# Patient Record
Sex: FEMALE | Race: WHITE | NOT HISPANIC OR LATINO | Employment: FULL TIME | URBAN - NONMETROPOLITAN AREA
[De-identification: names, ages, dates, MRNs, and addresses within clinical notes are randomized per-mention and may not be internally consistent; named-entity substitution may affect disease eponyms.]

---

## 2017-01-03 ENCOUNTER — DOCUMENTATION (OUTPATIENT)
Dept: GASTROENTEROLOGY | Facility: CLINIC | Age: 42
End: 2017-01-03

## 2017-01-03 NOTE — PROGRESS NOTES
I faxed approval for 1 refill of Miralax to patients pharmacy with request for patient to schedule an office visit for more refills. This is scanned in media under medications.

## 2017-01-09 ENCOUNTER — HOSPITAL ENCOUNTER (OUTPATIENT)
Dept: INFUSION THERAPY | Facility: HOSPITAL | Age: 42
Setting detail: INFUSION SERIES
Discharge: HOME | End: 2017-01-09
Attending: PSYCHIATRY & NEUROLOGY

## 2017-01-09 ENCOUNTER — TRANSCRIBE ORDERS (OUTPATIENT)
Dept: INFUSION THERAPY | Facility: HOSPITAL | Age: 42
End: 2017-01-09

## 2017-01-09 VITALS
BODY MASS INDEX: 22.66 KG/M2 | DIASTOLIC BLOOD PRESSURE: 81 MMHG | HEART RATE: 77 BPM | WEIGHT: 153 LBS | SYSTOLIC BLOOD PRESSURE: 121 MMHG | TEMPERATURE: 97.6 F | HEIGHT: 69 IN | RESPIRATION RATE: 16 BRPM

## 2017-01-09 DIAGNOSIS — G43.709 MIGRAINE, TRANSFORMED: Primary | ICD-10-CM

## 2017-01-09 DIAGNOSIS — G43.709 MIGRAINE, TRANSFORMED: ICD-10-CM

## 2017-01-09 PROCEDURE — 25010000002 HYDROMORPHONE PER 4 MG: Performed by: PSYCHIATRY & NEUROLOGY

## 2017-01-09 PROCEDURE — 25010000002 PROMETHAZINE PER 50 MG: Performed by: PSYCHIATRY & NEUROLOGY

## 2017-01-09 PROCEDURE — 25010000002 BUTORPHANOL PER 1 MG: Performed by: PSYCHIATRY & NEUROLOGY

## 2017-01-09 PROCEDURE — 96372 THER/PROPH/DIAG INJ SC/IM: CPT

## 2017-01-09 RX ORDER — PROMETHAZINE HYDROCHLORIDE 25 MG/ML
25 INJECTION, SOLUTION INTRAMUSCULAR; INTRAVENOUS ONCE
Status: COMPLETED | OUTPATIENT
Start: 2017-01-09 | End: 2017-01-09

## 2017-01-09 RX ADMIN — HYDROMORPHONE HYDROCHLORIDE 1 MG: 1 INJECTION, SOLUTION INTRAMUSCULAR; INTRAVENOUS; SUBCUTANEOUS at 14:20

## 2017-01-09 RX ADMIN — BUTORPHANOL TARTRATE 2 MG: 2 INJECTION, SOLUTION INTRAMUSCULAR; INTRAVENOUS at 14:44

## 2017-01-09 RX ADMIN — PROMETHAZINE HYDROCHLORIDE 25 MG: 25 INJECTION INTRAMUSCULAR; INTRAVENOUS at 14:19

## 2017-01-09 NOTE — PATIENT INSTRUCTIONS
Driving and Equipment Restrictions  Some medical problems make it dangerous to drive, ride a bike, or use machines. Some of these problems are:  · A hard blow to the head (concussion).  · Passing out (fainting).  · Twitching and shaking (seizures).  · Low blood sugar.  · Taking medicine to help you relax (sedatives).  · Taking pain medicines.  · Wearing an eye patch.  · Wearing splints. This can make it hard to use parts of your body that you need to drive safely.  HOME CARE   · Do not drive until your doctor says it is okay.  · Do not use machines until your doctor says it is okay.  You may need a form signed by your doctor (medical release) before you can drive again. You may also need this form before you do other tasks where you need to be fully alert.  MAKE SURE YOU:  · Understand these instructions.  · Will watch your condition.  · Will get help right away if you are not doing well or get worse.     This information is not intended to replace advice given to you by your health care provider. Make sure you discuss any questions you have with your health care provider.     Document Released: 01/25/2006 Document Revised: 03/11/2013 Document Reviewed: 04/27/2011  [employer] ©2016 [employer]  Hydromorphone injection  What is this medicine?  HYDROMORPHONE (tomas droe MOR fone) is a pain reliever. It is used to treat moderate to severe pain.  This medicine may be used for other purposes; ask your health care provider or pharmacist if you have questions.  What should I tell my health care provider before I take this medicine?  They need to know if you have any of these conditions:  -brain tumor  -drug abuse or addiction  -head injury  -heart disease  -frequently drink alcohol containing drinks  -kidney disease  -liver disease  -lung disease, asthma, or breathing problems  -an allergic or unusual reaction to hydromorphone, other opioid analgesics, latex, other medicines, foods, dyes, or  preservatives  -pregnant or trying to get pregnant  -breast-feeding  How should I use this medicine?  This medicine is for injection into a vein, into a muscle, or under the skin. It is usually given by a health care professional in a hospital or clinic setting.  If you get this medicine at home, you will be taught how to prepare and give this medicine. Use exactly as directed. Take your medicine at regular intervals. Do not take your medicine more often than directed.  It is important that you put your used needles and syringes in a special sharps container. Do not put them in a trash can. If you do not have a sharps container, call your pharmacist or healthcare provider to get one.  Talk to your pediatrician regarding the use of this medicine in children. This medicine is not approved for use in children.  Overdosage: If you think you have taken too much of this medicine contact a poison control center or emergency room at once.  NOTE: This medicine is only for you. Do not share this medicine with others.  What if I miss a dose?  If you miss a dose, use it as soon as you can. If it is almost time for your next dose, use only that dose. Do not use double or extra doses.  What may interact with this medicine?  -alcohol  -antihistamines for allergy, cough and cold  -medicines for anesthesia  -medicines for depression, anxiety, or psychotic disturbances  -medicines for sleep  -muscle relaxants  -naltrexone  -narcotic medicines (opiates) for pain  -phenothiazines like chlorpromazine, mesoridazine, prochlorperazine, thioridazine  -tramadol  This list may not describe all possible interactions. Give your health care provider a list of all the medicines, herbs, non-prescription drugs, or dietary supplements you use. Also tell them if you smoke, drink alcohol, or use illegal drugs. Some items may interact with your medicine.  What should I watch for while using this medicine?  Tell your doctor or health care professional  if your pain does not go away, if it gets worse, or if you have new or a different type of pain. You may develop tolerance to the medicine. Tolerance means that you will need a higher dose of the medicine for pain relief. Tolerance is normal and is expected if you take this medicine for a long time.  Do not suddenly stop taking your medicine because you may develop a severe reaction. Your body becomes used to the medicine. This does NOT mean you are addicted. Addiction is a behavior related to getting and using a drug for a non-medical reason. If you have pain, you have a medical reason to take pain medicine. Your doctor will tell you how much medicine to take. If your doctor wants you to stop the medicine, the dose will be slowly lowered over time to avoid any side effects.  You may get drowsy or dizzy. Do not drive, use machinery, or do anything that needs mental alertness until you know how this medicine affects you. Do not stand or sit up quickly, especially if you are an older patient. This reduces the risk of dizzy or fainting spells. Alcohol may interfere with the effect of this medicine. Avoid alcoholic drinks.  There are different types of narcotic medicines (opiates) for pain. If you take more than one type at the same time, you may have more side effects. Give your health care provider a list of all medicines you use. Your doctor will tell you how much medicine to take. Do not take more medicine than directed. Call emergency for help if you have problems breathing.  This medicine will cause constipation. Try to have a bowel movement at least every 2 to 3 days. If you do not have a bowel movement for 3 days, call your doctor or health care professional.  Your mouth may get dry. Chewing sugarless gum or sucking hard candy, and drinking plenty of water may help. Contact your doctor if the problem does not go away or is severe.  What side effects may I notice from receiving this medicine?  Side effects that  you should report to your doctor or health care professional as soon as possible:  -allergic reactions like skin rash, itching or hives, swelling of the face, lips, or tongue  -breathing problems  -changes in vision  -confusion  -feeling faint or lightheaded, falls  -seizures  -slow or fast heartbeat  -trouble passing urine or change in the amount of urine  -trouble with balance, talking, walking  -unusually weak or tired  Side effects that usually do not require medical attention (report to your doctor or health care professional if they continue or are bothersome):  -difficulty sleeping  -drowsiness  -dry mouth  -flushing  -headache  -itching  -loss of appetite  -nausea, vomiting  This list may not describe all possible side effects. Call your doctor for medical advice about side effects. You may report side effects to FDA at 3-329-FDA-1822.  Where should I keep my medicine?  Keep out of the reach of children. This medicine can be abused. Keep your medicine in a safe place to protect it from theft. Do not share this medicine with anyone. Selling or giving away this medicine is dangerous and against the law.  If you are using this medicine at home, you will be instructed on how to store this medicine.  This medicine may cause accidental overdose and death if it is taken by other adults, children, or pets. Flush any unused medicine down the toilet to reduce the chance of harm. Do not use the medicine after the expiration date.  NOTE: This sheet is a summary. It may not cover all possible information. If you have questions about this medicine, talk to your doctor, pharmacist, or health care provider.     © 2016, [employer]. (2014-07-22 10:47:33)  Promethazine injection  What is this medicine?  PROMETHAZINE (proe METH a zeen) is an antihistamine. It is used to treat allergic reactions and to treat or prevent nausea and vomiting from illness or motion sickness. It is also used to make you sleep before  surgery, and to help treat pain or nausea after surgery.  This medicine may be used for other purposes; ask your health care provider or pharmacist if you have questions.  What should I tell my health care provider before I take this medicine?  They need to know if you have any of these conditions:  -glaucoma  -high blood pressure or heart disease  -kidney disease  -liver disease  -lung or breathing disease, like asthma  -prostate trouble  -pain or difficulty passing urine  -seizures  -an unusual or allergic reaction to promethazine or phenothiazines, other medicines, foods, dyes, or preservatives  -pregnant or trying to get pregnant  -breast-feeding  How should I use this medicine?  This medicine is for injection into a muscle, or into a vein. It is given by a health care professional in a hospital or clinic setting.  Talk to your pediatrician regarding the use of this medicine in children. This medicine should not be given to infants and children younger than 2 years old.  Overdosage: If you think you have taken too much of this medicine contact a poison control center or emergency room at once.  NOTE: This medicine is only for you. Do not share this medicine with others.  What if I miss a dose?  This does not apply.  What may interact with this medicine?  Do not take this medicine with any of the following medications:  -cisapride  -dofetilide  -dronedarone  -MAOIs like Carbex, Eldepryl, Marplan, Nardil, Parnate  -pimozide  -quinidine, including dextromethorphan; quinidine  -thioridazine  -ziprasidone  This medicine may also interact with the following medications:  -certain medicines for depression, anxiety, or psychotic disturbances  -certain medicines for anxiety or sleep  -certain medicines for seizures like carbamazepine, phenobarbital, phenytoin  -certain medicines for movement abnormalities as in Parkinson's disease, or for gastrointestinal problems  -epinephrine  -medicines for allergies or colds  -muscle  relaxants  -narcotic medicines for pain  -other medicines that prolong the QT interval (cause an abnormal heart rhythm)  -tramadol  -trimethobenzamide  This list may not describe all possible interactions. Give your health care provider a list of all the medicines, herbs, non-prescription drugs, or dietary supplements you use. Also tell them if you smoke, drink alcohol, or use illegal drugs. Some items may interact with your medicine.  What should I watch for while using this medicine?  Your condition will be monitored carefully while you are receiving this medicine.  Your healthcare professional will discuss with you the risks and the benefits of using this medicine. This medicine has caused serious side effects in some patients after it was injected into a vein. Watch closely for any signs or symptoms of a local reaction like burning, pain, redness, swelling, and blistering and tell your healthcare professional immediately if any occur. These symptoms may occur when you receive the injection or may occur hours or even days after the injection.  You may get drowsy or dizzy. Do not drive, use machinery, or do anything that needs mental alertness until you know how this medicine affects you. To reduce the risk of dizzy or fainting spells, do not stand or sit up quickly, especially if you are an older patient. Alcohol may increase dizziness and drowsiness. Avoid alcoholic drinks.  Your mouth may get dry. Chewing sugarless gum or sucking hard candy, and drinking plenty of water will help.  This medicine may cause dry eyes and blurred vision. If you wear contact lenses you may feel some discomfort. Lubricating drops may help. See your eye doctor if the problem does not go away or is severe.  Keep out of the sun, or wear protective clothing outdoors and use a sunscreen. Do not use sun lamps or sun tanning beds or booths.  If you are diabetic, check your blood-sugar levels regularly.  What side effects may I notice from  receiving this medicine?  Side effects that you should report to your doctor or health care professional as soon as possible:  -allergic reactions like skin rash, itching or hives, swelling of the face, lips, or tongue  -blurred vision  -burning, blistering, pain, redness, and/or swelling at the injection site  -irregular heartbeat, palpitations or chest pain  -muscle or facial twitches  -pain or difficulty passing urine  -seizures  -slowed or shallow breathing  -unusual bleeding or bruising  -yellowing of the eyes or skin  Side effects that usually do not require medical attention (report to your doctor or health care professional if they continue or are bothersome):  -headache  -nightmares, agitation, nervousness, excitability, not able to sleep (these are more likely in children)  -stuffy nose  This list may not describe all possible side effects. Call your doctor for medical advice about side effects. You may report side effects to FDA at 5-934-FDA-0282.  Where should I keep my medicine?  This drug is given in a hospital or clinic and will not be stored at home.  NOTE: This sheet is a summary. It may not cover all possible information. If you have questions about this medicine, talk to your doctor, pharmacist, or health care provider.     © 2016, [employer]. (2014-08-20 16:03:14)  Butorphanol injection  What is this medicine?  BUTORPHANOL (byoo TOR fa nogisselle) is a pain reliever. It is used to treat moderate to severe pain. It is also used to prevent pain before surgery and during child birth.  This medicine may be used for other purposes; ask your health care provider or pharmacist if you have questions.  What should I tell my health care provider before I take this medicine?  They need to know if you have any of these conditions:  -brain tumor  -drug abuse or addiction  -head injury  -heart disease  -if you frequently drink alcohol containing drinks  -kidney disease  -liver disease  -lung or breathing  disease, like asthma  -an allergic reaction to butorphanol, benzethonium chloride, other medicines, foods, dyes, or preservatives  -pregnant or trying to get pregnant  -breast-feeding  How should I use this medicine?  This medicine is for injection into a muscle or a vein. It is usually given by a health care professional in a hospital or clinic setting.  If you get this medicine at home, you will be taught how to prepare and give this medicine. Use exactly as directed. Take your medicine at regular intervals. Do not take your medicine more often than directed.  It is important that you put your used needles and syringes in a special sharps container. Do not put them in a trash can. If you do not have a sharps container, call your pharmacist or healthcare provider to get one.  Talk to your pediatrician regarding the use of this medicine in children. This medicine is not approved for use in children.  Overdosage: If you think you have taken too much of this medicine contact a poison control center or emergency room at once.  NOTE: This medicine is only for you. Do not share this medicine with others.  What if I miss a dose?  If you miss a dose, take it as soon as you can. If it is almost time for your next dose, take only that dose. Do not take double or extra doses.  What may interact with this medicine?  Do not take this medicine with any of the following medications:  -buprenorphine  -nalbuphine  -pentazocine  This medicine may also interact with the following medications:  -alcohol  -antihistamines for allergy, cough and cold  -barbiturates, like phenobarbital  -medicines for depression, anxiety, or psychotic disturbances  -medicines for sleep  -muscle relaxants  -narcotic medicines (opiates) for pain  -tramadol  This list may not describe all possible interactions. Give your health care provider a list of all the medicines, herbs, non-prescription drugs, or dietary supplements you use. Also tell them if you  smoke, drink alcohol, or use illegal drugs. Some items may interact with your medicine.  What should I watch for while using this medicine?  Tell your doctor or health care professional if your pain does not go away, if it gets worse, or if you have new or a different type of pain. You may develop tolerance to the medicine. Tolerance means that you will need a higher dose of the medicine for pain relief. Tolerance is normal and is expected if you take the medicine for a long time.  Do not suddenly stop taking your medicine because you may develop a severe reaction. Your body becomes used to the medicine. This does NOT mean you are addicted. Addiction is a behavior related to getting and using a drug for a non-medical reason. If you have pain, you have a medical reason to take pain medicine. Your doctor will tell you how much medicine to take. If your doctor wants you to stop the medicine, the dose will be slowly lowered over time to avoid any side effects.  You may get drowsy or dizzy. Do not drive, use machinery, or do anything that needs mental alertness until you know how this medicine affects you. Do not stand or sit up quickly, especially if you are an older patient. This reduces the risk of dizzy or fainting spells. Alcohol may interfere with the effect of this medicine. Avoid alcoholic drinks.  There are different types of narcotic medicines (opiates) for pain. If you take more than one type at the same time, you may have more side effects. Give your health care provider a list of all medicines you use. Your doctor will tell you how much medicine to take. Do not take more medicine than directed. Call emergency for help if you have problems breathing.  This medicine will cause constipation. Try to have a bowel movement at least every 2 to 3 days. If you do not have a bowel movement for 3 days, call your doctor or health care professional.  This medicine may be used to treat migraines. If you take migraine  medicines for 10 or more days a month, your migraines may get worse. Keep a diary of headache days and medicine use. Contact your healthcare professional if your migraine attacks occur more frequently.  What side effects may I notice from receiving this medicine?  Side effects that you should report to your doctor or health care professional as soon as possible:  -allergic reactions like skin rash, itching or hives, swelling of the face, lips, or tongue  -anxiety, nervousness, agitation  -breathing problems  -changes in vision  -confusion  -feeling faint or lightheaded, falls  -irregular or pounding heartbeat  -ringing in the ears  Side effects that usually do not require medical attention (report to your doctor or health care professional if they continue or are bothersome):  -difficulty sleeping at night  -dizziness  -drowsiness  -dry mouth  -headache  -loss of appetite  -nausea, vomiting  -pain, redness, or irritation at site where injected  This list may not describe all possible side effects. Call your doctor for medical advice about side effects. You may report side effects to FDA at 4-367-FDA-9446.  Where should I keep my medicine?  Keep out of the reach of children. This medicine can be abused. Keep your medicine in a safe place to protect it from theft. Do not share this medicine with anyone. Selling or giving away this medicine is dangerous and against the law.  If you are using this medicine at home, you will be instructed on how to store this medicine. Throw away any unused medicine after the expiration date on the label. Discard unused medicine and used packaging carefully. Pets and children can be harmed if they find used or lost packages.  NOTE: This sheet is a summary. It may not cover all possible information. If you have questions about this medicine, talk to your doctor, pharmacist, or health care provider.     © 2016, [employer]. (2014-08-19 10:59:21)

## 2017-01-09 NOTE — MR AVS SNAPSHOT
[facility] OUTPATIENT INFUSION NON ONCOLOGY  [phone]                    Susanna Camilo   1/9/2017  3:00 PM   INFUSION    Provider:  [facility]   Department:  [facility] OUTPATIENT INFUSION NON ONCOLOGY   Dept Phone:  [phone]                Your Full Care Plan           Instructions    Driving and Equipment Restrictions  Some medical problems make it dangerous to drive, ride a bike, or use machines. Some of these problems are:  · A hard blow to the head (concussion).  · Passing out (fainting).  · Twitching and shaking (seizures).  · Low blood sugar.  · Taking medicine to help you relax (sedatives).  · Taking pain medicines.  · Wearing an eye patch.  · Wearing splints. This can make it hard to use parts of your body that you need to drive safely.  HOME CARE   · Do not drive until your doctor says it is okay.  · Do not use machines until your doctor says it is okay.  You may need a form signed by your doctor (medical release) before you can drive again. You may also need this form before you do other tasks where you need to be fully alert.  MAKE SURE YOU:  · Understand these instructions.  · Will watch your condition.  · Will get help right away if you are not doing well or get worse.     This information is not intended to replace advice given to you by your health care provider. Make sure you discuss any questions you have with your health care provider.     Document Released: 01/25/2006 Document Revised: 03/11/2013 Document Reviewed: 04/27/2011  [employer] Interactive Patient Education ©2016 [employer]  Hydromorphone injection  What is this medicine?  HYDROMORPHONE (tomas droe MOR fone) is a pain reliever. It is used to treat moderate to severe pain.  This medicine may be used for other purposes; ask your health care provider or pharmacist if you have questions.  What should I tell my health care provider before I take this medicine?  They need to know if you have any of these  conditions:  -brain tumor  -drug abuse or addiction  -head injury  -heart disease  -frequently drink alcohol containing drinks  -kidney disease  -liver disease  -lung disease, asthma, or breathing problems  -an allergic or unusual reaction to hydromorphone, other opioid analgesics, latex, other medicines, foods, dyes, or preservatives  -pregnant or trying to get pregnant  -breast-feeding  How should I use this medicine?  This medicine is for injection into a vein, into a muscle, or under the skin. It is usually given by a health care professional in a hospital or clinic setting.  If you get this medicine at home, you will be taught how to prepare and give this medicine. Use exactly as directed. Take your medicine at regular intervals. Do not take your medicine more often than directed.  It is important that you put your used needles and syringes in a special sharps container. Do not put them in a trash can. If you do not have a sharps container, call your pharmacist or healthcare provider to get one.  Talk to your pediatrician regarding the use of this medicine in children. This medicine is not approved for use in children.  Overdosage: If you think you have taken too much of this medicine contact a poison control center or emergency room at once.  NOTE: This medicine is only for you. Do not share this medicine with others.  What if I miss a dose?  If you miss a dose, use it as soon as you can. If it is almost time for your next dose, use only that dose. Do not use double or extra doses.  What may interact with this medicine?  -alcohol  -antihistamines for allergy, cough and cold  -medicines for anesthesia  -medicines for depression, anxiety, or psychotic disturbances  -medicines for sleep  -muscle relaxants  -naltrexone  -narcotic medicines (opiates) for pain  -phenothiazines like chlorpromazine, mesoridazine, prochlorperazine, thioridazine  -tramadol  This list may not describe all possible interactions. Give your  health care provider a list of all the medicines, herbs, non-prescription drugs, or dietary supplements you use. Also tell them if you smoke, drink alcohol, or use illegal drugs. Some items may interact with your medicine.  What should I watch for while using this medicine?  Tell your doctor or health care professional if your pain does not go away, if it gets worse, or if you have new or a different type of pain. You may develop tolerance to the medicine. Tolerance means that you will need a higher dose of the medicine for pain relief. Tolerance is normal and is expected if you take this medicine for a long time.  Do not suddenly stop taking your medicine because you may develop a severe reaction. Your body becomes used to the medicine. This does NOT mean you are addicted. Addiction is a behavior related to getting and using a drug for a non-medical reason. If you have pain, you have a medical reason to take pain medicine. Your doctor will tell you how much medicine to take. If your doctor wants you to stop the medicine, the dose will be slowly lowered over time to avoid any side effects.  You may get drowsy or dizzy. Do not drive, use machinery, or do anything that needs mental alertness until you know how this medicine affects you. Do not stand or sit up quickly, especially if you are an older patient. This reduces the risk of dizzy or fainting spells. Alcohol may interfere with the effect of this medicine. Avoid alcoholic drinks.  There are different types of narcotic medicines (opiates) for pain. If you take more than one type at the same time, you may have more side effects. Give your health care provider a list of all medicines you use. Your doctor will tell you how much medicine to take. Do not take more medicine than directed. Call emergency for help if you have problems breathing.  This medicine will cause constipation. Try to have a bowel movement at least every 2 to 3 days. If you do not have a bowel  movement for 3 days, call your doctor or health care professional.  Your mouth may get dry. Chewing sugarless gum or sucking hard candy, and drinking plenty of water may help. Contact your doctor if the problem does not go away or is severe.  What side effects may I notice from receiving this medicine?  Side effects that you should report to your doctor or health care professional as soon as possible:  -allergic reactions like skin rash, itching or hives, swelling of the face, lips, or tongue  -breathing problems  -changes in vision  -confusion  -feeling faint or lightheaded, falls  -seizures  -slow or fast heartbeat  -trouble passing urine or change in the amount of urine  -trouble with balance, talking, walking  -unusually weak or tired  Side effects that usually do not require medical attention (report to your doctor or health care professional if they continue or are bothersome):  -difficulty sleeping  -drowsiness  -dry mouth  -flushing  -headache  -itching  -loss of appetite  -nausea, vomiting  This list may not describe all possible side effects. Call your doctor for medical advice about side effects. You may report side effects to FDA at 6-455-FDA-1752.  Where should I keep my medicine?  Keep out of the reach of children. This medicine can be abused. Keep your medicine in a safe place to protect it from theft. Do not share this medicine with anyone. Selling or giving away this medicine is dangerous and against the law.  If you are using this medicine at home, you will be instructed on how to store this medicine.  This medicine may cause accidental overdose and death if it is taken by other adults, children, or pets. Flush any unused medicine down the toilet to reduce the chance of harm. Do not use the medicine after the expiration date.  NOTE: This sheet is a summary. It may not cover all possible information. If you have questions about this medicine, talk to your doctor, pharmacist, or health care  provider.     © 2016, [employer]. (2014-07-22 10:47:33)  Promethazine injection  What is this medicine?  PROMETHAZINE (proe METH a zeen) is an antihistamine. It is used to treat allergic reactions and to treat or prevent nausea and vomiting from illness or motion sickness. It is also used to make you sleep before surgery, and to help treat pain or nausea after surgery.  This medicine may be used for other purposes; ask your health care provider or pharmacist if you have questions.  What should I tell my health care provider before I take this medicine?  They need to know if you have any of these conditions:  -glaucoma  -high blood pressure or heart disease  -kidney disease  -liver disease  -lung or breathing disease, like asthma  -prostate trouble  -pain or difficulty passing urine  -seizures  -an unusual or allergic reaction to promethazine or phenothiazines, other medicines, foods, dyes, or preservatives  -pregnant or trying to get pregnant  -breast-feeding  How should I use this medicine?  This medicine is for injection into a muscle, or into a vein. It is given by a health care professional in a hospital or clinic setting.  Talk to your pediatrician regarding the use of this medicine in children. This medicine should not be given to infants and children younger than 2 years old.  Overdosage: If you think you have taken too much of this medicine contact a poison control center or emergency room at once.  NOTE: This medicine is only for you. Do not share this medicine with others.  What if I miss a dose?  This does not apply.  What may interact with this medicine?  Do not take this medicine with any of the following medications:  -cisapride  -dofetilide  -dronedarone  -MAOIs like Carbex, Eldepryl, Marplan, Nardil, Parnate  -pimozide  -quinidine, including dextromethorphan; quinidine  -thioridazine  -ziprasidone  This medicine may also interact with the following medications:  -certain medicines for  depression, anxiety, or psychotic disturbances  -certain medicines for anxiety or sleep  -certain medicines for seizures like carbamazepine, phenobarbital, phenytoin  -certain medicines for movement abnormalities as in Parkinson's disease, or for gastrointestinal problems  -epinephrine  -medicines for allergies or colds  -muscle relaxants  -narcotic medicines for pain  -other medicines that prolong the QT interval (cause an abnormal heart rhythm)  -tramadol  -trimethobenzamide  This list may not describe all possible interactions. Give your health care provider a list of all the medicines, herbs, non-prescription drugs, or dietary supplements you use. Also tell them if you smoke, drink alcohol, or use illegal drugs. Some items may interact with your medicine.  What should I watch for while using this medicine?  Your condition will be monitored carefully while you are receiving this medicine.  Your healthcare professional will discuss with you the risks and the benefits of using this medicine. This medicine has caused serious side effects in some patients after it was injected into a vein. Watch closely for any signs or symptoms of a local reaction like burning, pain, redness, swelling, and blistering and tell your healthcare professional immediately if any occur. These symptoms may occur when you receive the injection or may occur hours or even days after the injection.  You may get drowsy or dizzy. Do not drive, use machinery, or do anything that needs mental alertness until you know how this medicine affects you. To reduce the risk of dizzy or fainting spells, do not stand or sit up quickly, especially if you are an older patient. Alcohol may increase dizziness and drowsiness. Avoid alcoholic drinks.  Your mouth may get dry. Chewing sugarless gum or sucking hard candy, and drinking plenty of water will help.  This medicine may cause dry eyes and blurred vision. If you wear contact lenses you may feel some  discomfort. Lubricating drops may help. See your eye doctor if the problem does not go away or is severe.  Keep out of the sun, or wear protective clothing outdoors and use a sunscreen. Do not use sun lamps or sun tanning beds or booths.  If you are diabetic, check your blood-sugar levels regularly.  What side effects may I notice from receiving this medicine?  Side effects that you should report to your doctor or health care professional as soon as possible:  -allergic reactions like skin rash, itching or hives, swelling of the face, lips, or tongue  -blurred vision  -burning, blistering, pain, redness, and/or swelling at the injection site  -irregular heartbeat, palpitations or chest pain  -muscle or facial twitches  -pain or difficulty passing urine  -seizures  -slowed or shallow breathing  -unusual bleeding or bruising  -yellowing of the eyes or skin  Side effects that usually do not require medical attention (report to your doctor or health care professional if they continue or are bothersome):  -headache  -nightmares, agitation, nervousness, excitability, not able to sleep (these are more likely in children)  -stuffy nose  This list may not describe all possible side effects. Call your doctor for medical advice about side effects. You may report side effects to FDA at 7-774-FDA-7542.  Where should I keep my medicine?  This drug is given in a hospital or clinic and will not be stored at home.  NOTE: This sheet is a summary. It may not cover all possible information. If you have questions about this medicine, talk to your doctor, pharmacist, or health care provider.     © 2016, [employer] Standard. (2014-08-20 16:03:14)  Butorphanol injection  What is this medicine?  BUTORPHANOL (byoo TOR fa nole) is a pain reliever. It is used to treat moderate to severe pain. It is also used to prevent pain before surgery and during child birth.  This medicine may be used for other purposes; ask your health care provider or  pharmacist if you have questions.  What should I tell my health care provider before I take this medicine?  They need to know if you have any of these conditions:  -brain tumor  -drug abuse or addiction  -head injury  -heart disease  -if you frequently drink alcohol containing drinks  -kidney disease  -liver disease  -lung or breathing disease, like asthma  -an allergic reaction to butorphanol, benzethonium chloride, other medicines, foods, dyes, or preservatives  -pregnant or trying to get pregnant  -breast-feeding  How should I use this medicine?  This medicine is for injection into a muscle or a vein. It is usually given by a health care professional in a hospital or clinic setting.  If you get this medicine at home, you will be taught how to prepare and give this medicine. Use exactly as directed. Take your medicine at regular intervals. Do not take your medicine more often than directed.  It is important that you put your used needles and syringes in a special sharps container. Do not put them in a trash can. If you do not have a sharps container, call your pharmacist or healthcare provider to get one.  Talk to your pediatrician regarding the use of this medicine in children. This medicine is not approved for use in children.  Overdosage: If you think you have taken too much of this medicine contact a poison control center or emergency room at once.  NOTE: This medicine is only for you. Do not share this medicine with others.  What if I miss a dose?  If you miss a dose, take it as soon as you can. If it is almost time for your next dose, take only that dose. Do not take double or extra doses.  What may interact with this medicine?  Do not take this medicine with any of the following medications:  -buprenorphine  -nalbuphine  -pentazocine  This medicine may also interact with the following medications:  -alcohol  -antihistamines for allergy, cough and cold  -barbiturates, like phenobarbital  -medicines for  depression, anxiety, or psychotic disturbances  -medicines for sleep  -muscle relaxants  -narcotic medicines (opiates) for pain  -tramadol  This list may not describe all possible interactions. Give your health care provider a list of all the medicines, herbs, non-prescription drugs, or dietary supplements you use. Also tell them if you smoke, drink alcohol, or use illegal drugs. Some items may interact with your medicine.  What should I watch for while using this medicine?  Tell your doctor or health care professional if your pain does not go away, if it gets worse, or if you have new or a different type of pain. You may develop tolerance to the medicine. Tolerance means that you will need a higher dose of the medicine for pain relief. Tolerance is normal and is expected if you take the medicine for a long time.  Do not suddenly stop taking your medicine because you may develop a severe reaction. Your body becomes used to the medicine. This does NOT mean you are addicted. Addiction is a behavior related to getting and using a drug for a non-medical reason. If you have pain, you have a medical reason to take pain medicine. Your doctor will tell you how much medicine to take. If your doctor wants you to stop the medicine, the dose will be slowly lowered over time to avoid any side effects.  You may get drowsy or dizzy. Do not drive, use machinery, or do anything that needs mental alertness until you know how this medicine affects you. Do not stand or sit up quickly, especially if you are an older patient. This reduces the risk of dizzy or fainting spells. Alcohol may interfere with the effect of this medicine. Avoid alcoholic drinks.  There are different types of narcotic medicines (opiates) for pain. If you take more than one type at the same time, you may have more side effects. Give your health care provider a list of all medicines you use. Your doctor will tell you how much medicine to take. Do not take more  medicine than directed. Call emergency for help if you have problems breathing.  This medicine will cause constipation. Try to have a bowel movement at least every 2 to 3 days. If you do not have a bowel movement for 3 days, call your doctor or health care professional.  This medicine may be used to treat migraines. If you take migraine medicines for 10 or more days a month, your migraines may get worse. Keep a diary of headache days and medicine use. Contact your healthcare professional if your migraine attacks occur more frequently.  What side effects may I notice from receiving this medicine?  Side effects that you should report to your doctor or health care professional as soon as possible:  -allergic reactions like skin rash, itching or hives, swelling of the face, lips, or tongue  -anxiety, nervousness, agitation  -breathing problems  -changes in vision  -confusion  -feeling faint or lightheaded, falls  -irregular or pounding heartbeat  -ringing in the ears  Side effects that usually do not require medical attention (report to your doctor or health care professional if they continue or are bothersome):  -difficulty sleeping at night  -dizziness  -drowsiness  -dry mouth  -headache  -loss of appetite  -nausea, vomiting  -pain, redness, or irritation at site where injected  This list may not describe all possible side effects. Call your doctor for medical advice about side effects. You may report side effects to FDA at 2-907-FDA-4810.  Where should I keep my medicine?  Keep out of the reach of children. This medicine can be abused. Keep your medicine in a safe place to protect it from theft. Do not share this medicine with anyone. Selling or giving away this medicine is dangerous and against the law.  If you are using this medicine at home, you will be instructed on how to store this medicine. Throw away any unused medicine after the expiration date on the label. Discard unused medicine and used packaging  carefully. Pets and children can be harmed if they find used or lost packages.  NOTE: This sheet is a summary. It may not cover all possible information. If you have questions about this medicine, talk to your doctor, pharmacist, or health care provider.     © 2016, [employer]vier/Gold Standard. (2014-08-19 10:59:21)         To Do List     1/13/2017 3:00 PM     Appointment with MIGDALIA Balderas at [facility] ([phone])   Bring all previous medical records and films, along with current medications and insurance information.   [address] [place] [address]            Your Updated Medication List      ASK your doctor about these medications     azelastine 0.15 % solution nasal spray   Commonly known as:  ASTEPRO   2 sprays into each nostril 2 (Two) Times a Day.       azithromycin 250 MG tablet   Commonly known as:  ZITHROMAX   Take 2 tablets the first day, then 1 tablet daily for 4 days.       busPIRone 15 MG tablet   Commonly known as:  BUSPAR       butalbital-acetaminophen-caffeine -40 MG per tablet   Commonly known as:  FIORICET   Take 1-2 tablets by mouth Every 6 (Six) Hours As Needed for headaches or migraine.       cetirizine 10 MG tablet   Commonly known as:  zyrTEC   Take 1 tablet by mouth Daily for 30 days.       cyclobenzaprine 10 MG tablet   Commonly known as:  FLEXERIL       divalproex 500 MG DR tablet   Commonly known as:  DEPAKOTE       famotidine 20 MG tablet   Commonly known as:  PEPCID       fluticasone 50 MCG/ACT nasal spray   Commonly known as:  FLONASE   1 spray into each nostril 2 (Two) Times a Day for 30 days.       MethylPREDNISolone 4 MG tablet   Commonly known as:  MEDROL (BRET)   Take as directed on package instructions.       montelukast 10 MG tablet   Commonly known as:  SINGULAIR   Take 1 tablet by mouth Every Night for 30 days.       ondansetron 4 MG tablet   Commonly known as:  ZOFRAN   Take 1 tablet by mouth Every 6 (Six) Hours. PRN  Nausea/vomiting       * ondansetron ODT 4 MG disintegrating tablet   Commonly known as:  ZOFRAN-ODT   Take 1 tablet by mouth 4 (Four) Times a Day As Needed for nausea or vomiting.       * ondansetron ODT 4 MG disintegrating tablet   Commonly known as:  ZOFRAN ODT   Take 1 tablet by mouth Every 8 (Eight) Hours As Needed for nausea.       oxyCODONE-acetaminophen 5-325 MG per tablet   Commonly known as:  PERCOCET   Take 1 tablet by mouth Every 6 (Six) Hours As Needed for moderate pain (4-6).       pantoprazole 40 MG EC tablet   Commonly known as:  PROTONIX       * promethazine 12.5 MG tablet   Commonly known as:  PHENERGAN   Take 1 tablet by mouth Every 6 (Six) Hours As Needed for nausea or vomiting.       * promethazine 12.5 MG tablet   Commonly known as:  PHENERGAN   Take 1 tablet by mouth Every 6 (Six) Hours As Needed for nausea or vomiting.       * promethazine 25 MG tablet   Commonly known as:  PHENERGAN       sertraline 100 MG tablet   Commonly known as:  ZOLOFT       SUMAtriptan 6 MG/0.5ML solution injection   Commonly known as:  IMITREX       topiramate 50 MG tablet   Commonly known as:  TOPAMAX       traMADol 50 MG tablet   Commonly known as:  ULTRAM   Take 1 tablet by mouth Every 6 (Six) Hours As Needed for moderate pain (4-6).       * Notice:  This list has 5 medication(s) that are the same as other medications prescribed for you. Read the directions carefully, and ask your doctor or other care provider to review them with you.            [employer] Signup     [facility] [employer] allows you to send messages to your doctor, view your test results, renew your prescriptions, schedule appointments, and more. To sign up, go to [employer] and click on the Sign Up Now link in the New User? box. Enter your [employer] Activation Code exactly as it appears below along with the last four digits of your Social Security Number and your Date of Birth () to complete the sign-up process. If you do not sign up  "before the expiration date, you must request a new code.    [employer] Activation Code: [email or web address]  Expires: 1/20/2017  5:40 AM    If you have questions, you can email [email or web address] or call [phone] to talk to our MyChart staff. Remember, [employer] is NOT to be used for urgent needs. For medical emergencies, dial 911.               Other Info from Your Visit           Allergies     Ativan [Lorazepam] Hallucinations    confusion    Sulfa Antibiotics Shortness Of Breath, Swelling    Compazine [Prochlorperazine Edisylate] Hives    Demerol [Meperidine] Hives    Droperidol     Toradol [Ketorolac Tromethamine] Hives, Itching      Reason for Visit     Injections           Vital Signs     Blood Pressure Pulse Temperature Respirations Height Weight    121/81 77 97.6 °F (36.4 °C) (Oral) 16 69\" (175.3 cm) 153 lb (69.4 kg)    Last Menstrual Period Body Mass Index Smoking Status             (LMP Unknown) 22.59 kg/m2 Former Smoker         Medications Administered     butorphanol (STADOL) injection 2 mg                  HYDROmorphone (DILAUDID) injection 1 mg                  promethazine (PHENERGAN) injection 25 mg                    Discharge Instructions     None         SYMPTOMS OF A STROKE    Call 911 or have someone take you to the Emergency Department if you have any of the following:    · Sudden numbness or weakness of your face, arm or leg especially on one side of the body  · Sudden confusion, diffiiculty speaking or trouble understanding   · Changes in your vision or loss of sight in one eye  · Sudden severe headache with no known cause  · sudden dizziness, trouble walking, loss of balance or coordination    It is important to seek emergency care right away if you have further stroke symptoms. If you get emergency help quickly, the powerful clot-dissolving medicines can reduce the disabilities caused by a stroke.     For more information:    American Stroke " Association  5-297-1-STROKE  www.strokeassociation.org           IF YOU SMOKE OR USE TOBACCO PLEASE READ THE FOLLOWING:    Why is smoking bad for me?  Smoking increases the risk of heart disease, lung disease, vascular disease, stroke, and cancer.     If you smoke, STOP!    If you would like more information on quitting smoking, please visit the [employer] website: [email or web address]   This link will provide additional resources including the QUIT line and the [employer] support groups.     For more information:    American Cancer Society  ([phone]    American Heart Association  1-255.846.8234

## 2017-01-09 NOTE — IP AVS SNAPSHOT
[facility] OUTPATIENT INFUSION NON ONCOLOGY  [phone]                    Susanna Camilo   1/9/2017  3:00 PM   INFUSION    Provider:  BED [facility]U   Department:  [facility] OUTPATIENT INFUSION NON ONCOLOGY   Dept Phone:  [phone]                Your Full Care Plan           Instructions    Driving and Equipment Restrictions  Some medical problems make it dangerous to drive, ride a bike, or use machines. Some of these problems are:  · A hard blow to the head (concussion).  · Passing out (fainting).  · Twitching and shaking (seizures).  · Low blood sugar.  · Taking medicine to help you relax (sedatives).  · Taking pain medicines.  · Wearing an eye patch.  · Wearing splints. This can make it hard to use parts of your body that you need to drive safely.  HOME CARE   · Do not drive until your doctor says it is okay.  · Do not use machines until your doctor says it is okay.  You may need a form signed by your doctor (medical release) before you can drive again. You may also need this form before you do other tasks where you need to be fully alert.  MAKE SURE YOU:  · Understand these instructions.  · Will watch your condition.  · Will get help right away if you are not doing well or get worse.     This information is not intended to replace advice given to you by your health care provider. Make sure you discuss any questions you have with your health care provider.     Document Released: 01/25/2006 Document Revised: 03/11/2013 Document Reviewed: 04/27/2011  [employer] Interactive Patient Education ©2016 [employer]  Hydromorphone injection  What is this medicine?  HYDROMORPHONE (tomas droe MOR fone) is a pain reliever. It is used to treat moderate to severe pain.  This medicine may be used for other purposes; ask your health care provider or pharmacist if you have questions.  What should I tell my health care provider before I take this medicine?  They need to know if you have any of these  conditions:  -brain tumor  -drug abuse or addiction  -head injury  -heart disease  -frequently drink alcohol containing drinks  -kidney disease  -liver disease  -lung disease, asthma, or breathing problems  -an allergic or unusual reaction to hydromorphone, other opioid analgesics, latex, other medicines, foods, dyes, or preservatives  -pregnant or trying to get pregnant  -breast-feeding  How should I use this medicine?  This medicine is for injection into a vein, into a muscle, or under the skin. It is usually given by a health care professional in a hospital or clinic setting.  If you get this medicine at home, you will be taught how to prepare and give this medicine. Use exactly as directed. Take your medicine at regular intervals. Do not take your medicine more often than directed.  It is important that you put your used needles and syringes in a special sharps container. Do not put them in a trash can. If you do not have a sharps container, call your pharmacist or healthcare provider to get one.  Talk to your pediatrician regarding the use of this medicine in children. This medicine is not approved for use in children.  Overdosage: If you think you have taken too much of this medicine contact a poison control center or emergency room at once.  NOTE: This medicine is only for you. Do not share this medicine with others.  What if I miss a dose?  If you miss a dose, use it as soon as you can. If it is almost time for your next dose, use only that dose. Do not use double or extra doses.  What may interact with this medicine?  -alcohol  -antihistamines for allergy, cough and cold  -medicines for anesthesia  -medicines for depression, anxiety, or psychotic disturbances  -medicines for sleep  -muscle relaxants  -naltrexone  -narcotic medicines (opiates) for pain  -phenothiazines like chlorpromazine, mesoridazine, prochlorperazine, thioridazine  -tramadol  This list may not describe all possible interactions. Give your  health care provider a list of all the medicines, herbs, non-prescription drugs, or dietary supplements you use. Also tell them if you smoke, drink alcohol, or use illegal drugs. Some items may interact with your medicine.  What should I watch for while using this medicine?  Tell your doctor or health care professional if your pain does not go away, if it gets worse, or if you have new or a different type of pain. You may develop tolerance to the medicine. Tolerance means that you will need a higher dose of the medicine for pain relief. Tolerance is normal and is expected if you take this medicine for a long time.  Do not suddenly stop taking your medicine because you may develop a severe reaction. Your body becomes used to the medicine. This does NOT mean you are addicted. Addiction is a behavior related to getting and using a drug for a non-medical reason. If you have pain, you have a medical reason to take pain medicine. Your doctor will tell you how much medicine to take. If your doctor wants you to stop the medicine, the dose will be slowly lowered over time to avoid any side effects.  You may get drowsy or dizzy. Do not drive, use machinery, or do anything that needs mental alertness until you know how this medicine affects you. Do not stand or sit up quickly, especially if you are an older patient. This reduces the risk of dizzy or fainting spells. Alcohol may interfere with the effect of this medicine. Avoid alcoholic drinks.  There are different types of narcotic medicines (opiates) for pain. If you take more than one type at the same time, you may have more side effects. Give your health care provider a list of all medicines you use. Your doctor will tell you how much medicine to take. Do not take more medicine than directed. Call emergency for help if you have problems breathing.  This medicine will cause constipation. Try to have a bowel movement at least every 2 to 3 days. If you do not have a bowel  movement for 3 days, call your doctor or health care professional.  Your mouth may get dry. Chewing sugarless gum or sucking hard candy, and drinking plenty of water may help. Contact your doctor if the problem does not go away or is severe.  What side effects may I notice from receiving this medicine?  Side effects that you should report to your doctor or health care professional as soon as possible:  -allergic reactions like skin rash, itching or hives, swelling of the face, lips, or tongue  -breathing problems  -changes in vision  -confusion  -feeling faint or lightheaded, falls  -seizures  -slow or fast heartbeat  -trouble passing urine or change in the amount of urine  -trouble with balance, talking, walking  -unusually weak or tired  Side effects that usually do not require medical attention (report to your doctor or health care professional if they continue or are bothersome):  -difficulty sleeping  -drowsiness  -dry mouth  -flushing  -headache  -itching  -loss of appetite  -nausea, vomiting  This list may not describe all possible side effects. Call your doctor for medical advice about side effects. You may report side effects to FDA at 0-132-FDA-4231.  Where should I keep my medicine?  Keep out of the reach of children. This medicine can be abused. Keep your medicine in a safe place to protect it from theft. Do not share this medicine with anyone. Selling or giving away this medicine is dangerous and against the law.  If you are using this medicine at home, you will be instructed on how to store this medicine.  This medicine may cause accidental overdose and death if it is taken by other adults, children, or pets. Flush any unused medicine down the toilet to reduce the chance of harm. Do not use the medicine after the expiration date.  NOTE: This sheet is a summary. It may not cover all possible information. If you have questions about this medicine, talk to your doctor, pharmacist, or health care  provider.     © 2016, [employer] Standard. (2014-07-22 10:47:33)  Promethazine injection  What is this medicine?  PROMETHAZINE (proe METH a zeen) is an antihistamine. It is used to treat allergic reactions and to treat or prevent nausea and vomiting from illness or motion sickness. It is also used to make you sleep before surgery, and to help treat pain or nausea after surgery.  This medicine may be used for other purposes; ask your health care provider or pharmacist if you have questions.  What should I tell my health care provider before I take this medicine?  They need to know if you have any of these conditions:  -glaucoma  -high blood pressure or heart disease  -kidney disease  -liver disease  -lung or breathing disease, like asthma  -prostate trouble  -pain or difficulty passing urine  -seizures  -an unusual or allergic reaction to promethazine or phenothiazines, other medicines, foods, dyes, or preservatives  -pregnant or trying to get pregnant  -breast-feeding  How should I use this medicine?  This medicine is for injection into a muscle, or into a vein. It is given by a health care professional in a hospital or clinic setting.  Talk to your pediatrician regarding the use of this medicine in children. This medicine should not be given to infants and children younger than 2 years old.  Overdosage: If you think you have taken too much of this medicine contact a poison control center or emergency room at once.  NOTE: This medicine is only for you. Do not share this medicine with others.  What if I miss a dose?  This does not apply.  What may interact with this medicine?  Do not take this medicine with any of the following medications:  -cisapride  -dofetilide  -dronedarone  -MAOIs like Carbex, Eldepryl, Marplan, Nardil, Parnate  -pimozide  -quinidine, including dextromethorphan; quinidine  -thioridazine  -ziprasidone  This medicine may also interact with the following medications:  -certain medicines for  depression, anxiety, or psychotic disturbances  -certain medicines for anxiety or sleep  -certain medicines for seizures like carbamazepine, phenobarbital, phenytoin  -certain medicines for movement abnormalities as in Parkinson's disease, or for gastrointestinal problems  -epinephrine  -medicines for allergies or colds  -muscle relaxants  -narcotic medicines for pain  -other medicines that prolong the QT interval (cause an abnormal heart rhythm)  -tramadol  -trimethobenzamide  This list may not describe all possible interactions. Give your health care provider a list of all the medicines, herbs, non-prescription drugs, or dietary supplements you use. Also tell them if you smoke, drink alcohol, or use illegal drugs. Some items may interact with your medicine.  What should I watch for while using this medicine?  Your condition will be monitored carefully while you are receiving this medicine.  Your healthcare professional will discuss with you the risks and the benefits of using this medicine. This medicine has caused serious side effects in some patients after it was injected into a vein. Watch closely for any signs or symptoms of a local reaction like burning, pain, redness, swelling, and blistering and tell your healthcare professional immediately if any occur. These symptoms may occur when you receive the injection or may occur hours or even days after the injection.  You may get drowsy or dizzy. Do not drive, use machinery, or do anything that needs mental alertness until you know how this medicine affects you. To reduce the risk of dizzy or fainting spells, do not stand or sit up quickly, especially if you are an older patient. Alcohol may increase dizziness and drowsiness. Avoid alcoholic drinks.  Your mouth may get dry. Chewing sugarless gum or sucking hard candy, and drinking plenty of water will help.  This medicine may cause dry eyes and blurred vision. If you wear contact lenses you may feel some  discomfort. Lubricating drops may help. See your eye doctor if the problem does not go away or is severe.  Keep out of the sun, or wear protective clothing outdoors and use a sunscreen. Do not use sun lamps or sun tanning beds or booths.  If you are diabetic, check your blood-sugar levels regularly.  What side effects may I notice from receiving this medicine?  Side effects that you should report to your doctor or health care professional as soon as possible:  -allergic reactions like skin rash, itching or hives, swelling of the face, lips, or tongue  -blurred vision  -burning, blistering, pain, redness, and/or swelling at the injection site  -irregular heartbeat, palpitations or chest pain  -muscle or facial twitches  -pain or difficulty passing urine  -seizures  -slowed or shallow breathing  -unusual bleeding or bruising  -yellowing of the eyes or skin  Side effects that usually do not require medical attention (report to your doctor or health care professional if they continue or are bothersome):  -headache  -nightmares, agitation, nervousness, excitability, not able to sleep (these are more likely in children)  -stuffy nose  This list may not describe all possible side effects. Call your doctor for medical advice about side effects. You may report side effects to FDA at 6-567-FDA-4554.  Where should I keep my medicine?  This drug is given in a hospital or clinic and will not be stored at home.  NOTE: This sheet is a summary. It may not cover all possible information. If you have questions about this medicine, talk to your doctor, pharmacist, or health care provider.     © 2016, Elsevier/Gold Standard. (2014-08-20 16:03:14)  Butorphanol injection  What is this medicine?  BUTORPHANOL (byoo TOR fa nole) is a pain reliever. It is used to treat moderate to severe pain. It is also used to prevent pain before surgery and during child birth.  This medicine may be used for other purposes; ask your health care provider or  pharmacist if you have questions.  What should I tell my health care provider before I take this medicine?  They need to know if you have any of these conditions:  -brain tumor  -drug abuse or addiction  -head injury  -heart disease  -if you frequently drink alcohol containing drinks  -kidney disease  -liver disease  -lung or breathing disease, like asthma  -an allergic reaction to butorphanol, benzethonium chloride, other medicines, foods, dyes, or preservatives  -pregnant or trying to get pregnant  -breast-feeding  How should I use this medicine?  This medicine is for injection into a muscle or a vein. It is usually given by a health care professional in a hospital or clinic setting.  If you get this medicine at home, you will be taught how to prepare and give this medicine. Use exactly as directed. Take your medicine at regular intervals. Do not take your medicine more often than directed.  It is important that you put your used needles and syringes in a special sharps container. Do not put them in a trash can. If you do not have a sharps container, call your pharmacist or healthcare provider to get one.  Talk to your pediatrician regarding the use of this medicine in children. This medicine is not approved for use in children.  Overdosage: If you think you have taken too much of this medicine contact a poison control center or emergency room at once.  NOTE: This medicine is only for you. Do not share this medicine with others.  What if I miss a dose?  If you miss a dose, take it as soon as you can. If it is almost time for your next dose, take only that dose. Do not take double or extra doses.  What may interact with this medicine?  Do not take this medicine with any of the following medications:  -buprenorphine  -nalbuphine  -pentazocine  This medicine may also interact with the following medications:  -alcohol  -antihistamines for allergy, cough and cold  -barbiturates, like phenobarbital  -medicines for  depression, anxiety, or psychotic disturbances  -medicines for sleep  -muscle relaxants  -narcotic medicines (opiates) for pain  -tramadol  This list may not describe all possible interactions. Give your health care provider a list of all the medicines, herbs, non-prescription drugs, or dietary supplements you use. Also tell them if you smoke, drink alcohol, or use illegal drugs. Some items may interact with your medicine.  What should I watch for while using this medicine?  Tell your doctor or health care professional if your pain does not go away, if it gets worse, or if you have new or a different type of pain. You may develop tolerance to the medicine. Tolerance means that you will need a higher dose of the medicine for pain relief. Tolerance is normal and is expected if you take the medicine for a long time.  Do not suddenly stop taking your medicine because you may develop a severe reaction. Your body becomes used to the medicine. This does NOT mean you are addicted. Addiction is a behavior related to getting and using a drug for a non-medical reason. If you have pain, you have a medical reason to take pain medicine. Your doctor will tell you how much medicine to take. If your doctor wants you to stop the medicine, the dose will be slowly lowered over time to avoid any side effects.  You may get drowsy or dizzy. Do not drive, use machinery, or do anything that needs mental alertness until you know how this medicine affects you. Do not stand or sit up quickly, especially if you are an older patient. This reduces the risk of dizzy or fainting spells. Alcohol may interfere with the effect of this medicine. Avoid alcoholic drinks.  There are different types of narcotic medicines (opiates) for pain. If you take more than one type at the same time, you may have more side effects. Give your health care provider a list of all medicines you use. Your doctor will tell you how much medicine to take. Do not take more  medicine than directed. Call emergency for help if you have problems breathing.  This medicine will cause constipation. Try to have a bowel movement at least every 2 to 3 days. If you do not have a bowel movement for 3 days, call your doctor or health care professional.  This medicine may be used to treat migraines. If you take migraine medicines for 10 or more days a month, your migraines may get worse. Keep a diary of headache days and medicine use. Contact your healthcare professional if your migraine attacks occur more frequently.  What side effects may I notice from receiving this medicine?  Side effects that you should report to your doctor or health care professional as soon as possible:  -allergic reactions like skin rash, itching or hives, swelling of the face, lips, or tongue  -anxiety, nervousness, agitation  -breathing problems  -changes in vision  -confusion  -feeling faint or lightheaded, falls  -irregular or pounding heartbeat  -ringing in the ears  Side effects that usually do not require medical attention (report to your doctor or health care professional if they continue or are bothersome):  -difficulty sleeping at night  -dizziness  -drowsiness  -dry mouth  -headache  -loss of appetite  -nausea, vomiting  -pain, redness, or irritation at site where injected  This list may not describe all possible side effects. Call your doctor for medical advice about side effects. You may report side effects to FDA at 8-193-FDA-2912.  Where should I keep my medicine?  Keep out of the reach of children. This medicine can be abused. Keep your medicine in a safe place to protect it from theft. Do not share this medicine with anyone. Selling or giving away this medicine is dangerous and against the law.  If you are using this medicine at home, you will be instructed on how to store this medicine. Throw away any unused medicine after the expiration date on the label. Discard unused medicine and used packaging  carefully. Pets and children can be harmed if they find used or lost packages.  NOTE: This sheet is a summary. It may not cover all possible information. If you have questions about this medicine, talk to your doctor, pharmacist, or health care provider.     © 2016, Elsevier/Gold Standard. (2014-08-19 10:59:21)         To Do List     1/9/2017  3:00 PM     Appointment with [facility] at [facility] OUTPATIENT INFUSION NON ONCOLOGY ([phone])   [address] [facility] [address]       1/13/2017 3:00 PM     Appointment with MIGDALIA Balderas at [facility] UROLOGY ([phone])   Bring all previous medical records and films, along with current medications and insurance information.   [address]  [facility] [address]            Your Updated Medication List      ASK your doctor about these medications     azelastine 0.15 % solution nasal spray   Commonly known as:  ASTEPRO   2 sprays into each nostril 2 (Two) Times a Day.       azithromycin 250 MG tablet   Commonly known as:  ZITHROMAX   Take 2 tablets the first day, then 1 tablet daily for 4 days.       busPIRone 15 MG tablet   Commonly known as:  BUSPAR       butalbital-acetaminophen-caffeine -40 MG per tablet   Commonly known as:  FIORICET   Take 1-2 tablets by mouth Every 6 (Six) Hours As Needed for headaches or migraine.       cetirizine 10 MG tablet   Commonly known as:  zyrTEC   Take 1 tablet by mouth Daily for 30 days.       cyclobenzaprine 10 MG tablet   Commonly known as:  FLEXERIL       divalproex 500 MG DR tablet   Commonly known as:  DEPAKOTE       famotidine 20 MG tablet   Commonly known as:  PEPCID       fluticasone 50 MCG/ACT nasal spray   Commonly known as:  FLONASE   1 spray into each nostril 2 (Two) Times a Day for 30 days.       MethylPREDNISolone 4 MG tablet   Commonly known as:  MEDROL (BRET)   Take as directed on package instructions.       montelukast 10 MG tablet   Commonly known as:   SINGULAIR   Take 1 tablet by mouth Every Night for 30 days.       ondansetron 4 MG tablet   Commonly known as:  ZOFRAN   Take 1 tablet by mouth Every 6 (Six) Hours. PRN Nausea/vomiting       * ondansetron ODT 4 MG disintegrating tablet   Commonly known as:  ZOFRAN-ODT   Take 1 tablet by mouth 4 (Four) Times a Day As Needed for nausea or vomiting.       * ondansetron ODT 4 MG disintegrating tablet   Commonly known as:  ZOFRAN ODT   Take 1 tablet by mouth Every 8 (Eight) Hours As Needed for nausea.       oxyCODONE-acetaminophen 5-325 MG per tablet   Commonly known as:  PERCOCET   Take 1 tablet by mouth Every 6 (Six) Hours As Needed for moderate pain (4-6).       pantoprazole 40 MG EC tablet   Commonly known as:  PROTONIX       * promethazine 12.5 MG tablet   Commonly known as:  PHENERGAN   Take 1 tablet by mouth Every 6 (Six) Hours As Needed for nausea or vomiting.       * promethazine 12.5 MG tablet   Commonly known as:  PHENERGAN   Take 1 tablet by mouth Every 6 (Six) Hours As Needed for nausea or vomiting.       * promethazine 25 MG tablet   Commonly known as:  PHENERGAN       sertraline 100 MG tablet   Commonly known as:  ZOLOFT       SUMAtriptan 6 MG/0.5ML solution injection   Commonly known as:  IMITREX       topiramate 50 MG tablet   Commonly known as:  TOPAMAX       traMADol 50 MG tablet   Commonly known as:  ULTRAM   Take 1 tablet by mouth Every 6 (Six) Hours As Needed for moderate pain (4-6).       * Notice:  This list has 5 medication(s) that are the same as other medications prescribed for you. Read the directions carefully, and ask your doctor or other care provider to review them with you.            [employer] Signup     [employer] allows you to send messages to your doctor, view your test results, renew your prescriptions, schedule appointments, and more. To sign up, go to [employer]oly and click on the Sign Up Now link in the New User? box. Enter your [employer] Activation Code  "exactly as it appears below along with the last four digits of your Social Security Number and your Date of Birth () to complete the sign-up process. If you do not sign up before the expiration date, you must request a new code.    [employer] Activation Code: [email or web address]  Expires: 2017  5:40 AM    If you have questions, you can email [employer] [email or web address] or call [phone] to talk to our [employer] staff. Remember, [employer] is NOT to be used for urgent needs. For medical emergencies, dial 911.               Other Info from Your Visit           Allergies     Ativan [Lorazepam] Hallucinations    confusion    Sulfa Antibiotics Shortness Of Breath, Swelling    Compazine [Prochlorperazine Edisylate] Hives    Demerol [Meperidine] Hives    Droperidol     Toradol [Ketorolac Tromethamine] Hives, Itching      Reason for Visit     Injections           Vital Signs     Blood Pressure Pulse Temperature Respirations Height Weight    117/77 96 97.6 °F (36.4 °C) (Oral) 16 69\" (175.3 cm) 153 lb (69.4 kg)    Last Menstrual Period Body Mass Index Smoking Status             (LMP Unknown) 22.59 kg/m2 Former Smoker         Medications Administered     butorphanol (STADOL) injection 2 mg                  HYDROmorphone (DILAUDID) injection 1 mg                  promethazine (PHENERGAN) injection 25 mg                    Discharge Instructions     None         SYMPTOMS OF A STROKE    Call 911 or have someone take you to the Emergency Department if you have any of the following:    · Sudden numbness or weakness of your face, arm or leg especially on one side of the body  · Sudden confusion, diffiiculty speaking or trouble understanding   · Changes in your vision or loss of sight in one eye  · Sudden severe headache with no known cause  · sudden dizziness, trouble walking, loss of balance or coordination    It is important to seek emergency care right away if you have further stroke symptoms. If you get emergency help quickly, " the powerful clot-dissolving medicines can reduce the disabilities caused by a stroke.     For more information:    American Stroke Association  6-406-1-STROKE  www.strokeassociation.org           IF YOU SMOKE OR USE TOBACCO PLEASE READ THE FOLLOWING:    Why is smoking bad for me?  Smoking increases the risk of heart disease, lung disease, vascular disease, stroke, and cancer.     If you smoke, STOP!    If you would like more information on quitting smoking, please visit the [employer] website: [email or web address]   This link will provide additional resources including the QUIT line and the [employer] support groups.     For more information:    American Cancer Society  ([phone]    American Heart Association  1-599.294.2485

## 2017-01-10 ENCOUNTER — TRANSCRIBE ORDERS (OUTPATIENT)
Dept: INFUSION THERAPY | Facility: HOSPITAL | Age: 42
End: 2017-01-10

## 2017-01-10 ENCOUNTER — HOSPITAL ENCOUNTER (OUTPATIENT)
Dept: INFUSION THERAPY | Facility: HOSPITAL | Age: 42
Discharge: HOME | End: 2017-01-10
Attending: PSYCHIATRY & NEUROLOGY | Admitting: PSYCHIATRY & NEUROLOGY

## 2017-01-10 VITALS
HEART RATE: 66 BPM | RESPIRATION RATE: 18 BRPM | SYSTOLIC BLOOD PRESSURE: 119 MMHG | HEIGHT: 69 IN | DIASTOLIC BLOOD PRESSURE: 79 MMHG | WEIGHT: 153 LBS | BODY MASS INDEX: 22.66 KG/M2 | TEMPERATURE: 98.4 F

## 2017-01-10 DIAGNOSIS — G43.919 INTRACTABLE MIGRAINE, UNSPECIFIED MIGRAINE TYPE: Primary | ICD-10-CM

## 2017-01-10 DIAGNOSIS — G43.709 MIGRAINE, TRANSFORMED: Primary | ICD-10-CM

## 2017-01-10 PROCEDURE — 25010000002 HYDROMORPHONE PER 4 MG: Performed by: PSYCHIATRY & NEUROLOGY

## 2017-01-10 PROCEDURE — 25010000002 PROMETHAZINE PER 50 MG: Performed by: PSYCHIATRY & NEUROLOGY

## 2017-01-10 PROCEDURE — 25010000002 METHYLPREDNISOLONE PER 80 MG: Performed by: PSYCHIATRY & NEUROLOGY

## 2017-01-10 PROCEDURE — 96372 THER/PROPH/DIAG INJ SC/IM: CPT

## 2017-01-10 RX ORDER — PROMETHAZINE HYDROCHLORIDE 25 MG/ML
25 INJECTION, SOLUTION INTRAMUSCULAR; INTRAVENOUS ONCE
Status: COMPLETED | OUTPATIENT
Start: 2017-01-10 | End: 2017-01-10

## 2017-01-10 RX ORDER — METHYLPREDNISOLONE ACETATE 80 MG/ML
80 INJECTION, SUSPENSION INTRA-ARTICULAR; INTRALESIONAL; INTRAMUSCULAR; SOFT TISSUE ONCE
Status: COMPLETED | OUTPATIENT
Start: 2017-01-10 | End: 2017-01-10

## 2017-01-10 RX ADMIN — HYDROMORPHONE HYDROCHLORIDE 1 MG: 1 INJECTION, SOLUTION INTRAMUSCULAR; INTRAVENOUS; SUBCUTANEOUS at 15:23

## 2017-01-10 RX ADMIN — PROMETHAZINE HYDROCHLORIDE 25 MG: 25 INJECTION INTRAMUSCULAR; INTRAVENOUS at 15:22

## 2017-01-10 RX ADMIN — METHYLPREDNISOLONE ACETATE 80 MG: 80 INJECTION, SUSPENSION INTRA-ARTICULAR; INTRALESIONAL; INTRAMUSCULAR; SOFT TISSUE at 15:22

## 2017-01-10 NOTE — IP AVS SNAPSHOT
[facility] OUTPATIENT INFUSION NON ONCOLOGY  [phone]                    Susanna Camilo   1/10/2017  3:00 PM   INFUSION    Provider:  BED [facility]   Department:  [facility] OUTPATIENT INFUSION NON ONCOLOGY   Dept Phone:  [phone]                Your Full Care Plan           Instructions    Promethazine injection  What is this medicine?  PROMETHAZINE (proe METH a zeen) is an antihistamine. It is used to treat allergic reactions and to treat or prevent nausea and vomiting from illness or motion sickness. It is also used to make you sleep before surgery, and to help treat pain or nausea after surgery.  This medicine may be used for other purposes; ask your health care provider or pharmacist if you have questions.  What should I tell my health care provider before I take this medicine?  They need to know if you have any of these conditions:  -glaucoma  -high blood pressure or heart disease  -kidney disease  -liver disease  -lung or breathing disease, like asthma  -prostate trouble  -pain or difficulty passing urine  -seizures  -an unusual or allergic reaction to promethazine or phenothiazines, other medicines, foods, dyes, or preservatives  -pregnant or trying to get pregnant  -breast-feeding  How should I use this medicine?  This medicine is for injection into a muscle, or into a vein. It is given by a health care professional in a hospital or clinic setting.  Talk to your pediatrician regarding the use of this medicine in children. This medicine should not be given to infants and children younger than 2 years old.  Overdosage: If you think you have taken too much of this medicine contact a poison control center or emergency room at once.  NOTE: This medicine is only for you. Do not share this medicine with others.  What if I miss a dose?  This does not apply.  What may interact with this medicine?  Do not take this medicine with any of the following  medications:  -cisapride  -dofetilide  -dronedarone  -MAOIs like Carbex, Eldepryl, Marplan, Nardil, Parnate  -pimozide  -quinidine, including dextromethorphan; quinidine  -thioridazine  -ziprasidone  This medicine may also interact with the following medications:  -certain medicines for depression, anxiety, or psychotic disturbances  -certain medicines for anxiety or sleep  -certain medicines for seizures like carbamazepine, phenobarbital, phenytoin  -certain medicines for movement abnormalities as in Parkinson's disease, or for gastrointestinal problems  -epinephrine  -medicines for allergies or colds  -muscle relaxants  -narcotic medicines for pain  -other medicines that prolong the QT interval (cause an abnormal heart rhythm)  -tramadol  -trimethobenzamide  This list may not describe all possible interactions. Give your health care provider a list of all the medicines, herbs, non-prescription drugs, or dietary supplements you use. Also tell them if you smoke, drink alcohol, or use illegal drugs. Some items may interact with your medicine.  What should I watch for while using this medicine?  Your condition will be monitored carefully while you are receiving this medicine.  Your healthcare professional will discuss with you the risks and the benefits of using this medicine. This medicine has caused serious side effects in some patients after it was injected into a vein. Watch closely for any signs or symptoms of a local reaction like burning, pain, redness, swelling, and blistering and tell your healthcare professional immediately if any occur. These symptoms may occur when you receive the injection or may occur hours or even days after the injection.  You may get drowsy or dizzy. Do not drive, use machinery, or do anything that needs mental alertness until you know how this medicine affects you. To reduce the risk of dizzy or fainting spells, do not stand or sit up quickly, especially if you are an older patient.  Alcohol may increase dizziness and drowsiness. Avoid alcoholic drinks.  Your mouth may get dry. Chewing sugarless gum or sucking hard candy, and drinking plenty of water will help.  This medicine may cause dry eyes and blurred vision. If you wear contact lenses you may feel some discomfort. Lubricating drops may help. See your eye doctor if the problem does not go away or is severe.  Keep out of the sun, or wear protective clothing outdoors and use a sunscreen. Do not use sun lamps or sun tanning beds or booths.  If you are diabetic, check your blood-sugar levels regularly.  What side effects may I notice from receiving this medicine?  Side effects that you should report to your doctor or health care professional as soon as possible:  -allergic reactions like skin rash, itching or hives, swelling of the face, lips, or tongue  -blurred vision  -burning, blistering, pain, redness, and/or swelling at the injection site  -irregular heartbeat, palpitations or chest pain  -muscle or facial twitches  -pain or difficulty passing urine  -seizures  -slowed or shallow breathing  -unusual bleeding or bruising  -yellowing of the eyes or skin  Side effects that usually do not require medical attention (report to your doctor or health care professional if they continue or are bothersome):  -headache  -nightmares, agitation, nervousness, excitability, not able to sleep (these are more likely in children)  -stuffy nose  This list may not describe all possible side effects. Call your doctor for medical advice about side effects. You may report side effects to FDA at 3-009-FDA-1860.  Where should I keep my medicine?  This drug is given in a hospital or clinic and will not be stored at home.  NOTE: This sheet is a summary. It may not cover all possible information. If you have questions about this medicine, talk to your doctor, pharmacist, or health care provider.     © 2016, Elsevier/Gold Standard. (2014-08-20 16:03:14)  Hydromorphone  injection  What is this medicine?  HYDROMORPHONE (tomas droe MOR fone) is a pain reliever. It is used to treat moderate to severe pain.  This medicine may be used for other purposes; ask your health care provider or pharmacist if you have questions.  What should I tell my health care provider before I take this medicine?  They need to know if you have any of these conditions:  -brain tumor  -drug abuse or addiction  -head injury  -heart disease  -frequently drink alcohol containing drinks  -kidney disease  -liver disease  -lung disease, asthma, or breathing problems  -an allergic or unusual reaction to hydromorphone, other opioid analgesics, latex, other medicines, foods, dyes, or preservatives  -pregnant or trying to get pregnant  -breast-feeding  How should I use this medicine?  This medicine is for injection into a vein, into a muscle, or under the skin. It is usually given by a health care professional in a hospital or clinic setting.  If you get this medicine at home, you will be taught how to prepare and give this medicine. Use exactly as directed. Take your medicine at regular intervals. Do not take your medicine more often than directed.  It is important that you put your used needles and syringes in a special sharps container. Do not put them in a trash can. If you do not have a sharps container, call your pharmacist or healthcare provider to get one.  Talk to your pediatrician regarding the use of this medicine in children. This medicine is not approved for use in children.  Overdosage: If you think you have taken too much of this medicine contact a poison control center or emergency room at once.  NOTE: This medicine is only for you. Do not share this medicine with others.  What if I miss a dose?  If you miss a dose, use it as soon as you can. If it is almost time for your next dose, use only that dose. Do not use double or extra doses.  What may interact with this medicine?  -alcohol  -antihistamines for  allergy, cough and cold  -medicines for anesthesia  -medicines for depression, anxiety, or psychotic disturbances  -medicines for sleep  -muscle relaxants  -naltrexone  -narcotic medicines (opiates) for pain  -phenothiazines like chlorpromazine, mesoridazine, prochlorperazine, thioridazine  -tramadol  This list may not describe all possible interactions. Give your health care provider a list of all the medicines, herbs, non-prescription drugs, or dietary supplements you use. Also tell them if you smoke, drink alcohol, or use illegal drugs. Some items may interact with your medicine.  What should I watch for while using this medicine?  Tell your doctor or health care professional if your pain does not go away, if it gets worse, or if you have new or a different type of pain. You may develop tolerance to the medicine. Tolerance means that you will need a higher dose of the medicine for pain relief. Tolerance is normal and is expected if you take this medicine for a long time.  Do not suddenly stop taking your medicine because you may develop a severe reaction. Your body becomes used to the medicine. This does NOT mean you are addicted. Addiction is a behavior related to getting and using a drug for a non-medical reason. If you have pain, you have a medical reason to take pain medicine. Your doctor will tell you how much medicine to take. If your doctor wants you to stop the medicine, the dose will be slowly lowered over time to avoid any side effects.  You may get drowsy or dizzy. Do not drive, use machinery, or do anything that needs mental alertness until you know how this medicine affects you. Do not stand or sit up quickly, especially if you are an older patient. This reduces the risk of dizzy or fainting spells. Alcohol may interfere with the effect of this medicine. Avoid alcoholic drinks.  There are different types of narcotic medicines (opiates) for pain. If you take more than one type at the same time, you  may have more side effects. Give your health care provider a list of all medicines you use. Your doctor will tell you how much medicine to take. Do not take more medicine than directed. Call emergency for help if you have problems breathing.  This medicine will cause constipation. Try to have a bowel movement at least every 2 to 3 days. If you do not have a bowel movement for 3 days, call your doctor or health care professional.  Your mouth may get dry. Chewing sugarless gum or sucking hard candy, and drinking plenty of water may help. Contact your doctor if the problem does not go away or is severe.  What side effects may I notice from receiving this medicine?  Side effects that you should report to your doctor or health care professional as soon as possible:  -allergic reactions like skin rash, itching or hives, swelling of the face, lips, or tongue  -breathing problems  -changes in vision  -confusion  -feeling faint or lightheaded, falls  -seizures  -slow or fast heartbeat  -trouble passing urine or change in the amount of urine  -trouble with balance, talking, walking  -unusually weak or tired  Side effects that usually do not require medical attention (report to your doctor or health care professional if they continue or are bothersome):  -difficulty sleeping  -drowsiness  -dry mouth  -flushing  -headache  -itching  -loss of appetite  -nausea, vomiting  This list may not describe all possible side effects. Call your doctor for medical advice about side effects. You may report side effects to FDA at 4-693-FDA-1556.  Where should I keep my medicine?  Keep out of the reach of children. This medicine can be abused. Keep your medicine in a safe place to protect it from theft. Do not share this medicine with anyone. Selling or giving away this medicine is dangerous and against the law.  If you are using this medicine at home, you will be instructed on how to store this medicine.  This medicine may cause accidental  overdose and death if it is taken by other adults, children, or pets. Flush any unused medicine down the toilet to reduce the chance of harm. Do not use the medicine after the expiration date.  NOTE: This sheet is a summary. It may not cover all possible information. If you have questions about this medicine, talk to your doctor, pharmacist, or health care provider.     © 2016, Elsevier/Gold Standard. (2014-07-22 10:47:33)  Methylprednisolone Suspension for Injection  What is this medicine?  METHYLPREDNISOLONE (meth ill pred NISS oh lone) is a corticosteroid. It is commonly used to treat inflammation of the skin, joints, lungs, and other organs. Common conditions treated include asthma, allergies, and arthritis. It is also used for other conditions, such as blood disorders and diseases of the adrenal glands.  This medicine may be used for other purposes; ask your health care provider or pharmacist if you have questions.  What should I tell my health care provider before I take this medicine?  They need to know if you have any of these conditions:  -cataracts or glaucoma  -Cushings  -heart disease  -high blood pressure  -infection including tuberculosis  -low calcium or potassium levels in the blood  -recent surgery  -seizures  -stomach or intestinal disease, including colitis  -threadworms  -thyroid problems  -an unusual or allergic reaction to methylprednisolone, corticosteroids, benzyl alcohol, other medicines, foods, dyes, or preservatives  -pregnant or trying to get pregnant  -breast-feeding  How should I use this medicine?  This medicine is for injection into a muscle, joint, or other tissue. It is given by a health care professional in a hospital or clinic setting.  Talk to your pediatrician regarding the use of this medicine in children. While this drug may be prescribed for selected conditions, precautions do apply.  Overdosage: If you think you have taken too much of this medicine contact a poison control  center or emergency room at once.  NOTE: This medicine is only for you. Do not share this medicine with others.  What if I miss a dose?  This does not apply.  What may interact with this medicine?  Do not take this medicine with any of the following medications:  -mifepristone  This medicine may also interact with the following medications:  -aspirin and aspirin-like medicines  -cyclosporin  -ketoconazole  -phenobarbital  -phenytoin  -rifampin  -tacrolimus  -troleandomycin  -vaccines  -warfarin  This list may not describe all possible interactions. Give your health care provider a list of all the medicines, herbs, non-prescription drugs, or dietary supplements you use. Also tell them if you smoke, drink alcohol, or use illegal drugs. Some items may interact with your medicine.  What should I watch for while using this medicine?  Visit your doctor or health care professional for regular checks on your progress. If you are taking this medicine for a long time, carry an identification card with your name and address, the type and dose of your medicine, and your doctor's name and address.  The medicine may increase your risk of getting an infection. Stay away from people who are sick. Tell your doctor or health care professional if you are around anyone with measles or chickenpox.  You may need to avoid some vaccines. Talk to your health care provider for more information.  If you are going to have surgery, tell your doctor or health care professional that you have taken this medicine within the last twelve months.  Ask your doctor or health care professional about your diet. You may need to lower the amount of salt you eat.  The medicine can increase your blood sugar. If you are a diabetic check with your doctor if you need help adjusting the dose of your diabetic medicine.  What side effects may I notice from receiving this medicine?  Side effects that you should report to your doctor or health care professional as  soon as possible:  -allergic reactions like skin rash, itching or hives, swelling of the face, lips, or tongue  -bloody or tarry stools  -changes in vision  -eye pain or bulging eyes  -fever, sore throat, sneezing, cough, or other signs of infection, wounds that will not heal  -increased thirst  -irregular heartbeat  -muscle cramps  -pain in hips, back, ribs, arms, shoulders, or legs  -swelling of the ankles, feet, hands  -trouble passing urine or change in the amount of urine  -unusual bleeding or bruising  -unusually weak or tired  -weight gain or weight loss  Side effects that usually do not require medical attention (report to your doctor or health care professional if they continue or are bothersome):  -changes in emotions or moods  -constipation or diarrhea  -headache  -irritation at site where injected  -nausea, vomiting  -skin problems, acne, thin and shiny skin  -trouble sleeping  -unusual hair growth on the face or body  This list may not describe all possible side effects. Call your doctor for medical advice about side effects. You may report side effects to FDA at 7-006-FDA-6878.  Where should I keep my medicine?  This drug is given in a hospital or clinic and will not be stored at home.  NOTE: This sheet is a summary. It may not cover all possible information. If you have questions about this medicine, talk to your doctor, pharmacist, or health care provider.     © 2016, Elsevier/Gold Standard. (2013-09-17 11:37:56)         To Do List     1/13/2017 3:00 PM     Appointment with MIGDALIA Balderas at [facility] UROLOGY ([phone])   Bring all previous medical records and films, along with current medications and insurance information.   [address] [place] [address]            Your Updated Medication List      ASK your doctor about these medications     azelastine 0.15 % solution nasal spray   Commonly known as:  ASTEPRO   2 sprays into each nostril 2 (Two) Times a Day.        azithromycin 250 MG tablet   Commonly known as:  ZITHROMAX   Take 2 tablets the first day, then 1 tablet daily for 4 days.       busPIRone 15 MG tablet   Commonly known as:  BUSPAR       butalbital-acetaminophen-caffeine -40 MG per tablet   Commonly known as:  FIORICET   Take 1-2 tablets by mouth Every 6 (Six) Hours As Needed for headaches or migraine.       cetirizine 10 MG tablet   Commonly known as:  zyrTEC   Take 1 tablet by mouth Daily for 30 days.       cyclobenzaprine 10 MG tablet   Commonly known as:  FLEXERIL       divalproex 500 MG DR tablet   Commonly known as:  DEPAKOTE       famotidine 20 MG tablet   Commonly known as:  PEPCID       fluticasone 50 MCG/ACT nasal spray   Commonly known as:  FLONASE   1 spray into each nostril 2 (Two) Times a Day for 30 days.       MethylPREDNISolone 4 MG tablet   Commonly known as:  MEDROL (BRET)   Take as directed on package instructions.       montelukast 10 MG tablet   Commonly known as:  SINGULAIR   Take 1 tablet by mouth Every Night for 30 days.       ondansetron 4 MG tablet   Commonly known as:  ZOFRAN   Take 1 tablet by mouth Every 6 (Six) Hours. PRN Nausea/vomiting       * ondansetron ODT 4 MG disintegrating tablet   Commonly known as:  ZOFRAN-ODT   Take 1 tablet by mouth 4 (Four) Times a Day As Needed for nausea or vomiting.       * ondansetron ODT 4 MG disintegrating tablet   Commonly known as:  ZOFRAN ODT   Take 1 tablet by mouth Every 8 (Eight) Hours As Needed for nausea.       oxyCODONE-acetaminophen 5-325 MG per tablet   Commonly known as:  PERCOCET   Take 1 tablet by mouth Every 6 (Six) Hours As Needed for moderate pain (4-6).       pantoprazole 40 MG EC tablet   Commonly known as:  PROTONIX       * promethazine 12.5 MG tablet   Commonly known as:  PHENERGAN   Take 1 tablet by mouth Every 6 (Six) Hours As Needed for nausea or vomiting.       * promethazine 12.5 MG tablet   Commonly known as:  PHENERGAN   Take 1 tablet by mouth Every 6 (Six) Hours  As Needed for nausea or vomiting.       * promethazine 25 MG tablet   Commonly known as:  PHENERGAN       sertraline 100 MG tablet   Commonly known as:  ZOLOFT       SUMAtriptan 6 MG/0.5ML solution injection   Commonly known as:  IMITREX       topiramate 50 MG tablet   Commonly known as:  TOPAMAX       traMADol 50 MG tablet   Commonly known as:  ULTRAM   Take 1 tablet by mouth Every 6 (Six) Hours As Needed for moderate pain (4-6).       * Notice:  This list has 5 medication(s) that are the same as other medications prescribed for you. Read the directions carefully, and ask your doctor or other care provider to review them with you.            ConnectFu Signup     [facility] ConnectFu allows you to send messages to your doctor, view your test results, renew your prescriptions, schedule appointments, and more. To sign up, go to [employer] and click on the Sign Up Now link in the New User? box. Enter your Connect[employer] Activation Code exactly as it appears below along with the last four digits of your Social Security Number and your Date of Birth () to complete the sign-up process. If you do not sign up before the expiration date, you must request a new code.    ConnectFu Activation Code: [email or web address]  Expires: 2017  5:40 AM    If you have questions, you can email [email or web address] or call [phone] to talk to our ConnectFu staff. Remember, ConnectFu is NOT to be used for urgent needs. For medical emergencies, dial 911.               Other Info from Your Visit           Allergies     Ativan [Lorazepam] Hallucinations    confusion    Sulfa Antibiotics Shortness Of Breath, Swelling    Compazine [Prochlorperazine Edisylate] Hives    Demerol [Meperidine] Hives    Droperidol     Prochlorperazine     Toradol [Ketorolac Tromethamine] Hives, Itching      Reason for Visit     Injections DILAUDID/ PHENERGAN/ DEPO-MEDROL FOR MIGRAINE.      Vital Signs     Blood Pressure Pulse Temperature Respirations  "Height Weight    122/89 (BP Location: Right arm, Patient Position: Sitting) 72 98.4 °F (36.9 °C) (Oral) 18 69\" (175.3 cm) 153 lb (69.4 kg)    Last Menstrual Period Body Mass Index Smoking Status             (LMP Unknown) 22.59 kg/m2 Former Smoker         Medications Administered     HYDROmorphone (DILAUDID) injection 1 mg                  methylPREDNISolone acetate (DEPO-medrol) injection 80 mg                  promethazine (PHENERGAN) injection 25 mg                    Discharge Instructions     None         SYMPTOMS OF A STROKE    Call 911 or have someone take you to the Emergency Department if you have any of the following:    · Sudden numbness or weakness of your face, arm or leg especially on one side of the body  · Sudden confusion, diffiiculty speaking or trouble understanding   · Changes in your vision or loss of sight in one eye  · Sudden severe headache with no known cause  · sudden dizziness, trouble walking, loss of balance or coordination    It is important to seek emergency care right away if you have further stroke symptoms. If you get emergency help quickly, the powerful clot-dissolving medicines can reduce the disabilities caused by a stroke.     For more information:    American Stroke Association  3-170-2-STROKE  www.strokeassociation.org           IF YOU SMOKE OR USE TOBACCO PLEASE READ THE FOLLOWING:    Why is smoking bad for me?  Smoking increases the risk of heart disease, lung disease, vascular disease, stroke, and cancer.     If you smoke, STOP!    If you would like more information on quitting smoking, please visit the [employer] website: [email or web address] for Google [email or web address]   This link will provide additional resources including the QUIT line and the Beat the Pack support groups.     For more information:    American Cancer Society  [phone]    American Heart Association  1-160.685.2218          "

## 2017-01-10 NOTE — PATIENT INSTRUCTIONS
Promethazine injection  What is this medicine?  PROMETHAZINE (proe METH a zeen) is an antihistamine. It is used to treat allergic reactions and to treat or prevent nausea and vomiting from illness or motion sickness. It is also used to make you sleep before surgery, and to help treat pain or nausea after surgery.  This medicine may be used for other purposes; ask your health care provider or pharmacist if you have questions.  What should I tell my health care provider before I take this medicine?  They need to know if you have any of these conditions:  -glaucoma  -high blood pressure or heart disease  -kidney disease  -liver disease  -lung or breathing disease, like asthma  -prostate trouble  -pain or difficulty passing urine  -seizures  -an unusual or allergic reaction to promethazine or phenothiazines, other medicines, foods, dyes, or preservatives  -pregnant or trying to get pregnant  -breast-feeding  How should I use this medicine?  This medicine is for injection into a muscle, or into a vein. It is given by a health care professional in a hospital or clinic setting.  Talk to your pediatrician regarding the use of this medicine in children. This medicine should not be given to infants and children younger than 2 years old.  Overdosage: If you think you have taken too much of this medicine contact a poison control center or emergency room at once.  NOTE: This medicine is only for you. Do not share this medicine with others.  What if I miss a dose?  This does not apply.  What may interact with this medicine?  Do not take this medicine with any of the following medications:  -cisapride  -dofetilide  -dronedarone  -MAOIs like Carbex, Eldepryl, Marplan, Nardil, Parnate  -pimozide  -quinidine, including dextromethorphan; quinidine  -thioridazine  -ziprasidone  This medicine may also interact with the following medications:  -certain medicines for depression, anxiety, or psychotic disturbances  -certain medicines for  anxiety or sleep  -certain medicines for seizures like carbamazepine, phenobarbital, phenytoin  -certain medicines for movement abnormalities as in Parkinson's disease, or for gastrointestinal problems  -epinephrine  -medicines for allergies or colds  -muscle relaxants  -narcotic medicines for pain  -other medicines that prolong the QT interval (cause an abnormal heart rhythm)  -tramadol  -trimethobenzamide  This list may not describe all possible interactions. Give your health care provider a list of all the medicines, herbs, non-prescription drugs, or dietary supplements you use. Also tell them if you smoke, drink alcohol, or use illegal drugs. Some items may interact with your medicine.  What should I watch for while using this medicine?  Your condition will be monitored carefully while you are receiving this medicine.  Your healthcare professional will discuss with you the risks and the benefits of using this medicine. This medicine has caused serious side effects in some patients after it was injected into a vein. Watch closely for any signs or symptoms of a local reaction like burning, pain, redness, swelling, and blistering and tell your healthcare professional immediately if any occur. These symptoms may occur when you receive the injection or may occur hours or even days after the injection.  You may get drowsy or dizzy. Do not drive, use machinery, or do anything that needs mental alertness until you know how this medicine affects you. To reduce the risk of dizzy or fainting spells, do not stand or sit up quickly, especially if you are an older patient. Alcohol may increase dizziness and drowsiness. Avoid alcoholic drinks.  Your mouth may get dry. Chewing sugarless gum or sucking hard candy, and drinking plenty of water will help.  This medicine may cause dry eyes and blurred vision. If you wear contact lenses you may feel some discomfort. Lubricating drops may help. See your eye doctor if the problem does  not go away or is severe.  Keep out of the sun, or wear protective clothing outdoors and use a sunscreen. Do not use sun lamps or sun tanning beds or booths.  If you are diabetic, check your blood-sugar levels regularly.  What side effects may I notice from receiving this medicine?  Side effects that you should report to your doctor or health care professional as soon as possible:  -allergic reactions like skin rash, itching or hives, swelling of the face, lips, or tongue  -blurred vision  -burning, blistering, pain, redness, and/or swelling at the injection site  -irregular heartbeat, palpitations or chest pain  -muscle or facial twitches  -pain or difficulty passing urine  -seizures  -slowed or shallow breathing  -unusual bleeding or bruising  -yellowing of the eyes or skin  Side effects that usually do not require medical attention (report to your doctor or health care professional if they continue or are bothersome):  -headache  -nightmares, agitation, nervousness, excitability, not able to sleep (these are more likely in children)  -stuffy nose  This list may not describe all possible side effects. Call your doctor for medical advice about side effects. You may report side effects to FDA at 0-462-FDA-6181.  Where should I keep my medicine?  This drug is given in a hospital or clinic and will not be stored at home.  NOTE: This sheet is a summary. It may not cover all possible information. If you have questions about this medicine, talk to your doctor, pharmacist, or health care provider.     © 2016, [employer]. (2014-08-20 16:03:14)  Hydromorphone injection  What is this medicine?  HYDROMORPHONE (tomas droe MOR fone) is a pain reliever. It is used to treat moderate to severe pain.  This medicine may be used for other purposes; ask your health care provider or pharmacist if you have questions.  What should I tell my health care provider before I take this medicine?  They need to know if you have any of  these conditions:  -brain tumor  -drug abuse or addiction  -head injury  -heart disease  -frequently drink alcohol containing drinks  -kidney disease  -liver disease  -lung disease, asthma, or breathing problems  -an allergic or unusual reaction to hydromorphone, other opioid analgesics, latex, other medicines, foods, dyes, or preservatives  -pregnant or trying to get pregnant  -breast-feeding  How should I use this medicine?  This medicine is for injection into a vein, into a muscle, or under the skin. It is usually given by a health care professional in a hospital or clinic setting.  If you get this medicine at home, you will be taught how to prepare and give this medicine. Use exactly as directed. Take your medicine at regular intervals. Do not take your medicine more often than directed.  It is important that you put your used needles and syringes in a special sharps container. Do not put them in a trash can. If you do not have a sharps container, call your pharmacist or healthcare provider to get one.  Talk to your pediatrician regarding the use of this medicine in children. This medicine is not approved for use in children.  Overdosage: If you think you have taken too much of this medicine contact a poison control center or emergency room at once.  NOTE: This medicine is only for you. Do not share this medicine with others.  What if I miss a dose?  If you miss a dose, use it as soon as you can. If it is almost time for your next dose, use only that dose. Do not use double or extra doses.  What may interact with this medicine?  -alcohol  -antihistamines for allergy, cough and cold  -medicines for anesthesia  -medicines for depression, anxiety, or psychotic disturbances  -medicines for sleep  -muscle relaxants  -naltrexone  -narcotic medicines (opiates) for pain  -phenothiazines like chlorpromazine, mesoridazine, prochlorperazine, thioridazine  -tramadol  This list may not describe all possible interactions.  Give your health care provider a list of all the medicines, herbs, non-prescription drugs, or dietary supplements you use. Also tell them if you smoke, drink alcohol, or use illegal drugs. Some items may interact with your medicine.  What should I watch for while using this medicine?  Tell your doctor or health care professional if your pain does not go away, if it gets worse, or if you have new or a different type of pain. You may develop tolerance to the medicine. Tolerance means that you will need a higher dose of the medicine for pain relief. Tolerance is normal and is expected if you take this medicine for a long time.  Do not suddenly stop taking your medicine because you may develop a severe reaction. Your body becomes used to the medicine. This does NOT mean you are addicted. Addiction is a behavior related to getting and using a drug for a non-medical reason. If you have pain, you have a medical reason to take pain medicine. Your doctor will tell you how much medicine to take. If your doctor wants you to stop the medicine, the dose will be slowly lowered over time to avoid any side effects.  You may get drowsy or dizzy. Do not drive, use machinery, or do anything that needs mental alertness until you know how this medicine affects you. Do not stand or sit up quickly, especially if you are an older patient. This reduces the risk of dizzy or fainting spells. Alcohol may interfere with the effect of this medicine. Avoid alcoholic drinks.  There are different types of narcotic medicines (opiates) for pain. If you take more than one type at the same time, you may have more side effects. Give your health care provider a list of all medicines you use. Your doctor will tell you how much medicine to take. Do not take more medicine than directed. Call emergency for help if you have problems breathing.  This medicine will cause constipation. Try to have a bowel movement at least every 2 to 3 days. If you do not have a  bowel movement for 3 days, call your doctor or health care professional.  Your mouth may get dry. Chewing sugarless gum or sucking hard candy, and drinking plenty of water may help. Contact your doctor if the problem does not go away or is severe.  What side effects may I notice from receiving this medicine?  Side effects that you should report to your doctor or health care professional as soon as possible:  -allergic reactions like skin rash, itching or hives, swelling of the face, lips, or tongue  -breathing problems  -changes in vision  -confusion  -feeling faint or lightheaded, falls  -seizures  -slow or fast heartbeat  -trouble passing urine or change in the amount of urine  -trouble with balance, talking, walking  -unusually weak or tired  Side effects that usually do not require medical attention (report to your doctor or health care professional if they continue or are bothersome):  -difficulty sleeping  -drowsiness  -dry mouth  -flushing  -headache  -itching  -loss of appetite  -nausea, vomiting  This list may not describe all possible side effects. Call your doctor for medical advice about side effects. You may report side effects to FDA at 1-311-FDA-4918.  Where should I keep my medicine?  Keep out of the reach of children. This medicine can be abused. Keep your medicine in a safe place to protect it from theft. Do not share this medicine with anyone. Selling or giving away this medicine is dangerous and against the law.  If you are using this medicine at home, you will be instructed on how to store this medicine.  This medicine may cause accidental overdose and death if it is taken by other adults, children, or pets. Flush any unused medicine down the toilet to reduce the chance of harm. Do not use the medicine after the expiration date.  NOTE: This sheet is a summary. It may not cover all possible information. If you have questions about this medicine, talk to your doctor, pharmacist, or health care  provider.     © 2016, [employer]. (2014-07-22 10:47:33)  Methylprednisolone Suspension for Injection  What is this medicine?  METHYLPREDNISOLONE (meth ill pred NISS oh lone) is a corticosteroid. It is commonly used to treat inflammation of the skin, joints, lungs, and other organs. Common conditions treated include asthma, allergies, and arthritis. It is also used for other conditions, such as blood disorders and diseases of the adrenal glands.  This medicine may be used for other purposes; ask your health care provider or pharmacist if you have questions.  What should I tell my health care provider before I take this medicine?  They need to know if you have any of these conditions:  -cataracts or glaucoma  -Cushings  -heart disease  -high blood pressure  -infection including tuberculosis  -low calcium or potassium levels in the blood  -recent surgery  -seizures  -stomach or intestinal disease, including colitis  -threadworms  -thyroid problems  -an unusual or allergic reaction to methylprednisolone, corticosteroids, benzyl alcohol, other medicines, foods, dyes, or preservatives  -pregnant or trying to get pregnant  -breast-feeding  How should I use this medicine?  This medicine is for injection into a muscle, joint, or other tissue. It is given by a health care professional in a hospital or clinic setting.  Talk to your pediatrician regarding the use of this medicine in children. While this drug may be prescribed for selected conditions, precautions do apply.  Overdosage: If you think you have taken too much of this medicine contact a poison control center or emergency room at once.  NOTE: This medicine is only for you. Do not share this medicine with others.  What if I miss a dose?  This does not apply.  What may interact with this medicine?  Do not take this medicine with any of the following medications:  -mifepristone  This medicine may also interact with the following medications:  -aspirin and  aspirin-like medicines  -cyclosporin  -ketoconazole  -phenobarbital  -phenytoin  -rifampin  -tacrolimus  -troleandomycin  -vaccines  -warfarin  This list may not describe all possible interactions. Give your health care provider a list of all the medicines, herbs, non-prescription drugs, or dietary supplements you use. Also tell them if you smoke, drink alcohol, or use illegal drugs. Some items may interact with your medicine.  What should I watch for while using this medicine?  Visit your doctor or health care professional for regular checks on your progress. If you are taking this medicine for a long time, carry an identification card with your name and address, the type and dose of your medicine, and your doctor's name and address.  The medicine may increase your risk of getting an infection. Stay away from people who are sick. Tell your doctor or health care professional if you are around anyone with measles or chickenpox.  You may need to avoid some vaccines. Talk to your health care provider for more information.  If you are going to have surgery, tell your doctor or health care professional that you have taken this medicine within the last twelve months.  Ask your doctor or health care professional about your diet. You may need to lower the amount of salt you eat.  The medicine can increase your blood sugar. If you are a diabetic check with your doctor if you need help adjusting the dose of your diabetic medicine.  What side effects may I notice from receiving this medicine?  Side effects that you should report to your doctor or health care professional as soon as possible:  -allergic reactions like skin rash, itching or hives, swelling of the face, lips, or tongue  -bloody or tarry stools  -changes in vision  -eye pain or bulging eyes  -fever, sore throat, sneezing, cough, or other signs of infection, wounds that will not heal  -increased thirst  -irregular heartbeat  -muscle cramps  -pain in hips, back,  ribs, arms, shoulders, or legs  -swelling of the ankles, feet, hands  -trouble passing urine or change in the amount of urine  -unusual bleeding or bruising  -unusually weak or tired  -weight gain or weight loss  Side effects that usually do not require medical attention (report to your doctor or health care professional if they continue or are bothersome):  -changes in emotions or moods  -constipation or diarrhea  -headache  -irritation at site where injected  -nausea, vomiting  -skin problems, acne, thin and shiny skin  -trouble sleeping  -unusual hair growth on the face or body  This list may not describe all possible side effects. Call your doctor for medical advice about side effects. You may report side effects to FDA at 7-845-FDA-4408.  Where should I keep my medicine?  This drug is given in a hospital or clinic and will not be stored at home.  NOTE: This sheet is a summary. It may not cover all possible information. If you have questions about this medicine, talk to your doctor, pharmacist, or health care provider.     © 2016, [employer]. (2013-09-17 11:37:56)

## 2017-01-10 NOTE — MR AVS SNAPSHOT
[facility] OUTPATIENT INFUSION NON ONCOLOGY  [phone]                    Susanna Camilo   1/10/2017  3:00 PM   INFUSION    Provider:  [facility]   Department:  [facility] OUTPATIENT INFUSION NON ONCOLOGY   Dept Phone:  [phone]                Your Full Care Plan           Instructions    Promethazine injection  What is this medicine?  PROMETHAZINE (proe METH a zeen) is an antihistamine. It is used to treat allergic reactions and to treat or prevent nausea and vomiting from illness or motion sickness. It is also used to make you sleep before surgery, and to help treat pain or nausea after surgery.  This medicine may be used for other purposes; ask your health care provider or pharmacist if you have questions.  What should I tell my health care provider before I take this medicine?  They need to know if you have any of these conditions:  -glaucoma  -high blood pressure or heart disease  -kidney disease  -liver disease  -lung or breathing disease, like asthma  -prostate trouble  -pain or difficulty passing urine  -seizures  -an unusual or allergic reaction to promethazine or phenothiazines, other medicines, foods, dyes, or preservatives  -pregnant or trying to get pregnant  -breast-feeding  How should I use this medicine?  This medicine is for injection into a muscle, or into a vein. It is given by a health care professional in a hospital or clinic setting.  Talk to your pediatrician regarding the use of this medicine in children. This medicine should not be given to infants and children younger than 2 years old.  Overdosage: If you think you have taken too much of this medicine contact a poison control center or emergency room at once.  NOTE: This medicine is only for you. Do not share this medicine with others.  What if I miss a dose?  This does not apply.  What may interact with this medicine?  Do not take this medicine with any of the following  medications:  -cisapride  -dofetilide  -dronedarone  -MAOIs like Carbex, Eldepryl, Marplan, Nardil, Parnate  -pimozide  -quinidine, including dextromethorphan; quinidine  -thioridazine  -ziprasidone  This medicine may also interact with the following medications:  -certain medicines for depression, anxiety, or psychotic disturbances  -certain medicines for anxiety or sleep  -certain medicines for seizures like carbamazepine, phenobarbital, phenytoin  -certain medicines for movement abnormalities as in Parkinson's disease, or for gastrointestinal problems  -epinephrine  -medicines for allergies or colds  -muscle relaxants  -narcotic medicines for pain  -other medicines that prolong the QT interval (cause an abnormal heart rhythm)  -tramadol  -trimethobenzamide  This list may not describe all possible interactions. Give your health care provider a list of all the medicines, herbs, non-prescription drugs, or dietary supplements you use. Also tell them if you smoke, drink alcohol, or use illegal drugs. Some items may interact with your medicine.  What should I watch for while using this medicine?  Your condition will be monitored carefully while you are receiving this medicine.  Your healthcare professional will discuss with you the risks and the benefits of using this medicine. This medicine has caused serious side effects in some patients after it was injected into a vein. Watch closely for any signs or symptoms of a local reaction like burning, pain, redness, swelling, and blistering and tell your healthcare professional immediately if any occur. These symptoms may occur when you receive the injection or may occur hours or even days after the injection.  You may get drowsy or dizzy. Do not drive, use machinery, or do anything that needs mental alertness until you know how this medicine affects you. To reduce the risk of dizzy or fainting spells, do not stand or sit up quickly, especially if you are an older patient.  Alcohol may increase dizziness and drowsiness. Avoid alcoholic drinks.  Your mouth may get dry. Chewing sugarless gum or sucking hard candy, and drinking plenty of water will help.  This medicine may cause dry eyes and blurred vision. If you wear contact lenses you may feel some discomfort. Lubricating drops may help. See your eye doctor if the problem does not go away or is severe.  Keep out of the sun, or wear protective clothing outdoors and use a sunscreen. Do not use sun lamps or sun tanning beds or booths.  If you are diabetic, check your blood-sugar levels regularly.  What side effects may I notice from receiving this medicine?  Side effects that you should report to your doctor or health care professional as soon as possible:  -allergic reactions like skin rash, itching or hives, swelling of the face, lips, or tongue  -blurred vision  -burning, blistering, pain, redness, and/or swelling at the injection site  -irregular heartbeat, palpitations or chest pain  -muscle or facial twitches  -pain or difficulty passing urine  -seizures  -slowed or shallow breathing  -unusual bleeding or bruising  -yellowing of the eyes or skin  Side effects that usually do not require medical attention (report to your doctor or health care professional if they continue or are bothersome):  -headache  -nightmares, agitation, nervousness, excitability, not able to sleep (these are more likely in children)  -stuffy nose  This list may not describe all possible side effects. Call your doctor for medical advice about side effects. You may report side effects to FDA at 0-885-FDA-1001.  Where should I keep my medicine?  This drug is given in a hospital or clinic and will not be stored at home.  NOTE: This sheet is a summary. It may not cover all possible information. If you have questions about this medicine, talk to your doctor, pharmacist, or health care provider.     © 2016, Elsevier/Gold Standard. (2014-08-20 16:03:14)  Hydromorphone  injection  What is this medicine?  HYDROMORPHONE (tomas droe MOR fone) is a pain reliever. It is used to treat moderate to severe pain.  This medicine may be used for other purposes; ask your health care provider or pharmacist if you have questions.  What should I tell my health care provider before I take this medicine?  They need to know if you have any of these conditions:  -brain tumor  -drug abuse or addiction  -head injury  -heart disease  -frequently drink alcohol containing drinks  -kidney disease  -liver disease  -lung disease, asthma, or breathing problems  -an allergic or unusual reaction to hydromorphone, other opioid analgesics, latex, other medicines, foods, dyes, or preservatives  -pregnant or trying to get pregnant  -breast-feeding  How should I use this medicine?  This medicine is for injection into a vein, into a muscle, or under the skin. It is usually given by a health care professional in a hospital or clinic setting.  If you get this medicine at home, you will be taught how to prepare and give this medicine. Use exactly as directed. Take your medicine at regular intervals. Do not take your medicine more often than directed.  It is important that you put your used needles and syringes in a special sharps container. Do not put them in a trash can. If you do not have a sharps container, call your pharmacist or healthcare provider to get one.  Talk to your pediatrician regarding the use of this medicine in children. This medicine is not approved for use in children.  Overdosage: If you think you have taken too much of this medicine contact a poison control center or emergency room at once.  NOTE: This medicine is only for you. Do not share this medicine with others.  What if I miss a dose?  If you miss a dose, use it as soon as you can. If it is almost time for your next dose, use only that dose. Do not use double or extra doses.  What may interact with this medicine?  -alcohol  -antihistamines for  allergy, cough and cold  -medicines for anesthesia  -medicines for depression, anxiety, or psychotic disturbances  -medicines for sleep  -muscle relaxants  -naltrexone  -narcotic medicines (opiates) for pain  -phenothiazines like chlorpromazine, mesoridazine, prochlorperazine, thioridazine  -tramadol  This list may not describe all possible interactions. Give your health care provider a list of all the medicines, herbs, non-prescription drugs, or dietary supplements you use. Also tell them if you smoke, drink alcohol, or use illegal drugs. Some items may interact with your medicine.  What should I watch for while using this medicine?  Tell your doctor or health care professional if your pain does not go away, if it gets worse, or if you have new or a different type of pain. You may develop tolerance to the medicine. Tolerance means that you will need a higher dose of the medicine for pain relief. Tolerance is normal and is expected if you take this medicine for a long time.  Do not suddenly stop taking your medicine because you may develop a severe reaction. Your body becomes used to the medicine. This does NOT mean you are addicted. Addiction is a behavior related to getting and using a drug for a non-medical reason. If you have pain, you have a medical reason to take pain medicine. Your doctor will tell you how much medicine to take. If your doctor wants you to stop the medicine, the dose will be slowly lowered over time to avoid any side effects.  You may get drowsy or dizzy. Do not drive, use machinery, or do anything that needs mental alertness until you know how this medicine affects you. Do not stand or sit up quickly, especially if you are an older patient. This reduces the risk of dizzy or fainting spells. Alcohol may interfere with the effect of this medicine. Avoid alcoholic drinks.  There are different types of narcotic medicines (opiates) for pain. If you take more than one type at the same time, you  may have more side effects. Give your health care provider a list of all medicines you use. Your doctor will tell you how much medicine to take. Do not take more medicine than directed. Call emergency for help if you have problems breathing.  This medicine will cause constipation. Try to have a bowel movement at least every 2 to 3 days. If you do not have a bowel movement for 3 days, call your doctor or health care professional.  Your mouth may get dry. Chewing sugarless gum or sucking hard candy, and drinking plenty of water may help. Contact your doctor if the problem does not go away or is severe.  What side effects may I notice from receiving this medicine?  Side effects that you should report to your doctor or health care professional as soon as possible:  -allergic reactions like skin rash, itching or hives, swelling of the face, lips, or tongue  -breathing problems  -changes in vision  -confusion  -feeling faint or lightheaded, falls  -seizures  -slow or fast heartbeat  -trouble passing urine or change in the amount of urine  -trouble with balance, talking, walking  -unusually weak or tired  Side effects that usually do not require medical attention (report to your doctor or health care professional if they continue or are bothersome):  -difficulty sleeping  -drowsiness  -dry mouth  -flushing  -headache  -itching  -loss of appetite  -nausea, vomiting  This list may not describe all possible side effects. Call your doctor for medical advice about side effects. You may report side effects to FDA at 2-192-FDA-7947.  Where should I keep my medicine?  Keep out of the reach of children. This medicine can be abused. Keep your medicine in a safe place to protect it from theft. Do not share this medicine with anyone. Selling or giving away this medicine is dangerous and against the law.  If you are using this medicine at home, you will be instructed on how to store this medicine.  This medicine may cause accidental  overdose and death if it is taken by other adults, children, or pets. Flush any unused medicine down the toilet to reduce the chance of harm. Do not use the medicine after the expiration date.  NOTE: This sheet is a summary. It may not cover all possible information. If you have questions about this medicine, talk to your doctor, pharmacist, or health care provider.     © 2016, Elsevier/Gold Standard. (2014-07-22 10:47:33)  Methylprednisolone Suspension for Injection  What is this medicine?  METHYLPREDNISOLONE (meth ill pred NISS oh lone) is a corticosteroid. It is commonly used to treat inflammation of the skin, joints, lungs, and other organs. Common conditions treated include asthma, allergies, and arthritis. It is also used for other conditions, such as blood disorders and diseases of the adrenal glands.  This medicine may be used for other purposes; ask your health care provider or pharmacist if you have questions.  What should I tell my health care provider before I take this medicine?  They need to know if you have any of these conditions:  -cataracts or glaucoma  -Cushings  -heart disease  -high blood pressure  -infection including tuberculosis  -low calcium or potassium levels in the blood  -recent surgery  -seizures  -stomach or intestinal disease, including colitis  -threadworms  -thyroid problems  -an unusual or allergic reaction to methylprednisolone, corticosteroids, benzyl alcohol, other medicines, foods, dyes, or preservatives  -pregnant or trying to get pregnant  -breast-feeding  How should I use this medicine?  This medicine is for injection into a muscle, joint, or other tissue. It is given by a health care professional in a hospital or clinic setting.  Talk to your pediatrician regarding the use of this medicine in children. While this drug may be prescribed for selected conditions, precautions do apply.  Overdosage: If you think you have taken too much of this medicine contact a poison control  center or emergency room at once.  NOTE: This medicine is only for you. Do not share this medicine with others.  What if I miss a dose?  This does not apply.  What may interact with this medicine?  Do not take this medicine with any of the following medications:  -mifepristone  This medicine may also interact with the following medications:  -aspirin and aspirin-like medicines  -cyclosporin  -ketoconazole  -phenobarbital  -phenytoin  -rifampin  -tacrolimus  -troleandomycin  -vaccines  -warfarin  This list may not describe all possible interactions. Give your health care provider a list of all the medicines, herbs, non-prescription drugs, or dietary supplements you use. Also tell them if you smoke, drink alcohol, or use illegal drugs. Some items may interact with your medicine.  What should I watch for while using this medicine?  Visit your doctor or health care professional for regular checks on your progress. If you are taking this medicine for a long time, carry an identification card with your name and address, the type and dose of your medicine, and your doctor's name and address.  The medicine may increase your risk of getting an infection. Stay away from people who are sick. Tell your doctor or health care professional if you are around anyone with measles or chickenpox.  You may need to avoid some vaccines. Talk to your health care provider for more information.  If you are going to have surgery, tell your doctor or health care professional that you have taken this medicine within the last twelve months.  Ask your doctor or health care professional about your diet. You may need to lower the amount of salt you eat.  The medicine can increase your blood sugar. If you are a diabetic check with your doctor if you need help adjusting the dose of your diabetic medicine.  What side effects may I notice from receiving this medicine?  Side effects that you should report to your doctor or health care professional as  soon as possible:  -allergic reactions like skin rash, itching or hives, swelling of the face, lips, or tongue  -bloody or tarry stools  -changes in vision  -eye pain or bulging eyes  -fever, sore throat, sneezing, cough, or other signs of infection, wounds that will not heal  -increased thirst  -irregular heartbeat  -muscle cramps  -pain in hips, back, ribs, arms, shoulders, or legs  -swelling of the ankles, feet, hands  -trouble passing urine or change in the amount of urine  -unusual bleeding or bruising  -unusually weak or tired  -weight gain or weight loss  Side effects that usually do not require medical attention (report to your doctor or health care professional if they continue or are bothersome):  -changes in emotions or moods  -constipation or diarrhea  -headache  -irritation at site where injected  -nausea, vomiting  -skin problems, acne, thin and shiny skin  -trouble sleeping  -unusual hair growth on the face or body  This list may not describe all possible side effects. Call your doctor for medical advice about side effects. You may report side effects to FDA at 9-921-FDA-1805.  Where should I keep my medicine?  This drug is given in a hospital or clinic and will not be stored at home.  NOTE: This sheet is a summary. It may not cover all possible information. If you have questions about this medicine, talk to your doctor, pharmacist, or health care provider.     © 2016, Elsevier/Gold Standard. (2013-09-17 11:37:56)         To Do List     1/13/2017 3:00 PM     Appointment with MIGDALIA Balderas at [facility] UROLOG[facility] ([phone])   Bring all previous medical records and films, along with current medications and insurance information.   [address] [place] [address]            Your Updated Medication List      ASK your doctor about these medications     azelastine 0.15 % solution nasal spray   Commonly known as:  ASTEPRO   2 sprays into each nostril 2 (Two) Times a Day.        azithromycin 250 MG tablet   Commonly known as:  ZITHROMAX   Take 2 tablets the first day, then 1 tablet daily for 4 days.       busPIRone 15 MG tablet   Commonly known as:  BUSPAR       butalbital-acetaminophen-caffeine -40 MG per tablet   Commonly known as:  FIORICET   Take 1-2 tablets by mouth Every 6 (Six) Hours As Needed for headaches or migraine.       cetirizine 10 MG tablet   Commonly known as:  zyrTEC   Take 1 tablet by mouth Daily for 30 days.       cyclobenzaprine 10 MG tablet   Commonly known as:  FLEXERIL       divalproex 500 MG DR tablet   Commonly known as:  DEPAKOTE       famotidine 20 MG tablet   Commonly known as:  PEPCID       fluticasone 50 MCG/ACT nasal spray   Commonly known as:  FLONASE   1 spray into each nostril 2 (Two) Times a Day for 30 days.       MethylPREDNISolone 4 MG tablet   Commonly known as:  MEDROL (BRET)   Take as directed on package instructions.       montelukast 10 MG tablet   Commonly known as:  SINGULAIR   Take 1 tablet by mouth Every Night for 30 days.       ondansetron 4 MG tablet   Commonly known as:  ZOFRAN   Take 1 tablet by mouth Every 6 (Six) Hours. PRN Nausea/vomiting       * ondansetron ODT 4 MG disintegrating tablet   Commonly known as:  ZOFRAN-ODT   Take 1 tablet by mouth 4 (Four) Times a Day As Needed for nausea or vomiting.       * ondansetron ODT 4 MG disintegrating tablet   Commonly known as:  ZOFRAN ODT   Take 1 tablet by mouth Every 8 (Eight) Hours As Needed for nausea.       oxyCODONE-acetaminophen 5-325 MG per tablet   Commonly known as:  PERCOCET   Take 1 tablet by mouth Every 6 (Six) Hours As Needed for moderate pain (4-6).       pantoprazole 40 MG EC tablet   Commonly known as:  PROTONIX       * promethazine 12.5 MG tablet   Commonly known as:  PHENERGAN   Take 1 tablet by mouth Every 6 (Six) Hours As Needed for nausea or vomiting.       * promethazine 12.5 MG tablet   Commonly known as:  PHENERGAN   Take 1 tablet by mouth Every 6 (Six) Hours  As Needed for nausea or vomiting.       * promethazine 25 MG tablet   Commonly known as:  PHENERGAN       sertraline 100 MG tablet   Commonly known as:  ZOLOFT       SUMAtriptan 6 MG/0.5ML solution injection   Commonly known as:  IMITREX       topiramate 50 MG tablet   Commonly known as:  TOPAMAX       traMADol 50 MG tablet   Commonly known as:  ULTRAM   Take 1 tablet by mouth Every 6 (Six) Hours As Needed for moderate pain (4-6).       * Notice:  This list has 5 medication(s) that are the same as other medications prescribed for you. Read the directions carefully, and ask your doctor or other care provider to review them with you.            [employer] Signup     [facility] C[employer]ric allows you to send messages to your doctor, view your test results, renew your prescriptions, schedule appointments, and more. To sign up, go to [employer] and click on the Sign Up Now link in the New User? box. Enter your [employer] Activation Code exactly as it appears below along with the last four digits of your Social Security Number and your Date of Birth () to complete the sign-up process. If you do not sign up before the expiration date, you must request a new code.    C[employer] Activation Code: [email or web address]  Expires: 2017  5:40 AM    If you have questions, you can email [email or web address] or call [phone] to talk to our [employer] staff. Remember, [employer] is NOT to be used for urgent needs. For medical emergencies, dial 911.               Other Info from Your Visit           Allergies     Ativan [Lorazepam] Hallucinations    confusion    Sulfa Antibiotics Shortness Of Breath, Swelling    Compazine [Prochlorperazine Edisylate] Hives    Demerol [Meperidine] Hives    Droperidol     Prochlorperazine     Toradol [Ketorolac Tromethamine] Hives, Itching      Reason for Visit     Injections DILAUDID/ PHENERGAN/ DEPO-MEDROL FOR MIGRAINE.      Vital Signs     Blood Pressure Pulse Temperature Respirations  "Height Weight    119/79 (BP Location: Right arm, Patient Position: Sitting) 66 98.4 °F (36.9 °C) (Oral) 18 69\" (175.3 cm) 153 lb (69.4 kg)    Last Menstrual Period Body Mass Index Smoking Status             (LMP Unknown) 22.59 kg/m2 Former Smoker         Medications Administered     HYDROmorphone (DILAUDID) injection 1 mg                  methylPREDNISolone acetate (DEPO-medrol) injection 80 mg                  promethazine (PHENERGAN) injection 25 mg                    Discharge Instructions     None         SYMPTOMS OF A STROKE    Call 911 or have someone take you to the Emergency Department if you have any of the following:    · Sudden numbness or weakness of your face, arm or leg especially on one side of the body  · Sudden confusion, diffiiculty speaking or trouble understanding   · Changes in your vision or loss of sight in one eye  · Sudden severe headache with no known cause  · sudden dizziness, trouble walking, loss of balance or coordination    It is important to seek emergency care right away if you have further stroke symptoms. If you get emergency help quickly, the powerful clot-dissolving medicines can reduce the disabilities caused by a stroke.     For more information:    American Stroke Association  0-508-0-STROKE  www.strokeassociation.org           IF YOU SMOKE OR USE TOBACCO PLEASE READ THE FOLLOWING:    Why is smoking bad for me?  Smoking increases the risk of heart disease, lung disease, vascular disease, stroke, and cancer.     If you smoke, STOP!    If you would like more information on quitting smoking, please visit the [employer] website: [email or web address]   This link will provide additional resources including the QUIT line and the Beat the Pack support groups.     For more information:    American Cancer Society  ([phone]    American Heart Association  1-163.812.5981          "

## 2017-01-13 ENCOUNTER — HOSPITAL ENCOUNTER (EMERGENCY)
Facility: HOSPITAL | Age: 42
Discharge: HOME | End: 2017-01-13
Attending: EMERGENCY MEDICINE | Admitting: EMERGENCY MEDICINE

## 2017-01-13 VITALS
WEIGHT: 154 LBS | OXYGEN SATURATION: 99 % | HEART RATE: 75 BPM | BODY MASS INDEX: 22.81 KG/M2 | SYSTOLIC BLOOD PRESSURE: 111 MMHG | RESPIRATION RATE: 15 BRPM | DIASTOLIC BLOOD PRESSURE: 73 MMHG | TEMPERATURE: 98.1 F | HEIGHT: 69 IN

## 2017-01-13 DIAGNOSIS — G43.801 OTHER MIGRAINE WITH STATUS MIGRAINOSUS, NOT INTRACTABLE: Primary | ICD-10-CM

## 2017-01-13 LAB
ALBUMIN SERPL-MCNC: 3.7 G/DL (ref 3.5–5)
ALBUMIN/GLOB SERPL: 1.4 G/DL (ref 1.5–2.5)
ALP SERPL-CCNC: 53 U/L (ref 46–116)
ALT SERPL W P-5'-P-CCNC: 63 U/L (ref 10–36)
ANION GAP SERPL CALCULATED.3IONS-SCNC: 0.1 MMOL/L (ref 3.6–11.2)
AST SERPL-CCNC: 74 U/L (ref 10–30)
BACTERIA UR QL AUTO: ABNORMAL /HPF
BASOPHILS # BLD AUTO: 0 10*3/MM3 (ref 0–0.3)
BASOPHILS NFR BLD AUTO: 0 % (ref 0–2)
BILIRUB SERPL-MCNC: 0.5 MG/DL (ref 0.2–1.8)
BILIRUB UR QL STRIP: NEGATIVE
BUN BLD-MCNC: 11 MG/DL (ref 7–21)
BUN/CREAT SERPL: 18 (ref 7–25)
CALCIUM SPEC-SCNC: 8.5 MG/DL (ref 7.7–10)
CHLORIDE SERPL-SCNC: 113 MMOL/L (ref 99–112)
CLARITY UR: CLEAR
CO2 SERPL-SCNC: 31.9 MMOL/L (ref 24.3–31.9)
COLOR UR: YELLOW
CREAT BLD-MCNC: 0.61 MG/DL (ref 0.43–1.29)
DEPRECATED RDW RBC AUTO: 48.1 FL (ref 37–54)
EOSINOPHIL # BLD AUTO: 0.04 10*3/MM3 (ref 0–0.7)
EOSINOPHIL NFR BLD AUTO: 1 % (ref 0–5)
ERYTHROCYTE [DISTWIDTH] IN BLOOD BY AUTOMATED COUNT: 14.1 % (ref 11.5–14.5)
GFR SERPL CREATININE-BSD FRML MDRD: 108 ML/MIN/1.73
GLOBULIN UR ELPH-MCNC: 2.7 GM/DL
GLUCOSE BLD-MCNC: 120 MG/DL (ref 70–110)
GLUCOSE UR STRIP-MCNC: NEGATIVE MG/DL
HCT VFR BLD AUTO: 37.9 % (ref 37–47)
HGB BLD-MCNC: 12.6 G/DL (ref 12–16)
HGB UR QL STRIP.AUTO: NEGATIVE
HYALINE CASTS UR QL AUTO: ABNORMAL /LPF
IMM GRANULOCYTES # BLD: 0.01 10*3/MM3 (ref 0–0.03)
IMM GRANULOCYTES NFR BLD: 0.3 % (ref 0–0.5)
KETONES UR QL STRIP: NEGATIVE
LEUKOCYTE ESTERASE UR QL STRIP.AUTO: ABNORMAL
LYMPHOCYTES # BLD AUTO: 1.19 10*3/MM3 (ref 1–3)
LYMPHOCYTES NFR BLD AUTO: 30.7 % (ref 21–51)
MCH RBC QN AUTO: 31 PG (ref 27–33)
MCHC RBC AUTO-ENTMCNC: 33.2 G/DL (ref 33–37)
MCV RBC AUTO: 93.3 FL (ref 80–94)
MONOCYTES # BLD AUTO: 0.21 10*3/MM3 (ref 0.1–0.9)
MONOCYTES NFR BLD AUTO: 5.4 % (ref 0–10)
NEUTROPHILS # BLD AUTO: 2.42 10*3/MM3 (ref 1.4–6.5)
NEUTROPHILS NFR BLD AUTO: 62.6 % (ref 30–70)
NITRITE UR QL STRIP: NEGATIVE
NRBC BLD MANUAL-RTO: 0 /100 WBC (ref 0–0)
OSMOLALITY SERPL CALC.SUM OF ELEC: 289.3 MOSM/KG (ref 273–305)
PH UR STRIP.AUTO: 7 [PH] (ref 5–8)
PLATELET # BLD AUTO: 78 10*3/MM3 (ref 130–400)
PMV BLD AUTO: 11 FL (ref 6–10)
POTASSIUM BLD-SCNC: 4.2 MMOL/L (ref 3.5–5.3)
PROT SERPL-MCNC: 6.4 G/DL (ref 6–8)
PROT UR QL STRIP: NEGATIVE
RBC # BLD AUTO: 4.06 10*6/MM3 (ref 4.2–5.4)
RBC # UR: ABNORMAL /HPF
REF LAB TEST METHOD: ABNORMAL
SODIUM BLD-SCNC: 145 MMOL/L (ref 135–153)
SP GR UR STRIP: 1.02 (ref 1–1.03)
SQUAMOUS #/AREA URNS HPF: ABNORMAL /HPF
UROBILINOGEN UR QL STRIP: ABNORMAL
VALPROATE SERPL-MCNC: 21.5 MCG/ML (ref 50–100)
WBC NRBC COR # BLD: 3.87 10*3/MM3 (ref 4.5–12.5)
WBC UR QL AUTO: ABNORMAL /HPF

## 2017-01-13 RX ORDER — PROMETHAZINE HYDROCHLORIDE 25 MG/ML
12.5 INJECTION, SOLUTION INTRAMUSCULAR; INTRAVENOUS ONCE
Status: COMPLETED | OUTPATIENT
Start: 2017-01-13 | End: 2017-01-13

## 2017-01-13 RX ORDER — ONDANSETRON 2 MG/ML
4 INJECTION INTRAMUSCULAR; INTRAVENOUS ONCE
Status: COMPLETED | OUTPATIENT
Start: 2017-01-13 | End: 2017-01-13

## 2017-01-13 RX ORDER — DEXAMETHASONE SODIUM PHOSPHATE 4 MG/ML
10 INJECTION, SOLUTION INTRA-ARTICULAR; INTRALESIONAL; INTRAMUSCULAR; INTRAVENOUS; SOFT TISSUE ONCE
Status: COMPLETED | OUTPATIENT
Start: 2017-01-13 | End: 2017-01-13

## 2017-01-13 RX ORDER — SODIUM CHLORIDE 0.9 % (FLUSH) 0.9 %
10 SYRINGE (ML) INJECTION AS NEEDED
Status: DISCONTINUED | OUTPATIENT
Start: 2017-01-13 | End: 2017-01-13 | Stop reason: HOSPADM

## 2017-01-13 RX ADMIN — PROMETHAZINE HYDROCHLORIDE 12.5 MG: 25 INJECTION INTRAMUSCULAR; INTRAVENOUS at 08:48

## 2017-01-13 RX ADMIN — DEXAMETHASONE SODIUM PHOSPHATE 10 MG: 4 INJECTION, SOLUTION INTRAMUSCULAR; INTRAVENOUS at 08:49

## 2017-01-13 RX ADMIN — ONDANSETRON 4 MG: 2 INJECTION, SOLUTION INTRAMUSCULAR; INTRAVENOUS at 10:43

## 2017-01-13 RX ADMIN — HYDROMORPHONE HYDROCHLORIDE 1 MG: 1 INJECTION, SOLUTION INTRAMUSCULAR; INTRAVENOUS; SUBCUTANEOUS at 08:48

## 2017-01-13 RX ADMIN — SODIUM CHLORIDE 1000 ML: 9 INJECTION, SOLUTION INTRAVENOUS at 08:47

## 2017-01-13 RX ADMIN — HYDROMORPHONE HYDROCHLORIDE 1 MG: 1 INJECTION, SOLUTION INTRAMUSCULAR; INTRAVENOUS; SUBCUTANEOUS at 10:42

## 2017-01-13 NOTE — ED NOTES
Patient reports pain is now a 4 on a 0-10 pain scale after pain medicaiton. Pt denies any other needs at this time. Pt in NAD. Call light within reach and family at bedside     Lili Ambrocio RN  01/13/17 [id number]

## 2017-01-13 NOTE — ED PROVIDER NOTES
Subjective   HPI Comments: 41-year-old female who presents today with a migraine headache.  She states she has had a headache for the last 3 days.  She states she takes Imitrex, Topamax and Depakote at home with no relief.  She states she has also had one session of Botox with no relief.  She states she is also on Fioricet and tramadol as well as Stadol nasal spray at home and that did not seem to help either.  She states she has been having nausea and vomiting.  She takes Zofran at home and that has not helped.  She states typically when she gets a migraine she has to be admitted to the hospital to receive DHE however there is a national shortage and she has not been able to receive it recently.  She states typically if she receives IV fluids as well as pain medication, Phenergan and Decadron that does help to relieve her symptoms.  Patient denies any fever or nuchal rigidity.  She states this headache feels similar to her past headaches.    Patient is a 41 y.o. female presenting with headaches.   History provided by:  Patient  Headache   Pain location:  Generalized  Quality:  Dull  Radiates to:  Does not radiate  Severity currently:  10/10  Severity at highest:  10/10  Onset quality:  Gradual  Duration:  3 days  Timing:  Constant  Progression:  Worsening  Chronicity:  Recurrent  Similar to prior headaches: yes    Context: bright light and loud noise    Relieved by:  Nothing  Worsened by:  Light and sound  Ineffective treatments:  Prescription medications  Associated symptoms: nausea, photophobia and vomiting    Associated symptoms: no abdominal pain, no back pain, no blurred vision, no congestion, no cough, no diarrhea, no dizziness, no drainage, no ear pain, no eye pain, no facial pain, no fatigue, no fever, no focal weakness, no hearing loss, no loss of balance, no myalgias, no near-syncope, no neck pain, no neck stiffness, no numbness, no paresthesias, no seizures, no sinus pressure, no sore throat, no swollen  glands, no syncope, no tingling, no URI, no visual change and no weakness        Review of Systems   Constitutional: Negative for fatigue and fever.   HENT: Negative for congestion, ear pain, hearing loss, postnasal drip, sinus pressure and sore throat.    Eyes: Positive for photophobia. Negative for blurred vision and pain.   Respiratory: Negative for cough.    Cardiovascular: Negative.  Negative for syncope and near-syncope.   Gastrointestinal: Positive for nausea and vomiting. Negative for abdominal pain and diarrhea.   Genitourinary: Negative.    Musculoskeletal: Negative for back pain, myalgias, neck pain and neck stiffness.   Skin: Negative.    Neurological: Positive for headaches. Negative for dizziness, focal weakness, seizures, weakness, numbness, paresthesias and loss of balance.   Psychiatric/Behavioral: Negative.    All other systems reviewed and are negative.      Past Medical History   Diagnosis Date   • Abdominal pain    • Abdominal swelling    • Bipolar 1 disorder    • Brain tumor      R Frontal Lobe per pt   • Constipation    • Diarrhea    • Fibromyalgia    • IBS (irritable bowel syndrome)    • Migraine    • Nausea & vomiting    • PONV (postoperative nausea and vomiting)    • PTSD (post-traumatic stress disorder)    • Rectal bleeding        Allergies   Allergen Reactions   • Ativan [Lorazepam] Hallucinations     confusion   • Sulfa Antibiotics Shortness Of Breath and Swelling   • Compazine [Prochlorperazine Edisylate] Hives   • Demerol [Meperidine] Hives   • Droperidol    • Prochlorperazine    • Toradol [Ketorolac Tromethamine] Hives and Itching       Past Surgical History   Procedure Laterality Date   • Hysterectomy     • Appendectomy     • Shoulder surgery       3 times   • Knee surgery     • Endoscopy N/A 6/30/2016     Procedure: ESOPHAGOGASTRODUODENOSCOPY WITH BIOPSY  CPTCODE:68361;  Surgeon: Jose Antonio Belle III, MD;  Location: [facility];  Service:    • Colonoscopy N/A 6/30/2016      Procedure: COLONOSCOPY  CPTCODE:92541;  Surgeon: Jose Antonio Belle III, MD;  Location: [facility] OR;  Service:    • Colonoscopy N/A 7/7/2016     Procedure: COLONOSCOPY (09193) CPT;  Surgeon: Jose Antonio Belle III, MD;  Location: [facility] OR;  Service:    • Hemorrhoidectomy N/A 7/28/2016     Procedure: HEMORRHOID STAPLING;  Surgeon: Kael Lopez MD;  Location: [facility] OR;  Service:    • Anal scope N/A 7/28/2016     Procedure: ANAL SCOPE;  Surgeon: Kael Lopez MD;  Location: [facility] OR;  Service:        Family History   Problem Relation Age of Onset   • Crohn's disease Other    • Hypertension Other    • Diabetes Other    • Irritable bowel syndrome Other        Social History     Social History   • Marital status:      Spouse name: N/A   • Number of children: N/A   • Years of education: N/A     Social History Main Topics   • Smoking status: Former Smoker     Types: Cigarettes     Quit date: 11/1/2015   • Smokeless tobacco: Never Used   • Alcohol use No   • Drug use: Yes     Special: Marijuana   • Sexual activity: Defer     Other Topics Concern   • None     Social History Narrative           Objective   Physical Exam   Constitutional: She is oriented to person, place, and time. She appears well-developed and well-nourished. No distress.   Laying in a dark room   HENT:   Head: Normocephalic and atraumatic.   Right Ear: External ear normal.   Left Ear: External ear normal.   Nose: Nose normal.   Mouth/Throat: Oropharynx is clear and moist.   Eyes: Conjunctivae and EOM are normal. Pupils are equal, round, and reactive to light.   Neck: Normal range of motion and full passive range of motion without pain. No rigidity.   Cardiovascular: Normal rate, regular rhythm, normal heart sounds and intact distal pulses.    Pulmonary/Chest: Effort normal and breath sounds normal. No respiratory distress.   Abdominal: Soft. Bowel sounds are normal. She exhibits no distension. There is no tenderness. There is no rebound and  no guarding.   Musculoskeletal: Normal range of motion.   Neurological: She is alert and oriented to person, place, and time. No cranial nerve deficit. She exhibits normal muscle tone. Coordination normal.   Skin: Skin is warm and dry.   Psychiatric: She has a normal mood and affect. Her behavior is normal. Judgment and thought content normal.   Nursing note and vitals reviewed.      Procedures         ED Course  ED Course   Comment By Time   Pt is requesting more pain and nausea medicine and is requesting a meal tray as well. TAMI Maldonado 01/13 1035   Pt states she is feeling better and her headache is now a 4/10.  She states Dr. Horton is referring her to the headache clinic in [place] for further management of her migraines. TAMI Maldonado 01/13 1133                  MDM  Number of Diagnoses or Management Options  Other migraine with status migrainosus, not intractable:      Amount and/or Complexity of Data Reviewed  Clinical lab tests: reviewed and ordered    Patient Progress  Patient progress: improved      Final diagnoses:   Other migraine with status migrainosus, not intractable            TAMI Maldonado  01/13/17 1155

## 2017-01-14 ENCOUNTER — HOSPITAL ENCOUNTER (INPATIENT)
Facility: HOSPITAL | Age: 42
LOS: 3 days | Discharge: HOME | End: 2017-01-17
Attending: EMERGENCY MEDICINE | Admitting: PSYCHIATRY & NEUROLOGY

## 2017-01-14 ENCOUNTER — APPOINTMENT (OUTPATIENT)
Dept: CT IMAGING | Facility: HOSPITAL | Age: 42
End: 2017-01-14

## 2017-01-14 DIAGNOSIS — G43.711 CHRONIC MIGRAINE WITHOUT AURA, INTRACTABLE, WITH STATUS MIGRAINOSUS: Primary | ICD-10-CM

## 2017-01-14 LAB
ALBUMIN SERPL-MCNC: 4.3 G/DL (ref 3.5–5)
ALBUMIN/GLOB SERPL: 1.5 G/DL (ref 1.5–2.5)
ALP SERPL-CCNC: 57 U/L (ref 46–116)
ALT SERPL W P-5'-P-CCNC: 57 U/L (ref 10–36)
ANION GAP SERPL CALCULATED.3IONS-SCNC: 9.5 MMOL/L (ref 3.6–11.2)
AST SERPL-CCNC: 55 U/L (ref 10–30)
BASOPHILS # BLD AUTO: 0.01 10*3/MM3 (ref 0–0.3)
BASOPHILS NFR BLD AUTO: 0.2 % (ref 0–2)
BILIRUB SERPL-MCNC: 0.5 MG/DL (ref 0.2–1.8)
BUN BLD-MCNC: 14 MG/DL (ref 7–21)
BUN/CREAT SERPL: 21.9 (ref 7–25)
CALCIUM SPEC-SCNC: 9.1 MG/DL (ref 7.7–10)
CHLORIDE SERPL-SCNC: 105 MMOL/L (ref 99–112)
CO2 SERPL-SCNC: 31.5 MMOL/L (ref 24.3–31.9)
CREAT BLD-MCNC: 0.64 MG/DL (ref 0.43–1.29)
DEPRECATED RDW RBC AUTO: 48.8 FL (ref 37–54)
EOSINOPHIL # BLD AUTO: 0.08 10*3/MM3 (ref 0–0.7)
EOSINOPHIL NFR BLD AUTO: 1.4 % (ref 0–5)
ERYTHROCYTE [DISTWIDTH] IN BLOOD BY AUTOMATED COUNT: 14.2 % (ref 11.5–14.5)
GFR SERPL CREATININE-BSD FRML MDRD: 102 ML/MIN/1.73
GLOBULIN UR ELPH-MCNC: 2.8 GM/DL
GLUCOSE BLD-MCNC: 76 MG/DL (ref 70–110)
HCT VFR BLD AUTO: 39.8 % (ref 37–47)
HGB BLD-MCNC: 13.3 G/DL (ref 12–16)
IMM GRANULOCYTES # BLD: 0.02 10*3/MM3 (ref 0–0.03)
IMM GRANULOCYTES NFR BLD: 0.4 % (ref 0–0.5)
LYMPHOCYTES # BLD AUTO: 2.83 10*3/MM3 (ref 1–3)
LYMPHOCYTES NFR BLD AUTO: 50.7 % (ref 21–51)
MCH RBC QN AUTO: 31.8 PG (ref 27–33)
MCHC RBC AUTO-ENTMCNC: 33.4 G/DL (ref 33–37)
MCV RBC AUTO: 95.2 FL (ref 80–94)
MONOCYTES # BLD AUTO: 0.46 10*3/MM3 (ref 0.1–0.9)
MONOCYTES NFR BLD AUTO: 8.2 % (ref 0–10)
NEUTROPHILS # BLD AUTO: 2.18 10*3/MM3 (ref 1.4–6.5)
NEUTROPHILS NFR BLD AUTO: 39.1 % (ref 30–70)
OSMOLALITY SERPL CALC.SUM OF ELEC: 289.8 MOSM/KG (ref 273–305)
PLATELET # BLD AUTO: 98 10*3/MM3 (ref 130–400)
PMV BLD AUTO: 10.6 FL (ref 6–10)
POTASSIUM BLD-SCNC: 3.9 MMOL/L (ref 3.5–5.3)
PROT SERPL-MCNC: 7.1 G/DL (ref 6–8)
RBC # BLD AUTO: 4.18 10*6/MM3 (ref 4.2–5.4)
SODIUM BLD-SCNC: 146 MMOL/L (ref 135–153)
WBC NRBC COR # BLD: 5.58 10*3/MM3 (ref 4.5–12.5)

## 2017-01-14 PROCEDURE — 70450 CT HEAD/BRAIN W/O DYE: CPT | Performed by: RADIOLOGY

## 2017-01-14 PROCEDURE — 70450 CT HEAD/BRAIN W/O DYE: CPT

## 2017-01-14 PROCEDURE — 85025 COMPLETE CBC W/AUTO DIFF WBC: CPT | Performed by: EMERGENCY MEDICINE

## 2017-01-14 PROCEDURE — 80053 COMPREHEN METABOLIC PANEL: CPT | Performed by: EMERGENCY MEDICINE

## 2017-01-14 PROCEDURE — 25010000002 HYDROMORPHONE PER 4 MG: Performed by: EMERGENCY MEDICINE

## 2017-01-14 PROCEDURE — 99283 EMERGENCY DEPT VISIT LOW MDM: CPT

## 2017-01-14 PROCEDURE — 25010000002 PROMETHAZINE PER 50 MG: Performed by: EMERGENCY MEDICINE

## 2017-01-14 PROCEDURE — 25810000003 DEXTROSE-NACL PER 500 ML: Performed by: PSYCHIATRY & NEUROLOGY

## 2017-01-14 PROCEDURE — 25010000002 DIHYDROERGOTAMINE MESYLATE PER 1 MG: Performed by: EMERGENCY MEDICINE

## 2017-01-14 PROCEDURE — 25010000002 DIHYDROERGOTAMINE MESYLATE PER 1 MG: Performed by: PSYCHIATRY & NEUROLOGY

## 2017-01-14 RX ORDER — BUTALBITAL, ACETAMINOPHEN AND CAFFEINE 50; 325; 40 MG/1; MG/1; MG/1
1 TABLET ORAL EVERY 6 HOURS PRN
Status: DISCONTINUED | OUTPATIENT
Start: 2017-01-14 | End: 2017-01-17 | Stop reason: HOSPADM

## 2017-01-14 RX ORDER — BUTORPHANOL TARTRATE 10 MG/ML
1 SPRAY, METERED NASAL EVERY 4 HOURS PRN
Status: ON HOLD | COMMUNITY
End: 2017-01-14

## 2017-01-14 RX ORDER — MONTELUKAST SODIUM 10 MG/1
10 TABLET ORAL NIGHTLY
Status: DISCONTINUED | OUTPATIENT
Start: 2017-01-14 | End: 2017-01-17 | Stop reason: HOSPADM

## 2017-01-14 RX ORDER — DEXTROSE AND SODIUM CHLORIDE 5; .9 G/100ML; G/100ML
100 INJECTION, SOLUTION INTRAVENOUS CONTINUOUS
Status: DISCONTINUED | OUTPATIENT
Start: 2017-01-14 | End: 2017-01-17 | Stop reason: HOSPADM

## 2017-01-14 RX ORDER — DIHYDROERGOTAMINE MESYLATE 1 MG/ML
0.5 INJECTION, SOLUTION INTRAMUSCULAR; INTRAVENOUS; SUBCUTANEOUS EVERY 8 HOURS SCHEDULED
Status: COMPLETED | OUTPATIENT
Start: 2017-01-14 | End: 2017-01-17

## 2017-01-14 RX ORDER — TEMAZEPAM 15 MG/1
30 CAPSULE ORAL ONCE
Status: COMPLETED | OUTPATIENT
Start: 2017-01-14 | End: 2017-01-14

## 2017-01-14 RX ORDER — SUMATRIPTAN 6 MG/.5ML
6 INJECTION, SOLUTION SUBCUTANEOUS ONCE AS NEEDED
Status: CANCELLED | OUTPATIENT
Start: 2017-01-14

## 2017-01-14 RX ORDER — FAMOTIDINE 40 MG/1
40 TABLET, FILM COATED ORAL 2 TIMES DAILY PRN
COMMUNITY
End: 2018-03-22 | Stop reason: HOSPADM

## 2017-01-14 RX ORDER — SODIUM CHLORIDE 0.9 % (FLUSH) 0.9 %
10 SYRINGE (ML) INJECTION AS NEEDED
Status: DISCONTINUED | OUTPATIENT
Start: 2017-01-14 | End: 2017-01-17 | Stop reason: HOSPADM

## 2017-01-14 RX ORDER — TOPIRAMATE 25 MG/1
25 TABLET ORAL EVERY 12 HOURS SCHEDULED
Status: DISCONTINUED | OUTPATIENT
Start: 2017-01-14 | End: 2017-01-16

## 2017-01-14 RX ORDER — PROMETHAZINE HYDROCHLORIDE 25 MG/1
25 TABLET ORAL 2 TIMES DAILY PRN
Status: DISCONTINUED | OUTPATIENT
Start: 2017-01-14 | End: 2017-01-15

## 2017-01-14 RX ORDER — FAMOTIDINE 20 MG/1
40 TABLET, FILM COATED ORAL 2 TIMES DAILY PRN
Status: DISCONTINUED | OUTPATIENT
Start: 2017-01-14 | End: 2017-01-17 | Stop reason: HOSPADM

## 2017-01-14 RX ORDER — CETIRIZINE HYDROCHLORIDE 10 MG/1
10 TABLET ORAL DAILY
Status: DISCONTINUED | OUTPATIENT
Start: 2017-01-14 | End: 2017-01-14

## 2017-01-14 RX ORDER — FLUTICASONE PROPIONATE 50 MCG
1 SPRAY, SUSPENSION (ML) NASAL 2 TIMES DAILY
Status: DISCONTINUED | OUTPATIENT
Start: 2017-01-14 | End: 2017-01-17 | Stop reason: HOSPADM

## 2017-01-14 RX ORDER — TOPIRAMATE 25 MG/1
25 TABLET ORAL 2 TIMES DAILY
COMMUNITY
End: 2017-01-17 | Stop reason: HOSPADM

## 2017-01-14 RX ORDER — CETIRIZINE HYDROCHLORIDE 10 MG/1
10 TABLET ORAL DAILY
Status: DISCONTINUED | OUTPATIENT
Start: 2017-01-14 | End: 2017-01-17 | Stop reason: HOSPADM

## 2017-01-14 RX ORDER — PANTOPRAZOLE SODIUM 40 MG/1
40 TABLET, DELAYED RELEASE ORAL DAILY PRN
Status: DISCONTINUED | OUTPATIENT
Start: 2017-01-14 | End: 2017-01-17 | Stop reason: HOSPADM

## 2017-01-14 RX ORDER — METOCLOPRAMIDE HYDROCHLORIDE 5 MG/ML
10 INJECTION INTRAMUSCULAR; INTRAVENOUS EVERY 8 HOURS
Status: DISCONTINUED | OUTPATIENT
Start: 2017-01-14 | End: 2017-01-17

## 2017-01-14 RX ORDER — DIHYDROERGOTAMINE MESYLATE 1 MG/ML
1 INJECTION, SOLUTION INTRAMUSCULAR; INTRAVENOUS; SUBCUTANEOUS ONCE
Status: COMPLETED | OUTPATIENT
Start: 2017-01-14 | End: 2017-01-14

## 2017-01-14 RX ORDER — DIVALPROEX SODIUM 500 MG/1
500 TABLET, DELAYED RELEASE ORAL EVERY 12 HOURS SCHEDULED
Status: DISCONTINUED | OUTPATIENT
Start: 2017-01-14 | End: 2017-01-16

## 2017-01-14 RX ADMIN — SERTRALINE HYDROCHLORIDE 150 MG: 50 TABLET, FILM COATED ORAL at 15:32

## 2017-01-14 RX ADMIN — CARBIDOPA AND LEVODOPA 1 TABLET: 10; 100 TABLET ORAL at 21:30

## 2017-01-14 RX ADMIN — DIHYDROERGOTAMINE MESYLATE 1 MG: 1 INJECTION, SOLUTION INTRAMUSCULAR; INTRAVENOUS; SUBCUTANEOUS at 12:17

## 2017-01-14 RX ADMIN — CETIRIZINE HYDROCHLORIDE 10 MG: 10 TABLET ORAL at 15:31

## 2017-01-14 RX ADMIN — DIVALPROEX SODIUM 500 MG: 500 TABLET, DELAYED RELEASE ORAL at 21:30

## 2017-01-14 RX ADMIN — CARBIDOPA AND LEVODOPA 1 TABLET: 10; 100 TABLET ORAL at 15:31

## 2017-01-14 RX ADMIN — BUTALBITAL, ACETAMINOPHEN, AND CAFFEINE 1 TABLET: 50; 325; 40 TABLET ORAL at 14:19

## 2017-01-14 RX ADMIN — BUSPIRONE HYDROCHLORIDE 15 MG: 10 TABLET ORAL at 16:00

## 2017-01-14 RX ADMIN — TOPIRAMATE 25 MG: 25 TABLET, FILM COATED ORAL at 15:34

## 2017-01-14 RX ADMIN — TEMAZEPAM 30 MG: 15 CAPSULE ORAL at 21:32

## 2017-01-14 RX ADMIN — PROMETHAZINE HYDROCHLORIDE 12.5 MG: 25 INJECTION INTRAMUSCULAR; INTRAVENOUS at 10:10

## 2017-01-14 RX ADMIN — DIHYDROERGOTAMINE MESYLATE 0.5 MG: 1 INJECTION, SOLUTION INTRAMUSCULAR; INTRAVENOUS; SUBCUTANEOUS at 21:31

## 2017-01-14 RX ADMIN — DEXTROSE AND SODIUM CHLORIDE 100 ML/HR: 5; 900 INJECTION, SOLUTION INTRAVENOUS at 23:52

## 2017-01-14 RX ADMIN — DIVALPROEX SODIUM 500 MG: 500 TABLET, DELAYED RELEASE ORAL at 15:36

## 2017-01-14 RX ADMIN — DEXTROSE AND SODIUM CHLORIDE 100 ML/HR: 5; 900 INJECTION, SOLUTION INTRAVENOUS at 14:19

## 2017-01-14 RX ADMIN — FLUTICASONE PROPIONATE 1 SPRAY: 50 SPRAY, METERED NASAL at 19:29

## 2017-01-14 RX ADMIN — TOPIRAMATE 25 MG: 25 TABLET, FILM COATED ORAL at 21:30

## 2017-01-14 RX ADMIN — MONTELUKAST SODIUM 10 MG: 10 TABLET, COATED ORAL at 21:30

## 2017-01-14 RX ADMIN — HYDROMORPHONE HYDROCHLORIDE 1 MG: 1 INJECTION, SOLUTION INTRAMUSCULAR; INTRAVENOUS; SUBCUTANEOUS at 10:11

## 2017-01-14 RX ADMIN — BUSPIRONE HYDROCHLORIDE 15 MG: 10 TABLET ORAL at 21:29

## 2017-01-14 RX ADMIN — SODIUM CHLORIDE 1000 ML: 9 INJECTION, SOLUTION INTRAVENOUS at 10:10

## 2017-01-14 RX ADMIN — HYDROMORPHONE HYDROCHLORIDE 1 MG: 1 INJECTION, SOLUTION INTRAMUSCULAR; INTRAVENOUS; SUBCUTANEOUS at 11:15

## 2017-01-14 NOTE — NURSING NOTE
Patient is complaining of a headache stating her pain is at a level 7 on a scale of 0 to 10. 10 being the worst pain. MD. Dr. Horton notified that the patient continues to have pain at this level and she would like to have some additional pain medication to assist with her pain. Dr. Horton states he admitted the patient so she could receive the DHE and he would like her to continue with comfort measures until the DHE has had time to work. Patient received her initial does in the ER and has been scheduled to receive the mediation as prescribed. Patient notified of Dr. hCawla decision and states she is upset that he will not give her additional pain meds such as dilaudid or stadol. Instructed patient to let me know if she needs anything to ring out to the nurses station.

## 2017-01-14 NOTE — IP AVS SNAPSHOT
AFTER VISIT SUMMARY             Susanna Camilo           About your hospitalization     You were admitted on:  January 14, 2017 You last received care in the:  [address]       Procedures & Surgeries         Medications    If you or your caregiver advised us that you are currently taking a medication and that medication is marked below as “Resume”, this simply indicates that we have reviewed those medications to make sure our new therapy recommendations do not interfere.  If you have concerns about medications other than those new ones which we are prescribing today, please consult the physician who prescribed them (or your primary physician).  Our review of your home medications is not meant to indicate that we are directing their use.             Your Medications      CONTINUE taking these medications     azelastine 0.15 % solution nasal spray   2 sprays into each nostril 2 (Two) Times a Day.   Commonly known as:  ASTEPRO           busPIRone 15 MG tablet   Take 15 mg by mouth 3 (three) times a day   Last time this was given:  1/17/2017  5:11 PM   Commonly known as:  BUSPAR           butalbital-acetaminophen-caffeine -40 MG per tablet   Take 1-2 tablets by mouth Every 6 (Six) Hours As Needed for headaches or migraine.   Last time this was given:  1/16/2017  5:06 PM   Commonly known as:  FIORICET           carbidopa-levodopa  MG per tablet   Take 1 tablet by mouth 3 (Three) Times a Day.   Last time this was given:  1/17/2017  5:11 PM   Commonly known as:  SINEMET           cetirizine 10 MG tablet   Take 1 tablet by mouth Daily for 30 days.   Last time this was given:  1/17/2017  8:58 AM   Commonly known as:  zyrTEC           divalproex 500 MG DR tablet   Take 500 mg by mouth 2 (Two) Times a Day.   Last time this was given:  1/15/2017 10:03 PM   Commonly known as:  DEPAKOTE           famotidine 40 MG tablet   Take 40 mg by mouth 2 (Two) Times a Day As Needed for heartburn.   Commonly  known as:  PEPCID           fluticasone 50 MCG/ACT nasal spray   1 spray into each nostril 2 (Two) Times a Day for 30 days.   Last time this was given:  1/17/2017  5:13 PM   Commonly known as:  FLONASE           montelukast 10 MG tablet   Take 1 tablet by mouth Every Night for 30 days.   Last time this was given:  1/16/2017  9:22 PM   Commonly known as:  SINGULAIR           pantoprazole 40 MG EC tablet   Take 40 mg by mouth Daily As Needed.   Commonly known as:  PROTONIX           sertraline 100 MG tablet   Take 150 mg by mouth daily.   Last time this was given:  1/17/2017  8:59 AM   Commonly known as:  ZOLOFT             STOP taking these medications     promethazine 25 MG tablet   Commonly known as:  PHENERGAN           SUMAtriptan 6 MG/0.5ML solution injection   Commonly known as:  IMITREX           topiramate 25 MG tablet   Commonly known as:  TOPAMAX                      Your Medications      Your Medication List           Morning Noon Evening Bedtime As Needed    azelastine 0.15 % solution nasal spray   2 sprays into each nostril 2 (Two) Times a Day.   Commonly known as:  ASTEPRO                                busPIRone 15 MG tablet   Take 15 mg by mouth 3 (three) times a day   Commonly known as:  BUSPAR                                butalbital-acetaminophen-caffeine -40 MG per tablet   Take 1-2 tablets by mouth Every 6 (Six) Hours As Needed for headaches or migraine.   Commonly known as:  FIORICET                                carbidopa-levodopa  MG per tablet   Take 1 tablet by mouth 3 (Three) Times a Day.   Commonly known as:  SINEMET                                cetirizine 10 MG tablet   Take 1 tablet by mouth Daily for 30 days.   Commonly known as:  zyrTEC                                divalproex 500 MG DR tablet   Take 500 mg by mouth 2 (Two) Times a Day.   Commonly known as:  DEPAKOTE                                famotidine 40 MG tablet   Take 40 mg by mouth 2 (Two) Times a Day As  Needed for heartburn.   Commonly known as:  PEPCID                                fluticasone 50 MCG/ACT nasal spray   1 spray into each nostril 2 (Two) Times a Day for 30 days.   Commonly known as:  FLONASE                                montelukast 10 MG tablet   Take 1 tablet by mouth Every Night for 30 days.   Commonly known as:  SINGULAIR                                pantoprazole 40 MG EC tablet   Take 40 mg by mouth Daily As Needed.   Commonly known as:  PROTONIX                                sertraline 100 MG tablet   Take 150 mg by mouth daily.   Commonly known as:  ZOLOFT                                         Instructions for After Discharge        Activity Instructions     Activity as tolerated.            Diet Instructions     Regular diet as tolerated.            Discharge References/Attachments     MIGRAINE HEADACHE (ENGLISH)       Follow-ups for After Discharge        Follow-up Information     Follow up with MIGDALIA Gooden Follow up in 1 week(s).    Specialty:  Family Medicine    Contact information:    [address] [facility] [place] [id number] [phone]        Notice Kiosk Signup     [facility] allows you to send messages to your doctor, view your test results, renew your prescriptions, schedule appointments, and more. To sign up, go to [employer] and click on the Sign Up Now link in the New User? box. Enter your Notice Kiosk Activation Code exactly as it appears below along with the last four digits of your Social Security Number and your Date of Birth () to complete the sign-up process. If you do not sign up before the expiration date, you must request a new code.    Notice Kiosk Activation Code: [email or web address]  Expires: 2017  5:40 AM    If you have questions, you can email [email or web address] or call [phone] to talk to our Notice Kiosk staff. Remember, Notice Kiosk is NOT to be used for urgent needs. For medical emergencies, dial 911.            Summary of Your Hospitalization        Reason for Hospitalization     Your primary diagnosis was:  Not on File    Your diagnoses also included:  Migraines, Migraines      Care Providers     Provider Service Role Specialty    Casper Horton MD Neurology Attending Provider Neurology    Casper Horton MD Neurology Consulting Physician  Neurology    Brett Coates MD Medicine Consulting Physician  Internal Medicine      Your Allergies  Date Reviewed: 1/14/2017    Allergen Reactions    Ativan (Lorazepam) Hallucinations    confusion         Sulfa Antibiotics Shortness Of Breath  Swelling         Compazine (Prochlorperazine Edisylate) Hives         Demerol (Meperidine) Hives         Droperidol Not Noted         Prochlorperazine Not Noted         Toradol (Ketorolac Tromethamine) Hives  Itching      Patient Belongings Returned     Document Return of Belongings Flowsheet     Were the patient bedside belongings sent home?   Yes   Belongings Retrieved from Security & Sent Home   N/A    Belongings Sent to Safe   --   Medications Retrieved from Pharmacy & Sent Home   N/A              More Information      Migraine Headache  A migraine headache is an intense, throbbing pain on one or both sides of your head. A migraine can last for 30 minutes to several hours.  CAUSES   The exact cause of a migraine headache is not always known. However, a migraine may be caused when nerves in the brain become irritated and release chemicals that cause inflammation. This causes pain.  Certain things may also trigger migraines, such as:  · Alcohol.  · Smoking.  · Stress.  · Menstruation.  · Aged cheeses.  · Foods or drinks that contain nitrates, glutamate, aspartame, or tyramine.  · Lack of sleep.  · Chocolate.  · Caffeine.  · Hunger.  · Physical exertion.  · Fatigue.  · Medicines used to treat chest pain (nitroglycerine), birth control pills, estrogen, and some blood pressure medicines.  SIGNS AND SYMPTOMS  · Pain on one or both sides of your  head.  · Pulsating or throbbing pain.  · Severe pain that prevents daily activities.  · Pain that is aggravated by any physical activity.  · Nausea, vomiting, or both.  · Dizziness.  · Pain with exposure to bright lights, loud noises, or activity.  · General sensitivity to bright lights, loud noises, or smells.  Before you get a migraine, you may get warning signs that a migraine is coming (aura). An aura may include:  · Seeing flashing lights.  · Seeing bright spots, halos, or zigzag lines.  · Having tunnel vision or blurred vision.  · Having feelings of numbness or tingling.  · Having trouble talking.  · Having muscle weakness.  DIAGNOSIS   A migraine headache is often diagnosed based on:  · Symptoms.  · Physical exam.  · A CT scan or MRI of your head. These imaging tests cannot diagnose migraines, but they can help rule out other causes of headaches.  TREATMENT  Medicines may be given for pain and nausea. Medicines can also be given to help prevent recurrent migraines.   HOME CARE INSTRUCTIONS  · Only take over-the-counter or prescription medicines for pain or discomfort as directed by your health care provider. The use of long-term narcotics is not recommended.  · Lie down in a dark, quiet room when you have a migraine.  · Keep a journal to find out what may trigger your migraine headaches. For example, write down:    What you eat and drink.    How much sleep you get.    Any change to your diet or medicines.  · Limit alcohol consumption.  · Quit smoking if you smoke.  · Get 7-9 hours of sleep, or as recommended by your health care provider.  · Limit stress.  · Keep lights dim if bright lights bother you and make your migraines worse.  SEEK IMMEDIATE MEDICAL CARE IF:   · Your migraine becomes severe.  · You have a fever.  · You have a stiff neck.  · You have vision loss.  · You have muscular weakness or loss of muscle control.  · You start losing your balance or have trouble walking.  · You feel faint or pass  out.  · You have severe symptoms that are different from your first symptoms.  MAKE SURE YOU:   · Understand these instructions.  · Will watch your condition.  · Will get help right away if you are not doing well or get worse.     This information is not intended to replace advice given to you by your health care provider. Make sure you discuss any questions you have with your health care provider.     Document Released: 12/18/2006 Document Revised: 01/08/2016 Document Reviewed: 08/25/2014  [employer] Patient Education ©2016 [employer]            SYMPTOMS OF A STROKE    Call 911 or have someone take you to the Emergency Department if you have any of the following:    · Sudden numbness or weakness of your face, arm or leg especially on one side of the body  · Sudden confusion, diffiiculty speaking or trouble understanding   · Changes in your vision or loss of sight in one eye  · Sudden severe headache with no known cause  · sudden dizziness, trouble walking, loss of balance or coordination    It is important to seek emergency care right away if you have further stroke symptoms. If you get emergency help quickly, the powerful clot-dissolving medicines can reduce the disabilities caused by a stroke.     For more information:    American Stroke Association  5-984-3-STROKE  www.strokeassociation.org           IF YOU SMOKE OR USE TOBACCO PLEASE READ THE FOLLOWING:    Why is smoking bad for me?  Smoking increases the risk of heart disease, lung disease, vascular disease, stroke, and cancer.     If you smoke, STOP!    If you would like more information on quitting smoking, please visit the [employer] website: [email or web address]   This link will provide additional resources including the QUIT line and the Beat the Pack support groups.     For more information:    American Cancer Society  (144) 112-6893    American Heart Association  2-705-891-4265                YOU ARE THE MOST IMPORTANT FACTOR IN YOUR RECOVERY.     Follow all instructions carefully.     I have reviewed my discharge instructions with my nurse, including the following information, if applicable:     Information about my illness and diagnosis   Follow up appointments (including lab draws)   Wound Care   Equipment Needs   Medications (new and continuing) along with side effects   Preventative information such as vaccines and smoking cessations   Diet   Pain   I know when to contact my Doctor's office or seek emergency care      I want my nurse to describe the side effects of my medications: YES NO   If the answer is no, I understand the side effects of my medications: YES NO   My nurse described the side effects of my medications in a way that I could understand: YES NO   I have taken my personal belongings and my own medications with me at discharge: YES NO            I have received this information and my questions have been answered. I have discussed any concerns I see with this plan with the nurse or physician. I understand these instructions.    Signature of Patient or Responsible Person: _____________________________________    Date: _________________  Time: __________________    Signature of Healthcare Provider: _______________________________________  Date: _________________  Time: __________________

## 2017-01-14 NOTE — PLAN OF CARE
Problem: Patient Care Overview (Adult)  Goal: Plan of Care Review  Outcome: Ongoing (interventions implemented as appropriate)  Goal: Adult Individualization and Mutuality  Outcome: Ongoing (interventions implemented as appropriate)  Goal: Discharge Needs Assessment  Outcome: Ongoing (interventions implemented as appropriate)    Problem: Pain, Acute (Adult)  Goal: Identify Related Risk Factors and Signs and Symptoms  Outcome: Ongoing (interventions implemented as appropriate)  Goal: Acceptable Pain Control/Comfort Level  Outcome: Ongoing (interventions implemented as appropriate)

## 2017-01-14 NOTE — ED PROVIDER NOTES
Subjective   History of Present Illness  41-year-old white female complains of headache.  She she describes a diffuse pressure and throbbing headache.  She has a history of migraines.  She states this headache, however, is different in character in that it tends to be worse when she sits or stands.  She has associated nausea and vomiting.  She also has photophobia, but denies any aura or scotomata.  She was seen here yesterday for similar symptoms which improved after pain medications, but then recurred early this morning.  She has taken her headache medicine at home which includes multiple drugs (see her med list) without relief.  Review of Systems   All other systems reviewed and are negative.      Past Medical History   Diagnosis Date   • Abdominal pain    • Abdominal swelling    • Bipolar 1 disorder    • Brain tumor      R Frontal Lobe per pt   • Constipation    • Diarrhea    • Fibromyalgia    • IBS (irritable bowel syndrome)    • Migraine    • Nausea & vomiting    • PONV (postoperative nausea and vomiting)    • PTSD (post-traumatic stress disorder)    • Rectal bleeding        Allergies   Allergen Reactions   • Ativan [Lorazepam] Hallucinations     confusion   • Sulfa Antibiotics Shortness Of Breath and Swelling   • Compazine [Prochlorperazine Edisylate] Hives   • Demerol [Meperidine] Hives   • Droperidol    • Prochlorperazine    • Toradol [Ketorolac Tromethamine] Hives and Itching       Past Surgical History   Procedure Laterality Date   • Hysterectomy     • Appendectomy     • Shoulder surgery       3 times   • Knee surgery     • Endoscopy N/A 6/30/2016     Procedure: ESOPHAGOGASTRODUODENOSCOPY WITH BIOPSY  CPTCODE:02397;  Surgeon: Jose Antonio Belle III, MD;  Location: [facility] OR;  Service:    • Colonoscopy N/A 6/30/2016     Procedure: COLONOSCOPY  CPTCODE:12850;  Surgeon: Jose Antonio Belle III, MD;  Location: [facility] OR;  Service:    • Colonoscopy N/A 7/7/2016     Procedure: COLONOSCOPY (13741) CPT;   Surgeon: Jose Antonio Belle III, MD;  Location: [facility] OR;  Service:    • Hemorrhoidectomy N/A 7/28/2016     Procedure: HEMORRHOID STAPLING;  Surgeon: Kael Lopez MD;  Location:  [facility] OR;  Service:    • Anal scope N/A 7/28/2016     Procedure: ANAL SCOPE;  Surgeon: Kael Lopez MD;  Location:  [facility] OR;  Service:        Family History   Problem Relation Age of Onset   • Crohn's disease Other    • Hypertension Other    • Diabetes Other    • Irritable bowel syndrome Other        Social History     Social History   • Marital status:      Spouse name: N/A   • Number of children: N/A   • Years of education: N/A     Social History Main Topics   • Smoking status: Former Smoker     Types: Cigarettes     Quit date: 11/1/2015   • Smokeless tobacco: Never Used   • Alcohol use No   • Drug use: Yes     Special: Marijuana   • Sexual activity: Defer     Other Topics Concern   • None     Social History Narrative           Objective   Physical Exam   Constitutional: She is oriented to person, place, and time. She appears well-developed and well-nourished.   HENT:   Head: Normocephalic and atraumatic.   Mouth/Throat: Oropharynx is clear and moist.   Eyes: Conjunctivae and EOM are normal. Pupils are equal, round, and reactive to light.   Neck: Normal range of motion. Neck supple. No rigidity.   Cardiovascular: Normal rate, regular rhythm and normal heart sounds.  Exam reveals no gallop and no friction rub.    No murmur heard.  Pulmonary/Chest: Effort normal and breath sounds normal. No respiratory distress. She has no wheezes. She has no rales.   Abdominal: Soft. Bowel sounds are normal. She exhibits no distension. There is no tenderness. There is no rebound and no guarding.   Musculoskeletal: Normal range of motion. She exhibits no edema.   Neurological: She is alert and oriented to person, place, and time. She has normal strength and normal reflexes. No cranial nerve deficit or sensory deficit.   Skin: Skin is warm  and dry.   Psychiatric: She has a normal mood and affect.   Nursing note and vitals reviewed.      Procedures  Results for orders placed or performed during the hospital encounter of 01/14/17   Comprehensive Metabolic Panel   Result Value Ref Range    Glucose 76 70 - 110 mg/dL    BUN 14 7 - 21 mg/dL    Creatinine 0.64 0.43 - 1.29 mg/dL    Sodium 146 135 - 153 mmol/L    Potassium 3.9 3.5 - 5.3 mmol/L    Chloride 105 99 - 112 mmol/L    CO2 31.5 24.3 - 31.9 mmol/L    Calcium 9.1 7.7 - 10.0 mg/dL    Total Protein 7.1 6.0 - 8.0 g/dL    Albumin 4.30 3.50 - 5.00 g/dL    ALT (SGPT) 57 (H) 10 - 36 U/L    AST (SGOT) 55 (H) 10 - 30 U/L    Alkaline Phosphatase 57 46 - 116 U/L    Total Bilirubin 0.5 0.2 - 1.8 mg/dL    eGFR Non African Amer 102 >60 mL/min/1.73    Globulin 2.8 gm/dL    A/G Ratio 1.5 1.5 - 2.5 g/dL    BUN/Creatinine Ratio 21.9 7.0 - 25.0    Anion Gap 9.5 3.6 - 11.2 mmol/L   CBC Auto Differential   Result Value Ref Range    WBC 5.58 4.50 - 12.50 10*3/mm3    RBC 4.18 (L) 4.20 - 5.40 10*6/mm3    Hemoglobin 13.3 12.0 - 16.0 g/dL    Hematocrit 39.8 37.0 - 47.0 %    MCV 95.2 (H) 80.0 - 94.0 fL    MCH 31.8 27.0 - 33.0 pg    MCHC 33.4 33.0 - 37.0 g/dL    RDW 14.2 11.5 - 14.5 %    RDW-SD 48.8 37.0 - 54.0 fl    MPV 10.6 (H) 6.0 - 10.0 fL    Platelets 98 (L) 130 - 400 10*3/mm3    Neutrophil % 39.1 30.0 - 70.0 %    Lymphocyte % 50.7 21.0 - 51.0 %    Monocyte % 8.2 0.0 - 10.0 %    Eosinophil % 1.4 0.0 - 5.0 %    Basophil % 0.2 0.0 - 2.0 %    Immature Grans % 0.4 0.0 - 0.5 %    Neutrophils, Absolute 2.18 1.40 - 6.50 10*3/mm3    Lymphocytes, Absolute 2.83 1.00 - 3.00 10*3/mm3    Monocytes, Absolute 0.46 0.10 - 0.90 10*3/mm3    Eosinophils, Absolute 0.08 0.00 - 0.70 10*3/mm3    Basophils, Absolute 0.01 0.00 - 0.30 10*3/mm3    Immature Grans, Absolute 0.02 0.00 - 0.03 10*3/mm3   Osmolality, Calculated   Result Value Ref Range    Osmolality Calc 289.8 273.0 - 305.0 mOsm/kg     Ct Head Without Contrast    Result Date:  1/14/2017  Narrative: CT HEAD WO CONTRAST-  CLINICAL INDICATION: ha     COMPARISON: 12/20/2016  TECHNIQUE: Axial images of the brain were obtained with out intravenous contrast.  Reformatted images were created in the sagittal and coronal planes.   Radiation dose reduction techniques were utilized per ALARA protocol. Automated exposure control was initiated through either or CareDose or DoseRight software packages by  protocol.     FINDINGS: Again noted is a partially calcified mass anteriorly in the right frontal region. This measures 1.3 cm. It is extra-axial and most likely as a meningioma.  There is no parenchymal mass hemorrhage or midline shift  The ventricles, cisterns, and sulci are unremarkable. There is no hydrocephalus. There is no evidence of acute ischemia. I do not see epidural or subdural hematoma. The gray-white differentiation is appropriate. The bone window setting images show no destructive calvarial lesion or acute calvarial fracture. The posterior fossa is unremarkable.          Impression: 1.3 cm partially calcified mass which is extra-axial in the right frontal region.  This report was finalized on 1/14/2017 11:12 AM by Dr. Luis Fernando Macdonald MD.      Ct Head Without Contrast    Result Date: 12/21/2016  Narrative: CT HEAD WITHOUT CONTRAST-  CLINICAL INDICATION: Headache.     COMPARISON: None immediately available.  TECHNIQUE: Axial images of the brain were obtained with out intravenous contrast.  Reformatted images were created in the sagittal and coronal planes.  Radiation dose reduction techniques were utilized per ALARA protocol. Automated exposure control was initiated through either or CareDose or DoseRight software packages by  protocol.    FINDINGS: There is an extra-axial partially calcified mass anteriorly in the right frontal region. This measures 1.1 cm and most likely represents a partially calcified meningioma. The ventricles, cisterns, and sulci are  unremarkable. There is no hydrocephalus. There is no evidence of acute ischemia. I do not see epidural or subdural hematoma. The gray-white differentiation is appropriate. The bone window setting images show no destructive calvarial lesion or acute calvarial fracture. The posterior fossa is unremarkable.          Impression: 1.1 cm partially calcified extra-axial lesion in the right frontal region most likely a meningioma. I recommend MRI.  This report was finalized on 12/21/2016 8:48 AM by Dr. Luis Fernando Macdonald MD.             ED Course  ED Course   Comment By Time   Discussed with Dr. Horton.  Patient admitted, see orders. Moreno Ware MD 01/14 [id number]                  MDM  Number of Diagnoses or Management Options  Chronic migraine without aura, intractable, with status migrainosus: established and worsening     Amount and/or Complexity of Data Reviewed  Clinical lab tests: reviewed and ordered  Tests in the radiology section of CPT®: reviewed and ordered    Risk of Complications, Morbidity, and/or Mortality  Presenting problems: high  Diagnostic procedures: high  Management options: high        Final diagnoses:   Chronic migraine without aura, intractable, with status migrainosus            Moreno Ware MD  01/14/17 [id number]

## 2017-01-14 NOTE — Clinical Note
Level of Care: Med/Surg [1]   Admitting Physician: CHRIS BUSH [[id number]]   Attending Physician: CHRIS BUSH [[id number]]   Patient Class: Inpatient [101]

## 2017-01-14 NOTE — SIGNIFICANT NOTE
Patient is complaining of a headache stating her pain is at a level 7 on a scale of 0 to 10. 10 being the worst pain.  MD. Dr. Horton notified that the patient continues to have pain at this level and she would like to have some additional pain medication to assist with her pain.  Dr. Horton states he admitted the patient so she could receive the DHE and he would like her to continue with comfort measures until the DHE has had time to work.  Patient received her initial does in the ER and has been scheduled to receive the mediation as prescribed.  Patient notified of Dr. Chawla decision and states she is upset that he will not give her additional pain meds such as dilaudid or stadol.   Instructed patient to let me know if she needs anything to ring out to the nurses station.

## 2017-01-15 LAB
ALBUMIN SERPL-MCNC: 4.4 G/DL (ref 3.5–5)
ALBUMIN/GLOB SERPL: 1.4 G/DL (ref 1.5–2.5)
ALP SERPL-CCNC: 64 U/L (ref 46–116)
ALT SERPL W P-5'-P-CCNC: 12 U/L (ref 10–36)
ANION GAP SERPL CALCULATED.3IONS-SCNC: 8.8 MMOL/L (ref 3.6–11.2)
AST SERPL-CCNC: 52 U/L (ref 10–30)
BACTERIA SPEC AEROBE CULT: NORMAL
BASOPHILS # BLD AUTO: 0.01 10*3/MM3 (ref 0–0.3)
BASOPHILS NFR BLD AUTO: 0.2 % (ref 0–2)
BILIRUB SERPL-MCNC: 0.5 MG/DL (ref 0.2–1.8)
BUN BLD-MCNC: <5 MG/DL (ref 7–21)
BUN/CREAT SERPL: ABNORMAL (ref 7–25)
CALCIUM SPEC-SCNC: 9.6 MG/DL (ref 7.7–10)
CHLORIDE SERPL-SCNC: 110 MMOL/L (ref 99–112)
CO2 SERPL-SCNC: 26.2 MMOL/L (ref 24.3–31.9)
CREAT BLD-MCNC: 0.66 MG/DL (ref 0.43–1.29)
DEPRECATED RDW RBC AUTO: 47.3 FL (ref 37–54)
EOSINOPHIL # BLD AUTO: 0.14 10*3/MM3 (ref 0–0.7)
EOSINOPHIL NFR BLD AUTO: 2.7 % (ref 0–5)
ERYTHROCYTE [DISTWIDTH] IN BLOOD BY AUTOMATED COUNT: 14.2 % (ref 11.5–14.5)
GFR SERPL CREATININE-BSD FRML MDRD: 99 ML/MIN/1.73
GLOBULIN UR ELPH-MCNC: 3.2 GM/DL
GLUCOSE BLD-MCNC: 78 MG/DL (ref 70–110)
HCT VFR BLD AUTO: 47 % (ref 37–47)
HGB BLD-MCNC: 15.3 G/DL (ref 12–16)
IMM GRANULOCYTES # BLD: 0.01 10*3/MM3 (ref 0–0.03)
IMM GRANULOCYTES NFR BLD: 0.2 % (ref 0–0.5)
LYMPHOCYTES # BLD AUTO: 2.14 10*3/MM3 (ref 1–3)
LYMPHOCYTES NFR BLD AUTO: 41.8 % (ref 21–51)
MCH RBC QN AUTO: 31.3 PG (ref 27–33)
MCHC RBC AUTO-ENTMCNC: 32.6 G/DL (ref 33–37)
MCV RBC AUTO: 96.1 FL (ref 80–94)
MONOCYTES # BLD AUTO: 0.54 10*3/MM3 (ref 0.1–0.9)
MONOCYTES NFR BLD AUTO: 10.5 % (ref 0–10)
NEUTROPHILS # BLD AUTO: 2.28 10*3/MM3 (ref 1.4–6.5)
NEUTROPHILS NFR BLD AUTO: 44.6 % (ref 30–70)
OSMOLALITY SERPL CALC.SUM OF ELEC: NORMAL MOSM/KG (ref 273–305)
PLATELET # BLD AUTO: 97 10*3/MM3 (ref 130–400)
PMV BLD AUTO: 10.3 FL (ref 6–10)
POTASSIUM BLD-SCNC: 4.1 MMOL/L (ref 3.5–5.3)
PROT SERPL-MCNC: 7.6 G/DL (ref 6–8)
RBC # BLD AUTO: 4.89 10*6/MM3 (ref 4.2–5.4)
SODIUM BLD-SCNC: 145 MMOL/L (ref 135–153)
WBC NRBC COR # BLD: 5.12 10*3/MM3 (ref 4.5–12.5)

## 2017-01-15 PROCEDURE — 80053 COMPREHEN METABOLIC PANEL: CPT | Performed by: PSYCHIATRY & NEUROLOGY

## 2017-01-15 PROCEDURE — 63710000001 PROMETHAZINE PER 25 MG: Performed by: PSYCHIATRY & NEUROLOGY

## 2017-01-15 PROCEDURE — 25010000002 METOCLOPRAMIDE PER 10 MG: Performed by: PSYCHIATRY & NEUROLOGY

## 2017-01-15 PROCEDURE — 25010000002 HYDROMORPHONE PER 4 MG: Performed by: PSYCHIATRY & NEUROLOGY

## 2017-01-15 PROCEDURE — 85025 COMPLETE CBC W/AUTO DIFF WBC: CPT | Performed by: PSYCHIATRY & NEUROLOGY

## 2017-01-15 PROCEDURE — 25010000002 DIHYDROERGOTAMINE MESYLATE PER 1 MG: Performed by: PSYCHIATRY & NEUROLOGY

## 2017-01-15 PROCEDURE — 25810000003 DEXTROSE-NACL PER 500 ML: Performed by: PSYCHIATRY & NEUROLOGY

## 2017-01-15 PROCEDURE — 25010000002 PROMETHAZINE PER 50 MG: Performed by: PSYCHIATRY & NEUROLOGY

## 2017-01-15 RX ORDER — TEMAZEPAM 15 MG/1
30 CAPSULE ORAL ONCE
Status: COMPLETED | OUTPATIENT
Start: 2017-01-16 | End: 2017-01-15

## 2017-01-15 RX ORDER — PROMETHAZINE HYDROCHLORIDE 25 MG/ML
12.5 INJECTION, SOLUTION INTRAMUSCULAR; INTRAVENOUS EVERY 6 HOURS PRN
Status: DISCONTINUED | OUTPATIENT
Start: 2017-01-15 | End: 2017-01-15

## 2017-01-15 RX ADMIN — TOPIRAMATE 25 MG: 25 TABLET, FILM COATED ORAL at 22:02

## 2017-01-15 RX ADMIN — PROMETHAZINE HYDROCHLORIDE 12.5 MG: 25 INJECTION INTRAMUSCULAR; INTRAVENOUS at 11:40

## 2017-01-15 RX ADMIN — DIVALPROEX SODIUM 500 MG: 500 TABLET, DELAYED RELEASE ORAL at 22:03

## 2017-01-15 RX ADMIN — FLUTICASONE PROPIONATE 1 SPRAY: 50 SPRAY, METERED NASAL at 17:08

## 2017-01-15 RX ADMIN — MONTELUKAST SODIUM 10 MG: 10 TABLET, COATED ORAL at 22:09

## 2017-01-15 RX ADMIN — PROMETHAZINE HYDROCHLORIDE 12.5 MG: 25 INJECTION INTRAMUSCULAR; INTRAVENOUS at 19:48

## 2017-01-15 RX ADMIN — BUSPIRONE HYDROCHLORIDE 15 MG: 10 TABLET ORAL at 08:43

## 2017-01-15 RX ADMIN — DIVALPROEX SODIUM 500 MG: 500 TABLET, DELAYED RELEASE ORAL at 08:43

## 2017-01-15 RX ADMIN — SERTRALINE HYDROCHLORIDE 150 MG: 50 TABLET, FILM COATED ORAL at 08:43

## 2017-01-15 RX ADMIN — VALPROATE SODIUM 500 MG: 100 INJECTION, SOLUTION INTRAVENOUS at 17:08

## 2017-01-15 RX ADMIN — DIHYDROERGOTAMINE MESYLATE 0.5 MG: 1 INJECTION, SOLUTION INTRAMUSCULAR; INTRAVENOUS; SUBCUTANEOUS at 15:49

## 2017-01-15 RX ADMIN — CARBIDOPA AND LEVODOPA 1 TABLET: 10; 100 TABLET ORAL at 17:08

## 2017-01-15 RX ADMIN — HYDROMORPHONE HYDROCHLORIDE 1 MG: 1 INJECTION, SOLUTION INTRAMUSCULAR; INTRAVENOUS; SUBCUTANEOUS at 11:38

## 2017-01-15 RX ADMIN — FLUTICASONE PROPIONATE 1 SPRAY: 50 SPRAY, METERED NASAL at 08:42

## 2017-01-15 RX ADMIN — DIHYDROERGOTAMINE MESYLATE 0.5 MG: 1 INJECTION, SOLUTION INTRAMUSCULAR; INTRAVENOUS; SUBCUTANEOUS at 06:08

## 2017-01-15 RX ADMIN — DIHYDROERGOTAMINE MESYLATE 0.5 MG: 1 INJECTION, SOLUTION INTRAMUSCULAR; INTRAVENOUS; SUBCUTANEOUS at 22:03

## 2017-01-15 RX ADMIN — DEXTROSE AND SODIUM CHLORIDE 100 ML/HR: 5; 900 INJECTION, SOLUTION INTRAVENOUS at 11:37

## 2017-01-15 RX ADMIN — CETIRIZINE HYDROCHLORIDE 10 MG: 10 TABLET ORAL at 08:42

## 2017-01-15 RX ADMIN — TEMAZEPAM 30 MG: 15 CAPSULE ORAL at 23:55

## 2017-01-15 RX ADMIN — PROMETHAZINE HYDROCHLORIDE 12.5 MG: 25 INJECTION INTRAMUSCULAR; INTRAVENOUS at 11:35

## 2017-01-15 RX ADMIN — PROMETHAZINE HYDROCHLORIDE 25 MG: 25 TABLET ORAL at 08:42

## 2017-01-15 RX ADMIN — TOPIRAMATE 25 MG: 25 TABLET, FILM COATED ORAL at 08:42

## 2017-01-15 RX ADMIN — BUSPIRONE HYDROCHLORIDE 15 MG: 10 TABLET ORAL at 22:03

## 2017-01-15 RX ADMIN — BUSPIRONE HYDROCHLORIDE 15 MG: 10 TABLET ORAL at 17:08

## 2017-01-15 RX ADMIN — CARBIDOPA AND LEVODOPA 1 TABLET: 10; 100 TABLET ORAL at 08:42

## 2017-01-15 RX ADMIN — CARBIDOPA AND LEVODOPA 1 TABLET: 10; 100 TABLET ORAL at 22:02

## 2017-01-15 NOTE — PAYOR COMM NOTE
"[facility]   MADALYN ACUÑA  PHONE  [phone]  FAX  [phone]    Jes Darden (41 y.o. Female)     Date of Birth Social Security Number Address Home Phone MRN    1975  [address] [place] [address] [phone] [id number]    [employer] Mar[facility] Status          None        Admission Date Admission Type Admitting Provider Attending Provider Department, Room/Bed    1/14/17 Emergency Sol, MD Sol Rajput Arden M, MD [facility] 2 [facility], 210/1S    Discharge Date Discharge Disposition Discharge Destination                      Attending Provider: Casper Horton MD     Allergies:  Ativan [Lorazepam], Sulfa Antibiotics, Compazine [Prochlorperazine Edisylate], Demerol [Meperidine], Droperidol, Prochlorperazine, Toradol [Ketorolac Tromethamine]    Isolation:  None   Infection:  None   Code Status:  Prior    Ht:  69\" (175.3 cm)   Wt:  153 lb (69.4 kg)    Admission Cmt:  None   Principal Problem:  None                Active Insurance as of 1/14/2017     Primary Coverage     Payor Plan Insurance Group Employer/Plan Group    [facility] [employer] [facility] [employer] [facility]      Payor Plan Address Payor Plan Phone Number Effective From Effective To    [address] [address]  1/1/2015     [place] [address]       Subscriber Name Subscriber Birth Date Member ID       JES DARDEN 1975 [id number]                 Emergency Contacts      (Rel.) Home Phone Work Phone Mobile Phone    Odin Cardoza (Significant Other) -- -- [phone]               Emergency Department Notes      Moreno Ware MD at 1/14/2017 11:42 AM          Subjective   History of Present Illness  41-year-old white female complains of headache.  She she describes a diffuse pressure and throbbing headache.  She has a history of migraines.  She states this headache, however, is different in character in that it tends to be worse when she sits or stands.  She has associated nausea and vomiting.  She also has " photophobia, but denies any aura or scotomata.  She was seen here yesterday for similar symptoms which improved after pain medications, but then recurred early this morning.  She has taken her headache medicine at home which includes multiple drugs (see her med list) without relief.  Review of Systems other systems reviewed and are negative.      Past Medical History   Diagnosis Date   • Abdominal pain    • Abdominal swelling    • Bipolar 1 disorder    • Brain tumor      R Frontal Lobe per pt   • Constipation    • Diarrhea    • Fibromyalgia    • IBS (irritable bowel syndrome)    • Migraine    • Nausea & vomiting    • PONV (postoperative nausea and vomiting)    • PTSD (post-traumatic stress disorder)    • Rectal bleeding        Allergies   Allergen Reactions   • Ativan [Lorazepam] Hallucinations     confusion   • Sulfa Antibiotics Shortness Of Breath and Swelling   • Compazine [Prochlorperazine Edisylate] Hives   • Demerol [Meperidine] Hives   • Droperidol    • Prochlorperazine    • Toradol [Ketorolac Tromethamine] Hives and Itching       Past Surgical History   Procedure Laterality Date   • Hysterectomy     • Appendectomy     • Shoulder surgery       3 times   • Knee surgery     • Endoscopy N/A 6/30/2016     Procedure: ESOPHAGOGASTRODUODENOSCOPY WITH BIOPSY  CPTCODE:41426;  Surgeon: Jose Antonio Belle III, MD;  Location: [facility] OR;  Service:    • Colonoscopy N/A 6/30/2016     Procedure: COLONOSCOPY  CPTCODE:47559;  Surgeon: Jose Antonio Belle III, MD;  Location: [facility] OR;  Service:    • Colonoscopy N/A 7/7/2016     Procedure: COLONOSCOPY (31534) CPT;  Surgeon: Jose Antonio Belle III, MD;  Location: [facility] OR;  Service:    • Hemorrhoidectomy N/A 7/28/2016     Procedure: HEMORRHOID STAPLING;  Surgeon: Kael Lopez MD;  Location: [facility] OR;  Service:    • Anal scope N/A 7/28/2016     Procedure: ANAL SCOPE;  Surgeon: Kael Lopez MD;  Location: [facility] OR;  Service:        Family History   Problem Relation Age  of Onset   • Crohn's disease Other    • Hypertension Other    • Diabetes Other    • Irritable bowel syndrome Other        Social History     Social History   • Marital status:      Spouse name: N/A   • Number of children: N/A   • Years of education: N/A     Social History Main Topics   • Smoking status: Former Smoker     Types: Cigarettes     Quit date: 11/1/2015   • Smokeless tobacco: Never Used   • Alcohol use No   • Drug use: Yes     Special: Marijuana   • Sexual activity: Defer     Other Topics Concern   • None     Social History Narrative           Objective   Physical Exam   Constitutional: She is oriented to person, place, and time. She appears well-developed and well-nourished.   HENT:   Head: Normocephalic and atraumatic.   Mouth/Throat: Oropharynx is clear and moist.   Eyes: Conjunctivae and EOM are normal. Pupils are equal, round, and reactive to light.   Neck: Normal range of motion. Neck supple. No rigidity.   Cardiovascular: Normal rate, regular rhythm and normal heart sounds.  Exam reveals no gallop and no friction rub.  murmur heard.  Pulmonary/Chest: Effort normal and breath sounds normal. No respiratory distress. She has no wheezes. She has no rales.   Abdominal: Soft. Bowel sounds are normal. She exhibits no distension. There is no tenderness. There is no rebound and no guarding.   Musculoskeletal: Normal range of motion. She exhibits no edema.   Neurological: She is alert and oriented to person, place, and time. She has normal strength and normal reflexes. No cranial nerve deficit or sensory deficit.   Skin: Skin is warm and dry.   Psychiatric: She has a normal mood and affect. note and vitals reviewed.      Procedures  Results for orders placed or performed during the hospital encounter of 01/14/17   Comprehensive Metabolic Panel   Result Value Ref Range    Glucose 76 70 - 110 mg/dL    BUN 14 7 - 21 mg/dL    Creatinine 0.64 0.43 - 1.29 mg/dL    Sodium 146 135 - 153 mmol/L    Potassium  3.9 3.5 - 5.3 mmol/L    Chloride 105 99 - 112 mmol/L    CO2 31.5 24.3 - 31.9 mmol/L    Calcium 9.1 7.7 - 10.0 mg/dL    Total Protein 7.1 6.0 - 8.0 g/dL    Albumin 4.30 3.50 - 5.00 g/dL    ALT (SGPT) 57 (H) 10 - 36 U/L    AST (SGOT) 55 (H) 10 - 30 U/L    Alkaline Phosphatase 57 46 - 116 U/L    Total Bilirubin 0.5 0.2 - 1.8 mg/dL    eGFR Non African Amer 102 >60 mL/min/1.73    Globulin 2.8 gm/dL    A/G Ratio 1.5 1.5 - 2.5 g/dL    BUN/Creatinine Ratio 21.9 7.0 - 25.0    Anion Gap 9.5 3.6 - 11.2 mmol/L   CBC Auto Differential   Result Value Ref Range    WBC 5.58 4.50 - 12.50 10*3/mm3    RBC 4.18 (L) 4.20 - 5.40 10*6/mm3    Hemoglobin 13.3 12.0 - 16.0 g/dL    Hematocrit 39.8 37.0 - 47.0 %    MCV 95.2 (H) 80.0 - 94.0 fL    MCH 31.8 27.0 - 33.0 pg    MCHC 33.4 33.0 - 37.0 g/dL    RDW 14.2 11.5 - 14.5 %    RDW-SD 48.8 37.0 - 54.0 fl    MPV 10.6 (H) 6.0 - 10.0 fL    Platelets 98 (L) 130 - 400 10*3/mm3    Neutrophil % 39.1 30.0 - 70.0 %    Lymphocyte % 50.7 21.0 - 51.0 %    Monocyte % 8.2 0.0 - 10.0 %    Eosinophil % 1.4 0.0 - 5.0 %    Basophil % 0.2 0.0 - 2.0 %    Immature Grans % 0.4 0.0 - 0.5 %    Neutrophils, Absolute 2.18 1.40 - 6.50 10*3/mm3    Lymphocytes, Absolute 2.83 1.00 - 3.00 10*3/mm3    Monocytes, Absolute 0.46 0.10 - 0.90 10*3/mm3    Eosinophils, Absolute 0.08 0.00 - 0.70 10*3/mm3    Basophils, Absolute 0.01 0.00 - 0.30 10*3/mm3    Immature Grans, Absolute 0.02 0.00 - 0.03 10*3/mm3   Osmolality, Calculated   Result Value Ref Range    Osmolality Calc 289.8 273.0 - 305.0 mOsm/kg     Ct Head Without Contrast    Result Date: 1/14/2017  Narrative: CT HEAD WO CONTRAST-  CLINICAL INDICATION: ha     COMPARISON: 12/20/2016  TECHNIQUE: Axial images of the brain were obtained with out intravenous contrast.  Reformatted images were created in the sagittal and coronal planes.   Radiation dose reduction techniques were utilized per ALARA protocol. Automated exposure control was initiated through either or [employer] or  [employer] software packages by  protocol.     FINDINGS: Again noted is a partially calcified mass anteriorly in the right frontal region. This measures 1.3 cm. It is extra-axial and most likely as a meningioma.  There is no parenchymal mass hemorrhage or midline shift  The ventricles, cisterns, and sulci are unremarkable. There is no hydrocephalus. There is no evidence of acute ischemia. I do not see epidural or subdural hematoma. The gray-white differentiation is appropriate. The bone window setting images show no destructive calvarial lesion or acute calvarial fracture. The posterior fossa is unremarkable.          Impression: 1.3 cm partially calcified mass which is extra-axial in the right frontal region.  This report was finalized on 1/14/2017 11:12 AM by Dr. Luis Fernando Macdonald MD.      Ct Head Without Contrast    Result Date: 12/21/2016  Narrative: CT HEAD WITHOUT CONTRAST-  CLINICAL INDICATION: Headache.     COMPARISON: None immediately available.  TECHNIQUE: Axial images of the brain were obtained with out intravenous contrast.  Reformatted images were created in the sagittal and coronal planes.  Radiation dose reduction techniques were utilized per ALARA protocol. Automated exposure control was initiated through either or [employer] or [employer]oseRight software packages by  protocol.    FINDINGS: There is an extra-axial partially calcified mass anteriorly in the right frontal region. This measures 1.1 cm and most likely represents a partially calcified meningioma. The ventricles, cisterns, and sulci are unremarkable. There is no hydrocephalus. There is no evidence of acute ischemia. I do not see epidural or subdural hematoma. The gray-white differentiation is appropriate. The bone window setting images show no destructive calvarial lesion or acute calvarial fracture. The posterior fossa is unremarkable.          Impression: 1.1 cm partially calcified extra-axial lesion in the right frontal region  most likely a meningioma. I recommend MRI.  This report was finalized on 12/21/2016 8:48 AM by Dr. Luis Fernando Macdonald MD.            ED Course  ED Course   Comment By Time   Discussed with Dr. Horton.  Patient admitted, see orders. Moreno Ware MD 01/14 1157                  MDM  Number of Diagnoses or Management Options  Chronic migraine without aura, intractable, with status migrainosus: established and worsening     Amount and/or Complexity of Data Reviewed  Clinical lab tests: reviewed and ordered  Tests in the radiology section of CPT®:  reviewed and ordered    Risk of Complications, Morbidity, and/or Mortality  Presenting problems: high  Diagnostic procedures: high  Management options: high        Final diagnoses:   Chronic migraine without aura, intractable, with status migrainosus           Moreno Ware MD  01/14/17 1152       Electronically signed by Moreno Ware MD at 1/14/2017 11:57 AM        Hospital Medications (all)       Dose Frequency Start End    busPIRone (BUSPAR) tablet 15 mg 15 mg 3 Times Daily 1/14/2017     Sig - Route: Take 15 mg by mouth 3 (Three) Times a Day. - Oral    butalbital-acetaminophen-caffeine (FIORICET, ESGIC) -40 MG per tablet 1 tablet 1 tablet Every 6 Hours PRN 1/14/2017 12/20/2017    Sig - Route: Take 1 tablet by mouth Every 6 (Six) Hours As Needed for headaches or migraine. - Oral    carbidopa-levodopa (SINEMET)  MG per tablet 1 tablet 1 tablet 3 Times Daily 1/14/2017     Sig - Route: Take 1 tablet by mouth 3 (Three) Times a Day. - Oral    cetirizine (zyrTEC) tablet 10 mg 10 mg Daily 1/14/2017     Sig - Route: Take 1 tablet by mouth Daily. - Oral    dextrose 5 % and sodium chloride 0.9 % infusion 100 mL/hr Continuous 1/14/2017     Sig - Route: Infuse 100 mL/hr into a venous catheter Continuous. - Intravenous    dihydroergotamine (DHE) injection 0.5 mg 0.5 mg Every 8 Hours Scheduled 1/14/2017 1/17/2017    Sig - Route: Infuse 0.5 mL into a venous catheter Every 8 (Eight)  Hours. - Intravenous    dihydroergotamine (DHE) injection 1 mg 1 mg Once 1/14/2017 1/14/2017    Sig - Route: Infuse 1 mL into a venous catheter 1 (One) Time. - Intravenous    divalproex (DEPAKOTE) DR tablet 500 mg 500 mg Every 12 Hours Scheduled 1/14/2017     Sig - Route: Take 1 tablet by mouth Every 12 (Twelve) Hours. - Oral    famotidine (PEPCID) tablet 40 mg 40 mg 2 Times Daily PRN 1/14/2017     Sig - Route: Take 2 tablets by mouth 2 (Two) Times a Day As Needed for heartburn. - Oral    fluticasone (FLONASE) 50 MCG/ACT nasal spray 1 spray 1 spray 2 Times Daily 1/14/2017 1/20/2017    Sig - Route: 1 spray into each nostril 2 (Two) Times a Day. - Nasal    HYDROmorphone (DILAUDID) injection 1 mg 1 mg Once 1/14/2017 1/14/2017    Sig - Route: Infuse 1 mg into a venous catheter 1 (One) Time. - Intravenous    HYDROmorphone (DILAUDID) injection 1 mg 1 mg Once 1/14/2017 1/14/2017    Sig - Route: Infuse 1 mg into a venous catheter 1 (One) Time. - Intravenous    HYDROmorphone (DILAUDID) injection 1 mg 1 mg Once 1/15/2017 1/15/2017    Sig - Route: Infuse 1 mg into a venous catheter 1 (One) Time. - Intravenous    metoclopramide (REGLAN) injection 10 mg 10 mg Every 8 Hours 1/14/2017 1/17/2017    Sig - Route: Infuse 2 mL into a venous catheter Every 8 (Eight) Hours. - Intravenous    montelukast (SINGULAIR) tablet 10 mg 10 mg Nightly 1/14/2017     Sig - Route: Take 1 tablet by mouth Every Night. - Oral    pantoprazole (PROTONIX) EC tablet 40 mg 40 mg Daily PRN 1/14/2017     Sig - Route: Take 1 tablet by mouth Daily As Needed (heartburn). - Oral    promethazine (PHENERGAN) 12.5 mg in sodium chloride 0.9 % 50 mL 12.5 mg Once 1/14/2017 1/14/2017    Sig - Route: Infuse 12.5 mg into a venous catheter 1 (One) Time. - Intravenous    promethazine (PHENERGAN) 12.5 mg in sodium chloride 0.9 % 50 mL 12.5 mg Once 1/15/2017 1/15/2017    Sig - Route: Infuse 12.5 mg into a venous catheter 1 (One) Time. - Intravenous    promethazine (PHENERGAN)  "12.5 mg in sodium chloride 0.9 % 50 mL 12.5 mg Every 6 Hours PRN 1/15/2017     Sig - Route: Infuse 12.5 mg into a venous catheter Every 6 (Six) Hours As Needed for nausea or vomiting. - Intravenous    sertraline (ZOLOFT) tablet 150 mg 150 mg Daily 1/14/2017     Sig - Route: Take 3 tablets by mouth Daily. - Oral    sodium chloride 0.9 % bolus 1,000 mL 1,000 mL Once 1/14/2017 1/14/2017    Sig - Route: Infuse 1,000 mL into a venous catheter 1 (One) Time. - Intravenous    sodium chloride 0.9 % flush 10 mL 10 mL As Needed 1/14/2017     Sig - Route: Infuse 10 mL into a venous catheter As Needed for line care. - Intravenous    Linked Group 1:  \"And\" Linked Group Details        temazepam (RESTORIL) capsule 30 mg 30 mg Once 1/14/2017 1/14/2017    Sig - Route: Take 2 capsules by mouth 1 (One) Time. - Oral    topiramate (TOPAMAX) tablet 25 mg 25 mg Every 12 Hours Scheduled 1/14/2017     Sig - Route: Take 1 tablet by mouth Every 12 (Twelve) Hours. - Oral    cetirizine (zyrTEC) tablet 10 mg (Discontinued) 10 mg Daily 1/14/2017 1/14/2017    Sig - Route: Take 1 tablet by mouth Daily. - Oral    promethazine (PHENERGAN) injection 12.5 mg (Discontinued) 12.5 mg Every 6 Hours PRN 1/15/2017 1/15/2017    Sig - Route: Infuse 0.5 mL into a venous catheter Every 6 (Six) Hours As Needed for nausea or vomiting. - Intravenous    Reason for Discontinue: Error    promethazine (PHENERGAN) tablet 25 mg (Discontinued) 25 mg 2 Times Daily PRN 1/14/2017 1/15/2017    Sig - Route: Take 1 tablet by mouth 2 (Two) Times a Day As Needed for nausea or vomiting. - Oral          Lab Results (last 24 hours)     Procedure Component Value Units Date/Time    CBC & Differential [[id number]] Collected:  01/15/17 1125    Specimen:  Blood Updated:  01/15/17 1156    Narrative:       The following orders were created for panel order CBC & Differential.  Procedure                               Abnormality         Status                     ---------                       "         -----------         ------                     CBC Auto Differential[[id number]]         Abnormal            Final result                 Please view results for these tests on the individual orders.    CBC Auto Differential [[id number]]  (Abnormal) Collected:  01/15/17 1125    Specimen:  Blood Updated:  01/15/17 1156     WBC 5.12 10*3/mm3      RBC 4.89 10*6/mm3      Hemoglobin 15.3 g/dL      Hematocrit 47.0 %      MCV 96.1 (H) fL      MCH 31.3 pg      MCHC 32.6 (L) g/dL      RDW 14.2 %      RDW-SD 47.3 fl      MPV 10.3 (H) fL      Platelets 97 (L) 10*3/mm3      Neutrophil % 44.6 %      Lymphocyte % 41.8 %      Monocyte % 10.5 (H) %      Eosinophil % 2.7 %      Basophil % 0.2 %      Immature Grans % 0.2 %      Neutrophils, Absolute 2.28 10*3/mm3      Lymphocytes, Absolute 2.14 10*3/mm3      Monocytes, Absolute 0.54 10*3/mm3      Eosinophils, Absolute 0.14 10*3/mm3      Basophils, Absolute 0.01 10*3/mm3      Immature Grans, Absolute 0.01 10*3/mm3     Comprehensive Metabolic Panel [[id number]]  (Abnormal) Collected:  01/15/17 1125    Specimen:  Blood Updated:  01/15/17 1223     Glucose 78 mg/dL      BUN <5 (L) mg/dL      Creatinine 0.66 mg/dL      Sodium 145 mmol/L      Potassium 4.1 mmol/L      Chloride 110 mmol/L      CO2 26.2 mmol/L      Calcium 9.6 mg/dL      Total Protein 7.6 g/dL      Albumin 4.40 g/dL      ALT (SGPT) 12 U/L      AST (SGOT) 52 (H) U/L      Alkaline Phosphatase 64 U/L      Total Bilirubin 0.5 mg/dL      eGFR Non African Amer 99 mL/min/1.73      Globulin 3.2 gm/dL      A/G Ratio 1.4 (L) g/dL      BUN/Creatinine Ratio --       Unable to calculate Bun/Crea Ratio.        Anion Gap 8.8 mmol/L     Osmolality, Calculated [[id number]] Collected:  01/15/17 1125    Specimen:  Blood Updated:  01/15/17 1223     Osmolality Calc -- mOsm/kg       Unable to calculate.             Imaging Results (last 24 hours)     ** No results found for the last 24 hours. **        Orders (last 24 hrs)     Start     Ordered     01/16/17 0600  Valproic Acid Level, Total  Morning Draw      01/15/17 1056    01/15/17 1208  Osmolality, Calculated  Once      01/15/17 1207    01/15/17 1130  HYDROmorphone (DILAUDID) injection 1 mg  Once      01/15/17 1056    01/15/17 1105  promethazine (PHENERGAN) 12.5 mg in sodium chloride 0.9 % 50 mL  Every 6 Hours PRN      01/15/17 1105    01/15/17 1055  promethazine (PHENERGAN) injection 12.5 mg  Every 6 Hours PRN,   Status:  Discontinued      01/15/17 1056    01/15/17 1055  CBC & Differential  Once      01/15/17 1056    01/15/17 1055  Comprehensive Metabolic Panel  Once      01/15/17 1056    01/15/17 1055  CBC Auto Differential  PROCEDURE ONCE      01/15/17 1056    01/15/17 1030  promethazine (PHENERGAN) 12.5 mg in sodium chloride 0.9 % 50 mL  Once      01/15/17 0945    01/14/17 2200  metoclopramide (REGLAN) injection 10 mg  Every 8 Hours      01/14/17 1339    01/14/17 2200  dihydroergotamine (DHE) injection 0.5 mg  Every 8 Hours Scheduled      01/14/17 1339    01/14/17 2100  montelukast (SINGULAIR) tablet 10 mg  Nightly      01/14/17 1347    01/14/17 2100  temazepam (RESTORIL) capsule 30 mg  Once      01/14/17 1859    01/14/17 1800  fluticasone (FLONASE) 50 MCG/ACT nasal spray 1 spray  2 Times Daily      01/14/17 1347    01/14/17 1750  Inpatient Consult to Internal Medicine  Once     Specialty:  Internal Medicine  Provider:  Brett Coates MD    01/14/17 1750    01/14/17 1600  busPIRone (BUSPAR) tablet 15 mg  3 Times Daily      01/14/17 1347    01/14/17 1600  carbidopa-levodopa (SINEMET)  MG per tablet 1 tablet  3 Times Daily      01/14/17 1347    01/14/17 1430  sertraline (ZOLOFT) tablet 150 mg  Daily      01/14/17 1347    01/14/17 1430  dextrose 5 % and sodium chloride 0.9 % infusion  Continuous      01/14/17 1349    01/14/17 1430  cetirizine (zyrTEC) tablet 10 mg  Daily      01/14/17 1354    01/14/17 1400  divalproex (DEPAKOTE) DR tablet 500 mg  Every 12 Hours Scheduled      01/14/17 1347     01/14/17 1400  topiramate (TOPAMAX) tablet 25 mg  Every 12 Hours Scheduled      01/14/17 1347    01/14/17 1340  famotidine (PEPCID) tablet 40 mg  2 Times Daily PRN      01/14/17 1347    01/14/17 1340  butalbital-acetaminophen-caffeine (FIORICET, ESGIC) -40 MG per tablet 1 tablet  Every 6 Hours PRN      01/14/17 1347    01/14/17 1340  pantoprazole (PROTONIX) EC tablet 40 mg  Daily PRN      01/14/17 1347    01/14/17 1340  promethazine (PHENERGAN) tablet 25 mg  2 Times Daily PRN,   Status:  Discontinued      01/14/17 1347    01/14/17 0954  sodium chloride 0.9 % flush 10 mL  As Needed      01/14/17 0956    --  topiramate (TOPAMAX) 25 MG tablet  2 Times Daily      01/14/17 1305    --  famotidine (PEPCID) 40 MG tablet  2 Times Daily PRN      01/14/17 1305    --  carbidopa-levodopa (SINEMET)  MG per tablet  3 Times Daily      01/14/17 1305

## 2017-01-15 NOTE — PAYOR COMM NOTE
"[facility]   MADALYN ACUÑA  PHONE  [phone]  FAX  [phone]    Susanna Darden (41 y.o. Female)     Date of Birth Social Security Number Address Home Phone MRN    1975  [address] [place] [address] [phone] [id number]    [employer] Marital Status          None        Admission Date Admission Type Admitting Provider Attending Provider Department, Room/Bed    1/14/17 Emergency Acob, MD Sol Rajput, Casper HUNT MD [facility] 2 [facility], 210/1S    Discharge Date Discharge Disposition Discharge Destination                      Attending Provider: Casper Horton MD     Allergies:  Ativan [Lorazepam], Sulfa Antibiotics, Compazine [Prochlorperazine Edisylate], Demerol [Meperidine], Droperidol, Prochlorperazine, Toradol [Ketorolac Tromethamine]    Isolation:  None   Infection:  None   Code Status:  Prior    Ht:  69\" (175.3 cm)   Wt:  153 lb (69.4 kg)    Admission Cmt:  None   Principal Problem:  None                Active Insurance as of 1/14/2017     Primary Coverage     Payor Plan Insurance Group Employer/Plan Group    [facility] [employer] [facility] [employer] [facility]      Payor Plan Address Payor Plan Phone Number Effective From Effective To    [address] [address]  1/1/2015     [place] [address]       Subscriber Name Subscriber Birth Date Member ID       SUSANNA DARDEN 1975 [id number]                 Emergency Contacts      (Rel.) Home Phone Work Phone Mobile Phone    Odin Cardoza (Significant Other) -- -- [phone]            History & Physical     No notes of this type exist for this encounter.        Hospital Medications (all)       Dose Frequency Start End    busPIRone (BUSPAR) tablet 15 mg 15 mg 3 Times Daily 1/14/2017     Sig - Route: Take 15 mg by mouth 3 (Three) Times a Day. - Oral    butalbital-acetaminophen-caffeine (FIORICET, ESGIC) -40 MG per tablet 1 tablet 1 tablet Every 6 Hours PRN 1/14/2017 12/20/2017    Sig - Route: Take 1 tablet by " mouth Every 6 (Six) Hours As Needed for headaches or migraine. - Oral    carbidopa-levodopa (SINEMET)  MG per tablet 1 tablet 1 tablet 3 Times Daily 1/14/2017     Sig - Route: Take 1 tablet by mouth 3 (Three) Times a Day. - Oral    cetirizine (zyrTEC) tablet 10 mg 10 mg Daily 1/14/2017     Sig - Route: Take 1 tablet by mouth Daily. - Oral    dextrose 5 % and sodium chloride 0.9 % infusion 100 mL/hr Continuous 1/14/2017     Sig - Route: Infuse 100 mL/hr into a venous catheter Continuous. - Intravenous    dihydroergotamine (DHE) injection 0.5 mg 0.5 mg Every 8 Hours Scheduled 1/14/2017 1/17/2017    Sig - Route: Infuse 0.5 mL into a venous catheter Every 8 (Eight) Hours. - Intravenous    dihydroergotamine (DHE) injection 1 mg 1 mg Once 1/14/2017 1/14/2017    Sig - Route: Infuse 1 mL into a venous catheter 1 (One) Time. - Intravenous    divalproex (DEPAKOTE) DR tablet 500 mg 500 mg Every 12 Hours Scheduled 1/14/2017     Sig - Route: Take 1 tablet by mouth Every 12 (Twelve) Hours. - Oral    famotidine (PEPCID) tablet 40 mg 40 mg 2 Times Daily PRN 1/14/2017     Sig - Route: Take 2 tablets by mouth 2 (Two) Times a Day As Needed for heartburn. - Oral    fluticasone (FLONASE) 50 MCG/ACT nasal spray 1 spray 1 spray 2 Times Daily 1/14/2017 1/20/2017    Sig - Route: 1 spray into each nostril 2 (Two) Times a Day. - Nasal    HYDROmorphone (DILAUDID) injection 1 mg 1 mg Once 1/14/2017 1/14/2017    Sig - Route: Infuse 1 mg into a venous catheter 1 (One) Time. - Intravenous    HYDROmorphone (DILAUDID) injection 1 mg 1 mg Once 1/14/2017 1/14/2017    Sig - Route: Infuse 1 mg into a venous catheter 1 (One) Time. - Intravenous    metoclopramide (REGLAN) injection 10 mg 10 mg Every 8 Hours 1/14/2017 1/17/2017    Sig - Route: Infuse 2 mL into a venous catheter Every 8 (Eight) Hours. - Intravenous    montelukast (SINGULAIR) tablet 10 mg 10 mg Nightly 1/14/2017     Sig - Route: Take 1 tablet by mouth Every Night. - Oral     "pantoprazole (PROTONIX) EC tablet 40 mg 40 mg Daily PRN 1/14/2017     Sig - Route: Take 1 tablet by mouth Daily As Needed (heartburn). - Oral    promethazine (PHENERGAN) 12.5 mg in sodium chloride 0.9 % 50 mL 12.5 mg Once 1/14/2017 1/14/2017    Sig - Route: Infuse 12.5 mg into a venous catheter 1 (One) Time. - Intravenous    promethazine (PHENERGAN) tablet 25 mg 25 mg 2 Times Daily PRN 1/14/2017     Sig - Route: Take 1 tablet by mouth 2 (Two) Times a Day As Needed for nausea or vomiting. - Oral    sertraline (ZOLOFT) tablet 150 mg 150 mg Daily 1/14/2017     Sig - Route: Take 3 tablets by mouth Daily. - Oral    sodium chloride 0.9 % bolus 1,000 mL 1,000 mL Once 1/14/2017 1/14/2017    Sig - Route: Infuse 1,000 mL into a venous catheter 1 (One) Time. - Intravenous    sodium chloride 0.9 % flush 10 mL 10 mL As Needed 1/14/2017     Sig - Route: Infuse 10 mL into a venous catheter As Needed for line care. - Intravenous    Linked Group 1:  \"And\" Linked Group Details        temazepam (RESTORIL) capsule 30 mg 30 mg Once 1/14/2017 1/14/2017    Sig - Route: Take 2 capsules by mouth 1 (One) Time. - Oral    topiramate (TOPAMAX) tablet 25 mg 25 mg Every 12 Hours Scheduled 1/14/2017     Sig - Route: Take 1 tablet by mouth Every 12 (Twelve) Hours. - Oral    cetirizine (zyrTEC) tablet 10 mg (Discontinued) 10 mg Daily 1/14/2017 1/14/2017    Sig - Route: Take 1 tablet by mouth Daily. - Oral          Lab Results (last 24 hours)     Procedure Component Value Units Date/Time    CBC & Differential [[id number]] Collected:  01/14/17 1000    Specimen:  Blood Updated:  01/14/17 1022    Narrative:       The following orders were created for panel order CBC & Differential.  Procedure                               Abnormality         Status                     ---------                               -----------         ------                     CBC Auto Differential[[id number]]         Abnormal            Final result                 Please view " results for these tests on the individual orders.    CBC Auto Differential [[id number]]  (Abnormal) Collected:  01/14/17 1000    Specimen:  Blood Updated:  01/14/17 1022     WBC 5.58 10*3/mm3      RBC 4.18 (L) 10*6/mm3      Hemoglobin 13.3 g/dL      Hematocrit 39.8 %      MCV 95.2 (H) fL      MCH 31.8 pg      MCHC 33.4 g/dL      RDW 14.2 %      RDW-SD 48.8 fl      MPV 10.6 (H) fL      Platelets 98 (L) 10*3/mm3      Neutrophil % 39.1 %      Lymphocyte % 50.7 %      Monocyte % 8.2 %      Eosinophil % 1.4 %      Basophil % 0.2 %      Immature Grans % 0.4 %      Neutrophils, Absolute 2.18 10*3/mm3      Lymphocytes, Absolute 2.83 10*3/mm3      Monocytes, Absolute 0.46 10*3/mm3      Eosinophils, Absolute 0.08 10*3/mm3      Basophils, Absolute 0.01 10*3/mm3      Immature Grans, Absolute 0.02 10*3/mm3     Comprehensive Metabolic Panel [[id number]]  (Abnormal) Collected:  01/14/17 1000    Specimen:  Blood Updated:  01/14/17 1035     Glucose 76 mg/dL      BUN 14 mg/dL      Creatinine 0.64 mg/dL      Sodium 146 mmol/L      Potassium 3.9 mmol/L      Chloride 105 mmol/L      CO2 31.5 mmol/L      Calcium 9.1 mg/dL      Total Protein 7.1 g/dL      Albumin 4.30 g/dL      ALT (SGPT) 57 (H) U/L      AST (SGOT) 55 (H) U/L      Alkaline Phosphatase 57 U/L      Total Bilirubin 0.5 mg/dL      eGFR Non African Amer 102 mL/min/1.73      Globulin 2.8 gm/dL      A/G Ratio 1.5 g/dL      BUN/Creatinine Ratio 21.9      Anion Gap 9.5 mmol/L     Osmolality, Calculated [[id number]]  (Normal) Collected:  01/14/17 1000    Specimen:  Blood Updated:  01/14/17 1035     Osmolality Calc 289.8 mOsm/kg           Imaging Results (last 24 hours)     Procedure Component Value Units Date/Time    CT Head Without Contrast [[id number]] Collected:  01/14/17 1111     Updated:  01/14/17 1114    Narrative:       CT HEAD WO CONTRAST-     CLINICAL INDICATION: ha          COMPARISON: 12/20/2016      TECHNIQUE: Axial images of the brain were obtained with out  intravenous  contrast.  Reformatted images were created in the sagittal and coronal  planes.        Radiation dose reduction techniques were utilized per ALARA protocol.  Automated exposure control was initiated through either or [employer] or  [employer] software packages by  protocol.           FINDINGS:   Again noted is a partially calcified mass anteriorly in the right  frontal region. This measures 1.3 cm. It is extra-axial and most likely  as a meningioma.     There is no parenchymal mass hemorrhage or midline shift     The ventricles, cisterns, and sulci are unremarkable. There is no  hydrocephalus.   There is no evidence of acute ischemia.  I do not see epidural or subdural hematoma.  The gray-white differentiation is appropriate.   The bone window setting images show no destructive calvarial lesion or  acute calvarial fracture.   The posterior fossa is unremarkable.             Impression:       1.3 cm partially calcified mass which is extra-axial in the  right frontal region.     This report was finalized on 1/14/2017 11:12 AM by Dr. Luis Fernando Macdonald MD.             ECG/EMG Results (last 24 hours)     ** No results found for the last 24 hours. **        Orders (last 24 hrs)     Start     Ordered    01/14/17 2200  metoclopramide (REGLAN) injection 10 mg  Every 8 Hours      01/14/17 1339    01/14/17 2200  dihydroergotamine (DHE) injection 0.5 mg  Every 8 Hours Scheduled      01/14/17 1339    01/14/17 2100  montelukast (SINGULAIR) tablet 10 mg  Nightly      01/14/17 1347    01/14/17 2100  temazepam (RESTORIL) capsule 30 mg  Once      01/14/17 1859    01/14/17 1800  fluticasone (FLONASE) 50 MCG/ACT nasal spray 1 spray  2 Times Daily      01/14/17 1347    01/14/17 1750  Inpatient Consult to Internal Medicine  Once     Specialty:  Internal Medicine  Provider:  Brett Coates MD    01/14/17 1750    01/14/17 1600  busPIRone (BUSPAR) tablet 15 mg  3 Times Daily      01/14/17 1347    01/14/17 1600   carbidopa-levodopa (SINEMET)  MG per tablet 1 tablet  3 Times Daily      01/14/17 1347    01/14/17 1430  cetirizine (zyrTEC) tablet 10 mg  Daily,   Status:  Discontinued      01/14/17 1347    01/14/17 1430  sertraline (ZOLOFT) tablet 150 mg  Daily      01/14/17 1347    01/14/17 1430  dextrose 5 % and sodium chloride 0.9 % infusion  Continuous      01/14/17 1349    01/14/17 1430  cetirizine (zyrTEC) tablet 10 mg  Daily      01/14/17 1354    01/14/17 1400  divalproex (DEPAKOTE) DR tablet 500 mg  Every 12 Hours Scheduled      01/14/17 1347    01/14/17 1400  topiramate (TOPAMAX) tablet 25 mg  Every 12 Hours Scheduled      01/14/17 1347    01/14/17 1345  Diet Regular  Diet Effective Now      01/14/17 1347    01/14/17 1345  Vital Signs  Once      01/14/17 1347    01/14/17 1340  famotidine (PEPCID) tablet 40 mg  2 Times Daily PRN      01/14/17 1347    01/14/17 1340  butalbital-acetaminophen-caffeine (FIORICET, ESGIC) -40 MG per tablet 1 tablet  Every 6 Hours PRN      01/14/17 1347    01/14/17 1340  pantoprazole (PROTONIX) EC tablet 40 mg  Daily PRN      01/14/17 1347    01/14/17 1340  promethazine (PHENERGAN) tablet 25 mg  2 Times Daily PRN      01/14/17 1347    01/14/17 1156  dihydroergotamine (DHE) injection 1 mg  Once      01/14/17 1154    01/14/17 1154  Inpatient Admission  Once      01/14/17 1153    01/14/17 1151  Neurology (on-call MD unless specified)  Once     Specialty:  Neurology  Provider:  Casper Horton MD    01/14/17 1151    01/14/17 1151  ED IP Bed Request  Once      01/14/17 1151    01/14/17 1113  HYDROmorphone (DILAUDID) injection 1 mg  Once      01/14/17 1111    01/14/17 1036  Osmolality, Calculated  Once      01/14/17 1035    01/14/17 0958  sodium chloride 0.9 % bolus 1,000 mL  Once      01/14/17 0956    01/14/17 0958  HYDROmorphone (DILAUDID) injection 1 mg  Once      01/14/17 0956    01/14/17 0958  promethazine (PHENERGAN) 12.5 mg in sodium chloride 0.9 % 50 mL  Once      01/14/17 0956     01/14/17 0955  Insert peripheral IV  Once      01/14/17 0956    01/14/17 0955  CBC & Differential  Once      01/14/17 0956    01/14/17 0955  Comprehensive Metabolic Panel  Once      01/14/17 0956    01/14/17 0955  CT Head Without Contrast  1 Time Imaging      01/14/17 0956    01/14/17 0955  CBC Auto Differential  PROCEDURE ONCE      01/14/17 0957    01/14/17 0954  sodium chloride 0.9 % flush 10 mL  As Needed      01/14/17 0956    --  butorphanol (STADOL) 10 MG/ML nasal spray  Every 4 Hours PRN,   Status:  Discontinued      01/14/17 1238    --  topiramate (TOPAMAX) 25 MG tablet  2 Times Daily      01/14/17 1305    --  famotidine (PEPCID) 40 MG tablet  2 Times Daily PRN      01/14/17 1305    --  carbidopa-levodopa (SINEMET)  MG per tablet  3 Times Daily      01/14/17 1305          Physician Progress Notes (last 24 hours) (Notes from 1/14/2017  8:53 AM through 1/15/2017  8:53 AM)     No notes of this type exist for this encounter.

## 2017-01-15 NOTE — PROGRESS NOTES
"   Assessment:  (Status Migrainosus).     Plan:   (-  DHE as to KAELYN's Protocol.  -  Increase DEPAKOTE 500mg TID.  -  Dilaudid for rescue.).        Subjective:  Symptoms:  (Still with headache  Patient reported some Nausea and Vomiting.).      Temp:  [97.6 °F (36.4 °C)-98.2 °F (36.8 °C)] 98.2 °F (36.8 °C)  Heart Rate:  [55-63] 55  Resp:  [16-18] 18  BP: (105-128)/(59-76) 128/63  I/O last 3 completed shifts:  In: 2000 [I.V.:1000; IV Piggyback:1000]  Out: -         Objective:  General Appearance:  Comfortable.    Vital signs: (most recent): Blood pressure 128/63, pulse 55, temperature 98.2 °F (36.8 °C), temperature source Oral, resp. rate 18, height 69\" (175.3 cm), weight 153 lb (69.4 kg), SpO2 99 %.  Vital signs are normal.    HEENT: Normal HEENT exam.    Lungs:  Normal effort.    Chest: No chest wall tenderness.    Extremities: Normal range of motion.    Skin:  Warm.    Abdomen: Abdomen is soft.  There is no abdominal tenderness.     Pulses: Distal pulses are intact.      Neurologic Exam  Active Problems:    Chronic migraine without aura, intractable, with status migrainosus    "

## 2017-01-15 NOTE — SIGNIFICANT NOTE
Call Dr. Horton per patient request for something to help her sleep.  She continues to cry and state her head is killing her.  Patient is not using comfort measures recommend to assist with pain.  Will continue to monitor for changes in condition.  MD notified and new orders to be placed 07

## 2017-01-15 NOTE — PROGRESS NOTES
Discharge Planning Assessment   Dhaval     Patient Name: Susanna Camilo  MRN: [id number]  Today's Date: 1/15/2017    Admit Date: 1/14/2017          Discharge Needs Assessment       01/15/17 1021    Living Environment    Lives With spouse   Sony Newby    Living Arrangements house    Quality Of Family Relationships supportive    Able to Return to Prior Living Arrangements yes    Discharge Needs Assessment    Concerns To Be Addressed no discharge needs identified;denies needs/concerns at this time    Readmission Within The Last 30 Days no previous admission in last 30 days    Anticipated Changes Related to Illness none    Equipment Currently Used at Home none    Equipment Needed After Discharge none    Transportation Available car;family or friend will provide    Discharge Disposition still a patient            Discharge Plan       01/15/17 1022    Case Management/Social Work Plan    Plan Plans to return home with spouse Sony to drive her at discharge. She is indep with ADLs and is OOB here to bathroom and has no needs at this time.  Cm will follow.    Patient/Family In Agreement With Plan yes        Discharge Placement     No information found                Demographic Summary       01/15/17 1019    Referral Information    Admission Type inpatient    Arrived From admitted as an inpatient;home or self-care   Admitted for chronic migraine she is on Dilaudid PRN and D5NS@100/hr    Referral Source admission list    Primary Care Physician Information    Name Mabel Patterson            Functional Status       01/15/17 1020    Functional Status Current    Ambulation 0-->independent    Transferring 0-->independent    Toileting 0-->independent    Bathing 0-->independent    Dressing 0-->independent    Eating 0-->independent    Communication 0-->understands/communicates without difficulty    Swallowing (if score 2 or more for any item, consult Rehab Services) 0-->swallows foods/liquids without difficulty    Change in  Functional Status Since Onset of Current Illness/Injury no    Functional Status Prior    Ambulation 0-->independent    Transferring 0-->independent    Toileting 0-->independent    Bathing 0-->independent    Dressing 0-->independent    Eating 0-->independent    Communication 0-->understands/communicates without difficulty    Swallowing 0-->swallows foods/liquids without difficulty    IADL    Medications independent   [facility] deneid any issue paying for medications    Meal Preparation independent    Housekeeping independent    Laundry independent    Shopping independent    Oral Care independent    Activity Tolerance    Current Activity Limitations none    Employment/Financial    Financial Concerns none            Psychosocial     None            Abuse/Neglect     None            Legal       01/15/17 1021    Legal    Legal Comments Denied having or need for POA/AD            Substance Abuse     None            Patient Forms     None          Karley Mccullough RN

## 2017-01-16 LAB — VALPROATE SERPL-MCNC: 122.5 MCG/ML (ref 50–100)

## 2017-01-16 PROCEDURE — 25810000003 DEXTROSE-NACL PER 500 ML: Performed by: PSYCHIATRY & NEUROLOGY

## 2017-01-16 PROCEDURE — 80164 ASSAY DIPROPYLACETIC ACD TOT: CPT | Performed by: PSYCHIATRY & NEUROLOGY

## 2017-01-16 PROCEDURE — 25010000002 DIHYDROERGOTAMINE MESYLATE PER 1 MG: Performed by: PSYCHIATRY & NEUROLOGY

## 2017-01-16 PROCEDURE — 25010000002 PROMETHAZINE PER 50 MG: Performed by: PSYCHIATRY & NEUROLOGY

## 2017-01-16 PROCEDURE — 25010000002 HYDROMORPHONE PER 4 MG: Performed by: PSYCHIATRY & NEUROLOGY

## 2017-01-16 RX ORDER — PROMETHAZINE HYDROCHLORIDE 25 MG/ML
12.5 INJECTION, SOLUTION INTRAMUSCULAR; INTRAVENOUS ONCE
Status: COMPLETED | OUTPATIENT
Start: 2017-01-16 | End: 2017-01-16

## 2017-01-16 RX ORDER — TOPIRAMATE 25 MG/1
50 TABLET ORAL NIGHTLY
Status: DISCONTINUED | OUTPATIENT
Start: 2017-01-16 | End: 2017-01-17 | Stop reason: HOSPADM

## 2017-01-16 RX ORDER — LIDOCAINE HYDROCHLORIDE 10 MG/ML
20 INJECTION, SOLUTION INFILTRATION; PERINEURAL ONCE
Status: DISCONTINUED | OUTPATIENT
Start: 2017-01-16 | End: 2017-01-17 | Stop reason: HOSPADM

## 2017-01-16 RX ORDER — TEMAZEPAM 15 MG/1
30 CAPSULE ORAL NIGHTLY
Status: DISCONTINUED | OUTPATIENT
Start: 2017-01-16 | End: 2017-01-17 | Stop reason: HOSPADM

## 2017-01-16 RX ORDER — TOPIRAMATE 25 MG/1
25 TABLET ORAL DAILY
Status: DISCONTINUED | OUTPATIENT
Start: 2017-01-17 | End: 2017-01-17 | Stop reason: HOSPADM

## 2017-01-16 RX ADMIN — HYDROMORPHONE HYDROCHLORIDE 1 MG: 1 INJECTION, SOLUTION INTRAMUSCULAR; INTRAVENOUS; SUBCUTANEOUS at 11:22

## 2017-01-16 RX ADMIN — BUSPIRONE HYDROCHLORIDE 15 MG: 10 TABLET ORAL at 08:46

## 2017-01-16 RX ADMIN — DEXTROSE AND SODIUM CHLORIDE 100 ML/HR: 5; 900 INJECTION, SOLUTION INTRAVENOUS at 13:04

## 2017-01-16 RX ADMIN — BUSPIRONE HYDROCHLORIDE 15 MG: 10 TABLET ORAL at 16:47

## 2017-01-16 RX ADMIN — PROMETHAZINE HYDROCHLORIDE 12.5 MG: 25 INJECTION INTRAMUSCULAR; INTRAVENOUS at 08:46

## 2017-01-16 RX ADMIN — CARBIDOPA AND LEVODOPA 1 TABLET: 10; 100 TABLET ORAL at 21:29

## 2017-01-16 RX ADMIN — PROMETHAZINE HYDROCHLORIDE 12.5 MG: 25 INJECTION INTRAMUSCULAR; INTRAVENOUS at 17:06

## 2017-01-16 RX ADMIN — CARBIDOPA AND LEVODOPA 1 TABLET: 10; 100 TABLET ORAL at 16:47

## 2017-01-16 RX ADMIN — FLUTICASONE PROPIONATE 1 SPRAY: 50 SPRAY, METERED NASAL at 17:06

## 2017-01-16 RX ADMIN — DEXTROSE AND SODIUM CHLORIDE 100 ML/HR: 5; 900 INJECTION, SOLUTION INTRAVENOUS at 01:54

## 2017-01-16 RX ADMIN — DIHYDROERGOTAMINE MESYLATE 0.5 MG: 1 INJECTION, SOLUTION INTRAMUSCULAR; INTRAVENOUS; SUBCUTANEOUS at 13:04

## 2017-01-16 RX ADMIN — MONTELUKAST SODIUM 10 MG: 10 TABLET, COATED ORAL at 21:22

## 2017-01-16 RX ADMIN — PROMETHAZINE HYDROCHLORIDE 12.5 MG: 25 INJECTION INTRAMUSCULAR; INTRAVENOUS at 11:22

## 2017-01-16 RX ADMIN — TEMAZEPAM 30 MG: 15 CAPSULE ORAL at 23:01

## 2017-01-16 RX ADMIN — SERTRALINE HYDROCHLORIDE 150 MG: 50 TABLET, FILM COATED ORAL at 08:46

## 2017-01-16 RX ADMIN — DIHYDROERGOTAMINE MESYLATE 0.5 MG: 1 INJECTION, SOLUTION INTRAMUSCULAR; INTRAVENOUS; SUBCUTANEOUS at 21:30

## 2017-01-16 RX ADMIN — BUTALBITAL, ACETAMINOPHEN, AND CAFFEINE 1 TABLET: 50; 325; 40 TABLET ORAL at 17:06

## 2017-01-16 RX ADMIN — PROMETHAZINE HYDROCHLORIDE 12.5 MG: 25 INJECTION INTRAMUSCULAR; INTRAVENOUS at 01:53

## 2017-01-16 RX ADMIN — TOPIRAMATE 50 MG: 25 TABLET, FILM COATED ORAL at 21:29

## 2017-01-16 RX ADMIN — TOPIRAMATE 25 MG: 25 TABLET, FILM COATED ORAL at 08:47

## 2017-01-16 RX ADMIN — DIHYDROERGOTAMINE MESYLATE 0.5 MG: 1 INJECTION, SOLUTION INTRAMUSCULAR; INTRAVENOUS; SUBCUTANEOUS at 06:47

## 2017-01-16 RX ADMIN — FLUTICASONE PROPIONATE 1 SPRAY: 50 SPRAY, METERED NASAL at 08:46

## 2017-01-16 RX ADMIN — CETIRIZINE HYDROCHLORIDE 10 MG: 10 TABLET ORAL at 08:46

## 2017-01-16 RX ADMIN — HYDROMORPHONE HYDROCHLORIDE 1 MG: 1 INJECTION, SOLUTION INTRAMUSCULAR; INTRAVENOUS; SUBCUTANEOUS at 20:01

## 2017-01-16 RX ADMIN — BUSPIRONE HYDROCHLORIDE 15 MG: 10 TABLET ORAL at 21:22

## 2017-01-16 RX ADMIN — CARBIDOPA AND LEVODOPA 1 TABLET: 10; 100 TABLET ORAL at 08:46

## 2017-01-16 NOTE — PLAN OF CARE
Problem: Self-Care Deficit (Adult,Obstetrics,Pediatric)  Goal: Identify Related Risk Factors and Signs and Symptoms  Outcome: Ongoing (interventions implemented as appropriate)

## 2017-01-16 NOTE — SIGNIFICANT NOTE
"Patient calls out to the  requesting the nurse to come back to her room.  Upon arriving patient states I fell on top of the IV pole when I grabbed a hold of the pole, it fell and then I fell on top of it.  I felt a little dizzy and was headed back to the bed when I fell\"  .  Patient is sitting up in bed, IV pole with pump pump is standing  next to the bed working appropriately.  Patient has two plastic pieces in her hand from the IV pump.  She is stating, \"I'm so sorry I broke the pump\".  Questioned patient regarding the incident did a physical assessment.  Patient denies pain, or injury of any kind.  Patient has no marks on her persons. MD notified   "

## 2017-01-16 NOTE — PLAN OF CARE
Problem: Patient Care Overview (Adult)  Goal: Plan of Care Review  Outcome: Ongoing (interventions implemented as appropriate)    Problem: Pain, Acute (Adult)  Goal: Acceptable Pain Control/Comfort Level  Outcome: Ongoing (interventions implemented as appropriate)    Problem: Self-Care Deficit (Adult,Obstetrics,Pediatric)  Goal: Identify Related Risk Factors and Signs and Symptoms  Outcome: Ongoing (interventions implemented as appropriate)  Goal: Improved Ability to Perform BADL and IADL  Outcome: Ongoing (interventions implemented as appropriate)

## 2017-01-16 NOTE — PLAN OF CARE
Problem: Patient Care Overview (Adult)  Goal: Plan of Care Review  Outcome: Ongoing (interventions implemented as appropriate)    01/14/17 1335 01/15/17 2025   Coping/Psychosocial Response Interventions   Plan Of Care Reviewed With --  patient   Patient Care Overview   Progress progress toward functional goals as expected --        Goal: Adult Individualization and Mutuality  Outcome: Ongoing (interventions implemented as appropriate)    Problem: Pain, Acute (Adult)  Goal: Identify Related Risk Factors and Signs and Symptoms  Outcome: Outcome(s) achieved Date Met:  01/16/17  Goal: Acceptable Pain Control/Comfort Level  Outcome: Ongoing (interventions implemented as appropriate)

## 2017-01-16 NOTE — SIGNIFICANT NOTE
Monalisa Reyna notified Dr. Horton that patient and patient's  are requesting to consult Dr. Coates to provide care for her while she is here in the facility.   Dr. Coates contacted and he refused to see patient. Stating he has seen the patient in his office and he has been unable to help her.  He would prefer not to be involved in her care at this time.  Monalisa Reyna notified the patient and  that Dr. Coates has declined to consult on her case

## 2017-01-16 NOTE — SIGNIFICANT NOTE
Patient has called her  to come and stay with her. Patient has been instructed several times to call for assistance to get out of bed she refuses to do so.

## 2017-01-17 VITALS
HEIGHT: 69 IN | HEART RATE: 82 BPM | BODY MASS INDEX: 22.66 KG/M2 | OXYGEN SATURATION: 95 % | TEMPERATURE: 97.9 F | DIASTOLIC BLOOD PRESSURE: 74 MMHG | SYSTOLIC BLOOD PRESSURE: 117 MMHG | WEIGHT: 153 LBS | RESPIRATION RATE: 18 BRPM

## 2017-01-17 PROBLEM — G43.911 MIGRAINE WITH INTRACTABLE MIGRAINE, SO STATED, WITH STATUS MIGRAINOSUS: Status: ACTIVE | Noted: 2017-01-17

## 2017-01-17 LAB — VALPROATE SERPL-MCNC: 44.5 MCG/ML (ref 50–100)

## 2017-01-17 PROCEDURE — 25010000002 DIHYDROERGOTAMINE MESYLATE PER 1 MG: Performed by: PSYCHIATRY & NEUROLOGY

## 2017-01-17 PROCEDURE — 80164 ASSAY DIPROPYLACETIC ACD TOT: CPT | Performed by: PSYCHIATRY & NEUROLOGY

## 2017-01-17 PROCEDURE — 25010000002 PROMETHAZINE PER 50 MG: Performed by: PSYCHIATRY & NEUROLOGY

## 2017-01-17 PROCEDURE — C1751 CATH, INF, PER/CENT/MIDLINE: HCPCS

## 2017-01-17 PROCEDURE — 25010000002 HYDROMORPHONE PER 4 MG: Performed by: PSYCHIATRY & NEUROLOGY

## 2017-01-17 RX ORDER — SODIUM CHLORIDE 0.9 % (FLUSH) 0.9 %
10 SYRINGE (ML) INJECTION AS NEEDED
Status: DISCONTINUED | OUTPATIENT
Start: 2017-01-17 | End: 2017-01-17 | Stop reason: HOSPADM

## 2017-01-17 RX ORDER — PROMETHAZINE HYDROCHLORIDE 25 MG/1
25 TABLET ORAL EVERY 6 HOURS PRN
Status: DISCONTINUED | OUTPATIENT
Start: 2017-01-17 | End: 2017-01-17 | Stop reason: SDUPTHER

## 2017-01-17 RX ORDER — PROMETHAZINE HYDROCHLORIDE 25 MG/ML
12.5 INJECTION, SOLUTION INTRAMUSCULAR; INTRAVENOUS EVERY 6 HOURS PRN
Status: DISCONTINUED | OUTPATIENT
Start: 2017-01-17 | End: 2017-01-17

## 2017-01-17 RX ORDER — PROMETHAZINE HYDROCHLORIDE 25 MG/1
25 TABLET ORAL EVERY 6 HOURS PRN
Status: DISCONTINUED | OUTPATIENT
Start: 2017-01-17 | End: 2017-01-17 | Stop reason: HOSPADM

## 2017-01-17 RX ORDER — LIDOCAINE HYDROCHLORIDE 10 MG/ML
20 INJECTION, SOLUTION INFILTRATION; PERINEURAL ONCE
Status: COMPLETED | OUTPATIENT
Start: 2017-01-17 | End: 2017-01-17

## 2017-01-17 RX ORDER — DIHYDROERGOTAMINE MESYLATE 1 MG/ML
0.5 INJECTION, SOLUTION INTRAMUSCULAR; INTRAVENOUS; SUBCUTANEOUS ONCE
Status: COMPLETED | OUTPATIENT
Start: 2017-01-17 | End: 2017-01-17

## 2017-01-17 RX ORDER — SODIUM CHLORIDE 0.9 % (FLUSH) 0.9 %
10 SYRINGE (ML) INJECTION EVERY 12 HOURS SCHEDULED
Status: DISCONTINUED | OUTPATIENT
Start: 2017-01-17 | End: 2017-01-17 | Stop reason: HOSPADM

## 2017-01-17 RX ADMIN — CETIRIZINE HYDROCHLORIDE 10 MG: 10 TABLET ORAL at 08:58

## 2017-01-17 RX ADMIN — PROMETHAZINE HYDROCHLORIDE 12.5 MG: 25 INJECTION INTRAMUSCULAR; INTRAVENOUS at 08:57

## 2017-01-17 RX ADMIN — DIHYDROERGOTAMINE MESYLATE 0.5 MG: 1 INJECTION, SOLUTION INTRAMUSCULAR; INTRAVENOUS; SUBCUTANEOUS at 05:16

## 2017-01-17 RX ADMIN — HYDROMORPHONE HYDROCHLORIDE 1 MG: 1 INJECTION, SOLUTION INTRAMUSCULAR; INTRAVENOUS; SUBCUTANEOUS at 13:34

## 2017-01-17 RX ADMIN — CARBIDOPA AND LEVODOPA 1 TABLET: 10; 100 TABLET ORAL at 09:00

## 2017-01-17 RX ADMIN — FLUTICASONE PROPIONATE 1 SPRAY: 50 SPRAY, METERED NASAL at 09:00

## 2017-01-17 RX ADMIN — FLUTICASONE PROPIONATE 1 SPRAY: 50 SPRAY, METERED NASAL at 17:13

## 2017-01-17 RX ADMIN — TOPIRAMATE 25 MG: 25 TABLET, FILM COATED ORAL at 08:58

## 2017-01-17 RX ADMIN — SODIUM CHLORIDE, PRESERVATIVE FREE 10 ML: 5 INJECTION INTRAVENOUS at 09:01

## 2017-01-17 RX ADMIN — HYDROMORPHONE HYDROCHLORIDE 1 MG: 1 INJECTION, SOLUTION INTRAMUSCULAR; INTRAVENOUS; SUBCUTANEOUS at 11:05

## 2017-01-17 RX ADMIN — LIDOCAINE HYDROCHLORIDE 0.5 ML: 10 INJECTION, SOLUTION INFILTRATION; PERINEURAL at 08:19

## 2017-01-17 RX ADMIN — DIHYDROERGOTAMINE MESYLATE 0.5 MG: 1 INJECTION, SOLUTION INTRAMUSCULAR; INTRAVENOUS; SUBCUTANEOUS at 13:22

## 2017-01-17 RX ADMIN — BUSPIRONE HYDROCHLORIDE 15 MG: 10 TABLET ORAL at 17:11

## 2017-01-17 RX ADMIN — SODIUM CHLORIDE, PRESERVATIVE FREE 10 ML: 5 INJECTION INTRAVENOUS at 13:34

## 2017-01-17 RX ADMIN — SERTRALINE HYDROCHLORIDE 150 MG: 50 TABLET, FILM COATED ORAL at 08:59

## 2017-01-17 RX ADMIN — CARBIDOPA AND LEVODOPA 1 TABLET: 10; 100 TABLET ORAL at 17:11

## 2017-01-17 RX ADMIN — HYDROMORPHONE HYDROCHLORIDE 1 MG: 1 INJECTION, SOLUTION INTRAMUSCULAR; INTRAVENOUS; SUBCUTANEOUS at 17:02

## 2017-01-17 RX ADMIN — BUSPIRONE HYDROCHLORIDE 15 MG: 10 TABLET ORAL at 08:59

## 2017-01-17 NOTE — PLAN OF CARE
Problem: Pain, Acute (Adult)  Goal: Acceptable Pain Control/Comfort Level  Outcome: Outcome(s) achieved Date Met:  01/17/17

## 2017-01-17 NOTE — PROGRESS NOTES
"   Assessment:  (Status migraine).     Plan:   (-  Continue DHE. To complete 3 days.  -  Hold DEPAKOTE till levels come down.  -  Increase TOPAMAX 25mg q AM and 50mg qHS.).        Subjective:  Symptoms:  (Headache somewhat better with DILAUDID.  Denies any side effects from current medication.).      Temp:  [97.9 °F (36.6 °C)-98.1 °F (36.7 °C)] 98.1 °F (36.7 °C)  Heart Rate:  [70-78] 78  Resp:  [18-20] 20  BP: (120-147)/(66-77) 120/66  I/O last 3 completed shifts:  In: 1560 [P.O.:1560]  Out: 8 [Urine:8]        Objective:  General Appearance:  Comfortable.    Vital signs: (most recent): Blood pressure 120/66, pulse 78, temperature 98.1 °F (36.7 °C), temperature source Oral, resp. rate 20, height 69\" (175.3 cm), weight 153 lb (69.4 kg), SpO2 94 %.  Vital signs are normal.    HEENT: Normal HEENT exam.    Lungs:  Normal effort.    Chest: No chest wall tenderness.    Extremities: Normal range of motion.    Skin:  Warm.    Abdomen: Abdomen is soft.  There is no abdominal tenderness.     Pulses: Distal pulses are intact.      Neurologic Exam  Active Problems:    Chronic migraine without aura, intractable, with status migrainosus    "

## 2017-01-17 NOTE — PLAN OF CARE
Problem: Patient Care Overview (Adult)  Goal: Plan of Care Review  Outcome: Outcome(s) achieved Date Met:  01/17/17  Goal: Adult Individualization and Mutuality  Outcome: Outcome(s) achieved Date Met:  01/17/17

## 2017-01-17 NOTE — H&P
Patient Care Team:  MIGDALIA Gooden as PCP - General (Family Medicine)    Chief complaint: headache    Subjective     HPI Comments: This is a 41 year-old   female with chronic headache. She failed outpatient therapy.  She went to the emergency room for severe headache  No response to medication given to the emergency room.    Known patient with chronic migraine was have given Botox 12/7/16 with some response.  She has been taking Depakote and Topamax for headache prophylaxis to no avail. she feels that Botox help her after it was given in December hat started having more and more headache early this year.  She has been getting injections to outpatient clinic.    Note the she has degenreative disease cervical spine which also trigger for her headache.  Patient also has bipolar disorder and fibromyalgia.      Patient also complain of severe nausea along with this headache that she described as severe throbbing pain felt all over her head.    Headache    Associated symptoms include nausea.       Review of Systems   Constitutional: Negative.    HENT:        Severe headache   Respiratory: Negative.    Cardiovascular: Negative.    Gastrointestinal: Positive for nausea.   Genitourinary: Negative.    Musculoskeletal: Negative.    Neurological: Positive for headaches.   Psychiatric/Behavioral: Positive for sleep disturbance. The patient is nervous/anxious.         Past Medical History   Diagnosis Date   • Abdominal pain    • Abdominal swelling    • Bipolar 1 disorder    • Brain tumor      R Frontal Lobe per pt   • Constipation    • Diarrhea    • Fibromyalgia    • IBS (irritable bowel syndrome)    • Migraine    • Nausea & vomiting    • PONV (postoperative nausea and vomiting)    • PTSD (post-traumatic stress disorder)    • Rectal bleeding      Past Surgical History   Procedure Laterality Date   • Hysterectomy     • Appendectomy     • Shoulder surgery       3 times   • Knee surgery     • Endoscopy N/A  6/30/2016     Procedure: ESOPHAGOGASTRODUODENOSCOPY WITH BIOPSY  CPTCODE:07701;  Surgeon: Jose Antonio Belle III, MD;  Location:  [facility] OR;  Service:    • Colonoscopy N/A 6/30/2016     Procedure: COLONOSCOPY  CPTCODE:72673;  Surgeon: Jose Antonio Belle III, MD;  Location:  [facility] OR;  Service:    • Colonoscopy N/A 7/7/2016     Procedure: COLONOSCOPY (01548) CPT;  Surgeon: Jose Antonio Belle III, MD;  Location:  [facility] OR;  Service:    • Hemorrhoidectomy N/A 7/28/2016     Procedure: HEMORRHOID STAPLING;  Surgeon: Kael Lopez MD;  Location:  [facility] OR;  Service:    • Anal scope N/A 7/28/2016     Procedure: ANAL SCOPE;  Surgeon: Kael Lopez MD;  [facility]: [facility];  Service:      Family History   Problem Relation Age of Onset   • Crohn's disease Other    • Hypertension Other    • Diabetes Other    • Irritable bowel syndrome Other      Social History   Substance Use Topics   • Smoking status: Former Smoker     Types: Cigarettes     Quit date: 11/1/2015   • Smokeless tobacco: Never Used   • Alcohol use No     Prescriptions Prior to Admission   Medication Sig Dispense Refill Last Dose   • azelastine (ASTEPRO) 0.15 % solution nasal spray 2 sprays into each nostril 2 (Two) Times a Day. 30 mL 0 1/13/2017 at 2000   • busPIRone (BUSPAR) 15 MG tablet Take 15 mg by mouth 3 (three) times a day      1/13/2017 at 2000   • butalbital-acetaminophen-caffeine (FIORICET) -40 MG per tablet Take 1-2 tablets by mouth Every 6 (Six) Hours As Needed for headaches or migraine. 20 tablet 0 Past Week at Unknown time   • carbidopa-levodopa (SINEMET)  MG per tablet Take 1 tablet by mouth 3 (Three) Times a Day.   1/13/2017 at 2000   • cetirizine (zyrTEC) 10 MG tablet Take 1 tablet by mouth Daily for 30 days. 30 tablet 0 1/13/2017 at 2000   • divalproex (DEPAKOTE) 500 MG DR tablet Take 500 mg by mouth 2 (Two) Times a Day.   1/13/2017 at 2000   • fluticasone (FLONASE) 50 MCG/ACT nasal spray 1 spray into each nostril 2 (Two) Times  a Day for 30 days. 1 bottle 0 1/13/2017 at 2000   • montelukast (SINGULAIR) 10 MG tablet Take 1 tablet by mouth Every Night for 30 days. 30 tablet 0 1/13/2017 at 2000   • pantoprazole (PROTONIX) 40 MG EC tablet Take 40 mg by mouth Daily As Needed.   Past Week at Unknown time   • promethazine (PHENERGAN) 25 MG tablet Take 25 mg by mouth 2 (Two) Times a Day As Needed for nausea or vomiting.   1/13/2017 at 2000   • sertraline (ZOLOFT) 100 MG tablet Take 150 mg by mouth daily.   1/13/2017 at am   • SUMAtriptan (IMITREX) 6 MG/0.5ML solution injection Inject 6 mg under the skin 1 (One) Time As Needed for migraine.   1/13/2017 at Unknown time   • topiramate (TOPAMAX) 25 MG tablet Take 25 mg by mouth 2 (Two) Times a Day.   1/13/2017 at 2000   • famotidine (PEPCID) 40 MG tablet Take 40 mg by mouth 2 (Two) Times a Day As Needed for heartburn.   Unknown at Unknown time     Allergies:  Ativan [lorazepam]; Sulfa antibiotics; Compazine [prochlorperazine edisylate]; Demerol [meperidine]; Droperidol; Prochlorperazine; and Toradol [ketorolac tromethamine]    Objective      Vital Signs  Temp:  [98.1 °F (36.7 °C)] 98.1 °F (36.7 °C)  Heart Rate:  [53-78] 53  Resp:  [20] 20  BP: (120-122)/(66-67) 122/67    Physical Exam   Constitutional: She is oriented to person, place, and time. She appears well-developed.   HENT:   Head: Normocephalic.   Eyes: Pupils are equal, round, and reactive to light.   Neck: Normal range of motion.   Cardiovascular: Normal rate.    Pulmonary/Chest: Effort normal.   Abdominal: Soft.   Musculoskeletal: Normal range of motion.   Neurological: She is alert and oriented to person, place, and time. She has normal reflexes.   Skin: Skin is warm.   Nursing note and vitals reviewed.      Results Review:   I reviewed the patient's new clinical results.      Assessment/Plan     Active Problems:    Chronic migraine without aura, intractable, with status migrainosus      Assessment:  (Status migraine.).     Plan:   (-   With admitted for DHE therapy.  Toñito's Protocol.  -  CheckVvalproic acid level).       I discussed the patients findings and my recommendations with patient    Casper Horton MD  01/17/17  1:06 PM

## 2017-01-17 NOTE — PROGRESS NOTES
"   Assessment:  (Status migraine).     Plan:   (-  Patient has been referred to [facility] for headache management.  -  She is to continue DEPAKOTE and TOPAMAX for headache prophylaxis.  -  She is aware that I will not be able to help her with narcotic shots from now on because she was positive for marijuana on for one o her emergency room visits Urine drug screen.  ).        Subjective:  Symptoms:  (Still with headache but she reported it is tolerable with a pain shot and DHE.).      Temp:  [98.1 °F (36.7 °C)] 98.1 °F (36.7 °C)  Heart Rate:  [53-78] 53  Resp:  [20] 20  BP: (120-122)/(66-67) 122/67  I/O last 3 completed shifts:  In: 1440 [P.O.:1440]  Out: 8 [Urine:8]  I/O this shift:  In: 360 [P.O.:360]  Out: -      Objective:  General Appearance:  Comfortable.    Vital signs: (most recent): Blood pressure 122/67, pulse 53, temperature 98.1 °F (36.7 °C), temperature source Oral, resp. rate 20, height 69\" (175.3 cm), weight 153 lb (69.4 kg), SpO2 93 %.  Vital signs are normal.    HEENT: Normal HEENT exam.    Lungs:  Normal effort.    Chest: No chest wall tenderness.    Extremities: Normal range of motion.    Skin:  Warm.    Abdomen: Abdomen is soft.  There is no abdominal tenderness.     Pulses: Distal pulses are intact.      Neurologic Exam     Mental Status   Oriented to person, place, and time.   Level of consciousness: alert  Knowledge: good.   Able to name object.     Cranial Nerves     CN II   Visual fields full to confrontation.     CN III, IV, VI   Pupils are equal, round, and reactive to light.    CN V   Facial sensation intact.     CN VII   Facial expression full, symmetric.     CN VIII   CN VIII normal.     CN IX, X   CN IX normal.     CN XI   CN XI normal.     CN XII   CN XII normal.     Motor Exam   Muscle bulk: normal  Overall muscle tone: normal    Strength   Right neck flexion: 5/5  Left neck flexion: 5/5  Right neck extension: 5/5  Left neck extension: 5/5  Right deltoid: 5/5  Left " deltoid: 5/5  Right biceps: 5/5  Left biceps: 5/5  Right triceps: 5/5  Left triceps: 5/5  Right wrist flexion: 5/5  Left wrist flexion: 5/  Right wrist extension: 5/  Left wrist extension:   Right interossei: 5  Left interossei:   Right abdominals: 5/  Left abdominals: 5/  Right iliopsoas:   Left iliopsoas:   Right quadriceps:   Left quadriceps:   Right hamstrin/5  Left hamstrin/5  Right glutei:   Left glutei:   Right anterior tibial:   Left anterior tibial:   Right posterior tibial:   Left posterior tibial:   Right peroneal:   Left peroneal:   Right gastroc:   Left gastroc:     Sensory Exam   Light touch normal.   Right arm light touch: normal  Left arm light touch: normal  Vibration normal.   Right arm vibration: normal  Left arm vibration: normal    Gait, Coordination, and Reflexes     Gait  Gait: normal    Coordination   Romberg: negative  Finger to nose coordination: normal  Heel to shin coordination: normal  Tandem walking coordination: normal    Tremor   Resting tremor: absent  Intention tremor: absent  Action tremor: absent    Active Problems:    Chronic migraine without aura, intractable, with status migrainosus

## 2017-01-17 NOTE — PLAN OF CARE
Problem: Self-Care Deficit (Adult,Obstetrics,Pediatric)  Goal: Identify Related Risk Factors and Signs and Symptoms  Outcome: Outcome(s) achieved Date Met:  01/17/17

## 2017-01-17 NOTE — PLAN OF CARE
Problem: Patient Care Overview (Adult)  Goal: Plan of Care Review  Outcome: Ongoing (interventions implemented as appropriate)  Goal: Adult Individualization and Mutuality  Outcome: Ongoing (interventions implemented as appropriate)  Goal: Discharge Needs Assessment  Outcome: Ongoing (interventions implemented as appropriate)    Problem: Pain, Acute (Adult)  Goal: Acceptable Pain Control/Comfort Level  Outcome: Ongoing (interventions implemented as appropriate)    Problem: Self-Care Deficit (Adult,Obstetrics,Pediatric)  Goal: Identify Related Risk Factors and Signs and Symptoms  Outcome: Ongoing (interventions implemented as appropriate)  Goal: Improved Ability to Perform BADL and IADL  Outcome: Ongoing (interventions implemented as appropriate)

## 2017-01-17 NOTE — PLAN OF CARE
Problem: Patient Care Overview (Adult)  Goal: Plan of Care Review  Outcome: Ongoing (interventions implemented as appropriate)    01/17/17 0253   Coping/Psychosocial Response Interventions   Plan Of Care Reviewed With patient   Patient Care Overview   Progress progress toward functional goals as expected         Problem: Pain, Acute (Adult)  Goal: Acceptable Pain Control/Comfort Level  Outcome: Ongoing (interventions implemented as appropriate)    01/17/17 0253   Pain, Acute (Adult)   Acceptable Pain Control/Comfort Level making progress toward outcome         Problem: Self-Care Deficit (Adult,Obstetrics,Pediatric)  Goal: Identify Related Risk Factors and Signs and Symptoms  Outcome: Ongoing (interventions implemented as appropriate)    01/17/17 0253   Self-Care Deficit   Self-Care Deficit: Related Risk Factors pain/discomfort   Signs and Symptoms (Self-Care Deficit) other (see comments)  (no signs/symptoms of self-care deficit)       Goal: Improved Ability to Perform BADL and IADL  Outcome: Ongoing (interventions implemented as appropriate)    01/17/17 0253   Self-Care Deficit (Adult,Obstetrics,Pediatric)   Improved Ability to Perform BADL and IADL making progress toward outcome

## 2017-01-17 NOTE — PLAN OF CARE
Problem: Self-Care Deficit (Adult,Obstetrics,Pediatric)  Goal: Improved Ability to Perform BADL and IADL  Outcome: Outcome(s) achieved Date Met:  01/17/17

## 2017-01-21 ENCOUNTER — HOSPITAL ENCOUNTER (EMERGENCY)
Facility: HOSPITAL | Age: 42
Discharge: HOME | End: 2017-01-21
Attending: EMERGENCY MEDICINE

## 2017-01-21 ENCOUNTER — APPOINTMENT (OUTPATIENT)
Dept: ULTRASOUND IMAGING | Facility: HOSPITAL | Age: 42
End: 2017-01-21

## 2017-01-21 VITALS
HEIGHT: 69 IN | OXYGEN SATURATION: 100 % | WEIGHT: 153 LBS | BODY MASS INDEX: 22.66 KG/M2 | HEART RATE: 78 BPM | TEMPERATURE: 97.4 F | RESPIRATION RATE: 16 BRPM | SYSTOLIC BLOOD PRESSURE: 107 MMHG | DIASTOLIC BLOOD PRESSURE: 73 MMHG

## 2017-01-21 DIAGNOSIS — N39.0 ACUTE UTI (URINARY TRACT INFECTION): Primary | ICD-10-CM

## 2017-01-21 LAB
ALBUMIN SERPL-MCNC: 4.5 G/DL (ref 3.5–5)
ALBUMIN/GLOB SERPL: 1.4 G/DL (ref 1.5–2.5)
ALP SERPL-CCNC: 64 U/L (ref 46–116)
ALT SERPL W P-5'-P-CCNC: 21 U/L (ref 10–36)
ANION GAP SERPL CALCULATED.3IONS-SCNC: 6.6 MMOL/L (ref 3.6–11.2)
AST SERPL-CCNC: 51 U/L (ref 10–30)
BACTERIA UR QL AUTO: ABNORMAL /HPF
BASOPHILS # BLD AUTO: 0 10*3/MM3 (ref 0–0.3)
BASOPHILS NFR BLD AUTO: 0 % (ref 0–2)
BILIRUB SERPL-MCNC: 0.7 MG/DL (ref 0.2–1.8)
BILIRUB UR QL STRIP: NEGATIVE
BUN BLD-MCNC: 6 MG/DL (ref 7–21)
BUN/CREAT SERPL: 9.5 (ref 7–25)
CALCIUM SPEC-SCNC: 9.4 MG/DL (ref 7.7–10)
CHLORIDE SERPL-SCNC: 107 MMOL/L (ref 99–112)
CLARITY UR: ABNORMAL
CO2 SERPL-SCNC: 31.4 MMOL/L (ref 24.3–31.9)
COLOR UR: ABNORMAL
CREAT BLD-MCNC: 0.63 MG/DL (ref 0.43–1.29)
DEPRECATED RDW RBC AUTO: 47.7 FL (ref 37–54)
EOSINOPHIL # BLD AUTO: 0.09 10*3/MM3 (ref 0–0.7)
EOSINOPHIL NFR BLD AUTO: 1.5 % (ref 0–5)
ERYTHROCYTE [DISTWIDTH] IN BLOOD BY AUTOMATED COUNT: 13.9 % (ref 11.5–14.5)
GFR SERPL CREATININE-BSD FRML MDRD: 104 ML/MIN/1.73
GLOBULIN UR ELPH-MCNC: 3.3 GM/DL
GLUCOSE BLD-MCNC: 90 MG/DL (ref 70–110)
GLUCOSE UR STRIP-MCNC: NEGATIVE MG/DL
HAV IGM SERPL QL IA: ABNORMAL
HBV CORE IGM SERPL QL IA: ABNORMAL
HBV SURFACE AG SERPL QL IA: ABNORMAL
HCT VFR BLD AUTO: 43.2 % (ref 37–47)
HCV AB SER DONR QL: REACTIVE
HGB BLD-MCNC: 14.7 G/DL (ref 12–16)
HGB UR QL STRIP.AUTO: ABNORMAL
IMM GRANULOCYTES # BLD: 0.01 10*3/MM3 (ref 0–0.03)
IMM GRANULOCYTES NFR BLD: 0.2 % (ref 0–0.5)
KETONES UR QL STRIP: NEGATIVE
LEUKOCYTE ESTERASE UR QL STRIP.AUTO: ABNORMAL
LYMPHOCYTES # BLD AUTO: 2.51 10*3/MM3 (ref 1–3)
LYMPHOCYTES NFR BLD AUTO: 42.1 % (ref 21–51)
MCH RBC QN AUTO: 32.2 PG (ref 27–33)
MCHC RBC AUTO-ENTMCNC: 34 G/DL (ref 33–37)
MCV RBC AUTO: 94.5 FL (ref 80–94)
MONOCYTES # BLD AUTO: 0.49 10*3/MM3 (ref 0.1–0.9)
MONOCYTES NFR BLD AUTO: 8.2 % (ref 0–10)
NEUTROPHILS # BLD AUTO: 2.86 10*3/MM3 (ref 1.4–6.5)
NEUTROPHILS NFR BLD AUTO: 48 % (ref 30–70)
NITRITE UR QL STRIP: NEGATIVE
OSMOLALITY SERPL CALC.SUM OF ELEC: 285.8 MOSM/KG (ref 273–305)
PH UR STRIP.AUTO: 8.5 [PH] (ref 5–8)
PLATELET # BLD AUTO: 114 10*3/MM3 (ref 130–400)
PMV BLD AUTO: 10.7 FL (ref 6–10)
POTASSIUM BLD-SCNC: 4.1 MMOL/L (ref 3.5–5.3)
PROT SERPL-MCNC: 7.8 G/DL (ref 6–8)
PROT UR QL STRIP: ABNORMAL
RBC # BLD AUTO: 4.57 10*6/MM3 (ref 4.2–5.4)
RBC # UR: ABNORMAL /HPF
REF LAB TEST METHOD: ABNORMAL
SODIUM BLD-SCNC: 145 MMOL/L (ref 135–153)
SP GR UR STRIP: 1.01 (ref 1–1.03)
SQUAMOUS #/AREA URNS HPF: ABNORMAL /HPF
UROBILINOGEN UR QL STRIP: ABNORMAL
WBC NRBC COR # BLD: 5.96 10*3/MM3 (ref 4.5–12.5)
WBC UR QL AUTO: ABNORMAL /HPF

## 2017-01-21 PROCEDURE — 80074 ACUTE HEPATITIS PANEL: CPT | Performed by: PHYSICIAN ASSISTANT

## 2017-01-21 PROCEDURE — 96372 THER/PROPH/DIAG INJ SC/IM: CPT

## 2017-01-21 PROCEDURE — 85025 COMPLETE CBC W/AUTO DIFF WBC: CPT | Performed by: PHYSICIAN ASSISTANT

## 2017-01-21 PROCEDURE — 80053 COMPREHEN METABOLIC PANEL: CPT | Performed by: PHYSICIAN ASSISTANT

## 2017-01-21 PROCEDURE — 76770 US EXAM ABDO BACK WALL COMP: CPT | Performed by: RADIOLOGY

## 2017-01-21 PROCEDURE — 36415 COLL VENOUS BLD VENIPUNCTURE: CPT

## 2017-01-21 PROCEDURE — 81001 URINALYSIS AUTO W/SCOPE: CPT | Performed by: PHYSICIAN ASSISTANT

## 2017-01-21 PROCEDURE — 76775 US EXAM ABDO BACK WALL LIM: CPT

## 2017-01-21 PROCEDURE — 99284 EMERGENCY DEPT VISIT MOD MDM: CPT

## 2017-01-21 PROCEDURE — 25010000002 CEFTRIAXONE PER 250 MG: Performed by: EMERGENCY MEDICINE

## 2017-01-21 PROCEDURE — 87086 URINE CULTURE/COLONY COUNT: CPT | Performed by: PHYSICIAN ASSISTANT

## 2017-01-21 RX ORDER — PHENAZOPYRIDINE HYDROCHLORIDE 200 MG/1
200 TABLET, FILM COATED ORAL 3 TIMES DAILY PRN
Qty: 12 TABLET | Refills: 0 | Status: SHIPPED | OUTPATIENT
Start: 2017-01-21 | End: 2017-07-27

## 2017-01-21 RX ORDER — NITROFURANTOIN 25; 75 MG/1; MG/1
100 CAPSULE ORAL 2 TIMES DAILY
Qty: 14 CAPSULE | Refills: 0 | Status: SHIPPED | OUTPATIENT
Start: 2017-01-21 | End: 2017-04-27

## 2017-01-21 RX ORDER — HYDROCODONE BITARTRATE AND ACETAMINOPHEN 5; 325 MG/1; MG/1
1 TABLET ORAL ONCE
Status: COMPLETED | OUTPATIENT
Start: 2017-01-21 | End: 2017-01-21

## 2017-01-21 RX ORDER — PHENAZOPYRIDINE HYDROCHLORIDE 200 MG/1
200 TABLET, FILM COATED ORAL ONCE
Status: COMPLETED | OUTPATIENT
Start: 2017-01-21 | End: 2017-01-21

## 2017-01-21 RX ORDER — CEFTRIAXONE 1 G/1
1000 INJECTION, POWDER, FOR SOLUTION INTRAMUSCULAR; INTRAVENOUS ONCE
Status: COMPLETED | OUTPATIENT
Start: 2017-01-21 | End: 2017-01-21

## 2017-01-21 RX ORDER — LIDOCAINE HYDROCHLORIDE 10 MG/ML
2.1 INJECTION, SOLUTION EPIDURAL; INFILTRATION; INTRACAUDAL; PERINEURAL ONCE
Status: COMPLETED | OUTPATIENT
Start: 2017-01-21 | End: 2017-01-21

## 2017-01-21 RX ADMIN — LIDOCAINE HYDROCHLORIDE 2.1 ML: 10 INJECTION, SOLUTION EPIDURAL; INFILTRATION; INTRACAUDAL; PERINEURAL at 15:53

## 2017-01-21 RX ADMIN — HYDROCODONE BITARTRATE AND ACETAMINOPHEN 1 TABLET: 5; 325 TABLET ORAL at 16:05

## 2017-01-21 RX ADMIN — PHENAZOPYRIDINE HYDROCHLORIDE 200 MG: 200 TABLET ORAL at 15:52

## 2017-01-21 RX ADMIN — CEFTRIAXONE SODIUM 1000 MG: 1 INJECTION, POWDER, FOR SOLUTION INTRAMUSCULAR; INTRAVENOUS at 15:52

## 2017-01-21 NOTE — ED PROVIDER NOTES
Subjective   Patient is a 41 y.o. female presenting with flank pain.   Flank Pain   Pain location:  R flank  Pain quality: throbbing    Pain radiates to:  RLQ  Pain severity:  Moderate  Onset quality:  Gradual  Duration:  2 days  Timing:  Constant  Progression:  Worsening  Chronicity:  New  Context: not alcohol use, not awakening from sleep, not diet changes, not eating, not laxative use, not medication withdrawal, not previous surgeries, not recent illness, not recent sexual activity, not recent travel, not retching, not sick contacts, not suspicious food intake and not trauma    Relieved by:  Nothing  Worsened by:  Nothing  Ineffective treatments:  None tried  Associated symptoms: dysuria and hematuria    Associated symptoms: no anorexia, no belching, no chest pain, no chills, no constipation, no cough, no diarrhea, no fatigue, no fever, no flatus, no hematemesis, no hematochezia, no melena, no nausea, no shortness of breath, no sore throat, no vaginal bleeding, no vaginal discharge and no vomiting    Risk factors: no alcohol abuse, no aspirin use, not elderly, has not had multiple surgeries, no NSAID use, not obese, not pregnant and no recent hospitalization        Review of Systems   Constitutional: Negative for chills, fatigue and fever.   HENT: Negative for sore throat.    Respiratory: Negative for cough and shortness of breath.    Cardiovascular: Negative for chest pain.   Gastrointestinal: Negative for anorexia, constipation, diarrhea, flatus, hematemesis, hematochezia, melena, nausea and vomiting.   Genitourinary: Positive for dysuria, flank pain and hematuria. Negative for vaginal bleeding and vaginal discharge.   All other systems reviewed and are negative.      Past Medical History   Diagnosis Date   • Abdominal pain    • Abdominal swelling    • Bipolar 1 disorder    • Brain tumor      R Frontal Lobe per pt   • Constipation    • Diarrhea    • Fibromyalgia    • IBS (irritable bowel syndrome)    • Migraine     • Nausea & vomiting    • PONV (postoperative nausea and vomiting)    • PTSD (post-traumatic stress disorder)    • Rectal bleeding        Allergies   Allergen Reactions   • Ativan [Lorazepam] Hallucinations     confusion   • Sulfa Antibiotics Shortness Of Breath and Swelling   • Compazine [Prochlorperazine Edisylate] Hives   • Demerol [Meperidine] Hives   • Droperidol    • Prochlorperazine    • Toradol [Ketorolac Tromethamine] Hives and Itching       Past Surgical History   Procedure Laterality Date   • Hysterectomy     • Appendectomy     • Shoulder surgery       3 times   • Knee surgery     • Endoscopy N/A 6/30/2016     Procedure: ESOPHAGOGASTRODUODENOSCOPY WITH BIOPSY  CPTCODE:73819;  Surgeon: Jose Antonio Belle III, MD;  Location: [facility] OR;  Service:    • Colonoscopy N/A 6/30/2016     Procedure: COLONOSCOPY  CPTCODE:13996;  Surgeon: Jose Antonio Belle III, MD;  Location: [facility] OR;  Service:    • Colonoscopy N/A 7/7/2016     Procedure: COLONOSCOPY (38636) CPT;  Surgeon: Jose Antonio Belle III, MD;  Location: [facility] OR;  Service:    • Hemorrhoidectomy N/A 7/28/2016     Procedure: HEMORRHOID STAPLING;  Surgeon: Kael Lopez MD;  Location: [facility] OR;  Service:    • Anal scope N/A 7/28/2016     Procedure: ANAL SCOPE;  Surgeon: Kael Lopez MD;  Location: [facility] OR;  Service:        Family History   Problem Relation Age of Onset   • Crohn's disease Other    • Hypertension Other    • Diabetes Other    • Irritable bowel syndrome Other        Social History     Social History   • Marital status:      Spouse name: N/A   • Number of children: N/A   • Years of education: N/A     Social History Main Topics   • Smoking status: Former Smoker     Types: Cigarettes     Quit date: 11/1/2015   • Smokeless tobacco: Never Used   • Alcohol use No   • Drug use: Yes     Special: Marijuana   • Sexual activity: Defer     Other Topics Concern   • None     Social History Narrative           Objective   Physical Exam    Constitutional: She is oriented to person, place, and time. Vital signs are normal. She appears well-developed and well-nourished.   HENT:   Head: Normocephalic and atraumatic.   Right Ear: External ear normal.   Left Ear: External ear normal.   Nose: Nose normal.   Mouth/Throat: Oropharynx is clear and moist.   Eyes: Conjunctivae and EOM are normal. Pupils are equal, round, and reactive to light.   Neck: Normal range of motion.   Cardiovascular: Normal rate, regular rhythm and intact distal pulses.    Pulmonary/Chest: Effort normal and breath sounds normal.   Abdominal: Soft. Normal appearance. There is tenderness. There is CVA tenderness (right).       Musculoskeletal: Normal range of motion.   Neurological: She is alert and oriented to person, place, and time. GCS eye subscore is 4. GCS verbal subscore is 5. GCS motor subscore is 6.   Skin: Skin is warm.   Nursing note and vitals reviewed.      Procedures         ED Course  ED Course                  MDM  Number of Diagnoses or Management Options  Acute UTI (urinary tract infection): established and worsening     Amount and/or Complexity of Data Reviewed  Clinical lab tests: reviewed and ordered  Tests in the radiology section of CPT®: ordered and reviewed  Tests in the medicine section of CPT®: ordered and reviewed    Risk of Complications, Morbidity, and/or Mortality  Presenting problems: moderate  Diagnostic procedures: moderate  Management options: moderate    Patient Progress  Patient progress: stable      Final diagnoses:   Acute UTI (urinary tract infection)            TAMI Arrington  01/21/17 [id number]

## 2017-01-21 NOTE — ED NOTES
Pt states that she has a strong history of bladder infections and kidney stones pt states that she noticed blood in urine last night pt states that she keeps UTI or kidney stones pt states that she has an appt with Dr. Barraza Tuesday but she knows that she cannot wait that long     Estefania Olmos RN  01/21/17 [id number]

## 2017-01-23 ENCOUNTER — HOSPITAL ENCOUNTER (EMERGENCY)
Facility: HOSPITAL | Age: 42
Discharge: HOME | End: 2017-01-23
Attending: EMERGENCY MEDICINE | Admitting: EMERGENCY MEDICINE

## 2017-01-23 ENCOUNTER — APPOINTMENT (OUTPATIENT)
Dept: CT IMAGING | Facility: HOSPITAL | Age: 42
End: 2017-01-23

## 2017-01-23 VITALS
HEIGHT: 69 IN | BODY MASS INDEX: 22.66 KG/M2 | TEMPERATURE: 97.7 F | DIASTOLIC BLOOD PRESSURE: 72 MMHG | HEART RATE: 80 BPM | WEIGHT: 153 LBS | RESPIRATION RATE: 18 BRPM | SYSTOLIC BLOOD PRESSURE: 102 MMHG | OXYGEN SATURATION: 98 %

## 2017-01-23 DIAGNOSIS — N39.0 ACUTE UTI: Primary | ICD-10-CM

## 2017-01-23 LAB
ALBUMIN SERPL-MCNC: 4.4 G/DL (ref 3.5–5)
ALBUMIN/GLOB SERPL: 1.6 G/DL (ref 1.5–2.5)
ALP SERPL-CCNC: 53 U/L (ref 46–116)
ALT SERPL W P-5'-P-CCNC: 56 U/L (ref 10–36)
ANION GAP SERPL CALCULATED.3IONS-SCNC: 8.1 MMOL/L (ref 3.6–11.2)
AST SERPL-CCNC: 77 U/L (ref 10–30)
BACTERIA SPEC AEROBE CULT: NORMAL
BACTERIA UR QL AUTO: ABNORMAL /HPF
BASOPHILS # BLD AUTO: 0.01 10*3/MM3 (ref 0–0.3)
BASOPHILS NFR BLD AUTO: 0.2 % (ref 0–2)
BILIRUB SERPL-MCNC: 0.4 MG/DL (ref 0.2–1.8)
BILIRUB UR QL STRIP: ABNORMAL
BUN BLD-MCNC: 11 MG/DL (ref 7–21)
BUN/CREAT SERPL: 17.5 (ref 7–25)
CALCIUM SPEC-SCNC: 8.9 MG/DL (ref 7.7–10)
CHLORIDE SERPL-SCNC: 105 MMOL/L (ref 99–112)
CLARITY UR: ABNORMAL
CO2 SERPL-SCNC: 29.9 MMOL/L (ref 24.3–31.9)
COD CRY URNS QL: ABNORMAL /HPF
COLOR UR: ABNORMAL
CREAT BLD-MCNC: 0.63 MG/DL (ref 0.43–1.29)
DEPRECATED RDW RBC AUTO: 45.1 FL (ref 37–54)
EOSINOPHIL # BLD AUTO: 0.12 10*3/MM3 (ref 0–0.7)
EOSINOPHIL NFR BLD AUTO: 1.9 % (ref 0–5)
ERYTHROCYTE [DISTWIDTH] IN BLOOD BY AUTOMATED COUNT: 13.7 % (ref 11.5–14.5)
GFR SERPL CREATININE-BSD FRML MDRD: 104 ML/MIN/1.73
GLOBULIN UR ELPH-MCNC: 2.8 GM/DL
GLUCOSE BLD-MCNC: 97 MG/DL (ref 70–110)
GLUCOSE UR STRIP-MCNC: ABNORMAL MG/DL
HCT VFR BLD AUTO: 41.7 % (ref 37–47)
HGB BLD-MCNC: 13.7 G/DL (ref 12–16)
HGB UR QL STRIP.AUTO: NEGATIVE
HYALINE CASTS UR QL AUTO: ABNORMAL /LPF
IMM GRANULOCYTES # BLD: 0.01 10*3/MM3 (ref 0–0.03)
IMM GRANULOCYTES NFR BLD: 0.2 % (ref 0–0.5)
KETONES UR QL STRIP: NEGATIVE
LEUKOCYTE ESTERASE UR QL STRIP.AUTO: NEGATIVE
LYMPHOCYTES # BLD AUTO: 3.12 10*3/MM3 (ref 1–3)
LYMPHOCYTES NFR BLD AUTO: 49 % (ref 21–51)
MCH RBC QN AUTO: 31.1 PG (ref 27–33)
MCHC RBC AUTO-ENTMCNC: 32.9 G/DL (ref 33–37)
MCV RBC AUTO: 94.8 FL (ref 80–94)
MONOCYTES # BLD AUTO: 0.61 10*3/MM3 (ref 0.1–0.9)
MONOCYTES NFR BLD AUTO: 9.6 % (ref 0–10)
NEUTROPHILS # BLD AUTO: 2.5 10*3/MM3 (ref 1.4–6.5)
NEUTROPHILS NFR BLD AUTO: 39.1 % (ref 30–70)
NITRITE UR QL STRIP: NEGATIVE
OSMOLALITY SERPL CALC.SUM OF ELEC: 284.3 MOSM/KG (ref 273–305)
PH UR STRIP.AUTO: <=5 [PH] (ref 5–8)
PLATELET # BLD AUTO: 124 10*3/MM3 (ref 130–400)
PMV BLD AUTO: 10.8 FL (ref 6–10)
POTASSIUM BLD-SCNC: 5.2 MMOL/L (ref 3.5–5.3)
PROT SERPL-MCNC: 7.2 G/DL (ref 6–8)
PROT UR QL STRIP: ABNORMAL
RBC # BLD AUTO: 4.4 10*6/MM3 (ref 4.2–5.4)
RBC # UR: ABNORMAL /HPF
REF LAB TEST METHOD: ABNORMAL
SODIUM BLD-SCNC: 143 MMOL/L (ref 135–153)
SP GR UR STRIP: >=1.03 (ref 1–1.03)
SQUAMOUS #/AREA URNS HPF: ABNORMAL /HPF
UROBILINOGEN UR QL STRIP: ABNORMAL
WBC NRBC COR # BLD: 6.37 10*3/MM3 (ref 4.5–12.5)
WBC UR QL AUTO: ABNORMAL /HPF

## 2017-01-23 PROCEDURE — 87086 URINE CULTURE/COLONY COUNT: CPT | Performed by: EMERGENCY MEDICINE

## 2017-01-23 PROCEDURE — 81001 URINALYSIS AUTO W/SCOPE: CPT | Performed by: EMERGENCY MEDICINE

## 2017-01-23 PROCEDURE — 99283 EMERGENCY DEPT VISIT LOW MDM: CPT

## 2017-01-23 PROCEDURE — 25010000002 HYDROMORPHONE PER 4 MG: Performed by: EMERGENCY MEDICINE

## 2017-01-23 PROCEDURE — 96374 THER/PROPH/DIAG INJ IV PUSH: CPT

## 2017-01-23 PROCEDURE — 25010000002 ONDANSETRON PER 1 MG: Performed by: EMERGENCY MEDICINE

## 2017-01-23 PROCEDURE — 96375 TX/PRO/DX INJ NEW DRUG ADDON: CPT

## 2017-01-23 PROCEDURE — 96361 HYDRATE IV INFUSION ADD-ON: CPT

## 2017-01-23 PROCEDURE — 85025 COMPLETE CBC W/AUTO DIFF WBC: CPT | Performed by: EMERGENCY MEDICINE

## 2017-01-23 PROCEDURE — 80053 COMPREHEN METABOLIC PANEL: CPT | Performed by: EMERGENCY MEDICINE

## 2017-01-23 RX ORDER — ONDANSETRON 2 MG/ML
4 INJECTION INTRAMUSCULAR; INTRAVENOUS ONCE
Status: COMPLETED | OUTPATIENT
Start: 2017-01-23 | End: 2017-01-23

## 2017-01-23 RX ORDER — OXYCODONE HYDROCHLORIDE AND ACETAMINOPHEN 5; 325 MG/1; MG/1
1 TABLET ORAL ONCE
Status: COMPLETED | OUTPATIENT
Start: 2017-01-23 | End: 2017-01-23

## 2017-01-23 RX ORDER — SODIUM CHLORIDE 0.9 % (FLUSH) 0.9 %
10 SYRINGE (ML) INJECTION AS NEEDED
Status: DISCONTINUED | OUTPATIENT
Start: 2017-01-23 | End: 2017-01-23 | Stop reason: HOSPADM

## 2017-01-23 RX ADMIN — OXYCODONE HYDROCHLORIDE AND ACETAMINOPHEN 1 TABLET: 5; 325 TABLET ORAL at 03:45

## 2017-01-23 RX ADMIN — SODIUM CHLORIDE 1000 ML: 9 INJECTION, SOLUTION INTRAVENOUS at 02:18

## 2017-01-23 RX ADMIN — HYDROMORPHONE HYDROCHLORIDE 1 MG: 1 INJECTION, SOLUTION INTRAMUSCULAR; INTRAVENOUS; SUBCUTANEOUS at 02:18

## 2017-01-23 RX ADMIN — ONDANSETRON 4 MG: 2 INJECTION, SOLUTION INTRAMUSCULAR; INTRAVENOUS at 02:18

## 2017-01-23 NOTE — DISCHARGE INSTRUCTIONS
Continue Antibiotics prescribed in ED earlier this week for UTI.  Follow up with Dr. Barraza Urologist for further evaluation as scheduled.

## 2017-01-23 NOTE — ED NOTES
Patient states she is urinating blood. Patient states she is having pain in her right flank area that radiates underneath her ribs. Patient rates pain an 8/10. Patient states she was here yesterday and was told she had a UTI but patient states she has a history of kidney stones and she feels it is more like a kidney stone.      Fatimah Ann RN  01/23/17 4141

## 2017-01-23 NOTE — ED PROVIDER NOTES
Subjective   Patient is a 41 y.o. female presenting with flank pain.   Flank Pain   Pain location:  R flank  Pain quality: sharp    Pain radiates to:  Suprapubic region  Pain severity:  Severe  Onset quality:  Gradual  Timing:  Constant  Progression:  Worsening  Chronicity:  Chronic  Relieved by:  Nothing  Worsened by:  Urination  Ineffective treatments:  Urination and position changes  Associated symptoms: dysuria and hematuria    Associated symptoms: no chest pain, no chills, no cough, no diarrhea, no fatigue, no fever, no nausea, no shortness of breath, no sore throat, no vaginal bleeding, no vaginal discharge and no vomiting        Review of Systems   Constitutional: Negative for activity change, appetite change, chills, diaphoresis, fatigue and fever.   HENT: Negative for congestion, ear pain and sore throat.    Eyes: Negative for redness.   Respiratory: Negative for cough, chest tightness, shortness of breath and wheezing.    Cardiovascular: Negative for chest pain, palpitations and leg swelling.   Gastrointestinal: Negative for abdominal pain, diarrhea, nausea and vomiting.   Genitourinary: Positive for dysuria, flank pain and hematuria. Negative for urgency, vaginal bleeding and vaginal discharge.   Musculoskeletal: Negative for arthralgias, back pain, myalgias and neck pain.   Skin: Negative for pallor, rash and wound.   Neurological: Negative for dizziness, speech difficulty, weakness and headaches.   Psychiatric/Behavioral: Negative for agitation, behavioral problems, confusion and decreased concentration.       Past Medical History   Diagnosis Date   • Abdominal pain    • Abdominal swelling    • Bipolar 1 disorder    • Brain tumor      R Frontal Lobe per pt   • Constipation    • Diarrhea    • Fibromyalgia    • IBS (irritable bowel syndrome)    • Migraine    • Nausea & vomiting    • PONV (postoperative nausea and vomiting)    • PTSD (post-traumatic stress disorder)    • Rectal bleeding        Allergies    Allergen Reactions   • Ativan [Lorazepam] Hallucinations     confusion   • Sulfa Antibiotics Shortness Of Breath and Swelling   • Compazine [Prochlorperazine Edisylate] Hives   • Demerol [Meperidine] Hives   • Droperidol    • Prochlorperazine    • Toradol [Ketorolac Tromethamine] Hives and Itching       Past Surgical History   Procedure Laterality Date   • Hysterectomy     • Appendectomy     • Shoulder surgery       3 times   • Knee surgery     • Endoscopy N/A 6/30/2016     Procedure: ESOPHAGOGASTRODUODENOSCOPY WITH BIOPSY  CPTCODE:23540;  Surgeon: Jose Antonio Belle III, MD;  Location: [facility] OR;  Service:    • Colonoscopy N/A 6/30/2016     Procedure: COLONOSCOPY  CPTCODE:67731;  Surgeon: Jose Antonio Belle III, MD;  Location: [facility] OR;  Service:    • Colonoscopy N/A 7/7/2016     Procedure: COLONOSCOPY (38771) CPT;  Surgeon: Jose Antonio Belle III, MD;  Location: [facility] OR;  Service:    • Hemorrhoidectomy N/A 7/28/2016     Procedure: HEMORRHOID STAPLING;  Surgeon: Kael Lopez MD;  Location: [facility] OR;  Service:    • Anal scope N/A 7/28/2016     Procedure: ANAL SCOPE;  Surgeon: Kael Lopez MD;  Location: [facility] OR;  Service:        Family History   Problem Relation Age of Onset   • Crohn's disease Other    • Hypertension Other    • Diabetes Other    • Irritable bowel syndrome Other        Social History     Social History   • Marital status:      Spouse name: N/A   • Number of children: N/A   • Years of education: N/A     Social History Main Topics   • Smoking status: Former Smoker     Types: Cigarettes     Quit date: 11/1/2015   • Smokeless tobacco: Never Used   • Alcohol use No   • Drug use: Yes     Special: Marijuana   • Sexual activity: Defer     Other Topics Concern   • None     Social History Narrative           Objective   Physical Exam   Constitutional: She is oriented to person, place, and time. She appears well-developed and well-nourished.  Non-toxic appearance. No distress.   HENT:    Head: Normocephalic and atraumatic.   Right Ear: External ear normal.   Left Ear: External ear normal.   Nose: Nose normal.   Mouth/Throat: Oropharynx is clear and moist and mucous membranes are normal. No oropharyngeal exudate. No tonsillar exudate.   Eyes: Conjunctivae, EOM and lids are normal. Pupils are equal, round, and reactive to light.   Neck: Normal range of motion and full passive range of motion without pain. Neck supple. No thyromegaly present.   Cardiovascular: Normal rate, regular rhythm, S1 normal, S2 normal, normal heart sounds, intact distal pulses and normal pulses.    Pulmonary/Chest: Effort normal and breath sounds normal. No tachypnea. No respiratory distress. She has no decreased breath sounds. She has no wheezes. She has no rales. She exhibits no tenderness.   Abdominal: Soft. Normal appearance and bowel sounds are normal. She exhibits no distension. There is tenderness. There is no rebound and no guarding.   Musculoskeletal: Normal range of motion. She exhibits no edema, tenderness or deformity.   Lymphadenopathy:     She has no cervical adenopathy.   Neurological: She is alert and oriented to person, place, and time. She has normal strength. No cranial nerve deficit or sensory deficit. GCS eye subscore is 4. GCS verbal subscore is 5. GCS motor subscore is 6.   Skin: Skin is warm, dry and intact. No rash noted. She is not diaphoretic. No erythema. No pallor.   Psychiatric: She has a normal mood and affect. Her speech is normal and behavior is normal. Judgment and thought content normal. Cognition and memory are normal.   Nursing note and vitals reviewed.      Procedures         ED Course  ED Course   Comment By Time   Patient has follow up with Dr. Barraza Urologist later today.  In the ED fore some pain relief.  Denied/Refused CT scan at this time to further evaluate.  Would rather follow up with Dr. Barraza as scheduled.   Will continue Antibiotics prescribed earlier this week in ED for  UTI. Jaiden Bonner MD 01/23 0703                  MDM  Number of Diagnoses or Management Options  Acute UTI: new and requires workup     Amount and/or Complexity of Data Reviewed  Clinical lab tests: ordered and reviewed  Tests in the medicine section of CPT®: ordered and reviewed  Review and summarize past medical records: yes    Risk of Complications, Morbidity, and/or Mortality  Presenting problems: moderate  Diagnostic procedures: moderate  Management options: moderate    Patient Progress  Patient progress: stable      Final diagnoses:   Acute UTI            Jaiden Bonner MD  01/23/17 0704

## 2017-01-24 NOTE — PAYOR COMM NOTE
"[facility]  NPI [id number]    Utilization Review Nurse  Contact: Gabrielle Hernandez RN, BSN  Phone: [phone]  Fax: [phone]    Brandyn /Attn:alla Landaverde clinical information/2nd req for inpatient  REF:[id number]  Jes Darden (41 y.o. Female)     Date of Birth Social Security Number Address Home Phone MRN    1975  [address] [address] [phone] [id number]    Jehovah's witness Marital Status          None        Admission Date Admission Type Admitting Provider Attending Provider Department, Room/Bed    1/14/17 Emergency Acob, Casper HUNT MD  [facility] 2 SOUTH, 210/1S    Discharge Date Discharge Disposition Discharge Destination        1/17/2017 Home or Self Care             Attending Provider: (none)    Allergies:  Ativan [Lorazepam], Sulfa Antibiotics, Compazine [Prochlorperazine Edisylate], Demerol [Meperidine], Droperidol, Prochlorperazine, Toradol [Ketorolac Tromethamine]    Isolation:  None   Infection:  None   Code Status:  Prior    Ht:  69\" (175.3 cm)   Wt:  153 lb (69.4 kg)    Admission Cmt:  None   Principal Problem:  None                Active Insurance as of 1/14/2017     Primary Coverage     Payor Plan Insurance Group Employer/Plan Group    [facility]      Payor Plan Address Payor Plan Phone Number Effective From Effective To    [address] [address]  1/1/2015     [place] [address]       Subscriber Name Subscriber Birth Date Member ID       JES DARDEN 1975 [id number]                 Emergency Contacts      (Rel.) Home Phone Work Phone Mobile Phone    Odin Cardoza (Significant Other) -- -- [phone]        Moreno Ware MD Physician Signed  ED Provider Notes Date of Service: 1/14/2017 11:42 AM      Expand All Collapse All    Hide copied text  Hover for attribution information  Subjective     History of Present Illness  41-year-old white female complains of headache. She she describes a diffuse " pressure and throbbing headache. She has a history of migraines. She states this headache, however, is different in character in that it tends to be worse when she sits or stands. She has associated nausea and vomiting. She also has photophobia, but denies any aura or scotomata. She was seen here yesterday for similar symptoms which improved after pain medications, but then recurred early this morning. She has taken her headache medicine at home which includes multiple drugs (see her med list) without relief.  Review of Systems   All other systems reviewed and are negative.              Past Medical History   Diagnosis Date   • Abdominal pain     • Abdominal swelling     • Bipolar 1 disorder     • Brain tumor         R Frontal Lobe per pt   • Constipation     • Diarrhea     • Fibromyalgia     • IBS (irritable bowel syndrome)     • Migraine     • Nausea & vomiting     • PONV (postoperative nausea and vomiting)     • PTSD (post-traumatic stress disorder)     • Rectal bleeding                 Allergies   Allergen Reactions   • Ativan [Lorazepam] Hallucinations       confusion   • Sulfa Antibiotics Shortness Of Breath and Swelling   • Compazine [Prochlorperazine Edisylate] Hives   • Demerol [Meperidine] Hives   • Droperidol     • Prochlorperazine     • Toradol [Ketorolac Tromethamine] Hives and Itching                Past Surgical History   Procedure Laterality Date   • Hysterectomy       • Appendectomy       • Shoulder surgery           3 times   • Knee surgery       • Endoscopy N/A 6/30/2016       Procedure: ESOPHAGOGASTRODUODENOSCOPY WITH BIOPSY CPTCODE:16338; Surgeon: Jose Antonio Belle III, MD; Location: [facility] OR; Service:    • Colonoscopy N/A 6/30/2016       Procedure: COLONOSCOPY CPTCODE:86427; Surgeon: Jose Antonio Belle III, MD; Location: [facility] OR; Service:    • Colonoscopy N/A 7/7/2016       Procedure: COLONOSCOPY (72044) CPT; Surgeon: Jose Antonio Belle III, MD; Location: [facility] OR; Service:    •  Hemorrhoidectomy N/A 7/28/2016       Procedure: HEMORRHOID STAPLING; Surgeon: Kael Lopez MD; Location: [facility] OR; Service:    • Anal scope N/A 7/28/2016       Procedure: ANAL SCOPE; Surgeon: Kael Lopez MD; Location: [facility] OR; Service:                Family History   Problem Relation Age of Onset   • Crohn's disease Other     • Hypertension Other     • Diabetes Other     • Irritable bowel syndrome Other           Social History            Social History   • Marital status:        Spouse name: N/A   • Number of children: N/A   • Years of education: N/A            Social History Main Topics   • Smoking status: Former Smoker       Types: Cigarettes       Quit date: 11/1/2015   • Smokeless tobacco: Never Used   • Alcohol use No   • Drug use: Yes       Special: Marijuana   • Sexual activity: Defer           Other Topics Concern   • None      Social History Narrative               Objective     Physical Exam   Constitutional: She is oriented to person, place, and time. She appears well-developed and well-nourished.   HENT:   Head: Normocephalic and atraumatic.   Mouth/Throat: Oropharynx is clear and moist.   Eyes: Conjunctivae and EOM are normal. Pupils are equal, round, and reactive to light.   Neck: Normal range of motion. Neck supple. No rigidity.   Cardiovascular: Normal rate, regular rhythm and normal heart sounds. Exam reveals no gallop and no friction rub.   No murmur heard.  Pulmonary/Chest: Effort normal and breath sounds normal. No respiratory distress. She has no wheezes. She has no rales.   Abdominal: Soft. Bowel sounds are normal. She exhibits no distension. There is no tenderness. There is no rebound and no guarding.   Musculoskeletal: Normal range of motion. She exhibits no edema.   Neurological: She is alert and oriented to person, place, and time. She has normal strength and normal reflexes. No cranial nerve deficit or sensory deficit.   Skin: Skin is warm and dry.   Psychiatric: She has  a normal mood and affect.   Nursing note and vitals reviewed.        Procedures        Results for orders placed or performed during the hospital encounter of 01/14/17   Comprehensive Metabolic Panel   Result Value Ref Range     Glucose 76 70 - 110 mg/dL     BUN 14 7 - 21 mg/dL     Creatinine 0.64 0.43 - 1.29 mg/dL     Sodium 146 135 - 153 mmol/L     Potassium 3.9 3.5 - 5.3 mmol/L     Chloride 105 99 - 112 mmol/L     CO2 31.5 24.3 - 31.9 mmol/L     Calcium 9.1 7.7 - 10.0 mg/dL     Total Protein 7.1 6.0 - 8.0 g/dL     Albumin 4.30 3.50 - 5.00 g/dL     ALT (SGPT) 57 (H) 10 - 36 U/L     AST (SGOT) 55 (H) 10 - 30 U/L     Alkaline Phosphatase 57 46 - 116 U/L     Total Bilirubin 0.5 0.2 - 1.8 mg/dL     eGFR Non African Amer 102 >60 mL/min/1.73     Globulin 2.8 gm/dL     A/G Ratio 1.5 1.5 - 2.5 g/dL     BUN/Creatinine Ratio 21.9 7.0 - 25.0     Anion Gap 9.5 3.6 - 11.2 mmol/L   CBC Auto Differential   Result Value Ref Range     WBC 5.58 4.50 - 12.50 10*3/mm3     RBC 4.18 (L) 4.20 - 5.40 10*6/mm3     Hemoglobin 13.3 12.0 - 16.0 g/dL     Hematocrit 39.8 37.0 - 47.0 %     MCV 95.2 (H) 80.0 - 94.0 fL     MCH 31.8 27.0 - 33.0 pg     MCHC 33.4 33.0 - 37.0 g/dL     RDW 14.2 11.5 - 14.5 %     RDW-SD 48.8 37.0 - 54.0 fl     MPV 10.6 (H) 6.0 - 10.0 fL     Platelets 98 (L) 130 - 400 10*3/mm3     Neutrophil % 39.1 30.0 - 70.0 %     Lymphocyte % 50.7 21.0 - 51.0 %     Monocyte % 8.2 0.0 - 10.0 %     Eosinophil % 1.4 0.0 - 5.0 %     Basophil % 0.2 0.0 - 2.0 %     Immature Grans % 0.4 0.0 - 0.5 %     Neutrophils, Absolute 2.18 1.40 - 6.50 10*3/mm3     Lymphocytes, Absolute 2.83 1.00 - 3.00 10*3/mm3     Monocytes, Absolute 0.46 0.10 - 0.90 10*3/mm3     Eosinophils, Absolute 0.08 0.00 - 0.70 10*3/mm3     Basophils, Absolute 0.01 0.00 - 0.30 10*3/mm3     Immature Grans, Absolute 0.02 0.00 - 0.03 10*3/mm3   Osmolality, Calculated   Result Value Ref Range     Osmolality Calc 289.8 273.0 - 305.0 mOsm/kg      Ct Head Without Contrast     Result  Date: 1/14/2017  Narrative: CT HEAD WO CONTRAST- CLINICAL INDICATION: ha COMPARISON: 12/20/2016 TECHNIQUE: Axial images of the brain were obtained with out intravenous contrast. Reformatted images were created in the sagittal and coronal planes. Radiation dose reduction techniques were utilized per ALARA protocol. Automated exposure control was initiated through either or CareDose or DoseRight software packages by  protocol. FINDINGS: Again noted is a partially calcified mass anteriorly in the right frontal region. This measures 1.3 cm. It is extra-axial and most likely as a meningioma. There is no parenchymal mass hemorrhage or midline shift The ventricles, cisterns, and sulci are unremarkable. There is no hydrocephalus. There is no evidence of acute ischemia. I do not see epidural or subdural hematoma. The gray-white differentiation is appropriate. The bone window setting images show no destructive calvarial lesion or acute calvarial fracture. The posterior fossa is unremarkable.      Impression: 1.3 cm partially calcified mass which is extra-axial in the right frontal region. This report was finalized on 1/14/2017 11:12 AM by Dr. Luis Fernando Macdonald MD.      Ct Head Without Contrast     Result Date: 12/21/2016  Narrative: CT HEAD WITHOUT CONTRAST- CLINICAL INDICATION: Headache. COMPARISON: None immediately available. TECHNIQUE: Axial images of the brain were obtained with out intravenous contrast. Reformatted images were created in the sagittal and coronal planes. Radiation dose reduction techniques were utilized per ALARA protocol. Automated exposure control was initiated through either or CareDose or DoseRight software packages by  protocol. FINDINGS: There is an extra-axial partially calcified mass anteriorly in the right frontal region. This measures 1.1 cm and most likely represents a partially calcified meningioma. The ventricles, cisterns, and sulci are unremarkable. There is no  hydrocephalus. There is no evidence of acute ischemia. I do not see epidural or subdural hematoma. The gray-white differentiation is appropriate. The bone window setting images show no destructive calvarial lesion or acute calvarial fracture. The posterior fossa is unremarkable.      Impression: 1.1 cm partially calcified extra-axial lesion in the right frontal region most likely a meningioma. I recommend MRI. This report was finalized on 12/21/2016 8:48 AM by Dr. Luis Feranndo Macdonald MD.               ED Course        ED Course    Comment By Time   Discussed with Dr. Horton. Patient admitted, see orders. Moreno Ware MD 01/14 1157                      MDM  Number of Diagnoses or Management Options  Chronic migraine without aura, intractable, with status migrainosus: established and worsening  Amount and/or Complexity of Data Reviewed  Clinical lab tests: reviewed and ordered  Tests in the radiology section of CPT®: reviewed and ordered  Risk of Complications, Morbidity, and/or Mortality  Presenting problems: high  Diagnostic procedures: high  Management options: high         Final diagnoses:   Chronic migraine without aura, intractable, with status migrainosus            Moreno Ware MD  01/14/17 1157                         Casper Horton MD Physician Signed Neurology Progress Notes Date of Service: 1/17/2017  1:14 PM      Expand All Collapse All    Hide copied text  Hover for attribution information  Assessment/Plan      Assessment:  (Status migraine).      Plan:   (- Patient has been referred to [facility] for headache management.  - She is to continue DEPAKOTE and TOPAMAX for headache prophylaxis.  - She is aware that I will not be able to help her with narcotic shots from now on because she was positive for marijuana on for one o her emergency room visits Urine drug screen.  ).      Subjective      Subjective:  Symptoms: (Still with headache but she reported it is tolerable with a pain shot and DHE.).     "  Temp: [98.1 °F (36.7 °C)] 98.1 °F (36.7 °C)  Heart Rate: [53-78] 53  Resp: [20] 20  BP: (120-122)/(66-67) 122/67  I/O last 3 completed shifts:  In: 1440 [P.O.:1440]  Out: 8 [Urine:8]  I/O this shift:  In: 360 [P.O.:360]  Out: -   Objective      Objective:  General Appearance: Comfortable.   Vital signs: (most recent): Blood pressure 122/67, pulse 53, temperature 98.1 °F (36.7 °C), temperature source Oral, resp. rate 20, height 69\" (175.3 cm), weight 153 lb (69.4 kg), SpO2 93 %. Vital signs are normal.   HEENT: Normal HEENT exam.   Lungs: Normal effort.   Chest: No chest wall tenderness.   Extremities: Normal range of motion.   Skin: Warm.   Abdomen: Abdomen is soft. There is no abdominal tenderness.   Pulses: Distal pulses are intact.      Neurologic Exam      Mental Status   Oriented to person, place, and time.   Level of consciousness: alert  Knowledge: good.   Able to name object.      Cranial Nerves      CN II   Visual fields full to confrontation.      CN III, IV, VI   Pupils are equal, round, and reactive to light.     CN V   Facial sensation intact.      CN VII   Facial expression full, symmetric.      CN VIII   CN VIII normal.      CN IX, X   CN IX normal.      CN XI   CN XI normal.      CN XII   CN XII normal.      Motor Exam   Muscle bulk: normal  Overall muscle tone: normal     Strength   Right neck flexion: 5/5  Left neck flexion: 5/5  Right neck extension: 5/5  Left neck extension: 5/5  Right deltoid: 5/5  Left deltoid: 5/5  Right biceps: 5/5  Left biceps: 5/5  Right triceps: 5/5  Left triceps: 5/5  Right wrist flexion: 5/5  Left wrist flexion: 5/5  Right wrist extension: 5/5  Left wrist extension: 5/5  Right interossei: 5/5  Left interossei: 5/5  Right abdominals: 5/5  Left abdominals: 5/5  Right iliopsoas: 5/5  Left iliopsoas: 5/5  Right quadriceps: 5/5  Left quadriceps: 5/5  Right hamstrin/5  Left hamstrin/5  Right glutei: 5/5  Left glutei: 5/5  Right anterior tibial: 5/5  Left anterior " "tibial: 5/5  Right posterior tibial: 5/5  Left posterior tibial: 5/5  Right peroneal: 5/5  Left peroneal: 5/5  Right gastroc: 5/5  Left gastroc: 5/5     Sensory Exam   Light touch normal.   Right arm light touch: normal  Left arm light touch: normal  Vibration normal.   Right arm vibration: normal  Left arm vibration: normal     Gait, Coordination, and Reflexes      Gait  Gait: normal     Coordination   Romberg: negative  Finger to nose coordination: normal  Heel to shin coordination: normal  Tandem walking coordination: normal     Tremor   Resting tremor: absent  Intention tremor: absent  Action tremor: absent     Active Problems:  Chronic migraine without aura, intractable, with status migrainosus                         Casper Horton MD Physician Signed Neurology Progress Notes Date of Service: 1/16/2017  8:21 PM      Expand All Collapse All    Hide copied text  Hover for attribution information  Assessment/Plan      Assessment:  (Status migraine).      Plan:   (- Continue DHE. To complete 3 days.  - Hold DEPAKOTE till levels come down.  - Increase TOPAMAX 25mg q AM and 50mg qHS.).      Subjective      Subjective:  Symptoms: (Headache somewhat better with DILAUDID.  Denies any side effects from current medication.).      Temp: [97.9 °F (36.6 °C)-98.1 °F (36.7 °C)] 98.1 °F (36.7 °C)  Heart Rate: [70-78] 78  Resp: [18-20] 20  BP: (120-147)/(66-77) 120/66  I/O last 3 completed shifts:  In: 1560 [P.O.:1560]  Out: 8 [Urine:8]     Objective      Objective:  General Appearance: Comfortable.   Vital signs: (most recent): Blood pressure 120/66, pulse 78, temperature 98.1 °F (36.7 °C), temperature source Oral, resp. rate 20, height 69\" (175.3 cm), weight 153 lb (69.4 kg), SpO2 94 %. Vital signs are normal.   HEENT: Normal HEENT exam.   Lungs: Normal effort.   Chest: No chest wall tenderness.   Extremities: Normal range of motion.   Skin: Warm.   Abdomen: Abdomen is soft. There is no abdominal tenderness.   Pulses: Distal " "pulses are intact.      Neurologic Exam  Active Problems:  Chronic migraine without aura, intractable, with status migrainosus                         Casper Horton MD Physician Signed Neurology Progress Notes Date of Service: 1/15/2017  4:01 PM      Expand All Collapse All    Hide copied text  Hover for attribution information  Assessment/Plan      Assessment:  (Status Migrainosus).      Plan:   (- DHE as to KAELYN's Protocol.  - Increase DEPAKOTE 500mg TID.  - Dilaudid for rescue.).      Subjective      Subjective:  Symptoms: (Still with headache  Patient reported some Nausea and Vomiting.).      Temp: [97.6 °F (36.4 °C)-98.2 °F (36.8 °C)] 98.2 °F (36.8 °C)  Heart Rate: [55-63] 55  Resp: [16-18] 18  BP: (105-128)/(59-76) 128/63  I/O last 3 completed shifts:  In: 2000 [I.V.:1000; IV Piggyback:1000]  Out: -      Objective      Objective:  General Appearance: Comfortable.   Vital signs: (most recent): Blood pressure 128/63, pulse 55, temperature 98.2 °F (36.8 °C), temperature source Oral, resp. rate 18, height 69\" (175.3 cm), weight 153 lb (69.4 kg), SpO2 99 %. Vital signs are normal.   HEENT: Normal HEENT exam.   Lungs: Normal effort.   Chest: No chest wall tenderness.   Extremities: Normal range of motion.   Skin: Warm.   Abdomen: Abdomen is soft. There is no abdominal tenderness.   Pulses: Distal pulses are intact.      Neurologic Exam  Active Problems:  Chronic migraine without aura, intractable, with status migrainosus                         Casper Horton MD Physician Signed Neurology H&P Date of Service: 1/14/2017  6:06 PM      Expand All Collapse All    Hide copied text  Hover for attribution information           Patient Care Team:  MIGDALIA Gooden as PCP - General (Family Medicine)     Chief complaint: headache     Subjective        HPI Comments: This is a 41 year-old  female with chronic headache. She failed outpatient therapy. She went to the emergency room for severe headache No " response to medication given to the emergency room.     Known patient with chronic migraine was have given Botox 12/7/16 with some response. She has been taking Depakote and Topamax for headache prophylaxis to no avail. she feels that Botox help her after it was given in December hat started having more and more headache early this year. She has been getting injections to outpatient clinic.     Note the she has degenreative disease cervical spine which also trigger for her headache. Patient also has bipolar disorder and fibromyalgia.      Patient also complain of severe nausea along with this headache that she described as severe throbbing pain felt all over her head.     Headache    Associated symptoms include nausea.         Review of Systems   Constitutional: Negative.   HENT:   Severe headache   Respiratory: Negative.   Cardiovascular: Negative.   Gastrointestinal: Positive for nausea.   Genitourinary: Negative.   Musculoskeletal: Negative.   Neurological: Positive for headaches.   Psychiatric/Behavioral: Positive for sleep disturbance. The patient is nervous/anxious.               Past Medical History   Diagnosis Date   • Abdominal pain     • Abdominal swelling     • Bipolar 1 disorder     • Brain tumor         R Frontal Lobe per pt   • Constipation     • Diarrhea     • Fibromyalgia     • IBS (irritable bowel syndrome)     • Migraine     • Nausea & vomiting     • PONV (postoperative nausea and vomiting)     • PTSD (post-traumatic stress disorder)     • Rectal bleeding               Past Surgical History   Procedure Laterality Date   • Hysterectomy       • Appendectomy       • Shoulder surgery           3 times   • Knee surgery       • Endoscopy N/A 6/30/2016       Procedure: ESOPHAGOGASTRODUODENOSCOPY WITH BIOPSY CPTCODE:31467; Surgeon: Jose Antonio Belle III, MD; Location: [facility]; Service:    • Colonoscopy N/A 6/30/2016       Procedure: COLONOSCOPY CPTCODE:96521; Surgeon: Jose Antonio Belle III, MD;  Location:  [facility] OR; Service:    • Colonoscopy N/A 7/7/2016       Procedure: COLONOSCOPY (08393) CPT; Surgeon: Jose Antonio Belle III, MD; Location:  [facility] OR; Service:    • Hemorrhoidectomy N/A 7/28/2016       Procedure: HEMORRHOID STAPLING; Surgeon: Kael Lopez MD; Location:  [facility] OR; Service:    • Anal scope N/A 7/28/2016       Procedure: ANAL SCOPE; Surgeon: Kael Lopez MD; Location:  [facility] OR; Service:             Family History   Problem Relation Age of Onset   • Crohn's disease Other     • Hypertension Other     • Diabetes Other     • Irritable bowel syndrome Other              Social History   Substance Use Topics   • Smoking status: Former Smoker       Types: Cigarettes       Quit date: 11/1/2015   • Smokeless tobacco: Never Used   • Alcohol use No              Prescriptions Prior to Admission   Medication Sig Dispense Refill Last Dose   • azelastine (ASTEPRO) 0.15 % solution nasal spray 2 sprays into each nostril 2 (Two) Times a Day. 30 mL 0 1/13/2017 at 2000   • busPIRone (BUSPAR) 15 MG tablet Take 15 mg by mouth 3 (three) times a day         1/13/2017 at 2000   • butalbital-acetaminophen-caffeine (FIORICET) -40 MG per tablet Take 1-2 tablets by mouth Every 6 (Six) Hours As Needed for headaches or migraine. 20 tablet 0 Past Week at Unknown time   • carbidopa-levodopa (SINEMET)  MG per tablet Take 1 tablet by mouth 3 (Three) Times a Day.     1/13/2017 at 2000   • cetirizine (zyrTEC) 10 MG tablet Take 1 tablet by mouth Daily for 30 days. 30 tablet 0 1/13/2017 at 2000   • divalproex (DEPAKOTE) 500 MG DR tablet Take 500 mg by mouth 2 (Two) Times a Day.     1/13/2017 at 2000   • fluticasone (FLONASE) 50 MCG/ACT nasal spray 1 spray into each nostril 2 (Two) Times a Day for 30 days. 1 bottle 0 1/13/2017 at 2000   • montelukast (SINGULAIR) 10 MG tablet Take 1 tablet by mouth Every Night for 30 days. 30 tablet 0 1/13/2017 at 2000   • pantoprazole (PROTONIX) 40 MG EC tablet Take 40 mg by mouth  Daily As Needed.     Past Week at Unknown time   • promethazine (PHENERGAN) 25 MG tablet Take 25 mg by mouth 2 (Two) Times a Day As Needed for nausea or vomiting.     1/13/2017 at 2000   • sertraline (ZOLOFT) 100 MG tablet Take 150 mg by mouth daily.     1/13/2017 at am   • SUMAtriptan (IMITREX) 6 MG/0.5ML solution injection Inject 6 mg under the skin 1 (One) Time As Needed for migraine.     1/13/2017 at Unknown time   • topiramate (TOPAMAX) 25 MG tablet Take 25 mg by mouth 2 (Two) Times a Day.     1/13/2017 at 2000   • famotidine (PEPCID) 40 MG tablet Take 40 mg by mouth 2 (Two) Times a Day As Needed for heartburn.     Unknown at Unknown time      Allergies: Ativan [lorazepam]; Sulfa antibiotics; Compazine [prochlorperazine edisylate]; Demerol [meperidine]; Droperidol; Prochlorperazine; and Toradol [ketorolac tromethamine]     Objective         Vital Signs  Temp: [98.1 °F (36.7 °C)] 98.1 °F (36.7 °C)  Heart Rate: [53-78] 53  Resp: [20] 20  BP: (120-122)/(66-67) 122/67     Physical Exam   Constitutional: She is oriented to person, place, and time. She appears well-developed.   HENT:   Head: Normocephalic.   Eyes: Pupils are equal, round, and reactive to light.   Neck: Normal range of motion.   Cardiovascular: Normal rate.   Pulmonary/Chest: Effort normal.   Abdominal: Soft.   Musculoskeletal: Normal range of motion.   Neurological: She is alert and oriented to person, place, and time. She has normal reflexes.   Skin: Skin is warm.   Nursing note and vitals reviewed.        Results Review:  I reviewed the patient's new clinical results.        Assessment/Plan        Active Problems:  Chronic migraine without aura, intractable, with status migrainosus        Assessment:  (Status migraine.).      Plan:   (- With admitted for DHE therapy. Toñito's Protocol.  - CheckVvalproic acid level).         I discussed the patients findings and my recommendations with patient     Casper Horton MD  01/17/17  1:06 PM                 Routing History       Date/Time From To Method     1/17/2017  1:13 PM MD Mabel Harper, APRN Fax

## 2017-01-25 LAB — BACTERIA SPEC AEROBE CULT: NORMAL

## 2017-01-27 NOTE — DISCHARGE SUMMARY
[facility]  DISCHARGED  SUMMARY    Susanna DARDEN 1975    Date of Admission: 01/14/17   Date of Discharge:   01/17/17     Problem list:   Intractable headache     Presenting problem/history of present illness:   This is a 41-year-old  female with chronic migraine.  She has started on BOTOX therapy.  She has failed multiple outpatient therapy.  Patient admitted again from the emergency room after she presented with severe headache with no response to her current medication.  She had a good response with DHE therapy but it was out of stock for  months.  She is awaiting referral to a [facility] course:    Patient was admitted from the emergency room and was started with fluids.  He was given DHE therapy as to To[facility]'s protocol.  Getting Reglan as premeds  10 mg IV prior to  DHE 0.5 mg every 8 hours.  Her valproic acid level was also checked and when she gets an extra 500 mg IV of Depakote her level went up.  Intermittent  she would ask for narcotic shots.  She was given Dilaudid for rescue.  I have told Depakote level Friday after her level was elevated.  The patient was also on Topamax and it was increased to 25 mg in the morning and 50 mg bedtime.  The rest of the hospital stay was unremarkable she was advised to follow up with primary care and to keep the appointment with [facility] and      Condition on discharge: Stable  Discharge disposition: Patient to follow up as an outpatient.  Discharge medication: Continue her medication  Discharge diet   Activity at discharge: No restriction  Follow up appointment : Patient to call my office for follow up appointment

## 2017-01-29 ENCOUNTER — APPOINTMENT (OUTPATIENT)
Dept: CT IMAGING | Facility: HOSPITAL | Age: 42
End: 2017-01-29

## 2017-01-29 ENCOUNTER — HOSPITAL ENCOUNTER (EMERGENCY)
Facility: HOSPITAL | Age: 42
Discharge: HOME | End: 2017-01-29
Attending: EMERGENCY MEDICINE | Admitting: EMERGENCY MEDICINE

## 2017-01-29 VITALS
DIASTOLIC BLOOD PRESSURE: 80 MMHG | BODY MASS INDEX: 22.66 KG/M2 | HEIGHT: 69 IN | WEIGHT: 153 LBS | RESPIRATION RATE: 18 BRPM | HEART RATE: 82 BPM | OXYGEN SATURATION: 97 % | SYSTOLIC BLOOD PRESSURE: 128 MMHG | TEMPERATURE: 97.2 F

## 2017-01-29 DIAGNOSIS — R31.9 HEMATURIA: Primary | ICD-10-CM

## 2017-01-29 DIAGNOSIS — R10.9 RIGHT FLANK PAIN: ICD-10-CM

## 2017-01-29 LAB
ALBUMIN SERPL-MCNC: 4.3 G/DL (ref 3.5–5)
ALBUMIN/GLOB SERPL: 1.5 G/DL (ref 1.5–2.5)
ALP SERPL-CCNC: 65 U/L (ref 46–116)
ALT SERPL W P-5'-P-CCNC: 35 U/L (ref 10–36)
AMPHET+METHAMPHET UR QL: NEGATIVE
ANION GAP SERPL CALCULATED.3IONS-SCNC: 7.8 MMOL/L (ref 3.6–11.2)
AST SERPL-CCNC: 51 U/L (ref 10–30)
BACTERIA UR QL AUTO: ABNORMAL /HPF
BARBITURATES UR QL SCN: POSITIVE
BASOPHILS # BLD AUTO: 0.01 10*3/MM3 (ref 0–0.3)
BASOPHILS NFR BLD AUTO: 0.2 % (ref 0–2)
BENZODIAZ UR QL SCN: NEGATIVE
BILIRUB SERPL-MCNC: 0.4 MG/DL (ref 0.2–1.8)
BILIRUB UR QL STRIP: NEGATIVE
BUN BLD-MCNC: 12 MG/DL (ref 7–21)
BUN/CREAT SERPL: 16.7 (ref 7–25)
CALCIUM SPEC-SCNC: 9.2 MG/DL (ref 7.7–10)
CANNABINOIDS SERPL QL: POSITIVE
CHLORIDE SERPL-SCNC: 108 MMOL/L (ref 99–112)
CLARITY UR: ABNORMAL
CO2 SERPL-SCNC: 29.2 MMOL/L (ref 24.3–31.9)
COCAINE UR QL: NEGATIVE
COLOR UR: ABNORMAL
CREAT BLD-MCNC: 0.72 MG/DL (ref 0.43–1.29)
DEPRECATED RDW RBC AUTO: 45.2 FL (ref 37–54)
EOSINOPHIL # BLD AUTO: 0.08 10*3/MM3 (ref 0–0.7)
EOSINOPHIL NFR BLD AUTO: 1.5 % (ref 0–5)
ERYTHROCYTE [DISTWIDTH] IN BLOOD BY AUTOMATED COUNT: 13.7 % (ref 11.5–14.5)
GFR SERPL CREATININE-BSD FRML MDRD: 89 ML/MIN/1.73
GLOBULIN UR ELPH-MCNC: 2.8 GM/DL
GLUCOSE BLD-MCNC: 87 MG/DL (ref 70–110)
GLUCOSE UR STRIP-MCNC: NEGATIVE MG/DL
HCT VFR BLD AUTO: 39.7 % (ref 37–47)
HGB BLD-MCNC: 13 G/DL (ref 12–16)
HGB UR QL STRIP.AUTO: ABNORMAL
HYALINE CASTS UR QL AUTO: ABNORMAL /LPF
IMM GRANULOCYTES # BLD: 0.01 10*3/MM3 (ref 0–0.03)
IMM GRANULOCYTES NFR BLD: 0.2 % (ref 0–0.5)
KETONES UR QL STRIP: NEGATIVE
LEUKOCYTE ESTERASE UR QL STRIP.AUTO: ABNORMAL
LYMPHOCYTES # BLD AUTO: 2.53 10*3/MM3 (ref 1–3)
LYMPHOCYTES NFR BLD AUTO: 47.8 % (ref 21–51)
MCH RBC QN AUTO: 31.5 PG (ref 27–33)
MCHC RBC AUTO-ENTMCNC: 32.7 G/DL (ref 33–37)
MCV RBC AUTO: 96.1 FL (ref 80–94)
METHADONE UR QL SCN: NEGATIVE
MONOCYTES # BLD AUTO: 0.4 10*3/MM3 (ref 0.1–0.9)
MONOCYTES NFR BLD AUTO: 7.6 % (ref 0–10)
NEUTROPHILS # BLD AUTO: 2.26 10*3/MM3 (ref 1.4–6.5)
NEUTROPHILS NFR BLD AUTO: 42.7 % (ref 30–70)
NITRITE UR QL STRIP: NEGATIVE
OPIATES UR QL: POSITIVE
OSMOLALITY SERPL CALC.SUM OF ELEC: 287.8 MOSM/KG (ref 273–305)
OXYCODONE UR QL SCN: NEGATIVE
PCP UR QL SCN: NEGATIVE
PH UR STRIP.AUTO: 6.5 [PH] (ref 5–8)
PLATELET # BLD AUTO: 117 10*3/MM3 (ref 130–400)
PMV BLD AUTO: 10.8 FL (ref 6–10)
POTASSIUM BLD-SCNC: 3.5 MMOL/L (ref 3.5–5.3)
PROPOXYPH UR QL: NEGATIVE
PROT SERPL-MCNC: 7.1 G/DL (ref 6–8)
PROT UR QL STRIP: ABNORMAL
RBC # BLD AUTO: 4.13 10*6/MM3 (ref 4.2–5.4)
RBC # UR: ABNORMAL /HPF
REF LAB TEST METHOD: ABNORMAL
SODIUM BLD-SCNC: 145 MMOL/L (ref 135–153)
SP GR UR STRIP: 1.01 (ref 1–1.03)
SQUAMOUS #/AREA URNS HPF: ABNORMAL /HPF
UROBILINOGEN UR QL STRIP: ABNORMAL
WBC NRBC COR # BLD: 5.29 10*3/MM3 (ref 4.5–12.5)
WBC UR QL AUTO: ABNORMAL /HPF

## 2017-01-29 PROCEDURE — 96361 HYDRATE IV INFUSION ADD-ON: CPT

## 2017-01-29 PROCEDURE — 25010000002 BUTORPHANOL PER 1 MG: Performed by: EMERGENCY MEDICINE

## 2017-01-29 PROCEDURE — 85025 COMPLETE CBC W/AUTO DIFF WBC: CPT | Performed by: EMERGENCY MEDICINE

## 2017-01-29 PROCEDURE — 80053 COMPREHEN METABOLIC PANEL: CPT | Performed by: EMERGENCY MEDICINE

## 2017-01-29 PROCEDURE — 81001 URINALYSIS AUTO W/SCOPE: CPT | Performed by: EMERGENCY MEDICINE

## 2017-01-29 PROCEDURE — 80307 DRUG TEST PRSMV CHEM ANLYZR: CPT | Performed by: EMERGENCY MEDICINE

## 2017-01-29 PROCEDURE — 96375 TX/PRO/DX INJ NEW DRUG ADDON: CPT

## 2017-01-29 PROCEDURE — 36415 COLL VENOUS BLD VENIPUNCTURE: CPT

## 2017-01-29 PROCEDURE — 96374 THER/PROPH/DIAG INJ IV PUSH: CPT

## 2017-01-29 PROCEDURE — 25010000002 ONDANSETRON PER 1 MG: Performed by: EMERGENCY MEDICINE

## 2017-01-29 PROCEDURE — 99284 EMERGENCY DEPT VISIT MOD MDM: CPT

## 2017-01-29 RX ORDER — DIFLUNISAL 500 MG/1
500 TABLET, FILM COATED ORAL 2 TIMES DAILY
Qty: 20 TABLET | Refills: 0 | Status: SHIPPED | OUTPATIENT
Start: 2017-01-29 | End: 2017-04-27

## 2017-01-29 RX ORDER — ONDANSETRON 4 MG/1
4 TABLET, ORALLY DISINTEGRATING ORAL 4 TIMES DAILY
Qty: 15 TABLET | Refills: 0 | Status: SHIPPED | OUTPATIENT
Start: 2017-01-29 | End: 2017-07-27

## 2017-01-29 RX ORDER — SODIUM CHLORIDE 9 MG/ML
125 INJECTION, SOLUTION INTRAVENOUS CONTINUOUS
Status: DISCONTINUED | OUTPATIENT
Start: 2017-01-29 | End: 2017-01-30 | Stop reason: HOSPADM

## 2017-01-29 RX ORDER — ONDANSETRON 2 MG/ML
4 INJECTION INTRAMUSCULAR; INTRAVENOUS ONCE
Status: COMPLETED | OUTPATIENT
Start: 2017-01-29 | End: 2017-01-29

## 2017-01-29 RX ORDER — SODIUM CHLORIDE 0.9 % (FLUSH) 0.9 %
10 SYRINGE (ML) INJECTION AS NEEDED
Status: DISCONTINUED | OUTPATIENT
Start: 2017-01-29 | End: 2017-01-30 | Stop reason: HOSPADM

## 2017-01-29 RX ADMIN — SODIUM CHLORIDE 500 ML: 9 INJECTION, SOLUTION INTRAVENOUS at 20:32

## 2017-01-29 RX ADMIN — ONDANSETRON 4 MG: 2 INJECTION INTRAMUSCULAR; INTRAVENOUS at 20:48

## 2017-01-29 RX ADMIN — BUTORPHANOL TARTRATE 1 MG: 2 INJECTION, SOLUTION INTRAMUSCULAR; INTRAVENOUS at 20:46

## 2017-01-30 ENCOUNTER — LAB (OUTPATIENT)
Dept: LAB | Facility: HOSPITAL | Age: 42
End: 2017-01-30

## 2017-01-30 ENCOUNTER — OFFICE VISIT (OUTPATIENT)
Dept: UROLOGY | Facility: CLINIC | Age: 42
End: 2017-01-30

## 2017-01-30 ENCOUNTER — TRANSCRIBE ORDERS (OUTPATIENT)
Dept: ADMINISTRATIVE | Facility: HOSPITAL | Age: 42
End: 2017-01-30

## 2017-01-30 VITALS
SYSTOLIC BLOOD PRESSURE: 135 MMHG | BODY MASS INDEX: 19.99 KG/M2 | HEART RATE: 75 BPM | WEIGHT: 135 LBS | DIASTOLIC BLOOD PRESSURE: 88 MMHG | HEIGHT: 69 IN

## 2017-01-30 DIAGNOSIS — R31.0 GROSS HEMATURIA: ICD-10-CM

## 2017-01-30 DIAGNOSIS — R10.2 SUPRAPUBIC PAIN: ICD-10-CM

## 2017-01-30 DIAGNOSIS — Z20.2 VENEREAL DISEASE CONTACT: Primary | ICD-10-CM

## 2017-01-30 DIAGNOSIS — N32.81 OVERACTIVE BLADDER: ICD-10-CM

## 2017-01-30 DIAGNOSIS — N39.0 URINARY TRACT INFECTION, SITE UNSPECIFIED: Primary | ICD-10-CM

## 2017-01-30 DIAGNOSIS — Z20.2 VENEREAL DISEASE CONTACT: ICD-10-CM

## 2017-01-30 LAB
BILIRUB BLD-MCNC: NEGATIVE MG/DL
CLARITY, POC: CLEAR
COLOR UR: YELLOW
GLUCOSE UR STRIP-MCNC: NEGATIVE MG/DL
HCV AB SER DONR QL: REACTIVE
HIV1+2 AB SER QL: NORMAL
KETONES UR QL: NEGATIVE
LEUKOCYTE EST, POC: ABNORMAL
NITRITE UR-MCNC: NEGATIVE MG/ML
PH UR: 7 [PH] (ref 5–8)
PROT UR STRIP-MCNC: NEGATIVE MG/DL
RBC # UR STRIP: ABNORMAL /UL
SP GR UR: 1.02 (ref 1–1.03)
UROBILINOGEN UR QL: NORMAL

## 2017-01-30 PROCEDURE — 99203 OFFICE O/P NEW LOW 30 MIN: CPT | Performed by: NURSE PRACTITIONER

## 2017-01-30 PROCEDURE — 36415 COLL VENOUS BLD VENIPUNCTURE: CPT

## 2017-01-30 PROCEDURE — 86803 HEPATITIS C AB TEST: CPT | Performed by: NURSE PRACTITIONER

## 2017-01-30 PROCEDURE — 81003 URINALYSIS AUTO W/O SCOPE: CPT | Performed by: NURSE PRACTITIONER

## 2017-01-30 PROCEDURE — G0432 EIA HIV-1/HIV-2 SCREEN: HCPCS | Performed by: NURSE PRACTITIONER

## 2017-01-30 RX ORDER — FESOTERODINE FUMARATE 8 MG/1
8 TABLET, EXTENDED RELEASE ORAL
Qty: 14 TABLET | Refills: 0 | Status: SHIPPED | OUTPATIENT
Start: 2017-01-30 | End: 2017-04-27

## 2017-01-30 NOTE — MR AVS SNAPSHOT
Susanna Camilo   1/30/2017 11:00 AM   Office Visit    Dept Phone:  [phone]   Encounter #:  [id number]    Provider:  MIGDALIA Balderas   Department:  [facility] UROLOGY                Your Full Care Plan              Your Updated Medication List          This list is accurate as of: 1/30/17 11:57 AM.  Always use your most recent med list.                azelastine 0.15 % solution nasal spray   Commonly known as:  ASTEPRO   2 sprays into each nostril 2 (Two) Times a Day.       busPIRone 15 MG tablet   Commonly known as:  BUSPAR       butalbital-acetaminophen-caffeine -40 MG per tablet   Commonly known as:  FIORICET   Take 1-2 tablets by mouth Every 6 (Six) Hours As Needed for headaches or migraine.       carbidopa-levodopa  MG per tablet   Commonly known as:  SINEMET       diflunisal 500 MG tablet tablet   Commonly known as:  DOLOBID   Take 1 tablet by mouth 2 (Two) Times a Day.       divalproex 500 MG DR tablet   Commonly known as:  DEPAKOTE       famotidine 40 MG tablet   Commonly known as:  PEPCID       nitrofurantoin (macrocrystal-monohydrate) 100 MG capsule   Commonly known as:  MACROBID   Take 1 capsule by mouth 2 (Two) Times a Day.       ondansetron ODT 4 MG disintegrating tablet   Commonly known as:  ZOFRAN-ODT   Take 1 tablet by mouth 4 (Four) Times a Day.       pantoprazole 40 MG EC tablet   Commonly known as:  PROTONIX       phenazopyridine 200 MG tablet   Commonly known as:  PYRIDIUM   Take 1 tablet by mouth 3 (Three) Times a Day As Needed for bladder spasms.       sertraline 100 MG tablet   Commonly known as:  ZOLOFT               We Performed the Following     POC Urinalysis Dipstick, Automated       You Were Diagnosed With        Codes Comments    Urinary tract infection, site unspecified    -  Primary ICD-10-CM: N39.0       Instructions     None    Patient Instructions History      Upcoming Appointments     Visit Type Date Time  "Department    NEW PATIENT 2017 11:00 AM [facility]    IN OFFICE PROCEDURE 2017 10:30 AM [facility]      [employer] Signup     [facility] [employer] allows you to send messages to your doctor, view your test results, renew your prescriptions, schedule appointments, and more. To sign up, go to [employer] and click on the Sign Up Now link in the New User? box. Enter your [employer] Activation Code exactly as it appears below along with the last four digits of your Social Security Number and your Date of Birth () to complete the sign-up process. If you do not sign up before the expiration date, you must request a new code.    [employer] Activation Code: [email or web address]  Expires: 2017  3:31 PM    If you have questions, you can email [email or web address] or call [phone] to talk to our [employer] staff. Remember, [employer] is NOT to be used for urgent needs. For medical emergencies, dial 911.               Other Info from Your Visit           Your Appointments     2017 10:30 AM EST   IN OFFICE PROCEDURE with [facility]   [facility] (--)    [address] [place] [address] [phone]           Bring medication list, test results, and radiology films that apply.              Allergies     Ativan [Lorazepam]  Hallucinations    confusion    Sulfa Antibiotics  Shortness Of Breath, Swelling    Compazine [Prochlorperazine Edisylate]  Hives    Demerol [Meperidine]  Hives    Droperidol      Prochlorperazine      Toradol [Ketorolac Tromethamine]  Hives, Itching      Reason for Visit     Urinary Tract Infection           Vital Signs     Blood Pressure Pulse Height Weight Last Menstrual Period Body Mass Index    135/88 75 69\" (175.3 cm) 135 lb (61.2 kg) (LMP Unknown) 19.94 kg/m2    Smoking Status                   Former Smoker           Problems and Diagnoses Noted     Urinary tract infection    -  Primary      Results     POC " Urinalysis Dipstick, Automated      Component Value Standard Range & Units    Color Yellow Yellow, Straw, Dark Yellow, Fe    Clarity, UA Clear Clear    Glucose, UA Negative Negative, 1000 mg/dL (3+) mg/dL    Bilirubin Negative Negative    Ketones, UA Negative Negative    Specific Gravity  1.020 1.005 - 1.030    Blood, UA Large Negative    pH, Urine 7.0 5.0 - 8.0    Protein, POC Negative Negative mg/dL    Urobilinogen, UA Normal Normal    Leukocytes Small (1+) Negative    Nitrite, UA Negative Negative

## 2017-01-30 NOTE — PROGRESS NOTES
Chief Complaint:          Chief Complaint   Patient presents with   • Urinary Tract Infection       HPI:   42 y.o. female being seen today for bladder pain with episodes of gross hematuria.She has been seen at [facility] ED several times for history of migraine headaches and blood in her urine.  She had a renal ultrasound completed on 1/21/2017 revealing normal results without evidence of stones or hydronephrosis.  She does complain of suprapubic pain and urinary frequency.  Urine culture at the ED revealed normal urogenital eugenio.  UA today shows a large amount of blood.  She is a former smoker x 15 years.  HPI        Past Medical History:        Past Medical History   Diagnosis Date   • Abdominal pain    • Abdominal swelling    • Bipolar 1 disorder    • Brain tumor      R Frontal Lobe per pt   • Constipation    • Diarrhea    • Fibromyalgia    • IBS (irritable bowel syndrome)    • Migraine    • Nausea & vomiting    • PONV (postoperative nausea and vomiting)    • PTSD (post-traumatic stress disorder)    • Rectal bleeding          Current Meds:     Current Outpatient Prescriptions   Medication Sig Dispense Refill   • azelastine (ASTEPRO) 0.15 % solution nasal spray 2 sprays into each nostril 2 (Two) Times a Day. 30 mL 0   • busPIRone (BUSPAR) 15 MG tablet Take 15 mg by mouth 3 (three) times a day        • butalbital-acetaminophen-caffeine (FIORICET) -40 MG per tablet Take 1-2 tablets by mouth Every 6 (Six) Hours As Needed for headaches or migraine. 20 tablet 0   • carbidopa-levodopa (SINEMET)  MG per tablet Take 1 tablet by mouth 3 (Three) Times a Day.     • diflunisal (DOLOBID) 500 MG tablet tablet Take 1 tablet by mouth 2 (Two) Times a Day. 20 tablet 0   • divalproex (DEPAKOTE) 500 MG DR tablet Take 500 mg by mouth 2 (Two) Times a Day.     • famotidine (PEPCID) 40 MG tablet Take 40 mg by mouth 2 (Two) Times a Day As Needed for heartburn.     • nitrofurantoin, macrocrystal-monohydrate, (MACROBID) 100  MG capsule Take 1 capsule by mouth 2 (Two) Times a Day. 14 capsule 0   • ondansetron ODT (ZOFRAN-ODT) 4 MG disintegrating tablet Take 1 tablet by mouth 4 (Four) Times a Day. 15 tablet 0   • pantoprazole (PROTONIX) 40 MG EC tablet Take 40 mg by mouth Daily As Needed.     • phenazopyridine (PYRIDIUM) 200 MG tablet Take 1 tablet by mouth 3 (Three) Times a Day As Needed for bladder spasms. 12 tablet 0   • sertraline (ZOLOFT) 100 MG tablet Take 150 mg by mouth daily.       No current facility-administered medications for this visit.         Allergies:      Allergies   Allergen Reactions   • Ativan [Lorazepam] Hallucinations     confusion   • Sulfa Antibiotics Shortness Of Breath and Swelling   • Compazine [Prochlorperazine Edisylate] Hives   • Demerol [Meperidine] Hives   • Droperidol    • Prochlorperazine    • Toradol [Ketorolac Tromethamine] Hives and Itching        Past Surgical History:     Past Surgical History   Procedure Laterality Date   • Hysterectomy     • Appendectomy     • Shoulder surgery       3 times   • Knee surgery     • Endoscopy N/A 6/30/2016     Procedure: ESOPHAGOGASTRODUODENOSCOPY WITH BIOPSY  CPTCODE:12301;  Surgeon: Jose Antonio Belle III, MD;  Location: [facility] OR;  Service:    • Colonoscopy N/A 6/30/2016     Procedure: COLONOSCOPY  CPTCODE:43549;  Surgeon: Jose Antonio Belle III, MD;  Location: [facility] OR;  Service:    • Colonoscopy N/A 7/7/2016     Procedure: COLONOSCOPY (68057) CPT;  Surgeon: Jose Antonio Belle III, MD;  Location: [facility] OR;  Service:    • Hemorrhoidectomy N/A 7/28/2016     Procedure: HEMORRHOID STAPLING;  Surgeon: Kael Lopez MD;  Location: [facility] OR;  Service:    • Anal scope N/A 7/28/2016     Procedure: ANAL SCOPE;  Surgeon: Kael Lopez MD;  Location: [facility] OR;  Service:          Social History:     Social History     Social History   • Marital status:      Spouse name: N/A   • Number of children: N/A   • Years of education: N/A     Occupational History    • Not on file.     Social History Main Topics   • Smoking status: Former Smoker     Types: Cigarettes     Quit date: 11/1/2015   • Smokeless tobacco: Never Used   • Alcohol use No   • Drug use: Yes     Special: Marijuana   • Sexual activity: Defer     Other Topics Concern   • Not on file     Social History Narrative       Family History:     Family History   Problem Relation Age of Onset   • Crohn's disease Other    • Hypertension Other    • Diabetes Other    • Irritable bowel syndrome Other        Review of Systems:     Review of Systems   Constitutional: Positive for chills and fatigue. Negative for fever.   Respiratory: Negative for cough, shortness of breath and wheezing.    Cardiovascular: Negative for leg swelling.   Gastrointestinal: Positive for abdominal pain, nausea and vomiting.   Genitourinary: Positive for hematuria.   Musculoskeletal: Positive for back pain. Negative for joint swelling.   Neurological: Positive for headaches. Negative for dizziness.   Psychiatric/Behavioral: Negative for confusion.       Physical Exam:     Physical Exam   Constitutional: She is oriented to person, place, and time. She appears well-developed and well-nourished. No distress.   Abdominal: Soft. Bowel sounds are normal. She exhibits no distension and no mass. There is no rebound and no guarding. No hernia.   Genitourinary:   Genitourinary Comments: Status post hysterectomy   Musculoskeletal: Normal range of motion.   Neurological: She is alert and oriented to person, place, and time.   Skin: Skin is warm and dry. No rash noted. She is not diaphoretic. No erythema. No pallor.   Psychiatric: She has a normal mood and affect. Her behavior is normal. Judgment and thought content normal.   Nursing note and vitals reviewed.      Procedure:     No notes on file      Assessment:     Encounter Diagnoses   Name Primary?   • Urinary tract infection, site unspecified Yes   • Gross hematuria    • Suprapubic pain    • Overactive  bladder      Orders Placed This Encounter   Procedures   • POC Urinalysis Dipstick, Automated       Plan:   Samples of Toviaz 8 mg given for OAB symptoms.  Will schedule her for a cystoscopy with Dr. Blunt to evaluate the cause of the gross hematuria.  Discussed the procedure with the patient and she is agreeable.      Counseling was given to patient and family for the following topics diagnostic results, patient and family education, impressions and risks and benefits of treatment options. and the interim medical history and current results were reviewed.  A treatment plan with follow-up was made. Total time of the encounter was 32 minutes and 32 minutes were spent discussing Urinary tract infection, site unspecified [N39.0] face-to-face.       This document has been electronically signed by MIGDALIA Hebert January 30, 2017 12:17 PM

## 2017-01-30 NOTE — PATIENT INSTRUCTIONS
Hematuria, Adult  Hematuria is blood in your urine. It can be caused by a bladder infection, kidney infection, prostate infection, kidney stone, or cancer of your urinary tract. Infections can usually be treated with medicine, and a kidney stone usually will pass through your urine. If neither of these is the cause of your hematuria, further workup to find out the reason may be needed.  It is very important that you tell your health care provider about any blood you see in your urine, even if the blood stops without treatment or happens without causing pain. Blood in your urine that happens and then stops and then happens again can be a symptom of a very serious condition. Also, pain is not a symptom in the initial stages of many urinary cancers.  HOME CARE INSTRUCTIONS   · Drink lots of fluid, 3-4 quarts a day. If you have been diagnosed with an infection, cranberry juice is especially recommended, in addition to large amounts of water.  · Avoid caffeine, tea, and carbonated beverages because they tend to irritate the bladder.  · Avoid alcohol because it may irritate the prostate.  · Take all medicines as directed by your health care provider.  · If you were prescribed an antibiotic medicine, finish it all even if you start to feel better.  · If you have been diagnosed with a kidney stone, follow your health care provider's instructions regarding straining your urine to catch the stone.  · Empty your bladder often. Avoid holding urine for long periods of time.  · After a bowel movement, women should cleanse front to back. Use each tissue only once.  · Empty your bladder before and after sexual intercourse if you are a female.  SEEK MEDICAL CARE IF:  · You develop back pain.  · You have a fever.  · You have a feeling of sickness in your stomach (nausea) or vomiting.  · Your symptoms are not better in 3 days. Return sooner if you are getting worse.  SEEK IMMEDIATE MEDICAL CARE IF:   · You develop severe vomiting and  are unable to keep the medicine down.  · You develop severe back or abdominal pain despite taking your medicines.  · You begin passing a large amount of blood or clots in your urine.  · You feel extremely weak or faint, or you pass out.  MAKE SURE YOU:   · Understand these instructions.  · Will watch your condition.  · Will get help right away if you are not doing well or get worse.     This information is not intended to replace advice given to you by your health care provider. Make sure you discuss any questions you have with your health care provider.     Document Released: 12/18/2006 Document Revised: 01/08/2016 Document Reviewed: 08/18/2014  [employer] Patient Education ©2016 [employer]

## 2017-01-31 ENCOUNTER — TRANSCRIBE ORDERS (OUTPATIENT)
Dept: INFUSION THERAPY | Facility: HOSPITAL | Age: 42
End: 2017-01-31

## 2017-01-31 ENCOUNTER — HOSPITAL ENCOUNTER (EMERGENCY)
Facility: HOSPITAL | Age: 42
Discharge: LEFT BEFORE DISCHARGE | End: 2017-01-31
Attending: EMERGENCY MEDICINE | Admitting: EMERGENCY MEDICINE

## 2017-01-31 ENCOUNTER — APPOINTMENT (OUTPATIENT)
Dept: INFUSION THERAPY | Facility: HOSPITAL | Age: 42
End: 2017-01-31
Attending: INTERNAL MEDICINE

## 2017-01-31 VITALS
SYSTOLIC BLOOD PRESSURE: 130 MMHG | TEMPERATURE: 97.4 F | RESPIRATION RATE: 18 BRPM | HEIGHT: 69 IN | HEART RATE: 83 BPM | OXYGEN SATURATION: 98 % | DIASTOLIC BLOOD PRESSURE: 91 MMHG | WEIGHT: 153 LBS | BODY MASS INDEX: 22.66 KG/M2

## 2017-01-31 DIAGNOSIS — G43.009 MIGRAINE WITHOUT AURA AND WITHOUT STATUS MIGRAINOSUS, NOT INTRACTABLE: Primary | ICD-10-CM

## 2017-01-31 DIAGNOSIS — G43.709 TRANSFORMED MIGRAINE WITHOUT AURA: Primary | ICD-10-CM

## 2017-01-31 PROCEDURE — 99283 EMERGENCY DEPT VISIT LOW MDM: CPT

## 2017-01-31 RX ORDER — PROMETHAZINE HYDROCHLORIDE 25 MG/ML
50 INJECTION, SOLUTION INTRAMUSCULAR; INTRAVENOUS ONCE
Status: DISCONTINUED | OUTPATIENT
Start: 2017-01-31 | End: 2017-01-31 | Stop reason: HOSPADM

## 2017-01-31 RX ORDER — DEXAMETHASONE SODIUM PHOSPHATE 4 MG/ML
8 INJECTION, SOLUTION INTRA-ARTICULAR; INTRALESIONAL; INTRAMUSCULAR; INTRAVENOUS; SOFT TISSUE ONCE
Status: DISCONTINUED | OUTPATIENT
Start: 2017-01-31 | End: 2017-01-31 | Stop reason: HOSPADM

## 2017-02-01 ENCOUNTER — PROCEDURE VISIT (OUTPATIENT)
Dept: UROLOGY | Facility: CLINIC | Age: 42
End: 2017-02-01

## 2017-02-01 DIAGNOSIS — N39.0 RECURRENT UTI: Primary | ICD-10-CM

## 2017-02-01 DIAGNOSIS — R31.0 GROSS HEMATURIA: ICD-10-CM

## 2017-02-01 LAB
BILIRUB BLD-MCNC: NEGATIVE MG/DL
CLARITY, POC: CLEAR
COLOR UR: YELLOW
GLUCOSE UR STRIP-MCNC: NEGATIVE MG/DL
KETONES UR QL: NEGATIVE
LEUKOCYTE EST, POC: NEGATIVE
NITRITE UR-MCNC: NEGATIVE MG/ML
PH UR: 5.5 [PH] (ref 5–8)
PROT UR STRIP-MCNC: ABNORMAL MG/DL
RBC # UR STRIP: NEGATIVE /UL
SP GR UR: 1.02 (ref 1–1.03)
UROBILINOGEN UR QL: NORMAL

## 2017-02-01 PROCEDURE — 52000 CYSTOURETHROSCOPY: CPT | Performed by: UROLOGY

## 2017-02-01 PROCEDURE — 99213 OFFICE O/P EST LOW 20 MIN: CPT | Performed by: UROLOGY

## 2017-02-01 PROCEDURE — 96372 THER/PROPH/DIAG INJ SC/IM: CPT | Performed by: UROLOGY

## 2017-02-01 PROCEDURE — 81003 URINALYSIS AUTO W/O SCOPE: CPT | Performed by: UROLOGY

## 2017-02-01 RX ORDER — OXYBUTYNIN CHLORIDE 5 MG/1
5 TABLET ORAL DAILY
Qty: 30 TABLET | Refills: 3 | Status: SHIPPED | OUTPATIENT
Start: 2017-02-01 | End: 2017-03-03

## 2017-02-01 RX ORDER — GENTAMICIN SULFATE 40 MG/ML
80 INJECTION, SOLUTION INTRAMUSCULAR; INTRAVENOUS ONCE
Status: COMPLETED | OUTPATIENT
Start: 2017-02-01 | End: 2017-02-01

## 2017-02-01 RX ADMIN — GENTAMICIN SULFATE 80 MG: 40 INJECTION, SOLUTION INTRAMUSCULAR; INTRAVENOUS at 15:25
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
## 2017-02-12 ENCOUNTER — HOSPITAL ENCOUNTER (EMERGENCY)
Facility: HOSPITAL | Age: 42
Discharge: HOME | End: 2017-02-12
Attending: EMERGENCY MEDICINE | Admitting: EMERGENCY MEDICINE

## 2017-02-12 VITALS
HEIGHT: 69 IN | HEART RATE: 88 BPM | BODY MASS INDEX: 22.66 KG/M2 | TEMPERATURE: 98 F | RESPIRATION RATE: 18 BRPM | SYSTOLIC BLOOD PRESSURE: 118 MMHG | OXYGEN SATURATION: 99 % | DIASTOLIC BLOOD PRESSURE: 79 MMHG | WEIGHT: 153 LBS

## 2017-02-12 DIAGNOSIS — R31.0 GROSS HEMATURIA: Primary | ICD-10-CM

## 2017-02-12 DIAGNOSIS — R10.9 FLANK PAIN, ACUTE: ICD-10-CM

## 2017-02-12 LAB
ALBUMIN SERPL-MCNC: 4.4 G/DL (ref 3.5–5)
ALBUMIN/GLOB SERPL: 1.4 G/DL (ref 1.5–2.5)
ALP SERPL-CCNC: 70 U/L (ref 46–116)
ALT SERPL W P-5'-P-CCNC: 39 U/L (ref 10–36)
AMPHET+METHAMPHET UR QL: NEGATIVE
ANION GAP SERPL CALCULATED.3IONS-SCNC: 2.4 MMOL/L (ref 3.6–11.2)
AST SERPL-CCNC: 56 U/L (ref 10–30)
BACTERIA UR QL AUTO: ABNORMAL /HPF
BARBITURATES UR QL SCN: NEGATIVE
BASOPHILS # BLD AUTO: 0.01 10*3/MM3 (ref 0–0.3)
BASOPHILS NFR BLD AUTO: 0.2 % (ref 0–2)
BENZODIAZ UR QL SCN: NEGATIVE
BILIRUB SERPL-MCNC: 0.5 MG/DL (ref 0.2–1.8)
BILIRUB UR QL STRIP: NEGATIVE
BUN BLD-MCNC: 10 MG/DL (ref 7–21)
BUN/CREAT SERPL: 15.9 (ref 7–25)
CALCIUM SPEC-SCNC: 10.1 MG/DL (ref 7.7–10)
CANNABINOIDS SERPL QL: POSITIVE
CHLORIDE SERPL-SCNC: 108 MMOL/L (ref 99–112)
CLARITY UR: ABNORMAL
CO2 SERPL-SCNC: 30.6 MMOL/L (ref 24.3–31.9)
COCAINE UR QL: NEGATIVE
COLOR UR: ABNORMAL
CREAT BLD-MCNC: 0.63 MG/DL (ref 0.43–1.29)
DEPRECATED RDW RBC AUTO: 43.7 FL (ref 37–54)
EOSINOPHIL # BLD AUTO: 0.15 10*3/MM3 (ref 0–0.7)
EOSINOPHIL NFR BLD AUTO: 2.4 % (ref 0–5)
ERYTHROCYTE [DISTWIDTH] IN BLOOD BY AUTOMATED COUNT: 13.1 % (ref 11.5–14.5)
GFR SERPL CREATININE-BSD FRML MDRD: 104 ML/MIN/1.73
GLOBULIN UR ELPH-MCNC: 3.1 GM/DL
GLUCOSE BLD-MCNC: 85 MG/DL (ref 70–110)
GLUCOSE UR STRIP-MCNC: NEGATIVE MG/DL
HCT VFR BLD AUTO: 41.8 % (ref 37–47)
HGB BLD-MCNC: 14 G/DL (ref 12–16)
HGB UR QL STRIP.AUTO: ABNORMAL
IMM GRANULOCYTES # BLD: 0.01 10*3/MM3 (ref 0–0.03)
IMM GRANULOCYTES NFR BLD: 0.2 % (ref 0–0.5)
KETONES UR QL STRIP: NEGATIVE
LEUKOCYTE ESTERASE UR QL STRIP.AUTO: ABNORMAL
LYMPHOCYTES # BLD AUTO: 2.61 10*3/MM3 (ref 1–3)
LYMPHOCYTES NFR BLD AUTO: 41.6 % (ref 21–51)
MCH RBC QN AUTO: 31.7 PG (ref 27–33)
MCHC RBC AUTO-ENTMCNC: 33.5 G/DL (ref 33–37)
MCV RBC AUTO: 94.6 FL (ref 80–94)
METHADONE UR QL SCN: NEGATIVE
MONOCYTES # BLD AUTO: 0.52 10*3/MM3 (ref 0.1–0.9)
MONOCYTES NFR BLD AUTO: 8.3 % (ref 0–10)
NEUTROPHILS # BLD AUTO: 2.97 10*3/MM3 (ref 1.4–6.5)
NEUTROPHILS NFR BLD AUTO: 47.3 % (ref 30–70)
NITRITE UR QL STRIP: NEGATIVE
OPIATES UR QL: NEGATIVE
OSMOLALITY SERPL CALC.SUM OF ELEC: 279.6 MOSM/KG (ref 273–305)
PCP UR QL SCN: NEGATIVE
PH UR STRIP.AUTO: <=5 [PH] (ref 5–8)
PLATELET # BLD AUTO: 137 10*3/MM3 (ref 130–400)
PMV BLD AUTO: 10.3 FL (ref 6–10)
POTASSIUM BLD-SCNC: 4.7 MMOL/L (ref 3.5–5.3)
PROPOXYPH UR QL: NEGATIVE
PROT SERPL-MCNC: 7.5 G/DL (ref 6–8)
PROT UR QL STRIP: ABNORMAL
RBC # BLD AUTO: 4.42 10*6/MM3 (ref 4.2–5.4)
RBC # UR: ABNORMAL /HPF
REF LAB TEST METHOD: ABNORMAL
SODIUM BLD-SCNC: 141 MMOL/L (ref 135–153)
SP GR UR STRIP: 1.01 (ref 1–1.03)
SQUAMOUS #/AREA URNS HPF: ABNORMAL /HPF
UROBILINOGEN UR QL STRIP: ABNORMAL
WBC NRBC COR # BLD: 6.27 10*3/MM3 (ref 4.5–12.5)
WBC UR QL AUTO: ABNORMAL /HPF

## 2017-02-12 PROCEDURE — 80307 DRUG TEST PRSMV CHEM ANLYZR: CPT | Performed by: PHYSICIAN ASSISTANT

## 2017-02-12 PROCEDURE — 25010000002 ONDANSETRON PER 1 MG: Performed by: EMERGENCY MEDICINE

## 2017-02-12 PROCEDURE — 96375 TX/PRO/DX INJ NEW DRUG ADDON: CPT

## 2017-02-12 PROCEDURE — 81001 URINALYSIS AUTO W/SCOPE: CPT | Performed by: EMERGENCY MEDICINE

## 2017-02-12 PROCEDURE — 87086 URINE CULTURE/COLONY COUNT: CPT | Performed by: EMERGENCY MEDICINE

## 2017-02-12 PROCEDURE — 99283 EMERGENCY DEPT VISIT LOW MDM: CPT

## 2017-02-12 PROCEDURE — 96374 THER/PROPH/DIAG INJ IV PUSH: CPT

## 2017-02-12 PROCEDURE — 96361 HYDRATE IV INFUSION ADD-ON: CPT

## 2017-02-12 PROCEDURE — 85025 COMPLETE CBC W/AUTO DIFF WBC: CPT | Performed by: PHYSICIAN ASSISTANT

## 2017-02-12 PROCEDURE — 80053 COMPREHEN METABOLIC PANEL: CPT | Performed by: PHYSICIAN ASSISTANT

## 2017-02-12 PROCEDURE — 25010000002 MORPHINE PER 10 MG: Performed by: EMERGENCY MEDICINE

## 2017-02-12 RX ORDER — HYDROCODONE BITARTRATE AND ACETAMINOPHEN 5; 325 MG/1; MG/1
1 TABLET ORAL EVERY 6 HOURS PRN
Qty: 12 TABLET | Refills: 0 | Status: SHIPPED | OUTPATIENT
Start: 2017-02-12 | End: 2017-04-27

## 2017-02-12 RX ORDER — ONDANSETRON 2 MG/ML
4 INJECTION INTRAMUSCULAR; INTRAVENOUS ONCE
Status: COMPLETED | OUTPATIENT
Start: 2017-02-12 | End: 2017-02-12

## 2017-02-12 RX ORDER — NITROFURANTOIN 25; 75 MG/1; MG/1
100 CAPSULE ORAL 2 TIMES DAILY
Qty: 14 CAPSULE | Refills: 0 | Status: SHIPPED | OUTPATIENT
Start: 2017-02-12 | End: 2017-02-19

## 2017-02-12 RX ORDER — SODIUM CHLORIDE 0.9 % (FLUSH) 0.9 %
10 SYRINGE (ML) INJECTION AS NEEDED
Status: DISCONTINUED | OUTPATIENT
Start: 2017-02-12 | End: 2017-02-12 | Stop reason: HOSPADM

## 2017-02-12 RX ADMIN — MORPHINE SULFATE 4 MG: 4 INJECTION, SOLUTION INTRAMUSCULAR; INTRAVENOUS at 15:26

## 2017-02-12 RX ADMIN — ONDANSETRON 4 MG: 2 INJECTION INTRAMUSCULAR; INTRAVENOUS at 15:24

## 2017-02-12 RX ADMIN — SODIUM CHLORIDE 1000 ML: 9 INJECTION, SOLUTION INTRAVENOUS at 15:24

## 2017-02-12 NOTE — ED PROVIDER NOTES
Subjective   Patient is a 42 y.o. female presenting with flank pain.   History provided by:  Patient   used: No    Flank Pain   Pain location:  L flank  Pain quality: sharp and stabbing    Pain radiates to:  LLQ  Pain severity:  Moderate  Onset quality:  Sudden  Duration:  2 days  Timing:  Constant  Progression:  Worsening  Chronicity:  New  Context: not alcohol use, not awakening from sleep, not diet changes, not eating, not medication withdrawal, not previous surgeries, not retching and not sick contacts    Relieved by:  Nothing  Worsened by:  Nothing  Associated symptoms: no anorexia, no belching, no chills, no fever, no flatus, no nausea, no sore throat and no vomiting    Risk factors: no aspirin use        Review of Systems   Constitutional: Negative for chills and fever.   HENT: Negative for sore throat.    Gastrointestinal: Negative for abdominal pain, anorexia, flatus, nausea and vomiting.   Genitourinary: Positive for flank pain.   Musculoskeletal: Positive for back pain.   Skin: Negative for rash.   All other systems reviewed and are negative.      Past Medical History   Diagnosis Date   • Abdominal pain    • Abdominal swelling    • Bipolar 1 disorder    • Brain tumor      R Frontal Lobe per pt   • Constipation    • Diarrhea    • Fibromyalgia    • IBS (irritable bowel syndrome)    • Migraine    • Nausea & vomiting    • PONV (postoperative nausea and vomiting)    • PTSD (post-traumatic stress disorder)    • Rectal bleeding        Allergies   Allergen Reactions   • Ativan [Lorazepam] Hallucinations     confusion   • Sulfa Antibiotics Shortness Of Breath and Swelling   • Compazine [Prochlorperazine Edisylate] Hives   • Demerol [Meperidine] Hives   • Droperidol    • Prochlorperazine    • Toradol [Ketorolac Tromethamine] Hives and Itching       Past Surgical History   Procedure Laterality Date   • Hysterectomy     • Appendectomy     • Shoulder surgery       3 times   • Knee surgery     •  Endoscopy N/A 6/30/2016     Procedure: ESOPHAGOGASTRODUODENOSCOPY WITH BIOPSY  CPTCODE:51307;  Surgeon: Jose Antonio Belle III, MD;  Location:  [facility] OR;  Service:    • Colonoscopy N/A 6/30/2016     Procedure: COLONOSCOPY  CPTCODE:10790;  Surgeon: Jose Antonio Belle III, MD;  Location:  [facility] OR;  Service:    • Colonoscopy N/A 7/7/2016     Procedure: COLONOSCOPY (40739) CPT;  Surgeon: Jose Antonio Belle III, MD;  Location:  [facility] OR;  Service:    • Hemorrhoidectomy N/A 7/28/2016     Procedure: HEMORRHOID STAPLING;  Surgeon: Kael Lopez MD;  Location:  [facility] OR;  Service:    • Anal scope N/A 7/28/2016     Procedure: ANAL SCOPE;  Surgeon: Kael Lopez MD;  Location: [facility] OR;  Service:        Family History   Problem Relation Age of Onset   • Crohn's disease Other    • Hypertension Other    • Diabetes Other    • Irritable bowel syndrome Other        Social History     Social History   • Marital status:      Spouse name: N/A   • Number of children: N/A   • Years of education: N/A     Social History Main Topics   • Smoking status: Former Smoker     Types: Cigarettes     Quit date: 11/1/2015   • Smokeless tobacco: Never Used   • Alcohol use No   • Drug use: Yes     Special: Marijuana   • Sexual activity: Defer     Other Topics Concern   • None     Social History Narrative           Objective   Physical Exam   Constitutional: She is oriented to person, place, and time. She appears well-developed and well-nourished.   HENT:   Head: Normocephalic.   Right Ear: External ear normal.   Left Ear: External ear normal.   Nose: Nose normal.   Mouth/Throat: Oropharynx is clear and moist.   Eyes: Conjunctivae and EOM are normal. Pupils are equal, round, and reactive to light.   Neck: Normal range of motion. Neck supple. No tracheal deviation present. No thyromegaly present.   Cardiovascular: Normal rate, regular rhythm, normal heart sounds and intact distal pulses.    Pulmonary/Chest: Effort normal and breath  sounds normal.   Abdominal: Soft. Bowel sounds are normal.   Musculoskeletal: Normal range of motion.   Neurological: She is alert and oriented to person, place, and time. She has normal reflexes.   Skin: Skin is warm and dry.   Psychiatric: She has a normal mood and affect. Her behavior is normal. Judgment and thought content normal.   Nursing note and vitals reviewed.      Procedures         ED Course  ED Course   Comment By Time   This is a 42-year-old female that comes in with chief complaint left flank pain described as sharp, stabbing pain she does state the pain radiates down to her left lower abdomen and pelvic region.  Patient has had nausea without vomiting.  No reported fever, chills, body aches.  Patient does report or history of kidney stones.  She also reports history of fibromyalgia. Mark Stubbs PA-C 02/12 1512                  MDM  Number of Diagnoses or Management Options  Flank pain, acute: new and requires workup  Gross hematuria: new and requires workup     Amount and/or Complexity of Data Reviewed  Clinical lab tests: reviewed and ordered  Tests in the radiology section of CPT®: ordered and reviewed    Risk of Complications, Morbidity, and/or Mortality  Presenting problems: moderate  Diagnostic procedures: moderate  Management options: moderate    Patient Progress  Patient progress: stable      Final diagnoses:   Gross hematuria   Flank pain, acute            Mark Stubbs PA-C  02/12/17 2132

## 2017-02-12 NOTE — ED NOTES
Pt reports left flank pain has worsened, rates pain 6/10, pa notified.     Daina Song RN  02/12/17 7444

## 2017-02-14 LAB — BACTERIA SPEC AEROBE CULT: NORMAL

## 2017-02-18 ENCOUNTER — HOSPITAL ENCOUNTER (EMERGENCY)
Facility: HOSPITAL | Age: 42
Discharge: HOME | End: 2017-02-18
Attending: EMERGENCY MEDICINE | Admitting: EMERGENCY MEDICINE

## 2017-02-18 ENCOUNTER — APPOINTMENT (OUTPATIENT)
Dept: CT IMAGING | Facility: HOSPITAL | Age: 42
End: 2017-02-18

## 2017-02-18 VITALS
HEIGHT: 69 IN | OXYGEN SATURATION: 98 % | DIASTOLIC BLOOD PRESSURE: 79 MMHG | WEIGHT: 153 LBS | TEMPERATURE: 98 F | SYSTOLIC BLOOD PRESSURE: 121 MMHG | RESPIRATION RATE: 16 BRPM | BODY MASS INDEX: 22.66 KG/M2 | HEART RATE: 82 BPM

## 2017-02-18 DIAGNOSIS — N39.0 ACUTE UTI (URINARY TRACT INFECTION): Primary | ICD-10-CM

## 2017-02-18 LAB
ALBUMIN SERPL-MCNC: 4.3 G/DL (ref 3.5–5)
ALBUMIN/GLOB SERPL: 1.3 G/DL (ref 1.5–2.5)
ALP SERPL-CCNC: 68 U/L (ref 35–104)
ALT SERPL W P-5'-P-CCNC: 90 U/L (ref 10–36)
AMPHET+METHAMPHET UR QL: NEGATIVE
ANION GAP SERPL CALCULATED.3IONS-SCNC: 0.6 MMOL/L (ref 3.6–11.2)
AST SERPL-CCNC: 85 U/L (ref 10–30)
BACTERIA UR QL AUTO: ABNORMAL /HPF
BARBITURATES UR QL SCN: NEGATIVE
BASOPHILS # BLD AUTO: 0.01 10*3/MM3 (ref 0–0.3)
BASOPHILS NFR BLD AUTO: 0.2 % (ref 0–2)
BENZODIAZ UR QL SCN: NEGATIVE
BILIRUB SERPL-MCNC: 0.8 MG/DL (ref 0.2–1.8)
BILIRUB UR QL STRIP: NEGATIVE
BUN BLD-MCNC: 12 MG/DL (ref 7–21)
BUN/CREAT SERPL: 19.4 (ref 7–25)
BUPRENORPHINE+NOR UR QL SCN: NEGATIVE
CALCIUM SPEC-SCNC: 9.3 MG/DL (ref 7.7–10)
CANNABINOIDS SERPL QL: POSITIVE
CHLORIDE SERPL-SCNC: 107 MMOL/L (ref 99–112)
CLARITY UR: ABNORMAL
CO2 SERPL-SCNC: 29.4 MMOL/L (ref 24.3–31.9)
COCAINE UR QL: NEGATIVE
COLOR UR: ABNORMAL
CREAT BLD-MCNC: 0.62 MG/DL (ref 0.43–1.29)
DEPRECATED RDW RBC AUTO: 42.8 FL (ref 37–54)
EOSINOPHIL # BLD AUTO: 0.08 10*3/MM3 (ref 0–0.7)
EOSINOPHIL NFR BLD AUTO: 1.6 % (ref 0–5)
ERYTHROCYTE [DISTWIDTH] IN BLOOD BY AUTOMATED COUNT: 12.8 % (ref 11.5–14.5)
GFR SERPL CREATININE-BSD FRML MDRD: 106 ML/MIN/1.73
GLOBULIN UR ELPH-MCNC: 3.3 GM/DL
GLUCOSE BLD-MCNC: 95 MG/DL (ref 70–110)
GLUCOSE UR STRIP-MCNC: NEGATIVE MG/DL
HCT VFR BLD AUTO: 41.4 % (ref 37–47)
HGB BLD-MCNC: 13.9 G/DL (ref 12–16)
HGB UR QL STRIP.AUTO: ABNORMAL
HYALINE CASTS UR QL AUTO: ABNORMAL /LPF
IMM GRANULOCYTES # BLD: 0.01 10*3/MM3 (ref 0–0.03)
IMM GRANULOCYTES NFR BLD: 0.2 % (ref 0–0.5)
KETONES UR QL STRIP: NEGATIVE
LEUKOCYTE ESTERASE UR QL STRIP.AUTO: ABNORMAL
LYMPHOCYTES # BLD AUTO: 1.83 10*3/MM3 (ref 1–3)
LYMPHOCYTES NFR BLD AUTO: 35.8 % (ref 21–51)
MCH RBC QN AUTO: 32 PG (ref 27–33)
MCHC RBC AUTO-ENTMCNC: 33.6 G/DL (ref 33–37)
MCV RBC AUTO: 95.2 FL (ref 80–94)
METHADONE UR QL SCN: NEGATIVE
MONOCYTES # BLD AUTO: 0.33 10*3/MM3 (ref 0.1–0.9)
MONOCYTES NFR BLD AUTO: 6.5 % (ref 0–10)
NEUTROPHILS # BLD AUTO: 2.85 10*3/MM3 (ref 1.4–6.5)
NEUTROPHILS NFR BLD AUTO: 55.7 % (ref 30–70)
NITRITE UR QL STRIP: NEGATIVE
OPIATES UR QL: POSITIVE
OSMOLALITY SERPL CALC.SUM OF ELEC: 273.4 MOSM/KG (ref 273–305)
PCP UR QL SCN: NEGATIVE
PH UR STRIP.AUTO: 5.5 [PH] (ref 5–8)
PLATELET # BLD AUTO: 122 10*3/MM3 (ref 130–400)
PMV BLD AUTO: 10.9 FL (ref 6–10)
POTASSIUM BLD-SCNC: 4.5 MMOL/L (ref 3.5–5.3)
PROPOXYPH UR QL: NEGATIVE
PROT SERPL-MCNC: 7.6 G/DL (ref 6–8)
PROT UR QL STRIP: ABNORMAL
RBC # BLD AUTO: 4.35 10*6/MM3 (ref 4.2–5.4)
RBC # UR: ABNORMAL /HPF
REF LAB TEST METHOD: ABNORMAL
SODIUM BLD-SCNC: 137 MMOL/L (ref 135–153)
SP GR UR STRIP: 1.02 (ref 1–1.03)
SQUAMOUS #/AREA URNS HPF: ABNORMAL /HPF
UROBILINOGEN UR QL STRIP: ABNORMAL
WBC NRBC COR # BLD: 5.11 10*3/MM3 (ref 4.5–12.5)
WBC UR QL AUTO: ABNORMAL /HPF

## 2017-02-18 PROCEDURE — 80307 DRUG TEST PRSMV CHEM ANLYZR: CPT | Performed by: EMERGENCY MEDICINE

## 2017-02-18 PROCEDURE — 74176 CT ABD & PELVIS W/O CONTRAST: CPT

## 2017-02-18 PROCEDURE — 96361 HYDRATE IV INFUSION ADD-ON: CPT

## 2017-02-18 PROCEDURE — 25010000002 ONDANSETRON PER 1 MG: Performed by: EMERGENCY MEDICINE

## 2017-02-18 PROCEDURE — 81001 URINALYSIS AUTO W/SCOPE: CPT | Performed by: EMERGENCY MEDICINE

## 2017-02-18 PROCEDURE — 87086 URINE CULTURE/COLONY COUNT: CPT | Performed by: EMERGENCY MEDICINE

## 2017-02-18 PROCEDURE — 80053 COMPREHEN METABOLIC PANEL: CPT | Performed by: EMERGENCY MEDICINE

## 2017-02-18 PROCEDURE — 85025 COMPLETE CBC W/AUTO DIFF WBC: CPT | Performed by: EMERGENCY MEDICINE

## 2017-02-18 PROCEDURE — 74176 CT ABD & PELVIS W/O CONTRAST: CPT | Performed by: RADIOLOGY

## 2017-02-18 PROCEDURE — 96374 THER/PROPH/DIAG INJ IV PUSH: CPT

## 2017-02-18 PROCEDURE — 96375 TX/PRO/DX INJ NEW DRUG ADDON: CPT

## 2017-02-18 PROCEDURE — 99283 EMERGENCY DEPT VISIT LOW MDM: CPT

## 2017-02-18 PROCEDURE — 36415 COLL VENOUS BLD VENIPUNCTURE: CPT

## 2017-02-18 PROCEDURE — 25010000002 BUTORPHANOL PER 1 MG: Performed by: EMERGENCY MEDICINE

## 2017-02-18 RX ORDER — ONDANSETRON 4 MG/1
4 TABLET, ORALLY DISINTEGRATING ORAL EVERY 6 HOURS PRN
Qty: 15 TABLET | Refills: 0 | Status: SHIPPED | OUTPATIENT
Start: 2017-02-18 | End: 2017-04-27 | Stop reason: SDUPTHER

## 2017-02-18 RX ORDER — SODIUM CHLORIDE 0.9 % (FLUSH) 0.9 %
10 SYRINGE (ML) INJECTION AS NEEDED
Status: DISCONTINUED | OUTPATIENT
Start: 2017-02-18 | End: 2017-02-18 | Stop reason: HOSPADM

## 2017-02-18 RX ORDER — ONDANSETRON 2 MG/ML
4 INJECTION INTRAMUSCULAR; INTRAVENOUS ONCE
Status: COMPLETED | OUTPATIENT
Start: 2017-02-18 | End: 2017-02-18

## 2017-02-18 RX ORDER — AMOXICILLIN AND CLAVULANATE POTASSIUM 875; 125 MG/1; MG/1
1 TABLET, FILM COATED ORAL 2 TIMES DAILY
Qty: 20 TABLET | Refills: 0 | Status: SHIPPED | OUTPATIENT
Start: 2017-02-18 | End: 2017-04-27

## 2017-02-18 RX ADMIN — ONDANSETRON 4 MG: 2 INJECTION INTRAMUSCULAR; INTRAVENOUS at 10:23

## 2017-02-18 RX ADMIN — BUTORPHANOL TARTRATE 2 MG: 2 INJECTION, SOLUTION INTRAMUSCULAR; INTRAVENOUS at 10:19

## 2017-02-18 RX ADMIN — SODIUM CHLORIDE 1000 ML: 9 INJECTION, SOLUTION INTRAVENOUS at 10:22

## 2017-02-18 NOTE — ED PROVIDER NOTES
Subjective   Patient is a 42 y.o. female presenting with abdominal pain.   History provided by:  Patient   used: No    Abdominal Pain   Pain location:  L flank  Pain quality: cramping    Pain radiates to:  Does not radiate  Pain severity:  Moderate  Onset quality:  Gradual  Duration:  3 days  Timing:  Constant  Progression:  Worsening  Chronicity:  Recurrent  Context: not alcohol use, not awakening from sleep, not diet changes, not eating, not laxative use, not medication withdrawal, not previous surgeries, not recent illness, not recent sexual activity, not recent travel, not retching, not sick contacts, not suspicious food intake and not trauma    Relieved by:  Nothing  Worsened by:  Nothing  Ineffective treatments:  None tried  Associated symptoms: dysuria, nausea and vomiting    Associated symptoms: no anorexia, no belching, no chest pain, no chills, no constipation, no cough, no diarrhea, no fatigue, no fever, no flatus, no hematemesis, no hematochezia, no hematuria, no melena, no shortness of breath, no sore throat, no vaginal bleeding and no vaginal discharge    Risk factors: no alcohol abuse, no aspirin use, not elderly, has not had multiple surgeries, no NSAID use, not obese, not pregnant and no recent hospitalization        Review of Systems   Constitutional: Negative for chills, fatigue and fever.   HENT: Negative for sore throat.    Respiratory: Negative for cough and shortness of breath.    Cardiovascular: Negative for chest pain.   Gastrointestinal: Positive for abdominal pain, nausea and vomiting. Negative for anorexia, constipation, diarrhea, flatus, hematemesis, hematochezia and melena.   Genitourinary: Positive for dysuria. Negative for hematuria, vaginal bleeding and vaginal discharge.   All other systems reviewed and are negative.      Past Medical History   Diagnosis Date   • Abdominal pain    • Abdominal swelling    • Bipolar 1 disorder    • Brain tumor      R Frontal Lobe  per pt   • Constipation    • Diarrhea    • Fibromyalgia    • IBS (irritable bowel syndrome)    • Migraine    • Nausea & vomiting    • PONV (postoperative nausea and vomiting)    • PTSD (post-traumatic stress disorder)    • Rectal bleeding        Allergies   Allergen Reactions   • Ativan [Lorazepam] Hallucinations     confusion   • Sulfa Antibiotics Shortness Of Breath and Swelling   • Compazine [Prochlorperazine Edisylate] Hives   • Demerol [Meperidine] Hives   • Droperidol    • Prochlorperazine    • Toradol [Ketorolac Tromethamine] Hives and Itching       Past Surgical History   Procedure Laterality Date   • Hysterectomy     • Appendectomy     • Shoulder surgery       3 times   • Knee surgery     • Endoscopy N/A 6/30/2016     Procedure: ESOPHAGOGASTRODUODENOSCOPY WITH BIOPSY  CPTCODE:30177;  Surgeon: Jose Antonio Belle III, MD;  Location: [facility] OR;  Service:    • Colonoscopy N/A 6/30/2016     Procedure: COLONOSCOPY  CPTCODE:96188;  Surgeon: Jose Antonio Belle III, MD;  Location: [facility] OR;  Service:    • Colonoscopy N/A 7/7/2016     Procedure: COLONOSCOPY (15011) CPT;  Surgeon: Jose Antonio Belle III, MD;  Location: [facility] OR;  Service:    • Hemorrhoidectomy N/A 7/28/2016     Procedure: HEMORRHOID STAPLING;  Surgeon: Kael Lopez MD;  Location: [facility] OR;  Service:    • Anal scope N/A 7/28/2016     Procedure: ANAL SCOPE;  Surgeon: Kael Lopez MD;  Location: [facility] OR;  Service:        Family History   Problem Relation Age of Onset   • Crohn's disease Other    • Hypertension Other    • Diabetes Other    • Irritable bowel syndrome Other        Social History     Social History   • Marital status:      Spouse name: N/A   • Number of children: N/A   • Years of education: N/A     Social History Main Topics   • Smoking status: Former Smoker     Types: Cigarettes     Quit date: 11/1/2015   • Smokeless tobacco: Never Used   • Alcohol use No   • Drug use: Yes     Special: Marijuana   • Sexual activity:  Defer     Other Topics Concern   • None     Social History Narrative           Objective   Physical Exam   Constitutional: She is oriented to person, place, and time. Vital signs are normal. She appears well-developed and well-nourished.   HENT:   Head: Normocephalic and atraumatic.   Left Ear: External ear normal.   Nose: Nose normal.   Mouth/Throat: Oropharynx is clear and moist.   Eyes: Conjunctivae and EOM are normal. Pupils are equal, round, and reactive to light.   Neck: Normal range of motion.   Cardiovascular: Normal rate and regular rhythm.    Pulmonary/Chest: Effort normal and breath sounds normal.   Abdominal: Soft. Normal appearance and bowel sounds are normal. There is no tenderness. There is CVA tenderness (left).   Musculoskeletal: Normal range of motion.   Neurological: She is alert and oriented to person, place, and time.   Skin: Skin is warm.   Nursing note and vitals reviewed.      Procedures         ED Course  ED Course                  MDM  Number of Diagnoses or Management Options  Acute UTI (urinary tract infection): new and requires workup     Amount and/or Complexity of Data Reviewed  Clinical lab tests: reviewed and ordered  Tests in the radiology section of CPT®: reviewed and ordered  Tests in the medicine section of CPT®: ordered and reviewed    Risk of Complications, Morbidity, and/or Mortality  Presenting problems: moderate  Diagnostic procedures: moderate  Management options: moderate    Patient Progress  Patient progress: stable      Final diagnoses:   Acute UTI (urinary tract infection)            TAMI Arrington  02/18/17 1538

## 2017-02-18 NOTE — ED NOTES
Pt in bathroom when called for triage. Urine cup was provided by triage tech prior to her going to the bathroom.     Tayler Morales RN  02/18/17 [id number]

## 2017-02-19 LAB
ALBUMIN SERPL-MCNC: 4.5 G/DL (ref 3.5–5)
ALBUMIN/GLOB SERPL: 1.3 G/DL (ref 1.5–2.5)
ALP SERPL-CCNC: 77 U/L (ref 35–104)
ALT SERPL W P-5'-P-CCNC: 23 U/L (ref 10–36)
ANION GAP SERPL CALCULATED.3IONS-SCNC: 6.7 MMOL/L (ref 3.6–11.2)
AST SERPL-CCNC: 66 U/L (ref 10–30)
BACTERIA UR QL AUTO: ABNORMAL /HPF
BASOPHILS # BLD AUTO: 0 10*3/MM3 (ref 0–0.3)
BASOPHILS NFR BLD AUTO: 0 % (ref 0–2)
BILIRUB SERPL-MCNC: 0.6 MG/DL (ref 0.2–1.8)
BILIRUB UR QL STRIP: NEGATIVE
BUN BLD-MCNC: 10 MG/DL (ref 7–21)
BUN/CREAT SERPL: 13.2 (ref 7–25)
CALCIUM SPEC-SCNC: 10.1 MG/DL (ref 7.7–10)
CHLORIDE SERPL-SCNC: 105 MMOL/L (ref 99–112)
CLARITY UR: ABNORMAL
CO2 SERPL-SCNC: 31.3 MMOL/L (ref 24.3–31.9)
COLOR UR: ABNORMAL
CREAT BLD-MCNC: 0.76 MG/DL (ref 0.43–1.29)
DEPRECATED RDW RBC AUTO: 44.5 FL (ref 37–54)
EOSINOPHIL # BLD AUTO: 0.11 10*3/MM3 (ref 0–0.7)
EOSINOPHIL NFR BLD AUTO: 1.4 % (ref 0–5)
ERYTHROCYTE [DISTWIDTH] IN BLOOD BY AUTOMATED COUNT: 13.1 % (ref 11.5–14.5)
GFR SERPL CREATININE-BSD FRML MDRD: 83 ML/MIN/1.73
GLOBULIN UR ELPH-MCNC: 3.5 GM/DL
GLUCOSE BLD-MCNC: 94 MG/DL (ref 70–110)
GLUCOSE UR STRIP-MCNC: NEGATIVE MG/DL
HCT VFR BLD AUTO: 43.9 % (ref 37–47)
HGB BLD-MCNC: 14.6 G/DL (ref 12–16)
HGB UR QL STRIP.AUTO: ABNORMAL
HYALINE CASTS UR QL AUTO: ABNORMAL /LPF
IMM GRANULOCYTES # BLD: 0 10*3/MM3 (ref 0–0.03)
IMM GRANULOCYTES NFR BLD: 0 % (ref 0–0.5)
KETONES UR QL STRIP: NEGATIVE
LEUKOCYTE ESTERASE UR QL STRIP.AUTO: ABNORMAL
LIPASE SERPL-CCNC: 42 U/L (ref 13–60)
LYMPHOCYTES # BLD AUTO: 3.35 10*3/MM3 (ref 1–3)
LYMPHOCYTES NFR BLD AUTO: 41.9 % (ref 21–51)
MCH RBC QN AUTO: 32 PG (ref 27–33)
MCHC RBC AUTO-ENTMCNC: 33.3 G/DL (ref 33–37)
MCV RBC AUTO: 96.3 FL (ref 80–94)
MONOCYTES # BLD AUTO: 0.53 10*3/MM3 (ref 0.1–0.9)
MONOCYTES NFR BLD AUTO: 6.6 % (ref 0–10)
NEUTROPHILS # BLD AUTO: 4 10*3/MM3 (ref 1.4–6.5)
NEUTROPHILS NFR BLD AUTO: 50.1 % (ref 30–70)
NITRITE UR QL STRIP: NEGATIVE
OSMOLALITY SERPL CALC.SUM OF ELEC: 283.8 MOSM/KG (ref 273–305)
PH UR STRIP.AUTO: 5.5 [PH] (ref 5–8)
PLATELET # BLD AUTO: 144 10*3/MM3 (ref 130–400)
PMV BLD AUTO: 10.8 FL (ref 6–10)
POTASSIUM BLD-SCNC: 4.2 MMOL/L (ref 3.5–5.3)
PROT SERPL-MCNC: 8 G/DL (ref 6–8)
PROT UR QL STRIP: ABNORMAL
RBC # BLD AUTO: 4.56 10*6/MM3 (ref 4.2–5.4)
RBC # UR: ABNORMAL /HPF
REF LAB TEST METHOD: ABNORMAL
SODIUM BLD-SCNC: 143 MMOL/L (ref 135–153)
SP GR UR STRIP: 1.02 (ref 1–1.03)
SQUAMOUS #/AREA URNS HPF: ABNORMAL /HPF
UROBILINOGEN UR QL STRIP: ABNORMAL
WBC NRBC COR # BLD: 7.99 10*3/MM3 (ref 4.5–12.5)
WBC UR QL AUTO: ABNORMAL /HPF

## 2017-02-19 PROCEDURE — 83690 ASSAY OF LIPASE: CPT | Performed by: EMERGENCY MEDICINE

## 2017-02-19 PROCEDURE — 80053 COMPREHEN METABOLIC PANEL: CPT | Performed by: EMERGENCY MEDICINE

## 2017-02-19 PROCEDURE — 87086 URINE CULTURE/COLONY COUNT: CPT | Performed by: EMERGENCY MEDICINE

## 2017-02-19 PROCEDURE — 99283 EMERGENCY DEPT VISIT LOW MDM: CPT

## 2017-02-19 PROCEDURE — 81001 URINALYSIS AUTO W/SCOPE: CPT | Performed by: EMERGENCY MEDICINE

## 2017-02-19 PROCEDURE — 85025 COMPLETE CBC W/AUTO DIFF WBC: CPT | Performed by: EMERGENCY MEDICINE

## 2017-02-19 PROCEDURE — 36415 COLL VENOUS BLD VENIPUNCTURE: CPT

## 2017-02-20 ENCOUNTER — HOSPITAL ENCOUNTER (EMERGENCY)
Facility: HOSPITAL | Age: 42
Discharge: HOME | End: 2017-02-20
Attending: EMERGENCY MEDICINE | Admitting: EMERGENCY MEDICINE

## 2017-02-20 VITALS
SYSTOLIC BLOOD PRESSURE: 120 MMHG | HEART RATE: 80 BPM | OXYGEN SATURATION: 98 % | TEMPERATURE: 97.2 F | BODY MASS INDEX: 22.66 KG/M2 | WEIGHT: 153 LBS | HEIGHT: 69 IN | RESPIRATION RATE: 18 BRPM | DIASTOLIC BLOOD PRESSURE: 74 MMHG

## 2017-02-20 DIAGNOSIS — R31.0 GROSS HEMATURIA: Primary | ICD-10-CM

## 2017-02-20 LAB
BACTERIA SPEC AEROBE CULT: NORMAL
HOLD SPECIMEN: NORMAL
HOLD SPECIMEN: NORMAL
WHOLE BLOOD HOLD SPECIMEN: NORMAL
WHOLE BLOOD HOLD SPECIMEN: NORMAL

## 2017-02-20 PROCEDURE — 25010000002 HYDROMORPHONE PER 4 MG: Performed by: EMERGENCY MEDICINE

## 2017-02-20 PROCEDURE — 25010000002 PROMETHAZINE PER 50 MG: Performed by: EMERGENCY MEDICINE

## 2017-02-20 PROCEDURE — 96372 THER/PROPH/DIAG INJ SC/IM: CPT

## 2017-02-20 RX ORDER — PROMETHAZINE HYDROCHLORIDE 25 MG/ML
12.5 INJECTION, SOLUTION INTRAMUSCULAR; INTRAVENOUS ONCE
Status: COMPLETED | OUTPATIENT
Start: 2017-02-20 | End: 2017-02-20

## 2017-02-20 RX ORDER — HYDROMORPHONE HCL 110MG/55ML
2 PATIENT CONTROLLED ANALGESIA SYRINGE INTRAVENOUS ONCE
Status: COMPLETED | OUTPATIENT
Start: 2017-02-20 | End: 2017-02-20

## 2017-02-20 RX ADMIN — PROMETHAZINE HYDROCHLORIDE 12.5 MG: 25 INJECTION, SOLUTION INTRAMUSCULAR; INTRAVENOUS at 00:50

## 2017-02-20 RX ADMIN — HYDROMORPHONE HYDROCHLORIDE 2 MG: 2 INJECTION, SOLUTION INTRAMUSCULAR; INTRAVENOUS; SUBCUTANEOUS at 00:49

## 2017-02-20 NOTE — ED PROVIDER NOTES
Subjective   Patient is a 42 y.o. female presenting with hematuria.   History provided by:  Patient  Blood in Urine   This is a recurrent problem. She reports no clotting in her urine stream. Her pain is at a severity of 7/10. The pain is moderate. She describes her urine color as light pink. Irritative symptoms include frequency and urgency. Associated symptoms include abdominal pain and chills. Pertinent negatives include no dysuria or fever. Her past medical history is significant for kidney stones.       Review of Systems   Constitutional: Positive for chills. Negative for fever.   HENT: Negative.    Respiratory: Negative.    Cardiovascular: Negative.  Negative for chest pain.   Gastrointestinal: Positive for abdominal pain.   Endocrine: Negative.    Genitourinary: Positive for frequency, hematuria and urgency. Negative for dysuria.   Skin: Negative.    Neurological: Negative.    Psychiatric/Behavioral: Negative.    All other systems reviewed and are negative.      Past Medical History   Diagnosis Date   • Abdominal pain    • Abdominal swelling    • Bipolar 1 disorder    • Brain tumor      R Frontal Lobe per pt   • Constipation    • Diarrhea    • Fibromyalgia    • IBS (irritable bowel syndrome)    • Migraine    • Nausea & vomiting    • PONV (postoperative nausea and vomiting)    • PTSD (post-traumatic stress disorder)    • Rectal bleeding        Allergies   Allergen Reactions   • Ativan [Lorazepam] Hallucinations     confusion   • Sulfa Antibiotics Shortness Of Breath and Swelling   • Compazine [Prochlorperazine Edisylate] Hives   • Demerol [Meperidine] Hives   • Droperidol    • Prochlorperazine    • Toradol [Ketorolac Tromethamine] Hives and Itching       Past Surgical History   Procedure Laterality Date   • Hysterectomy     • Appendectomy     • Shoulder surgery       3 times   • Knee surgery     • Endoscopy N/A 6/30/2016     Procedure: ESOPHAGOGASTRODUODENOSCOPY WITH BIOPSY  CPTCODE:49069;  Surgeon: Jose Antonio  Reynold Belle III, MD;  Location: [facility] OR;  Service:    • Colonoscopy N/A 6/30/2016     Procedure: COLONOSCOPY  CPTCODE:86765;  Surgeon: Jose Antonio Belle III, MD;  Location:  [facility] OR;  Service:    • Colonoscopy N/A 7/7/2016     Procedure: COLONOSCOPY (39631) CPT;  Surgeon: Jose Antonio Belle III, MD;  Location:  [facility] OR;  Service:    • Hemorrhoidectomy N/A 7/28/2016     Procedure: HEMORRHOID STAPLING;  Surgeon: Kael Lopez MD;  Location: [facility] OR;  Service:    • Anal scope N/A 7/28/2016     Procedure: ANAL SCOPE;  Surgeon: Kael Lopez MD;  Location: [facility] OR;  Service:        Family History   Problem Relation Age of Onset   • Crohn's disease Other    • Hypertension Other    • Diabetes Other    • Irritable bowel syndrome Other        Social History     Social History   • Marital status:      Spouse name: N/A   • Number of children: N/A   • Years of education: N/A     Social History Main Topics   • Smoking status: Former Smoker     Types: Cigarettes     Quit date: 11/1/2015   • Smokeless tobacco: Never Used   • Alcohol use No   • Drug use: Yes     Special: Marijuana   • Sexual activity: Defer     Other Topics Concern   • Not on file     Social History Narrative           Objective   Physical Exam   Constitutional: She is oriented to person, place, and time. She appears well-developed and well-nourished. No distress.   HENT:   Head: Normocephalic and atraumatic.   Right Ear: External ear normal.   Left Ear: External ear normal.   Nose: Nose normal.   Eyes: Conjunctivae and EOM are normal. Pupils are equal, round, and reactive to light.   Neck: Normal range of motion. Neck supple. No JVD present. No tracheal deviation present.   Cardiovascular: Normal rate, regular rhythm and normal heart sounds.    No murmur heard.  Pulmonary/Chest: Effort normal and breath sounds normal. No respiratory distress. She has no wheezes.   Abdominal: Soft. Bowel sounds are normal. There is no tenderness.    Musculoskeletal: Normal range of motion. She exhibits no edema or deformity.   Neurological: She is alert and oriented to person, place, and time. No cranial nerve deficit.   Skin: Skin is warm and dry. No rash noted. She is not diaphoretic. No erythema. No pallor.   Psychiatric: She has a normal mood and affect. Her behavior is normal. Thought content normal.   Nursing note and vitals reviewed.      Procedures         ED Course  ED Course                  MDM    Final diagnoses:   Gross hematuria            Mitesh Garza MD  02/20/17 0031       Mitesh Garza MD  02/20/17 0131

## 2017-02-21 ENCOUNTER — HOSPITAL ENCOUNTER (EMERGENCY)
Facility: HOSPITAL | Age: 42
Discharge: HOME | End: 2017-02-22
Attending: EMERGENCY MEDICINE | Admitting: EMERGENCY MEDICINE

## 2017-02-21 ENCOUNTER — APPOINTMENT (OUTPATIENT)
Dept: CT IMAGING | Facility: HOSPITAL | Age: 42
End: 2017-02-21

## 2017-02-21 DIAGNOSIS — R31.0 GROSS HEMATURIA: Primary | ICD-10-CM

## 2017-02-21 DIAGNOSIS — N30.01 ACUTE CYSTITIS WITH HEMATURIA: ICD-10-CM

## 2017-02-21 LAB
ALBUMIN SERPL-MCNC: 4.8 G/DL (ref 3.5–5)
ALBUMIN/GLOB SERPL: 1.5 G/DL (ref 1.5–2.5)
ALP SERPL-CCNC: 83 U/L (ref 35–104)
ALT SERPL W P-5'-P-CCNC: 85 U/L (ref 10–36)
AMPHET+METHAMPHET UR QL: NEGATIVE
ANION GAP SERPL CALCULATED.3IONS-SCNC: 7.9 MMOL/L (ref 3.6–11.2)
AST SERPL-CCNC: 69 U/L (ref 10–30)
BACTERIA UR QL AUTO: ABNORMAL /HPF
BARBITURATES UR QL SCN: NEGATIVE
BASOPHILS # BLD AUTO: 0.01 10*3/MM3 (ref 0–0.3)
BASOPHILS NFR BLD AUTO: 0.1 % (ref 0–2)
BENZODIAZ UR QL SCN: NEGATIVE
BILIRUB SERPL-MCNC: 0.9 MG/DL (ref 0.2–1.8)
BILIRUB UR QL STRIP: ABNORMAL
BUN BLD-MCNC: 11 MG/DL (ref 7–21)
BUN/CREAT SERPL: 16.4 (ref 7–25)
CALCIUM SPEC-SCNC: 9.7 MG/DL (ref 7.7–10)
CANNABINOIDS SERPL QL: POSITIVE
CHLORIDE SERPL-SCNC: 104 MMOL/L (ref 99–112)
CLARITY UR: ABNORMAL
CO2 SERPL-SCNC: 28.1 MMOL/L (ref 24.3–31.9)
COCAINE UR QL: NEGATIVE
COLOR UR: ABNORMAL
CREAT BLD-MCNC: 0.67 MG/DL (ref 0.43–1.29)
DEPRECATED RDW RBC AUTO: 43.8 FL (ref 37–54)
EOSINOPHIL # BLD AUTO: 0.08 10*3/MM3 (ref 0–0.7)
EOSINOPHIL NFR BLD AUTO: 0.9 % (ref 0–5)
ERYTHROCYTE [DISTWIDTH] IN BLOOD BY AUTOMATED COUNT: 13.2 % (ref 11.5–14.5)
GFR SERPL CREATININE-BSD FRML MDRD: 97 ML/MIN/1.73
GLOBULIN UR ELPH-MCNC: 3.3 GM/DL
GLUCOSE BLD-MCNC: 95 MG/DL (ref 70–110)
GLUCOSE UR STRIP-MCNC: NEGATIVE MG/DL
HCT VFR BLD AUTO: 46 % (ref 37–47)
HGB BLD-MCNC: 15.3 G/DL (ref 12–16)
HGB UR QL STRIP.AUTO: ABNORMAL
HYALINE CASTS UR QL AUTO: ABNORMAL /LPF
IMM GRANULOCYTES # BLD: 0.01 10*3/MM3 (ref 0–0.03)
IMM GRANULOCYTES NFR BLD: 0.1 % (ref 0–0.5)
KETONES UR QL STRIP: NEGATIVE
LEUKOCYTE ESTERASE UR QL STRIP.AUTO: ABNORMAL
LYMPHOCYTES # BLD AUTO: 3.39 10*3/MM3 (ref 1–3)
LYMPHOCYTES NFR BLD AUTO: 37.7 % (ref 21–51)
MCH RBC QN AUTO: 31.4 PG (ref 27–33)
MCHC RBC AUTO-ENTMCNC: 33.3 G/DL (ref 33–37)
MCV RBC AUTO: 94.3 FL (ref 80–94)
METHADONE UR QL SCN: NEGATIVE
MONOCYTES # BLD AUTO: 0.5 10*3/MM3 (ref 0.1–0.9)
MONOCYTES NFR BLD AUTO: 5.6 % (ref 0–10)
NEUTROPHILS # BLD AUTO: 5.01 10*3/MM3 (ref 1.4–6.5)
NEUTROPHILS NFR BLD AUTO: 55.6 % (ref 30–70)
NITRITE UR QL STRIP: POSITIVE
OPIATES UR QL: POSITIVE
OSMOLALITY SERPL CALC.SUM OF ELEC: 278.6 MOSM/KG (ref 273–305)
OXYCODONE UR QL SCN: NEGATIVE
PCP UR QL SCN: NEGATIVE
PH UR STRIP.AUTO: 6 [PH] (ref 5–8)
PLATELET # BLD AUTO: 149 10*3/MM3 (ref 130–400)
PMV BLD AUTO: 10.6 FL (ref 6–10)
POTASSIUM BLD-SCNC: 3.6 MMOL/L (ref 3.5–5.3)
PROPOXYPH UR QL: NEGATIVE
PROT SERPL-MCNC: 8.1 G/DL (ref 6–8)
PROT UR QL STRIP: ABNORMAL
RBC # BLD AUTO: 4.88 10*6/MM3 (ref 4.2–5.4)
RBC # UR: ABNORMAL /HPF
REF LAB TEST METHOD: ABNORMAL
SODIUM BLD-SCNC: 140 MMOL/L (ref 135–153)
SP GR UR STRIP: 1.02 (ref 1–1.03)
SQUAMOUS #/AREA URNS HPF: ABNORMAL /HPF
UROBILINOGEN UR QL STRIP: ABNORMAL
VALPROATE SERPL-MCNC: <1 MCG/ML (ref 50–100)
WBC NRBC COR # BLD: 9 10*3/MM3 (ref 4.5–12.5)
WBC UR QL AUTO: ABNORMAL /HPF

## 2017-02-21 PROCEDURE — 87086 URINE CULTURE/COLONY COUNT: CPT | Performed by: EMERGENCY MEDICINE

## 2017-02-21 PROCEDURE — 80307 DRUG TEST PRSMV CHEM ANLYZR: CPT | Performed by: EMERGENCY MEDICINE

## 2017-02-21 PROCEDURE — 96375 TX/PRO/DX INJ NEW DRUG ADDON: CPT

## 2017-02-21 PROCEDURE — 25010000002 PROMETHAZINE PER 50 MG: Performed by: EMERGENCY MEDICINE

## 2017-02-21 PROCEDURE — 96365 THER/PROPH/DIAG IV INF INIT: CPT

## 2017-02-21 PROCEDURE — 80164 ASSAY DIPROPYLACETIC ACD TOT: CPT | Performed by: EMERGENCY MEDICINE

## 2017-02-21 PROCEDURE — 85025 COMPLETE CBC W/AUTO DIFF WBC: CPT | Performed by: EMERGENCY MEDICINE

## 2017-02-21 PROCEDURE — 25010000002 PIPERACILLIN-TAZOBACTAM: Performed by: EMERGENCY MEDICINE

## 2017-02-21 PROCEDURE — 99284 EMERGENCY DEPT VISIT MOD MDM: CPT

## 2017-02-21 PROCEDURE — 74177 CT ABD & PELVIS W/CONTRAST: CPT

## 2017-02-21 PROCEDURE — 74177 CT ABD & PELVIS W/CONTRAST: CPT | Performed by: RADIOLOGY

## 2017-02-21 PROCEDURE — 25010000002 BUTORPHANOL PER 1 MG: Performed by: EMERGENCY MEDICINE

## 2017-02-21 PROCEDURE — 81001 URINALYSIS AUTO W/SCOPE: CPT | Performed by: EMERGENCY MEDICINE

## 2017-02-21 PROCEDURE — 0 IOVERSOL 68 % SOLUTION: Performed by: EMERGENCY MEDICINE

## 2017-02-21 PROCEDURE — 80053 COMPREHEN METABOLIC PANEL: CPT | Performed by: EMERGENCY MEDICINE

## 2017-02-21 PROCEDURE — 96367 TX/PROPH/DG ADDL SEQ IV INF: CPT

## 2017-02-21 RX ORDER — ONDANSETRON 2 MG/ML
4 INJECTION INTRAMUSCULAR; INTRAVENOUS ONCE
Status: COMPLETED | OUTPATIENT
Start: 2017-02-21 | End: 2017-02-22

## 2017-02-21 RX ADMIN — PROMETHAZINE HYDROCHLORIDE 12.5 MG: 25 INJECTION INTRAMUSCULAR; INTRAVENOUS at 23:02

## 2017-02-21 RX ADMIN — TAZOBACTAM SODIUM AND PIPERACILLIN SODIUM 4.5 G: .5; 4 INJECTION, POWDER, LYOPHILIZED, FOR SOLUTION INTRAVENOUS at 23:43

## 2017-02-21 RX ADMIN — SODIUM CHLORIDE 500 ML: 9 INJECTION, SOLUTION INTRAVENOUS at 23:02

## 2017-02-21 RX ADMIN — BUTORPHANOL TARTRATE 1 MG: 2 INJECTION, SOLUTION INTRAMUSCULAR; INTRAVENOUS at 23:02

## 2017-02-21 RX ADMIN — IOVERSOL 100 ML: 678 INJECTION INTRA-ARTERIAL; INTRAVENOUS at 23:13

## 2017-02-22 VITALS
BODY MASS INDEX: 22.66 KG/M2 | OXYGEN SATURATION: 98 % | HEART RATE: 100 BPM | WEIGHT: 153 LBS | SYSTOLIC BLOOD PRESSURE: 123 MMHG | HEIGHT: 69 IN | DIASTOLIC BLOOD PRESSURE: 81 MMHG | TEMPERATURE: 98.1 F | RESPIRATION RATE: 18 BRPM

## 2017-02-22 LAB — BACTERIA SPEC AEROBE CULT: NORMAL

## 2017-02-22 PROCEDURE — 96376 TX/PRO/DX INJ SAME DRUG ADON: CPT

## 2017-02-22 PROCEDURE — 96375 TX/PRO/DX INJ NEW DRUG ADDON: CPT

## 2017-02-22 PROCEDURE — 25010000002 ONDANSETRON PER 1 MG: Performed by: EMERGENCY MEDICINE

## 2017-02-22 PROCEDURE — 25010000002 BUTORPHANOL PER 1 MG: Performed by: EMERGENCY MEDICINE

## 2017-02-22 RX ADMIN — BUTORPHANOL TARTRATE 1 MG: 2 INJECTION, SOLUTION INTRAMUSCULAR; INTRAVENOUS at 00:12

## 2017-02-22 RX ADMIN — ONDANSETRON 4 MG: 2 INJECTION, SOLUTION INTRAMUSCULAR; INTRAVENOUS at 00:12

## 2017-02-22 NOTE — ED NOTES
Sitting with family in lobby, no acute distress noted, will continue to monitor.      Fe Peraels RN  02/21/17 8315

## 2017-02-22 NOTE — ED PROVIDER NOTES
Subjective   HPI Comments: Pt comes in with c/o continued hematuria, right flank pain, nausea and vomiting.  Seen here yesterday, twice, for the same.  Has appointment to see her Urologist tomorrow.  C/O uncontrolled pain and N/V.  Numerous frequent visits for same.   very resistant to CT.  Worried about radiation exposure.  I advised both of them that they can absolutely refuse CT if they are concerned.    Patient is a 42 y.o. female presenting with hematuria.   History provided by:  Patient, spouse and medical records  Blood in Urine   This is a recurrent problem. The current episode started in the past 7 days. The problem is unchanged. She describes the hematuria as gross hematuria. The hematuria occurs throughout her entire urinary stream. She reports no clotting in her urine stream. Her pain is at a severity of 10/10. The pain is severe. She describes her urine color as dark red. Irritative symptoms include frequency. Obstructive symptoms include dribbling and straining. Associated symptoms include abdominal pain, chills, dysuria, flank pain, hesitancy, nausea and vomiting. Pertinent negatives include no facial swelling, fever, genital pain or inability to urinate. Her past medical history is significant for kidney stones. There is no history of BPH,  trauma, hypertension, prostatitis, sickle cell disease or STDs.       Review of Systems   Constitutional: Positive for chills. Negative for fever.   HENT: Negative.  Negative for facial swelling.    Eyes: Negative.    Respiratory: Negative.    Cardiovascular: Negative.    Gastrointestinal: Positive for abdominal pain, nausea and vomiting.   Endocrine: Negative.    Genitourinary: Positive for decreased urine volume, difficulty urinating, dysuria, flank pain, frequency, hematuria and hesitancy.   Skin: Negative.    Allergic/Immunologic: Negative.    Neurological: Negative.    Hematological: Negative.    Psychiatric/Behavioral: Negative.    All other systems  reviewed and are negative.      Past Medical History   Diagnosis Date   • Abdominal pain    • Abdominal swelling    • Bipolar 1 disorder    • Brain tumor      R Frontal Lobe per pt   • Constipation    • Diarrhea    • Fibromyalgia    • IBS (irritable bowel syndrome)    • Migraine    • Nausea & vomiting    • PONV (postoperative nausea and vomiting)    • PTSD (post-traumatic stress disorder)    • Rectal bleeding        Allergies   Allergen Reactions   • Ativan [Lorazepam] Hallucinations     confusion   • Sulfa Antibiotics Shortness Of Breath and Swelling   • Compazine [Prochlorperazine Edisylate] Hives   • Demerol [Meperidine] Hives   • Droperidol    • Prochlorperazine    • Toradol [Ketorolac Tromethamine] Hives and Itching       Past Surgical History   Procedure Laterality Date   • Hysterectomy     • Appendectomy     • Shoulder surgery       3 times   • Knee surgery     • Endoscopy N/A 6/30/2016     Procedure: ESOPHAGOGASTRODUODENOSCOPY WITH BIOPSY  CPTCODE:95425;  Surgeon: Jose Antonio Belle III, MD;  Location: [facility] OR;  Service:    • Colonoscopy N/A 6/30/2016     Procedure: COLONOSCOPY  CPTCODE:49918;  Surgeon: Jose Antonio Belle III, MD;  Location: [facility] OR;  Service:    • Colonoscopy N/A 7/7/2016     Procedure: COLONOSCOPY (28791) CPT;  Surgeon: Jose Antonio Belle III, MD;  Location: [facility] OR;  Service:    • Hemorrhoidectomy N/A 7/28/2016     Procedure: HEMORRHOID STAPLING;  Surgeon: Kael Lopez MD;  Location: [facility] OR;  Service:    • Anal scope N/A 7/28/2016     Procedure: ANAL SCOPE;  Surgeon: Kael Lopez MD;  Location: [facility] OR;  Service:        Family History   Problem Relation Age of Onset   • Crohn's disease Other    • Hypertension Other    • Diabetes Other    • Irritable bowel syndrome Other        Social History     Social History   • Marital status:      Spouse name: N/A   • Number of children: N/A   • Years of education: N/A     Social History Main Topics   • Smoking status:  Former Smoker     Types: Cigarettes     Quit date: 11/1/2015   • Smokeless tobacco: Never Used   • Alcohol use No   • Drug use: Yes     Special: Marijuana   • Sexual activity: Defer     Other Topics Concern   • None     Social History Narrative           Objective   Physical Exam   Constitutional: She is oriented to person, place, and time. She appears well-developed and well-nourished. No distress.   HENT:   Head: Normocephalic and atraumatic.   Nose: Nose normal.   Moist mucus membranes   Eyes: Conjunctivae and EOM are normal. Right eye exhibits no discharge. Left eye exhibits no discharge. No scleral icterus.   No conjunctival pallor   Neck: Normal range of motion. Neck supple. No tracheal deviation present.   Cardiovascular: Regular rhythm, normal heart sounds and intact distal pulses.  Exam reveals no gallop and no friction rub.    No murmur heard.  Regular tachycardia   Pulmonary/Chest: Effort normal and breath sounds normal. No stridor. No respiratory distress. She has no wheezes. She has no rales.   Abdominal: Soft. Bowel sounds are normal. She exhibits no distension and no mass. There is tenderness. There is no guarding.   Suprapubic tenderness on palpation  R-CVAT   Musculoskeletal: Normal range of motion. She exhibits no deformity.   Neurological: She is alert and oriented to person, place, and time. She exhibits normal muscle tone. Coordination normal.   Skin: Skin is warm and dry. No pallor.   Psychiatric: Her behavior is normal. Judgment and thought content normal.   Nursing note and vitals reviewed.      Procedures         ED Course  ED Course   Comment By Time   Reviewed urine cultures.  2-18 60,000 - 95k861 CFU urinary eugenio  2- in process no report Sathish Xavier MD 02/21 3342   Pt currently on Macrobid Sathish Xavier MD 02/21 6107     CT Abdomen Pelvis With Contrast   ED Interpretation   CT abdomen and pelvis with IV contrast   (Delayed renal, ureteral and bladder images)    Faxed report from virtual radiologic Dr. Behar   Findings:    Kidneys and ureters: The kidneys are normal.  There is a simple   cyst in the right kidney.   Impression:   1.  Fatty liver   2.  Moderate stool burden        Labs Reviewed   URINALYSIS W/ CULTURE IF INDICATED - Abnormal; Notable for the following:        Result Value    Color, UA Red (*)     Appearance, UA Turbid (*)     Bilirubin, UA Small (1+) (*)     Blood, UA Large (3+) (*)     Protein, UA 30 mg/dL (1+) (*)     Leuk Esterase, UA Moderate (2+) (*)     Nitrite, UA Positive (*)     All other components within normal limits   URINE DRUG SCREEN - Abnormal; Notable for the following:     Opiate Screen Positive (*)     THC, Screen, Urine Positive (*)     All other components within normal limits    Narrative:     Negative Thresholds For Drugs Screened:                  Amphetamines              1000 ng/ml               Barbiturates               200 ng/ml               Benzodiazepines            200 ng/ml              Cocaine                    300 ng/ml              Methadone                  300 ng/ml              Opiates                    300 ng/ml               Phencyclidine               25 ng/ml               Propoxyphene               300 ng/ml              THC                         50 ng/ml    The reference range for all drugs tested is negative. This report includes final unconfirmed qualitative results to be used for medical treatment purposes only. Unconfirmed results must not be used for non-medical purposes such as employment or legal testing. Clinical consideration should be applied to any drug of abuse test, especially when unconfirmed quantitative results are used.     COMPREHENSIVE METABOLIC PANEL - Abnormal; Notable for the following:     Total Protein 8.1 (*)     ALT (SGPT) 85 (*)     AST (SGOT) 69 (*)     All other components within normal limits   CBC WITH AUTO DIFFERENTIAL - Abnormal; Notable for the following:     MCV 94.3 (*)      MPV 10.6 (*)     Lymphocytes, Absolute 3.39 (*)     All other components within normal limits   URINALYSIS, MICROSCOPIC ONLY - Abnormal; Notable for the following:     RBC, UA Too Numerous to Count (*)     WBC, UA Unable to determine due to loaded field (*)     Bacteria, UA Unable to determine due to loaded field (*)     Squamous Epithelial Cells, UA Unable to determine due to loaded field (*)     All other components within normal limits   VALPROIC ACID LEVEL, TOTAL - Abnormal; Notable for the following:     Valproic Acid <1.0 (*)     All other components within normal limits   OXYCODONE, URINE - Normal    Narrative:     Oxycodone Screen Detects:    Oxycodone          100 ng/mL    Hydrocodone       1562 ng/mL    Codeine          83569 ng/mL    Dihydrocodeine   59949 ng/mL    Ethylmorphine    89562 ng/mL    Hydromorphone    99743 ng/mL    Oxymorphone      1562  ng/mL    Thebaine         63964 ng/mL   OSMOLALITY, CALCULATED - Normal   URINE CULTURE   CBC AND DIFFERENTIAL    Narrative:     The following orders were created for panel order CBC & Differential.  Procedure                               Abnormality         Status                     ---------                               -----------         ------                     CBC Auto Differential[[id number]]         Abnormal            Final result                 Please view results for these tests on the individual orders.        Medication List      CONTINUE taking these medications          amoxicillin-clavulanate 875-125 MG per tablet   Commonly known as:  AUGMENTIN   Take 1 tablet by mouth 2 (Two) Times a Day.       azelastine 0.15 % solution nasal spray   Commonly known as:  ASTEPRO   2 sprays into each nostril 2 (Two) Times a Day.       busPIRone 15 MG tablet   Commonly known as:  BUSPAR       butalbital-acetaminophen-caffeine -40 MG per tablet   Commonly known as:  FIORICET   Take 1-2 tablets by mouth Every 6 (Six) Hours As Needed for headaches or    migraine.       carbidopa-levodopa  MG per tablet   Commonly known as:  SINEMET       diflunisal 500 MG tablet tablet   Commonly known as:  DOLOBID   Take 1 tablet by mouth 2 (Two) Times a Day.       divalproex 500 MG DR tablet   Commonly known as:  DEPAKOTE       famotidine 40 MG tablet   Commonly known as:  PEPCID       fesoterodine fumarate 8 MG tablet sustained-release 24 hour capsule   Commonly known as:  TOVIAZ   Take 1 tablet by mouth Daily.       HYDROcodone-acetaminophen 5-325 MG per tablet   Commonly known as:  NORCO   Take 1 tablet by mouth Every 6 (Six) Hours As Needed for moderate pain   (4-6).       nitrofurantoin (macrocrystal-monohydrate) 100 MG capsule   Commonly known as:  MACROBID   Take 1 capsule by mouth 2 (Two) Times a Day.       * ondansetron ODT 4 MG disintegrating tablet   Commonly known as:  ZOFRAN-ODT   Take 1 tablet by mouth 4 (Four) Times a Day.       * ondansetron ODT 4 MG disintegrating tablet   Commonly known as:  ZOFRAN-ODT   Take 1 tablet by mouth Every 6 (Six) Hours As Needed for nausea or   vomiting.       oxybutynin 5 MG tablet   Commonly known as:  DITROPAN   Take 1 tablet by mouth Daily for 30 days.       pantoprazole 40 MG EC tablet   Commonly known as:  PROTONIX       phenazopyridine 200 MG tablet   Commonly known as:  PYRIDIUM   Take 1 tablet by mouth 3 (Three) Times a Day As Needed for bladder spasms.         sertraline 100 MG tablet   Commonly known as:  ZOLOFT       * Notice:  This list has 2 medication(s) that are the same as other   medications prescribed for you. Read the directions carefully, and ask   your doctor or other care provider to review them with you.                     MDM  Number of Diagnoses or Management Options  Acute cystitis with hematuria: new and requires workup  Gross hematuria: new and requires workup     Amount and/or Complexity of Data Reviewed  Clinical lab tests: ordered and reviewed  Tests in the radiology section of CPT®: ordered  and reviewed  Decide to obtain previous medical records or to obtain history from someone other than the patient: yes  Independent visualization of images, tracings, or specimens: yes    Risk of Complications, Morbidity, and/or Mortality  Presenting problems: moderate  Diagnostic procedures: high  Management options: moderate    Patient Progress  Patient progress: stable      Final diagnoses:   Gross hematuria   Acute cystitis with hematuria            Sathish Xavier MD  02/21/17 3505

## 2017-02-23 LAB — BACTERIA SPEC AEROBE CULT: NORMAL

## 2017-03-07 ENCOUNTER — HOSPITAL ENCOUNTER (EMERGENCY)
Facility: HOSPITAL | Age: 42
Discharge: HOME | End: 2017-03-07
Attending: EMERGENCY MEDICINE | Admitting: EMERGENCY MEDICINE

## 2017-03-07 VITALS
TEMPERATURE: 97.5 F | WEIGHT: 153 LBS | DIASTOLIC BLOOD PRESSURE: 80 MMHG | OXYGEN SATURATION: 98 % | RESPIRATION RATE: 16 BRPM | HEIGHT: 69 IN | BODY MASS INDEX: 22.66 KG/M2 | SYSTOLIC BLOOD PRESSURE: 120 MMHG | HEART RATE: 68 BPM

## 2017-03-07 DIAGNOSIS — G43.009 NONINTRACTABLE MIGRAINE, UNSPECIFIED MIGRAINE TYPE: Primary | ICD-10-CM

## 2017-03-07 PROCEDURE — 25010000002 ONDANSETRON PER 1 MG: Performed by: PHYSICIAN ASSISTANT

## 2017-03-07 PROCEDURE — 96374 THER/PROPH/DIAG INJ IV PUSH: CPT

## 2017-03-07 PROCEDURE — 96361 HYDRATE IV INFUSION ADD-ON: CPT

## 2017-03-07 PROCEDURE — 25010000002 DEXAMETHASONE PER 1 MG: Performed by: EMERGENCY MEDICINE

## 2017-03-07 PROCEDURE — 25010000002 MORPHINE PER 10 MG: Performed by: EMERGENCY MEDICINE

## 2017-03-07 PROCEDURE — 99284 EMERGENCY DEPT VISIT MOD MDM: CPT

## 2017-03-07 PROCEDURE — 25010000002 DIPHENHYDRAMINE PER 50 MG: Performed by: EMERGENCY MEDICINE

## 2017-03-07 PROCEDURE — 96375 TX/PRO/DX INJ NEW DRUG ADDON: CPT

## 2017-03-07 RX ORDER — TOPIRAMATE 25 MG/1
25 CAPSULE, COATED PELLETS ORAL 2 TIMES DAILY
COMMUNITY
End: 2017-04-27

## 2017-03-07 RX ORDER — ONDANSETRON 2 MG/ML
4 INJECTION INTRAMUSCULAR; INTRAVENOUS ONCE
Status: COMPLETED | OUTPATIENT
Start: 2017-03-07 | End: 2017-03-07

## 2017-03-07 RX ORDER — DIPHENHYDRAMINE HYDROCHLORIDE 50 MG/ML
25 INJECTION INTRAMUSCULAR; INTRAVENOUS ONCE
Status: COMPLETED | OUTPATIENT
Start: 2017-03-07 | End: 2017-03-07

## 2017-03-07 RX ORDER — SODIUM CHLORIDE 0.9 % (FLUSH) 0.9 %
10 SYRINGE (ML) INJECTION AS NEEDED
Status: DISCONTINUED | OUTPATIENT
Start: 2017-03-07 | End: 2017-03-07 | Stop reason: HOSPADM

## 2017-03-07 RX ORDER — DIPHENHYDRAMINE HYDROCHLORIDE 50 MG/ML
25 INJECTION INTRAMUSCULAR; INTRAVENOUS EVERY 6 HOURS PRN
Status: DISCONTINUED | OUTPATIENT
Start: 2017-03-07 | End: 2017-03-07 | Stop reason: HOSPADM

## 2017-03-07 RX ADMIN — DEXAMETHASONE SODIUM PHOSPHATE 10 MG: 4 INJECTION, SOLUTION INTRAMUSCULAR; INTRAVENOUS at 15:23

## 2017-03-07 RX ADMIN — ONDANSETRON 4 MG: 2 INJECTION, SOLUTION INTRAMUSCULAR; INTRAVENOUS at 15:12

## 2017-03-07 RX ADMIN — MORPHINE SULFATE 4 MG: 4 INJECTION, SOLUTION INTRAMUSCULAR; INTRAVENOUS at 15:15

## 2017-03-07 RX ADMIN — DIPHENHYDRAMINE HYDROCHLORIDE 25 MG: 50 INJECTION INTRAMUSCULAR; INTRAVENOUS at 15:13

## 2017-03-07 RX ADMIN — SODIUM CHLORIDE 1000 ML: 9 INJECTION, SOLUTION INTRAVENOUS at 15:22

## 2017-03-07 NOTE — ED PROVIDER NOTES
Subjective   Patient is a 42 y.o. female presenting with migraines.   History provided by:  Patient   used: No    Migraine   Pain location:  L temporal and R temporal  Quality:  Sharp  Radiates to:  Does not radiate  Severity at highest:  8/10  Onset quality:  Gradual  Timing:  Constant  Progression:  Worsening  Chronicity:  New  Similar to prior headaches: no    Context: bright light    Context: not caffeine, not eating and not straining    Relieved by:  Nothing  Worsened by:  Nothing  Associated symptoms: no abdominal pain, no blurred vision, no diarrhea, no dizziness, no facial pain, no fatigue, no fever, no hearing loss, no loss of balance, no neck pain, no neck stiffness, no numbness, no paresthesias, no photophobia, no swollen glands, no syncope, no tingling, no visual change and no vomiting        Review of Systems   Constitutional: Negative for fatigue and fever.   HENT: Negative for hearing loss.    Eyes: Negative for blurred vision and photophobia.   Cardiovascular: Negative for syncope.   Gastrointestinal: Negative for abdominal pain, diarrhea and vomiting.   Musculoskeletal: Negative for neck pain and neck stiffness.   Neurological: Positive for headaches. Negative for dizziness, numbness, paresthesias and loss of balance.   All other systems reviewed and are negative.      Past Medical History   Diagnosis Date   • Abdominal pain    • Abdominal swelling    • Bipolar 1 disorder    • Brain tumor      R Frontal Lobe per pt   • Constipation    • Diarrhea    • Fibromyalgia    • IBS (irritable bowel syndrome)    • Migraine    • Nausea & vomiting    • PONV (postoperative nausea and vomiting)    • PTSD (post-traumatic stress disorder)    • Rectal bleeding        Allergies   Allergen Reactions   • Ativan [Lorazepam] Hallucinations     confusion   • Sulfa Antibiotics Shortness Of Breath and Swelling   • Compazine [Prochlorperazine Edisylate] Hives   • Demerol [Meperidine] Hives   • Droperidol     • Prochlorperazine    • Toradol [Ketorolac Tromethamine] Hives and Itching       Past Surgical History   Procedure Laterality Date   • Hysterectomy     • Appendectomy     • Shoulder surgery       3 times   • Knee surgery     • Endoscopy N/A 6/30/2016     Procedure: ESOPHAGOGASTRODUODENOSCOPY WITH BIOPSY  CPTCODE:43337;  Surgeon: Jose Antonio Belle III, MD;  Location:  [facility] OR;  Service:    • Colonoscopy N/A 6/30/2016     Procedure: COLONOSCOPY  CPTCODE:95117;  Surgeon: Jose Antonio Belle III, MD;  Location:  [facility] OR;  Service:    • Colonoscopy N/A 7/7/2016     Procedure: COLONOSCOPY (38358) CPT;  Surgeon: Jose Antonio Belle III, MD;  Location:  [facility] OR;  Service:    • Hemorrhoidectomy N/A 7/28/2016     Procedure: HEMORRHOID STAPLING;  Surgeon: Kael Lopez MD;  Location: [facility] OR;  Service:    • Anal scope N/A 7/28/2016     Procedure: ANAL SCOPE;  Surgeon: Kael Lopez MD;  Location: [facility] OR;  Service:        Family History   Problem Relation Age of Onset   • Crohn's disease Other    • Hypertension Other    • Diabetes Other    • Irritable bowel syndrome Other        Social History     Social History   • Marital status:      Spouse name: N/A   • Number of children: N/A   • Years of education: N/A     Social History Main Topics   • Smoking status: Former Smoker     Types: Cigarettes     Quit date: 11/1/2015   • Smokeless tobacco: Never Used   • Alcohol use No   • Drug use: Yes     Special: Marijuana   • Sexual activity: Defer     Other Topics Concern   • None     Social History Narrative           Objective   Physical Exam   Constitutional: She is oriented to person, place, and time. She appears well-developed and well-nourished.   HENT:   Head: Normocephalic.   Right Ear: External ear normal.   Left Ear: External ear normal.   Nose: Nose normal.   Mouth/Throat: Oropharynx is clear and moist.   Eyes: Conjunctivae and EOM are normal. Pupils are equal, round, and reactive to light.   Neck: Normal  "range of motion. Neck supple. No tracheal deviation present. No thyromegaly present.   Cardiovascular: Normal rate, regular rhythm, normal heart sounds and intact distal pulses.    Pulmonary/Chest: Effort normal and breath sounds normal.   Abdominal: Soft. Bowel sounds are normal.   Musculoskeletal: Normal range of motion.   Neurological: She is alert and oriented to person, place, and time. She has normal reflexes.   Skin: Skin is warm and dry.   Psychiatric: She has a normal mood and affect. Her behavior is normal. Judgment and thought content normal.   Nursing note and vitals reviewed.      Procedures         ED Course  ED Course   Comment By Time   This is a 42-year-old female comes in with chief complaint migraine headache ×5 days.  Patient describes pain as sharp, stabbing pain located in her temporal region.  Patient denies any fever, chills, neck stiffness, nausea, vomiting.  Patient is poor she has a long history of migraine headaches that she sees neurologist Dr. Pérez for.  Patient does state this was\" of her typical migraines. Mark Stubbs PA-C 03/07 1402   Patient states feels better this time.  Will be discharged past follow-up primary care physician next one to days. Mark Stubbs PA-C 03/07 1648                  MDM  Number of Diagnoses or Management Options  Nonintractable migraine, unspecified migraine type: new and requires workup  Risk of Complications, Morbidity, and/or Mortality  Presenting problems: moderate  Diagnostic procedures: moderate  Management options: moderate    Patient Progress  Patient progress: stable      Final diagnoses:   Nonintractable migraine, unspecified migraine type            Mark Stubbs PA-C  03/07/17 1643    "

## 2017-03-12 ENCOUNTER — APPOINTMENT (OUTPATIENT)
Dept: GENERAL RADIOLOGY | Facility: HOSPITAL | Age: 42
End: 2017-03-12

## 2017-03-12 ENCOUNTER — HOSPITAL ENCOUNTER (EMERGENCY)
Facility: HOSPITAL | Age: 42
Discharge: HOME | End: 2017-03-12
Attending: EMERGENCY MEDICINE | Admitting: EMERGENCY MEDICINE

## 2017-03-12 VITALS
SYSTOLIC BLOOD PRESSURE: 112 MMHG | OXYGEN SATURATION: 98 % | WEIGHT: 153 LBS | TEMPERATURE: 98 F | HEIGHT: 69 IN | HEART RATE: 79 BPM | BODY MASS INDEX: 22.66 KG/M2 | DIASTOLIC BLOOD PRESSURE: 81 MMHG | RESPIRATION RATE: 16 BRPM

## 2017-03-12 DIAGNOSIS — R31.0 GROSS HEMATURIA: Primary | ICD-10-CM

## 2017-03-12 LAB
ANION GAP SERPL CALCULATED.3IONS-SCNC: 4.8 MMOL/L (ref 3.6–11.2)
BACTERIA UR QL AUTO: ABNORMAL /HPF
BASOPHILS # BLD AUTO: 0.01 10*3/MM3 (ref 0–0.3)
BASOPHILS NFR BLD AUTO: 0.1 % (ref 0–2)
BILIRUB UR QL STRIP: NEGATIVE
BUN BLD-MCNC: 7 MG/DL (ref 7–21)
BUN/CREAT SERPL: 13.7 (ref 7–25)
CALCIUM SPEC-SCNC: 8.8 MG/DL (ref 7.7–10)
CHLORIDE SERPL-SCNC: 106 MMOL/L (ref 99–112)
CLARITY UR: ABNORMAL
CO2 SERPL-SCNC: 29.2 MMOL/L (ref 24.3–31.9)
COLOR UR: ABNORMAL
CREAT BLD-MCNC: 0.51 MG/DL (ref 0.43–1.29)
DEPRECATED RDW RBC AUTO: 45.5 FL (ref 37–54)
EOSINOPHIL # BLD AUTO: 0.11 10*3/MM3 (ref 0–0.7)
EOSINOPHIL NFR BLD AUTO: 1.3 % (ref 0–5)
ERYTHROCYTE [DISTWIDTH] IN BLOOD BY AUTOMATED COUNT: 13 % (ref 11.5–14.5)
GFR SERPL CREATININE-BSD FRML MDRD: 132 ML/MIN/1.73
GLUCOSE BLD-MCNC: 89 MG/DL (ref 70–110)
GLUCOSE UR STRIP-MCNC: NEGATIVE MG/DL
HCT VFR BLD AUTO: 40.4 % (ref 37–47)
HGB BLD-MCNC: 13.4 G/DL (ref 12–16)
HGB UR QL STRIP.AUTO: ABNORMAL
IMM GRANULOCYTES # BLD: 0.04 10*3/MM3 (ref 0–0.03)
IMM GRANULOCYTES NFR BLD: 0.5 % (ref 0–0.5)
KETONES UR QL STRIP: NEGATIVE
LEUKOCYTE ESTERASE UR QL STRIP.AUTO: ABNORMAL
LYMPHOCYTES # BLD AUTO: 3.32 10*3/MM3 (ref 1–3)
LYMPHOCYTES NFR BLD AUTO: 40 % (ref 21–51)
MCH RBC QN AUTO: 31.7 PG (ref 27–33)
MCHC RBC AUTO-ENTMCNC: 33.2 G/DL (ref 33–37)
MCV RBC AUTO: 95.5 FL (ref 80–94)
MONOCYTES # BLD AUTO: 0.52 10*3/MM3 (ref 0.1–0.9)
MONOCYTES NFR BLD AUTO: 6.3 % (ref 0–10)
NEUTROPHILS # BLD AUTO: 4.3 10*3/MM3 (ref 1.4–6.5)
NEUTROPHILS NFR BLD AUTO: 51.8 % (ref 30–70)
NITRITE UR QL STRIP: NEGATIVE
OSMOLALITY SERPL CALC.SUM OF ELEC: 276.8 MOSM/KG (ref 273–305)
PH UR STRIP.AUTO: 6.5 [PH] (ref 5–8)
PLATELET # BLD AUTO: 120 10*3/MM3 (ref 130–400)
PMV BLD AUTO: 10.9 FL (ref 6–10)
POTASSIUM BLD-SCNC: 4 MMOL/L (ref 3.5–5.3)
PROT UR QL STRIP: ABNORMAL
RBC # BLD AUTO: 4.23 10*6/MM3 (ref 4.2–5.4)
RBC # UR: ABNORMAL /HPF
REF LAB TEST METHOD: ABNORMAL
SODIUM BLD-SCNC: 140 MMOL/L (ref 135–153)
SP GR UR STRIP: 1.01 (ref 1–1.03)
SQUAMOUS #/AREA URNS HPF: ABNORMAL /HPF
UROBILINOGEN UR QL STRIP: ABNORMAL
WBC NRBC COR # BLD: 8.3 10*3/MM3 (ref 4.5–12.5)
WBC UR QL AUTO: ABNORMAL /HPF

## 2017-03-12 PROCEDURE — 99283 EMERGENCY DEPT VISIT LOW MDM: CPT

## 2017-03-12 PROCEDURE — 96365 THER/PROPH/DIAG IV INF INIT: CPT

## 2017-03-12 PROCEDURE — 25010000002 PROMETHAZINE PER 50 MG: Performed by: EMERGENCY MEDICINE

## 2017-03-12 PROCEDURE — 36415 COLL VENOUS BLD VENIPUNCTURE: CPT

## 2017-03-12 PROCEDURE — 87086 URINE CULTURE/COLONY COUNT: CPT | Performed by: EMERGENCY MEDICINE

## 2017-03-12 PROCEDURE — 81001 URINALYSIS AUTO W/SCOPE: CPT | Performed by: EMERGENCY MEDICINE

## 2017-03-12 PROCEDURE — 80048 BASIC METABOLIC PNL TOTAL CA: CPT | Performed by: EMERGENCY MEDICINE

## 2017-03-12 PROCEDURE — 85025 COMPLETE CBC W/AUTO DIFF WBC: CPT | Performed by: EMERGENCY MEDICINE

## 2017-03-12 PROCEDURE — 96361 HYDRATE IV INFUSION ADD-ON: CPT

## 2017-03-12 PROCEDURE — 74000 HC ABDOMEN KUB: CPT

## 2017-03-12 PROCEDURE — 74020 XR ABDOMEN KUB: CPT | Performed by: RADIOLOGY

## 2017-03-12 RX ORDER — SODIUM CHLORIDE 0.9 % (FLUSH) 0.9 %
10 SYRINGE (ML) INJECTION AS NEEDED
Status: DISCONTINUED | OUTPATIENT
Start: 2017-03-12 | End: 2017-03-12 | Stop reason: HOSPADM

## 2017-03-12 RX ORDER — OXYCODONE HYDROCHLORIDE AND ACETAMINOPHEN 5; 325 MG/1; MG/1
1 TABLET ORAL ONCE
Status: COMPLETED | OUTPATIENT
Start: 2017-03-12 | End: 2017-03-12

## 2017-03-12 RX ORDER — PROMETHAZINE HYDROCHLORIDE 25 MG/1
25 TABLET ORAL EVERY 6 HOURS PRN
Qty: 12 TABLET | Refills: 0 | Status: SHIPPED | OUTPATIENT
Start: 2017-03-12 | End: 2017-05-11 | Stop reason: SDUPTHER

## 2017-03-12 RX ADMIN — SODIUM CHLORIDE 1000 ML: 9 INJECTION, SOLUTION INTRAVENOUS at 14:23

## 2017-03-12 RX ADMIN — OXYCODONE HYDROCHLORIDE AND ACETAMINOPHEN 1 TABLET: 5; 325 TABLET ORAL at 16:07

## 2017-03-12 RX ADMIN — PROMETHAZINE HYDROCHLORIDE 12.5 MG: 25 INJECTION INTRAMUSCULAR; INTRAVENOUS at 14:23

## 2017-03-13 NOTE — ED PROVIDER NOTES
Subjective   HPI Comments: Patient with several visits for H/A and hematuria. Thinks it could be a kidney stone.    Patient is a 42 y.o. female presenting with flank pain.   History provided by:  Patient   used: No    Flank Pain   Pain location:  R flank  Pain quality: aching and sharp    Pain radiates to:  Does not radiate  Pain severity:  Moderate  Onset quality:  Sudden  Duration:  3 days  Timing:  Intermittent  Progression:  Worsening  Chronicity:  Recurrent  Context: awakening from sleep    Relieved by:  Nothing  Worsened by:  Movement and coughing  Ineffective treatments:  None tried  Associated symptoms: fatigue, hematuria and nausea    Associated symptoms: no chest pain, no dysuria and no fever        Review of Systems   Constitutional: Positive for fatigue. Negative for fever.   HENT: Negative.    Respiratory: Negative.    Cardiovascular: Negative.  Negative for chest pain.   Gastrointestinal: Positive for nausea. Negative for abdominal pain.   Endocrine: Negative.    Genitourinary: Positive for flank pain and hematuria. Negative for dysuria.   Skin: Negative.    Neurological: Negative.    Psychiatric/Behavioral: Negative.    All other systems reviewed and are negative.      Past Medical History   Diagnosis Date   • Abdominal pain    • Abdominal swelling    • Bipolar 1 disorder    • Brain tumor      R Frontal Lobe per pt   • Constipation    • Diarrhea    • Fibromyalgia    • IBS (irritable bowel syndrome)    • Migraine    • Nausea & vomiting    • PONV (postoperative nausea and vomiting)    • PTSD (post-traumatic stress disorder)    • Rectal bleeding        Allergies   Allergen Reactions   • Ativan [Lorazepam] Hallucinations     confusion   • Sulfa Antibiotics Shortness Of Breath and Swelling   • Compazine [Prochlorperazine Edisylate] Hives   • Demerol [Meperidine] Hives   • Droperidol    • Prochlorperazine    • Toradol [Ketorolac Tromethamine] Hives and Itching       Past Surgical History    Procedure Laterality Date   • Hysterectomy     • Appendectomy     • Shoulder surgery       3 times   • Knee surgery     • Endoscopy N/A 6/30/2016     Procedure: ESOPHAGOGASTRODUODENOSCOPY WITH BIOPSY  CPTCODE:98527;  Surgeon: Jose Antonio Belle III, MD;  Location:  [facility] OR;  Service:    • Colonoscopy N/A 6/30/2016     Procedure: COLONOSCOPY  CPTCODE:73010;  Surgeon: Jose Antonio Belle III, MD;  Location:  [facility] OR;  Service:    • Colonoscopy N/A 7/7/2016     Procedure: COLONOSCOPY (95538) CPT;  Surgeon: Jose Antonio Belle III, MD;  Location:  [facility] OR;  Service:    • Hemorrhoidectomy N/A 7/28/2016     Procedure: HEMORRHOID STAPLING;  Surgeon: Kael Lopez MD;  Location: [facility] OR;  Service:    • Anal scope N/A 7/28/2016     Procedure: ANAL SCOPE;  Surgeon: Kael Lopez MD;  Location: [facility] OR;  Service:        Family History   Problem Relation Age of Onset   • Crohn's disease Other    • Hypertension Other    • Diabetes Other    • Irritable bowel syndrome Other        Social History     Social History   • Marital status:      Spouse name: N/A   • Number of children: N/A   • Years of education: N/A     Social History Main Topics   • Smoking status: Former Smoker     Types: Cigarettes     Quit date: 11/1/2015   • Smokeless tobacco: Never Used   • Alcohol use No   • Drug use: Yes     Special: Marijuana   • Sexual activity: Defer     Other Topics Concern   • None     Social History Narrative           Objective   Physical Exam   Constitutional: She is oriented to person, place, and time. She appears well-developed and well-nourished. No distress.   Does not appear to be in any pain.   HENT:   Head: Normocephalic and atraumatic.   Right Ear: External ear normal.   Left Ear: External ear normal.   Nose: Nose normal.   Eyes: Conjunctivae and EOM are normal. Pupils are equal, round, and reactive to light.   Neck: Normal range of motion. Neck supple. No JVD present. No tracheal deviation present.    Cardiovascular: Normal rate, regular rhythm and normal heart sounds.    No murmur heard.  Pulmonary/Chest: Effort normal and breath sounds normal. No respiratory distress. She has no wheezes.   Abdominal: Soft. Bowel sounds are normal. There is no tenderness.   Musculoskeletal: Normal range of motion. She exhibits no edema or deformity.   Neurological: She is alert and oriented to person, place, and time. No cranial nerve deficit.   Skin: Skin is warm and dry. No rash noted. She is not diaphoretic. No erythema. No pallor.   Psychiatric: She has a normal mood and affect. Her behavior is normal. Thought content normal.   Nursing note and vitals reviewed.      Procedures         ED Course  ED Course      Discussed with patient that last 6 CT's of abd & pelvis in the last year have failed to ever show evidence of a kidney stone.            MDM    Final diagnoses:   Gross hematuria            Kael Mak MD  03/12/17 0383

## 2017-03-14 ENCOUNTER — APPOINTMENT (OUTPATIENT)
Dept: CT IMAGING | Facility: HOSPITAL | Age: 42
End: 2017-03-14

## 2017-03-14 ENCOUNTER — HOSPITAL ENCOUNTER (EMERGENCY)
Facility: HOSPITAL | Age: 42
Discharge: HOME | End: 2017-03-15
Attending: EMERGENCY MEDICINE | Admitting: EMERGENCY MEDICINE

## 2017-03-14 DIAGNOSIS — G89.29 CHRONIC INTRACTABLE HEADACHE, UNSPECIFIED HEADACHE TYPE: ICD-10-CM

## 2017-03-14 DIAGNOSIS — K52.9 COLITIS: Primary | ICD-10-CM

## 2017-03-14 DIAGNOSIS — R51.9 CHRONIC INTRACTABLE HEADACHE, UNSPECIFIED HEADACHE TYPE: ICD-10-CM

## 2017-03-14 LAB
ALBUMIN SERPL-MCNC: 4.3 G/DL (ref 3.5–5)
ALBUMIN/GLOB SERPL: 1.4 G/DL (ref 1.5–2.5)
ALP SERPL-CCNC: 66 U/L (ref 35–104)
ALT SERPL W P-5'-P-CCNC: 63 U/L (ref 10–36)
AMPHET+METHAMPHET UR QL: NEGATIVE
AMYLASE SERPL-CCNC: 65 U/L (ref 28–100)
ANION GAP SERPL CALCULATED.3IONS-SCNC: 3.2 MMOL/L (ref 3.6–11.2)
AST SERPL-CCNC: 56 U/L (ref 10–30)
B-HCG UR QL: NEGATIVE
BARBITURATES UR QL SCN: POSITIVE
BASOPHILS # BLD AUTO: 0.01 10*3/MM3 (ref 0–0.3)
BASOPHILS NFR BLD AUTO: 0.1 % (ref 0–2)
BENZODIAZ UR QL SCN: NEGATIVE
BILIRUB SERPL-MCNC: 0.4 MG/DL (ref 0.2–1.8)
BILIRUB UR QL STRIP: NEGATIVE
BUN BLD-MCNC: 13 MG/DL (ref 7–21)
BUN/CREAT SERPL: 19.7 (ref 7–25)
BUPRENORPHINE+NOR UR QL SCN: NEGATIVE
CALCIUM SPEC-SCNC: 9.5 MG/DL (ref 7.7–10)
CANNABINOIDS SERPL QL: POSITIVE
CHLORIDE SERPL-SCNC: 104 MMOL/L (ref 99–112)
CLARITY UR: CLEAR
CO2 SERPL-SCNC: 31.8 MMOL/L (ref 24.3–31.9)
COCAINE UR QL: NEGATIVE
COLOR UR: YELLOW
CREAT BLD-MCNC: 0.66 MG/DL (ref 0.43–1.29)
DEPRECATED RDW RBC AUTO: 42.8 FL (ref 37–54)
EOSINOPHIL # BLD AUTO: 0.13 10*3/MM3 (ref 0–0.7)
EOSINOPHIL NFR BLD AUTO: 1.3 % (ref 0–5)
ERYTHROCYTE [DISTWIDTH] IN BLOOD BY AUTOMATED COUNT: 12.8 % (ref 11.5–14.5)
GFR SERPL CREATININE-BSD FRML MDRD: 98 ML/MIN/1.73
GLOBULIN UR ELPH-MCNC: 3.1 GM/DL
GLUCOSE BLD-MCNC: 97 MG/DL (ref 70–110)
GLUCOSE UR STRIP-MCNC: NEGATIVE MG/DL
HCT VFR BLD AUTO: 42.8 % (ref 37–47)
HGB BLD-MCNC: 14.1 G/DL (ref 12–16)
HGB UR QL STRIP.AUTO: NEGATIVE
IMM GRANULOCYTES # BLD: 0.03 10*3/MM3 (ref 0–0.03)
IMM GRANULOCYTES NFR BLD: 0.3 % (ref 0–0.5)
KETONES UR QL STRIP: NEGATIVE
LEUKOCYTE ESTERASE UR QL STRIP.AUTO: NEGATIVE
LIPASE SERPL-CCNC: 45 U/L (ref 13–60)
LYMPHOCYTES # BLD AUTO: 2.46 10*3/MM3 (ref 1–3)
LYMPHOCYTES NFR BLD AUTO: 24.5 % (ref 21–51)
MCH RBC QN AUTO: 31 PG (ref 27–33)
MCHC RBC AUTO-ENTMCNC: 32.9 G/DL (ref 33–37)
MCV RBC AUTO: 94.1 FL (ref 80–94)
METHADONE UR QL SCN: NEGATIVE
MONOCYTES # BLD AUTO: 0.76 10*3/MM3 (ref 0.1–0.9)
MONOCYTES NFR BLD AUTO: 7.6 % (ref 0–10)
NEUTROPHILS # BLD AUTO: 6.66 10*3/MM3 (ref 1.4–6.5)
NEUTROPHILS NFR BLD AUTO: 66.2 % (ref 30–70)
NITRITE UR QL STRIP: NEGATIVE
OPIATES UR QL: NEGATIVE
OSMOLALITY SERPL CALC.SUM OF ELEC: 277.6 MOSM/KG (ref 273–305)
OXYCODONE UR QL SCN: NEGATIVE
PCP UR QL SCN: NEGATIVE
PH UR STRIP.AUTO: 7 [PH] (ref 5–8)
PLATELET # BLD AUTO: 132 10*3/MM3 (ref 130–400)
PMV BLD AUTO: 10.4 FL (ref 6–10)
POTASSIUM BLD-SCNC: 4.9 MMOL/L (ref 3.5–5.3)
PROPOXYPH UR QL: NEGATIVE
PROT SERPL-MCNC: 7.4 G/DL (ref 6–8)
PROT UR QL STRIP: NEGATIVE
RBC # BLD AUTO: 4.55 10*6/MM3 (ref 4.2–5.4)
SODIUM BLD-SCNC: 139 MMOL/L (ref 135–153)
SP GR UR STRIP: 1.01 (ref 1–1.03)
UROBILINOGEN UR QL STRIP: NORMAL
WBC NRBC COR # BLD: 10.05 10*3/MM3 (ref 4.5–12.5)

## 2017-03-14 PROCEDURE — 80307 DRUG TEST PRSMV CHEM ANLYZR: CPT | Performed by: EMERGENCY MEDICINE

## 2017-03-14 PROCEDURE — 74177 CT ABD & PELVIS W/CONTRAST: CPT

## 2017-03-14 PROCEDURE — 74177 CT ABD & PELVIS W/CONTRAST: CPT | Performed by: RADIOLOGY

## 2017-03-14 PROCEDURE — 36415 COLL VENOUS BLD VENIPUNCTURE: CPT

## 2017-03-14 PROCEDURE — 81025 URINE PREGNANCY TEST: CPT | Performed by: EMERGENCY MEDICINE

## 2017-03-14 PROCEDURE — 80307 DRUG TEST PRSMV CHEM ANLYZR: CPT | Performed by: PHYSICIAN ASSISTANT

## 2017-03-14 PROCEDURE — 96375 TX/PRO/DX INJ NEW DRUG ADDON: CPT

## 2017-03-14 PROCEDURE — 25010000002 BUTORPHANOL PER 1 MG: Performed by: PHYSICIAN ASSISTANT

## 2017-03-14 PROCEDURE — 70450 CT HEAD/BRAIN W/O DYE: CPT | Performed by: RADIOLOGY

## 2017-03-14 PROCEDURE — 81003 URINALYSIS AUTO W/O SCOPE: CPT | Performed by: EMERGENCY MEDICINE

## 2017-03-14 PROCEDURE — 96374 THER/PROPH/DIAG INJ IV PUSH: CPT

## 2017-03-14 PROCEDURE — 99283 EMERGENCY DEPT VISIT LOW MDM: CPT

## 2017-03-14 PROCEDURE — 0 IOPAMIDOL 61 % SOLUTION: Performed by: EMERGENCY MEDICINE

## 2017-03-14 PROCEDURE — 70450 CT HEAD/BRAIN W/O DYE: CPT

## 2017-03-14 PROCEDURE — 83690 ASSAY OF LIPASE: CPT | Performed by: PHYSICIAN ASSISTANT

## 2017-03-14 PROCEDURE — 85025 COMPLETE CBC W/AUTO DIFF WBC: CPT | Performed by: PHYSICIAN ASSISTANT

## 2017-03-14 PROCEDURE — 96361 HYDRATE IV INFUSION ADD-ON: CPT

## 2017-03-14 PROCEDURE — 25010000002 PROMETHAZINE PER 50 MG: Performed by: PHYSICIAN ASSISTANT

## 2017-03-14 PROCEDURE — 80053 COMPREHEN METABOLIC PANEL: CPT | Performed by: PHYSICIAN ASSISTANT

## 2017-03-14 PROCEDURE — 82150 ASSAY OF AMYLASE: CPT | Performed by: PHYSICIAN ASSISTANT

## 2017-03-14 RX ORDER — SODIUM CHLORIDE 0.9 % (FLUSH) 0.9 %
10 SYRINGE (ML) INJECTION AS NEEDED
Status: DISCONTINUED | OUTPATIENT
Start: 2017-03-14 | End: 2017-03-15 | Stop reason: HOSPADM

## 2017-03-14 RX ADMIN — SODIUM CHLORIDE 1000 ML: 9 INJECTION, SOLUTION INTRAVENOUS at 23:43

## 2017-03-14 RX ADMIN — PROMETHAZINE HYDROCHLORIDE 25 MG: 25 INJECTION INTRAMUSCULAR; INTRAVENOUS at 22:47

## 2017-03-14 RX ADMIN — IOPAMIDOL 100 ML: 612 INJECTION, SOLUTION INTRAVENOUS at 23:41

## 2017-03-14 RX ADMIN — BUTORPHANOL TARTRATE 2 MG: 2 INJECTION, SOLUTION INTRAMUSCULAR; INTRAVENOUS at 22:58

## 2017-03-14 RX ADMIN — SODIUM CHLORIDE 1000 ML: 9 INJECTION, SOLUTION INTRAVENOUS at 22:46

## 2017-03-15 VITALS
BODY MASS INDEX: 23.11 KG/M2 | DIASTOLIC BLOOD PRESSURE: 74 MMHG | WEIGHT: 156 LBS | TEMPERATURE: 98 F | HEIGHT: 69 IN | SYSTOLIC BLOOD PRESSURE: 122 MMHG | HEART RATE: 86 BPM | RESPIRATION RATE: 18 BRPM | OXYGEN SATURATION: 98 %

## 2017-03-15 LAB — BACTERIA SPEC AEROBE CULT: NORMAL

## 2017-03-15 PROCEDURE — 96361 HYDRATE IV INFUSION ADD-ON: CPT

## 2017-03-15 RX ORDER — METRONIDAZOLE 500 MG/1
500 TABLET ORAL 2 TIMES DAILY
Qty: 20 TABLET | Refills: 0 | Status: SHIPPED | OUTPATIENT
Start: 2017-03-15 | End: 2017-04-27

## 2017-03-15 RX ORDER — METHYLPREDNISOLONE 4 MG/1
TABLET ORAL
Qty: 21 TABLET | Refills: 0 | Status: SHIPPED | OUTPATIENT
Start: 2017-03-15 | End: 2017-04-27

## 2017-03-15 RX ORDER — MECLIZINE HYDROCHLORIDE 25 MG/1
25 TABLET ORAL DAILY
Qty: 30 TABLET | Refills: 0 | Status: SHIPPED | OUTPATIENT
Start: 2017-03-15 | End: 2017-07-27

## 2017-03-15 NOTE — ED PROVIDER NOTES
Subjective   Patient is a 42 y.o. female presenting with abdominal pain.   History provided by:  Patient  Abdominal Pain   Pain location:  RUQ  Pain quality: aching and stabbing    Pain radiates to:  Does not radiate  Pain severity:  Severe  Onset quality:  Gradual  Timing:  Constant  Progression:  Worsening  Chronicity:  Recurrent  Relieved by:  Nothing  Associated symptoms: nausea and vomiting    Associated symptoms: no chest pain, no dysuria and no fever    Associated symptoms comment:  Headache.       Review of Systems   Constitutional: Negative.  Negative for fever.   HENT: Negative.    Respiratory: Negative.    Cardiovascular: Negative.  Negative for chest pain.   Gastrointestinal: Positive for abdominal pain, nausea and vomiting.   Endocrine: Negative.    Genitourinary: Negative.  Negative for dysuria.   Skin: Negative.    Neurological: Negative.    Psychiatric/Behavioral: Negative.    All other systems reviewed and are negative.      Past Medical History   Diagnosis Date   • Abdominal pain    • Abdominal swelling    • Bipolar 1 disorder    • Brain tumor      R Frontal Lobe per pt   • Constipation    • Diarrhea    • Fibromyalgia    • IBS (irritable bowel syndrome)    • Migraine    • Nausea & vomiting    • PONV (postoperative nausea and vomiting)    • PTSD (post-traumatic stress disorder)    • Rectal bleeding        Allergies   Allergen Reactions   • Ativan [Lorazepam] Hallucinations     confusion   • Sulfa Antibiotics Shortness Of Breath and Swelling   • Compazine [Prochlorperazine Edisylate] Hives   • Demerol [Meperidine] Hives   • Droperidol    • Prochlorperazine    • Toradol [Ketorolac Tromethamine] Hives and Itching       Past Surgical History   Procedure Laterality Date   • Hysterectomy     • Appendectomy     • Shoulder surgery       3 times   • Knee surgery     • Endoscopy N/A 6/30/2016     Procedure: ESOPHAGOGASTRODUODENOSCOPY WITH BIOPSY  CPTCODE:49479;  Surgeon: Jose Antonio Belle III, MD;   Location:  [facility] OR;  Service:    • Colonoscopy N/A 6/30/2016     Procedure: COLONOSCOPY  CPTCODE:08803;  Surgeon: Jose Antonio Belle III, MD;  Location:  [facility] OR;  Service:    • Colonoscopy N/A 7/7/2016     Procedure: COLONOSCOPY (16964) CPT;  Surgeon: Jose Antonio Belle III, MD;  [facility];  Service:    • Hemorrhoidectomy N/A 7/28/2016     Procedure: HEMORRHOID STAPLING;  Surgeon: Kael Lopez MD;  Location: [facility] OR;  Service:    • Anal scope N/A 7/28/2016     Procedure: ANAL SCOPE;  Surgeon: Kael Lopez MD;  Location:  [facility] OR;  Service:        Family History   Problem Relation Age of Onset   • Crohn's disease Other    • Hypertension Other    • Diabetes Other    • Irritable bowel syndrome Other        Social History     Social History   • Marital status:      Spouse name: N/A   • Number of children: N/A   • Years of education: N/A     Social History Main Topics   • Smoking status: Former Smoker     Types: Cigarettes     Quit date: 11/1/2015   • Smokeless tobacco: Never Used   • Alcohol use No   • Drug use: Yes     Special: Marijuana   • Sexual activity: Defer     Other Topics Concern   • None     Social History Narrative           Objective   Physical Exam   Constitutional: She is oriented to person, place, and time. She appears well-developed and well-nourished. No distress.   HENT:   Head: Normocephalic and atraumatic.   Nose: Nose normal.   Eyes: Conjunctivae and EOM are normal. Pupils are equal, round, and reactive to light.   Neck: Normal range of motion. Neck supple. No JVD present. No tracheal deviation present.   Cardiovascular: Normal rate, regular rhythm and normal heart sounds.    No murmur heard.  Pulmonary/Chest: Effort normal and breath sounds normal. No respiratory distress. She has no wheezes.   Abdominal: Soft. Bowel sounds are normal. There is tenderness (RuQ / diffuse. ).   Musculoskeletal: Normal range of motion. She exhibits no edema or deformity.   Neurological:  She is alert and oriented to person, place, and time. No cranial nerve deficit.   Skin: Skin is warm and dry. No rash noted. She is not diaphoretic. No erythema. No pallor.   Psychiatric: She has a normal mood and affect. Her behavior is normal. Thought content normal.   Nursing note and vitals reviewed.      Procedures         ED Course  ED Course   Comment By Time   CT Abd Pelvis [facility]:  Mild thickening of the sigmoid colon suggesting colitis.  TAMI Weir 03/15 0041   CT Head [facility]:  1.5 cm partially calcified right frontal lobe dural based mass suggesting a meningioma.  TAMI Weir 03/15 0041                  MDM  Number of Diagnoses or Management Options  Chronic intractable headache, unspecified headache type: established and improving  Colitis: new and does not require workup     Amount and/or Complexity of Data Reviewed  Clinical lab tests: ordered and reviewed  Tests in the radiology section of CPT®: ordered and reviewed    Risk of Complications, Morbidity, and/or Mortality  Presenting problems: moderate  Diagnostic procedures: moderate  Management options: low    Patient Progress  Patient progress: stable      Final diagnoses:   Colitis   Chronic intractable headache, unspecified headache type            TAMI Weir  03/15/17 0228

## 2017-03-15 NOTE — ED NOTES
"Patient states \" i was seen by my doctor yesterday and she said i had a left ear infection and sinus infection, but madhavi got a really bad headache and my neck hurts\". Provider aware.      Fe Perales RN  03/14/17 3720    "

## 2017-03-19 ENCOUNTER — HOSPITAL ENCOUNTER (EMERGENCY)
Facility: HOSPITAL | Age: 42
Discharge: HOME | End: 2017-03-19
Admitting: EMERGENCY MEDICINE

## 2017-03-19 VITALS
HEIGHT: 69 IN | HEART RATE: 80 BPM | DIASTOLIC BLOOD PRESSURE: 72 MMHG | OXYGEN SATURATION: 98 % | BODY MASS INDEX: 23.11 KG/M2 | WEIGHT: 156 LBS | RESPIRATION RATE: 16 BRPM | SYSTOLIC BLOOD PRESSURE: 120 MMHG | TEMPERATURE: 97.9 F

## 2017-03-19 DIAGNOSIS — J01.90 ACUTE SINUSITIS, RECURRENCE NOT SPECIFIED, UNSPECIFIED LOCATION: Primary | ICD-10-CM

## 2017-03-19 LAB
ALBUMIN SERPL-MCNC: 4.2 G/DL (ref 3.5–5)
ALBUMIN/GLOB SERPL: 1.4 G/DL (ref 1.5–2.5)
ALP SERPL-CCNC: 59 U/L (ref 35–104)
ALT SERPL W P-5'-P-CCNC: 23 U/L (ref 10–36)
AMPHET+METHAMPHET UR QL: NEGATIVE
ANION GAP SERPL CALCULATED.3IONS-SCNC: 5.8 MMOL/L (ref 3.6–11.2)
AST SERPL-CCNC: 45 U/L (ref 10–30)
BARBITURATES UR QL SCN: POSITIVE
BASOPHILS # BLD AUTO: 0.02 10*3/MM3 (ref 0–0.3)
BASOPHILS NFR BLD AUTO: 0.3 % (ref 0–2)
BENZODIAZ UR QL SCN: NEGATIVE
BILIRUB SERPL-MCNC: 0.3 MG/DL (ref 0.2–1.8)
BILIRUB UR QL STRIP: NEGATIVE
BUN BLD-MCNC: 11 MG/DL (ref 7–21)
BUN/CREAT SERPL: 15.1 (ref 7–25)
CALCIUM SPEC-SCNC: 9.4 MG/DL (ref 7.7–10)
CANNABINOIDS SERPL QL: POSITIVE
CHLORIDE SERPL-SCNC: 108 MMOL/L (ref 99–112)
CLARITY UR: CLEAR
CO2 SERPL-SCNC: 25.2 MMOL/L (ref 24.3–31.9)
COCAINE UR QL: NEGATIVE
COLOR UR: YELLOW
CREAT BLD-MCNC: 0.73 MG/DL (ref 0.43–1.29)
DEPRECATED RDW RBC AUTO: 43 FL (ref 37–54)
EOSINOPHIL # BLD AUTO: 0.05 10*3/MM3 (ref 0–0.7)
EOSINOPHIL NFR BLD AUTO: 0.8 % (ref 0–5)
ERYTHROCYTE [DISTWIDTH] IN BLOOD BY AUTOMATED COUNT: 13.1 % (ref 11.5–14.5)
GFR SERPL CREATININE-BSD FRML MDRD: 87 ML/MIN/1.73
GLOBULIN UR ELPH-MCNC: 2.9 GM/DL
GLUCOSE BLD-MCNC: 126 MG/DL (ref 70–110)
GLUCOSE UR STRIP-MCNC: NEGATIVE MG/DL
HCT VFR BLD AUTO: 40.7 % (ref 37–47)
HGB BLD-MCNC: 13.4 G/DL (ref 12–16)
HGB UR QL STRIP.AUTO: NEGATIVE
IMM GRANULOCYTES # BLD: 0.01 10*3/MM3 (ref 0–0.03)
IMM GRANULOCYTES NFR BLD: 0.2 % (ref 0–0.5)
KETONES UR QL STRIP: NEGATIVE
LEUKOCYTE ESTERASE UR QL STRIP.AUTO: NEGATIVE
LYMPHOCYTES # BLD AUTO: 2.56 10*3/MM3 (ref 1–3)
LYMPHOCYTES NFR BLD AUTO: 38.8 % (ref 21–51)
MCH RBC QN AUTO: 30.9 PG (ref 27–33)
MCHC RBC AUTO-ENTMCNC: 32.9 G/DL (ref 33–37)
MCV RBC AUTO: 93.8 FL (ref 80–94)
METHADONE UR QL SCN: NEGATIVE
MONOCYTES # BLD AUTO: 0.44 10*3/MM3 (ref 0.1–0.9)
MONOCYTES NFR BLD AUTO: 6.7 % (ref 0–10)
NEUTROPHILS # BLD AUTO: 3.52 10*3/MM3 (ref 1.4–6.5)
NEUTROPHILS NFR BLD AUTO: 53.2 % (ref 30–70)
NITRITE UR QL STRIP: NEGATIVE
OPIATES UR QL: POSITIVE
OSMOLALITY SERPL CALC.SUM OF ELEC: 278.5 MOSM/KG (ref 273–305)
OXYCODONE UR QL SCN: NEGATIVE
PCP UR QL SCN: NEGATIVE
PH UR STRIP.AUTO: <=5 [PH] (ref 5–8)
PLATELET # BLD AUTO: 134 10*3/MM3 (ref 130–400)
PMV BLD AUTO: 10.6 FL (ref 6–10)
POTASSIUM BLD-SCNC: 3.5 MMOL/L (ref 3.5–5.3)
PROPOXYPH UR QL: NEGATIVE
PROT SERPL-MCNC: 7.1 G/DL (ref 6–8)
PROT UR QL STRIP: NEGATIVE
RBC # BLD AUTO: 4.34 10*6/MM3 (ref 4.2–5.4)
SODIUM BLD-SCNC: 139 MMOL/L (ref 135–153)
SP GR UR STRIP: 1.02 (ref 1–1.03)
UROBILINOGEN UR QL STRIP: NORMAL
WBC NRBC COR # BLD: 6.6 10*3/MM3 (ref 4.5–12.5)

## 2017-03-19 PROCEDURE — 96375 TX/PRO/DX INJ NEW DRUG ADDON: CPT

## 2017-03-19 PROCEDURE — 25010000002 MORPHINE PER 10 MG: Performed by: NURSE PRACTITIONER

## 2017-03-19 PROCEDURE — 80307 DRUG TEST PRSMV CHEM ANLYZR: CPT | Performed by: NURSE PRACTITIONER

## 2017-03-19 PROCEDURE — 25010000002 DEXAMETHASONE PER 1 MG: Performed by: NURSE PRACTITIONER

## 2017-03-19 PROCEDURE — 85025 COMPLETE CBC W/AUTO DIFF WBC: CPT | Performed by: NURSE PRACTITIONER

## 2017-03-19 PROCEDURE — 25010000002 CEFTRIAXONE: Performed by: NURSE PRACTITIONER

## 2017-03-19 PROCEDURE — 96365 THER/PROPH/DIAG IV INF INIT: CPT

## 2017-03-19 PROCEDURE — 99283 EMERGENCY DEPT VISIT LOW MDM: CPT

## 2017-03-19 PROCEDURE — 81003 URINALYSIS AUTO W/O SCOPE: CPT | Performed by: NURSE PRACTITIONER

## 2017-03-19 PROCEDURE — 80053 COMPREHEN METABOLIC PANEL: CPT | Performed by: NURSE PRACTITIONER

## 2017-03-19 PROCEDURE — 96367 TX/PROPH/DG ADDL SEQ IV INF: CPT

## 2017-03-19 PROCEDURE — 25010000002 PROMETHAZINE PER 50 MG: Performed by: NURSE PRACTITIONER

## 2017-03-19 RX ORDER — AMOXICILLIN AND CLAVULANATE POTASSIUM 875; 125 MG/1; MG/1
1 TABLET, FILM COATED ORAL 2 TIMES DAILY
Qty: 20 TABLET | Refills: 0 | Status: SHIPPED | OUTPATIENT
Start: 2017-03-19 | End: 2017-04-27

## 2017-03-19 RX ORDER — SODIUM CHLORIDE 0.9 % (FLUSH) 0.9 %
10 SYRINGE (ML) INJECTION AS NEEDED
Status: DISCONTINUED | OUTPATIENT
Start: 2017-03-19 | End: 2017-03-19 | Stop reason: HOSPADM

## 2017-03-19 RX ORDER — PROMETHAZINE HYDROCHLORIDE 25 MG/ML
12.5 INJECTION, SOLUTION INTRAMUSCULAR; INTRAVENOUS ONCE
Status: COMPLETED | OUTPATIENT
Start: 2017-03-19 | End: 2017-03-19

## 2017-03-19 RX ADMIN — CEFTRIAXONE 1 G: 1 INJECTION, POWDER, FOR SOLUTION INTRAMUSCULAR; INTRAVENOUS at 20:10

## 2017-03-19 RX ADMIN — PROMETHAZINE HYDROCHLORIDE 12.5 MG: 25 INJECTION, SOLUTION INTRAMUSCULAR; INTRAVENOUS at 21:00

## 2017-03-19 RX ADMIN — SODIUM CHLORIDE 1000 ML: 9 INJECTION, SOLUTION INTRAVENOUS at 20:10

## 2017-03-19 RX ADMIN — DEXAMETHASONE SODIUM PHOSPHATE 10 MG: 4 INJECTION, SOLUTION INTRAMUSCULAR; INTRAVENOUS at 20:41

## 2017-03-19 RX ADMIN — MORPHINE SULFATE 4 MG: 4 INJECTION, SOLUTION INTRAMUSCULAR; INTRAVENOUS at 21:28

## 2017-03-19 NOTE — ED PROVIDER NOTES
Subjective   Patient is a 42 y.o. female presenting with URI.   History provided by:  Patient   used: No    URI   Presenting symptoms: congestion, cough, facial pain, fever and sore throat    Sore throat:     Severity:  Moderate    Onset quality:  Sudden    Duration:  1 week    Timing:  Constant    Progression:  Worsening  Severity:  Moderate  Onset quality:  Sudden  Duration:  1 week  Timing:  Constant  Progression:  Worsening  Chronicity:  New  Relieved by:  Nothing  Worsened by:  Nothing  Ineffective treatments:  Prescription medications  Associated symptoms: headaches and sinus pain    Associated symptoms: no arthralgias, no myalgias, no swollen glands and no wheezing    Risk factors: not elderly, no chronic cardiac disease, no chronic kidney disease, no chronic respiratory disease, no immunosuppression, no recent illness, no recent travel and no sick contacts        Review of Systems   Constitutional: Positive for fever.   HENT: Positive for congestion and sore throat.    Eyes: Negative.    Respiratory: Positive for cough. Negative for wheezing.    Cardiovascular: Negative.    Endocrine: Negative.    Genitourinary: Negative.    Musculoskeletal: Negative.  Negative for arthralgias and myalgias.   Skin: Negative.    Allergic/Immunologic: Negative.    Neurological: Positive for headaches.   Hematological: Negative.    Psychiatric/Behavioral: Negative.        Past Medical History   Diagnosis Date   • Abdominal pain    • Abdominal swelling    • Bipolar 1 disorder    • Brain tumor      R Frontal Lobe per pt   • Constipation    • Diarrhea    • Fibromyalgia    • IBS (irritable bowel syndrome)    • Migraine    • Nausea & vomiting    • PONV (postoperative nausea and vomiting)    • PTSD (post-traumatic stress disorder)    • Rectal bleeding        Allergies   Allergen Reactions   • Ativan [Lorazepam] Hallucinations     confusion   • Sulfa Antibiotics Shortness Of Breath and Swelling   • Compazine  [Prochlorperazine Edisylate] Hives   • Demerol [Meperidine] Hives   • Droperidol    • Prochlorperazine    • Toradol [Ketorolac Tromethamine] Hives and Itching       Past Surgical History   Procedure Laterality Date   • Hysterectomy     • Appendectomy     • Shoulder surgery       3 times   • Knee surgery     • Endoscopy N/A 6/30/2016     Procedure: ESOPHAGOGASTRODUODENOSCOPY WITH BIOPSY  CPTCODE:78721;  Surgeon: Jose Antonio Belle III, MD;  Location:  [facility] OR;  Service:    • Colonoscopy N/A 6/30/2016     Procedure: COLONOSCOPY  CPTCODE:68701;  Surgeon: Jose Antonio Belle III, MD;  Location:  [facility] OR;  Service:    • Colonoscopy N/A 7/7/2016     Procedure: COLONOSCOPY (99100) CPT;  Surgeon: Jose Antonio Belle III, MD;  Location: [facility] OR;  Service:    • Hemorrhoidectomy N/A 7/28/2016     Procedure: HEMORRHOID STAPLING;  Surgeon: Kael Lopez MD;  Location: [facility] OR;  Service:    • Anal scope N/A 7/28/2016     Procedure: ANAL SCOPE;  Surgeon: Kael Lopez MD;  Location: [facility] OR;  Service:        Family History   Problem Relation Age of Onset   • Crohn's disease Other    • Hypertension Other    • Diabetes Other    • Irritable bowel syndrome Other        Social History     Social History   • Marital status:      Spouse name: N/A   • Number of children: N/A   • Years of education: N/A     Social History Main Topics   • Smoking status: Former Smoker     Types: Cigarettes     Quit date: 11/1/2015   • Smokeless tobacco: Never Used   • Alcohol use No   • Drug use: Yes     Special: Marijuana   • Sexual activity: Defer     Other Topics Concern   • None     Social History Narrative           Objective   Physical Exam   Constitutional: She is oriented to person, place, and time. She appears well-developed and well-nourished.   HENT:   Head: Normocephalic.   Right Ear: External ear normal.   Left Ear: External ear normal.   Mouth/Throat: Oropharynx is clear and moist.   Sinus tenderness to palpation in frontal  and maxillary sinuses.  Inflamed erythematous turbinates.   Eyes: EOM are normal. Pupils are equal, round, and reactive to light.   Neck: Normal range of motion. Neck supple.   Cardiovascular: Normal rate and regular rhythm.    Pulmonary/Chest: Effort normal and breath sounds normal.   Abdominal: Soft. Bowel sounds are normal.   Musculoskeletal: Normal range of motion.   Neurological: She is alert and oriented to person, place, and time.   Skin: Skin is warm.   Psychiatric: She has a normal mood and affect. Her behavior is normal.   Nursing note and vitals reviewed.      Procedures         ED Course  ED Course                  MDM    Final diagnoses:   Acute sinusitis, recurrence not specified, unspecified location            Ja Suarez, APRN  03/19/17 3779

## 2017-03-20 ENCOUNTER — HOSPITAL ENCOUNTER (EMERGENCY)
Facility: HOSPITAL | Age: 42
Discharge: HOME | End: 2017-03-21
Attending: EMERGENCY MEDICINE | Admitting: EMERGENCY MEDICINE

## 2017-03-20 DIAGNOSIS — G54.2 CERVICAL NERVE ROOT IMPINGEMENT: Primary | ICD-10-CM

## 2017-03-20 PROCEDURE — 99283 EMERGENCY DEPT VISIT LOW MDM: CPT

## 2017-03-20 PROCEDURE — 96365 THER/PROPH/DIAG IV INF INIT: CPT

## 2017-03-20 PROCEDURE — 25010000002 PROMETHAZINE PER 50 MG: Performed by: PHYSICIAN ASSISTANT

## 2017-03-20 PROCEDURE — 96375 TX/PRO/DX INJ NEW DRUG ADDON: CPT

## 2017-03-20 PROCEDURE — 25010000002 BUTORPHANOL PER 1 MG: Performed by: PHYSICIAN ASSISTANT

## 2017-03-20 RX ORDER — SODIUM CHLORIDE 0.9 % (FLUSH) 0.9 %
10 SYRINGE (ML) INJECTION AS NEEDED
Status: DISCONTINUED | OUTPATIENT
Start: 2017-03-20 | End: 2017-03-21 | Stop reason: HOSPADM

## 2017-03-20 RX ADMIN — SODIUM CHLORIDE 1000 ML: 9 INJECTION, SOLUTION INTRAVENOUS at 23:55

## 2017-03-20 RX ADMIN — BUTORPHANOL TARTRATE 2 MG: 2 INJECTION, SOLUTION INTRAMUSCULAR; INTRAVENOUS at 23:56

## 2017-03-20 RX ADMIN — PROMETHAZINE HYDROCHLORIDE 25 MG: 25 INJECTION, SOLUTION INTRAMUSCULAR; INTRAVENOUS at 23:56

## 2017-03-21 ENCOUNTER — OFFICE VISIT (OUTPATIENT)
Dept: SURGERY | Facility: CLINIC | Age: 42
End: 2017-03-21

## 2017-03-21 VITALS
BODY MASS INDEX: 23.11 KG/M2 | DIASTOLIC BLOOD PRESSURE: 86 MMHG | HEART RATE: 83 BPM | WEIGHT: 156 LBS | SYSTOLIC BLOOD PRESSURE: 112 MMHG | TEMPERATURE: 98.3 F | HEIGHT: 69 IN | OXYGEN SATURATION: 98 % | RESPIRATION RATE: 18 BRPM

## 2017-03-21 VITALS
BODY MASS INDEX: 22.81 KG/M2 | SYSTOLIC BLOOD PRESSURE: 118 MMHG | HEIGHT: 69 IN | DIASTOLIC BLOOD PRESSURE: 80 MMHG | WEIGHT: 154 LBS | HEART RATE: 84 BPM

## 2017-03-21 DIAGNOSIS — R16.1 SPLENOMEGALY: Primary | ICD-10-CM

## 2017-03-21 PROCEDURE — 96372 THER/PROPH/DIAG INJ SC/IM: CPT

## 2017-03-21 PROCEDURE — 25010000002 ORPHENADRINE CITRATE PER 60 MG: Performed by: PHYSICIAN ASSISTANT

## 2017-03-21 PROCEDURE — 99213 OFFICE O/P EST LOW 20 MIN: CPT | Performed by: SURGERY

## 2017-03-21 RX ORDER — CYCLOBENZAPRINE HCL 10 MG
10 TABLET ORAL 2 TIMES DAILY PRN
Qty: 20 TABLET | Refills: 0 | Status: ON HOLD | OUTPATIENT
Start: 2017-03-21 | End: 2017-07-28

## 2017-03-21 RX ORDER — ORPHENADRINE CITRATE 30 MG/ML
60 INJECTION INTRAMUSCULAR; INTRAVENOUS ONCE
Status: COMPLETED | OUTPATIENT
Start: 2017-03-21 | End: 2017-03-21

## 2017-03-21 RX ADMIN — ORPHENADRINE CITRATE 60 MG: 30 INJECTION INTRAMUSCULAR; INTRAVENOUS at 01:11

## 2017-03-21 NOTE — ED NOTES
Pt is resting quietly. Meds just administered. Pt has eyes closed. Vss. Will continue to monitor.     Kristin Montiel RN  03/21/17 0009

## 2017-03-21 NOTE — ED PROVIDER NOTES
"Subjective   HPI Comments: 43 yo WF, presented to ER w/ C/O of muscle spasm in neck.  Pt admitted to being seen by neurologist around noon on 3/20 and received \"nerve block\"  Pt admitted to multiple injections placed in occipital area neck.  Pt stated that she is unable to turn her head, and suffering from NV secondary to procedure.  Pt admitted that she contacted neurologist (Dr. Phna) and was told sx should resolve, however over course of time sx have worsened.  Pt has long hx of chronic migraine.       Review of Systems   Constitutional: Negative.  Negative for fever.   HENT: Negative.    Respiratory: Negative.    Cardiovascular: Negative.  Negative for chest pain.   Gastrointestinal: Negative.  Negative for abdominal pain.   Endocrine: Negative.    Genitourinary: Negative.  Negative for dysuria.   Skin: Negative.    Neurological: Negative.    Psychiatric/Behavioral: Negative.    All other systems reviewed and are negative.      Past Medical History   Diagnosis Date   • Abdominal pain    • Abdominal swelling    • Bipolar 1 disorder    • Brain tumor      R Frontal Lobe per pt   • Constipation    • Diarrhea    • Fibromyalgia    • IBS (irritable bowel syndrome)    • Migraine    • Nausea & vomiting    • PONV (postoperative nausea and vomiting)    • PTSD (post-traumatic stress disorder)    • Rectal bleeding        Allergies   Allergen Reactions   • Ativan [Lorazepam] Hallucinations     confusion   • Sulfa Antibiotics Shortness Of Breath and Swelling   • Compazine [Prochlorperazine Edisylate] Hives   • Demerol [Meperidine] Hives   • Droperidol    • Prochlorperazine    • Toradol [Ketorolac Tromethamine] Hives and Itching       Past Surgical History   Procedure Laterality Date   • Hysterectomy     • Appendectomy     • Shoulder surgery       3 times   • Knee surgery     • Endoscopy N/A 6/30/2016     Procedure: ESOPHAGOGASTRODUODENOSCOPY WITH BIOPSY  CPTCODE:18995;  Surgeon: Jose Antonio Belle III, MD;  [facility];  Service:    • Colonoscopy N/A 6/30/2016     Procedure: COLONOSCOPY  CPTCODE:54474;  Surgeon: Jose Antonio Belle III, MD;  Location:  [facility];  Service:    • Colonoscopy N/A 7/7/2016     Procedure: COLONOSCOPY (29832) CPT;  Surgeon: Jose Antonio Belle III, MD;  Location:  [facility];  Service:    • Hemorrhoidectomy N/A 7/28/2016     Procedure: HEMORRHOID STAPLING;  Surgeon: Kael Lopez MD;  Location:  [facility] OR;  Service:    • Anal scope N/A 7/28/2016     Procedure: ANAL SCOPE;  Surgeon: Kael Lopez MD;  Location:  [facility];  Service:        Family History   Problem Relation Age of Onset   • Crohn's disease Other    • Hypertension Other    • Diabetes Other    • Irritable bowel syndrome Other        Social History     Social History   • Marital status:      Spouse name: N/A   • Number of children: N/A   • Years of education: N/A     Social History Main Topics   • Smoking status: Former Smoker     Types: Cigarettes     Quit date: 11/1/2015   • Smokeless tobacco: Never Used   • Alcohol use No   • Drug use: Yes     Special: Marijuana   • Sexual activity: Defer     Other Topics Concern   • None     Social History Narrative           Objective   Physical Exam   Constitutional: She is oriented to person, place, and time. She appears well-developed and well-nourished. No distress.   HENT:   Head: Normocephalic and atraumatic.   Nose: Nose normal.   Eyes: Conjunctivae and EOM are normal. Pupils are equal, round, and reactive to light.   Neck: Neck supple. No JVD present. No tracheal deviation present.   Very little ROM of c spine.  Tightness noted in muscles.    Cardiovascular: Normal rate, regular rhythm and normal heart sounds.    No murmur heard.  Pulmonary/Chest: Effort normal and breath sounds normal. No respiratory distress. She has no wheezes.   Abdominal: Soft. Bowel sounds are normal. There is no tenderness.   Musculoskeletal: Normal range of motion. She exhibits no edema or deformity.    Neurological: She is alert and oriented to person, place, and time. No cranial nerve deficit.   Skin: Skin is warm and dry. No rash noted. She is not diaphoretic. No erythema. No pallor.   Psychiatric: She has a normal mood and affect. Her behavior is normal. Thought content normal.   Nursing note and vitals reviewed.      Procedures         ED Course  ED Course                  MDM  Number of Diagnoses or Management Options  Cervical nerve root impingement: established and improving  Risk of Complications, Morbidity, and/or Mortality  Presenting problems: low  Diagnostic procedures: low  Management options: low    Patient Progress  Patient progress: improved      Final diagnoses:   Cervical nerve root impingement            TAMI Weir  03/21/17 0121

## 2017-03-21 NOTE — PROGRESS NOTES
Hui Camilo is a 42 y.o. female is being seen for consultation today at the request of No ref. provider found    HPI Comments:  42-year-old female with frequent ER visits who presents with incidental finding of splenomegaly on CT the abdomen and pelvis.  No pain in LUQ.  No CBC abnormality concerning for splenic disorder.  Fatty liver present.  No family history of lymphoma.  No obstructive symptoms.       Past Medical History   Diagnosis Date   • Abdominal pain    • Abdominal swelling    • Bipolar 1 disorder    • Brain tumor      R Frontal Lobe per pt   • Constipation    • Diarrhea    • Fibromyalgia    • IBS (irritable bowel syndrome)    • Migraine    • Nausea & vomiting    • PONV (postoperative nausea and vomiting)    • PTSD (post-traumatic stress disorder)    • Rectal bleeding        Family History   Problem Relation Age of Onset   • Crohn's disease Other    • Hypertension Other    • Diabetes Other    • Irritable bowel syndrome Other        Social History     Social History   • Marital status:      Spouse name: N/A   • Number of children: N/A   • Years of education: N/A     Occupational History   • Not on file.     Social History Main Topics   • Smoking status: Former Smoker     Types: Cigarettes     Quit date: 11/1/2015   • Smokeless tobacco: Never Used   • Alcohol use No   • Drug use: Yes     Special: Marijuana   • Sexual activity: Defer     Other Topics Concern   • Not on file     Social History Narrative       Past Surgical History   Procedure Laterality Date   • Hysterectomy     • Appendectomy     • Shoulder surgery       3 times   • Knee surgery     • Endoscopy N/A 6/30/2016     Procedure: ESOPHAGOGASTRODUODENOSCOPY WITH BIOPSY  CPTCODE:71543;  Surgeon: Jose Antonio Belle III, MD;  Location: [facility];  Service:    • Colonoscopy N/A 6/30/2016     Procedure: COLONOSCOPY  CPTCODE:54951;  Surgeon: Jose Antonio Belle III, MD;  Location: [facility] OR;  Service:    • Colonoscopy N/A 7/7/2016     " Procedure: COLONOSCOPY (90796) CPT;  Surgeon: Jose Antonio Belle III, MD;  Location: [facility] OR;  Service:    • Hemorrhoidectomy N/A 7/28/2016     Procedure: HEMORRHOID STAPLING;  Surgeon: Kael Lopez MD;  Location: [facility] OR;  Service:    • Anal scope N/A 7/28/2016     Procedure: ANAL SCOPE;  Surgeon: Kael Lopez MD;  Location: [facility] OR;  Service:        The following portions of the patient's history were reviewed and updated as appropriate: allergies, current medications, past family history, past medical history, past social history, past surgical history and problem list.    Review of Systems   Constitutional: Negative for activity change, appetite change, chills and fever.   HENT: Negative for sore throat and trouble swallowing.    Eyes: Negative for visual disturbance.   Respiratory: Negative for cough and shortness of breath.    Cardiovascular: Negative for chest pain and palpitations.   Gastrointestinal: Positive for abdominal pain. Negative for abdominal distention, blood in stool, constipation, diarrhea, nausea and vomiting.   Endocrine: Negative for cold intolerance and heat intolerance.   Genitourinary: Negative for dysuria.   Musculoskeletal: Negative for joint swelling.   Skin: Negative for color change, rash and wound.   Allergic/Immunologic: Negative for immunocompromised state.   Neurological: Negative for dizziness, seizures, weakness and headaches.   Hematological: Negative for adenopathy. Does not bruise/bleed easily.   Psychiatric/Behavioral: Negative for agitation and confusion.         Visit Vitals   • /80   • Pulse 84   • Ht 69\" (175.3 cm)   • Wt 154 lb (69.9 kg)   • LMP  (LMP Unknown)   • BMI 22.74 kg/m2     Objective   Physical Exam   Constitutional: She is oriented to person, place, and time. She appears well-developed.   HENT:   Head: Normocephalic and atraumatic.   Mouth/Throat: Mucous membranes are normal.   Eyes: Conjunctivae are normal. Pupils are equal, round, and " reactive to light.   Neck: Neck supple. No JVD present. No tracheal deviation present. No thyromegaly present.   Cardiovascular: Normal rate and regular rhythm.  Exam reveals no gallop and no friction rub.    No murmur heard.  Pulmonary/Chest: Effort normal and breath sounds normal.   Abdominal: Soft. She exhibits no distension. There is no splenomegaly or hepatomegaly. There is no tenderness. No hernia.   Musculoskeletal: Normal range of motion. She exhibits no deformity.   Neurological: She is alert and oriented to person, place, and time.   Skin: Skin is warm and dry.   Psychiatric: She has a normal mood and affect.         Susanna was seen today for left side pain.    Diagnoses and all orders for this visit:    Splenomegaly        Assessment     41 yo F with incidental finding of splenomegaly during CT scan for abdominal pain.  Mild splenomegaly in the setting of fatty liver.  CBC without abnormalities concerning for primary splenic disease.  Follow up PRN.

## 2017-03-21 NOTE — ED NOTES
Pt reports having shots in her neck by Dr Mcleod. Having headache/nausea/vomiting since then.     Kristin Montiel RN  03/21/17 0005

## 2017-03-21 NOTE — ED NOTES
Rates headache at 3. Resting with lights dimmed in room. Spouse at bedside.     Kristin Montiel RN  03/21/17 0101

## 2017-03-25 PROCEDURE — 99283 EMERGENCY DEPT VISIT LOW MDM: CPT

## 2017-03-26 ENCOUNTER — HOSPITAL ENCOUNTER (EMERGENCY)
Facility: HOSPITAL | Age: 42
Discharge: HOME | End: 2017-03-26
Attending: FAMILY MEDICINE | Admitting: FAMILY MEDICINE

## 2017-03-26 VITALS
OXYGEN SATURATION: 98 % | TEMPERATURE: 98 F | HEIGHT: 69 IN | BODY MASS INDEX: 22.07 KG/M2 | HEART RATE: 91 BPM | WEIGHT: 149 LBS | RESPIRATION RATE: 16 BRPM | DIASTOLIC BLOOD PRESSURE: 83 MMHG | SYSTOLIC BLOOD PRESSURE: 125 MMHG

## 2017-03-26 DIAGNOSIS — G43.509 PERSISTENT MIGRAINE AURA WITHOUT CEREBRAL INFARCTION AND WITHOUT STATUS MIGRAINOSUS, NOT INTRACTABLE: Primary | ICD-10-CM

## 2017-03-26 PROCEDURE — 96361 HYDRATE IV INFUSION ADD-ON: CPT

## 2017-03-26 PROCEDURE — 96365 THER/PROPH/DIAG IV INF INIT: CPT

## 2017-03-26 PROCEDURE — 25010000002 BUTORPHANOL PER 1 MG: Performed by: FAMILY MEDICINE

## 2017-03-26 PROCEDURE — 96368 THER/DIAG CONCURRENT INF: CPT

## 2017-03-26 PROCEDURE — 96376 TX/PRO/DX INJ SAME DRUG ADON: CPT

## 2017-03-26 PROCEDURE — 96375 TX/PRO/DX INJ NEW DRUG ADDON: CPT

## 2017-03-26 PROCEDURE — 25010000002 DEXAMETHASONE PER 1 MG: Performed by: FAMILY MEDICINE

## 2017-03-26 PROCEDURE — 25010000002 PROMETHAZINE PER 50 MG: Performed by: FAMILY MEDICINE

## 2017-03-26 RX ORDER — ONDANSETRON 2 MG/ML
4 INJECTION INTRAMUSCULAR; INTRAVENOUS ONCE
Status: DISCONTINUED | OUTPATIENT
Start: 2017-03-26 | End: 2017-03-26

## 2017-03-26 RX ORDER — SODIUM CHLORIDE 0.9 % (FLUSH) 0.9 %
10 SYRINGE (ML) INJECTION AS NEEDED
Status: DISCONTINUED | OUTPATIENT
Start: 2017-03-26 | End: 2017-03-26 | Stop reason: HOSPADM

## 2017-03-26 RX ADMIN — BUTORPHANOL TARTRATE 2 MG: 2 INJECTION, SOLUTION INTRAMUSCULAR; INTRAVENOUS at 01:39

## 2017-03-26 RX ADMIN — DEXAMETHASONE SODIUM PHOSPHATE 10 MG: 4 INJECTION, SOLUTION INTRAMUSCULAR; INTRAVENOUS at 01:39

## 2017-03-26 RX ADMIN — SODIUM CHLORIDE 1000 ML: 9 INJECTION, SOLUTION INTRAVENOUS at 01:38

## 2017-03-26 RX ADMIN — BUTORPHANOL TARTRATE 2 MG: 2 INJECTION, SOLUTION INTRAMUSCULAR; INTRAVENOUS at 03:13

## 2017-03-26 RX ADMIN — PROMETHAZINE HYDROCHLORIDE 12.5 MG: 25 INJECTION INTRAMUSCULAR; INTRAVENOUS at 01:39

## 2017-03-26 NOTE — ED PROVIDER NOTES
Subjective   Patient is a 42 y.o. female presenting with migraines.   History provided by:  Patient  Migraine   Pain location:  Generalized  Quality:  Sharp  Radiates to:  Eyes  Severity currently:  10/10  Severity at highest:  10/10  Onset quality:  Gradual  Timing:  Intermittent  Progression:  Waxing and waning  Chronicity:  Chronic  Similar to prior headaches: yes    Context: bright light, emotional stress and straining    Context: not activity    Relieved by:  Nothing  Worsened by:  Activity, light, neck movement and sound  Ineffective treatments:  Acetaminophen, cold packs, NSAIDs, resting in a darkened room and prescription medications (is scheduled to start DHE tomorrow when the pharmacy gets it  prescribed by kathy Cintron)  Associated symptoms: no abdominal pain, no back pain, no congestion, no cough, no diarrhea, no dizziness, no eye pain, no fatigue, no myalgias, no nausea, no neck pain, no neck stiffness, no vomiting and no weakness        Review of Systems   Constitutional: Negative for activity change, appetite change, chills and fatigue.   HENT: Negative for congestion.    Eyes: Negative for pain.   Respiratory: Negative for cough, shortness of breath, wheezing and stridor.    Cardiovascular: Negative for chest pain.   Gastrointestinal: Negative for abdominal pain, diarrhea, nausea and vomiting.   Genitourinary: Negative for dysuria.   Musculoskeletal: Negative for arthralgias, back pain, myalgias, neck pain and neck stiffness.   Skin: Negative for rash.   Neurological: Negative for dizziness, syncope, speech difficulty, weakness and headaches.   Psychiatric/Behavioral: Negative for agitation.       Past Medical History:   Diagnosis Date   • Abdominal pain    • Abdominal swelling    • Bipolar 1 disorder    • Brain tumor     R Frontal Lobe per pt   • Constipation    • Diarrhea    • Fibromyalgia    • IBS (irritable bowel syndrome)    • Migraine    • Nausea & vomiting    • PONV (postoperative nausea and  vomiting)    • PTSD (post-traumatic stress disorder)    • Rectal bleeding        Allergies   Allergen Reactions   • Ativan [Lorazepam] Hallucinations     confusion   • Sulfa Antibiotics Shortness Of Breath and Swelling   • Compazine [Prochlorperazine Edisylate] Hives   • Demerol [Meperidine] Hives   • Droperidol    • Prochlorperazine    • Toradol [Ketorolac Tromethamine] Hives and Itching       Past Surgical History:   Procedure Laterality Date   • ANAL SCOPE N/A 7/28/2016    Procedure: ANAL SCOPE;  Surgeon: Kael Lopez MD;  Location: [facility] OR;  Service:    • APPENDECTOMY     • COLONOSCOPY N/A 6/30/2016    Procedure: COLONOSCOPY  CPTCODE:14414;  Surgeon: Jose Antonio Belle III, MD;  Location: [facility] OR;  Service:    • COLONOSCOPY N/A 7/7/2016    Procedure: COLONOSCOPY (27679) CPT;  Surgeon: Jose Antonio Belle III, MD;  Location: [facility] OR;  Service:    • ENDOSCOPY N/A 6/30/2016    Procedure: ESOPHAGOGASTRODUODENOSCOPY WITH BIOPSY  CPTCODE:77989;  Surgeon: Jose Antonio Belle III, MD;  Location: [facility] OR;  Service:    • HEMORRHOIDECTOMY N/A 7/28/2016    Procedure: HEMORRHOID STAPLING;  Surgeon: Kael Lopez MD;  Location: [facility] OR;  Service:    • HYSTERECTOMY     • KNEE SURGERY     • SHOULDER SURGERY      3 times       Family History   Problem Relation Age of Onset   • Crohn's disease Other    • Hypertension Other    • Diabetes Other    • Irritable bowel syndrome Other        Social History     Social History   • Marital status:      Spouse name: N/A   • Number of children: N/A   • Years of education: N/A     Social History Main Topics   • Smoking status: Former Smoker     Types: Cigarettes     Quit date: 11/1/2015   • Smokeless tobacco: Never Used   • Alcohol use No   • Drug use: Yes     Special: Marijuana   • Sexual activity: Defer     Other Topics Concern   • None     Social History Narrative           Objective   Physical Exam   Constitutional: She is oriented to person, place, and time. She  appears well-developed and well-nourished.   HENT:   Head: Normocephalic.   Right Ear: External ear normal.   Left Ear: External ear normal.   Eyes: EOM are normal.   Neck: Neck supple.   Cardiovascular: Normal rate and regular rhythm.    Pulmonary/Chest: Effort normal and breath sounds normal.   Abdominal: Soft. Bowel sounds are normal.   Musculoskeletal: Normal range of motion.   Neurological: She is alert and oriented to person, place, and time.   Skin: Skin is warm.   Psychiatric: She has a normal mood and affect. Her behavior is normal. Judgment and thought content normal.   Nursing note and vitals reviewed.      Procedures         ED Course  ED Course                  MDM  Number of Diagnoses or Management Options  Persistent migraine aura without cerebral infarction and without status migrainosus, not intractable: established and worsening     Amount and/or Complexity of Data Reviewed  Clinical lab tests: ordered and reviewed  Tests in the radiology section of CPT®: ordered and reviewed  Tests in the medicine section of CPT®: reviewed and ordered  Independent visualization of images, tracings, or specimens: yes    Risk of Complications, Morbidity, and/or Mortality  Presenting problems: moderate  Diagnostic procedures: low  Management options: low    Patient Progress  Patient progress: improved      Final diagnoses:   Persistent migraine aura without cerebral infarction and without status migrainosus, not intractable            Rowena Fuller DO  03/26/17 0376

## 2017-04-05 ENCOUNTER — OFFICE VISIT (OUTPATIENT)
Dept: UROLOGY | Facility: CLINIC | Age: 42
End: 2017-04-05

## 2017-04-05 DIAGNOSIS — R31.0 GROSS HEMATURIA: ICD-10-CM

## 2017-04-05 DIAGNOSIS — R31.29 MICROSCOPIC HEMATURIA: Primary | ICD-10-CM

## 2017-04-05 LAB — TESTOST SERPL-MCNC: 14.01 NG/DL

## 2017-04-05 PROCEDURE — 84403 ASSAY OF TOTAL TESTOSTERONE: CPT | Performed by: UROLOGY

## 2017-04-05 PROCEDURE — 99213 OFFICE O/P EST LOW 20 MIN: CPT | Performed by: UROLOGY

## 2017-04-05 NOTE — PROGRESS NOTES
Chief Complaint:          Chief Complaint   Patient presents with   • Blood in Urine     Follow up after diagnostic cystoscopy for gross hematuria and recurrent UTI.       HPI:   42 y.o. female.    42-year-old white female returns today.  She had an unremarkable ultrasound.  She had a cystoscopy on 117 which was negative.  She was started on oxybutynin.  Her repeat culture on 3/12/17 was negative.  She had a CT scan that showed a tiny cyst with normal upper tracts.  She now has several occasions of pinkish urine.  Its associated with ongoing pain.  I discussed the possibility of loin pain hematuria syndrome or IgA nephropathy.  She's not had a nephrologic workup for this.  I'm going to recommend a cystoscopy retrograde she also complains of some difficulty urinating.  Her  complains of low libido and her on her part and a very dry vagina secondary to multiple medications we will check a testosterone and a cytology as well      Past Medical History:        Past Medical History:   Diagnosis Date   • Abdominal pain    • Abdominal swelling    • Bipolar 1 disorder    • Brain tumor     R Frontal Lobe per pt   • Constipation    • Diarrhea    • Fibromyalgia    • IBS (irritable bowel syndrome)    • Migraine    • Nausea & vomiting    • PONV (postoperative nausea and vomiting)    • PTSD (post-traumatic stress disorder)    • Rectal bleeding          Current Meds:     Current Outpatient Prescriptions   Medication Sig Dispense Refill   • amoxicillin-clavulanate (AUGMENTIN) 875-125 MG per tablet Take 1 tablet by mouth 2 (Two) Times a Day. 20 tablet 0   • amoxicillin-clavulanate (AUGMENTIN) 875-125 MG per tablet Take 1 tablet by mouth 2 (Two) Times a Day. 20 tablet 0   • azelastine (ASTEPRO) 0.15 % solution nasal spray 2 sprays into each nostril 2 (Two) Times a Day. 30 mL 0   • busPIRone (BUSPAR) 15 MG tablet Take 15 mg by mouth 3 (three) times a day        • butalbital-acetaminophen-caffeine (FIORICET) -40 MG per  tablet Take 1-2 tablets by mouth Every 6 (Six) Hours As Needed for headaches or migraine. 20 tablet 0   • carbidopa-levodopa (SINEMET)  MG per tablet Take 1 tablet by mouth 3 (Three) Times a Day.     • cyclobenzaprine (FLEXERIL) 10 MG tablet Take 1 tablet by mouth 2 (Two) Times a Day As Needed for Muscle Spasms. 20 tablet 0   • diflunisal (DOLOBID) 500 MG tablet tablet Take 1 tablet by mouth 2 (Two) Times a Day. 20 tablet 0   • divalproex (DEPAKOTE) 500 MG DR tablet Take 500 mg by mouth 2 (Two) Times a Day.     • famotidine (PEPCID) 40 MG tablet Take 40 mg by mouth 2 (Two) Times a Day As Needed for heartburn.     • fesoterodine fumarate (TOVIAZ) 8 MG tablet sustained-release 24 hour capsule Take 1 tablet by mouth Daily. 14 tablet 0   • HYDROcodone-acetaminophen (NORCO) 5-325 MG per tablet Take 1 tablet by mouth Every 6 (Six) Hours As Needed for moderate pain (4-6). 12 tablet 0   • meclizine (ANTIVERT) 25 MG tablet Take 1 tablet by mouth Daily. 30 tablet 0   • MethylPREDNISolone (MEDROL, BRET,) 4 MG tablet Take as directed on package instructions. 21 tablet 0   • metroNIDAZOLE (FLAGYL) 500 MG tablet Take 1 tablet by mouth 2 (Two) Times a Day. 20 tablet 0   • nitrofurantoin, macrocrystal-monohydrate, (MACROBID) 100 MG capsule Take 1 capsule by mouth 2 (Two) Times a Day. 14 capsule 0   • ondansetron ODT (ZOFRAN-ODT) 4 MG disintegrating tablet Take 1 tablet by mouth 4 (Four) Times a Day. 15 tablet 0   • ondansetron ODT (ZOFRAN-ODT) 4 MG disintegrating tablet Take 1 tablet by mouth Every 6 (Six) Hours As Needed for nausea or vomiting. 15 tablet 0   • pantoprazole (PROTONIX) 40 MG EC tablet Take 40 mg by mouth Daily As Needed.     • phenazopyridine (PYRIDIUM) 200 MG tablet Take 1 tablet by mouth 3 (Three) Times a Day As Needed for bladder spasms. 12 tablet 0   • promethazine (PHENERGAN) 25 MG tablet Take 1 tablet by mouth Every 6 (Six) Hours As Needed for Nausea or Vomiting. 12 tablet 0   • sertraline (ZOLOFT) 100 MG  tablet Take 150 mg by mouth daily.     • topiramate (TOPAMAX) 25 MG capsule Take 25 mg by mouth 2 (Two) Times a Day.       No current facility-administered medications for this visit.         Allergies:      Allergies   Allergen Reactions   • Ativan [Lorazepam] Hallucinations     confusion   • Sulfa Antibiotics Shortness Of Breath and Swelling   • Compazine [Prochlorperazine Edisylate] Hives   • Demerol [Meperidine] Hives   • Droperidol    • Prochlorperazine    • Toradol [Ketorolac Tromethamine] Hives and Itching        Past Surgical History:     Past Surgical History:   Procedure Laterality Date   • ANAL SCOPE N/A 7/28/2016    Procedure: ANAL SCOPE;  Surgeon: Kael Lopez MD;  Location: [facility] OR;  Service:    • APPENDECTOMY     • COLONOSCOPY N/A 6/30/2016    Procedure: COLONOSCOPY  CPTCODE:03367;  Surgeon: Jose Antonio Belle III, MD;  Location: [facility] OR;  Service:    • COLONOSCOPY N/A 7/7/2016    Procedure: COLONOSCOPY (66264) CPT;  Surgeon: Jose Antonio Belle III, MD;  Location: [facility] OR;  Service:    • ENDOSCOPY N/A 6/30/2016    Procedure: ESOPHAGOGASTRODUODENOSCOPY WITH BIOPSY  CPTCODE:51562;  Surgeon: Jose Antonio Belle III, MD;  Location: [facility] OR;  Service:    • HEMORRHOIDECTOMY N/A 7/28/2016    Procedure: HEMORRHOID STAPLING;  Surgeon: Kael Lopez MD;  Location: [facility] OR;  Service:    • HYSTERECTOMY     • KNEE SURGERY     • SHOULDER SURGERY      3 times         Social History:     Social History     Social History   • Marital status:      Spouse name: N/A   • Number of children: N/A   • Years of education: N/A     Occupational History   • Not on file.     Social History Main Topics   • Smoking status: Former Smoker     Types: Cigarettes     Quit date: 11/1/2015   • Smokeless tobacco: Never Used   • Alcohol use No   • Drug use: Yes     Special: Marijuana   • Sexual activity: Defer     Other Topics Concern   • Not on file     Social History Narrative       Family History:     Family  History   Problem Relation Age of Onset   • Crohn's disease Other    • Hypertension Other    • Diabetes Other    • Irritable bowel syndrome Other        Review of Systems:     Review of Systems   Constitutional: Negative.  Negative for activity change, appetite change, chills, diaphoresis, fatigue and unexpected weight change.   HENT: Negative for congestion, dental problem, drooling, ear discharge, ear pain, facial swelling, hearing loss, mouth sores, nosebleeds, postnasal drip, rhinorrhea, sinus pressure, sneezing, sore throat, tinnitus, trouble swallowing and voice change.    Eyes: Negative.  Negative for photophobia, pain, discharge, redness, itching and visual disturbance.   Respiratory: Negative.  Negative for apnea, cough, choking, chest tightness, shortness of breath, wheezing and stridor.    Cardiovascular: Negative.  Negative for chest pain, palpitations and leg swelling.   Gastrointestinal: Negative.  Negative for abdominal distention, abdominal pain, anal bleeding, blood in stool, constipation, diarrhea, nausea, rectal pain and vomiting.   Endocrine: Negative.  Negative for cold intolerance, heat intolerance, polydipsia, polyphagia and polyuria.   Genitourinary: Negative.  Negative for difficulty urinating, dyspareunia, dysuria and genital sores.   Musculoskeletal: Negative.  Negative for arthralgias, back pain, gait problem, joint swelling, myalgias, neck pain and neck stiffness.   Skin: Negative.  Negative for color change, pallor, rash and wound.   Allergic/Immunologic: Negative.  Negative for environmental allergies, food allergies and immunocompromised state.   Neurological: Negative.  Negative for dizziness, tremors, seizures, syncope, facial asymmetry, speech difficulty, weakness, light-headedness, numbness and headaches.   Hematological: Negative.  Negative for adenopathy. Does not bruise/bleed easily.   Psychiatric/Behavioral: Negative for agitation, behavioral problems, confusion, decreased  concentration, dysphoric mood, hallucinations, self-injury, sleep disturbance and suicidal ideas. The patient is not nervous/anxious and is not hyperactive.    All other systems reviewed and are negative.      Physical Exam:     Physical Exam   Constitutional: She appears well-developed and well-nourished.   HENT:   Head: Normocephalic and atraumatic.   Right Ear: External ear normal.   Left Ear: External ear normal.   Mouth/Throat: Oropharynx is clear and moist.   Eyes: Conjunctivae are normal. Pupils are equal, round, and reactive to light.   Cardiovascular: Normal rate, regular rhythm, normal heart sounds and intact distal pulses.    Pulmonary/Chest: Effort normal and breath sounds normal.   Abdominal: Soft. Bowel sounds are normal. She exhibits no distension and no mass. There is no tenderness. There is no rebound and no guarding.   Genitourinary: No vaginal discharge found.   Musculoskeletal: Normal range of motion.   Neurological: She is alert. She has normal reflexes.   Skin: Skin is warm and dry.   Psychiatric: She has a normal mood and affect. Her behavior is normal. Judgment and thought content normal.       Procedure:       Assessment:   No diagnosis found.  No orders of the defined types were placed in this encounter.      Plan:   Material likely IgA nephropathy I went into a cystoscopy retrograde to complete the urologic workup and probably make a referral to nephrology for more aggressive treatment in this regard           This document has been electronically signed by VERENICE FINK MD April 5, 2017 1:05 PM

## 2017-04-06 ENCOUNTER — HOSPITAL ENCOUNTER (EMERGENCY)
Facility: HOSPITAL | Age: 42
Discharge: HOME | End: 2017-04-06
Admitting: EMERGENCY MEDICINE

## 2017-04-06 VITALS
HEART RATE: 88 BPM | BODY MASS INDEX: 23.11 KG/M2 | DIASTOLIC BLOOD PRESSURE: 78 MMHG | WEIGHT: 156 LBS | RESPIRATION RATE: 18 BRPM | SYSTOLIC BLOOD PRESSURE: 128 MMHG | HEIGHT: 69 IN | OXYGEN SATURATION: 97 % | TEMPERATURE: 98 F

## 2017-04-06 DIAGNOSIS — N39.0 ACUTE UTI: Primary | ICD-10-CM

## 2017-04-06 LAB
ALBUMIN SERPL-MCNC: 4.7 G/DL (ref 3.5–5)
ALBUMIN/GLOB SERPL: 1.6 G/DL (ref 1.5–2.5)
ALP SERPL-CCNC: 57 U/L (ref 35–104)
ALT SERPL W P-5'-P-CCNC: 68 U/L (ref 10–36)
AMYLASE SERPL-CCNC: 78 U/L (ref 28–100)
ANION GAP SERPL CALCULATED.3IONS-SCNC: 5.9 MMOL/L (ref 3.6–11.2)
AST SERPL-CCNC: 65 U/L (ref 10–30)
BACTERIA UR QL AUTO: ABNORMAL /HPF
BASOPHILS # BLD AUTO: 0.01 10*3/MM3 (ref 0–0.3)
BASOPHILS NFR BLD AUTO: 0.2 % (ref 0–2)
BILIRUB SERPL-MCNC: 0.4 MG/DL (ref 0.2–1.8)
BILIRUB UR QL STRIP: NEGATIVE
BUN BLD-MCNC: 8 MG/DL (ref 7–21)
BUN/CREAT SERPL: 12.3 (ref 7–25)
CALCIUM SPEC-SCNC: 9.6 MG/DL (ref 7.7–10)
CHLORIDE SERPL-SCNC: 106 MMOL/L (ref 99–112)
CLARITY UR: CLEAR
CO2 SERPL-SCNC: 27.1 MMOL/L (ref 24.3–31.9)
COLOR UR: ABNORMAL
CREAT BLD-MCNC: 0.65 MG/DL (ref 0.43–1.29)
DEPRECATED RDW RBC AUTO: 41.9 FL (ref 37–54)
EOSINOPHIL # BLD AUTO: 0.13 10*3/MM3 (ref 0–0.7)
EOSINOPHIL NFR BLD AUTO: 2.2 % (ref 0–5)
ERYTHROCYTE [DISTWIDTH] IN BLOOD BY AUTOMATED COUNT: 12.7 % (ref 11.5–14.5)
GFR SERPL CREATININE-BSD FRML MDRD: 100 ML/MIN/1.73
GLOBULIN UR ELPH-MCNC: 2.9 GM/DL
GLUCOSE BLD-MCNC: 99 MG/DL (ref 70–110)
GLUCOSE UR STRIP-MCNC: NEGATIVE MG/DL
HCT VFR BLD AUTO: 42.4 % (ref 37–47)
HGB BLD-MCNC: 14.3 G/DL (ref 12–16)
HGB UR QL STRIP.AUTO: ABNORMAL
HYALINE CASTS UR QL AUTO: ABNORMAL /LPF
IMM GRANULOCYTES # BLD: 0.03 10*3/MM3 (ref 0–0.03)
IMM GRANULOCYTES NFR BLD: 0.5 % (ref 0–0.5)
KETONES UR QL STRIP: NEGATIVE
LEUKOCYTE ESTERASE UR QL STRIP.AUTO: ABNORMAL
LIPASE SERPL-CCNC: 62 U/L (ref 13–60)
LYMPHOCYTES # BLD AUTO: 2.12 10*3/MM3 (ref 1–3)
LYMPHOCYTES NFR BLD AUTO: 35.6 % (ref 21–51)
MCH RBC QN AUTO: 31.3 PG (ref 27–33)
MCHC RBC AUTO-ENTMCNC: 33.7 G/DL (ref 33–37)
MCV RBC AUTO: 92.8 FL (ref 80–94)
MONOCYTES # BLD AUTO: 0.64 10*3/MM3 (ref 0.1–0.9)
MONOCYTES NFR BLD AUTO: 10.7 % (ref 0–10)
NEUTROPHILS # BLD AUTO: 3.03 10*3/MM3 (ref 1.4–6.5)
NEUTROPHILS NFR BLD AUTO: 50.8 % (ref 30–70)
NITRITE UR QL STRIP: NEGATIVE
OSMOLALITY SERPL CALC.SUM OF ELEC: 275.9 MOSM/KG (ref 273–305)
PH UR STRIP.AUTO: 6.5 [PH] (ref 5–8)
PLATELET # BLD AUTO: 138 10*3/MM3 (ref 130–400)
PMV BLD AUTO: 11 FL (ref 6–10)
POTASSIUM BLD-SCNC: 4.9 MMOL/L (ref 3.5–5.3)
PROT SERPL-MCNC: 7.6 G/DL (ref 6–8)
PROT UR QL STRIP: ABNORMAL
RBC # BLD AUTO: 4.57 10*6/MM3 (ref 4.2–5.4)
RBC # UR: ABNORMAL /HPF
REF LAB TEST METHOD: ABNORMAL
SODIUM BLD-SCNC: 139 MMOL/L (ref 135–153)
SP GR UR STRIP: 1.01 (ref 1–1.03)
SQUAMOUS #/AREA URNS HPF: ABNORMAL /HPF
UROBILINOGEN UR QL STRIP: ABNORMAL
WBC NRBC COR # BLD: 5.96 10*3/MM3 (ref 4.5–12.5)
WBC UR QL AUTO: ABNORMAL /HPF

## 2017-04-06 PROCEDURE — 36415 COLL VENOUS BLD VENIPUNCTURE: CPT

## 2017-04-06 PROCEDURE — 83690 ASSAY OF LIPASE: CPT | Performed by: NURSE PRACTITIONER

## 2017-04-06 PROCEDURE — 81001 URINALYSIS AUTO W/SCOPE: CPT | Performed by: NURSE PRACTITIONER

## 2017-04-06 PROCEDURE — 25010000002 PROMETHAZINE PER 50 MG: Performed by: NURSE PRACTITIONER

## 2017-04-06 PROCEDURE — 87086 URINE CULTURE/COLONY COUNT: CPT | Performed by: NURSE PRACTITIONER

## 2017-04-06 PROCEDURE — 80053 COMPREHEN METABOLIC PANEL: CPT | Performed by: NURSE PRACTITIONER

## 2017-04-06 PROCEDURE — 25010000002 HYDROMORPHONE PER 4 MG: Performed by: NURSE PRACTITIONER

## 2017-04-06 PROCEDURE — 25010000002 BUTORPHANOL PER 1 MG: Performed by: NURSE PRACTITIONER

## 2017-04-06 PROCEDURE — 96375 TX/PRO/DX INJ NEW DRUG ADDON: CPT

## 2017-04-06 PROCEDURE — 96361 HYDRATE IV INFUSION ADD-ON: CPT

## 2017-04-06 PROCEDURE — 96365 THER/PROPH/DIAG IV INF INIT: CPT

## 2017-04-06 PROCEDURE — 99284 EMERGENCY DEPT VISIT MOD MDM: CPT

## 2017-04-06 PROCEDURE — 82150 ASSAY OF AMYLASE: CPT | Performed by: NURSE PRACTITIONER

## 2017-04-06 PROCEDURE — 85025 COMPLETE CBC W/AUTO DIFF WBC: CPT | Performed by: NURSE PRACTITIONER

## 2017-04-06 RX ORDER — PROMETHAZINE HYDROCHLORIDE 25 MG/ML
12.5 INJECTION, SOLUTION INTRAMUSCULAR; INTRAVENOUS ONCE
Status: DISCONTINUED | OUTPATIENT
Start: 2017-04-06 | End: 2017-04-06

## 2017-04-06 RX ORDER — SODIUM CHLORIDE 0.9 % (FLUSH) 0.9 %
10 SYRINGE (ML) INJECTION AS NEEDED
Status: DISCONTINUED | OUTPATIENT
Start: 2017-04-06 | End: 2017-04-06 | Stop reason: HOSPADM

## 2017-04-06 RX ORDER — HYDROMORPHONE HYDROCHLORIDE 1 MG/ML
0.5 INJECTION, SOLUTION INTRAMUSCULAR; INTRAVENOUS; SUBCUTANEOUS ONCE
Status: COMPLETED | OUTPATIENT
Start: 2017-04-06 | End: 2017-04-06

## 2017-04-06 RX ORDER — BUTORPHANOL TARTRATE 1 MG/ML
1 INJECTION, SOLUTION INTRAMUSCULAR; INTRAVENOUS ONCE
Status: COMPLETED | OUTPATIENT
Start: 2017-04-06 | End: 2017-04-06

## 2017-04-06 RX ORDER — NITROFURANTOIN 25; 75 MG/1; MG/1
100 CAPSULE ORAL 2 TIMES DAILY
Qty: 14 CAPSULE | Refills: 0 | Status: SHIPPED | OUTPATIENT
Start: 2017-04-06 | End: 2017-04-13

## 2017-04-06 RX ADMIN — PROMETHAZINE HYDROCHLORIDE 12.5 MG: 25 INJECTION INTRAMUSCULAR; INTRAVENOUS at 15:06

## 2017-04-06 RX ADMIN — SODIUM CHLORIDE 1000 ML: 9 INJECTION, SOLUTION INTRAVENOUS at 15:05

## 2017-04-06 RX ADMIN — BUTORPHANOL TARTRATE 1 MG: 1 INJECTION, SOLUTION INTRAMUSCULAR; INTRAVENOUS at 15:06

## 2017-04-06 RX ADMIN — HYDROMORPHONE HYDROCHLORIDE 0.5 MG: 1 INJECTION, SOLUTION INTRAMUSCULAR; INTRAVENOUS; SUBCUTANEOUS at 16:45

## 2017-04-06 NOTE — ED NOTES
Pt requests something to drink. PA wishes for her to wait. Pt notified and verbalizes understanding.     Kristin Montiel RN  04/06/17 7603

## 2017-04-06 NOTE — ED PROVIDER NOTES
Subjective   Patient is a 42 y.o. female presenting with flank pain.   History provided by:  Patient   used: No    Flank Pain   Pain location:  R flank  Pain quality: sharp    Pain radiates to:  Does not radiate  Pain severity:  Moderate  Onset quality:  Sudden  Duration:  4 days  Timing:  Constant  Progression:  Waxing and waning  Chronicity:  New  Context: not alcohol use, not awakening from sleep, not diet changes, not medication withdrawal, not previous surgeries, not recent illness, not retching, not sick contacts and not suspicious food intake    Relieved by:  Nothing  Worsened by:  Nothing  Ineffective treatments:  None tried  Associated symptoms: dysuria, hematuria and nausea    Associated symptoms: no anorexia, no belching, no chest pain, no chills, no constipation, no cough, no fatigue, no fever, no flatus, no hematemesis, no hematochezia, no melena, no shortness of breath, no sore throat and no vaginal bleeding    Risk factors: not elderly, has not had multiple surgeries, no NSAID use and not obese        Review of Systems   Constitutional: Negative.  Negative for chills, fatigue and fever.   HENT: Negative.  Negative for sore throat.    Eyes: Negative.    Respiratory: Negative.  Negative for cough and shortness of breath.    Cardiovascular: Negative.  Negative for chest pain.   Gastrointestinal: Positive for nausea. Negative for anorexia, constipation, flatus, hematemesis, hematochezia and melena.   Endocrine: Negative.    Genitourinary: Positive for dysuria, flank pain and hematuria. Negative for vaginal bleeding.   Skin: Negative.    Allergic/Immunologic: Negative.    Neurological: Negative.    Hematological: Negative.    Psychiatric/Behavioral: Negative.        Past Medical History:   Diagnosis Date   • Abdominal pain    • Abdominal swelling    • Bipolar 1 disorder    • Brain tumor     R Frontal Lobe per pt   • Constipation    • Diarrhea    • Fibromyalgia    • IBS (irritable bowel  syndrome)    • Migraine    • Nausea & vomiting    • PONV (postoperative nausea and vomiting)    • PTSD (post-traumatic stress disorder)    • Rectal bleeding        Allergies   Allergen Reactions   • Ativan [Lorazepam] Hallucinations     confusion   • Sulfa Antibiotics Shortness Of Breath and Swelling   • Compazine [Prochlorperazine Edisylate] Hives   • Demerol [Meperidine] Hives   • Droperidol    • Prochlorperazine    • Toradol [Ketorolac Tromethamine] Hives and Itching       Past Surgical History:   Procedure Laterality Date   • ANAL SCOPE N/A 7/28/2016    Procedure: ANAL SCOPE;  Surgeon: Kael Lopez MD;  Location: [facility] OR;  Service:    • APPENDECTOMY     • COLONOSCOPY N/A 6/30/2016    Procedure: COLONOSCOPY  CPTCODE:00877;  Surgeon: Jose Antonio Belle III, MD;  Location: [facility] OR;  Service:    • COLONOSCOPY N/A 7/7/2016    Procedure: COLONOSCOPY (67070) CPT;  Surgeon: Jose Antonio Belle III, MD;  Location: [facility] OR;  Service:    • ENDOSCOPY N/A 6/30/2016    Procedure: ESOPHAGOGASTRODUODENOSCOPY WITH BIOPSY  CPTCODE:11284;  Surgeon: Joes Antonio Belle III, MD;  Location: [facility] OR;  Service:    • HEMORRHOIDECTOMY N/A 7/28/2016    Procedure: HEMORRHOID STAPLING;  Surgeon: Kael Lopez MD;  Location: [facility] OR;  Service:    • HYSTERECTOMY     • KNEE SURGERY     • SHOULDER SURGERY      3 times       Family History   Problem Relation Age of Onset   • Crohn's disease Other    • Hypertension Other    • Diabetes Other    • Irritable bowel syndrome Other        Social History     Social History   • Marital status:      Spouse name: N/A   • Number of children: N/A   • Years of education: N/A     Social History Main Topics   • Smoking status: Former Smoker     Types: Cigarettes     Quit date: 11/1/2015   • Smokeless tobacco: Never Used   • Alcohol use No   • Drug use: Yes     Special: Marijuana   • Sexual activity: Defer     Other Topics Concern   • None     Social History Narrative           Objective    Physical Exam   Constitutional: She is oriented to person, place, and time. She appears well-developed and well-nourished.   HENT:   Head: Normocephalic.   Right Ear: External ear normal.   Left Ear: External ear normal.   Mouth/Throat: Oropharynx is clear and moist.   Eyes: Conjunctivae and EOM are normal. Pupils are equal, round, and reactive to light.   Neck: Normal range of motion. Neck supple.   Cardiovascular: Normal rate and regular rhythm.    Pulmonary/Chest: Effort normal and breath sounds normal.   Abdominal: Soft. Bowel sounds are normal. There is tenderness (Right CVA; mild RLQ).   Musculoskeletal: Normal range of motion.   Neurological: She is alert and oriented to person, place, and time.   Skin: Skin is warm and dry.   Psychiatric: She has a normal mood and affect. Her behavior is normal.   Nursing note and vitals reviewed.      Procedures         ED Course  ED Course                  MDM    Final diagnoses:   Acute UTI            MIGDALIA Fuentes  04/06/17 1632       MIGDALIA Fuentes  04/06/17 7721

## 2017-04-06 NOTE — ED NOTES
Unable to obtain IV access, lead nurse to attempt. Patient updated regarding wait times, will continue to monitor.      Cici Huynh RN  04/06/17 1165

## 2017-04-08 LAB — BACTERIA SPEC AEROBE CULT: NORMAL

## 2017-04-09 ENCOUNTER — HOSPITAL ENCOUNTER (EMERGENCY)
Facility: HOSPITAL | Age: 42
Discharge: HOME | End: 2017-04-09
Admitting: EMERGENCY MEDICINE

## 2017-04-09 VITALS
TEMPERATURE: 98.6 F | HEART RATE: 87 BPM | BODY MASS INDEX: 23.11 KG/M2 | DIASTOLIC BLOOD PRESSURE: 88 MMHG | HEIGHT: 69 IN | WEIGHT: 156 LBS | OXYGEN SATURATION: 98 % | SYSTOLIC BLOOD PRESSURE: 150 MMHG | RESPIRATION RATE: 16 BRPM

## 2017-04-09 DIAGNOSIS — R30.0 DYSURIA: Primary | ICD-10-CM

## 2017-04-09 PROCEDURE — 99282 EMERGENCY DEPT VISIT SF MDM: CPT

## 2017-04-09 NOTE — ED NOTES
PT AND SPOUSE REFUSING CATH US SPECIMEN, REQUESTING D/C AT THIS TIME. DANA ALICEA NP AWARE.      Raeann Shell RN  04/09/17 1921
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
## 2017-04-13 ENCOUNTER — TELEPHONE (OUTPATIENT)
Dept: UROLOGY | Facility: CLINIC | Age: 42
End: 2017-04-13

## 2017-04-13 NOTE — TELEPHONE ENCOUNTER
Patient called and I went over scheduled surgery and preop with date,time and location.I went over instructions regarding surgery and preop with patient. Patient verbalized understanding.

## 2017-04-16 ENCOUNTER — HOSPITAL ENCOUNTER (EMERGENCY)
Facility: HOSPITAL | Age: 42
Discharge: HOME | End: 2017-04-16
Attending: EMERGENCY MEDICINE | Admitting: EMERGENCY MEDICINE

## 2017-04-16 VITALS
TEMPERATURE: 97.7 F | RESPIRATION RATE: 16 BRPM | HEIGHT: 69 IN | DIASTOLIC BLOOD PRESSURE: 89 MMHG | OXYGEN SATURATION: 99 % | HEART RATE: 79 BPM | WEIGHT: 163 LBS | SYSTOLIC BLOOD PRESSURE: 126 MMHG | BODY MASS INDEX: 24.14 KG/M2

## 2017-04-16 DIAGNOSIS — G43.009 MIGRAINE WITHOUT AURA AND WITHOUT STATUS MIGRAINOSUS, NOT INTRACTABLE: Primary | ICD-10-CM

## 2017-04-16 LAB
ALBUMIN SERPL-MCNC: 4.4 G/DL (ref 3.5–5)
ALBUMIN/GLOB SERPL: 1.4 G/DL (ref 1.5–2.5)
ALP SERPL-CCNC: 51 U/L (ref 35–104)
ALT SERPL W P-5'-P-CCNC: 19 U/L (ref 10–36)
ANION GAP SERPL CALCULATED.3IONS-SCNC: 4.6 MMOL/L (ref 3.6–11.2)
AST SERPL-CCNC: 94 U/L (ref 10–30)
BACTERIA UR QL AUTO: ABNORMAL /HPF
BASOPHILS # BLD AUTO: 0.01 10*3/MM3 (ref 0–0.3)
BASOPHILS NFR BLD AUTO: 0.2 % (ref 0–2)
BILIRUB SERPL-MCNC: 0.7 MG/DL (ref 0.2–1.8)
BILIRUB UR QL STRIP: NEGATIVE
BUN BLD-MCNC: 8 MG/DL (ref 7–21)
BUN/CREAT SERPL: 14 (ref 7–25)
CALCIUM SPEC-SCNC: 9.3 MG/DL (ref 7.7–10)
CHLORIDE SERPL-SCNC: 105 MMOL/L (ref 99–112)
CLARITY UR: CLEAR
CO2 SERPL-SCNC: 27.4 MMOL/L (ref 24.3–31.9)
COLOR UR: YELLOW
CREAT BLD-MCNC: 0.57 MG/DL (ref 0.43–1.29)
DEPRECATED RDW RBC AUTO: 43.5 FL (ref 37–54)
EOSINOPHIL # BLD AUTO: 0.09 10*3/MM3 (ref 0–0.7)
EOSINOPHIL NFR BLD AUTO: 1.8 % (ref 0–5)
ERYTHROCYTE [DISTWIDTH] IN BLOOD BY AUTOMATED COUNT: 13.3 % (ref 11.5–14.5)
GFR SERPL CREATININE-BSD FRML MDRD: 116 ML/MIN/1.73
GLOBULIN UR ELPH-MCNC: 3.1 GM/DL
GLUCOSE BLD-MCNC: 94 MG/DL (ref 70–110)
GLUCOSE UR STRIP-MCNC: NEGATIVE MG/DL
HCT VFR BLD AUTO: 41.7 % (ref 37–47)
HGB BLD-MCNC: 13.6 G/DL (ref 12–16)
HGB UR QL STRIP.AUTO: NEGATIVE
HYALINE CASTS UR QL AUTO: ABNORMAL /LPF
IMM GRANULOCYTES # BLD: 0.01 10*3/MM3 (ref 0–0.03)
IMM GRANULOCYTES NFR BLD: 0.2 % (ref 0–0.5)
KETONES UR QL STRIP: NEGATIVE
LEUKOCYTE ESTERASE UR QL STRIP.AUTO: ABNORMAL
LYMPHOCYTES # BLD AUTO: 1.77 10*3/MM3 (ref 1–3)
LYMPHOCYTES NFR BLD AUTO: 35.6 % (ref 21–51)
MCH RBC QN AUTO: 30.7 PG (ref 27–33)
MCHC RBC AUTO-ENTMCNC: 32.6 G/DL (ref 33–37)
MCV RBC AUTO: 94.1 FL (ref 80–94)
MONOCYTES # BLD AUTO: 0.5 10*3/MM3 (ref 0.1–0.9)
MONOCYTES NFR BLD AUTO: 10.1 % (ref 0–10)
NEUTROPHILS # BLD AUTO: 2.59 10*3/MM3 (ref 1.4–6.5)
NEUTROPHILS NFR BLD AUTO: 52.1 % (ref 30–70)
NITRITE UR QL STRIP: NEGATIVE
OSMOLALITY SERPL CALC.SUM OF ELEC: 271.9 MOSM/KG (ref 273–305)
PH UR STRIP.AUTO: 6 [PH] (ref 5–8)
PLATELET # BLD AUTO: 112 10*3/MM3 (ref 130–400)
PMV BLD AUTO: 10.5 FL (ref 6–10)
POTASSIUM BLD-SCNC: 5.2 MMOL/L (ref 3.5–5.3)
PROT SERPL-MCNC: 7.5 G/DL (ref 6–8)
PROT UR QL STRIP: NEGATIVE
RBC # BLD AUTO: 4.43 10*6/MM3 (ref 4.2–5.4)
RBC # UR: ABNORMAL /HPF
REF LAB TEST METHOD: ABNORMAL
SODIUM BLD-SCNC: 137 MMOL/L (ref 135–153)
SP GR UR STRIP: 1.01 (ref 1–1.03)
SQUAMOUS #/AREA URNS HPF: ABNORMAL /HPF
UROBILINOGEN UR QL STRIP: ABNORMAL
WBC NRBC COR # BLD: 4.97 10*3/MM3 (ref 4.5–12.5)
WBC UR QL AUTO: ABNORMAL /HPF

## 2017-04-16 PROCEDURE — 99284 EMERGENCY DEPT VISIT MOD MDM: CPT

## 2017-04-16 PROCEDURE — 25010000002 DIPHENHYDRAMINE PER 50 MG: Performed by: PHYSICIAN ASSISTANT

## 2017-04-16 PROCEDURE — 81001 URINALYSIS AUTO W/SCOPE: CPT | Performed by: PHYSICIAN ASSISTANT

## 2017-04-16 PROCEDURE — 25010000002 BUTORPHANOL PER 1 MG: Performed by: PHYSICIAN ASSISTANT

## 2017-04-16 PROCEDURE — 96375 TX/PRO/DX INJ NEW DRUG ADDON: CPT

## 2017-04-16 PROCEDURE — 87086 URINE CULTURE/COLONY COUNT: CPT | Performed by: PHYSICIAN ASSISTANT

## 2017-04-16 PROCEDURE — 96376 TX/PRO/DX INJ SAME DRUG ADON: CPT

## 2017-04-16 PROCEDURE — 96365 THER/PROPH/DIAG IV INF INIT: CPT

## 2017-04-16 PROCEDURE — 96361 HYDRATE IV INFUSION ADD-ON: CPT

## 2017-04-16 PROCEDURE — 25010000002 DEXAMETHASONE PER 1 MG: Performed by: PHYSICIAN ASSISTANT

## 2017-04-16 PROCEDURE — 85025 COMPLETE CBC W/AUTO DIFF WBC: CPT | Performed by: PHYSICIAN ASSISTANT

## 2017-04-16 PROCEDURE — 80053 COMPREHEN METABOLIC PANEL: CPT | Performed by: PHYSICIAN ASSISTANT

## 2017-04-16 PROCEDURE — 25010000002 ONDANSETRON PER 1 MG

## 2017-04-16 RX ORDER — PROMETHAZINE HCL 50 MG
25 TABLET ORAL EVERY 6 HOURS PRN
Qty: 20 TABLET | Refills: 0 | Status: SHIPPED | OUTPATIENT
Start: 2017-04-16 | End: 2017-04-27 | Stop reason: SDUPTHER

## 2017-04-16 RX ORDER — PROMETHAZINE HYDROCHLORIDE 25 MG/ML
25 INJECTION, SOLUTION INTRAMUSCULAR; INTRAVENOUS ONCE
Status: DISCONTINUED | OUTPATIENT
Start: 2017-04-16 | End: 2017-04-16

## 2017-04-16 RX ORDER — PROMETHAZINE HYDROCHLORIDE 25 MG/1
25 TABLET ORAL ONCE
Status: DISCONTINUED | OUTPATIENT
Start: 2017-04-16 | End: 2017-04-16

## 2017-04-16 RX ORDER — SUMATRIPTAN 6 MG/.5ML
6 INJECTION, SOLUTION SUBCUTANEOUS ONCE
COMMUNITY

## 2017-04-16 RX ORDER — DIPHENHYDRAMINE HYDROCHLORIDE 50 MG/ML
25 INJECTION INTRAMUSCULAR; INTRAVENOUS ONCE
Status: DISCONTINUED | OUTPATIENT
Start: 2017-04-16 | End: 2017-04-16

## 2017-04-16 RX ORDER — ONDANSETRON 2 MG/ML
INJECTION INTRAMUSCULAR; INTRAVENOUS
Status: COMPLETED
Start: 2017-04-16 | End: 2017-04-16

## 2017-04-16 RX ORDER — DIPHENHYDRAMINE HYDROCHLORIDE 50 MG/ML
25 INJECTION INTRAMUSCULAR; INTRAVENOUS ONCE
Status: COMPLETED | OUTPATIENT
Start: 2017-04-16 | End: 2017-04-16

## 2017-04-16 RX ORDER — ONDANSETRON 2 MG/ML
4 INJECTION INTRAMUSCULAR; INTRAVENOUS ONCE
Status: COMPLETED | OUTPATIENT
Start: 2017-04-16 | End: 2017-04-16

## 2017-04-16 RX ORDER — SODIUM CHLORIDE 0.9 % (FLUSH) 0.9 %
10 SYRINGE (ML) INJECTION AS NEEDED
Status: DISCONTINUED | OUTPATIENT
Start: 2017-04-16 | End: 2017-04-16 | Stop reason: HOSPADM

## 2017-04-16 RX ADMIN — BUTORPHANOL TARTRATE 2 MG: 2 INJECTION, SOLUTION INTRAMUSCULAR; INTRAVENOUS at 11:03

## 2017-04-16 RX ADMIN — BUTORPHANOL TARTRATE 2 MG: 2 INJECTION, SOLUTION INTRAMUSCULAR; INTRAVENOUS at 12:40

## 2017-04-16 RX ADMIN — ONDANSETRON 4 MG: 2 INJECTION INTRAMUSCULAR; INTRAVENOUS at 11:01

## 2017-04-16 RX ADMIN — ONDANSETRON 4 MG: 2 INJECTION, SOLUTION INTRAMUSCULAR; INTRAVENOUS at 11:01

## 2017-04-16 RX ADMIN — DIPHENHYDRAMINE HYDROCHLORIDE 25 MG: 50 INJECTION INTRAMUSCULAR; INTRAVENOUS at 10:57

## 2017-04-16 RX ADMIN — DEXAMETHASONE SODIUM PHOSPHATE 10 MG: 4 INJECTION, SOLUTION INTRAMUSCULAR; INTRAVENOUS at 11:04

## 2017-04-16 RX ADMIN — SODIUM CHLORIDE 1000 ML: 9 INJECTION, SOLUTION INTRAVENOUS at 10:55

## 2017-04-16 NOTE — ED PROVIDER NOTES
Subjective   Patient is a 42 y.o. female presenting with migraines.   History provided by:  Patient   used: No    Migraine   Quality:  Sharp  Severity at highest:  8/10  Duration:  2 days  Timing:  Constant  Progression:  Worsening  Chronicity:  Recurrent  Similar to prior headaches: no    Context: not activity, not exposure to bright light, not caffeine, not defecating and not eating    Relieved by:  Nothing  Worsened by:  Nothing  Associated symptoms: no abdominal pain, no back pain, no blurred vision, no congestion, no diarrhea, no dizziness, no eye pain, no fatigue, no hearing loss, no loss of balance, no near-syncope, no neck pain, no paresthesias, no photophobia, no syncope, no tingling and no visual change    Risk factors: no anger, no family hx of SAH and does not have insomnia        Review of Systems   Constitutional: Negative for fatigue.   HENT: Negative for congestion and hearing loss.    Eyes: Negative for blurred vision, photophobia and pain.   Cardiovascular: Negative for syncope and near-syncope.   Gastrointestinal: Negative for abdominal pain and diarrhea.   Musculoskeletal: Negative for back pain and neck pain.   Neurological: Positive for headaches. Negative for dizziness, paresthesias and loss of balance.   All other systems reviewed and are negative.      Past Medical History:   Diagnosis Date   • Abdominal pain    • Abdominal swelling    • Bipolar 1 disorder    • Brain tumor     R Frontal Lobe per pt   • Constipation    • Diarrhea    • Fibromyalgia    • IBS (irritable bowel syndrome)    • Migraine    • Nausea & vomiting    • PONV (postoperative nausea and vomiting)    • PTSD (post-traumatic stress disorder)    • Rectal bleeding        Allergies   Allergen Reactions   • Ativan [Lorazepam] Hallucinations     confusion   • Sulfa Antibiotics Shortness Of Breath and Swelling   • Compazine [Prochlorperazine Edisylate] Hives   • Demerol [Meperidine] Hives   • Droperidol    •  Prochlorperazine    • Toradol [Ketorolac Tromethamine] Hives and Itching       Past Surgical History:   Procedure Laterality Date   • ANAL SCOPE N/A 7/28/2016    Procedure: ANAL SCOPE;  Surgeon: Kael Lopez MD;  Location: [facility] OR;  Service:    • APPENDECTOMY     • COLONOSCOPY N/A 6/30/2016    Procedure: COLONOSCOPY  CPTCODE:16534;  Surgeon: Jose Antonio Belle III, MD;  Location: [facility] OR;  Service:    • COLONOSCOPY N/A 7/7/2016    Procedure: COLONOSCOPY (87346) CPT;  Surgeon: Jose Antonio Belle III, MD;  Location: [facility] OR;  Service:    • ENDOSCOPY N/A 6/30/2016    Procedure: ESOPHAGOGASTRODUODENOSCOPY WITH BIOPSY  CPTCODE:44947;  Surgeon: Jose Antonio Belle III, MD;  Location: [facility] OR;  Service:    • HEMORRHOIDECTOMY N/A 7/28/2016    Procedure: HEMORRHOID STAPLING;  Surgeon: Kael Lopez MD;  Location: [facility] OR;  Service:    • HYSTERECTOMY     • KNEE SURGERY     • SHOULDER SURGERY      3 times       Family History   Problem Relation Age of Onset   • Crohn's disease Other    • Hypertension Other    • Diabetes Other    • Irritable bowel syndrome Other        Social History     Social History   • Marital status:      Spouse name: N/A   • Number of children: N/A   • Years of education: N/A     Social History Main Topics   • Smoking status: Former Smoker     Types: Cigarettes     Quit date: 11/1/2015   • Smokeless tobacco: Never Used   • Alcohol use No   • Drug use: Yes     Special: Marijuana   • Sexual activity: Defer     Other Topics Concern   • None     Social History Narrative           Objective   Physical Exam   Constitutional: She is oriented to person, place, and time. She appears well-developed and well-nourished.   HENT:   Head: Normocephalic.   Right Ear: External ear normal.   Left Ear: External ear normal.   Nose: Nose normal.   Mouth/Throat: Oropharynx is clear and moist.   Eyes: Conjunctivae and EOM are normal. Pupils are equal, round, and reactive to light.   Neck: Normal range of  motion. Neck supple. No tracheal deviation present. No thyromegaly present.   Cardiovascular: Normal rate, regular rhythm, normal heart sounds and intact distal pulses.    Pulmonary/Chest: Effort normal and breath sounds normal.   Abdominal: Soft. Bowel sounds are normal.   Musculoskeletal: Normal range of motion.   Neurological: She is alert and oriented to person, place, and time. She has normal reflexes.   Skin: Skin is warm and dry.   Psychiatric: She has a normal mood and affect. Her behavior is normal. Judgment and thought content normal.   Nursing note and vitals reviewed.      Procedures         ED Course  ED Course   Comment By Time   This is a 42-year-old female comes in with chief complaint migraine headache ×2-3 days.  Patient states she has had a sharp, throbbing temporal headache with associated nausea and vomiting.  Patient denies any fever, chills, body aches.  No reported neck stiffness.  Patient does report she has a long history of migraine headaches that she sees Dr. Pérez for. Mark Stubbs PA-C 04/16 1051   Patient still complains of headache.  Rates her pain 4 out of 10. Mark Stubbs PA-C 04/16 1229                  MDM  Number of Diagnoses or Management Options  Migraine without aura and without status migrainosus, not intractable: new and requires workup  Risk of Complications, Morbidity, and/or Mortality  Presenting problems: moderate  Diagnostic procedures: moderate  Management options: moderate    Patient Progress  Patient progress: stable      Final diagnoses:   Migraine without aura and without status migrainosus, not intractable            Mark Stubbs PA-C  04/16/17 1302

## 2017-04-16 NOTE — ED NOTES
Discharge instructions reviewed with patient, patient instructed to return to ED if symptoms worsen or if any new problems arise. Patient verbalizes understanding of discharge instructions, patient ambulatory out of ED. No acute distress noted.       Cici Huynh RN  04/16/17 0495

## 2017-04-18 LAB — BACTERIA SPEC AEROBE CULT: NORMAL

## 2017-04-19 ENCOUNTER — HOSPITAL ENCOUNTER (EMERGENCY)
Facility: HOSPITAL | Age: 42
Discharge: HOME | End: 2017-04-19
Attending: EMERGENCY MEDICINE | Admitting: EMERGENCY MEDICINE

## 2017-04-19 VITALS
BODY MASS INDEX: 24.14 KG/M2 | DIASTOLIC BLOOD PRESSURE: 78 MMHG | RESPIRATION RATE: 18 BRPM | TEMPERATURE: 97.8 F | WEIGHT: 163 LBS | HEART RATE: 70 BPM | HEIGHT: 69 IN | OXYGEN SATURATION: 99 % | SYSTOLIC BLOOD PRESSURE: 111 MMHG

## 2017-04-19 DIAGNOSIS — G43.909 MIGRAINE WITHOUT STATUS MIGRAINOSUS, NOT INTRACTABLE, UNSPECIFIED MIGRAINE TYPE: Primary | ICD-10-CM

## 2017-04-19 LAB
ALBUMIN SERPL-MCNC: 4.1 G/DL (ref 3.5–5)
ALBUMIN/GLOB SERPL: 1.2 G/DL (ref 1.5–2.5)
ALP SERPL-CCNC: 54 U/L (ref 35–104)
ALT SERPL W P-5'-P-CCNC: 45 U/L (ref 10–36)
ANION GAP SERPL CALCULATED.3IONS-SCNC: 8 MMOL/L (ref 3.6–11.2)
AST SERPL-CCNC: 48 U/L (ref 10–30)
BACTERIA UR QL AUTO: ABNORMAL /HPF
BASOPHILS # BLD AUTO: 0.02 10*3/MM3 (ref 0–0.3)
BASOPHILS NFR BLD AUTO: 0.3 % (ref 0–2)
BILIRUB SERPL-MCNC: 0.7 MG/DL (ref 0.2–1.8)
BILIRUB UR QL STRIP: NEGATIVE
BUN BLD-MCNC: 11 MG/DL (ref 7–21)
BUN/CREAT SERPL: 16.9 (ref 7–25)
CALCIUM SPEC-SCNC: 9.3 MG/DL (ref 7.7–10)
CHLORIDE SERPL-SCNC: 108 MMOL/L (ref 99–112)
CLARITY UR: CLEAR
CO2 SERPL-SCNC: 26 MMOL/L (ref 24.3–31.9)
COLOR UR: YELLOW
CREAT BLD-MCNC: 0.65 MG/DL (ref 0.43–1.29)
DEPRECATED RDW RBC AUTO: 44.8 FL (ref 37–54)
EOSINOPHIL # BLD AUTO: 0.08 10*3/MM3 (ref 0–0.7)
EOSINOPHIL NFR BLD AUTO: 1.2 % (ref 0–5)
ERYTHROCYTE [DISTWIDTH] IN BLOOD BY AUTOMATED COUNT: 13.6 % (ref 11.5–14.5)
GFR SERPL CREATININE-BSD FRML MDRD: 100 ML/MIN/1.73
GLOBULIN UR ELPH-MCNC: 3.3 GM/DL
GLUCOSE BLD-MCNC: 80 MG/DL (ref 70–110)
GLUCOSE UR STRIP-MCNC: NEGATIVE MG/DL
HCT VFR BLD AUTO: 43.7 % (ref 37–47)
HGB BLD-MCNC: 14.5 G/DL (ref 12–16)
HGB UR QL STRIP.AUTO: NEGATIVE
IMM GRANULOCYTES # BLD: 0.04 10*3/MM3 (ref 0–0.03)
IMM GRANULOCYTES NFR BLD: 0.6 % (ref 0–0.5)
KETONES UR QL STRIP: NEGATIVE
LEUKOCYTE ESTERASE UR QL STRIP.AUTO: ABNORMAL
LYMPHOCYTES # BLD AUTO: 3.34 10*3/MM3 (ref 1–3)
LYMPHOCYTES NFR BLD AUTO: 48.8 % (ref 21–51)
MCH RBC QN AUTO: 31.7 PG (ref 27–33)
MCHC RBC AUTO-ENTMCNC: 33.2 G/DL (ref 33–37)
MCV RBC AUTO: 95.4 FL (ref 80–94)
MONOCYTES # BLD AUTO: 0.56 10*3/MM3 (ref 0.1–0.9)
MONOCYTES NFR BLD AUTO: 8.2 % (ref 0–10)
NEUTROPHILS # BLD AUTO: 2.8 10*3/MM3 (ref 1.4–6.5)
NEUTROPHILS NFR BLD AUTO: 40.9 % (ref 30–70)
NITRITE UR QL STRIP: NEGATIVE
OSMOLALITY SERPL CALC.SUM OF ELEC: 281.5 MOSM/KG (ref 273–305)
PH UR STRIP.AUTO: 6 [PH] (ref 5–8)
PLATELET # BLD AUTO: 114 10*3/MM3 (ref 130–400)
PMV BLD AUTO: 10 FL (ref 6–10)
POTASSIUM BLD-SCNC: 3.9 MMOL/L (ref 3.5–5.3)
PROT SERPL-MCNC: 7.4 G/DL (ref 6–8)
PROT UR QL STRIP: NEGATIVE
RBC # BLD AUTO: 4.58 10*6/MM3 (ref 4.2–5.4)
RBC # UR: ABNORMAL /HPF
REF LAB TEST METHOD: ABNORMAL
SODIUM BLD-SCNC: 142 MMOL/L (ref 135–153)
SP GR UR STRIP: 1.02 (ref 1–1.03)
SQUAMOUS #/AREA URNS HPF: ABNORMAL /HPF
UROBILINOGEN UR QL STRIP: ABNORMAL
VALPROATE SERPL-MCNC: 61.1 MCG/ML (ref 50–100)
WBC NRBC COR # BLD: 6.84 10*3/MM3 (ref 4.5–12.5)
WBC UR QL AUTO: ABNORMAL /HPF

## 2017-04-19 PROCEDURE — 85025 COMPLETE CBC W/AUTO DIFF WBC: CPT | Performed by: PHYSICIAN ASSISTANT

## 2017-04-19 PROCEDURE — 25010000002 BUTORPHANOL PER 1 MG: Performed by: PHYSICIAN ASSISTANT

## 2017-04-19 PROCEDURE — 96376 TX/PRO/DX INJ SAME DRUG ADON: CPT

## 2017-04-19 PROCEDURE — 87086 URINE CULTURE/COLONY COUNT: CPT | Performed by: PHYSICIAN ASSISTANT

## 2017-04-19 PROCEDURE — 96375 TX/PRO/DX INJ NEW DRUG ADDON: CPT

## 2017-04-19 PROCEDURE — 25010000002 DEXAMETHASONE PER 1 MG: Performed by: PHYSICIAN ASSISTANT

## 2017-04-19 PROCEDURE — 81001 URINALYSIS AUTO W/SCOPE: CPT | Performed by: PHYSICIAN ASSISTANT

## 2017-04-19 PROCEDURE — 80164 ASSAY DIPROPYLACETIC ACD TOT: CPT | Performed by: PHYSICIAN ASSISTANT

## 2017-04-19 PROCEDURE — 25010000002 ONDANSETRON PER 1 MG: Performed by: PHYSICIAN ASSISTANT

## 2017-04-19 PROCEDURE — 80053 COMPREHEN METABOLIC PANEL: CPT | Performed by: PHYSICIAN ASSISTANT

## 2017-04-19 PROCEDURE — 99283 EMERGENCY DEPT VISIT LOW MDM: CPT

## 2017-04-19 PROCEDURE — 96374 THER/PROPH/DIAG INJ IV PUSH: CPT

## 2017-04-19 RX ORDER — ONDANSETRON 2 MG/ML
4 INJECTION INTRAMUSCULAR; INTRAVENOUS ONCE
Status: COMPLETED | OUTPATIENT
Start: 2017-04-19 | End: 2017-04-19

## 2017-04-19 RX ORDER — SODIUM CHLORIDE 0.9 % (FLUSH) 0.9 %
10 SYRINGE (ML) INJECTION AS NEEDED
Status: DISCONTINUED | OUTPATIENT
Start: 2017-04-19 | End: 2017-04-19 | Stop reason: HOSPADM

## 2017-04-19 RX ADMIN — BUTORPHANOL TARTRATE 2 MG: 2 INJECTION, SOLUTION INTRAMUSCULAR; INTRAVENOUS at 05:25

## 2017-04-19 RX ADMIN — DEXAMETHASONE SODIUM PHOSPHATE 10 MG: 4 INJECTION, SOLUTION INTRAMUSCULAR; INTRAVENOUS at 05:54

## 2017-04-19 RX ADMIN — BUTORPHANOL TARTRATE 1 MG: 2 INJECTION, SOLUTION INTRAMUSCULAR; INTRAVENOUS at 06:12

## 2017-04-19 RX ADMIN — ONDANSETRON 4 MG: 2 INJECTION INTRAMUSCULAR; INTRAVENOUS at 05:22

## 2017-04-19 NOTE — ED PROVIDER NOTES
Subjective   Patient is a 42 y.o. female presenting with migraines.   History provided by:  Patient   used: No    Migraine   Pain location:  Generalized  Quality:  Dull  Radiates to:  Does not radiate  Severity currently:  7/10  Severity at highest:  7/10  Onset quality:  Sudden  Duration:  5 days  Timing:  Constant  Progression:  Worsening  Chronicity:  New  Similar to prior headaches: yes    Context: bright light    Relieved by:  Nothing  Worsened by:  Nothing  Ineffective treatments:  None tried  Risk factors: no anger, no family hx of SAH, does not have insomnia and lifestyle not sedentary        Review of Systems   Constitutional: Negative.    HENT: Negative.    Eyes: Negative.    Respiratory: Negative.    Cardiovascular: Negative.    Gastrointestinal: Negative.    Endocrine: Negative.    Genitourinary: Negative.    Musculoskeletal: Negative.    Skin: Negative.    Allergic/Immunologic: Negative.    Neurological: Negative.    Hematological: Negative.    Psychiatric/Behavioral: Negative.    All other systems reviewed and are negative.      Past Medical History:   Diagnosis Date   • Abdominal pain    • Abdominal swelling    • Bipolar 1 disorder    • Brain tumor     R Frontal Lobe per pt   • Constipation    • Diarrhea    • Fibromyalgia    • IBS (irritable bowel syndrome)    • Migraine    • Nausea & vomiting    • PONV (postoperative nausea and vomiting)    • PTSD (post-traumatic stress disorder)    • Rectal bleeding        Allergies   Allergen Reactions   • Ativan [Lorazepam] Hallucinations     confusion   • Sulfa Antibiotics Shortness Of Breath and Swelling   • Compazine [Prochlorperazine Edisylate] Hives   • Demerol [Meperidine] Hives   • Droperidol    • Prochlorperazine    • Toradol [Ketorolac Tromethamine] Hives and Itching       Past Surgical History:   Procedure Laterality Date   • ANAL SCOPE N/A 7/28/2016    Procedure: ANAL SCOPE;  Surgeon: Kael Lopez MD;  Location: [facility] OR;   Service:    • APPENDECTOMY     • COLONOSCOPY N/A 6/30/2016    Procedure: COLONOSCOPY  CPTCODE:25072;  Surgeon: Jose Antonio Belle III, MD;  Location: [facility] OR;  Service:    • COLONOSCOPY N/A 7/7/2016    Procedure: COLONOSCOPY (84468) CPT;  Surgeon: Jose Antonio Belle III, MD;  Location: [facility] OR;  Service:    • ENDOSCOPY N/A 6/30/2016    Procedure: ESOPHAGOGASTRODUODENOSCOPY WITH BIOPSY  CPTCODE:36381;  Surgeon: Jose Antonio Belle III, MD;  Location: [facility] OR;  Service:    • HEMORRHOIDECTOMY N/A 7/28/2016    Procedure: HEMORRHOID STAPLING;  Surgeon: Kael Lopez MD;  Location: [facility] OR;  Service:    • HYSTERECTOMY     • KNEE SURGERY     • SHOULDER SURGERY      3 times       Family History   Problem Relation Age of Onset   • Crohn's disease Other    • Hypertension Other    • Diabetes Other    • Irritable bowel syndrome Other        Social History     Social History   • Marital status:      Spouse name: N/A   • Number of children: N/A   • Years of education: N/A     Social History Main Topics   • Smoking status: Former Smoker     Types: Cigarettes     Quit date: 11/1/2015   • Smokeless tobacco: Never Used   • Alcohol use No   • Drug use: Yes     Special: Marijuana   • Sexual activity: Defer     Other Topics Concern   • None     Social History Narrative   • None           Objective   Physical Exam   Constitutional: She is oriented to person, place, and time. She appears well-developed and well-nourished.   HENT:   Head: Normocephalic and atraumatic.   Right Ear: External ear normal.   Left Ear: External ear normal.   Nose: Nose normal.   Mouth/Throat: Oropharynx is clear and moist.   Eyes: EOM are normal. Pupils are equal, round, and reactive to light.   Neck: Normal range of motion. Neck supple.   Cardiovascular: Normal rate, regular rhythm, normal heart sounds and intact distal pulses.    Pulmonary/Chest: Effort normal and breath sounds normal.   Abdominal: Soft. Bowel sounds are normal.    Musculoskeletal: Normal range of motion.   Neurological: She is alert and oriented to person, place, and time.   Skin: Skin is warm and dry.   Psychiatric: She has a normal mood and affect. Her behavior is normal. Judgment and thought content normal.   Nursing note and vitals reviewed.      Procedures         ED Course  ED Course                  MDM    Final diagnoses:   Migraine without status migrainosus, not intractable, unspecified migraine type            TAMI Santos  04/19/17 0608       TAMI Santos  04/19/17 0640

## 2017-04-21 ENCOUNTER — HOSPITAL ENCOUNTER (EMERGENCY)
Facility: HOSPITAL | Age: 42
Discharge: HOME | End: 2017-04-21
Attending: EMERGENCY MEDICINE | Admitting: EMERGENCY MEDICINE

## 2017-04-21 ENCOUNTER — APPOINTMENT (OUTPATIENT)
Dept: GENERAL RADIOLOGY | Facility: HOSPITAL | Age: 42
End: 2017-04-21

## 2017-04-21 ENCOUNTER — APPOINTMENT (OUTPATIENT)
Dept: CT IMAGING | Facility: HOSPITAL | Age: 42
End: 2017-04-21

## 2017-04-21 VITALS
HEART RATE: 100 BPM | SYSTOLIC BLOOD PRESSURE: 110 MMHG | TEMPERATURE: 98.2 F | RESPIRATION RATE: 16 BRPM | BODY MASS INDEX: 24.14 KG/M2 | OXYGEN SATURATION: 97 % | HEIGHT: 69 IN | WEIGHT: 163 LBS | DIASTOLIC BLOOD PRESSURE: 76 MMHG

## 2017-04-21 DIAGNOSIS — S42.002A CLOSED FRACTURE OF LEFT CLAVICLE, UNSPECIFIED PART OF CLAVICLE, INITIAL ENCOUNTER: Primary | ICD-10-CM
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
## 2017-04-21 LAB — BACTERIA SPEC AEROBE CULT: NORMAL

## 2017-04-21 PROCEDURE — 70450 CT HEAD/BRAIN W/O DYE: CPT | Performed by: RADIOLOGY

## 2017-04-21 PROCEDURE — 72125 CT NECK SPINE W/O DYE: CPT

## 2017-04-21 PROCEDURE — 96372 THER/PROPH/DIAG INJ SC/IM: CPT

## 2017-04-21 PROCEDURE — 73610 X-RAY EXAM OF ANKLE: CPT

## 2017-04-21 PROCEDURE — 70486 CT MAXILLOFACIAL W/O DYE: CPT | Performed by: RADIOLOGY

## 2017-04-21 PROCEDURE — 70450 CT HEAD/BRAIN W/O DYE: CPT

## 2017-04-21 PROCEDURE — 25010000002 BUTORPHANOL PER 1 MG: Performed by: PHYSICIAN ASSISTANT

## 2017-04-21 PROCEDURE — 99284 EMERGENCY DEPT VISIT MOD MDM: CPT

## 2017-04-21 PROCEDURE — 70486 CT MAXILLOFACIAL W/O DYE: CPT

## 2017-04-21 PROCEDURE — 72125 CT NECK SPINE W/O DYE: CPT | Performed by: RADIOLOGY

## 2017-04-21 PROCEDURE — 73610 X-RAY EXAM OF ANKLE: CPT | Performed by: RADIOLOGY

## 2017-04-21 PROCEDURE — 73030 X-RAY EXAM OF SHOULDER: CPT

## 2017-04-21 PROCEDURE — 73030 X-RAY EXAM OF SHOULDER: CPT | Performed by: RADIOLOGY

## 2017-04-21 PROCEDURE — 25010000002 BUTORPHANOL PER 1 MG: Performed by: EMERGENCY MEDICINE

## 2017-04-21 RX ORDER — ONDANSETRON 4 MG/1
4 TABLET, ORALLY DISINTEGRATING ORAL ONCE
Status: COMPLETED | OUTPATIENT
Start: 2017-04-21 | End: 2017-04-21

## 2017-04-21 RX ORDER — HYDROCODONE BITARTRATE AND ACETAMINOPHEN 5; 325 MG/1; MG/1
1 TABLET ORAL EVERY 6 HOURS PRN
Qty: 12 TABLET | Refills: 0 | Status: SHIPPED | OUTPATIENT
Start: 2017-04-21 | End: 2017-04-27

## 2017-04-21 RX ADMIN — BUTORPHANOL TARTRATE 2 MG: 2 INJECTION, SOLUTION INTRAMUSCULAR; INTRAVENOUS at 16:29

## 2017-04-21 RX ADMIN — ONDANSETRON 4 MG: 4 TABLET, ORALLY DISINTEGRATING ORAL at 13:40

## 2017-04-21 RX ADMIN — BUTORPHANOL TARTRATE 2 MG: 2 INJECTION, SOLUTION INTRAMUSCULAR; INTRAVENOUS at 13:40

## 2017-04-21 NOTE — ED NOTES
Patient resting on stretcher, family at bedside, updated regarding wait times and POC. Patient verbalizes understanding. Skin PWD, no signs or symptoms of respiratory distress noted. No further needs voiced, will continue to monitor.        Cici Huynh RN  04/21/17 5923

## 2017-04-21 NOTE — ED NOTES
Discharge instructions reviewed with patient, patient instructed to return to ED if symptoms worsen or if any new problems arise. Patient verbalizes understanding of discharge instructions, patient ambulatory out of ED. No acute distress noted.       Cici Huynh RN  04/21/17 2482

## 2017-04-21 NOTE — ED NOTES
Diet tray ordered for patient and , will continue to monitor.      Cici Huynh, RN  04/21/17 0363

## 2017-04-21 NOTE — ED PROVIDER NOTES
Subjective   Patient is a 42 y.o. female presenting with fall.   History provided by:  Patient   used: No    Fall   Mechanism of injury: fall    Injury location:  Head/neck  Time since incident:  1 day  Arrived directly from scene: no    Fall:     Fall occurred:  Down stairs    Point of impact:  Neck and feet    Entrapped after fall: no    Suspicion of alcohol use: no    Suspicion of drug use: no    Tetanus status:  Unknown  Prior to arrival data:     Bystander interventions:  None    Patient ambulatory at scene: no      Blood loss:  None    Loss of consciousness: no      Amnesic to event: no      Airway interventions:  None    IV access status:  None    Fluids administered:  None    Cardiac interventions:  None    Immobilization:  None  Associated symptoms: no abdominal pain, no back pain, no blindness, no difficulty breathing, no headaches, no hearing loss, no nausea, no neck pain and no seizures    Risk factors: no AICD, no asthma, no beta blocker therapy, no CHF, no COPD and no past MI        Review of Systems   HENT: Negative for hearing loss.    Eyes: Negative for blindness.   Gastrointestinal: Negative for abdominal pain and nausea.   Musculoskeletal: Positive for joint swelling and myalgias. Negative for back pain and neck pain.   Neurological: Negative for seizures and headaches.   All other systems reviewed and are negative.      Past Medical History:   Diagnosis Date   • Abdominal pain    • Abdominal swelling    • Bipolar 1 disorder    • Brain tumor     R Frontal Lobe per pt   • Constipation    • Diarrhea    • Fibromyalgia    • IBS (irritable bowel syndrome)    • Migraine    • Nausea & vomiting    • PONV (postoperative nausea and vomiting)    • PTSD (post-traumatic stress disorder)    • Rectal bleeding        Allergies   Allergen Reactions   • Ativan [Lorazepam] Hallucinations     confusion   • Sulfa Antibiotics Shortness Of Breath and Swelling   • Compazine [Prochlorperazine  Edisylate] Hives   • Demerol [Meperidine] Hives   • Droperidol    • Prochlorperazine    • Toradol [Ketorolac Tromethamine] Hives and Itching       Past Surgical History:   Procedure Laterality Date   • ANAL SCOPE N/A 7/28/2016    Procedure: ANAL SCOPE;  Surgeon: Kael Lopez MD;  Location: [facility] OR;  Service:    • APPENDECTOMY     • COLONOSCOPY N/A 6/30/2016    Procedure: COLONOSCOPY  CPTCODE:78175;  Surgeon: Jose Antonio Belle III, MD;  Location: [facility] OR;  Service:    • COLONOSCOPY N/A 7/7/2016    Procedure: COLONOSCOPY (35335) CPT;  Surgeon: Jose Antonio Belle III, MD;  Location: [facility] OR;  Service:    • ENDOSCOPY N/A 6/30/2016    Procedure: ESOPHAGOGASTRODUODENOSCOPY WITH BIOPSY  CPTCODE:23107;  Surgeon: Jose Antonio Belle III, MD;  Location: [facility] OR;  Service:    • HEMORRHOIDECTOMY N/A 7/28/2016    Procedure: HEMORRHOID STAPLING;  Surgeon: Kael Lopez MD;  Location: [facility] OR;  Service:    • HYSTERECTOMY     • KNEE SURGERY     • SHOULDER SURGERY      3 times       Family History   Problem Relation Age of Onset   • Crohn's disease Other    • Hypertension Other    • Diabetes Other    • Irritable bowel syndrome Other        Social History     Social History   • Marital status:      Spouse name: N/A   • Number of children: N/A   • Years of education: N/A     Social History Main Topics   • Smoking status: Former Smoker     Types: Cigarettes     Quit date: 11/1/2015   • Smokeless tobacco: Never Used   • Alcohol use No   • Drug use: Yes     Special: Marijuana   • Sexual activity: Defer     Other Topics Concern   • None     Social History Narrative           Objective   Physical Exam   Constitutional: She is oriented to person, place, and time. She appears well-developed and well-nourished.   HENT:   Head: Normocephalic. Head is with contusion. Head is without raccoon's eyes and without Salazar's sign.       Right Ear: External ear normal.   Left Ear: External ear normal.   Nose: Nose normal.    Mouth/Throat: Oropharynx is clear and moist.   Eyes: Conjunctivae and EOM are normal. Pupils are equal, round, and reactive to light.   Neck: Normal range of motion. Neck supple. No tracheal deviation present. No thyromegaly present.   Cardiovascular: Normal rate, regular rhythm, normal heart sounds and intact distal pulses.    Pulmonary/Chest: Effort normal and breath sounds normal.   Abdominal: Soft. Bowel sounds are normal.   Musculoskeletal: She exhibits tenderness.        Left shoulder: She exhibits decreased range of motion, tenderness, pain and spasm.        Left ankle: She exhibits decreased range of motion and swelling.   Neurological: She is alert and oriented to person, place, and time. She has normal reflexes.   Skin: Skin is warm and dry.   Psychiatric: She has a normal mood and affect. Her behavior is normal. Judgment and thought content normal.   Nursing note and vitals reviewed.      Splint Application  Date/Time: 4/21/2017 4:04 PM  Performed by: KELLY STUBBS  Authorized by: JE GUEVARA   Consent: Verbal consent obtained. Written consent obtained.  Risks and benefits: risks, benefits and alternatives were discussed  Consent given by: patient  Patient understanding: patient states understanding of the procedure being performed  Patient consent: the patient's understanding of the procedure matches consent given  Patient identity confirmed: verbally with patient  Location details: left arm  Splint type: sling   Supplies used: velcro.  Post-procedure: The splinted body part was neurovascularly unchanged following the procedure.  Patient tolerance: Patient tolerated the procedure well with no immediate complications               ED Course  ED Course   Comment By Time   Romeo login [id number]  Patient has was seen after falling and hitting her face on bathroom cabinet. Patient complained of left shoulder pain. Kelly Stubbs PA-C 04/21 1557                  [facility]  Number of  Diagnoses or Management Options  Closed fracture of left clavicle, unspecified part of clavicle, initial encounter: new and requires workup  Fall against object: new and requires workup     Amount and/or Complexity of Data Reviewed  Tests in the radiology section of CPT®: ordered and reviewed  Independent visualization of images, tracings, or specimens: yes    Risk of Complications, Morbidity, and/or Mortality  Presenting problems: moderate  Diagnostic procedures: moderate  Management options: moderate    Patient Progress  Patient progress: stable      Final diagnoses:   Closed fracture of left clavicle, unspecified part of clavicle, initial encounter   Fall against object            Mark Stubbs PA-C  04/21/17 1634

## 2017-04-23 ENCOUNTER — HOSPITAL ENCOUNTER (EMERGENCY)
Facility: HOSPITAL | Age: 42
Discharge: HOME | End: 2017-04-23
Admitting: EMERGENCY MEDICINE

## 2017-04-23 VITALS
SYSTOLIC BLOOD PRESSURE: 121 MMHG | OXYGEN SATURATION: 97 % | DIASTOLIC BLOOD PRESSURE: 82 MMHG | WEIGHT: 163 LBS | TEMPERATURE: 97.6 F | HEIGHT: 69 IN | BODY MASS INDEX: 24.14 KG/M2 | HEART RATE: 74 BPM | RESPIRATION RATE: 16 BRPM

## 2017-04-23 DIAGNOSIS — R51.9 ACUTE NONINTRACTABLE HEADACHE, UNSPECIFIED HEADACHE TYPE: Primary | ICD-10-CM

## 2017-04-23 PROCEDURE — 25010000002 BUTORPHANOL PER 1 MG: Performed by: NURSE PRACTITIONER

## 2017-04-23 PROCEDURE — 96375 TX/PRO/DX INJ NEW DRUG ADDON: CPT

## 2017-04-23 PROCEDURE — 96374 THER/PROPH/DIAG INJ IV PUSH: CPT

## 2017-04-23 PROCEDURE — 96361 HYDRATE IV INFUSION ADD-ON: CPT

## 2017-04-23 PROCEDURE — 25010000002 DIPHENHYDRAMINE PER 50 MG: Performed by: NURSE PRACTITIONER

## 2017-04-23 PROCEDURE — 25010000002 DEXAMETHASONE PER 1 MG: Performed by: NURSE PRACTITIONER

## 2017-04-23 PROCEDURE — 99283 EMERGENCY DEPT VISIT LOW MDM: CPT

## 2017-04-23 PROCEDURE — 25010000002 PROMETHAZINE PER 50 MG: Performed by: NURSE PRACTITIONER

## 2017-04-23 RX ORDER — PROMETHAZINE HYDROCHLORIDE 25 MG/ML
12.5 INJECTION, SOLUTION INTRAMUSCULAR; INTRAVENOUS ONCE
Status: COMPLETED | OUTPATIENT
Start: 2017-04-23 | End: 2017-04-23

## 2017-04-23 RX ORDER — BUTORPHANOL TARTRATE 1 MG/ML
1 INJECTION, SOLUTION INTRAMUSCULAR; INTRAVENOUS ONCE
Status: COMPLETED | OUTPATIENT
Start: 2017-04-23 | End: 2017-04-23

## 2017-04-23 RX ORDER — SODIUM CHLORIDE 0.9 % (FLUSH) 0.9 %
10 SYRINGE (ML) INJECTION AS NEEDED
Status: DISCONTINUED | OUTPATIENT
Start: 2017-04-23 | End: 2017-04-23 | Stop reason: HOSPADM

## 2017-04-23 RX ORDER — DEXAMETHASONE SODIUM PHOSPHATE 4 MG/ML
8 INJECTION, SOLUTION INTRA-ARTICULAR; INTRALESIONAL; INTRAMUSCULAR; INTRAVENOUS; SOFT TISSUE ONCE
Status: COMPLETED | OUTPATIENT
Start: 2017-04-23 | End: 2017-04-23

## 2017-04-23 RX ORDER — DIPHENHYDRAMINE HYDROCHLORIDE 50 MG/ML
25 INJECTION INTRAMUSCULAR; INTRAVENOUS ONCE
Status: COMPLETED | OUTPATIENT
Start: 2017-04-23 | End: 2017-04-23

## 2017-04-23 RX ADMIN — DEXAMETHASONE SODIUM PHOSPHATE 8 MG: 4 INJECTION, SOLUTION INTRAMUSCULAR; INTRAVENOUS at 19:11

## 2017-04-23 RX ADMIN — DIPHENHYDRAMINE HYDROCHLORIDE 25 MG: 50 INJECTION INTRAMUSCULAR; INTRAVENOUS at 19:13

## 2017-04-23 RX ADMIN — SODIUM CHLORIDE 1000 ML: 9 INJECTION, SOLUTION INTRAVENOUS at 19:17

## 2017-04-23 RX ADMIN — BUTORPHANOL TARTRATE 1 MG: 1 INJECTION, SOLUTION INTRAMUSCULAR; INTRAVENOUS at 20:00

## 2017-04-23 RX ADMIN — PROMETHAZINE HYDROCHLORIDE 12.5 MG: 25 INJECTION, SOLUTION INTRAMUSCULAR; INTRAVENOUS at 19:09

## 2017-04-23 NOTE — ED PROVIDER NOTES
Subjective   Patient is a 42 y.o. female presenting with headaches.   History provided by:  Patient   used: No    Headache   Pain location:  Generalized  Quality:  Dull  Radiates to:  Does not radiate  Severity currently:  9/10  Severity at highest:  9/10  Onset quality:  Sudden  Duration:  2 days  Timing:  Constant  Progression:  Waxing and waning  Chronicity:  New  Similar to prior headaches: yes    Context: bright light    Context: not activity, not caffeine, not coughing, not defecating, not eating, not stress, not exposure to cold air, not intercourse, not loud noise and not straining    Relieved by:  None tried  Worsened by:  Nothing  Ineffective treatments:  None tried  Associated symptoms: back pain    Associated symptoms: no abdominal pain, no blurred vision, no cough, no diarrhea, no ear pain, no eye pain, no facial pain, no fatigue, no fever, no focal weakness, no hearing loss, no nausea, no near-syncope, no neck pain, no neck stiffness, no numbness, no paresthesias, no photophobia, no sore throat, no swollen glands, no syncope, no tingling, no URI and no visual change    Risk factors: no anger, no family hx of SAH, does not have insomnia and lifestyle not sedentary        Review of Systems   Constitutional: Negative for fatigue and fever.   HENT: Negative for ear pain, hearing loss and sore throat.    Eyes: Negative for blurred vision, photophobia and pain.   Respiratory: Negative for cough.    Cardiovascular: Negative for syncope and near-syncope.   Gastrointestinal: Negative for abdominal pain, diarrhea and nausea.   Musculoskeletal: Positive for back pain. Negative for neck pain and neck stiffness.   Neurological: Positive for headaches. Negative for focal weakness, numbness and paresthesias.       Past Medical History:   Diagnosis Date   • Abdominal pain    • Abdominal swelling    • Bipolar 1 disorder    • Brain tumor     R Frontal Lobe per pt   • Constipation    • Diarrhea    •  Fibromyalgia    • IBS (irritable bowel syndrome)    • Migraine    • Nausea & vomiting    • PONV (postoperative nausea and vomiting)    • PTSD (post-traumatic stress disorder)    • Rectal bleeding        Allergies   Allergen Reactions   • Ativan [Lorazepam] Hallucinations     confusion   • Sulfa Antibiotics Shortness Of Breath and Swelling   • Compazine [Prochlorperazine Edisylate] Hives   • Demerol [Meperidine] Hives   • Droperidol    • Prochlorperazine    • Toradol [Ketorolac Tromethamine] Hives and Itching       Past Surgical History:   Procedure Laterality Date   • ANAL SCOPE N/A 7/28/2016    Procedure: ANAL SCOPE;  Surgeon: Kael Lopez MD;  Location: [facility] OR;  Service:    • APPENDECTOMY     • COLONOSCOPY N/A 6/30/2016    Procedure: COLONOSCOPY  CPTCODE:69016;  Surgeon: Jose Antonio Belle III, MD;  Location: [facility] OR;  Service:    • COLONOSCOPY N/A 7/7/2016    Procedure: COLONOSCOPY (92619) CPT;  Surgeon: Jose Antonio Belle III, MD;  Location: [facility] OR;  Service:    • ENDOSCOPY N/A 6/30/2016    Procedure: ESOPHAGOGASTRODUODENOSCOPY WITH BIOPSY  CPTCODE:05520;  Surgeon: Jose Antonio Belle III, MD;  Location: [facility] OR;  Service:    • HEMORRHOIDECTOMY N/A 7/28/2016    Procedure: HEMORRHOID STAPLING;  Surgeon: Kael Lopez MD;  Location: [facility] OR;  Service:    • HYSTERECTOMY     • KNEE SURGERY     • SHOULDER SURGERY      3 times       Family History   Problem Relation Age of Onset   • Crohn's disease Other    • Hypertension Other    • Diabetes Other    • Irritable bowel syndrome Other        Social History     Social History   • Marital status:      Spouse name: N/A   • Number of children: N/A   • Years of education: N/A     Social History Main Topics   • Smoking status: Former Smoker     Types: Cigarettes     Quit date: 11/1/2015   • Smokeless tobacco: Never Used   • Alcohol use No   • Drug use: Yes     Special: Marijuana   • Sexual activity: Defer     Other Topics Concern   • None     Social  History Narrative           Objective   Physical Exam   Constitutional: She is oriented to person, place, and time. She appears well-developed and well-nourished.   HENT:   Head: Normocephalic.   Right Ear: External ear normal.   Left Ear: External ear normal.   Mouth/Throat: Oropharynx is clear and moist.   Eyes: EOM are normal. Pupils are equal, round, and reactive to light.   Neck: Normal range of motion. Neck supple.   Cardiovascular: Normal rate and regular rhythm.    Pulmonary/Chest: Effort normal and breath sounds normal.   Abdominal: Soft. Bowel sounds are normal.   Musculoskeletal: Normal range of motion.   Neurological: She is alert and oriented to person, place, and time.   Skin: Skin is warm and dry.   Psychiatric: She has a normal mood and affect. Her behavior is normal.   Nursing note and vitals reviewed.      Procedures         ED Course  ED Course                  MDM    Final diagnoses:   Acute nonintractable headache, unspecified headache type            Ja Suarez, APRN  04/23/17 2045

## 2017-04-27 ENCOUNTER — APPOINTMENT (OUTPATIENT)
Dept: PREADMISSION TESTING | Facility: HOSPITAL | Age: 42
End: 2017-04-27

## 2017-04-27 RX ORDER — BUTORPHANOL TARTRATE 10 MG/ML
1 SPRAY, METERED NASAL DAILY
COMMUNITY
End: 2019-01-11

## 2017-04-27 RX ORDER — HYDROCODONE BITARTRATE AND ACETAMINOPHEN 7.5; 325 MG/1; MG/1
1 TABLET ORAL EVERY 6 HOURS PRN
COMMUNITY
End: 2018-03-22 | Stop reason: HOSPADM

## 2017-04-28 ENCOUNTER — ANESTHESIA (OUTPATIENT)
Dept: PERIOP | Facility: HOSPITAL | Age: 42
End: 2017-04-28

## 2017-04-28 ENCOUNTER — HOSPITAL ENCOUNTER (OUTPATIENT)
Facility: HOSPITAL | Age: 42
Discharge: HOME | End: 2017-04-28
Attending: UROLOGY | Admitting: UROLOGY

## 2017-04-28 ENCOUNTER — ANESTHESIA EVENT (OUTPATIENT)
Dept: PERIOP | Facility: HOSPITAL | Age: 42
End: 2017-04-28

## 2017-04-28 ENCOUNTER — APPOINTMENT (OUTPATIENT)
Dept: GENERAL RADIOLOGY | Facility: HOSPITAL | Age: 42
End: 2017-04-28

## 2017-04-28 VITALS
SYSTOLIC BLOOD PRESSURE: 124 MMHG | DIASTOLIC BLOOD PRESSURE: 82 MMHG | RESPIRATION RATE: 16 BRPM | HEIGHT: 69 IN | WEIGHT: 163 LBS | BODY MASS INDEX: 24.14 KG/M2 | OXYGEN SATURATION: 98 % | TEMPERATURE: 98 F | HEART RATE: 79 BPM

## 2017-04-28 DIAGNOSIS — R31.0 GROSS HEMATURIA: ICD-10-CM

## 2017-04-28 PROBLEM — R31.29 MICROSCOPIC HEMATURIA: Status: ACTIVE | Noted: 2017-04-28

## 2017-04-28 PROCEDURE — 25010000002 GENTAMICIN PER 80 MG: Performed by: UROLOGY

## 2017-04-28 PROCEDURE — C1758 CATHETER, URETERAL: HCPCS | Performed by: UROLOGY

## 2017-04-28 PROCEDURE — 25010000002 DEXAMETHASONE PER 1 MG: Performed by: NURSE ANESTHETIST, CERTIFIED REGISTERED

## 2017-04-28 PROCEDURE — 0 IOPAMIDOL 61 % SOLUTION: Performed by: UROLOGY

## 2017-04-28 PROCEDURE — 25010000002 ONDANSETRON PER 1 MG: Performed by: NURSE ANESTHETIST, CERTIFIED REGISTERED

## 2017-04-28 PROCEDURE — 76000 FLUOROSCOPY <1 HR PHYS/QHP: CPT

## 2017-04-28 PROCEDURE — 25010000002 PROPOFOL 10 MG/ML EMULSION: Performed by: NURSE ANESTHETIST, CERTIFIED REGISTERED

## 2017-04-28 PROCEDURE — C1769 GUIDE WIRE: HCPCS | Performed by: UROLOGY

## 2017-04-28 PROCEDURE — 25010000002 MIDAZOLAM PER 1 MG: Performed by: NURSE ANESTHETIST, CERTIFIED REGISTERED

## 2017-04-28 PROCEDURE — 76000 FLUOROSCOPY <1 HR PHYS/QHP: CPT | Performed by: RADIOLOGY

## 2017-04-28 PROCEDURE — 25010000002 FENTANYL CITRATE (PF) 100 MCG/2ML SOLUTION: Performed by: NURSE ANESTHETIST, CERTIFIED REGISTERED

## 2017-04-28 RX ORDER — IPRATROPIUM BROMIDE AND ALBUTEROL SULFATE 2.5; .5 MG/3ML; MG/3ML
3 SOLUTION RESPIRATORY (INHALATION) ONCE AS NEEDED
Status: DISCONTINUED | OUTPATIENT
Start: 2017-04-28 | End: 2017-04-28 | Stop reason: HOSPADM

## 2017-04-28 RX ORDER — ONDANSETRON 2 MG/ML
4 INJECTION INTRAMUSCULAR; INTRAVENOUS ONCE AS NEEDED
Status: DISCONTINUED | OUTPATIENT
Start: 2017-04-28 | End: 2017-04-28 | Stop reason: HOSPADM

## 2017-04-28 RX ORDER — MIDAZOLAM HYDROCHLORIDE 1 MG/ML
INJECTION INTRAMUSCULAR; INTRAVENOUS AS NEEDED
Status: DISCONTINUED | OUTPATIENT
Start: 2017-04-28 | End: 2017-04-28 | Stop reason: SURG

## 2017-04-28 RX ORDER — MIDAZOLAM HYDROCHLORIDE 1 MG/ML
2 INJECTION INTRAMUSCULAR; INTRAVENOUS
Status: DISCONTINUED | OUTPATIENT
Start: 2017-04-28 | End: 2017-04-28 | Stop reason: HOSPADM

## 2017-04-28 RX ORDER — PROPOFOL 10 MG/ML
VIAL (ML) INTRAVENOUS AS NEEDED
Status: DISCONTINUED | OUTPATIENT
Start: 2017-04-28 | End: 2017-04-28 | Stop reason: SURG

## 2017-04-28 RX ORDER — OXYCODONE HYDROCHLORIDE AND ACETAMINOPHEN 5; 325 MG/1; MG/1
1 TABLET ORAL EVERY 6 HOURS PRN
Status: DISCONTINUED | OUTPATIENT
Start: 2017-04-28 | End: 2017-04-28 | Stop reason: HOSPADM

## 2017-04-28 RX ORDER — OXYCODONE HYDROCHLORIDE AND ACETAMINOPHEN 5; 325 MG/1; MG/1
1 TABLET ORAL ONCE AS NEEDED
Status: DISCONTINUED | OUTPATIENT
Start: 2017-04-28 | End: 2017-04-28 | Stop reason: HOSPADM

## 2017-04-28 RX ORDER — FENTANYL CITRATE 50 UG/ML
50 INJECTION, SOLUTION INTRAMUSCULAR; INTRAVENOUS
Status: DISCONTINUED | OUTPATIENT
Start: 2017-04-28 | End: 2017-04-28 | Stop reason: HOSPADM

## 2017-04-28 RX ORDER — MAGNESIUM HYDROXIDE 1200 MG/15ML
LIQUID ORAL AS NEEDED
Status: DISCONTINUED | OUTPATIENT
Start: 2017-04-28 | End: 2017-04-28 | Stop reason: HOSPADM

## 2017-04-28 RX ORDER — SODIUM CHLORIDE, SODIUM LACTATE, POTASSIUM CHLORIDE, CALCIUM CHLORIDE 600; 310; 30; 20 MG/100ML; MG/100ML; MG/100ML; MG/100ML
INJECTION, SOLUTION INTRAVENOUS CONTINUOUS PRN
Status: DISCONTINUED | OUTPATIENT
Start: 2017-04-28 | End: 2017-04-28 | Stop reason: SURG

## 2017-04-28 RX ORDER — FENTANYL CITRATE 50 UG/ML
INJECTION, SOLUTION INTRAMUSCULAR; INTRAVENOUS AS NEEDED
Status: DISCONTINUED | OUTPATIENT
Start: 2017-04-28 | End: 2017-04-28 | Stop reason: SURG

## 2017-04-28 RX ORDER — ONDANSETRON 2 MG/ML
INJECTION INTRAMUSCULAR; INTRAVENOUS AS NEEDED
Status: DISCONTINUED | OUTPATIENT
Start: 2017-04-28 | End: 2017-04-28 | Stop reason: SURG

## 2017-04-28 RX ORDER — SODIUM CHLORIDE, SODIUM LACTATE, POTASSIUM CHLORIDE, CALCIUM CHLORIDE 600; 310; 30; 20 MG/100ML; MG/100ML; MG/100ML; MG/100ML
125 INJECTION, SOLUTION INTRAVENOUS CONTINUOUS
Status: DISCONTINUED | OUTPATIENT
Start: 2017-04-28 | End: 2017-04-28 | Stop reason: HOSPADM

## 2017-04-28 RX ORDER — SODIUM CHLORIDE 0.9 % (FLUSH) 0.9 %
1-10 SYRINGE (ML) INJECTION AS NEEDED
Status: DISCONTINUED | OUTPATIENT
Start: 2017-04-28 | End: 2017-04-28 | Stop reason: HOSPADM

## 2017-04-28 RX ORDER — LIDOCAINE HYDROCHLORIDE 20 MG/ML
INJECTION, SOLUTION INFILTRATION; PERINEURAL AS NEEDED
Status: DISCONTINUED | OUTPATIENT
Start: 2017-04-28 | End: 2017-04-28 | Stop reason: SURG

## 2017-04-28 RX ORDER — DEXAMETHASONE SODIUM PHOSPHATE 4 MG/ML
INJECTION, SOLUTION INTRA-ARTICULAR; INTRALESIONAL; INTRAMUSCULAR; INTRAVENOUS; SOFT TISSUE AS NEEDED
Status: DISCONTINUED | OUTPATIENT
Start: 2017-04-28 | End: 2017-04-28 | Stop reason: SURG

## 2017-04-28 RX ORDER — OXYBUTYNIN CHLORIDE 5 MG/1
5 TABLET ORAL 3 TIMES DAILY
COMMUNITY
End: 2017-05-31 | Stop reason: SDUPTHER

## 2017-04-28 RX ORDER — MIDAZOLAM HYDROCHLORIDE 1 MG/ML
1 INJECTION INTRAMUSCULAR; INTRAVENOUS
Status: DISCONTINUED | OUTPATIENT
Start: 2017-04-28 | End: 2017-04-28 | Stop reason: HOSPADM

## 2017-04-28 RX ORDER — FENTANYL CITRATE 50 UG/ML
100 INJECTION, SOLUTION INTRAMUSCULAR; INTRAVENOUS
Status: DISCONTINUED | OUTPATIENT
Start: 2017-04-28 | End: 2017-04-28 | Stop reason: HOSPADM

## 2017-04-28 RX ADMIN — OXYCODONE HYDROCHLORIDE AND ACETAMINOPHEN 1 TABLET: 5; 325 TABLET ORAL at 14:36

## 2017-04-28 RX ADMIN — FENTANYL CITRATE 100 MCG: 50 INJECTION INTRAMUSCULAR; INTRAVENOUS at 13:08

## 2017-04-28 RX ADMIN — LIDOCAINE HYDROCHLORIDE 60 MG: 20 INJECTION, SOLUTION INFILTRATION; PERINEURAL at 13:11

## 2017-04-28 RX ADMIN — SODIUM CHLORIDE, POTASSIUM CHLORIDE, SODIUM LACTATE AND CALCIUM CHLORIDE: 600; 310; 30; 20 INJECTION, SOLUTION INTRAVENOUS at 13:06

## 2017-04-28 RX ADMIN — DEXAMETHASONE SODIUM PHOSPHATE 4 MG: 4 INJECTION, SOLUTION INTRAMUSCULAR; INTRAVENOUS at 13:29

## 2017-04-28 RX ADMIN — GENTAMICIN SULFATE 80 MG: 40 INJECTION, SOLUTION INTRAMUSCULAR; INTRAVENOUS at 13:08

## 2017-04-28 RX ADMIN — MIDAZOLAM HYDROCHLORIDE 2 MG: 1 INJECTION, SOLUTION INTRAMUSCULAR; INTRAVENOUS at 13:08

## 2017-04-28 RX ADMIN — ONDANSETRON 4 MG: 2 INJECTION, SOLUTION INTRAMUSCULAR; INTRAVENOUS at 13:29

## 2017-04-28 RX ADMIN — MIDAZOLAM HYDROCHLORIDE 2 MG: 1 INJECTION, SOLUTION INTRAMUSCULAR; INTRAVENOUS at 13:10

## 2017-04-28 RX ADMIN — ONDANSETRON 4 MG: 2 INJECTION, SOLUTION INTRAMUSCULAR; INTRAVENOUS at 14:00

## 2017-04-28 RX ADMIN — FENTANYL CITRATE 50 MCG: 50 INJECTION INTRAMUSCULAR; INTRAVENOUS at 14:00

## 2017-04-28 RX ADMIN — PROPOFOL 50 MG: 10 INJECTION, EMULSION INTRAVENOUS at 13:22

## 2017-04-28 RX ADMIN — PROPOFOL 150 MG: 10 INJECTION, EMULSION INTRAVENOUS at 13:11

## 2017-04-28 NOTE — ANESTHESIA PROCEDURE NOTES
Airway  Urgency: elective    Date/Time: 4/28/2017 1:13 PM  End Time:4/28/2017 1:14 PM  Airway not difficult    General Information and Staff    Patient location during procedure: OR  Anesthesiologist: BENNETT ROBERTS  CRNA: JOEL PITTMAN    Indications and Patient Condition  Indications for airway management: airway protection    Preoxygenated: yes  MILS maintained throughout  Mask difficulty assessment: 0 - not attempted    Final Airway Details  Final airway type: supraglottic airway      Successful airway: unique  Size 4  Airway Seal Pressure (cm H2O): 20    Number of attempts at approach: 1    Additional Comments  Atraumatic

## 2017-04-28 NOTE — ANESTHESIA PREPROCEDURE EVALUATION
Anesthesia Evaluation     Patient summary reviewed and Nursing notes reviewed   history of anesthetic complications: PONV  NPO Status: > 8 hours   Airway   Mallampati: II  TM distance: >3 FB  Neck ROM: full  no difficulty expected  Dental    (+) poor dentition    Pulmonary - negative pulmonary ROS and normal exam   Cardiovascular - negative cardio ROS and normal exam  Exercise tolerance: good (4-7 METS)    NYHA Classification: I        Neuro/Psych  (+) headaches, psychiatric history Anxiety,    GI/Hepatic/Renal/Endo - negative ROS     Musculoskeletal (-) negative ROS    Abdominal  - normal exam    Bowel sounds: normal.   Substance History - negative use     OB/GYN negative ob/gyn ROS         Other - negative ROS                                     Anesthesia Plan    ASA 3     general     intravenous induction   Anesthetic plan and risks discussed with patient.  Use of blood products discussed with patient  Consented to blood products.    Anesthesia Evaluation     Patient summary reviewed and Nursing notes reviewed    History of anesthetic complications   Airway   Mallampati: I  TM distance: >3 FB  Neck ROM: full  no difficulty expected  Dental - normal exam     Pulmonary - negative pulmonary ROS and normal exam   Cardiovascular - negative cardio ROS and normal exam  Exercise tolerance: good (4-7 METS)    NYHA Classification: I    Neuro/Psych   (+) headaches, psychiatric history,    GI/Hepatic/Renal/Endo - negative ROS     Musculoskeletal (-) negative ROS    Abdominal  - normal exam    Bowel sounds: normal.   Substance History - negative use     OB/GYN negative ob/gyn ROS         Other - negative ROS                              Anesthesia Plan    ASA 3     general     intravenous induction   Anesthetic plan and risks discussed with patient.  Use of blood products discussed with patient whom consented to blood products.

## 2017-04-28 NOTE — ANESTHESIA POSTPROCEDURE EVALUATION
Patient: Susanna Long    Procedure Summary     Date Anesthesia Start Anesthesia Stop Room / Location    04/28/17 1308 1340 [facility] OR 06 / [facility]       Procedure Diagnosis Surgeon Provider    CYSTOSCOPY RETROGRADE PYELOGRAM (Bilateral ) Gross hematuria  (Gross hematuria [R31.0]) MD Marty Blanchard MD          Anesthesia Type: general  Last vitals  /83 (04/28/17 1411)    Temp 97.8 °F (36.6 °C) (04/28/17 1411)    Pulse 78 (04/28/17 1411)   Resp 16 (04/28/17 1411)    SpO2 94 % (04/28/17 1411)      Post Anesthesia Care and Evaluation    Patient location during evaluation: bedside  Patient participation: complete - patient participated  Level of consciousness: awake and alert  Pain score: 1  Pain management: adequate  Airway patency: patent  Anesthetic complications: No anesthetic complications  PONV Status: none  Cardiovascular status: acceptable  Respiratory status: acceptable  Hydration status: acceptable

## 2017-05-11 ENCOUNTER — OFFICE VISIT (OUTPATIENT)
Dept: UROLOGY | Facility: CLINIC | Age: 42
End: 2017-05-11

## 2017-05-11 DIAGNOSIS — N23 KIDNEY PAIN: ICD-10-CM

## 2017-05-11 DIAGNOSIS — R11.2 NON-INTRACTABLE VOMITING WITH NAUSEA, UNSPECIFIED VOMITING TYPE: ICD-10-CM

## 2017-05-11 DIAGNOSIS — Z87.898 HISTORY OF GROSS HEMATURIA: Primary | ICD-10-CM

## 2017-05-11 DIAGNOSIS — R31.9 HEMATURIA: ICD-10-CM

## 2017-05-11 LAB
BILIRUB BLD-MCNC: NEGATIVE MG/DL
CLARITY, POC: CLEAR
COLOR UR: ABNORMAL
GLUCOSE UR STRIP-MCNC: NEGATIVE MG/DL
KETONES UR QL: NEGATIVE
LEUKOCYTE EST, POC: NEGATIVE
NITRITE UR-MCNC: NEGATIVE MG/ML
PH UR: 7 [PH] (ref 5–8)
PROT UR STRIP-MCNC: ABNORMAL MG/DL
RBC # UR STRIP: ABNORMAL /UL
SP GR UR: 1.01 (ref 1–1.03)
UROBILINOGEN UR QL: NORMAL

## 2017-05-11 PROCEDURE — 81003 URINALYSIS AUTO W/O SCOPE: CPT | Performed by: NURSE PRACTITIONER

## 2017-05-11 PROCEDURE — 99213 OFFICE O/P EST LOW 20 MIN: CPT | Performed by: NURSE PRACTITIONER

## 2017-05-11 RX ORDER — HYDROCODONE BITARTRATE AND ACETAMINOPHEN 5; 325 MG/1; MG/1
1-2 TABLET ORAL EVERY 6 HOURS PRN
Qty: 40 TABLET | Refills: 0 | Status: SHIPPED | OUTPATIENT
Start: 2017-05-11 | End: 2017-06-22

## 2017-05-11 RX ORDER — PROMETHAZINE HYDROCHLORIDE 25 MG/1
25 TABLET ORAL EVERY 8 HOURS PRN
Qty: 30 TABLET | Refills: 0 | Status: SHIPPED | OUTPATIENT
Start: 2017-05-11 | End: 2017-07-27 | Stop reason: SDUPTHER

## 2017-05-18 ENCOUNTER — OFFICE VISIT (OUTPATIENT)
Dept: UROLOGY | Facility: CLINIC | Age: 42
End: 2017-05-18

## 2017-05-18 DIAGNOSIS — R31.0 GROSS HEMATURIA: ICD-10-CM

## 2017-05-18 DIAGNOSIS — M54.50 MIDLINE LOW BACK PAIN WITHOUT SCIATICA, UNSPECIFIED CHRONICITY: ICD-10-CM

## 2017-05-18 DIAGNOSIS — N02.8 IGA NEPHROPATHY: ICD-10-CM

## 2017-05-18 DIAGNOSIS — R31.29 MICROSCOPIC HEMATURIA: Primary | ICD-10-CM

## 2017-05-18 DIAGNOSIS — R39.14 FEELING OF INCOMPLETE BLADDER EMPTYING: ICD-10-CM

## 2017-05-18 PROCEDURE — 99213 OFFICE O/P EST LOW 20 MIN: CPT | Performed by: NURSE PRACTITIONER

## 2017-05-18 PROCEDURE — 51798 US URINE CAPACITY MEASURE: CPT | Performed by: NURSE PRACTITIONER

## 2017-05-22 ENCOUNTER — HOSPITAL ENCOUNTER (EMERGENCY)
Facility: HOSPITAL | Age: 42
Discharge: HOME | End: 2017-05-22
Attending: EMERGENCY MEDICINE | Admitting: EMERGENCY MEDICINE

## 2017-05-22 VITALS
WEIGHT: 168 LBS | SYSTOLIC BLOOD PRESSURE: 110 MMHG | BODY MASS INDEX: 24.88 KG/M2 | RESPIRATION RATE: 16 BRPM | OXYGEN SATURATION: 99 % | HEIGHT: 69 IN | DIASTOLIC BLOOD PRESSURE: 70 MMHG | HEART RATE: 78 BPM | TEMPERATURE: 98 F

## 2017-05-22 DIAGNOSIS — G43.719 INTRACTABLE CHRONIC MIGRAINE WITHOUT AURA AND WITHOUT STATUS MIGRAINOSUS: Primary | ICD-10-CM

## 2017-05-22 LAB
6-ACETYL MORPHINE: NEGATIVE
ALBUMIN SERPL-MCNC: 4.5 G/DL (ref 3.5–5)
ALBUMIN/GLOB SERPL: 1.3 G/DL (ref 1.5–2.5)
ALP SERPL-CCNC: 60 U/L (ref 35–104)
ALT SERPL W P-5'-P-CCNC: 46 U/L (ref 10–36)
AMPHET+METHAMPHET UR QL: NEGATIVE
ANION GAP SERPL CALCULATED.3IONS-SCNC: 5.5 MMOL/L (ref 3.6–11.2)
AST SERPL-CCNC: 60 U/L (ref 10–30)
BACTERIA UR QL AUTO: ABNORMAL /HPF
BARBITURATES UR QL SCN: NEGATIVE
BASOPHILS # BLD AUTO: 0.01 10*3/MM3 (ref 0–0.3)
BASOPHILS NFR BLD AUTO: 0.1 % (ref 0–2)
BENZODIAZ UR QL SCN: NEGATIVE
BILIRUB SERPL-MCNC: 0.8 MG/DL (ref 0.2–1.8)
BILIRUB UR QL STRIP: NEGATIVE
BUN BLD-MCNC: 13 MG/DL (ref 7–21)
BUN/CREAT SERPL: 18.8 (ref 7–25)
BUPRENORPHINE SERPL-MCNC: NEGATIVE NG/ML
CALCIUM SPEC-SCNC: 9.1 MG/DL (ref 7.7–10)
CANNABINOIDS SERPL QL: POSITIVE
CHLORIDE SERPL-SCNC: 104 MMOL/L (ref 99–112)
CLARITY UR: CLEAR
CO2 SERPL-SCNC: 27.5 MMOL/L (ref 24.3–31.9)
COCAINE UR QL: NEGATIVE
COLOR UR: YELLOW
CREAT BLD-MCNC: 0.69 MG/DL (ref 0.43–1.29)
DEPRECATED RDW RBC AUTO: 45.3 FL (ref 37–54)
EOSINOPHIL # BLD AUTO: 0.07 10*3/MM3 (ref 0–0.7)
EOSINOPHIL NFR BLD AUTO: 0.9 % (ref 0–5)
ERYTHROCYTE [DISTWIDTH] IN BLOOD BY AUTOMATED COUNT: 13.8 % (ref 11.5–14.5)
GFR SERPL CREATININE-BSD FRML MDRD: 93 ML/MIN/1.73
GLOBULIN UR ELPH-MCNC: 3.5 GM/DL
GLUCOSE BLD-MCNC: 84 MG/DL (ref 70–110)
GLUCOSE UR STRIP-MCNC: NEGATIVE MG/DL
HCT VFR BLD AUTO: 43.2 % (ref 37–47)
HGB BLD-MCNC: 14.5 G/DL (ref 12–16)
HGB UR QL STRIP.AUTO: NEGATIVE
HYALINE CASTS UR QL AUTO: ABNORMAL /LPF
IMM GRANULOCYTES # BLD: 0.02 10*3/MM3 (ref 0–0.03)
IMM GRANULOCYTES NFR BLD: 0.3 % (ref 0–0.5)
KETONES UR QL STRIP: ABNORMAL
LEUKOCYTE ESTERASE UR QL STRIP.AUTO: ABNORMAL
LYMPHOCYTES # BLD AUTO: 3.58 10*3/MM3 (ref 1–3)
LYMPHOCYTES NFR BLD AUTO: 46 % (ref 21–51)
MCH RBC QN AUTO: 30.8 PG (ref 27–33)
MCHC RBC AUTO-ENTMCNC: 33.6 G/DL (ref 33–37)
MCV RBC AUTO: 91.7 FL (ref 80–94)
MDMA UR QL SCN: NEGATIVE
METHADONE UR QL SCN: NEGATIVE
MONOCYTES # BLD AUTO: 0.65 10*3/MM3 (ref 0.1–0.9)
MONOCYTES NFR BLD AUTO: 8.3 % (ref 0–10)
NEUTROPHILS # BLD AUTO: 3.46 10*3/MM3 (ref 1.4–6.5)
NEUTROPHILS NFR BLD AUTO: 44.4 % (ref 30–70)
NITRITE UR QL STRIP: NEGATIVE
OPIATES UR QL: NEGATIVE
OSMOLALITY SERPL CALC.SUM OF ELEC: 273.1 MOSM/KG (ref 273–305)
OXYCODONE UR QL SCN: NEGATIVE
PCP UR QL SCN: NEGATIVE
PH UR STRIP.AUTO: 5.5 [PH] (ref 5–8)
PLATELET # BLD AUTO: 143 10*3/MM3 (ref 130–400)
PMV BLD AUTO: 10.5 FL (ref 6–10)
POTASSIUM BLD-SCNC: 4.1 MMOL/L (ref 3.5–5.3)
PROT SERPL-MCNC: 8 G/DL (ref 6–8)
PROT UR QL STRIP: NEGATIVE
RBC # BLD AUTO: 4.71 10*6/MM3 (ref 4.2–5.4)
RBC # UR: ABNORMAL /HPF
REF LAB TEST METHOD: ABNORMAL
SODIUM BLD-SCNC: 137 MMOL/L (ref 135–153)
SP GR UR STRIP: 1.02 (ref 1–1.03)
SQUAMOUS #/AREA URNS HPF: ABNORMAL /HPF
UROBILINOGEN UR QL STRIP: ABNORMAL
WBC NRBC COR # BLD: 7.79 10*3/MM3 (ref 4.5–12.5)
WBC UR QL AUTO: ABNORMAL /HPF

## 2017-05-22 PROCEDURE — 25010000002 BUTORPHANOL PER 1 MG: Performed by: PHYSICIAN ASSISTANT

## 2017-05-22 PROCEDURE — 80307 DRUG TEST PRSMV CHEM ANLYZR: CPT | Performed by: PHYSICIAN ASSISTANT

## 2017-05-22 PROCEDURE — 81001 URINALYSIS AUTO W/SCOPE: CPT | Performed by: PHYSICIAN ASSISTANT

## 2017-05-22 PROCEDURE — 96372 THER/PROPH/DIAG INJ SC/IM: CPT

## 2017-05-22 PROCEDURE — 96365 THER/PROPH/DIAG IV INF INIT: CPT

## 2017-05-22 PROCEDURE — 25010000002 ORPHENADRINE CITRATE PER 60 MG: Performed by: PHYSICIAN ASSISTANT

## 2017-05-22 PROCEDURE — 96361 HYDRATE IV INFUSION ADD-ON: CPT

## 2017-05-22 PROCEDURE — 80053 COMPREHEN METABOLIC PANEL: CPT | Performed by: PHYSICIAN ASSISTANT

## 2017-05-22 PROCEDURE — 85025 COMPLETE CBC W/AUTO DIFF WBC: CPT | Performed by: PHYSICIAN ASSISTANT

## 2017-05-22 PROCEDURE — 96375 TX/PRO/DX INJ NEW DRUG ADDON: CPT

## 2017-05-22 PROCEDURE — 99283 EMERGENCY DEPT VISIT LOW MDM: CPT

## 2017-05-22 PROCEDURE — 25010000002 DIPHENHYDRAMINE PER 50 MG: Performed by: PHYSICIAN ASSISTANT

## 2017-05-22 PROCEDURE — 25010000002 PROMETHAZINE PER 50 MG: Performed by: PHYSICIAN ASSISTANT

## 2017-05-22 RX ORDER — ORPHENADRINE CITRATE 30 MG/ML
60 INJECTION INTRAMUSCULAR; INTRAVENOUS ONCE
Status: COMPLETED | OUTPATIENT
Start: 2017-05-22 | End: 2017-05-22

## 2017-05-22 RX ORDER — SODIUM CHLORIDE 0.9 % (FLUSH) 0.9 %
10 SYRINGE (ML) INJECTION AS NEEDED
Status: DISCONTINUED | OUTPATIENT
Start: 2017-05-22 | End: 2017-05-22 | Stop reason: HOSPADM

## 2017-05-22 RX ORDER — DIPHENHYDRAMINE HYDROCHLORIDE 50 MG/ML
25 INJECTION INTRAMUSCULAR; INTRAVENOUS ONCE
Status: COMPLETED | OUTPATIENT
Start: 2017-05-22 | End: 2017-05-22

## 2017-05-22 RX ORDER — PROMETHAZINE HYDROCHLORIDE 25 MG/1
25 TABLET ORAL EVERY 6 HOURS PRN
Qty: 30 TABLET | Refills: 0 | Status: ON HOLD | OUTPATIENT
Start: 2017-05-22 | End: 2018-06-06

## 2017-05-22 RX ADMIN — BUTORPHANOL TARTRATE 2 MG: 2 INJECTION, SOLUTION INTRAMUSCULAR; INTRAVENOUS at 20:31

## 2017-05-22 RX ADMIN — SODIUM CHLORIDE 1000 ML: 9 INJECTION, SOLUTION INTRAVENOUS at 20:20

## 2017-05-22 RX ADMIN — ORPHENADRINE CITRATE 60 MG: 30 INJECTION INTRAMUSCULAR; INTRAVENOUS at 21:47

## 2017-05-22 RX ADMIN — DIPHENHYDRAMINE HYDROCHLORIDE 25 MG: 50 INJECTION INTRAMUSCULAR; INTRAVENOUS at 20:20

## 2017-05-22 RX ADMIN — PROMETHAZINE HYDROCHLORIDE 25 MG: 25 INJECTION INTRAMUSCULAR; INTRAVENOUS at 20:20

## 2017-05-24 ENCOUNTER — HOSPITAL ENCOUNTER (EMERGENCY)
Facility: HOSPITAL | Age: 42
Discharge: HOME | End: 2017-05-24
Attending: EMERGENCY MEDICINE | Admitting: EMERGENCY MEDICINE

## 2017-05-24 VITALS
HEIGHT: 69 IN | DIASTOLIC BLOOD PRESSURE: 74 MMHG | BODY MASS INDEX: 25.18 KG/M2 | RESPIRATION RATE: 16 BRPM | TEMPERATURE: 97.8 F | SYSTOLIC BLOOD PRESSURE: 118 MMHG | OXYGEN SATURATION: 99 % | WEIGHT: 170 LBS | HEART RATE: 68 BPM

## 2017-05-24 DIAGNOSIS — G43.111 INTRACTABLE MIGRAINE WITH AURA WITH STATUS MIGRAINOSUS: Primary | ICD-10-CM

## 2017-05-24 LAB
ALBUMIN SERPL-MCNC: 4.4 G/DL (ref 3.5–5)
ALBUMIN/GLOB SERPL: 1.3 G/DL (ref 1.5–2.5)
ALP SERPL-CCNC: 63 U/L (ref 35–104)
ALT SERPL W P-5'-P-CCNC: 63 U/L (ref 10–36)
ANION GAP SERPL CALCULATED.3IONS-SCNC: 4.5 MMOL/L (ref 3.6–11.2)
AST SERPL-CCNC: 66 U/L (ref 10–30)
BASOPHILS # BLD AUTO: 0.01 10*3/MM3 (ref 0–0.3)
BASOPHILS NFR BLD AUTO: 0.1 % (ref 0–2)
BILIRUB SERPL-MCNC: 0.8 MG/DL (ref 0.2–1.8)
BUN BLD-MCNC: 9 MG/DL (ref 7–21)
BUN/CREAT SERPL: 14.8 (ref 7–25)
CALCIUM SPEC-SCNC: 9.5 MG/DL (ref 7.7–10)
CHLORIDE SERPL-SCNC: 105 MMOL/L (ref 99–112)
CO2 SERPL-SCNC: 29.5 MMOL/L (ref 24.3–31.9)
CREAT BLD-MCNC: 0.61 MG/DL (ref 0.43–1.29)
DEPRECATED RDW RBC AUTO: 44.1 FL (ref 37–54)
EOSINOPHIL # BLD AUTO: 0.04 10*3/MM3 (ref 0–0.7)
EOSINOPHIL NFR BLD AUTO: 0.6 % (ref 0–5)
ERYTHROCYTE [DISTWIDTH] IN BLOOD BY AUTOMATED COUNT: 13.4 % (ref 11.5–14.5)
GFR SERPL CREATININE-BSD FRML MDRD: 108 ML/MIN/1.73
GLOBULIN UR ELPH-MCNC: 3.4 GM/DL
GLUCOSE BLD-MCNC: 79 MG/DL (ref 70–110)
HCT VFR BLD AUTO: 41.8 % (ref 37–47)
HGB BLD-MCNC: 14.2 G/DL (ref 12–16)
IMM GRANULOCYTES # BLD: 0.01 10*3/MM3 (ref 0–0.03)
IMM GRANULOCYTES NFR BLD: 0.1 % (ref 0–0.5)
LYMPHOCYTES # BLD AUTO: 2.32 10*3/MM3 (ref 1–3)
LYMPHOCYTES NFR BLD AUTO: 34.3 % (ref 21–51)
MCH RBC QN AUTO: 31.1 PG (ref 27–33)
MCHC RBC AUTO-ENTMCNC: 34 G/DL (ref 33–37)
MCV RBC AUTO: 91.5 FL (ref 80–94)
MONOCYTES # BLD AUTO: 0.42 10*3/MM3 (ref 0.1–0.9)
MONOCYTES NFR BLD AUTO: 6.2 % (ref 0–10)
NEUTROPHILS # BLD AUTO: 3.97 10*3/MM3 (ref 1.4–6.5)
NEUTROPHILS NFR BLD AUTO: 58.7 % (ref 30–70)
OSMOLALITY SERPL CALC.SUM OF ELEC: 275.1 MOSM/KG (ref 273–305)
PLATELET # BLD AUTO: 114 10*3/MM3 (ref 130–400)
PMV BLD AUTO: 10.6 FL (ref 6–10)
POTASSIUM BLD-SCNC: 3.7 MMOL/L (ref 3.5–5.3)
PROT SERPL-MCNC: 7.8 G/DL (ref 6–8)
RBC # BLD AUTO: 4.57 10*6/MM3 (ref 4.2–5.4)
SODIUM BLD-SCNC: 139 MMOL/L (ref 135–153)
VALPROATE SERPL-MCNC: 70.9 MCG/ML (ref 50–100)
WBC NRBC COR # BLD: 6.77 10*3/MM3 (ref 4.5–12.5)

## 2017-05-24 PROCEDURE — 99284 EMERGENCY DEPT VISIT MOD MDM: CPT

## 2017-05-24 PROCEDURE — 80053 COMPREHEN METABOLIC PANEL: CPT | Performed by: EMERGENCY MEDICINE

## 2017-05-24 PROCEDURE — 25010000002 BUTORPHANOL PER 1 MG: Performed by: EMERGENCY MEDICINE

## 2017-05-24 PROCEDURE — 85025 COMPLETE CBC W/AUTO DIFF WBC: CPT | Performed by: EMERGENCY MEDICINE

## 2017-05-24 PROCEDURE — 96361 HYDRATE IV INFUSION ADD-ON: CPT

## 2017-05-24 PROCEDURE — 25010000002 DEXAMETHASONE PER 1 MG: Performed by: EMERGENCY MEDICINE

## 2017-05-24 PROCEDURE — 80164 ASSAY DIPROPYLACETIC ACD TOT: CPT | Performed by: EMERGENCY MEDICINE

## 2017-05-24 PROCEDURE — 96374 THER/PROPH/DIAG INJ IV PUSH: CPT

## 2017-05-24 PROCEDURE — 96372 THER/PROPH/DIAG INJ SC/IM: CPT

## 2017-05-24 PROCEDURE — 25010000002 PROMETHAZINE PER 50 MG: Performed by: EMERGENCY MEDICINE

## 2017-05-24 PROCEDURE — 96376 TX/PRO/DX INJ SAME DRUG ADON: CPT

## 2017-05-24 PROCEDURE — 96375 TX/PRO/DX INJ NEW DRUG ADDON: CPT

## 2017-05-24 RX ORDER — SUMATRIPTAN 6 MG/.5ML
6 INJECTION, SOLUTION SUBCUTANEOUS ONCE
Status: COMPLETED | OUTPATIENT
Start: 2017-05-24 | End: 2017-05-24

## 2017-05-24 RX ORDER — PROMETHAZINE HYDROCHLORIDE 25 MG/ML
12.5 INJECTION, SOLUTION INTRAMUSCULAR; INTRAVENOUS ONCE
Status: COMPLETED | OUTPATIENT
Start: 2017-05-24 | End: 2017-05-24

## 2017-05-24 RX ORDER — SODIUM CHLORIDE 9 MG/ML
INJECTION, SOLUTION INTRAVENOUS
Status: COMPLETED
Start: 2017-05-24 | End: 2017-05-24

## 2017-05-24 RX ORDER — ONDANSETRON 2 MG/ML
8 INJECTION INTRAMUSCULAR; INTRAVENOUS ONCE
Status: DISCONTINUED | OUTPATIENT
Start: 2017-05-24 | End: 2017-05-24

## 2017-05-24 RX ORDER — PROMETHAZINE HYDROCHLORIDE 25 MG/ML
12.5 INJECTION, SOLUTION INTRAMUSCULAR; INTRAVENOUS EVERY 6 HOURS PRN
Status: DISCONTINUED | OUTPATIENT
Start: 2017-05-24 | End: 2017-05-24 | Stop reason: HOSPADM

## 2017-05-24 RX ORDER — METOCLOPRAMIDE HYDROCHLORIDE 5 MG/ML
10 INJECTION INTRAMUSCULAR; INTRAVENOUS ONCE
Status: DISCONTINUED | OUTPATIENT
Start: 2017-05-24 | End: 2017-05-24

## 2017-05-24 RX ADMIN — BUTORPHANOL TARTRATE 2 MG: 2 INJECTION, SOLUTION INTRAMUSCULAR; INTRAVENOUS at 15:46

## 2017-05-24 RX ADMIN — DEXAMETHASONE SODIUM PHOSPHATE 10 MG: 4 INJECTION, SOLUTION INTRAMUSCULAR; INTRAVENOUS at 16:16

## 2017-05-24 RX ADMIN — SODIUM CHLORIDE 1000 ML: 9 INJECTION, SOLUTION INTRAVENOUS at 15:51

## 2017-05-24 RX ADMIN — SODIUM CHLORIDE 50 ML: 9 INJECTION, SOLUTION INTRAVENOUS at 15:50

## 2017-05-24 RX ADMIN — SUMATRIPTAN 6 MG: 6 INJECTION SUBCUTANEOUS at 18:23

## 2017-05-24 RX ADMIN — BUTORPHANOL TARTRATE 2 MG: 2 INJECTION, SOLUTION INTRAMUSCULAR; INTRAVENOUS at 17:00

## 2017-05-24 RX ADMIN — PROMETHAZINE HYDROCHLORIDE 12.5 MG: 25 INJECTION INTRAMUSCULAR; INTRAVENOUS at 15:50

## 2017-05-24 RX ADMIN — PROMETHAZINE HYDROCHLORIDE 12.5 MG: 25 INJECTION INTRAMUSCULAR; INTRAVENOUS at 18:26

## 2017-05-28 ENCOUNTER — APPOINTMENT (OUTPATIENT)
Dept: GENERAL RADIOLOGY | Facility: HOSPITAL | Age: 42
End: 2017-05-28

## 2017-05-28 ENCOUNTER — HOSPITAL ENCOUNTER (EMERGENCY)
Facility: HOSPITAL | Age: 42
Discharge: HOME | End: 2017-05-28
Attending: EMERGENCY MEDICINE | Admitting: EMERGENCY MEDICINE

## 2017-05-28 ENCOUNTER — APPOINTMENT (OUTPATIENT)
Dept: CT IMAGING | Facility: HOSPITAL | Age: 42
End: 2017-05-28

## 2017-05-28 DIAGNOSIS — G89.29 CHRONIC NONINTRACTABLE HEADACHE, UNSPECIFIED HEADACHE TYPE: Primary | ICD-10-CM

## 2017-05-28 DIAGNOSIS — R11.2 NON-INTRACTABLE VOMITING WITH NAUSEA, UNSPECIFIED VOMITING TYPE: ICD-10-CM

## 2017-05-28 DIAGNOSIS — R07.9 CHEST PAIN IN ADULT: ICD-10-CM

## 2017-05-28 DIAGNOSIS — R51.9 CHRONIC NONINTRACTABLE HEADACHE, UNSPECIFIED HEADACHE TYPE: Primary | ICD-10-CM

## 2017-05-28 LAB
6-ACETYL MORPHINE: NEGATIVE
ALBUMIN SERPL-MCNC: 4 G/DL (ref 3.5–5)
ALBUMIN/GLOB SERPL: 1.3 G/DL (ref 1.5–2.5)
ALP SERPL-CCNC: 59 U/L (ref 35–104)
ALT SERPL W P-5'-P-CCNC: 59 U/L (ref 10–36)
AMPHET+METHAMPHET UR QL: NEGATIVE
AMYLASE SERPL-CCNC: 50 U/L (ref 28–100)
ANION GAP SERPL CALCULATED.3IONS-SCNC: 7.7 MMOL/L (ref 3.6–11.2)
APTT PPP: 26.2 SECONDS (ref 24.4–31)
AST SERPL-CCNC: 58 U/L (ref 10–30)
BACTERIA UR QL AUTO: ABNORMAL /HPF
BARBITURATES UR QL SCN: NEGATIVE
BASOPHILS # BLD AUTO: 0 10*3/MM3 (ref 0–0.3)
BASOPHILS NFR BLD AUTO: 0 % (ref 0–2)
BENZODIAZ UR QL SCN: NEGATIVE
BILIRUB SERPL-MCNC: 0.8 MG/DL (ref 0.2–1.8)
BILIRUB UR QL STRIP: NEGATIVE
BNP SERPL-MCNC: 7 PG/ML (ref 0–100)
BUN BLD-MCNC: 12 MG/DL (ref 7–21)
BUN/CREAT SERPL: 18.2 (ref 7–25)
BUPRENORPHINE SERPL-MCNC: NEGATIVE NG/ML
CALCIUM SPEC-SCNC: 9.3 MG/DL (ref 7.7–10)
CANNABINOIDS SERPL QL: POSITIVE
CHLORIDE SERPL-SCNC: 107 MMOL/L (ref 99–112)
CK MB SERPL-CCNC: <0.18 NG/ML (ref 0–5)
CK MB SERPL-RTO: NORMAL % (ref 0–3)
CK SERPL-CCNC: 32 U/L (ref 24–173)
CLARITY UR: ABNORMAL
CO2 SERPL-SCNC: 29.3 MMOL/L (ref 24.3–31.9)
COCAINE UR QL: NEGATIVE
COLOR UR: ABNORMAL
CREAT BLD-MCNC: 0.66 MG/DL (ref 0.43–1.29)
CRP SERPL-MCNC: <0.5 MG/DL (ref 0–0.99)
D DIMER PPP FEU-MCNC: 0.22 MG/L (FEU) (ref 0–0.5)
DEPRECATED RDW RBC AUTO: 46.5 FL (ref 37–54)
EOSINOPHIL # BLD AUTO: 0.05 10*3/MM3 (ref 0–0.7)
EOSINOPHIL NFR BLD AUTO: 1 % (ref 0–5)
ERYTHROCYTE [DISTWIDTH] IN BLOOD BY AUTOMATED COUNT: 13.7 % (ref 11.5–14.5)
ERYTHROCYTE [SEDIMENTATION RATE] IN BLOOD: 7 MM/HR (ref 0–20)
GFR SERPL CREATININE-BSD FRML MDRD: 98 ML/MIN/1.73
GLOBULIN UR ELPH-MCNC: 3.2 GM/DL
GLUCOSE BLD-MCNC: 94 MG/DL (ref 70–110)
GLUCOSE UR STRIP-MCNC: NEGATIVE MG/DL
HCT VFR BLD AUTO: 43.5 % (ref 37–47)
HGB BLD-MCNC: 14.5 G/DL (ref 12–16)
HGB UR QL STRIP.AUTO: NEGATIVE
HOLD SPECIMEN: NORMAL
HOLD SPECIMEN: NORMAL
IMM GRANULOCYTES # BLD: 0 10*3/MM3 (ref 0–0.03)
IMM GRANULOCYTES NFR BLD: 0 % (ref 0–0.5)
INR PPP: 0.97 (ref 0.8–1.1)
KETONES UR QL STRIP: ABNORMAL
LEUKOCYTE ESTERASE UR QL STRIP.AUTO: ABNORMAL
LIPASE SERPL-CCNC: 41 U/L (ref 13–60)
LYMPHOCYTES # BLD AUTO: 2.03 10*3/MM3 (ref 1–3)
LYMPHOCYTES NFR BLD AUTO: 41.7 % (ref 21–51)
MAGNESIUM SERPL-MCNC: 1.9 MG/DL (ref 1.7–2.6)
MCH RBC QN AUTO: 31 PG (ref 27–33)
MCHC RBC AUTO-ENTMCNC: 33.3 G/DL (ref 33–37)
MCV RBC AUTO: 92.9 FL (ref 80–94)
MDMA UR QL SCN: NEGATIVE
METHADONE UR QL SCN: NEGATIVE
MONOCYTES # BLD AUTO: 0.31 10*3/MM3 (ref 0.1–0.9)
MONOCYTES NFR BLD AUTO: 6.4 % (ref 0–10)
MYOGLOBIN SERPL-MCNC: 20 NG/ML (ref 0–109)
NEUTROPHILS # BLD AUTO: 2.48 10*3/MM3 (ref 1.4–6.5)
NEUTROPHILS NFR BLD AUTO: 50.9 % (ref 30–70)
NITRITE UR QL STRIP: NEGATIVE
OPIATES UR QL: NEGATIVE
OSMOLALITY SERPL CALC.SUM OF ELEC: 286.3 MOSM/KG (ref 273–305)
OXYCODONE UR QL SCN: NEGATIVE
PCP UR QL SCN: NEGATIVE
PH UR STRIP.AUTO: 7.5 [PH] (ref 5–8)
PLATELET # BLD AUTO: 121 10*3/MM3 (ref 130–400)
PMV BLD AUTO: 10.5 FL (ref 6–10)
POTASSIUM BLD-SCNC: 3.7 MMOL/L (ref 3.5–5.3)
PROT SERPL-MCNC: 7.2 G/DL (ref 6–8)
PROT UR QL STRIP: NEGATIVE
PROTHROMBIN TIME: 10.8 SECONDS (ref 9.8–11.9)
RBC # BLD AUTO: 4.68 10*6/MM3 (ref 4.2–5.4)
RBC # UR: ABNORMAL /HPF
REF LAB TEST METHOD: ABNORMAL
SODIUM BLD-SCNC: 144 MMOL/L (ref 135–153)
SP GR UR STRIP: 1.02 (ref 1–1.03)
SQUAMOUS #/AREA URNS HPF: ABNORMAL /HPF
TRANS CELLS #/AREA URNS HPF: ABNORMAL /HPF
TROPONIN I SERPL-MCNC: <0.006 NG/ML
UROBILINOGEN UR QL STRIP: ABNORMAL
VALPROATE SERPL-MCNC: 75.2 MCG/ML (ref 50–100)
WBC NRBC COR # BLD: 4.87 10*3/MM3 (ref 4.5–12.5)
WBC UR QL AUTO: ABNORMAL /HPF
WHOLE BLOOD HOLD SPECIMEN: NORMAL
WHOLE BLOOD HOLD SPECIMEN: NORMAL

## 2017-05-28 PROCEDURE — 96376 TX/PRO/DX INJ SAME DRUG ADON: CPT

## 2017-05-28 PROCEDURE — 84484 ASSAY OF TROPONIN QUANT: CPT | Performed by: PHYSICIAN ASSISTANT

## 2017-05-28 PROCEDURE — 85610 PROTHROMBIN TIME: CPT | Performed by: PHYSICIAN ASSISTANT

## 2017-05-28 PROCEDURE — 80307 DRUG TEST PRSMV CHEM ANLYZR: CPT | Performed by: PHYSICIAN ASSISTANT

## 2017-05-28 PROCEDURE — 82150 ASSAY OF AMYLASE: CPT | Performed by: PHYSICIAN ASSISTANT

## 2017-05-28 PROCEDURE — 70450 CT HEAD/BRAIN W/O DYE: CPT | Performed by: RADIOLOGY

## 2017-05-28 PROCEDURE — 85025 COMPLETE CBC W/AUTO DIFF WBC: CPT | Performed by: PHYSICIAN ASSISTANT

## 2017-05-28 PROCEDURE — 25010000002 HYDROMORPHONE PER 4 MG: Performed by: EMERGENCY MEDICINE

## 2017-05-28 PROCEDURE — 85652 RBC SED RATE AUTOMATED: CPT | Performed by: PHYSICIAN ASSISTANT

## 2017-05-28 PROCEDURE — 86140 C-REACTIVE PROTEIN: CPT | Performed by: PHYSICIAN ASSISTANT

## 2017-05-28 PROCEDURE — 25010000002 PROMETHAZINE PER 50 MG: Performed by: PHYSICIAN ASSISTANT

## 2017-05-28 PROCEDURE — 70450 CT HEAD/BRAIN W/O DYE: CPT

## 2017-05-28 PROCEDURE — 96375 TX/PRO/DX INJ NEW DRUG ADDON: CPT

## 2017-05-28 PROCEDURE — 96374 THER/PROPH/DIAG INJ IV PUSH: CPT

## 2017-05-28 PROCEDURE — 80053 COMPREHEN METABOLIC PANEL: CPT | Performed by: PHYSICIAN ASSISTANT

## 2017-05-28 PROCEDURE — 71010 HC CHEST PA OR AP: CPT

## 2017-05-28 PROCEDURE — 82550 ASSAY OF CK (CPK): CPT | Performed by: PHYSICIAN ASSISTANT

## 2017-05-28 PROCEDURE — 85730 THROMBOPLASTIN TIME PARTIAL: CPT | Performed by: PHYSICIAN ASSISTANT

## 2017-05-28 PROCEDURE — 81001 URINALYSIS AUTO W/SCOPE: CPT | Performed by: PHYSICIAN ASSISTANT

## 2017-05-28 PROCEDURE — 83735 ASSAY OF MAGNESIUM: CPT | Performed by: PHYSICIAN ASSISTANT

## 2017-05-28 PROCEDURE — 83880 ASSAY OF NATRIURETIC PEPTIDE: CPT | Performed by: PHYSICIAN ASSISTANT

## 2017-05-28 PROCEDURE — 83874 ASSAY OF MYOGLOBIN: CPT | Performed by: PHYSICIAN ASSISTANT

## 2017-05-28 PROCEDURE — 83690 ASSAY OF LIPASE: CPT | Performed by: PHYSICIAN ASSISTANT

## 2017-05-28 PROCEDURE — 93005 ELECTROCARDIOGRAM TRACING: CPT | Performed by: PHYSICIAN ASSISTANT

## 2017-05-28 PROCEDURE — 82553 CREATINE MB FRACTION: CPT | Performed by: PHYSICIAN ASSISTANT

## 2017-05-28 PROCEDURE — 87040 BLOOD CULTURE FOR BACTERIA: CPT | Performed by: PHYSICIAN ASSISTANT

## 2017-05-28 PROCEDURE — 99284 EMERGENCY DEPT VISIT MOD MDM: CPT

## 2017-05-28 PROCEDURE — 85379 FIBRIN DEGRADATION QUANT: CPT | Performed by: PHYSICIAN ASSISTANT

## 2017-05-28 PROCEDURE — 80164 ASSAY DIPROPYLACETIC ACD TOT: CPT | Performed by: PHYSICIAN ASSISTANT

## 2017-05-28 PROCEDURE — 71010 XR CHEST 1 VW: CPT | Performed by: RADIOLOGY

## 2017-05-28 PROCEDURE — 87086 URINE CULTURE/COLONY COUNT: CPT | Performed by: PHYSICIAN ASSISTANT

## 2017-05-28 PROCEDURE — 96361 HYDRATE IV INFUSION ADD-ON: CPT

## 2017-05-28 RX ORDER — ASPIRIN 81 MG/1
324 TABLET, CHEWABLE ORAL ONCE
Status: COMPLETED | OUTPATIENT
Start: 2017-05-28 | End: 2017-05-28

## 2017-05-28 RX ORDER — HYDROMORPHONE HYDROCHLORIDE 1 MG/ML
0.5 INJECTION, SOLUTION INTRAMUSCULAR; INTRAVENOUS; SUBCUTANEOUS ONCE
Status: COMPLETED | OUTPATIENT
Start: 2017-05-28 | End: 2017-05-28

## 2017-05-28 RX ORDER — ONDANSETRON 2 MG/ML
4 INJECTION INTRAMUSCULAR; INTRAVENOUS ONCE
Status: DISCONTINUED | OUTPATIENT
Start: 2017-05-28 | End: 2017-05-28

## 2017-05-28 RX ORDER — PROMETHAZINE HYDROCHLORIDE 25 MG/ML
12.5 INJECTION, SOLUTION INTRAMUSCULAR; INTRAVENOUS ONCE
Status: COMPLETED | OUTPATIENT
Start: 2017-05-28 | End: 2017-05-28

## 2017-05-28 RX ORDER — SODIUM CHLORIDE 0.9 % (FLUSH) 0.9 %
10 SYRINGE (ML) INJECTION AS NEEDED
Status: DISCONTINUED | OUTPATIENT
Start: 2017-05-28 | End: 2017-05-28 | Stop reason: HOSPADM

## 2017-05-28 RX ADMIN — PROMETHAZINE HYDROCHLORIDE 12.5 MG: 25 INJECTION INTRAMUSCULAR; INTRAVENOUS at 15:33

## 2017-05-28 RX ADMIN — SODIUM CHLORIDE 1000 ML: 9 INJECTION, SOLUTION INTRAVENOUS at 15:17

## 2017-05-28 RX ADMIN — ASPIRIN 324 MG: 81 TABLET, CHEWABLE ORAL at 15:17

## 2017-05-28 RX ADMIN — HYDROMORPHONE HYDROCHLORIDE 1 MG: 1 INJECTION, SOLUTION INTRAMUSCULAR; INTRAVENOUS; SUBCUTANEOUS at 15:19

## 2017-05-28 RX ADMIN — HYDROMORPHONE HYDROCHLORIDE 0.5 MG: 1 INJECTION, SOLUTION INTRAMUSCULAR; INTRAVENOUS; SUBCUTANEOUS at 16:15

## 2017-05-29 ENCOUNTER — HOSPITAL ENCOUNTER (EMERGENCY)
Facility: HOSPITAL | Age: 42
Discharge: HOME | End: 2017-05-29
Attending: FAMILY MEDICINE | Admitting: FAMILY MEDICINE

## 2017-05-29 VITALS
DIASTOLIC BLOOD PRESSURE: 86 MMHG | HEIGHT: 66 IN | WEIGHT: 168 LBS | RESPIRATION RATE: 14 BRPM | HEART RATE: 86 BPM | TEMPERATURE: 98 F | OXYGEN SATURATION: 95 % | SYSTOLIC BLOOD PRESSURE: 108 MMHG | BODY MASS INDEX: 27 KG/M2

## 2017-05-29 VITALS
HEART RATE: 84 BPM | TEMPERATURE: 97.8 F | DIASTOLIC BLOOD PRESSURE: 64 MMHG | HEIGHT: 69 IN | RESPIRATION RATE: 18 BRPM | SYSTOLIC BLOOD PRESSURE: 118 MMHG | OXYGEN SATURATION: 97 % | WEIGHT: 168 LBS | BODY MASS INDEX: 24.88 KG/M2

## 2017-05-29 DIAGNOSIS — G43.009 MIGRAINE WITHOUT AURA AND WITHOUT STATUS MIGRAINOSUS, NOT INTRACTABLE: Primary | ICD-10-CM

## 2017-05-29 DIAGNOSIS — R07.9 CHEST PAIN, UNSPECIFIED TYPE: ICD-10-CM

## 2017-05-29 LAB
CK MB SERPL-CCNC: <0.18 NG/ML (ref 0–5)
MYOGLOBIN SERPL-MCNC: 19 NG/ML (ref 0–109)
TROPONIN I SERPL-MCNC: <0.006 NG/ML

## 2017-05-29 PROCEDURE — 96375 TX/PRO/DX INJ NEW DRUG ADDON: CPT

## 2017-05-29 PROCEDURE — 84484 ASSAY OF TROPONIN QUANT: CPT | Performed by: PHYSICIAN ASSISTANT

## 2017-05-29 PROCEDURE — 93010 ELECTROCARDIOGRAM REPORT: CPT | Performed by: INTERNAL MEDICINE

## 2017-05-29 PROCEDURE — 96365 THER/PROPH/DIAG IV INF INIT: CPT

## 2017-05-29 PROCEDURE — 25010000002 HYDROMORPHONE PER 4 MG: Performed by: FAMILY MEDICINE

## 2017-05-29 PROCEDURE — 93005 ELECTROCARDIOGRAM TRACING: CPT | Performed by: PHYSICIAN ASSISTANT

## 2017-05-29 PROCEDURE — 25010000002 DEXAMETHASONE PER 1 MG: Performed by: PHYSICIAN ASSISTANT

## 2017-05-29 PROCEDURE — 99284 EMERGENCY DEPT VISIT MOD MDM: CPT

## 2017-05-29 PROCEDURE — 82553 CREATINE MB FRACTION: CPT | Performed by: PHYSICIAN ASSISTANT

## 2017-05-29 PROCEDURE — 96366 THER/PROPH/DIAG IV INF ADDON: CPT

## 2017-05-29 PROCEDURE — 25010000002 PROMETHAZINE PER 50 MG: Performed by: PHYSICIAN ASSISTANT

## 2017-05-29 PROCEDURE — 96376 TX/PRO/DX INJ SAME DRUG ADON: CPT

## 2017-05-29 PROCEDURE — 83874 ASSAY OF MYOGLOBIN: CPT | Performed by: PHYSICIAN ASSISTANT

## 2017-05-29 RX ORDER — SODIUM CHLORIDE 0.9 % (FLUSH) 0.9 %
10 SYRINGE (ML) INJECTION AS NEEDED
Status: DISCONTINUED | OUTPATIENT
Start: 2017-05-29 | End: 2017-05-29 | Stop reason: HOSPADM

## 2017-05-29 RX ORDER — DEXAMETHASONE SODIUM PHOSPHATE 4 MG/ML
4 INJECTION, SOLUTION INTRA-ARTICULAR; INTRALESIONAL; INTRAMUSCULAR; INTRAVENOUS; SOFT TISSUE ONCE
Status: COMPLETED | OUTPATIENT
Start: 2017-05-29 | End: 2017-05-29

## 2017-05-29 RX ADMIN — DEXAMETHASONE SODIUM PHOSPHATE 4 MG: 4 INJECTION, SOLUTION INTRAMUSCULAR; INTRAVENOUS at 16:45

## 2017-05-29 RX ADMIN — PROMETHAZINE HYDROCHLORIDE 12.5 MG: 25 INJECTION INTRAMUSCULAR; INTRAVENOUS at 14:54

## 2017-05-29 RX ADMIN — SODIUM CHLORIDE 1000 ML: 900 INJECTION, SOLUTION INTRAVENOUS at 14:51

## 2017-05-29 RX ADMIN — HYDROMORPHONE HYDROCHLORIDE 1 MG: 1 INJECTION, SOLUTION INTRAMUSCULAR; INTRAVENOUS; SUBCUTANEOUS at 14:55

## 2017-05-29 RX ADMIN — HYDROMORPHONE HYDROCHLORIDE 1 MG: 1 INJECTION, SOLUTION INTRAMUSCULAR; INTRAVENOUS; SUBCUTANEOUS at 16:47

## 2017-05-30 ENCOUNTER — TRANSCRIBE ORDERS (OUTPATIENT)
Dept: ADMINISTRATIVE | Facility: HOSPITAL | Age: 42
End: 2017-05-30

## 2017-05-30 ENCOUNTER — HOSPITAL ENCOUNTER (EMERGENCY)
Facility: HOSPITAL | Age: 42
Discharge: HOME | End: 2017-05-31
Attending: EMERGENCY MEDICINE | Admitting: EMERGENCY MEDICINE

## 2017-05-30 ENCOUNTER — TELEPHONE (OUTPATIENT)
Dept: UROLOGY | Facility: CLINIC | Age: 42
End: 2017-05-30

## 2017-05-30 DIAGNOSIS — R07.9 CHEST PAIN, UNSPECIFIED TYPE: Primary | ICD-10-CM

## 2017-05-30 DIAGNOSIS — G89.29 CHRONIC NONINTRACTABLE HEADACHE, UNSPECIFIED HEADACHE TYPE: ICD-10-CM

## 2017-05-30 DIAGNOSIS — R07.9 CHEST PAIN, UNSPECIFIED: Primary | ICD-10-CM

## 2017-05-30 DIAGNOSIS — R51.9 CHRONIC NONINTRACTABLE HEADACHE, UNSPECIFIED HEADACHE TYPE: ICD-10-CM

## 2017-05-30 LAB
6-ACETYL MORPHINE: NEGATIVE
A-A DO2: 14.5 MMHG (ref 0–300)
ALBUMIN SERPL-MCNC: 4.4 G/DL (ref 3.5–5)
ALBUMIN/GLOB SERPL: 1.3 G/DL (ref 1.5–2.5)
ALP SERPL-CCNC: 39 U/L (ref 35–104)
ALT SERPL W P-5'-P-CCNC: 50 U/L (ref 10–36)
AMPHET+METHAMPHET UR QL: NEGATIVE
ANION GAP SERPL CALCULATED.3IONS-SCNC: 4.1 MMOL/L (ref 3.6–11.2)
APTT PPP: <23 SECONDS (ref 24.4–31)
ARTERIAL PATENCY WRIST A: POSITIVE
AST SERPL-CCNC: 88 U/L (ref 10–30)
ATMOSPHERIC PRESS: 729 MMHG
BACTERIA SPEC AEROBE CULT: NORMAL
BACTERIA UR QL AUTO: ABNORMAL /HPF
BARBITURATES UR QL SCN: NEGATIVE
BASE EXCESS BLDA CALC-SCNC: 2 MMOL/L
BASOPHILS # BLD AUTO: 0.01 10*3/MM3 (ref 0–0.3)
BASOPHILS NFR BLD AUTO: 0.1 % (ref 0–2)
BDY SITE: ABNORMAL
BENZODIAZ UR QL SCN: NEGATIVE
BILIRUB SERPL-MCNC: 0.5 MG/DL (ref 0.2–1.8)
BILIRUB UR QL STRIP: NEGATIVE
BODY TEMPERATURE: 98.6 C
BUN BLD-MCNC: 7 MG/DL (ref 7–21)
BUN/CREAT SERPL: 10.9 (ref 7–25)
BUPRENORPHINE SERPL-MCNC: NEGATIVE NG/ML
CALCIUM SPEC-SCNC: 9.5 MG/DL (ref 7.7–10)
CANNABINOIDS SERPL QL: POSITIVE
CHLORIDE SERPL-SCNC: 106 MMOL/L (ref 99–112)
CK MB SERPL-CCNC: 0.23 NG/ML (ref 0–5)
CK MB SERPL-RTO: 0.3 % (ref 0–3)
CK SERPL-CCNC: 89 U/L (ref 24–173)
CLARITY UR: CLEAR
CO2 SERPL-SCNC: 27.9 MMOL/L (ref 24.3–31.9)
COCAINE UR QL: NEGATIVE
COHGB MFR BLD: 0.9 % (ref 0–5)
COLOR UR: YELLOW
CREAT BLD-MCNC: 0.64 MG/DL (ref 0.43–1.29)
D DIMER PPP FEU-MCNC: 0.19 MG/L (FEU) (ref 0–0.5)
DEPRECATED RDW RBC AUTO: 44.7 FL (ref 37–54)
EOSINOPHIL # BLD AUTO: 0.06 10*3/MM3 (ref 0–0.7)
EOSINOPHIL NFR BLD AUTO: 0.9 % (ref 0–5)
ERYTHROCYTE [DISTWIDTH] IN BLOOD BY AUTOMATED COUNT: 13.5 % (ref 11.5–14.5)
GFR SERPL CREATININE-BSD FRML MDRD: 102 ML/MIN/1.73
GLOBULIN UR ELPH-MCNC: 3.5 GM/DL
GLUCOSE BLD-MCNC: 89 MG/DL (ref 70–110)
GLUCOSE UR STRIP-MCNC: NEGATIVE MG/DL
HCO3 BLDA-SCNC: 25.3 MMOL/L (ref 22–26)
HCT VFR BLD AUTO: 41.6 % (ref 37–47)
HCT VFR BLD CALC: 44 % (ref 37–47)
HGB BLD-MCNC: 14.3 G/DL (ref 12–16)
HGB BLDA-MCNC: 14.9 G/DL (ref 12–16)
HGB UR QL STRIP.AUTO: NEGATIVE
HOROWITZ INDEX BLD+IHG-RTO: 21 %
HYALINE CASTS UR QL AUTO: ABNORMAL /LPF
IMM GRANULOCYTES # BLD: 0.01 10*3/MM3 (ref 0–0.03)
IMM GRANULOCYTES NFR BLD: 0.1 % (ref 0–0.5)
INR PPP: 0.97 (ref 0.8–1.1)
KETONES UR QL STRIP: NEGATIVE
LEUKOCYTE ESTERASE UR QL STRIP.AUTO: ABNORMAL
LYMPHOCYTES # BLD AUTO: 3.66 10*3/MM3 (ref 1–3)
LYMPHOCYTES NFR BLD AUTO: 53 % (ref 21–51)
MCH RBC QN AUTO: 31.4 PG (ref 27–33)
MCHC RBC AUTO-ENTMCNC: 34.4 G/DL (ref 33–37)
MCV RBC AUTO: 91.4 FL (ref 80–94)
MDMA UR QL SCN: NEGATIVE
METHADONE UR QL SCN: NEGATIVE
METHGB BLD QL: 0.4 % (ref 0–3)
MODALITY: ABNORMAL
MONOCYTES # BLD AUTO: 0.62 10*3/MM3 (ref 0.1–0.9)
MONOCYTES NFR BLD AUTO: 9 % (ref 0–10)
MYOGLOBIN SERPL-MCNC: 10 NG/ML (ref 0–109)
NEUTROPHILS # BLD AUTO: 2.54 10*3/MM3 (ref 1.4–6.5)
NEUTROPHILS NFR BLD AUTO: 36.9 % (ref 30–70)
NITRITE UR QL STRIP: NEGATIVE
OPIATES UR QL: NEGATIVE
OSMOLALITY SERPL CALC.SUM OF ELEC: 273.1 MOSM/KG (ref 273–305)
OXYCODONE UR QL SCN: NEGATIVE
OXYHGB MFR BLDV: 95.7 % (ref 85–100)
PCO2 BLDA: 35.5 MM HG (ref 35–45)
PCP UR QL SCN: NEGATIVE
PH BLDA: 7.47 PH UNITS (ref 7.35–7.45)
PH UR STRIP.AUTO: 6 [PH] (ref 5–8)
PLATELET # BLD AUTO: 141 10*3/MM3 (ref 130–400)
PMV BLD AUTO: 10.4 FL (ref 6–10)
PO2 BLDA: 86.2 MM HG (ref 80–100)
POTASSIUM BLD-SCNC: 5 MMOL/L (ref 3.5–5.3)
PROT SERPL-MCNC: 7.9 G/DL (ref 6–8)
PROT UR QL STRIP: NEGATIVE
PROTHROMBIN TIME: 10.7 SECONDS (ref 9.8–11.9)
RBC # BLD AUTO: 4.55 10*6/MM3 (ref 4.2–5.4)
RBC # UR: ABNORMAL /HPF
REF LAB TEST METHOD: ABNORMAL
SAO2 % BLDCOA: 97 % (ref 90–100)
SODIUM BLD-SCNC: 138 MMOL/L (ref 135–153)
SP GR UR STRIP: 1.01 (ref 1–1.03)
SQUAMOUS #/AREA URNS HPF: ABNORMAL /HPF
TROPONIN I SERPL-MCNC: <0.006 NG/ML
UROBILINOGEN UR QL STRIP: ABNORMAL
WBC NRBC COR # BLD: 6.9 10*3/MM3 (ref 4.5–12.5)
WBC UR QL AUTO: ABNORMAL /HPF

## 2017-05-30 PROCEDURE — 80307 DRUG TEST PRSMV CHEM ANLYZR: CPT | Performed by: EMERGENCY MEDICINE

## 2017-05-30 PROCEDURE — 82550 ASSAY OF CK (CPK): CPT | Performed by: EMERGENCY MEDICINE

## 2017-05-30 PROCEDURE — 25010000002 PROMETHAZINE PER 50 MG: Performed by: EMERGENCY MEDICINE

## 2017-05-30 PROCEDURE — 87086 URINE CULTURE/COLONY COUNT: CPT | Performed by: EMERGENCY MEDICINE

## 2017-05-30 PROCEDURE — 82553 CREATINE MB FRACTION: CPT | Performed by: EMERGENCY MEDICINE

## 2017-05-30 PROCEDURE — 85025 COMPLETE CBC W/AUTO DIFF WBC: CPT | Performed by: EMERGENCY MEDICINE

## 2017-05-30 PROCEDURE — 36600 WITHDRAWAL OF ARTERIAL BLOOD: CPT | Performed by: EMERGENCY MEDICINE

## 2017-05-30 PROCEDURE — 84484 ASSAY OF TROPONIN QUANT: CPT | Performed by: EMERGENCY MEDICINE

## 2017-05-30 PROCEDURE — 85610 PROTHROMBIN TIME: CPT | Performed by: EMERGENCY MEDICINE

## 2017-05-30 PROCEDURE — 82805 BLOOD GASES W/O2 SATURATION: CPT | Performed by: EMERGENCY MEDICINE

## 2017-05-30 PROCEDURE — 80053 COMPREHEN METABOLIC PANEL: CPT | Performed by: EMERGENCY MEDICINE

## 2017-05-30 PROCEDURE — 93010 ELECTROCARDIOGRAM REPORT: CPT | Performed by: INTERNAL MEDICINE

## 2017-05-30 PROCEDURE — 82375 ASSAY CARBOXYHB QUANT: CPT | Performed by: EMERGENCY MEDICINE

## 2017-05-30 PROCEDURE — 81001 URINALYSIS AUTO W/SCOPE: CPT | Performed by: EMERGENCY MEDICINE

## 2017-05-30 PROCEDURE — 93005 ELECTROCARDIOGRAM TRACING: CPT | Performed by: EMERGENCY MEDICINE

## 2017-05-30 PROCEDURE — 96372 THER/PROPH/DIAG INJ SC/IM: CPT

## 2017-05-30 PROCEDURE — 96374 THER/PROPH/DIAG INJ IV PUSH: CPT

## 2017-05-30 PROCEDURE — 85379 FIBRIN DEGRADATION QUANT: CPT | Performed by: EMERGENCY MEDICINE

## 2017-05-30 PROCEDURE — 83874 ASSAY OF MYOGLOBIN: CPT | Performed by: EMERGENCY MEDICINE

## 2017-05-30 PROCEDURE — 99285 EMERGENCY DEPT VISIT HI MDM: CPT

## 2017-05-30 PROCEDURE — 36415 COLL VENOUS BLD VENIPUNCTURE: CPT

## 2017-05-30 PROCEDURE — 83050 HGB METHEMOGLOBIN QUAN: CPT | Performed by: EMERGENCY MEDICINE

## 2017-05-30 PROCEDURE — 85730 THROMBOPLASTIN TIME PARTIAL: CPT | Performed by: EMERGENCY MEDICINE

## 2017-05-30 PROCEDURE — 25010000002 BUTORPHANOL PER 1 MG: Performed by: EMERGENCY MEDICINE

## 2017-05-30 RX ORDER — PROMETHAZINE HYDROCHLORIDE 25 MG/ML
25 INJECTION, SOLUTION INTRAMUSCULAR; INTRAVENOUS ONCE
Status: COMPLETED | OUTPATIENT
Start: 2017-05-30 | End: 2017-05-30

## 2017-05-30 RX ORDER — SODIUM CHLORIDE 0.9 % (FLUSH) 0.9 %
10 SYRINGE (ML) INJECTION AS NEEDED
Status: DISCONTINUED | OUTPATIENT
Start: 2017-05-30 | End: 2017-05-31 | Stop reason: HOSPADM

## 2017-05-30 RX ORDER — PROMETHAZINE HYDROCHLORIDE 25 MG/ML
25 INJECTION, SOLUTION INTRAMUSCULAR; INTRAVENOUS ONCE
Status: DISCONTINUED | OUTPATIENT
Start: 2017-05-30 | End: 2017-05-30

## 2017-05-30 RX ADMIN — PROMETHAZINE HYDROCHLORIDE 25 MG: 25 INJECTION INTRAMUSCULAR; INTRAVENOUS at 21:19

## 2017-05-30 RX ADMIN — BUTORPHANOL TARTRATE 1 MG: 2 INJECTION, SOLUTION INTRAMUSCULAR; INTRAVENOUS at 22:20

## 2017-05-31 ENCOUNTER — TELEPHONE (OUTPATIENT)
Dept: UROLOGY | Facility: CLINIC | Age: 42
End: 2017-05-31

## 2017-05-31 VITALS
WEIGHT: 168 LBS | BODY MASS INDEX: 24.88 KG/M2 | DIASTOLIC BLOOD PRESSURE: 81 MMHG | TEMPERATURE: 97.9 F | HEIGHT: 69 IN | RESPIRATION RATE: 16 BRPM | SYSTOLIC BLOOD PRESSURE: 128 MMHG | HEART RATE: 73 BPM | OXYGEN SATURATION: 100 %

## 2017-05-31 DIAGNOSIS — N39.46 MIXED INCONTINENCE: Primary | ICD-10-CM

## 2017-05-31 LAB
CK MB SERPL-CCNC: <0.18 NG/ML (ref 0–5)
CK MB SERPL-RTO: NORMAL % (ref 0–3)
CK SERPL-CCNC: 33 U/L (ref 24–173)
MYOGLOBIN SERPL-MCNC: 13 NG/ML (ref 0–109)
TROPONIN I SERPL-MCNC: <0.006 NG/ML

## 2017-05-31 PROCEDURE — 82550 ASSAY OF CK (CPK): CPT | Performed by: EMERGENCY MEDICINE

## 2017-05-31 PROCEDURE — 83874 ASSAY OF MYOGLOBIN: CPT | Performed by: EMERGENCY MEDICINE

## 2017-05-31 PROCEDURE — 84484 ASSAY OF TROPONIN QUANT: CPT | Performed by: EMERGENCY MEDICINE

## 2017-05-31 PROCEDURE — 82553 CREATINE MB FRACTION: CPT | Performed by: EMERGENCY MEDICINE

## 2017-05-31 RX ORDER — PROMETHAZINE HYDROCHLORIDE 25 MG/1
25 SUPPOSITORY RECTAL EVERY 6 HOURS PRN
Qty: 12 SUPPOSITORY | Refills: 0 | Status: SHIPPED | OUTPATIENT
Start: 2017-05-31 | End: 2017-07-27

## 2017-05-31 RX ORDER — OXYBUTYNIN CHLORIDE 5 MG/1
5 TABLET ORAL 3 TIMES DAILY
Qty: 90 TABLET | Refills: 11 | Status: SHIPPED | OUTPATIENT
Start: 2017-05-31 | End: 2018-03-22 | Stop reason: HOSPADM

## 2017-06-01 ENCOUNTER — HOSPITAL ENCOUNTER (OUTPATIENT)
Dept: CARDIOLOGY | Facility: HOSPITAL | Age: 42
End: 2017-06-01

## 2017-06-01 ENCOUNTER — HOSPITAL ENCOUNTER (OUTPATIENT)
Dept: NUCLEAR MEDICINE | Facility: HOSPITAL | Age: 42
End: 2017-06-01

## 2017-06-01 ENCOUNTER — HOSPITAL ENCOUNTER (OUTPATIENT)
Dept: NUCLEAR MEDICINE | Facility: HOSPITAL | Age: 42
Discharge: HOME | End: 2017-06-01

## 2017-06-01 DIAGNOSIS — R07.9 CHEST PAIN, UNSPECIFIED: ICD-10-CM

## 2017-06-01 LAB — BACTERIA SPEC AEROBE CULT: NORMAL

## 2017-06-02 LAB
BACTERIA SPEC AEROBE CULT: NORMAL
BACTERIA SPEC AEROBE CULT: NORMAL

## 2017-06-13 ENCOUNTER — HOSPITAL ENCOUNTER (EMERGENCY)
Facility: HOSPITAL | Age: 42
Discharge: HOME | End: 2017-06-13
Attending: EMERGENCY MEDICINE | Admitting: EMERGENCY MEDICINE

## 2017-06-13 VITALS
DIASTOLIC BLOOD PRESSURE: 82 MMHG | BODY MASS INDEX: 24.88 KG/M2 | WEIGHT: 168 LBS | HEART RATE: 66 BPM | OXYGEN SATURATION: 97 % | HEIGHT: 69 IN | RESPIRATION RATE: 18 BRPM | SYSTOLIC BLOOD PRESSURE: 117 MMHG | TEMPERATURE: 97.4 F

## 2017-06-13 DIAGNOSIS — G43.009 MIGRAINE WITHOUT AURA AND WITHOUT STATUS MIGRAINOSUS, NOT INTRACTABLE: Primary | ICD-10-CM

## 2017-06-13 PROCEDURE — 25010000002 PROMETHAZINE PER 50 MG: Performed by: PHYSICIAN ASSISTANT

## 2017-06-13 PROCEDURE — 96375 TX/PRO/DX INJ NEW DRUG ADDON: CPT

## 2017-06-13 PROCEDURE — 96365 THER/PROPH/DIAG IV INF INIT: CPT

## 2017-06-13 PROCEDURE — 25010000002 DIPHENHYDRAMINE PER 50 MG: Performed by: PHYSICIAN ASSISTANT

## 2017-06-13 PROCEDURE — 25010000002 DEXAMETHASONE PER 1 MG: Performed by: PHYSICIAN ASSISTANT

## 2017-06-13 PROCEDURE — 25010000002 BUTORPHANOL PER 1 MG: Performed by: PHYSICIAN ASSISTANT

## 2017-06-13 PROCEDURE — 96361 HYDRATE IV INFUSION ADD-ON: CPT

## 2017-06-13 PROCEDURE — 25010000002 HYDROMORPHONE PER 4 MG: Performed by: EMERGENCY MEDICINE

## 2017-06-13 PROCEDURE — 96367 TX/PROPH/DG ADDL SEQ IV INF: CPT

## 2017-06-13 PROCEDURE — 99284 EMERGENCY DEPT VISIT MOD MDM: CPT

## 2017-06-13 RX ORDER — SODIUM CHLORIDE 0.9 % (FLUSH) 0.9 %
10 SYRINGE (ML) INJECTION AS NEEDED
Status: DISCONTINUED | OUTPATIENT
Start: 2017-06-13 | End: 2017-06-13 | Stop reason: HOSPADM

## 2017-06-13 RX ORDER — DIPHENHYDRAMINE HYDROCHLORIDE 50 MG/ML
25 INJECTION INTRAMUSCULAR; INTRAVENOUS ONCE
Status: COMPLETED | OUTPATIENT
Start: 2017-06-13 | End: 2017-06-13

## 2017-06-13 RX ORDER — PROMETHAZINE HYDROCHLORIDE 25 MG/ML
12.5 INJECTION, SOLUTION INTRAMUSCULAR; INTRAVENOUS ONCE
Status: DISCONTINUED | OUTPATIENT
Start: 2017-06-13 | End: 2017-06-13

## 2017-06-13 RX ADMIN — HYDROMORPHONE HYDROCHLORIDE 1 MG: 1 INJECTION, SOLUTION INTRAMUSCULAR; INTRAVENOUS; SUBCUTANEOUS at 14:58

## 2017-06-13 RX ADMIN — DIPHENHYDRAMINE HYDROCHLORIDE 25 MG: 50 INJECTION INTRAMUSCULAR; INTRAVENOUS at 13:18

## 2017-06-13 RX ADMIN — PROMETHAZINE HYDROCHLORIDE 12.5 MG: 25 INJECTION INTRAMUSCULAR; INTRAVENOUS at 13:27

## 2017-06-13 RX ADMIN — SODIUM CHLORIDE 1000 ML: 9 INJECTION, SOLUTION INTRAVENOUS at 13:16

## 2017-06-13 RX ADMIN — BUTORPHANOL TARTRATE 2 MG: 2 INJECTION, SOLUTION INTRAMUSCULAR; INTRAVENOUS at 13:23

## 2017-06-13 RX ADMIN — DEXAMETHASONE SODIUM PHOSPHATE 10 MG: 4 INJECTION, SOLUTION INTRAMUSCULAR; INTRAVENOUS at 14:03

## 2017-06-13 NOTE — ED PROVIDER NOTES
Subjective   Patient is a 42 y.o. female presenting with migraines.   History provided by:  Patient   used: No    Migraine   Pain location:  L temporal and R temporal  Quality:  Sharp  Radiates to:  Does not radiate  Severity at highest:  8/10  Onset quality:  Sudden  Duration:  2 days  Timing:  Constant  Progression:  Worsening  Chronicity:  New  Similar to prior headaches: no    Context: not activity, not exposure to bright light, not caffeine, not defecating, not eating and not loud noise    Relieved by:  Nothing  Worsened by:  Nothing  Ineffective treatments:  None tried  Associated symptoms: no abdominal pain, no back pain, no blurred vision, no congestion, no eye pain, no facial pain, no loss of balance, no neck pain, no neck stiffness, no numbness and no photophobia    Risk factors: no anger, no family hx of SAH and does not have insomnia        Review of Systems   HENT: Negative for congestion.    Eyes: Negative for blurred vision, photophobia and pain.   Gastrointestinal: Negative for abdominal pain.   Musculoskeletal: Negative for back pain, neck pain and neck stiffness.   Neurological: Positive for headaches. Negative for numbness and loss of balance.   All other systems reviewed and are negative.      Past Medical History:   Diagnosis Date   • Abdominal pain    • Abdominal swelling    • Bipolar 1 disorder    • Brain tumor     R Frontal Lobe per pt   • Brain tumor 2014   • Constipation    • DDD (degenerative disc disease), cervical 05/29/2017   • Diarrhea    • Fibromyalgia    • IBS (irritable bowel syndrome)    • Migraine    • Nausea & vomiting    • PONV (postoperative nausea and vomiting)    • PTSD (post-traumatic stress disorder)    • Rectal bleeding        Allergies   Allergen Reactions   • Ativan [Lorazepam] Hallucinations     confusion   • Sulfa Antibiotics Shortness Of Breath and Swelling   • Compazine [Prochlorperazine Edisylate] Hives   • Demerol [Meperidine] Hives   •  Droperidol Itching   • Prochlorperazine Hives   • Toradol [Ketorolac Tromethamine] Hives and Itching   • Zofran [Ondansetron Hcl] Rash       Past Surgical History:   Procedure Laterality Date   • ANAL SCOPE N/A 7/28/2016    Procedure: ANAL SCOPE;  Surgeon: Kael Lopez MD;  Location: [facility] OR;  Service:    • APPENDECTOMY     • COLONOSCOPY N/A 6/30/2016    Procedure: COLONOSCOPY  CPTCODE:50874;  Surgeon: Jose Antonio Belle III, MD;  Location: [facility] OR;  Service:    • COLONOSCOPY N/A 7/7/2016    Procedure: COLONOSCOPY (98015) CPT;  Surgeon: Jose Antonio Belle III, MD;  Location: [facility] OR;  Service:    • CYSTOSCOPY RETROGRADE PYELOGRAM Bilateral 4/28/2017    Procedure: CYSTOSCOPY RETROGRADE PYELOGRAM;  Surgeon: Mu Blunt MD;  Location: [facility] OR;  Service:    • ENDOSCOPY N/A 6/30/2016    Procedure: ESOPHAGOGASTRODUODENOSCOPY WITH BIOPSY  CPTCODE:[id number];  Surgeon: Jose Antonio Belle III, MD;  Location: [facility] OR;  Service:    • HEMORRHOIDECTOMY N/A 7/28/2016    Procedure: HEMORRHOID STAPLING;  Surgeon: Kael Lopez MD;  Location: [facility] OR;  Service:    • HYSTERECTOMY     • KNEE SURGERY     • SHOULDER SURGERY      3 times       Family History   Problem Relation Age of Onset   • Crohn's disease Other    • Hypertension Other    • Diabetes Other    • Irritable bowel syndrome Other        Social History     Social History   • Marital status:      Spouse name: N/A   • Number of children: N/A   • Years of education: N/A     Social History Main Topics   • Smoking status: Former Smoker     Types: Cigarettes     Quit date: 11/1/2015   • Smokeless tobacco: Never Used   • Alcohol use No   • Drug use: Yes     Special: Marijuana   • Sexual activity: Defer     Other Topics Concern   • None     Social History Narrative           Objective   Physical Exam   Constitutional: She is oriented to person, place, and time. She appears well-developed and well-nourished.   HENT:   Head: Normocephalic.   Right Ear:  External ear normal.   Left Ear: External ear normal.   Nose: Nose normal.   Mouth/Throat: Oropharynx is clear and moist.   Eyes: Conjunctivae and EOM are normal. Pupils are equal, round, and reactive to light.   Neck: Normal range of motion. Neck supple. No tracheal deviation present. No thyromegaly present.   Cardiovascular: Normal rate, regular rhythm, normal heart sounds and intact distal pulses.    Pulmonary/Chest: Effort normal and breath sounds normal.   Abdominal: Soft. Bowel sounds are normal.   Musculoskeletal: Normal range of motion.   Neurological: She is alert and oriented to person, place, and time. She has normal reflexes.   Skin: Skin is warm and dry.   Psychiatric: She has a normal mood and affect. Her behavior is normal. Judgment and thought content normal.   Nursing note and vitals reviewed.      Procedures         ED Course  ED Course   Comment By Time   42-year-old female comes into the emergency department with chief complaint migraine headache ×2 days.  Patient has had associated nausea, vomiting.  Patient is well-developed this ER for chronic migraines.  She does report this feels like one of her typical headaches. Describes that pain as temporal sharp, throbbing pain. Patient denies any fever, chills, neck stiffness. Mark Stubbs PA-C 06/13 1243   Patient has tried taking her medications at home without relief. Mark Stubbs PA-C 06/13 7753   Patient does report that her headache is improved at this time. Advised to return if condition worsens. Mark Stubbs PA-C 06/13 132                  MDM  Number of Diagnoses or Management Options  Migraine without aura and without status migrainosus, not intractable: new and requires workup  Risk of Complications, Morbidity, and/or Mortality  Presenting problems: moderate  Diagnostic procedures: moderate  Management options: moderate    Patient Progress  Patient progress: stable      Final diagnoses:   Migraine without aura and  without status migrainosus, not intractable            Mark Stubbs PA-C  06/13/17 7682

## 2017-06-14 ENCOUNTER — HOSPITAL ENCOUNTER (EMERGENCY)
Facility: HOSPITAL | Age: 42
Discharge: HOME | End: 2017-06-14
Attending: EMERGENCY MEDICINE | Admitting: EMERGENCY MEDICINE

## 2017-06-14 VITALS
DIASTOLIC BLOOD PRESSURE: 66 MMHG | RESPIRATION RATE: 16 BRPM | HEIGHT: 69 IN | HEART RATE: 73 BPM | TEMPERATURE: 97.4 F | BODY MASS INDEX: 24.88 KG/M2 | OXYGEN SATURATION: 99 % | SYSTOLIC BLOOD PRESSURE: 134 MMHG | WEIGHT: 168 LBS

## 2017-06-14 DIAGNOSIS — IMO0002 CHRONIC MIGRAINE: Primary | ICD-10-CM

## 2017-06-14 PROCEDURE — 25010000002 PROMETHAZINE PER 50 MG: Performed by: EMERGENCY MEDICINE

## 2017-06-14 PROCEDURE — 25010000002 DIAZEPAM PER 5 MG: Performed by: EMERGENCY MEDICINE

## 2017-06-14 PROCEDURE — 99283 EMERGENCY DEPT VISIT LOW MDM: CPT

## 2017-06-14 PROCEDURE — 96372 THER/PROPH/DIAG INJ SC/IM: CPT

## 2017-06-14 RX ORDER — SUMATRIPTAN 6 MG/.5ML
6 INJECTION, SOLUTION SUBCUTANEOUS ONCE
Status: COMPLETED | OUTPATIENT
Start: 2017-06-14 | End: 2017-06-14

## 2017-06-14 RX ORDER — HYDROCODONE BITARTRATE AND ACETAMINOPHEN 7.5; 325 MG/1; MG/1
1 TABLET ORAL ONCE
Status: COMPLETED | OUTPATIENT
Start: 2017-06-14 | End: 2017-06-14

## 2017-06-14 RX ORDER — PROMETHAZINE HYDROCHLORIDE 25 MG/ML
50 INJECTION, SOLUTION INTRAMUSCULAR; INTRAVENOUS ONCE
Status: COMPLETED | OUTPATIENT
Start: 2017-06-14 | End: 2017-06-14

## 2017-06-14 RX ORDER — DIAZEPAM 5 MG/ML
10 INJECTION, SOLUTION INTRAMUSCULAR; INTRAVENOUS ONCE
Status: COMPLETED | OUTPATIENT
Start: 2017-06-14 | End: 2017-06-14

## 2017-06-14 RX ADMIN — SUMATRIPTAN 6 MG: 6 INJECTION, SOLUTION SUBCUTANEOUS at 13:59

## 2017-06-14 RX ADMIN — DIAZEPAM 10 MG: 5 INJECTION, SOLUTION INTRAMUSCULAR; INTRAVENOUS at 12:58

## 2017-06-14 RX ADMIN — PROMETHAZINE HYDROCHLORIDE 50 MG: 25 INJECTION INTRAMUSCULAR; INTRAVENOUS at 12:56

## 2017-06-14 RX ADMIN — HYDROCODONE BITARTRATE AND ACETAMINOPHEN 1 TABLET: 7.5; 325 TABLET ORAL at 13:59

## 2017-06-14 NOTE — ED PROVIDER NOTES
Subjective   HPI Comments: Patient states she can't get an appointment with neurologist.    Patient is a 42 y.o. female presenting with headaches.   History provided by:  Patient   used: No    Headache   Pain location:  Generalized  Quality:  Dull  Radiates to:  Does not radiate  Severity currently:  10/10  Severity at highest:  10/10  Onset quality:  Sudden  Timing:  Constant  Progression:  Worsening  Chronicity:  Chronic  Similar to prior headaches: yes    Context: emotional stress    Relieved by:  Nothing  Worsened by:  Nothing  Ineffective treatments:  Prescription medications  Associated symptoms: nausea and neck pain    Associated symptoms: no abdominal pain and no fever        Review of Systems   Constitutional: Negative.  Negative for fever.   Respiratory: Negative.    Cardiovascular: Negative.  Negative for chest pain.   Gastrointestinal: Positive for nausea. Negative for abdominal pain.   Endocrine: Negative.    Genitourinary: Negative.  Negative for dysuria.   Musculoskeletal: Positive for neck pain.   Skin: Negative.    Neurological: Positive for headaches.   Psychiatric/Behavioral: Negative.    All other systems reviewed and are negative.      Past Medical History:   Diagnosis Date   • Abdominal pain    • Abdominal swelling    • Bipolar 1 disorder    • Brain tumor     R Frontal Lobe per pt   • Brain tumor 2014   • Constipation    • DDD (degenerative disc disease), cervical 05/29/2017   • Diarrhea    • Fibromyalgia    • IBS (irritable bowel syndrome)    • Migraine    • Nausea & vomiting    • PONV (postoperative nausea and vomiting)    • PTSD (post-traumatic stress disorder)    • Rectal bleeding        Allergies   Allergen Reactions   • Ativan [Lorazepam] Hallucinations     confusion   • Sulfa Antibiotics Shortness Of Breath and Swelling   • Compazine [Prochlorperazine Edisylate] Hives   • Demerol [Meperidine] Hives   • Droperidol Itching   • Prochlorperazine Hives   • Toradol  [Ketorolac Tromethamine] Hives and Itching   • Zofran [Ondansetron Hcl] Rash       Past Surgical History:   Procedure Laterality Date   • ANAL SCOPE N/A 7/28/2016    Procedure: ANAL SCOPE;  Surgeon: Kael Lopez MD;  Location: [facility] OR;  Service:    • APPENDECTOMY     • COLONOSCOPY N/A 6/30/2016    Procedure: COLONOSCOPY  CPTCODE:57085;  Surgeon: Jose Antonio Belle III, MD;  Location: [facility] OR;  Service:    • COLONOSCOPY N/A 7/7/2016    Procedure: COLONOSCOPY (31581) CPT;  Surgeon: Jose Antonio Belle III, MD;  Location: [facility] OR;  Service:    • CYSTOSCOPY RETROGRADE PYELOGRAM Bilateral 4/28/2017    Procedure: CYSTOSCOPY RETROGRADE PYELOGRAM;  Surgeon: Mu Blunt MD;  Location: [facility] OR;  Service:    • ENDOSCOPY N/A 6/30/2016    Procedure: ESOPHAGOGASTRODUODENOSCOPY WITH BIOPSY  CPTCODE:84867;  Surgeon: Jose Antonio Belle III, MD;  Location: [facility] OR;  Service:    • HEMORRHOIDECTOMY N/A 7/28/2016    Procedure: HEMORRHOID STAPLING;  Surgeon: Kael Lopez MD;  Location: [facility] OR;  Service:    • HYSTERECTOMY     • KNEE SURGERY     • SHOULDER SURGERY      3 times       Family History   Problem Relation Age of Onset   • Crohn's disease Other    • Hypertension Other    • Diabetes Other    • Irritable bowel syndrome Other        Social History     Social History   • Marital status:      Spouse name: N/A   • Number of children: N/A   • Years of education: N/A     Social History Main Topics   • Smoking status: Former Smoker     Types: Cigarettes     Quit date: 11/1/2015   • Smokeless tobacco: Never Used   • Alcohol use No   • Drug use: Yes     Special: Marijuana   • Sexual activity: Defer     Other Topics Concern   • None     Social History Narrative         Objective   Physical Exam   Constitutional: She is oriented to person, place, and time. She appears well-developed and well-nourished. No distress.   HENT:   Head: Normocephalic and atraumatic.   Right Ear: External ear normal.   Left  Ear: External ear normal.   Nose: Nose normal.   Eyes: Conjunctivae and EOM are normal. Pupils are equal, round, and reactive to light.   Neck: No JVD present. No tracheal deviation present.       spasm   Cardiovascular: Normal rate, regular rhythm and normal heart sounds.    No murmur heard.  Pulmonary/Chest: Effort normal and breath sounds normal. No respiratory distress. She has no wheezes.   Abdominal: Soft. Bowel sounds are normal. There is no tenderness.   Musculoskeletal: Normal range of motion. She exhibits no edema or deformity.   Neurological: She is alert and oriented to person, place, and time. No cranial nerve deficit.   Skin: Skin is warm and dry. No rash noted. She is not diaphoretic. No erythema. No pallor.   Psychiatric: She has a normal mood and affect. Her behavior is normal. Thought content normal.   Nursing note and vitals reviewed.      Procedures           ED Course     Some improvement and discussed the chronicity of her headaches and need for outpatient workup                MDM  Number of Diagnoses or Management Options  Chronic migraine: established and worsening  Risk of Complications, Morbidity, and/or Mortality  Presenting problems: moderate  Diagnostic procedures: moderate  Management options: moderate        Final diagnoses:   Chronic migraine       Documentation assistance provided by asad Mak.  Information recorded by the asad was done at my direction and has been verified and validated by me.     Girish Mak  06/14/17 1205       Girish Mak  06/14/17 1206       Girish Mak  06/14/17 1325       Kael Mak MD  06/14/17 3487

## 2017-06-14 NOTE — DISCHARGE INSTRUCTIONS

## 2017-06-22 ENCOUNTER — APPOINTMENT (OUTPATIENT)
Dept: CT IMAGING | Facility: HOSPITAL | Age: 42
End: 2017-06-22

## 2017-06-22 ENCOUNTER — HOSPITAL ENCOUNTER (EMERGENCY)
Facility: HOSPITAL | Age: 42
Discharge: HOME | End: 2017-06-22
Attending: EMERGENCY MEDICINE | Admitting: EMERGENCY MEDICINE

## 2017-06-22 VITALS
SYSTOLIC BLOOD PRESSURE: 119 MMHG | RESPIRATION RATE: 18 BRPM | TEMPERATURE: 98.6 F | DIASTOLIC BLOOD PRESSURE: 84 MMHG | HEIGHT: 69 IN | WEIGHT: 168 LBS | HEART RATE: 72 BPM | OXYGEN SATURATION: 97 % | BODY MASS INDEX: 24.88 KG/M2

## 2017-06-22 DIAGNOSIS — R10.9 FLANK PAIN: Primary | ICD-10-CM

## 2017-06-22 DIAGNOSIS — R31.9 HEMATURIA: ICD-10-CM

## 2017-06-22 LAB
ALBUMIN SERPL-MCNC: 4.3 G/DL (ref 3.5–5)
ALBUMIN/GLOB SERPL: 1.4 G/DL (ref 1.5–2.5)
ALP SERPL-CCNC: 68 U/L (ref 35–104)
ALT SERPL W P-5'-P-CCNC: 41 U/L (ref 10–36)
AMYLASE SERPL-CCNC: 55 U/L (ref 28–100)
ANION GAP SERPL CALCULATED.3IONS-SCNC: 5.8 MMOL/L (ref 3.6–11.2)
AST SERPL-CCNC: 49 U/L (ref 10–30)
BACTERIA UR QL AUTO: ABNORMAL /HPF
BASOPHILS # BLD AUTO: 0.01 10*3/MM3 (ref 0–0.3)
BASOPHILS NFR BLD AUTO: 0.2 % (ref 0–2)
BILIRUB SERPL-MCNC: 0.9 MG/DL (ref 0.2–1.8)
BILIRUB UR QL STRIP: NEGATIVE
BUN BLD-MCNC: 9 MG/DL (ref 7–21)
BUN/CREAT SERPL: 13.6 (ref 7–25)
CALCIUM SPEC-SCNC: 9.5 MG/DL (ref 7.7–10)
CHLORIDE SERPL-SCNC: 107 MMOL/L (ref 99–112)
CLARITY UR: ABNORMAL
CO2 SERPL-SCNC: 29.2 MMOL/L (ref 24.3–31.9)
COLOR UR: ABNORMAL
CREAT BLD-MCNC: 0.66 MG/DL (ref 0.43–1.29)
DEPRECATED RDW RBC AUTO: 46.4 FL (ref 37–54)
EOSINOPHIL # BLD AUTO: 0.12 10*3/MM3 (ref 0–0.7)
EOSINOPHIL NFR BLD AUTO: 2.2 % (ref 0–5)
ERYTHROCYTE [DISTWIDTH] IN BLOOD BY AUTOMATED COUNT: 13.7 % (ref 11.5–14.5)
GFR SERPL CREATININE-BSD FRML MDRD: 98 ML/MIN/1.73
GLOBULIN UR ELPH-MCNC: 3.1 GM/DL
GLUCOSE BLD-MCNC: 80 MG/DL (ref 70–110)
GLUCOSE UR STRIP-MCNC: NEGATIVE MG/DL
HCT VFR BLD AUTO: 43.7 % (ref 37–47)
HGB BLD-MCNC: 14.4 G/DL (ref 12–16)
HGB UR QL STRIP.AUTO: ABNORMAL
HYALINE CASTS UR QL AUTO: ABNORMAL /LPF
IMM GRANULOCYTES # BLD: 0.01 10*3/MM3 (ref 0–0.03)
IMM GRANULOCYTES NFR BLD: 0.2 % (ref 0–0.5)
KETONES UR QL STRIP: NEGATIVE
LEUKOCYTE ESTERASE UR QL STRIP.AUTO: ABNORMAL
LIPASE SERPL-CCNC: 41 U/L (ref 13–60)
LYMPHOCYTES # BLD AUTO: 2.36 10*3/MM3 (ref 1–3)
LYMPHOCYTES NFR BLD AUTO: 42.8 % (ref 21–51)
MCH RBC QN AUTO: 30.8 PG (ref 27–33)
MCHC RBC AUTO-ENTMCNC: 33 G/DL (ref 33–37)
MCV RBC AUTO: 93.4 FL (ref 80–94)
MONOCYTES # BLD AUTO: 0.59 10*3/MM3 (ref 0.1–0.9)
MONOCYTES NFR BLD AUTO: 10.7 % (ref 0–10)
NEUTROPHILS # BLD AUTO: 2.42 10*3/MM3 (ref 1.4–6.5)
NEUTROPHILS NFR BLD AUTO: 43.9 % (ref 30–70)
NITRITE UR QL STRIP: NEGATIVE
OSMOLALITY SERPL CALC.SUM OF ELEC: 280.8 MOSM/KG (ref 273–305)
PH UR STRIP.AUTO: 7.5 [PH] (ref 5–8)
PLATELET # BLD AUTO: 132 10*3/MM3 (ref 130–400)
PMV BLD AUTO: 11.1 FL (ref 6–10)
POTASSIUM BLD-SCNC: 3.7 MMOL/L (ref 3.5–5.3)
PROT SERPL-MCNC: 7.4 G/DL (ref 6–8)
PROT UR QL STRIP: NEGATIVE
RBC # BLD AUTO: 4.68 10*6/MM3 (ref 4.2–5.4)
RBC # UR: ABNORMAL /HPF
REF LAB TEST METHOD: ABNORMAL
SODIUM BLD-SCNC: 142 MMOL/L (ref 135–153)
SP GR UR STRIP: 1.01 (ref 1–1.03)
SQUAMOUS #/AREA URNS HPF: ABNORMAL /HPF
UROBILINOGEN UR QL STRIP: ABNORMAL
WBC NRBC COR # BLD: 5.51 10*3/MM3 (ref 4.5–12.5)
WBC UR QL AUTO: ABNORMAL /HPF

## 2017-06-22 PROCEDURE — 80053 COMPREHEN METABOLIC PANEL: CPT | Performed by: PHYSICIAN ASSISTANT

## 2017-06-22 PROCEDURE — 96376 TX/PRO/DX INJ SAME DRUG ADON: CPT

## 2017-06-22 PROCEDURE — 99283 EMERGENCY DEPT VISIT LOW MDM: CPT

## 2017-06-22 PROCEDURE — 85025 COMPLETE CBC W/AUTO DIFF WBC: CPT | Performed by: PHYSICIAN ASSISTANT

## 2017-06-22 PROCEDURE — 83690 ASSAY OF LIPASE: CPT | Performed by: PHYSICIAN ASSISTANT

## 2017-06-22 PROCEDURE — 81001 URINALYSIS AUTO W/SCOPE: CPT | Performed by: PHYSICIAN ASSISTANT

## 2017-06-22 PROCEDURE — 36415 COLL VENOUS BLD VENIPUNCTURE: CPT

## 2017-06-22 PROCEDURE — 25010000002 PROMETHAZINE PER 50 MG: Performed by: EMERGENCY MEDICINE

## 2017-06-22 PROCEDURE — 74176 CT ABD & PELVIS W/O CONTRAST: CPT | Performed by: RADIOLOGY

## 2017-06-22 PROCEDURE — 87086 URINE CULTURE/COLONY COUNT: CPT | Performed by: PHYSICIAN ASSISTANT

## 2017-06-22 PROCEDURE — 82150 ASSAY OF AMYLASE: CPT | Performed by: PHYSICIAN ASSISTANT

## 2017-06-22 PROCEDURE — 96375 TX/PRO/DX INJ NEW DRUG ADDON: CPT

## 2017-06-22 PROCEDURE — 96361 HYDRATE IV INFUSION ADD-ON: CPT

## 2017-06-22 PROCEDURE — 96374 THER/PROPH/DIAG INJ IV PUSH: CPT

## 2017-06-22 PROCEDURE — 74176 CT ABD & PELVIS W/O CONTRAST: CPT

## 2017-06-22 PROCEDURE — 25010000002 HYDROMORPHONE PER 4 MG: Performed by: EMERGENCY MEDICINE

## 2017-06-22 RX ORDER — PROMETHAZINE HYDROCHLORIDE 25 MG/ML
12.5 INJECTION, SOLUTION INTRAMUSCULAR; INTRAVENOUS ONCE
Status: COMPLETED | OUTPATIENT
Start: 2017-06-22 | End: 2017-06-22

## 2017-06-22 RX ORDER — HYDROMORPHONE HYDROCHLORIDE 1 MG/ML
0.5 INJECTION, SOLUTION INTRAMUSCULAR; INTRAVENOUS; SUBCUTANEOUS ONCE
Status: COMPLETED | OUTPATIENT
Start: 2017-06-22 | End: 2017-06-22

## 2017-06-22 RX ORDER — SODIUM CHLORIDE 0.9 % (FLUSH) 0.9 %
10 SYRINGE (ML) INJECTION AS NEEDED
Status: DISCONTINUED | OUTPATIENT
Start: 2017-06-22 | End: 2017-06-22 | Stop reason: HOSPADM

## 2017-06-22 RX ADMIN — SODIUM CHLORIDE 1000 ML: 9 INJECTION, SOLUTION INTRAVENOUS at 20:19

## 2017-06-22 RX ADMIN — HYDROMORPHONE HYDROCHLORIDE 1 MG: 1 INJECTION, SOLUTION INTRAMUSCULAR; INTRAVENOUS; SUBCUTANEOUS at 20:20

## 2017-06-22 RX ADMIN — HYDROMORPHONE HYDROCHLORIDE 0.5 MG: 1 INJECTION, SOLUTION INTRAMUSCULAR; INTRAVENOUS; SUBCUTANEOUS at 21:32

## 2017-06-22 RX ADMIN — PROMETHAZINE HYDROCHLORIDE 12.5 MG: 25 INJECTION INTRAMUSCULAR; INTRAVENOUS at 20:21

## 2017-06-22 NOTE — ED PROVIDER NOTES
Subjective   Patient is a 42 y.o. female presenting with flank pain.   Flank Pain   Pain location:  R flank  Pain quality: sharp    Pain radiates to:  Does not radiate  Pain severity:  Moderate  Onset quality:  Sudden  Duration:  1 day  Timing:  Constant  Chronicity:  Recurrent  Context: not alcohol use and not eating    Relieved by:  Nothing  Worsened by:  Nothing  Ineffective treatments:  None tried  Associated symptoms: nausea and vomiting    Associated symptoms: no chest pain, no diarrhea, no dysuria and no fever    Associated symptoms comment:  Reports that she was told that she had IgA nephropathy.  She follows with Dr. Gee      Review of Systems   Constitutional: Negative.  Negative for fever.   HENT: Negative.    Respiratory: Negative.    Cardiovascular: Negative.  Negative for chest pain.   Gastrointestinal: Positive for nausea and vomiting. Negative for abdominal pain and diarrhea.   Endocrine: Negative.    Genitourinary: Positive for flank pain. Negative for dysuria.   Skin: Negative.    Neurological: Negative.    Psychiatric/Behavioral: Negative.    All other systems reviewed and are negative.      Past Medical History:   Diagnosis Date   • Abdominal pain    • Abdominal swelling    • Bipolar 1 disorder    • Brain tumor     R Frontal Lobe per pt   • Brain tumor 2014   • Constipation    • DDD (degenerative disc disease), cervical 05/29/2017   • Diarrhea    • Fibromyalgia    • IBS (irritable bowel syndrome)    • Migraine    • Nausea & vomiting    • PONV (postoperative nausea and vomiting)    • PTSD (post-traumatic stress disorder)    • Rectal bleeding        Allergies   Allergen Reactions   • Ativan [Lorazepam] Hallucinations     confusion   • Sulfa Antibiotics Shortness Of Breath and Swelling   • Compazine [Prochlorperazine Edisylate] Hives   • Demerol [Meperidine] Hives   • Droperidol Itching   • Prochlorperazine Hives   • Toradol [Ketorolac Tromethamine] Hives and Itching   • Zofran [Ondansetron Hcl]  Rash       Past Surgical History:   Procedure Laterality Date   • ANAL SCOPE N/A 7/28/2016    Procedure: ANAL SCOPE;  Surgeon: Kael Lopez MD;  Location: [facility] OR;  Service:    • APPENDECTOMY     • COLONOSCOPY N/A 6/30/2016    Procedure: COLONOSCOPY  CPTCODE:75997;  Surgeon: Jose Antonio Belle III, MD;  Location: [facility] OR;  Service:    • COLONOSCOPY N/A 7/7/2016    Procedure: COLONOSCOPY (62165) CPT;  Surgeon: Jose Antonio Belle III, MD;  Location: [facility] OR;  Service:    • CYSTOSCOPY RETROGRADE PYELOGRAM Bilateral 4/28/2017    Procedure: CYSTOSCOPY RETROGRADE PYELOGRAM;  Surgeon: Mu Blunt MD;  Location: [facility] OR;  Service:    • ENDOSCOPY N/A 6/30/2016    Procedure: ESOPHAGOGASTRODUODENOSCOPY WITH BIOPSY  CPTCODE:64446;  Surgeon: Jose Antonio Belle III, MD;  Location: [facility] OR;  Service:    • HEMORRHOIDECTOMY N/A 7/28/2016    Procedure: HEMORRHOID STAPLING;  Surgeon: Kael Lopez MD;  Location: [facility] OR;  Service:    • HYSTERECTOMY     • KNEE SURGERY     • SHOULDER SURGERY      3 times       Family History   Problem Relation Age of Onset   • Crohn's disease Other    • Hypertension Other    • Diabetes Other    • Irritable bowel syndrome Other        Social History     Social History   • Marital status:      Spouse name: N/A   • Number of children: N/A   • Years of education: N/A     Social History Main Topics   • Smoking status: Former Smoker     Types: Cigarettes     Quit date: 11/1/2015   • Smokeless tobacco: Never Used   • Alcohol use No   • Drug use: Yes     Special: Marijuana   • Sexual activity: Defer     Other Topics Concern   • Not on file     Social History Narrative           Objective   Physical Exam   Constitutional: She is oriented to person, place, and time. She appears well-developed and well-nourished. No distress.   HENT:   Head: Normocephalic and atraumatic.   Right Ear: External ear normal.   Left Ear: External ear normal.   Nose: Nose normal.   Eyes:  Conjunctivae and EOM are normal. Pupils are equal, round, and reactive to light.   Neck: Normal range of motion. Neck supple. No JVD present. No tracheal deviation present.   Cardiovascular: Normal rate, regular rhythm and normal heart sounds.    No murmur heard.  Pulmonary/Chest: Effort normal and breath sounds normal. No respiratory distress. She has no wheezes.   Abdominal: Soft. Bowel sounds are normal. There is tenderness (right CVA).   Musculoskeletal: Normal range of motion. She exhibits no edema or deformity.   Neurological: She is alert and oriented to person, place, and time. No cranial nerve deficit.   Skin: Skin is warm and dry. No rash noted. She is not diaphoretic. No erythema. No pallor.   Psychiatric: She has a normal mood and affect. Her behavior is normal. Thought content normal.   Nursing note and vitals reviewed.      Procedures         ED Course  ED Course                  MDM  Number of Diagnoses or Management Options  Flank pain: established and worsening  Hematuria: established and worsening     Amount and/or Complexity of Data Reviewed  Clinical lab tests: ordered and reviewed  Tests in the radiology section of CPT®: ordered and reviewed  Discuss the patient with other providers: yes    Risk of Complications, Morbidity, and/or Mortality  Presenting problems: moderate        Final diagnoses:   Flank pain   Hematuria            TAMI Penn  06/22/17 6203

## 2017-06-23 NOTE — DISCHARGE INSTRUCTIONS

## 2017-06-24 ENCOUNTER — HOSPITAL ENCOUNTER (EMERGENCY)
Facility: HOSPITAL | Age: 42
Discharge: HOME | End: 2017-06-24
Attending: FAMILY MEDICINE | Admitting: FAMILY MEDICINE

## 2017-06-24 VITALS
OXYGEN SATURATION: 96 % | TEMPERATURE: 97.6 F | SYSTOLIC BLOOD PRESSURE: 122 MMHG | RESPIRATION RATE: 18 BRPM | WEIGHT: 168 LBS | HEART RATE: 76 BPM | BODY MASS INDEX: 24.88 KG/M2 | HEIGHT: 69 IN | DIASTOLIC BLOOD PRESSURE: 74 MMHG

## 2017-06-24 DIAGNOSIS — G43.909 MIGRAINE WITHOUT STATUS MIGRAINOSUS, NOT INTRACTABLE, UNSPECIFIED MIGRAINE TYPE: Primary | ICD-10-CM

## 2017-06-24 PROCEDURE — 96372 THER/PROPH/DIAG INJ SC/IM: CPT

## 2017-06-24 PROCEDURE — 25010000002 PROMETHAZINE PER 50 MG: Performed by: PHYSICIAN ASSISTANT

## 2017-06-24 PROCEDURE — 25010000002 BUTORPHANOL PER 1 MG: Performed by: PHYSICIAN ASSISTANT

## 2017-06-24 PROCEDURE — 99283 EMERGENCY DEPT VISIT LOW MDM: CPT

## 2017-06-24 RX ORDER — PROMETHAZINE HYDROCHLORIDE 25 MG/ML
25 INJECTION, SOLUTION INTRAMUSCULAR; INTRAVENOUS ONCE
Status: COMPLETED | OUTPATIENT
Start: 2017-06-24 | End: 2017-06-24

## 2017-06-24 RX ADMIN — BUTORPHANOL TARTRATE 2 MG: 2 INJECTION, SOLUTION INTRAMUSCULAR; INTRAVENOUS at 23:30

## 2017-06-24 RX ADMIN — PROMETHAZINE HYDROCHLORIDE 25 MG: 25 INJECTION INTRAMUSCULAR; INTRAVENOUS at 23:30

## 2017-06-25 LAB — BACTERIA SPEC AEROBE CULT: NORMAL

## 2017-06-25 NOTE — ED NOTES
"Patient states \" which Doctor is going to see me tonight? Certain Doctors treat my headaches better than others that's why i want to know\".  Informed patient that Han would be the provider seeing her. Patient verbalized understanding.      Fe Perales RN  06/24/17 9752    "

## 2017-06-25 NOTE — ED PROVIDER NOTES
Subjective   Patient is a 42 y.o. female presenting with headaches.   History provided by:  Patient   used: No    Headache   Pain location:  Generalized  Quality:  Sharp  Radiates to:  L neck and R neck  Severity at highest:  10/10  Duration:  2 days  Timing:  Constant  Progression:  Worsening  Chronicity:  Recurrent  Similar to prior headaches: yes    Context: bright light and loud noise    Relieved by:  Nothing  Worsened by:  Sound, neck movement, light and activity  Ineffective treatments:  Cold packs  Associated symptoms: nausea and vomiting    Associated symptoms: no abdominal pain, no back pain, no blurred vision, no cough, no dizziness, no facial pain, no fever and no neck stiffness    Nausea:     Severity:  Mild    Duration:  2 days    Timing:  Constant  Risk factors: sedentary lifestyle    Risk factors: no anger        Review of Systems   Constitutional: Negative.  Negative for fever.   Eyes: Negative for blurred vision.   Respiratory: Negative.  Negative for cough.    Cardiovascular: Negative.  Negative for chest pain.   Gastrointestinal: Positive for nausea and vomiting. Negative for abdominal pain.   Endocrine: Negative.    Genitourinary: Negative.  Negative for dysuria.   Musculoskeletal: Negative for back pain and neck stiffness.   Skin: Negative.    Neurological: Positive for headaches. Negative for dizziness.   Psychiatric/Behavioral: Negative.    All other systems reviewed and are negative.      Past Medical History:   Diagnosis Date   • Abdominal pain    • Abdominal swelling    • Bipolar 1 disorder    • Brain tumor     R Frontal Lobe per pt   • Brain tumor 2014   • Constipation    • DDD (degenerative disc disease), cervical 05/29/2017   • Diarrhea    • Fibromyalgia    • IBS (irritable bowel syndrome)    • Migraine    • Nausea & vomiting    • PONV (postoperative nausea and vomiting)    • PTSD (post-traumatic stress disorder)    • Rectal bleeding        Allergies   Allergen  Reactions   • Ativan [Lorazepam] Hallucinations     confusion   • Sulfa Antibiotics Shortness Of Breath and Swelling   • Compazine [Prochlorperazine Edisylate] Hives   • Demerol [Meperidine] Hives   • Droperidol Itching   • Prochlorperazine Hives   • Toradol [Ketorolac Tromethamine] Hives and Itching   • Zofran [Ondansetron Hcl] Rash       Past Surgical History:   Procedure Laterality Date   • ANAL SCOPE N/A 7/28/2016    Procedure: ANAL SCOPE;  Surgeon: Kael Lopez MD;  Location: [facility] OR;  Service:    • APPENDECTOMY     • COLONOSCOPY N/A 6/30/2016    Procedure: COLONOSCOPY  CPTCODE:73464;  Surgeon: Jose Antonio Belle III, MD;  Location: [facility] OR;  Service:    • COLONOSCOPY N/A 7/7/2016    Procedure: COLONOSCOPY (09782) CPT;  Surgeon: Jose Antonio Belle III, MD;  Location: [facility] OR;  Service:    • CYSTOSCOPY RETROGRADE PYELOGRAM Bilateral 4/28/2017    Procedure: CYSTOSCOPY RETROGRADE PYELOGRAM;  Surgeon: Mu Blunt MD;  Location: [facility] OR;  Service:    • ENDOSCOPY N/A 6/30/2016    Procedure: ESOPHAGOGASTRODUODENOSCOPY WITH BIOPSY  CPTCODE:11562;  Surgeon: Jose Antonio Belle III, MD;  Location: [facility] OR;  Service:    • HEMORRHOIDECTOMY N/A 7/28/2016    Procedure: HEMORRHOID STAPLING;  Surgeon: Kael Lopez MD;  Location: [facility] OR;  Service:    • HYSTERECTOMY     • KNEE SURGERY     • SHOULDER SURGERY      3 times       Family History   Problem Relation Age of Onset   • Crohn's disease Other    • Hypertension Other    • Diabetes Other    • Irritable bowel syndrome Other        Social History     Social History   • Marital status:      Spouse name: N/A   • Number of children: N/A   • Years of education: N/A     Social History Main Topics   • Smoking status: Former Smoker     Types: Cigarettes     Quit date: 11/1/2015   • Smokeless tobacco: Never Used   • Alcohol use No   • Drug use: Yes     Special: Marijuana   • Sexual activity: Defer     Other Topics Concern   • Not on file      Social History Narrative           Objective   Physical Exam   Constitutional: She is oriented to person, place, and time. She appears well-developed and well-nourished. No distress.   HENT:   Head: Normocephalic and atraumatic.   Right Ear: External ear normal.   Left Ear: External ear normal.   Nose: Nose normal.   Eyes: Conjunctivae and EOM are normal. Pupils are equal, round, and reactive to light.   Neck: Normal range of motion. Neck supple. No JVD present. No tracheal deviation present.   Cardiovascular: Normal rate, regular rhythm and normal heart sounds.    No murmur heard.  Pulmonary/Chest: Effort normal and breath sounds normal. No respiratory distress. She has no wheezes.   Abdominal: Soft. Bowel sounds are normal. There is no tenderness.   Musculoskeletal: Normal range of motion. She exhibits no edema or deformity.   Neurological: She is alert and oriented to person, place, and time. She has normal strength. She is not disoriented. No cranial nerve deficit or sensory deficit.   (+) photophobia, phonophobia   Skin: Skin is warm and dry. No rash noted. She is not diaphoretic. No erythema. No pallor.   Psychiatric: She has a normal mood and affect. Her behavior is normal. Thought content normal.   Nursing note and vitals reviewed.      Procedures         ED Course  ED Course                  MDM    Final diagnoses:   Migraine without status migrainosus, not intractable, unspecified migraine type            Han Varghese PA-C  06/25/17 0155

## 2017-07-05 ENCOUNTER — OFFICE VISIT (OUTPATIENT)
Dept: UROLOGY | Facility: CLINIC | Age: 42
End: 2017-07-05

## 2017-07-05 VITALS
DIASTOLIC BLOOD PRESSURE: 79 MMHG | WEIGHT: 163 LBS | HEIGHT: 69 IN | BODY MASS INDEX: 24.14 KG/M2 | HEART RATE: 91 BPM | SYSTOLIC BLOOD PRESSURE: 110 MMHG

## 2017-07-05 DIAGNOSIS — R53.83 FATIGUE, UNSPECIFIED TYPE: ICD-10-CM

## 2017-07-05 DIAGNOSIS — R39.89 BLADDER PAIN: Primary | ICD-10-CM

## 2017-07-05 DIAGNOSIS — R31.0 GROSS HEMATURIA: ICD-10-CM

## 2017-07-05 LAB
BILIRUB BLD-MCNC: NEGATIVE MG/DL
CLARITY, POC: CLEAR
COLOR UR: YELLOW
GLUCOSE UR STRIP-MCNC: NEGATIVE MG/DL
KETONES UR QL: NEGATIVE
LEUKOCYTE EST, POC: NEGATIVE
NITRITE UR-MCNC: NEGATIVE MG/ML
PH UR: 6 [PH] (ref 5–8)
PROT UR STRIP-MCNC: NEGATIVE MG/DL
RBC # UR STRIP: NEGATIVE /UL
SP GR UR: 1.02 (ref 1–1.03)
TESTOST SERPL-MCNC: 15.3 NG/DL
UROBILINOGEN UR QL: NORMAL

## 2017-07-05 PROCEDURE — 84403 ASSAY OF TOTAL TESTOSTERONE: CPT | Performed by: UROLOGY

## 2017-07-05 PROCEDURE — 81003 URINALYSIS AUTO W/O SCOPE: CPT | Performed by: UROLOGY

## 2017-07-05 PROCEDURE — 99213 OFFICE O/P EST LOW 20 MIN: CPT | Performed by: UROLOGY

## 2017-07-05 NOTE — PROGRESS NOTES
Chief Complaint:          Chief Complaint   Patient presents with   • Urinary Retention       HPI:   42 y.o. female.    42-year-old white female status post a cystoscopy and a retrograde ureteral pyelogram diagnosed with IgA nephropathy who was sent to a nephrologist but did not keep the appointment I went ahead and recheck gave her an appointment she's had no bleeding no other complaints problems she says her flow is decreased and she wants to be dilated we will do this in the office per her request      Past Medical History:        Past Medical History:   Diagnosis Date   • Abdominal pain    • Abdominal swelling    • Bipolar 1 disorder    • Brain tumor     R Frontal Lobe per pt   • Brain tumor 2014   • Constipation    • DDD (degenerative disc disease), cervical 05/29/2017   • Diarrhea    • Fibromyalgia    • IBS (irritable bowel syndrome)    • Migraine    • Nausea & vomiting    • PONV (postoperative nausea and vomiting)    • PTSD (post-traumatic stress disorder)    • Rectal bleeding          Current Meds:     Current Outpatient Prescriptions   Medication Sig Dispense Refill   • azelastine (ASTEPRO) 0.15 % solution nasal spray 2 sprays into each nostril 2 (Two) Times a Day. 30 mL 0   • busPIRone (BUSPAR) 15 MG tablet Take 15 mg by mouth 3 (three) times a day        • butalbital-acetaminophen-caffeine (FIORICET) -40 MG per tablet Take 1-2 tablets by mouth Every 6 (Six) Hours As Needed for headaches or migraine. 20 tablet 0   • butorphanol (STADOL) 10 MG/ML nasal spray 1 spray into each nostril Daily.     • carbidopa-levodopa (SINEMET)  MG per tablet Take 1 tablet by mouth 3 (Three) Times a Day.     • cyclobenzaprine (FLEXERIL) 10 MG tablet Take 1 tablet by mouth 2 (Two) Times a Day As Needed for Muscle Spasms. 20 tablet 0   • divalproex (DEPAKOTE) 500 MG DR tablet Take 500 mg by mouth 2 (Two) Times a Day.     • famotidine (PEPCID) 40 MG tablet Take 40 mg by mouth 2 (Two) Times a Day As Needed for  heartburn.     • HYDROcodone-acetaminophen (NORCO) 7.5-325 MG per tablet Take 1 tablet by mouth Every 6 (Six) Hours As Needed for Moderate Pain (4-6).     • meclizine (ANTIVERT) 25 MG tablet Take 1 tablet by mouth Daily. 30 tablet 0   • ondansetron ODT (ZOFRAN-ODT) 4 MG disintegrating tablet Take 1 tablet by mouth 4 (Four) Times a Day. 15 tablet 0   • oxybutynin (DITROPAN) 5 MG tablet Take 1 tablet by mouth 3 (Three) Times a Day. 90 tablet 11   • pantoprazole (PROTONIX) 40 MG EC tablet Take 40 mg by mouth Daily As Needed.     • phenazopyridine (PYRIDIUM) 200 MG tablet Take 1 tablet by mouth 3 (Three) Times a Day As Needed for bladder spasms. 12 tablet 0   • promethazine (PHENERGAN) 25 MG suppository Insert 1 suppository into the rectum Every 6 (Six) Hours As Needed for Nausea or Vomiting. 12 suppository 0   • promethazine (PHENERGAN) 25 MG tablet Take 1 tablet by mouth Every 8 (Eight) Hours As Needed for Nausea or Vomiting. 30 tablet 0   • promethazine (PHENERGAN) 25 MG tablet Take 1 tablet by mouth Every 6 (Six) Hours As Needed for Nausea or Vomiting. 30 tablet 0   • sertraline (ZOLOFT) 100 MG tablet Take 150 mg by mouth daily.     • SUMAtriptan (IMITREX) 6 MG/0.5ML solution injection Inject 6 mg under the skin 1 (One) Time.       No current facility-administered medications for this visit.         Allergies:      Allergies   Allergen Reactions   • Ativan [Lorazepam] Hallucinations     confusion   • Sulfa Antibiotics Shortness Of Breath and Swelling   • Compazine [Prochlorperazine Edisylate] Hives   • Demerol [Meperidine] Hives   • Droperidol Itching   • Prochlorperazine Hives   • Toradol [Ketorolac Tromethamine] Hives and Itching   • Zofran [Ondansetron Hcl] Rash        Past Surgical History:     Past Surgical History:   Procedure Laterality Date   • ANAL SCOPE N/A 7/28/2016    Procedure: ANAL SCOPE;  Surgeon: Kael Lopez MD;  Location: [facility];  Service:    • APPENDECTOMY     • COLONOSCOPY N/A 6/30/2016     Procedure: COLONOSCOPY  CPTCODE:86772;  Surgeon: Jose Antonio Belle III, MD;  Location: [facility] OR;  Service:    • COLONOSCOPY N/A 7/7/2016    Procedure: COLONOSCOPY (97801) CPT;  Surgeon: Jose Antonio Belle III, MD;  Location: [facility] OR;  Service:    • CYSTOSCOPY RETROGRADE PYELOGRAM Bilateral 4/28/2017    Procedure: CYSTOSCOPY RETROGRADE PYELOGRAM;  Surgeon: Verenice Fink MD;  Location: [facility] OR;  Service:    • ENDOSCOPY N/A 6/30/2016    Procedure: ESOPHAGOGASTRODUODENOSCOPY WITH BIOPSY  CPTCODE:96412;  Surgeon: Jose Antonio Belle III, MD;  Location: [facility] OR;  Service:    • HEMORRHOIDECTOMY N/A 7/28/2016    Procedure: HEMORRHOID STAPLING;  Surgeon: Kael Lopez MD;  Location: [facility] OR;  Service:    • HYSTERECTOMY     • KNEE SURGERY     • SHOULDER SURGERY      3 times         Social History:     Social History     Social History   • Marital status:      Spouse name: N/A   • Number of children: N/A   • Years of education: N/A     Occupational History   • Not on file.     Social History Main Topics   • Smoking status: Former Smoker     Types: Cigarettes     Quit date: 11/1/2015   • Smokeless tobacco: Never Used   • Alcohol use No   • Drug use: Yes     Special: Marijuana   • Sexual activity: Defer     Other Topics Concern   • Not on file     Social History Narrative       Family History:     Family History   Problem Relation Age of Onset   • Crohn's disease Other    • Hypertension Other    • Diabetes Other    • Irritable bowel syndrome Other        Review of Systems:     Review of Systems    Physical Exam:     Physical Exam    Procedure:       Assessment:   No diagnosis found.  No orders of the defined types were placed in this encounter.      Plan:   IgA nephropathy we'll make a referral to nephrology  Urethral stenosis I'll set her up for dilatation           This document has been electronically signed by VERENICE FINK MD July 5, 2017 10:37 AM

## 2017-07-08 ENCOUNTER — HOSPITAL ENCOUNTER (EMERGENCY)
Facility: HOSPITAL | Age: 42
Discharge: HOME | End: 2017-07-08
Admitting: EMERGENCY MEDICINE

## 2017-07-08 VITALS
RESPIRATION RATE: 18 BRPM | TEMPERATURE: 98.2 F | WEIGHT: 168 LBS | SYSTOLIC BLOOD PRESSURE: 122 MMHG | DIASTOLIC BLOOD PRESSURE: 78 MMHG | HEART RATE: 82 BPM | OXYGEN SATURATION: 99 % | HEIGHT: 69 IN | BODY MASS INDEX: 24.88 KG/M2

## 2017-07-08 DIAGNOSIS — G43.809 OTHER MIGRAINE WITHOUT STATUS MIGRAINOSUS, NOT INTRACTABLE: Primary | ICD-10-CM

## 2017-07-08 PROCEDURE — 25010000002 PROMETHAZINE PER 50 MG: Performed by: NURSE PRACTITIONER

## 2017-07-08 PROCEDURE — 25010000002 HYDROMORPHONE PER 4 MG: Performed by: NURSE PRACTITIONER

## 2017-07-08 PROCEDURE — 96372 THER/PROPH/DIAG INJ SC/IM: CPT

## 2017-07-08 PROCEDURE — 99283 EMERGENCY DEPT VISIT LOW MDM: CPT

## 2017-07-08 RX ORDER — PROMETHAZINE HYDROCHLORIDE 25 MG/ML
12.5 INJECTION, SOLUTION INTRAMUSCULAR; INTRAVENOUS ONCE
Status: COMPLETED | OUTPATIENT
Start: 2017-07-08 | End: 2017-07-08

## 2017-07-08 RX ADMIN — HYDROMORPHONE HYDROCHLORIDE 1 MG: 1 INJECTION, SOLUTION INTRAMUSCULAR; INTRAVENOUS; SUBCUTANEOUS at 20:43

## 2017-07-08 RX ADMIN — PROMETHAZINE HYDROCHLORIDE 12.5 MG: 25 INJECTION INTRAMUSCULAR; INTRAVENOUS at 20:44

## 2017-07-09 NOTE — ED PROVIDER NOTES
Subjective   Patient is a 42 y.o. female presenting with headaches.   History provided by:  Patient   used: No    Headache   Pain location:  Generalized  Quality:  Dull  Radiates to:  Does not radiate  Severity currently:  9/10  Severity at highest:  9/10  Onset quality:  Gradual  Duration:  2 days  Timing:  Constant  Progression:  Worsening  Chronicity:  Recurrent  Similar to prior headaches: yes    Context: bright light    Context: not activity, not caffeine, not coughing, not defecating, not eating, not stress, not exposure to cold air, not intercourse, not loud noise and not straining    Relieved by:  Nothing  Worsened by:  Nothing  Ineffective treatments: Stadol nasal spray, home meds.  Associated symptoms: no abdominal pain, no back pain, no blurred vision, no eye pain, no facial pain, no fatigue, no fever, no focal weakness, no near-syncope, no neck pain, no neck stiffness, no paresthesias, no sore throat, no swollen glands, no syncope, no tingling and no URI    Risk factors: no anger, no family hx of SAH, does not have insomnia and lifestyle not sedentary        Review of Systems   Constitutional: Negative.  Negative for fatigue and fever.   HENT: Negative for sore throat.    Eyes: Negative.  Negative for blurred vision and pain.   Respiratory: Negative.    Cardiovascular: Negative.  Negative for syncope and near-syncope.   Gastrointestinal: Negative.  Negative for abdominal pain.   Endocrine: Negative.    Genitourinary: Negative.    Musculoskeletal: Negative.  Negative for back pain, neck pain and neck stiffness.   Skin: Negative.    Allergic/Immunologic: Negative.    Neurological: Positive for headaches. Negative for focal weakness and paresthesias.   Hematological: Negative.    Psychiatric/Behavioral: Negative.        Past Medical History:   Diagnosis Date   • Abdominal pain    • Abdominal swelling    • Bipolar 1 disorder    • Brain tumor     R Frontal Lobe per pt   • Brain tumor 2014    • Constipation    • DDD (degenerative disc disease), cervical 05/29/2017   • Diarrhea    • Fibromyalgia    • IBS (irritable bowel syndrome)    • Migraine    • Nausea & vomiting    • PONV (postoperative nausea and vomiting)    • PTSD (post-traumatic stress disorder)    • Rectal bleeding        Allergies   Allergen Reactions   • Ativan [Lorazepam] Hallucinations     confusion   • Sulfa Antibiotics Shortness Of Breath and Swelling   • Compazine [Prochlorperazine Edisylate] Hives   • Demerol [Meperidine] Hives   • Droperidol Itching   • Prochlorperazine Hives   • Toradol [Ketorolac Tromethamine] Hives and Itching   • Zofran [Ondansetron Hcl] Rash       Past Surgical History:   Procedure Laterality Date   • ANAL SCOPE N/A 7/28/2016    Procedure: ANAL SCOPE;  Surgeon: Kael Lopez MD;  Location: [facility] OR;  Service:    • APPENDECTOMY     • COLONOSCOPY N/A 6/30/2016    Procedure: COLONOSCOPY  CPTCODE:71576;  Surgeon: Jose Antonio Belle III, MD;  Location: [facility] OR;  Service:    • COLONOSCOPY N/A 7/7/2016    Procedure: COLONOSCOPY (80005) CPT;  Surgeon: Jose Antonio Belle III, MD;  Location: [facility] OR;  Service:    • CYSTOSCOPY RETROGRADE PYELOGRAM Bilateral 4/28/2017    Procedure: CYSTOSCOPY RETROGRADE PYELOGRAM;  Surgeon: Mu Blunt MD;  Location: [facility] OR;  Service:    • ENDOSCOPY N/A 6/30/2016    Procedure: ESOPHAGOGASTRODUODENOSCOPY WITH BIOPSY  CPTCODE:79733;  Surgeon: Jose Antonio Belle III, MD;  Location: [facility] OR;  Service:    • HEMORRHOIDECTOMY N/A 7/28/2016    Procedure: HEMORRHOID STAPLING;  Surgeon: Kael Lopez MD;  Location: [facility] OR;  Service:    • HYSTERECTOMY     • KNEE SURGERY     • SHOULDER SURGERY      3 times       Family History   Problem Relation Age of Onset   • Crohn's disease Other    • Hypertension Other    • Diabetes Other    • Irritable bowel syndrome Other        Social History     Social History   • Marital status:      Spouse name: N/A   • Number of  children: N/A   • Years of education: N/A     Social History Main Topics   • Smoking status: Former Smoker     Types: Cigarettes     Quit date: 11/1/2015   • Smokeless tobacco: Never Used   • Alcohol use No   • Drug use: Yes     Special: Marijuana   • Sexual activity: Defer     Other Topics Concern   • None     Social History Narrative           Objective   Physical Exam   Constitutional: She is oriented to person, place, and time. She appears well-developed and well-nourished.   HENT:   Head: Normocephalic.   Right Ear: External ear normal.   Left Ear: External ear normal.   Mouth/Throat: Oropharynx is clear and moist.   Eyes: EOM are normal. Pupils are equal, round, and reactive to light.   Neck: Normal range of motion. Neck supple.   Cardiovascular: Normal rate and regular rhythm.    Pulmonary/Chest: Effort normal and breath sounds normal.   Abdominal: Soft.   Musculoskeletal: Normal range of motion.   Neurological: She is alert and oriented to person, place, and time.   Skin: Skin is warm and dry.   Psychiatric: She has a normal mood and affect. Her behavior is normal.   Nursing note and vitals reviewed.      Procedures         ED Course  ED Course                  MDM    Final diagnoses:   Other migraine without status migrainosus, not intractable            Ja Suarez, APRN  07/09/17 [id number]

## 2017-07-11 ENCOUNTER — PROCEDURE VISIT (OUTPATIENT)
Dept: UROLOGY | Facility: CLINIC | Age: 42
End: 2017-07-11

## 2017-07-11 DIAGNOSIS — R39.198 DIFFICULTY URINATING: Primary | ICD-10-CM

## 2017-07-11 DIAGNOSIS — R31.29 MICROSCOPIC HEMATURIA: ICD-10-CM

## 2017-07-11 DIAGNOSIS — IMO0002 URETHRAL STENOSIS: ICD-10-CM

## 2017-07-11 LAB
BILIRUB BLD-MCNC: NEGATIVE MG/DL
CLARITY, POC: CLEAR
COLOR UR: YELLOW
GLUCOSE UR STRIP-MCNC: NEGATIVE MG/DL
KETONES UR QL: NEGATIVE
LEUKOCYTE EST, POC: ABNORMAL
NITRITE UR-MCNC: NEGATIVE MG/ML
PH UR: 5.5 [PH] (ref 5–8)
PROT UR STRIP-MCNC: ABNORMAL MG/DL
RBC # UR STRIP: NEGATIVE /UL
SP GR UR: 1.03 (ref 1–1.03)
UROBILINOGEN UR QL: NORMAL

## 2017-07-11 PROCEDURE — 96372 THER/PROPH/DIAG INJ SC/IM: CPT | Performed by: UROLOGY

## 2017-07-11 PROCEDURE — 52281 CYSTOSCOPY AND TREATMENT: CPT | Performed by: UROLOGY

## 2017-07-11 RX ORDER — GENTAMICIN SULFATE 40 MG/ML
80 INJECTION, SOLUTION INTRAMUSCULAR; INTRAVENOUS ONCE
Status: COMPLETED | OUTPATIENT
Start: 2017-07-11 | End: 2017-07-11

## 2017-07-11 RX ADMIN — GENTAMICIN SULFATE 80 MG: 40 INJECTION, SOLUTION INTRAMUSCULAR; INTRAVENOUS at 09:56

## 2017-07-11 NOTE — PROGRESS NOTES
Chief Complaint:          Chief Complaint   Patient presents with   • Urethral Stricture     Dilation for urethral stricture and possible cystoscopy.        HPI:   42 y.o. female.  42-year-old white female with IgA nephropathy.  She's here for dilatation because apparently she has severe urethral stenosis she also has low testosterone is interested in increasing her libido        Past Medical History:        Past Medical History:   Diagnosis Date   • Abdominal pain    • Abdominal swelling    • Bipolar 1 disorder    • Brain tumor     R Frontal Lobe per pt   • Brain tumor 2014   • Constipation    • DDD (degenerative disc disease), cervical 05/29/2017   • Diarrhea    • Fibromyalgia    • IBS (irritable bowel syndrome)    • Migraine    • Nausea & vomiting    • PONV (postoperative nausea and vomiting)    • PTSD (post-traumatic stress disorder)    • Rectal bleeding          Current Meds:     Current Outpatient Prescriptions   Medication Sig Dispense Refill   • azelastine (ASTEPRO) 0.15 % solution nasal spray 2 sprays into each nostril 2 (Two) Times a Day. 30 mL 0   • busPIRone (BUSPAR) 15 MG tablet Take 15 mg by mouth 3 (three) times a day        • butalbital-acetaminophen-caffeine (FIORICET) -40 MG per tablet Take 1-2 tablets by mouth Every 6 (Six) Hours As Needed for headaches or migraine. 20 tablet 0   • butorphanol (STADOL) 10 MG/ML nasal spray 1 spray into each nostril Daily.     • carbidopa-levodopa (SINEMET)  MG per tablet Take 1 tablet by mouth 3 (Three) Times a Day.     • cyclobenzaprine (FLEXERIL) 10 MG tablet Take 1 tablet by mouth 2 (Two) Times a Day As Needed for Muscle Spasms. 20 tablet 0   • divalproex (DEPAKOTE) 500 MG DR tablet Take 500 mg by mouth 2 (Two) Times a Day.     • famotidine (PEPCID) 40 MG tablet Take 40 mg by mouth 2 (Two) Times a Day As Needed for heartburn.     • HYDROcodone-acetaminophen (NORCO) 7.5-325 MG per tablet Take 1 tablet by mouth Every 6 (Six) Hours As Needed for  Moderate Pain (4-6).     • meclizine (ANTIVERT) 25 MG tablet Take 1 tablet by mouth Daily. 30 tablet 0   • ondansetron ODT (ZOFRAN-ODT) 4 MG disintegrating tablet Take 1 tablet by mouth 4 (Four) Times a Day. 15 tablet 0   • oxybutynin (DITROPAN) 5 MG tablet Take 1 tablet by mouth 3 (Three) Times a Day. 90 tablet 11   • pantoprazole (PROTONIX) 40 MG EC tablet Take 40 mg by mouth Daily As Needed.     • phenazopyridine (PYRIDIUM) 200 MG tablet Take 1 tablet by mouth 3 (Three) Times a Day As Needed for bladder spasms. 12 tablet 0   • promethazine (PHENERGAN) 25 MG suppository Insert 1 suppository into the rectum Every 6 (Six) Hours As Needed for Nausea or Vomiting. 12 suppository 0   • promethazine (PHENERGAN) 25 MG tablet Take 1 tablet by mouth Every 8 (Eight) Hours As Needed for Nausea or Vomiting. 30 tablet 0   • promethazine (PHENERGAN) 25 MG tablet Take 1 tablet by mouth Every 6 (Six) Hours As Needed for Nausea or Vomiting. 30 tablet 0   • sertraline (ZOLOFT) 100 MG tablet Take 150 mg by mouth daily.     • SUMAtriptan (IMITREX) 6 MG/0.5ML solution injection Inject 6 mg under the skin 1 (One) Time.       No current facility-administered medications for this visit.         Allergies:      Allergies   Allergen Reactions   • Ativan [Lorazepam] Hallucinations     confusion   • Sulfa Antibiotics Shortness Of Breath and Swelling   • Compazine [Prochlorperazine Edisylate] Hives   • Demerol [Meperidine] Hives   • Droperidol Itching   • Prochlorperazine Hives   • Toradol [Ketorolac Tromethamine] Hives and Itching   • Zofran [Ondansetron Hcl] Rash        Past Surgical History:     Past Surgical History:   Procedure Laterality Date   • ANAL SCOPE N/A 7/28/2016    Procedure: ANAL SCOPE;  Surgeon: Kael Lopez MD;  Location: [facility] OR;  Service:    • APPENDECTOMY     • COLONOSCOPY N/A 6/30/2016    Procedure: COLONOSCOPY  CPTCODE:54286;  Surgeon: Jose Antonio Belle III, MD;  Location: [facility] OR;  Service:    • COLONOSCOPY N/A  7/7/2016    Procedure: COLONOSCOPY (29640) CPT;  Surgeon: Jose Antonio Belle III, MD;  Location: [facility] OR;  Service:    • CYSTOSCOPY RETROGRADE PYELOGRAM Bilateral 4/28/2017    Procedure: CYSTOSCOPY RETROGRADE PYELOGRAM;  Surgeon: Mu Blunt MD;  Location: [facility] OR;  Service:    • ENDOSCOPY N/A 6/30/2016    Procedure: ESOPHAGOGASTRODUODENOSCOPY WITH BIOPSY  CPTCODE:15676;  Surgeon: Jose Antonio Belle III, MD;  Location: [facility] OR;  Service:    • HEMORRHOIDECTOMY N/A 7/28/2016    Procedure: HEMORRHOID STAPLING;  Surgeon: Kael Lopez MD;  Location: [facility] OR;  Service:    • HYSTERECTOMY     • KNEE SURGERY     • SHOULDER SURGERY      3 times         Social History:     Social History     Social History   • Marital status:      Spouse name: N/A   • Number of children: N/A   • Years of education: N/A     Occupational History   • Not on file.     Social History Main Topics   • Smoking status: Former Smoker     Types: Cigarettes     Quit date: 11/1/2015   • Smokeless tobacco: Never Used   • Alcohol use No   • Drug use: Yes     Special: Marijuana   • Sexual activity: Defer     Other Topics Concern   • Not on file     Social History Narrative       Family History:     Family History   Problem Relation Age of Onset   • Crohn's disease Other    • Hypertension Other    • Diabetes Other    • Irritable bowel syndrome Other        Review of Systems:     Review of Systems    Physical Exam:     Physical Exam    Procedure:   Patient presents today for cystourethroscopy.  After prep and drape in a sterile fashion in the low dorsal lithotomy position the urethra was gently anesthetized with 10 cc of 2% viscous Xylocaine jelly.  After an appropriate period of topical anesthesia I used the flexible cystoscope to examine the anterior through which was completely normal the ureteral orifices were visualized in normal position and configuration there were no stones, tumors or foreign bodies.  The patient was given  gentamicin as prophylaxis for the cystoscopy and released from the clinic I dilated the urethra 28 Divehi without complication is not significant urethral stenosis    Assessment:   No diagnosis found.  No orders of the defined types were placed in this encounter.      Plan:   Minimal urethral stenosis status post dilatation given gentamicin as prophylaxis   Proactive sexual desire disorder we will start her on a trial dose of testosterone        This document has been electronically signed by VERENICE FINK MD July 11, 2017 9:52 AM

## 2017-07-12 ENCOUNTER — HOSPITAL ENCOUNTER (EMERGENCY)
Facility: HOSPITAL | Age: 42
Discharge: HOME | End: 2017-07-12
Attending: EMERGENCY MEDICINE | Admitting: EMERGENCY MEDICINE

## 2017-07-12 ENCOUNTER — OFFICE VISIT (OUTPATIENT)
Dept: SURGERY | Facility: CLINIC | Age: 42
End: 2017-07-12

## 2017-07-12 ENCOUNTER — APPOINTMENT (OUTPATIENT)
Dept: GENERAL RADIOLOGY | Facility: HOSPITAL | Age: 42
End: 2017-07-12

## 2017-07-12 VITALS
HEART RATE: 87 BPM | BODY MASS INDEX: 24.88 KG/M2 | RESPIRATION RATE: 18 BRPM | DIASTOLIC BLOOD PRESSURE: 70 MMHG | HEIGHT: 69 IN | TEMPERATURE: 98.2 F | SYSTOLIC BLOOD PRESSURE: 122 MMHG | OXYGEN SATURATION: 96 % | WEIGHT: 168 LBS

## 2017-07-12 VITALS
WEIGHT: 168 LBS | HEIGHT: 69 IN | SYSTOLIC BLOOD PRESSURE: 118 MMHG | HEART RATE: 90 BPM | BODY MASS INDEX: 24.88 KG/M2 | DIASTOLIC BLOOD PRESSURE: 70 MMHG

## 2017-07-12 DIAGNOSIS — I87.8 POOR VENOUS ACCESS: Primary | ICD-10-CM

## 2017-07-12 DIAGNOSIS — J06.9 UPPER RESPIRATORY TRACT INFECTION, UNSPECIFIED TYPE: Primary | ICD-10-CM

## 2017-07-12 DIAGNOSIS — G43.911 INTRACTABLE MIGRAINE WITH STATUS MIGRAINOSUS, UNSPECIFIED MIGRAINE TYPE: ICD-10-CM

## 2017-07-12 LAB
ALBUMIN SERPL-MCNC: 4.6 G/DL (ref 3.5–5)
ALBUMIN/GLOB SERPL: 1.5 G/DL (ref 1.5–2.5)
ALP SERPL-CCNC: 75 U/L (ref 35–104)
ALT SERPL W P-5'-P-CCNC: 63 U/L (ref 10–36)
ANION GAP SERPL CALCULATED.3IONS-SCNC: 8.9 MMOL/L (ref 3.6–11.2)
AST SERPL-CCNC: 55 U/L (ref 10–30)
BACTERIA UR QL AUTO: ABNORMAL /HPF
BASOPHILS # BLD AUTO: 0.01 10*3/MM3 (ref 0–0.3)
BASOPHILS NFR BLD AUTO: 0.2 % (ref 0–2)
BILIRUB SERPL-MCNC: 1 MG/DL (ref 0.2–1.8)
BILIRUB UR QL STRIP: NEGATIVE
BUN BLD-MCNC: 13 MG/DL (ref 7–21)
BUN/CREAT SERPL: 20.6 (ref 7–25)
CALCIUM SPEC-SCNC: 9.5 MG/DL (ref 7.7–10)
CHLORIDE SERPL-SCNC: 105 MMOL/L (ref 99–112)
CLARITY UR: CLEAR
CO2 SERPL-SCNC: 27.1 MMOL/L (ref 24.3–31.9)
COLOR UR: ABNORMAL
CREAT BLD-MCNC: 0.63 MG/DL (ref 0.43–1.29)
CRP SERPL-MCNC: <0.5 MG/DL (ref 0–0.99)
DEPRECATED RDW RBC AUTO: 43.1 FL (ref 37–54)
EOSINOPHIL # BLD AUTO: 0.11 10*3/MM3 (ref 0–0.7)
EOSINOPHIL NFR BLD AUTO: 1.9 % (ref 0–5)
ERYTHROCYTE [DISTWIDTH] IN BLOOD BY AUTOMATED COUNT: 13.2 % (ref 11.5–14.5)
ERYTHROCYTE [SEDIMENTATION RATE] IN BLOOD: 7 MM/HR (ref 0–20)
GFR SERPL CREATININE-BSD FRML MDRD: 104 ML/MIN/1.73
GLOBULIN UR ELPH-MCNC: 3 GM/DL
GLUCOSE BLD-MCNC: 81 MG/DL (ref 70–110)
GLUCOSE UR STRIP-MCNC: NEGATIVE MG/DL
HCT VFR BLD AUTO: 43.3 % (ref 37–47)
HGB BLD-MCNC: 14.5 G/DL (ref 12–16)
HGB UR QL STRIP.AUTO: NEGATIVE
HYALINE CASTS UR QL AUTO: ABNORMAL /LPF
IMM GRANULOCYTES # BLD: 0 10*3/MM3 (ref 0–0.03)
IMM GRANULOCYTES NFR BLD: 0 % (ref 0–0.5)
KETONES UR QL STRIP: ABNORMAL
LEUKOCYTE ESTERASE UR QL STRIP.AUTO: ABNORMAL
LYMPHOCYTES # BLD AUTO: 2.34 10*3/MM3 (ref 1–3)
LYMPHOCYTES NFR BLD AUTO: 41.3 % (ref 21–51)
MCH RBC QN AUTO: 30.8 PG (ref 27–33)
MCHC RBC AUTO-ENTMCNC: 33.5 G/DL (ref 33–37)
MCV RBC AUTO: 91.9 FL (ref 80–94)
MONOCYTES # BLD AUTO: 0.41 10*3/MM3 (ref 0.1–0.9)
MONOCYTES NFR BLD AUTO: 7.2 % (ref 0–10)
NEUTROPHILS # BLD AUTO: 2.8 10*3/MM3 (ref 1.4–6.5)
NEUTROPHILS NFR BLD AUTO: 49.4 % (ref 30–70)
NITRITE UR QL STRIP: NEGATIVE
OSMOLALITY SERPL CALC.SUM OF ELEC: 280.4 MOSM/KG (ref 273–305)
PH UR STRIP.AUTO: 6.5 [PH] (ref 5–8)
PLATELET # BLD AUTO: 128 10*3/MM3 (ref 130–400)
PMV BLD AUTO: 11.2 FL (ref 6–10)
POTASSIUM BLD-SCNC: 3.8 MMOL/L (ref 3.5–5.3)
PROT SERPL-MCNC: 7.6 G/DL (ref 6–8)
PROT UR QL STRIP: NEGATIVE
RBC # BLD AUTO: 4.71 10*6/MM3 (ref 4.2–5.4)
RBC # UR: ABNORMAL /HPF
REF LAB TEST METHOD: ABNORMAL
SODIUM BLD-SCNC: 141 MMOL/L (ref 135–153)
SP GR UR STRIP: 1.03 (ref 1–1.03)
SQUAMOUS #/AREA URNS HPF: ABNORMAL /HPF
UROBILINOGEN UR QL STRIP: ABNORMAL
WBC NRBC COR # BLD: 5.67 10*3/MM3 (ref 4.5–12.5)
WBC UR QL AUTO: ABNORMAL /HPF

## 2017-07-12 PROCEDURE — 85652 RBC SED RATE AUTOMATED: CPT | Performed by: PHYSICIAN ASSISTANT

## 2017-07-12 PROCEDURE — 80053 COMPREHEN METABOLIC PANEL: CPT | Performed by: PHYSICIAN ASSISTANT

## 2017-07-12 PROCEDURE — 70210 X-RAY EXAM OF SINUSES: CPT

## 2017-07-12 PROCEDURE — 99283 EMERGENCY DEPT VISIT LOW MDM: CPT

## 2017-07-12 PROCEDURE — 96367 TX/PROPH/DG ADDL SEQ IV INF: CPT

## 2017-07-12 PROCEDURE — 81001 URINALYSIS AUTO W/SCOPE: CPT | Performed by: PHYSICIAN ASSISTANT

## 2017-07-12 PROCEDURE — 71010 HC CHEST PA OR AP: CPT

## 2017-07-12 PROCEDURE — 71010 XR CHEST 1 VW: CPT | Performed by: RADIOLOGY

## 2017-07-12 PROCEDURE — 25010000002 DIAZEPAM PER 5 MG

## 2017-07-12 PROCEDURE — 85025 COMPLETE CBC W/AUTO DIFF WBC: CPT | Performed by: PHYSICIAN ASSISTANT

## 2017-07-12 PROCEDURE — 96375 TX/PRO/DX INJ NEW DRUG ADDON: CPT

## 2017-07-12 PROCEDURE — 99214 OFFICE O/P EST MOD 30 MIN: CPT | Performed by: SURGERY

## 2017-07-12 PROCEDURE — 25010000002 PROMETHAZINE PER 50 MG: Performed by: PHYSICIAN ASSISTANT

## 2017-07-12 PROCEDURE — 96361 HYDRATE IV INFUSION ADD-ON: CPT

## 2017-07-12 PROCEDURE — 96365 THER/PROPH/DIAG IV INF INIT: CPT

## 2017-07-12 PROCEDURE — 86140 C-REACTIVE PROTEIN: CPT | Performed by: PHYSICIAN ASSISTANT

## 2017-07-12 PROCEDURE — 70210 X-RAY EXAM OF SINUSES: CPT | Performed by: RADIOLOGY

## 2017-07-12 PROCEDURE — 25010000002 BUTORPHANOL PER 1 MG: Performed by: PHYSICIAN ASSISTANT

## 2017-07-12 PROCEDURE — 25010000002 DEXAMETHASONE PER 1 MG: Performed by: PHYSICIAN ASSISTANT

## 2017-07-12 RX ORDER — BUTORPHANOL TARTRATE 1 MG/ML
1 INJECTION, SOLUTION INTRAMUSCULAR; INTRAVENOUS ONCE
Status: COMPLETED | OUTPATIENT
Start: 2017-07-12 | End: 2017-07-12

## 2017-07-12 RX ORDER — SODIUM CHLORIDE 0.9 % (FLUSH) 0.9 %
1-10 SYRINGE (ML) INJECTION AS NEEDED
Status: CANCELLED | OUTPATIENT
Start: 2017-07-28

## 2017-07-12 RX ORDER — SODIUM CHLORIDE 0.9 % (FLUSH) 0.9 %
10 SYRINGE (ML) INJECTION AS NEEDED
Status: DISCONTINUED | OUTPATIENT
Start: 2017-07-12 | End: 2017-07-13 | Stop reason: HOSPADM

## 2017-07-12 RX ORDER — DIAZEPAM 5 MG/ML
INJECTION, SOLUTION INTRAMUSCULAR; INTRAVENOUS
Status: COMPLETED
Start: 2017-07-12 | End: 2017-07-12

## 2017-07-12 RX ORDER — DIAZEPAM 5 MG/ML
5 INJECTION, SOLUTION INTRAMUSCULAR; INTRAVENOUS ONCE
Status: COMPLETED | OUTPATIENT
Start: 2017-07-12 | End: 2017-07-12

## 2017-07-12 RX ADMIN — SODIUM CHLORIDE 1000 ML: 0.9 INJECTION, SOLUTION INTRAVENOUS at 19:57

## 2017-07-12 RX ADMIN — DEXAMETHASONE SODIUM PHOSPHATE 10 MG: 4 INJECTION, SOLUTION INTRAMUSCULAR; INTRAVENOUS at 20:38

## 2017-07-12 RX ADMIN — PROMETHAZINE HYDROCHLORIDE 12.5 MG: 25 INJECTION INTRAMUSCULAR; INTRAVENOUS at 19:59

## 2017-07-12 RX ADMIN — DIAZEPAM 5 MG: 5 INJECTION, SOLUTION INTRAMUSCULAR; INTRAVENOUS at 19:58

## 2017-07-12 RX ADMIN — BUTORPHANOL TARTRATE 1 MG: 1 INJECTION, SOLUTION INTRAMUSCULAR; INTRAVENOUS at 21:54

## 2017-07-13 ENCOUNTER — DOCUMENTATION (OUTPATIENT)
Dept: UROLOGY | Facility: CLINIC | Age: 42
End: 2017-07-13

## 2017-07-13 NOTE — ED PROVIDER NOTES
Subjective   Patient is a 42 y.o. female presenting with headaches.   History provided by:  Patient   used: No    Headache   Pain location:  Generalized  Quality:  Stabbing  Radiates to:  Does not radiate  Timing:  Constant  Progression:  Worsening  Chronicity:  Chronic  Similar to prior headaches: yes    Context: bright light, emotional stress and loud noise    Relieved by:  Nothing  Worsened by:  Activity, light, sound and neck movement  Ineffective treatments:  Prescription medications, acetaminophen and NSAIDs  Associated symptoms: congestion and URI    Associated symptoms: no abdominal pain, no fever, no neck pain, no neck stiffness and no sore throat    Congestion:     Location:  Nasal    Interferes with sleep: yes      Interferes with eating/drinking: no    Risk factors: insomnia and sedentary lifestyle    Risk factors: no anger        Review of Systems   Constitutional: Negative.  Negative for fever.   HENT: Positive for congestion and rhinorrhea. Negative for facial swelling and sore throat.    Respiratory: Negative.    Cardiovascular: Negative.  Negative for chest pain.   Gastrointestinal: Negative.  Negative for abdominal pain.   Endocrine: Negative.    Genitourinary: Negative.  Negative for dysuria.   Musculoskeletal: Negative for neck pain and neck stiffness.   Skin: Negative.    Neurological: Positive for headaches.   Psychiatric/Behavioral: Negative.    All other systems reviewed and are negative.      Past Medical History:   Diagnosis Date   • Abdominal pain    • Abdominal swelling    • Bipolar 1 disorder    • Brain tumor     R Frontal Lobe per pt   • Brain tumor 2014   • Constipation    • DDD (degenerative disc disease), cervical 05/29/2017   • Diarrhea    • Fibromyalgia    • IBS (irritable bowel syndrome)    • Migraine    • Nausea & vomiting    • PONV (postoperative nausea and vomiting)    • PTSD (post-traumatic stress disorder)    • Rectal bleeding        Allergies   Allergen  Reactions   • Ativan [Lorazepam] Hallucinations     confusion   • Sulfa Antibiotics Shortness Of Breath and Swelling   • Compazine [Prochlorperazine Edisylate] Hives   • Demerol [Meperidine] Hives   • Droperidol Itching   • Prochlorperazine Hives   • Toradol [Ketorolac Tromethamine] Hives and Itching   • Zofran [Ondansetron Hcl] Rash       Past Surgical History:   Procedure Laterality Date   • ANAL SCOPE N/A 7/28/2016    Procedure: ANAL SCOPE;  Surgeon: Kael Lopez MD;  Location: [facility] OR;  Service:    • APPENDECTOMY     • COLONOSCOPY N/A 6/30/2016    Procedure: COLONOSCOPY  CPTCODE:23093;  Surgeon: Jose Antonio Belle III, MD;  Location: [facility] OR;  Service:    • COLONOSCOPY N/A 7/7/2016    Procedure: COLONOSCOPY (74614) CPT;  Surgeon: Jose Antonio Belle III, MD;  Location: [facility] OR;  Service:    • CYSTOSCOPY RETROGRADE PYELOGRAM Bilateral 4/28/2017    Procedure: CYSTOSCOPY RETROGRADE PYELOGRAM;  Surgeon: Mu Blunt MD;  Location: [facility] OR;  Service:    • ENDOSCOPY N/A 6/30/2016    Procedure: ESOPHAGOGASTRODUODENOSCOPY WITH BIOPSY  CPTCODE:[id number];  Surgeon: Jose Antonio Belle III, MD;  Location: [facility] OR;  Service:    • HEMORRHOIDECTOMY N/A 7/28/2016    Procedure: HEMORRHOID STAPLING;  Surgeon: Kael Lopez MD;  Location: [facility] OR;  Service:    • HYSTERECTOMY     • KNEE SURGERY     • SHOULDER SURGERY      3 times       Family History   Problem Relation Age of Onset   • Crohn's disease Other    • Hypertension Other    • Diabetes Other    • Irritable bowel syndrome Other        Social History     Social History   • Marital status:      Spouse name: N/A   • Number of children: N/A   • Years of education: N/A     Social History Main Topics   • Smoking status: Former Smoker     Types: Cigarettes     Quit date: 11/1/2015   • Smokeless tobacco: Never Used   • Alcohol use No   • Drug use: Yes     Special: Marijuana   • Sexual activity: Defer     Other Topics Concern   • None     Social  History Narrative           Objective   Physical Exam   Constitutional: She is oriented to person, place, and time. She appears well-developed and well-nourished. No distress.   HENT:   Head: Normocephalic and atraumatic.   Right Ear: External ear normal.   Left Ear: External ear normal.   Nose: Rhinorrhea present. No sinus tenderness. No epistaxis. Right sinus exhibits no maxillary sinus tenderness and no frontal sinus tenderness. Left sinus exhibits no maxillary sinus tenderness and no frontal sinus tenderness.   Eyes: Conjunctivae and EOM are normal. Pupils are equal, round, and reactive to light.   Neck: Normal range of motion. Neck supple. No JVD present. No tracheal deviation present.   Cardiovascular: Normal rate, regular rhythm and normal heart sounds.    No murmur heard.  Pulmonary/Chest: Effort normal and breath sounds normal. No respiratory distress. She has no wheezes.   Abdominal: Soft. Bowel sounds are normal. There is no tenderness.   Musculoskeletal: Normal range of motion. She exhibits no edema or deformity.   Neurological: She is alert and oriented to person, place, and time. She has normal strength. She is not disoriented. No cranial nerve deficit or sensory deficit. She displays a negative Romberg sign.   Skin: Skin is warm and dry. No rash noted. She is not diaphoretic. No erythema. No pallor.   Psychiatric: Her speech is normal and behavior is normal. Thought content normal. Her mood appears anxious.   Nursing note and vitals reviewed.      Procedures         ED Course  ED Course   Value Comment By Time   XR Chest 1 View Per Dr. Bonner there is no acute cardiopulmonary disease evidenced. Han Varghese PA-C 07/12 2104   XR Sinus [place] View Only Per Dr. Bonner mild sinusitis evidenced. Han Varghese PA-C 07/12 2104    Pt informed that there is very minimal evidence of sinusitis present and that no antibiotics are necessary. Han Varghese PA-C 07/13 8218                   MDM    Final diagnoses:   Upper respiratory tract infection, unspecified type   Intractable migraine with status migrainosus, unspecified migraine type            Han Varghese PA-C  07/13/17 0419

## 2017-07-13 NOTE — PROGRESS NOTES
Subjective   Susanna Camilo is a 42 y.o. female is being seen for consultation today at the request of Mabel ABEBE Comments: 42-year-old female with benign brain tumor that causes seizures and migraines.  She requires frequent lab work and hospital admissions for therapy.  The patient is a poor venous access patient with difficulty requiring probe lied venous access and central lines frequently.  Due to this and the frequent need for lab work on admission she has been sent for evaluation for port placement.  No previous neck surgeries of central lines of been placed.  She has jugular venous distention that is mild but no evidence of underlying heart disease.  She is on no anticoagulation.      Past Medical History:   Diagnosis Date   • Abdominal pain    • Abdominal swelling    • Bipolar 1 disorder    • Brain tumor     R Frontal Lobe per pt   • Brain tumor 2014   • Constipation    • DDD (degenerative disc disease), cervical 05/29/2017   • Diarrhea    • Fibromyalgia    • IBS (irritable bowel syndrome)    • Migraine    • Nausea & vomiting    • PONV (postoperative nausea and vomiting)    • PTSD (post-traumatic stress disorder)    • Rectal bleeding        Family History   Problem Relation Age of Onset   • Crohn's disease Other    • Hypertension Other    • Diabetes Other    • Irritable bowel syndrome Other        Social History     Social History   • Marital status:      Spouse name: N/A   • Number of children: N/A   • Years of education: N/A     Occupational History   • Not on file.     Social History Main Topics   • Smoking status: Former Smoker     Types: Cigarettes     Quit date: 11/1/2015   • Smokeless tobacco: Never Used   • Alcohol use No   • Drug use: Yes     Special: Marijuana   • Sexual activity: Defer     Other Topics Concern   • Not on file     Social History Narrative       Past Surgical History:   Procedure Laterality Date   • ANAL SCOPE N/A 7/28/2016    Procedure: ANAL SCOPE;  Surgeon:  Kael Lopez MD;  Location: [facility] OR;  Service:    • APPENDECTOMY     • COLONOSCOPY N/A 6/30/2016    Procedure: COLONOSCOPY  CPTCODE:27572;  Surgeon: Jose Antonio Belle III, MD;  Location: [facility] OR;  Service:    • COLONOSCOPY N/A 7/7/2016    Procedure: COLONOSCOPY (02216) CPT;  Surgeon: Jose Antonio Belle III, MD;  Location: [facility] OR;  Service:    • CYSTOSCOPY RETROGRADE PYELOGRAM Bilateral 4/28/2017    Procedure: CYSTOSCOPY RETROGRADE PYELOGRAM;  Surgeon: Mu Blunt MD;  Location: [facility] OR;  Service:    • ENDOSCOPY N/A 6/30/2016    Procedure: ESOPHAGOGASTRODUODENOSCOPY WITH BIOPSY  CPTCODE:38546;  Surgeon: Jose Antonio Belle III, MD;  Location: [facility] OR;  Service:    • HEMORRHOIDECTOMY N/A 7/28/2016    Procedure: HEMORRHOID STAPLING;  Surgeon: Kael Lopez MD;  Location: [facility] OR;  Service:    • HYSTERECTOMY     • KNEE SURGERY     • SHOULDER SURGERY      3 times       The following portions of the patient's history were reviewed and updated as appropriate: allergies, current medications, past family history, past medical history, past social history, past surgical history and problem list.    Review of Systems   Constitutional: Negative for activity change, appetite change, chills and fever.   HENT: Negative for sore throat and trouble swallowing.    Eyes: Negative for visual disturbance.   Respiratory: Negative for cough and shortness of breath.    Cardiovascular: Negative for chest pain and palpitations.   Gastrointestinal: Negative for abdominal distention, abdominal pain, blood in stool, constipation, diarrhea, nausea and vomiting.   Endocrine: Negative for cold intolerance and heat intolerance.   Genitourinary: Negative for dysuria.   Musculoskeletal: Negative for joint swelling.   Skin: Negative for color change, rash and wound.   Allergic/Immunologic: Negative for immunocompromised state.   Neurological: Positive for seizures and headaches. Negative for dizziness and weakness.  "  Hematological: Negative for adenopathy. Does not bruise/bleed easily.   Psychiatric/Behavioral: Negative for agitation and confusion.         /70  Pulse 90  Ht 69\" (175.3 cm)  Wt 168 lb (76.2 kg)  LMP  (LMP Unknown)  BMI 24.81 kg/m2  Objective   Physical Exam   Constitutional: She is oriented to person, place, and time. She appears well-developed.   HENT:   Head: Normocephalic and atraumatic.   Mouth/Throat: Mucous membranes are normal.   Eyes: Conjunctivae are normal. Pupils are equal, round, and reactive to light.   Neck: Neck supple. No JVD present. No tracheal deviation present. No thyromegaly present.   Cardiovascular: Normal rate and regular rhythm.  Exam reveals no gallop and no friction rub.    No murmur heard.  Pulmonary/Chest: Effort normal and breath sounds normal.   Abdominal: Soft. She exhibits no distension. There is no splenomegaly or hepatomegaly. There is no tenderness. No hernia.   Musculoskeletal: Normal range of motion. She exhibits no deformity.   Neurological: She is alert and oriented to person, place, and time.   Skin: Skin is warm and dry.   Psychiatric: She has a normal mood and affect.         Susanna was seen today for port consult.    Diagnoses and all orders for this visit:    Poor venous access  -     Case Request; Standing  -     sodium chloride 0.9 % flush 1-10 mL; Infuse 1-10 mL into a venous catheter As Needed for Line Care.  -     ceFAZolin (ANCEF) IVPB (duplex) 2 g; Infuse 2,000 mg into a venous catheter 1 (One) Time.  -     Case Request    Other orders  -     Follow anesthesia standing orders.  -     Provide instructions to patient on NPO status  -     Clorhexidine skin prep  -     Follow Anesthesia Guidelines / Standing Orders; Standing  -     Verify NPO Status; Standing  -     Obtain informed consent; Standing  -     SCD (sequential compression device)- to be placed on patient in Pre-op; Standing  -     Insert Peripheral IV; Standing  -     Saline Lock & Maintain " IV Access; Standing        Assessment     42-year-old female who requires lab work frequently due to history of migraines and multiple other medical disorders.  She's been deferred due to her poor venous access for long-term port placement.  Risks and benefits including risk of pneumothorax, infection, and bleeding of been discussed with the patient and she is agreeable to proceed with port placement.

## 2017-07-13 NOTE — PROGRESS NOTES
No prior authorization is required for testosterone injections in office. Reference # [id number]

## 2017-07-18 ENCOUNTER — APPOINTMENT (OUTPATIENT)
Dept: GENERAL RADIOLOGY | Facility: HOSPITAL | Age: 42
End: 2017-07-18

## 2017-07-18 ENCOUNTER — HOSPITAL ENCOUNTER (EMERGENCY)
Facility: HOSPITAL | Age: 42
Discharge: HOME | End: 2017-07-19
Attending: EMERGENCY MEDICINE | Admitting: EMERGENCY MEDICINE

## 2017-07-18 DIAGNOSIS — R05.9 COUGH: ICD-10-CM

## 2017-07-18 DIAGNOSIS — J06.9 ACUTE UPPER RESPIRATORY INFECTION: Primary | ICD-10-CM

## 2017-07-18 LAB
ALBUMIN SERPL-MCNC: 4.2 G/DL (ref 3.5–5)
ALBUMIN/GLOB SERPL: 1.4 G/DL (ref 1.5–2.5)
ALP SERPL-CCNC: 71 U/L (ref 35–104)
ALT SERPL W P-5'-P-CCNC: 52 U/L (ref 10–36)
ANION GAP SERPL CALCULATED.3IONS-SCNC: 4.3 MMOL/L (ref 3.6–11.2)
AST SERPL-CCNC: 49 U/L (ref 10–30)
BASOPHILS # BLD AUTO: 0.01 10*3/MM3 (ref 0–0.3)
BASOPHILS NFR BLD AUTO: 0.2 % (ref 0–2)
BILIRUB SERPL-MCNC: 0.4 MG/DL (ref 0.2–1.8)
BUN BLD-MCNC: 6 MG/DL (ref 7–21)
BUN/CREAT SERPL: 8 (ref 7–25)
CALCIUM SPEC-SCNC: 9.7 MG/DL (ref 7.7–10)
CHLORIDE SERPL-SCNC: 105 MMOL/L (ref 99–112)
CK MB SERPL-CCNC: <0.18 NG/ML (ref 0–5)
CK MB SERPL-RTO: NORMAL % (ref 0–3)
CK SERPL-CCNC: 31 U/L (ref 24–173)
CO2 SERPL-SCNC: 32.7 MMOL/L (ref 24.3–31.9)
CREAT BLD-MCNC: 0.75 MG/DL (ref 0.43–1.29)
DEPRECATED RDW RBC AUTO: 43.9 FL (ref 37–54)
EOSINOPHIL # BLD AUTO: 0.15 10*3/MM3 (ref 0–0.7)
EOSINOPHIL NFR BLD AUTO: 2.8 % (ref 0–5)
ERYTHROCYTE [DISTWIDTH] IN BLOOD BY AUTOMATED COUNT: 13.1 % (ref 11.5–14.5)
GFR SERPL CREATININE-BSD FRML MDRD: 85 ML/MIN/1.73
GLOBULIN UR ELPH-MCNC: 3 GM/DL
GLUCOSE BLD-MCNC: 99 MG/DL (ref 70–110)
HCT VFR BLD AUTO: 43.8 % (ref 37–47)
HGB BLD-MCNC: 14.3 G/DL (ref 12–16)
IMM GRANULOCYTES # BLD: 0.01 10*3/MM3 (ref 0–0.03)
IMM GRANULOCYTES NFR BLD: 0.2 % (ref 0–0.5)
LYMPHOCYTES # BLD AUTO: 2.6 10*3/MM3 (ref 1–3)
LYMPHOCYTES NFR BLD AUTO: 48.7 % (ref 21–51)
MCH RBC QN AUTO: 30.9 PG (ref 27–33)
MCHC RBC AUTO-ENTMCNC: 32.6 G/DL (ref 33–37)
MCV RBC AUTO: 94.6 FL (ref 80–94)
MONOCYTES # BLD AUTO: 0.5 10*3/MM3 (ref 0.1–0.9)
MONOCYTES NFR BLD AUTO: 9.4 % (ref 0–10)
NEUTROPHILS # BLD AUTO: 2.07 10*3/MM3 (ref 1.4–6.5)
NEUTROPHILS NFR BLD AUTO: 38.7 % (ref 30–70)
OSMOLALITY SERPL CALC.SUM OF ELEC: 280.8 MOSM/KG (ref 273–305)
PLATELET # BLD AUTO: 114 10*3/MM3 (ref 130–400)
PMV BLD AUTO: 11 FL (ref 6–10)
POTASSIUM BLD-SCNC: 3.9 MMOL/L (ref 3.5–5.3)
PROT SERPL-MCNC: 7.2 G/DL (ref 6–8)
RBC # BLD AUTO: 4.63 10*6/MM3 (ref 4.2–5.4)
SODIUM BLD-SCNC: 142 MMOL/L (ref 135–153)
TROPONIN I SERPL-MCNC: <0.006 NG/ML
WBC NRBC COR # BLD: 5.34 10*3/MM3 (ref 4.5–12.5)

## 2017-07-18 PROCEDURE — 85025 COMPLETE CBC W/AUTO DIFF WBC: CPT | Performed by: EMERGENCY MEDICINE

## 2017-07-18 PROCEDURE — 80053 COMPREHEN METABOLIC PANEL: CPT | Performed by: EMERGENCY MEDICINE

## 2017-07-18 PROCEDURE — 94640 AIRWAY INHALATION TREATMENT: CPT

## 2017-07-18 PROCEDURE — 93005 ELECTROCARDIOGRAM TRACING: CPT

## 2017-07-18 PROCEDURE — 82550 ASSAY OF CK (CPK): CPT | Performed by: EMERGENCY MEDICINE

## 2017-07-18 PROCEDURE — 94799 UNLISTED PULMONARY SVC/PX: CPT

## 2017-07-18 PROCEDURE — 71010 HC CHEST PA OR AP: CPT

## 2017-07-18 PROCEDURE — 84484 ASSAY OF TROPONIN QUANT: CPT | Performed by: EMERGENCY MEDICINE

## 2017-07-18 PROCEDURE — 99283 EMERGENCY DEPT VISIT LOW MDM: CPT

## 2017-07-18 PROCEDURE — 82553 CREATINE MB FRACTION: CPT | Performed by: EMERGENCY MEDICINE

## 2017-07-18 PROCEDURE — 71010 XR CHEST 1 VW: CPT | Performed by: RADIOLOGY

## 2017-07-18 RX ORDER — BENZONATATE 100 MG/1
200 CAPSULE ORAL ONCE
Status: COMPLETED | OUTPATIENT
Start: 2017-07-18 | End: 2017-07-19

## 2017-07-18 RX ORDER — METHYLPREDNISOLONE SODIUM SUCCINATE 125 MG/2ML
125 INJECTION, POWDER, LYOPHILIZED, FOR SOLUTION INTRAMUSCULAR; INTRAVENOUS ONCE
Status: COMPLETED | OUTPATIENT
Start: 2017-07-18 | End: 2017-07-19

## 2017-07-18 RX ORDER — IPRATROPIUM BROMIDE AND ALBUTEROL SULFATE 2.5; .5 MG/3ML; MG/3ML
3 SOLUTION RESPIRATORY (INHALATION) ONCE
Status: COMPLETED | OUTPATIENT
Start: 2017-07-18 | End: 2017-07-18

## 2017-07-18 RX ADMIN — IPRATROPIUM BROMIDE AND ALBUTEROL SULFATE 3 ML: .5; 3 SOLUTION RESPIRATORY (INHALATION) at 23:46

## 2017-07-19 VITALS
WEIGHT: 168 LBS | DIASTOLIC BLOOD PRESSURE: 78 MMHG | HEART RATE: 98 BPM | SYSTOLIC BLOOD PRESSURE: 120 MMHG | OXYGEN SATURATION: 98 % | BODY MASS INDEX: 24.88 KG/M2 | RESPIRATION RATE: 18 BRPM | TEMPERATURE: 98 F | HEIGHT: 69 IN

## 2017-07-19 LAB
HOLD SPECIMEN: NORMAL
HOLD SPECIMEN: NORMAL
WHOLE BLOOD HOLD SPECIMEN: NORMAL
WHOLE BLOOD HOLD SPECIMEN: NORMAL

## 2017-07-19 PROCEDURE — 25010000002 METHYLPREDNISOLONE PER 125 MG: Performed by: PHYSICIAN ASSISTANT

## 2017-07-19 PROCEDURE — 25010000002 BUTORPHANOL PER 1 MG: Performed by: PHYSICIAN ASSISTANT

## 2017-07-19 PROCEDURE — 96374 THER/PROPH/DIAG INJ IV PUSH: CPT

## 2017-07-19 PROCEDURE — 96375 TX/PRO/DX INJ NEW DRUG ADDON: CPT

## 2017-07-19 RX ORDER — GUAIFENESIN 600 MG/1
1200 TABLET, EXTENDED RELEASE ORAL 2 TIMES DAILY
Qty: 20 TABLET | Refills: 0 | Status: SHIPPED | OUTPATIENT
Start: 2017-07-19 | End: 2018-03-22 | Stop reason: HOSPADM

## 2017-07-19 RX ORDER — AZITHROMYCIN 250 MG/1
TABLET, FILM COATED ORAL
Qty: 6 TABLET | Refills: 0 | Status: SHIPPED | OUTPATIENT
Start: 2017-07-19 | End: 2017-07-27

## 2017-07-19 RX ORDER — FLUTICASONE PROPIONATE 50 MCG
2 SPRAY, SUSPENSION (ML) NASAL DAILY
Qty: 1 EACH | Refills: 0 | Status: SHIPPED | OUTPATIENT
Start: 2017-07-19 | End: 2017-08-18

## 2017-07-19 RX ORDER — CETIRIZINE HYDROCHLORIDE 10 MG/1
10 TABLET ORAL DAILY
Qty: 30 TABLET | Refills: 0 | Status: SHIPPED | OUTPATIENT
Start: 2017-07-19 | End: 2021-06-20

## 2017-07-19 RX ORDER — BENZONATATE 200 MG/1
200 CAPSULE ORAL 3 TIMES DAILY PRN
Qty: 30 CAPSULE | Refills: 0 | Status: SHIPPED | OUTPATIENT
Start: 2017-07-19 | End: 2018-03-22 | Stop reason: HOSPADM

## 2017-07-19 RX ADMIN — BENZONATATE 200 MG: 100 CAPSULE ORAL at 00:43

## 2017-07-19 RX ADMIN — METHYLPREDNISOLONE SODIUM SUCCINATE 125 MG: 125 INJECTION, POWDER, FOR SOLUTION INTRAMUSCULAR; INTRAVENOUS at 00:45

## 2017-07-19 RX ADMIN — BUTORPHANOL TARTRATE 1 MG: 2 INJECTION, SOLUTION INTRAMUSCULAR; INTRAVENOUS at 00:45

## 2017-07-19 NOTE — ED PROVIDER NOTES
Subjective   Patient is a 42 y.o. female presenting with URI.   History provided by:  Patient   used: No    URI   Presenting symptoms: congestion, cough and rhinorrhea    Severity:  Moderate  Onset quality:  Sudden  Duration:  1 week  Timing:  Constant  Progression:  Worsening  Chronicity:  New  Relieved by:  Nothing  Worsened by:  Nothing  Ineffective treatments: patient reports that she has had 2 doses of rocephin with no improvment.   Associated symptoms: headaches    Risk factors: not elderly, no chronic cardiac disease, no chronic kidney disease, no chronic respiratory disease, no diabetes mellitus, no immunosuppression, no recent illness, no recent travel and no sick contacts        Review of Systems   Constitutional: Negative.    HENT: Positive for congestion and rhinorrhea.    Eyes: Negative.    Respiratory: Positive for cough.    Cardiovascular: Negative.    Gastrointestinal: Negative.    Endocrine: Negative.    Genitourinary: Negative.    Musculoskeletal: Negative.    Skin: Negative.    Allergic/Immunologic: Negative.    Neurological: Positive for headaches.   Hematological: Negative.    Psychiatric/Behavioral: Negative.    All other systems reviewed and are negative.      Past Medical History:   Diagnosis Date   • Abdominal pain    • Abdominal swelling    • Bipolar 1 disorder    • Brain tumor     R Frontal Lobe per pt   • Brain tumor 2014   • Constipation    • DDD (degenerative disc disease), cervical 05/29/2017   • Diarrhea    • Fibromyalgia    • IBS (irritable bowel syndrome)    • Migraine    • Nausea & vomiting    • PONV (postoperative nausea and vomiting)    • PTSD (post-traumatic stress disorder)    • Rectal bleeding        Allergies   Allergen Reactions   • Ativan [Lorazepam] Hallucinations     confusion   • Sulfa Antibiotics Shortness Of Breath and Swelling   • Compazine [Prochlorperazine Edisylate] Hives   • Demerol [Meperidine] Hives   • Droperidol Itching   • Prochlorperazine  Hives   • Toradol [Ketorolac Tromethamine] Hives and Itching   • Zofran [Ondansetron Hcl] Rash       Past Surgical History:   Procedure Laterality Date   • ANAL SCOPE N/A 7/28/2016    Procedure: ANAL SCOPE;  Surgeon: Kael Lopez MD;  Location: [facility] OR;  Service:    • APPENDECTOMY     • COLONOSCOPY N/A 6/30/2016    Procedure: COLONOSCOPY  CPTCODE:83909;  Surgeon: Jose Antonio Belle III, MD;  Location: [facility] OR;  Service:    • COLONOSCOPY N/A 7/7/2016    Procedure: COLONOSCOPY (71508) CPT;  Surgeon: Jose Antonio Belle III, MD;  Location: [facility] OR;  Service:    • CYSTOSCOPY RETROGRADE PYELOGRAM Bilateral 4/28/2017    Procedure: CYSTOSCOPY RETROGRADE PYELOGRAM;  Surgeon: Mu Blunt MD;  Location: [facility] OR;  Service:    • ENDOSCOPY N/A 6/30/2016    Procedure: ESOPHAGOGASTRODUODENOSCOPY WITH BIOPSY  CPTCODE:76364;  Surgeon: Jose Antonio Belle III, MD;  Location: [facility] OR;  Service:    • HEMORRHOIDECTOMY N/A 7/28/2016    Procedure: HEMORRHOID STAPLING;  Surgeon: Kael Lopez MD;  Location: [facility] OR;  Service:    • HYSTERECTOMY     • KNEE SURGERY     • SHOULDER SURGERY      3 times       Family History   Problem Relation Age of Onset   • Crohn's disease Other    • Hypertension Other    • Diabetes Other    • Irritable bowel syndrome Other        Social History     Social History   • Marital status:      Spouse name: N/A   • Number of children: N/A   • Years of education: N/A     Social History Main Topics   • Smoking status: Former Smoker     Types: Cigarettes     Quit date: 11/1/2015   • Smokeless tobacco: Never Used   • Alcohol use No   • Drug use: Yes     Special: Marijuana   • Sexual activity: Defer     Other Topics Concern   • None     Social History Narrative           Objective   Physical Exam   Constitutional: She is oriented to person, place, and time. She appears well-developed and well-nourished.   HENT:   Head: Normocephalic and atraumatic.   Right Ear: External ear normal.    Left Ear: External ear normal.   Nose: Nose normal.   Mouth/Throat: Oropharynx is clear and moist.   Eyes: Conjunctivae and EOM are normal. Pupils are equal, round, and reactive to light.   Neck: Normal range of motion. Neck supple.   Cardiovascular: Normal rate, regular rhythm, normal heart sounds and intact distal pulses.    Pulmonary/Chest: Effort normal and breath sounds normal.   Abdominal: Soft. Bowel sounds are normal.   Musculoskeletal: Normal range of motion.   Neurological: She is alert and oriented to person, place, and time.   Skin: Skin is warm and dry.   Psychiatric: She has a normal mood and affect. Her behavior is normal. Judgment and thought content normal.   Nursing note and vitals reviewed.      Procedures         ED Course  ED Course   Value Comment By Time   ECG 12 Lead 2121- Reviewed by Dr. Xavier, rate 100, NSR, no ischemia TAMI Santos 07/18 2341            HEART Score  History: Slightly suspicious (+0)  ECG: Normal (+0)  Age: Less than 45 (+0)  Risk Factors: No risk factors known (+0)  Troponin: Normal limit or lower (+0)  Total: 0         MDM    Final diagnoses:   Acute upper respiratory infection   Cough            TAMI Santos  07/19/17 0010       TAMI Santos  07/19/17 0013

## 2017-07-26 ENCOUNTER — HOSPITAL ENCOUNTER (EMERGENCY)
Facility: HOSPITAL | Age: 42
Discharge: HOME | End: 2017-07-26
Attending: EMERGENCY MEDICINE | Admitting: EMERGENCY MEDICINE

## 2017-07-26 ENCOUNTER — APPOINTMENT (OUTPATIENT)
Dept: GENERAL RADIOLOGY | Facility: HOSPITAL | Age: 42
End: 2017-07-26

## 2017-07-26 ENCOUNTER — APPOINTMENT (OUTPATIENT)
Dept: PREADMISSION TESTING | Facility: HOSPITAL | Age: 42
End: 2017-07-26

## 2017-07-26 VITALS
HEART RATE: 98 BPM | DIASTOLIC BLOOD PRESSURE: 69 MMHG | WEIGHT: 168 LBS | RESPIRATION RATE: 18 BRPM | BODY MASS INDEX: 24.88 KG/M2 | HEIGHT: 69 IN | SYSTOLIC BLOOD PRESSURE: 106 MMHG | OXYGEN SATURATION: 98 % | TEMPERATURE: 97.8 F

## 2017-07-26 DIAGNOSIS — S46.912A SHOULDER STRAIN, LEFT, INITIAL ENCOUNTER: Primary | ICD-10-CM

## 2017-07-26 PROCEDURE — 99283 EMERGENCY DEPT VISIT LOW MDM: CPT

## 2017-07-26 PROCEDURE — 73030 X-RAY EXAM OF SHOULDER: CPT | Performed by: RADIOLOGY

## 2017-07-26 PROCEDURE — 73030 X-RAY EXAM OF SHOULDER: CPT

## 2017-07-26 PROCEDURE — 96372 THER/PROPH/DIAG INJ SC/IM: CPT

## 2017-07-26 PROCEDURE — 25010000002 MORPHINE PER 10 MG: Performed by: EMERGENCY MEDICINE

## 2017-07-26 RX ORDER — MORPHINE SULFATE 2 MG/ML
2 INJECTION, SOLUTION INTRAMUSCULAR; INTRAVENOUS ONCE
Status: COMPLETED | OUTPATIENT
Start: 2017-07-26 | End: 2017-07-26

## 2017-07-26 RX ORDER — HYDROCODONE BITARTRATE AND ACETAMINOPHEN 5; 325 MG/1; MG/1
1 TABLET ORAL ONCE
Status: COMPLETED | OUTPATIENT
Start: 2017-07-26 | End: 2017-07-26

## 2017-07-26 RX ADMIN — HYDROCODONE BITARTRATE AND ACETAMINOPHEN 1 TABLET: 5; 325 TABLET ORAL at 15:43

## 2017-07-26 RX ADMIN — MORPHINE SULFATE 2 MG: 2 INJECTION, SOLUTION INTRAMUSCULAR; INTRAVENOUS at 17:34

## 2017-07-26 NOTE — ED PROVIDER NOTES
Subjective   Patient is a 42 y.o. female presenting with shoulder problem.   History provided by:  Patient   used: No    Shoulder Problem   Location:  Shoulder  Shoulder location:  L shoulder  Injury: yes    Time since incident:  1 day  Mechanism of injury: fall    Fall:     Fall occurred:  Walking    Impact surface:  Athletic surface    Entrapped after fall: no    Pain details:     Quality:  Aching    Radiates to:  L shoulder    Severity:  Moderate    Onset quality:  Sudden    Duration:  1 week    Timing:  Constant    Progression:  Worsening  Dislocation: no    Foreign body present:  No foreign bodies  Tetanus status:  Unknown  Prior injury to area:  No  Relieved by:  Nothing  Worsened by:  Nothing  Ineffective treatments:  None tried  Associated symptoms: neck pain and swelling    Associated symptoms: no back pain and no decreased range of motion    Risk factors: no concern for non-accidental trauma, no known bone disorder and no frequent fractures        Review of Systems   Musculoskeletal: Positive for joint swelling, myalgias and neck pain. Negative for back pain.   All other systems reviewed and are negative.      Past Medical History:   Diagnosis Date   • Abdominal pain    • Abdominal swelling    • Bipolar 1 disorder    • Brain tumor     R Frontal Lobe per pt   • Brain tumor 2014   • Constipation    • DDD (degenerative disc disease), cervical 05/29/2017   • Diarrhea    • Fibromyalgia    • IBS (irritable bowel syndrome)    • Migraine    • Nausea & vomiting    • PONV (postoperative nausea and vomiting)    • PTSD (post-traumatic stress disorder)    • Rectal bleeding        Allergies   Allergen Reactions   • Ativan [Lorazepam] Hallucinations     confusion   • Sulfa Antibiotics Shortness Of Breath and Swelling   • Compazine [Prochlorperazine Edisylate] Hives   • Demerol [Meperidine] Hives   • Droperidol Itching   • Prochlorperazine Hives   • Toradol [Ketorolac Tromethamine] Hives and Itching    • Zofran [Ondansetron Hcl] Rash       Past Surgical History:   Procedure Laterality Date   • ANAL SCOPE N/A 7/28/2016    Procedure: ANAL SCOPE;  Surgeon: Kael Lopez MD;  Location: [facility] OR;  Service:    • APPENDECTOMY     • COLONOSCOPY N/A 6/30/2016    Procedure: COLONOSCOPY  CPTCODE:53421;  Surgeon: Jose Antonio Belle III, MD;  Location: [facility] OR;  Service:    • COLONOSCOPY N/A 7/7/2016    Procedure: COLONOSCOPY (44614) CPT;  Surgeon: Jose Antonio Belle III, MD;  Location: [facility] OR;  Service:    • CYSTOSCOPY RETROGRADE PYELOGRAM Bilateral 4/28/2017    Procedure: CYSTOSCOPY RETROGRADE PYELOGRAM;  Surgeon: Mu Blunt MD;  Location: [facility] OR;  Service:    • ENDOSCOPY N/A 6/30/2016    Procedure: ESOPHAGOGASTRODUODENOSCOPY WITH BIOPSY  CPTCODE:63091;  Surgeon: Jose Antonio Belle III, MD;  Location: [facility] OR;  Service:    • HEMORRHOIDECTOMY N/A 7/28/2016    Procedure: HEMORRHOID STAPLING;  Surgeon: Kael Lopez MD;  Location: [facility] OR;  Service:    • HYSTERECTOMY     • KNEE SURGERY     • SHOULDER SURGERY      3 times       Family History   Problem Relation Age of Onset   • Crohn's disease Other    • Hypertension Other    • Diabetes Other    • Irritable bowel syndrome Other        Social History     Social History   • Marital status:      Spouse name: N/A   • Number of children: N/A   • Years of education: N/A     Social History Main Topics   • Smoking status: Former Smoker     Types: Cigarettes     Quit date: 11/1/2015   • Smokeless tobacco: Never Used   • Alcohol use No   • Drug use: Yes     Special: Marijuana   • Sexual activity: Defer     Other Topics Concern   • None     Social History Narrative           Objective   Physical Exam   Constitutional: She is oriented to person, place, and time. She appears well-developed and well-nourished.   HENT:   Head: Normocephalic.   Right Ear: External ear normal.   Left Ear: External ear normal.   Nose: Nose normal.   Mouth/Throat:  Oropharynx is clear and moist.   Eyes: Conjunctivae and EOM are normal. Pupils are equal, round, and reactive to light.   Neck: Normal range of motion. Neck supple. No tracheal deviation present. No thyromegaly present.   Cardiovascular: Normal rate, regular rhythm, normal heart sounds and intact distal pulses.    Pulmonary/Chest: Effort normal and breath sounds normal.   Abdominal: Soft. Bowel sounds are normal.   Musculoskeletal: She exhibits tenderness.        Left shoulder: She exhibits decreased range of motion, tenderness, pain and spasm.   Neurological: She is alert and oriented to person, place, and time. She has normal reflexes.   Skin: Skin is warm and dry.   Psychiatric: She has a normal mood and affect. Her behavior is normal. Judgment and thought content normal.   Nursing note and vitals reviewed.      Splint application  Date/Time: 7/26/2017 5:19 PM  Performed by: KELLY STUBBS  Authorized by: REGAN ARGUETA   Consent: Verbal consent not obtained. Written consent not obtained.  Consent given by: patient  Patient understanding: patient does not state understanding of the procedure being performed  Patient consent: the patient's understanding of the procedure does not match consent given  Procedure consent: procedure consent does not match procedure scheduled  Relevant documents: relevant documents not present or verified  Patient identity confirmed: verbally with patient  Location details: left arm  Splint type: sling                ED Course  ED Course   Comment By Time   42-year-old female comes in with left shoulder pain.  Patient states she fell times one day ago and felt her shoulder is located.  Patient states this is a chronic problem.  She does report that her  performed a joint reduction. She states that she has had continued pain and hurts to move her arm. Kelly Stubbs PA-C 07/26 1536                  [facility]  Number of Diagnoses or Management Options  Shoulder strain, left,  initial encounter: new and requires workup  Risk of Complications, Morbidity, and/or Mortality  Presenting problems: moderate  Diagnostic procedures: moderate  Management options: moderate    Patient Progress  Patient progress: stable      Final diagnoses:   Shoulder strain, left, initial encounter            Mark Stubbs PA-C  07/26/17 1354

## 2017-07-27 ENCOUNTER — APPOINTMENT (OUTPATIENT)
Dept: PREADMISSION TESTING | Facility: HOSPITAL | Age: 42
End: 2017-07-27

## 2017-07-28 ENCOUNTER — HOSPITAL ENCOUNTER (EMERGENCY)
Facility: HOSPITAL | Age: 42
Discharge: HOME | End: 2017-07-28
Attending: EMERGENCY MEDICINE | Admitting: EMERGENCY MEDICINE

## 2017-07-28 ENCOUNTER — APPOINTMENT (OUTPATIENT)
Dept: GENERAL RADIOLOGY | Facility: HOSPITAL | Age: 42
End: 2017-07-28

## 2017-07-28 ENCOUNTER — HOSPITAL ENCOUNTER (OUTPATIENT)
Facility: HOSPITAL | Age: 42
Setting detail: HOSPITAL OUTPATIENT SURGERY
Discharge: HOME | End: 2017-07-28
Attending: SURGERY | Admitting: SURGERY

## 2017-07-28 ENCOUNTER — ANESTHESIA EVENT (OUTPATIENT)
Dept: PERIOP | Facility: HOSPITAL | Age: 42
End: 2017-07-28

## 2017-07-28 ENCOUNTER — ANESTHESIA (OUTPATIENT)
Dept: PERIOP | Facility: HOSPITAL | Age: 42
End: 2017-07-28

## 2017-07-28 VITALS
HEART RATE: 75 BPM | SYSTOLIC BLOOD PRESSURE: 106 MMHG | OXYGEN SATURATION: 100 % | DIASTOLIC BLOOD PRESSURE: 74 MMHG | BODY MASS INDEX: 25.62 KG/M2 | HEIGHT: 69 IN | RESPIRATION RATE: 18 BRPM | WEIGHT: 173 LBS | TEMPERATURE: 97.9 F

## 2017-07-28 VITALS
WEIGHT: 173 LBS | OXYGEN SATURATION: 99 % | DIASTOLIC BLOOD PRESSURE: 84 MMHG | HEART RATE: 78 BPM | TEMPERATURE: 98 F | BODY MASS INDEX: 25.62 KG/M2 | HEIGHT: 69 IN | SYSTOLIC BLOOD PRESSURE: 104 MMHG | RESPIRATION RATE: 16 BRPM

## 2017-07-28 DIAGNOSIS — I87.8 POOR VENOUS ACCESS: ICD-10-CM

## 2017-07-28 DIAGNOSIS — G89.18 POST-OPERATIVE PAIN: Primary | ICD-10-CM

## 2017-07-28 PROCEDURE — 25010000002 FENTANYL CITRATE (PF) 100 MCG/2ML SOLUTION: Performed by: NURSE ANESTHETIST, CERTIFIED REGISTERED

## 2017-07-28 PROCEDURE — 36561 INSERT TUNNELED CV CATH: CPT | Performed by: SURGERY

## 2017-07-28 PROCEDURE — 99283 EMERGENCY DEPT VISIT LOW MDM: CPT

## 2017-07-28 PROCEDURE — 25010000002 MIDAZOLAM PER 1 MG: Performed by: ANESTHESIOLOGY

## 2017-07-28 PROCEDURE — 25010000002 PROPOFOL 10 MG/ML EMULSION: Performed by: NURSE ANESTHETIST, CERTIFIED REGISTERED

## 2017-07-28 PROCEDURE — 25010000002 FENTANYL CITRATE (PF) 100 MCG/2ML SOLUTION: Performed by: ANESTHESIOLOGY

## 2017-07-28 PROCEDURE — 71010 HC CHEST AP: CPT

## 2017-07-28 PROCEDURE — C1788 PORT, INDWELLING, IMP: HCPCS | Performed by: SURGERY

## 2017-07-28 PROCEDURE — 25010000003 CEFAZOLIN PER 500 MG: Performed by: SURGERY

## 2017-07-28 PROCEDURE — 71010 FL SURGERY FLUORO: CPT | Performed by: RADIOLOGY

## 2017-07-28 PROCEDURE — 71010 XR CHEST AP: CPT | Performed by: RADIOLOGY

## 2017-07-28 PROCEDURE — 25010000002 ORPHENADRINE CITRATE PER 60 MG: Performed by: PHYSICIAN ASSISTANT

## 2017-07-28 PROCEDURE — 77001 FLUOROGUIDE FOR VEIN DEVICE: CPT | Performed by: SURGERY

## 2017-07-28 PROCEDURE — 25010000002 MIDAZOLAM PER 1 MG: Performed by: NURSE ANESTHETIST, CERTIFIED REGISTERED

## 2017-07-28 PROCEDURE — 25010000002 PROPOFOL 1000 MG/ML EMULSION: Performed by: NURSE ANESTHETIST, CERTIFIED REGISTERED

## 2017-07-28 PROCEDURE — 76000 FLUOROSCOPY <1 HR PHYS/QHP: CPT

## 2017-07-28 PROCEDURE — 25010000002 HEPARIN FLUSH (PORCINE) 100 UNIT/ML SOLUTION: Performed by: SURGERY

## 2017-07-28 PROCEDURE — 76937 US GUIDE VASCULAR ACCESS: CPT | Performed by: SURGERY

## 2017-07-28 PROCEDURE — 96372 THER/PROPH/DIAG INJ SC/IM: CPT

## 2017-07-28 PROCEDURE — 25010000002 PROMETHAZINE PER 50 MG: Performed by: ANESTHESIOLOGY

## 2017-07-28 DEVICE — VACCESS CT POWER-INJECTABLE IMPLANTABLE PORT (WITH SUTURE PLUGS) (8F)
Type: IMPLANTABLE DEVICE | Status: FUNCTIONAL
Brand: VACCESS

## 2017-07-28 RX ORDER — PROMETHAZINE HYDROCHLORIDE 25 MG/ML
12.5 INJECTION, SOLUTION INTRAMUSCULAR; INTRAVENOUS ONCE
Status: COMPLETED | OUTPATIENT
Start: 2017-07-28 | End: 2017-07-28

## 2017-07-28 RX ORDER — PROPOFOL 10 MG/ML
VIAL (ML) INTRAVENOUS AS NEEDED
Status: DISCONTINUED | OUTPATIENT
Start: 2017-07-28 | End: 2017-07-28 | Stop reason: SURG

## 2017-07-28 RX ORDER — FENTANYL CITRATE 50 UG/ML
100 INJECTION, SOLUTION INTRAMUSCULAR; INTRAVENOUS
Status: DISCONTINUED | OUTPATIENT
Start: 2017-07-28 | End: 2017-07-28 | Stop reason: HOSPADM

## 2017-07-28 RX ORDER — LIDOCAINE HYDROCHLORIDE 10 MG/ML
INJECTION, SOLUTION EPIDURAL; INFILTRATION; INTRACAUDAL; PERINEURAL AS NEEDED
Status: DISCONTINUED | OUTPATIENT
Start: 2017-07-28 | End: 2017-07-28 | Stop reason: HOSPADM

## 2017-07-28 RX ORDER — MAGNESIUM HYDROXIDE 1200 MG/15ML
LIQUID ORAL AS NEEDED
Status: DISCONTINUED | OUTPATIENT
Start: 2017-07-28 | End: 2017-07-28 | Stop reason: HOSPADM

## 2017-07-28 RX ORDER — FENTANYL CITRATE 50 UG/ML
50 INJECTION, SOLUTION INTRAMUSCULAR; INTRAVENOUS
Status: DISCONTINUED | OUTPATIENT
Start: 2017-07-28 | End: 2017-07-28 | Stop reason: HOSPADM

## 2017-07-28 RX ORDER — MIDAZOLAM HYDROCHLORIDE 1 MG/ML
INJECTION INTRAMUSCULAR; INTRAVENOUS AS NEEDED
Status: DISCONTINUED | OUTPATIENT
Start: 2017-07-28 | End: 2017-07-28 | Stop reason: SURG

## 2017-07-28 RX ORDER — OXYCODONE HYDROCHLORIDE AND ACETAMINOPHEN 5; 325 MG/1; MG/1
1 TABLET ORAL ONCE AS NEEDED
Status: DISCONTINUED | OUTPATIENT
Start: 2017-07-28 | End: 2017-07-28 | Stop reason: HOSPADM

## 2017-07-28 RX ORDER — SODIUM CHLORIDE 0.9 % (FLUSH) 0.9 %
1-10 SYRINGE (ML) INJECTION AS NEEDED
Status: DISCONTINUED | OUTPATIENT
Start: 2017-07-28 | End: 2017-07-28 | Stop reason: HOSPADM

## 2017-07-28 RX ORDER — IPRATROPIUM BROMIDE AND ALBUTEROL SULFATE 2.5; .5 MG/3ML; MG/3ML
3 SOLUTION RESPIRATORY (INHALATION) ONCE AS NEEDED
Status: DISCONTINUED | OUTPATIENT
Start: 2017-07-28 | End: 2017-07-28 | Stop reason: HOSPADM

## 2017-07-28 RX ORDER — ORPHENADRINE CITRATE 30 MG/ML
60 INJECTION INTRAMUSCULAR; INTRAVENOUS ONCE
Status: COMPLETED | OUTPATIENT
Start: 2017-07-28 | End: 2017-07-28

## 2017-07-28 RX ORDER — SODIUM CHLORIDE, SODIUM LACTATE, POTASSIUM CHLORIDE, CALCIUM CHLORIDE 600; 310; 30; 20 MG/100ML; MG/100ML; MG/100ML; MG/100ML
125 INJECTION, SOLUTION INTRAVENOUS CONTINUOUS
Status: DISCONTINUED | OUTPATIENT
Start: 2017-07-28 | End: 2017-07-28 | Stop reason: HOSPADM

## 2017-07-28 RX ORDER — MIDAZOLAM HYDROCHLORIDE 1 MG/ML
1 INJECTION INTRAMUSCULAR; INTRAVENOUS
Status: DISCONTINUED | OUTPATIENT
Start: 2017-07-28 | End: 2017-07-28 | Stop reason: HOSPADM

## 2017-07-28 RX ORDER — LIDOCAINE HYDROCHLORIDE 20 MG/ML
INJECTION, SOLUTION INFILTRATION; PERINEURAL AS NEEDED
Status: DISCONTINUED | OUTPATIENT
Start: 2017-07-28 | End: 2017-07-28 | Stop reason: SURG

## 2017-07-28 RX ORDER — MIDAZOLAM HYDROCHLORIDE 1 MG/ML
2 INJECTION INTRAMUSCULAR; INTRAVENOUS
Status: DISCONTINUED | OUTPATIENT
Start: 2017-07-28 | End: 2017-07-28 | Stop reason: HOSPADM

## 2017-07-28 RX ORDER — FENTANYL CITRATE 50 UG/ML
INJECTION, SOLUTION INTRAMUSCULAR; INTRAVENOUS AS NEEDED
Status: DISCONTINUED | OUTPATIENT
Start: 2017-07-28 | End: 2017-07-28 | Stop reason: SURG

## 2017-07-28 RX ADMIN — PROMETHAZINE HYDROCHLORIDE 12.5 MG: 25 INJECTION INTRAMUSCULAR; INTRAVENOUS at 08:21

## 2017-07-28 RX ADMIN — MIDAZOLAM HYDROCHLORIDE 2 MG: 1 INJECTION, SOLUTION INTRAMUSCULAR; INTRAVENOUS at 08:32

## 2017-07-28 RX ADMIN — FENTANYL CITRATE 100 MCG: 50 INJECTION INTRAMUSCULAR; INTRAVENOUS at 08:59

## 2017-07-28 RX ADMIN — PROPOFOL 100 MCG/KG/MIN: 10 INJECTION, EMULSION INTRAVENOUS at 09:57

## 2017-07-28 RX ADMIN — SODIUM CHLORIDE, POTASSIUM CHLORIDE, SODIUM LACTATE AND CALCIUM CHLORIDE 125 ML/HR: 600; 310; 30; 20 INJECTION, SOLUTION INTRAVENOUS at 08:21

## 2017-07-28 RX ADMIN — ORPHENADRINE CITRATE 60 MG: 30 INJECTION INTRAMUSCULAR; INTRAVENOUS at 19:35

## 2017-07-28 RX ADMIN — CEFAZOLIN SODIUM 2 G: 2 SOLUTION INTRAVENOUS at 09:50

## 2017-07-28 RX ADMIN — LIDOCAINE HYDROCHLORIDE 80 MG: 20 INJECTION, SOLUTION INFILTRATION; PERINEURAL at 09:57

## 2017-07-28 RX ADMIN — MIDAZOLAM HYDROCHLORIDE 2 MG: 1 INJECTION, SOLUTION INTRAMUSCULAR; INTRAVENOUS at 09:57

## 2017-07-28 RX ADMIN — FENTANYL CITRATE 100 MCG: 50 INJECTION INTRAMUSCULAR; INTRAVENOUS at 09:57

## 2017-07-28 RX ADMIN — PROMETHAZINE HYDROCHLORIDE 12.5 MG: 25 INJECTION INTRAMUSCULAR; INTRAVENOUS at 11:07

## 2017-07-28 RX ADMIN — FENTANYL CITRATE 50 MCG: 50 INJECTION INTRAMUSCULAR; INTRAVENOUS at 11:07

## 2017-07-28 RX ADMIN — PROPOFOL 50 MG: 10 INJECTION, EMULSION INTRAVENOUS at 09:57

## 2017-07-28 NOTE — ANESTHESIA POSTPROCEDURE EVALUATION
Patient: Susanna Long    Procedure Summary     Date Anesthesia Start Anesthesia Stop Room / Location    07/28/17 0951 1026 [facility] / [facility]       Procedure Diagnosis Surgeon Provider    INSERTION OF PORTACATH (N/A Chest) Poor venous access  (Poor venous access [I87.8]) MD Marty Gilbert MD          Anesthesia Type: general  Last vitals  BP   100/68 (07/28/17 1039)    Temp   97 °F (36.1 °C) (07/28/17 1024)    Pulse   74 (07/28/17 1039)   Resp   16 (07/28/17 1039)    SpO2   100 % (07/28/17 1039)      Post Anesthesia Care and Evaluation    Patient location during evaluation: PHASE II  Patient participation: complete - patient participated  Level of consciousness: awake and alert  Pain score: 1  Pain management: adequate  Airway patency: patent  Anesthetic complications: No anesthetic complications  PONV Status: controlled  Cardiovascular status: acceptable  Respiratory status: acceptable  Hydration status: acceptable

## 2017-07-28 NOTE — ANESTHESIA PREPROCEDURE EVALUATION
Anesthesia Evaluation     Patient summary reviewed and Nursing notes reviewed   history of anesthetic complications: PONV         Airway   Mallampati: II  TM distance: >3 FB  Neck ROM: full  Dental    (+) poor dentition    Pulmonary - negative pulmonary ROS and normal exam   Cardiovascular - negative cardio ROS and normal exam  Exercise tolerance: good (4-7 METS)    NYHA Classification: I        Neuro/Psych  (+) headaches, psychiatric history Anxiety,    GI/Hepatic/Renal/Endo - negative ROS     Musculoskeletal     (+) myalgias,   Abdominal  - normal exam    Bowel sounds: normal.   Substance History - negative use     OB/GYN negative ob/gyn ROS         Other   (+) arthritis                                     Anesthesia Plan    ASA 2     general     intravenous induction   Anesthetic plan and risks discussed with patient.

## 2017-08-11 ENCOUNTER — OFFICE VISIT (OUTPATIENT)
Dept: PULMONOLOGY | Facility: CLINIC | Age: 42
End: 2017-08-11

## 2017-08-11 ENCOUNTER — HOSPITAL ENCOUNTER (EMERGENCY)
Facility: HOSPITAL | Age: 42
Discharge: HOME | End: 2017-08-11
Attending: EMERGENCY MEDICINE | Admitting: EMERGENCY MEDICINE

## 2017-08-11 ENCOUNTER — APPOINTMENT (OUTPATIENT)
Dept: CT IMAGING | Facility: HOSPITAL | Age: 42
End: 2017-08-11

## 2017-08-11 VITALS
OXYGEN SATURATION: 98 % | HEART RATE: 70 BPM | HEIGHT: 69 IN | RESPIRATION RATE: 19 BRPM | DIASTOLIC BLOOD PRESSURE: 82 MMHG | TEMPERATURE: 98.2 F | BODY MASS INDEX: 24.88 KG/M2 | WEIGHT: 168 LBS | SYSTOLIC BLOOD PRESSURE: 117 MMHG

## 2017-08-11 VITALS
DIASTOLIC BLOOD PRESSURE: 80 MMHG | TEMPERATURE: 98.2 F | OXYGEN SATURATION: 98 % | HEIGHT: 69 IN | WEIGHT: 168 LBS | HEART RATE: 87 BPM | SYSTOLIC BLOOD PRESSURE: 96 MMHG | BODY MASS INDEX: 24.88 KG/M2

## 2017-08-11 DIAGNOSIS — J41.1 MUCOPURULENT CHRONIC BRONCHITIS (HCC): Primary | ICD-10-CM

## 2017-08-11 DIAGNOSIS — Z87.09 HISTORY OF PNEUMOTHORAX: ICD-10-CM

## 2017-08-11 DIAGNOSIS — G43.709 CHRONIC MIGRAINE W/O AURA W/O STATUS MIGRAINOSUS, NOT INTRACTABLE: Primary | ICD-10-CM

## 2017-08-11 DIAGNOSIS — J84.9 INTERSTITIAL LUNG DISEASE (HCC): ICD-10-CM

## 2017-08-11 LAB
ALBUMIN SERPL-MCNC: 4.1 G/DL (ref 3.5–5)
ALBUMIN/GLOB SERPL: 1.5 G/DL (ref 1.5–2.5)
ALP SERPL-CCNC: 61 U/L (ref 35–104)
ALT SERPL W P-5'-P-CCNC: 50 U/L (ref 10–36)
ANION GAP SERPL CALCULATED.3IONS-SCNC: 4.2 MMOL/L (ref 3.6–11.2)
AST SERPL-CCNC: 57 U/L (ref 10–30)
BACTERIA UR QL AUTO: ABNORMAL /HPF
BASOPHILS # BLD AUTO: 0 10*3/MM3 (ref 0–0.3)
BASOPHILS NFR BLD AUTO: 0 % (ref 0–2)
BILIRUB SERPL-MCNC: 0.5 MG/DL (ref 0.2–1.8)
BILIRUB UR QL STRIP: NEGATIVE
BUN BLD-MCNC: 10 MG/DL (ref 7–21)
BUN/CREAT SERPL: 14.9 (ref 7–25)
CALCIUM SPEC-SCNC: 9.4 MG/DL (ref 7.7–10)
CHLORIDE SERPL-SCNC: 105 MMOL/L (ref 99–112)
CLARITY UR: ABNORMAL
CO2 SERPL-SCNC: 30.8 MMOL/L (ref 24.3–31.9)
COLOR UR: YELLOW
CREAT BLD-MCNC: 0.67 MG/DL (ref 0.43–1.29)
DEPRECATED RDW RBC AUTO: 41.4 FL (ref 37–54)
EOSINOPHIL # BLD AUTO: 0.16 10*3/MM3 (ref 0–0.7)
EOSINOPHIL NFR BLD AUTO: 2.6 % (ref 0–5)
ERYTHROCYTE [DISTWIDTH] IN BLOOD BY AUTOMATED COUNT: 12.7 % (ref 11.5–14.5)
GFR SERPL CREATININE-BSD FRML MDRD: 97 ML/MIN/1.73
GLOBULIN UR ELPH-MCNC: 2.8 GM/DL
GLUCOSE BLD-MCNC: 88 MG/DL (ref 70–110)
GLUCOSE UR STRIP-MCNC: NEGATIVE MG/DL
HCT VFR BLD AUTO: 39.7 % (ref 37–47)
HGB BLD-MCNC: 13.5 G/DL (ref 12–16)
HGB UR QL STRIP.AUTO: NEGATIVE
HYALINE CASTS UR QL AUTO: ABNORMAL /LPF
IMM GRANULOCYTES # BLD: 0.01 10*3/MM3 (ref 0–0.03)
IMM GRANULOCYTES NFR BLD: 0.2 % (ref 0–0.5)
KETONES UR QL STRIP: NEGATIVE
LEUKOCYTE ESTERASE UR QL STRIP.AUTO: ABNORMAL
LYMPHOCYTES # BLD AUTO: 2.68 10*3/MM3 (ref 1–3)
LYMPHOCYTES NFR BLD AUTO: 43.5 % (ref 21–51)
MCH RBC QN AUTO: 31.3 PG (ref 27–33)
MCHC RBC AUTO-ENTMCNC: 34 G/DL (ref 33–37)
MCV RBC AUTO: 91.9 FL (ref 80–94)
MONOCYTES # BLD AUTO: 0.48 10*3/MM3 (ref 0.1–0.9)
MONOCYTES NFR BLD AUTO: 7.8 % (ref 0–10)
NEUTROPHILS # BLD AUTO: 2.83 10*3/MM3 (ref 1.4–6.5)
NEUTROPHILS NFR BLD AUTO: 45.9 % (ref 30–70)
NITRITE UR QL STRIP: NEGATIVE
OSMOLALITY SERPL CALC.SUM OF ELEC: 277.9 MOSM/KG (ref 273–305)
PH UR STRIP.AUTO: 8.5 [PH] (ref 5–8)
PLATELET # BLD AUTO: 122 10*3/MM3 (ref 130–400)
PMV BLD AUTO: 10.7 FL (ref 6–10)
POTASSIUM BLD-SCNC: 3.8 MMOL/L (ref 3.5–5.3)
PROT SERPL-MCNC: 6.9 G/DL (ref 6–8)
PROT UR QL STRIP: NEGATIVE
RBC # BLD AUTO: 4.32 10*6/MM3 (ref 4.2–5.4)
RBC # UR: ABNORMAL /HPF
REF LAB TEST METHOD: ABNORMAL
SODIUM BLD-SCNC: 140 MMOL/L (ref 135–153)
SP GR UR STRIP: 1.02 (ref 1–1.03)
SQUAMOUS #/AREA URNS HPF: ABNORMAL /HPF
UROBILINOGEN UR QL STRIP: ABNORMAL
WBC NRBC COR # BLD: 6.16 10*3/MM3 (ref 4.5–12.5)
WBC UR QL AUTO: ABNORMAL /HPF

## 2017-08-11 PROCEDURE — 99284 EMERGENCY DEPT VISIT MOD MDM: CPT

## 2017-08-11 PROCEDURE — 87086 URINE CULTURE/COLONY COUNT: CPT | Performed by: PHYSICIAN ASSISTANT

## 2017-08-11 PROCEDURE — 96365 THER/PROPH/DIAG IV INF INIT: CPT

## 2017-08-11 PROCEDURE — 87077 CULTURE AEROBIC IDENTIFY: CPT | Performed by: PHYSICIAN ASSISTANT

## 2017-08-11 PROCEDURE — 85025 COMPLETE CBC W/AUTO DIFF WBC: CPT | Performed by: PHYSICIAN ASSISTANT

## 2017-08-11 PROCEDURE — 99204 OFFICE O/P NEW MOD 45 MIN: CPT | Performed by: INTERNAL MEDICINE

## 2017-08-11 PROCEDURE — 81001 URINALYSIS AUTO W/SCOPE: CPT | Performed by: PHYSICIAN ASSISTANT

## 2017-08-11 PROCEDURE — 25010000002 BUTORPHANOL PER 1 MG: Performed by: PHYSICIAN ASSISTANT

## 2017-08-11 PROCEDURE — 70450 CT HEAD/BRAIN W/O DYE: CPT

## 2017-08-11 PROCEDURE — 96375 TX/PRO/DX INJ NEW DRUG ADDON: CPT

## 2017-08-11 PROCEDURE — 80053 COMPREHEN METABOLIC PANEL: CPT | Performed by: PHYSICIAN ASSISTANT

## 2017-08-11 PROCEDURE — 87186 SC STD MICRODIL/AGAR DIL: CPT | Performed by: PHYSICIAN ASSISTANT

## 2017-08-11 PROCEDURE — 70450 CT HEAD/BRAIN W/O DYE: CPT | Performed by: RADIOLOGY

## 2017-08-11 PROCEDURE — 87147 CULTURE TYPE IMMUNOLOGIC: CPT | Performed by: PHYSICIAN ASSISTANT

## 2017-08-11 PROCEDURE — 25010000002 PROMETHAZINE PER 50 MG: Performed by: PHYSICIAN ASSISTANT

## 2017-08-11 RX ORDER — PROMETHAZINE HYDROCHLORIDE 25 MG/ML
25 INJECTION, SOLUTION INTRAMUSCULAR; INTRAVENOUS ONCE
Status: DISCONTINUED | OUTPATIENT
Start: 2017-08-11 | End: 2017-08-11

## 2017-08-11 RX ADMIN — BUTORPHANOL TARTRATE 2 MG: 2 INJECTION, SOLUTION INTRAMUSCULAR; INTRAVENOUS at 22:13

## 2017-08-11 RX ADMIN — PROMETHAZINE HYDROCHLORIDE 12.5 MG: 25 INJECTION INTRAMUSCULAR; INTRAVENOUS at 22:04

## 2017-08-11 NOTE — PROGRESS NOTES
Hui Camilo is a 42 y.o. female who is being seen for Emphysema    History of Present Illness   This 42-year-old female has been referred to us by her primary care provider for evaluation of ongoing shortness of breath.  Patient tells me that she used to smoke but did quit smoking a few years ago.  She always has exertional dyspnea but lately for the past year or so noticing progressive downhill course.  Now she has difficulty climbing stairs or walking uphill, even at level she cannot walk more than a few blocks.  She has cough which is productive of whitish phlegm mostly in the morning time.  At night she has more cough but that is nonproductive.  She remains very fatigued throughout the day.    On further questioning patient tells us that she has some significant past medical history including inflammatory bowel disease, chronic migraine, meningioma of brain as well as history of spontaneous pneumothorax ×2 in the past.  Past Medical History:   Diagnosis Date   • Abdominal pain    • Abdominal swelling    • Hoyos's disease    • Bipolar 1 disorder    • Brain tumor     R Frontal Lobe per pt   • Brain tumor 2014   • Brain tumor (benign)    • Constipation    • DDD (degenerative disc disease), cervical 05/29/2017   • DDD (degenerative disc disease), cervical    • Diarrhea    • Fibromyalgia    • IBS (irritable bowel syndrome)    • Migraine    • Nausea & vomiting    • PONV (postoperative nausea and vomiting)    • PTSD (post-traumatic stress disorder)    • Rectal bleeding      Past Surgical History:   Procedure Laterality Date   • ANAL SCOPE N/A 7/28/2016    Procedure: ANAL SCOPE;  Surgeon: Kael Lopez MD;  Location: [facility] OR;  Service:    • APPENDECTOMY     • COLONOSCOPY N/A 6/30/2016    Procedure: COLONOSCOPY  CPTCODE:47894;  Surgeon: Jose Antonio Belle III, MD;  Location: [facility] OR;  Service:    • COLONOSCOPY N/A 7/7/2016    Procedure: COLONOSCOPY (22322) CPT;  Surgeon: Jose Antonio Belle III, MD;   Location: [facility] OR;  Service:    • CYSTOSCOPY RETROGRADE PYELOGRAM Bilateral 4/28/2017    Procedure: CYSTOSCOPY RETROGRADE PYELOGRAM;  Surgeon: Mu Blnut MD;  Location: [facility] OR;  Service:    • ENDOSCOPY N/A 6/30/2016    Procedure: ESOPHAGOGASTRODUODENOSCOPY WITH BIOPSY  CPTCODE:75032;  Surgeon: Jose Antonio Belle III, MD;  Location: [facility] OR;  Service:    • HEMORRHOIDECTOMY N/A 7/28/2016    Procedure: HEMORRHOID STAPLING;  Surgeon: Kael Lopez MD;  Location: [facility] OR;  Service:    • HYSTERECTOMY     • KNEE SURGERY     • LAPAROSCOPIC SALPINGOOPHERECTOMY     • PORTACATH PLACEMENT N/A 7/28/2017    Procedure: INSERTION OF PORTACATH;  Surgeon: Celso Arredondo MD;  Location: [facility] OR;  Service:    • SHOULDER SURGERY      3 times     Family History   Problem Relation Age of Onset   • Crohn's disease Other    • Hypertension Other    • Diabetes Other    • Irritable bowel syndrome Other       reports that she quit smoking about 21 months ago. She has a 20.00 pack-year smoking history. She has never used smokeless tobacco. She reports that she uses illicit drugs, including Marijuana. She reports that she does not drink alcohol.  Allergies   Allergen Reactions   • Ativan [Lorazepam] Hallucinations     confusion   • Sulfa Antibiotics Shortness Of Breath and Swelling   • Compazine [Prochlorperazine Edisylate] Hives   • Demerol [Meperidine] Hives   • Droperidol Itching   • Toradol [Ketorolac Tromethamine] Hives and Itching   • Zofran [Ondansetron Hcl] Rash           The following portions of the patient's history were reviewed and updated as appropriate: allergies, current medications, past family history, past medical history, past social history, past surgical history and problem list.    Review of Systems   Constitutional: Negative for appetite change, chills, diaphoresis and unexpected weight change.   HENT: Negative for sore throat, trouble swallowing and voice change.    Eyes: Negative for visual  "disturbance.   Respiratory: Positive for cough (Productive) and shortness of breath. Negative for apnea, choking and wheezing.    Cardiovascular: Negative for chest pain, palpitations and leg swelling.   Gastrointestinal: Negative for abdominal pain, constipation, diarrhea, nausea and vomiting.   Endocrine: Negative for cold intolerance, heat intolerance, polydipsia, polyphagia and polyuria.   Genitourinary: Negative for difficulty urinating and dysuria.   Musculoskeletal: Negative for gait problem.   Skin: Negative for rash and wound.   Neurological: Negative for syncope and light-headedness.   Hematological: Negative for adenopathy.   Psychiatric/Behavioral: Negative for agitation, behavioral problems and confusion.   All other systems reviewed and are negative.      Objective   BP 96/80 (BP Location: Left arm, Patient Position: Sitting, Cuff Size: Adult)  Pulse 87  Temp 98.2 °F (36.8 °C) (Oral)   Ht 69\" (175.3 cm)  Wt 168 lb (76.2 kg)  LMP  (LMP Unknown)  SpO2 98%  BMI 24.81 kg/m2  Physical Exam   Constitutional: She is oriented to person, place, and time.   HENT:   Head: Normocephalic and atraumatic.   Nose: Mucosal edema present.   Eyes: EOM are normal. Pupils are equal, round, and reactive to light.   Neck: Neck supple.   Cardiovascular: Normal rate, regular rhythm and normal heart sounds.    Pulmonary/Chest: She has rhonchi.   Vesicular breath sound bilaterally with prolonged expiratory phase   Abdominal: Soft. Bowel sounds are normal.   Musculoskeletal: Normal range of motion. She exhibits no deformity.   Neurological: She is alert and oriented to person, place, and time.   Skin: Skin is warm and dry.   Psychiatric: She has a normal mood and affect. Her behavior is normal.   Nursing note and vitals reviewed.        Radiology:  Xr Shoulder 2+ View Left    Result Date: 7/27/2017  Healed changes of distal clavicle fracture. No acute osseous or articular abnormality on today's exam.  This report was " finalized on 7/27/2017 6:16 AM by Dr. Ankit Naik MD.      Xr Shoulder 2+ View Left    Result Date: 4/21/2017  DISTAL CLAVICLE FRACTURE.  COMMUNICATION: Per this written report.  This report was finalized on 4/21/2017 3:02 PM by Dr. Rigoberto Huddleston MD.      Xr Ankle 3+ View Left    Result Date: 4/21/2017  No acute or destructive bony abnormality.  COMMUNICATION: Per this written report.  This report was finalized on 4/21/2017 3:02 PM by Dr. Rigoberto Huddleston MD.      Ct Head Without Contrast    Result Date: 5/29/2017  No acute intracranial abnormality.  This report was finalized on 5/29/2017 9:09 AM by Dr. Rigoberto Huddleston MD.      Ct Head Without Contrast    Result Date: 4/21/2017  No acute intracranial abnormality.  This report was finalized on 4/21/2017 3:03 PM by Dr. Rigoberto Huddleston MD.      Ct Cervical Spine Without Contrast    Result Date: 4/24/2017  There is arthritic change and there is straightening of the cervical spine, but no acute fracture is seen  This report was finalized on 4/24/2017 2:29 PM by Dr. Luis Fernando Macdonald MD.      Xr Sinus Carlson View Only    Result Date: 7/13/2017  Sinuses appear to be aerated.  This report was finalized on 7/13/2017 8:00 AM by Dr. Rigoberto Huddleston MD.      Xr Chest 1 View    Result Date: 7/19/2017  No evidence of active or acute cardiopulmonary disease on today's chest radiograph.     This report was finalized on 7/19/2017 8:06 AM by Dr. Luis Fernando Macdonald MD.      Xr Chest 1 View    Result Date: 7/13/2017  No radiographic evidence of acute cardiac or pulmonary disease.  This report was finalized on 7/13/2017 8:00 AM by Dr. Rigoberto Huddleston MD.      Xr Chest 1 View    Result Date: 5/29/2017  No radiographic evidence of acute cardiac or pulmonary disease.  This report was finalized on 5/29/2017 9:09 AM by Dr. Rigoberto Huddleston MD.      Ct Abdomen Pelvis Stone Protocol    Result Date: 6/23/2017  Impression: 1. Unremarkable exam.  No source for the patient symptoms. 2. Other incidental/non-acute  findings as above.  This report was finalized on 6/23/2017 6:47 AM by Dr. Rigoberto Huddleston MD.      Ct Facial Bones Without Contrast    Result Date: 4/21/2017  No acute facial fractures.  This report was finalized on 4/21/2017 2:56 PM by Dr. Rigoberto Huddleston MD.      Xr Chest Ap    Result Date: 7/28/2017  PowerPort tip in the superior vena cava     This report was finalized on 7/28/2017 10:48 AM by Dr. Luis Fernando Macdonald MD.        Lab Results:  Admission on 07/18/2017, Discharged on 07/19/2017   Component Date Value Ref Range Status   • Glucose 07/18/2017 99  70 - 110 mg/dL Final   • BUN 07/18/2017 6* 7 - 21 mg/dL Final   • Creatinine 07/18/2017 0.75  0.43 - 1.29 mg/dL Final   • Sodium 07/18/2017 142  135 - 153 mmol/L Final   • Potassium 07/18/2017 3.9  3.5 - 5.3 mmol/L Final   • Chloride 07/18/2017 105  99 - 112 mmol/L Final   • CO2 07/18/2017 32.7* 24.3 - 31.9 mmol/L Final   • Calcium 07/18/2017 9.7  7.7 - 10.0 mg/dL Final   • Total Protein 07/18/2017 7.2  6.0 - 8.0 g/dL Final   • Albumin 07/18/2017 4.20  3.50 - 5.00 g/dL Final   • ALT (SGPT) 07/18/2017 52* 10 - 36 U/L Final   • AST (SGOT) 07/18/2017 49* 10 - 30 U/L Final   • Alkaline Phosphatase 07/18/2017 71  35 - 104 U/L Final   • Total Bilirubin 07/18/2017 0.4  0.2 - 1.8 mg/dL Final   • eGFR Non African Amer 07/18/2017 85  >60 mL/min/1.73 Final   • Globulin 07/18/2017 3.0  gm/dL Final   • A/G Ratio 07/18/2017 1.4* 1.5 - 2.5 g/dL Final   • BUN/Creatinine Ratio 07/18/2017 8.0  7.0 - 25.0 Final   • Anion Gap 07/18/2017 4.3  3.6 - 11.2 mmol/L Final   • Troponin I 07/18/2017 <0.006  <=0.040 ng/mL Final   • Creatine Kinase 07/18/2017 31  24 - 173 U/L Final   • CKMB 07/18/2017 <0.18  0.00 - 5.00 ng/mL Final   • Extra Tube 07/18/2017 hold for add-on   Final   • Extra Tube 07/18/2017 Hold for add-ons.   Final   • Extra Tube 07/18/2017 hold for add-on   Final   • Extra Tube 07/18/2017 Hold for add-ons.   Final   • WBC 07/18/2017 5.34  4.50 - 12.50 10*3/mm3 Final   • RBC  07/18/2017 4.63  4.20 - 5.40 10*6/mm3 Final   • Hemoglobin 07/18/2017 14.3  12.0 - 16.0 g/dL Final   • Hematocrit 07/18/2017 43.8  37.0 - 47.0 % Final   • MCV 07/18/2017 94.6* 80.0 - 94.0 fL Final   • MCH 07/18/2017 30.9  27.0 - 33.0 pg Final   • MCHC 07/18/2017 32.6* 33.0 - 37.0 g/dL Final   • RDW 07/18/2017 13.1  11.5 - 14.5 % Final   • RDW-SD 07/18/2017 43.9  37.0 - 54.0 fl Final   • MPV 07/18/2017 11.0* 6.0 - 10.0 fL Final   • Platelets 07/18/2017 114* 130 - 400 10*3/mm3 Final   • Neutrophil % 07/18/2017 38.7  30.0 - 70.0 % Final   • Lymphocyte % 07/18/2017 48.7  21.0 - 51.0 % Final   • Monocyte % 07/18/2017 9.4  0.0 - 10.0 % Final   • Eosinophil % 07/18/2017 2.8  0.0 - 5.0 % Final   • Basophil % 07/18/2017 0.2  0.0 - 2.0 % Final   • Immature Grans % 07/18/2017 0.2  0.0 - 0.5 % Final   • Neutrophils, Absolute 07/18/2017 2.07  1.40 - 6.50 10*3/mm3 Final   • Lymphocytes, Absolute 07/18/2017 2.60  1.00 - 3.00 10*3/mm3 Final   • Monocytes, Absolute 07/18/2017 0.50  0.10 - 0.90 10*3/mm3 Final   • Eosinophils, Absolute 07/18/2017 0.15  0.00 - 0.70 10*3/mm3 Final   • Basophils, Absolute 07/18/2017 0.01  0.00 - 0.30 10*3/mm3 Final   • Immature Grans, Absolute 07/18/2017 0.01  0.00 - 0.03 10*3/mm3 Final   • Osmolality Calc 07/18/2017 280.8  273.0 - 305.0 mOsm/kg Final   • CK-MB Index 07/18/2017   0.0 - 3.0 % Final       Assessment      ICD-10-CM ICD-9-CM   1. Mucopurulent chronic bronchitis J41.1 491.1   2. Interstitial lung disease J84.9 515   3. History of pneumothorax Z87.09 V12.69                DISCUSSION:  Patient presents with a rather complex past medical history.  She is an ex-smoker with significant smoking history and part of her shortness of breath is possibly due to underlying obstructive airways disease.  But with her history of inflammatory bowel disease one may wonder if she has any associated connective tissue disorder.  I have reviewed her chest x-ray and noted some subtle fibrotic changes.  I like to  do a CT scan of the chest with high resolution and thin cut to evaluate that.    Patient had history of spontaneous pneumothorax, she is a smoker we will check her alpha 1 antitrypsin level and also would do a pulmonary function test for objective assessment of her airways function.  I would see her again after that.    Plan    Orders Placed This Encounter   Procedures   • CT Chest Without Contrast   • Pulmonary Function Test     No orders of the defined types were placed in this encounter.                 Brett Hastings MD, FCCP, FAASM  Pulmonary, Critical Care, and Sleep Medicine

## 2017-08-12 NOTE — ED PROVIDER NOTES
Subjective   HPI Comments: This is a 42-year-old female patient who presents to the ER with chief complaint of a headache.  Patient does have a history of migraines for many years duration.  She's done treatments to include Botox, Imitrex, Maxalt as well as narcotic pain medication.  She does also have a benign brain tumor that is being followed.  Unfortunately this morning at about 1:30 AM the patient woke up with severe pain in her right temporal lobe.  The pain is 10 out of 10 at this time and has been since its onset.  It is sharp, shooting, and throbbing.  This is the worst headache she's ever had.  The pain does not radiate.  She does have blurriness in her bilateral eyes but the right is worse the left and becomes very cloudy at times.  This is different than her previous migraines as well.  She has had increased stress recently which she attributed it to.  She doesn't have any numbness or tingling, facial droop, slurred speech.  She has tried Norco 7.5, Tylenol, Imitrex, Maxalt without relief of her symptoms.  Pain is worsened by sound, light, and activity.    Patient is a 42 y.o. female presenting with headaches.   Headache   Pain location:  R temporal  Quality:  Sharp and stabbing  Radiates to:  Does not radiate  Severity currently:  10/10  Severity at highest:  10/10  Onset quality:  Sudden  Duration:  1 day  Timing:  Constant  Progression:  Worsening  Chronicity:  Recurrent  Similar to prior headaches: no    Context: activity, bright light and loud noise    Context: not caffeine, not coughing and not eating    Relieved by:  Nothing  Worsened by:  Activity, light, neck movement and sound  Ineffective treatments:  Acetaminophen (Norco 7.5, imitrex, maxalt)  Associated symptoms: blurred vision and visual change    Associated symptoms: no abdominal pain, no back pain, no congestion, no cough, no diarrhea, no dizziness, no eye pain, no facial pain, no fever, no focal weakness, no nausea, no numbness, no  seizures, no syncope, no tingling, no URI, no vomiting and no weakness        Review of Systems   Constitutional: Negative.  Negative for fever.   HENT: Negative for congestion.    Eyes: Positive for blurred vision. Negative for pain.   Respiratory: Negative.  Negative for cough.    Cardiovascular: Negative.  Negative for chest pain and syncope.   Gastrointestinal: Negative.  Negative for abdominal pain, diarrhea, nausea and vomiting.   Endocrine: Negative.    Genitourinary: Negative.  Negative for dysuria.   Musculoskeletal: Negative for back pain.   Skin: Negative.    Neurological: Positive for headaches. Negative for dizziness, tremors, focal weakness, seizures, syncope, facial asymmetry, speech difficulty, weakness, light-headedness and numbness.   Psychiatric/Behavioral: Negative.    All other systems reviewed and are negative.      Past Medical History:   Diagnosis Date   • Abdominal pain    • Abdominal swelling    • Hoyos's disease    • Bipolar 1 disorder    • Brain tumor     R Frontal Lobe per pt   • Brain tumor 2014   • Brain tumor (benign)    • Constipation    • DDD (degenerative disc disease), cervical 05/29/2017   • DDD (degenerative disc disease), cervical    • Diarrhea    • Fibromyalgia    • IBS (irritable bowel syndrome)    • Migraine    • Nausea & vomiting    • PONV (postoperative nausea and vomiting)    • PTSD (post-traumatic stress disorder)    • Rectal bleeding        Allergies   Allergen Reactions   • Ativan [Lorazepam] Hallucinations     confusion   • Sulfa Antibiotics Shortness Of Breath and Swelling   • Compazine [Prochlorperazine Edisylate] Hives   • Demerol [Meperidine] Hives   • Droperidol Itching   • Toradol [Ketorolac Tromethamine] Hives and Itching   • Zofran [Ondansetron Hcl] Rash       Past Surgical History:   Procedure Laterality Date   • ANAL SCOPE N/A 7/28/2016    Procedure: ANAL SCOPE;  Surgeon: Kael Lopez MD;  Location: [facility];  Service:    • APPENDECTOMY     •  COLONOSCOPY N/A 6/30/2016    Procedure: COLONOSCOPY  CPTCODE:15143;  Surgeon: Jose Antonio Belle III, MD;  Location: [facility] OR;  Service:    • COLONOSCOPY N/A 7/7/2016    Procedure: COLONOSCOPY (90420) CPT;  Surgeon: Jose Antonio Belle III, MD;  Location: [facility] OR;  Service:    • CYSTOSCOPY RETROGRADE PYELOGRAM Bilateral 4/28/2017    Procedure: CYSTOSCOPY RETROGRADE PYELOGRAM;  Surgeon: Mu Blunt MD;  Location: [facility] OR;  Service:    • ENDOSCOPY N/A 6/30/2016    Procedure: ESOPHAGOGASTRODUODENOSCOPY WITH BIOPSY  CPTCODE:37251;  Surgeon: Jose Antonio Belle III, MD;  Location: [facility] OR;  Service:    • HEMORRHOIDECTOMY N/A 7/28/2016    Procedure: HEMORRHOID STAPLING;  Surgeon: Kael Lopez MD;  Location: [facility] OR;  Service:    • HYSTERECTOMY     • KNEE SURGERY     • LAPAROSCOPIC SALPINGOOPHERECTOMY     • PORTACATH PLACEMENT N/A 7/28/2017    Procedure: INSERTION OF PORTACATH;  Surgeon: Celso Arredondo MD;  Location: [facility] OR;  Service:    • SHOULDER SURGERY      3 times       Family History   Problem Relation Age of Onset   • Crohn's disease Other    • Hypertension Other    • Diabetes Other    • Irritable bowel syndrome Other        Social History     Social History   • Marital status:      Spouse name: N/A   • Number of children: N/A   • Years of education: N/A     Social History Main Topics   • Smoking status: Former Smoker     Packs/day: 1.00     Years: 20.00     Quit date: 11/1/2015   • Smokeless tobacco: Never Used   • Alcohol use No   • Drug use: Yes     Special: Marijuana   • Sexual activity: Defer     Other Topics Concern   • None     Social History Narrative           Objective   Physical Exam   Constitutional: She is oriented to person, place, and time. She appears well-developed and well-nourished. No distress.   HENT:   Head: Normocephalic and atraumatic.   Right Ear: External ear normal.   Left Ear: External ear normal.   Nose: Nose normal.   Eyes: Conjunctivae and EOM  are normal. Pupils are equal, round, and reactive to light.   Neck: Normal range of motion. Neck supple. No JVD present. No tracheal deviation present.   Cardiovascular: Normal rate, regular rhythm and normal heart sounds.    No murmur heard.  Pulmonary/Chest: Effort normal and breath sounds normal. No respiratory distress. She has no wheezes.   Abdominal: Soft. Bowel sounds are normal. There is no tenderness.   Musculoskeletal: Normal range of motion. She exhibits no edema or deformity.   Neurological: She is alert and oriented to person, place, and time. No cranial nerve deficit.   Normal EOMs bilaterally. No slurred speech. No facial asymmetry. 5/5 bilateral  and plantar flexion strength. Can lift all 4 extremities and hold them at 90 degrees.   Skin: Skin is warm and dry. No rash noted. She is not diaphoretic. No erythema. No pallor.   Psychiatric: She has a normal mood and affect. Her behavior is normal. Thought content normal.   Nursing note and vitals reviewed.      Procedures         ED Course  ED Course   Value Comment By Time   CT Head Without Contrast Stroke Protocol Meningioma stable from previous CTs and patient is aware of it; no acute intracranial findings per [facility] report. TAMI Mancia 08/11 2203                  MDM  Number of Diagnoses or Management Options  established and worsening     Amount and/or Complexity of Data Reviewed  Clinical lab tests: reviewed and ordered  Tests in the radiology section of CPT®: reviewed and ordered    Risk of Complications, Morbidity, and/or Mortality  Presenting problems: moderate  Diagnostic procedures: moderate  Management options: low    Patient Progress  Patient progress: stable      Final diagnoses:   Chronic migraine w/o aura w/o status migrainosus, not intractable            TAMI Weir  08/12/17 [id number]

## 2017-08-15 LAB — BACTERIA SPEC AEROBE CULT: ABNORMAL

## 2017-08-17 ENCOUNTER — HOSPITAL ENCOUNTER (EMERGENCY)
Facility: HOSPITAL | Age: 42
Discharge: HOME | End: 2017-08-18
Attending: EMERGENCY MEDICINE | Admitting: EMERGENCY MEDICINE

## 2017-08-17 DIAGNOSIS — G43.111 INTRACTABLE MIGRAINE WITH AURA WITH STATUS MIGRAINOSUS: Primary | ICD-10-CM

## 2017-08-17 LAB
6-ACETYL MORPHINE: NEGATIVE
AMPHET+METHAMPHET UR QL: NEGATIVE
B-HCG UR QL: NEGATIVE
BARBITURATES UR QL SCN: POSITIVE
BENZODIAZ UR QL SCN: POSITIVE
BUPRENORPHINE SERPL-MCNC: NEGATIVE NG/ML
CANNABINOIDS SERPL QL: POSITIVE
COCAINE UR QL: NEGATIVE
METHADONE UR QL SCN: NEGATIVE
OPIATES UR QL: POSITIVE
OXYCODONE UR QL SCN: NEGATIVE
PCP UR QL SCN: NEGATIVE

## 2017-08-17 PROCEDURE — 80307 DRUG TEST PRSMV CHEM ANLYZR: CPT | Performed by: EMERGENCY MEDICINE

## 2017-08-17 PROCEDURE — 25010000002 PROMETHAZINE PER 50 MG: Performed by: EMERGENCY MEDICINE

## 2017-08-17 PROCEDURE — 25010000002 DEXAMETHASONE PER 1 MG: Performed by: EMERGENCY MEDICINE

## 2017-08-17 PROCEDURE — 96365 THER/PROPH/DIAG IV INF INIT: CPT

## 2017-08-17 PROCEDURE — 81025 URINE PREGNANCY TEST: CPT | Performed by: EMERGENCY MEDICINE

## 2017-08-17 PROCEDURE — 96375 TX/PRO/DX INJ NEW DRUG ADDON: CPT

## 2017-08-17 PROCEDURE — 25010000002 MORPHINE PER 10 MG: Performed by: EMERGENCY MEDICINE

## 2017-08-17 PROCEDURE — 99283 EMERGENCY DEPT VISIT LOW MDM: CPT

## 2017-08-17 PROCEDURE — 96361 HYDRATE IV INFUSION ADD-ON: CPT

## 2017-08-17 PROCEDURE — 25010000002 DIPHENHYDRAMINE PER 50 MG: Performed by: EMERGENCY MEDICINE

## 2017-08-17 RX ORDER — PROMETHAZINE HYDROCHLORIDE 25 MG/ML
12.5 INJECTION, SOLUTION INTRAMUSCULAR; INTRAVENOUS ONCE
Status: COMPLETED | OUTPATIENT
Start: 2017-08-17 | End: 2017-08-17

## 2017-08-17 RX ORDER — SODIUM CHLORIDE 0.9 % (FLUSH) 0.9 %
10 SYRINGE (ML) INJECTION AS NEEDED
Status: DISCONTINUED | OUTPATIENT
Start: 2017-08-17 | End: 2017-08-18 | Stop reason: HOSPADM

## 2017-08-17 RX ORDER — DIPHENHYDRAMINE HYDROCHLORIDE 50 MG/ML
25 INJECTION INTRAMUSCULAR; INTRAVENOUS EVERY 6 HOURS PRN
Status: DISCONTINUED | OUTPATIENT
Start: 2017-08-17 | End: 2017-08-18 | Stop reason: HOSPADM

## 2017-08-17 RX ADMIN — DIPHENHYDRAMINE HYDROCHLORIDE 25 MG: 50 INJECTION INTRAMUSCULAR; INTRAVENOUS at 23:14

## 2017-08-17 RX ADMIN — DEXAMETHASONE SODIUM PHOSPHATE 10 MG: 4 INJECTION, SOLUTION INTRAMUSCULAR; INTRAVENOUS at 23:34

## 2017-08-17 RX ADMIN — MORPHINE SULFATE 4 MG: 4 INJECTION, SOLUTION INTRAMUSCULAR; INTRAVENOUS at 23:16

## 2017-08-17 RX ADMIN — PROMETHAZINE HYDROCHLORIDE 12.5 MG: 25 INJECTION INTRAMUSCULAR; INTRAVENOUS at 23:14

## 2017-08-17 RX ADMIN — SODIUM CHLORIDE 1000 ML: 0.9 INJECTION, SOLUTION INTRAVENOUS at 23:13

## 2017-08-18 VITALS
DIASTOLIC BLOOD PRESSURE: 81 MMHG | RESPIRATION RATE: 18 BRPM | HEIGHT: 69 IN | OXYGEN SATURATION: 99 % | HEART RATE: 87 BPM | WEIGHT: 168 LBS | SYSTOLIC BLOOD PRESSURE: 114 MMHG | BODY MASS INDEX: 24.88 KG/M2 | TEMPERATURE: 97.6 F

## 2017-08-18 PROCEDURE — 96376 TX/PRO/DX INJ SAME DRUG ADON: CPT

## 2017-08-18 PROCEDURE — 25010000002 HEPARIN FLUSH (PORCINE) 100 UNIT/ML SOLUTION: Performed by: EMERGENCY MEDICINE

## 2017-08-18 PROCEDURE — 25010000002 MORPHINE SULFATE (PF) 2 MG/ML SOLUTION

## 2017-08-18 RX ORDER — MORPHINE SULFATE 2 MG/ML
2 INJECTION, SOLUTION INTRAMUSCULAR; INTRAVENOUS ONCE
Status: COMPLETED | OUTPATIENT
Start: 2017-08-18 | End: 2017-08-18

## 2017-08-18 RX ORDER — METOCLOPRAMIDE HYDROCHLORIDE 5 MG/ML
10 INJECTION INTRAMUSCULAR; INTRAVENOUS ONCE
Status: DISCONTINUED | OUTPATIENT
Start: 2017-08-18 | End: 2017-08-18

## 2017-08-18 RX ORDER — MORPHINE SULFATE 2 MG/ML
INJECTION, SOLUTION INTRAMUSCULAR; INTRAVENOUS
Status: COMPLETED
Start: 2017-08-18 | End: 2017-08-18

## 2017-08-18 RX ADMIN — MORPHINE SULFATE 2 MG: 2 INJECTION, SOLUTION INTRAMUSCULAR; INTRAVENOUS at 00:09

## 2017-08-18 RX ADMIN — SODIUM CHLORIDE, PRESERVATIVE FREE 300 UNITS: 5 INJECTION INTRAVENOUS at 00:10

## 2017-08-18 NOTE — DISCHARGE INSTRUCTIONS

## 2017-08-18 NOTE — ED PROVIDER NOTES
Subjective   Patient is a 42 y.o. female presenting with migraines.   Migraine   Pain location:  Generalized  Quality:  Sharp  Radiates to:  Does not radiate  Severity currently:  10/10  Severity at highest:  10/10  Onset quality:  Gradual  Timing:  Constant  Progression:  Worsening  Chronicity:  Chronic  Similar to prior headaches: yes    Context: activity, bright light and emotional stress    Context: not caffeine, not coughing, not defecating, not eating, not exposure to cold air, not intercourse, not loud noise and not straining    Relieved by:  Nothing  Worsened by:  Activity and light  Ineffective treatments:  Prescription medications  Associated symptoms: no abdominal pain, no back pain, no blurred vision, no congestion, no cough, no diarrhea, no dizziness, no drainage, no ear pain, no eye pain, no facial pain, no fatigue, no fever, no focal weakness, no hearing loss, no loss of balance, no myalgias, no nausea, no near-syncope, no neck pain, no neck stiffness, no numbness, no paresthesias, no photophobia, no seizures, no sinus pressure, no sore throat, no swollen glands, no syncope, no tingling, no URI, no visual change, no vomiting and no weakness        Review of Systems   Constitutional: Negative for activity change, appetite change, chills, diaphoresis, fatigue and fever.   HENT: Negative for congestion, ear pain, hearing loss, postnasal drip, sinus pressure and sore throat.    Eyes: Negative for blurred vision, photophobia, pain and redness.   Respiratory: Negative for cough, chest tightness, shortness of breath and wheezing.    Cardiovascular: Negative for chest pain, palpitations, leg swelling, syncope and near-syncope.   Gastrointestinal: Negative for abdominal pain, diarrhea, nausea and vomiting.   Genitourinary: Negative for dysuria and urgency.   Musculoskeletal: Negative for arthralgias, back pain, myalgias, neck pain and neck stiffness.   Skin: Negative for pallor, rash and wound.   Neurological:  Positive for headaches. Negative for dizziness, focal weakness, seizures, speech difficulty, weakness, numbness, paresthesias and loss of balance.   Psychiatric/Behavioral: Negative for agitation, behavioral problems, confusion and decreased concentration.       Past Medical History:   Diagnosis Date   • Abdominal pain    • Abdominal swelling    • Hoyos's disease    • Bipolar 1 disorder    • Brain tumor     R Frontal Lobe per pt   • Brain tumor 2014   • Brain tumor (benign)    • Constipation    • DDD (degenerative disc disease), cervical 05/29/2017   • DDD (degenerative disc disease), cervical    • Diarrhea    • Fibromyalgia    • IBS (irritable bowel syndrome)    • Migraine    • Nausea & vomiting    • PONV (postoperative nausea and vomiting)    • PTSD (post-traumatic stress disorder)    • Rectal bleeding        Allergies   Allergen Reactions   • Ativan [Lorazepam] Hallucinations     confusion   • Sulfa Antibiotics Shortness Of Breath and Swelling   • Compazine [Prochlorperazine Edisylate] Hives   • Demerol [Meperidine] Hives   • Droperidol Itching   • Metoclopramide Swelling   • Toradol [Ketorolac Tromethamine] Hives and Itching   • Zofran [Ondansetron Hcl] Rash       Past Surgical History:   Procedure Laterality Date   • ANAL SCOPE N/A 7/28/2016    Procedure: ANAL SCOPE;  Surgeon: Kael Lopez MD;  Location: [facility] OR;  Service:    • APPENDECTOMY     • COLONOSCOPY N/A 6/30/2016    Procedure: COLONOSCOPY  CPTCODE:45666;  Surgeon: Jose Antonio Belle III, MD;  Location: [facility] OR;  Service:    • COLONOSCOPY N/A 7/7/2016    Procedure: COLONOSCOPY (92645) CPT;  Surgeon: Jose Antonio Belle III, MD;  Location: [facility] OR;  Service:    • CYSTOSCOPY RETROGRADE PYELOGRAM Bilateral 4/28/2017    Procedure: CYSTOSCOPY RETROGRADE PYELOGRAM;  Surgeon: Mu Blunt MD;  Location: [facility] OR;  Service:    • ENDOSCOPY N/A 6/30/2016    Procedure: ESOPHAGOGASTRODUODENOSCOPY WITH BIOPSY  CPTCODE:76582;  Surgeon: Jose Antonio  Reynold Belle III, MD;  Location: [facility] OR;  Service:    • HEMORRHOIDECTOMY N/A 7/28/2016    Procedure: HEMORRHOID STAPLING;  Surgeon: Kael Lopez MD;  Location: [facility] OR;  Service:    • HYSTERECTOMY     • KNEE SURGERY     • LAPAROSCOPIC SALPINGOOPHERECTOMY     • PORTACATH PLACEMENT N/A 7/28/2017    Procedure: INSERTION OF PORTACATH;  Surgeon: Celso Arredondo MD;  Location: [facility] OR;  Service:    • SHOULDER SURGERY      3 times       Family History   Problem Relation Age of Onset   • Crohn's disease Other    • Hypertension Other    • Diabetes Other    • Irritable bowel syndrome Other        Social History     Social History   • Marital status:      Spouse name: N/A   • Number of children: N/A   • Years of education: N/A     Social History Main Topics   • Smoking status: Former Smoker     Packs/day: 1.00     Years: 20.00     Quit date: 11/1/2015   • Smokeless tobacco: Never Used   • Alcohol use No   • Drug use: Yes     Special: Marijuana   • Sexual activity: Defer     Other Topics Concern   • None     Social History Narrative           Objective   Physical Exam   Constitutional: She is oriented to person, place, and time. She appears well-developed and well-nourished.  Non-toxic appearance. No distress.   HENT:   Head: Normocephalic and atraumatic.   Right Ear: External ear normal.   Left Ear: External ear normal.   Nose: Nose normal.   Mouth/Throat: Oropharynx is clear and moist and mucous membranes are normal. No oropharyngeal exudate. No tonsillar exudate.   Eyes: Conjunctivae, EOM and lids are normal. Pupils are equal, round, and reactive to light.   Neck: Normal range of motion and full passive range of motion without pain. Neck supple. No thyromegaly present.   Cardiovascular: Normal rate, regular rhythm, S1 normal, S2 normal, normal heart sounds, intact distal pulses and normal pulses.    Pulmonary/Chest: Effort normal and breath sounds normal. No tachypnea. No respiratory distress. She has  no decreased breath sounds. She has no wheezes. She has no rales. She exhibits no tenderness.   Abdominal: Soft. Normal appearance and bowel sounds are normal. She exhibits no distension. There is no tenderness. There is no rebound and no guarding.   Musculoskeletal: Normal range of motion. She exhibits no edema, tenderness or deformity.   Lymphadenopathy:     She has no cervical adenopathy.   Neurological: She is alert and oriented to person, place, and time. She has normal strength. No cranial nerve deficit or sensory deficit. GCS eye subscore is 4. GCS verbal subscore is 5. GCS motor subscore is 6.   Skin: Skin is warm, dry and intact. No rash noted. She is not diaphoretic. No erythema. No pallor.   Psychiatric: She has a normal mood and affect. Her speech is normal and behavior is normal. Judgment and thought content normal. Cognition and memory are normal.   Nursing note and vitals reviewed.      Procedures         ED Course  ED Course   Comment By Time   Patient suffers from chronic migraine headaches.  She was discharged from [facility] earlier today with a prescription from her neurologist Dr. Sunshine.  However, the medications were too expensive to fill at this time.  Patient has a PA pending, and came to the ED for relief.  Medications are supposed to be filled on Friday, August 18, 2017.  I provided her with temporary relief here in the ED, and she was discharged home in stable condition to follow up with Dr. Sunshine.  Informed her that I would not be giving her chronic pain medication here in the ED. Jaiden Bonner MD 08/18 0124                  MDM  Number of Diagnoses or Management Options  Intractable migraine with aura with status migrainosus: new and requires workup  Risk of Complications, Morbidity, and/or Mortality  Presenting problems: low  Diagnostic procedures: low  Management options: low    Patient Progress  Patient progress: stable      Final diagnoses:   Intractable migraine  with aura with status migrainosus            Jaiden Bonner MD  08/18/17 0124

## 2017-08-18 NOTE — ED NOTES
PATIENT UPDATED ON WAIT TIME. VERBALIZES UNDERSTANDING. NO ACUTE DISTRESS NOTED AT THIS TIME.     Chay Dave RN  08/17/17 [id number]

## 2017-08-18 NOTE — ED NOTES
Patient complains of pain in the temple region bilaterally. Patient states that the pain has persisted for several days with no relief.     Airam Harris RN  08/18/17 0030

## 2017-08-26 ENCOUNTER — HOSPITAL ENCOUNTER (EMERGENCY)
Facility: HOSPITAL | Age: 42
Discharge: HOME | End: 2017-08-26
Attending: EMERGENCY MEDICINE | Admitting: EMERGENCY MEDICINE

## 2017-08-26 DIAGNOSIS — R07.89 ATYPICAL CHEST PAIN: Primary | ICD-10-CM

## 2017-08-26 DIAGNOSIS — G44.209 TENSION HEADACHE: ICD-10-CM

## 2017-08-26 LAB
ALBUMIN SERPL-MCNC: 4.4 G/DL (ref 3.5–5)
ALBUMIN/GLOB SERPL: 1.6 G/DL (ref 1.5–2.5)
ALP SERPL-CCNC: 60 U/L (ref 35–104)
ALT SERPL W P-5'-P-CCNC: 55 U/L (ref 10–36)
ANION GAP SERPL CALCULATED.3IONS-SCNC: 7.5 MMOL/L (ref 3.6–11.2)
AST SERPL-CCNC: 47 U/L (ref 10–30)
BASOPHILS # BLD AUTO: 0.01 10*3/MM3 (ref 0–0.3)
BASOPHILS NFR BLD AUTO: 0.1 % (ref 0–2)
BILIRUB SERPL-MCNC: 0.3 MG/DL (ref 0.2–1.8)
BUN BLD-MCNC: 5 MG/DL (ref 7–21)
BUN/CREAT SERPL: 7.9 (ref 7–25)
CALCIUM SPEC-SCNC: 9.3 MG/DL (ref 7.7–10)
CHLORIDE SERPL-SCNC: 106 MMOL/L (ref 99–112)
CK MB SERPL-CCNC: <0.18 NG/ML (ref 0–5)
CO2 SERPL-SCNC: 28.5 MMOL/L (ref 24.3–31.9)
CREAT BLD-MCNC: 0.63 MG/DL (ref 0.43–1.29)
DEPRECATED RDW RBC AUTO: 42 FL (ref 37–54)
EOSINOPHIL # BLD AUTO: 0.07 10*3/MM3 (ref 0–0.7)
EOSINOPHIL NFR BLD AUTO: 1 % (ref 0–5)
ERYTHROCYTE [DISTWIDTH] IN BLOOD BY AUTOMATED COUNT: 13 % (ref 11.5–14.5)
GFR SERPL CREATININE-BSD FRML MDRD: 104 ML/MIN/1.73
GLOBULIN UR ELPH-MCNC: 2.8 GM/DL
GLUCOSE BLD-MCNC: 98 MG/DL (ref 70–110)
HCT VFR BLD AUTO: 40.3 % (ref 37–47)
HGB BLD-MCNC: 13.6 G/DL (ref 12–16)
IMM GRANULOCYTES # BLD: 0.01 10*3/MM3 (ref 0–0.03)
IMM GRANULOCYTES NFR BLD: 0.1 % (ref 0–0.5)
LYMPHOCYTES # BLD AUTO: 2.9 10*3/MM3 (ref 1–3)
LYMPHOCYTES NFR BLD AUTO: 40.6 % (ref 21–51)
MCH RBC QN AUTO: 31.1 PG (ref 27–33)
MCHC RBC AUTO-ENTMCNC: 33.7 G/DL (ref 33–37)
MCV RBC AUTO: 92 FL (ref 80–94)
MONOCYTES # BLD AUTO: 0.7 10*3/MM3 (ref 0.1–0.9)
MONOCYTES NFR BLD AUTO: 9.8 % (ref 0–10)
NEUTROPHILS # BLD AUTO: 3.46 10*3/MM3 (ref 1.4–6.5)
NEUTROPHILS NFR BLD AUTO: 48.4 % (ref 30–70)
OSMOLALITY SERPL CALC.SUM OF ELEC: 280.4 MOSM/KG (ref 273–305)
PLATELET # BLD AUTO: 131 10*3/MM3 (ref 130–400)
PMV BLD AUTO: 10.5 FL (ref 6–10)
POTASSIUM BLD-SCNC: 3.1 MMOL/L (ref 3.5–5.3)
PROT SERPL-MCNC: 7.2 G/DL (ref 6–8)
RBC # BLD AUTO: 4.38 10*6/MM3 (ref 4.2–5.4)
SODIUM BLD-SCNC: 142 MMOL/L (ref 135–153)
TROPONIN I SERPL-MCNC: <0.006 NG/ML
WBC NRBC COR # BLD: 7.15 10*3/MM3 (ref 4.5–12.5)

## 2017-08-26 PROCEDURE — 99283 EMERGENCY DEPT VISIT LOW MDM: CPT

## 2017-08-26 PROCEDURE — 93005 ELECTROCARDIOGRAM TRACING: CPT | Performed by: EMERGENCY MEDICINE

## 2017-08-26 PROCEDURE — 25010000002 BUTORPHANOL PER 1 MG: Performed by: EMERGENCY MEDICINE

## 2017-08-26 PROCEDURE — 96375 TX/PRO/DX INJ NEW DRUG ADDON: CPT

## 2017-08-26 PROCEDURE — 93010 ELECTROCARDIOGRAM REPORT: CPT | Performed by: INTERNAL MEDICINE

## 2017-08-26 PROCEDURE — 25010000002 PROMETHAZINE PER 50 MG: Performed by: EMERGENCY MEDICINE

## 2017-08-26 PROCEDURE — 82553 CREATINE MB FRACTION: CPT | Performed by: EMERGENCY MEDICINE

## 2017-08-26 PROCEDURE — 80053 COMPREHEN METABOLIC PANEL: CPT | Performed by: EMERGENCY MEDICINE

## 2017-08-26 PROCEDURE — 96374 THER/PROPH/DIAG INJ IV PUSH: CPT

## 2017-08-26 PROCEDURE — 84484 ASSAY OF TROPONIN QUANT: CPT | Performed by: EMERGENCY MEDICINE

## 2017-08-26 PROCEDURE — 85025 COMPLETE CBC W/AUTO DIFF WBC: CPT | Performed by: EMERGENCY MEDICINE

## 2017-08-26 RX ORDER — POTASSIUM CHLORIDE 20 MEQ/1
40 TABLET, EXTENDED RELEASE ORAL ONCE
Status: DISCONTINUED | OUTPATIENT
Start: 2017-08-26 | End: 2017-08-26 | Stop reason: HOSPADM

## 2017-08-26 RX ORDER — PROMETHAZINE HYDROCHLORIDE 25 MG/ML
12.5 INJECTION, SOLUTION INTRAMUSCULAR; INTRAVENOUS ONCE
Status: COMPLETED | OUTPATIENT
Start: 2017-08-26 | End: 2017-08-26

## 2017-08-26 RX ORDER — POTASSIUM CHLORIDE 750 MG/1
10 TABLET, FILM COATED, EXTENDED RELEASE ORAL DAILY
Qty: 12 TABLET | Refills: 0 | Status: SHIPPED | OUTPATIENT
Start: 2017-08-26 | End: 2018-03-22 | Stop reason: HOSPADM

## 2017-08-26 RX ORDER — SODIUM CHLORIDE 0.9 % (FLUSH) 0.9 %
10 SYRINGE (ML) INJECTION AS NEEDED
Status: DISCONTINUED | OUTPATIENT
Start: 2017-08-26 | End: 2017-08-26 | Stop reason: HOSPADM

## 2017-08-26 RX ADMIN — BUTORPHANOL TARTRATE 1.5 MG: 2 INJECTION, SOLUTION INTRAMUSCULAR; INTRAVENOUS at 01:48

## 2017-08-26 RX ADMIN — PROMETHAZINE HYDROCHLORIDE 12.5 MG: 25 INJECTION, SOLUTION INTRAMUSCULAR; INTRAVENOUS at 01:50

## 2017-08-26 NOTE — ED PROVIDER NOTES
Subjective   Patient is a 42 y.o. female presenting with chest pain.   History provided by:  Patient  Chest Pain   Pain location:  Unable to specify  Pain quality: sharp    Pain radiates to:  Does not radiate  Pain severity:  Mild  Onset quality:  Gradual  Progression:  Waxing and waning  Context: breathing and movement    Relieved by:  Nothing  Worsened by:  Deep breathing, coughing and movement  Associated symptoms: headache    Associated symptoms: no abdominal pain and no fever        Review of Systems   Constitutional: Negative.  Negative for fever.   Respiratory: Negative.    Cardiovascular: Positive for chest pain.   Gastrointestinal: Negative.  Negative for abdominal pain.   Endocrine: Negative.    Genitourinary: Negative.  Negative for dysuria.   Skin: Negative.    Neurological: Positive for headaches.   Psychiatric/Behavioral: Negative.    All other systems reviewed and are negative.      Past Medical History:   Diagnosis Date   • Abdominal pain    • Abdominal swelling    • Hoyos's disease    • Bipolar 1 disorder    • Brain tumor     R Frontal Lobe per pt   • Brain tumor 2014   • Brain tumor (benign)    • Constipation    • DDD (degenerative disc disease), cervical 05/29/2017   • DDD (degenerative disc disease), cervical    • Diarrhea    • Fibromyalgia    • IBS (irritable bowel syndrome)    • Migraine    • Nausea & vomiting    • PONV (postoperative nausea and vomiting)    • PTSD (post-traumatic stress disorder)    • Rectal bleeding        Allergies   Allergen Reactions   • Ativan [Lorazepam] Hallucinations     confusion   • Sulfa Antibiotics Shortness Of Breath and Swelling   • Compazine [Prochlorperazine Edisylate] Hives   • Demerol [Meperidine] Hives   • Droperidol Itching   • Metoclopramide Swelling   • Toradol [Ketorolac Tromethamine] Hives and Itching   • Zofran [Ondansetron Hcl] Rash       Past Surgical History:   Procedure Laterality Date   • ANAL SCOPE N/A 7/28/2016    Procedure: ANAL SCOPE;   Surgeon: Kael Lopez MD;  Location: [facility] OR;  Service:    • APPENDECTOMY     • COLONOSCOPY N/A 6/30/2016    Procedure: COLONOSCOPY  CPTCODE:38847;  Surgeon: Jose Antonio Belle III, MD;  Location: [facility] OR;  Service:    • COLONOSCOPY N/A 7/7/2016    Procedure: COLONOSCOPY (32413) CPT;  Surgeon: Jose Antonio Belle III, MD;  Location: [facility] OR;  Service:    • CYSTOSCOPY RETROGRADE PYELOGRAM Bilateral 4/28/2017    Procedure: CYSTOSCOPY RETROGRADE PYELOGRAM;  Surgeon: Mu Blunt MD;  Location: [facility] OR;  Service:    • ENDOSCOPY N/A 6/30/2016    Procedure: ESOPHAGOGASTRODUODENOSCOPY WITH BIOPSY  CPTCODE:91065;  Surgeon: Jose Antonio Belle III, MD;  Location: [facility] OR;  Service:    • HEMORRHOIDECTOMY N/A 7/28/2016    Procedure: HEMORRHOID STAPLING;  Surgeon: Kael Lopez MD;  Location: [facility] OR;  Service:    • HYSTERECTOMY     • KNEE SURGERY     • LAPAROSCOPIC SALPINGOOPHERECTOMY     • PORTACATH PLACEMENT N/A 7/28/2017    Procedure: INSERTION OF PORTACATH;  Surgeon: Celso Arredondo MD;  Location: [facility] OR;  Service:    • SHOULDER SURGERY      3 times       Family History   Problem Relation Age of Onset   • Crohn's disease Other    • Hypertension Other    • Diabetes Other    • Irritable bowel syndrome Other        Social History     Social History   • Marital status:      Spouse name: N/A   • Number of children: N/A   • Years of education: N/A     Social History Main Topics   • Smoking status: Former Smoker     Packs/day: 1.00     Years: 20.00     Quit date: 11/1/2015   • Smokeless tobacco: Never Used   • Alcohol use No   • Drug use: Yes     Special: Marijuana   • Sexual activity: Defer     Other Topics Concern   • Not on file     Social History Narrative           Objective   Physical Exam   Constitutional: She is oriented to person, place, and time. She appears well-developed and well-nourished. No distress.   HENT:   Head: Normocephalic and atraumatic.   Right Ear: External ear  normal.   Left Ear: External ear normal.   Nose: Nose normal.   Headache     Eyes: Conjunctivae and EOM are normal. Pupils are equal, round, and reactive to light.   Neck: Normal range of motion. Neck supple. No JVD present. No tracheal deviation present.   Cardiovascular: Normal rate, regular rhythm and normal heart sounds.    No murmur heard.  Pulmonary/Chest: Effort normal and breath sounds normal. No respiratory distress. She has no wheezes.   Abdominal: Soft. Bowel sounds are normal. There is no tenderness.   Musculoskeletal: Normal range of motion. She exhibits no edema or deformity.   Neurological: She is alert and oriented to person, place, and time. No cranial nerve deficit.   Skin: Skin is warm and dry. No rash noted. She is not diaphoretic. No erythema. No pallor.   Psychiatric: She has a normal mood and affect. Her behavior is normal. Thought content normal.   Nursing note and vitals reviewed.      Procedures         ED Course  ED Course   Comment By Time   EKG no acute ST-T abnormalities noted, sinus tachycardia, rate 107 Mitesh Garza MD 08/26 0230                  [facility]    Final diagnoses:   Atypical chest pain   Tension headache            Mitesh Garza MD  08/27/17 0065

## 2017-08-28 VITALS
HEIGHT: 69 IN | TEMPERATURE: 97.9 F | WEIGHT: 168 LBS | OXYGEN SATURATION: 100 % | RESPIRATION RATE: 18 BRPM | DIASTOLIC BLOOD PRESSURE: 76 MMHG | HEART RATE: 78 BPM | SYSTOLIC BLOOD PRESSURE: 124 MMHG | BODY MASS INDEX: 24.88 KG/M2

## 2017-09-09 ENCOUNTER — HOSPITAL ENCOUNTER (EMERGENCY)
Facility: HOSPITAL | Age: 42
Discharge: HOME | End: 2017-09-09
Attending: FAMILY MEDICINE | Admitting: FAMILY MEDICINE

## 2017-09-09 VITALS
HEART RATE: 88 BPM | DIASTOLIC BLOOD PRESSURE: 76 MMHG | BODY MASS INDEX: 24.88 KG/M2 | RESPIRATION RATE: 18 BRPM | SYSTOLIC BLOOD PRESSURE: 139 MMHG | WEIGHT: 168 LBS | HEIGHT: 69 IN | OXYGEN SATURATION: 99 % | TEMPERATURE: 98.3 F

## 2017-09-09 DIAGNOSIS — G89.29 ABDOMINAL PAIN, CHRONIC, RIGHT UPPER QUADRANT: Primary | ICD-10-CM

## 2017-09-09 DIAGNOSIS — R10.11 ABDOMINAL PAIN, CHRONIC, RIGHT UPPER QUADRANT: Primary | ICD-10-CM

## 2017-09-09 LAB
6-ACETYL MORPHINE: NEGATIVE
ALBUMIN SERPL-MCNC: 4.4 G/DL (ref 3.5–5)
ALBUMIN/GLOB SERPL: 1.5 G/DL (ref 1.5–2.5)
ALP SERPL-CCNC: 64 U/L (ref 35–104)
ALT SERPL W P-5'-P-CCNC: 67 U/L (ref 10–36)
AMPHET+METHAMPHET UR QL: NEGATIVE
ANION GAP SERPL CALCULATED.3IONS-SCNC: 5.3 MMOL/L (ref 3.6–11.2)
AST SERPL-CCNC: 59 U/L (ref 10–30)
BARBITURATES UR QL SCN: POSITIVE
BASOPHILS # BLD AUTO: 0 10*3/MM3 (ref 0–0.3)
BASOPHILS NFR BLD AUTO: 0 % (ref 0–2)
BENZODIAZ UR QL SCN: NEGATIVE
BILIRUB SERPL-MCNC: 0.6 MG/DL (ref 0.2–1.8)
BILIRUB UR QL STRIP: NEGATIVE
BUN BLD-MCNC: <5 MG/DL (ref 7–21)
BUN/CREAT SERPL: ABNORMAL (ref 7–25)
BUPRENORPHINE SERPL-MCNC: NEGATIVE NG/ML
CALCIUM SPEC-SCNC: 9.5 MG/DL (ref 7.7–10)
CANNABINOIDS SERPL QL: POSITIVE
CHLORIDE SERPL-SCNC: 109 MMOL/L (ref 99–112)
CLARITY UR: CLEAR
CO2 SERPL-SCNC: 28.7 MMOL/L (ref 24.3–31.9)
COCAINE UR QL: NEGATIVE
COLOR UR: YELLOW
CREAT BLD-MCNC: 0.63 MG/DL (ref 0.43–1.29)
DEPRECATED RDW RBC AUTO: 41.4 FL (ref 37–54)
EOSINOPHIL # BLD AUTO: 0.11 10*3/MM3 (ref 0–0.7)
EOSINOPHIL NFR BLD AUTO: 1.9 % (ref 0–5)
ERYTHROCYTE [DISTWIDTH] IN BLOOD BY AUTOMATED COUNT: 12.6 % (ref 11.5–14.5)
GFR SERPL CREATININE-BSD FRML MDRD: 104 ML/MIN/1.73
GLOBULIN UR ELPH-MCNC: 3 GM/DL
GLUCOSE BLD-MCNC: 97 MG/DL (ref 70–110)
GLUCOSE UR STRIP-MCNC: NEGATIVE MG/DL
HCT VFR BLD AUTO: 41.9 % (ref 37–47)
HGB BLD-MCNC: 14.3 G/DL (ref 12–16)
HGB UR QL STRIP.AUTO: NEGATIVE
IMM GRANULOCYTES # BLD: 0.01 10*3/MM3 (ref 0–0.03)
IMM GRANULOCYTES NFR BLD: 0.2 % (ref 0–0.5)
KETONES UR QL STRIP: NEGATIVE
LEUKOCYTE ESTERASE UR QL STRIP.AUTO: NEGATIVE
LYMPHOCYTES # BLD AUTO: 2.26 10*3/MM3 (ref 1–3)
LYMPHOCYTES NFR BLD AUTO: 38.8 % (ref 21–51)
MCH RBC QN AUTO: 31.4 PG (ref 27–33)
MCHC RBC AUTO-ENTMCNC: 34.1 G/DL (ref 33–37)
MCV RBC AUTO: 91.9 FL (ref 80–94)
METHADONE UR QL SCN: NEGATIVE
MONOCYTES # BLD AUTO: 0.55 10*3/MM3 (ref 0.1–0.9)
MONOCYTES NFR BLD AUTO: 9.4 % (ref 0–10)
NEUTROPHILS # BLD AUTO: 2.9 10*3/MM3 (ref 1.4–6.5)
NEUTROPHILS NFR BLD AUTO: 49.7 % (ref 30–70)
NITRITE UR QL STRIP: NEGATIVE
OPIATES UR QL: NEGATIVE
OSMOLALITY SERPL CALC.SUM OF ELEC: NORMAL MOSM/KG (ref 273–305)
OXYCODONE UR QL SCN: NEGATIVE
PCP UR QL SCN: NEGATIVE
PH UR STRIP.AUTO: 7 [PH] (ref 5–8)
PLATELET # BLD AUTO: 122 10*3/MM3 (ref 130–400)
PMV BLD AUTO: 10.6 FL (ref 6–10)
POTASSIUM BLD-SCNC: 3.9 MMOL/L (ref 3.5–5.3)
PROT SERPL-MCNC: 7.4 G/DL (ref 6–8)
PROT UR QL STRIP: NEGATIVE
RBC # BLD AUTO: 4.56 10*6/MM3 (ref 4.2–5.4)
SODIUM BLD-SCNC: 143 MMOL/L (ref 135–153)
SP GR UR STRIP: 1.02 (ref 1–1.03)
UROBILINOGEN UR QL STRIP: NORMAL
WBC NRBC COR # BLD: 5.83 10*3/MM3 (ref 4.5–12.5)

## 2017-09-09 PROCEDURE — 80307 DRUG TEST PRSMV CHEM ANLYZR: CPT | Performed by: FAMILY MEDICINE

## 2017-09-09 PROCEDURE — 36415 COLL VENOUS BLD VENIPUNCTURE: CPT

## 2017-09-09 PROCEDURE — 25010000002 PROMETHAZINE PER 50 MG: Performed by: FAMILY MEDICINE

## 2017-09-09 PROCEDURE — 25010000002 MORPHINE PER 10 MG: Performed by: FAMILY MEDICINE

## 2017-09-09 PROCEDURE — 85025 COMPLETE CBC W/AUTO DIFF WBC: CPT | Performed by: FAMILY MEDICINE

## 2017-09-09 PROCEDURE — 96372 THER/PROPH/DIAG INJ SC/IM: CPT

## 2017-09-09 PROCEDURE — 80053 COMPREHEN METABOLIC PANEL: CPT | Performed by: FAMILY MEDICINE

## 2017-09-09 PROCEDURE — 81003 URINALYSIS AUTO W/O SCOPE: CPT | Performed by: FAMILY MEDICINE

## 2017-09-09 PROCEDURE — 99283 EMERGENCY DEPT VISIT LOW MDM: CPT

## 2017-09-09 RX ORDER — HYDROCODONE BITARTRATE AND ACETAMINOPHEN 7.5; 325 MG/1; MG/1
1 TABLET ORAL ONCE
Status: COMPLETED | OUTPATIENT
Start: 2017-09-09 | End: 2017-09-09

## 2017-09-09 RX ORDER — PROMETHAZINE HYDROCHLORIDE 25 MG/ML
25 INJECTION, SOLUTION INTRAMUSCULAR; INTRAVENOUS ONCE
Status: COMPLETED | OUTPATIENT
Start: 2017-09-09 | End: 2017-09-09

## 2017-09-09 RX ADMIN — HYDROCODONE BITARTRATE AND ACETAMINOPHEN 1 TABLET: 7.5; 325 TABLET ORAL at 15:40

## 2017-09-09 RX ADMIN — PROMETHAZINE HYDROCHLORIDE 25 MG: 25 INJECTION INTRAMUSCULAR; INTRAVENOUS at 15:41

## 2017-09-09 RX ADMIN — MORPHINE SULFATE 4 MG: 4 INJECTION, SOLUTION INTRAMUSCULAR; INTRAVENOUS at 16:28

## 2017-09-09 NOTE — ED PROVIDER NOTES
Subjective   Patient is a 42 y.o. female presenting with abdominal pain.   History provided by:  Patient   used: No    Abdominal Pain   Pain location:  Generalized  Pain quality: aching and dull    Pain radiates to:  Does not radiate  Pain severity:  Moderate  Onset quality:  Sudden  Duration:  4 days  Timing:  Constant  Progression:  Unchanged  Chronicity:  Recurrent  Context: not recent illness and not suspicious food intake    Relieved by:  None tried  Worsened by:  Nothing  Ineffective treatments:  None tried  Associated symptoms: nausea and vomiting    Associated symptoms: no chest pain, no constipation, no cough, no diarrhea, no dysuria, no fever, no hematemesis, no hematochezia, no shortness of breath and no sore throat    Risk factors: not obese        Review of Systems   Constitutional: Negative for activity change and fever.   HENT: Negative for congestion and sore throat.    Eyes: Negative for pain.   Respiratory: Negative for cough, shortness of breath and wheezing.    Cardiovascular: Negative for chest pain.   Gastrointestinal: Positive for abdominal pain, nausea and vomiting. Negative for abdominal distention, constipation, diarrhea, hematemesis and hematochezia.   Genitourinary: Negative for difficulty urinating and dysuria.   Musculoskeletal: Negative for arthralgias and myalgias.   Skin: Negative for rash and wound.   Neurological: Negative for dizziness and headaches.   Psychiatric/Behavioral: Negative for agitation.   All other systems reviewed and are negative.      Past Medical History:   Diagnosis Date   • Abdominal pain    • Abdominal swelling    • Hoyos's disease    • Bipolar 1 disorder    • Brain tumor     R Frontal Lobe per pt   • Brain tumor 2014   • Brain tumor (benign)    • Constipation    • DDD (degenerative disc disease), cervical 05/29/2017   • DDD (degenerative disc disease), cervical    • Diarrhea    • Fibromyalgia    • IBS (irritable bowel syndrome)    •  Migraine    • Nausea & vomiting    • PONV (postoperative nausea and vomiting)    • PTSD (post-traumatic stress disorder)    • Rectal bleeding        Allergies   Allergen Reactions   • Ativan [Lorazepam] Hallucinations     confusion   • Sulfa Antibiotics Shortness Of Breath and Swelling   • Compazine [Prochlorperazine Edisylate] Hives   • Demerol [Meperidine] Hives   • Droperidol Itching   • Metoclopramide Swelling   • Toradol [Ketorolac Tromethamine] Hives and Itching   • Zofran [Ondansetron Hcl] Rash       Past Surgical History:   Procedure Laterality Date   • ANAL SCOPE N/A 7/28/2016    Procedure: ANAL SCOPE;  Surgeon: Kael Lopez MD;  Location: [facility] OR;  Service:    • APPENDECTOMY     • COLONOSCOPY N/A 6/30/2016    Procedure: COLONOSCOPY  CPTCODE:69160;  Surgeon: Jose Antonio Belle III, MD;  Location: [facility] OR;  Service:    • COLONOSCOPY N/A 7/7/2016    Procedure: COLONOSCOPY (51579) CPT;  Surgeon: Jose Antonio Belle III, MD;  Location: [facility] OR;  Service:    • CYSTOSCOPY RETROGRADE PYELOGRAM Bilateral 4/28/2017    Procedure: CYSTOSCOPY RETROGRADE PYELOGRAM;  Surgeon: Mu Blunt MD;  Location: [facility] OR;  Service:    • ENDOSCOPY N/A 6/30/2016    Procedure: ESOPHAGOGASTRODUODENOSCOPY WITH BIOPSY  CPTCODE:[id number];  Surgeon: Jose Antonio Belle III, MD;  Location: [facility] OR;  Service:    • HEMORRHOIDECTOMY N/A 7/28/2016    Procedure: HEMORRHOID STAPLING;  Surgeon: Kael Lopez MD;  Location: [facility] OR;  Service:    • HYSTERECTOMY     • KNEE SURGERY     • LAPAROSCOPIC SALPINGOOPHERECTOMY     • PORTACATH PLACEMENT N/A 7/28/2017    Procedure: INSERTION OF PORTACATH;  Surgeon: Celso Arredondo MD;  Location: [facility] OR;  Service:    • SHOULDER SURGERY      3 times       Family History   Problem Relation Age of Onset   • Crohn's disease Other    • Hypertension Other    • Diabetes Other    • Irritable bowel syndrome Other        Social History     Social History   • Marital status:       Spouse name: N/A   • Number of children: N/A   • Years of education: N/A     Social History Main Topics   • Smoking status: Former Smoker     Packs/day: 1.00     Years: 20.00     Quit date: 11/1/2015   • Smokeless tobacco: Never Used   • Alcohol use No   • Drug use: Yes     Special: Marijuana   • Sexual activity: Defer     Other Topics Concern   • None     Social History Narrative           Objective   Physical Exam   Constitutional: She is oriented to person, place, and time. She appears well-developed and well-nourished.   HENT:   Head: Normocephalic and atraumatic.   Eyes: EOM are normal. Pupils are equal, round, and reactive to light.   Neck: Normal range of motion. Neck supple.   Cardiovascular: Normal rate, regular rhythm and normal heart sounds.    Pulmonary/Chest: Effort normal and breath sounds normal.   Abdominal: Soft. Bowel sounds are normal. There is generalized tenderness.   Musculoskeletal: Normal range of motion.   Neurological: She is alert and oriented to person, place, and time.   Skin: Skin is warm and dry.   Psychiatric: She has a normal mood and affect. Her behavior is normal. Judgment and thought content normal.   Nursing note and vitals reviewed.      Procedures         ED Course  ED Course                  MDM  Number of Diagnoses or Management Options  Abdominal pain, chronic, right upper quadrant:      Amount and/or Complexity of Data Reviewed  Clinical lab tests: ordered and reviewed  Tests in the radiology section of CPT®: reviewed and ordered  Tests in the medicine section of CPT®: reviewed and ordered    Patient Progress  Patient progress: stable      Final diagnoses:   Abdominal pain, chronic, right upper quadrant            TAMI Vicente  09/09/17 0046

## 2017-09-11 ENCOUNTER — OFFICE VISIT (OUTPATIENT)
Dept: UROLOGY | Facility: CLINIC | Age: 42
End: 2017-09-11

## 2017-09-11 ENCOUNTER — OFFICE VISIT (OUTPATIENT)
Dept: GASTROENTEROLOGY | Facility: CLINIC | Age: 42
End: 2017-09-11

## 2017-09-11 VITALS
HEIGHT: 69 IN | OXYGEN SATURATION: 98 % | DIASTOLIC BLOOD PRESSURE: 78 MMHG | SYSTOLIC BLOOD PRESSURE: 110 MMHG | HEART RATE: 95 BPM | BODY MASS INDEX: 23.93 KG/M2 | WEIGHT: 161.6 LBS

## 2017-09-11 VITALS
HEIGHT: 69 IN | BODY MASS INDEX: 24.88 KG/M2 | DIASTOLIC BLOOD PRESSURE: 81 MMHG | HEART RATE: 99 BPM | WEIGHT: 168 LBS | SYSTOLIC BLOOD PRESSURE: 118 MMHG

## 2017-09-11 DIAGNOSIS — K76.0 FATTY LIVER: ICD-10-CM

## 2017-09-11 DIAGNOSIS — R10.11 RIGHT UPPER QUADRANT ABDOMINAL PAIN: ICD-10-CM

## 2017-09-11 DIAGNOSIS — R11.2 NAUSEA AND VOMITING, INTRACTABILITY OF VOMITING NOT SPECIFIED, UNSPECIFIED VOMITING TYPE: Primary | ICD-10-CM

## 2017-09-11 DIAGNOSIS — IMO0002 URETHRAL STENOSIS: ICD-10-CM

## 2017-09-11 DIAGNOSIS — R10.84 GENERALIZED ABDOMINAL PAIN: ICD-10-CM

## 2017-09-11 DIAGNOSIS — R31.0 GROSS HEMATURIA: ICD-10-CM

## 2017-09-11 DIAGNOSIS — K92.1 HEMATOCHEZIA: ICD-10-CM

## 2017-09-11 DIAGNOSIS — N99.12 POSTPROCEDURAL FEMALE URETHRAL STRICTURE: Primary | ICD-10-CM

## 2017-09-11 PROCEDURE — 99244 OFF/OP CNSLTJ NEW/EST MOD 40: CPT | Performed by: PHYSICIAN ASSISTANT

## 2017-09-11 PROCEDURE — 96372 THER/PROPH/DIAG INJ SC/IM: CPT | Performed by: UROLOGY

## 2017-09-11 PROCEDURE — 53661 DILATION OF URETHRA: CPT | Performed by: UROLOGY

## 2017-09-11 RX ORDER — OXYCODONE HYDROCHLORIDE AND ACETAMINOPHEN 5; 325 MG/1; MG/1
TABLET ORAL
Qty: 20 TABLET | Refills: 0 | Status: SHIPPED | OUTPATIENT
Start: 2017-09-11 | End: 2018-01-09 | Stop reason: SDUPTHER

## 2017-09-11 RX ORDER — GENTAMICIN SULFATE 40 MG/ML
80 INJECTION, SOLUTION INTRAMUSCULAR; INTRAVENOUS ONCE
Status: COMPLETED | OUTPATIENT
Start: 2017-09-11 | End: 2017-09-11

## 2017-09-11 RX ADMIN — GENTAMICIN SULFATE 80 MG: 40 INJECTION, SOLUTION INTRAMUSCULAR; INTRAVENOUS at 15:31

## 2017-09-11 NOTE — PROGRESS NOTES
Chief Complaint:          Chief Complaint   Patient presents with   • Urethral Stricture     2 month f/u       HPI:   42 y.o. female.  42-year-old white female with a tight urethral stricture, and known IgA nephropathy.  She's going to the nephrologist the near future she's having intermittent gross hematuria consistent with the disease.  She has a severe stricture she now presents for dilatation.        Past Medical History:        Past Medical History:   Diagnosis Date   • Abdominal pain    • Abdominal swelling    • Hoyos's disease    • Bipolar 1 disorder    • Brain tumor     R Frontal Lobe per pt   • Brain tumor 2014   • Brain tumor (benign)    • Constipation    • DDD (degenerative disc disease), cervical 05/29/2017   • DDD (degenerative disc disease), cervical    • Diarrhea    • Fibromyalgia    • IBS (irritable bowel syndrome)    • Migraine    • Nausea & vomiting    • PONV (postoperative nausea and vomiting)    • PTSD (post-traumatic stress disorder)    • Rectal bleeding          Current Meds:     Current Outpatient Prescriptions   Medication Sig Dispense Refill   • azelastine (ASTEPRO) 0.15 % solution nasal spray 2 sprays into each nostril 2 (Two) Times a Day. 30 mL 0   • benzonatate (TESSALON) 200 MG capsule Take 1 capsule by mouth 3 (Three) Times a Day As Needed for Cough. 30 capsule 0   • busPIRone (BUSPAR) 15 MG tablet Take 15 mg by mouth 3 (three) times a day        • butorphanol (STADOL) 10 MG/ML nasal spray 1 spray into each nostril Daily.     • carbidopa-levodopa (SINEMET)  MG per tablet Take 1 tablet by mouth 3 (Three) Times a Day.     • cetirizine (zyrTEC) 10 MG tablet Take 1 tablet by mouth Daily. 30 tablet 0   • divalproex (DEPAKOTE) 500 MG DR tablet Take 500 mg by mouth 2 (Two) Times a Day.     • famotidine (PEPCID) 40 MG tablet Take 40 mg by mouth 2 (Two) Times a Day As Needed for heartburn.     • guaiFENesin (MUCINEX) 600 MG 12 hr tablet Take 2 tablets by mouth 2 (Two) Times a Day. 20  tablet 0   • HYDROcodone-acetaminophen (NORCO) 7.5-325 MG per tablet Take 1 tablet by mouth Every 6 (Six) Hours As Needed for Moderate Pain (4-6).     • oxybutynin (DITROPAN) 5 MG tablet Take 1 tablet by mouth 3 (Three) Times a Day. 90 tablet 11   • pantoprazole (PROTONIX) 40 MG EC tablet Take 40 mg by mouth Daily As Needed.     • potassium chloride (K-DUR) 10 MEQ CR tablet Take 1 tablet by mouth Daily. 12 tablet 0   • promethazine (PHENERGAN) 25 MG tablet Take 1 tablet by mouth Every 6 (Six) Hours As Needed for Nausea or Vomiting. 30 tablet 0   • sertraline (ZOLOFT) 100 MG tablet Take 150 mg by mouth daily.     • SUMAtriptan (IMITREX) 6 MG/0.5ML solution injection Inject 6 mg under the skin 1 (One) Time.       No current facility-administered medications for this visit.         Allergies:      Allergies   Allergen Reactions   • Ativan [Lorazepam] Hallucinations     confusion   • Sulfa Antibiotics Shortness Of Breath and Swelling   • Compazine [Prochlorperazine Edisylate] Hives   • Demerol [Meperidine] Hives   • Droperidol Itching   • Metoclopramide Swelling   • Toradol [Ketorolac Tromethamine] Hives and Itching   • Zofran [Ondansetron Hcl] Rash        Past Surgical History:     Past Surgical History:   Procedure Laterality Date   • ANAL SCOPE N/A 7/28/2016    Procedure: ANAL SCOPE;  Surgeon: Kael Lopez MD;  Location: [facility] OR;  Service:    • APPENDECTOMY     • COLONOSCOPY N/A 6/30/2016    Procedure: COLONOSCOPY  CPTCODE:20247;  Surgeon: Jose Antonio Belle III, MD;  Location: [facility] OR;  Service:    • COLONOSCOPY N/A 7/7/2016    Procedure: COLONOSCOPY (01871) CPT;  Surgeon: Jose Antonio Belle III, MD;  Location: [facility] OR;  Service:    • CYSTOSCOPY RETROGRADE PYELOGRAM Bilateral 4/28/2017    Procedure: CYSTOSCOPY RETROGRADE PYELOGRAM;  Surgeon: Mu Blunt MD;  Location: [facility] OR;  Service:    • ENDOSCOPY N/A 6/30/2016    Procedure: ESOPHAGOGASTRODUODENOSCOPY WITH BIOPSY  CPTCODE:75746;  Surgeon:  Jose Antonio Belle III, MD;  Location: [facility] OR;  Service:    • HEMORRHOIDECTOMY N/A 7/28/2016    Procedure: HEMORRHOID STAPLING;  Surgeon: Kael Lopez MD;  Location: [facility] OR;  Service:    • HYSTERECTOMY     • KNEE SURGERY     • LAPAROSCOPIC SALPINGOOPHERECTOMY     • PORTACATH PLACEMENT N/A 7/28/2017    Procedure: INSERTION OF PORTACATH;  Surgeon: Celso Arredondo MD;  Location: [facility] OR;  Service:    • SHOULDER SURGERY      3 times         Social History:     Social History     Social History   • Marital status:      Spouse name: N/A   • Number of children: N/A   • Years of education: N/A     Occupational History   • Not on file.     Social History Main Topics   • Smoking status: Former Smoker     Packs/day: 1.00     Years: 20.00     Quit date: 11/1/2015   • Smokeless tobacco: Never Used   • Alcohol use No   • Drug use: Yes     Special: Marijuana   • Sexual activity: Defer     Other Topics Concern   • Not on file     Social History Narrative       Family History:     Family History   Problem Relation Age of Onset   • Crohn's disease Other    • Hypertension Other    • Diabetes Other    • Irritable bowel syndrome Other        Review of Systems:     Review of Systems    Physical Exam:   after an appropriate informed consent the patient was brought to the procedure suite.  The urethra was gently anesthetized with 20 cc of 2% viscous Xylocaine jelly.  After an adequate period of topical anesthesia I went ahead  the urethra was gently anesthetized and dilated with McPherson sounds from 16-30 Congolese sequentially without complication the patient was given gentamicin as prophylaxis with 80 mg  Physical Exam    Procedure:       Assessment:   No diagnosis found.  No orders of the defined types were placed in this encounter.      Plan:   Severe urethral stricture disease-for intermittent dilatation.every 2 months.  IgA nephropathy to follow with nephrology           This document has been electronically  signed by VERENICE FINK MD September 11, 2017 2:24 PM

## 2017-09-11 NOTE — PROGRESS NOTES
Chief Complaint   Patient presents with   • Abdominal Pain   • Black or Bloody Stool       Susanna Camilo is a 42 y.o. female who presents to the office today for evaluation of Abdominal Pain and Black or Bloody Stool  .    HPI    The patient was seen for a GI evaluation due to abdominal pain and blood in stools.  She reports nausea, vomiting, abdominal pain that is generalized at times and in RUQ at other times.  She denies constipation and diarrhea but reports hematochezia that started 4 days ago with blood clots that developed last night.  She had an EGD/colonosocpy last July which revealed gastritis and internal hemorrhoids.  Last year she had a stapling of her hemorrhoids.  Patient was also concerned because she was recently told that her hepatitis panel in 2014 was reactive for Hepatitis C.  She denies IV drug use but does report multiple tatoos.  Liver enzymes were mildly elevated on recent lab work.  Patient Medical, surgical, social, and family histories were reviewed and are listed below.       Review of Systems   Constitutional: Positive for activity change, appetite change, fatigue and unexpected weight change.   Eyes: Negative.    Respiratory: Negative.    Cardiovascular: Negative.    Gastrointestinal: Positive for abdominal distention, abdominal pain, anal bleeding, blood in stool, diarrhea, nausea, rectal pain and vomiting. Negative for constipation.   Endocrine: Negative.    Genitourinary: Positive for difficulty urinating.   Musculoskeletal: Positive for myalgias and neck pain.   Skin: Negative.    Allergic/Immunologic: Negative.    Neurological: Positive for dizziness, tremors, light-headedness and headaches.   Hematological: Bruises/bleeds easily.   Psychiatric/Behavioral: Positive for sleep disturbance. The patient is nervous/anxious.        ACTIVE PROBLEMS:   Specialty Problems     None          PAST MEDICAL HISTORY:  Past Medical History:   Diagnosis Date   • Abdominal pain    • Abdominal swelling     • Hoyos's disease    • Bipolar 1 disorder    • Brain tumor     R Frontal Lobe per pt   • Brain tumor 2014   • Brain tumor (benign)    • Constipation    • DDD (degenerative disc disease), cervical 05/29/2017   • DDD (degenerative disc disease), cervical    • Diarrhea    • Fibromyalgia    • IBS (irritable bowel syndrome)    • Migraine    • Nausea & vomiting    • PONV (postoperative nausea and vomiting)    • PTSD (post-traumatic stress disorder)    • Rectal bleeding        SURGICAL HISTORY:  Past Surgical History:   Procedure Laterality Date   • ANAL SCOPE N/A 7/28/2016    Procedure: ANAL SCOPE;  Surgeon: Kael Lopez MD;  Location: [facility] OR;  Service:    • APPENDECTOMY     • COLONOSCOPY N/A 6/30/2016    Procedure: COLONOSCOPY  CPTCODE:47837;  Surgeon: Jose Antonio Belle III, MD;  Location: [facility] OR;  Service:    • COLONOSCOPY N/A 7/7/2016    Procedure: COLONOSCOPY (51632) CPT;  Surgeon: Jose Antonio Belle III, MD;  Location: [facility] OR;  Service:    • CYSTOSCOPY RETROGRADE PYELOGRAM Bilateral 4/28/2017    Procedure: CYSTOSCOPY RETROGRADE PYELOGRAM;  Surgeon: Mu Blunt MD;  Location: [facility] OR;  Service:    • ENDOSCOPY N/A 6/30/2016    Procedure: ESOPHAGOGASTRODUODENOSCOPY WITH BIOPSY  CPTCODE:70870;  Surgeon: Jose Antonio Belle III, MD;  Location: [facility] OR;  Service:    • HEMORRHOIDECTOMY N/A 7/28/2016    Procedure: HEMORRHOID STAPLING;  Surgeon: Kael Lopez MD;  Location: [facility] OR;  Service:    • HYSTERECTOMY     • KNEE SURGERY     • LAPAROSCOPIC SALPINGOOPHERECTOMY     • PORTACATH PLACEMENT N/A 7/28/2017    Procedure: INSERTION OF PORTACATH;  Surgeon: Celso Arredondo MD;  Location: [facility] OR;  Service:    • SHOULDER SURGERY      3 times       FAMILY HISTORY:  Family History   Problem Relation Age of Onset   • Crohn's disease Other    • Hypertension Other    • Diabetes Other    • Irritable bowel syndrome Other        SOCIAL HISTORY:  Social History   Substance Use Topics   • Smoking  status: Former Smoker     Packs/day: 1.00     Years: 20.00     Quit date: 11/1/2015   • Smokeless tobacco: Never Used   • Alcohol use No       CURRENT MEDICATION:    Current Outpatient Prescriptions:   •  azelastine (ASTEPRO) 0.15 % solution nasal spray, 2 sprays into each nostril 2 (Two) Times a Day., Disp: 30 mL, Rfl: 0  •  benzonatate (TESSALON) 200 MG capsule, Take 1 capsule by mouth 3 (Three) Times a Day As Needed for Cough., Disp: 30 capsule, Rfl: 0  •  busPIRone (BUSPAR) 15 MG tablet, Take 15 mg by mouth 3 (three) times a day  , Disp: , Rfl:   •  butorphanol (STADOL) 10 MG/ML nasal spray, 1 spray into each nostril Daily., Disp: , Rfl:   •  carbidopa-levodopa (SINEMET)  MG per tablet, Take 1 tablet by mouth 3 (Three) Times a Day., Disp: , Rfl:   •  cetirizine (zyrTEC) 10 MG tablet, Take 1 tablet by mouth Daily., Disp: 30 tablet, Rfl: 0  •  divalproex (DEPAKOTE) 500 MG DR tablet, Take 500 mg by mouth 2 (Two) Times a Day., Disp: , Rfl:   •  famotidine (PEPCID) 40 MG tablet, Take 40 mg by mouth 2 (Two) Times a Day As Needed for heartburn., Disp: , Rfl:   •  HYDROcodone-acetaminophen (NORCO) 7.5-325 MG per tablet, Take 1 tablet by mouth Every 6 (Six) Hours As Needed for Moderate Pain (4-6)., Disp: , Rfl:   •  oxybutynin (DITROPAN) 5 MG tablet, Take 1 tablet by mouth 3 (Three) Times a Day., Disp: 90 tablet, Rfl: 11  •  pantoprazole (PROTONIX) 40 MG EC tablet, Take 40 mg by mouth Daily As Needed., Disp: , Rfl:   •  potassium chloride (K-DUR) 10 MEQ CR tablet, Take 1 tablet by mouth Daily., Disp: 12 tablet, Rfl: 0  •  promethazine (PHENERGAN) 25 MG tablet, Take 1 tablet by mouth Every 6 (Six) Hours As Needed for Nausea or Vomiting., Disp: 30 tablet, Rfl: 0  •  sertraline (ZOLOFT) 100 MG tablet, Take 150 mg by mouth daily., Disp: , Rfl:   •  SUMAtriptan (IMITREX) 6 MG/0.5ML solution injection, Inject 6 mg under the skin 1 (One) Time., Disp: , Rfl:   •  guaiFENesin (MUCINEX) 600 MG 12 hr tablet, Take 2 tablets by  "mouth 2 (Two) Times a Day., Disp: 20 tablet, Rfl: 0  •  oxyCODONE-acetaminophen (PERCOCET) 5-325 MG per tablet, 1 to 2 Tablets Every 6 Hours as needed for PAIN, Disp: 20 tablet, Rfl: 0  •  polyethylene glycol (GoLYTELY) 236 g solution, Starting at 6 pm on day prior to procedure, drink 8 ounces every 15 minutes until all gone or stools are clear. May add flavor packet., Disp: 4000 mL, Rfl: 0  No current facility-administered medications for this visit.     ALLERGIES:  Ativan [lorazepam]; Sulfa antibiotics; Compazine [prochlorperazine edisylate]; Demerol [meperidine]; Droperidol; Metoclopramide; Toradol [ketorolac tromethamine]; and Zofran [ondansetron hcl]    VISIT VITALS:  /78  Pulse 95  Ht 69\" (175.3 cm)  Wt 161 lb 9.6 oz (73.3 kg)  LMP  (LMP Unknown)  SpO2 98%  BMI 23.86 kg/m2  Physical Exam   Constitutional: She is oriented to person, place, and time. She appears well-developed and well-nourished. No distress.   HENT:   Head: Normocephalic and atraumatic.   Right Ear: External ear normal.   Left Ear: External ear normal.   Nose: Nose normal.   Mouth/Throat: Oropharynx is clear and moist. No oropharyngeal exudate.   Eyes: Conjunctivae and EOM are normal. Pupils are equal, round, and reactive to light. Right eye exhibits no discharge. Left eye exhibits no discharge. No scleral icterus.   Neck: Normal range of motion. Neck supple. No JVD present. No tracheal deviation present. No thyromegaly present.   Cardiovascular: Normal rate, regular rhythm, normal heart sounds and intact distal pulses.  Exam reveals no gallop and no friction rub.    No murmur heard.  Pulmonary/Chest: Effort normal and breath sounds normal. No stridor. No respiratory distress. She has no wheezes. She has no rales. She exhibits no tenderness.   Abdominal: Soft. Bowel sounds are normal. She exhibits no distension and no mass. There is tenderness (generalized). There is no rebound and no guarding. No hernia.   Musculoskeletal: She " exhibits no edema, tenderness or deformity.   Lymphadenopathy:     She has no cervical adenopathy.   Neurological: She is alert and oriented to person, place, and time. She has normal reflexes. She displays normal reflexes. No cranial nerve deficit. She exhibits normal muscle tone. Coordination normal.   Skin: Skin is warm and dry. No rash noted. She is not diaphoretic. No erythema. No pallor.   Psychiatric: She has a normal mood and affect. Her behavior is normal. Judgment and thought content normal.       Assessment/Plan      Diagnosis Plan   1. Nausea and vomiting, intractability of vomiting not specified, unspecified vomiting type  Case Request   2. Generalized abdominal pain  Case Request   3. Hematochezia  Case Request   4. Fatty liver  Hepatitis C RNA, Quantitative, PCR (graph)   5. Right upper quadrant abdominal pain  Hepatitis C RNA, Quantitative, PCR (graph)     An EGD/colonoscopy will be scheduled due to hematochezia, nausea, vomiting, and abdominal pain.  She will also have a Hepatitis C RNA blood test to confirm Hepatitis C.  Patient voiced understanding and agreement of recommendations.  Return in about 8 weeks (around 11/6/2017) for Recheck.         TAMI Breen

## 2017-09-15 ENCOUNTER — HOSPITAL ENCOUNTER (EMERGENCY)
Facility: HOSPITAL | Age: 42
Discharge: HOME | End: 2017-09-15
Attending: EMERGENCY MEDICINE | Admitting: EMERGENCY MEDICINE

## 2017-09-15 VITALS
HEART RATE: 75 BPM | WEIGHT: 161 LBS | OXYGEN SATURATION: 98 % | BODY MASS INDEX: 23.85 KG/M2 | TEMPERATURE: 98 F | SYSTOLIC BLOOD PRESSURE: 127 MMHG | DIASTOLIC BLOOD PRESSURE: 84 MMHG | RESPIRATION RATE: 16 BRPM | HEIGHT: 69 IN

## 2017-09-15 DIAGNOSIS — R31.9 HEMATURIA: Primary | ICD-10-CM

## 2017-09-15 DIAGNOSIS — R10.9 RIGHT FLANK PAIN: ICD-10-CM

## 2017-09-15 LAB
6-ACETYL MORPHINE: NEGATIVE
ALBUMIN SERPL-MCNC: 4.3 G/DL (ref 3.5–5)
ALBUMIN/GLOB SERPL: 1.5 G/DL (ref 1.5–2.5)
ALP SERPL-CCNC: 66 U/L (ref 35–104)
ALT SERPL W P-5'-P-CCNC: 60 U/L (ref 10–36)
AMPHET+METHAMPHET UR QL: NEGATIVE
ANION GAP SERPL CALCULATED.3IONS-SCNC: 10.9 MMOL/L (ref 3.6–11.2)
AST SERPL-CCNC: 55 U/L (ref 10–30)
BACTERIA UR QL AUTO: ABNORMAL /HPF
BARBITURATES UR QL SCN: POSITIVE
BASOPHILS # BLD AUTO: 0.01 10*3/MM3 (ref 0–0.3)
BASOPHILS NFR BLD AUTO: 0.2 % (ref 0–2)
BENZODIAZ UR QL SCN: NEGATIVE
BILIRUB SERPL-MCNC: 0.4 MG/DL (ref 0.2–1.8)
BILIRUB UR QL STRIP: NEGATIVE
BUN BLD-MCNC: 9 MG/DL (ref 7–21)
BUN/CREAT SERPL: 17 (ref 7–25)
BUPRENORPHINE SERPL-MCNC: NEGATIVE NG/ML
CALCIUM SPEC-SCNC: 9.5 MG/DL (ref 7.7–10)
CANNABINOIDS SERPL QL: POSITIVE
CHLORIDE SERPL-SCNC: 105 MMOL/L (ref 99–112)
CLARITY UR: ABNORMAL
CO2 SERPL-SCNC: 25.1 MMOL/L (ref 24.3–31.9)
COCAINE UR QL: NEGATIVE
COLOR UR: ABNORMAL
CREAT BLD-MCNC: 0.53 MG/DL (ref 0.43–1.29)
DEPRECATED RDW RBC AUTO: 40.9 FL (ref 37–54)
EOSINOPHIL # BLD AUTO: 0.14 10*3/MM3 (ref 0–0.7)
EOSINOPHIL NFR BLD AUTO: 2.4 % (ref 0–5)
ERYTHROCYTE [DISTWIDTH] IN BLOOD BY AUTOMATED COUNT: 12.6 % (ref 11.5–14.5)
GFR SERPL CREATININE-BSD FRML MDRD: 127 ML/MIN/1.73
GLOBULIN UR ELPH-MCNC: 2.8 GM/DL
GLUCOSE BLD-MCNC: 100 MG/DL (ref 70–110)
GLUCOSE UR STRIP-MCNC: NEGATIVE MG/DL
HCT VFR BLD AUTO: 40.4 % (ref 37–47)
HGB BLD-MCNC: 13.9 G/DL (ref 12–16)
HGB UR QL STRIP.AUTO: ABNORMAL
HYALINE CASTS UR QL AUTO: ABNORMAL /LPF
IMM GRANULOCYTES # BLD: 0.01 10*3/MM3 (ref 0–0.03)
IMM GRANULOCYTES NFR BLD: 0.2 % (ref 0–0.5)
KETONES UR QL STRIP: ABNORMAL
LEUKOCYTE ESTERASE UR QL STRIP.AUTO: ABNORMAL
LYMPHOCYTES # BLD AUTO: 2.46 10*3/MM3 (ref 1–3)
LYMPHOCYTES NFR BLD AUTO: 42.1 % (ref 21–51)
MCH RBC QN AUTO: 30.9 PG (ref 27–33)
MCHC RBC AUTO-ENTMCNC: 34.4 G/DL (ref 33–37)
MCV RBC AUTO: 89.8 FL (ref 80–94)
METHADONE UR QL SCN: NEGATIVE
MONOCYTES # BLD AUTO: 0.46 10*3/MM3 (ref 0.1–0.9)
MONOCYTES NFR BLD AUTO: 7.9 % (ref 0–10)
NEUTROPHILS # BLD AUTO: 2.76 10*3/MM3 (ref 1.4–6.5)
NEUTROPHILS NFR BLD AUTO: 47.2 % (ref 30–70)
NITRITE UR QL STRIP: NEGATIVE
OPIATES UR QL: NEGATIVE
OSMOLALITY SERPL CALC.SUM OF ELEC: 280 MOSM/KG (ref 273–305)
OXYCODONE UR QL SCN: NEGATIVE
PCP UR QL SCN: NEGATIVE
PH UR STRIP.AUTO: 7.5 [PH] (ref 5–8)
PLATELET # BLD AUTO: 151 10*3/MM3 (ref 130–400)
PMV BLD AUTO: 10.5 FL (ref 6–10)
POTASSIUM BLD-SCNC: 3.8 MMOL/L (ref 3.5–5.3)
PROT SERPL-MCNC: 7.1 G/DL (ref 6–8)
PROT UR QL STRIP: NEGATIVE
RBC # BLD AUTO: 4.5 10*6/MM3 (ref 4.2–5.4)
RBC # UR: ABNORMAL /HPF
REF LAB TEST METHOD: ABNORMAL
SODIUM BLD-SCNC: 141 MMOL/L (ref 135–153)
SP GR UR STRIP: 1.02 (ref 1–1.03)
SQUAMOUS #/AREA URNS HPF: ABNORMAL /HPF
UROBILINOGEN UR QL STRIP: ABNORMAL
WBC NRBC COR # BLD: 5.84 10*3/MM3 (ref 4.5–12.5)
WBC UR QL AUTO: ABNORMAL /HPF

## 2017-09-15 PROCEDURE — 25010000002 HYDROMORPHONE PER 4 MG: Performed by: EMERGENCY MEDICINE

## 2017-09-15 PROCEDURE — 80307 DRUG TEST PRSMV CHEM ANLYZR: CPT | Performed by: PHYSICIAN ASSISTANT

## 2017-09-15 PROCEDURE — 80053 COMPREHEN METABOLIC PANEL: CPT | Performed by: PHYSICIAN ASSISTANT

## 2017-09-15 PROCEDURE — 96372 THER/PROPH/DIAG INJ SC/IM: CPT

## 2017-09-15 PROCEDURE — 81001 URINALYSIS AUTO W/SCOPE: CPT | Performed by: PHYSICIAN ASSISTANT

## 2017-09-15 PROCEDURE — 99283 EMERGENCY DEPT VISIT LOW MDM: CPT

## 2017-09-15 PROCEDURE — 25010000002 PROMETHAZINE PER 50 MG: Performed by: EMERGENCY MEDICINE

## 2017-09-15 PROCEDURE — 85025 COMPLETE CBC W/AUTO DIFF WBC: CPT | Performed by: PHYSICIAN ASSISTANT

## 2017-09-15 RX ORDER — PROMETHAZINE HYDROCHLORIDE 25 MG/ML
25 INJECTION, SOLUTION INTRAMUSCULAR; INTRAVENOUS ONCE
Status: COMPLETED | OUTPATIENT
Start: 2017-09-15 | End: 2017-09-15

## 2017-09-15 RX ORDER — HYDROMORPHONE HCL 110MG/55ML
1.5 PATIENT CONTROLLED ANALGESIA SYRINGE INTRAVENOUS ONCE
Status: COMPLETED | OUTPATIENT
Start: 2017-09-15 | End: 2017-09-15

## 2017-09-15 RX ADMIN — HYDROMORPHONE HYDROCHLORIDE 1.5 MG: 2 INJECTION, SOLUTION INTRAMUSCULAR; INTRAVENOUS; SUBCUTANEOUS at 20:06

## 2017-09-15 RX ADMIN — PROMETHAZINE HYDROCHLORIDE 25 MG: 25 INJECTION INTRAMUSCULAR; INTRAVENOUS at 20:05

## 2017-09-16 NOTE — ED PROVIDER NOTES
Subjective   HPI Comments: 42-year-old female comes in with chief complaint dysuria, flank pain times one day.  Patient states that several days ago had a urethral dilation performed by Dr. Mcintyre urology.  Patient states that she has had increased hematuria, dysuria and right flank pain. Describes pain as sharp stabbing.     Patient is a 42 y.o. female presenting with flank pain.   History provided by:  Patient   used: No    Flank Pain   Pain location:  R flank  Pain quality: sharp and stabbing    Pain radiates to:  Does not radiate  Pain severity:  Moderate  Onset quality:  Sudden  Duration:  2 days  Timing:  Constant  Progression:  Worsening  Chronicity:  New  Context: not alcohol use, not awakening from sleep, not diet changes, not eating, not medication withdrawal, not previous surgeries, not recent illness, not recent sexual activity, not retching and not sick contacts    Relieved by:  Nothing  Worsened by:  Nothing  Ineffective treatments:  None tried  Associated symptoms: nausea    Associated symptoms: no anorexia, no belching, no chest pain, no fatigue, no fever, no hematemesis and no hematochezia    Risk factors: no alcohol abuse, no aspirin use, has not had multiple surgeries, no NSAID use and not pregnant        Review of Systems   Constitutional: Negative for fatigue and fever.   Cardiovascular: Negative for chest pain.   Gastrointestinal: Positive for nausea. Negative for anorexia, hematemesis and hematochezia.   Genitourinary: Positive for flank pain.   All other systems reviewed and are negative.      Past Medical History:   Diagnosis Date   • Abdominal pain    • Abdominal swelling    • Hoyos's disease    • Bipolar 1 disorder    • Brain tumor     R Frontal Lobe per pt   • Brain tumor 2014   • Brain tumor (benign)    • Constipation    • DDD (degenerative disc disease), cervical 05/29/2017   • DDD (degenerative disc disease), cervical    • Diarrhea    • Fibromyalgia    • IBS  (irritable bowel syndrome)    • Migraine    • Nausea & vomiting    • PONV (postoperative nausea and vomiting)    • PTSD (post-traumatic stress disorder)    • Rectal bleeding        Allergies   Allergen Reactions   • Ativan [Lorazepam] Hallucinations     confusion   • Sulfa Antibiotics Shortness Of Breath and Swelling   • Compazine [Prochlorperazine Edisylate] Hives   • Demerol [Meperidine] Hives   • Droperidol Itching   • Metoclopramide Swelling   • Toradol [Ketorolac Tromethamine] Hives and Itching   • Zofran [Ondansetron Hcl] Rash       Past Surgical History:   Procedure Laterality Date   • ANAL SCOPE N/A 7/28/2016    Procedure: ANAL SCOPE;  Surgeon: Kael Lopez MD;  Location: [facility] OR;  Service:    • APPENDECTOMY     • COLONOSCOPY N/A 6/30/2016    Procedure: COLONOSCOPY  CPTCODE:48888;  Surgeon: Jose Antonio Belle III, MD;  Location: [facility] OR;  Service:    • COLONOSCOPY N/A 7/7/2016    Procedure: COLONOSCOPY (40349) CPT;  Surgeon: Jose Antonio Belle III, MD;  Location: [facility] OR;  Service:    • CYSTOSCOPY RETROGRADE PYELOGRAM Bilateral 4/28/2017    Procedure: CYSTOSCOPY RETROGRADE PYELOGRAM;  Surgeon: Mu Blunt MD;  Location: [facility] OR;  Service:    • ENDOSCOPY N/A 6/30/2016    Procedure: ESOPHAGOGASTRODUODENOSCOPY WITH BIOPSY  CPTCODE:[id number];  Surgeon: Jose Antonio Belle III, MD;  Location: [facility] OR;  Service:    • HEMORRHOIDECTOMY N/A 7/28/2016    Procedure: HEMORRHOID STAPLING;  Surgeon: Kael Lopez MD;  Location: [facility] OR;  Service:    • HYSTERECTOMY     • KNEE SURGERY     • LAPAROSCOPIC SALPINGOOPHERECTOMY     • PORTACATH PLACEMENT N/A 7/28/2017    Procedure: INSERTION OF PORTACATH;  Surgeon: Celso Arredondo MD;  Location: [facility] OR;  Service:    • SHOULDER SURGERY      3 times       Family History   Problem Relation Age of Onset   • Crohn's disease Other    • Hypertension Other    • Diabetes Other    • Irritable bowel syndrome Other        Social History     Social History    • Marital status:      Spouse name: N/A   • Number of children: N/A   • Years of education: N/A     Social History Main Topics   • Smoking status: Former Smoker     Packs/day: 1.00     Years: 20.00     Quit date: 11/1/2015   • Smokeless tobacco: Never Used   • Alcohol use No   • Drug use: Yes     Special: Marijuana   • Sexual activity: Defer     Other Topics Concern   • None     Social History Narrative           Objective   Physical Exam   Constitutional: She is oriented to person, place, and time. She appears well-developed and well-nourished.   HENT:   Head: Normocephalic.   Right Ear: External ear normal.   Left Ear: External ear normal.   Nose: Nose normal.   Mouth/Throat: Oropharynx is clear and moist.   Eyes: Conjunctivae and EOM are normal. Pupils are equal, round, and reactive to light.   Neck: Normal range of motion. Neck supple. No tracheal deviation present. No thyromegaly present.   Cardiovascular: Normal rate, regular rhythm, normal heart sounds and intact distal pulses.    Pulmonary/Chest: Effort normal and breath sounds normal.   Abdominal: Soft. Bowel sounds are normal.   Musculoskeletal: Normal range of motion.   Neurological: She is alert and oriented to person, place, and time. She has normal reflexes.   Skin: Skin is warm and dry.   Psychiatric: She has a normal mood and affect. Her behavior is normal. Judgment and thought content normal.   Nursing note and vitals reviewed.      Procedures         ED Course  ED Course   Comment By Time   42-year-old female came in with right flank pain and hematuria.  Patient does state she had ureter dilation several days ago by urology.  Patient pain is controlled at this time.  Patient advised to take her medication home for pain relief. Mark Stubbs PA-C 09/15 2110                  [facility]  Number of Diagnoses or Management Options  Hematuria: new and requires workup     Amount and/or Complexity of Data Reviewed  Clinical lab tests: reviewed and  ordered    Risk of Complications, Morbidity, and/or Mortality  Presenting problems: moderate  Diagnostic procedures: moderate  Management options: moderate    Patient Progress  Patient progress: stable      Final diagnoses:   Hematuria   Right flank pain            Mark Stubbs PA-C  09/15/17 [id number]

## 2017-09-18 ENCOUNTER — OFFICE VISIT (OUTPATIENT)
Dept: UROLOGY | Facility: CLINIC | Age: 42
End: 2017-09-18

## 2017-09-18 DIAGNOSIS — T83.84XD PAIN DUE TO URETERAL STENT, SUBSEQUENT ENCOUNTER: Primary | ICD-10-CM

## 2017-09-18 PROCEDURE — 99213 OFFICE O/P EST LOW 20 MIN: CPT | Performed by: NURSE PRACTITIONER

## 2017-09-18 RX ORDER — OXYCODONE AND ACETAMINOPHEN 10; 325 MG/1; MG/1
1 TABLET ORAL EVERY 6 HOURS PRN
Qty: 12 TABLET | Refills: 0 | Status: SHIPPED | OUTPATIENT
Start: 2017-09-18 | End: 2017-11-28 | Stop reason: SDUPTHER

## 2017-09-18 NOTE — PROGRESS NOTES
Chief Complaint:          Chief Complaint   Patient presents with   • Pain After Dilation       HPI:   42 y.o. female being seen in the office today for continued urethral pain status post urethral dilation by Dr. Blunt on 09/11/2017 for urethral tightness. Patient states she was seen at the [facility] ED on Friday for this issue and was given a pain shot which seemed to help some but she continues to have the discomfort. Patient further has episodes of gross hematuria that has a history of IgA nephropathy and is scheduled to see a nephrologist soon.    HPI        Past Medical History:        Past Medical History:   Diagnosis Date   • Abdominal pain    • Abdominal swelling    • Hoyos's disease    • Bipolar 1 disorder    • Brain tumor     R Frontal Lobe per pt   • Brain tumor 2014   • Brain tumor (benign)    • Constipation    • DDD (degenerative disc disease), cervical 05/29/2017   • DDD (degenerative disc disease), cervical    • Diarrhea    • Fibromyalgia    • IBS (irritable bowel syndrome)    • Migraine    • Nausea & vomiting    • PONV (postoperative nausea and vomiting)    • PTSD (post-traumatic stress disorder)    • Rectal bleeding          Current Meds:     Current Outpatient Prescriptions   Medication Sig Dispense Refill   • azelastine (ASTEPRO) 0.15 % solution nasal spray 2 sprays into each nostril 2 (Two) Times a Day. 30 mL 0   • benzonatate (TESSALON) 200 MG capsule Take 1 capsule by mouth 3 (Three) Times a Day As Needed for Cough. 30 capsule 0   • busPIRone (BUSPAR) 15 MG tablet Take 15 mg by mouth 3 (three) times a day        • butorphanol (STADOL) 10 MG/ML nasal spray 1 spray into each nostril Daily.     • carbidopa-levodopa (SINEMET)  MG per tablet Take 1 tablet by mouth 3 (Three) Times a Day.     • cetirizine (zyrTEC) 10 MG tablet Take 1 tablet by mouth Daily. 30 tablet 0   • divalproex (DEPAKOTE) 500 MG DR tablet Take 500 mg by mouth 2 (Two) Times a Day.     • famotidine (PEPCID) 40 MG  tablet Take 40 mg by mouth 2 (Two) Times a Day As Needed for heartburn.     • guaiFENesin (MUCINEX) 600 MG 12 hr tablet Take 2 tablets by mouth 2 (Two) Times a Day. 20 tablet 0   • HYDROcodone-acetaminophen (NORCO) 7.5-325 MG per tablet Take 1 tablet by mouth Every 6 (Six) Hours As Needed for Moderate Pain (4-6).     • oxybutynin (DITROPAN) 5 MG tablet Take 1 tablet by mouth 3 (Three) Times a Day. 90 tablet 11   • oxyCODONE-acetaminophen (PERCOCET) 5-325 MG per tablet 1 to 2 Tablets Every 6 Hours as needed for PAIN 20 tablet 0   • pantoprazole (PROTONIX) 40 MG EC tablet Take 40 mg by mouth Daily As Needed.     • polyethylene glycol (GoLYTELY) 236 g solution Starting at 6 pm on day prior to procedure, drink 8 ounces every 15 minutes until all gone or stools are clear. May add flavor packet. 4000 mL 0   • potassium chloride (K-DUR) 10 MEQ CR tablet Take 1 tablet by mouth Daily. 12 tablet 0   • promethazine (PHENERGAN) 25 MG tablet Take 1 tablet by mouth Every 6 (Six) Hours As Needed for Nausea or Vomiting. 30 tablet 0   • sertraline (ZOLOFT) 100 MG tablet Take 150 mg by mouth daily.     • SUMAtriptan (IMITREX) 6 MG/0.5ML solution injection Inject 6 mg under the skin 1 (One) Time.     • oxyCODONE-acetaminophen (PERCOCET)  MG per tablet Take 1 tablet by mouth Every 6 (Six) Hours As Needed for Moderate Pain . 12 tablet 0     No current facility-administered medications for this visit.         Allergies:      Allergies   Allergen Reactions   • Ativan [Lorazepam] Hallucinations     confusion   • Sulfa Antibiotics Shortness Of Breath and Swelling   • Compazine [Prochlorperazine Edisylate] Hives   • Demerol [Meperidine] Hives   • Droperidol Itching   • Metoclopramide Swelling   • Toradol [Ketorolac Tromethamine] Hives and Itching   • Zofran [Ondansetron Hcl] Rash        Past Surgical History:     Past Surgical History:   Procedure Laterality Date   • ANAL SCOPE N/A 7/28/2016    Procedure: ANAL SCOPE;  Surgeon: Kael RAMOS  MD Jessica;  Location: [facility] OR;  Service:    • APPENDECTOMY     • COLONOSCOPY N/A 6/30/2016    Procedure: COLONOSCOPY  CPTCODE:69208;  Surgeon: Jose Antonio Belle III, MD;  Location: [facility] OR;  Service:    • COLONOSCOPY N/A 7/7/2016    Procedure: COLONOSCOPY (42341) CPT;  Surgeon: Jose Antonio Belle III, MD;  Location: [facility] OR;  Service:    • CYSTOSCOPY RETROGRADE PYELOGRAM Bilateral 4/28/2017    Procedure: CYSTOSCOPY RETROGRADE PYELOGRAM;  Surgeon: Mu Blunt MD;  Location: [facility] OR;  Service:    • ENDOSCOPY N/A 6/30/2016    Procedure: ESOPHAGOGASTRODUODENOSCOPY WITH BIOPSY  CPTCODE:84538;  Surgeon: Jose Antonio Belle III, MD;  Location: [facility] OR;  Service:    • HEMORRHOIDECTOMY N/A 7/28/2016    Procedure: HEMORRHOID STAPLING;  Surgeon: Kael Lopez MD;  Location: [facility] OR;  Service:    • HYSTERECTOMY     • KNEE SURGERY     • LAPAROSCOPIC SALPINGOOPHERECTOMY     • PORTACATH PLACEMENT N/A 7/28/2017    Procedure: INSERTION OF PORTACATH;  Surgeon: Celso Arredondo MD;  Location: [facility] OR;  Service:    • SHOULDER SURGERY      3 times         Social History:     Social History     Social History   • Marital status:      Spouse name: N/A   • Number of children: N/A   • Years of education: N/A     Occupational History   • Not on file.     Social History Main Topics   • Smoking status: Former Smoker     Packs/day: 1.00     Years: 20.00     Quit date: 11/1/2015   • Smokeless tobacco: Never Used   • Alcohol use No   • Drug use: Yes     Special: Marijuana   • Sexual activity: Defer     Other Topics Concern   • Not on file     Social History Narrative       Family History:     Family History   Problem Relation Age of Onset   • Crohn's disease Other    • Hypertension Other    • Diabetes Other    • Irritable bowel syndrome Other        Review of Systems:     Review of Systems   Constitutional: Negative for chills, fatigue and fever.   Respiratory: Negative for cough, shortness of breath and  wheezing.    Cardiovascular: Negative for leg swelling.   Gastrointestinal: Negative for abdominal pain, nausea and vomiting.   Musculoskeletal: Negative for back pain and joint swelling.   Neurological: Negative for dizziness and headaches.   Psychiatric/Behavioral: Negative for confusion.       Physical Exam:     Physical Exam   Constitutional: She is oriented to person, place, and time. She appears well-developed and well-nourished. No distress.   Abdominal: Soft. Bowel sounds are normal. She exhibits no distension and no mass. There is no tenderness. There is no rebound and no guarding. No hernia.   Musculoskeletal: Normal range of motion.   Neurological: She is alert and oriented to person, place, and time.   Skin: Skin is warm and dry. No rash noted. She is not diaphoretic. No erythema. There is pallor.   Psychiatric: She has a normal mood and affect. Her behavior is normal. Judgment and thought content normal.   Nursing note and vitals reviewed.      Procedure:     No notes on file      Assessment:     Encounter Diagnosis   Name Primary?   • Pain due to ureteral stent, subsequent encounter Yes     No orders of the defined types were placed in this encounter.      Plan:   A one time prescription for Percocet 10/325 #12 with no refills will be given for pain control until patient is able to see Dr. Blunt who is currently out of the office for the next 2 weeks. She has a return office visit with Dr. Blunt for a urethral dilation scheduled in 2 months.    Romeo #    As part of the patient's treatment plan they are being prescribed a controlled substance/substances with potential for abuse. This patient has been made aware of the appropriate use of such medications, including potential risk of somnolence, limited ability to drive and/or work safely, and potential for overdose. It has been made clear these medications are for use by the patient only, without concomitant use of alcohol or other substances  unless prescribed/advised by the medical provider.    Patient has completed prescribing agreement detailing terms of controlled substances including monitoring Romeo reports. The patient is aware Romeo reports are reviewed on a regular basis and scanned into the chart.    Counseling was given to patient and family for the following topics patient and family education, impressions and risks and benefits of treatment options. and the interim medical history and current results were reviewed.  A treatment plan with follow-up was made. Total time of the encounter was 18 minutes and 18 minutes were spent discussing Pain due to ureteral stent, subsequent encounter [T83.84XD] face-to-face.       This document has been electronically signed by MIGDALIA Hebert September 18, 2017 6:25 PM

## 2017-09-19 ENCOUNTER — HOSPITAL ENCOUNTER (OUTPATIENT)
Dept: RESPIRATORY THERAPY | Facility: HOSPITAL | Age: 42
End: 2017-09-19
Attending: INTERNAL MEDICINE

## 2017-09-20 ENCOUNTER — HOSPITAL ENCOUNTER (OUTPATIENT)
Dept: INFUSION THERAPY | Facility: HOSPITAL | Age: 42
Discharge: HOME | End: 2017-09-20
Admitting: PHYSICIAN ASSISTANT

## 2017-09-20 DIAGNOSIS — K76.0 FATTY LIVER: ICD-10-CM

## 2017-09-20 PROCEDURE — 87522 HEPATITIS C REVRS TRNSCRPJ: CPT | Performed by: PHYSICIAN ASSISTANT

## 2017-09-20 PROCEDURE — 36415 COLL VENOUS BLD VENIPUNCTURE: CPT | Performed by: PHYSICIAN ASSISTANT

## 2017-09-20 PROCEDURE — 36591 DRAW BLOOD OFF VENOUS DEVICE: CPT

## 2017-09-20 RX ORDER — SODIUM CHLORIDE 0.9 % (FLUSH) 0.9 %
10 SYRINGE (ML) INJECTION EVERY 12 HOURS SCHEDULED
Status: DISCONTINUED | OUTPATIENT
Start: 2017-09-20 | End: 2017-09-22 | Stop reason: HOSPADM

## 2017-09-20 RX ORDER — SODIUM CHLORIDE 0.9 % (FLUSH) 0.9 %
10 SYRINGE (ML) INJECTION AS NEEDED
Status: DISCONTINUED | OUTPATIENT
Start: 2017-09-20 | End: 2017-09-22 | Stop reason: HOSPADM

## 2017-09-20 RX ADMIN — Medication 10 ML: at 14:29

## 2017-09-22 LAB
HCV RNA SERPL NAA+PROBE-ACNC: NORMAL IU/ML
HCV RNA SERPL NAA+PROBE-LOG IU: 6.72 LOG10 IU/ML
TEST INFORMATION: NORMAL

## 2017-09-24 ENCOUNTER — HOSPITAL ENCOUNTER (EMERGENCY)
Facility: HOSPITAL | Age: 42
Discharge: HOME | End: 2017-09-24
Attending: EMERGENCY MEDICINE | Admitting: EMERGENCY MEDICINE

## 2017-09-24 VITALS
TEMPERATURE: 98.2 F | SYSTOLIC BLOOD PRESSURE: 120 MMHG | BODY MASS INDEX: 24.14 KG/M2 | HEIGHT: 69 IN | OXYGEN SATURATION: 100 % | HEART RATE: 70 BPM | WEIGHT: 163 LBS | DIASTOLIC BLOOD PRESSURE: 79 MMHG | RESPIRATION RATE: 16 BRPM

## 2017-09-24 DIAGNOSIS — IMO0002 CHRONIC MIGRAINE: Primary | ICD-10-CM

## 2017-09-24 PROCEDURE — 25010000002 DEXAMETHASONE PER 1 MG: Performed by: NURSE PRACTITIONER

## 2017-09-24 PROCEDURE — 25010000002 PROMETHAZINE PER 50 MG: Performed by: NURSE PRACTITIONER

## 2017-09-24 PROCEDURE — 96361 HYDRATE IV INFUSION ADD-ON: CPT

## 2017-09-24 PROCEDURE — 96365 THER/PROPH/DIAG IV INF INIT: CPT

## 2017-09-24 PROCEDURE — 99283 EMERGENCY DEPT VISIT LOW MDM: CPT

## 2017-09-24 PROCEDURE — 25010000002 DIPHENHYDRAMINE PER 50 MG: Performed by: NURSE PRACTITIONER

## 2017-09-24 PROCEDURE — 25010000002 HEPARIN FLUSH (PORCINE) 100 UNIT/ML SOLUTION: Performed by: NURSE PRACTITIONER

## 2017-09-24 PROCEDURE — 96375 TX/PRO/DX INJ NEW DRUG ADDON: CPT

## 2017-09-24 RX ORDER — SUMATRIPTAN 6 MG/.5ML
6 INJECTION, SOLUTION SUBCUTANEOUS ONCE
Status: DISCONTINUED | OUTPATIENT
Start: 2017-09-24 | End: 2017-09-24 | Stop reason: HOSPADM

## 2017-09-24 RX ORDER — SODIUM CHLORIDE 0.9 % (FLUSH) 0.9 %
10 SYRINGE (ML) INJECTION AS NEEDED
Status: DISCONTINUED | OUTPATIENT
Start: 2017-09-24 | End: 2017-09-24 | Stop reason: HOSPADM

## 2017-09-24 RX ORDER — DIPHENHYDRAMINE HYDROCHLORIDE 50 MG/ML
50 INJECTION INTRAMUSCULAR; INTRAVENOUS ONCE
Status: COMPLETED | OUTPATIENT
Start: 2017-09-24 | End: 2017-09-24

## 2017-09-24 RX ADMIN — PROMETHAZINE HYDROCHLORIDE 12.5 MG: 25 INJECTION INTRAMUSCULAR; INTRAVENOUS at 16:19

## 2017-09-24 RX ADMIN — DIPHENHYDRAMINE HYDROCHLORIDE 50 MG: 50 INJECTION INTRAMUSCULAR; INTRAVENOUS at 17:38

## 2017-09-24 RX ADMIN — SODIUM CHLORIDE, PRESERVATIVE FREE 300 UNITS: 5 INJECTION INTRAVENOUS at 18:13

## 2017-09-24 RX ADMIN — SODIUM CHLORIDE 1000 ML: 9 INJECTION, SOLUTION INTRAVENOUS at 16:18

## 2017-09-24 RX ADMIN — DEXAMETHASONE SODIUM PHOSPHATE 10 MG: 4 INJECTION, SOLUTION INTRAMUSCULAR; INTRAVENOUS at 17:00

## 2017-09-24 NOTE — ED NOTES
"Phenergan dose nearly complete. Pt requests that I ask provider for a dose of Stadol \"if you want to. You don't have too if you don't want to\". Instructed pt that I still needed to give her Decadron and reassess relief after that.       Madyson Meraz RN  09/24/17 [id number]    "

## 2017-09-24 NOTE — DISCHARGE INSTRUCTIONS

## 2017-09-24 NOTE — ED NOTES
"Entered patient's room to access VAD. Pt and spouse visibly upset. Pt states that she is upset that \"that lady has labeled me an addict. She thinks that all I want is drugs\". Pt and spouse begin a lengthy story about how patient is forced to see multiple doctors because of her chronic condition. Instructed patient that the ER does not typically handle complicated chronic conditions and pain management.      Madyson Meraz RN  09/24/17 8042    "

## 2017-09-24 NOTE — ED NOTES
"Decadron infusion complete @ this time. Pt again asks \"what is next? What are we doing after this\". Provider in to see pt.      Madyson Meraz RN  09/24/17 1685    "

## 2017-09-24 NOTE — ED NOTES
"Pt requests to \"just go home\" after IVF complete because \"she (refering to provider) won't do what always works\".      Madyson Meraz RN  09/24/17 [id number]    "

## 2017-09-27 NOTE — ED PROVIDER NOTES
Subjective   Patient is a 42 y.o. female presenting with headaches.   History provided by:  Patient  Headache   Pain location:  Generalized  Quality:  Sharp and dull  Radiates to:  Eyes  Severity currently:  8/10  Severity at highest:  9/10  Onset quality:  Gradual  Timing:  Constant  Progression:  Worsening  Chronicity:  Chronic  Similar to prior headaches: yes    Relieved by:  Nothing  Worsened by:  Light  Ineffective treatments:  Prescription medications and resting in a darkened room  Associated symptoms: nausea and vomiting    Associated symptoms: no abdominal pain, no blurred vision and no fever        Review of Systems   Constitutional: Negative.  Negative for fever.   Eyes: Negative for blurred vision.   Respiratory: Negative.    Cardiovascular: Negative.  Negative for chest pain.   Gastrointestinal: Positive for nausea and vomiting. Negative for abdominal pain.   Endocrine: Negative.    Genitourinary: Negative.  Negative for dysuria.   Skin: Negative.    Neurological: Positive for headaches.   Psychiatric/Behavioral: Negative.    All other systems reviewed and are negative.      Past Medical History:   Diagnosis Date   • Abdominal pain    • Abdominal swelling    • Hoyos's disease    • Bipolar 1 disorder    • Brain tumor     R Frontal Lobe per pt   • Brain tumor 2014   • Brain tumor (benign)    • Constipation    • DDD (degenerative disc disease), cervical 05/29/2017   • DDD (degenerative disc disease), cervical    • Diarrhea    • Fibromyalgia    • IBS (irritable bowel syndrome)    • Migraine    • Nausea & vomiting    • PONV (postoperative nausea and vomiting)    • PTSD (post-traumatic stress disorder)    • Rectal bleeding        Allergies   Allergen Reactions   • Ativan [Lorazepam] Hallucinations     confusion   • Sulfa Antibiotics Shortness Of Breath and Swelling   • Compazine [Prochlorperazine Edisylate] Hives   • Demerol [Meperidine] Hives   • Droperidol Itching   • Metoclopramide Swelling   • Toradol  [Ketorolac Tromethamine] Hives and Itching   • Zofran [Ondansetron Hcl] Rash       Past Surgical History:   Procedure Laterality Date   • ANAL SCOPE N/A 7/28/2016    Procedure: ANAL SCOPE;  Surgeon: Kael Lopez MD;  Location: [facility] OR;  Service:    • APPENDECTOMY     • COLONOSCOPY N/A 6/30/2016    Procedure: COLONOSCOPY  CPTCODE:02062;  Surgeon: Jose Antonio Belle III, MD;  Location: [facility] OR;  Service:    • COLONOSCOPY N/A 7/7/2016    Procedure: COLONOSCOPY (28252) CPT;  Surgeon: Jose Antonio Belle III, MD;  Location: [facility] OR;  Service:    • CYSTOSCOPY RETROGRADE PYELOGRAM Bilateral 4/28/2017    Procedure: CYSTOSCOPY RETROGRADE PYELOGRAM;  Surgeon: Mu Blunt MD;  Location: [facility] OR;  Service:    • ENDOSCOPY N/A 6/30/2016    Procedure: ESOPHAGOGASTRODUODENOSCOPY WITH BIOPSY  CPTCODE:11863;  Surgeon: Jose Antonio Belle III, MD;  Location: [facility] OR;  Service:    • HEMORRHOIDECTOMY N/A 7/28/2016    Procedure: HEMORRHOID STAPLING;  Surgeon: Kael Lopez MD;  Location: [facility] OR;  Service:    • HYSTERECTOMY     • KNEE SURGERY     • LAPAROSCOPIC SALPINGOOPHERECTOMY     • PORTACATH PLACEMENT N/A 7/28/2017    Procedure: INSERTION OF PORTACATH;  Surgeon: Celso Arredondo MD;  Location: [facility] OR;  Service:    • SHOULDER SURGERY      3 times       Family History   Problem Relation Age of Onset   • Crohn's disease Other    • Hypertension Other    • Diabetes Other    • Irritable bowel syndrome Other        Social History     Social History   • Marital status:      Spouse name: N/A   • Number of children: N/A   • Years of education: N/A     Social History Main Topics   • Smoking status: Former Smoker     Packs/day: 1.00     Years: 20.00     Quit date: 11/1/2015   • Smokeless tobacco: Never Used   • Alcohol use No   • Drug use: Yes     Special: Marijuana   • Sexual activity: Defer     Other Topics Concern   • None     Social History Narrative           Objective   Physical Exam    Constitutional: She is oriented to person, place, and time. She appears well-developed and well-nourished. No distress.   HENT:   Head: Normocephalic and atraumatic.   Right Ear: External ear normal.   Left Ear: External ear normal.   Nose: Nose normal.   Eyes: Conjunctivae and EOM are normal. Pupils are equal, round, and reactive to light.   Neck: Normal range of motion. Neck supple. No JVD present. No tracheal deviation present.   Cardiovascular: Normal rate, regular rhythm and normal heart sounds.    No murmur heard.  Pulmonary/Chest: Effort normal and breath sounds normal. No respiratory distress. She has no wheezes.   Abdominal: Soft. Bowel sounds are normal. There is no tenderness.   Musculoskeletal: Normal range of motion. She exhibits no edema or deformity.   Neurological: She is alert and oriented to person, place, and time. No cranial nerve deficit.   Skin: Skin is warm and dry. No rash noted. She is not diaphoretic. No erythema. No pallor.   Psychiatric: She has a normal mood and affect. Her behavior is normal. Thought content normal.   Nursing note and vitals reviewed.      Procedures         ED Course  ED Course                  MDM  Number of Diagnoses or Management Options  Chronic migraine: established and worsening  Risk of Complications, Morbidity, and/or Mortality  Presenting problems: low  Diagnostic procedures: minimal  Management options: minimal    Patient Progress  Patient progress: stable      Final diagnoses:   Chronic migraine            Jennifer Gonzalez, MIGDALIA  09/27/17 0051

## 2017-10-02 ENCOUNTER — TELEPHONE (OUTPATIENT)
Dept: GASTROENTEROLOGY | Facility: CLINIC | Age: 42
End: 2017-10-02

## 2017-10-02 NOTE — TELEPHONE ENCOUNTER
Lab test was positive for hepatitis C infection.  I believe that she already has follow-up office visit scheduled--remind her to keep this appointment.  MICAELA ABRAHAM

## 2017-10-03 NOTE — TELEPHONE ENCOUNTER
Spoke with patient and let her know her results. I also let her know to make sure she has a follow up as well as her procedure.

## 2017-10-12 ENCOUNTER — OFFICE VISIT (OUTPATIENT)
Dept: UROLOGY | Facility: CLINIC | Age: 42
End: 2017-10-12

## 2017-10-12 VITALS
DIASTOLIC BLOOD PRESSURE: 79 MMHG | SYSTOLIC BLOOD PRESSURE: 120 MMHG | BODY MASS INDEX: 24.14 KG/M2 | HEART RATE: 70 BPM | HEIGHT: 69 IN | WEIGHT: 163 LBS

## 2017-10-12 DIAGNOSIS — IMO0002 URETHRAL STENOSIS: ICD-10-CM

## 2017-10-12 DIAGNOSIS — R31.0 GROSS HEMATURIA: ICD-10-CM

## 2017-10-12 DIAGNOSIS — R39.89 BLADDER PAIN: Primary | ICD-10-CM

## 2017-10-12 PROCEDURE — 99213 OFFICE O/P EST LOW 20 MIN: CPT | Performed by: UROLOGY

## 2017-10-12 RX ORDER — OXYCODONE HYDROCHLORIDE AND ACETAMINOPHEN 5; 325 MG/1; MG/1
TABLET ORAL
Qty: 20 TABLET | Refills: 0 | Status: SHIPPED | OUTPATIENT
Start: 2017-10-12 | End: 2018-02-20 | Stop reason: SDUPTHER

## 2017-10-12 NOTE — PROGRESS NOTES
Chief Complaint:          Chief Complaint   Patient presents with   • BLADDER PAIN       HPI:   42 y.o. female.  42-year-old white female accompanied by her  she's failed to see the nephrologist as yet secondary to IgA nephropathy she has severe pain and needs to be dilated urgently.  I have no means to do it in this clinic and I will set her up for the office.  Strongly encouraged her to get into the nephrologist that she can get an appropriate therapy and stop her significant voiding problem at this juncture        Past Medical History:        Past Medical History:   Diagnosis Date   • Abdominal pain    • Abdominal swelling    • Hoyos's disease    • Bipolar 1 disorder    • Brain tumor     R Frontal Lobe per pt   • Brain tumor 2014   • Brain tumor (benign)    • Constipation    • DDD (degenerative disc disease), cervical 05/29/2017   • DDD (degenerative disc disease), cervical    • Diarrhea    • Fibromyalgia    • IBS (irritable bowel syndrome)    • Migraine    • Nausea & vomiting    • PONV (postoperative nausea and vomiting)    • PTSD (post-traumatic stress disorder)    • Rectal bleeding          Current Meds:     Current Outpatient Prescriptions   Medication Sig Dispense Refill   • azelastine (ASTEPRO) 0.15 % solution nasal spray 2 sprays into each nostril 2 (Two) Times a Day. 30 mL 0   • benzonatate (TESSALON) 200 MG capsule Take 1 capsule by mouth 3 (Three) Times a Day As Needed for Cough. 30 capsule 0   • busPIRone (BUSPAR) 15 MG tablet Take 15 mg by mouth 3 (three) times a day        • butorphanol (STADOL) 10 MG/ML nasal spray 1 spray into each nostril Daily.     • carbidopa-levodopa (SINEMET)  MG per tablet Take 1 tablet by mouth 3 (Three) Times a Day.     • cetirizine (zyrTEC) 10 MG tablet Take 1 tablet by mouth Daily. 30 tablet 0   • divalproex (DEPAKOTE) 500 MG DR tablet Take 500 mg by mouth 2 (Two) Times a Day.     • famotidine (PEPCID) 40 MG tablet Take 40 mg by mouth 2 (Two) Times a Day As  Needed for heartburn.     • guaiFENesin (MUCINEX) 600 MG 12 hr tablet Take 2 tablets by mouth 2 (Two) Times a Day. 20 tablet 0   • HYDROcodone-acetaminophen (NORCO) 7.5-325 MG per tablet Take 1 tablet by mouth Every 6 (Six) Hours As Needed for Moderate Pain (4-6).     • oxybutynin (DITROPAN) 5 MG tablet Take 1 tablet by mouth 3 (Three) Times a Day. 90 tablet 11   • oxyCODONE-acetaminophen (PERCOCET)  MG per tablet Take 1 tablet by mouth Every 6 (Six) Hours As Needed for Moderate Pain . 12 tablet 0   • oxyCODONE-acetaminophen (PERCOCET) 5-325 MG per tablet 1 to 2 Tablets Every 6 Hours as needed for PAIN 20 tablet 0   • pantoprazole (PROTONIX) 40 MG EC tablet Take 40 mg by mouth Daily As Needed.     • polyethylene glycol (GoLYTELY) 236 g solution Starting at 6 pm on day prior to procedure, drink 8 ounces every 15 minutes until all gone or stools are clear. May add flavor packet. 4000 mL 0   • potassium chloride (K-DUR) 10 MEQ CR tablet Take 1 tablet by mouth Daily. 12 tablet 0   • promethazine (PHENERGAN) 25 MG tablet Take 1 tablet by mouth Every 6 (Six) Hours As Needed for Nausea or Vomiting. 30 tablet 0   • sertraline (ZOLOFT) 100 MG tablet Take 150 mg by mouth daily.     • SUMAtriptan (IMITREX) 6 MG/0.5ML solution injection Inject 6 mg under the skin 1 (One) Time.       No current facility-administered medications for this visit.         Allergies:      Allergies   Allergen Reactions   • Ativan [Lorazepam] Hallucinations     confusion   • Sulfa Antibiotics Shortness Of Breath and Swelling   • Compazine [Prochlorperazine Edisylate] Hives   • Demerol [Meperidine] Hives   • Droperidol Itching   • Metoclopramide Swelling   • Toradol [Ketorolac Tromethamine] Hives and Itching   • Zofran [Ondansetron Hcl] Rash        Past Surgical History:     Past Surgical History:   Procedure Laterality Date   • ANAL SCOPE N/A 7/28/2016    Procedure: ANAL SCOPE;  Surgeon: Kael Lopez MD;  Location: [facility];  Service:    •  APPENDECTOMY     • COLONOSCOPY N/A 6/30/2016    Procedure: COLONOSCOPY  CPTCODE:21072;  Surgeon: Jose Antonio Belle III, MD;  Location: [facility] OR;  Service:    • COLONOSCOPY N/A 7/7/2016    Procedure: COLONOSCOPY (62841) CPT;  Surgeon: Jose Antonio Belle III, MD;  Location: [facility] OR;  Service:    • CYSTOSCOPY RETROGRADE PYELOGRAM Bilateral 4/28/2017    Procedure: CYSTOSCOPY RETROGRADE PYELOGRAM;  Surgeon: Mu Blunt MD;  Location: [facility] OR;  Service:    • ENDOSCOPY N/A 6/30/2016    Procedure: ESOPHAGOGASTRODUODENOSCOPY WITH BIOPSY  CPTCODE:62406;  Surgeon: Jose Antonio Belle III, MD;  Location: [facility] OR;  Service:    • HEMORRHOIDECTOMY N/A 7/28/2016    Procedure: HEMORRHOID STAPLING;  Surgeon: Kael Lopez MD;  Location: [facility] OR;  Service:    • HYSTERECTOMY     • KNEE SURGERY     • LAPAROSCOPIC SALPINGOOPHERECTOMY     • PORTACATH PLACEMENT N/A 7/28/2017    Procedure: INSERTION OF PORTACATH;  Surgeon: Celso Arredondo MD;  Location: [facility] OR;  Service:    • SHOULDER SURGERY      3 times         Social History:     Social History     Social History   • Marital status:      Spouse name: N/A   • Number of children: N/A   • Years of education: N/A     Occupational History   • Not on file.     Social History Main Topics   • Smoking status: Former Smoker     Packs/day: 1.00     Years: 20.00     Quit date: 11/1/2015   • Smokeless tobacco: Never Used   • Alcohol use No   • Drug use: Yes     Special: Marijuana   • Sexual activity: Defer     Other Topics Concern   • Not on file     Social History Narrative       Family History:     Family History   Problem Relation Age of Onset   • Crohn's disease Other    • Hypertension Other    • Diabetes Other    • Irritable bowel syndrome Other        Review of Systems:     Review of Systems   Constitutional: Negative.  Negative for activity change, appetite change, chills, diaphoresis, fatigue and unexpected weight change.   HENT: Negative for  congestion, dental problem, drooling, ear discharge, ear pain, facial swelling, hearing loss, mouth sores, nosebleeds, postnasal drip, rhinorrhea, sinus pressure, sneezing, sore throat, tinnitus, trouble swallowing and voice change.    Eyes: Negative.  Negative for photophobia, pain, discharge, redness, itching and visual disturbance.   Respiratory: Negative.  Negative for apnea, cough, choking, chest tightness, shortness of breath, wheezing and stridor.    Cardiovascular: Negative.  Negative for chest pain, palpitations and leg swelling.   Gastrointestinal: Negative.  Negative for abdominal distention, abdominal pain, anal bleeding, blood in stool, constipation, diarrhea, nausea, rectal pain and vomiting.   Endocrine: Negative.  Negative for cold intolerance, heat intolerance, polydipsia, polyphagia and polyuria.   Genitourinary: Positive for difficulty urinating, frequency, hematuria and pelvic pain.   Musculoskeletal: Negative.  Negative for arthralgias, back pain, gait problem, joint swelling, myalgias, neck pain and neck stiffness.   Skin: Negative.  Negative for color change, pallor, rash and wound.   Allergic/Immunologic: Negative.  Negative for environmental allergies, food allergies and immunocompromised state.   Neurological: Negative.  Negative for dizziness, tremors, seizures, syncope, facial asymmetry, speech difficulty, weakness, light-headedness, numbness and headaches.   Hematological: Negative.  Negative for adenopathy. Does not bruise/bleed easily.   Psychiatric/Behavioral: Negative for agitation, behavioral problems, confusion, decreased concentration, dysphoric mood, hallucinations, self-injury, sleep disturbance and suicidal ideas. The patient is not nervous/anxious and is not hyperactive.    All other systems reviewed and are negative.      Physical Exam:     Physical Exam   Constitutional: She appears well-developed and well-nourished.   HENT:   Head: Normocephalic and atraumatic.   Right Ear:  External ear normal.   Left Ear: External ear normal.   Mouth/Throat: Oropharynx is clear and moist.   Eyes: Conjunctivae are normal. Pupils are equal, round, and reactive to light.   Cardiovascular: Normal rate, regular rhythm, normal heart sounds and intact distal pulses.    Pulmonary/Chest: Effort normal and breath sounds normal.   Abdominal: Soft. Bowel sounds are normal. She exhibits no distension and no mass. There is no tenderness. There is no rebound and no guarding.   Genitourinary: No vaginal discharge found.   Musculoskeletal: Normal range of motion.   Neurological: She is alert. She has normal reflexes.   Skin: Skin is warm and dry.   Psychiatric: She has a normal mood and affect. Her behavior is normal. Judgment and thought content normal.       Procedure:       Assessment:   No diagnosis found.  No orders of the defined types were placed in this encounter.      Plan:   Urethral stenosis-severe she will need intervention  Narcotic pain medication-patient has significant acute pain that I believe would be an indication for the use of narcotic pain medication.  I discussed the significant risks of pain medication and the fact that this will be a short only option and I will give her no more than a three-day supply of pain medication.  And I will not plan long-term medication and that this will be sent to a pain clinic if at all becomes necessary.  We discussed signing a pain medication agreement and the fact that we're going to run a state [facility] review to be sure the patient is not getting pain medication from elsewhere.  If this is the case we will not give pain medication.  As part of the patient's treatment plan of there being prescribed a controlled substance with potential for abuse.  This patient has been wait aware of the appropriate dose of such medications including, the risk for somnolence, limited ability to drive and/or safety and the significant potential for overdose.  It is been made clear  that these medications are for the prescribed patient only without concomitant use of alcohol or other sepsis unless prescribed by the medical provider.  Has completed prescribing agreement detailing the terms of continue prescribing him a controlled substance.  Including monitoring Luis Alberto ports, the possibility of urine drug screens and pedal counts.  The patient is aware that we reviewed LUIS ALBERTO reports on a regular basis and scan them into the chart.  History and physical examination exhibited continued safe and appropriate use of controlled substances.           This document has been electronically signed by VERENICE FINK MD October 12, 2017 1:07 PM

## 2017-10-16 ENCOUNTER — OFFICE VISIT (OUTPATIENT)
Dept: UROLOGY | Facility: CLINIC | Age: 42
End: 2017-10-16

## 2017-10-16 VITALS
WEIGHT: 163 LBS | HEART RATE: 70 BPM | BODY MASS INDEX: 24.14 KG/M2 | DIASTOLIC BLOOD PRESSURE: 79 MMHG | HEIGHT: 69 IN | SYSTOLIC BLOOD PRESSURE: 120 MMHG

## 2017-10-16 DIAGNOSIS — N30.10 IC (INTERSTITIAL CYSTITIS): Primary | ICD-10-CM

## 2017-10-16 PROCEDURE — 53661 DILATION OF URETHRA: CPT | Performed by: UROLOGY

## 2017-10-16 PROCEDURE — 96372 THER/PROPH/DIAG INJ SC/IM: CPT | Performed by: UROLOGY

## 2017-10-16 RX ORDER — GENTAMICIN SULFATE 40 MG/ML
80 INJECTION, SOLUTION INTRAMUSCULAR; INTRAVENOUS ONCE
Status: COMPLETED | OUTPATIENT
Start: 2017-10-16 | End: 2017-10-16

## 2017-10-16 RX ADMIN — GENTAMICIN SULFATE 80 MG: 40 INJECTION, SOLUTION INTRAMUSCULAR; INTRAVENOUS at 15:32

## 2017-10-16 NOTE — PROGRESS NOTES
Chief Complaint:          Chief Complaint   Patient presents with   • IC       HPI:   42 y.o. female.  32-year-old white female with IgA nephropathy and severe urethral stenosis here for dilatation        Past Medical History:        Past Medical History:   Diagnosis Date   • Abdominal pain    • Abdominal swelling    • Hoyos's disease    • Bipolar 1 disorder    • Brain tumor     R Frontal Lobe per pt   • Brain tumor 2014   • Brain tumor (benign)    • Constipation    • DDD (degenerative disc disease), cervical 05/29/2017   • DDD (degenerative disc disease), cervical    • Diarrhea    • Fibromyalgia    • IBS (irritable bowel syndrome)    • Migraine    • Nausea & vomiting    • PONV (postoperative nausea and vomiting)    • PTSD (post-traumatic stress disorder)    • Rectal bleeding          Current Meds:     Current Outpatient Prescriptions   Medication Sig Dispense Refill   • azelastine (ASTEPRO) 0.15 % solution nasal spray 2 sprays into each nostril 2 (Two) Times a Day. 30 mL 0   • benzonatate (TESSALON) 200 MG capsule Take 1 capsule by mouth 3 (Three) Times a Day As Needed for Cough. 30 capsule 0   • busPIRone (BUSPAR) 15 MG tablet Take 15 mg by mouth 3 (three) times a day        • butorphanol (STADOL) 10 MG/ML nasal spray 1 spray into each nostril Daily.     • carbidopa-levodopa (SINEMET)  MG per tablet Take 1 tablet by mouth 3 (Three) Times a Day.     • cetirizine (zyrTEC) 10 MG tablet Take 1 tablet by mouth Daily. 30 tablet 0   • divalproex (DEPAKOTE) 500 MG DR tablet Take 500 mg by mouth 2 (Two) Times a Day.     • famotidine (PEPCID) 40 MG tablet Take 40 mg by mouth 2 (Two) Times a Day As Needed for heartburn.     • guaiFENesin (MUCINEX) 600 MG 12 hr tablet Take 2 tablets by mouth 2 (Two) Times a Day. 20 tablet 0   • HYDROcodone-acetaminophen (NORCO) 7.5-325 MG per tablet Take 1 tablet by mouth Every 6 (Six) Hours As Needed for Moderate Pain (4-6).     • oxybutynin (DITROPAN) 5 MG tablet Take 1 tablet by  mouth 3 (Three) Times a Day. 90 tablet 11   • oxyCODONE-acetaminophen (PERCOCET)  MG per tablet Take 1 tablet by mouth Every 6 (Six) Hours As Needed for Moderate Pain . 12 tablet 0   • oxyCODONE-acetaminophen (PERCOCET) 5-325 MG per tablet 1 to 2 Tablets Every 6 Hours as needed for PAIN 20 tablet 0   • oxyCODONE-acetaminophen (PERCOCET) 5-325 MG per tablet 1 to 2 Tablets Every 6 Hours as needed for PAIN 20 tablet 0   • pantoprazole (PROTONIX) 40 MG EC tablet Take 40 mg by mouth Daily As Needed.     • polyethylene glycol (GoLYTELY) 236 g solution Starting at 6 pm on day prior to procedure, drink 8 ounces every 15 minutes until all gone or stools are clear. May add flavor packet. 4000 mL 0   • potassium chloride (K-DUR) 10 MEQ CR tablet Take 1 tablet by mouth Daily. 12 tablet 0   • promethazine (PHENERGAN) 25 MG tablet Take 1 tablet by mouth Every 6 (Six) Hours As Needed for Nausea or Vomiting. 30 tablet 0   • sertraline (ZOLOFT) 100 MG tablet Take 150 mg by mouth daily.     • SUMAtriptan (IMITREX) 6 MG/0.5ML solution injection Inject 6 mg under the skin 1 (One) Time.       No current facility-administered medications for this visit.         Allergies:      Allergies   Allergen Reactions   • Ativan [Lorazepam] Hallucinations     confusion   • Sulfa Antibiotics Shortness Of Breath and Swelling   • Compazine [Prochlorperazine Edisylate] Hives   • Demerol [Meperidine] Hives   • Droperidol Itching   • Metoclopramide Swelling   • Toradol [Ketorolac Tromethamine] Hives and Itching   • Zofran [Ondansetron Hcl] Rash        Past Surgical History:     Past Surgical History:   Procedure Laterality Date   • ANAL SCOPE N/A 7/28/2016    Procedure: ANAL SCOPE;  Surgeon: Kael Lopez MD;  Location:  [facility] OR;  Service:    • APPENDECTOMY     • COLONOSCOPY N/A 6/30/2016    Procedure: COLONOSCOPY  CPTCODE:76394;  Surgeon: Jose Antonio Belle III, MD;  Location:  [facility] OR;  Service:    • COLONOSCOPY N/A 7/7/2016    Procedure:  COLONOSCOPY (41771) CPT;  Surgeon: Jose Antonio Belle III, MD;  Location: [facility] OR;  Service:    • CYSTOSCOPY RETROGRADE PYELOGRAM Bilateral 4/28/2017    Procedure: CYSTOSCOPY RETROGRADE PYELOGRAM;  Surgeon: Mu Blunt MD;  Location: [facility] OR;  Service:    • ENDOSCOPY N/A 6/30/2016    Procedure: ESOPHAGOGASTRODUODENOSCOPY WITH BIOPSY  CPTCODE:87618;  Surgeon: Jose Antonio Belle III, MD;  Location: [facility] OR;  Service:    • HEMORRHOIDECTOMY N/A 7/28/2016    Procedure: HEMORRHOID STAPLING;  Surgeon: Kael Lopez MD;  Location: [facility] OR;  Service:    • HYSTERECTOMY     • KNEE SURGERY     • LAPAROSCOPIC SALPINGOOPHERECTOMY     • PORTACATH PLACEMENT N/A 7/28/2017    Procedure: INSERTION OF PORTACATH;  Surgeon: Celso Arredondo MD;  Location: [facility];  Service:    • SHOULDER SURGERY      3 times         Social History:     Social History     Social History   • Marital status:      Spouse name: N/A   • Number of children: N/A   • Years of education: N/A     Occupational History   • Not on file.     Social History Main Topics   • Smoking status: Former Smoker     Packs/day: 1.00     Years: 20.00     Quit date: 11/1/2015   • Smokeless tobacco: Never Used   • Alcohol use No   • Drug use: Yes     Special: Marijuana   • Sexual activity: Defer     Other Topics Concern   • Not on file     Social History Narrative       Family History:     Family History   Problem Relation Age of Onset   • Crohn's disease Other    • Hypertension Other    • Diabetes Other    • Irritable bowel syndrome Other        Review of Systems:     Review of Systems    Physical Exam:     Physical Exam    Procedure:     urethral dilation-after an appropriate informed consent the patient was brought to the procedure suite.  The urethra was gently anesthetized with 10 cc of 2% viscous Xylocaine jelly.  After an adequate period of topical anesthesia I went ahead .  There was an obvious stenosis present.   the urethra was gently  anesthetized and dilated with Reese sounds from 16-28 Italian sequentially without complication the patient was given gentamicin as prophylaxis with 80 mg  Assessment:     Encounter Diagnosis   Name Primary?   • IC (interstitial cystitis) Yes     No orders of the defined types were placed in this encounter.      Plan:   Urethral stenosis status post dilation           This document has been electronically signed by VERENICE FINK MD October 16, 2017 3:33 PM

## 2017-10-19 ENCOUNTER — HOSPITAL ENCOUNTER (EMERGENCY)
Facility: HOSPITAL | Age: 42
Discharge: HOME | End: 2017-10-19
Attending: EMERGENCY MEDICINE | Admitting: EMERGENCY MEDICINE

## 2017-10-19 ENCOUNTER — APPOINTMENT (OUTPATIENT)
Dept: CT IMAGING | Facility: HOSPITAL | Age: 42
End: 2017-10-19

## 2017-10-19 VITALS
HEART RATE: 66 BPM | WEIGHT: 168 LBS | DIASTOLIC BLOOD PRESSURE: 83 MMHG | HEIGHT: 69 IN | TEMPERATURE: 98.6 F | SYSTOLIC BLOOD PRESSURE: 123 MMHG | BODY MASS INDEX: 24.88 KG/M2 | OXYGEN SATURATION: 99 % | RESPIRATION RATE: 18 BRPM

## 2017-10-19 DIAGNOSIS — G43.901 STATUS MIGRAINOSUS: Primary | ICD-10-CM

## 2017-10-19 PROCEDURE — 25010000002 BUTORPHANOL PER 1 MG: Performed by: EMERGENCY MEDICINE

## 2017-10-19 PROCEDURE — 25010000002 DIHYDROERGOTAMINE MESYLATE PER 1 MG: Performed by: EMERGENCY MEDICINE

## 2017-10-19 PROCEDURE — 25010000002 PROMETHAZINE PER 50 MG: Performed by: EMERGENCY MEDICINE

## 2017-10-19 PROCEDURE — 99283 EMERGENCY DEPT VISIT LOW MDM: CPT

## 2017-10-19 PROCEDURE — 70450 CT HEAD/BRAIN W/O DYE: CPT

## 2017-10-19 PROCEDURE — 70450 CT HEAD/BRAIN W/O DYE: CPT | Performed by: RADIOLOGY

## 2017-10-19 PROCEDURE — 96372 THER/PROPH/DIAG INJ SC/IM: CPT

## 2017-10-19 RX ORDER — DIHYDROERGOTAMINE MESYLATE 1 MG/ML
0.5 INJECTION, SOLUTION INTRAMUSCULAR; INTRAVENOUS; SUBCUTANEOUS ONCE
Status: COMPLETED | OUTPATIENT
Start: 2017-10-19 | End: 2017-10-19

## 2017-10-19 RX ORDER — PROMETHAZINE HYDROCHLORIDE 25 MG/ML
25 INJECTION, SOLUTION INTRAMUSCULAR; INTRAVENOUS ONCE
Status: COMPLETED | OUTPATIENT
Start: 2017-10-19 | End: 2017-10-19

## 2017-10-19 RX ADMIN — PROMETHAZINE HYDROCHLORIDE 25 MG: 25 INJECTION INTRAMUSCULAR; INTRAVENOUS at 15:39

## 2017-10-19 RX ADMIN — BUTORPHANOL TARTRATE 1 MG: 2 INJECTION, SOLUTION INTRAMUSCULAR; INTRAVENOUS at 17:03

## 2017-10-19 RX ADMIN — BUTORPHANOL TARTRATE 2 MG: 2 INJECTION, SOLUTION INTRAMUSCULAR; INTRAVENOUS at 15:38

## 2017-10-19 RX ADMIN — DIHYDROERGOTAMINE MESYLATE 0.5 MG: 1 INJECTION, SOLUTION INTRAMUSCULAR; INTRAVENOUS; SUBCUTANEOUS at 17:04

## 2017-10-19 NOTE — ED NOTES
Dr. Ware at bedside speaking with patient and family regarding test results. Will continue to monitor.      Cici Huynh RN  10/19/17 1640

## 2017-10-19 NOTE — ED NOTES
Awaiting completion of shot time, discharge papers ready and patient to be discharge upon completion of shot time.       Cici Huynh RN  10/19/17 3255

## 2017-10-19 NOTE — ED NOTES
Discharge instructions reviewed with patient, patient instructed to return to ED if symptoms worsen or if any new problems arise. Patient verbalizes understanding of discharge instructions, patient ambulatory out of ED. No acute distress noted.       Cici Huynh RN  10/19/17 6824

## 2017-10-19 NOTE — ED PROVIDER NOTES
"Subjective   History of Present Illness  43-year-old white female complains of headache.  She has history of migraine headaches, but states this headache is somewhat different.  She describes a headache throughout her entire head \"like somebody hit him in the head\".  She has mild photophobia and nausea, but denies any phonophobia, scotomata, or other neurologic complaints.  She states that she has pain in her neck which is worse when she moves her head.  However, she states she also has a bulging disc in her neck and has neck pain at times.  She denies any fever or chills.  She has taken multiple migraine medicines at home without relief.  Review of Systems   All other systems reviewed and are negative.      Past Medical History:   Diagnosis Date   • Abdominal pain    • Abdominal swelling    • Hoyos's disease    • Bipolar 1 disorder    • Brain tumor     R Frontal Lobe per pt   • Brain tumor 2014   • Brain tumor (benign)    • Constipation    • DDD (degenerative disc disease), cervical 05/29/2017   • DDD (degenerative disc disease), cervical    • Diarrhea    • Fibromyalgia    • IBS (irritable bowel syndrome)    • Migraine    • Nausea & vomiting    • PONV (postoperative nausea and vomiting)    • PTSD (post-traumatic stress disorder)    • Rectal bleeding        Allergies   Allergen Reactions   • Ativan [Lorazepam] Hallucinations     confusion   • Sulfa Antibiotics Shortness Of Breath and Swelling   • Compazine [Prochlorperazine Edisylate] Hives   • Demerol [Meperidine] Hives   • Droperidol Itching   • Metoclopramide Swelling   • Toradol [Ketorolac Tromethamine] Hives and Itching   • Zofran [Ondansetron Hcl] Rash       Past Surgical History:   Procedure Laterality Date   • ANAL SCOPE N/A 7/28/2016    Procedure: ANAL SCOPE;  Surgeon: Kael Lopez MD;  Location: [facility];  Service:    • APPENDECTOMY     • COLONOSCOPY N/A 6/30/2016    Procedure: COLONOSCOPY  CPTCODE:33023;  Surgeon: Jose Antonio Belle III, MD;  " Location: [facility] OR;  Service:    • COLONOSCOPY N/A 7/7/2016    Procedure: COLONOSCOPY (70505) CPT;  Surgeon: Jose Antonio Belle III, MD;  Location: [facility] OR;  Service:    • CYSTOSCOPY RETROGRADE PYELOGRAM Bilateral 4/28/2017    Procedure: CYSTOSCOPY RETROGRADE PYELOGRAM;  Surgeon: Mu Blunt MD;  Location: [facility] OR;  Service:    • ENDOSCOPY N/A 6/30/2016    Procedure: ESOPHAGOGASTRODUODENOSCOPY WITH BIOPSY  CPTCODE:39630;  Surgeon: Jose Antonio Belle III, MD;  Location: [facility] OR;  Service:    • HEMORRHOIDECTOMY N/A 7/28/2016    Procedure: HEMORRHOID STAPLING;  Surgeon: Kael Lopez MD;  Location: [facility] OR;  Service:    • HYSTERECTOMY     • KNEE SURGERY     • LAPAROSCOPIC SALPINGOOPHERECTOMY     • PORTACATH PLACEMENT N/A 7/28/2017    Procedure: INSERTION OF PORTACATH;  Surgeon: Celso Arredondo MD;  Location: [facility] OR;  Service:    • SHOULDER SURGERY      3 times       Family History   Problem Relation Age of Onset   • Crohn's disease Other    • Hypertension Other    • Diabetes Other    • Irritable bowel syndrome Other        Social History     Social History   • Marital status:      Spouse name: N/A   • Number of children: N/A   • Years of education: N/A     Social History Main Topics   • Smoking status: Former Smoker     Packs/day: 1.00     Years: 20.00     Quit date: 11/1/2015   • Smokeless tobacco: Never Used   • Alcohol use No   • Drug use: Yes     Special: Marijuana   • Sexual activity: Defer     Other Topics Concern   • None     Social History Narrative           Objective   Physical Exam   Constitutional: She is oriented to person, place, and time. She appears well-developed and well-nourished.   HENT:   Head: Normocephalic and atraumatic.   Eyes: Conjunctivae and EOM are normal. Pupils are equal, round, and reactive to light.   Neck: Normal range of motion. Neck supple. No Brudzinski's sign and no Kernig's sign noted.   Cardiovascular: Normal rate, regular rhythm, normal  heart sounds and intact distal pulses.  Exam reveals no gallop and no friction rub.    No murmur heard.  Pulmonary/Chest: Effort normal and breath sounds normal. No respiratory distress. She has no wheezes. She has no rales.   Abdominal: Soft. Bowel sounds are normal. She exhibits no distension. There is no tenderness.   Musculoskeletal: Normal range of motion.   Neurological: She is alert and oriented to person, place, and time. She has normal strength. No cranial nerve deficit or sensory deficit.   Skin: Skin is warm and dry.   Psychiatric: She has a normal mood and affect.   Nursing note and vitals reviewed.      Procedures         ED Course  ED Course   Comment By Time   Headache improved, but not resolved, after pain meds. Moreno Ware MD 10/19 165   Headache improved from 9/10 to 3/10 at discharge. Moreno Ware MD 10/19 9766                  MDM  Number of Diagnoses or Management Options  Status migrainosus:   Risk of Complications, Morbidity, and/or Mortality  Presenting problems: high  Diagnostic procedures: moderate  Management options: moderate        Final diagnoses:   Status migrainosus            Moreno Ware MD  10/19/17 3045

## 2017-10-30 ENCOUNTER — OFFICE VISIT (OUTPATIENT)
Dept: UROLOGY | Facility: CLINIC | Age: 42
End: 2017-10-30

## 2017-10-30 VITALS
HEART RATE: 66 BPM | BODY MASS INDEX: 24.88 KG/M2 | HEIGHT: 69 IN | SYSTOLIC BLOOD PRESSURE: 123 MMHG | DIASTOLIC BLOOD PRESSURE: 83 MMHG | WEIGHT: 168 LBS

## 2017-10-30 DIAGNOSIS — IMO0002 URETHRAL STENOSIS: Primary | ICD-10-CM

## 2017-10-30 PROCEDURE — 53661 DILATION OF URETHRA: CPT | Performed by: UROLOGY

## 2017-10-30 PROCEDURE — 99213 OFFICE O/P EST LOW 20 MIN: CPT | Performed by: UROLOGY

## 2017-10-30 PROCEDURE — 96372 THER/PROPH/DIAG INJ SC/IM: CPT | Performed by: UROLOGY

## 2017-10-30 NOTE — PROGRESS NOTES
Chief Complaint:          Chief Complaint   Patient presents with   • BLADDER PAIN     POSSIBLE DIALTION       HPI:   32 Year old white female.  [place]  with IgA nephropathy, hepatitis C, severe urethral stenosis who presents for urethral dilation.  She is desirous of more pain medication but I explained to her carefully as long as she is on hydrocodone and I will not give her any more pain medication.  And if she's not on it she can only have several pills around the time of her dilation.  I also explained to her there is no permanent fix of the situation other than self dilatation which she refuses to do          Past Medical History:        Past Medical History:   Diagnosis Date   • Abdominal pain    • Abdominal swelling    • Hoyos's disease    • Bipolar 1 disorder    • Brain tumor     R Frontal Lobe per pt   • Brain tumor 2014   • Brain tumor (benign)    • Constipation    • DDD (degenerative disc disease), cervical 05/29/2017   • DDD (degenerative disc disease), cervical    • Diarrhea    • Fibromyalgia    • IBS (irritable bowel syndrome)    • Migraine    • Nausea & vomiting    • PONV (postoperative nausea and vomiting)    • PTSD (post-traumatic stress disorder)    • Rectal bleeding          Current Meds:     Current Outpatient Prescriptions   Medication Sig Dispense Refill   • azelastine (ASTEPRO) 0.15 % solution nasal spray 2 sprays into each nostril 2 (Two) Times a Day. 30 mL 0   • benzonatate (TESSALON) 200 MG capsule Take 1 capsule by mouth 3 (Three) Times a Day As Needed for Cough. 30 capsule 0   • busPIRone (BUSPAR) 15 MG tablet Take 15 mg by mouth 3 (three) times a day        • butorphanol (STADOL) 10 MG/ML nasal spray 1 spray into each nostril Daily.     • carbidopa-levodopa (SINEMET)  MG per tablet Take 1 tablet by mouth 3 (Three) Times a Day.     • cetirizine (zyrTEC) 10 MG tablet Take 1 tablet by mouth Daily. 30 tablet 0   • divalproex (DEPAKOTE) 500 MG DR tablet Take 500 mg by mouth 2  (Two) Times a Day.     • famotidine (PEPCID) 40 MG tablet Take 40 mg by mouth 2 (Two) Times a Day As Needed for heartburn.     • guaiFENesin (MUCINEX) 600 MG 12 hr tablet Take 2 tablets by mouth 2 (Two) Times a Day. 20 tablet 0   • HYDROcodone-acetaminophen (NORCO) 7.5-325 MG per tablet Take 1 tablet by mouth Every 6 (Six) Hours As Needed for Moderate Pain (4-6).     • oxybutynin (DITROPAN) 5 MG tablet Take 1 tablet by mouth 3 (Three) Times a Day. 90 tablet 11   • oxyCODONE-acetaminophen (PERCOCET)  MG per tablet Take 1 tablet by mouth Every 6 (Six) Hours As Needed for Moderate Pain . 12 tablet 0   • oxyCODONE-acetaminophen (PERCOCET) 5-325 MG per tablet 1 to 2 Tablets Every 6 Hours as needed for PAIN 20 tablet 0   • oxyCODONE-acetaminophen (PERCOCET) 5-325 MG per tablet 1 to 2 Tablets Every 6 Hours as needed for PAIN 20 tablet 0   • pantoprazole (PROTONIX) 40 MG EC tablet Take 40 mg by mouth Daily As Needed.     • polyethylene glycol (GoLYTELY) 236 g solution Starting at 6 pm on day prior to procedure, drink 8 ounces every 15 minutes until all gone or stools are clear. May add flavor packet. 4000 mL 0   • potassium chloride (K-DUR) 10 MEQ CR tablet Take 1 tablet by mouth Daily. 12 tablet 0   • promethazine (PHENERGAN) 25 MG tablet Take 1 tablet by mouth Every 6 (Six) Hours As Needed for Nausea or Vomiting. 30 tablet 0   • sertraline (ZOLOFT) 100 MG tablet Take 150 mg by mouth daily.     • SUMAtriptan (IMITREX) 6 MG/0.5ML solution injection Inject 6 mg under the skin 1 (One) Time.       No current facility-administered medications for this visit.         Allergies:      Allergies   Allergen Reactions   • Ativan [Lorazepam] Hallucinations     confusion   • Sulfa Antibiotics Shortness Of Breath and Swelling   • Compazine [Prochlorperazine Edisylate] Hives   • Demerol [Meperidine] Hives   • Droperidol Itching   • Metoclopramide Swelling   • Toradol [Ketorolac Tromethamine] Hives and Itching   • Zofran  [Ondansetron Hcl] Rash        Past Surgical History:     Past Surgical History:   Procedure Laterality Date   • ANAL SCOPE N/A 7/28/2016    Procedure: ANAL SCOPE;  Surgeon: Kael Lopez MD;  Location: [facility] OR;  Service:    • APPENDECTOMY     • COLONOSCOPY N/A 6/30/2016    Procedure: COLONOSCOPY  CPTCODE:02401;  Surgeon: Jose Antonio Belle III, MD;  Location: [facility] OR;  Service:    • COLONOSCOPY N/A 7/7/2016    Procedure: COLONOSCOPY (95355) CPT;  Surgeon: Jose Antonio Belle III, MD;  Location: [facility] OR;  Service:    • CYSTOSCOPY RETROGRADE PYELOGRAM Bilateral 4/28/2017    Procedure: CYSTOSCOPY RETROGRADE PYELOGRAM;  Surgeon: Mu Blunt MD;  Location: [facility] OR;  Service:    • ENDOSCOPY N/A 6/30/2016    Procedure: ESOPHAGOGASTRODUODENOSCOPY WITH BIOPSY  CPTCODE:62890;  Surgeon: Jose Antonio Belle III, MD;  Location: [facility] OR;  Service:    • HEMORRHOIDECTOMY N/A 7/28/2016    Procedure: HEMORRHOID STAPLING;  Surgeon: Kael Lopez MD;  Location: [facility] OR;  Service:    • HYSTERECTOMY     • KNEE SURGERY     • LAPAROSCOPIC SALPINGOOPHERECTOMY     • PORTACATH PLACEMENT N/A 7/28/2017    Procedure: INSERTION OF PORTACATH;  Surgeon: Celso Arredondo MD;  Location: [facility];  Service:    • SHOULDER SURGERY      3 times         Social History:     Social History     Social History   • Marital status:      Spouse name: N/A   • Number of children: N/A   • Years of education: N/A     Occupational History   • Not on file.     Social History Main Topics   • Smoking status: Former Smoker     Packs/day: 1.00     Years: 20.00     Quit date: 11/1/2015   • Smokeless tobacco: Never Used   • Alcohol use No   • Drug use: Yes     Special: Marijuana   • Sexual activity: Defer     Other Topics Concern   • Not on file     Social History Narrative       Family History:     Family History   Problem Relation Age of Onset   • Crohn's disease Other    • Hypertension Other    • Diabetes Other    • Irritable bowel  syndrome Other        Review of Systems:     Review of Systems    Physical Exam:     Physical Exam    Procedure:    urethral dilation-after an appropriate informed consent the patient was brought to the procedure suite.  The urethra was gently anesthetized with 10 cc of 2% viscous Xylocaine jelly.  After an adequate period of topical anesthesia  There was an obvious stenosis present.     the urethra was gently anesthetized and dilated with [place] sounds from 16-26 [place] sequentially without complication the patient was given gentamicin as prophylaxis with 80 mg  Assessment:   No diagnosis found.  No orders of the defined types were placed in this encounter.      Plan:   Urethral stenosis-severe  Narcotic pain medication-patient has significant acute pain that I believe would be an indication for the use of narcotic pain medication.  I discussed the significant risks of pain medication and the fact that this will be a short only option and I will give her no more than a three-day supply of pain medication.  And I will not plan long-term medication and that this will be sent to a pain clinic if at all becomes necessary.  We discussed signing a pain medication agreement and the fact that we're going to run a state [facility] review to be sure the patient is not getting pain medication from elsewhere.  If this is the case we will not give pain medication.  As part of the patient's treatment plan of there being prescribed a controlled substance with potential for abuse.  This patient has been wait aware of the appropriate dose of such medications including, the risk for somnolence, limited ability to drive and/or safety and the significant potential for overdose.  It is been made clear that these medications are for the prescribed patient only without concomitant use of alcohol or other sepsis unless prescribed by the medical provider.  Has completed prescribing agreement detailing the terms of continue prescribing him a  controlled substance.  Including monitoring Luis Alberto ports, the possibility of urine drug screens and pedal counts.  The patient is aware that we reviewed LUIS ALBERTO reports on a regular basis and scan them into the chart.  History and physical examination exhibited continued safe and appropriate use of controlled substances.           This document has been electronically signed by VERENICE FINK MD October 30, 2017 2:30 PM

## 2017-10-31 ENCOUNTER — HOSPITAL ENCOUNTER (EMERGENCY)
Facility: HOSPITAL | Age: 42
Discharge: HOME | End: 2017-10-31
Attending: FAMILY MEDICINE | Admitting: FAMILY MEDICINE

## 2017-10-31 ENCOUNTER — APPOINTMENT (OUTPATIENT)
Dept: CT IMAGING | Facility: HOSPITAL | Age: 42
End: 2017-10-31

## 2017-10-31 VITALS
WEIGHT: 168 LBS | OXYGEN SATURATION: 98 % | RESPIRATION RATE: 18 BRPM | TEMPERATURE: 97.4 F | HEART RATE: 68 BPM | DIASTOLIC BLOOD PRESSURE: 61 MMHG | SYSTOLIC BLOOD PRESSURE: 125 MMHG | HEIGHT: 69 IN | BODY MASS INDEX: 24.88 KG/M2

## 2017-10-31 DIAGNOSIS — R31.0 GROSS HEMATURIA: ICD-10-CM

## 2017-10-31 DIAGNOSIS — N23 RENAL COLIC: Primary | ICD-10-CM

## 2017-10-31 LAB
ALBUMIN SERPL-MCNC: 4 G/DL (ref 3.5–5)
ALBUMIN/GLOB SERPL: 1.4 G/DL (ref 1.5–2.5)
ALP SERPL-CCNC: 80 U/L (ref 35–104)
ALT SERPL W P-5'-P-CCNC: 56 U/L (ref 10–36)
ANION GAP SERPL CALCULATED.3IONS-SCNC: 5.3 MMOL/L (ref 3.6–11.2)
AST SERPL-CCNC: 46 U/L (ref 10–30)
BACTERIA UR QL AUTO: ABNORMAL /HPF
BASOPHILS # BLD AUTO: 0.01 10*3/MM3 (ref 0–0.3)
BASOPHILS NFR BLD AUTO: 0.2 % (ref 0–2)
BILIRUB SERPL-MCNC: 0.6 MG/DL (ref 0.2–1.8)
BILIRUB UR QL STRIP: NEGATIVE
BUN BLD-MCNC: 6 MG/DL (ref 7–21)
BUN/CREAT SERPL: 10.5 (ref 7–25)
CALCIUM SPEC-SCNC: 9.2 MG/DL (ref 7.7–10)
CHLORIDE SERPL-SCNC: 107 MMOL/L (ref 99–112)
CLARITY UR: ABNORMAL
CO2 SERPL-SCNC: 28.7 MMOL/L (ref 24.3–31.9)
COLOR UR: ABNORMAL
CREAT BLD-MCNC: 0.57 MG/DL (ref 0.43–1.29)
DEPRECATED RDW RBC AUTO: 41.5 FL (ref 37–54)
EOSINOPHIL # BLD AUTO: 0.13 10*3/MM3 (ref 0–0.7)
EOSINOPHIL NFR BLD AUTO: 2.3 % (ref 0–5)
ERYTHROCYTE [DISTWIDTH] IN BLOOD BY AUTOMATED COUNT: 13 % (ref 11.5–14.5)
GFR SERPL CREATININE-BSD FRML MDRD: 116 ML/MIN/1.73
GLOBULIN UR ELPH-MCNC: 2.8 GM/DL
GLUCOSE BLD-MCNC: 91 MG/DL (ref 70–110)
GLUCOSE UR STRIP-MCNC: NEGATIVE MG/DL
HCT VFR BLD AUTO: 38.1 % (ref 37–47)
HGB BLD-MCNC: 12.7 G/DL (ref 12–16)
HGB UR QL STRIP.AUTO: ABNORMAL
HYALINE CASTS UR QL AUTO: ABNORMAL /LPF
IMM GRANULOCYTES # BLD: 0.01 10*3/MM3 (ref 0–0.03)
IMM GRANULOCYTES NFR BLD: 0.2 % (ref 0–0.5)
KETONES UR QL STRIP: NEGATIVE
LEUKOCYTE ESTERASE UR QL STRIP.AUTO: ABNORMAL
LYMPHOCYTES # BLD AUTO: 2.77 10*3/MM3 (ref 1–3)
LYMPHOCYTES NFR BLD AUTO: 48.3 % (ref 21–51)
MCH RBC QN AUTO: 29.9 PG (ref 27–33)
MCHC RBC AUTO-ENTMCNC: 33.3 G/DL (ref 33–37)
MCV RBC AUTO: 89.6 FL (ref 80–94)
MONOCYTES # BLD AUTO: 0.51 10*3/MM3 (ref 0.1–0.9)
MONOCYTES NFR BLD AUTO: 8.9 % (ref 0–10)
NEUTROPHILS # BLD AUTO: 2.3 10*3/MM3 (ref 1.4–6.5)
NEUTROPHILS NFR BLD AUTO: 40.1 % (ref 30–70)
NITRITE UR QL STRIP: NEGATIVE
OSMOLALITY SERPL CALC.SUM OF ELEC: 278.5 MOSM/KG (ref 273–305)
PH UR STRIP.AUTO: 8.5 [PH] (ref 5–8)
PLATELET # BLD AUTO: 125 10*3/MM3 (ref 130–400)
PMV BLD AUTO: 10.9 FL (ref 6–10)
POTASSIUM BLD-SCNC: 3.5 MMOL/L (ref 3.5–5.3)
PROT SERPL-MCNC: 6.8 G/DL (ref 6–8)
PROT UR QL STRIP: NEGATIVE
RBC # BLD AUTO: 4.25 10*6/MM3 (ref 4.2–5.4)
RBC # UR: ABNORMAL /HPF
REF LAB TEST METHOD: ABNORMAL
SODIUM BLD-SCNC: 141 MMOL/L (ref 135–153)
SP GR UR STRIP: 1.02 (ref 1–1.03)
SQUAMOUS #/AREA URNS HPF: ABNORMAL /HPF
UROBILINOGEN UR QL STRIP: ABNORMAL
WBC NRBC COR # BLD: 5.73 10*3/MM3 (ref 4.5–12.5)
WBC UR QL AUTO: ABNORMAL /HPF

## 2017-10-31 PROCEDURE — 85025 COMPLETE CBC W/AUTO DIFF WBC: CPT | Performed by: FAMILY MEDICINE

## 2017-10-31 PROCEDURE — 96361 HYDRATE IV INFUSION ADD-ON: CPT

## 2017-10-31 PROCEDURE — 80053 COMPREHEN METABOLIC PANEL: CPT | Performed by: FAMILY MEDICINE

## 2017-10-31 PROCEDURE — 25010000002 HEPARIN FLUSH (PORCINE) 100 UNIT/ML SOLUTION: Performed by: FAMILY MEDICINE

## 2017-10-31 PROCEDURE — 25010000002 HYDROMORPHONE PER 4 MG: Performed by: FAMILY MEDICINE

## 2017-10-31 PROCEDURE — 25010000002 BUTORPHANOL PER 1 MG: Performed by: FAMILY MEDICINE

## 2017-10-31 PROCEDURE — 96374 THER/PROPH/DIAG INJ IV PUSH: CPT

## 2017-10-31 PROCEDURE — 74176 CT ABD & PELVIS W/O CONTRAST: CPT | Performed by: RADIOLOGY

## 2017-10-31 PROCEDURE — 74176 CT ABD & PELVIS W/O CONTRAST: CPT

## 2017-10-31 PROCEDURE — 96375 TX/PRO/DX INJ NEW DRUG ADDON: CPT

## 2017-10-31 PROCEDURE — 81001 URINALYSIS AUTO W/SCOPE: CPT | Performed by: FAMILY MEDICINE

## 2017-10-31 PROCEDURE — 99283 EMERGENCY DEPT VISIT LOW MDM: CPT

## 2017-10-31 PROCEDURE — 25010000002 PROMETHAZINE PER 50 MG: Performed by: FAMILY MEDICINE

## 2017-10-31 RX ORDER — SODIUM CHLORIDE 0.9 % (FLUSH) 0.9 %
10 SYRINGE (ML) INJECTION AS NEEDED
Status: DISCONTINUED | OUTPATIENT
Start: 2017-10-31 | End: 2017-10-31 | Stop reason: HOSPADM

## 2017-10-31 RX ORDER — HYDROMORPHONE HYDROCHLORIDE 1 MG/ML
0.5 INJECTION, SOLUTION INTRAMUSCULAR; INTRAVENOUS; SUBCUTANEOUS ONCE
Status: COMPLETED | OUTPATIENT
Start: 2017-10-31 | End: 2017-10-31

## 2017-10-31 RX ADMIN — PROMETHAZINE HYDROCHLORIDE 12.5 MG: 25 INJECTION INTRAMUSCULAR; INTRAVENOUS at 01:39

## 2017-10-31 RX ADMIN — HYDROMORPHONE HYDROCHLORIDE 0.5 MG: 1 INJECTION, SOLUTION INTRAMUSCULAR; INTRAVENOUS; SUBCUTANEOUS at 03:07

## 2017-10-31 RX ADMIN — HEPARIN SODIUM (PORCINE) LOCK FLUSH IV SOLN 100 UNIT/ML 300 UNITS: 100 SOLUTION at 03:09

## 2017-10-31 RX ADMIN — SODIUM CHLORIDE 1000 ML: 9 INJECTION, SOLUTION INTRAVENOUS at 01:35

## 2017-10-31 RX ADMIN — BUTORPHANOL TARTRATE 2 MG: 2 INJECTION, SOLUTION INTRAMUSCULAR; INTRAVENOUS at 01:36

## 2017-11-09 ENCOUNTER — HOSPITAL ENCOUNTER (EMERGENCY)
Facility: HOSPITAL | Age: 42
Discharge: HOME | End: 2017-11-09
Attending: EMERGENCY MEDICINE | Admitting: EMERGENCY MEDICINE

## 2017-11-09 ENCOUNTER — APPOINTMENT (OUTPATIENT)
Dept: GENERAL RADIOLOGY | Facility: HOSPITAL | Age: 42
End: 2017-11-09

## 2017-11-09 VITALS
DIASTOLIC BLOOD PRESSURE: 63 MMHG | HEIGHT: 69 IN | RESPIRATION RATE: 17 BRPM | TEMPERATURE: 97.4 F | WEIGHT: 163 LBS | OXYGEN SATURATION: 99 % | HEART RATE: 67 BPM | BODY MASS INDEX: 24.14 KG/M2 | SYSTOLIC BLOOD PRESSURE: 127 MMHG

## 2017-11-09 DIAGNOSIS — R51.9 SINUS HEADACHE: Primary | ICD-10-CM

## 2017-11-09 DIAGNOSIS — G43.009 MIGRAINE WITHOUT AURA AND WITHOUT STATUS MIGRAINOSUS, NOT INTRACTABLE: ICD-10-CM

## 2017-11-09 LAB
6-ACETYL MORPHINE: NEGATIVE
ALBUMIN SERPL-MCNC: 4.1 G/DL (ref 3.5–5)
ALBUMIN/GLOB SERPL: 1.5 G/DL (ref 1.5–2.5)
ALP SERPL-CCNC: 73 U/L (ref 35–104)
ALT SERPL W P-5'-P-CCNC: 15 U/L (ref 10–36)
AMPHET+METHAMPHET UR QL: NEGATIVE
ANION GAP SERPL CALCULATED.3IONS-SCNC: 5.8 MMOL/L (ref 3.6–11.2)
AST SERPL-CCNC: 28 U/L (ref 10–30)
BACTERIA UR QL AUTO: ABNORMAL /HPF
BARBITURATES UR QL SCN: NEGATIVE
BASOPHILS # BLD AUTO: 0.01 10*3/MM3 (ref 0–0.3)
BASOPHILS NFR BLD AUTO: 0.2 % (ref 0–2)
BENZODIAZ UR QL SCN: NEGATIVE
BILIRUB SERPL-MCNC: 0.5 MG/DL (ref 0.2–1.8)
BILIRUB UR QL STRIP: NEGATIVE
BUN BLD-MCNC: 10 MG/DL (ref 7–21)
BUN/CREAT SERPL: 15.9 (ref 7–25)
BUPRENORPHINE SERPL-MCNC: NEGATIVE NG/ML
CALCIUM SPEC-SCNC: 8.8 MG/DL (ref 7.7–10)
CANNABINOIDS SERPL QL: POSITIVE
CHLORIDE SERPL-SCNC: 109 MMOL/L (ref 99–112)
CLARITY UR: CLEAR
CO2 SERPL-SCNC: 26.2 MMOL/L (ref 24.3–31.9)
COCAINE UR QL: NEGATIVE
COLOR UR: YELLOW
CREAT BLD-MCNC: 0.63 MG/DL (ref 0.43–1.29)
DEPRECATED RDW RBC AUTO: 42.2 FL (ref 37–54)
EOSINOPHIL # BLD AUTO: 0.07 10*3/MM3 (ref 0–0.7)
EOSINOPHIL NFR BLD AUTO: 1.1 % (ref 0–5)
ERYTHROCYTE [DISTWIDTH] IN BLOOD BY AUTOMATED COUNT: 13.1 % (ref 11.5–14.5)
GFR SERPL CREATININE-BSD FRML MDRD: 104 ML/MIN/1.73
GLOBULIN UR ELPH-MCNC: 2.7 GM/DL
GLUCOSE BLD-MCNC: 89 MG/DL (ref 70–110)
GLUCOSE UR STRIP-MCNC: NEGATIVE MG/DL
HCT VFR BLD AUTO: 40 % (ref 37–47)
HGB BLD-MCNC: 13.2 G/DL (ref 12–16)
HGB UR QL STRIP.AUTO: NEGATIVE
HYALINE CASTS UR QL AUTO: ABNORMAL /LPF
IMM GRANULOCYTES # BLD: 0.01 10*3/MM3 (ref 0–0.03)
IMM GRANULOCYTES NFR BLD: 0.2 % (ref 0–0.5)
KETONES UR QL STRIP: NEGATIVE
LEUKOCYTE ESTERASE UR QL STRIP.AUTO: ABNORMAL
LYMPHOCYTES # BLD AUTO: 2.98 10*3/MM3 (ref 1–3)
LYMPHOCYTES NFR BLD AUTO: 46.8 % (ref 21–51)
MCH RBC QN AUTO: 29.6 PG (ref 27–33)
MCHC RBC AUTO-ENTMCNC: 33 G/DL (ref 33–37)
MCV RBC AUTO: 89.7 FL (ref 80–94)
METHADONE UR QL SCN: NEGATIVE
MONOCYTES # BLD AUTO: 0.7 10*3/MM3 (ref 0.1–0.9)
MONOCYTES NFR BLD AUTO: 11 % (ref 0–10)
NEUTROPHILS # BLD AUTO: 2.6 10*3/MM3 (ref 1.4–6.5)
NEUTROPHILS NFR BLD AUTO: 40.7 % (ref 30–70)
NITRITE UR QL STRIP: NEGATIVE
OPIATES UR QL: NEGATIVE
OSMOLALITY SERPL CALC.SUM OF ELEC: 279.8 MOSM/KG (ref 273–305)
OXYCODONE UR QL SCN: NEGATIVE
PCP UR QL SCN: NEGATIVE
PH UR STRIP.AUTO: 5.5 [PH] (ref 5–8)
PLATELET # BLD AUTO: 124 10*3/MM3 (ref 130–400)
PMV BLD AUTO: 11.2 FL (ref 6–10)
POTASSIUM BLD-SCNC: 3.5 MMOL/L (ref 3.5–5.3)
PROT SERPL-MCNC: 6.8 G/DL (ref 6–8)
PROT UR QL STRIP: NEGATIVE
RBC # BLD AUTO: 4.46 10*6/MM3 (ref 4.2–5.4)
RBC # UR: ABNORMAL /HPF
REF LAB TEST METHOD: ABNORMAL
SODIUM BLD-SCNC: 141 MMOL/L (ref 135–153)
SP GR UR STRIP: 1.01 (ref 1–1.03)
SQUAMOUS #/AREA URNS HPF: ABNORMAL /HPF
UROBILINOGEN UR QL STRIP: ABNORMAL
WBC NRBC COR # BLD: 6.37 10*3/MM3 (ref 4.5–12.5)
WBC UR QL AUTO: ABNORMAL /HPF

## 2017-11-09 PROCEDURE — 81001 URINALYSIS AUTO W/SCOPE: CPT | Performed by: PHYSICIAN ASSISTANT

## 2017-11-09 PROCEDURE — 87086 URINE CULTURE/COLONY COUNT: CPT | Performed by: PHYSICIAN ASSISTANT

## 2017-11-09 PROCEDURE — 80307 DRUG TEST PRSMV CHEM ANLYZR: CPT | Performed by: PHYSICIAN ASSISTANT

## 2017-11-09 PROCEDURE — 71020 HC CHEST PA AND LATERAL: CPT

## 2017-11-09 PROCEDURE — 71020 XR CHEST 2 VW: CPT | Performed by: RADIOLOGY

## 2017-11-09 PROCEDURE — 25010000002 HEPARIN FLUSH (PORCINE) 100 UNIT/ML SOLUTION: Performed by: PHYSICIAN ASSISTANT

## 2017-11-09 PROCEDURE — 25010000002 BUTORPHANOL PER 1 MG: Performed by: PHYSICIAN ASSISTANT

## 2017-11-09 PROCEDURE — 99283 EMERGENCY DEPT VISIT LOW MDM: CPT

## 2017-11-09 PROCEDURE — 85025 COMPLETE CBC W/AUTO DIFF WBC: CPT | Performed by: PHYSICIAN ASSISTANT

## 2017-11-09 PROCEDURE — 96374 THER/PROPH/DIAG INJ IV PUSH: CPT

## 2017-11-09 PROCEDURE — 80053 COMPREHEN METABOLIC PANEL: CPT | Performed by: PHYSICIAN ASSISTANT

## 2017-11-09 PROCEDURE — 96372 THER/PROPH/DIAG INJ SC/IM: CPT

## 2017-11-09 PROCEDURE — 25010000002 PROMETHAZINE PER 50 MG: Performed by: PHYSICIAN ASSISTANT

## 2017-11-09 RX ORDER — SODIUM CHLORIDE 0.9 % (FLUSH) 0.9 %
10 SYRINGE (ML) INJECTION AS NEEDED
Status: DISCONTINUED | OUTPATIENT
Start: 2017-11-09 | End: 2017-11-09 | Stop reason: HOSPADM

## 2017-11-09 RX ORDER — PROMETHAZINE HYDROCHLORIDE 25 MG/ML
25 INJECTION, SOLUTION INTRAMUSCULAR; INTRAVENOUS ONCE
Status: COMPLETED | OUTPATIENT
Start: 2017-11-09 | End: 2017-11-09

## 2017-11-09 RX ADMIN — BUTORPHANOL TARTRATE 4 MG: 2 INJECTION, SOLUTION INTRAMUSCULAR; INTRAVENOUS at 12:15

## 2017-11-09 RX ADMIN — PROMETHAZINE HYDROCHLORIDE 25 MG: 25 INJECTION INTRAMUSCULAR; INTRAVENOUS at 12:16

## 2017-11-09 RX ADMIN — HYDROCODONE POLISTIREX AND CHLORPHENIRAMINE POLISTIREX 5 ML: 10; 8 SUSPENSION, EXTENDED RELEASE ORAL at 12:53

## 2017-11-09 RX ADMIN — SODIUM CHLORIDE, PRESERVATIVE FREE 300 UNITS: 5 INJECTION INTRAVENOUS at 12:54

## 2017-11-09 NOTE — ED NOTES
"Pt stated she \"got ketamine at [facility] for her migraines\"     Obdulio Flores, D[place]ON  11/09/17 0232    "

## 2017-11-09 NOTE — ED PROVIDER NOTES
Subjective   HPI Comments: This is a 42-year-old female comes in with multiple complaints of cough, congestion, headache for the past several days.  Patient does report history of migraine headaches.  She describes the pain as sharp, stabbing lateral temporal region.  Patient denies any associated fever, chills, body aches.    Patient is a 42 y.o. female presenting with migraines.   History provided by:  Patient   used: No    Migraine   Pain location:  Generalized  Quality:  Sharp and dull  Severity at highest:  8/10  Onset quality:  Sudden  Duration:  2 days  Timing:  Constant  Chronicity:  New  Similar to prior headaches: no    Context: not activity, not exposure to bright light, not defecating, not eating, not intercourse and not loud noise    Relieved by:  Nothing  Worsened by:  Nothing  Associated symptoms: congestion and URI    Associated symptoms: no abdominal pain, no back pain, no blurred vision, no fatigue, no near-syncope, no neck pain, no neck stiffness, no sore throat, no swollen glands, no syncope and no tingling    Risk factors: no anger, no family hx of SAH and does not have insomnia        Review of Systems   Constitutional: Negative for fatigue.   HENT: Positive for congestion. Negative for sore throat.    Eyes: Negative for blurred vision.   Cardiovascular: Negative for syncope and near-syncope.   Gastrointestinal: Negative for abdominal pain.   Musculoskeletal: Negative for back pain, neck pain and neck stiffness.   Neurological: Positive for headaches.   All other systems reviewed and are negative.      Past Medical History:   Diagnosis Date   • Abdominal pain    • Abdominal swelling    • Hoyos's disease    • Bipolar 1 disorder    • Brain tumor     R Frontal Lobe per pt   • Brain tumor 2014   • Brain tumor (benign)    • Constipation    • DDD (degenerative disc disease), cervical 05/29/2017   • DDD (degenerative disc disease), cervical    • Diarrhea    • Fibromyalgia    • IBS  (irritable bowel syndrome)    • Migraine    • Nausea & vomiting    • PONV (postoperative nausea and vomiting)    • PTSD (post-traumatic stress disorder)    • Rectal bleeding        Allergies   Allergen Reactions   • Ativan [Lorazepam] Hallucinations     confusion   • Sulfa Antibiotics Shortness Of Breath and Swelling   • Compazine [Prochlorperazine Edisylate] Hives   • Demerol [Meperidine] Hives   • Droperidol Itching   • Metoclopramide Swelling   • Toradol [Ketorolac Tromethamine] Hives and Itching   • Zofran [Ondansetron Hcl] Rash       Past Surgical History:   Procedure Laterality Date   • ANAL SCOPE N/A 7/28/2016    Procedure: ANAL SCOPE;  Surgeon: Kael Lopez MD;  Location: [facility] OR;  Service:    • APPENDECTOMY     • COLONOSCOPY N/A 6/30/2016    Procedure: COLONOSCOPY  CPTCODE:63186;  Surgeon: Jose Antonio Belle III, MD;  Location: [facility] OR;  Service:    • COLONOSCOPY N/A 7/7/2016    Procedure: COLONOSCOPY (47858) CPT;  Surgeon: Jose Antonio Belle III, MD;  Location: [facility] OR;  Service:    • CYSTOSCOPY RETROGRADE PYELOGRAM Bilateral 4/28/2017    Procedure: CYSTOSCOPY RETROGRADE PYELOGRAM;  Surgeon: Mu Blunt MD;  Location: [facility] OR;  Service:    • ENDOSCOPY N/A 6/30/2016    Procedure: ESOPHAGOGASTRODUODENOSCOPY WITH BIOPSY  CPTCODE:04500;  Surgeon: Jose Antonio Belle III, MD;  Location: [facility] OR;  Service:    • HEMORRHOIDECTOMY N/A 7/28/2016    Procedure: HEMORRHOID STAPLING;  Surgeon: Kael Lopez MD;  Location: [facility] OR;  Service:    • HYSTERECTOMY     • KNEE SURGERY     • LAPAROSCOPIC SALPINGOOPHERECTOMY     • PORTACATH PLACEMENT N/A 7/28/2017    Procedure: INSERTION OF PORTACATH;  Surgeon: Celso Arredondo MD;  Location: [facility] OR;  Service:    • SHOULDER SURGERY      3 times       Family History   Problem Relation Age of Onset   • Crohn's disease Other    • Hypertension Other    • Diabetes Other    • Irritable bowel syndrome Other        Social History     Social History    • Marital status:      Spouse name: N/A   • Number of children: N/A   • Years of education: N/A     Social History Main Topics   • Smoking status: Former Smoker     Packs/day: 1.00     Years: 20.00     Quit date: 11/1/2015   • Smokeless tobacco: Never Used   • Alcohol use No   • Drug use: Yes     Special: Marijuana   • Sexual activity: Defer     Other Topics Concern   • None     Social History Narrative   • None           Objective   Physical Exam   Constitutional: She is oriented to person, place, and time. She appears well-developed and well-nourished.   HENT:   Head: Normocephalic. Head is without Salazar's sign, without abrasion and without contusion. Hair is normal.   Right Ear: External ear normal.   Left Ear: External ear normal.   Nose: Nose normal.   Mouth/Throat: Oropharynx is clear and moist.   Eyes: Conjunctivae and EOM are normal. Pupils are equal, round, and reactive to light.   Neck: Normal range of motion. Rigidity present. No tracheal deviation and no erythema present. No Brudzinski's sign and no Kernig's sign noted. No thyromegaly present.   Cardiovascular: Normal rate, regular rhythm, normal heart sounds and intact distal pulses.    Pulmonary/Chest: Effort normal and breath sounds normal.   Abdominal: Soft. Bowel sounds are normal.   Musculoskeletal: Normal range of motion.   Neurological: She is alert and oriented to person, place, and time. She has normal reflexes.   Skin: Skin is warm and dry.   Psychiatric: She has a normal mood and affect. Her behavior is normal. Judgment and thought content normal.   Nursing note and vitals reviewed.      Procedures         ED Course  ED Course   Comment By Time   42-year-old female comes in with chief complaint sinus congestion, cough for the past several days.  Patient also reports Migraine Headache. She does state that she feels better at this time. Advised to return if condition worsens. Mark Stubbs PA-C 11/09 6986                   MDM  Number of Diagnoses or Management Options  Migraine without aura and without status migrainosus, not intractable: new and requires workup  Sinus headache: new and requires workup     Amount and/or Complexity of Data Reviewed  Clinical lab tests: reviewed and ordered  Tests in the radiology section of CPT®: ordered and reviewed  Independent visualization of images, tracings, or specimens: yes    Risk of Complications, Morbidity, and/or Mortality  Presenting problems: moderate  Diagnostic procedures: moderate  Management options: moderate    Patient Progress  Patient progress: stable      Final diagnoses:   Sinus headache   Migraine without aura and without status migrainosus, not intractable            Mark Stubbs PA-C  11/09/17 9827

## 2017-11-11 LAB — BACTERIA SPEC AEROBE CULT: NORMAL

## 2017-11-15 ENCOUNTER — TELEPHONE (OUTPATIENT)
Dept: GASTROENTEROLOGY | Facility: CLINIC | Age: 42
End: 2017-11-15

## 2017-11-22 NOTE — TELEPHONE ENCOUNTER
Does this patient need new case request? I see there is already one placed by Anabell at her last visit?

## 2017-11-26 ENCOUNTER — HOSPITAL ENCOUNTER (EMERGENCY)
Facility: HOSPITAL | Age: 42
Discharge: HOME | End: 2017-11-27
Attending: EMERGENCY MEDICINE | Admitting: EMERGENCY MEDICINE

## 2017-11-26 DIAGNOSIS — G89.29 LEFT FLANK PAIN, CHRONIC: Primary | ICD-10-CM

## 2017-11-26 DIAGNOSIS — R31.0 GROSS HEMATURIA: ICD-10-CM

## 2017-11-26 DIAGNOSIS — R10.9 LEFT FLANK PAIN, CHRONIC: Primary | ICD-10-CM

## 2017-11-26 PROCEDURE — 81001 URINALYSIS AUTO W/SCOPE: CPT | Performed by: PHYSICIAN ASSISTANT

## 2017-11-26 PROCEDURE — 96375 TX/PRO/DX INJ NEW DRUG ADDON: CPT

## 2017-11-26 PROCEDURE — 87086 URINE CULTURE/COLONY COUNT: CPT | Performed by: PHYSICIAN ASSISTANT

## 2017-11-26 PROCEDURE — 96374 THER/PROPH/DIAG INJ IV PUSH: CPT

## 2017-11-26 PROCEDURE — 96361 HYDRATE IV INFUSION ADD-ON: CPT

## 2017-11-26 PROCEDURE — 99283 EMERGENCY DEPT VISIT LOW MDM: CPT

## 2017-11-26 RX ORDER — SODIUM CHLORIDE 0.9 % (FLUSH) 0.9 %
10 SYRINGE (ML) INJECTION AS NEEDED
Status: DISCONTINUED | OUTPATIENT
Start: 2017-11-26 | End: 2017-11-27 | Stop reason: HOSPADM

## 2017-11-26 RX ORDER — PROMETHAZINE HYDROCHLORIDE 25 MG/ML
12.5 INJECTION, SOLUTION INTRAMUSCULAR; INTRAVENOUS ONCE
Status: COMPLETED | OUTPATIENT
Start: 2017-11-26 | End: 2017-11-27

## 2017-11-27 VITALS
RESPIRATION RATE: 18 BRPM | SYSTOLIC BLOOD PRESSURE: 112 MMHG | HEIGHT: 69 IN | OXYGEN SATURATION: 99 % | BODY MASS INDEX: 24.14 KG/M2 | HEART RATE: 69 BPM | DIASTOLIC BLOOD PRESSURE: 74 MMHG | TEMPERATURE: 99.2 F | WEIGHT: 163 LBS

## 2017-11-27 LAB
ALBUMIN SERPL-MCNC: 4.2 G/DL (ref 3.5–5)
ALBUMIN/GLOB SERPL: 1.6 G/DL (ref 1.5–2.5)
ALP SERPL-CCNC: 77 U/L (ref 35–104)
ALT SERPL W P-5'-P-CCNC: 53 U/L (ref 10–36)
ANION GAP SERPL CALCULATED.3IONS-SCNC: 5.8 MMOL/L (ref 3.6–11.2)
AST SERPL-CCNC: 57 U/L (ref 10–30)
BACTERIA UR QL AUTO: ABNORMAL /HPF
BASOPHILS # BLD AUTO: 0.01 10*3/MM3 (ref 0–0.3)
BASOPHILS NFR BLD AUTO: 0.2 % (ref 0–2)
BILIRUB SERPL-MCNC: 0.7 MG/DL (ref 0.2–1.8)
BILIRUB UR QL STRIP: NEGATIVE
BUN BLD-MCNC: 9 MG/DL (ref 7–21)
BUN/CREAT SERPL: 13.2 (ref 7–25)
CALCIUM SPEC-SCNC: 9.3 MG/DL (ref 7.7–10)
CHLORIDE SERPL-SCNC: 106 MMOL/L (ref 99–112)
CLARITY UR: ABNORMAL
CO2 SERPL-SCNC: 28.2 MMOL/L (ref 24.3–31.9)
COLOR UR: ABNORMAL
CREAT BLD-MCNC: 0.68 MG/DL (ref 0.43–1.29)
DEPRECATED RDW RBC AUTO: 41.6 FL (ref 37–54)
EOSINOPHIL # BLD AUTO: 0.11 10*3/MM3 (ref 0–0.7)
EOSINOPHIL NFR BLD AUTO: 2.4 % (ref 0–5)
ERYTHROCYTE [DISTWIDTH] IN BLOOD BY AUTOMATED COUNT: 13 % (ref 11.5–14.5)
GFR SERPL CREATININE-BSD FRML MDRD: 95 ML/MIN/1.73
GLOBULIN UR ELPH-MCNC: 2.7 GM/DL
GLUCOSE BLD-MCNC: 91 MG/DL (ref 70–110)
GLUCOSE UR STRIP-MCNC: NEGATIVE MG/DL
HCT VFR BLD AUTO: 39.6 % (ref 37–47)
HGB BLD-MCNC: 12.9 G/DL (ref 12–16)
HGB UR QL STRIP.AUTO: ABNORMAL
HYALINE CASTS UR QL AUTO: ABNORMAL /LPF
IMM GRANULOCYTES # BLD: 0.01 10*3/MM3 (ref 0–0.03)
IMM GRANULOCYTES NFR BLD: 0.2 % (ref 0–0.5)
KETONES UR QL STRIP: NEGATIVE
LEUKOCYTE ESTERASE UR QL STRIP.AUTO: ABNORMAL
LYMPHOCYTES # BLD AUTO: 2.28 10*3/MM3 (ref 1–3)
LYMPHOCYTES NFR BLD AUTO: 49 % (ref 21–51)
MCH RBC QN AUTO: 29.1 PG (ref 27–33)
MCHC RBC AUTO-ENTMCNC: 32.6 G/DL (ref 33–37)
MCV RBC AUTO: 89.2 FL (ref 80–94)
MONOCYTES # BLD AUTO: 0.39 10*3/MM3 (ref 0.1–0.9)
MONOCYTES NFR BLD AUTO: 8.4 % (ref 0–10)
NEUTROPHILS # BLD AUTO: 1.85 10*3/MM3 (ref 1.4–6.5)
NEUTROPHILS NFR BLD AUTO: 39.8 % (ref 30–70)
NITRITE UR QL STRIP: NEGATIVE
OSMOLALITY SERPL CALC.SUM OF ELEC: 277.7 MOSM/KG (ref 273–305)
PH UR STRIP.AUTO: 8 [PH] (ref 5–8)
PLATELET # BLD AUTO: 116 10*3/MM3 (ref 130–400)
PMV BLD AUTO: 10.8 FL (ref 6–10)
POTASSIUM BLD-SCNC: 3.7 MMOL/L (ref 3.5–5.3)
PROT SERPL-MCNC: 6.9 G/DL (ref 6–8)
PROT UR QL STRIP: NEGATIVE
RBC # BLD AUTO: 4.44 10*6/MM3 (ref 4.2–5.4)
RBC # UR: ABNORMAL /HPF
REF LAB TEST METHOD: ABNORMAL
SODIUM BLD-SCNC: 140 MMOL/L (ref 135–153)
SP GR UR STRIP: 1.01 (ref 1–1.03)
SQUAMOUS #/AREA URNS HPF: ABNORMAL /HPF
UROBILINOGEN UR QL STRIP: ABNORMAL
WBC NRBC COR # BLD: 4.65 10*3/MM3 (ref 4.5–12.5)
WBC UR QL AUTO: ABNORMAL /HPF

## 2017-11-27 PROCEDURE — 96374 THER/PROPH/DIAG INJ IV PUSH: CPT

## 2017-11-27 PROCEDURE — 96361 HYDRATE IV INFUSION ADD-ON: CPT

## 2017-11-27 PROCEDURE — 85025 COMPLETE CBC W/AUTO DIFF WBC: CPT | Performed by: PHYSICIAN ASSISTANT

## 2017-11-27 PROCEDURE — 96375 TX/PRO/DX INJ NEW DRUG ADDON: CPT

## 2017-11-27 PROCEDURE — 25010000002 PROMETHAZINE PER 50 MG: Performed by: PHYSICIAN ASSISTANT

## 2017-11-27 PROCEDURE — 80053 COMPREHEN METABOLIC PANEL: CPT | Performed by: PHYSICIAN ASSISTANT

## 2017-11-27 PROCEDURE — 25010000002 BUTORPHANOL PER 1 MG: Performed by: EMERGENCY MEDICINE

## 2017-11-27 RX ADMIN — BUTORPHANOL TARTRATE 2 MG: 2 INJECTION, SOLUTION INTRAMUSCULAR; INTRAVENOUS at 00:06

## 2017-11-27 RX ADMIN — PROMETHAZINE HYDROCHLORIDE 12.5 MG: 25 INJECTION INTRAMUSCULAR; INTRAVENOUS at 00:06

## 2017-11-27 RX ADMIN — SODIUM CHLORIDE 1000 ML: 9 INJECTION, SOLUTION INTRAVENOUS at 00:06

## 2017-11-28 ENCOUNTER — OFFICE VISIT (OUTPATIENT)
Dept: UROLOGY | Facility: CLINIC | Age: 42
End: 2017-11-28

## 2017-11-28 VITALS — HEIGHT: 69 IN | WEIGHT: 163 LBS | BODY MASS INDEX: 24.14 KG/M2

## 2017-11-28 DIAGNOSIS — T83.84XD PAIN DUE TO URETERAL STENT, SUBSEQUENT ENCOUNTER: ICD-10-CM

## 2017-11-28 DIAGNOSIS — Z48.816 AFTERCARE FOLLOWING SURGERY OF THE GENITOURINARY SYSTEM: Primary | ICD-10-CM

## 2017-11-28 DIAGNOSIS — IMO0002 URETHRAL STENOSIS: ICD-10-CM

## 2017-11-28 DIAGNOSIS — R39.89 BLADDER PAIN: ICD-10-CM

## 2017-11-28 PROCEDURE — 53661 DILATION OF URETHRA: CPT | Performed by: UROLOGY

## 2017-11-28 PROCEDURE — 99213 OFFICE O/P EST LOW 20 MIN: CPT | Performed by: UROLOGY

## 2017-11-28 PROCEDURE — 96372 THER/PROPH/DIAG INJ SC/IM: CPT | Performed by: UROLOGY

## 2017-11-28 RX ORDER — OXYCODONE AND ACETAMINOPHEN 10; 325 MG/1; MG/1
1 TABLET ORAL EVERY 6 HOURS PRN
Qty: 12 TABLET | Refills: 0 | Status: SHIPPED | OUTPATIENT
Start: 2017-11-28 | End: 2018-03-22 | Stop reason: HOSPADM

## 2017-11-28 RX ORDER — GENTAMICIN SULFATE 40 MG/ML
80 INJECTION, SOLUTION INTRAMUSCULAR; INTRAVENOUS ONCE
Status: COMPLETED | OUTPATIENT
Start: 2017-11-28 | End: 2017-11-28

## 2017-11-28 RX ADMIN — GENTAMICIN SULFATE 80 MG: 40 INJECTION, SOLUTION INTRAMUSCULAR; INTRAVENOUS at 16:26

## 2017-11-28 NOTE — PROGRESS NOTES
Chief Complaint:          Chief Complaint   Patient presents with   • Urethral Stenosis     Dilation       HPI:   42 y.o. female.  32 Year old white female. Accompanied by  with IgA nephropathy, hepatitis C, severe urethral stenosis who presents for urethral dilation.  She is desirous of more pain medication but I explained to her carefully as long as she is on hydrocodone and I will not give her any more pain medication.  And if she's not on it she can only have several pills around the time of her dilation.  I also explained to her there is no permanent fix of the situation other than self dilatation which she refuses to do.Today she wants to learn to do intermittent catheter.  She gets pain medication from her primary care provider and she will be given pain medication only for short supply after painful dilatation this is been discussed with her primary care physician he will have dilatation no more than every 6 weeks in this office           Past Medical History:        Past Medical History:   Diagnosis Date   • Abdominal pain    • Abdominal swelling    • Hoyos's disease    • Bipolar 1 disorder    • Brain tumor     R Frontal Lobe per pt   • Brain tumor 2014   • Brain tumor (benign)    • Constipation    • DDD (degenerative disc disease), cervical 05/29/2017   • DDD (degenerative disc disease), cervical    • Diarrhea    • Fibromyalgia    • IBS (irritable bowel syndrome)    • Migraine    • Nausea & vomiting    • PONV (postoperative nausea and vomiting)    • PTSD (post-traumatic stress disorder)    • Rectal bleeding          Current Meds:     Current Outpatient Prescriptions   Medication Sig Dispense Refill   • azelastine (ASTEPRO) 0.15 % solution nasal spray 2 sprays into each nostril 2 (Two) Times a Day. 30 mL 0   • benzonatate (TESSALON) 200 MG capsule Take 1 capsule by mouth 3 (Three) Times a Day As Needed for Cough. 30 capsule 0   • busPIRone (BUSPAR) 15 MG tablet Take 15 mg by mouth 3 (three) times a  day        • butorphanol (STADOL) 10 MG/ML nasal spray 1 spray into each nostril Daily.     • carbidopa-levodopa (SINEMET)  MG per tablet Take 1 tablet by mouth 3 (Three) Times a Day.     • cetirizine (zyrTEC) 10 MG tablet Take 1 tablet by mouth Daily. 30 tablet 0   • divalproex (DEPAKOTE) 500 MG DR tablet Take 500 mg by mouth 2 (Two) Times a Day.     • famotidine (PEPCID) 40 MG tablet Take 40 mg by mouth 2 (Two) Times a Day As Needed for heartburn.     • guaiFENesin (MUCINEX) 600 MG 12 hr tablet Take 2 tablets by mouth 2 (Two) Times a Day. 20 tablet 0   • HYDROcodone-acetaminophen (NORCO) 7.5-325 MG per tablet Take 1 tablet by mouth Every 6 (Six) Hours As Needed for Moderate Pain (4-6).     • oxybutynin (DITROPAN) 5 MG tablet Take 1 tablet by mouth 3 (Three) Times a Day. 90 tablet 11   • oxyCODONE-acetaminophen (PERCOCET)  MG per tablet Take 1 tablet by mouth Every 6 (Six) Hours As Needed for Moderate Pain . 12 tablet 0   • oxyCODONE-acetaminophen (PERCOCET) 5-325 MG per tablet 1 to 2 Tablets Every 6 Hours as needed for PAIN 20 tablet 0   • oxyCODONE-acetaminophen (PERCOCET) 5-325 MG per tablet 1 to 2 Tablets Every 6 Hours as needed for PAIN 20 tablet 0   • pantoprazole (PROTONIX) 40 MG EC tablet Take 40 mg by mouth Daily As Needed.     • polyethylene glycol (GoLYTELY) 236 g solution Starting at 6 pm on day prior to procedure, drink 8 ounces every 15 minutes until all gone or stools are clear. May add flavor packet. 4000 mL 0   • potassium chloride (K-DUR) 10 MEQ CR tablet Take 1 tablet by mouth Daily. 12 tablet 0   • promethazine (PHENERGAN) 25 MG tablet Take 1 tablet by mouth Every 6 (Six) Hours As Needed for Nausea or Vomiting. 30 tablet 0   • sertraline (ZOLOFT) 100 MG tablet Take 150 mg by mouth daily.     • SUMAtriptan (IMITREX) 6 MG/0.5ML solution injection Inject 6 mg under the skin 1 (One) Time.       No current facility-administered medications for this visit.         Allergies:      Allergies    Allergen Reactions   • Ativan [Lorazepam] Hallucinations     confusion   • Sulfa Antibiotics Shortness Of Breath and Swelling   • Compazine [Prochlorperazine Edisylate] Hives   • Demerol [Meperidine] Hives   • Droperidol Itching   • Metoclopramide Swelling   • Toradol [Ketorolac Tromethamine] Hives and Itching   • Zofran [Ondansetron Hcl] Rash        Past Surgical History:     Past Surgical History:   Procedure Laterality Date   • ANAL SCOPE N/A 7/28/2016    Procedure: ANAL SCOPE;  Surgeon: Kael Lopez MD;  Location: [facility] OR;  Service:    • APPENDECTOMY     • COLONOSCOPY N/A 6/30/2016    Procedure: COLONOSCOPY  CPTCODE:72191;  Surgeon: Jose Antonio Belle III, MD;  Location: [facility] OR;  Service:    • COLONOSCOPY N/A 7/7/2016    Procedure: COLONOSCOPY (59338) CPT;  Surgeon: Jose Antonio Belle III, MD;  Location: [facility] OR;  Service:    • CYSTOSCOPY RETROGRADE PYELOGRAM Bilateral 4/28/2017    Procedure: CYSTOSCOPY RETROGRADE PYELOGRAM;  Surgeon: Mu Blunt MD;  Location: [facility] OR;  Service:    • ENDOSCOPY N/A 6/30/2016    Procedure: ESOPHAGOGASTRODUODENOSCOPY WITH BIOPSY  CPTCODE:84273;  Surgeon: Jose Antonio Belle III, MD;  Location: [facility] OR;  Service:    • HEMORRHOIDECTOMY N/A 7/28/2016    Procedure: HEMORRHOID STAPLING;  Surgeon: Kael Lopez MD;  Location: [facility] OR;  Service:    • HYSTERECTOMY     • KNEE SURGERY     • LAPAROSCOPIC SALPINGOOPHERECTOMY     • PORTACATH PLACEMENT N/A 7/28/2017    Procedure: INSERTION OF PORTACATH;  Surgeon: Celso Arredondo MD;  Location: [facility] OR;  Service:    • SHOULDER SURGERY      3 times         Social History:     Social History     Social History   • Marital status:      Spouse name: N/A   • Number of children: N/A   • Years of education: N/A     Occupational History   • Not on file.     Social History Main Topics   • Smoking status: Former Smoker     Packs/day: 1.00     Years: 20.00     Quit date: 11/1/2015   • Smokeless tobacco: Never  Used   • Alcohol use No   • Drug use: Yes     Special: Marijuana   • Sexual activity: Defer     Other Topics Concern   • Not on file     Social History Narrative       Family History:     Family History   Problem Relation Age of Onset   • Crohn's disease Other    • Hypertension Other    • Diabetes Other    • Irritable bowel syndrome Other        Review of Systems:     Review of Systems   Constitutional: Negative.  Negative for activity change, appetite change, chills, diaphoresis, fatigue and unexpected weight change.   HENT: Negative for congestion, dental problem, drooling, ear discharge, ear pain, facial swelling, hearing loss, mouth sores, nosebleeds, postnasal drip, rhinorrhea, sinus pressure, sneezing, sore throat, tinnitus, trouble swallowing and voice change.    Eyes: Negative.  Negative for photophobia, pain, discharge, redness, itching and visual disturbance.   Respiratory: Negative.  Negative for apnea, cough, choking, chest tightness, shortness of breath, wheezing and stridor.    Cardiovascular: Negative.  Negative for chest pain, palpitations and leg swelling.   Gastrointestinal: Negative.  Negative for abdominal distention, abdominal pain, anal bleeding, blood in stool, constipation, diarrhea, nausea, rectal pain and vomiting.   Endocrine: Negative.  Negative for cold intolerance, heat intolerance, polydipsia, polyphagia and polyuria.   Genitourinary: Positive for difficulty urinating, dyspareunia, dysuria, pelvic pain and vaginal pain.   Musculoskeletal: Negative.  Negative for arthralgias, back pain, gait problem, joint swelling, myalgias, neck pain and neck stiffness.   Skin: Negative.  Negative for color change, pallor, rash and wound.   Allergic/Immunologic: Negative.  Negative for environmental allergies, food allergies and immunocompromised state.   Neurological: Negative.  Negative for dizziness, tremors, seizures, syncope, facial asymmetry, speech difficulty, weakness, light-headedness,  numbness and headaches.   Hematological: Negative.  Negative for adenopathy. Does not bruise/bleed easily.   Psychiatric/Behavioral: Negative for agitation, behavioral problems, confusion, decreased concentration, dysphoric mood, hallucinations, self-injury, sleep disturbance and suicidal ideas. The patient is not nervous/anxious and is not hyperactive.    All other systems reviewed and are negative.      Physical Exam:     Physical Exam   Constitutional: She appears well-developed and well-nourished.   HENT:   Head: Normocephalic and atraumatic.   Right Ear: External ear normal.   Left Ear: External ear normal.   Mouth/Throat: Oropharynx is clear and moist.   Eyes: Conjunctivae are normal. Pupils are equal, round, and reactive to light.   Cardiovascular: Normal rate, regular rhythm, normal heart sounds and intact distal pulses.    Pulmonary/Chest: Effort normal and breath sounds normal.   Abdominal: Soft. Bowel sounds are normal. She exhibits no distension and no mass. There is no tenderness. There is no rebound and no guarding.   Genitourinary: Vagina normal. No vaginal discharge found.   Musculoskeletal: Normal range of motion.   Neurological: She is alert. She has normal reflexes.   Skin: Skin is warm and dry.   Psychiatric: She has a normal mood and affect. Her behavior is normal. Judgment and thought content normal.       Procedure:    urethral dilation-after an appropriate informed consent the patient was brought to the procedure suite.  The urethra was gently anesthetized with 10 cc of 2% viscous Xylocaine jelly.  After an adequate period of topical anesthesia  There was an obvious stenosis present.     the urethra was gently anesthetized and dilated with Reese sounds from 16-26 Malay sequentially without complication the patient was given gentamicin as prophylaxis with 80 mg    Assessment:   No diagnosis found.  No orders of the defined types were placed in this encounter.      Plan:   Severe urethral  stenosis status post dilatation  Narcotic pain medication-patient has significant acute pain that I believe would be an indication for the use of narcotic pain medication.  I discussed the significant risks of pain medication and the fact that this will be a short only option and I will give her no more than a three-day supply of pain medication.  And I will not plan long-term medication and that this will be sent to a pain clinic if at all becomes necessary.  We discussed signing a pain medication agreement and the fact that we're going to run a state [facility] review to be sure the patient is not getting pain medication from elsewhere.  If this is the case we will not give pain medication.  As part of the patient's treatment plan of there being prescribed a controlled substance with potential for abuse.  This patient has been wait aware of the appropriate dose of such medications including, the risk for somnolence, limited ability to drive and/or safety and the significant potential for overdose.  It is been made clear that these medications are for the prescribed patient only without concomitant use of alcohol or other sepsis unless prescribed by the medical provider.  Has completed prescribing agreement detailing the terms of continue prescribing him a controlled substance.  Including monitoring Luis Alberto ports, the possibility of urine drug screens and pedal counts.  The patient is aware that we reviewed LUIS ALBERTO reports on a regular basis and scan them into the chart.  History and physical examination exhibited continued safe and appropriate use of controlled substances.  Also discussed the fact that the new [place] legislation allows only a three-day prescription for pain medication and Lasix in the face of chronic pain.  In this situation he will be referred to a chronic pain clinic.           This document has been electronically signed by VERENICE FINK MD November 28, 2017 3:33 PM

## 2017-11-29 LAB
BACTERIA SPEC AEROBE CULT: NORMAL
BACTERIA SPEC AEROBE CULT: NORMAL

## 2017-11-30 ENCOUNTER — TELEPHONE (OUTPATIENT)
Dept: GASTROENTEROLOGY | Facility: CLINIC | Age: 42
End: 2017-11-30

## 2017-11-30 NOTE — TELEPHONE ENCOUNTER
I called surgery and they said that she is not on their schedule and they do not have a case request. She is scheduled 01/04/2018 at 8:15.

## 2017-12-06 ENCOUNTER — HOSPITAL ENCOUNTER (OUTPATIENT)
Facility: HOSPITAL | Age: 42
Setting detail: HOSPITAL OUTPATIENT SURGERY
End: 2017-12-06
Attending: INTERNAL MEDICINE | Admitting: INTERNAL MEDICINE

## 2017-12-06 DIAGNOSIS — R11.2 NAUSEA AND VOMITING, INTRACTABILITY OF VOMITING NOT SPECIFIED, UNSPECIFIED VOMITING TYPE: Primary | ICD-10-CM

## 2017-12-06 DIAGNOSIS — K92.1 HEMATOCHEZIA: ICD-10-CM

## 2017-12-06 DIAGNOSIS — R10.84 GENERALIZED ABDOMINAL PAIN: ICD-10-CM

## 2017-12-10 ENCOUNTER — HOSPITAL ENCOUNTER (EMERGENCY)
Facility: HOSPITAL | Age: 42
Discharge: HOME | End: 2017-12-10
Attending: EMERGENCY MEDICINE | Admitting: EMERGENCY MEDICINE

## 2017-12-10 VITALS
DIASTOLIC BLOOD PRESSURE: 76 MMHG | RESPIRATION RATE: 18 BRPM | WEIGHT: 163 LBS | BODY MASS INDEX: 24.14 KG/M2 | HEART RATE: 70 BPM | TEMPERATURE: 98.4 F | OXYGEN SATURATION: 98 % | SYSTOLIC BLOOD PRESSURE: 122 MMHG | HEIGHT: 69 IN

## 2017-12-10 DIAGNOSIS — G43.009 MIGRAINE WITHOUT AURA AND WITHOUT STATUS MIGRAINOSUS, NOT INTRACTABLE: Primary | ICD-10-CM

## 2017-12-10 PROCEDURE — 96374 THER/PROPH/DIAG INJ IV PUSH: CPT

## 2017-12-10 PROCEDURE — 25010000002 BUTORPHANOL PER 1 MG

## 2017-12-10 PROCEDURE — 25010000002 PROMETHAZINE PER 50 MG

## 2017-12-10 PROCEDURE — 99283 EMERGENCY DEPT VISIT LOW MDM: CPT

## 2017-12-10 PROCEDURE — 96372 THER/PROPH/DIAG INJ SC/IM: CPT

## 2017-12-10 RX ORDER — PROMETHAZINE HYDROCHLORIDE 25 MG/ML
12.5 INJECTION, SOLUTION INTRAMUSCULAR; INTRAVENOUS ONCE
Status: COMPLETED | OUTPATIENT
Start: 2017-12-10 | End: 2017-12-10

## 2017-12-10 RX ORDER — PROMETHAZINE HYDROCHLORIDE 25 MG/ML
INJECTION, SOLUTION INTRAMUSCULAR; INTRAVENOUS
Status: COMPLETED
Start: 2017-12-10 | End: 2017-12-10

## 2017-12-10 RX ADMIN — BUTORPHANOL TARTRATE 2 MG: 2 INJECTION, SOLUTION INTRAMUSCULAR; INTRAVENOUS at 10:29

## 2017-12-10 RX ADMIN — PROMETHAZINE HYDROCHLORIDE 12.5 MG: 25 INJECTION INTRAMUSCULAR; INTRAVENOUS at 10:29

## 2017-12-10 RX ADMIN — Medication 2 MG: at 10:29

## 2017-12-10 RX ADMIN — PROMETHAZINE HYDROCHLORIDE 12.5 MG: 25 INJECTION, SOLUTION INTRAMUSCULAR; INTRAVENOUS at 10:29

## 2017-12-10 NOTE — ED PROVIDER NOTES
Subjective   HPI Comments: Pt comes in with recurrent migraine that started x6 days ago, exacerbated by the loss of her mother at the same time. States that she has recently gotten closer with her mother and she is taking her loss extremely hard. States that she has to go to her visitation at the  home tonight and needs something to help with her headache so she can go home and sleep for a few hours before she has to go. She states that this is just as her typical migraine, she has had nausea and vomiting both. Denies any new associated symptoms.     Patient is a 42 y.o. female presenting with migraines.   History provided by:  Patient   used: No    Migraine   Pain location:  Frontal  Quality:  Sharp  Radiates to:  Does not radiate  Severity at highest:  10/10  Onset quality:  Sudden  Duration:  6 days  Timing:  Constant  Progression:  Worsening  Chronicity:  Recurrent  Similar to prior headaches: yes    Context: bright light, emotional stress and loud noise    Context comment:  Pt lost her mother x6 days ago and is supposed to have her visitation this evneing.   Relieved by:  Nothing  Worsened by:  Sound, light and activity  Ineffective treatments:  None tried  Associated symptoms: nausea and vomiting    Associated symptoms: no blurred vision, no congestion, no cough, no diarrhea, no dizziness, no ear pain, no eye pain, no fever, no focal weakness, no myalgias, no neck pain, no numbness, no sore throat and no syncope    Risk factors: no anger and no family hx of SAH        Review of Systems   Constitutional: Negative for activity change and fever.   HENT: Negative for congestion, ear pain and sore throat.    Eyes: Negative for blurred vision and pain.   Respiratory: Negative for cough, shortness of breath and wheezing.    Cardiovascular: Negative for chest pain and syncope.   Gastrointestinal: Positive for nausea and vomiting. Negative for abdominal distention and diarrhea.    Genitourinary: Negative for difficulty urinating and dysuria.   Musculoskeletal: Negative for arthralgias, myalgias and neck pain.   Skin: Negative for rash and wound.   Neurological: Negative for dizziness, focal weakness, numbness and headaches.   Psychiatric/Behavioral: Negative for agitation.   All other systems reviewed and are negative.      Past Medical History:   Diagnosis Date   • Abdominal pain    • Abdominal swelling    • Hoyos's disease    • Bipolar 1 disorder    • Brain tumor     R Frontal Lobe per pt   • Brain tumor 2014   • Brain tumor (benign)    • Constipation    • DDD (degenerative disc disease), cervical 05/29/2017   • DDD (degenerative disc disease), cervical    • Diarrhea    • Fibromyalgia    • IBS (irritable bowel syndrome)    • Migraine    • Nausea & vomiting    • PONV (postoperative nausea and vomiting)    • PTSD (post-traumatic stress disorder)    • Rectal bleeding        Allergies   Allergen Reactions   • Ativan [Lorazepam] Hallucinations     confusion   • Sulfa Antibiotics Shortness Of Breath and Swelling   • Compazine [Prochlorperazine Edisylate] Hives   • Demerol [Meperidine] Hives   • Droperidol Itching   • Metoclopramide Swelling   • Toradol [Ketorolac Tromethamine] Hives and Itching   • Zofran [Ondansetron Hcl] Rash       Past Surgical History:   Procedure Laterality Date   • ANAL SCOPE N/A 7/28/2016    Procedure: ANAL SCOPE;  Surgeon: Kael Lopez MD;  Location: [facility] OR;  Service:    • APPENDECTOMY     • COLONOSCOPY N/A 6/30/2016    Procedure: COLONOSCOPY  CPTCODE:91901;  Surgeon: Jose Antonio Belle III, MD;  Location: [facility] OR;  Service:    • COLONOSCOPY N/A 7/7/2016    Procedure: COLONOSCOPY (47738) CPT;  Surgeon: Jose Antonio Belle III, MD;  Location: [facility] OR;  Service:    • CYSTOSCOPY RETROGRADE PYELOGRAM Bilateral 4/28/2017    Procedure: CYSTOSCOPY RETROGRADE PYELOGRAM;  Surgeon: Mu Blunt MD;  Location: [facility] OR;  Service:    • ENDOSCOPY N/A 6/30/2016     Procedure: ESOPHAGOGASTRODUODENOSCOPY WITH BIOPSY  CPTCODE:84012;  Surgeon: Jose Antonio Belle III, MD;  Location: [facility] OR;  Service:    • HEMORRHOIDECTOMY N/A 7/28/2016    Procedure: HEMORRHOID STAPLING;  Surgeon: Kael Lopez MD;  Location: [facility] OR;  Service:    • HYSTERECTOMY     • KNEE SURGERY     • LAPAROSCOPIC SALPINGOOPHERECTOMY     • PORTACATH PLACEMENT N/A 7/28/2017    Procedure: INSERTION OF PORTACATH;  Surgeon: Celso Arredondo MD;  Location: [facility] OR;  Service:    • SHOULDER SURGERY      3 times       Family History   Problem Relation Age of Onset   • Crohn's disease Other    • Hypertension Other    • Diabetes Other    • Irritable bowel syndrome Other        Social History     Social History   • Marital status:      Spouse name: N/A   • Number of children: N/A   • Years of education: N/A     Social History Main Topics   • Smoking status: Former Smoker     Packs/day: 1.00     Years: 20.00     Quit date: 11/1/2015   • Smokeless tobacco: Never Used   • Alcohol use No   • Drug use: Yes     Special: Marijuana   • Sexual activity: Defer     Other Topics Concern   • Not on file     Social History Narrative           Objective   Physical Exam   Constitutional: She is oriented to person, place, and time. She appears well-developed and well-nourished.   HENT:   Head: Normocephalic and atraumatic.   Eyes: EOM are normal. Pupils are equal, round, and reactive to light.   Neck: Normal range of motion. Neck supple.   Cardiovascular: Normal rate, regular rhythm and normal heart sounds.    Pulmonary/Chest: Effort normal and breath sounds normal.   Abdominal: Soft. Bowel sounds are normal.   Musculoskeletal: Normal range of motion.   Neurological: She is alert and oriented to person, place, and time.   Skin: Skin is warm and dry.   Psychiatric: She has a normal mood and affect. Her behavior is normal. Judgment and thought content normal.   Nursing note and vitals reviewed.      Procedures          ED Course  ED Course                  MDM  Number of Diagnoses or Management Options  Migraine without aura and without status migrainosus, not intractable:      Amount and/or Complexity of Data Reviewed  Clinical lab tests: ordered and reviewed  Tests in the radiology section of CPT®: ordered and reviewed  Tests in the medicine section of CPT®: ordered and reviewed    Patient Progress  Patient progress: stable      Final diagnoses:   Migraine without aura and without status migrainosus, not intractable            TAMI Vicente  12/10/17 [id number]

## 2017-12-12 ENCOUNTER — HOSPITAL ENCOUNTER (EMERGENCY)
Facility: HOSPITAL | Age: 42
Discharge: HOME | End: 2017-12-12
Admitting: EMERGENCY MEDICINE

## 2017-12-12 VITALS
HEART RATE: 64 BPM | SYSTOLIC BLOOD PRESSURE: 118 MMHG | BODY MASS INDEX: 23.7 KG/M2 | DIASTOLIC BLOOD PRESSURE: 84 MMHG | RESPIRATION RATE: 18 BRPM | HEIGHT: 69 IN | TEMPERATURE: 97.6 F | WEIGHT: 160 LBS | OXYGEN SATURATION: 99 %

## 2017-12-12 DIAGNOSIS — R51.9 ACUTE NONINTRACTABLE HEADACHE, UNSPECIFIED HEADACHE TYPE: Primary | ICD-10-CM

## 2017-12-12 PROCEDURE — 25010000002 BUTORPHANOL PER 1 MG: Performed by: NURSE PRACTITIONER

## 2017-12-12 PROCEDURE — 25010000002 PROMETHAZINE PER 50 MG: Performed by: NURSE PRACTITIONER

## 2017-12-12 PROCEDURE — 96372 THER/PROPH/DIAG INJ SC/IM: CPT

## 2017-12-12 PROCEDURE — 99283 EMERGENCY DEPT VISIT LOW MDM: CPT

## 2017-12-12 RX ORDER — PROMETHAZINE HYDROCHLORIDE 25 MG/ML
12.5 INJECTION, SOLUTION INTRAMUSCULAR; INTRAVENOUS ONCE
Status: COMPLETED | OUTPATIENT
Start: 2017-12-12 | End: 2017-12-12

## 2017-12-12 RX ADMIN — PROMETHAZINE HYDROCHLORIDE 12.5 MG: 25 INJECTION INTRAMUSCULAR; INTRAVENOUS at 11:55

## 2017-12-12 RX ADMIN — BUTORPHANOL TARTRATE 2 MG: 2 INJECTION, SOLUTION INTRAMUSCULAR; INTRAVENOUS at 11:53

## 2017-12-12 NOTE — ED PROVIDER NOTES
Subjective   Patient is a 42 y.o. female presenting with headaches.   History provided by:  Patient   used: No    Headache   Pain location:  Generalized  Quality:  Dull  Radiates to:  Does not radiate  Severity currently:  10/10  Severity at highest:  10/10  Onset quality:  Sudden  Duration:  2 days  Timing:  Constant  Progression:  Waxing and waning  Chronicity:  Recurrent  Similar to prior headaches: yes    Context: not activity, not exposure to bright light, not caffeine, not coughing, not defecating, not stress, not exposure to cold air, not intercourse, not loud noise and not straining    Relieved by:  None tried  Worsened by:  Nothing  Ineffective treatments:  None tried  Associated symptoms: no abdominal pain, no back pain, no cough, no eye pain, no facial pain, no hearing loss, no near-syncope, no neck pain, no numbness, no paresthesias, no seizures, no sinus pressure, no sore throat, no swollen glands, no URI, no visual change, no vomiting and no weakness    Risk factors: no anger, no family hx of SAH, does not have insomnia and lifestyle not sedentary        Review of Systems   Constitutional: Negative.    HENT: Negative for hearing loss, sinus pressure and sore throat.    Eyes: Negative.  Negative for pain.   Respiratory: Negative.  Negative for cough.    Cardiovascular: Negative.  Negative for near-syncope.   Gastrointestinal: Negative.  Negative for abdominal pain and vomiting.   Endocrine: Negative.    Genitourinary: Negative.    Musculoskeletal: Negative.  Negative for back pain and neck pain.   Skin: Negative.    Allergic/Immunologic: Negative.    Neurological: Positive for headaches. Negative for seizures, weakness, numbness and paresthesias.   Hematological: Negative.    Psychiatric/Behavioral: Negative.        Past Medical History:   Diagnosis Date   • Abdominal pain    • Abdominal swelling    • Hoyos's disease    • Bipolar 1 disorder    • Brain tumor     R Frontal Lobe per pt    • Brain tumor 2014   • Brain tumor (benign)    • Constipation    • DDD (degenerative disc disease), cervical 05/29/2017   • DDD (degenerative disc disease), cervical    • Diarrhea    • Fibromyalgia    • IBS (irritable bowel syndrome)    • Migraine    • Nausea & vomiting    • PONV (postoperative nausea and vomiting)    • PTSD (post-traumatic stress disorder)    • Rectal bleeding        Allergies   Allergen Reactions   • Ativan [Lorazepam] Hallucinations     confusion   • Sulfa Antibiotics Shortness Of Breath and Swelling   • Compazine [Prochlorperazine Edisylate] Hives   • Demerol [Meperidine] Hives   • Droperidol Itching   • Metoclopramide Swelling   • Toradol [Ketorolac Tromethamine] Hives and Itching   • Zofran [Ondansetron Hcl] Rash       Past Surgical History:   Procedure Laterality Date   • ANAL SCOPE N/A 7/28/2016    Procedure: ANAL SCOPE;  Surgeon: Kael Lopez MD;  Location: [facility] OR;  Service:    • APPENDECTOMY     • COLONOSCOPY N/A 6/30/2016    Procedure: COLONOSCOPY  CPTCODE:86850;  Surgeon: Jose Antonio Belle III, MD;  Location: [facility] OR;  Service:    • COLONOSCOPY N/A 7/7/2016    Procedure: COLONOSCOPY (50863) CPT;  Surgeon: Jose Antonio Belle III, MD;  Location: [facility] OR;  Service:    • CYSTOSCOPY RETROGRADE PYELOGRAM Bilateral 4/28/2017    Procedure: CYSTOSCOPY RETROGRADE PYELOGRAM;  Surgeon: Mu Blunt MD;  Location: [facility] OR;  Service:    • ENDOSCOPY N/A 6/30/2016    Procedure: ESOPHAGOGASTRODUODENOSCOPY WITH BIOPSY  CPTCODE:95775;  Surgeon: Jose Antonio Belle III, MD;  Location: [facility] OR;  Service:    • HEMORRHOIDECTOMY N/A 7/28/2016    Procedure: HEMORRHOID STAPLING;  Surgeon: Kael Lopez MD;  Location: [facility] OR;  Service:    • HYSTERECTOMY     • KNEE SURGERY     • LAPAROSCOPIC SALPINGOOPHERECTOMY     • PORTACATH PLACEMENT N/A 7/28/2017    Procedure: INSERTION OF PORTACATH;  Surgeon: Cleso Arredondo MD;  Location: [facility] OR;  Service:    • SHOULDER SURGERY      3  times       Family History   Problem Relation Age of Onset   • Crohn's disease Other    • Hypertension Other    • Diabetes Other    • Irritable bowel syndrome Other        Social History     Social History   • Marital status:      Spouse name: N/A   • Number of children: N/A   • Years of education: N/A     Social History Main Topics   • Smoking status: Former Smoker     Packs/day: 1.00     Years: 20.00     Quit date: 11/1/2015   • Smokeless tobacco: Never Used   • Alcohol use No   • Drug use: Yes     Special: Marijuana   • Sexual activity: Defer     Other Topics Concern   • None     Social History Narrative           Objective   Physical Exam   Constitutional: She is oriented to person, place, and time. She appears well-developed and well-nourished.   HENT:   Head: Normocephalic.   Right Ear: External ear normal.   Left Ear: External ear normal.   Mouth/Throat: Oropharynx is clear and moist.   Eyes: EOM are normal. Pupils are equal, round, and reactive to light.   Neck: Normal range of motion. Neck supple.   Cardiovascular: Normal rate and regular rhythm.    Pulmonary/Chest: Effort normal and breath sounds normal.   Abdominal: Soft. Bowel sounds are normal.   Musculoskeletal: Normal range of motion.   Neurological: She is alert and oriented to person, place, and time.   Skin: Skin is warm and dry.   Psychiatric: She has a normal mood and affect. Her behavior is normal.   Nursing note and vitals reviewed.      Procedures         ED Course  ED Course                  MDM    Final diagnoses:   Acute nonintractable headache, unspecified headache type            Ja Suarez, APRN  12/12/17 0025

## 2017-12-22 ENCOUNTER — HOSPITAL ENCOUNTER (EMERGENCY)
Facility: HOSPITAL | Age: 42
Discharge: HOME | End: 2017-12-22
Attending: EMERGENCY MEDICINE | Admitting: EMERGENCY MEDICINE

## 2017-12-22 ENCOUNTER — APPOINTMENT (OUTPATIENT)
Dept: GENERAL RADIOLOGY | Facility: HOSPITAL | Age: 42
End: 2017-12-22

## 2017-12-22 VITALS
DIASTOLIC BLOOD PRESSURE: 78 MMHG | SYSTOLIC BLOOD PRESSURE: 100 MMHG | WEIGHT: 163 LBS | BODY MASS INDEX: 24.14 KG/M2 | OXYGEN SATURATION: 98 % | HEART RATE: 70 BPM | RESPIRATION RATE: 18 BRPM | TEMPERATURE: 98.2 F | HEIGHT: 69 IN

## 2017-12-22 DIAGNOSIS — J20.9 ACUTE BRONCHITIS, UNSPECIFIED ORGANISM: Primary | ICD-10-CM

## 2017-12-22 DIAGNOSIS — R07.89 CHEST PAIN, ATYPICAL: ICD-10-CM

## 2017-12-22 LAB
ALBUMIN SERPL-MCNC: 4.2 G/DL (ref 3.5–5)
ALBUMIN/GLOB SERPL: 1.6 G/DL (ref 1.5–2.5)
ALP SERPL-CCNC: 79 U/L (ref 35–104)
ALT SERPL W P-5'-P-CCNC: 60 U/L (ref 10–36)
ANION GAP SERPL CALCULATED.3IONS-SCNC: 4.1 MMOL/L (ref 3.6–11.2)
AST SERPL-CCNC: 50 U/L (ref 10–30)
B-HCG UR QL: NEGATIVE
BACTERIA UR QL AUTO: ABNORMAL /HPF
BASOPHILS # BLD AUTO: 0.01 10*3/MM3 (ref 0–0.3)
BASOPHILS NFR BLD AUTO: 0.2 % (ref 0–2)
BILIRUB SERPL-MCNC: 0.6 MG/DL (ref 0.2–1.8)
BILIRUB UR QL STRIP: NEGATIVE
BUN BLD-MCNC: 11 MG/DL (ref 7–21)
BUN/CREAT SERPL: 17.7 (ref 7–25)
CALCIUM SPEC-SCNC: 9.4 MG/DL (ref 7.7–10)
CHLORIDE SERPL-SCNC: 106 MMOL/L (ref 99–112)
CK MB SERPL-CCNC: 0.59 NG/ML (ref 0–5)
CK MB SERPL-RTO: 1.4 % (ref 0–3)
CK SERPL-CCNC: 41 U/L (ref 24–173)
CLARITY UR: CLEAR
CO2 SERPL-SCNC: 29.9 MMOL/L (ref 24.3–31.9)
COLOR UR: YELLOW
CREAT BLD-MCNC: 0.62 MG/DL (ref 0.43–1.29)
DEPRECATED RDW RBC AUTO: 40.9 FL (ref 37–54)
EOSINOPHIL # BLD AUTO: 0.1 10*3/MM3 (ref 0–0.7)
EOSINOPHIL NFR BLD AUTO: 1.8 % (ref 0–5)
ERYTHROCYTE [DISTWIDTH] IN BLOOD BY AUTOMATED COUNT: 13.1 % (ref 11.5–14.5)
GFR SERPL CREATININE-BSD FRML MDRD: 106 ML/MIN/1.73
GLOBULIN UR ELPH-MCNC: 2.7 GM/DL
GLUCOSE BLD-MCNC: 95 MG/DL (ref 70–110)
GLUCOSE UR STRIP-MCNC: NEGATIVE MG/DL
HCT VFR BLD AUTO: 38.2 % (ref 37–47)
HGB BLD-MCNC: 12.9 G/DL (ref 12–16)
HGB UR QL STRIP.AUTO: NEGATIVE
HYALINE CASTS UR QL AUTO: ABNORMAL /LPF
IMM GRANULOCYTES # BLD: 0 10*3/MM3 (ref 0–0.03)
IMM GRANULOCYTES NFR BLD: 0 % (ref 0–0.5)
KETONES UR QL STRIP: ABNORMAL
LEUKOCYTE ESTERASE UR QL STRIP.AUTO: ABNORMAL
LYMPHOCYTES # BLD AUTO: 2.74 10*3/MM3 (ref 1–3)
LYMPHOCYTES NFR BLD AUTO: 48.3 % (ref 21–51)
MCH RBC QN AUTO: 29.5 PG (ref 27–33)
MCHC RBC AUTO-ENTMCNC: 33.8 G/DL (ref 33–37)
MCV RBC AUTO: 87.4 FL (ref 80–94)
MONOCYTES # BLD AUTO: 0.58 10*3/MM3 (ref 0.1–0.9)
MONOCYTES NFR BLD AUTO: 10.2 % (ref 0–10)
NEUTROPHILS # BLD AUTO: 2.24 10*3/MM3 (ref 1.4–6.5)
NEUTROPHILS NFR BLD AUTO: 39.5 % (ref 30–70)
NITRITE UR QL STRIP: NEGATIVE
OSMOLALITY SERPL CALC.SUM OF ELEC: 278.6 MOSM/KG (ref 273–305)
PH UR STRIP.AUTO: 7 [PH] (ref 5–8)
PLATELET # BLD AUTO: 119 10*3/MM3 (ref 130–400)
PMV BLD AUTO: 10.8 FL (ref 6–10)
POTASSIUM BLD-SCNC: 3.7 MMOL/L (ref 3.5–5.3)
PROT SERPL-MCNC: 6.9 G/DL (ref 6–8)
PROT UR QL STRIP: NEGATIVE
RBC # BLD AUTO: 4.37 10*6/MM3 (ref 4.2–5.4)
RBC # UR: ABNORMAL /HPF
REF LAB TEST METHOD: ABNORMAL
SODIUM BLD-SCNC: 140 MMOL/L (ref 135–153)
SP GR UR STRIP: 1.02 (ref 1–1.03)
SQUAMOUS #/AREA URNS HPF: ABNORMAL /HPF
TROPONIN I SERPL-MCNC: <0.006 NG/ML
TROPONIN I SERPL-MCNC: <0.006 NG/ML
UROBILINOGEN UR QL STRIP: ABNORMAL
WBC NRBC COR # BLD: 5.67 10*3/MM3 (ref 4.5–12.5)
WBC UR QL AUTO: ABNORMAL /HPF

## 2017-12-22 PROCEDURE — 96374 THER/PROPH/DIAG INJ IV PUSH: CPT

## 2017-12-22 PROCEDURE — 81025 URINE PREGNANCY TEST: CPT | Performed by: PHYSICIAN ASSISTANT

## 2017-12-22 PROCEDURE — 82550 ASSAY OF CK (CPK): CPT | Performed by: PHYSICIAN ASSISTANT

## 2017-12-22 PROCEDURE — 93005 ELECTROCARDIOGRAM TRACING: CPT | Performed by: PHYSICIAN ASSISTANT

## 2017-12-22 PROCEDURE — 85025 COMPLETE CBC W/AUTO DIFF WBC: CPT | Performed by: PHYSICIAN ASSISTANT

## 2017-12-22 PROCEDURE — 25010000002 HEPARIN FLUSH (PORCINE) 100 UNIT/ML SOLUTION: Performed by: EMERGENCY MEDICINE

## 2017-12-22 PROCEDURE — 99285 EMERGENCY DEPT VISIT HI MDM: CPT

## 2017-12-22 PROCEDURE — 93010 ELECTROCARDIOGRAM REPORT: CPT | Performed by: INTERNAL MEDICINE

## 2017-12-22 PROCEDURE — 82553 CREATINE MB FRACTION: CPT | Performed by: PHYSICIAN ASSISTANT

## 2017-12-22 PROCEDURE — 96375 TX/PRO/DX INJ NEW DRUG ADDON: CPT

## 2017-12-22 PROCEDURE — 81001 URINALYSIS AUTO W/SCOPE: CPT | Performed by: PHYSICIAN ASSISTANT

## 2017-12-22 PROCEDURE — 80053 COMPREHEN METABOLIC PANEL: CPT | Performed by: PHYSICIAN ASSISTANT

## 2017-12-22 PROCEDURE — 71010 XR CHEST 1 VW: CPT | Performed by: RADIOLOGY

## 2017-12-22 PROCEDURE — 84484 ASSAY OF TROPONIN QUANT: CPT | Performed by: PHYSICIAN ASSISTANT

## 2017-12-22 PROCEDURE — 25010000002 METHYLPREDNISOLONE PER 125 MG: Performed by: PHYSICIAN ASSISTANT

## 2017-12-22 PROCEDURE — 71010 HC CHEST PA OR AP: CPT

## 2017-12-22 PROCEDURE — 93005 ELECTROCARDIOGRAM TRACING: CPT | Performed by: EMERGENCY MEDICINE

## 2017-12-22 PROCEDURE — 25010000002 MORPHINE PER 10 MG: Performed by: EMERGENCY MEDICINE

## 2017-12-22 RX ORDER — MORPHINE SULFATE 2 MG/ML
2 INJECTION, SOLUTION INTRAMUSCULAR; INTRAVENOUS ONCE
Status: COMPLETED | OUTPATIENT
Start: 2017-12-22 | End: 2017-12-22

## 2017-12-22 RX ORDER — METHYLPREDNISOLONE SODIUM SUCCINATE 125 MG/2ML
125 INJECTION, POWDER, LYOPHILIZED, FOR SOLUTION INTRAMUSCULAR; INTRAVENOUS ONCE
Status: COMPLETED | OUTPATIENT
Start: 2017-12-22 | End: 2017-12-22

## 2017-12-22 RX ORDER — SODIUM CHLORIDE 0.9 % (FLUSH) 0.9 %
10 SYRINGE (ML) INJECTION AS NEEDED
Status: DISCONTINUED | OUTPATIENT
Start: 2017-12-22 | End: 2017-12-22 | Stop reason: HOSPADM

## 2017-12-22 RX ORDER — NAPROXEN 250 MG/1
500 TABLET ORAL ONCE
Status: COMPLETED | OUTPATIENT
Start: 2017-12-22 | End: 2017-12-22

## 2017-12-22 RX ADMIN — SODIUM CHLORIDE 500 ML: 9 INJECTION, SOLUTION INTRAVENOUS at 03:10

## 2017-12-22 RX ADMIN — MORPHINE SULFATE 2 MG: 2 INJECTION, SOLUTION INTRAMUSCULAR; INTRAVENOUS at 03:12

## 2017-12-22 RX ADMIN — NAPROXEN 500 MG: 250 TABLET ORAL at 05:00

## 2017-12-22 RX ADMIN — METHYLPREDNISOLONE SODIUM SUCCINATE 125 MG: 125 INJECTION, POWDER, FOR SOLUTION INTRAMUSCULAR; INTRAVENOUS at 03:11

## 2017-12-22 RX ADMIN — SODIUM CHLORIDE, PRESERVATIVE FREE 300 UNITS: 5 INJECTION INTRAVENOUS at 06:42

## 2017-12-22 NOTE — DISCHARGE INSTRUCTIONS
Recommend that you follow up with Mabel Patterson. If your symptoms recur, get worse, or new symptoms arise recommend coming back to the ER for further evaluation.

## 2017-12-22 NOTE — ED PROVIDER NOTES
Subjective   Patient is a 42 y.o. female presenting with chest pain.   History provided by:  Patient   used: No    Chest Pain   Pain location:  L chest  Pain quality: stabbing    Pain quality comment:  Squeezing  Pain radiates to:  L arm  Pain severity:  Moderate  Duration:  1 day  Timing:  Constant  Progression:  Worsening  Chronicity:  New  Context: breathing and movement    Relieved by:  Nothing  Worsened by:  Nothing  Associated symptoms: cough    Associated symptoms: no abdominal pain, no fever, no nausea and no shortness of breath    Cough:     Cough characteristics:  Productive    Sputum characteristics:  Bloody (mixed bright red blood)    Duration:  1 day    Timing:  Intermittent    Progression:  Improving    Chronicity:  New  Risk factors: no coronary artery disease, no high cholesterol, no hypertension, not male and no smoking        Review of Systems   Constitutional: Negative for fever.   HENT: Negative.    Respiratory: Positive for cough. Negative for shortness of breath.    Cardiovascular: Positive for chest pain.   Gastrointestinal: Negative.  Negative for abdominal pain and nausea.   Endocrine: Negative.    Genitourinary: Negative.  Negative for dysuria.   Skin: Negative.    Psychiatric/Behavioral: Negative.    All other systems reviewed and are negative.      Past Medical History:   Diagnosis Date   • Abdominal pain    • Abdominal swelling    • Hoyos's disease    • Bipolar 1 disorder    • Brain tumor     R Frontal Lobe per pt   • Brain tumor 2014   • Brain tumor (benign)    • Constipation    • DDD (degenerative disc disease), cervical 05/29/2017   • DDD (degenerative disc disease), cervical    • Diarrhea    • Fibromyalgia    • IBS (irritable bowel syndrome)    • Migraine    • Nausea & vomiting    • PONV (postoperative nausea and vomiting)    • PTSD (post-traumatic stress disorder)    • Rectal bleeding        Allergies   Allergen Reactions   • Ativan [Lorazepam] Hallucinations      confusion   • Sulfa Antibiotics Shortness Of Breath and Swelling   • Compazine [Prochlorperazine Edisylate] Hives   • Demerol [Meperidine] Hives   • Droperidol Itching   • Metoclopramide Swelling   • Toradol [Ketorolac Tromethamine] Hives and Itching   • Zofran [Ondansetron Hcl] Rash       Past Surgical History:   Procedure Laterality Date   • ANAL SCOPE N/A 7/28/2016    Procedure: ANAL SCOPE;  Surgeon: Kael Lopez MD;  Location: [facility] OR;  Service:    • APPENDECTOMY     • COLONOSCOPY N/A 6/30/2016    Procedure: COLONOSCOPY  CPTCODE:24915;  Surgeon: Jose Antonio Belle III, MD;  Location: [facility] OR;  Service:    • COLONOSCOPY N/A 7/7/2016    Procedure: COLONOSCOPY (75740) CPT;  Surgeon: Jose Antonio Belle III, MD;  Location: [facility] OR;  Service:    • CYSTOSCOPY RETROGRADE PYELOGRAM Bilateral 4/28/2017    Procedure: CYSTOSCOPY RETROGRADE PYELOGRAM;  Surgeon: Mu Blunt MD;  Location: [facility] OR;  Service:    • ENDOSCOPY N/A 6/30/2016    Procedure: ESOPHAGOGASTRODUODENOSCOPY WITH BIOPSY  CPTCODE:21238;  Surgeon: Jose Antonio Belle III, MD;  Location: [facility] OR;  Service:    • HEMORRHOIDECTOMY N/A 7/28/2016    Procedure: HEMORRHOID STAPLING;  Surgeon: Kael Lopez MD;  Location: [facility] OR;  Service:    • HYSTERECTOMY     • KNEE SURGERY     • LAPAROSCOPIC SALPINGOOPHERECTOMY     • PORTACATH PLACEMENT N/A 7/28/2017    Procedure: INSERTION OF PORTACATH;  Surgeon: Celso Arredondo MD;  Location: [facility] OR;  Service:    • SHOULDER SURGERY      3 times       Family History   Problem Relation Age of Onset   • Crohn's disease Other    • Hypertension Other    • Diabetes Other    • Irritable bowel syndrome Other        Social History     Social History   • Marital status:      Spouse name: N/A   • Number of children: N/A   • Years of education: N/A     Social History Main Topics   • Smoking status: Former Smoker     Packs/day: 1.00     Years: 20.00     Quit date: 11/1/2015   • Smokeless tobacco:  Never Used   • Alcohol use No   • Drug use: Yes     Special: Marijuana   • Sexual activity: Defer     Other Topics Concern   • None     Social History Narrative           Objective   Physical Exam   Constitutional: She is oriented to person, place, and time. She appears well-developed and well-nourished. No distress.   HENT:   Head: Normocephalic and atraumatic.   Right Ear: External ear normal.   Left Ear: External ear normal.   Nose: Nose normal.   Eyes: Conjunctivae and EOM are normal. Pupils are equal, round, and reactive to light.   Neck: Normal range of motion. Neck supple. No JVD present. No tracheal deviation present.   Cardiovascular: Normal rate, regular rhythm and normal heart sounds.    No murmur heard.  Pulmonary/Chest: Effort normal and breath sounds normal. No respiratory distress. She has no wheezes. She exhibits tenderness.       Abdominal: Soft. Bowel sounds are normal. There is no tenderness.   Musculoskeletal: Normal range of motion. She exhibits no edema or deformity.   Neurological: She is alert and oriented to person, place, and time. No cranial nerve deficit.   Skin: Skin is warm and dry. No rash noted. She is not diaphoretic. No erythema. No pallor.   Psychiatric: She has a normal mood and affect. Her behavior is normal. Thought content normal.   Nursing note and vitals reviewed.      Procedures         ED Course  ED Course   Value Comment By Time   ECG 12 Lead Per Dr. Walker, there is no acute ischemic changes. Atrial fibrillation with HR 70. New onset as compared to previous EKG's. Han Varghese PA-C 12/22 0088   ECG 12 Lead Per Dr. Xavier, repeat EKG shows NSR with HR 74. Han Varghese PA-C 12/22 5014    Uncomplicated ED course. Cardiac monitor revealed NSR without evidence of atrial fibrillation as appeared on initial EKG. Han Varghese PA-C 12/22 7623    Discussed with Dr. Xavier and he concurs that patient can follow up as an outpatient with PCP. This was  discussed with patient and  at bedside. Han Varghese PA-C 12/22 0559                HEART Score (for prediction of 6-week risk of major adverse cardiac event) reviewed and/or performed as part of the patient evaluation and treatment planning process.  The result associated with this review/performance is: 1       MDM  Number of Diagnoses or Management Options  Acute bronchitis, unspecified organism: new and does not require workup  Chest pain, atypical: new and requires workup     Amount and/or Complexity of Data Reviewed  Clinical lab tests: reviewed and ordered  Tests in the radiology section of CPT®: reviewed and ordered  Tests in the medicine section of CPT®: reviewed and ordered  Review and summarize past medical records: yes  Discuss the patient with other providers: yes  Independent visualization of images, tracings, or specimens: yes    Risk of Complications, Morbidity, and/or Mortality  Presenting problems: moderate  Diagnostic procedures: moderate  Management options: moderate    Patient Progress  Patient progress: stable      Final diagnoses:   Acute bronchitis, unspecified organism   Chest pain, atypical            Han Varghese PA-C  12/22/17 0756

## 2017-12-26 ENCOUNTER — APPOINTMENT (OUTPATIENT)
Dept: GENERAL RADIOLOGY | Facility: HOSPITAL | Age: 42
End: 2017-12-26

## 2017-12-26 ENCOUNTER — HOSPITAL ENCOUNTER (EMERGENCY)
Facility: HOSPITAL | Age: 42
Discharge: HOME | End: 2017-12-26
Attending: EMERGENCY MEDICINE | Admitting: EMERGENCY MEDICINE

## 2017-12-26 VITALS
RESPIRATION RATE: 18 BRPM | BODY MASS INDEX: 23.4 KG/M2 | TEMPERATURE: 97.6 F | HEART RATE: 64 BPM | HEIGHT: 69 IN | SYSTOLIC BLOOD PRESSURE: 139 MMHG | WEIGHT: 158 LBS | DIASTOLIC BLOOD PRESSURE: 84 MMHG | OXYGEN SATURATION: 98 %

## 2017-12-26 DIAGNOSIS — G43.909 MIGRAINE WITHOUT STATUS MIGRAINOSUS, NOT INTRACTABLE, UNSPECIFIED MIGRAINE TYPE: Primary | ICD-10-CM

## 2017-12-26 DIAGNOSIS — S42.035A CLOSED NONDISPLACED FRACTURE OF ACROMIAL END OF LEFT CLAVICLE, INITIAL ENCOUNTER: ICD-10-CM

## 2017-12-26 PROCEDURE — 73030 X-RAY EXAM OF SHOULDER: CPT | Performed by: RADIOLOGY

## 2017-12-26 PROCEDURE — 96375 TX/PRO/DX INJ NEW DRUG ADDON: CPT

## 2017-12-26 PROCEDURE — 25010000002 METHYLPREDNISOLONE PER 125 MG: Performed by: PHYSICIAN ASSISTANT

## 2017-12-26 PROCEDURE — 25010000002 BUTORPHANOL PER 1 MG: Performed by: EMERGENCY MEDICINE

## 2017-12-26 PROCEDURE — 73030 X-RAY EXAM OF SHOULDER: CPT

## 2017-12-26 PROCEDURE — 25010000002 PROMETHAZINE PER 50 MG: Performed by: EMERGENCY MEDICINE

## 2017-12-26 PROCEDURE — 25010000002 HEPARIN FLUSH (PORCINE) 100 UNIT/ML SOLUTION: Performed by: PHYSICIAN ASSISTANT

## 2017-12-26 PROCEDURE — 99283 EMERGENCY DEPT VISIT LOW MDM: CPT

## 2017-12-26 PROCEDURE — 96372 THER/PROPH/DIAG INJ SC/IM: CPT

## 2017-12-26 PROCEDURE — 96365 THER/PROPH/DIAG IV INF INIT: CPT

## 2017-12-26 RX ORDER — BUTORPHANOL TARTRATE 1 MG/ML
1 INJECTION, SOLUTION INTRAMUSCULAR; INTRAVENOUS ONCE
Status: COMPLETED | OUTPATIENT
Start: 2017-12-26 | End: 2017-12-26

## 2017-12-26 RX ORDER — METHYLPREDNISOLONE SODIUM SUCCINATE 125 MG/2ML
80 INJECTION, POWDER, LYOPHILIZED, FOR SOLUTION INTRAMUSCULAR; INTRAVENOUS ONCE
Status: COMPLETED | OUTPATIENT
Start: 2017-12-26 | End: 2017-12-26

## 2017-12-26 RX ORDER — SODIUM CHLORIDE 0.9 % (FLUSH) 0.9 %
10 SYRINGE (ML) INJECTION AS NEEDED
Status: DISCONTINUED | OUTPATIENT
Start: 2017-12-26 | End: 2017-12-26 | Stop reason: HOSPADM

## 2017-12-26 RX ADMIN — METHYLPREDNISOLONE SODIUM SUCCINATE 80 MG: 125 INJECTION, POWDER, FOR SOLUTION INTRAMUSCULAR; INTRAVENOUS at 16:45

## 2017-12-26 RX ADMIN — BUTORPHANOL TARTRATE 1 MG: 1 INJECTION, SOLUTION INTRAMUSCULAR; INTRAVENOUS at 16:07

## 2017-12-26 RX ADMIN — HEPARIN SODIUM (PORCINE) LOCK FLUSH IV SOLN 100 UNIT/ML 300 UNITS: 100 SOLUTION at 17:19

## 2017-12-26 RX ADMIN — PROMETHAZINE HYDROCHLORIDE 25 MG: 25 INJECTION INTRAMUSCULAR; INTRAVENOUS at 16:06

## 2017-12-26 RX ADMIN — BUTORPHANOL TARTRATE 1 MG: 1 INJECTION, SOLUTION INTRAMUSCULAR; INTRAVENOUS at 17:02

## 2017-12-26 RX ADMIN — SODIUM CHLORIDE 1000 ML: 9 INJECTION, SOLUTION INTRAVENOUS at 16:07

## 2018-01-03 RX ORDER — MONTELUKAST SODIUM 10 MG/1
10 TABLET ORAL NIGHTLY
COMMUNITY
End: 2018-01-04 | Stop reason: HOSPADM

## 2018-01-03 RX ORDER — DIHYDROERGOTAMINE MESYLATE 4 MG/ML
1 SPRAY NASAL AS NEEDED
COMMUNITY
End: 2018-01-04 | Stop reason: HOSPADM

## 2018-01-03 RX ORDER — FLUTICASONE PROPIONATE 50 MCG
2 SPRAY, SUSPENSION (ML) NASAL DAILY
COMMUNITY
End: 2018-01-04 | Stop reason: HOSPADM

## 2018-01-08 ENCOUNTER — HOSPITAL ENCOUNTER (EMERGENCY)
Facility: HOSPITAL | Age: 43
Discharge: HOME | End: 2018-01-08
Admitting: FAMILY MEDICINE

## 2018-01-08 VITALS
WEIGHT: 163 LBS | OXYGEN SATURATION: 100 % | BODY MASS INDEX: 24.14 KG/M2 | TEMPERATURE: 97.9 F | DIASTOLIC BLOOD PRESSURE: 82 MMHG | RESPIRATION RATE: 18 BRPM | HEART RATE: 75 BPM | SYSTOLIC BLOOD PRESSURE: 117 MMHG | HEIGHT: 69 IN

## 2018-01-08 DIAGNOSIS — G43.901 MIGRAINE WITH STATUS MIGRAINOSUS, NOT INTRACTABLE, UNSPECIFIED MIGRAINE TYPE: Primary | ICD-10-CM

## 2018-01-08 PROCEDURE — 25010000002 DEXAMETHASONE PER 1 MG: Performed by: NURSE PRACTITIONER

## 2018-01-08 PROCEDURE — 96375 TX/PRO/DX INJ NEW DRUG ADDON: CPT

## 2018-01-08 PROCEDURE — 96376 TX/PRO/DX INJ SAME DRUG ADON: CPT

## 2018-01-08 PROCEDURE — 25010000002 PROMETHAZINE PER 50 MG: Performed by: NURSE PRACTITIONER

## 2018-01-08 PROCEDURE — 25010000002 HEPARIN FLUSH (PORCINE) 100 UNIT/ML SOLUTION: Performed by: NURSE PRACTITIONER

## 2018-01-08 PROCEDURE — 96374 THER/PROPH/DIAG INJ IV PUSH: CPT

## 2018-01-08 PROCEDURE — 25010000002 BUTORPHANOL PER 1 MG: Performed by: NURSE PRACTITIONER

## 2018-01-08 PROCEDURE — 99284 EMERGENCY DEPT VISIT MOD MDM: CPT

## 2018-01-08 RX ORDER — SODIUM CHLORIDE 0.9 % (FLUSH) 0.9 %
10 SYRINGE (ML) INJECTION AS NEEDED
Status: DISCONTINUED | OUTPATIENT
Start: 2018-01-08 | End: 2018-01-08 | Stop reason: HOSPADM

## 2018-01-08 RX ORDER — DEXAMETHASONE SODIUM PHOSPHATE 4 MG/ML
4 INJECTION, SOLUTION INTRA-ARTICULAR; INTRALESIONAL; INTRAMUSCULAR; INTRAVENOUS; SOFT TISSUE ONCE
Status: COMPLETED | OUTPATIENT
Start: 2018-01-08 | End: 2018-01-08

## 2018-01-08 RX ORDER — BUTORPHANOL TARTRATE 1 MG/ML
1 INJECTION, SOLUTION INTRAMUSCULAR; INTRAVENOUS ONCE
Status: COMPLETED | OUTPATIENT
Start: 2018-01-08 | End: 2018-01-08

## 2018-01-08 RX ORDER — PROMETHAZINE HYDROCHLORIDE 25 MG/ML
12.5 INJECTION, SOLUTION INTRAMUSCULAR; INTRAVENOUS ONCE
Status: COMPLETED | OUTPATIENT
Start: 2018-01-08 | End: 2018-01-08

## 2018-01-08 RX ADMIN — BUTORPHANOL TARTRATE 1 MG: 1 INJECTION, SOLUTION INTRAMUSCULAR; INTRAVENOUS at 14:36

## 2018-01-08 RX ADMIN — BUTORPHANOL TARTRATE 1 MG: 1 INJECTION, SOLUTION INTRAMUSCULAR; INTRAVENOUS at 13:27

## 2018-01-08 RX ADMIN — DEXAMETHASONE SODIUM PHOSPHATE 4 MG: 4 INJECTION, SOLUTION INTRAMUSCULAR; INTRAVENOUS at 13:32

## 2018-01-08 RX ADMIN — PROMETHAZINE HYDROCHLORIDE 12.5 MG: 25 INJECTION INTRAMUSCULAR; INTRAVENOUS at 13:30

## 2018-01-08 RX ADMIN — SODIUM CHLORIDE 1000 ML: 9 INJECTION, SOLUTION INTRAVENOUS at 13:27

## 2018-01-08 RX ADMIN — SODIUM CHLORIDE, PRESERVATIVE FREE 300 UNITS: 5 INJECTION INTRAVENOUS at 15:21

## 2018-01-08 NOTE — ED PROVIDER NOTES
Subjective   Patient is a 42 y.o. female presenting with headaches.   History provided by:  Patient   used: No    Headache   Pain location:  Generalized  Quality:  Stabbing  Severity currently:  8/10  Severity at highest:  8/10  Onset quality:  Gradual  Duration:  3 days  Timing:  Constant  Progression:  Waxing and waning  Chronicity:  Recurrent  Similar to prior headaches: yes    Context: bright light    Context: not activity, not caffeine, not coughing, not defecating, not eating, not exposure to cold air, not intercourse, not loud noise and not straining    Relieved by:  Nothing  Worsened by:  Nothing  Ineffective treatments:  None tried  Associated symptoms: no blurred vision, no cough, no diarrhea, no dizziness, no drainage, no eye pain, no facial pain, no fatigue, no fever, no near-syncope, no neck stiffness, no numbness, no paresthesias, no photophobia, no seizures, no sore throat, no syncope, no tingling, no vomiting and no weakness    Risk factors: no anger, does not have insomnia and lifestyle not sedentary        Review of Systems   Constitutional: Negative.  Negative for fatigue and fever.   HENT: Negative for postnasal drip and sore throat.    Eyes: Negative.  Negative for blurred vision, photophobia and pain.   Respiratory: Negative.  Negative for cough.    Cardiovascular: Negative.  Negative for syncope and near-syncope.   Gastrointestinal: Negative.  Negative for diarrhea and vomiting.   Endocrine: Negative.    Genitourinary: Negative.    Musculoskeletal: Negative.  Negative for neck stiffness.   Skin: Negative.    Allergic/Immunologic: Negative.    Neurological: Positive for headaches. Negative for dizziness, seizures, weakness, numbness and paresthesias.   Hematological: Negative.    Psychiatric/Behavioral: Negative.        Past Medical History:   Diagnosis Date   • Abdominal pain    • Abdominal swelling    • Hoyos's disease    • Bipolar 1 disorder    • Brain tumor     R  Frontal Lobe per pt   • Brain tumor 2014   • Brain tumor (benign)    • Constipation    • DDD (degenerative disc disease), cervical 05/29/2017   • DDD (degenerative disc disease), cervical    • Diarrhea    • Fibromyalgia    • IBS (irritable bowel syndrome)    • Migraine    • Nausea & vomiting    • PONV (postoperative nausea and vomiting)    • PTSD (post-traumatic stress disorder)    • Rectal bleeding        Allergies   Allergen Reactions   • Ativan [Lorazepam] Hallucinations     confusion   • Sulfa Antibiotics Shortness Of Breath and Swelling   • Compazine [Prochlorperazine Edisylate] Hives   • Demerol [Meperidine] Hives   • Droperidol Itching   • Metoclopramide Swelling   • Toradol [Ketorolac Tromethamine] Hives and Itching   • Zofran [Ondansetron Hcl] Rash       Past Surgical History:   Procedure Laterality Date   • ANAL SCOPE N/A 7/28/2016    Procedure: ANAL SCOPE;  Surgeon: Kael Lopez MD;  Location: [facility] OR;  Service:    • APPENDECTOMY     • COLONOSCOPY N/A 6/30/2016    Procedure: COLONOSCOPY  CPTCODE:63067;  Surgeon: Jose Antonio Belle III, MD;  Location: [facility] OR;  Service:    • COLONOSCOPY N/A 7/7/2016    Procedure: COLONOSCOPY (25615) CPT;  Surgeon: Jose Antonio Belle III, MD;  Location: [facility] OR;  Service:    • CYSTOSCOPY RETROGRADE PYELOGRAM Bilateral 4/28/2017    Procedure: CYSTOSCOPY RETROGRADE PYELOGRAM;  Surgeon: Mu Blunt MD;  Location: [facility] OR;  Service:    • ENDOSCOPY N/A 6/30/2016    Procedure: ESOPHAGOGASTRODUODENOSCOPY WITH BIOPSY  CPTCODE:[id number];  Surgeon: Jose Antonio Belle III, MD;  Location: [facility] OR;  Service:    • HEMORRHOIDECTOMY N/A 7/28/2016    Procedure: HEMORRHOID STAPLING;  Surgeon: Kael Lopez MD;  Location: [facility] OR;  Service:    • HYSTERECTOMY     • KNEE SURGERY     • LAPAROSCOPIC SALPINGOOPHERECTOMY     • PORTACATH PLACEMENT N/A 7/28/2017    Procedure: INSERTION OF PORTACATH;  Surgeon: Celso Arredondo MD;  Location: [facility] OR;  Service:    •  SHOULDER SURGERY      3 times       Family History   Problem Relation Age of Onset   • Crohn's disease Other    • Hypertension Other    • Diabetes Other    • Irritable bowel syndrome Other        Social History     Social History   • Marital status:      Spouse name: N/A   • Number of children: N/A   • Years of education: N/A     Social History Main Topics   • Smoking status: Former Smoker     Packs/day: 1.00     Years: 20.00     Quit date: 11/1/2015   • Smokeless tobacco: Never Used   • Alcohol use No   • Drug use: Yes     Special: Marijuana      Comment: for pain   • Sexual activity: Defer     Other Topics Concern   • None     Social History Narrative           Objective   Physical Exam   Constitutional: She is oriented to person, place, and time. She appears well-developed and well-nourished.   HENT:   Head: Normocephalic.   Right Ear: External ear normal.   Left Ear: External ear normal.   Mouth/Throat: Oropharynx is clear and moist.   Eyes: EOM are normal. Pupils are equal, round, and reactive to light.   Neck: Normal range of motion. Neck supple.   Cardiovascular: Normal rate and regular rhythm.    Pulmonary/Chest: Effort normal and breath sounds normal.   Abdominal: Soft. Bowel sounds are normal.   Musculoskeletal: Normal range of motion.   Neurological: She is alert and oriented to person, place, and time.   Skin: Skin is warm and dry.   Psychiatric: She has a normal mood and affect. Her behavior is normal.   Nursing note and vitals reviewed.      Procedures         ED Course  ED Course                  MDM    Final diagnoses:   Migraine with status migrainosus, not intractable, unspecified migraine type            Ja Suarez, APRN  01/08/18 1444

## 2018-01-09 ENCOUNTER — OFFICE VISIT (OUTPATIENT)
Dept: UROLOGY | Facility: CLINIC | Age: 43
End: 2018-01-09

## 2018-01-09 VITALS — WEIGHT: 162.92 LBS | HEIGHT: 69 IN | BODY MASS INDEX: 24.13 KG/M2

## 2018-01-09 DIAGNOSIS — IMO0002 URETHRAL STENOSIS: Primary | ICD-10-CM

## 2018-01-09 DIAGNOSIS — N02.8 IGA NEPHROPATHY WITH BENIGN HEMATURIA: ICD-10-CM

## 2018-01-09 DIAGNOSIS — N99.12 POSTPROCEDURAL FEMALE URETHRAL STRICTURE: ICD-10-CM

## 2018-01-09 PROCEDURE — 99213 OFFICE O/P EST LOW 20 MIN: CPT | Performed by: UROLOGY

## 2018-01-09 PROCEDURE — 53661 DILATION OF URETHRA: CPT | Performed by: UROLOGY

## 2018-01-09 RX ORDER — GENTAMICIN SULFATE 40 MG/ML
80 INJECTION, SOLUTION INTRAMUSCULAR; INTRAVENOUS ONCE
Status: DISCONTINUED | OUTPATIENT
Start: 2018-01-09 | End: 2018-03-22 | Stop reason: HOSPADM

## 2018-01-09 RX ORDER — OXYCODONE HYDROCHLORIDE AND ACETAMINOPHEN 5; 325 MG/1; MG/1
TABLET ORAL
Qty: 20 TABLET | Refills: 0 | Status: SHIPPED | OUTPATIENT
Start: 2018-01-09 | End: 2018-03-22 | Stop reason: HOSPADM

## 2018-01-09 NOTE — PROGRESS NOTES
Chief Complaint:          Chief Complaint   Patient presents with   • urethral stenosis     dilatation       HPI:   42 y.o. female.  42 year old white female. Accompanied by  with IgA nephropathy, hepatitis C, severe urethral stenosis who presents for urethral dilation.  She is desirous of more pain medication but I explained to her carefully as long as she is on hydrocodone and I will not give her any more pain medication.  And if she's not on it she can only have several pills around the time of her dilation.  I also explained to her there is no permanent fix of the situation other than self dilatation which she refuses to do.Today she wants to learn to do intermittent catheter.  She gets pain medication from her primary care provider and she will be given pain medication only for short supply after painful dilatation this is been discussed with her primary care physician he will have dilatation no more than every 6 weeks in this office.  She returns today for follow-up.  She is now doing dramatically better.  She is now catheterizing herself intermittently.  It's helped dramatically I went ahead after prep and drape in a sterile fashion and dilated her sequentially to 24 [place] which was the max she can take.  There were no complications.  Overall, she's doing well, she is not gotten into see the nephrologist as yet for inexplicable reasons.  I reaffirmed the importance of a workup of her IgA nephropathy.           Past Medical History:        Past Medical History:   Diagnosis Date   • Abdominal pain    • Abdominal swelling    • Hoyos's disease    • Bipolar 1 disorder    • Brain tumor     R Frontal Lobe per pt   • Brain tumor 2014   • Brain tumor (benign)    • Constipation    • DDD (degenerative disc disease), cervical 05/29/2017   • DDD (degenerative disc disease), cervical    • Diarrhea    • Fibromyalgia    • IBS (irritable bowel syndrome)    • Migraine    • Nausea & vomiting    • PONV (postoperative  nausea and vomiting)    • PTSD (post-traumatic stress disorder)    • Rectal bleeding          Current Meds:     Current Outpatient Prescriptions   Medication Sig Dispense Refill   • azelastine (ASTEPRO) 0.15 % solution nasal spray 2 sprays into each nostril 2 (Two) Times a Day. 30 mL 0   • benzonatate (TESSALON) 200 MG capsule Take 1 capsule by mouth 3 (Three) Times a Day As Needed for Cough. 30 capsule 0   • busPIRone (BUSPAR) 15 MG tablet Take 15 mg by mouth 3 (three) times a day        • butorphanol (STADOL) 10 MG/ML nasal spray 1 spray into each nostril Daily.     • carbidopa-levodopa (SINEMET)  MG per tablet Take 1 tablet by mouth 3 (Three) Times a Day.     • cetirizine (zyrTEC) 10 MG tablet Take 1 tablet by mouth Daily. 30 tablet 0   • divalproex (DEPAKOTE) 500 MG DR tablet Take 250 mg by mouth 3 (Three) Times a Day.     • famotidine (PEPCID) 40 MG tablet Take 40 mg by mouth 2 (Two) Times a Day As Needed for heartburn.     • guaiFENesin (MUCINEX) 600 MG 12 hr tablet Take 2 tablets by mouth 2 (Two) Times a Day. 20 tablet 0   • HYDROcodone-acetaminophen (NORCO) 7.5-325 MG per tablet Take 1 tablet by mouth Every 6 (Six) Hours As Needed for Moderate Pain (4-6).     • oxybutynin (DITROPAN) 5 MG tablet Take 1 tablet by mouth 3 (Three) Times a Day. 90 tablet 11   • oxyCODONE-acetaminophen (PERCOCET)  MG per tablet Take 1 tablet by mouth Every 6 (Six) Hours As Needed for Moderate Pain . 12 tablet 0   • oxyCODONE-acetaminophen (PERCOCET) 5-325 MG per tablet 1 to 2 Tablets Every 6 Hours as needed for PAIN 20 tablet 0   • oxyCODONE-acetaminophen (PERCOCET) 5-325 MG per tablet 1 to 2 Tablets Every 6 Hours as needed for PAIN 20 tablet 0   • pantoprazole (PROTONIX) 40 MG EC tablet Take 40 mg by mouth Daily As Needed.     • polyethylene glycol (GoLYTELY) 236 g solution Starting at 6 pm on day prior to procedure, drink 8 ounces every 15 minutes until all gone or stools are clear. May add flavor packet. 4000 mL 0    • potassium chloride (K-DUR) 10 MEQ CR tablet Take 1 tablet by mouth Daily. 12 tablet 0   • promethazine (PHENERGAN) 25 MG tablet Take 1 tablet by mouth Every 6 (Six) Hours As Needed for Nausea or Vomiting. 30 tablet 0   • sertraline (ZOLOFT) 100 MG tablet Take 100 mg by mouth 2 (Two) Times a Day.     • SUMAtriptan (IMITREX) 6 MG/0.5ML solution injection Inject 6 mg under the skin 1 (One) Time.       No current facility-administered medications for this visit.         Allergies:      Allergies   Allergen Reactions   • Ativan [Lorazepam] Hallucinations     confusion   • Sulfa Antibiotics Shortness Of Breath and Swelling   • Compazine [Prochlorperazine Edisylate] Hives   • Demerol [Meperidine] Hives   • Droperidol Itching   • Metoclopramide Swelling   • Toradol [Ketorolac Tromethamine] Hives and Itching   • Zofran [Ondansetron Hcl] Rash        Past Surgical History:     Past Surgical History:   Procedure Laterality Date   • ANAL SCOPE N/A 7/28/2016    Procedure: ANAL SCOPE;  Surgeon: Kael Lopez MD;  Location: [facility] OR;  Service:    • APPENDECTOMY     • COLONOSCOPY N/A 6/30/2016    Procedure: COLONOSCOPY  CPTCODE:59718;  Surgeon: Jose Antonio Belle III, MD;  Location: [facility] OR;  Service:    • COLONOSCOPY N/A 7/7/2016    Procedure: COLONOSCOPY (40551) CPT;  Surgeon: Jose Antonio Belle III, MD;  Location: [facility] OR;  Service:    • CYSTOSCOPY RETROGRADE PYELOGRAM Bilateral 4/28/2017    Procedure: CYSTOSCOPY RETROGRADE PYELOGRAM;  Surgeon: Mu Blunt MD;  Location: [facility] OR;  Service:    • ENDOSCOPY N/A 6/30/2016    Procedure: ESOPHAGOGASTRODUODENOSCOPY WITH BIOPSY  CPTCODE:73473;  Surgeon: Jose Antonio Belle III, MD;  Location: [facility] OR;  Service:    • HEMORRHOIDECTOMY N/A 7/28/2016    Procedure: HEMORRHOID STAPLING;  Surgeon: Kael Lopez MD;  Location: [facility] OR;  Service:    • HYSTERECTOMY     • KNEE SURGERY     • LAPAROSCOPIC SALPINGOOPHERECTOMY     • PORTACATH PLACEMENT N/A 7/28/2017     Procedure: INSERTION OF PORTACATH;  Surgeon: Celso Arredondo MD;  Location: [facility];  Service:    • SHOULDER SURGERY      3 times         Social History:     Social History     Social History   • Marital status:      Spouse name: N/A   • Number of children: N/A   • Years of education: N/A     Occupational History   • Not on file.     Social History Main Topics   • Smoking status: Former Smoker     Packs/day: 1.00     Years: 20.00     Quit date: 11/1/2015   • Smokeless tobacco: Never Used   • Alcohol use No   • Drug use: Yes     Special: Marijuana      Comment: for pain   • Sexual activity: Defer     Other Topics Concern   • Not on file     Social History Narrative       Family History:     Family History   Problem Relation Age of Onset   • Crohn's disease Other    • Hypertension Other    • Diabetes Other    • Irritable bowel syndrome Other        Review of Systems:     Review of Systems   Constitutional: Negative.  Negative for activity change, appetite change, chills, diaphoresis, fatigue and unexpected weight change.   HENT: Negative for congestion, dental problem, drooling, ear discharge, ear pain, facial swelling, hearing loss, mouth sores, nosebleeds, postnasal drip, rhinorrhea, sinus pressure, sneezing, sore throat, tinnitus, trouble swallowing and voice change.    Eyes: Negative.  Negative for photophobia, pain, discharge, redness, itching and visual disturbance.   Respiratory: Negative.  Negative for apnea, cough, choking, chest tightness, shortness of breath, wheezing and stridor.    Cardiovascular: Negative.  Negative for chest pain, palpitations and leg swelling.   Gastrointestinal: Negative.  Negative for abdominal distention, abdominal pain, anal bleeding, blood in stool, constipation, diarrhea, nausea, rectal pain and vomiting.   Endocrine: Negative.  Negative for cold intolerance, heat intolerance, polydipsia, polyphagia and polyuria.   Genitourinary: Positive for difficulty urinating,  flank pain, hematuria and vaginal pain.   Musculoskeletal: Negative for arthralgias, back pain, gait problem, joint swelling, myalgias, neck pain and neck stiffness.   Skin: Negative.  Negative for color change, pallor, rash and wound.   Allergic/Immunologic: Negative.  Negative for environmental allergies, food allergies and immunocompromised state.   Neurological: Negative.  Negative for dizziness, tremors, seizures, syncope, facial asymmetry, speech difficulty, weakness, light-headedness, numbness and headaches.   Hematological: Negative.  Negative for adenopathy. Does not bruise/bleed easily.   Psychiatric/Behavioral: Negative for agitation, behavioral problems, confusion, decreased concentration, dysphoric mood, hallucinations, self-injury, sleep disturbance and suicidal ideas. The patient is not nervous/anxious and is not hyperactive.    All other systems reviewed and are negative.      Physical Exam:     Physical Exam   Constitutional: She appears well-developed and well-nourished.   HENT:   Head: Normocephalic and atraumatic.   Right Ear: External ear normal.   Left Ear: External ear normal.   Mouth/Throat: Oropharynx is clear and moist.   Eyes: Conjunctivae are normal. Pupils are equal, round, and reactive to light.   Cardiovascular: Normal rate, regular rhythm, normal heart sounds and intact distal pulses.    Pulmonary/Chest: Effort normal and breath sounds normal.   Abdominal: Soft. Bowel sounds are normal. She exhibits no distension and no mass. There is no tenderness. There is no rebound and no guarding.   Genitourinary: Vagina normal. No vaginal discharge found.   Genitourinary Comments: Soft nontender abdomen with no organomegaly, rigidity, guarding or tenderness.  Normal vaginal orifice.  No evidence of prolapse no palpable masses.  No significant perineal body abnormalities and a normal external anus.  Neurologic exam is nonfocal.   Musculoskeletal: Normal range of motion.   Neurological: She is  alert. She has normal reflexes.   Skin: Skin is warm and dry.   Psychiatric: She has a normal mood and affect. Her behavior is normal. Judgment and thought content normal.       Procedure:    urethral dilation-after an appropriate informed consent the patient was brought to the procedure suite.  The urethra was gently anesthetized with 10 cc of 2% viscous Xylocaine jelly.  After an adequate period of topical anesthesiathe urethra was gently anesthetized and dilated with Reese sounds from 16-24 Sinhala sequentially without complication the patient was given gentamicin as prophylaxis with 80 mg    Assessment:   No diagnosis found.  No orders of the defined types were placed in this encounter.      Plan:   Urethral stenosis-data's post dilatation without complication.  We'll recommend she continue with clean intermittent catheterization.  Narcotic pain medication-patient has significant acute pain that I believe would be an indication for the use of narcotic pain medication.  I discussed the significant risks of pain medication and the fact that this will be a short only option and I will give her no more than a three-day supply of pain medication.  And I will not plan long-term medication and that this will be sent to a pain clinic if at all becomes necessary.  We discussed signing a pain medication agreement and the fact that we're going to run a state [facility] review to be sure the patient is not getting pain medication from elsewhere.  If this is the case we will not give pain medication.  As part of the patient's treatment plan of there being prescribed a controlled substance with potential for abuse.  This patient has been wait aware of the appropriate dose of such medications including, the risk for somnolence, limited ability to drive and/or safety and the significant potential for overdose.  It is been made clear that these medications are for the prescribed patient only without concomitant use of alcohol or  other sepsis unless prescribed by the medical provider.  Has completed prescribing agreement detailing the terms of continue prescribing him a controlled substance.  Including monitoring Luis Alberto ports, the possibility of urine drug screens and pedal counts.  The patient is aware that we reviewed LUIS ALBERTO reports on a regular basis and scan them into the chart.  History and physical examination exhibited continued safe and appropriate use of controlled substances.  Also discussed the fact that the new [place] legislation allows only a three-day prescription for pain medication and Lasix in the face of chronic pain.  In this situation he will be referred to a chronic pain clinic.  IgA nephropathy-await nephrologic investigation           This document has been electronically signed by VERENICE FINK MD January 9, 2018 12:52 PM

## 2018-01-10 PROBLEM — N02.8: Status: ACTIVE | Noted: 2018-01-10

## 2018-01-10 PROBLEM — N02.B9: Status: ACTIVE | Noted: 2018-01-10

## 2018-01-15 ENCOUNTER — HOSPITAL ENCOUNTER (EMERGENCY)
Facility: HOSPITAL | Age: 43
Discharge: HOME | End: 2018-01-15
Attending: FAMILY MEDICINE | Admitting: FAMILY MEDICINE

## 2018-01-15 VITALS
HEIGHT: 69 IN | RESPIRATION RATE: 18 BRPM | HEART RATE: 71 BPM | BODY MASS INDEX: 23.7 KG/M2 | DIASTOLIC BLOOD PRESSURE: 75 MMHG | WEIGHT: 160 LBS | SYSTOLIC BLOOD PRESSURE: 121 MMHG | OXYGEN SATURATION: 97 % | TEMPERATURE: 97.8 F

## 2018-01-15 DIAGNOSIS — G43.709 CHRONIC MIGRAINE WITHOUT AURA WITHOUT STATUS MIGRAINOSUS, NOT INTRACTABLE: Primary | ICD-10-CM

## 2018-01-15 DIAGNOSIS — F41.9 ANXIETY: ICD-10-CM

## 2018-01-15 LAB
ALBUMIN SERPL-MCNC: 4 G/DL (ref 3.5–5)
ALBUMIN/GLOB SERPL: 1.5 G/DL (ref 1.5–2.5)
ALP SERPL-CCNC: 70 U/L (ref 35–104)
ALT SERPL W P-5'-P-CCNC: 63 U/L (ref 10–36)
ANION GAP SERPL CALCULATED.3IONS-SCNC: 6.8 MMOL/L (ref 3.6–11.2)
AST SERPL-CCNC: 38 U/L (ref 10–30)
BASOPHILS # BLD AUTO: 0.01 10*3/MM3 (ref 0–0.3)
BASOPHILS NFR BLD AUTO: 0.1 % (ref 0–2)
BILIRUB SERPL-MCNC: 0.6 MG/DL (ref 0.2–1.8)
BUN BLD-MCNC: 13 MG/DL (ref 7–21)
BUN/CREAT SERPL: 21.3 (ref 7–25)
CALCIUM SPEC-SCNC: 8.7 MG/DL (ref 7.7–10)
CHLORIDE SERPL-SCNC: 109 MMOL/L (ref 99–112)
CO2 SERPL-SCNC: 26.2 MMOL/L (ref 24.3–31.9)
CREAT BLD-MCNC: 0.61 MG/DL (ref 0.43–1.29)
DEPRECATED RDW RBC AUTO: 44.7 FL (ref 37–54)
EOSINOPHIL # BLD AUTO: 0.06 10*3/MM3 (ref 0–0.7)
EOSINOPHIL NFR BLD AUTO: 0.8 % (ref 0–5)
ERYTHROCYTE [DISTWIDTH] IN BLOOD BY AUTOMATED COUNT: 14.3 % (ref 11.5–14.5)
GFR SERPL CREATININE-BSD FRML MDRD: 108 ML/MIN/1.73
GLOBULIN UR ELPH-MCNC: 2.7 GM/DL
GLUCOSE BLD-MCNC: 95 MG/DL (ref 70–110)
HCT VFR BLD AUTO: 39.4 % (ref 37–47)
HGB BLD-MCNC: 12.9 G/DL (ref 12–16)
IMM GRANULOCYTES # BLD: 0.01 10*3/MM3 (ref 0–0.03)
IMM GRANULOCYTES NFR BLD: 0.1 % (ref 0–0.5)
LYMPHOCYTES # BLD AUTO: 3.58 10*3/MM3 (ref 1–3)
LYMPHOCYTES NFR BLD AUTO: 46.8 % (ref 21–51)
MCH RBC QN AUTO: 28.7 PG (ref 27–33)
MCHC RBC AUTO-ENTMCNC: 32.7 G/DL (ref 33–37)
MCV RBC AUTO: 87.8 FL (ref 80–94)
MONOCYTES # BLD AUTO: 0.5 10*3/MM3 (ref 0.1–0.9)
MONOCYTES NFR BLD AUTO: 6.5 % (ref 0–10)
NEUTROPHILS # BLD AUTO: 3.49 10*3/MM3 (ref 1.4–6.5)
NEUTROPHILS NFR BLD AUTO: 45.7 % (ref 30–70)
OSMOLALITY SERPL CALC.SUM OF ELEC: 283 MOSM/KG (ref 273–305)
PLATELET # BLD AUTO: 115 10*3/MM3 (ref 130–400)
PMV BLD AUTO: 11.1 FL (ref 6–10)
POTASSIUM BLD-SCNC: 3.4 MMOL/L (ref 3.5–5.3)
PROT SERPL-MCNC: 6.7 G/DL (ref 6–8)
RBC # BLD AUTO: 4.49 10*6/MM3 (ref 4.2–5.4)
SODIUM BLD-SCNC: 142 MMOL/L (ref 135–153)
WBC NRBC COR # BLD: 7.65 10*3/MM3 (ref 4.5–12.5)

## 2018-01-15 PROCEDURE — 25010000002 HEPARIN FLUSH (PORCINE) 100 UNIT/ML SOLUTION

## 2018-01-15 PROCEDURE — 99284 EMERGENCY DEPT VISIT MOD MDM: CPT

## 2018-01-15 PROCEDURE — 96365 THER/PROPH/DIAG IV INF INIT: CPT

## 2018-01-15 PROCEDURE — 25010000002 BUTORPHANOL PER 1 MG: Performed by: FAMILY MEDICINE

## 2018-01-15 PROCEDURE — 96375 TX/PRO/DX INJ NEW DRUG ADDON: CPT

## 2018-01-15 PROCEDURE — 25010000002 DEXAMETHASONE PER 1 MG: Performed by: FAMILY MEDICINE

## 2018-01-15 PROCEDURE — 85025 COMPLETE CBC W/AUTO DIFF WBC: CPT | Performed by: FAMILY MEDICINE

## 2018-01-15 PROCEDURE — 25010000002 PROMETHAZINE PER 50 MG: Performed by: FAMILY MEDICINE

## 2018-01-15 PROCEDURE — 80053 COMPREHEN METABOLIC PANEL: CPT | Performed by: FAMILY MEDICINE

## 2018-01-15 PROCEDURE — 25010000002 HYDROMORPHONE PER 4 MG: Performed by: FAMILY MEDICINE

## 2018-01-15 RX ORDER — LORAZEPAM 0.5 MG/1
0.5 TABLET ORAL ONCE
Status: COMPLETED | OUTPATIENT
Start: 2018-01-15 | End: 2018-01-15

## 2018-01-15 RX ORDER — HYDROMORPHONE HYDROCHLORIDE 1 MG/ML
0.5 INJECTION, SOLUTION INTRAMUSCULAR; INTRAVENOUS; SUBCUTANEOUS ONCE
Status: COMPLETED | OUTPATIENT
Start: 2018-01-15 | End: 2018-01-15

## 2018-01-15 RX ORDER — DEXAMETHASONE SODIUM PHOSPHATE 4 MG/ML
8 INJECTION, SOLUTION INTRA-ARTICULAR; INTRALESIONAL; INTRAMUSCULAR; INTRAVENOUS; SOFT TISSUE ONCE
Status: DISCONTINUED | OUTPATIENT
Start: 2018-01-15 | End: 2018-01-15 | Stop reason: DRUGHIGH

## 2018-01-15 RX ORDER — PROMETHAZINE HYDROCHLORIDE 25 MG/ML
12.5 INJECTION, SOLUTION INTRAMUSCULAR; INTRAVENOUS ONCE
Status: COMPLETED | OUTPATIENT
Start: 2018-01-15 | End: 2018-01-15

## 2018-01-15 RX ADMIN — LORAZEPAM 0.5 MG: 0.5 TABLET ORAL at 06:45

## 2018-01-15 RX ADMIN — PROMETHAZINE HYDROCHLORIDE 12.5 MG: 25 INJECTION INTRAMUSCULAR; INTRAVENOUS at 05:39

## 2018-01-15 RX ADMIN — SODIUM CHLORIDE 1000 ML: 9 INJECTION, SOLUTION INTRAVENOUS at 05:39

## 2018-01-15 RX ADMIN — BUTORPHANOL TARTRATE 2 MG: 2 INJECTION, SOLUTION INTRAMUSCULAR; INTRAVENOUS at 05:40

## 2018-01-15 RX ADMIN — DEXAMETHASONE SODIUM PHOSPHATE 10 MG: 4 INJECTION, SOLUTION INTRAMUSCULAR; INTRAVENOUS at 05:42

## 2018-01-15 RX ADMIN — HYDROMORPHONE HYDROCHLORIDE 0.5 MG: 1 INJECTION, SOLUTION INTRAMUSCULAR; INTRAVENOUS; SUBCUTANEOUS at 07:34

## 2018-01-15 RX ADMIN — SODIUM CHLORIDE, PRESERVATIVE FREE 300 UNITS: 5 INJECTION INTRAVENOUS at 07:58

## 2018-01-15 NOTE — ED NOTES
Pt rating headache discomfort as a 5 on 1-10 pain scale, dr cadena at bedside, speaking to pt, new order to be noted     Melva Garrison RN  01/15/18 8416

## 2018-01-15 NOTE — ED NOTES
Pt left er ambulatory with  driving her home, no complaints voiced, pt states she is feeling much better     Melva Garrison RN  01/15/18 8570

## 2018-01-15 NOTE — ED NOTES
Port was de-accessed after being properly flushed, had great blood return prior and flushed without any difficulty, pt tolerated well     Melva Garrison RN  01/15/18 5719

## 2018-01-15 NOTE — ED PROVIDER NOTES
Subjective   HPI Comments: PT says since the untimely death of her mother her headache have been becoming more frequent because she is not sleeping well; says she just can not get closure on the death of her mother; says she left her with the diagnosis of pneumonia and then the next time she sees her mother is when the ems had to stop the transfer to [facility] [place] [facility] because her mother has coded. She says when she sleeps she dreams she is talking to her mother who is asking for forgiveness -- says they had a very rough and complicated relationship- and she knows it isnt real but things she would tell her if she could and she then gets her headaches - and her headaches she tries all her medications that the neurologist has her on -- they just dont help and she knows it sounds crazy but she just wants the pain toleratable to where she can sleep and rest.      DIscussed giving pt something for her nerves and anxiety- pt says she can take ativan and that she isnt allergic and would love something that may calm her down- will give her an oral dose to help with her PTSD and anxiety.    Patient is a 42 y.o. female presenting with migraines and anxiety.   History provided by:  Patient and spouse  Migraine   Pain location:  Generalized  Quality:  Sharp  Radiates to:  Does not radiate  Severity currently:  10/10  Severity at highest:  10/10  Onset quality:  Gradual  Timing:  Constant  Progression:  Unchanged  Chronicity:  Recurrent  Similar to prior headaches: yes    Context: activity, bright light and loud noise    Relieved by:  Nothing  Worsened by:  Activity and light  Ineffective treatments:  Prescription medications  Associated symptoms: nausea    Associated symptoms: no abdominal pain, no congestion, no cough, no diarrhea, no dizziness, no eye pain, no fatigue, no myalgias, no neck pain, no neck stiffness, no vomiting and no weakness    Risk factors: insomnia    Anxiety   Presents for initial visit. Symptoms include  nausea. Patient reports no chest pain, dizziness or shortness of breath.           Review of Systems   Constitutional: Negative for activity change, appetite change, chills and fatigue.   HENT: Negative for congestion.    Eyes: Negative for pain.   Respiratory: Negative for cough, shortness of breath, wheezing and stridor.    Cardiovascular: Negative for chest pain.   Gastrointestinal: Positive for nausea. Negative for abdominal pain, diarrhea and vomiting.   Genitourinary: Negative for dysuria.   Musculoskeletal: Negative for arthralgias, myalgias, neck pain and neck stiffness.   Skin: Negative for rash.   Neurological: Negative for dizziness, syncope, speech difficulty, weakness and headaches.   Psychiatric/Behavioral: Negative for agitation.   All other systems reviewed and are negative.      Past Medical History:   Diagnosis Date   • Abdominal pain    • Abdominal swelling    • Hoyos's disease    • Bipolar 1 disorder    • Brain tumor     R Frontal Lobe per pt   • Brain tumor 2014   • Brain tumor (benign)    • Constipation    • DDD (degenerative disc disease), cervical 05/29/2017   • DDD (degenerative disc disease), cervical    • Diarrhea    • Fibromyalgia    • IBS (irritable bowel syndrome)    • Migraine    • Nausea & vomiting    • PONV (postoperative nausea and vomiting)    • PTSD (post-traumatic stress disorder)    • Rectal bleeding        Allergies   Allergen Reactions   • Ativan [Lorazepam] Hallucinations     confusion   • Sulfa Antibiotics Shortness Of Breath and Swelling   • Compazine [Prochlorperazine Edisylate] Hives   • Demerol [Meperidine] Hives   • Droperidol Itching   • Metoclopramide Swelling   • Toradol [Ketorolac Tromethamine] Hives and Itching   • Zofran [Ondansetron Hcl] Rash       Past Surgical History:   Procedure Laterality Date   • ANAL SCOPE N/A 7/28/2016    Procedure: ANAL SCOPE;  Surgeon: Kael Lopez MD;  Location: [facility];  Service:    • APPENDECTOMY     • COLONOSCOPY N/A 6/30/2016     Procedure: COLONOSCOPY  CPTCODE:49953;  Surgeon: Jose Antonio Belle III, MD;  Location: [facility] OR;  Service:    • COLONOSCOPY N/A 7/7/2016    Procedure: COLONOSCOPY (73492) CPT;  Surgeon: Jose Antonio Belle III, MD;  Location: [facility] OR;  Service:    • CYSTOSCOPY RETROGRADE PYELOGRAM Bilateral 4/28/2017    Procedure: CYSTOSCOPY RETROGRADE PYELOGRAM;  Surgeon: Mu Blunt MD;  Location: [facility] OR;  Service:    • ENDOSCOPY N/A 6/30/2016    Procedure: ESOPHAGOGASTRODUODENOSCOPY WITH BIOPSY  CPTCODE:27019;  Surgeon: Jose Antonio Belle III, MD;  Location: [facility] OR;  Service:    • HEMORRHOIDECTOMY N/A 7/28/2016    Procedure: HEMORRHOID STAPLING;  Surgeon: Kael Lopez MD;  Location: [facility] OR;  Service:    • HYSTERECTOMY     • KNEE SURGERY     • LAPAROSCOPIC SALPINGOOPHERECTOMY     • PORTACATH PLACEMENT N/A 7/28/2017    Procedure: INSERTION OF PORTACATH;  Surgeon: Celso Arredondo MD;  Location: [facility] OR;  Service:    • SHOULDER SURGERY      3 times       Family History   Problem Relation Age of Onset   • Crohn's disease Other    • Hypertension Other    • Diabetes Other    • Irritable bowel syndrome Other        Social History     Social History   • Marital status:      Spouse name: N/A   • Number of children: N/A   • Years of education: N/A     Social History Main Topics   • Smoking status: Former Smoker     Packs/day: 1.00     Years: 20.00     Quit date: 11/1/2015   • Smokeless tobacco: Never Used   • Alcohol use No   • Drug use: Yes     Special: Marijuana      Comment: for pain   • Sexual activity: Defer     Other Topics Concern   • None     Social History Narrative           Objective   Physical Exam   Constitutional: She is oriented to person, place, and time. She appears well-developed and well-nourished.   HENT:   Head: Normocephalic and atraumatic.   Right Ear: External ear normal.   Left Ear: External ear normal.   Nose: Nose normal.   Mouth/Throat: Oropharynx is clear and moist.    Eyes: EOM are normal. Pupils are equal, round, and reactive to light.   Neck: Normal range of motion. Neck supple.   Cardiovascular: Normal rate and regular rhythm.    Pulmonary/Chest: Effort normal and breath sounds normal.   Abdominal: Soft. Bowel sounds are normal. She exhibits no distension. There is no tenderness.   Musculoskeletal: Normal range of motion.   Neurological: She is alert and oriented to person, place, and time.   Skin: Skin is warm.   Psychiatric: Her speech is normal and behavior is normal. Judgment and thought content normal. Her mood appears anxious. She exhibits a depressed mood.   Nursing note and vitals reviewed.      Procedures         ED Course  ED Course                  MDM  Number of Diagnoses or Management Options  Anxiety: new and does not require workup  Chronic migraine without aura without status migrainosus, not intractable: new and does not require workup     Amount and/or Complexity of Data Reviewed  Clinical lab tests: ordered and reviewed  Tests in the radiology section of CPT®: ordered and reviewed  Tests in the medicine section of CPT®: reviewed and ordered  Independent visualization of images, tracings, or specimens: yes    Risk of Complications, Morbidity, and/or Mortality  Presenting problems: moderate  Diagnostic procedures: moderate  Management options: moderate    Patient Progress  Patient progress: improved      Final diagnoses:   Chronic migraine without aura without status migrainosus, not intractable   Anxiety            Rowena Fuller DO  01/15/18 8073

## 2018-01-19 ENCOUNTER — HOSPITAL ENCOUNTER (EMERGENCY)
Facility: HOSPITAL | Age: 43
Discharge: LEFT BEFORE DISCHARGE | End: 2018-01-19

## 2018-01-22 ENCOUNTER — APPOINTMENT (OUTPATIENT)
Dept: CT IMAGING | Facility: HOSPITAL | Age: 43
End: 2018-01-22

## 2018-01-22 ENCOUNTER — HOSPITAL ENCOUNTER (EMERGENCY)
Facility: HOSPITAL | Age: 43
Discharge: HOME | End: 2018-01-22
Admitting: EMERGENCY MEDICINE

## 2018-01-22 VITALS
OXYGEN SATURATION: 98 % | DIASTOLIC BLOOD PRESSURE: 98 MMHG | BODY MASS INDEX: 24.14 KG/M2 | RESPIRATION RATE: 18 BRPM | SYSTOLIC BLOOD PRESSURE: 136 MMHG | HEART RATE: 78 BPM | TEMPERATURE: 98 F | WEIGHT: 163 LBS | HEIGHT: 69 IN

## 2018-01-22 DIAGNOSIS — N23 RENAL COLIC: Primary | ICD-10-CM

## 2018-01-22 LAB
ALBUMIN SERPL-MCNC: 4.1 G/DL (ref 3.5–5)
ALBUMIN/GLOB SERPL: 1.3 G/DL (ref 1.5–2.5)
ALP SERPL-CCNC: 75 U/L (ref 35–104)
ALT SERPL W P-5'-P-CCNC: 46 U/L (ref 10–36)
ANION GAP SERPL CALCULATED.3IONS-SCNC: 8.3 MMOL/L (ref 3.6–11.2)
AST SERPL-CCNC: 40 U/L (ref 10–30)
BACTERIA UR QL AUTO: ABNORMAL /HPF
BASOPHILS # BLD AUTO: 0.01 10*3/MM3 (ref 0–0.3)
BASOPHILS NFR BLD AUTO: 0.1 % (ref 0–2)
BILIRUB SERPL-MCNC: 0.6 MG/DL (ref 0.2–1.8)
BILIRUB UR QL STRIP: NEGATIVE
BUN BLD-MCNC: 6 MG/DL (ref 7–21)
BUN/CREAT SERPL: 8.5 (ref 7–25)
CALCIUM SPEC-SCNC: 8.8 MG/DL (ref 7.7–10)
CHLORIDE SERPL-SCNC: 108 MMOL/L (ref 99–112)
CLARITY UR: ABNORMAL
CO2 SERPL-SCNC: 20.7 MMOL/L (ref 24.3–31.9)
COLOR UR: ABNORMAL
CREAT BLD-MCNC: 0.71 MG/DL (ref 0.43–1.29)
DEPRECATED RDW RBC AUTO: 47.7 FL (ref 37–54)
EOSINOPHIL # BLD AUTO: 0.05 10*3/MM3 (ref 0–0.7)
EOSINOPHIL NFR BLD AUTO: 0.6 % (ref 0–5)
ERYTHROCYTE [DISTWIDTH] IN BLOOD BY AUTOMATED COUNT: 15.2 % (ref 11.5–14.5)
GFR SERPL CREATININE-BSD FRML MDRD: 90 ML/MIN/1.73
GLOBULIN UR ELPH-MCNC: 3.1 GM/DL
GLUCOSE BLD-MCNC: 101 MG/DL (ref 70–110)
GLUCOSE BLDC GLUCOMTR-MCNC: 104 MG/DL (ref 70–130)
GLUCOSE UR STRIP-MCNC: NEGATIVE MG/DL
HCT VFR BLD AUTO: 42.3 % (ref 37–47)
HGB BLD-MCNC: 13.7 G/DL (ref 12–16)
HGB UR QL STRIP.AUTO: ABNORMAL
HYALINE CASTS UR QL AUTO: ABNORMAL /LPF
IMM GRANULOCYTES # BLD: 0.02 10*3/MM3 (ref 0–0.03)
IMM GRANULOCYTES NFR BLD: 0.2 % (ref 0–0.5)
KETONES UR QL STRIP: ABNORMAL
LEUKOCYTE ESTERASE UR QL STRIP.AUTO: ABNORMAL
LIPASE SERPL-CCNC: 61 U/L (ref 13–60)
LYMPHOCYTES # BLD AUTO: 3.28 10*3/MM3 (ref 1–3)
LYMPHOCYTES NFR BLD AUTO: 40.8 % (ref 21–51)
MCH RBC QN AUTO: 28.8 PG (ref 27–33)
MCHC RBC AUTO-ENTMCNC: 32.4 G/DL (ref 33–37)
MCV RBC AUTO: 88.9 FL (ref 80–94)
MONOCYTES # BLD AUTO: 0.64 10*3/MM3 (ref 0.1–0.9)
MONOCYTES NFR BLD AUTO: 8 % (ref 0–10)
NEUTROPHILS # BLD AUTO: 4.04 10*3/MM3 (ref 1.4–6.5)
NEUTROPHILS NFR BLD AUTO: 50.3 % (ref 30–70)
NITRITE UR QL STRIP: NEGATIVE
OSMOLALITY SERPL CALC.SUM OF ELEC: 271.6 MOSM/KG (ref 273–305)
PH UR STRIP.AUTO: <=5 [PH] (ref 5–8)
PLATELET # BLD AUTO: 160 10*3/MM3 (ref 130–400)
PMV BLD AUTO: 10.7 FL (ref 6–10)
POTASSIUM BLD-SCNC: 3 MMOL/L (ref 3.5–5.3)
PROT SERPL-MCNC: 7.2 G/DL (ref 6–8)
PROT UR QL STRIP: NEGATIVE
RBC # BLD AUTO: 4.76 10*6/MM3 (ref 4.2–5.4)
RBC # UR: ABNORMAL /HPF
REF LAB TEST METHOD: ABNORMAL
SODIUM BLD-SCNC: 137 MMOL/L (ref 135–153)
SP GR UR STRIP: 1.02 (ref 1–1.03)
SQUAMOUS #/AREA URNS HPF: ABNORMAL /HPF
UROBILINOGEN UR QL STRIP: ABNORMAL
WBC NRBC COR # BLD: 8.04 10*3/MM3 (ref 4.5–12.5)
WBC UR QL AUTO: ABNORMAL /HPF
WHOLE BLOOD HOLD SPECIMEN: NORMAL
WHOLE BLOOD HOLD SPECIMEN: NORMAL

## 2018-01-22 PROCEDURE — 74176 CT ABD & PELVIS W/O CONTRAST: CPT

## 2018-01-22 PROCEDURE — 83690 ASSAY OF LIPASE: CPT | Performed by: EMERGENCY MEDICINE

## 2018-01-22 PROCEDURE — 25010000002 BUTORPHANOL PER 1 MG: Performed by: NURSE PRACTITIONER

## 2018-01-22 PROCEDURE — 74176 CT ABD & PELVIS W/O CONTRAST: CPT | Performed by: RADIOLOGY

## 2018-01-22 PROCEDURE — 25010000002 PROMETHAZINE PER 50 MG: Performed by: NURSE PRACTITIONER

## 2018-01-22 PROCEDURE — 81001 URINALYSIS AUTO W/SCOPE: CPT | Performed by: EMERGENCY MEDICINE

## 2018-01-22 PROCEDURE — 82962 GLUCOSE BLOOD TEST: CPT

## 2018-01-22 PROCEDURE — 96372 THER/PROPH/DIAG INJ SC/IM: CPT

## 2018-01-22 PROCEDURE — 80053 COMPREHEN METABOLIC PANEL: CPT | Performed by: EMERGENCY MEDICINE

## 2018-01-22 PROCEDURE — 85025 COMPLETE CBC W/AUTO DIFF WBC: CPT | Performed by: EMERGENCY MEDICINE

## 2018-01-22 PROCEDURE — 87086 URINE CULTURE/COLONY COUNT: CPT | Performed by: EMERGENCY MEDICINE

## 2018-01-22 PROCEDURE — 99284 EMERGENCY DEPT VISIT MOD MDM: CPT

## 2018-01-22 RX ORDER — SODIUM CHLORIDE 0.9 % (FLUSH) 0.9 %
10 SYRINGE (ML) INJECTION AS NEEDED
Status: DISCONTINUED | OUTPATIENT
Start: 2018-01-22 | End: 2018-01-22 | Stop reason: HOSPADM

## 2018-01-22 RX ORDER — PROMETHAZINE HYDROCHLORIDE 25 MG/ML
12.5 INJECTION, SOLUTION INTRAMUSCULAR; INTRAVENOUS ONCE
Status: COMPLETED | OUTPATIENT
Start: 2018-01-22 | End: 2018-01-22

## 2018-01-22 RX ADMIN — BUTORPHANOL TARTRATE 2 MG: 2 INJECTION, SOLUTION INTRAMUSCULAR; INTRAVENOUS at 14:59

## 2018-01-22 RX ADMIN — PROMETHAZINE HYDROCHLORIDE 12.5 MG: 25 INJECTION INTRAMUSCULAR; INTRAVENOUS at 14:59

## 2018-01-22 NOTE — ED NOTES
Attempted to reassess patient at this time however pt noted to be outside smoking. Will attempt to reassess again      Lois Kaplan RN  01/22/18 4810

## 2018-01-22 NOTE — ED NOTES
"Patient noted to be slightly aggravated stating she was told she would be the next one to be taken back to a room and states \"two other people have gone ahead of me\"; informed pt that I could not discuss others conditions with her but some situations require immediate care. Pt verbalizes understanding at this time; No acute changes in condition noted. Pt alert and oriented x4. Informed patient that we were at a high volume with high acuity level of patient's in ED and that that was the reason for the extended wait time. Educated patient to let ED staff know if any changes in condition occur while sitting in ED lobby. Pt verbalizes understanding and denies any questions/concerns.      Lois Kaplan RN  01/22/18 3786    "

## 2018-01-22 NOTE — ED PROVIDER NOTES
Subjective   Patient is a 42 y.o. female presenting with flank pain.   History provided by:  Patient   used: No    Flank Pain   Pain location:  R flank  Pain radiates to:  RLQ  Pain severity:  Moderate  Onset quality:  Sudden  Duration:  1 day  Timing:  Constant  Progression:  Waxing and waning  Chronicity:  New  Context: previous surgery    Context: not alcohol use, not awakening from sleep, not diet changes, not medication withdrawal, not recent illness, not recent travel, not retching, not suspicious food intake and not trauma    Relieved by:  None tried  Worsened by:  Nothing  Ineffective treatments:  None tried  Associated symptoms: dysuria    Associated symptoms: no anorexia, no chest pain, no chills, no constipation, no cough, no diarrhea, no fatigue, no fever, no flatus, no hematemesis, no hematochezia, no melena, no shortness of breath and no vaginal discharge    Risk factors: pregnancy    Risk factors: no alcohol abuse, no aspirin use, no NSAID use and no recent hospitalization        Review of Systems   Constitutional: Negative.  Negative for chills, fatigue and fever.   HENT: Negative.    Eyes: Negative.    Respiratory: Negative.  Negative for cough and shortness of breath.    Cardiovascular: Negative.  Negative for chest pain.   Gastrointestinal: Negative.  Negative for anorexia, constipation, diarrhea, flatus, hematemesis, hematochezia and melena.   Endocrine: Negative.    Genitourinary: Positive for dysuria and flank pain. Negative for vaginal discharge.   Skin: Negative.    Allergic/Immunologic: Negative.    Neurological: Negative.    Hematological: Negative.    Psychiatric/Behavioral: Negative.        Past Medical History:   Diagnosis Date   • Abdominal pain    • Abdominal swelling    • Hoyos's disease    • Bipolar 1 disorder    • Brain tumor     R Frontal Lobe per pt   • Brain tumor 2014   • Brain tumor (benign)    • Constipation    • DDD (degenerative disc disease), cervical  05/29/2017   • DDD (degenerative disc disease), cervical    • Diarrhea    • Fibromyalgia    • IBS (irritable bowel syndrome)    • Migraine    • Nausea & vomiting    • PONV (postoperative nausea and vomiting)    • PTSD (post-traumatic stress disorder)    • Rectal bleeding        Allergies   Allergen Reactions   • Ativan [Lorazepam] Hallucinations     confusion   • Sulfa Antibiotics Shortness Of Breath and Swelling   • Compazine [Prochlorperazine Edisylate] Hives   • Demerol [Meperidine] Hives   • Droperidol Itching   • Metoclopramide Swelling   • Toradol [Ketorolac Tromethamine] Hives and Itching   • Zofran [Ondansetron Hcl] Rash       Past Surgical History:   Procedure Laterality Date   • ANAL SCOPE N/A 7/28/2016    Procedure: ANAL SCOPE;  Surgeon: Kael Lopez MD;  Location: [facility] OR;  Service:    • APPENDECTOMY     • COLONOSCOPY N/A 6/30/2016    Procedure: COLONOSCOPY  CPTCODE:63642;  Surgeon: Jose Antonio Belle III, MD;  Location: [facility] OR;  Service:    • COLONOSCOPY N/A 7/7/2016    Procedure: COLONOSCOPY (79439) CPT;  Surgeon: Jose Antonio Belle III, MD;  Location: [facility] OR;  Service:    • CYSTOSCOPY RETROGRADE PYELOGRAM Bilateral 4/28/2017    Procedure: CYSTOSCOPY RETROGRADE PYELOGRAM;  Surgeon: Mu Blunt MD;  Location: [facility] OR;  Service:    • ENDOSCOPY N/A 6/30/2016    Procedure: ESOPHAGOGASTRODUODENOSCOPY WITH BIOPSY  CPTCODE:61309;  Surgeon: Jose Antonio Belle III, MD;  Location: [facility] OR;  Service:    • HEMORRHOIDECTOMY N/A 7/28/2016    Procedure: HEMORRHOID STAPLING;  Surgeon: Kael Lopez MD;  Location: [facility] OR;  Service:    • HYSTERECTOMY     • KNEE SURGERY     • LAPAROSCOPIC SALPINGOOPHERECTOMY     • PORTACATH PLACEMENT N/A 7/28/2017    Procedure: INSERTION OF PORTACATH;  Surgeon: Celso Arredondo MD;  Location: [facility] OR;  Service:    • SHOULDER SURGERY      3 times       Family History   Problem Relation Age of Onset   • Crohn's disease Other    • Hypertension Other     • Diabetes Other    • Irritable bowel syndrome Other        Social History     Social History   • Marital status:      Spouse name: N/A   • Number of children: N/A   • Years of education: N/A     Social History Main Topics   • Smoking status: Former Smoker     Packs/day: 1.00     Years: 20.00     Quit date: 11/1/2015   • Smokeless tobacco: Never Used   • Alcohol use No   • Drug use: Yes     Special: Marijuana      Comment: for pain   • Sexual activity: Defer     Other Topics Concern   • None     Social History Narrative           Objective   Physical Exam   Constitutional: She is oriented to person, place, and time. She appears well-developed and well-nourished.   HENT:   Head: Normocephalic.   Right Ear: External ear normal.   Left Ear: External ear normal.   Mouth/Throat: Oropharynx is clear and moist.   Eyes: EOM are normal. Pupils are equal, round, and reactive to light.   Neck: Normal range of motion. Neck supple.   Cardiovascular: Normal rate and regular rhythm.    Pulmonary/Chest: Effort normal and breath sounds normal.   Abdominal: Soft. Bowel sounds are normal.   Musculoskeletal: Normal range of motion.   Neurological: She is alert and oriented to person, place, and time.   Skin: Skin is warm and dry.   Psychiatric: She has a normal mood and affect. Her behavior is normal.   Nursing note and vitals reviewed.      Procedures         ED Course  ED Course                  MDM    Final diagnoses:   Renal colic            MIGDALIA Fuentes  01/22/18 1552       Ja Suarez, MIGDALIA  01/22/18 1552

## 2018-01-25 LAB — BACTERIA SPEC AEROBE CULT: NORMAL

## 2018-01-26 ENCOUNTER — APPOINTMENT (OUTPATIENT)
Dept: CT IMAGING | Facility: HOSPITAL | Age: 43
End: 2018-01-26

## 2018-01-26 ENCOUNTER — HOSPITAL ENCOUNTER (EMERGENCY)
Facility: HOSPITAL | Age: 43
Discharge: HOME | End: 2018-01-26
Admitting: NURSE PRACTITIONER

## 2018-01-26 ENCOUNTER — APPOINTMENT (OUTPATIENT)
Dept: GENERAL RADIOLOGY | Facility: HOSPITAL | Age: 43
End: 2018-01-26

## 2018-01-26 VITALS
BODY MASS INDEX: 24.14 KG/M2 | SYSTOLIC BLOOD PRESSURE: 114 MMHG | DIASTOLIC BLOOD PRESSURE: 79 MMHG | TEMPERATURE: 97.2 F | HEIGHT: 69 IN | WEIGHT: 163 LBS | HEART RATE: 77 BPM | RESPIRATION RATE: 18 BRPM | OXYGEN SATURATION: 98 %

## 2018-01-26 DIAGNOSIS — S09.93XA FACIAL INJURY, INITIAL ENCOUNTER: Primary | ICD-10-CM

## 2018-01-26 LAB
BACTERIA UR QL AUTO: ABNORMAL /HPF
BILIRUB UR QL STRIP: NEGATIVE
CLARITY UR: CLEAR
COLOR UR: YELLOW
GLUCOSE UR STRIP-MCNC: NEGATIVE MG/DL
HGB UR QL STRIP.AUTO: NEGATIVE
HYALINE CASTS UR QL AUTO: ABNORMAL /LPF
KETONES UR QL STRIP: NEGATIVE
LEUKOCYTE ESTERASE UR QL STRIP.AUTO: ABNORMAL
NITRITE UR QL STRIP: NEGATIVE
PH UR STRIP.AUTO: 7.5 [PH] (ref 5–8)
PROT UR QL STRIP: NEGATIVE
RBC # UR: ABNORMAL /HPF
REF LAB TEST METHOD: ABNORMAL
SP GR UR STRIP: 1.02 (ref 1–1.03)
SQUAMOUS #/AREA URNS HPF: ABNORMAL /HPF
UROBILINOGEN UR QL STRIP: ABNORMAL
WBC UR QL AUTO: ABNORMAL /HPF

## 2018-01-26 PROCEDURE — 25010000002 PROMETHAZINE PER 50 MG: Performed by: NURSE PRACTITIONER

## 2018-01-26 PROCEDURE — 70486 CT MAXILLOFACIAL W/O DYE: CPT | Performed by: RADIOLOGY

## 2018-01-26 PROCEDURE — 81001 URINALYSIS AUTO W/SCOPE: CPT | Performed by: NURSE PRACTITIONER

## 2018-01-26 PROCEDURE — 87086 URINE CULTURE/COLONY COUNT: CPT | Performed by: NURSE PRACTITIONER

## 2018-01-26 PROCEDURE — 25010000002 DEXAMETHASONE PER 1 MG: Performed by: NURSE PRACTITIONER

## 2018-01-26 PROCEDURE — 96372 THER/PROPH/DIAG INJ SC/IM: CPT

## 2018-01-26 PROCEDURE — 73030 X-RAY EXAM OF SHOULDER: CPT | Performed by: RADIOLOGY

## 2018-01-26 PROCEDURE — 70486 CT MAXILLOFACIAL W/O DYE: CPT

## 2018-01-26 PROCEDURE — 25010000002 BUTORPHANOL PER 1 MG: Performed by: NURSE PRACTITIONER

## 2018-01-26 PROCEDURE — 99283 EMERGENCY DEPT VISIT LOW MDM: CPT

## 2018-01-26 PROCEDURE — 73030 X-RAY EXAM OF SHOULDER: CPT

## 2018-01-26 RX ORDER — DEXAMETHASONE SODIUM PHOSPHATE 4 MG/ML
4 INJECTION, SOLUTION INTRA-ARTICULAR; INTRALESIONAL; INTRAMUSCULAR; INTRAVENOUS; SOFT TISSUE ONCE
Status: COMPLETED | OUTPATIENT
Start: 2018-01-26 | End: 2018-01-26

## 2018-01-26 RX ORDER — PROMETHAZINE HYDROCHLORIDE 25 MG/ML
12.5 INJECTION, SOLUTION INTRAMUSCULAR; INTRAVENOUS ONCE
Status: COMPLETED | OUTPATIENT
Start: 2018-01-26 | End: 2018-01-26

## 2018-01-26 RX ORDER — BUTORPHANOL TARTRATE 1 MG/ML
1 INJECTION, SOLUTION INTRAMUSCULAR; INTRAVENOUS ONCE
Status: COMPLETED | OUTPATIENT
Start: 2018-01-26 | End: 2018-01-26

## 2018-01-26 RX ADMIN — BUTORPHANOL TARTRATE 1 MG: 1 INJECTION, SOLUTION INTRAMUSCULAR; INTRAVENOUS at 21:12

## 2018-01-26 RX ADMIN — PROMETHAZINE HYDROCHLORIDE 12.5 MG: 25 INJECTION INTRAMUSCULAR; INTRAVENOUS at 19:01

## 2018-01-26 RX ADMIN — BUTORPHANOL TARTRATE 2 MG: 2 INJECTION, SOLUTION INTRAMUSCULAR; INTRAVENOUS at 19:00

## 2018-01-26 RX ADMIN — DEXAMETHASONE SODIUM PHOSPHATE 4 MG: 4 INJECTION, SOLUTION INTRA-ARTICULAR; INTRALESIONAL; INTRAMUSCULAR; INTRAVENOUS; SOFT TISSUE at 21:14

## 2018-01-27 NOTE — ED PROVIDER NOTES
Subjective   Patient is a 43 y.o. female presenting with facial injury.   History provided by:  Patient   used: No    Facial Injury   Mechanism of injury:  Direct blow  Location:  Face and L cheek  Time since incident:  2 days  Pain details:     Quality:  Aching    Severity:  Moderate    Duration:  2 days    Timing:  Constant    Progression:  Waxing and waning  Relieved by:  Nothing  Worsened by:  Nothing  Ineffective treatments:  None tried  Associated symptoms: double vision    Associated symptoms: no altered mental status, no difficulty breathing, no epistaxis, no loss of consciousness, no malocclusion, no nausea, no neck pain, no rhinorrhea, no trismus and no vomiting    Risk factors: no alcohol use, no bone disorder, no frequent falls and no prior injuries to these areas        Review of Systems   Constitutional: Negative.    HENT: Negative.  Negative for nosebleeds and rhinorrhea.    Eyes: Positive for double vision.   Respiratory: Negative.    Cardiovascular: Negative.    Gastrointestinal: Negative.  Negative for nausea and vomiting.   Endocrine: Negative.    Genitourinary: Negative.    Musculoskeletal: Negative.  Negative for neck pain.   Skin: Negative.    Allergic/Immunologic: Negative.    Neurological: Negative.  Negative for loss of consciousness.   Hematological: Negative.    Psychiatric/Behavioral: Negative.        Past Medical History:   Diagnosis Date   • Abdominal pain    • Abdominal swelling    • Hoyos's disease    • Bipolar 1 disorder    • Brain tumor     R Frontal Lobe per pt   • Brain tumor 2014   • Brain tumor (benign)    • Constipation    • DDD (degenerative disc disease), cervical 05/29/2017   • DDD (degenerative disc disease), cervical    • Diarrhea    • Fibromyalgia    • IBS (irritable bowel syndrome)    • Migraine    • Nausea & vomiting    • PONV (postoperative nausea and vomiting)    • PTSD (post-traumatic stress disorder)    • Rectal bleeding        Allergies    Allergen Reactions   • Ativan [Lorazepam] Hallucinations     confusion   • Sulfa Antibiotics Shortness Of Breath and Swelling   • Compazine [Prochlorperazine Edisylate] Hives   • Demerol [Meperidine] Hives   • Droperidol Itching   • Metoclopramide Swelling   • Toradol [Ketorolac Tromethamine] Hives and Itching   • Zofran [Ondansetron Hcl] Rash       Past Surgical History:   Procedure Laterality Date   • ANAL SCOPE N/A 7/28/2016    Procedure: ANAL SCOPE;  Surgeon: Kael Lopez MD;  Location: [facility] OR;  Service:    • APPENDECTOMY     • COLONOSCOPY N/A 6/30/2016    Procedure: COLONOSCOPY  CPTCODE:02790;  Surgeon: Jose Antonio Belle III, MD;  Location: [facility] OR;  Service:    • COLONOSCOPY N/A 7/7/2016    Procedure: COLONOSCOPY (62098) CPT;  Surgeon: Jose Antonio Belle III, MD;  Location: [facility] OR;  Service:    • CYSTOSCOPY RETROGRADE PYELOGRAM Bilateral 4/28/2017    Procedure: CYSTOSCOPY RETROGRADE PYELOGRAM;  Surgeon: Mu Blunt MD;  Location: [facility] OR;  Service:    • ENDOSCOPY N/A 6/30/2016    Procedure: ESOPHAGOGASTRODUODENOSCOPY WITH BIOPSY  CPTCODE:17974;  Surgeon: Jose Antonio Belle III, MD;  Location: [facility] OR;  Service:    • HEMORRHOIDECTOMY N/A 7/28/2016    Procedure: HEMORRHOID STAPLING;  Surgeon: Kael Lopez MD;  Location: [facility] OR;  Service:    • HYSTERECTOMY     • KNEE SURGERY     • LAPAROSCOPIC SALPINGOOPHERECTOMY     • PORTACATH PLACEMENT N/A 7/28/2017    Procedure: INSERTION OF PORTACATH;  Surgeon: Celso Arredondo MD;  Location: [facility] OR;  Service:    • SHOULDER SURGERY      3 times       Family History   Problem Relation Age of Onset   • Crohn's disease Other    • Hypertension Other    • Diabetes Other    • Irritable bowel syndrome Other        Social History     Social History   • Marital status:      Spouse name: N/A   • Number of children: N/A   • Years of education: N/A     Social History Main Topics   • Smoking status: Former Smoker     Packs/day: 1.00      Years: 20.00     Quit date: 11/1/2015   • Smokeless tobacco: Never Used   • Alcohol use No   • Drug use: Yes     Special: Marijuana      Comment: for pain   • Sexual activity: Defer     Other Topics Concern   • Not on file     Social History Narrative           Objective   Physical Exam   Constitutional: She is oriented to person, place, and time. She appears well-developed and well-nourished.   HENT:   Head: Normocephalic.   Right Ear: External ear normal.   Left Ear: External ear normal.   Eyes: EOM are normal. Pupils are equal, round, and reactive to light.   Neck: Normal range of motion. Neck supple.   Cardiovascular: Normal rate, regular rhythm and normal heart sounds.    Pulmonary/Chest: Effort normal and breath sounds normal.   Abdominal: Soft. Bowel sounds are normal.   Musculoskeletal: Normal range of motion.   Neurological: She is alert and oriented to person, place, and time.   Skin: Skin is warm.   Ecchymosis left cheek; left periorbital area   Psychiatric: She has a normal mood and affect. Her behavior is normal. Thought content normal.   Nursing note and vitals reviewed.      Procedures         ED Course  ED Course                  MDM    Final diagnoses:   Facial injury, initial encounter            Ja Suarez, APRN  01/26/18 1469

## 2018-01-28 ENCOUNTER — TELEPHONE (OUTPATIENT)
Dept: EMERGENCY DEPT | Facility: HOSPITAL | Age: 43
End: 2018-01-28

## 2018-01-28 LAB — BACTERIA SPEC AEROBE CULT: NORMAL

## 2018-01-29 ENCOUNTER — HOSPITAL ENCOUNTER (EMERGENCY)
Facility: HOSPITAL | Age: 43
Discharge: HOME | End: 2018-01-29
Attending: FAMILY MEDICINE | Admitting: FAMILY MEDICINE

## 2018-01-29 ENCOUNTER — APPOINTMENT (OUTPATIENT)
Dept: GENERAL RADIOLOGY | Facility: HOSPITAL | Age: 43
End: 2018-01-29

## 2018-01-29 VITALS
HEIGHT: 69 IN | HEART RATE: 81 BPM | SYSTOLIC BLOOD PRESSURE: 111 MMHG | BODY MASS INDEX: 24.14 KG/M2 | OXYGEN SATURATION: 100 % | TEMPERATURE: 97.2 F | WEIGHT: 163 LBS | DIASTOLIC BLOOD PRESSURE: 70 MMHG | RESPIRATION RATE: 19 BRPM

## 2018-01-29 DIAGNOSIS — R11.15 NON-INTRACTABLE CYCLICAL VOMITING WITH NAUSEA: Primary | ICD-10-CM

## 2018-01-29 DIAGNOSIS — R07.9 CHEST PAIN IN ADULT: ICD-10-CM

## 2018-01-29 DIAGNOSIS — G43.011 INTRACTABLE MIGRAINE WITHOUT AURA AND WITH STATUS MIGRAINOSUS: ICD-10-CM

## 2018-01-29 LAB
ALBUMIN SERPL-MCNC: 4.7 G/DL (ref 3.5–5)
ALBUMIN/GLOB SERPL: 1.6 G/DL (ref 1.5–2.5)
ALP SERPL-CCNC: 73 U/L (ref 35–104)
ALT SERPL W P-5'-P-CCNC: 54 U/L (ref 10–36)
ANION GAP SERPL CALCULATED.3IONS-SCNC: 10 MMOL/L (ref 3.6–11.2)
AST SERPL-CCNC: 31 U/L (ref 10–30)
BASOPHILS # BLD AUTO: 0 10*3/MM3 (ref 0–0.3)
BASOPHILS NFR BLD AUTO: 0 % (ref 0–2)
BILIRUB SERPL-MCNC: 0.6 MG/DL (ref 0.2–1.8)
BUN BLD-MCNC: 10 MG/DL (ref 7–21)
BUN/CREAT SERPL: 17.9 (ref 7–25)
CALCIUM SPEC-SCNC: 9.7 MG/DL (ref 7.7–10)
CHLORIDE SERPL-SCNC: 109 MMOL/L (ref 99–112)
CK MB SERPL-CCNC: <0.18 NG/ML (ref 0–5)
CO2 SERPL-SCNC: 21 MMOL/L (ref 24.3–31.9)
CREAT BLD-MCNC: 0.56 MG/DL (ref 0.43–1.29)
DEPRECATED RDW RBC AUTO: 48.5 FL (ref 37–54)
EOSINOPHIL # BLD AUTO: 0 10*3/MM3 (ref 0–0.7)
EOSINOPHIL NFR BLD AUTO: 0 % (ref 0–5)
ERYTHROCYTE [DISTWIDTH] IN BLOOD BY AUTOMATED COUNT: 15.2 % (ref 11.5–14.5)
GFR SERPL CREATININE-BSD FRML MDRD: 118 ML/MIN/1.73
GLOBULIN UR ELPH-MCNC: 2.9 GM/DL
GLUCOSE BLD-MCNC: 97 MG/DL (ref 70–110)
HCT VFR BLD AUTO: 45.2 % (ref 37–47)
HGB BLD-MCNC: 14.5 G/DL (ref 12–16)
HOLD SPECIMEN: NORMAL
HOLD SPECIMEN: NORMAL
IMM GRANULOCYTES # BLD: 0.04 10*3/MM3 (ref 0–0.03)
IMM GRANULOCYTES NFR BLD: 0.3 % (ref 0–0.5)
LYMPHOCYTES # BLD AUTO: 2.54 10*3/MM3 (ref 1–3)
LYMPHOCYTES NFR BLD AUTO: 20.6 % (ref 21–51)
MAGNESIUM SERPL-MCNC: 2 MG/DL (ref 1.7–2.6)
MCH RBC QN AUTO: 28.3 PG (ref 27–33)
MCHC RBC AUTO-ENTMCNC: 32.1 G/DL (ref 33–37)
MCV RBC AUTO: 88.1 FL (ref 80–94)
MONOCYTES # BLD AUTO: 0.9 10*3/MM3 (ref 0.1–0.9)
MONOCYTES NFR BLD AUTO: 7.3 % (ref 0–10)
NEUTROPHILS # BLD AUTO: 8.87 10*3/MM3 (ref 1.4–6.5)
NEUTROPHILS NFR BLD AUTO: 71.8 % (ref 30–70)
OSMOLALITY SERPL CALC.SUM OF ELEC: 278.4 MOSM/KG (ref 273–305)
PLATELET # BLD AUTO: 181 10*3/MM3 (ref 130–400)
PMV BLD AUTO: 11.5 FL (ref 6–10)
POTASSIUM BLD-SCNC: 3.4 MMOL/L (ref 3.5–5.3)
PROT SERPL-MCNC: 7.6 G/DL (ref 6–8)
RBC # BLD AUTO: 5.13 10*6/MM3 (ref 4.2–5.4)
SODIUM BLD-SCNC: 140 MMOL/L (ref 135–153)
TROPONIN I SERPL-MCNC: <0.006 NG/ML
VALPROATE SERPL-MCNC: <1 MCG/ML (ref 50–100)
WBC NRBC COR # BLD: 12.35 10*3/MM3 (ref 4.5–12.5)
WHOLE BLOOD HOLD SPECIMEN: NORMAL
WHOLE BLOOD HOLD SPECIMEN: NORMAL

## 2018-01-29 PROCEDURE — 25010000002 DEXAMETHASONE PER 1 MG: Performed by: FAMILY MEDICINE

## 2018-01-29 PROCEDURE — 84484 ASSAY OF TROPONIN QUANT: CPT | Performed by: FAMILY MEDICINE

## 2018-01-29 PROCEDURE — 99284 EMERGENCY DEPT VISIT MOD MDM: CPT

## 2018-01-29 PROCEDURE — 71046 X-RAY EXAM CHEST 2 VIEWS: CPT

## 2018-01-29 PROCEDURE — 85025 COMPLETE CBC W/AUTO DIFF WBC: CPT | Performed by: FAMILY MEDICINE

## 2018-01-29 PROCEDURE — 96375 TX/PRO/DX INJ NEW DRUG ADDON: CPT

## 2018-01-29 PROCEDURE — 82553 CREATINE MB FRACTION: CPT | Performed by: FAMILY MEDICINE

## 2018-01-29 PROCEDURE — 36415 COLL VENOUS BLD VENIPUNCTURE: CPT

## 2018-01-29 PROCEDURE — 83735 ASSAY OF MAGNESIUM: CPT | Performed by: FAMILY MEDICINE

## 2018-01-29 PROCEDURE — 80053 COMPREHEN METABOLIC PANEL: CPT | Performed by: FAMILY MEDICINE

## 2018-01-29 PROCEDURE — 96367 TX/PROPH/DG ADDL SEQ IV INF: CPT

## 2018-01-29 PROCEDURE — 71046 X-RAY EXAM CHEST 2 VIEWS: CPT | Performed by: RADIOLOGY

## 2018-01-29 PROCEDURE — 25010000002 PROMETHAZINE PER 50 MG: Performed by: FAMILY MEDICINE

## 2018-01-29 PROCEDURE — 25010000002 MAGNESIUM SULFATE IN D5W 1G/100ML (PREMIX) 1-5 GM/100ML-% SOLUTION: Performed by: FAMILY MEDICINE

## 2018-01-29 PROCEDURE — 96365 THER/PROPH/DIAG IV INF INIT: CPT

## 2018-01-29 PROCEDURE — 96376 TX/PRO/DX INJ SAME DRUG ADON: CPT

## 2018-01-29 PROCEDURE — 25010000002 HYDROMORPHONE PER 4 MG: Performed by: FAMILY MEDICINE

## 2018-01-29 PROCEDURE — 80164 ASSAY DIPROPYLACETIC ACD TOT: CPT | Performed by: FAMILY MEDICINE

## 2018-01-29 PROCEDURE — 93005 ELECTROCARDIOGRAM TRACING: CPT | Performed by: EMERGENCY MEDICINE

## 2018-01-29 RX ORDER — HYDROMORPHONE HYDROCHLORIDE 1 MG/ML
0.5 INJECTION, SOLUTION INTRAMUSCULAR; INTRAVENOUS; SUBCUTANEOUS ONCE
Status: COMPLETED | OUTPATIENT
Start: 2018-01-29 | End: 2018-01-29

## 2018-01-29 RX ORDER — FAMOTIDINE 10 MG/ML
20 INJECTION, SOLUTION INTRAVENOUS ONCE
Status: COMPLETED | OUTPATIENT
Start: 2018-01-29 | End: 2018-01-29

## 2018-01-29 RX ORDER — POTASSIUM CHLORIDE 20 MEQ/1
40 TABLET, EXTENDED RELEASE ORAL ONCE
Status: COMPLETED | OUTPATIENT
Start: 2018-01-29 | End: 2018-01-29

## 2018-01-29 RX ORDER — ALUMINA, MAGNESIA, AND SIMETHICONE 2400; 2400; 240 MG/30ML; MG/30ML; MG/30ML
15 SUSPENSION ORAL ONCE
Status: COMPLETED | OUTPATIENT
Start: 2018-01-29 | End: 2018-01-29

## 2018-01-29 RX ORDER — PROMETHAZINE HYDROCHLORIDE 25 MG/ML
25 INJECTION, SOLUTION INTRAMUSCULAR; INTRAVENOUS ONCE
Status: COMPLETED | OUTPATIENT
Start: 2018-01-29 | End: 2018-01-29

## 2018-01-29 RX ORDER — SODIUM CHLORIDE 0.9 % (FLUSH) 0.9 %
10 SYRINGE (ML) INJECTION AS NEEDED
Status: DISCONTINUED | OUTPATIENT
Start: 2018-01-29 | End: 2018-01-29 | Stop reason: HOSPADM

## 2018-01-29 RX ORDER — ONDANSETRON 4 MG/1
8 TABLET, ORALLY DISINTEGRATING ORAL ONCE
Status: DISCONTINUED | OUTPATIENT
Start: 2018-01-29 | End: 2018-01-29

## 2018-01-29 RX ORDER — ASPIRIN 81 MG/1
324 TABLET, CHEWABLE ORAL ONCE
Status: COMPLETED | OUTPATIENT
Start: 2018-01-29 | End: 2018-01-29

## 2018-01-29 RX ORDER — SODIUM CHLORIDE 9 MG/ML
INJECTION, SOLUTION INTRAVENOUS
Status: DISCONTINUED
Start: 2018-01-29 | End: 2018-01-29 | Stop reason: WASHOUT

## 2018-01-29 RX ORDER — MAGNESIUM SULFATE 1 G/100ML
1 INJECTION INTRAVENOUS ONCE
Status: COMPLETED | OUTPATIENT
Start: 2018-01-29 | End: 2018-01-29

## 2018-01-29 RX ADMIN — SODIUM CHLORIDE 1000 ML: 9 INJECTION, SOLUTION INTRAVENOUS at 15:38

## 2018-01-29 RX ADMIN — ALUMINUM HYDROXIDE, MAGNESIUM HYDROXIDE, AND DIMETHICONE 15 ML: 400; 400; 40 SUSPENSION ORAL at 18:32

## 2018-01-29 RX ADMIN — HYDROMORPHONE HYDROCHLORIDE 0.5 MG: 1 INJECTION, SOLUTION INTRAMUSCULAR; INTRAVENOUS; SUBCUTANEOUS at 18:26

## 2018-01-29 RX ADMIN — LIDOCAINE HYDROCHLORIDE 15 ML: 20 SOLUTION ORAL; TOPICAL at 18:32

## 2018-01-29 RX ADMIN — PROMETHAZINE HYDROCHLORIDE 25 MG: 25 INJECTION, SOLUTION INTRAMUSCULAR; INTRAVENOUS at 19:35

## 2018-01-29 RX ADMIN — DEXAMETHASONE SODIUM PHOSPHATE 10 MG: 4 INJECTION, SOLUTION INTRAMUSCULAR; INTRAVENOUS at 16:08

## 2018-01-29 RX ADMIN — POTASSIUM CHLORIDE 40 MEQ: 1500 TABLET, EXTENDED RELEASE ORAL at 16:17

## 2018-01-29 RX ADMIN — VALPROATE SODIUM 500 MG: 100 INJECTION, SOLUTION INTRAVENOUS at 18:34

## 2018-01-29 RX ADMIN — FAMOTIDINE 20 MG: 10 INJECTION, SOLUTION INTRAVENOUS at 18:19

## 2018-01-29 RX ADMIN — MAGNESIUM SULFATE IN DEXTROSE 1 G: 10 INJECTION, SOLUTION INTRAVENOUS at 17:05

## 2018-01-29 RX ADMIN — PROMETHAZINE HYDROCHLORIDE 25 MG: 25 INJECTION INTRAMUSCULAR; INTRAVENOUS at 16:01

## 2018-01-29 RX ADMIN — ASPIRIN 324 MG: 81 TABLET, CHEWABLE ORAL at 15:21

## 2018-01-29 RX ADMIN — HYDROMORPHONE HYDROCHLORIDE 0.5 MG: 1 INJECTION, SOLUTION INTRAMUSCULAR; INTRAVENOUS; SUBCUTANEOUS at 15:43

## 2018-01-30 ENCOUNTER — TRANSCRIBE ORDERS (OUTPATIENT)
Dept: ADMINISTRATIVE | Facility: HOSPITAL | Age: 43
End: 2018-01-30

## 2018-01-30 DIAGNOSIS — R07.9 CHEST PAIN, UNSPECIFIED TYPE: Primary | ICD-10-CM

## 2018-02-05 ENCOUNTER — HOSPITAL ENCOUNTER (EMERGENCY)
Facility: HOSPITAL | Age: 43
Discharge: HOME | End: 2018-02-05
Attending: EMERGENCY MEDICINE | Admitting: EMERGENCY MEDICINE

## 2018-02-05 VITALS
HEIGHT: 69 IN | BODY MASS INDEX: 24.14 KG/M2 | RESPIRATION RATE: 18 BRPM | TEMPERATURE: 97.9 F | SYSTOLIC BLOOD PRESSURE: 129 MMHG | WEIGHT: 163 LBS | DIASTOLIC BLOOD PRESSURE: 89 MMHG | OXYGEN SATURATION: 98 % | HEART RATE: 68 BPM

## 2018-02-05 DIAGNOSIS — R10.84 GENERALIZED ABDOMINAL PAIN: Primary | ICD-10-CM

## 2018-02-05 LAB
6-ACETYL MORPHINE: NEGATIVE
ALBUMIN SERPL-MCNC: 4.6 G/DL (ref 3.5–5)
ALBUMIN/GLOB SERPL: 1.6 G/DL (ref 1.5–2.5)
ALP SERPL-CCNC: 79 U/L (ref 35–104)
ALT SERPL W P-5'-P-CCNC: 65 U/L (ref 10–36)
AMPHET+METHAMPHET UR QL: NEGATIVE
ANION GAP SERPL CALCULATED.3IONS-SCNC: 6.8 MMOL/L (ref 3.6–11.2)
AST SERPL-CCNC: 46 U/L (ref 10–30)
BACTERIA UR QL AUTO: NORMAL /HPF
BARBITURATES UR QL SCN: NEGATIVE
BASOPHILS # BLD AUTO: 0.01 10*3/MM3 (ref 0–0.3)
BASOPHILS NFR BLD AUTO: 0.2 % (ref 0–2)
BENZODIAZ UR QL SCN: NEGATIVE
BILIRUB SERPL-MCNC: 0.6 MG/DL (ref 0.2–1.8)
BILIRUB UR QL STRIP: NEGATIVE
BUN BLD-MCNC: 6 MG/DL (ref 7–21)
BUN/CREAT SERPL: 9.5 (ref 7–25)
BUPRENORPHINE SERPL-MCNC: NEGATIVE NG/ML
CALCIUM SPEC-SCNC: 9.5 MG/DL (ref 7.7–10)
CANNABINOIDS SERPL QL: POSITIVE
CHLORIDE SERPL-SCNC: 106 MMOL/L (ref 99–112)
CLARITY UR: CLEAR
CO2 SERPL-SCNC: 27.2 MMOL/L (ref 24.3–31.9)
COCAINE UR QL: NEGATIVE
COLOR UR: YELLOW
CREAT BLD-MCNC: 0.63 MG/DL (ref 0.43–1.29)
DEPRECATED RDW RBC AUTO: 47.7 FL (ref 37–54)
DEVELOPER EXPIRATION DATE: NORMAL
DEVELOPER LOT NUMBER: NORMAL
EOSINOPHIL # BLD AUTO: 0.08 10*3/MM3 (ref 0–0.7)
EOSINOPHIL NFR BLD AUTO: 1.4 % (ref 0–5)
ERYTHROCYTE [DISTWIDTH] IN BLOOD BY AUTOMATED COUNT: 15 % (ref 11.5–14.5)
EXPIRATION DATE: NORMAL
FECAL OCCULT BLOOD SCREEN, POC: NEGATIVE
GFR SERPL CREATININE-BSD FRML MDRD: 103 ML/MIN/1.73
GLOBULIN UR ELPH-MCNC: 2.8 GM/DL
GLUCOSE BLD-MCNC: 89 MG/DL (ref 70–110)
GLUCOSE UR STRIP-MCNC: NEGATIVE MG/DL
HCT VFR BLD AUTO: 43.9 % (ref 37–47)
HGB BLD-MCNC: 14.6 G/DL (ref 12–16)
HGB UR QL STRIP.AUTO: NEGATIVE
HYALINE CASTS UR QL AUTO: NORMAL /LPF
IMM GRANULOCYTES # BLD: 0.01 10*3/MM3 (ref 0–0.03)
IMM GRANULOCYTES NFR BLD: 0.2 % (ref 0–0.5)
KETONES UR QL STRIP: NEGATIVE
LEUKOCYTE ESTERASE UR QL STRIP.AUTO: ABNORMAL
LIPASE SERPL-CCNC: 86 U/L (ref 13–60)
LYMPHOCYTES # BLD AUTO: 2.7 10*3/MM3 (ref 1–3)
LYMPHOCYTES NFR BLD AUTO: 46 % (ref 21–51)
Lab: NORMAL
MCH RBC QN AUTO: 29 PG (ref 27–33)
MCHC RBC AUTO-ENTMCNC: 33.3 G/DL (ref 33–37)
MCV RBC AUTO: 87.1 FL (ref 80–94)
METHADONE UR QL SCN: NEGATIVE
MONOCYTES # BLD AUTO: 0.52 10*3/MM3 (ref 0.1–0.9)
MONOCYTES NFR BLD AUTO: 8.9 % (ref 0–10)
NEGATIVE CONTROL: NEGATIVE
NEUTROPHILS # BLD AUTO: 2.55 10*3/MM3 (ref 1.4–6.5)
NEUTROPHILS NFR BLD AUTO: 43.3 % (ref 30–70)
NITRITE UR QL STRIP: NEGATIVE
OPIATES UR QL: NEGATIVE
OSMOLALITY SERPL CALC.SUM OF ELEC: 276.5 MOSM/KG (ref 273–305)
OXYCODONE UR QL SCN: NEGATIVE
PCP UR QL SCN: NEGATIVE
PH UR STRIP.AUTO: 6.5 [PH] (ref 5–8)
PLATELET # BLD AUTO: 143 10*3/MM3 (ref 130–400)
PMV BLD AUTO: 11.1 FL (ref 6–10)
POSITIVE CONTROL: POSITIVE
POTASSIUM BLD-SCNC: 3.7 MMOL/L (ref 3.5–5.3)
PROT SERPL-MCNC: 7.4 G/DL (ref 6–8)
PROT UR QL STRIP: NEGATIVE
RBC # BLD AUTO: 5.04 10*6/MM3 (ref 4.2–5.4)
RBC # UR: NORMAL /HPF
REF LAB TEST METHOD: NORMAL
SODIUM BLD-SCNC: 140 MMOL/L (ref 135–153)
SP GR UR STRIP: 1.01 (ref 1–1.03)
SQUAMOUS #/AREA URNS HPF: NORMAL /HPF
UROBILINOGEN UR QL STRIP: ABNORMAL
WBC NRBC COR # BLD: 5.87 10*3/MM3 (ref 4.5–12.5)
WBC UR QL AUTO: NORMAL /HPF

## 2018-02-05 PROCEDURE — 80307 DRUG TEST PRSMV CHEM ANLYZR: CPT | Performed by: PHYSICIAN ASSISTANT

## 2018-02-05 PROCEDURE — 85025 COMPLETE CBC W/AUTO DIFF WBC: CPT | Performed by: PHYSICIAN ASSISTANT

## 2018-02-05 PROCEDURE — 82270 OCCULT BLOOD FECES: CPT | Performed by: PHYSICIAN ASSISTANT

## 2018-02-05 PROCEDURE — 80053 COMPREHEN METABOLIC PANEL: CPT | Performed by: PHYSICIAN ASSISTANT

## 2018-02-05 PROCEDURE — 99283 EMERGENCY DEPT VISIT LOW MDM: CPT

## 2018-02-05 PROCEDURE — 36415 COLL VENOUS BLD VENIPUNCTURE: CPT

## 2018-02-05 PROCEDURE — 83690 ASSAY OF LIPASE: CPT | Performed by: PHYSICIAN ASSISTANT

## 2018-02-05 PROCEDURE — 81001 URINALYSIS AUTO W/SCOPE: CPT | Performed by: PHYSICIAN ASSISTANT

## 2018-02-05 PROCEDURE — 63710000001 PROMETHAZINE PER 25 MG: Performed by: EMERGENCY MEDICINE

## 2018-02-05 RX ORDER — DICYCLOMINE HCL 20 MG
20 TABLET ORAL EVERY 6 HOURS PRN
Qty: 10 TABLET | Refills: 0 | Status: SHIPPED | OUTPATIENT
Start: 2018-02-05 | End: 2018-03-22 | Stop reason: HOSPADM

## 2018-02-05 RX ORDER — PROMETHAZINE HYDROCHLORIDE 25 MG/1
25 TABLET ORAL ONCE
Status: COMPLETED | OUTPATIENT
Start: 2018-02-05 | End: 2018-02-05

## 2018-02-05 RX ORDER — HYDROCODONE BITARTRATE AND ACETAMINOPHEN 7.5; 325 MG/1; MG/1
1 TABLET ORAL ONCE
Status: COMPLETED | OUTPATIENT
Start: 2018-02-05 | End: 2018-02-05

## 2018-02-05 RX ADMIN — PROMETHAZINE HYDROCHLORIDE 25 MG: 25 TABLET ORAL at 20:54

## 2018-02-05 RX ADMIN — HYDROCODONE BITARTRATE AND ACETAMINOPHEN 1 TABLET: 7.5; 325 TABLET ORAL at 20:54

## 2018-02-06 NOTE — ED NOTES
"Pt states, \"If you want to, please ask Jaspreet for something for pain.\" Provider aware, no new orders received      Madyson Meraz RN  02/05/18 2022    "

## 2018-02-06 NOTE — ED PROVIDER NOTES
Subjective   Patient is a 43 y.o. female presenting with abdominal pain.   History provided by:  Patient   used: No    Abdominal Pain   Pain location:  Generalized  Pain quality: cramping    Pain radiates to:  Does not radiate  Pain severity:  Moderate  Onset quality:  Sudden  Duration:  3 days  Timing:  Constant  Progression:  Unchanged  Chronicity:  Recurrent  Context: not eating, not recent illness and not suspicious food intake    Relieved by:  Nothing  Worsened by:  Palpation and bowel movements  Ineffective treatments:  None tried  Associated symptoms: hematochezia, nausea and vomiting    Associated symptoms: no chest pain, no constipation, no cough, no diarrhea, no dysuria, no fever, no shortness of breath and no sore throat    Risk factors: no NSAID use and not obese        Review of Systems   Constitutional: Negative for activity change and fever.   HENT: Negative for congestion, ear pain and sore throat.    Eyes: Negative for pain.   Respiratory: Negative for cough, shortness of breath and wheezing.    Cardiovascular: Negative for chest pain.   Gastrointestinal: Positive for abdominal pain, hematochezia, nausea and vomiting. Negative for abdominal distention, constipation and diarrhea.   Genitourinary: Negative for difficulty urinating and dysuria.   Musculoskeletal: Negative for arthralgias and myalgias.   Skin: Negative for rash and wound.   Neurological: Negative for dizziness and headaches.   Psychiatric/Behavioral: Negative for agitation.   All other systems reviewed and are negative.      Past Medical History:   Diagnosis Date   • Abdominal pain    • Abdominal swelling    • Hoyos's disease    • Bipolar 1 disorder    • Brain tumor     R Frontal Lobe per pt   • Brain tumor 2014   • Brain tumor (benign)    • Constipation    • DDD (degenerative disc disease), cervical 05/29/2017   • DDD (degenerative disc disease), cervical    • Diarrhea    • Fibromyalgia    • IBS (irritable bowel  syndrome)    • Migraine    • Nausea & vomiting    • PONV (postoperative nausea and vomiting)    • PTSD (post-traumatic stress disorder)    • Rectal bleeding        Allergies   Allergen Reactions   • Ativan [Lorazepam] Hallucinations     confusion   • Sulfa Antibiotics Shortness Of Breath and Swelling   • Compazine [Prochlorperazine Edisylate] Hives   • Demerol [Meperidine] Hives   • Droperidol Itching   • Metoclopramide Swelling   • Toradol [Ketorolac Tromethamine] Hives and Itching   • Zofran [Ondansetron Hcl] Rash       Past Surgical History:   Procedure Laterality Date   • ANAL SCOPE N/A 7/28/2016    Procedure: ANAL SCOPE;  Surgeon: Kael Lopez MD;  Location: [facility] OR;  Service:    • APPENDECTOMY     • COLONOSCOPY N/A 6/30/2016    Procedure: COLONOSCOPY  CPTCODE:65458;  Surgeon: Jose Antonio Belle III, MD;  Location: [facility] OR;  Service:    • COLONOSCOPY N/A 7/7/2016    Procedure: COLONOSCOPY (51967) CPT;  Surgeon: Jose Antonio Belle III, MD;  Location: [facility] OR;  Service:    • CYSTOSCOPY RETROGRADE PYELOGRAM Bilateral 4/28/2017    Procedure: CYSTOSCOPY RETROGRADE PYELOGRAM;  Surgeon: Mu Blunt MD;  Location: [facility] OR;  Service:    • ENDOSCOPY N/A 6/30/2016    Procedure: ESOPHAGOGASTRODUODENOSCOPY WITH BIOPSY  CPTCODE:74212;  Surgeon: Jose Antonio Belle III, MD;  Location: [facility] OR;  Service:    • HEMORRHOIDECTOMY N/A 7/28/2016    Procedure: HEMORRHOID STAPLING;  Surgeon: Kael Lopez MD;  Location: [facility] OR;  Service:    • HYSTERECTOMY     • KNEE SURGERY     • LAPAROSCOPIC SALPINGOOPHERECTOMY     • PORTACATH PLACEMENT N/A 7/28/2017    Procedure: INSERTION OF PORTACATH;  Surgeon: Celso Arredondo MD;  Location: [facility] OR;  Service:    • SHOULDER SURGERY      3 times       Family History   Problem Relation Age of Onset   • Crohn's disease Other    • Hypertension Other    • Diabetes Other    • Irritable bowel syndrome Other        Social History     Social History   • Marital status:       Spouse name: N/A   • Number of children: N/A   • Years of education: N/A     Social History Main Topics   • Smoking status: Former Smoker     Packs/day: 1.00     Years: 20.00     Quit date: 11/1/2015   • Smokeless tobacco: Never Used   • Alcohol use No   • Drug use: Yes     Special: Marijuana      Comment: for pain   • Sexual activity: Defer     Other Topics Concern   • None     Social History Narrative   • None           Objective   Physical Exam   Constitutional: She is oriented to person, place, and time. She appears well-developed and well-nourished.   HENT:   Head: Normocephalic and atraumatic.   Eyes: EOM are normal. Pupils are equal, round, and reactive to light.   Neck: Normal range of motion. Neck supple.   Cardiovascular: Normal rate, regular rhythm and normal heart sounds.    Pulmonary/Chest: Effort normal and breath sounds normal.   Abdominal: Soft. Bowel sounds are normal. There is generalized tenderness.   Genitourinary: Rectum normal. Rectal exam shows guaiac negative stool.   Musculoskeletal: Normal range of motion.   Neurological: She is alert and oriented to person, place, and time.   Skin: Skin is warm and dry.   Psychiatric: She has a normal mood and affect. Her behavior is normal. Judgment and thought content normal.   Nursing note and vitals reviewed.      Procedures         ED Course  ED Course                  MDM  Number of Diagnoses or Management Options  Generalized abdominal pain:      Amount and/or Complexity of Data Reviewed  Clinical lab tests: ordered and reviewed  Tests in the radiology section of CPT®: ordered and reviewed  Tests in the medicine section of CPT®: reviewed and ordered  Decide to obtain previous medical records or to obtain history from someone other than the patient: yes    Patient Progress  Patient progress: stable      Final diagnoses:   Generalized abdominal pain            TAMI Vicente  02/05/18 1031

## 2018-02-06 NOTE — ED NOTES
Pt observed drinking Mt Dew despite complaining of nausea and abdominal pain      Madyson Meraz, [place]  02/05/18 [id number]

## 2018-02-13 ENCOUNTER — APPOINTMENT (OUTPATIENT)
Dept: CT IMAGING | Facility: HOSPITAL | Age: 43
End: 2018-02-13

## 2018-02-13 ENCOUNTER — HOSPITAL ENCOUNTER (EMERGENCY)
Facility: HOSPITAL | Age: 43
Discharge: HOME | End: 2018-02-13
Attending: EMERGENCY MEDICINE | Admitting: EMERGENCY MEDICINE

## 2018-02-13 VITALS
HEART RATE: 92 BPM | DIASTOLIC BLOOD PRESSURE: 66 MMHG | RESPIRATION RATE: 16 BRPM | HEIGHT: 67 IN | WEIGHT: 163 LBS | BODY MASS INDEX: 25.58 KG/M2 | OXYGEN SATURATION: 99 % | TEMPERATURE: 97.3 F | SYSTOLIC BLOOD PRESSURE: 115 MMHG

## 2018-02-13 DIAGNOSIS — N39.0 UTI (URINARY TRACT INFECTION), UNCOMPLICATED: ICD-10-CM

## 2018-02-13 DIAGNOSIS — K52.9 COLITIS: Primary | ICD-10-CM

## 2018-02-13 DIAGNOSIS — R10.9 RIGHT FLANK PAIN: ICD-10-CM

## 2018-02-13 LAB
ALBUMIN SERPL-MCNC: 4.2 G/DL (ref 3.5–5)
ALBUMIN/GLOB SERPL: 1.8 G/DL (ref 1.5–2.5)
ALP SERPL-CCNC: 67 U/L (ref 35–104)
ALT SERPL W P-5'-P-CCNC: 78 U/L (ref 10–36)
AMYLASE SERPL-CCNC: 74 U/L (ref 28–100)
ANION GAP SERPL CALCULATED.3IONS-SCNC: 6.8 MMOL/L (ref 3.6–11.2)
AST SERPL-CCNC: 54 U/L (ref 10–30)
BACTERIA UR QL AUTO: ABNORMAL /HPF
BASOPHILS # BLD AUTO: 0.01 10*3/MM3 (ref 0–0.3)
BASOPHILS NFR BLD AUTO: 0.3 % (ref 0–2)
BILIRUB SERPL-MCNC: 0.9 MG/DL (ref 0.2–1.8)
BILIRUB UR QL STRIP: ABNORMAL
BUN BLD-MCNC: 6 MG/DL (ref 7–21)
BUN/CREAT SERPL: 9.5 (ref 7–25)
CALCIUM SPEC-SCNC: 8.8 MG/DL (ref 7.7–10)
CHLORIDE SERPL-SCNC: 108 MMOL/L (ref 99–112)
CLARITY UR: ABNORMAL
CO2 SERPL-SCNC: 26.2 MMOL/L (ref 24.3–31.9)
COLOR UR: ABNORMAL
CREAT BLD-MCNC: 0.63 MG/DL (ref 0.43–1.29)
DEPRECATED RDW RBC AUTO: 47 FL (ref 37–54)
EOSINOPHIL # BLD AUTO: 0.1 10*3/MM3 (ref 0–0.7)
EOSINOPHIL NFR BLD AUTO: 2.6 % (ref 0–5)
ERYTHROCYTE [DISTWIDTH] IN BLOOD BY AUTOMATED COUNT: 14.9 % (ref 11.5–14.5)
GFR SERPL CREATININE-BSD FRML MDRD: 103 ML/MIN/1.73
GLOBULIN UR ELPH-MCNC: 2.3 GM/DL
GLUCOSE BLD-MCNC: 105 MG/DL (ref 70–110)
GLUCOSE UR STRIP-MCNC: NEGATIVE MG/DL
HCT VFR BLD AUTO: 38.4 % (ref 37–47)
HGB BLD-MCNC: 12.8 G/DL (ref 12–16)
HGB UR QL STRIP.AUTO: ABNORMAL
HYALINE CASTS UR QL AUTO: ABNORMAL /LPF
IMM GRANULOCYTES # BLD: 0.01 10*3/MM3 (ref 0–0.03)
IMM GRANULOCYTES NFR BLD: 0.3 % (ref 0–0.5)
KETONES UR QL STRIP: NEGATIVE
LEUKOCYTE ESTERASE UR QL STRIP.AUTO: ABNORMAL
LIPASE SERPL-CCNC: 68 U/L (ref 13–60)
LYMPHOCYTES # BLD AUTO: 1.62 10*3/MM3 (ref 1–3)
LYMPHOCYTES NFR BLD AUTO: 41.3 % (ref 21–51)
MAGNESIUM SERPL-MCNC: 1.8 MG/DL (ref 1.7–2.6)
MCH RBC QN AUTO: 29.1 PG (ref 27–33)
MCHC RBC AUTO-ENTMCNC: 33.3 G/DL (ref 33–37)
MCV RBC AUTO: 87.3 FL (ref 80–94)
MONOCYTES # BLD AUTO: 0.36 10*3/MM3 (ref 0.1–0.9)
MONOCYTES NFR BLD AUTO: 9.2 % (ref 0–10)
NEUTROPHILS # BLD AUTO: 1.82 10*3/MM3 (ref 1.4–6.5)
NEUTROPHILS NFR BLD AUTO: 46.3 % (ref 30–70)
NITRITE UR QL STRIP: NEGATIVE
OSMOLALITY SERPL CALC.SUM OF ELEC: 279.2 MOSM/KG (ref 273–305)
PH UR STRIP.AUTO: <=5 [PH] (ref 5–8)
PLATELET # BLD AUTO: 120 10*3/MM3 (ref 130–400)
PMV BLD AUTO: 11 FL (ref 6–10)
POTASSIUM BLD-SCNC: 3.6 MMOL/L (ref 3.5–5.3)
PROT SERPL-MCNC: 6.5 G/DL (ref 6–8)
PROT UR QL STRIP: ABNORMAL
RBC # BLD AUTO: 4.4 10*6/MM3 (ref 4.2–5.4)
RBC # UR: ABNORMAL /HPF
REF LAB TEST METHOD: ABNORMAL
SODIUM BLD-SCNC: 141 MMOL/L (ref 135–153)
SP GR UR STRIP: 1.02 (ref 1–1.03)
SQUAMOUS #/AREA URNS HPF: ABNORMAL /HPF
UROBILINOGEN UR QL STRIP: ABNORMAL
VALPROATE SERPL-MCNC: 3.3 MCG/ML (ref 50–100)
WBC NRBC COR # BLD: 3.92 10*3/MM3 (ref 4.5–12.5)
WBC UR QL AUTO: ABNORMAL /HPF

## 2018-02-13 PROCEDURE — 74176 CT ABD & PELVIS W/O CONTRAST: CPT | Performed by: RADIOLOGY

## 2018-02-13 PROCEDURE — 81015 MICROSCOPIC EXAM OF URINE: CPT | Performed by: PHYSICIAN ASSISTANT

## 2018-02-13 PROCEDURE — 25010000002 CEFTRIAXONE: Performed by: PHYSICIAN ASSISTANT

## 2018-02-13 PROCEDURE — 74176 CT ABD & PELVIS W/O CONTRAST: CPT

## 2018-02-13 PROCEDURE — 87040 BLOOD CULTURE FOR BACTERIA: CPT | Performed by: PHYSICIAN ASSISTANT

## 2018-02-13 PROCEDURE — 99283 EMERGENCY DEPT VISIT LOW MDM: CPT

## 2018-02-13 PROCEDURE — 25010000002 HEPARIN FLUSH (PORCINE) 100 UNIT/ML SOLUTION: Performed by: EMERGENCY MEDICINE

## 2018-02-13 PROCEDURE — 36415 COLL VENOUS BLD VENIPUNCTURE: CPT

## 2018-02-13 PROCEDURE — 83690 ASSAY OF LIPASE: CPT | Performed by: PHYSICIAN ASSISTANT

## 2018-02-13 PROCEDURE — 96365 THER/PROPH/DIAG IV INF INIT: CPT

## 2018-02-13 PROCEDURE — 96375 TX/PRO/DX INJ NEW DRUG ADDON: CPT

## 2018-02-13 PROCEDURE — 81003 URINALYSIS AUTO W/O SCOPE: CPT | Performed by: PHYSICIAN ASSISTANT

## 2018-02-13 PROCEDURE — 82150 ASSAY OF AMYLASE: CPT | Performed by: PHYSICIAN ASSISTANT

## 2018-02-13 PROCEDURE — 25010000002 HYDROMORPHONE PER 4 MG

## 2018-02-13 PROCEDURE — 80053 COMPREHEN METABOLIC PANEL: CPT | Performed by: PHYSICIAN ASSISTANT

## 2018-02-13 PROCEDURE — 85025 COMPLETE CBC W/AUTO DIFF WBC: CPT | Performed by: PHYSICIAN ASSISTANT

## 2018-02-13 PROCEDURE — 83735 ASSAY OF MAGNESIUM: CPT | Performed by: PHYSICIAN ASSISTANT

## 2018-02-13 PROCEDURE — 25010000002 PROMETHAZINE PER 50 MG: Performed by: PHYSICIAN ASSISTANT

## 2018-02-13 PROCEDURE — 80164 ASSAY DIPROPYLACETIC ACD TOT: CPT | Performed by: PHYSICIAN ASSISTANT

## 2018-02-13 PROCEDURE — 87086 URINE CULTURE/COLONY COUNT: CPT | Performed by: PHYSICIAN ASSISTANT

## 2018-02-13 RX ORDER — SODIUM CHLORIDE 0.9 % (FLUSH) 0.9 %
10 SYRINGE (ML) INJECTION AS NEEDED
Status: DISCONTINUED | OUTPATIENT
Start: 2018-02-13 | End: 2018-02-13 | Stop reason: HOSPADM

## 2018-02-13 RX ORDER — PROMETHAZINE HYDROCHLORIDE 25 MG/ML
12.5 INJECTION, SOLUTION INTRAMUSCULAR; INTRAVENOUS ONCE
Status: COMPLETED | OUTPATIENT
Start: 2018-02-13 | End: 2018-02-13

## 2018-02-13 RX ORDER — AMOXICILLIN AND CLAVULANATE POTASSIUM 875; 125 MG/1; MG/1
1 TABLET, FILM COATED ORAL 2 TIMES DAILY
Qty: 20 TABLET | Refills: 0 | Status: SHIPPED | OUTPATIENT
Start: 2018-02-13 | End: 2018-02-23

## 2018-02-13 RX ORDER — METRONIDAZOLE 500 MG/1
500 TABLET ORAL 4 TIMES DAILY
Qty: 40 TABLET | Refills: 0 | Status: SHIPPED | OUTPATIENT
Start: 2018-02-13 | End: 2018-02-23

## 2018-02-13 RX ORDER — HYDROMORPHONE HCL 110MG/55ML
PATIENT CONTROLLED ANALGESIA SYRINGE INTRAVENOUS
Status: COMPLETED
Start: 2018-02-13 | End: 2018-02-13

## 2018-02-13 RX ADMIN — HYDROMORPHONE HYDROCHLORIDE 1 MG: 2 INJECTION INTRAMUSCULAR; INTRAVENOUS; SUBCUTANEOUS at 09:49

## 2018-02-13 RX ADMIN — SODIUM CHLORIDE, PRESERVATIVE FREE 300 UNITS: 5 INJECTION INTRAVENOUS at 11:22

## 2018-02-13 RX ADMIN — PROMETHAZINE HYDROCHLORIDE 12.5 MG: 25 INJECTION INTRAMUSCULAR; INTRAVENOUS at 09:48

## 2018-02-13 RX ADMIN — CEFTRIAXONE 1 G: 1 INJECTION, POWDER, FOR SOLUTION INTRAMUSCULAR; INTRAVENOUS at 10:51

## 2018-02-13 RX ADMIN — SODIUM CHLORIDE 1000 ML: 9 INJECTION, SOLUTION INTRAVENOUS at 09:48

## 2018-02-13 NOTE — ED PROVIDER NOTES
Subjective   HPI Comments: 43-year-old female presents to the ED today complaining of right flank pain that started yesterday.  She states the pain radiates around to her right abdomen and down into her groin.  She states she has a history of kidney stones in the past.  She states last night she started to have hematuria.  She states she can only urinate a small amount at a time.  She states she does intermittently self catheter due to a history of urethral strictures.  She states she currently is able to urinate on her own.  She denies any fever.  She denies any diarrhea or constipation.  She states she has tried her Phenergan cream at home as well as Stadol nasal spray and ibuprofen with no relief.    Patient is a 43 y.o. female presenting with flank pain.   History provided by:  Patient  Flank Pain   Pain location:  R flank  Pain quality: sharp    Pain radiates to:  Groin  Pain severity:  Severe  Onset quality:  Gradual  Duration:  1 day  Timing:  Constant  Progression:  Worsening  Chronicity:  Recurrent  Context: not recent travel, not sick contacts, not suspicious food intake and not trauma    Relieved by:  Nothing  Worsened by:  Nothing  Ineffective treatments: phenergan cream, stadol nasal spray and ibuprofen.  Associated symptoms: hematuria, nausea and vomiting    Associated symptoms: no anorexia, no chest pain, no chills, no constipation, no cough, no diarrhea, no dysuria, no fatigue, no fever, no hematemesis, no hematochezia, no melena, no shortness of breath and no sore throat    Risk factors: not pregnant        Review of Systems   Constitutional: Negative for chills, fatigue and fever.   HENT: Negative.  Negative for sore throat.    Eyes: Negative.    Respiratory: Negative for cough and shortness of breath.    Cardiovascular: Negative for chest pain.   Gastrointestinal: Positive for nausea and vomiting. Negative for anorexia, constipation, diarrhea, hematemesis, hematochezia and melena.   Genitourinary:  Positive for difficulty urinating, flank pain and hematuria. Negative for dysuria and urgency.   Musculoskeletal: Positive for back pain.   Skin: Negative.    Neurological: Negative.    Psychiatric/Behavioral: Negative.    All other systems reviewed and are negative.      Past Medical History:   Diagnosis Date   • Abdominal pain    • Abdominal swelling    • Hoyos's disease    • Bipolar 1 disorder    • Brain tumor     R Frontal Lobe per pt   • Brain tumor 2014   • Brain tumor (benign)    • Constipation    • DDD (degenerative disc disease), cervical 05/29/2017   • DDD (degenerative disc disease), cervical    • Diarrhea    • Fibromyalgia    • IBS (irritable bowel syndrome)    • Migraine    • Nausea & vomiting    • PONV (postoperative nausea and vomiting)    • PTSD (post-traumatic stress disorder)    • Rectal bleeding        Allergies   Allergen Reactions   • Ativan [Lorazepam] Hallucinations     confusion   • Sulfa Antibiotics Shortness Of Breath and Swelling   • Compazine [Prochlorperazine Edisylate] Hives   • Demerol [Meperidine] Hives   • Droperidol Itching   • Metoclopramide Swelling   • Toradol [Ketorolac Tromethamine] Hives and Itching   • Zofran [Ondansetron Hcl] Rash       Past Surgical History:   Procedure Laterality Date   • ANAL SCOPE N/A 7/28/2016    Procedure: ANAL SCOPE;  Surgeon: Kael Lopez MD;  Location: [facility] OR;  Service:    • APPENDECTOMY     • COLONOSCOPY N/A 6/30/2016    Procedure: COLONOSCOPY  CPTCODE:00647;  Surgeon: Jose Antonio Belle III, MD;  Location: [facility] OR;  Service:    • COLONOSCOPY N/A 7/7/2016    Procedure: COLONOSCOPY (10351) CPT;  Surgeon: Jose Antonio Belle III, MD;  Location: [facility] OR;  Service:    • CYSTOSCOPY RETROGRADE PYELOGRAM Bilateral 4/28/2017    Procedure: CYSTOSCOPY RETROGRADE PYELOGRAM;  Surgeon: Mu Blunt MD;  Location: [facility] OR;  Service:    • ENDOSCOPY N/A 6/30/2016    Procedure: ESOPHAGOGASTRODUODENOSCOPY WITH BIOPSY  CPTCODE:24920;  Surgeon:  Jose Antonio Belle III, MD;  Location: [facility] OR;  Service:    • HEMORRHOIDECTOMY N/A 7/28/2016    Procedure: HEMORRHOID STAPLING;  Surgeon: Kael Lopez MD;  Location: [facility] OR;  Service:    • HYSTERECTOMY     • KNEE SURGERY     • LAPAROSCOPIC SALPINGOOPHERECTOMY     • PORTACATH PLACEMENT N/A 7/28/2017    Procedure: INSERTION OF PORTACATH;  Surgeon: Celso Arredondo MD;  Location: [facility] OR;  Service:    • SHOULDER SURGERY      3 times       Family History   Problem Relation Age of Onset   • Crohn's disease Other    • Hypertension Other    • Diabetes Other    • Irritable bowel syndrome Other        Social History     Social History   • Marital status:      Spouse name: N/A   • Number of children: N/A   • Years of education: N/A     Social History Main Topics   • Smoking status: Former Smoker     Packs/day: 1.00     Years: 20.00     Quit date: 11/1/2015   • Smokeless tobacco: Never Used   • Alcohol use No   • Drug use: Yes     Special: Marijuana      Comment: for pain   • Sexual activity: Defer     Other Topics Concern   • None     Social History Narrative           Objective   Physical Exam   Constitutional: She is oriented to person, place, and time. She appears well-developed and well-nourished. No distress.   HENT:   Head: Normocephalic and atraumatic.   Right Ear: External ear normal.   Left Ear: External ear normal.   Nose: Nose normal.   Mouth/Throat: Oropharynx is clear and moist.   Eyes: Conjunctivae and EOM are normal. Pupils are equal, round, and reactive to light.   Neck: Normal range of motion. Neck supple.   Cardiovascular: Normal rate, regular rhythm, normal heart sounds and intact distal pulses.    Pulmonary/Chest: Effort normal and breath sounds normal. No respiratory distress. She has no wheezes.   Abdominal: Soft. Bowel sounds are normal. She exhibits no distension. There is tenderness (mildly tender throughout right abdomen, no rebound or guarding, no CVA tenderness). There is no  rebound and no guarding.   Musculoskeletal: Normal range of motion.   Neurological: She is alert and oriented to person, place, and time.   Skin: Skin is warm and dry.   Psychiatric: She has a normal mood and affect. Her behavior is normal. Judgment and thought content normal.   Nursing note and vitals reviewed.      Procedures         ED Course  ED Course   Comment By Time   Patient found to have diffuse colitis on CT scan, no ureteral stone.  She denies any bowel symptoms.  She states she has a history of IBS.  She also has a UTI, urine culture pending.  Will start on Augmentin and Flagyl.  She will continue her home medications as prescribed.  She was instructed to follow up outpatient.  She will return to the ED if her symptoms worsen. TAMI Maldonado 02/13 1123                  MDM  Number of Diagnoses or Management Options  Colitis:   Right flank pain:   UTI (urinary tract infection), uncomplicated:      Amount and/or Complexity of Data Reviewed  Clinical lab tests: reviewed  Tests in the radiology section of CPT®: reviewed    Patient Progress  Patient progress: improved      Final diagnoses:   Colitis   UTI (urinary tract infection), uncomplicated   Right flank pain            TAMI Maldonado  02/13/18 1127

## 2018-02-15 ENCOUNTER — HOSPITAL ENCOUNTER (EMERGENCY)
Facility: HOSPITAL | Age: 43
Discharge: HOME | End: 2018-02-15
Attending: EMERGENCY MEDICINE | Admitting: EMERGENCY MEDICINE

## 2018-02-15 ENCOUNTER — APPOINTMENT (OUTPATIENT)
Dept: ULTRASOUND IMAGING | Facility: HOSPITAL | Age: 43
End: 2018-02-15

## 2018-02-15 VITALS
OXYGEN SATURATION: 98 % | HEART RATE: 108 BPM | BODY MASS INDEX: 24.14 KG/M2 | SYSTOLIC BLOOD PRESSURE: 112 MMHG | RESPIRATION RATE: 18 BRPM | HEIGHT: 69 IN | TEMPERATURE: 98.2 F | WEIGHT: 163 LBS | DIASTOLIC BLOOD PRESSURE: 82 MMHG

## 2018-02-15 DIAGNOSIS — R10.9 ABDOMINAL PAIN, UNSPECIFIED ABDOMINAL LOCATION: Primary | ICD-10-CM

## 2018-02-15 DIAGNOSIS — N39.0 UTI (URINARY TRACT INFECTION), UNCOMPLICATED: ICD-10-CM

## 2018-02-15 DIAGNOSIS — R10.9 FLANK PAIN: ICD-10-CM

## 2018-02-15 LAB
027 TOXIN: NORMAL
ALBUMIN SERPL-MCNC: 4.1 G/DL (ref 3.5–5)
ALBUMIN/GLOB SERPL: 1.6 G/DL (ref 1.5–2.5)
ALP SERPL-CCNC: 70 U/L (ref 35–104)
ALT SERPL W P-5'-P-CCNC: 67 U/L (ref 10–36)
ANION GAP SERPL CALCULATED.3IONS-SCNC: 6.7 MMOL/L (ref 3.6–11.2)
AST SERPL-CCNC: 50 U/L (ref 10–30)
BACTERIA SPEC AEROBE CULT: NORMAL
BASOPHILS # BLD AUTO: 0.01 10*3/MM3 (ref 0–0.3)
BASOPHILS NFR BLD AUTO: 0.3 % (ref 0–2)
BILIRUB SERPL-MCNC: 0.9 MG/DL (ref 0.2–1.8)
BILIRUB UR QL STRIP: NEGATIVE
BUN BLD-MCNC: <5 MG/DL (ref 7–21)
BUN/CREAT SERPL: ABNORMAL (ref 7–25)
C DIFF TOX GENS STL QL NAA+PROBE: NEGATIVE
CALCIUM SPEC-SCNC: 9.1 MG/DL (ref 7.7–10)
CHLORIDE SERPL-SCNC: 107 MMOL/L (ref 99–112)
CLARITY UR: ABNORMAL
CO2 SERPL-SCNC: 27.3 MMOL/L (ref 24.3–31.9)
COLOR UR: ABNORMAL
CREAT BLD-MCNC: 0.64 MG/DL (ref 0.43–1.29)
DEPRECATED RDW RBC AUTO: 47.7 FL (ref 37–54)
EOSINOPHIL # BLD AUTO: 0.1 10*3/MM3 (ref 0–0.7)
EOSINOPHIL NFR BLD AUTO: 3 % (ref 0–5)
ERYTHROCYTE [DISTWIDTH] IN BLOOD BY AUTOMATED COUNT: 14.9 % (ref 11.5–14.5)
GFR SERPL CREATININE-BSD FRML MDRD: 101 ML/MIN/1.73
GLOBULIN UR ELPH-MCNC: 2.5 GM/DL
GLUCOSE BLD-MCNC: 100 MG/DL (ref 70–110)
GLUCOSE UR STRIP-MCNC: NEGATIVE MG/DL
HCT VFR BLD AUTO: 38.4 % (ref 37–47)
HGB BLD-MCNC: 12.7 G/DL (ref 12–16)
HGB UR QL STRIP.AUTO: ABNORMAL
IMM GRANULOCYTES # BLD: 0 10*3/MM3 (ref 0–0.03)
IMM GRANULOCYTES NFR BLD: 0 % (ref 0–0.5)
KETONES UR QL STRIP: ABNORMAL
LEUKOCYTE ESTERASE UR QL STRIP.AUTO: ABNORMAL
LIPASE SERPL-CCNC: 60 U/L (ref 13–60)
LYMPHOCYTES # BLD AUTO: 1.15 10*3/MM3 (ref 1–3)
LYMPHOCYTES NFR BLD AUTO: 34.3 % (ref 21–51)
MCH RBC QN AUTO: 28.7 PG (ref 27–33)
MCHC RBC AUTO-ENTMCNC: 33.1 G/DL (ref 33–37)
MCV RBC AUTO: 86.7 FL (ref 80–94)
MONOCYTES # BLD AUTO: 0.29 10*3/MM3 (ref 0.1–0.9)
MONOCYTES NFR BLD AUTO: 8.7 % (ref 0–10)
NEUTROPHILS # BLD AUTO: 1.8 10*3/MM3 (ref 1.4–6.5)
NEUTROPHILS NFR BLD AUTO: 53.7 % (ref 30–70)
NITRITE UR QL STRIP: NEGATIVE
OSMOLALITY SERPL CALC.SUM OF ELEC: NORMAL MOSM/KG (ref 273–305)
PH UR STRIP.AUTO: <=5 [PH] (ref 5–8)
PLATELET # BLD AUTO: 115 10*3/MM3 (ref 130–400)
PMV BLD AUTO: 11 FL (ref 6–10)
POTASSIUM BLD-SCNC: 3.5 MMOL/L (ref 3.5–5.3)
PROT SERPL-MCNC: 6.6 G/DL (ref 6–8)
PROT UR QL STRIP: ABNORMAL
RBC # BLD AUTO: 4.43 10*6/MM3 (ref 4.2–5.4)
SODIUM BLD-SCNC: 141 MMOL/L (ref 135–153)
SP GR UR STRIP: 1.02 (ref 1–1.03)
UROBILINOGEN UR QL STRIP: ABNORMAL
VALPROATE SERPL-MCNC: <1 MCG/ML (ref 50–100)
WBC NRBC COR # BLD: 3.35 10*3/MM3 (ref 4.5–12.5)

## 2018-02-15 PROCEDURE — 80053 COMPREHEN METABOLIC PANEL: CPT | Performed by: EMERGENCY MEDICINE

## 2018-02-15 PROCEDURE — 96374 THER/PROPH/DIAG INJ IV PUSH: CPT

## 2018-02-15 PROCEDURE — 76775 US EXAM ABDO BACK WALL LIM: CPT

## 2018-02-15 PROCEDURE — 96361 HYDRATE IV INFUSION ADD-ON: CPT

## 2018-02-15 PROCEDURE — 25010000002 PROMETHAZINE PER 50 MG: Performed by: EMERGENCY MEDICINE

## 2018-02-15 PROCEDURE — 83690 ASSAY OF LIPASE: CPT | Performed by: EMERGENCY MEDICINE

## 2018-02-15 PROCEDURE — 25010000002 HEPARIN FLUSH (PORCINE) 100 UNIT/ML SOLUTION: Performed by: EMERGENCY MEDICINE

## 2018-02-15 PROCEDURE — 85025 COMPLETE CBC W/AUTO DIFF WBC: CPT | Performed by: EMERGENCY MEDICINE

## 2018-02-15 PROCEDURE — 87086 URINE CULTURE/COLONY COUNT: CPT | Performed by: EMERGENCY MEDICINE

## 2018-02-15 PROCEDURE — 87493 C DIFF AMPLIFIED PROBE: CPT | Performed by: EMERGENCY MEDICINE

## 2018-02-15 PROCEDURE — 96375 TX/PRO/DX INJ NEW DRUG ADDON: CPT

## 2018-02-15 PROCEDURE — 76775 US EXAM ABDO BACK WALL LIM: CPT | Performed by: RADIOLOGY

## 2018-02-15 PROCEDURE — 80164 ASSAY DIPROPYLACETIC ACD TOT: CPT | Performed by: EMERGENCY MEDICINE

## 2018-02-15 PROCEDURE — 25010000002 MORPHINE PER 10 MG: Performed by: EMERGENCY MEDICINE

## 2018-02-15 PROCEDURE — 81003 URINALYSIS AUTO W/O SCOPE: CPT | Performed by: EMERGENCY MEDICINE

## 2018-02-15 PROCEDURE — 99284 EMERGENCY DEPT VISIT MOD MDM: CPT

## 2018-02-15 RX ORDER — PROMETHAZINE HYDROCHLORIDE 25 MG/ML
12.5 INJECTION, SOLUTION INTRAMUSCULAR; INTRAVENOUS ONCE
Status: COMPLETED | OUTPATIENT
Start: 2018-02-15 | End: 2018-02-15

## 2018-02-15 RX ORDER — MORPHINE SULFATE 2 MG/ML
2 INJECTION, SOLUTION INTRAMUSCULAR; INTRAVENOUS ONCE
Status: COMPLETED | OUTPATIENT
Start: 2018-02-15 | End: 2018-02-15

## 2018-02-15 RX ADMIN — MORPHINE SULFATE 2 MG: 2 INJECTION, SOLUTION INTRAMUSCULAR; INTRAVENOUS at 10:11

## 2018-02-15 RX ADMIN — SODIUM CHLORIDE 1000 ML: 9 INJECTION, SOLUTION INTRAVENOUS at 09:45

## 2018-02-15 RX ADMIN — PROMETHAZINE HYDROCHLORIDE 12.5 MG: 25 INJECTION INTRAMUSCULAR; INTRAVENOUS at 09:23

## 2018-02-15 RX ADMIN — HEPARIN SODIUM (PORCINE) LOCK FLUSH IV SOLN 100 UNIT/ML 300 UNITS: 100 SOLUTION at 12:25

## 2018-02-15 RX ADMIN — SODIUM CHLORIDE 1000 ML: 900 INJECTION, SOLUTION INTRAVENOUS at 09:23

## 2018-02-15 NOTE — ED NOTES
Patient reports having pain in her back after US, notified Dr. Grey.     Lili Trent RN  02/15/18 [id number]

## 2018-02-15 NOTE — DISCHARGE INSTRUCTIONS
Read and follow all instructions in this handout  Fill and take all medications as directed.  Follow up with primary doctor or clinic in the next two days  Return to ED if symptoms persist or worsen.  Return to ED if you have any other medical concerns.  As discussed, we are still not sure what is causing your abdominal pain. You could still have a surgical cause to your pain. If you develop worsening pain, fever, or can not drink fluids without vomiting return to the Emergency department immediately.               Abdominal Pain, Adult  Many things can cause belly (abdominal) pain. Most times, belly pain is not dangerous. Many cases of belly pain can be watched and treated at home. Sometimes belly pain is serious, though. Your doctor will try to find the cause of your belly pain.  Follow these instructions at home:  · Take over-the-counter and prescription medicines only as told by your doctor. Do not take medicines that help you poop (laxatives) unless told to by your doctor.  · Drink enough fluid to keep your pee (urine) clear or pale yellow.  · Watch your belly pain for any changes.  · Keep all follow-up visits as told by your doctor. This is important.  Contact a doctor if:  · Your belly pain changes or gets worse.  · You are not hungry, or you lose weight without trying.  · You are having trouble pooping (constipated) or have watery poop (diarrhea) for more than 2-3 days.  · You have pain when you pee or poop.  · Your belly pain wakes you up at night.  · Your pain gets worse with meals, after eating, or with certain foods.  · You are throwing up and cannot keep anything down.  · You have a fever.  Get help right away if:  · Your pain does not go away as soon as your doctor says it should.  · You cannot stop throwing up.  · Your pain is only in areas of your belly, such as the right side or the left lower part of the belly.  · You have bloody or black poop, or poop that looks like tar.  · You have very  bad pain, cramping, or bloating in your belly.  · You have signs of not having enough fluid or water in your body (dehydration), such as:  ¨ Dark pee, very little pee, or no pee.  ¨ Cracked lips.  ¨ Dry mouth.  ¨ Sunken eyes.  ¨ Sleepiness.  ¨ Weakness.  This information is not intended to replace advice given to you by your health care provider. Make sure you discuss any questions you have with your health care provider.  Document Released: 06/05/2009 Document Revised: 07/07/2017 Document Reviewed: 05/31/2017  [employer] Interactive Patient Education © 2017 [employer]      Abdominal Pain, Adult  Many things can cause belly (abdominal) pain. Most times, belly pain is not dangerous. Many cases of belly pain can be watched and treated at home. Sometimes belly pain is serious, though. Your doctor will try to find the cause of your belly pain.  Follow these instructions at home:  · Take over-the-counter and prescription medicines only as told by your doctor. Do not take medicines that help you poop (laxatives) unless told to by your doctor.  · Drink enough fluid to keep your pee (urine) clear or pale yellow.  · Watch your belly pain for any changes.  · Keep all follow-up visits as told by your doctor. This is important.  Contact a doctor if:  · Your belly pain changes or gets worse.  · You are not hungry, or you lose weight without trying.  · You are having trouble pooping (constipated) or have watery poop (diarrhea) for more than 2-3 days.  · You have pain when you pee or poop.  · Your belly pain wakes you up at night.  · Your pain gets worse with meals, after eating, or with certain foods.  · You are throwing up and cannot keep anything down.  · You have a fever.  Get help right away if:  · Your pain does not go away as soon as your doctor says it should.  · You cannot stop throwing up.  · Your pain is only in areas of your belly, such as the right side or the left lower part of the belly.  · You have bloody or black  poop, or poop that looks like tar.  · You have very bad pain, cramping, or bloating in your belly.  · You have signs of not having enough fluid or water in your body (dehydration), such as:  ¨ Dark pee, very little pee, or no pee.  ¨ Cracked lips.  ¨ Dry mouth.  ¨ Sunken eyes.  ¨ Sleepiness.  ¨ Weakness.  This information is not intended to replace advice given to you by your health care provider. Make sure you discuss any questions you have with your health care provider.  Document Released: 06/05/2009 Document Revised: 07/07/2017 Document Reviewed: 05/31/2017  [employer] Interactive Patient Education © 2017 [employer]      Abdominal Pain, Adult  Many things can cause belly (abdominal) pain. Most times, belly pain is not dangerous. Many cases of belly pain can be watched and treated at home. Sometimes belly pain is serious, though. Your doctor will try to find the cause of your belly pain.  Follow these instructions at home:  · Take over-the-counter and prescription medicines only as told by your doctor. Do not take medicines that help you poop (laxatives) unless told to by your doctor.  · Drink enough fluid to keep your pee (urine) clear or pale yellow.  · Watch your belly pain for any changes.  · Keep all follow-up visits as told by your doctor. This is important.  Contact a doctor if:  · Your belly pain changes or gets worse.  · You are not hungry, or you lose weight without trying.  · You are having trouble pooping (constipated) or have watery poop (diarrhea) for more than 2-3 days.  · You have pain when you pee or poop.  · Your belly pain wakes you up at night.  · Your pain gets worse with meals, after eating, or with certain foods.  · You are throwing up and cannot keep anything down.  · You have a fever.  Get help right away if:  · Your pain does not go away as soon as your doctor says it should.  · You cannot stop throwing up.  · Your pain is only in areas of your belly, such as the right side or the left  lower part of the belly.  · You have bloody or black poop, or poop that looks like tar.  · You have very bad pain, cramping, or bloating in your belly.  · You have signs of not having enough fluid or water in your body (dehydration), such as:  ¨ Dark pee, very little pee, or no pee.  ¨ Cracked lips.  ¨ Dry mouth.  ¨ Sunken eyes.  ¨ Sleepiness.  ¨ Weakness.  This information is not intended to replace advice given to you by your health care provider. Make sure you discuss any questions you have with your health care provider.  Document Released: 06/05/2009 Document Revised: 07/07/2017 Document Reviewed: 05/31/2017  [employer] Interactive Patient Education © 2017 [employer]      Abdominal Pain, Adult  Many things can cause belly (abdominal) pain. Most times, belly pain is not dangerous. Many cases of belly pain can be watched and treated at home. Sometimes belly pain is serious, though. Your doctor will try to find the cause of your belly pain.  Follow these instructions at home:  · Take over-the-counter and prescription medicines only as told by your doctor. Do not take medicines that help you poop (laxatives) unless told to by your doctor.  · Drink enough fluid to keep your pee (urine) clear or pale yellow.  · Watch your belly pain for any changes.  · Keep all follow-up visits as told by your doctor. This is important.  Contact a doctor if:  · Your belly pain changes or gets worse.  · You are not hungry, or you lose weight without trying.  · You are having trouble pooping (constipated) or have watery poop (diarrhea) for more than 2-3 days.  · You have pain when you pee or poop.  · Your belly pain wakes you up at night.  · Your pain gets worse with meals, after eating, or with certain foods.  · You are throwing up and cannot keep anything down.  · You have a fever.  Get help right away if:  · Your pain does not go away as soon as your doctor says it should.  · You cannot stop throwing up.  · Your pain is only in  areas of your belly, such as the right side or the left lower part of the belly.  · You have bloody or black poop, or poop that looks like tar.  · You have very bad pain, cramping, or bloating in your belly.  · You have signs of not having enough fluid or water in your body (dehydration), such as:  ¨ Dark pee, very little pee, or no pee.  ¨ Cracked lips.  ¨ Dry mouth.  ¨ Sunken eyes.  ¨ Sleepiness.  ¨ Weakness.  This information is not intended to replace advice given to you by your health care provider. Make sure you discuss any questions you have with your health care provider.  Document Released: 06/05/2009 Document Revised: 07/07/2017 Document Reviewed: 05/31/2017  [employer] Interactive Patient Education © 2017 [employer]      Abdominal Pain, Adult  Many things can cause belly (abdominal) pain. Most times, belly pain is not dangerous. Many cases of belly pain can be watched and treated at home. Sometimes belly pain is serious, though. Your doctor will try to find the cause of your belly pain.  Follow these instructions at home:  · Take over-the-counter and prescription medicines only as told by your doctor. Do not take medicines that help you poop (laxatives) unless told to by your doctor.  · Drink enough fluid to keep your pee (urine) clear or pale yellow.  · Watch your belly pain for any changes.  · Keep all follow-up visits as told by your doctor. This is important.  Contact a doctor if:  · Your belly pain changes or gets worse.  · You are not hungry, or you lose weight without trying.  · You are having trouble pooping (constipated) or have watery poop (diarrhea) for more than 2-3 days.  · You have pain when you pee or poop.  · Your belly pain wakes you up at night.  · Your pain gets worse with meals, after eating, or with certain foods.  · You are throwing up and cannot keep anything down.  · You have a fever.  Get help right away if:  · Your pain does not go away as soon as your doctor says it  "should.  · You cannot stop throwing up.  · Your pain is only in areas of your belly, such as the right side or the left lower part of the belly.  · You have bloody or black poop, or poop that looks like tar.  · You have very bad pain, cramping, or bloating in your belly.  · You have signs of not having enough fluid or water in your body (dehydration), such as:  ¨ Dark pee, very little pee, or no pee.  ¨ Cracked lips.  ¨ Dry mouth.  ¨ Sunken eyes.  ¨ Sleepiness.  ¨ Weakness.  This information is not intended to replace advice given to you by your health care provider. Make sure you discuss any questions you have with your health care provider.  Document Released: 06/05/2009 Document Revised: 07/07/2017 Document Reviewed: 05/31/2017  [employer] Interactive Patient Education © 2017 [employer]  Hypertension  Hypertension, commonly called high blood pressure, is when the force of blood pumping through the arteries is too strong. The arteries are the blood vessels that carry blood from the heart throughout the body. Hypertension forces the heart to work harder to pump blood and may cause arteries to become narrow or stiff. Having untreated or uncontrolled hypertension can cause heart attacks, strokes, kidney disease, and other problems.  A blood pressure reading consists of a higher number over a lower number. Ideally, your blood pressure should be below 120/80. The first (\"top\") number is called the systolic pressure. It is a measure of the pressure in your arteries as your heart beats. The second (\"bottom\") number is called the diastolic pressure. It is a measure of the pressure in your arteries as the heart relaxes.  What are the causes?  The cause of this condition is not known.  What increases the risk?  Some risk factors for high blood pressure are under your control. Others are not.  Factors you can change   · Smoking.  · Having type 2 diabetes mellitus, high cholesterol, or both.  · Not getting enough exercise or " physical activity.  · Being overweight.  · Having too much fat, sugar, calories, or salt (sodium) in your diet.  · Drinking too much alcohol.  Factors that are difficult or impossible to change   · Having chronic kidney disease.  · Having a family history of high blood pressure.  · Age. Risk increases with age.  · Race. You may be at higher risk if you are -American.  · Gender. Men are at higher risk than women before age 45. After age 65, women are at higher risk than men.  · Having obstructive sleep apnea.  · Stress.  What are the signs or symptoms?  Extremely high blood pressure (hypertensive crisis) may cause:  · Headache.  · Anxiety.  · Shortness of breath.  · Nosebleed.  · Nausea and vomiting.  · Severe chest pain.  · Jerky movements you cannot control (seizures).  How is this diagnosed?  This condition is diagnosed by measuring your blood pressure while you are seated, with your arm resting on a surface. The cuff of the blood pressure monitor will be placed directly against the skin of your upper arm at the level of your heart. It should be measured at least twice using the same arm. Certain conditions can cause a difference in blood pressure between your right and left arms.  Certain factors can cause blood pressure readings to be lower or higher than normal (elevated) for a short period of time:  · When your blood pressure is higher when you are in a health care provider's office than when you are at home, this is called white coat hypertension. Most people with this condition do not need medicines.  · When your blood pressure is higher at home than when you are in a health care provider's office, this is called masked hypertension. Most people with this condition may need medicines to control blood pressure.  If you have a high blood pressure reading during one visit or you have normal blood pressure with other risk factors:  · You may be asked to return on a different day to have your blood  pressure checked again.  · You may be asked to monitor your blood pressure at home for 1 week or longer.  If you are diagnosed with hypertension, you may have other blood or imaging tests to help your health care provider understand your overall risk for other conditions.  How is this treated?  This condition is treated by making healthy lifestyle changes, such as eating healthy foods, exercising more, and reducing your alcohol intake. Your health care provider may prescribe medicine if lifestyle changes are not enough to get your blood pressure under control, and if:  · Your systolic blood pressure is above 130.  · Your diastolic blood pressure is above 80.  Your personal target blood pressure may vary depending on your medical conditions, your age, and other factors.  Follow these instructions at home:  Eating and drinking   · Eat a diet that is high in fiber and potassium, and low in sodium, added sugar, and fat. An example eating plan is called the DASH (Dietary Approaches to Stop Hypertension) diet. To eat this way:  ¨ Eat plenty of fresh fruits and vegetables. Try to fill half of your plate at each meal with fruits and vegetables.  ¨ Eat whole grains, such as whole wheat pasta, brown rice, or whole grain bread. Fill about one quarter of your plate with whole grains.  ¨ Eat or drink low-fat dairy products, such as skim milk or low-fat yogurt.  ¨ Avoid fatty cuts of meat, processed or cured meats, and poultry with skin. Fill about one quarter of your plate with lean proteins, such as fish, chicken without skin, beans, eggs, and tofu.  ¨ Avoid premade and processed foods. These tend to be higher in sodium, added sugar, and fat.  · Reduce your daily sodium intake. Most people with hypertension should eat less than 1,500 mg of sodium a day.  · Limit alcohol intake to no more than 1 drink a day for nonpregnant women and 2 drinks a day for men. One drink equals 12 oz of beer, 5 oz of wine, or 1½ oz of hard  liquor.  Lifestyle   · Work with your health care provider to maintain a healthy body weight or to lose weight. Ask what an ideal weight is for you.  · Get at least 30 minutes of exercise that causes your heart to beat faster (aerobic exercise) most days of the week. Activities may include walking, swimming, or biking.  · Include exercise to strengthen your muscles (resistance exercise), such as pilates or lifting weights, as part of your weekly exercise routine. Try to do these types of exercises for 30 minutes at least 3 days a week.  · Do not use any products that contain nicotine or tobacco, such as cigarettes and e-cigarettes. If you need help quitting, ask your health care provider.  · Monitor your blood pressure at home as told by your health care provider.  · Keep all follow-up visits as told by your health care provider. This is important.  Medicines   · Take over-the-counter and prescription medicines only as told by your health care provider. Follow directions carefully. Blood pressure medicines must be taken as prescribed.  · Do not skip doses of blood pressure medicine. Doing this puts you at risk for problems and can make the medicine less effective.  · Ask your health care provider about side effects or reactions to medicines that you should watch for.  Contact a health care provider if:  · You think you are having a reaction to a medicine you are taking.  · You have headaches that keep coming back (recurring).  · You feel dizzy.  · You have swelling in your ankles.  · You have trouble with your vision.  Get help right away if:  · You develop a severe headache or confusion.  · You have unusual weakness or numbness.  · You feel faint.  · You have severe pain in your chest or abdomen.  · You vomit repeatedly.  · You have trouble breathing.  Summary  · Hypertension is when the force of blood pumping through your arteries is too strong. If this condition is not controlled, it may put you at risk for  serious complications.  · Your personal target blood pressure may vary depending on your medical conditions, your age, and other factors. For most people, a normal blood pressure is less than 120/80.  · Hypertension is treated with lifestyle changes, medicines, or a combination of both. Lifestyle changes include weight loss, eating a healthy, low-sodium diet, exercising more, and limiting alcohol.  This information is not intended to replace advice given to you by your health care provider. Make sure you discuss any questions you have with your health care provider.  Document Released: 12/18/2006 Document Revised: 11/15/2017 Document Reviewed: 11/15/2017  [employer] Interactive Patient Education © 2017 [employer]

## 2018-02-15 NOTE — ED NOTES
Patient reports having N/V and diarrhea for several days along with a kidney stone. Patient states she has blood in her urine. Patient reports she has been taking abx x 5 days.   Patient resting quietly on stretcher with no complaints. Pt AAOx4. No respiratory distress noted. Respirations even and unlabored. Pt denies any needs at this time. Skin PWD. Pt family at bedside. Will continue to monitor and follow plan of care. Bed rails up x 2, bed in lowest position, call light within reach.      Lili Trent RN  02/15/18 [id number]

## 2018-02-15 NOTE — ED PROVIDER NOTES
Subjective   HPI Comments: Patient is a 43-year-old lady who I actually saw here in the emergency department 2 days ago was diagnosed with colitis and UTI.  Patient has a long history of kidney stones and she was here for flank pain at that time.  Patient presents today with right flank pain she says she has haven't severe pain overnight in her right flank and that she thinks she passed a kidney stone approximately 10 minutes prior to coming in the emergency department.  She still having some hematuria but the pain is resolved.  She's had no fever no chills no change in urination or bowel movements other than the hematuria.  She has been on recent antibiotics and with a history of colitis I am getting get a C. difficile on her.  She denies any abdominal pain currently she is still having some diarrhea but no melena or bright red blood per rectum.  No chest pain or shortness of breath.    Patient is a 43 y.o. female presenting with flank pain.   Flank Pain   Associated symptoms: diarrhea, hematuria and nausea    Associated symptoms: no chest pain, no dysuria and no fever        Review of Systems   Constitutional: Negative.  Negative for fever.   HENT: Negative.    Respiratory: Negative.    Cardiovascular: Negative.  Negative for chest pain.   Gastrointestinal: Positive for abdominal pain (resolved), diarrhea and nausea.   Endocrine: Negative.    Genitourinary: Positive for flank pain and hematuria. Negative for dysuria.   Skin: Negative.    Neurological: Negative.    Psychiatric/Behavioral: Negative.    All other systems reviewed and are negative.      Past Medical History:   Diagnosis Date   • Abdominal pain    • Abdominal swelling    • Hoyos's disease    • Bipolar 1 disorder    • Brain tumor     R Frontal Lobe per pt   • Brain tumor 2014   • Brain tumor (benign)    • Constipation    • DDD (degenerative disc disease), cervical 05/29/2017   • DDD (degenerative disc disease), cervical    • Diarrhea    • Fibromyalgia     • IBS (irritable bowel syndrome)    • Migraine    • Nausea & vomiting    • PONV (postoperative nausea and vomiting)    • PTSD (post-traumatic stress disorder)    • Rectal bleeding        Allergies   Allergen Reactions   • Ativan [Lorazepam] Hallucinations     confusion   • Sulfa Antibiotics Shortness Of Breath and Swelling   • Compazine [Prochlorperazine Edisylate] Hives   • Demerol [Meperidine] Hives   • Droperidol Itching   • Metoclopramide Swelling   • Toradol [Ketorolac Tromethamine] Hives and Itching   • Zofran [Ondansetron Hcl] Rash       Past Surgical History:   Procedure Laterality Date   • ANAL SCOPE N/A 7/28/2016    Procedure: ANAL SCOPE;  Surgeon: Kael Lopez MD;  Location: [facility] OR;  Service:    • APPENDECTOMY     • COLONOSCOPY N/A 6/30/2016    Procedure: COLONOSCOPY  CPTCODE:08890;  Surgeon: Jose Antonio Belle III, MD;  Location: [facility] OR;  Service:    • COLONOSCOPY N/A 7/7/2016    Procedure: COLONOSCOPY (69657) CPT;  Surgeon: Jose Antonio Belle III, MD;  Location: [facility] OR;  Service:    • CYSTOSCOPY RETROGRADE PYELOGRAM Bilateral 4/28/2017    Procedure: CYSTOSCOPY RETROGRADE PYELOGRAM;  Surgeon: Mu Blunt MD;  Location: [facility] OR;  Service:    • ENDOSCOPY N/A 6/30/2016    Procedure: ESOPHAGOGASTRODUODENOSCOPY WITH BIOPSY  CPTCODE:49935;  Surgeon: Jose Antonio Belle III, MD;  Location: [facility] OR;  Service:    • HEMORRHOIDECTOMY N/A 7/28/2016    Procedure: HEMORRHOID STAPLING;  Surgeon: Kael Lopez MD;  Location: [facility] OR;  Service:    • HYSTERECTOMY     • KNEE SURGERY     • LAPAROSCOPIC SALPINGOOPHERECTOMY     • PORTACATH PLACEMENT N/A 7/28/2017    Procedure: INSERTION OF PORTACATH;  Surgeon: Celso Arredondo MD;  Location: [facility] OR;  Service:    • SHOULDER SURGERY      3 times       Family History   Problem Relation Age of Onset   • Crohn's disease Other    • Hypertension Other    • Diabetes Other    • Irritable bowel syndrome Other        Social History     Social  History   • Marital status:      Spouse name: N/A   • Number of children: N/A   • Years of education: N/A     Social History Main Topics   • Smoking status: Former Smoker     Packs/day: 1.00     Years: 20.00     Quit date: 11/1/2015   • Smokeless tobacco: Never Used   • Alcohol use No   • Drug use: Yes     Special: Marijuana      Comment: for pain   • Sexual activity: Defer     Other Topics Concern   • None     Social History Narrative           Objective   Physical Exam   Constitutional: She is oriented to person, place, and time.   HENT:   Head: Normocephalic and atraumatic.   Eyes: Pupils are equal, round, and reactive to light.   Neck: Normal range of motion.   Cardiovascular: Normal rate and intact distal pulses.    Pulmonary/Chest: Effort normal and breath sounds normal.   Abdominal: Soft. She exhibits no distension. There is no tenderness. There is no rebound and no guarding.   No tenderness to deep palpation anywhere in the abdomen specifically no tenderness over McBurney's point negative Mac sign.  No pulsatile mass.  Benign abdomen.    No CVA tenderness   Musculoskeletal: Normal range of motion.   Neurological: She is alert and oriented to person, place, and time.       Procedures         ED Course  ED Course   Comment By Time   The patient is feeling better and is asking for discharge home.  The patient has no tenderness to deep palpation anywhere in the abdomen at this time.  The patient's tolerating by mouth.  The patient is asking for discharge home.  I'll discharge the patient home at this time.  I had a long discussion with the patient about the uncertainty to the cause of their pain, that they could still have a surgical cause the pain, and all reasons to return the emergency Department.  The patient heard an verbalized understanding of this conversation. Wisam Grey MD 02/15 9271   She says she felt like she passed a stone right before she got here.  She was no longer  having flank pain.  I think the patient's appropriate for outpatient follow-up.  She has a benign abdominal exam.  She tolerating by mouth here.  On discharge patient home with continued treatment for suspected colitis.  She understands all reasons return and promises to follow-up as discussed both on her prior visit as well as today. Wisam Grey MD 02/15 1464                  MDM  Number of Diagnoses or Management Options  Abdominal pain, unspecified abdominal location:   Flank pain:   UTI (urinary tract infection), uncomplicated:      Amount and/or Complexity of Data Reviewed  Clinical lab tests: ordered and reviewed  Tests in the radiology section of CPT®: ordered and reviewed  Decide to obtain previous medical records or to obtain history from someone other than the patient: yes        Final diagnoses:   Abdominal pain, unspecified abdominal location   Flank pain   UTI (urinary tract infection), uncomplicated            Wisam Grey MD  02/15/18 7452

## 2018-02-17 LAB — BACTERIA SPEC AEROBE CULT: NORMAL

## 2018-02-18 LAB
BACTERIA SPEC AEROBE CULT: NORMAL
BACTERIA SPEC AEROBE CULT: NORMAL

## 2018-02-20 ENCOUNTER — OFFICE VISIT (OUTPATIENT)
Dept: UROLOGY | Facility: CLINIC | Age: 43
End: 2018-02-20

## 2018-02-20 VITALS — BODY MASS INDEX: 23.9 KG/M2 | WEIGHT: 161.38 LBS | HEIGHT: 69 IN

## 2018-02-20 DIAGNOSIS — R39.89 BLADDER PAIN: ICD-10-CM

## 2018-02-20 DIAGNOSIS — IMO0002 URETHRAL STENOSIS: Primary | ICD-10-CM

## 2018-02-20 PROCEDURE — 53661 DILATION OF URETHRA: CPT | Performed by: UROLOGY

## 2018-02-20 PROCEDURE — 96372 THER/PROPH/DIAG INJ SC/IM: CPT | Performed by: UROLOGY

## 2018-02-20 RX ORDER — OXYCODONE HYDROCHLORIDE AND ACETAMINOPHEN 5; 325 MG/1; MG/1
TABLET ORAL
Qty: 20 TABLET | Refills: 0 | Status: SHIPPED | OUTPATIENT
Start: 2018-02-20 | End: 2018-03-22 | Stop reason: HOSPADM

## 2018-02-20 RX ORDER — GENTAMICIN SULFATE 40 MG/ML
80 INJECTION, SOLUTION INTRAMUSCULAR; INTRAVENOUS ONCE
Status: COMPLETED | OUTPATIENT
Start: 2018-02-20 | End: 2018-02-20

## 2018-02-20 RX ADMIN — GENTAMICIN SULFATE 80 MG: 40 INJECTION, SOLUTION INTRAMUSCULAR; INTRAVENOUS at 12:52

## 2018-02-20 NOTE — PROGRESS NOTES
Chief Complaint:          Chief Complaint   Patient presents with   • URETHRAL STENOSIS     DILATATION       HPI:   43 y.o. female.  43 year old white female. Accompanied by  with IgA nephropathy, hepatitis C, severe urethral stenosis who presents for urethral dilation.  She is desirous of more pain medication but I explained to her carefully as long as she is on hydrocodone and I will not give her any more pain medication.  And if she's not on it she can only have several pills around the time of her dilation.  I also explained to her there is no permanent fix of the situation other than self dilatation which she refuses to do.Today she wants to learn to do intermittent catheter.  She gets pain medication from her primary care provider and she will be given pain medication only for short supply after painful dilatation this is been discussed with her primary care physician he will have dilatation no more than every 6 weeks in this office.  She returns today for follow-up.  She is now doing dramatically better.  She is now catheterizing herself intermittently.  It's helped dramatically I went ahead after prep and drape in a sterile fashion and dilated her sequentially to 24 Albanian which was the max she can take.  There were no complications.  Overall, she's doing well, she is not gotten into see the nephrologist as yet for inexplicable reasons.  I reaffirmed the importance of a workup of her IgA nephropathy.  She is here for follow-up she wants to be dilated.  I went ahead and recommended this.  She was dilated without complication to 26 Albanian.  She's been doing self dilation at home I gave her a small number of pain medication because of the procedure.       Past Medical History:        Past Medical History:   Diagnosis Date   • Abdominal pain    • Abdominal swelling    • Hoyos's disease    • Bipolar 1 disorder    • Brain tumor     R Frontal Lobe per pt   • Brain tumor 2014   • Brain tumor (benign)    •  Constipation    • DDD (degenerative disc disease), cervical 05/29/2017   • DDD (degenerative disc disease), cervical    • Diarrhea    • Fibromyalgia    • IBS (irritable bowel syndrome)    • Migraine    • Nausea & vomiting    • PONV (postoperative nausea and vomiting)    • PTSD (post-traumatic stress disorder)    • Rectal bleeding          Current Meds:     Current Outpatient Prescriptions   Medication Sig Dispense Refill   • amoxicillin-clavulanate (AUGMENTIN) 875-125 MG per tablet Take 1 tablet by mouth 2 (Two) Times a Day for 10 days. 20 tablet 0   • azelastine (ASTEPRO) 0.15 % solution nasal spray 2 sprays into each nostril 2 (Two) Times a Day. 30 mL 0   • benzonatate (TESSALON) 200 MG capsule Take 1 capsule by mouth 3 (Three) Times a Day As Needed for Cough. 30 capsule 0   • busPIRone (BUSPAR) 15 MG tablet Take 15 mg by mouth 3 (three) times a day        • butorphanol (STADOL) 10 MG/ML nasal spray 1 spray into each nostril Daily.     • carbidopa-levodopa (SINEMET)  MG per tablet Take 1 tablet by mouth 3 (Three) Times a Day.     • cetirizine (zyrTEC) 10 MG tablet Take 1 tablet by mouth Daily. 30 tablet 0   • dicyclomine (BENTYL) 20 MG tablet Take 1 tablet by mouth Every 6 (Six) Hours As Needed (Abdominal Cramping). 10 tablet 0   • divalproex (DEPAKOTE) 500 MG DR tablet Take 250 mg by mouth 3 (Three) Times a Day.     • famotidine (PEPCID) 40 MG tablet Take 40 mg by mouth 2 (Two) Times a Day As Needed for heartburn.     • guaiFENesin (MUCINEX) 600 MG 12 hr tablet Take 2 tablets by mouth 2 (Two) Times a Day. 20 tablet 0   • HYDROcodone-acetaminophen (NORCO) 7.5-325 MG per tablet Take 1 tablet by mouth Every 6 (Six) Hours As Needed for Moderate Pain (4-6).     • metroNIDAZOLE (FLAGYL) 500 MG tablet Take 1 tablet by mouth 4 (Four) Times a Day for 10 days. 40 tablet 0   • oxybutynin (DITROPAN) 5 MG tablet Take 1 tablet by mouth 3 (Three) Times a Day. 90 tablet 11   • oxyCODONE-acetaminophen (PERCOCET)  MG  per tablet Take 1 tablet by mouth Every 6 (Six) Hours As Needed for Moderate Pain . 12 tablet 0   • oxyCODONE-acetaminophen (PERCOCET) 5-325 MG per tablet 1 to 2 Tablets Every 6 Hours as needed for PAIN 20 tablet 0   • oxyCODONE-acetaminophen (PERCOCET) 5-325 MG per tablet 1 to 2 Tablets Every 6 Hours as needed for PAIN 20 tablet 0   • pantoprazole (PROTONIX) 40 MG EC tablet Take 40 mg by mouth Daily As Needed.     • polyethylene glycol (GoLYTELY) 236 g solution Starting at 6 pm on day prior to procedure, drink 8 ounces every 15 minutes until all gone or stools are clear. May add flavor packet. 4000 mL 0   • potassium chloride (K-DUR) 10 MEQ CR tablet Take 1 tablet by mouth Daily. 12 tablet 0   • promethazine (PHENERGAN) 25 MG tablet Take 1 tablet by mouth Every 6 (Six) Hours As Needed for Nausea or Vomiting. 30 tablet 0   • sertraline (ZOLOFT) 100 MG tablet Take 100 mg by mouth 2 (Two) Times a Day.     • SUMAtriptan (IMITREX) 6 MG/0.5ML solution injection Inject 6 mg under the skin 1 (One) Time.       Current Facility-Administered Medications   Medication Dose Route Frequency Provider Last Rate Last Dose   • gentamicin (GARAMYCIN) injection 80 mg  80 mg Intramuscular Once Mu Blunt MD            Allergies:      Allergies   Allergen Reactions   • Ativan [Lorazepam] Hallucinations     confusion   • Sulfa Antibiotics Shortness Of Breath and Swelling   • Compazine [Prochlorperazine Edisylate] Hives   • Demerol [Meperidine] Hives   • Droperidol Itching   • Metoclopramide Swelling   • Toradol [Ketorolac Tromethamine] Hives and Itching   • Zofran [Ondansetron Hcl] Rash        Past Surgical History:     Past Surgical History:   Procedure Laterality Date   • ANAL SCOPE N/A 7/28/2016    Procedure: ANAL SCOPE;  Surgeon: Kael Lopez MD;  Location:  [facility] OR;  Service:    • APPENDECTOMY     • COLONOSCOPY N/A 6/30/2016    Procedure: COLONOSCOPY  CPTCODE:59341;  Surgeon: Jose Antonio Belle III, MD;  Location:   [facility] OR;  Service:    • COLONOSCOPY N/A 7/7/2016    Procedure: COLONOSCOPY (23142) CPT;  Surgeon: Jose Antonio Belle III, MD;  Location: [facility] OR;  Service:    • CYSTOSCOPY RETROGRADE PYELOGRAM Bilateral 4/28/2017    Procedure: CYSTOSCOPY RETROGRADE PYELOGRAM;  Surgeon: Mu Blunt MD;  Location: [facility] OR;  Service:    • ENDOSCOPY N/A 6/30/2016    Procedure: ESOPHAGOGASTRODUODENOSCOPY WITH BIOPSY  CPTCODE:94697;  Surgeon: Jose Antonio Belle III, MD;  Location: [facility] OR;  Service:    • HEMORRHOIDECTOMY N/A 7/28/2016    Procedure: HEMORRHOID STAPLING;  Surgeon: Keal Lopez MD;  Location: [facility] OR;  Service:    • HYSTERECTOMY     • KNEE SURGERY     • LAPAROSCOPIC SALPINGOOPHERECTOMY     • PORTACATH PLACEMENT N/A 7/28/2017    Procedure: INSERTION OF PORTACATH;  Surgeon: Celso Arredondo MD;  Location: [facility] OR;  Service:    • SHOULDER SURGERY      3 times         Social History:     Social History     Social History   • Marital status:      Spouse name: N/A   • Number of children: N/A   • Years of education: N/A     Occupational History   • Not on file.     Social History Main Topics   • Smoking status: Former Smoker     Packs/day: 1.00     Years: 20.00     Quit date: 11/1/2015   • Smokeless tobacco: Never Used   • Alcohol use No   • Drug use: Yes     Special: Marijuana      Comment: for pain   • Sexual activity: Defer     Other Topics Concern   • Not on file     Social History Narrative       Family History:     Family History   Problem Relation Age of Onset   • Crohn's disease Other    • Hypertension Other    • Diabetes Other    • Irritable bowel syndrome Other        Review of Systems:     Review of Systems   Constitutional: Negative.  Negative for activity change, appetite change, chills, diaphoresis, fatigue and unexpected weight change.   HENT: Negative for congestion, dental problem, drooling, ear discharge, ear pain, facial swelling, hearing loss, mouth sores, nosebleeds,  postnasal drip, rhinorrhea, sinus pressure, sneezing, sore throat, tinnitus, trouble swallowing and voice change.    Eyes: Negative.  Negative for photophobia, pain, discharge, redness, itching and visual disturbance.   Respiratory: Negative.  Negative for apnea, cough, choking, chest tightness, shortness of breath, wheezing and stridor.    Cardiovascular: Negative.  Negative for chest pain, palpitations and leg swelling.   Gastrointestinal: Negative.  Negative for abdominal distention, abdominal pain, anal bleeding, blood in stool, constipation, diarrhea, nausea, rectal pain and vomiting.   Endocrine: Negative.  Negative for cold intolerance, heat intolerance, polydipsia, polyphagia and polyuria.   Genitourinary: Positive for difficulty urinating.   Musculoskeletal: Negative.  Negative for arthralgias, back pain, gait problem, joint swelling, myalgias, neck pain and neck stiffness.   Skin: Negative.  Negative for color change, pallor, rash and wound.   Allergic/Immunologic: Negative.  Negative for environmental allergies, food allergies and immunocompromised state.   Neurological: Negative.  Negative for dizziness, tremors, seizures, syncope, facial asymmetry, speech difficulty, weakness, light-headedness, numbness and headaches.   Hematological: Negative.  Negative for adenopathy. Does not bruise/bleed easily.   Psychiatric/Behavioral: Negative for agitation, behavioral problems, confusion, decreased concentration, dysphoric mood, hallucinations, self-injury, sleep disturbance and suicidal ideas. The patient is not nervous/anxious and is not hyperactive.    All other systems reviewed and are negative.      Physical Exam:     Physical Exam   Constitutional: She appears well-developed and well-nourished.   HENT:   Head: Normocephalic and atraumatic.   Right Ear: External ear normal.   Left Ear: External ear normal.   Mouth/Throat: Oropharynx is clear and moist.   Eyes: Conjunctivae are normal. Pupils are equal,  round, and reactive to light.   Cardiovascular: Normal rate, regular rhythm, normal heart sounds and intact distal pulses.    Pulmonary/Chest: Effort normal and breath sounds normal.   Abdominal: Soft. Bowel sounds are normal. She exhibits no distension and no mass. There is no tenderness. There is no rebound and no guarding.   Genitourinary: Vagina normal. No vaginal discharge found.   Musculoskeletal: Normal range of motion.   Neurological: She is alert. She has normal reflexes.   Skin: Skin is warm and dry.   Psychiatric: She has a normal mood and affect. Her behavior is normal. Judgment and thought content normal.       **History of present illness, past family social history review of systems all reviewed for accuracy    Procedure:     Urethral dilation-after an appropriate informed consent the patient was brought to the procedure suite.  The urethra was gently anesthetized with 10 cc of 2% viscous Xylocaine jelly.  After an adequate period of topical anesthesia and dilated with Reese sounds from [address] sequentially without complication the patient was given gentamicin as prophylaxis with 80 mg  Assessment/Plan:   Urethral stenosis-s/ post dilation           This document has been electronically signed by VERENICE FINK MD February 20, 2018 12:50 PM

## 2018-02-26 ENCOUNTER — APPOINTMENT (OUTPATIENT)
Dept: CT IMAGING | Facility: HOSPITAL | Age: 43
End: 2018-02-26

## 2018-02-26 ENCOUNTER — HOSPITAL ENCOUNTER (EMERGENCY)
Facility: HOSPITAL | Age: 43
Discharge: HOME | End: 2018-02-27
Attending: FAMILY MEDICINE | Admitting: FAMILY MEDICINE

## 2018-02-26 DIAGNOSIS — R07.9 CHEST PAIN IN ADULT: Primary | ICD-10-CM

## 2018-02-26 DIAGNOSIS — IMO0001 LUNG NODULE < 6CM ON CT: ICD-10-CM

## 2018-02-26 LAB
ALBUMIN SERPL-MCNC: 4.1 G/DL (ref 3.5–5)
ALBUMIN/GLOB SERPL: 1.8 G/DL (ref 1.5–2.5)
ALP SERPL-CCNC: 74 U/L (ref 35–104)
ALT SERPL W P-5'-P-CCNC: 57 U/L (ref 10–36)
ANION GAP SERPL CALCULATED.3IONS-SCNC: 6.2 MMOL/L (ref 3.6–11.2)
AST SERPL-CCNC: 52 U/L (ref 10–30)
BASOPHILS # BLD AUTO: 0.01 10*3/MM3 (ref 0–0.3)
BASOPHILS NFR BLD AUTO: 0.2 % (ref 0–2)
BILIRUB SERPL-MCNC: 0.6 MG/DL (ref 0.2–1.8)
BNP SERPL-MCNC: 27 PG/ML (ref 0–100)
BUN BLD-MCNC: 9 MG/DL (ref 7–21)
BUN/CREAT SERPL: 16.4 (ref 7–25)
CALCIUM SPEC-SCNC: 9 MG/DL (ref 7.7–10)
CHLORIDE SERPL-SCNC: 108 MMOL/L (ref 99–112)
CO2 SERPL-SCNC: 26.8 MMOL/L (ref 24.3–31.9)
CREAT BLD-MCNC: 0.55 MG/DL (ref 0.43–1.29)
DEPRECATED RDW RBC AUTO: 46.1 FL (ref 37–54)
EOSINOPHIL # BLD AUTO: 0.09 10*3/MM3 (ref 0–0.7)
EOSINOPHIL NFR BLD AUTO: 2 % (ref 0–5)
ERYTHROCYTE [DISTWIDTH] IN BLOOD BY AUTOMATED COUNT: 14.5 % (ref 11.5–14.5)
GFR SERPL CREATININE-BSD FRML MDRD: 121 ML/MIN/1.73
GLOBULIN UR ELPH-MCNC: 2.3 GM/DL
GLUCOSE BLD-MCNC: 98 MG/DL (ref 70–110)
HCT VFR BLD AUTO: 37.1 % (ref 37–47)
HGB BLD-MCNC: 12.1 G/DL (ref 12–16)
IMM GRANULOCYTES # BLD: 0 10*3/MM3 (ref 0–0.03)
IMM GRANULOCYTES NFR BLD: 0 % (ref 0–0.5)
LYMPHOCYTES # BLD AUTO: 1.86 10*3/MM3 (ref 1–3)
LYMPHOCYTES NFR BLD AUTO: 41.1 % (ref 21–51)
MCH RBC QN AUTO: 28.7 PG (ref 27–33)
MCHC RBC AUTO-ENTMCNC: 32.6 G/DL (ref 33–37)
MCV RBC AUTO: 88.1 FL (ref 80–94)
MONOCYTES # BLD AUTO: 0.4 10*3/MM3 (ref 0.1–0.9)
MONOCYTES NFR BLD AUTO: 8.8 % (ref 0–10)
NEUTROPHILS # BLD AUTO: 2.17 10*3/MM3 (ref 1.4–6.5)
NEUTROPHILS NFR BLD AUTO: 47.9 % (ref 30–70)
OSMOLALITY SERPL CALC.SUM OF ELEC: 279.9 MOSM/KG (ref 273–305)
PLATELET # BLD AUTO: 143 10*3/MM3 (ref 130–400)
PMV BLD AUTO: 11.1 FL (ref 6–10)
POTASSIUM BLD-SCNC: 3.6 MMOL/L (ref 3.5–5.3)
PROT SERPL-MCNC: 6.4 G/DL (ref 6–8)
RBC # BLD AUTO: 4.21 10*6/MM3 (ref 4.2–5.4)
SODIUM BLD-SCNC: 141 MMOL/L (ref 135–153)
TROPONIN I SERPL-MCNC: <0.006 NG/ML
WBC NRBC COR # BLD: 4.53 10*3/MM3 (ref 4.5–12.5)

## 2018-02-26 PROCEDURE — 25010000002 HYDROMORPHONE PER 4 MG: Performed by: FAMILY MEDICINE

## 2018-02-26 PROCEDURE — 99285 EMERGENCY DEPT VISIT HI MDM: CPT

## 2018-02-26 PROCEDURE — 84484 ASSAY OF TROPONIN QUANT: CPT | Performed by: FAMILY MEDICINE

## 2018-02-26 PROCEDURE — 93005 ELECTROCARDIOGRAM TRACING: CPT | Performed by: FAMILY MEDICINE

## 2018-02-26 PROCEDURE — 80053 COMPREHEN METABOLIC PANEL: CPT | Performed by: FAMILY MEDICINE

## 2018-02-26 PROCEDURE — 0 IOPAMIDOL 61 % SOLUTION: Performed by: FAMILY MEDICINE

## 2018-02-26 PROCEDURE — 85025 COMPLETE CBC W/AUTO DIFF WBC: CPT | Performed by: FAMILY MEDICINE

## 2018-02-26 PROCEDURE — 96361 HYDRATE IV INFUSION ADD-ON: CPT

## 2018-02-26 PROCEDURE — 71275 CT ANGIOGRAPHY CHEST: CPT

## 2018-02-26 PROCEDURE — 71275 CT ANGIOGRAPHY CHEST: CPT | Performed by: RADIOLOGY

## 2018-02-26 PROCEDURE — 96376 TX/PRO/DX INJ SAME DRUG ADON: CPT

## 2018-02-26 PROCEDURE — 96374 THER/PROPH/DIAG INJ IV PUSH: CPT

## 2018-02-26 PROCEDURE — 83880 ASSAY OF NATRIURETIC PEPTIDE: CPT | Performed by: FAMILY MEDICINE

## 2018-02-26 RX ORDER — SODIUM CHLORIDE 0.9 % (FLUSH) 0.9 %
10 SYRINGE (ML) INJECTION AS NEEDED
Status: DISCONTINUED | OUTPATIENT
Start: 2018-02-26 | End: 2018-02-27 | Stop reason: HOSPADM

## 2018-02-26 RX ORDER — HYDROMORPHONE HCL 110MG/55ML
0.5 PATIENT CONTROLLED ANALGESIA SYRINGE INTRAVENOUS ONCE
Status: COMPLETED | OUTPATIENT
Start: 2018-02-26 | End: 2018-02-26

## 2018-02-26 RX ORDER — HYDROMORPHONE HCL 110MG/55ML
1 PATIENT CONTROLLED ANALGESIA SYRINGE INTRAVENOUS ONCE
Status: COMPLETED | OUTPATIENT
Start: 2018-02-26 | End: 2018-02-26

## 2018-02-26 RX ORDER — METHYLPREDNISOLONE SODIUM SUCCINATE 125 MG/2ML
125 INJECTION, POWDER, LYOPHILIZED, FOR SOLUTION INTRAMUSCULAR; INTRAVENOUS ONCE
Status: COMPLETED | OUTPATIENT
Start: 2018-02-26 | End: 2018-02-27

## 2018-02-26 RX ADMIN — IOPAMIDOL 100 ML: 612 INJECTION, SOLUTION INTRAVENOUS at 22:50

## 2018-02-26 RX ADMIN — HYDROMORPHONE HYDROCHLORIDE 0.5 MG: 2 INJECTION INTRAMUSCULAR; INTRAVENOUS; SUBCUTANEOUS at 23:16

## 2018-02-26 RX ADMIN — SODIUM CHLORIDE 1000 ML: 9 INJECTION, SOLUTION INTRAVENOUS at 21:22

## 2018-02-26 RX ADMIN — HYDROMORPHONE HYDROCHLORIDE 1 MG: 2 INJECTION INTRAMUSCULAR; INTRAVENOUS; SUBCUTANEOUS at 21:24

## 2018-02-27 VITALS
SYSTOLIC BLOOD PRESSURE: 144 MMHG | WEIGHT: 163 LBS | HEIGHT: 67 IN | TEMPERATURE: 98.3 F | DIASTOLIC BLOOD PRESSURE: 72 MMHG | BODY MASS INDEX: 25.58 KG/M2 | OXYGEN SATURATION: 98 % | HEART RATE: 64 BPM | RESPIRATION RATE: 16 BRPM

## 2018-02-27 LAB — TROPONIN I SERPL-MCNC: <0.006 NG/ML

## 2018-02-27 PROCEDURE — 96375 TX/PRO/DX INJ NEW DRUG ADDON: CPT

## 2018-02-27 PROCEDURE — 25010000002 METHYLPREDNISOLONE PER 125 MG: Performed by: FAMILY MEDICINE

## 2018-02-27 PROCEDURE — 25010000002 HEPARIN FLUSH (PORCINE) 100 UNIT/ML SOLUTION

## 2018-02-27 PROCEDURE — 84484 ASSAY OF TROPONIN QUANT: CPT | Performed by: FAMILY MEDICINE

## 2018-02-27 RX ADMIN — METHYLPREDNISOLONE SODIUM SUCCINATE 125 MG: 125 INJECTION, POWDER, FOR SOLUTION INTRAMUSCULAR; INTRAVENOUS at 00:11

## 2018-02-27 RX ADMIN — HEPARIN SODIUM (PORCINE) LOCK FLUSH IV SOLN 100 UNIT/ML 300 UNITS: 100 SOLUTION at 01:27

## 2018-03-02 ENCOUNTER — HOSPITAL ENCOUNTER (EMERGENCY)
Facility: HOSPITAL | Age: 43
Discharge: HOME | End: 2018-03-02
Attending: EMERGENCY MEDICINE | Admitting: EMERGENCY MEDICINE

## 2018-03-02 ENCOUNTER — APPOINTMENT (OUTPATIENT)
Dept: GENERAL RADIOLOGY | Facility: HOSPITAL | Age: 43
End: 2018-03-02

## 2018-03-02 VITALS
HEIGHT: 69 IN | SYSTOLIC BLOOD PRESSURE: 109 MMHG | TEMPERATURE: 98 F | RESPIRATION RATE: 18 BRPM | WEIGHT: 163 LBS | HEART RATE: 61 BPM | BODY MASS INDEX: 24.14 KG/M2 | OXYGEN SATURATION: 100 % | DIASTOLIC BLOOD PRESSURE: 71 MMHG

## 2018-03-02 DIAGNOSIS — R09.1 PLEURISY WITHOUT EFFUSION: ICD-10-CM

## 2018-03-02 DIAGNOSIS — J20.9 ACUTE BRONCHITIS, UNSPECIFIED ORGANISM: Primary | ICD-10-CM

## 2018-03-02 LAB
ALBUMIN SERPL-MCNC: 4.3 G/DL (ref 3.5–5)
ALBUMIN/GLOB SERPL: 1.9 G/DL (ref 1.5–2.5)
ALP SERPL-CCNC: 73 U/L (ref 35–104)
ALT SERPL W P-5'-P-CCNC: 49 U/L (ref 10–36)
ANION GAP SERPL CALCULATED.3IONS-SCNC: 6.2 MMOL/L (ref 3.6–11.2)
AST SERPL-CCNC: 46 U/L (ref 10–30)
BASOPHILS # BLD AUTO: 0.01 10*3/MM3 (ref 0–0.3)
BASOPHILS NFR BLD AUTO: 0.2 % (ref 0–2)
BILIRUB SERPL-MCNC: 1 MG/DL (ref 0.2–1.8)
BUN BLD-MCNC: 8 MG/DL (ref 7–21)
BUN/CREAT SERPL: 14.8 (ref 7–25)
CALCIUM SPEC-SCNC: 9.2 MG/DL (ref 7.7–10)
CHLORIDE SERPL-SCNC: 106 MMOL/L (ref 99–112)
CK MB SERPL-CCNC: <0.18 NG/ML (ref 0–5)
CK MB SERPL-RTO: NORMAL % (ref 0–3)
CK SERPL-CCNC: 27 U/L (ref 24–173)
CO2 SERPL-SCNC: 27.8 MMOL/L (ref 24.3–31.9)
CREAT BLD-MCNC: 0.54 MG/DL (ref 0.43–1.29)
DEPRECATED RDW RBC AUTO: 43.5 FL (ref 37–54)
EOSINOPHIL # BLD AUTO: 0.09 10*3/MM3 (ref 0–0.7)
EOSINOPHIL NFR BLD AUTO: 2.1 % (ref 0–5)
ERYTHROCYTE [DISTWIDTH] IN BLOOD BY AUTOMATED COUNT: 14 % (ref 11.5–14.5)
GFR SERPL CREATININE-BSD FRML MDRD: 123 ML/MIN/1.73
GLOBULIN UR ELPH-MCNC: 2.3 GM/DL
GLUCOSE BLD-MCNC: 84 MG/DL (ref 70–110)
HCT VFR BLD AUTO: 37.3 % (ref 37–47)
HGB BLD-MCNC: 12.3 G/DL (ref 12–16)
HOLD SPECIMEN: NORMAL
HOLD SPECIMEN: NORMAL
IMM GRANULOCYTES # BLD: 0 10*3/MM3 (ref 0–0.03)
IMM GRANULOCYTES NFR BLD: 0 % (ref 0–0.5)
LYMPHOCYTES # BLD AUTO: 1.78 10*3/MM3 (ref 1–3)
LYMPHOCYTES NFR BLD AUTO: 40.7 % (ref 21–51)
MCH RBC QN AUTO: 28.5 PG (ref 27–33)
MCHC RBC AUTO-ENTMCNC: 33 G/DL (ref 33–37)
MCV RBC AUTO: 86.5 FL (ref 80–94)
MONOCYTES # BLD AUTO: 0.44 10*3/MM3 (ref 0.1–0.9)
MONOCYTES NFR BLD AUTO: 10.1 % (ref 0–10)
NEUTROPHILS # BLD AUTO: 2.05 10*3/MM3 (ref 1.4–6.5)
NEUTROPHILS NFR BLD AUTO: 46.9 % (ref 30–70)
OSMOLALITY SERPL CALC.SUM OF ELEC: 276.9 MOSM/KG (ref 273–305)
PLATELET # BLD AUTO: 154 10*3/MM3 (ref 130–400)
PMV BLD AUTO: 10.7 FL (ref 6–10)
POTASSIUM BLD-SCNC: 3.5 MMOL/L (ref 3.5–5.3)
PROT SERPL-MCNC: 6.6 G/DL (ref 6–8)
RBC # BLD AUTO: 4.31 10*6/MM3 (ref 4.2–5.4)
SODIUM BLD-SCNC: 140 MMOL/L (ref 135–153)
TROPONIN I SERPL-MCNC: <0.006 NG/ML
WBC NRBC COR # BLD: 4.37 10*3/MM3 (ref 4.5–12.5)
WHOLE BLOOD HOLD SPECIMEN: NORMAL
WHOLE BLOOD HOLD SPECIMEN: NORMAL

## 2018-03-02 PROCEDURE — 71045 X-RAY EXAM CHEST 1 VIEW: CPT

## 2018-03-02 PROCEDURE — 25010000002 BUTORPHANOL PER 1 MG: Performed by: EMERGENCY MEDICINE

## 2018-03-02 PROCEDURE — 25010000002 METHYLPREDNISOLONE PER 125 MG: Performed by: EMERGENCY MEDICINE

## 2018-03-02 PROCEDURE — 85025 COMPLETE CBC W/AUTO DIFF WBC: CPT | Performed by: EMERGENCY MEDICINE

## 2018-03-02 PROCEDURE — 84484 ASSAY OF TROPONIN QUANT: CPT | Performed by: EMERGENCY MEDICINE

## 2018-03-02 PROCEDURE — 82553 CREATINE MB FRACTION: CPT | Performed by: EMERGENCY MEDICINE

## 2018-03-02 PROCEDURE — 82550 ASSAY OF CK (CPK): CPT | Performed by: EMERGENCY MEDICINE

## 2018-03-02 PROCEDURE — 96375 TX/PRO/DX INJ NEW DRUG ADDON: CPT

## 2018-03-02 PROCEDURE — 93005 ELECTROCARDIOGRAM TRACING: CPT | Performed by: EMERGENCY MEDICINE

## 2018-03-02 PROCEDURE — 99285 EMERGENCY DEPT VISIT HI MDM: CPT

## 2018-03-02 PROCEDURE — 25010000002 HEPARIN FLUSH (PORCINE) 100 UNIT/ML SOLUTION: Performed by: EMERGENCY MEDICINE

## 2018-03-02 PROCEDURE — 71045 X-RAY EXAM CHEST 1 VIEW: CPT | Performed by: RADIOLOGY

## 2018-03-02 PROCEDURE — 96374 THER/PROPH/DIAG INJ IV PUSH: CPT

## 2018-03-02 PROCEDURE — 80053 COMPREHEN METABOLIC PANEL: CPT | Performed by: EMERGENCY MEDICINE

## 2018-03-02 PROCEDURE — 25010000002 PROMETHAZINE PER 50 MG: Performed by: EMERGENCY MEDICINE

## 2018-03-02 PROCEDURE — 96361 HYDRATE IV INFUSION ADD-ON: CPT

## 2018-03-02 RX ORDER — GUAIFENESIN AND DEXTROMETHORPHAN HYDROBROMIDE 600; 30 MG/1; MG/1
1-2 TABLET, EXTENDED RELEASE ORAL 2 TIMES DAILY PRN
Qty: 40 TABLET | Refills: 0 | Status: SHIPPED | OUTPATIENT
Start: 2018-03-02 | End: 2018-03-22 | Stop reason: HOSPADM

## 2018-03-02 RX ORDER — METHYLPREDNISOLONE 4 MG/1
TABLET ORAL
Qty: 21 EACH | Refills: 0 | Status: SHIPPED | OUTPATIENT
Start: 2018-03-02 | End: 2018-03-22 | Stop reason: HOSPADM

## 2018-03-02 RX ORDER — SODIUM CHLORIDE 0.9 % (FLUSH) 0.9 %
10 SYRINGE (ML) INJECTION AS NEEDED
Status: DISCONTINUED | OUTPATIENT
Start: 2018-03-02 | End: 2018-03-02 | Stop reason: HOSPADM

## 2018-03-02 RX ORDER — METHYLPREDNISOLONE SODIUM SUCCINATE 125 MG/2ML
125 INJECTION, POWDER, LYOPHILIZED, FOR SOLUTION INTRAMUSCULAR; INTRAVENOUS ONCE
Status: COMPLETED | OUTPATIENT
Start: 2018-03-02 | End: 2018-03-02

## 2018-03-02 RX ORDER — SODIUM CHLORIDE, SODIUM LACTATE, POTASSIUM CHLORIDE, CALCIUM CHLORIDE 600; 310; 30; 20 MG/100ML; MG/100ML; MG/100ML; MG/100ML
200 INJECTION, SOLUTION INTRAVENOUS CONTINUOUS
Status: DISCONTINUED | OUTPATIENT
Start: 2018-03-02 | End: 2018-03-02 | Stop reason: HOSPADM

## 2018-03-02 RX ORDER — DIFLUNISAL 500 MG/1
500 TABLET, FILM COATED ORAL 2 TIMES DAILY
Qty: 20 TABLET | Refills: 0 | Status: SHIPPED | OUTPATIENT
Start: 2018-03-02 | End: 2018-03-22 | Stop reason: HOSPADM

## 2018-03-02 RX ORDER — AZITHROMYCIN 250 MG/1
TABLET, FILM COATED ORAL
Qty: 6 TABLET | Refills: 0 | Status: SHIPPED | OUTPATIENT
Start: 2018-03-02 | End: 2018-03-22 | Stop reason: HOSPADM

## 2018-03-02 RX ADMIN — HEPARIN SODIUM (PORCINE) LOCK FLUSH IV SOLN 100 UNIT/ML 300 UNITS: 100 SOLUTION at 02:59

## 2018-03-02 RX ADMIN — METHYLPREDNISOLONE SODIUM SUCCINATE 125 MG: 125 INJECTION, POWDER, FOR SOLUTION INTRAMUSCULAR; INTRAVENOUS at 01:50

## 2018-03-02 RX ADMIN — SODIUM CHLORIDE, POTASSIUM CHLORIDE, SODIUM LACTATE AND CALCIUM CHLORIDE 200 ML/HR: 600; 310; 30; 20 INJECTION, SOLUTION INTRAVENOUS at 01:58

## 2018-03-02 RX ADMIN — PROMETHAZINE HYDROCHLORIDE 12.5 MG: 25 INJECTION INTRAMUSCULAR; INTRAVENOUS at 01:55

## 2018-03-02 RX ADMIN — BUTORPHANOL TARTRATE 1 MG: 2 INJECTION, SOLUTION INTRAMUSCULAR; INTRAVENOUS at 01:52

## 2018-03-02 NOTE — DISCHARGE INSTRUCTIONS
Acute Bronchitis, Adult  Acute bronchitis is when air tubes (bronchi) in the lungs suddenly get swollen. The condition can make it hard to breathe. It can also cause these symptoms:  · A cough.  · Coughing up clear, yellow, or green mucus.  · Wheezing.  · Chest congestion.  · Shortness of breath.  · A fever.  · Body aches.  · Chills.  · A sore throat.  Follow these instructions at home:  Medicines   · Take over-the-counter and prescription medicines only as told by your doctor.  · If you were prescribed an antibiotic medicine, take it as told by your doctor. Do not stop taking the antibiotic even if you start to feel better.  General instructions   · Rest.  · Drink enough fluids to keep your pee (urine) clear or pale yellow.  · Avoid smoking and secondhand smoke. If you smoke and you need help quitting, ask your doctor. Quitting will help your lungs heal faster.  · Use an inhaler, cool mist vaporizer, or humidifier as told by your doctor.  · Keep all follow-up visits as told by your doctor. This is important.  How is this prevented?  To lower your risk of getting this condition again:  · Wash your hands often with soap and water. If you cannot use soap and water, use hand .  · Avoid contact with people who have cold symptoms.  · Try not to touch your hands to your mouth, nose, or eyes.  · Make sure to get the flu shot every year.  Contact a doctor if:  · Your symptoms do not get better in 2 weeks.  Get help right away if:  · You cough up blood.  · You have chest pain.  · You have very bad shortness of breath.  · You become dehydrated.  · You faint (pass out) or keep feeling like you are going to pass out.  · You keep throwing up (vomiting).  · You have a very bad headache.  · Your fever or chills gets worse.  This information is not intended to replace advice given to you by your health care provider. Make sure you discuss any questions you have with your health care provider.  Document Released: 06/05/2009  Document Revised: 07/26/2017 Document Reviewed: 06/07/2017  [employer] Interactive Patient Education © 2017 [employer]      Pleurisy  Pleurisy is irritation and swelling (inflammation) of the linings of your lungs (pleura). This can cause pain in your chest, back, or shoulder. It can also cause trouble breathing.  Follow these instructions at home:  Medicines   · Take over-the-counter and prescription medicines only as told by your doctor.  · If you were prescribed antibiotic medicine, take it as told by your doctor. Do not stop taking the antibiotic even if you start to feel better.  Activity   · Rest and return to your normal activities as told by your doctor. Ask your doctor what activities are safe for you.  · Do not drive or use heavy machinery while taking prescription pain medicine.  General instructions   · Watch for any changes in your condition.  · Take deep breaths often, even if it is painful. This can help prevent lung problems.  · When lying down, lie on your painful side. This may help you feel less pain.  · Do not smoke. If you need help quitting, ask your doctor.  · Keep all follow-up visits as told by your doctor. This is important.  Contact a doctor if:  · You have pain that:  ¨ Gets worse.  ¨ Does not get better with medicine.  ¨ Lasts for more than 1 week.  · You have a fever or chills.  · You have a cough that does not get better at home.  · You have trouble breathing that does not get better at home.  · You cough up liquid that looks like pus (purulent secretions).  Get help right away if:  · Your lips, fingernails, or toenails turn dark or turn blue.  · You cough up blood.  · You have trouble breathing that gets worse.  · You are making loud noises when you breathe (wheezing) and this gets worse.  · You have pain that spreads to your neck, arms, or jaw.  · You get a rash.  · You throw up (vomit).  · You pass out (faint).  Summary  · Pleurisy is irritation and swelling (inflammation) of the  linings of your lungs (pleura).  · Pleurisy can cause pain and trouble breathing.  · If you have a cough that does not get better at home, contact your doctor.  · Get help right away if you are having trouble breathing and it is getting worse.  This information is not intended to replace advice given to you by your health care provider. Make sure you discuss any questions you have with your health care provider.  Document Released: 11/30/2009 Document Revised: 09/11/2017 Document Reviewed: 09/11/2017  [employer] Interactive Patient Education © 2017 [employer]

## 2018-03-02 NOTE — ED NOTES
Pt states she was seen in ER recently, states she was diagnosed with pleurisy. States she has continued to have left sided chest pain and cough     Fatimah Lucas RN  03/02/18 3129

## 2018-03-02 NOTE — ED PROVIDER NOTES
Subjective   HPI Comments: Pt comes in with green productive cough.  Has had for a couple of days.  Hurts to breathe and cough. Seen 2 days ago, not improved.  Recently identified 5mm pulmonary nodule.  Has follow up with Venkata who is aware.  Office appointment pending 3-.  Awaiting outpatient stress test.    Patient is a 43 y.o. female presenting with chest pain.   History provided by:  Patient, spouse and medical records  Chest Pain   Pain location:  L chest  Pain quality: sharp and stabbing    Pain radiates to:  Does not radiate  Pain severity:  Severe  Onset quality:  Sudden  Progression:  Worsening  Chronicity:  Recurrent  Context: breathing    Context: not drug use, not eating, not intercourse, not lifting, not movement, not raising an arm, not at rest, not stress and not trauma    Relieved by:  Nothing  Worsened by:  Coughing and deep breathing  Ineffective treatments:  None tried  Associated symptoms: anxiety, cough and nausea    Associated symptoms: no abdominal pain, no AICD problem, no altered mental status, no anorexia, no back pain, no claudication, no diaphoresis, no dizziness, no dysphagia, no fatigue, no fever, no headache, no heartburn, no lower extremity edema, no near-syncope, no numbness, no orthopnea, no palpitations, no PND, no shortness of breath, no syncope, no vomiting and no weakness    Risk factors: no aortic disease, no birth control, no diabetes mellitus, no Jian-Danlos syndrome, no immobilization, not male, no Marfan's syndrome, not obese, not pregnant, no prior DVT/PE and no surgery        Review of Systems   Constitutional: Negative.  Negative for diaphoresis, fatigue and fever.   HENT: Negative.  Negative for trouble swallowing.    Eyes: Negative.    Respiratory: Positive for cough and chest tightness. Negative for shortness of breath and wheezing.    Cardiovascular: Positive for chest pain. Negative for palpitations, orthopnea, claudication, syncope, PND and near-syncope.    Gastrointestinal: Positive for nausea. Negative for abdominal pain, anorexia, heartburn and vomiting.   Endocrine: Negative.    Genitourinary: Negative.    Musculoskeletal: Negative.  Negative for back pain.   Skin: Negative.    Allergic/Immunologic: Negative.    Neurological: Negative.  Negative for dizziness, weakness, numbness and headaches.   Hematological: Negative.    Psychiatric/Behavioral: Negative.    All other systems reviewed and are negative.      Past Medical History:   Diagnosis Date   • Abdominal pain    • Abdominal swelling    • Hoyos's disease    • Bipolar 1 disorder    • Brain tumor     R Frontal Lobe per pt   • Brain tumor 2014   • Brain tumor (benign)    • Constipation    • DDD (degenerative disc disease), cervical 05/29/2017   • DDD (degenerative disc disease), cervical    • Diarrhea    • Fibromyalgia    • IBS (irritable bowel syndrome)    • Migraine    • Nausea & vomiting    • PONV (postoperative nausea and vomiting)    • PTSD (post-traumatic stress disorder)    • Rectal bleeding        Allergies   Allergen Reactions   • Ativan [Lorazepam] Hallucinations     confusion   • Sulfa Antibiotics Shortness Of Breath and Swelling   • Compazine [Prochlorperazine Edisylate] Hives   • Demerol [Meperidine] Hives   • Droperidol Itching   • Metoclopramide Swelling   • Toradol [Ketorolac Tromethamine] Hives and Itching   • Zofran [Ondansetron Hcl] Rash       Past Surgical History:   Procedure Laterality Date   • ANAL SCOPE N/A 7/28/2016    Procedure: ANAL SCOPE;  Surgeon: Kael Lopez MD;  Location: [facility];  Service:    • APPENDECTOMY     • COLONOSCOPY N/A 6/30/2016    Procedure: COLONOSCOPY  CPTCODE:28628;  Surgeon: Jose Antonio Belle III, MD;  Location: [facility] OR;  Service:    • COLONOSCOPY N/A 7/7/2016    Procedure: COLONOSCOPY (60878) CPT;  Surgeon: Jose Antonio Belle III, MD;  Location: [facility] OR;  Service:    • CYSTOSCOPY RETROGRADE PYELOGRAM Bilateral 4/28/2017    Procedure: CYSTOSCOPY  RETROGRADE PYELOGRAM;  Surgeon: Mu Blunt MD;  Location: [facility] OR;  Service:    • ENDOSCOPY N/A 6/30/2016    Procedure: ESOPHAGOGASTRODUODENOSCOPY WITH BIOPSY  CPTCODE:24677;  Surgeon: Jose Antonio Belle III, MD;  Location: [facility] OR;  Service:    • HEMORRHOIDECTOMY N/A 7/28/2016    Procedure: HEMORRHOID STAPLING;  Surgeon: Kael Lopez MD;  Location: [facility] OR;  Service:    • HYSTERECTOMY     • KNEE SURGERY     • LAPAROSCOPIC SALPINGOOPHERECTOMY     • PORTACATH PLACEMENT N/A 7/28/2017    Procedure: INSERTION OF PORTACATH;  Surgeon: Celso Arredondo MD;  Location: [facility] OR;  Service:    • SHOULDER SURGERY      3 times       Family History   Problem Relation Age of Onset   • Crohn's disease Other    • Hypertension Other    • Diabetes Other    • Irritable bowel syndrome Other        Social History     Social History   • Marital status:      Social History Main Topics   • Smoking status: Former Smoker     Packs/day: 1.00     Years: 20.00     Quit date: 11/1/2015   • Smokeless tobacco: Never Used   • Alcohol use No   • Drug use: Yes     Special: Marijuana      Comment: for pain   • Sexual activity: Defer           Objective   Physical Exam   Constitutional: She is oriented to person, place, and time. She appears well-developed and well-nourished. No distress.   HENT:   Head: Normocephalic and atraumatic.   Right Ear: External ear normal.   Left Ear: External ear normal.   Mouth/Throat: Oropharynx is clear and moist.   Eyes: Conjunctivae and EOM are normal. Right eye exhibits no discharge. Left eye exhibits no discharge. No scleral icterus.   Neck: Normal range of motion. No JVD present. No tracheal deviation present.   Cardiovascular: Normal rate, regular rhythm, normal heart sounds and intact distal pulses.  Exam reveals no gallop and no friction rub.    No murmur heard.  Pulmonary/Chest: Effort normal. No stridor. No respiratory distress. She has no wheezes. She has no rales.   Pleuritic  pain with breathing  Non-productive cough   Abdominal: Bowel sounds are normal. She exhibits no distension and no mass. There is no tenderness. There is no guarding.   Genitourinary:   Genitourinary Comments: No CVAT   Musculoskeletal: Normal range of motion. She exhibits no edema, tenderness or deformity.   Lymphadenopathy:     She has no cervical adenopathy.   Neurological: She is alert and oriented to person, place, and time. She exhibits normal muscle tone. Coordination normal.   Skin: Skin is warm and dry. No pallor.   Psychiatric: She has a normal mood and affect. Her behavior is normal. Judgment and thought content normal.   Nursing note and vitals reviewed.      Procedures         ED Course  ED Course   Value Comment By Time   ECG 12 Lead 12-lead EKG performed at 0 46 hours.  Normal sinus rhythm.  Normal EKG.  Ventricular rate 87.  Panel 118.  QRS duration 70.  .  QTc 466.  No pathologic blocks.  No sustained ectopy or dysrhythmia.  No acute ischemic ST segment deviation.  No pathologic T-wave changes.  No criteria for STEMI. Sathish Xavier MD 03/02 0131     XR Chest 1 View   ED Interpretation   Single portable AP chest   My read   No apparent acute focal infiltrate or injury.  No evidence of   pneumothorax.        Labs Reviewed   COMPREHENSIVE METABOLIC PANEL - Abnormal; Notable for the following:        Result Value    ALT (SGPT) 49 (*)     AST (SGOT) 46 (*)     All other components within normal limits   CBC WITH AUTO DIFFERENTIAL - Abnormal; Notable for the following:     WBC 4.37 (*)     MPV 10.7 (*)     Monocyte % 10.1 (*)     All other components within normal limits   TROPONIN (IN-HOUSE) - Normal    Narrative:     Ultra Troponin I Reference Range:         <=0.039 ng/mL: Negative    0.04-0.779 ng/mL: Indeterminate Range. Suspicious of MI.  Clinical correlation required.       >=0.78  ng/mL: Consistent with myocardial injury.  Clinical correlation required.   CK - Normal   CK MB - Normal    OSMOLALITY, CALCULATED - Normal   CKMB INDEX CALCULATION   RAINBOW DRAW    Narrative:     The following orders were created for panel order [facility] Draw.  Procedure                               Abnormality         Status                     ---------                               -----------         ------                     Light Blue Top[[id number]]                                   In process                 Green Top (Gel)[[id number]]                                  In process                 Lavender Top[[id number]]                                     In process                 Gold Top - SST[[id number]]                                   In process                   Please view results for these tests on the individual orders.   CBC AND DIFFERENTIAL    Narrative:     The following orders were created for panel order CBC & Differential.  Procedure                               Abnormality         Status                     ---------                               -----------         ------                     CBC Auto Differential[[id number]]        Abnormal            Final result                 Please view results for these tests on the individual orders.   LIGHT BLUE TOP   GREEN TOP   LAVENDER TOP   GOLD TOP - SST        Medication List      START taking these medications          azithromycin 250 MG tablet   Commonly known as:  ZITHROMAX   Take 2 tablets the first day, then 1 tablet daily for 4 days.       diflunisal 500 MG tablet tablet   Commonly known as:  DOLOBID   Take 1 tablet by mouth 2 (Two) Times a Day.       guaifenesin-dextromethorphan  MG tablet sustained-release 12 hour   tablet   Take 1-2 tablets by mouth 2 (Two) Times a Day As Needed   (cough/congestion).       MethylPREDNISolone 4 MG tablet   Commonly known as:  MEDROL (BRET)   Take as directed on package instructions.         CONTINUE taking these medications          azelastine 0.15 % solution nasal spray   Commonly known as:  ASTEPRO    2 sprays into each nostril 2 (Two) Times a Day.       benzonatate 200 MG capsule   Commonly known as:  TESSALON   Take 1 capsule by mouth 3 (Three) Times a Day As Needed for Cough.       busPIRone 15 MG tablet   Commonly known as:  BUSPAR       butorphanol 10 MG/ML nasal spray   Commonly known as:  STADOL       carbidopa-levodopa  MG per tablet   Commonly known as:  SINEMET       cetirizine 10 MG tablet   Commonly known as:  zyrTEC   Take 1 tablet by mouth Daily.       dicyclomine 20 MG tablet   Commonly known as:  BENTYL   Take 1 tablet by mouth Every 6 (Six) Hours As Needed (Abdominal Cramping).         divalproex 500 MG DR tablet   Commonly known as:  DEPAKOTE       famotidine 40 MG tablet   Commonly known as:  PEPCID       guaiFENesin 600 MG 12 hr tablet   Commonly known as:  MUCINEX   Take 2 tablets by mouth 2 (Two) Times a Day.       HYDROcodone-acetaminophen 7.5-325 MG per tablet   Commonly known as:  NORCO       IMITREX 6 MG/0.5ML solution injection   Generic drug:  SUMAtriptan       oxybutynin 5 MG tablet   Commonly known as:  DITROPAN   Take 1 tablet by mouth 3 (Three) Times a Day.       * oxyCODONE-acetaminophen  MG per tablet   Commonly known as:  PERCOCET   Take 1 tablet by mouth Every 6 (Six) Hours As Needed for Moderate Pain .       * oxyCODONE-acetaminophen 5-325 MG per tablet   Commonly known as:  PERCOCET   1 to 2 Tablets Every 6 Hours as needed for PAIN       * oxyCODONE-acetaminophen 5-325 MG per tablet   Commonly known as:  PERCOCET   1 to 2 Tablets Every 6 Hours as needed for PAIN       pantoprazole 40 MG EC tablet   Commonly known as:  PROTONIX       polyethylene glycol 236 g solution   Commonly known as:  GoLYTELY   Starting at 6 pm on day prior to procedure, drink 8 ounces every 15   minutes until all gone or stools are clear. May add flavor packet.       potassium chloride 10 MEQ CR tablet   Commonly known as:  K-DUR   Take 1 tablet by mouth Daily.       promethazine 25 MG  tablet   Commonly known as:  PHENERGAN   Take 1 tablet by mouth Every 6 (Six) Hours As Needed for Nausea or   Vomiting.       sertraline 100 MG tablet   Commonly known as:  ZOLOFT       * Notice:  This list has 3 medication(s) that are the same as other   medications prescribed for you. Read the directions carefully, and ask   your doctor or other care provider to review them with you.              MDM  Number of Diagnoses or Management Options  Acute bronchitis, unspecified organism: established and worsening  Pleurisy without effusion: established and worsening     Amount and/or Complexity of Data Reviewed  Clinical lab tests: ordered and reviewed  Tests in the radiology section of CPT®: ordered and reviewed  Independent visualization of images, tracings, or specimens: yes    Risk of Complications, Morbidity, and/or Mortality  Presenting problems: moderate  Diagnostic procedures: moderate  Management options: moderate    Patient Progress  Patient progress: stable      Final diagnoses:   Acute bronchitis, unspecified organism   Pleurisy without effusion            Sathish Xavier MD  03/02/18 0244

## 2018-03-14 ENCOUNTER — APPOINTMENT (OUTPATIENT)
Dept: CT IMAGING | Facility: HOSPITAL | Age: 43
End: 2018-03-14

## 2018-03-14 ENCOUNTER — HOSPITAL ENCOUNTER (EMERGENCY)
Facility: HOSPITAL | Age: 43
Discharge: HOME | End: 2018-03-14
Attending: EMERGENCY MEDICINE | Admitting: EMERGENCY MEDICINE

## 2018-03-14 ENCOUNTER — HOSPITAL ENCOUNTER (OUTPATIENT)
Facility: HOSPITAL | Age: 43
Setting detail: OBSERVATION
End: 2018-03-14
Attending: NEUROLOGICAL SURGERY | Admitting: NEUROLOGICAL SURGERY

## 2018-03-14 DIAGNOSIS — IMO0002 CHRONIC MIGRAINE: Primary | ICD-10-CM

## 2018-03-14 LAB
ALBUMIN SERPL-MCNC: 3.9 G/DL (ref 3.5–5)
ALBUMIN/GLOB SERPL: 1.5 G/DL (ref 1.5–2.5)
ALP SERPL-CCNC: 63 U/L (ref 35–104)
ALT SERPL W P-5'-P-CCNC: 54 U/L (ref 10–36)
ANION GAP SERPL CALCULATED.3IONS-SCNC: 5.6 MMOL/L (ref 3.6–11.2)
AST SERPL-CCNC: 37 U/L (ref 10–30)
B-HCG UR QL: NEGATIVE
BACTERIA UR QL AUTO: NORMAL /HPF
BASOPHILS # BLD AUTO: 0 10*3/MM3 (ref 0–0.3)
BASOPHILS NFR BLD AUTO: 0 % (ref 0–2)
BILIRUB SERPL-MCNC: 0.6 MG/DL (ref 0.2–1.8)
BILIRUB UR QL STRIP: NEGATIVE
BUN BLD-MCNC: 8 MG/DL (ref 7–21)
BUN/CREAT SERPL: 16.7 (ref 7–25)
CALCIUM SPEC-SCNC: 9.2 MG/DL (ref 7.7–10)
CHLORIDE SERPL-SCNC: 110 MMOL/L (ref 99–112)
CLARITY UR: CLEAR
CO2 SERPL-SCNC: 25.4 MMOL/L (ref 24.3–31.9)
COLOR UR: YELLOW
CREAT BLD-MCNC: 0.48 MG/DL (ref 0.43–1.29)
CRP SERPL-MCNC: <0.5 MG/DL (ref 0–0.99)
D-LACTATE SERPL-SCNC: 0.7 MMOL/L (ref 0.5–2)
DEPRECATED RDW RBC AUTO: 42 FL (ref 37–54)
EOSINOPHIL # BLD AUTO: 0.04 10*3/MM3 (ref 0–0.7)
EOSINOPHIL NFR BLD AUTO: 0.5 % (ref 0–5)
ERYTHROCYTE [DISTWIDTH] IN BLOOD BY AUTOMATED COUNT: 13.6 % (ref 11.5–14.5)
GFR SERPL CREATININE-BSD FRML MDRD: 141 ML/MIN/1.73
GLOBULIN UR ELPH-MCNC: 2.6 GM/DL
GLUCOSE BLD-MCNC: 95 MG/DL (ref 70–110)
GLUCOSE UR STRIP-MCNC: NEGATIVE MG/DL
HCT VFR BLD AUTO: 36.2 % (ref 37–47)
HGB BLD-MCNC: 12 G/DL (ref 12–16)
HGB UR QL STRIP.AUTO: NEGATIVE
HOLD SPECIMEN: NORMAL
HOLD SPECIMEN: NORMAL
HYALINE CASTS UR QL AUTO: NORMAL /LPF
IMM GRANULOCYTES # BLD: 0.01 10*3/MM3 (ref 0–0.03)
IMM GRANULOCYTES NFR BLD: 0.1 % (ref 0–0.5)
KETONES UR QL STRIP: NEGATIVE
LEUKOCYTE ESTERASE UR QL STRIP.AUTO: ABNORMAL
LYMPHOCYTES # BLD AUTO: 1.76 10*3/MM3 (ref 1–3)
LYMPHOCYTES NFR BLD AUTO: 21.5 % (ref 21–51)
MCH RBC QN AUTO: 28.5 PG (ref 27–33)
MCHC RBC AUTO-ENTMCNC: 33.1 G/DL (ref 33–37)
MCV RBC AUTO: 86 FL (ref 80–94)
MONOCYTES # BLD AUTO: 0.59 10*3/MM3 (ref 0.1–0.9)
MONOCYTES NFR BLD AUTO: 7.2 % (ref 0–10)
NEUTROPHILS # BLD AUTO: 5.77 10*3/MM3 (ref 1.4–6.5)
NEUTROPHILS NFR BLD AUTO: 70.7 % (ref 30–70)
NITRITE UR QL STRIP: NEGATIVE
OSMOLALITY SERPL CALC.SUM OF ELEC: 279.4 MOSM/KG (ref 273–305)
PH UR STRIP.AUTO: 7 [PH] (ref 5–8)
PLATELET # BLD AUTO: 144 10*3/MM3 (ref 130–400)
PMV BLD AUTO: 11.4 FL (ref 6–10)
POTASSIUM BLD-SCNC: 3.6 MMOL/L (ref 3.5–5.3)
PROT SERPL-MCNC: 6.5 G/DL (ref 6–8)
PROT UR QL STRIP: NEGATIVE
RBC # BLD AUTO: 4.21 10*6/MM3 (ref 4.2–5.4)
RBC # UR: NORMAL /HPF
REF LAB TEST METHOD: NORMAL
SODIUM BLD-SCNC: 141 MMOL/L (ref 135–153)
SP GR UR STRIP: <=1.005 (ref 1–1.03)
SQUAMOUS #/AREA URNS HPF: NORMAL /HPF
UROBILINOGEN UR QL STRIP: ABNORMAL
WBC NRBC COR # BLD: 8.17 10*3/MM3 (ref 4.5–12.5)
WBC UR QL AUTO: NORMAL /HPF
WHOLE BLOOD HOLD SPECIMEN: NORMAL
WHOLE BLOOD HOLD SPECIMEN: NORMAL

## 2018-03-14 PROCEDURE — 80053 COMPREHEN METABOLIC PANEL: CPT | Performed by: EMERGENCY MEDICINE

## 2018-03-14 PROCEDURE — 81001 URINALYSIS AUTO W/SCOPE: CPT | Performed by: EMERGENCY MEDICINE

## 2018-03-14 PROCEDURE — 87040 BLOOD CULTURE FOR BACTERIA: CPT | Performed by: EMERGENCY MEDICINE

## 2018-03-14 PROCEDURE — 25010000002 HEPARIN FLUSH (PORCINE) 100 UNIT/ML SOLUTION

## 2018-03-14 PROCEDURE — 36415 COLL VENOUS BLD VENIPUNCTURE: CPT

## 2018-03-14 PROCEDURE — 96375 TX/PRO/DX INJ NEW DRUG ADDON: CPT

## 2018-03-14 PROCEDURE — 96361 HYDRATE IV INFUSION ADD-ON: CPT

## 2018-03-14 PROCEDURE — 70450 CT HEAD/BRAIN W/O DYE: CPT

## 2018-03-14 PROCEDURE — 85025 COMPLETE CBC W/AUTO DIFF WBC: CPT | Performed by: EMERGENCY MEDICINE

## 2018-03-14 PROCEDURE — 96374 THER/PROPH/DIAG INJ IV PUSH: CPT

## 2018-03-14 PROCEDURE — 80164 ASSAY DIPROPYLACETIC ACD TOT: CPT | Performed by: EMERGENCY MEDICINE

## 2018-03-14 PROCEDURE — 81025 URINE PREGNANCY TEST: CPT | Performed by: EMERGENCY MEDICINE

## 2018-03-14 PROCEDURE — 83605 ASSAY OF LACTIC ACID: CPT | Performed by: EMERGENCY MEDICINE

## 2018-03-14 PROCEDURE — 86140 C-REACTIVE PROTEIN: CPT | Performed by: EMERGENCY MEDICINE

## 2018-03-14 PROCEDURE — 70450 CT HEAD/BRAIN W/O DYE: CPT | Performed by: RADIOLOGY

## 2018-03-14 PROCEDURE — 25010000002 PROMETHAZINE PER 50 MG: Performed by: EMERGENCY MEDICINE

## 2018-03-14 PROCEDURE — 96376 TX/PRO/DX INJ SAME DRUG ADON: CPT

## 2018-03-14 PROCEDURE — 25010000002 BUTORPHANOL PER 1 MG: Performed by: EMERGENCY MEDICINE

## 2018-03-14 PROCEDURE — 99283 EMERGENCY DEPT VISIT LOW MDM: CPT

## 2018-03-14 RX ORDER — SODIUM CHLORIDE 0.9 % (FLUSH) 0.9 %
10 SYRINGE (ML) INJECTION AS NEEDED
Status: DISCONTINUED | OUTPATIENT
Start: 2018-03-14 | End: 2018-03-15 | Stop reason: HOSPADM

## 2018-03-14 RX ORDER — BUTORPHANOL TARTRATE 1 MG/ML
1 INJECTION, SOLUTION INTRAMUSCULAR; INTRAVENOUS ONCE
Status: COMPLETED | OUTPATIENT
Start: 2018-03-14 | End: 2018-03-14

## 2018-03-14 RX ADMIN — BUTORPHANOL TARTRATE 1 MG: 1 INJECTION, SOLUTION INTRAMUSCULAR; INTRAVENOUS at 22:33

## 2018-03-14 RX ADMIN — HEPARIN SODIUM (PORCINE) LOCK FLUSH IV SOLN 100 UNIT/ML 300 UNITS: 100 SOLUTION at 23:36

## 2018-03-14 RX ADMIN — SODIUM CHLORIDE 1000 ML: 9 INJECTION, SOLUTION INTRAVENOUS at 21:34

## 2018-03-14 RX ADMIN — BUTORPHANOL TARTRATE 2 MG: 2 INJECTION, SOLUTION INTRAMUSCULAR; INTRAVENOUS at 20:28

## 2018-03-14 RX ADMIN — PROMETHAZINE HYDROCHLORIDE 12.5 MG: 25 INJECTION INTRAMUSCULAR; INTRAVENOUS at 20:26

## 2018-03-14 NOTE — ED NOTES
Pt states she is having a migraine that feels different than her normal. Hurts worse when her head is raised up or standing up.     Gene Lopez RN  03/14/18 1938

## 2018-03-15 ENCOUNTER — HOSPITAL ENCOUNTER (INPATIENT)
Facility: HOSPITAL | Age: 43
LOS: 7 days | Discharge: HOME | End: 2018-03-22
Attending: INTERNAL MEDICINE | Admitting: HOSPITALIST

## 2018-03-15 ENCOUNTER — OFFICE VISIT (OUTPATIENT)
Dept: NEUROSURGERY | Facility: CLINIC | Age: 43
End: 2018-03-15

## 2018-03-15 VITALS
RESPIRATION RATE: 16 BRPM | DIASTOLIC BLOOD PRESSURE: 72 MMHG | TEMPERATURE: 97.9 F | OXYGEN SATURATION: 97 % | WEIGHT: 168 LBS | BODY MASS INDEX: 24.88 KG/M2 | HEIGHT: 69 IN | HEART RATE: 67 BPM | SYSTOLIC BLOOD PRESSURE: 129 MMHG

## 2018-03-15 VITALS
OXYGEN SATURATION: 100 % | WEIGHT: 163 LBS | BODY MASS INDEX: 24.14 KG/M2 | DIASTOLIC BLOOD PRESSURE: 75 MMHG | SYSTOLIC BLOOD PRESSURE: 114 MMHG | HEIGHT: 69 IN | HEART RATE: 61 BPM

## 2018-03-15 DIAGNOSIS — D32.9 MENINGIOMA (HCC): ICD-10-CM

## 2018-03-15 DIAGNOSIS — G43.011 INTRACTABLE MIGRAINE WITHOUT AURA AND WITH STATUS MIGRAINOSUS: Primary | ICD-10-CM

## 2018-03-15 PROBLEM — M79.7 FIBROMYALGIA: Status: ACTIVE | Noted: 2018-03-15

## 2018-03-15 PROBLEM — D33.2 BENIGN BRAIN TUMOR (HCC): Status: ACTIVE | Noted: 2018-03-15

## 2018-03-15 PROBLEM — Z86.69 HISTORY OF MIGRAINE HEADACHES: Status: ACTIVE | Noted: 2018-03-15

## 2018-03-15 LAB
ALBUMIN SERPL-MCNC: 3.7 G/DL (ref 3.2–4.8)
ALBUMIN/GLOB SERPL: 1.6 G/DL (ref 1.5–2.5)
ALP SERPL-CCNC: 61 U/L (ref 25–100)
ALT SERPL W P-5'-P-CCNC: 53 U/L (ref 7–40)
ANION GAP SERPL CALCULATED.3IONS-SCNC: 8 MMOL/L (ref 3–11)
AST SERPL-CCNC: 39 U/L (ref 0–33)
BASOPHILS # BLD AUTO: 0.01 10*3/MM3 (ref 0–0.2)
BASOPHILS NFR BLD AUTO: 0.2 % (ref 0–1)
BILIRUB SERPL-MCNC: 0.4 MG/DL (ref 0.3–1.2)
BUN BLD-MCNC: 6 MG/DL (ref 9–23)
BUN/CREAT SERPL: 10 (ref 7–25)
CALCIUM SPEC-SCNC: 8.2 MG/DL (ref 8.7–10.4)
CHLORIDE SERPL-SCNC: 108 MMOL/L (ref 99–109)
CO2 SERPL-SCNC: 24 MMOL/L (ref 20–31)
CREAT BLD-MCNC: 0.6 MG/DL (ref 0.6–1.3)
DEPRECATED RDW RBC AUTO: 44.6 FL (ref 37–54)
EOSINOPHIL # BLD AUTO: 0.12 10*3/MM3 (ref 0–0.3)
EOSINOPHIL NFR BLD AUTO: 2.2 % (ref 0–3)
ERYTHROCYTE [DISTWIDTH] IN BLOOD BY AUTOMATED COUNT: 14.1 % (ref 11.3–14.5)
GFR SERPL CREATININE-BSD FRML MDRD: 109 ML/MIN/1.73
GLOBULIN UR ELPH-MCNC: 2.3 GM/DL
GLUCOSE BLD-MCNC: 112 MG/DL (ref 70–100)
HCT VFR BLD AUTO: 35.2 % (ref 34.5–44)
HGB BLD-MCNC: 11.5 G/DL (ref 11.5–15.5)
IMM GRANULOCYTES # BLD: 0 10*3/MM3 (ref 0–0.03)
IMM GRANULOCYTES NFR BLD: 0 % (ref 0–0.6)
LYMPHOCYTES # BLD AUTO: 3.07 10*3/MM3 (ref 0.6–4.8)
LYMPHOCYTES NFR BLD AUTO: 55.1 % (ref 24–44)
MCH RBC QN AUTO: 28.5 PG (ref 27–31)
MCHC RBC AUTO-ENTMCNC: 32.7 G/DL (ref 32–36)
MCV RBC AUTO: 87.3 FL (ref 80–99)
MONOCYTES # BLD AUTO: 0.31 10*3/MM3 (ref 0–1)
MONOCYTES NFR BLD AUTO: 5.6 % (ref 0–12)
NEUTROPHILS # BLD AUTO: 2.06 10*3/MM3 (ref 1.5–8.3)
NEUTROPHILS NFR BLD AUTO: 36.9 % (ref 41–71)
PLAT MORPH BLD: NORMAL
PLATELET # BLD AUTO: 116 10*3/MM3 (ref 150–450)
PMV BLD AUTO: 11.3 FL (ref 6–12)
POTASSIUM BLD-SCNC: 3.1 MMOL/L (ref 3.5–5.5)
PROT SERPL-MCNC: 6 G/DL (ref 5.7–8.2)
RBC # BLD AUTO: 4.03 10*6/MM3 (ref 3.89–5.14)
RBC MORPH BLD: NORMAL
SODIUM BLD-SCNC: 140 MMOL/L (ref 132–146)
VALPROATE SERPL-MCNC: <1 MCG/ML (ref 50–100)
WBC MORPH BLD: NORMAL
WBC NRBC COR # BLD: 5.57 10*3/MM3 (ref 3.5–10.8)

## 2018-03-15 PROCEDURE — 99223 1ST HOSP IP/OBS HIGH 75: CPT | Performed by: HOSPITALIST

## 2018-03-15 PROCEDURE — 25010000002 PROMETHAZINE PER 50 MG: Performed by: HOSPITALIST

## 2018-03-15 PROCEDURE — 99203 OFFICE O/P NEW LOW 30 MIN: CPT | Performed by: NEUROLOGICAL SURGERY

## 2018-03-15 PROCEDURE — 80053 COMPREHEN METABOLIC PANEL: CPT | Performed by: NURSE PRACTITIONER

## 2018-03-15 PROCEDURE — 85025 COMPLETE CBC W/AUTO DIFF WBC: CPT | Performed by: NURSE PRACTITIONER

## 2018-03-15 PROCEDURE — 85007 BL SMEAR W/DIFF WBC COUNT: CPT | Performed by: NURSE PRACTITIONER

## 2018-03-15 PROCEDURE — 25010000002 BUTORPHANOL PER 1 MG: Performed by: HOSPITALIST

## 2018-03-15 PROCEDURE — 25010000002 PROMETHAZINE PER 50 MG: Performed by: NURSE PRACTITIONER

## 2018-03-15 PROCEDURE — 25010000002 DIPHENHYDRAMINE PER 50 MG: Performed by: NURSE PRACTITIONER

## 2018-03-15 RX ORDER — BUTALBITAL, ACETAMINOPHEN AND CAFFEINE 50; 325; 40 MG/1; MG/1; MG/1
2 TABLET ORAL EVERY 4 HOURS PRN
Status: DISCONTINUED | OUTPATIENT
Start: 2018-03-15 | End: 2018-03-15

## 2018-03-15 RX ORDER — CETIRIZINE HYDROCHLORIDE 10 MG/1
10 TABLET ORAL DAILY
Status: DISCONTINUED | OUTPATIENT
Start: 2018-03-15 | End: 2018-03-22 | Stop reason: HOSPADM

## 2018-03-15 RX ORDER — BUSPIRONE HYDROCHLORIDE 15 MG/1
15 TABLET ORAL EVERY 8 HOURS SCHEDULED
Status: DISCONTINUED | OUTPATIENT
Start: 2018-03-15 | End: 2018-03-22 | Stop reason: HOSPADM

## 2018-03-15 RX ORDER — POTASSIUM CHLORIDE 1.5 G/1.77G
40 POWDER, FOR SOLUTION ORAL AS NEEDED
Status: DISCONTINUED | OUTPATIENT
Start: 2018-03-15 | End: 2018-03-22 | Stop reason: HOSPADM

## 2018-03-15 RX ORDER — POTASSIUM CHLORIDE 750 MG/1
40 CAPSULE, EXTENDED RELEASE ORAL AS NEEDED
Status: DISCONTINUED | OUTPATIENT
Start: 2018-03-15 | End: 2018-03-22 | Stop reason: HOSPADM

## 2018-03-15 RX ORDER — MAGNESIUM SULFATE HEPTAHYDRATE 40 MG/ML
2 INJECTION, SOLUTION INTRAVENOUS AS NEEDED
Status: DISCONTINUED | OUTPATIENT
Start: 2018-03-15 | End: 2018-03-22 | Stop reason: HOSPADM

## 2018-03-15 RX ORDER — SODIUM CHLORIDE 9 MG/ML
100 INJECTION, SOLUTION INTRAVENOUS CONTINUOUS
Status: DISCONTINUED | OUTPATIENT
Start: 2018-03-15 | End: 2018-03-17

## 2018-03-15 RX ORDER — DIPHENHYDRAMINE HYDROCHLORIDE 50 MG/ML
25 INJECTION INTRAMUSCULAR; INTRAVENOUS ONCE
Status: DISCONTINUED | OUTPATIENT
Start: 2018-03-15 | End: 2018-03-15 | Stop reason: ALTCHOICE

## 2018-03-15 RX ORDER — PROMETHAZINE HYDROCHLORIDE 25 MG/ML
12.5 INJECTION, SOLUTION INTRAMUSCULAR; INTRAVENOUS EVERY 6 HOURS PRN
Status: DISCONTINUED | OUTPATIENT
Start: 2018-03-15 | End: 2018-03-22 | Stop reason: HOSPADM

## 2018-03-15 RX ORDER — SODIUM CHLORIDE 0.9 % (FLUSH) 0.9 %
1-10 SYRINGE (ML) INJECTION AS NEEDED
Status: DISCONTINUED | OUTPATIENT
Start: 2018-03-15 | End: 2018-03-22 | Stop reason: HOSPADM

## 2018-03-15 RX ORDER — PROMETHAZINE HYDROCHLORIDE 25 MG/ML
12.5 INJECTION, SOLUTION INTRAMUSCULAR; INTRAVENOUS EVERY 6 HOURS PRN
Status: DISCONTINUED | OUTPATIENT
Start: 2018-03-15 | End: 2018-03-20 | Stop reason: SDUPTHER

## 2018-03-15 RX ORDER — PROMETHAZINE HYDROCHLORIDE 12.5 MG/1
12.5 TABLET ORAL EVERY 6 HOURS PRN
Status: DISCONTINUED | OUTPATIENT
Start: 2018-03-15 | End: 2018-03-22 | Stop reason: HOSPADM

## 2018-03-15 RX ORDER — SERTRALINE HYDROCHLORIDE 100 MG/1
100 TABLET, FILM COATED ORAL 2 TIMES DAILY
Status: DISCONTINUED | OUTPATIENT
Start: 2018-03-15 | End: 2018-03-22 | Stop reason: HOSPADM

## 2018-03-15 RX ORDER — PROMETHAZINE HYDROCHLORIDE 25 MG/ML
25 INJECTION, SOLUTION INTRAMUSCULAR; INTRAVENOUS ONCE
Status: DISCONTINUED | OUTPATIENT
Start: 2018-03-15 | End: 2018-03-15 | Stop reason: ALTCHOICE

## 2018-03-15 RX ORDER — MAGNESIUM SULFATE HEPTAHYDRATE 40 MG/ML
4 INJECTION, SOLUTION INTRAVENOUS AS NEEDED
Status: DISCONTINUED | OUTPATIENT
Start: 2018-03-15 | End: 2018-03-22 | Stop reason: HOSPADM

## 2018-03-15 RX ADMIN — PROMETHAZINE HYDROCHLORIDE 12.5 MG: 25 INJECTION INTRAMUSCULAR; INTRAVENOUS at 18:07

## 2018-03-15 RX ADMIN — VALPROATE SODIUM: 100 INJECTION, SOLUTION INTRAVENOUS at 22:42

## 2018-03-15 RX ADMIN — BUTORPHANOL TARTRATE 2 MG: 2 INJECTION, SOLUTION INTRAMUSCULAR; INTRAVENOUS at 21:39

## 2018-03-15 RX ADMIN — BUTALBITAL, ACETAMINOPHEN AND CAFFEINE 2 TABLET: 50; 325; 40 TABLET ORAL at 18:06

## 2018-03-15 RX ADMIN — CETIRIZINE HYDROCHLORIDE 10 MG: 10 TABLET, FILM COATED ORAL at 18:06

## 2018-03-15 RX ADMIN — SERTRALINE HYDROCHLORIDE 100 MG: 100 TABLET ORAL at 20:51

## 2018-03-15 RX ADMIN — POTASSIUM CHLORIDE 40 MEQ: 750 CAPSULE, EXTENDED RELEASE ORAL at 20:51

## 2018-03-15 RX ADMIN — CARBIDOPA AND LEVODOPA 1 TABLET: 10; 100 TABLET ORAL at 20:51

## 2018-03-15 RX ADMIN — BUSPIRONE HYDROCHLORIDE 15 MG: 15 TABLET ORAL at 20:51

## 2018-03-15 RX ADMIN — SODIUM CHLORIDE 1000 ML: 9 INJECTION, SOLUTION INTRAVENOUS at 20:27

## 2018-03-15 RX ADMIN — SODIUM CHLORIDE 100 ML/HR: 9 INJECTION, SOLUTION INTRAVENOUS at 18:07

## 2018-03-15 RX ADMIN — DIPHENHYDRAMINE HYDROCHLORIDE: 50 INJECTION, SOLUTION INTRAMUSCULAR; INTRAVENOUS at 20:48

## 2018-03-15 NOTE — PROGRESS NOTES
Susanna Long  1975  [id number]      Chief Complaint   Patient presents with   • Brain Tumor   • Dizziness       HISTORY OF PRESENT ILLNESS:  This is a 43-year-old female who has a known migraine headache, has been a patient of Dr. Key in [place], presented to the emergency department at [facility] were a CT scan was performed and was interpreted as being possible subarachnoid hemorrhage.  I reviewed the CT scan and found no evidence of a hemorrhage.  She has a small asymptomatic meningioma in the right frontal area which has been followed by neurology.  She was sent to the neurosurgery clinic for her migraine.     Past Medical History:   Diagnosis Date   • Abdominal pain    • Abdominal swelling    • Hoyos's disease    • Bipolar 1 disorder    • Brain tumor     R Frontal Lobe per pt   • Brain tumor 2014   • Brain tumor (benign)    • Constipation    • DDD (degenerative disc disease), cervical 05/29/2017   • DDD (degenerative disc disease), cervical    • Diarrhea    • Fibromyalgia    • IBS (irritable bowel syndrome)    • Migraine    • Nausea & vomiting    • PONV (postoperative nausea and vomiting)    • PTSD (post-traumatic stress disorder)    • Rectal bleeding        Past Surgical History:   Procedure Laterality Date   • ANAL SCOPE N/A 7/28/2016    Procedure: ANAL SCOPE;  Surgeon: Kael Lopez MD;  Location: [facility];  Service:    • APPENDECTOMY     • COLONOSCOPY N/A 6/30/2016    Procedure: COLONOSCOPY  CPTCODE:17336;  Surgeon: Jose Antonio Belle III, MD;  Location: [facility] OR;  Service:    • COLONOSCOPY N/A 7/7/2016    Procedure: COLONOSCOPY (16271) CPT;  Surgeon: Jose Antonio Belle III, MD;  Location: [facility] OR;  Service:    • CYSTOSCOPY RETROGRADE PYELOGRAM Bilateral 4/28/2017    Procedure: CYSTOSCOPY RETROGRADE PYELOGRAM;  Surgeon: Mu Blunt MD;  Location: [facility] OR;  Service:    • ENDOSCOPY N/A 6/30/2016    Procedure: ESOPHAGOGASTRODUODENOSCOPY WITH BIOPSY  CPTCODE:81439;  Surgeon: Jose Antonio  Reynold Belle III, MD;  Location: [facility] OR;  Service:    • HEMORRHOIDECTOMY N/A 7/28/2016    Procedure: HEMORRHOID STAPLING;  Surgeon: Kael Lopez MD;  Location: [facility] OR;  Service:    • HYSTERECTOMY     • KNEE SURGERY     • LAPAROSCOPIC SALPINGOOPHERECTOMY     • PORTACATH PLACEMENT N/A 7/28/2017    Procedure: INSERTION OF PORTACATH;  Surgeon: Celso Arredondo MD;  Location: [facility] OR;  Service:    • SHOULDER SURGERY      3 times       Family History   Problem Relation Age of Onset   • Crohn's disease Other    • Hypertension Other    • Diabetes Other    • Irritable bowel syndrome Other        Social History     Social History   • Marital status:      Spouse name: N/A   • Number of children: N/A   • Years of education: N/A     Occupational History   • Not on file.     Social History Main Topics   • Smoking status: Former Smoker     Packs/day: 1.00     Years: 20.00     Quit date: 11/1/2015   • Smokeless tobacco: Never Used   • Alcohol use No   • Drug use:      Types: Marijuana      Comment: for pain   • Sexual activity: Defer     Other Topics Concern   • Not on file     Social History Narrative   • No narrative on file       Allergies   Allergen Reactions   • Ativan [Lorazepam] Hallucinations     confusion   • Sulfa Antibiotics Shortness Of Breath and Swelling   • Compazine [Prochlorperazine Edisylate] Hives   • Demerol [Meperidine] Hives   • Droperidol Itching   • Metoclopramide Swelling   • Toradol [Ketorolac Tromethamine] Hives and Itching   • Zofran [Ondansetron Hcl] Rash         Current Outpatient Prescriptions:   •  azelastine (ASTEPRO) 0.15 % solution nasal spray, 2 sprays into each nostril 2 (Two) Times a Day., Disp: 30 mL, Rfl: 0  •  azithromycin (ZITHROMAX) 250 MG tablet, Take 2 tablets the first day, then 1 tablet daily for 4 days., Disp: 6 tablet, Rfl: 0  •  benzonatate (TESSALON) 200 MG capsule, Take 1 capsule by mouth 3 (Three) Times a Day As Needed for Cough., Disp: 30 capsule, Rfl: 0  •   busPIRone (BUSPAR) 15 MG tablet, Take 15 mg by mouth 3 (three) times a day  , Disp: , Rfl:   •  butorphanol (STADOL) 10 MG/ML nasal spray, 1 spray into each nostril Daily., Disp: , Rfl:   •  carbidopa-levodopa (SINEMET)  MG per tablet, Take 1 tablet by mouth 3 (Three) Times a Day., Disp: , Rfl:   •  cetirizine (zyrTEC) 10 MG tablet, Take 1 tablet by mouth Daily., Disp: 30 tablet, Rfl: 0  •  dicyclomine (BENTYL) 20 MG tablet, Take 1 tablet by mouth Every 6 (Six) Hours As Needed (Abdominal Cramping)., Disp: 10 tablet, Rfl: 0  •  diflunisal (DOLOBID) 500 MG tablet tablet, Take 1 tablet by mouth 2 (Two) Times a Day., Disp: 20 tablet, Rfl: 0  •  divalproex (DEPAKOTE) 500 MG DR tablet, Take 250 mg by mouth 3 (Three) Times a Day., Disp: , Rfl:   •  famotidine (PEPCID) 40 MG tablet, Take 40 mg by mouth 2 (Two) Times a Day As Needed for heartburn., Disp: , Rfl:   •  guaiFENesin (MUCINEX) 600 MG 12 hr tablet, Take 2 tablets by mouth 2 (Two) Times a Day., Disp: 20 tablet, Rfl: 0  •  guaifenesin-dextromethorphan (MUCINEX DM)  MG tablet sustained-release 12 hour tablet, Take 1-2 tablets by mouth 2 (Two) Times a Day As Needed (cough/congestion)., Disp: 40 tablet, Rfl: 0  •  HYDROcodone-acetaminophen (NORCO) 7.5-325 MG per tablet, Take 1 tablet by mouth Every 6 (Six) Hours As Needed for Moderate Pain (4-6)., Disp: , Rfl:   •  MethylPREDNISolone (MEDROL, BRET,) 4 MG tablet, Take as directed on package instructions., Disp: 21 each, Rfl: 0  •  oxybutynin (DITROPAN) 5 MG tablet, Take 1 tablet by mouth 3 (Three) Times a Day., Disp: 90 tablet, Rfl: 11  •  oxyCODONE-acetaminophen (PERCOCET)  MG per tablet, Take 1 tablet by mouth Every 6 (Six) Hours As Needed for Moderate Pain ., Disp: 12 tablet, Rfl: 0  •  oxyCODONE-acetaminophen (PERCOCET) 5-325 MG per tablet, 1 to 2 Tablets Every 6 Hours as needed for PAIN, Disp: 20 tablet, Rfl: 0  •  oxyCODONE-acetaminophen (PERCOCET) 5-325 MG per tablet, 1 to 2 Tablets Every 6 Hours  "as needed for PAIN, Disp: 20 tablet, Rfl: 0  •  pantoprazole (PROTONIX) 40 MG EC tablet, Take 40 mg by mouth Daily As Needed., Disp: , Rfl:   •  polyethylene glycol (GoLYTELY) 236 g solution, Starting at 6 pm on day prior to procedure, drink 8 ounces every 15 minutes until all gone or stools are clear. May add flavor packet., Disp: 4000 mL, Rfl: 0  •  potassium chloride (K-DUR) 10 MEQ CR tablet, Take 1 tablet by mouth Daily., Disp: 12 tablet, Rfl: 0  •  promethazine (PHENERGAN) 25 MG tablet, Take 1 tablet by mouth Every 6 (Six) Hours As Needed for Nausea or Vomiting., Disp: 30 tablet, Rfl: 0  •  sertraline (ZOLOFT) 100 MG tablet, Take 100 mg by mouth 2 (Two) Times a Day., Disp: , Rfl:   •  SUMAtriptan (IMITREX) 6 MG/0.5ML solution injection, Inject 6 mg under the skin 1 (One) Time., Disp: , Rfl:     Current Facility-Administered Medications:   •  gentamicin (GARAMYCIN) injection 80 mg, 80 mg, Intramuscular, Once, Mu Blunt MD    Review of Systems   Constitutional: Positive for activity change, appetite change and fatigue.   Eyes: Positive for pain.   Musculoskeletal: Positive for neck pain and neck stiffness.   Neurological: Positive for dizziness, light-headedness and headaches.       Vitals:    03/15/18 0949   BP: 114/75   Pulse: 61   SpO2: 100%   Weight: 73.9 kg (163 lb)   Height: 175.3 cm (69\")       Neurological Examination:        Mental status/speech: The patient is alert and oriented.  Speech is clear without aphysia or dysarthria.  No overt cognitive deficits.    Cranial nerve examination:    Olfaction: Smell is intact.  Vision: Vision is intact without visual field abnormalities.  Funduscopic examination is normal.  No pupillary irregularity.  Ocular motor examination: The extraocular muscles are intact.  There is no diplopia.  The pupil is round and reactive to both light and accommodation.  There is no nystagmus.  Facial movement/sensation: There is no facial weakness.  Sensation is intact " in the first, second, and third divisions of the trigeminal nerve.  The corneal reflex is intact.  Auditory: Hearing is intact to finger rub bilaterally.  Cranial nerves IX, X, XI, XII: Phonation is normal.  No dysphagia.  Tongue is protruded in the midline without atrophy.  The gag reflex is intact.  Shoulder shrug is normal.    Musculoligamentous ligamentous examination: [Limited range of motion secondary to acute headache and nausea and vomiting.  I am unable to find weakness sensory loss or reflex asymmetry. ]      Medical Decision Making:     Diagnostic Data Set:  The CT data set shows an asymptomatic meningioma in the right frontal area otherwise within normal limits.      Assessment:  Acute migraine headache          Recommendations:  [ I have called [facility] in efforts to reach the hospitalist and Dr. Key.  She has been hospitalized in the past for this for 3 days with the intravenous protocol for migraine which is been quite helpful.  They will not do that here.]        I greatly appreciate the opportunity to see and evaluate this individual.  If you have questions or concerns regarding issues that I may have overlooked please call me at any time: [phone].  Chris Howell M.D.  [facility]  [address] [place] [address].  [facility]  [id number]

## 2018-03-15 NOTE — H&P
[facility] Medicine Services  HISTORY AND PHYSICAL    Patient Name: Susanna Camilo  : 1975  MRN: [id number]  Primary Care Physician: MIGDALIA Gooden    Subjective   Subjective     Chief Complaint:  headache    HPI:  Susanna Camilo is a 43 y.o. female with past medical history of fibromyalgia, IBS, Crohn's, benign brain tumor, Hoyos's disease, urethral stenosis and migraines since age 19.  She presented to [facility] [place] ED yesterday with c/o headache.  CT scan was performed and was interpreted as being possible subarachnoid hemorrhage.  Dr. Howell was notified and reviewed the CT scan and found no evidence of hemorrhage.  She has a small asymptomatic meningioma in the right frontal area which as been followed by neurology.  She was sent home and had a follow up this morning in the neurosurgery clinic.  Decision was made to have patient admitted to the hospital for acute migraine headache with associated nausea and vomiting with neurology consult.  Patient states headache is 7/10 and worse with light exposure and position changing.  She also c/o slight blurry vision, neck soreness, dizziness, and pre-syncope episode yesterday.  Patient will be admitted to the hospital medicine service for further evaluation and management.      Review of Systems   Constitutional: Positive for appetite change. Negative for chills and fever.   HENT: Negative for congestion, sneezing and sore throat.    Eyes: Negative.    Respiratory: Negative for cough, shortness of breath and wheezing.    Cardiovascular: Negative for chest pain, palpitations and leg swelling.   Gastrointestinal: Positive for nausea and vomiting. Negative for abdominal pain, constipation and diarrhea.   Endocrine: Negative.    Genitourinary: Negative for dysuria and urgency.   Musculoskeletal: Positive for neck pain.   Skin: Negative.    Neurological: Positive for dizziness, weakness and headaches. Negative for speech difficulty.    Psychiatric/Behavioral: Negative for confusion and decreased concentration.        Otherwise 10-system ROS reviewed and is negative except as mentioned in the HPI.    Personal History     Past Medical History:   Diagnosis Date   • Abdominal pain    • Abdominal swelling    • Hoyos's disease    • Bipolar 1 disorder    • Brain tumor     R Frontal Lobe per pt   • Brain tumor 2014   • Brain tumor (benign)    • Constipation    • DDD (degenerative disc disease), cervical 05/29/2017   • DDD (degenerative disc disease), cervical    • Diarrhea    • Fibromyalgia    • IBS (irritable bowel syndrome)    • Migraine    • Nausea & vomiting    • PONV (postoperative nausea and vomiting)    • PTSD (post-traumatic stress disorder)    • Rectal bleeding        Past Surgical History:   Procedure Laterality Date   • ANAL SCOPE N/A 7/28/2016    Procedure: ANAL SCOPE;  Surgeon: Kael Lopez MD;  Location: [facility] OR;  Service:    • APPENDECTOMY     • COLONOSCOPY N/A 6/30/2016    Procedure: COLONOSCOPY  CPTCODE:72545;  Surgeon: Jose Antonio Belle III, MD;  Location: [facility] OR;  Service:    • COLONOSCOPY N/A 7/7/2016    Procedure: COLONOSCOPY (46978) CPT;  Surgeon: Jose Antonio Belle III, MD;  Location: [facility] OR;  Service:    • CYSTOSCOPY RETROGRADE PYELOGRAM Bilateral 4/28/2017    Procedure: CYSTOSCOPY RETROGRADE PYELOGRAM;  Surgeon: Mu Blunt MD;  Location: [facility] OR;  Service:    • ENDOSCOPY N/A 6/30/2016    Procedure: ESOPHAGOGASTRODUODENOSCOPY WITH BIOPSY  CPTCODE:57717;  Surgeon: Jose Antonio Belle III, MD;  Location: [facility] OR;  Service:    • HEMORRHOIDECTOMY N/A 7/28/2016    Procedure: HEMORRHOID STAPLING;  Surgeon: Kael Lopez MD;  Location: [facility] OR;  Service:    • HYSTERECTOMY     • KNEE SURGERY     • LAPAROSCOPIC SALPINGOOPHERECTOMY     • PORTACATH PLACEMENT N/A 7/28/2017    Procedure: INSERTION OF PORTACATH;  Surgeon: Celso Arredondo MD;  Location: [facility] OR;  Service:    • SHOULDER SURGERY      3  times     Family History: family history includes Crohn's disease in her other; Diabetes in her other; Hypertension in her other; Irritable bowel syndrome in her other.     Social History:  reports that she quit smoking about 2 years ago. She has a 20.00 pack-year smoking history. She has never used smokeless tobacco. She reports that she uses drugs, including Marijuana. She reports that she does not drink alcohol.  Social History     Social History Narrative   • No narrative on file       Medications:  Facility-Administered Medications Prior to Admission   Medication Dose Route Frequency Provider Last Rate Last Dose   • gentamicin (GARAMYCIN) injection 80 mg  80 mg Intramuscular Once Mu Blunt MD         Prescriptions Prior to Admission   Medication Sig Dispense Refill Last Dose   • azelastine (ASTEPRO) 0.15 % solution nasal spray 2 sprays into each nostril 2 (Two) Times a Day. 30 mL 0 Taking   • azithromycin (ZITHROMAX) 250 MG tablet Take 2 tablets the first day, then 1 tablet daily for 4 days. 6 tablet 0    • benzonatate (TESSALON) 200 MG capsule Take 1 capsule by mouth 3 (Three) Times a Day As Needed for Cough. 30 capsule 0 Taking   • busPIRone (BUSPAR) 15 MG tablet Take 15 mg by mouth 3 (three) times a day      Taking   • butorphanol (STADOL) 10 MG/ML nasal spray 1 spray into each nostril Daily.   Taking   • carbidopa-levodopa (SINEMET)  MG per tablet Take 1 tablet by mouth 3 (Three) Times a Day.   Taking   • cetirizine (zyrTEC) 10 MG tablet Take 1 tablet by mouth Daily. 30 tablet 0 Taking   • dicyclomine (BENTYL) 20 MG tablet Take 1 tablet by mouth Every 6 (Six) Hours As Needed (Abdominal Cramping). 10 tablet 0    • diflunisal (DOLOBID) 500 MG tablet tablet Take 1 tablet by mouth 2 (Two) Times a Day. 20 tablet 0    • divalproex (DEPAKOTE) 500 MG DR tablet Take 250 mg by mouth 3 (Three) Times a Day.   Taking   • famotidine (PEPCID) 40 MG tablet Take 40 mg by mouth 2 (Two) Times a Day As  Needed for heartburn.   Taking   • guaiFENesin (MUCINEX) 600 MG 12 hr tablet Take 2 tablets by mouth 2 (Two) Times a Day. 20 tablet 0 Taking   • guaifenesin-dextromethorphan (MUCINEX DM)  MG tablet sustained-release 12 hour tablet Take 1-2 tablets by mouth 2 (Two) Times a Day As Needed (cough/congestion). 40 tablet 0    • HYDROcodone-acetaminophen (NORCO) 7.5-325 MG per tablet Take 1 tablet by mouth Every 6 (Six) Hours As Needed for Moderate Pain (4-6).   Taking   • MethylPREDNISolone (MEDROL, BRET,) 4 MG tablet Take as directed on package instructions. 21 each 0    • oxybutynin (DITROPAN) 5 MG tablet Take 1 tablet by mouth 3 (Three) Times a Day. 90 tablet 11 Taking   • oxyCODONE-acetaminophen (PERCOCET)  MG per tablet Take 1 tablet by mouth Every 6 (Six) Hours As Needed for Moderate Pain . 12 tablet 0    • oxyCODONE-acetaminophen (PERCOCET) 5-325 MG per tablet 1 to 2 Tablets Every 6 Hours as needed for PAIN 20 tablet 0    • oxyCODONE-acetaminophen (PERCOCET) 5-325 MG per tablet 1 to 2 Tablets Every 6 Hours as needed for PAIN 20 tablet 0    • pantoprazole (PROTONIX) 40 MG EC tablet Take 40 mg by mouth Daily As Needed.   Taking   • polyethylene glycol (GoLYTELY) 236 g solution Starting at 6 pm on day prior to procedure, drink 8 ounces every 15 minutes until all gone or stools are clear. May add flavor packet. 4000 mL 0 Taking   • potassium chloride (K-DUR) 10 MEQ CR tablet Take 1 tablet by mouth Daily. 12 tablet 0 Taking   • promethazine (PHENERGAN) 25 MG tablet Take 1 tablet by mouth Every 6 (Six) Hours As Needed for Nausea or Vomiting. 30 tablet 0 Taking   • sertraline (ZOLOFT) 100 MG tablet Take 100 mg by mouth 2 (Two) Times a Day.   Taking   • SUMAtriptan (IMITREX) 6 MG/0.5ML solution injection Inject 6 mg under the skin 1 (One) Time.   Taking       Allergies   Allergen Reactions   • Ativan [Lorazepam] Hallucinations     confusion   • Sulfa Antibiotics Shortness Of Breath and Swelling   • Compazine  [Prochlorperazine Edisylate] Hives   • Demerol [Meperidine] Hives   • Droperidol Itching   • Metoclopramide Swelling   • Toradol [Ketorolac Tromethamine] Hives and Itching   • Zofran [Ondansetron Hcl] Rash       Objective   Objective     Vital Signs:   Temp:  [97.6 °F (36.4 °C)] 97.6 °F (36.4 °C)  Heart Rate:  [55-61] 55  Resp:  [20] 20  BP: (114-139)/(75-88) 139/88        Physical Exam   Constitutional: No acute distress, awake, alert  Eyes: PERRLA, sclerae anicteric, no conjunctival injection  HENT: NCAT, mucous membranes moist  Neck: Supple, no thyromegaly, no lymphadenopathy, trachea midline  Respiratory: Clear to auscultation bilaterally, nonlabored respirations   Cardiovascular: RRR, no murmurs, rubs, or gallops, palpable pedal pulses bilaterally  Gastrointestinal: Positive bowel sounds, soft, nontender, nondistended  Musculoskeletal: No bilateral ankle edema, no clubbing or cyanosis to extremities  Psychiatric: Appropriate affect, cooperative  Neurologic: Oriented x 3, strength symmetric in all extremities, Cranial Nerves grossly intact to confrontation, speech clear  Skin: No rashes    Results Reviewed:  I have personally reviewed current lab, radiology, and data and agree.      Results from last 7 days  Lab Units 03/14/18  2043   WBC 10*3/mm3 8.17   HEMOGLOBIN g/dL 12.0   HEMATOCRIT % 36.2*   PLATELETS 10*3/mm3 144       Results from last 7 days  Lab Units 03/14/18  2043   SODIUM mmol/L 141   POTASSIUM mmol/L 3.6   CHLORIDE mmol/L 110   CO2 mmol/L 25.4   BUN mg/dL 8   CREATININE mg/dL 0.48   GLUCOSE mg/dL 95   CALCIUM mg/dL 9.2   ALT (SGPT) U/L 54*   AST (SGOT) U/L 37*     Estimated Creatinine Clearance: 176.3 mL/min (by C-G formula based on SCr of 0.48 mg/dL).  Brief Urine Lab Results  (Last result in the past 365 days)      Color   Clarity   Blood   Leuk Est   Nitrite   Protein   CREAT   Urine HCG        03/14/18 2221 Yellow Clear Negative Trace(A) Negative Negative         03/14/18 2221                Negative         No results found for: BNP  No results found for: PHART  Imaging Results (last 24 hours)     ** No results found for the last 24 hours. **          Assessment/Plan   Assessment / Plan     Hospital Problem List     Intractable headache    Nausea and vomiting    History of migraine headaches    Benign brain tumor    Fibromyalgia          Assessment & Plan:    --consult neurology  --migraine treatment with plans for neurology to see patient in the AM, patient with multiple allergies, will try IVF's, phenergan and Fioricet PRN  --defer further imaging to neurology  --clear liquid diet and advance as tolerated  --continue home meds    DVT prophylaxis:  Lovenox, TEDs/SCDs    CODE STATUS:  Full Code    Admission Status:  I believe this patient meets INPATIENT status due to the need for care which can only be reasonably provided in an hospital setting such as aggressive/expedited ancillary services and/or consultation services, the necessity for IV medications, close physician monitoring and/or the possible need for procedures.  In such, I feel patient’s risk for adverse outcomes and need for care warrant INPATIENT evaluation and predict the patient’s care encounter to likely last beyond 2 midnights.    Electronically signed by MIGDALIA Abraham, 03/15/18, 4:22 PM.      Brief Attending Admission Attestation     I have seen and examined the patient, performing an independent face-to-face diagnostic evaluation with plan of care reviewed and developed with the advanced practice clinician (APC).      Brief Summary Statement/HPI:   Susanna Camilo is a 43 y.o. female with migraine was transferred from Dr. Soto's office for DHE protocol. Currently having a migraine ongoing for 6 days. Has previously tried everything. Only DHE and botox works. +Nausea, +photosensitivity.      Attending Physical Exam:  Constitutional: No acute distress, awake, alert on RA  Eyes: PERRLA, sclerae anicteric, no conjunctival  injection  HENT: NCAT, mucous membranes moist  Neck: Supple, no thyromegaly, no lymphadenopathy, trachea midline  Respiratory: Clear to auscultation bilaterally, nonlabored respirations   Cardiovascular: RRR, no murmurs, rubs, or gallops, palpable pedal pulses bilaterally  Gastrointestinal: Positive bowel sounds, soft, nontender, nondistended  Musculoskeletal: No bilateral ankle edema, no clubbing or cyanosis to extremities  Psychiatric: Appropriate affect, cooperative  Neurologic: Oriented x 3, strength symmetric in all extremities, Cranial Nerves grossly intact to confrontation, speech clear  Skin: No rashes    Brief Assessment/Plan :  See above for further detailed assessment and plan developed with APC which I have reviewed and/or edited.    - Intractable migraines: Migraine cocktails with IVFs, anti-emetics. 1x stadol dose tonight. Pt does not appear to be a drug seeker. Obtain [facility]. Neuro to order DHE protocol in AM. All head imaging negative for acute abnormalities. Pt has had LP 2 yrs prior for possible idiopathic intracranial hypertension and was reportedly neg. Discussed with Neuro-on-call. Will load with 1x Depakote 1g IV    Electronically signed by Sangeeta Virgen MD, 03/15/18, 10:27 PM.

## 2018-03-15 NOTE — ED NOTES
TAMI Weir at bedside; states he just got off the phone with Dr. Howell the neurologist, states that pts head CT does not show any new acute problems and the pt does not need to be transferred at this time; pt and family informed and all questions and concerns addressed at this time     Fatimah Lucas RN  03/15/18 0242

## 2018-03-15 NOTE — ED PROVIDER NOTES
Subjective     History provided by:  Patient  Headache   Pain location:  Generalized  Quality:  Sharp and stabbing  Radiates to:  Does not radiate  Severity currently:  10/10  Severity at highest:  10/10  Onset quality:  Sudden  Duration:  3 days  Timing:  Constant  Progression:  Worsening  Chronicity:  Chronic  Similar to prior headaches: no    Context: activity, bright light, emotional stress and loud noise    Relieved by:  Nothing  Ineffective treatments:  Prescription medications  Associated symptoms: dizziness, nausea, photophobia and vomiting    Associated symptoms: no abdominal pain and no fever        Review of Systems   Constitutional: Negative.  Negative for fever.   Eyes: Positive for photophobia.   Respiratory: Negative.    Cardiovascular: Negative.  Negative for chest pain.   Gastrointestinal: Positive for nausea and vomiting. Negative for abdominal pain.   Endocrine: Negative.    Genitourinary: Negative.  Negative for dysuria.   Skin: Negative.    Neurological: Positive for dizziness and headaches.   Psychiatric/Behavioral: Negative.    All other systems reviewed and are negative.      Past Medical History:   Diagnosis Date   • Abdominal pain    • Abdominal swelling    • Hoyos's disease    • Bipolar 1 disorder    • Brain tumor     R Frontal Lobe per pt   • Brain tumor 2014   • Brain tumor (benign)    • Constipation    • DDD (degenerative disc disease), cervical 05/29/2017   • DDD (degenerative disc disease), cervical    • Diarrhea    • Fibromyalgia    • IBS (irritable bowel syndrome)    • Migraine    • Nausea & vomiting    • PONV (postoperative nausea and vomiting)    • PTSD (post-traumatic stress disorder)    • Rectal bleeding        Allergies   Allergen Reactions   • Ativan [Lorazepam] Hallucinations     confusion   • Sulfa Antibiotics Shortness Of Breath and Swelling   • Compazine [Prochlorperazine Edisylate] Hives   • Demerol [Meperidine] Hives   • Droperidol Itching   • Metoclopramide Swelling    • Toradol [Ketorolac Tromethamine] Hives and Itching   • Zofran [Ondansetron Hcl] Rash       Past Surgical History:   Procedure Laterality Date   • ANAL SCOPE N/A 7/28/2016    Procedure: ANAL SCOPE;  Surgeon: Kael Lopez MD;  Location: [facility] OR;  Service:    • APPENDECTOMY     • COLONOSCOPY N/A 6/30/2016    Procedure: COLONOSCOPY  CPTCODE:21377;  Surgeon: Jose Antonio Belle III, MD;  Location: [facility] OR;  Service:    • COLONOSCOPY N/A 7/7/2016    Procedure: COLONOSCOPY (31548) CPT;  Surgeon: Jose Antonio Belle III, MD;  Location: [facility] OR;  Service:    • CYSTOSCOPY RETROGRADE PYELOGRAM Bilateral 4/28/2017    Procedure: CYSTOSCOPY RETROGRADE PYELOGRAM;  Surgeon: Mu Blunt MD;  Location: [facility] OR;  Service:    • ENDOSCOPY N/A 6/30/2016    Procedure: ESOPHAGOGASTRODUODENOSCOPY WITH BIOPSY  CPTCODE:00484;  Surgeon: Jose Antonio Belle III, MD;  Location: [facility] OR;  Service:    • HEMORRHOIDECTOMY N/A 7/28/2016    Procedure: HEMORRHOID STAPLING;  Surgeon: Kael Lopez MD;  Location: [facility] OR;  Service:    • HYSTERECTOMY     • KNEE SURGERY     • LAPAROSCOPIC SALPINGOOPHERECTOMY     • PORTACATH PLACEMENT N/A 7/28/2017    Procedure: INSERTION OF PORTACATH;  Surgeon: Celso Arredondo MD;  Location: [facility] OR;  Service:    • SHOULDER SURGERY      3 times       Family History   Problem Relation Age of Onset   • Crohn's disease Other    • Hypertension Other    • Diabetes Other    • Irritable bowel syndrome Other        Social History     Social History   • Marital status:      Social History Main Topics   • Smoking status: Former Smoker     Packs/day: 1.00     Years: 20.00     Quit date: 11/1/2015   • Smokeless tobacco: Never Used   • Alcohol use No   • Drug use:      Types: Marijuana      Comment: for pain   • Sexual activity: Defer     Other Topics Concern   • Not on file           Objective   Physical Exam   Constitutional: She is oriented to person, place, and time. She appears  well-developed and well-nourished. No distress.   HENT:   Head: Normocephalic and atraumatic.   Nose: Nose normal.   Eyes: Conjunctivae and EOM are normal. Pupils are equal, round, and reactive to light.   Neck: Normal range of motion. Neck supple. No JVD present. No tracheal deviation present.   Cardiovascular: Normal rate, regular rhythm and normal heart sounds.    No murmur heard.  Pulmonary/Chest: Effort normal and breath sounds normal. No respiratory distress. She has no wheezes.   Abdominal: Soft. Bowel sounds are normal. There is no tenderness.   Musculoskeletal: Normal range of motion. She exhibits no edema or deformity.   Neurological: She is alert and oriented to person, place, and time. No cranial nerve deficit.   Skin: Skin is warm and dry. No rash noted. She is not diaphoretic. No erythema. No pallor.   Psychiatric: She has a normal mood and affect. Her behavior is normal. Thought content normal.   Nursing note and vitals reviewed.      Procedures         ED Course  ED Course   Comment By Time   CT head interpreted by virtual radiology: Infant is small left frontal areas of increased density, possibly artifactual, however consider follow-up CT or MRI to exclude miniscule subarachnoid hemorrhage, felt to be less likely at this juncture. TAMI Weir 03/14 2233   Consulted [facility] neurology, Dr. Howell  We'll examine findings of CT scan, and interpret his results and call us back. TAMI Weir 03/14 2238   Dr. Howell was not able to view CT [facility] 2247   Dr. Howell was able to contact us, and stated that he was able to review CT imaging and is able to rule out a subarachnoid hemorrhage. TAMI Weir 03/14 2302   Dr. Howell will have outpatient follow up with patient tomorrow morning. TAMI Weir 03/14 2328                  MDM  Number of Diagnoses or Management Options  Chronic migraine: established and worsening     Amount  and/or Complexity of Data Reviewed  Clinical lab tests: reviewed  Tests in the radiology section of CPT®: reviewed  Discuss the patient with other providers: yes    Risk of Complications, Morbidity, and/or Mortality  Presenting problems: moderate  Diagnostic procedures: moderate  Management options: low    Patient Progress  Patient progress: stable      Final diagnoses:   Chronic migraine            TAMI Weir  03/15/18 0205

## 2018-03-15 NOTE — SIGNIFICANT NOTE
Dr. Virgen still to see pt. Pt c/o intractable migraine that she is here for. Bolus, phenergan, benadryl cocktail - allergies limit other options.

## 2018-03-16 LAB — POTASSIUM BLD-SCNC: 4.2 MMOL/L (ref 3.5–5.5)

## 2018-03-16 PROCEDURE — 99232 SBSQ HOSP IP/OBS MODERATE 35: CPT | Performed by: INTERNAL MEDICINE

## 2018-03-16 PROCEDURE — 25010000002 ENOXAPARIN PER 10 MG: Performed by: NURSE PRACTITIONER

## 2018-03-16 PROCEDURE — 25010000002 DIHYDROERGOTAMINE MESYLATE PER 1 MG: Performed by: PSYCHIATRY & NEUROLOGY

## 2018-03-16 PROCEDURE — 99253 IP/OBS CNSLTJ NEW/EST LOW 45: CPT | Performed by: PSYCHIATRY & NEUROLOGY

## 2018-03-16 PROCEDURE — 25010000002 PROMETHAZINE PER 50 MG: Performed by: HOSPITALIST

## 2018-03-16 PROCEDURE — 25010000002 BUTORPHANOL PER 1 MG: Performed by: NURSE PRACTITIONER

## 2018-03-16 PROCEDURE — 25010000002 PROMETHAZINE PER 50 MG: Performed by: PSYCHIATRY & NEUROLOGY

## 2018-03-16 PROCEDURE — 25010000002 DEXAMETHASONE PER 1 MG: Performed by: PSYCHIATRY & NEUROLOGY

## 2018-03-16 PROCEDURE — 84132 ASSAY OF SERUM POTASSIUM: CPT | Performed by: HOSPITALIST

## 2018-03-16 PROCEDURE — 63710000001 PROMETHAZINE PER 12.5 MG: Performed by: HOSPITALIST

## 2018-03-16 RX ORDER — DIHYDROERGOTAMINE MESYLATE 1 MG/ML
1 INJECTION, SOLUTION INTRAMUSCULAR; INTRAVENOUS; SUBCUTANEOUS EVERY 8 HOURS SCHEDULED
Status: COMPLETED | OUTPATIENT
Start: 2018-03-16 | End: 2018-03-19

## 2018-03-16 RX ORDER — DEXAMETHASONE SODIUM PHOSPHATE 4 MG/ML
8 INJECTION, SOLUTION INTRA-ARTICULAR; INTRALESIONAL; INTRAMUSCULAR; INTRAVENOUS; SOFT TISSUE
Status: COMPLETED | OUTPATIENT
Start: 2018-03-16 | End: 2018-03-18

## 2018-03-16 RX ORDER — BUTALBITAL, ACETAMINOPHEN AND CAFFEINE 50; 325; 40 MG/1; MG/1; MG/1
1 TABLET ORAL EVERY 4 HOURS PRN
Status: DISCONTINUED | OUTPATIENT
Start: 2018-03-16 | End: 2018-03-19

## 2018-03-16 RX ORDER — PROMETHAZINE HYDROCHLORIDE 25 MG/ML
6.25 INJECTION, SOLUTION INTRAMUSCULAR; INTRAVENOUS EVERY 8 HOURS SCHEDULED
Status: COMPLETED | OUTPATIENT
Start: 2018-03-16 | End: 2018-03-17

## 2018-03-16 RX ORDER — DIVALPROEX SODIUM 250 MG/1
250 TABLET, DELAYED RELEASE ORAL 3 TIMES DAILY
Status: DISCONTINUED | OUTPATIENT
Start: 2018-03-16 | End: 2018-03-22 | Stop reason: HOSPADM

## 2018-03-16 RX ADMIN — DIHYDROERGOTAMINE MESYLATE 1 MG: 1 INJECTION, SOLUTION INTRAMUSCULAR; INTRAVENOUS; SUBCUTANEOUS at 16:05

## 2018-03-16 RX ADMIN — PROMETHAZINE HYDROCHLORIDE 12.5 MG: 12.5 TABLET ORAL at 04:24

## 2018-03-16 RX ADMIN — SODIUM CHLORIDE 100 ML/HR: 9 INJECTION, SOLUTION INTRAVENOUS at 05:48

## 2018-03-16 RX ADMIN — PROMETHAZINE HYDROCHLORIDE 6.25 MG: 25 INJECTION INTRAMUSCULAR; INTRAVENOUS at 16:05

## 2018-03-16 RX ADMIN — SERTRALINE HYDROCHLORIDE 100 MG: 100 TABLET ORAL at 21:30

## 2018-03-16 RX ADMIN — BUSPIRONE HYDROCHLORIDE 15 MG: 15 TABLET ORAL at 21:30

## 2018-03-16 RX ADMIN — BUSPIRONE HYDROCHLORIDE 15 MG: 15 TABLET ORAL at 05:47

## 2018-03-16 RX ADMIN — DEXAMETHASONE SODIUM PHOSPHATE 8 MG: 4 INJECTION, SOLUTION INTRAMUSCULAR; INTRAVENOUS at 16:06

## 2018-03-16 RX ADMIN — POTASSIUM CHLORIDE 40 MEQ: 750 CAPSULE, EXTENDED RELEASE ORAL at 04:16

## 2018-03-16 RX ADMIN — CARBIDOPA AND LEVODOPA 1 TABLET: 10; 100 TABLET ORAL at 21:30

## 2018-03-16 RX ADMIN — BUTALBITAL, ACETAMINOPHEN AND CAFFEINE 1 TABLET: 50; 325; 40 TABLET ORAL at 18:18

## 2018-03-16 RX ADMIN — CETIRIZINE HYDROCHLORIDE 10 MG: 10 TABLET, FILM COATED ORAL at 09:23

## 2018-03-16 RX ADMIN — BUTORPHANOL TARTRATE 2 MG: 2 INJECTION, SOLUTION INTRAMUSCULAR; INTRAVENOUS at 14:03

## 2018-03-16 RX ADMIN — DIVALPROEX SODIUM 250 MG: 250 TABLET, DELAYED RELEASE ORAL at 16:06

## 2018-03-16 RX ADMIN — ENOXAPARIN SODIUM 40 MG: 40 INJECTION SUBCUTANEOUS at 05:47

## 2018-03-16 RX ADMIN — DIVALPROEX SODIUM 250 MG: 250 TABLET, DELAYED RELEASE ORAL at 11:49

## 2018-03-16 RX ADMIN — BUSPIRONE HYDROCHLORIDE 15 MG: 15 TABLET ORAL at 14:00

## 2018-03-16 RX ADMIN — BUTORPHANOL TARTRATE 2 MG: 2 INJECTION, SOLUTION INTRAMUSCULAR; INTRAVENOUS at 09:23

## 2018-03-16 RX ADMIN — BUTORPHANOL TARTRATE 2 MG: 2 INJECTION, SOLUTION INTRAMUSCULAR; INTRAVENOUS at 20:09

## 2018-03-16 RX ADMIN — DIHYDROERGOTAMINE MESYLATE 1 MG: 1 INJECTION, SOLUTION INTRAMUSCULAR; INTRAVENOUS; SUBCUTANEOUS at 21:29

## 2018-03-16 RX ADMIN — PROMETHAZINE HYDROCHLORIDE 12.5 MG: 25 INJECTION INTRAMUSCULAR; INTRAVENOUS at 09:23

## 2018-03-16 RX ADMIN — DIVALPROEX SODIUM 250 MG: 250 TABLET, DELAYED RELEASE ORAL at 21:30

## 2018-03-16 RX ADMIN — BUTORPHANOL TARTRATE 2 MG: 2 INJECTION, SOLUTION INTRAMUSCULAR; INTRAVENOUS at 04:17

## 2018-03-16 RX ADMIN — CARBIDOPA AND LEVODOPA 1 TABLET: 10; 100 TABLET ORAL at 05:47

## 2018-03-16 RX ADMIN — SERTRALINE HYDROCHLORIDE 100 MG: 100 TABLET ORAL at 09:23

## 2018-03-16 RX ADMIN — POTASSIUM CHLORIDE 40 MEQ: 750 CAPSULE, EXTENDED RELEASE ORAL at 00:22

## 2018-03-16 RX ADMIN — SODIUM CHLORIDE 100 ML/HR: 9 INJECTION, SOLUTION INTRAVENOUS at 16:05

## 2018-03-16 RX ADMIN — PROMETHAZINE HYDROCHLORIDE 6.25 MG: 25 INJECTION INTRAMUSCULAR; INTRAVENOUS at 21:30

## 2018-03-16 NOTE — PROGRESS NOTES
"    [facility] Medicine Services  PROGRESS NOTE    Patient Name: Susanna Camilo  : 1975  MRN: [id number]    Date of Admission: 3/15/2018  Length of Stay: 1  Primary Care Physician: MIGDALIA Gooden    Subjective   Subjective     CC:  F/u HA    HPI:  Still complains of severe HA and neck pain \"worse than 10 out of 10\".  Nausea is improved and is tolerating some food.  No other complaints or issues overnight.    Review of Systems   Constitutional: Negative for fever.   Respiratory: Negative for shortness of breath.    Cardiovascular: Negative for chest pain.   Gastrointestinal: Negative for abdominal pain.   Musculoskeletal: Positive for neck pain.   Neurological: Positive for headaches.   All other systems reviewed and are negative.    Otherwise ROS is negative except as mentioned in the HPI.    Objective   Objective     Vital Signs:   Temp:  [97.6 °F (36.4 °C)-98.1 °F (36.7 °C)] 98.1 °F (36.7 °C)  Heart Rate:  [52-69] 69  Resp:  [18-20] 18  BP: (109-139)/(56-88) 122/56        Physical Exam:  Constitutional: No acute distress, awake, alert  HENT: NCAT, mucous membranes moist  Respiratory: Clear to auscultation bilaterally, respiratory effort normal   Cardiovascular: RRR, no murmurs, rubs, or gallops, palpable pedal pulses bilaterally  Gastrointestinal: Positive bowel sounds, soft, nontender, nondistended  Musculoskeletal: No bilateral ankle edema  Psychiatric: Appropriate affect, cooperative  Neurologic: Oriented x 3, strength symmetric in all extremities, Cranial Nerves grossly intact to confrontation, speech clear. +HA with movement she says.  Some photophobia  Skin: No rashes      Results Reviewed:  I have personally reviewed current lab, radiology, and data and agree.      Results from last 7 days  Lab Units 03/15/18  1713 18  2043   WBC 10*3/mm3 5.57 8.17   HEMOGLOBIN g/dL 11.5 12.0   HEMATOCRIT % 35.2 36.2*   PLATELETS 10*3/mm3 116* 144       Results from last 7 " days  Lab Units 03/16/18  0556 03/15/18  1713 03/14/18  2043   SODIUM mmol/L  --  140 141   POTASSIUM mmol/L 4.2 3.1* 3.6   CHLORIDE mmol/L  --  108 110   CO2 mmol/L  --  24.0 25.4   BUN mg/dL  --  6* 8   CREATININE mg/dL  --  0.60 0.48   GLUCOSE mg/dL  --  112* 95   CALCIUM mg/dL  --  8.2* 9.2   ALT (SGPT) U/L  --  53* 54*   AST (SGOT) U/L  --  39* 37*     Estimated Creatinine Clearance: 141 mL/min (by C-G formula based on SCr of 0.6 mg/dL).  No results found for: BNP  No results found for: PHART    Microbiology Results Abnormal     None          Imaging Results (last 24 hours)     ** No results found for the last 24 hours. **             I have reviewed the medications.    Assessment/Plan   Assessment / Plan     Hospital Problem List     * (Principal)Intractable headache    Nausea and vomiting    History of migraine headaches    Benign brain tumor    Fibromyalgia        Brief Hospital Course to date:  Susanna Camilo is a 43 y.o. female history of chronic migraines and fibromyalgia presents to [facility] emergency room with complaints of intractable headache.  CT scan showed stable meningioma.  Transferred to [place] for possible DHE protocol.      Assessment & Plan:  - Dr. Key to see today and anticipate DHE protocol.  Got IV depakote last night.  - LUIS ALBERTO reviewed.  Multiple lortab/percocets scripts for chronic pain and stadol as well.  - K+ replaced  - home when able    DVT Prophylaxis:  lovenox    CODE STATUS: Full Code    Disposition: I expect the patient to be discharged TBD    Electronically signed by Geoffrey Trejo MD, 03/16/18, 10:59 AM.

## 2018-03-16 NOTE — CONSULTS
Neurology    Referring provider:   Bebeto Howell MD  [address]  [address]  [place] [address]    Reason for Consultation: Status migraine    Chief complaint: 6 days of headache    History of present illness:  This 43-year-old woman is sent to evaluation for ongoing migraine headaches.    She is been seen by me on previous admission and has been treated with DHE at.    She is allergic to Reglan.    She's had a headache now for 6 days.  She says head with throbbing, photophobia, neck pain, and scalp tenderness.    She has photophobia and sonophobia.    She is able to little bit with the antibiotics that she is getting here.    She's been doing well previously on the Depakote at 250 mg 3 times daily.    She's been treated with levodopa for proper.    The tremor is actually oriented.        Review of Systems: All systems reviewed and other than the history of present illness and past medical history negative    Home meds:   Facility-Administered Medications Prior to Admission   Medication Dose Route Frequency Provider Last Rate Last Dose   • gentamicin (GARAMYCIN) injection 80 mg  80 mg Intramuscular Once Mu Blunt MD         Prescriptions Prior to Admission   Medication Sig Dispense Refill Last Dose   • azelastine (ASTEPRO) 0.15 % solution nasal spray 2 sprays into each nostril 2 (Two) Times a Day. 30 mL 0 Taking   • azithromycin (ZITHROMAX) 250 MG tablet Take 2 tablets the first day, then 1 tablet daily for 4 days. 6 tablet 0    • benzonatate (TESSALON) 200 MG capsule Take 1 capsule by mouth 3 (Three) Times a Day As Needed for Cough. 30 capsule 0 Taking   • busPIRone (BUSPAR) 15 MG tablet Take 15 mg by mouth 3 (three) times a day      Taking   • butorphanol (STADOL) 10 MG/ML nasal spray 1 spray into each nostril Daily.   Taking   • carbidopa-levodopa (SINEMET)  MG per tablet Take 1 tablet by mouth 3 (Three) Times a Day.   Taking   • cetirizine (zyrTEC) 10 MG tablet Take 1 tablet by  mouth Daily. 30 tablet 0 Taking   • dicyclomine (BENTYL) 20 MG tablet Take 1 tablet by mouth Every 6 (Six) Hours As Needed (Abdominal Cramping). 10 tablet 0    • diflunisal (DOLOBID) 500 MG tablet tablet Take 1 tablet by mouth 2 (Two) Times a Day. 20 tablet 0    • divalproex (DEPAKOTE) 500 MG DR tablet Take 250 mg by mouth 3 (Three) Times a Day.   Taking   • famotidine (PEPCID) 40 MG tablet Take 40 mg by mouth 2 (Two) Times a Day As Needed for heartburn.   Taking   • guaiFENesin (MUCINEX) 600 MG 12 hr tablet Take 2 tablets by mouth 2 (Two) Times a Day. 20 tablet 0 Taking   • guaifenesin-dextromethorphan (MUCINEX DM)  MG tablet sustained-release 12 hour tablet Take 1-2 tablets by mouth 2 (Two) Times a Day As Needed (cough/congestion). 40 tablet 0    • HYDROcodone-acetaminophen (NORCO) 7.5-325 MG per tablet Take 1 tablet by mouth Every 6 (Six) Hours As Needed for Moderate Pain (4-6).   Taking   • MethylPREDNISolone (MEDROL, BRET,) 4 MG tablet Take as directed on package instructions. 21 each 0    • oxybutynin (DITROPAN) 5 MG tablet Take 1 tablet by mouth 3 (Three) Times a Day. 90 tablet 11 Taking   • oxyCODONE-acetaminophen (PERCOCET)  MG per tablet Take 1 tablet by mouth Every 6 (Six) Hours As Needed for Moderate Pain . 12 tablet 0    • oxyCODONE-acetaminophen (PERCOCET) 5-325 MG per tablet 1 to 2 Tablets Every 6 Hours as needed for PAIN 20 tablet 0    • oxyCODONE-acetaminophen (PERCOCET) 5-325 MG per tablet 1 to 2 Tablets Every 6 Hours as needed for PAIN 20 tablet 0    • pantoprazole (PROTONIX) 40 MG EC tablet Take 40 mg by mouth Daily As Needed.   Taking   • polyethylene glycol (GoLYTELY) 236 g solution Starting at 6 pm on day prior to procedure, drink 8 ounces every 15 minutes until all gone or stools are clear. May add flavor packet. 4000 mL 0 Taking   • potassium chloride (K-DUR) 10 MEQ CR tablet Take 1 tablet by mouth Daily. 12 tablet 0 Taking   • promethazine (PHENERGAN) 25 MG tablet Take 1 tablet  by mouth Every 6 (Six) Hours As Needed for Nausea or Vomiting. 30 tablet 0 Taking   • sertraline (ZOLOFT) 100 MG tablet Take 100 mg by mouth 2 (Two) Times a Day.   Taking   • SUMAtriptan (IMITREX) 6 MG/0.5ML solution injection Inject 6 mg under the skin 1 (One) Time.   Taking       History  Past Medical History:   Diagnosis Date   • Abdominal pain    • Abdominal swelling    • Hoyos's disease    • Bipolar 1 disorder    • Brain tumor     R Frontal Lobe per pt   • Brain tumor 2014   • Brain tumor (benign)    • Constipation    • DDD (degenerative disc disease), cervical 05/29/2017   • DDD (degenerative disc disease), cervical    • Diarrhea    • Fibromyalgia    • IBS (irritable bowel syndrome)    • Migraine    • Nausea & vomiting    • PONV (postoperative nausea and vomiting)    • PTSD (post-traumatic stress disorder)    • Rectal bleeding    ,   Past Surgical History:   Procedure Laterality Date   • ANAL SCOPE N/A 7/28/2016    Procedure: ANAL SCOPE;  Surgeon: Kael Lopez MD;  Location: [facility] OR;  Service:    • APPENDECTOMY     • COLONOSCOPY N/A 6/30/2016    Procedure: COLONOSCOPY  CPTCODE:56705;  Surgeon: Jose Antonio Belle III, MD;  Location: [facility] OR;  Service:    • COLONOSCOPY N/A 7/7/2016    Procedure: COLONOSCOPY (18931) CPT;  Surgeon: Jose Antonio Belle III, MD;  Location: [facility] OR;  Service:    • CYSTOSCOPY RETROGRADE PYELOGRAM Bilateral 4/28/2017    Procedure: CYSTOSCOPY RETROGRADE PYELOGRAM;  Surgeon: Mu Blunt MD;  Location: [facility] OR;  Service:    • ENDOSCOPY N/A 6/30/2016    Procedure: ESOPHAGOGASTRODUODENOSCOPY WITH BIOPSY  CPTCODE:17431;  Surgeon: Jose Antonio Belle III, MD;  Location: [facility] OR;  Service:    • HEMORRHOIDECTOMY N/A 7/28/2016    Procedure: HEMORRHOID STAPLING;  Surgeon: Kael Lopez MD;  Location: [facility] OR;  Service:    • HYSTERECTOMY     • KNEE SURGERY     • LAPAROSCOPIC SALPINGOOPHERECTOMY     • PORTACATH PLACEMENT N/A 7/28/2017    Procedure: INSERTION OF  "MARELY;  Surgeon: Celso Arredondo MD;  Location: [facility];  Service:    • SHOULDER SURGERY      3 times   ,   Family History   Problem Relation Age of Onset   • Crohn's disease Other    • Hypertension Other    • Diabetes Other    • Irritable bowel syndrome Other    ,   Social History   Substance Use Topics   • Smoking status: Former Smoker     Packs/day: 1.00     Years: 20.00     Quit date: 11/1/2015   • Smokeless tobacco: Never Used   • Alcohol use No    and Allergies:  Ativan [lorazepam]; Sulfa antibiotics; Compazine [prochlorperazine edisylate]; Demerol [meperidine]; Droperidol; Metoclopramide; Toradol [ketorolac tromethamine]; and Zofran [ondansetron hcl],    Vital Signs   Blood pressure 103/57, pulse 67, temperature 98.2 °F (36.8 °C), temperature source Oral, resp. rate 17, height 175.3 cm (69.02\"), weight 73.9 kg (163 lb), SpO2 95 %, not currently breastfeeding.  Body mass index is 24.06 kg/m².    Physical Exam:   General: Pleasant and alert              Neck: Tenderness to palpation no bruits              Resp: Normal breath sounds              Cor: Regular rhythm              Extr: No edema              Skin: Warm and dry               Neuro: Mentally alert and oriented with normal memory attention and concentration.    Speech is articulate with no word finding problems.    Scalp is tender to palpation.    Coordination is normal finger to nose testing.    Pupils are equal and reactive.    Facial movement sensation are normal.    Palate elevates normally tongue protrudes normally.    Reflexes are 1+ and equal bilaterally.    Motor testing shows normal power and tone.    Sensory testing is normal.        Results Review: CT of the brain shows a left frontal meningioma, small.    Labs:  Lab Results (last 72 hours)     Procedure Component Value Units Date/Time    Potassium [[id number]]  (Normal) Collected:  03/16/18 0556    Specimen:  Blood Updated:  03/16/18 0724     Potassium 4.2 mmol/L      Comment: " Verified by repeat analysis.        CBC Auto Differential [[id number]]  (Abnormal) Collected:  03/15/18 1713    Specimen:  Blood Updated:  03/15/18 1810     WBC 5.57 10*3/mm3      RBC 4.03 10*6/mm3      Hemoglobin 11.5 g/dL      Hematocrit 35.2 %      MCV 87.3 fL      MCH 28.5 pg      MCHC 32.7 g/dL      RDW 14.1 %      RDW-SD 44.6 fl      MPV 11.3 fL      Platelets 116 (L) 10*3/mm3      Neutrophil % 36.9 (L) %      Lymphocyte % 55.1 (H) %      Monocyte % 5.6 %      Eosinophil % 2.2 %      Basophil % 0.2 %      Immature Grans % 0.0 %      Neutrophils, Absolute 2.06 10*3/mm3      Lymphocytes, Absolute 3.07 10*3/mm3      Monocytes, Absolute 0.31 10*3/mm3      Eosinophils, Absolute 0.12 10*3/mm3      Basophils, Absolute 0.01 10*3/mm3      Immature Grans, Absolute 0.00 10*3/mm3     Scan Slide [[id number]]  (Normal) Collected:  03/15/18 1713    Specimen:  Blood Updated:  03/15/18 1810     RBC Morphology Normal     WBC Morphology Normal     Platelet Morphology Normal    Comprehensive Metabolic Panel [[id number]]  (Abnormal) Collected:  03/15/18 1713    Specimen:  Blood Updated:  03/15/18 1743     Glucose 112 (H) mg/dL      BUN 6 (L) mg/dL      Creatinine 0.60 mg/dL      Sodium 140 mmol/L      Potassium 3.1 (L) mmol/L      Chloride 108 mmol/L      CO2 24.0 mmol/L      Calcium 8.2 (L) mg/dL      Total Protein 6.0 g/dL      Albumin 3.70 g/dL      ALT (SGPT) 53 (H) U/L      AST (SGOT) 39 (H) U/L      Alkaline Phosphatase 61 U/L      Total Bilirubin 0.4 mg/dL      eGFR Non African Amer 109 mL/min/1.73      Globulin 2.3 gm/dL      A/G Ratio 1.6 g/dL      BUN/Creatinine Ratio 10.0     Anion Gap 8.0 mmol/L     Narrative:       [employer] Guidelines    Stage     Description        GFR  1         Normal or High     90+  2         Mild decrease      60-89  3         Moderate decrease  30-59  4         Severe decrease    15-29  5         Kidney failure     <15          Rads:  Imaging Results (last 72 hours)     ** No  results found for the last 72 hours. **            Assessment: Status migraine    Tremor secondary to Depakote       Plan:    Continue to get Depakote the has previously.    To[place]ito protocol with DHE 1 mg every 8 hours for 9 doses preceded by Phenergan 6.25 mg IV for the first 3 doses.    Decadron 10 mg IV daily ×3 doses.        Comment:   Patient has responded well to DHE in the past and tolerates it without difficulty.    The tremor is Depakote related and is not likely to benefit from levodopa.    I discussed the patients findings and my recommendations with patient and family      Sathish Key MD  03/16/18  2:03 PM

## 2018-03-16 NOTE — PAYOR COMM NOTE
"Lili Dave RN Utilization Review [phone]  Fax # [phone]        Notification of admission and initial clinicals.  Status is inpatient as of 03/15/18 @ 1703.  Jes Darden (43 y.o. Female)     Date of Birth Social Security Number Address Home Phone MRN    1975  [address]  [facility] [phone] [id number]    Mandaen Marital Status          None        Admission Date Admission Type Admitting Provider Attending Provider Department, Room/Bed    3/15/18 Urgent Geoffrey Trejo MD Dossett, Lee M, MD [address] GYN, N540/1    Discharge Date Discharge Disposition Discharge Destination                       Attending Provider:  Geoffrey Trejo MD    Allergies:  Ativan [Lorazepam], Sulfa Antibiotics, Compazine [Prochlorperazine Edisylate], Demerol [Meperidine], Droperidol, Metoclopramide, Toradol [Ketorolac Tromethamine], Zofran [Ondansetron Hcl]    Isolation:  None   Infection:  None   Code Status:  FULL    Ht:  175.3 cm (69.02\")   Wt:  73.9 kg (163 lb)    Admission Cmt:  None   Principal Problem:  Intractable headache [R51]                 Active Insurance as of 3/15/2018     Primary Coverage     Payor Plan Insurance Group Employer/Plan Group    [facility]      Payor Plan Address Payor Plan Phone Number Effective From Effective To    [address] [address]  [address]     [place] [address]       Subscriber Name Subscriber Birth Date Member ID       JES DARDEN 1975 [id number]                 Emergency Contacts      (Rel.) Home Phone Work Phone Mobile Phone    Odin Cardoza (Spouse) -- -- [phone]               History & Physical      Sangeeta Virgen MD at 3/15/2018  4:22 PM              [facility] Medicine Services  HISTORY AND PHYSICAL    Patient Name: Jes Darden  : 1975  MRN: [id number]  Primary Care Physician: MIGDALIA Gooden    Subjective   Subjective "     Chief Complaint:  headache    HPI:  Susanna Camilo is a 43 y.o. female with past medical history of fibromyalgia, IBS, Crohn's, benign brain tumor, Hoyos's disease, urethral stenosis and migraines since age 19.  She presented to [facility] ED yesterday with c/o headache.  CT scan was performed and was interpreted as being possible subarachnoid hemorrhage.  Dr. Howell was notified and reviewed the CT scan and found no evidence of hemorrhage.  She has a small asymptomatic meningioma in the right frontal area which as been followed by neurology.  She was sent home and had a follow up this morning in the neurosurgery clinic.  Decision was made to have patient admitted to the hospital for acute migraine headache with associated nausea and vomiting with neurology consult.  Patient states headache is 7/10 and worse with light exposure and position changing.  She also c/o slight blurry vision, neck soreness, dizziness, and pre-syncope episode yesterday.  Patient will be admitted to the hospital medicine service for further evaluation and management.      Review of Systems   Constitutional: Positive for appetite change. Negative for chills and fever.   HENT: Negative for congestion, sneezing and sore throat.    Eyes: Negative.    Respiratory: Negative for cough, shortness of breath and wheezing.    Cardiovascular: Negative for chest pain, palpitations and leg swelling.   Gastrointestinal: Positive for nausea and vomiting. Negative for abdominal pain, constipation and diarrhea.   Endocrine: Negative.    Genitourinary: Negative for dysuria and urgency.   Musculoskeletal: Positive for neck pain.   Skin: Negative.    Neurological: Positive for dizziness, weakness and headaches. Negative for speech difficulty.   Psychiatric/Behavioral: Negative for confusion and decreased concentration.        Otherwise 10-system ROS reviewed and is negative except as mentioned in the HPI.    Personal History     Past Medical History:   Diagnosis  Date   • Abdominal pain    • Abdominal swelling    • Hoyos's disease    • Bipolar 1 disorder    • Brain tumor     R Frontal Lobe per pt   • Brain tumor 2014   • Brain tumor (benign)    • Constipation    • DDD (degenerative disc disease), cervical 05/29/2017   • DDD (degenerative disc disease), cervical    • Diarrhea    • Fibromyalgia    • IBS (irritable bowel syndrome)    • Migraine    • Nausea & vomiting    • PONV (postoperative nausea and vomiting)    • PTSD (post-traumatic stress disorder)    • Rectal bleeding        Past Surgical History:   Procedure Laterality Date   • ANAL SCOPE N/A 7/28/2016    Procedure: ANAL SCOPE;  Surgeon: Kael Lopez MD;  Location: [facility] OR;  Service:    • APPENDECTOMY     • COLONOSCOPY N/A 6/30/2016    Procedure: COLONOSCOPY  CPTCODE:48790;  Surgeon: Jose Antonio Belle III, MD;  Location: [facility] OR;  Service:    • COLONOSCOPY N/A 7/7/2016    Procedure: COLONOSCOPY (93237) CPT;  Surgeon: Jose Antonio Belle III, MD;  Location: [facility] OR;  Service:    • CYSTOSCOPY RETROGRADE PYELOGRAM Bilateral 4/28/2017    Procedure: CYSTOSCOPY RETROGRADE PYELOGRAM;  Surgeon: Mu Blunt MD;  Location: [facility] OR;  Service:    • ENDOSCOPY N/A 6/30/2016    Procedure: ESOPHAGOGASTRODUODENOSCOPY WITH BIOPSY  CPTCODE:64353;  Surgeon: Jose Antonio Belle III, MD;  Location: [facility] OR;  Service:    • HEMORRHOIDECTOMY N/A 7/28/2016    Procedure: HEMORRHOID STAPLING;  Surgeon: Kael Lopez MD;  Location: [facility] OR;  Service:    • HYSTERECTOMY     • KNEE SURGERY     • LAPAROSCOPIC SALPINGOOPHERECTOMY     • PORTACATH PLACEMENT N/A 7/28/2017    Procedure: INSERTION OF PORTACATH;  Surgeon: Celso Arredondo MD;  Location: [facility] OR;  Service:    • SHOULDER SURGERY      3 times     Family History: family history includes Crohn's disease in her other; Diabetes in her other; Hypertension in her other; Irritable bowel syndrome in her other.     Social History:  reports that she quit smoking about  2 years ago. She has a 20.00 pack-year smoking history. She has never used smokeless tobacco. She reports that she uses drugs, including Marijuana. She reports that she does not drink alcohol.  Social History     Social History Narrative   • No narrative on file       Medications:  Facility-Administered Medications Prior to Admission   Medication Dose Route Frequency Provider Last Rate Last Dose   • gentamicin (GARAMYCIN) injection 80 mg  80 mg Intramuscular Once Mu Blunt MD         Prescriptions Prior to Admission   Medication Sig Dispense Refill Last Dose   • azelastine (ASTEPRO) 0.15 % solution nasal spray 2 sprays into each nostril 2 (Two) Times a Day. 30 mL 0 Taking   • azithromycin (ZITHROMAX) 250 MG tablet Take 2 tablets the first day, then 1 tablet daily for 4 days. 6 tablet 0    • benzonatate (TESSALON) 200 MG capsule Take 1 capsule by mouth 3 (Three) Times a Day As Needed for Cough. 30 capsule 0 Taking   • busPIRone (BUSPAR) 15 MG tablet Take 15 mg by mouth 3 (three) times a day      Taking   • butorphanol (STADOL) 10 MG/ML nasal spray 1 spray into each nostril Daily.   Taking   • carbidopa-levodopa (SINEMET)  MG per tablet Take 1 tablet by mouth 3 (Three) Times a Day.   Taking   • cetirizine (zyrTEC) 10 MG tablet Take 1 tablet by mouth Daily. 30 tablet 0 Taking   • dicyclomine (BENTYL) 20 MG tablet Take 1 tablet by mouth Every 6 (Six) Hours As Needed (Abdominal Cramping). 10 tablet 0    • diflunisal (DOLOBID) 500 MG tablet tablet Take 1 tablet by mouth 2 (Two) Times a Day. 20 tablet 0    • divalproex (DEPAKOTE) 500 MG DR tablet Take 250 mg by mouth 3 (Three) Times a Day.   Taking   • famotidine (PEPCID) 40 MG tablet Take 40 mg by mouth 2 (Two) Times a Day As Needed for heartburn.   Taking   • guaiFENesin (MUCINEX) 600 MG 12 hr tablet Take 2 tablets by mouth 2 (Two) Times a Day. 20 tablet 0 Taking   • guaifenesin-dextromethorphan (MUCINEX DM)  MG tablet sustained-release 12 hour  tablet Take 1-2 tablets by mouth 2 (Two) Times a Day As Needed (cough/congestion). 40 tablet 0    • HYDROcodone-acetaminophen (NORCO) 7.5-325 MG per tablet Take 1 tablet by mouth Every 6 (Six) Hours As Needed for Moderate Pain (4-6).   Taking   • MethylPREDNISolone (MEDROL, BRET,) 4 MG tablet Take as directed on package instructions. 21 each 0    • oxybutynin (DITROPAN) 5 MG tablet Take 1 tablet by mouth 3 (Three) Times a Day. 90 tablet 11 Taking   • oxyCODONE-acetaminophen (PERCOCET)  MG per tablet Take 1 tablet by mouth Every 6 (Six) Hours As Needed for Moderate Pain . 12 tablet 0    • oxyCODONE-acetaminophen (PERCOCET) 5-325 MG per tablet 1 to 2 Tablets Every 6 Hours as needed for PAIN 20 tablet 0    • oxyCODONE-acetaminophen (PERCOCET) 5-325 MG per tablet 1 to 2 Tablets Every 6 Hours as needed for PAIN 20 tablet 0    • pantoprazole (PROTONIX) 40 MG EC tablet Take 40 mg by mouth Daily As Needed.   Taking   • polyethylene glycol (GoLYTELY) 236 g solution Starting at 6 pm on day prior to procedure, drink 8 ounces every 15 minutes until all gone or stools are clear. May add flavor packet. 4000 mL 0 Taking   • potassium chloride (K-DUR) 10 MEQ CR tablet Take 1 tablet by mouth Daily. 12 tablet 0 Taking   • promethazine (PHENERGAN) 25 MG tablet Take 1 tablet by mouth Every 6 (Six) Hours As Needed for Nausea or Vomiting. 30 tablet 0 Taking   • sertraline (ZOLOFT) 100 MG tablet Take 100 mg by mouth 2 (Two) Times a Day.   Taking   • SUMAtriptan (IMITREX) 6 MG/0.5ML solution injection Inject 6 mg under the skin 1 (One) Time.   Taking       Allergies   Allergen Reactions   • Ativan [Lorazepam] Hallucinations     confusion   • Sulfa Antibiotics Shortness Of Breath and Swelling   • Compazine [Prochlorperazine Edisylate] Hives   • Demerol [Meperidine] Hives   • Droperidol Itching   • Metoclopramide Swelling   • Toradol [Ketorolac Tromethamine] Hives and Itching   • Zofran [Ondansetron Hcl] Rash       Objective   Objective      Vital Signs:   Temp:  [97.6 °F (36.4 °C)] 97.6 °F (36.4 °C)  Heart Rate:  [55-61] 55  Resp:  [20] 20  BP: (114-139)/(75-88) 139/88        Physical Exam   Constitutional: No acute distress, awake, alert  Eyes: PERRLA, sclerae anicteric, no conjunctival injection  HENT: NCAT, mucous membranes moist  Neck: Supple, no thyromegaly, no lymphadenopathy, trachea midline  Respiratory: Clear to auscultation bilaterally, nonlabored respirations   Cardiovascular: RRR, no murmurs, rubs, or gallops, palpable pedal pulses bilaterally  Gastrointestinal: Positive bowel sounds, soft, nontender, nondistended  Musculoskeletal: No bilateral ankle edema, no clubbing or cyanosis to extremities  Psychiatric: Appropriate affect, cooperative  Neurologic: Oriented x 3, strength symmetric in all extremities, Cranial Nerves grossly intact to confrontation, speech clear  Skin: No rashes    Results Reviewed:  I have personally reviewed current lab, radiology, and data and agree.      Results from last 7 days  Lab Units 03/14/18  2043   WBC 10*3/mm3 8.17   HEMOGLOBIN g/dL 12.0   HEMATOCRIT % 36.2*   PLATELETS 10*3/mm3 144       Results from last 7 days  Lab Units 03/14/18  2043   SODIUM mmol/L 141   POTASSIUM mmol/L 3.6   CHLORIDE mmol/L 110   CO2 mmol/L 25.4   BUN mg/dL 8   CREATININE mg/dL 0.48   GLUCOSE mg/dL 95   CALCIUM mg/dL 9.2   ALT (SGPT) U/L 54*   AST (SGOT) U/L 37*     Estimated Creatinine Clearance: 176.3 mL/min (by C-G formula based on SCr of 0.48 mg/dL).  Brief Urine Lab Results  (Last result in the past 365 days)      Color   Clarity   Blood   Leuk Est   Nitrite   Protein   CREAT   Urine HCG        03/14/18 2221 Yellow Clear Negative Trace(A) Negative Negative         03/14/18 2221               Negative         No results found for: BNP  No results found for: PHART  Imaging Results (last 24 hours)     ** No results found for the last 24 hours. **          Assessment/Plan   Assessment / Plan     Hospital Problem List      Intractable headache    Nausea and vomiting    History of migraine headaches    Benign brain tumor    Fibromyalgia          Assessment & Plan:    --consult neurology  --migraine treatment with plans for neurology to see patient in the AM, patient with multiple allergies, will try IVF's, phenergan and Fioricet PRN  --defer further imaging to neurology  --clear liquid diet and advance as tolerated  --continue home meds    DVT prophylaxis:  Lovenox, TEDs/SCDs    CODE STATUS:  Full Code    Admission Status:  I believe this patient meets INPATIENT status due to the need for care which can only be reasonably provided in an hospital setting such as aggressive/expedited ancillary services and/or consultation services, the necessity for IV medications, close physician monitoring and/or the possible need for procedures.  In such, I feel patient’s risk for adverse outcomes and need for care warrant INPATIENT evaluation and predict the patient’s care encounter to likely last beyond 2 midnights.    Electronically signed by MIGDALIA Abraham, 03/15/18, 4:22 PM.      Brief Attending Admission Attestation     I have seen and examined the patient, performing an independent face-to-face diagnostic evaluation with plan of care reviewed and developed with the advanced practice clinician (APC).      Brief Summary Statement/HPI:   Susanna Camilo is a 43 y.o. female with migraine was transferred from Dr. Soto's office for DHE protocol. Currently having a migraine ongoing for 6 days. Has previously tried everything. Only DHE and botox works. +Nausea, +photosensitivity.      Attending Physical Exam:  Constitutional: No acute distress, awake, alert on RA  Eyes: PERRLA, sclerae anicteric, no conjunctival injection  HENT: NCAT, mucous membranes moist  Neck: Supple, no thyromegaly, no lymphadenopathy, trachea midline  Respiratory: Clear to auscultation bilaterally, nonlabored respirations   Cardiovascular: RRR, no murmurs, rubs, or gallops,  palpable pedal pulses bilaterally  Gastrointestinal: Positive bowel sounds, soft, nontender, nondistended  Musculoskeletal: No bilateral ankle edema, no clubbing or cyanosis to extremities  Psychiatric: Appropriate affect, cooperative  Neurologic: Oriented x 3, strength symmetric in all extremities, Cranial Nerves grossly intact to confrontation, speech clear  Skin: No rashes    Brief Assessment/Plan :  See above for further detailed assessment and plan developed with APC which I have reviewed and/or edited.    - Intractable migraines: Migraine cocktails with IVFs, anti-emetics. 1x stadol dose tonight. Pt does not appear to be a drug seeker. Obtain [facility]. Neuro to order DHE protocol in AM. All head imaging negative for acute abnormalities. Pt has had LP 2 yrs prior for possible idiopathic intracranial hypertension and was reportedly neg. Discussed with Neuro-on-call. Will load with 1x Depakote 1g IV    Electronically signed by Sangeeta Virgen MD, 03/15/18, 10:27 PM.             Electronically signed by Sangeeta Virgen MD at 3/15/2018 10:33 PM             ICU Vital Signs     Row Name 03/16/18 0800 03/16/18 0706 03/16/18 0600 03/16/18 0500 03/16/18 0416       Vitals    Temp  -- 98.1 °F (36.7 °C)  --  --  --    Temp src  -- Oral  --  --  --    Pulse  -- 69  --  --  --    Heart Rate Source  -- Monitor  --  --  --    Resp  -- 18  --  --  --    Resp Rate Source  -- Visual  --  --  --    BP  -- 122/56  --  --  --       Oxygen Therapy    SpO2  -- 95 %  --  --  --    Pulse Oximetry Type  -- Intermittent  --  --  --    Device (Oxygen Therapy) room air room air room air room air room air    Row Name 03/16/18 0350 03/16/18 0200 03/16/18 0012 03/15/18 2200 03/15/18 2019       Vitals    Temp 97.7 °F (36.5 °C)  -- 97.8 °F (36.6 °C)  -- 97.7 °F (36.5 °C)    Temp src Oral  -- Oral  -- Oral    Pulse 57  -- 60  -- 52    Heart Rate Source Monitor  -- Monitor  -- Monitor    Resp 18  -- 18  -- 18    Resp Rate Source Visual  -- Visual  --  "Visual    /72  -- 110/76  -- 109/62    BP Location Left arm  -- Left arm  -- Right arm    BP Method Automatic  -- Automatic  -- Automatic    Patient Position Lying  -- Lying  -- Lying       Oxygen Therapy    SpO2 100 %  -- 100 %  -- 100 %    Device (Oxygen Therapy) room air room air room air room air room air    Row Name 03/15/18 2000 03/15/18 1530 03/15/18 1524             Height and Weight    Height  --  -- 175.3 cm (69.02\")      Height Method  --  -- Stated      Weight  --  -- 73.9 kg (163 lb)      Weight Method  --  -- Stated      BSA (Calculated - sq m)  --  -- 1.89 sq meters      BMI (Calculated)  --  -- 24.1      Weight in (lb) to have BMI = 25  --  -- 169         Vitals    Temp  --  -- 97.6 °F (36.4 °C)      Temp src  --  -- Oral      Pulse  --  -- 55      Heart Rate Source  --  -- Monitor      Resp  --  -- 20      Resp Rate Source  --  -- Visual      BP  --  -- 139/88      BP Location  --  -- Right arm      BP Method  --  -- Automatic      Patient Position  --  -- Lying         Oxygen Therapy    SpO2  --  -- 100 %      Pulse Oximetry Type  --  -- Intermittent      Device (Oxygen Therapy) room air room air room air          [facility] Medications (active)       Dose Frequency Start End    busPIRone (BUSPAR) tablet 15 mg 15 mg Every 8 Hours Scheduled 3/15/2018     Sig - Route: Take 1 tablet by mouth Every 8 (Eight) Hours. - Oral    butalbital-acetaminophen-caffeine (FIORICET, ESGIC) -40 MG per tablet 1 tablet 1 tablet Every 4 Hours PRN 3/16/2018     Sig - Route: Take 1 tablet by mouth Every 4 (Four) Hours As Needed for Headache or Migraine. - Oral    butorphanol (STADOL) injection 2 mg 2 mg Once 3/15/2018 3/15/2018    Sig - Route: Infuse 1 mL into a venous catheter 1 (One) Time. - Intravenous    butorphanol (STADOL) injection 2 mg 2 mg Once 3/16/2018 3/16/2018    Sig - Route: Infuse 1 mL into a venous catheter 1 (One) Time. - Intravenous    butorphanol (STADOL) injection 2 mg 2 mg Every 4 Hours PRN " "3/16/2018     Sig - Route: Infuse 1 mL into a venous catheter Every 4 (Four) Hours As Needed for Moderate Pain  or Severe Pain . - Intravenous    carbidopa-levodopa (SINEMET)  MG per tablet 1 tablet 1 tablet Every 8 Hours Scheduled 3/15/2018     Sig - Route: Take 1 tablet by mouth Every 8 (Eight) Hours. - Oral    cetirizine (zyrTEC) tablet 10 mg 10 mg Daily 3/15/2018     Sig - Route: Take 1 tablet by mouth Daily. - Oral    divalproex (DEPAKOTE) DR tablet 250 mg 250 mg 3 Times Daily 3/16/2018     Sig - Route: Take 1 tablet by mouth 3 (Three) Times a Day. - Oral    enoxaparin (LOVENOX) syringe 40 mg 40 mg Every 24 Hours 3/16/2018     Sig - Route: Inject 0.4 mL under the skin Daily. - Subcutaneous    Magnesium Sulfate 2 gram Bolus, followed by 8 gram infusion (total Mg dose 10 grams)- Mg less than or equal to 1mg/dL 2 g As Needed 3/15/2018     Sig - Route: Infuse 50 mL into a venous catheter As Needed (Mg less than or equal to 1mg/dL). - Intravenous    Linked Group 1:  \"Or\" Linked Group Details        magnesium sulfate 4 gram infusion- Mg 1.6-1.9 mg/dL 4 g As Needed 3/15/2018     Sig - Route: Infuse 100 mL into a venous catheter As Needed (Mg 1.6-1.9 mg/dL). - Intravenous    Linked Group 1:  \"Or\" Linked Group Details        Magnesium Sulfate 6 gram Infusion (2 gm x 3) -Mg 1.1 -1.5 mg/dL 2 g As Needed 3/15/2018     Sig - Route: Infuse 50 mL into a venous catheter As Needed (Mg 1.1 -1.5 mg/dL). - Intravenous    Linked Group 1:  \"Or\" Linked Group Details        potassium & sodium phosphates (PHOS-NAK) 280-160-250 MG packet - for Phosphorus 1.25 - 2.1 mg/dL 2 packet Once As Needed 3/15/2018     Sig - Route: Take 2 packets by mouth 1 (One) Time As Needed (Phosphorus 1.25 - 2.1 mg/dL). - Oral    Linked Group 2:  \"Or\" Linked Group Details        potassium & sodium phosphates (PHOS-NAK) 280-160-250 MG packet - for Phosphorus less than 1.25 mg/dL 2 packet Every 6 Hours PRN 3/15/2018     Sig - Route: Take 2 packets by " "mouth Every 6 (Six) Hours As Needed (Phosphorus less than 1.25 mg/dL). - Oral    Linked Group 2:  \"Or\" Linked Group Details        potassium chloride (KLOR-CON) packet 40 mEq 40 mEq As Needed 3/15/2018     Sig - Route: Take 40 mEq by mouth As Needed (potassium replacement, see admin instructions). - Oral    potassium chloride (MICRO-K) CR capsule 40 mEq 40 mEq As Needed 3/15/2018     Sig - Route: Take 4 capsules by mouth As Needed (potassium replacement.  see admin instructions). - Oral    promethazine (PHENERGAN) 25 mg, diphenhydrAMINE (BENADRYL) 25 mg in sodium chloride 0.9 % 100 mL IVPB  Once 3/15/2018 3/15/2018    Sig - Route: Infuse  into a venous catheter 1 (One) Time. - Intravenous    promethazine (PHENERGAN) injection 12.5 mg 12.5 mg Every 6 Hours PRN 3/15/2018     Sig - Route: Infuse 0.5 mL into a venous catheter Every 6 (Six) Hours As Needed for Nausea or Vomiting. - Intravenous    promethazine (PHENERGAN) injection 12.5 mg 12.5 mg Every 6 Hours PRN 3/15/2018     Sig - Route: Infuse 0.5 mL into a venous catheter Every 6 (Six) Hours As Needed for Nausea or Vomiting. - Intravenous    Linked Group 3:  \"Or\" Linked Group Details        promethazine (PHENERGAN) tablet 12.5 mg 12.5 mg Every 6 Hours PRN 3/15/2018     Sig - Route: Take 1 tablet by mouth Every 6 (Six) Hours As Needed for Nausea or Vomiting. - Oral    Linked Group 3:  \"Or\" Linked Group Details        sertraline (ZOLOFT) tablet 100 mg 100 mg 2 Times Daily 3/15/2018     Sig - Route: Take 1 tablet by mouth 2 (Two) Times a Day. - Oral    sodium chloride 0.9 % bolus 1,000 mL 1,000 mL Once 3/15/2018 3/15/2018    Sig - Route: Infuse 1,000 mL into a venous catheter 1 (One) Time. - Intravenous    sodium chloride 0.9 % flush 1-10 mL 1-10 mL As Needed 3/15/2018     Sig - Route: Infuse 1-10 mL into a venous catheter As Needed for Line Care. - Intravenous    sodium chloride 0.9 % infusion 100 mL/hr Continuous 3/15/2018     Sig - Route: Infuse 100 mL/hr into a " "venous catheter Continuous. - Intravenous    valproate (DEPACON) 1,000 mg in sodium chloride 0.9 % 100 mL  Once 3/15/2018 3/15/2018    Sig - Route: Infuse  into a venous catheter 1 (One) Time. - Intravenous    butalbital-acetaminophen-caffeine (FIORICET, ESGIC) -40 MG per tablet 2 tablet (Discontinued) 2 tablet Every 4 Hours PRN 3/15/2018 3/15/2018    Sig - Route: Take 2 tablets by mouth Every 4 (Four) Hours As Needed for Headache. - Oral    diphenhydrAMINE (BENADRYL) injection 25 mg (Discontinued) 25 mg Once 3/15/2018 3/15/2018    Sig - Route: Infuse 0.5 mL into a venous catheter 1 (One) Time. - Intravenous    Reason for Discontinue: Alternate therapy    promethazine (PHENERGAN) injection 25 mg (Discontinued) 25 mg Once 3/15/2018 3/15/2018    Sig - Route: Infuse 1 mL into a venous catheter 1 (One) Time. - Intravenous    Reason for Discontinue: Alternate therapy          Operative/Procedure Notes (all)     No notes of this type exist for this encounter.           Physician Progress Notes (all)      Geoffrey Trejo MD at 3/16/2018 10:59 AM              [facility] Medicine Services  PROGRESS NOTE    Patient Name: Susanna Camilo  : 1975  MRN: [id number]    Date of Admission: 3/15/2018  Length of Stay: 1  Primary Care Physician: MIGDALIA Gooden    Subjective   Subjective     CC:  F/u HA    HPI:  Still complains of severe HA and neck pain \"worse than 10 out of 10\".  Nausea is improved and is tolerating some food.  No other complaints or issues overnight.    Review of Systems   Constitutional: Negative for fever.   Respiratory: Negative for shortness of breath.    Cardiovascular: Negative for chest pain.   Gastrointestinal: Negative for abdominal pain.   Musculoskeletal: Positive for neck pain.   Neurological: Positive for headaches.   All other systems reviewed and are negative.    Otherwise ROS is negative except as mentioned in the HPI.    Objective   Objective     Vital " Signs:   Temp:  [97.6 °F (36.4 °C)-98.1 °F (36.7 °C)] 98.1 °F (36.7 °C)  Heart Rate:  [52-69] 69  Resp:  [18-20] 18  BP: (109-139)/(56-88) 122/56        Physical Exam:  Constitutional: No acute distress, awake, alert  HENT: NCAT, mucous membranes moist  Respiratory: Clear to auscultation bilaterally, respiratory effort normal   Cardiovascular: RRR, no murmurs, rubs, or gallops, palpable pedal pulses bilaterally  Gastrointestinal: Positive bowel sounds, soft, nontender, nondistended  Musculoskeletal: No bilateral ankle edema  Psychiatric: Appropriate affect, cooperative  Neurologic: Oriented x 3, strength symmetric in all extremities, Cranial Nerves grossly intact to confrontation, speech clear. +HA with movement she says.  Some photophobia  Skin: No rashes      Results Reviewed:  I have personally reviewed current lab, radiology, and data and agree.      Results from last 7 days  Lab Units 03/15/18  1713 03/14/18  2043   WBC 10*3/mm3 5.57 8.17   HEMOGLOBIN g/dL 11.5 12.0   HEMATOCRIT % 35.2 36.2*   PLATELETS 10*3/mm3 116* 144       Results from last 7 days  Lab Units 03/16/18  0556 03/15/18  1713 03/14/18  2043   SODIUM mmol/L  --  140 141   POTASSIUM mmol/L 4.2 3.1* 3.6   CHLORIDE mmol/L  --  108 110   CO2 mmol/L  --  24.0 25.4   BUN mg/dL  --  6* 8   CREATININE mg/dL  --  0.60 0.48   GLUCOSE mg/dL  --  112* 95   CALCIUM mg/dL  --  8.2* 9.2   ALT (SGPT) U/L  --  53* 54*   AST (SGOT) U/L  --  39* 37*     Estimated Creatinine Clearance: 141 mL/min (by C-G formula based on SCr of 0.6 mg/dL).  No results found for: BNP  No results found for: PHART    Microbiology Results Abnormal     None          Imaging Results (last 24 hours)     ** No results found for the last 24 hours. **             I have reviewed the medications.    Assessment/Plan   Assessment / Plan     Hospital Problem List     * (Principal)Intractable headache    Nausea and vomiting    History of migraine headaches    Benign brain tumor    Fibromyalgia         Brief Hospital Course to date:  Susanna Camilo is a 43 y.o. female history of chronic migraines and fibromyalgia presents to [facility] emergency room with complaints of intractable headache.  CT scan showed stable meningioma.  Transferred to [place] for possible DHE protocol.      Assessment & Plan:  - Dr. Key to see today and anticipate DHE protocol.  Got IV depakote last night.  - LUIS ALBERTO reviewed.  Multiple lortab/percocets scripts for chronic pain and stadol as well.  - K+ replaced  - home when able    DVT Prophylaxis:  lovenox    CODE STATUS: Full Code    Disposition: I expect the patient to be discharged TBD    Electronically signed by Geoffrey Trejo MD, 03/16/18, 10:59 AM.        Electronically signed by Geoffrey Trejo MD at 3/16/2018 11:09 AM

## 2018-03-16 NOTE — PROGRESS NOTES
Discharge Planning Assessment  [facility]     Patient Name: Susanna Camilo  MRN: [id number]  Today's Date: 3/16/2018    Admit Date: 3/15/2018          Discharge Needs Assessment     Row Name 03/16/18 1553       Living Environment    Lives With spouse    Current Living Arrangements home/apartment/condo    Primary Care Provided by self    Provides Primary Care For no one    Family Caregiver if Needed spouse    Quality of Family Relationships unable to assess    Able to Return to Prior Arrangements yes       Resource/Environmental Concerns    Resource/Environmental Concerns none    Transportation Concerns car, none       Transition Planning    Patient/Family Anticipates Transition to home    Patient/Family Anticipated Services at Transition none    Transportation Anticipated family or friend will provide       Discharge Needs Assessment    Readmission Within the Last 30 Days no previous admission in last 30 days    Concerns to be Addressed no discharge needs identified    Equipment Currently Used at Home none    Anticipated Changes Related to Illness none            Discharge Plan     Row Name 03/16/18 1554       Plan    Plan Home    Patient/Family in Agreement with Plan yes    Plan Comments Anticipates discharge to home with no discharge planning needs identified.     Final Discharge Disposition Code 01 - home or self-care        Destination     No service coordination in this encounter.      Durable Medical Equipment     No service coordination in this encounter.      Dialysis/Infusion     No service coordination in this encounter.      Home Medical Care     No service coordination in this encounter.      Social Care     No service coordination in this encounter.                Demographic Summary     Row Name 03/16/18 1552       General Information    Admission Type inpatient    Referral Source admission list    Preferred Language English     Used During This Interaction no       Contact Information     Permission Granted to Share Info With     Contact Information Obtained for             Functional Status     Row Name 03/16/18 1552       Functional Status    Usual Activity Tolerance good    Current Activity Tolerance good       Functional Status, IADL    Medications independent    Meal Preparation independent    Housekeeping independent    Laundry independent    Shopping independent            Psychosocial    No documentation.           Abuse/Neglect    No documentation.           Legal    No documentation.           Substance Abuse    No documentation.           Patient Forms    No documentation.         Maida Angeles RN

## 2018-03-16 NOTE — PLAN OF CARE
Problem: Patient Care Overview  Goal: Plan of Care Review  Outcome: Ongoing (interventions implemented as appropriate)   03/15/18 2000 03/16/18 0340 03/16/18 0349   Plan of Care Review   Progress --  improving --    OTHER   Outcome Summary --  --  Issues with HA pain somewhat resolved with IV stadol. K+ supplements given. To have neuro consult today. IVFs ongoing   Coping/Psychosocial   Plan of Care Reviewed With patient;spouse --  --

## 2018-03-16 NOTE — SIGNIFICANT NOTE
RN states she is passing on that spouse is very concerned pt had one episode of diarrhea tonight. NNO. Please f/u PRN.

## 2018-03-16 NOTE — PLAN OF CARE
Problem: Patient Care Overview  Goal: Plan of Care Review  Outcome: Ongoing (interventions implemented as appropriate)   03/15/18 2000 03/16/18 0340   Plan of Care Review   Progress --  improving   OTHER   Outcome Summary --  PCA for pain mgmt. Up in chair during the shift and ambulated in jones. ABD binder in place. Voiding. IVFs at 175/hr. Doing well.   Coping/Psychosocial   Plan of Care Reviewed With patient;spouse --      Goal: Discharge Needs Assessment  Outcome: Ongoing (interventions implemented as appropriate)      Problem: Pain, Acute (Adult)  Goal: Identify Related Risk Factors and Signs and Symptoms  Outcome: Ongoing (interventions implemented as appropriate)    Goal: Acceptable Pain Control/Comfort Level  Outcome: Ongoing (interventions implemented as appropriate)

## 2018-03-16 NOTE — PLAN OF CARE
Problem: Pain, Acute (Adult)  Goal: Acceptable Pain Control/Comfort Level  Outcome: Ongoing (interventions implemented as appropriate)   03/16/18 0340   Pain, Acute (Adult)   Acceptable Pain Control/Comfort Level making progress toward outcome   Seen by neurology.

## 2018-03-17 PROCEDURE — 25010000002 PROMETHAZINE PER 50 MG: Performed by: PSYCHIATRY & NEUROLOGY

## 2018-03-17 PROCEDURE — 25010000002 PROMETHAZINE PER 50 MG: Performed by: HOSPITALIST

## 2018-03-17 PROCEDURE — 25010000002 ENOXAPARIN PER 10 MG: Performed by: NURSE PRACTITIONER

## 2018-03-17 PROCEDURE — 25010000002 DIHYDROERGOTAMINE MESYLATE PER 1 MG: Performed by: PSYCHIATRY & NEUROLOGY

## 2018-03-17 PROCEDURE — 99232 SBSQ HOSP IP/OBS MODERATE 35: CPT | Performed by: PSYCHIATRY & NEUROLOGY

## 2018-03-17 PROCEDURE — 25010000002 BUTORPHANOL PER 1 MG: Performed by: NURSE PRACTITIONER

## 2018-03-17 PROCEDURE — 25010000002 HYDROMORPHONE PER 4 MG: Performed by: PSYCHIATRY & NEUROLOGY

## 2018-03-17 PROCEDURE — 99232 SBSQ HOSP IP/OBS MODERATE 35: CPT | Performed by: INTERNAL MEDICINE

## 2018-03-17 PROCEDURE — 25010000002 DEXAMETHASONE PER 1 MG: Performed by: PSYCHIATRY & NEUROLOGY

## 2018-03-17 RX ORDER — HYDROXYZINE HYDROCHLORIDE 25 MG/1
25 TABLET, FILM COATED ORAL EVERY 6 HOURS PRN
Status: DISCONTINUED | OUTPATIENT
Start: 2018-03-17 | End: 2018-03-18

## 2018-03-17 RX ORDER — HYDROXYZINE HYDROCHLORIDE 50 MG/ML
25 INJECTION, SOLUTION INTRAMUSCULAR EVERY 6 HOURS PRN
Status: DISCONTINUED | OUTPATIENT
Start: 2018-03-17 | End: 2018-03-18

## 2018-03-17 RX ORDER — HYDROXYZINE HYDROCHLORIDE 50 MG/ML
25 INJECTION, SOLUTION INTRAMUSCULAR EVERY 6 HOURS PRN
Status: DISCONTINUED | OUTPATIENT
Start: 2018-03-17 | End: 2018-03-17

## 2018-03-17 RX ORDER — HYDROXYZINE HYDROCHLORIDE 25 MG/1
25 TABLET, FILM COATED ORAL EVERY 6 HOURS PRN
Status: DISCONTINUED | OUTPATIENT
Start: 2018-03-17 | End: 2018-03-17

## 2018-03-17 RX ORDER — HYDROMORPHONE HYDROCHLORIDE 1 MG/ML
1 INJECTION, SOLUTION INTRAMUSCULAR; INTRAVENOUS; SUBCUTANEOUS ONCE
Status: COMPLETED | OUTPATIENT
Start: 2018-03-17 | End: 2018-03-17

## 2018-03-17 RX ADMIN — DIHYDROERGOTAMINE MESYLATE 1 MG: 1 INJECTION, SOLUTION INTRAMUSCULAR; INTRAVENOUS; SUBCUTANEOUS at 21:56

## 2018-03-17 RX ADMIN — DIVALPROEX SODIUM 250 MG: 250 TABLET, DELAYED RELEASE ORAL at 08:16

## 2018-03-17 RX ADMIN — SERTRALINE HYDROCHLORIDE 100 MG: 100 TABLET ORAL at 08:17

## 2018-03-17 RX ADMIN — DIHYDROERGOTAMINE MESYLATE 1 MG: 1 INJECTION, SOLUTION INTRAMUSCULAR; INTRAVENOUS; SUBCUTANEOUS at 13:59

## 2018-03-17 RX ADMIN — BUTALBITAL, ACETAMINOPHEN AND CAFFEINE 1 TABLET: 50; 325; 40 TABLET ORAL at 11:06

## 2018-03-17 RX ADMIN — SODIUM CHLORIDE 100 ML/HR: 9 INJECTION, SOLUTION INTRAVENOUS at 02:40

## 2018-03-17 RX ADMIN — SERTRALINE HYDROCHLORIDE 100 MG: 100 TABLET ORAL at 21:56

## 2018-03-17 RX ADMIN — BUTORPHANOL TARTRATE 2 MG: 2 INJECTION, SOLUTION INTRAMUSCULAR; INTRAVENOUS at 06:03

## 2018-03-17 RX ADMIN — DIVALPROEX SODIUM 250 MG: 250 TABLET, DELAYED RELEASE ORAL at 15:27

## 2018-03-17 RX ADMIN — BUSPIRONE HYDROCHLORIDE 15 MG: 15 TABLET ORAL at 21:56

## 2018-03-17 RX ADMIN — DEXAMETHASONE SODIUM PHOSPHATE 8 MG: 4 INJECTION, SOLUTION INTRAMUSCULAR; INTRAVENOUS at 13:59

## 2018-03-17 RX ADMIN — PROMETHAZINE HYDROCHLORIDE 12.5 MG: 25 INJECTION INTRAMUSCULAR; INTRAVENOUS at 17:20

## 2018-03-17 RX ADMIN — HYDROMORPHONE HYDROCHLORIDE 1 MG: 10 INJECTION INTRAMUSCULAR; INTRAVENOUS; SUBCUTANEOUS at 20:06

## 2018-03-17 RX ADMIN — PROMETHAZINE HYDROCHLORIDE 12.5 MG: 25 INJECTION INTRAMUSCULAR; INTRAVENOUS at 20:07

## 2018-03-17 RX ADMIN — PROMETHAZINE HYDROCHLORIDE 12.5 MG: 25 INJECTION INTRAMUSCULAR; INTRAVENOUS at 17:23

## 2018-03-17 RX ADMIN — HYDROXYZINE HYDROCHLORIDE 25 MG: 25 TABLET, FILM COATED ORAL at 18:26

## 2018-03-17 RX ADMIN — DIVALPROEX SODIUM 250 MG: 250 TABLET, DELAYED RELEASE ORAL at 21:56

## 2018-03-17 RX ADMIN — BUTALBITAL, ACETAMINOPHEN AND CAFFEINE 1 TABLET: 50; 325; 40 TABLET ORAL at 03:54

## 2018-03-17 RX ADMIN — CETIRIZINE HYDROCHLORIDE 10 MG: 10 TABLET, FILM COATED ORAL at 08:17

## 2018-03-17 RX ADMIN — PROMETHAZINE HYDROCHLORIDE 6.25 MG: 25 INJECTION INTRAMUSCULAR; INTRAVENOUS at 05:17

## 2018-03-17 RX ADMIN — DIHYDROERGOTAMINE MESYLATE 1 MG: 1 INJECTION, SOLUTION INTRAMUSCULAR; INTRAVENOUS; SUBCUTANEOUS at 05:17

## 2018-03-17 RX ADMIN — PROMETHAZINE HYDROCHLORIDE 12.5 MG: 25 INJECTION INTRAMUSCULAR; INTRAVENOUS at 13:02

## 2018-03-17 RX ADMIN — BUSPIRONE HYDROCHLORIDE 15 MG: 15 TABLET ORAL at 05:18

## 2018-03-17 RX ADMIN — ENOXAPARIN SODIUM 40 MG: 40 INJECTION SUBCUTANEOUS at 05:16

## 2018-03-17 RX ADMIN — BUSPIRONE HYDROCHLORIDE 15 MG: 15 TABLET ORAL at 13:59

## 2018-03-17 NOTE — PLAN OF CARE
Problem: Patient Care Overview  Goal: Plan of Care Review  Outcome: Ongoing (interventions implemented as appropriate)   03/17/18 0334   Plan of Care Review   Progress improving   OTHER   Outcome Summary IVFs. Seen by Dr Key. DHE ciourse initiated. K= levels improved. No new issues   Coping/Psychosocial   Plan of Care Reviewed With patient     Goal: Discharge Needs Assessment  Outcome: Ongoing (interventions implemented as appropriate)      Problem: Pain, Acute (Adult)  Goal: Identify Related Risk Factors and Signs and Symptoms  Outcome: Ongoing (interventions implemented as appropriate)

## 2018-03-17 NOTE — SIGNIFICANT NOTE
Pt tells RN that takes a day of DHE before can go without stadol, was not ordered more doses today (one times doses overnight). Will give another dose, RN can call back for additional one time dose if needs before AM. Please f/u.

## 2018-03-17 NOTE — PROGRESS NOTES
[facility] Medicine Services  PROGRESS NOTE    Patient Name: Susanna Camilo  : 1975  MRN: [id number]    Date of Admission: 3/15/2018  Length of Stay: 2  Primary Care Physician: MIGDALIA Gooden    Subjective   Subjective     CC:  F/u HA    HPI:  She is doing better.  Says her HA has gone from a 10 to a 5 or 6.  Got stadol overnight as well.  Tolerating a diet.  No other issues.    Review of Systems   Constitutional: Negative for fever.   Respiratory: Negative for shortness of breath.    Cardiovascular: Negative for chest pain.   Gastrointestinal: Negative for abdominal pain.   Musculoskeletal: Positive for neck pain.   Neurological: Positive for headaches.   All other systems reviewed and are negative.    Otherwise ROS is negative except as mentioned in the HPI.    Objective   Objective     Vital Signs:   Temp:  [97.6 °F (36.4 °C)-98.3 °F (36.8 °C)] 98.1 °F (36.7 °C)  Heart Rate:  [67-82] 79  Resp:  [16-18] 16  BP: (103-120)/(57-71) 116/65        Physical Exam:  Constitutional: No acute distress, awake, alert, appears comfortable  HENT: NCAT, mucous membranes moist  Respiratory: Clear to auscultation bilaterally, respiratory effort normal   Cardiovascular: RRR, no murmurs, rubs, or gallops, palpable pedal pulses bilaterally  Gastrointestinal: Positive bowel sounds, soft, nontender, nondistended  Musculoskeletal: No bilateral ankle edema  Psychiatric: Appropriate affect, cooperative  Neurologic: Oriented x 3, no focal deficits  Skin: No rashes      Results Reviewed:  I have personally reviewed current lab, radiology, and data and agree.      Results from last 7 days  Lab Units 03/15/18  1713 18  2043   WBC 10*3/mm3 5.57 8.17   HEMOGLOBIN g/dL 11.5 12.0   HEMATOCRIT % 35.2 36.2*   PLATELETS 10*3/mm3 116* 144       Results from last 7 days  Lab Units 18  0556 03/15/18  1713 18  2043   SODIUM mmol/L  --  140 141   POTASSIUM mmol/L 4.2 3.1* 3.6   CHLORIDE  mmol/L  --  108 110   CO2 mmol/L  --  24.0 25.4   BUN mg/dL  --  6* 8   CREATININE mg/dL  --  0.60 0.48   GLUCOSE mg/dL  --  112* 95   CALCIUM mg/dL  --  8.2* 9.2   ALT (SGPT) U/L  --  53* 54*   AST (SGOT) U/L  --  39* 37*     Estimated Creatinine Clearance: 141 mL/min (by C-G formula based on SCr of 0.6 mg/dL).  No results found for: BNP  No results found for: PHART    Microbiology Results Abnormal     None          Imaging Results (last 24 hours)     ** No results found for the last 24 hours. **             I have reviewed the medications.    Assessment/Plan   Assessment / Plan     Hospital Problem List     * (Principal)Intractable headache    Nausea and vomiting    History of migraine headaches    Benign brain tumor    Fibromyalgia        Brief Hospital Course to date:  Susanna Camilo is a 43 y.o. female history of chronic migraines and fibromyalgia presents to [place] emergency room with complaints of intractable headache.  CT scan showed stable meningioma.  Transferred to [place] for possible DHE protocol.      Assessment & Plan:  - continue on DHE protocol. Is clinically improved.  - LUIS ALBERTO reviewed.  Multiple lortab/percocets scripts for chronic pain and stadol as well.  - K+ replaced  - home when after completion of protocol, likely Monday    DVT Prophylaxis:  lovenox    CODE STATUS: Full Code    Disposition: I expect the patient to be discharged Monday.    Electronically signed by Geoffrey Trejo MD, 03/17/18, 8:51 AM.

## 2018-03-17 NOTE — PROGRESS NOTES
"Neurology       Patient Care Team:  MIGDALIA Gooden as PCP - General (Family Medicine)    Chief complaint headache      Subjective .     History:  Head pain continues (rates at 6/10), can't rest, nausea continues, although phenergan usually helps. Reports DHE usually (has done protocol about 6 times before) starts to help after about 2 days, but requesting single dose of pain medication so she can rest tonight.   Also received dexamethasone, and on VPA.     ROS: +nausea, no chest pain, palpitations    Objective     Vital Signs   Blood pressure 130/81, pulse 52, temperature 98.4 °F (36.9 °C), temperature source Oral, resp. rate 14, height 175.3 cm (69.02\"), weight 73.9 kg (163 lb), SpO2 98 %, not currently breastfeeding.    Physical Exam:              Neuro: wdww in mod distress, alert, fluent, appropriate  EOMI face symm TML  Moves all ext well  Coordination intact    Results Review:              Head CT pers reviewed, small right frontal meningioma    Assessment/Plan     Intractable migraine, s/p DHEx4 so far. Long d/w pt and  re: DHE, pain medications, concern for prolonging headache with pain meds. Have agreed to single dose of dilaudid tonight. Would advise tapering off stadol in future, but likely to exacerbate headache to do that now.     I discussed the patients findings and my recommendations with patient, family and nursing staff    Cele Crystal MD  03/17/18  7:39 PM    "

## 2018-03-18 PROCEDURE — 25010000002 ENOXAPARIN PER 10 MG: Performed by: NURSE PRACTITIONER

## 2018-03-18 PROCEDURE — 63710000001 PROMETHAZINE PER 12.5 MG: Performed by: HOSPITALIST

## 2018-03-18 PROCEDURE — 25010000002 DEXAMETHASONE PER 1 MG: Performed by: PSYCHIATRY & NEUROLOGY

## 2018-03-18 PROCEDURE — 25010000002 DIHYDROERGOTAMINE MESYLATE PER 1 MG: Performed by: PSYCHIATRY & NEUROLOGY

## 2018-03-18 PROCEDURE — 25010000002 PROMETHAZINE PER 50 MG: Performed by: HOSPITALIST

## 2018-03-18 PROCEDURE — 99231 SBSQ HOSP IP/OBS SF/LOW 25: CPT | Performed by: PSYCHIATRY & NEUROLOGY

## 2018-03-18 PROCEDURE — 99231 SBSQ HOSP IP/OBS SF/LOW 25: CPT | Performed by: NURSE PRACTITIONER

## 2018-03-18 RX ORDER — HYDROXYZINE HYDROCHLORIDE 25 MG/1
25 TABLET, FILM COATED ORAL EVERY 6 HOURS PRN
Status: DISCONTINUED | OUTPATIENT
Start: 2018-03-18 | End: 2018-03-22 | Stop reason: HOSPADM

## 2018-03-18 RX ORDER — HYDROXYZINE HYDROCHLORIDE 50 MG/ML
25 INJECTION, SOLUTION INTRAMUSCULAR EVERY 6 HOURS PRN
Status: DISCONTINUED | OUTPATIENT
Start: 2018-03-18 | End: 2018-03-22 | Stop reason: HOSPADM

## 2018-03-18 RX ADMIN — DIVALPROEX SODIUM 250 MG: 250 TABLET, DELAYED RELEASE ORAL at 09:06

## 2018-03-18 RX ADMIN — HYDROXYZINE HYDROCHLORIDE 25 MG: 25 TABLET, FILM COATED ORAL at 09:06

## 2018-03-18 RX ADMIN — PROMETHAZINE HYDROCHLORIDE 12.5 MG: 25 INJECTION INTRAMUSCULAR; INTRAVENOUS at 09:06

## 2018-03-18 RX ADMIN — BUSPIRONE HYDROCHLORIDE 15 MG: 15 TABLET ORAL at 21:46

## 2018-03-18 RX ADMIN — DIHYDROERGOTAMINE MESYLATE 1 MG: 1 INJECTION, SOLUTION INTRAMUSCULAR; INTRAVENOUS; SUBCUTANEOUS at 05:45

## 2018-03-18 RX ADMIN — BUSPIRONE HYDROCHLORIDE 15 MG: 15 TABLET ORAL at 14:04

## 2018-03-18 RX ADMIN — BUTALBITAL, ACETAMINOPHEN AND CAFFEINE 1 TABLET: 50; 325; 40 TABLET ORAL at 16:10

## 2018-03-18 RX ADMIN — HYDROXYZINE HYDROCHLORIDE 25 MG: 25 TABLET, FILM COATED ORAL at 21:46

## 2018-03-18 RX ADMIN — DIHYDROERGOTAMINE MESYLATE 1 MG: 1 INJECTION, SOLUTION INTRAMUSCULAR; INTRAVENOUS; SUBCUTANEOUS at 14:05

## 2018-03-18 RX ADMIN — DIVALPROEX SODIUM 250 MG: 250 TABLET, DELAYED RELEASE ORAL at 21:46

## 2018-03-18 RX ADMIN — BUSPIRONE HYDROCHLORIDE 15 MG: 15 TABLET ORAL at 05:45

## 2018-03-18 RX ADMIN — DEXAMETHASONE SODIUM PHOSPHATE 8 MG: 4 INJECTION, SOLUTION INTRAMUSCULAR; INTRAVENOUS at 14:09

## 2018-03-18 RX ADMIN — PROMETHAZINE HYDROCHLORIDE 12.5 MG: 25 INJECTION INTRAMUSCULAR; INTRAVENOUS at 02:42

## 2018-03-18 RX ADMIN — SERTRALINE HYDROCHLORIDE 100 MG: 100 TABLET ORAL at 21:46

## 2018-03-18 RX ADMIN — BUTALBITAL, ACETAMINOPHEN AND CAFFEINE 1 TABLET: 50; 325; 40 TABLET ORAL at 02:40

## 2018-03-18 RX ADMIN — BUTALBITAL, ACETAMINOPHEN AND CAFFEINE 1 TABLET: 50; 325; 40 TABLET ORAL at 06:47

## 2018-03-18 RX ADMIN — HYDROXYZINE HYDROCHLORIDE 25 MG: 25 TABLET, FILM COATED ORAL at 02:40

## 2018-03-18 RX ADMIN — DIVALPROEX SODIUM 250 MG: 250 TABLET, DELAYED RELEASE ORAL at 15:35

## 2018-03-18 RX ADMIN — DIHYDROERGOTAMINE MESYLATE 1 MG: 1 INJECTION, SOLUTION INTRAMUSCULAR; INTRAVENOUS; SUBCUTANEOUS at 21:46

## 2018-03-18 RX ADMIN — PROMETHAZINE HYDROCHLORIDE 12.5 MG: 25 INJECTION INTRAMUSCULAR; INTRAVENOUS at 21:46

## 2018-03-18 RX ADMIN — SERTRALINE HYDROCHLORIDE 100 MG: 100 TABLET ORAL at 09:05

## 2018-03-18 RX ADMIN — CETIRIZINE HYDROCHLORIDE 10 MG: 10 TABLET, FILM COATED ORAL at 09:06

## 2018-03-18 RX ADMIN — BUTALBITAL, ACETAMINOPHEN AND CAFFEINE 1 TABLET: 50; 325; 40 TABLET ORAL at 21:46

## 2018-03-18 RX ADMIN — PROMETHAZINE HYDROCHLORIDE 12.5 MG: 12.5 TABLET ORAL at 15:31

## 2018-03-18 RX ADMIN — ENOXAPARIN SODIUM 40 MG: 40 INJECTION SUBCUTANEOUS at 05:45

## 2018-03-18 NOTE — PROGRESS NOTES
"    [facility] Medicine Services  PROGRESS NOTE    Patient Name: Susanna Camilo  : 1975  MRN: [id number]    Date of Admission: 3/15/2018  Length of Stay: 3  Primary Care Physician: MIGDALIA Gooden    Subjective   Subjective   CC:  F/u HA    HPI:  Patient sitting up in bed in NAD.  Patient looks comfortable, but states her pain is a 5/10.  Patient states she slept very well overnight with \"the pain medicine and Phenergan\".  Patient states when she arrived her pain was 10/10.  No adverse events overnight.  No family in the room.  +BM    Review of Systems   Constitutional: Negative for fever.   Respiratory: Negative for shortness of breath.    Cardiovascular: Negative for chest pain.   Gastrointestinal: Positive for nausea. Negative for abdominal pain.   Musculoskeletal: Positive for neck pain.   Neurological: Positive for headaches.   All other systems reviewed and are negative.  Otherwise ROS is negative except as mentioned in the HPI.    Objective   Objective   Vital Signs:   Temp:  [97.9 °F (36.6 °C)-98.4 °F (36.9 °C)] 97.9 °F (36.6 °C)  Heart Rate:  [52-69] 69  Resp:  [14-20] 20  BP: (120-130)/(62-81) 120/62  Physical Exam:  General: Alert, well-developed well-nourished female in no acute distress    Head: Normocephalic atraumatic    Eyes: PERRLA, EOMI, nonicteric, conjunctiva normal    ENT: Pink, moist mucous membranes    Neck: Supple, midline neck ttp, trachea midline without lymphadenopathy, JVD, negative nuchal rigidity.      Cardiovascular: RRR  no M/R/G  +2 DP pulses    Respiratory: Nonlabored, symmetrical chest expansion, clear to auscultation bilaterally    Abdomen: Soft, nontender, nondistended,  positive bowel sounds in all 4 quadrants     Extremities: FROM in upper and lower extremities bilaterally. negative for edema/cyanosis.  Negative calf pain    Skin: Pink/warm/dry.  No rash or lesions noted    Neuro: Alert and oriented to person place time and situation, " speech is clear, follows all commands, recent and remote memory intact    Psych: Patient is pleasant and cooperative.  Normal affect.  Negative suicidal ideation or homicidal ideation.    Results Reviewed:  I have personally reviewed current lab, radiology, and data and agree.    Results from last 7 days  Lab Units 03/15/18  1713 03/14/18  2043   WBC 10*3/mm3 5.57 8.17   HEMOGLOBIN g/dL 11.5 12.0   HEMATOCRIT % 35.2 36.2*   PLATELETS 10*3/mm3 116* 144       Results from last 7 days  Lab Units 03/16/18  0556 03/15/18  1713 03/14/18  2043   SODIUM mmol/L  --  140 141   POTASSIUM mmol/L 4.2 3.1* 3.6   CHLORIDE mmol/L  --  108 110   CO2 mmol/L  --  24.0 25.4   BUN mg/dL  --  6* 8   CREATININE mg/dL  --  0.60 0.48   GLUCOSE mg/dL  --  112* 95   CALCIUM mg/dL  --  8.2* 9.2   ALT (SGPT) U/L  --  53* 54*   AST (SGOT) U/L  --  39* 37*     Estimated Creatinine Clearance: 141 mL/min (by C-G formula based on SCr of 0.6 mg/dL).  No results found for: BNP  No results found for: PHART    Microbiology Results Abnormal     None        Imaging Results (last 24 hours)     ** No results found for the last 24 hours. **        I have reviewed the medications.    Assessment/Plan   Assessment / Plan     Hospital Problem List     * (Principal)Intractable headache    Nausea and vomiting    History of migraine headaches    Benign brain tumor    Fibromyalgia        Brief Hospital Course to date:  Susanna Camilo is a 43 y.o. female history of chronic migraines and fibromyalgia presents to [place] emergency room with complaints of intractable headache.  CT scan showed stable meningioma.  Transferred to [place] for possible DHE protocol.    Assessment & Plan:  - continue on DHE protocol (Day 2/3). Is clinically improved.  - LUIS ALBERTO reviewed.  Multiple lortab/percocets scripts for chronic pain and stadol as well; knows she needs to stop Stadol d/t rebound HA's per neurology.  - K+ replaced  - home when after completion of protocol, likely Monday  -  Patient requesting additional pain medicine, but explained that we will continue with the protocol.    DVT Prophylaxis:  lovenox    CODE STATUS: Full Code    Disposition: I expect the patient to be discharged Monday.    Electronically signed by MIGDALIA Miller, 03/18/18, 8:28 AM.

## 2018-03-18 NOTE — PROGRESS NOTES
"Neurology       Patient Care Team:  MIGDALIA Gooden as PCP - General (Family Medicine)    Chief complaint headache       Subjective .     History:  Reports headache still severe, and \"can't keep anything down.\"  Felt single dose of dilaudid yesterday very helpful, would like to repeat. Reports 4 doses of DHE (\"should start working soon\") -- but per chart, has had 6. No vision changes.  Reports allergic to zofran, reglan, compazine    ROS: no fever, cp, sob    Objective     Vital Signs   Blood pressure 126/69, pulse 65, temperature 98.1 °F (36.7 °C), temperature source Oral, resp. rate 16, height 175.3 cm (69.02\"), weight 73.9 kg (163 lb), SpO2 95 %, not currently breastfeeding.    Physical Exam:              Neuro: wdma ww appears much more comfortable today. Alert, fluent, no dysarthria  EOMI face symm TML  Motor 5/5  Coordination intact      Assessment/Plan     Intractable migraine with nausea -- reports headache no better, but appears much more comfortable. Pt c/o nausea, but given extensive allergies, will just continue phenergan. DHE appears to be helping.    I discussed the patients findings and my recommendations with patient and primary care team    Cele Crystal MD  03/18/18  1:30 PM    "

## 2018-03-18 NOTE — PLAN OF CARE
Problem: Patient Care Overview  Goal: Plan of Care Review  Outcome: Ongoing (interventions implemented as appropriate)   03/18/18 1618   OTHER   Outcome Summary Pt. complained of persistent pain and nausea throughout shift. Pt. did not have any emesis. Possible discharge to home tomorrow.    Coping/Psychosocial   Plan of Care Reviewed With patient       Problem: Pain, Acute (Adult)  Goal: Identify Related Risk Factors and Signs and Symptoms  Outcome: Ongoing (interventions implemented as appropriate)   03/17/18 1634 03/18/18 1618   Pain, Acute (Adult)   Related Risk Factors (Acute Pain) --  fear;positioning;persistent pain   Signs and Symptoms (Acute Pain) fatigue/weakness --        Problem: Nausea/Vomiting (Adult)  Goal: Identify Related Risk Factors and Signs and Symptoms  Outcome: Ongoing (interventions implemented as appropriate)   03/18/18 0446   Nausea/Vomiting (Adult)   Signs and Symptoms (Nausea/Vomiting) abdominal discomfort/pain;report of emesis;retching/gagging     Goal: Symptom Relief  Outcome: Ongoing (interventions implemented as appropriate)    Goal: Adequate Hydration  Outcome: Ongoing (interventions implemented as appropriate)   03/18/18 1618   Nausea/Vomiting (Adult)   Adequate Hydration making progress toward outcome

## 2018-03-18 NOTE — PLAN OF CARE
Problem: Patient Care Overview  Goal: Plan of Care Review  Outcome: Ongoing (interventions implemented as appropriate)   03/17/18 0334 03/17/18 2000 03/18/18 0446   Plan of Care Review   Progress improving --  --    OTHER   Outcome Summary --  --  Pt rested well through shift. Neuro ordered 1x Dilaudid that worked well. Pt had midnight emesis that made her migraine hurt again. No new issues. Continue to monitor.   Coping/Psychosocial   Plan of Care Reviewed With --  patient;spouse --      Goal: Individualization and Mutuality  Outcome: Ongoing (interventions implemented as appropriate)    Goal: Discharge Needs Assessment  Outcome: Ongoing (interventions implemented as appropriate)    Goal: Interprofessional Rounds/Family Conf  Outcome: Ongoing (interventions implemented as appropriate)      Problem: Pain, Acute (Adult)  Goal: Identify Related Risk Factors and Signs and Symptoms  Outcome: Ongoing (interventions implemented as appropriate)      Problem: Nausea/Vomiting (Adult)  Goal: Identify Related Risk Factors and Signs and Symptoms  Outcome: Ongoing (interventions implemented as appropriate)    Goal: Symptom Relief  Outcome: Ongoing (interventions implemented as appropriate)    Goal: Adequate Hydration  Outcome: Ongoing (interventions implemented as appropriate)

## 2018-03-19 LAB
BACTERIA SPEC AEROBE CULT: NORMAL
BACTERIA SPEC AEROBE CULT: NORMAL

## 2018-03-19 PROCEDURE — 93005 ELECTROCARDIOGRAM TRACING: CPT | Performed by: NURSE PRACTITIONER

## 2018-03-19 PROCEDURE — 25010000002 DIHYDROERGOTAMINE MESYLATE PER 1 MG: Performed by: PSYCHIATRY & NEUROLOGY

## 2018-03-19 PROCEDURE — 99233 SBSQ HOSP IP/OBS HIGH 50: CPT | Performed by: NURSE PRACTITIONER

## 2018-03-19 PROCEDURE — 25010000002 BUTORPHANOL PER 1 MG: Performed by: PSYCHIATRY & NEUROLOGY

## 2018-03-19 PROCEDURE — 25010000002 PROMETHAZINE PER 50 MG: Performed by: HOSPITALIST

## 2018-03-19 PROCEDURE — 99231 SBSQ HOSP IP/OBS SF/LOW 25: CPT | Performed by: PSYCHIATRY & NEUROLOGY

## 2018-03-19 PROCEDURE — 93010 ELECTROCARDIOGRAM REPORT: CPT | Performed by: INTERNAL MEDICINE

## 2018-03-19 PROCEDURE — 25010000002 PROMETHAZINE PER 50 MG: Performed by: INTERNAL MEDICINE

## 2018-03-19 PROCEDURE — 25010000002 ENOXAPARIN PER 10 MG: Performed by: NURSE PRACTITIONER

## 2018-03-19 PROCEDURE — 25810000003 DEXTROSE-NACL PER 500 ML: Performed by: PSYCHIATRY & NEUROLOGY

## 2018-03-19 RX ORDER — DEXTROSE AND SODIUM CHLORIDE 5; .9 G/100ML; G/100ML
75 INJECTION, SOLUTION INTRAVENOUS CONTINUOUS
Status: DISCONTINUED | OUTPATIENT
Start: 2018-03-19 | End: 2018-03-21

## 2018-03-19 RX ORDER — HYDROCODONE BITARTRATE AND ACETAMINOPHEN 5; 325 MG/1; MG/1
1 TABLET ORAL EVERY 6 HOURS PRN
Status: DISCONTINUED | OUTPATIENT
Start: 2018-03-19 | End: 2018-03-19

## 2018-03-19 RX ORDER — PROMETHAZINE HYDROCHLORIDE 25 MG/ML
12.5 INJECTION, SOLUTION INTRAMUSCULAR; INTRAVENOUS ONCE
Status: COMPLETED | OUTPATIENT
Start: 2018-03-19 | End: 2018-03-19

## 2018-03-19 RX ORDER — TEMAZEPAM 15 MG/1
15 CAPSULE ORAL NIGHTLY PRN
Status: DISCONTINUED | OUTPATIENT
Start: 2018-03-19 | End: 2018-03-22 | Stop reason: HOSPADM

## 2018-03-19 RX ORDER — TIZANIDINE 4 MG/1
4 TABLET ORAL EVERY 8 HOURS PRN
Status: DISCONTINUED | OUTPATIENT
Start: 2018-03-19 | End: 2018-03-22 | Stop reason: HOSPADM

## 2018-03-19 RX ADMIN — BUSPIRONE HYDROCHLORIDE 15 MG: 15 TABLET ORAL at 04:55

## 2018-03-19 RX ADMIN — CETIRIZINE HYDROCHLORIDE 10 MG: 10 TABLET, FILM COATED ORAL at 11:24

## 2018-03-19 RX ADMIN — DIVALPROEX SODIUM 250 MG: 250 TABLET, DELAYED RELEASE ORAL at 11:24

## 2018-03-19 RX ADMIN — BUSPIRONE HYDROCHLORIDE 15 MG: 15 TABLET ORAL at 16:03

## 2018-03-19 RX ADMIN — ENOXAPARIN SODIUM 40 MG: 40 INJECTION SUBCUTANEOUS at 04:56

## 2018-03-19 RX ADMIN — PROMETHAZINE HYDROCHLORIDE 12.5 MG: 25 INJECTION INTRAMUSCULAR; INTRAVENOUS at 17:16

## 2018-03-19 RX ADMIN — PROMETHAZINE HYDROCHLORIDE 12.5 MG: 25 INJECTION INTRAMUSCULAR; INTRAVENOUS at 10:44

## 2018-03-19 RX ADMIN — BUTALBITAL, ACETAMINOPHEN AND CAFFEINE 1 TABLET: 50; 325; 40 TABLET ORAL at 08:40

## 2018-03-19 RX ADMIN — DIVALPROEX SODIUM 250 MG: 250 TABLET, DELAYED RELEASE ORAL at 21:24

## 2018-03-19 RX ADMIN — BUTORPHANOL TARTRATE 2 MG: 2 INJECTION, SOLUTION INTRAMUSCULAR; INTRAVENOUS at 22:02

## 2018-03-19 RX ADMIN — SERTRALINE HYDROCHLORIDE 100 MG: 100 TABLET ORAL at 21:24

## 2018-03-19 RX ADMIN — PROMETHAZINE HYDROCHLORIDE 12.5 MG: 25 INJECTION INTRAMUSCULAR; INTRAVENOUS at 04:56

## 2018-03-19 RX ADMIN — HYDROXYZINE HYDROCHLORIDE 25 MG: 25 TABLET, FILM COATED ORAL at 04:55

## 2018-03-19 RX ADMIN — SERTRALINE HYDROCHLORIDE 100 MG: 100 TABLET ORAL at 11:24

## 2018-03-19 RX ADMIN — TIZANIDINE 4 MG: 4 TABLET ORAL at 10:45

## 2018-03-19 RX ADMIN — DIHYDROERGOTAMINE MESYLATE 1 MG: 1 INJECTION, SOLUTION INTRAMUSCULAR; INTRAVENOUS; SUBCUTANEOUS at 04:56

## 2018-03-19 RX ADMIN — DEXTROSE AND SODIUM CHLORIDE 75 ML/HR: 5; 900 INJECTION, SOLUTION INTRAVENOUS at 16:03

## 2018-03-19 RX ADMIN — HYDROCODONE BITARTRATE AND ACETAMINOPHEN 1 TABLET: 5; 325 TABLET ORAL at 11:13

## 2018-03-19 RX ADMIN — BUTORPHANOL TARTRATE 2 MG: 2 INJECTION, SOLUTION INTRAMUSCULAR; INTRAVENOUS at 16:24

## 2018-03-19 RX ADMIN — Medication 5 MG: at 21:24

## 2018-03-19 RX ADMIN — PROMETHAZINE HYDROCHLORIDE 12.5 MG: 25 INJECTION INTRAMUSCULAR; INTRAVENOUS at 23:57

## 2018-03-19 RX ADMIN — BUSPIRONE HYDROCHLORIDE 15 MG: 15 TABLET ORAL at 21:24

## 2018-03-19 RX ADMIN — BUTALBITAL, ACETAMINOPHEN AND CAFFEINE 1 TABLET: 50; 325; 40 TABLET ORAL at 04:55

## 2018-03-19 NOTE — PROGRESS NOTES
Neurology       Patient Care Team:  MIGDALIA Gooden as PCP - General (Family Medicine)    Chief complaint: Migraine    History:  Headaches are severe.    She has finished the DHE without significant benefit.    Currently she is a 5 out of 5 after getting some Norco.    She's been using Stadol at home at 2 mg she gets two vials and a month, and has success with that.      Past Medical History:   Diagnosis Date   • Abdominal pain    • Abdominal swelling    • Hoyos's disease    • Bipolar 1 disorder    • Brain tumor     R Frontal Lobe per pt   • Brain tumor 2014   • Brain tumor (benign)    • Constipation    • DDD (degenerative disc disease), cervical 05/29/2017   • DDD (degenerative disc disease), cervical    • Diarrhea    • Fibromyalgia    • IBS (irritable bowel syndrome)    • Migraine    • Nausea & vomiting    • PONV (postoperative nausea and vomiting)    • PTSD (post-traumatic stress disorder)    • Rectal bleeding        Vital Signs   Vitals:    03/18/18 1500 03/19/18 0400 03/19/18 0700 03/19/18 1007   BP: 111/58 114/70 105/60 110/55   BP Location:  Left arm     Patient Position:  Lying     Pulse: 57 62 60 75   Resp: 16  18 20   Temp: 97.6 °F (36.4 °C) 98.5 °F (36.9 °C) 97.9 °F (36.6 °C) 97.8 °F (36.6 °C)   TempSrc: Oral Oral Oral Oral   SpO2: 96% 100% 98% 99%   Weight:       Height:           Physical Exam:   General: Acutely ill              Neuro: Oriented    Speech is normal.    Moves all extremities well.        Results Review:  No new results    Results from last 7 days  Lab Units 03/15/18  1713   WBC 10*3/mm3 5.57   HEMOGLOBIN g/dL 11.5   HEMATOCRIT % 35.2   PLATELETS 10*3/mm3 116*       Results from last 7 days  Lab Units 03/16/18  0556 03/15/18  1713 03/14/18  2043   SODIUM mmol/L  --  140 141   POTASSIUM mmol/L 4.2 3.1* 3.6   CHLORIDE mmol/L  --  108 110   CO2 mmol/L  --  24.0 25.4   BUN mg/dL  --  6* 8   CREATININE mg/dL  --  0.60 0.48   CALCIUM mg/dL  --  8.2* 9.2   BILIRUBIN mg/dL  --  0.4  0.6   ALK PHOS U/L  --  61 63   ALT (SGPT) U/L  --  53* 54*   AST (SGOT) U/L  --  39* 37*   GLUCOSE mg/dL  --  112* 95       Imaging Results (last 24 hours)     ** No results found for the last 24 hours. **          Assessment:  Status migraine    Plan:  DC Norco    Stadol 2 mg every 4 hours as needed for pain IV.    Melatonin 5 mg at bedtime    Restoril 15 mg bedtime as the for sleep.    Resume IV fluids D5 normal saline at 75 cc an hour    Comment:  Ongoing headache         I discussed the patients findings and my recommendations with patient and primary care team    Sathish Key MD  03/19/18  2:01 PM

## 2018-03-19 NOTE — PLAN OF CARE
Problem: Patient Care Overview  Goal: Plan of Care Review  Outcome: Ongoing (interventions implemented as appropriate)   03/17/18 0334 03/18/18 2000 03/19/18 0410   Plan of Care Review   Progress improving --  --    OTHER   Outcome Summary --  --  Pt c/o persistent pain/nausea at the beginning of shift. Pt and  asked to speak to house. House went in 2x to speak with pt about grievances about lack of pain medication for pt. Hospitalist and neuro called and neither wanted to add narcotics during shift. VSS. No emesis seen on shift. pt slept well after speaking with house.   Coping/Psychosocial   Plan of Care Reviewed With --  patient --      Goal: Individualization and Mutuality  Outcome: Ongoing (interventions implemented as appropriate)    Goal: Discharge Needs Assessment  Outcome: Ongoing (interventions implemented as appropriate)    Goal: Interprofessional Rounds/Family Conf  Outcome: Ongoing (interventions implemented as appropriate)      Problem: Pain, Acute (Adult)  Goal: Identify Related Risk Factors and Signs and Symptoms  Outcome: Ongoing (interventions implemented as appropriate)    Goal: Acceptable Pain Control/Comfort Level  Outcome: Ongoing (interventions implemented as appropriate)      Problem: Nausea/Vomiting (Adult)  Goal: Identify Related Risk Factors and Signs and Symptoms  Outcome: Ongoing (interventions implemented as appropriate)    Goal: Symptom Relief  Outcome: Ongoing (interventions implemented as appropriate)    Goal: Adequate Hydration  Outcome: Ongoing (interventions implemented as appropriate)

## 2018-03-19 NOTE — PROGRESS NOTES
Continued Stay Note  [facility]     Patient Name: Susanna Camilo  MRN: [id number]  Today's Date: 3/19/2018    Admit Date: 3/15/2018          Discharge Plan     Row Name 03/19/18 1559       Plan    Plan update    Patient/Family in Agreement with Plan yes    Plan Comments CM met w patient to see if she had questions re: resources or insurance needs. pt denied having questions or concerns at this time. Pt  was not in the room but on the phone. Pt asked  via phone if he had concerns he stated he did not. CM will continue to follow.               Discharge Codes    No documentation.           Latricia Adamson, RN

## 2018-03-19 NOTE — PLAN OF CARE
Problem: Patient Care Overview  Goal: Interprofessional Rounds/Family Conf  Olivia Brito, staff RN, Angi Martinez, RN Director, patient, patient hub, [facility], Maida Angeles, DENTON Case Manager, Dr. Resendiz, & Judi HIDALGO discussed pt's unrelieved h/a.  Dr. Key consulted.  Plan to give low-dose Lortab x3d & Phenergan IV x1.  Will monitor daily & prn.

## 2018-03-19 NOTE — PROGRESS NOTES
[facility] Medicine Services  PROGRESS NOTE    Patient Name: Susanna Camilo  : 1975  MRN: [id number]    Date of Admission: 3/15/2018  Length of Stay: 4  Primary Care Physician: MIGDALIA Gooden    Subjective   Subjective   CC:  F/u HA    HPI:  Patient is in the room crying with her head on her knees stating that her pain is horrible.   is in the room and very aggressive and angry that patient is not getting IV narcotics.   was yelling at floor staff and demanding that his wife get narcotics for her headache.  Patient is eating.  +BM.  No adverse events overnight.  Patient starting complaining of chest pain just prior to examination.    Review of Systems   Constitutional: Negative for fever.   Respiratory: Negative for shortness of breath.    Cardiovascular: Negative for chest pain.   Gastrointestinal: Positive for nausea. Negative for abdominal pain.   Musculoskeletal: Positive for neck pain.   Neurological: Positive for headaches.   All other systems reviewed and are negative.  Otherwise ROS is negative except as mentioned in the HPI.    Objective   Objective   Vital Signs:   Temp:  [97.6 °F (36.4 °C)-98.5 °F (36.9 °C)] 97.8 °F (36.6 °C)  Heart Rate:  [57-75] 75  Resp:  [16-20] 20  BP: (105-114)/(55-70) 110/55  Physical Exam:  General: Alert, well-developed well-nourished female in no acute distress    Head: Normocephalic atraumatic    Eyes: PERRLA, EOMI, nonicteric, conjunctiva normal    ENT: Pink, moist mucous membranes    Neck: Supple, midline neck ttp, trachea midline without lymphadenopathy, JVD, negative nuchal rigidity.      Cardiovascular: RRR  no M/R/G  +2 DP pulses    Respiratory: Nonlabored, symmetrical chest expansion, clear to auscultation bilaterally    Abdomen: Soft, nontender, nondistended,  positive bowel sounds in all 4 quadrants     Extremities: FROM in upper and lower extremities bilaterally. negative for edema/cyanosis.  Negative calf  pain    Skin: Pink/warm/dry.  No rash or lesions noted    Neuro: Alert and oriented to person place time and situation, speech is clear, follows all commands, recent and remote memory intact    Psych: Patient is pleasant and cooperative.  Normal affect.  Negative suicidal ideation or homicidal ideation.    Results Reviewed:  I have personally reviewed current lab, radiology, and data and agree.    Results from last 7 days  Lab Units 03/15/18  1713 03/14/18  2043   WBC 10*3/mm3 5.57 8.17   HEMOGLOBIN g/dL 11.5 12.0   HEMATOCRIT % 35.2 36.2*   PLATELETS 10*3/mm3 116* 144       Results from last 7 days  Lab Units 03/16/18  0556 03/15/18  1713 03/14/18  2043   SODIUM mmol/L  --  140 141   POTASSIUM mmol/L 4.2 3.1* 3.6   CHLORIDE mmol/L  --  108 110   CO2 mmol/L  --  24.0 25.4   BUN mg/dL  --  6* 8   CREATININE mg/dL  --  0.60 0.48   GLUCOSE mg/dL  --  112* 95   CALCIUM mg/dL  --  8.2* 9.2   ALT (SGPT) U/L  --  53* 54*   AST (SGOT) U/L  --  39* 37*     Estimated Creatinine Clearance: 141 mL/min (by C-G formula based on SCr of 0.6 mg/dL).  No results found for: BNP  No results found for: PHART    Microbiology Results Abnormal     None        Imaging Results (last 24 hours)     ** No results found for the last 24 hours. **        I have reviewed the medications.    Assessment/Plan   Assessment / Plan     Hospital Problem List     * (Principal)Intractable headache    Nausea and vomiting    History of migraine headaches    Benign brain tumor    Fibromyalgia        Brief Hospital Course to date:  Susanna Camilo is a 43 y.o. female history of chronic migraines and fibromyalgia presents to [place] emergency room with complaints of intractable headache.  CT scan showed stable meningioma.  Transferred to [place] for possible DHE protocol.    Assessment & Plan:  - continue on DHE protocol; Is clinically improved (pain 5/10).  - LUIS ALBERTO reviewed.  Multiple lortab/percocets scripts for chronic pain and stadol as well; knows she needs  to stop Stadol d/t rebound HA's per neurology.  - K+ replaced  - home when after completion of protocol, likely Monday  - Patient requesting additional pain medicine, but deferred to Dr. Key who decided to give her some short-term low-dose narcotics with the DHE and Phenergan.  --Zanaflex and heating pad have been added to the regimen  --Short term low dose Norco will be prescribed;  NO IV narcotics  --EKG completed for CP;  Normal EKG    --NO PAIN MEDICATION AT DISCHARGE; Dr Dick explained to the patient and her  that the patient will not be discharged with any narcotics.    DVT Prophylaxis:  lovenox    CODE STATUS: Full Code    Disposition: I expect the patient to be discharged when HA tolerable    More than 50% of time spent counseling on current illness and plan of care. Case discussed with: Patient, , Dr Crystal, Dr dick, Dr Key  Total time spent face to face with the patient was 25 minutes.  Total time of the encounter was 40 minutes.    Electronically signed by MIGDALIA Miller, 03/19/18, 10:40 AM.

## 2018-03-19 NOTE — PLAN OF CARE
Problem: Patient Care Overview  Goal: Interprofessional Rounds/Family Conf  Outcome: Ongoing (interventions implemented as appropriate)  Hub cursing, speaking sternly & loudly stating to 5B staff that no one was doing anything to help his wife.  Dr. Resendiz & GWEN arrived to discuss options.

## 2018-03-20 LAB
ANION GAP SERPL CALCULATED.3IONS-SCNC: 11 MMOL/L (ref 3–11)
BUN BLD-MCNC: 8 MG/DL (ref 9–23)
BUN/CREAT SERPL: 13.3 (ref 7–25)
CALCIUM SPEC-SCNC: 8.5 MG/DL (ref 8.7–10.4)
CHLORIDE SERPL-SCNC: 108 MMOL/L (ref 99–109)
CO2 SERPL-SCNC: 26 MMOL/L (ref 20–31)
CREAT BLD-MCNC: 0.6 MG/DL (ref 0.6–1.3)
GFR SERPL CREATININE-BSD FRML MDRD: 109 ML/MIN/1.73
GLUCOSE BLD-MCNC: 156 MG/DL (ref 70–100)
POTASSIUM BLD-SCNC: 2.9 MMOL/L (ref 3.5–5.5)
SODIUM BLD-SCNC: 145 MMOL/L (ref 132–146)

## 2018-03-20 PROCEDURE — 99232 SBSQ HOSP IP/OBS MODERATE 35: CPT | Performed by: NURSE PRACTITIONER

## 2018-03-20 PROCEDURE — 25810000003 DEXTROSE-NACL PER 500 ML: Performed by: PSYCHIATRY & NEUROLOGY

## 2018-03-20 PROCEDURE — 25010000002 PROMETHAZINE PER 50 MG: Performed by: HOSPITALIST

## 2018-03-20 PROCEDURE — 25010000002 ENOXAPARIN PER 10 MG: Performed by: NURSE PRACTITIONER

## 2018-03-20 PROCEDURE — 80048 BASIC METABOLIC PNL TOTAL CA: CPT | Performed by: INTERNAL MEDICINE

## 2018-03-20 PROCEDURE — 25010000002 BUTORPHANOL PER 1 MG: Performed by: PSYCHIATRY & NEUROLOGY

## 2018-03-20 RX ADMIN — DIVALPROEX SODIUM 250 MG: 250 TABLET, DELAYED RELEASE ORAL at 23:20

## 2018-03-20 RX ADMIN — BUTORPHANOL TARTRATE 2 MG: 2 INJECTION, SOLUTION INTRAMUSCULAR; INTRAVENOUS at 21:18

## 2018-03-20 RX ADMIN — BUTORPHANOL TARTRATE 2 MG: 2 INJECTION, SOLUTION INTRAMUSCULAR; INTRAVENOUS at 17:17

## 2018-03-20 RX ADMIN — SERTRALINE HYDROCHLORIDE 100 MG: 100 TABLET ORAL at 21:17

## 2018-03-20 RX ADMIN — BUTORPHANOL TARTRATE 2 MG: 2 INJECTION, SOLUTION INTRAMUSCULAR; INTRAVENOUS at 12:33

## 2018-03-20 RX ADMIN — ENOXAPARIN SODIUM 40 MG: 40 INJECTION SUBCUTANEOUS at 06:28

## 2018-03-20 RX ADMIN — SERTRALINE HYDROCHLORIDE 100 MG: 100 TABLET ORAL at 08:51

## 2018-03-20 RX ADMIN — BUTORPHANOL TARTRATE 2 MG: 2 INJECTION, SOLUTION INTRAMUSCULAR; INTRAVENOUS at 08:51

## 2018-03-20 RX ADMIN — DIVALPROEX SODIUM 250 MG: 250 TABLET, DELAYED RELEASE ORAL at 06:28

## 2018-03-20 RX ADMIN — PROMETHAZINE HYDROCHLORIDE 12.5 MG: 25 INJECTION INTRAMUSCULAR; INTRAVENOUS at 12:33

## 2018-03-20 RX ADMIN — DIVALPROEX SODIUM 250 MG: 250 TABLET, DELAYED RELEASE ORAL at 17:17

## 2018-03-20 RX ADMIN — POTASSIUM CHLORIDE 40 MEQ: 750 CAPSULE, EXTENDED RELEASE ORAL at 21:17

## 2018-03-20 RX ADMIN — POTASSIUM CHLORIDE 40 MEQ: 750 CAPSULE, EXTENDED RELEASE ORAL at 12:33

## 2018-03-20 RX ADMIN — DEXTROSE AND SODIUM CHLORIDE 75 ML/HR: 5; 900 INJECTION, SOLUTION INTRAVENOUS at 04:36

## 2018-03-20 RX ADMIN — PROMETHAZINE HYDROCHLORIDE 12.5 MG: 25 INJECTION INTRAMUSCULAR; INTRAVENOUS at 18:45

## 2018-03-20 RX ADMIN — BUTORPHANOL TARTRATE 2 MG: 2 INJECTION, SOLUTION INTRAMUSCULAR; INTRAVENOUS at 04:36

## 2018-03-20 RX ADMIN — Medication 5 MG: at 21:17

## 2018-03-20 RX ADMIN — BUSPIRONE HYDROCHLORIDE 15 MG: 15 TABLET ORAL at 17:17

## 2018-03-20 RX ADMIN — BUSPIRONE HYDROCHLORIDE 15 MG: 15 TABLET ORAL at 06:28

## 2018-03-20 RX ADMIN — CETIRIZINE HYDROCHLORIDE 10 MG: 10 TABLET, FILM COATED ORAL at 08:51

## 2018-03-20 RX ADMIN — BUSPIRONE HYDROCHLORIDE 15 MG: 15 TABLET ORAL at 23:20

## 2018-03-20 RX ADMIN — POTASSIUM CHLORIDE 40 MEQ: 750 CAPSULE, EXTENDED RELEASE ORAL at 17:17

## 2018-03-20 RX ADMIN — PROMETHAZINE HYDROCHLORIDE 12.5 MG: 25 INJECTION INTRAMUSCULAR; INTRAVENOUS at 06:28

## 2018-03-20 NOTE — PROGRESS NOTES
[facility] Medicine Services  PROGRESS NOTE    Patient Name: Susanna Camilo  : 1975  MRN: [id number]    Date of Admission: 3/15/2018  Length of Stay: 5  Primary Care Physician: MIGDALIA Gooden    Subjective   Subjective   CC:  F/u HA    HPI:  Patient sitting up in bed in NAD eating lunch.  Patient feels better today. Nausea improved and she is able to eat today. Headache improved but still present. No acute events overnight per nursing.     Review of Systems   Constitutional: Negative for fever.   Respiratory: Negative for shortness of breath.    Cardiovascular: Negative for chest pain.   Gastrointestinal: Positive for nausea. Negative for abdominal pain.   Musculoskeletal: Positive for neck pain.   Neurological: Positive for headaches.   All other systems reviewed and are negative.  Otherwise ROS is negative except as mentioned in the HPI.    Objective   Objective   Vital Signs:   Temp:  [97.6 °F (36.4 °C)-98 °F (36.7 °C)] 97.9 °F (36.6 °C)  Heart Rate:  [67-80] 72  Resp:  [16-18] 18  BP: ()/(45-54) 97/51  Physical Exam:  General: Alert, well-developed well-nourished female in no acute distress    Head: Normocephalic atraumatic    Eyes: PERRLA, EOMI, nonicteric, conjunctiva normal    ENT: Pink, moist mucous membranes    Neck: Supple, midline negative nuchal rigidity.      Cardiovascular: RRR  no M/R/G  +2 DP pulses    Respiratory: Nonlabored, symmetrical chest expansion, clear to auscultation bilaterally    Abdomen: Soft, nontender, nondistended,  positive bowel sounds in all 4 quadrants     Extremities: FROM in upper and lower extremities bilaterally. negative for edema/cyanosis.  Negative calf pain    Skin: Pink/warm/dry.  No rash or lesions noted    Neuro: Alert and oriented to person place time and situation, speech is clear, follows all commands, recent and remote memory intact    Psych: Patient is pleasant and cooperative.  Normal affect.  Negative suicidal  ideation or homicidal ideation.    Results Reviewed:  I have personally reviewed current lab, radiology, and data and agree.    Results from last 7 days  Lab Units 03/15/18  1713 03/14/18  2043   WBC 10*3/mm3 5.57 8.17   HEMOGLOBIN g/dL 11.5 12.0   HEMATOCRIT % 35.2 36.2*   PLATELETS 10*3/mm3 116* 144       Results from last 7 days  Lab Units 03/20/18  0538 03/16/18  0556 03/15/18  1713 03/14/18  2043   SODIUM mmol/L 145  --  140 141   POTASSIUM mmol/L 2.9* 4.2 3.1* 3.6   CHLORIDE mmol/L 108  --  108 110   CO2 mmol/L 26.0  --  24.0 25.4   BUN mg/dL 8*  --  6* 8   CREATININE mg/dL 0.60  --  0.60 0.48   GLUCOSE mg/dL 156*  --  112* 95   CALCIUM mg/dL 8.5*  --  8.2* 9.2   ALT (SGPT) U/L  --   --  53* 54*   AST (SGOT) U/L  --   --  39* 37*     Estimated Creatinine Clearance: 141 mL/min (by C-G formula based on SCr of 0.6 mg/dL).  No results found for: BNP  No results found for: PHART    Microbiology Results Abnormal     None        Imaging Results (last 24 hours)     ** No results found for the last 24 hours. **        I have reviewed the medications.    Assessment/Plan   Assessment / Plan     Hospital Problem List     * (Principal)Intractable headache    Nausea and vomiting    History of migraine headaches    Benign brain tumor    Fibromyalgia        Brief Hospital Course to date:  Susanna Camilo is a 43 y.o. female history of chronic migraines and fibromyalgia presents to [place] emergency room with complaints of intractable headache.  CT scan showed stable meningioma.  Transferred to [place] for possible DHE protocol.    Assessment & Plan  - LUIS ALBERTO reviewed.  Multiple lortab/percocets scripts for chronic pain and stadol as well; knows she needs to stop Stadol d/t rebound HA's per neurology.  - K low replace   - DHE protocol complete  -- neurology following   - neurology restarted her stadol, had improvement today, nausea better able to eat.   -- headache still present but improved. Pt does not feel well enough to go  home today.     DVT Prophylaxis:  lovenox    CODE STATUS: Full Code    Disposition: I expect the patient to be discharged 1-2 days   Electronically signed by MIGDALIA Marcus, 03/20/18, 1:29 PM.

## 2018-03-20 NOTE — PLAN OF CARE
Problem: Patient Care Overview  Goal: Plan of Care Review  Outcome: Ongoing (interventions implemented as appropriate)   03/20/18 0532   Plan of Care Review   Progress improving   OTHER   Outcome Summary VSS, medicated with pain and nausea medicine. Pt slept well throughout the night.    Coping/Psychosocial   Plan of Care Reviewed With patient     Goal: Individualization and Mutuality  Outcome: Ongoing (interventions implemented as appropriate)    Goal: Discharge Needs Assessment  Outcome: Ongoing (interventions implemented as appropriate)    Goal: Interprofessional Rounds/Family Conf  Outcome: Ongoing (interventions implemented as appropriate)      Problem: Pain, Acute (Adult)  Goal: Identify Related Risk Factors and Signs and Symptoms  Outcome: Ongoing (interventions implemented as appropriate)    Goal: Acceptable Pain Control/Comfort Level  Outcome: Ongoing (interventions implemented as appropriate)      Problem: Nausea/Vomiting (Adult)  Goal: Identify Related Risk Factors and Signs and Symptoms  Outcome: Ongoing (interventions implemented as appropriate)    Goal: Symptom Relief  Outcome: Ongoing (interventions implemented as appropriate)    Goal: Adequate Hydration  Outcome: Ongoing (interventions implemented as appropriate)

## 2018-03-21 LAB
ALBUMIN SERPL-MCNC: 3.6 G/DL (ref 3.2–4.8)
ALBUMIN/GLOB SERPL: 1.7 G/DL (ref 1.5–2.5)
ALP SERPL-CCNC: 69 U/L (ref 25–100)
ALT SERPL W P-5'-P-CCNC: 23 U/L (ref 7–40)
ANION GAP SERPL CALCULATED.3IONS-SCNC: 10 MMOL/L (ref 3–11)
AST SERPL-CCNC: 18 U/L (ref 0–33)
BILIRUB SERPL-MCNC: 0.1 MG/DL (ref 0.3–1.2)
BUN BLD-MCNC: 5 MG/DL (ref 9–23)
BUN/CREAT SERPL: 8.3 (ref 7–25)
CALCIUM SPEC-SCNC: 8.7 MG/DL (ref 8.7–10.4)
CHLORIDE SERPL-SCNC: 108 MMOL/L (ref 99–109)
CO2 SERPL-SCNC: 28 MMOL/L (ref 20–31)
CREAT BLD-MCNC: 0.6 MG/DL (ref 0.6–1.3)
GFR SERPL CREATININE-BSD FRML MDRD: 109 ML/MIN/1.73
GLOBULIN UR ELPH-MCNC: 2.1 GM/DL
GLUCOSE BLD-MCNC: 104 MG/DL (ref 70–100)
POTASSIUM BLD-SCNC: 4.2 MMOL/L (ref 3.5–5.5)
POTASSIUM BLD-SCNC: 4.2 MMOL/L (ref 3.5–5.5)
PROT SERPL-MCNC: 5.7 G/DL (ref 5.7–8.2)
SODIUM BLD-SCNC: 146 MMOL/L (ref 132–146)

## 2018-03-21 PROCEDURE — 99231 SBSQ HOSP IP/OBS SF/LOW 25: CPT | Performed by: PSYCHIATRY & NEUROLOGY

## 2018-03-21 PROCEDURE — 25010000002 ENOXAPARIN PER 10 MG: Performed by: NURSE PRACTITIONER

## 2018-03-21 PROCEDURE — 80053 COMPREHEN METABOLIC PANEL: CPT | Performed by: INTERNAL MEDICINE

## 2018-03-21 PROCEDURE — 25010000002 BUTORPHANOL PER 1 MG: Performed by: PSYCHIATRY & NEUROLOGY

## 2018-03-21 PROCEDURE — 99231 SBSQ HOSP IP/OBS SF/LOW 25: CPT | Performed by: NURSE PRACTITIONER

## 2018-03-21 PROCEDURE — 25010000002 PROMETHAZINE PER 50 MG: Performed by: HOSPITALIST

## 2018-03-21 PROCEDURE — 25810000003 DEXTROSE-NACL PER 500 ML: Performed by: PSYCHIATRY & NEUROLOGY

## 2018-03-21 RX ADMIN — PROMETHAZINE HYDROCHLORIDE 12.5 MG: 25 INJECTION INTRAMUSCULAR; INTRAVENOUS at 17:22

## 2018-03-21 RX ADMIN — ENOXAPARIN SODIUM 40 MG: 40 INJECTION SUBCUTANEOUS at 05:24

## 2018-03-21 RX ADMIN — SERTRALINE HYDROCHLORIDE 100 MG: 100 TABLET ORAL at 08:15

## 2018-03-21 RX ADMIN — BUSPIRONE HYDROCHLORIDE 15 MG: 15 TABLET ORAL at 23:18

## 2018-03-21 RX ADMIN — BUSPIRONE HYDROCHLORIDE 15 MG: 15 TABLET ORAL at 05:24

## 2018-03-21 RX ADMIN — BUTORPHANOL TARTRATE 2 MG: 2 INJECTION, SOLUTION INTRAMUSCULAR; INTRAVENOUS at 21:22

## 2018-03-21 RX ADMIN — DIVALPROEX SODIUM 250 MG: 250 TABLET, DELAYED RELEASE ORAL at 23:18

## 2018-03-21 RX ADMIN — BUTORPHANOL TARTRATE 2 MG: 2 INJECTION, SOLUTION INTRAMUSCULAR; INTRAVENOUS at 17:22

## 2018-03-21 RX ADMIN — CETIRIZINE HYDROCHLORIDE 10 MG: 10 TABLET, FILM COATED ORAL at 08:15

## 2018-03-21 RX ADMIN — DIVALPROEX SODIUM 250 MG: 250 TABLET, DELAYED RELEASE ORAL at 05:24

## 2018-03-21 RX ADMIN — BUSPIRONE HYDROCHLORIDE 15 MG: 15 TABLET ORAL at 13:22

## 2018-03-21 RX ADMIN — Medication 5 MG: at 21:22

## 2018-03-21 RX ADMIN — BUTORPHANOL TARTRATE 2 MG: 2 INJECTION, SOLUTION INTRAMUSCULAR; INTRAVENOUS at 13:23

## 2018-03-21 RX ADMIN — BUTORPHANOL TARTRATE 2 MG: 2 INJECTION, SOLUTION INTRAMUSCULAR; INTRAVENOUS at 01:18

## 2018-03-21 RX ADMIN — BUTORPHANOL TARTRATE 2 MG: 2 INJECTION, SOLUTION INTRAMUSCULAR; INTRAVENOUS at 09:26

## 2018-03-21 RX ADMIN — SERTRALINE HYDROCHLORIDE 100 MG: 100 TABLET ORAL at 21:22

## 2018-03-21 RX ADMIN — PROMETHAZINE HYDROCHLORIDE 12.5 MG: 25 INJECTION INTRAMUSCULAR; INTRAVENOUS at 08:17

## 2018-03-21 RX ADMIN — DIVALPROEX SODIUM 250 MG: 250 TABLET, DELAYED RELEASE ORAL at 13:23

## 2018-03-21 RX ADMIN — DEXTROSE AND SODIUM CHLORIDE 75 ML/HR: 5; 900 INJECTION, SOLUTION INTRAVENOUS at 15:32

## 2018-03-21 RX ADMIN — BUTORPHANOL TARTRATE 2 MG: 2 INJECTION, SOLUTION INTRAMUSCULAR; INTRAVENOUS at 05:24

## 2018-03-21 RX ADMIN — PROMETHAZINE HYDROCHLORIDE 12.5 MG: 25 INJECTION INTRAMUSCULAR; INTRAVENOUS at 01:17

## 2018-03-21 RX ADMIN — DEXTROSE AND SODIUM CHLORIDE 75 ML/HR: 5; 900 INJECTION, SOLUTION INTRAVENOUS at 04:05

## 2018-03-21 NOTE — PROGRESS NOTES
[facility] Medicine Services  PROGRESS NOTE    Patient Name: Susanna Camilo  : 1975  MRN: [id number]    Date of Admission: 3/15/2018  Length of Stay: 6  Primary Care Physician: MIGDALIA Gooden    Subjective   Subjective   CC:  F/u HA    HPI:  Patient sitting up in bed in NAD.  States her headache has improved to 4/10.  Patient states that she is still nauseated and wants to make sure she can eat before she goes home.  Patient's also stated that her  couldn't get up here to get her, because of his job, so asked if she could stay 1 more night.  Positive BM.    Review of Systems   Constitutional: Negative for fever.   Respiratory: Negative for shortness of breath.    Cardiovascular: Negative for chest pain.   Gastrointestinal: Positive for nausea. Negative for abdominal pain.   Musculoskeletal: Positive for neck pain.   Neurological: Positive for headaches.   All other systems reviewed and are negative.  Otherwise ROS is negative except as mentioned in the HPI.    Objective   Objective   Vital Signs:   Temp:  [98 °F (36.7 °C)-98.5 °F (36.9 °C)] 98.4 °F (36.9 °C)  Heart Rate:  [77-86] 77  Resp:  [16-18] 18  BP: ()/(53-59) 100/55  Physical Exam:  General: Alert, well-developed well-nourished female in no acute distress    Head: Normocephalic atraumatic    Eyes: PERRLA, EOMI, nonicteric, conjunctiva normal    ENT: Pink, moist mucous membranes    Neck: Supple, midline negative nuchal rigidity.      Cardiovascular: RRR  no M/R/G  +2 DP pulses    Respiratory: Nonlabored, symmetrical chest expansion, clear to auscultation bilaterally    Abdomen: Soft, nontender, nondistended,  positive bowel sounds in all 4 quadrants     Extremities: FROM in upper and lower extremities bilaterally. negative for edema/cyanosis.  Negative calf pain    Skin: Pink/warm/dry.  No rash or lesions noted    Neuro: Alert and oriented to person place time and situation, speech is clear, follows  all commands, recent and remote memory intact    Psych: Patient is pleasant and cooperative.  Normal affect.  Negative suicidal ideation or homicidal ideation.    Results Reviewed:  I have personally reviewed current lab, radiology, and data and agree.    Results from last 7 days  Lab Units 03/15/18  1713 03/14/18  2043   WBC 10*3/mm3 5.57 8.17   HEMOGLOBIN g/dL 11.5 12.0   HEMATOCRIT % 35.2 36.2*   PLATELETS 10*3/mm3 116* 144       Results from last 7 days  Lab Units 03/21/18  0541 03/20/18  0538 03/16/18  0556 03/15/18  1713 03/14/18  2043   SODIUM mmol/L 146 145  --  140 141   POTASSIUM mmol/L 4.2  4.2 2.9* 4.2 3.1* 3.6   CHLORIDE mmol/L 108 108  --  108 110   CO2 mmol/L 28.0 26.0  --  24.0 25.4   BUN mg/dL 5* 8*  --  6* 8   CREATININE mg/dL 0.60 0.60  --  0.60 0.48   GLUCOSE mg/dL 104* 156*  --  112* 95   CALCIUM mg/dL 8.7 8.5*  --  8.2* 9.2   ALT (SGPT) U/L 23  --   --  53* 54*   AST (SGOT) U/L 18  --   --  39* 37*     Estimated Creatinine Clearance: 141 mL/min (by C-G formula based on SCr of 0.6 mg/dL).  No results found for: BNP  No results found for: PHART    Microbiology Results Abnormal     None        Imaging Results (last 24 hours)     ** No results found for the last 24 hours. **        I have reviewed the medications.    Assessment/Plan   Assessment / Plan     Hospital Problem List     * (Principal)Intractable headache    Nausea and vomiting    History of migraine headaches    Benign brain tumor    Fibromyalgia        Brief Hospital Course to date:  Susanna Camilo is a 43 y.o. female history of chronic migraines and fibromyalgia presents to [place] emergency room with complaints of intractable headache.  CT scan showed stable meningioma.  Transferred to [place] for possible DHE protocol.    Assessment & Plan  - LUIS ALBERTO reviewed.  Multiple lortab/percocets scripts for chronic pain and stadol as well; knows she needs to stop Stadol d/t rebound HA's per neurology.  - K low replace   - DHE protocol  complete  -- neurology following   - neurology restarted her stadol, had improvement today, nausea better able to eat.   -- headache still present but improved. Pt does not feel well enough to go home today; wants to make sure she can eat and that the nausea will improve; also states that her  can't pick her up due to his job.     DVT Prophylaxis:  lovenox    CODE STATUS: Full Code    Disposition: I expect the patient to be discharged 1-2 days   Electronically signed by MIGDALIA Miller, 03/21/18, 10:56 AM.

## 2018-03-21 NOTE — PROGRESS NOTES
Neurology       Patient Care Team:  MIGDALIA Gooden as PCP - General (Family Medicine)    Chief complaint: Migraine    History:  Pain is down to 4 out of 5.    She is still having some nausea but is eating better.      Past Medical History:   Diagnosis Date   • Abdominal pain    • Abdominal swelling    • Hoyos's disease    • Bipolar 1 disorder    • Brain tumor     R Frontal Lobe per pt   • Brain tumor 2014   • Brain tumor (benign)    • Constipation    • DDD (degenerative disc disease), cervical 05/29/2017   • DDD (degenerative disc disease), cervical    • Diarrhea    • Fibromyalgia    • IBS (irritable bowel syndrome)    • Migraine    • Nausea & vomiting    • PONV (postoperative nausea and vomiting)    • PTSD (post-traumatic stress disorder)    • Rectal bleeding        Vital Signs   Vitals:    03/20/18 1857 03/21/18 0400 03/21/18 0700 03/21/18 1610   BP: 101/53 116/59 100/55 104/52   BP Location: Left arm Left arm     Patient Position: Sitting Sitting     Pulse: 86 79 77 80   Resp: 16 16 18 18   Temp: 98.1 °F (36.7 °C) 98.5 °F (36.9 °C) 98.4 °F (36.9 °C) 98.1 °F (36.7 °C)   TempSrc: Oral Oral Oral Oral   SpO2: 96% 98% 97% 95%   Weight:       Height:           Physical Exam:    General: Good spirits              Neuro: Awake and alert.    Speech is normal.    Face is symmetrical.    Strength is normal.        Results Review:  No new results    Results from last 7 days  Lab Units 03/15/18  1713   WBC 10*3/mm3 5.57   HEMOGLOBIN g/dL 11.5   HEMATOCRIT % 35.2   PLATELETS 10*3/mm3 116*       Results from last 7 days  Lab Units 03/21/18  0541 03/20/18  0538 03/16/18  0556 03/15/18  1713 03/14/18  2043   SODIUM mmol/L 146 145  --  140 141   POTASSIUM mmol/L 4.2  4.2 2.9* 4.2 3.1* 3.6   CHLORIDE mmol/L 108 108  --  108 110   CO2 mmol/L 28.0 26.0  --  24.0 25.4   BUN mg/dL 5* 8*  --  6* 8   CREATININE mg/dL 0.60 0.60  --  0.60 0.48   CALCIUM mg/dL 8.7 8.5*  --  8.2* 9.2   BILIRUBIN mg/dL 0.1*  --   --  0.4 0.6    ALK PHOS U/L 69  --   --  61 63   ALT (SGPT) U/L 23  --   --  53* 54*   AST (SGOT) U/L 18  --   --  39* 37*   GLUCOSE mg/dL 104* 156*  --  112* 95       Imaging Results (last 24 hours)     ** No results found for the last 24 hours. **          Assessment:  Status migraine improved    Plan:  Discharge in a.m.    Comment:  Follow-up with primary neurologist         I discussed the patients findings and my recommendations with patient    Sathish Key MD  03/21/18  4:59 PM

## 2018-03-22 VITALS
RESPIRATION RATE: 18 BRPM | WEIGHT: 163 LBS | DIASTOLIC BLOOD PRESSURE: 57 MMHG | TEMPERATURE: 96 F | OXYGEN SATURATION: 98 % | SYSTOLIC BLOOD PRESSURE: 111 MMHG | HEART RATE: 78 BPM | HEIGHT: 69 IN | BODY MASS INDEX: 24.14 KG/M2

## 2018-03-22 PROCEDURE — 25010000002 BUTORPHANOL PER 1 MG: Performed by: PSYCHIATRY & NEUROLOGY

## 2018-03-22 PROCEDURE — 25010000002 ENOXAPARIN PER 10 MG: Performed by: NURSE PRACTITIONER

## 2018-03-22 PROCEDURE — 99239 HOSP IP/OBS DSCHRG MGMT >30: CPT | Performed by: NURSE PRACTITIONER

## 2018-03-22 PROCEDURE — 25010000002 PROMETHAZINE PER 50 MG: Performed by: HOSPITALIST

## 2018-03-22 PROCEDURE — 63710000001 PROMETHAZINE PER 12.5 MG: Performed by: HOSPITALIST

## 2018-03-22 RX ADMIN — CETIRIZINE HYDROCHLORIDE 10 MG: 10 TABLET, FILM COATED ORAL at 08:09

## 2018-03-22 RX ADMIN — PROMETHAZINE HYDROCHLORIDE 12.5 MG: 12.5 TABLET ORAL at 17:35

## 2018-03-22 RX ADMIN — PROMETHAZINE HYDROCHLORIDE 12.5 MG: 25 INJECTION INTRAMUSCULAR; INTRAVENOUS at 05:39

## 2018-03-22 RX ADMIN — SERTRALINE HYDROCHLORIDE 100 MG: 100 TABLET ORAL at 08:09

## 2018-03-22 RX ADMIN — PROMETHAZINE HYDROCHLORIDE 12.5 MG: 25 INJECTION INTRAMUSCULAR; INTRAVENOUS at 01:24

## 2018-03-22 RX ADMIN — BUTORPHANOL TARTRATE 2 MG: 2 INJECTION, SOLUTION INTRAMUSCULAR; INTRAVENOUS at 09:43

## 2018-03-22 RX ADMIN — BUTORPHANOL TARTRATE 2 MG: 2 INJECTION, SOLUTION INTRAMUSCULAR; INTRAVENOUS at 01:24

## 2018-03-22 RX ADMIN — BUTORPHANOL TARTRATE 2 MG: 2 INJECTION, SOLUTION INTRAMUSCULAR; INTRAVENOUS at 05:39

## 2018-03-22 RX ADMIN — BUSPIRONE HYDROCHLORIDE 15 MG: 15 TABLET ORAL at 05:39

## 2018-03-22 RX ADMIN — ENOXAPARIN SODIUM 40 MG: 40 INJECTION SUBCUTANEOUS at 05:39

## 2018-03-22 RX ADMIN — BUTORPHANOL TARTRATE 2 MG: 2 INJECTION, SOLUTION INTRAMUSCULAR; INTRAVENOUS at 17:35

## 2018-03-22 RX ADMIN — BUTORPHANOL TARTRATE 2 MG: 2 INJECTION, SOLUTION INTRAMUSCULAR; INTRAVENOUS at 13:44

## 2018-03-22 RX ADMIN — DIVALPROEX SODIUM 250 MG: 250 TABLET, DELAYED RELEASE ORAL at 05:39

## 2018-03-22 RX ADMIN — BUSPIRONE HYDROCHLORIDE 15 MG: 15 TABLET ORAL at 13:45

## 2018-03-22 RX ADMIN — DIVALPROEX SODIUM 250 MG: 250 TABLET, DELAYED RELEASE ORAL at 13:45

## 2018-03-22 NOTE — NURSING NOTE
Attempted to make PCP follow up however the office is closed on Thursdays.  Noted to pt on discharge to make this follow up appointment. JEAN-CLAUDE CHAWLA

## 2018-03-22 NOTE — PLAN OF CARE
Problem: Patient Care Overview  Goal: Plan of Care Review  Outcome: Ongoing (interventions implemented as appropriate)   03/22/18 4749   OTHER   Outcome Summary patient denies vomitting today. does complain of mild nausea and neck pain. slept well. vss. has requested several cereals and tolerated them fine with no vomitting.    Coping/Psychosocial   Plan of Care Reviewed With patient     Goal: Individualization and Mutuality  Outcome: Ongoing (interventions implemented as appropriate)    Goal: Discharge Needs Assessment  Outcome: Ongoing (interventions implemented as appropriate)    Goal: Interprofessional Rounds/Family Conf  Outcome: Ongoing (interventions implemented as appropriate)      Problem: Pain, Acute (Adult)  Goal: Identify Related Risk Factors and Signs and Symptoms  Outcome: Ongoing (interventions implemented as appropriate)    Goal: Acceptable Pain Control/Comfort Level  Outcome: Ongoing (interventions implemented as appropriate)      Problem: Nausea/Vomiting (Adult)  Goal: Identify Related Risk Factors and Signs and Symptoms  Outcome: Ongoing (interventions implemented as appropriate)    Goal: Symptom Relief  Outcome: Ongoing (interventions implemented as appropriate)    Goal: Adequate Hydration  Outcome: Ongoing (interventions implemented as appropriate)

## 2018-03-22 NOTE — PROGRESS NOTES
"Adult Nutrition  Assessment/PES    Patient Name:  Susanna Camilo  YOB: 1975  MRN: [id number]  Admit Date:  3/15/2018    Assessment Date:  3/22/2018    Comments:   Reason for Assessment   Reason For Assessment --   Length of stay- 15 minutes   Principal Problem:    Intractable headache  Active Problems:    Nausea and vomiting    History of migraine headaches    Benign brain tumor    Fibromyalgia    Labs/Procedures/Meds   Lab Results Reviewed Reviewed    Results from last 7 days  Lab Units 03/21/18  0541   SODIUM mmol/L 146   POTASSIUM mmol/L 4.2  4.2   CHLORIDE mmol/L 108   CO2 mmol/L 28.0   BUN mg/dL 5*   CREATININE mg/dL 0.60   CALCIUM mg/dL 8.7   BILIRUBIN mg/dL 0.1*   ALK PHOS U/L 69   ALT (SGPT) U/L 23   AST (SGOT) U/L 18   GLUCOSE mg/dL 104*           Nutrition Prescription PO   Current PO Diet Regular      PO Evaluation   Number of Meals 3   % PO Intake 83           Adult Nutrition Assessment     Row Name 03/22/18 0914       Anthropometrics    Height 175.3 cm (69.02\")    Weight 73.9 kg (163 lb)       Ideal Body Weight (IBW)    Ideal Body Weight (IBW) (kg) 66.47    % Ideal Body Weight 111.23       Body Mass Index (BMI)    BMI (kg/m2) 24.11       Row Name 03/22/18 0913             Row Name 03/22/18 0912                      Problem/Interventions:        Problem 1     Row Name 03/22/18 0913       Nutrition Diagnoses Problem 1    Problem 1 No Nutrition Diagnosis at this Time                    Intervention Goal     Row Name 03/22/18 0913       Intervention Goal    General Nutrition support treatment    PO Continue positive trend            Nutrition Intervention     Row Name 03/22/18 0914       Nutrition Intervention    RD/Tech Action Care plan reviewd;Follow Tx progress. Continue to follow per protocol.   assisting pt. with menu selections.            Electronically signed by:  Tracee Leonard RD  03/22/18 9:15 AM  "

## 2018-03-22 NOTE — PLAN OF CARE
Problem: Patient Care Overview  Goal: Plan of Care Review  Outcome: Ongoing (interventions implemented as appropriate)   03/21/18 1439 03/22/18 0330 03/22/18 1015   Plan of Care Review   Progress improving --  --    OTHER   Outcome Summary --  patient denies vomitting today. does complain of mild nausea and neck pain. slept well. vss. has requested several cereals and tolerated them fine with no vomitting.  --    Coping/Psychosocial   Plan of Care Reviewed With --  --  patient     Goal: Individualization and Mutuality  Outcome: Ongoing (interventions implemented as appropriate)   03/22/18 1210   Individualization   Patient Specific Goals (Include Timeframe) pain management     Goal: Discharge Needs Assessment  Outcome: Ongoing (interventions implemented as appropriate)   03/16/18 1553   Discharge Needs Assessment   Readmission Within the Last 30 Days no previous admission in last 30 days   Concerns to be Addressed no discharge needs identified   Patient/Family Anticipates Transition to home   Patient/Family Anticipated Services at Transition none   Transportation Concerns car, none   Transportation Anticipated family or friend will provide   Anticipated Changes Related to Illness none   Disability   Equipment Currently Used at Home none       Problem: Pain, Acute (Adult)  Intervention: Monitor and Manage Analgesia   03/22/18 0753   Safety Management   Medication Review/Management medications reviewed     Intervention: Mutually Develop/Implement Acute Pain Management Plan   03/20/18 2000 03/22/18 0540   Promote Oxygenation/Perfusion   Pain Management Interventions --  pillow support provided;position adjusted;see MAR;relaxation techniques promoted   Cognitive Interventions   Sensory Stimulation Regulation care clustered;lighting decreased --        Goal: Identify Related Risk Factors and Signs and Symptoms  Outcome: Ongoing (interventions implemented as appropriate)   03/17/18 1634 03/18/18 1618   Pain, Acute (Adult)    Related Risk Factors (Acute Pain) --  fear;positioning;persistent pain   Signs and Symptoms (Acute Pain) fatigue/weakness --      Goal: Acceptable Pain Control/Comfort Level  Outcome: Ongoing (interventions implemented as appropriate)   03/21/18 0328   Pain, Acute (Adult)   Acceptable Pain Control/Comfort Level making progress toward outcome       Problem: Nausea/Vomiting (Adult)  Intervention: Minimize Nausea Triggers/Manage Symptoms   03/21/18 2000 03/22/18 0540   Monitor/Manage Hypovolemia   Nausea/Vomiting Interventions --  slow deep breathing encouraged;stimuli minimized;other (see comments)  (medicated; see mar )   Coping/Psychosocial Interventions   Environmental Support calm environment promoted;caregiver consistency promoted;distractions minimized;environmental consistency promoted;personal routine supported;rest periods encouraged --        Goal: Identify Related Risk Factors and Signs and Symptoms   03/18/18 0446   Nausea/Vomiting (Adult)   Signs and Symptoms (Nausea/Vomiting) abdominal discomfort/pain;report of emesis;retching/gagging     Goal: Adequate Hydration  Outcome: Ongoing (interventions implemented as appropriate)   03/22/18 1210   Nausea/Vomiting (Adult)   Adequate Hydration making progress toward outcome

## 2018-03-22 NOTE — NURSING NOTE
Follow up appointment was made with Dr. Simpson office.  Was later notified from the office that the appointment was cancelled by the Physician himself and pt was discharge from returning to their office.  I notified Vanesa NEGRON of this who suggested asking pt to follow up with a neurologist here at [facility].  Spoke with pt about the the office cancelling her appt and she states she understands and didn't want to go into detail about it.  I asked the pt if she would be willing to see a neurologist here and she declined.  Pt states she will find her own neurologist and make her own appt.  Notified Vanesa NEGRON of this and she says this is acceptable and pt can still be discharged. JEAN-CLAUDE CHAWLA

## 2018-03-22 NOTE — PAYOR COMM NOTE
"Lili Dave RN Utilization Review [phone]  Fax # [phone]  Ref # [id number]        Jes Darden (43 y.o. Female)     Date of Birth Social Security Number Address Home Phone MRN    1975  [address] [place] [address] [phone] [id number]    Gnosticism Marital Status          None        Admission Date Admission Type Admitting Provider Attending Provider Department, Room/Bed    3/15/18 Urgent Mu Resendiz MD West, Christopher R, MD [facility] 5B GYN, N540/1    Discharge Date Discharge Disposition Discharge Destination         Home or Self Care              Attending Provider:  Mu Resendiz MD    Allergies:  Ativan [Lorazepam], Sulfa Antibiotics, Compazine [Prochlorperazine Edisylate], Demerol [Meperidine], Droperidol, Metoclopramide, Toradol [Ketorolac Tromethamine], Zofran [Ondansetron Hcl]    Isolation:  None   Infection:  None   Code Status:  FULL    Ht:  175.3 cm (69.02\")   Wt:  73.9 kg (163 lb)    Admission Cmt:  None   Principal Problem:  Intractable headache [R51]                 Active Insurance as of 3/15/2018     Primary Coverage     Payor Plan Insurance Group Employer/Plan Group    [facility]      Payor Plan Address Payor Plan Phone Number Effective From Effective To    [address] [address]  [address]     [place] [address]       Subscriber Name Subscriber Birth Date Member ID       JES DARDEN 1975 [id number]                 Emergency Contacts      (Rel.) Home Phone Work Phone Mobile Phone    Odin Cardoza (Spouse) -- -- [phone]               Discharge Summary      MIGDALIA Abraham at 3/22/2018  9:25 AM              [facility] Medicine Services  DISCHARGE SUMMARY    Patient Name: Jes Darden  : 1975  MRN: [id number]    Date of Admission: 3/15/2018  Date of Discharge:  3/22/2018  Primary Care Physician: MIGDALIA Gooden    Consults "     Date and Time Order Name Status Description    3/15/2018 1702 Inpatient Consult to Neurology Completed         Hospital Course     Presenting Problem:   Intractable headache [R51]    Active Hospital Problems (** Indicates Principal Problem)    Diagnosis Date Noted   • **Intractable headache [R51] 09/13/2016   • History of migraine headaches [Z86.69] 03/15/2018   • Benign brain tumor [D33.2] 03/15/2018   • Fibromyalgia [M79.7] 03/15/2018   • Nausea and vomiting [R11.2] 09/11/2017      Resolved Hospital Problems    Diagnosis Date Noted Date Resolved   No resolved problems to display.      Hospital Course:  Susanna Camilo is a 43 y.o. female with history of chronic migraines and fibromyalgia presented to [place] emergency room with complaints of intractable headache.  CT scan showed stable meningioma.  Patient was transferred to [facility] for DHE protocol.  She was admitted by the hospital medicine service further evaluation and management.  Neurology was consulted and followed.  Patient received DHE protocol with improvement of headache.  Neurology restarted her Stadol and patient states headache improved to 4/10.  Patient continues to have intermittent nausea but no vomiting in 24 hours.  Patient improved clinically and ready for discharge home today.  She will follow-up with her primary neurologist at home.  Patient will be discharged on current home medication regimen.  Discharge plans and instructions were reviewed with patient she verbalized understanding.  Patient will see PCP within 1 week.        Day of Discharge     HPI:   Patient sitting up in bed without any new complaints or issues.  States headache much improved and rates 4/10 today.  Still with intermittent nausea bed eating and no vomiting in 24 hours.  States she feels ready to go home.  Denies chest pain, shortness breath, or abdominal pain.    Review of Systems  Gen- No fevers, chills  CV- No chest pain, palpitations  Resp- No cough,  dyspnea  GI- + nausea, no vomiting, diarrhea, abd pain    Otherwise ROS is negative except as mentioned in the HPI.    Vital Signs:   Temp:  [98 °F (36.7 °C)-98.7 °F (37.1 °C)] 98.3 °F (36.8 °C)  Heart Rate:  [77-80] 79  Resp:  [18] 18  BP: (102-118)/(52-67) 118/62     Physical Exam:  Constitutional: No acute distress, awake, alert  Respiratory: Clear to auscultation bilaterally, respiratory effort normal   Cardiovascular: RRR, no murmurs, rubs, or gallops, palpable pedal pulses bilaterally  Gastrointestinal: Positive bowel sounds, soft, nontender, nondistended  Musculoskeletal: No bilateral ankle edema  Psychiatric: Appropriate affect, cooperative  Neurologic: Oriented x 3, strength symmetric in all extremities, Cranial Nerves grossly intact to confrontation, speech clear  Skin: No rashes    Pertinent  and/or Most Recent Results       Results from last 7 days  Lab Units 03/21/18  0541 03/20/18  0538 03/16/18  0556 03/15/18  1713   WBC 10*3/mm3  --   --   --  5.57   HEMOGLOBIN g/dL  --   --   --  11.5   HEMATOCRIT %  --   --   --  35.2   PLATELETS 10*3/mm3  --   --   --  116*   SODIUM mmol/L 146 145  --  140   POTASSIUM mmol/L 4.2  4.2 2.9* 4.2 3.1*   CHLORIDE mmol/L 108 108  --  108   CO2 mmol/L 28.0 26.0  --  24.0   BUN mg/dL 5* 8*  --  6*   CREATININE mg/dL 0.60 0.60  --  0.60   GLUCOSE mg/dL 104* 156*  --  112*   CALCIUM mg/dL 8.7 8.5*  --  8.2*       Results from last 7 days  Lab Units 03/21/18  0541 03/15/18  1713   BILIRUBIN mg/dL 0.1* 0.4   ALK PHOS U/L 69 61   ALT (SGPT) U/L 23 53*   AST (SGOT) U/L 18 39*         Brief Urine Lab Results  (Last result in the past 365 days)      Color   Clarity   Blood   Leuk Est   Nitrite   Protein   CREAT   Urine HCG        03/14/18 2221 Yellow Clear Negative Trace(A) Negative Negative         03/14/18 2221               Negative           Microbiology Results Abnormal     None          Imaging Results (all)     None          Discharge Details      Susanna Camilo  Medication Instructions VINCENT:[id number]    Printed on:03/22/18 0925   Medication Information                      busPIRone (BUSPAR) 15 MG tablet  Take 15 mg by mouth 3 (three) times a day                butorphanol (STADOL) 10 MG/ML nasal spray  1 spray into each nostril Daily.             cetirizine (zyrTEC) 10 MG tablet  Take 1 tablet by mouth Daily.             divalproex (DEPAKOTE) 500 MG DR tablet  Take 250 mg by mouth 3 (Three) Times a Day.             pantoprazole (PROTONIX) 40 MG EC tablet  Take 40 mg by mouth Daily As Needed.             promethazine (PHENERGAN) 25 MG tablet  Take 1 tablet by mouth Every 6 (Six) Hours As Needed for Nausea or Vomiting.             sertraline (ZOLOFT) 100 MG tablet  Take 100 mg by mouth 2 (Two) Times a Day.             SUMAtriptan (IMITREX) 6 MG/0.5ML solution injection  Inject 6 mg under the skin 1 (One) Time.               Discharge Disposition:  Home or Self Care    Discharge Diet:  Diet Instructions     Diet: Regular       Discharge Diet:  Regular        Discharge Activity:   Activity Instructions     Activity as Tolerated             Future Appointments  Date Time Provider Department Center   4/3/2018 1:10 PM Mu Blunt MD [facility] None   4/25/2018 1:40 PM SHIRLEY Hastings MD [facility] None       Additional Instructions for the Follow-ups that You Need to Schedule     Discharge Follow-up with PCP    As directed      Follow Up Details:  1 week for hospital follow up         Discharge Follow-up with Specialty: Dr. Sunshine; 2 Weeks    As directed      Specialty:  Dr. Sunshine    Follow Up:  2 Weeks             Time Spent on Discharge:  35 minutes    Electronically signed by MIGDALIA Abraham, 03/22/18, 9:25 AM.        Electronically signed by MIGDALIA Abraham at 3/22/2018  9:31 AM       Discharge Order     Start     Ordered    03/22/18 0925  Discharge patient  Once     Expected Discharge Date:  03/22/18    Discharge Disposition:  Home or Self Care         03/22/18 0925

## 2018-03-22 NOTE — DISCHARGE SUMMARY
[facility] Hospital Medicine Services  DISCHARGE SUMMARY    Patient Name: Susanna Camilo  : 1975  MRN: [id number]    Date of Admission: 3/15/2018  Date of Discharge:  3/22/2018  Primary Care Physician: MIGDALIA Gooden    Consults     Date and Time Order Name Status Description    3/15/2018 1703 Inpatient Consult to Neurology Completed         Hospital Course     Presenting Problem:   Intractable headache [R51]    Active Hospital Problems (** Indicates Principal Problem)    Diagnosis Date Noted   • **Intractable headache [R51] 2016   • History of migraine headaches [Z86.69] 03/15/2018   • Benign brain tumor [D33.2] 03/15/2018   • Fibromyalgia [M79.7] 03/15/2018   • Nausea and vomiting [R11.2] 2017      Resolved Hospital Problems    Diagnosis Date Noted Date Resolved   No resolved problems to display.      Hospital Course:  Susanna Camilo is a 43 y.o. female with history of chronic migraines and fibromyalgia presented to [place] emergency room with complaints of intractable headache.  CT scan showed stable meningioma.  Patient was transferred to [facility] for DHE protocol.  She was admitted by the hospital medicine service further evaluation and management.  Neurology was consulted and followed.  Patient received DHE protocol with improvement of headache.  Neurology restarted her Stadol and patient states headache improved to 4/10.  Patient continues to have intermittent nausea but no vomiting in 24 hours.  Patient improved clinically and ready for discharge home today.  She will follow-up with her primary neurologist at home.  Patient will be discharged on current home medication regimen.  Discharge plans and instructions were reviewed with patient she verbalized understanding.  Patient will see PCP within 1 week.        Day of Discharge     HPI:   Patient sitting up in bed without any new complaints or issues.  States headache much improved and rates 4/10  today.  Still with intermittent nausea bed eating and no vomiting in 24 hours.  States she feels ready to go home.  Denies chest pain, shortness breath, or abdominal pain.    Review of Systems  Gen- No fevers, chills  CV- No chest pain, palpitations  Resp- No cough, dyspnea  GI- + nausea, no vomiting, diarrhea, abd pain    Otherwise ROS is negative except as mentioned in the HPI.    Vital Signs:   Temp:  [98 °F (36.7 °C)-98.7 °F (37.1 °C)] 98.3 °F (36.8 °C)  Heart Rate:  [77-80] 79  Resp:  [18] 18  BP: (102-118)/(52-67) 118/62     Physical Exam:  Constitutional: No acute distress, awake, alert  Respiratory: Clear to auscultation bilaterally, respiratory effort normal   Cardiovascular: RRR, no murmurs, rubs, or gallops, palpable pedal pulses bilaterally  Gastrointestinal: Positive bowel sounds, soft, nontender, nondistended  Musculoskeletal: No bilateral ankle edema  Psychiatric: Appropriate affect, cooperative  Neurologic: Oriented x 3, strength symmetric in all extremities, Cranial Nerves grossly intact to confrontation, speech clear  Skin: No rashes    Pertinent  and/or Most Recent Results       Results from last 7 days  Lab Units 03/21/18  0541 03/20/18  0538 03/16/18  0556 03/15/18  1713   WBC 10*3/mm3  --   --   --  5.57   HEMOGLOBIN g/dL  --   --   --  11.5   HEMATOCRIT %  --   --   --  35.2   PLATELETS 10*3/mm3  --   --   --  116*   SODIUM mmol/L 146 145  --  140   POTASSIUM mmol/L 4.2  4.2 2.9* 4.2 3.1*   CHLORIDE mmol/L 108 108  --  108   CO2 mmol/L 28.0 26.0  --  24.0   BUN mg/dL 5* 8*  --  6*   CREATININE mg/dL 0.60 0.60  --  0.60   GLUCOSE mg/dL 104* 156*  --  112*   CALCIUM mg/dL 8.7 8.5*  --  8.2*       Results from last 7 days  Lab Units 03/21/18  0541 03/15/18  1713   BILIRUBIN mg/dL 0.1* 0.4   ALK PHOS U/L 69 61   ALT (SGPT) U/L 23 53*   AST (SGOT) U/L 18 39*         Brief Urine Lab Results  (Last result in the past 365 days)      Color   Clarity   Blood   Leuk Est   Nitrite   Protein   CREAT    Urine HCG        03/14/18 2221 Yellow Clear Negative Trace(A) Negative Negative         03/14/18 2221               Negative           Microbiology Results Abnormal     None          Imaging Results (all)     None          Discharge Details      Susanna Camilo   Home Medication Instructions VINCENT:[id number]    Printed on:03/22/18 0915   Medication Information                      busPIRone (BUSPAR) 15 MG tablet  Take 15 mg by mouth 3 (three) times a day                butorphanol (STADOL) 10 MG/ML nasal spray  1 spray into each nostril Daily.             cetirizine (zyrTEC) 10 MG tablet  Take 1 tablet by mouth Daily.             divalproex (DEPAKOTE) 500 MG DR tablet  Take 250 mg by mouth 3 (Three) Times a Day.             pantoprazole (PROTONIX) 40 MG EC tablet  Take 40 mg by mouth Daily As Needed.             promethazine (PHENERGAN) 25 MG tablet  Take 1 tablet by mouth Every 6 (Six) Hours As Needed for Nausea or Vomiting.             sertraline (ZOLOFT) 100 MG tablet  Take 100 mg by mouth 2 (Two) Times a Day.             SUMAtriptan (IMITREX) 6 MG/0.5ML solution injection  Inject 6 mg under the skin 1 (One) Time.               Discharge Disposition:  Home or Self Care    Discharge Diet:  Diet Instructions     Diet: Regular       Discharge Diet:  Regular        Discharge Activity:   Activity Instructions     Activity as Tolerated             Future Appointments  Date Time Provider Department Center   4/3/2018 1:10 PM Mu Blunt MD [facility] None   4/25/2018 1:40 PM SHIRLEY Hastings MD [facility] None       Additional Instructions for the Follow-ups that You Need to Schedule     Discharge Follow-up with PCP    As directed      Follow Up Details:  1 week for hospital follow up         Discharge Follow-up with Specialty: Dr. Sunshine; 2 Weeks    As directed      Specialty:  Dr. Sunshine    Follow Up:  2 Weeks             Time Spent on Discharge:  35 minutes    Electronically signed by MIGDALIA Abraham,  03/22/18, 9:25 AM.    Patient care was provided by aprn. I was available for questions

## 2018-03-23 ENCOUNTER — HOSPITAL ENCOUNTER (EMERGENCY)
Facility: HOSPITAL | Age: 43
Discharge: HOME | End: 2018-03-23
Attending: FAMILY MEDICINE | Admitting: FAMILY MEDICINE

## 2018-03-23 ENCOUNTER — APPOINTMENT (OUTPATIENT)
Dept: CT IMAGING | Facility: HOSPITAL | Age: 43
End: 2018-03-23

## 2018-03-23 VITALS
BODY MASS INDEX: 22.66 KG/M2 | SYSTOLIC BLOOD PRESSURE: 111 MMHG | HEIGHT: 69 IN | HEART RATE: 79 BPM | TEMPERATURE: 97.4 F | WEIGHT: 153 LBS | OXYGEN SATURATION: 98 % | RESPIRATION RATE: 18 BRPM | DIASTOLIC BLOOD PRESSURE: 76 MMHG

## 2018-03-23 DIAGNOSIS — R51.9 CHRONIC NONINTRACTABLE HEADACHE, UNSPECIFIED HEADACHE TYPE: Primary | ICD-10-CM

## 2018-03-23 DIAGNOSIS — G89.29 CHRONIC NONINTRACTABLE HEADACHE, UNSPECIFIED HEADACHE TYPE: Primary | ICD-10-CM

## 2018-03-23 LAB
ALBUMIN SERPL-MCNC: 3.8 G/DL (ref 3.5–5)
ALBUMIN/GLOB SERPL: 1.5 G/DL (ref 1.5–2.5)
ALP SERPL-CCNC: 69 U/L (ref 35–104)
ALT SERPL W P-5'-P-CCNC: 35 U/L (ref 10–36)
ANION GAP SERPL CALCULATED.3IONS-SCNC: 5.6 MMOL/L (ref 3.6–11.2)
AST SERPL-CCNC: 34 U/L (ref 10–30)
BASOPHILS # BLD AUTO: 0.01 10*3/MM3 (ref 0–0.3)
BASOPHILS NFR BLD AUTO: 0.2 % (ref 0–2)
BILIRUB SERPL-MCNC: 0.2 MG/DL (ref 0.2–1.8)
BUN BLD-MCNC: 6 MG/DL (ref 7–21)
BUN/CREAT SERPL: 11.1 (ref 7–25)
CALCIUM SPEC-SCNC: 9.1 MG/DL (ref 7.7–10)
CHLORIDE SERPL-SCNC: 104 MMOL/L (ref 99–112)
CO2 SERPL-SCNC: 28.4 MMOL/L (ref 24.3–31.9)
CREAT BLD-MCNC: 0.54 MG/DL (ref 0.43–1.29)
DEPRECATED RDW RBC AUTO: 43 FL (ref 37–54)
EOSINOPHIL # BLD AUTO: 0.17 10*3/MM3 (ref 0–0.7)
EOSINOPHIL NFR BLD AUTO: 3 % (ref 0–5)
ERYTHROCYTE [DISTWIDTH] IN BLOOD BY AUTOMATED COUNT: 13.7 % (ref 11.5–14.5)
GFR SERPL CREATININE-BSD FRML MDRD: 123 ML/MIN/1.73
GLOBULIN UR ELPH-MCNC: 2.5 GM/DL
GLUCOSE BLD-MCNC: 94 MG/DL (ref 70–110)
HCT VFR BLD AUTO: 39.9 % (ref 37–47)
HGB BLD-MCNC: 12.9 G/DL (ref 12–16)
IMM GRANULOCYTES # BLD: 0.03 10*3/MM3 (ref 0–0.03)
IMM GRANULOCYTES NFR BLD: 0.5 % (ref 0–0.5)
LYMPHOCYTES # BLD AUTO: 2.07 10*3/MM3 (ref 1–3)
LYMPHOCYTES NFR BLD AUTO: 36.7 % (ref 21–51)
MCH RBC QN AUTO: 28.6 PG (ref 27–33)
MCHC RBC AUTO-ENTMCNC: 32.3 G/DL (ref 33–37)
MCV RBC AUTO: 88.5 FL (ref 80–94)
MONOCYTES # BLD AUTO: 0.64 10*3/MM3 (ref 0.1–0.9)
MONOCYTES NFR BLD AUTO: 11.3 % (ref 0–10)
NEUTROPHILS # BLD AUTO: 2.72 10*3/MM3 (ref 1.4–6.5)
NEUTROPHILS NFR BLD AUTO: 48.3 % (ref 30–70)
OSMOLALITY SERPL CALC.SUM OF ELEC: 273 MOSM/KG (ref 273–305)
PLATELET # BLD AUTO: 127 10*3/MM3 (ref 130–400)
PMV BLD AUTO: 11.4 FL (ref 6–10)
POTASSIUM BLD-SCNC: 3.9 MMOL/L (ref 3.5–5.3)
PROT SERPL-MCNC: 6.3 G/DL (ref 6–8)
RBC # BLD AUTO: 4.51 10*6/MM3 (ref 4.2–5.4)
SODIUM BLD-SCNC: 138 MMOL/L (ref 135–153)
WBC NRBC COR # BLD: 5.64 10*3/MM3 (ref 4.5–12.5)

## 2018-03-23 PROCEDURE — 96376 TX/PRO/DX INJ SAME DRUG ADON: CPT

## 2018-03-23 PROCEDURE — 70496 CT ANGIOGRAPHY HEAD: CPT

## 2018-03-23 PROCEDURE — 25010000002 PROMETHAZINE PER 50 MG: Performed by: PHYSICIAN ASSISTANT

## 2018-03-23 PROCEDURE — 80053 COMPREHEN METABOLIC PANEL: CPT | Performed by: PHYSICIAN ASSISTANT

## 2018-03-23 PROCEDURE — 85025 COMPLETE CBC W/AUTO DIFF WBC: CPT | Performed by: PHYSICIAN ASSISTANT

## 2018-03-23 PROCEDURE — 96374 THER/PROPH/DIAG INJ IV PUSH: CPT

## 2018-03-23 PROCEDURE — 96375 TX/PRO/DX INJ NEW DRUG ADDON: CPT

## 2018-03-23 PROCEDURE — 25010000002 HEPARIN FLUSH (PORCINE) 100 UNIT/ML SOLUTION: Performed by: FAMILY MEDICINE

## 2018-03-23 PROCEDURE — 0 IOPAMIDOL PER 1 ML: Performed by: FAMILY MEDICINE

## 2018-03-23 PROCEDURE — 96361 HYDRATE IV INFUSION ADD-ON: CPT

## 2018-03-23 PROCEDURE — 25010000002 BUTORPHANOL PER 1 MG: Performed by: PHYSICIAN ASSISTANT

## 2018-03-23 PROCEDURE — 70496 CT ANGIOGRAPHY HEAD: CPT | Performed by: RADIOLOGY

## 2018-03-23 PROCEDURE — 99283 EMERGENCY DEPT VISIT LOW MDM: CPT

## 2018-03-23 RX ORDER — PROMETHAZINE HYDROCHLORIDE 25 MG/ML
12.5 INJECTION, SOLUTION INTRAMUSCULAR; INTRAVENOUS ONCE
Status: COMPLETED | OUTPATIENT
Start: 2018-03-23 | End: 2018-03-23

## 2018-03-23 RX ORDER — SODIUM CHLORIDE 0.9 % (FLUSH) 0.9 %
10 SYRINGE (ML) INJECTION AS NEEDED
Status: DISCONTINUED | OUTPATIENT
Start: 2018-03-23 | End: 2018-03-23 | Stop reason: HOSPADM

## 2018-03-23 RX ADMIN — PROMETHAZINE HYDROCHLORIDE 12.5 MG: 25 INJECTION INTRAMUSCULAR; INTRAVENOUS at 12:04

## 2018-03-23 RX ADMIN — BUTORPHANOL TARTRATE 2 MG: 2 INJECTION, SOLUTION INTRAMUSCULAR; INTRAVENOUS at 09:54

## 2018-03-23 RX ADMIN — IOPAMIDOL 90 ML: 755 INJECTION, SOLUTION INTRAVENOUS at 10:30

## 2018-03-23 RX ADMIN — BUTORPHANOL TARTRATE 2 MG: 2 INJECTION, SOLUTION INTRAMUSCULAR; INTRAVENOUS at 12:04

## 2018-03-23 RX ADMIN — SODIUM CHLORIDE 1000 ML: 9 INJECTION, SOLUTION INTRAVENOUS at 09:51

## 2018-03-23 RX ADMIN — HEPARIN SODIUM (PORCINE) LOCK FLUSH IV SOLN 100 UNIT/ML 300 UNITS: 100 SOLUTION at 12:15

## 2018-03-23 RX ADMIN — PROMETHAZINE HYDROCHLORIDE 12.5 MG: 25 INJECTION INTRAMUSCULAR; INTRAVENOUS at 09:52

## 2018-03-23 NOTE — ED PROVIDER NOTES
Subjective     Headache   Pain location:  Generalized  Quality:  Stabbing  Radiates to:  L neck and R neck  Onset quality:  Gradual  Duration:  1 week  Timing:  Intermittent  Progression:  Waxing and waning  Chronicity:  Recurrent  Similar to prior headaches: yes    Context comment:  Patient was recently admitted to [facility] for DHE protocol..  She had a CT though was told that she needed a CTA.  She has an appointment with neurology and [place] on Tuesday.  No fever.  Relieved by:  Nothing  Ineffective treatments:  DHE  Associated symptoms: nausea and vomiting    Associated symptoms: no abdominal pain and no fever        Review of Systems   Constitutional: Negative.  Negative for fever.   Respiratory: Negative.    Cardiovascular: Negative.  Negative for chest pain.   Gastrointestinal: Positive for nausea and vomiting. Negative for abdominal pain.   Endocrine: Negative.    Genitourinary: Negative.  Negative for dysuria.   Skin: Negative.    Neurological: Positive for headaches.   Psychiatric/Behavioral: Negative.    All other systems reviewed and are negative.      Past Medical History:   Diagnosis Date   • Abdominal pain    • Abdominal swelling    • Hoyos's disease    • Bipolar 1 disorder    • Brain tumor     R Frontal Lobe per pt   • Brain tumor 2014   • Brain tumor (benign)    • Constipation    • DDD (degenerative disc disease), cervical 05/29/2017   • DDD (degenerative disc disease), cervical    • Diarrhea    • Fibromyalgia    • IBS (irritable bowel syndrome)    • Migraine    • Nausea & vomiting    • PONV (postoperative nausea and vomiting)    • PTSD (post-traumatic stress disorder)    • Rectal bleeding        Allergies   Allergen Reactions   • Ativan [Lorazepam] Hallucinations     confusion   • Sulfa Antibiotics Shortness Of Breath and Swelling   • Compazine [Prochlorperazine Edisylate] Hives   • Demerol [Meperidine] Hives   • Droperidol Itching   • Metoclopramide Swelling   • Toradol [Ketorolac  Tromethamine] Hives and Itching   • Zofran [Ondansetron Hcl] Rash       Past Surgical History:   Procedure Laterality Date   • ANAL SCOPE N/A 7/28/2016    Procedure: ANAL SCOPE;  Surgeon: Kael Lopez MD;  Location: [facility] OR;  Service:    • APPENDECTOMY     • COLONOSCOPY N/A 6/30/2016    Procedure: COLONOSCOPY  CPTCODE:98390;  Surgeon: Jose Antonio Belle III, MD;  Location: [facility] OR;  Service:    • COLONOSCOPY N/A 7/7/2016    Procedure: COLONOSCOPY (72582) CPT;  Surgeon: Jose Antonio Belle III, MD;  Location: [facility] OR;  Service:    • CYSTOSCOPY RETROGRADE PYELOGRAM Bilateral 4/28/2017    Procedure: CYSTOSCOPY RETROGRADE PYELOGRAM;  Surgeon: Mu Blunt MD;  Location: [facility] OR;  Service:    • ENDOSCOPY N/A 6/30/2016    Procedure: ESOPHAGOGASTRODUODENOSCOPY WITH BIOPSY  CPTCODE:06454;  Surgeon: Jose Antonio Belle III, MD;  Location: [facility] OR;  Service:    • HEMORRHOIDECTOMY N/A 7/28/2016    Procedure: HEMORRHOID STAPLING;  Surgeon: Kael Lopez MD;  Location: [facility] OR;  Service:    • HYSTERECTOMY     • KNEE SURGERY     • LAPAROSCOPIC SALPINGOOPHERECTOMY     • PORTACATH PLACEMENT N/A 7/28/2017    Procedure: INSERTION OF PORTACATH;  Surgeon: Celso Arredondo MD;  Location: [facility] OR;  Service:    • SHOULDER SURGERY      3 times       Family History   Problem Relation Age of Onset   • Crohn's disease Other    • Hypertension Other    • Diabetes Other    • Irritable bowel syndrome Other        Social History     Social History   • Marital status:      Social History Main Topics   • Smoking status: Former Smoker     Packs/day: 1.00     Years: 20.00     Quit date: 11/1/2015   • Smokeless tobacco: Never Used   • Alcohol use No   • Drug use:      Types: Marijuana      Comment: for pain   • Sexual activity: Defer     Other Topics Concern   • Not on file           Objective   Physical Exam   Constitutional: She is oriented to person, place, and time. She appears well-developed and  well-nourished. No distress.   HENT:   Head: Normocephalic and atraumatic.   Right Ear: External ear normal.   Left Ear: External ear normal.   Nose: Nose normal.   Eyes: Conjunctivae and EOM are normal. Pupils are equal, round, and reactive to light.   Neck: Normal range of motion. Neck supple. No JVD present. No tracheal deviation present.   No meningeal signs.   Cardiovascular: Normal rate, regular rhythm and normal heart sounds.    No murmur heard.  Pulmonary/Chest: Effort normal and breath sounds normal. No respiratory distress. She has no wheezes.   Abdominal: Soft. Bowel sounds are normal. There is no tenderness.   Musculoskeletal: Normal range of motion. She exhibits no edema or deformity.   Neurological: She is alert and oriented to person, place, and time. No cranial nerve deficit.   Skin: Skin is warm and dry. No rash noted. She is not diaphoretic. No erythema. No pallor.   Psychiatric: She has a normal mood and affect. Her behavior is normal. Thought content normal.   Nursing note and vitals reviewed.      Procedures         ED Course  ED Course   Comment By Time   Long wait on radiology read. TAMI Penn 03/23 1110                  MDM  Number of Diagnoses or Management Options  Chronic nonintractable headache, unspecified headache type: established and worsening     Amount and/or Complexity of Data Reviewed  Clinical lab tests: reviewed and ordered  Tests in the radiology section of CPT®: reviewed and ordered  Discuss the patient with other providers: yes    Risk of Complications, Morbidity, and/or Mortality  Presenting problems: moderate        Final diagnoses:   Chronic nonintractable headache, unspecified headache type            TAMI Penn  03/23/18 1157

## 2018-03-23 NOTE — ED NOTES
Pt requests lunch and requests something else for her head pain. Pa made aware, will await new orders.     Pamela Wynne RN  03/23/18 1111

## 2018-03-24 ENCOUNTER — HOSPITAL ENCOUNTER (EMERGENCY)
Facility: HOSPITAL | Age: 43
Discharge: HOME | End: 2018-03-24
Attending: EMERGENCY MEDICINE | Admitting: EMERGENCY MEDICINE

## 2018-03-24 VITALS
HEIGHT: 69 IN | SYSTOLIC BLOOD PRESSURE: 121 MMHG | RESPIRATION RATE: 16 BRPM | HEART RATE: 89 BPM | WEIGHT: 153 LBS | TEMPERATURE: 98 F | OXYGEN SATURATION: 99 % | BODY MASS INDEX: 22.66 KG/M2 | DIASTOLIC BLOOD PRESSURE: 82 MMHG

## 2018-03-24 DIAGNOSIS — G44.89 OTHER HEADACHE SYNDROME: Primary | ICD-10-CM

## 2018-03-24 PROCEDURE — 25010000002 BUTORPHANOL PER 1 MG: Performed by: EMERGENCY MEDICINE

## 2018-03-24 PROCEDURE — 25010000002 DIHYDROERGOTAMINE MESYLATE PER 1 MG: Performed by: EMERGENCY MEDICINE

## 2018-03-24 PROCEDURE — 25010000002 HEPARIN FLUSH (PORCINE) 100 UNIT/ML SOLUTION: Performed by: EMERGENCY MEDICINE

## 2018-03-24 PROCEDURE — 25010000002 DEXAMETHASONE PER 1 MG: Performed by: EMERGENCY MEDICINE

## 2018-03-24 PROCEDURE — 96365 THER/PROPH/DIAG IV INF INIT: CPT

## 2018-03-24 PROCEDURE — 96375 TX/PRO/DX INJ NEW DRUG ADDON: CPT

## 2018-03-24 PROCEDURE — 25010000002 PROMETHAZINE PER 50 MG: Performed by: EMERGENCY MEDICINE

## 2018-03-24 PROCEDURE — 25010000002 DIPHENHYDRAMINE PER 50 MG: Performed by: EMERGENCY MEDICINE

## 2018-03-24 PROCEDURE — 99283 EMERGENCY DEPT VISIT LOW MDM: CPT

## 2018-03-24 RX ORDER — NALBUPHINE HCL 10 MG/ML
10 AMPUL (ML) INJECTION ONCE
Status: DISCONTINUED | OUTPATIENT
Start: 2018-03-24 | End: 2018-03-24

## 2018-03-24 RX ORDER — PROMETHAZINE HYDROCHLORIDE 25 MG/ML
12.5 INJECTION, SOLUTION INTRAMUSCULAR; INTRAVENOUS ONCE
Status: COMPLETED | OUTPATIENT
Start: 2018-03-24 | End: 2018-03-24

## 2018-03-24 RX ORDER — DIHYDROERGOTAMINE MESYLATE 1 MG/ML
0.5 INJECTION, SOLUTION INTRAMUSCULAR; INTRAVENOUS; SUBCUTANEOUS ONCE
Status: COMPLETED | OUTPATIENT
Start: 2018-03-24 | End: 2018-03-24

## 2018-03-24 RX ORDER — SODIUM CHLORIDE 0.9 % (FLUSH) 0.9 %
10 SYRINGE (ML) INJECTION AS NEEDED
Status: DISCONTINUED | OUTPATIENT
Start: 2018-03-24 | End: 2018-03-24 | Stop reason: HOSPADM

## 2018-03-24 RX ORDER — DIPHENHYDRAMINE HYDROCHLORIDE 50 MG/ML
25 INJECTION INTRAMUSCULAR; INTRAVENOUS ONCE
Status: COMPLETED | OUTPATIENT
Start: 2018-03-24 | End: 2018-03-24

## 2018-03-24 RX ADMIN — SODIUM CHLORIDE 50 ML: 9 INJECTION, SOLUTION INTRAVENOUS at 09:55

## 2018-03-24 RX ADMIN — DIPHENHYDRAMINE HYDROCHLORIDE 25 MG: 50 INJECTION INTRAMUSCULAR; INTRAVENOUS at 09:50

## 2018-03-24 RX ADMIN — PROMETHAZINE HYDROCHLORIDE 12.5 MG: 25 INJECTION INTRAMUSCULAR; INTRAVENOUS at 09:55

## 2018-03-24 RX ADMIN — DIHYDROERGOTAMINE MESYLATE 0.5 MG: 1 INJECTION, SOLUTION INTRAMUSCULAR; INTRAVENOUS; SUBCUTANEOUS at 10:22

## 2018-03-24 RX ADMIN — BUTORPHANOL TARTRATE 2 MG: 2 INJECTION, SOLUTION INTRAMUSCULAR; INTRAVENOUS at 09:52

## 2018-03-24 RX ADMIN — DEXAMETHASONE SODIUM PHOSPHATE 10 MG: 4 INJECTION, SOLUTION INTRAMUSCULAR; INTRAVENOUS at 09:34

## 2018-03-24 RX ADMIN — SODIUM CHLORIDE, PRESERVATIVE FREE 300 UNITS: 5 INJECTION INTRAVENOUS at 10:23

## 2018-03-24 NOTE — ED PROVIDER NOTES
"Subjective   43-year-old white female complains of headache.  She describes diffuse throbbing headache \"like being hit in head with a hammer\".  She has associated nausea, photophobia, pain in her abdomen and chest from vomiting and neck pain (history of cervical DJD).  Is worse when she sits up or stands up.  She was admitted to [facility] for DHE protocol and discharged 2 days ago, she was seen in the ER here yesterday for same symptoms again.  She states that when she left the [facility] should her headache was 4/10.  Yesterday her pain was improved after treatment here, but her headache has recurred.  She denied any numbness, weakness, tingling, mental status change, fever, chills or other complaints.            Review of Systems   All other systems reviewed and are negative.      Past Medical History:   Diagnosis Date   • Abdominal pain    • Abdominal swelling    • Hoyos's disease    • Bipolar 1 disorder    • Brain tumor     R Frontal Lobe per pt   • Brain tumor 2014   • Brain tumor (benign)    • Constipation    • DDD (degenerative disc disease), cervical 05/29/2017   • DDD (degenerative disc disease), cervical    • Diarrhea    • Fibromyalgia    • IBS (irritable bowel syndrome)    • Migraine    • Nausea & vomiting    • PONV (postoperative nausea and vomiting)    • PTSD (post-traumatic stress disorder)    • Rectal bleeding        Allergies   Allergen Reactions   • Ativan [Lorazepam] Hallucinations     confusion   • Sulfa Antibiotics Shortness Of Breath and Swelling   • Compazine [Prochlorperazine Edisylate] Hives   • Demerol [Meperidine] Hives   • Droperidol Itching   • Metoclopramide Swelling   • Toradol [Ketorolac Tromethamine] Hives and Itching   • Zofran [Ondansetron Hcl] Rash       Past Surgical History:   Procedure Laterality Date   • ANAL SCOPE N/A 7/28/2016    Procedure: ANAL SCOPE;  Surgeon: Kael Lopez MD;  Location: [facility];  Service:    • APPENDECTOMY     • COLONOSCOPY N/A 6/30/2016 "    Procedure: COLONOSCOPY  CPTCODE:78101;  Surgeon: Jose Antonio Belle III, MD;  Location: [facility] OR;  Service:    • COLONOSCOPY N/A 7/7/2016    Procedure: COLONOSCOPY (92136) CPT;  Surgeon: Jose Antonio Belle III, MD;  Location: [facility] OR;  Service:    • CYSTOSCOPY RETROGRADE PYELOGRAM Bilateral 4/28/2017    Procedure: CYSTOSCOPY RETROGRADE PYELOGRAM;  Surgeon: Mu Blunt MD;  Location: [facility] OR;  Service:    • ENDOSCOPY N/A 6/30/2016    Procedure: ESOPHAGOGASTRODUODENOSCOPY WITH BIOPSY  CPTCODE:22924;  Surgeon: Jose Antonio Belle III, MD;  Location: [facility] OR;  Service:    • HEMORRHOIDECTOMY N/A 7/28/2016    Procedure: HEMORRHOID STAPLING;  Surgeon: Kael Lopez MD;  Location: [facility] OR;  Service:    • HYSTERECTOMY     • KNEE SURGERY     • LAPAROSCOPIC SALPINGOOPHERECTOMY     • PORTACATH PLACEMENT N/A 7/28/2017    Procedure: INSERTION OF PORTACATH;  Surgeon: Celso Arredondo MD;  Location: [facility] OR;  Service:    • SHOULDER SURGERY      3 times       Family History   Problem Relation Age of Onset   • Crohn's disease Other    • Hypertension Other    • Diabetes Other    • Irritable bowel syndrome Other        Social History     Social History   • Marital status:      Social History Main Topics   • Smoking status: Former Smoker     Packs/day: 1.00     Years: 20.00     Quit date: 11/1/2015   • Smokeless tobacco: Never Used   • Alcohol use No   • Drug use:      Types: Marijuana      Comment: for pain   • Sexual activity: Defer     Other Topics Concern   • Not on file           Objective   Physical Exam   Constitutional: She is oriented to person, place, and time. She appears well-developed and well-nourished.   HENT:   Head: Normocephalic and atraumatic.   Mouth/Throat: Oropharynx is clear and moist.   Eyes: Conjunctivae and EOM are normal. Pupils are equal, round, and reactive to light.   Neck: Normal range of motion. Neck supple.   Cardiovascular: Normal rate, regular rhythm and normal  heart sounds.  Exam reveals no gallop and no friction rub.    No murmur heard.  Pulmonary/Chest: Effort normal and breath sounds normal. No respiratory distress. She has no wheezes. She has no rales.   Abdominal: Soft. Bowel sounds are normal. She exhibits no distension. There is no tenderness.   Musculoskeletal: Normal range of motion. She exhibits no edema.        Cervical back: She exhibits tenderness. She exhibits normal range of motion and no bony tenderness.   Cervical paraspinous muscle tenderness bilaterally.   Neurological: She is alert and oriented to person, place, and time.   Skin: Skin is warm and dry.   Psychiatric: She has a normal mood and affect.   Nursing note and vitals reviewed.      Procedures         ED Course  ED Course   Comment By Time   I reviewed her recent hospitalization and ER visits.  She has no focal neurologic findings at this time.  We'll give additional dose of DHE as well as pain medicine, Decadron, Benadryl.  Plan to discharge home. Moreno Ware MD 03/24 1009                  MDM  Number of Diagnoses or Management Options  Other headache syndrome:   Risk of Complications, Morbidity, and/or Mortality  Presenting problems: high  Diagnostic procedures: minimal  Management options: moderate        Final diagnoses:   Other headache syndrome            Moreno Ware MD  03/24/18 1516

## 2018-03-24 NOTE — ED NOTES
Pt alert and oriented, skin pwd, no respiratory distress. Perrl. Pt reports nausea and neck pain are gone, reports headache is still present but improving.      Pamela Wynne RN  03/24/18 [id number]

## 2018-04-03 ENCOUNTER — OFFICE VISIT (OUTPATIENT)
Dept: UROLOGY | Facility: CLINIC | Age: 43
End: 2018-04-03

## 2018-04-03 VITALS — WEIGHT: 153 LBS | HEIGHT: 69 IN | BODY MASS INDEX: 22.66 KG/M2

## 2018-04-03 DIAGNOSIS — F52.0 HYPOACTIVE SEXUAL DESIRE DISORDER: ICD-10-CM

## 2018-04-03 DIAGNOSIS — IMO0002 URETHRAL STENOSIS: ICD-10-CM

## 2018-04-03 DIAGNOSIS — R68.82 REDUCED LIBIDO: Primary | ICD-10-CM

## 2018-04-03 PROCEDURE — 53661 DILATION OF URETHRA: CPT | Performed by: UROLOGY

## 2018-04-03 PROCEDURE — 96372 THER/PROPH/DIAG INJ SC/IM: CPT | Performed by: UROLOGY

## 2018-04-03 PROCEDURE — 99213 OFFICE O/P EST LOW 20 MIN: CPT | Performed by: UROLOGY

## 2018-04-03 RX ORDER — GENTAMICIN SULFATE 40 MG/ML
80 INJECTION, SOLUTION INTRAMUSCULAR; INTRAVENOUS ONCE
Status: COMPLETED | OUTPATIENT
Start: 2018-04-03 | End: 2018-04-03

## 2018-04-03 RX ORDER — TESTOSTERONE CYPIONATE 200 MG/ML
100 INJECTION, SOLUTION INTRAMUSCULAR ONCE
Status: DISCONTINUED | OUTPATIENT
Start: 2018-04-03 | End: 2018-06-09 | Stop reason: HOSPADM

## 2018-04-03 RX ORDER — OXYCODONE AND ACETAMINOPHEN 10; 325 MG/1; MG/1
1 TABLET ORAL EVERY 6 HOURS PRN
Qty: 12 TABLET | Refills: 0 | Status: SHIPPED | OUTPATIENT
Start: 2018-04-03 | End: 2018-05-15 | Stop reason: SDUPTHER

## 2018-04-03 RX ADMIN — GENTAMICIN SULFATE 80 MG: 40 INJECTION, SOLUTION INTRAMUSCULAR; INTRAVENOUS at 13:15

## 2018-04-03 NOTE — PROGRESS NOTES
Chief Complaint:          Chief Complaint   Patient presents with   • URETHRAL STENOSIS     DILATATION       HPI:   43 y.o. female.  43 year old white female. Accompanied by  with IgA nephropathy, hepatitis C, severe urethral stenosis who presents for urethral dilation.  She is desirous of more pain medication but I explained to her carefully as long as she is on hydrocodone and I will not give her any more pain medication.  And if she's not on it she can only have several pills around the time of her dilation.  I also explained to her there is no permanent fix of the situation other than self dilatation which she refuses to do.Today she wants to learn to do intermittent catheter.  She gets pain medication from her primary care provider and she will be given pain medication only for short supply after painful dilatation this is been discussed with her primary care physician he will have dilatation no more than every 6 weeks in this office.  She returns today for follow-up.  She is now doing dramatically better.  She is now catheterizing herself intermittently.  It's helped dramatically I went ahead after prep and drape in a sterile fashion and dilated her sequentially to [address] [place] which was the max she can take.  There were no complications.  Overall, she's doing well, she is not gotten into see the nephrologist as yet for inexplicable reasons.  I reaffirmed the importance of a workup of her IgA nephropathy.  She is here for follow-up she wants to be dilated.  I went ahead and recommended this.  She was dilated without complication to [address].  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  He also complains of significant loss of libido and requests a  testosterone injection.  And a long discussion of the complications including clitorimegaly, hair growth, nipple hair growth etc. and she wishes to proceed.    Past Medical History:        Past Medical History:   Diagnosis Date   • Abdominal pain    • Abdominal swelling    • Hoyos's disease    • Bipolar 1 disorder    • Brain tumor     R Frontal Lobe per pt   • Brain tumor 2014   • Brain tumor (benign)    • Constipation    • DDD (degenerative disc disease), cervical 05/29/2017   • DDD (degenerative disc disease), cervical    • Diarrhea    • Fibromyalgia    • IBS (irritable bowel syndrome)    • Migraine    • Nausea & vomiting    • PONV (postoperative nausea and vomiting)    • PTSD (post-traumatic stress disorder)    • Rectal bleeding          Current Meds:     Current Outpatient Prescriptions   Medication Sig Dispense Refill   • busPIRone (BUSPAR) 15 MG tablet Take 15 mg by mouth 3 (three) times a day        • butorphanol (STADOL) 10 MG/ML nasal spray 1 spray into each nostril Daily.     • cetirizine (zyrTEC) 10 MG tablet Take 1 tablet by mouth Daily. 30 tablet 0   • divalproex (DEPAKOTE) 500 MG DR tablet Take 250 mg by mouth 3 (Three) Times a Day.     • pantoprazole (PROTONIX) 40 MG EC tablet Take 40 mg by mouth Daily As Needed.     • promethazine (PHENERGAN) 25 MG tablet Take 1 tablet by mouth Every 6 (Six) Hours As Needed for Nausea or Vomiting. 30 tablet 0   • sertraline (ZOLOFT) 100 MG tablet Take 100 mg by mouth 2 (Two) Times a Day.     • SUMAtriptan (IMITREX) 6 MG/0.5ML solution injection Inject 6 mg under the skin 1 (One) Time.       No current facility-administered medications for this visit.         Allergies:      Allergies   Allergen Reactions   • Ativan [Lorazepam] Hallucinations     confusion   • Sulfa Antibiotics Shortness Of Breath and Swelling   • Compazine [Prochlorperazine Edisylate] Hives   • Demerol [Meperidine] Hives   • Droperidol Itching   • Metoclopramide Swelling   • Toradol [Ketorolac  Tromethamine] Hives and Itching   • Zofran [Ondansetron Hcl] Rash        Past Surgical History:     Past Surgical History:   Procedure Laterality Date   • ANAL SCOPE N/A 7/28/2016    Procedure: ANAL SCOPE;  Surgeon: Kael Lopez MD;  Location: [facility] OR;  Service:    • APPENDECTOMY     • COLONOSCOPY N/A 6/30/2016    Procedure: COLONOSCOPY  CPTCODE:33092;  Surgeon: Jose Antonio Belle III, MD;  Location: [facility] OR;  Service:    • COLONOSCOPY N/A 7/7/2016    Procedure: COLONOSCOPY (77973) CPT;  Surgeon: Jose Antonio Belle III, MD;  Location: [facility] OR;  Service:    • CYSTOSCOPY RETROGRADE PYELOGRAM Bilateral 4/28/2017    Procedure: CYSTOSCOPY RETROGRADE PYELOGRAM;  Surgeon: Mu Blunt MD;  Location: [facility] OR;  Service:    • ENDOSCOPY N/A 6/30/2016    Procedure: ESOPHAGOGASTRODUODENOSCOPY WITH BIOPSY  CPTCODE:62577;  Surgeon: Jose Antonio Belle III, MD;  Location: [facility] OR;  Service:    • HEMORRHOIDECTOMY N/A 7/28/2016    Procedure: HEMORRHOID STAPLING;  Surgeon: Kael Lopez MD;  Location: [facility] OR;  Service:    • HYSTERECTOMY     • KNEE SURGERY     • LAPAROSCOPIC SALPINGOOPHERECTOMY     • PORTACATH PLACEMENT N/A 7/28/2017    Procedure: INSERTION OF PORTACATH;  Surgeon: Celso Arredondo MD;  Location: [facility] OR;  Service:    • SHOULDER SURGERY      3 times         Social History:     Social History     Social History   • Marital status:      Spouse name: N/A   • Number of children: N/A   • Years of education: N/A     Occupational History   • Not on file.     Social History Main Topics   • Smoking status: Former Smoker     Packs/day: 1.00     Years: 20.00     Quit date: 11/1/2015   • Smokeless tobacco: Never Used   • Alcohol use No   • Drug use:      Types: Marijuana      Comment: for pain   • Sexual activity: Defer     Other Topics Concern   • Not on file     Social History Narrative   • No narrative on file       Family History:     Family History   Problem Relation Age of Onset   •  Crohn's disease Other    • Hypertension Other    • Diabetes Other    • Irritable bowel syndrome Other        Review of Systems:     Review of Systems   Constitutional: Negative.  Negative for activity change, appetite change, chills, diaphoresis, fatigue and unexpected weight change.   HENT: Negative for congestion, dental problem, drooling, ear discharge, ear pain, facial swelling, hearing loss, mouth sores, nosebleeds, postnasal drip, rhinorrhea, sinus pressure, sneezing, sore throat, tinnitus, trouble swallowing and voice change.    Eyes: Negative.  Negative for photophobia, pain, discharge, redness, itching and visual disturbance.   Respiratory: Negative.  Negative for apnea, cough, choking, chest tightness, shortness of breath, wheezing and stridor.    Cardiovascular: Negative.  Negative for chest pain, palpitations and leg swelling.   Gastrointestinal: Negative.  Negative for abdominal distention, abdominal pain, anal bleeding, blood in stool, constipation, diarrhea, nausea, rectal pain and vomiting.   Endocrine: Negative.  Negative for cold intolerance, heat intolerance, polydipsia, polyphagia and polyuria.   Genitourinary: Positive for difficulty urinating and dyspareunia.   Musculoskeletal: Negative.  Negative for arthralgias, back pain, gait problem, joint swelling, myalgias, neck pain and neck stiffness.   Skin: Negative.  Negative for color change, pallor, rash and wound.   Allergic/Immunologic: Negative.  Negative for environmental allergies, food allergies and immunocompromised state.   Neurological: Negative.  Negative for dizziness, tremors, seizures, syncope, facial asymmetry, speech difficulty, weakness, light-headedness, numbness and headaches.   Hematological: Negative.  Negative for adenopathy. Does not bruise/bleed easily.   Psychiatric/Behavioral: Negative for agitation, behavioral problems, confusion, decreased concentration, dysphoric mood, hallucinations, self-injury, sleep disturbance and  suicidal ideas. The patient is not nervous/anxious and is not hyperactive.    All other systems reviewed and are negative.      Physical Exam:     Physical Exam   Constitutional: She appears well-developed and well-nourished.   HENT:   Head: Normocephalic and atraumatic.   Right Ear: External ear normal.   Left Ear: External ear normal.   Mouth/Throat: Oropharynx is clear and moist.   Eyes: Conjunctivae are normal. Pupils are equal, round, and reactive to light.   Cardiovascular: Normal rate, regular rhythm, normal heart sounds and intact distal pulses.    Pulmonary/Chest: Effort normal and breath sounds normal.   Abdominal: Soft. Bowel sounds are normal. She exhibits no distension and no mass. There is no tenderness. There is no rebound and no guarding.   Genitourinary: Vagina normal. No vaginal discharge found.   Musculoskeletal: Normal range of motion.   Neurological: She is alert. She has normal reflexes.   Skin: Skin is warm and dry.   Psychiatric: She has a normal mood and affect. Her behavior is normal. Judgment and thought content normal.       I have reviewed the following portions of the patient's history: allergies, current medications, past family history, past medical history, past social history, past surgical history, problem list and ROS and confirm it's accurate.      Procedure:     Urethral dilation-after an appropriate informed consent the patient was brought to the procedure suite.  The urethra was gently anesthetized with 10 cc of 2% viscous Xylocaine jelly.  After an adequate period of topical anesthesia and dilated with [place] sounds from [address] [place] sequentially without complication the patient was given gentamicin as prophylaxis with 80 mg  Assessment/Plan:   Urethral stenosis- s/ post dilation   Hypoactive sexual desire disorder- testosterone treatment today  Narcotic pain medication-patient has significant acute pain that I believe would be an indication for the use of narcotic pain  medication.  I discussed the significant risks of pain medication and the fact that this will be a short only option and I will give her no more than a three-day supply of pain medication.  And I will not plan long-term medication and that this will be sent to a pain clinic if at all becomes necessary.  We discussed signing a pain medication agreement and the fact that we're going to run a state [place] review to be sure the patient is not getting pain medication from elsewhere.  If this is the case we will not give pain medication.  As part of the patient's treatment plan of there being prescribed a controlled substance with potential for abuse.  This patient has been wait aware of the appropriate dose of such medications including, the risk for somnolence, limited ability to drive and/or safety and the significant potential for overdose.  It is been made clear that these medications are for the prescribed patient only without concomitant use of alcohol or other sepsis unless prescribed by the medical provider.  Has completed prescribing agreement detailing the terms of continue prescribing him a controlled substance.  Including monitoring Luis Alberto ports, the possibility of urine drug screens and pedal counts.  The patient is aware that we reviewed LUIS ALBERTO reports on a regular basis and scan them into the chart.  History and physical examination exhibited continued safe and appropriate use of controlled substances.  Also discussed the fact that the new [place] legislation allows only a three-day prescription for pain medication.  In this situation he will be referred to a chronic pain clinic.  Given 12 pain pills after the procedure which is apparently been okayed by her pain clinic.    Discussed the patient's BMI with her. BMI is above normal parameters. Follow-up plan includes:  educational material.          This document has been electronically signed by VERENICE FINK MD April 3, 2018 1:14 PM

## 2018-04-04 PROBLEM — F52.0 HYPOACTIVE SEXUAL DESIRE DISORDER: Status: ACTIVE | Noted: 2018-04-04

## 2018-04-12 ENCOUNTER — APPOINTMENT (OUTPATIENT)
Dept: GENERAL RADIOLOGY | Facility: HOSPITAL | Age: 43
End: 2018-04-12

## 2018-04-12 ENCOUNTER — HOSPITAL ENCOUNTER (EMERGENCY)
Facility: HOSPITAL | Age: 43
Discharge: HOME | End: 2018-04-12
Attending: EMERGENCY MEDICINE | Admitting: EMERGENCY MEDICINE

## 2018-04-12 VITALS
DIASTOLIC BLOOD PRESSURE: 83 MMHG | TEMPERATURE: 97.9 F | HEART RATE: 79 BPM | SYSTOLIC BLOOD PRESSURE: 110 MMHG | OXYGEN SATURATION: 100 % | RESPIRATION RATE: 18 BRPM | BODY MASS INDEX: 24.14 KG/M2 | HEIGHT: 69 IN | WEIGHT: 163 LBS

## 2018-04-12 DIAGNOSIS — G43.009 MIGRAINE WITHOUT AURA AND WITHOUT STATUS MIGRAINOSUS, NOT INTRACTABLE: Primary | ICD-10-CM

## 2018-04-12 LAB
FLUAV AG NPH QL: NEGATIVE
FLUBV AG NPH QL IA: NEGATIVE
S PYO AG THROAT QL: NEGATIVE

## 2018-04-12 PROCEDURE — 87880 STREP A ASSAY W/OPTIC: CPT | Performed by: EMERGENCY MEDICINE

## 2018-04-12 PROCEDURE — 99284 EMERGENCY DEPT VISIT MOD MDM: CPT

## 2018-04-12 PROCEDURE — 96374 THER/PROPH/DIAG INJ IV PUSH: CPT

## 2018-04-12 PROCEDURE — 25010000002 DIPHENHYDRAMINE PER 50 MG: Performed by: EMERGENCY MEDICINE

## 2018-04-12 PROCEDURE — 71046 X-RAY EXAM CHEST 2 VIEWS: CPT | Performed by: RADIOLOGY

## 2018-04-12 PROCEDURE — 25010000002 PROMETHAZINE PER 50 MG: Performed by: EMERGENCY MEDICINE

## 2018-04-12 PROCEDURE — 87804 INFLUENZA ASSAY W/OPTIC: CPT | Performed by: EMERGENCY MEDICINE

## 2018-04-12 PROCEDURE — 87081 CULTURE SCREEN ONLY: CPT | Performed by: EMERGENCY MEDICINE

## 2018-04-12 PROCEDURE — 71046 X-RAY EXAM CHEST 2 VIEWS: CPT

## 2018-04-12 PROCEDURE — 96361 HYDRATE IV INFUSION ADD-ON: CPT

## 2018-04-12 PROCEDURE — 25010000002 HEPARIN FLUSH (PORCINE) 100 UNIT/ML SOLUTION

## 2018-04-12 PROCEDURE — 96375 TX/PRO/DX INJ NEW DRUG ADDON: CPT

## 2018-04-12 RX ORDER — DIPHENHYDRAMINE HYDROCHLORIDE 50 MG/ML
50 INJECTION INTRAMUSCULAR; INTRAVENOUS ONCE
Status: COMPLETED | OUTPATIENT
Start: 2018-04-12 | End: 2018-04-12

## 2018-04-12 RX ORDER — PROMETHAZINE HYDROCHLORIDE 25 MG/ML
12.5 INJECTION, SOLUTION INTRAMUSCULAR; INTRAVENOUS ONCE
Status: COMPLETED | OUTPATIENT
Start: 2018-04-12 | End: 2018-04-12

## 2018-04-12 RX ORDER — SODIUM CHLORIDE 9 MG/ML
125 INJECTION, SOLUTION INTRAVENOUS CONTINUOUS
Status: DISCONTINUED | OUTPATIENT
Start: 2018-04-12 | End: 2018-04-12 | Stop reason: HOSPADM

## 2018-04-12 RX ORDER — SODIUM CHLORIDE 0.9 % (FLUSH) 0.9 %
10 SYRINGE (ML) INJECTION AS NEEDED
Status: DISCONTINUED | OUTPATIENT
Start: 2018-04-12 | End: 2018-04-12 | Stop reason: HOSPADM

## 2018-04-12 RX ORDER — KETOROLAC TROMETHAMINE 30 MG/ML
30 INJECTION, SOLUTION INTRAMUSCULAR; INTRAVENOUS ONCE
Status: DISCONTINUED | OUTPATIENT
Start: 2018-04-12 | End: 2018-04-12 | Stop reason: HOSPADM

## 2018-04-12 RX ADMIN — PROMETHAZINE HYDROCHLORIDE 12.5 MG: 25 INJECTION INTRAMUSCULAR; INTRAVENOUS at 07:14

## 2018-04-12 RX ADMIN — DIPHENHYDRAMINE HYDROCHLORIDE 50 MG: 50 INJECTION INTRAMUSCULAR; INTRAVENOUS at 07:15

## 2018-04-12 RX ADMIN — HEPARIN SODIUM (PORCINE) LOCK FLUSH IV SOLN 100 UNIT/ML 500 UNITS: 100 SOLUTION at 08:25

## 2018-04-12 RX ADMIN — SODIUM CHLORIDE 125 ML/HR: 9 INJECTION, SOLUTION INTRAVENOUS at 07:16

## 2018-04-12 NOTE — ED PROVIDER NOTES
Subjective   Pt comes in with C/O headache and N/V.  Has had migraine for 2 days.  Has recently had URI, treated by urgent care.          History provided by:  Patient, spouse and medical records  Migraine   Pain location:  Generalized  Quality:  Dull  Radiates to:  Does not radiate  Severity currently:  10/10  Severity at highest:  10/10  Onset quality:  Gradual  Duration:  2 days  Timing:  Constant  Progression:  Worsening  Chronicity:  Recurrent  Similar to prior headaches: yes    Context: coughing    Context: not activity, not exposure to bright light, not caffeine, not defecating, not eating, not stress, not exposure to cold air, not intercourse, not loud noise and not straining    Relieved by:  Nothing  Worsened by:  Nothing  Ineffective treatments:  Prescription medications  Associated symptoms: abdominal pain, congestion, cough, drainage, ear pain, myalgias, nausea, sore throat, swollen glands, URI and vomiting    Associated symptoms: no back pain, no blurred vision, no diarrhea, no dizziness, no eye pain, no facial pain, no fatigue, no fever, no focal weakness, no hearing loss, no loss of balance, no near-syncope, no neck pain, no neck stiffness, no numbness, no paresthesias, no photophobia, no seizures, no sinus pressure, no syncope, no tingling, no visual change and no weakness        Review of Systems   Constitutional: Negative.  Negative for fatigue and fever.   HENT: Positive for congestion, ear pain, postnasal drip and sore throat. Negative for hearing loss, nosebleeds, sinus pressure, trouble swallowing and voice change.    Eyes: Negative.  Negative for blurred vision, photophobia and pain.   Respiratory: Positive for cough.    Cardiovascular: Positive for chest pain. Negative for syncope and near-syncope.   Gastrointestinal: Positive for abdominal pain, nausea and vomiting. Negative for diarrhea.   Endocrine: Negative.    Genitourinary: Negative.    Musculoskeletal: Positive for myalgias. Negative  for back pain, neck pain and neck stiffness.   Skin: Negative.    Allergic/Immunologic: Negative.    Neurological: Negative.  Negative for dizziness, focal weakness, seizures, weakness, numbness, paresthesias and loss of balance.   Hematological: Negative.    Psychiatric/Behavioral: Negative.    All other systems reviewed and are negative.      Past Medical History:   Diagnosis Date   • Abdominal pain    • Abdominal swelling    • Hoyos's disease    • Bipolar 1 disorder    • Brain tumor     R Frontal Lobe per pt   • Brain tumor 2014   • Brain tumor (benign)    • Constipation    • DDD (degenerative disc disease), cervical 05/29/2017   • DDD (degenerative disc disease), cervical    • Diarrhea    • Fibromyalgia    • IBS (irritable bowel syndrome)    • Migraine    • Nausea & vomiting    • PONV (postoperative nausea and vomiting)    • PTSD (post-traumatic stress disorder)    • Rectal bleeding        Allergies   Allergen Reactions   • Ativan [Lorazepam] Hallucinations     confusion   • Sulfa Antibiotics Shortness Of Breath and Swelling   • Compazine [Prochlorperazine Edisylate] Hives   • Demerol [Meperidine] Hives   • Droperidol Itching   • Metoclopramide Swelling   • Toradol [Ketorolac Tromethamine] Hives and Itching   • Zofran [Ondansetron Hcl] Rash       Past Surgical History:   Procedure Laterality Date   • ANAL SCOPE N/A 7/28/2016    Procedure: ANAL SCOPE;  Surgeon: Kale Lopez MD;  Location: [facility];  Service:    • APPENDECTOMY     • COLONOSCOPY N/A 6/30/2016    Procedure: COLONOSCOPY  CPTCODE:44997;  Surgeon: Jose Antonio Belle III, MD;  Location: [facility] OR;  Service:    • COLONOSCOPY N/A 7/7/2016    Procedure: COLONOSCOPY (27216) CPT;  Surgeon: Jose Antonio Belle III, MD;  Location: [facility] OR;  Service:    • CYSTOSCOPY RETROGRADE PYELOGRAM Bilateral 4/28/2017    Procedure: CYSTOSCOPY RETROGRADE PYELOGRAM;  Surgeon: Mu Bulnt MD;  Location: [facility] OR;  Service:    • ENDOSCOPY N/A 6/30/2016     Procedure: ESOPHAGOGASTRODUODENOSCOPY WITH BIOPSY  CPTCODE:63864;  Surgeon: Jose Antonio Belle III, MD;  Location: [facility] OR;  Service:    • HEMORRHOIDECTOMY N/A 7/28/2016    Procedure: HEMORRHOID STAPLING;  Surgeon: Kael Lopez MD;  Location: [facility] OR;  Service:    • HYSTERECTOMY     • KNEE SURGERY     • LAPAROSCOPIC SALPINGOOPHERECTOMY     • PORTACATH PLACEMENT N/A 7/28/2017    Procedure: INSERTION OF PORTACATH;  Surgeon: Celso Arredondo MD;  Location: [facility] OR;  Service:    • SHOULDER SURGERY      3 times       Family History   Problem Relation Age of Onset   • Crohn's disease Other    • Hypertension Other    • Diabetes Other    • Irritable bowel syndrome Other        Social History     Social History   • Marital status:      Social History Main Topics   • Smoking status: Former Smoker     Packs/day: 1.00     Years: 20.00     Quit date: 11/1/2015   • Smokeless tobacco: Never Used   • Alcohol use No   • Drug use:      Types: Marijuana      Comment: for pain   • Sexual activity: Defer     Other Topics Concern   • Not on file           Objective   Physical Exam   Constitutional: She is oriented to person, place, and time. She appears well-developed and well-nourished. No distress.   HENT:   Head: Normocephalic and atraumatic.   Right Ear: External ear normal.   Left Ear: External ear normal.   Nasal congestion  Pharyngeal erythema, no exudate no edema   Eyes: Conjunctivae and EOM are normal. Right eye exhibits no discharge. Left eye exhibits no discharge.   Neck: Normal range of motion. No tracheal deviation present.   Cardiovascular: Normal rate, regular rhythm, normal heart sounds and intact distal pulses.  Exam reveals no gallop and no friction rub.    No murmur heard.  Pulmonary/Chest: Effort normal and breath sounds normal. No stridor. No respiratory distress. She has no wheezes. She has no rales.   Dry cough   Abdominal: Soft. She exhibits no distension. There is no tenderness. There is no  guarding.   Dry heaves   Genitourinary:   Genitourinary Comments: No CVAT   Musculoskeletal: Normal range of motion. She exhibits no deformity.   Neurological: She is alert and oriented to person, place, and time. She exhibits normal muscle tone. Coordination normal.   Skin: Skin is warm and dry. No pallor.   Psychiatric: She has a normal mood and affect. Her behavior is normal. Judgment and thought content normal.   Nursing note and vitals reviewed.      Procedures         ED Course  ED Course     XR Chest 2 View   Final Result   Stable radiographic appearance of the chest.       This report was finalized on 4/12/2018 8:09 AM by Dr. Rigoberto Huddleston MD.            Labs Reviewed   INFLUENZA ANTIGEN, RAPID - Normal   RAPID STREP A SCREEN - Normal   BETA HEMOLYTIC STREP CULTURE, THROAT        Medication List      CONTINUE taking these medications    busPIRone 15 MG tablet  Commonly known as:  BUSPAR     butorphanol 10 MG/ML nasal spray  Commonly known as:  STADOL     cetirizine 10 MG tablet  Commonly known as:  zyrTEC  Take 1 tablet by mouth Daily.     divalproex 500 MG DR tablet  Commonly known as:  DEPAKOTE     IMITREX 6 MG/0.5ML solution injection  Generic drug:  SUMAtriptan     oxyCODONE-acetaminophen  MG per tablet  Commonly known as:  PERCOCET  Take 1 tablet by mouth Every 6 (Six) Hours As Needed for Moderate Pain .     pantoprazole 40 MG EC tablet  Commonly known as:  PROTONIX     promethazine 25 MG tablet  Commonly known as:  PHENERGAN  Take 1 tablet by mouth Every 6 (Six) Hours As Needed for Nausea or   Vomiting.     sertraline 100 MG tablet  Commonly known as:  ZOLOFT                MDM  Number of Diagnoses or Management Options  Migraine without aura and without status migrainosus, not intractable: established and worsening  Risk of Complications, Morbidity, and/or Mortality  Presenting problems: moderate  Diagnostic procedures: moderate  Management options: moderate    Patient Progress  Patient  progress: stable      Final diagnoses:   Migraine without aura and without status migrainosus, not intractable            Sathish Xavier MD  04/12/18 3668

## 2018-04-12 NOTE — ED NOTES
Hand hygiene performed, deaccessed pt port-a-cath, dressed with 2x2 guaze and secured with tape, no drainage or bleeding noted, pt tolerated well, no acute distress noted.      Shannen Lazo RN  04/12/18 0951       Shannen Lazo RN  04/12/18 1054       Shannen Lazo RN  04/12/18 4378

## 2018-04-12 NOTE — ED NOTES
Discharge instructions provided, follow-up appointment discussed, instructed pt to return to ED with any worsening of symptoms or if any new problems arise. Pt and family verbalized understanding of instructions. Pt ambulatory out of ED with spouse at side. No acute distress noted.      Shannen Lazo RN  04/12/18 6239

## 2018-04-14 LAB — BACTERIA SPEC AEROBE CULT: NORMAL

## 2018-04-16 ENCOUNTER — HOSPITAL ENCOUNTER (EMERGENCY)
Facility: HOSPITAL | Age: 43
Discharge: HOME | End: 2018-04-16
Attending: EMERGENCY MEDICINE | Admitting: EMERGENCY MEDICINE

## 2018-04-16 VITALS
RESPIRATION RATE: 16 BRPM | HEIGHT: 69 IN | BODY MASS INDEX: 25.03 KG/M2 | SYSTOLIC BLOOD PRESSURE: 116 MMHG | WEIGHT: 169 LBS | HEART RATE: 94 BPM | OXYGEN SATURATION: 97 % | DIASTOLIC BLOOD PRESSURE: 64 MMHG | TEMPERATURE: 101.1 F

## 2018-04-16 DIAGNOSIS — J10.1 INFLUENZA B: Primary | ICD-10-CM

## 2018-04-16 LAB
FLUAV AG NPH QL: NEGATIVE
FLUBV AG NPH QL IA: POSITIVE
S PYO AG THROAT QL: NEGATIVE

## 2018-04-16 PROCEDURE — 99283 EMERGENCY DEPT VISIT LOW MDM: CPT

## 2018-04-16 PROCEDURE — 87804 INFLUENZA ASSAY W/OPTIC: CPT | Performed by: EMERGENCY MEDICINE

## 2018-04-16 PROCEDURE — 87880 STREP A ASSAY W/OPTIC: CPT | Performed by: EMERGENCY MEDICINE

## 2018-04-16 PROCEDURE — 87081 CULTURE SCREEN ONLY: CPT | Performed by: EMERGENCY MEDICINE

## 2018-04-16 RX ORDER — ACETAMINOPHEN 500 MG
1000 TABLET ORAL ONCE
Status: COMPLETED | OUTPATIENT
Start: 2018-04-16 | End: 2018-04-16

## 2018-04-16 RX ADMIN — ACETAMINOPHEN 1000 MG: 500 TABLET ORAL at 03:49

## 2018-04-16 NOTE — ED PROVIDER NOTES
Subjective   Patient presents with viral symptoms. Has fever, chills, rhinorrhea, sore throat, cough, myalgias x 5 days.  Taking tylenol and motrin. Symptoms still present. No hx of sick contacts.         History provided by:  Patient   used: No    Flu Symptoms   Presenting symptoms: cough, fever, myalgias, rhinorrhea and sore throat    Presenting symptoms: no diarrhea, no nausea, no shortness of breath and no vomiting    Severity:  Mild  Duration:  5 days  Progression:  Unchanged  Chronicity:  New  Relieved by:  Nothing  Worsened by:  Nothing  Ineffective treatments:  OTC medications  Associated symptoms: chills    Associated symptoms: no congestion        Review of Systems   Constitutional: Positive for chills and fever.   HENT: Positive for rhinorrhea and sore throat. Negative for congestion and trouble swallowing.    Eyes: Negative for discharge and visual disturbance.   Respiratory: Positive for cough. Negative for chest tightness, shortness of breath and wheezing.    Cardiovascular: Negative for chest pain, palpitations and leg swelling.   Gastrointestinal: Negative for abdominal pain, constipation, diarrhea, nausea and vomiting.   Genitourinary: Negative for dysuria, flank pain and hematuria.   Musculoskeletal: Positive for myalgias. Negative for back pain and neck pain.   Skin: Negative for color change and rash.   Neurological: Negative for dizziness, weakness and numbness.   Psychiatric/Behavioral: Negative for self-injury and suicidal ideas.       Past Medical History:   Diagnosis Date   • Abdominal pain    • Abdominal swelling    • Hoyos's disease    • Bipolar 1 disorder    • Brain tumor     R Frontal Lobe per pt   • Brain tumor 2014   • Brain tumor (benign)    • Constipation    • DDD (degenerative disc disease), cervical 05/29/2017   • DDD (degenerative disc disease), cervical    • Diarrhea    • Fibromyalgia    • IBS (irritable bowel syndrome)    • Migraine    • Nausea & vomiting     • PONV (postoperative nausea and vomiting)    • PTSD (post-traumatic stress disorder)    • Rectal bleeding        Allergies   Allergen Reactions   • Ativan [Lorazepam] Hallucinations     confusion   • Sulfa Antibiotics Shortness Of Breath and Swelling   • Compazine [Prochlorperazine Edisylate] Hives   • Demerol [Meperidine] Hives   • Droperidol Itching   • Metoclopramide Swelling   • Toradol [Ketorolac Tromethamine] Hives and Itching   • Zofran [Ondansetron Hcl] Rash       Past Surgical History:   Procedure Laterality Date   • ANAL SCOPE N/A 7/28/2016    Procedure: ANAL SCOPE;  Surgeon: Kael Lopez MD;  Location: [facility] OR;  Service:    • APPENDECTOMY     • COLONOSCOPY N/A 6/30/2016    Procedure: COLONOSCOPY  CPTCODE:72240;  Surgeon: Jose Antonio Belle III, MD;  Location: [facility] OR;  Service:    • COLONOSCOPY N/A 7/7/2016    Procedure: COLONOSCOPY (39197) CPT;  Surgeon: Jose Antonio Belle III, MD;  Location: [facility] OR;  Service:    • CYSTOSCOPY RETROGRADE PYELOGRAM Bilateral 4/28/2017    Procedure: CYSTOSCOPY RETROGRADE PYELOGRAM;  Surgeon: Mu Blunt MD;  Location: [facility] OR;  Service:    • ENDOSCOPY N/A 6/30/2016    Procedure: ESOPHAGOGASTRODUODENOSCOPY WITH BIOPSY  CPTCODE:[id number];  Surgeon: Jose Antonio Belle III, MD;  Location: [facility] OR;  Service:    • HEMORRHOIDECTOMY N/A 7/28/2016    Procedure: HEMORRHOID STAPLING;  Surgeon: Kael Lopez MD;  Location: [facility] OR;  Service:    • HYSTERECTOMY     • KNEE SURGERY     • LAPAROSCOPIC SALPINGOOPHERECTOMY     • PORTACATH PLACEMENT N/A 7/28/2017    Procedure: INSERTION OF PORTACATH;  Surgeon: Celso Arredondo MD;  Location: [facility] OR;  Service:    • SHOULDER SURGERY      3 times       Family History   Problem Relation Age of Onset   • Crohn's disease Other    • Hypertension Other    • Diabetes Other    • Irritable bowel syndrome Other        Social History     Social History   • Marital status:      Social History Main Topics   •  Smoking status: Former Smoker     Packs/day: 1.00     Years: 20.00     Quit date: 11/1/2015   • Smokeless tobacco: Never Used   • Alcohol use No   • Drug use:      Types: Marijuana      Comment: for pain   • Sexual activity: Defer     Other Topics Concern   • Not on file           Objective   Physical Exam   Constitutional: She is oriented to person, place, and time. She appears well-developed and well-nourished.   HENT:   Head: Normocephalic and atraumatic.   Nose: Nose normal.   Mouth/Throat: Oropharynx is clear and moist.   Eyes: Conjunctivae and EOM are normal. Pupils are equal, round, and reactive to light.   Neck: Normal range of motion. Neck supple.   Cardiovascular: Normal rate, regular rhythm, normal heart sounds and intact distal pulses.    Pulmonary/Chest: Effort normal and breath sounds normal. No respiratory distress. She has no wheezes. She exhibits no tenderness.   Abdominal: Soft. Bowel sounds are normal. There is no tenderness. There is no rebound and no guarding.   Musculoskeletal: Normal range of motion. She exhibits no edema, tenderness or deformity.   Neurological: She is alert and oriented to person, place, and time. No cranial nerve deficit. Coordination normal.   Skin: Skin is warm and dry. No rash noted. No erythema. No pallor.   Psychiatric: She has a normal mood and affect. Her behavior is normal. Judgment and thought content normal.   Nursing note and vitals reviewed.      Procedures         ED Course  ED Course                  MDM  Number of Diagnoses or Management Options  Influenza B:   Diagnosis management comments: Flu positive. Out of tamiflu window.  Patient instructed to take medication over-the-counter as directed, to follow up with her primary care physician this week, and to return to emergency department for worsening symptoms.       Amount and/or Complexity of Data Reviewed  Clinical lab tests: reviewed        Final diagnoses:   Influenza B            Orlando Arriaza  MD  04/16/18 0416

## 2018-04-16 NOTE — ED NOTES
0430 PT IS AAO X4 BREATHING IS EQUAL AND UNLABORED EXPLAINED TO PT TO ROTATE TYLENOL AND ADVIL EVERY 4 HOURS FOR FEVER PT STATES UNDERSTANDING     Estefania Olmos RN  04/16/18 5163

## 2018-04-17 LAB — BACTERIA SPEC AEROBE CULT: NORMAL

## 2018-04-21 ENCOUNTER — HOSPITAL ENCOUNTER (EMERGENCY)
Facility: HOSPITAL | Age: 43
Discharge: HOME | End: 2018-04-21
Attending: EMERGENCY MEDICINE | Admitting: NURSE PRACTITIONER

## 2018-04-21 ENCOUNTER — APPOINTMENT (OUTPATIENT)
Dept: GENERAL RADIOLOGY | Facility: HOSPITAL | Age: 43
End: 2018-04-21

## 2018-04-21 VITALS
HEIGHT: 69 IN | RESPIRATION RATE: 20 BRPM | DIASTOLIC BLOOD PRESSURE: 74 MMHG | WEIGHT: 145 LBS | TEMPERATURE: 97.7 F | SYSTOLIC BLOOD PRESSURE: 110 MMHG | HEART RATE: 81 BPM | OXYGEN SATURATION: 98 % | BODY MASS INDEX: 21.48 KG/M2

## 2018-04-21 DIAGNOSIS — R11.15 NON-INTRACTABLE CYCLICAL VOMITING WITH NAUSEA: Primary | ICD-10-CM

## 2018-04-21 DIAGNOSIS — G43.709 CHRONIC MIGRAINE WITHOUT AURA WITHOUT STATUS MIGRAINOSUS, NOT INTRACTABLE: ICD-10-CM

## 2018-04-21 LAB
ALBUMIN SERPL-MCNC: 4.1 G/DL (ref 3.5–5)
ALBUMIN/GLOB SERPL: 1.5 G/DL (ref 1.5–2.5)
ALP SERPL-CCNC: 59 U/L (ref 35–104)
ALT SERPL W P-5'-P-CCNC: 20 U/L (ref 10–36)
AMYLASE SERPL-CCNC: 61 U/L (ref 28–100)
ANION GAP SERPL CALCULATED.3IONS-SCNC: 6.9 MMOL/L (ref 3.6–11.2)
AST SERPL-CCNC: 25 U/L (ref 10–30)
BASOPHILS # BLD AUTO: 0.04 10*3/MM3 (ref 0–0.3)
BASOPHILS NFR BLD AUTO: 1 % (ref 0–2)
BILIRUB SERPL-MCNC: 0.6 MG/DL (ref 0.2–1.8)
BUN BLD-MCNC: 10 MG/DL (ref 7–21)
BUN/CREAT SERPL: 19.2 (ref 7–25)
CALCIUM SPEC-SCNC: 8.9 MG/DL (ref 7.7–10)
CHLORIDE SERPL-SCNC: 105 MMOL/L (ref 99–112)
CO2 SERPL-SCNC: 29.1 MMOL/L (ref 24.3–31.9)
CREAT BLD-MCNC: 0.52 MG/DL (ref 0.43–1.29)
D-LACTATE SERPL-SCNC: 0.6 MMOL/L (ref 0.5–2)
DEPRECATED RDW RBC AUTO: 41.6 FL (ref 37–54)
EOSINOPHIL # BLD AUTO: 0.03 10*3/MM3 (ref 0–0.7)
EOSINOPHIL NFR BLD AUTO: 0.7 % (ref 0–5)
ERYTHROCYTE [DISTWIDTH] IN BLOOD BY AUTOMATED COUNT: 13.8 % (ref 11.5–14.5)
GFR SERPL CREATININE-BSD FRML MDRD: 129 ML/MIN/1.73
GLOBULIN UR ELPH-MCNC: 2.7 GM/DL
GLUCOSE BLD-MCNC: 84 MG/DL (ref 70–110)
HCT VFR BLD AUTO: 38.2 % (ref 37–47)
HGB BLD-MCNC: 12.7 G/DL (ref 12–16)
IMM GRANULOCYTES # BLD: 0 10*3/MM3 (ref 0–0.03)
IMM GRANULOCYTES NFR BLD: 0 % (ref 0–0.5)
LIPASE SERPL-CCNC: 63 U/L (ref 13–60)
LYMPHOCYTES # BLD AUTO: 2.78 10*3/MM3 (ref 1–3)
LYMPHOCYTES NFR BLD AUTO: 67.5 % (ref 21–51)
MCH RBC QN AUTO: 27.9 PG (ref 27–33)
MCHC RBC AUTO-ENTMCNC: 33.2 G/DL (ref 33–37)
MCV RBC AUTO: 84 FL (ref 80–94)
MONOCYTES # BLD AUTO: 0.29 10*3/MM3 (ref 0.1–0.9)
MONOCYTES NFR BLD AUTO: 7 % (ref 0–10)
NEUTROPHILS # BLD AUTO: 0.98 10*3/MM3 (ref 1.4–6.5)
NEUTROPHILS NFR BLD AUTO: 23.8 % (ref 30–70)
OSMOLALITY SERPL CALC.SUM OF ELEC: 279.5 MOSM/KG (ref 273–305)
PLATELET # BLD AUTO: 143 10*3/MM3 (ref 130–400)
PMV BLD AUTO: 11 FL (ref 6–10)
POTASSIUM BLD-SCNC: 3.2 MMOL/L (ref 3.5–5.3)
PROT SERPL-MCNC: 6.8 G/DL (ref 6–8)
RBC # BLD AUTO: 4.55 10*6/MM3 (ref 4.2–5.4)
SODIUM BLD-SCNC: 141 MMOL/L (ref 135–153)
WBC NRBC COR # BLD: 4.12 10*3/MM3 (ref 4.5–12.5)

## 2018-04-21 PROCEDURE — 85025 COMPLETE CBC W/AUTO DIFF WBC: CPT | Performed by: NURSE PRACTITIONER

## 2018-04-21 PROCEDURE — 71046 X-RAY EXAM CHEST 2 VIEWS: CPT | Performed by: RADIOLOGY

## 2018-04-21 PROCEDURE — 80053 COMPREHEN METABOLIC PANEL: CPT | Performed by: NURSE PRACTITIONER

## 2018-04-21 PROCEDURE — 71046 X-RAY EXAM CHEST 2 VIEWS: CPT

## 2018-04-21 PROCEDURE — 96375 TX/PRO/DX INJ NEW DRUG ADDON: CPT

## 2018-04-21 PROCEDURE — 96365 THER/PROPH/DIAG IV INF INIT: CPT

## 2018-04-21 PROCEDURE — 25010000002 BUTORPHANOL PER 1 MG: Performed by: NURSE PRACTITIONER

## 2018-04-21 PROCEDURE — 96366 THER/PROPH/DIAG IV INF ADDON: CPT

## 2018-04-21 PROCEDURE — 83605 ASSAY OF LACTIC ACID: CPT | Performed by: NURSE PRACTITIONER

## 2018-04-21 PROCEDURE — 99283 EMERGENCY DEPT VISIT LOW MDM: CPT

## 2018-04-21 PROCEDURE — 83690 ASSAY OF LIPASE: CPT | Performed by: NURSE PRACTITIONER

## 2018-04-21 PROCEDURE — 25010000002 PROMETHAZINE PER 50 MG: Performed by: NURSE PRACTITIONER

## 2018-04-21 PROCEDURE — 25010000002 DIPHENHYDRAMINE PER 50 MG: Performed by: NURSE PRACTITIONER

## 2018-04-21 PROCEDURE — 25010000002 DEXAMETHASONE PER 1 MG: Performed by: NURSE PRACTITIONER

## 2018-04-21 PROCEDURE — 82150 ASSAY OF AMYLASE: CPT | Performed by: NURSE PRACTITIONER

## 2018-04-21 RX ORDER — OXYCODONE HYDROCHLORIDE AND ACETAMINOPHEN 5; 325 MG/1; MG/1
1 TABLET ORAL ONCE
Status: COMPLETED | OUTPATIENT
Start: 2018-04-21 | End: 2018-04-21

## 2018-04-21 RX ORDER — DEXAMETHASONE SODIUM PHOSPHATE 4 MG/ML
10 INJECTION, SOLUTION INTRA-ARTICULAR; INTRALESIONAL; INTRAMUSCULAR; INTRAVENOUS; SOFT TISSUE ONCE
Status: COMPLETED | OUTPATIENT
Start: 2018-04-21 | End: 2018-04-21

## 2018-04-21 RX ORDER — DIPHENHYDRAMINE HYDROCHLORIDE 50 MG/ML
25 INJECTION INTRAMUSCULAR; INTRAVENOUS ONCE
Status: COMPLETED | OUTPATIENT
Start: 2018-04-21 | End: 2018-04-21

## 2018-04-21 RX ORDER — SODIUM CHLORIDE 0.9 % (FLUSH) 0.9 %
10 SYRINGE (ML) INJECTION AS NEEDED
Status: DISCONTINUED | OUTPATIENT
Start: 2018-04-21 | End: 2018-04-21 | Stop reason: HOSPADM

## 2018-04-21 RX ADMIN — SODIUM CHLORIDE 1000 ML: 9 INJECTION, SOLUTION INTRAVENOUS at 18:07

## 2018-04-21 RX ADMIN — DEXAMETHASONE SODIUM PHOSPHATE 10 MG: 4 INJECTION, SOLUTION INTRAMUSCULAR; INTRAVENOUS at 18:08

## 2018-04-21 RX ADMIN — DIPHENHYDRAMINE HYDROCHLORIDE 25 MG: 50 INJECTION INTRAMUSCULAR; INTRAVENOUS at 18:07

## 2018-04-21 RX ADMIN — PROMETHAZINE HYDROCHLORIDE 12.5 MG: 25 INJECTION INTRAMUSCULAR; INTRAVENOUS at 18:07

## 2018-04-21 RX ADMIN — BUTORPHANOL TARTRATE 2 MG: 2 INJECTION, SOLUTION INTRAMUSCULAR; INTRAVENOUS at 18:08

## 2018-04-21 RX ADMIN — OXYCODONE HYDROCHLORIDE AND ACETAMINOPHEN 1 TABLET: 5; 325 TABLET ORAL at 19:28

## 2018-04-21 NOTE — ED PROVIDER NOTES
Subjective     History provided by:  Patient  Vomiting   The primary symptoms include nausea and vomiting. Primary symptoms do not include fever, abdominal pain or dysuria. The illness began 6 to 7 days ago. The onset was sudden. The problem has not changed since onset.  The illness is also significant for chills.   Headache   Pain location:  Generalized  Quality:  Dull  Radiates to:  Does not radiate  Severity currently:  10/10  Severity at highest:  10/10  Onset quality:  Gradual  Timing:  Constant  Progression:  Worsening  Chronicity:  Chronic  Similar to prior headaches: yes    Context: activity and bright light    Relieved by:  Nothing  Worsened by:  Nothing  Associated symptoms: nausea and vomiting    Associated symptoms: no abdominal pain and no fever        Review of Systems   Constitutional: Positive for chills. Negative for fever.   Respiratory: Negative.    Cardiovascular: Negative.  Negative for chest pain.   Gastrointestinal: Positive for nausea and vomiting. Negative for abdominal pain.   Endocrine: Negative.    Genitourinary: Negative.  Negative for dysuria.   Skin: Negative.    Neurological: Positive for headaches.   Psychiatric/Behavioral: Negative.    All other systems reviewed and are negative.      Past Medical History:   Diagnosis Date   • Abdominal pain    • Abdominal swelling    • Hoyos's disease    • Bipolar 1 disorder    • Brain tumor     R Frontal Lobe per pt   • Brain tumor 2014   • Brain tumor (benign)    • Constipation    • DDD (degenerative disc disease), cervical 05/29/2017   • DDD (degenerative disc disease), cervical    • Diarrhea    • Fibromyalgia    • IBS (irritable bowel syndrome)    • Migraine    • Nausea & vomiting    • PONV (postoperative nausea and vomiting)    • PTSD (post-traumatic stress disorder)    • Rectal bleeding        Allergies   Allergen Reactions   • Ativan [Lorazepam] Hallucinations     confusion   • Sulfa Antibiotics Shortness Of Breath and Swelling   •  Compazine [Prochlorperazine Edisylate] Hives   • Demerol [Meperidine] Hives   • Droperidol Itching   • Metoclopramide Swelling   • Toradol [Ketorolac Tromethamine] Hives and Itching   • Zofran [Ondansetron Hcl] Rash       Past Surgical History:   Procedure Laterality Date   • ANAL SCOPE N/A 7/28/2016    Procedure: ANAL SCOPE;  Surgeon: Kael Lopez MD;  Location: [facility] OR;  Service:    • APPENDECTOMY     • COLONOSCOPY N/A 6/30/2016    Procedure: COLONOSCOPY  CPTCODE:[id number];  Surgeon: Jose Antonio Belle III, MD;  Location: [facility] OR;  Service:    • COLONOSCOPY N/A 7/7/2016    Procedure: COLONOSCOPY (25397) CPT;  Surgeon: Jose Antonio Belle III, MD;  Location: [facility] OR;  Service:    • CYSTOSCOPY RETROGRADE PYELOGRAM Bilateral 4/28/2017    Procedure: CYSTOSCOPY RETROGRADE PYELOGRAM;  Surgeon: Mu Blunt MD;  Location: [facility] OR;  Service:    • ENDOSCOPY N/A 6/30/2016    Procedure: ESOPHAGOGASTRODUODENOSCOPY WITH BIOPSY  CPTCODE:[id number];  Surgeon: Jose Antonio Belle III, MD;  Location: [facility] OR;  Service:    • HEMORRHOIDECTOMY N/A 7/28/2016    Procedure: HEMORRHOID STAPLING;  Surgeon: Kael Lopez MD;  Location: [facility] OR;  Service:    • HYSTERECTOMY     • KNEE SURGERY     • LAPAROSCOPIC SALPINGOOPHERECTOMY     • PORTACATH PLACEMENT N/A 7/28/2017    Procedure: INSERTION OF PORTACATH;  Surgeon: Celso Arredondo MD;  Location: [facility] OR;  Service:    • SHOULDER SURGERY      3 times       Family History   Problem Relation Age of Onset   • Crohn's disease Other    • Hypertension Other    • Diabetes Other    • Irritable bowel syndrome Other        Social History     Social History   • Marital status:      Social History Main Topics   • Smoking status: Former Smoker     Packs/day: 1.00     Years: 20.00     Quit date: 11/1/2015   • Smokeless tobacco: Never Used   • Alcohol use No   • Drug use:      Types: Marijuana      Comment: for pain   • Sexual activity: Defer     Other Topics Concern   •  Not on file           Objective   Physical Exam   Constitutional: She is oriented to person, place, and time. She appears well-developed and well-nourished. No distress.   HENT:   Head: Normocephalic and atraumatic.   Right Ear: External ear normal.   Left Ear: External ear normal.   Nose: Nose normal.   Eyes: Conjunctivae and EOM are normal. Pupils are equal, round, and reactive to light.   Neck: Normal range of motion. Neck supple. No JVD present. No tracheal deviation present.   Cardiovascular: Normal rate, regular rhythm and normal heart sounds.    No murmur heard.  Pulmonary/Chest: Effort normal and breath sounds normal. No respiratory distress. She has no wheezes.   Abdominal: Soft. Bowel sounds are normal. There is no tenderness.   Musculoskeletal: Normal range of motion. She exhibits no edema or deformity.   Neurological: She is alert and oriented to person, place, and time. No cranial nerve deficit.   Skin: Skin is warm and dry. No rash noted. She is not diaphoretic. No erythema. No pallor.   Psychiatric: She has a normal mood and affect. Her behavior is normal. Thought content normal.   Nursing note and vitals reviewed.      Procedures         ED Course  ED Course                  MDM  Number of Diagnoses or Management Options  Chronic migraine without aura without status migrainosus, not intractable: new and does not require workup  Non-intractable cyclical vomiting with nausea: new and does not require workup     Amount and/or Complexity of Data Reviewed  Clinical lab tests: reviewed  Tests in the radiology section of CPT®: reviewed    Risk of Complications, Morbidity, and/or Mortality  Presenting problems: low  Diagnostic procedures: low  Management options: low    Patient Progress  Patient progress: improved      Final diagnoses:   Non-intractable cyclical vomiting with nausea   Chronic migraine without aura without status migrainosus, not intractable            Jennifer Gonzlaez, APRN  04/22/18 0110

## 2018-05-06 ENCOUNTER — HOSPITAL ENCOUNTER (EMERGENCY)
Facility: HOSPITAL | Age: 43
Discharge: HOME | End: 2018-05-06
Attending: EMERGENCY MEDICINE | Admitting: EMERGENCY MEDICINE

## 2018-05-06 ENCOUNTER — APPOINTMENT (OUTPATIENT)
Dept: CT IMAGING | Facility: HOSPITAL | Age: 43
End: 2018-05-06

## 2018-05-06 VITALS
DIASTOLIC BLOOD PRESSURE: 78 MMHG | HEIGHT: 69 IN | BODY MASS INDEX: 24.88 KG/M2 | TEMPERATURE: 98 F | OXYGEN SATURATION: 98 % | HEART RATE: 80 BPM | RESPIRATION RATE: 16 BRPM | SYSTOLIC BLOOD PRESSURE: 114 MMHG | WEIGHT: 168 LBS

## 2018-05-06 DIAGNOSIS — R10.9 FLANK PAIN: Primary | ICD-10-CM

## 2018-05-06 LAB
ALBUMIN SERPL-MCNC: 3.9 G/DL (ref 3.5–5)
ALBUMIN/GLOB SERPL: 1.4 G/DL (ref 1.5–2.5)
ALP SERPL-CCNC: 75 U/L (ref 35–104)
ALT SERPL W P-5'-P-CCNC: 42 U/L (ref 10–36)
ANION GAP SERPL CALCULATED.3IONS-SCNC: 7.1 MMOL/L (ref 3.6–11.2)
AST SERPL-CCNC: 37 U/L (ref 10–30)
BACTERIA UR QL AUTO: ABNORMAL /HPF
BASOPHILS # BLD AUTO: 0 10*3/MM3 (ref 0–0.3)
BASOPHILS NFR BLD AUTO: 0 % (ref 0–2)
BILIRUB SERPL-MCNC: 0.7 MG/DL (ref 0.2–1.8)
BILIRUB UR QL STRIP: NEGATIVE
BUN BLD-MCNC: 7 MG/DL (ref 7–21)
BUN/CREAT SERPL: 14 (ref 7–25)
CALCIUM SPEC-SCNC: 9.3 MG/DL (ref 7.7–10)
CHLORIDE SERPL-SCNC: 108 MMOL/L (ref 99–112)
CLARITY UR: CLEAR
CO2 SERPL-SCNC: 22.9 MMOL/L (ref 24.3–31.9)
COLOR UR: YELLOW
CREAT BLD-MCNC: 0.5 MG/DL (ref 0.43–1.29)
DEPRECATED RDW RBC AUTO: 42.4 FL (ref 37–54)
EOSINOPHIL # BLD AUTO: 0.05 10*3/MM3 (ref 0–0.7)
EOSINOPHIL NFR BLD AUTO: 1.1 % (ref 0–5)
ERYTHROCYTE [DISTWIDTH] IN BLOOD BY AUTOMATED COUNT: 13.8 % (ref 11.5–14.5)
GFR SERPL CREATININE-BSD FRML MDRD: 135 ML/MIN/1.73
GLOBULIN UR ELPH-MCNC: 2.7 GM/DL
GLUCOSE BLD-MCNC: 89 MG/DL (ref 70–110)
GLUCOSE UR STRIP-MCNC: NEGATIVE MG/DL
HCT VFR BLD AUTO: 37.3 % (ref 37–47)
HGB BLD-MCNC: 12.2 G/DL (ref 12–16)
HGB UR QL STRIP.AUTO: ABNORMAL
HYALINE CASTS UR QL AUTO: ABNORMAL /LPF
IMM GRANULOCYTES # BLD: 0 10*3/MM3 (ref 0–0.03)
IMM GRANULOCYTES NFR BLD: 0 % (ref 0–0.5)
KETONES UR QL STRIP: NEGATIVE
LEUKOCYTE ESTERASE UR QL STRIP.AUTO: NEGATIVE
LYMPHOCYTES # BLD AUTO: 1.57 10*3/MM3 (ref 1–3)
LYMPHOCYTES NFR BLD AUTO: 35.1 % (ref 21–51)
MCH RBC QN AUTO: 28.2 PG (ref 27–33)
MCHC RBC AUTO-ENTMCNC: 32.7 G/DL (ref 33–37)
MCV RBC AUTO: 86.1 FL (ref 80–94)
MONOCYTES # BLD AUTO: 0.41 10*3/MM3 (ref 0.1–0.9)
MONOCYTES NFR BLD AUTO: 9.2 % (ref 0–10)
NEUTROPHILS # BLD AUTO: 2.44 10*3/MM3 (ref 1.4–6.5)
NEUTROPHILS NFR BLD AUTO: 54.6 % (ref 30–70)
NITRITE UR QL STRIP: NEGATIVE
OSMOLALITY SERPL CALC.SUM OF ELEC: 273.1 MOSM/KG (ref 273–305)
PH UR STRIP.AUTO: 7 [PH] (ref 5–8)
PLATELET # BLD AUTO: 115 10*3/MM3 (ref 130–400)
PMV BLD AUTO: 10.7 FL (ref 6–10)
POTASSIUM BLD-SCNC: 3.9 MMOL/L (ref 3.5–5.3)
PROT SERPL-MCNC: 6.6 G/DL (ref 6–8)
PROT UR QL STRIP: NEGATIVE
RBC # BLD AUTO: 4.33 10*6/MM3 (ref 4.2–5.4)
RBC # UR: ABNORMAL /HPF
REF LAB TEST METHOD: ABNORMAL
SODIUM BLD-SCNC: 138 MMOL/L (ref 135–153)
SP GR UR STRIP: 1.01 (ref 1–1.03)
SQUAMOUS #/AREA URNS HPF: ABNORMAL /HPF
UROBILINOGEN UR QL STRIP: ABNORMAL
WBC NRBC COR # BLD: 4.47 10*3/MM3 (ref 4.5–12.5)
WBC UR QL AUTO: ABNORMAL /HPF

## 2018-05-06 PROCEDURE — 80053 COMPREHEN METABOLIC PANEL: CPT | Performed by: NURSE PRACTITIONER

## 2018-05-06 PROCEDURE — 81001 URINALYSIS AUTO W/SCOPE: CPT | Performed by: NURSE PRACTITIONER

## 2018-05-06 PROCEDURE — 74176 CT ABD & PELVIS W/O CONTRAST: CPT | Performed by: RADIOLOGY

## 2018-05-06 PROCEDURE — 74176 CT ABD & PELVIS W/O CONTRAST: CPT

## 2018-05-06 PROCEDURE — 99283 EMERGENCY DEPT VISIT LOW MDM: CPT

## 2018-05-06 PROCEDURE — 85025 COMPLETE CBC W/AUTO DIFF WBC: CPT | Performed by: NURSE PRACTITIONER

## 2018-05-06 RX ORDER — OXYCODONE HYDROCHLORIDE AND ACETAMINOPHEN 5; 325 MG/1; MG/1
1 TABLET ORAL ONCE
Status: DISCONTINUED | OUTPATIENT
Start: 2018-05-06 | End: 2018-05-06

## 2018-05-06 RX ORDER — PROMETHAZINE HYDROCHLORIDE 25 MG/ML
25 INJECTION, SOLUTION INTRAMUSCULAR; INTRAVENOUS ONCE
Status: DISCONTINUED | OUTPATIENT
Start: 2018-05-06 | End: 2018-05-06

## 2018-05-12 NOTE — ED PROVIDER NOTES
Subjective   Patient left AMA before completing work up as she had a family emergency.         History provided by:  Patient  Blood in Urine   This is a recurrent problem. The current episode started in the past 7 days. The problem is unchanged. The hematuria occurs throughout her entire urinary stream. She reports no clotting in her urine stream. Her pain is at a severity of 4/10. The pain is mild. She describes her urine color as light pink. Irritative symptoms include frequency and urgency. Associated symptoms include flank pain. Pertinent negatives include no abdominal pain, dysuria or fever.       Review of Systems   Constitutional: Negative.  Negative for fever.   HENT: Negative.    Respiratory: Negative.    Cardiovascular: Negative.  Negative for chest pain.   Gastrointestinal: Negative.  Negative for abdominal pain.   Endocrine: Negative.    Genitourinary: Positive for flank pain, frequency, hematuria and urgency. Negative for dysuria.   Skin: Negative.    Neurological: Negative.    Psychiatric/Behavioral: Negative.    All other systems reviewed and are negative.      Past Medical History:   Diagnosis Date   • Abdominal pain    • Abdominal swelling    • Hoyos's disease    • Bipolar 1 disorder    • Brain tumor     R Frontal Lobe per pt   • Brain tumor 2014   • Brain tumor (benign)    • Constipation    • DDD (degenerative disc disease), cervical 05/29/2017   • DDD (degenerative disc disease), cervical    • Diarrhea    • Fibromyalgia    • IBS (irritable bowel syndrome)    • Migraine    • Nausea & vomiting    • PONV (postoperative nausea and vomiting)    • PTSD (post-traumatic stress disorder)    • Rectal bleeding        Allergies   Allergen Reactions   • Ativan [Lorazepam] Hallucinations     confusion   • Sulfa Antibiotics Shortness Of Breath and Swelling   • Compazine [Prochlorperazine Edisylate] Hives   • Demerol [Meperidine] Hives   • Droperidol Itching   • Metoclopramide Swelling   • Toradol [Ketorolac  Tromethamine] Hives and Itching   • Zofran [Ondansetron Hcl] Rash       Past Surgical History:   Procedure Laterality Date   • ANAL SCOPE N/A 7/28/2016    Procedure: ANAL SCOPE;  Surgeon: Kael Lopez MD;  Location: [facility] OR;  Service:    • APPENDECTOMY     • COLONOSCOPY N/A 6/30/2016    Procedure: COLONOSCOPY  CPTCODE:92768;  Surgeon: Jose Antonio Belle III, MD;  Location: [facility] OR;  Service:    • COLONOSCOPY N/A 7/7/2016    Procedure: COLONOSCOPY (99445) CPT;  Surgeon: Jose Antonio Belle III, MD;  Location: [facility] OR;  Service:    • CYSTOSCOPY RETROGRADE PYELOGRAM Bilateral 4/28/2017    Procedure: CYSTOSCOPY RETROGRADE PYELOGRAM;  Surgeon: Mu Blunt MD;  Location: [facility] OR;  Service:    • ENDOSCOPY N/A 6/30/2016    Procedure: ESOPHAGOGASTRODUODENOSCOPY WITH BIOPSY  CPTCODE:82116;  Surgeon: Jose Antonio Belle III, MD;  Location: [facility] OR;  Service:    • HEMORRHOIDECTOMY N/A 7/28/2016    Procedure: HEMORRHOID STAPLING;  Surgeon: Kael Lopez MD;  Location: [facility] OR;  Service:    • HYSTERECTOMY     • KNEE SURGERY     • LAPAROSCOPIC SALPINGOOPHERECTOMY     • PORTACATH PLACEMENT N/A 7/28/2017    Procedure: INSERTION OF PORTACATH;  Surgeon: Celso Arredondo MD;  Location: [facility] OR;  Service:    • SHOULDER SURGERY      3 times       Family History   Problem Relation Age of Onset   • Crohn's disease Other    • Hypertension Other    • Diabetes Other    • Irritable bowel syndrome Other        Social History     Social History   • Marital status:      Social History Main Topics   • Smoking status: Former Smoker     Packs/day: 1.00     Years: 20.00     Quit date: 11/1/2015   • Smokeless tobacco: Never Used   • Alcohol use No   • Drug use:      Types: Marijuana      Comment: for pain   • Sexual activity: Defer     Other Topics Concern   • Not on file           Objective   Physical Exam   Constitutional: She is oriented to person, place, and time. She appears well-developed and  well-nourished. No distress.   HENT:   Head: Normocephalic and atraumatic.   Right Ear: External ear normal.   Left Ear: External ear normal.   Nose: Nose normal.   Eyes: Conjunctivae and EOM are normal. Pupils are equal, round, and reactive to light.   Neck: Normal range of motion. Neck supple. No JVD present. No tracheal deviation present.   Cardiovascular: Normal rate, regular rhythm and normal heart sounds.    No murmur heard.  Pulmonary/Chest: Effort normal and breath sounds normal. No respiratory distress. She has no wheezes.   Abdominal: Soft. Bowel sounds are normal. There is no tenderness.   Musculoskeletal: Normal range of motion. She exhibits no edema or deformity.   Neurological: She is alert and oriented to person, place, and time. No cranial nerve deficit.   Skin: Skin is warm and dry. No rash noted. She is not diaphoretic. No erythema. No pallor.   Psychiatric: She has a normal mood and affect. Her behavior is normal. Thought content normal.   Nursing note and vitals reviewed.      Procedures           ED Course  ED Course                  MDM      Final diagnoses:   Flank pain            Jennifer Gonzalez, APRN  05/12/18 1522

## 2018-05-14 ENCOUNTER — HOSPITAL ENCOUNTER (EMERGENCY)
Facility: HOSPITAL | Age: 43
Discharge: LEFT BEFORE DISCHARGE | End: 2018-05-14

## 2018-05-14 VITALS
WEIGHT: 168 LBS | RESPIRATION RATE: 18 BRPM | SYSTOLIC BLOOD PRESSURE: 115 MMHG | TEMPERATURE: 97.9 F | BODY MASS INDEX: 24.88 KG/M2 | OXYGEN SATURATION: 99 % | DIASTOLIC BLOOD PRESSURE: 56 MMHG | HEART RATE: 75 BPM | HEIGHT: 69 IN

## 2018-05-14 NOTE — ED NOTES
Pt stated that she could not wait any longer. Pt would not wait to talk to charge or triage nurse, and stated that she had driven 2 hours to get here and she would just go home and sleep. Pt stated that she understood that we are busy and stated that she did not blame us, she just felt that she could no longer wait. Triage and Charge nurses made aware.     Saleem Garrison  05/14/18 [id number]

## 2018-05-15 ENCOUNTER — OFFICE VISIT (OUTPATIENT)
Dept: UROLOGY | Facility: CLINIC | Age: 43
End: 2018-05-15

## 2018-05-15 VITALS — HEIGHT: 69 IN | WEIGHT: 168 LBS | BODY MASS INDEX: 24.88 KG/M2

## 2018-05-15 DIAGNOSIS — Z48.816 AFTERCARE FOLLOWING SURGERY OF THE GENITOURINARY SYSTEM: ICD-10-CM

## 2018-05-15 DIAGNOSIS — F52.0 HYPOACTIVE SEXUAL DESIRE DISORDER: ICD-10-CM

## 2018-05-15 DIAGNOSIS — IMO0002 URETHRAL STENOSIS: Primary | ICD-10-CM

## 2018-05-15 PROCEDURE — 99213 OFFICE O/P EST LOW 20 MIN: CPT | Performed by: UROLOGY

## 2018-05-15 PROCEDURE — 96372 THER/PROPH/DIAG INJ SC/IM: CPT | Performed by: UROLOGY

## 2018-05-15 PROCEDURE — 53661 DILATION OF URETHRA: CPT | Performed by: UROLOGY

## 2018-05-15 RX ORDER — OXYCODONE AND ACETAMINOPHEN 10; 325 MG/1; MG/1
1 TABLET ORAL EVERY 6 HOURS PRN
Qty: 12 TABLET | Refills: 0 | Status: ON HOLD | OUTPATIENT
Start: 2018-05-15 | End: 2018-06-06

## 2018-05-15 RX ORDER — GENTAMICIN SULFATE 40 MG/ML
80 INJECTION, SOLUTION INTRAMUSCULAR; INTRAVENOUS ONCE
Status: COMPLETED | OUTPATIENT
Start: 2018-05-15 | End: 2018-05-15

## 2018-05-15 RX ORDER — TESTOSTERONE CYPIONATE 200 MG/ML
50 INJECTION, SOLUTION INTRAMUSCULAR ONCE
Status: COMPLETED | OUTPATIENT
Start: 2018-05-15 | End: 2018-05-15

## 2018-05-15 RX ADMIN — GENTAMICIN SULFATE 80 MG: 40 INJECTION, SOLUTION INTRAMUSCULAR; INTRAVENOUS at 13:05

## 2018-05-15 RX ADMIN — TESTOSTERONE CYPIONATE 50 MG: 200 INJECTION, SOLUTION INTRAMUSCULAR at 13:23

## 2018-05-15 NOTE — PROGRESS NOTES
Chief Complaint:          Chief Complaint   Patient presents with   • Dilation       HPI:   43 y.o. female.  43 y.o. female.  43 year old white female. Accompanied by  with IgA nephropathy, hepatitis C, severe urethral stenosis who presents for urethral dilation.  She is desirous of more pain medication but I explained to her carefully as long as she is on hydrocodone and I will not give her any more pain medication.  And if she's not on it she can only have several pills around the time of her dilation.  I also explained to her there is no permanent fix of the situation other than self dilatation which she refuses to do.Today she wants to learn to do intermittent catheter.  She gets pain medication from her primary care provider and she will be given pain medication only for short supply after painful dilatation this is been discussed with her primary care physician he will have dilatation no more than every 6 weeks in this office.  She returns today for follow-up.  She is now doing dramatically better.  She is now catheterizing herself intermittently.  It's helped dramatically I went ahead after prep and drape in a sterile fashion and dilated her sequentially to 24 [place] which was the max she can take.  There were no complications.  Overall, she's doing well, she is not gotten into see the nephrologist as yet for inexplicable reasons.  I reaffirmed the importance of a workup of her IgA nephropathy.  She is here for follow-up she wants to be dilated.  I went ahead and recommended this.  She was dilated without complication to 26 [place].  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  He also complains of significant loss of libido and requests a  testosterone injection.  And a long discussion of the complications including clitorimegaly, hair growth, nipple hair growth etc. and she wishes to proceed.  She got a nice response from a testosterone and wants it repeated at a low dose she's here for dilation which was accomplished without complication    Past Medical History:        Past Medical History:   Diagnosis Date   • Abdominal pain    • Abdominal swelling    • Hoyos's disease    • Bipolar 1 disorder    • Brain tumor     R Frontal Lobe per pt   • Brain tumor 2014   • Brain tumor (benign)    • Constipation    • DDD (degenerative disc disease), cervical 05/29/2017   • DDD (degenerative disc disease), cervical    • Diarrhea    • Fibromyalgia    • IBS (irritable bowel syndrome)    • Migraine    • Nausea & vomiting    • PONV (postoperative nausea and vomiting)    • PTSD (post-traumatic stress disorder)    • Rectal bleeding          Current Meds:     Current Outpatient Prescriptions   Medication Sig Dispense Refill   • busPIRone (BUSPAR) 15 MG tablet Take 15 mg by mouth 3 (three) times a day        • butorphanol (STADOL) 10 MG/ML nasal spray 1 spray into each nostril Daily.     • cetirizine (zyrTEC) 10 MG tablet Take 1 tablet by mouth Daily. 30 tablet 0   • divalproex (DEPAKOTE) 500 MG DR tablet Take 250 mg by mouth 3 (Three) Times a Day.     • oxyCODONE-acetaminophen (PERCOCET)  MG per tablet Take 1 tablet by mouth Every 6 (Six) Hours As Needed for Moderate Pain . 12 tablet 0   • pantoprazole (PROTONIX) 40 MG EC tablet Take 40 mg by mouth Daily As Needed.     • promethazine (PHENERGAN) 25 MG tablet Take 1 tablet by mouth Every 6 (Six) Hours As Needed for Nausea or Vomiting. 30 tablet 0   • sertraline (ZOLOFT) 100 MG tablet Take 100 mg by mouth 2 (Two) Times a Day.     • SUMAtriptan (IMITREX) 6 MG/0.5ML solution injection Inject 6 mg under the skin 1 (One) Time.       Current Facility-Administered Medications   Medication Dose Route Frequency Provider  Last Rate Last Dose   • Testosterone Cypionate (DEPOTESTOTERONE CYPIONATE) injection 100 mg  100 mg Intramuscular Once Mu Blunt MD            Allergies:      Allergies   Allergen Reactions   • Ativan [Lorazepam] Hallucinations     confusion   • Sulfa Antibiotics Shortness Of Breath and Swelling   • Compazine [Prochlorperazine Edisylate] Hives   • Demerol [Meperidine] Hives   • Droperidol Itching   • Metoclopramide Swelling   • Toradol [Ketorolac Tromethamine] Hives and Itching   • Zofran [Ondansetron Hcl] Rash        Past Surgical History:     Past Surgical History:   Procedure Laterality Date   • ANAL SCOPE N/A 7/28/2016    Procedure: ANAL SCOPE;  Surgeon: Kael Lopez MD;  Location: [facility] OR;  Service:    • APPENDECTOMY     • COLONOSCOPY N/A 6/30/2016    Procedure: COLONOSCOPY  CPTCODE:10903;  Surgeon: Jose Antonio Belle III, MD;  Location: [facility] OR;  Service:    • COLONOSCOPY N/A 7/7/2016    Procedure: COLONOSCOPY (03301) CPT;  Surgeon: Jose Antonio Belle III, MD;  Location: [facility] OR;  Service:    • CYSTOSCOPY RETROGRADE PYELOGRAM Bilateral 4/28/2017    Procedure: CYSTOSCOPY RETROGRADE PYELOGRAM;  Surgeon: Mu Blunt MD;  Location: [facility] OR;  Service:    • ENDOSCOPY N/A 6/30/2016    Procedure: ESOPHAGOGASTRODUODENOSCOPY WITH BIOPSY  CPTCODE:61920;  Surgeon: Jose Antonio Belle III, MD;  Location: [facility] OR;  Service:    • HEMORRHOIDECTOMY N/A 7/28/2016    Procedure: HEMORRHOID STAPLING;  Surgeon: Kael Lopez MD;  Location: [facility] OR;  Service:    • HYSTERECTOMY     • KNEE SURGERY     • LAPAROSCOPIC SALPINGOOPHERECTOMY     • PORTACATH PLACEMENT N/A 7/28/2017    Procedure: INSERTION OF PORTACATH;  Surgeon: Celso Arredondo MD;  Location: [facility] OR;  Service:    • SHOULDER SURGERY      3 times         Social History:     Social History     Social History   • Marital status:      Spouse name: N/A   • Number of children: N/A   • Years of education: N/A     Occupational  History   • Not on file.     Social History Main Topics   • Smoking status: Former Smoker     Packs/day: 1.00     Years: 20.00     Quit date: 11/1/2015   • Smokeless tobacco: Never Used   • Alcohol use No   • Drug use:      Types: Marijuana      Comment: for pain   • Sexual activity: Defer     Other Topics Concern   • Not on file     Social History Narrative   • No narrative on file       Family History:     Family History   Problem Relation Age of Onset   • Crohn's disease Other    • Hypertension Other    • Diabetes Other    • Irritable bowel syndrome Other        Review of Systems:     Review of Systems   Constitutional: Negative.  Negative for activity change, appetite change, chills, diaphoresis, fatigue and unexpected weight change.   HENT: Negative for congestion, dental problem, drooling, ear discharge, ear pain, facial swelling, hearing loss, mouth sores, nosebleeds, postnasal drip, rhinorrhea, sinus pressure, sneezing, sore throat, tinnitus, trouble swallowing and voice change.    Eyes: Negative.  Negative for photophobia, pain, discharge, redness, itching and visual disturbance.   Respiratory: Negative.  Negative for apnea, cough, choking, chest tightness, shortness of breath, wheezing and stridor.    Cardiovascular: Negative.  Negative for chest pain, palpitations and leg swelling.   Gastrointestinal: Negative.  Negative for abdominal distention, abdominal pain, anal bleeding, blood in stool, constipation, diarrhea, nausea, rectal pain and vomiting.   Endocrine: Negative.  Negative for cold intolerance, heat intolerance, polydipsia, polyphagia and polyuria.   Genitourinary: Positive for difficulty urinating.   Musculoskeletal: Negative.  Negative for arthralgias, back pain, gait problem, joint swelling, myalgias, neck pain and neck stiffness.   Skin: Negative.  Negative for color change, pallor, rash and wound.   Allergic/Immunologic: Negative.  Negative for environmental allergies, food allergies and  immunocompromised state.   Neurological: Negative.  Negative for dizziness, tremors, seizures, syncope, facial asymmetry, speech difficulty, weakness, light-headedness, numbness and headaches.   Hematological: Negative.  Negative for adenopathy. Does not bruise/bleed easily.   Psychiatric/Behavioral: Negative for agitation, behavioral problems, confusion, decreased concentration, dysphoric mood, hallucinations, self-injury, sleep disturbance and suicidal ideas. The patient is not nervous/anxious and is not hyperactive.    All other systems reviewed and are negative.      Physical Exam:     Physical Exam   Constitutional: She appears well-developed and well-nourished.   HENT:   Head: Normocephalic and atraumatic.   Right Ear: External ear normal.   Left Ear: External ear normal.   Mouth/Throat: Oropharynx is clear and moist.   Eyes: Conjunctivae are normal. Pupils are equal, round, and reactive to light.   Cardiovascular: Normal rate, regular rhythm, normal heart sounds and intact distal pulses.    Pulmonary/Chest: Effort normal and breath sounds normal.   Abdominal: Soft. Bowel sounds are normal. She exhibits no distension and no mass. There is no tenderness. There is no rebound and no guarding.   Genitourinary: Vagina normal. No vaginal discharge found.   Musculoskeletal: Normal range of motion.   Neurological: She is alert. She has normal reflexes.   Skin: Skin is warm and dry.   Psychiatric: She has a normal mood and affect. Her behavior is normal. Judgment and thought content normal.       I have reviewed the following portions of the patient's history: allergies, current medications, past family history, past medical history, past social history, past surgical history, problem list and ROS and confirm it's accurate.      Procedure:      Urethral dilation-after an appropriate informed consent the patient was brought to the procedure suite.  The urethra was gently anesthetized with 10 cc of 2% viscous Xylocaine  jelly.  After an adequate period of topical anesthesia and dilated with [place] sounds from [address] sequentially without complication the patient was given gentamicin as prophylaxis with 80 mg    Assessment/Plan:   Urethral stenosis- s/ post dilation   Hypoactive sexual desire disorder- testosterone treatment today  Narcotic pain medication-patient has significant acute pain that I believe would be an indication for the use of narcotic pain medication.  I discussed the significant risks of pain medication and the fact that this will be a short only option and I will give her no more than a three-day supply of pain medication.  And I will not plan long-term medication and that this will be sent to a pain clinic if at all becomes necessary.  We discussed signing a pain medication agreement and the fact that we're going to run a state [facility] review to be sure the patient is not getting pain medication from elsewhere.  If this is the case we will not give pain medication.  As part of the patient's treatment plan of there being prescribed a controlled substance with potential for abuse.  This patient has been wait aware of the appropriate dose of such medications including, the risk for somnolence, limited ability to drive and/or safety and the significant potential for overdose.  It is been made clear that these medications are for the prescribed patient only without concomitant use of alcohol or other sepsis unless prescribed by the medical provider.  Has completed prescribing agreement detailing the terms of continue prescribing him a controlled substance.  Including monitoring Luis Alberto ports, the possibility of urine drug screens and pedal counts.  The patient is aware that we reviewed LUIS ALBERTO reports on a regular basis and scan them into the chart.  History and physical examination exhibited continued safe and appropriate use of controlled substances.  Also discussed the fact that the new [place] legislation  allows only a three-day prescription for pain medication.  In this situation he will be referred to a chronic pain clinic.  Given 12 pain pills after the procedure which is apparently been okayed by her pain clinic.     Discussed the patient's BMI with her. BMI is above normal parameters. Follow-up plan includes:  educational material.                     This document has been electronically signed by VERENICE FINK MD May 15, 2018 1:03 PM

## 2018-05-18 ENCOUNTER — APPOINTMENT (OUTPATIENT)
Dept: CT IMAGING | Facility: HOSPITAL | Age: 43
End: 2018-05-18

## 2018-05-18 ENCOUNTER — HOSPITAL ENCOUNTER (EMERGENCY)
Facility: HOSPITAL | Age: 43
Discharge: HOME | End: 2018-05-19
Attending: EMERGENCY MEDICINE | Admitting: EMERGENCY MEDICINE

## 2018-05-18 VITALS
HEART RATE: 70 BPM | WEIGHT: 169 LBS | TEMPERATURE: 98.2 F | SYSTOLIC BLOOD PRESSURE: 130 MMHG | HEIGHT: 69 IN | DIASTOLIC BLOOD PRESSURE: 64 MMHG | RESPIRATION RATE: 18 BRPM | BODY MASS INDEX: 25.03 KG/M2 | OXYGEN SATURATION: 100 %

## 2018-05-18 DIAGNOSIS — R10.9 ABDOMINAL PAIN OF UNKNOWN CAUSE: Primary | ICD-10-CM

## 2018-05-18 DIAGNOSIS — K92.1 HEMATOCHEZIA: ICD-10-CM

## 2018-05-18 LAB
ALBUMIN SERPL-MCNC: 4.1 G/DL (ref 3.5–5)
ALBUMIN/GLOB SERPL: 1.8 G/DL (ref 1.5–2.5)
ALP SERPL-CCNC: 70 U/L (ref 35–104)
ALT SERPL W P-5'-P-CCNC: 45 U/L (ref 10–36)
ANION GAP SERPL CALCULATED.3IONS-SCNC: 4.8 MMOL/L (ref 3.6–11.2)
AST SERPL-CCNC: 39 U/L (ref 10–30)
BASOPHILS # BLD AUTO: 0.01 10*3/MM3 (ref 0–0.3)
BASOPHILS NFR BLD AUTO: 0.2 % (ref 0–2)
BILIRUB SERPL-MCNC: 0.6 MG/DL (ref 0.2–1.8)
BILIRUB UR QL STRIP: NEGATIVE
BUN BLD-MCNC: 7 MG/DL (ref 7–21)
BUN/CREAT SERPL: 12.5 (ref 7–25)
CALCIUM SPEC-SCNC: 8.9 MG/DL (ref 7.7–10)
CHLORIDE SERPL-SCNC: 106 MMOL/L (ref 99–112)
CLARITY UR: CLEAR
CO2 SERPL-SCNC: 29.2 MMOL/L (ref 24.3–31.9)
COLOR UR: YELLOW
CREAT BLD-MCNC: 0.56 MG/DL (ref 0.43–1.29)
DEPRECATED RDW RBC AUTO: 45.4 FL (ref 37–54)
DEVELOPER EXPIRATION DATE: ABNORMAL
DEVELOPER LOT NUMBER: ABNORMAL
EOSINOPHIL # BLD AUTO: 0.1 10*3/MM3 (ref 0–0.7)
EOSINOPHIL NFR BLD AUTO: 1.9 % (ref 0–5)
ERYTHROCYTE [DISTWIDTH] IN BLOOD BY AUTOMATED COUNT: 14.4 % (ref 11.5–14.5)
EXPIRATION DATE: ABNORMAL
FECAL OCCULT BLOOD SCREEN, POC: POSITIVE
GFR SERPL CREATININE-BSD FRML MDRD: 118 ML/MIN/1.73
GLOBULIN UR ELPH-MCNC: 2.3 GM/DL
GLUCOSE BLD-MCNC: 98 MG/DL (ref 70–110)
GLUCOSE UR STRIP-MCNC: NEGATIVE MG/DL
HCT VFR BLD AUTO: 40.1 % (ref 37–47)
HGB BLD-MCNC: 13 G/DL (ref 12–16)
HGB UR QL STRIP.AUTO: NEGATIVE
HOLD SPECIMEN: NORMAL
HOLD SPECIMEN: NORMAL
IMM GRANULOCYTES # BLD: 0.01 10*3/MM3 (ref 0–0.03)
IMM GRANULOCYTES NFR BLD: 0.2 % (ref 0–0.5)
KETONES UR QL STRIP: NEGATIVE
LEUKOCYTE ESTERASE UR QL STRIP.AUTO: NEGATIVE
LIPASE SERPL-CCNC: 41 U/L (ref 13–60)
LYMPHOCYTES # BLD AUTO: 2.32 10*3/MM3 (ref 1–3)
LYMPHOCYTES NFR BLD AUTO: 43 % (ref 21–51)
Lab: ABNORMAL
MCH RBC QN AUTO: 28.1 PG (ref 27–33)
MCHC RBC AUTO-ENTMCNC: 32.4 G/DL (ref 33–37)
MCV RBC AUTO: 86.6 FL (ref 80–94)
MONOCYTES # BLD AUTO: 0.4 10*3/MM3 (ref 0.1–0.9)
MONOCYTES NFR BLD AUTO: 7.4 % (ref 0–10)
NEGATIVE CONTROL: NEGATIVE
NEUTROPHILS # BLD AUTO: 2.56 10*3/MM3 (ref 1.4–6.5)
NEUTROPHILS NFR BLD AUTO: 47.3 % (ref 30–70)
NITRITE UR QL STRIP: NEGATIVE
OSMOLALITY SERPL CALC.SUM OF ELEC: 277.3 MOSM/KG (ref 273–305)
PH UR STRIP.AUTO: 8 [PH] (ref 5–8)
PLATELET # BLD AUTO: 152 10*3/MM3 (ref 130–400)
PMV BLD AUTO: 10.9 FL (ref 6–10)
POSITIVE CONTROL: POSITIVE
POTASSIUM BLD-SCNC: 3.4 MMOL/L (ref 3.5–5.3)
PROT SERPL-MCNC: 6.4 G/DL (ref 6–8)
PROT UR QL STRIP: NEGATIVE
RBC # BLD AUTO: 4.63 10*6/MM3 (ref 4.2–5.4)
SODIUM BLD-SCNC: 140 MMOL/L (ref 135–153)
SP GR UR STRIP: 1.01 (ref 1–1.03)
UROBILINOGEN UR QL STRIP: NORMAL
WBC NRBC COR # BLD: 5.4 10*3/MM3 (ref 4.5–12.5)
WHOLE BLOOD HOLD SPECIMEN: NORMAL
WHOLE BLOOD HOLD SPECIMEN: NORMAL

## 2018-05-18 PROCEDURE — 25010000002 DIPHENHYDRAMINE PER 50 MG: Performed by: EMERGENCY MEDICINE

## 2018-05-18 PROCEDURE — 85025 COMPLETE CBC W/AUTO DIFF WBC: CPT | Performed by: FAMILY MEDICINE

## 2018-05-18 PROCEDURE — 99284 EMERGENCY DEPT VISIT MOD MDM: CPT

## 2018-05-18 PROCEDURE — 96375 TX/PRO/DX INJ NEW DRUG ADDON: CPT

## 2018-05-18 PROCEDURE — 25010000002 IOPAMIDOL 61 % SOLUTION: Performed by: EMERGENCY MEDICINE

## 2018-05-18 PROCEDURE — 25010000002 MORPHINE PER 10 MG: Performed by: EMERGENCY MEDICINE

## 2018-05-18 PROCEDURE — 81003 URINALYSIS AUTO W/O SCOPE: CPT | Performed by: FAMILY MEDICINE

## 2018-05-18 PROCEDURE — 36415 COLL VENOUS BLD VENIPUNCTURE: CPT

## 2018-05-18 PROCEDURE — 83690 ASSAY OF LIPASE: CPT | Performed by: FAMILY MEDICINE

## 2018-05-18 PROCEDURE — 96376 TX/PRO/DX INJ SAME DRUG ADON: CPT

## 2018-05-18 PROCEDURE — 74177 CT ABD & PELVIS W/CONTRAST: CPT | Performed by: RADIOLOGY

## 2018-05-18 PROCEDURE — 80053 COMPREHEN METABOLIC PANEL: CPT | Performed by: FAMILY MEDICINE

## 2018-05-18 PROCEDURE — 96374 THER/PROPH/DIAG INJ IV PUSH: CPT

## 2018-05-18 PROCEDURE — 25010000002 PROMETHAZINE PER 50 MG: Performed by: EMERGENCY MEDICINE

## 2018-05-18 PROCEDURE — 82270 OCCULT BLOOD FECES: CPT | Performed by: EMERGENCY MEDICINE

## 2018-05-18 PROCEDURE — 74177 CT ABD & PELVIS W/CONTRAST: CPT

## 2018-05-18 RX ORDER — MORPHINE SULFATE 2 MG/ML
2 INJECTION, SOLUTION INTRAMUSCULAR; INTRAVENOUS ONCE
Status: COMPLETED | OUTPATIENT
Start: 2018-05-18 | End: 2018-05-18

## 2018-05-18 RX ORDER — DIPHENHYDRAMINE HYDROCHLORIDE 50 MG/ML
12.5 INJECTION INTRAMUSCULAR; INTRAVENOUS ONCE
Status: COMPLETED | OUTPATIENT
Start: 2018-05-18 | End: 2018-05-18

## 2018-05-18 RX ORDER — PROMETHAZINE HYDROCHLORIDE 25 MG/ML
12.5 INJECTION, SOLUTION INTRAMUSCULAR; INTRAVENOUS ONCE
Status: COMPLETED | OUTPATIENT
Start: 2018-05-18 | End: 2018-05-18

## 2018-05-18 RX ORDER — SODIUM CHLORIDE 0.9 % (FLUSH) 0.9 %
10 SYRINGE (ML) INJECTION AS NEEDED
Status: DISCONTINUED | OUTPATIENT
Start: 2018-05-18 | End: 2018-05-19 | Stop reason: HOSPADM

## 2018-05-18 RX ADMIN — PROMETHAZINE HYDROCHLORIDE 12.5 MG: 25 INJECTION INTRAMUSCULAR; INTRAVENOUS at 23:54

## 2018-05-18 RX ADMIN — PROMETHAZINE HYDROCHLORIDE 12.5 MG: 25 INJECTION INTRAMUSCULAR; INTRAVENOUS at 20:30

## 2018-05-18 RX ADMIN — MORPHINE SULFATE 2 MG: 2 INJECTION, SOLUTION INTRAMUSCULAR; INTRAVENOUS at 23:53

## 2018-05-18 RX ADMIN — IOPAMIDOL 90 ML: 612 INJECTION, SOLUTION INTRAVENOUS at 21:56

## 2018-05-18 RX ADMIN — DIPHENHYDRAMINE HYDROCHLORIDE 12.5 MG: 50 INJECTION INTRAMUSCULAR; INTRAVENOUS at 20:30

## 2018-05-18 RX ADMIN — MORPHINE SULFATE 4 MG: 4 INJECTION, SOLUTION INTRAMUSCULAR; INTRAVENOUS at 20:30

## 2018-05-18 RX ADMIN — SODIUM CHLORIDE 1000 ML: 9 INJECTION, SOLUTION INTRAVENOUS at 20:31

## 2018-05-18 NOTE — ED NOTES
Attempted to draw labs in triage. I was able to get a CBC but not enough blood for all orders. Pt states that she did have a port and we could get the blood when she got a room. Lead RN made aware.      Jake Arciniega  05/18/18 [id number]

## 2018-05-18 NOTE — ED NOTES
Pt reassessed at this time, c/o pain. Pt updated that wait is due to high volume of patients in the ER. assured pt that she would go to a room as soon as possible, pt v/u. Pt back to lobby with family.      Rajendra Hatch RN  05/18/18 [id number]

## 2018-05-18 NOTE — ED PROVIDER NOTES
Subjective   43-year-old female is presenting with a 2 day history of left lower quadrant pain.  She is reporting that she has had blood in her stool.  She reports that she is having diarrhea times and at other times she is having soft normal stools.  She denies any fevers or chills.  She does report some nausea and vomiting.  She states she has a history of hemorrhoids.  She has had a colonoscopy previously which showed gastritis and internal hemorrhoids.  She has had clipping of these hemorrhoids previously.  The patient does not take any blood thinners.            Review of Systems   Constitutional: Positive for chills. Negative for fever.   Respiratory: Negative for shortness of breath.    Cardiovascular: Negative for chest pain.   Gastrointestinal: Positive for blood in stool and diarrhea.   Genitourinary: Negative for dysuria.   Musculoskeletal: Positive for myalgias.   Skin: Negative for pallor.   Neurological: Negative for dizziness.   Hematological: Does not bruise/bleed easily.   Psychiatric/Behavioral: The patient is nervous/anxious.        Past Medical History:   Diagnosis Date   • Abdominal pain    • Abdominal swelling    • Hoyos's disease    • Bipolar 1 disorder    • Brain tumor     R Frontal Lobe per pt   • Brain tumor 2014   • Brain tumor (benign)    • Constipation    • DDD (degenerative disc disease), cervical 05/29/2017   • DDD (degenerative disc disease), cervical    • Diarrhea    • Fibromyalgia    • IBS (irritable bowel syndrome)    • Migraine    • Nausea & vomiting    • PONV (postoperative nausea and vomiting)    • PTSD (post-traumatic stress disorder)    • Rectal bleeding        Allergies   Allergen Reactions   • Ativan [Lorazepam] Hallucinations     confusion   • Sulfa Antibiotics Shortness Of Breath and Swelling   • Compazine [Prochlorperazine Edisylate] Hives   • Demerol [Meperidine] Hives   • Droperidol Itching   • Metoclopramide Swelling   • Toradol [Ketorolac Tromethamine] Hives and  Itching   • Zofran [Ondansetron Hcl] Rash       Past Surgical History:   Procedure Laterality Date   • ANAL SCOPE N/A 7/28/2016    Procedure: ANAL SCOPE;  Surgeon: Kael Lopez MD;  Location: [facility] OR;  Service:    • APPENDECTOMY     • COLONOSCOPY N/A 6/30/2016    Procedure: COLONOSCOPY  CPTCODE:46954;  Surgeon: Jose Antonio Belle III, MD;  Location: [facility] OR;  Service:    • COLONOSCOPY N/A 7/7/2016    Procedure: COLONOSCOPY (27766) CPT;  Surgeon: Jose Antonio Belle III, MD;  Location: [facility] OR;  Service:    • CYSTOSCOPY RETROGRADE PYELOGRAM Bilateral 4/28/2017    Procedure: CYSTOSCOPY RETROGRADE PYELOGRAM;  Surgeon: Mu Blunt MD;  Location: [facility] OR;  Service:    • ENDOSCOPY N/A 6/30/2016    Procedure: ESOPHAGOGASTRODUODENOSCOPY WITH BIOPSY  CPTCODE:81104;  Surgeon: Jose Antonio Belle III, MD;  Location: [facility] OR;  Service:    • HEMORRHOIDECTOMY N/A 7/28/2016    Procedure: HEMORRHOID STAPLING;  Surgeon: Kael Lopez MD;  Location: [facility] OR;  Service:    • HYSTERECTOMY     • KNEE SURGERY     • LAPAROSCOPIC SALPINGOOPHERECTOMY     • PORTACATH PLACEMENT N/A 7/28/2017    Procedure: INSERTION OF PORTACATH;  Surgeon: Celso Arredondo MD;  Location: [facility] OR;  Service:    • SHOULDER SURGERY      3 times       Family History   Problem Relation Age of Onset   • Crohn's disease Other    • Hypertension Other    • Diabetes Other    • Irritable bowel syndrome Other        Social History     Social History   • Marital status:      Social History Main Topics   • Smoking status: Former Smoker     Packs/day: 1.00     Years: 20.00     Quit date: 11/1/2015   • Smokeless tobacco: Never Used   • Alcohol use No   • Drug use: Yes     Types: Marijuana      Comment: for pain   • Sexual activity: Defer     Other Topics Concern   • Not on file           Objective   Physical Exam   Constitutional: She appears well-developed and well-nourished.   HENT:   Head: Normocephalic.   Right Ear: External ear  normal.   Left Ear: External ear normal.   Mouth/Throat: Oropharynx is clear and moist.   Eyes: Conjunctivae are normal.   Neck: Neck supple.   Cardiovascular: Normal rate, regular rhythm, normal heart sounds and intact distal pulses.    Pulmonary/Chest: Effort normal and breath sounds normal.   Abdominal: Soft. She exhibits no mass. There is tenderness. There is guarding. There is no rebound.   Possible overreaction? Patient appears to be in pain with anticipation of touch   Genitourinary: Rectal exam shows guaiac positive stool (mildly positive ).   Neurological: She is alert. She exhibits normal muscle tone. Coordination normal.   Skin: Skin is warm. Capillary refill takes less than 2 seconds.       Procedures           ED Course                  MDM  Number of Diagnoses or Management Options  Abdominal pain of unknown cause:   Hematochezia:   Diagnosis management comments: 3-year-old female is presenting with left or quadrant abdominal pain.  She is also complaining of some rectal bleeding.  Her Hemoccult is mildly positive, however her labs are within normal limits and her vitals are stable.  The patient has had a prior history of this, has a relationship with gastroenterology already.  She was with several repeat colonoscopy however she missed it.  She is going to follow up with Dr. Belle.  Since no definite cause for the patient's symptoms were found today I have suggested that she come back to the emergency room if she has any worsening symptoms.    Patient Progress  Patient progress: stable        Final diagnoses:   Abdominal pain of unknown cause   Hematochezia            Audra Espinosa DO  05/19/18 2812

## 2018-05-19 NOTE — DISCHARGE INSTRUCTIONS
Please follow up with your doctor   No definite cause was found for your symptoms today  If you have any worsening symptoms, increased pain, then please present to an ER for further care. If at anytime you have any worsening pain, please have a physician perform an exam on your belly   Please see GI for follow up.

## 2018-06-04 ENCOUNTER — APPOINTMENT (OUTPATIENT)
Dept: CT IMAGING | Facility: HOSPITAL | Age: 43
End: 2018-06-04

## 2018-06-04 ENCOUNTER — APPOINTMENT (OUTPATIENT)
Dept: GENERAL RADIOLOGY | Facility: HOSPITAL | Age: 43
End: 2018-06-04

## 2018-06-04 ENCOUNTER — HOSPITAL ENCOUNTER (EMERGENCY)
Facility: HOSPITAL | Age: 43
Discharge: HOME | End: 2018-06-04
Attending: EMERGENCY MEDICINE | Admitting: EMERGENCY MEDICINE

## 2018-06-04 VITALS
HEIGHT: 69 IN | TEMPERATURE: 98 F | OXYGEN SATURATION: 98 % | WEIGHT: 168 LBS | SYSTOLIC BLOOD PRESSURE: 112 MMHG | HEART RATE: 65 BPM | DIASTOLIC BLOOD PRESSURE: 61 MMHG | BODY MASS INDEX: 24.88 KG/M2 | RESPIRATION RATE: 16 BRPM

## 2018-06-04 DIAGNOSIS — J20.9 ACUTE BRONCHITIS, UNSPECIFIED ORGANISM: ICD-10-CM

## 2018-06-04 DIAGNOSIS — R07.81 PLEURITIC CHEST PAIN: Primary | ICD-10-CM

## 2018-06-04 LAB
ALBUMIN SERPL-MCNC: 4.5 G/DL (ref 3.5–5)
ALBUMIN/GLOB SERPL: 1.7 G/DL (ref 1.5–2.5)
ALP SERPL-CCNC: 77 U/L (ref 35–104)
ALT SERPL W P-5'-P-CCNC: 81 U/L (ref 10–36)
ANION GAP SERPL CALCULATED.3IONS-SCNC: 5.3 MMOL/L (ref 3.6–11.2)
AST SERPL-CCNC: 64 U/L (ref 10–30)
BASOPHILS # BLD AUTO: 0.01 10*3/MM3 (ref 0–0.3)
BASOPHILS NFR BLD AUTO: 0.2 % (ref 0–2)
BILIRUB SERPL-MCNC: 1.1 MG/DL (ref 0.2–1.8)
BUN BLD-MCNC: 7 MG/DL (ref 7–21)
BUN/CREAT SERPL: 10.6 (ref 7–25)
CALCIUM SPEC-SCNC: 9.4 MG/DL (ref 7.7–10)
CHLORIDE SERPL-SCNC: 110 MMOL/L (ref 99–112)
CO2 SERPL-SCNC: 24.7 MMOL/L (ref 24.3–31.9)
CREAT BLD-MCNC: 0.66 MG/DL (ref 0.43–1.29)
D DIMER PPP FEU-MCNC: 1.31 MCGFEU/ML (ref 0–0.5)
DEPRECATED RDW RBC AUTO: 42.8 FL (ref 37–54)
EOSINOPHIL # BLD AUTO: 0.11 10*3/MM3 (ref 0–0.7)
EOSINOPHIL NFR BLD AUTO: 2.5 % (ref 0–5)
ERYTHROCYTE [DISTWIDTH] IN BLOOD BY AUTOMATED COUNT: 14.3 % (ref 11.5–14.5)
GFR SERPL CREATININE-BSD FRML MDRD: 98 ML/MIN/1.73
GLOBULIN UR ELPH-MCNC: 2.7 GM/DL
GLUCOSE BLD-MCNC: 102 MG/DL (ref 70–110)
HCT VFR BLD AUTO: 39.7 % (ref 37–47)
HGB BLD-MCNC: 13.3 G/DL (ref 12–16)
HOLD SPECIMEN: NORMAL
HOLD SPECIMEN: NORMAL
IMM GRANULOCYTES # BLD: 0 10*3/MM3 (ref 0–0.03)
IMM GRANULOCYTES NFR BLD: 0 % (ref 0–0.5)
INR PPP: 1.05 (ref 0.9–1.1)
LYMPHOCYTES # BLD AUTO: 1.49 10*3/MM3 (ref 1–3)
LYMPHOCYTES NFR BLD AUTO: 33.5 % (ref 21–51)
MCH RBC QN AUTO: 27.8 PG (ref 27–33)
MCHC RBC AUTO-ENTMCNC: 33.5 G/DL (ref 33–37)
MCV RBC AUTO: 83.1 FL (ref 80–94)
MONOCYTES # BLD AUTO: 0.39 10*3/MM3 (ref 0.1–0.9)
MONOCYTES NFR BLD AUTO: 8.8 % (ref 0–10)
NEUTROPHILS # BLD AUTO: 2.45 10*3/MM3 (ref 1.4–6.5)
NEUTROPHILS NFR BLD AUTO: 55 % (ref 30–70)
OSMOLALITY SERPL CALC.SUM OF ELEC: 277.6 MOSM/KG (ref 273–305)
PLATELET # BLD AUTO: 148 10*3/MM3 (ref 130–400)
PMV BLD AUTO: 11.2 FL (ref 6–10)
POTASSIUM BLD-SCNC: 3.9 MMOL/L (ref 3.5–5.3)
PROT SERPL-MCNC: 7.2 G/DL (ref 6–8)
PROTHROMBIN TIME: 13.9 SECONDS (ref 11–15.4)
RBC # BLD AUTO: 4.78 10*6/MM3 (ref 4.2–5.4)
SODIUM BLD-SCNC: 140 MMOL/L (ref 135–153)
TROPONIN I SERPL-MCNC: <0.006 NG/ML
WBC NRBC COR # BLD: 4.45 10*3/MM3 (ref 4.5–12.5)
WHOLE BLOOD HOLD SPECIMEN: NORMAL
WHOLE BLOOD HOLD SPECIMEN: NORMAL

## 2018-06-04 PROCEDURE — 71275 CT ANGIOGRAPHY CHEST: CPT

## 2018-06-04 PROCEDURE — 25010000002 DIPHENHYDRAMINE PER 50 MG: Performed by: PHYSICIAN ASSISTANT

## 2018-06-04 PROCEDURE — 80053 COMPREHEN METABOLIC PANEL: CPT | Performed by: PHYSICIAN ASSISTANT

## 2018-06-04 PROCEDURE — 71045 X-RAY EXAM CHEST 1 VIEW: CPT

## 2018-06-04 PROCEDURE — 93005 ELECTROCARDIOGRAM TRACING: CPT | Performed by: PHYSICIAN ASSISTANT

## 2018-06-04 PROCEDURE — 85025 COMPLETE CBC W/AUTO DIFF WBC: CPT | Performed by: PHYSICIAN ASSISTANT

## 2018-06-04 PROCEDURE — 36415 COLL VENOUS BLD VENIPUNCTURE: CPT

## 2018-06-04 PROCEDURE — 71275 CT ANGIOGRAPHY CHEST: CPT | Performed by: RADIOLOGY

## 2018-06-04 PROCEDURE — 96374 THER/PROPH/DIAG INJ IV PUSH: CPT

## 2018-06-04 PROCEDURE — 85610 PROTHROMBIN TIME: CPT | Performed by: PHYSICIAN ASSISTANT

## 2018-06-04 PROCEDURE — 96375 TX/PRO/DX INJ NEW DRUG ADDON: CPT

## 2018-06-04 PROCEDURE — 0 IOPAMIDOL PER 1 ML: Performed by: EMERGENCY MEDICINE

## 2018-06-04 PROCEDURE — 85379 FIBRIN DEGRADATION QUANT: CPT | Performed by: PHYSICIAN ASSISTANT

## 2018-06-04 PROCEDURE — 84484 ASSAY OF TROPONIN QUANT: CPT | Performed by: PHYSICIAN ASSISTANT

## 2018-06-04 PROCEDURE — 71045 X-RAY EXAM CHEST 1 VIEW: CPT | Performed by: RADIOLOGY

## 2018-06-04 PROCEDURE — 25010000002 DEXAMETHASONE PER 1 MG: Performed by: PHYSICIAN ASSISTANT

## 2018-06-04 PROCEDURE — 25010000002 HEPARIN FLUSH (PORCINE) 100 UNIT/ML SOLUTION: Performed by: EMERGENCY MEDICINE

## 2018-06-04 PROCEDURE — 25010000002 PROMETHAZINE PER 50 MG: Performed by: EMERGENCY MEDICINE

## 2018-06-04 PROCEDURE — 93010 ELECTROCARDIOGRAM REPORT: CPT | Performed by: INTERNAL MEDICINE

## 2018-06-04 PROCEDURE — 99284 EMERGENCY DEPT VISIT MOD MDM: CPT

## 2018-06-04 PROCEDURE — 25010000002 MORPHINE PER 10 MG: Performed by: EMERGENCY MEDICINE

## 2018-06-04 RX ORDER — SODIUM CHLORIDE 0.9 % (FLUSH) 0.9 %
10 SYRINGE (ML) INJECTION AS NEEDED
Status: DISCONTINUED | OUTPATIENT
Start: 2018-06-04 | End: 2018-06-04 | Stop reason: HOSPADM

## 2018-06-04 RX ORDER — DIPHENHYDRAMINE HYDROCHLORIDE 50 MG/ML
25 INJECTION INTRAMUSCULAR; INTRAVENOUS ONCE
Status: COMPLETED | OUTPATIENT
Start: 2018-06-04 | End: 2018-06-04

## 2018-06-04 RX ORDER — TOPIRAMATE 25 MG/1
25 CAPSULE, COATED PELLETS ORAL 3 TIMES DAILY
COMMUNITY
End: 2018-06-09 | Stop reason: HOSPADM

## 2018-06-04 RX ORDER — DOXYCYCLINE 100 MG/1
100 CAPSULE ORAL 2 TIMES DAILY
Qty: 20 CAPSULE | Refills: 0 | Status: SHIPPED | OUTPATIENT
Start: 2018-06-04 | End: 2018-06-09 | Stop reason: HOSPADM

## 2018-06-04 RX ORDER — PROMETHAZINE HYDROCHLORIDE 25 MG/ML
12.5 INJECTION, SOLUTION INTRAMUSCULAR; INTRAVENOUS ONCE
Status: COMPLETED | OUTPATIENT
Start: 2018-06-04 | End: 2018-06-04

## 2018-06-04 RX ADMIN — SODIUM CHLORIDE, PRESERVATIVE FREE 300 UNITS: 5 INJECTION INTRAVENOUS at 15:31

## 2018-06-04 RX ADMIN — Medication 10 ML: at 15:28

## 2018-06-04 RX ADMIN — MORPHINE SULFATE 4 MG: 4 INJECTION, SOLUTION INTRAMUSCULAR; INTRAVENOUS at 13:35

## 2018-06-04 RX ADMIN — IOPAMIDOL 100 ML: 755 INJECTION, SOLUTION INTRAVENOUS at 14:08

## 2018-06-04 RX ADMIN — PROMETHAZINE HYDROCHLORIDE 12.5 MG: 25 INJECTION INTRAMUSCULAR; INTRAVENOUS at 13:26

## 2018-06-04 RX ADMIN — DEXAMETHASONE SODIUM PHOSPHATE 10 MG: 4 INJECTION, SOLUTION INTRAMUSCULAR; INTRAVENOUS at 15:13

## 2018-06-04 RX ADMIN — DIPHENHYDRAMINE HYDROCHLORIDE 25 MG: 50 INJECTION INTRAMUSCULAR; INTRAVENOUS at 15:06

## 2018-06-04 NOTE — ED PROVIDER NOTES
Subjective     Chest Pain   Pain location:  L chest  Pain quality: aching, sharp and tightness    Pain radiates to:  Does not radiate  Pain severity:  Moderate  Onset quality:  Gradual  Duration:  24 hours  Timing:  Constant  Progression:  Unchanged  Chronicity:  New  Context comment:  She reports feeling like she has something squeezing around her heart.  She states that she has had productive cough of greenish sputum.  Her pain does seem to increase on her left side when she coughs.  Nitroglycerin did not help.  Relieved by:  Nothing  Worsened by:  Coughing and deep breathing  Associated symptoms: nausea and shortness of breath    Associated symptoms: no abdominal pain, no cough, no fever and no vomiting        Review of Systems   Constitutional: Negative.  Negative for fever.   HENT: Negative.    Respiratory: Positive for shortness of breath. Negative for cough.    Cardiovascular: Positive for chest pain.   Gastrointestinal: Positive for nausea. Negative for abdominal pain and vomiting.   Endocrine: Negative.    Genitourinary: Negative.  Negative for dysuria.   Skin: Negative.    Neurological: Negative.    Psychiatric/Behavioral: Negative.    All other systems reviewed and are negative.      Past Medical History:   Diagnosis Date   • Abdominal pain    • Abdominal swelling    • Hoyos's disease    • Bipolar 1 disorder    • Brain tumor     R Frontal Lobe per pt   • Brain tumor 2014   • Brain tumor (benign)    • Constipation    • DDD (degenerative disc disease), cervical 05/29/2017   • DDD (degenerative disc disease), cervical    • Diarrhea    • Fibromyalgia    • IBS (irritable bowel syndrome)    • Migraine    • Nausea & vomiting    • PONV (postoperative nausea and vomiting)    • PTSD (post-traumatic stress disorder)    • Rectal bleeding        Allergies   Allergen Reactions   • Ativan [Lorazepam] Hallucinations     confusion   • Sulfa Antibiotics Shortness Of Breath and Swelling   • Compazine [Prochlorperazine  Edisylate] Hives   • Demerol [Meperidine] Hives   • Droperidol Itching   • Metoclopramide Swelling   • Toradol [Ketorolac Tromethamine] Hives and Itching   • Zofran [Ondansetron Hcl] Rash       Past Surgical History:   Procedure Laterality Date   • ANAL SCOPE N/A 7/28/2016    Procedure: ANAL SCOPE;  Surgeon: Kael Lopez MD;  Location: [facility] OR;  Service:    • APPENDECTOMY     • COLONOSCOPY N/A 6/30/2016    Procedure: COLONOSCOPY  CPTCODE:03167;  Surgeon: Jose Antonio Belle III, MD;  Location: [facility] OR;  Service:    • COLONOSCOPY N/A 7/7/2016    Procedure: COLONOSCOPY (57666) CPT;  Surgeon: Jose Antonio Belle III, MD;  Location: [facility] OR;  Service:    • CYSTOSCOPY RETROGRADE PYELOGRAM Bilateral 4/28/2017    Procedure: CYSTOSCOPY RETROGRADE PYELOGRAM;  Surgeon: Mu Blunt MD;  Location: [facility] OR;  Service:    • ENDOSCOPY N/A 6/30/2016    Procedure: ESOPHAGOGASTRODUODENOSCOPY WITH BIOPSY  CPTCODE:86381;  Surgeon: Jose Antonio Belle III, MD;  Location: [facility] OR;  Service:    • HEMORRHOIDECTOMY N/A 7/28/2016    Procedure: HEMORRHOID STAPLING;  Surgeon: Kael Lopez MD;  Location: [facility] OR;  Service:    • HYSTERECTOMY     • KNEE SURGERY     • LAPAROSCOPIC SALPINGOOPHERECTOMY     • PORTACATH PLACEMENT N/A 7/28/2017    Procedure: INSERTION OF PORTACATH;  Surgeon: Celso Arredondo MD;  Location: [facility] OR;  Service:    • SHOULDER SURGERY      3 times       Family History   Problem Relation Age of Onset   • Crohn's disease Other    • Hypertension Other    • Diabetes Other    • Irritable bowel syndrome Other        Social History     Social History   • Marital status:      Social History Main Topics   • Smoking status: Former Smoker     Packs/day: 1.00     Years: 20.00     Quit date: 11/1/2015   • Smokeless tobacco: Never Used   • Alcohol use No   • Drug use: Yes     Types: Marijuana      Comment: for pain   • Sexual activity: Defer     Other Topics Concern   • Not on file            Objective   Physical Exam   Constitutional: She is oriented to person, place, and time. She appears well-developed and well-nourished. No distress.   HENT:   Head: Normocephalic and atraumatic.   Right Ear: External ear normal.   Left Ear: External ear normal.   Nose: Nose normal.   Eyes: Conjunctivae and EOM are normal. Pupils are equal, round, and reactive to light.   Neck: Normal range of motion. Neck supple. No JVD present. No tracheal deviation present.   Cardiovascular: Normal rate, regular rhythm and normal heart sounds.    No murmur heard.  Pulmonary/Chest: Effort normal and breath sounds normal. No respiratory distress. She has no wheezes.   Abdominal: Soft. Bowel sounds are normal. There is no tenderness.   Musculoskeletal: Normal range of motion. She exhibits no edema or deformity.   Neurological: She is alert and oriented to person, place, and time. No cranial nerve deficit.   Skin: Skin is warm and dry. No rash noted. She is not diaphoretic. No erythema. No pallor.   Psychiatric: She has a normal mood and affect. Her behavior is normal. Thought content normal.   Nursing note and vitals reviewed.      Procedures           ED Course  ED Course as of Jun 04 1550   Mon Jun 04, 2018   1442 Disposition patient states that she felt like a migraine was coming on.  We will treat her with Benadryl and dexamethasone.  She states this usually helps.  Follow up with her primary care in regards to her headache if it persisted.  [JI]      ED Course User Index  [JI] TAMI Penn                  MDM  Number of Diagnoses or Management Options  Acute bronchitis, unspecified organism: new and requires workup  Pleuritic chest pain: new and requires workup     Amount and/or Complexity of Data Reviewed  Clinical lab tests: reviewed and ordered  Tests in the radiology section of CPT®: reviewed and ordered  Tests in the medicine section of CPT®: reviewed and ordered  Decide to obtain previous medical records or  to obtain history from someone other than the patient: yes  Discuss the patient with other providers: yes    Risk of Complications, Morbidity, and/or Mortality  Presenting problems: moderate          Final diagnoses:   Pleuritic chest pain   Acute bronchitis, unspecified organism            TAMI Penn  06/04/18 9485

## 2018-06-04 NOTE — ED NOTES
Port accessed that was in place to right subclavicle area with sterile technique, per protocol x1 stick, pt tolerated well, good blood return and flushed without difficulty     Melva Garrison RN  06/04/18 1185

## 2018-06-04 NOTE — ED NOTES
Pharmacy had been contacted over decadron order and has to be infusion, provider was made aware and pt, explained then would dc pt, pt verb. understanding     Melva Garrison RN  06/04/18 5844

## 2018-06-05 ENCOUNTER — APPOINTMENT (OUTPATIENT)
Dept: GENERAL RADIOLOGY | Facility: HOSPITAL | Age: 43
End: 2018-06-05

## 2018-06-05 ENCOUNTER — HOSPITAL ENCOUNTER (INPATIENT)
Facility: HOSPITAL | Age: 43
LOS: 4 days | Discharge: HOME | End: 2018-06-09
Attending: EMERGENCY MEDICINE | Admitting: PSYCHIATRY & NEUROLOGY

## 2018-06-05 DIAGNOSIS — R07.2 PRECORDIAL PAIN: Primary | ICD-10-CM

## 2018-06-05 DIAGNOSIS — R51.9 HEADACHE, UNSPECIFIED HEADACHE TYPE: ICD-10-CM

## 2018-06-05 PROBLEM — F31.9 BIPOLAR 1 DISORDER: Status: ACTIVE | Noted: 2018-06-05

## 2018-06-05 PROBLEM — K50.90 CROHN DISEASE: Status: ACTIVE | Noted: 2018-06-05

## 2018-06-05 LAB
ALBUMIN SERPL-MCNC: 4.46 G/DL (ref 3.2–4.8)
ALBUMIN/GLOB SERPL: 1.5 G/DL (ref 1.5–2.5)
ALP SERPL-CCNC: 77 U/L (ref 25–100)
ALT SERPL W P-5'-P-CCNC: 73 U/L (ref 7–40)
ANION GAP SERPL CALCULATED.3IONS-SCNC: 5 MMOL/L (ref 3–11)
AST SERPL-CCNC: 42 U/L (ref 0–33)
BASOPHILS # BLD AUTO: 0.01 10*3/MM3 (ref 0–0.2)
BASOPHILS NFR BLD AUTO: 0.1 % (ref 0–1)
BILIRUB SERPL-MCNC: 0.5 MG/DL (ref 0.3–1.2)
BILIRUB UR QL STRIP: NEGATIVE
BNP SERPL-MCNC: 27 PG/ML (ref 0–100)
BUN BLD-MCNC: 6 MG/DL (ref 9–23)
BUN/CREAT SERPL: 9.1 (ref 7–25)
CALCIUM SPEC-SCNC: 9.7 MG/DL (ref 8.7–10.4)
CHLORIDE SERPL-SCNC: 115 MMOL/L (ref 99–109)
CLARITY UR: CLEAR
CO2 SERPL-SCNC: 23 MMOL/L (ref 20–31)
COLOR UR: YELLOW
CREAT BLD-MCNC: 0.66 MG/DL (ref 0.6–1.3)
DEPRECATED RDW RBC AUTO: 44.3 FL (ref 37–54)
EOSINOPHIL # BLD AUTO: 0.04 10*3/MM3 (ref 0–0.3)
EOSINOPHIL NFR BLD AUTO: 0.4 % (ref 0–3)
ERYTHROCYTE [DISTWIDTH] IN BLOOD BY AUTOMATED COUNT: 14.4 % (ref 11.3–14.5)
GFR SERPL CREATININE-BSD FRML MDRD: 98 ML/MIN/1.73
GLOBULIN UR ELPH-MCNC: 2.9 GM/DL
GLUCOSE BLD-MCNC: 88 MG/DL (ref 70–100)
GLUCOSE UR STRIP-MCNC: NEGATIVE MG/DL
HCT VFR BLD AUTO: 36.9 % (ref 34.5–44)
HGB BLD-MCNC: 11.9 G/DL (ref 11.5–15.5)
HGB UR QL STRIP.AUTO: NEGATIVE
HOLD SPECIMEN: NORMAL
HOLD SPECIMEN: NORMAL
IMM GRANULOCYTES # BLD: 0.02 10*3/MM3 (ref 0–0.03)
IMM GRANULOCYTES NFR BLD: 0.2 % (ref 0–0.6)
KETONES UR QL STRIP: NEGATIVE
LEUKOCYTE ESTERASE UR QL STRIP.AUTO: NEGATIVE
LIPASE SERPL-CCNC: 67 U/L (ref 6–51)
LYMPHOCYTES # BLD AUTO: 3.08 10*3/MM3 (ref 0.6–4.8)
LYMPHOCYTES NFR BLD AUTO: 28.9 % (ref 24–44)
MAGNESIUM SERPL-MCNC: 2 MG/DL (ref 1.3–2.7)
MCH RBC QN AUTO: 27 PG (ref 27–31)
MCHC RBC AUTO-ENTMCNC: 32.2 G/DL (ref 32–36)
MCV RBC AUTO: 83.7 FL (ref 80–99)
MONOCYTES # BLD AUTO: 0.86 10*3/MM3 (ref 0–1)
MONOCYTES NFR BLD AUTO: 8.1 % (ref 0–12)
NEUTROPHILS # BLD AUTO: 6.68 10*3/MM3 (ref 1.5–8.3)
NEUTROPHILS NFR BLD AUTO: 62.5 % (ref 41–71)
NITRITE UR QL STRIP: NEGATIVE
PH UR STRIP.AUTO: 5.5 [PH] (ref 5–8)
PLATELET # BLD AUTO: 150 10*3/MM3 (ref 150–450)
PMV BLD AUTO: 11.3 FL (ref 6–12)
POTASSIUM BLD-SCNC: 3.6 MMOL/L (ref 3.5–5.5)
PROT SERPL-MCNC: 7.4 G/DL (ref 5.7–8.2)
PROT UR QL STRIP: NEGATIVE
RBC # BLD AUTO: 4.41 10*6/MM3 (ref 3.89–5.14)
SODIUM BLD-SCNC: 143 MMOL/L (ref 132–146)
SP GR UR STRIP: 1.02 (ref 1–1.03)
T4 FREE SERPL-MCNC: 1.09 NG/DL (ref 0.89–1.76)
TROPONIN I SERPL-MCNC: <0.006 NG/ML
TSH SERPL DL<=0.05 MIU/L-ACNC: 0.35 MIU/ML (ref 0.35–5.35)
UROBILINOGEN UR QL STRIP: NORMAL
WBC NRBC COR # BLD: 10.67 10*3/MM3 (ref 3.5–10.8)
WHOLE BLOOD HOLD SPECIMEN: NORMAL
WHOLE BLOOD HOLD SPECIMEN: NORMAL

## 2018-06-05 PROCEDURE — 25010000002 PROMETHAZINE PER 50 MG: Performed by: EMERGENCY MEDICINE

## 2018-06-05 PROCEDURE — 83735 ASSAY OF MAGNESIUM: CPT | Performed by: EMERGENCY MEDICINE

## 2018-06-05 PROCEDURE — 71045 X-RAY EXAM CHEST 1 VIEW: CPT

## 2018-06-05 PROCEDURE — 80053 COMPREHEN METABOLIC PANEL: CPT | Performed by: EMERGENCY MEDICINE

## 2018-06-05 PROCEDURE — G0378 HOSPITAL OBSERVATION PER HR: HCPCS

## 2018-06-05 PROCEDURE — 84443 ASSAY THYROID STIM HORMONE: CPT | Performed by: EMERGENCY MEDICINE

## 2018-06-05 PROCEDURE — 85025 COMPLETE CBC W/AUTO DIFF WBC: CPT | Performed by: EMERGENCY MEDICINE

## 2018-06-05 PROCEDURE — 93005 ELECTROCARDIOGRAM TRACING: CPT

## 2018-06-05 PROCEDURE — 25010000002 BUTORPHANOL PER 1 MG: Performed by: HOSPITALIST

## 2018-06-05 PROCEDURE — 84439 ASSAY OF FREE THYROXINE: CPT | Performed by: EMERGENCY MEDICINE

## 2018-06-05 PROCEDURE — 99223 1ST HOSP IP/OBS HIGH 75: CPT | Performed by: HOSPITALIST

## 2018-06-05 PROCEDURE — 83880 ASSAY OF NATRIURETIC PEPTIDE: CPT | Performed by: EMERGENCY MEDICINE

## 2018-06-05 PROCEDURE — 25010000002 DIPHENHYDRAMINE PER 50 MG: Performed by: EMERGENCY MEDICINE

## 2018-06-05 PROCEDURE — 83690 ASSAY OF LIPASE: CPT | Performed by: EMERGENCY MEDICINE

## 2018-06-05 PROCEDURE — 25010000002 DEXAMETHASONE: Performed by: EMERGENCY MEDICINE

## 2018-06-05 PROCEDURE — 63710000001 PROMETHAZINE PER 25 MG: Performed by: NURSE PRACTITIONER

## 2018-06-05 PROCEDURE — 25010000002 BUTORPHANOL PER 1 MG: Performed by: EMERGENCY MEDICINE

## 2018-06-05 PROCEDURE — 93005 ELECTROCARDIOGRAM TRACING: CPT | Performed by: EMERGENCY MEDICINE

## 2018-06-05 PROCEDURE — 99254 IP/OBS CNSLTJ NEW/EST MOD 60: CPT | Performed by: INTERNAL MEDICINE

## 2018-06-05 PROCEDURE — 84484 ASSAY OF TROPONIN QUANT: CPT | Performed by: EMERGENCY MEDICINE

## 2018-06-05 PROCEDURE — 99284 EMERGENCY DEPT VISIT MOD MDM: CPT

## 2018-06-05 PROCEDURE — 81003 URINALYSIS AUTO W/O SCOPE: CPT | Performed by: EMERGENCY MEDICINE

## 2018-06-05 RX ORDER — DIVALPROEX SODIUM 250 MG/1
250 TABLET, DELAYED RELEASE ORAL 3 TIMES DAILY
Status: DISCONTINUED | OUTPATIENT
Start: 2018-06-05 | End: 2018-06-06

## 2018-06-05 RX ORDER — DICYCLOMINE HYDROCHLORIDE 10 MG/1
10 CAPSULE ORAL 3 TIMES DAILY
COMMUNITY
End: 2019-01-11

## 2018-06-05 RX ORDER — SODIUM CHLORIDE 0.9 % (FLUSH) 0.9 %
1-10 SYRINGE (ML) INJECTION AS NEEDED
Status: DISCONTINUED | OUTPATIENT
Start: 2018-06-05 | End: 2018-06-09 | Stop reason: HOSPADM

## 2018-06-05 RX ORDER — ACETAMINOPHEN 325 MG/1
650 TABLET ORAL EVERY 4 HOURS PRN
Status: DISCONTINUED | OUTPATIENT
Start: 2018-06-05 | End: 2018-06-09 | Stop reason: HOSPADM

## 2018-06-05 RX ORDER — BUTORPHANOL TARTRATE 1 MG/ML
1 INJECTION, SOLUTION INTRAMUSCULAR; INTRAVENOUS ONCE
Status: COMPLETED | OUTPATIENT
Start: 2018-06-05 | End: 2018-06-05

## 2018-06-05 RX ORDER — PROMETHAZINE HYDROCHLORIDE 25 MG/1
25 TABLET ORAL EVERY 6 HOURS PRN
Status: DISCONTINUED | OUTPATIENT
Start: 2018-06-05 | End: 2018-06-06

## 2018-06-05 RX ORDER — PROMETHAZINE HYDROCHLORIDE 25 MG/ML
12.5 INJECTION, SOLUTION INTRAMUSCULAR; INTRAVENOUS ONCE
Status: COMPLETED | OUTPATIENT
Start: 2018-06-05 | End: 2018-06-05

## 2018-06-05 RX ORDER — TOPIRAMATE 25 MG/1
25 CAPSULE, COATED PELLETS ORAL EVERY 12 HOURS SCHEDULED
Status: DISCONTINUED | OUTPATIENT
Start: 2018-06-05 | End: 2018-06-06

## 2018-06-05 RX ORDER — ASPIRIN 81 MG/1
324 TABLET, CHEWABLE ORAL ONCE
Status: DISCONTINUED | OUTPATIENT
Start: 2018-06-05 | End: 2018-06-09 | Stop reason: HOSPADM

## 2018-06-05 RX ORDER — PANTOPRAZOLE SODIUM 40 MG/1
40 TABLET, DELAYED RELEASE ORAL
Status: DISCONTINUED | OUTPATIENT
Start: 2018-06-06 | End: 2018-06-09 | Stop reason: HOSPADM

## 2018-06-05 RX ORDER — SODIUM CHLORIDE 9 MG/ML
75 INJECTION, SOLUTION INTRAVENOUS CONTINUOUS
Status: ACTIVE | OUTPATIENT
Start: 2018-06-05 | End: 2018-06-06

## 2018-06-05 RX ORDER — SERTRALINE HYDROCHLORIDE 100 MG/1
100 TABLET, FILM COATED ORAL 2 TIMES DAILY
Status: DISCONTINUED | OUTPATIENT
Start: 2018-06-05 | End: 2018-06-09 | Stop reason: HOSPADM

## 2018-06-05 RX ORDER — ALUMINA, MAGNESIA, AND SIMETHICONE 2400; 2400; 240 MG/30ML; MG/30ML; MG/30ML
15 SUSPENSION ORAL EVERY 6 HOURS PRN
Status: DISCONTINUED | OUTPATIENT
Start: 2018-06-05 | End: 2018-06-09 | Stop reason: HOSPADM

## 2018-06-05 RX ORDER — MONTELUKAST SODIUM 10 MG/1
10 TABLET ORAL NIGHTLY
COMMUNITY

## 2018-06-05 RX ORDER — DIPHENHYDRAMINE HYDROCHLORIDE 50 MG/ML
25 INJECTION INTRAMUSCULAR; INTRAVENOUS ONCE
Status: COMPLETED | OUTPATIENT
Start: 2018-06-05 | End: 2018-06-05

## 2018-06-05 RX ORDER — BUSPIRONE HYDROCHLORIDE 15 MG/1
15 TABLET ORAL 3 TIMES DAILY
Status: DISCONTINUED | OUTPATIENT
Start: 2018-06-05 | End: 2018-06-09 | Stop reason: HOSPADM

## 2018-06-05 RX ORDER — SODIUM CHLORIDE 0.9 % (FLUSH) 0.9 %
10 SYRINGE (ML) INJECTION AS NEEDED
Status: DISCONTINUED | OUTPATIENT
Start: 2018-06-05 | End: 2018-06-09 | Stop reason: HOSPADM

## 2018-06-05 RX ADMIN — PROMETHAZINE HYDROCHLORIDE 12.5 MG: 25 INJECTION INTRAMUSCULAR; INTRAVENOUS at 16:38

## 2018-06-05 RX ADMIN — SODIUM CHLORIDE 75 ML/HR: 9 INJECTION, SOLUTION INTRAVENOUS at 20:31

## 2018-06-05 RX ADMIN — DIVALPROEX SODIUM 250 MG: 250 TABLET, DELAYED RELEASE ORAL at 23:01

## 2018-06-05 RX ADMIN — BUSPIRONE HYDROCHLORIDE 15 MG: 15 TABLET ORAL at 23:01

## 2018-06-05 RX ADMIN — BUTORPHANOL TARTRATE 1 MG: 1 INJECTION, SOLUTION INTRAMUSCULAR; INTRAVENOUS at 19:32

## 2018-06-05 RX ADMIN — BUTORPHANOL TARTRATE 1 MG: 2 INJECTION, SOLUTION INTRAMUSCULAR; INTRAVENOUS at 17:02

## 2018-06-05 RX ADMIN — SERTRALINE HYDROCHLORIDE 100 MG: 100 TABLET ORAL at 23:01

## 2018-06-05 RX ADMIN — DIPHENHYDRAMINE HYDROCHLORIDE 25 MG: 50 INJECTION INTRAMUSCULAR; INTRAVENOUS at 16:40

## 2018-06-05 RX ADMIN — DEXAMETHASONE SODIUM PHOSPHATE 10 MG: 10 INJECTION INTRAMUSCULAR; INTRAVENOUS at 16:36

## 2018-06-05 RX ADMIN — TOPIRAMATE 25 MG: 25 CAPSULE, COATED PELLETS ORAL at 23:01

## 2018-06-05 RX ADMIN — PROMETHAZINE HYDROCHLORIDE 25 MG: 25 TABLET ORAL at 23:01

## 2018-06-05 NOTE — PROGRESS NOTES
Discharge Planning Assessment  [facility]     Patient Name: Susanna Camilo  MRN: [id number]  Today's Date: 6/5/2018    Admit Date: 6/5/2018          Discharge Needs Assessment     Row Name 06/05/18 1706       Living Environment    Lives With spouse    Current Living Arrangements home/apartment/condo    Primary Care Provided by self    Provides Primary Care For no one    Family Caregiver if Needed spouse    Quality of Family Relationships unable to assess    Able to Return to Prior Arrangements yes       Resource/Environmental Concerns    Resource/Environmental Concerns none       Transition Planning    Patient/Family Anticipates Transition to home with family    Transportation Anticipated family or friend will provide       Discharge Needs Assessment    Readmission Within the Last 30 Days no previous admission in last 30 days    Concerns to be Addressed denies needs/concerns at this time    Equipment Currently Used at Home none    Anticipated Changes Related to Illness none    Offered/Gave Vendor List no            Discharge Plan     Row Name 06/05/18 1707       Plan    Plan initial    Plan Comments Pt lives in [place] with her spouse. She works part-time, is independent with no DME or O2. PCP is Mabel Patterson    Final Discharge Disposition Code 01 - home or self-care        Destination     No service coordination in this encounter.      Durable Medical Equipment     No service coordination in this encounter.      Dialysis/Infusion     No service coordination in this encounter.      Home Medical Care     No service coordination in this encounter.      Social Care     No service coordination in this encounter.                Demographic Summary    No documentation.           Functional Status     Row Name 06/05/18 1706       Functional Status    Usual Activity Tolerance good    Current Activity Tolerance good       Functional Status, IADL    Medications independent    Meal Preparation independent     Housekeeping independent    Laundry independent    Shopping independent       Mental Status    General Appearance WDL WDL       Mental Status Summary    Recent Changes in Mental Status/Cognitive Functioning no changes       Employment/    Employment Status employed part time    Current or Previous Occupation service industry            Psychosocial    No documentation.           Abuse/Neglect    No documentation.           Legal    No documentation.           Substance Abuse    No documentation.           Patient Forms    No documentation.         Kaylene Torres

## 2018-06-05 NOTE — ED PROVIDER NOTES
Hui Camilo is a 43 y.o. female who presents to the ED with c/o left chest pain with onset yesterday. The pain is described as a constant pressure and heaviness that has been worsening since onset, and worsens with exertion. It is currently a 7/10 in severity and radiates into her left arm. Prior to arrival, she tried to relieve the pain using 3 NTG with mild relief. She also notes of associated palpitations, nausea, headache, and diaphoresis and states that that has been a persistent problem for the past 5 months. The patient has been seen at [facility] in [place] multiple times for this problem, and was last seen there last night shortly after onset. She denies urinary sx, fever, chills, or any other acute complaints at this time. The patient has a hx of smoking and a family hx of cardiac related problems from her other who  of an MI at 63.        History provided by:  Patient  Chest Pain   Pain location:  L chest  Pain quality: pressure    Pain radiates to:  L arm  Pain severity:  Moderate (7/10)  Onset quality:  Sudden  Duration: Onset yesterday.  Timing:  Constant  Progression:  Worsening  Chronicity:  Recurrent  Relieved by:  Nitroglycerin  Worsened by:  Nothing  Ineffective treatments:  None tried  Associated symptoms: headache, nausea and palpitations    Associated symptoms: no fever    Risk factors: smoking        Review of Systems   Constitutional: Negative for chills and fever.   Cardiovascular: Positive for chest pain and palpitations.   Gastrointestinal: Positive for nausea.   Genitourinary: Negative for decreased urine volume, difficulty urinating, dysuria, frequency, hematuria and urgency.   Neurological: Positive for headaches.   All other systems reviewed and are negative.      Past Medical History:   Diagnosis Date   • Abdominal pain    • Abdominal swelling    • Hoyos's disease    • Bipolar 1 disorder    • Brain tumor     R Frontal Lobe per pt   • Brain tumor    • Brain  tumor (benign)    • Constipation    • DDD (degenerative disc disease), cervical 05/29/2017   • DDD (degenerative disc disease), cervical    • Diarrhea    • Fibromyalgia    • IBS (irritable bowel syndrome)    • Migraine    • Nausea & vomiting    • PONV (postoperative nausea and vomiting)    • PTSD (post-traumatic stress disorder)    • Rectal bleeding        Allergies   Allergen Reactions   • Ativan [Lorazepam] Hallucinations     confusion   • Sulfa Antibiotics Shortness Of Breath and Swelling   • Compazine [Prochlorperazine Edisylate] Hives   • Demerol [Meperidine] Hives   • Droperidol Itching   • Metoclopramide Swelling   • Toradol [Ketorolac Tromethamine] Hives and Itching   • Zofran [Ondansetron Hcl] Rash       Past Surgical History:   Procedure Laterality Date   • ANAL SCOPE N/A 7/28/2016    Procedure: ANAL SCOPE;  Surgeon: Kael Lopez MD;  Location: [facility] OR;  Service:    • APPENDECTOMY     • COLONOSCOPY N/A 6/30/2016    Procedure: COLONOSCOPY  CPTCODE:62539;  Surgeon: Jose Antonio Belle III, MD;  Location: [facility] OR;  Service:    • COLONOSCOPY N/A 7/7/2016    Procedure: COLONOSCOPY (70613) CPT;  Surgeon: Jose Antonio Belle III, MD;  Location: [facility] OR;  Service:    • CYSTOSCOPY RETROGRADE PYELOGRAM Bilateral 4/28/2017    Procedure: CYSTOSCOPY RETROGRADE PYELOGRAM;  Surgeon: Mu Blunt MD;  Location: [facility] OR;  Service:    • ENDOSCOPY N/A 6/30/2016    Procedure: ESOPHAGOGASTRODUODENOSCOPY WITH BIOPSY  CPTCODE:81365;  Surgeon: Jose Antonio Belle III, MD;  Location: [facility] OR;  Service:    • HEMORRHOIDECTOMY N/A 7/28/2016    Procedure: HEMORRHOID STAPLING;  Surgeon: Kael Lopez MD;  Location: [facility] OR;  Service:    • HYSTERECTOMY     • KNEE SURGERY     • LAPAROSCOPIC SALPINGOOPHERECTOMY     • PORTACATH PLACEMENT N/A 7/28/2017    Procedure: INSERTION OF PORTACATH;  Surgeon: Celso Arerdondo MD;  Location: [facility] OR;  Service:    • SHOULDER SURGERY      3 times       Family History    Problem Relation Age of Onset   • Crohn's disease Other    • Hypertension Other    • Diabetes Other    • Irritable bowel syndrome Other        Social History     Social History   • Marital status:      Social History Main Topics   • Smoking status: Former Smoker     Packs/day: 1.00     Years: 20.00     Quit date: 11/1/2015   • Smokeless tobacco: Never Used   • Alcohol use No   • Drug use: Yes     Types: Marijuana      Comment: for pain   • Sexual activity: Defer     Other Topics Concern   • Not on file     Social History Narrative    Lives in [place], works as a , independent of ADL         Objective   Physical Exam   Constitutional: She is oriented to person, place, and time. She appears well-developed and well-nourished. No distress.   HENT:   Head: Normocephalic and atraumatic.   Nose: Nose normal.   Mouth/Throat: Oropharynx is clear and moist.   Eyes: Conjunctivae are normal. Pupils are equal, round, and reactive to light. No scleral icterus.   Neck: Normal range of motion. Neck supple.   Cardiovascular: Normal rate, regular rhythm, normal heart sounds and intact distal pulses.    No murmur heard.  Pulmonary/Chest: Effort normal and breath sounds normal. No respiratory distress. She exhibits no tenderness.   Abdominal: Soft. Bowel sounds are normal. There is no tenderness.   Musculoskeletal: Normal range of motion. She exhibits no edema.   Neurological: She is alert and oriented to person, place, and time.   Skin: Skin is warm and dry.   Psychiatric: She has a normal mood and affect. Her behavior is normal.   Nursing note and vitals reviewed.      Procedures         ED Course  ED Course as of Jun 05 2016   Tue Jun 05, 2018   1612 Dr. Bran consulted with cardiology who will see the patient in the ED  [DT]   1615 Patient's old records.  She was in [place] yesterday as stated.  Her progress notes report that Benadryl and Decadron typically help her migraines.  The patient has confirmed this and I will  review this.  She reports that she is allergic to Reglan and Zofran but responds well to Phenergan.  I have ordered her Phenergan as well.  I discussed cardiology consult and I have already discussed the consult with cardiology in view of the patient's risk factors, symptoms, and presentation.  [HH]   1620 Dr. Bran discussed the patient with Dr. Kee, cardiologist.  [DT]   1639 Patient seen and evaluated in the emergency department by Dr. Kee, cardiology.  He is asked the hospitalist to admit the patient for her non-cardiology issues, and will consult for cardiovascular care and chest pain.  [HH]   1651 Dr. Brna discussed with Dr. Mejias, hospitalist, who will admit the patient  [DT]      ED Course User Index  [DT] Jimbo Mejia  [HH] Zachary Bran MD       Recent Results (from the past 24 hour(s))   Comprehensive Metabolic Panel    Collection Time: 06/05/18  3:15 PM   Result Value Ref Range    Glucose 88 70 - 100 mg/dL    BUN 6 (L) 9 - 23 mg/dL    Creatinine 0.66 0.60 - 1.30 mg/dL    Sodium 143 132 - 146 mmol/L    Potassium 3.6 3.5 - 5.5 mmol/L    Chloride 115 (H) 99 - 109 mmol/L    CO2 23.0 20.0 - 31.0 mmol/L    Calcium 9.7 8.7 - 10.4 mg/dL    Total Protein 7.4 5.7 - 8.2 g/dL    Albumin 4.46 3.20 - 4.80 g/dL    ALT (SGPT) 73 (H) 7 - 40 U/L    AST (SGOT) 42 (H) 0 - 33 U/L    Alkaline Phosphatase 77 25 - 100 U/L    Total Bilirubin 0.5 0.3 - 1.2 mg/dL    eGFR Non African Amer 98 >60 mL/min/1.73    Globulin 2.9 gm/dL    A/G Ratio 1.5 1.5 - 2.5 g/dL    BUN/Creatinine Ratio 9.1 7.0 - 25.0    Anion Gap 5.0 3.0 - 11.0 mmol/L   Lipase    Collection Time: 06/05/18  3:15 PM   Result Value Ref Range    Lipase 67 (H) 6 - 51 U/L   Green Top (Gel)    Collection Time: 06/05/18  3:15 PM   Result Value Ref Range    Extra Tube Hold for add-ons.    Troponin    Collection Time: 06/05/18  3:15 PM   Result Value Ref Range    Troponin I <0.006 <=0.039 ng/mL   Magnesium    Collection Time: 06/05/18  3:15 PM   Result Value Ref Range     Magnesium 2.0 1.3 - 2.7 mg/dL   TSH    Collection Time: 06/05/18  3:15 PM   Result Value Ref Range    TSH 0.353 0.350 - 5.350 mIU/mL   T4, Free    Collection Time: 06/05/18  3:15 PM   Result Value Ref Range    Free T4 1.09 0.89 - 1.76 ng/dL   BNP    Collection Time: 06/05/18  4:27 PM   Result Value Ref Range    BNP 27.0 0.0 - 100.0 pg/mL   Light Blue Top    Collection Time: 06/05/18  4:27 PM   Result Value Ref Range    Extra Tube hold for add-on    Lavender Top    Collection Time: 06/05/18  4:27 PM   Result Value Ref Range    Extra Tube hold for add-on    Gold Top - SST    Collection Time: 06/05/18  4:27 PM   Result Value Ref Range    Extra Tube Hold for add-ons.    CBC Auto Differential    Collection Time: 06/05/18  4:27 PM   Result Value Ref Range    WBC 10.67 3.50 - 10.80 10*3/mm3    RBC 4.41 3.89 - 5.14 10*6/mm3    Hemoglobin 11.9 11.5 - 15.5 g/dL    Hematocrit 36.9 34.5 - 44.0 %    MCV 83.7 80.0 - 99.0 fL    MCH 27.0 27.0 - 31.0 pg    MCHC 32.2 32.0 - 36.0 g/dL    RDW 14.4 11.3 - 14.5 %    RDW-SD 44.3 37.0 - 54.0 fl    MPV 11.3 6.0 - 12.0 fL    Platelets 150 150 - 450 10*3/mm3    Neutrophil % 62.5 41.0 - 71.0 %    Lymphocyte % 28.9 24.0 - 44.0 %    Monocyte % 8.1 0.0 - 12.0 %    Eosinophil % 0.4 0.0 - 3.0 %    Basophil % 0.1 0.0 - 1.0 %    Immature Grans % 0.2 0.0 - 0.6 %    Neutrophils, Absolute 6.68 1.50 - 8.30 10*3/mm3    Lymphocytes, Absolute 3.08 0.60 - 4.80 10*3/mm3    Monocytes, Absolute 0.86 0.00 - 1.00 10*3/mm3    Eosinophils, Absolute 0.04 0.00 - 0.30 10*3/mm3    Basophils, Absolute 0.01 0.00 - 0.20 10*3/mm3    Immature Grans, Absolute 0.02 0.00 - 0.03 10*3/mm3   Urinalysis With / Microscopic If Indicated (No Culture) - Urine, Clean Catch    Collection Time: 06/05/18  5:36 PM   Result Value Ref Range    Color, UA Yellow Yellow, Straw    Appearance, UA Clear Clear    pH, UA 5.5 5.0 - 8.0    Specific Gravity, UA 1.018 1.001 - 1.030    Glucose, UA Negative Negative    Ketones, UA Negative Negative     Bilirubin, UA Negative Negative    Blood, UA Negative Negative    Protein, UA Negative Negative    Leuk Esterase, UA Negative Negative    Nitrite, UA Negative Negative    Urobilinogen, UA 0.2 E.U./dL 0.2 - 1.0 E.U./dL     Note: In addition to lab results from this visit, the labs listed above may include labs taken at another facility or during a different encounter within the last 24 hours. Please correlate lab times with ED admission and discharge times for further clarification of the services performed during this visit.    XR Chest 1 View    (Results Pending)     Vitals:    06/05/18 1740 06/05/18 1744 06/05/18 1830 06/05/18 1900   BP:   107/72 108/75   BP Location:       Patient Position:       Pulse: 64 79 82    Resp:       Temp:       TempSrc:       SpO2: 96% 95% 93% 98%   Weight:       Height:         Medications   sodium chloride 0.9 % flush 10 mL (not administered)   aspirin chewable tablet 324 mg (324 mg Oral Not Given 6/5/18 1626)   sodium chloride 0.9 % flush 1-10 mL (not administered)   sodium chloride 0.9 % infusion (not administered)   acetaminophen (TYLENOL) tablet 650 mg (not administered)   aluminum-magnesium hydroxide-simethicone (MAALOX MAX) 400-400-40 MG/5ML suspension 15 mL (not administered)   diphenhydrAMINE (BENADRYL) injection 25 mg (25 mg Intravenous Given 6/5/18 1640)   dexamethasone (DECADRON) IVPB 10 mg (0 mg Intravenous Stopped 6/5/18 1702)   promethazine (PHENERGAN) injection 12.5 mg (12.5 mg Intravenous Given 6/5/18 1638)   butorphanol (STADOL) injection 1 mg (1 mg Intravenous Given 6/5/18 1702)   butorphanol (STADOL) injection 1 mg (1 mg Intravenous Given 6/5/18 1932)     ECG/EMG Results (last 24 hours)     Procedure Component Value Units Date/Time    ECG 12 Lead [[id number]] Collected:  06/05/18 1509     Updated:  06/05/18 1617    Narrative:       Test Reason : chest pain  Blood Pressure : **/** mmHG  Vent. Rate : 083 BPM     Atrial Rate : 083 BPM     P-R Int : 118 ms          QRS  Dur : 076 ms      QT Int : 368 ms       P-R-T Axes : 047 033 063 degrees     QTc Int : 432 ms    Normal sinus rhythm  Normal ECG  When compared with ECG of 19-MAR-2018 10:17,  No significant change was found  Confirmed by SHOAIB BRAN MD ([id number]) on 6/5/2018 4:17:03 PM    Referred By: MD ER           Confirmed By:SHOAIB BRAN MD                HEART Score (for prediction of 6-week risk of major adverse cardiac event) reviewed and/or performed as part of the patient evaluation and treatment planning process.  The result associated with this review/performance is: 2           MDM    Final diagnoses:   Precordial pain   Headache, unspecified headache type       Documentation assistance provided by [email or web address] Mejia.  Information recorded by the scribe was done at my direction and has been verified and validated by me.     Jimbo Meija  06/05/18 1617       Jimbo CRAVEN Mejia  06/05/18 1642       Jimbo Mejia  06/05/18 1643       Zachary Bran MD  06/05/18 2016

## 2018-06-05 NOTE — H&P
[facility] Medicine Services  HISTORY AND PHYSICAL    Patient Name: Susanna Camilo  : 1975  MRN: [id number]  Primary Care Physician: MIGDALIA Gooden    Subjective   Subjective     Chief Complaint:  Chest pain    HPI:  Susanna Camilo is a 43 y.o. female with history of bipolar disorder, anxiety disorder, depressive disorder, migraine disorder, fibromyalgia, Crohn's who presents to emergency department with complaints of chest pain.  Chest pain started yesterday at rest.  She also experienced palpitations.  She describes chest pain as squeezing.  Went to [place] ER last night where she was told she likely had pleurisy.  Her d-dimer was borderline elevated.  CT pulmonary embolism with contrast was negative.  She was discharged home.  Chest pain persisted today and left arm numbness accompanying it so she presented to the emergency department.  Troponins have been negative.  EKG unremarkable.  Cardiology evaluated patient.  They think she would benefit from a stress test.  Patient developed migraine headache after nitroglycerin.  Cardiology S hospitalist service to admit due to migraine headache.  She received dexamethasone and Stadol in the emergency department.  Of note she was admitted for 7 days back in March for DHE protocol.  When reviewing her chart looks like she is in the emergency department several times per month for the past several months.    Currently she is still experiencing chest pain in her left lower breast area.  It is constant.  She has a migraine headache.  No blurry vision.  She is mildly nauseated.  No abdominal pain.  She is anxious.  She's been under worsening stress lately.  She works as a  and tells me her job is very stressful.  She is also having family issues with her sister which is leading to increased stress and anxiety.  She does have bipolar disorder and has been under control with medications.      Review of Systems     Otherwise  10-system ROS reviewed and is negative except as mentioned in the HPI.    Personal History     Past Medical History:   Diagnosis Date   • Abdominal pain    • Abdominal swelling    • Hoyos's disease    • Bipolar 1 disorder    • Brain tumor     R Frontal Lobe per pt   • Brain tumor 2014   • Brain tumor (benign)    • Constipation    • DDD (degenerative disc disease), cervical 05/29/2017   • DDD (degenerative disc disease), cervical    • Diarrhea    • Fibromyalgia    • IBS (irritable bowel syndrome)    • Migraine    • Nausea & vomiting    • PONV (postoperative nausea and vomiting)    • PTSD (post-traumatic stress disorder)    • Rectal bleeding        Past Surgical History:   Procedure Laterality Date   • ANAL SCOPE N/A 7/28/2016    Procedure: ANAL SCOPE;  Surgeon: Kael Lopez MD;  Location: [facility] OR;  Service:    • APPENDECTOMY     • COLONOSCOPY N/A 6/30/2016    Procedure: COLONOSCOPY  CPTCODE:76855;  Surgeon: Jose Antonio Belle III, MD;  Location: [facility] OR;  Service:    • COLONOSCOPY N/A 7/7/2016    Procedure: COLONOSCOPY (28101) CPT;  Surgeon: Jose Antonio Belle III, MD;  Location: [facility] OR;  Service:    • CYSTOSCOPY RETROGRADE PYELOGRAM Bilateral 4/28/2017    Procedure: CYSTOSCOPY RETROGRADE PYELOGRAM;  Surgeon: Mu Blunt MD;  Location: [facility] OR;  Service:    • ENDOSCOPY N/A 6/30/2016    Procedure: ESOPHAGOGASTRODUODENOSCOPY WITH BIOPSY  CPTCODE:55820;  Surgeon: Jose Antonio Belle III, MD;  Location: [facility] OR;  Service:    • HEMORRHOIDECTOMY N/A 7/28/2016    Procedure: HEMORRHOID STAPLING;  Surgeon: Kael Lopez MD;  Location: [facility] OR;  Service:    • HYSTERECTOMY     • KNEE SURGERY     • LAPAROSCOPIC SALPINGOOPHERECTOMY     • PORTACATH PLACEMENT N/A 7/28/2017    Procedure: INSERTION OF PORTACATH;  Surgeon: Celso Arredondo MD;  Location: [facility] OR;  Service:    • SHOULDER SURGERY      3 times       Family History: family history includes Crohn's disease in her other; Diabetes in her  other; Hypertension in her other; Irritable bowel syndrome in her other.     Social History:  reports that she quit smoking about 2 years ago. She has a 20.00 pack-year smoking history. She has never used smokeless tobacco. She reports that she uses drugs, including Marijuana. She reports that she does not drink alcohol.  Social History     Social History Narrative    Lives in [place], works as a , independent of ADL       Medications:    (Not in a hospital admission)    Allergies   Allergen Reactions   • Ativan [Lorazepam] Hallucinations     confusion   • Sulfa Antibiotics Shortness Of Breath and Swelling   • Compazine [Prochlorperazine Edisylate] Hives   • Demerol [Meperidine] Hives   • Droperidol Itching   • Metoclopramide Swelling   • Toradol [Ketorolac Tromethamine] Hives and Itching   • Zofran [Ondansetron Hcl] Rash       Objective   Objective     Vital Signs:   Temp:  [98.1 °F (36.7 °C)] 98.1 °F (36.7 °C)  Heart Rate:  [64-90] 79  Resp:  [20] 20  BP: ()/(63-93) 107/82        Physical Exam   Constitutional: No acute distress, awake, alert  Eyes: PERRLA, sclerae anicteric, no conjunctival injection  HENT: NCAT, mucous membranes moist  Neck: Supple, trachea midline  Respiratory: Clear to auscultation bilaterally, nonlabored respirations   Cardiovascular: RRR, no murmurs, rubs, or gallops, palpable pedal pulses bilaterally  Gastrointestinal: Positive bowel sounds, soft, nontender, nondistended  Musculoskeletal: No bilateral ankle edema, no clubbing or cyanosis to extremities  Psychiatric: She is anxious and irritable  Neurologic: Oriented x 3, strength symmetric in all extremities,  speech clear  Skin: No rashes    Results Reviewed:  I have personally reviewed current lab, radiology, and data and agree.      Results from last 7 days  Lab Units 06/05/18  1627 06/04/18  1238   WBC 10*3/mm3 10.67 4.45*   HEMOGLOBIN g/dL 11.9 13.3   HEMATOCRIT % 36.9 39.7   PLATELETS 10*3/mm3 150 148   INR   --  1.05        Results from last 7 days  Lab Units 06/05/18  1515 06/04/18  1238   SODIUM mmol/L 143 140   POTASSIUM mmol/L 3.6 3.9   CHLORIDE mmol/L 115* 110   CO2 mmol/L 23.0 24.7   BUN mg/dL 6* 7   CREATININE mg/dL 0.66 0.66   GLUCOSE mg/dL 88 102   CALCIUM mg/dL 9.7 9.4   ALT (SGPT) U/L 73* 81*   AST (SGOT) U/L 42* 64*   TROPONIN I ng/mL <0.006 <0.006     Estimated Creatinine Clearance: 128.2 mL/min (by C-G formula based on SCr of 0.66 mg/dL).  Brief Urine Lab Results  (Last result in the past 365 days)      Color   Clarity   Blood   Leuk Est   Nitrite   Protein   CREAT   Urine HCG        06/05/18 1736 Yellow Clear Negative Negative Negative Negative             BNP   Date Value Ref Range Status   06/05/2018 27.0 0.0 - 100.0 pg/mL Final     Comment:     Results may be falsely decreased if patient taking Biotin.       Imaging Results (last 24 hours)     Procedure Component Value Units Date/Time    XR Chest 1 View [[id number]] Updated:  06/05/18 1606             Assessment/Plan   Assessment / Plan     Hospital Problem List     * (Principal)Precordial pain    Intractable headache    Anxiety    Benign brain tumor    Fibromyalgia    Bipolar 1 disorder    Crohn disease            Assessment & Plan:  --Dr. Kee has seen, he has ordered stress ECHO for in the morning, we will make her NPO after MN  --trial 100% O2 for 15 minutes to try to break headache, will also give gentle IVF to help hydrate her  --avoid IV narcotics  --restart home meds as appropriate  --Long discussion about mood and anxiety with her. I do think underlying psychiatric disorder is playing a part here. She is in the ED at [facility] several times a month for the past 9 months for various complaints  --??drug seeking, Romeo reviewed, she has frequent fills of Norco but chronic narcotics do not seem indicated when reviewing her medical history    DVT prophylaxis:     CODE STATUS:  Full Code    Admission Status:  I believe this patient meets OBSERVATION  status, however if further evaluation or treatment plans warrant, status may change.  Based upon current information, I predict patient's care encounter to be less than or equal to 2 midnights.      Electronically signed by MIGDALIA Tesfaye, 06/05/18, 6:10 PM.      Brief Attending Admission Attestation     I have seen and examined the patient, performing an independent face-to-face diagnostic evaluation with plan of care reviewed and developed with the advanced practice clinician (APC).      Brief Summary Statement/HPI:   Susanna Camilo is a 43 y.o. female with hx of bipolar disorder, chronic migraines, anxiety, depression, fibromyalgia, and Crohn's disease presents to the ER due to chest pain and palpitations associated with SOA. Also has some nausea and diaphoresis. In addition she complains of migraine headache 8/10 in pain, which she thinks was worsened by the nitro she took. Due to her cardiac symptoms, Cardiology evaluated the patient. Recommended stress Echo in the morning. Hospitalists subsequently asked to admit for her other complaints including migraine headache. She was seen at [place] ER yesterday and had a negative workup with unremarkable CTA chest and troponins. Told she had pleurisy and was sent home. Patient reports increased life and work stressors.       Attending Physical Exam:  Constitutional: No acute distress, awake, alert  Eyes: PERRLA, sclerae anicteric, no conjunctival injection  HENT: NCAT, mucous membranes moist  Neck: Supple, no thyromegaly, no lymphadenopathy, trachea midline  Respiratory: Clear to auscultation bilaterally, nonlabored respirations   Cardiovascular: RRR, no murmurs, rubs, or gallops, palpable pedal pulses bilaterally  Gastrointestinal: Positive bowel sounds, soft, nontender, nondistended  Musculoskeletal: No bilateral ankle edema, no clubbing or cyanosis to extremities  Psychiatric: Appropriate affect, cooperative, anxious and rolling forward in the bed rubbing her  head  Neurologic: Oriented x 3, strength symmetric in all extremities, Cranial Nerves grossly intact to confrontation, speech clear  Skin: No rashes        Brief Assessment/Plan :  See above for further detailed assessment and plan developed with [facility] which I have reviewed and/or edited.    42 yo F with hx of bipolar disorder, chronic migraines, anxiety, depression, fibromyalgia, and Crohn's disease presents to the ER due to chest pain and palpitations associated with SOA. In addition she complains of a migraine headache which was worsened by taking nitroglycerin.    PLAN:  --NPO after midnight for stress test in AM. Cardiology to follow up.  --Supportive care for migraine. DHE protocol contraindicated with her chest pain. Consider Neurology consult in AM if no better.   --If stress test unremarkable, I did discuss with patient and  that her stress and anxiety may be playing a role in her symptoms. Needs close PCP follow up.   --Avoid IV narcotics for migraine treatment. LUIS ALBERTO reviewed.     Electronically signed by Val Mejias MD, 06/05/18, 7:07 PM

## 2018-06-05 NOTE — CONSULTS
[facility] at [facility]  Consultation H&P    Patient: Susanna Camilo  1975  [phone]      PCP:  MIGDALIA Gooden   Treatment Team:   Attending Provider: Zachary Bran MD   6/5/2018      DATE OF CONSULTATION: 6/5/2018 4:38 PM     REASON FOR CONSULTATION: Chest pain    ASSESSMENT/PLAN:  Recurrent atypical angina: Negative serial troponins, low risk EKG, CT chest negative.    Trend serial cardiac troponins  Stress echocardiogram tomorrow  No further pharmacologic intervention recommended from a cardiac standpoint    History of Present Illness   43-year-old female with a history of bipolar disorder, generalized anxiety, migraine headaches, IgA nephropathy, hepatitis C, severe urethral stenosis presenting with recurrent severe chest discomfort is been evaluated in the emergency room setting multiple times for these issues over the previous 2 months.  Describes precordial chest pressure and discomfort.  Intermittently sharp in nature and localized however does report feeling as if someone were squeezing her heart in the palm of her hand.  No obvious triggers.  No alleviating or exacerbating factors.  Some associated palpitations that go along with her pain syndrome.  This does appear to flareup her generalized anxiety.  She appears to be a very anxious individual during her interview today.  D-dimer slightly elevated however recent CT angiogram the chest was negative for acute pathology.  Serial troponins negative.  Low risk twelve-lead EKG.  She had previously been scheduled for myocardial perfusion imaging in May 2017 however did not proceed with this as she reports that her primary care physician felt that perfusion imaging would not be safe for her.  Was scheduled for a regular treadmill test in January of this year however that was delayed after a fall resulted in a left clavicular fracture which is now healed.  Currently requesting pain medications for her chest pain and feels  "like she is developing a migraine after receiving sublingual nitroglycerin.    OBJECTIVE:  Vitals:    06/05/18 1502 06/05/18 1525 06/05/18 1527   BP: 119/63 112/78    BP Location: Left arm     Patient Position: Sitting     Pulse: 90  66   Resp: 20     Temp: 98.1 °F (36.7 °C)     TempSrc: Oral     SpO2: 93%  100%   Weight: 73.9 kg (163 lb)     Height: 175.3 cm (69\")       No intake/output data recorded.  No intake/output data recorded.  Intake & Output (last 3 days)     None           PHYSICAL EXAMINATION:    General Appearance:    Alert, cooperative, no distress, appears stated age   Head:    Normocephalic, without obvious abnormality, atraumatic   Eyes:    PERRL, conjunctiva/corneas clear, EOM's intact, fundi     benign, both eyes   Ears:    Normal TM's and external ear canals, both ears   Nose:   Nares normal, septum midline, mucosa normal, no drainage    or sinus tenderness   Throat:   Lips, mucosa, and tongue normal; teeth and gums normal   Neck:   Supple, symmetrical, trachea midline, no adenopathy;     thyroid:  no enlargement/tenderness/nodules; no carotid    bruit or JVD   Back:     Symmetric, no curvature, ROM normal, no CVA tenderness   Lungs:     Clear to auscultation bilaterally, respirations unlabored   Chest Wall:    No tenderness or deformity, Tunneled port in place in the right upper chest.      Heart:    Regular rate and rhythm, S1 and S2 normal, no murmur, rub   or gallop, normal carotid impulse bilaterally without bruit.   Abdomen:     Soft, non-tender, bowel sounds active all four quadrants,     no masses, no organomegaly   Extremities:   Extremities normal, atraumatic, no cyanosis or edema   Pulses:   2+ and symmetric all extremities   Skin:   Skin color, texture, turgor normal, no rashes or lesions   Lymph nodes:   Cervical, supraclavicular, and axillary nodes normal   Neurologic:   CNII-XII intact, normal strength, sensation and reflexes     throughout     PROBLEM LIST:  Active Problems:    * " No active hospital problems. *      Past Medical History:   Diagnosis Date   • Abdominal pain    • Abdominal swelling    • Hoyos's disease    • Bipolar 1 disorder    • Brain tumor     R Frontal Lobe per pt   • Brain tumor 2014   • Brain tumor (benign)    • Constipation    • DDD (degenerative disc disease), cervical 05/29/2017   • DDD (degenerative disc disease), cervical    • Diarrhea    • Fibromyalgia    • IBS (irritable bowel syndrome)    • Migraine    • Nausea & vomiting    • PONV (postoperative nausea and vomiting)    • PTSD (post-traumatic stress disorder)    • Rectal bleeding      Past Surgical History:   Procedure Laterality Date   • ANAL SCOPE N/A 7/28/2016    Procedure: ANAL SCOPE;  Surgeon: Kael Lopez MD;  Location: [facility] OR;  Service:    • APPENDECTOMY     • COLONOSCOPY N/A 6/30/2016    Procedure: COLONOSCOPY  CPTCODE:12344;  Surgeon: Jose Antonio Belle III, MD;  Location: [facility] OR;  Service:    • COLONOSCOPY N/A 7/7/2016    Procedure: COLONOSCOPY (49510) CPT;  Surgeon: Jose Antonio Belle III, MD;  Location: [facility] OR;  Service:    • CYSTOSCOPY RETROGRADE PYELOGRAM Bilateral 4/28/2017    Procedure: CYSTOSCOPY RETROGRADE PYELOGRAM;  Surgeon: Mu Blunt MD;  Location: [facility] OR;  Service:    • ENDOSCOPY N/A 6/30/2016    Procedure: ESOPHAGOGASTRODUODENOSCOPY WITH BIOPSY  CPTCODE:82130;  Surgeon: Jose Antonio Belle III, MD;  Location: [facility] OR;  Service:    • HEMORRHOIDECTOMY N/A 7/28/2016    Procedure: HEMORRHOID STAPLING;  Surgeon: Kael Lopez MD;  Location: [facility] OR;  Service:    • HYSTERECTOMY     • KNEE SURGERY     • LAPAROSCOPIC SALPINGOOPHERECTOMY     • PORTACATH PLACEMENT N/A 7/28/2017    Procedure: INSERTION OF PORTACATH;  Surgeon: Celso Arredondo MD;  Location: [facility] OR;  Service:    • SHOULDER SURGERY      3 times       Allergies  Allergies   Allergen Reactions   • Ativan [Lorazepam] Hallucinations     confusion   • Sulfa Antibiotics Shortness Of Breath and  Swelling   • Compazine [Prochlorperazine Edisylate] Hives   • Demerol [Meperidine] Hives   • Droperidol Itching   • Metoclopramide Swelling   • Toradol [Ketorolac Tromethamine] Hives and Itching   • Zofran [Ondansetron Hcl] Rash       Current Medications    Current Facility-Administered Medications:   •  aspirin chewable tablet 324 mg, 324 mg, Oral, Once, Zachary Bran MD  •  dexamethasone (DECADRON) IVPB 10 mg, 10 mg, Intravenous, Once, Zachary Bran MD  •  diphenhydrAMINE (BENADRYL) injection 25 mg, 25 mg, Intravenous, Once, Zachary Bran MD  •  promethazine (PHENERGAN) injection 12.5 mg, 12.5 mg, Intravenous, Once, Zachary Bran MD  •  sodium chloride 0.9 % flush 10 mL, 10 mL, Intravenous, PRN, Zachary Bran MD  •  Testosterone Cypionate (DEPOTESTOTERONE CYPIONATE) injection 100 mg, 100 mg, Intramuscular, Once, Mu Blunt MD    Current Outpatient Prescriptions:   •  busPIRone (BUSPAR) 15 MG tablet, Take 15 mg by mouth 3 (three) times a day  , Disp: , Rfl:   •  butorphanol (STADOL) 10 MG/ML nasal spray, 1 spray into each nostril Daily., Disp: , Rfl:   •  cetirizine (zyrTEC) 10 MG tablet, Take 1 tablet by mouth Daily., Disp: 30 tablet, Rfl: 0  •  divalproex (DEPAKOTE) 500 MG DR tablet, Take 250 mg by mouth 3 (Three) Times a Day., Disp: , Rfl:   •  doxycycline (MONODOX) 100 MG capsule, Take 1 capsule by mouth 2 (Two) Times a Day., Disp: 20 capsule, Rfl: 0  •  oxyCODONE-acetaminophen (PERCOCET)  MG per tablet, Take 1 tablet by mouth Every 6 (Six) Hours As Needed for Moderate Pain ., Disp: 12 tablet, Rfl: 0  •  pantoprazole (PROTONIX) 40 MG EC tablet, Take 40 mg by mouth Daily As Needed., Disp: , Rfl:   •  promethazine (PHENERGAN) 25 MG tablet, Take 1 tablet by mouth Every 6 (Six) Hours As Needed for Nausea or Vomiting., Disp: 30 tablet, Rfl: 0  •  sertraline (ZOLOFT) 100 MG tablet, Take 100 mg by mouth 2 (Two) Times a Day., Disp: , Rfl:   •  SUMAtriptan (IMITREX) 6 MG/0.5ML solution  injection, Inject 6 mg under the skin 1 (One) Time., Disp: , Rfl:   •  topiramate (TOPAMAX) 25 MG capsule, Take 25 mg by mouth 3 (Three) Times a Day., Disp: , Rfl:            ROS  All systems were reviewed and negative except for:  Constitution:  positive for fatigue  Respiratory: positive for  cough, productive and shortness of air  Cardiovascular: positive for  chest pressure / pain, at rest and palpitations  Gastrointestinal: postitive for  bloating / distention  Musculoskeletal: positive for  joint pain  Behavioral/Psych: positive for  anxiety      SOCIAL HX  Social History     Social History   • Marital status:      Spouse name: N/A   • Number of children: N/A   • Years of education: N/A     Occupational History   • Not on file.     Social History Main Topics   • Smoking status: Former Smoker     Packs/day: 1.00     Years: 20.00     Quit date: 11/1/2015   • Smokeless tobacco: Never Used   • Alcohol use No   • Drug use: Yes     Types: Marijuana      Comment: for pain   • Sexual activity: Defer     Other Topics Concern   • Not on file     Social History Narrative   • No narrative on file       FAMILY HX  Family History   Problem Relation Age of Onset   • Crohn's disease Other    • Hypertension Other    • Diabetes Other    • Irritable bowel syndrome Other          Diagnostic Data:  Lab Results (last 24 hours)     Procedure Component Value Units Date/Time    [place] Draw [[id number]] Collected:  06/05/18 1515    Specimen:  Blood Updated:  06/05/18 1637    Narrative:       The following orders were created for panel order [place] Draw.  Procedure                               Abnormality         Status                     ---------                               -----------         ------                     Light Blue Top[[id number]]                                   In process                 Green Top (Gel)[[id number]]                                  Final result               Lavender Top[[id number]]                                      In process                 Gold Top - SST[[id number]]                                   In process                 Green Top (No Gel)[[id number]]                                                            Please view results for these tests on the individual orders.    Light Blue Top [[id number]] Collected:  06/05/18 1627    Specimen:  Blood Updated:  06/05/18 1635    BNP [[id number]] Collected:  06/05/18 1627    Specimen:  Blood Updated:  06/05/18 1635    Gold Top - SST [[id number]] Collected:  06/05/18 1627    Specimen:  Blood Updated:  06/05/18 1635    CBC & Differential [[id number]] Collected:  06/05/18 1627    Specimen:  Blood Updated:  06/05/18 1635    Narrative:       The following orders were created for panel order CBC & Differential.  Procedure                               Abnormality         Status                     ---------                               -----------         ------                     CBC Auto Differential[[id number]]                            In process                   Please view results for these tests on the individual orders.    Lavender Top [[id number]] Collected:  06/05/18 1627    Specimen:  Blood Updated:  06/05/18 1635    CBC Auto Differential [[id number]] Collected:  06/05/18 1627    Specimen:  Blood Updated:  06/05/18 1635    Green Top (Gel) [[id number]] Collected:  06/05/18 1515    Specimen:  Blood Updated:  06/05/18 1630     Extra Tube Hold for add-ons.     Comment: Auto resulted.       Magnesium [[id number]] Collected:  06/05/18 1515    Specimen:  Blood Updated:  06/05/18 1624    TSH [[id number]] Collected:  06/05/18 1515    Specimen:  Blood Updated:  06/05/18 1624    T4, Free [[id number]] Collected:  06/05/18 1515    Specimen:  Blood Updated:  06/05/18 1623    Comprehensive Metabolic Panel [[id number]]  (Abnormal) Collected:  06/05/18 1515    Specimen:  Blood Updated:  06/05/18 1602     Glucose 88 mg/dL      BUN 6 (L) mg/dL      Creatinine 0.66 mg/dL       Sodium 143 mmol/L      Potassium 3.6 mmol/L      Chloride 115 (H) mmol/L      CO2 23.0 mmol/L      Calcium 9.7 mg/dL      Total Protein 7.4 g/dL      Albumin 4.46 g/dL      ALT (SGPT) 73 (H) U/L      AST (SGOT) 42 (H) U/L      Alkaline Phosphatase 77 U/L      Total Bilirubin 0.5 mg/dL      eGFR Non African Amer 98 mL/min/1.73      Globulin 2.9 gm/dL      A/G Ratio 1.5 g/dL      BUN/Creatinine Ratio 9.1     Anion Gap 5.0 mmol/L     Narrative:       [employer] Guidelines    Stage     Description        GFR  1         Normal or High     90+  2         Mild decrease      60-89  3         Moderate decrease  30-59  4         Severe decrease    15-29  5         Kidney failure     <15    Troponin [[id number]]  (Normal) Collected:  06/05/18 1515    Specimen:  Blood Updated:  06/05/18 1602     Troponin I <0.006 ng/mL      Comment: Results may be falsely decreased if patient taking Biotin.       Lipase [[id number]] Collected:  06/05/18 1515    Specimen:  Blood Updated:  06/05/18 1518        ECG/EMG Results (last 24 hours)     Procedure Component Value Units Date/Time    ECG 12 Lead [[id number]] Collected:  06/05/18 1509     Updated:  06/05/18 1617    Narrative:       Test Reason : chest pain  Blood Pressure : **/** mmHG  Vent. Rate : 083 BPM     Atrial Rate : 083 BPM     P-R Int : 118 ms          QRS Dur : 076 ms      QT Int : 368 ms       P-R-T Axes : 047 033 063 degrees     QTc Int : 432 ms    Normal sinus rhythm  Normal ECG  When compared with ECG of 19-MAR-2018 10:17,  No significant change was found  Confirmed by SHOAIB WHALEN MD ([id number]) on 6/5/2018 4:17:03 PM    Referred By: MD ER           Confirmed By:SHOAIB WHALEN MD             Active Problems:    * No active hospital problems. *          Alvin Kee III, MD   4:38 PM 6/5/2018

## 2018-06-06 ENCOUNTER — APPOINTMENT (OUTPATIENT)
Dept: CARDIOLOGY | Facility: HOSPITAL | Age: 43
End: 2018-06-06
Attending: INTERNAL MEDICINE

## 2018-06-06 PROBLEM — G43.909 MIGRAINE: Status: ACTIVE | Noted: 2018-06-06

## 2018-06-06 LAB
ALBUMIN SERPL-MCNC: 4.09 G/DL (ref 3.2–4.8)
ALBUMIN/GLOB SERPL: 1.7 G/DL (ref 1.5–2.5)
ALP SERPL-CCNC: 67 U/L (ref 25–100)
ALT SERPL W P-5'-P-CCNC: 61 U/L (ref 7–40)
ANION GAP SERPL CALCULATED.3IONS-SCNC: 4 MMOL/L (ref 3–11)
AST SERPL-CCNC: 33 U/L (ref 0–33)
BASOPHILS # BLD AUTO: 0 10*3/MM3 (ref 0–0.2)
BASOPHILS NFR BLD AUTO: 0 % (ref 0–1)
BH CV ECHO MEAS - AO ROOT AREA (BSA CORRECTED): 1.5
BH CV ECHO MEAS - AO ROOT AREA: 5.5 CM^2
BH CV ECHO MEAS - AO ROOT DIAM: 2.6 CM
BH CV ECHO MEAS - BSA(HAYCOCK): 1.8 M^2
BH CV ECHO MEAS - BSA: 1.8 M^2
BH CV ECHO MEAS - BZI_BMI: 20.8 KILOGRAMS/M^2
BH CV ECHO MEAS - BZI_METRIC_HEIGHT: 175.3 CM
BH CV ECHO MEAS - BZI_METRIC_WEIGHT: 64 KG
BH CV ECHO MEAS - EDV(CUBED): 104.2 ML
BH CV ECHO MEAS - EDV(TEICH): 102.7 ML
BH CV ECHO MEAS - EF(CUBED): 75.1 %
BH CV ECHO MEAS - EF(TEICH): 67 %
BH CV ECHO MEAS - ESV(CUBED): 25.9 ML
BH CV ECHO MEAS - ESV(TEICH): 33.8 ML
BH CV ECHO MEAS - FS: 37.1 %
BH CV ECHO MEAS - IVS/LVPW: 1.2
BH CV ECHO MEAS - IVSD: 0.99 CM
BH CV ECHO MEAS - LA DIMENSION: 2.9 CM
BH CV ECHO MEAS - LA/AO: 1.1
BH CV ECHO MEAS - LV MASS(C)D: 141.4 GRAMS
BH CV ECHO MEAS - LV MASS(C)DI: 79.4 GRAMS/M^2
BH CV ECHO MEAS - LVIDD: 4.7 CM
BH CV ECHO MEAS - LVIDS: 3 CM
BH CV ECHO MEAS - LVPWD: 0.8 CM
BH CV ECHO MEAS - SI(CUBED): 44 ML/M^2
BH CV ECHO MEAS - SI(TEICH): 38.7 ML/M^2
BH CV ECHO MEAS - SV(CUBED): 78.3 ML
BH CV ECHO MEAS - SV(TEICH): 68.8 ML
BH CV STRESS BP STAGE 1: NORMAL
BH CV STRESS BP STAGE 2: NORMAL
BH CV STRESS DURATION MIN STAGE 1: 3
BH CV STRESS DURATION MIN STAGE 2: 3
BH CV STRESS DURATION MIN STAGE 3: 0
BH CV STRESS DURATION SEC STAGE 1: 0
BH CV STRESS DURATION SEC STAGE 2: 0
BH CV STRESS DURATION SEC STAGE 3: 42
BH CV STRESS GRADE STAGE 1: 10
BH CV STRESS GRADE STAGE 2: 12
BH CV STRESS GRADE STAGE 3: 14
BH CV STRESS HR STAGE 1: 105
BH CV STRESS HR STAGE 2: 131
BH CV STRESS HR STAGE 3: 151
BH CV STRESS METS STAGE 1: 5
BH CV STRESS METS STAGE 2: 7.5
BH CV STRESS METS STAGE 3: 10
BH CV STRESS O2 STAGE 2: 97
BH CV STRESS PROTOCOL 1: NORMAL
BH CV STRESS RECOVERY BP: NORMAL MMHG
BH CV STRESS RECOVERY HR: 71 BPM
BH CV STRESS SPEED STAGE 1: 1.7
BH CV STRESS SPEED STAGE 2: 2.5
BH CV STRESS SPEED STAGE 3: 3.4
BH CV STRESS STAGE 1: 1
BH CV STRESS STAGE 2: 2
BH CV STRESS STAGE 3: 3
BILIRUB SERPL-MCNC: 0.5 MG/DL (ref 0.3–1.2)
BUN BLD-MCNC: 9 MG/DL (ref 9–23)
BUN/CREAT SERPL: 14.3 (ref 7–25)
CALCIUM SPEC-SCNC: 8.8 MG/DL (ref 8.7–10.4)
CHLORIDE SERPL-SCNC: 112 MMOL/L (ref 99–109)
CO2 SERPL-SCNC: 26 MMOL/L (ref 20–31)
CREAT BLD-MCNC: 0.63 MG/DL (ref 0.6–1.3)
DEPRECATED RDW RBC AUTO: 45.6 FL (ref 37–54)
EOSINOPHIL # BLD AUTO: 0 10*3/MM3 (ref 0–0.3)
EOSINOPHIL NFR BLD AUTO: 0 % (ref 0–3)
ERYTHROCYTE [DISTWIDTH] IN BLOOD BY AUTOMATED COUNT: 14.7 % (ref 11.3–14.5)
GFR SERPL CREATININE-BSD FRML MDRD: 103 ML/MIN/1.73
GLOBULIN UR ELPH-MCNC: 2.4 GM/DL
GLUCOSE BLD-MCNC: 110 MG/DL (ref 70–100)
HCT VFR BLD AUTO: 35.4 % (ref 34.5–44)
HGB BLD-MCNC: 11.3 G/DL (ref 11.5–15.5)
IMM GRANULOCYTES # BLD: 0.02 10*3/MM3 (ref 0–0.03)
IMM GRANULOCYTES NFR BLD: 0.2 % (ref 0–0.6)
LYMPHOCYTES # BLD AUTO: 1.32 10*3/MM3 (ref 0.6–4.8)
LYMPHOCYTES NFR BLD AUTO: 15.3 % (ref 24–44)
MAXIMAL PREDICTED HEART RATE: 177 BPM
MCH RBC QN AUTO: 27.1 PG (ref 27–31)
MCHC RBC AUTO-ENTMCNC: 31.9 G/DL (ref 32–36)
MCV RBC AUTO: 84.9 FL (ref 80–99)
MONOCYTES # BLD AUTO: 0.47 10*3/MM3 (ref 0–1)
MONOCYTES NFR BLD AUTO: 5.5 % (ref 0–12)
NEUTROPHILS # BLD AUTO: 6.82 10*3/MM3 (ref 1.5–8.3)
NEUTROPHILS NFR BLD AUTO: 79.2 % (ref 41–71)
PERCENT MAX PREDICTED HR: 85.31 %
PLATELET # BLD AUTO: 139 10*3/MM3 (ref 150–450)
PMV BLD AUTO: 11 FL (ref 6–12)
POTASSIUM BLD-SCNC: 4.1 MMOL/L (ref 3.5–5.5)
PROT SERPL-MCNC: 6.5 G/DL (ref 5.7–8.2)
RBC # BLD AUTO: 4.17 10*6/MM3 (ref 3.89–5.14)
SODIUM BLD-SCNC: 142 MMOL/L (ref 132–146)
STRESS BASELINE BP: NORMAL MMHG
STRESS BASELINE HR: 60 BPM
STRESS O2 SAT REST: 97 %
STRESS PERCENT HR: 100 %
STRESS POST ESTIMATED WORKLOAD: 8.2 METS
STRESS POST EXERCISE DUR MIN: 6 MIN
STRESS POST EXERCISE DUR SEC: 42 SEC
STRESS POST O2 SAT PEAK: 97 %
STRESS POST PEAK BP: NORMAL MMHG
STRESS POST PEAK HR: 151 BPM
STRESS TARGET HR: 150 BPM
TROPONIN I SERPL-MCNC: <0.006 NG/ML
WBC NRBC COR # BLD: 8.61 10*3/MM3 (ref 3.5–10.8)

## 2018-06-06 PROCEDURE — 84484 ASSAY OF TROPONIN QUANT: CPT | Performed by: NURSE PRACTITIONER

## 2018-06-06 PROCEDURE — 25010000002 PROMETHAZINE PER 50 MG: Performed by: HOSPITALIST

## 2018-06-06 PROCEDURE — 93010 ELECTROCARDIOGRAM REPORT: CPT | Performed by: INTERNAL MEDICINE

## 2018-06-06 PROCEDURE — 93018 CV STRESS TEST I&R ONLY: CPT | Performed by: INTERNAL MEDICINE

## 2018-06-06 PROCEDURE — 25010000002 MORPHINE SULFATE (PF) 2 MG/ML SOLUTION: Performed by: NURSE PRACTITIONER

## 2018-06-06 PROCEDURE — 25010000002 BUTORPHANOL PER 1 MG: Performed by: HOSPITALIST

## 2018-06-06 PROCEDURE — 85025 COMPLETE CBC W/AUTO DIFF WBC: CPT | Performed by: NURSE PRACTITIONER

## 2018-06-06 PROCEDURE — 25010000002 PROMETHAZINE PER 50 MG: Performed by: PSYCHIATRY & NEUROLOGY

## 2018-06-06 PROCEDURE — 93350 STRESS TTE ONLY: CPT

## 2018-06-06 PROCEDURE — 80053 COMPREHEN METABOLIC PANEL: CPT | Performed by: NURSE PRACTITIONER

## 2018-06-06 PROCEDURE — 93350 STRESS TTE ONLY: CPT | Performed by: INTERNAL MEDICINE

## 2018-06-06 PROCEDURE — 63710000001 PROMETHAZINE PER 25 MG: Performed by: NURSE PRACTITIONER

## 2018-06-06 PROCEDURE — 93017 CV STRESS TEST TRACING ONLY: CPT

## 2018-06-06 PROCEDURE — 93352 ADMIN ECG CONTRAST AGENT: CPT | Performed by: INTERNAL MEDICINE

## 2018-06-06 PROCEDURE — 93005 ELECTROCARDIOGRAM TRACING: CPT | Performed by: NURSE PRACTITIONER

## 2018-06-06 PROCEDURE — 25010000002 BUTORPHANOL PER 1 MG: Performed by: PSYCHIATRY & NEUROLOGY

## 2018-06-06 PROCEDURE — 25010000002 ENOXAPARIN PER 10 MG: Performed by: HOSPITALIST

## 2018-06-06 PROCEDURE — 25010000002 DIHYDROERGOTAMINE MESYLATE PER 1 MG: Performed by: PSYCHIATRY & NEUROLOGY

## 2018-06-06 PROCEDURE — 99233 SBSQ HOSP IP/OBS HIGH 50: CPT | Performed by: HOSPITALIST

## 2018-06-06 PROCEDURE — 99253 IP/OBS CNSLTJ NEW/EST LOW 45: CPT | Performed by: PSYCHIATRY & NEUROLOGY

## 2018-06-06 PROCEDURE — 25010000002 SULFUR HEXAFLUORIDE MICROSPH 60.7-25 MG RECONSTITUTED SUSPENSION: Performed by: HOSPITALIST

## 2018-06-06 RX ORDER — PHENAZOPYRIDINE HYDROCHLORIDE 100 MG/1
100 TABLET, FILM COATED ORAL 3 TIMES DAILY PRN
COMMUNITY
End: 2018-12-05

## 2018-06-06 RX ORDER — ONDANSETRON 4 MG/1
4 TABLET, FILM COATED ORAL EVERY 8 HOURS PRN
COMMUNITY
End: 2018-06-09 | Stop reason: HOSPADM

## 2018-06-06 RX ORDER — LIDOCAINE 50 MG/G
1 PATCH TOPICAL
Status: DISCONTINUED | OUTPATIENT
Start: 2018-06-06 | End: 2018-06-09 | Stop reason: HOSPADM

## 2018-06-06 RX ORDER — PROMETHAZINE HYDROCHLORIDE 25 MG/ML
12.5 INJECTION, SOLUTION INTRAMUSCULAR; INTRAVENOUS ONCE
Status: COMPLETED | OUTPATIENT
Start: 2018-06-06 | End: 2018-06-06

## 2018-06-06 RX ORDER — BUTORPHANOL TARTRATE 1 MG/ML
2 INJECTION, SOLUTION INTRAMUSCULAR; INTRAVENOUS EVERY 4 HOURS PRN
Status: DISCONTINUED | OUTPATIENT
Start: 2018-06-06 | End: 2018-06-09 | Stop reason: HOSPADM

## 2018-06-06 RX ORDER — ALBUTEROL SULFATE 90 UG/1
2 AEROSOL, METERED RESPIRATORY (INHALATION) 2 TIMES DAILY
COMMUNITY

## 2018-06-06 RX ORDER — TOPIRAMATE 25 MG/1
50 CAPSULE, COATED PELLETS ORAL EVERY 12 HOURS SCHEDULED
Status: DISCONTINUED | OUTPATIENT
Start: 2018-06-06 | End: 2018-06-07

## 2018-06-06 RX ORDER — FLUTICASONE PROPIONATE 50 MCG
2 SPRAY, SUSPENSION (ML) NASAL DAILY
Status: DISCONTINUED | OUTPATIENT
Start: 2018-06-07 | End: 2018-06-09 | Stop reason: HOSPADM

## 2018-06-06 RX ORDER — ALBUTEROL SULFATE 2.5 MG/3ML
2.5 SOLUTION RESPIRATORY (INHALATION) EVERY 4 HOURS PRN
Status: DISCONTINUED | OUTPATIENT
Start: 2018-06-06 | End: 2018-06-09 | Stop reason: HOSPADM

## 2018-06-06 RX ORDER — BUTORPHANOL TARTRATE 1 MG/ML
1 INJECTION, SOLUTION INTRAMUSCULAR; INTRAVENOUS ONCE
Status: COMPLETED | OUTPATIENT
Start: 2018-06-06 | End: 2018-06-06

## 2018-06-06 RX ORDER — BUTORPHANOL TARTRATE 1 MG/ML
1 INJECTION, SOLUTION INTRAMUSCULAR; INTRAVENOUS EVERY 12 HOURS PRN
Status: DISCONTINUED | OUTPATIENT
Start: 2018-06-06 | End: 2018-06-06

## 2018-06-06 RX ORDER — DIHYDROERGOTAMINE MESYLATE 1 MG/ML
0.5 INJECTION, SOLUTION INTRAMUSCULAR; INTRAVENOUS; SUBCUTANEOUS EVERY 8 HOURS
Status: DISCONTINUED | OUTPATIENT
Start: 2018-06-06 | End: 2018-06-07

## 2018-06-06 RX ORDER — POTASSIUM CHLORIDE 750 MG/1
10 TABLET, EXTENDED RELEASE ORAL DAILY
COMMUNITY
End: 2018-06-09 | Stop reason: HOSPADM

## 2018-06-06 RX ORDER — AZELASTINE 1 MG/ML
1 SPRAY, METERED NASAL DAILY PRN
Status: DISCONTINUED | OUTPATIENT
Start: 2018-06-06 | End: 2018-06-09 | Stop reason: HOSPADM

## 2018-06-06 RX ORDER — PROMETHAZINE HYDROCHLORIDE 25 MG/ML
12.5 INJECTION, SOLUTION INTRAMUSCULAR; INTRAVENOUS EVERY 6 HOURS
Status: DISCONTINUED | OUTPATIENT
Start: 2018-06-06 | End: 2018-06-09 | Stop reason: HOSPADM

## 2018-06-06 RX ORDER — MONTELUKAST SODIUM 10 MG/1
10 TABLET ORAL NIGHTLY
Status: DISCONTINUED | OUTPATIENT
Start: 2018-06-06 | End: 2018-06-09 | Stop reason: HOSPADM

## 2018-06-06 RX ORDER — HYDROCODONE BITARTRATE AND ACETAMINOPHEN 7.5; 325 MG/1; MG/1
1 TABLET ORAL 2 TIMES DAILY PRN
COMMUNITY
End: 2018-06-26 | Stop reason: SDUPTHER

## 2018-06-06 RX ORDER — MORPHINE SULFATE 2 MG/ML
1 INJECTION, SOLUTION INTRAMUSCULAR; INTRAVENOUS ONCE
Status: COMPLETED | OUTPATIENT
Start: 2018-06-06 | End: 2018-06-06

## 2018-06-06 RX ORDER — AZELASTINE HYDROCHLORIDE 137 UG/1
1 SPRAY, METERED NASAL AS NEEDED
COMMUNITY

## 2018-06-06 RX ADMIN — ENOXAPARIN SODIUM 40 MG: 40 INJECTION SUBCUTANEOUS at 12:59

## 2018-06-06 RX ADMIN — PROMETHAZINE HYDROCHLORIDE 12.5 MG: 25 INJECTION INTRAMUSCULAR; INTRAVENOUS at 14:32

## 2018-06-06 RX ADMIN — SERTRALINE HYDROCHLORIDE 100 MG: 100 TABLET ORAL at 20:31

## 2018-06-06 RX ADMIN — MORPHINE SULFATE 1 MG: 10 INJECTION INTRAVENOUS at 00:29

## 2018-06-06 RX ADMIN — BUSPIRONE HYDROCHLORIDE 15 MG: 15 TABLET ORAL at 11:20

## 2018-06-06 RX ADMIN — BUTORPHANOL TARTRATE 2 MG: 1 INJECTION, SOLUTION INTRAMUSCULAR; INTRAVENOUS at 18:24

## 2018-06-06 RX ADMIN — VALPROATE SODIUM 500 MG: 100 INJECTION, SOLUTION INTRAVENOUS at 22:36

## 2018-06-06 RX ADMIN — LIDOCAINE 1 PATCH: 50 PATCH CUTANEOUS at 12:58

## 2018-06-06 RX ADMIN — BUSPIRONE HYDROCHLORIDE 15 MG: 15 TABLET ORAL at 20:31

## 2018-06-06 RX ADMIN — DIHYDROERGOTAMINE MESYLATE 0.5 MG: 1 INJECTION, SOLUTION INTRAMUSCULAR; INTRAVENOUS; SUBCUTANEOUS at 22:36

## 2018-06-06 RX ADMIN — PANTOPRAZOLE SODIUM 40 MG: 40 TABLET, DELAYED RELEASE ORAL at 05:48

## 2018-06-06 RX ADMIN — DIHYDROERGOTAMINE MESYLATE 0.5 MG: 1 INJECTION, SOLUTION INTRAMUSCULAR; INTRAVENOUS; SUBCUTANEOUS at 15:39

## 2018-06-06 RX ADMIN — BUTORPHANOL TARTRATE 2 MG: 1 INJECTION, SOLUTION INTRAMUSCULAR; INTRAVENOUS at 14:32

## 2018-06-06 RX ADMIN — BUSPIRONE HYDROCHLORIDE 15 MG: 15 TABLET ORAL at 15:39

## 2018-06-06 RX ADMIN — PROMETHAZINE HYDROCHLORIDE 12.5 MG: 25 INJECTION INTRAMUSCULAR; INTRAVENOUS at 20:31

## 2018-06-06 RX ADMIN — PROMETHAZINE HYDROCHLORIDE 12.5 MG: 25 INJECTION INTRAMUSCULAR; INTRAVENOUS at 08:24

## 2018-06-06 RX ADMIN — PROMETHAZINE HYDROCHLORIDE 25 MG: 25 TABLET ORAL at 05:48

## 2018-06-06 RX ADMIN — SERTRALINE HYDROCHLORIDE 100 MG: 100 TABLET ORAL at 11:22

## 2018-06-06 RX ADMIN — TOPIRAMATE 25 MG: 25 CAPSULE, COATED PELLETS ORAL at 13:35

## 2018-06-06 RX ADMIN — BUTORPHANOL TARTRATE 2 MG: 1 INJECTION, SOLUTION INTRAMUSCULAR; INTRAVENOUS at 22:35

## 2018-06-06 RX ADMIN — DIVALPROEX SODIUM 250 MG: 250 TABLET, DELAYED RELEASE ORAL at 12:58

## 2018-06-06 RX ADMIN — SULFUR HEXAFLUORIDE 5 ML: KIT at 10:10

## 2018-06-06 RX ADMIN — BUTORPHANOL TARTRATE 1 MG: 2 INJECTION, SOLUTION INTRAMUSCULAR; INTRAVENOUS at 09:47

## 2018-06-06 RX ADMIN — MONTELUKAST SODIUM 10 MG: 10 TABLET, FILM COATED ORAL at 22:36

## 2018-06-06 RX ADMIN — TOPIRAMATE 50 MG: 25 CAPSULE, COATED PELLETS ORAL at 20:31

## 2018-06-06 NOTE — CONSULTS
Neurology    Referring Provider: Dr. Virgen    Reason for Consultation: migraine      Chief complaint: chest pain    Subjective .     History of present illness:  Mrs. Camilo is an interesting 43-year-old female with a history of bipolar disorder, anxiety disorder, depressive disorder, fibromyalgia, migraines, Crohn's disease who is admitted to the hospital service for reported chest pain.  Per family she was having some chest pain this started at rest had some palpitations.  She is taking some nitroglycerin at home and then suddenly developed an exacerbation of her migraines.  Of note she was recently admitted to [facility] in March 2018 for status migrainosus and was seen by Dr. Key at that time.  She reports that interventions that help her migraine have included DHE, Stadol, and IV Phenergan.  Cardiology has evaluated her and a stress test which was reportedly unremarkable.  Neurology was consulted for assistance with migraines.    Review of Systems: Positive for headache and chest pain.Otherwise complete review of systems was discussed with the patient and found to be negative except for that mentioned in history of present illness.    History  Past Medical History:   Diagnosis Date   • Abdominal pain    • Abdominal swelling    • Hoyos's disease    • Bipolar 1 disorder    • Brain tumor     R Frontal Lobe per pt   • Brain tumor 2014   • Brain tumor (benign)    • Constipation    • DDD (degenerative disc disease), cervical 05/29/2017   • DDD (degenerative disc disease), cervical    • Diarrhea    • Fibromyalgia    • IBS (irritable bowel syndrome)    • Migraine    • Nausea & vomiting    • PONV (postoperative nausea and vomiting)    • PTSD (post-traumatic stress disorder)    • Rectal bleeding    ,   Past Surgical History:   Procedure Laterality Date   • ANAL SCOPE N/A 7/28/2016    Procedure: ANAL SCOPE;  Surgeon: Kael Lopez MD;  Location: [facility];  Service:    • APPENDECTOMY     • COLONOSCOPY N/A 6/30/2016     Procedure: COLONOSCOPY  CPTCODE:88207;  Surgeon: Jose Antonio Belle III, MD;  Location: [facility] OR;  Service:    • COLONOSCOPY N/A 7/7/2016    Procedure: COLONOSCOPY (28850) CPT;  Surgeon: Jose Antonio Belle III, MD;  Location: [facility] OR;  Service:    • CYSTOSCOPY RETROGRADE PYELOGRAM Bilateral 4/28/2017    Procedure: CYSTOSCOPY RETROGRADE PYELOGRAM;  Surgeon: Mu Blunt MD;  Location: [facility] OR;  Service:    • ENDOSCOPY N/A 6/30/2016    Procedure: ESOPHAGOGASTRODUODENOSCOPY WITH BIOPSY  CPTCODE:81039;  Surgeon: Jose Antonio Belle III, MD;  Location: [facility] OR;  Service:    • HEMORRHOIDECTOMY N/A 7/28/2016    Procedure: HEMORRHOID STAPLING;  Surgeon: Kael Lopez MD;  Location: [facility] OR;  Service:    • HYSTERECTOMY     • KNEE SURGERY     • LAPAROSCOPIC SALPINGOOPHERECTOMY     • PORTACATH PLACEMENT N/A 7/28/2017    Procedure: INSERTION OF PORTACATH;  Surgeon: Celso Arredondo MD;  Location: [facility] OR;  Service:    • SHOULDER SURGERY      3 times   ,   Family History   Problem Relation Age of Onset   • Crohn's disease Other    • Hypertension Other    • Diabetes Other    • Irritable bowel syndrome Other    ,   Social History   Substance Use Topics   • Smoking status: Former Smoker     Packs/day: 1.00     Years: 20.00     Quit date: 11/1/2015   • Smokeless tobacco: Never Used   • Alcohol use No   ,   Facility-Administered Medications Prior to Admission   Medication Dose Route Frequency Provider Last Rate Last Dose   • Testosterone Cypionate (DEPOTESTOTERONE CYPIONATE) injection 100 mg  100 mg Intramuscular Once Mu Blunt MD         Prescriptions Prior to Admission   Medication Sig Dispense Refill Last Dose   • albuterol (PROVENTIL HFA;VENTOLIN HFA) 108 (90 Base) MCG/ACT inhaler Inhale 2 puffs 2 (Two) Times a Day.      • Azelastine HCl 137 MCG/SPRAY solution 1 spray into each nostril As Needed (Allergies).      • busPIRone (BUSPAR) 15 MG tablet Take 15 mg by mouth 3 (three) times a  "day      Taking   • butorphanol (STADOL) 10 MG/ML nasal spray 1 spray into each nostril Daily.   Taking   • dicyclomine (BENTYL) 10 MG capsule Take 10 mg by mouth 3 (Three) Times a Day.      • divalproex (DEPAKOTE) 250 MG DR tablet Take 250 mg by mouth 3 (Three) Times a Day.   Taking   • fluticasone (VERAMYST) 27.5 MCG/SPRAY nasal spray 2 sprays into each nostril Daily.      • HYDROcodone-acetaminophen (NORCO) 7.5-325 MG per tablet Take 1 tablet by mouth 2 (Two) Times a Day As Needed for Moderate Pain .      • montelukast (SINGULAIR) 10 MG tablet Take 10 mg by mouth Every Night.      • ondansetron (ZOFRAN) 4 MG tablet Take 4 mg by mouth Every 8 (Eight) Hours As Needed for Nausea or Vomiting.      • pantoprazole (PROTONIX) 40 MG EC tablet Take 40 mg by mouth 2 (Two) Times a Day As Needed.   Taking   • phenazopyridine (PYRIDIUM) 100 MG tablet Take 100 mg by mouth 3 (Three) Times a Day As Needed for bladder spasms.      • potassium chloride (K-DUR,KLOR-CON) 10 MEQ CR tablet Take 10 mEq by mouth Daily.      • sertraline (ZOLOFT) 100 MG tablet Take 100 mg by mouth 2 (Two) Times a Day.   Taking   • SUMAtriptan (IMITREX) 6 MG/0.5ML solution injection Inject 6 mg under the skin 1 (One) Time.   Taking   • topiramate (TOPAMAX) 25 MG capsule Take 25 mg by mouth 3 (Three) Times a Day.      • cetirizine (zyrTEC) 10 MG tablet Take 1 tablet by mouth Daily. 30 tablet 0 Taking   • doxycycline (MONODOX) 100 MG capsule Take 1 capsule by mouth 2 (Two) Times a Day. 20 capsule 0     and Allergies:  Ativan [lorazepam]; Sulfa antibiotics; Compazine [prochlorperazine edisylate]; Demerol [meperidine]; Droperidol; Metoclopramide; Toradol [ketorolac tromethamine]; and Zofran [ondansetron hcl]    Objective     Vital Signs   Blood pressure 110/88, pulse 63, temperature 97.5 °F (36.4 °C), temperature source Oral, resp. rate 18, height 175.3 cm (69.02\"), weight 64 kg (141 lb 1.5 oz), SpO2 95 %, not currently breastfeeding.    Physical " Exam:      Gen: Sitting up in bed with eyes open.  In mild distress   Eyes: PERRL, conjuntivae/lids normal   ENT: External canals normal bilaterally. Oropharynx normal.    Neck: Supple. No thyroid enlargement noted   Respiratory: CTA bilaterally. Respirations unlabored   CV: RRR, S1 and S2 nml. Radial pulses 2+ bilaterally.    Skin: No rashes noted on exposed skin. Normal tugor.   MSK: Normal bulk and tone. Nml ROM     Neurologic:   Mental status: Awake, alert, oriented x4. Follows commands. Speech fluent.    CN: PERRL, EOM intact, sensation intact in upper/mid/lower face bilaterally, facial movements symmetric, hearing intact to finger rub bilaterally, palate elevates symmetrically, tongue movements and SCMs strong bilaterally    Motor: Strength full (5/5) throughout in BUE and BLE    Reflexes: 2+ throughout    Sensory: Intact to LT throughout   Coordination: No dysmetria noted   Gait: Not tested        Results Reviewed:     Labs reviewed   Stress echo report reviewed                Assessment/Plan     1.  Migraine = will reinitiate trials used by Dr. Key during previous admission. Stadol 2mg IV Q4hr prn, DHE 0.5mg Q8 hours x3 days, promethazine 12.5mg Q6 hours. Will schedule her antiemetic and hold if nausea improves. Will also change her Depakote to IV Depacon 500mg Q8 hours for abortive relief. Patient expressed gratitude for these changes and asked for Dr. Key to assume consultative care tomorrow. She also requests referral to outpatient neuro clinic with Dr. Schwab for migraine management.        Ml Ybarra MD  06/06/18  1:59 PM

## 2018-06-06 NOTE — PROGRESS NOTES
[facility] Medicine Services  PROGRESS NOTE    Patient Name: Susanna Camilo  : 1975  MRN: [id number]    Date of Admission: 2018  Length of Stay: 0  Primary Care Physician: MIGDALIA Gooden    Subjective   Subjective     CC:  FU CP    HPI:  Migraines worsened by nitro. Similar to her previous migraines. Threw up all of her PO meds. Not currently having CP. L sided sharp, reproducible pin-point rib that is pleuritic as well. Coughing for the past 3 weeks since having influenza. Not worse. Having seasonal allergies as well.  at bedside    Review of Systems  Gen- No fevers, chills  CV- No chest pain, palpitations  Resp- No cough, dyspnea  GI- No N/V/D, abd pain    Otherwise ROS is negative except as mentioned in the HPI.    Objective   Objective     Vital Signs:   Temp:  [97.5 °F (36.4 °C)-98.2 °F (36.8 °C)] 97.5 °F (36.4 °C)  Heart Rate:  [51-90] 63  Resp:  [16-20] 18  BP: ()/(51-93) 110/88        Physical Exam:  Constitutional: No acute distress, awake, alert on RA  Eyes: PERRLA, sclerae anicteric, no conjunctival injection  HENT: NCAT, mucous membranes moist  Neck: Supple, no thyromegaly, no lymphadenopathy, trachea midline  Respiratory: Clear to auscultation bilaterally, nonlabored respirations   Cardiovascular: RRR, no murmurs, rubs, or gallops, palpable pedal pulses bilaterally  Gastrointestinal: Positive bowel sounds, soft, nontender, nondistended  Musculoskeletal: No bilateral ankle edema, no clubbing or cyanosis to extremities  Psychiatric: Appropriate affect, cooperative  Neurologic: Oriented x 3, strength symmetric in all extremities, Cranial Nerves grossly intact to confrontation, speech clear  Skin: No rashes    Results Reviewed:  I have personally reviewed current lab, radiology, and data and agree.      Results from last 7 days  Lab Units 18  0636 18  1627 18  1238   WBC 10*3/mm3 8.61 10.67 4.45*   HEMOGLOBIN g/dL 11.3* 11.9  13.3   HEMATOCRIT % 35.4 36.9 39.7   PLATELETS 10*3/mm3 139* 150 148   INR   --   --  1.05       Results from last 7 days  Lab Units 06/06/18  0636 06/05/18  1515 06/04/18  1238   SODIUM mmol/L 142 143 140   POTASSIUM mmol/L 4.1 3.6 3.9   CHLORIDE mmol/L 112* 115* 110   CO2 mmol/L 26.0 23.0 24.7   BUN mg/dL 9 6* 7   CREATININE mg/dL 0.63 0.66 0.66   GLUCOSE mg/dL 110* 88 102   CALCIUM mg/dL 8.8 9.7 9.4   ALT (SGPT) U/L 61* 73* 81*   AST (SGOT) U/L 33 42* 64*   TROPONIN I ng/mL <0.006 <0.006 <0.006     BNP   Date Value Ref Range Status   06/05/2018 27.0 0.0 - 100.0 pg/mL Final     Comment:     Results may be falsely decreased if patient taking Biotin.     No results found for: PHART    Microbiology Results Abnormal     None          Imaging Results (last 24 hours)     Procedure Component Value Units Date/Time    XR Chest 1 View [[id number]] Collected:  06/06/18 0846     Updated:  06/06/18 0847    Narrative:       EXAMINATION:  XR CHEST ONE VIEW - 06/05/2018     INDICATION:  Chest pain     TECHNIQUE:  Single view frontal chest.     COMPARISONS: None.     FINDINGS:  Lungs are without focal abnormality. No pleural effusion or  pneumothorax. Cardiomediastinal silhouette is within normal limits.  Right chest Mediport in place.       Impression:       No acute cardiopulmonary abnormality.     D:  06/05/2018  E:  06/06/2018                I have reviewed the medications.    Assessment/Plan   Assessment / Plan     Hospital Problem List     * (Principal)Precordial pain    Intractable headache    Anxiety    Benign brain tumor    Fibromyalgia    Bipolar 1 disorder    Crohn disease           Brief Hospital Course to date:  Susanna Camilo is a 43 y.o. female with history of bipolar disorder, anxiety disorder, depressive disorder, migraine disorder, fibromyalgia, Crohn's, IgA nephropathy, hepatitis C, severe urethral stenosis who presents to emergency department with complaints of chest pain.     Assessment & Plan:  - Atypical CP:  Stress test today. Likely non-cardiac  - L sided rib pain: Reproducible and pleuritic. Lidocaine patch  - Anxiety: Continue Buspirone TID  - Migraine: Depakote, Topamax, Stadol q12hrs PRN. Questionable response to [facility] during previous admissions.    DVT Prophylaxis:  pLOV    CODE STATUS: Full Code    Disposition: I expect the patient to be discharged depending on stress test results and migraine control    Sangeeta Virgen MD  06/06/18  10:25 AM

## 2018-06-06 NOTE — PLAN OF CARE
Problem: Patient Care Overview  Goal: Plan of Care Review  Outcome: Ongoing (interventions implemented as appropriate)   06/06/18 0514   Coping/Psychosocial   Plan of Care Reviewed With patient   Plan of Care Review   Progress improving   OTHER   Outcome Summary admitted from the ED. Room air, sinus rhythm. pt rested on and off through the night. stress test today.      Goal: Individualization and Mutuality  Outcome: Ongoing (interventions implemented as appropriate)    Goal: Discharge Needs Assessment  Outcome: Ongoing (interventions implemented as appropriate)      Problem: Infection, Risk/Actual (Adult)  Goal: Identify Related Risk Factors and Signs and Symptoms  Outcome: Ongoing (interventions implemented as appropriate)    Goal: Infection Prevention/Resolution  Outcome: Ongoing (interventions implemented as appropriate)

## 2018-06-06 NOTE — PAYOR COMM NOTE
"Jes Darden (43 y.o. Female)     Date of Birth Social Security Number Address Home Phone MRN    1975  [address] [place] [address] [phone] [id number]    Muslim Marital Status          None        Admission Date Admission Type Admitting Provider Attending Provider Department, Room/Bed    18 Emergency Val Mejias MD Krill, Kaleigh, MD [address], [address]/1    Discharge Date Discharge Disposition Discharge Destination                       Attending Provider:  Sangeeta Virgen MD    Allergies:  Ativan [Lorazepam], Sulfa Antibiotics, Compazine [Prochlorperazine Edisylate], Demerol [Meperidine], Droperidol, Metoclopramide, Toradol [Ketorolac Tromethamine], Zofran [Ondansetron Hcl]    Isolation:  None   Infection:  None   Code Status:  FULL    Ht:  175.3 cm (69.02\")   Wt:  64 kg (141 lb 1.5 oz)    Admission Cmt:  None   Principal Problem:  Precordial pain [R07.2]                 Active Insurance as of 2018     Primary Coverage     Payor Plan Insurance Group Employer/Plan Group    [employer] [place] [facility] [employer] [place]      Payor Plan Address Payor Plan Phone Number Effective From Effective To    [address] [address]  2015     [employer] [place] [id number]       Subscriber Name Subscriber Birth Date Member ID       JES DARDEN 1975 [id number]                 Emergency Contacts      (Rel.) Home Phone Work Phone Mobile Phone    Rosangela Cardozarick (Spouse) -- -- [phone]               History & Physical      Val DENNEY MD at 2018  6:09 PM              [facility]  HISTORY AND PHYSICAL    Patient Name: Jes Darden  : 1975  MRN: [id number]  Primary Care Physician: MIGDALIA Gooden    Subjective   Subjective     Chief Complaint:  Chest pain    HPI:  Jes Darden is a 43 y.o. female with history of bipolar disorder, anxiety disorder, depressive disorder, migraine disorder, " fibromyalgia, Crohn's who presents to emergency department with complaints of chest pain.  Chest pain started yesterday at rest.  She also experienced palpitations.  She describes chest pain as squeezing.  Went to [place] ER last night where she was told she likely had pleurisy.  Her d-dimer was borderline elevated.  CT pulmonary embolism with contrast was negative.  She was discharged home.  Chest pain persisted today and left arm numbness accompanying it so she presented to the emergency department.  Troponins have been negative.  EKG unremarkable.  Cardiology evaluated patient.  They think she would benefit from a stress test.  Patient developed migraine headache after nitroglycerin.  Cardiology S hospitalist service to admit due to migraine headache.  She received dexamethasone and Stadol in the emergency department.  Of note she was admitted for 7 days back in March for DHE protocol.  When reviewing her chart looks like she is in the emergency department several times per month for the past several months.    Currently she is still experiencing chest pain in her left lower breast area.  It is constant.  She has a migraine headache.  No blurry vision.  She is mildly nauseated.  No abdominal pain.  She is anxious.  She's been under worsening stress lately.  She works as a  and tells me her job is very stressful.  She is also having family issues with her sister which is leading to increased stress and anxiety.  She does have bipolar disorder and has been under control with medications.      Review of Systems     Otherwise 10-system ROS reviewed and is negative except as mentioned in the HPI.    Personal History     Past Medical History:   Diagnosis Date   • Abdominal pain    • Abdominal swelling    • Hoyos's disease    • Bipolar 1 disorder    • Brain tumor     R Frontal Lobe per pt   • Brain tumor 2014   • Brain tumor (benign)    • Constipation    • DDD (degenerative disc disease), cervical 05/29/2017   •  DDD (degenerative disc disease), cervical    • Diarrhea    • Fibromyalgia    • IBS (irritable bowel syndrome)    • Migraine    • Nausea & vomiting    • PONV (postoperative nausea and vomiting)    • PTSD (post-traumatic stress disorder)    • Rectal bleeding        Past Surgical History:   Procedure Laterality Date   • ANAL SCOPE N/A 7/28/2016    Procedure: ANAL SCOPE;  Surgeon: Kael Lopez MD;  Location: [facility] OR;  Service:    • APPENDECTOMY     • COLONOSCOPY N/A 6/30/2016    Procedure: COLONOSCOPY  CPTCODE:51329;  Surgeon: Jose Antonio Belle III, MD;  Location: [facility] OR;  Service:    • COLONOSCOPY N/A 7/7/2016    Procedure: COLONOSCOPY (30552) CPT;  Surgeon: Jose Antonio Belle III, MD;  Location: [facility] OR;  Service:    • CYSTOSCOPY RETROGRADE PYELOGRAM Bilateral 4/28/2017    Procedure: CYSTOSCOPY RETROGRADE PYELOGRAM;  Surgeon: Mu Blunt MD;  Location: [facility] OR;  Service:    • ENDOSCOPY N/A 6/30/2016    Procedure: ESOPHAGOGASTRODUODENOSCOPY WITH BIOPSY  CPTCODE:50664;  Surgeon: Jose Antonio Belle III, MD;  Location: [facility] OR;  Service:    • HEMORRHOIDECTOMY N/A 7/28/2016    Procedure: HEMORRHOID STAPLING;  Surgeon: Kael Lopez MD;  Location: [facility] OR;  Service:    • HYSTERECTOMY     • KNEE SURGERY     • LAPAROSCOPIC SALPINGOOPHERECTOMY     • PORTACATH PLACEMENT N/A 7/28/2017    Procedure: INSERTION OF PORTACATH;  Surgeon: Celso Arredondo MD;  Location: [facility] OR;  Service:    • SHOULDER SURGERY      3 times       Family History: family history includes Crohn's disease in her other; Diabetes in her other; Hypertension in her other; Irritable bowel syndrome in her other.     Social History:  reports that she quit smoking about 2 years ago. She has a 20.00 pack-year smoking history. She has never used smokeless tobacco. She reports that she uses drugs, including Marijuana. She reports that she does not drink alcohol.  Social History     Social History Narrative    L[facility] in [facility]  works as a , independent of [employer]       Medications:    (Not in a hospital admission)    Allergies   Allergen Reactions   • Ativan [Lorazepam] Hallucinations     confusion   • Sulfa Antibiotics Shortness Of Breath and Swelling   • Compazine [Prochlorperazine Edisylate] Hives   • Demerol [Meperidine] Hives   • Droperidol Itching   • Metoclopramide Swelling   • Toradol [Ketorolac Tromethamine] Hives and Itching   • Zofran [Ondansetron Hcl] Rash       Objective   Objective     Vital Signs:   Temp:  [98.1 °F (36.7 °C)] 98.1 °F (36.7 °C)  Heart Rate:  [64-90] 79  Resp:  [20] 20  BP: ()/(63-93) 107/82        Physical Exam   Constitutional: No acute distress, awake, alert  Eyes: PERRLA, sclerae anicteric, no conjunctival injection  HENT: NCAT, mucous membranes moist  Neck: Supple, trachea midline  Respiratory: Clear to auscultation bilaterally, nonlabored respirations   Cardiovascular: RRR, no murmurs, rubs, or gallops, palpable pedal pulses bilaterally  Gastrointestinal: Positive bowel sounds, soft, nontender, nondistended  Musculoskeletal: No bilateral ankle edema, no clubbing or cyanosis to extremities  Psychiatric: She is anxious and irritable  Neurologic: Oriented x 3, strength symmetric in all extremities,  speech clear  Skin: No rashes    Results Reviewed:  I have personally reviewed current lab, radiology, and data and agree.      Results from last 7 days  Lab Units 06/05/18  1627 06/04/18  1238   WBC 10*3/mm3 10.67 4.45*   HEMOGLOBIN g/dL 11.9 13.3   HEMATOCRIT % 36.9 39.7   PLATELETS 10*3/mm3 150 148   INR   --  1.05       Results from last 7 days  Lab Units 06/05/18  1515 06/04/18  1238   SODIUM mmol/L 143 140   POTASSIUM mmol/L 3.6 3.9   CHLORIDE mmol/L 115* 110   CO2 mmol/L 23.0 24.7   BUN mg/dL 6* 7   CREATININE mg/dL 0.66 0.66   GLUCOSE mg/dL 88 102   CALCIUM mg/dL 9.7 9.4   ALT (SGPT) U/L 73* 81*   AST (SGOT) U/L 42* 64*   TROPONIN I ng/mL <0.006 <0.006     Estimated Creatinine Clearance: 128.2  mL/min (by C-G formula based on SCr of 0.66 mg/dL).  Brief Urine Lab Results  (Last result in the past 365 days)      Color   Clarity   Blood   Leuk Est   Nitrite   Protein   CREAT   Urine HCG        06/05/18 1736 Yellow Clear Negative Negative Negative Negative             BNP   Date Value Ref Range Status   06/05/2018 27.0 0.0 - 100.0 pg/mL Final     Comment:     Results may be falsely decreased if patient taking Biotin.       Imaging Results (last 24 hours)     Procedure Component Value Units Date/Time    XR Chest 1 View [[id number]] Updated:  06/05/18 1606             Assessment/Plan   Assessment / Plan     Hospital Problem List     * (Principal)Precordial pain    Intractable headache    Anxiety    Benign brain tumor    Fibromyalgia    Bipolar 1 disorder    Crohn disease            Assessment & Plan:  --Dr. Kee has seen, he has ordered stress ECHO for in the morning, we will make her NPO after MN  --trial 100% O2 for 15 minutes to try to break headache, will also give gentle IVF to help hydrate her  --avoid IV narcotics  --restart home meds as appropriate  --Long discussion about mood and anxiety with her. I do think underlying psychiatric disorder is playing a part here. She is in the ED at [place] several times a month for the past 9 months for various complaints  --??drug seeking, Romeo reviewed, she has frequent fills of Norco but chronic narcotics do not seem indicated when reviewing her medical history    DVT prophylaxis:     CODE STATUS:  Full Code    Admission Status:  I believe this patient meets OBSERVATION status, however if further evaluation or treatment plans warrant, status may change.  Based upon current information, I predict patient's care encounter to be less than or equal to 2 midnights.      Electronically signed by MIGDALIA Tesfaye, 06/05/18, 6:10 PM.      Brief Attending Admission Attestation     I have seen and examined the patient, performing an independent face-to-face  diagnostic evaluation with plan of care reviewed and developed with the advanced practice clinician (APC).      Brief Summary Statement/HPI:   Susanna Camilo is a 43 y.o. female with hx of bipolar disorder, chronic migraines, anxiety, depression, fibromyalgia, and Crohn's disease presents to the ER due to chest pain and palpitations associated with SOA. Also has some nausea and diaphoresis. In addition she complains of migraine headache 8/10 in pain, which she thinks was worsened by the nitro she took. Due to her cardiac symptoms, Cardiology evaluated the patient. Recommended stress Echo in the morning. Hospitalists subsequently asked to admit for her other complaints including migraine headache. She was seen at [place] ER yesterday and had a negative workup with unremarkable CTA chest and troponins. Told she had pleurisy and was sent home. Patient reports increased life and work stressors.       Attending Physical Exam:  Constitutional: No acute distress, awake, alert  Eyes: PERRLA, sclerae anicteric, no conjunctival injection  HENT: NCAT, mucous membranes moist  Neck: Supple, no thyromegaly, no lymphadenopathy, trachea midline  Respiratory: Clear to auscultation bilaterally, nonlabored respirations   Cardiovascular: RRR, no murmurs, rubs, or gallops, palpable pedal pulses bilaterally  Gastrointestinal: Positive bowel sounds, soft, nontender, nondistended  Musculoskeletal: No bilateral ankle edema, no clubbing or cyanosis to extremities  Psychiatric: Appropriate affect, cooperative, anxious and rolling forward in the bed rubbing her head  Neurologic: Oriented x 3, strength symmetric in all extremities, Cranial Nerves grossly intact to confrontation, speech clear  Skin: No rashes        Brief Assessment/Plan :  See above for further detailed assessment and plan developed with APC which I have reviewed and/or edited.    44 yo F with hx of bipolar disorder, chronic migraines, anxiety, depression, fibromyalgia, and  Crohn's disease presents to the ER due to chest pain and palpitations associated with SOA. In addition she complains of a migraine headache which was worsened by taking nitroglycerin.    PLAN:  --NPO after midnight for stress test in AM. Cardiology to follow up.  --Supportive care for migraine. DHE protocol contraindicated with her chest pain. Consider Neurology consult in AM if no better.   --If stress test unremarkable, I did discuss with patient and  that her stress and anxiety may be playing a role in her symptoms. Needs close PCP follow up.   --Avoid IV narcotics for migraine treatment. LUIS ALBERTO reviewed.     Electronically signed by Val Mejias MD, 06/05/18, 7:07 PM               Electronically signed by Val DENNEY MD at 6/5/2018  7:07 PM       Hospital Medications (active)       Dose Frequency Start End    acetaminophen (TYLENOL) tablet 650 mg 650 mg Every 4 Hours PRN 6/5/2018     Sig - Route: Take 2 tablets by mouth Every 4 (Four) Hours As Needed for Mild Pain . - Oral    aluminum-magnesium hydroxide-simethicone (MAALOX MAX) 400-400-40 MG/5ML suspension 15 mL 15 mL Every 6 Hours PRN 6/5/2018     Sig - Route: Take 15 mL by mouth Every 6 (Six) Hours As Needed for Heartburn. - Oral    aspirin chewable tablet 324 mg 324 mg Once 6/5/2018     Sig - Route: Chew 4 tablets 1 (One) Time. - Oral    Cosign for Ordering: Accepted by Zachary Bran MD on 6/5/2018  4:14 PM    busPIRone (BUSPAR) tablet 15 mg 15 mg 3 Times Daily 6/5/2018     Sig - Route: Take 1 tablet by mouth 3 (Three) Times a Day. - Oral    butorphanol (STADOL) injection 1 mg 1 mg Once 6/5/2018 6/5/2018    Sig - Route: Infuse 1 mL into a venous catheter 1 (One) Time. - Intravenous    butorphanol (STADOL) injection 1 mg 1 mg Once 6/5/2018 6/5/2018    Sig - Route: Infuse 1 mL into a venous catheter 1 (One) Time. - Intravenous    butorphanol (STADOL) injection 1 mg 1 mg Once 6/6/2018 6/6/2018    Sig - Route: Infuse 1 mL into a venous catheter  1 (One) Time. - Intravenous    butorphanol (STADOL) injection 2 mg 2 mg Every 4 Hours PRN 6/6/2018     Sig - Route: Infuse 2 mL into a venous catheter Every 4 (Four) Hours As Needed for Severe Pain . - Intravenous    butorphanol (STADOL) injection 2 mg 2 mg Every 4 Hours PRN 6/6/2018     Sig - Route: Infuse 2 mL into a venous catheter Every 4 (Four) Hours As Needed for Moderate Pain  or Severe Pain  (headache). - Intravenous    dexamethasone (DECADRON) IVPB 10 mg 10 mg Once 6/5/2018 6/5/2018    Sig - Route: Infuse 50 mL into a venous catheter 1 (One) Time. - Intravenous    dihydroergotamine (DHE) injection 0.5 mg 0.5 mg Every 8 Hours 6/6/2018 6/9/2018    Sig - Route: Infuse 0.5 mL into a venous catheter Every 8 (Eight) Hours. - Intravenous    diphenhydrAMINE (BENADRYL) injection 25 mg 25 mg Once 6/5/2018 6/5/2018    Sig - Route: Infuse 0.5 mL into a venous catheter 1 (One) Time. - Intravenous    enoxaparin (LOVENOX) syringe 40 mg 40 mg Daily 6/6/2018     Sig - Route: Inject 0.4 mL under the skin Daily. - Subcutaneous    lidocaine (LIDODERM) 5 % 1 patch 1 patch Every 24 Hours Scheduled 6/6/2018     Sig - Route: Place 1 patch on the skin Daily. - Transdermal    Morphine sulfate (PF) injection 1 mg 1 mg Once 6/6/2018 6/6/2018    Sig - Route: Infuse 0.5 mL into a venous catheter 1 (One) Time. - Intravenous    Cosign for Ordering: Required by Val DENNEY MD    pantoprazole (PROTONIX) EC tablet 40 mg 40 mg Every Early Morning 6/6/2018     Sig - Route: Take 1 tablet by mouth Every Morning. - Oral    promethazine (PHENERGAN) injection 12.5 mg 12.5 mg Once 6/5/2018 6/5/2018    Sig - Route: Infuse 0.5 mL into a venous catheter 1 (One) Time. - Intravenous    promethazine (PHENERGAN) injection 12.5 mg 12.5 mg Once 6/6/2018 6/6/2018    Sig - Route: Infuse 0.5 mL into a venous catheter 1 (One) Time. - Intravenous    promethazine (PHENERGAN) injection 12.5 mg 12.5 mg Every 6 Hours 6/6/2018     Sig - Route: Infuse 0.5 mL into a  venous catheter Every 6 (Six) Hours. - Intravenous    sertraline (ZOLOFT) tablet 100 mg 100 mg 2 Times Daily 6/5/2018     Sig - Route: Take 1 tablet by mouth 2 (Two) Times a Day. - Oral    sodium chloride 0.9 % flush 1-10 mL 1-10 mL As Needed 6/5/2018     Sig - Route: Infuse 1-10 mL into a venous catheter As Needed for Line Care. - Intravenous    sodium chloride 0.9 % flush 10 mL 10 mL As Needed 6/5/2018     Sig - Route: Infuse 10 mL into a venous catheter As Needed for Line Care. - Intravenous    Cosign for Ordering: Accepted by Zachary Bran MD on 6/5/2018  4:14 PM    sodium chloride 0.9 % infusion 75 mL/hr Continuous 6/5/2018 6/6/2018    Sig - Route: Infuse 75 mL/hr into a venous catheter Continuous. - Intravenous    Sulfur Hexafluoride Microsph 60.7-25 MG reconstituted suspension 5 mL 5 mL Once 6/6/2018 6/6/2018    Sig - Route: Infuse 5 mL into a venous catheter 1 (One) Time. - Intravenous    topiramate (TOPAMAX) capsule 50 mg 50 mg Every 12 Hours Scheduled 6/6/2018     Sig - Route: Take 2 capsules by mouth Every 12 (Twelve) Hours. - Oral    valproate (DEPACON) 500 mg in sodium chloride 0.9 % 50 mL IVPB 500 mg Every 8 Hours 6/6/2018     Sig - Route: Infuse 500 mg into a venous catheter Every 8 (Eight) Hours. - Intravenous    butorphanol (STADOL) injection 1 mg (Discontinued) 1 mg Every 12 Hours PRN 6/6/2018 6/6/2018    Sig - Route: Infuse 1 mL into a venous catheter Every 12 (Twelve) Hours As Needed for Severe Pain . - Intravenous    divalproex (DEPAKOTE) DR tablet 250 mg (Discontinued) 250 mg 3 Times Daily 6/5/2018 6/6/2018    Sig - Route: Take 1 tablet by mouth 3 (Three) Times a Day. - Oral    promethazine (PHENERGAN) tablet 25 mg (Discontinued) 25 mg Every 6 Hours PRN 6/5/2018 6/6/2018    Sig - Route: Take 1 tablet by mouth Every 6 (Six) Hours As Needed for Nausea or Vomiting. - Oral    topiramate (TOPAMAX) capsule 25 mg (Discontinued) 25 mg Every 12 Hours Scheduled 6/5/2018 6/6/2018    Sig - Route: Take 1  capsule by mouth Every 12 (Twelve) Hours. - Oral             Physician Progress Notes (all)      Sangeeta Virgen MD at 2018  8:51 AM              [facility] Medicine Services  PROGRESS NOTE    Patient Name: Ssuanna Camilo  : 1975  MRN: [id number]    Date of Admission: 2018  Length of Stay: 0  Primary Care Physician: MIGDALIA Gooden    Subjective   Subjective     CC:  FU CP    HPI:  Migraines worsened by nitro. Similar to her previous migraines. Threw up all of her PO meds. Not currently having CP. L sided sharp, reproducible pin-point rib that is pleuritic as well. Coughing for the past 3 weeks since having influenza. Not worse. Having seasonal allergies as well.  at bedside    Review of Systems  Gen- No fevers, chills  CV- No chest pain, palpitations  Resp- No cough, dyspnea  GI- No N/V/D, abd pain    Otherwise ROS is negative except as mentioned in the HPI.    Objective   Objective     Vital Signs:   Temp:  [97.5 °F (36.4 °C)-98.2 °F (36.8 °C)] 97.5 °F (36.4 °C)  Heart Rate:  [51-90] 63  Resp:  [16-20] 18  BP: ()/(51-93) 110/88        Physical Exam:  Constitutional: No acute distress, awake, alert on RA  Eyes: PERRLA, sclerae anicteric, no conjunctival injection  HENT: NCAT, mucous membranes moist  Neck: Supple, no thyromegaly, no lymphadenopathy, trachea midline  Respiratory: Clear to auscultation bilaterally, nonlabored respirations   Cardiovascular: RRR, no murmurs, rubs, or gallops, palpable pedal pulses bilaterally  Gastrointestinal: Positive bowel sounds, soft, nontender, nondistended  Musculoskeletal: No bilateral ankle edema, no clubbing or cyanosis to extremities  Psychiatric: Appropriate affect, cooperative  Neurologic: Oriented x 3, strength symmetric in all extremities, Cranial Nerves grossly intact to confrontation, speech clear  Skin: No rashes    Results Reviewed:  I have personally reviewed current lab, radiology, and data and  agree.      Results from last 7 days  Lab Units 06/06/18  0636 06/05/18  1627 06/04/18  1238   WBC 10*3/mm3 8.61 10.67 4.45*   HEMOGLOBIN g/dL 11.3* 11.9 13.3   HEMATOCRIT % 35.4 36.9 39.7   PLATELETS 10*3/mm3 139* 150 148   INR   --   --  1.05       Results from last 7 days  Lab Units 06/06/18  0636 06/05/18  1515 06/04/18  1238   SODIUM mmol/L 142 143 140   POTASSIUM mmol/L 4.1 3.6 3.9   CHLORIDE mmol/L 112* 115* 110   CO2 mmol/L 26.0 23.0 24.7   BUN mg/dL 9 6* 7   CREATININE mg/dL 0.63 0.66 0.66   GLUCOSE mg/dL 110* 88 102   CALCIUM mg/dL 8.8 9.7 9.4   ALT (SGPT) U/L 61* 73* 81*   AST (SGOT) U/L 33 42* 64*   TROPONIN I ng/mL <0.006 <0.006 <0.006     BNP   Date Value Ref Range Status   06/05/2018 27.0 0.0 - 100.0 pg/mL Final     Comment:     Results may be falsely decreased if patient taking Biotin.     No results found for: PHART    Microbiology Results Abnormal     None          Imaging Results (last 24 hours)     Procedure Component Value Units Date/Time    XR Chest 1 View [[id number]] Collected:  06/06/18 0846     Updated:  06/06/18 0847    Narrative:       EXAMINATION:  XR CHEST ONE VIEW - 06/05/2018     INDICATION:  Chest pain     TECHNIQUE:  Single view frontal chest.     COMPARISONS: None.     FINDINGS:  Lungs are without focal abnormality. No pleural effusion or  pneumothorax. Cardiomediastinal silhouette is within normal limits.  Right chest Mediport in place.       Impression:       No acute cardiopulmonary abnormality.     D:  06/05/2018  E:  06/06/2018                I have reviewed the medications.    Assessment/Plan   Assessment / Plan     Hospital Problem List     * (Principal)Precordial pain    Intractable headache    Anxiety    Benign brain tumor    Fibromyalgia    Bipolar 1 disorder    Crohn disease           Brief Hospital Course to date:  Susanna Camilo is a 43 y.o. female with history of bipolar disorder, anxiety disorder, depressive disorder, migraine disorder, fibromyalgia, Crohn's, IgA  nephropathy, hepatitis C, severe urethral stenosis who presents to emergency department with complaints of chest pain.     Assessment & Plan:  - Atypical CP: Stress test today. Likely non-cardiac  - L sided rib pain: Reproducible and pleuritic. Lidocaine patch  - Anxiety: Continue Buspirone TID  - Migraine: Depakote, Topamax, Stadol q12hrs PRN. Questionable response to DHE during previous admissions.    DVT Prophylaxis:  pLOV    CODE STATUS: Full Code    Disposition: I expect the patient to be discharged depending on stress test results and migraine control    Sangeeta Virgen MD  06/06/18  10:25 AM            Electronically signed by Sangeeta Virgen MD at 6/6/2018 10:35 AM          Consult Notes (all)      Ml Ybarra MD at 6/6/2018  1:59 PM      Consult Orders:    1. Inpatient Neurology Consult [[id number]] ordered by Sangeeta Virgen MD at 06/06/18 1334                Neurology    Referring Provider: Dr. Virgen    Reason for Consultation: migraine      Chief complaint: chest pain    Subjective .     History of present illness:  Mrs. Camilo is an interesting 43-year-old female with a history of bipolar disorder, anxiety disorder, depressive disorder, fibromyalgia, migraines, Crohn's disease who is admitted to the hospital service for reported chest pain.  Per family she was having some chest pain this started at rest had some palpitations.  She is taking some nitroglycerin at home and then suddenly developed an exacerbation of her migraines.  Of note she was recently admitted to [facility] in March 2018 for status migrainosus and was seen by Dr. Key at that time.  She reports that interventions that help her migraine have included DHE, Stadol, and IV Phenergan.  Cardiology has evaluated her and a stress test which was reportedly unremarkable.  Neurology was consulted for assistance with migraines.    Review of Systems: Positive for headache and chest pain.Otherwise complete review of systems was discussed with the  patient and found to be negative except for that mentioned in history of present illness.    History  Past Medical History:   Diagnosis Date   • Abdominal pain    • Abdominal swelling    • Hoyos's disease    • Bipolar 1 disorder    • Brain tumor     R Frontal Lobe per pt   • Brain tumor 2014   • Brain tumor (benign)    • Constipation    • DDD (degenerative disc disease), cervical 05/29/2017   • DDD (degenerative disc disease), cervical    • Diarrhea    • Fibromyalgia    • IBS (irritable bowel syndrome)    • Migraine    • Nausea & vomiting    • PONV (postoperative nausea and vomiting)    • PTSD (post-traumatic stress disorder)    • Rectal bleeding    ,   Past Surgical History:   Procedure Laterality Date   • ANAL SCOPE N/A 7/28/2016    Procedure: ANAL SCOPE;  Surgeon: Kael Lopez MD;  Location: [facility] OR;  Service:    • APPENDECTOMY     • COLONOSCOPY N/A 6/30/2016    Procedure: COLONOSCOPY  CPTCODE:02726;  Surgeon: Jose Antonio Belle III, MD;  Location: [facility] OR;  Service:    • COLONOSCOPY N/A 7/7/2016    Procedure: COLONOSCOPY (01169) CPT;  Surgeon: Jose Antonio Belle III, MD;  Location: [facility] OR;  Service:    • CYSTOSCOPY RETROGRADE PYELOGRAM Bilateral 4/28/2017    Procedure: CYSTOSCOPY RETROGRADE PYELOGRAM;  Surgeon: Mu Blunt MD;  Location: [facility] OR;  Service:    • ENDOSCOPY N/A 6/30/2016    Procedure: ESOPHAGOGASTRODUODENOSCOPY WITH BIOPSY  CPTCODE:53843;  Surgeon: Jose Antonio Belle III, MD;  Location: [facility] OR;  Service:    • HEMORRHOIDECTOMY N/A 7/28/2016    Procedure: HEMORRHOID STAPLING;  Surgeon: Kael Lopez MD;  Location: [facility] OR;  Service:    • HYSTERECTOMY     • KNEE SURGERY     • LAPAROSCOPIC SALPINGOOPHERECTOMY     • PORTACATH PLACEMENT N/A 7/28/2017    Procedure: INSERTION OF PORTACATH;  Surgeon: Celso Arredondo MD;  Location: [facility] OR;  Service:    • SHOULDER SURGERY      3 times   ,   Family History   Problem Relation Age of Onset   • Crohn's disease Other    •  Hypertension Other    • Diabetes Other    • Irritable bowel syndrome Other    ,   Social History   Substance Use Topics   • Smoking status: Former Smoker     Packs/day: 1.00     Years: 20.00     Quit date: 11/1/2015   • Smokeless tobacco: Never Used   • Alcohol use No   ,   Facility-Administered Medications Prior to Admission   Medication Dose Route Frequency Provider Last Rate Last Dose   • Testosterone Cypionate (DEPOTESTOTERONE CYPIONATE) injection 100 mg  100 mg Intramuscular Once Mu Blunt MD         Prescriptions Prior to Admission   Medication Sig Dispense Refill Last Dose   • albuterol (PROVENTIL HFA;VENTOLIN HFA) 108 (90 Base) MCG/ACT inhaler Inhale 2 puffs 2 (Two) Times a Day.      • Azelastine HCl 137 MCG/SPRAY solution 1 spray into each nostril As Needed (Allergies).      • busPIRone (BUSPAR) 15 MG tablet Take 15 mg by mouth 3 (three) times a day      Taking   • butorphanol (STADOL) 10 MG/ML nasal spray 1 spray into each nostril Daily.   Taking   • dicyclomine (BENTYL) 10 MG capsule Take 10 mg by mouth 3 (Three) Times a Day.      • divalproex (DEPAKOTE) 250 MG DR tablet Take 250 mg by mouth 3 (Three) Times a Day.   Taking   • fluticasone (VERAMYST) 27.5 MCG/SPRAY nasal spray 2 sprays into each nostril Daily.      • HYDROcodone-acetaminophen (NORCO) 7.5-325 MG per tablet Take 1 tablet by mouth 2 (Two) Times a Day As Needed for Moderate Pain .      • montelukast (SINGULAIR) 10 MG tablet Take 10 mg by mouth Every Night.      • ondansetron (ZOFRAN) 4 MG tablet Take 4 mg by mouth Every 8 (Eight) Hours As Needed for Nausea or Vomiting.      • pantoprazole (PROTONIX) 40 MG EC tablet Take 40 mg by mouth 2 (Two) Times a Day As Needed.   Taking   • phenazopyridine (PYRIDIUM) 100 MG tablet Take 100 mg by mouth 3 (Three) Times a Day As Needed for bladder spasms.      • potassium chloride (K-DUR,KLOR-CON) 10 MEQ CR tablet Take 10 mEq by mouth Daily.      • sertraline (ZOLOFT) 100 MG tablet Take 100 mg by  "mouth 2 (Two) Times a Day.   Taking   • SUMAtriptan (IMITREX) 6 MG/0.5ML solution injection Inject 6 mg under the skin 1 (One) Time.   Taking   • topiramate (TOPAMAX) 25 MG capsule Take 25 mg by mouth 3 (Three) Times a Day.      • cetirizine (zyrTEC) 10 MG tablet Take 1 tablet by mouth Daily. 30 tablet 0 Taking   • doxycycline (MONODOX) 100 MG capsule Take 1 capsule by mouth 2 (Two) Times a Day. 20 capsule 0     and Allergies:  Ativan [lorazepam]; Sulfa antibiotics; Compazine [prochlorperazine edisylate]; Demerol [meperidine]; Droperidol; Metoclopramide; Toradol [ketorolac tromethamine]; and Zofran [ondansetron hcl]    Objective     Vital Signs   Blood pressure 110/88, pulse 63, temperature 97.5 °F (36.4 °C), temperature source Oral, resp. rate 18, height 175.3 cm (69.02\"), weight 64 kg (141 lb 1.5 oz), SpO2 95 %, not currently breastfeeding.    Physical Exam:      Gen: Sitting up in bed with eyes open.  In mild distress   Eyes: PERRL, conjuntivae/lids normal   ENT: External canals normal bilaterally. Oropharynx normal.    Neck: Supple. No thyroid enlargement noted   Respiratory: CTA bilaterally. Respirations unlabored   CV: RRR, S1 and S2 nml. Radial pulses 2+ bilaterally.    Skin: No rashes noted on exposed skin. Normal tugor.   MSK: Normal bulk and tone. Nml ROM     Neurologic:   Mental status: Awake, alert, oriented x4. Follows commands. Speech fluent.    CN: PERRL, EOM intact, sensation intact in upper/mid/lower face bilaterally, facial movements symmetric, hearing intact to finger rub bilaterally, palate elevates symmetrically, tongue movements and SCMs strong bilaterally    Motor: Strength full (5/5) throughout in BUE and BLE    Reflexes: 2+ throughout    Sensory: Intact to LT throughout   Coordination: No dysmetria noted   Gait: Not tested        Results Reviewed:     Labs reviewed   Stress echo report reviewed                Assessment/Plan     1.  Migraine = will reinitiate trials used by Dr. Key " during previous admission. Stadol 2mg IV Q4hr prn, DHE 0.5mg Q8 hours x3 days, promethazine 12.5mg Q6 hours. Will schedule her antiemetic and hold if nausea improves. Will also change her Depakote to IV Depacon 500mg Q8 hours for abortive relief. Patient expressed gratitude for these changes and asked for Dr. Key to assume consultative care tomorrow. She also requests referral to outpatient neuro clinic with Dr. Schwab for migraine management.        Ml Ybarra MD  06/06/18  1:59 PM              Electronically signed by Ml Ybarra MD at 6/6/2018  2:09 PM     Alvin Kee III, MD at 6/5/2018  4:37 PM          [facility]  Consultation H&P    Patient: Susanna Camilo  1975  [phone]      PCP:  MIGDALIA Gooden   Treatment Team:   Attending Provider: Zachary Bran MD   6/5/2018      DATE OF CONSULTATION: 6/5/2018 4:38 PM     REASON FOR CONSULTATION: Chest pain    ASSESSMENT/PLAN:  Recurrent atypical angina: Negative serial troponins, low risk EKG, CT chest negative.    Trend serial cardiac troponins  Stress echocardiogram tomorrow  No further pharmacologic intervention recommended from a cardiac standpoint    History of Present Illness   43-year-old female with a history of bipolar disorder, generalized anxiety, migraine headaches, IgA nephropathy, hepatitis C, severe urethral stenosis presenting with recurrent severe chest discomfort is been evaluated in the emergency room setting multiple times for these issues over the previous 2 months.  Describes precordial chest pressure and discomfort.  Intermittently sharp in nature and localized however does report feeling as if someone were squeezing her heart in the palm of her hand.  No obvious triggers.  No alleviating or exacerbating factors.  Some associated palpitations that go along with her pain syndrome.  This does appear to flareup her generalized anxiety.  She appears to be a very anxious individual  "during her interview today.  D-dimer slightly elevated however recent CT angiogram the chest was negative for acute pathology.  Serial troponins negative.  Low risk twelve-lead EKG.  She had previously been scheduled for myocardial perfusion imaging in May 2017 however did not proceed with this as she reports that her primary care physician felt that perfusion imaging would not be safe for her.  Was scheduled for a regular treadmill test in January of this year however that was delayed after a fall resulted in a left clavicular fracture which is now healed.  Currently requesting pain medications for her chest pain and feels like she is developing a migraine after receiving sublingual nitroglycerin.    OBJECTIVE:  Vitals:    06/05/18 1502 06/05/18 1525 06/05/18 1527   BP: 119/63 112/78    BP Location: Left arm     Patient Position: Sitting     Pulse: 90  66   Resp: 20     Temp: 98.1 °F (36.7 °C)     TempSrc: Oral     SpO2: 93%  100%   Weight: 73.9 kg (163 lb)     Height: 175.3 cm (69\")       No intake/output data recorded.  No intake/output data recorded.  Intake & Output (last 3 days)     None           PHYSICAL EXAMINATION:    General Appearance:    Alert, cooperative, no distress, appears stated age   Head:    Normocephalic, without obvious abnormality, atraumatic   Eyes:    PERRL, conjunctiva/corneas clear, EOM's intact, fundi     benign, both eyes   Ears:    Normal TM's and external ear canals, both ears   Nose:   Nares normal, septum midline, mucosa normal, no drainage    or sinus tenderness   Throat:   Lips, mucosa, and tongue normal; teeth and gums normal   Neck:   Supple, symmetrical, trachea midline, no adenopathy;     thyroid:  no enlargement/tenderness/nodules; no carotid    bruit or JVD   Back:     Symmetric, no curvature, ROM normal, no CVA tenderness   Lungs:     Clear to auscultation bilaterally, respirations unlabored   Chest Wall:    No tenderness or deformity, Tunneled port in place in the right " upper chest.      Heart:    Regular rate and rhythm, S1 and S2 normal, no murmur, rub   or gallop, normal carotid impulse bilaterally without bruit.   Abdomen:     Soft, non-tender, bowel sounds active all four quadrants,     no masses, no organomegaly   Extremities:   Extremities normal, atraumatic, no cyanosis or edema   Pulses:   2+ and symmetric all extremities   Skin:   Skin color, texture, turgor normal, no rashes or lesions   Lymph nodes:   Cervical, supraclavicular, and axillary nodes normal   Neurologic:   CNII-XII intact, normal strength, sensation and reflexes     throughout     PROBLEM LIST:  Active Problems:    * No active hospital problems. *      Past Medical History:   Diagnosis Date   • Abdominal pain    • Abdominal swelling    • Hoyos's disease    • Bipolar 1 disorder    • Brain tumor     R Frontal Lobe per pt   • Brain tumor 2014   • Brain tumor (benign)    • Constipation    • DDD (degenerative disc disease), cervical 05/29/2017   • DDD (degenerative disc disease), cervical    • Diarrhea    • Fibromyalgia    • IBS (irritable bowel syndrome)    • Migraine    • Nausea & vomiting    • PONV (postoperative nausea and vomiting)    • PTSD (post-traumatic stress disorder)    • Rectal bleeding      Past Surgical History:   Procedure Laterality Date   • ANAL SCOPE N/A 7/28/2016    Procedure: ANAL SCOPE;  Surgeon: Kael Lopez MD;  Location: [facility] OR;  Service:    • APPENDECTOMY     • COLONOSCOPY N/A 6/30/2016    Procedure: COLONOSCOPY  CPTCODE:91327;  Surgeon: Jose Antonio Belle III, MD;  Location: [facility] OR;  Service:    • COLONOSCOPY N/A 7/7/2016    Procedure: COLONOSCOPY (85114) CPT;  Surgeon: Jose Antonio Belle III, MD;  Location: [facility] OR;  Service:    • CYSTOSCOPY RETROGRADE PYELOGRAM Bilateral 4/28/2017    Procedure: CYSTOSCOPY RETROGRADE PYELOGRAM;  Surgeon: Mu Blunt MD;  Location: [facility] OR;  Service:    • ENDOSCOPY N/A 6/30/2016    Procedure: ESOPHAGOGASTRODUODENOSCOPY WITH  BIOPSY  CPTCODE:68559;  Surgeon: Jose Antonio Belle III, MD;  Location: [facility] OR;  Service:    • HEMORRHOIDECTOMY N/A 7/28/2016    Procedure: HEMORRHOID STAPLING;  Surgeon: Kael Lopez MD;  Location: [facility] OR;  Service:    • HYSTERECTOMY     • KNEE SURGERY     • LAPAROSCOPIC SALPINGOOPHERECTOMY     • PORTACATH PLACEMENT N/A 7/28/2017    Procedure: INSERTION OF PORTACATH;  Surgeon: Celso Arredondo MD;  Location: [facility] OR;  Service:    • SHOULDER SURGERY      3 times       Allergies  Allergies   Allergen Reactions   • Ativan [Lorazepam] Hallucinations     confusion   • Sulfa Antibiotics Shortness Of Breath and Swelling   • Compazine [Prochlorperazine Edisylate] Hives   • Demerol [Meperidine] Hives   • Droperidol Itching   • Metoclopramide Swelling   • Toradol [Ketorolac Tromethamine] Hives and Itching   • Zofran [Ondansetron Hcl] Rash       Current Medications    Current Facility-Administered Medications:   •  aspirin chewable tablet 324 mg, 324 mg, Oral, Once, Zachary Bran MD  •  dexamethasone (DECADRON) IVPB 10 mg, 10 mg, Intravenous, Once, Zachary Bran MD  •  diphenhydrAMINE (BENADRYL) injection 25 mg, 25 mg, Intravenous, Once, Zachary Bran MD  •  promethazine (PHENERGAN) injection 12.5 mg, 12.5 mg, Intravenous, Once, Zachary Bran MD  •  sodium chloride 0.9 % flush 10 mL, 10 mL, Intravenous, PRN, Zachary Bran MD  •  Testosterone Cypionate (DEPOTESTOTERONE CYPIONATE) injection 100 mg, 100 mg, Intramuscular, Once, Mu Blutn MD    Current Outpatient Prescriptions:   •  busPIRone (BUSPAR) 15 MG tablet, Take 15 mg by mouth 3 (three) times a day  , Disp: , Rfl:   •  butorphanol (STADOL) 10 MG/ML nasal spray, 1 spray into each nostril Daily., Disp: , Rfl:   •  cetirizine (zyrTEC) 10 MG tablet, Take 1 tablet by mouth Daily., Disp: 30 tablet, Rfl: 0  •  divalproex (DEPAKOTE) 500 MG DR tablet, Take 250 mg by mouth 3 (Three) Times a Day., Disp: , Rfl:   •  doxycycline (MONODOX) 100 MG  capsule, Take 1 capsule by mouth 2 (Two) Times a Day., Disp: 20 capsule, Rfl: 0  •  oxyCODONE-acetaminophen (PERCOCET)  MG per tablet, Take 1 tablet by mouth Every 6 (Six) Hours As Needed for Moderate Pain ., Disp: 12 tablet, Rfl: 0  •  pantoprazole (PROTONIX) 40 MG EC tablet, Take 40 mg by mouth Daily As Needed., Disp: , Rfl:   •  promethazine (PHENERGAN) 25 MG tablet, Take 1 tablet by mouth Every 6 (Six) Hours As Needed for Nausea or Vomiting., Disp: 30 tablet, Rfl: 0  •  sertraline (ZOLOFT) 100 MG tablet, Take 100 mg by mouth 2 (Two) Times a Day., Disp: , Rfl:   •  SUMAtriptan (IMITREX) 6 MG/0.5ML solution injection, Inject 6 mg under the skin 1 (One) Time., Disp: , Rfl:   •  topiramate (TOPAMAX) 25 MG capsule, Take 25 mg by mouth 3 (Three) Times a Day., Disp: , Rfl:            ROS  All systems were reviewed and negative except for:  Constitution:  positive for fatigue  Respiratory: positive for  cough, productive and shortness of air  Cardiovascular: positive for  chest pressure / pain, at rest and palpitations  Gastrointestinal: postitive for  bloating / distention  Musculoskeletal: positive for  joint pain  Behavioral/Psych: positive for  anxiety      SOCIAL HX  Social History     Social History   • Marital status:      Spouse name: N/A   • Number of children: N/A   • Years of education: N/A     Occupational History   • Not on file.     Social History Main Topics   • Smoking status: Former Smoker     Packs/day: 1.00     Years: 20.00     Quit date: 11/1/2015   • Smokeless tobacco: Never Used   • Alcohol use No   • Drug use: Yes     Types: Marijuana      Comment: for pain   • Sexual activity: Defer     Other Topics Concern   • Not on file     Social History Narrative   • No narrative on file       FAMILY HX  Family History   Problem Relation Age of Onset   • Crohn's disease Other    • Hypertension Other    • Diabetes Other    • Irritable bowel syndrome Other          Diagnostic Data:  Lab Results  (last 24 hours)     Procedure Component Value Units Date/Time    [place] Draw [[id number]] Collected:  06/05/18 1515    Specimen:  Blood Updated:  06/05/18 1637    Narrative:       The following orders were created for panel order [place] Draw.  Procedure                               Abnormality         Status                     ---------                               -----------         ------                     Light Blue Top[[id number]]                                   In process                 Green Top (Gel)[[id number]]                                  Final result               Lavender Top[[id number]]                                     In process                 Gold Top - SST[[id number]]                                   In process                 Green Top (No Gel)[[id number]]                                                            Please view results for these tests on the individual orders.    Light Blue Top [[id number]] Collected:  06/05/18 1627    Specimen:  Blood Updated:  06/05/18 1635    BNP [[id number]] Collected:  06/05/18 1627    Specimen:  Blood Updated:  06/05/18 1635    Gold Top - SST [[id number]] Collected:  06/05/18 1627    Specimen:  Blood Updated:  06/05/18 1635    CBC & Differential [[id number]] Collected:  06/05/18 1627    Specimen:  Blood Updated:  06/05/18 1635    Narrative:       The following orders were created for panel order CBC & Differential.  Procedure                               Abnormality         Status                     ---------                               -----------         ------                     CBC Auto Differential[[id number]]                            In process                   Please view results for these tests on the individual orders.    Lavender Top [[id number]] Collected:  06/05/18 1627    Specimen:  Blood Updated:  06/05/18 1635    CBC Auto Differential [[id number]] Collected:  06/05/18 1627    Specimen:  Blood Updated:  06/05/18 1635    Green Top  (Gel) [[id number]] Collected:  06/05/18 1515    Specimen:  Blood Updated:  06/05/18 1630     Extra Tube Hold for add-ons.     Comment: Auto resulted.       Magnesium [[id number]] Collected:  06/05/18 1515    Specimen:  Blood Updated:  06/05/18 1624    TSH [[id number]] Collected:  06/05/18 1515    Specimen:  Blood Updated:  06/05/18 1624    T4, Free [[id number]] Collected:  06/05/18 1515    Specimen:  Blood Updated:  06/05/18 1623    Comprehensive Metabolic Panel [[id number]]  (Abnormal) Collected:  06/05/18 1515    Specimen:  Blood Updated:  06/05/18 1602     Glucose 88 mg/dL      BUN 6 (L) mg/dL      Creatinine 0.66 mg/dL      Sodium 143 mmol/L      Potassium 3.6 mmol/L      Chloride 115 (H) mmol/L      CO2 23.0 mmol/L      Calcium 9.7 mg/dL      Total Protein 7.4 g/dL      Albumin 4.46 g/dL      ALT (SGPT) 73 (H) U/L      AST (SGOT) 42 (H) U/L      Alkaline Phosphatase 77 U/L      Total Bilirubin 0.5 mg/dL      eGFR Non African Amer 98 mL/min/1.73      Globulin 2.9 gm/dL      A/G Ratio 1.5 g/dL      BUN/Creatinine Ratio 9.1     Anion Gap 5.0 mmol/L     Narrative:       [employer] Guidelines    Stage     Description        GFR  1         Normal or High     90+  2         Mild decrease      60-89  3         Moderate decrease  30-59  4         Severe decrease    15-29  5         Kidney failure     <15    Troponin [[id number]]  (Normal) Collected:  06/05/18 1515    Specimen:  Blood Updated:  06/05/18 1602     Troponin I <0.006 ng/mL      Comment: Results may be falsely decreased if patient taking Biotin.       Lipase [[id number]] Collected:  06/05/18 1515    Specimen:  Blood Updated:  06/05/18 1518        ECG/EMG Results (last 24 hours)     Procedure Component Value Units Date/Time    ECG 12 Lead [[id number]] Collected:  06/05/18 1509     Updated:  06/05/18 1617    Narrative:       Test Reason : chest pain  Blood Pressure : **/** mmHG  Vent. Rate : 083 BPM     Atrial Rate : 083 BPM     P-R Int : 118 ms           QRS Dur : 076 ms      QT Int : 368 ms       P-R-T Axes : 047 033 063 degrees     QTc Int : 432 ms    Normal sinus rhythm  Normal ECG  When compared with ECG of 19-MAR-2018 10:17,  No significant change was found  Confirmed by SHOAIB WHALEN MD ([id number]) on 6/5/2018 4:17:03 PM    Referred By: MD ER           Confirmed By:SHOAIB WHALEN MD             Active Problems:    * No active hospital problems. *          Alvin Kee III, MD   4:38 PM 6/5/2018             Electronically signed by Alvin Kee III, MD at 6/5/2018  5:02 PM

## 2018-06-07 LAB — VALPROATE SERPL-MCNC: 93 MCG/ML (ref 50–150)

## 2018-06-07 PROCEDURE — 25010000002 PROMETHAZINE PER 50 MG: Performed by: PSYCHIATRY & NEUROLOGY

## 2018-06-07 PROCEDURE — 25010000002 ENOXAPARIN PER 10 MG: Performed by: HOSPITALIST

## 2018-06-07 PROCEDURE — 25010000002 DIHYDROERGOTAMINE MESYLATE PER 1 MG: Performed by: PSYCHIATRY & NEUROLOGY

## 2018-06-07 PROCEDURE — 25010000002 DEXAMETHASONE PER 1 MG: Performed by: PSYCHIATRY & NEUROLOGY

## 2018-06-07 PROCEDURE — 99232 SBSQ HOSP IP/OBS MODERATE 35: CPT | Performed by: HOSPITALIST

## 2018-06-07 PROCEDURE — 99231 SBSQ HOSP IP/OBS SF/LOW 25: CPT | Performed by: PSYCHIATRY & NEUROLOGY

## 2018-06-07 PROCEDURE — 25010000002 BUTORPHANOL PER 1 MG: Performed by: PSYCHIATRY & NEUROLOGY

## 2018-06-07 PROCEDURE — 80164 ASSAY DIPROPYLACETIC ACD TOT: CPT | Performed by: PSYCHIATRY & NEUROLOGY

## 2018-06-07 RX ORDER — DEXAMETHASONE SODIUM PHOSPHATE 4 MG/ML
10 INJECTION, SOLUTION INTRA-ARTICULAR; INTRALESIONAL; INTRAMUSCULAR; INTRAVENOUS; SOFT TISSUE EVERY 24 HOURS
Status: DISCONTINUED | OUTPATIENT
Start: 2018-06-07 | End: 2018-06-09 | Stop reason: HOSPADM

## 2018-06-07 RX ORDER — DIHYDROERGOTAMINE MESYLATE 1 MG/ML
1 INJECTION, SOLUTION INTRAMUSCULAR; INTRAVENOUS; SUBCUTANEOUS EVERY 8 HOURS
Status: COMPLETED | OUTPATIENT
Start: 2018-06-07 | End: 2018-06-09

## 2018-06-07 RX ADMIN — DIHYDROERGOTAMINE MESYLATE 1 MG: 1 INJECTION, SOLUTION INTRAMUSCULAR; INTRAVENOUS; SUBCUTANEOUS at 14:50

## 2018-06-07 RX ADMIN — ENOXAPARIN SODIUM 40 MG: 40 INJECTION SUBCUTANEOUS at 08:22

## 2018-06-07 RX ADMIN — MONTELUKAST SODIUM 10 MG: 10 TABLET, FILM COATED ORAL at 20:57

## 2018-06-07 RX ADMIN — DEXAMETHASONE SODIUM PHOSPHATE 10 MG: 4 INJECTION, SOLUTION INTRAMUSCULAR; INTRAVENOUS at 12:06

## 2018-06-07 RX ADMIN — DIHYDROERGOTAMINE MESYLATE 0.5 MG: 1 INJECTION, SOLUTION INTRAMUSCULAR; INTRAVENOUS; SUBCUTANEOUS at 06:21

## 2018-06-07 RX ADMIN — BUTORPHANOL TARTRATE 2 MG: 1 INJECTION, SOLUTION INTRAMUSCULAR; INTRAVENOUS at 14:57

## 2018-06-07 RX ADMIN — VALPROATE SODIUM 500 MG: 100 INJECTION, SOLUTION INTRAVENOUS at 14:49

## 2018-06-07 RX ADMIN — BUSPIRONE HYDROCHLORIDE 15 MG: 15 TABLET ORAL at 16:38

## 2018-06-07 RX ADMIN — VALPROATE SODIUM 500 MG: 100 INJECTION, SOLUTION INTRAVENOUS at 22:18

## 2018-06-07 RX ADMIN — BUTORPHANOL TARTRATE 2 MG: 1 INJECTION, SOLUTION INTRAMUSCULAR; INTRAVENOUS at 02:45

## 2018-06-07 RX ADMIN — SERTRALINE HYDROCHLORIDE 100 MG: 100 TABLET ORAL at 08:23

## 2018-06-07 RX ADMIN — BUTORPHANOL TARTRATE 2 MG: 1 INJECTION, SOLUTION INTRAMUSCULAR; INTRAVENOUS at 22:17

## 2018-06-07 RX ADMIN — PROMETHAZINE HYDROCHLORIDE 12.5 MG: 25 INJECTION INTRAMUSCULAR; INTRAVENOUS at 02:32

## 2018-06-07 RX ADMIN — TOPIRAMATE 50 MG: 25 CAPSULE, COATED PELLETS ORAL at 08:23

## 2018-06-07 RX ADMIN — BUTORPHANOL TARTRATE 2 MG: 1 INJECTION, SOLUTION INTRAMUSCULAR; INTRAVENOUS at 18:19

## 2018-06-07 RX ADMIN — PROMETHAZINE HYDROCHLORIDE 12.5 MG: 25 INJECTION INTRAMUSCULAR; INTRAVENOUS at 20:57

## 2018-06-07 RX ADMIN — VALPROATE SODIUM 500 MG: 100 INJECTION, SOLUTION INTRAVENOUS at 06:21

## 2018-06-07 RX ADMIN — BUTORPHANOL TARTRATE 2 MG: 1 INJECTION, SOLUTION INTRAMUSCULAR; INTRAVENOUS at 10:26

## 2018-06-07 RX ADMIN — PROMETHAZINE HYDROCHLORIDE 12.5 MG: 25 INJECTION INTRAMUSCULAR; INTRAVENOUS at 08:23

## 2018-06-07 RX ADMIN — LIDOCAINE 1 PATCH: 50 PATCH CUTANEOUS at 18:19

## 2018-06-07 RX ADMIN — BUSPIRONE HYDROCHLORIDE 15 MG: 15 TABLET ORAL at 08:23

## 2018-06-07 RX ADMIN — DIHYDROERGOTAMINE MESYLATE 1 MG: 1 INJECTION, SOLUTION INTRAMUSCULAR; INTRAVENOUS; SUBCUTANEOUS at 22:18

## 2018-06-07 RX ADMIN — PROMETHAZINE HYDROCHLORIDE 12.5 MG: 25 INJECTION INTRAMUSCULAR; INTRAVENOUS at 14:49

## 2018-06-07 RX ADMIN — PANTOPRAZOLE SODIUM 40 MG: 40 TABLET, DELAYED RELEASE ORAL at 06:20

## 2018-06-07 RX ADMIN — BUSPIRONE HYDROCHLORIDE 15 MG: 15 TABLET ORAL at 20:57

## 2018-06-07 RX ADMIN — SERTRALINE HYDROCHLORIDE 100 MG: 100 TABLET ORAL at 20:57

## 2018-06-07 RX ADMIN — BUTORPHANOL TARTRATE 2 MG: 1 INJECTION, SOLUTION INTRAMUSCULAR; INTRAVENOUS at 06:20

## 2018-06-07 NOTE — PROGRESS NOTES
"Neurology       Patient Care Team:  MIGDALIA Gooden as PCP - General (Family Medicine)    Chief complaint: Migraine    History:  Case reviewed with Dr. Ybarra.    Briefly she is had migraine flare secondary to nitroglycerin.    She is responding to the DHE but not as fast as previously.    Her previous admissions she responded nicely to 0.5 mg of DHE.    She had noticedno  problems with side effects at that point.    His chronic ongoing headache disease and is taking Topamax and Depakote for that.      Past Medical History:   Diagnosis Date   • Abdominal pain    • Abdominal swelling    • Hoyos's disease    • Bipolar 1 disorder    • Brain tumor     R Frontal Lobe per pt   • Brain tumor 2014   • Brain tumor (benign)    • Constipation    • DDD (degenerative disc disease), cervical 05/29/2017   • DDD (degenerative disc disease), cervical    • Diarrhea    • Fibromyalgia    • IBS (irritable bowel syndrome)    • Migraine    • Nausea & vomiting    • PONV (postoperative nausea and vomiting)    • PTSD (post-traumatic stress disorder)    • Rectal bleeding        Vital Signs   Vitals:    06/06/18 0953 06/06/18 1530 06/06/18 1926 06/07/18 0451   BP: 110/88 116/69 126/67 123/92   BP Location:   Left arm Left arm   Patient Position:   Lying Lying   Pulse: 63   67   Resp:   18 18   Temp:  97.6 °F (36.4 °C) 97.5 °F (36.4 °C) 97.7 °F (36.5 °C)   TempSrc:   Oral Oral   SpO2:    97%   Weight: 64 kg (141 lb 1.5 oz)   63 kg (138 lb 12.8 oz)   Height: 175.3 cm (69.02\")          Physical Exam:   General: Awake and alert              Neuro: Oriented to person place and time.    Speech is normal.    Face is symmetrical.    Extremities move well.        Results Review:  Reviewed    Results from last 7 days  Lab Units 06/06/18  0636   WBC 10*3/mm3 8.61   HEMOGLOBIN g/dL 11.3*   HEMATOCRIT % 35.4   PLATELETS 10*3/mm3 139*       Results from last 7 days  Lab Units 06/06/18  0636 06/05/18  1515 06/04/18  1238   SODIUM mmol/L 142 143 " 140   POTASSIUM mmol/L 4.1 3.6 3.9   CHLORIDE mmol/L 112* 115* 110   CO2 mmol/L 26.0 23.0 24.7   BUN mg/dL 9 6* 7   CREATININE mg/dL 0.63 0.66 0.66   CALCIUM mg/dL 8.8 9.7 9.4   BILIRUBIN mg/dL 0.5 0.5 1.1   ALK PHOS U/L 67 77 77   ALT (SGPT) U/L 61* 73* 81*   AST (SGOT) U/L 33 42* 64*   GLUCOSE mg/dL 110* 88 102       Imaging Results (last 24 hours)     Procedure Component Value Units Date/Time    XR Chest 1 View [[id number]] Collected:  06/06/18 0846     Updated:  06/06/18 1336    Narrative:       EXAMINATION:  XR CHEST ONE VIEW - 06/05/2018     INDICATION:  Chest pain     TECHNIQUE:  Single view frontal chest.     COMPARISONS: None.     FINDINGS:  Lungs are without focal abnormality. No pleural effusion or  pneumothorax. Cardiomediastinal silhouette is within normal limits.  Right chest Mediport in place.       Impression:       No acute cardiopulmonary abnormality.     D:  06/05/2018  E:  06/06/2018     This report was finalized on 6/6/2018 1:34 PM by Evan Fraser.             Assessment:  Status migraine    Plan:  Increase DHE to 1 mg every 8 hours    Continue Decadron 10 mg daily    Comment:  Probable discharge tomorrow         I discussed the patients findings and my recommendations with patient and primary care team    Sathish Key MD  06/07/18  10:42 AM

## 2018-06-07 NOTE — PROGRESS NOTES
Continued Stay Note  [facility]     Patient Name: Susanna Camilo  MRN: [id number]  Today's Date: 6/7/2018    Admit Date: 6/5/2018          Discharge Plan     Row Name 06/07/18 1018       Plan    Plan Home    Patient/Family in Agreement with Plan yes    Plan Comments Spoke with patient and  at bedside. Denies any DME/HH needs at this time. Plan remains to return home w/ assist from . CM following.     Final Discharge Disposition Code 01 - home or self-care              Discharge Codes    No documentation.           Fatimah Marmolejo RN

## 2018-06-07 NOTE — PLAN OF CARE
Problem: Patient Care Overview  Goal: Plan of Care Review  Outcome: Ongoing (interventions implemented as appropriate)   06/07/18 9278   Coping/Psychosocial   Plan of Care Reviewed With patient;spouse   Plan of Care Review   Progress improving   OTHER   Outcome Summary Tele D/C'd yesterday. C/O headache on & off. D/C home tomorrow

## 2018-06-08 PROCEDURE — 99233 SBSQ HOSP IP/OBS HIGH 50: CPT | Performed by: HOSPITALIST

## 2018-06-08 PROCEDURE — 25010000002 ENOXAPARIN PER 10 MG: Performed by: HOSPITALIST

## 2018-06-08 PROCEDURE — 25010000002 BUTORPHANOL PER 1 MG: Performed by: PSYCHIATRY & NEUROLOGY

## 2018-06-08 PROCEDURE — 99231 SBSQ HOSP IP/OBS SF/LOW 25: CPT | Performed by: PSYCHIATRY & NEUROLOGY

## 2018-06-08 PROCEDURE — 25010000002 DEXAMETHASONE PER 1 MG: Performed by: PSYCHIATRY & NEUROLOGY

## 2018-06-08 PROCEDURE — 25010000002 PROMETHAZINE PER 50 MG: Performed by: PSYCHIATRY & NEUROLOGY

## 2018-06-08 PROCEDURE — 25010000002 DIHYDROERGOTAMINE MESYLATE PER 1 MG: Performed by: PSYCHIATRY & NEUROLOGY

## 2018-06-08 RX ORDER — DIVALPROEX SODIUM 500 MG/1
500 TABLET, DELAYED RELEASE ORAL EVERY 8 HOURS SCHEDULED
Status: DISCONTINUED | OUTPATIENT
Start: 2018-06-08 | End: 2018-06-09 | Stop reason: HOSPADM

## 2018-06-08 RX ADMIN — BUSPIRONE HYDROCHLORIDE 15 MG: 15 TABLET ORAL at 08:23

## 2018-06-08 RX ADMIN — BUTORPHANOL TARTRATE 2 MG: 1 INJECTION, SOLUTION INTRAMUSCULAR; INTRAVENOUS at 12:13

## 2018-06-08 RX ADMIN — DIHYDROERGOTAMINE MESYLATE 1 MG: 1 INJECTION, SOLUTION INTRAMUSCULAR; INTRAVENOUS; SUBCUTANEOUS at 21:48

## 2018-06-08 RX ADMIN — BUTORPHANOL TARTRATE 2 MG: 1 INJECTION, SOLUTION INTRAMUSCULAR; INTRAVENOUS at 15:56

## 2018-06-08 RX ADMIN — BUTORPHANOL TARTRATE 2 MG: 1 INJECTION, SOLUTION INTRAMUSCULAR; INTRAVENOUS at 02:21

## 2018-06-08 RX ADMIN — BUTORPHANOL TARTRATE 2 MG: 1 INJECTION, SOLUTION INTRAMUSCULAR; INTRAVENOUS at 20:13

## 2018-06-08 RX ADMIN — MONTELUKAST SODIUM 10 MG: 10 TABLET, FILM COATED ORAL at 20:09

## 2018-06-08 RX ADMIN — BUTORPHANOL TARTRATE 2 MG: 1 INJECTION, SOLUTION INTRAMUSCULAR; INTRAVENOUS at 08:22

## 2018-06-08 RX ADMIN — DIVALPROEX SODIUM 500 MG: 250 TABLET, DELAYED RELEASE ORAL at 21:48

## 2018-06-08 RX ADMIN — BUSPIRONE HYDROCHLORIDE 15 MG: 15 TABLET ORAL at 15:55

## 2018-06-08 RX ADMIN — PROMETHAZINE HYDROCHLORIDE 12.5 MG: 25 INJECTION INTRAMUSCULAR; INTRAVENOUS at 14:22

## 2018-06-08 RX ADMIN — DIHYDROERGOTAMINE MESYLATE 1 MG: 1 INJECTION, SOLUTION INTRAMUSCULAR; INTRAVENOUS; SUBCUTANEOUS at 06:05

## 2018-06-08 RX ADMIN — FLUTICASONE PROPIONATE 2 SPRAY: 50 SPRAY, METERED NASAL at 08:30

## 2018-06-08 RX ADMIN — DIVALPROEX SODIUM 500 MG: 250 TABLET, DELAYED RELEASE ORAL at 14:22

## 2018-06-08 RX ADMIN — DIHYDROERGOTAMINE MESYLATE 1 MG: 1 INJECTION, SOLUTION INTRAMUSCULAR; INTRAVENOUS; SUBCUTANEOUS at 14:22

## 2018-06-08 RX ADMIN — LIDOCAINE 1 PATCH: 50 PATCH CUTANEOUS at 08:23

## 2018-06-08 RX ADMIN — BUSPIRONE HYDROCHLORIDE 15 MG: 15 TABLET ORAL at 20:10

## 2018-06-08 RX ADMIN — VALPROATE SODIUM 500 MG: 100 INJECTION, SOLUTION INTRAVENOUS at 06:05

## 2018-06-08 RX ADMIN — ENOXAPARIN SODIUM 40 MG: 40 INJECTION SUBCUTANEOUS at 08:23

## 2018-06-08 RX ADMIN — SERTRALINE HYDROCHLORIDE 100 MG: 100 TABLET ORAL at 08:23

## 2018-06-08 RX ADMIN — BUTORPHANOL TARTRATE 2 MG: 1 INJECTION, SOLUTION INTRAMUSCULAR; INTRAVENOUS at 06:05

## 2018-06-08 RX ADMIN — SERTRALINE HYDROCHLORIDE 100 MG: 100 TABLET ORAL at 20:10

## 2018-06-08 RX ADMIN — DEXAMETHASONE SODIUM PHOSPHATE 10 MG: 4 INJECTION, SOLUTION INTRAMUSCULAR; INTRAVENOUS at 12:13

## 2018-06-08 RX ADMIN — PROMETHAZINE HYDROCHLORIDE 12.5 MG: 25 INJECTION INTRAMUSCULAR; INTRAVENOUS at 20:09

## 2018-06-08 RX ADMIN — PROMETHAZINE HYDROCHLORIDE 12.5 MG: 25 INJECTION INTRAMUSCULAR; INTRAVENOUS at 02:21

## 2018-06-08 RX ADMIN — PANTOPRAZOLE SODIUM 40 MG: 40 TABLET, DELAYED RELEASE ORAL at 06:05

## 2018-06-08 RX ADMIN — PROMETHAZINE HYDROCHLORIDE 12.5 MG: 25 INJECTION INTRAMUSCULAR; INTRAVENOUS at 08:23

## 2018-06-08 NOTE — PROGRESS NOTES
[facility] Medicine Services  PROGRESS NOTE    Patient Name: Susanna Camilo  : 1975  MRN: [id number]    Date of Admission: 2018  Length of Stay: 3  Primary Care Physician: MIGDALIA Gooden    Subjective   Subjective     CC:  FU CP    HPI:  Migraines better.  at bedside. Twinges of chest discomfort but much better.    Review of Systems  Gen- No fevers, chills  CV- No chest pain, palpitations  Resp- No cough, dyspnea  GI- No N/V/D, abd pain    Otherwise ROS is negative except as mentioned in the HPI.    Objective   Objective     Vital Signs:   Temp:  [97.4 °F (36.3 °C)-97.9 °F (36.6 °C)] 97.9 °F (36.6 °C)  Heart Rate:  [78-80] 80  Resp:  [16] 16  BP: ()/(61-89) 107/89        Physical Exam:  Constitutional: No acute distress, awake, alert on RA  Eyes: PERRLA, sclerae anicteric, no conjunctival injection  HENT: NCAT, mucous membranes moist  Neck: Supple, no thyromegaly, no lymphadenopathy, trachea midline  Respiratory: Clear to auscultation bilaterally, nonlabored respirations   Cardiovascular: RRR, no murmurs, rubs, or gallops, palpable pedal pulses bilaterally  Gastrointestinal: Positive bowel sounds, soft, nontender, nondistended  Musculoskeletal: No bilateral ankle edema, no clubbing or cyanosis to extremities  Psychiatric: Appropriate affect, cooperative  Neurologic: Oriented x 3, strength symmetric in all extremities, Cranial Nerves grossly intact to confrontation, speech clear  Skin: No rashes    Results Reviewed:  I have personally reviewed current lab, radiology, and data and agree.      Results from last 7 days  Lab Units 18  0636 18  1627 18  1238   WBC 10*3/mm3 8.61 10.67 4.45*   HEMOGLOBIN g/dL 11.3* 11.9 13.3   HEMATOCRIT % 35.4 36.9 39.7   PLATELETS 10*3/mm3 139* 150 148   INR   --   --  1.05       Results from last 7 days  Lab Units 18  0636 18  1515 18  1238   SODIUM mmol/L 142 143 140   POTASSIUM mmol/L 4.1  3.6 3.9   CHLORIDE mmol/L 112* 115* 110   CO2 mmol/L 26.0 23.0 24.7   BUN mg/dL 9 6* 7   CREATININE mg/dL 0.63 0.66 0.66   GLUCOSE mg/dL 110* 88 102   CALCIUM mg/dL 8.8 9.7 9.4   ALT (SGPT) U/L 61* 73* 81*   AST (SGOT) U/L 33 42* 64*   TROPONIN I ng/mL <0.006 <0.006 <0.006     BNP   Date Value Ref Range Status   06/05/2018 27.0 0.0 - 100.0 pg/mL Final     Comment:     Results may be falsely decreased if patient taking Biotin.     No results found for: PHART    Microbiology Results Abnormal     None          Imaging Results (last 24 hours)     ** No results found for the last 24 hours. **        Results for orders placed during the hospital encounter of 06/05/18   Adult Stress Echo W/ Cont or Stress Agent if Necessary Per Protocol    Narrative · Pt. c/ chest heaviness and tightness with exercise, decreased post   exercise  · Expected exercise duration = 8:45 Actual = 6:42  · Normal ECG stress ECG interpretation.  · Left ventricular systolic function is normal.  · Normal stress echo with no significant echocardiographic evidence for   myocardial ischemia.          I have reviewed the medications.    Assessment/Plan   Assessment / Plan     Hospital Problem List     * (Principal)Precordial pain    Intractable headache    Anxiety    Benign brain tumor    Fibromyalgia    Bipolar 1 disorder    Crohn disease    Migraine           Brief Hospital Course to date:  Susanna Camilo is a 43 y.o. female with history of bipolar disorder, anxiety disorder, depressive disorder, migraine disorder, fibromyalgia, Crohn's, IgA nephropathy, hepatitis C, severe urethral stenosis who presents to emergency department with complaints of chest pain.     Assessment & Plan:    - Migraine: Better but not resolved. Depakote dose increased this admission, Stadol q4hrs PRN. Continue DHE protocol at increased dose. Appreciate Neuro assistance. Topamax DCed given nephrolithiasis  - Non-cardiac CP: Stress ECHO normal 6/6  - L sided rib pain: Reproducible  and pleuritic. Lidocaine patch  - Anxiety: Continue Buspirone TID    DVT Prophylaxis:  pLOV    CODE STATUS: Full Code    Disposition: I expect the patient to be discharged when migraine is better controlled- likely tomorrow    Sangeeta Virgen MD  06/08/18  11:40 AM

## 2018-06-08 NOTE — PROGRESS NOTES
Continued Stay Note  [facility]     Patient Name: Susanna Camilo  MRN: [id number]  Today's Date: 6/8/2018    Admit Date: 6/5/2018          Discharge Plan     Row Name 06/08/18 1110       Plan    Plan Home    Patient/Family in Agreement with Plan yes    Plan Comments Spoke with patient at bedside. No needs identified at this time. Plan remains to return home w/ the assist of her . Per MD anticipate dc tomorrow. CM following.     Final Discharge Disposition Code 01 - home or self-care              Discharge Codes    No documentation.           Fatimah Marmolejo RN

## 2018-06-08 NOTE — PROGRESS NOTES
Neurology       Patient Care Team:  MIGDALIA Gooden as PCP - General (Family Medicine)    Chief complaint: Status migraine    History:  Headache is the better today with an NIH of 4.  The nausea still was somewhat of an issue and she still needing Phenergan.      Past Medical History:   Diagnosis Date   • Abdominal pain    • Abdominal swelling    • Hoyos's disease    • Bipolar 1 disorder    • Brain tumor     R Frontal Lobe per pt   • Brain tumor 2014   • Brain tumor (benign)    • Constipation    • DDD (degenerative disc disease), cervical 05/29/2017   • DDD (degenerative disc disease), cervical    • Diarrhea    • Fibromyalgia    • IBS (irritable bowel syndrome)    • Migraine    • Nausea & vomiting    • PONV (postoperative nausea and vomiting)    • PTSD (post-traumatic stress disorder)    • Rectal bleeding        Vital Signs   Vitals:    06/07/18 1635 06/07/18 1855 06/08/18 0457 06/08/18 0834   BP: 111/65 104/86 94/61 107/89   BP Location:  Left arm Left arm    Patient Position:  Sitting Lying    Pulse:  78 80 80   Resp:  16 16 16   Temp: 97.6 °F (36.4 °C) 97.4 °F (36.3 °C) 97.6 °F (36.4 °C) 97.9 °F (36.6 °C)   TempSrc: Oral Oral Oral    SpO2:  97% 96%    Weight:       Height:           Physical Exam:   General: Pleasant and alert              Neuro: Oriented ×3.    Speech is normal.    She moves all extremities well.        Results Review:  Reviewed    Results from last 7 days  Lab Units 06/06/18  0636   WBC 10*3/mm3 8.61   HEMOGLOBIN g/dL 11.3*   HEMATOCRIT % 35.4   PLATELETS 10*3/mm3 139*       Results from last 7 days  Lab Units 06/06/18  0636 06/05/18  1515 06/04/18  1238   SODIUM mmol/L 142 143 140   POTASSIUM mmol/L 4.1 3.6 3.9   CHLORIDE mmol/L 112* 115* 110   CO2 mmol/L 26.0 23.0 24.7   BUN mg/dL 9 6* 7   CREATININE mg/dL 0.63 0.66 0.66   CALCIUM mg/dL 8.8 9.7 9.4   BILIRUBIN mg/dL 0.5 0.5 1.1   ALK PHOS U/L 67 77 77   ALT (SGPT) U/L 61* 73* 81*   AST (SGOT) U/L 33 42* 64*   GLUCOSE mg/dL 110* 88  102       Imaging Results (last 24 hours)     ** No results found for the last 24 hours. **          Assessment:  Status migraine    Plan:  Continue 9 doses of DHE with the Phenergan as needed.    Likely home tomorrow.    Topamax is discontinued due to the high risk of kidney stones and the previous history of the same    Comment:  Progressing nicely         I discussed the patients findings and my recommendations with patient and family    Sathish Key MD  06/08/18  10:23 AM

## 2018-06-08 NOTE — PLAN OF CARE
Problem: Patient Care Overview  Goal: Plan of Care Review   06/08/18 1500   Coping/Psychosocial   Plan of Care Reviewed With patient   Plan of Care Review   Progress no change

## 2018-06-09 VITALS
HEART RATE: 76 BPM | HEIGHT: 69 IN | RESPIRATION RATE: 16 BRPM | BODY MASS INDEX: 20.47 KG/M2 | OXYGEN SATURATION: 96 % | TEMPERATURE: 97.4 F | SYSTOLIC BLOOD PRESSURE: 117 MMHG | WEIGHT: 138.2 LBS | DIASTOLIC BLOOD PRESSURE: 57 MMHG

## 2018-06-09 PROCEDURE — 25010000002 ENOXAPARIN PER 10 MG: Performed by: HOSPITALIST

## 2018-06-09 PROCEDURE — 25010000002 HEPARIN FLUSH (PORCINE) 100 UNIT/ML SOLUTION

## 2018-06-09 PROCEDURE — 25010000002 DEXAMETHASONE PER 1 MG: Performed by: PSYCHIATRY & NEUROLOGY

## 2018-06-09 PROCEDURE — 25010000002 PROMETHAZINE PER 50 MG: Performed by: PSYCHIATRY & NEUROLOGY

## 2018-06-09 PROCEDURE — 25010000002 DIHYDROERGOTAMINE MESYLATE PER 1 MG: Performed by: PSYCHIATRY & NEUROLOGY

## 2018-06-09 PROCEDURE — 25010000002 BUTORPHANOL PER 1 MG: Performed by: PSYCHIATRY & NEUROLOGY

## 2018-06-09 PROCEDURE — 99239 HOSP IP/OBS DSCHRG MGMT >30: CPT | Performed by: HOSPITALIST

## 2018-06-09 RX ORDER — DIVALPROEX SODIUM 500 MG/1
500 TABLET, DELAYED RELEASE ORAL 3 TIMES DAILY
Qty: 90 TABLET | Refills: 2 | Status: SHIPPED | OUTPATIENT
Start: 2018-06-09

## 2018-06-09 RX ADMIN — BUTORPHANOL TARTRATE 2 MG: 1 INJECTION, SOLUTION INTRAMUSCULAR; INTRAVENOUS at 01:51

## 2018-06-09 RX ADMIN — SERTRALINE HYDROCHLORIDE 100 MG: 100 TABLET ORAL at 08:44

## 2018-06-09 RX ADMIN — DEXAMETHASONE SODIUM PHOSPHATE 10 MG: 4 INJECTION, SOLUTION INTRAMUSCULAR; INTRAVENOUS at 12:08

## 2018-06-09 RX ADMIN — DIHYDROERGOTAMINE MESYLATE 1 MG: 1 INJECTION, SOLUTION INTRAMUSCULAR; INTRAVENOUS; SUBCUTANEOUS at 06:14

## 2018-06-09 RX ADMIN — PROMETHAZINE HYDROCHLORIDE 12.5 MG: 25 INJECTION INTRAMUSCULAR; INTRAVENOUS at 08:45

## 2018-06-09 RX ADMIN — PANTOPRAZOLE SODIUM 40 MG: 40 TABLET, DELAYED RELEASE ORAL at 06:13

## 2018-06-09 RX ADMIN — BUTORPHANOL TARTRATE 2 MG: 1 INJECTION, SOLUTION INTRAMUSCULAR; INTRAVENOUS at 17:36

## 2018-06-09 RX ADMIN — BUSPIRONE HYDROCHLORIDE 15 MG: 15 TABLET ORAL at 08:44

## 2018-06-09 RX ADMIN — BUTORPHANOL TARTRATE 2 MG: 1 INJECTION, SOLUTION INTRAMUSCULAR; INTRAVENOUS at 14:08

## 2018-06-09 RX ADMIN — FLUTICASONE PROPIONATE 2 SPRAY: 50 SPRAY, METERED NASAL at 08:46

## 2018-06-09 RX ADMIN — ENOXAPARIN SODIUM 40 MG: 40 INJECTION SUBCUTANEOUS at 08:43

## 2018-06-09 RX ADMIN — PROMETHAZINE HYDROCHLORIDE 12.5 MG: 25 INJECTION INTRAMUSCULAR; INTRAVENOUS at 02:22

## 2018-06-09 RX ADMIN — BUTORPHANOL TARTRATE 2 MG: 1 INJECTION, SOLUTION INTRAMUSCULAR; INTRAVENOUS at 06:07

## 2018-06-09 RX ADMIN — PROMETHAZINE HYDROCHLORIDE 12.5 MG: 25 INJECTION INTRAMUSCULAR; INTRAVENOUS at 15:20

## 2018-06-09 RX ADMIN — BUTORPHANOL TARTRATE 2 MG: 1 INJECTION, SOLUTION INTRAMUSCULAR; INTRAVENOUS at 10:03

## 2018-06-09 RX ADMIN — DIVALPROEX SODIUM 500 MG: 250 TABLET, DELAYED RELEASE ORAL at 14:08

## 2018-06-09 RX ADMIN — DIVALPROEX SODIUM 500 MG: 250 TABLET, DELAYED RELEASE ORAL at 06:13

## 2018-06-09 RX ADMIN — HEPARIN 500 UNITS: 100 SYRINGE at 17:50

## 2018-06-09 NOTE — DISCHARGE SUMMARY
[facility] Medicine Services  DISCHARGE SUMMARY    Patient Name: Susanna Camilo  : 1975  MRN: [id number]    Date of Admission: 2018  Date of Discharge:  2018  Length of Stay: 4  Primary Care Physician: MIGDALIA Gooden    Consults     Date and Time Order Name Status Description    2018 1408 Inpatient Neurology Consult      2018 1334 Inpatient Neurology Consult Completed     2018 1805 Inpatient Cardiology Consult Completed         Hospital Course     Presenting Problem:   Precordial pain [R07.2]  Migraine [G43.909]    Active Hospital Problems (** Indicates Principal Problem)    Diagnosis Date Noted   • **Precordial pain [R07.2] 2018   • Migraine [G43.909] 2018   • Bipolar 1 disorder [F31.9] 2018   • Crohn disease [K50.90] 2018   • Benign brain tumor [D33.2] 03/15/2018   • Fibromyalgia [M79.7] 03/15/2018   • Anxiety [F41.9] 2016   • Intractable headache [R51] 2016      Resolved Hospital Problems    Diagnosis Date Noted Date Resolved   No resolved problems to display.          Hospital Course:  Susanna Camilo is a 43 y.o. female with a hx of migraines, IBS, fibromyalgia, anxiety presented to ED with CP. She was ruled out for ACS with negative troponins and negative stress test. CP likely from anxiety. Nitroglycerin for CP triggered her migraine that prolonged her stay. Underwent DHE protocol with PRN stadol, along with steroids and phenergan with improvement. Her Depakote dose increased and Topamax DCed for hx of nephrolithiasis.      Day of Discharge     HPI:   Still having migraines but better controlled. Wants to go home.    Review of Systems  Gen- No fevers or chills  CV- No chest pain or palpitations  Resp- No cough or dyspnea  GI- No N/V/D or abd pain    Otherwise ROS is negative except as mentioned in the HPI.    Vital Signs:   Temp:  [97.4 °F (36.3 °C)-97.6 °F (36.4 °C)] 97.4 °F (36.3 °C)  Heart Rate:  [76-87]  76  Resp:  [16] 16  BP: (108-117)/(57-60) 117/57     Physical Exam:  Constitutional: No acute distress, awake, alert on RA  Eyes: PERRLA, sclerae anicteric, no conjunctival injection  HENT: NCAT, mucous membranes moist  Neck: Supple, no thyromegaly, no lymphadenopathy, trachea midline  Respiratory: Clear to auscultation bilaterally, nonlabored respirations   Cardiovascular: RRR, no murmurs, rubs, or gallops, palpable pedal pulses bilaterally  Gastrointestinal: Positive bowel sounds, soft, nontender, nondistended  Musculoskeletal: No bilateral ankle edema, no clubbing or cyanosis to extremities, R sided port  Psychiatric: Appropriate affect, cooperative  Neurologic: Oriented x 3, strength symmetric in all extremities, Cranial Nerves grossly intact to confrontation, speech clear  Skin: No rashes    Pertinent  and/or Most Recent Results         Results from last 7 days  Lab Units 06/06/18 0636 06/05/18  1627 06/05/18  1515 06/04/18  1238   WBC 10*3/mm3 8.61 10.67  --  4.45*   HEMOGLOBIN g/dL 11.3* 11.9  --  13.3   HEMATOCRIT % 35.4 36.9  --  39.7   PLATELETS 10*3/mm3 139* 150  --  148   SODIUM mmol/L 142  --  143 140   POTASSIUM mmol/L 4.1  --  3.6 3.9   CHLORIDE mmol/L 112*  --  115* 110   CO2 mmol/L 26.0  --  23.0 24.7   BUN mg/dL 9  --  6* 7   CREATININE mg/dL 0.63  --  0.66 0.66   GLUCOSE mg/dL 110*  --  88 102   CALCIUM mg/dL 8.8  --  9.7 9.4       Results from last 7 days  Lab Units 06/06/18 0636 06/05/18  1515 06/04/18  1238   BILIRUBIN mg/dL 0.5 0.5 1.1   ALK PHOS U/L 67 77 77   ALT (SGPT) U/L 61* 73* 81*   AST (SGOT) U/L 33 42* 64*   PROTIME Seconds  --   --  13.9   INR   --   --  1.05           Invalid input(s): TG, LDLCALC, LDLREALC    Results from last 7 days  Lab Units 06/06/18 0636 06/05/18  1627 06/05/18  1515 06/04/18  1238   TSH mIU/mL  --   --  0.353  --    BNP pg/mL  --  27.0  --   --    TROPONIN I ng/mL <0.006  --  <0.006 <0.006     Brief Urine Lab Results  (Last result in the past 365 days)       Color   Clarity   Blood   Leuk Est   Nitrite   Protein   CREAT   Urine HCG        06/05/18 1736 Yellow Clear Negative Negative Negative Negative               Microbiology Results Abnormal     None          Imaging Results (all)     Procedure Component Value Units Date/Time    XR Chest 1 View [[id number]] Collected:  06/06/18 0846     Updated:  06/06/18 1336    Narrative:       EXAMINATION:  XR CHEST ONE VIEW - 06/05/2018     INDICATION:  Chest pain     TECHNIQUE:  Single view frontal chest.     COMPARISONS: None.     FINDINGS:  Lungs are without focal abnormality. No pleural effusion or  pneumothorax. Cardiomediastinal silhouette is within normal limits.  Right chest Mediport in place.       Impression:       No acute cardiopulmonary abnormality.     D:  06/05/2018  E:  06/06/2018     This report was finalized on 6/6/2018 1:34 PM by Evan Fraser.             Results for orders placed during the hospital encounter of 06/05/18   Adult Stress Echo W/ Cont or Stress Agent if Necessary Per Protocol    Narrative · Pt. c/ chest heaviness and tightness with exercise, decreased post   exercise  · Expected exercise duration = 8:45 Actual = 6:42  · Normal ECG stress ECG interpretation.  · Left ventricular systolic function is normal.  · Normal stress echo with no significant echocardiographic evidence for   myocardial ischemia.            Discharge Details      Susanna Camilo   Home Medication Instructions VINCENT:[id number]    Printed on:06/09/18 1128   Medication Information                      albuterol (PROVENTIL HFA;VENTOLIN HFA) 108 (90 Base) MCG/ACT inhaler  Inhale 2 puffs 2 (Two) Times a Day.             Azelastine HCl 137 MCG/SPRAY solution  1 spray into each nostril As Needed (Allergies).             busPIRone (BUSPAR) 15 MG tablet  Take 15 mg by mouth 3 (three) times a day                butorphanol (STADOL) 10 MG/ML nasal spray  1 spray into each nostril Daily.             cetirizine (zyrTEC) 10 MG tablet  Take 1  tablet by mouth Daily.             dicyclomine (BENTYL) 10 MG capsule  Take 10 mg by mouth 3 (Three) Times a Day.             divalproex (DEPAKOTE) 500 MG DR tablet  Take 1 tablet by mouth 3 (Three) Times a Day.             fluticasone (VERAMYST) 27.5 MCG/SPRAY nasal spray  2 sprays into each nostril Daily.             HYDROcodone-acetaminophen (NORCO) 7.5-325 MG per tablet  Take 1 tablet by mouth 2 (Two) Times a Day As Needed for Moderate Pain .             montelukast (SINGULAIR) 10 MG tablet  Take 10 mg by mouth Every Night.             pantoprazole (PROTONIX) 40 MG EC tablet  Take 40 mg by mouth 2 (Two) Times a Day As Needed.             phenazopyridine (PYRIDIUM) 100 MG tablet  Take 100 mg by mouth 3 (Three) Times a Day As Needed for bladder spasms.             sertraline (ZOLOFT) 100 MG tablet  Take 100 mg by mouth 2 (Two) Times a Day.             SUMAtriptan (IMITREX) 6 MG/0.5ML solution injection  Inject 6 mg under the skin 1 (One) Time.                   Discharge Disposition:  Home or Self Care    Discharge Diet:  Regular    Discharge Activity:  As tolerated  Future Appointments  Date Time Provider Department Center   6/26/2018 1:20 PM Mu Blunt MD [facility] None   6/27/2018 12:20 PM MKareem Hastings MD [facility] None       Additional Instructions for the Follow-ups that You Need to Schedule     Discharge Follow-up with PCP    As directed      Follow Up Details:  1-2 weeks         Discharge Follow-up with Specified Provider: Dr. Key; 1 Month    As directed      To:  Dr. Key    Follow Up:  1 Month               Time Spent on Discharge:  >35min    Sangeeta Virgen MD  06/09/18  11:28 AM

## 2018-06-14 ENCOUNTER — APPOINTMENT (OUTPATIENT)
Dept: CT IMAGING | Facility: HOSPITAL | Age: 43
End: 2018-06-14

## 2018-06-14 ENCOUNTER — HOSPITAL ENCOUNTER (EMERGENCY)
Facility: HOSPITAL | Age: 43
Discharge: HOME | End: 2018-06-14
Attending: EMERGENCY MEDICINE | Admitting: EMERGENCY MEDICINE

## 2018-06-14 VITALS
DIASTOLIC BLOOD PRESSURE: 72 MMHG | HEIGHT: 69 IN | WEIGHT: 168 LBS | TEMPERATURE: 97.5 F | SYSTOLIC BLOOD PRESSURE: 111 MMHG | HEART RATE: 72 BPM | OXYGEN SATURATION: 99 % | BODY MASS INDEX: 24.88 KG/M2 | RESPIRATION RATE: 16 BRPM

## 2018-06-14 DIAGNOSIS — R10.9 LEFT FLANK PAIN: Primary | ICD-10-CM

## 2018-06-14 DIAGNOSIS — R31.9 HEMATURIA, UNSPECIFIED TYPE: ICD-10-CM

## 2018-06-14 LAB
6-ACETYL MORPHINE: NEGATIVE
ALBUMIN SERPL-MCNC: 3.8 G/DL (ref 3.5–5)
ALBUMIN/GLOB SERPL: 1.6 G/DL (ref 1.5–2.5)
ALP SERPL-CCNC: 74 U/L (ref 35–104)
ALT SERPL W P-5'-P-CCNC: 30 U/L (ref 10–36)
AMPHET+METHAMPHET UR QL: NEGATIVE
ANION GAP SERPL CALCULATED.3IONS-SCNC: 5.3 MMOL/L (ref 3.6–11.2)
AST SERPL-CCNC: 26 U/L (ref 10–30)
BACTERIA UR QL AUTO: ABNORMAL /HPF
BARBITURATES UR QL SCN: NEGATIVE
BASOPHILS # BLD AUTO: 0 10*3/MM3 (ref 0–0.3)
BASOPHILS NFR BLD AUTO: 0 % (ref 0–2)
BENZODIAZ UR QL SCN: NEGATIVE
BILIRUB SERPL-MCNC: 0.4 MG/DL (ref 0.2–1.8)
BILIRUB UR QL STRIP: NEGATIVE
BUN BLD-MCNC: 12 MG/DL (ref 7–21)
BUN/CREAT SERPL: 19.4 (ref 7–25)
BUPRENORPHINE SERPL-MCNC: NEGATIVE NG/ML
CALCIUM SPEC-SCNC: 8.4 MG/DL (ref 7.7–10)
CANNABINOIDS SERPL QL: POSITIVE
CHLORIDE SERPL-SCNC: 106 MMOL/L (ref 99–112)
CLARITY UR: ABNORMAL
CO2 SERPL-SCNC: 27.7 MMOL/L (ref 24.3–31.9)
COCAINE UR QL: NEGATIVE
COLOR UR: ABNORMAL
CREAT BLD-MCNC: 0.62 MG/DL (ref 0.43–1.29)
DEPRECATED RDW RBC AUTO: 43.6 FL (ref 37–54)
EOSINOPHIL # BLD AUTO: 0.09 10*3/MM3 (ref 0–0.7)
EOSINOPHIL NFR BLD AUTO: 1.4 % (ref 0–5)
ERYTHROCYTE [DISTWIDTH] IN BLOOD BY AUTOMATED COUNT: 14.5 % (ref 11.5–14.5)
GFR SERPL CREATININE-BSD FRML MDRD: 105 ML/MIN/1.73
GLOBULIN UR ELPH-MCNC: 2.4 GM/DL
GLUCOSE BLD-MCNC: 81 MG/DL (ref 70–110)
GLUCOSE UR STRIP-MCNC: NEGATIVE MG/DL
HCT VFR BLD AUTO: 38.3 % (ref 37–47)
HGB BLD-MCNC: 12.4 G/DL (ref 12–16)
HGB UR QL STRIP.AUTO: ABNORMAL
HYALINE CASTS UR QL AUTO: ABNORMAL /LPF
IMM GRANULOCYTES # BLD: 0.03 10*3/MM3 (ref 0–0.03)
IMM GRANULOCYTES NFR BLD: 0.5 % (ref 0–0.5)
KETONES UR QL STRIP: NEGATIVE
LEUKOCYTE ESTERASE UR QL STRIP.AUTO: ABNORMAL
LYMPHOCYTES # BLD AUTO: 1.66 10*3/MM3 (ref 1–3)
LYMPHOCYTES NFR BLD AUTO: 25.9 % (ref 21–51)
MCH RBC QN AUTO: 27.3 PG (ref 27–33)
MCHC RBC AUTO-ENTMCNC: 32.4 G/DL (ref 33–37)
MCV RBC AUTO: 84.2 FL (ref 80–94)
METHADONE UR QL SCN: NEGATIVE
MONOCYTES # BLD AUTO: 0.53 10*3/MM3 (ref 0.1–0.9)
MONOCYTES NFR BLD AUTO: 8.3 % (ref 0–10)
NEUTROPHILS # BLD AUTO: 4.09 10*3/MM3 (ref 1.4–6.5)
NEUTROPHILS NFR BLD AUTO: 63.9 % (ref 30–70)
NITRITE UR QL STRIP: NEGATIVE
OPIATES UR QL: POSITIVE
OSMOLALITY SERPL CALC.SUM OF ELEC: 276.3 MOSM/KG (ref 273–305)
OXYCODONE UR QL SCN: NEGATIVE
PCP UR QL SCN: NEGATIVE
PH UR STRIP.AUTO: 6 [PH] (ref 5–8)
PLATELET # BLD AUTO: 126 10*3/MM3 (ref 130–400)
PMV BLD AUTO: 11.1 FL (ref 6–10)
POTASSIUM BLD-SCNC: 3.6 MMOL/L (ref 3.5–5.3)
PROT SERPL-MCNC: 6.2 G/DL (ref 6–8)
PROT UR QL STRIP: NEGATIVE
RBC # BLD AUTO: 4.55 10*6/MM3 (ref 4.2–5.4)
RBC # UR: ABNORMAL /HPF
REF LAB TEST METHOD: ABNORMAL
SODIUM BLD-SCNC: 139 MMOL/L (ref 135–153)
SP GR UR STRIP: 1.02 (ref 1–1.03)
SQUAMOUS #/AREA URNS HPF: ABNORMAL /HPF
UROBILINOGEN UR QL STRIP: ABNORMAL
WBC NRBC COR # BLD: 6.4 10*3/MM3 (ref 4.5–12.5)
WBC UR QL AUTO: ABNORMAL /HPF

## 2018-06-14 PROCEDURE — 80053 COMPREHEN METABOLIC PANEL: CPT | Performed by: PHYSICIAN ASSISTANT

## 2018-06-14 PROCEDURE — 25010000002 BUTORPHANOL PER 1 MG: Performed by: EMERGENCY MEDICINE

## 2018-06-14 PROCEDURE — 74176 CT ABD & PELVIS W/O CONTRAST: CPT | Performed by: RADIOLOGY

## 2018-06-14 PROCEDURE — 96376 TX/PRO/DX INJ SAME DRUG ADON: CPT

## 2018-06-14 PROCEDURE — 80307 DRUG TEST PRSMV CHEM ANLYZR: CPT | Performed by: PHYSICIAN ASSISTANT

## 2018-06-14 PROCEDURE — 85025 COMPLETE CBC W/AUTO DIFF WBC: CPT | Performed by: PHYSICIAN ASSISTANT

## 2018-06-14 PROCEDURE — 96374 THER/PROPH/DIAG INJ IV PUSH: CPT

## 2018-06-14 PROCEDURE — 36415 COLL VENOUS BLD VENIPUNCTURE: CPT

## 2018-06-14 PROCEDURE — 25010000002 PROMETHAZINE PER 50 MG: Performed by: EMERGENCY MEDICINE

## 2018-06-14 PROCEDURE — 81001 URINALYSIS AUTO W/SCOPE: CPT | Performed by: PHYSICIAN ASSISTANT

## 2018-06-14 PROCEDURE — 25010000002 HEPARIN FLUSH (PORCINE) 100 UNIT/ML SOLUTION: Performed by: PHYSICIAN ASSISTANT

## 2018-06-14 PROCEDURE — 74176 CT ABD & PELVIS W/O CONTRAST: CPT

## 2018-06-14 PROCEDURE — 99284 EMERGENCY DEPT VISIT MOD MDM: CPT

## 2018-06-14 PROCEDURE — 96375 TX/PRO/DX INJ NEW DRUG ADDON: CPT

## 2018-06-14 RX ORDER — PROMETHAZINE HYDROCHLORIDE 25 MG/ML
6.25 INJECTION, SOLUTION INTRAMUSCULAR; INTRAVENOUS ONCE
Status: COMPLETED | OUTPATIENT
Start: 2018-06-14 | End: 2018-06-14

## 2018-06-14 RX ORDER — SODIUM CHLORIDE 0.9 % (FLUSH) 0.9 %
10 SYRINGE (ML) INJECTION AS NEEDED
Status: DISCONTINUED | OUTPATIENT
Start: 2018-06-14 | End: 2018-06-14 | Stop reason: HOSPADM

## 2018-06-14 RX ORDER — BUTORPHANOL TARTRATE 1 MG/ML
1 INJECTION, SOLUTION INTRAMUSCULAR; INTRAVENOUS ONCE
Status: COMPLETED | OUTPATIENT
Start: 2018-06-14 | End: 2018-06-14

## 2018-06-14 RX ADMIN — BUTORPHANOL TARTRATE 1 MG: 1 INJECTION, SOLUTION INTRAMUSCULAR; INTRAVENOUS at 13:24

## 2018-06-14 RX ADMIN — PROMETHAZINE HYDROCHLORIDE 6.25 MG: 25 INJECTION INTRAMUSCULAR; INTRAVENOUS at 13:25

## 2018-06-14 RX ADMIN — BUTORPHANOL TARTRATE 1 MG: 1 INJECTION, SOLUTION INTRAMUSCULAR; INTRAVENOUS at 14:56

## 2018-06-14 RX ADMIN — PROMETHAZINE HYDROCHLORIDE 6.25 MG: 25 INJECTION INTRAMUSCULAR; INTRAVENOUS at 14:56

## 2018-06-14 RX ADMIN — SODIUM CHLORIDE, PRESERVATIVE FREE 300 UNITS: 5 INJECTION INTRAVENOUS at 15:38

## 2018-06-14 NOTE — ED PROVIDER NOTES
Subjective     History provided by:  Patient   used: No    Flank Pain   Pain location:  L flank  Pain quality: sharp    Pain radiates to:  Does not radiate  Pain severity:  Moderate  Onset quality:  Sudden  Duration:  1 day  Timing:  Constant  Progression:  Unchanged  Chronicity:  Recurrent (h/o kidney stones )  Context: not eating, not recent illness and not suspicious food intake    Relieved by:  Nothing  Worsened by:  Urination, palpation and movement  Ineffective treatments:  None tried  Associated symptoms: dysuria, hematuria, nausea and vomiting    Associated symptoms: no chest pain, no constipation, no cough, no diarrhea, no fever, no shortness of breath and no sore throat    Risk factors: no NSAID use and not obese        Review of Systems   Constitutional: Negative for activity change and fever.   HENT: Negative for congestion, rhinorrhea and sore throat.    Eyes: Negative for pain.   Respiratory: Negative for cough, shortness of breath and wheezing.    Cardiovascular: Negative for chest pain.   Gastrointestinal: Positive for nausea and vomiting. Negative for abdominal distention, constipation and diarrhea.   Genitourinary: Positive for difficulty urinating, dysuria, flank pain and hematuria.   Musculoskeletal: Negative for arthralgias and myalgias.   Skin: Negative for rash and wound.   Neurological: Negative for dizziness and headaches.   Psychiatric/Behavioral: Negative for agitation.   All other systems reviewed and are negative.      Past Medical History:   Diagnosis Date   • Abdominal pain    • Abdominal swelling    • Hoyos's disease    • Bipolar 1 disorder    • Brain tumor     R Frontal Lobe per pt   • Brain tumor 2014   • Brain tumor (benign)    • Constipation    • DDD (degenerative disc disease), cervical 05/29/2017   • DDD (degenerative disc disease), cervical    • Diarrhea    • Fibromyalgia    • IBS (irritable bowel syndrome)    • Migraine    • Nausea & vomiting    • PONV  (postoperative nausea and vomiting)    • PTSD (post-traumatic stress disorder)    • Rectal bleeding        Allergies   Allergen Reactions   • Ativan [Lorazepam] Hallucinations     confusion   • Sulfa Antibiotics Shortness Of Breath and Swelling   • Compazine [Prochlorperazine Edisylate] Hives   • Demerol [Meperidine] Hives   • Droperidol Itching   • Metoclopramide Swelling   • Toradol [Ketorolac Tromethamine] Hives and Itching   • Zofran [Ondansetron Hcl] Rash       Past Surgical History:   Procedure Laterality Date   • ANAL SCOPE N/A 7/28/2016    Procedure: ANAL SCOPE;  Surgeon: Kael Lopez MD;  Location: [facility] OR;  Service:    • APPENDECTOMY     • COLONOSCOPY N/A 6/30/2016    Procedure: COLONOSCOPY  CPTCODE:50369;  Surgeon: Jose Antonio Belle III, MD;  Location: [facility] OR;  Service:    • COLONOSCOPY N/A 7/7/2016    Procedure: COLONOSCOPY (74933) CPT;  Surgeon: Jose Antonio Belle III, MD;  Location: [facility] OR;  Service:    • CYSTOSCOPY RETROGRADE PYELOGRAM Bilateral 4/28/2017    Procedure: CYSTOSCOPY RETROGRADE PYELOGRAM;  Surgeon: Mu Blunt MD;  Location: [facility] OR;  Service:    • ENDOSCOPY N/A 6/30/2016    Procedure: ESOPHAGOGASTRODUODENOSCOPY WITH BIOPSY  CPTCODE:99486;  Surgeon: Jose Atnonio Belle III, MD;  Location: [facility] OR;  Service:    • HEMORRHOIDECTOMY N/A 7/28/2016    Procedure: HEMORRHOID STAPLING;  Surgeon: Kael Lopez MD;  Location: [facility] OR;  Service:    • HYSTERECTOMY     • KNEE SURGERY     • LAPAROSCOPIC SALPINGOOPHERECTOMY     • PORTACATH PLACEMENT N/A 7/28/2017    Procedure: INSERTION OF PORTACATH;  Surgeon: Celso Arredondo MD;  Location: [facility] OR;  Service:    • SHOULDER SURGERY      3 times       Family History   Problem Relation Age of Onset   • Crohn's disease Other    • Hypertension Other    • Diabetes Other    • Irritable bowel syndrome Other        Social History     Social History   • Marital status:      Social History Main Topics   • Smoking  status: Former Smoker     Packs/day: 1.00     Years: 20.00     Quit date: 11/1/2015   • Smokeless tobacco: Never Used   • Alcohol use No   • Drug use: Yes     Types: Marijuana      Comment: for pain   • Sexual activity: Defer     Other Topics Concern   • Not on file     Social History Narrative    Lives in [place], works as a , independent of [employer]           Objective   Physical Exam   Constitutional: She is oriented to person, place, and time. She appears well-developed and well-nourished.   HENT:   Head: Normocephalic and atraumatic.   Eyes: EOM are normal. Pupils are equal, round, and reactive to light.   Neck: Normal range of motion. Neck supple.   Cardiovascular: Normal rate, regular rhythm and normal heart sounds.    Pulmonary/Chest: Effort normal and breath sounds normal.   Abdominal: Soft. Bowel sounds are normal.   Musculoskeletal: Normal range of motion.        Arms:  Neurological: She is alert and oriented to person, place, and time.   Skin: Skin is warm and dry.   Psychiatric: She has a normal mood and affect. Her behavior is normal. Judgment and thought content normal.   Nursing note and vitals reviewed.      Procedures           ED Course                  MDM  Number of Diagnoses or Management Options     Amount and/or Complexity of Data Reviewed  Clinical lab tests: ordered and reviewed  Tests in the radiology section of CPT®: ordered and reviewed  Tests in the medicine section of CPT®: reviewed and ordered  Independent visualization of images, tracings, or specimens: yes    Patient Progress  Patient progress: stable        Final diagnoses:   Left flank pain   Hematuria, unspecified type            TAMI Vicente  06/14/18 1137

## 2018-06-18 ENCOUNTER — HOSPITAL ENCOUNTER (EMERGENCY)
Facility: HOSPITAL | Age: 43
Discharge: HOME | End: 2018-06-18
Admitting: EMERGENCY MEDICINE

## 2018-06-18 VITALS
BODY MASS INDEX: 24.88 KG/M2 | DIASTOLIC BLOOD PRESSURE: 73 MMHG | RESPIRATION RATE: 16 BRPM | TEMPERATURE: 97.3 F | HEIGHT: 69 IN | WEIGHT: 168 LBS | HEART RATE: 73 BPM | OXYGEN SATURATION: 98 % | SYSTOLIC BLOOD PRESSURE: 108 MMHG

## 2018-06-18 DIAGNOSIS — R51.9 SINUS HEADACHE: ICD-10-CM

## 2018-06-18 DIAGNOSIS — J01.00 ACUTE MAXILLARY SINUSITIS, RECURRENCE NOT SPECIFIED: Primary | ICD-10-CM

## 2018-06-18 PROCEDURE — 25010000002 DIPHENHYDRAMINE PER 50 MG: Performed by: PHYSICIAN ASSISTANT

## 2018-06-18 PROCEDURE — 63710000001 PROMETHAZINE PER 25 MG: Performed by: PHYSICIAN ASSISTANT

## 2018-06-18 PROCEDURE — 99283 EMERGENCY DEPT VISIT LOW MDM: CPT

## 2018-06-18 PROCEDURE — 25010000002 DEXAMETHASONE PER 1 MG: Performed by: PHYSICIAN ASSISTANT

## 2018-06-18 PROCEDURE — 96372 THER/PROPH/DIAG INJ SC/IM: CPT

## 2018-06-18 RX ORDER — DIPHENHYDRAMINE HYDROCHLORIDE 50 MG/ML
25 INJECTION INTRAMUSCULAR; INTRAVENOUS EVERY 6 HOURS PRN
Status: DISCONTINUED | OUTPATIENT
Start: 2018-06-18 | End: 2018-06-18 | Stop reason: HOSPADM

## 2018-06-18 RX ORDER — DEXAMETHASONE SODIUM PHOSPHATE 4 MG/ML
4 INJECTION, SOLUTION INTRA-ARTICULAR; INTRALESIONAL; INTRAMUSCULAR; INTRAVENOUS; SOFT TISSUE ONCE
Status: COMPLETED | OUTPATIENT
Start: 2018-06-18 | End: 2018-06-18

## 2018-06-18 RX ORDER — FEXOFENADINE HCL 180 MG/1
180 TABLET ORAL DAILY
Qty: 30 TABLET | Refills: 0 | OUTPATIENT
Start: 2018-06-18 | End: 2019-01-11

## 2018-06-18 RX ORDER — PROMETHAZINE HYDROCHLORIDE 25 MG/1
6.25 TABLET ORAL ONCE
Status: COMPLETED | OUTPATIENT
Start: 2018-06-18 | End: 2018-06-18

## 2018-06-18 RX ORDER — AMOXICILLIN AND CLAVULANATE POTASSIUM 875; 125 MG/1; MG/1
1 TABLET, FILM COATED ORAL 2 TIMES DAILY
Qty: 20 TABLET | Refills: 0 | Status: SHIPPED | OUTPATIENT
Start: 2018-06-18 | End: 2018-12-05

## 2018-06-18 RX ORDER — GUAIFENESIN 600 MG/1
600 TABLET, EXTENDED RELEASE ORAL EVERY 12 HOURS SCHEDULED
Qty: 28 TABLET | Refills: 0 | Status: SHIPPED | OUTPATIENT
Start: 2018-06-18 | End: 2020-03-25

## 2018-06-18 RX ADMIN — PROMETHAZINE HYDROCHLORIDE 6.25 MG: 25 TABLET ORAL at 15:46

## 2018-06-18 RX ADMIN — DIPHENHYDRAMINE HYDROCHLORIDE 25 MG: 50 INJECTION INTRAMUSCULAR; INTRAVENOUS at 15:45

## 2018-06-18 RX ADMIN — DEXAMETHASONE SODIUM PHOSPHATE 4 MG: 4 INJECTION, SOLUTION INTRA-ARTICULAR; INTRALESIONAL; INTRAMUSCULAR; INTRAVENOUS; SOFT TISSUE at 15:45

## 2018-06-18 NOTE — ED PROVIDER NOTES
Subjective     History provided by:  Patient   used: No    Headache   Pain location:  Generalized  Quality:  Dull  Radiates to:  Does not radiate  Severity currently:  10/10  Severity at highest:  10/10  Duration:  4 days  Timing:  Constant  Progression:  Waxing and waning  Chronicity:  New  Similar to prior headaches: yes    Relieved by:  Nothing  Worsened by:  Activity  Ineffective treatments:  None tried  Associated symptoms: facial pain and sinus pressure    Associated symptoms: no abdominal pain, no fever and no neck stiffness    Risk factors: sedentary lifestyle        Review of Systems   Constitutional: Negative.  Negative for fever.   HENT: Positive for sinus pain and sinus pressure.    Respiratory: Negative.    Cardiovascular: Negative.  Negative for chest pain.   Gastrointestinal: Negative.  Negative for abdominal pain.   Endocrine: Negative.    Genitourinary: Negative.  Negative for dysuria.   Musculoskeletal: Negative for neck stiffness.   Skin: Negative.    Neurological: Positive for headaches.   Psychiatric/Behavioral: Negative.    All other systems reviewed and are negative.      Past Medical History:   Diagnosis Date   • Abdominal pain    • Abdominal swelling    • Hoyos's disease    • Bipolar 1 disorder    • Brain tumor     R Frontal Lobe per pt   • Brain tumor 2014   • Brain tumor (benign)    • Constipation    • DDD (degenerative disc disease), cervical 05/29/2017   • DDD (degenerative disc disease), cervical    • Diarrhea    • Fibromyalgia    • IBS (irritable bowel syndrome)    • Migraine    • Nausea & vomiting    • PONV (postoperative nausea and vomiting)    • PTSD (post-traumatic stress disorder)    • Rectal bleeding        Allergies   Allergen Reactions   • Ativan [Lorazepam] Hallucinations     confusion   • Sulfa Antibiotics Shortness Of Breath and Swelling   • Compazine [Prochlorperazine Edisylate] Hives   • Demerol [Meperidine] Hives   • Droperidol Itching   •  Metoclopramide Swelling   • Toradol [Ketorolac Tromethamine] Hives and Itching   • Zofran [Ondansetron Hcl] Rash       Past Surgical History:   Procedure Laterality Date   • ANAL SCOPE N/A 7/28/2016    Procedure: ANAL SCOPE;  Surgeon: Kael Lopez MD;  Location: [facility] OR;  Service:    • APPENDECTOMY     • COLONOSCOPY N/A 6/30/2016    Procedure: COLONOSCOPY  CPTCODE:10126;  Surgeon: Jose Antonio Belle III, MD;  Location: [facility] OR;  Service:    • COLONOSCOPY N/A 7/7/2016    Procedure: COLONOSCOPY (11184) CPT;  Surgeon: Jose Antonio Belle III, MD;  Location: [facility] OR;  Service:    • CYSTOSCOPY RETROGRADE PYELOGRAM Bilateral 4/28/2017    Procedure: CYSTOSCOPY RETROGRADE PYELOGRAM;  Surgeon: Mu Blunt MD;  Location: [facility] OR;  Service:    • ENDOSCOPY N/A 6/30/2016    Procedure: ESOPHAGOGASTRODUODENOSCOPY WITH BIOPSY  CPTCODE:18694;  Surgeon: Jose Antonio Belle III, MD;  Location: [facility] OR;  Service:    • HEMORRHOIDECTOMY N/A 7/28/2016    Procedure: HEMORRHOID STAPLING;  Surgeon: Kael Lopez MD;  Location: [facility] OR;  Service:    • HYSTERECTOMY     • KNEE SURGERY     • LAPAROSCOPIC SALPINGOOPHERECTOMY     • PORTACATH PLACEMENT N/A 7/28/2017    Procedure: INSERTION OF PORTACATH;  Surgeon: Celso Arredondo MD;  Location: [facility] OR;  Service:    • SHOULDER SURGERY      3 times       Family History   Problem Relation Age of Onset   • Crohn's disease Other    • Hypertension Other    • Diabetes Other    • Irritable bowel syndrome Other        Social History     Social History   • Marital status:      Social History Main Topics   • Smoking status: Former Smoker     Packs/day: 1.00     Years: 20.00     Quit date: 11/1/2015   • Smokeless tobacco: Never Used   • Alcohol use No   • Drug use: Yes     Types: Marijuana      Comment: for pain   • Sexual activity: Defer     Other Topics Concern   • Not on file     Social History Narrative    Lives in [place], works as a , independent of ADL            Objective   Physical Exam   Constitutional: She is oriented to person, place, and time. She appears well-developed and well-nourished. No distress.   HENT:   Head: Normocephalic and atraumatic.   Right Ear: External ear normal.   Left Ear: External ear normal.   Nose: Right sinus exhibits maxillary sinus tenderness. Left sinus exhibits maxillary sinus tenderness.   Eyes: Conjunctivae and EOM are normal. Pupils are equal, round, and reactive to light.   Neck: Normal range of motion. Neck supple. No JVD present. No tracheal deviation present.   Cardiovascular: Normal rate, regular rhythm and normal heart sounds.    No murmur heard.  Pulmonary/Chest: Effort normal and breath sounds normal. No respiratory distress. She has no wheezes.   Abdominal: Soft. Bowel sounds are normal. There is no tenderness.   Musculoskeletal: Normal range of motion. She exhibits no edema or deformity.   Neurological: She is alert and oriented to person, place, and time. No cranial nerve deficit.   Skin: Skin is warm and dry. No rash noted. She is not diaphoretic. No erythema. No pallor.   Psychiatric: She has a normal mood and affect. Her behavior is normal. Thought content normal.   Nursing note and vitals reviewed.      Procedures           ED Course                  MDM  Number of Diagnoses or Management Options  Acute maxillary sinusitis, recurrence not specified: new and does not require workup  Sinus headache: new and does not require workup  Risk of Complications, Morbidity, and/or Mortality  Presenting problems: moderate  Diagnostic procedures: minimal  Management options: moderate    Patient Progress  Patient progress: stable        Final diagnoses:   Acute maxillary sinusitis, recurrence not specified   Sinus headache            Han Varghese PA-C  06/19/18 0234

## 2018-06-24 ENCOUNTER — HOSPITAL ENCOUNTER (EMERGENCY)
Facility: HOSPITAL | Age: 43
Discharge: HOME | End: 2018-06-24
Admitting: INTERNAL MEDICINE

## 2018-06-24 VITALS
OXYGEN SATURATION: 99 % | TEMPERATURE: 97.4 F | WEIGHT: 155 LBS | SYSTOLIC BLOOD PRESSURE: 145 MMHG | BODY MASS INDEX: 23.49 KG/M2 | HEART RATE: 80 BPM | HEIGHT: 68 IN | RESPIRATION RATE: 19 BRPM | DIASTOLIC BLOOD PRESSURE: 85 MMHG

## 2018-06-24 DIAGNOSIS — G43.909 MIGRAINE WITHOUT STATUS MIGRAINOSUS, NOT INTRACTABLE, UNSPECIFIED MIGRAINE TYPE: Primary | ICD-10-CM

## 2018-06-24 PROCEDURE — 25010000002 DIHYDROERGOTAMINE MESYLATE PER 1 MG: Performed by: NURSE PRACTITIONER

## 2018-06-24 PROCEDURE — 25010000002 BUTORPHANOL PER 1 MG: Performed by: NURSE PRACTITIONER

## 2018-06-24 PROCEDURE — 99282 EMERGENCY DEPT VISIT SF MDM: CPT

## 2018-06-24 PROCEDURE — 25010000002 PROMETHAZINE PER 50 MG: Performed by: NURSE PRACTITIONER

## 2018-06-24 PROCEDURE — 96372 THER/PROPH/DIAG INJ SC/IM: CPT

## 2018-06-24 RX ORDER — DIHYDROERGOTAMINE MESYLATE 1 MG/ML
1 INJECTION, SOLUTION INTRAMUSCULAR; INTRAVENOUS; SUBCUTANEOUS ONCE
Status: COMPLETED | OUTPATIENT
Start: 2018-06-24 | End: 2018-06-24

## 2018-06-24 RX ORDER — PROMETHAZINE HYDROCHLORIDE 25 MG/ML
12.5 INJECTION, SOLUTION INTRAMUSCULAR; INTRAVENOUS ONCE
Status: COMPLETED | OUTPATIENT
Start: 2018-06-24 | End: 2018-06-24

## 2018-06-24 RX ADMIN — PROMETHAZINE HYDROCHLORIDE 12.5 MG: 25 INJECTION INTRAMUSCULAR; INTRAVENOUS at 11:50

## 2018-06-24 RX ADMIN — BUTORPHANOL TARTRATE 2 MG: 2 INJECTION, SOLUTION INTRAMUSCULAR; INTRAVENOUS at 11:49

## 2018-06-24 RX ADMIN — DIHYDROERGOTAMINE MESYLATE 1 MG: 1 INJECTION, SOLUTION INTRAMUSCULAR; INTRAVENOUS; SUBCUTANEOUS at 11:56

## 2018-06-25 ENCOUNTER — HOSPITAL ENCOUNTER (EMERGENCY)
Facility: HOSPITAL | Age: 43
Discharge: HOME | End: 2018-06-25
Attending: EMERGENCY MEDICINE | Admitting: EMERGENCY MEDICINE

## 2018-06-25 VITALS
HEART RATE: 71 BPM | HEIGHT: 69 IN | BODY MASS INDEX: 25.03 KG/M2 | TEMPERATURE: 97.9 F | WEIGHT: 169 LBS | RESPIRATION RATE: 16 BRPM | OXYGEN SATURATION: 97 % | SYSTOLIC BLOOD PRESSURE: 117 MMHG | DIASTOLIC BLOOD PRESSURE: 62 MMHG

## 2018-06-25 DIAGNOSIS — G43.901 STATUS MIGRAINOSUS: Primary | ICD-10-CM

## 2018-06-25 LAB
ALBUMIN SERPL-MCNC: 3.9 G/DL (ref 3.2–4.8)
ALBUMIN/GLOB SERPL: 1.6 G/DL (ref 1.5–2.5)
ALP SERPL-CCNC: 62 U/L (ref 25–100)
ALT SERPL W P-5'-P-CCNC: 45 U/L (ref 7–40)
ANION GAP SERPL CALCULATED.3IONS-SCNC: 7 MMOL/L (ref 3–11)
AST SERPL-CCNC: 32 U/L (ref 0–33)
BASOPHILS # BLD AUTO: 0.01 10*3/MM3 (ref 0–0.2)
BASOPHILS NFR BLD AUTO: 0.2 % (ref 0–1)
BILIRUB SERPL-MCNC: 0.6 MG/DL (ref 0.3–1.2)
BUN BLD-MCNC: 11 MG/DL (ref 9–23)
BUN/CREAT SERPL: 19 (ref 7–25)
CALCIUM SPEC-SCNC: 8.5 MG/DL (ref 8.7–10.4)
CHLORIDE SERPL-SCNC: 108 MMOL/L (ref 99–109)
CO2 SERPL-SCNC: 26 MMOL/L (ref 20–31)
CREAT BLD-MCNC: 0.58 MG/DL (ref 0.6–1.3)
DEPRECATED RDW RBC AUTO: 44.3 FL (ref 37–54)
EOSINOPHIL # BLD AUTO: 0.09 10*3/MM3 (ref 0–0.3)
EOSINOPHIL NFR BLD AUTO: 1.6 % (ref 0–3)
ERYTHROCYTE [DISTWIDTH] IN BLOOD BY AUTOMATED COUNT: 14.7 % (ref 11.3–14.5)
GFR SERPL CREATININE-BSD FRML MDRD: 113 ML/MIN/1.73
GLOBULIN UR ELPH-MCNC: 2.5 GM/DL
GLUCOSE BLD-MCNC: 83 MG/DL (ref 70–100)
HCT VFR BLD AUTO: 35.9 % (ref 34.5–44)
HGB BLD-MCNC: 11.5 G/DL (ref 11.5–15.5)
IMM GRANULOCYTES # BLD: 0.01 10*3/MM3 (ref 0–0.03)
IMM GRANULOCYTES NFR BLD: 0.2 % (ref 0–0.6)
LYMPHOCYTES # BLD AUTO: 2.4 10*3/MM3 (ref 0.6–4.8)
LYMPHOCYTES NFR BLD AUTO: 43.9 % (ref 24–44)
MCH RBC QN AUTO: 26.6 PG (ref 27–31)
MCHC RBC AUTO-ENTMCNC: 32 G/DL (ref 32–36)
MCV RBC AUTO: 83.1 FL (ref 80–99)
MONOCYTES # BLD AUTO: 0.41 10*3/MM3 (ref 0–1)
MONOCYTES NFR BLD AUTO: 7.5 % (ref 0–12)
NEUTROPHILS # BLD AUTO: 2.56 10*3/MM3 (ref 1.5–8.3)
NEUTROPHILS NFR BLD AUTO: 46.8 % (ref 41–71)
PLATELET # BLD AUTO: 116 10*3/MM3 (ref 150–450)
PMV BLD AUTO: 10.3 FL (ref 6–12)
POTASSIUM BLD-SCNC: 3.8 MMOL/L (ref 3.5–5.5)
PROT SERPL-MCNC: 6.4 G/DL (ref 5.7–8.2)
RBC # BLD AUTO: 4.32 10*6/MM3 (ref 3.89–5.14)
SODIUM BLD-SCNC: 141 MMOL/L (ref 132–146)
WBC NRBC COR # BLD: 5.47 10*3/MM3 (ref 3.5–10.8)

## 2018-06-25 PROCEDURE — 96374 THER/PROPH/DIAG INJ IV PUSH: CPT

## 2018-06-25 PROCEDURE — 25010000002 PROMETHAZINE PER 50 MG: Performed by: PHYSICIAN ASSISTANT

## 2018-06-25 PROCEDURE — 80053 COMPREHEN METABOLIC PANEL: CPT | Performed by: PHYSICIAN ASSISTANT

## 2018-06-25 PROCEDURE — 25010000002 BUTORPHANOL PER 1 MG: Performed by: PHYSICIAN ASSISTANT

## 2018-06-25 PROCEDURE — 25010000002 BUTORPHANOL PER 1 MG: Performed by: EMERGENCY MEDICINE

## 2018-06-25 PROCEDURE — 96376 TX/PRO/DX INJ SAME DRUG ADON: CPT

## 2018-06-25 PROCEDURE — 96361 HYDRATE IV INFUSION ADD-ON: CPT

## 2018-06-25 PROCEDURE — 25010000002 HEPARIN FLUSH (PORCINE) 100 UNIT/ML SOLUTION: Performed by: EMERGENCY MEDICINE

## 2018-06-25 PROCEDURE — 25010000002 DEXAMETHASONE: Performed by: PHYSICIAN ASSISTANT

## 2018-06-25 PROCEDURE — 99284 EMERGENCY DEPT VISIT MOD MDM: CPT

## 2018-06-25 PROCEDURE — 96375 TX/PRO/DX INJ NEW DRUG ADDON: CPT

## 2018-06-25 PROCEDURE — 85025 COMPLETE CBC W/AUTO DIFF WBC: CPT | Performed by: PHYSICIAN ASSISTANT

## 2018-06-25 PROCEDURE — 25010000002 DIHYDROERGOTAMINE MESYLATE PER 1 MG: Performed by: PHYSICIAN ASSISTANT

## 2018-06-25 RX ORDER — BUTORPHANOL TARTRATE 1 MG/ML
1 INJECTION, SOLUTION INTRAMUSCULAR; INTRAVENOUS ONCE
Status: COMPLETED | OUTPATIENT
Start: 2018-06-25 | End: 2018-06-25

## 2018-06-25 RX ORDER — SODIUM CHLORIDE 0.9 % (FLUSH) 0.9 %
10 SYRINGE (ML) INJECTION AS NEEDED
Status: DISCONTINUED | OUTPATIENT
Start: 2018-06-25 | End: 2018-06-25 | Stop reason: HOSPADM

## 2018-06-25 RX ORDER — DIHYDROERGOTAMINE MESYLATE 1 MG/ML
1 INJECTION, SOLUTION INTRAMUSCULAR; INTRAVENOUS; SUBCUTANEOUS ONCE
Status: COMPLETED | OUTPATIENT
Start: 2018-06-25 | End: 2018-06-25

## 2018-06-25 RX ORDER — PROMETHAZINE HYDROCHLORIDE 25 MG/ML
12.5 INJECTION, SOLUTION INTRAMUSCULAR; INTRAVENOUS ONCE
Status: COMPLETED | OUTPATIENT
Start: 2018-06-25 | End: 2018-06-25

## 2018-06-25 RX ADMIN — PROMETHAZINE HYDROCHLORIDE 12.5 MG: 25 INJECTION INTRAMUSCULAR; INTRAVENOUS at 16:20

## 2018-06-25 RX ADMIN — HEPARIN 300 UNITS: 100 SYRINGE at 20:13

## 2018-06-25 RX ADMIN — BUTORPHANOL TARTRATE 1 MG: 1 INJECTION, SOLUTION INTRAMUSCULAR; INTRAVENOUS at 19:04

## 2018-06-25 RX ADMIN — DEXAMETHASONE SODIUM PHOSPHATE 10 MG: 10 INJECTION INTRAMUSCULAR; INTRAVENOUS at 16:29

## 2018-06-25 RX ADMIN — BUTORPHANOL TARTRATE 1 MG: 1 INJECTION, SOLUTION INTRAMUSCULAR; INTRAVENOUS at 16:35

## 2018-06-25 RX ADMIN — DIHYDROERGOTAMINE MESYLATE 1 MG: 1 INJECTION, SOLUTION INTRAMUSCULAR; INTRAVENOUS; SUBCUTANEOUS at 16:20

## 2018-06-25 RX ADMIN — SODIUM CHLORIDE 1000 ML: 9 INJECTION, SOLUTION INTRAVENOUS at 16:20

## 2018-06-26 ENCOUNTER — OFFICE VISIT (OUTPATIENT)
Dept: UROLOGY | Facility: CLINIC | Age: 43
End: 2018-06-26

## 2018-06-26 VITALS — WEIGHT: 169.09 LBS | BODY MASS INDEX: 25.04 KG/M2 | HEIGHT: 69 IN

## 2018-06-26 DIAGNOSIS — IMO0002 URETHRAL STENOSIS: Primary | ICD-10-CM

## 2018-06-26 PROCEDURE — 53661 DILATION OF URETHRA: CPT | Performed by: UROLOGY

## 2018-06-26 PROCEDURE — 96372 THER/PROPH/DIAG INJ SC/IM: CPT | Performed by: UROLOGY

## 2018-06-26 PROCEDURE — 99213 OFFICE O/P EST LOW 20 MIN: CPT | Performed by: UROLOGY

## 2018-06-26 RX ORDER — GENTAMICIN SULFATE 40 MG/ML
80 INJECTION, SOLUTION INTRAMUSCULAR; INTRAVENOUS ONCE
Status: COMPLETED | OUTPATIENT
Start: 2018-06-26 | End: 2018-06-26

## 2018-06-26 RX ORDER — HYDROCODONE BITARTRATE AND ACETAMINOPHEN 7.5; 325 MG/1; MG/1
1 TABLET ORAL 2 TIMES DAILY PRN
Qty: 6 TABLET | Refills: 0 | OUTPATIENT
Start: 2018-06-26 | End: 2020-02-09 | Stop reason: HOSPADM

## 2018-06-26 RX ADMIN — GENTAMICIN SULFATE 80 MG: 40 INJECTION, SOLUTION INTRAMUSCULAR; INTRAVENOUS at 13:06

## 2018-06-26 NOTE — PROGRESS NOTES
Chief Complaint:          Chief Complaint   Patient presents with   • URETHERAL STENOSIS     DILATATION       HPI:   43 y.o. female.  43 year old white female. Accompanied by  with IgA nephropathy, hepatitis C, severe urethral stenosis who presents for urethral dilation.  She is desirous of more pain medication but I explained to her carefully as long as she is on hydrocodone and I will not give her any more pain medication.  And if she's not on it she can only have several pills around the time of her dilation.  I also explained to her there is no permanent fix of the situation other than self dilatation which she refuses to do.Today she wants to learn to do intermittent catheter.  She gets pain medication from her primary care provider and she will be given pain medication only for short supply after painful dilatation this is been discussed with her primary care physician he will have dilatation no more than every 6 weeks in this office.  She returns today for follow-up.  She is now doing dramatically better.  She is now catheterizing herself intermittently.  It's helped dramatically I went ahead after prep and drape in a sterile fashion and dilated her sequentially to 24 Scottish which was the max she can take.  There were no complications.  Overall, she's doing well, she is not gotten into see the nephrologist as yet for inexplicable reasons.  I reaffirmed the importance of a workup of her IgA nephropathy.  She is here for follow-up she wants to be dilated.  I went ahead and recommended this.  She was dilated without complication to 26 Scottish.  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  He also complains of significant loss of libido and requests a  testosterone injection.  And a long discussion of the complications including clitorimegaly, hair growth, nipple hair growth etc. and she wishes to proceed.  She got a nice response from a testosterone and wants it repeated at a low dose she's here for dilation which was accomplished without complication  She returns today for dilation.  Additionally she felt that testosterone worked well but unfortunately we don't do it in the office anymore and she's going to see her primary care physician for a prescription for Premarin which she was on previously and worked dramatically well    Past Medical History:        Past Medical History:   Diagnosis Date   • Abdominal pain    • Abdominal swelling    • Hoyos's disease    • Bipolar 1 disorder    • Brain tumor     R Frontal Lobe per pt   • Brain tumor 2014   • Brain tumor (benign)    • Constipation    • DDD (degenerative disc disease), cervical 05/29/2017   • DDD (degenerative disc disease), cervical    • Diarrhea    • Fibromyalgia    • IBS (irritable bowel syndrome)    • Migraine    • Nausea & vomiting    • PONV (postoperative nausea and vomiting)    • PTSD (post-traumatic stress disorder)    • Rectal bleeding          Current Meds:     Current Outpatient Prescriptions   Medication Sig Dispense Refill   • albuterol (PROVENTIL HFA;VENTOLIN HFA) 108 (90 Base) MCG/ACT inhaler Inhale 2 puffs 2 (Two) Times a Day.     • amoxicillin-clavulanate (AUGMENTIN) 875-125 MG per tablet Take 1 tablet by mouth 2 (Two) Times a Day. 20 tablet 0   • Azelastine HCl 137 MCG/SPRAY solution 1 spray into each nostril As Needed (Allergies).     • busPIRone (BUSPAR) 15 MG tablet Take 15 mg by mouth 3 (three) times a day        • butorphanol (STADOL) 10 MG/ML nasal spray 1 spray into each nostril Daily.     • cetirizine (zyrTEC) 10 MG tablet Take 1 tablet by mouth Daily. 30 tablet 0   • dicyclomine (BENTYL) 10 MG capsule Take 10 mg by mouth 3 (Three) Times a Day.     • divalproex (DEPAKOTE) 500 MG   tablet Take 1 tablet by mouth 3 (Three) Times a Day. 90 tablet 2   • fexofenadine (ALLEGRA ALLERGY) 180 MG tablet Take 1 tablet by mouth Daily. 30 tablet 0   • fluticasone (VERAMYST) 27.5 MCG/SPRAY nasal spray 2 sprays into each nostril Daily.     • guaiFENesin (MUCINEX) 600 MG 12 hr tablet Take 1 tablet by mouth Every 12 (Twelve) Hours. 28 tablet 0   • HYDROcodone-acetaminophen (NORCO) 7.5-325 MG per tablet Take 1 tablet by mouth 2 (Two) Times a Day As Needed for Moderate Pain .     • montelukast (SINGULAIR) 10 MG tablet Take 10 mg by mouth Every Night.     • pantoprazole (PROTONIX) 40 MG EC tablet Take 40 mg by mouth 2 (Two) Times a Day As Needed.     • phenazopyridine (PYRIDIUM) 100 MG tablet Take 100 mg by mouth 3 (Three) Times a Day As Needed for bladder spasms.     • sertraline (ZOLOFT) 100 MG tablet Take 100 mg by mouth 2 (Two) Times a Day.     • SUMAtriptan (IMITREX) 6 MG/0.5ML solution injection Inject 6 mg under the skin 1 (One) Time.       No current facility-administered medications for this visit.         Allergies:      Allergies   Allergen Reactions   • Ativan [Lorazepam] Hallucinations     confusion   • Sulfa Antibiotics Shortness Of Breath and Swelling   • Compazine [Prochlorperazine Edisylate] Hives   • Demerol [Meperidine] Hives   • Droperidol Itching   • Metoclopramide Swelling   • Toradol [Ketorolac Tromethamine] Hives and Itching   • Zofran [Ondansetron Hcl] Rash        Past Surgical History:     Past Surgical History:   Procedure Laterality Date   • ANAL SCOPE N/A 7/28/2016    Procedure: ANAL SCOPE;  Surgeon: Kael Lopez MD;  Location: [facility] OR;  Service:    • APPENDECTOMY     • COLONOSCOPY N/A 6/30/2016    Procedure: COLONOSCOPY  CPTCODE:35579;  Surgeon: Jose Antonio Belle III, MD;  Location: [facility] OR;  Service:    • COLONOSCOPY N/A 7/7/2016    Procedure: COLONOSCOPY (09337) CPT;  Surgeon: Jose Antonio Belle III, MD;  Location: [facility] OR;  Service:    • CYSTOSCOPY RETROGRADE PYELOGRAM  Bilateral 4/28/2017    Procedure: CYSTOSCOPY RETROGRADE PYELOGRAM;  Surgeon: Mu Blunt MD;  Location: [facility] OR;  Service:    • ENDOSCOPY N/A 6/30/2016    Procedure: ESOPHAGOGASTRODUODENOSCOPY WITH BIOPSY  CPTCODE:24161;  Surgeon: Jose Antonio Belle III, MD;  Location: [facility] OR;  Service:    • HEMORRHOIDECTOMY N/A 7/28/2016    Procedure: HEMORRHOID STAPLING;  Surgeon: Kael Lopez MD;  Location: [facility] OR;  Service:    • HYSTERECTOMY     • KNEE SURGERY     • LAPAROSCOPIC SALPINGOOPHERECTOMY     • PORTACATH PLACEMENT N/A 7/28/2017    Procedure: INSERTION OF PORTACATH;  Surgeon: Celso Arredondo MD;  Location: [facility];  Service:    • SHOULDER SURGERY      3 times         Social History:     Social History     Social History   • Marital status:      Spouse name: N/A   • Number of children: N/A   • Years of education: N/A     Occupational History   • Not on file.     Social History Main Topics   • Smoking status: Former Smoker     Packs/day: 1.00     Years: 20.00     Quit date: 11/1/2015   • Smokeless tobacco: Never Used   • Alcohol use No   • Drug use: Yes     Types: Marijuana      Comment: for pain   • Sexual activity: Defer     Other Topics Concern   • Not on file     Social History Narrative    Lives in [place], works as a , independent of ADL       Family History:     Family History   Problem Relation Age of Onset   • Crohn's disease Other    • Hypertension Other    • Diabetes Other    • Irritable bowel syndrome Other        Review of Systems:     Review of Systems   Constitutional: Negative.  Negative for activity change, appetite change, chills, diaphoresis, fatigue and unexpected weight change.   HENT: Negative for congestion, dental problem, drooling, ear discharge, ear pain, facial swelling, hearing loss, mouth sores, nosebleeds, postnasal drip, rhinorrhea, sinus pressure, sneezing, sore throat, tinnitus, trouble swallowing and voice change.    Eyes: Negative.  Negative for  photophobia, pain, discharge, redness, itching and visual disturbance.   Respiratory: Negative.  Negative for apnea, cough, choking, chest tightness, shortness of breath, wheezing and stridor.    Cardiovascular: Negative.  Negative for chest pain, palpitations and leg swelling.   Gastrointestinal: Negative.  Negative for abdominal distention, abdominal pain, anal bleeding, blood in stool, constipation, diarrhea, nausea, rectal pain and vomiting.   Endocrine: Negative.  Negative for cold intolerance, heat intolerance, polydipsia, polyphagia and polyuria.   Genitourinary: Positive for difficulty urinating.   Musculoskeletal: Negative.  Negative for arthralgias, back pain, gait problem, joint swelling, myalgias, neck pain and neck stiffness.   Skin: Negative.  Negative for color change, pallor, rash and wound.   Allergic/Immunologic: Negative.  Negative for environmental allergies, food allergies and immunocompromised state.   Neurological: Negative.  Negative for dizziness, tremors, seizures, syncope, facial asymmetry, speech difficulty, weakness, light-headedness, numbness and headaches.   Hematological: Negative.  Negative for adenopathy. Does not bruise/bleed easily.   Psychiatric/Behavioral: Negative for agitation, behavioral problems, confusion, decreased concentration, dysphoric mood, hallucinations, self-injury, sleep disturbance and suicidal ideas. The patient is not nervous/anxious and is not hyperactive.    All other systems reviewed and are negative.      Physical Exam:     Physical Exam   Constitutional: She appears well-developed and well-nourished.   HENT:   Head: Normocephalic and atraumatic.   Right Ear: External ear normal.   Left Ear: External ear normal.   Mouth/Throat: Oropharynx is clear and moist.   Eyes: Conjunctivae are normal. Pupils are equal, round, and reactive to light.   Cardiovascular: Normal rate, regular rhythm, normal heart sounds and intact distal pulses.    Pulmonary/Chest: Effort  normal and breath sounds normal.   Abdominal: Soft. Bowel sounds are normal. She exhibits no distension and no mass. There is no tenderness. There is no rebound and no guarding.   Genitourinary: Vagina normal. No vaginal discharge found.   Musculoskeletal: Normal range of motion.   Neurological: She is alert. She has normal reflexes.   Skin: Skin is warm and dry.   Psychiatric: She has a normal mood and affect. Her behavior is normal. Judgment and thought content normal.       I have reviewed the following portions of the patient's history: allergies, current medications, past family history, past medical history, past social history, past surgical history, problem list and ROS and confirm it's accurate.      Procedure:      Urethral dilation-after an appropriate informed consent the patient was brought to the procedure suite.  The urethra was gently anesthetized with 10 cc of 2% viscous Xylocaine jelly.  After an adequate period of topical anesthesia and dilated with [place] sounds from 16-30 [place] sequentially without complication the patient was given gentamicin as prophylaxis with 80 mg    Assessment/Plan:   Urethral stenosis-status post dilation  Hypoactive sexual desire disorder-referred to primary care for estrogen replacement therapy  Narcotic pain medication-patient has significant acute pain that I believe would be an indication for the use of narcotic pain medication.  I discussed the significant risks of pain medication and the fact that this will be a short only option and I will give her no more than a three-day supply of pain medication.  And I will not plan long-term medication and that this will be sent to a pain clinic if at all becomes necessary.  We discussed signing a pain medication agreement and the fact that we're going to run a state [facility] review to be sure the patient is not getting pain medication from elsewhere.  If this is the case we will not give pain medication.  As part of the  patient's treatment plan of there being prescribed a controlled substance with potential for abuse.  This patient has been wait aware of the appropriate dose of such medications including, the risk for somnolence, limited ability to drive and/or safety and the significant potential for overdose.  It is been made clear that these medications are for the prescribed patient only without concomitant use of alcohol or other sepsis unless prescribed by the medical provider.  Has completed prescribing agreement detailing the terms of continue prescribing him a controlled substance.  Including monitoring Luis Alberto ports, the possibility of urine drug screens and pedal counts.  The patient is aware that we reviewed LUIS ALBERTO reports on a regular basis and scan them into the chart.  History and physical examination exhibited continued safe and appropriate use of controlled substances.  Also discussed the fact that the new [place] legislation allows only a three-day prescription for pain medication.  In this situation he will be referred to a chronic pain clinic.     Patient's Body mass index is 24.96 kg/m². BMI is within normal parameters. No follow-up required.          This document has been electronically signed by VERENICE FINK MD June 26, 2018 1:05 PM

## 2018-07-08 ENCOUNTER — HOSPITAL ENCOUNTER (EMERGENCY)
Facility: HOSPITAL | Age: 43
Discharge: HOME | End: 2018-07-08
Attending: EMERGENCY MEDICINE | Admitting: EMERGENCY MEDICINE

## 2018-07-08 VITALS
RESPIRATION RATE: 16 BRPM | BODY MASS INDEX: 24.88 KG/M2 | SYSTOLIC BLOOD PRESSURE: 123 MMHG | HEIGHT: 69 IN | DIASTOLIC BLOOD PRESSURE: 75 MMHG | TEMPERATURE: 97.8 F | HEART RATE: 65 BPM | OXYGEN SATURATION: 99 % | WEIGHT: 168 LBS

## 2018-07-08 DIAGNOSIS — G43.009 MIGRAINE WITHOUT AURA AND WITHOUT STATUS MIGRAINOSUS, NOT INTRACTABLE: Primary | ICD-10-CM

## 2018-07-08 PROCEDURE — 25010000002 PROMETHAZINE PER 50 MG: Performed by: EMERGENCY MEDICINE

## 2018-07-08 PROCEDURE — 99283 EMERGENCY DEPT VISIT LOW MDM: CPT

## 2018-07-08 PROCEDURE — 96372 THER/PROPH/DIAG INJ SC/IM: CPT

## 2018-07-08 PROCEDURE — 25010000002 BUTORPHANOL PER 1 MG: Performed by: EMERGENCY MEDICINE

## 2018-07-08 RX ORDER — PROMETHAZINE HYDROCHLORIDE 25 MG/ML
12.5 INJECTION, SOLUTION INTRAMUSCULAR; INTRAVENOUS ONCE
Status: COMPLETED | OUTPATIENT
Start: 2018-07-08 | End: 2018-07-08

## 2018-07-08 RX ADMIN — BUTORPHANOL TARTRATE 2 MG: 2 INJECTION, SOLUTION INTRAMUSCULAR; INTRAVENOUS at 12:56

## 2018-07-08 RX ADMIN — PROMETHAZINE HYDROCHLORIDE 12.5 MG: 25 INJECTION INTRAMUSCULAR; INTRAVENOUS at 12:56

## 2018-07-08 NOTE — ED PROVIDER NOTES
Subjective   Pt states migraine started after father passed away last week. Has an appt with her neurologist tomorrow morning at 8:30 but needed some relief before then         History provided by:  Patient and spouse   used: No    Migraine   Pain location:  Generalized  Quality:  Sharp  Radiates to:  Does not radiate  Severity at highest:  10/10  Onset quality:  Gradual  Duration:  6 days  Timing:  Constant  Progression:  Unchanged  Chronicity:  Recurrent  Similar to prior headaches: yes    Context: activity, bright light and loud noise    Relieved by:  Nothing  Worsened by:  Activity, light and sound  Ineffective treatments:  None tried  Associated symptoms: nausea, photophobia and vomiting    Associated symptoms: no congestion, no cough, no diarrhea, no dizziness, no eye pain, no fever, no myalgias, no neck stiffness, no numbness, no paresthesias and no sore throat    Risk factors: no anger and no family hx of SAH        Review of Systems   Constitutional: Negative for activity change and fever.   HENT: Negative for congestion, rhinorrhea and sore throat.    Eyes: Positive for photophobia. Negative for pain.   Respiratory: Negative for cough, shortness of breath and wheezing.    Cardiovascular: Negative for chest pain.   Gastrointestinal: Positive for nausea and vomiting. Negative for abdominal distention and diarrhea.   Genitourinary: Negative for difficulty urinating and dysuria.   Musculoskeletal: Negative for arthralgias, myalgias and neck stiffness.   Skin: Negative for rash and wound.   Neurological: Negative for dizziness, numbness, headaches and paresthesias.   Psychiatric/Behavioral: Negative for agitation.   All other systems reviewed and are negative.      Past Medical History:   Diagnosis Date   • Abdominal pain    • Abdominal swelling    • Hoyos's disease    • Bipolar 1 disorder (CMS/HCC)    • Brain tumor (benign) (CMS/HCC)    • Brain tumor (CMS/HCC)     R Frontal Lobe per pt   •  Brain tumor (CMS/HCC) 2014   • Constipation    • DDD (degenerative disc disease), cervical 05/29/2017   • DDD (degenerative disc disease), cervical    • Diarrhea    • Fibromyalgia    • IBS (irritable bowel syndrome)    • Migraine    • Nausea & vomiting    • PONV (postoperative nausea and vomiting)    • PTSD (post-traumatic stress disorder)    • Rectal bleeding        Allergies   Allergen Reactions   • Ativan [Lorazepam] Hallucinations     confusion   • Sulfa Antibiotics Shortness Of Breath and Swelling   • Compazine [Prochlorperazine Edisylate] Hives   • Demerol [Meperidine] Hives   • Droperidol Itching   • Metoclopramide Swelling   • Toradol [Ketorolac Tromethamine] Hives and Itching   • Zofran [Ondansetron Hcl] Rash       Past Surgical History:   Procedure Laterality Date   • ANAL SCOPE N/A 7/28/2016    Procedure: ANAL SCOPE;  Surgeon: Kael Lopez MD;  Location: [facility] OR;  Service:    • APPENDECTOMY     • COLONOSCOPY N/A 6/30/2016    Procedure: COLONOSCOPY  CPTCODE:59505;  Surgeon: Jose Antonio Belle III, MD;  Location: [facility] OR;  Service:    • COLONOSCOPY N/A 7/7/2016    Procedure: COLONOSCOPY (91974) CPT;  Surgeon: Jose Antonio Belle III, MD;  Location: [facility] OR;  Service:    • CYSTOSCOPY RETROGRADE PYELOGRAM Bilateral 4/28/2017    Procedure: CYSTOSCOPY RETROGRADE PYELOGRAM;  Surgeon: Mu Blunt MD;  Location: [facility] OR;  Service:    • ENDOSCOPY N/A 6/30/2016    Procedure: ESOPHAGOGASTRODUODENOSCOPY WITH BIOPSY  CPTCODE:40588;  Surgeon: Jose Antonio Belle III, MD;  Location: [facility] OR;  Service:    • HEMORRHOIDECTOMY N/A 7/28/2016    Procedure: HEMORRHOID STAPLING;  Surgeon: Kael Lopez MD;  Location: [facility] OR;  Service:    • HYSTERECTOMY     • KNEE SURGERY     • LAPAROSCOPIC SALPINGOOPHERECTOMY     • PORTACATH PLACEMENT N/A 7/28/2017    Procedure: INSERTION OF PORTACATH;  Surgeon: Celso Arredondo MD;  Location: [facility] OR;  Service:    • SHOULDER SURGERY      3 times       Family  History   Problem Relation Age of Onset   • Crohn's disease Other    • Hypertension Other    • Diabetes Other    • Irritable bowel syndrome Other        Social History     Social History   • Marital status:      Social History Main Topics   • Smoking status: Former Smoker     Packs/day: 1.00     Years: 20.00     Quit date: 11/1/2015   • Smokeless tobacco: Never Used   • Alcohol use No   • Drug use: Yes     Types: Marijuana      Comment: for pain   • Sexual activity: Defer     Other Topics Concern   • Not on file     Social History Narrative    Lives in [place], works as a , independent of AD[employer]           Objective   Physical Exam   Constitutional: She is oriented to person, place, and time. She appears well-developed and well-nourished.   HENT:   Head: Normocephalic and atraumatic.   Eyes: EOM are normal. Pupils are equal, round, and reactive to light.   Neck: Normal range of motion. Neck supple.   Cardiovascular: Normal rate, regular rhythm and normal heart sounds.    Pulmonary/Chest: Effort normal and breath sounds normal.   Abdominal: Soft. Bowel sounds are normal.   Musculoskeletal: Normal range of motion.   Neurological: She is alert and oriented to person, place, and time.   Skin: Skin is warm and dry.   Psychiatric: She has a normal mood and affect. Her behavior is normal. Judgment and thought content normal.   Nursing note and vitals reviewed.      Procedures           ED Course                  MDM  Number of Diagnoses or Management Options     Amount and/or Complexity of Data Reviewed  Clinical lab tests: ordered and reviewed  Tests in the radiology section of CPT®: ordered and reviewed  Tests in the medicine section of CPT®: ordered and reviewed    Patient Progress  Patient progress: stable        Final diagnoses:   Migraine without aura and without status migrainosus, not intractable            TAMI Vicente  07/08/18 4401

## 2018-07-11 ENCOUNTER — APPOINTMENT (OUTPATIENT)
Dept: ULTRASOUND IMAGING | Facility: HOSPITAL | Age: 43
End: 2018-07-11

## 2018-07-11 ENCOUNTER — HOSPITAL ENCOUNTER (EMERGENCY)
Facility: HOSPITAL | Age: 43
Discharge: HOME | End: 2018-07-11
Attending: EMERGENCY MEDICINE | Admitting: EMERGENCY MEDICINE

## 2018-07-11 VITALS
HEART RATE: 77 BPM | RESPIRATION RATE: 18 BRPM | HEIGHT: 69 IN | WEIGHT: 168 LBS | DIASTOLIC BLOOD PRESSURE: 72 MMHG | TEMPERATURE: 98.2 F | OXYGEN SATURATION: 98 % | BODY MASS INDEX: 24.88 KG/M2 | SYSTOLIC BLOOD PRESSURE: 132 MMHG

## 2018-07-11 DIAGNOSIS — N30.01 ACUTE CYSTITIS WITH HEMATURIA: ICD-10-CM

## 2018-07-11 DIAGNOSIS — N93.9 VAGINAL BLEEDING: Primary | ICD-10-CM

## 2018-07-11 LAB
BACTERIA UR QL AUTO: ABNORMAL /HPF
BILIRUB UR QL STRIP: NEGATIVE
CLARITY UR: CLEAR
COLOR UR: ABNORMAL
GLUCOSE UR STRIP-MCNC: NEGATIVE MG/DL
HGB UR QL STRIP.AUTO: ABNORMAL
HYALINE CASTS UR QL AUTO: ABNORMAL /LPF
KETONES UR QL STRIP: NEGATIVE
LEUKOCYTE ESTERASE UR QL STRIP.AUTO: ABNORMAL
NITRITE UR QL STRIP: NEGATIVE
PH UR STRIP.AUTO: 5.5 [PH] (ref 5–8)
PROT UR QL STRIP: NEGATIVE
RBC # UR: ABNORMAL /HPF
REF LAB TEST METHOD: ABNORMAL
SP GR UR STRIP: 1.02 (ref 1–1.03)
SQUAMOUS #/AREA URNS HPF: ABNORMAL /HPF
UROBILINOGEN UR QL STRIP: ABNORMAL
WBC UR QL AUTO: ABNORMAL /HPF

## 2018-07-11 PROCEDURE — 25010000002 PROMETHAZINE PER 50 MG: Performed by: PHYSICIAN ASSISTANT

## 2018-07-11 PROCEDURE — 96375 TX/PRO/DX INJ NEW DRUG ADDON: CPT

## 2018-07-11 PROCEDURE — 96374 THER/PROPH/DIAG INJ IV PUSH: CPT

## 2018-07-11 PROCEDURE — 76856 US EXAM PELVIC COMPLETE: CPT | Performed by: RADIOLOGY

## 2018-07-11 PROCEDURE — 25010000002 BUTORPHANOL PER 1 MG: Performed by: PHYSICIAN ASSISTANT

## 2018-07-11 PROCEDURE — 76856 US EXAM PELVIC COMPLETE: CPT

## 2018-07-11 PROCEDURE — 99284 EMERGENCY DEPT VISIT MOD MDM: CPT

## 2018-07-11 PROCEDURE — 81001 URINALYSIS AUTO W/SCOPE: CPT | Performed by: PHYSICIAN ASSISTANT

## 2018-07-11 PROCEDURE — 25010000002 HEPARIN FLUSH (PORCINE) 100 UNIT/ML SOLUTION: Performed by: PHYSICIAN ASSISTANT

## 2018-07-11 RX ORDER — NITROFURANTOIN 25; 75 MG/1; MG/1
100 CAPSULE ORAL 2 TIMES DAILY
Qty: 14 CAPSULE | Refills: 0 | Status: SHIPPED | OUTPATIENT
Start: 2018-07-11 | End: 2018-07-18

## 2018-07-11 RX ORDER — PROMETHAZINE HYDROCHLORIDE 25 MG/ML
12.5 INJECTION, SOLUTION INTRAMUSCULAR; INTRAVENOUS ONCE
Status: COMPLETED | OUTPATIENT
Start: 2018-07-11 | End: 2018-07-11

## 2018-07-11 RX ORDER — SODIUM CHLORIDE 0.9 % (FLUSH) 0.9 %
10 SYRINGE (ML) INJECTION AS NEEDED
Status: DISCONTINUED | OUTPATIENT
Start: 2018-07-11 | End: 2018-07-11 | Stop reason: HOSPADM

## 2018-07-11 RX ADMIN — BUTORPHANOL TARTRATE 2 MG: 2 INJECTION, SOLUTION INTRAMUSCULAR; INTRAVENOUS at 15:17

## 2018-07-11 RX ADMIN — SODIUM CHLORIDE, PRESERVATIVE FREE 300 UNITS: 5 INJECTION INTRAVENOUS at 15:30

## 2018-07-11 RX ADMIN — PROMETHAZINE HYDROCHLORIDE 12.5 MG: 25 INJECTION, SOLUTION INTRAMUSCULAR; INTRAVENOUS at 15:17

## 2018-07-11 RX ADMIN — Medication 10 ML: at 15:18

## 2018-07-11 NOTE — ED PROVIDER NOTES
"Subjective   This is a 42 y/o female that comes in with c/c \"Pelvic pain and dysuria\" for past day. Patient does state that she has had some bloody discharge. Patient has had dysuria. Denies any associated fever, chills, body aches. Patient does state that she has had a complete hysterectomy in past. Denies any other associated symptoms at this time.         History provided by:  Patient   used: No    Female  Problem   Primary symptoms include pelvic pain, dysuria.  Primary symptoms include no genital lesions, no genital pain, no genital rash. There has been no fever. This is a new problem. The symptoms occur during urination. The discharge was bloody and mucoid. Associated symptoms include nausea. Pertinent negatives include no anorexia, no diaphoresis and no abdominal pain. She has tried nothing for the symptoms. The treatment provided no relief. Associated medical issues do not include STD, PID, herpes simplex, perineal abscess, hypermenorrhea, ovarian cysts, endometriosis or gynecological surgery.       Review of Systems   Constitutional: Negative for diaphoresis.   Gastrointestinal: Positive for nausea. Negative for abdominal distention, abdominal pain and anorexia.   Genitourinary: Positive for dysuria, pelvic pain and urgency.   All other systems reviewed and are negative.      Past Medical History:   Diagnosis Date   • Abdominal pain    • Abdominal swelling    • Hoyos's disease    • Bipolar 1 disorder (CMS/HCC)    • Brain tumor (benign) (CMS/HCC)    • Brain tumor (CMS/HCC)     R Frontal Lobe per pt   • Brain tumor (CMS/HCC) 2014   • Constipation    • DDD (degenerative disc disease), cervical 05/29/2017   • DDD (degenerative disc disease), cervical    • Diarrhea    • Fibromyalgia    • IBS (irritable bowel syndrome)    • Migraine    • Nausea & vomiting    • PONV (postoperative nausea and vomiting)    • PTSD (post-traumatic stress disorder)    • Rectal bleeding        Allergies   Allergen " Reactions   • Ativan [Lorazepam] Hallucinations     confusion   • Sulfa Antibiotics Shortness Of Breath and Swelling   • Compazine [Prochlorperazine Edisylate] Hives   • Demerol [Meperidine] Hives   • Droperidol Itching   • Metoclopramide Swelling   • Toradol [Ketorolac Tromethamine] Hives and Itching   • Zofran [Ondansetron Hcl] Rash       Past Surgical History:   Procedure Laterality Date   • ANAL SCOPE N/A 7/28/2016    Procedure: ANAL SCOPE;  Surgeon: Kael Lopez MD;  Location: [facility] OR;  Service:    • APPENDECTOMY     • COLONOSCOPY N/A 6/30/2016    Procedure: COLONOSCOPY  CPTCODE:26757;  Surgeon: Jose Antonio Belle III, MD;  Location: [facility] OR;  Service:    • COLONOSCOPY N/A 7/7/2016    Procedure: COLONOSCOPY (50487) CPT;  Surgeon: Jose Antonio Belle III, MD;  Location: [facility] OR;  Service:    • CYSTOSCOPY RETROGRADE PYELOGRAM Bilateral 4/28/2017    Procedure: CYSTOSCOPY RETROGRADE PYELOGRAM;  Surgeon: Mu Blunt MD;  Location: [facility] OR;  Service:    • ENDOSCOPY N/A 6/30/2016    Procedure: ESOPHAGOGASTRODUODENOSCOPY WITH BIOPSY  CPTCODE:33490;  Surgeon: Jose Antonio Belle III, MD;  Location: [facility] OR;  Service:    • HEMORRHOIDECTOMY N/A 7/28/2016    Procedure: HEMORRHOID STAPLING;  Surgeon: Kael Lopez MD;  Location: [facility] OR;  Service:    • HYSTERECTOMY     • KNEE SURGERY     • LAPAROSCOPIC SALPINGOOPHERECTOMY     • PORTACATH PLACEMENT N/A 7/28/2017    Procedure: INSERTION OF PORTACATH;  Surgeon: Celso Arredondo MD;  Location: [facility] OR;  Service:    • SHOULDER SURGERY      3 times       Family History   Problem Relation Age of Onset   • Crohn's disease Other    • Hypertension Other    • Diabetes Other    • Irritable bowel syndrome Other        Social History     Social History   • Marital status:      Social History Main Topics   • Smoking status: Former Smoker     Packs/day: 1.00     Years: 20.00     Quit date: 11/1/2015   • Smokeless tobacco: Never Used   • Alcohol use  No   • Drug use: Yes     Types: Marijuana      Comment: for pain   • Sexual activity: Defer     Other Topics Concern   • Not on file     Social History Narrative    Lives in [place], works as a , independent of ADL           Objective   Physical Exam   Constitutional: She is oriented to person, place, and time. She appears well-developed and well-nourished.   HENT:   Head: Normocephalic and atraumatic.   Right Ear: External ear normal.   Left Ear: External ear normal.   Nose: Nose normal.   Mouth/Throat: Oropharynx is clear and moist.   Eyes: Conjunctivae and EOM are normal. Pupils are equal, round, and reactive to light. Right eye exhibits no discharge. Left eye exhibits no discharge. No scleral icterus.   Neck: Normal range of motion. Neck supple. No JVD present. No tracheal deviation present. No thyromegaly present.   Cardiovascular: Normal rate, regular rhythm, normal heart sounds and intact distal pulses.  Exam reveals no friction rub.    No murmur heard.  Pulmonary/Chest: Effort normal and breath sounds normal. No stridor. No respiratory distress. She has no wheezes. She has no rales.   Abdominal: Soft. Normal appearance and bowel sounds are normal. There is generalized tenderness. There is guarding.   Musculoskeletal: Normal range of motion. She exhibits no edema, tenderness or deformity.   Neurological: She is alert and oriented to person, place, and time. She displays normal reflexes. No cranial nerve deficit. Coordination normal.   Skin: Skin is warm and dry. Capillary refill takes less than 2 seconds. No rash noted. No erythema. No pallor.   Psychiatric: She has a normal mood and affect. Her behavior is normal. Judgment and thought content normal.   Nursing note and vitals reviewed.      Procedures           ED Course                  MDM      Final diagnoses:   Vaginal bleeding   Acute cystitis with hematuria            Mark Stubbs PA-C  07/11/18 1520

## 2018-07-16 ENCOUNTER — HOSPITAL ENCOUNTER (EMERGENCY)
Facility: HOSPITAL | Age: 43
Discharge: HOME | End: 2018-07-16

## 2018-07-16 PROCEDURE — 99211 OFF/OP EST MAY X REQ PHY/QHP: CPT

## 2018-07-17 NOTE — ED NOTES
"As I was triaging a pt, I could hear this pt making comments as she walked past the triage desk to the  that she was leaving and would call her family dr that she wasn't \"sitting out here and waiting\" \"she said she's been sitting out here for 6 hours\" as she was referring to someone else in the lobby, pt did not stop at the desk and did not sign the ama form,  states pt wouldn't stop and kept walking out of er, pt ambulated out of er with family member without any difficulty, pt left prior to being triaged or seen by a provider, high volume at this time in er and in er [facility]     Melva Garrison RN  07/16/18 2002    "

## 2018-07-23 ENCOUNTER — HOSPITAL ENCOUNTER (EMERGENCY)
Facility: HOSPITAL | Age: 43
Discharge: HOME | End: 2018-07-23
Attending: EMERGENCY MEDICINE | Admitting: EMERGENCY MEDICINE

## 2018-07-23 VITALS
BODY MASS INDEX: 24.88 KG/M2 | DIASTOLIC BLOOD PRESSURE: 78 MMHG | OXYGEN SATURATION: 100 % | TEMPERATURE: 98 F | SYSTOLIC BLOOD PRESSURE: 122 MMHG | HEIGHT: 69 IN | HEART RATE: 80 BPM | WEIGHT: 168 LBS | RESPIRATION RATE: 18 BRPM

## 2018-07-23 DIAGNOSIS — N23 URETERAL COLIC: Primary | ICD-10-CM

## 2018-07-23 LAB
6-ACETYL MORPHINE: NEGATIVE
ALBUMIN SERPL-MCNC: 3.8 G/DL (ref 3.5–5)
ALBUMIN/GLOB SERPL: 1.5 G/DL (ref 1.5–2.5)
ALP SERPL-CCNC: 61 U/L (ref 35–104)
ALT SERPL W P-5'-P-CCNC: 57 U/L (ref 10–36)
AMPHET+METHAMPHET UR QL: NEGATIVE
ANION GAP SERPL CALCULATED.3IONS-SCNC: 3.3 MMOL/L (ref 3.6–11.2)
APTT PPP: 32.9 SECONDS (ref 23.8–36.1)
AST SERPL-CCNC: 48 U/L (ref 10–30)
B-HCG UR QL: NEGATIVE
BACTERIA UR QL AUTO: ABNORMAL /HPF
BARBITURATES UR QL SCN: NEGATIVE
BASOPHILS # BLD AUTO: 0 10*3/MM3 (ref 0–0.3)
BASOPHILS NFR BLD AUTO: 0 % (ref 0–2)
BENZODIAZ UR QL SCN: NEGATIVE
BILIRUB SERPL-MCNC: 0.5 MG/DL (ref 0.2–1.8)
BILIRUB UR QL STRIP: NEGATIVE
BUN BLD-MCNC: 11 MG/DL (ref 7–21)
BUN/CREAT SERPL: 14.1 (ref 7–25)
BUPRENORPHINE SERPL-MCNC: NEGATIVE NG/ML
CALCIUM SPEC-SCNC: 8.9 MG/DL (ref 7.7–10)
CANNABINOIDS SERPL QL: POSITIVE
CHLORIDE SERPL-SCNC: 107 MMOL/L (ref 99–112)
CLARITY UR: ABNORMAL
CO2 SERPL-SCNC: 28.7 MMOL/L (ref 24.3–31.9)
COCAINE UR QL: NEGATIVE
COLOR UR: ABNORMAL
CREAT BLD-MCNC: 0.78 MG/DL (ref 0.43–1.29)
DEPRECATED RDW RBC AUTO: 45.3 FL (ref 37–54)
EOSINOPHIL # BLD AUTO: 0.12 10*3/MM3 (ref 0–0.7)
EOSINOPHIL NFR BLD AUTO: 2.8 % (ref 0–5)
ERYTHROCYTE [DISTWIDTH] IN BLOOD BY AUTOMATED COUNT: 14.9 % (ref 11.5–14.5)
GFR SERPL CREATININE-BSD FRML MDRD: 81 ML/MIN/1.73
GLOBULIN UR ELPH-MCNC: 2.5 GM/DL
GLUCOSE BLD-MCNC: 81 MG/DL (ref 70–110)
GLUCOSE UR STRIP-MCNC: NEGATIVE MG/DL
HCT VFR BLD AUTO: 33.6 % (ref 37–47)
HGB BLD-MCNC: 11 G/DL (ref 12–16)
HGB UR QL STRIP.AUTO: ABNORMAL
HYALINE CASTS UR QL AUTO: ABNORMAL /LPF
IMM GRANULOCYTES # BLD: 0 10*3/MM3 (ref 0–0.03)
IMM GRANULOCYTES NFR BLD: 0 % (ref 0–0.5)
INR PPP: 1.01 (ref 0.9–1.1)
KETONES UR QL STRIP: ABNORMAL
LEUKOCYTE ESTERASE UR QL STRIP.AUTO: ABNORMAL
LYMPHOCYTES # BLD AUTO: 1.77 10*3/MM3 (ref 1–3)
LYMPHOCYTES NFR BLD AUTO: 41.5 % (ref 21–51)
MCH RBC QN AUTO: 27.5 PG (ref 27–33)
MCHC RBC AUTO-ENTMCNC: 32.7 G/DL (ref 33–37)
MCV RBC AUTO: 84 FL (ref 80–94)
METHADONE UR QL SCN: NEGATIVE
MONOCYTES # BLD AUTO: 0.31 10*3/MM3 (ref 0.1–0.9)
MONOCYTES NFR BLD AUTO: 7.3 % (ref 0–10)
NEUTROPHILS # BLD AUTO: 2.07 10*3/MM3 (ref 1.4–6.5)
NEUTROPHILS NFR BLD AUTO: 48.4 % (ref 30–70)
NITRITE UR QL STRIP: NEGATIVE
OPIATES UR QL: NEGATIVE
OSMOLALITY SERPL CALC.SUM OF ELEC: 276 MOSM/KG (ref 273–305)
OXYCODONE UR QL SCN: NEGATIVE
PCP UR QL SCN: NEGATIVE
PH UR STRIP.AUTO: 6.5 [PH] (ref 5–8)
PLATELET # BLD AUTO: 134 10*3/MM3 (ref 130–400)
PMV BLD AUTO: 10.8 FL (ref 6–10)
POTASSIUM BLD-SCNC: 3.7 MMOL/L (ref 3.5–5.3)
PROT SERPL-MCNC: 6.3 G/DL (ref 6–8)
PROT UR QL STRIP: ABNORMAL
PROTHROMBIN TIME: 13.6 SECONDS (ref 11–15.4)
RBC # BLD AUTO: 4 10*6/MM3 (ref 4.2–5.4)
RBC # UR: ABNORMAL /HPF
REF LAB TEST METHOD: ABNORMAL
SODIUM BLD-SCNC: 139 MMOL/L (ref 135–153)
SP GR UR STRIP: 1.03 (ref 1–1.03)
SQUAMOUS #/AREA URNS HPF: ABNORMAL /HPF
UROBILINOGEN UR QL STRIP: ABNORMAL
WBC NRBC COR # BLD: 4.27 10*3/MM3 (ref 4.5–12.5)
WBC UR QL AUTO: ABNORMAL /HPF

## 2018-07-23 PROCEDURE — 96361 HYDRATE IV INFUSION ADD-ON: CPT

## 2018-07-23 PROCEDURE — 80053 COMPREHEN METABOLIC PANEL: CPT | Performed by: EMERGENCY MEDICINE

## 2018-07-23 PROCEDURE — 81025 URINE PREGNANCY TEST: CPT | Performed by: EMERGENCY MEDICINE

## 2018-07-23 PROCEDURE — 25010000002 PROMETHAZINE PER 50 MG: Performed by: EMERGENCY MEDICINE

## 2018-07-23 PROCEDURE — 80307 DRUG TEST PRSMV CHEM ANLYZR: CPT | Performed by: EMERGENCY MEDICINE

## 2018-07-23 PROCEDURE — 85025 COMPLETE CBC W/AUTO DIFF WBC: CPT | Performed by: EMERGENCY MEDICINE

## 2018-07-23 PROCEDURE — 81001 URINALYSIS AUTO W/SCOPE: CPT | Performed by: EMERGENCY MEDICINE

## 2018-07-23 PROCEDURE — 96374 THER/PROPH/DIAG INJ IV PUSH: CPT

## 2018-07-23 PROCEDURE — 85730 THROMBOPLASTIN TIME PARTIAL: CPT | Performed by: EMERGENCY MEDICINE

## 2018-07-23 PROCEDURE — 25010000002 HEPARIN FLUSH (PORCINE) 100 UNIT/ML SOLUTION

## 2018-07-23 PROCEDURE — 25010000002 BUTORPHANOL PER 1 MG: Performed by: EMERGENCY MEDICINE

## 2018-07-23 PROCEDURE — 85610 PROTHROMBIN TIME: CPT | Performed by: EMERGENCY MEDICINE

## 2018-07-23 PROCEDURE — 99283 EMERGENCY DEPT VISIT LOW MDM: CPT

## 2018-07-23 PROCEDURE — 96375 TX/PRO/DX INJ NEW DRUG ADDON: CPT

## 2018-07-23 RX ORDER — PROMETHAZINE HYDROCHLORIDE 25 MG/1
25 SUPPOSITORY RECTAL EVERY 6 HOURS PRN
Qty: 12 SUPPOSITORY | Refills: 0 | OUTPATIENT
Start: 2018-07-23 | End: 2019-01-11

## 2018-07-23 RX ORDER — HYDROCODONE BITARTRATE AND ACETAMINOPHEN 7.5; 325 MG/1; MG/1
1 TABLET ORAL EVERY 4 HOURS PRN
Qty: 12 TABLET | Refills: 0 | OUTPATIENT
Start: 2018-07-23 | End: 2019-01-11

## 2018-07-23 RX ORDER — PROMETHAZINE HYDROCHLORIDE 12.5 MG/1
12.5 TABLET ORAL EVERY 6 HOURS PRN
Qty: 20 TABLET | Refills: 0 | OUTPATIENT
Start: 2018-07-23 | End: 2020-02-09 | Stop reason: HOSPADM

## 2018-07-23 RX ORDER — TAMSULOSIN HYDROCHLORIDE 0.4 MG/1
1 CAPSULE ORAL NIGHTLY
Qty: 30 CAPSULE | Refills: 0 | OUTPATIENT
Start: 2018-07-23 | End: 2019-01-11

## 2018-07-23 RX ORDER — SODIUM CHLORIDE 0.9 % (FLUSH) 0.9 %
10 SYRINGE (ML) INJECTION AS NEEDED
Status: DISCONTINUED | OUTPATIENT
Start: 2018-07-23 | End: 2018-07-23 | Stop reason: HOSPADM

## 2018-07-23 RX ADMIN — PROMETHAZINE HYDROCHLORIDE 12.5 MG: 25 INJECTION INTRAMUSCULAR; INTRAVENOUS at 03:49

## 2018-07-23 RX ADMIN — HEPARIN SODIUM (PORCINE) LOCK FLUSH IV SOLN 100 UNIT/ML 300 UNITS: 100 SOLUTION at 04:03

## 2018-07-23 RX ADMIN — SODIUM CHLORIDE 1000 ML: 9 INJECTION, SOLUTION INTRAVENOUS at 03:04

## 2018-07-23 RX ADMIN — BUTORPHANOL TARTRATE 1 MG: 2 INJECTION, SOLUTION INTRAMUSCULAR; INTRAVENOUS at 03:04

## 2018-07-25 ENCOUNTER — HOSPITAL ENCOUNTER (EMERGENCY)
Facility: HOSPITAL | Age: 43
Discharge: HOME | End: 2018-07-25
Attending: INTERNAL MEDICINE | Admitting: INTERNAL MEDICINE

## 2018-07-25 VITALS
OXYGEN SATURATION: 98 % | WEIGHT: 168 LBS | SYSTOLIC BLOOD PRESSURE: 114 MMHG | DIASTOLIC BLOOD PRESSURE: 76 MMHG | HEIGHT: 69 IN | HEART RATE: 68 BPM | RESPIRATION RATE: 17 BRPM | BODY MASS INDEX: 24.88 KG/M2 | TEMPERATURE: 97.6 F

## 2018-07-25 DIAGNOSIS — N23 URETERAL COLIC: Primary | ICD-10-CM

## 2018-07-25 LAB
ANION GAP SERPL CALCULATED.3IONS-SCNC: 5.1 MMOL/L (ref 3.6–11.2)
BACTERIA UR QL AUTO: ABNORMAL /HPF
BASOPHILS # BLD AUTO: 0.01 10*3/MM3 (ref 0–0.3)
BASOPHILS NFR BLD AUTO: 0.2 % (ref 0–2)
BILIRUB UR QL STRIP: NEGATIVE
BUN BLD-MCNC: 8 MG/DL (ref 7–21)
BUN/CREAT SERPL: 13.3 (ref 7–25)
CALCIUM SPEC-SCNC: 8.7 MG/DL (ref 7.7–10)
CHLORIDE SERPL-SCNC: 107 MMOL/L (ref 99–112)
CLARITY UR: CLEAR
CO2 SERPL-SCNC: 26.9 MMOL/L (ref 24.3–31.9)
COLOR UR: ABNORMAL
CREAT BLD-MCNC: 0.6 MG/DL (ref 0.43–1.29)
DEPRECATED RDW RBC AUTO: 43.3 FL (ref 37–54)
EOSINOPHIL # BLD AUTO: 0.05 10*3/MM3 (ref 0–0.7)
EOSINOPHIL NFR BLD AUTO: 1 % (ref 0–5)
ERYTHROCYTE [DISTWIDTH] IN BLOOD BY AUTOMATED COUNT: 14.8 % (ref 11.5–14.5)
GFR SERPL CREATININE-BSD FRML MDRD: 109 ML/MIN/1.73
GLUCOSE BLD-MCNC: 77 MG/DL (ref 70–110)
GLUCOSE UR STRIP-MCNC: NEGATIVE MG/DL
HCT VFR BLD AUTO: 35.4 % (ref 37–47)
HGB BLD-MCNC: 11.6 G/DL (ref 12–16)
HGB UR QL STRIP.AUTO: ABNORMAL
HYALINE CASTS UR QL AUTO: ABNORMAL /LPF
IMM GRANULOCYTES # BLD: 0.01 10*3/MM3 (ref 0–0.03)
IMM GRANULOCYTES NFR BLD: 0.2 % (ref 0–0.5)
KETONES UR QL STRIP: NEGATIVE
LEUKOCYTE ESTERASE UR QL STRIP.AUTO: NEGATIVE
LYMPHOCYTES # BLD AUTO: 1.58 10*3/MM3 (ref 1–3)
LYMPHOCYTES NFR BLD AUTO: 31.4 % (ref 21–51)
MCH RBC QN AUTO: 27 PG (ref 27–33)
MCHC RBC AUTO-ENTMCNC: 32.8 G/DL (ref 33–37)
MCV RBC AUTO: 82.5 FL (ref 80–94)
MONOCYTES # BLD AUTO: 0.25 10*3/MM3 (ref 0.1–0.9)
MONOCYTES NFR BLD AUTO: 5 % (ref 0–10)
NEUTROPHILS # BLD AUTO: 3.13 10*3/MM3 (ref 1.4–6.5)
NEUTROPHILS NFR BLD AUTO: 62.2 % (ref 30–70)
NITRITE UR QL STRIP: NEGATIVE
OSMOLALITY SERPL CALC.SUM OF ELEC: 274.7 MOSM/KG (ref 273–305)
PH UR STRIP.AUTO: 6.5 [PH] (ref 5–8)
PLATELET # BLD AUTO: 124 10*3/MM3 (ref 130–400)
PMV BLD AUTO: 10.9 FL (ref 6–10)
POTASSIUM BLD-SCNC: 3.8 MMOL/L (ref 3.5–5.3)
PROT UR QL STRIP: NEGATIVE
RBC # BLD AUTO: 4.29 10*6/MM3 (ref 4.2–5.4)
RBC # UR: ABNORMAL /HPF
REF LAB TEST METHOD: ABNORMAL
SODIUM BLD-SCNC: 139 MMOL/L (ref 135–153)
SP GR UR STRIP: 1.01 (ref 1–1.03)
SQUAMOUS #/AREA URNS HPF: ABNORMAL /HPF
UROBILINOGEN UR QL STRIP: ABNORMAL
WBC NRBC COR # BLD: 5.03 10*3/MM3 (ref 4.5–12.5)
WBC UR QL AUTO: ABNORMAL /HPF

## 2018-07-25 PROCEDURE — 80048 BASIC METABOLIC PNL TOTAL CA: CPT | Performed by: EMERGENCY MEDICINE

## 2018-07-25 PROCEDURE — 25010000002 PROMETHAZINE PER 50 MG: Performed by: EMERGENCY MEDICINE

## 2018-07-25 PROCEDURE — 25010000002 BUTORPHANOL PER 1 MG: Performed by: EMERGENCY MEDICINE

## 2018-07-25 PROCEDURE — 25010000002 HEPARIN FLUSH (PORCINE) 100 UNIT/ML SOLUTION: Performed by: PHYSICIAN ASSISTANT

## 2018-07-25 PROCEDURE — 96374 THER/PROPH/DIAG INJ IV PUSH: CPT

## 2018-07-25 PROCEDURE — 85025 COMPLETE CBC W/AUTO DIFF WBC: CPT | Performed by: EMERGENCY MEDICINE

## 2018-07-25 PROCEDURE — 96375 TX/PRO/DX INJ NEW DRUG ADDON: CPT

## 2018-07-25 PROCEDURE — 99283 EMERGENCY DEPT VISIT LOW MDM: CPT

## 2018-07-25 PROCEDURE — 81001 URINALYSIS AUTO W/SCOPE: CPT | Performed by: EMERGENCY MEDICINE

## 2018-07-25 RX ORDER — PROMETHAZINE HYDROCHLORIDE 25 MG/ML
6.25 INJECTION, SOLUTION INTRAMUSCULAR; INTRAVENOUS ONCE
Status: COMPLETED | OUTPATIENT
Start: 2018-07-25 | End: 2018-07-25

## 2018-07-25 RX ORDER — BUTORPHANOL TARTRATE 1 MG/ML
1 INJECTION, SOLUTION INTRAMUSCULAR; INTRAVENOUS ONCE
Status: COMPLETED | OUTPATIENT
Start: 2018-07-25 | End: 2018-07-25

## 2018-07-25 RX ADMIN — HEPARIN SODIUM (PORCINE) LOCK FLUSH IV SOLN 100 UNIT/ML 300 UNITS: 100 SOLUTION at 20:41

## 2018-07-25 RX ADMIN — PROMETHAZINE HYDROCHLORIDE 6.25 MG: 25 INJECTION INTRAMUSCULAR; INTRAVENOUS at 19:51

## 2018-07-25 RX ADMIN — BUTORPHANOL TARTRATE 1 MG: 1 INJECTION, SOLUTION INTRAMUSCULAR; INTRAVENOUS at 19:51

## 2018-07-25 RX ADMIN — SODIUM CHLORIDE 500 ML: 9 INJECTION, SOLUTION INTRAVENOUS at 19:51

## 2018-08-03 ENCOUNTER — HOSPITAL ENCOUNTER (EMERGENCY)
Facility: HOSPITAL | Age: 43
Discharge: HOME | End: 2018-08-03
Attending: EMERGENCY MEDICINE | Admitting: EMERGENCY MEDICINE

## 2018-08-03 VITALS
DIASTOLIC BLOOD PRESSURE: 72 MMHG | BODY MASS INDEX: 24.88 KG/M2 | SYSTOLIC BLOOD PRESSURE: 106 MMHG | TEMPERATURE: 98.1 F | WEIGHT: 168 LBS | HEART RATE: 64 BPM | HEIGHT: 69 IN | OXYGEN SATURATION: 98 % | RESPIRATION RATE: 18 BRPM

## 2018-08-03 DIAGNOSIS — G43.909 MIGRAINE WITHOUT STATUS MIGRAINOSUS, NOT INTRACTABLE, UNSPECIFIED MIGRAINE TYPE: Primary | ICD-10-CM

## 2018-08-03 PROCEDURE — 99283 EMERGENCY DEPT VISIT LOW MDM: CPT

## 2018-08-03 PROCEDURE — 96372 THER/PROPH/DIAG INJ SC/IM: CPT

## 2018-08-03 PROCEDURE — 25010000002 DEXAMETHASONE SODIUM PHOSPHATE 20 MG/5ML SOLUTION: Performed by: PHYSICIAN ASSISTANT

## 2018-08-03 PROCEDURE — 25010000002 BUTORPHANOL PER 1 MG: Performed by: PHYSICIAN ASSISTANT

## 2018-08-03 PROCEDURE — 25010000002 PROMETHAZINE PER 50 MG: Performed by: PHYSICIAN ASSISTANT

## 2018-08-03 PROCEDURE — 25010000002 DIHYDROERGOTAMINE MESYLATE PER 1 MG: Performed by: PHYSICIAN ASSISTANT

## 2018-08-03 RX ORDER — BUTORPHANOL TARTRATE 1 MG/ML
1 INJECTION, SOLUTION INTRAMUSCULAR; INTRAVENOUS EVERY 4 HOURS PRN
Status: COMPLETED | OUTPATIENT
Start: 2018-08-03 | End: 2018-08-03

## 2018-08-03 RX ORDER — DEXAMETHASONE SODIUM PHOSPHATE 4 MG/ML
10 INJECTION, SOLUTION INTRA-ARTICULAR; INTRALESIONAL; INTRAMUSCULAR; INTRAVENOUS; SOFT TISSUE ONCE
Status: COMPLETED | OUTPATIENT
Start: 2018-08-03 | End: 2018-08-03

## 2018-08-03 RX ORDER — PROMETHAZINE HYDROCHLORIDE 25 MG/ML
25 INJECTION, SOLUTION INTRAMUSCULAR; INTRAVENOUS ONCE
Status: COMPLETED | OUTPATIENT
Start: 2018-08-03 | End: 2018-08-03

## 2018-08-03 RX ORDER — DIHYDROERGOTAMINE MESYLATE 1 MG/ML
1 INJECTION, SOLUTION INTRAMUSCULAR; INTRAVENOUS; SUBCUTANEOUS ONCE
Status: COMPLETED | OUTPATIENT
Start: 2018-08-03 | End: 2018-08-03

## 2018-08-03 RX ADMIN — PROMETHAZINE HYDROCHLORIDE 25 MG: 25 INJECTION INTRAMUSCULAR; INTRAVENOUS at 21:51

## 2018-08-03 RX ADMIN — BUTORPHANOL TARTRATE 1 MG: 1 INJECTION, SOLUTION INTRAMUSCULAR; INTRAVENOUS at 21:52

## 2018-08-03 RX ADMIN — DEXAMETHASONE SODIUM PHOSPHATE 10 MG: 4 INJECTION, SOLUTION INTRA-ARTICULAR; INTRALESIONAL; INTRAMUSCULAR; INTRAVENOUS; SOFT TISSUE at 22:06

## 2018-08-03 RX ADMIN — DIHYDROERGOTAMINE MESYLATE 1 MG: 1 INJECTION, SOLUTION INTRAMUSCULAR; INTRAVENOUS; SUBCUTANEOUS at 22:31

## 2018-08-04 NOTE — ED PROVIDER NOTES
Subjective     History provided by:  Patient   used: No    Migraine   Pain location:  Generalized  Radiates to:  Does not radiate  Severity currently:  10/10  Severity at highest:  10/10  Duration:  3 days  Timing:  Constant  Progression:  Worsening  Chronicity:  New  Similar to prior headaches: yes    Context: emotional stress    Context: not eating    Context comment:  Pt states daughter tried killing herself 4 days ago and she has been under a lot of stress  Relieved by:  Nothing  Worsened by:  Activity, light and neck movement  Associated symptoms: nausea and vomiting    Associated symptoms: no abdominal pain, no blurred vision, no dizziness, no fever, no loss of balance, no neck pain and no weakness    Risk factors: sedentary lifestyle        Review of Systems   Constitutional: Negative.  Negative for fever.   Eyes: Negative for blurred vision.   Respiratory: Negative.    Cardiovascular: Negative.  Negative for chest pain.   Gastrointestinal: Positive for nausea and vomiting. Negative for abdominal pain.   Endocrine: Negative.    Genitourinary: Negative.  Negative for dysuria.   Musculoskeletal: Negative for neck pain.   Skin: Negative.    Neurological: Positive for headaches. Negative for dizziness, weakness and loss of balance.   Psychiatric/Behavioral: Negative.    All other systems reviewed and are negative.      Past Medical History:   Diagnosis Date   • Abdominal pain    • Abdominal swelling    • Hoyos's disease    • Bipolar 1 disorder (CMS/HCC)    • Brain tumor (benign) (CMS/HCC)    • Brain tumor (CMS/HCC)     R Frontal Lobe per pt   • Brain tumor (CMS/HCC) 2014   • Constipation    • DDD (degenerative disc disease), cervical 05/29/2017   • DDD (degenerative disc disease), cervical    • Diarrhea    • Fibromyalgia    • IBS (irritable bowel syndrome)    • Migraine    • Nausea & vomiting    • PONV (postoperative nausea and vomiting)    • PTSD (post-traumatic stress disorder)    • Rectal  bleeding        Allergies   Allergen Reactions   • Ativan [Lorazepam] Hallucinations     confusion   • Sulfa Antibiotics Shortness Of Breath and Swelling   • Compazine [Prochlorperazine Edisylate] Hives   • Demerol [Meperidine] Hives   • Droperidol Itching   • Metoclopramide Swelling   • Toradol [Ketorolac Tromethamine] Hives and Itching   • Zofran [Ondansetron Hcl] Rash       Past Surgical History:   Procedure Laterality Date   • ANAL SCOPE N/A 7/28/2016    Procedure: ANAL SCOPE;  Surgeon: Kael Lopez MD;  Location: [facility] OR;  Service:    • APPENDECTOMY     • COLONOSCOPY N/A 6/30/2016    Procedure: COLONOSCOPY  CPTCODE:[id number];  Surgeon: Jose Antonio Belle III, MD;  Location: [facility] OR;  Service:    • COLONOSCOPY N/A 7/7/2016    Procedure: COLONOSCOPY (92646) CPT;  Surgeon: Jose Antonio Belle III, MD;  Location: [facility] OR;  Service:    • CYSTOSCOPY RETROGRADE PYELOGRAM Bilateral 4/28/2017    Procedure: CYSTOSCOPY RETROGRADE PYELOGRAM;  Surgeon: Mu Blunt MD;  Location: [facility] OR;  Service:    • ENDOSCOPY N/A 6/30/2016    Procedure: ESOPHAGOGASTRODUODENOSCOPY WITH BIOPSY  CPTCODE:[id number];  Surgeon: Jose Antonio Belle III, MD;  Location: [facility] OR;  Service:    • HEMORRHOIDECTOMY N/A 7/28/2016    Procedure: HEMORRHOID STAPLING;  Surgeon: Kael Lopez MD;  Location: [facility] OR;  Service:    • HYSTERECTOMY     • KNEE SURGERY     • LAPAROSCOPIC SALPINGOOPHERECTOMY     • PORTACATH PLACEMENT N/A 7/28/2017    Procedure: INSERTION OF PORTACATH;  Surgeon: Celso Arredondo MD;  Location: [facility] OR;  Service:    • SHOULDER SURGERY      3 times       Family History   Problem Relation Age of Onset   • Crohn's disease Other    • Hypertension Other    • Diabetes Other    • Irritable bowel syndrome Other        Social History     Social History   • Marital status:      Social History Main Topics   • Smoking status: Former Smoker     Packs/day: 1.00     Years: 20.00     Quit date: 11/1/2015   •  Smokeless tobacco: Never Used   • Alcohol use No   • Drug use: Yes     Types: Marijuana      Comment: for pain   • Sexual activity: Defer     Other Topics Concern   • Not on file     Social History Narrative    Lives in [place], works as a , independent of AD[employer]           Objective   Physical Exam   Constitutional: She is oriented to person, place, and time. She appears well-developed and well-nourished. No distress.   HENT:   Head: Normocephalic and atraumatic.   Right Ear: External ear normal.   Left Ear: External ear normal.   Nose: Nose normal.   Eyes: Pupils are equal, round, and reactive to light. Conjunctivae and EOM are normal.   Neck: Normal range of motion. Neck supple. No JVD present. No neck rigidity. No tracheal deviation present.   Cardiovascular: Normal rate, regular rhythm and normal heart sounds.    No murmur heard.  Pulmonary/Chest: Effort normal and breath sounds normal. No respiratory distress. She has no wheezes.   Abdominal: Soft. Bowel sounds are normal. There is no tenderness.   Musculoskeletal: Normal range of motion. She exhibits no edema or deformity.   Neurological: She is alert and oriented to person, place, and time. She has normal strength. No cranial nerve deficit or sensory deficit. She displays a negative Romberg sign. GCS eye subscore is 4. GCS verbal subscore is 5. GCS motor subscore is 6.   Skin: Skin is warm and dry. No rash noted. She is not diaphoretic. No erythema. No pallor.   Psychiatric: She has a normal mood and affect. Her behavior is normal. Thought content normal.   Nursing note and vitals reviewed.      Procedures           ED Course                  MDM  Number of Diagnoses or Management Options  Migraine without status migrainosus, not intractable, unspecified migraine type: new and does not require workup  Risk of Complications, Morbidity, and/or Mortality  Presenting problems: moderate  Diagnostic procedures: minimal  Management options: moderate    Patient  Progress  Patient progress: stable        Final diagnoses:   Migraine without status migrainosus, not intractable, unspecified migraine type            Han Varghese PA-C  08/04/18 0048

## 2018-08-06 ENCOUNTER — HOSPITAL ENCOUNTER (EMERGENCY)
Facility: HOSPITAL | Age: 43
Discharge: HOME | End: 2018-08-07
Attending: EMERGENCY MEDICINE | Admitting: EMERGENCY MEDICINE

## 2018-08-06 VITALS
SYSTOLIC BLOOD PRESSURE: 103 MMHG | WEIGHT: 168 LBS | BODY MASS INDEX: 24.88 KG/M2 | OXYGEN SATURATION: 99 % | HEART RATE: 70 BPM | TEMPERATURE: 97.6 F | HEIGHT: 69 IN | RESPIRATION RATE: 16 BRPM | DIASTOLIC BLOOD PRESSURE: 55 MMHG

## 2018-08-06 DIAGNOSIS — G43.919 INTRACTABLE MIGRAINE WITHOUT STATUS MIGRAINOSUS, UNSPECIFIED MIGRAINE TYPE: Primary | ICD-10-CM

## 2018-08-06 PROCEDURE — 99283 EMERGENCY DEPT VISIT LOW MDM: CPT

## 2018-08-06 PROCEDURE — 96365 THER/PROPH/DIAG IV INF INIT: CPT

## 2018-08-06 PROCEDURE — 96375 TX/PRO/DX INJ NEW DRUG ADDON: CPT

## 2018-08-06 PROCEDURE — 25010000002 PROMETHAZINE PER 50 MG: Performed by: EMERGENCY MEDICINE

## 2018-08-06 PROCEDURE — 25010000002 BUTORPHANOL PER 1 MG: Performed by: EMERGENCY MEDICINE

## 2018-08-06 PROCEDURE — 25010000002 DEXAMETHASONE: Performed by: EMERGENCY MEDICINE

## 2018-08-06 RX ORDER — SODIUM CHLORIDE 0.9 % (FLUSH) 0.9 %
10 SYRINGE (ML) INJECTION AS NEEDED
Status: DISCONTINUED | OUTPATIENT
Start: 2018-08-06 | End: 2018-08-07 | Stop reason: HOSPADM

## 2018-08-06 RX ORDER — PROMETHAZINE HYDROCHLORIDE 25 MG/ML
12.5 INJECTION, SOLUTION INTRAMUSCULAR; INTRAVENOUS ONCE
Status: COMPLETED | OUTPATIENT
Start: 2018-08-06 | End: 2018-08-06

## 2018-08-06 RX ORDER — LORAZEPAM 2 MG/ML
1 INJECTION INTRAMUSCULAR ONCE
Status: COMPLETED | OUTPATIENT
Start: 2018-08-07 | End: 2018-08-07

## 2018-08-06 RX ADMIN — BUTORPHANOL TARTRATE 2 MG: 2 INJECTION, SOLUTION INTRAMUSCULAR; INTRAVENOUS at 23:02

## 2018-08-06 RX ADMIN — SODIUM CHLORIDE 500 ML: 9 INJECTION, SOLUTION INTRAVENOUS at 23:01

## 2018-08-06 RX ADMIN — PROMETHAZINE HYDROCHLORIDE 12.5 MG: 25 INJECTION INTRAMUSCULAR; INTRAVENOUS at 23:01

## 2018-08-06 RX ADMIN — DEXAMETHASONE SODIUM PHOSPHATE 10 MG: 10 INJECTION INTRAMUSCULAR; INTRAVENOUS at 23:02

## 2018-08-07 ENCOUNTER — OFFICE VISIT (OUTPATIENT)
Dept: UROLOGY | Facility: CLINIC | Age: 43
End: 2018-08-07

## 2018-08-07 VITALS — WEIGHT: 168 LBS | BODY MASS INDEX: 24.88 KG/M2 | HEIGHT: 69 IN

## 2018-08-07 DIAGNOSIS — F52.0 HYPOACTIVE SEXUAL DESIRE DISORDER: ICD-10-CM

## 2018-08-07 DIAGNOSIS — N39.0 URINARY TRACT INFECTION WITHOUT HEMATURIA, SITE UNSPECIFIED: ICD-10-CM

## 2018-08-07 DIAGNOSIS — IMO0002 URETHRAL STENOSIS: Primary | ICD-10-CM

## 2018-08-07 PROCEDURE — 96372 THER/PROPH/DIAG INJ SC/IM: CPT | Performed by: UROLOGY

## 2018-08-07 PROCEDURE — 99213 OFFICE O/P EST LOW 20 MIN: CPT | Performed by: UROLOGY

## 2018-08-07 PROCEDURE — 96375 TX/PRO/DX INJ NEW DRUG ADDON: CPT

## 2018-08-07 PROCEDURE — 25010000002 LORAZEPAM PER 2 MG: Performed by: EMERGENCY MEDICINE

## 2018-08-07 PROCEDURE — 53661 DILATION OF URETHRA: CPT | Performed by: UROLOGY

## 2018-08-07 RX ORDER — OXYCODONE AND ACETAMINOPHEN 10; 325 MG/1; MG/1
1 TABLET ORAL EVERY 6 HOURS PRN
Qty: 12 TABLET | Refills: 0 | Status: SHIPPED | OUTPATIENT
Start: 2018-08-07 | End: 2018-09-18 | Stop reason: SDUPTHER

## 2018-08-07 RX ORDER — GENTAMICIN SULFATE 40 MG/ML
80 INJECTION, SOLUTION INTRAMUSCULAR; INTRAVENOUS ONCE
Status: COMPLETED | OUTPATIENT
Start: 2018-08-07 | End: 2018-08-07

## 2018-08-07 RX ORDER — TESTOSTERONE GEL, 1% 10 MG/G
25 GEL TRANSDERMAL EVERY OTHER DAY
Qty: 5 G | Refills: 2 | OUTPATIENT
Start: 2018-08-07 | End: 2019-01-11

## 2018-08-07 RX ADMIN — LORAZEPAM 1 MG: 2 INJECTION INTRAMUSCULAR; INTRAVENOUS at 00:12

## 2018-08-07 RX ADMIN — GENTAMICIN SULFATE 80 MG: 40 INJECTION, SOLUTION INTRAMUSCULAR; INTRAVENOUS at 13:35

## 2018-08-07 NOTE — PROGRESS NOTES
Chief Complaint:          No chief complaint on file.      HPI:   43 y.o. female.  43 year old white female. Accompanied by  with IgA nephropathy, hepatitis C, severe urethral stenosis who presents for urethral dilation.  She is desirous of more pain medication but I explained to her carefully as long as she is on hydrocodone and I will not give her any more pain medication.  And if she's not on it she can only have several pills around the time of her dilation.  I also explained to her there is no permanent fix of the situation other than self dilatation which she refuses to do.Today she wants to learn to do intermittent catheter.  She gets pain medication from her primary care provider and she will be given pain medication only for short supply after painful dilatation this is been discussed with her primary care physician he will have dilatation no more than every 6 weeks in this office.  She returns today for follow-up.  She is now doing dramatically better.  She is now catheterizing herself intermittently.  It's helped dramatically I went ahead after prep and drape in a sterile fashion and dilated her sequentially to 24 Bulgarian which was the max she can take.  There were no complications.  Overall, she's doing well, she is not gotten into see the nephrologist as yet for inexplicable reasons.  I reaffirmed the importance of a workup of her IgA nephropathy.  She is here for follow-up she wants to be dilated.  I went ahead and recommended this.  She was dilated without complication to 26 Bulgarian.  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  He also complains of significant loss of libido and requests a testosterone injection.  And a long discussion of  the complications including clitorimegaly, hair growth, nipple hair growth etc. and she wishes to proceed.  She got a nice response from a testosterone and wants it repeated at a low dose she's here for dilation which was accomplished without complication  She returns today for dilation.  Additionally she felt that testosterone worked well but unfortunately we don't do it in the office anymore and she's going to see her primary care physician for a prescription for Premarin which she was on previously and worked dramatically well.  She's here for dilation and is done extremely well I went ahead and dilated to 28 French.  She does well with the topical testosterone I gave her prescription with the understanding is not to be reimbursed she is comfortable with that and a follow-up upper based on that she is to call for any additional problems from my standpoint    Past Medical History:        Past Medical History:   Diagnosis Date   • Abdominal pain    • Abdominal swelling    • Hoyos's disease    • Bipolar 1 disorder (CMS/HCC)    • Brain tumor (benign) (CMS/HCC)    • Brain tumor (CMS/HCC)     R Frontal Lobe per pt   • Brain tumor (CMS/HCC) 2014   • Constipation    • DDD (degenerative disc disease), cervical 05/29/2017   • DDD (degenerative disc disease), cervical    • Diarrhea    • Fibromyalgia    • IBS (irritable bowel syndrome)    • Migraine    • Nausea & vomiting    • PONV (postoperative nausea and vomiting)    • PTSD (post-traumatic stress disorder)    • Rectal bleeding          Current Meds:     Current Outpatient Prescriptions   Medication Sig Dispense Refill   • albuterol (PROVENTIL HFA;VENTOLIN HFA) 108 (90 Base) MCG/ACT inhaler Inhale 2 puffs 2 (Two) Times a Day.     • amoxicillin-clavulanate (AUGMENTIN) 875-125 MG per tablet Take 1 tablet by mouth 2 (Two) Times a Day. 20 tablet 0   • Azelastine HCl 137 MCG/SPRAY solution 1 spray into each nostril As Needed (Allergies).     • busPIRone (BUSPAR) 15 MG tablet  Take 15 mg by mouth 3 (three) times a day        • butorphanol (STADOL) 10 MG/ML nasal spray 1 spray into each nostril Daily.     • cetirizine (zyrTEC) 10 MG tablet Take 1 tablet by mouth Daily. 30 tablet 0   • dicyclomine (BENTYL) 10 MG capsule Take 10 mg by mouth 3 (Three) Times a Day.     • divalproex (DEPAKOTE) 500 MG DR tablet Take 1 tablet by mouth 3 (Three) Times a Day. 90 tablet 2   • fexofenadine (ALLEGRA ALLERGY) 180 MG tablet Take 1 tablet by mouth Daily. 30 tablet 0   • fluticasone (VERAMYST) 27.5 MCG/SPRAY nasal spray 2 sprays into each nostril Daily.     • guaiFENesin (MUCINEX) 600 MG 12 hr tablet Take 1 tablet by mouth Every 12 (Twelve) Hours. 28 tablet 0   • HYDROcodone-acetaminophen (NORCO) 7.5-325 MG per tablet Take 1 tablet by mouth 2 (Two) Times a Day As Needed for Moderate Pain . 6 tablet 0   • HYDROcodone-acetaminophen (NORCO) 7.5-325 MG per tablet Take 1 tablet by mouth Every 4 (Four) Hours As Needed for Moderate Pain . 12 tablet 0   • montelukast (SINGULAIR) 10 MG tablet Take 10 mg by mouth Every Night.     • pantoprazole (PROTONIX) 40 MG EC tablet Take 40 mg by mouth 2 (Two) Times a Day As Needed.     • phenazopyridine (PYRIDIUM) 100 MG tablet Take 100 mg by mouth 3 (Three) Times a Day As Needed for bladder spasms.     • promethazine (PHENERGAN) 12.5 MG tablet Take 1 tablet by mouth Every 6 (Six) Hours As Needed for Nausea or Vomiting. 20 tablet 0   • promethazine (PHENERGAN) 25 MG suppository Insert 1 suppository into the rectum Every 6 (Six) Hours As Needed for Nausea or Vomiting. 12 suppository 0   • sertraline (ZOLOFT) 100 MG tablet Take 100 mg by mouth 2 (Two) Times a Day.     • SUMAtriptan (IMITREX) 6 MG/0.5ML solution injection Inject 6 mg under the skin 1 (One) Time.     • tamsulosin (FLOMAX) 0.4 MG capsule 24 hr capsule Take 1 capsule by mouth Every Night. 30 capsule 0     No current facility-administered medications for this visit.         Allergies:      Allergies   Allergen  Reactions   • Ativan [Lorazepam] Hallucinations     confusion   • Sulfa Antibiotics Shortness Of Breath and Swelling   • Compazine [Prochlorperazine Edisylate] Hives   • Demerol [Meperidine] Hives   • Droperidol Itching   • Metoclopramide Swelling   • Toradol [Ketorolac Tromethamine] Hives and Itching   • Zofran [Ondansetron Hcl] Rash        Past Surgical History:     Past Surgical History:   Procedure Laterality Date   • ANAL SCOPE N/A 7/28/2016    Procedure: ANAL SCOPE;  Surgeon: Kael Lopez MD;  Location: [facility] OR;  Service:    • APPENDECTOMY     • COLONOSCOPY N/A 6/30/2016    Procedure: COLONOSCOPY  CPTCODE:12250;  Surgeon: Jose Antonio Belle III, MD;  Location: [facility] OR;  Service:    • COLONOSCOPY N/A 7/7/2016    Procedure: COLONOSCOPY (49716) CPT;  Surgeon: Jose Antonio Belle III, MD;  Location: [facility] OR;  Service:    • CYSTOSCOPY RETROGRADE PYELOGRAM Bilateral 4/28/2017    Procedure: CYSTOSCOPY RETROGRADE PYELOGRAM;  Surgeon: Mu Blunt MD;  Location: [facility] OR;  Service:    • ENDOSCOPY N/A 6/30/2016    Procedure: ESOPHAGOGASTRODUODENOSCOPY WITH BIOPSY  CPTCODE:78986;  Surgeon: Jose Antonio Belle III, MD;  Location: [facility] OR;  Service:    • HEMORRHOIDECTOMY N/A 7/28/2016    Procedure: HEMORRHOID STAPLING;  Surgeon: Kael Lopez MD;  Location: [facility] OR;  Service:    • HYSTERECTOMY     • KNEE SURGERY     • LAPAROSCOPIC SALPINGOOPHERECTOMY     • PORTACATH PLACEMENT N/A 7/28/2017    Procedure: INSERTION OF PORTACATH;  Surgeon: Celso Arredondo MD;  Location: [facility] OR;  Service:    • SHOULDER SURGERY      3 times         Social History:     Social History     Social History   • Marital status:      Spouse name: N/A   • Number of children: N/A   • Years of education: N/A     Occupational History   • Not on file.     Social History Main Topics   • Smoking status: Former Smoker     Packs/day: 1.00     Years: 20.00     Quit date: 11/1/2015   • Smokeless tobacco: Never Used   •  Alcohol use No   • Drug use: Yes     Types: Marijuana      Comment: for pain   • Sexual activity: Defer     Other Topics Concern   • Not on file     Social History Narrative    Lives in [place], works as a , independent of ADL       Family History:     Family History   Problem Relation Age of Onset   • Crohn's disease Other    • Hypertension Other    • Diabetes Other    • Irritable bowel syndrome Other        Review of Systems:     Review of Systems   Constitutional: Negative.  Negative for activity change, appetite change, chills, diaphoresis, fatigue and unexpected weight change.   HENT: Negative for congestion, dental problem, drooling, ear discharge, ear pain, facial swelling, hearing loss, mouth sores, nosebleeds, postnasal drip, rhinorrhea, sinus pressure, sneezing, sore throat, tinnitus, trouble swallowing and voice change.    Eyes: Negative.  Negative for photophobia, pain, discharge, redness, itching and visual disturbance.   Respiratory: Negative.  Negative for apnea, cough, choking, chest tightness, shortness of breath, wheezing and stridor.    Cardiovascular: Negative.  Negative for chest pain, palpitations and leg swelling.   Gastrointestinal: Negative.  Negative for abdominal distention, abdominal pain, anal bleeding, blood in stool, constipation, diarrhea, nausea, rectal pain and vomiting.   Endocrine: Negative.  Negative for cold intolerance, heat intolerance, polydipsia, polyphagia and polyuria.   Genitourinary: Positive for difficulty urinating.   Musculoskeletal: Negative.  Negative for arthralgias, back pain, gait problem, joint swelling, myalgias, neck pain and neck stiffness.   Skin: Negative.  Negative for color change, pallor, rash and wound.   Allergic/Immunologic: Negative.  Negative for environmental allergies, food allergies and immunocompromised state.   Neurological: Negative.  Negative for dizziness, tremors, seizures, syncope, facial asymmetry, speech difficulty, weakness,  light-headedness, numbness and headaches.   Hematological: Negative.  Negative for adenopathy. Does not bruise/bleed easily.   Psychiatric/Behavioral: Negative for agitation, behavioral problems, confusion, decreased concentration, dysphoric mood, hallucinations, self-injury, sleep disturbance and suicidal ideas. The patient is not nervous/anxious and is not hyperactive.    All other systems reviewed and are negative.      Physical Exam:     Physical Exam   Constitutional: She appears well-developed and well-nourished.   HENT:   Head: Normocephalic and atraumatic.   Right Ear: External ear normal.   Left Ear: External ear normal.   Mouth/Throat: Oropharynx is clear and moist.   Eyes: Pupils are equal, round, and reactive to light. Conjunctivae are normal.   Cardiovascular: Normal rate, regular rhythm, normal heart sounds and intact distal pulses.    Pulmonary/Chest: Effort normal and breath sounds normal.   Abdominal: Soft. Bowel sounds are normal. She exhibits no distension and no mass. There is no tenderness. There is no rebound and no guarding.   Genitourinary: Vagina normal. No vaginal discharge found.   Musculoskeletal: Normal range of motion.   Neurological: She is alert. She has normal reflexes.   Skin: Skin is warm and dry.   Psychiatric: She has a normal mood and affect. Her behavior is normal. Judgment and thought content normal.       I have reviewed the following portions of the patient's history: allergies, current medications, past family history, past medical history, past social history, past surgical history, problem list and ROS and confirm it's accurate.      PUrethral dilation-after an appropriate informed consent the patient was brought to the procedure suite.  The urethra was gently anesthetized with 10 cc of 2% viscous Xylocaine jelly.  After an adequate period of topical anesthesia and dilated with Kila sounds from 16-28 Serbian sequentially without complication the patient was given  gentamicin as prophylaxis with 80 mgrocedure:       Assessment/Plan:   Urethral stenosis-status post dilation  Hypoactive sexual desire disorder-requests a prescription for low-dose testosterone and was given Testim 25 mg every other day to use topically in the back of her leg  Narcotic pain medication-patient has significant acute pain that I believe would be an indication for the use of narcotic pain medication.  I discussed the significant risks of pain medication and the fact that this will be a short only option and I will give her no more than a three-day supply of pain medication.  And I will not plan long-term medication and that this will be sent to a pain clinic if at all becomes necessary.  We discussed signing a pain medication agreement and the fact that we're going to run a state [facility] review to be sure the patient is not getting pain medication from elsewhere.  If this is the case we will not give pain medication.  As part of the patient's treatment plan of there being prescribed a controlled substance with potential for abuse.  This patient has been wait aware of the appropriate dose of such medications including, the risk for somnolence, limited ability to drive and/or safety and the significant potential for overdose.  It is been made clear that these medications are for the prescribed patient only without concomitant use of alcohol or other sepsis unless prescribed by the medical provider.  Has completed prescribing agreement detailing the terms of continue prescribing him a controlled substance.  Including monitoring Luis Alberto ports, the possibility of urine drug screens and pedal counts.  The patient is aware that we reviewed LUIS ALBERTO reports on a regular basis and scan them into the chart.  History and physical examination exhibited continued safe and appropriate use of controlled substances.  Also discussed the fact that the new [place] legislation allows only a three-day prescription for pain  medication.  In this situation he will be referred to a chronic pain clinic.     Patient's There is no height or weight on file to calculate BMI. BMI is within normal parameters. No follow-up required.          This document has been electronically signed by VERENICE FINK MD August 7, 2018 1:08 PM

## 2018-08-07 NOTE — DISCHARGE INSTRUCTIONS
Go home and rest.  Do not take any more Maxalt or Imitrex if you have worsening headache, as that will prevent use of DHE if you need to return to the ER within 24 hours.

## 2018-08-07 NOTE — ED PROVIDER NOTES
Subjective   Ms. Susanna Camilo is a 43-year-old female presenting to the emergency department with complaints of a severe headache with onset one week ago, primarily around the temples and forehead (left greater than right). She also complains of vomiting, irritability, and blurred vision, but denies numbness in the extremities. She has a hx of chronic migraines, and they are often associated with stress. Her daughter attempted suicide 8 days ago and the patient has been experiencing extreme stress since that time. She states that the migraines are typically resolved with DHE, and she took Maxalt this morning around 0700. She also takes Botox treatments regularly, most recently 4 months ago. There are no other acute complaints at this time.        History provided by:  Patient  Headache   Pain location:  Frontal  Radiates to:  Does not radiate  Onset quality:  Gradual  Duration:  1 week  Timing:  Constant  Progression:  Unchanged  Chronicity:  Recurrent  Similar to prior headaches: yes    Relieved by:  None tried  Worsened by:  Nothing  Ineffective treatments:  None tried  Associated symptoms: blurred vision, nausea and vomiting        Review of Systems   Eyes: Positive for blurred vision.   Gastrointestinal: Positive for nausea and vomiting.   Neurological: Positive for headaches.   All other systems reviewed and are negative.      Past Medical History:   Diagnosis Date   • Abdominal pain    • Abdominal swelling    • Hoyos's disease    • Bipolar 1 disorder (CMS/HCC)    • Brain tumor (benign) (CMS/HCC)    • Brain tumor (CMS/HCC)     R Frontal Lobe per pt   • Brain tumor (CMS/HCC) 2014   • Constipation    • DDD (degenerative disc disease), cervical 05/29/2017   • DDD (degenerative disc disease), cervical    • Diarrhea    • Fibromyalgia    • IBS (irritable bowel syndrome)    • Migraine    • Nausea & vomiting    • PONV (postoperative nausea and vomiting)    • PTSD (post-traumatic stress disorder)    • Rectal bleeding         Allergies   Allergen Reactions   • Ativan [Lorazepam] Hallucinations     confusion   • Sulfa Antibiotics Shortness Of Breath and Swelling   • Compazine [Prochlorperazine Edisylate] Hives   • Demerol [Meperidine] Hives   • Droperidol Itching   • Metoclopramide Swelling   • Toradol [Ketorolac Tromethamine] Hives and Itching   • Zofran [Ondansetron Hcl] Rash       Past Surgical History:   Procedure Laterality Date   • ANAL SCOPE N/A 7/28/2016    Procedure: ANAL SCOPE;  Surgeon: Kael Lopez MD;  Location: [facility] OR;  Service:    • APPENDECTOMY     • COLONOSCOPY N/A 6/30/2016    Procedure: COLONOSCOPY  CPTCODE:70560;  Surgeon: Jose Antonio Belle III, MD;  Location: [facility] OR;  Service:    • COLONOSCOPY N/A 7/7/2016    Procedure: COLONOSCOPY (02882) CPT;  Surgeon: Jose Antonio Belle III, MD;  Location: [facility] OR;  Service:    • CYSTOSCOPY RETROGRADE PYELOGRAM Bilateral 4/28/2017    Procedure: CYSTOSCOPY RETROGRADE PYELOGRAM;  Surgeon: Mu Blunt MD;  Location: [facility] OR;  Service:    • ENDOSCOPY N/A 6/30/2016    Procedure: ESOPHAGOGASTRODUODENOSCOPY WITH BIOPSY  CPTCODE:50615;  Surgeon: Jose Antonio Belle III, MD;  Location: [facility] OR;  Service:    • HEMORRHOIDECTOMY N/A 7/28/2016    Procedure: HEMORRHOID STAPLING;  Surgeon: Kael Lopez MD;  Location: [facility] OR;  Service:    • HYSTERECTOMY     • KNEE SURGERY     • LAPAROSCOPIC SALPINGOOPHERECTOMY     • PORTACATH PLACEMENT N/A 7/28/2017    Procedure: INSERTION OF PORTACATH;  Surgeon: Celso Arredondo MD;  Location: [facility] OR;  Service:    • SHOULDER SURGERY      3 times       Family History   Problem Relation Age of Onset   • Crohn's disease Other    • Hypertension Other    • Diabetes Other    • Irritable bowel syndrome Other        Social History     Social History   • Marital status:      Social History Main Topics   • Smoking status: Former Smoker     Packs/day: 1.00     Years: 20.00     Quit date: 11/1/2015   • Smokeless tobacco:  Never Used   • Alcohol use No   • Drug use: Yes     Types: Marijuana      Comment: for pain   • Sexual activity: Defer     Other Topics Concern   • Not on file     Social History Narrative    Lives in [place], works as a , independent of ADL         Objective   Physical Exam   Constitutional: She is oriented to person, place, and time. She appears well-developed and well-nourished. No distress.   The patient is very talkative, rapid speech.   HENT:   Head: Normocephalic and atraumatic.   Nose: Nose normal.   Face is symmetrical. Pharynx benign.   Eyes: Pupils are equal, round, and reactive to light. Conjunctivae and EOM are normal. No scleral icterus.   Neck: Normal range of motion. Neck supple.   Cardiovascular: Normal rate, regular rhythm and normal heart sounds.    No murmur heard.  Pulmonary/Chest: Effort normal and breath sounds normal. No respiratory distress.   Abdominal: Soft. There is no tenderness.   Abdomen benign.   Musculoskeletal: Normal range of motion.   Neurological: She is alert and oriented to person, place, and time.    5/5.   Skin: Skin is warm and dry.   Psychiatric:   The patient's behavior is animated.   Nursing note and vitals reviewed.      Procedures         ED Course  ED Course as of Aug 07 0112   Mon Aug 06, 2018   2359 Her headache is reduced by about half.  [CB]      ED Course User Index  [CB] Sony Richardson     No results found for this or any previous visit (from the past 24 hour(s)).  Note: In addition to lab results from this visit, the labs listed above may include labs taken at another facility or during a different encounter within the last 24 hours. Please correlate lab times with ED admission and discharge times for further clarification of the services performed during this visit.    No orders to display     Vitals:    08/06/18 2126   BP: 103/55   BP Location: Left arm   Patient Position: Sitting   Pulse: 70   Resp: 16   Temp: 97.6 °F (36.4 °C)   TempSrc: Oral  "  SpO2: 99%   Weight: 76.2 kg (168 lb)   Height: 175.3 cm (69\")     Medications   sodium chloride 0.9 % flush 10 mL (not administered)   sodium chloride 0.9 % bolus 500 mL (0 mL Intravenous Stopped 8/6/18 2359)   butorphanol (STADOL) injection 2 mg (2 mg Intravenous Given 8/6/18 2302)   promethazine (PHENERGAN) injection 12.5 mg (12.5 mg Intravenous Given 8/6/18 2301)   dexamethasone (DECADRON) IVPB 10 mg (0 mg Intravenous Stopped 8/7/18 0000)   LORazepam (ATIVAN) injection 1 mg (1 mg Intravenous Given 8/7/18 0012)     ECG/EMG Results (last 24 hours)     ** No results found for the last 24 hours. **                        [facility]    Final diagnoses:   Intractable migraine without status migrainosus, unspecified migraine type       Documentation assistance provided by asad Richardson.  Information recorded by the scribtalon was done at my direction and has been verified and validated by me.     Sony Richardson  08/06/18 5400       Sony Richardson  08/06/18 4490       Giuseppe Sainz MD  08/07/18 0112    "

## 2018-08-14 ENCOUNTER — HOSPITAL ENCOUNTER (EMERGENCY)
Facility: HOSPITAL | Age: 43
Discharge: HOME | End: 2018-08-14
Attending: EMERGENCY MEDICINE | Admitting: EMERGENCY MEDICINE

## 2018-08-14 VITALS
HEIGHT: 69 IN | OXYGEN SATURATION: 98 % | DIASTOLIC BLOOD PRESSURE: 67 MMHG | BODY MASS INDEX: 26.36 KG/M2 | TEMPERATURE: 98.3 F | HEART RATE: 59 BPM | RESPIRATION RATE: 18 BRPM | WEIGHT: 178 LBS | SYSTOLIC BLOOD PRESSURE: 112 MMHG

## 2018-08-14 DIAGNOSIS — R07.9 CHEST PAIN, UNSPECIFIED TYPE: Primary | ICD-10-CM

## 2018-08-14 DIAGNOSIS — IMO0002 CHRONIC MIGRAINE: ICD-10-CM

## 2018-08-14 LAB
ANION GAP SERPL CALCULATED.3IONS-SCNC: 3.8 MMOL/L (ref 3.6–11.2)
BASOPHILS # BLD AUTO: 0.01 10*3/MM3 (ref 0–0.3)
BASOPHILS NFR BLD AUTO: 0.2 % (ref 0–2)
BUN BLD-MCNC: 8 MG/DL (ref 7–21)
BUN/CREAT SERPL: 12.7 (ref 7–25)
CALCIUM SPEC-SCNC: 9.1 MG/DL (ref 7.7–10)
CHLORIDE SERPL-SCNC: 106 MMOL/L (ref 99–112)
CO2 SERPL-SCNC: 28.2 MMOL/L (ref 24.3–31.9)
CREAT BLD-MCNC: 0.63 MG/DL (ref 0.43–1.29)
DEPRECATED RDW RBC AUTO: 45.2 FL (ref 37–54)
EOSINOPHIL # BLD AUTO: 0.18 10*3/MM3 (ref 0–0.7)
EOSINOPHIL NFR BLD AUTO: 3.4 % (ref 0–5)
ERYTHROCYTE [DISTWIDTH] IN BLOOD BY AUTOMATED COUNT: 14.9 % (ref 11.5–14.5)
GFR SERPL CREATININE-BSD FRML MDRD: 103 ML/MIN/1.73
GLUCOSE BLD-MCNC: 115 MG/DL (ref 70–110)
HCT VFR BLD AUTO: 37.3 % (ref 37–47)
HGB BLD-MCNC: 11.9 G/DL (ref 12–16)
IMM GRANULOCYTES # BLD: 0.01 10*3/MM3 (ref 0–0.03)
IMM GRANULOCYTES NFR BLD: 0.2 % (ref 0–0.5)
LIPASE SERPL-CCNC: 35 U/L (ref 13–60)
LYMPHOCYTES # BLD AUTO: 1.44 10*3/MM3 (ref 1–3)
LYMPHOCYTES NFR BLD AUTO: 27.3 % (ref 21–51)
MCH RBC QN AUTO: 26.9 PG (ref 27–33)
MCHC RBC AUTO-ENTMCNC: 31.9 G/DL (ref 33–37)
MCV RBC AUTO: 84.4 FL (ref 80–94)
MONOCYTES # BLD AUTO: 0.32 10*3/MM3 (ref 0.1–0.9)
MONOCYTES NFR BLD AUTO: 6.1 % (ref 0–10)
NEUTROPHILS # BLD AUTO: 3.32 10*3/MM3 (ref 1.4–6.5)
NEUTROPHILS NFR BLD AUTO: 62.8 % (ref 30–70)
OSMOLALITY SERPL CALC.SUM OF ELEC: 274.9 MOSM/KG (ref 273–305)
PLATELET # BLD AUTO: 149 10*3/MM3 (ref 130–400)
PMV BLD AUTO: 11 FL (ref 6–10)
POTASSIUM BLD-SCNC: 4.2 MMOL/L (ref 3.5–5.3)
RBC # BLD AUTO: 4.42 10*6/MM3 (ref 4.2–5.4)
SODIUM BLD-SCNC: 138 MMOL/L (ref 135–153)
TROPONIN I SERPL-MCNC: <0.006 NG/ML
WBC NRBC COR # BLD: 5.28 10*3/MM3 (ref 4.5–12.5)

## 2018-08-14 PROCEDURE — 83690 ASSAY OF LIPASE: CPT | Performed by: EMERGENCY MEDICINE

## 2018-08-14 PROCEDURE — 96361 HYDRATE IV INFUSION ADD-ON: CPT

## 2018-08-14 PROCEDURE — 25010000002 DIHYDROERGOTAMINE MESYLATE PER 1 MG: Performed by: EMERGENCY MEDICINE

## 2018-08-14 PROCEDURE — 96375 TX/PRO/DX INJ NEW DRUG ADDON: CPT

## 2018-08-14 PROCEDURE — 99283 EMERGENCY DEPT VISIT LOW MDM: CPT

## 2018-08-14 PROCEDURE — 96374 THER/PROPH/DIAG INJ IV PUSH: CPT

## 2018-08-14 PROCEDURE — 84484 ASSAY OF TROPONIN QUANT: CPT | Performed by: EMERGENCY MEDICINE

## 2018-08-14 PROCEDURE — 80048 BASIC METABOLIC PNL TOTAL CA: CPT | Performed by: EMERGENCY MEDICINE

## 2018-08-14 PROCEDURE — 25010000002 PROMETHAZINE PER 50 MG: Performed by: EMERGENCY MEDICINE

## 2018-08-14 PROCEDURE — 93005 ELECTROCARDIOGRAM TRACING: CPT | Performed by: EMERGENCY MEDICINE

## 2018-08-14 PROCEDURE — 85025 COMPLETE CBC W/AUTO DIFF WBC: CPT | Performed by: EMERGENCY MEDICINE

## 2018-08-14 RX ORDER — DIHYDROERGOTAMINE MESYLATE 1 MG/ML
1 INJECTION, SOLUTION INTRAMUSCULAR; INTRAVENOUS; SUBCUTANEOUS ONCE
Status: COMPLETED | OUTPATIENT
Start: 2018-08-14 | End: 2018-08-14

## 2018-08-14 RX ORDER — PROMETHAZINE HYDROCHLORIDE 25 MG/ML
12.5 INJECTION, SOLUTION INTRAMUSCULAR; INTRAVENOUS ONCE
Status: COMPLETED | OUTPATIENT
Start: 2018-08-14 | End: 2018-08-14

## 2018-08-14 RX ORDER — SODIUM CHLORIDE 0.9 % (FLUSH) 0.9 %
10 SYRINGE (ML) INJECTION AS NEEDED
Status: DISCONTINUED | OUTPATIENT
Start: 2018-08-14 | End: 2018-08-14 | Stop reason: HOSPADM

## 2018-08-14 RX ADMIN — SODIUM CHLORIDE 1000 ML: 9 INJECTION, SOLUTION INTRAVENOUS at 16:51

## 2018-08-14 RX ADMIN — DIHYDROERGOTAMINE MESYLATE 1 MG: 1 INJECTION, SOLUTION INTRAMUSCULAR; INTRAVENOUS; SUBCUTANEOUS at 16:49

## 2018-08-14 RX ADMIN — PROMETHAZINE HYDROCHLORIDE 12.5 MG: 25 INJECTION, SOLUTION INTRAMUSCULAR; INTRAVENOUS at 16:46

## 2018-08-14 NOTE — ED NOTES
RENATO EDEN Calhoun SUPERVISOR IN ROOM TALKING PT AND FAMILY THEY ARE NOT HAPPY WITH THE TREATMENT THAT THEY ARE GETTING FROM DR. HYDE AND THEY WANTED TO TALK WITH SOMEONE OVER HIM      Adam Devlin RN  08/14/18 5946

## 2018-08-14 NOTE — ED NOTES
Patient requests not to be seen in ED 2, states that she prefers to go to the main ED where they will do something for her     Tamayo, Jocelyne Arciniega, DENTON  08/14/18 7933

## 2018-08-14 NOTE — ED PROVIDER NOTES
Subjective   History of Present Illness  TRIAGE CHIEF COMPLAINT:   Chief Complaint   Patient presents with   • Chest Pain   • Headache         HPI: Susanna Camilo   is a 43 y.o. female   who presents to the emergency department complaining of chest pain and headache.  Patient with a history of migraines, IgA nephropathy, IBS, bipolar disorder, fibromyalgia, PTSD.  She had a negative stress test 2 months ago.  This is ER visit #46 in the past 12 months.  Patient reports left-sided chest discomfort located beneath her left breast ongoing for the past 2 hours.  She took sublingual nitroglycerin but then subsequently developed onset of generalized headache.  No report of exacerbating or relieving factors.  Patient states the pain may be related to her pleurisy which she has been diagnosed with several times before.  Patient has had 2 negative CT PE protocol scans within the past year. Patient has more fixated on her migraine headache than on her chest discomfort.  She requests a cocktail of DHE, phenergan, decadron, and Stadol.   is very adamant that she receive Stadol in particular.           Review of Systems   All other systems reviewed and are negative.      Past Medical History:   Diagnosis Date   • Abdominal pain    • Abdominal swelling    • Hoyos's disease    • Bipolar 1 disorder (CMS/HCC)    • Brain tumor (benign) (CMS/HCC)    • Brain tumor (CMS/HCC)     R Frontal Lobe per pt   • Brain tumor (CMS/HCC) 2014   • Constipation    • DDD (degenerative disc disease), cervical 05/29/2017   • DDD (degenerative disc disease), cervical    • Diarrhea    • Fibromyalgia    • IBS (irritable bowel syndrome)    • Migraine    • Nausea & vomiting    • PONV (postoperative nausea and vomiting)    • PTSD (post-traumatic stress disorder)    • Rectal bleeding        Allergies   Allergen Reactions   • Ativan [Lorazepam] Hallucinations     confusion   • Sulfa Antibiotics Shortness Of Breath and Swelling   • Compazine  [Prochlorperazine Edisylate] Hives   • Demerol [Meperidine] Hives   • Droperidol Itching   • Metoclopramide Swelling   • Toradol [Ketorolac Tromethamine] Hives and Itching   • Zofran [Ondansetron Hcl] Rash       Past Surgical History:   Procedure Laterality Date   • ANAL SCOPE N/A 7/28/2016    Procedure: ANAL SCOPE;  Surgeon: Kael Lopez MD;  Location: [facility] OR;  Service:    • APPENDECTOMY     • COLONOSCOPY N/A 6/30/2016    Procedure: COLONOSCOPY  CPTCODE:92072;  Surgeon: Jose Antonio Belle III, MD;  Location: [facility] OR;  Service:    • COLONOSCOPY N/A 7/7/2016    Procedure: COLONOSCOPY (28361) CPT;  Surgeon: Jose Antonio Belle III, MD;  Location: [facility] OR;  Service:    • CYSTOSCOPY RETROGRADE PYELOGRAM Bilateral 4/28/2017    Procedure: CYSTOSCOPY RETROGRADE PYELOGRAM;  Surgeon: Mu Blunt MD;  Location: [facility] OR;  Service:    • ENDOSCOPY N/A 6/30/2016    Procedure: ESOPHAGOGASTRODUODENOSCOPY WITH BIOPSY  CPTCODE:21335;  Surgeon: Jose Antonio Belle III, MD;  Location: [facility] OR;  Service:    • HEMORRHOIDECTOMY N/A 7/28/2016    Procedure: HEMORRHOID STAPLING;  Surgeon: Kael Lopez MD;  Location: [facility] OR;  Service:    • HYSTERECTOMY     • KNEE SURGERY     • LAPAROSCOPIC SALPINGOOPHERECTOMY     • PORTACATH PLACEMENT N/A 7/28/2017    Procedure: INSERTION OF PORTACATH;  Surgeon: Celso Arredondo MD;  Location: [facility] OR;  Service:    • SHOULDER SURGERY      3 times       Family History   Problem Relation Age of Onset   • Crohn's disease Other    • Hypertension Other    • Diabetes Other    • Irritable bowel syndrome Other        Social History     Social History   • Marital status:      Social History Main Topics   • Smoking status: Former Smoker     Packs/day: 1.00     Years: 20.00     Quit date: 11/1/2015   • Smokeless tobacco: Never Used   • Alcohol use No   • Drug use: Yes     Types: Marijuana      Comment: for pain   • Sexual activity: Defer     Other Topics Concern   • Not on  file     Social History Narrative    Lives in [place], works as a , independent of ADL           Objective   Physical Exam        CONSTITUTIONAL: Awake, oriented, appears older than stated age, anxious but non-toxic   HENT: Atraumatic, normocephalic, oral mucosa pink and moist, airway patent. Nares patent without drainage. External ears normal.   EYES: Conjunctiva clear, EOMI, PERRL   NECK: Trachea midline, non-tender, supple   CARDIOVASCULAR: Normal heart rate, Normal rhythm, No murmurs, rubs, gallops   PULMONARY/CHEST: Clear to auscultation, no rhonchi, wheezes, or rales. Symmetrical breath sounds.  Mild left anterior chest wall tenderness   ABDOMINAL: Non-distended, soft, non-tender - no rebound or guarding. BS normal.   NEUROLOGIC: Non-focal, moving all four extremities, no gross sensory or motor deficits.   EXTREMITIES: No clubbing, cyanosis, or edema   SKIN: Warm, Dry, No erythema, No rash     No orders to display           EKG:  NSR, rate 82, no acute ST changes, T-wave inversions, or ectopy          Procedures           ED Course        ED COURSE / MEDICAL DECISION MAKING:   Nursing notes reviewed.    Patient and her  are very argumentative and exhibited drug-seeking behavior insisting upon Stadol despite my explanation regarding appropriate management of chronic pain in the emergency department as it relates to controlled substances and narcotic usage.  Explained that patient has had 46 visits to this emergency department alone within the past 12 months.  They insist that I attempt to contact Dr. Key of [facility] neurology.  He was unavailable for consultation today however I spoke to his colleague Dr. Crystal who stated unequivocally that the patient should not be given Stadol for her headache but that she would be happy to see her in the neurology clinic for close follow-up.  Patient's cardiac workup has been unremarkable and reviewed per chart demonstrates a negative stress echo  on 6/5/18.  Plan for close outpatient follow-up versus return with any concerns.    House supervisor came down and spoke to patient and provided her and her  with a copy of our narcotic and chronic pain policy. I also spoke to them in the presence of the ER charge nurse.  continues to be manipulative and argumentative. He will be provided with a complaint form.     DECISION TO DISCHARGE/ADMIT: see ED care timeline       Electronically signed by: Guru Gonzales MD, 8/14/2018 5:48 PM                [facility]  Final diagnoses:   Chest pain, unspecified type   Chronic migraine            Guru Gonzales MD  08/14/18 1705

## 2018-08-16 ENCOUNTER — HOSPITAL ENCOUNTER (EMERGENCY)
Facility: HOSPITAL | Age: 43
Discharge: HOME | End: 2018-08-16
Attending: EMERGENCY MEDICINE | Admitting: EMERGENCY MEDICINE

## 2018-08-16 VITALS
BODY MASS INDEX: 24.88 KG/M2 | OXYGEN SATURATION: 98 % | HEART RATE: 52 BPM | SYSTOLIC BLOOD PRESSURE: 111 MMHG | HEIGHT: 69 IN | WEIGHT: 168 LBS | TEMPERATURE: 97.9 F | RESPIRATION RATE: 18 BRPM | DIASTOLIC BLOOD PRESSURE: 67 MMHG

## 2018-08-16 DIAGNOSIS — G43.901 MIGRAINE WITH STATUS MIGRAINOSUS, NOT INTRACTABLE, UNSPECIFIED MIGRAINE TYPE: Primary | ICD-10-CM

## 2018-08-16 PROCEDURE — 96365 THER/PROPH/DIAG IV INF INIT: CPT

## 2018-08-16 PROCEDURE — 99283 EMERGENCY DEPT VISIT LOW MDM: CPT

## 2018-08-16 PROCEDURE — 25010000002 HEPARIN FLUSH (PORCINE) 100 UNIT/ML SOLUTION: Performed by: PHYSICIAN ASSISTANT

## 2018-08-16 PROCEDURE — 25010000002 DEXAMETHASONE PER 1 MG: Performed by: PHYSICIAN ASSISTANT

## 2018-08-16 PROCEDURE — 25010000002 PROMETHAZINE PER 50 MG: Performed by: PHYSICIAN ASSISTANT

## 2018-08-16 PROCEDURE — 25010000002 BUTORPHANOL PER 1 MG: Performed by: PHYSICIAN ASSISTANT

## 2018-08-16 PROCEDURE — 25010000002 DIHYDROERGOTAMINE MESYLATE PER 1 MG: Performed by: PHYSICIAN ASSISTANT

## 2018-08-16 PROCEDURE — 96375 TX/PRO/DX INJ NEW DRUG ADDON: CPT

## 2018-08-16 RX ORDER — SODIUM CHLORIDE 0.9 % (FLUSH) 0.9 %
10 SYRINGE (ML) INJECTION AS NEEDED
Status: DISCONTINUED | OUTPATIENT
Start: 2018-08-16 | End: 2018-08-16 | Stop reason: HOSPADM

## 2018-08-16 RX ORDER — DIHYDROERGOTAMINE MESYLATE 1 MG/ML
1 INJECTION, SOLUTION INTRAMUSCULAR; INTRAVENOUS; SUBCUTANEOUS ONCE
Status: COMPLETED | OUTPATIENT
Start: 2018-08-16 | End: 2018-08-16

## 2018-08-16 RX ORDER — PROMETHAZINE HYDROCHLORIDE 25 MG/ML
25 INJECTION, SOLUTION INTRAMUSCULAR; INTRAVENOUS ONCE
Status: COMPLETED | OUTPATIENT
Start: 2018-08-16 | End: 2018-08-16

## 2018-08-16 RX ADMIN — BUTORPHANOL TARTRATE 2 MG: 2 INJECTION, SOLUTION INTRAMUSCULAR; INTRAVENOUS at 12:17

## 2018-08-16 RX ADMIN — SODIUM CHLORIDE 1000 ML: 9 INJECTION, SOLUTION INTRAVENOUS at 11:51

## 2018-08-16 RX ADMIN — HEPARIN SODIUM (PORCINE) LOCK FLUSH IV SOLN 100 UNIT/ML 300 UNITS: 100 SOLUTION at 13:11

## 2018-08-16 RX ADMIN — DEXAMETHASONE SODIUM PHOSPHATE 10 MG: 4 INJECTION, SOLUTION INTRAMUSCULAR; INTRAVENOUS at 12:20

## 2018-08-16 RX ADMIN — DIHYDROERGOTAMINE MESYLATE 1 MG: 1 INJECTION, SOLUTION INTRAMUSCULAR; INTRAVENOUS; SUBCUTANEOUS at 12:15

## 2018-08-16 RX ADMIN — PROMETHAZINE HYDROCHLORIDE 25 MG: 25 INJECTION, SOLUTION INTRAMUSCULAR; INTRAVENOUS at 11:58

## 2018-08-16 NOTE — ED PROVIDER NOTES
Subjective   43-year-old white female presents secondary to throbbing headache.  She states this is similar to her previous migraines.  She states this one for several days.  She states that she's not been able to use her DHE.  She is followed by neurology Dr. Key.  No fever.  She states this is very similar to her previous migraines though this will not go away.  No neurologic change.  No other complaints.            Review of Systems   Constitutional: Negative.  Negative for fever.   Respiratory: Negative.    Cardiovascular: Negative.  Negative for chest pain.   Gastrointestinal: Negative.  Negative for abdominal pain.   Endocrine: Negative.    Genitourinary: Negative.  Negative for dysuria.   Skin: Negative.    Neurological: Positive for headaches.   Psychiatric/Behavioral: Negative.    All other systems reviewed and are negative.      Past Medical History:   Diagnosis Date   • Abdominal pain    • Abdominal swelling    • Hoyos's disease    • Bipolar 1 disorder (CMS/HCC)    • Brain tumor (benign) (CMS/HCC)    • Brain tumor (CMS/HCC)     R Frontal Lobe per pt   • Brain tumor (CMS/HCC) 2014   • Constipation    • DDD (degenerative disc disease), cervical 05/29/2017   • DDD (degenerative disc disease), cervical    • Diarrhea    • Fibromyalgia    • IBS (irritable bowel syndrome)    • Migraine    • Nausea & vomiting    • PONV (postoperative nausea and vomiting)    • PTSD (post-traumatic stress disorder)    • Rectal bleeding        Allergies   Allergen Reactions   • Ativan [Lorazepam] Hallucinations     confusion   • Sulfa Antibiotics Shortness Of Breath and Swelling   • Compazine [Prochlorperazine Edisylate] Hives   • Demerol [Meperidine] Hives   • Droperidol Itching   • Metoclopramide Swelling   • Toradol [Ketorolac Tromethamine] Hives and Itching   • Zofran [Ondansetron Hcl] Rash       Past Surgical History:   Procedure Laterality Date   • ANAL SCOPE N/A 7/28/2016    Procedure: ANAL SCOPE;  Surgeon: Kael RAMOS  MD Jessica;  Location: [facility] OR;  Service:    • APPENDECTOMY     • COLONOSCOPY N/A 6/30/2016    Procedure: COLONOSCOPY  CPTCODE:54078;  Surgeon: Jose Antonio Belle III, MD;  Location: [facility] OR;  Service:    • COLONOSCOPY N/A 7/7/2016    Procedure: COLONOSCOPY (56358) CPT;  Surgeon: Jose Antonio Belle III, MD;  Location: [facility] OR;  Service:    • CYSTOSCOPY RETROGRADE PYELOGRAM Bilateral 4/28/2017    Procedure: CYSTOSCOPY RETROGRADE PYELOGRAM;  Surgeon: Mu Blunt MD;  Location: [facility] OR;  Service:    • ENDOSCOPY N/A 6/30/2016    Procedure: ESOPHAGOGASTRODUODENOSCOPY WITH BIOPSY  CPTCODE:07940;  Surgeon: Jose Antonio Belle III, MD;  Location: [facility] OR;  Service:    • HEMORRHOIDECTOMY N/A 7/28/2016    Procedure: HEMORRHOID STAPLING;  Surgeon: Kael Lopez MD;  Location: [facility] OR;  Service:    • HYSTERECTOMY     • KNEE SURGERY     • LAPAROSCOPIC SALPINGOOPHERECTOMY     • PORTACATH PLACEMENT N/A 7/28/2017    Procedure: INSERTION OF PORTACATH;  Surgeon: Celso Arredondo MD;  Location: [facility] OR;  Service:    • SHOULDER SURGERY      3 times       Family History   Problem Relation Age of Onset   • Crohn's disease Other    • Hypertension Other    • Diabetes Other    • Irritable bowel syndrome Other        Social History     Social History   • Marital status:      Social History Main Topics   • Smoking status: Former Smoker     Packs/day: 1.00     Years: 20.00     Quit date: 11/1/2015   • Smokeless tobacco: Never Used   • Alcohol use No   • Drug use: Yes     Types: Marijuana      Comment: for pain   • Sexual activity: Defer     Other Topics Concern   • Not on file     Social History Narrative    Lives in [place], works as a , independent of ADL           Objective   Physical Exam   Constitutional: She is oriented to person, place, and time. She appears well-developed and well-nourished. No distress.   HENT:   Head: Normocephalic and atraumatic.   Right Ear: External ear normal.   Left  Ear: External ear normal.   Nose: Nose normal.   Eyes: Pupils are equal, round, and reactive to light. Conjunctivae and EOM are normal.   Neck: Normal range of motion. Neck supple. No JVD present. No tracheal deviation present.   Cardiovascular: Normal rate, regular rhythm and normal heart sounds.    No murmur heard.  Pulmonary/Chest: Effort normal and breath sounds normal. No respiratory distress. She has no wheezes.   Abdominal: Soft. Bowel sounds are normal. There is no tenderness.   Musculoskeletal: Normal range of motion. She exhibits no edema or deformity.   Neurological: She is alert and oriented to person, place, and time. No cranial nerve deficit.   Skin: Skin is warm and dry. No rash noted. She is not diaphoretic. No erythema. No pallor.   Psychiatric: She has a normal mood and affect. Her behavior is normal. Thought content normal.   Nursing note and vitals reviewed.      Procedures           ED Course                  MDM  Number of Diagnoses or Management Options  Migraine with status migrainosus, not intractable, unspecified migraine type: established and worsening     Amount and/or Complexity of Data Reviewed  Discuss the patient with other providers: yes    Risk of Complications, Morbidity, and/or Mortality  Presenting problems: moderate          Final diagnoses:   Migraine with status migrainosus, not intractable, unspecified migraine type            Girish Morales PA  08/16/18 1411       Girish Morales PA  08/16/18 1604

## 2018-09-03 ENCOUNTER — HOSPITAL ENCOUNTER (EMERGENCY)
Facility: HOSPITAL | Age: 43
Discharge: HOME | End: 2018-09-03
Attending: EMERGENCY MEDICINE | Admitting: EMERGENCY MEDICINE

## 2018-09-03 VITALS
DIASTOLIC BLOOD PRESSURE: 87 MMHG | WEIGHT: 168 LBS | BODY MASS INDEX: 26.37 KG/M2 | HEIGHT: 67 IN | OXYGEN SATURATION: 98 % | HEART RATE: 66 BPM | RESPIRATION RATE: 18 BRPM | SYSTOLIC BLOOD PRESSURE: 147 MMHG | TEMPERATURE: 97.7 F
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
## 2018-09-03 DIAGNOSIS — S46.912A SHOULDER STRAIN, LEFT, INITIAL ENCOUNTER: ICD-10-CM

## 2018-09-03 PROCEDURE — 99283 EMERGENCY DEPT VISIT LOW MDM: CPT

## 2018-09-03 RX ORDER — METHYLPREDNISOLONE SODIUM SUCCINATE 125 MG/2ML
125 INJECTION, POWDER, LYOPHILIZED, FOR SOLUTION INTRAMUSCULAR; INTRAVENOUS ONCE
Status: DISCONTINUED | OUTPATIENT
Start: 2018-09-03 | End: 2018-09-03

## 2018-09-03 RX ORDER — ORPHENADRINE CITRATE 30 MG/ML
60 INJECTION INTRAMUSCULAR; INTRAVENOUS ONCE
Status: DISCONTINUED | OUTPATIENT
Start: 2018-09-03 | End: 2018-09-03

## 2018-09-03 NOTE — ED PROVIDER NOTES
Subjective   This is a 43-year-old female comes in with chief complaint fall ×2 days ago while at work.  Patient states she slipped and fell while working at [employer].  Patient remained stable hurting her left shoulder and thoracic back.  Patient denies any head injury or loss consciousness.  Denies any other injuries at this time.  The states she's had aching, throbbing pain.        History provided by:  Patient   used: No    Fall   Mechanism of injury: fall    Injury location:  Shoulder/arm  Shoulder/arm injury location:  L shoulder  Time since incident:  1 day  Arrived directly from scene: no    Fall:     Fall occurred:  Standing    Entrapped after fall: no    Suspicion of alcohol use: no    Suspicion of drug use: no    Tetanus status:  Unknown  Prior to arrival data:     Bystander interventions:  None    Blood loss:  None    Orientation at scene:  Person, place, situation and time    Loss of consciousness: no      Amnesic to event: no      Airway interventions:  None    Breathing interventions:  None    IV access status:  None    IO access:  None    Fluids administered:  None    Cardiac interventions:  None    Medications administered:  None  Associated symptoms: no abdominal pain, no back pain, no blindness, no chest pain, no difficulty breathing, no headaches, no nausea, no neck pain, no seizures and no vomiting    Risk factors: no AICD, no anticoagulation therapy and no asthma        Review of Systems   Eyes: Negative for blindness.   Cardiovascular: Negative for chest pain.   Gastrointestinal: Negative for abdominal pain, nausea and vomiting.   Musculoskeletal: Positive for arthralgias, joint swelling and myalgias. Negative for back pain and neck pain.   Neurological: Negative for seizures and headaches.   All other systems reviewed and are negative.      Past Medical History:   Diagnosis Date   • Abdominal pain    • Abdominal swelling    • Hoyos's disease    • Bipolar 1 disorder  (CMS/HCC)    • Brain tumor (benign) (CMS/HCC)    • Brain tumor (CMS/HCC)     R Frontal Lobe per pt   • Brain tumor (CMS/HCC) 2014   • Constipation    • DDD (degenerative disc disease), cervical 05/29/2017   • DDD (degenerative disc disease), cervical    • Diarrhea    • Fibromyalgia    • IBS (irritable bowel syndrome)    • Migraine    • Nausea & vomiting    • PONV (postoperative nausea and vomiting)    • PTSD (post-traumatic stress disorder)    • Rectal bleeding        Allergies   Allergen Reactions   • Ativan [Lorazepam] Hallucinations     confusion   • Sulfa Antibiotics Shortness Of Breath and Swelling   • Compazine [Prochlorperazine Edisylate] Hives   • Demerol [Meperidine] Hives   • Droperidol Itching   • Metoclopramide Swelling   • Toradol [Ketorolac Tromethamine] Hives and Itching   • Zofran [Ondansetron Hcl] Rash       Past Surgical History:   Procedure Laterality Date   • ANAL SCOPE N/A 7/28/2016    Procedure: ANAL SCOPE;  Surgeon: Kael Lopez MD;  Location: [facility] OR;  Service:    • APPENDECTOMY     • COLONOSCOPY N/A 6/30/2016    Procedure: COLONOSCOPY  CPTCODE:49658;  Surgeon: Jose Antonio Belle III, MD;  Location: [facility] OR;  Service:    • COLONOSCOPY N/A 7/7/2016    Procedure: COLONOSCOPY (58733) CPT;  Surgeon: Jose Antonio Belle III, MD;  Location: [facility] OR;  Service:    • CYSTOSCOPY RETROGRADE PYELOGRAM Bilateral 4/28/2017    Procedure: CYSTOSCOPY RETROGRADE PYELOGRAM;  Surgeon: Mu Blunt MD;  Location: [facility] OR;  Service:    • ENDOSCOPY N/A 6/30/2016    Procedure: ESOPHAGOGASTRODUODENOSCOPY WITH BIOPSY  CPTCODE:15763;  Surgeon: Jose Antonio Belle III, MD;  Location: [facility] OR;  Service:    • HEMORRHOIDECTOMY N/A 7/28/2016    Procedure: HEMORRHOID STAPLING;  Surgeon: Kael Lopez MD;  Location: [facility] OR;  Service:    • HYSTERECTOMY     • KNEE SURGERY     • LAPAROSCOPIC SALPINGOOPHERECTOMY     • PORTACATH PLACEMENT N/A 7/28/2017    Procedure: INSERTION OF PORTACATH;  Surgeon:  Celso Arredondo MD;  Location: [facility];  Service:    • SHOULDER SURGERY      3 times       Family History   Problem Relation Age of Onset   • Crohn's disease Other    • Hypertension Other    • Diabetes Other    • Irritable bowel syndrome Other        Social History     Social History   • Marital status:      Social History Main Topics   • Smoking status: Former Smoker     Packs/day: 1.00     Years: 20.00     Quit date: 11/1/2015   • Smokeless tobacco: Never Used   • Alcohol use No   • Drug use: Yes     Types: Marijuana      Comment: for pain   • Sexual activity: Defer     Other Topics Concern   • Not on file     Social History Narrative    Lives in [place], works as a , independent of ADL           Objective   Physical Exam   Constitutional: She is oriented to person, place, and time. She appears well-developed and well-nourished.   HENT:   Head: Normocephalic.   Right Ear: External ear normal.   Left Ear: External ear normal.   Nose: Nose normal.   Mouth/Throat: Oropharynx is clear and moist.   Eyes: Pupils are equal, round, and reactive to light. Conjunctivae and EOM are normal. Right eye exhibits no discharge. Left eye exhibits no discharge. No scleral icterus.   Neck: Normal range of motion. Neck supple. No JVD present. No tracheal deviation present. No thyromegaly present.   Cardiovascular: Normal rate, regular rhythm, normal heart sounds and intact distal pulses.  Exam reveals no friction rub.    No murmur heard.  Pulmonary/Chest: Effort normal and breath sounds normal. No respiratory distress. She has no wheezes. She exhibits no tenderness.   Abdominal: Soft. Bowel sounds are normal. She exhibits no distension and no mass. There is no tenderness. There is no rebound and no guarding.   Musculoskeletal: She exhibits tenderness.        Left shoulder: She exhibits decreased range of motion, tenderness, pain and spasm.        Thoracic back: She exhibits decreased range of motion, tenderness,  pain and spasm.   Neurological: She is alert and oriented to person, place, and time. She displays normal reflexes. No cranial nerve deficit. She exhibits normal muscle tone. Coordination normal.   Skin: Skin is warm. Capillary refill takes less than 2 seconds. No rash noted. No erythema. No pallor.   Psychiatric: She has a normal mood and affect. Her behavior is normal. Thought content normal.   Nursing note and vitals reviewed.      Procedures           ED Course  ED Course as of Sep 03 1156   Mon Sep 03, 2018   1152 Discussed care with patient. Decided to go elsewhere bc I would not give narcotic pain medicine at this facility.   []      ED Course User Index  [] Mark Stubbs PA-C                  [facility]      Final diagnoses:   Fall, initial encounter   Shoulder strain, left, initial encounter            Mark Stubbs PA-C  09/03/18 1156

## 2018-09-18 ENCOUNTER — OFFICE VISIT (OUTPATIENT)
Dept: UROLOGY | Facility: CLINIC | Age: 43
End: 2018-09-18

## 2018-09-18 VITALS — BODY MASS INDEX: 26.37 KG/M2 | WEIGHT: 168 LBS | HEIGHT: 67 IN

## 2018-09-18 DIAGNOSIS — R31.9 URINARY TRACT INFECTION WITH HEMATURIA, SITE UNSPECIFIED: Primary | ICD-10-CM

## 2018-09-18 DIAGNOSIS — IMO0002 URETHRAL STENOSIS: ICD-10-CM

## 2018-09-18 DIAGNOSIS — N39.0 URINARY TRACT INFECTION WITH HEMATURIA, SITE UNSPECIFIED: Primary | ICD-10-CM

## 2018-09-18 DIAGNOSIS — F52.0 HYPOACTIVE SEXUAL DESIRE DISORDER: ICD-10-CM

## 2018-09-18 PROCEDURE — 99213 OFFICE O/P EST LOW 20 MIN: CPT | Performed by: UROLOGY

## 2018-09-18 PROCEDURE — 53661 DILATION OF URETHRA: CPT | Performed by: UROLOGY

## 2018-09-18 PROCEDURE — 96372 THER/PROPH/DIAG INJ SC/IM: CPT | Performed by: UROLOGY

## 2018-09-18 RX ORDER — OXYCODONE AND ACETAMINOPHEN 10; 325 MG/1; MG/1
1 TABLET ORAL EVERY 6 HOURS PRN
Qty: 8 TABLET | Refills: 0 | Status: SHIPPED | OUTPATIENT
Start: 2018-09-18 | End: 2018-11-09 | Stop reason: SDUPTHER

## 2018-09-18 RX ORDER — GENTAMICIN SULFATE 40 MG/ML
80 INJECTION, SOLUTION INTRAMUSCULAR; INTRAVENOUS ONCE
Status: COMPLETED | OUTPATIENT
Start: 2018-09-18 | End: 2018-09-18

## 2018-09-18 RX ADMIN — GENTAMICIN SULFATE 80 MG: 40 INJECTION, SOLUTION INTRAMUSCULAR; INTRAVENOUS at 13:11

## 2018-09-18 NOTE — PROGRESS NOTES
Chief Complaint:          Chief Complaint   Patient presents with   • Urethral Dilation       HPI:   43 y.o. female.  43 y.o. female.  43 year old white female. Accompanied by  with IgA nephropathy, hepatitis C, severe urethral stenosis who presents for urethral dilation.  She is desirous of more pain medication but I explained to her carefully as long as she is on hydrocodone and I will not give her any more pain medication.  And if she's not on it she can only have several pills around the time of her dilation.  I also explained to her there is no permanent fix of the situation other than self dilatation which she refuses to do.Today she wants to learn to do intermittent catheter.  She gets pain medication from her primary care provider and she will be given pain medication only for short supply after painful dilatation this is been discussed with her primary care physician he will have dilatation no more than every 6 weeks in this office.  She returns today for follow-up.  She is now doing dramatically better.  She is now catheterizing herself intermittently.  It's helped dramatically I went ahead after prep and drape in a sterile fashion and dilated her sequentially to 24 Jordanian which was the max she can take.  There were no complications.  Overall, she's doing well, she is not gotten into see the nephrologist as yet for inexplicable reasons.  I reaffirmed the importance of a workup of her IgA nephropathy.  She is here for follow-up she wants to be dilated.  I went ahead and recommended this.  She was dilated without complication to 26 Jordanian.  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  He also complains of significant loss of libido and requests a  testosterone injection.  And a long discussion of the complications including clitorimegaly, hair growth, nipple hair growth etc. and she wishes to proceed.  She got a nice response from a testosterone and wants it repeated at a low dose she's here for dilation which was accomplished without complication  She returns today for dilation.  Additionally she felt that testosterone worked well but unfortunately we don't do it in the office anymore and she's going to see her primary care physician for a prescription for Premarin which she was on previously and worked dramatically well.  She's here for dilation and is done extremely well I went ahead and dilated to 28 French.  She does well with the topical testosterone I gave her prescription with the understanding is not to be reimbursed she is comfortable with that and a follow-up upper based on that she is to call for any additional problems from my standpoint    Past Medical History:        Past Medical History:   Diagnosis Date   • Abdominal pain    • Abdominal swelling    • Hoyos's disease    • Bipolar 1 disorder (CMS/HCC)    • Brain tumor (benign) (CMS/HCC)    • Brain tumor (CMS/HCC)     R Frontal Lobe per pt   • Brain tumor (CMS/HCC) 2014   • Constipation    • DDD (degenerative disc disease), cervical 05/29/2017   • DDD (degenerative disc disease), cervical    • Diarrhea    • Fibromyalgia    • IBS (irritable bowel syndrome)    • Migraine    • Nausea & vomiting    • PONV (postoperative nausea and vomiting)    • PTSD (post-traumatic stress disorder)    • Rectal bleeding          Current Meds:     Current Outpatient Prescriptions   Medication Sig Dispense Refill   • albuterol (PROVENTIL HFA;VENTOLIN HFA) 108 (90 Base) MCG/ACT inhaler Inhale 2 puffs 2 (Two) Times a Day.     • Azelastine HCl 137 MCG/SPRAY solution 1 spray into each nostril As Needed (Allergies).     • busPIRone (BUSPAR) 15 MG tablet Take 15 mg by mouth 3 (three) times a day        • butorphanol  (STADOL) 10 MG/ML nasal spray 1 spray into each nostril Daily.     • cetirizine (zyrTEC) 10 MG tablet Take 1 tablet by mouth Daily. 30 tablet 0   • dicyclomine (BENTYL) 10 MG capsule Take 10 mg by mouth 3 (Three) Times a Day.     • divalproex (DEPAKOTE) 500 MG DR tablet Take 1 tablet by mouth 3 (Three) Times a Day. 90 tablet 2   • fexofenadine (ALLEGRA ALLERGY) 180 MG tablet Take 1 tablet by mouth Daily. 30 tablet 0   • fluticasone (VERAMYST) 27.5 MCG/SPRAY nasal spray 2 sprays into each nostril Daily.     • guaiFENesin (MUCINEX) 600 MG 12 hr tablet Take 1 tablet by mouth Every 12 (Twelve) Hours. 28 tablet 0   • montelukast (SINGULAIR) 10 MG tablet Take 10 mg by mouth Every Night.     • pantoprazole (PROTONIX) 40 MG EC tablet Take 40 mg by mouth 2 (Two) Times a Day As Needed.     • phenazopyridine (PYRIDIUM) 100 MG tablet Take 100 mg by mouth 3 (Three) Times a Day As Needed for bladder spasms.     • sertraline (ZOLOFT) 100 MG tablet Take 100 mg by mouth 2 (Two) Times a Day.     • SUMAtriptan (IMITREX) 6 MG/0.5ML solution injection Inject 6 mg under the skin 1 (One) Time.     • tamsulosin (FLOMAX) 0.4 MG capsule 24 hr capsule Take 1 capsule by mouth Every Night. 30 capsule 0   • testosterone (TESTIM) 50 MG/5GM (1%) gel gel Place 25 mg on the skin as directed by provider Every Other Day. 5 g 2   • amoxicillin-clavulanate (AUGMENTIN) 875-125 MG per tablet Take 1 tablet by mouth 2 (Two) Times a Day. 20 tablet 0   • HYDROcodone-acetaminophen (NORCO) 7.5-325 MG per tablet Take 1 tablet by mouth 2 (Two) Times a Day As Needed for Moderate Pain . 6 tablet 0   • HYDROcodone-acetaminophen (NORCO) 7.5-325 MG per tablet Take 1 tablet by mouth Every 4 (Four) Hours As Needed for Moderate Pain . 12 tablet 0   • oxyCODONE-acetaminophen (PERCOCET)  MG per tablet Take 1 tablet by mouth Every 6 (Six) Hours As Needed for Moderate Pain . 12 tablet 0   • promethazine (PHENERGAN) 12.5 MG tablet Take 1 tablet by mouth Every 6 (Six)  Hours As Needed for Nausea or Vomiting. 20 tablet 0   • promethazine (PHENERGAN) 25 MG suppository Insert 1 suppository into the rectum Every 6 (Six) Hours As Needed for Nausea or Vomiting. 12 suppository 0     No current facility-administered medications for this visit.         Allergies:      Allergies   Allergen Reactions   • Ativan [Lorazepam] Hallucinations     confusion   • Sulfa Antibiotics Shortness Of Breath and Swelling   • Compazine [Prochlorperazine Edisylate] Hives   • Demerol [Meperidine] Hives   • Droperidol Itching   • Metoclopramide Swelling   • Toradol [Ketorolac Tromethamine] Hives and Itching   • Zofran [Ondansetron Hcl] Rash        Past Surgical History:     Past Surgical History:   Procedure Laterality Date   • ANAL SCOPE N/A 7/28/2016    Procedure: ANAL SCOPE;  Surgeon: Kael Lopez MD;  Location: [facility] OR;  Service:    • APPENDECTOMY     • COLONOSCOPY N/A 6/30/2016    Procedure: COLONOSCOPY  CPTCODE:43288;  Surgeon: Jose Antonio Belle III, MD;  Location: [facility] OR;  Service:    • COLONOSCOPY N/A 7/7/2016    Procedure: COLONOSCOPY (90827) CPT;  Surgeon: Jose Antonio Belle III, MD;  Location: [facility] OR;  Service:    • CYSTOSCOPY RETROGRADE PYELOGRAM Bilateral 4/28/2017    Procedure: CYSTOSCOPY RETROGRADE PYELOGRAM;  Surgeon: Mu Blunt MD;  Location: [facility] OR;  Service:    • ENDOSCOPY N/A 6/30/2016    Procedure: ESOPHAGOGASTRODUODENOSCOPY WITH BIOPSY  CPTCODE:11685;  Surgeon: Jose Antonio Belle III, MD;  Location: [facility] OR;  Service:    • HEMORRHOIDECTOMY N/A 7/28/2016    Procedure: HEMORRHOID STAPLING;  Surgeon: Kael Lopez MD;  Location: [facility] OR;  Service:    • HYSTERECTOMY     • KNEE SURGERY     • LAPAROSCOPIC SALPINGOOPHERECTOMY     • PORTACATH PLACEMENT N/A 7/28/2017    Procedure: INSERTION OF PORTACATH;  Surgeon: Celso Arredondo MD;  Location: [facility] OR;  Service:    • SHOULDER SURGERY      3 times         Social History:     Social History     Social  History   • Marital status:      Spouse name: N/A   • Number of children: N/A   • Years of education: N/A     Occupational History   • Not on file.     Social History Main Topics   • Smoking status: Former Smoker     Packs/day: 1.00     Years: 20.00     Quit date: 11/1/2015   • Smokeless tobacco: Never Used   • Alcohol use No   • Drug use: Yes     Types: Marijuana      Comment: for pain   • Sexual activity: Defer     Other Topics Concern   • Not on file     Social History Narrative    Lives in [place], works as a , independent of AD[employer]       Family History:     Family History   Problem Relation Age of Onset   • Crohn's disease Other    • Hypertension Other    • Diabetes Other    • Irritable bowel syndrome Other        Review of Systems:     Review of Systems   Constitutional: Negative.  Negative for activity change, appetite change, chills, diaphoresis, fatigue, fever and unexpected weight change.   HENT: Negative for congestion, dental problem, drooling, ear discharge, ear pain, facial swelling, hearing loss, mouth sores, nosebleeds, postnasal drip, rhinorrhea, sinus pressure, sneezing, sore throat, tinnitus, trouble swallowing and voice change.    Eyes: Negative.  Negative for photophobia, pain, discharge, redness, itching and visual disturbance.   Respiratory: Negative.  Negative for apnea, cough, choking, chest tightness, shortness of breath, wheezing and stridor.    Cardiovascular: Negative.  Negative for chest pain, palpitations and leg swelling.   Gastrointestinal: Negative.  Negative for abdominal distention, abdominal pain, anal bleeding, blood in stool, constipation, diarrhea, nausea, rectal pain and vomiting.   Endocrine: Negative.  Negative for cold intolerance, heat intolerance, polydipsia, polyphagia and polyuria.   Genitourinary: Positive for difficulty urinating.   Musculoskeletal: Negative.  Negative for arthralgias, back pain, gait problem, joint swelling, myalgias, neck pain and neck  stiffness.   Skin: Negative.  Negative for color change, pallor, rash and wound.   Allergic/Immunologic: Negative.  Negative for environmental allergies, food allergies and immunocompromised state.   Neurological: Negative.  Negative for dizziness, tremors, seizures, syncope, facial asymmetry, speech difficulty, weakness, light-headedness, numbness and headaches.   Hematological: Negative.  Negative for adenopathy. Does not bruise/bleed easily.   Psychiatric/Behavioral: Negative for agitation, behavioral problems, confusion, decreased concentration, dysphoric mood, hallucinations, self-injury, sleep disturbance and suicidal ideas. The patient is not nervous/anxious and is not hyperactive.    All other systems reviewed and are negative.      Physical Exam:     Physical Exam   Constitutional: She appears well-developed and well-nourished.   HENT:   Head: Normocephalic and atraumatic.   Right Ear: External ear normal.   Left Ear: External ear normal.   Mouth/Throat: Oropharynx is clear and moist.   Eyes: Pupils are equal, round, and reactive to light. Conjunctivae are normal.   Cardiovascular: Normal rate, regular rhythm, normal heart sounds and intact distal pulses.    Pulmonary/Chest: Effort normal and breath sounds normal.   Abdominal: Soft. Bowel sounds are normal. She exhibits no distension and no mass. There is no tenderness. There is no rebound and no guarding.   Genitourinary: Vagina normal. No vaginal discharge found.   Musculoskeletal: Normal range of motion.   Neurological: She is alert. She has normal reflexes.   Skin: Skin is warm and dry.   Psychiatric: She has a normal mood and affect. Her behavior is normal. Judgment and thought content normal.       I have reviewed the following portions of the patient's history: allergies, current medications, past family history, past medical history, past social history, past surgical history, problem list and ROS and confirm it's accurate.      Procedure:      Urethral dilation-after an appropriate informed consent the patient was brought to the procedure suite.  The urethra was gently anesthetized with 10 cc of 2% viscous Xylocaine jelly.  After an adequate period of topical anesthesia and dilated with Reese sounds from 16-28 Honduran sequentially without complication the patient was given gentamicin as prophylaxis with 80 mg          Assessment/Plan:   Urethral stenosis-status post dilation  Hypoactive sexual desire disorder-requests a prescription for low-dose testosterone and was given Testim 25 mg every other day to use topically in the back of her leg  Narcotic pain medication-patient has significant acute pain that I believe would be an indication for the use of narcotic pain medication.  I discussed the significant risks of pain medication and the fact that this will be a short only option and I will give her no more than a three-day supply of pain medication.  And I will not plan long-term medication and that this will be sent to a pain clinic if at all becomes necessary.  We discussed signing a pain medication agreement and the fact that we're going to run a state [facility] review to be sure the patient is not getting pain medication from elsewhere.  If this is the case we will not give pain medication.  As part of the patient's treatment plan of there being prescribed a controlled substance with potential for abuse.  This patient has been wait aware of the appropriate dose of such medications including, the risk for somnolence, limited ability to drive and/or safety and the significant potential for overdose.  It is been made clear that these medications are for the prescribed patient only without concomitant use of alcohol or other sepsis unless prescribed by the medical provider.  Has completed prescribing agreement detailing the terms of continue prescribing him a controlled substance.  Including monitoring Romeo ports, the possibility of urine drug  screens and pedal counts.  The patient is aware that we reviewed LUIS ALBERTO reports on a regular basis and scan them into the chart.  History and physical examination exhibited continued safe and appropriate use of controlled substances.  Also discussed the fact that the new [place] legislation allows only a three-day prescription for pain medication.  In this situation he will be referred to a chronic pain clinic.      Patient's Body mass index is 26.31 kg/m². BMI is above normal parameters. Recommendations include: educational material.          This document has been electronically signed by VERENICE FINK MD September 18, 2018 1:01 PM

## 2018-09-27 ENCOUNTER — HOSPITAL ENCOUNTER (EMERGENCY)
Facility: HOSPITAL | Age: 43
Discharge: HOME | End: 2018-09-27
Attending: EMERGENCY MEDICINE | Admitting: NURSE PRACTITIONER

## 2018-09-27 ENCOUNTER — HOSPITAL ENCOUNTER (EMERGENCY)
Facility: HOSPITAL | Age: 43
Discharge: HOME | End: 2018-09-27
Attending: EMERGENCY MEDICINE | Admitting: EMERGENCY MEDICINE

## 2018-09-27 VITALS
HEIGHT: 69 IN | SYSTOLIC BLOOD PRESSURE: 142 MMHG | RESPIRATION RATE: 18 BRPM | HEART RATE: 61 BPM | OXYGEN SATURATION: 97 % | WEIGHT: 160 LBS | BODY MASS INDEX: 23.7 KG/M2 | DIASTOLIC BLOOD PRESSURE: 79 MMHG | TEMPERATURE: 98.3 F

## 2018-09-27 VITALS
SYSTOLIC BLOOD PRESSURE: 113 MMHG | WEIGHT: 160 LBS | OXYGEN SATURATION: 99 % | HEIGHT: 69 IN | BODY MASS INDEX: 23.7 KG/M2 | TEMPERATURE: 98 F | HEART RATE: 66 BPM | DIASTOLIC BLOOD PRESSURE: 86 MMHG | RESPIRATION RATE: 18 BRPM

## 2018-09-27 DIAGNOSIS — G43.809 OTHER MIGRAINE WITHOUT STATUS MIGRAINOSUS, NOT INTRACTABLE: Primary | ICD-10-CM

## 2018-09-27 DIAGNOSIS — IMO0002 CHRONIC MIGRAINE: Primary | ICD-10-CM

## 2018-09-27 PROCEDURE — 99284 EMERGENCY DEPT VISIT MOD MDM: CPT

## 2018-09-27 PROCEDURE — 96375 TX/PRO/DX INJ NEW DRUG ADDON: CPT

## 2018-09-27 PROCEDURE — 63710000001 PROMETHAZINE PER 25 MG

## 2018-09-27 PROCEDURE — 25010000002 DEXAMETHASONE: Performed by: EMERGENCY MEDICINE

## 2018-09-27 PROCEDURE — 99282 EMERGENCY DEPT VISIT SF MDM: CPT

## 2018-09-27 PROCEDURE — 96365 THER/PROPH/DIAG IV INF INIT: CPT

## 2018-09-27 PROCEDURE — 25010000002 BUTORPHANOL PER 1 MG: Performed by: EMERGENCY MEDICINE

## 2018-09-27 PROCEDURE — 25010000002 DIHYDROERGOTAMINE MESYLATE PER 1 MG

## 2018-09-27 RX ORDER — DIHYDROERGOTAMINE MESYLATE 1 MG/ML
1 INJECTION, SOLUTION INTRAMUSCULAR; INTRAVENOUS; SUBCUTANEOUS ONCE
Status: COMPLETED | OUTPATIENT
Start: 2018-09-27 | End: 2018-09-27

## 2018-09-27 RX ORDER — BUTORPHANOL TARTRATE 1 MG/ML
2 INJECTION, SOLUTION INTRAMUSCULAR; INTRAVENOUS EVERY 4 HOURS PRN
Status: DISCONTINUED | OUTPATIENT
Start: 2018-09-27 | End: 2018-09-28 | Stop reason: HOSPADM

## 2018-09-27 RX ORDER — PROMETHAZINE HYDROCHLORIDE 25 MG/1
25 TABLET ORAL ONCE
Status: COMPLETED | OUTPATIENT
Start: 2018-09-27 | End: 2018-09-27

## 2018-09-27 RX ADMIN — PROMETHAZINE HYDROCHLORIDE 25 MG: 25 TABLET ORAL at 20:00

## 2018-09-27 RX ADMIN — BUTORPHANOL TARTRATE 2 MG: 1 INJECTION, SOLUTION INTRAMUSCULAR; INTRAVENOUS at 20:44

## 2018-09-27 RX ADMIN — DIHYDROERGOTAMINE MESYLATE 1 MG: 1 INJECTION, SOLUTION INTRAMUSCULAR; INTRAVENOUS; SUBCUTANEOUS at 20:48

## 2018-09-27 RX ADMIN — DEXAMETHASONE SODIUM PHOSPHATE 10 MG: 10 INJECTION INTRAMUSCULAR; INTRAVENOUS at 20:53

## 2018-09-27 RX ADMIN — SODIUM CHLORIDE 1000 ML: 9 INJECTION, SOLUTION INTRAVENOUS at 20:44

## 2018-10-13 NOTE — ED PROVIDER NOTES
Subjective   Pt here with recurrent chronic migraine. States this particular episode has been intermittent over the last 15 days. States that she has had n/v. States this feels like a typical migraine. No numbness/tingling, blurred vision.         History provided by:  Patient   used: No    Migraine   Pain location:  Generalized  Severity currently:  10/10  Onset quality:  Gradual  Duration:  15 days  Timing:  Intermittent  Progression:  Waxing and waning  Chronicity:  Recurrent  Similar to prior headaches: yes    Context: bright light and loud noise    Relieved by:  Nothing  Worsened by:  Light, sound and activity  Ineffective treatments:  None tried  Associated symptoms: nausea and vomiting    Associated symptoms: no blurred vision, no congestion, no cough, no diarrhea, no dizziness, no eye pain, no fever, no focal weakness, no myalgias, no neck stiffness, no paresthesias and no sore throat    Risk factors: no anger and no family hx of SAH        Review of Systems   Constitutional: Negative for activity change, chills and fever.   HENT: Negative for congestion, rhinorrhea and sore throat.    Eyes: Negative for blurred vision, pain and redness.   Respiratory: Negative for cough, shortness of breath and wheezing.    Cardiovascular: Negative for chest pain.   Gastrointestinal: Positive for nausea and vomiting. Negative for abdominal distention and diarrhea.   Endocrine: Negative for polyphagia and polyuria.   Genitourinary: Negative for difficulty urinating and dysuria.   Musculoskeletal: Negative for arthralgias, myalgias and neck stiffness.   Skin: Negative for rash and wound.   Allergic/Immunologic: Negative for food allergies and immunocompromised state.   Neurological: Negative for dizziness, focal weakness, headaches and paresthesias.   Hematological: Negative for adenopathy. Bruises/bleeds easily.   Psychiatric/Behavioral: Negative for agitation.   All other systems reviewed and are  negative.      Past Medical History:   Diagnosis Date   • Abdominal pain    • Abdominal swelling    • Hoyos's disease    • Bipolar 1 disorder (CMS/HCC)    • Brain tumor (benign) (CMS/HCC)    • Brain tumor (CMS/HCC)     R Frontal Lobe per pt   • Brain tumor (CMS/HCC) 2014   • Constipation    • DDD (degenerative disc disease), cervical 05/29/2017   • DDD (degenerative disc disease), cervical    • Diarrhea    • Fibromyalgia    • IBS (irritable bowel syndrome)    • Migraine    • Nausea & vomiting    • PONV (postoperative nausea and vomiting)    • PTSD (post-traumatic stress disorder)    • Rectal bleeding        Allergies   Allergen Reactions   • Ativan [Lorazepam] Hallucinations     confusion   • Sulfa Antibiotics Shortness Of Breath and Swelling   • Compazine [Prochlorperazine Edisylate] Hives   • Demerol [Meperidine] Hives   • Droperidol Itching   • Metoclopramide Swelling   • Toradol [Ketorolac Tromethamine] Hives and Itching   • Zofran [Ondansetron Hcl] Rash       Past Surgical History:   Procedure Laterality Date   • ANAL SCOPE N/A 7/28/2016    Procedure: ANAL SCOPE;  Surgeon: Kael Lopez MD;  Location: [facility] OR;  Service:    • APPENDECTOMY     • COLONOSCOPY N/A 6/30/2016    Procedure: COLONOSCOPY  CPTCODE:71360;  Surgeon: Jose Antonio Belle III, MD;  Location: [facility] OR;  Service:    • COLONOSCOPY N/A 7/7/2016    Procedure: COLONOSCOPY (99143) CPT;  Surgeon: Jose Antonio Belle III, MD;  Location: [facility] OR;  Service:    • CYSTOSCOPY RETROGRADE PYELOGRAM Bilateral 4/28/2017    Procedure: CYSTOSCOPY RETROGRADE PYELOGRAM;  Surgeon: Mu Blunt MD;  Location: [facility] OR;  Service:    • ENDOSCOPY N/A 6/30/2016    Procedure: ESOPHAGOGASTRODUODENOSCOPY WITH BIOPSY  CPTCODE:40578;  Surgeon: Jose Antonio Belle III, MD;  Location: [facility] OR;  Service:    • HEMORRHOIDECTOMY N/A 7/28/2016    Procedure: HEMORRHOID STAPLING;  Surgeon: Kael Lopez MD;  Location: [facility] OR;  Service:    • HYSTERECTOMY     •  KNEE SURGERY     • LAPAROSCOPIC SALPINGOOPHERECTOMY     • PORTACATH PLACEMENT N/A 7/28/2017    Procedure: INSERTION OF PORTACATH;  Surgeon: Celso Arredondo MD;  Location: [facility];  Service:    • SHOULDER SURGERY      3 times       Family History   Problem Relation Age of Onset   • Crohn's disease Other    • Hypertension Other    • Diabetes Other    • Irritable bowel syndrome Other        Social History     Social History   • Marital status:      Social History Main Topics   • Smoking status: Former Smoker     Packs/day: 1.00     Years: 20.00     Quit date: 11/1/2015   • Smokeless tobacco: Never Used   • Alcohol use No   • Drug use: Yes     Types: Marijuana      Comment: for pain   • Sexual activity: Defer     Other Topics Concern   • Not on file     Social History Narrative    Lives in [place], works as a , independent of ADL           Objective   Physical Exam   Constitutional: She is oriented to person, place, and time. She appears well-developed and well-nourished.   HENT:   Head: Normocephalic and atraumatic.   Eyes: Pupils are equal, round, and reactive to light. EOM are normal.   Neck: Normal range of motion. Neck supple.   Cardiovascular: Normal rate, regular rhythm and normal heart sounds.    Pulmonary/Chest: Effort normal and breath sounds normal.   Abdominal: Soft. Bowel sounds are normal.   Musculoskeletal: Normal range of motion.   Neurological: She is alert and oriented to person, place, and time. GCS eye subscore is 4. GCS verbal subscore is 5. GCS motor subscore is 6.   Skin: Skin is warm and dry.   Psychiatric: She has a normal mood and affect. Her behavior is normal. Judgment and thought content normal.   Nursing note and vitals reviewed.      Procedures           ED Course  ED Course as of Oct 13 1409   Thu Sep 27, 2018   1401 Pt's migraine symptoms are completely unchanged from her usual. She has no neurologic deficits. She is completely a/ox3. She was requesting stadol by name,  in addition to phenergan, DHE, and decadron. I discussed with patient that we are not able to give narcotic pain medicine for chronic pain. She became upset that I would not give narcotics, and she said she did not want to stay for anything further, she would go somewhere else instead. I offered to give pt IVF, DHE, phenergan, and decadron prior to discharge, but she is now refusing any treatment.   [RUTH]      ED Course User Index  [RUTH] Jaspreet Singh PA                  MDM  Number of Diagnoses or Management Options  Chronic migraine:      Amount and/or Complexity of Data Reviewed  Clinical lab tests: ordered and reviewed  Tests in the radiology section of CPT®: ordered and reviewed  Tests in the medicine section of CPT®: ordered and reviewed    Patient Progress  Patient progress: stable        Final diagnoses:   Chronic migraine            Jaspreet Singh PA  10/13/18 9100

## 2018-11-03 ENCOUNTER — HOSPITAL ENCOUNTER (EMERGENCY)
Facility: HOSPITAL | Age: 43
Discharge: HOME | End: 2018-11-03
Attending: FAMILY MEDICINE | Admitting: FAMILY MEDICINE

## 2018-11-03 ENCOUNTER — APPOINTMENT (OUTPATIENT)
Dept: CT IMAGING | Facility: HOSPITAL | Age: 43
End: 2018-11-03

## 2018-11-03 VITALS
DIASTOLIC BLOOD PRESSURE: 68 MMHG | SYSTOLIC BLOOD PRESSURE: 108 MMHG | RESPIRATION RATE: 18 BRPM | OXYGEN SATURATION: 98 % | TEMPERATURE: 98 F | BODY MASS INDEX: 22.22 KG/M2 | HEART RATE: 68 BPM | WEIGHT: 150 LBS | HEIGHT: 69 IN

## 2018-11-03 DIAGNOSIS — N30.01 ACUTE CYSTITIS WITH HEMATURIA: ICD-10-CM

## 2018-11-03 DIAGNOSIS — R31.9 HEMATURIA, UNSPECIFIED TYPE: ICD-10-CM

## 2018-11-03 DIAGNOSIS — N23 RENAL COLIC ON LEFT SIDE: Primary | ICD-10-CM

## 2018-11-03 LAB
ALBUMIN SERPL-MCNC: 4.2 G/DL (ref 3.5–5)
ALBUMIN/GLOB SERPL: 1.8 G/DL (ref 1.5–2.5)
ALP SERPL-CCNC: 58 U/L (ref 35–104)
ALT SERPL W P-5'-P-CCNC: 70 U/L (ref 10–36)
ANION GAP SERPL CALCULATED.3IONS-SCNC: 3.9 MMOL/L (ref 3.6–11.2)
AST SERPL-CCNC: 55 U/L (ref 10–30)
BACTERIA UR QL AUTO: ABNORMAL /HPF
BASOPHILS # BLD AUTO: 0.01 10*3/MM3 (ref 0–0.3)
BASOPHILS NFR BLD AUTO: 0.2 % (ref 0–2)
BILIRUB SERPL-MCNC: 0.7 MG/DL (ref 0.2–1.8)
BILIRUB UR QL STRIP: NEGATIVE
BUN BLD-MCNC: 11 MG/DL (ref 7–21)
BUN/CREAT SERPL: 21.6 (ref 7–25)
CALCIUM SPEC-SCNC: 9 MG/DL (ref 7.7–10)
CHLORIDE SERPL-SCNC: 109 MMOL/L (ref 99–112)
CLARITY UR: ABNORMAL
CO2 SERPL-SCNC: 26.1 MMOL/L (ref 24.3–31.9)
COLOR UR: ABNORMAL
CREAT BLD-MCNC: 0.51 MG/DL (ref 0.43–1.29)
DEPRECATED RDW RBC AUTO: 44.5 FL (ref 37–54)
EOSINOPHIL # BLD AUTO: 0.12 10*3/MM3 (ref 0–0.7)
EOSINOPHIL NFR BLD AUTO: 2.4 % (ref 0–5)
ERYTHROCYTE [DISTWIDTH] IN BLOOD BY AUTOMATED COUNT: 14.7 % (ref 11.5–14.5)
GFR SERPL CREATININE-BSD FRML MDRD: 132 ML/MIN/1.73
GLOBULIN UR ELPH-MCNC: 2.3 GM/DL
GLUCOSE BLD-MCNC: 87 MG/DL (ref 70–110)
GLUCOSE UR STRIP-MCNC: NEGATIVE MG/DL
HCT VFR BLD AUTO: 37.7 % (ref 37–47)
HGB BLD-MCNC: 12.3 G/DL (ref 12–16)
HGB UR QL STRIP.AUTO: ABNORMAL
HOLD SPECIMEN: NORMAL
HOLD SPECIMEN: NORMAL
HYALINE CASTS UR QL AUTO: ABNORMAL /LPF
IMM GRANULOCYTES # BLD: 0.01 10*3/MM3 (ref 0–0.03)
IMM GRANULOCYTES NFR BLD: 0.2 % (ref 0–0.5)
KETONES UR QL STRIP: NEGATIVE
LEUKOCYTE ESTERASE UR QL STRIP.AUTO: ABNORMAL
LIPASE SERPL-CCNC: 42 U/L (ref 13–60)
LYMPHOCYTES # BLD AUTO: 1.63 10*3/MM3 (ref 1–3)
LYMPHOCYTES NFR BLD AUTO: 32 % (ref 21–51)
MCH RBC QN AUTO: 27.7 PG (ref 27–33)
MCHC RBC AUTO-ENTMCNC: 32.6 G/DL (ref 33–37)
MCV RBC AUTO: 84.9 FL (ref 80–94)
MONOCYTES # BLD AUTO: 0.5 10*3/MM3 (ref 0.1–0.9)
MONOCYTES NFR BLD AUTO: 9.8 % (ref 0–10)
NEUTROPHILS # BLD AUTO: 2.82 10*3/MM3 (ref 1.4–6.5)
NEUTROPHILS NFR BLD AUTO: 55.4 % (ref 30–70)
NITRITE UR QL STRIP: NEGATIVE
OSMOLALITY SERPL CALC.SUM OF ELEC: 276.3 MOSM/KG (ref 273–305)
PH UR STRIP.AUTO: 6 [PH] (ref 5–8)
PLATELET # BLD AUTO: 124 10*3/MM3 (ref 130–400)
PMV BLD AUTO: 11.5 FL (ref 6–10)
POTASSIUM BLD-SCNC: 3.4 MMOL/L (ref 3.5–5.3)
PROT SERPL-MCNC: 6.5 G/DL (ref 6–8)
PROT UR QL STRIP: NEGATIVE
RBC # BLD AUTO: 4.44 10*6/MM3 (ref 4.2–5.4)
RBC # UR: ABNORMAL /HPF
REF LAB TEST METHOD: ABNORMAL
SODIUM BLD-SCNC: 139 MMOL/L (ref 135–153)
SP GR UR STRIP: 1.02 (ref 1–1.03)
SQUAMOUS #/AREA URNS HPF: ABNORMAL /HPF
UROBILINOGEN UR QL STRIP: ABNORMAL
WBC NRBC COR # BLD: 5.09 10*3/MM3 (ref 4.5–12.5)
WBC UR QL AUTO: ABNORMAL /HPF
WHOLE BLOOD HOLD SPECIMEN: NORMAL
WHOLE BLOOD HOLD SPECIMEN: NORMAL

## 2018-11-03 PROCEDURE — 96375 TX/PRO/DX INJ NEW DRUG ADDON: CPT

## 2018-11-03 PROCEDURE — 25010000002 PROMETHAZINE PER 50 MG: Performed by: FAMILY MEDICINE

## 2018-11-03 PROCEDURE — 99284 EMERGENCY DEPT VISIT MOD MDM: CPT

## 2018-11-03 PROCEDURE — 25010000002 HYDROMORPHONE PER 4 MG: Performed by: FAMILY MEDICINE

## 2018-11-03 PROCEDURE — 74176 CT ABD & PELVIS W/O CONTRAST: CPT

## 2018-11-03 PROCEDURE — 96376 TX/PRO/DX INJ SAME DRUG ADON: CPT

## 2018-11-03 PROCEDURE — 80053 COMPREHEN METABOLIC PANEL: CPT | Performed by: FAMILY MEDICINE

## 2018-11-03 PROCEDURE — 81001 URINALYSIS AUTO W/SCOPE: CPT | Performed by: FAMILY MEDICINE

## 2018-11-03 PROCEDURE — 83690 ASSAY OF LIPASE: CPT | Performed by: FAMILY MEDICINE

## 2018-11-03 PROCEDURE — 74176 CT ABD & PELVIS W/O CONTRAST: CPT | Performed by: RADIOLOGY

## 2018-11-03 PROCEDURE — 96374 THER/PROPH/DIAG INJ IV PUSH: CPT

## 2018-11-03 PROCEDURE — 85025 COMPLETE CBC W/AUTO DIFF WBC: CPT | Performed by: FAMILY MEDICINE

## 2018-11-03 RX ORDER — CEPHALEXIN 500 MG/1
500 CAPSULE ORAL 2 TIMES DAILY
Qty: 14 CAPSULE | Refills: 0 | OUTPATIENT
Start: 2018-11-03 | End: 2019-01-11

## 2018-11-03 RX ORDER — SODIUM CHLORIDE 0.9 % (FLUSH) 0.9 %
10 SYRINGE (ML) INJECTION AS NEEDED
Status: DISCONTINUED | OUTPATIENT
Start: 2018-11-03 | End: 2018-11-03 | Stop reason: HOSPADM

## 2018-11-03 RX ORDER — PROMETHAZINE HYDROCHLORIDE 25 MG/ML
6.25 INJECTION, SOLUTION INTRAMUSCULAR; INTRAVENOUS ONCE
Status: COMPLETED | OUTPATIENT
Start: 2018-11-03 | End: 2018-11-03

## 2018-11-03 RX ORDER — HYDROMORPHONE HYDROCHLORIDE 1 MG/ML
0.5 INJECTION, SOLUTION INTRAMUSCULAR; INTRAVENOUS; SUBCUTANEOUS ONCE
Status: COMPLETED | OUTPATIENT
Start: 2018-11-03 | End: 2018-11-03

## 2018-11-03 RX ADMIN — PROMETHAZINE HYDROCHLORIDE 6.25 MG: 25 INJECTION, SOLUTION INTRAMUSCULAR; INTRAVENOUS at 15:33

## 2018-11-03 RX ADMIN — HYDROMORPHONE HYDROCHLORIDE 0.5 MG: 1 INJECTION, SOLUTION INTRAMUSCULAR; INTRAVENOUS; SUBCUTANEOUS at 15:36

## 2018-11-03 RX ADMIN — HYDROMORPHONE HYDROCHLORIDE 0.5 MG: 1 INJECTION, SOLUTION INTRAMUSCULAR; INTRAVENOUS; SUBCUTANEOUS at 17:23

## 2018-11-03 RX ADMIN — HEPARIN SODIUM (PORCINE) LOCK FLUSH IV SOLN 100 UNIT/ML 300 UNITS: 100 SOLUTION at 17:27

## 2018-11-03 RX ADMIN — SODIUM CHLORIDE 1000 ML: 9 INJECTION, SOLUTION INTRAVENOUS at 15:38

## 2018-11-03 NOTE — ED PROVIDER NOTES
Subjective   43-year-old female with a history of Buerger's disease, kidney stones presents the emergency room with left flank pain that began yesterday.  Patient states that she was scheduled to have a dilation procedure with Dr. Stephen with urology on October 30 but procedure was canceled due Urologist  being out of town.  She states that she receives dilations every 6 weeks due to her history of Buerger's disease.  She states that she does intermittently catheterize herself as well she states today she noticed in addition to her flank pain that she had blood in her urine.  She states she's had decreased urine output.  She did reports that she had a fever 102 yesterday.  She states she's been alternating Tylenol Motrin.  She states symptoms are similar to when she's had kidney stones.  She denies any chest pain or shortness of breath.  Due to ongoing symptoms she came emergency room for evaluation.        Flank Pain   Pain location:  L flank  Pain quality: sharp    Pain severity:  Moderate  Timing:  Constant  Progression:  Unchanged  Chronicity:  New  Relieved by:  Nothing  Worsened by:  Nothing  Associated symptoms: fever and hematuria    Associated symptoms: no chest pain, no cough, no diarrhea, no dysuria, no nausea, no shortness of breath, no sore throat and no vomiting        Review of Systems   Constitutional: Positive for fever. Negative for activity change.   HENT: Negative for congestion, sore throat and tinnitus.    Eyes: Negative for visual disturbance.   Respiratory: Negative for apnea, cough, shortness of breath, wheezing and stridor.    Cardiovascular: Negative for chest pain.   Gastrointestinal: Negative for abdominal pain, diarrhea, nausea and vomiting.   Genitourinary: Positive for flank pain, hematuria and urgency. Negative for dysuria.   Musculoskeletal: Negative for arthralgias and myalgias.   Skin: Negative for rash.   Neurological: Negative for dizziness, weakness and light-headedness.    Hematological: Negative for adenopathy.   Psychiatric/Behavioral: Negative for agitation.   All other systems reviewed and are negative.      Past Medical History:   Diagnosis Date   • Abdominal pain    • Abdominal swelling    • Hoyos's disease    • Bipolar 1 disorder (CMS/HCC)    • Brain tumor (benign) (CMS/HCC)    • Brain tumor (CMS/HCC)     R Frontal Lobe per pt   • Brain tumor (CMS/HCC) 2014   • Constipation    • DDD (degenerative disc disease), cervical 05/29/2017   • DDD (degenerative disc disease), cervical    • Diarrhea    • Fibromyalgia    • IBS (irritable bowel syndrome)    • Migraine    • Nausea & vomiting    • PONV (postoperative nausea and vomiting)    • PTSD (post-traumatic stress disorder)    • Rectal bleeding        Allergies   Allergen Reactions   • Ativan [Lorazepam] Hallucinations     confusion   • Sulfa Antibiotics Shortness Of Breath and Swelling   • Compazine [Prochlorperazine Edisylate] Hives   • Demerol [Meperidine] Hives   • Droperidol Itching   • Metoclopramide Swelling   • Toradol [Ketorolac Tromethamine] Hives and Itching   • Zofran [Ondansetron Hcl] Rash       Past Surgical History:   Procedure Laterality Date   • ANAL SCOPE N/A 7/28/2016    Procedure: ANAL SCOPE;  Surgeon: Kael Lopez MD;  Location: [facility] OR;  Service:    • APPENDECTOMY     • COLONOSCOPY N/A 6/30/2016    Procedure: COLONOSCOPY  CPTCODE:15721;  Surgeon: Jose Antonio Belle III, MD;  Location: [facility] OR;  Service:    • COLONOSCOPY N/A 7/7/2016    Procedure: COLONOSCOPY (66613) CPT;  Surgeon: Jose Antonio Belle III, MD;  Location: [facility] OR;  Service:    • CYSTOSCOPY RETROGRADE PYELOGRAM Bilateral 4/28/2017    Procedure: CYSTOSCOPY RETROGRADE PYELOGRAM;  Surgeon: Mu Blunt MD;  Location: [facility] OR;  Service:    • ENDOSCOPY N/A 6/30/2016    Procedure: ESOPHAGOGASTRODUODENOSCOPY WITH BIOPSY  CPTCODE:13945;  Surgeon: Jose Antonio Belle III, MD;  Location: [facility] OR;  Service:    • HEMORRHOIDECTOMY N/A  7/28/2016    Procedure: HEMORRHOID STAPLING;  Surgeon: Kael Lopez MD;  Location: [facility] OR;  Service:    • HYSTERECTOMY     • KNEE SURGERY     • LAPAROSCOPIC SALPINGOOPHERECTOMY     • PORTACATH PLACEMENT N/A 7/28/2017    Procedure: INSERTION OF PORTACATH;  Surgeon: Celso Arredondo MD;  Location: [facility];  Service:    • SHOULDER SURGERY      3 times       Family History   Problem Relation Age of Onset   • Crohn's disease Other    • Hypertension Other    • Diabetes Other    • Irritable bowel syndrome Other        Social History     Social History   • Marital status:      Social History Main Topics   • Smoking status: Former Smoker     Packs/day: 1.00     Years: 20.00     Quit date: 11/1/2015   • Smokeless tobacco: Never Used   • Alcohol use No   • Drug use: Yes     Types: Marijuana      Comment: for pain   • Sexual activity: Defer     Other Topics Concern   • Not on file     Social History Narrative    Lives in [place], works as a , independent of ADL           Objective   Physical Exam   Constitutional: She is oriented to person, place, and time. She appears well-developed and well-nourished. No distress.   HENT:   Mouth/Throat: Uvula is midline, oropharynx is clear and moist and mucous membranes are normal.   Eyes: Pupils are equal, round, and reactive to light. EOM are normal.   Neck: Neck supple. No JVD present.   Cardiovascular: Normal rate, regular rhythm and normal heart sounds.  Exam reveals no friction rub.    No murmur heard.  Pulmonary/Chest: Effort normal and breath sounds normal. No respiratory distress. She has no wheezes. She has no rales.   Abdominal: Soft. Bowel sounds are normal. There is tenderness in the left upper quadrant and left lower quadrant. There is no rigidity, no rebound, no guarding and no CVA tenderness.   Musculoskeletal: Normal range of motion. She exhibits no edema.   Neurological: She is alert and oriented to person, place, and time. No cranial nerve deficit  or sensory deficit.   Skin: Skin is warm and dry. No rash noted. She is not diaphoretic. No pallor.   Psychiatric: She has a normal mood and affect. Her behavior is normal.   Nursing note and vitals reviewed.      Procedures           ED Course  ED Course as of Nov 03 1626   Sat Nov 03, 2018   1611 Patient given IV Phenergan and Dilaudid while emergency room patient has had initial improvement of pain but now reports pain is return.  CT abdomen and pelvis still pending.  We'll continue to monitor   [BB]   1618 Patient CT scan of abdomen and pelvis shows no acute finding no renal or ureteral calculus.  I discussed hematuria and flank pain possibly related to recently passed stone.  Patient did have supplemental blood as well as leukocyte esterase in her urine.  I discussed need for follow-up with urology.  Patient verbalizes understanding.  She is are establish care with Dr. Mac.  Patient is afebrile and nontoxic appearing at this time.  Have discussed need for follow-up were discussed warning symptoms or return patient verbalizes understanding.  [BB]   1620 [facility] #[id number]  [BB]      ED Course User Index  [BB] Earnest Renae MD                  MDM  Number of Diagnoses or Management Options  Acute cystitis with hematuria: new and does not require workup  Hematuria, unspecified type: new and requires workup  Renal colic on left side: new and requires workup     Amount and/or Complexity of Data Reviewed  Clinical lab tests: reviewed and ordered  Tests in the radiology section of CPT®: ordered and reviewed    Risk of Complications, Morbidity, and/or Mortality  Presenting problems: moderate  Diagnostic procedures: moderate  Management options: moderate    Patient Progress  Patient progress: stable        Final diagnoses:   None            Earnest Renae MD  11/03/18 1620

## 2018-11-09 ENCOUNTER — OFFICE VISIT (OUTPATIENT)
Dept: UROLOGY | Facility: CLINIC | Age: 43
End: 2018-11-09

## 2018-11-09 VITALS — BODY MASS INDEX: 22.22 KG/M2 | WEIGHT: 150 LBS | HEIGHT: 69 IN

## 2018-11-09 DIAGNOSIS — N35.028 OTHER POST-TRAUMATIC URETHRAL STRICTURE, FEMALE: Primary | ICD-10-CM

## 2018-11-09 DIAGNOSIS — Z48.816 AFTERCARE FOLLOWING SURGERY OF THE GENITOURINARY SYSTEM: ICD-10-CM

## 2018-11-09 PROCEDURE — 96372 THER/PROPH/DIAG INJ SC/IM: CPT | Performed by: UROLOGY

## 2018-11-09 PROCEDURE — 53661 DILATION OF URETHRA: CPT | Performed by: UROLOGY

## 2018-11-09 RX ORDER — OXYCODONE AND ACETAMINOPHEN 10; 325 MG/1; MG/1
1 TABLET ORAL EVERY 6 HOURS PRN
Qty: 8 TABLET | Refills: 0 | Status: SHIPPED | OUTPATIENT
Start: 2018-11-09 | End: 2018-12-21 | Stop reason: SDUPTHER

## 2018-11-09 RX ORDER — GENTAMICIN SULFATE 40 MG/ML
80 INJECTION, SOLUTION INTRAMUSCULAR; INTRAVENOUS ONCE
Status: COMPLETED | OUTPATIENT
Start: 2018-11-09 | End: 2018-11-09

## 2018-11-09 RX ADMIN — GENTAMICIN SULFATE 80 MG: 40 INJECTION, SOLUTION INTRAMUSCULAR; INTRAVENOUS at 11:02

## 2018-11-09 NOTE — PROGRESS NOTES
Chief Complaint:          Chief Complaint   Patient presents with   • Uretheral Stenosis       HPI:   43 y.o. female.  43 year old white female. Accompanied by  with IgA nephropathy, hepatitis C, severe urethral stenosis who presents for urethral dilation.  She is desirous of more pain medication but I explained to her carefully as long as she is on hydrocodone and I will not give her any more pain medication.  And if she's not on it she can only have several pills around the time of her dilation.  I also explained to her there is no permanent fix of the situation other than self dilatation which she refuses to do.Today she wants to learn to do intermittent catheter.  She gets pain medication from her primary care provider and she will be given pain medication only for short supply after painful dilatation this is been discussed with her primary care physician he will have dilatation no more than every 6 weeks in this office.  She returns today for follow-up.  She is now doing dramatically better.  She is now catheterizing herself intermittently.  It's helped dramatically I went ahead after prep and drape in a sterile fashion and dilated her sequentially to 24 Faroese which was the max she can take.  There were no complications.  Overall, she's doing well, she is not gotten into see the nephrologist as yet for inexplicable reasons.  I reaffirmed the importance of a workup of her IgA nephropathy.  She is here for follow-up she wants to be dilated.  I went ahead and recommended this.  She was dilated without complication to 26 Faroese.  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  He also complains of significant loss of libido and requests a testosterone  injection.  And a long discussion of the complications including clitorimegaly, hair growth, nipple hair growth etc. and she wishes to proceed.  She got a nice response from a testosterone and wants it repeated at a low dose she's here for dilation which was accomplished without complication  She returns today for dilation.  Additionally she felt that testosterone worked well but unfortunately we don't do it in the office anymore and she's going to see her primary care physician for a prescription for Premarin which she was on previously and worked dramatically well.  She's here for dilation and is done extremely well I went ahead and dilated to 28 French.  She does well with the topical testosterone I gave her prescription with the understanding is not to be reimbursed she is comfortable with that and a follow-up upper based on that she is to call for any additional problems from my standpoint    Past Medical History:        Past Medical History:   Diagnosis Date   • Abdominal pain    • Abdominal swelling    • Hoyos's disease    • Bipolar 1 disorder (CMS/HCC)    • Brain tumor (benign) (CMS/HCC)    • Brain tumor (CMS/HCC)     R Frontal Lobe per pt   • Brain tumor (CMS/HCC) 2014   • Constipation    • DDD (degenerative disc disease), cervical 05/29/2017   • DDD (degenerative disc disease), cervical    • Diarrhea    • Fibromyalgia    • IBS (irritable bowel syndrome)    • Migraine    • Nausea & vomiting    • PONV (postoperative nausea and vomiting)    • PTSD (post-traumatic stress disorder)    • Rectal bleeding          Current Meds:     Current Outpatient Prescriptions   Medication Sig Dispense Refill   • albuterol (PROVENTIL HFA;VENTOLIN HFA) 108 (90 Base) MCG/ACT inhaler Inhale 2 puffs 2 (Two) Times a Day.     • amoxicillin-clavulanate (AUGMENTIN) 875-125 MG per tablet Take 1 tablet by mouth 2 (Two) Times a Day. 20 tablet 0   • Azelastine HCl 137 MCG/SPRAY solution 1 spray into each nostril As Needed (Allergies).      • busPIRone (BUSPAR) 15 MG tablet Take 15 mg by mouth 3 (three) times a day        • butorphanol (STADOL) 10 MG/ML nasal spray 1 spray into each nostril Daily.     • cephalexin (KEFLEX) 500 MG capsule Take 1 capsule by mouth 2 (Two) Times a Day. 14 capsule 0   • cetirizine (zyrTEC) 10 MG tablet Take 1 tablet by mouth Daily. 30 tablet 0   • dicyclomine (BENTYL) 10 MG capsule Take 10 mg by mouth 3 (Three) Times a Day.     • divalproex (DEPAKOTE) 500 MG DR tablet Take 1 tablet by mouth 3 (Three) Times a Day. 90 tablet 2   • fexofenadine (ALLEGRA ALLERGY) 180 MG tablet Take 1 tablet by mouth Daily. 30 tablet 0   • fluticasone (VERAMYST) 27.5 MCG/SPRAY nasal spray 2 sprays into each nostril Daily.     • guaiFENesin (MUCINEX) 600 MG 12 hr tablet Take 1 tablet by mouth Every 12 (Twelve) Hours. 28 tablet 0   • HYDROcodone-acetaminophen (NORCO) 7.5-325 MG per tablet Take 1 tablet by mouth 2 (Two) Times a Day As Needed for Moderate Pain . 6 tablet 0   • HYDROcodone-acetaminophen (NORCO) 7.5-325 MG per tablet Take 1 tablet by mouth Every 4 (Four) Hours As Needed for Moderate Pain . 12 tablet 0   • montelukast (SINGULAIR) 10 MG tablet Take 10 mg by mouth Every Night.     • oxyCODONE-acetaminophen (PERCOCET)  MG per tablet Take 1 tablet by mouth Every 6 (Six) Hours As Needed for Moderate Pain . 8 tablet 0   • pantoprazole (PROTONIX) 40 MG EC tablet Take 40 mg by mouth 2 (Two) Times a Day As Needed.     • phenazopyridine (PYRIDIUM) 100 MG tablet Take 100 mg by mouth 3 (Three) Times a Day As Needed for bladder spasms.     • promethazine (PHENERGAN) 12.5 MG tablet Take 1 tablet by mouth Every 6 (Six) Hours As Needed for Nausea or Vomiting. 20 tablet 0   • promethazine (PHENERGAN) 25 MG suppository Insert 1 suppository into the rectum Every 6 (Six) Hours As Needed for Nausea or Vomiting. 12 suppository 0   • sertraline (ZOLOFT) 100 MG tablet Take 100 mg by mouth 2 (Two) Times a Day.     • SUMAtriptan (IMITREX) 6 MG/0.5ML  solution injection Inject 6 mg under the skin 1 (One) Time.     • tamsulosin (FLOMAX) 0.4 MG capsule 24 hr capsule Take 1 capsule by mouth Every Night. 30 capsule 0   • testosterone (TESTIM) 50 MG/5GM (1%) gel gel Place 25 mg on the skin as directed by provider Every Other Day. 5 g 2     No current facility-administered medications for this visit.         Allergies:      Allergies   Allergen Reactions   • Ativan [Lorazepam] Hallucinations     confusion   • Sulfa Antibiotics Shortness Of Breath and Swelling   • Compazine [Prochlorperazine Edisylate] Hives   • Demerol [Meperidine] Hives   • Droperidol Itching   • Metoclopramide Swelling   • Toradol [Ketorolac Tromethamine] Hives and Itching   • Zofran [Ondansetron Hcl] Rash        Past Surgical History:     Past Surgical History:   Procedure Laterality Date   • ANAL SCOPE N/A 7/28/2016    Procedure: ANAL SCOPE;  Surgeon: Kael Lopez MD;  Location: [facility] OR;  Service:    • APPENDECTOMY     • COLONOSCOPY N/A 6/30/2016    Procedure: COLONOSCOPY  CPTCODE:75546;  Surgeon: Jose Antonio Belle III, MD;  Location: [facility] OR;  Service:    • COLONOSCOPY N/A 7/7/2016    Procedure: COLONOSCOPY (93212) CPT;  Surgeon: Jose Antonio Belle III, MD;  Location: [facility] OR;  Service:    • CYSTOSCOPY RETROGRADE PYELOGRAM Bilateral 4/28/2017    Procedure: CYSTOSCOPY RETROGRADE PYELOGRAM;  Surgeon: Mu Blunt MD;  Location: [facility] OR;  Service:    • ENDOSCOPY N/A 6/30/2016    Procedure: ESOPHAGOGASTRODUODENOSCOPY WITH BIOPSY  CPTCODE:16304;  Surgeon: Jose Antonio Belle III, MD;  Location: [facility] OR;  Service:    • HEMORRHOIDECTOMY N/A 7/28/2016    Procedure: HEMORRHOID STAPLING;  Surgeon: Kael Lopez MD;  Location: [facility] OR;  Service:    • HYSTERECTOMY     • KNEE SURGERY     • LAPAROSCOPIC SALPINGOOPHERECTOMY     • PORTACATH PLACEMENT N/A 7/28/2017    Procedure: INSERTION OF PORTACATH;  Surgeon: Celso Arredondo MD;  Location: [facility] OR;  Service:    • SHOULDER  SURGERY      3 times         Social History:     Social History     Social History   • Marital status:      Spouse name: N/A   • Number of children: N/A   • Years of education: N/A     Occupational History   • Not on file.     Social History Main Topics   • Smoking status: Former Smoker     Packs/day: 1.00     Years: 20.00     Quit date: 11/1/2015   • Smokeless tobacco: Never Used   • Alcohol use No   • Drug use: Yes     Types: Marijuana      Comment: for pain   • Sexual activity: Defer     Other Topics Concern   • Not on file     Social History Narrative    Lives in [place], works as a , independent of ADL       Family History:     Family History   Problem Relation Age of Onset   • Crohn's disease Other    • Hypertension Other    • Diabetes Other    • Irritable bowel syndrome Other        Review of Systems:     Review of Systems   Constitutional: Negative.  Negative for activity change, appetite change, chills, diaphoresis, fatigue, fever and unexpected weight change.   HENT: Negative for congestion, dental problem, drooling, ear discharge, ear pain, facial swelling, hearing loss, mouth sores, nosebleeds, postnasal drip, rhinorrhea, sinus pressure, sneezing, sore throat, tinnitus, trouble swallowing and voice change.    Eyes: Negative.  Negative for photophobia, pain, discharge, redness, itching and visual disturbance.   Respiratory: Negative.  Negative for apnea, cough, choking, chest tightness, shortness of breath, wheezing and stridor.    Cardiovascular: Negative.  Negative for chest pain, palpitations and leg swelling.   Gastrointestinal: Negative.  Negative for abdominal distention, abdominal pain, anal bleeding, blood in stool, constipation, diarrhea, nausea, rectal pain and vomiting.   Endocrine: Negative.  Negative for cold intolerance, heat intolerance, polydipsia, polyphagia and polyuria.   Genitourinary: Positive for hematuria and pelvic pain.   Musculoskeletal: Negative.  Negative for  arthralgias, back pain, gait problem, joint swelling, myalgias, neck pain and neck stiffness.   Skin: Negative.  Negative for color change, pallor, rash and wound.   Allergic/Immunologic: Negative.  Negative for environmental allergies, food allergies and immunocompromised state.   Neurological: Negative.  Negative for dizziness, tremors, seizures, syncope, facial asymmetry, speech difficulty, weakness, light-headedness, numbness and headaches.   Hematological: Negative.  Negative for adenopathy. Does not bruise/bleed easily.   Psychiatric/Behavioral: Negative for agitation, behavioral problems, confusion, decreased concentration, dysphoric mood, hallucinations, self-injury, sleep disturbance and suicidal ideas. The patient is not nervous/anxious and is not hyperactive.    All other systems reviewed and are negative.      Physical Exam:     Physical Exam   Constitutional: She appears well-developed and well-nourished.   HENT:   Head: Normocephalic and atraumatic.   Right Ear: External ear normal.   Left Ear: External ear normal.   Mouth/Throat: Oropharynx is clear and moist.   Eyes: Pupils are equal, round, and reactive to light. Conjunctivae are normal.   Cardiovascular: Normal rate, regular rhythm, normal heart sounds and intact distal pulses.    Pulmonary/Chest: Effort normal and breath sounds normal.   Abdominal: Soft. Bowel sounds are normal. She exhibits no distension and no mass. There is no tenderness. There is no rebound and no guarding.   Genitourinary: Vagina normal. No vaginal discharge found.   Musculoskeletal: Normal range of motion.   Neurological: She is alert. She has normal reflexes.   Skin: Skin is warm and dry.   Psychiatric: She has a normal mood and affect. Her behavior is normal. Judgment and thought content normal.       I have reviewed the following portions of the patient's history: allergies, current medications, past family history, past medical history, past social history, past surgical  history, problem list and ROS and confirm it's accurate.      Procedure:     Urethral dilation-after an appropriate informed consent the patient was brought to the procedure suite.  The urethra was gently anesthetized with 10 cc of 2% viscous Xylocaine jelly.  After an adequate period of topical anesthesia and dilated with [place] sounds from 16-28 Macanese sequentially without complication the patient was given gentamicin as prophylaxis with 80 mg          Assessment/Plan:   Urethral stricture-status post dilation     Patient's Body mass index is 22.14 kg/m². BMI is within normal parameters. No follow-up required.          This document has been electronically signed by VERENICE FINK MD November 9, 2018 11:01 AM

## 2018-12-03 ENCOUNTER — HOSPITAL ENCOUNTER (EMERGENCY)
Facility: HOSPITAL | Age: 43
Discharge: HOME | End: 2018-12-03
Attending: FAMILY MEDICINE | Admitting: FAMILY MEDICINE

## 2018-12-03 VITALS
OXYGEN SATURATION: 98 % | HEIGHT: 69 IN | BODY MASS INDEX: 21.48 KG/M2 | TEMPERATURE: 98.2 F | WEIGHT: 145 LBS | HEART RATE: 86 BPM | RESPIRATION RATE: 20 BRPM | DIASTOLIC BLOOD PRESSURE: 90 MMHG | SYSTOLIC BLOOD PRESSURE: 122 MMHG

## 2018-12-03 DIAGNOSIS — G43.901 MIGRAINE WITH STATUS MIGRAINOSUS, NOT INTRACTABLE, UNSPECIFIED MIGRAINE TYPE: Primary | ICD-10-CM

## 2018-12-03 PROCEDURE — 96375 TX/PRO/DX INJ NEW DRUG ADDON: CPT

## 2018-12-03 PROCEDURE — 96361 HYDRATE IV INFUSION ADD-ON: CPT

## 2018-12-03 PROCEDURE — 25010000002 DEXAMETHASONE PER 1 MG: Performed by: FAMILY MEDICINE

## 2018-12-03 PROCEDURE — 96374 THER/PROPH/DIAG INJ IV PUSH: CPT

## 2018-12-03 PROCEDURE — 99283 EMERGENCY DEPT VISIT LOW MDM: CPT

## 2018-12-03 PROCEDURE — 25010000002 BUTORPHANOL PER 1 MG: Performed by: FAMILY MEDICINE

## 2018-12-03 PROCEDURE — 25010000002 PROMETHAZINE PER 50 MG: Performed by: FAMILY MEDICINE

## 2018-12-03 RX ORDER — DEXAMETHASONE SODIUM PHOSPHATE 4 MG/ML
8 INJECTION, SOLUTION INTRA-ARTICULAR; INTRALESIONAL; INTRAMUSCULAR; INTRAVENOUS; SOFT TISSUE ONCE
Status: COMPLETED | OUTPATIENT
Start: 2018-12-03 | End: 2018-12-03

## 2018-12-03 RX ORDER — PROMETHAZINE HYDROCHLORIDE 25 MG/ML
12.5 INJECTION, SOLUTION INTRAMUSCULAR; INTRAVENOUS ONCE
Status: COMPLETED | OUTPATIENT
Start: 2018-12-03 | End: 2018-12-03

## 2018-12-03 RX ORDER — SODIUM CHLORIDE 0.9 % (FLUSH) 0.9 %
10 SYRINGE (ML) INJECTION AS NEEDED
Status: DISCONTINUED | OUTPATIENT
Start: 2018-12-03 | End: 2018-12-03 | Stop reason: HOSPADM

## 2018-12-03 RX ADMIN — BUTORPHANOL TARTRATE 2 MG: 2 INJECTION, SOLUTION INTRAMUSCULAR; INTRAVENOUS at 09:40

## 2018-12-03 RX ADMIN — SODIUM CHLORIDE 1000 ML: 9 INJECTION, SOLUTION INTRAVENOUS at 09:37

## 2018-12-03 RX ADMIN — SODIUM CHLORIDE, PRESERVATIVE FREE 300 UNITS: 5 INJECTION INTRAVENOUS at 10:31

## 2018-12-03 RX ADMIN — DEXAMETHASONE SODIUM PHOSPHATE 8 MG: 4 INJECTION, SOLUTION INTRA-ARTICULAR; INTRALESIONAL; INTRAMUSCULAR; INTRAVENOUS; SOFT TISSUE at 09:41

## 2018-12-03 RX ADMIN — PROMETHAZINE HYDROCHLORIDE 12.5 MG: 25 INJECTION INTRAMUSCULAR; INTRAVENOUS at 09:39

## 2018-12-03 NOTE — ED PROVIDER NOTES
Subjective   43-year-old white female presents secondary to right-sided throbbing headache.  She states is been present for approximately 5 days.  She states this feels very similar to her previous migraines.  No head injury.  No new neurologic change.  No fever.  She states that she's been doing very well recently and regarding her headaches.  She states that unfortunately tomorrow is the anniversary of her mother's death.  She has noticed that stress tends to bring her migraines on.  No other complaints at this time.            Review of Systems   Constitutional: Negative.  Negative for fever.   HENT: Negative.    Respiratory: Negative.    Cardiovascular: Negative.  Negative for chest pain.   Gastrointestinal: Negative.  Negative for abdominal pain.   Endocrine: Negative.    Genitourinary: Negative.  Negative for dysuria.   Skin: Negative.    Neurological: Negative.    Psychiatric/Behavioral: Negative.    All other systems reviewed and are negative.      Past Medical History:   Diagnosis Date   • Abdominal pain    • Abdominal swelling    • Hoyos's disease    • Bipolar 1 disorder (CMS/HCC)    • Brain tumor (benign) (CMS/HCC)    • Brain tumor (CMS/HCC)     R Frontal Lobe per pt   • Brain tumor (CMS/HCC) 2014   • Constipation    • DDD (degenerative disc disease), cervical 05/29/2017   • DDD (degenerative disc disease), cervical    • Diarrhea    • Fibromyalgia    • IBS (irritable bowel syndrome)    • Migraine    • Nausea & vomiting    • PONV (postoperative nausea and vomiting)    • PTSD (post-traumatic stress disorder)    • Rectal bleeding        Allergies   Allergen Reactions   • Ativan [Lorazepam] Hallucinations     confusion   • Sulfa Antibiotics Shortness Of Breath and Swelling   • Compazine [Prochlorperazine Edisylate] Hives   • Demerol [Meperidine] Hives   • Droperidol Itching   • Metoclopramide Swelling   • Toradol [Ketorolac Tromethamine] Hives and Itching   • Zofran [Ondansetron Hcl] Rash       Past Surgical  History:   Procedure Laterality Date   • APPENDECTOMY     • HYSTERECTOMY     • KNEE SURGERY     • LAPAROSCOPIC SALPINGOOPHERECTOMY     • SHOULDER SURGERY      3 times       Family History   Problem Relation Age of Onset   • Crohn's disease Other    • Hypertension Other    • Diabetes Other    • Irritable bowel syndrome Other        Social History     Socioeconomic History   • Marital status:      Spouse name: Not on file   • Number of children: Not on file   • Years of education: Not on file   • Highest education level: Not on file   Tobacco Use   • Smoking status: Former Smoker     Packs/day: 1.00     Years: 20.00     Pack years: 20.00     Last attempt to quit: 11/1/2015     Years since quitting: 3.0   • Smokeless tobacco: Never Used   Substance and Sexual Activity   • Alcohol use: No   • Drug use: Yes     Types: Marijuana     Comment: for pain   • Sexual activity: Defer     Birth control/protection: Surgical   Social History Narrative    Lives in [place], works as a , independent of ADL           Objective   Physical Exam   Constitutional: She is oriented to person, place, and time. She appears well-developed and well-nourished. No distress.   HENT:   Head: Normocephalic and atraumatic.   Right Ear: External ear normal.   Left Ear: External ear normal.   Nose: Nose normal.   Eyes: Conjunctivae and EOM are normal. Pupils are equal, round, and reactive to light.   Neck: Normal range of motion. Neck supple. No JVD present. No tracheal deviation present.   Cardiovascular: Normal rate, regular rhythm and normal heart sounds.   No murmur heard.  Pulmonary/Chest: Effort normal and breath sounds normal. No respiratory distress. She has no wheezes.   Abdominal: Soft. Bowel sounds are normal. There is no tenderness.   Musculoskeletal: Normal range of motion. She exhibits no edema or deformity.   Neurological: She is alert and oriented to person, place, and time. No cranial nerve deficit.   Skin: Skin is warm and  dry. No rash noted. She is not diaphoretic. No erythema. No pallor.   Psychiatric: She has a normal mood and affect. Her behavior is normal. Thought content normal.   Nursing note and vitals reviewed.      Procedures           ED Course  ED Course as of Dec 03 0915   Mon Dec 03, 2018   0913 D/w Dr Renae.  [JI]      ED Course User Index  [JI] Girish Morales PA                  MDM  Number of Diagnoses or Management Options  Migraine with status migrainosus, not intractable, unspecified migraine type: established and worsening     Amount and/or Complexity of Data Reviewed  Discuss the patient with other providers: yes    Risk of Complications, Morbidity, and/or Mortality  Presenting problems: moderate          Final diagnoses:   Migraine with status migrainosus, not intractable, unspecified migraine type            Girish Morales PA  12/03/18 0912

## 2018-12-05 ENCOUNTER — HOSPITAL ENCOUNTER (EMERGENCY)
Facility: HOSPITAL | Age: 43
Discharge: HOME | End: 2018-12-05
Attending: EMERGENCY MEDICINE | Admitting: EMERGENCY MEDICINE

## 2018-12-05 VITALS
HEIGHT: 69 IN | RESPIRATION RATE: 18 BRPM | OXYGEN SATURATION: 100 % | WEIGHT: 145 LBS | TEMPERATURE: 98 F | SYSTOLIC BLOOD PRESSURE: 113 MMHG | BODY MASS INDEX: 21.48 KG/M2 | HEART RATE: 113 BPM | DIASTOLIC BLOOD PRESSURE: 77 MMHG

## 2018-12-05 DIAGNOSIS — A49.9 URINARY TRACT INFECTION, BACTERIAL: Primary | ICD-10-CM

## 2018-12-05 DIAGNOSIS — G43.909 MIGRAINE SYNDROME: ICD-10-CM

## 2018-12-05 DIAGNOSIS — N39.0 URINARY TRACT INFECTION, BACTERIAL: Primary | ICD-10-CM

## 2018-12-05 LAB
6-ACETYL MORPHINE: NEGATIVE
ALBUMIN SERPL-MCNC: 4.3 G/DL (ref 3.5–5)
ALBUMIN/GLOB SERPL: 1.9 G/DL (ref 1.5–2.5)
ALP SERPL-CCNC: 60 U/L (ref 35–104)
ALT SERPL W P-5'-P-CCNC: 47 U/L (ref 10–36)
AMPHET+METHAMPHET UR QL: NEGATIVE
AMYLASE SERPL-CCNC: 73 U/L (ref 28–100)
ANION GAP SERPL CALCULATED.3IONS-SCNC: 9.4 MMOL/L (ref 3.6–11.2)
AST SERPL-CCNC: 37 U/L (ref 10–30)
BACTERIA UR QL AUTO: ABNORMAL /HPF
BARBITURATES UR QL SCN: NEGATIVE
BASOPHILS # BLD AUTO: 0.01 10*3/MM3 (ref 0–0.3)
BASOPHILS NFR BLD AUTO: 0.1 % (ref 0–2)
BENZODIAZ UR QL SCN: NEGATIVE
BILIRUB SERPL-MCNC: 0.3 MG/DL (ref 0.2–1.8)
BILIRUB UR QL STRIP: NEGATIVE
BUN BLD-MCNC: 7 MG/DL (ref 7–21)
BUN/CREAT SERPL: 13.2 (ref 7–25)
BUPRENORPHINE SERPL-MCNC: NEGATIVE NG/ML
CALCIUM SPEC-SCNC: 9.3 MG/DL (ref 7.7–10)
CANNABINOIDS SERPL QL: POSITIVE
CHLORIDE SERPL-SCNC: 111 MMOL/L (ref 99–112)
CLARITY UR: ABNORMAL
CO2 SERPL-SCNC: 23.6 MMOL/L (ref 24.3–31.9)
COCAINE UR QL: NEGATIVE
COLOR UR: YELLOW
CREAT BLD-MCNC: 0.53 MG/DL (ref 0.43–1.29)
DEPRECATED RDW RBC AUTO: 48.2 FL (ref 37–54)
EOSINOPHIL # BLD AUTO: 0.04 10*3/MM3 (ref 0–0.7)
EOSINOPHIL NFR BLD AUTO: 0.5 % (ref 0–5)
ERYTHROCYTE [DISTWIDTH] IN BLOOD BY AUTOMATED COUNT: 15.3 % (ref 11.5–14.5)
GFR SERPL CREATININE-BSD FRML MDRD: 126 ML/MIN/1.73
GLOBULIN UR ELPH-MCNC: 2.3 GM/DL
GLUCOSE BLD-MCNC: 91 MG/DL (ref 70–110)
GLUCOSE UR STRIP-MCNC: NEGATIVE MG/DL
HCT VFR BLD AUTO: 40.1 % (ref 37–47)
HGB BLD-MCNC: 13.1 G/DL (ref 12–16)
HGB UR QL STRIP.AUTO: ABNORMAL
HYALINE CASTS UR QL AUTO: ABNORMAL /LPF
IMM GRANULOCYTES # BLD: 0.01 10*3/MM3 (ref 0–0.03)
IMM GRANULOCYTES NFR BLD: 0.1 % (ref 0–0.5)
KETONES UR QL STRIP: ABNORMAL
LEUKOCYTE ESTERASE UR QL STRIP.AUTO: ABNORMAL
LIPASE SERPL-CCNC: 54 U/L (ref 13–60)
LYMPHOCYTES # BLD AUTO: 2.1 10*3/MM3 (ref 1–3)
LYMPHOCYTES NFR BLD AUTO: 28.8 % (ref 21–51)
MCH RBC QN AUTO: 28.2 PG (ref 27–33)
MCHC RBC AUTO-ENTMCNC: 32.7 G/DL (ref 33–37)
MCV RBC AUTO: 86.2 FL (ref 80–94)
METHADONE UR QL SCN: NEGATIVE
MONOCYTES # BLD AUTO: 0.64 10*3/MM3 (ref 0.1–0.9)
MONOCYTES NFR BLD AUTO: 8.8 % (ref 0–10)
NEUTROPHILS # BLD AUTO: 4.5 10*3/MM3 (ref 1.4–6.5)
NEUTROPHILS NFR BLD AUTO: 61.7 % (ref 30–70)
NITRITE UR QL STRIP: NEGATIVE
OPIATES UR QL: POSITIVE
OSMOLALITY SERPL CALC.SUM OF ELEC: 284.4 MOSM/KG (ref 273–305)
OXYCODONE UR QL SCN: NEGATIVE
PCP UR QL SCN: NEGATIVE
PH UR STRIP.AUTO: 5.5 [PH] (ref 5–8)
PLATELET # BLD AUTO: 124 10*3/MM3 (ref 130–400)
PMV BLD AUTO: 11.5 FL (ref 6–10)
POTASSIUM BLD-SCNC: 3.6 MMOL/L (ref 3.5–5.3)
PROT SERPL-MCNC: 6.6 G/DL (ref 6–8)
PROT UR QL STRIP: ABNORMAL
RBC # BLD AUTO: 4.65 10*6/MM3 (ref 4.2–5.4)
RBC # UR: ABNORMAL /HPF
REF LAB TEST METHOD: ABNORMAL
SODIUM BLD-SCNC: 144 MMOL/L (ref 135–153)
SP GR UR STRIP: 1.02 (ref 1–1.03)
SQUAMOUS #/AREA URNS HPF: ABNORMAL /HPF
UROBILINOGEN UR QL STRIP: ABNORMAL
VALPROATE SERPL-MCNC: <1 MCG/ML (ref 50–100)
WBC NRBC COR # BLD: 7.3 10*3/MM3 (ref 4.5–12.5)
WBC UR QL AUTO: ABNORMAL /HPF

## 2018-12-05 PROCEDURE — 80307 DRUG TEST PRSMV CHEM ANLYZR: CPT | Performed by: EMERGENCY MEDICINE

## 2018-12-05 PROCEDURE — 80164 ASSAY DIPROPYLACETIC ACD TOT: CPT | Performed by: EMERGENCY MEDICINE

## 2018-12-05 PROCEDURE — 87086 URINE CULTURE/COLONY COUNT: CPT | Performed by: EMERGENCY MEDICINE

## 2018-12-05 PROCEDURE — 96367 TX/PROPH/DG ADDL SEQ IV INF: CPT

## 2018-12-05 PROCEDURE — 25010000002 BUTORPHANOL PER 1 MG: Performed by: EMERGENCY MEDICINE

## 2018-12-05 PROCEDURE — 96365 THER/PROPH/DIAG IV INF INIT: CPT

## 2018-12-05 PROCEDURE — 99284 EMERGENCY DEPT VISIT MOD MDM: CPT

## 2018-12-05 PROCEDURE — 25010000002 DEXAMETHASONE PER 1 MG: Performed by: EMERGENCY MEDICINE

## 2018-12-05 PROCEDURE — 87077 CULTURE AEROBIC IDENTIFY: CPT | Performed by: EMERGENCY MEDICINE

## 2018-12-05 PROCEDURE — 81001 URINALYSIS AUTO W/SCOPE: CPT | Performed by: EMERGENCY MEDICINE

## 2018-12-05 PROCEDURE — 80053 COMPREHEN METABOLIC PANEL: CPT | Performed by: EMERGENCY MEDICINE

## 2018-12-05 PROCEDURE — 96375 TX/PRO/DX INJ NEW DRUG ADDON: CPT

## 2018-12-05 PROCEDURE — 87186 SC STD MICRODIL/AGAR DIL: CPT | Performed by: EMERGENCY MEDICINE

## 2018-12-05 PROCEDURE — 36415 COLL VENOUS BLD VENIPUNCTURE: CPT

## 2018-12-05 PROCEDURE — 25010000002 DIHYDROERGOTAMINE MESYLATE PER 1 MG: Performed by: EMERGENCY MEDICINE

## 2018-12-05 PROCEDURE — 83690 ASSAY OF LIPASE: CPT | Performed by: EMERGENCY MEDICINE

## 2018-12-05 PROCEDURE — 25010000002 PROMETHAZINE PER 50 MG: Performed by: EMERGENCY MEDICINE

## 2018-12-05 PROCEDURE — 85025 COMPLETE CBC W/AUTO DIFF WBC: CPT | Performed by: EMERGENCY MEDICINE

## 2018-12-05 PROCEDURE — 25010000002 CEFTRIAXONE: Performed by: EMERGENCY MEDICINE

## 2018-12-05 PROCEDURE — 82150 ASSAY OF AMYLASE: CPT | Performed by: EMERGENCY MEDICINE

## 2018-12-05 RX ORDER — SODIUM CHLORIDE 0.9 % (FLUSH) 0.9 %
10 SYRINGE (ML) INJECTION AS NEEDED
Status: DISCONTINUED | OUTPATIENT
Start: 2018-12-05 | End: 2018-12-05 | Stop reason: HOSPADM

## 2018-12-05 RX ORDER — DEXAMETHASONE SODIUM PHOSPHATE 4 MG/ML
8 INJECTION, SOLUTION INTRA-ARTICULAR; INTRALESIONAL; INTRAMUSCULAR; INTRAVENOUS; SOFT TISSUE ONCE
Status: COMPLETED | OUTPATIENT
Start: 2018-12-05 | End: 2018-12-05

## 2018-12-05 RX ORDER — HYDROCODONE BITARTRATE AND ACETAMINOPHEN 7.5; 325 MG/1; MG/1
1 TABLET ORAL ONCE
Status: COMPLETED | OUTPATIENT
Start: 2018-12-05 | End: 2018-12-05

## 2018-12-05 RX ORDER — DEXAMETHASONE SODIUM PHOSPHATE 4 MG/ML
4 INJECTION, SOLUTION INTRA-ARTICULAR; INTRALESIONAL; INTRAMUSCULAR; INTRAVENOUS; SOFT TISSUE ONCE
Status: DISCONTINUED | OUTPATIENT
Start: 2018-12-05 | End: 2018-12-05

## 2018-12-05 RX ORDER — DIHYDROERGOTAMINE MESYLATE 1 MG/ML
1 INJECTION, SOLUTION INTRAMUSCULAR; INTRAVENOUS; SUBCUTANEOUS ONCE
Status: COMPLETED | OUTPATIENT
Start: 2018-12-05 | End: 2018-12-05

## 2018-12-05 RX ORDER — CEFUROXIME AXETIL 500 MG/1
500 TABLET ORAL EVERY 12 HOURS
Qty: 18 TABLET | Refills: 0 | OUTPATIENT
Start: 2018-12-05 | End: 2019-01-11

## 2018-12-05 RX ORDER — PHENAZOPYRIDINE HYDROCHLORIDE 200 MG/1
200 TABLET, FILM COATED ORAL 3 TIMES DAILY PRN
Qty: 6 TABLET | Refills: 0 | OUTPATIENT
Start: 2018-12-05 | End: 2019-01-11

## 2018-12-05 RX ADMIN — SODIUM CHLORIDE 1000 ML: 9 INJECTION, SOLUTION INTRAVENOUS at 12:32

## 2018-12-05 RX ADMIN — PROMETHAZINE HYDROCHLORIDE 12.5 MG: 25 INJECTION INTRAMUSCULAR; INTRAVENOUS at 12:31

## 2018-12-05 RX ADMIN — DEXAMETHASONE SODIUM PHOSPHATE 8 MG: 4 INJECTION, SOLUTION INTRA-ARTICULAR; INTRALESIONAL; INTRAMUSCULAR; INTRAVENOUS; SOFT TISSUE at 12:31

## 2018-12-05 RX ADMIN — DIHYDROERGOTAMINE MESYLATE 1 MG: 1 INJECTION, SOLUTION INTRAMUSCULAR; INTRAVENOUS; SUBCUTANEOUS at 12:32

## 2018-12-05 RX ADMIN — BUTORPHANOL TARTRATE 1 MG: 2 INJECTION, SOLUTION INTRAMUSCULAR; INTRAVENOUS at 12:32

## 2018-12-05 RX ADMIN — HYDROCODONE BITARTRATE AND ACETAMINOPHEN 1 TABLET: 7.5; 325 TABLET ORAL at 15:21

## 2018-12-05 RX ADMIN — CEFTRIAXONE 1 G: 1 INJECTION, POWDER, FOR SOLUTION INTRAMUSCULAR; INTRAVENOUS at 14:03

## 2018-12-05 NOTE — ED PROVIDER NOTES
"Subjective   Patient is a 43-year-old white female who has a long history of chronic headaches, \"migraines\".  She states that her neurologist in [place] tells her that she has \"stress migraines\".  She presents today complaining that she has had a throbbing, pounding, diffuse right-sided headaches for the last 5 days.  For the last 2 days she has developed dysuria and hematuria, has had nausea, vomiting, left flank pain.  She denies fever, chills, neck stiffness, chest pain, shortness of breath, other symptoms or other complaints.  She states that she has been having a lot of emotional stress is last several days, as today is the first anniversary of her mother's death, and tomorrow is her  grandmother's birthday.  She states that both of these are waning very heavily on her and she feels that they have brought about her headache.  She states that she has a history of urethral stricture that she has to get dilated every 6 weeks, and that she feels like she may have a urinary tract infection.            Review of Systems   Constitutional: Negative for chills, diaphoresis and fever.   HENT: Negative for ear pain, sore throat and trouble swallowing.    Eyes: Negative for photophobia and pain.   Respiratory: Negative for shortness of breath, wheezing and stridor.    Cardiovascular: Negative for chest pain and palpitations.   Gastrointestinal: Positive for nausea and vomiting. Negative for abdominal distention, blood in stool and diarrhea.   Endocrine: Negative for polydipsia and polyphagia.   Genitourinary: Positive for hematuria. Negative for dysuria and flank pain.   Musculoskeletal: Negative for back pain and neck stiffness.   Skin: Negative for color change and pallor.   Neurological: Positive for headaches. Negative for seizures, syncope and speech difficulty.   Psychiatric/Behavioral: Negative for confusion.   All other systems reviewed and are negative.      Past Medical History:   Diagnosis Date   • " Abdominal pain    • Abdominal swelling    • Hoyos's disease    • Bipolar 1 disorder (CMS/HCC)    • Brain tumor (benign) (CMS/HCC)    • Brain tumor (CMS/HCC)     R Frontal Lobe per pt   • Brain tumor (CMS/HCC) 2014   • Constipation    • DDD (degenerative disc disease), cervical 05/29/2017   • DDD (degenerative disc disease), cervical    • Diarrhea    • Fibromyalgia    • IBS (irritable bowel syndrome)    • Migraine    • Nausea & vomiting    • PONV (postoperative nausea and vomiting)    • PTSD (post-traumatic stress disorder)    • Rectal bleeding        Allergies   Allergen Reactions   • Ativan [Lorazepam] Hallucinations     confusion   • Sulfa Antibiotics Shortness Of Breath and Swelling   • Compazine [Prochlorperazine Edisylate] Hives   • Demerol [Meperidine] Hives   • Droperidol Itching   • Metoclopramide Swelling   • Toradol [Ketorolac Tromethamine] Hives and Itching   • Zofran [Ondansetron Hcl] Rash       Past Surgical History:   Procedure Laterality Date   • APPENDECTOMY     • HYSTERECTOMY     • KNEE SURGERY     • LAPAROSCOPIC SALPINGOOPHERECTOMY     • SHOULDER SURGERY      3 times       Family History   Problem Relation Age of Onset   • Crohn's disease Other    • Hypertension Other    • Diabetes Other    • Irritable bowel syndrome Other        Social History     Socioeconomic History   • Marital status:      Spouse name: Not on file   • Number of children: Not on file   • Years of education: Not on file   • Highest education level: Not on file   Tobacco Use   • Smoking status: Former Smoker     Packs/day: 1.00     Years: 20.00     Pack years: 20.00     Last attempt to quit: 11/1/2015     Years since quitting: 3.0   • Smokeless tobacco: Never Used   Substance and Sexual Activity   • Alcohol use: No   • Drug use: Yes     Types: Marijuana     Comment: for pain   • Sexual activity: Defer     Birth control/protection: Surgical   Social History Narrative    Lives in [place], works as a , independent of  AD[employer]           Objective   Physical Exam   Constitutional: She is oriented to person, place, and time. She appears well-developed and well-nourished. No distress.   HENT:   Head: Normocephalic and atraumatic.   Eyes: EOM are normal. Pupils are equal, round, and reactive to light. No scleral icterus.   Neck: Normal range of motion. Neck supple. No neck rigidity. No tracheal deviation present.   There is some tenderness to palpation in the right posterior cervical paraspinous musculature.  There is no midline tenderness or deformity.  There is a full range of motion.  There are no meningeal signs.   Cardiovascular: Normal rate, regular rhythm and intact distal pulses.   Pulmonary/Chest: Effort normal and breath sounds normal. No respiratory distress. She exhibits no tenderness.   Abdominal: Soft. Bowel sounds are normal. There is tenderness (There is only mild suprapubic tenderness.  No other tenderness.  No acute peritoneal signs.). There is no rebound and no guarding.   There is no costovertebral angle tenderness.   Musculoskeletal: Normal range of motion. She exhibits no tenderness.   Neurological: She is alert and oriented to person, place, and time. She has normal strength. No sensory deficit. She exhibits normal muscle tone. Coordination normal. GCS eye subscore is 4. GCS verbal subscore is 5. GCS motor subscore is 6.   Skin: Skin is warm and dry. Capillary refill takes less than 2 seconds. She is not diaphoretic. No cyanosis. No pallor.   Psychiatric: She has a normal mood and affect. Her behavior is normal.   Nursing note and vitals reviewed.      Procedures  Results for orders placed or performed during the hospital encounter of 12/05/18   Comprehensive Metabolic Panel   Result Value Ref Range    Glucose 91 70 - 110 mg/dL    BUN 7 7 - 21 mg/dL    Creatinine 0.53 0.43 - 1.29 mg/dL    Sodium 144 135 - 153 mmol/L    Potassium 3.6 3.5 - 5.3 mmol/L    Chloride 111 99 - 112 mmol/L    CO2 23.6 (L) 24.3 - 31.9 mmol/L     Calcium 9.3 7.7 - 10.0 mg/dL    Total Protein 6.6 6.0 - 8.0 g/dL    Albumin 4.30 3.50 - 5.00 g/dL    ALT (SGPT) 47 (H) 10 - 36 U/L    AST (SGOT) 37 (H) 10 - 30 U/L    Alkaline Phosphatase 60 35 - 104 U/L    Total Bilirubin 0.3 0.2 - 1.8 mg/dL    eGFR Non African Amer 126 >60 mL/min/1.73    Globulin 2.3 gm/dL    A/G Ratio 1.9 1.5 - 2.5 g/dL    BUN/Creatinine Ratio 13.2 7.0 - 25.0    Anion Gap 9.4 3.6 - 11.2 mmol/L   Lipase   Result Value Ref Range    Lipase 54 13 - 60 U/L   Urinalysis With Microscopic If Indicated (No Culture) - Urine, Catheter   Result Value Ref Range    Color, UA Yellow Yellow, Straw    Appearance, UA Cloudy (A) Clear    pH, UA 5.5 5.0 - 8.0    Specific Gravity, UA 1.025 1.005 - 1.030    Glucose, UA Negative Negative    Ketones, UA Trace (A) Negative    Bilirubin, UA Negative Negative    Blood, UA Large (3+) (A) Negative    Protein, UA Trace (A) Negative    Leuk Esterase, UA Large (3+) (A) Negative    Nitrite, UA Negative Negative    Urobilinogen, UA 0.2 E.U./dL 0.2 - 1.0 E.U./dL   Urine Drug Screen - Urine, Catheter   Result Value Ref Range    Amphetamine Screen, Urine Negative Negative    Barbiturates Screen, Urine Negative Negative    Benzodiazepine Screen, Urine Negative Negative    Cocaine Screen, Urine Negative Negative    Methadone Screen, Urine Negative Negative    Opiate Screen Positive (A) Negative    Phencyclidine (PCP), Urine Negative Negative    THC, Screen, Urine Positive (A) Negative    6-ACETYL MORPHINE Negative Negative    Buprenorphine, Screen, Urine Negative Negative    Oxycodone Screen, Urine Negative Negative   Valproic Acid Level, Total   Result Value Ref Range    Valproic Acid <1.0 (L) 50.0 - 100.0 mcg/mL   CBC Auto Differential   Result Value Ref Range    WBC 7.30 4.50 - 12.50 10*3/mm3    RBC 4.65 4.20 - 5.40 10*6/mm3    Hemoglobin 13.1 12.0 - 16.0 g/dL    Hematocrit 40.1 37.0 - 47.0 %    MCV 86.2 80.0 - 94.0 fL    MCH 28.2 27.0 - 33.0 pg    MCHC 32.7 (L) 33.0 - 37.0 g/dL     RDW 15.3 (H) 11.5 - 14.5 %    RDW-SD 48.2 37.0 - 54.0 fl    MPV 11.5 (H) 6.0 - 10.0 fL    Platelets 124 (L) 130 - 400 10*3/mm3    Neutrophil % 61.7 30.0 - 70.0 %    Lymphocyte % 28.8 21.0 - 51.0 %    Monocyte % 8.8 0.0 - 10.0 %    Eosinophil % 0.5 0.0 - 5.0 %    Basophil % 0.1 0.0 - 2.0 %    Immature Grans % 0.1 0.0 - 0.5 %    Neutrophils, Absolute 4.50 1.40 - 6.50 10*3/mm3    Lymphocytes, Absolute 2.10 1.00 - 3.00 10*3/mm3    Monocytes, Absolute 0.64 0.10 - 0.90 10*3/mm3    Eosinophils, Absolute 0.04 0.00 - 0.70 10*3/mm3    Basophils, Absolute 0.01 0.00 - 0.30 10*3/mm3    Immature Grans, Absolute 0.01 0.00 - 0.03 10*3/mm3   Amylase   Result Value Ref Range    Amylase 73 28 - 100 U/L   Urinalysis, Microscopic Only - Urine, Catheter   Result Value Ref Range    RBC, UA 31-50 (A) None Seen, 0-2 /HPF    WBC, UA Too Numerous to Count (A) None Seen, 0-2 /HPF    Bacteria, UA None Seen None Seen /HPF    Squamous Epithelial Cells, UA None Seen None Seen, 0-2 /HPF    Hyaline Casts, UA 3-6 None Seen /LPF    Methodology Automated Microscopy    Osmolality, Calculated   Result Value Ref Range    Osmolality Calc 284.4 273.0 - 305.0 mOsm/kg                ED Course  Patient's emergency department stay has been uneventful.  She has been resting comfortably with her fluids and medications, voices that she feels much, much better and that she would like to go home.  We discussed her test results and her plan of care.  She voices understanding and agreement.                  MDM      Final diagnoses:   Urinary tract infection, bacterial   Migraine syndrome             Please note that portions of this note were completed with a voice recognition program. Efforts were made to edit the dictations, but occasionally words are mistranscribed.       Stevenson Ortiz MD  12/05/18 0425

## 2018-12-05 NOTE — DISCHARGE INSTRUCTIONS
Home in care of family.  Rest and drink plenty of fluids.  Take your medications as prescribed.  Follow-up with RICKY Patterson in the office in one to 2 days.  Return the emergency Department right away symptoms worsen/any problems.

## 2018-12-09 LAB — BACTERIA SPEC AEROBE CULT: ABNORMAL

## 2018-12-12 ENCOUNTER — HOSPITAL ENCOUNTER (EMERGENCY)
Facility: HOSPITAL | Age: 43
Discharge: HOME | End: 2018-12-12
Attending: EMERGENCY MEDICINE | Admitting: EMERGENCY MEDICINE

## 2018-12-12 VITALS
SYSTOLIC BLOOD PRESSURE: 142 MMHG | HEART RATE: 63 BPM | RESPIRATION RATE: 16 BRPM | TEMPERATURE: 97.8 F | BODY MASS INDEX: 21.48 KG/M2 | HEIGHT: 69 IN | DIASTOLIC BLOOD PRESSURE: 72 MMHG | WEIGHT: 145 LBS | OXYGEN SATURATION: 100 %

## 2018-12-12 DIAGNOSIS — G43.001 MIGRAINE WITHOUT AURA AND WITH STATUS MIGRAINOSUS, NOT INTRACTABLE: Primary | ICD-10-CM

## 2018-12-12 PROCEDURE — 96372 THER/PROPH/DIAG INJ SC/IM: CPT

## 2018-12-12 PROCEDURE — 96374 THER/PROPH/DIAG INJ IV PUSH: CPT

## 2018-12-12 PROCEDURE — 96375 TX/PRO/DX INJ NEW DRUG ADDON: CPT

## 2018-12-12 PROCEDURE — 25010000002 DIHYDROERGOTAMINE MESYLATE PER 1 MG: Performed by: PHYSICIAN ASSISTANT

## 2018-12-12 PROCEDURE — 25010000002 DEXAMETHASONE SODIUM PHOSPHATE 20 MG/5ML SOLUTION: Performed by: PHYSICIAN ASSISTANT

## 2018-12-12 PROCEDURE — 99283 EMERGENCY DEPT VISIT LOW MDM: CPT

## 2018-12-12 PROCEDURE — 25010000002 PROMETHAZINE PER 50 MG: Performed by: PHYSICIAN ASSISTANT

## 2018-12-12 RX ORDER — PROMETHAZINE HYDROCHLORIDE 25 MG/ML
12.5 INJECTION, SOLUTION INTRAMUSCULAR; INTRAVENOUS ONCE
Status: COMPLETED | OUTPATIENT
Start: 2018-12-12 | End: 2018-12-12

## 2018-12-12 RX ORDER — DIHYDROERGOTAMINE MESYLATE 1 MG/ML
0.5 INJECTION, SOLUTION INTRAMUSCULAR; INTRAVENOUS; SUBCUTANEOUS ONCE
Status: COMPLETED | OUTPATIENT
Start: 2018-12-12 | End: 2018-12-12

## 2018-12-12 RX ORDER — DEXAMETHASONE SODIUM PHOSPHATE 4 MG/ML
10 INJECTION, SOLUTION INTRA-ARTICULAR; INTRALESIONAL; INTRAMUSCULAR; INTRAVENOUS; SOFT TISSUE ONCE
Status: COMPLETED | OUTPATIENT
Start: 2018-12-12 | End: 2018-12-12

## 2018-12-12 RX ADMIN — DIHYDROERGOTAMINE MESYLATE 0.5 MG: 1 INJECTION, SOLUTION INTRAMUSCULAR; INTRAVENOUS; SUBCUTANEOUS at 13:53

## 2018-12-12 RX ADMIN — DEXAMETHASONE SODIUM PHOSPHATE 10 MG: 4 INJECTION, SOLUTION INTRAMUSCULAR; INTRAVENOUS at 13:50

## 2018-12-12 RX ADMIN — SODIUM CHLORIDE, PRESERVATIVE FREE 300 UNITS: 5 INJECTION INTRAVENOUS at 14:42

## 2018-12-12 RX ADMIN — PROMETHAZINE HYDROCHLORIDE 12.5 MG: 25 INJECTION INTRAMUSCULAR; INTRAVENOUS at 13:51

## 2018-12-12 NOTE — ED PROVIDER NOTES
Subjective      43-year-old white female presents secondary to right and left -sided throbbing headache.  She states is been present for approximately 3 days.  She states this feels very similar to her previous migraines.  No head injury.  No new neurologic change.  No fever no neck stiffness..  She states that she's been doing very well recently and regarding her headaches.  No other complaints.         History provided by:  Patient   used: No    Migraine   Pain location:  L temporal and R temporal  Radiates to:  Does not radiate  Severity at highest:  8/10  Onset quality:  Gradual  Duration:  2 days  Timing:  Intermittent  Progression:  Worsening  Chronicity:  New  Similar to prior headaches: no    Context: not activity, not caffeine, not defecating, not eating, not intercourse and not loud noise    Relieved by:  Nothing  Worsened by:  Nothing  Ineffective treatments:  None tried  Associated symptoms: no abdominal pain, no back pain, no blurred vision, no cough, no diarrhea, no facial pain, no neck pain, no neck stiffness, no paresthesias, no sore throat, no swollen glands, no syncope, no tingling and no URI    Risk factors: no anger, no family hx of SAH and does not have insomnia        Review of Systems   HENT: Negative for sore throat.    Eyes: Negative for blurred vision.   Respiratory: Negative for cough, choking and chest tightness.    Cardiovascular: Negative for chest pain, leg swelling and syncope.   Gastrointestinal: Negative for abdominal pain and diarrhea.   Musculoskeletal: Negative for back pain, neck pain and neck stiffness.   Skin: Positive for rash. Negative for color change and pallor.   Neurological: Positive for headaches. Negative for light-headedness and paresthesias.   Hematological: Negative for adenopathy. Does not bruise/bleed easily.   All other systems reviewed and are negative.      Past Medical History:   Diagnosis Date   • Abdominal pain    • Abdominal swelling     • Hoyos's disease    • Bipolar 1 disorder (CMS/HCC)    • Brain tumor (benign) (CMS/HCC)    • Brain tumor (CMS/HCC)     R Frontal Lobe per pt   • Brain tumor (CMS/HCC) 2014   • Constipation    • DDD (degenerative disc disease), cervical 05/29/2017   • DDD (degenerative disc disease), cervical    • Diarrhea    • Fibromyalgia    • IBS (irritable bowel syndrome)    • Migraine    • Nausea & vomiting    • PONV (postoperative nausea and vomiting)    • PTSD (post-traumatic stress disorder)    • Rectal bleeding        Allergies   Allergen Reactions   • Ativan [Lorazepam] Hallucinations     confusion   • Sulfa Antibiotics Shortness Of Breath and Swelling   • Compazine [Prochlorperazine Edisylate] Hives   • Demerol [Meperidine] Hives   • Droperidol Itching   • Metoclopramide Swelling   • Toradol [Ketorolac Tromethamine] Hives and Itching   • Zofran [Ondansetron Hcl] Rash       Past Surgical History:   Procedure Laterality Date   • APPENDECTOMY     • HYSTERECTOMY     • KNEE SURGERY     • LAPAROSCOPIC SALPINGOOPHERECTOMY     • SHOULDER SURGERY      3 times       Family History   Problem Relation Age of Onset   • Crohn's disease Other    • Hypertension Other    • Diabetes Other    • Irritable bowel syndrome Other        Social History     Socioeconomic History   • Marital status:      Spouse name: Not on file   • Number of children: Not on file   • Years of education: Not on file   • Highest education level: Not on file   Tobacco Use   • Smoking status: Former Smoker     Packs/day: 1.00     Years: 20.00     Pack years: 20.00     Last attempt to quit: 11/1/2015     Years since quitting: 3.1   • Smokeless tobacco: Never Used   Substance and Sexual Activity   • Alcohol use: No   • Drug use: Yes     Types: Marijuana     Comment: for pain   • Sexual activity: Defer     Birth control/protection: Surgical   Social History Narrative    Lives in [place], works as a , independent of AD[employer]           Objective   Physical Exam    Constitutional: She is oriented to person, place, and time. She appears well-developed and well-nourished.   HENT:   Head: Normocephalic and atraumatic.   Right Ear: External ear normal.   Left Ear: External ear normal.   Nose: Nose normal.   Mouth/Throat: Oropharynx is clear and moist.   Eyes: Conjunctivae and EOM are normal. Pupils are equal, round, and reactive to light. Right eye exhibits no discharge. Left eye exhibits no discharge. No scleral icterus.   Neck: Normal range of motion. Neck supple. No JVD present. No tracheal deviation present. No thyromegaly present.   Cardiovascular: Normal rate, regular rhythm, normal heart sounds and intact distal pulses. Exam reveals no friction rub.   No murmur heard.  Pulmonary/Chest: Effort normal and breath sounds normal. No stridor. No respiratory distress. She has no wheezes. She has no rales.   Abdominal: Soft. Normal appearance and bowel sounds are normal. She exhibits no distension. There is no tenderness. There is no rebound and no guarding.   Musculoskeletal: Normal range of motion.   Neurological: She is alert and oriented to person, place, and time. She displays normal reflexes. No cranial nerve deficit. She exhibits normal muscle tone. Coordination normal.   Skin: Skin is warm and dry. Capillary refill takes less than 2 seconds. No erythema. No pallor.   Psychiatric: She has a normal mood and affect. Her behavior is normal. Judgment and thought content normal.   Nursing note and vitals reviewed.      Procedures           ED Course  ED Course as of Dec 12 1412   Wed Dec 12, 2018   1411 Patient does say she feels better will be discharged.  [BH]      ED Course User Index  [BH] Mark Stubbs PA-C                  [facility]      Final diagnoses:   Migraine without aura and with status migrainosus, not intractable            Mark Stubbs PA-C  12/12/18 1412

## 2018-12-21 ENCOUNTER — OFFICE VISIT (OUTPATIENT)
Dept: UROLOGY | Facility: CLINIC | Age: 43
End: 2018-12-21

## 2018-12-21 VITALS — BODY MASS INDEX: 21.49 KG/M2 | WEIGHT: 145.06 LBS | HEIGHT: 69 IN

## 2018-12-21 DIAGNOSIS — N35.028 OTHER POST-TRAUMATIC URETHRAL STRICTURE, FEMALE: ICD-10-CM

## 2018-12-21 PROCEDURE — 53661 DILATION OF URETHRA: CPT | Performed by: UROLOGY

## 2018-12-21 RX ORDER — GENTAMICIN SULFATE 40 MG/ML
80 INJECTION, SOLUTION INTRAMUSCULAR; INTRAVENOUS ONCE
Status: DISCONTINUED | OUTPATIENT
Start: 2018-12-21 | End: 2019-01-11

## 2018-12-21 RX ORDER — OXYCODONE AND ACETAMINOPHEN 10; 325 MG/1; MG/1
1 TABLET ORAL EVERY 6 HOURS PRN
Qty: 8 TABLET | Refills: 0 | OUTPATIENT
Start: 2018-12-21 | End: 2019-01-11

## 2018-12-21 RX ORDER — OXYCODONE AND ACETAMINOPHEN 10; 325 MG/1; MG/1
1 TABLET ORAL EVERY 6 HOURS PRN
Qty: 8 TABLET | Refills: 0 | Status: SHIPPED | OUTPATIENT
Start: 2018-12-21 | End: 2018-12-21 | Stop reason: SDUPTHER

## 2018-12-21 NOTE — PROGRESS NOTES
Chief Complaint:          Chief Complaint   Patient presents with   • URETHRAL STENOSIS     DILATATION       HPI:   43 y.o. female.  43 year old white female. Accompanied by  with IgA nephropathy, hepatitis C, severe urethral stenosis who presents for urethral dilation.  She is desirous of more pain medication but I explained to her carefully as long as she is on hydrocodone and I will not give her any more pain medication.  And if she's not on it she can only have several pills around the time of her dilation.  I also explained to her there is no permanent fix of the situation other than self dilatation which she refuses to do.Today she wants to learn to do intermittent catheter.  She gets pain medication from her primary care provider and she will be given pain medication only for short supply after painful dilatation this is been discussed with her primary care physician he will have dilatation no more than every 6 weeks in this office.  She returns today for follow-up.  She is now doing dramatically better.  She is now catheterizing herself intermittently.  It's helped dramatically I went ahead after prep and drape in a sterile fashion and dilated her sequentially to 24 Citizen of [place] which was the max she can take.  There were no complications.  Overall, she's doing well, she is not gotten into see the nephrologist as yet for inexplicable reasons.  I reaffirmed the importance of a workup of her IgA nephropathy.  She is here for follow-up she wants to be dilated.  I went ahead and recommended this.  She was dilated without complication to 26 Citizen of [place].  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  He also complains of significant loss of libido and requests a  testosterone injection.  And a long discussion of the complications including clitorimegaly, hair growth, nipple hair growth etc. and she wishes to proceed.  She got a nice response from a testosterone and wants it repeated at a low dose she's here for dilation which was accomplished without complication  She returns today for dilation.  Additionally she felt that testosterone worked well but unfortunately we don't do it in the office anymore and she's going to see her primary care physician for a prescription for Premarin which she was on previously and worked dramatically well.  She's here for dilation and is done extremely well I went ahead and dilated to 28 [place].  She does well with the topical testosterone I gave her prescription with the understanding is not to be reimbursed she is comfortable with that and a follow-up upper based on that she is to call for any additional problems from my standpoint       Past Medical History:        Past Medical History:   Diagnosis Date   • Abdominal pain    • Abdominal swelling    • Hoyos's disease    • Bipolar 1 disorder (CMS/HCC)    • Brain tumor (benign) (CMS/HCC)    • Brain tumor (CMS/HCC)     R Frontal Lobe per pt   • Brain tumor (CMS/HCC) 2014   • Constipation    • DDD (degenerative disc disease), cervical 05/29/2017   • DDD (degenerative disc disease), cervical    • Diarrhea    • Fibromyalgia    • IBS (irritable bowel syndrome)    • Migraine    • Nausea & vomiting    • PONV (postoperative nausea and vomiting)    • PTSD (post-traumatic stress disorder)    • Rectal bleeding          Current Meds:     Current Outpatient Medications   Medication Sig Dispense Refill   • albuterol (PROVENTIL HFA;VENTOLIN HFA) 108 (90 Base) MCG/ACT inhaler Inhale 2 puffs 2 (Two) Times a Day.     • Azelastine HCl 137 MCG/SPRAY solution 1 spray into each nostril As Needed (Allergies).     • busPIRone (BUSPAR) 15 MG tablet Take 15 mg by mouth 3 (three) times a day        • butorphanol  (STADOL) 10 MG/ML nasal spray 1 spray into each nostril Daily.     • cefuroxime (CEFTIN) 500 MG tablet Take 1 tablet by mouth Every 12 (Twelve) Hours. Starting 12/6 18 tablet 0   • cephalexin (KEFLEX) 500 MG capsule Take 1 capsule by mouth 2 (Two) Times a Day. 14 capsule 0   • cetirizine (zyrTEC) 10 MG tablet Take 1 tablet by mouth Daily. 30 tablet 0   • dicyclomine (BENTYL) 10 MG capsule Take 10 mg by mouth 3 (Three) Times a Day.     • divalproex (DEPAKOTE) 500 MG DR tablet Take 1 tablet by mouth 3 (Three) Times a Day. 90 tablet 2   • fexofenadine (ALLEGRA ALLERGY) 180 MG tablet Take 1 tablet by mouth Daily. 30 tablet 0   • fluticasone (VERAMYST) 27.5 MCG/SPRAY nasal spray 2 sprays into each nostril Daily.     • guaiFENesin (MUCINEX) 600 MG 12 hr tablet Take 1 tablet by mouth Every 12 (Twelve) Hours. 28 tablet 0   • HYDROcodone-acetaminophen (NORCO) 7.5-325 MG per tablet Take 1 tablet by mouth 2 (Two) Times a Day As Needed for Moderate Pain . 6 tablet 0   • HYDROcodone-acetaminophen (NORCO) 7.5-325 MG per tablet Take 1 tablet by mouth Every 4 (Four) Hours As Needed for Moderate Pain . 12 tablet 0   • montelukast (SINGULAIR) 10 MG tablet Take 10 mg by mouth Every Night.     • oxyCODONE-acetaminophen (PERCOCET)  MG per tablet Take 1 tablet by mouth Every 6 (Six) Hours As Needed for Moderate Pain . 8 tablet 0   • pantoprazole (PROTONIX) 40 MG EC tablet Take 40 mg by mouth 2 (Two) Times a Day As Needed.     • phenazopyridine (PYRIDIUM) 200 MG tablet Take 1 tablet by mouth 3 (Three) Times a Day As Needed for bladder spasms (painful urination). 6 tablet 0   • promethazine (PHENERGAN) 12.5 MG tablet Take 1 tablet by mouth Every 6 (Six) Hours As Needed for Nausea or Vomiting. 20 tablet 0   • promethazine (PHENERGAN) 25 MG suppository Insert 1 suppository into the rectum Every 6 (Six) Hours As Needed for Nausea or Vomiting. 12 suppository 0   • sertraline (ZOLOFT) 100 MG tablet Take 100 mg by mouth 2 (Two) Times a  Day.     • SUMAtriptan (IMITREX) 6 MG/0.5ML solution injection Inject 6 mg under the skin 1 (One) Time.     • tamsulosin (FLOMAX) 0.4 MG capsule 24 hr capsule Take 1 capsule by mouth Every Night. 30 capsule 0   • testosterone (TESTIM) 50 MG/5GM (1%) gel gel Place 25 mg on the skin as directed by provider Every Other Day. 5 g 2     No current facility-administered medications for this visit.         Allergies:      Allergies   Allergen Reactions   • Ativan [Lorazepam] Hallucinations     confusion   • Sulfa Antibiotics Shortness Of Breath and Swelling   • Compazine [Prochlorperazine Edisylate] Hives   • Demerol [Meperidine] Hives   • Droperidol Itching   • Metoclopramide Swelling   • Toradol [Ketorolac Tromethamine] Hives and Itching   • Zofran [Ondansetron Hcl] Rash        Past Surgical History:     Past Surgical History:   Procedure Laterality Date   • ANAL SCOPE N/A 7/28/2016    Procedure: ANAL SCOPE;  Surgeon: Kael Lopez MD;  Location: [facility] OR;  Service:    • APPENDECTOMY     • COLONOSCOPY N/A 6/30/2016    Procedure: COLONOSCOPY  CPTCODE:84657;  Surgeon: Jose Antonio Belle III, MD;  Location: [facility] OR;  Service:    • COLONOSCOPY N/A 7/7/2016    Procedure: COLONOSCOPY (98515) CPT;  Surgeon: Jose Antonio Belle III, MD;  Location: [facility] OR;  Service:    • CYSTOSCOPY RETROGRADE PYELOGRAM Bilateral 4/28/2017    Procedure: CYSTOSCOPY RETROGRADE PYELOGRAM;  Surgeon: Mu Blunt MD;  Location: [facility] OR;  Service:    • ENDOSCOPY N/A 6/30/2016    Procedure: ESOPHAGOGASTRODUODENOSCOPY WITH BIOPSY  CPTCODE:45812;  Surgeon: Jose Antonio Belle III, MD;  Location: [facility] OR;  Service:    • HEMORRHOIDECTOMY N/A 7/28/2016    Procedure: HEMORRHOID STAPLING;  Surgeon: Kael Lopez MD;  Location: [facility] OR;  Service:    • HYSTERECTOMY     • KNEE SURGERY     • LAPAROSCOPIC SALPINGOOPHERECTOMY     • PORTACATH PLACEMENT N/A 7/28/2017    Procedure: INSERTION OF PORTACATH;  Surgeon: Celso Arredondo MD;   Location: [facility];  Service:    • SHOULDER SURGERY      3 times         Social History:     Social History     Socioeconomic History   • Marital status:      Spouse name: Not on file   • Number of children: Not on file   • Years of education: Not on file   • Highest education level: Not on file   Social Needs   • Financial resource strain: Not on file   • Food insecurity - worry: Not on file   • Food insecurity - inability: Not on file   • Transportation needs - medical: Not on file   • Transportation needs - non-medical: Not on file   Occupational History   • Not on file   Tobacco Use   • Smoking status: Former Smoker     Packs/day: 1.00     Years: 20.00     Pack years: 20.00     Last attempt to quit: 11/1/2015     Years since quitting: 3.1   • Smokeless tobacco: Never Used   Substance and Sexual Activity   • Alcohol use: No   • Drug use: Yes     Types: Marijuana     Comment: for pain   • Sexual activity: Defer     Birth control/protection: Surgical   Other Topics Concern   • Not on file   Social History Narrative    Lives in [place], works as a , independent of ADL       Family History:     Family History   Problem Relation Age of Onset   • Crohn's disease Other    • Hypertension Other    • Diabetes Other    • Irritable bowel syndrome Other        Review of Systems:     Review of Systems   Constitutional: Negative.  Negative for activity change, appetite change, chills, diaphoresis, fatigue and unexpected weight change.   HENT: Negative for congestion, dental problem, drooling, ear discharge, ear pain, facial swelling, hearing loss, mouth sores, nosebleeds, postnasal drip, rhinorrhea, sinus pressure, sneezing, sore throat, tinnitus, trouble swallowing and voice change.    Eyes: Negative.  Negative for photophobia, pain, discharge, redness, itching and visual disturbance.   Respiratory: Negative.  Negative for apnea, cough, choking, chest tightness, shortness of breath, wheezing and stridor.     Cardiovascular: Negative.  Negative for chest pain, palpitations and leg swelling.   Gastrointestinal: Negative.  Negative for abdominal distention, abdominal pain, anal bleeding, blood in stool, constipation, diarrhea, nausea, rectal pain and vomiting.   Endocrine: Negative.  Negative for cold intolerance, heat intolerance, polydipsia, polyphagia and polyuria.   Genitourinary: Positive for difficulty urinating.   Musculoskeletal: Negative.  Negative for arthralgias, back pain, gait problem, joint swelling, myalgias, neck pain and neck stiffness.   Skin: Negative.  Negative for color change, pallor, rash and wound.   Allergic/Immunologic: Negative.  Negative for environmental allergies, food allergies and immunocompromised state.   Neurological: Negative.  Negative for dizziness, tremors, seizures, syncope, facial asymmetry, speech difficulty, weakness, light-headedness, numbness and headaches.   Hematological: Negative.  Negative for adenopathy. Does not bruise/bleed easily.   Psychiatric/Behavioral: Negative for agitation, behavioral problems, confusion, decreased concentration, dysphoric mood, hallucinations, self-injury, sleep disturbance and suicidal ideas. The patient is not nervous/anxious and is not hyperactive.    All other systems reviewed and are negative.      Physical Exam:     Physical Exam   Constitutional: She appears well-developed and well-nourished.   HENT:   Head: Normocephalic and atraumatic.   Right Ear: External ear normal.   Left Ear: External ear normal.   Mouth/Throat: Oropharynx is clear and moist.   Eyes: Conjunctivae are normal. Pupils are equal, round, and reactive to light.   Cardiovascular: Normal rate, regular rhythm, normal heart sounds and intact distal pulses.   Pulmonary/Chest: Effort normal and breath sounds normal.   Abdominal: Soft. Bowel sounds are normal. She exhibits no distension and no mass. There is no tenderness. There is no rebound and no guarding.   Genitourinary:  Vagina normal. No vaginal discharge found.   Musculoskeletal: Normal range of motion.   Neurological: She is alert. She has normal reflexes.   Skin: Skin is warm and dry.   Psychiatric: She has a normal mood and affect. Her behavior is normal. Judgment and thought content normal.       I have reviewed the following portions of the patient's history: allergies, current medications, past family history, past medical history, past social history, past surgical history, problem list and ROS and confirm it's accurate.      Procedure:   Urethral dilation-after an appropriate informed consent the patient was brought to the procedure suite.  The urethra was gently anesthetized with 10 cc of 2% viscous Xylocaine jelly.  After an adequate period of topical anesthesia and dilated with [place] sounds from 16-28 K[place] sequentially without complication the patient was given gentamicin as prophylaxis with 80 mg    Assessment/Plan:   Urethral stricture post infection-status post unremarkable dilation     Patient's Body mass index is 21.41 kg/m². BMI is within normal parameters. No follow-up required.          This document has been electronically signed by VERENICE FINK MD December 21, 2018 11:03 AM

## 2019-01-10 ENCOUNTER — HOSPITAL ENCOUNTER (EMERGENCY)
Facility: HOSPITAL | Age: 44
Discharge: HOME | End: 2019-01-11
Attending: EMERGENCY MEDICINE | Admitting: EMERGENCY MEDICINE

## 2019-01-10 VITALS
BODY MASS INDEX: 21.03 KG/M2 | DIASTOLIC BLOOD PRESSURE: 69 MMHG | OXYGEN SATURATION: 96 % | SYSTOLIC BLOOD PRESSURE: 110 MMHG | WEIGHT: 142 LBS | RESPIRATION RATE: 18 BRPM | TEMPERATURE: 97.7 F | HEART RATE: 77 BPM | HEIGHT: 69 IN

## 2019-01-10 DIAGNOSIS — Z86.59 HISTORY OF BIPOLAR DISORDER: ICD-10-CM

## 2019-01-10 DIAGNOSIS — R11.2 NON-INTRACTABLE VOMITING WITH NAUSEA, UNSPECIFIED VOMITING TYPE: ICD-10-CM

## 2019-01-10 DIAGNOSIS — H53.149 PHOTOPHOBIA: ICD-10-CM

## 2019-01-10 DIAGNOSIS — G43.009 MIGRAINE WITHOUT AURA AND WITHOUT STATUS MIGRAINOSUS, NOT INTRACTABLE: Primary | ICD-10-CM

## 2019-01-10 PROCEDURE — 96361 HYDRATE IV INFUSION ADD-ON: CPT

## 2019-01-10 PROCEDURE — 96375 TX/PRO/DX INJ NEW DRUG ADDON: CPT

## 2019-01-10 PROCEDURE — 99283 EMERGENCY DEPT VISIT LOW MDM: CPT

## 2019-01-10 PROCEDURE — 96374 THER/PROPH/DIAG INJ IV PUSH: CPT

## 2019-01-11 PROCEDURE — 25010000002 DIHYDROERGOTAMINE MESYLATE PER 1 MG: Performed by: EMERGENCY MEDICINE

## 2019-01-11 PROCEDURE — 25010000002 DEXAMETHASONE PER 1 MG: Performed by: EMERGENCY MEDICINE

## 2019-01-11 PROCEDURE — 25010000002 BUTORPHANOL PER 1 MG: Performed by: EMERGENCY MEDICINE

## 2019-01-11 PROCEDURE — 25010000002 PROMETHAZINE PER 50 MG: Performed by: EMERGENCY MEDICINE

## 2019-01-11 RX ORDER — BUTORPHANOL TARTRATE 1 MG/ML
2 INJECTION, SOLUTION INTRAMUSCULAR; INTRAVENOUS EVERY 4 HOURS PRN
Status: DISCONTINUED | OUTPATIENT
Start: 2019-01-11 | End: 2019-01-11 | Stop reason: HOSPADM

## 2019-01-11 RX ORDER — PROMETHAZINE HYDROCHLORIDE 25 MG/ML
12.5 INJECTION, SOLUTION INTRAMUSCULAR; INTRAVENOUS ONCE
Status: COMPLETED | OUTPATIENT
Start: 2019-01-11 | End: 2019-01-11

## 2019-01-11 RX ORDER — DIHYDROERGOTAMINE MESYLATE 1 MG/ML
1 INJECTION, SOLUTION INTRAMUSCULAR; INTRAVENOUS; SUBCUTANEOUS ONCE
Status: COMPLETED | OUTPATIENT
Start: 2019-01-11 | End: 2019-01-11

## 2019-01-11 RX ADMIN — PROMETHAZINE HYDROCHLORIDE 12.5 MG: 25 INJECTION INTRAMUSCULAR; INTRAVENOUS at 01:13

## 2019-01-11 RX ADMIN — DEXAMETHASONE SODIUM PHOSPHATE 8 MG: 4 INJECTION, SOLUTION INTRAMUSCULAR; INTRAVENOUS at 02:27

## 2019-01-11 RX ADMIN — SODIUM CHLORIDE 1000 ML: 9 INJECTION, SOLUTION INTRAVENOUS at 01:13

## 2019-01-11 RX ADMIN — BUTORPHANOL TARTRATE 2 MG: 1 INJECTION, SOLUTION INTRAMUSCULAR; INTRAVENOUS at 01:14

## 2019-01-11 RX ADMIN — DIHYDROERGOTAMINE MESYLATE 1 MG: 1 INJECTION, SOLUTION INTRAMUSCULAR; INTRAVENOUS; SUBCUTANEOUS at 01:13

## 2019-01-11 NOTE — DISCHARGE INSTRUCTIONS
Of migraine headache was improved with IV fluids and medications.  Patient needs to continue with all other current medical management and routine daily migraine medications.  She needs to complete course of amoxicillin that was recently prescribed for sinus infection.  Recommend her to give Dr. Key, her neurologist, call to discuss recent migraine and recommendations.  Increase fluid intake.  Return if any worsening symptoms.

## 2019-01-11 NOTE — ED PROVIDER NOTES
"Hui Camilo is a 43 y.o. female who presents to the emergency department with complaints of a migraine headache that started 8 days ago that has increased since onset. Pt has long-standing history of migraines, and the current symptoms are typical of her usual migraines.  The patient states that her headache is mainly right sided and she is describing it as, \"beating with a hammer\". The patient has vomited 3-4 times in the ED. She mentions that she can usually take Depakote, Imitrex, and Phenergan but those did not seem to help. The patient has a PMHx of migraines and sees Dr. Key who usually gives the patient DHE, Decadron, Stadol, and Phenergan that usually helps. She has gotten Botox in the past, but she recently lost her insurance and has not gotten it lately. She denies any blurred vision, eye pain, facial pain, neck pain, and any other acute symptoms at this time. The patient also states that she is on Amoxil for recent sinus infection.        History provided by:  Patient  Headache   Pain location:  R temporal  Quality:  Sharp  Radiates to:  Does not radiate  Onset quality:  Sudden  Duration:  1 week  Timing:  Constant  Progression:  Unchanged  Relieved by:  Nothing  Worsened by:  Light  Ineffective treatments:  DHE and prescription medications  Associated symptoms: nausea, photophobia and vomiting    Associated symptoms: no blurred vision, no dizziness, no eye pain, no facial pain and no neck pain        Review of Systems   Eyes: Positive for photophobia. Negative for blurred vision, pain and visual disturbance.   Gastrointestinal: Positive for nausea and vomiting.   Musculoskeletal: Negative for neck pain.   Neurological: Positive for headaches. Negative for dizziness and syncope.   All other systems reviewed and are negative.      Past Medical History:   Diagnosis Date   • Abdominal pain    • Abdominal swelling    • Hoyos's disease    • Bipolar 1 disorder (CMS/HCC)    • Brain tumor " (benign) (CMS/HCC)    • Brain tumor (CMS/HCC)     R Frontal Lobe per pt   • Brain tumor (CMS/HCC) 2014   • Constipation    • DDD (degenerative disc disease), cervical 05/29/2017   • DDD (degenerative disc disease), cervical    • Diarrhea    • Fibromyalgia    • IBS (irritable bowel syndrome)    • Migraine    • Nausea & vomiting    • PONV (postoperative nausea and vomiting)    • PTSD (post-traumatic stress disorder)    • Rectal bleeding        Allergies   Allergen Reactions   • Ativan [Lorazepam] Hallucinations     confusion   • Sulfa Antibiotics Shortness Of Breath and Swelling   • Compazine [Prochlorperazine Edisylate] Hives   • Demerol [Meperidine] Hives   • Droperidol Itching   • Metoclopramide Swelling   • Toradol [Ketorolac Tromethamine] Hives and Itching   • Zofran [Ondansetron Hcl] Rash       Past Surgical History:   Procedure Laterality Date   • ANAL SCOPE N/A 7/28/2016    Procedure: ANAL SCOPE;  Surgeon: Kael Lopez MD;  Location: [facility] OR;  Service:    • APPENDECTOMY     • COLONOSCOPY N/A 6/30/2016    Procedure: COLONOSCOPY  CPTCODE:53648;  Surgeon: Jose Antonio Belle III, MD;  Location: [facility] OR;  Service:    • COLONOSCOPY N/A 7/7/2016    Procedure: COLONOSCOPY (98877) CPT;  Surgeon: Jose Antonio Belle III, MD;  Location: [facility] OR;  Service:    • CYSTOSCOPY RETROGRADE PYELOGRAM Bilateral 4/28/2017    Procedure: CYSTOSCOPY RETROGRADE PYELOGRAM;  Surgeon: Mu Blunt MD;  Location: [facility] OR;  Service:    • ENDOSCOPY N/A 6/30/2016    Procedure: ESOPHAGOGASTRODUODENOSCOPY WITH BIOPSY  CPTCODE:16415;  Surgeon: Jose Antonio Belle III, MD;  Location: [facility] OR;  Service:    • HEMORRHOIDECTOMY N/A 7/28/2016    Procedure: HEMORRHOID STAPLING;  Surgeon: Kael Lopez MD;  Location: [facility] OR;  Service:    • HYSTERECTOMY     • KNEE SURGERY     • LAPAROSCOPIC SALPINGOOPHERECTOMY     • PORTACATH PLACEMENT N/A 7/28/2017    Procedure: INSERTION OF PORTACATH;  Surgeon: Celso Arredondo MD;   Location: [facility];  Service:    • SHOULDER SURGERY      3 times       Family History   Problem Relation Age of Onset   • Crohn's disease Other    • Hypertension Other    • Diabetes Other    • Irritable bowel syndrome Other        Social History     Socioeconomic History   • Marital status:      Spouse name: Not on file   • Number of children: Not on file   • Years of education: Not on file   • Highest education level: Not on file   Tobacco Use   • Smoking status: Former Smoker     Packs/day: 1.00     Years: 20.00     Pack years: 20.00     Last attempt to quit: 11/1/2015     Years since quitting: 3.1   • Smokeless tobacco: Never Used   Substance and Sexual Activity   • Alcohol use: No   • Drug use: Yes     Types: Marijuana     Comment: for pain   • Sexual activity: Defer     Birth control/protection: Surgical   Social History Narrative    Lives in [place], works as a , independent of ADL         Objective   Physical Exam   Constitutional: She is oriented to person, place, and time. She appears well-developed and well-nourished.   The patient appears uncomfortable secondary to pain.    HENT:   Head: Normocephalic and atraumatic.   The patient has mild nasal congestion but does not exhibit frontal, nasal, or maxillary tenderness.    Eyes: Conjunctivae and EOM are normal. Pupils are equal, round, and reactive to light. No scleral icterus. Right eye exhibits no nystagmus. Left eye exhibits no nystagmus.   Neck: Normal range of motion. Neck supple. No Brudzinski's sign and no Kernig's sign noted.   The patient has no neck tenderness.    Cardiovascular: Normal rate, regular rhythm and normal heart sounds.   Pulmonary/Chest: Effort normal and breath sounds normal. No respiratory distress. She has no wheezes. She has no rales.   Abdominal: Soft. There is no tenderness. There is no guarding.   Musculoskeletal: Normal range of motion.   Neurological: She is alert and oriented to person, place, and time.   Skin:  "Skin is warm and dry.   Psychiatric: She has a normal mood and affect. Her behavior is normal.   Nursing note and vitals reviewed.      Procedures         ED Course  ED Course as of Jan 11 0154 Fri Jan 11, 2019   0150 Reevaluate patient.  She is resting comfortably and says the headache is significantly improved.  She requests to be discharged home.  Vital signs are stable.  Recommended her to call Dr. Key in the morning for close follow-up.  Return if worsening symptoms  [FC]      ED Course User Index  [FC] Charu Pérez PA-C     No results found for this or any previous visit (from the past 24 hour(s)).  Note: In addition to lab results from this visit, the labs listed above may include labs taken at another facility or during a different encounter within the last 24 hours. Please correlate lab times with ED admission and discharge times for further clarification of the services performed during this visit.    No orders to display     Vitals:    01/10/19 1955 01/10/19 2300   BP: 114/74 110/69   BP Location: Left arm    Patient Position: Sitting    Pulse: 68 77   Resp: 18 18   Temp: 97.7 °F (36.5 °C)    TempSrc: Oral    SpO2: 100% 96%   Weight: 64.4 kg (142 lb)    Height: 175.3 cm (69\")      Medications   sodium chloride 0.9 % bolus 1,000 mL (1,000 mL Intravenous New Bag 1/11/19 0113)   dexamethasone (DECADRON) 8 mg in Sodium chloride 0.9 % IVPB (not administered)   butorphanol (STADOL) injection 2 mg (2 mg Intravenous Given 1/11/19 0114)   promethazine (PHENERGAN) injection 12.5 mg (12.5 mg Intravenous Given 1/11/19 0113)   dihydroergotamine (DHE) injection 1 mg (1 mg Intravenous Given 1/11/19 0113)     ECG/EMG Results (last 24 hours)     ** No results found for the last 24 hours. **                        [facility]    Final diagnoses:   Migraine without aura and without status migrainosus, not intractable   Photophobia   Non-intractable vomiting with nausea, unspecified vomiting type   History of bipolar " disorder       Documentation assistance provided by asad Landry.  Information recorded by the scribe was done at my direction and has been verified and validated by me.     Zenaida Landry  01/11/19 0042       Charu Pérez PA-C  01/11/19 0154

## 2019-04-29 ENCOUNTER — APPOINTMENT (OUTPATIENT)
Dept: GENERAL RADIOLOGY | Facility: HOSPITAL | Age: 44
End: 2019-04-29

## 2019-04-29 ENCOUNTER — HOSPITAL ENCOUNTER (EMERGENCY)
Facility: HOSPITAL | Age: 44
Discharge: HOME | End: 2019-04-29
Attending: EMERGENCY MEDICINE | Admitting: EMERGENCY MEDICINE

## 2019-04-29 VITALS
HEART RATE: 75 BPM | SYSTOLIC BLOOD PRESSURE: 124 MMHG | OXYGEN SATURATION: 100 % | WEIGHT: 130 LBS | TEMPERATURE: 97.7 F | RESPIRATION RATE: 18 BRPM | HEIGHT: 69 IN | DIASTOLIC BLOOD PRESSURE: 63 MMHG | BODY MASS INDEX: 19.26 KG/M2
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
## 2019-04-29 DIAGNOSIS — R07.81 RIB PAIN ON LEFT SIDE: Primary | ICD-10-CM

## 2019-04-29 LAB
6-ACETYL MORPHINE: NEGATIVE
AMPHET+METHAMPHET UR QL: NEGATIVE
BARBITURATES UR QL SCN: NEGATIVE
BENZODIAZ UR QL SCN: NEGATIVE
BILIRUB UR QL STRIP: NEGATIVE
BUPRENORPHINE SERPL-MCNC: NEGATIVE NG/ML
CANNABINOIDS SERPL QL: POSITIVE
CLARITY UR: ABNORMAL
COCAINE UR QL: NEGATIVE
COLOR UR: YELLOW
GLUCOSE UR STRIP-MCNC: NEGATIVE MG/DL
HGB UR QL STRIP.AUTO: NEGATIVE
KETONES UR QL STRIP: NEGATIVE
LEUKOCYTE ESTERASE UR QL STRIP.AUTO: NEGATIVE
METHADONE UR QL SCN: NEGATIVE
NITRITE UR QL STRIP: NEGATIVE
OPIATES UR QL: POSITIVE
OXYCODONE UR QL SCN: NEGATIVE
PCP UR QL SCN: NEGATIVE
PH UR STRIP.AUTO: 7.5 [PH] (ref 5–8)
PROT UR QL STRIP: NEGATIVE
SP GR UR STRIP: 1.01 (ref 1–1.03)
UROBILINOGEN UR QL STRIP: ABNORMAL

## 2019-04-29 PROCEDURE — 25010000002 METHYLPREDNISOLONE PER 125 MG: Performed by: EMERGENCY MEDICINE

## 2019-04-29 PROCEDURE — 80307 DRUG TEST PRSMV CHEM ANLYZR: CPT | Performed by: PHYSICIAN ASSISTANT

## 2019-04-29 PROCEDURE — 25010000002 HYDROMORPHONE 1 MG/ML SOLUTION: Performed by: EMERGENCY MEDICINE

## 2019-04-29 PROCEDURE — 71101 X-RAY EXAM UNILAT RIBS/CHEST: CPT | Performed by: RADIOLOGY

## 2019-04-29 PROCEDURE — 96372 THER/PROPH/DIAG INJ SC/IM: CPT

## 2019-04-29 PROCEDURE — 71101 X-RAY EXAM UNILAT RIBS/CHEST: CPT

## 2019-04-29 PROCEDURE — 99284 EMERGENCY DEPT VISIT MOD MDM: CPT

## 2019-04-29 PROCEDURE — 25010000002 PROMETHAZINE PER 50 MG: Performed by: PHYSICIAN ASSISTANT

## 2019-04-29 PROCEDURE — 81003 URINALYSIS AUTO W/O SCOPE: CPT | Performed by: PHYSICIAN ASSISTANT

## 2019-04-29 RX ORDER — PROMETHAZINE HYDROCHLORIDE 12.5 MG/1
12.5 TABLET ORAL EVERY 6 HOURS PRN
Qty: 15 TABLET | Refills: 0 | OUTPATIENT
Start: 2019-04-29 | End: 2020-02-09 | Stop reason: HOSPADM

## 2019-04-29 RX ORDER — PROMETHAZINE HYDROCHLORIDE 25 MG/ML
25 INJECTION, SOLUTION INTRAMUSCULAR; INTRAVENOUS ONCE
Status: COMPLETED | OUTPATIENT
Start: 2019-04-29 | End: 2019-04-29

## 2019-04-29 RX ORDER — ORPHENADRINE CITRATE 100 MG/1
100 TABLET, EXTENDED RELEASE ORAL 2 TIMES DAILY
Qty: 20 TABLET | Refills: 0 | OUTPATIENT
Start: 2019-04-29 | End: 2021-06-20

## 2019-04-29 RX ORDER — METHYLPREDNISOLONE SODIUM SUCCINATE 125 MG/2ML
125 INJECTION, POWDER, LYOPHILIZED, FOR SOLUTION INTRAMUSCULAR; INTRAVENOUS ONCE
Status: COMPLETED | OUTPATIENT
Start: 2019-04-29 | End: 2019-04-29

## 2019-04-29 RX ADMIN — HYDROMORPHONE HYDROCHLORIDE 1 MG: 1 INJECTION, SOLUTION INTRAMUSCULAR; INTRAVENOUS; SUBCUTANEOUS at 12:09

## 2019-04-29 RX ADMIN — PROMETHAZINE HYDROCHLORIDE 25 MG: 25 INJECTION INTRAMUSCULAR; INTRAVENOUS at 13:24

## 2019-04-29 RX ADMIN — METHYLPREDNISOLONE SODIUM SUCCINATE 125 MG: 125 INJECTION, POWDER, FOR SOLUTION INTRAMUSCULAR; INTRAVENOUS at 12:10

## 2019-05-26 ENCOUNTER — HOSPITAL ENCOUNTER (EMERGENCY)
Facility: HOSPITAL | Age: 44
Discharge: HOME | End: 2019-05-26
Attending: EMERGENCY MEDICINE | Admitting: EMERGENCY MEDICINE

## 2019-05-26 VITALS
DIASTOLIC BLOOD PRESSURE: 69 MMHG | RESPIRATION RATE: 20 BRPM | HEIGHT: 68 IN | TEMPERATURE: 98 F | WEIGHT: 165 LBS | HEART RATE: 79 BPM | SYSTOLIC BLOOD PRESSURE: 129 MMHG | OXYGEN SATURATION: 95 % | BODY MASS INDEX: 25.01 KG/M2

## 2019-05-26 VITALS
RESPIRATION RATE: 20 BRPM | DIASTOLIC BLOOD PRESSURE: 74 MMHG | HEIGHT: 68 IN | HEART RATE: 71 BPM | BODY MASS INDEX: 19.7 KG/M2 | SYSTOLIC BLOOD PRESSURE: 113 MMHG | WEIGHT: 130 LBS | TEMPERATURE: 98 F | OXYGEN SATURATION: 100 %

## 2019-05-26 DIAGNOSIS — R51.9 NONINTRACTABLE HEADACHE, UNSPECIFIED CHRONICITY PATTERN, UNSPECIFIED HEADACHE TYPE: Primary | ICD-10-CM

## 2019-05-26 PROCEDURE — 25010000002 BUTORPHANOL PER 1 MG: Performed by: EMERGENCY MEDICINE

## 2019-05-26 PROCEDURE — 96374 THER/PROPH/DIAG INJ IV PUSH: CPT

## 2019-05-26 PROCEDURE — 96375 TX/PRO/DX INJ NEW DRUG ADDON: CPT

## 2019-05-26 PROCEDURE — 96365 THER/PROPH/DIAG IV INF INIT: CPT

## 2019-05-26 PROCEDURE — 25010000002 DIHYDROERGOTAMINE MESYLATE PER 1 MG: Performed by: NURSE PRACTITIONER

## 2019-05-26 PROCEDURE — 25010000002 DEXAMETHASONE PER 1 MG: Performed by: NURSE PRACTITIONER

## 2019-05-26 PROCEDURE — 25010000002 DIPHENHYDRAMINE PER 50 MG: Performed by: EMERGENCY MEDICINE

## 2019-05-26 PROCEDURE — 25010000002 DEXAMETHASONE PER 1 MG: Performed by: EMERGENCY MEDICINE

## 2019-05-26 PROCEDURE — 99283 EMERGENCY DEPT VISIT LOW MDM: CPT

## 2019-05-26 PROCEDURE — 25010000002 PROMETHAZINE PER 50 MG: Performed by: NURSE PRACTITIONER

## 2019-05-26 RX ORDER — DEXAMETHASONE SODIUM PHOSPHATE 4 MG/ML
8 INJECTION, SOLUTION INTRA-ARTICULAR; INTRALESIONAL; INTRAMUSCULAR; INTRAVENOUS; SOFT TISSUE ONCE
Status: COMPLETED | OUTPATIENT
Start: 2019-05-26 | End: 2019-05-26

## 2019-05-26 RX ORDER — SODIUM CHLORIDE 0.9 % (FLUSH) 0.9 %
10 SYRINGE (ML) INJECTION AS NEEDED
Status: DISCONTINUED | OUTPATIENT
Start: 2019-05-26 | End: 2019-05-26 | Stop reason: HOSPADM

## 2019-05-26 RX ORDER — DIPHENHYDRAMINE HYDROCHLORIDE 50 MG/ML
25 INJECTION INTRAMUSCULAR; INTRAVENOUS ONCE
Status: COMPLETED | OUTPATIENT
Start: 2019-05-26 | End: 2019-05-26

## 2019-05-26 RX ORDER — DIHYDROERGOTAMINE MESYLATE 1 MG/ML
1 INJECTION, SOLUTION INTRAMUSCULAR; INTRAVENOUS; SUBCUTANEOUS ONCE
Status: COMPLETED | OUTPATIENT
Start: 2019-05-26 | End: 2019-05-26

## 2019-05-26 RX ORDER — SODIUM CHLORIDE 9 MG/ML
125 INJECTION, SOLUTION INTRAVENOUS CONTINUOUS
Status: DISCONTINUED | OUTPATIENT
Start: 2019-05-26 | End: 2019-05-26 | Stop reason: HOSPADM

## 2019-05-26 RX ORDER — PROMETHAZINE HYDROCHLORIDE 25 MG/ML
12.5 INJECTION, SOLUTION INTRAMUSCULAR; INTRAVENOUS ONCE
Status: COMPLETED | OUTPATIENT
Start: 2019-05-26 | End: 2019-05-26

## 2019-05-26 RX ORDER — SODIUM CHLORIDE 0.9 % (FLUSH) 0.9 %
10 SYRINGE (ML) INJECTION AS NEEDED
Status: DISCONTINUED | OUTPATIENT
Start: 2019-05-26 | End: 2019-05-27 | Stop reason: HOSPADM

## 2019-05-26 RX ADMIN — PROMETHAZINE HYDROCHLORIDE 12.5 MG: 25 INJECTION INTRAMUSCULAR; INTRAVENOUS at 21:00

## 2019-05-26 RX ADMIN — DEXAMETHASONE SODIUM PHOSPHATE 10 MG: 4 INJECTION, SOLUTION INTRAMUSCULAR; INTRAVENOUS at 06:02

## 2019-05-26 RX ADMIN — DEXAMETHASONE SODIUM PHOSPHATE 8 MG: 4 INJECTION, SOLUTION INTRAMUSCULAR; INTRAVENOUS at 21:04

## 2019-05-26 RX ADMIN — SODIUM CHLORIDE 1000 ML: 9 INJECTION, SOLUTION INTRAVENOUS at 21:06

## 2019-05-26 RX ADMIN — BUTORPHANOL TARTRATE 1 MG: 2 INJECTION, SOLUTION INTRAMUSCULAR; INTRAVENOUS at 06:01

## 2019-05-26 RX ADMIN — SODIUM CHLORIDE 125 ML/HR: 9 INJECTION, SOLUTION INTRAVENOUS at 06:01

## 2019-05-26 RX ADMIN — DIHYDROERGOTAMINE MESYLATE 1 MG: 1 INJECTION, SOLUTION INTRAMUSCULAR; INTRAVENOUS; SUBCUTANEOUS at 21:08

## 2019-05-26 RX ADMIN — SODIUM CHLORIDE 1000 ML: 9 INJECTION, SOLUTION INTRAVENOUS at 06:00

## 2019-05-26 RX ADMIN — SODIUM CHLORIDE, PRESERVATIVE FREE 300 UNITS: 5 INJECTION INTRAVENOUS at 22:52

## 2019-05-26 RX ADMIN — DIPHENHYDRAMINE HYDROCHLORIDE 25 MG: 50 INJECTION INTRAMUSCULAR; INTRAVENOUS at 06:01

## 2019-09-18 ENCOUNTER — HOSPITAL ENCOUNTER (EMERGENCY)
Facility: HOSPITAL | Age: 44
Discharge: LEFT BEFORE DISCHARGE | End: 2019-09-18

## 2020-01-14 ENCOUNTER — HOSPITAL ENCOUNTER (EMERGENCY)
Facility: HOSPITAL | Age: 45
Discharge: HOME | End: 2020-01-14
Attending: EMERGENCY MEDICINE | Admitting: EMERGENCY MEDICINE

## 2020-01-14 VITALS
RESPIRATION RATE: 16 BRPM | TEMPERATURE: 98 F | WEIGHT: 115 LBS | HEIGHT: 69 IN | SYSTOLIC BLOOD PRESSURE: 107 MMHG | BODY MASS INDEX: 17.03 KG/M2 | OXYGEN SATURATION: 99 % | HEART RATE: 96 BPM | DIASTOLIC BLOOD PRESSURE: 53 MMHG

## 2020-01-14 DIAGNOSIS — G43.009 MIGRAINE WITHOUT AURA AND WITHOUT STATUS MIGRAINOSUS, NOT INTRACTABLE: Primary | ICD-10-CM

## 2020-01-14 PROCEDURE — 25010000002 BUTORPHANOL PER 1 MG: Performed by: EMERGENCY MEDICINE

## 2020-01-14 PROCEDURE — 25010000002 PROMETHAZINE PER 50 MG: Performed by: EMERGENCY MEDICINE

## 2020-01-14 PROCEDURE — 99283 EMERGENCY DEPT VISIT LOW MDM: CPT

## 2020-01-14 PROCEDURE — 96372 THER/PROPH/DIAG INJ SC/IM: CPT

## 2020-01-14 RX ORDER — PROMETHAZINE HYDROCHLORIDE 25 MG/ML
12.5 INJECTION, SOLUTION INTRAMUSCULAR; INTRAVENOUS ONCE
Status: DISCONTINUED | OUTPATIENT
Start: 2020-01-14 | End: 2020-01-14

## 2020-01-14 RX ORDER — PROMETHAZINE HYDROCHLORIDE 25 MG/ML
12.5 INJECTION, SOLUTION INTRAMUSCULAR; INTRAVENOUS ONCE
Status: COMPLETED | OUTPATIENT
Start: 2020-01-14 | End: 2020-01-14

## 2020-01-14 RX ADMIN — BUTORPHANOL TARTRATE 2 MG: 2 INJECTION, SOLUTION INTRAMUSCULAR; INTRAVENOUS at 16:58

## 2020-01-14 RX ADMIN — PROMETHAZINE HYDROCHLORIDE 12.5 MG: 25 INJECTION, SOLUTION INTRAMUSCULAR; INTRAVENOUS at 16:58

## 2020-01-14 NOTE — ED PROVIDER NOTES
Subjective   Patient presents to ER with headache. She has a history of migraines, and this feels similar.      Headache   Pain location:  Frontal  Quality:  Sharp  Severity currently:  7/10  Severity at highest:  7/10  Onset quality:  Gradual  Chronicity:  Recurrent  Similar to prior headaches: yes    Context: activity, bright light and loud noise    Associated symptoms: photophobia        Review of Systems   Constitutional: Positive for activity change.   Eyes: Positive for photophobia.   Respiratory: Negative.    Cardiovascular: Negative.    Gastrointestinal: Negative.    Endocrine: Negative.    Musculoskeletal: Negative.    Skin: Negative.    Allergic/Immunologic: Negative.    Neurological: Positive for headaches.       Past Medical History:   Diagnosis Date   • Abdominal pain    • Abdominal swelling    • Hoyos's disease    • Bipolar 1 disorder (CMS/HCC)    • Brain tumor (benign) (CMS/HCC)    • Brain tumor (CMS/HCC)     R Frontal Lobe per pt   • Brain tumor (CMS/HCC) 2014   • Constipation    • DDD (degenerative disc disease), cervical 05/29/2017   • DDD (degenerative disc disease), cervical    • Diarrhea    • Fibromyalgia    • IBS (irritable bowel syndrome)    • Migraine    • Nausea & vomiting    • PONV (postoperative nausea and vomiting)    • PTSD (post-traumatic stress disorder)    • Rectal bleeding        Allergies   Allergen Reactions   • Ativan [Lorazepam] Hallucinations     confusion   • Sulfa Antibiotics Shortness Of Breath and Swelling   • Compazine [Prochlorperazine Edisylate] Hives   • Demerol [Meperidine] Hives   • Droperidol Itching   • Metoclopramide Swelling   • Toradol [Ketorolac Tromethamine] Hives and Itching   • Zofran [Ondansetron Hcl] Rash       Past Surgical History:   Procedure Laterality Date   • ANAL SCOPE N/A 7/28/2016    Procedure: ANAL SCOPE;  Surgeon: Kael Lopez MD;  Location: [facility];  Service:    • APPENDECTOMY     • COLONOSCOPY N/A 6/30/2016    Procedure: COLONOSCOPY   CPTCODE:23662;  Surgeon: Jose Antonio Belle III, MD;  Location: [facility] OR;  Service:    • COLONOSCOPY N/A 2016    Procedure: COLONOSCOPY (37054) CPT;  Surgeon: Jose Antonio Belle III, MD;  Location: [facility] OR;  Service:    • CYSTOSCOPY RETROGRADE PYELOGRAM Bilateral 2017    Procedure: CYSTOSCOPY RETROGRADE PYELOGRAM;  Surgeon: Mu Blunt MD;  Location: [facility] OR;  Service:    • ENDOSCOPY N/A 2016    Procedure: ESOPHAGOGASTRODUODENOSCOPY WITH BIOPSY  CPTCODE:17245;  Surgeon: Jose Antonio Belle III, MD;  Location: [facility] OR;  Service:    • HEMORRHOIDECTOMY N/A 2016    Procedure: HEMORRHOID STAPLING;  Surgeon: Kael Lopez MD;  Location: [facility] OR;  Service:    • HYSTERECTOMY     • KNEE SURGERY     • LAPAROSCOPIC SALPINGOOPHERECTOMY     • PORTACATH PLACEMENT N/A 2017    Procedure: INSERTION OF PORTACATH;  Surgeon: Celso Arredondo MD;  Location: [facility] OR;  Service:    • SHOULDER SURGERY      3 times       Family History   Problem Relation Age of Onset   • Crohn's disease Other    • Hypertension Other    • Diabetes Other    • Irritable bowel syndrome Other        Social History     Socioeconomic History   • Marital status:      Spouse name: Not on file   • Number of children: Not on file   • Years of education: Not on file   • Highest education level: Not on file   Tobacco Use   • Smoking status: Former Smoker     Packs/day: 1.00     Years: 20.00     Pack years: 20.00     Last attempt to quit: 2015     Years since quittin.2   • Smokeless tobacco: Never Used   Substance and Sexual Activity   • Alcohol use: No   • Drug use: Yes     Types: Marijuana     Comment: for pain   • Sexual activity: Defer     Birth control/protection: Surgical   Social History Narrative    Lives in [place], works as a , independent of ADL           Objective   Physical Exam   Constitutional: She appears well-developed and well-nourished.   HENT:   Head: Normocephalic.   Eyes:  Pupils are equal, round, and reactive to light. EOM are normal.   Neck: Normal range of motion.   Cardiovascular: Normal rate and regular rhythm.   Pulmonary/Chest: Effort normal.   Abdominal: Soft.   Musculoskeletal: Normal range of motion.   Neurological: She is alert.   Skin: Skin is warm.   Psychiatric: She has a normal mood and affect.   Nursing note and vitals reviewed.      Procedures           ED Course                                               MDM    Final diagnoses:   Migraine without aura and without status migrainosus, not intractable            Mitesh Garza MD  01/14/20 5092

## 2020-02-09 ENCOUNTER — HOSPITAL ENCOUNTER (EMERGENCY)
Facility: HOSPITAL | Age: 45
Discharge: HOME | End: 2020-02-09
Attending: EMERGENCY MEDICINE | Admitting: EMERGENCY MEDICINE

## 2020-02-09 VITALS
RESPIRATION RATE: 18 BRPM | HEIGHT: 69 IN | DIASTOLIC BLOOD PRESSURE: 84 MMHG | HEART RATE: 84 BPM | WEIGHT: 121 LBS | BODY MASS INDEX: 17.92 KG/M2 | SYSTOLIC BLOOD PRESSURE: 134 MMHG | TEMPERATURE: 98.2 F | OXYGEN SATURATION: 100 %

## 2020-02-09 DIAGNOSIS — S32.020D COMPRESSION FRACTURE OF L2 LUMBAR VERTEBRA, WITH ROUTINE HEALING, SUBSEQUENT ENCOUNTER: Primary | ICD-10-CM

## 2020-02-09 PROCEDURE — 63710000001 PROMETHAZINE PER 25 MG: Performed by: NURSE PRACTITIONER

## 2020-02-09 PROCEDURE — 99283 EMERGENCY DEPT VISIT LOW MDM: CPT

## 2020-02-09 RX ORDER — OXYCODONE HYDROCHLORIDE AND ACETAMINOPHEN 5; 325 MG/1; MG/1
1 TABLET ORAL ONCE
Status: COMPLETED | OUTPATIENT
Start: 2020-02-09 | End: 2020-02-09

## 2020-02-09 RX ORDER — OXYCODONE HYDROCHLORIDE AND ACETAMINOPHEN 5; 325 MG/1; MG/1
1 TABLET ORAL EVERY 4 HOURS PRN
Qty: 12 TABLET | Refills: 0 | OUTPATIENT
Start: 2020-02-09 | End: 2020-03-03

## 2020-02-09 RX ORDER — PROMETHAZINE HYDROCHLORIDE 25 MG/1
25 TABLET ORAL EVERY 6 HOURS PRN
Qty: 12 TABLET | Refills: 0 | Status: SHIPPED | OUTPATIENT
Start: 2020-02-09 | End: 2020-03-03 | Stop reason: SDUPTHER

## 2020-02-09 RX ORDER — PROMETHAZINE HYDROCHLORIDE 25 MG/1
25 TABLET ORAL ONCE
Status: COMPLETED | OUTPATIENT
Start: 2020-02-09 | End: 2020-02-09

## 2020-02-09 RX ADMIN — PROMETHAZINE HYDROCHLORIDE 25 MG: 25 TABLET ORAL at 15:00

## 2020-02-09 RX ADMIN — OXYCODONE HYDROCHLORIDE AND ACETAMINOPHEN 1 TABLET: 5; 325 TABLET ORAL at 15:00

## 2020-02-09 NOTE — ED NOTES
Patient discharged from ED at this time. Patient verbalized understanding of discharge instructions. Patient verbalized understanding of medication and follow up care. No needs or concerns voiced at this time. VSS. NADA.       El Talbert, RN  02/09/20 2296

## 2020-02-09 NOTE — ED PROVIDER NOTES
Subjective     Fall   Mechanism of injury: fall    Injury location:  Torso  Torso injury location:  Back  Incident location:  Home  Time since incident:  5 days  Arrived directly from scene: no    Fall:     Fall occurred:  Down stairs    Height of fall:  5 steps    Impact surface:  Stairs    Point of impact:  Back    Entrapped after fall: no    Suspicion of alcohol use: no    Suspicion of drug use: no    Tetanus status:  Unknown  Prior to arrival data:     Bystander interventions:  None    Patient ambulatory at scene: yes      Blood loss:  None    Responsiveness at scene:  Alert    Orientation at scene:  Person, place, situation and time    Loss of consciousness: no      Amnesic to event: no      Airway interventions:  None    Breathing interventions:  None    IV access status:  None    IO access:  None    Fluids administered:  None    Cardiac interventions:  None    Medications administered:  None  Associated symptoms: back pain    Associated symptoms: no abdominal pain, no chest pain and no difficulty breathing        Review of Systems   Constitutional: Negative.  Negative for fever.   HENT: Negative.    Respiratory: Negative.    Cardiovascular: Negative.  Negative for chest pain.   Gastrointestinal: Negative.  Negative for abdominal pain.   Endocrine: Negative.    Genitourinary: Negative.  Negative for dysuria.   Musculoskeletal: Positive for back pain.   Skin: Negative.    Neurological: Negative.    Psychiatric/Behavioral: Negative.    All other systems reviewed and are negative.      Past Medical History:   Diagnosis Date   • Abdominal pain    • Abdominal swelling    • Hoyos's disease    • Bipolar 1 disorder (CMS/HCC)    • Brain tumor (benign) (CMS/HCC)    • Brain tumor (CMS/HCC)     R Frontal Lobe per pt   • Brain tumor (CMS/HCC) 2014   • Constipation    • DDD (degenerative disc disease), cervical 05/29/2017   • DDD (degenerative disc disease), cervical    • Diarrhea    • Fibromyalgia    • IBS (irritable  bowel syndrome)    • Migraine    • Nausea & vomiting    • PONV (postoperative nausea and vomiting)    • PTSD (post-traumatic stress disorder)    • Rectal bleeding        Allergies   Allergen Reactions   • Ativan [Lorazepam] Hallucinations     confusion   • Sulfa Antibiotics Shortness Of Breath and Swelling   • Compazine [Prochlorperazine Edisylate] Hives   • Demerol [Meperidine] Hives   • Droperidol Itching   • Metoclopramide Swelling   • Toradol [Ketorolac Tromethamine] Hives and Itching   • Zofran [Ondansetron Hcl] Rash       Past Surgical History:   Procedure Laterality Date   • ANAL SCOPE N/A 7/28/2016    Procedure: ANAL SCOPE;  Surgeon: Kael Lopez MD;  Location: [facility] OR;  Service:    • APPENDECTOMY     • COLONOSCOPY N/A 6/30/2016    Procedure: COLONOSCOPY  CPTCODE:44961;  Surgeon: Jose Antonio Belle III, MD;  Location: [facility] OR;  Service:    • COLONOSCOPY N/A 7/7/2016    Procedure: COLONOSCOPY (03782) CPT;  Surgeon: Jose Antonio Belle III, MD;  Location: [facility] OR;  Service:    • CYSTOSCOPY RETROGRADE PYELOGRAM Bilateral 4/28/2017    Procedure: CYSTOSCOPY RETROGRADE PYELOGRAM;  Surgeon: Mu Blunt MD;  Location: [facility] OR;  Service:    • ENDOSCOPY N/A 6/30/2016    Procedure: ESOPHAGOGASTRODUODENOSCOPY WITH BIOPSY  CPTCODE:48303;  Surgeon: Jose Antonio Belle III, MD;  Location: [facility] OR;  Service:    • HEMORRHOIDECTOMY N/A 7/28/2016    Procedure: HEMORRHOID STAPLING;  Surgeon: Kael Lopez MD;  Location: [facility] OR;  Service:    • HYSTERECTOMY     • KNEE SURGERY     • LAPAROSCOPIC SALPINGOOPHERECTOMY     • PORTACATH PLACEMENT N/A 7/28/2017    Procedure: INSERTION OF PORTACATH;  Surgeon: Celso Arredondo MD;  Location: [facility] OR;  Service:    • SHOULDER SURGERY      3 times       Family History   Problem Relation Age of Onset   • Crohn's disease Other    • Hypertension Other    • Diabetes Other    • Irritable bowel syndrome Other        Social History     Socioeconomic History   •  Marital status:      Spouse name: Not on file   • Number of children: Not on file   • Years of education: Not on file   • Highest education level: Not on file   Tobacco Use   • Smoking status: Former Smoker     Packs/day: 1.00     Years: 20.00     Pack years: 20.00     Last attempt to quit: 2015     Years since quittin.2   • Smokeless tobacco: Never Used   Substance and Sexual Activity   • Alcohol use: No   • Drug use: Yes     Types: Marijuana     Comment: for pain   • Sexual activity: Defer     Birth control/protection: Surgical   Social History Narrative    Lives in [place], works as a , independent of ADL           Objective   Physical Exam   Constitutional: She is oriented to person, place, and time. She appears well-developed and well-nourished. No distress.   HENT:   Head: Normocephalic and atraumatic.   Right Ear: External ear normal.   Left Ear: External ear normal.   Nose: Nose normal.   Eyes: Pupils are equal, round, and reactive to light. Conjunctivae and EOM are normal.   Neck: Normal range of motion. Neck supple. No JVD present. No tracheal deviation present.   Cardiovascular: Normal rate, regular rhythm and normal heart sounds.   No murmur heard.  Pulmonary/Chest: Effort normal and breath sounds normal. No respiratory distress. She has no wheezes.   Abdominal: Soft. Bowel sounds are normal. There is no tenderness.   Musculoskeletal: Normal range of motion. She exhibits no edema or deformity.        Lumbar back: She exhibits tenderness.   Neurological: She is alert and oriented to person, place, and time. No cranial nerve deficit.   Skin: Skin is warm and dry. No rash noted. She is not diaphoretic. No erythema. No pallor.   Psychiatric: She has a normal mood and affect. Her behavior is normal. Thought content normal.   Nursing note and vitals reviewed.      Procedures           ED Course  ED Course as of  1541   Sun 2020   1532 Reviewed records from [facility]  on 02/05/2020 CT Lumbar spine impression:  Acute superior endplate compression fracture of L2 without significant loss of height.     [RUTH]   1536 Reviewed rosalie #[id number]    [RUTH]   1536 Patient reports that she has an appointment with Dr. Dubin tomorrow.  She was written percocet 5mg at [facility] when she was initially seen and this controlled her pain.  She just needs something to help control her pain until her appointment with Dubin.  Discussed with the patient that a percocet prescription will be written this time for this acute injury only.  From this visit forward she will need to seek pain control from her orthopedic/neurosurgeon or PCP.  Patient verbalizes understanding.     [RUTH]   1540 Patient was educated to wear the back brace provided at [facility] ER until evaluated by Dr. Dubin.     [RUTH]      ED Course User Index  [RUTH] Jennifer Gonzalez, APRN                                               MDM  Number of Diagnoses or Management Options  Compression fracture of L2 lumbar vertebra, with routine healing, subsequent encounter: new and requires workup  Risk of Complications, Morbidity, and/or Mortality  Presenting problems: low  Diagnostic procedures: low  Management options: low        Final diagnoses:   Compression fracture of L2 lumbar vertebra, with routine healing, subsequent encounter            Jennifer Gonzalez, APRN  02/09/20 1541

## 2020-02-28 ENCOUNTER — HOSPITAL ENCOUNTER (EMERGENCY)
Facility: HOSPITAL | Age: 45
Discharge: HOME | End: 2020-02-28
Attending: EMERGENCY MEDICINE | Admitting: EMERGENCY MEDICINE

## 2020-02-28 ENCOUNTER — APPOINTMENT (OUTPATIENT)
Dept: GENERAL RADIOLOGY | Facility: HOSPITAL | Age: 45
End: 2020-02-28

## 2020-02-28 VITALS
OXYGEN SATURATION: 97 % | BODY MASS INDEX: 17.92 KG/M2 | SYSTOLIC BLOOD PRESSURE: 139 MMHG | HEART RATE: 81 BPM | TEMPERATURE: 97.8 F | RESPIRATION RATE: 18 BRPM | DIASTOLIC BLOOD PRESSURE: 98 MMHG | WEIGHT: 121 LBS | HEIGHT: 69 IN

## 2020-02-28 DIAGNOSIS — M54.40 LOW BACK PAIN WITH SCIATICA, SCIATICA LATERALITY UNSPECIFIED, UNSPECIFIED BACK PAIN LATERALITY, UNSPECIFIED CHRONICITY: Primary | ICD-10-CM

## 2020-02-28 PROCEDURE — 99283 EMERGENCY DEPT VISIT LOW MDM: CPT

## 2020-02-28 PROCEDURE — 72110 X-RAY EXAM L-2 SPINE 4/>VWS: CPT | Performed by: RADIOLOGY

## 2020-02-28 PROCEDURE — 96372 THER/PROPH/DIAG INJ SC/IM: CPT

## 2020-02-28 PROCEDURE — 25010000002 HYDROMORPHONE 1 MG/ML SOLUTION: Performed by: NURSE PRACTITIONER

## 2020-02-28 PROCEDURE — 72110 X-RAY EXAM L-2 SPINE 4/>VWS: CPT

## 2020-02-28 PROCEDURE — 25010000002 PROMETHAZINE PER 50 MG: Performed by: NURSE PRACTITIONER

## 2020-02-28 RX ORDER — OXYCODONE HYDROCHLORIDE AND ACETAMINOPHEN 5; 325 MG/1; MG/1
1 TABLET ORAL EVERY 8 HOURS PRN
Qty: 8 TABLET | Refills: 0 | Status: SHIPPED | OUTPATIENT
Start: 2020-02-28 | End: 2020-03-25 | Stop reason: SDUPTHER

## 2020-02-28 RX ORDER — PROMETHAZINE HYDROCHLORIDE 25 MG/ML
12.5 INJECTION, SOLUTION INTRAMUSCULAR; INTRAVENOUS ONCE
Status: COMPLETED | OUTPATIENT
Start: 2020-02-28 | End: 2020-02-28

## 2020-02-28 RX ADMIN — PROMETHAZINE HYDROCHLORIDE 12.5 MG: 25 INJECTION INTRAMUSCULAR; INTRAVENOUS at 10:17

## 2020-02-28 RX ADMIN — HYDROMORPHONE HYDROCHLORIDE 1 MG: 1 INJECTION, SOLUTION INTRAMUSCULAR; INTRAVENOUS; SUBCUTANEOUS at 10:17

## 2020-03-03 ENCOUNTER — APPOINTMENT (OUTPATIENT)
Dept: CT IMAGING | Facility: HOSPITAL | Age: 45
End: 2020-03-03

## 2020-03-03 ENCOUNTER — HOSPITAL ENCOUNTER (EMERGENCY)
Facility: HOSPITAL | Age: 45
Discharge: HOME | End: 2020-03-03
Attending: FAMILY MEDICINE | Admitting: EMERGENCY MEDICINE

## 2020-03-03 VITALS
HEIGHT: 69 IN | WEIGHT: 123 LBS | OXYGEN SATURATION: 98 % | RESPIRATION RATE: 18 BRPM | TEMPERATURE: 97.8 F | SYSTOLIC BLOOD PRESSURE: 129 MMHG | HEART RATE: 86 BPM | BODY MASS INDEX: 18.22 KG/M2 | DIASTOLIC BLOOD PRESSURE: 91 MMHG

## 2020-03-03 DIAGNOSIS — R10.9 FLANK PAIN: Primary | ICD-10-CM

## 2020-03-03 LAB
ALBUMIN SERPL-MCNC: 4.22 G/DL (ref 3.5–5.2)
ALBUMIN/GLOB SERPL: 1.7 G/DL
ALP SERPL-CCNC: 89 U/L (ref 39–117)
ALT SERPL W P-5'-P-CCNC: 68 U/L (ref 1–33)
ANION GAP SERPL CALCULATED.3IONS-SCNC: 12.6 MMOL/L (ref 5–15)
APTT PPP: 29.9 SECONDS (ref 23.8–36.1)
AST SERPL-CCNC: 57 U/L (ref 1–32)
BACTERIA UR QL AUTO: ABNORMAL /HPF
BASOPHILS # BLD AUTO: 0.02 10*3/MM3 (ref 0–0.2)
BASOPHILS NFR BLD AUTO: 0.4 % (ref 0–1.5)
BILIRUB SERPL-MCNC: 0.4 MG/DL (ref 0.2–1.2)
BILIRUB UR QL STRIP: NEGATIVE
BUN BLD-MCNC: 10 MG/DL (ref 6–20)
BUN/CREAT SERPL: 17.5 (ref 7–25)
CALCIUM SPEC-SCNC: 9.1 MG/DL (ref 8.6–10.5)
CHLORIDE SERPL-SCNC: 102 MMOL/L (ref 98–107)
CLARITY UR: ABNORMAL
CO2 SERPL-SCNC: 26.4 MMOL/L (ref 22–29)
COLOR UR: ABNORMAL
CREAT BLD-MCNC: 0.57 MG/DL (ref 0.57–1)
DEPRECATED RDW RBC AUTO: 45.7 FL (ref 37–54)
EOSINOPHIL # BLD AUTO: 0.16 10*3/MM3 (ref 0–0.4)
EOSINOPHIL NFR BLD AUTO: 2.8 % (ref 0.3–6.2)
ERYTHROCYTE [DISTWIDTH] IN BLOOD BY AUTOMATED COUNT: 13.6 % (ref 12.3–15.4)
GFR SERPL CREATININE-BSD FRML MDRD: 115 ML/MIN/1.73
GLOBULIN UR ELPH-MCNC: 2.5 GM/DL
GLUCOSE BLD-MCNC: 104 MG/DL (ref 65–99)
GLUCOSE UR STRIP-MCNC: NEGATIVE MG/DL
HCT VFR BLD AUTO: 41.4 % (ref 34–46.6)
HGB BLD-MCNC: 13.8 G/DL (ref 12–15.9)
HGB UR QL STRIP.AUTO: ABNORMAL
HYALINE CASTS UR QL AUTO: ABNORMAL /LPF
IMM GRANULOCYTES # BLD AUTO: 0.02 10*3/MM3 (ref 0–0.05)
IMM GRANULOCYTES NFR BLD AUTO: 0.4 % (ref 0–0.5)
INR PPP: 0.89 (ref 0.9–1.1)
KETONES UR QL STRIP: NEGATIVE
LEUKOCYTE ESTERASE UR QL STRIP.AUTO: ABNORMAL
LIPASE SERPL-CCNC: 50 U/L (ref 13–60)
LYMPHOCYTES # BLD AUTO: 2.19 10*3/MM3 (ref 0.7–3.1)
LYMPHOCYTES NFR BLD AUTO: 38.5 % (ref 19.6–45.3)
MCH RBC QN AUTO: 30.3 PG (ref 26.6–33)
MCHC RBC AUTO-ENTMCNC: 33.3 G/DL (ref 31.5–35.7)
MCV RBC AUTO: 90.8 FL (ref 79–97)
MONOCYTES # BLD AUTO: 0.34 10*3/MM3 (ref 0.1–0.9)
MONOCYTES NFR BLD AUTO: 6 % (ref 5–12)
NEUTROPHILS # BLD AUTO: 2.96 10*3/MM3 (ref 1.7–7)
NEUTROPHILS NFR BLD AUTO: 51.9 % (ref 42.7–76)
NITRITE UR QL STRIP: NEGATIVE
NRBC BLD AUTO-RTO: 0 /100 WBC (ref 0–0.2)
PH UR STRIP.AUTO: 6 [PH] (ref 5–8)
PLATELET # BLD AUTO: 119 10*3/MM3 (ref 140–450)
PMV BLD AUTO: 10.8 FL (ref 6–12)
POTASSIUM BLD-SCNC: 3.5 MMOL/L (ref 3.5–5.2)
PROT SERPL-MCNC: 6.7 G/DL (ref 6–8.5)
PROT UR QL STRIP: ABNORMAL
PROTHROMBIN TIME: 12.4 SECONDS (ref 11–15.4)
RBC # BLD AUTO: 4.56 10*6/MM3 (ref 3.77–5.28)
RBC # UR: ABNORMAL /HPF
REF LAB TEST METHOD: ABNORMAL
SODIUM BLD-SCNC: 141 MMOL/L (ref 136–145)
SP GR UR STRIP: 1.01 (ref 1–1.03)
SQUAMOUS #/AREA URNS HPF: ABNORMAL /HPF
UROBILINOGEN UR QL STRIP: ABNORMAL
WBC NRBC COR # BLD: 5.69 10*3/MM3 (ref 3.4–10.8)
WBC UR QL AUTO: ABNORMAL /HPF

## 2020-03-03 PROCEDURE — 85025 COMPLETE CBC W/AUTO DIFF WBC: CPT | Performed by: FAMILY MEDICINE

## 2020-03-03 PROCEDURE — 25010000002 HYDROMORPHONE 1 MG/ML SOLUTION: Performed by: FAMILY MEDICINE

## 2020-03-03 PROCEDURE — 25010000002 PROMETHAZINE PER 50 MG: Performed by: FAMILY MEDICINE

## 2020-03-03 PROCEDURE — 96366 THER/PROPH/DIAG IV INF ADDON: CPT

## 2020-03-03 PROCEDURE — 25010000002 HYDROMORPHONE PER 4 MG: Performed by: FAMILY MEDICINE

## 2020-03-03 PROCEDURE — 96375 TX/PRO/DX INJ NEW DRUG ADDON: CPT

## 2020-03-03 PROCEDURE — 83690 ASSAY OF LIPASE: CPT | Performed by: FAMILY MEDICINE

## 2020-03-03 PROCEDURE — 80053 COMPREHEN METABOLIC PANEL: CPT | Performed by: FAMILY MEDICINE

## 2020-03-03 PROCEDURE — 96365 THER/PROPH/DIAG IV INF INIT: CPT

## 2020-03-03 PROCEDURE — 85730 THROMBOPLASTIN TIME PARTIAL: CPT | Performed by: FAMILY MEDICINE

## 2020-03-03 PROCEDURE — 85610 PROTHROMBIN TIME: CPT | Performed by: FAMILY MEDICINE

## 2020-03-03 PROCEDURE — 96376 TX/PRO/DX INJ SAME DRUG ADON: CPT

## 2020-03-03 PROCEDURE — 81001 URINALYSIS AUTO W/SCOPE: CPT | Performed by: FAMILY MEDICINE

## 2020-03-03 PROCEDURE — 74176 CT ABD & PELVIS W/O CONTRAST: CPT | Performed by: RADIOLOGY

## 2020-03-03 PROCEDURE — 99284 EMERGENCY DEPT VISIT MOD MDM: CPT

## 2020-03-03 PROCEDURE — 74176 CT ABD & PELVIS W/O CONTRAST: CPT

## 2020-03-03 PROCEDURE — 25010000002 HYDROMORPHONE 1 MG/ML SOLUTION: Performed by: EMERGENCY MEDICINE

## 2020-03-03 PROCEDURE — 25010000002 PROMETHAZINE PER 50 MG: Performed by: EMERGENCY MEDICINE

## 2020-03-03 PROCEDURE — 87086 URINE CULTURE/COLONY COUNT: CPT | Performed by: FAMILY MEDICINE

## 2020-03-03 RX ORDER — HYDROMORPHONE HYDROCHLORIDE 1 MG/ML
0.5 INJECTION, SOLUTION INTRAMUSCULAR; INTRAVENOUS; SUBCUTANEOUS ONCE
Status: COMPLETED | OUTPATIENT
Start: 2020-03-03 | End: 2020-03-03

## 2020-03-03 RX ORDER — SODIUM CHLORIDE 0.9 % (FLUSH) 0.9 %
10 SYRINGE (ML) INJECTION AS NEEDED
Status: DISCONTINUED | OUTPATIENT
Start: 2020-03-03 | End: 2020-03-03 | Stop reason: HOSPADM

## 2020-03-03 RX ORDER — PROMETHAZINE HYDROCHLORIDE 25 MG/1
25 TABLET ORAL EVERY 6 HOURS PRN
Qty: 10 TABLET | Refills: 0 | OUTPATIENT
Start: 2020-03-03 | End: 2020-04-09

## 2020-03-03 RX ORDER — PROMETHAZINE HYDROCHLORIDE 25 MG/ML
12.5 INJECTION, SOLUTION INTRAMUSCULAR; INTRAVENOUS ONCE
Status: COMPLETED | OUTPATIENT
Start: 2020-03-03 | End: 2020-03-03

## 2020-03-03 RX ORDER — HYDROCODONE BITARTRATE AND ACETAMINOPHEN 7.5; 325 MG/1; MG/1
1 TABLET ORAL EVERY 6 HOURS PRN
Qty: 8 TABLET | Refills: 0 | Status: SHIPPED | OUTPATIENT
Start: 2020-03-03 | End: 2020-03-25 | Stop reason: SDUPTHER

## 2020-03-03 RX ORDER — PROCHLORPERAZINE EDISYLATE 5 MG/ML
10 INJECTION INTRAMUSCULAR; INTRAVENOUS ONCE
Status: DISCONTINUED | OUTPATIENT
Start: 2020-03-03 | End: 2020-03-03

## 2020-03-03 RX ADMIN — PROMETHAZINE HYDROCHLORIDE 12.5 MG: 25 INJECTION INTRAMUSCULAR; INTRAVENOUS at 06:45

## 2020-03-03 RX ADMIN — HYDROMORPHONE HYDROCHLORIDE 1 MG: 1 INJECTION, SOLUTION INTRAMUSCULAR; INTRAVENOUS; SUBCUTANEOUS at 09:51

## 2020-03-03 RX ADMIN — SODIUM CHLORIDE 500 ML: 9 INJECTION, SOLUTION INTRAVENOUS at 09:50

## 2020-03-03 RX ADMIN — PROMETHAZINE HYDROCHLORIDE 12.5 MG: 25 INJECTION INTRAMUSCULAR; INTRAVENOUS at 09:50

## 2020-03-03 RX ADMIN — HYDROMORPHONE HYDROCHLORIDE 0.5 MG: 1 INJECTION, SOLUTION INTRAMUSCULAR; INTRAVENOUS; SUBCUTANEOUS at 06:45

## 2020-03-03 RX ADMIN — PROMETHAZINE HYDROCHLORIDE 12.5 MG: 25 INJECTION, SOLUTION INTRAMUSCULAR; INTRAVENOUS at 05:19

## 2020-03-03 RX ADMIN — SODIUM CHLORIDE 1000 ML: 9 INJECTION, SOLUTION INTRAVENOUS at 05:18

## 2020-03-03 RX ADMIN — HYDROMORPHONE HYDROCHLORIDE 0.5 MG: 1 INJECTION, SOLUTION INTRAMUSCULAR; INTRAVENOUS; SUBCUTANEOUS at 05:18

## 2020-03-03 NOTE — ED NOTES
Patient states she has been throwing up and is hurting again. Dr. Ortiz notified.      Lili Trent RN  03/03/20 0980

## 2020-03-03 NOTE — ED NOTES
Pt resting quietly on stretcher with improved complaints of flank pain and vomiting.  Pt AAOx4 with no resp distress noted, respirations even and unlabored.  Pt denies any needs at this time.  Skin PWD.  Pt family at bedside. Will continue to monitor and follow plan of care.  Bed rails up x2, bed in lowest position, call light in reach.           Latricia Condon, RN  03/03/20 0245

## 2020-03-03 NOTE — ED PROVIDER NOTES
Subjective     History provided by:  Patient  Flank Pain   Pain location:  R flank  Pain quality: cramping and gnawing    Pain radiates to:  Groin  Pain severity:  Severe  Onset quality:  Sudden  Timing:  Constant  Progression:  Unchanged  Chronicity:  New  Relieved by:  Nothing  Worsened by:  Nothing  Ineffective treatments:  None tried  Associated symptoms: chills, dysuria, nausea and vomiting    Associated symptoms: no chest pain and no fever        Review of Systems   Constitutional: Positive for chills. Negative for fever.   HENT: Negative.    Respiratory: Negative.    Cardiovascular: Negative.  Negative for chest pain.   Gastrointestinal: Positive for nausea and vomiting. Negative for abdominal pain.   Endocrine: Negative.    Genitourinary: Positive for dysuria and flank pain.   Skin: Negative.    Neurological: Negative.    Psychiatric/Behavioral: Negative.    All other systems reviewed and are negative.      Past Medical History:   Diagnosis Date   • Abdominal pain    • Abdominal swelling    • Hoyos's disease    • Bipolar 1 disorder (CMS/HCC)    • Brain tumor (benign) (CMS/HCC)    • Brain tumor (CMS/HCC)     R Frontal Lobe per pt   • Brain tumor (CMS/HCC) 2014   • Constipation    • DDD (degenerative disc disease), cervical 05/29/2017   • DDD (degenerative disc disease), cervical    • Diarrhea    • Fibromyalgia    • IBS (irritable bowel syndrome)    • Migraine    • Nausea & vomiting    • PONV (postoperative nausea and vomiting)    • PTSD (post-traumatic stress disorder)    • Rectal bleeding        Allergies   Allergen Reactions   • Ativan [Lorazepam] Hallucinations     confusion   • Sulfa Antibiotics Shortness Of Breath and Swelling   • Compazine [Prochlorperazine Edisylate] Hives   • Demerol [Meperidine] Hives   • Droperidol Itching   • Metoclopramide Swelling   • Toradol [Ketorolac Tromethamine] Hives and Itching   • Zofran [Ondansetron Hcl] Rash       Past Surgical History:   Procedure Laterality Date   •  ANAL SCOPE N/A 2016    Procedure: ANAL SCOPE;  Surgeon: Kael Lopez MD;  Location: [facility] OR;  Service:    • APPENDECTOMY     • COLONOSCOPY N/A 2016    Procedure: COLONOSCOPY  CPTCODE:11961;  Surgeon: Jose Antonio Belle III, MD;  Location: [facility] OR;  Service:    • COLONOSCOPY N/A 2016    Procedure: COLONOSCOPY (43482) CPT;  Surgeon: Jose Antonio Belle III, MD;  Location: [facility] OR;  Service:    • CYSTOSCOPY RETROGRADE PYELOGRAM Bilateral 2017    Procedure: CYSTOSCOPY RETROGRADE PYELOGRAM;  Surgeon: Mu Blunt MD;  Location: [facility] OR;  Service:    • ENDOSCOPY N/A 2016    Procedure: ESOPHAGOGASTRODUODENOSCOPY WITH BIOPSY  CPTCODE:86556;  Surgeon: Jose Antonio Belle III, MD;  Location: [facility] OR;  Service:    • HEMORRHOIDECTOMY N/A 2016    Procedure: HEMORRHOID STAPLING;  Surgeon: Kael Lopez MD;  Location: [facility] OR;  Service:    • HYSTERECTOMY     • KNEE SURGERY     • LAPAROSCOPIC SALPINGOOPHERECTOMY     • PORTACATH PLACEMENT N/A 2017    Procedure: INSERTION OF PORTACATH;  Surgeon: Celso Arredondo MD;  Location: [facility] OR;  Service:    • SHOULDER SURGERY      3 times       Family History   Problem Relation Age of Onset   • Crohn's disease Other    • Hypertension Other    • Diabetes Other    • Irritable bowel syndrome Other        Social History     Socioeconomic History   • Marital status:      Spouse name: Not on file   • Number of children: Not on file   • Years of education: Not on file   • Highest education level: Not on file   Tobacco Use   • Smoking status: Former Smoker     Packs/day: 1.00     Years: 20.00     Pack years: 20.00     Last attempt to quit: 2015     Years since quittin.3   • Smokeless tobacco: Never Used   Substance and Sexual Activity   • Alcohol use: No   • Drug use: Yes     Types: Marijuana     Comment: for pain   • Sexual activity: Defer     Birth control/protection: Surgical   Social History Narrative    Lives in  [place], works as a , independent of ADL           Objective   Physical Exam   Constitutional: She is oriented to person, place, and time. She appears well-developed and well-nourished. She appears distressed.   HENT:   Head: Normocephalic and atraumatic.   Right Ear: External ear normal.   Left Ear: External ear normal.   Mouth/Throat: Oropharynx is clear and moist.   Eyes: Pupils are equal, round, and reactive to light. EOM are normal.   Neck: Neck supple.   Cardiovascular: Normal rate and regular rhythm.   Pulmonary/Chest: Effort normal and breath sounds normal.   Abdominal: Soft.   Musculoskeletal: Normal range of motion.   Neurological: She is alert and oriented to person, place, and time.   Skin: Skin is warm. Capillary refill takes less than 2 seconds.   Psychiatric: She has a normal mood and affect. Her behavior is normal. Judgment and thought content normal.   Nursing note and vitals reviewed.      Procedures           ED Course  ED Course as of Mar 03 1646   Tue Mar 03, 2020   0814 Endorsed to Dr Ortiz at shift change    []   1019 I assumed patient's care from Dr. Fuller this morning at shift change.  Please see her documentation above.  Patient is doing well.  She complained of continued right flank pain and nausea, advised me that she feels that she had probably just passed her stone prior to getting the CT scan.  She reports that this compression fracture of L2 occurred approximately a month ago, she is following with spine surgery in [place], is wearing a back brace.  We discussed her test results and her plan of care.  She voices understanding and agreement.  She is discharged home in care of family.  Romeo report is obtained, #[id number].    [CM]      ED Course User Index  [CM] Stevenson Ortiz MD  [MH] Rowena Fuller DO                                           [facility]    Final diagnoses:   Flank pain            Stevenson Ortiz MD  03/03/20 1646       Stevenson Ortiz,  MD  03/03/20 5148

## 2020-03-04 LAB — BACTERIA SPEC AEROBE CULT: NORMAL

## 2020-03-19 ENCOUNTER — HOSPITAL ENCOUNTER (EMERGENCY)
Facility: HOSPITAL | Age: 45
Discharge: HOME | End: 2020-03-19
Attending: FAMILY MEDICINE | Admitting: FAMILY MEDICINE

## 2020-03-19 ENCOUNTER — OFFICE VISIT (OUTPATIENT)
Dept: UROLOGY | Facility: CLINIC | Age: 45
End: 2020-03-19

## 2020-03-19 ENCOUNTER — APPOINTMENT (OUTPATIENT)
Dept: CT IMAGING | Facility: HOSPITAL | Age: 45
End: 2020-03-19

## 2020-03-19 VITALS
HEIGHT: 69 IN | SYSTOLIC BLOOD PRESSURE: 122 MMHG | TEMPERATURE: 98.2 F | WEIGHT: 110.2 LBS | BODY MASS INDEX: 16.32 KG/M2 | DIASTOLIC BLOOD PRESSURE: 78 MMHG

## 2020-03-19 VITALS
DIASTOLIC BLOOD PRESSURE: 73 MMHG | WEIGHT: 125 LBS | HEART RATE: 61 BPM | OXYGEN SATURATION: 99 % | SYSTOLIC BLOOD PRESSURE: 126 MMHG | TEMPERATURE: 98 F | RESPIRATION RATE: 18 BRPM | BODY MASS INDEX: 18.51 KG/M2 | HEIGHT: 69 IN

## 2020-03-19 DIAGNOSIS — N35.028 OTHER POST-TRAUMATIC URETHRAL STRICTURE, FEMALE: ICD-10-CM

## 2020-03-19 DIAGNOSIS — R10.9 LEFT FLANK PAIN: ICD-10-CM

## 2020-03-19 DIAGNOSIS — Z48.816 AFTERCARE FOLLOWING SURGERY OF THE GENITOURINARY SYSTEM: ICD-10-CM

## 2020-03-19 DIAGNOSIS — R31.9 HEMATURIA, UNSPECIFIED TYPE: Primary | ICD-10-CM

## 2020-03-19 DIAGNOSIS — R31.0 GROSS HEMATURIA: Primary | ICD-10-CM

## 2020-03-19 LAB
ALBUMIN SERPL-MCNC: 3.88 G/DL (ref 3.5–5.2)
ALBUMIN/GLOB SERPL: 1.7 G/DL
ALP SERPL-CCNC: 69 U/L (ref 39–117)
ALT SERPL W P-5'-P-CCNC: 54 U/L (ref 1–33)
ANION GAP SERPL CALCULATED.3IONS-SCNC: 8.7 MMOL/L (ref 5–15)
AST SERPL-CCNC: 53 U/L (ref 1–32)
BACTERIA UR QL AUTO: ABNORMAL /HPF
BASOPHILS # BLD AUTO: 0.01 10*3/MM3 (ref 0–0.2)
BASOPHILS NFR BLD AUTO: 0.3 % (ref 0–1.5)
BILIRUB BLD-MCNC: NEGATIVE MG/DL
BILIRUB SERPL-MCNC: 0.3 MG/DL (ref 0.2–1.2)
BILIRUB UR QL STRIP: NEGATIVE
BUN BLD-MCNC: 6 MG/DL (ref 6–20)
BUN/CREAT SERPL: 10.9 (ref 7–25)
CALCIUM SPEC-SCNC: 8.7 MG/DL (ref 8.6–10.5)
CHLORIDE SERPL-SCNC: 107 MMOL/L (ref 98–107)
CLARITY UR: ABNORMAL
CLARITY, POC: CLEAR
CO2 SERPL-SCNC: 27.3 MMOL/L (ref 22–29)
COLOR UR: ABNORMAL
COLOR UR: YELLOW
CREAT BLD-MCNC: 0.55 MG/DL (ref 0.57–1)
CRP SERPL-MCNC: 0.03 MG/DL (ref 0–0.5)
DEPRECATED RDW RBC AUTO: 45.3 FL (ref 37–54)
EOSINOPHIL # BLD AUTO: 0.1 10*3/MM3 (ref 0–0.4)
EOSINOPHIL NFR BLD AUTO: 2.7 % (ref 0.3–6.2)
ERYTHROCYTE [DISTWIDTH] IN BLOOD BY AUTOMATED COUNT: 13.5 % (ref 12.3–15.4)
GFR SERPL CREATININE-BSD FRML MDRD: 120 ML/MIN/1.73
GLOBULIN UR ELPH-MCNC: 2.2 GM/DL
GLUCOSE BLD-MCNC: 95 MG/DL (ref 65–99)
GLUCOSE UR STRIP-MCNC: NEGATIVE MG/DL
GLUCOSE UR STRIP-MCNC: NEGATIVE MG/DL
HCT VFR BLD AUTO: 38 % (ref 34–46.6)
HGB BLD-MCNC: 12.6 G/DL (ref 12–15.9)
HGB UR QL STRIP.AUTO: ABNORMAL
HYALINE CASTS UR QL AUTO: ABNORMAL /LPF
IMM GRANULOCYTES # BLD AUTO: 0.01 10*3/MM3 (ref 0–0.05)
IMM GRANULOCYTES NFR BLD AUTO: 0.3 % (ref 0–0.5)
KETONES UR QL STRIP: NEGATIVE
KETONES UR QL: NEGATIVE
LEUKOCYTE EST, POC: NEGATIVE
LEUKOCYTE ESTERASE UR QL STRIP.AUTO: ABNORMAL
LIPASE SERPL-CCNC: 55 U/L (ref 13–60)
LYMPHOCYTES # BLD AUTO: 1.2 10*3/MM3 (ref 0.7–3.1)
LYMPHOCYTES NFR BLD AUTO: 32.3 % (ref 19.6–45.3)
MCH RBC QN AUTO: 30.1 PG (ref 26.6–33)
MCHC RBC AUTO-ENTMCNC: 33.2 G/DL (ref 31.5–35.7)
MCV RBC AUTO: 90.9 FL (ref 79–97)
MONOCYTES # BLD AUTO: 0.26 10*3/MM3 (ref 0.1–0.9)
MONOCYTES NFR BLD AUTO: 7 % (ref 5–12)
NEUTROPHILS # BLD AUTO: 2.13 10*3/MM3 (ref 1.7–7)
NEUTROPHILS NFR BLD AUTO: 57.4 % (ref 42.7–76)
NITRITE UR QL STRIP: NEGATIVE
NITRITE UR-MCNC: NEGATIVE MG/ML
NRBC BLD AUTO-RTO: 0 /100 WBC (ref 0–0.2)
PH UR STRIP.AUTO: 8 [PH] (ref 5–8)
PH UR: 6 [PH] (ref 5–8)
PLATELET # BLD AUTO: 129 10*3/MM3 (ref 140–450)
PMV BLD AUTO: 10.4 FL (ref 6–12)
POTASSIUM BLD-SCNC: 4 MMOL/L (ref 3.5–5.2)
PROT SERPL-MCNC: 6.1 G/DL (ref 6–8.5)
PROT UR QL STRIP: NEGATIVE
PROT UR STRIP-MCNC: NEGATIVE MG/DL
RBC # BLD AUTO: 4.18 10*6/MM3 (ref 3.77–5.28)
RBC # UR STRIP: ABNORMAL /UL
RBC # UR: ABNORMAL /HPF
REF LAB TEST METHOD: ABNORMAL
SODIUM BLD-SCNC: 143 MMOL/L (ref 136–145)
SP GR UR STRIP: 1.01 (ref 1–1.03)
SP GR UR: 1.01 (ref 1–1.03)
SQUAMOUS #/AREA URNS HPF: ABNORMAL /HPF
UROBILINOGEN UR QL STRIP: ABNORMAL
UROBILINOGEN UR QL: NORMAL
WBC NRBC COR # BLD: 3.71 10*3/MM3 (ref 3.4–10.8)
WBC UR QL AUTO: ABNORMAL /HPF

## 2020-03-19 PROCEDURE — 86140 C-REACTIVE PROTEIN: CPT | Performed by: FAMILY MEDICINE

## 2020-03-19 PROCEDURE — 96375 TX/PRO/DX INJ NEW DRUG ADDON: CPT

## 2020-03-19 PROCEDURE — 25010000003 HEPARIN LOCK FLUCH PER 10 UNITS: Performed by: FAMILY MEDICINE

## 2020-03-19 PROCEDURE — 25010000002 HYDROMORPHONE 1 MG/ML SOLUTION: Performed by: FAMILY MEDICINE

## 2020-03-19 PROCEDURE — 96372 THER/PROPH/DIAG INJ SC/IM: CPT | Performed by: UROLOGY

## 2020-03-19 PROCEDURE — 80053 COMPREHEN METABOLIC PANEL: CPT | Performed by: FAMILY MEDICINE

## 2020-03-19 PROCEDURE — 53661 DILATION OF URETHRA: CPT | Performed by: UROLOGY

## 2020-03-19 PROCEDURE — 85025 COMPLETE CBC W/AUTO DIFF WBC: CPT | Performed by: FAMILY MEDICINE

## 2020-03-19 PROCEDURE — 74176 CT ABD & PELVIS W/O CONTRAST: CPT | Performed by: RADIOLOGY

## 2020-03-19 PROCEDURE — 83690 ASSAY OF LIPASE: CPT | Performed by: FAMILY MEDICINE

## 2020-03-19 PROCEDURE — 96365 THER/PROPH/DIAG IV INF INIT: CPT

## 2020-03-19 PROCEDURE — 81001 URINALYSIS AUTO W/SCOPE: CPT | Performed by: FAMILY MEDICINE

## 2020-03-19 PROCEDURE — 99283 EMERGENCY DEPT VISIT LOW MDM: CPT

## 2020-03-19 PROCEDURE — 74176 CT ABD & PELVIS W/O CONTRAST: CPT

## 2020-03-19 PROCEDURE — 96366 THER/PROPH/DIAG IV INF ADDON: CPT

## 2020-03-19 PROCEDURE — 87086 URINE CULTURE/COLONY COUNT: CPT | Performed by: FAMILY MEDICINE

## 2020-03-19 PROCEDURE — 81003 URINALYSIS AUTO W/O SCOPE: CPT | Performed by: UROLOGY

## 2020-03-19 PROCEDURE — 25010000002 PROMETHAZINE PER 50 MG: Performed by: FAMILY MEDICINE

## 2020-03-19 RX ORDER — OXYCODONE AND ACETAMINOPHEN 10; 325 MG/1; MG/1
1 TABLET ORAL EVERY 6 HOURS PRN
Qty: 8 TABLET | Refills: 0 | Status: SHIPPED | OUTPATIENT
Start: 2020-03-19 | End: 2020-05-04 | Stop reason: SDUPTHER

## 2020-03-19 RX ORDER — SODIUM CHLORIDE 0.9 % (FLUSH) 0.9 %
10 SYRINGE (ML) INJECTION AS NEEDED
Status: DISCONTINUED | OUTPATIENT
Start: 2020-03-19 | End: 2020-03-19 | Stop reason: HOSPADM

## 2020-03-19 RX ORDER — GENTAMICIN SULFATE 40 MG/ML
80 INJECTION, SOLUTION INTRAMUSCULAR; INTRAVENOUS ONCE
Status: COMPLETED | OUTPATIENT
Start: 2020-03-19 | End: 2020-03-19

## 2020-03-19 RX ORDER — HEPARIN SODIUM (PORCINE) LOCK FLUSH IV SOLN 100 UNIT/ML 100 UNIT/ML
300 SOLUTION INTRAVENOUS ONCE
Status: COMPLETED | OUTPATIENT
Start: 2020-03-19 | End: 2020-03-19

## 2020-03-19 RX ADMIN — SODIUM CHLORIDE 1000 ML: 9 INJECTION, SOLUTION INTRAVENOUS at 09:38

## 2020-03-19 RX ADMIN — Medication 300 UNITS: at 11:22

## 2020-03-19 RX ADMIN — GENTAMICIN SULFATE 80 MG: 40 INJECTION, SOLUTION INTRAMUSCULAR; INTRAVENOUS at 14:34

## 2020-03-19 RX ADMIN — HYDROMORPHONE HYDROCHLORIDE 1 MG: 1 INJECTION, SOLUTION INTRAMUSCULAR; INTRAVENOUS; SUBCUTANEOUS at 09:34

## 2020-03-19 RX ADMIN — PROMETHAZINE HYDROCHLORIDE 12.5 MG: 25 INJECTION, SOLUTION INTRAMUSCULAR; INTRAVENOUS at 09:37

## 2020-03-19 NOTE — PROGRESS NOTES
Chief Complaint:          Chief Complaint   Patient presents with   • Blood in Urine     f/u       HPI:   45 y.o. female returns today accompanied by her  she is not been seen for several months because she lost her insurance.  She is lost an excessive amount of weight she looks almost moribund.  She states she is actually gained 15 pounds from her current weight of 110 pounds.  She has severe urethral meatal stenosis and is requesting dilation today she was also told the [facility] that she had a rising hCG and I strongly recommended she follow-up with the her gynecologist in [place].  As any absence of uterus and ovaries this may be representative of a tumor.  Nonetheless, I am going to recommend dilation after unremarkable prep and drape I dilated it was extremely tight to 26 French      Past Medical History:        Past Medical History:   Diagnosis Date   • Abdominal pain    • Abdominal swelling    • Hoyos's disease    • Bipolar 1 disorder (CMS/HCC)    • Brain tumor (benign) (CMS/HCC)    • Brain tumor (CMS/HCC)     R Frontal Lobe per pt   • Brain tumor (CMS/HCC) 2014   • Constipation    • DDD (degenerative disc disease), cervical 05/29/2017   • DDD (degenerative disc disease), cervical    • Diarrhea    • Fibromyalgia    • IBS (irritable bowel syndrome)    • Migraine    • Nausea & vomiting    • PONV (postoperative nausea and vomiting)    • PTSD (post-traumatic stress disorder)    • Rectal bleeding          Current Meds:     Current Outpatient Medications   Medication Sig Dispense Refill   • albuterol (PROVENTIL HFA;VENTOLIN HFA) 108 (90 Base) MCG/ACT inhaler Inhale 2 puffs 2 (Two) Times a Day.     • Azelastine HCl 137 MCG/SPRAY solution 1 spray into each nostril As Needed (Allergies).     • busPIRone (BUSPAR) 15 MG tablet Take 15 mg by mouth 3 (three) times a day        • cetirizine (zyrTEC) 10 MG tablet Take 1 tablet by mouth Daily. 30 tablet 0   • divalproex (DEPAKOTE) 500 MG DR tablet  Take 1 tablet by mouth 3 (Three) Times a Day. 90 tablet 2   • fluticasone (VERAMYST) 27.5 MCG/SPRAY nasal spray 2 sprays into each nostril Daily.     • guaiFENesin (MUCINEX) 600 MG 12 hr tablet Take 1 tablet by mouth Every 12 (Twelve) Hours. 28 tablet 0   • HYDROcodone-acetaminophen (NORCO) 7.5-325 MG per tablet Take 1 tablet by mouth Every 6 (Six) Hours As Needed (pain). 8 tablet 0   • montelukast (SINGULAIR) 10 MG tablet Take 10 mg by mouth Every Night.     • orphenadrine (NORFLEX) 100 MG 12 hr tablet Take 1 tablet by mouth 2 (Two) Times a Day. 20 tablet 0   • pantoprazole (PROTONIX) 40 MG EC tablet Take 40 mg by mouth 2 (Two) Times a Day As Needed.     • promethazine (PHENERGAN) 25 MG tablet Take 1 tablet by mouth Every 6 (Six) Hours As Needed for Nausea or Vomiting. 10 tablet 0   • sertraline (ZOLOFT) 100 MG tablet Take 100 mg by mouth 2 (Two) Times a Day.     • SUMAtriptan (IMITREX) 6 MG/0.5ML solution injection Inject 6 mg under the skin 1 (One) Time.       No current facility-administered medications for this visit.         Allergies:      Allergies   Allergen Reactions   • Ativan [Lorazepam] Hallucinations     confusion   • Sulfa Antibiotics Shortness Of Breath and Swelling   • Compazine [Prochlorperazine Edisylate] Hives   • Demerol [Meperidine] Hives   • Droperidol Itching   • Metoclopramide Swelling   • Toradol [Ketorolac Tromethamine] Hives and Itching   • Zofran [Ondansetron Hcl] Rash        Past Surgical History:     Past Surgical History:   Procedure Laterality Date   • ANAL SCOPE N/A 7/28/2016    Procedure: ANAL SCOPE;  Surgeon: Kael Lopez MD;  Location: [facility] OR;  Service:    • APPENDECTOMY     • COLONOSCOPY N/A 6/30/2016    Procedure: COLONOSCOPY  CPTCODE:80585;  Surgeon: Jose Antonio Belle III, MD;  Location: [facility] OR;  Service:    • COLONOSCOPY N/A 7/7/2016    Procedure: COLONOSCOPY (88748) CPT;  Surgeon: Jose Antonio Belle III, MD;  Location: [facility] OR;  Service:    • CYSTOSCOPY  RETROGRADE PYELOGRAM Bilateral 2017    Procedure: CYSTOSCOPY RETROGRADE PYELOGRAM;  Surgeon: Mu Blunt MD;  Location: [facility];  Service:    • ENDOSCOPY N/A 2016    Procedure: ESOPHAGOGASTRODUODENOSCOPY WITH BIOPSY  CPTCODE:60064;  Surgeon: Jose Antonio Belle III, MD;  Location: [facility];  Service:    • HEMORRHOIDECTOMY N/A 2016    Procedure: HEMORRHOID STAPLING;  Surgeon: Kael Lopez MD;  Location: [facility] OR;  Service:    • HYSTERECTOMY     • KNEE SURGERY     • LAPAROSCOPIC SALPINGOOPHERECTOMY     • PORTACATH PLACEMENT N/A 2017    Procedure: INSERTION OF PORTACATH;  Surgeon: Celso Arredondo MD;  Location: [facility];  Service:    • SHOULDER SURGERY      3 times         Social History:     Social History     Socioeconomic History   • Marital status:      Spouse name: Not on file   • Number of children: Not on file   • Years of education: Not on file   • Highest education level: Not on file   Tobacco Use   • Smoking status: Former Smoker     Packs/day: 1.00     Years: 20.00     Pack years: 20.00     Last attempt to quit: 2015     Years since quittin.3   • Smokeless tobacco: Never Used   Substance and Sexual Activity   • Alcohol use: No   • Drug use: Yes     Types: Marijuana     Comment: for pain   • Sexual activity: Defer     Birth control/protection: Surgical   Social History Narrative    Lives in [place], works as a , independent of ADL       Family History:     Family History   Problem Relation Age of Onset   • Crohn's disease Other    • Hypertension Other    • Diabetes Other    • Irritable bowel syndrome Other        Review of Systems:     Review of Systems   Constitutional: Negative.  Negative for activity change, appetite change, chills, diaphoresis, fatigue and unexpected weight change.   HENT: Negative for congestion, dental problem, drooling, ear discharge, ear pain, facial swelling, hearing loss, mouth sores, nosebleeds, postnasal drip,  rhinorrhea, sinus pressure, sneezing, sore throat, tinnitus, trouble swallowing and voice change.    Eyes: Negative.  Negative for photophobia, pain, discharge, redness, itching and visual disturbance.   Respiratory: Negative.  Negative for apnea, cough, choking, chest tightness, shortness of breath, wheezing and stridor.    Cardiovascular: Negative.  Negative for chest pain, palpitations and leg swelling.   Gastrointestinal: Negative.  Negative for abdominal distention, abdominal pain, anal bleeding, blood in stool, constipation, diarrhea, nausea, rectal pain and vomiting.   Endocrine: Negative.  Negative for cold intolerance, heat intolerance, polydipsia, polyphagia and polyuria.   Genitourinary: Positive for difficulty urinating.   Musculoskeletal: Negative.  Negative for arthralgias, back pain, gait problem, joint swelling, myalgias, neck pain and neck stiffness.   Skin: Negative.  Negative for color change, pallor, rash and wound.   Allergic/Immunologic: Negative.  Negative for environmental allergies, food allergies and immunocompromised state.   Neurological: Negative.  Negative for dizziness, tremors, seizures, syncope, facial asymmetry, speech difficulty, weakness, light-headedness, numbness and headaches.   Hematological: Negative.  Negative for adenopathy. Does not bruise/bleed easily.   Psychiatric/Behavioral: Negative for agitation, behavioral problems, confusion, decreased concentration, dysphoric mood, hallucinations, self-injury, sleep disturbance and suicidal ideas. The patient is not nervous/anxious and is not hyperactive.    All other systems reviewed and are negative.      Physical Exam:     Physical Exam   Constitutional: She appears well-developed and well-nourished.   HENT:   Head: Normocephalic and atraumatic.   Right Ear: External ear normal.   Left Ear: External ear normal.   Mouth/Throat: Oropharynx is clear and moist.   Eyes: Pupils are equal, round, and reactive to light. Conjunctivae  are normal.   Cardiovascular: Normal rate, regular rhythm, normal heart sounds and intact distal pulses.   Pulmonary/Chest: Effort normal and breath sounds normal.   Abdominal: Soft. Bowel sounds are normal. She exhibits no distension and no mass. There is no tenderness. There is no rebound and no guarding.   Genitourinary: Vagina normal. No vaginal discharge found.   Musculoskeletal: Normal range of motion.   Neurological: She is alert. She has normal reflexes.   Skin: Skin is warm and dry.   Psychiatric: She has a normal mood and affect. Her behavior is normal. Judgment and thought content normal.       I have reviewed the following portions of the patient's history: allergies, current medications, past family history, past medical history, past social history, past surgical history, problem list and ROS and confirm it's accurate.      Procedure:   Urethral dilation-after an appropriate informed consent the patient was brought to the procedure suite.  The urethra was gently anesthetized with 10 cc of 2% viscous Xylocaine jelly.  After an adequate period of topical anesthesia I went ahead and the urethra was gently anesthetized and dilated with Dorado sounds from 16-26 Icelandic sequentially without complication the patient was given gentamicin as prophylaxis with 80 mg    Assessment/Plan:   Severe urethral stricture status post unremarkable dilation  Narcotic pain medication-patient has significant acute pain that I believe would be an indication for the use of narcotic pain medication.  I discussed the significant risks of pain medication and the fact that this will be a short only option and I will give her no more than a three-day supply of pain medication.  And I will not plan long-term medication and that this will be sent to a pain clinic if at all becomes necessary.  We discussed signing a pain medication agreement and the fact that we're going to run a state [facility] review to be sure the patient is not  getting pain medication from elsewhere.  If this is the case we will not give pain medication.  As part of the patient's treatment plan of there being prescribed a controlled substance with potential for abuse.  This patient has been wait aware of the appropriate dose of such medications including, the risk for somnolence, limited ability to drive and/or safety and the significant potential for overdose.  It is been made clear that these medications are for the prescribed patient only without concomitant use of alcohol or other sepsis unless prescribed by the medical provider.  Has completed prescribing agreement detailing the terms of continue prescribing him a controlled substance.  Including monitoring Luis Alberto ports, the possibility of urine drug screens and pedal counts.  The patient is aware that we reviewed LUIS ALBERTO reports on a regular basis and scan them into the chart.  History and physical examination exhibited continued safe and appropriate use of controlled substances.  Also discussed the fact that the new [place] legislation allows only a three-day prescription for pain medication.  In this situation he will be referred to a chronic pain clinic.            Patient's Body mass index is 18.46 kg/m². BMI is within normal parameters. No follow-up required..              This document has been electronically signed by VERENICE FINK MD March 19, 2020 13:36

## 2020-03-20 LAB — BACTERIA SPEC AEROBE CULT: NO GROWTH

## 2020-03-25 ENCOUNTER — OFFICE VISIT (OUTPATIENT)
Dept: UROLOGY | Facility: CLINIC | Age: 45
End: 2020-03-25

## 2020-03-25 VITALS
DIASTOLIC BLOOD PRESSURE: 64 MMHG | TEMPERATURE: 97.1 F | HEIGHT: 69 IN | BODY MASS INDEX: 15.7 KG/M2 | WEIGHT: 106 LBS | SYSTOLIC BLOOD PRESSURE: 108 MMHG

## 2020-03-25 DIAGNOSIS — R31.0 GROSS HEMATURIA: Primary | ICD-10-CM

## 2020-03-25 LAB
BILIRUB BLD-MCNC: NEGATIVE MG/DL
CLARITY, POC: ABNORMAL
COLOR UR: ABNORMAL
GLUCOSE UR STRIP-MCNC: NEGATIVE MG/DL
KETONES UR QL: NEGATIVE
LEUKOCYTE EST, POC: NEGATIVE
NITRITE UR-MCNC: NEGATIVE MG/ML
PH UR: 8.5 [PH] (ref 5–8)
PROT UR STRIP-MCNC: NEGATIVE MG/DL
RBC # UR STRIP: ABNORMAL /UL
SP GR UR: 1.01 (ref 1–1.03)
UROBILINOGEN UR QL: NORMAL

## 2020-03-25 PROCEDURE — 99213 OFFICE O/P EST LOW 20 MIN: CPT | Performed by: NURSE PRACTITIONER

## 2020-03-25 RX ORDER — OXYCODONE HYDROCHLORIDE 5 MG/1
TABLET ORAL
COMMUNITY
Start: 2020-02-20 | End: 2021-09-02 | Stop reason: SDUPTHER

## 2020-03-25 RX ORDER — ONDANSETRON 4 MG/1
TABLET, FILM COATED ORAL
COMMUNITY
Start: 2020-03-06 | End: 2021-07-28 | Stop reason: SDUPTHER

## 2020-03-25 RX ORDER — CYCLOBENZAPRINE HCL 5 MG
TABLET ORAL
COMMUNITY
Start: 2020-01-05 | End: 2022-07-19

## 2020-03-25 RX ORDER — TIZANIDINE 4 MG/1
TABLET ORAL
COMMUNITY
Start: 2020-02-03 | End: 2022-07-19

## 2020-03-25 RX ORDER — CEFUROXIME AXETIL 500 MG/1
TABLET ORAL
COMMUNITY
Start: 2020-03-19 | End: 2021-09-02

## 2020-03-25 RX ORDER — PROMETHAZINE HYDROCHLORIDE 25 MG/1
SUPPOSITORY RECTAL
COMMUNITY
Start: 2020-03-19 | End: 2020-04-09

## 2020-03-25 RX ORDER — PHENAZOPYRIDINE HYDROCHLORIDE 100 MG/1
TABLET, FILM COATED ORAL
COMMUNITY
Start: 2020-03-23 | End: 2023-01-17 | Stop reason: SDUPTHER

## 2020-03-25 NOTE — PROGRESS NOTES
Chief Complaint:          Chief Complaint   Patient presents with   • Blood in Urine     1 wk f/u       HPI:   45 y.o. female.  Patient returns to clinic today with concerns of Gross hematuria and pelvic pressure/discomfort.     She strongly believes she has a kidney stone recurrence. She has had two ER visits this month alone, with repeated CT scans most recently last week on 03/19/2020  which showed No renal or ureteral stones, and No hydronephrosis.      Patient recently had dilation to 26 Irish, 5 days ago by Dr. Blunt secondary to severe urethral meatal stenosis.She reports she was extremely tight, since she had  not been dilated for several months since she lost her insurance.     Patient reports since then, she has been doing self cath 4-5x daily, as opposed to PRN only as instructed her last visit. She reports pain with this and She is requesting more narcotic pain medications. Her Urine output with each self cath is adequate. She states she does not measure it, but states she drinks a lot of fluids.    Her Urine dipstick today is completely negative for any infection. She has 3+microscopic hematuria.    She is still to follow up with OBGYN for her rising HCG levels she states.    Her Medical history is listed below and is significant...    Past Medical History:        Past Medical History:   Diagnosis Date   • Abdominal pain    • Abdominal swelling    • Hoyos's disease    • Bipolar 1 disorder (CMS/HCC)    • Brain tumor (benign) (CMS/HCC)    • Brain tumor (CMS/HCC)     R Frontal Lobe per pt   • Brain tumor (CMS/HCC) 2014   • Constipation    • DDD (degenerative disc disease), cervical 05/29/2017   • DDD (degenerative disc disease), cervical    • Diarrhea    • Fibromyalgia    • IBS (irritable bowel syndrome)    • Migraine    • Nausea & vomiting    • PONV (postoperative nausea and vomiting)    • PTSD (post-traumatic stress disorder)    • Rectal bleeding          The following portions of the patient's  history were reviewed and updated as appropriate: allergies, current medications, past family history, past medical history, past social history, past surgical history and problem list.    Current Meds:     Current Outpatient Medications   Medication Sig Dispense Refill   • albuterol (PROVENTIL HFA;VENTOLIN HFA) 108 (90 Base) MCG/ACT inhaler Inhale 2 puffs 2 (Two) Times a Day.     • Azelastine HCl 137 MCG/SPRAY solution 1 spray into each nostril As Needed (Allergies).     • busPIRone (BUSPAR) 15 MG tablet Take 15 mg by mouth 3 (three) times a day        • cefuroxime (CEFTIN) 500 MG tablet      • cetirizine (zyrTEC) 10 MG tablet Take 1 tablet by mouth Daily. 30 tablet 0   • cyclobenzaprine (FLEXERIL) 5 MG tablet      • diclofenac (VOLTAREN) 1 % gel gel      • divalproex (DEPAKOTE) 500 MG DR tablet Take 1 tablet by mouth 3 (Three) Times a Day. 90 tablet 2   • fluticasone (VERAMYST) 27.5 MCG/SPRAY nasal spray 2 sprays into each nostril Daily.     • montelukast (SINGULAIR) 10 MG tablet Take 10 mg by mouth Every Night.     • ondansetron (ZOFRAN) 4 MG tablet      • orphenadrine (NORFLEX) 100 MG 12 hr tablet Take 1 tablet by mouth 2 (Two) Times a Day. 20 tablet 0   • oxyCODONE (ROXICODONE) 5 MG immediate release tablet      • oxyCODONE-acetaminophen (Percocet)  MG per tablet Take 1 tablet by mouth Every 6 (Six) Hours As Needed for Moderate Pain . 8 tablet 0   • pantoprazole (PROTONIX) 40 MG EC tablet Take 40 mg by mouth 2 (Two) Times a Day As Needed.     • phenazopyridine (PYRIDIUM) 100 MG tablet      • promethazine (PHENERGAN) 25 MG tablet Take 1 tablet by mouth Every 6 (Six) Hours As Needed for Nausea or Vomiting. 10 tablet 0   • PROMETHEGAN 25 MG suppository      • sertraline (ZOLOFT) 100 MG tablet Take 100 mg by mouth 2 (Two) Times a Day.     • SUMAtriptan (IMITREX) 6 MG/0.5ML solution injection Inject 6 mg under the skin 1 (One) Time.     • tiZANidine (ZANAFLEX) 4 MG tablet        No current  facility-administered medications for this visit.         Allergies:      Allergies   Allergen Reactions   • Ativan [Lorazepam] Hallucinations     confusion   • Sulfa Antibiotics Shortness Of Breath and Swelling   • Sulfa Antibiotics Anaphylaxis   • Reglan [Metoclopramide] Angioedema   • Compazine [Prochlorperazine Edisylate] Hives   • Demerol [Meperidine] Hives   • Droperidol Itching   • Metoclopramide Swelling   • Toradol [Ketorolac Tromethamine] Hives and Itching   • Toradol [Ketorolac Tromethamine] Hives   • Zofran [Ondansetron Hcl] Rash   • Zosyn [Piperacillin Sod-Tazobactam So] Hives        Past Surgical History:     Past Surgical History:   Procedure Laterality Date   • ANAL SCOPE N/A 7/28/2016    Procedure: ANAL SCOPE;  Surgeon: Kael Lopez MD;  Location: [facility] OR;  Service:    • APPENDECTOMY     • COLONOSCOPY N/A 6/30/2016    Procedure: COLONOSCOPY  CPTCODE:17550;  Surgeon: Jose Antonio Belle III, MD;  Location: [facility] OR;  Service:    • COLONOSCOPY N/A 7/7/2016    Procedure: COLONOSCOPY (53319) CPT;  Surgeon: Jose Antonio Belle III, MD;  Location: [facility] OR;  Service:    • CYSTOSCOPY RETROGRADE PYELOGRAM Bilateral 4/28/2017    Procedure: CYSTOSCOPY RETROGRADE PYELOGRAM;  Surgeon: Mu Blunt MD;  Location: [facility] OR;  Service:    • ENDOSCOPY N/A 6/30/2016    Procedure: ESOPHAGOGASTRODUODENOSCOPY WITH BIOPSY  CPTCODE:34845;  Surgeon: Jose Antonio Belle III, MD;  Location: [facility] OR;  Service:    • HEMORRHOIDECTOMY N/A 7/28/2016    Procedure: HEMORRHOID STAPLING;  Surgeon: Kael Lopez MD;  Location: [facility] OR;  Service:    • HYSTERECTOMY     • KNEE SURGERY     • LAPAROSCOPIC SALPINGOOPHERECTOMY     • PORTACATH PLACEMENT N/A 7/28/2017    Procedure: INSERTION OF PORTACATH;  Surgeon: Celso Arredondo MD;  Location: [facility] OR;  Service:    • SHOULDER SURGERY      3 times         Social History:     Social History     Socioeconomic History   • Marital status:      Spouse name:  Not on file   • Number of children: Not on file   • Years of education: Not on file   • Highest education level: Not on file   Tobacco Use   • Smoking status: Former Smoker     Packs/day: 1.00     Years: 20.00     Pack years: 20.00     Last attempt to quit: 2015     Years since quittin.4   • Smokeless tobacco: Never Used   Substance and Sexual Activity   • Alcohol use: No   • Drug use: Yes     Types: Marijuana     Comment: for pain   • Sexual activity: Defer     Birth control/protection: Surgical   Social History Narrative    ** Merged History Encounter **         Lives in [place], works as a , independent of ADL       Family History:     Family History   Problem Relation Age of Onset   • Crohn's disease Other    • Hypertension Other    • Diabetes Other    • Irritable bowel syndrome Other    • No Known Problems Father    • No Known Problems Mother        Review of Systems:     Review of Systems   Constitutional: Negative for activity change, chills, fatigue and fever.   HENT: Negative for congestion and sinus pressure.    Eyes: Negative for blurred vision.   Respiratory: Negative for cough.    Cardiovascular: Negative for leg swelling.   Gastrointestinal: Positive for abdominal pain. Negative for nausea and vomiting.   Genitourinary: Positive for decreased urine volume, difficulty urinating, dysuria, flank pain, frequency, hematuria, pelvic pain and pelvic pressure. Negative for dyspareunia, genital sores, urgency, urinary incontinence and vaginal discharge.   Musculoskeletal: Negative for back pain.   Skin: Positive for dry skin and pallor.   Neurological: Negative for dizziness, headache and confusion.   Psychiatric/Behavioral: Positive for stress. Negative for behavioral problems and decreased concentration. The patient is nervous/anxious.         Physical Exam:     Physical Exam   Constitutional: She is oriented to person, place, and time. She appears well-developed and well-nourished. She appears  distressed.   HENT:   Head: Normocephalic and atraumatic.   Eyes: Pupils are equal, round, and reactive to light. EOM are normal.   Neck: Normal range of motion. No tracheal deviation present. No thyromegaly present.   Cardiovascular: Normal rate and regular rhythm.   No murmur heard.  Pulmonary/Chest: Effort normal and breath sounds normal. No stridor. No respiratory distress. She has no wheezes.   Abdominal: Soft. Bowel sounds are normal. There is tenderness.   Genitourinary: Vagina normal. There is no tenderness on the right labia. There is no tenderness on the left labia. No vaginal discharge found.   Genitourinary Comments: Pelvic pain/pressure/discomfort   Musculoskeletal: Normal range of motion. She exhibits tenderness. She exhibits no edema or deformity.   Neurological: She is alert and oriented to person, place, and time. No cranial nerve deficit or sensory deficit. Coordination normal.   Skin: Skin is warm and dry. Capillary refill takes less than 2 seconds. No rash noted. No erythema. There is pallor.   Psychiatric: She has a normal mood and affect. Her behavior is normal. Judgment and thought content normal.       Procedure:       Assessment/Plan:     Gross Hematuria/Pelvic pain : Patient Returns to clinic today with concerns of gross hematuria. She has LLQ pain, pelvic pain/pressure which has been bothersome to her. She has concerns about kidney stones. She  also recently had dilation due to severe meatal stricture. Since then she has been doing self caths 4-5x daily.     We both reviewed and discussed her Recent CT scan which is completely negative for stones, and does not show any explanation of her recent discomfort.    Discussed with patient and expalined to patient her self caths 4-5x daily,  when she can void intermittently might be causing irritation hence hematuria. She should only cath as needed, especially when urine output is >300cc.     She is to keep a daily Log of her intake/output with  self cath.    She is to keep a daily log of her Pelvic pain, intensity, triggers,  What makes it worse/better.    Also discussed Lower tract investigation with Cystoscopy since she has already had an upper teract with Negative CT scan.     She has been scheduled for Cystoscopy with Dr. Blunt.    She is to continue her Pyridium PRN, comfort measures with warm showers, warm/cold compresses to pelvic region as tolerated and also by alternating tylenol/motrim for discomfort.    Pt is agreeable to plan of care.      Patient reports that she is not currently experiencing any symptoms of urinary incontinence.      Patient's Body mass index is 15.65 kg/m². She reports recent weight gain, even though her BMI is below normal parameters. Recommendations include: none (medical contraindication).    Smoking Cessation Counseling:  Former smoker.Quit 2015  Patient does not currently use any tobacco products.     Counseling was given to patient for the following topics diagnostic results including: Gross hematuria, pelvic pain, instructions for management as follows: PRN in/out caths only, pyriduim PRN, increase po fluid intake, OBGYN follow up,  pain/nausea control,, risk factor reductions including: Avoiding bladder/urethral trauma with self caths 4-5x daily, avoiding bladder irritants such as caffiene products, and impressions as follows: Cystoscopy with Dr. Blunt ASAP.. The interim medical history and current results were reviewed.  A treatment plan with follow-up was made for pelvic pain, pressure,  Gross hematuria [R31.0].         This document has been electronically signed by Griselda Cheng-Akwa, APRN March 25, 2020 13:18

## 2020-03-25 NOTE — PATIENT INSTRUCTIONS
Hematuria, Adult  Hematuria is blood in the urine. Blood may be visible in the urine, or it may be identified with a test. This condition can be caused by infections of the bladder, urethra, kidney, or prostate. Other possible causes include:  · Kidney stones.  · Cancer of the urinary tract.  · Too much calcium in the urine.  · Conditions that are passed from parent to child (inherited conditions).  · Exercise that requires a lot of energy.  Infections can usually be treated with medicine, and a kidney stone usually will pass through your urine. If neither of these is the cause of your hematuria, more tests may be needed to identify the cause of your symptoms.  It is very important to tell your health care provider about any blood in your urine, even if it is painless or the blood stops without treatment. Blood in the urine, when it happens and then stops and then happens again, can be a symptom of a very serious condition, including cancer. There is no pain in the initial stages of many urinary cancers.  Follow these instructions at home:  Medicines  · Take over-the-counter and prescription medicines only as told by your health care provider.  · If you were prescribed an antibiotic medicine, take it as told by your health care provider. Do not stop taking the antibiotic even if you start to feel better.  Eating and drinking  · Drink enough fluid to keep your urine clear or pale yellow. It is recommended that you drink 3-4 quarts (2.8-3.8 L) a day. If you have been diagnosed with an infection, it is recommended that you drink cranberry juice in addition to large amounts of water.  · Avoid caffeine, tea, and carbonated beverages. These tend to irritate the bladder.  · Avoid alcohol because it may irritate the prostate (men).  General instructions  · If you have been diagnosed with a kidney stone, follow your health care provider's instructions about straining your urine to catch the stone.  · Empty your bladder  often. Avoid holding urine for long periods of time.  · If you are female:  ? After a bowel movement, wipe from front to back and use each piece of toilet paper only once.  ? Empty your bladder before and after sex.  · Pay attention to any changes in your symptoms. Tell your health care provider about any changes or any new symptoms.  · It is your responsibility to get your test results. Ask your health care provider, or the department performing the test, when your results will be ready.  · Keep all follow-up visits as told by your health care provider. This is important.  Contact a health care provider if:  · You develop back pain.  · You have a fever.  · You have nausea or vomiting.  · Your symptoms do not improve after 3 days.  · Your symptoms get worse.  Get help right away if:  · You develop severe vomiting and are unable take medicine without vomiting.  · You develop severe pain in your back or abdomen even though you are taking medicine.  · You pass a large amount of blood in your urine.  · You pass blood clots in your urine.  · You feel very weak or like you might faint.  · You faint.  Summary  · Hematuria is blood in the urine. It has many possible causes.  · It is very important that you tell your health care provider about any blood in your urine, even if it is painless or the blood stops without treatment.  · Take over-the-counter and prescription medicines only as told by your health care provider.  · Drink enough fluid to keep your urine clear or pale yellow.  This information is not intended to replace advice given to you by your health care provider. Make sure you discuss any questions you have with your health care provider.  Document Released: 12/18/2006 Document Revised: 01/20/2018 Document Reviewed: 01/20/2018  [employer] Interactive Patient Education © 2020 [employer]  Pelvic Pain, Female  Pelvic pain is pain in your lower belly (abdomen), below your belly button and between your hips. The  pain may start suddenly (be acute), keep coming back (be recurring), or last a long time (become chronic). Pelvic pain that lasts longer than 6 months is called chronic pelvic pain. There are many causes of pelvic pain. Sometimes the cause of pelvic pain is not known.  Follow these instructions at home:    · Take over-the-counter and prescription medicines only as told by your doctor.  · Rest as told by your doctor.  · Do not have sex if it hurts.  · Keep a journal of your pelvic pain. Write down:  ? When the pain started.  ? Where the pain is located.  ? What seems to make the pain better or worse, such as food or your period (menstrual cycle).  ? Any symptoms you have along with the pain.  · Keep all follow-up visits as told by your doctor. This is important.  Contact a doctor if:  · Medicine does not help your pain.  · Your pain comes back.  · You have new symptoms.  · You have unusual discharge or bleeding from your vagina.  · You have a fever or chills.  · You are having trouble pooping (constipation).  · You have blood in your pee (urine) or poop (stool).  · Your pee smells bad.  · You feel weak or light-headed.  Get help right away if:  · You have sudden pain that is very bad.  · Your pain keeps getting worse.  · You have very bad pain and also have any of these symptoms:  ? A fever.  ? Feeling sick to your stomach (nausea).  ? Throwing up (vomiting).  ? Being very sweaty.  · You pass out (lose consciousness).  Summary  · Pelvic pain is pain in your lower belly (abdomen), below your belly button and between your hips.  · There are many possible causes of pelvic pain.  · Keep a journal of your pelvic pain.  This information is not intended to replace advice given to you by your health care provider. Make sure you discuss any questions you have with your health care provider.  Document Released: 06/05/2009 Document Revised: 06/05/2019 Document Reviewed: 06/05/2019  [employer] Interactive Patient Education © 2020  [employer]

## 2020-04-03 ENCOUNTER — TELEPHONE (OUTPATIENT)
Dept: UROLOGY | Facility: CLINIC | Age: 45
End: 2020-04-03

## 2020-04-03 NOTE — TELEPHONE ENCOUNTER
Patient is calling today due to Urine is real red when she uses restroom she feels a lot of pressure on left side she states that it feels like a stabbing pain in left kidney she is saying she can not stand the pain she does not feel the ER wilkl do anything for her

## 2020-04-03 NOTE — TELEPHONE ENCOUNTER
"Called patient today regarding follow up on her urinary symptoms. She reports pelvic pain/pressure and gross hematuria secondary to trauma /irritation form in/out cath.    She reports improvement when she does not cath as often, so I have again ADVISED HER TO ONLY CATH AS NEEDED. PRN she states she is taking her pyridium and motrin as needed but will like narcotic pain meds.    Patient reports she is able to void, but believes she does not void as much. Discussed sanchez cath temporary but she refused.    Discussed going to ER if her symptoms become severe but she refuses saying \"they wont do anything for me\"    Discussed keeping her cystoscopy visit to clinic.    Discussed keeping her OBGYN VISIT NEXT WEEK.    She is to call with any more questions.  "

## 2020-04-09 ENCOUNTER — APPOINTMENT (OUTPATIENT)
Dept: CT IMAGING | Facility: HOSPITAL | Age: 45
End: 2020-04-09

## 2020-04-09 ENCOUNTER — HOSPITAL ENCOUNTER (EMERGENCY)
Facility: HOSPITAL | Age: 45
Discharge: HOME | End: 2020-04-09
Attending: FAMILY MEDICINE | Admitting: FAMILY MEDICINE

## 2020-04-09 VITALS
DIASTOLIC BLOOD PRESSURE: 76 MMHG | TEMPERATURE: 98 F | SYSTOLIC BLOOD PRESSURE: 121 MMHG | HEART RATE: 77 BPM | HEIGHT: 68 IN | WEIGHT: 120 LBS | RESPIRATION RATE: 18 BRPM | BODY MASS INDEX: 18.19 KG/M2 | OXYGEN SATURATION: 98 %

## 2020-04-09 DIAGNOSIS — R10.9 FLANK PAIN: ICD-10-CM

## 2020-04-09 DIAGNOSIS — R31.9 HEMATURIA, UNSPECIFIED TYPE: Primary | ICD-10-CM

## 2020-04-09 LAB
ALBUMIN SERPL-MCNC: 4.91 G/DL (ref 3.5–5.2)
ALBUMIN/GLOB SERPL: 1.8 G/DL
ALP SERPL-CCNC: 82 U/L (ref 39–117)
ALT SERPL W P-5'-P-CCNC: 80 U/L (ref 1–33)
ANION GAP SERPL CALCULATED.3IONS-SCNC: 13.1 MMOL/L (ref 5–15)
AST SERPL-CCNC: 57 U/L (ref 1–32)
BACTERIA UR QL AUTO: ABNORMAL /HPF
BASOPHILS # BLD AUTO: 0.01 10*3/MM3 (ref 0–0.2)
BASOPHILS NFR BLD AUTO: 0.2 % (ref 0–1.5)
BILIRUB SERPL-MCNC: 0.4 MG/DL (ref 0.2–1.2)
BILIRUB UR QL STRIP: NEGATIVE
BUN BLD-MCNC: 7 MG/DL (ref 6–20)
BUN/CREAT SERPL: 11.7 (ref 7–25)
CALCIUM SPEC-SCNC: 9.8 MG/DL (ref 8.6–10.5)
CHLORIDE SERPL-SCNC: 103 MMOL/L (ref 98–107)
CLARITY UR: ABNORMAL
CO2 SERPL-SCNC: 25.9 MMOL/L (ref 22–29)
COLOR UR: ABNORMAL
CREAT BLD-MCNC: 0.6 MG/DL (ref 0.57–1)
DEPRECATED RDW RBC AUTO: 46.5 FL (ref 37–54)
EOSINOPHIL # BLD AUTO: 0.1 10*3/MM3 (ref 0–0.4)
EOSINOPHIL NFR BLD AUTO: 2 % (ref 0.3–6.2)
ERYTHROCYTE [DISTWIDTH] IN BLOOD BY AUTOMATED COUNT: 13.3 % (ref 12.3–15.4)
GFR SERPL CREATININE-BSD FRML MDRD: 108 ML/MIN/1.73
GLOBULIN UR ELPH-MCNC: 2.8 GM/DL
GLUCOSE BLD-MCNC: 80 MG/DL (ref 65–99)
GLUCOSE UR STRIP-MCNC: NEGATIVE MG/DL
HCT VFR BLD AUTO: 48 % (ref 34–46.6)
HGB BLD-MCNC: 15.7 G/DL (ref 12–15.9)
HGB UR QL STRIP.AUTO: ABNORMAL
HYALINE CASTS UR QL AUTO: ABNORMAL /LPF
IMM GRANULOCYTES # BLD AUTO: 0.01 10*3/MM3 (ref 0–0.05)
IMM GRANULOCYTES NFR BLD AUTO: 0.2 % (ref 0–0.5)
KETONES UR QL STRIP: NEGATIVE
LEUKOCYTE ESTERASE UR QL STRIP.AUTO: ABNORMAL
LYMPHOCYTES # BLD AUTO: 1.59 10*3/MM3 (ref 0.7–3.1)
LYMPHOCYTES NFR BLD AUTO: 32.1 % (ref 19.6–45.3)
MCH RBC QN AUTO: 30.9 PG (ref 26.6–33)
MCHC RBC AUTO-ENTMCNC: 32.7 G/DL (ref 31.5–35.7)
MCV RBC AUTO: 94.5 FL (ref 79–97)
MONOCYTES # BLD AUTO: 0.26 10*3/MM3 (ref 0.1–0.9)
MONOCYTES NFR BLD AUTO: 5.2 % (ref 5–12)
NEUTROPHILS # BLD AUTO: 2.99 10*3/MM3 (ref 1.7–7)
NEUTROPHILS NFR BLD AUTO: 60.3 % (ref 42.7–76)
NITRITE UR QL STRIP: NEGATIVE
NRBC BLD AUTO-RTO: 0 /100 WBC (ref 0–0.2)
PH UR STRIP.AUTO: 8 [PH] (ref 5–8)
PLATELET # BLD AUTO: 147 10*3/MM3 (ref 140–450)
PMV BLD AUTO: 10.8 FL (ref 6–12)
POTASSIUM BLD-SCNC: 4.1 MMOL/L (ref 3.5–5.2)
PROT SERPL-MCNC: 7.7 G/DL (ref 6–8.5)
PROT UR QL STRIP: NEGATIVE
RBC # BLD AUTO: 5.08 10*6/MM3 (ref 3.77–5.28)
RBC # UR: ABNORMAL /HPF
REF LAB TEST METHOD: ABNORMAL
SODIUM BLD-SCNC: 142 MMOL/L (ref 136–145)
SP GR UR STRIP: 1.01 (ref 1–1.03)
SQUAMOUS #/AREA URNS HPF: ABNORMAL /HPF
UROBILINOGEN UR QL STRIP: ABNORMAL
WBC NRBC COR # BLD: 4.96 10*3/MM3 (ref 3.4–10.8)
WBC UR QL AUTO: ABNORMAL /HPF

## 2020-04-09 PROCEDURE — 81001 URINALYSIS AUTO W/SCOPE: CPT | Performed by: PHYSICIAN ASSISTANT

## 2020-04-09 PROCEDURE — 25010000002 PROMETHAZINE PER 50 MG: Performed by: PHYSICIAN ASSISTANT

## 2020-04-09 PROCEDURE — 74176 CT ABD & PELVIS W/O CONTRAST: CPT

## 2020-04-09 PROCEDURE — 85025 COMPLETE CBC W/AUTO DIFF WBC: CPT | Performed by: PHYSICIAN ASSISTANT

## 2020-04-09 PROCEDURE — 63710000001 PROMETHAZINE PER 25 MG: Performed by: PHYSICIAN ASSISTANT

## 2020-04-09 PROCEDURE — 96372 THER/PROPH/DIAG INJ SC/IM: CPT

## 2020-04-09 PROCEDURE — 80053 COMPREHEN METABOLIC PANEL: CPT | Performed by: PHYSICIAN ASSISTANT

## 2020-04-09 PROCEDURE — 74176 CT ABD & PELVIS W/O CONTRAST: CPT | Performed by: RADIOLOGY

## 2020-04-09 PROCEDURE — 99284 EMERGENCY DEPT VISIT MOD MDM: CPT

## 2020-04-09 RX ORDER — ONDANSETRON 2 MG/ML
4 INJECTION INTRAMUSCULAR; INTRAVENOUS ONCE
Status: DISCONTINUED | OUTPATIENT
Start: 2020-04-09 | End: 2020-04-09

## 2020-04-09 RX ORDER — SODIUM CHLORIDE 0.9 % (FLUSH) 0.9 %
10 SYRINGE (ML) INJECTION AS NEEDED
Status: DISCONTINUED | OUTPATIENT
Start: 2020-04-09 | End: 2020-04-09

## 2020-04-09 RX ORDER — ACETAMINOPHEN AND CODEINE PHOSPHATE 300; 30 MG/1; MG/1
1 TABLET ORAL EVERY 6 HOURS PRN
Qty: 12 TABLET | Refills: 0 | OUTPATIENT
Start: 2020-04-09 | End: 2021-06-20

## 2020-04-09 RX ORDER — PROMETHAZINE HYDROCHLORIDE 12.5 MG/1
12.5 TABLET ORAL EVERY 6 HOURS PRN
Qty: 12 TABLET | Refills: 0 | Status: SHIPPED | OUTPATIENT
Start: 2020-04-09 | End: 2020-05-04 | Stop reason: SDUPTHER

## 2020-04-09 RX ORDER — PROMETHAZINE HYDROCHLORIDE 25 MG/1
12.5 TABLET ORAL ONCE
Status: COMPLETED | OUTPATIENT
Start: 2020-04-09 | End: 2020-04-09

## 2020-04-09 RX ORDER — PROMETHAZINE HYDROCHLORIDE 25 MG/ML
12.5 INJECTION, SOLUTION INTRAMUSCULAR; INTRAVENOUS ONCE
Status: COMPLETED | OUTPATIENT
Start: 2020-04-09 | End: 2020-04-09

## 2020-04-09 RX ORDER — POLYETHYLENE GLYCOL 3350 17 G/17G
17 POWDER, FOR SOLUTION ORAL DAILY
Qty: 289 G | Refills: 0 | OUTPATIENT
Start: 2020-04-09 | End: 2021-06-20

## 2020-04-09 RX ADMIN — PROMETHAZINE HYDROCHLORIDE 12.5 MG: 25 TABLET ORAL at 15:35

## 2020-04-09 RX ADMIN — PROMETHAZINE HYDROCHLORIDE 12.5 MG: 25 INJECTION, SOLUTION INTRAMUSCULAR; INTRAVENOUS at 18:02

## 2020-04-09 NOTE — ED PROVIDER NOTES
Subjective   Pt called dr gray [facility] and they told her to come here   Pt has long hx of kidney stones feels like she has one   Left sided flank pain, cant keep down fluids       History provided by:  Patient  Flank Pain   Pain quality: dull    Pain radiates to:  Does not radiate  Pain severity:  Mild  Onset quality:  Sudden  Duration:  1 day  Timing:  Constant  Progression:  Worsening  Chronicity:  New  Relieved by:  Nothing  Worsened by:  Urination and vomiting  Ineffective treatments:  None tried  Associated symptoms: no chest pain, no chills, no cough, no diarrhea, no dysuria, no fever, no nausea, no shortness of breath, no sore throat and no vomiting        Review of Systems   Constitutional: Negative for chills and fever.   HENT: Negative for congestion, ear pain and sore throat.    Respiratory: Negative for cough, shortness of breath and wheezing.    Cardiovascular: Negative for chest pain.   Gastrointestinal: Negative for diarrhea, nausea and vomiting.   Genitourinary: Negative for dysuria and flank pain.   Skin: Negative for rash.   Neurological: Negative for headaches.   Psychiatric/Behavioral: The patient is not nervous/anxious.    All other systems reviewed and are negative.      Past Medical History:   Diagnosis Date   • Abdominal pain    • Abdominal swelling    • Hoyos's disease    • Bipolar 1 disorder (CMS/HCC)    • Brain tumor (benign) (CMS/HCC)    • Brain tumor (CMS/HCC)     R Frontal Lobe per pt   • Brain tumor (CMS/HCC) 2014   • Constipation    • DDD (degenerative disc disease), cervical 05/29/2017   • DDD (degenerative disc disease), cervical    • Diarrhea    • Fibromyalgia    • IBS (irritable bowel syndrome)    • Migraine    • Nausea & vomiting    • PONV (postoperative nausea and vomiting)    • PTSD (post-traumatic stress disorder)    • Rectal bleeding        Allergies   Allergen Reactions   • Ativan [Lorazepam] Hallucinations     confusion   • Sulfa Antibiotics Shortness Of Breath and  Swelling   • Sulfa Antibiotics Anaphylaxis   • Reglan [Metoclopramide] Angioedema   • Compazine [Prochlorperazine Edisylate] Hives   • Demerol [Meperidine] Hives   • Droperidol Itching   • Metoclopramide Swelling   • Toradol [Ketorolac Tromethamine] Hives and Itching   • Toradol [Ketorolac Tromethamine] Hives   • Zofran [Ondansetron Hcl] Rash   • Zosyn [Piperacillin Sod-Tazobactam So] Hives       Past Surgical History:   Procedure Laterality Date   • ANAL SCOPE N/A 7/28/2016    Procedure: ANAL SCOPE;  Surgeon: Kael Lopez MD;  Location: [facility] OR;  Service:    • APPENDECTOMY     • COLONOSCOPY N/A 6/30/2016    Procedure: COLONOSCOPY  CPTCODE:22911;  Surgeon: Jose Antonio Belle III, MD;  Location: [facility] OR;  Service:    • COLONOSCOPY N/A 7/7/2016    Procedure: COLONOSCOPY (18037) CPT;  Surgeon: Jose Antonio Belle III, MD;  Location: [facility] OR;  Service:    • CYSTOSCOPY RETROGRADE PYELOGRAM Bilateral 4/28/2017    Procedure: CYSTOSCOPY RETROGRADE PYELOGRAM;  Surgeon: Mu Blunt MD;  Location: [facility] OR;  Service:    • ENDOSCOPY N/A 6/30/2016    Procedure: ESOPHAGOGASTRODUODENOSCOPY WITH BIOPSY  CPTCODE:80478;  Surgeon: Jose Antonio Belle III, MD;  Location: [facility] OR;  Service:    • HEMORRHOIDECTOMY N/A 7/28/2016    Procedure: HEMORRHOID STAPLING;  Surgeon: Kael Lopez MD;  Location: [facility] OR;  Service:    • HYSTERECTOMY     • KNEE SURGERY     • LAPAROSCOPIC SALPINGOOPHERECTOMY     • PORTACATH PLACEMENT N/A 7/28/2017    Procedure: INSERTION OF PORTACATH;  Surgeon: Celso Arredondo MD;  Location: [facility] OR;  Service:    • SHOULDER SURGERY      3 times       Family History   Problem Relation Age of Onset   • Crohn's disease Other    • Hypertension Other    • Diabetes Other    • Irritable bowel syndrome Other    • No Known Problems Father    • No Known Problems Mother        Social History     Socioeconomic History   • Marital status:      Spouse name: Not on file   • Number of children:  Not on file   • Years of education: Not on file   • Highest education level: Not on file   Tobacco Use   • Smoking status: Former Smoker     Packs/day: 1.00     Years: 20.00     Pack years: 20.00     Last attempt to quit: 2015     Years since quittin.4   • Smokeless tobacco: Never Used   Substance and Sexual Activity   • Alcohol use: No   • Drug use: Yes     Types: Marijuana     Comment: for pain   • Sexual activity: Defer     Birth control/protection: Surgical   Social History Narrative    ** Merged History Encounter **         Lives in [place], works as a , independent of ADL           Objective   Physical Exam   Constitutional: She is oriented to person, place, and time. She appears well-developed and well-nourished. No distress.   HENT:   Head: Normocephalic and atraumatic.   Right Ear: External ear normal.   Left Ear: External ear normal.   Nose: Nose normal.   Eyes: Pupils are equal, round, and reactive to light. Conjunctivae and EOM are normal.   Neck: Normal range of motion. Neck supple. No JVD present. No tracheal deviation present.   Cardiovascular: Normal rate, regular rhythm and normal heart sounds.   No murmur heard.  Pulmonary/Chest: Effort normal and breath sounds normal. No respiratory distress. She has no wheezes.   Abdominal: Soft. Bowel sounds are normal. There is tenderness in the left lower quadrant.   Musculoskeletal: Normal range of motion. She exhibits no edema or deformity.   Neurological: She is alert and oriented to person, place, and time. No cranial nerve deficit.   Skin: Skin is warm and dry. No rash noted. She is not diaphoretic. No erythema. No pallor.   Psychiatric: She has a normal mood and affect. Her behavior is normal. Thought content normal.   Nursing note and vitals reviewed.      Procedures           ED Course                                           MDM    Final diagnoses:   Hematuria, unspecified type   Flank pain            Xenia Garza PA  [phone]  [phone]

## 2020-04-09 NOTE — ED TRIAGE NOTES
Pt reassessed and made aware would be called to room as soon as available, ER experiencing a high volume of high acuity pt's at this time, pt continues to rate flank pain 7/10; will continue to monitor while in lobby.

## 2020-04-09 NOTE — ED NOTES
Discharge instructions reviewed with patient, patient instructed to return to ED if symptoms worsen or if any new problems arise. Patient verbalizes understanding of discharge instructions, patient ambulatory out of ED. No acute distress noted.     Tommy Easley RN  04/09/20 [id number]

## 2020-04-20 ENCOUNTER — HOSPITAL ENCOUNTER (EMERGENCY)
Facility: HOSPITAL | Age: 45
Discharge: HOME | End: 2020-04-20
Attending: EMERGENCY MEDICINE | Admitting: EMERGENCY MEDICINE

## 2020-04-20 VITALS
TEMPERATURE: 98.2 F | OXYGEN SATURATION: 100 % | DIASTOLIC BLOOD PRESSURE: 72 MMHG | RESPIRATION RATE: 18 BRPM | WEIGHT: 124 LBS | SYSTOLIC BLOOD PRESSURE: 128 MMHG | HEIGHT: 68 IN | BODY MASS INDEX: 18.79 KG/M2 | HEART RATE: 74 BPM

## 2020-04-20 DIAGNOSIS — G43.909 MIGRAINE WITHOUT STATUS MIGRAINOSUS, NOT INTRACTABLE, UNSPECIFIED MIGRAINE TYPE: Primary | ICD-10-CM

## 2020-04-20 PROCEDURE — 99283 EMERGENCY DEPT VISIT LOW MDM: CPT

## 2020-04-20 PROCEDURE — 25010000002 PROMETHAZINE PER 50 MG: Performed by: EMERGENCY MEDICINE

## 2020-04-20 PROCEDURE — 96375 TX/PRO/DX INJ NEW DRUG ADDON: CPT

## 2020-04-20 PROCEDURE — 25010000002 HYDROMORPHONE 1 MG/ML SOLUTION: Performed by: EMERGENCY MEDICINE

## 2020-04-20 PROCEDURE — 96374 THER/PROPH/DIAG INJ IV PUSH: CPT

## 2020-04-20 RX ORDER — SODIUM CHLORIDE 0.9 % (FLUSH) 0.9 %
10 SYRINGE (ML) INJECTION AS NEEDED
Status: DISCONTINUED | OUTPATIENT
Start: 2020-04-20 | End: 2020-04-20 | Stop reason: HOSPADM

## 2020-04-20 RX ORDER — PROMETHAZINE HYDROCHLORIDE 25 MG/ML
12.5 INJECTION, SOLUTION INTRAMUSCULAR; INTRAVENOUS ONCE
Status: COMPLETED | OUTPATIENT
Start: 2020-04-20 | End: 2020-04-20

## 2020-04-20 RX ADMIN — PROMETHAZINE HYDROCHLORIDE 12.5 MG: 25 INJECTION INTRAMUSCULAR; INTRAVENOUS at 15:39

## 2020-04-20 RX ADMIN — SODIUM CHLORIDE 1000 ML: 9 INJECTION, SOLUTION INTRAVENOUS at 15:37

## 2020-04-20 RX ADMIN — HYDROMORPHONE HYDROCHLORIDE 1 MG: 1 INJECTION, SOLUTION INTRAMUSCULAR; INTRAVENOUS; SUBCUTANEOUS at 15:40

## 2020-04-20 NOTE — ED PROVIDER NOTES
Subjective   45-year-old white female presents secondary to headache.  Patient states this is a typical migraine for her.  This started 3 to 4 days ago.  She denies any head injury.  No fever.  No exposure COVID-19.  States that she has had nausea and vomiting that has been taking some medication at home.  She has had extensive work-ups for migraines in the past.  No other complaints at this time.          Review of Systems   Constitutional: Negative.  Negative for fever.   HENT: Negative.    Respiratory: Negative.    Cardiovascular: Negative.  Negative for chest pain.   Gastrointestinal: Negative.  Negative for abdominal pain.   Endocrine: Negative.    Genitourinary: Negative.  Negative for dysuria.   Skin: Negative.    Neurological: Negative.    Psychiatric/Behavioral: Negative.    All other systems reviewed and are negative.      Past Medical History:   Diagnosis Date   • Abdominal pain    • Abdominal swelling    • Hoyos's disease    • Bipolar 1 disorder (CMS/HCC)    • Brain tumor (benign) (CMS/HCC)    • Brain tumor (CMS/HCC)     R Frontal Lobe per pt   • Brain tumor (CMS/HCC) 2014   • Constipation    • DDD (degenerative disc disease), cervical 05/29/2017   • DDD (degenerative disc disease), cervical    • Diarrhea    • Fibromyalgia    • IBS (irritable bowel syndrome)    • Migraine    • Nausea & vomiting    • PONV (postoperative nausea and vomiting)    • PTSD (post-traumatic stress disorder)    • Rectal bleeding        Allergies   Allergen Reactions   • Ativan [Lorazepam] Hallucinations     confusion   • Sulfa Antibiotics Shortness Of Breath and Swelling   • Sulfa Antibiotics Anaphylaxis   • Reglan [Metoclopramide] Angioedema   • Compazine [Prochlorperazine Edisylate] Hives   • Demerol [Meperidine] Hives   • Droperidol Itching   • Metoclopramide Swelling   • Toradol [Ketorolac Tromethamine] Hives and Itching   • Toradol [Ketorolac Tromethamine] Hives   • Zofran [Ondansetron Hcl] Rash   • Zosyn [Piperacillin  Sod-Tazobactam So] Hives       Past Surgical History:   Procedure Laterality Date   • ANAL SCOPE N/A 2016    Procedure: ANAL SCOPE;  Surgeon: Kael Lopez MD;  Location: [facility] OR;  Service:    • APPENDECTOMY     • COLONOSCOPY N/A 2016    Procedure: COLONOSCOPY  CPTCODE:38242;  Surgeon: Jose Antonio Belle III, MD;  Location: [facility] OR;  Service:    • COLONOSCOPY N/A 2016    Procedure: COLONOSCOPY (54137) CPT;  Surgeon: Jose Antonio Belle III, MD;  Location: [facility] OR;  Service:    • CYSTOSCOPY RETROGRADE PYELOGRAM Bilateral 2017    Procedure: CYSTOSCOPY RETROGRADE PYELOGRAM;  Surgeon: Mu Blunt MD;  Location: [facility] OR;  Service:    • ENDOSCOPY N/A 2016    Procedure: ESOPHAGOGASTRODUODENOSCOPY WITH BIOPSY  CPTCODE:32046;  Surgeon: Jose Antonio Belle III, MD;  Location: [facility] OR;  Service:    • HEMORRHOIDECTOMY N/A 2016    Procedure: HEMORRHOID STAPLING;  Surgeon: Kael Lopez MD;  Location: [facility] OR;  Service:    • HYSTERECTOMY     • KNEE SURGERY     • LAPAROSCOPIC SALPINGOOPHERECTOMY     • PORTACATH PLACEMENT N/A 2017    Procedure: INSERTION OF PORTACATH;  Surgeon: Celso Arredondo MD;  Location: [facility] OR;  Service:    • SHOULDER SURGERY      3 times       Family History   Problem Relation Age of Onset   • Crohn's disease Other    • Hypertension Other    • Diabetes Other    • Irritable bowel syndrome Other    • No Known Problems Father    • No Known Problems Mother        Social History     Socioeconomic History   • Marital status:      Spouse name: Not on file   • Number of children: Not on file   • Years of education: Not on file   • Highest education level: Not on file   Tobacco Use   • Smoking status: Former Smoker     Packs/day: 1.00     Years: 20.00     Pack years: 20.00     Last attempt to quit: 2015     Years since quittin.4   • Smokeless tobacco: Never Used   Substance and Sexual Activity   • Alcohol use: No   • Drug use: Yes      Types: Marijuana     Comment: for pain   • Sexual activity: Defer     Birth control/protection: Surgical   Social History Narrative    ** Merged History Encounter **         Lives in [place], works as a , independent of ADL           Objective   Physical Exam   Constitutional: She is oriented to person, place, and time. She appears well-developed and well-nourished. No distress.   HENT:   Head: Normocephalic and atraumatic.   Right Ear: External ear normal.   Left Ear: External ear normal.   Nose: Nose normal.   Eyes: Pupils are equal, round, and reactive to light. Conjunctivae and EOM are normal.   Neck: Normal range of motion. Neck supple. No JVD present. No tracheal deviation present.   Cardiovascular: Normal rate, regular rhythm and normal heart sounds.   No murmur heard.  Pulmonary/Chest: Effort normal and breath sounds normal. No respiratory distress. She has no wheezes.   Abdominal: Soft. Bowel sounds are normal. There is no tenderness.   Musculoskeletal: Normal range of motion. She exhibits no edema or deformity.   Neurological: She is alert and oriented to person, place, and time. No cranial nerve deficit.   Skin: Skin is warm and dry. No rash noted. She is not diaphoretic. No erythema. No pallor.   Psychiatric: She has a normal mood and affect. Her behavior is normal. Thought content normal.   Nursing note and vitals reviewed.      Procedures           ED Course                                           MDM  Number of Diagnoses or Management Options  Migraine without status migrainosus, not intractable, unspecified migraine type: new and requires workup     Amount and/or Complexity of Data Reviewed  Clinical lab tests: ordered and reviewed  Discuss the patient with other providers: yes    Risk of Complications, Morbidity, and/or Mortality  Presenting problems: moderate        Final diagnoses:   Migraine without status migrainosus, not intractable, unspecified migraine type            Girish Morales,  PA  04/20/20 [id number]

## 2020-05-04 ENCOUNTER — OFFICE VISIT (OUTPATIENT)
Dept: UROLOGY | Facility: CLINIC | Age: 45
End: 2020-05-04

## 2020-05-04 VITALS — HEIGHT: 68 IN | BODY MASS INDEX: 18.85 KG/M2

## 2020-05-04 DIAGNOSIS — N35.028 OTHER POST-TRAUMATIC URETHRAL STRICTURE, FEMALE: ICD-10-CM

## 2020-05-04 DIAGNOSIS — R31.9 URINARY TRACT INFECTION WITH HEMATURIA, SITE UNSPECIFIED: ICD-10-CM

## 2020-05-04 DIAGNOSIS — N39.0 URINARY TRACT INFECTION WITH HEMATURIA, SITE UNSPECIFIED: ICD-10-CM

## 2020-05-04 DIAGNOSIS — R31.0 GROSS HEMATURIA: ICD-10-CM

## 2020-05-04 DIAGNOSIS — Z48.816 AFTERCARE FOLLOWING SURGERY OF THE GENITOURINARY SYSTEM: ICD-10-CM

## 2020-05-04 DIAGNOSIS — R30.0 DYSURIA: Primary | ICD-10-CM

## 2020-05-04 LAB
BILIRUB BLD-MCNC: NEGATIVE MG/DL
CLARITY, POC: ABNORMAL
COLOR UR: YELLOW
GLUCOSE UR STRIP-MCNC: NEGATIVE MG/DL
KETONES UR QL: NEGATIVE
LEUKOCYTE EST, POC: NEGATIVE
NITRITE UR-MCNC: NEGATIVE MG/ML
PH UR: 7.5 [PH] (ref 5–8)
PROT UR STRIP-MCNC: NEGATIVE MG/DL
RBC # UR STRIP: ABNORMAL /UL
SP GR UR: 1.01 (ref 1–1.03)
UROBILINOGEN UR QL: NORMAL

## 2020-05-04 PROCEDURE — 81003 URINALYSIS AUTO W/O SCOPE: CPT | Performed by: UROLOGY

## 2020-05-04 PROCEDURE — 53661 DILATION OF URETHRA: CPT | Performed by: UROLOGY

## 2020-05-04 PROCEDURE — 96372 THER/PROPH/DIAG INJ SC/IM: CPT | Performed by: UROLOGY

## 2020-05-04 RX ORDER — OXYCODONE AND ACETAMINOPHEN 10; 325 MG/1; MG/1
1 TABLET ORAL EVERY 6 HOURS PRN
Qty: 8 TABLET | Refills: 0 | Status: SHIPPED | OUTPATIENT
Start: 2020-05-04 | End: 2020-05-19 | Stop reason: SDUPTHER

## 2020-05-04 RX ORDER — TRAMADOL HYDROCHLORIDE 50 MG/1
TABLET ORAL
COMMUNITY
Start: 2020-04-16

## 2020-05-04 RX ORDER — GENTAMICIN SULFATE 40 MG/ML
80 INJECTION, SOLUTION INTRAMUSCULAR; INTRAVENOUS ONCE
Status: COMPLETED | OUTPATIENT
Start: 2020-05-04 | End: 2020-05-04

## 2020-05-04 RX ORDER — PROMETHAZINE HYDROCHLORIDE 25 MG/1
TABLET ORAL
COMMUNITY
Start: 2020-04-23 | End: 2021-02-17 | Stop reason: SDUPTHER

## 2020-05-04 RX ADMIN — GENTAMICIN SULFATE 80 MG: 40 INJECTION, SOLUTION INTRAMUSCULAR; INTRAVENOUS at 13:18

## 2020-05-04 NOTE — PROGRESS NOTES
Chief Complaint:          Chief Complaint   Patient presents with   • Difficulty Urinating   • Blood in Urine     2 mnth f/u       HPI:   45 y.o. female returns today accompanied by her  she is not been seen for several months because she lost her insurance.  She is lost an excessive amount of weight she looks almost moribund.  She states she is actually gained 15 pounds from her current weight of 110 pounds.  She has severe urethral meatal stenosis and is requesting dilation today she was also told the [facility] that she had a rising hCG and I strongly recommended she follow-up with the her gynecologist in [place].  As any absence of uterus and ovaries this may be representative of a tumor.  Nonetheless, I am going to recommend dilation after unremarkable prep and drape I dilated it was extremely tight to 26 French      Past Medical History:        Past Medical History:   Diagnosis Date   • Abdominal pain    • Abdominal swelling    • Hoyos's disease    • Bipolar 1 disorder (CMS/HCC)    • Brain tumor (benign) (CMS/HCC)    • Brain tumor (CMS/HCC)     R Frontal Lobe per pt   • Brain tumor (CMS/HCC) 2014   • Constipation    • DDD (degenerative disc disease), cervical 05/29/2017   • DDD (degenerative disc disease), cervical    • Diarrhea    • Fibromyalgia    • IBS (irritable bowel syndrome)    • Migraine    • Nausea & vomiting    • PONV (postoperative nausea and vomiting)    • PTSD (post-traumatic stress disorder)    • Rectal bleeding          Current Meds:     Current Outpatient Medications   Medication Sig Dispense Refill   • acetaminophen-codeine (TYLENOL #3) 300-30 MG per tablet Take 1 tablet by mouth Every 6 (Six) Hours As Needed for Moderate Pain . 12 tablet 0   • albuterol (PROVENTIL HFA;VENTOLIN HFA) 108 (90 Base) MCG/ACT inhaler Inhale 2 puffs 2 (Two) Times a Day.     • Azelastine HCl 137 MCG/SPRAY solution 1 spray into each nostril As Needed (Allergies).     • busPIRone (BUSPAR) 15 MG  tablet Take 15 mg by mouth 3 (three) times a day        • cefuroxime (CEFTIN) 500 MG tablet      • cetirizine (zyrTEC) 10 MG tablet Take 1 tablet by mouth Daily. 30 tablet 0   • cyclobenzaprine (FLEXERIL) 5 MG tablet      • diclofenac (VOLTAREN) 1 % gel gel      • divalproex (DEPAKOTE) 500 MG DR tablet Take 1 tablet by mouth 3 (Three) Times a Day. 90 tablet 2   • fluticasone (VERAMYST) 27.5 MCG/SPRAY nasal spray 2 sprays into each nostril Daily.     • montelukast (SINGULAIR) 10 MG tablet Take 10 mg by mouth Every Night.     • ondansetron (ZOFRAN) 4 MG tablet      • orphenadrine (NORFLEX) 100 MG 12 hr tablet Take 1 tablet by mouth 2 (Two) Times a Day. 20 tablet 0   • oxyCODONE (ROXICODONE) 5 MG immediate release tablet      • oxyCODONE-acetaminophen (Percocet)  MG per tablet Take 1 tablet by mouth Every 6 (Six) Hours As Needed for Moderate Pain . 8 tablet 0   • pantoprazole (PROTONIX) 40 MG EC tablet Take 40 mg by mouth 2 (Two) Times a Day As Needed.     • phenazopyridine (PYRIDIUM) 100 MG tablet      • polyethylene glycol (MIRALAX) powder Take 17 g by mouth Daily. 289 g 0   • promethazine (PHENERGAN) 25 MG tablet      • sertraline (ZOLOFT) 100 MG tablet Take 100 mg by mouth 2 (Two) Times a Day.     • SUMAtriptan (IMITREX) 6 MG/0.5ML solution injection Inject 6 mg under the skin 1 (One) Time.     • tiZANidine (ZANAFLEX) 4 MG tablet      • traMADol (ULTRAM) 50 MG tablet        No current facility-administered medications for this visit.         Allergies:      Allergies   Allergen Reactions   • Ativan [Lorazepam] Hallucinations     confusion   • Sulfa Antibiotics Shortness Of Breath and Swelling   • Sulfa Antibiotics Anaphylaxis   • Reglan [Metoclopramide] Angioedema   • Compazine [Prochlorperazine Edisylate] Hives   • Demerol [Meperidine] Hives   • Droperidol Itching   • Metoclopramide Swelling   • Toradol [Ketorolac Tromethamine] Hives and Itching   • Toradol [Ketorolac Tromethamine] Hives   • Zofran  [Ondansetron Hcl] Rash   • Zosyn [Piperacillin Sod-Tazobactam So] Hives        Past Surgical History:     Past Surgical History:   Procedure Laterality Date   • ANAL SCOPE N/A 2016    Procedure: ANAL SCOPE;  Surgeon: Kael Lopez MD;  Location: [facility] OR;  Service:    • APPENDECTOMY     • COLONOSCOPY N/A 2016    Procedure: COLONOSCOPY  CPTCODE:27561;  Surgeon: Jose Antonio Belle III, MD;  Location: [facility] OR;  Service:    • COLONOSCOPY N/A 2016    Procedure: COLONOSCOPY (40212) CPT;  Surgeon: Jose Antonio Belle III, MD;  Location: [facility] OR;  Service:    • CYSTOSCOPY RETROGRADE PYELOGRAM Bilateral 2017    Procedure: CYSTOSCOPY RETROGRADE PYELOGRAM;  Surgeon: Mu Blunt MD;  Location: [facility] OR;  Service:    • ENDOSCOPY N/A 2016    Procedure: ESOPHAGOGASTRODUODENOSCOPY WITH BIOPSY  CPTCODE:42747;  Surgeon: Jose Antonio Belle III, MD;  Location: [facility] OR;  Service:    • HEMORRHOIDECTOMY N/A 2016    Procedure: HEMORRHOID STAPLING;  Surgeon: Kael Lopez MD;  Location: [facility] OR;  Service:    • HYSTERECTOMY     • KNEE SURGERY     • LAPAROSCOPIC SALPINGOOPHERECTOMY     • PORTACATH PLACEMENT N/A 2017    Procedure: INSERTION OF PORTACATH;  Surgeon: Celso Arredondo MD;  Location: [facility] OR;  Service:    • SHOULDER SURGERY      3 times         Social History:     Social History     Socioeconomic History   • Marital status:      Spouse name: Not on file   • Number of children: Not on file   • Years of education: Not on file   • Highest education level: Not on file   Tobacco Use   • Smoking status: Former Smoker     Packs/day: 1.00     Years: 20.00     Pack years: 20.00     Last attempt to quit: 2015     Years since quittin.5   • Smokeless tobacco: Never Used   Substance and Sexual Activity   • Alcohol use: No   • Drug use: Yes     Types: Marijuana     Comment: for pain   • Sexual activity: Defer     Birth control/protection: Surgical   Social History  Narrative    ** Merged History Encounter **         Lives in [place], works as a , independent of ADL       Family History:     Family History   Problem Relation Age of Onset   • Crohn's disease Other    • Hypertension Other    • Diabetes Other    • Irritable bowel syndrome Other    • No Known Problems Father    • No Known Problems Mother        Review of Systems:     Review of Systems   Constitutional: Negative.  Negative for activity change, appetite change, chills, diaphoresis, fatigue and unexpected weight change.   HENT: Negative for congestion, dental problem, drooling, ear discharge, ear pain, facial swelling, hearing loss, mouth sores, nosebleeds, postnasal drip, rhinorrhea, sinus pressure, sneezing, sore throat, tinnitus, trouble swallowing and voice change.    Eyes: Negative.  Negative for photophobia, pain, discharge, redness, itching and visual disturbance.   Respiratory: Negative.  Negative for apnea, cough, choking, chest tightness, shortness of breath, wheezing and stridor.    Cardiovascular: Negative.  Negative for chest pain, palpitations and leg swelling.   Gastrointestinal: Negative.  Negative for abdominal distention, abdominal pain, anal bleeding, blood in stool, constipation, diarrhea, nausea, rectal pain and vomiting.   Endocrine: Negative.  Negative for cold intolerance, heat intolerance, polydipsia, polyphagia and polyuria.   Genitourinary: Positive for difficulty urinating.   Musculoskeletal: Negative.  Negative for arthralgias, back pain, gait problem, joint swelling, myalgias, neck pain and neck stiffness.   Skin: Negative.  Negative for color change, pallor, rash and wound.   Allergic/Immunologic: Negative.  Negative for environmental allergies, food allergies and immunocompromised state.   Neurological: Negative.  Negative for dizziness, tremors, seizures, syncope, facial asymmetry, speech difficulty, weakness, light-headedness, numbness and headaches.   Hematological: Negative.   Negative for adenopathy. Does not bruise/bleed easily.   Psychiatric/Behavioral: Negative for agitation, behavioral problems, confusion, decreased concentration, dysphoric mood, hallucinations, self-injury, sleep disturbance and suicidal ideas. The patient is not nervous/anxious and is not hyperactive.    All other systems reviewed and are negative.      Physical Exam:     Physical Exam   Constitutional: She appears well-developed and well-nourished.   HENT:   Head: Normocephalic and atraumatic.   Right Ear: External ear normal.   Left Ear: External ear normal.   Mouth/Throat: Oropharynx is clear and moist.   Eyes: Pupils are equal, round, and reactive to light. Conjunctivae are normal.   Cardiovascular: Normal rate, regular rhythm, normal heart sounds and intact distal pulses.   Pulmonary/Chest: Effort normal and breath sounds normal.   Abdominal: Soft. Bowel sounds are normal. She exhibits no distension and no mass. There is no tenderness. There is no rebound and no guarding.   Genitourinary: Vagina normal. No vaginal discharge found.   Musculoskeletal: Normal range of motion.   Neurological: She is alert. She has normal reflexes.   Skin: Skin is warm and dry.   Psychiatric: She has a normal mood and affect. Her behavior is normal. Judgment and thought content normal.       I have reviewed the following portions of the patient's history: allergies, current medications, past family history, past medical history, past social history, past surgical history, problem list and ROS and confirm it's accurate.      Procedure:     urethral dilation-after an appropriate informed consent the patient was brought to the procedure suite.  The urethra was gently anesthetized with 10 cc of 2% viscous Xylocaine jelly.  After an adequate period of topical anesthesia I went ahead and the urethra was gently anesthetized and dilated with Reese sounds from 16-26 [place]i sequentially without complication the patient was given  gentamicin as prophylaxis with 80 mg  Assessment/Plan:   Urethral stricture-status post successful dilation            Patient's Body mass index is 18.85 kg/m². BMI is within normal parameters. No follow-up required..              This document has been electronically signed by VERENICE FINK MD May 4, 2020 13:07

## 2020-05-05 ENCOUNTER — TELEPHONE (OUTPATIENT)
Dept: UROLOGY | Facility: CLINIC | Age: 45
End: 2020-05-05

## 2020-05-05 NOTE — TELEPHONE ENCOUNTER
Call can not be completed at this time - will call pt later - pt to drop off another urine. Specimen was mislabeled.

## 2020-05-06 ENCOUNTER — APPOINTMENT (OUTPATIENT)
Dept: GENERAL RADIOLOGY | Facility: HOSPITAL | Age: 45
End: 2020-05-06

## 2020-05-06 ENCOUNTER — HOSPITAL ENCOUNTER (EMERGENCY)
Facility: HOSPITAL | Age: 45
Discharge: HOME | End: 2020-05-06
Attending: EMERGENCY MEDICINE | Admitting: EMERGENCY MEDICINE

## 2020-05-06 VITALS
WEIGHT: 120 LBS | TEMPERATURE: 98 F | SYSTOLIC BLOOD PRESSURE: 120 MMHG | BODY MASS INDEX: 18.19 KG/M2 | OXYGEN SATURATION: 99 % | DIASTOLIC BLOOD PRESSURE: 70 MMHG | HEIGHT: 68 IN | RESPIRATION RATE: 16 BRPM | HEART RATE: 80 BPM

## 2020-05-06 DIAGNOSIS — G44.209 TENSION-TYPE HEADACHE, NOT INTRACTABLE, UNSPECIFIED CHRONICITY PATTERN: Primary | ICD-10-CM

## 2020-05-06 PROCEDURE — 25010000002 PROMETHAZINE PER 50 MG: Performed by: EMERGENCY MEDICINE

## 2020-05-06 PROCEDURE — 72050 X-RAY EXAM NECK SPINE 4/5VWS: CPT

## 2020-05-06 PROCEDURE — 25010000002 ORPHENADRINE CITRATE PER 60 MG: Performed by: EMERGENCY MEDICINE

## 2020-05-06 PROCEDURE — 72052 X-RAY EXAM NECK SPINE 6/>VWS: CPT | Performed by: RADIOLOGY

## 2020-05-06 PROCEDURE — 99283 EMERGENCY DEPT VISIT LOW MDM: CPT

## 2020-05-06 PROCEDURE — 96372 THER/PROPH/DIAG INJ SC/IM: CPT

## 2020-05-06 RX ORDER — ORPHENADRINE CITRATE 30 MG/ML
60 INJECTION INTRAMUSCULAR; INTRAVENOUS ONCE
Status: COMPLETED | OUTPATIENT
Start: 2020-05-06 | End: 2020-05-06

## 2020-05-06 RX ORDER — PROMETHAZINE HYDROCHLORIDE 25 MG/ML
12.5 INJECTION, SOLUTION INTRAMUSCULAR; INTRAVENOUS ONCE
Status: COMPLETED | OUTPATIENT
Start: 2020-05-06 | End: 2020-05-06

## 2020-05-06 RX ADMIN — PROMETHAZINE HYDROCHLORIDE 12.5 MG: 25 INJECTION INTRAMUSCULAR; INTRAVENOUS at 16:28

## 2020-05-06 RX ADMIN — ORPHENADRINE CITRATE 60 MG: 30 INJECTION INTRAMUSCULAR; INTRAVENOUS at 16:28

## 2020-05-06 NOTE — ED NOTES
Pt informs that she has migraine headaches.  Informs that she receives botox for this and usually has to get medication towards the end of the 3 week period between times for botox.  Pt reports that she has a headache with new left sided pain with n/v.     Francisca Schwab RN  05/06/20 4749

## 2020-05-08 NOTE — TELEPHONE ENCOUNTER
All numbers in pt chart are unable to receive calls at this time. Spoke with Dr Blunt yesterday who stated that he did not want a culture sent out on 5/4/20 so will discontinue trying to get ahold of the pt per Dr Blunt.

## 2020-05-11 NOTE — ED PROVIDER NOTES
Subjective   Patient presents to ER with headache        Headache   Pain location:  Generalized  Radiates to:  Does not radiate  Onset quality:  Gradual  Timing:  Constant  Progression:  Worsening  Chronicity:  Recurrent  Similar to prior headaches: yes        Review of Systems   Constitutional: Negative.    Eyes: Negative.    Respiratory: Negative.    Cardiovascular: Negative.    Gastrointestinal: Negative.    Endocrine: Negative.    Genitourinary: Negative.    Musculoskeletal: Negative.    Skin: Negative.    Allergic/Immunologic: Negative.    Neurological: Positive for headaches.   Hematological: Negative.    Psychiatric/Behavioral: Negative.        Past Medical History:   Diagnosis Date   • Abdominal pain    • Abdominal swelling    • Hoyos's disease    • Bipolar 1 disorder (CMS/HCC)    • Brain tumor (benign) (CMS/HCC)    • Brain tumor (CMS/HCC)     R Frontal Lobe per pt   • Brain tumor (CMS/HCC) 2014   • Constipation    • DDD (degenerative disc disease), cervical 05/29/2017   • DDD (degenerative disc disease), cervical    • Diarrhea    • Fibromyalgia    • IBS (irritable bowel syndrome)    • Migraine    • Nausea & vomiting    • PONV (postoperative nausea and vomiting)    • PTSD (post-traumatic stress disorder)    • Rectal bleeding        Allergies   Allergen Reactions   • Ativan [Lorazepam] Hallucinations     confusion   • Sulfa Antibiotics Shortness Of Breath and Swelling   • Sulfa Antibiotics Anaphylaxis   • Reglan [Metoclopramide] Angioedema   • Compazine [Prochlorperazine Edisylate] Hives   • Demerol [Meperidine] Hives   • Droperidol Itching   • Metoclopramide Swelling   • Toradol [Ketorolac Tromethamine] Hives and Itching   • Toradol [Ketorolac Tromethamine] Hives   • Zofran [Ondansetron Hcl] Rash   • Zosyn [Piperacillin Sod-Tazobactam So] Hives       Past Surgical History:   Procedure Laterality Date   • ANAL SCOPE N/A 7/28/2016    Procedure: ANAL SCOPE;  Surgeon: Kael Lopez MD;  Location: [facility] OR;   Service:    • APPENDECTOMY     • COLONOSCOPY N/A 2016    Procedure: COLONOSCOPY  CPTCODE:87621;  Surgeon: Jose Antonio Belle III, MD;  Location: [facility] OR;  Service:    • COLONOSCOPY N/A 2016    Procedure: COLONOSCOPY (57234) CPT;  Surgeon: Jose Antonio Belle III, MD;  Location: [facility] OR;  Service:    • CYSTOSCOPY RETROGRADE PYELOGRAM Bilateral 2017    Procedure: CYSTOSCOPY RETROGRADE PYELOGRAM;  Surgeon: Mu Blunt MD;  Location: [facility] OR;  Service:    • ENDOSCOPY N/A 2016    Procedure: ESOPHAGOGASTRODUODENOSCOPY WITH BIOPSY  CPTCODE:12085;  Surgeon: Jose Antonio Belle III, MD;  Location: [facility] OR;  Service:    • HEMORRHOIDECTOMY N/A 2016    Procedure: HEMORRHOID STAPLING;  Surgeon: Kael Lopez MD;  Location: [facility] OR;  Service:    • HYSTERECTOMY     • KNEE SURGERY     • LAPAROSCOPIC SALPINGOOPHERECTOMY     • PORTACATH PLACEMENT N/A 2017    Procedure: INSERTION OF PORTACATH;  Surgeon: Celso Arredondo MD;  Location: [facility] OR;  Service:    • SHOULDER SURGERY      3 times       Family History   Problem Relation Age of Onset   • Crohn's disease Other    • Hypertension Other    • Diabetes Other    • Irritable bowel syndrome Other    • No Known Problems Father    • No Known Problems Mother        Social History     Socioeconomic History   • Marital status:      Spouse name: Not on file   • Number of children: Not on file   • Years of education: Not on file   • Highest education level: Not on file   Tobacco Use   • Smoking status: Former Smoker     Packs/day: 1.00     Years: 20.00     Pack years: 20.00     Last attempt to quit: 2015     Years since quittin.5   • Smokeless tobacco: Never Used   Substance and Sexual Activity   • Alcohol use: No   • Drug use: Yes     Types: Marijuana     Comment: for pain   • Sexual activity: Defer     Birth control/protection: Surgical   Social History Narrative    ** Merged History Encounter **         Lives in  [place], works as a , independent of ADL           Objective   Physical Exam   Constitutional: She appears well-developed and well-nourished.   HENT:   Head: Normocephalic.   Eyes: Pupils are equal, round, and reactive to light.   Neck: Normal range of motion.   Cardiovascular: Normal rate and regular rhythm.   Pulmonary/Chest: Effort normal.   Abdominal: Soft.   Musculoskeletal: Normal range of motion.   Neurological: She is alert.   Skin: Skin is warm. Capillary refill takes less than 2 seconds.   Psychiatric: She has a normal mood and affect.       Procedures           ED Course                                           MDM    Final diagnoses:   Tension-type headache, not intractable, unspecified chronicity pattern            Mitesh Garza MD  05/11/20 2120

## 2020-05-15 ENCOUNTER — TELEPHONE (OUTPATIENT)
Dept: UROLOGY | Facility: CLINIC | Age: 45
End: 2020-05-15

## 2020-05-15 NOTE — TELEPHONE ENCOUNTER
Pts  stated that the pt is in the hospital -  and does indeed have a stone that they saw in the CT,  was wanting to keep the pt for the weekend for pain control but stated that the doctors there feel that she will pass the stone. Sony wanted for me to assure him that Dr Blunt would write the pt some pain medication which I explained that I can not do that, explained that we could get her on the schedule this afternoon but they had to bring the CD and written CT results with them. Transferred to [facility] to make an appt, Sony wanted the latest appt we have for today. Had [facility] explain about needing the CD and written CT results as well.

## 2020-05-19 ENCOUNTER — OFFICE VISIT (OUTPATIENT)
Dept: UROLOGY | Facility: CLINIC | Age: 45
End: 2020-05-19

## 2020-05-19 VITALS — HEIGHT: 68 IN | BODY MASS INDEX: 18.19 KG/M2 | TEMPERATURE: 96.5 F | WEIGHT: 120 LBS

## 2020-05-19 DIAGNOSIS — R30.0 DYSURIA: Primary | ICD-10-CM

## 2020-05-19 DIAGNOSIS — N35.028 OTHER POST-TRAUMATIC URETHRAL STRICTURE, FEMALE: ICD-10-CM

## 2020-05-19 DIAGNOSIS — R31.0 GROSS HEMATURIA: ICD-10-CM

## 2020-05-19 LAB
BILIRUB BLD-MCNC: NEGATIVE MG/DL
CLARITY, POC: CLEAR
COLOR UR: YELLOW
GLUCOSE UR STRIP-MCNC: NEGATIVE MG/DL
KETONES UR QL: NEGATIVE
LEUKOCYTE EST, POC: NEGATIVE
NITRITE UR-MCNC: NEGATIVE MG/ML
PH UR: 7.5 [PH] (ref 5–8)
PROT UR STRIP-MCNC: NEGATIVE MG/DL
RBC # UR STRIP: ABNORMAL /UL
SP GR UR: 1.01 (ref 1–1.03)
UROBILINOGEN UR QL: NORMAL

## 2020-05-19 PROCEDURE — 96372 THER/PROPH/DIAG INJ SC/IM: CPT | Performed by: UROLOGY

## 2020-05-19 PROCEDURE — 99213 OFFICE O/P EST LOW 20 MIN: CPT | Performed by: UROLOGY

## 2020-05-19 PROCEDURE — 53661 DILATION OF URETHRA: CPT | Performed by: UROLOGY

## 2020-05-19 RX ORDER — OXYCODONE AND ACETAMINOPHEN 10; 325 MG/1; MG/1
1 TABLET ORAL EVERY 6 HOURS PRN
Qty: 8 TABLET | Refills: 0 | Status: SHIPPED | OUTPATIENT
Start: 2020-05-19 | End: 2020-06-18 | Stop reason: SDUPTHER

## 2020-05-19 RX ADMIN — GENTAMICIN SULFATE 80 MG: 40 INJECTION, SOLUTION INTRAMUSCULAR; INTRAVENOUS at 10:00

## 2020-05-19 NOTE — PROGRESS NOTES
Chief Complaint:          Chief Complaint   Patient presents with   • Difficulty Urinating     follow up        HPI:   45 y.o. female  returns today accompanied by her  she is not been seen for several months because she lost her insurance.  She is lost an excessive amount of weight she looks almost moribund.  She states she is actually gained 15 pounds from her current weight of 110 pounds.  She has severe urethral meatal stenosis and is requesting dilation today she was also told the [facility] that she had a rising hCG and I strongly recommended she follow-up with the her gynecologist in [place].  As any absence of uterus and ovaries this may be representative of a tumor.  Nonetheless, I am going to recommend dilation after unremarkable prep and drape I dilated it was extremely tight to 26 Serbian.  Apparently, while in [place] this weekend they decided to go to the [facility] and she said she was admitted for stone the CT disc with and work on I reviewed the report stating there was one tiny punctate nephrolith.  This is not changed.  I went ahead and repeat dilator.  I will see her back in 6 weeks      Past Medical History:        Past Medical History:   Diagnosis Date   • Abdominal pain    • Abdominal swelling    • Hoyos's disease    • Bipolar 1 disorder (CMS/HCC)    • Brain tumor (benign) (CMS/HCC)    • Brain tumor (CMS/HCC)     R Frontal Lobe per pt   • Brain tumor (CMS/HCC) 2014   • Constipation    • DDD (degenerative disc disease), cervical 05/29/2017   • DDD (degenerative disc disease), cervical    • Diarrhea    • Fibromyalgia    • IBS (irritable bowel syndrome)    • Migraine    • Nausea & vomiting    • PONV (postoperative nausea and vomiting)    • PTSD (post-traumatic stress disorder)    • Rectal bleeding          Current Meds:     Current Outpatient Medications   Medication Sig Dispense Refill   • acetaminophen-codeine (TYLENOL #3) 300-30 MG per tablet Take 1 tablet  by mouth Every 6 (Six) Hours As Needed for Moderate Pain . 12 tablet 0   • albuterol (PROVENTIL HFA;VENTOLIN HFA) 108 (90 Base) MCG/ACT inhaler Inhale 2 puffs 2 (Two) Times a Day.     • Azelastine HCl 137 MCG/SPRAY solution 1 spray into each nostril As Needed (Allergies).     • busPIRone (BUSPAR) 15 MG tablet Take 15 mg by mouth 3 (three) times a day        • cefuroxime (CEFTIN) 500 MG tablet      • cetirizine (zyrTEC) 10 MG tablet Take 1 tablet by mouth Daily. 30 tablet 0   • cyclobenzaprine (FLEXERIL) 5 MG tablet      • diclofenac (VOLTAREN) 1 % gel gel      • divalproex (DEPAKOTE) 500 MG DR tablet Take 1 tablet by mouth 3 (Three) Times a Day. 90 tablet 2   • fluticasone (VERAMYST) 27.5 MCG/SPRAY nasal spray 2 sprays into each nostril Daily.     • montelukast (SINGULAIR) 10 MG tablet Take 10 mg by mouth Every Night.     • ondansetron (ZOFRAN) 4 MG tablet      • orphenadrine (NORFLEX) 100 MG 12 hr tablet Take 1 tablet by mouth 2 (Two) Times a Day. 20 tablet 0   • oxyCODONE (ROXICODONE) 5 MG immediate release tablet      • oxyCODONE-acetaminophen (Percocet)  MG per tablet Take 1 tablet by mouth Every 6 (Six) Hours As Needed for Moderate Pain . 8 tablet 0   • pantoprazole (PROTONIX) 40 MG EC tablet Take 40 mg by mouth 2 (Two) Times a Day As Needed.     • phenazopyridine (PYRIDIUM) 100 MG tablet      • polyethylene glycol (MIRALAX) powder Take 17 g by mouth Daily. 289 g 0   • promethazine (PHENERGAN) 25 MG tablet      • sertraline (ZOLOFT) 100 MG tablet Take 100 mg by mouth 2 (Two) Times a Day.     • SUMAtriptan (IMITREX) 6 MG/0.5ML solution injection Inject 6 mg under the skin 1 (One) Time.     • tiZANidine (ZANAFLEX) 4 MG tablet      • traMADol (ULTRAM) 50 MG tablet        No current facility-administered medications for this visit.         Allergies:      Allergies   Allergen Reactions   • Ativan [Lorazepam] Hallucinations     confusion   • Sulfa Antibiotics Shortness Of Breath and Swelling   • Sulfa  Antibiotics Anaphylaxis   • Reglan [Metoclopramide] Angioedema   • Compazine [Prochlorperazine Edisylate] Hives   • Demerol [Meperidine] Hives   • Droperidol Itching   • Metoclopramide Swelling   • Toradol [Ketorolac Tromethamine] Hives and Itching   • Toradol [Ketorolac Tromethamine] Hives   • Zofran [Ondansetron Hcl] Rash   • Zosyn [Piperacillin Sod-Tazobactam So] Hives        Past Surgical History:     Past Surgical History:   Procedure Laterality Date   • ANAL SCOPE N/A 7/28/2016    Procedure: ANAL SCOPE;  Surgeon: Kael Lopez MD;  Location: [facility] OR;  Service:    • APPENDECTOMY     • COLONOSCOPY N/A 6/30/2016    Procedure: COLONOSCOPY  CPTCODE:11610;  Surgeon: Jose Antonio Belle III, MD;  Location: [facility] OR;  Service:    • COLONOSCOPY N/A 7/7/2016    Procedure: COLONOSCOPY (88068) CPT;  Surgeon: Jose Antonio Belle III, MD;  Location: [facility] OR;  Service:    • CYSTOSCOPY RETROGRADE PYELOGRAM Bilateral 4/28/2017    Procedure: CYSTOSCOPY RETROGRADE PYELOGRAM;  Surgeon: Mu Blunt MD;  Location: [facility] OR;  Service:    • ENDOSCOPY N/A 6/30/2016    Procedure: ESOPHAGOGASTRODUODENOSCOPY WITH BIOPSY  CPTCODE:53404;  Surgeon: Jose Antonio Belle III, MD;  Location: [facility] OR;  Service:    • HEMORRHOIDECTOMY N/A 7/28/2016    Procedure: HEMORRHOID STAPLING;  Surgeon: Kael Lopez MD;  Location: [facility] OR;  Service:    • HYSTERECTOMY     • KNEE SURGERY     • LAPAROSCOPIC SALPINGOOPHERECTOMY     • PORTACATH PLACEMENT N/A 7/28/2017    Procedure: INSERTION OF PORTACATH;  Surgeon: Celso Arredondo MD;  Location: [facility] OR;  Service:    • SHOULDER SURGERY      3 times         Social History:     Social History     Socioeconomic History   • Marital status:      Spouse name: Not on file   • Number of children: Not on file   • Years of education: Not on file   • Highest education level: Not on file   Tobacco Use   • Smoking status: Former Smoker     Packs/day: 1.00     Years: 20.00     Pack  years: 20.00     Last attempt to quit: 2015     Years since quittin.5   • Smokeless tobacco: Never Used   Substance and Sexual Activity   • Alcohol use: No   • Drug use: Yes     Types: Marijuana     Comment: for pain   • Sexual activity: Defer     Birth control/protection: Surgical   Social History Narrative    ** Merged History Encounter **         Lives in [place], works as a , independent of ADL       Family History:     Family History   Problem Relation Age of Onset   • Crohn's disease Other    • Hypertension Other    • Diabetes Other    • Irritable bowel syndrome Other    • No Known Problems Father    • No Known Problems Mother        Review of Systems:     Review of Systems   Constitutional: Negative.  Negative for activity change, appetite change, chills, diaphoresis, fatigue and unexpected weight change.   HENT: Negative for congestion, dental problem, drooling, ear discharge, ear pain, facial swelling, hearing loss, mouth sores, nosebleeds, postnasal drip, rhinorrhea, sinus pressure, sneezing, sore throat, tinnitus, trouble swallowing and voice change.    Eyes: Negative.  Negative for photophobia, pain, discharge, redness, itching and visual disturbance.   Respiratory: Negative.  Negative for apnea, cough, choking, chest tightness, shortness of breath, wheezing and stridor.    Cardiovascular: Negative.  Negative for chest pain, palpitations and leg swelling.   Gastrointestinal: Negative.  Negative for abdominal distention, abdominal pain, anal bleeding, blood in stool, constipation, diarrhea, nausea, rectal pain and vomiting.   Endocrine: Negative.  Negative for cold intolerance, heat intolerance, polydipsia, polyphagia and polyuria.   Musculoskeletal: Negative.  Negative for arthralgias, back pain, gait problem, joint swelling, myalgias, neck pain and neck stiffness.   Skin: Negative.  Negative for color change, pallor, rash and wound.   Allergic/Immunologic: Negative.  Negative for  environmental allergies, food allergies and immunocompromised state.   Neurological: Negative.  Negative for dizziness, tremors, seizures, syncope, facial asymmetry, speech difficulty, weakness, light-headedness, numbness and headaches.   Hematological: Negative.  Negative for adenopathy. Does not bruise/bleed easily.   Psychiatric/Behavioral: Negative for agitation, behavioral problems, confusion, decreased concentration, dysphoric mood, hallucinations, self-injury, sleep disturbance and suicidal ideas. The patient is not nervous/anxious and is not hyperactive.    All other systems reviewed and are negative.      Physical Exam:     Physical Exam   Constitutional: She appears well-developed and well-nourished.   HENT:   Head: Normocephalic and atraumatic.   Right Ear: External ear normal.   Left Ear: External ear normal.   Mouth/Throat: Oropharynx is clear and moist.   Eyes: Pupils are equal, round, and reactive to light. Conjunctivae are normal.   Cardiovascular: Normal rate, regular rhythm, normal heart sounds and intact distal pulses.   Pulmonary/Chest: Effort normal and breath sounds normal.   Abdominal: Soft. Bowel sounds are normal. She exhibits no distension and no mass. There is no tenderness. There is no rebound and no guarding.   Genitourinary: Vagina normal. No vaginal discharge found.   Musculoskeletal: Normal range of motion.   Neurological: She is alert. She has normal reflexes.   Skin: Skin is warm and dry.   Psychiatric: She has a normal mood and affect. Her behavior is normal. Judgment and thought content normal.       I have reviewed the following portions of the patient's history: allergies, current medications, past family history, past medical history, past social history, past surgical history, problem list and ROS and confirm it's accurate.      Procedure:   urethral dilation-after an appropriate informed consent the patient was brought to the procedure suite.  The urethra was gently  anesthetized with 10 cc of 2% viscous Xylocaine jelly.  After an adequate period of topical anesthesia I went ahead and the urethra was gently anesthetized and dilated with Reese sounds from 16-26 Malagasy sequentially without complication the patient was given gentamicin as prophylaxis with 80 mg    Assessment/Plan:   Urethral stricture-status post dilation  Renal calculus she has a known tiny stable stone in the left kidney            Patient's Body mass index is 18.25 kg/m². BMI is within normal parameters. No follow-up required..              This document has been electronically signed by VERENICE FINK MD May 19, 2020 11:04

## 2020-05-27 RX ORDER — GENTAMICIN SULFATE 40 MG/ML
80 INJECTION, SOLUTION INTRAMUSCULAR; INTRAVENOUS ONCE
Status: COMPLETED | OUTPATIENT
Start: 2020-05-19 | End: 2020-05-19

## 2020-05-31 ENCOUNTER — HOSPITAL ENCOUNTER (EMERGENCY)
Facility: HOSPITAL | Age: 45
Discharge: HOME | End: 2020-05-31
Attending: EMERGENCY MEDICINE | Admitting: EMERGENCY MEDICINE

## 2020-05-31 VITALS
OXYGEN SATURATION: 98 % | SYSTOLIC BLOOD PRESSURE: 132 MMHG | HEIGHT: 68 IN | BODY MASS INDEX: 18.94 KG/M2 | DIASTOLIC BLOOD PRESSURE: 68 MMHG | TEMPERATURE: 97.9 F | RESPIRATION RATE: 17 BRPM | WEIGHT: 125 LBS | HEART RATE: 68 BPM

## 2020-05-31 DIAGNOSIS — G43.909 MIGRAINE WITHOUT STATUS MIGRAINOSUS, NOT INTRACTABLE, UNSPECIFIED MIGRAINE TYPE: Primary | ICD-10-CM

## 2020-05-31 PROCEDURE — 25010000002 BUTORPHANOL PER 1 MG: Performed by: EMERGENCY MEDICINE

## 2020-05-31 PROCEDURE — 99283 EMERGENCY DEPT VISIT LOW MDM: CPT

## 2020-05-31 PROCEDURE — 96365 THER/PROPH/DIAG IV INF INIT: CPT

## 2020-05-31 PROCEDURE — 25010000002 DEXAMETHASONE: Performed by: EMERGENCY MEDICINE

## 2020-05-31 PROCEDURE — 96375 TX/PRO/DX INJ NEW DRUG ADDON: CPT

## 2020-05-31 PROCEDURE — 25010000002 PROMETHAZINE PER 50 MG: Performed by: EMERGENCY MEDICINE

## 2020-05-31 RX ORDER — PROMETHAZINE HYDROCHLORIDE 25 MG/ML
12.5 INJECTION, SOLUTION INTRAMUSCULAR; INTRAVENOUS ONCE
Status: COMPLETED | OUTPATIENT
Start: 2020-05-31 | End: 2020-05-31

## 2020-05-31 RX ORDER — SODIUM CHLORIDE 0.9 % (FLUSH) 0.9 %
10 SYRINGE (ML) INJECTION AS NEEDED
Status: DISCONTINUED | OUTPATIENT
Start: 2020-05-31 | End: 2020-05-31 | Stop reason: HOSPADM

## 2020-05-31 RX ADMIN — PROMETHAZINE HYDROCHLORIDE 12.5 MG: 25 INJECTION INTRAMUSCULAR; INTRAVENOUS at 16:48

## 2020-05-31 RX ADMIN — DEXAMETHASONE SODIUM PHOSPHATE 10 MG: 10 INJECTION INTRAMUSCULAR; INTRAVENOUS at 16:50

## 2020-05-31 RX ADMIN — SODIUM CHLORIDE 1000 ML: 9 INJECTION, SOLUTION INTRAVENOUS at 16:48

## 2020-05-31 RX ADMIN — BUTORPHANOL TARTRATE 2 MG: 2 INJECTION, SOLUTION INTRAMUSCULAR; INTRAVENOUS at 16:48

## 2020-06-08 ENCOUNTER — HOSPITAL ENCOUNTER (EMERGENCY)
Facility: HOSPITAL | Age: 45
Discharge: HOME | End: 2020-06-08
Attending: EMERGENCY MEDICINE | Admitting: EMERGENCY MEDICINE

## 2020-06-08 ENCOUNTER — APPOINTMENT (OUTPATIENT)
Dept: CT IMAGING | Facility: HOSPITAL | Age: 45
End: 2020-06-08

## 2020-06-08 VITALS
DIASTOLIC BLOOD PRESSURE: 69 MMHG | RESPIRATION RATE: 18 BRPM | SYSTOLIC BLOOD PRESSURE: 123 MMHG | WEIGHT: 126 LBS | HEART RATE: 85 BPM | BODY MASS INDEX: 19.1 KG/M2 | OXYGEN SATURATION: 98 % | HEIGHT: 68 IN | TEMPERATURE: 98.9 F

## 2020-06-08 DIAGNOSIS — R10.9 ACUTE LEFT FLANK PAIN: Primary | ICD-10-CM

## 2020-06-08 LAB
ALBUMIN SERPL-MCNC: 4.3 G/DL (ref 3.5–5.2)
ALBUMIN/GLOB SERPL: 1.7 G/DL
ALP SERPL-CCNC: 76 U/L (ref 39–117)
ALT SERPL W P-5'-P-CCNC: 40 U/L (ref 1–33)
ANION GAP SERPL CALCULATED.3IONS-SCNC: 7.7 MMOL/L (ref 5–15)
AST SERPL-CCNC: 36 U/L (ref 1–32)
BACTERIA UR QL AUTO: ABNORMAL /HPF
BASOPHILS # BLD AUTO: 0.01 10*3/MM3 (ref 0–0.2)
BASOPHILS NFR BLD AUTO: 0.2 % (ref 0–1.5)
BILIRUB SERPL-MCNC: 0.8 MG/DL (ref 0.2–1.2)
BILIRUB UR QL STRIP: NEGATIVE
BUN BLD-MCNC: 13 MG/DL (ref 6–20)
BUN/CREAT SERPL: 22 (ref 7–25)
CALCIUM SPEC-SCNC: 9.4 MG/DL (ref 8.6–10.5)
CHLORIDE SERPL-SCNC: 105 MMOL/L (ref 98–107)
CLARITY UR: CLEAR
CO2 SERPL-SCNC: 26.3 MMOL/L (ref 22–29)
COLOR UR: ABNORMAL
CREAT BLD-MCNC: 0.59 MG/DL (ref 0.57–1)
CRP SERPL-MCNC: <0.03 MG/DL (ref 0–0.5)
DEPRECATED RDW RBC AUTO: 43.3 FL (ref 37–54)
EOSINOPHIL # BLD AUTO: 0.03 10*3/MM3 (ref 0–0.4)
EOSINOPHIL NFR BLD AUTO: 0.6 % (ref 0.3–6.2)
ERYTHROCYTE [DISTWIDTH] IN BLOOD BY AUTOMATED COUNT: 12.7 % (ref 12.3–15.4)
GFR SERPL CREATININE-BSD FRML MDRD: 110 ML/MIN/1.73
GLOBULIN UR ELPH-MCNC: 2.6 GM/DL
GLUCOSE BLD-MCNC: 96 MG/DL (ref 65–99)
GLUCOSE UR STRIP-MCNC: NEGATIVE MG/DL
HCT VFR BLD AUTO: 41.2 % (ref 34–46.6)
HGB BLD-MCNC: 13.4 G/DL (ref 12–15.9)
HGB UR QL STRIP.AUTO: ABNORMAL
HYALINE CASTS UR QL AUTO: ABNORMAL /LPF
IMM GRANULOCYTES # BLD AUTO: 0.01 10*3/MM3 (ref 0–0.05)
IMM GRANULOCYTES NFR BLD AUTO: 0.2 % (ref 0–0.5)
KETONES UR QL STRIP: NEGATIVE
LEUKOCYTE ESTERASE UR QL STRIP.AUTO: ABNORMAL
LYMPHOCYTES # BLD AUTO: 1.58 10*3/MM3 (ref 0.7–3.1)
LYMPHOCYTES NFR BLD AUTO: 33 % (ref 19.6–45.3)
MCH RBC QN AUTO: 30.2 PG (ref 26.6–33)
MCHC RBC AUTO-ENTMCNC: 32.5 G/DL (ref 31.5–35.7)
MCV RBC AUTO: 92.8 FL (ref 79–97)
MONOCYTES # BLD AUTO: 0.3 10*3/MM3 (ref 0.1–0.9)
MONOCYTES NFR BLD AUTO: 6.3 % (ref 5–12)
NEUTROPHILS # BLD AUTO: 2.86 10*3/MM3 (ref 1.7–7)
NEUTROPHILS NFR BLD AUTO: 59.7 % (ref 42.7–76)
NITRITE UR QL STRIP: NEGATIVE
NRBC BLD AUTO-RTO: 0 /100 WBC (ref 0–0.2)
PH UR STRIP.AUTO: 5.5 [PH] (ref 5–8)
PLATELET # BLD AUTO: 143 10*3/MM3 (ref 140–450)
PMV BLD AUTO: 10.4 FL (ref 6–12)
POTASSIUM BLD-SCNC: 3.8 MMOL/L (ref 3.5–5.2)
PROT SERPL-MCNC: 6.9 G/DL (ref 6–8.5)
PROT UR QL STRIP: NEGATIVE
RBC # BLD AUTO: 4.44 10*6/MM3 (ref 3.77–5.28)
RBC # UR: ABNORMAL /HPF
REF LAB TEST METHOD: ABNORMAL
SODIUM BLD-SCNC: 139 MMOL/L (ref 136–145)
SP GR UR STRIP: 1.01 (ref 1–1.03)
SQUAMOUS #/AREA URNS HPF: ABNORMAL /HPF
UROBILINOGEN UR QL STRIP: ABNORMAL
WBC NRBC COR # BLD: 4.79 10*3/MM3 (ref 3.4–10.8)
WBC UR QL AUTO: ABNORMAL /HPF

## 2020-06-08 PROCEDURE — 96376 TX/PRO/DX INJ SAME DRUG ADON: CPT

## 2020-06-08 PROCEDURE — 96374 THER/PROPH/DIAG INJ IV PUSH: CPT

## 2020-06-08 PROCEDURE — 99284 EMERGENCY DEPT VISIT MOD MDM: CPT

## 2020-06-08 PROCEDURE — 87086 URINE CULTURE/COLONY COUNT: CPT | Performed by: EMERGENCY MEDICINE

## 2020-06-08 PROCEDURE — 85025 COMPLETE CBC W/AUTO DIFF WBC: CPT | Performed by: EMERGENCY MEDICINE

## 2020-06-08 PROCEDURE — 74176 CT ABD & PELVIS W/O CONTRAST: CPT | Performed by: RADIOLOGY

## 2020-06-08 PROCEDURE — 81001 URINALYSIS AUTO W/SCOPE: CPT | Performed by: EMERGENCY MEDICINE

## 2020-06-08 PROCEDURE — 96372 THER/PROPH/DIAG INJ SC/IM: CPT

## 2020-06-08 PROCEDURE — 80053 COMPREHEN METABOLIC PANEL: CPT | Performed by: EMERGENCY MEDICINE

## 2020-06-08 PROCEDURE — 25010000002 PROMETHAZINE PER 50 MG: Performed by: EMERGENCY MEDICINE

## 2020-06-08 PROCEDURE — 86140 C-REACTIVE PROTEIN: CPT | Performed by: EMERGENCY MEDICINE

## 2020-06-08 PROCEDURE — 25010000002 BUTORPHANOL PER 1 MG: Performed by: EMERGENCY MEDICINE

## 2020-06-08 PROCEDURE — 74176 CT ABD & PELVIS W/O CONTRAST: CPT

## 2020-06-08 RX ORDER — PROMETHAZINE HYDROCHLORIDE 25 MG/ML
12.5 INJECTION, SOLUTION INTRAMUSCULAR; INTRAVENOUS ONCE
Status: COMPLETED | OUTPATIENT
Start: 2020-06-08 | End: 2020-06-08

## 2020-06-08 RX ORDER — SODIUM CHLORIDE 0.9 % (FLUSH) 0.9 %
10 SYRINGE (ML) INJECTION AS NEEDED
Status: DISCONTINUED | OUTPATIENT
Start: 2020-06-08 | End: 2020-06-08 | Stop reason: HOSPADM

## 2020-06-08 RX ORDER — BUTORPHANOL TARTRATE 1 MG/ML
1 INJECTION, SOLUTION INTRAMUSCULAR; INTRAVENOUS ONCE
Status: COMPLETED | OUTPATIENT
Start: 2020-06-08 | End: 2020-06-08

## 2020-06-08 RX ORDER — PROMETHAZINE HYDROCHLORIDE 25 MG/ML
12.5 INJECTION, SOLUTION INTRAMUSCULAR; INTRAVENOUS ONCE
Status: DISCONTINUED | OUTPATIENT
Start: 2020-06-08 | End: 2020-06-08

## 2020-06-08 RX ADMIN — PROMETHAZINE HYDROCHLORIDE 12.5 MG: 25 INJECTION INTRAMUSCULAR; INTRAVENOUS at 17:48

## 2020-06-08 RX ADMIN — BUTORPHANOL TARTRATE 1 MG: 1 INJECTION, SOLUTION INTRAMUSCULAR; INTRAVENOUS at 17:48

## 2020-06-08 RX ADMIN — SODIUM CHLORIDE 1000 ML: 9 INJECTION, SOLUTION INTRAVENOUS at 15:31

## 2020-06-08 RX ADMIN — BUTORPHANOL TARTRATE 2 MG: 2 INJECTION, SOLUTION INTRAMUSCULAR; INTRAVENOUS at 15:31

## 2020-06-08 RX ADMIN — PROMETHAZINE HYDROCHLORIDE 12.5 MG: 25 INJECTION INTRAMUSCULAR; INTRAVENOUS at 15:34

## 2020-06-09 LAB — BACTERIA SPEC AEROBE CULT: NORMAL

## 2020-06-09 NOTE — ED PROVIDER NOTES
Subjective     History provided by:  Patient  Flank Pain   Pain location:  L flank  Pain quality: aching and stabbing    Pain radiates to:  Does not radiate  Pain severity:  Moderate  Onset quality:  Sudden  Duration:  2 days  Timing:  Constant  Progression:  Worsening  Chronicity:  Recurrent  Relieved by:  Nothing  Associated symptoms: nausea and vomiting    Associated symptoms: no chest pain, no dysuria and no fever        Review of Systems   Constitutional: Negative.  Negative for fever.   HENT: Negative.    Respiratory: Negative.    Cardiovascular: Negative.  Negative for chest pain.   Gastrointestinal: Positive for nausea and vomiting. Negative for abdominal pain.   Endocrine: Negative.    Genitourinary: Positive for flank pain. Negative for dysuria.   Skin: Negative.    Neurological: Negative.    Psychiatric/Behavioral: Negative.    All other systems reviewed and are negative.      Past Medical History:   Diagnosis Date   • Abdominal pain    • Abdominal swelling    • Hoyos's disease    • Bipolar 1 disorder (CMS/HCC)    • Brain tumor (benign) (CMS/HCC)    • Brain tumor (CMS/HCC)     R Frontal Lobe per pt   • Brain tumor (CMS/HCC) 2014   • Constipation    • DDD (degenerative disc disease), cervical 05/29/2017   • DDD (degenerative disc disease), cervical    • Diarrhea    • Fibromyalgia    • IBS (irritable bowel syndrome)    • Migraine    • Nausea & vomiting    • PONV (postoperative nausea and vomiting)    • PTSD (post-traumatic stress disorder)    • Rectal bleeding        Allergies   Allergen Reactions   • Ativan [Lorazepam] Hallucinations     confusion   • Sulfa Antibiotics Shortness Of Breath and Swelling   • Sulfa Antibiotics Anaphylaxis   • Reglan [Metoclopramide] Angioedema   • Compazine [Prochlorperazine Edisylate] Hives   • Demerol [Meperidine] Hives   • Droperidol Itching   • Metoclopramide Swelling   • Toradol [Ketorolac Tromethamine] Hives and Itching   • Toradol [Ketorolac Tromethamine] Hives   •  Zofran [Ondansetron Hcl] Rash   • Zosyn [Piperacillin Sod-Tazobactam So] Hives       Past Surgical History:   Procedure Laterality Date   • ANAL SCOPE N/A 2016    Procedure: ANAL SCOPE;  Surgeon: Kael Lopez MD;  Location: [facility] OR;  Service:    • APPENDECTOMY     • COLONOSCOPY N/A 2016    Procedure: COLONOSCOPY  CPTCODE:19724;  Surgeon: Jose Antonio Belle III, MD;  Location: [facility] OR;  Service:    • COLONOSCOPY N/A 2016    Procedure: COLONOSCOPY (43732) CPT;  Surgeon: Jose Antonio Belle III, MD;  Location: [facility] OR;  Service:    • CYSTOSCOPY RETROGRADE PYELOGRAM Bilateral 2017    Procedure: CYSTOSCOPY RETROGRADE PYELOGRAM;  Surgeon: Mu Blunt MD;  Location: [facility] OR;  Service:    • ENDOSCOPY N/A 2016    Procedure: ESOPHAGOGASTRODUODENOSCOPY WITH BIOPSY  CPTCODE:89102;  Surgeon: Jose Antonio Belle III, MD;  Location: [facility] OR;  Service:    • HEMORRHOIDECTOMY N/A 2016    Procedure: HEMORRHOID STAPLING;  Surgeon: Kael Lopez MD;  Location: [facility] OR;  Service:    • HYSTERECTOMY     • KNEE SURGERY     • LAPAROSCOPIC SALPINGOOPHERECTOMY     • PORTACATH PLACEMENT N/A 2017    Procedure: INSERTION OF PORTACATH;  Surgeon: Celso Arredondo MD;  Location: [facility] OR;  Service:    • SHOULDER SURGERY      3 times       Family History   Problem Relation Age of Onset   • Crohn's disease Other    • Hypertension Other    • Diabetes Other    • Irritable bowel syndrome Other    • No Known Problems Father    • No Known Problems Mother        Social History     Socioeconomic History   • Marital status:      Spouse name: Not on file   • Number of children: Not on file   • Years of education: Not on file   • Highest education level: Not on file   Tobacco Use   • Smoking status: Former Smoker     Packs/day: 1.00     Years: 20.00     Pack years: 20.00     Last attempt to quit: 2015     Years since quittin.6   • Smokeless tobacco: Never Used   Substance and  Sexual Activity   • Alcohol use: No   • Drug use: Yes     Types: Marijuana     Comment: for pain   • Sexual activity: Defer     Birth control/protection: Surgical   Social History Narrative    ** Merged History Encounter **         Lives in [place], works as a , independent of ADL           Objective   Physical Exam   Constitutional: She is oriented to person, place, and time. She appears well-developed and well-nourished. No distress.   HENT:   Head: Normocephalic and atraumatic.   Right Ear: External ear normal.   Left Ear: External ear normal.   Nose: Nose normal.   Eyes: Conjunctivae are normal.   Neck: Normal range of motion. Neck supple. No JVD present. No tracheal deviation present.   Cardiovascular: Normal rate, regular rhythm and normal heart sounds.   No murmur heard.  Pulmonary/Chest: Effort normal and breath sounds normal. No respiratory distress. She has no wheezes.   Abdominal: Soft. There is tenderness.   Left flank   Musculoskeletal: Normal range of motion. She exhibits no edema or deformity.   Neurological: She is alert and oriented to person, place, and time. No cranial nerve deficit.   Skin: Skin is warm and dry. No rash noted. She is not diaphoretic. No erythema. No pallor.   Psychiatric: She has a normal mood and affect. Her behavior is normal. Thought content normal.   Nursing note and vitals reviewed.      Procedures           ED Course  ED Course as of Jun 08 2149 Mon Jun 08, 2020   1549 Disregard COVID testing on this patient, order was placed on this patient accidentally    [RB]   1635 CT rad interpreted:  1. Fullness of the right renal collecting system without evidence of  stone. This could BE due to a recently passed right-sided stone.  Correlate with history.  2. Fatty infiltration of liver.  3. Moderate constipation.  4. Otherwise stable exam with other nonacute findings as above.    [RB]   7025 Patient has appointment with Dr. Verma in 3 weeks for dilatation of the urethra.   At this point in time no reason for admission.    [RB]      ED Course User Index  [RB] Hudson Suarez II, PA                                           MDM  Number of Diagnoses or Management Options  Acute left flank pain: new and requires workup     Amount and/or Complexity of Data Reviewed  Clinical lab tests: ordered and reviewed  Tests in the radiology section of CPT®: ordered and reviewed    Risk of Complications, Morbidity, and/or Mortality  Presenting problems: moderate  Diagnostic procedures: moderate  Management options: low    Patient Progress  Patient progress: stable      Final diagnoses:   Acute left flank pain            Hudson Suarez II, PA  06/08/20 8415

## 2020-06-17 ENCOUNTER — TELEPHONE (OUTPATIENT)
Dept: UROLOGY | Facility: CLINIC | Age: 45
End: 2020-06-17

## 2020-06-17 NOTE — TELEPHONE ENCOUNTER
Patient was seen in [place] ER last night. Requesting to be seen Thursday, she believes she needs to be stretched and she is also out of catheters. Please advise.

## 2020-06-18 ENCOUNTER — TELEPHONE (OUTPATIENT)
Dept: UROLOGY | Facility: CLINIC | Age: 45
End: 2020-06-18

## 2020-06-18 ENCOUNTER — OFFICE VISIT (OUTPATIENT)
Dept: UROLOGY | Facility: CLINIC | Age: 45
End: 2020-06-18

## 2020-06-18 VITALS — TEMPERATURE: 97.6 F

## 2020-06-18 DIAGNOSIS — N35.028 OTHER POST-TRAUMATIC URETHRAL STRICTURE, FEMALE: Primary | ICD-10-CM

## 2020-06-18 DIAGNOSIS — R31.0 GROSS HEMATURIA: ICD-10-CM

## 2020-06-18 PROCEDURE — 53661 DILATION OF URETHRA: CPT | Performed by: UROLOGY

## 2020-06-18 PROCEDURE — 96372 THER/PROPH/DIAG INJ SC/IM: CPT | Performed by: UROLOGY

## 2020-06-18 RX ORDER — GENTAMICIN SULFATE 40 MG/ML
80 INJECTION, SOLUTION INTRAMUSCULAR; INTRAVENOUS ONCE
Status: COMPLETED | OUTPATIENT
Start: 2020-06-18 | End: 2020-06-18

## 2020-06-18 RX ORDER — OXYCODONE AND ACETAMINOPHEN 10; 325 MG/1; MG/1
1 TABLET ORAL EVERY 6 HOURS PRN
Qty: 8 TABLET | Refills: 0 | Status: SHIPPED | OUTPATIENT
Start: 2020-06-18 | End: 2020-07-13 | Stop reason: SDUPTHER

## 2020-06-18 RX ADMIN — GENTAMICIN SULFATE 80 MG: 40 INJECTION, SOLUTION INTRAMUSCULAR; INTRAVENOUS at 10:30

## 2020-06-18 NOTE — PROGRESS NOTES
Chief Complaint:          Chief Complaint   Patient presents with   • Uretheral Stricture       HPI:   45 y.o. female   returns today accompanied by her  she is not been seen for several months because she lost her insurance.  She is lost an excessive amount of weight she looks almost moribund.  She states she is actually gained 15 pounds from her current weight of 110 pounds.  She has severe urethral meatal stenosis and is requesting dilation today she was also told the [facility] that she had a rising hCG and I strongly recommended she follow-up with the her gynecologist in [place].  As any absence of uterus and ovaries this may be representative of a tumor.  Nonetheless, I am going to recommend dilation after unremarkable prep and drape I dilated it was extremely tight to 26 English.  Apparently, while in [place] this weekend they decided to go to the [facility] and she said she was admitted for stone the CT disc with and work on I reviewed the report stating there was one tiny punctate nephrolith.  This is not changed.  I went ahead and repeat dilator.  I will see her back in 6 weeks.  She returns today emergently having severe pain wishing to be dilated.  This was accomplished without difficulty.      Past Medical History:        Past Medical History:   Diagnosis Date   • Abdominal pain    • Abdominal swelling    • Hoyos's disease    • Bipolar 1 disorder (CMS/HCC)    • Brain tumor (benign) (CMS/HCC)    • Brain tumor (CMS/HCC)     R Frontal Lobe per pt   • Brain tumor (CMS/HCC) 2014   • Constipation    • DDD (degenerative disc disease), cervical 05/29/2017   • DDD (degenerative disc disease), cervical    • Diarrhea    • Fibromyalgia    • IBS (irritable bowel syndrome)    • Migraine    • Nausea & vomiting    • PONV (postoperative nausea and vomiting)    • PTSD (post-traumatic stress disorder)    • Rectal bleeding          Current Meds:     Current Outpatient Medications      Medication Sig Dispense Refill   • acetaminophen-codeine (TYLENOL #3) 300-30 MG per tablet Take 1 tablet by mouth Every 6 (Six) Hours As Needed for Moderate Pain . 12 tablet 0   • albuterol (PROVENTIL HFA;VENTOLIN HFA) 108 (90 Base) MCG/ACT inhaler Inhale 2 puffs 2 (Two) Times a Day.     • Azelastine HCl 137 MCG/SPRAY solution 1 spray into each nostril As Needed (Allergies).     • busPIRone (BUSPAR) 15 MG tablet Take 15 mg by mouth 3 (three) times a day        • cefuroxime (CEFTIN) 500 MG tablet      • cetirizine (zyrTEC) 10 MG tablet Take 1 tablet by mouth Daily. 30 tablet 0   • cyclobenzaprine (FLEXERIL) 5 MG tablet      • diclofenac (VOLTAREN) 1 % gel gel      • divalproex (DEPAKOTE) 500 MG DR tablet Take 1 tablet by mouth 3 (Three) Times a Day. 90 tablet 2   • fluticasone (VERAMYST) 27.5 MCG/SPRAY nasal spray 2 sprays into each nostril Daily.     • montelukast (SINGULAIR) 10 MG tablet Take 10 mg by mouth Every Night.     • ondansetron (ZOFRAN) 4 MG tablet      • orphenadrine (NORFLEX) 100 MG 12 hr tablet Take 1 tablet by mouth 2 (Two) Times a Day. 20 tablet 0   • oxyCODONE (ROXICODONE) 5 MG immediate release tablet      • oxyCODONE-acetaminophen (Percocet)  MG per tablet Take 1 tablet by mouth Every 6 (Six) Hours As Needed for Moderate Pain . 8 tablet 0   • pantoprazole (PROTONIX) 40 MG EC tablet Take 40 mg by mouth 2 (Two) Times a Day As Needed.     • phenazopyridine (PYRIDIUM) 100 MG tablet      • polyethylene glycol (MIRALAX) powder Take 17 g by mouth Daily. 289 g 0   • promethazine (PHENERGAN) 25 MG tablet      • sertraline (ZOLOFT) 100 MG tablet Take 100 mg by mouth 2 (Two) Times a Day.     • SUMAtriptan (IMITREX) 6 MG/0.5ML solution injection Inject 6 mg under the skin 1 (One) Time.     • tiZANidine (ZANAFLEX) 4 MG tablet      • traMADol (ULTRAM) 50 MG tablet        No current facility-administered medications for this visit.         Allergies:      Allergies   Allergen Reactions   • Ativan  [Lorazepam] Hallucinations     confusion   • Sulfa Antibiotics Shortness Of Breath and Swelling   • Sulfa Antibiotics Anaphylaxis   • Reglan [Metoclopramide] Angioedema   • Compazine [Prochlorperazine Edisylate] Hives   • Demerol [Meperidine] Hives   • Droperidol Itching   • Metoclopramide Swelling   • Toradol [Ketorolac Tromethamine] Hives and Itching   • Toradol [Ketorolac Tromethamine] Hives   • Zofran [Ondansetron Hcl] Rash   • Zosyn [Piperacillin Sod-Tazobactam So] Hives        Past Surgical History:     Past Surgical History:   Procedure Laterality Date   • ANAL SCOPE N/A 7/28/2016    Procedure: ANAL SCOPE;  Surgeon: Kael Lopez MD;  Location: [facility] OR;  Service:    • APPENDECTOMY     • COLONOSCOPY N/A 6/30/2016    Procedure: COLONOSCOPY  CPTCODE:57680;  Surgeon: Jose Antonio Belle III, MD;  Location: [facility] OR;  Service:    • COLONOSCOPY N/A 7/7/2016    Procedure: COLONOSCOPY (32517) CPT;  Surgeon: Jose Antonio Belle III, MD;  Location: [facility] OR;  Service:    • CYSTOSCOPY RETROGRADE PYELOGRAM Bilateral 4/28/2017    Procedure: CYSTOSCOPY RETROGRADE PYELOGRAM;  Surgeon: Mu Blunt MD;  Location: [facility] OR;  Service:    • ENDOSCOPY N/A 6/30/2016    Procedure: ESOPHAGOGASTRODUODENOSCOPY WITH BIOPSY  CPTCODE:45135;  Surgeon: Jose Antonio Belle III, MD;  Location: [facility] OR;  Service:    • HEMORRHOIDECTOMY N/A 7/28/2016    Procedure: HEMORRHOID STAPLING;  Surgeon: Kael Lopez MD;  Location: [facility] OR;  Service:    • HYSTERECTOMY     • KNEE SURGERY     • LAPAROSCOPIC SALPINGOOPHERECTOMY     • PORTACATH PLACEMENT N/A 7/28/2017    Procedure: INSERTION OF PORTACATH;  Surgeon: Celso Arredondo MD;  Location: [facility] OR;  Service:    • SHOULDER SURGERY      3 times         Social History:     Social History     Socioeconomic History   • Marital status:      Spouse name: Not on file   • Number of children: Not on file   • Years of education: Not on file   • Highest education level: Not  on file   Tobacco Use   • Smoking status: Former Smoker     Packs/day: 1.00     Years: 20.00     Pack years: 20.00     Last attempt to quit: 2015     Years since quittin.6   • Smokeless tobacco: Never Used   Substance and Sexual Activity   • Alcohol use: No   • Drug use: Yes     Types: Marijuana     Comment: for pain   • Sexual activity: Defer     Birth control/protection: Surgical   Social History Narrative    ** Merged History Encounter **         Lives in [place], works as a , independent of ADL       Family History:     Family History   Problem Relation Age of Onset   • Crohn's disease Other    • Hypertension Other    • Diabetes Other    • Irritable bowel syndrome Other    • No Known Problems Father    • No Known Problems Mother        Review of Systems:     Review of Systems   Constitutional: Negative.  Negative for activity change, appetite change, chills, diaphoresis, fatigue and unexpected weight change.   HENT: Negative for congestion, dental problem, drooling, ear discharge, ear pain, facial swelling, hearing loss, mouth sores, nosebleeds, postnasal drip, rhinorrhea, sinus pressure, sneezing, sore throat, tinnitus, trouble swallowing and voice change.    Eyes: Negative.  Negative for photophobia, pain, discharge, redness, itching and visual disturbance.   Respiratory: Negative.  Negative for apnea, cough, choking, chest tightness, shortness of breath, wheezing and stridor.    Cardiovascular: Negative.  Negative for chest pain, palpitations and leg swelling.   Gastrointestinal: Negative.  Negative for abdominal distention, abdominal pain, anal bleeding, blood in stool, constipation, diarrhea, nausea, rectal pain and vomiting.   Endocrine: Negative.  Negative for cold intolerance, heat intolerance, polydipsia, polyphagia and polyuria.   Genitourinary: Positive for difficulty urinating.   Musculoskeletal: Negative.  Negative for arthralgias, back pain, gait problem, joint swelling, myalgias,  neck pain and neck stiffness.   Skin: Negative.  Negative for color change, pallor, rash and wound.   Allergic/Immunologic: Negative.  Negative for environmental allergies, food allergies and immunocompromised state.   Neurological: Negative.  Negative for dizziness, tremors, seizures, syncope, facial asymmetry, speech difficulty, weakness, light-headedness, numbness and headaches.   Hematological: Negative.  Negative for adenopathy. Does not bruise/bleed easily.   Psychiatric/Behavioral: Negative for agitation, behavioral problems, confusion, decreased concentration, dysphoric mood, hallucinations, self-injury, sleep disturbance and suicidal ideas. The patient is not nervous/anxious and is not hyperactive.    All other systems reviewed and are negative.      Physical Exam:     Physical Exam   Constitutional: She appears well-developed and well-nourished.   HENT:   Head: Normocephalic and atraumatic.   Right Ear: External ear normal.   Left Ear: External ear normal.   Mouth/Throat: Oropharynx is clear and moist.   Eyes: Pupils are equal, round, and reactive to light. Conjunctivae are normal.   Cardiovascular: Normal rate, regular rhythm, normal heart sounds and intact distal pulses.   Pulmonary/Chest: Effort normal and breath sounds normal.   Abdominal: Soft. Bowel sounds are normal. She exhibits no distension and no mass. There is no tenderness. There is no rebound and no guarding.   Genitourinary: Vagina normal. No vaginal discharge found.   Musculoskeletal: Normal range of motion.   Neurological: She is alert. She has normal reflexes.   Skin: Skin is warm and dry.   Psychiatric: She has a normal mood and affect. Her behavior is normal. Judgment and thought content normal.       I have reviewed the following portions of the patient's history: allergies, current medications, past family history, past medical history, past social history, past surgical history, problem list and ROS and confirm it's  accurate.      Procedure:     urethral dilation-after an appropriate informed consent the patient was brought to the procedure suite.  The urethra was gently anesthetized with 10 cc of 2% viscous Xylocaine jelly.  After an adequate period of topical anesthesia I went ahead and the urethra was gently anesthetized and dilated with [place] sounds from 16-26 Greek sequentially without complication the patient was given gentamicin as prophylaxis with 80 mg  Assessment/Plan:   Urethral stricture-status post dilation given short course of pain medication because of the pain experienced during the procedure  Narcotic pain medication-patient has significant acute pain that I believe would be an indication for the use of narcotic pain medication.  I discussed the significant risks of pain medication and the fact that this will be a short only option and I will give her no more than a three-day supply of pain medication.  And I will not plan long-term medication and that this will be sent to a pain clinic if at all becomes necessary.  We discussed signing a pain medication agreement and the fact that we're going to run a state [facility] review to be sure the patient is not getting pain medication from elsewhere.  If this is the case we will not give pain medication.  As part of the patient's treatment plan of there being prescribed a controlled substance with potential for abuse.  This patient has been wait aware of the appropriate dose of such medications including, the risk for somnolence, limited ability to drive and/or safety and the significant potential for overdose.  It is been made clear that these medications are for the prescribed patient only without concomitant use of alcohol or other sepsis unless prescribed by the medical provider.  Has completed prescribing agreement detailing the terms of continue prescribing him a controlled substance.  Including monitoring Romeo ports, the possibility of urine drug screens  and pedal counts.  The patient is aware that we reviewed LUIS ALBERTO reports on a regular basis and scan them into the chart.  History and physical examination exhibited continued safe and appropriate use of controlled substances.  Also discussed the fact that the new [place] legislation allows only a three-day prescription for pain medication.  In this situation he will be referred to a chronic pain clinic.      .      Patient's There is no height or weight on file to calculate BMI. BMI is within normal parameters. No follow-up required..              This document has been electronically signed by VERENICE FINK MD June 18, 2020 09:55

## 2020-06-18 NOTE — TELEPHONE ENCOUNTER
The pt called after he appt and said that she needed catheters ordered to be sent to her house again they stopped when she lost her insurance. I filled out the form with what cath's the pt uses and sent it in to to [facility]

## 2020-06-26 ENCOUNTER — HOSPITAL ENCOUNTER (EMERGENCY)
Facility: HOSPITAL | Age: 45
Discharge: HOME | End: 2020-06-26
Attending: EMERGENCY MEDICINE | Admitting: EMERGENCY MEDICINE

## 2020-06-26 VITALS
HEIGHT: 68 IN | RESPIRATION RATE: 18 BRPM | BODY MASS INDEX: 18.94 KG/M2 | SYSTOLIC BLOOD PRESSURE: 124 MMHG | HEART RATE: 83 BPM | DIASTOLIC BLOOD PRESSURE: 80 MMHG | WEIGHT: 125 LBS | TEMPERATURE: 98.2 F | OXYGEN SATURATION: 98 %

## 2020-06-26 DIAGNOSIS — G43.901 MIGRAINE WITH STATUS MIGRAINOSUS, NOT INTRACTABLE, UNSPECIFIED MIGRAINE TYPE: Primary | ICD-10-CM

## 2020-06-26 PROCEDURE — 96374 THER/PROPH/DIAG INJ IV PUSH: CPT

## 2020-06-26 PROCEDURE — 96375 TX/PRO/DX INJ NEW DRUG ADDON: CPT

## 2020-06-26 PROCEDURE — 25010000002 BUTORPHANOL PER 1 MG: Performed by: EMERGENCY MEDICINE

## 2020-06-26 PROCEDURE — 99284 EMERGENCY DEPT VISIT MOD MDM: CPT

## 2020-06-26 PROCEDURE — 25010000002 PROMETHAZINE PER 50 MG: Performed by: PHYSICIAN ASSISTANT

## 2020-06-26 RX ORDER — SODIUM CHLORIDE 0.9 % (FLUSH) 0.9 %
10 SYRINGE (ML) INJECTION AS NEEDED
Status: DISCONTINUED | OUTPATIENT
Start: 2020-06-26 | End: 2020-06-26 | Stop reason: HOSPADM

## 2020-06-26 RX ORDER — PROMETHAZINE HYDROCHLORIDE 25 MG/ML
12.5 INJECTION, SOLUTION INTRAMUSCULAR; INTRAVENOUS ONCE
Status: COMPLETED | OUTPATIENT
Start: 2020-06-26 | End: 2020-06-26

## 2020-06-26 RX ADMIN — SODIUM CHLORIDE 1000 ML: 9 INJECTION, SOLUTION INTRAVENOUS at 11:39

## 2020-06-26 RX ADMIN — BUTORPHANOL TARTRATE 2 MG: 2 INJECTION, SOLUTION INTRAMUSCULAR; INTRAVENOUS at 11:39

## 2020-06-26 RX ADMIN — PROMETHAZINE HYDROCHLORIDE 12.5 MG: 25 INJECTION INTRAMUSCULAR; INTRAVENOUS at 11:38

## 2020-06-26 NOTE — ED PROVIDER NOTES
Subjective   45-year-old female presents to the ED today due to a migraine headache.  She has a long history of chronic migraines.  She states this episode started about 5 or 6 days ago.  She states it is right-sided.  It is similar to her previous headaches.  She states she receives Botox injections for her migraines and she is due for another round of Botox in the next couple weeks.  She denies any fever.  She states she has had nausea and vomiting.  She states she is sensitive to light and sound.  She states she tried Imitrex and Phenergan at home but she vomited it up.  She states that she is typically prescribed compound Phenergan that she can rub onto her wrist but she is currently out of it.  She states stress is a large trigger for her migraines.  She states she is supposed to start back to work tomorrow after being furloughed.      History provided by:  Patient  Headache   Pain location:  Frontal and R temporal  Quality:  Dull  Radiates to:  Does not radiate  Severity currently:  7/10  Onset quality:  Gradual  Duration:  6 days  Timing:  Constant  Progression:  Unchanged  Chronicity:  Chronic  Similar to prior headaches: yes    Context: bright light and loud noise    Relieved by:  Nothing  Worsened by:  Light and sound  Ineffective treatments:  Prescription medications  Associated symptoms: nausea and vomiting    Associated symptoms: no abdominal pain, no back pain, no blurred vision, no congestion, no cough, no diarrhea, no dizziness, no drainage, no ear pain, no eye pain, no facial pain, no fatigue, no fever, no focal weakness, no hearing loss, no loss of balance, no myalgias, no near-syncope, no neck pain, no neck stiffness, no numbness, no paresthesias, no photophobia, no seizures, no sinus pressure, no sore throat, no swollen glands, no syncope, no tingling, no URI, no visual change and no weakness        Review of Systems   Constitutional: Negative.  Negative for fatigue and fever.   HENT: Negative  for congestion, ear pain, hearing loss, postnasal drip, sinus pressure and sore throat.    Eyes: Negative for blurred vision, photophobia and pain.   Respiratory: Negative for cough.    Cardiovascular: Negative for syncope and near-syncope.   Gastrointestinal: Positive for nausea and vomiting. Negative for abdominal pain and diarrhea.   Genitourinary: Negative.    Musculoskeletal: Negative for back pain, myalgias, neck pain and neck stiffness.   Skin: Negative.    Neurological: Positive for headaches. Negative for dizziness, focal weakness, seizures, weakness, numbness, paresthesias and loss of balance.   Psychiatric/Behavioral: Negative.    All other systems reviewed and are negative.      Past Medical History:   Diagnosis Date   • Abdominal pain    • Abdominal swelling    • Hoyos's disease    • Bipolar 1 disorder (CMS/HCC)    • Brain tumor (benign) (CMS/HCC)    • Brain tumor (CMS/HCC)     R Frontal Lobe per pt   • Brain tumor (CMS/HCC) 2014   • Constipation    • DDD (degenerative disc disease), cervical 05/29/2017   • DDD (degenerative disc disease), cervical    • Diarrhea    • Fibromyalgia    • IBS (irritable bowel syndrome)    • Migraine    • Nausea & vomiting    • PONV (postoperative nausea and vomiting)    • PTSD (post-traumatic stress disorder)    • Rectal bleeding        Allergies   Allergen Reactions   • Ativan [Lorazepam] Hallucinations     confusion   • Sulfa Antibiotics Shortness Of Breath and Swelling   • Sulfa Antibiotics Anaphylaxis   • Reglan [Metoclopramide] Angioedema   • Compazine [Prochlorperazine Edisylate] Hives   • Demerol [Meperidine] Hives   • Droperidol Itching   • Metoclopramide Swelling   • Toradol [Ketorolac Tromethamine] Hives and Itching   • Toradol [Ketorolac Tromethamine] Hives   • Zofran [Ondansetron Hcl] Rash   • Zosyn [Piperacillin Sod-Tazobactam So] Hives       Past Surgical History:   Procedure Laterality Date   • ANAL SCOPE N/A 7/28/2016    Procedure: ANAL SCOPE;  Surgeon:  Kael Lopez MD;  Location: [facility] OR;  Service:    • APPENDECTOMY     • COLONOSCOPY N/A 2016    Procedure: COLONOSCOPY  CPTCODE:00142;  Surgeon: Jose Antonio Belle III, MD;  Location: [facility] OR;  Service:    • COLONOSCOPY N/A 2016    Procedure: COLONOSCOPY (60364) CPT;  Surgeon: Jose Antonio Belle III, MD;  Location: [facility] OR;  Service:    • CYSTOSCOPY RETROGRADE PYELOGRAM Bilateral 2017    Procedure: CYSTOSCOPY RETROGRADE PYELOGRAM;  Surgeon: Mu Blunt MD;  Location: [facility] OR;  Service:    • ENDOSCOPY N/A 2016    Procedure: ESOPHAGOGASTRODUODENOSCOPY WITH BIOPSY  CPTCODE:29059;  Surgeon: Jose Antonio Belle III, MD;  Location: [facility] OR;  Service:    • HEMORRHOIDECTOMY N/A 2016    Procedure: HEMORRHOID STAPLING;  Surgeon: Kael Lopez MD;  Location: [facility] OR;  Service:    • HYSTERECTOMY     • KNEE SURGERY     • LAPAROSCOPIC SALPINGOOPHERECTOMY     • PORTACATH PLACEMENT N/A 2017    Procedure: INSERTION OF PORTACATH;  Surgeon: Celso Arredondo MD;  Location: [facility] OR;  Service:    • SHOULDER SURGERY      3 times       Family History   Problem Relation Age of Onset   • Crohn's disease Other    • Hypertension Other    • Diabetes Other    • Irritable bowel syndrome Other    • No Known Problems Father    • No Known Problems Mother        Social History     Socioeconomic History   • Marital status:      Spouse name: Not on file   • Number of children: Not on file   • Years of education: Not on file   • Highest education level: Not on file   Tobacco Use   • Smoking status: Former Smoker     Packs/day: 1.00     Years: 20.00     Pack years: 20.00     Last attempt to quit: 2015     Years since quittin.6   • Smokeless tobacco: Never Used   Substance and Sexual Activity   • Alcohol use: No   • Drug use: Yes     Types: Marijuana     Comment: for pain   • Sexual activity: Defer     Birth control/protection: Surgical   Social History Narrative    ** Merged  History Encounter **         Lives in [place], works as a , independent of ADL           Objective   Physical Exam   Constitutional: She is oriented to person, place, and time. She appears well-developed and well-nourished. No distress.   HENT:   Head: Normocephalic and atraumatic.   Right Ear: External ear normal.   Left Ear: External ear normal.   Eyes: Pupils are equal, round, and reactive to light. Conjunctivae and EOM are normal.   Neck: Normal range of motion. Neck supple. No neck rigidity.   No meningeal signs   Cardiovascular: Normal rate, regular rhythm, normal heart sounds and intact distal pulses.   Pulmonary/Chest: Effort normal and breath sounds normal.   Abdominal: Soft. Bowel sounds are normal.   Musculoskeletal: Normal range of motion.   Neurological: She is alert and oriented to person, place, and time.   Sensitive to light   Skin: Skin is warm and dry. Capillary refill takes less than 2 seconds.   Psychiatric: She has a normal mood and affect. Her behavior is normal. Judgment and thought content normal.   Nursing note and vitals reviewed.      Procedures           ED Course  ED Course as of Jun 26 1309   Fri Jun 26, 2020   1245 Patient reports her headache has improved to a 4/10.  She will be discharged home to follow up outpatient and will continue her home medications as prescribed.  She will return to the ED as needed.    [AH]      ED Course User Index  [AH] Kristin Shell PA                                           [facility]  Number of Diagnoses or Management Options  Migraine with status migrainosus, not intractable, unspecified migraine type:   Patient Progress  Patient progress: improved      Final diagnoses:   Migraine with status migrainosus, not intractable, unspecified migraine type            Kristin Shell PA  06/26/20 1303

## 2020-07-12 ENCOUNTER — HOSPITAL ENCOUNTER (EMERGENCY)
Facility: HOSPITAL | Age: 45
Discharge: LEFT BEFORE DISCHARGE | End: 2020-07-12
Attending: EMERGENCY MEDICINE | Admitting: EMERGENCY MEDICINE

## 2020-07-12 VITALS
BODY MASS INDEX: 18.94 KG/M2 | DIASTOLIC BLOOD PRESSURE: 55 MMHG | WEIGHT: 125 LBS | HEART RATE: 78 BPM | RESPIRATION RATE: 18 BRPM | TEMPERATURE: 97.3 F | SYSTOLIC BLOOD PRESSURE: 130 MMHG | OXYGEN SATURATION: 97 % | HEIGHT: 68 IN

## 2020-07-12 DIAGNOSIS — R10.9 FLANK PAIN: Primary | ICD-10-CM

## 2020-07-12 LAB
ALBUMIN SERPL-MCNC: 3.9 G/DL (ref 3.5–5.2)
ALBUMIN/GLOB SERPL: 1.7 G/DL
ALP SERPL-CCNC: 69 U/L (ref 39–117)
ALT SERPL W P-5'-P-CCNC: 32 U/L (ref 1–33)
ANION GAP SERPL CALCULATED.3IONS-SCNC: 9.1 MMOL/L (ref 5–15)
AST SERPL-CCNC: 33 U/L (ref 1–32)
BACTERIA UR QL AUTO: ABNORMAL /HPF
BASOPHILS # BLD AUTO: 0.01 10*3/MM3 (ref 0–0.2)
BASOPHILS NFR BLD AUTO: 0.2 % (ref 0–1.5)
BILIRUB SERPL-MCNC: 0.6 MG/DL (ref 0–1.2)
BILIRUB UR QL STRIP: NEGATIVE
BUN SERPL-MCNC: 8 MG/DL (ref 6–20)
BUN/CREAT SERPL: 15.4 (ref 7–25)
CALCIUM SPEC-SCNC: 8.9 MG/DL (ref 8.6–10.5)
CHLORIDE SERPL-SCNC: 102 MMOL/L (ref 98–107)
CLARITY UR: CLEAR
CO2 SERPL-SCNC: 28.9 MMOL/L (ref 22–29)
COLOR UR: ABNORMAL
CREAT SERPL-MCNC: 0.52 MG/DL (ref 0.57–1)
DEPRECATED RDW RBC AUTO: 43.6 FL (ref 37–54)
EOSINOPHIL # BLD AUTO: 0.14 10*3/MM3 (ref 0–0.4)
EOSINOPHIL NFR BLD AUTO: 3.1 % (ref 0.3–6.2)
ERYTHROCYTE [DISTWIDTH] IN BLOOD BY AUTOMATED COUNT: 13.1 % (ref 12.3–15.4)
GFR SERPL CREATININE-BSD FRML MDRD: 128 ML/MIN/1.73
GLOBULIN UR ELPH-MCNC: 2.3 GM/DL
GLUCOSE SERPL-MCNC: 92 MG/DL (ref 65–99)
GLUCOSE UR STRIP-MCNC: NEGATIVE MG/DL
HCT VFR BLD AUTO: 39.2 % (ref 34–46.6)
HGB BLD-MCNC: 13 G/DL (ref 12–15.9)
HGB UR QL STRIP.AUTO: ABNORMAL
HYALINE CASTS UR QL AUTO: ABNORMAL /LPF
IMM GRANULOCYTES # BLD AUTO: 0.02 10*3/MM3 (ref 0–0.05)
IMM GRANULOCYTES NFR BLD AUTO: 0.4 % (ref 0–0.5)
KETONES UR QL STRIP: NEGATIVE
LEUKOCYTE ESTERASE UR QL STRIP.AUTO: ABNORMAL
LYMPHOCYTES # BLD AUTO: 1.44 10*3/MM3 (ref 0.7–3.1)
LYMPHOCYTES NFR BLD AUTO: 32.1 % (ref 19.6–45.3)
MCH RBC QN AUTO: 30.2 PG (ref 26.6–33)
MCHC RBC AUTO-ENTMCNC: 33.2 G/DL (ref 31.5–35.7)
MCV RBC AUTO: 91 FL (ref 79–97)
MONOCYTES # BLD AUTO: 0.3 10*3/MM3 (ref 0.1–0.9)
MONOCYTES NFR BLD AUTO: 6.7 % (ref 5–12)
NEUTROPHILS NFR BLD AUTO: 2.57 10*3/MM3 (ref 1.7–7)
NEUTROPHILS NFR BLD AUTO: 57.5 % (ref 42.7–76)
NITRITE UR QL STRIP: NEGATIVE
NRBC BLD AUTO-RTO: 0 /100 WBC (ref 0–0.2)
PH UR STRIP.AUTO: 8 [PH] (ref 5–8)
PLATELET # BLD AUTO: 121 10*3/MM3 (ref 140–450)
PMV BLD AUTO: 10.9 FL (ref 6–12)
POTASSIUM SERPL-SCNC: 3.7 MMOL/L (ref 3.5–5.2)
PROT SERPL-MCNC: 6.2 G/DL (ref 6–8.5)
PROT UR QL STRIP: NEGATIVE
RBC # BLD AUTO: 4.31 10*6/MM3 (ref 3.77–5.28)
RBC # UR: ABNORMAL /HPF
REF LAB TEST METHOD: ABNORMAL
SODIUM SERPL-SCNC: 140 MMOL/L (ref 136–145)
SP GR UR STRIP: 1.01 (ref 1–1.03)
SQUAMOUS #/AREA URNS HPF: ABNORMAL /HPF
UROBILINOGEN UR QL STRIP: ABNORMAL
WBC # BLD AUTO: 4.48 10*3/MM3 (ref 3.4–10.8)
WBC UR QL AUTO: ABNORMAL /HPF

## 2020-07-12 PROCEDURE — 25010000002 PROMETHAZINE PER 50 MG: Performed by: EMERGENCY MEDICINE

## 2020-07-12 PROCEDURE — 96374 THER/PROPH/DIAG INJ IV PUSH: CPT

## 2020-07-12 PROCEDURE — 85025 COMPLETE CBC W/AUTO DIFF WBC: CPT | Performed by: PHYSICIAN ASSISTANT

## 2020-07-12 PROCEDURE — 99283 EMERGENCY DEPT VISIT LOW MDM: CPT

## 2020-07-12 PROCEDURE — 80053 COMPREHEN METABOLIC PANEL: CPT | Performed by: PHYSICIAN ASSISTANT

## 2020-07-12 PROCEDURE — 81001 URINALYSIS AUTO W/SCOPE: CPT | Performed by: PHYSICIAN ASSISTANT

## 2020-07-12 PROCEDURE — 36415 COLL VENOUS BLD VENIPUNCTURE: CPT

## 2020-07-12 RX ORDER — PROMETHAZINE HYDROCHLORIDE 25 MG/ML
12.5 INJECTION, SOLUTION INTRAMUSCULAR; INTRAVENOUS ONCE
Status: COMPLETED | OUTPATIENT
Start: 2020-07-12 | End: 2020-07-12

## 2020-07-12 RX ORDER — HYDROCODONE BITARTRATE AND ACETAMINOPHEN 7.5; 325 MG/1; MG/1
1 TABLET ORAL ONCE
Status: COMPLETED | OUTPATIENT
Start: 2020-07-12 | End: 2020-07-12

## 2020-07-12 RX ADMIN — PROMETHAZINE HYDROCHLORIDE 12.5 MG: 25 INJECTION INTRAMUSCULAR; INTRAVENOUS at 13:17

## 2020-07-12 RX ADMIN — HYDROCODONE BITARTRATE AND ACETAMINOPHEN 1 TABLET: 7.5; 325 TABLET ORAL at 13:21

## 2020-07-12 NOTE — ED NOTES
Patient states she is ready to leave and wants to sign out AMA.  Patient informed of improving condition and  Consequences of signing out AMA.  Patient verbalizes understanding     Keith Chery, RN  07/12/20 7827

## 2020-07-13 ENCOUNTER — OFFICE VISIT (OUTPATIENT)
Dept: UROLOGY | Facility: CLINIC | Age: 45
End: 2020-07-13

## 2020-07-13 VITALS — BODY MASS INDEX: 18.94 KG/M2 | HEIGHT: 68 IN | TEMPERATURE: 98.6 F | WEIGHT: 125 LBS

## 2020-07-13 DIAGNOSIS — N35.028 OTHER POST-TRAUMATIC URETHRAL STRICTURE, FEMALE: ICD-10-CM

## 2020-07-13 DIAGNOSIS — Z48.816 AFTERCARE FOLLOWING SURGERY OF THE GENITOURINARY SYSTEM: Primary | ICD-10-CM

## 2020-07-13 DIAGNOSIS — R31.0 GROSS HEMATURIA: ICD-10-CM

## 2020-07-13 PROCEDURE — 96372 THER/PROPH/DIAG INJ SC/IM: CPT | Performed by: UROLOGY

## 2020-07-13 PROCEDURE — 99213 OFFICE O/P EST LOW 20 MIN: CPT | Performed by: UROLOGY

## 2020-07-13 PROCEDURE — 53661 DILATION OF URETHRA: CPT | Performed by: UROLOGY

## 2020-07-13 RX ORDER — GENTAMICIN SULFATE 40 MG/ML
80 INJECTION, SOLUTION INTRAMUSCULAR; INTRAVENOUS ONCE
Status: COMPLETED | OUTPATIENT
Start: 2020-07-13 | End: 2020-07-13

## 2020-07-13 RX ORDER — OXYCODONE AND ACETAMINOPHEN 10; 325 MG/1; MG/1
1 TABLET ORAL EVERY 6 HOURS PRN
Qty: 8 TABLET | Refills: 0 | Status: SHIPPED | OUTPATIENT
Start: 2020-07-13 | End: 2020-08-14 | Stop reason: SDUPTHER

## 2020-07-13 RX ADMIN — GENTAMICIN SULFATE 80 MG: 40 INJECTION, SOLUTION INTRAMUSCULAR; INTRAVENOUS at 15:19

## 2020-07-13 NOTE — PROGRESS NOTES
Chief Complaint:          Chief Complaint   Patient presents with   • Urethral stricture     dilation        HPI:   45 y.o. female  returns today accompanied by her  she is not been seen for several months because she lost her insurance.  She is lost an excessive amount of weight she looks almost moribund.  She states she is actually gained 15 pounds from her current weight of 110 pounds.  She has severe urethral meatal stenosis and is requesting dilation today she was also told the [facility] that she had a rising hCG and I strongly recommended she follow-up with the her gynecologist in [place].  As any absence of uterus and ovaries this may be representative of a tumor.  Nonetheless, I am going to recommend dilation after unremarkable prep and drape I dilated it was extremely tight to 26 [place].  Apparently, while in [place] this weekend they decided to go to the [facility] and she said she was admitted for stone the CT disc with and work on I reviewed the report stating there was one tiny punctate nephrolith.  This is not changed.  I went ahead and repeat dilator.  I will see her back in 6 weeks.  She returns today emergently having severe pain wishing to be dilated.  This was accomplished without difficulty.      Past Medical History:        Past Medical History:   Diagnosis Date   • Abdominal pain    • Abdominal swelling    • Hoyos's disease    • Bipolar 1 disorder (CMS/HCC)    • Brain tumor (benign) (CMS/HCC)    • Brain tumor (CMS/HCC)     R Frontal Lobe per pt   • Brain tumor (CMS/HCC) 2014   • Constipation    • DDD (degenerative disc disease), cervical 05/29/2017   • DDD (degenerative disc disease), cervical    • Diarrhea    • Fibromyalgia    • IBS (irritable bowel syndrome)    • Migraine    • Nausea & vomiting    • PONV (postoperative nausea and vomiting)    • PTSD (post-traumatic stress disorder)    • Rectal bleeding          Current Meds:     Current Outpatient  Medications   Medication Sig Dispense Refill   • acetaminophen-codeine (TYLENOL #3) 300-30 MG per tablet Take 1 tablet by mouth Every 6 (Six) Hours As Needed for Moderate Pain . 12 tablet 0   • albuterol (PROVENTIL HFA;VENTOLIN HFA) 108 (90 Base) MCG/ACT inhaler Inhale 2 puffs 2 (Two) Times a Day.     • Azelastine HCl 137 MCG/SPRAY solution 1 spray into each nostril As Needed (Allergies).     • busPIRone (BUSPAR) 15 MG tablet Take 15 mg by mouth 3 (three) times a day        • cefuroxime (CEFTIN) 500 MG tablet      • cetirizine (zyrTEC) 10 MG tablet Take 1 tablet by mouth Daily. 30 tablet 0   • cyclobenzaprine (FLEXERIL) 5 MG tablet      • diclofenac (VOLTAREN) 1 % gel gel      • divalproex (DEPAKOTE) 500 MG DR tablet Take 1 tablet by mouth 3 (Three) Times a Day. 90 tablet 2   • fluticasone (VERAMYST) 27.5 MCG/SPRAY nasal spray 2 sprays into each nostril Daily.     • montelukast (SINGULAIR) 10 MG tablet Take 10 mg by mouth Every Night.     • ondansetron (ZOFRAN) 4 MG tablet      • orphenadrine (NORFLEX) 100 MG 12 hr tablet Take 1 tablet by mouth 2 (Two) Times a Day. 20 tablet 0   • oxyCODONE (ROXICODONE) 5 MG immediate release tablet      • oxyCODONE-acetaminophen (Percocet)  MG per tablet Take 1 tablet by mouth Every 6 (Six) Hours As Needed for Moderate Pain . 8 tablet 0   • pantoprazole (PROTONIX) 40 MG EC tablet Take 40 mg by mouth 2 (Two) Times a Day As Needed.     • phenazopyridine (PYRIDIUM) 100 MG tablet      • polyethylene glycol (MIRALAX) powder Take 17 g by mouth Daily. 289 g 0   • promethazine (PHENERGAN) 25 MG tablet      • sertraline (ZOLOFT) 100 MG tablet Take 100 mg by mouth 2 (Two) Times a Day.     • SUMAtriptan (IMITREX) 6 MG/0.5ML solution injection Inject 6 mg under the skin 1 (One) Time.     • tiZANidine (ZANAFLEX) 4 MG tablet      • traMADol (ULTRAM) 50 MG tablet        No current facility-administered medications for this visit.         Allergies:      Allergies   Allergen Reactions   •  Ativan [Lorazepam] Hallucinations     confusion   • Sulfa Antibiotics Shortness Of Breath and Swelling   • Sulfa Antibiotics Anaphylaxis   • Reglan [Metoclopramide] Angioedema   • Compazine [Prochlorperazine Edisylate] Hives   • Demerol [Meperidine] Hives   • Droperidol Itching   • Metoclopramide Swelling   • Toradol [Ketorolac Tromethamine] Hives and Itching   • Toradol [Ketorolac Tromethamine] Hives   • Zofran [Ondansetron Hcl] Rash   • Zosyn [Piperacillin Sod-Tazobactam So] Hives        Past Surgical History:     Past Surgical History:   Procedure Laterality Date   • ANAL SCOPE N/A 7/28/2016    Procedure: ANAL SCOPE;  Surgeon: Kael Lopez MD;  Location: [facility] OR;  Service:    • APPENDECTOMY     • COLONOSCOPY N/A 6/30/2016    Procedure: COLONOSCOPY  CPTCODE:18393;  Surgeon: Jose Antonio Belle III, MD;  Location: [facility] OR;  Service:    • COLONOSCOPY N/A 7/7/2016    Procedure: COLONOSCOPY (73318) CPT;  Surgeon: Jose Antonio Belle III, MD;  Location: [facility] OR;  Service:    • CYSTOSCOPY RETROGRADE PYELOGRAM Bilateral 4/28/2017    Procedure: CYSTOSCOPY RETROGRADE PYELOGRAM;  Surgeon: Mu Blunt MD;  Location: [facility] OR;  Service:    • ENDOSCOPY N/A 6/30/2016    Procedure: ESOPHAGOGASTRODUODENOSCOPY WITH BIOPSY  CPTCODE:35679;  Surgeon: Jose Antonio Belle III, MD;  Location: [facility] OR;  Service:    • HEMORRHOIDECTOMY N/A 7/28/2016    Procedure: HEMORRHOID STAPLING;  Surgeon: Kael Lopez MD;  Location: [facility] OR;  Service:    • HYSTERECTOMY     • KNEE SURGERY     • LAPAROSCOPIC SALPINGOOPHERECTOMY     • PORTACATH PLACEMENT N/A 7/28/2017    Procedure: INSERTION OF PORTACATH;  Surgeon: Celso Arredondo MD;  Location: [facility] OR;  Service:    • SHOULDER SURGERY      3 times         Social History:     Social History     Socioeconomic History   • Marital status:      Spouse name: Not on file   • Number of children: Not on file   • Years of education: Not on file   • Highest education  level: Not on file   Tobacco Use   • Smoking status: Former Smoker     Packs/day: 1.00     Years: 20.00     Pack years: 20.00     Last attempt to quit: 2015     Years since quittin.7   • Smokeless tobacco: Never Used   Substance and Sexual Activity   • Alcohol use: No   • Drug use: Yes     Types: Marijuana     Comment: for pain   • Sexual activity: Defer     Birth control/protection: Surgical   Social History Narrative    ** Merged History Encounter **         Lives in [place], works as a , independent of ADL       Family History:     Family History   Problem Relation Age of Onset   • Crohn's disease Other    • Hypertension Other    • Diabetes Other    • Irritable bowel syndrome Other    • No Known Problems Father    • No Known Problems Mother        Review of Systems:     Review of Systems   Constitutional: Negative.  Negative for activity change, appetite change, chills, diaphoresis, fatigue and unexpected weight change.   HENT: Negative for congestion, dental problem, drooling, ear discharge, ear pain, facial swelling, hearing loss, mouth sores, nosebleeds, postnasal drip, rhinorrhea, sinus pressure, sneezing, sore throat, tinnitus, trouble swallowing and voice change.    Eyes: Negative.  Negative for photophobia, pain, discharge, redness, itching and visual disturbance.   Respiratory: Negative.  Negative for apnea, cough, choking, chest tightness, shortness of breath, wheezing and stridor.    Cardiovascular: Negative.  Negative for chest pain, palpitations and leg swelling.   Gastrointestinal: Negative.  Negative for abdominal distention, abdominal pain, anal bleeding, blood in stool, constipation, diarrhea, nausea, rectal pain and vomiting.   Endocrine: Negative.  Negative for cold intolerance, heat intolerance, polydipsia, polyphagia and polyuria.   Genitourinary: Positive for difficulty urinating and frequency.   Musculoskeletal: Negative.  Negative for arthralgias, back pain, gait problem,  joint swelling, myalgias, neck pain and neck stiffness.   Skin: Negative.  Negative for color change, pallor, rash and wound.   Allergic/Immunologic: Negative.  Negative for environmental allergies, food allergies and immunocompromised state.   Neurological: Negative.  Negative for dizziness, tremors, seizures, syncope, facial asymmetry, speech difficulty, weakness, light-headedness, numbness and headaches.   Hematological: Negative.  Negative for adenopathy. Does not bruise/bleed easily.   Psychiatric/Behavioral: Negative for agitation, behavioral problems, confusion, decreased concentration, dysphoric mood, hallucinations, self-injury, sleep disturbance and suicidal ideas. The patient is not nervous/anxious and is not hyperactive.    All other systems reviewed and are negative.      Physical Exam:     Physical Exam   Constitutional: She appears well-developed and well-nourished.   HENT:   Head: Normocephalic and atraumatic.   Right Ear: External ear normal.   Left Ear: External ear normal.   Mouth/Throat: Oropharynx is clear and moist.   Eyes: Pupils are equal, round, and reactive to light. Conjunctivae are normal.   Cardiovascular: Normal rate, regular rhythm, normal heart sounds and intact distal pulses.   Pulmonary/Chest: Effort normal and breath sounds normal.   Abdominal: Soft. Bowel sounds are normal. She exhibits no distension and no mass. There is no tenderness. There is no rebound and no guarding.   Genitourinary: Vagina normal. No vaginal discharge found.   Musculoskeletal: Normal range of motion.   Neurological: She is alert. She has normal reflexes.   Skin: Skin is warm and dry.   Psychiatric: She has a normal mood and affect. Her behavior is normal. Judgment and thought content normal.       I have reviewed the following portions of the patient's history: allergies, current medications, past family history, past medical history, past social history, past surgical history, problem list and ROS and  confirm it's accurate.      Procedure:   urethral dilation-after an appropriate informed consent the patient was brought to the procedure suite.  The urethra was gently anesthetized with 10 cc of 2% viscous Xylocaine jelly.  After an adequate period of topical anesthesia I went ahead and the urethra was gently anesthetized and dilated with [place] sounds from 16-26 [place] sequentially without complication the patient was given gentamicin as prophylaxis with 80 mg    Assessment/Plan:   Urethral stricture-status post dilation  Narcotic pain medication-patient has significant acute pain that I believe would be an indication for the use of narcotic pain medication.  I discussed the significant risks of pain medication and the fact that this will be a short only option and I will give her no more than a three-day supply of pain medication.  And I will not plan long-term medication and that this will be sent to a pain clinic if at all becomes necessary.  We discussed signing a pain medication agreement and the fact that we're going to run a state [facility] review to be sure the patient is not getting pain medication from elsewhere.  If this is the case we will not give pain medication.  As part of the patient's treatment plan of there being prescribed a controlled substance with potential for abuse.  This patient has been wait aware of the appropriate dose of such medications including, the risk for somnolence, limited ability to drive and/or safety and the significant potential for overdose.  It is been made clear that these medications are for the prescribed patient only without concomitant use of alcohol or other sepsis unless prescribed by the medical provider.  Has completed prescribing agreement detailing the terms of continue prescribing him a controlled substance.  Including monitoring Luis Alberto ports, the possibility of urine drug screens and pedal counts.  The patient is aware that we reviewed LUIS ALBERTO reports on a  regular basis and scan them into the chart.  History and physical examination exhibited continued safe and appropriate use of controlled substances.  Also discussed the fact that the new [place] legislation allows only a three-day prescription for pain medication.  In this situation he will be referred to a chronic pain clinic.  Given 2 days of pain medication after her procedure            Patient's Body mass index is 19.01 kg/m². BMI is within normal parameters. No follow-up required..              This document has been electronically signed by VERENICE FINK MD July 13, 2020 14:26

## 2020-07-20 NOTE — ED PROVIDER NOTES
Subjective     History provided by:  Spouse  Flank Pain   Pain location:  R flank  Pain quality: sharp and shooting    Pain radiates to:  R flank  Pain severity:  Moderate  Onset quality:  Sudden  Duration:  2 days  Timing:  Constant  Progression:  Worsening  Chronicity:  Recurrent  Relieved by:  Nothing  Worsened by:  Urination and vomiting  Associated symptoms: hematuria and nausea    Associated symptoms: no chest pain, no chills, no cough, no diarrhea, no dysuria, no fever, no shortness of breath, no sore throat and no vomiting        Review of Systems   Constitutional: Positive for appetite change. Negative for chills and fever.   HENT: Negative for congestion, ear pain and sore throat.    Respiratory: Negative for cough, shortness of breath and wheezing.    Cardiovascular: Negative for chest pain.   Gastrointestinal: Positive for abdominal pain and nausea. Negative for diarrhea and vomiting.   Genitourinary: Positive for flank pain and hematuria. Negative for dysuria.   Skin: Negative for rash.   Neurological: Negative for headaches.   Psychiatric/Behavioral: The patient is not nervous/anxious.    All other systems reviewed and are negative.      Past Medical History:   Diagnosis Date   • Abdominal pain    • Abdominal swelling    • Hoyos's disease    • Bipolar 1 disorder (CMS/HCC)    • Brain tumor (benign) (CMS/HCC)    • Brain tumor (CMS/HCC)     R Frontal Lobe per pt   • Brain tumor (CMS/HCC) 2014   • Constipation    • DDD (degenerative disc disease), cervical 05/29/2017   • DDD (degenerative disc disease), cervical    • Diarrhea    • Fibromyalgia    • IBS (irritable bowel syndrome)    • Migraine    • Nausea & vomiting    • PONV (postoperative nausea and vomiting)    • PTSD (post-traumatic stress disorder)    • Rectal bleeding        Allergies   Allergen Reactions   • Ativan [Lorazepam] Hallucinations     confusion   • Sulfa Antibiotics Shortness Of Breath and Swelling   • Sulfa Antibiotics Anaphylaxis   •  Reglan [Metoclopramide] Angioedema   • Compazine [Prochlorperazine Edisylate] Hives   • Demerol [Meperidine] Hives   • Droperidol Itching   • Metoclopramide Swelling   • Toradol [Ketorolac Tromethamine] Hives and Itching   • Toradol [Ketorolac Tromethamine] Hives   • Zofran [Ondansetron Hcl] Rash   • Zosyn [Piperacillin Sod-Tazobactam So] Hives       Past Surgical History:   Procedure Laterality Date   • ANAL SCOPE N/A 7/28/2016    Procedure: ANAL SCOPE;  Surgeon: Kael Lopez MD;  Location: [facility] OR;  Service:    • APPENDECTOMY     • COLONOSCOPY N/A 6/30/2016    Procedure: COLONOSCOPY  CPTCODE:59340;  Surgeon: Jose Antonio Belle III, MD;  Location: [facility] OR;  Service:    • COLONOSCOPY N/A 7/7/2016    Procedure: COLONOSCOPY (07884) CPT;  Surgeon: Jose Antonio Belle III, MD;  Location: [facility] OR;  Service:    • CYSTOSCOPY RETROGRADE PYELOGRAM Bilateral 4/28/2017    Procedure: CYSTOSCOPY RETROGRADE PYELOGRAM;  Surgeon: Mu Blunt MD;  Location: [facility] OR;  Service:    • ENDOSCOPY N/A 6/30/2016    Procedure: ESOPHAGOGASTRODUODENOSCOPY WITH BIOPSY  CPTCODE:34613;  Surgeon: Jose Antonio Belle III, MD;  Location: [facility] OR;  Service:    • HEMORRHOIDECTOMY N/A 7/28/2016    Procedure: HEMORRHOID STAPLING;  Surgeon: Kael Lopez MD;  Location: [facility] OR;  Service:    • HYSTERECTOMY     • KNEE SURGERY     • LAPAROSCOPIC SALPINGOOPHERECTOMY     • PORTACATH PLACEMENT N/A 7/28/2017    Procedure: INSERTION OF PORTACATH;  Surgeon: Celso Arredondo MD;  Location: [facility] OR;  Service:    • SHOULDER SURGERY      3 times       Family History   Problem Relation Age of Onset   • Crohn's disease Other    • Hypertension Other    • Diabetes Other    • Irritable bowel syndrome Other    • No Known Problems Father    • No Known Problems Mother        Social History     Socioeconomic History   • Marital status:      Spouse name: Not on file   • Number of children: Not on file   • Years of education: Not on  file   • Highest education level: Not on file   Tobacco Use   • Smoking status: Former Smoker     Packs/day: 1.00     Years: 20.00     Pack years: 20.00     Last attempt to quit: 2015     Years since quittin.7   • Smokeless tobacco: Never Used   Substance and Sexual Activity   • Alcohol use: No   • Drug use: Yes     Types: Marijuana     Comment: for pain   • Sexual activity: Defer     Birth control/protection: Surgical   Social History Narrative    ** Merged History Encounter **         Lives in [place], works as a , independent of ADL           Objective   Physical Exam   Constitutional: She is oriented to person, place, and time. She appears well-developed and well-nourished.   HENT:   Head: Normocephalic.   Mouth/Throat: Oropharynx is clear and moist.   Neck: Neck supple.   Cardiovascular: Normal rate and regular rhythm.   Pulmonary/Chest: Effort normal and breath sounds normal.   Abdominal: Soft. Bowel sounds are normal. There is no tenderness.   Musculoskeletal: Normal range of motion.   Neurological: She is alert and oriented to person, place, and time.   Skin: Skin is warm and dry.   Psychiatric: She has a normal mood and affect. Her behavior is normal. Judgment and thought content normal.   Nursing note and vitals reviewed.      Procedures           ED Course  ED Course as of 14   Sun 2020   1338 Her  states that he needs to leave to go to work or he is going to get fired.   Wants to leave ama     [LC]      ED Course User Index  [LC] Xenia Garza PA                                           [facility]    Final diagnoses:   Flank pain            Xenia Garza PA  [phone]

## 2020-08-14 ENCOUNTER — OFFICE VISIT (OUTPATIENT)
Dept: UROLOGY | Facility: CLINIC | Age: 45
End: 2020-08-14

## 2020-08-14 DIAGNOSIS — R31.0 GROSS HEMATURIA: ICD-10-CM

## 2020-08-14 DIAGNOSIS — R31.9 URINARY TRACT INFECTION WITH HEMATURIA, SITE UNSPECIFIED: Primary | ICD-10-CM

## 2020-08-14 DIAGNOSIS — N39.0 URINARY TRACT INFECTION WITH HEMATURIA, SITE UNSPECIFIED: Primary | ICD-10-CM

## 2020-08-14 DIAGNOSIS — N35.028 OTHER POST-TRAUMATIC URETHRAL STRICTURE, FEMALE: ICD-10-CM

## 2020-08-14 PROCEDURE — 96372 THER/PROPH/DIAG INJ SC/IM: CPT | Performed by: UROLOGY

## 2020-08-14 PROCEDURE — 53661 DILATION OF URETHRA: CPT | Performed by: UROLOGY

## 2020-08-14 PROCEDURE — 99213 OFFICE O/P EST LOW 20 MIN: CPT | Performed by: UROLOGY

## 2020-08-14 RX ORDER — GENTAMICIN SULFATE 40 MG/ML
80 INJECTION, SOLUTION INTRAMUSCULAR; INTRAVENOUS ONCE
Status: COMPLETED | OUTPATIENT
Start: 2020-08-14 | End: 2020-08-14

## 2020-08-14 RX ORDER — OXYCODONE AND ACETAMINOPHEN 10; 325 MG/1; MG/1
1 TABLET ORAL EVERY 6 HOURS PRN
Qty: 8 TABLET | Refills: 0 | Status: SHIPPED | OUTPATIENT
Start: 2020-08-14 | End: 2020-09-15 | Stop reason: SDUPTHER

## 2020-08-14 RX ADMIN — GENTAMICIN SULFATE 80 MG: 40 INJECTION, SOLUTION INTRAMUSCULAR; INTRAVENOUS at 14:36

## 2020-08-14 NOTE — PROGRESS NOTES
Chief Complaint:          Chief Complaint   Patient presents with   • Uretheral Dilation       HPI:   45 y.o. female   returns today accompanied by her  she is not been seen for several months because she lost her insurance.  She is lost an excessive amount of weight she looks almost moribund.  She states she is actually gained 15 pounds from her current weight of 110 pounds.  She has severe urethral meatal stenosis and is requesting dilation today she was also told the [facility] that she had a rising hCG and I strongly recommended she follow-up with the her gynecologist in [place].  As any absence of uterus and ovaries this may be representative of a tumor.  Nonetheless, I am going to recommend dilation after unremarkable prep and drape I dilated it was extremely tight to 26 [place].  Apparently, while in [place] this weekend they decided to go to the [facility] and she said she was admitted for stone the CT disc with and work on I reviewed the report stating there was one tiny punctate nephrolith.  This is not changed.  I went ahead and repeat dilator.  I will see her back in 6 weeks.  She returns today emergently having severe pain wishing to be dilated.  This was accomplished without difficulty.      Past Medical History:        Past Medical History:   Diagnosis Date   • Abdominal pain    • Abdominal swelling    • Hoyos's disease    • Bipolar 1 disorder (CMS/HCC)    • Brain tumor (benign) (CMS/HCC)    • Brain tumor (CMS/HCC)     R Frontal Lobe per pt   • Brain tumor (CMS/HCC) 2014   • Constipation    • DDD (degenerative disc disease), cervical 05/29/2017   • DDD (degenerative disc disease), cervical    • Diarrhea    • Fibromyalgia    • IBS (irritable bowel syndrome)    • Migraine    • Nausea & vomiting    • PONV (postoperative nausea and vomiting)    • PTSD (post-traumatic stress disorder)    • Rectal bleeding          Current Meds:     Current Outpatient Medications      Medication Sig Dispense Refill   • acetaminophen-codeine (TYLENOL #3) 300-30 MG per tablet Take 1 tablet by mouth Every 6 (Six) Hours As Needed for Moderate Pain . 12 tablet 0   • albuterol (PROVENTIL HFA;VENTOLIN HFA) 108 (90 Base) MCG/ACT inhaler Inhale 2 puffs 2 (Two) Times a Day.     • Azelastine HCl 137 MCG/SPRAY solution 1 spray into each nostril As Needed (Allergies).     • busPIRone (BUSPAR) 15 MG tablet Take 15 mg by mouth 3 (three) times a day        • cefuroxime (CEFTIN) 500 MG tablet      • cetirizine (zyrTEC) 10 MG tablet Take 1 tablet by mouth Daily. 30 tablet 0   • cyclobenzaprine (FLEXERIL) 5 MG tablet      • diclofenac (VOLTAREN) 1 % gel gel      • divalproex (DEPAKOTE) 500 MG DR tablet Take 1 tablet by mouth 3 (Three) Times a Day. 90 tablet 2   • fluticasone (VERAMYST) 27.5 MCG/SPRAY nasal spray 2 sprays into each nostril Daily.     • montelukast (SINGULAIR) 10 MG tablet Take 10 mg by mouth Every Night.     • ondansetron (ZOFRAN) 4 MG tablet      • orphenadrine (NORFLEX) 100 MG 12 hr tablet Take 1 tablet by mouth 2 (Two) Times a Day. 20 tablet 0   • oxyCODONE (ROXICODONE) 5 MG immediate release tablet      • oxyCODONE-acetaminophen (Percocet)  MG per tablet Take 1 tablet by mouth Every 6 (Six) Hours As Needed for Moderate Pain . 8 tablet 0   • pantoprazole (PROTONIX) 40 MG EC tablet Take 40 mg by mouth 2 (Two) Times a Day As Needed.     • phenazopyridine (PYRIDIUM) 100 MG tablet      • polyethylene glycol (MIRALAX) powder Take 17 g by mouth Daily. 289 g 0   • promethazine (PHENERGAN) 25 MG tablet      • sertraline (ZOLOFT) 100 MG tablet Take 100 mg by mouth 2 (Two) Times a Day.     • SUMAtriptan (IMITREX) 6 MG/0.5ML solution injection Inject 6 mg under the skin 1 (One) Time.     • tiZANidine (ZANAFLEX) 4 MG tablet      • traMADol (ULTRAM) 50 MG tablet        No current facility-administered medications for this visit.         Allergies:      Allergies   Allergen Reactions   • Ativan  [Lorazepam] Hallucinations     confusion   • Sulfa Antibiotics Shortness Of Breath and Swelling   • Sulfa Antibiotics Anaphylaxis   • Reglan [Metoclopramide] Angioedema   • Compazine [Prochlorperazine Edisylate] Hives   • Demerol [Meperidine] Hives   • Droperidol Itching   • Metoclopramide Swelling   • Toradol [Ketorolac Tromethamine] Hives and Itching   • Toradol [Ketorolac Tromethamine] Hives   • Zofran [Ondansetron Hcl] Rash   • Zosyn [Piperacillin Sod-Tazobactam So] Hives        Past Surgical History:     Past Surgical History:   Procedure Laterality Date   • ANAL SCOPE N/A 7/28/2016    Procedure: ANAL SCOPE;  Surgeon: Kael Lopez MD;  Location: [facility] OR;  Service:    • APPENDECTOMY     • COLONOSCOPY N/A 6/30/2016    Procedure: COLONOSCOPY  CPTCODE:77159;  Surgeon: Jose Antonio Belle III, MD;  Location: [facility] OR;  Service:    • COLONOSCOPY N/A 7/7/2016    Procedure: COLONOSCOPY (41656) CPT;  Surgeon: Jose Antonio Belle III, MD;  Location: [facility] OR;  Service:    • CYSTOSCOPY RETROGRADE PYELOGRAM Bilateral 4/28/2017    Procedure: CYSTOSCOPY RETROGRADE PYELOGRAM;  Surgeon: Mu Blunt MD;  Location: [facility] OR;  Service:    • ENDOSCOPY N/A 6/30/2016    Procedure: ESOPHAGOGASTRODUODENOSCOPY WITH BIOPSY  CPTCODE:15293;  Surgeon: Jose Antonio Belle III, MD;  Location: [facility] OR;  Service:    • HEMORRHOIDECTOMY N/A 7/28/2016    Procedure: HEMORRHOID STAPLING;  Surgeon: Kael Lopez MD;  Location: [facility] OR;  Service:    • HYSTERECTOMY     • KNEE SURGERY     • LAPAROSCOPIC SALPINGOOPHERECTOMY     • PORTACATH PLACEMENT N/A 7/28/2017    Procedure: INSERTION OF PORTACATH;  Surgeon: Celso Arredondo MD;  Location: [facility] OR;  Service:    • SHOULDER SURGERY      3 times         Social History:     Social History     Socioeconomic History   • Marital status:      Spouse name: Not on file   • Number of children: Not on file   • Years of education: Not on file   • Highest education level: Not  on file   Tobacco Use   • Smoking status: Former Smoker     Packs/day: 1.00     Years: 20.00     Pack years: 20.00     Last attempt to quit: 2015     Years since quittin.7   • Smokeless tobacco: Never Used   Substance and Sexual Activity   • Alcohol use: No   • Drug use: Yes     Types: Marijuana     Comment: for pain   • Sexual activity: Defer     Birth control/protection: Surgical   Social History Narrative    ** Merged History Encounter **         Lives in [place], works as a , independent of ADL       Family History:     Family History   Problem Relation Age of Onset   • Crohn's disease Other    • Hypertension Other    • Diabetes Other    • Irritable bowel syndrome Other    • No Known Problems Father    • No Known Problems Mother        Review of Systems:     Review of Systems   Constitutional: Negative.  Negative for activity change, appetite change, chills, diaphoresis, fatigue and unexpected weight change.   HENT: Negative for congestion, dental problem, drooling, ear discharge, ear pain, facial swelling, hearing loss, mouth sores, nosebleeds, postnasal drip, rhinorrhea, sinus pressure, sneezing, sore throat, tinnitus, trouble swallowing and voice change.    Eyes: Negative.  Negative for photophobia, pain, discharge, redness, itching and visual disturbance.   Respiratory: Negative.  Negative for apnea, cough, choking, chest tightness, shortness of breath, wheezing and stridor.    Cardiovascular: Negative.  Negative for chest pain, palpitations and leg swelling.   Gastrointestinal: Negative.  Negative for abdominal distention, abdominal pain, anal bleeding, blood in stool, constipation, diarrhea, nausea, rectal pain and vomiting.   Endocrine: Negative.  Negative for cold intolerance, heat intolerance, polydipsia, polyphagia and polyuria.   Genitourinary: Positive for difficulty urinating.   Musculoskeletal: Negative.  Negative for arthralgias, back pain, gait problem, joint swelling, myalgias,  neck pain and neck stiffness.   Skin: Negative.  Negative for color change, pallor, rash and wound.   Allergic/Immunologic: Negative.  Negative for environmental allergies, food allergies and immunocompromised state.   Neurological: Negative.  Negative for dizziness, tremors, seizures, syncope, facial asymmetry, speech difficulty, weakness, light-headedness, numbness and headaches.   Hematological: Negative.  Negative for adenopathy. Does not bruise/bleed easily.   Psychiatric/Behavioral: Negative for agitation, behavioral problems, confusion, decreased concentration, dysphoric mood, hallucinations, self-injury, sleep disturbance and suicidal ideas. The patient is not nervous/anxious and is not hyperactive.    All other systems reviewed and are negative.      Physical Exam:     Physical Exam   Constitutional: She appears well-developed and well-nourished.   HENT:   Head: Normocephalic and atraumatic.   Right Ear: External ear normal.   Left Ear: External ear normal.   Mouth/Throat: Oropharynx is clear and moist.   Eyes: Pupils are equal, round, and reactive to light. Conjunctivae are normal.   Cardiovascular: Normal rate, regular rhythm, normal heart sounds and intact distal pulses.   Pulmonary/Chest: Effort normal and breath sounds normal.   Abdominal: Soft. Bowel sounds are normal. She exhibits no distension and no mass. There is no tenderness. There is no rebound and no guarding.   Genitourinary: Vagina normal. No vaginal discharge found.   Musculoskeletal: Normal range of motion.   Neurological: She is alert. She has normal reflexes.   Skin: Skin is warm and dry.   Psychiatric: She has a normal mood and affect. Her behavior is normal. Judgment and thought content normal.       I have reviewed the following portions of the patient's history: allergies, current medications, past family history, past medical history, past social history, past surgical history, problem list and ROS and confirm it's  accurate.      Procedure:    Following, and informed consent including the risk of anesthesia, bleeding, infection, exacerbation of pain as well as the need for an additional procedure.  She brought to the operative suite after unremarkable anesthesia she was placed in the low dorsolithotomy position.  I gently dilated the urethra from 16-26 [place] without complication     Assessment/Plan:   Urethral stricture-posttraumatic dilated was very tight as expected  Narcotic pain medication-patient has significant acute pain that I believe would be an indication for the use of narcotic pain medication.  I discussed the significant risks of pain medication and the fact that this will be a short only option and I will give her no more than a three-day supply of pain medication.  And I will not plan long-term medication and that this will be sent to a pain clinic if at all becomes necessary.  We discussed signing a pain medication agreement and the fact that we're going to run a state [facility] review to be sure the patient is not getting pain medication from elsewhere.  If this is the case we will not give pain medication.  As part of the patient's treatment plan of there being prescribed a controlled substance with potential for abuse.  This patient has been wait aware of the appropriate dose of such medications including, the risk for somnolence, limited ability to drive and/or safety and the significant potential for overdose.  It is been made clear that these medications are for the prescribed patient only without concomitant use of alcohol or other sepsis unless prescribed by the medical provider.  Has completed prescribing agreement detailing the terms of continue prescribing him a controlled substance.  Including monitoring Luis Alberto ports, the possibility of urine drug screens and pedal counts.  The patient is aware that we reviewed LUIS ALBERTO reports on a regular basis and scan them into the chart.  History and physical  examination exhibited continued safe and appropriate use of controlled substances.  Also discussed the fact that the new [place] legislation allows only a three-day prescription for pain medication.  In this situation he will be referred to a chronic pain clinic.            Patient's There is no height or weight on file to calculate BMI. BMI is within normal parameters. No follow-up required..              This document has been electronically signed by VERENICE FINK MD August 14, 2020 14:29

## 2020-09-15 ENCOUNTER — OFFICE VISIT (OUTPATIENT)
Dept: UROLOGY | Facility: CLINIC | Age: 45
End: 2020-09-15

## 2020-09-15 VITALS — TEMPERATURE: 98.6 F | BODY MASS INDEX: 18.19 KG/M2 | WEIGHT: 120 LBS | HEIGHT: 68 IN

## 2020-09-15 DIAGNOSIS — R31.0 GROSS HEMATURIA: ICD-10-CM

## 2020-09-15 DIAGNOSIS — N35.028 OTHER POST-TRAUMATIC URETHRAL STRICTURE, FEMALE: ICD-10-CM

## 2020-09-15 DIAGNOSIS — Z48.816 AFTERCARE FOLLOWING SURGERY OF THE GENITOURINARY SYSTEM: Primary | ICD-10-CM

## 2020-09-15 PROCEDURE — 99213 OFFICE O/P EST LOW 20 MIN: CPT | Performed by: UROLOGY

## 2020-09-15 PROCEDURE — 53661 DILATION OF URETHRA: CPT | Performed by: UROLOGY

## 2020-09-15 PROCEDURE — 96372 THER/PROPH/DIAG INJ SC/IM: CPT | Performed by: UROLOGY

## 2020-09-15 RX ORDER — GENTAMICIN SULFATE 40 MG/ML
80 INJECTION, SOLUTION INTRAMUSCULAR; INTRAVENOUS ONCE
Status: COMPLETED | OUTPATIENT
Start: 2020-09-15 | End: 2020-09-15

## 2020-09-15 RX ORDER — OXYCODONE AND ACETAMINOPHEN 10; 325 MG/1; MG/1
1 TABLET ORAL EVERY 6 HOURS PRN
Qty: 8 TABLET | Refills: 0 | Status: SHIPPED | OUTPATIENT
Start: 2020-09-15 | End: 2020-10-13 | Stop reason: SDUPTHER

## 2020-09-15 RX ADMIN — GENTAMICIN SULFATE 80 MG: 40 INJECTION, SOLUTION INTRAMUSCULAR; INTRAVENOUS at 13:26

## 2020-09-15 NOTE — PROGRESS NOTES
Chief Complaint:          Chief Complaint   Patient presents with   • Dilation     Follow up        HPI:   45 y.o. female severe urethral stenosis for emergent dilation.      Past Medical History:        Past Medical History:   Diagnosis Date   • Abdominal pain    • Abdominal swelling    • Hoyos's disease    • Bipolar 1 disorder (CMS/HCC)    • Brain tumor (benign) (CMS/HCC)    • Brain tumor (CMS/HCC)     R Frontal Lobe per pt   • Brain tumor (CMS/HCC) 2014   • Constipation    • DDD (degenerative disc disease), cervical 05/29/2017   • DDD (degenerative disc disease), cervical    • Diarrhea    • Fibromyalgia    • IBS (irritable bowel syndrome)    • Migraine    • Nausea & vomiting    • PONV (postoperative nausea and vomiting)    • PTSD (post-traumatic stress disorder)    • Rectal bleeding          Current Meds:     Current Outpatient Medications   Medication Sig Dispense Refill   • acetaminophen-codeine (TYLENOL #3) 300-30 MG per tablet Take 1 tablet by mouth Every 6 (Six) Hours As Needed for Moderate Pain . 12 tablet 0   • albuterol (PROVENTIL HFA;VENTOLIN HFA) 108 (90 Base) MCG/ACT inhaler Inhale 2 puffs 2 (Two) Times a Day.     • Azelastine HCl 137 MCG/SPRAY solution 1 spray into each nostril As Needed (Allergies).     • busPIRone (BUSPAR) 15 MG tablet Take 15 mg by mouth 3 (three) times a day        • cefuroxime (CEFTIN) 500 MG tablet      • cetirizine (zyrTEC) 10 MG tablet Take 1 tablet by mouth Daily. 30 tablet 0   • cyclobenzaprine (FLEXERIL) 5 MG tablet      • diclofenac (VOLTAREN) 1 % gel gel      • divalproex (DEPAKOTE) 500 MG DR tablet Take 1 tablet by mouth 3 (Three) Times a Day. 90 tablet 2   • fluticasone (VERAMYST) 27.5 MCG/SPRAY nasal spray 2 sprays into each nostril Daily.     • montelukast (SINGULAIR) 10 MG tablet Take 10 mg by mouth Every Night.     • ondansetron (ZOFRAN) 4 MG tablet      • orphenadrine (NORFLEX) 100 MG 12 hr tablet Take 1 tablet by mouth 2 (Two) Times a Day. 20 tablet 0   •  oxyCODONE (ROXICODONE) 5 MG immediate release tablet      • oxyCODONE-acetaminophen (Percocet)  MG per tablet Take 1 tablet by mouth Every 6 (Six) Hours As Needed for Moderate Pain . 8 tablet 0   • pantoprazole (PROTONIX) 40 MG EC tablet Take 40 mg by mouth 2 (Two) Times a Day As Needed.     • phenazopyridine (PYRIDIUM) 100 MG tablet      • polyethylene glycol (MIRALAX) powder Take 17 g by mouth Daily. 289 g 0   • promethazine (PHENERGAN) 25 MG tablet      • sertraline (ZOLOFT) 100 MG tablet Take 100 mg by mouth 2 (Two) Times a Day.     • SUMAtriptan (IMITREX) 6 MG/0.5ML solution injection Inject 6 mg under the skin 1 (One) Time.     • tiZANidine (ZANAFLEX) 4 MG tablet      • traMADol (ULTRAM) 50 MG tablet        No current facility-administered medications for this visit.         Allergies:      Allergies   Allergen Reactions   • Ativan [Lorazepam] Hallucinations     confusion   • Sulfa Antibiotics Shortness Of Breath and Swelling   • Sulfa Antibiotics Anaphylaxis   • Reglan [Metoclopramide] Angioedema   • Compazine [Prochlorperazine Edisylate] Hives   • Demerol [Meperidine] Hives   • Droperidol Itching   • Metoclopramide Swelling   • Toradol [Ketorolac Tromethamine] Hives and Itching   • Toradol [Ketorolac Tromethamine] Hives   • Zofran [Ondansetron Hcl] Rash   • Zosyn [Piperacillin Sod-Tazobactam So] Hives        Past Surgical History:     Past Surgical History:   Procedure Laterality Date   • ANAL SCOPE N/A 7/28/2016    Procedure: ANAL SCOPE;  Surgeon: Kael Lopez MD;  Location: [facility] OR;  Service:    • APPENDECTOMY     • COLONOSCOPY N/A 6/30/2016    Procedure: COLONOSCOPY  CPTCODE:82614;  Surgeon: Jose Antonio Belle III, MD;  Location: [facility] OR;  Service:    • COLONOSCOPY N/A 7/7/2016    Procedure: COLONOSCOPY (00029) CPT;  Surgeon: Jose Antonio Belle III, MD;  Location: [facility] OR;  Service:    • CYSTOSCOPY RETROGRADE PYELOGRAM Bilateral 4/28/2017    Procedure: CYSTOSCOPY RETROGRADE PYELOGRAM;   Surgeon: Mu Blunt MD;  Location: [facility];  Service:    • ENDOSCOPY N/A 2016    Procedure: ESOPHAGOGASTRODUODENOSCOPY WITH BIOPSY  CPTCODE:89515;  Surgeon: Jose Antonio Belle III, MD;  Location: [facility] OR;  Service:    • HEMORRHOIDECTOMY N/A 2016    Procedure: HEMORRHOID STAPLING;  Surgeon: Kael Lopez MD;  Location: [facility] OR;  Service:    • HYSTERECTOMY     • KNEE SURGERY     • LAPAROSCOPIC SALPINGOOPHERECTOMY     • PORTACATH PLACEMENT N/A 2017    Procedure: INSERTION OF PORTACATH;  Surgeon: Celso Arredondo MD;  Location: [facility];  Service:    • SHOULDER SURGERY      3 times         Social History:     Social History     Socioeconomic History   • Marital status:      Spouse name: Not on file   • Number of children: Not on file   • Years of education: Not on file   • Highest education level: Not on file   Tobacco Use   • Smoking status: Former Smoker     Packs/day: 1.00     Years: 20.00     Pack years: 20.00     Quit date: 2015     Years since quittin.8   • Smokeless tobacco: Never Used   Substance and Sexual Activity   • Alcohol use: No   • Drug use: Yes     Types: Marijuana     Comment: for pain   • Sexual activity: Defer     Birth control/protection: Surgical   Social History Narrative    ** Merged History Encounter **         Lives in [place], works as a , independent of ADL       Family History:     Family History   Problem Relation Age of Onset   • Crohn's disease Other    • Hypertension Other    • Diabetes Other    • Irritable bowel syndrome Other    • No Known Problems Father    • No Known Problems Mother        Review of Systems:     Review of Systems   Constitutional: Negative.  Negative for activity change, appetite change, chills, diaphoresis, fatigue and unexpected weight change.   HENT: Negative for congestion, dental problem, drooling, ear discharge, ear pain, facial swelling, hearing loss, mouth sores, nosebleeds, postnasal drip,  rhinorrhea, sinus pressure, sneezing, sore throat, tinnitus, trouble swallowing and voice change.    Eyes: Negative.  Negative for photophobia, pain, discharge, redness, itching and visual disturbance.   Respiratory: Negative.  Negative for apnea, cough, choking, chest tightness, shortness of breath, wheezing and stridor.    Cardiovascular: Negative.  Negative for chest pain, palpitations and leg swelling.   Gastrointestinal: Negative.  Negative for abdominal distention, abdominal pain, anal bleeding, blood in stool, constipation, diarrhea, nausea, rectal pain and vomiting.   Endocrine: Negative.  Negative for cold intolerance, heat intolerance, polydipsia, polyphagia and polyuria.   Genitourinary: Positive for difficulty urinating.   Musculoskeletal: Negative.  Negative for arthralgias, back pain, gait problem, joint swelling, myalgias, neck pain and neck stiffness.   Skin: Negative.  Negative for color change, pallor, rash and wound.   Allergic/Immunologic: Negative.  Negative for environmental allergies, food allergies and immunocompromised state.   Neurological: Negative.  Negative for dizziness, tremors, seizures, syncope, facial asymmetry, speech difficulty, weakness, light-headedness, numbness and headaches.   Hematological: Negative.  Negative for adenopathy. Does not bruise/bleed easily.   Psychiatric/Behavioral: Negative for agitation, behavioral problems, confusion, decreased concentration, dysphoric mood, hallucinations, self-injury, sleep disturbance and suicidal ideas. The patient is not nervous/anxious and is not hyperactive.    All other systems reviewed and are negative.      Physical Exam:     Physical Exam  Constitutional:       Appearance: She is well-developed.   HENT:      Head: Normocephalic and atraumatic.      Right Ear: External ear normal.      Left Ear: External ear normal.   Eyes:      Conjunctiva/sclera: Conjunctivae normal.      Pupils: Pupils are equal, round, and reactive to light.      Cardiovascular:      Rate and Rhythm: Normal rate and regular rhythm.      Heart sounds: Normal heart sounds.   Pulmonary:      Effort: Pulmonary effort is normal.      Breath sounds: Normal breath sounds.   Abdominal:      General: Bowel sounds are normal. There is no distension.      Palpations: Abdomen is soft. There is no mass.      Tenderness: There is no abdominal tenderness. There is no guarding or rebound.   Genitourinary:     General: Normal vulva.      Vagina: No vaginal discharge.      Rectum: Normal.   Musculoskeletal: Normal range of motion.   Skin:     General: Skin is warm and dry.   Neurological:      Mental Status: She is alert.      Deep Tendon Reflexes: Reflexes are normal and symmetric.   Psychiatric:         Behavior: Behavior normal.         Thought Content: Thought content normal.         Judgment: Judgment normal.         I have reviewed the following portions of the patient's history: allergies, current medications, past family history, past medical history, past social history, past surgical history, problem list and ROS and confirm it's accurate.      Procedure:   Urethral dilation-after an appropriate informed consent the patient was brought to the procedure suite.  The urethra was gently anesthetized with 10 cc of 2% viscous Xylocaine jelly.  After an adequate period of topical anesthesia I went ahead anddilated with [place] sounds from 16-28 Arabic sequentially without complication the patient was given gentamicin as prophylaxis with 80 mg    Assessment/Plan:   Posttraumatic urethral stricture-severe status post an urgent dilation without complication.  Narcotic pain medication-patient has significant acute pain that I believe would be an indication for the use of narcotic pain medication.  I discussed the significant risks of pain medication and the fact that this will be a short only option and I will give her no more than a three-day supply of pain medication.  And I will not  plan long-term medication and that this will be sent to a pain clinic if at all becomes necessary.  We discussed signing a pain medication agreement and the fact that we're going to run a state LUIS ALBERTO review to be sure the patient is not getting pain medication from elsewhere.  If this is the case we will not give pain medication.  As part of the patient's treatment plan of there being prescribed a controlled substance with potential for abuse.  This patient has been wait aware of the appropriate dose of such medications including, the risk for somnolence, limited ability to drive and/or safety and the significant potential for overdose.  It is been made clear that these medications are for the prescribed patient only without concomitant use of alcohol or other sepsis unless prescribed by the medical provider.  Has completed prescribing agreement detailing the terms of continue prescribing him a controlled substance.  Including monitoring Luis Alberto ports, the possibility of urine drug screens and pedal counts.  The patient is aware that we reviewed LUIS ALBERTO reports on a regular basis and scan them into the chart.  History and physical examination exhibited continued safe and appropriate use of controlled substances.  Also discussed the fact that the new [place] legislation allows only a three-day prescription for pain medication.  In this situation he will be referred to a chronic pain clinic.            Patient's Body mass index is 18.25 kg/m². BMI is within normal parameters. No follow-up required..              This document has been electronically signed by VERENICE FINK MD September 15, 2020 13:11 EDT

## 2020-10-13 ENCOUNTER — OFFICE VISIT (OUTPATIENT)
Dept: UROLOGY | Facility: CLINIC | Age: 45
End: 2020-10-13

## 2020-10-13 VITALS — WEIGHT: 99.8 LBS | HEIGHT: 68 IN | TEMPERATURE: 99.3 F | BODY MASS INDEX: 15.13 KG/M2

## 2020-10-13 DIAGNOSIS — R35.0 FREQUENCY OF MICTURITION: Primary | ICD-10-CM

## 2020-10-13 DIAGNOSIS — R31.0 GROSS HEMATURIA: ICD-10-CM

## 2020-10-13 DIAGNOSIS — N35.028 OTHER POST-TRAUMATIC URETHRAL STRICTURE, FEMALE: ICD-10-CM

## 2020-10-13 DIAGNOSIS — Z48.816 AFTERCARE FOLLOWING SURGERY OF THE GENITOURINARY SYSTEM: ICD-10-CM

## 2020-10-13 PROCEDURE — 99213 OFFICE O/P EST LOW 20 MIN: CPT | Performed by: UROLOGY

## 2020-10-13 PROCEDURE — 53661 DILATION OF URETHRA: CPT | Performed by: UROLOGY

## 2020-10-13 PROCEDURE — 96372 THER/PROPH/DIAG INJ SC/IM: CPT | Performed by: UROLOGY

## 2020-10-13 RX ORDER — OXYCODONE AND ACETAMINOPHEN 10; 325 MG/1; MG/1
1 TABLET ORAL EVERY 6 HOURS PRN
Qty: 8 TABLET | Refills: 0 | Status: SHIPPED | OUTPATIENT
Start: 2020-10-13 | End: 2020-11-12 | Stop reason: SDUPTHER

## 2020-10-13 RX ORDER — GENTAMICIN SULFATE 40 MG/ML
80 INJECTION, SOLUTION INTRAMUSCULAR; INTRAVENOUS ONCE
Status: COMPLETED | OUTPATIENT
Start: 2020-10-13 | End: 2020-10-13

## 2020-10-13 RX ADMIN — GENTAMICIN SULFATE 80 MG: 40 INJECTION, SOLUTION INTRAMUSCULAR; INTRAVENOUS at 13:31

## 2020-10-22 ENCOUNTER — APPOINTMENT (OUTPATIENT)
Dept: GENERAL RADIOLOGY | Facility: HOSPITAL | Age: 45
End: 2020-10-22

## 2020-10-22 ENCOUNTER — HOSPITAL ENCOUNTER (EMERGENCY)
Facility: HOSPITAL | Age: 45
Discharge: HOME | End: 2020-10-22
Admitting: STUDENT IN AN ORGANIZED HEALTH CARE EDUCATION/TRAINING PROGRAM

## 2020-10-22 VITALS
DIASTOLIC BLOOD PRESSURE: 77 MMHG | SYSTOLIC BLOOD PRESSURE: 134 MMHG | HEART RATE: 92 BPM | WEIGHT: 110 LBS | RESPIRATION RATE: 18 BRPM | HEIGHT: 69 IN | TEMPERATURE: 98 F | OXYGEN SATURATION: 99 % | BODY MASS INDEX: 16.29 KG/M2

## 2020-10-22 DIAGNOSIS — S66.911A HAND STRAIN, RIGHT, INITIAL ENCOUNTER: ICD-10-CM

## 2020-10-22 DIAGNOSIS — S46.912A STRAIN OF LEFT SHOULDER, INITIAL ENCOUNTER: Primary | ICD-10-CM

## 2020-10-22 PROCEDURE — 73030 X-RAY EXAM OF SHOULDER: CPT | Performed by: RADIOLOGY

## 2020-10-22 PROCEDURE — 73130 X-RAY EXAM OF HAND: CPT

## 2020-10-22 PROCEDURE — 73110 X-RAY EXAM OF WRIST: CPT | Performed by: RADIOLOGY

## 2020-10-22 PROCEDURE — 73130 X-RAY EXAM OF HAND: CPT | Performed by: RADIOLOGY

## 2020-10-22 PROCEDURE — 73110 X-RAY EXAM OF WRIST: CPT

## 2020-10-22 PROCEDURE — 73030 X-RAY EXAM OF SHOULDER: CPT

## 2020-10-22 PROCEDURE — 99283 EMERGENCY DEPT VISIT LOW MDM: CPT

## 2020-10-22 RX ORDER — HYDROCODONE BITARTRATE AND ACETAMINOPHEN 7.5; 325 MG/1; MG/1
1 TABLET ORAL ONCE
Status: COMPLETED | OUTPATIENT
Start: 2020-10-22 | End: 2020-10-22

## 2020-10-22 RX ADMIN — HYDROCODONE BITARTRATE AND ACETAMINOPHEN 1 TABLET: 7.5; 325 TABLET ORAL at 15:36

## 2020-10-22 NOTE — ED PROVIDER NOTES
Subjective   45-year-old female presents to the emergency room with right hand pain after she punched a wall approximately 10 days ago.  She also complains of left shoulder pain after she ran into a wall 10 days ago as well.  She is also complaining of a headache.  She denies any previous hand or shoulder pain in the past.  She denies fever, nausea, vomiting, visual changes.  Aggravating factors of her hand and shoulder include movement.  Aggravating factors of her headache include light and sound.  Denies any alleviating factors.      History provided by:  Patient   used: No        Review of Systems   Constitutional: Negative.  Negative for fever.   Respiratory: Negative.    Cardiovascular: Negative.  Negative for chest pain.   Gastrointestinal: Negative.  Negative for abdominal pain.   Endocrine: Negative.    Genitourinary: Negative.  Negative for dysuria.   Musculoskeletal:        (+) Right hand pain and left shoulder pain   Skin: Negative.    Neurological: Positive for headaches.   Psychiatric/Behavioral: Negative.    All other systems reviewed and are negative.      Past Medical History:   Diagnosis Date   • Abdominal pain    • Abdominal swelling    • Hoyos's disease    • Bipolar 1 disorder (CMS/HCC)    • Brain tumor (benign) (CMS/HCC)    • Brain tumor (CMS/HCC)     R Frontal Lobe per pt   • Brain tumor (CMS/HCC) 2014   • Constipation    • DDD (degenerative disc disease), cervical 05/29/2017   • DDD (degenerative disc disease), cervical    • Diarrhea    • Fibromyalgia    • IBS (irritable bowel syndrome)    • Migraine    • Nausea & vomiting    • PONV (postoperative nausea and vomiting)    • PTSD (post-traumatic stress disorder)    • Rectal bleeding        Allergies   Allergen Reactions   • Ativan [Lorazepam] Hallucinations     confusion   • Sulfa Antibiotics Shortness Of Breath and Swelling   • Sulfa Antibiotics Anaphylaxis   • Reglan [Metoclopramide] Angioedema   • Compazine  [Prochlorperazine Edisylate] Hives   • Demerol [Meperidine] Hives   • Droperidol Itching   • Metoclopramide Swelling   • Toradol [Ketorolac Tromethamine] Hives and Itching   • Toradol [Ketorolac Tromethamine] Hives   • Zofran [Ondansetron Hcl] Rash   • Zosyn [Piperacillin Sod-Tazobactam So] Hives       Past Surgical History:   Procedure Laterality Date   • ANAL SCOPE N/A 7/28/2016    Procedure: ANAL SCOPE;  Surgeon: aKel Lopez MD;  Location: [facility] OR;  Service:    • APPENDECTOMY     • COLONOSCOPY N/A 6/30/2016    Procedure: COLONOSCOPY  CPTCODE:59858;  Surgeon: Jose Antonio Belle III, MD;  Location: [facility] OR;  Service:    • COLONOSCOPY N/A 7/7/2016    Procedure: COLONOSCOPY (15174) CPT;  Surgeon: Jose Antonio Belle III, MD;  Location: [facility] OR;  Service:    • CYSTOSCOPY RETROGRADE PYELOGRAM Bilateral 4/28/2017    Procedure: CYSTOSCOPY RETROGRADE PYELOGRAM;  Surgeon: Mu Blunt MD;  Location: [facility] OR;  Service:    • ENDOSCOPY N/A 6/30/2016    Procedure: ESOPHAGOGASTRODUODENOSCOPY WITH BIOPSY  CPTCODE:04925;  Surgeon: Jose Antonio Belle III, MD;  Location: [facility] OR;  Service:    • HEMORRHOIDECTOMY N/A 7/28/2016    Procedure: HEMORRHOID STAPLING;  Surgeon: Kael Lopez MD;  Location: [facility] OR;  Service:    • HYSTERECTOMY     • KNEE SURGERY     • LAPAROSCOPIC SALPINGOOPHERECTOMY     • PORTACATH PLACEMENT N/A 7/28/2017    Procedure: INSERTION OF PORTACATH;  Surgeon: Celso Arredondo MD;  Location: [facility] OR;  Service:    • SHOULDER SURGERY      3 times       Family History   Problem Relation Age of Onset   • Crohn's disease Other    • Hypertension Other    • Diabetes Other    • Irritable bowel syndrome Other    • No Known Problems Father    • No Known Problems Mother        Social History     Socioeconomic History   • Marital status:      Spouse name: Not on file   • Number of children: Not on file   • Years of education: Not on file   • Highest education level: Not on file    Tobacco Use   • Smoking status: Former Smoker     Packs/day: 1.00     Years: 20.00     Pack years: 20.00     Quit date: 2015     Years since quittin.9   • Smokeless tobacco: Never Used   Substance and Sexual Activity   • Alcohol use: No   • Drug use: Yes     Types: Marijuana     Comment: for pain   • Sexual activity: Defer     Birth control/protection: Surgical   Social History Narrative    ** Merged History Encounter **         Lives in [place], works as a , independent of ADL           Objective   Physical Exam  Vitals signs and nursing note reviewed.   Constitutional:       General: She is not in acute distress.     Appearance: She is well-developed. She is not diaphoretic.   HENT:      Head: Normocephalic and atraumatic.      Right Ear: External ear normal.      Left Ear: External ear normal.      Nose: Nose normal.   Eyes:      Conjunctiva/sclera: Conjunctivae normal.      Pupils: Pupils are equal, round, and reactive to light.   Neck:      Musculoskeletal: Normal range of motion and neck supple.      Vascular: No JVD.      Trachea: No tracheal deviation.   Cardiovascular:      Rate and Rhythm: Normal rate and regular rhythm.      Heart sounds: Normal heart sounds. No murmur.   Pulmonary:      Effort: Pulmonary effort is normal. No respiratory distress.      Breath sounds: Normal breath sounds. No wheezing.   Abdominal:      General: Bowel sounds are normal.      Palpations: Abdomen is soft.      Tenderness: There is no abdominal tenderness.   Musculoskeletal:         General: No deformity.      Left shoulder: She exhibits pain.      Right wrist: She exhibits decreased range of motion, tenderness, bony tenderness and swelling.      Right hand: She exhibits decreased range of motion and tenderness.   Skin:     General: Skin is warm and dry.      Coloration: Skin is not pale.      Findings: No erythema or rash.   Neurological:      Mental Status: She is alert and oriented to person, place, and  time.      Cranial Nerves: No cranial nerve deficit.   Psychiatric:         Behavior: Behavior normal.         Thought Content: Thought content normal.         Splint - Cast - Strapping    Date/Time: 10/22/2020 3:54 PM  Performed by: Han Varghese PA-C  Authorized by: Han Varghese PA-C     Consent:     Consent obtained:  Verbal    Consent given by:  Patient    Risks discussed:  Discoloration, numbness, pain and swelling    Alternatives discussed:  No treatment, delayed treatment, alternative treatment, observation and referral  Universal protocol:     Procedure explained and questions answered to patient or proxy's satisfaction: yes      Relevant documents present and verified: yes      Test results available and properly labeled: yes      Imaging studies available: yes      Required blood products, implants, devices, and special equipment available: yes      Site/side marked: yes      Immediately prior to procedure a time out was called: yes      Patient identity confirmed:  Verbally with patient, arm band, provided demographic data and hospital-assigned identification number  Pre-procedure details:     Sensation:  Normal    Skin color:  Warm, pink  Procedure details:     Laterality:  Left    Location:  Shoulder    Shoulder:  L shoulder    Supplies:  Sling and prefabricated splint  Post-procedure details:     Pain:  Unchanged    Sensation:  Normal    Skin color:  Warm, pink    Patient tolerance of procedure:  Tolerated well, no immediate complications  Comments:      Patient tolerated splint application well, per tech. No acute complications. Neurovascularly intact.      Splint - Cast - Strapping    Date/Time: 10/22/2020 3:54 PM  Performed by: Han Varghese PA-C  Authorized by: Han Varghese PA-C     Consent:     Consent obtained:  Verbal    Consent given by:  Patient    Risks discussed:  Discoloration, numbness, pain and swelling    Alternatives discussed:  No treatment, delayed  treatment, alternative treatment, observation and referral  Universal protocol:     Procedure explained and questions answered to patient or proxy's satisfaction: yes      Relevant documents present and verified: yes      Test results available and properly labeled: yes      Imaging studies available: yes      Required blood products, implants, devices, and special equipment available: yes      Site/side marked: yes      Immediately prior to procedure a time out was called: yes      Patient identity confirmed:  Verbally with patient, arm band, provided demographic data and hospital-assigned identification number  Pre-procedure details:     Sensation:  Normal    Skin color:  Warm, pink  Procedure details:     Laterality:  Right    Location:  Hand    Hand:  R hand    Splint type:  Wrist    Supplies:  Cotton padding, elastic bandage and Ortho-Glass  Post-procedure details:     Pain:  Unchanged    Sensation:  Normal    Skin color:  Warm, pink    Patient tolerance of procedure:  Tolerated well, no immediate complications  Comments:      Patient tolerated splint application well, per tech. No acute complications. Neurovascularly intact.                 ED Course  ED Course as of Oct 22 2313   Thu Oct 22, 2020   1552 XR Shoulder 2+ View Left [TK]   1552 XR Hand 3+ View Right [TK]   1552 XR Wrist 3+ View Right [TK]      ED Course User Index  [TK] Han Varghese PA-C                                           MDM  Number of Diagnoses or Management Options  Hand strain, right, initial encounter: new and requires workup  Strain of left shoulder, initial encounter: new and requires workup     Amount and/or Complexity of Data Reviewed  Tests in the radiology section of CPT®: reviewed and ordered    Risk of Complications, Morbidity, and/or Mortality  Presenting problems: moderate  Diagnostic procedures: moderate  Management options: moderate    Patient Progress  Patient progress: stable      Final diagnoses:   Strain of left  shoulder, initial encounter   Hand strain, right, initial encounter            Han Varghese, GERSON  10/22/20 [id number]

## 2020-11-12 ENCOUNTER — OFFICE VISIT (OUTPATIENT)
Dept: UROLOGY | Facility: CLINIC | Age: 45
End: 2020-11-12

## 2020-11-12 VITALS — WEIGHT: 110 LBS | HEIGHT: 69 IN | BODY MASS INDEX: 16.29 KG/M2

## 2020-11-12 DIAGNOSIS — N35.028 OTHER POST-TRAUMATIC URETHRAL STRICTURE, FEMALE: ICD-10-CM

## 2020-11-12 DIAGNOSIS — R31.0 GROSS HEMATURIA: ICD-10-CM

## 2020-11-12 DIAGNOSIS — Z79.2 PROPHYLACTIC ANTIBIOTIC: Primary | ICD-10-CM

## 2020-11-12 PROCEDURE — 53661 DILATION OF URETHRA: CPT | Performed by: UROLOGY

## 2020-11-12 PROCEDURE — 96372 THER/PROPH/DIAG INJ SC/IM: CPT | Performed by: UROLOGY

## 2020-11-12 RX ORDER — GENTAMICIN SULFATE 40 MG/ML
80 INJECTION, SOLUTION INTRAMUSCULAR; INTRAVENOUS ONCE
Status: COMPLETED | OUTPATIENT
Start: 2020-11-12 | End: 2020-11-12

## 2020-11-12 RX ORDER — OXYCODONE AND ACETAMINOPHEN 10; 325 MG/1; MG/1
1 TABLET ORAL EVERY 6 HOURS PRN
Qty: 8 TABLET | Refills: 0 | Status: SHIPPED | OUTPATIENT
Start: 2020-11-12 | End: 2020-12-03 | Stop reason: SDUPTHER

## 2020-11-12 RX ADMIN — GENTAMICIN SULFATE 80 MG: 40 INJECTION, SOLUTION INTRAMUSCULAR; INTRAVENOUS at 08:59

## 2020-11-12 NOTE — PROGRESS NOTES
Chief Complaint:          Chief Complaint   Patient presents with   • dilation     follow up        HPI:   45 y.o. female returns today for dilation of the urethra.  She has significant anatomic stenosis in the mid urethra causing severe pain recalcitrant to self dilation after prep and drape I dilated to 28 French which was very good for her she tolerated it well.      Past Medical History:        Past Medical History:   Diagnosis Date   • Abdominal pain    • Abdominal swelling    • Hoyos's disease    • Bipolar 1 disorder (CMS/HCC)    • Brain tumor (benign) (CMS/HCC)    • Brain tumor (CMS/HCC)     R Frontal Lobe per pt   • Brain tumor (CMS/HCC) 2014   • Constipation    • DDD (degenerative disc disease), cervical 05/29/2017   • DDD (degenerative disc disease), cervical    • Diarrhea    • Fibromyalgia    • IBS (irritable bowel syndrome)    • Migraine    • Nausea & vomiting    • PONV (postoperative nausea and vomiting)    • PTSD (post-traumatic stress disorder)    • Rectal bleeding          Current Meds:     Current Outpatient Medications   Medication Sig Dispense Refill   • acetaminophen-codeine (TYLENOL #3) 300-30 MG per tablet Take 1 tablet by mouth Every 6 (Six) Hours As Needed for Moderate Pain . 12 tablet 0   • albuterol (PROVENTIL HFA;VENTOLIN HFA) 108 (90 Base) MCG/ACT inhaler Inhale 2 puffs 2 (Two) Times a Day.     • Azelastine HCl 137 MCG/SPRAY solution 1 spray into each nostril As Needed (Allergies).     • busPIRone (BUSPAR) 15 MG tablet Take 15 mg by mouth 3 (three) times a day        • cefuroxime (CEFTIN) 500 MG tablet      • cetirizine (zyrTEC) 10 MG tablet Take 1 tablet by mouth Daily. 30 tablet 0   • cyclobenzaprine (FLEXERIL) 5 MG tablet      • diclofenac (VOLTAREN) 1 % gel gel      • divalproex (DEPAKOTE) 500 MG DR tablet Take 1 tablet by mouth 3 (Three) Times a Day. 90 tablet 2   • fluticasone (VERAMYST) 27.5 MCG/SPRAY nasal spray 2 sprays into each nostril Daily.     • montelukast (SINGULAIR) 10  MG tablet Take 10 mg by mouth Every Night.     • ondansetron (ZOFRAN) 4 MG tablet      • orphenadrine (NORFLEX) 100 MG 12 hr tablet Take 1 tablet by mouth 2 (Two) Times a Day. 20 tablet 0   • oxyCODONE (ROXICODONE) 5 MG immediate release tablet      • oxyCODONE-acetaminophen (Percocet)  MG per tablet Take 1 tablet by mouth Every 6 (Six) Hours As Needed for Moderate Pain . 8 tablet 0   • pantoprazole (PROTONIX) 40 MG EC tablet Take 40 mg by mouth 2 (Two) Times a Day As Needed.     • phenazopyridine (PYRIDIUM) 100 MG tablet      • polyethylene glycol (MIRALAX) powder Take 17 g by mouth Daily. 289 g 0   • promethazine (PHENERGAN) 25 MG tablet      • sertraline (ZOLOFT) 100 MG tablet Take 100 mg by mouth 2 (Two) Times a Day.     • SUMAtriptan (IMITREX) 6 MG/0.5ML solution injection Inject 6 mg under the skin 1 (One) Time.     • tiZANidine (ZANAFLEX) 4 MG tablet      • traMADol (ULTRAM) 50 MG tablet        No current facility-administered medications for this visit.         Allergies:      Allergies   Allergen Reactions   • Ativan [Lorazepam] Hallucinations     confusion   • Sulfa Antibiotics Shortness Of Breath and Swelling   • Sulfa Antibiotics Anaphylaxis   • Reglan [Metoclopramide] Angioedema   • Compazine [Prochlorperazine Edisylate] Hives   • Demerol [Meperidine] Hives   • Droperidol Itching   • Metoclopramide Swelling   • Toradol [Ketorolac Tromethamine] Hives and Itching   • Toradol [Ketorolac Tromethamine] Hives   • Zofran [Ondansetron Hcl] Rash   • Zosyn [Piperacillin Sod-Tazobactam So] Hives        Past Surgical History:     Past Surgical History:   Procedure Laterality Date   • ANAL SCOPE N/A 7/28/2016    Procedure: ANAL SCOPE;  Surgeon: Kael Lopez MD;  [facility];  Service:    • APPENDECTOMY     • COLONOSCOPY N/A 6/30/2016    Procedure: COLONOSCOPY  CPTCODE:19525;  Surgeon: Jose Antonio Belle III, MD;  [facility];  Service:    • COLONOSCOPY N/A 7/7/2016    Procedure: COLONOSCOPY  (03790) CPT;  Surgeon: Jose Antonio Belle III, MD;  Location: [facility];  Service:    • CYSTOSCOPY RETROGRADE PYELOGRAM Bilateral 2017    Procedure: CYSTOSCOPY RETROGRADE PYELOGRAM;  Surgeon: Mu Blunt MD;  [facility];  Service:    • ENDOSCOPY N/A 2016    Procedure: ESOPHAGOGASTRODUODENOSCOPY WITH BIOPSY  CPTCODE:67138;  Surgeon: Jose Antonio Belle III, MD;  [facility];  Service:    • HEMORRHOIDECTOMY N/A 2016    Procedure: HEMORRHOID STAPLING;  Surgeon: Kael Lopez MD;  [facility];  Service:    • HYSTERECTOMY     • KNEE SURGERY     • LAPAROSCOPIC SALPINGOOPHERECTOMY     • PORTACATH PLACEMENT N/A 2017    Procedure: INSERTION OF PORTACATH;  Surgeon: Celso Arredondo MD;  Location: [facility];  Service:    • SHOULDER SURGERY      3 times         Social History:     Social History     Socioeconomic History   • Marital status:      Spouse name: Not on file   • Number of children: Not on file   • Years of education: Not on file   • Highest education level: Not on file   Tobacco Use   • Smoking status: Former Smoker     Packs/day: 1.00     Years: 20.00     Pack years: 20.00     Quit date: 2015     Years since quittin.0   • Smokeless tobacco: Never Used   Substance and Sexual Activity   • Alcohol use: No   • Drug use: Yes     Types: Marijuana     Comment: for pain   • Sexual activity: Defer     Birth control/protection: Surgical   Social History Narrative    ** Merged History Encounter **         Lives in [place], works as a , independent of ADL       Family History:     Family History   Problem Relation Age of Onset   • Crohn's disease Other    • Hypertension Other    • Diabetes Other    • Irritable bowel syndrome Other    • No Known Problems Father    • No Known Problems Mother        Review of Systems:     Review of Systems   Constitutional: Negative.  Negative for activity change, appetite change, chills, diaphoresis, fatigue and  unexpected weight change.   HENT: Negative for congestion, dental problem, drooling, ear discharge, ear pain, facial swelling, hearing loss, mouth sores, nosebleeds, postnasal drip, rhinorrhea, sinus pressure, sneezing, sore throat, tinnitus, trouble swallowing and voice change.    Eyes: Negative.  Negative for photophobia, pain, discharge, redness, itching and visual disturbance.   Respiratory: Negative.  Negative for apnea, cough, choking, chest tightness, shortness of breath, wheezing and stridor.    Cardiovascular: Negative.  Negative for chest pain, palpitations and leg swelling.   Gastrointestinal: Negative.  Negative for abdominal distention, abdominal pain, anal bleeding, blood in stool, constipation, diarrhea, nausea, rectal pain and vomiting.   Endocrine: Negative.  Negative for cold intolerance, heat intolerance, polydipsia, polyphagia and polyuria.   Genitourinary: Positive for difficulty urinating.   Musculoskeletal: Negative.  Negative for arthralgias, back pain, gait problem, joint swelling, myalgias, neck pain and neck stiffness.   Skin: Negative.  Negative for color change, pallor, rash and wound.   Allergic/Immunologic: Negative.  Negative for environmental allergies, food allergies and immunocompromised state.   Neurological: Negative.  Negative for dizziness, tremors, seizures, syncope, facial asymmetry, speech difficulty, weakness, light-headedness, numbness and headaches.   Hematological: Negative.  Negative for adenopathy. Does not bruise/bleed easily.   Psychiatric/Behavioral: Negative for agitation, behavioral problems, confusion, decreased concentration, dysphoric mood, hallucinations, self-injury, sleep disturbance and suicidal ideas. The patient is not nervous/anxious and is not hyperactive.    All other systems reviewed and are negative.      Physical Exam:     Physical Exam  Constitutional:       Appearance: She is well-developed.   HENT:      Head: Normocephalic and atraumatic.       Right Ear: External ear normal.      Left Ear: External ear normal.   Eyes:      Conjunctiva/sclera: Conjunctivae normal.      Pupils: Pupils are equal, round, and reactive to light.   Cardiovascular:      Rate and Rhythm: Normal rate and regular rhythm.      Heart sounds: Normal heart sounds.   Pulmonary:      Effort: Pulmonary effort is normal.      Breath sounds: Normal breath sounds.   Abdominal:      General: Bowel sounds are normal. There is no distension.      Palpations: Abdomen is soft. There is no mass.      Tenderness: There is no abdominal tenderness. There is no guarding or rebound.   Genitourinary:     General: Normal vulva.      Vagina: No vaginal discharge.   Musculoskeletal: Normal range of motion.   Skin:     General: Skin is warm and dry.   Neurological:      Mental Status: She is alert.      Deep Tendon Reflexes: Reflexes are normal and symmetric.   Psychiatric:         Behavior: Behavior normal.         Thought Content: Thought content normal.         Judgment: Judgment normal.         I have reviewed the following portions of the patient's history: allergies, current medications, past family history, past medical history, past social history, past surgical history, problem list and ROS and confirm it's accurate.      Procedure:     Urethral dilation-after an appropriate informed consent the patient was brought to the procedure suite.  The urethra was gently anesthetized with 10 cc of 2% viscous Xylocaine jelly.  After an adequate period of topical anesthesia I went aheaddilated with [place] sounds from 16-28 [place] sequentially without complication the patient was given gentamicin as prophylaxis with 80 mg  Assessment/Plan:   Urethral stricture-post traumatic has intermittent dilation  Narcotic pain medication-patient has significant acute pain that I believe would be an indication for the use of narcotic pain medication.  I discussed the significant risks of pain medication and the fact that  this will be a short only option and I will give her no more than a three-day supply of pain medication.  And I will not plan long-term medication and that this will be sent to a pain clinic if at all becomes necessary.  We discussed signing a pain medication agreement and the fact that we're going to run a state [place]BERTO review to be sure the patient is not getting pain medication from elsewhere.  If this is the case we will not give pain medication.  As part of the patient's treatment plan of there being prescribed a controlled substance with potential for abuse.  This patient has been wait aware of the appropriate dose of such medications including, the risk for somnolence, limited ability to drive and/or safety and the significant potential for overdose.  It is been made clear that these medications are for the prescribed patient only without concomitant use of alcohol or other sepsis unless prescribed by the medical provider.  Has completed prescribing agreement detailing the terms of continue prescribing him a controlled substance.  Including monitoring Luis Alberto ports, the possibility of urine drug screens and pedal counts.  The patient is aware that we reviewed LUIS ALBERTO reports on a regular basis and scan them into the chart.  History and physical examination exhibited continued safe and appropriate use of controlled substances.  Also discussed the fact that the new [place] legislation allows only a three-day prescription for pain medication.  In this situation he will be referred to a chronic pain clinic.            Patient's Body mass index is 16.24 kg/m². BMI is below normal parameters. Recommendations include: none (medical contraindication).              This document has been electronically signed by VERENICE FINK MD November 12, 2020 08:49 EST

## 2020-11-13 NOTE — DISCHARGE INSTRUCTIONS
Home care family.  Rest.  Drink lots of water.  Take your medication as prescribed.  See Mabel Patterson in the office in 1 to 2 days.  Return to the emergency department right away if symptoms worsen/any problems.  
1 pair

## 2020-12-03 ENCOUNTER — OFFICE VISIT (OUTPATIENT)
Dept: UROLOGY | Facility: CLINIC | Age: 45
End: 2020-12-03

## 2020-12-03 VITALS — TEMPERATURE: 98 F | BODY MASS INDEX: 16.14 KG/M2 | WEIGHT: 109 LBS | HEIGHT: 69 IN

## 2020-12-03 DIAGNOSIS — N35.028 OTHER POST-TRAUMATIC URETHRAL STRICTURE, FEMALE: ICD-10-CM

## 2020-12-03 DIAGNOSIS — R31.0 GROSS HEMATURIA: ICD-10-CM

## 2020-12-03 DIAGNOSIS — Z48.816 AFTERCARE FOLLOWING SURGERY OF THE GENITOURINARY SYSTEM: Primary | ICD-10-CM

## 2020-12-03 PROCEDURE — 53661 DILATION OF URETHRA: CPT | Performed by: UROLOGY

## 2020-12-03 PROCEDURE — 99213 OFFICE O/P EST LOW 20 MIN: CPT | Performed by: UROLOGY

## 2020-12-03 PROCEDURE — 96372 THER/PROPH/DIAG INJ SC/IM: CPT | Performed by: UROLOGY

## 2020-12-03 RX ORDER — GENTAMICIN SULFATE 40 MG/ML
80 INJECTION, SOLUTION INTRAMUSCULAR; INTRAVENOUS ONCE
Status: COMPLETED | OUTPATIENT
Start: 2020-12-03 | End: 2020-12-03

## 2020-12-03 RX ORDER — OXYCODONE AND ACETAMINOPHEN 10; 325 MG/1; MG/1
1 TABLET ORAL EVERY 6 HOURS PRN
Qty: 8 TABLET | Refills: 0 | Status: SHIPPED | OUTPATIENT
Start: 2020-12-03 | End: 2020-12-29 | Stop reason: SDUPTHER

## 2020-12-03 RX ADMIN — GENTAMICIN SULFATE 80 MG: 40 INJECTION, SOLUTION INTRAMUSCULAR; INTRAVENOUS at 10:38

## 2020-12-03 NOTE — PROGRESS NOTES
Chief Complaint:          Chief Complaint   Patient presents with   • Dilation     follow up        HPI:   45 y.o. female returns today for dilation of the urethra.  She has significant anatomic stenosis in the mid urethra causing severe pain recalcitrant to self dilation after prep and drape I dilated to 28 French which was very good for her she tolerated it well.      Past Medical History:        Past Medical History:   Diagnosis Date   • Abdominal pain    • Abdominal swelling    • Hoyos's disease    • Bipolar 1 disorder (CMS/HCC)    • Brain tumor (benign) (CMS/HCC)    • Brain tumor (CMS/HCC)     R Frontal Lobe per pt   • Brain tumor (CMS/HCC) 2014   • Constipation    • DDD (degenerative disc disease), cervical 05/29/2017   • DDD (degenerative disc disease), cervical    • Diarrhea    • Fibromyalgia    • IBS (irritable bowel syndrome)    • Migraine    • Nausea & vomiting    • PONV (postoperative nausea and vomiting)    • PTSD (post-traumatic stress disorder)    • Rectal bleeding          Current Meds:     Current Outpatient Medications   Medication Sig Dispense Refill   • acetaminophen-codeine (TYLENOL #3) 300-30 MG per tablet Take 1 tablet by mouth Every 6 (Six) Hours As Needed for Moderate Pain . 12 tablet 0   • albuterol (PROVENTIL HFA;VENTOLIN HFA) 108 (90 Base) MCG/ACT inhaler Inhale 2 puffs 2 (Two) Times a Day.     • Azelastine HCl 137 MCG/SPRAY solution 1 spray into each nostril As Needed (Allergies).     • busPIRone (BUSPAR) 15 MG tablet Take 15 mg by mouth 3 (three) times a day        • cefuroxime (CEFTIN) 500 MG tablet      • cetirizine (zyrTEC) 10 MG tablet Take 1 tablet by mouth Daily. 30 tablet 0   • cyclobenzaprine (FLEXERIL) 5 MG tablet      • diclofenac (VOLTAREN) 1 % gel gel      • divalproex (DEPAKOTE) 500 MG DR tablet Take 1 tablet by mouth 3 (Three) Times a Day. 90 tablet 2   • fluticasone (VERAMYST) 27.5 MCG/SPRAY nasal spray 2 sprays into each nostril Daily.     • montelukast (SINGULAIR) 10  MG tablet Take 10 mg by mouth Every Night.     • ondansetron (ZOFRAN) 4 MG tablet      • orphenadrine (NORFLEX) 100 MG 12 hr tablet Take 1 tablet by mouth 2 (Two) Times a Day. 20 tablet 0   • oxyCODONE (ROXICODONE) 5 MG immediate release tablet      • oxyCODONE-acetaminophen (Percocet)  MG per tablet Take 1 tablet by mouth Every 6 (Six) Hours As Needed for Moderate Pain . 8 tablet 0   • pantoprazole (PROTONIX) 40 MG EC tablet Take 40 mg by mouth 2 (Two) Times a Day As Needed.     • phenazopyridine (PYRIDIUM) 100 MG tablet      • polyethylene glycol (MIRALAX) powder Take 17 g by mouth Daily. 289 g 0   • promethazine (PHENERGAN) 25 MG tablet      • sertraline (ZOLOFT) 100 MG tablet Take 100 mg by mouth 2 (Two) Times a Day.     • SUMAtriptan (IMITREX) 6 MG/0.5ML solution injection Inject 6 mg under the skin 1 (One) Time.     • tiZANidine (ZANAFLEX) 4 MG tablet      • traMADol (ULTRAM) 50 MG tablet        No current facility-administered medications for this visit.         Allergies:      Allergies   Allergen Reactions   • Ativan [Lorazepam] Hallucinations     confusion   • Sulfa Antibiotics Shortness Of Breath and Swelling   • Sulfa Antibiotics Anaphylaxis   • Reglan [Metoclopramide] Angioedema   • Compazine [Prochlorperazine Edisylate] Hives   • Demerol [Meperidine] Hives   • Droperidol Itching   • Metoclopramide Swelling   • Toradol [Ketorolac Tromethamine] Hives and Itching   • Toradol [Ketorolac Tromethamine] Hives   • Zofran [Ondansetron Hcl] Rash   • Zosyn [Piperacillin Sod-Tazobactam So] Hives        Past Surgical History:     Past Surgical History:   Procedure Laterality Date   • ANAL SCOPE N/A 7/28/2016    Procedure: ANAL SCOPE;  Surgeon: Kael Lopez MD;  Location: [facility] OR;  Service:    • APPENDECTOMY     • COLONOSCOPY N/A 6/30/2016    Procedure: COLONOSCOPY  CPTCODE:99338;  Surgeon: Jose Antonio Belle III, MD;  Location: [facility] OR;  Service:    • COLONOSCOPY N/A 7/7/2016    Procedure: COLONOSCOPY  (91338) CPT;  Surgeon: Jose Antonio Belle III, MD;  Location: [facility];  Service:    • CYSTOSCOPY RETROGRADE PYELOGRAM Bilateral 2017    Procedure: CYSTOSCOPY RETROGRADE PYELOGRAM;  Surgeon: Mu Blunt MD;  Location: [facility] OR;  Service:    • ENDOSCOPY N/A 2016    Procedure: ESOPHAGOGASTRODUODENOSCOPY WITH BIOPSY  CPTCODE:80927;  Surgeon: Jose Antonio Belle III, MD;  Location: [facility] OR;  Service:    • HEMORRHOIDECTOMY N/A 2016    Procedure: HEMORRHOID STAPLING;  Surgeon: Kael Lopez MD;  Location: [facility] OR;  Service:    • HYSTERECTOMY     • KNEE SURGERY     • LAPAROSCOPIC SALPINGOOPHERECTOMY     • PORTACATH PLACEMENT N/A 2017    Procedure: INSERTION OF PORTACATH;  Surgeon: Celso Arredondo MD;  Location: [facility];  Service:    • SHOULDER SURGERY      3 times         Social History:     Social History     Socioeconomic History   • Marital status:      Spouse name: Not on file   • Number of children: Not on file   • Years of education: Not on file   • Highest education level: Not on file   Tobacco Use   • Smoking status: Former Smoker     Packs/day: 1.00     Years: 20.00     Pack years: 20.00     Quit date: 2015     Years since quittin.0   • Smokeless tobacco: Never Used   Substance and Sexual Activity   • Alcohol use: No   • Drug use: Yes     Types: Marijuana     Comment: for pain   • Sexual activity: Defer     Birth control/protection: Surgical   Social History Narrative    ** Merged History Encounter **         Lives in [place], works as a , independent of ADL       Family History:     Family History   Problem Relation Age of Onset   • Crohn's disease Other    • Hypertension Other    • Diabetes Other    • Irritable bowel syndrome Other    • No Known Problems Father    • No Known Problems Mother        Review of Systems:     Review of Systems   Constitutional: Negative.  Negative for activity change, appetite change, chills, diaphoresis, fatigue and  unexpected weight change.   HENT: Negative for congestion, dental problem, drooling, ear discharge, ear pain, facial swelling, hearing loss, mouth sores, nosebleeds, postnasal drip, rhinorrhea, sinus pressure, sneezing, sore throat, tinnitus, trouble swallowing and voice change.    Eyes: Negative.  Negative for photophobia, pain, discharge, redness, itching and visual disturbance.   Respiratory: Negative.  Negative for apnea, cough, choking, chest tightness, shortness of breath, wheezing and stridor.    Cardiovascular: Negative.  Negative for chest pain, palpitations and leg swelling.   Gastrointestinal: Negative.  Negative for abdominal distention, abdominal pain, anal bleeding, blood in stool, constipation, diarrhea, nausea, rectal pain and vomiting.   Endocrine: Negative.  Negative for cold intolerance, heat intolerance, polydipsia, polyphagia and polyuria.   Genitourinary: Positive for difficulty urinating.   Musculoskeletal: Negative.  Negative for arthralgias, back pain, gait problem, joint swelling, myalgias, neck pain and neck stiffness.   Skin: Negative.  Negative for color change, pallor, rash and wound.   Allergic/Immunologic: Negative.  Negative for environmental allergies, food allergies and immunocompromised state.   Neurological: Negative.  Negative for dizziness, tremors, seizures, syncope, facial asymmetry, speech difficulty, weakness, light-headedness, numbness and headaches.   Hematological: Negative.  Negative for adenopathy. Does not bruise/bleed easily.   Psychiatric/Behavioral: Negative for agitation, behavioral problems, confusion, decreased concentration, dysphoric mood, hallucinations, self-injury, sleep disturbance and suicidal ideas. The patient is not nervous/anxious and is not hyperactive.    All other systems reviewed and are negative.      Physical Exam:     Physical Exam  Constitutional:       Appearance: She is well-developed.   HENT:      Head: Normocephalic and atraumatic.       Right Ear: External ear normal.      Left Ear: External ear normal.   Eyes:      Conjunctiva/sclera: Conjunctivae normal.      Pupils: Pupils are equal, round, and reactive to light.   Cardiovascular:      Rate and Rhythm: Normal rate and regular rhythm.      Heart sounds: Normal heart sounds.   Pulmonary:      Effort: Pulmonary effort is normal.      Breath sounds: Normal breath sounds.   Abdominal:      General: Bowel sounds are normal. There is no distension.      Palpations: Abdomen is soft. There is no mass.      Tenderness: There is no abdominal tenderness. There is no guarding or rebound.   Genitourinary:     General: Normal vulva.      Vagina: No vaginal discharge.   Musculoskeletal: Normal range of motion.   Skin:     General: Skin is warm and dry.   Neurological:      Mental Status: She is alert.      Deep Tendon Reflexes: Reflexes are normal and symmetric.   Psychiatric:         Behavior: Behavior normal.         Thought Content: Thought content normal.         Judgment: Judgment normal.         I have reviewed the following portions of the patient's history: allergies, current medications, past family history, past medical history, past social history, past surgical history, problem list and ROS and confirm it's accurate.      Procedure:     Urethral dilation-after an appropriate informed consent the patient was brought to the procedure suite.  The urethra was gently anesthetized with 10 cc of 2% viscous Xylocaine jelly.  After an adequate period of topical anesthesia I went ahead and the urethra was gently anesthetized and dilated with [place] sounds from 16-28 [place] sequentially without complication the patient was given gentamicin as prophylaxis with 80 mg  Assessment/Plan:   Urethral stricture-post traumatic status post dilation  Narcotic pain medication-patient has significant acute pain that I believe would be an indication for the use of narcotic pain medication.  I discussed the significant  risks of pain medication and the fact that this will be a short only option and I will give her no more than a three-day supply of pain medication.  And I will not plan long-term medication and that this will be sent to a pain clinic if at all becomes necessary.  We discussed signing a pain medication agreement and the fact that we're going to run a state [place] review to be sure the patient is not getting pain medication from elsewhere.  If this is the case we will not give pain medication.  As part of the patient's treatment plan of there being prescribed a controlled substance with potential for abuse.  This patient has been wait aware of the appropriate dose of such medications including, the risk for somnolence, limited ability to drive and/or safety and the significant potential for overdose.  It is been made clear that these medications are for the prescribed patient only without concomitant use of alcohol or other sepsis unless prescribed by the medical provider.  Has completed prescribing agreement detailing the terms of continue prescribing him a controlled substance.  Including monitoring Luis Alberto ports, the possibility of urine drug screens and pedal counts.  The patient is aware that we reviewed LUIS ALBERTO reports on a regular basis and scan them into the chart.  History and physical examination exhibited continued safe and appropriate use of controlled substances.  Also discussed the fact that the new [place] legislation allows only a three-day prescription for pain medication.  In this situation he will be referred to a chronic pain clinic.            Patient's Body mass index is 16.1 kg/m². BMI is below normal parameters. Recommendations include: none (medical contraindication).              This document has been electronically signed by VERENICE FINK MD December 3, 2020 10:19 EST

## 2020-12-26 ENCOUNTER — HOSPITAL ENCOUNTER (EMERGENCY)
Facility: HOSPITAL | Age: 45
Discharge: HOME | End: 2020-12-26
Attending: EMERGENCY MEDICINE | Admitting: EMERGENCY MEDICINE

## 2020-12-26 VITALS
HEART RATE: 62 BPM | BODY MASS INDEX: 18.49 KG/M2 | OXYGEN SATURATION: 99 % | RESPIRATION RATE: 18 BRPM | WEIGHT: 122 LBS | HEIGHT: 68 IN | SYSTOLIC BLOOD PRESSURE: 112 MMHG | DIASTOLIC BLOOD PRESSURE: 73 MMHG | TEMPERATURE: 98.5 F

## 2020-12-26 DIAGNOSIS — G43.909 MIGRAINE WITHOUT STATUS MIGRAINOSUS, NOT INTRACTABLE, UNSPECIFIED MIGRAINE TYPE: Primary | ICD-10-CM

## 2020-12-26 PROCEDURE — 25010000002 BUTORPHANOL PER 1 MG: Performed by: EMERGENCY MEDICINE

## 2020-12-26 PROCEDURE — 99281 EMR DPT VST MAYX REQ PHY/QHP: CPT

## 2020-12-26 PROCEDURE — 96372 THER/PROPH/DIAG INJ SC/IM: CPT

## 2020-12-26 RX ORDER — BUTORPHANOL TARTRATE 1 MG/ML
1 INJECTION, SOLUTION INTRAMUSCULAR; INTRAVENOUS ONCE
Status: COMPLETED | OUTPATIENT
Start: 2020-12-26 | End: 2020-12-26

## 2020-12-26 RX ADMIN — BUTORPHANOL TARTRATE 1 MG: 1 INJECTION, SOLUTION INTRAMUSCULAR; INTRAVENOUS at 18:00

## 2020-12-26 NOTE — ED PROVIDER NOTES
Subjective   45-year-old female presents to the emergency room with a headache.  Patient states for the past week she has had a migraine type headache.  Patient states she has been under a lot of stress recently and has taken over-the-counter medications with no resolution of her headache.  Denies nausea, vomiting, or visual changes.  Aggravating factors include loud noises and bright lights.  Denies being around any sick contacts or travel.  Denies any other complaints or concerns at this time.        History provided by:  Patient   used: No        Review of Systems   Constitutional: Negative.  Negative for fever.   Respiratory: Negative.    Cardiovascular: Negative.  Negative for chest pain.   Gastrointestinal: Negative.  Negative for abdominal pain.   Endocrine: Negative.    Genitourinary: Negative.  Negative for dysuria.   Skin: Negative.    Neurological: Positive for headaches.   Psychiatric/Behavioral: Negative.    All other systems reviewed and are negative.      Past Medical History:   Diagnosis Date   • Abdominal pain    • Abdominal swelling    • Hoyos's disease    • Bipolar 1 disorder (CMS/HCC)    • Brain tumor (benign) (CMS/HCC)    • Brain tumor (CMS/HCC)     R Frontal Lobe per pt   • Brain tumor (CMS/HCC) 2014   • Constipation    • DDD (degenerative disc disease), cervical 05/29/2017   • DDD (degenerative disc disease), cervical    • Diarrhea    • Fibromyalgia    • IBS (irritable bowel syndrome)    • Migraine    • Nausea & vomiting    • PONV (postoperative nausea and vomiting)    • PTSD (post-traumatic stress disorder)    • Rectal bleeding        Allergies   Allergen Reactions   • Ativan [Lorazepam] Hallucinations     confusion   • Sulfa Antibiotics Shortness Of Breath and Swelling   • Sulfa Antibiotics Anaphylaxis   • Reglan [Metoclopramide] Angioedema   • Compazine [Prochlorperazine Edisylate] Hives   • Demerol [Meperidine] Hives   • Droperidol Itching   • Metoclopramide Swelling    • Toradol [Ketorolac Tromethamine] Hives and Itching   • Toradol [Ketorolac Tromethamine] Hives   • Zofran [Ondansetron Hcl] Rash   • Zosyn [Piperacillin Sod-Tazobactam So] Hives       Past Surgical History:   Procedure Laterality Date   • ANAL SCOPE N/A 7/28/2016    Procedure: ANAL SCOPE;  Surgeon: Kael Lopez MD;  Location: [facility] OR;  Service:    • APPENDECTOMY     • COLONOSCOPY N/A 6/30/2016    Procedure: COLONOSCOPY  CPTCODE:60275;  Surgeon: Jose Antonio Belle III, MD;  Location: [facility] OR;  Service:    • COLONOSCOPY N/A 7/7/2016    Procedure: COLONOSCOPY (26309) CPT;  Surgeon: Jose Antonio Belle III, MD;  Location: [facility] OR;  Service:    • CYSTOSCOPY RETROGRADE PYELOGRAM Bilateral 4/28/2017    Procedure: CYSTOSCOPY RETROGRADE PYELOGRAM;  Surgeon: Mu Blunt MD;  Location: [facility] OR;  Service:    • ENDOSCOPY N/A 6/30/2016    Procedure: ESOPHAGOGASTRODUODENOSCOPY WITH BIOPSY  CPTCODE:93443;  Surgeon: Jose Antonio Belle III, MD;  Location: [facility] OR;  Service:    • HEMORRHOIDECTOMY N/A 7/28/2016    Procedure: HEMORRHOID STAPLING;  Surgeon: Kael Lopez MD;  Location: [facility] OR;  Service:    • HYSTERECTOMY     • KNEE SURGERY     • LAPAROSCOPIC SALPINGOOPHERECTOMY     • PORTACATH PLACEMENT N/A 7/28/2017    Procedure: INSERTION OF PORTACATH;  Surgeon: Celso Arredondo MD;  Location: [facility] OR;  Service:    • SHOULDER SURGERY      3 times       Family History   Problem Relation Age of Onset   • Crohn's disease Other    • Hypertension Other    • Diabetes Other    • Irritable bowel syndrome Other    • No Known Problems Father    • No Known Problems Mother        Social History     Socioeconomic History   • Marital status:      Spouse name: Not on file   • Number of children: Not on file   • Years of education: Not on file   • Highest education level: Not on file   Tobacco Use   • Smoking status: Former Smoker     Packs/day: 1.00     Years: 20.00     Pack years: 20.00     Quit date:  2015     Years since quittin.1   • Smokeless tobacco: Never Used   Substance and Sexual Activity   • Alcohol use: No   • Drug use: Yes     Types: Marijuana     Comment: for pain   • Sexual activity: Defer     Birth control/protection: Surgical   Social History Narrative    ** Merged History Encounter **         Lives in [place], works as a , independent of ADL           Objective   Physical Exam  Vitals signs and nursing note reviewed.   Constitutional:       General: She is not in acute distress.     Appearance: She is well-developed. She is not diaphoretic.   HENT:      Head: Normocephalic and atraumatic.      Right Ear: External ear normal.      Left Ear: External ear normal.      Nose: Nose normal.   Eyes:      Conjunctiva/sclera: Conjunctivae normal.      Pupils: Pupils are equal, round, and reactive to light.   Neck:      Musculoskeletal: Normal range of motion and neck supple.      Vascular: No JVD.      Trachea: No tracheal deviation.   Cardiovascular:      Rate and Rhythm: Normal rate and regular rhythm.      Heart sounds: Normal heart sounds. No murmur.   Pulmonary:      Effort: Pulmonary effort is normal. No respiratory distress.      Breath sounds: Normal breath sounds. No wheezing.   Abdominal:      General: Bowel sounds are normal.      Palpations: Abdomen is soft.      Tenderness: There is no abdominal tenderness.   Musculoskeletal: Normal range of motion.         General: No deformity.   Skin:     General: Skin is warm and dry.      Coloration: Skin is not pale.      Findings: No erythema or rash.   Neurological:      Mental Status: She is alert and oriented to person, place, and time.      Cranial Nerves: No cranial nerve deficit.   Psychiatric:         Behavior: Behavior normal.         Thought Content: Thought content normal.         Procedures           ED Course  ED Course as of Dec 26 2119   Sat Dec 26, 2020   1820 Pt reports much improvement of HA. Will prepare for discharge.     [TK]      ED Course User Index  [TK] Han Varghese PA-C                                           MDM  Number of Diagnoses or Management Options  Migraine without status migrainosus, not intractable, unspecified migraine type: new and does not require workup  Risk of Complications, Morbidity, and/or Mortality  Presenting problems: low  Diagnostic procedures: minimal  Management options: moderate    Patient Progress  Patient progress: stable      Final diagnoses:   Migraine without status migrainosus, not intractable, unspecified migraine type            Han Vagrhese PA-C  12/26/20 2011

## 2020-12-29 ENCOUNTER — OFFICE VISIT (OUTPATIENT)
Dept: UROLOGY | Facility: CLINIC | Age: 45
End: 2020-12-29

## 2020-12-29 VITALS — BODY MASS INDEX: 18.55 KG/M2 | HEIGHT: 68 IN

## 2020-12-29 DIAGNOSIS — N35.028 OTHER POST-TRAUMATIC URETHRAL STRICTURE, FEMALE: ICD-10-CM

## 2020-12-29 DIAGNOSIS — R31.0 GROSS HEMATURIA: ICD-10-CM

## 2020-12-29 DIAGNOSIS — Z48.816 AFTERCARE FOLLOWING SURGERY OF THE GENITOURINARY SYSTEM: Primary | ICD-10-CM

## 2020-12-29 PROCEDURE — 96372 THER/PROPH/DIAG INJ SC/IM: CPT | Performed by: UROLOGY

## 2020-12-29 PROCEDURE — 99213 OFFICE O/P EST LOW 20 MIN: CPT | Performed by: UROLOGY

## 2020-12-29 PROCEDURE — 53661 DILATION OF URETHRA: CPT | Performed by: UROLOGY

## 2020-12-29 RX ORDER — OXYCODONE AND ACETAMINOPHEN 10; 325 MG/1; MG/1
1 TABLET ORAL EVERY 6 HOURS PRN
Qty: 8 TABLET | Refills: 0 | Status: SHIPPED | OUTPATIENT
Start: 2020-12-29 | End: 2021-02-09 | Stop reason: SDUPTHER

## 2020-12-29 RX ORDER — GENTAMICIN SULFATE 40 MG/ML
80 INJECTION, SOLUTION INTRAMUSCULAR; INTRAVENOUS ONCE
Status: COMPLETED | OUTPATIENT
Start: 2020-12-29 | End: 2020-12-29

## 2020-12-29 RX ADMIN — GENTAMICIN SULFATE 80 MG: 40 INJECTION, SOLUTION INTRAMUSCULAR; INTRAVENOUS at 11:36

## 2020-12-29 NOTE — PROGRESS NOTES
Chief Complaint:          Chief Complaint   Patient presents with   • dilation     f/u       HPI:   45 y.o. female  returns today for dilation of the urethra.  She has significant anatomic stenosis in the mid urethra causing severe pain recalcitrant to self dilation after prep and drape I dilated to 28 French which was very good for her she tolerated it well.      Past Medical History:        Past Medical History:   Diagnosis Date   • Abdominal pain    • Abdominal swelling    • Hoyos's disease    • Bipolar 1 disorder (CMS/HCC)    • Brain tumor (benign) (CMS/HCC)    • Brain tumor (CMS/HCC)     R Frontal Lobe per pt   • Brain tumor (CMS/HCC) 2014   • Constipation    • DDD (degenerative disc disease), cervical 05/29/2017   • DDD (degenerative disc disease), cervical    • Diarrhea    • Fibromyalgia    • IBS (irritable bowel syndrome)    • Migraine    • Nausea & vomiting    • PONV (postoperative nausea and vomiting)    • PTSD (post-traumatic stress disorder)    • Rectal bleeding          Current Meds:     Current Outpatient Medications   Medication Sig Dispense Refill   • acetaminophen-codeine (TYLENOL #3) 300-30 MG per tablet Take 1 tablet by mouth Every 6 (Six) Hours As Needed for Moderate Pain . 12 tablet 0   • albuterol (PROVENTIL HFA;VENTOLIN HFA) 108 (90 Base) MCG/ACT inhaler Inhale 2 puffs 2 (Two) Times a Day.     • Azelastine HCl 137 MCG/SPRAY solution 1 spray into each nostril As Needed (Allergies).     • busPIRone (BUSPAR) 15 MG tablet Take 15 mg by mouth 3 (three) times a day        • cefuroxime (CEFTIN) 500 MG tablet      • cetirizine (zyrTEC) 10 MG tablet Take 1 tablet by mouth Daily. 30 tablet 0   • cyclobenzaprine (FLEXERIL) 5 MG tablet      • diclofenac (VOLTAREN) 1 % gel gel      • divalproex (DEPAKOTE) 500 MG DR tablet Take 1 tablet by mouth 3 (Three) Times a Day. 90 tablet 2   • fluticasone (VERAMYST) 27.5 MCG/SPRAY nasal spray 2 sprays into each nostril Daily.     • montelukast (SINGULAIR) 10 MG  tablet Take 10 mg by mouth Every Night.     • ondansetron (ZOFRAN) 4 MG tablet      • orphenadrine (NORFLEX) 100 MG 12 hr tablet Take 1 tablet by mouth 2 (Two) Times a Day. 20 tablet 0   • oxyCODONE (ROXICODONE) 5 MG immediate release tablet      • oxyCODONE-acetaminophen (Percocet)  MG per tablet Take 1 tablet by mouth Every 6 (Six) Hours As Needed for Moderate Pain . 8 tablet 0   • pantoprazole (PROTONIX) 40 MG EC tablet Take 40 mg by mouth 2 (Two) Times a Day As Needed.     • phenazopyridine (PYRIDIUM) 100 MG tablet      • polyethylene glycol (MIRALAX) powder Take 17 g by mouth Daily. 289 g 0   • promethazine (PHENERGAN) 25 MG tablet      • sertraline (ZOLOFT) 100 MG tablet Take 100 mg by mouth 2 (Two) Times a Day.     • SUMAtriptan (IMITREX) 6 MG/0.5ML solution injection Inject 6 mg under the skin 1 (One) Time.     • tiZANidine (ZANAFLEX) 4 MG tablet      • traMADol (ULTRAM) 50 MG tablet        No current facility-administered medications for this visit.         Allergies:      Allergies   Allergen Reactions   • Ativan [Lorazepam] Hallucinations     confusion   • Sulfa Antibiotics Shortness Of Breath and Swelling   • Sulfa Antibiotics Anaphylaxis   • Reglan [Metoclopramide] Angioedema   • Compazine [Prochlorperazine Edisylate] Hives   • Demerol [Meperidine] Hives   • Droperidol Itching   • Metoclopramide Swelling   • Toradol [Ketorolac Tromethamine] Hives and Itching   • Toradol [Ketorolac Tromethamine] Hives   • Zofran [Ondansetron Hcl] Rash   • Zosyn [Piperacillin Sod-Tazobactam So] Hives        Past Surgical History:     Past Surgical History:   Procedure Laterality Date   • ANAL SCOPE N/A 7/28/2016    Procedure: ANAL SCOPE;  Surgeon: Kael Lopez MD;  Location:  [facility] OR;  Service:    • APPENDECTOMY     • COLONOSCOPY N/A 6/30/2016    Procedure: COLONOSCOPY  CPTCODE:03999;  Surgeon: Jose Antonio Belle III, MD;  Location: [facility] OR;  Service:    • COLONOSCOPY N/A 7/7/2016    Procedure: COLONOSCOPY  (63954) CPT;  Surgeon: Jose Antonio Belle III, MD;  Location: [facility];  Service:    • CYSTOSCOPY RETROGRADE PYELOGRAM Bilateral 2017    Procedure: CYSTOSCOPY RETROGRADE PYELOGRAM;  Surgeon: Mu Blunt MD;  Location: [facility] OR;  Service:    • ENDOSCOPY N/A 2016    Procedure: ESOPHAGOGASTRODUODENOSCOPY WITH BIOPSY  CPTCODE:28212;  Surgeon: Jose Antonio Belle III, MD;  Location: [facility] OR;  Service:    • HEMORRHOIDECTOMY N/A 2016    Procedure: HEMORRHOID STAPLING;  Surgeon: Kael Lopez MD;  Location: [facility] OR;  Service:    • HYSTERECTOMY     • KNEE SURGERY     • LAPAROSCOPIC SALPINGOOPHERECTOMY     • PORTACATH PLACEMENT N/A 2017    Procedure: INSERTION OF PORTACATH;  Surgeon: Celso Arredondo MD;  Location: [facility];  Service:    • SHOULDER SURGERY      3 times         Social History:     Social History     Socioeconomic History   • Marital status:      Spouse name: Not on file   • Number of children: Not on file   • Years of education: Not on file   • Highest education level: Not on file   Tobacco Use   • Smoking status: Former Smoker     Packs/day: 1.00     Years: 20.00     Pack years: 20.00     Quit date: 2015     Years since quittin.1   • Smokeless tobacco: Never Used   Substance and Sexual Activity   • Alcohol use: No   • Drug use: Yes     Types: Marijuana     Comment: for pain   • Sexual activity: Defer     Birth control/protection: Surgical   Social History Narrative    ** Merged History Encounter **         Lives in [place], works as a , independent of ADL       Family History:     Family History   Problem Relation Age of Onset   • Crohn's disease Other    • Hypertension Other    • Diabetes Other    • Irritable bowel syndrome Other    • No Known Problems Father    • No Known Problems Mother        Review of Systems:     Review of Systems   Constitutional: Negative.  Negative for activity change, appetite change, chills, diaphoresis, fatigue and  unexpected weight change.   HENT: Negative for congestion, dental problem, drooling, ear discharge, ear pain, facial swelling, hearing loss, mouth sores, nosebleeds, postnasal drip, rhinorrhea, sinus pressure, sneezing, sore throat, tinnitus, trouble swallowing and voice change.    Eyes: Negative.  Negative for photophobia, pain, discharge, redness, itching and visual disturbance.   Respiratory: Negative.  Negative for apnea, cough, choking, chest tightness, shortness of breath, wheezing and stridor.    Cardiovascular: Negative.  Negative for chest pain, palpitations and leg swelling.   Gastrointestinal: Negative.  Negative for abdominal distention, abdominal pain, anal bleeding, blood in stool, constipation, diarrhea, nausea, rectal pain and vomiting.   Endocrine: Negative.  Negative for cold intolerance, heat intolerance, polydipsia, polyphagia and polyuria.   Genitourinary: Positive for difficulty urinating.   Musculoskeletal: Negative.  Negative for arthralgias, back pain, gait problem, joint swelling, myalgias, neck pain and neck stiffness.   Skin: Negative.  Negative for color change, pallor, rash and wound.   Allergic/Immunologic: Negative.  Negative for environmental allergies, food allergies and immunocompromised state.   Neurological: Negative.  Negative for dizziness, tremors, seizures, syncope, facial asymmetry, speech difficulty, weakness, light-headedness, numbness and headaches.   Hematological: Negative.  Negative for adenopathy. Does not bruise/bleed easily.   Psychiatric/Behavioral: Negative for agitation, behavioral problems, confusion, decreased concentration, dysphoric mood, hallucinations, self-injury, sleep disturbance and suicidal ideas. The patient is not nervous/anxious and is not hyperactive.    All other systems reviewed and are negative.      Physical Exam:     Physical Exam  Constitutional:       Appearance: She is well-developed.   HENT:      Head: Normocephalic and atraumatic.       Right Ear: External ear normal.      Left Ear: External ear normal.   Eyes:      Conjunctiva/sclera: Conjunctivae normal.      Pupils: Pupils are equal, round, and reactive to light.   Cardiovascular:      Rate and Rhythm: Normal rate and regular rhythm.      Heart sounds: Normal heart sounds.   Pulmonary:      Effort: Pulmonary effort is normal.      Breath sounds: Normal breath sounds.   Abdominal:      General: Bowel sounds are normal. There is no distension.      Palpations: Abdomen is soft. There is no mass.      Tenderness: There is no abdominal tenderness. There is no guarding or rebound.   Genitourinary:     General: Normal vulva.      Vagina: No vaginal discharge.   Musculoskeletal: Normal range of motion.   Skin:     General: Skin is warm and dry.   Neurological:      Mental Status: She is alert.      Deep Tendon Reflexes: Reflexes are normal and symmetric.   Psychiatric:         Behavior: Behavior normal.         Thought Content: Thought content normal.         Judgment: Judgment normal.         I have reviewed the following portions of the patient's history: allergies, current medications, past family history, past medical history, past social history, past surgical history, problem list and ROS and confirm it's accurate.        Procedure:   Urethral dilation-after an appropriate informed consent the patient was brought to the procedure suite.  The urethra was gently anesthetized with 10 cc of 2% viscous Xylocaine jelly.  After an adequate period of topical anesthesia I went ahead and  dilated with [place] sounds from 16-30 [place] sequentially without complication the patient was given gentamicin as prophylaxis with 80 mg    Assessment/Plan:   Post infective urethral stricture status post dilation  Narcotic pain medication-patient has significant acute pain that I believe would be an indication for the use of narcotic pain medication.  I discussed the significant risks of pain medication and the fact  that this will be a short only option and I will give her no more than a three-day supply of pain medication.  And I will not plan long-term medication and that this will be sent to a pain clinic if at all becomes necessary.  We discussed signing a pain medication agreement and the fact that we're going to run a state LUIS ALBERTO review to be sure the patient is not getting pain medication from elsewhere.  If this is the case we will not give pain medication.  As part of the patient's treatment plan of there being prescribed a controlled substance with potential for abuse.  This patient has been wait aware of the appropriate dose of such medications including, the risk for somnolence, limited ability to drive and/or safety and the significant potential for overdose.  It is been made clear that these medications are for the prescribed patient only without concomitant use of alcohol or other sepsis unless prescribed by the medical provider.  Has completed prescribing agreement detailing the terms of continue prescribing him a controlled substance.  Including monitoring Luis Alberto ports, the possibility of urine drug screens and pedal counts.  The patient is aware that we reviewed LUIS ALBERTO reports on a regular basis and scan them into the chart.  History and physical examination exhibited continued safe and appropriate use of controlled substances.  Also discussed the fact that the new [place] legislation allows only a three-day prescription for pain medication.  In this situation he will be referred to a chronic pain clinic.  Very limited number of pain medication was authorized by her pain clinic to use after procedures            Patient's Body mass index is 18.55 kg/m². BMI is within normal parameters. No follow-up required..              This document has been electronically signed by VERENICE FINK MD December 29, 2020 11:01 EST

## 2021-01-25 ENCOUNTER — HOSPITAL ENCOUNTER (EMERGENCY)
Facility: HOSPITAL | Age: 46
Discharge: HOME | End: 2021-01-25
Attending: EMERGENCY MEDICINE | Admitting: FAMILY MEDICINE

## 2021-01-25 VITALS
RESPIRATION RATE: 18 BRPM | OXYGEN SATURATION: 98 % | WEIGHT: 122 LBS | SYSTOLIC BLOOD PRESSURE: 122 MMHG | TEMPERATURE: 97.9 F | HEART RATE: 77 BPM | HEIGHT: 68 IN | BODY MASS INDEX: 18.49 KG/M2 | DIASTOLIC BLOOD PRESSURE: 74 MMHG

## 2021-01-25 DIAGNOSIS — G43.909 MIGRAINE WITHOUT STATUS MIGRAINOSUS, NOT INTRACTABLE, UNSPECIFIED MIGRAINE TYPE: Primary | ICD-10-CM

## 2021-01-25 PROCEDURE — 96375 TX/PRO/DX INJ NEW DRUG ADDON: CPT

## 2021-01-25 PROCEDURE — 63710000001 PROMETHAZINE PER 25 MG: Performed by: PHYSICIAN ASSISTANT

## 2021-01-25 PROCEDURE — 25010000002 BUTORPHANOL PER 1 MG: Performed by: FAMILY MEDICINE

## 2021-01-25 PROCEDURE — 25010000002 DEXAMETHASONE PER 1 MG: Performed by: PHYSICIAN ASSISTANT

## 2021-01-25 PROCEDURE — 96374 THER/PROPH/DIAG INJ IV PUSH: CPT

## 2021-01-25 PROCEDURE — 99283 EMERGENCY DEPT VISIT LOW MDM: CPT

## 2021-01-25 RX ORDER — SODIUM CHLORIDE 0.9 % (FLUSH) 0.9 %
10 SYRINGE (ML) INJECTION AS NEEDED
Status: DISCONTINUED | OUTPATIENT
Start: 2021-01-25 | End: 2021-01-25 | Stop reason: HOSPADM

## 2021-01-25 RX ORDER — BUTORPHANOL TARTRATE 1 MG/ML
1 INJECTION, SOLUTION INTRAMUSCULAR; INTRAVENOUS ONCE
Status: COMPLETED | OUTPATIENT
Start: 2021-01-25 | End: 2021-01-25

## 2021-01-25 RX ORDER — DEXAMETHASONE SODIUM PHOSPHATE 4 MG/ML
6 INJECTION, SOLUTION INTRA-ARTICULAR; INTRALESIONAL; INTRAMUSCULAR; INTRAVENOUS; SOFT TISSUE ONCE
Status: COMPLETED | OUTPATIENT
Start: 2021-01-25 | End: 2021-01-25

## 2021-01-25 RX ORDER — PROMETHAZINE HYDROCHLORIDE 25 MG/1
25 TABLET ORAL ONCE
Status: COMPLETED | OUTPATIENT
Start: 2021-01-25 | End: 2021-01-25

## 2021-01-25 RX ADMIN — SODIUM CHLORIDE 1000 ML: 9 INJECTION, SOLUTION INTRAVENOUS at 18:37

## 2021-01-25 RX ADMIN — BUTORPHANOL TARTRATE 1 MG: 1 INJECTION, SOLUTION INTRAMUSCULAR; INTRAVENOUS at 18:38

## 2021-01-25 RX ADMIN — PROMETHAZINE HYDROCHLORIDE 25 MG: 25 TABLET ORAL at 18:37

## 2021-01-25 RX ADMIN — DEXAMETHASONE SODIUM PHOSPHATE 6 MG: 4 INJECTION INTRA-ARTICULAR; INTRALESIONAL; INTRAMUSCULAR; INTRAVENOUS; SOFT TISSUE at 18:37

## 2021-02-09 ENCOUNTER — OFFICE VISIT (OUTPATIENT)
Dept: UROLOGY | Facility: CLINIC | Age: 46
End: 2021-02-09

## 2021-02-09 VITALS — HEIGHT: 68 IN | BODY MASS INDEX: 18.48 KG/M2 | TEMPERATURE: 98.5 F | WEIGHT: 121.91 LBS

## 2021-02-09 DIAGNOSIS — N35.028 OTHER POST-TRAUMATIC URETHRAL STRICTURE, FEMALE: Primary | ICD-10-CM

## 2021-02-09 DIAGNOSIS — R31.0 GROSS HEMATURIA: ICD-10-CM

## 2021-02-09 PROCEDURE — 99213 OFFICE O/P EST LOW 20 MIN: CPT | Performed by: UROLOGY

## 2021-02-09 PROCEDURE — 53661 DILATION OF URETHRA: CPT | Performed by: UROLOGY

## 2021-02-09 PROCEDURE — 96372 THER/PROPH/DIAG INJ SC/IM: CPT | Performed by: UROLOGY

## 2021-02-09 RX ORDER — GENTAMICIN SULFATE 40 MG/ML
80 INJECTION, SOLUTION INTRAMUSCULAR; INTRAVENOUS ONCE
Status: COMPLETED | OUTPATIENT
Start: 2021-02-09 | End: 2021-02-09

## 2021-02-09 RX ORDER — OXYCODONE AND ACETAMINOPHEN 10; 325 MG/1; MG/1
1 TABLET ORAL EVERY 6 HOURS PRN
Qty: 8 TABLET | Refills: 0 | Status: SHIPPED | OUTPATIENT
Start: 2021-02-09 | End: 2021-03-16 | Stop reason: SDUPTHER

## 2021-02-09 RX ADMIN — GENTAMICIN SULFATE 80 MG: 40 INJECTION, SOLUTION INTRAMUSCULAR; INTRAVENOUS at 15:27

## 2021-02-09 NOTE — PROGRESS NOTES
Chief Complaint:          Chief Complaint   Patient presents with   • URETHRAL STRICTURE       HPI:   46 y.o. female returns today she has a severe postprocedural urethral stricture in the distal third quite tight requiring anesthesia locally and dilation she reports no fevers chills blood in the urine etc. no other complaints or problems.      Past Medical History:        Past Medical History:   Diagnosis Date   • Abdominal pain    • Abdominal swelling    • Hoyos's disease    • Bipolar 1 disorder (CMS/HCC)    • Brain tumor (benign) (CMS/HCC)    • Brain tumor (CMS/HCC)     R Frontal Lobe per pt   • Brain tumor (CMS/HCC) 2014   • Constipation    • DDD (degenerative disc disease), cervical 05/29/2017   • DDD (degenerative disc disease), cervical    • Diarrhea    • Fibromyalgia    • IBS (irritable bowel syndrome)    • Migraine    • Nausea & vomiting    • PONV (postoperative nausea and vomiting)    • PTSD (post-traumatic stress disorder)    • Rectal bleeding          Current Meds:     Current Outpatient Medications   Medication Sig Dispense Refill   • acetaminophen-codeine (TYLENOL #3) 300-30 MG per tablet Take 1 tablet by mouth Every 6 (Six) Hours As Needed for Moderate Pain . 12 tablet 0   • albuterol (PROVENTIL HFA;VENTOLIN HFA) 108 (90 Base) MCG/ACT inhaler Inhale 2 puffs 2 (Two) Times a Day.     • Azelastine HCl 137 MCG/SPRAY solution 1 spray into each nostril As Needed (Allergies).     • busPIRone (BUSPAR) 15 MG tablet Take 15 mg by mouth 3 (three) times a day        • cefuroxime (CEFTIN) 500 MG tablet      • cetirizine (zyrTEC) 10 MG tablet Take 1 tablet by mouth Daily. 30 tablet 0   • cyclobenzaprine (FLEXERIL) 5 MG tablet      • diclofenac (VOLTAREN) 1 % gel gel      • divalproex (DEPAKOTE) 500 MG DR tablet Take 1 tablet by mouth 3 (Three) Times a Day. 90 tablet 2   • fluticasone (VERAMYST) 27.5 MCG/SPRAY nasal spray 2 sprays into each nostril Daily.     • montelukast (SINGULAIR) 10 MG tablet Take 10 mg by  mouth Every Night.     • ondansetron (ZOFRAN) 4 MG tablet      • orphenadrine (NORFLEX) 100 MG 12 hr tablet Take 1 tablet by mouth 2 (Two) Times a Day. 20 tablet 0   • oxyCODONE (ROXICODONE) 5 MG immediate release tablet      • oxyCODONE-acetaminophen (Percocet)  MG per tablet Take 1 tablet by mouth Every 6 (Six) Hours As Needed for Moderate Pain . 8 tablet 0   • pantoprazole (PROTONIX) 40 MG EC tablet Take 40 mg by mouth 2 (Two) Times a Day As Needed.     • phenazopyridine (PYRIDIUM) 100 MG tablet      • polyethylene glycol (MIRALAX) powder Take 17 g by mouth Daily. 289 g 0   • promethazine (PHENERGAN) 25 MG tablet      • sertraline (ZOLOFT) 100 MG tablet Take 100 mg by mouth 2 (Two) Times a Day.     • SUMAtriptan (IMITREX) 6 MG/0.5ML solution injection Inject 6 mg under the skin 1 (One) Time.     • tiZANidine (ZANAFLEX) 4 MG tablet      • traMADol (ULTRAM) 50 MG tablet        No current facility-administered medications for this visit.         Allergies:      Allergies   Allergen Reactions   • Ativan [Lorazepam] Hallucinations     confusion   • Sulfa Antibiotics Shortness Of Breath and Swelling   • Sulfa Antibiotics Anaphylaxis   • Reglan [Metoclopramide] Angioedema   • Compazine [Prochlorperazine Edisylate] Hives   • Demerol [Meperidine] Hives   • Droperidol Itching   • Metoclopramide Swelling   • Toradol [Ketorolac Tromethamine] Hives and Itching   • Toradol [Ketorolac Tromethamine] Hives   • Zofran [Ondansetron Hcl] Rash   • Zosyn [Piperacillin Sod-Tazobactam So] Hives        Past Surgical History:     Past Surgical History:   Procedure Laterality Date   • ANAL SCOPE N/A 7/28/2016    Procedure: ANAL SCOPE;  Surgeon: Kael Lopez MD;  Location:  [facility] OR;  Service:    • APPENDECTOMY     • COLONOSCOPY N/A 6/30/2016    Procedure: COLONOSCOPY  CPTCODE:91610;  Surgeon: Jose Antonio Belle III, MD;  Location:  [facility] OR;  Service:    • COLONOSCOPY N/A 7/7/2016    Procedure: COLONOSCOPY (51711) CPT;  Surgeon:  Jose Antonio Belle III, MD;  Location: [facility];  Service:    • CYSTOSCOPY RETROGRADE PYELOGRAM Bilateral 2017    Procedure: CYSTOSCOPY RETROGRADE PYELOGRAM;  Surgeon: Mu Blunt MD;  Location: [facility];  Service:    • ENDOSCOPY N/A 2016    Procedure: ESOPHAGOGASTRODUODENOSCOPY WITH BIOPSY  CPTCODE:84261;  Surgeon: Jose Antonio Belle III, MD;  Location: [facility];  Service:    • HEMORRHOIDECTOMY N/A 2016    Procedure: HEMORRHOID STAPLING;  Surgeon: Kael Lopez MD;  Location: [facility];  Service:    • HYSTERECTOMY     • KNEE SURGERY     • LAPAROSCOPIC SALPINGOOPHERECTOMY     • PORTACATH PLACEMENT N/A 2017    Procedure: INSERTION OF PORTACATH;  Surgeon: Celso Arredondo MD;  Location: [facility];  Service:    • SHOULDER SURGERY      3 times         Social History:     Social History     Socioeconomic History   • Marital status:      Spouse name: Not on file   • Number of children: Not on file   • Years of education: Not on file   • Highest education level: Not on file   Tobacco Use   • Smoking status: Former Smoker     Packs/day: 1.00     Years: 20.00     Pack years: 20.00     Quit date: 2015     Years since quittin.2   • Smokeless tobacco: Never Used   Substance and Sexual Activity   • Alcohol use: No   • Drug use: Yes     Types: Marijuana     Comment: for pain   • Sexual activity: Defer     Birth control/protection: Surgical   Social History Narrative    ** Merged History Encounter **         Lives in [place], works as a , independent of ADL       Family History:     Family History   Problem Relation Age of Onset   • Crohn's disease Other    • Hypertension Other    • Diabetes Other    • Irritable bowel syndrome Other    • No Known Problems Father    • No Known Problems Mother        Review of Systems:     Review of Systems   Constitutional: Negative.  Negative for activity change, appetite change, chills, diaphoresis, fatigue and unexpected weight  change.   HENT: Negative for congestion, dental problem, drooling, ear discharge, ear pain, facial swelling, hearing loss, mouth sores, nosebleeds, postnasal drip, rhinorrhea, sinus pressure, sneezing, sore throat, tinnitus, trouble swallowing and voice change.    Eyes: Negative.  Negative for photophobia, pain, discharge, redness, itching and visual disturbance.   Respiratory: Negative.  Negative for apnea, cough, choking, chest tightness, shortness of breath, wheezing and stridor.    Cardiovascular: Negative.  Negative for chest pain, palpitations and leg swelling.   Gastrointestinal: Negative.  Negative for abdominal distention, abdominal pain, anal bleeding, blood in stool, constipation, diarrhea, nausea, rectal pain and vomiting.   Endocrine: Negative.  Negative for cold intolerance, heat intolerance, polydipsia, polyphagia and polyuria.   Genitourinary: Positive for difficulty urinating.   Musculoskeletal: Negative.  Negative for arthralgias, back pain, gait problem, joint swelling, myalgias, neck pain and neck stiffness.   Skin: Negative.  Negative for color change, pallor, rash and wound.   Allergic/Immunologic: Negative.  Negative for environmental allergies, food allergies and immunocompromised state.   Neurological: Negative.  Negative for dizziness, tremors, seizures, syncope, facial asymmetry, speech difficulty, weakness, light-headedness, numbness and headaches.   Hematological: Negative.  Negative for adenopathy. Does not bruise/bleed easily.   Psychiatric/Behavioral: Negative for agitation, behavioral problems, confusion, decreased concentration, dysphoric mood, hallucinations, self-injury, sleep disturbance and suicidal ideas. The patient is not nervous/anxious and is not hyperactive.    All other systems reviewed and are negative.      Physical Exam:     Physical Exam  Constitutional:       Appearance: She is well-developed.   HENT:      Head: Normocephalic and atraumatic.      Right Ear: External  ear normal.      Left Ear: External ear normal.   Eyes:      Conjunctiva/sclera: Conjunctivae normal.      Pupils: Pupils are equal, round, and reactive to light.   Cardiovascular:      Rate and Rhythm: Normal rate and regular rhythm.      Heart sounds: Normal heart sounds.   Pulmonary:      Effort: Pulmonary effort is normal.      Breath sounds: Normal breath sounds.   Abdominal:      General: Bowel sounds are normal. There is no distension.      Palpations: Abdomen is soft. There is no mass.      Tenderness: There is no abdominal tenderness. There is no guarding or rebound.   Genitourinary:     Vagina: No vaginal discharge.   Musculoskeletal: Normal range of motion.   Skin:     General: Skin is warm and dry.   Neurological:      Mental Status: She is alert.      Deep Tendon Reflexes: Reflexes are normal and symmetric.   Psychiatric:         Behavior: Behavior normal.         Thought Content: Thought content normal.         Judgment: Judgment normal.         I have reviewed the following portions of the patient's history: allergies, current medications, past family history, past medical history, past social history, past surgical history, problem list and ROS and confirm it's accurate.      Procedure:     Urethral dilation-after an appropriate informed consent the patient was brought to the procedure suite.  The urethra was gently anesthetized with 10 cc of 2% viscous Xylocaine jelly.  After an adequate period of topical anesthesia I went ahead and dilated with [place] sounds from 16-28 [place] sequentially without complication the patient was given gentamicin as prophylaxis with 80 mg IM  Assessment/Plan:   Postprocedural urethral stricture-status post successful dilation.  Narcotic pain medication-patient has significant acute pain that I believe would be an indication for the use of narcotic pain medication.  I discussed the significant risks of pain medication and the fact that this will be a short only option  and I will give her no more than a three-day supply of pain medication.  And I will not plan long-term medication and that this will be sent to a pain clinic if at all becomes necessary.  We discussed signing a pain medication agreement and the fact that we're going to run a state LUIS ALBERTO review to be sure the patient is not getting pain medication from elsewhere.  If this is the case we will not give pain medication.  As part of the patient's treatment plan of there being prescribed a controlled substance with potential for abuse.  This patient has been wait aware of the appropriate dose of such medications including, the risk for somnolence, limited ability to drive and/or safety and the significant potential for overdose.  It is been made clear that these medications are for the prescribed patient only without concomitant use of alcohol or other sepsis unless prescribed by the medical provider.  Has completed prescribing agreement detailing the terms of continue prescribing him a controlled substance.  Including monitoring Luis Alberto ports, the possibility of urine drug screens and pedal counts.  The patient is aware that we reviewed LUIS ALBERTO reports on a regular basis and scan them into the chart.  History and physical examination exhibited continued safe and appropriate use of controlled substances.  Also discussed the fact that the new [place] legislation allows only a three-day prescription for pain medication.  In this situation he will be referred to a chronic pain clinic.            Patient's Body mass index is 18.54 kg/m². BMI is within normal parameters. No follow-up required..              This document has been electronically signed by VERENICE FINK MD February 9, 2021 15:25 EST

## 2021-02-17 ENCOUNTER — HOSPITAL ENCOUNTER (EMERGENCY)
Facility: HOSPITAL | Age: 46
Discharge: HOME | End: 2021-02-17
Attending: FAMILY MEDICINE | Admitting: FAMILY MEDICINE

## 2021-02-17 ENCOUNTER — APPOINTMENT (OUTPATIENT)
Dept: CT IMAGING | Facility: HOSPITAL | Age: 46
End: 2021-02-17

## 2021-02-17 VITALS
DIASTOLIC BLOOD PRESSURE: 88 MMHG | SYSTOLIC BLOOD PRESSURE: 130 MMHG | TEMPERATURE: 97.5 F | HEIGHT: 68 IN | RESPIRATION RATE: 16 BRPM | WEIGHT: 123 LBS | OXYGEN SATURATION: 98 % | BODY MASS INDEX: 18.64 KG/M2 | HEART RATE: 78 BPM

## 2021-02-17 DIAGNOSIS — G43.009 MIGRAINE WITHOUT AURA AND WITHOUT STATUS MIGRAINOSUS, NOT INTRACTABLE: Primary | ICD-10-CM

## 2021-02-17 LAB
ALBUMIN SERPL-MCNC: 4.08 G/DL (ref 3.5–5.2)
ALBUMIN/GLOB SERPL: 1.7 G/DL
ALP SERPL-CCNC: 81 U/L (ref 39–117)
ALT SERPL W P-5'-P-CCNC: 35 U/L (ref 1–33)
ANION GAP SERPL CALCULATED.3IONS-SCNC: 6.8 MMOL/L (ref 5–15)
AST SERPL-CCNC: 30 U/L (ref 1–32)
BACTERIA UR QL AUTO: ABNORMAL /HPF
BASOPHILS # BLD AUTO: 0.01 10*3/MM3 (ref 0–0.2)
BASOPHILS NFR BLD AUTO: 0.2 % (ref 0–1.5)
BILIRUB SERPL-MCNC: 0.6 MG/DL (ref 0–1.2)
BILIRUB UR QL STRIP: NEGATIVE
BUN SERPL-MCNC: 7 MG/DL (ref 6–20)
BUN/CREAT SERPL: 14 (ref 7–25)
CALCIUM SPEC-SCNC: 9.2 MG/DL (ref 8.6–10.5)
CHLORIDE SERPL-SCNC: 106 MMOL/L (ref 98–107)
CLARITY UR: ABNORMAL
CO2 SERPL-SCNC: 27.2 MMOL/L (ref 22–29)
COLOR UR: ABNORMAL
CREAT SERPL-MCNC: 0.5 MG/DL (ref 0.57–1)
DEPRECATED RDW RBC AUTO: 43.9 FL (ref 37–54)
EOSINOPHIL # BLD AUTO: 0.09 10*3/MM3 (ref 0–0.4)
EOSINOPHIL NFR BLD AUTO: 2.1 % (ref 0.3–6.2)
ERYTHROCYTE [DISTWIDTH] IN BLOOD BY AUTOMATED COUNT: 12.9 % (ref 12.3–15.4)
GFR SERPL CREATININE-BSD FRML MDRD: 133 ML/MIN/1.73
GLOBULIN UR ELPH-MCNC: 2.4 GM/DL
GLUCOSE SERPL-MCNC: 91 MG/DL (ref 65–99)
GLUCOSE UR STRIP-MCNC: NEGATIVE MG/DL
HCT VFR BLD AUTO: 44.1 % (ref 34–46.6)
HGB BLD-MCNC: 14.6 G/DL (ref 12–15.9)
HGB UR QL STRIP.AUTO: ABNORMAL
HYALINE CASTS UR QL AUTO: ABNORMAL /LPF
IMM GRANULOCYTES # BLD AUTO: 0.01 10*3/MM3 (ref 0–0.05)
IMM GRANULOCYTES NFR BLD AUTO: 0.2 % (ref 0–0.5)
KETONES UR QL STRIP: NEGATIVE
LEUKOCYTE ESTERASE UR QL STRIP.AUTO: ABNORMAL
LYMPHOCYTES # BLD AUTO: 1.37 10*3/MM3 (ref 0.7–3.1)
LYMPHOCYTES NFR BLD AUTO: 32.5 % (ref 19.6–45.3)
MCH RBC QN AUTO: 30.6 PG (ref 26.6–33)
MCHC RBC AUTO-ENTMCNC: 33.1 G/DL (ref 31.5–35.7)
MCV RBC AUTO: 92.5 FL (ref 79–97)
MONOCYTES # BLD AUTO: 0.22 10*3/MM3 (ref 0.1–0.9)
MONOCYTES NFR BLD AUTO: 5.2 % (ref 5–12)
NEUTROPHILS NFR BLD AUTO: 2.51 10*3/MM3 (ref 1.7–7)
NEUTROPHILS NFR BLD AUTO: 59.8 % (ref 42.7–76)
NITRITE UR QL STRIP: NEGATIVE
NRBC BLD AUTO-RTO: 0 /100 WBC (ref 0–0.2)
PH UR STRIP.AUTO: 8.5 [PH] (ref 5–8)
PLATELET # BLD AUTO: 132 10*3/MM3 (ref 140–450)
PMV BLD AUTO: 10.6 FL (ref 6–12)
POTASSIUM SERPL-SCNC: 3.8 MMOL/L (ref 3.5–5.2)
PROT SERPL-MCNC: 6.5 G/DL (ref 6–8.5)
PROT UR QL STRIP: ABNORMAL
RBC # BLD AUTO: 4.77 10*6/MM3 (ref 3.77–5.28)
RBC # UR: ABNORMAL /HPF
REF LAB TEST METHOD: ABNORMAL
SODIUM SERPL-SCNC: 140 MMOL/L (ref 136–145)
SP GR UR STRIP: 1.02 (ref 1–1.03)
SQUAMOUS #/AREA URNS HPF: ABNORMAL /HPF
UROBILINOGEN UR QL STRIP: ABNORMAL
WBC # BLD AUTO: 4.21 10*3/MM3 (ref 3.4–10.8)
WBC UR QL AUTO: ABNORMAL /HPF

## 2021-02-17 PROCEDURE — 25010000002 BUTORPHANOL PER 1 MG: Performed by: FAMILY MEDICINE

## 2021-02-17 PROCEDURE — 96372 THER/PROPH/DIAG INJ SC/IM: CPT

## 2021-02-17 PROCEDURE — 70450 CT HEAD/BRAIN W/O DYE: CPT | Performed by: RADIOLOGY

## 2021-02-17 PROCEDURE — 96375 TX/PRO/DX INJ NEW DRUG ADDON: CPT

## 2021-02-17 PROCEDURE — 85025 COMPLETE CBC W/AUTO DIFF WBC: CPT | Performed by: FAMILY MEDICINE

## 2021-02-17 PROCEDURE — 96376 TX/PRO/DX INJ SAME DRUG ADON: CPT

## 2021-02-17 PROCEDURE — 80053 COMPREHEN METABOLIC PANEL: CPT | Performed by: FAMILY MEDICINE

## 2021-02-17 PROCEDURE — 25010000003 HEPARIN LOCK FLUSH PER 10 UNITS: Performed by: PHYSICIAN ASSISTANT

## 2021-02-17 PROCEDURE — 81001 URINALYSIS AUTO W/SCOPE: CPT | Performed by: FAMILY MEDICINE

## 2021-02-17 PROCEDURE — 70450 CT HEAD/BRAIN W/O DYE: CPT

## 2021-02-17 PROCEDURE — 99283 EMERGENCY DEPT VISIT LOW MDM: CPT

## 2021-02-17 PROCEDURE — 25010000002 DEXAMETHASONE PER 1 MG: Performed by: FAMILY MEDICINE

## 2021-02-17 PROCEDURE — 63710000001 PROMETHAZINE PER 25 MG: Performed by: FAMILY MEDICINE

## 2021-02-17 PROCEDURE — 96374 THER/PROPH/DIAG INJ IV PUSH: CPT

## 2021-02-17 RX ORDER — PROMETHAZINE HYDROCHLORIDE 25 MG/1
25 TABLET ORAL EVERY 8 HOURS PRN
Qty: 20 TABLET | Refills: 0 | OUTPATIENT
Start: 2021-02-17 | End: 2021-03-21

## 2021-02-17 RX ORDER — HEPARIN SODIUM (PORCINE) LOCK FLUSH IV SOLN 100 UNIT/ML 100 UNIT/ML
300 SOLUTION INTRAVENOUS ONCE
Status: COMPLETED | OUTPATIENT
Start: 2021-02-17 | End: 2021-02-17

## 2021-02-17 RX ORDER — BUTORPHANOL TARTRATE 1 MG/ML
1 INJECTION, SOLUTION INTRAMUSCULAR; INTRAVENOUS ONCE
Status: COMPLETED | OUTPATIENT
Start: 2021-02-17 | End: 2021-02-17

## 2021-02-17 RX ORDER — PROMETHAZINE HYDROCHLORIDE 25 MG/1
25 TABLET ORAL ONCE
Status: COMPLETED | OUTPATIENT
Start: 2021-02-17 | End: 2021-02-17

## 2021-02-17 RX ORDER — DEXAMETHASONE SODIUM PHOSPHATE 4 MG/ML
8 INJECTION, SOLUTION INTRA-ARTICULAR; INTRALESIONAL; INTRAMUSCULAR; INTRAVENOUS; SOFT TISSUE ONCE
Status: COMPLETED | OUTPATIENT
Start: 2021-02-17 | End: 2021-02-17

## 2021-02-17 RX ADMIN — BUTORPHANOL TARTRATE 1 MG: 1 INJECTION, SOLUTION INTRAMUSCULAR; INTRAVENOUS at 14:07

## 2021-02-17 RX ADMIN — DEXAMETHASONE SODIUM PHOSPHATE 8 MG: 4 INJECTION, SOLUTION INTRA-ARTICULAR; INTRALESIONAL; INTRAMUSCULAR; INTRAVENOUS; SOFT TISSUE at 14:07

## 2021-02-17 RX ADMIN — Medication 300 UNITS: at 15:40

## 2021-02-17 RX ADMIN — SODIUM CHLORIDE 1000 ML: 9 INJECTION, SOLUTION INTRAVENOUS at 14:21

## 2021-02-17 RX ADMIN — PROMETHAZINE HYDROCHLORIDE 25 MG: 25 TABLET ORAL at 14:07

## 2021-02-17 RX ADMIN — BUTORPHANOL TARTRATE 1 MG: 1 INJECTION, SOLUTION INTRAMUSCULAR; INTRAVENOUS at 15:11

## 2021-02-17 NOTE — ED PROVIDER NOTES
Subjective     History provided by:  Patient  Headache  Pain location:  Generalized  Quality:  Sharp and stabbing  Radiates to:  Eyes  Severity currently:  10/10  Severity at highest:  10/10  Onset quality:  Sudden  Duration:  6 days  Timing:  Constant  Progression:  Worsening  Chronicity:  Chronic  Similar to prior headaches: no (worse than normal)    Context: activity and bright light    Relieved by:  Nothing  Associated symptoms: nausea, syncope and vomiting    Associated symptoms: no abdominal pain and no fever        Review of Systems   Constitutional: Negative.  Negative for fever.   Respiratory: Negative.    Cardiovascular: Positive for syncope. Negative for chest pain.   Gastrointestinal: Positive for nausea and vomiting. Negative for abdominal pain.   Endocrine: Negative.    Genitourinary: Negative.  Negative for dysuria.   Skin: Negative.    Neurological: Positive for headaches.   Psychiatric/Behavioral: Negative.    All other systems reviewed and are negative.      Past Medical History:   Diagnosis Date   • Abdominal pain    • Abdominal swelling    • Hoyos's disease    • Bipolar 1 disorder (CMS/HCC)    • Brain tumor (benign) (CMS/HCC)    • Brain tumor (CMS/HCC)     R Frontal Lobe per pt   • Brain tumor (CMS/HCC) 2014   • Constipation    • DDD (degenerative disc disease), cervical 05/29/2017   • DDD (degenerative disc disease), cervical    • Diarrhea    • Fibromyalgia    • IBS (irritable bowel syndrome)    • Migraine    • Nausea & vomiting    • PONV (postoperative nausea and vomiting)    • PTSD (post-traumatic stress disorder)    • Rectal bleeding        Allergies   Allergen Reactions   • Ativan [Lorazepam] Hallucinations     confusion   • Sulfa Antibiotics Shortness Of Breath and Swelling   • Sulfa Antibiotics Anaphylaxis   • Reglan [Metoclopramide] Angioedema   • Compazine [Prochlorperazine Edisylate] Hives   • Demerol [Meperidine] Hives   • Droperidol Itching   • Metoclopramide Swelling   • Toradol  [Ketorolac Tromethamine] Hives and Itching   • Toradol [Ketorolac Tromethamine] Hives   • Zofran [Ondansetron Hcl] Rash   • Zosyn [Piperacillin Sod-Tazobactam So] Hives       Past Surgical History:   Procedure Laterality Date   • ANAL SCOPE N/A 7/28/2016    Procedure: ANAL SCOPE;  Surgeon: Kael Lopez MD;  Location: [facility] OR;  Service:    • APPENDECTOMY     • COLONOSCOPY N/A 6/30/2016    Procedure: COLONOSCOPY  CPTCODE:59091;  Surgeon: Jose Antonio Belle III, MD;  Location: [facility] OR;  Service:    • COLONOSCOPY N/A 7/7/2016    Procedure: COLONOSCOPY (25890) CPT;  Surgeon: Jose Antonio Belle III, MD;  Location: [facility] OR;  Service:    • CYSTOSCOPY RETROGRADE PYELOGRAM Bilateral 4/28/2017    Procedure: CYSTOSCOPY RETROGRADE PYELOGRAM;  Surgeon: Mu Blunt MD;  Location: [facility] OR;  Service:    • ENDOSCOPY N/A 6/30/2016    Procedure: ESOPHAGOGASTRODUODENOSCOPY WITH BIOPSY  CPTCODE:68666;  Surgeon: Jose Antonio Belle III, MD;  Location: [facility] OR;  Service:    • HEMORRHOIDECTOMY N/A 7/28/2016    Procedure: HEMORRHOID STAPLING;  Surgeon: Kael Lopez MD;  Location: [facility] OR;  Service:    • HYSTERECTOMY     • KNEE SURGERY     • LAPAROSCOPIC SALPINGOOPHERECTOMY     • PORTACATH PLACEMENT N/A 7/28/2017    Procedure: INSERTION OF PORTACATH;  Surgeon: Celso Arredondo MD;  Location: [facility] OR;  Service:    • SHOULDER SURGERY      3 times       Family History   Problem Relation Age of Onset   • Crohn's disease Other    • Hypertension Other    • Diabetes Other    • Irritable bowel syndrome Other    • No Known Problems Father    • No Known Problems Mother        Social History     Socioeconomic History   • Marital status:      Spouse name: Not on file   • Number of children: Not on file   • Years of education: Not on file   • Highest education level: Not on file   Tobacco Use   • Smoking status: Former Smoker     Packs/day: 1.00     Years: 20.00     Pack years: 20.00     Quit date: 11/1/2015      Years since quittin.3   • Smokeless tobacco: Never Used   Substance and Sexual Activity   • Alcohol use: No   • Drug use: Yes     Types: Marijuana     Comment: for pain   • Sexual activity: Defer     Birth control/protection: Surgical   Social History Narrative    ** Merged History Encounter **         Lives in [place] [employer], works as a , independent of [employer]           Objective   Physical Exam  Vitals signs and nursing note reviewed.   Constitutional:       General: She is not in acute distress.     Appearance: She is well-developed. She is not diaphoretic.   HENT:      Head: Normocephalic and atraumatic.      Right Ear: External ear normal.      Left Ear: External ear normal.      Nose: Nose normal.   Eyes:      Extraocular Movements: Extraocular movements intact.      Conjunctiva/sclera: Conjunctivae normal.      Pupils: Pupils are equal, round, and reactive to light.   Neck:      Musculoskeletal: Normal range of motion and neck supple.      Vascular: No JVD.      Trachea: No tracheal deviation.   Cardiovascular:      Rate and Rhythm: Normal rate and regular rhythm.      Heart sounds: No murmur.   Pulmonary:      Effort: Pulmonary effort is normal. No respiratory distress.      Breath sounds: No wheezing.   Abdominal:      Palpations: Abdomen is soft.      Tenderness: There is no abdominal tenderness.   Musculoskeletal: Normal range of motion.         General: No deformity.   Skin:     General: Skin is warm and dry.      Coloration: Skin is not pale.      Findings: No erythema or rash.   Neurological:      Mental Status: She is alert and oriented to person, place, and time.      Cranial Nerves: No cranial nerve deficit.   Psychiatric:         Behavior: Behavior normal.         Thought Content: Thought content normal.         Procedures           ED Course  ED Course as of  1508   Wed 2021   1442 CT head rad interpreted:   Stable exam demonstrating no CT evidence of acute  intracranial  abnormality.  2.  A right frontal region probable calcified meningioma also appears  stable in size compared to the previous exam.    [RB]      ED Course User Index  [RB] Hudson Suarez II, PA                                           MDM  Number of Diagnoses or Management Options  Migraine without aura and without status migrainosus, not intractable: new and requires workup     Amount and/or Complexity of Data Reviewed  Clinical lab tests: ordered and reviewed  Tests in the radiology section of CPT®: ordered and reviewed    Risk of Complications, Morbidity, and/or Mortality  Presenting problems: moderate  Diagnostic procedures: moderate  Management options: low    Patient Progress  Patient progress: stable      Final diagnoses:   Migraine without aura and without status migrainosus, not intractable            Hudson Suarez II, PA  02/17/21 1505

## 2021-03-15 ENCOUNTER — TELEPHONE (OUTPATIENT)
Dept: UROLOGY | Facility: CLINIC | Age: 46
End: 2021-03-15

## 2021-03-15 NOTE — TELEPHONE ENCOUNTER
She would like to come in today or tomorrow to have a dilation done.  Can we put her on the schedule?

## 2021-03-16 ENCOUNTER — BULK ORDERING (OUTPATIENT)
Dept: CASE MANAGEMENT | Facility: OTHER | Age: 46
End: 2021-03-16

## 2021-03-16 ENCOUNTER — OFFICE VISIT (OUTPATIENT)
Dept: UROLOGY | Facility: CLINIC | Age: 46
End: 2021-03-16

## 2021-03-16 VITALS — TEMPERATURE: 97.8 F | BODY MASS INDEX: 18.79 KG/M2 | HEIGHT: 68 IN | WEIGHT: 124 LBS

## 2021-03-16 DIAGNOSIS — N35.028 OTHER POST-TRAUMATIC URETHRAL STRICTURE, FEMALE: Primary | ICD-10-CM

## 2021-03-16 DIAGNOSIS — R31.0 GROSS HEMATURIA: ICD-10-CM

## 2021-03-16 DIAGNOSIS — Z23 IMMUNIZATION DUE: ICD-10-CM

## 2021-03-16 PROCEDURE — 53661 DILATION OF URETHRA: CPT | Performed by: UROLOGY

## 2021-03-16 RX ORDER — OXYCODONE AND ACETAMINOPHEN 10; 325 MG/1; MG/1
1 TABLET ORAL EVERY 6 HOURS PRN
Qty: 8 TABLET | Refills: 0 | Status: SHIPPED | OUTPATIENT
Start: 2021-03-16 | End: 2021-04-16 | Stop reason: SDUPTHER

## 2021-03-16 NOTE — PROGRESS NOTES
Chief Complaint:          Chief Complaint   Patient presents with   • Dilation     follow up        HPI:   46 y.o. female returns today she has a severe postprocedural urethral stricture in the distal third quite tight requiring anesthesia locally and dilation she reports no fevers chills blood in the urine etc. no other complaints or problems.      Past Medical History:        Past Medical History:   Diagnosis Date   • Abdominal pain    • Abdominal swelling    • Hoyos's disease    • Bipolar 1 disorder (CMS/HCC)    • Brain tumor (benign) (CMS/HCC)    • Brain tumor (CMS/HCC)     R Frontal Lobe per pt   • Brain tumor (CMS/HCC) 2014   • Constipation    • DDD (degenerative disc disease), cervical 05/29/2017   • DDD (degenerative disc disease), cervical    • Diarrhea    • Fibromyalgia    • IBS (irritable bowel syndrome)    • Migraine    • Nausea & vomiting    • PONV (postoperative nausea and vomiting)    • PTSD (post-traumatic stress disorder)    • Rectal bleeding          Current Meds:     Current Outpatient Medications   Medication Sig Dispense Refill   • acetaminophen-codeine (TYLENOL #3) 300-30 MG per tablet Take 1 tablet by mouth Every 6 (Six) Hours As Needed for Moderate Pain . 12 tablet 0   • albuterol (PROVENTIL HFA;VENTOLIN HFA) 108 (90 Base) MCG/ACT inhaler Inhale 2 puffs 2 (Two) Times a Day.     • Azelastine HCl 137 MCG/SPRAY solution 1 spray into each nostril As Needed (Allergies).     • busPIRone (BUSPAR) 15 MG tablet Take 15 mg by mouth 3 (three) times a day        • cefuroxime (CEFTIN) 500 MG tablet      • cetirizine (zyrTEC) 10 MG tablet Take 1 tablet by mouth Daily. 30 tablet 0   • cyclobenzaprine (FLEXERIL) 5 MG tablet      • diclofenac (VOLTAREN) 1 % gel gel      • divalproex (DEPAKOTE) 500 MG DR tablet Take 1 tablet by mouth 3 (Three) Times a Day. 90 tablet 2   • fluticasone (VERAMYST) 27.5 MCG/SPRAY nasal spray 2 sprays into each nostril Daily.     • montelukast (SINGULAIR) 10 MG tablet Take 10 mg  by mouth Every Night.     • ondansetron (ZOFRAN) 4 MG tablet      • orphenadrine (NORFLEX) 100 MG 12 hr tablet Take 1 tablet by mouth 2 (Two) Times a Day. 20 tablet 0   • oxyCODONE (ROXICODONE) 5 MG immediate release tablet      • oxyCODONE-acetaminophen (Percocet)  MG per tablet Take 1 tablet by mouth Every 6 (Six) Hours As Needed for Moderate Pain . 8 tablet 0   • pantoprazole (PROTONIX) 40 MG EC tablet Take 40 mg by mouth 2 (Two) Times a Day As Needed.     • phenazopyridine (PYRIDIUM) 100 MG tablet      • polyethylene glycol (MIRALAX) powder Take 17 g by mouth Daily. 289 g 0   • promethazine (PHENERGAN) 25 MG tablet Take 1 tablet by mouth Every 8 (Eight) Hours As Needed for Nausea or Vomiting. 20 tablet 0   • sertraline (ZOLOFT) 100 MG tablet Take 100 mg by mouth 2 (Two) Times a Day.     • SUMAtriptan (IMITREX) 6 MG/0.5ML solution injection Inject 6 mg under the skin 1 (One) Time.     • tiZANidine (ZANAFLEX) 4 MG tablet      • traMADol (ULTRAM) 50 MG tablet        No current facility-administered medications for this visit.        Allergies:      Allergies   Allergen Reactions   • Ativan [Lorazepam] Hallucinations     confusion   • Sulfa Antibiotics Shortness Of Breath and Swelling   • Sulfa Antibiotics Anaphylaxis   • Reglan [Metoclopramide] Angioedema   • Compazine [Prochlorperazine Edisylate] Hives   • Demerol [Meperidine] Hives   • Droperidol Itching   • Metoclopramide Swelling   • Toradol [Ketorolac Tromethamine] Hives and Itching   • Toradol [Ketorolac Tromethamine] Hives   • Zofran [Ondansetron Hcl] Rash   • Zosyn [Piperacillin Sod-Tazobactam So] Hives        Past Surgical History:     Past Surgical History:   Procedure Laterality Date   • ANAL SCOPE N/A 7/28/2016    Procedure: ANAL SCOPE;  Surgeon: Kael Lopez MD;  Location: [facility] OR;  Service:    • APPENDECTOMY     • COLONOSCOPY N/A 6/30/2016    Procedure: COLONOSCOPY  CPTCODE:55076;  Surgeon: Jose Antonio Belle III, MD;  Location: [facility] OR;   Service:    • COLONOSCOPY N/A 2016    Procedure: COLONOSCOPY (53610) CPT;  Surgeon: Jose Antonio Belle III, MD;  Location: [facility] OR;  Service:    • CYSTOSCOPY RETROGRADE PYELOGRAM Bilateral 2017    Procedure: CYSTOSCOPY RETROGRADE PYELOGRAM;  Surgeon: Mu Blunt MD;  Location: [facility] OR;  Service:    • ENDOSCOPY N/A 2016    Procedure: ESOPHAGOGASTRODUODENOSCOPY WITH BIOPSY  CPTCODE:29211;  Surgeon: Jose Antonio Belle III, MD;  Location: [facility] OR;  Service:    • HEMORRHOIDECTOMY N/A 2016    Procedure: HEMORRHOID STAPLING;  Surgeon: Kael Lopez MD;  Location: [facility] OR;  Service:    • HYSTERECTOMY     • KNEE SURGERY     • LAPAROSCOPIC SALPINGOOPHERECTOMY     • PORTACATH PLACEMENT N/A 2017    Procedure: INSERTION OF PORTACATH;  Surgeon: Celso Arredondo MD;  Location: [facility] OR;  Service:    • SHOULDER SURGERY      3 times         Social History:     Social History     Socioeconomic History   • Marital status:      Spouse name: Not on file   • Number of children: Not on file   • Years of education: Not on file   • Highest education level: Not on file   Tobacco Use   • Smoking status: Former Smoker     Packs/day: 1.00     Years: 20.00     Pack years: 20.00     Quit date: 2015     Years since quittin.3   • Smokeless tobacco: Never Used   Substance and Sexual Activity   • Alcohol use: No   • Drug use: Yes     Types: Marijuana     Comment: for pain   • Sexual activity: Defer     Birth control/protection: Surgical       Family History:     Family History   Problem Relation Age of Onset   • Crohn's disease Other    • Hypertension Other    • Diabetes Other    • Irritable bowel syndrome Other    • No Known Problems Father    • No Known Problems Mother        Review of Systems:     Review of Systems   Constitutional: Negative.  Negative for activity change, appetite change, chills, diaphoresis, fatigue and unexpected weight change.   HENT: Negative for congestion,  dental problem, drooling, ear discharge, ear pain, facial swelling, hearing loss, mouth sores, nosebleeds, postnasal drip, rhinorrhea, sinus pressure, sneezing, sore throat, tinnitus, trouble swallowing and voice change.    Eyes: Negative.  Negative for photophobia, pain, discharge, redness, itching and visual disturbance.   Respiratory: Negative.  Negative for apnea, cough, choking, chest tightness, shortness of breath, wheezing and stridor.    Cardiovascular: Negative.  Negative for chest pain, palpitations and leg swelling.   Gastrointestinal: Negative.  Negative for abdominal distention, abdominal pain, anal bleeding, blood in stool, constipation, diarrhea, nausea, rectal pain and vomiting.   Endocrine: Negative.  Negative for cold intolerance, heat intolerance, polydipsia, polyphagia and polyuria.   Genitourinary: Positive for difficulty urinating.   Musculoskeletal: Negative.  Negative for arthralgias, back pain, gait problem, joint swelling, myalgias, neck pain and neck stiffness.   Skin: Negative.  Negative for color change, pallor, rash and wound.   Allergic/Immunologic: Negative.  Negative for environmental allergies, food allergies and immunocompromised state.   Neurological: Negative.  Negative for dizziness, tremors, seizures, syncope, facial asymmetry, speech difficulty, weakness, light-headedness, numbness and headaches.   Hematological: Negative.  Negative for adenopathy. Does not bruise/bleed easily.   Psychiatric/Behavioral: Negative for agitation, behavioral problems, confusion, decreased concentration, dysphoric mood, hallucinations, self-injury, sleep disturbance and suicidal ideas. The patient is not nervous/anxious and is not hyperactive.    All other systems reviewed and are negative.      Physical Exam:     Physical Exam  Constitutional:       Appearance: She is well-developed.   HENT:      Head: Normocephalic and atraumatic.      Right Ear: External ear normal.      Left Ear: External ear  normal.   Eyes:      Conjunctiva/sclera: Conjunctivae normal.      Pupils: Pupils are equal, round, and reactive to light.   Cardiovascular:      Rate and Rhythm: Normal rate and regular rhythm.      Heart sounds: Normal heart sounds.   Pulmonary:      Effort: Pulmonary effort is normal.      Breath sounds: Normal breath sounds.   Abdominal:      General: Bowel sounds are normal. There is no distension.      Palpations: Abdomen is soft. There is no mass.      Tenderness: There is no abdominal tenderness. There is no guarding or rebound.   Genitourinary:     General: Normal vulva.      Vagina: No vaginal discharge.   Musculoskeletal:         General: Normal range of motion.   Skin:     General: Skin is warm and dry.   Neurological:      Mental Status: She is alert.      Deep Tendon Reflexes: Reflexes are normal and symmetric.   Psychiatric:         Behavior: Behavior normal.         Thought Content: Thought content normal.         Judgment: Judgment normal.         I have reviewed the following portions of the patient's history: allergies, current medications, past family history, past medical history, past social history, past surgical history, problem list and ROS and confirm it's accurate.      Procedure:   Urethral dilation-after an appropriate informed consent the patient was brought to the procedure suite.  The urethra was gently anesthetized with 10 cc of 2% viscous Xylocaine jelly.  After an adequate period of topical anesthesia I went ahead and dilated with [place] sounds from 16-30 Urdu sequentially without complication the patient was given gentamicin as prophylaxis with 80 mg    Assessment/Plan:   Postprocedural urethral stricture status post dilation  Narcotic pain medication-patient has significant acute pain that I believe would be an indication for the use of narcotic pain medication.  I discussed the significant risks of pain medication and the fact that this will be a short only option and I  will give her no more than a three-day supply of pain medication.  And I will not plan long-term medication and that this will be sent to a pain clinic if at all becomes necessary.  We discussed signing a pain medication agreement and the fact that we're going to run a state [place] review to be sure the patient is not getting pain medication from elsewhere.  If this is the case we will not give pain medication.  As part of the patient's treatment plan of there being prescribed a controlled substance with potential for abuse.  This patient has been wait aware of the appropriate dose of such medications including, the risk for somnolence, limited ability to drive and/or safety and the significant potential for overdose.  It is been made clear that these medications are for the prescribed patient only without concomitant use of alcohol or other sepsis unless prescribed by the medical provider.  Has completed prescribing agreement detailing the terms of continue prescribing him a controlled substance.  Including monitoring Luis Alberto ports, the possibility of urine drug screens and pedal counts.  The patient is aware that we reviewed LUIS ALBERTO reports on a regular basis and scan them into the chart.  History and physical examination exhibited continued safe and appropriate use of controlled substances.  Also discussed the fact that the new [place] legislation allows only a three-day prescription for pain medication.  In this situation he will be referred to a chronic pain clinic.  She gets a basic amount of pain medication at home as part of her pain clinic however with the procedure its been okayed by her pain clinic to have 6 additional oxycodones            Patient's Body mass index is 18.85 kg/m². BMI is within normal parameters. No follow-up required..              This document has been electronically signed by VERENICE FINK MD March 16, 2021 11:14 EDT

## 2021-03-21 ENCOUNTER — HOSPITAL ENCOUNTER (EMERGENCY)
Facility: HOSPITAL | Age: 46
Discharge: HOME | End: 2021-03-21
Attending: STUDENT IN AN ORGANIZED HEALTH CARE EDUCATION/TRAINING PROGRAM | Admitting: STUDENT IN AN ORGANIZED HEALTH CARE EDUCATION/TRAINING PROGRAM

## 2021-03-21 VITALS
TEMPERATURE: 97.5 F | BODY MASS INDEX: 19.4 KG/M2 | RESPIRATION RATE: 18 BRPM | SYSTOLIC BLOOD PRESSURE: 121 MMHG | DIASTOLIC BLOOD PRESSURE: 83 MMHG | HEART RATE: 87 BPM | OXYGEN SATURATION: 99 % | HEIGHT: 68 IN | WEIGHT: 128 LBS

## 2021-03-21 DIAGNOSIS — G43.001 MIGRAINE WITHOUT AURA AND WITH STATUS MIGRAINOSUS, NOT INTRACTABLE: Primary | ICD-10-CM

## 2021-03-21 LAB
ALBUMIN SERPL-MCNC: 3.89 G/DL (ref 3.5–5.2)
ALBUMIN/GLOB SERPL: 1.8 G/DL
ALP SERPL-CCNC: 73 U/L (ref 39–117)
ALT SERPL W P-5'-P-CCNC: 34 U/L (ref 1–33)
ANION GAP SERPL CALCULATED.3IONS-SCNC: 8.6 MMOL/L (ref 5–15)
AST SERPL-CCNC: 26 U/L (ref 1–32)
BASOPHILS # BLD AUTO: 0.01 10*3/MM3 (ref 0–0.2)
BASOPHILS NFR BLD AUTO: 0.1 % (ref 0–1.5)
BILIRUB SERPL-MCNC: 0.5 MG/DL (ref 0–1.2)
BUN SERPL-MCNC: 8 MG/DL (ref 6–20)
BUN/CREAT SERPL: 16.3 (ref 7–25)
CALCIUM SPEC-SCNC: 9.1 MG/DL (ref 8.6–10.5)
CHLORIDE SERPL-SCNC: 109 MMOL/L (ref 98–107)
CO2 SERPL-SCNC: 27.4 MMOL/L (ref 22–29)
CREAT SERPL-MCNC: 0.49 MG/DL (ref 0.57–1)
DEPRECATED RDW RBC AUTO: 43.8 FL (ref 37–54)
EOSINOPHIL # BLD AUTO: 0.03 10*3/MM3 (ref 0–0.4)
EOSINOPHIL NFR BLD AUTO: 0.3 % (ref 0.3–6.2)
ERYTHROCYTE [DISTWIDTH] IN BLOOD BY AUTOMATED COUNT: 13 % (ref 12.3–15.4)
GFR SERPL CREATININE-BSD FRML MDRD: 136 ML/MIN/1.73
GLOBULIN UR ELPH-MCNC: 2.2 GM/DL
GLUCOSE SERPL-MCNC: 99 MG/DL (ref 65–99)
HCT VFR BLD AUTO: 41.5 % (ref 34–46.6)
HGB BLD-MCNC: 13.9 G/DL (ref 12–15.9)
IMM GRANULOCYTES # BLD AUTO: 0.02 10*3/MM3 (ref 0–0.05)
IMM GRANULOCYTES NFR BLD AUTO: 0.2 % (ref 0–0.5)
LYMPHOCYTES # BLD AUTO: 2.35 10*3/MM3 (ref 0.7–3.1)
LYMPHOCYTES NFR BLD AUTO: 24.2 % (ref 19.6–45.3)
MCH RBC QN AUTO: 30.7 PG (ref 26.6–33)
MCHC RBC AUTO-ENTMCNC: 33.5 G/DL (ref 31.5–35.7)
MCV RBC AUTO: 91.6 FL (ref 79–97)
MONOCYTES # BLD AUTO: 0.47 10*3/MM3 (ref 0.1–0.9)
MONOCYTES NFR BLD AUTO: 4.8 % (ref 5–12)
NEUTROPHILS NFR BLD AUTO: 6.85 10*3/MM3 (ref 1.7–7)
NEUTROPHILS NFR BLD AUTO: 70.4 % (ref 42.7–76)
NRBC BLD AUTO-RTO: 0 /100 WBC (ref 0–0.2)
PLATELET # BLD AUTO: 138 10*3/MM3 (ref 140–450)
PMV BLD AUTO: 10.2 FL (ref 6–12)
POTASSIUM SERPL-SCNC: 3.8 MMOL/L (ref 3.5–5.2)
PROT SERPL-MCNC: 6.1 G/DL (ref 6–8.5)
RBC # BLD AUTO: 4.53 10*6/MM3 (ref 3.77–5.28)
SODIUM SERPL-SCNC: 145 MMOL/L (ref 136–145)
WBC # BLD AUTO: 9.73 10*3/MM3 (ref 3.4–10.8)

## 2021-03-21 PROCEDURE — 25010000002 ONDANSETRON PER 1 MG: Performed by: PHYSICIAN ASSISTANT

## 2021-03-21 PROCEDURE — 96374 THER/PROPH/DIAG INJ IV PUSH: CPT

## 2021-03-21 PROCEDURE — 80053 COMPREHEN METABOLIC PANEL: CPT | Performed by: PHYSICIAN ASSISTANT

## 2021-03-21 PROCEDURE — 85025 COMPLETE CBC W/AUTO DIFF WBC: CPT | Performed by: PHYSICIAN ASSISTANT

## 2021-03-21 PROCEDURE — 25010000002 DEXAMETHASONE PER 1 MG: Performed by: PHYSICIAN ASSISTANT

## 2021-03-21 PROCEDURE — 99283 EMERGENCY DEPT VISIT LOW MDM: CPT

## 2021-03-21 PROCEDURE — 96375 TX/PRO/DX INJ NEW DRUG ADDON: CPT

## 2021-03-21 PROCEDURE — 25010000002 BUTORPHANOL PER 1 MG: Performed by: STUDENT IN AN ORGANIZED HEALTH CARE EDUCATION/TRAINING PROGRAM

## 2021-03-21 RX ORDER — DEXAMETHASONE SODIUM PHOSPHATE 4 MG/ML
4 INJECTION, SOLUTION INTRA-ARTICULAR; INTRALESIONAL; INTRAMUSCULAR; INTRAVENOUS; SOFT TISSUE ONCE
Status: COMPLETED | OUTPATIENT
Start: 2021-03-21 | End: 2021-03-21

## 2021-03-21 RX ORDER — PROMETHAZINE HYDROCHLORIDE 12.5 MG/1
12.5 TABLET ORAL EVERY 6 HOURS PRN
Qty: 12 TABLET | Refills: 0 | OUTPATIENT
Start: 2021-03-21 | End: 2021-06-20

## 2021-03-21 RX ORDER — ONDANSETRON 2 MG/ML
4 INJECTION INTRAMUSCULAR; INTRAVENOUS ONCE
Status: COMPLETED | OUTPATIENT
Start: 2021-03-21 | End: 2021-03-21

## 2021-03-21 RX ORDER — SODIUM CHLORIDE 0.9 % (FLUSH) 0.9 %
10 SYRINGE (ML) INJECTION AS NEEDED
Status: DISCONTINUED | OUTPATIENT
Start: 2021-03-21 | End: 2021-03-21 | Stop reason: HOSPADM

## 2021-03-21 RX ADMIN — ONDANSETRON 4 MG: 2 INJECTION INTRAMUSCULAR; INTRAVENOUS at 13:01

## 2021-03-21 RX ADMIN — DEXAMETHASONE SODIUM PHOSPHATE 4 MG: 4 INJECTION, SOLUTION INTRA-ARTICULAR; INTRALESIONAL; INTRAMUSCULAR; INTRAVENOUS; SOFT TISSUE at 13:01

## 2021-03-21 RX ADMIN — BUTORPHANOL TARTRATE 2 MG: 2 INJECTION, SOLUTION INTRAMUSCULAR; INTRAVENOUS at 12:33

## 2021-03-21 RX ADMIN — SODIUM CHLORIDE 1000 ML: 9 INJECTION, SOLUTION INTRAVENOUS at 12:33

## 2021-03-21 NOTE — ED PROVIDER NOTES
Subjective   Patient complains of a headache for several days.  States that none of her medications at home are helping is also having some nausea and vomiting.  Has a history of migraines, no change in her headache at this time.      History provided by:  Patient   used: No    Headache  Pain location:  Frontal  Quality:  Dull  Radiates to:  Does not radiate  Severity currently:  8/10  Severity at highest:  8/10  Onset quality:  Sudden  Duration:  4 days  Timing:  Constant  Progression:  Worsening  Chronicity:  New  Similar to prior headaches: yes    Context: activity and bright light    Relieved by:  Nothing  Worsened by:  Activity and light  Ineffective treatments:  None tried  Associated symptoms: nausea and vomiting    Associated symptoms: no congestion, no cough, no diarrhea, no ear pain, no fever and no sore throat        Review of Systems   Constitutional: Negative for chills and fever.   HENT: Negative for congestion, ear pain and sore throat.    Respiratory: Negative for cough, shortness of breath and wheezing.    Cardiovascular: Negative for chest pain.   Gastrointestinal: Positive for nausea and vomiting. Negative for diarrhea.   Genitourinary: Negative for dysuria and flank pain.   Skin: Negative for rash.   Neurological: Positive for headaches.   Psychiatric/Behavioral: The patient is not nervous/anxious.    All other systems reviewed and are negative.      Past Medical History:   Diagnosis Date   • Abdominal pain    • Abdominal swelling    • Hoyos's disease    • Bipolar 1 disorder (CMS/HCC)    • Brain tumor (benign) (CMS/HCC)    • Brain tumor (CMS/HCC)     R Frontal Lobe per pt   • Brain tumor (CMS/HCC) 2014   • Constipation    • DDD (degenerative disc disease), cervical 05/29/2017   • DDD (degenerative disc disease), cervical    • Diarrhea    • Fibromyalgia    • IBS (irritable bowel syndrome)    • Migraine    • Nausea & vomiting    • PONV (postoperative nausea and vomiting)    •  PTSD (post-traumatic stress disorder)    • Rectal bleeding        Allergies   Allergen Reactions   • Ativan [Lorazepam] Hallucinations     confusion   • Sulfa Antibiotics Shortness Of Breath and Swelling   • Sulfa Antibiotics Anaphylaxis   • Reglan [Metoclopramide] Angioedema   • Compazine [Prochlorperazine Edisylate] Hives   • Demerol [Meperidine] Hives   • Droperidol Itching   • Metoclopramide Swelling   • Toradol [Ketorolac Tromethamine] Hives and Itching   • Toradol [Ketorolac Tromethamine] Hives   • Zofran [Ondansetron Hcl] Rash   • Zosyn [Piperacillin Sod-Tazobactam So] Hives       Past Surgical History:   Procedure Laterality Date   • ANAL SCOPE N/A 7/28/2016    Procedure: ANAL SCOPE;  Surgeon: Kael Lopez MD;  Location: [facility] OR;  Service:    • APPENDECTOMY     • COLONOSCOPY N/A 6/30/2016    Procedure: COLONOSCOPY  CPTCODE:94293;  Surgeon: Jose Antonio Belle III, MD;  Location: [facility] OR;  Service:    • COLONOSCOPY N/A 7/7/2016    Procedure: COLONOSCOPY (76008) CPT;  Surgeon: Jose Antonio Belle III, MD;  Location: [facility] OR;  Service:    • CYSTOSCOPY RETROGRADE PYELOGRAM Bilateral 4/28/2017    Procedure: CYSTOSCOPY RETROGRADE PYELOGRAM;  Surgeon: Mu Blutn MD;  Location: [facility] OR;  Service:    • ENDOSCOPY N/A 6/30/2016    Procedure: ESOPHAGOGASTRODUODENOSCOPY WITH BIOPSY  CPTCODE:32356;  Surgeon: Jose Antonio Belle III, MD;  Location: [facility] OR;  Service:    • HEMORRHOIDECTOMY N/A 7/28/2016    Procedure: HEMORRHOID STAPLING;  Surgeon: Kael Lopez MD;  Location: [facility] OR;  Service:    • HYSTERECTOMY     • KNEE SURGERY     • LAPAROSCOPIC SALPINGOOPHERECTOMY     • PORTACATH PLACEMENT N/A 7/28/2017    Procedure: INSERTION OF PORTACATH;  Surgeon: Celso Arredondo MD;  Location: [facility] OR;  Service:    • SHOULDER SURGERY      3 times       Family History   Problem Relation Age of Onset   • Crohn's disease Other    • Hypertension Other    • Diabetes Other    • Irritable bowel syndrome  Other    • No Known Problems Father    • No Known Problems Mother        Social History     Socioeconomic History   • Marital status:      Spouse name: Not on file   • Number of children: Not on file   • Years of education: Not on file   • Highest education level: Not on file   Tobacco Use   • Smoking status: Former Smoker     Packs/day: 1.00     Years: 20.00     Pack years: 20.00     Quit date: 2015     Years since quittin.3   • Smokeless tobacco: Never Used   Substance and Sexual Activity   • Alcohol use: No   • Drug use: Yes     Types: Marijuana     Comment: for pain   • Sexual activity: Defer     Birth control/protection: Surgical           Objective   Physical Exam  Vitals and nursing note reviewed.   Constitutional:       Appearance: She is well-developed.   HENT:      Head: Normocephalic.   Cardiovascular:      Rate and Rhythm: Normal rate and regular rhythm.   Pulmonary:      Effort: Pulmonary effort is normal.      Breath sounds: Normal breath sounds.   Abdominal:      General: Bowel sounds are normal.      Palpations: Abdomen is soft.      Tenderness: There is no abdominal tenderness.   Musculoskeletal:         General: Normal range of motion.      Cervical back: Neck supple.   Skin:     General: Skin is warm and dry.   Neurological:      Mental Status: She is alert and oriented to person, place, and time.   Psychiatric:         Behavior: Behavior normal.         Thought Content: Thought content normal.         Judgment: Judgment normal.         Procedures           ED Course                                           MDM    Final diagnoses:   Migraine without aura and with status migrainosus, not intractable       ED Disposition  ED Disposition     ED Disposition Condition Comment    Discharge Stable           Mabel Patterson, APRN  [id number]  [place] [address]  [phone]    In 2 days           Medication List      Changed    promethazine 12.5 MG tablet  Commonly known as:  PHENERGAN  Take 1 tablet by mouth Every 6 (Six) Hours As Needed for Nausea or Vomiting.  What changed:   · medication strength  · how much to take  · when to take this           Where to Get Your Medications      You can get these medications from any pharmacy    Bring a paper prescription for each of these medications  · promethazine 12.5 MG tablet          Xenia Garza PA  03/22/21 1024

## 2021-03-30 ENCOUNTER — TELEPHONE (OUTPATIENT)
Dept: UROLOGY | Facility: CLINIC | Age: 46
End: 2021-03-30

## 2021-03-30 NOTE — TELEPHONE ENCOUNTER
I called the pt to let her know that I orderd 4 times a day and I have left a message with the catheter rep to check into this for me.     Steph from  [employer] contacted me and said that she would reach out to the pt and rectify the situation.

## 2021-04-16 ENCOUNTER — OFFICE VISIT (OUTPATIENT)
Dept: UROLOGY | Facility: CLINIC | Age: 46
End: 2021-04-16

## 2021-04-16 VITALS — WEIGHT: 129 LBS | HEIGHT: 68 IN | TEMPERATURE: 97.1 F | BODY MASS INDEX: 19.55 KG/M2

## 2021-04-16 DIAGNOSIS — R31.0 GROSS HEMATURIA: ICD-10-CM

## 2021-04-16 DIAGNOSIS — N35.028 OTHER POST-TRAUMATIC URETHRAL STRICTURE, FEMALE: Primary | ICD-10-CM

## 2021-04-16 PROCEDURE — 96372 THER/PROPH/DIAG INJ SC/IM: CPT | Performed by: UROLOGY

## 2021-04-16 PROCEDURE — 53661 DILATION OF URETHRA: CPT | Performed by: UROLOGY

## 2021-04-16 RX ORDER — OXYCODONE AND ACETAMINOPHEN 10; 325 MG/1; MG/1
1 TABLET ORAL EVERY 6 HOURS PRN
Qty: 8 TABLET | Refills: 0 | Status: SHIPPED | OUTPATIENT
Start: 2021-04-16 | End: 2021-05-27 | Stop reason: SDUPTHER

## 2021-04-16 RX ORDER — GENTAMICIN SULFATE 40 MG/ML
80 INJECTION, SOLUTION INTRAMUSCULAR; INTRAVENOUS ONCE
Status: COMPLETED | OUTPATIENT
Start: 2021-04-16 | End: 2021-04-16

## 2021-04-16 RX ADMIN — GENTAMICIN SULFATE 80 MG: 40 INJECTION, SOLUTION INTRAMUSCULAR; INTRAVENOUS at 14:45

## 2021-04-16 NOTE — PROGRESS NOTES
Chief Complaint:          Chief Complaint   Patient presents with   • stricture     follow up        HPI:   46 y.o. female returns today well-known to me with severe urethral stricture disease for dilation.      Past Medical History:        Past Medical History:   Diagnosis Date   • Abdominal pain    • Abdominal swelling    • Hoyos's disease    • Bipolar 1 disorder (CMS/HCC)    • Brain tumor (benign) (CMS/HCC)    • Brain tumor (CMS/HCC)     R Frontal Lobe per pt   • Brain tumor (CMS/HCC) 2014   • Constipation    • DDD (degenerative disc disease), cervical 05/29/2017   • DDD (degenerative disc disease), cervical    • Diarrhea    • Fibromyalgia    • IBS (irritable bowel syndrome)    • Migraine    • Nausea & vomiting    • PONV (postoperative nausea and vomiting)    • PTSD (post-traumatic stress disorder)    • Rectal bleeding          Current Meds:     Current Outpatient Medications   Medication Sig Dispense Refill   • acetaminophen-codeine (TYLENOL #3) 300-30 MG per tablet Take 1 tablet by mouth Every 6 (Six) Hours As Needed for Moderate Pain . 12 tablet 0   • albuterol (PROVENTIL HFA;VENTOLIN HFA) 108 (90 Base) MCG/ACT inhaler Inhale 2 puffs 2 (Two) Times a Day.     • Azelastine HCl 137 MCG/SPRAY solution 1 spray into each nostril As Needed (Allergies).     • busPIRone (BUSPAR) 15 MG tablet Take 15 mg by mouth 3 (three) times a day        • cefuroxime (CEFTIN) 500 MG tablet      • cetirizine (zyrTEC) 10 MG tablet Take 1 tablet by mouth Daily. 30 tablet 0   • cyclobenzaprine (FLEXERIL) 5 MG tablet      • diclofenac (VOLTAREN) 1 % gel gel      • divalproex (DEPAKOTE) 500 MG DR tablet Take 1 tablet by mouth 3 (Three) Times a Day. 90 tablet 2   • fluticasone (VERAMYST) 27.5 MCG/SPRAY nasal spray 2 sprays into each nostril Daily.     • montelukast (SINGULAIR) 10 MG tablet Take 10 mg by mouth Every Night.     • ondansetron (ZOFRAN) 4 MG tablet      • orphenadrine (NORFLEX) 100 MG 12 hr tablet Take 1 tablet by mouth 2 (Two)  Times a Day. 20 tablet 0   • oxyCODONE (ROXICODONE) 5 MG immediate release tablet      • oxyCODONE-acetaminophen (Percocet)  MG per tablet Take 1 tablet by mouth Every 6 (Six) Hours As Needed for Moderate Pain . 8 tablet 0   • pantoprazole (PROTONIX) 40 MG EC tablet Take 40 mg by mouth 2 (Two) Times a Day As Needed.     • phenazopyridine (PYRIDIUM) 100 MG tablet      • polyethylene glycol (MIRALAX) powder Take 17 g by mouth Daily. 289 g 0   • promethazine (PHENERGAN) 12.5 MG tablet Take 1 tablet by mouth Every 6 (Six) Hours As Needed for Nausea or Vomiting. 12 tablet 0   • sertraline (ZOLOFT) 100 MG tablet Take 100 mg by mouth 2 (Two) Times a Day.     • SUMAtriptan (IMITREX) 6 MG/0.5ML solution injection Inject 6 mg under the skin 1 (One) Time.     • tiZANidine (ZANAFLEX) 4 MG tablet      • traMADol (ULTRAM) 50 MG tablet        No current facility-administered medications for this visit.        Allergies:      Allergies   Allergen Reactions   • Ativan [Lorazepam] Hallucinations     confusion   • Sulfa Antibiotics Shortness Of Breath and Swelling   • Sulfa Antibiotics Anaphylaxis   • Reglan [Metoclopramide] Angioedema   • Compazine [Prochlorperazine Edisylate] Hives   • Demerol [Meperidine] Hives   • Droperidol Itching   • Metoclopramide Swelling   • Toradol [Ketorolac Tromethamine] Hives and Itching   • Toradol [Ketorolac Tromethamine] Hives   • Zofran [Ondansetron Hcl] Rash   • Zosyn [Piperacillin Sod-Tazobactam So] Hives        Past Surgical History:     Past Surgical History:   Procedure Laterality Date   • ANAL SCOPE N/A 7/28/2016    Procedure: ANAL SCOPE;  Surgeon: Kael Lopez MD;  Location: [facility] OR;  Service:    • APPENDECTOMY     • COLONOSCOPY N/A 6/30/2016    Procedure: COLONOSCOPY  CPTCODE:30592;  Surgeon: Jose Antonio Belle III, MD;  Location: [facility] OR;  Service:    • COLONOSCOPY N/A 7/7/2016    Procedure: COLONOSCOPY (73158) CPT;  Surgeon: Jose Antonio Belle III, MD;  Location: [facility] OR;   Service:    • CYSTOSCOPY RETROGRADE PYELOGRAM Bilateral 2017    Procedure: CYSTOSCOPY RETROGRADE PYELOGRAM;  Surgeon: Mu Blunt MD;  Location: [facility];  Service:    • ENDOSCOPY N/A 2016    Procedure: ESOPHAGOGASTRODUODENOSCOPY WITH BIOPSY  CPTCODE:07128;  Surgeon: Jose Antonio Belle III, MD;  Location: [facility];  Service:    • HEMORRHOIDECTOMY N/A 2016    Procedure: HEMORRHOID STAPLING;  Surgeon: Kael Lopez MD;  Location: [facility] OR;  Service:    • HYSTERECTOMY     • KNEE SURGERY     • LAPAROSCOPIC SALPINGOOPHERECTOMY     • PORTACATH PLACEMENT N/A 2017    Procedure: INSERTION OF PORTACATH;  Surgeon: Celso Arredondo MD;  Location: [facility];  Service:    • SHOULDER SURGERY      3 times         Social History:     Social History     Socioeconomic History   • Marital status:      Spouse name: Not on file   • Number of children: Not on file   • Years of education: Not on file   • Highest education level: Not on file   Tobacco Use   • Smoking status: Former Smoker     Packs/day: 1.00     Years: 20.00     Pack years: 20.00     Quit date: 2015     Years since quittin.4   • Smokeless tobacco: Never Used   Substance and Sexual Activity   • Alcohol use: No   • Drug use: Yes     Types: Marijuana     Comment: for pain   • Sexual activity: Defer     Birth control/protection: Surgical       Family History:     Family History   Problem Relation Age of Onset   • Crohn's disease Other    • Hypertension Other    • Diabetes Other    • Irritable bowel syndrome Other    • No Known Problems Father    • No Known Problems Mother        Review of Systems:     Review of Systems   Constitutional: Negative.  Negative for activity change, appetite change, chills, diaphoresis, fatigue and unexpected weight change.   HENT: Negative for congestion, dental problem, drooling, ear discharge, ear pain, facial swelling, hearing loss, mouth sores, nosebleeds, postnasal drip, rhinorrhea, sinus  pressure, sneezing, sore throat, tinnitus, trouble swallowing and voice change.    Eyes: Negative.  Negative for photophobia, pain, discharge, redness, itching and visual disturbance.   Respiratory: Negative.  Negative for apnea, cough, choking, chest tightness, shortness of breath, wheezing and stridor.    Cardiovascular: Negative.  Negative for chest pain, palpitations and leg swelling.   Gastrointestinal: Negative.  Negative for abdominal distention, abdominal pain, anal bleeding, blood in stool, constipation, diarrhea, nausea, rectal pain and vomiting.   Endocrine: Negative.  Negative for cold intolerance, heat intolerance, polydipsia, polyphagia and polyuria.   Genitourinary: Positive for difficulty urinating.   Musculoskeletal: Negative.  Negative for arthralgias, back pain, gait problem, joint swelling, myalgias, neck pain and neck stiffness.   Skin: Negative.  Negative for color change, pallor, rash and wound.   Allergic/Immunologic: Negative.  Negative for environmental allergies, food allergies and immunocompromised state.   Neurological: Negative.  Negative for dizziness, tremors, seizures, syncope, facial asymmetry, speech difficulty, weakness, light-headedness, numbness and headaches.   Hematological: Negative.  Negative for adenopathy. Does not bruise/bleed easily.   Psychiatric/Behavioral: Negative for agitation, behavioral problems, confusion, decreased concentration, dysphoric mood, hallucinations, self-injury, sleep disturbance and suicidal ideas. The patient is not nervous/anxious and is not hyperactive.    All other systems reviewed and are negative.      Physical Exam:     Physical Exam  Constitutional:       Appearance: She is well-developed.   HENT:      Head: Normocephalic and atraumatic.      Right Ear: External ear normal.      Left Ear: External ear normal.   Eyes:      Conjunctiva/sclera: Conjunctivae normal.      Pupils: Pupils are equal, round, and reactive to light.   Cardiovascular:         Rate and Rhythm: Normal rate and regular rhythm.      Heart sounds: Normal heart sounds.   Pulmonary:      Effort: Pulmonary effort is normal.      Breath sounds: Normal breath sounds.   Abdominal:      General: Bowel sounds are normal. There is no distension.      Palpations: Abdomen is soft. There is no mass.      Tenderness: There is no abdominal tenderness. There is no guarding or rebound.   Genitourinary:     General: Normal vulva.      Vagina: No vaginal discharge.   Musculoskeletal:         General: Normal range of motion.   Skin:     General: Skin is warm and dry.   Neurological:      Mental Status: She is alert.      Deep Tendon Reflexes: Reflexes are normal and symmetric.   Psychiatric:         Behavior: Behavior normal.         Thought Content: Thought content normal.         Judgment: Judgment normal.         I have reviewed the following portions of the patient's history: allergies, current medications, past family history, past medical history, past social history, past surgical history, problem list and ROS and confirm it's accurate.      Procedure:   Urethral dilation-after an appropriate informed consent the patient was brought to the procedure suite.  The urethra was gently anesthetized with 10 cc of 2% viscous Xylocaine jelly.  After an adequate period of topical anesthesia I went ahead and the urethra was gently anesthetized and dilated with [place] sounds from 16-28 [place] sequentially without complication the patient was given gentamicin as prophylaxis with 80 mg    Assessment/Plan:   Postprocedural urethral stricture-status post dilation  Narcotic pain medication-patient has significant acute pain that I believe would be an indication for the use of narcotic pain medication.  I discussed the significant risks of pain medication and the fact that this will be a short only option and I will give her no more than a three-day supply of pain medication.  And I will not plan long-term  medication and that this will be sent to a pain clinic if at all becomes necessary.  We discussed signing a pain medication agreement and the fact that we're going to run a state [place] review to be sure the patient is not getting pain medication from elsewhere.  If this is the case we will not give pain medication.  As part of the patient's treatment plan of there being prescribed a controlled substance with potential for abuse.  This patient has been wait aware of the appropriate dose of such medications including, the risk for somnolence, limited ability to drive and/or safety and the significant potential for overdose.  It is been made clear that these medications are for the prescribed patient only without concomitant use of alcohol or other sepsis unless prescribed by the medical provider.  Has completed prescribing agreement detailing the terms of continue prescribing him a controlled substance.  Including monitoring Luis Alberto ports, the possibility of urine drug screens and pedal counts.  The patient is aware that we reviewed LUIS ALBERTO reports on a regular basis and scan them into the chart.  History and physical examination exhibited continued safe and appropriate use of controlled substances.  Also discussed the fact that the new [place] legislation allows only a three-day prescription for pain medication.  In this situation he will be referred to a chronic pain clinic.  She is in the pain clinic but she is allowed to have postprocedural pain medication            Patient's Body mass index is 19.61 kg/m². BMI is within normal parameters. No follow-up required..              This document has been electronically signed by VERENICE FINK MD April 16, 2021 14:41 EDT

## 2021-04-21 ENCOUNTER — HOSPITAL ENCOUNTER (EMERGENCY)
Facility: HOSPITAL | Age: 46
Discharge: HOME | End: 2021-04-22
Attending: STUDENT IN AN ORGANIZED HEALTH CARE EDUCATION/TRAINING PROGRAM | Admitting: STUDENT IN AN ORGANIZED HEALTH CARE EDUCATION/TRAINING PROGRAM

## 2021-04-21 DIAGNOSIS — R10.9 FLANK PAIN: ICD-10-CM

## 2021-04-21 DIAGNOSIS — N35.92 STRICTURE OF FEMALE URETHRA, UNSPECIFIED STRICTURE TYPE: Primary | ICD-10-CM

## 2021-04-21 PROCEDURE — 96374 THER/PROPH/DIAG INJ IV PUSH: CPT

## 2021-04-21 PROCEDURE — 99284 EMERGENCY DEPT VISIT MOD MDM: CPT

## 2021-04-21 PROCEDURE — 96376 TX/PRO/DX INJ SAME DRUG ADON: CPT

## 2021-04-21 PROCEDURE — 36415 COLL VENOUS BLD VENIPUNCTURE: CPT

## 2021-04-22 VITALS
DIASTOLIC BLOOD PRESSURE: 80 MMHG | OXYGEN SATURATION: 97 % | SYSTOLIC BLOOD PRESSURE: 140 MMHG | WEIGHT: 120 LBS | RESPIRATION RATE: 18 BRPM | HEART RATE: 83 BPM | BODY MASS INDEX: 18.19 KG/M2 | TEMPERATURE: 97.5 F | HEIGHT: 68 IN

## 2021-04-22 LAB
ALBUMIN SERPL-MCNC: 3.84 G/DL (ref 3.5–5.2)
ALBUMIN/GLOB SERPL: 1.4 G/DL
ALP SERPL-CCNC: 75 U/L (ref 39–117)
ALT SERPL W P-5'-P-CCNC: 37 U/L (ref 1–33)
ANION GAP SERPL CALCULATED.3IONS-SCNC: 8.5 MMOL/L (ref 5–15)
AST SERPL-CCNC: 33 U/L (ref 1–32)
BACTERIA UR QL AUTO: ABNORMAL /HPF
BASOPHILS # BLD AUTO: 0.01 10*3/MM3 (ref 0–0.2)
BASOPHILS NFR BLD AUTO: 0.2 % (ref 0–1.5)
BILIRUB SERPL-MCNC: 0.6 MG/DL (ref 0–1.2)
BILIRUB UR QL STRIP: NEGATIVE
BUN SERPL-MCNC: 5 MG/DL (ref 6–20)
BUN/CREAT SERPL: 8.8 (ref 7–25)
CALCIUM SPEC-SCNC: 9 MG/DL (ref 8.6–10.5)
CHLORIDE SERPL-SCNC: 104 MMOL/L (ref 98–107)
CLARITY UR: ABNORMAL
CO2 SERPL-SCNC: 29.5 MMOL/L (ref 22–29)
COLOR UR: ABNORMAL
CREAT SERPL-MCNC: 0.57 MG/DL (ref 0.57–1)
DEPRECATED RDW RBC AUTO: 44.2 FL (ref 37–54)
EOSINOPHIL # BLD AUTO: 0.16 10*3/MM3 (ref 0–0.4)
EOSINOPHIL NFR BLD AUTO: 3.1 % (ref 0.3–6.2)
ERYTHROCYTE [DISTWIDTH] IN BLOOD BY AUTOMATED COUNT: 13.2 % (ref 12.3–15.4)
GFR SERPL CREATININE-BSD FRML MDRD: 114 ML/MIN/1.73
GLOBULIN UR ELPH-MCNC: 2.7 GM/DL
GLUCOSE SERPL-MCNC: 91 MG/DL (ref 65–99)
GLUCOSE UR STRIP-MCNC: NEGATIVE MG/DL
HCT VFR BLD AUTO: 43.3 % (ref 34–46.6)
HGB BLD-MCNC: 14.5 G/DL (ref 12–15.9)
HGB UR QL STRIP.AUTO: ABNORMAL
HOLD SPECIMEN: NORMAL
HOLD SPECIMEN: NORMAL
HYALINE CASTS UR QL AUTO: ABNORMAL /LPF
IMM GRANULOCYTES # BLD AUTO: 0.01 10*3/MM3 (ref 0–0.05)
IMM GRANULOCYTES NFR BLD AUTO: 0.2 % (ref 0–0.5)
KETONES UR QL STRIP: NEGATIVE
LEUKOCYTE ESTERASE UR QL STRIP.AUTO: ABNORMAL
LYMPHOCYTES # BLD AUTO: 2.12 10*3/MM3 (ref 0.7–3.1)
LYMPHOCYTES NFR BLD AUTO: 40.8 % (ref 19.6–45.3)
MCH RBC QN AUTO: 30.5 PG (ref 26.6–33)
MCHC RBC AUTO-ENTMCNC: 33.5 G/DL (ref 31.5–35.7)
MCV RBC AUTO: 91.2 FL (ref 79–97)
MONOCYTES # BLD AUTO: 0.4 10*3/MM3 (ref 0.1–0.9)
MONOCYTES NFR BLD AUTO: 7.7 % (ref 5–12)
NEUTROPHILS NFR BLD AUTO: 2.5 10*3/MM3 (ref 1.7–7)
NEUTROPHILS NFR BLD AUTO: 48 % (ref 42.7–76)
NITRITE UR QL STRIP: NEGATIVE
NRBC BLD AUTO-RTO: 0 /100 WBC (ref 0–0.2)
PH UR STRIP.AUTO: 8.5 [PH] (ref 5–8)
PLATELET # BLD AUTO: 146 10*3/MM3 (ref 140–450)
PMV BLD AUTO: 10.3 FL (ref 6–12)
POTASSIUM SERPL-SCNC: 3.8 MMOL/L (ref 3.5–5.2)
PROT SERPL-MCNC: 6.5 G/DL (ref 6–8.5)
PROT UR QL STRIP: ABNORMAL
RBC # BLD AUTO: 4.75 10*6/MM3 (ref 3.77–5.28)
RBC # UR: ABNORMAL /HPF
REF LAB TEST METHOD: ABNORMAL
SODIUM SERPL-SCNC: 142 MMOL/L (ref 136–145)
SP GR UR STRIP: 1.02 (ref 1–1.03)
SQUAMOUS #/AREA URNS HPF: ABNORMAL /HPF
UROBILINOGEN UR QL STRIP: ABNORMAL
WBC # BLD AUTO: 5.2 10*3/MM3 (ref 3.4–10.8)
WBC UR QL AUTO: ABNORMAL /HPF
WHOLE BLOOD HOLD SPECIMEN: NORMAL
WHOLE BLOOD HOLD SPECIMEN: NORMAL

## 2021-04-22 PROCEDURE — 63710000001 PROMETHAZINE PER 25 MG: Performed by: STUDENT IN AN ORGANIZED HEALTH CARE EDUCATION/TRAINING PROGRAM

## 2021-04-22 PROCEDURE — 96374 THER/PROPH/DIAG INJ IV PUSH: CPT

## 2021-04-22 PROCEDURE — 81001 URINALYSIS AUTO W/SCOPE: CPT | Performed by: STUDENT IN AN ORGANIZED HEALTH CARE EDUCATION/TRAINING PROGRAM

## 2021-04-22 PROCEDURE — 25010000002 MORPHINE PER 10 MG: Performed by: STUDENT IN AN ORGANIZED HEALTH CARE EDUCATION/TRAINING PROGRAM

## 2021-04-22 PROCEDURE — 96376 TX/PRO/DX INJ SAME DRUG ADON: CPT

## 2021-04-22 PROCEDURE — 36415 COLL VENOUS BLD VENIPUNCTURE: CPT

## 2021-04-22 PROCEDURE — 85025 COMPLETE CBC W/AUTO DIFF WBC: CPT | Performed by: STUDENT IN AN ORGANIZED HEALTH CARE EDUCATION/TRAINING PROGRAM

## 2021-04-22 PROCEDURE — 80053 COMPREHEN METABOLIC PANEL: CPT | Performed by: STUDENT IN AN ORGANIZED HEALTH CARE EDUCATION/TRAINING PROGRAM

## 2021-04-22 RX ORDER — PROMETHAZINE HYDROCHLORIDE 25 MG/1
12.5 TABLET ORAL ONCE
Status: COMPLETED | OUTPATIENT
Start: 2021-04-22 | End: 2021-04-22

## 2021-04-22 RX ADMIN — PROMETHAZINE HYDROCHLORIDE 12.5 MG: 25 TABLET ORAL at 00:10

## 2021-04-22 RX ADMIN — MORPHINE SULFATE 4 MG: 4 INJECTION, SOLUTION INTRAMUSCULAR; INTRAVENOUS at 01:05

## 2021-04-22 RX ADMIN — MORPHINE SULFATE 4 MG: 4 INJECTION, SOLUTION INTRAMUSCULAR; INTRAVENOUS at 00:11

## 2021-04-22 NOTE — ED PROVIDER NOTES
Subjective   History of Present Illness  46-year-old female came to the emergency department [facility] for evaluation of flank pain and hematuria.  She says the pain has been worse for the past 2 to 3 days.  She has had hematuria for the past 2 to 3 days.  Is not had any fever or chills.  She is having some nausea and the intensity of the pain she says.  She was given a shot of Rocephin and urology's office last Friday.  She is not having any painful urination.  Not have any vaginal bleeding.  Is not have any decreased urine output says she has a long history of recurring kidney stones.  She says she follows with Dr. Mcintyre in the outpatient setting.  He was seen in his office on the 16th she has a history of chronic urethral stricture.  She requires frequent dilation.  Treated with pain medication from her urologist.  She has never needed to have a stent in the past.  She has never needed to have any of her stones broken up in the past.  Is requesting medication for pain and nausea.  Review of Systems   Constitutional: Negative.  Negative for chills and fever.   HENT: Negative.    Eyes: Negative.    Respiratory: Negative.    Cardiovascular: Negative.    Gastrointestinal: Negative.    Endocrine: Negative.    Genitourinary: Positive for flank pain and hematuria.   Skin: Negative.    Allergic/Immunologic: Negative.    Neurological: Negative.    Hematological: Negative.    Psychiatric/Behavioral: Negative.        Past Medical History:   Diagnosis Date   • Abdominal pain    • Abdominal swelling    • Hoyos's disease    • Bipolar 1 disorder (CMS/HCC)    • Brain tumor (benign) (CMS/HCC)    • Brain tumor (CMS/HCC)     R Frontal Lobe per pt   • Brain tumor (CMS/HCC) 2014   • Constipation    • DDD (degenerative disc disease), cervical 05/29/2017   • DDD (degenerative disc disease), cervical    • Diarrhea    • Fibromyalgia    • IBS (irritable bowel syndrome)    • Migraine    • Nausea & vomiting    • PONV (postoperative nausea  and vomiting)    • PTSD (post-traumatic stress disorder)    • Rectal bleeding        Allergies   Allergen Reactions   • Ativan [Lorazepam] Hallucinations     confusion   • Sulfa Antibiotics Shortness Of Breath and Swelling   • Sulfa Antibiotics Anaphylaxis   • Reglan [Metoclopramide] Angioedema   • Compazine [Prochlorperazine Edisylate] Hives   • Demerol [Meperidine] Hives   • Droperidol Itching   • Metoclopramide Swelling   • Toradol [Ketorolac Tromethamine] Hives and Itching   • Toradol [Ketorolac Tromethamine] Hives   • Zofran [Ondansetron Hcl] Rash   • Zosyn [Piperacillin Sod-Tazobactam So] Hives       Past Surgical History:   Procedure Laterality Date   • ANAL SCOPE N/A 7/28/2016    Procedure: ANAL SCOPE;  Surgeon: Kael Lopez MD;  [facility]: [facility];  Service:    • APPENDECTOMY     • COLONOSCOPY N/A 6/30/2016    Procedure: COLONOSCOPY  CPTCODE:35515;  Surgeon: Jose Antonio Belle III, MD;  Location: [facility] OR;  Service:    • COLONOSCOPY N/A 7/7/2016    Procedure: COLONOSCOPY (43407) CPT;  Surgeon: Jose Antonio Belle III, MD;  Location: [facility] OR;  Service:    • CYSTOSCOPY RETROGRADE PYELOGRAM Bilateral 4/28/2017    Procedure: CYSTOSCOPY RETROGRADE PYELOGRAM;  Surgeon: Mu Blunt MD;  Location: [facility] OR;  Service:    • ENDOSCOPY N/A 6/30/2016    Procedure: ESOPHAGOGASTRODUODENOSCOPY WITH BIOPSY  CPTCODE:46033;  Surgeon: Jose Antonio Belle III, MD;  Location: [facility] OR;  Service:    • HEMORRHOIDECTOMY N/A 7/28/2016    Procedure: HEMORRHOID STAPLING;  Surgeon: Kael Lopez MD;  Location: [facility] OR;  Service:    • HYSTERECTOMY     • KNEE SURGERY     • LAPAROSCOPIC SALPINGOOPHERECTOMY     • PORTACATH PLACEMENT N/A 7/28/2017    Procedure: INSERTION OF PORTACATH;  Surgeon: Celso Arredondo MD;  Location: [facility] OR;  Service:    • SHOULDER SURGERY      3 times       Family History   Problem Relation Age of Onset   • Crohn's disease Other    • Hypertension Other    • Diabetes Other    •  Irritable bowel syndrome Other    • No Known Problems Father    • No Known Problems Mother        Social History     Socioeconomic History   • Marital status:      Spouse name: Not on file   • Number of children: Not on file   • Years of education: Not on file   • Highest education level: Not on file   Tobacco Use   • Smoking status: Former Smoker     Packs/day: 1.00     Years: 20.00     Pack years: 20.00     Quit date: 2015     Years since quittin.4   • Smokeless tobacco: Never Used   Substance and Sexual Activity   • Alcohol use: No   • Drug use: Yes     Types: Marijuana     Comment: for pain   • Sexual activity: Defer     Birth control/protection: Surgical           Objective   Physical Exam  Vitals and nursing note reviewed. Exam conducted with a chaperone present.   Constitutional:       General: She is not in acute distress.     Appearance: Normal appearance. She is not ill-appearing or toxic-appearing.   HENT:      Head: Normocephalic and atraumatic.      Right Ear: External ear normal.      Left Ear: External ear normal.      Nose: Nose normal.      Mouth/Throat:      Mouth: Mucous membranes are moist.      Pharynx: Oropharynx is clear.   Eyes:      Extraocular Movements: Extraocular movements intact.      Pupils: Pupils are equal, round, and reactive to light.   Cardiovascular:      Rate and Rhythm: Normal rate and regular rhythm.      Pulses: Normal pulses.      Heart sounds: Normal heart sounds.   Pulmonary:      Effort: Pulmonary effort is normal.      Breath sounds: Normal breath sounds.   Abdominal:      General: Abdomen is flat. Bowel sounds are normal.      Palpations: Abdomen is soft.   Musculoskeletal:         General: Normal range of motion.      Cervical back: Normal range of motion and neck supple.   Skin:     General: Skin is warm and dry.      Capillary Refill: Capillary refill takes less than 2 seconds.   Neurological:      General: No focal deficit present.      Mental  Status: She is alert and oriented to person, place, and time.   Psychiatric:         Mood and Affect: Mood normal.         Behavior: Behavior normal.         Thought Content: Thought content normal.         Judgment: Judgment normal.         Procedures           ED Course  ED Course as of Apr 22 1745   Thu Apr 22, 2021   0058 Patient was very polite she continue to complement me on my medical decision making on my care.  She would like to see her urologist in the morning.  He has been providing her pain medication for ongoing urological issues.  I do not feel is appropriate to provide her with any prescriptions for narcotics to go at this time.  Her pain is well controlled in the emergency department.  She will need a ride home and she understands this.  We again discussed the possibility of obtaining a CT to rule out other forms of pathology however she is confident that she does not want the CT scan.    [JM]      ED Course User Index  [JM] Jacques Aceves, DO                                           MDM  Number of Diagnoses or Management Options  Flank pain: new and requires workup  Stricture of female urethra, unspecified stricture type: new and requires workup     Amount and/or Complexity of Data Reviewed  Clinical lab tests: reviewed and ordered  Tests in the radiology section of CPT®: ordered and reviewed  Tests in the medicine section of CPT®: ordered and reviewed  Decide to obtain previous medical records or to obtain history from someone other than the patient: yes  Obtain history from someone other than the patient: yes  Review and summarize past medical records: yes  Independent visualization of images, tracings, or specimens: yes    Risk of Complications, Morbidity, and/or Mortality  Presenting problems: moderate  Diagnostic procedures: moderate  Management options: moderate    Patient Progress  Patient progress: stable      Final diagnoses:   Stricture of female urethra, unspecified stricture type    Flank pain       ED Disposition  ED Disposition     ED Disposition Condition Comment    Discharge Stable           Mu Blunt MD  [address] [facility]  [address]  [facility] [address]  [phone]    In 1 day  for ongoing flank pain and urethral stricture.    [facility] ED  [address] [facility] [id number] [phone]    Please return to the emergency department if you are having any worsening symptoms.         Medication List      No changes were made to your prescriptions during this visit.          Jacques Aceves,   04/22/21 1746

## 2021-04-22 NOTE — ED NOTES
Unable to place pt on cardiac monitor due to being in hallway bed. Physician aware.      Kacie Islas, RN  04/22/21 0138

## 2021-04-23 ENCOUNTER — HOSPITAL ENCOUNTER (EMERGENCY)
Facility: HOSPITAL | Age: 46
Discharge: HOME | End: 2021-04-23
Attending: EMERGENCY MEDICINE | Admitting: STUDENT IN AN ORGANIZED HEALTH CARE EDUCATION/TRAINING PROGRAM

## 2021-04-23 ENCOUNTER — APPOINTMENT (OUTPATIENT)
Dept: CT IMAGING | Facility: HOSPITAL | Age: 46
End: 2021-04-23

## 2021-04-23 VITALS
SYSTOLIC BLOOD PRESSURE: 131 MMHG | TEMPERATURE: 98.2 F | BODY MASS INDEX: 18.79 KG/M2 | WEIGHT: 124 LBS | RESPIRATION RATE: 18 BRPM | HEIGHT: 68 IN | DIASTOLIC BLOOD PRESSURE: 82 MMHG | HEART RATE: 70 BPM | OXYGEN SATURATION: 99 %

## 2021-04-23 DIAGNOSIS — K52.9 ENTERITIS: Primary | ICD-10-CM

## 2021-04-23 LAB
6-ACETYL MORPHINE: NEGATIVE
ALBUMIN SERPL-MCNC: 4.26 G/DL (ref 3.5–5.2)
ALBUMIN/GLOB SERPL: 1.7 G/DL
ALP SERPL-CCNC: 78 U/L (ref 39–117)
ALT SERPL W P-5'-P-CCNC: 39 U/L (ref 1–33)
AMPHET+METHAMPHET UR QL: NEGATIVE
AMYLASE SERPL-CCNC: 54 U/L (ref 28–100)
ANION GAP SERPL CALCULATED.3IONS-SCNC: 8.4 MMOL/L (ref 5–15)
AST SERPL-CCNC: 33 U/L (ref 1–32)
BACTERIA UR QL AUTO: ABNORMAL /HPF
BARBITURATES UR QL SCN: NEGATIVE
BASOPHILS # BLD AUTO: 0.01 10*3/MM3 (ref 0–0.2)
BASOPHILS NFR BLD AUTO: 0.2 % (ref 0–1.5)
BENZODIAZ UR QL SCN: NEGATIVE
BILIRUB SERPL-MCNC: 0.9 MG/DL (ref 0–1.2)
BILIRUB UR QL STRIP: NEGATIVE
BUN SERPL-MCNC: 8 MG/DL (ref 6–20)
BUN/CREAT SERPL: 12.5 (ref 7–25)
BUPRENORPHINE SERPL-MCNC: POSITIVE NG/ML
CALCIUM SPEC-SCNC: 9.3 MG/DL (ref 8.6–10.5)
CANNABINOIDS SERPL QL: POSITIVE
CHLORIDE SERPL-SCNC: 103 MMOL/L (ref 98–107)
CLARITY UR: CLEAR
CO2 SERPL-SCNC: 28.6 MMOL/L (ref 22–29)
COCAINE UR QL: NEGATIVE
COLOR UR: ABNORMAL
CREAT SERPL-MCNC: 0.64 MG/DL (ref 0.57–1)
CRP SERPL-MCNC: <0.3 MG/DL (ref 0–0.5)
DEPRECATED RDW RBC AUTO: 44.3 FL (ref 37–54)
EOSINOPHIL # BLD AUTO: 0.07 10*3/MM3 (ref 0–0.4)
EOSINOPHIL NFR BLD AUTO: 1.4 % (ref 0.3–6.2)
ERYTHROCYTE [DISTWIDTH] IN BLOOD BY AUTOMATED COUNT: 13.2 % (ref 12.3–15.4)
GFR SERPL CREATININE-BSD FRML MDRD: 100 ML/MIN/1.73
GLOBULIN UR ELPH-MCNC: 2.5 GM/DL
GLUCOSE SERPL-MCNC: 109 MG/DL (ref 65–99)
GLUCOSE UR STRIP-MCNC: NEGATIVE MG/DL
HCT VFR BLD AUTO: 44.6 % (ref 34–46.6)
HGB BLD-MCNC: 14.9 G/DL (ref 12–15.9)
HGB UR QL STRIP.AUTO: ABNORMAL
HOLD SPECIMEN: NORMAL
HOLD SPECIMEN: NORMAL
HYALINE CASTS UR QL AUTO: ABNORMAL /LPF
IMM GRANULOCYTES # BLD AUTO: 0 10*3/MM3 (ref 0–0.05)
IMM GRANULOCYTES NFR BLD AUTO: 0 % (ref 0–0.5)
KETONES UR QL STRIP: NEGATIVE
LEUKOCYTE ESTERASE UR QL STRIP.AUTO: ABNORMAL
LIPASE SERPL-CCNC: 26 U/L (ref 13–60)
LYMPHOCYTES # BLD AUTO: 1.89 10*3/MM3 (ref 0.7–3.1)
LYMPHOCYTES NFR BLD AUTO: 38.4 % (ref 19.6–45.3)
MCH RBC QN AUTO: 30.3 PG (ref 26.6–33)
MCHC RBC AUTO-ENTMCNC: 33.4 G/DL (ref 31.5–35.7)
MCV RBC AUTO: 90.7 FL (ref 79–97)
METHADONE UR QL SCN: NEGATIVE
MONOCYTES # BLD AUTO: 0.33 10*3/MM3 (ref 0.1–0.9)
MONOCYTES NFR BLD AUTO: 6.7 % (ref 5–12)
NEUTROPHILS NFR BLD AUTO: 2.62 10*3/MM3 (ref 1.7–7)
NEUTROPHILS NFR BLD AUTO: 53.3 % (ref 42.7–76)
NITRITE UR QL STRIP: NEGATIVE
NRBC BLD AUTO-RTO: 0 /100 WBC (ref 0–0.2)
OPIATES UR QL: POSITIVE
OXYCODONE UR QL SCN: NEGATIVE
PCP UR QL SCN: NEGATIVE
PH UR STRIP.AUTO: 7 [PH] (ref 5–8)
PLATELET # BLD AUTO: 153 10*3/MM3 (ref 140–450)
PMV BLD AUTO: 10.3 FL (ref 6–12)
POTASSIUM SERPL-SCNC: 3.6 MMOL/L (ref 3.5–5.2)
PROT SERPL-MCNC: 6.8 G/DL (ref 6–8.5)
PROT UR QL STRIP: ABNORMAL
RBC # BLD AUTO: 4.92 10*6/MM3 (ref 3.77–5.28)
RBC # UR: ABNORMAL /HPF
REF LAB TEST METHOD: ABNORMAL
SODIUM SERPL-SCNC: 140 MMOL/L (ref 136–145)
SP GR UR STRIP: 1.02 (ref 1–1.03)
SQUAMOUS #/AREA URNS HPF: ABNORMAL /HPF
UROBILINOGEN UR QL STRIP: ABNORMAL
WBC # BLD AUTO: 4.92 10*3/MM3 (ref 3.4–10.8)
WBC UR QL AUTO: ABNORMAL /HPF
WHOLE BLOOD HOLD SPECIMEN: NORMAL
WHOLE BLOOD HOLD SPECIMEN: NORMAL

## 2021-04-23 PROCEDURE — 81001 URINALYSIS AUTO W/SCOPE: CPT | Performed by: PHYSICIAN ASSISTANT

## 2021-04-23 PROCEDURE — 80307 DRUG TEST PRSMV CHEM ANLYZR: CPT | Performed by: PHYSICIAN ASSISTANT

## 2021-04-23 PROCEDURE — 63710000001 PROMETHAZINE PER 25 MG: Performed by: PHYSICIAN ASSISTANT

## 2021-04-23 PROCEDURE — 25010000002 HYDROMORPHONE PER 4 MG: Performed by: STUDENT IN AN ORGANIZED HEALTH CARE EDUCATION/TRAINING PROGRAM

## 2021-04-23 PROCEDURE — 85025 COMPLETE CBC W/AUTO DIFF WBC: CPT | Performed by: PHYSICIAN ASSISTANT

## 2021-04-23 PROCEDURE — 99283 EMERGENCY DEPT VISIT LOW MDM: CPT

## 2021-04-23 PROCEDURE — 74177 CT ABD & PELVIS W/CONTRAST: CPT | Performed by: RADIOLOGY

## 2021-04-23 PROCEDURE — 80053 COMPREHEN METABOLIC PANEL: CPT | Performed by: PHYSICIAN ASSISTANT

## 2021-04-23 PROCEDURE — 83690 ASSAY OF LIPASE: CPT | Performed by: PHYSICIAN ASSISTANT

## 2021-04-23 PROCEDURE — 25010000002 IOPAMIDOL 61 % SOLUTION: Performed by: PHYSICIAN ASSISTANT

## 2021-04-23 PROCEDURE — 82150 ASSAY OF AMYLASE: CPT | Performed by: PHYSICIAN ASSISTANT

## 2021-04-23 PROCEDURE — 25010000002 HYDROMORPHONE 1 MG/ML SOLUTION: Performed by: STUDENT IN AN ORGANIZED HEALTH CARE EDUCATION/TRAINING PROGRAM

## 2021-04-23 PROCEDURE — 96374 THER/PROPH/DIAG INJ IV PUSH: CPT

## 2021-04-23 PROCEDURE — 74177 CT ABD & PELVIS W/CONTRAST: CPT

## 2021-04-23 PROCEDURE — 96376 TX/PRO/DX INJ SAME DRUG ADON: CPT

## 2021-04-23 PROCEDURE — 86140 C-REACTIVE PROTEIN: CPT | Performed by: PHYSICIAN ASSISTANT

## 2021-04-23 RX ORDER — SODIUM CHLORIDE 0.9 % (FLUSH) 0.9 %
10 SYRINGE (ML) INJECTION AS NEEDED
Status: DISCONTINUED | OUTPATIENT
Start: 2021-04-23 | End: 2021-04-23 | Stop reason: HOSPADM

## 2021-04-23 RX ORDER — HYDROMORPHONE HYDROCHLORIDE 1 MG/ML
0.5 INJECTION, SOLUTION INTRAMUSCULAR; INTRAVENOUS; SUBCUTANEOUS ONCE
Status: COMPLETED | OUTPATIENT
Start: 2021-04-23 | End: 2021-04-23

## 2021-04-23 RX ORDER — PROMETHAZINE HYDROCHLORIDE 25 MG/1
25 TABLET ORAL EVERY 6 HOURS PRN
Qty: 15 TABLET | Refills: 0 | OUTPATIENT
Start: 2021-04-23 | End: 2021-06-20

## 2021-04-23 RX ORDER — PROMETHAZINE HYDROCHLORIDE 25 MG/1
12.5 TABLET ORAL ONCE
Status: COMPLETED | OUTPATIENT
Start: 2021-04-23 | End: 2021-04-23

## 2021-04-23 RX ORDER — METRONIDAZOLE 500 MG/1
500 TABLET ORAL 3 TIMES DAILY
Qty: 21 TABLET | Refills: 0 | OUTPATIENT
Start: 2021-04-23 | End: 2021-06-20

## 2021-04-23 RX ADMIN — SODIUM CHLORIDE 1000 ML: 9 INJECTION, SOLUTION INTRAVENOUS at 19:20

## 2021-04-23 RX ADMIN — HYDROMORPHONE HYDROCHLORIDE 0.5 MG: 1 INJECTION, SOLUTION INTRAMUSCULAR; INTRAVENOUS; SUBCUTANEOUS at 19:26

## 2021-04-23 RX ADMIN — PROMETHAZINE HYDROCHLORIDE 12.5 MG: 25 TABLET ORAL at 19:26

## 2021-04-23 RX ADMIN — IOPAMIDOL 90 ML: 612 INJECTION, SOLUTION INTRAVENOUS at 20:53

## 2021-04-23 RX ADMIN — HYDROMORPHONE HYDROCHLORIDE 1 MG: 1 INJECTION, SOLUTION INTRAMUSCULAR; INTRAVENOUS; SUBCUTANEOUS at 20:43

## 2021-04-23 NOTE — ED PROVIDER NOTES
Subjective   46-year-old female presents to the emergency room with left-sided abdominal pain and vomiting.  Patient states over the last 3 to 4 days she has had this abdominal pain and states she was seen here yesterday for said complaint and a CT scan was never performed on her.  She states since that time her abdominal pain has worsened.  She denies fever or sick contacts.  She states she is not able to eat anything secondary to the nausea and has not ate in 4 days.  Denies any alleviating factors.  Denies any other complaints or concerns at this time.      History provided by:  Patient   used: No        Review of Systems   Constitutional: Negative.  Negative for fever.   HENT: Negative.    Respiratory: Negative.    Cardiovascular: Negative.  Negative for chest pain.   Gastrointestinal: Positive for abdominal pain, nausea and vomiting. Negative for diarrhea.   Endocrine: Negative.    Genitourinary: Negative.  Negative for dysuria.   Skin: Negative.    Neurological: Negative.    Psychiatric/Behavioral: Negative.    All other systems reviewed and are negative.      Past Medical History:   Diagnosis Date   • Abdominal pain    • Abdominal swelling    • Hoyos's disease    • Bipolar 1 disorder (CMS/HCC)    • Brain tumor (benign) (CMS/HCC)    • Brain tumor (CMS/HCC)     R Frontal Lobe per pt   • Brain tumor (CMS/HCC) 2014   • Constipation    • DDD (degenerative disc disease), cervical 05/29/2017   • DDD (degenerative disc disease), cervical    • Diarrhea    • Fibromyalgia    • IBS (irritable bowel syndrome)    • Migraine    • Nausea & vomiting    • PONV (postoperative nausea and vomiting)    • PTSD (post-traumatic stress disorder)    • Rectal bleeding        Allergies   Allergen Reactions   • Ativan [Lorazepam] Hallucinations     confusion   • Sulfa Antibiotics Shortness Of Breath and Swelling   • Sulfa Antibiotics Anaphylaxis   • Reglan [Metoclopramide] Angioedema   • Compazine [Prochlorperazine  Edisylate] Hives   • Demerol [Meperidine] Hives   • Droperidol Itching   • Metoclopramide Swelling   • Toradol [Ketorolac Tromethamine] Hives and Itching   • Toradol [Ketorolac Tromethamine] Hives   • Zofran [Ondansetron Hcl] Rash   • Zosyn [Piperacillin Sod-Tazobactam So] Hives       Past Surgical History:   Procedure Laterality Date   • ANAL SCOPE N/A 7/28/2016    Procedure: ANAL SCOPE;  Surgeon: Kael Lopez MD;  Location: [facility] OR;  Service:    • APPENDECTOMY     • COLONOSCOPY N/A 6/30/2016    Procedure: COLONOSCOPY  CPTCODE:62674;  Surgeon: Jose Antonio Belle III, MD;  Location: [facility] OR;  Service:    • COLONOSCOPY N/A 7/7/2016    Procedure: COLONOSCOPY (69608) CPT;  Surgeon: Jose Antonio Belle III, MD;  Location: [facility] OR;  Service:    • CYSTOSCOPY RETROGRADE PYELOGRAM Bilateral 4/28/2017    Procedure: CYSTOSCOPY RETROGRADE PYELOGRAM;  Surgeon: Mu Blunt MD;  Location: [facility] OR;  Service:    • ENDOSCOPY N/A 6/30/2016    Procedure: ESOPHAGOGASTRODUODENOSCOPY WITH BIOPSY  CPTCODE:08984;  Surgeon: Jose Antonio Belle III, MD;  Location: [facility] OR;  Service:    • HEMORRHOIDECTOMY N/A 7/28/2016    Procedure: HEMORRHOID STAPLING;  Surgeon: Kael Lopez MD;  Location: [facility] OR;  Service:    • HYSTERECTOMY     • KNEE SURGERY     • LAPAROSCOPIC SALPINGOOPHERECTOMY     • PORTACATH PLACEMENT N/A 7/28/2017    Procedure: INSERTION OF PORTACATH;  Surgeon: Celso Arredondo MD;  Location: [facility] OR;  Service:    • SHOULDER SURGERY      3 times       Family History   Problem Relation Age of Onset   • Crohn's disease Other    • Hypertension Other    • Diabetes Other    • Irritable bowel syndrome Other    • No Known Problems Father    • No Known Problems Mother        Social History     Socioeconomic History   • Marital status:      Spouse name: Not on file   • Number of children: Not on file   • Years of education: Not on file   • Highest education level: Not on file   Tobacco Use   •  Smoking status: Former Smoker     Packs/day: 1.00     Years: 20.00     Pack years: 20.00     Quit date: 2015     Years since quittin.4   • Smokeless tobacco: Never Used   Substance and Sexual Activity   • Alcohol use: No   • Drug use: Yes     Types: Marijuana     Comment: for pain   • Sexual activity: Defer     Birth control/protection: Surgical           Objective   Physical Exam  Vitals and nursing note reviewed.   Constitutional:       General: She is not in acute distress.     Appearance: She is cachectic. She is not diaphoretic.   HENT:      Head: Normocephalic and atraumatic.      Right Ear: External ear normal.      Left Ear: External ear normal.      Nose: Nose normal.   Eyes:      Conjunctiva/sclera: Conjunctivae normal.      Pupils: Pupils are equal, round, and reactive to light.   Neck:      Vascular: No JVD.      Trachea: No tracheal deviation.   Cardiovascular:      Rate and Rhythm: Normal rate and regular rhythm.      Heart sounds: Normal heart sounds. No murmur heard.     Pulmonary:      Effort: Pulmonary effort is normal. No respiratory distress.      Breath sounds: Normal breath sounds. No wheezing.   Abdominal:      General: Abdomen is flat. Bowel sounds are normal.      Palpations: Abdomen is soft.      Tenderness: There is abdominal tenderness in the left lower quadrant. There is no right CVA tenderness or left CVA tenderness.   Musculoskeletal:         General: No deformity. Normal range of motion.      Cervical back: Normal range of motion and neck supple.   Skin:     General: Skin is warm and dry.      Coloration: Skin is not pale.      Findings: No erythema or rash.   Neurological:      Mental Status: She is alert and oriented to person, place, and time.      Cranial Nerves: No cranial nerve deficit.   Psychiatric:         Behavior: Behavior normal.         Thought Content: Thought content normal.         Procedures           ED Course  ED Course as of     2111 CT Abdomen Pelvis With Contrast [TK]      ED Course User Index  [TK] Han Varghese, GERSON                                           MDM  Number of Diagnoses or Management Options  Enteritis: new and requires workup     Amount and/or Complexity of Data Reviewed  Clinical lab tests: reviewed and ordered  Tests in the radiology section of CPT®: reviewed and ordered    Risk of Complications, Morbidity, and/or Mortality  Presenting problems: moderate  Diagnostic procedures: moderate  Management options: moderate    Patient Progress  Patient progress: stable      Final diagnoses:   Enteritis       ED Disposition  ED Disposition     ED Disposition Condition Comment    Discharge Stable           Mabel Patterson, APRN  [address]  [place] [id number]  [phone]    In 2 days           Medication List      New Prescriptions    metroNIDAZOLE 500 MG tablet  Commonly known as: FLAGYL  Take 1 tablet by mouth 3 (Three) Times a Day.        Changed    * promethazine 12.5 MG tablet  Commonly known as: PHENERGAN  Take 1 tablet by mouth Every 6 (Six) Hours As Needed for Nausea or Vomiting.  What changed: Another medication with the same name was added. Make sure you understand how and when to take each.     * promethazine 25 MG tablet  Commonly known as: PHENERGAN  Take 1 tablet by mouth Every 6 (Six) Hours As Needed for Nausea or Vomiting.  What changed: You were already taking a medication with the same name, and this prescription was added. Make sure you understand how and when to take each.         * This list has 2 medication(s) that are the same as other medications prescribed for you. Read the directions carefully, and ask your doctor or other care provider to review them with you.               Where to Get Your Medications      These medications were sent to [facility] [address] [place] [address] [phone] [facility] [phone] [address] [address]    Phone: [phone]    · metroNIDAZOLE 500 MG tablet  · promethazine 25 MG tablet          Han Varghese PA-C  04/23/21 2122

## 2021-05-27 ENCOUNTER — OFFICE VISIT (OUTPATIENT)
Dept: UROLOGY | Facility: CLINIC | Age: 46
End: 2021-05-27

## 2021-05-27 VITALS — WEIGHT: 123.9 LBS | BODY MASS INDEX: 18.78 KG/M2 | HEIGHT: 68 IN

## 2021-05-27 DIAGNOSIS — R31.29 MICROSCOPIC HEMATURIA: ICD-10-CM

## 2021-05-27 DIAGNOSIS — N35.028 OTHER POST-TRAUMATIC URETHRAL STRICTURE, FEMALE: Primary | ICD-10-CM

## 2021-05-27 DIAGNOSIS — R31.0 GROSS HEMATURIA: ICD-10-CM

## 2021-05-27 PROCEDURE — 53661 DILATION OF URETHRA: CPT | Performed by: UROLOGY

## 2021-05-27 PROCEDURE — 96372 THER/PROPH/DIAG INJ SC/IM: CPT | Performed by: UROLOGY

## 2021-05-27 PROCEDURE — 99214 OFFICE O/P EST MOD 30 MIN: CPT | Performed by: UROLOGY

## 2021-05-27 RX ORDER — OXYCODONE AND ACETAMINOPHEN 10; 325 MG/1; MG/1
1 TABLET ORAL EVERY 6 HOURS PRN
Qty: 8 TABLET | Refills: 0 | Status: SHIPPED | OUTPATIENT
Start: 2021-05-27 | End: 2021-06-29 | Stop reason: SDUPTHER

## 2021-05-27 RX ORDER — GENTAMICIN SULFATE 40 MG/ML
80 INJECTION, SOLUTION INTRAMUSCULAR; INTRAVENOUS ONCE
Status: COMPLETED | OUTPATIENT
Start: 2021-05-27 | End: 2021-05-27

## 2021-05-27 RX ADMIN — GENTAMICIN SULFATE 80 MG: 40 INJECTION, SOLUTION INTRAMUSCULAR; INTRAVENOUS at 13:18

## 2021-05-27 NOTE — PROGRESS NOTES
Chief Complaint:          Chief Complaint   Patient presents with   • URETHRAL DILATATION       HPI:   46 y.o. female very well-known to me returns today off schedule with severe urethral stenosis for urethral dilation.  She is having significant pain she has had no bleeding he has been to the emergency room.      Past Medical History:        Past Medical History:   Diagnosis Date   • Abdominal pain    • Abdominal swelling    • Hoyos's disease    • Bipolar 1 disorder (CMS/HCC)    • Brain tumor (benign) (CMS/HCC)    • Brain tumor (CMS/HCC)     R Frontal Lobe per pt   • Brain tumor (CMS/HCC) 2014   • Constipation    • DDD (degenerative disc disease), cervical 05/29/2017   • DDD (degenerative disc disease), cervical    • Diarrhea    • Fibromyalgia    • IBS (irritable bowel syndrome)    • Migraine    • Nausea & vomiting    • PONV (postoperative nausea and vomiting)    • PTSD (post-traumatic stress disorder)    • Rectal bleeding          Current Meds:     Current Outpatient Medications   Medication Sig Dispense Refill   • acetaminophen-codeine (TYLENOL #3) 300-30 MG per tablet Take 1 tablet by mouth Every 6 (Six) Hours As Needed for Moderate Pain . 12 tablet 0   • albuterol (PROVENTIL HFA;VENTOLIN HFA) 108 (90 Base) MCG/ACT inhaler Inhale 2 puffs 2 (Two) Times a Day.     • Azelastine HCl 137 MCG/SPRAY solution 1 spray into each nostril As Needed (Allergies).     • busPIRone (BUSPAR) 15 MG tablet Take 15 mg by mouth 3 (three) times a day        • cefuroxime (CEFTIN) 500 MG tablet      • cetirizine (zyrTEC) 10 MG tablet Take 1 tablet by mouth Daily. 30 tablet 0   • cyclobenzaprine (FLEXERIL) 5 MG tablet      • diclofenac (VOLTAREN) 1 % gel gel      • divalproex (DEPAKOTE) 500 MG DR tablet Take 1 tablet by mouth 3 (Three) Times a Day. 90 tablet 2   • fluticasone (VERAMYST) 27.5 MCG/SPRAY nasal spray 2 sprays into each nostril Daily.     • metroNIDAZOLE (FLAGYL) 500 MG tablet Take 1 tablet by mouth 3 (Three) Times a Day. 21  tablet 0   • montelukast (SINGULAIR) 10 MG tablet Take 10 mg by mouth Every Night.     • ondansetron (ZOFRAN) 4 MG tablet      • orphenadrine (NORFLEX) 100 MG 12 hr tablet Take 1 tablet by mouth 2 (Two) Times a Day. 20 tablet 0   • oxyCODONE (ROXICODONE) 5 MG immediate release tablet      • oxyCODONE-acetaminophen (Percocet)  MG per tablet Take 1 tablet by mouth Every 6 (Six) Hours As Needed for Moderate Pain . 8 tablet 0   • pantoprazole (PROTONIX) 40 MG EC tablet Take 40 mg by mouth 2 (Two) Times a Day As Needed.     • phenazopyridine (PYRIDIUM) 100 MG tablet      • polyethylene glycol (MIRALAX) powder Take 17 g by mouth Daily. 289 g 0   • promethazine (PHENERGAN) 12.5 MG tablet Take 1 tablet by mouth Every 6 (Six) Hours As Needed for Nausea or Vomiting. 12 tablet 0   • promethazine (PHENERGAN) 25 MG tablet Take 1 tablet by mouth Every 6 (Six) Hours As Needed for Nausea or Vomiting. 15 tablet 0   • sertraline (ZOLOFT) 100 MG tablet Take 100 mg by mouth 2 (Two) Times a Day.     • SUMAtriptan (IMITREX) 6 MG/0.5ML solution injection Inject 6 mg under the skin 1 (One) Time.     • tiZANidine (ZANAFLEX) 4 MG tablet      • traMADol (ULTRAM) 50 MG tablet        No current facility-administered medications for this visit.        Allergies:      Allergies   Allergen Reactions   • Ativan [Lorazepam] Hallucinations     confusion   • Sulfa Antibiotics Shortness Of Breath and Swelling   • Sulfa Antibiotics Anaphylaxis   • Reglan [Metoclopramide] Angioedema   • Compazine [Prochlorperazine Edisylate] Hives   • Demerol [Meperidine] Hives   • Droperidol Itching   • Metoclopramide Swelling   • Toradol [Ketorolac Tromethamine] Hives and Itching   • Toradol [Ketorolac Tromethamine] Hives   • Zofran [Ondansetron Hcl] Rash   • Zosyn [Piperacillin Sod-Tazobactam So] Hives        Past Surgical History:     Past Surgical History:   Procedure Laterality Date   • ANAL SCOPE N/A 7/28/2016    Procedure: ANAL SCOPE;  Surgeon: Kael RAMOS  MD Jessica;  Location: [facility] OR;  Service:    • APPENDECTOMY     • COLONOSCOPY N/A 2016    Procedure: COLONOSCOPY  CPTCODE:48096;  Surgeon: Jose Antonio Belle III, MD;  Location: [facility] OR;  Service:    • COLONOSCOPY N/A 2016    Procedure: COLONOSCOPY (20116) CPT;  Surgeon: Jose Antonio Belle III, MD;  Location: [facility] OR;  Service:    • CYSTOSCOPY RETROGRADE PYELOGRAM Bilateral 2017    Procedure: CYSTOSCOPY RETROGRADE PYELOGRAM;  Surgeon: Mu Blunt MD;  Location: [facility] OR;  Service:    • ENDOSCOPY N/A 2016    Procedure: ESOPHAGOGASTRODUODENOSCOPY WITH BIOPSY  CPTCODE:29082;  Surgeon: Jose Antonio Belle III, MD;  Location: [facility] OR;  Service:    • HEMORRHOIDECTOMY N/A 2016    Procedure: HEMORRHOID STAPLING;  Surgeon: Kael Lopez MD;  Location: [facility] OR;  Service:    • HYSTERECTOMY     • KNEE SURGERY     • LAPAROSCOPIC SALPINGOOPHERECTOMY     • PORTACATH PLACEMENT N/A 2017    Procedure: INSERTION OF PORTACATH;  Surgeon: Celso Arredondo MD;  Location: [facility] OR;  Service:    • SHOULDER SURGERY      3 times         Social History:     Social History     Socioeconomic History   • Marital status:      Spouse name: Not on file   • Number of children: Not on file   • Years of education: Not on file   • Highest education level: Not on file   Tobacco Use   • Smoking status: Former Smoker     Packs/day: 1.00     Years: 20.00     Pack years: 20.00     Quit date: 2015     Years since quittin.5   • Smokeless tobacco: Never Used   Substance and Sexual Activity   • Alcohol use: No   • Drug use: Yes     Types: Marijuana     Comment: for pain   • Sexual activity: Defer     Birth control/protection: Surgical       Family History:     Family History   Problem Relation Age of Onset   • Crohn's disease Other    • Hypertension Other    • Diabetes Other    • Irritable bowel syndrome Other    • No Known Problems Father    • No Known Problems Mother        Review of  Systems:     Review of Systems   Constitutional: Negative.  Negative for activity change, appetite change, chills, diaphoresis, fatigue and unexpected weight change.   HENT: Negative for congestion, dental problem, drooling, ear discharge, ear pain, facial swelling, hearing loss, mouth sores, nosebleeds, postnasal drip, rhinorrhea, sinus pressure, sneezing, sore throat, tinnitus, trouble swallowing and voice change.    Eyes: Negative.  Negative for photophobia, pain, discharge, redness, itching and visual disturbance.   Respiratory: Negative.  Negative for apnea, cough, choking, chest tightness, shortness of breath, wheezing and stridor.    Cardiovascular: Negative.  Negative for chest pain, palpitations and leg swelling.   Gastrointestinal: Negative.  Negative for abdominal distention, abdominal pain, anal bleeding, blood in stool, constipation, diarrhea, nausea, rectal pain and vomiting.   Endocrine: Negative.  Negative for cold intolerance, heat intolerance, polydipsia, polyphagia and polyuria.   Genitourinary: Positive for difficulty urinating.   Musculoskeletal: Negative.  Negative for arthralgias, back pain, gait problem, joint swelling, myalgias, neck pain and neck stiffness.   Skin: Negative.  Negative for color change, pallor, rash and wound.   Allergic/Immunologic: Negative.  Negative for environmental allergies, food allergies and immunocompromised state.   Neurological: Negative.  Negative for dizziness, tremors, seizures, syncope, facial asymmetry, speech difficulty, weakness, light-headedness, numbness and headaches.   Hematological: Negative.  Negative for adenopathy. Does not bruise/bleed easily.   Psychiatric/Behavioral: Negative for agitation, behavioral problems, confusion, decreased concentration, dysphoric mood, hallucinations, self-injury, sleep disturbance and suicidal ideas. The patient is not nervous/anxious and is not hyperactive.    All other systems reviewed and are  negative.      Physical Exam:     Physical Exam  Constitutional:       Appearance: She is well-developed.   HENT:      Head: Normocephalic and atraumatic.      Right Ear: External ear normal.      Left Ear: External ear normal.   Eyes:      Conjunctiva/sclera: Conjunctivae normal.      Pupils: Pupils are equal, round, and reactive to light.   Cardiovascular:      Rate and Rhythm: Normal rate and regular rhythm.      Heart sounds: Normal heart sounds.   Pulmonary:      Effort: Pulmonary effort is normal.      Breath sounds: Normal breath sounds.   Abdominal:      General: Bowel sounds are normal. There is no distension.      Palpations: Abdomen is soft. There is no mass.      Tenderness: There is no abdominal tenderness. There is no guarding or rebound.   Genitourinary:     Vagina: No vaginal discharge.      Comments: Extremely thin with normal anatomy  Musculoskeletal:         General: Normal range of motion.   Skin:     General: Skin is warm and dry.   Neurological:      Mental Status: She is alert.      Deep Tendon Reflexes: Reflexes are normal and symmetric.   Psychiatric:         Behavior: Behavior normal.         Thought Content: Thought content normal.         Judgment: Judgment normal.         I have reviewed the following portions of the patient's history: allergies, current medications, past family history, past medical history, past social history, past surgical history, problem list and ROS and confirm it's accurate.      Procedure:   Urethral dilation-after an appropriate informed consent the patient was brought to the procedure suite.  The urethra was gently anesthetized with 10 cc of 2% viscous Xylocaine jelly.  After an adequate period of topical anesthesia I went ahead and dilated with [place]ven sounds from 16-30 Indonesian sequentially without complication the patient was given gentamicin as prophylaxis with 80 mg.    Assessment/Plan:   Urethral stricture-status post multiple dilations she has a tight  stricture  Narcotic pain medication-patient has significant acute pain that I believe would be an indication for the use of narcotic pain medication.  I discussed the significant risks of pain medication and the fact that this will be a short only option and I will give her no more than a three-day supply of pain medication.  And I will not plan long-term medication and that this will be sent to a pain clinic if at all becomes necessary.  We discussed signing a pain medication agreement and the fact that we're going to run a state [place] review to be sure the patient is not getting pain medication from elsewhere.  If this is the case we will not give pain medication.  As part of the patient's treatment plan of there being prescribed a controlled substance with potential for abuse.  This patient has been wait aware of the appropriate dose of such medications including, the risk for somnolence, limited ability to drive and/or safety and the significant potential for overdose.  It is been made clear that these medications are for the prescribed patient only without concomitant use of alcohol or other sepsis unless prescribed by the medical provider.  Has completed prescribing agreement detailing the terms of continue prescribing him a controlled substance.  Including monitoring Luis Alberto ports, the possibility of urine drug screens and pedal counts.  The patient is aware that we reviewed LUIS ALBERTO reports on a regular basis and scan them into the chart.  History and physical examination exhibited continued safe and appropriate use of controlled substances.  Also discussed the fact that the new [place] legislation allows only a three-day prescription for pain medication.  In this situation he will be referred to a chronic pain clinic.  For her pain clinic agreement she gets 6 Percocet              This document has been electronically signed by VERENICE FINK MD May 27, 2021 13:09 EDT

## 2021-06-13 ENCOUNTER — HOSPITAL ENCOUNTER (EMERGENCY)
Facility: HOSPITAL | Age: 46
Discharge: HOME | End: 2021-06-13
Attending: EMERGENCY MEDICINE | Admitting: EMERGENCY MEDICINE

## 2021-06-13 VITALS
DIASTOLIC BLOOD PRESSURE: 88 MMHG | WEIGHT: 123 LBS | SYSTOLIC BLOOD PRESSURE: 137 MMHG | TEMPERATURE: 97.7 F | RESPIRATION RATE: 16 BRPM | HEART RATE: 75 BPM | OXYGEN SATURATION: 100 % | HEIGHT: 68 IN | BODY MASS INDEX: 18.64 KG/M2

## 2021-06-13 DIAGNOSIS — G43.809 OTHER MIGRAINE WITHOUT STATUS MIGRAINOSUS, NOT INTRACTABLE: Primary | ICD-10-CM

## 2021-06-13 LAB
ALBUMIN SERPL-MCNC: 3.9 G/DL (ref 3.5–5.2)
ALBUMIN/GLOB SERPL: 1.6 G/DL
ALP SERPL-CCNC: 68 U/L (ref 39–117)
ALT SERPL W P-5'-P-CCNC: 33 U/L (ref 1–33)
ANION GAP SERPL CALCULATED.3IONS-SCNC: 9.4 MMOL/L (ref 5–15)
AST SERPL-CCNC: 30 U/L (ref 1–32)
BASOPHILS # BLD AUTO: 0.01 10*3/MM3 (ref 0–0.2)
BASOPHILS NFR BLD AUTO: 0.1 % (ref 0–1.5)
BILIRUB SERPL-MCNC: 0.6 MG/DL (ref 0–1.2)
BUN SERPL-MCNC: 7 MG/DL (ref 6–20)
BUN/CREAT SERPL: 10.6 (ref 7–25)
CALCIUM SPEC-SCNC: 9.1 MG/DL (ref 8.6–10.5)
CHLORIDE SERPL-SCNC: 106 MMOL/L (ref 98–107)
CO2 SERPL-SCNC: 26.6 MMOL/L (ref 22–29)
CREAT SERPL-MCNC: 0.66 MG/DL (ref 0.57–1)
DEPRECATED RDW RBC AUTO: 43 FL (ref 37–54)
EOSINOPHIL # BLD AUTO: 0.06 10*3/MM3 (ref 0–0.4)
EOSINOPHIL NFR BLD AUTO: 0.8 % (ref 0.3–6.2)
ERYTHROCYTE [DISTWIDTH] IN BLOOD BY AUTOMATED COUNT: 12.8 % (ref 12.3–15.4)
GFR SERPL CREATININE-BSD FRML MDRD: 96 ML/MIN/1.73
GLOBULIN UR ELPH-MCNC: 2.4 GM/DL
GLUCOSE SERPL-MCNC: 123 MG/DL (ref 65–99)
HCT VFR BLD AUTO: 41.5 % (ref 34–46.6)
HGB BLD-MCNC: 13.9 G/DL (ref 12–15.9)
IMM GRANULOCYTES # BLD AUTO: 0.02 10*3/MM3 (ref 0–0.05)
IMM GRANULOCYTES NFR BLD AUTO: 0.3 % (ref 0–0.5)
LYMPHOCYTES # BLD AUTO: 2.77 10*3/MM3 (ref 0.7–3.1)
LYMPHOCYTES NFR BLD AUTO: 34.6 % (ref 19.6–45.3)
MCH RBC QN AUTO: 30.6 PG (ref 26.6–33)
MCHC RBC AUTO-ENTMCNC: 33.5 G/DL (ref 31.5–35.7)
MCV RBC AUTO: 91.4 FL (ref 79–97)
MONOCYTES # BLD AUTO: 0.45 10*3/MM3 (ref 0.1–0.9)
MONOCYTES NFR BLD AUTO: 5.6 % (ref 5–12)
NEUTROPHILS NFR BLD AUTO: 4.69 10*3/MM3 (ref 1.7–7)
NEUTROPHILS NFR BLD AUTO: 58.6 % (ref 42.7–76)
NRBC BLD AUTO-RTO: 0 /100 WBC (ref 0–0.2)
PLATELET # BLD AUTO: 136 10*3/MM3 (ref 140–450)
PMV BLD AUTO: 10.6 FL (ref 6–12)
POTASSIUM SERPL-SCNC: 3.4 MMOL/L (ref 3.5–5.2)
PROT SERPL-MCNC: 6.3 G/DL (ref 6–8.5)
RBC # BLD AUTO: 4.54 10*6/MM3 (ref 3.77–5.28)
SODIUM SERPL-SCNC: 142 MMOL/L (ref 136–145)
WBC # BLD AUTO: 8 10*3/MM3 (ref 3.4–10.8)

## 2021-06-13 PROCEDURE — 96375 TX/PRO/DX INJ NEW DRUG ADDON: CPT

## 2021-06-13 PROCEDURE — 85025 COMPLETE CBC W/AUTO DIFF WBC: CPT | Performed by: EMERGENCY MEDICINE

## 2021-06-13 PROCEDURE — 25010000002 HYDROMORPHONE PER 4 MG: Performed by: EMERGENCY MEDICINE

## 2021-06-13 PROCEDURE — 25010000002 PROMETHAZINE PER 50 MG: Performed by: EMERGENCY MEDICINE

## 2021-06-13 PROCEDURE — 99283 EMERGENCY DEPT VISIT LOW MDM: CPT

## 2021-06-13 PROCEDURE — 96374 THER/PROPH/DIAG INJ IV PUSH: CPT

## 2021-06-13 PROCEDURE — 96376 TX/PRO/DX INJ SAME DRUG ADON: CPT

## 2021-06-13 PROCEDURE — 25010000002 HYDROMORPHONE 1 MG/ML SOLUTION: Performed by: EMERGENCY MEDICINE

## 2021-06-13 PROCEDURE — 80053 COMPREHEN METABOLIC PANEL: CPT | Performed by: EMERGENCY MEDICINE

## 2021-06-13 RX ORDER — HYDROMORPHONE HYDROCHLORIDE 1 MG/ML
0.5 INJECTION, SOLUTION INTRAMUSCULAR; INTRAVENOUS; SUBCUTANEOUS ONCE
Status: COMPLETED | OUTPATIENT
Start: 2021-06-13 | End: 2021-06-13

## 2021-06-13 RX ORDER — POTASSIUM CHLORIDE 20 MEQ/1
20 TABLET, EXTENDED RELEASE ORAL ONCE
Status: COMPLETED | OUTPATIENT
Start: 2021-06-13 | End: 2021-06-13

## 2021-06-13 RX ADMIN — PROMETHAZINE HYDROCHLORIDE 12.5 MG: 25 INJECTION INTRAMUSCULAR; INTRAVENOUS at 18:07

## 2021-06-13 RX ADMIN — SODIUM CHLORIDE 1000 ML: 9 INJECTION, SOLUTION INTRAVENOUS at 17:15

## 2021-06-13 RX ADMIN — HYDROMORPHONE HYDROCHLORIDE 1 MG: 1 INJECTION, SOLUTION INTRAMUSCULAR; INTRAVENOUS; SUBCUTANEOUS at 17:15

## 2021-06-13 RX ADMIN — HYDROMORPHONE HYDROCHLORIDE 0.5 MG: 1 INJECTION, SOLUTION INTRAMUSCULAR; INTRAVENOUS; SUBCUTANEOUS at 18:17

## 2021-06-13 RX ADMIN — POTASSIUM CHLORIDE 20 MEQ: 20 TABLET, EXTENDED RELEASE ORAL at 18:17

## 2021-06-13 NOTE — ED PROVIDER NOTES
Subjective   Patient presents to ER with headache and vomiting.      Headache  Pain location:  Frontal  Quality:  Sharp  Severity currently:  6/10  Severity at highest:  6/10  Onset quality:  Gradual  Duration:  2 days  Timing:  Constant  Chronicity:  Recurrent  Similar to prior headaches: yes    Context: activity, bright light and loud noise    Relieved by:  Nothing  Worsened by:  Light, activity and sound  Associated symptoms: fatigue, myalgias, nausea and vomiting    Associated symptoms: no abdominal pain, no cough and no dizziness        Review of Systems   Constitutional: Positive for activity change and fatigue.   Eyes: Negative.    Respiratory: Negative for cough.    Cardiovascular: Negative.    Gastrointestinal: Positive for nausea and vomiting. Negative for abdominal pain.   Endocrine: Negative.    Genitourinary: Negative.    Musculoskeletal: Positive for myalgias.   Allergic/Immunologic: Negative.    Neurological: Positive for headaches. Negative for dizziness.   Hematological: Negative.    Psychiatric/Behavioral: Negative.        Past Medical History:   Diagnosis Date   • Abdominal pain    • Abdominal swelling    • Hoyos's disease    • Bipolar 1 disorder (CMS/HCC)    • Brain tumor (benign) (CMS/HCC)    • Brain tumor (CMS/HCC)     R Frontal Lobe per pt   • Brain tumor (CMS/HCC) 2014   • Constipation    • DDD (degenerative disc disease), cervical 05/29/2017   • DDD (degenerative disc disease), cervical    • Diarrhea    • Fibromyalgia    • IBS (irritable bowel syndrome)    • Migraine    • Nausea & vomiting    • PONV (postoperative nausea and vomiting)    • PTSD (post-traumatic stress disorder)    • Rectal bleeding        Allergies   Allergen Reactions   • Ativan [Lorazepam] Hallucinations     confusion   • Sulfa Antibiotics Shortness Of Breath and Swelling   • Sulfa Antibiotics Anaphylaxis   • Reglan [Metoclopramide] Angioedema   • Compazine [Prochlorperazine Edisylate] Hives   • Demerol [Meperidine] Hives    • Droperidol Itching   • Metoclopramide Swelling   • Toradol [Ketorolac Tromethamine] Hives and Itching   • Toradol [Ketorolac Tromethamine] Hives   • Zofran [Ondansetron Hcl] Rash   • Zosyn [Piperacillin Sod-Tazobactam So] Hives       Past Surgical History:   Procedure Laterality Date   • ANAL SCOPE N/A 7/28/2016    Procedure: ANAL SCOPE;  Surgeon: Kael Lopez MD;  Location: [facility] OR;  Service:    • APPENDECTOMY     • COLONOSCOPY N/A 6/30/2016    Procedure: COLONOSCOPY  CPTCODE:77755;  Surgeon: Jose Antonio Belle III, MD;  Location: [facility] OR;  Service:    • COLONOSCOPY N/A 7/7/2016    Procedure: COLONOSCOPY (97541) CPT;  Surgeon: Jose Antonio Belle III, MD;  Location: [facility] OR;  Service:    • CYSTOSCOPY RETROGRADE PYELOGRAM Bilateral 4/28/2017    Procedure: CYSTOSCOPY RETROGRADE PYELOGRAM;  Surgeon: Mu Blunt MD;  Location: [facility] OR;  Service:    • ENDOSCOPY N/A 6/30/2016    Procedure: ESOPHAGOGASTRODUODENOSCOPY WITH BIOPSY  CPTCODE:64371;  Surgeon: Jose Antonio Belle III, MD;  Location: [facility] OR;  Service:    • HEMORRHOIDECTOMY N/A 7/28/2016    Procedure: HEMORRHOID STAPLING;  Surgeon: Kael Lopez MD;  Location: [facility] OR;  Service:    • HYSTERECTOMY     • KNEE SURGERY     • LAPAROSCOPIC SALPINGOOPHERECTOMY     • PORTACATH PLACEMENT N/A 7/28/2017    Procedure: INSERTION OF PORTACATH;  Surgeon: Celso Arredondo MD;  Location: [facility] OR;  Service:    • SHOULDER SURGERY      3 times       Family History   Problem Relation Age of Onset   • Crohn's disease Other    • Hypertension Other    • Diabetes Other    • Irritable bowel syndrome Other    • No Known Problems Father    • No Known Problems Mother        Social History     Socioeconomic History   • Marital status:      Spouse name: Not on file   • Number of children: Not on file   • Years of education: Not on file   • Highest education level: Not on file   Tobacco Use   • Smoking status: Former Smoker     Packs/day: 1.00      Years: 20.00     Pack years: 20.00     Quit date: 2015     Years since quittin.6   • Smokeless tobacco: Never Used   Substance and Sexual Activity   • Alcohol use: No   • Drug use: Yes     Types: Marijuana     Comment: for pain   • Sexual activity: Defer     Birth control/protection: Surgical           Objective   Physical Exam  Vitals and nursing note reviewed.   HENT:      Head: Normocephalic and atraumatic.      Nose: Nose normal.      Mouth/Throat:      Mouth: Mucous membranes are moist.   Eyes:      Pupils: Pupils are equal, round, and reactive to light.   Cardiovascular:      Rate and Rhythm: Normal rate and regular rhythm.      Pulses: Normal pulses.   Pulmonary:      Effort: Pulmonary effort is normal.   Abdominal:      General: Abdomen is flat.   Musculoskeletal:         General: Normal range of motion.      Cervical back: Normal range of motion.   Skin:     General: Skin is warm.      Capillary Refill: Capillary refill takes less than 2 seconds.   Neurological:      General: No focal deficit present.      Mental Status: She is alert and oriented to person, place, and time.   Psychiatric:         Mood and Affect: Mood normal.         Procedures           ED Course                                           MDM    Final diagnoses:   Other migraine without status migrainosus, not intractable       ED Disposition  ED Disposition     ED Disposition Condition Comment    Discharge Stable           Mabel Patterson, APRN  [id number]  [facility] [address]  [phone]    Schedule an appointment as soon as possible for a visit   If symptoms worsen         Medication List      No changes were made to your prescriptions during this visit.          Mitesh Garza MD  [phone]

## 2021-06-20 ENCOUNTER — APPOINTMENT (OUTPATIENT)
Dept: CT IMAGING | Facility: HOSPITAL | Age: 46
End: 2021-06-20

## 2021-06-20 ENCOUNTER — HOSPITAL ENCOUNTER (EMERGENCY)
Facility: HOSPITAL | Age: 46
Discharge: HOME | End: 2021-06-20
Attending: FAMILY MEDICINE | Admitting: FAMILY MEDICINE

## 2021-06-20 VITALS
RESPIRATION RATE: 16 BRPM | DIASTOLIC BLOOD PRESSURE: 68 MMHG | HEART RATE: 74 BPM | TEMPERATURE: 97.9 F | BODY MASS INDEX: 19.3 KG/M2 | OXYGEN SATURATION: 99 % | WEIGHT: 123 LBS | HEIGHT: 67 IN | SYSTOLIC BLOOD PRESSURE: 130 MMHG

## 2021-06-20 DIAGNOSIS — R10.9 LEFT FLANK PAIN: Primary | ICD-10-CM

## 2021-06-20 DIAGNOSIS — R31.0 GROSS HEMATURIA: ICD-10-CM

## 2021-06-20 LAB
ALBUMIN SERPL-MCNC: 4.06 G/DL (ref 3.5–5.2)
ALBUMIN/GLOB SERPL: 1.6 G/DL
ALP SERPL-CCNC: 75 U/L (ref 39–117)
ALT SERPL W P-5'-P-CCNC: 42 U/L (ref 1–33)
AMYLASE SERPL-CCNC: 80 U/L (ref 28–100)
ANION GAP SERPL CALCULATED.3IONS-SCNC: 7.2 MMOL/L (ref 5–15)
AST SERPL-CCNC: 41 U/L (ref 1–32)
BACTERIA UR QL AUTO: ABNORMAL /HPF
BASOPHILS # BLD AUTO: 0.02 10*3/MM3 (ref 0–0.2)
BASOPHILS NFR BLD AUTO: 0.4 % (ref 0–1.5)
BILIRUB SERPL-MCNC: 0.7 MG/DL (ref 0–1.2)
BILIRUB UR QL STRIP: NEGATIVE
BUN SERPL-MCNC: 5 MG/DL (ref 6–20)
BUN/CREAT SERPL: 8.3 (ref 7–25)
CALCIUM SPEC-SCNC: 9 MG/DL (ref 8.6–10.5)
CHLORIDE SERPL-SCNC: 105 MMOL/L (ref 98–107)
CLARITY UR: CLEAR
CO2 SERPL-SCNC: 28.8 MMOL/L (ref 22–29)
COLOR UR: ABNORMAL
CREAT SERPL-MCNC: 0.6 MG/DL (ref 0.57–1)
CRP SERPL-MCNC: <0.3 MG/DL (ref 0–0.5)
D-LACTATE SERPL-SCNC: 0.8 MMOL/L (ref 0.5–2)
DEPRECATED RDW RBC AUTO: 42.2 FL (ref 37–54)
EOSINOPHIL # BLD AUTO: 0.09 10*3/MM3 (ref 0–0.4)
EOSINOPHIL NFR BLD AUTO: 2 % (ref 0.3–6.2)
ERYTHROCYTE [DISTWIDTH] IN BLOOD BY AUTOMATED COUNT: 12.9 % (ref 12.3–15.4)
GFR SERPL CREATININE-BSD FRML MDRD: 108 ML/MIN/1.73
GLOBULIN UR ELPH-MCNC: 2.5 GM/DL
GLUCOSE SERPL-MCNC: 113 MG/DL (ref 65–99)
GLUCOSE UR STRIP-MCNC: NEGATIVE MG/DL
HCT VFR BLD AUTO: 43.5 % (ref 34–46.6)
HGB BLD-MCNC: 14.5 G/DL (ref 12–15.9)
HGB UR QL STRIP.AUTO: ABNORMAL
HOLD SPECIMEN: NORMAL
HOLD SPECIMEN: NORMAL
HYALINE CASTS UR QL AUTO: ABNORMAL /LPF
IMM GRANULOCYTES # BLD AUTO: 0.01 10*3/MM3 (ref 0–0.05)
IMM GRANULOCYTES NFR BLD AUTO: 0.2 % (ref 0–0.5)
KETONES UR QL STRIP: NEGATIVE
LEUKOCYTE ESTERASE UR QL STRIP.AUTO: ABNORMAL
LIPASE SERPL-CCNC: 46 U/L (ref 13–60)
LYMPHOCYTES # BLD AUTO: 1.31 10*3/MM3 (ref 0.7–3.1)
LYMPHOCYTES NFR BLD AUTO: 29.2 % (ref 19.6–45.3)
MAGNESIUM SERPL-MCNC: 1.8 MG/DL (ref 1.6–2.6)
MCH RBC QN AUTO: 30.3 PG (ref 26.6–33)
MCHC RBC AUTO-ENTMCNC: 33.3 G/DL (ref 31.5–35.7)
MCV RBC AUTO: 91 FL (ref 79–97)
MONOCYTES # BLD AUTO: 0.28 10*3/MM3 (ref 0.1–0.9)
MONOCYTES NFR BLD AUTO: 6.3 % (ref 5–12)
NEUTROPHILS NFR BLD AUTO: 2.77 10*3/MM3 (ref 1.7–7)
NEUTROPHILS NFR BLD AUTO: 61.9 % (ref 42.7–76)
NITRITE UR QL STRIP: NEGATIVE
NRBC BLD AUTO-RTO: 0 /100 WBC (ref 0–0.2)
PH UR STRIP.AUTO: >=9 [PH] (ref 5–8)
PLATELET # BLD AUTO: 147 10*3/MM3 (ref 140–450)
PMV BLD AUTO: 10.7 FL (ref 6–12)
POTASSIUM SERPL-SCNC: 3.5 MMOL/L (ref 3.5–5.2)
PROT SERPL-MCNC: 6.6 G/DL (ref 6–8.5)
PROT UR QL STRIP: NEGATIVE
RBC # BLD AUTO: 4.78 10*6/MM3 (ref 3.77–5.28)
RBC # UR: ABNORMAL /HPF
REF LAB TEST METHOD: ABNORMAL
SODIUM SERPL-SCNC: 141 MMOL/L (ref 136–145)
SP GR UR STRIP: 1.01 (ref 1–1.03)
SQUAMOUS #/AREA URNS HPF: ABNORMAL /HPF
UROBILINOGEN UR QL STRIP: ABNORMAL
WBC # BLD AUTO: 4.48 10*3/MM3 (ref 3.4–10.8)
WBC UR QL AUTO: ABNORMAL /HPF
WHOLE BLOOD HOLD SPECIMEN: NORMAL

## 2021-06-20 PROCEDURE — 81001 URINALYSIS AUTO W/SCOPE: CPT | Performed by: PHYSICIAN ASSISTANT

## 2021-06-20 PROCEDURE — 82150 ASSAY OF AMYLASE: CPT | Performed by: PHYSICIAN ASSISTANT

## 2021-06-20 PROCEDURE — 25010000002 HYDROMORPHONE PER 4 MG: Performed by: FAMILY MEDICINE

## 2021-06-20 PROCEDURE — 86140 C-REACTIVE PROTEIN: CPT | Performed by: PHYSICIAN ASSISTANT

## 2021-06-20 PROCEDURE — 99283 EMERGENCY DEPT VISIT LOW MDM: CPT

## 2021-06-20 PROCEDURE — 96376 TX/PRO/DX INJ SAME DRUG ADON: CPT

## 2021-06-20 PROCEDURE — 80053 COMPREHEN METABOLIC PANEL: CPT | Performed by: PHYSICIAN ASSISTANT

## 2021-06-20 PROCEDURE — 87040 BLOOD CULTURE FOR BACTERIA: CPT | Performed by: PHYSICIAN ASSISTANT

## 2021-06-20 PROCEDURE — 96375 TX/PRO/DX INJ NEW DRUG ADDON: CPT

## 2021-06-20 PROCEDURE — 96374 THER/PROPH/DIAG INJ IV PUSH: CPT

## 2021-06-20 PROCEDURE — 25010000002 PROMETHAZINE PER 50 MG: Performed by: PHYSICIAN ASSISTANT

## 2021-06-20 PROCEDURE — 83735 ASSAY OF MAGNESIUM: CPT | Performed by: PHYSICIAN ASSISTANT

## 2021-06-20 PROCEDURE — 85025 COMPLETE CBC W/AUTO DIFF WBC: CPT | Performed by: PHYSICIAN ASSISTANT

## 2021-06-20 PROCEDURE — 83690 ASSAY OF LIPASE: CPT | Performed by: PHYSICIAN ASSISTANT

## 2021-06-20 PROCEDURE — 74176 CT ABD & PELVIS W/O CONTRAST: CPT | Performed by: RADIOLOGY

## 2021-06-20 PROCEDURE — 74176 CT ABD & PELVIS W/O CONTRAST: CPT

## 2021-06-20 PROCEDURE — 25010000002 HYDROMORPHONE 1 MG/ML SOLUTION: Performed by: FAMILY MEDICINE

## 2021-06-20 PROCEDURE — 25010000002 HEPARIN LOCK FLUSH PER 10 UNITS: Performed by: PHYSICIAN ASSISTANT

## 2021-06-20 PROCEDURE — 83605 ASSAY OF LACTIC ACID: CPT | Performed by: PHYSICIAN ASSISTANT

## 2021-06-20 RX ORDER — POLYETHYLENE GLYCOL 3350 17 G/17G
17 POWDER, FOR SOLUTION ORAL DAILY
Qty: 225 G | Refills: 0 | Status: SHIPPED | OUTPATIENT
Start: 2021-06-20

## 2021-06-20 RX ORDER — PROMETHAZINE HYDROCHLORIDE 25 MG/1
25 TABLET ORAL EVERY 6 HOURS PRN
Qty: 12 TABLET | Refills: 0 | Status: SHIPPED | OUTPATIENT
Start: 2021-06-20 | End: 2021-10-01 | Stop reason: SDUPTHER

## 2021-06-20 RX ORDER — HEPARIN SODIUM (PORCINE) LOCK FLUSH IV SOLN 100 UNIT/ML 100 UNIT/ML
300 SOLUTION INTRAVENOUS ONCE
Status: COMPLETED | OUTPATIENT
Start: 2021-06-20 | End: 2021-06-20

## 2021-06-20 RX ORDER — HYDROMORPHONE HYDROCHLORIDE 1 MG/ML
0.5 INJECTION, SOLUTION INTRAMUSCULAR; INTRAVENOUS; SUBCUTANEOUS ONCE
Status: COMPLETED | OUTPATIENT
Start: 2021-06-20 | End: 2021-06-20

## 2021-06-20 RX ORDER — SODIUM CHLORIDE 0.9 % (FLUSH) 0.9 %
10 SYRINGE (ML) INJECTION AS NEEDED
Status: DISCONTINUED | OUTPATIENT
Start: 2021-06-20 | End: 2021-06-20 | Stop reason: HOSPADM

## 2021-06-20 RX ORDER — DOCUSATE SODIUM 100 MG/1
100 CAPSULE, LIQUID FILLED ORAL 2 TIMES DAILY
Qty: 20 CAPSULE | Refills: 0 | Status: SHIPPED | OUTPATIENT
Start: 2021-06-20

## 2021-06-20 RX ADMIN — PROMETHAZINE HYDROCHLORIDE 12.5 MG: 25 INJECTION INTRAMUSCULAR; INTRAVENOUS at 13:55

## 2021-06-20 RX ADMIN — HYDROMORPHONE HYDROCHLORIDE 1 MG: 1 INJECTION, SOLUTION INTRAMUSCULAR; INTRAVENOUS; SUBCUTANEOUS at 14:32

## 2021-06-20 RX ADMIN — SODIUM CHLORIDE 1000 ML: 9 INJECTION, SOLUTION INTRAVENOUS at 13:22

## 2021-06-20 RX ADMIN — HYDROMORPHONE HYDROCHLORIDE 0.5 MG: 1 INJECTION, SOLUTION INTRAMUSCULAR; INTRAVENOUS; SUBCUTANEOUS at 13:22

## 2021-06-20 RX ADMIN — Medication 300 UNITS: at 15:24

## 2021-06-20 NOTE — DISCHARGE INSTRUCTIONS
Rest at home, drink plenty of fluids.  Follow-up with your family doctor the next available appointment.  You are being started on a stool softener and a laxative to help facilitate more frequent bowel movements.  Follow-up with urology at your scheduled appointment.  Return to the ED if your symptoms change or worsen.

## 2021-06-20 NOTE — ED PROVIDER NOTES
Subjective   46-year-old female presents to the ED today for left flank pain.  She states she started to have pain around midnight.  She states the pain is in her left flank and radiates around into her left lower quadrant.  States the pain has been severe.  She states she started to have blood in her urine today.  States she is also had nausea and vomiting.  She states she has had increased urinary urgency and frequency but can only go a small amount at a time.  She denies any dysuria.  She denies any fever but states that she has chills.  She states she has taken ibuprofen and Phenergan at home with no relief.  She does have a history of IgA nephropathy and has intermittent hematuria.  She states she also has a history of urethral stricture and has to have dilation of her urethra about every 4 weeks.  Her last dilation was on May 27.  She states she does sometimes self cath at home.      History provided by:  Patient  Flank Pain  Pain location:  L flank  Pain quality: sharp    Pain radiates to:  LLQ  Pain severity:  Severe  Onset quality:  Sudden  Duration:  13 hours  Timing:  Constant  Progression:  Unchanged  Chronicity:  New  Context: awakening from sleep    Relieved by:  Nothing  Worsened by:  Nothing  Associated symptoms: chills, hematuria, nausea and vomiting    Associated symptoms: no chest pain, no constipation, no cough, no diarrhea, no dysuria, no fatigue, no fever, no hematemesis, no hematochezia, no melena, no shortness of breath, no vaginal bleeding and no vaginal discharge        Review of Systems   Constitutional: Positive for chills. Negative for fatigue and fever.   HENT: Negative.    Eyes: Negative.    Respiratory: Negative for cough and shortness of breath.    Cardiovascular: Negative for chest pain.   Gastrointestinal: Positive for nausea and vomiting. Negative for constipation, diarrhea, hematemesis, hematochezia and melena.   Genitourinary: Positive for difficulty urinating, flank pain,  frequency, hematuria and urgency. Negative for dysuria, vaginal bleeding and vaginal discharge.   Musculoskeletal: Positive for back pain.   Skin: Negative.    Neurological: Negative.    Psychiatric/Behavioral: Negative.    All other systems reviewed and are negative.      Past Medical History:   Diagnosis Date   • Abdominal pain    • Abdominal swelling    • Hoyos's disease    • Bipolar 1 disorder (CMS/HCC)    • Brain tumor (benign) (CMS/HCC)    • Brain tumor (CMS/HCC)     R Frontal Lobe per pt   • Brain tumor (CMS/HCC) 2014   • Constipation    • DDD (degenerative disc disease), cervical 05/29/2017   • DDD (degenerative disc disease), cervical    • Diarrhea    • Fibromyalgia    • IBS (irritable bowel syndrome)    • Migraine    • Nausea & vomiting    • PONV (postoperative nausea and vomiting)    • PTSD (post-traumatic stress disorder)    • Rectal bleeding        Allergies   Allergen Reactions   • Ativan [Lorazepam] Hallucinations     confusion   • Sulfa Antibiotics Shortness Of Breath and Swelling   • Sulfa Antibiotics Anaphylaxis   • Reglan [Metoclopramide] Angioedema   • Compazine [Prochlorperazine Edisylate] Hives   • Demerol [Meperidine] Hives   • Droperidol Itching   • Metoclopramide Swelling   • Toradol [Ketorolac Tromethamine] Hives and Itching   • Toradol [Ketorolac Tromethamine] Hives   • Zofran [Ondansetron Hcl] Rash   • Zosyn [Piperacillin Sod-Tazobactam So] Hives       Past Surgical History:   Procedure Laterality Date   • ANAL SCOPE N/A 7/28/2016    Procedure: ANAL SCOPE;  Surgeon: Kael Lopez MD;  Location: [facility] OR;  Service:    • APPENDECTOMY     • COLONOSCOPY N/A 6/30/2016    Procedure: COLONOSCOPY  CPTCODE:52804;  Surgeon: Jose Antonio Belle III, MD;  Location: [facility] OR;  Service:    • COLONOSCOPY N/A 7/7/2016    Procedure: COLONOSCOPY (26231) CPT;  Surgeon: Jose Antonio Belle III, MD;  Location: [facility] OR;  Service:    • CYSTOSCOPY RETROGRADE PYELOGRAM Bilateral 4/28/2017    Procedure:  CYSTOSCOPY RETROGRADE PYELOGRAM;  Surgeon: Mu Blunt MD;  Location: [facility] OR;  Service:    • ENDOSCOPY N/A 2016    Procedure: ESOPHAGOGASTRODUODENOSCOPY WITH BIOPSY  CPTCODE:72179;  Surgeon: Jose Antonio Belle III, MD;  Location: [facility] OR;  Service:    • HEMORRHOIDECTOMY N/A 2016    Procedure: HEMORRHOID STAPLING;  Surgeon: Kael Lopez MD;  Location: [facility] OR;  Service:    • HYSTERECTOMY     • KNEE SURGERY     • LAPAROSCOPIC SALPINGOOPHERECTOMY     • PORTACATH PLACEMENT N/A 2017    Procedure: INSERTION OF PORTACATH;  Surgeon: Celso Arredondo MD;  Location: [facility] OR;  Service:    • SHOULDER SURGERY      3 times       Family History   Problem Relation Age of Onset   • Crohn's disease Other    • Hypertension Other    • Diabetes Other    • Irritable bowel syndrome Other    • No Known Problems Father    • No Known Problems Mother        Social History     Socioeconomic History   • Marital status:      Spouse name: Not on file   • Number of children: Not on file   • Years of education: Not on file   • Highest education level: Not on file   Tobacco Use   • Smoking status: Former Smoker     Packs/day: 1.00     Years: 20.00     Pack years: 20.00     Quit date: 2015     Years since quittin.6   • Smokeless tobacco: Never Used   Substance and Sexual Activity   • Alcohol use: No   • Drug use: Yes     Types: Marijuana     Comment: for pain   • Sexual activity: Defer     Birth control/protection: Surgical           Objective   Physical Exam  Vitals and nursing note reviewed.   Constitutional:       General: She is not in acute distress.     Appearance: Normal appearance. She is not diaphoretic.   HENT:      Head: Normocephalic and atraumatic.      Right Ear: External ear normal.      Left Ear: External ear normal.   Eyes:      Conjunctiva/sclera: Conjunctivae normal.      Pupils: Pupils are equal, round, and reactive to light.   Cardiovascular:      Rate and Rhythm: Normal  rate and regular rhythm.      Pulses: Normal pulses.      Heart sounds: Normal heart sounds.   Pulmonary:      Effort: Pulmonary effort is normal.      Breath sounds: Normal breath sounds.   Chest:      Chest wall: No tenderness.   Abdominal:      General: Bowel sounds are normal.      Palpations: Abdomen is soft.      Tenderness: There is abdominal tenderness (tenderness in left abdomen). There is left CVA tenderness. There is no right CVA tenderness, guarding or rebound.   Musculoskeletal:         General: Normal range of motion.      Cervical back: Normal range of motion and neck supple.   Skin:     General: Skin is warm and dry.      Capillary Refill: Capillary refill takes less than 2 seconds.   Neurological:      General: No focal deficit present.      Mental Status: She is alert and oriented to person, place, and time.   Psychiatric:         Mood and Affect: Mood normal.         Procedures           ED Course  ED Course as of Jun 20 1546   Sun Jun 20, 2021   1545 Patient's pain has improved.  No acute findings on her ED work-up.  She does have a large amount of stool seen on her CT scan.  She will be started on Colace and MiraLAX for this.  She was advised to follow-up with her primary care provider at the next available appointment and to follow-up with urology as scheduled or sooner if needed.  She will return to the ED if her symptoms change or worsen.    [AH]      ED Course User Index  [AH] Kristin Shell PA                                           [facility]  Number of Diagnoses or Management Options     Amount and/or Complexity of Data Reviewed  Clinical lab tests: reviewed  Tests in the radiology section of CPT®: reviewed  Decide to obtain previous medical records or to obtain history from someone other than the patient: yes    Patient Progress  Patient progress: improved      Final diagnoses:   Left flank pain   Gross hematuria       ED Disposition  ED Disposition     ED Disposition Condition Comment     Discharge Stable           Mabel Patterson, APRN  [address] [address]  [phone]    Schedule an appointment as soon as possible for a visit in 2 days           Medication List      New Prescriptions    docusate sodium 100 MG capsule  Commonly known as: COLACE  Take 1 capsule by mouth 2 (Two) Times a Day.        Changed    promethazine 25 MG tablet  Commonly known as: PHENERGAN  Take 1 tablet by mouth Every 6 (Six) Hours As Needed for Nausea or Vomiting.  What changed: Another medication with the same name was removed. Continue taking this medication, and follow the directions you see here.        Stop    acetaminophen-codeine 300-30 MG per tablet  Commonly known as: TYLENOL #3     cetirizine 10 MG tablet  Commonly known as: zyrTEC     metroNIDAZOLE 500 MG tablet  Commonly known as: FLAGYL     orphenadrine 100 MG 12 hr tablet  Commonly known as: NORFLEX           Where to Get Your Medications      You can get these medications from any pharmacy    Bring a paper prescription for each of these medications  · docusate sodium 100 MG capsule  · polyethylene glycol 17 GM/SCOOP powder  · promethazine 25 MG tablet          Kristin Shell PA  06/20/21 1541

## 2021-06-25 LAB
BACTERIA SPEC AEROBE CULT: NORMAL
BACTERIA SPEC AEROBE CULT: NORMAL

## 2021-06-28 ENCOUNTER — TELEPHONE (OUTPATIENT)
Dept: UROLOGY | Facility: CLINIC | Age: 46
End: 2021-06-28

## 2021-06-28 NOTE — TELEPHONE ENCOUNTER
PATIENT IS HAVING PROBLEMS SHE SAYS SHE IS NEEDING IN SOONER TO HAVE URETHA DILATED TOLD HER WE WOULD HAVE TO ASK STEIDLE DUE TO HIM BEING OVERBOOKED SO MUCH ALREADY SHE IS SCHEDULED TO COME IN THIS Thursday

## 2021-06-29 ENCOUNTER — OFFICE VISIT (OUTPATIENT)
Dept: UROLOGY | Facility: CLINIC | Age: 46
End: 2021-06-29

## 2021-06-29 VITALS — WEIGHT: 123.02 LBS | BODY MASS INDEX: 19.31 KG/M2 | HEIGHT: 67 IN

## 2021-06-29 DIAGNOSIS — R31.0 GROSS HEMATURIA: Primary | ICD-10-CM

## 2021-06-29 DIAGNOSIS — N35.028 OTHER POST-TRAUMATIC URETHRAL STRICTURE, FEMALE: ICD-10-CM

## 2021-06-29 PROCEDURE — 53661 DILATION OF URETHRA: CPT | Performed by: UROLOGY

## 2021-06-29 PROCEDURE — 99213 OFFICE O/P EST LOW 20 MIN: CPT | Performed by: UROLOGY

## 2021-06-29 PROCEDURE — 96372 THER/PROPH/DIAG INJ SC/IM: CPT | Performed by: UROLOGY

## 2021-06-29 RX ORDER — OXYCODONE AND ACETAMINOPHEN 10; 325 MG/1; MG/1
1 TABLET ORAL EVERY 6 HOURS PRN
Qty: 8 TABLET | Refills: 0 | Status: SHIPPED | OUTPATIENT
Start: 2021-06-29 | End: 2021-07-29 | Stop reason: SDUPTHER

## 2021-06-29 RX ORDER — GENTAMICIN SULFATE 40 MG/ML
80 INJECTION, SOLUTION INTRAMUSCULAR; INTRAVENOUS ONCE
Status: COMPLETED | OUTPATIENT
Start: 2021-06-29 | End: 2021-06-29

## 2021-06-29 RX ADMIN — GENTAMICIN SULFATE 80 MG: 40 INJECTION, SOLUTION INTRAMUSCULAR; INTRAVENOUS at 10:45

## 2021-07-11 ENCOUNTER — HOSPITAL ENCOUNTER (EMERGENCY)
Facility: HOSPITAL | Age: 46
Discharge: HOME | End: 2021-07-11
Attending: FAMILY MEDICINE | Admitting: FAMILY MEDICINE

## 2021-07-11 ENCOUNTER — APPOINTMENT (OUTPATIENT)
Dept: CT IMAGING | Facility: HOSPITAL | Age: 46
End: 2021-07-11

## 2021-07-11 VITALS
TEMPERATURE: 98.9 F | OXYGEN SATURATION: 98 % | RESPIRATION RATE: 18 BRPM | DIASTOLIC BLOOD PRESSURE: 91 MMHG | BODY MASS INDEX: 18.64 KG/M2 | HEART RATE: 102 BPM | HEIGHT: 68 IN | WEIGHT: 123 LBS | SYSTOLIC BLOOD PRESSURE: 144 MMHG

## 2021-07-11 DIAGNOSIS — R31.9 HEMATURIA, UNSPECIFIED TYPE: Primary | ICD-10-CM

## 2021-07-11 DIAGNOSIS — R10.9 FLANK PAIN: ICD-10-CM

## 2021-07-11 LAB
ALBUMIN SERPL-MCNC: 4.25 G/DL (ref 3.5–5.2)
ALBUMIN/GLOB SERPL: 1.7 G/DL
ALP SERPL-CCNC: 74 U/L (ref 39–117)
ALT SERPL W P-5'-P-CCNC: 38 U/L (ref 1–33)
ANION GAP SERPL CALCULATED.3IONS-SCNC: 10.1 MMOL/L (ref 5–15)
AST SERPL-CCNC: 39 U/L (ref 1–32)
BACTERIA UR QL AUTO: ABNORMAL /HPF
BASOPHILS # BLD AUTO: 0.02 10*3/MM3 (ref 0–0.2)
BASOPHILS NFR BLD AUTO: 0.4 % (ref 0–1.5)
BILIRUB SERPL-MCNC: 0.7 MG/DL (ref 0–1.2)
BILIRUB UR QL STRIP: NEGATIVE
BUN SERPL-MCNC: 4 MG/DL (ref 6–20)
BUN/CREAT SERPL: 7 (ref 7–25)
CALCIUM SPEC-SCNC: 9.3 MG/DL (ref 8.6–10.5)
CHLORIDE SERPL-SCNC: 102 MMOL/L (ref 98–107)
CLARITY UR: ABNORMAL
CO2 SERPL-SCNC: 27.9 MMOL/L (ref 22–29)
COLOR UR: ABNORMAL
CREAT SERPL-MCNC: 0.57 MG/DL (ref 0.57–1)
DEPRECATED RDW RBC AUTO: 43.2 FL (ref 37–54)
EOSINOPHIL # BLD AUTO: 0.09 10*3/MM3 (ref 0–0.4)
EOSINOPHIL NFR BLD AUTO: 1.9 % (ref 0.3–6.2)
ERYTHROCYTE [DISTWIDTH] IN BLOOD BY AUTOMATED COUNT: 12.9 % (ref 12.3–15.4)
GFR SERPL CREATININE-BSD FRML MDRD: 114 ML/MIN/1.73
GLOBULIN UR ELPH-MCNC: 2.6 GM/DL
GLUCOSE SERPL-MCNC: 94 MG/DL (ref 65–99)
GLUCOSE UR STRIP-MCNC: NEGATIVE MG/DL
HCT VFR BLD AUTO: 44.3 % (ref 34–46.6)
HGB BLD-MCNC: 14.9 G/DL (ref 12–15.9)
HGB UR QL STRIP.AUTO: ABNORMAL
HYALINE CASTS UR QL AUTO: ABNORMAL /LPF
IMM GRANULOCYTES # BLD AUTO: 0.01 10*3/MM3 (ref 0–0.05)
IMM GRANULOCYTES NFR BLD AUTO: 0.2 % (ref 0–0.5)
KETONES UR QL STRIP: NEGATIVE
LEUKOCYTE ESTERASE UR QL STRIP.AUTO: ABNORMAL
LYMPHOCYTES # BLD AUTO: 1.43 10*3/MM3 (ref 0.7–3.1)
LYMPHOCYTES NFR BLD AUTO: 30.7 % (ref 19.6–45.3)
MCH RBC QN AUTO: 30.7 PG (ref 26.6–33)
MCHC RBC AUTO-ENTMCNC: 33.6 G/DL (ref 31.5–35.7)
MCV RBC AUTO: 91.2 FL (ref 79–97)
MONOCYTES # BLD AUTO: 0.27 10*3/MM3 (ref 0.1–0.9)
MONOCYTES NFR BLD AUTO: 5.8 % (ref 5–12)
NEUTROPHILS NFR BLD AUTO: 2.84 10*3/MM3 (ref 1.7–7)
NEUTROPHILS NFR BLD AUTO: 61 % (ref 42.7–76)
NITRITE UR QL STRIP: NEGATIVE
NRBC BLD AUTO-RTO: 0 /100 WBC (ref 0–0.2)
PH UR STRIP.AUTO: 8 [PH] (ref 5–8)
PLATELET # BLD AUTO: 150 10*3/MM3 (ref 140–450)
PMV BLD AUTO: 10.9 FL (ref 6–12)
POTASSIUM SERPL-SCNC: 3.8 MMOL/L (ref 3.5–5.2)
PROT SERPL-MCNC: 6.8 G/DL (ref 6–8.5)
PROT UR QL STRIP: ABNORMAL
RBC # BLD AUTO: 4.86 10*6/MM3 (ref 3.77–5.28)
RBC # UR: ABNORMAL /HPF
REF LAB TEST METHOD: ABNORMAL
SODIUM SERPL-SCNC: 140 MMOL/L (ref 136–145)
SP GR UR STRIP: 1.01 (ref 1–1.03)
SQUAMOUS #/AREA URNS HPF: ABNORMAL /HPF
UROBILINOGEN UR QL STRIP: ABNORMAL
WBC # BLD AUTO: 4.66 10*3/MM3 (ref 3.4–10.8)
WBC UR QL AUTO: ABNORMAL /HPF

## 2021-07-11 PROCEDURE — 81001 URINALYSIS AUTO W/SCOPE: CPT | Performed by: PHYSICIAN ASSISTANT

## 2021-07-11 PROCEDURE — 96374 THER/PROPH/DIAG INJ IV PUSH: CPT

## 2021-07-11 PROCEDURE — 96376 TX/PRO/DX INJ SAME DRUG ADON: CPT

## 2021-07-11 PROCEDURE — 96375 TX/PRO/DX INJ NEW DRUG ADDON: CPT

## 2021-07-11 PROCEDURE — 25010000002 PROMETHAZINE PER 50 MG: Performed by: PHYSICIAN ASSISTANT

## 2021-07-11 PROCEDURE — 74176 CT ABD & PELVIS W/O CONTRAST: CPT

## 2021-07-11 PROCEDURE — 99283 EMERGENCY DEPT VISIT LOW MDM: CPT

## 2021-07-11 PROCEDURE — 74176 CT ABD & PELVIS W/O CONTRAST: CPT | Performed by: RADIOLOGY

## 2021-07-11 PROCEDURE — 25010000002 HYDROMORPHONE PER 4 MG: Performed by: FAMILY MEDICINE

## 2021-07-11 PROCEDURE — 80053 COMPREHEN METABOLIC PANEL: CPT | Performed by: PHYSICIAN ASSISTANT

## 2021-07-11 PROCEDURE — 85025 COMPLETE CBC W/AUTO DIFF WBC: CPT | Performed by: PHYSICIAN ASSISTANT

## 2021-07-11 PROCEDURE — 25010000002 HYDROMORPHONE 1 MG/ML SOLUTION: Performed by: FAMILY MEDICINE

## 2021-07-11 RX ORDER — HYDROMORPHONE HYDROCHLORIDE 1 MG/ML
0.5 INJECTION, SOLUTION INTRAMUSCULAR; INTRAVENOUS; SUBCUTANEOUS ONCE
Status: COMPLETED | OUTPATIENT
Start: 2021-07-11 | End: 2021-07-11

## 2021-07-11 RX ADMIN — HYDROMORPHONE HYDROCHLORIDE 1 MG: 1 INJECTION, SOLUTION INTRAMUSCULAR; INTRAVENOUS; SUBCUTANEOUS at 12:10

## 2021-07-11 RX ADMIN — HYDROMORPHONE HYDROCHLORIDE 0.5 MG: 1 INJECTION, SOLUTION INTRAMUSCULAR; INTRAVENOUS; SUBCUTANEOUS at 14:38

## 2021-07-11 RX ADMIN — SODIUM CHLORIDE 1000 ML: 9 INJECTION, SOLUTION INTRAVENOUS at 12:09

## 2021-07-11 RX ADMIN — PROMETHAZINE HYDROCHLORIDE 12.5 MG: 25 INJECTION INTRAMUSCULAR; INTRAVENOUS at 12:14

## 2021-07-11 NOTE — ED PROVIDER NOTES
Subjective     History provided by:  Patient   used: No    Flank Pain  Pain location:  R flank  Pain quality: aching and dull    Pain radiates to:  Suprapubic region  Pain severity:  Moderate  Onset quality:  Sudden  Duration:  1 day  Timing:  Constant  Progression:  Worsening  Chronicity:  Chronic  Relieved by:  Nothing  Ineffective treatments:  None tried  Associated symptoms: hematuria    Associated symptoms: no chest pain, no chills, no cough, no diarrhea, no dysuria, no fever, no nausea, no shortness of breath, no sore throat and no vomiting        Review of Systems   Constitutional: Negative for chills and fever.   HENT: Negative for congestion, ear pain and sore throat.    Respiratory: Negative for cough, shortness of breath and wheezing.    Cardiovascular: Negative for chest pain.   Gastrointestinal: Negative for diarrhea, nausea and vomiting.   Genitourinary: Positive for flank pain and hematuria. Negative for dysuria.   Skin: Negative for rash.   Neurological: Negative for headaches.   Psychiatric/Behavioral: The patient is not nervous/anxious.    All other systems reviewed and are negative.      Past Medical History:   Diagnosis Date   • Abdominal pain    • Abdominal swelling    • Hoyos's disease    • Bipolar 1 disorder (CMS/HCC)    • Brain tumor (benign) (CMS/HCC)    • Brain tumor (CMS/HCC)     R Frontal Lobe per pt   • Brain tumor (CMS/HCC) 2014   • Constipation    • DDD (degenerative disc disease), cervical 05/29/2017   • DDD (degenerative disc disease), cervical    • Diarrhea    • Fibromyalgia    • IBS (irritable bowel syndrome)    • Migraine    • Nausea & vomiting    • PONV (postoperative nausea and vomiting)    • PTSD (post-traumatic stress disorder)    • Rectal bleeding        Allergies   Allergen Reactions   • Ativan [Lorazepam] Hallucinations     confusion   • Sulfa Antibiotics Shortness Of Breath and Swelling   • Sulfa Antibiotics Anaphylaxis   • Reglan [Metoclopramide]  Angioedema   • Compazine [Prochlorperazine Edisylate] Hives   • Demerol [Meperidine] Hives   • Droperidol Itching   • Metoclopramide Swelling   • Toradol [Ketorolac Tromethamine] Hives and Itching   • Toradol [Ketorolac Tromethamine] Hives   • Zofran [Ondansetron Hcl] Rash   • Zosyn [Piperacillin Sod-Tazobactam So] Hives       Past Surgical History:   Procedure Laterality Date   • ANAL SCOPE N/A 7/28/2016    Procedure: ANAL SCOPE;  Surgeon: Kael Lopez MD;  Location: [facility] OR;  Service:    • APPENDECTOMY     • COLONOSCOPY N/A 6/30/2016    Procedure: COLONOSCOPY  CPTCODE:08054;  Surgeon: Jose Antonio Belle III, MD;  Location: [facility] OR;  Service:    • COLONOSCOPY N/A 7/7/2016    Procedure: COLONOSCOPY (58884) CPT;  Surgeon: Jose Antonio Belle III, MD;  Location: [facility] OR;  Service:    • CYSTOSCOPY RETROGRADE PYELOGRAM Bilateral 4/28/2017    Procedure: CYSTOSCOPY RETROGRADE PYELOGRAM;  Surgeon: Mu Blunt MD;  Location: [facility] OR;  Service:    • ENDOSCOPY N/A 6/30/2016    Procedure: ESOPHAGOGASTRODUODENOSCOPY WITH BIOPSY  CPTCODE:93034;  Surgeon: Jose Antonio Belle III, MD;  Location: [facility] OR;  Service:    • HEMORRHOIDECTOMY N/A 7/28/2016    Procedure: HEMORRHOID STAPLING;  Surgeon: Kael Lopez MD;  Location: [facility] OR;  Service:    • HYSTERECTOMY     • KNEE SURGERY     • LAPAROSCOPIC SALPINGOOPHERECTOMY     • PORTACATH PLACEMENT N/A 7/28/2017    Procedure: INSERTION OF PORTACATH;  Surgeon: Celso Arredondo MD;  Location: [facility] OR;  Service:    • SHOULDER SURGERY      3 times       Family History   Problem Relation Age of Onset   • Crohn's disease Other    • Hypertension Other    • Diabetes Other    • Irritable bowel syndrome Other    • No Known Problems Father    • No Known Problems Mother        Social History     Socioeconomic History   • Marital status:      Spouse name: Not on file   • Number of children: Not on file   • Years of education: Not on file   • Highest education  level: Not on file   Tobacco Use   • Smoking status: Former Smoker     Packs/day: 1.00     Years: 20.00     Pack years: 20.00     Quit date: 2015     Years since quittin.6   • Smokeless tobacco: Never Used   Substance and Sexual Activity   • Alcohol use: No   • Drug use: Yes     Types: Marijuana     Comment: for pain   • Sexual activity: Defer     Birth control/protection: Surgical           Objective   Physical Exam  Vitals and nursing note reviewed.   Constitutional:       Appearance: She is well-developed.   HENT:      Head: Normocephalic.   Cardiovascular:      Rate and Rhythm: Normal rate and regular rhythm.   Pulmonary:      Effort: Pulmonary effort is normal.      Breath sounds: Normal breath sounds.   Abdominal:      General: Bowel sounds are normal.      Palpations: Abdomen is soft.      Tenderness: There is no abdominal tenderness.   Musculoskeletal:         General: Normal range of motion.      Cervical back: Neck supple.   Skin:     General: Skin is warm and dry.   Neurological:      Mental Status: She is alert and oriented to person, place, and time.   Psychiatric:         Behavior: Behavior normal.         Thought Content: Thought content normal.         Judgment: Judgment normal.         Procedures           ED Course                                           MDM    Final diagnoses:   Hematuria, unspecified type   Flank pain       ED Disposition  ED Disposition     ED Disposition Condition Comment    Discharge Stable           Mu Blunt MD  [address] [facility] [address]  [facility] [id number] [phone]    In 1 day           Medication List      No changes were made to your prescriptions during this visit.          Xenia Garza PA  21 [phone]

## 2021-07-16 ENCOUNTER — TELEPHONE (OUTPATIENT)
Dept: UROLOGY | Facility: CLINIC | Age: 46
End: 2021-07-16

## 2021-07-16 NOTE — TELEPHONE ENCOUNTER
According to the CT there is a small cyst, that should not be causing such pain and hematuria, pt can schedule an appt next week to check her urine, she could have a UTI but the ER did not send her urine out

## 2021-07-16 NOTE — TELEPHONE ENCOUNTER
Patient is having pain and hematuria she went to er on 7/11/21 they said she has cyst on right kidney her  is wanting to know what to do if she needs to come in or what she needs to do

## 2021-07-28 ENCOUNTER — HOSPITAL ENCOUNTER (EMERGENCY)
Facility: HOSPITAL | Age: 46
Discharge: HOME | End: 2021-07-28
Attending: EMERGENCY MEDICINE | Admitting: EMERGENCY MEDICINE

## 2021-07-28 ENCOUNTER — APPOINTMENT (OUTPATIENT)
Dept: CT IMAGING | Facility: HOSPITAL | Age: 46
End: 2021-07-28

## 2021-07-28 VITALS
DIASTOLIC BLOOD PRESSURE: 87 MMHG | TEMPERATURE: 97.9 F | SYSTOLIC BLOOD PRESSURE: 121 MMHG | OXYGEN SATURATION: 100 % | HEIGHT: 67 IN | HEART RATE: 64 BPM | BODY MASS INDEX: 19.78 KG/M2 | WEIGHT: 126 LBS | RESPIRATION RATE: 17 BRPM

## 2021-07-28 DIAGNOSIS — R31.9 HEMATURIA, UNSPECIFIED TYPE: Primary | ICD-10-CM

## 2021-07-28 LAB
ALBUMIN SERPL-MCNC: 4.25 G/DL (ref 3.5–5.2)
ALBUMIN/GLOB SERPL: 1.6 G/DL
ALP SERPL-CCNC: 73 U/L (ref 39–117)
ALT SERPL W P-5'-P-CCNC: 26 U/L (ref 1–33)
ANION GAP SERPL CALCULATED.3IONS-SCNC: 9.1 MMOL/L (ref 5–15)
AST SERPL-CCNC: 30 U/L (ref 1–32)
BACTERIA UR QL AUTO: ABNORMAL /HPF
BASOPHILS # BLD AUTO: 0.01 10*3/MM3 (ref 0–0.2)
BASOPHILS NFR BLD AUTO: 0.2 % (ref 0–1.5)
BILIRUB SERPL-MCNC: 0.6 MG/DL (ref 0–1.2)
BILIRUB UR QL STRIP: NEGATIVE
BUN SERPL-MCNC: 5 MG/DL (ref 6–20)
BUN/CREAT SERPL: 8.3 (ref 7–25)
CALCIUM SPEC-SCNC: 9.2 MG/DL (ref 8.6–10.5)
CHLORIDE SERPL-SCNC: 106 MMOL/L (ref 98–107)
CLARITY UR: CLEAR
CO2 SERPL-SCNC: 24.9 MMOL/L (ref 22–29)
COLOR UR: ABNORMAL
CREAT SERPL-MCNC: 0.6 MG/DL (ref 0.57–1)
DEPRECATED RDW RBC AUTO: 45.1 FL (ref 37–54)
EOSINOPHIL # BLD AUTO: 0.13 10*3/MM3 (ref 0–0.4)
EOSINOPHIL NFR BLD AUTO: 2 % (ref 0.3–6.2)
ERYTHROCYTE [DISTWIDTH] IN BLOOD BY AUTOMATED COUNT: 13 % (ref 12.3–15.4)
GFR SERPL CREATININE-BSD FRML MDRD: 108 ML/MIN/1.73
GLOBULIN UR ELPH-MCNC: 2.7 GM/DL
GLUCOSE SERPL-MCNC: 77 MG/DL (ref 65–99)
GLUCOSE UR STRIP-MCNC: NEGATIVE MG/DL
HCT VFR BLD AUTO: 46.5 % (ref 34–46.6)
HGB BLD-MCNC: 15.5 G/DL (ref 12–15.9)
HGB UR QL STRIP.AUTO: ABNORMAL
HYALINE CASTS UR QL AUTO: ABNORMAL /LPF
IMM GRANULOCYTES # BLD AUTO: 0.01 10*3/MM3 (ref 0–0.05)
IMM GRANULOCYTES NFR BLD AUTO: 0.2 % (ref 0–0.5)
KETONES UR QL STRIP: NEGATIVE
LEUKOCYTE ESTERASE UR QL STRIP.AUTO: ABNORMAL
LIPASE SERPL-CCNC: 40 U/L (ref 13–60)
LYMPHOCYTES # BLD AUTO: 2.48 10*3/MM3 (ref 0.7–3.1)
LYMPHOCYTES NFR BLD AUTO: 38.9 % (ref 19.6–45.3)
MCH RBC QN AUTO: 31.4 PG (ref 26.6–33)
MCHC RBC AUTO-ENTMCNC: 33.3 G/DL (ref 31.5–35.7)
MCV RBC AUTO: 94.3 FL (ref 79–97)
MONOCYTES # BLD AUTO: 0.35 10*3/MM3 (ref 0.1–0.9)
MONOCYTES NFR BLD AUTO: 5.5 % (ref 5–12)
NEUTROPHILS NFR BLD AUTO: 3.4 10*3/MM3 (ref 1.7–7)
NEUTROPHILS NFR BLD AUTO: 53.2 % (ref 42.7–76)
NITRITE UR QL STRIP: NEGATIVE
NRBC BLD AUTO-RTO: 0 /100 WBC (ref 0–0.2)
PH UR STRIP.AUTO: 7.5 [PH] (ref 5–8)
PLATELET # BLD AUTO: 145 10*3/MM3 (ref 140–450)
PMV BLD AUTO: 10.5 FL (ref 6–12)
POTASSIUM SERPL-SCNC: 3.7 MMOL/L (ref 3.5–5.2)
PROT SERPL-MCNC: 6.9 G/DL (ref 6–8.5)
PROT UR QL STRIP: NEGATIVE
RBC # BLD AUTO: 4.93 10*6/MM3 (ref 3.77–5.28)
RBC # UR: ABNORMAL /HPF
REF LAB TEST METHOD: ABNORMAL
SODIUM SERPL-SCNC: 140 MMOL/L (ref 136–145)
SP GR UR STRIP: 1.01 (ref 1–1.03)
SQUAMOUS #/AREA URNS HPF: ABNORMAL /HPF
UROBILINOGEN UR QL STRIP: ABNORMAL
WBC # BLD AUTO: 6.38 10*3/MM3 (ref 3.4–10.8)
WBC UR QL AUTO: ABNORMAL /HPF

## 2021-07-28 PROCEDURE — 25010000002 HEPARIN LOCK FLUSH PER 10 UNITS: Performed by: EMERGENCY MEDICINE

## 2021-07-28 PROCEDURE — 83690 ASSAY OF LIPASE: CPT | Performed by: EMERGENCY MEDICINE

## 2021-07-28 PROCEDURE — 25010000002 CEFTRIAXONE PER 250 MG: Performed by: EMERGENCY MEDICINE

## 2021-07-28 PROCEDURE — 74176 CT ABD & PELVIS W/O CONTRAST: CPT

## 2021-07-28 PROCEDURE — 25010000002 ONDANSETRON PER 1 MG: Performed by: EMERGENCY MEDICINE

## 2021-07-28 PROCEDURE — 96375 TX/PRO/DX INJ NEW DRUG ADDON: CPT

## 2021-07-28 PROCEDURE — 99283 EMERGENCY DEPT VISIT LOW MDM: CPT

## 2021-07-28 PROCEDURE — 74176 CT ABD & PELVIS W/O CONTRAST: CPT | Performed by: RADIOLOGY

## 2021-07-28 PROCEDURE — 25010000002 MORPHINE PER 10 MG: Performed by: EMERGENCY MEDICINE

## 2021-07-28 PROCEDURE — 80053 COMPREHEN METABOLIC PANEL: CPT | Performed by: EMERGENCY MEDICINE

## 2021-07-28 PROCEDURE — 81001 URINALYSIS AUTO W/SCOPE: CPT | Performed by: EMERGENCY MEDICINE

## 2021-07-28 PROCEDURE — 96365 THER/PROPH/DIAG IV INF INIT: CPT

## 2021-07-28 PROCEDURE — 85025 COMPLETE CBC W/AUTO DIFF WBC: CPT | Performed by: EMERGENCY MEDICINE

## 2021-07-28 PROCEDURE — 96376 TX/PRO/DX INJ SAME DRUG ADON: CPT

## 2021-07-28 RX ORDER — CEFDINIR 300 MG/1
300 CAPSULE ORAL 2 TIMES DAILY
Qty: 14 CAPSULE | Refills: 0 | Status: SHIPPED | OUTPATIENT
Start: 2021-07-28 | End: 2021-09-02

## 2021-07-28 RX ORDER — ONDANSETRON 4 MG/1
4 TABLET, FILM COATED ORAL EVERY 6 HOURS PRN
Qty: 30 TABLET | Refills: 0 | OUTPATIENT
Start: 2021-07-28 | End: 2021-10-01

## 2021-07-28 RX ORDER — ONDANSETRON 2 MG/ML
4 INJECTION INTRAMUSCULAR; INTRAVENOUS ONCE
Status: COMPLETED | OUTPATIENT
Start: 2021-07-28 | End: 2021-07-28

## 2021-07-28 RX ORDER — OXYCODONE HYDROCHLORIDE AND ACETAMINOPHEN 5; 325 MG/1; MG/1
1 TABLET ORAL ONCE
Status: COMPLETED | OUTPATIENT
Start: 2021-07-28 | End: 2021-07-28

## 2021-07-28 RX ORDER — HEPARIN SODIUM (PORCINE) LOCK FLUSH IV SOLN 100 UNIT/ML 100 UNIT/ML
300 SOLUTION INTRAVENOUS ONCE
Status: COMPLETED | OUTPATIENT
Start: 2021-07-28 | End: 2021-07-28

## 2021-07-28 RX ORDER — IBUPROFEN 600 MG/1
600 TABLET ORAL ONCE
Status: COMPLETED | OUTPATIENT
Start: 2021-07-28 | End: 2021-07-28

## 2021-07-28 RX ADMIN — CEFTRIAXONE SODIUM 1 G: 1 INJECTION, POWDER, FOR SOLUTION INTRAMUSCULAR; INTRAVENOUS at 20:50

## 2021-07-28 RX ADMIN — ONDANSETRON 4 MG: 2 INJECTION INTRAMUSCULAR; INTRAVENOUS at 21:53

## 2021-07-28 RX ADMIN — IBUPROFEN 600 MG: 600 TABLET, FILM COATED ORAL at 21:53

## 2021-07-28 RX ADMIN — MORPHINE SULFATE 4 MG: 4 INJECTION, SOLUTION INTRAMUSCULAR; INTRAVENOUS at 20:50

## 2021-07-28 RX ADMIN — OXYCODONE HYDROCHLORIDE AND ACETAMINOPHEN 1 TABLET: 5; 325 TABLET ORAL at 21:53

## 2021-07-28 RX ADMIN — ONDANSETRON 4 MG: 2 INJECTION INTRAMUSCULAR; INTRAVENOUS at 20:50

## 2021-07-28 RX ADMIN — Medication 300 UNITS: at 21:53

## 2021-07-29 ENCOUNTER — OFFICE VISIT (OUTPATIENT)
Dept: UROLOGY | Facility: CLINIC | Age: 46
End: 2021-07-29

## 2021-07-29 VITALS — WEIGHT: 126.1 LBS | BODY MASS INDEX: 19.79 KG/M2 | HEIGHT: 67 IN

## 2021-07-29 DIAGNOSIS — R31.0 GROSS HEMATURIA: ICD-10-CM

## 2021-07-29 DIAGNOSIS — N35.028 OTHER POST-TRAUMATIC URETHRAL STRICTURE, FEMALE: Primary | ICD-10-CM

## 2021-07-29 PROCEDURE — 53661 DILATION OF URETHRA: CPT | Performed by: UROLOGY

## 2021-07-29 PROCEDURE — 99214 OFFICE O/P EST MOD 30 MIN: CPT | Performed by: UROLOGY

## 2021-07-29 RX ORDER — OXYCODONE AND ACETAMINOPHEN 10; 325 MG/1; MG/1
1 TABLET ORAL EVERY 6 HOURS PRN
Qty: 10 TABLET | Refills: 0 | Status: SHIPPED | OUTPATIENT
Start: 2021-07-29 | End: 2021-08-16 | Stop reason: SDUPTHER

## 2021-08-12 ENCOUNTER — APPOINTMENT (OUTPATIENT)
Dept: GENERAL RADIOLOGY | Facility: HOSPITAL | Age: 46
End: 2021-08-12

## 2021-08-12 ENCOUNTER — HOSPITAL ENCOUNTER (EMERGENCY)
Facility: HOSPITAL | Age: 46
Discharge: HOME | End: 2021-08-12
Attending: EMERGENCY MEDICINE | Admitting: EMERGENCY MEDICINE

## 2021-08-12 VITALS
HEIGHT: 68 IN | SYSTOLIC BLOOD PRESSURE: 130 MMHG | OXYGEN SATURATION: 94 % | HEART RATE: 71 BPM | TEMPERATURE: 98.9 F | WEIGHT: 120 LBS | DIASTOLIC BLOOD PRESSURE: 98 MMHG | BODY MASS INDEX: 18.19 KG/M2 | RESPIRATION RATE: 18 BRPM

## 2021-08-12 DIAGNOSIS — U07.1 COVID-19: ICD-10-CM

## 2021-08-12 DIAGNOSIS — G43.901 MIGRAINE WITH STATUS MIGRAINOSUS, NOT INTRACTABLE, UNSPECIFIED MIGRAINE TYPE: Primary | ICD-10-CM

## 2021-08-12 LAB
ALBUMIN SERPL-MCNC: 4.11 G/DL (ref 3.5–5.2)
ALBUMIN/GLOB SERPL: 1.5 G/DL
ALP SERPL-CCNC: 70 U/L (ref 39–117)
ALT SERPL W P-5'-P-CCNC: 17 U/L (ref 1–33)
ANION GAP SERPL CALCULATED.3IONS-SCNC: 8.7 MMOL/L (ref 5–15)
AST SERPL-CCNC: 25 U/L (ref 1–32)
BASOPHILS # BLD AUTO: 0.01 10*3/MM3 (ref 0–0.2)
BASOPHILS NFR BLD AUTO: 0.3 % (ref 0–1.5)
BILIRUB SERPL-MCNC: 0.2 MG/DL (ref 0–1.2)
BUN SERPL-MCNC: 5 MG/DL (ref 6–20)
BUN/CREAT SERPL: 8.1 (ref 7–25)
CALCIUM SPEC-SCNC: 9 MG/DL (ref 8.6–10.5)
CHLORIDE SERPL-SCNC: 106 MMOL/L (ref 98–107)
CO2 SERPL-SCNC: 26.3 MMOL/L (ref 22–29)
CREAT SERPL-MCNC: 0.62 MG/DL (ref 0.57–1)
CRP SERPL-MCNC: <0.3 MG/DL (ref 0–0.5)
DEPRECATED RDW RBC AUTO: 43.8 FL (ref 37–54)
EOSINOPHIL # BLD AUTO: 0.05 10*3/MM3 (ref 0–0.4)
EOSINOPHIL NFR BLD AUTO: 1.4 % (ref 0.3–6.2)
ERYTHROCYTE [DISTWIDTH] IN BLOOD BY AUTOMATED COUNT: 13.1 % (ref 12.3–15.4)
FERRITIN SERPL-MCNC: 45.07 NG/ML (ref 13–150)
GFR SERPL CREATININE-BSD FRML MDRD: 104 ML/MIN/1.73
GLOBULIN UR ELPH-MCNC: 2.7 GM/DL
GLUCOSE SERPL-MCNC: 92 MG/DL (ref 65–99)
HCT VFR BLD AUTO: 46.1 % (ref 34–46.6)
HGB BLD-MCNC: 15.3 G/DL (ref 12–15.9)
HOLD SPECIMEN: NORMAL
HOLD SPECIMEN: NORMAL
IMM GRANULOCYTES # BLD AUTO: 0 10*3/MM3 (ref 0–0.05)
IMM GRANULOCYTES NFR BLD AUTO: 0 % (ref 0–0.5)
LDH SERPL-CCNC: 193 U/L (ref 135–214)
LYMPHOCYTES # BLD AUTO: 1.87 10*3/MM3 (ref 0.7–3.1)
LYMPHOCYTES NFR BLD AUTO: 53.1 % (ref 19.6–45.3)
MCH RBC QN AUTO: 30.4 PG (ref 26.6–33)
MCHC RBC AUTO-ENTMCNC: 33.2 G/DL (ref 31.5–35.7)
MCV RBC AUTO: 91.7 FL (ref 79–97)
MONOCYTES # BLD AUTO: 0.22 10*3/MM3 (ref 0.1–0.9)
MONOCYTES NFR BLD AUTO: 6.3 % (ref 5–12)
NEUTROPHILS NFR BLD AUTO: 1.37 10*3/MM3 (ref 1.7–7)
NEUTROPHILS NFR BLD AUTO: 38.9 % (ref 42.7–76)
NRBC BLD AUTO-RTO: 0 /100 WBC (ref 0–0.2)
PLATELET # BLD AUTO: 87 10*3/MM3 (ref 140–450)
PMV BLD AUTO: 11.1 FL (ref 6–12)
POTASSIUM SERPL-SCNC: 4.1 MMOL/L (ref 3.5–5.2)
PROT SERPL-MCNC: 6.8 G/DL (ref 6–8.5)
RBC # BLD AUTO: 5.03 10*6/MM3 (ref 3.77–5.28)
SODIUM SERPL-SCNC: 141 MMOL/L (ref 136–145)
WBC # BLD AUTO: 3.52 10*3/MM3 (ref 3.4–10.8)
WHOLE BLOOD HOLD SPECIMEN: NORMAL

## 2021-08-12 PROCEDURE — 25010000002 DIPHENHYDRAMINE PER 50 MG: Performed by: EMERGENCY MEDICINE

## 2021-08-12 PROCEDURE — 85025 COMPLETE CBC W/AUTO DIFF WBC: CPT | Performed by: PHYSICIAN ASSISTANT

## 2021-08-12 PROCEDURE — 71045 X-RAY EXAM CHEST 1 VIEW: CPT

## 2021-08-12 PROCEDURE — 71045 X-RAY EXAM CHEST 1 VIEW: CPT | Performed by: RADIOLOGY

## 2021-08-12 PROCEDURE — 82728 ASSAY OF FERRITIN: CPT | Performed by: PHYSICIAN ASSISTANT

## 2021-08-12 PROCEDURE — 99284 EMERGENCY DEPT VISIT MOD MDM: CPT

## 2021-08-12 PROCEDURE — 80053 COMPREHEN METABOLIC PANEL: CPT | Performed by: PHYSICIAN ASSISTANT

## 2021-08-12 PROCEDURE — 96375 TX/PRO/DX INJ NEW DRUG ADDON: CPT

## 2021-08-12 PROCEDURE — 83615 LACTATE (LD) (LDH) ENZYME: CPT | Performed by: PHYSICIAN ASSISTANT

## 2021-08-12 PROCEDURE — 86140 C-REACTIVE PROTEIN: CPT | Performed by: PHYSICIAN ASSISTANT

## 2021-08-12 PROCEDURE — 25010000002 PROMETHAZINE PER 50 MG: Performed by: EMERGENCY MEDICINE

## 2021-08-12 PROCEDURE — 25010000002 BUTORPHANOL PER 1 MG: Performed by: EMERGENCY MEDICINE

## 2021-08-12 PROCEDURE — 99283 EMERGENCY DEPT VISIT LOW MDM: CPT

## 2021-08-12 PROCEDURE — 96374 THER/PROPH/DIAG INJ IV PUSH: CPT

## 2021-08-12 PROCEDURE — 25010000002 HEPARIN LOCK FLUSH PER 10 UNITS: Performed by: EMERGENCY MEDICINE

## 2021-08-12 RX ORDER — BUTORPHANOL TARTRATE 1 MG/ML
1 INJECTION, SOLUTION INTRAMUSCULAR; INTRAVENOUS ONCE
Status: COMPLETED | OUTPATIENT
Start: 2021-08-12 | End: 2021-08-12

## 2021-08-12 RX ORDER — HEPARIN SODIUM (PORCINE) LOCK FLUSH IV SOLN 100 UNIT/ML 100 UNIT/ML
300 SOLUTION INTRAVENOUS ONCE
Status: COMPLETED | OUTPATIENT
Start: 2021-08-12 | End: 2021-08-12

## 2021-08-12 RX ORDER — DIPHENHYDRAMINE HYDROCHLORIDE 50 MG/ML
12.5 INJECTION INTRAMUSCULAR; INTRAVENOUS ONCE
Status: COMPLETED | OUTPATIENT
Start: 2021-08-12 | End: 2021-08-12

## 2021-08-12 RX ADMIN — BUTORPHANOL TARTRATE 1 MG: 1 INJECTION, SOLUTION INTRAMUSCULAR; INTRAVENOUS at 13:57

## 2021-08-12 RX ADMIN — PROMETHAZINE HYDROCHLORIDE 12.5 MG: 25 INJECTION INTRAMUSCULAR; INTRAVENOUS at 13:56

## 2021-08-12 RX ADMIN — SODIUM CHLORIDE 1000 ML: 9 INJECTION, SOLUTION INTRAVENOUS at 13:58

## 2021-08-12 RX ADMIN — Medication 300 UNITS: at 14:49

## 2021-08-12 RX ADMIN — DIPHENHYDRAMINE HYDROCHLORIDE 12.5 MG: 50 INJECTION INTRAMUSCULAR; INTRAVENOUS at 13:57

## 2021-08-12 NOTE — ED NOTES
MEDICAL SCREENING     Patient initially seen in triage.  The patient was advised further evaluation and diagnostic testing will be needed, some of the treatment and testing will be initiated in the lobby in order to begin the process.  The patient will be returned to the waiting area for the time being and possibly be re-assessed by a subsequent ED provider.  The patient will be brought back to the treatment area in as timely manner as possible.       Han Varghese PA-C  08/12/21 1114

## 2021-08-12 NOTE — ED NOTES
Bryan green PA-C verbalize to discharge pt when fluids and phenergan have infused.      Alvin Huynh RN  08/12/21 0915

## 2021-08-12 NOTE — ED PROVIDER NOTES
Subjective   46-year-old female presents secondary to migraine headache.  Patient states is been present since Monday.  Patient is Covid positive.  She states that she tested positive on Sunday.  She reports that her breathing has been fine.  She has had no fever recently.  She has had nausea vomiting due to her headache.  She states that this feels like her typical migraine.  No neck pain.  No shortness of breath.  She states that she is got a minimal productive cough.  She denies any diarrhea.  No other complaints this time.  Patient states that she is not been able to eat or drink well due to her migraine headache.          Review of Systems   Constitutional: Negative.  Negative for fever.   Respiratory: Negative.    Cardiovascular: Negative.  Negative for chest pain.   Gastrointestinal: Positive for nausea and vomiting. Negative for abdominal pain.   Endocrine: Negative.    Genitourinary: Negative.  Negative for dysuria.   Skin: Negative.    Neurological: Positive for headaches.   Psychiatric/Behavioral: Negative.    All other systems reviewed and are negative.      Past Medical History:   Diagnosis Date   • Abdominal pain    • Abdominal swelling    • Hoyos's disease    • Bipolar 1 disorder (CMS/HCC)    • Brain tumor (benign) (CMS/HCC)    • Brain tumor (CMS/HCC)     R Frontal Lobe per pt   • Brain tumor (CMS/HCC) 2014   • Constipation    • DDD (degenerative disc disease), cervical 05/29/2017   • DDD (degenerative disc disease), cervical    • Diarrhea    • Fibromyalgia    • IBS (irritable bowel syndrome)    • Migraine    • Nausea & vomiting    • PONV (postoperative nausea and vomiting)    • PTSD (post-traumatic stress disorder)    • Rectal bleeding        Allergies   Allergen Reactions   • Ativan [Lorazepam] Hallucinations     confusion   • Sulfa Antibiotics Shortness Of Breath and Swelling   • Sulfa Antibiotics Anaphylaxis   • Reglan [Metoclopramide] Angioedema   • Compazine [Prochlorperazine Edisylate] Hives    • Demerol [Meperidine] Hives   • Droperidol Itching   • Metoclopramide Swelling   • Toradol [Ketorolac Tromethamine] Hives and Itching   • Toradol [Ketorolac Tromethamine] Hives   • Zofran [Ondansetron Hcl] Rash   • Zosyn [Piperacillin Sod-Tazobactam So] Hives       Past Surgical History:   Procedure Laterality Date   • ANAL SCOPE N/A 7/28/2016    Procedure: ANAL SCOPE;  Surgeon: Kael Lopez MD;  Location: [facility] OR;  Service:    • APPENDECTOMY     • COLONOSCOPY N/A 6/30/2016    Procedure: COLONOSCOPY  CPTCODE:04315;  Surgeon: Jose Antonio Belle III, MD;  Location: [facility] OR;  Service:    • COLONOSCOPY N/A 7/7/2016    Procedure: COLONOSCOPY (30906) CPT;  Surgeon: Jose Antonio Belle III, MD;  Location: [facility] OR;  Service:    • CYSTOSCOPY RETROGRADE PYELOGRAM Bilateral 4/28/2017    Procedure: CYSTOSCOPY RETROGRADE PYELOGRAM;  Surgeon: Mu Blunt MD;  Location: [facility] OR;  Service:    • ENDOSCOPY N/A 6/30/2016    Procedure: ESOPHAGOGASTRODUODENOSCOPY WITH BIOPSY  CPTCODE:35626;  Surgeon: Jose Antonio Belle III, MD;  Location: [facility] OR;  Service:    • HEMORRHOIDECTOMY N/A 7/28/2016    Procedure: HEMORRHOID STAPLING;  Surgeon: Kael Lopez MD;  Location: [facility] OR;  Service:    • HYSTERECTOMY     • KNEE SURGERY     • LAPAROSCOPIC SALPINGOOPHERECTOMY     • PORTACATH PLACEMENT N/A 7/28/2017    Procedure: INSERTION OF PORTACATH;  Surgeon: Celso Arredondo MD;  Location: [facility] OR;  Service:    • SHOULDER SURGERY      3 times       Family History   Problem Relation Age of Onset   • Crohn's disease Other    • Hypertension Other    • Diabetes Other    • Irritable bowel syndrome Other    • No Known Problems Father    • No Known Problems Mother        Social History     Socioeconomic History   • Marital status:      Spouse name: Not on file   • Number of children: Not on file   • Years of education: Not on file   • Highest education level: Not on file   Tobacco Use   • Smoking status: Former  Smoker     Packs/day: 1.00     Years: 20.00     Pack years: 20.00     Quit date: 2015     Years since quittin.7   • Smokeless tobacco: Never Used   Substance and Sexual Activity   • Alcohol use: No   • Drug use: Yes     Types: Marijuana     Comment: for pain   • Sexual activity: Defer     Birth control/protection: Surgical           Objective   Physical Exam  Vitals and nursing note reviewed.   Constitutional:       General: She is not in acute distress.     Appearance: She is well-developed. She is not diaphoretic.   HENT:      Head: Normocephalic and atraumatic.      Right Ear: External ear normal.      Left Ear: External ear normal.      Nose: Nose normal.   Eyes:      Conjunctiva/sclera: Conjunctivae normal.      Pupils: Pupils are equal, round, and reactive to light.   Neck:      Vascular: No JVD.      Trachea: No tracheal deviation.   Cardiovascular:      Rate and Rhythm: Normal rate and regular rhythm.      Heart sounds: Normal heart sounds. No murmur heard.     Pulmonary:      Effort: Pulmonary effort is normal. No respiratory distress.      Breath sounds: Normal breath sounds. No wheezing.   Abdominal:      General: Bowel sounds are normal.      Palpations: Abdomen is soft.      Tenderness: There is no abdominal tenderness.   Musculoskeletal:         General: No deformity. Normal range of motion.      Cervical back: Normal range of motion and neck supple.   Skin:     General: Skin is warm and dry.      Coloration: Skin is not pale.      Findings: No erythema or rash.   Neurological:      Mental Status: She is alert and oriented to person, place, and time.      Cranial Nerves: No cranial nerve deficit.   Psychiatric:         Behavior: Behavior normal.         Thought Content: Thought content normal.         Procedures           ED Course  ED Course as of Aug 12 1513   Thu Aug 12, 2021   151 Patient was feeling much better at disposition.  She has been counseled on signs and symptoms worsening along  with appropriate follow-up.  She voiced understanding.    [JI]      ED Course User Index  [JI] Girish Morales PA                                           MDM  Number of Diagnoses or Management Options  COVID-19: established and improving  Migraine with status migrainosus, not intractable, unspecified migraine type: established and worsening     Amount and/or Complexity of Data Reviewed  Clinical lab tests: ordered and reviewed  Tests in the radiology section of CPT®: ordered and reviewed        Final diagnoses:   Migraine with status migrainosus, not intractable, unspecified migraine type   COVID-19       ED Disposition  ED Disposition     ED Disposition Condition Comment    Discharge Stable           Mabel Patterson, APRN  [address]  [facility] [address]  [phone]    In 1 day  if persists    [facility] Emergency Department  [address] [place] [id number] [phone]    If symptoms worsen         Medication List      No changes were made to your prescriptions during this visit.          Girish Morales PA  08/12/21 1512

## 2021-08-16 ENCOUNTER — OFFICE VISIT (OUTPATIENT)
Dept: UROLOGY | Facility: CLINIC | Age: 46
End: 2021-08-16

## 2021-08-16 VITALS — WEIGHT: 120 LBS | HEIGHT: 68 IN | BODY MASS INDEX: 18.19 KG/M2

## 2021-08-16 DIAGNOSIS — N35.028 OTHER POST-TRAUMATIC URETHRAL STRICTURE, FEMALE: ICD-10-CM

## 2021-08-16 DIAGNOSIS — R31.0 GROSS HEMATURIA: ICD-10-CM

## 2021-08-16 DIAGNOSIS — R35.0 FREQUENCY OF MICTURITION: Primary | ICD-10-CM

## 2021-08-16 LAB
BILIRUB BLD-MCNC: NEGATIVE MG/DL
CLARITY, POC: CLEAR
COLOR UR: ABNORMAL
GLUCOSE UR STRIP-MCNC: NEGATIVE MG/DL
KETONES UR QL: NEGATIVE
LEUKOCYTE EST, POC: NEGATIVE
NITRITE UR-MCNC: NEGATIVE MG/ML
PH UR: 6 [PH] (ref 5–8)
PROT UR STRIP-MCNC: NEGATIVE MG/DL
RBC # UR STRIP: ABNORMAL /UL
SP GR UR: 1.01 (ref 1–1.03)
UROBILINOGEN UR QL: NORMAL

## 2021-08-16 PROCEDURE — 99214 OFFICE O/P EST MOD 30 MIN: CPT | Performed by: UROLOGY

## 2021-08-16 PROCEDURE — 53661 DILATION OF URETHRA: CPT | Performed by: UROLOGY

## 2021-08-16 RX ORDER — OXYCODONE AND ACETAMINOPHEN 10; 325 MG/1; MG/1
1 TABLET ORAL EVERY 6 HOURS PRN
Qty: 10 TABLET | Refills: 0 | Status: SHIPPED | OUTPATIENT
Start: 2021-08-16 | End: 2021-09-02 | Stop reason: SDUPTHER

## 2021-08-16 NOTE — PROGRESS NOTES
Chief Complaint:          Chief Complaint   Patient presents with   • Difficulty Urinating       HPI:   46 y.o. female here for emergent dilation.  Having severe pain, urine is negative.      Past Medical History:        Past Medical History:   Diagnosis Date   • Abdominal pain    • Abdominal swelling    • Hoyos's disease    • Bipolar 1 disorder (CMS/HCC)    • Brain tumor (benign) (CMS/HCC)    • Brain tumor (CMS/HCC)     R Frontal Lobe per pt   • Brain tumor (CMS/HCC) 2014   • Constipation    • DDD (degenerative disc disease), cervical 05/29/2017   • DDD (degenerative disc disease), cervical    • Diarrhea    • Fibromyalgia    • IBS (irritable bowel syndrome)    • Migraine    • Nausea & vomiting    • PONV (postoperative nausea and vomiting)    • PTSD (post-traumatic stress disorder)    • Rectal bleeding          Current Meds:     Current Outpatient Medications   Medication Sig Dispense Refill   • albuterol (PROVENTIL HFA;VENTOLIN HFA) 108 (90 Base) MCG/ACT inhaler Inhale 2 puffs 2 (Two) Times a Day.     • Azelastine HCl 137 MCG/SPRAY solution 1 spray into each nostril As Needed (Allergies).     • busPIRone (BUSPAR) 15 MG tablet Take 15 mg by mouth 3 (three) times a day        • cefdinir (OMNICEF) 300 MG capsule Take 1 capsule by mouth 2 (Two) Times a Day. 14 capsule 0   • cefuroxime (CEFTIN) 500 MG tablet      • cyclobenzaprine (FLEXERIL) 5 MG tablet      • diclofenac (VOLTAREN) 1 % gel gel      • divalproex (DEPAKOTE) 500 MG DR tablet Take 1 tablet by mouth 3 (Three) Times a Day. 90 tablet 2   • docusate sodium (COLACE) 100 MG capsule Take 1 capsule by mouth 2 (Two) Times a Day. 20 capsule 0   • fluticasone (VERAMYST) 27.5 MCG/SPRAY nasal spray 2 sprays into each nostril Daily.     • montelukast (SINGULAIR) 10 MG tablet Take 10 mg by mouth Every Night.     • ondansetron (ZOFRAN) 4 MG tablet Take 1 tablet by mouth Every 6 (Six) Hours As Needed for Nausea or Vomiting. 30 tablet 0   • oxyCODONE (ROXICODONE) 5 MG  immediate release tablet      • oxyCODONE-acetaminophen (Percocet)  MG per tablet Take 1 tablet by mouth Every 6 (Six) Hours As Needed for Moderate Pain . 10 tablet 0   • pantoprazole (PROTONIX) 40 MG EC tablet Take 40 mg by mouth 2 (Two) Times a Day As Needed.     • phenazopyridine (PYRIDIUM) 100 MG tablet      • polyethylene glycol (MIRALAX) 17 GM/SCOOP powder Take 17 g by mouth Daily. 225 g 0   • promethazine (PHENERGAN) 25 MG tablet Take 1 tablet by mouth Every 6 (Six) Hours As Needed for Nausea or Vomiting. 12 tablet 0   • sertraline (ZOLOFT) 100 MG tablet Take 100 mg by mouth 2 (Two) Times a Day.     • SUMAtriptan (IMITREX) 6 MG/0.5ML solution injection Inject 6 mg under the skin 1 (One) Time.     • tiZANidine (ZANAFLEX) 4 MG tablet      • traMADol (ULTRAM) 50 MG tablet        No current facility-administered medications for this visit.        Allergies:      Allergies   Allergen Reactions   • Ativan [Lorazepam] Hallucinations     confusion   • Sulfa Antibiotics Shortness Of Breath and Swelling   • Sulfa Antibiotics Anaphylaxis   • Reglan [Metoclopramide] Angioedema   • Compazine [Prochlorperazine Edisylate] Hives   • Demerol [Meperidine] Hives   • Droperidol Itching   • Metoclopramide Swelling   • Toradol [Ketorolac Tromethamine] Hives and Itching   • Toradol [Ketorolac Tromethamine] Hives   • Zofran [Ondansetron Hcl] Rash   • Zosyn [Piperacillin Sod-Tazobactam So] Hives        Past Surgical History:     Past Surgical History:   Procedure Laterality Date   • ANAL SCOPE N/A 7/28/2016    Procedure: ANAL SCOPE;  Surgeon: Kael Lopez MD;  Location:  [facility];  Service:    • APPENDECTOMY     • COLONOSCOPY N/A 6/30/2016    Procedure: COLONOSCOPY  CPTCODE:75401;  Surgeon: Jose Antonio Belle III, MD;  Location:  [facility];  Service:    • COLONOSCOPY N/A 7/7/2016    Procedure: COLONOSCOPY (96203) CPT;  Surgeon: JoseA ntonio Belle III, MD;  Location:  [facility];  Service:    • CYSTOSCOPY RETROGRADE PYELOGRAM  Bilateral 2017    Procedure: CYSTOSCOPY RETROGRADE PYELOGRAM;  Surgeon: Mu Blunt MD;  Location: [facility] OR;  Service:    • ENDOSCOPY N/A 2016    Procedure: ESOPHAGOGASTRODUODENOSCOPY WITH BIOPSY  CPTCODE:25807;  Surgeon: Jose Antonio Belle III, MD;  Location: [facility] OR;  Service:    • HEMORRHOIDECTOMY N/A 2016    Procedure: HEMORRHOID STAPLING;  Surgeon: Kael Lopez MD;  Location: [facility] OR;  Service:    • HYSTERECTOMY     • KNEE SURGERY     • LAPAROSCOPIC SALPINGOOPHERECTOMY     • PORTACATH PLACEMENT N/A 2017    Procedure: INSERTION OF PORTACATH;  Surgeon: Celso Arredondo MD;  Location: [facility] OR;  Service:    • SHOULDER SURGERY      3 times         Social History:     Social History     Socioeconomic History   • Marital status:      Spouse name: Not on file   • Number of children: Not on file   • Years of education: Not on file   • Highest education level: Not on file   Tobacco Use   • Smoking status: Former Smoker     Packs/day: 1.00     Years: 20.00     Pack years: 20.00     Quit date: 2015     Years since quittin.7   • Smokeless tobacco: Never Used   Substance and Sexual Activity   • Alcohol use: No   • Drug use: Yes     Types: Marijuana     Comment: for pain   • Sexual activity: Defer     Birth control/protection: Surgical       Family History:     Family History   Problem Relation Age of Onset   • Crohn's disease Other    • Hypertension Other    • Diabetes Other    • Irritable bowel syndrome Other    • No Known Problems Father    • No Known Problems Mother        Review of Systems:     Review of Systems   Constitutional: Negative.  Negative for activity change, appetite change, chills, diaphoresis, fatigue and unexpected weight change.   HENT: Negative for congestion, dental problem, drooling, ear discharge, ear pain, facial swelling, hearing loss, mouth sores, nosebleeds, postnasal drip, rhinorrhea, sinus pressure, sneezing, sore throat, tinnitus,  trouble swallowing and voice change.    Eyes: Negative.  Negative for photophobia, pain, discharge, redness, itching and visual disturbance.   Respiratory: Negative.  Negative for apnea, cough, choking, chest tightness, shortness of breath, wheezing and stridor.    Cardiovascular: Negative.  Negative for chest pain, palpitations and leg swelling.   Gastrointestinal: Negative.  Negative for abdominal distention, abdominal pain, anal bleeding, blood in stool, constipation, diarrhea, nausea, rectal pain and vomiting.   Endocrine: Negative.  Negative for cold intolerance, heat intolerance, polydipsia, polyphagia and polyuria.   Musculoskeletal: Negative.  Negative for arthralgias, back pain, gait problem, joint swelling, myalgias, neck pain and neck stiffness.   Skin: Negative.  Negative for color change, pallor, rash and wound.   Allergic/Immunologic: Negative.  Negative for environmental allergies, food allergies and immunocompromised state.   Neurological: Negative.  Negative for dizziness, tremors, seizures, syncope, facial asymmetry, speech difficulty, weakness, light-headedness, numbness and headaches.   Hematological: Negative.  Negative for adenopathy. Does not bruise/bleed easily.   Psychiatric/Behavioral: Negative for agitation, behavioral problems, confusion, decreased concentration, dysphoric mood, hallucinations, self-injury, sleep disturbance and suicidal ideas. The patient is not nervous/anxious and is not hyperactive.    All other systems reviewed and are negative.      Physical Exam:     Physical Exam  Constitutional:       Appearance: She is well-developed.   HENT:      Head: Normocephalic and atraumatic.      Right Ear: External ear normal.      Left Ear: External ear normal.   Eyes:      Conjunctiva/sclera: Conjunctivae normal.      Pupils: Pupils are equal, round, and reactive to light.   Cardiovascular:      Rate and Rhythm: Normal rate and regular rhythm.      Heart sounds: Normal heart sounds.    Pulmonary:      Effort: Pulmonary effort is normal.      Breath sounds: Normal breath sounds.   Abdominal:      General: Bowel sounds are normal. There is no distension.      Palpations: Abdomen is soft. There is no mass.      Tenderness: There is no abdominal tenderness. There is no guarding or rebound.   Genitourinary:     General: Normal vulva.      Vagina: No vaginal discharge.   Musculoskeletal:         General: Normal range of motion.   Skin:     General: Skin is warm and dry.   Neurological:      Mental Status: She is alert.      Deep Tendon Reflexes: Reflexes are normal and symmetric.   Psychiatric:         Behavior: Behavior normal.         Thought Content: Thought content normal.         Judgment: Judgment normal.         I have reviewed the following portions of the patient's history: allergies, current medications, past family history, past medical history, past social history, past surgical history, problem list and ROS and confirm it's accurate.      Procedure:     Urethral dilation-after an appropriate informed consent the patient was brought to the procedure suite.  The urethra was gently anesthetized with 10 cc of 2% viscous Xylocaine jelly.  After an adequate period of topical anesthesia I went ahead and a dilated with [place] sounds from 16-28 Serbian sequentially without complication the patient was given gentamicin as prophylaxis with 80 mg  Assessment/Plan:   Urethral stricture-status post urgent dilation.  Having significant pain has been getting the wrong catheters from the catheter company we have had multiple calls to attempt to stop the monthly 30 catheter is arriving and that we have been unsuccessful  Narcotic pain medication-patient has significant acute pain that I believe would be an indication for the use of narcotic pain medication.  I discussed the significant risks of pain medication and the fact that this will be a short only option and I will give her no more than a three-day  supply of pain medication.  And I will not plan long-term medication and that this will be sent to a pain clinic if at all becomes necessary.  We discussed signing a pain medication agreement and the fact that we're going to run a state LUIS ALBERTO review to be sure the patient is not getting pain medication from elsewhere.  If this is the case we will not give pain medication.  As part of the patient's treatment plan of there being prescribed a controlled substance with potential for abuse.  This patient has been wait aware of the appropriate dose of such medications including, the risk for somnolence, limited ability to drive and/or safety and the significant potential for overdose.  It is been made clear that these medications are for the prescribed patient only without concomitant use of alcohol or other sepsis unless prescribed by the medical provider.  Has completed prescribing agreement detailing the terms of continue prescribing him a controlled substance.  Including monitoring Luis Alberto ports, the possibility of urine drug screens and pedal counts.  The patient is aware that we reviewed LUIS ALBERTO reports on a regular basis and scan them into the chart.  History and physical examination exhibited continued safe and appropriate use of controlled substances.  Also discussed the fact that the new [place] legislation allows only a three-day prescription for pain medication.  In this situation he will be referred to a chronic pain clinic.                  This document has been electronically signed by VERENICE FINK MD August 16, 2021 14:04 EDT

## 2021-08-20 ENCOUNTER — HOSPITAL ENCOUNTER (EMERGENCY)
Facility: HOSPITAL | Age: 46
Discharge: LEFT BEFORE DISCHARGE | End: 2021-08-20
Admitting: STUDENT IN AN ORGANIZED HEALTH CARE EDUCATION/TRAINING PROGRAM

## 2021-08-20 VITALS
OXYGEN SATURATION: 97 % | WEIGHT: 126 LBS | BODY MASS INDEX: 19.1 KG/M2 | SYSTOLIC BLOOD PRESSURE: 117 MMHG | HEIGHT: 68 IN | HEART RATE: 101 BPM | RESPIRATION RATE: 18 BRPM | DIASTOLIC BLOOD PRESSURE: 77 MMHG | TEMPERATURE: 98.6 F

## 2021-08-20 PROCEDURE — 99281 EMR DPT VST MAYX REQ PHY/QHP: CPT

## 2021-08-30 ENCOUNTER — HOSPITAL ENCOUNTER (EMERGENCY)
Facility: HOSPITAL | Age: 46
Discharge: HOME | End: 2021-08-30
Attending: STUDENT IN AN ORGANIZED HEALTH CARE EDUCATION/TRAINING PROGRAM | Admitting: STUDENT IN AN ORGANIZED HEALTH CARE EDUCATION/TRAINING PROGRAM

## 2021-08-30 ENCOUNTER — APPOINTMENT (OUTPATIENT)
Dept: GENERAL RADIOLOGY | Facility: HOSPITAL | Age: 46
End: 2021-08-30

## 2021-08-30 VITALS
OXYGEN SATURATION: 98 % | TEMPERATURE: 98.9 F | WEIGHT: 124 LBS | HEART RATE: 95 BPM | RESPIRATION RATE: 20 BRPM | BODY MASS INDEX: 18.79 KG/M2 | SYSTOLIC BLOOD PRESSURE: 146 MMHG | HEIGHT: 68 IN | DIASTOLIC BLOOD PRESSURE: 72 MMHG

## 2021-08-30 DIAGNOSIS — S46.912A STRAIN OF LEFT SHOULDER, INITIAL ENCOUNTER: Primary | ICD-10-CM

## 2021-08-30 PROCEDURE — 73030 X-RAY EXAM OF SHOULDER: CPT

## 2021-08-30 PROCEDURE — 99283 EMERGENCY DEPT VISIT LOW MDM: CPT

## 2021-08-30 RX ORDER — HYDROCODONE BITARTRATE AND ACETAMINOPHEN 5; 325 MG/1; MG/1
1 TABLET ORAL ONCE
Status: COMPLETED | OUTPATIENT
Start: 2021-08-30 | End: 2021-08-30

## 2021-08-30 RX ADMIN — HYDROCODONE BITARTRATE AND ACETAMINOPHEN 1 TABLET: 5; 325 TABLET ORAL at 22:43

## 2021-09-02 ENCOUNTER — OFFICE VISIT (OUTPATIENT)
Dept: UROLOGY | Facility: CLINIC | Age: 46
End: 2021-09-02

## 2021-09-02 ENCOUNTER — TELEPHONE (OUTPATIENT)
Dept: UROLOGY | Facility: CLINIC | Age: 46
End: 2021-09-02

## 2021-09-02 VITALS — HEIGHT: 68 IN | BODY MASS INDEX: 15.31 KG/M2 | WEIGHT: 101 LBS

## 2021-09-02 DIAGNOSIS — R31.0 GROSS HEMATURIA: ICD-10-CM

## 2021-09-02 DIAGNOSIS — N35.028 OTHER POST-TRAUMATIC URETHRAL STRICTURE, FEMALE: ICD-10-CM

## 2021-09-02 DIAGNOSIS — Z48.816 AFTERCARE FOLLOWING SURGERY OF THE GENITOURINARY SYSTEM: Primary | ICD-10-CM

## 2021-09-02 PROCEDURE — 53661 DILATION OF URETHRA: CPT | Performed by: UROLOGY

## 2021-09-02 PROCEDURE — 96372 THER/PROPH/DIAG INJ SC/IM: CPT | Performed by: UROLOGY

## 2021-09-02 RX ORDER — GENTAMICIN SULFATE 40 MG/ML
80 INJECTION, SOLUTION INTRAMUSCULAR; INTRAVENOUS ONCE
Status: COMPLETED | OUTPATIENT
Start: 2021-09-02 | End: 2021-09-02

## 2021-09-02 RX ORDER — OXYCODONE AND ACETAMINOPHEN 10; 325 MG/1; MG/1
1 TABLET ORAL EVERY 6 HOURS PRN
Qty: 10 TABLET | Refills: 0 | Status: SHIPPED | OUTPATIENT
Start: 2021-09-02 | End: 2021-09-24 | Stop reason: SDUPTHER

## 2021-09-02 RX ADMIN — GENTAMICIN SULFATE 80 MG: 40 INJECTION, SOLUTION INTRAMUSCULAR; INTRAVENOUS at 11:23

## 2021-09-02 NOTE — TELEPHONE ENCOUNTER
Pt stated during her visit that she was not receiving her catheter supplies. I contacted the cath rep who said that she mailed them out to her on 6/30 and was not eligible for more until 9/30 and said she tried to contact the pt at her old [facility] and ask if she is cathing more than 4 times per day. I gave her the new number including her husbands number and her address in case it was incorrect as well.

## 2021-09-02 NOTE — PROGRESS NOTES
Chief Complaint:          Chief Complaint   Patient presents with   • Dialation       HPI:   46 y.o. female with severe urethral stricture disease presents for dilation.  Having significant pain the urethra is very tight.  She has been the emergency room once already.      Past Medical History:        Past Medical History:   Diagnosis Date   • Abdominal pain    • Abdominal swelling    • Hoyos's disease    • Bipolar 1 disorder (CMS/HCC)    • Brain tumor (benign) (CMS/HCC)    • Brain tumor (CMS/HCC)     R Frontal Lobe per pt   • Brain tumor (CMS/HCC) 2014   • Constipation    • DDD (degenerative disc disease), cervical 05/29/2017   • DDD (degenerative disc disease), cervical    • Diarrhea    • Fibromyalgia    • IBS (irritable bowel syndrome)    • Migraine    • Nausea & vomiting    • PONV (postoperative nausea and vomiting)    • PTSD (post-traumatic stress disorder)    • Rectal bleeding          Current Meds:     Current Outpatient Medications   Medication Sig Dispense Refill   • albuterol (PROVENTIL HFA;VENTOLIN HFA) 108 (90 Base) MCG/ACT inhaler Inhale 2 puffs 2 (Two) Times a Day.     • Azelastine HCl 137 MCG/SPRAY solution 1 spray into each nostril As Needed (Allergies).     • busPIRone (BUSPAR) 15 MG tablet Take 15 mg by mouth 3 (three) times a day        • cefdinir (OMNICEF) 300 MG capsule Take 1 capsule by mouth 2 (Two) Times a Day. 14 capsule 0   • cefuroxime (CEFTIN) 500 MG tablet      • cyclobenzaprine (FLEXERIL) 5 MG tablet      • diclofenac (VOLTAREN) 1 % gel gel      • divalproex (DEPAKOTE) 500 MG DR tablet Take 1 tablet by mouth 3 (Three) Times a Day. 90 tablet 2   • docusate sodium (COLACE) 100 MG capsule Take 1 capsule by mouth 2 (Two) Times a Day. 20 capsule 0   • fluticasone (VERAMYST) 27.5 MCG/SPRAY nasal spray 2 sprays into each nostril Daily.     • montelukast (SINGULAIR) 10 MG tablet Take 10 mg by mouth Every Night.     • ondansetron (ZOFRAN) 4 MG tablet Take 1 tablet by mouth Every 6 (Six) Hours  As Needed for Nausea or Vomiting. 30 tablet 0   • oxyCODONE (ROXICODONE) 5 MG immediate release tablet      • oxyCODONE-acetaminophen (Percocet)  MG per tablet Take 1 tablet by mouth Every 6 (Six) Hours As Needed for Moderate Pain . 10 tablet 0   • pantoprazole (PROTONIX) 40 MG EC tablet Take 40 mg by mouth 2 (Two) Times a Day As Needed.     • phenazopyridine (PYRIDIUM) 100 MG tablet      • polyethylene glycol (MIRALAX) 17 GM/SCOOP powder Take 17 g by mouth Daily. 225 g 0   • promethazine (PHENERGAN) 25 MG tablet Take 1 tablet by mouth Every 6 (Six) Hours As Needed for Nausea or Vomiting. 12 tablet 0   • sertraline (ZOLOFT) 100 MG tablet Take 100 mg by mouth 2 (Two) Times a Day.     • SUMAtriptan (IMITREX) 6 MG/0.5ML solution injection Inject 6 mg under the skin 1 (One) Time.     • tiZANidine (ZANAFLEX) 4 MG tablet      • traMADol (ULTRAM) 50 MG tablet        No current facility-administered medications for this visit.        Allergies:      Allergies   Allergen Reactions   • Ativan [Lorazepam] Hallucinations     confusion   • Sulfa Antibiotics Shortness Of Breath and Swelling   • Sulfa Antibiotics Anaphylaxis   • Reglan [Metoclopramide] Angioedema   • Compazine [Prochlorperazine Edisylate] Hives   • Demerol [Meperidine] Hives   • Droperidol Itching   • Metoclopramide Swelling   • Toradol [Ketorolac Tromethamine] Hives and Itching   • Toradol [Ketorolac Tromethamine] Hives   • Zofran [Ondansetron Hcl] Rash   • Zosyn [Piperacillin Sod-Tazobactam So] Hives        Past Surgical History:     Past Surgical History:   Procedure Laterality Date   • ANAL SCOPE N/A 7/28/2016    Procedure: ANAL SCOPE;  Surgeon: Kael Lopez MD;  Location:  [facility];  Service:    • APPENDECTOMY     • COLONOSCOPY N/A 6/30/2016    Procedure: COLONOSCOPY  CPTCODE:48507;  Surgeon: Jose Antonio Belle III, MD;  Location:  [facility];  Service:    • COLONOSCOPY N/A 7/7/2016    Procedure: COLONOSCOPY (19208) CPT;  Surgeon: Jose Antonio Belle  III, MD;  Location: [facility];  Service:    • CYSTOSCOPY RETROGRADE PYELOGRAM Bilateral 2017    Procedure: CYSTOSCOPY RETROGRADE PYELOGRAM;  Surgeon: Mu Blunt MD;  Location: [facility];  Service:    • ENDOSCOPY N/A 2016    Procedure: ESOPHAGOGASTRODUODENOSCOPY WITH BIOPSY  CPTCODE:17009;  Surgeon: Jose Antonio Belle III, MD;  Location: [facility];  Service:    • HEMORRHOIDECTOMY N/A 2016    Procedure: HEMORRHOID STAPLING;  Surgeon: Kael Lopez MD;  Location: [facility];  Service:    • HYSTERECTOMY     • KNEE SURGERY     • LAPAROSCOPIC SALPINGOOPHERECTOMY     • PORTACATH PLACEMENT N/A 2017    Procedure: INSERTION OF PORTACATH;  Surgeon: Celso Arredondo MD;  Location: [facility];  Service:    • SHOULDER SURGERY      3 times         Social History:     Social History     Socioeconomic History   • Marital status:      Spouse name: Not on file   • Number of children: Not on file   • Years of education: Not on file   • Highest education level: Not on file   Tobacco Use   • Smoking status: Former Smoker     Packs/day: 1.00     Years: 20.00     Pack years: 20.00     Quit date: 2015     Years since quittin.8   • Smokeless tobacco: Never Used   Substance and Sexual Activity   • Alcohol use: No   • Drug use: Yes     Types: Marijuana     Comment: for pain   • Sexual activity: Defer     Birth control/protection: Surgical       Family History:     Family History   Problem Relation Age of Onset   • Crohn's disease Other    • Hypertension Other    • Diabetes Other    • Irritable bowel syndrome Other    • No Known Problems Father    • No Known Problems Mother        Review of Systems:     Review of Systems   Constitutional: Negative.  Negative for activity change, appetite change, chills, diaphoresis, fatigue and unexpected weight change.   HENT: Negative for congestion, dental problem, drooling, ear discharge, ear pain, facial swelling, hearing loss, mouth sores, nosebleeds,  postnasal drip, rhinorrhea, sinus pressure, sneezing, sore throat, tinnitus, trouble swallowing and voice change.    Eyes: Negative.  Negative for photophobia, pain, discharge, redness, itching and visual disturbance.   Respiratory: Negative.  Negative for apnea, cough, choking, chest tightness, shortness of breath, wheezing and stridor.    Cardiovascular: Negative.  Negative for chest pain, palpitations and leg swelling.   Gastrointestinal: Negative.  Negative for abdominal distention, abdominal pain, anal bleeding, blood in stool, constipation, diarrhea, nausea, rectal pain and vomiting.   Endocrine: Negative.  Negative for cold intolerance, heat intolerance, polydipsia, polyphagia and polyuria.   Genitourinary: Positive for difficulty urinating.   Musculoskeletal: Negative.  Negative for arthralgias, back pain, gait problem, joint swelling, myalgias, neck pain and neck stiffness.   Skin: Negative.  Negative for color change, pallor, rash and wound.   Allergic/Immunologic: Negative.  Negative for environmental allergies, food allergies and immunocompromised state.   Neurological: Negative.  Negative for dizziness, tremors, seizures, syncope, facial asymmetry, speech difficulty, weakness, light-headedness, numbness and headaches.   Hematological: Negative.  Negative for adenopathy. Does not bruise/bleed easily.   Psychiatric/Behavioral: Negative for agitation, behavioral problems, confusion, decreased concentration, dysphoric mood, hallucinations, self-injury, sleep disturbance and suicidal ideas. The patient is not nervous/anxious and is not hyperactive.    All other systems reviewed and are negative.      Physical Exam:     Physical Exam  Constitutional:       Appearance: She is well-developed.   HENT:      Head: Normocephalic and atraumatic.      Right Ear: External ear normal.      Left Ear: External ear normal.   Eyes:      Conjunctiva/sclera: Conjunctivae normal.      Pupils: Pupils are equal, round, and  reactive to light.   Cardiovascular:      Rate and Rhythm: Normal rate and regular rhythm.      Heart sounds: Normal heart sounds.   Pulmonary:      Effort: Pulmonary effort is normal.      Breath sounds: Normal breath sounds.   Abdominal:      General: Bowel sounds are normal. There is no distension.      Palpations: Abdomen is soft. There is no mass.      Tenderness: There is no abdominal tenderness. There is no guarding or rebound.   Genitourinary:     General: Normal vulva.      Vagina: No vaginal discharge.      Comments: Very thin and ill  Musculoskeletal:         General: Normal range of motion.   Skin:     General: Skin is warm and dry.   Neurological:      Mental Status: She is alert.      Deep Tendon Reflexes: Reflexes are normal and symmetric.   Psychiatric:         Behavior: Behavior normal.         Thought Content: Thought content normal.         Judgment: Judgment normal.         I have reviewed the following portions of the patient's history: allergies, current medications, past family history, past medical history, past social history, past surgical history, problem list and ROS and confirm it's accurate.      Procedure:   Urethral dilation-after an appropriate informed consent the patient was brought to the procedure suite.  The urethra was gently anesthetized with 10 cc of 2% viscous Xylocaine jelly.  After an adequate period of topical anesthesia I went ahead and dilated with [place] sounds from 16-26 Armenian sequentially without complication the patient was given gentamicin as prophylaxis with 80 mg    Assessment/Plan:   Urethral stricture post infective status post dilatation extremely tight  Narcotic pain medication-patient has significant acute pain that I believe would be an indication for the use of narcotic pain medication.  I discussed the significant risks of pain medication and the fact that this will be a short only option and I will give her no more than a three-day supply of pain  medication.  And I will not plan long-term medication and that this will be sent to a pain clinic if at all becomes necessary.  We discussed signing a pain medication agreement and the fact that we're going to run a state LUIS ALBERTO review to be sure the patient is not getting pain medication from elsewhere.  If this is the case we will not give pain medication.  As part of the patient's treatment plan of there being prescribed a controlled substance with potential for abuse.  This patient has been wait aware of the appropriate dose of such medications including, the risk for somnolence, limited ability to drive and/or safety and the significant potential for overdose.  It is been made clear that these medications are for the prescribed patient only without concomitant use of alcohol or other sepsis unless prescribed by the medical provider.  Has completed prescribing agreement detailing the terms of continue prescribing him a controlled substance.  Including monitoring Luis Alberto ports, the possibility of urine drug screens and pedal counts.  The patient is aware that we reviewed LUIS ALBERTO reports on a regular basis and scan them into the chart.  History and physical examination exhibited continued safe and appropriate use of controlled substances.  Also discussed the fact that the new [place] legislation allows only a three-day prescription for pain medication.  In this situation he will be referred to a chronic pain clinic.                This document has been electronically signed by VERENICE FINK MD September 2, 2021 11:12 EDT

## 2021-09-24 ENCOUNTER — OFFICE VISIT (OUTPATIENT)
Dept: UROLOGY | Facility: CLINIC | Age: 46
End: 2021-09-24

## 2021-09-24 VITALS — HEIGHT: 69 IN | WEIGHT: 101 LBS | BODY MASS INDEX: 14.96 KG/M2

## 2021-09-24 DIAGNOSIS — Z48.816 AFTERCARE FOLLOWING SURGERY OF THE GENITOURINARY SYSTEM: Primary | ICD-10-CM

## 2021-09-24 DIAGNOSIS — R31.0 GROSS HEMATURIA: ICD-10-CM

## 2021-09-24 DIAGNOSIS — N35.028 OTHER POST-TRAUMATIC URETHRAL STRICTURE, FEMALE: ICD-10-CM

## 2021-09-24 PROCEDURE — 96372 THER/PROPH/DIAG INJ SC/IM: CPT | Performed by: UROLOGY

## 2021-09-24 PROCEDURE — 53661 DILATION OF URETHRA: CPT | Performed by: UROLOGY

## 2021-09-24 RX ORDER — OXYCODONE AND ACETAMINOPHEN 10; 325 MG/1; MG/1
1 TABLET ORAL EVERY 6 HOURS PRN
Qty: 10 TABLET | Refills: 0 | OUTPATIENT
Start: 2021-09-24 | End: 2021-10-01

## 2021-09-24 RX ORDER — GENTAMICIN SULFATE 40 MG/ML
80 INJECTION, SOLUTION INTRAMUSCULAR; INTRAVENOUS ONCE
Status: COMPLETED | OUTPATIENT
Start: 2021-09-24 | End: 2021-09-24

## 2021-09-24 RX ADMIN — GENTAMICIN SULFATE 80 MG: 40 INJECTION, SOLUTION INTRAMUSCULAR; INTRAVENOUS at 14:31

## 2021-09-24 NOTE — PROGRESS NOTES
Chief Complaint:          Chief Complaint   Patient presents with   • Stricture     follow up        HPI:   46 y.o. female.  Very well-known to me with a postprocedural urethral stricture here for dilation.    Past Medical History:        Past Medical History:   Diagnosis Date   • Abdominal pain    • Abdominal swelling    • Hoyos's disease    • Bipolar 1 disorder (CMS/HCC)    • Brain tumor (benign) (CMS/HCC)    • Brain tumor (CMS/HCC)     R Frontal Lobe per pt   • Brain tumor (CMS/HCC) 2014   • Constipation    • DDD (degenerative disc disease), cervical 05/29/2017   • DDD (degenerative disc disease), cervical    • Diarrhea    • Fibromyalgia    • IBS (irritable bowel syndrome)    • Migraine    • Nausea & vomiting    • PONV (postoperative nausea and vomiting)    • PTSD (post-traumatic stress disorder)    • Rectal bleeding          Current Meds:     Current Outpatient Medications   Medication Sig Dispense Refill   • albuterol (PROVENTIL HFA;VENTOLIN HFA) 108 (90 Base) MCG/ACT inhaler Inhale 2 puffs 2 (Two) Times a Day.     • Azelastine HCl 137 MCG/SPRAY solution 1 spray into each nostril As Needed (Allergies).     • busPIRone (BUSPAR) 15 MG tablet Take 15 mg by mouth 3 (three) times a day        • cyclobenzaprine (FLEXERIL) 5 MG tablet      • diclofenac (VOLTAREN) 1 % gel gel      • divalproex (DEPAKOTE) 500 MG DR tablet Take 1 tablet by mouth 3 (Three) Times a Day. 90 tablet 2   • docusate sodium (COLACE) 100 MG capsule Take 1 capsule by mouth 2 (Two) Times a Day. 20 capsule 0   • fluticasone (VERAMYST) 27.5 MCG/SPRAY nasal spray 2 sprays into each nostril Daily.     • montelukast (SINGULAIR) 10 MG tablet Take 10 mg by mouth Every Night.     • ondansetron (ZOFRAN) 4 MG tablet Take 1 tablet by mouth Every 6 (Six) Hours As Needed for Nausea or Vomiting. 30 tablet 0   • oxyCODONE-acetaminophen (Percocet)  MG per tablet Take 1 tablet by mouth Every 6 (Six) Hours As Needed for Moderate Pain . 10 tablet 0   •  pantoprazole (PROTONIX) 40 MG EC tablet Take 40 mg by mouth 2 (Two) Times a Day As Needed.     • phenazopyridine (PYRIDIUM) 100 MG tablet      • polyethylene glycol (MIRALAX) 17 GM/SCOOP powder Take 17 g by mouth Daily. 225 g 0   • promethazine (PHENERGAN) 25 MG tablet Take 1 tablet by mouth Every 6 (Six) Hours As Needed for Nausea or Vomiting. 12 tablet 0   • sertraline (ZOLOFT) 100 MG tablet Take 100 mg by mouth 2 (Two) Times a Day.     • SUMAtriptan (IMITREX) 6 MG/0.5ML solution injection Inject 6 mg under the skin 1 (One) Time.     • tiZANidine (ZANAFLEX) 4 MG tablet      • traMADol (ULTRAM) 50 MG tablet        No current facility-administered medications for this visit.        Allergies:      Allergies   Allergen Reactions   • Ativan [Lorazepam] Hallucinations     confusion   • Sulfa Antibiotics Shortness Of Breath and Swelling   • Sulfa Antibiotics Anaphylaxis   • Reglan [Metoclopramide] Angioedema   • Compazine [Prochlorperazine Edisylate] Hives   • Demerol [Meperidine] Hives   • Droperidol Itching   • Metoclopramide Swelling   • Toradol [Ketorolac Tromethamine] Hives and Itching   • Toradol [Ketorolac Tromethamine] Hives   • Zofran [Ondansetron Hcl] Rash   • Zosyn [Piperacillin Sod-Tazobactam So] Hives        Past Surgical History:     Past Surgical History:   Procedure Laterality Date   • ANAL SCOPE N/A 7/28/2016    Procedure: ANAL SCOPE;  Surgeon: Kael Lopez MD;  Location: [facility] OR;  Service:    • APPENDECTOMY     • COLONOSCOPY N/A 6/30/2016    Procedure: COLONOSCOPY  CPTCODE:56004;  Surgeon: Jose Antonio Belle III, MD;  Location: [facility] OR;  Service:    • COLONOSCOPY N/A 7/7/2016    Procedure: COLONOSCOPY (60220) CPT;  Surgeon: Jose Antonio Belle III, MD;  Location: [facility] OR;  Service:    • CYSTOSCOPY RETROGRADE PYELOGRAM Bilateral 4/28/2017    Procedure: CYSTOSCOPY RETROGRADE PYELOGRAM;  Surgeon: Mu Blunt MD;  Location: [facility] OR;  Service:    • ENDOSCOPY N/A 6/30/2016     Procedure: ESOPHAGOGASTRODUODENOSCOPY WITH BIOPSY  CPTCODE:10296;  Surgeon: Jose Antonio Belle III, MD;  Location: [facility];  Service:    • HEMORRHOIDECTOMY N/A 2016    Procedure: HEMORRHOID STAPLING;  Surgeon: Kael Lopez MD;  Location: [facility];  Service:    • HYSTERECTOMY     • KNEE SURGERY     • LAPAROSCOPIC SALPINGOOPHERECTOMY     • PORTACATH PLACEMENT N/A 2017    Procedure: INSERTION OF PORTACATH;  Surgeon: Celso Arredondo MD;  Location: [facility];  Service:    • SHOULDER SURGERY      3 times         Social History:     Social History     Socioeconomic History   • Marital status:      Spouse name: Not on file   • Number of children: Not on file   • Years of education: Not on file   • Highest education level: Not on file   Tobacco Use   • Smoking status: Former Smoker     Packs/day: 1.00     Years: 20.00     Pack years: 20.00     Quit date: 2015     Years since quittin.9   • Smokeless tobacco: Never Used   Substance and Sexual Activity   • Alcohol use: No   • Drug use: Yes     Types: Marijuana     Comment: for pain   • Sexual activity: Defer     Birth control/protection: Surgical       Family History:     Family History   Problem Relation Age of Onset   • Crohn's disease Other    • Hypertension Other    • Diabetes Other    • Irritable bowel syndrome Other    • No Known Problems Father    • No Known Problems Mother        Review of Systems:     Review of Systems   Constitutional: Negative.  Negative for activity change, appetite change, chills, diaphoresis, fatigue and unexpected weight change.   HENT: Negative for congestion, dental problem, drooling, ear discharge, ear pain, facial swelling, hearing loss, mouth sores, nosebleeds, postnasal drip, rhinorrhea, sinus pressure, sneezing, sore throat, tinnitus, trouble swallowing and voice change.    Eyes: Negative.  Negative for photophobia, pain, discharge, redness, itching and visual disturbance.   Respiratory: Negative.   Negative for apnea, cough, choking, chest tightness, shortness of breath, wheezing and stridor.    Cardiovascular: Negative.  Negative for chest pain, palpitations and leg swelling.   Gastrointestinal: Negative.  Negative for abdominal distention, abdominal pain, anal bleeding, blood in stool, constipation, diarrhea, nausea, rectal pain and vomiting.   Endocrine: Negative.  Negative for cold intolerance, heat intolerance, polydipsia, polyphagia and polyuria.   Genitourinary: Positive for difficulty urinating.   Musculoskeletal: Negative.  Negative for arthralgias, back pain, gait problem, joint swelling, myalgias, neck pain and neck stiffness.   Skin: Negative.  Negative for color change, pallor, rash and wound.   Allergic/Immunologic: Negative.  Negative for environmental allergies, food allergies and immunocompromised state.   Neurological: Negative.  Negative for dizziness, tremors, seizures, syncope, facial asymmetry, speech difficulty, weakness, light-headedness, numbness and headaches.   Hematological: Negative.  Negative for adenopathy. Does not bruise/bleed easily.   Psychiatric/Behavioral: Negative for agitation, behavioral problems, confusion, decreased concentration, dysphoric mood, hallucinations, self-injury, sleep disturbance and suicidal ideas. The patient is not nervous/anxious and is not hyperactive.    All other systems reviewed and are negative.      Physical Exam:     Physical Exam  Constitutional:       Appearance: She is well-developed.   HENT:      Head: Normocephalic and atraumatic.      Right Ear: External ear normal.      Left Ear: External ear normal.   Eyes:      Conjunctiva/sclera: Conjunctivae normal.      Pupils: Pupils are equal, round, and reactive to light.   Cardiovascular:      Rate and Rhythm: Normal rate and regular rhythm.      Heart sounds: Normal heart sounds.   Pulmonary:      Effort: Pulmonary effort is normal.      Breath sounds: Normal breath sounds.   Abdominal:       General: Bowel sounds are normal. There is no distension.      Palpations: Abdomen is soft. There is no mass.      Tenderness: There is no abdominal tenderness. There is no guarding or rebound.   Genitourinary:     Vagina: No vaginal discharge.   Musculoskeletal:         General: Normal range of motion.   Skin:     General: Skin is warm and dry.   Neurological:      Mental Status: She is alert.      Deep Tendon Reflexes: Reflexes are normal and symmetric.   Psychiatric:         Behavior: Behavior normal.         Thought Content: Thought content normal.         Judgment: Judgment normal.         I have reviewed the following portions of the patient's history: allergies, current medications, past family history, past medical history, past social history, past surgical history, problem list and ROS and confirm it's accurate.      Procedure:   Urethral dilation-after an appropriate informed consent the patient was brought to the procedure suite.  The urethra was gently anesthetized with 10 cc of 2% viscous Xylocaine jelly.  After an adequate period of topical anesthesia I went ahead and dilated with [place] sounds from 16-30 Portuguese sequentially without complication the patient was given gentamicin as prophylaxis with 80 mg    Assessment/Plan:   Urethral stricture-status post dilation, severe postprocedural stricture  Narcotic pain medication-patient has significant acute pain that I believe would be an indication for the use of narcotic pain medication.  I discussed the significant risks of pain medication and the fact that this will be a short only option and I will give her no more than a three-day supply of pain medication.  And I will not plan long-term medication and that this will be sent to a pain clinic if at all becomes necessary.  We discussed signing a pain medication agreement and the fact that we're going to run a state [facility] review to be sure the patient is not getting pain medication from elsewhere.   If this is the case we will not give pain medication.  As part of the patient's treatment plan of there being prescribed a controlled substance with potential for abuse.  This patient has been wait aware of the appropriate dose of such medications including, the risk for somnolence, limited ability to drive and/or safety and the significant potential for overdose.  It is been made clear that these medications are for the prescribed patient only without concomitant use of alcohol or other sepsis unless prescribed by the medical provider.  Has completed prescribing agreement detailing the terms of continue prescribing him a controlled substance.  Including monitoring Luis Alberto ports, the possibility of urine drug screens and pedal counts.  The patient is aware that we reviewed LUIS ALBERTO reports on a regular basis and scan them into the chart.  History and physical examination exhibited continued safe and appropriate use of controlled substances.  Also discussed the fact that the new [place] legislation allows only a three-day prescription for pain medication.  In this situation he will be referred to a chronic pain clinic.                This document has been electronically signed by VERENICE FINK MD September 24, 2021 14:01 EDT

## 2021-10-01 ENCOUNTER — APPOINTMENT (OUTPATIENT)
Dept: CT IMAGING | Facility: HOSPITAL | Age: 46
End: 2021-10-01

## 2021-10-01 ENCOUNTER — HOSPITAL ENCOUNTER (EMERGENCY)
Facility: HOSPITAL | Age: 46
Discharge: HOME | End: 2021-10-01
Attending: FAMILY MEDICINE | Admitting: FAMILY MEDICINE

## 2021-10-01 VITALS
RESPIRATION RATE: 16 BRPM | OXYGEN SATURATION: 97 % | TEMPERATURE: 98.2 F | HEART RATE: 90 BPM | WEIGHT: 123 LBS | BODY MASS INDEX: 18.64 KG/M2 | SYSTOLIC BLOOD PRESSURE: 138 MMHG | HEIGHT: 68 IN | DIASTOLIC BLOOD PRESSURE: 70 MMHG

## 2021-10-01 DIAGNOSIS — R31.9 HEMATURIA, UNSPECIFIED TYPE: ICD-10-CM

## 2021-10-01 DIAGNOSIS — N23 RENAL COLIC: Primary | ICD-10-CM

## 2021-10-01 LAB
ALBUMIN SERPL-MCNC: 4.62 G/DL (ref 3.5–5.2)
ALBUMIN/GLOB SERPL: 1.9 G/DL
ALP SERPL-CCNC: 79 U/L (ref 39–117)
ALT SERPL W P-5'-P-CCNC: 37 U/L (ref 1–33)
ANION GAP SERPL CALCULATED.3IONS-SCNC: 10.5 MMOL/L (ref 5–15)
AST SERPL-CCNC: 39 U/L (ref 1–32)
BACTERIA UR QL AUTO: ABNORMAL /HPF
BASOPHILS # BLD AUTO: 0.01 10*3/MM3 (ref 0–0.2)
BASOPHILS NFR BLD AUTO: 0.2 % (ref 0–1.5)
BILIRUB SERPL-MCNC: 0.7 MG/DL (ref 0–1.2)
BILIRUB UR QL STRIP: NEGATIVE
BUN SERPL-MCNC: 6 MG/DL (ref 6–20)
BUN/CREAT SERPL: 8.2 (ref 7–25)
CALCIUM SPEC-SCNC: 9.3 MG/DL (ref 8.6–10.5)
CHLORIDE SERPL-SCNC: 105 MMOL/L (ref 98–107)
CLARITY UR: ABNORMAL
CO2 SERPL-SCNC: 24.5 MMOL/L (ref 22–29)
COLOR UR: ABNORMAL
CREAT SERPL-MCNC: 0.73 MG/DL (ref 0.57–1)
D-LACTATE SERPL-SCNC: 0.5 MMOL/L (ref 0.5–2)
DEPRECATED RDW RBC AUTO: 43.2 FL (ref 37–54)
EOSINOPHIL # BLD AUTO: 0.13 10*3/MM3 (ref 0–0.4)
EOSINOPHIL NFR BLD AUTO: 2.7 % (ref 0.3–6.2)
ERYTHROCYTE [DISTWIDTH] IN BLOOD BY AUTOMATED COUNT: 13.2 % (ref 12.3–15.4)
GFR SERPL CREATININE-BSD FRML MDRD: 86 ML/MIN/1.73
GLOBULIN UR ELPH-MCNC: 2.5 GM/DL
GLUCOSE SERPL-MCNC: 96 MG/DL (ref 65–99)
GLUCOSE UR STRIP-MCNC: NEGATIVE MG/DL
HCT VFR BLD AUTO: 45.4 % (ref 34–46.6)
HGB BLD-MCNC: 15.1 G/DL (ref 12–15.9)
HGB UR QL STRIP.AUTO: ABNORMAL
HOLD SPECIMEN: NORMAL
HOLD SPECIMEN: NORMAL
HYALINE CASTS UR QL AUTO: ABNORMAL /LPF
IMM GRANULOCYTES # BLD AUTO: 0.01 10*3/MM3 (ref 0–0.05)
IMM GRANULOCYTES NFR BLD AUTO: 0.2 % (ref 0–0.5)
KETONES UR QL STRIP: NEGATIVE
LEUKOCYTE ESTERASE UR QL STRIP.AUTO: ABNORMAL
LIPASE SERPL-CCNC: 36 U/L (ref 13–60)
LYMPHOCYTES # BLD AUTO: 2.07 10*3/MM3 (ref 0.7–3.1)
LYMPHOCYTES NFR BLD AUTO: 42.9 % (ref 19.6–45.3)
MCH RBC QN AUTO: 30 PG (ref 26.6–33)
MCHC RBC AUTO-ENTMCNC: 33.3 G/DL (ref 31.5–35.7)
MCV RBC AUTO: 90.1 FL (ref 79–97)
MONOCYTES # BLD AUTO: 0.34 10*3/MM3 (ref 0.1–0.9)
MONOCYTES NFR BLD AUTO: 7 % (ref 5–12)
NEUTROPHILS NFR BLD AUTO: 2.27 10*3/MM3 (ref 1.7–7)
NEUTROPHILS NFR BLD AUTO: 47 % (ref 42.7–76)
NITRITE UR QL STRIP: NEGATIVE
NRBC BLD AUTO-RTO: 0 /100 WBC (ref 0–0.2)
PH UR STRIP.AUTO: 6 [PH] (ref 5–8)
PLATELET # BLD AUTO: 158 10*3/MM3 (ref 140–450)
PMV BLD AUTO: 10.3 FL (ref 6–12)
POTASSIUM SERPL-SCNC: 4.3 MMOL/L (ref 3.5–5.2)
PROT SERPL-MCNC: 7.1 G/DL (ref 6–8.5)
PROT UR QL STRIP: ABNORMAL
RBC # BLD AUTO: 5.04 10*6/MM3 (ref 3.77–5.28)
RBC # UR: ABNORMAL /HPF
REF LAB TEST METHOD: ABNORMAL
SODIUM SERPL-SCNC: 140 MMOL/L (ref 136–145)
SP GR UR STRIP: 1.01 (ref 1–1.03)
SQUAMOUS #/AREA URNS HPF: ABNORMAL /HPF
UROBILINOGEN UR QL STRIP: ABNORMAL
WBC # BLD AUTO: 4.83 10*3/MM3 (ref 3.4–10.8)
WBC UR QL AUTO: ABNORMAL /HPF
WHOLE BLOOD HOLD SPECIMEN: NORMAL
WHOLE BLOOD HOLD SPECIMEN: NORMAL

## 2021-10-01 PROCEDURE — 85025 COMPLETE CBC W/AUTO DIFF WBC: CPT | Performed by: NURSE PRACTITIONER

## 2021-10-01 PROCEDURE — 80053 COMPREHEN METABOLIC PANEL: CPT | Performed by: NURSE PRACTITIONER

## 2021-10-01 PROCEDURE — 83605 ASSAY OF LACTIC ACID: CPT | Performed by: NURSE PRACTITIONER

## 2021-10-01 PROCEDURE — 99283 EMERGENCY DEPT VISIT LOW MDM: CPT

## 2021-10-01 PROCEDURE — 96374 THER/PROPH/DIAG INJ IV PUSH: CPT

## 2021-10-01 PROCEDURE — 74176 CT ABD & PELVIS W/O CONTRAST: CPT

## 2021-10-01 PROCEDURE — 83690 ASSAY OF LIPASE: CPT | Performed by: NURSE PRACTITIONER

## 2021-10-01 PROCEDURE — 63710000001 PROMETHAZINE PER 25 MG: Performed by: FAMILY MEDICINE

## 2021-10-01 PROCEDURE — 25010000002 HYDROMORPHONE 1 MG/ML SOLUTION: Performed by: FAMILY MEDICINE

## 2021-10-01 PROCEDURE — 36415 COLL VENOUS BLD VENIPUNCTURE: CPT

## 2021-10-01 PROCEDURE — 81001 URINALYSIS AUTO W/SCOPE: CPT | Performed by: NURSE PRACTITIONER

## 2021-10-01 PROCEDURE — 96375 TX/PRO/DX INJ NEW DRUG ADDON: CPT

## 2021-10-01 RX ORDER — OXYCODONE HYDROCHLORIDE AND ACETAMINOPHEN 5; 325 MG/1; MG/1
1 TABLET ORAL EVERY 8 HOURS PRN
Qty: 4 TABLET | Refills: 0 | Status: SHIPPED | OUTPATIENT
Start: 2021-10-01 | End: 2021-10-19 | Stop reason: SDUPTHER

## 2021-10-01 RX ORDER — HYDROMORPHONE HYDROCHLORIDE 1 MG/ML
0.5 INJECTION, SOLUTION INTRAMUSCULAR; INTRAVENOUS; SUBCUTANEOUS ONCE
Status: COMPLETED | OUTPATIENT
Start: 2021-10-01 | End: 2021-10-01

## 2021-10-01 RX ORDER — PROMETHAZINE HYDROCHLORIDE 25 MG/1
25 TABLET ORAL ONCE
Status: COMPLETED | OUTPATIENT
Start: 2021-10-01 | End: 2021-10-01

## 2021-10-01 RX ORDER — PROMETHAZINE HYDROCHLORIDE 25 MG/1
25 TABLET ORAL EVERY 6 HOURS PRN
Qty: 6 TABLET | Refills: 0 | Status: SHIPPED | OUTPATIENT
Start: 2021-10-01 | End: 2021-12-19 | Stop reason: SDUPTHER

## 2021-10-01 RX ADMIN — HYDROMORPHONE HYDROCHLORIDE 0.5 MG: 1 INJECTION, SOLUTION INTRAMUSCULAR; INTRAVENOUS; SUBCUTANEOUS at 02:55

## 2021-10-01 RX ADMIN — PROMETHAZINE HYDROCHLORIDE 25 MG: 25 TABLET ORAL at 03:40

## 2021-10-01 NOTE — ED NOTES
MEDICAL SCREENING:    Reason for Visit: Left flank pain, hematuria    Patient initially seen in triage.  The patient was advised further evaluation and diagnostic testing will be needed, some of the treatment and testing will be initiated in the lobby in order to begin the process.  The patient will be returned to the waiting area for the time being and possibly be re-assessed by a subsequent ED provider.  The patient will be brought back to the treatment area in as timely manner as possible.       Tami Bianchi, APRN  10/01/21 0207

## 2021-10-01 NOTE — ED PROVIDER NOTES
Subjective   46-year-old female presents the emergency room planes of flank pain.  Patient reports that she has a history of kidney stones.  She states that she gets urethral stretching periodically every 4 weeks by Dr. Tran with urology.  She states she has an appointment tomorrow to see Dr. Verma.  She states tonight started having left flank pain.  She also reports that she had blood in her urine.  She states symptoms are similar when she had a kidney stone.  She does not take any medication for the symptoms.  Due to ongoing pain she came to the emergency room for evaluation states she had nausea and vomiting with symptoms as well.      Flank Pain  Pain location:  L flank  Pain quality: sharp    Pain radiates to:  L flank  Pain severity:  Moderate  Timing:  Constant  Chronicity:  New  Relieved by:  Nothing  Worsened by:  Nothing  Associated symptoms: nausea and vomiting    Associated symptoms: no anorexia, no belching, no chest pain, no cough, no fatigue and no fever        Review of Systems   Constitutional: Negative for fatigue and fever.   Respiratory: Negative for cough.    Cardiovascular: Negative for chest pain.   Gastrointestinal: Positive for nausea and vomiting. Negative for anorexia.   Genitourinary: Positive for flank pain.   All other systems reviewed and are negative.      Past Medical History:   Diagnosis Date   • Abdominal pain    • Abdominal swelling    • Hoyos's disease    • Bipolar 1 disorder (CMS/HCC)    • Brain tumor (benign) (CMS/HCC)    • Brain tumor (CMS/HCC)     R Frontal Lobe per pt   • Brain tumor (CMS/HCC) 2014   • Constipation    • DDD (degenerative disc disease), cervical 05/29/2017   • DDD (degenerative disc disease), cervical    • Diarrhea    • Fibromyalgia    • IBS (irritable bowel syndrome)    • Migraine    • Nausea & vomiting    • PONV (postoperative nausea and vomiting)    • PTSD (post-traumatic stress disorder)    • Rectal bleeding        Allergies   Allergen Reactions   •  Ativan [Lorazepam] Hallucinations     confusion   • Sulfa Antibiotics Shortness Of Breath and Swelling   • Sulfa Antibiotics Anaphylaxis   • Reglan [Metoclopramide] Angioedema   • Compazine [Prochlorperazine Edisylate] Hives   • Demerol [Meperidine] Hives   • Droperidol Itching   • Metoclopramide Swelling   • Toradol [Ketorolac Tromethamine] Hives and Itching   • Toradol [Ketorolac Tromethamine] Hives   • Zofran [Ondansetron Hcl] Rash   • Zosyn [Piperacillin Sod-Tazobactam So] Hives       Past Surgical History:   Procedure Laterality Date   • ANAL SCOPE N/A 7/28/2016    Procedure: ANAL SCOPE;  Surgeon: Kael Lopez MD;  Location: [facility] OR;  Service:    • APPENDECTOMY     • COLONOSCOPY N/A 6/30/2016    Procedure: COLONOSCOPY  CPTCODE:60388;  Surgeon: Jose Antonio Belle III, MD;  Location: [facility] OR;  Service:    • COLONOSCOPY N/A 7/7/2016    Procedure: COLONOSCOPY (46842) CPT;  Surgeon: Jose Antonio Belle III, MD;  Location: [facility] OR;  Service:    • CYSTOSCOPY RETROGRADE PYELOGRAM Bilateral 4/28/2017    Procedure: CYSTOSCOPY RETROGRADE PYELOGRAM;  Surgeon: Mu Blunt MD;  Location: [facility] OR;  Service:    • ENDOSCOPY N/A 6/30/2016    Procedure: ESOPHAGOGASTRODUODENOSCOPY WITH BIOPSY  CPTCODE:40903;  Surgeon: Jose Antonio Belle III, MD;  Location: [facility] OR;  Service:    • HEMORRHOIDECTOMY N/A 7/28/2016    Procedure: HEMORRHOID STAPLING;  Surgeon: Kael Lopez MD;  Location: [facility] OR;  Service:    • HYSTERECTOMY     • KNEE SURGERY     • LAPAROSCOPIC SALPINGOOPHERECTOMY     • PORTACATH PLACEMENT N/A 7/28/2017    Procedure: INSERTION OF PORTACATH;  Surgeon: Celso Arredondo MD;  Location: [facility] OR;  Service:    • SHOULDER SURGERY      3 times       Family History   Problem Relation Age of Onset   • Crohn's disease Other    • Hypertension Other    • Diabetes Other    • Irritable bowel syndrome Other    • No Known Problems Father    • No Known Problems Mother        Social History      Socioeconomic History   • Marital status:      Spouse name: Not on file   • Number of children: Not on file   • Years of education: Not on file   • Highest education level: Not on file   Tobacco Use   • Smoking status: Former Smoker     Packs/day: 1.00     Years: 20.00     Pack years: 20.00     Quit date: 2015     Years since quittin.9   • Smokeless tobacco: Never Used   Substance and Sexual Activity   • Alcohol use: No   • Drug use: Yes     Types: Marijuana     Comment: for pain   • Sexual activity: Defer     Birth control/protection: Surgical           Objective   Physical Exam  Vitals and nursing note reviewed.   Constitutional:       Comments: Appears older than stated age.   HENT:      Head: Normocephalic and atraumatic.      Nose: Nose normal.      Mouth/Throat:      Mouth: Mucous membranes are moist.   Cardiovascular:      Rate and Rhythm: Normal rate and regular rhythm.      Heart sounds: No murmur heard.        Comments: 2+ radial pulse bilaterally.  Pulmonary:      Effort: Pulmonary effort is normal.      Breath sounds: Normal breath sounds.      Comments: Right chest wall port  Abdominal:      General: Bowel sounds are normal.      Palpations: Abdomen is soft.      Tenderness: There is no abdominal tenderness. There is no guarding or rebound.   Musculoskeletal:      Right lower leg: No edema.      Left lower leg: No edema.   Skin:     General: Skin is warm and dry.   Neurological:      General: No focal deficit present.      Mental Status: She is alert and oriented to person, place, and time.      Cranial Nerves: No cranial nerve deficit.   Psychiatric:         Mood and Affect: Mood normal.         Procedures           ED Course  ED Course as of Oct 01 0332   Fri Oct 01, 2021   0251 Patient CBC is unremarkable lipase unremarkable lactic acid unremarkable.  Patient CMP shows slight elevation of ALT and AST at 37 and 39 respectively    [BB]   0251 Urinalysis shows large amount of blood.     [BB]   0251 Have ordered IV Dilaudid.    [BB]   0301 CT Abdomen Pelvis Stone Protocol    Result Date: 10/1/2021  Prior hysterectomy Postoperative changes in the rectum Otherwise normal Signer Name: Luis Hale MD  Signed: 10/1/2021 2:58 AM  Workstation Name: [email or web address]  [facility]        [BB]   0320 Have discussed CT findings with patient.  Discussed possibility of recently passed stone in light of her renal colic.  Discussed need follow-up Dr. Blunt with urology today as scheduled.  Have discussed warning signs symptoms that warrant return to the emergency room patient verbalized understanding.    [BB]      ED Course User Index  [BB] Earnest Renae MD                                           [facility]    Final diagnoses:   Renal colic   Hematuria, unspecified type       ED Disposition  ED Disposition     ED Disposition Condition Comment    Discharge Stable           Mu Blunt MD  [address] [facility]  [address]  [place] [address]  [phone]    Today           Medication List      New Prescriptions    oxyCODONE-acetaminophen 5-325 MG per tablet  Commonly known as: PERCOCET  Take 1 tablet by mouth Every 8 (Eight) Hours As Needed for Moderate Pain .  Replaces: oxyCODONE-acetaminophen  MG per tablet        Stop    ondansetron 4 MG tablet  Commonly known as: ZOFRAN     oxyCODONE-acetaminophen  MG per tablet  Commonly known as: Percocet  Replaced by: oxyCODONE-acetaminophen 5-325 MG per tablet           Where to Get Your Medications      These medications were sent to [place], [place] - [address] [place] [address] - [phone] [facility] [phone] [address] [address]    Phone: [phone]   · oxyCODONE-acetaminophen 5-325 MG per tablet     You can get these medications from any pharmacy    Bring a paper prescription for each of these medications  · promethazine 25 MG tablet          Earnest Renae MD  10/01/21 5650

## 2021-10-18 ENCOUNTER — APPOINTMENT (OUTPATIENT)
Dept: CT IMAGING | Facility: HOSPITAL | Age: 46
End: 2021-10-18

## 2021-10-18 ENCOUNTER — HOSPITAL ENCOUNTER (EMERGENCY)
Facility: HOSPITAL | Age: 46
Discharge: HOME | End: 2021-10-18
Attending: EMERGENCY MEDICINE | Admitting: EMERGENCY MEDICINE

## 2021-10-18 VITALS
BODY MASS INDEX: 19.62 KG/M2 | HEART RATE: 76 BPM | RESPIRATION RATE: 16 BRPM | TEMPERATURE: 98.7 F | SYSTOLIC BLOOD PRESSURE: 147 MMHG | WEIGHT: 125 LBS | HEIGHT: 67 IN | DIASTOLIC BLOOD PRESSURE: 82 MMHG | OXYGEN SATURATION: 100 %

## 2021-10-18 DIAGNOSIS — R31.9 HEMATURIA, UNSPECIFIED TYPE: Primary | ICD-10-CM

## 2021-10-18 LAB
ALBUMIN SERPL-MCNC: 4.62 G/DL (ref 3.5–5.2)
ALBUMIN/GLOB SERPL: 1.9 G/DL
ALP SERPL-CCNC: 70 U/L (ref 39–117)
ALT SERPL W P-5'-P-CCNC: 37 U/L (ref 1–33)
ANION GAP SERPL CALCULATED.3IONS-SCNC: 8.4 MMOL/L (ref 5–15)
AST SERPL-CCNC: 36 U/L (ref 1–32)
BACTERIA UR QL AUTO: ABNORMAL /HPF
BASOPHILS # BLD AUTO: 0.01 10*3/MM3 (ref 0–0.2)
BASOPHILS NFR BLD AUTO: 0.2 % (ref 0–1.5)
BILIRUB SERPL-MCNC: 0.7 MG/DL (ref 0–1.2)
BILIRUB UR QL STRIP: NEGATIVE
BUN SERPL-MCNC: 5 MG/DL (ref 6–20)
BUN/CREAT SERPL: 8.9 (ref 7–25)
CALCIUM SPEC-SCNC: 9.5 MG/DL (ref 8.6–10.5)
CHLORIDE SERPL-SCNC: 105 MMOL/L (ref 98–107)
CLARITY UR: CLEAR
CO2 SERPL-SCNC: 28.6 MMOL/L (ref 22–29)
COLOR UR: ABNORMAL
CREAT SERPL-MCNC: 0.56 MG/DL (ref 0.57–1)
DEPRECATED RDW RBC AUTO: 43.7 FL (ref 37–54)
EOSINOPHIL # BLD AUTO: 0.11 10*3/MM3 (ref 0–0.4)
EOSINOPHIL NFR BLD AUTO: 2.2 % (ref 0.3–6.2)
ERYTHROCYTE [DISTWIDTH] IN BLOOD BY AUTOMATED COUNT: 13.1 % (ref 12.3–15.4)
GFR SERPL CREATININE-BSD FRML MDRD: 117 ML/MIN/1.73
GLOBULIN UR ELPH-MCNC: 2.4 GM/DL
GLUCOSE SERPL-MCNC: 105 MG/DL (ref 65–99)
GLUCOSE UR STRIP-MCNC: NEGATIVE MG/DL
HCT VFR BLD AUTO: 44.5 % (ref 34–46.6)
HGB BLD-MCNC: 15.1 G/DL (ref 12–15.9)
HGB UR QL STRIP.AUTO: ABNORMAL
HYALINE CASTS UR QL AUTO: ABNORMAL /LPF
IMM GRANULOCYTES # BLD AUTO: 0.02 10*3/MM3 (ref 0–0.05)
IMM GRANULOCYTES NFR BLD AUTO: 0.4 % (ref 0–0.5)
KETONES UR QL STRIP: NEGATIVE
LEUKOCYTE ESTERASE UR QL STRIP.AUTO: ABNORMAL
LYMPHOCYTES # BLD AUTO: 2.08 10*3/MM3 (ref 0.7–3.1)
LYMPHOCYTES NFR BLD AUTO: 41.4 % (ref 19.6–45.3)
MCH RBC QN AUTO: 30.7 PG (ref 26.6–33)
MCHC RBC AUTO-ENTMCNC: 33.9 G/DL (ref 31.5–35.7)
MCV RBC AUTO: 90.4 FL (ref 79–97)
MONOCYTES # BLD AUTO: 0.33 10*3/MM3 (ref 0.1–0.9)
MONOCYTES NFR BLD AUTO: 6.6 % (ref 5–12)
NEUTROPHILS NFR BLD AUTO: 2.48 10*3/MM3 (ref 1.7–7)
NEUTROPHILS NFR BLD AUTO: 49.2 % (ref 42.7–76)
NITRITE UR QL STRIP: NEGATIVE
NRBC BLD AUTO-RTO: 0 /100 WBC (ref 0–0.2)
PH UR STRIP.AUTO: 6.5 [PH] (ref 5–8)
PLATELET # BLD AUTO: 151 10*3/MM3 (ref 140–450)
PMV BLD AUTO: 10.7 FL (ref 6–12)
POTASSIUM SERPL-SCNC: 3.8 MMOL/L (ref 3.5–5.2)
PROT SERPL-MCNC: 7 G/DL (ref 6–8.5)
PROT UR QL STRIP: ABNORMAL
RBC # BLD AUTO: 4.92 10*6/MM3 (ref 3.77–5.28)
RBC # UR: ABNORMAL /HPF
REF LAB TEST METHOD: ABNORMAL
SODIUM SERPL-SCNC: 142 MMOL/L (ref 136–145)
SP GR UR STRIP: 1.01 (ref 1–1.03)
SQUAMOUS #/AREA URNS HPF: ABNORMAL /HPF
UROBILINOGEN UR QL STRIP: ABNORMAL
WBC # BLD AUTO: 5.03 10*3/MM3 (ref 3.4–10.8)
WBC UR QL AUTO: ABNORMAL /HPF

## 2021-10-18 PROCEDURE — 74176 CT ABD & PELVIS W/O CONTRAST: CPT | Performed by: RADIOLOGY

## 2021-10-18 PROCEDURE — 96375 TX/PRO/DX INJ NEW DRUG ADDON: CPT

## 2021-10-18 PROCEDURE — 25010000002 DIPHENHYDRAMINE PER 50 MG: Performed by: EMERGENCY MEDICINE

## 2021-10-18 PROCEDURE — 25010000002 HEPARIN LOCK FLUSH PER 10 UNITS: Performed by: EMERGENCY MEDICINE

## 2021-10-18 PROCEDURE — 99283 EMERGENCY DEPT VISIT LOW MDM: CPT

## 2021-10-18 PROCEDURE — 85025 COMPLETE CBC W/AUTO DIFF WBC: CPT | Performed by: EMERGENCY MEDICINE

## 2021-10-18 PROCEDURE — 74176 CT ABD & PELVIS W/O CONTRAST: CPT

## 2021-10-18 PROCEDURE — 81001 URINALYSIS AUTO W/SCOPE: CPT | Performed by: EMERGENCY MEDICINE

## 2021-10-18 PROCEDURE — 96374 THER/PROPH/DIAG INJ IV PUSH: CPT

## 2021-10-18 PROCEDURE — 80053 COMPREHEN METABOLIC PANEL: CPT | Performed by: EMERGENCY MEDICINE

## 2021-10-18 PROCEDURE — 25010000002 MORPHINE PER 10 MG: Performed by: EMERGENCY MEDICINE

## 2021-10-18 RX ORDER — SODIUM CHLORIDE 0.9 % (FLUSH) 0.9 %
10 SYRINGE (ML) INJECTION AS NEEDED
Status: DISCONTINUED | OUTPATIENT
Start: 2021-10-18 | End: 2021-10-18 | Stop reason: HOSPADM

## 2021-10-18 RX ORDER — HEPARIN SODIUM (PORCINE) LOCK FLUSH IV SOLN 100 UNIT/ML 100 UNIT/ML
300 SOLUTION INTRAVENOUS ONCE
Status: COMPLETED | OUTPATIENT
Start: 2021-10-18 | End: 2021-10-18

## 2021-10-18 RX ORDER — POLYETHYLENE GLYCOL 3350 17 G/17G
17 POWDER, FOR SOLUTION ORAL DAILY
Status: DISCONTINUED | OUTPATIENT
Start: 2021-10-18 | End: 2021-10-18 | Stop reason: HOSPADM

## 2021-10-18 RX ORDER — DIPHENHYDRAMINE HYDROCHLORIDE 50 MG/ML
25 INJECTION INTRAMUSCULAR; INTRAVENOUS ONCE
Status: COMPLETED | OUTPATIENT
Start: 2021-10-18 | End: 2021-10-18

## 2021-10-18 RX ADMIN — Medication 300 UNITS: at 10:42

## 2021-10-18 RX ADMIN — POLYETHYLENE GLYCOL (3350) 17 G: 17 POWDER, FOR SOLUTION ORAL at 10:42

## 2021-10-18 RX ADMIN — MORPHINE SULFATE 4 MG: 4 INJECTION INTRAVENOUS at 10:24

## 2021-10-18 RX ADMIN — DIPHENHYDRAMINE HYDROCHLORIDE 25 MG: 50 INJECTION, SOLUTION INTRAMUSCULAR; INTRAVENOUS at 10:25

## 2021-10-18 NOTE — ED PROVIDER NOTES
Subjective   Patient presents to ER with hematuria, and abdominal pain      Abdominal Pain  Pain location:  L flank  Pain radiates to:  Does not radiate  Pain severity:  Moderate  Onset quality:  Gradual  Timing:  Constant  Progression:  Worsening  Chronicity:  Recurrent  Relieved by:  Nothing  Worsened by:  Nothing  Ineffective treatments:  None tried  Associated symptoms: hematuria and nausea        Review of Systems   Constitutional: Positive for activity change.   HENT: Negative.    Respiratory: Negative.    Cardiovascular: Negative.    Gastrointestinal: Positive for abdominal pain and nausea.   Endocrine: Negative.    Genitourinary: Positive for flank pain and hematuria.   Skin: Negative.    Allergic/Immunologic: Negative.    Neurological: Negative.    Hematological: Negative.    Psychiatric/Behavioral: Negative.        Past Medical History:   Diagnosis Date   • Abdominal pain    • Abdominal swelling    • Hoyos's disease    • Bipolar 1 disorder (CMS/HCC)    • Brain tumor (benign) (CMS/HCC)    • Brain tumor (CMS/HCC)     R Frontal Lobe per pt   • Brain tumor (CMS/HCC) 2014   • Constipation    • DDD (degenerative disc disease), cervical 05/29/2017   • DDD (degenerative disc disease), cervical    • Diarrhea    • Fibromyalgia    • IBS (irritable bowel syndrome)    • Migraine    • Nausea & vomiting    • PONV (postoperative nausea and vomiting)    • PTSD (post-traumatic stress disorder)    • Rectal bleeding        Allergies   Allergen Reactions   • Ativan [Lorazepam] Hallucinations     confusion   • Sulfa Antibiotics Shortness Of Breath and Swelling   • Sulfa Antibiotics Anaphylaxis   • Reglan [Metoclopramide] Angioedema   • Compazine [Prochlorperazine Edisylate] Hives   • Demerol [Meperidine] Hives   • Droperidol Itching   • Metoclopramide Swelling   • Toradol [Ketorolac Tromethamine] Hives and Itching   • Toradol [Ketorolac Tromethamine] Hives   • Zofran [Ondansetron Hcl] Rash   • Zosyn [Piperacillin  Sod-Tazobactam So] Hives       Past Surgical History:   Procedure Laterality Date   • ANAL SCOPE N/A 2016    Procedure: ANAL SCOPE;  Surgeon: Kael Lopez MD;  Location: [facility] OR;  Service:    • APPENDECTOMY     • COLONOSCOPY N/A 2016    Procedure: COLONOSCOPY  CPTCODE:96435;  Surgeon: Jose Antonio Belle III, MD;  Location: [facility] OR;  Service:    • COLONOSCOPY N/A 2016    Procedure: COLONOSCOPY (36173) CPT;  Surgeon: Jose Antonio Belle III, MD;  Location: [facility] OR;  Service:    • CYSTOSCOPY RETROGRADE PYELOGRAM Bilateral 2017    Procedure: CYSTOSCOPY RETROGRADE PYELOGRAM;  Surgeon: Mu Blunt MD;  Location: [facility] OR;  Service:    • ENDOSCOPY N/A 2016    Procedure: ESOPHAGOGASTRODUODENOSCOPY WITH BIOPSY  CPTCODE:06849;  Surgeon: Jose Antonio Belle III, MD;  Location: [facility] OR;  Service:    • HEMORRHOIDECTOMY N/A 2016    Procedure: HEMORRHOID STAPLING;  Surgeon: Kael Lopez MD;  Location: [facility] OR;  Service:    • HYSTERECTOMY     • KNEE SURGERY     • LAPAROSCOPIC SALPINGOOPHERECTOMY     • PORTACATH PLACEMENT N/A 2017    Procedure: INSERTION OF PORTACATH;  Surgeon: Celso Arredondo MD;  Location: [facility] OR;  Service:    • SHOULDER SURGERY      3 times       Family History   Problem Relation Age of Onset   • Crohn's disease Other    • Hypertension Other    • Diabetes Other    • Irritable bowel syndrome Other    • No Known Problems Father    • No Known Problems Mother        Social History     Socioeconomic History   • Marital status:    Tobacco Use   • Smoking status: Former Smoker     Packs/day: 1.00     Years: 20.00     Pack years: 20.00     Quit date: 2015     Years since quittin.9   • Smokeless tobacco: Never Used   Substance and Sexual Activity   • Alcohol use: No   • Drug use: Yes     Types: Marijuana     Comment: for pain   • Sexual activity: Defer     Birth control/protection: Surgical           Objective   Physical Exam  Vitals  and nursing note reviewed.   Constitutional:       Appearance: Normal appearance.   HENT:      Head: Normocephalic.      Nose: Nose normal.      Mouth/Throat:      Mouth: Mucous membranes are moist.   Eyes:      Pupils: Pupils are equal, round, and reactive to light.   Cardiovascular:      Pulses: Normal pulses.   Pulmonary:      Effort: Pulmonary effort is normal.   Musculoskeletal:         General: Normal range of motion.      Cervical back: Normal range of motion.   Skin:     Capillary Refill: Capillary refill takes less than 2 seconds.   Neurological:      General: No focal deficit present.      Mental Status: She is alert.   Psychiatric:         Mood and Affect: Mood normal.         Procedures           ED Course                                           MDM    Final diagnoses:   Hematuria, unspecified type       ED Disposition  ED Disposition     ED Disposition Condition Comment    Discharge Stable           Mabel Patterson, APRN  [address]  [place] [address]  [phone]          Mu Blunt MD  [address]  [place] [address]  [phone]               Medication List      No changes were made to your prescriptions during this visit.          Mitesh Garza MD  10/18/21 1280

## 2021-10-19 ENCOUNTER — OFFICE VISIT (OUTPATIENT)
Dept: UROLOGY | Facility: CLINIC | Age: 46
End: 2021-10-19

## 2021-10-19 ENCOUNTER — TELEPHONE (OUTPATIENT)
Dept: UROLOGY | Facility: CLINIC | Age: 46
End: 2021-10-19

## 2021-10-19 VITALS — BODY MASS INDEX: 19.62 KG/M2 | HEIGHT: 67 IN | WEIGHT: 125 LBS

## 2021-10-19 DIAGNOSIS — N35.028 OTHER POST-TRAUMATIC URETHRAL STRICTURE, FEMALE: ICD-10-CM

## 2021-10-19 DIAGNOSIS — N23 RENAL COLIC: ICD-10-CM

## 2021-10-19 DIAGNOSIS — Z48.816 AFTERCARE FOLLOWING SURGERY OF THE GENITOURINARY SYSTEM: Primary | ICD-10-CM

## 2021-10-19 PROCEDURE — 99213 OFFICE O/P EST LOW 20 MIN: CPT | Performed by: UROLOGY

## 2021-10-19 PROCEDURE — 53661 DILATION OF URETHRA: CPT | Performed by: UROLOGY

## 2021-10-19 PROCEDURE — 96372 THER/PROPH/DIAG INJ SC/IM: CPT | Performed by: UROLOGY

## 2021-10-19 RX ORDER — OXYCODONE AND ACETAMINOPHEN 10; 325 MG/1; MG/1
1 TABLET ORAL EVERY 6 HOURS PRN
Qty: 10 TABLET | Refills: 0 | Status: SHIPPED | OUTPATIENT
Start: 2021-10-19 | End: 2021-11-09 | Stop reason: SDUPTHER

## 2021-10-19 RX ORDER — OXYCODONE HYDROCHLORIDE AND ACETAMINOPHEN 5; 325 MG/1; MG/1
1 TABLET ORAL EVERY 8 HOURS PRN
Qty: 4 TABLET | Refills: 0 | Status: SHIPPED | OUTPATIENT
Start: 2021-10-19 | End: 2022-08-04 | Stop reason: SDUPTHER

## 2021-10-19 RX ORDER — GENTAMICIN SULFATE 40 MG/ML
80 INJECTION, SOLUTION INTRAMUSCULAR; INTRAVENOUS ONCE
Status: COMPLETED | OUTPATIENT
Start: 2021-10-19 | End: 2021-10-19

## 2021-10-19 RX ORDER — OXYCODONE HYDROCHLORIDE AND ACETAMINOPHEN 5; 325 MG/1; MG/1
1 TABLET ORAL EVERY 8 HOURS PRN
Qty: 4 TABLET | Refills: 0 | Status: SHIPPED | OUTPATIENT
Start: 2021-10-19 | End: 2021-10-19 | Stop reason: SDUPTHER

## 2021-10-19 RX ADMIN — GENTAMICIN SULFATE 80 MG: 40 INJECTION, SOLUTION INTRAMUSCULAR; INTRAVENOUS at 08:46

## 2021-10-19 NOTE — PROGRESS NOTES
Chief Complaint:          Chief Complaint   Patient presents with   • urethra stricture     Dilation        HPI:   46 y.o. female very well-known to me with a postprocedural urethral stricture who was in the ER last night for gross hematuria CT was negative.  She now presents for urgent dilation.      Past Medical History:        Past Medical History:   Diagnosis Date   • Abdominal pain    • Abdominal swelling    • Hoyos's disease    • Bipolar 1 disorder (HCC)    • Brain tumor (benign) (HCC)    • Brain tumor (HCC)     R Frontal Lobe per pt   • Brain tumor (HCC) 2014   • Constipation    • DDD (degenerative disc disease), cervical 05/29/2017   • DDD (degenerative disc disease), cervical    • Diarrhea    • Fibromyalgia    • IBS (irritable bowel syndrome)    • Migraine    • Nausea & vomiting    • PONV (postoperative nausea and vomiting)    • PTSD (post-traumatic stress disorder)    • Rectal bleeding          Current Meds:     Current Outpatient Medications   Medication Sig Dispense Refill   • albuterol (PROVENTIL HFA;VENTOLIN HFA) 108 (90 Base) MCG/ACT inhaler Inhale 2 puffs 2 (Two) Times a Day.     • Azelastine HCl 137 MCG/SPRAY solution 1 spray into each nostril As Needed (Allergies).     • busPIRone (BUSPAR) 15 MG tablet Take 15 mg by mouth 3 (three) times a day        • cyclobenzaprine (FLEXERIL) 5 MG tablet      • diclofenac (VOLTAREN) 1 % gel gel      • divalproex (DEPAKOTE) 500 MG DR tablet Take 1 tablet by mouth 3 (Three) Times a Day. 90 tablet 2   • docusate sodium (COLACE) 100 MG capsule Take 1 capsule by mouth 2 (Two) Times a Day. 20 capsule 0   • fluticasone (VERAMYST) 27.5 MCG/SPRAY nasal spray 2 sprays into each nostril Daily.     • montelukast (SINGULAIR) 10 MG tablet Take 10 mg by mouth Every Night.     • oxyCODONE-acetaminophen (PERCOCET) 5-325 MG per tablet Take 1 tablet by mouth Every 8 (Eight) Hours As Needed for Moderate Pain . 4 tablet 0   • pantoprazole (PROTONIX) 40 MG EC tablet Take 40 mg by  mouth 2 (Two) Times a Day As Needed.     • phenazopyridine (PYRIDIUM) 100 MG tablet      • polyethylene glycol (MIRALAX) 17 GM/SCOOP powder Take 17 g by mouth Daily. 225 g 0   • promethazine (PHENERGAN) 25 MG tablet Take 1 tablet by mouth Every 6 (Six) Hours As Needed for Nausea or Vomiting. 6 tablet 0   • sertraline (ZOLOFT) 100 MG tablet Take 100 mg by mouth 2 (Two) Times a Day.     • SUMAtriptan (IMITREX) 6 MG/0.5ML solution injection Inject 6 mg under the skin 1 (One) Time.     • tiZANidine (ZANAFLEX) 4 MG tablet      • traMADol (ULTRAM) 50 MG tablet        No current facility-administered medications for this visit.        Allergies:      Allergies   Allergen Reactions   • Ativan [Lorazepam] Hallucinations     confusion   • Sulfa Antibiotics Shortness Of Breath and Swelling   • Sulfa Antibiotics Anaphylaxis   • Reglan [Metoclopramide] Angioedema   • Compazine [Prochlorperazine Edisylate] Hives   • Demerol [Meperidine] Hives   • Droperidol Itching   • Metoclopramide Swelling   • Toradol [Ketorolac Tromethamine] Hives and Itching   • Toradol [Ketorolac Tromethamine] Hives   • Zofran [Ondansetron Hcl] Rash   • Zosyn [Piperacillin Sod-Tazobactam So] Hives        Past Surgical History:     Past Surgical History:   Procedure Laterality Date   • ANAL SCOPE N/A 7/28/2016    Procedure: ANAL SCOPE;  Surgeon: Kael Lopez MD;  Location: [facility] OR;  Service:    • APPENDECTOMY     • COLONOSCOPY N/A 6/30/2016    Procedure: COLONOSCOPY  CPTCODE:43770;  Surgeon: Jose Antonio Belle III, MD;  Location: [facility] OR;  Service:    • COLONOSCOPY N/A 7/7/2016    Procedure: COLONOSCOPY (17809) CPT;  Surgeon: Jose Antonio Belle III, MD;  Location: [facility] OR;  Service:    • CYSTOSCOPY RETROGRADE PYELOGRAM Bilateral 4/28/2017    Procedure: CYSTOSCOPY RETROGRADE PYELOGRAM;  Surgeon: Mu Blunt MD;  Location: [facility] OR;  Service:    • ENDOSCOPY N/A 6/30/2016    Procedure: ESOPHAGOGASTRODUODENOSCOPY WITH BIOPSY   CPTCODE:95007;  Surgeon: Jose Antonio Belle III, MD;  Location: [facility];  Service:    • HEMORRHOIDECTOMY N/A 2016    Procedure: HEMORRHOID STAPLING;  Surgeon: Kael Lopez MD;  Location: [facility] OR;  Service:    • HYSTERECTOMY     • KNEE SURGERY     • LAPAROSCOPIC SALPINGOOPHERECTOMY     • PORTACATH PLACEMENT N/A 2017    Procedure: INSERTION OF PORTACATH;  Surgeon: Celso Arredondo MD;  Location: [facility];  Service:    • SHOULDER SURGERY      3 times         Social History:     Social History     Socioeconomic History   • Marital status:    Tobacco Use   • Smoking status: Former Smoker     Packs/day: 1.00     Years: 20.00     Pack years: 20.00     Quit date: 2015     Years since quittin.9   • Smokeless tobacco: Never Used   Substance and Sexual Activity   • Alcohol use: No   • Drug use: Yes     Types: Marijuana     Comment: for pain   • Sexual activity: Defer     Birth control/protection: Surgical       Family History:     Family History   Problem Relation Age of Onset   • Crohn's disease Other    • Hypertension Other    • Diabetes Other    • Irritable bowel syndrome Other    • No Known Problems Father    • No Known Problems Mother        Review of Systems:     Review of Systems   Constitutional: Negative.  Negative for activity change, appetite change, chills, diaphoresis, fatigue and unexpected weight change.   HENT: Negative for congestion, dental problem, drooling, ear discharge, ear pain, facial swelling, hearing loss, mouth sores, nosebleeds, postnasal drip, rhinorrhea, sinus pressure, sneezing, sore throat, tinnitus, trouble swallowing and voice change.    Eyes: Negative.  Negative for photophobia, pain, discharge, redness, itching and visual disturbance.   Respiratory: Negative.  Negative for apnea, cough, choking, chest tightness, shortness of breath, wheezing and stridor.    Cardiovascular: Negative.  Negative for chest pain, palpitations and leg swelling.    Gastrointestinal: Negative.  Negative for abdominal distention, abdominal pain, anal bleeding, blood in stool, constipation, diarrhea, nausea, rectal pain and vomiting.   Endocrine: Negative.  Negative for cold intolerance, heat intolerance, polydipsia, polyphagia and polyuria.   Genitourinary: Positive for difficulty urinating and hematuria.   Musculoskeletal: Negative.  Negative for arthralgias, back pain, gait problem, joint swelling, myalgias, neck pain and neck stiffness.   Skin: Negative.  Negative for color change, pallor, rash and wound.   Allergic/Immunologic: Negative.  Negative for environmental allergies, food allergies and immunocompromised state.   Neurological: Negative.  Negative for dizziness, tremors, seizures, syncope, facial asymmetry, speech difficulty, weakness, light-headedness, numbness and headaches.   Hematological: Negative.  Negative for adenopathy. Does not bruise/bleed easily.   Psychiatric/Behavioral: Negative for agitation, behavioral problems, confusion, decreased concentration, dysphoric mood, hallucinations, self-injury, sleep disturbance and suicidal ideas. The patient is not nervous/anxious and is not hyperactive.    All other systems reviewed and are negative.      Physical Exam:     Physical Exam  Constitutional:       Appearance: She is well-developed.   HENT:      Head: Normocephalic and atraumatic.      Right Ear: External ear normal.      Left Ear: External ear normal.   Eyes:      Conjunctiva/sclera: Conjunctivae normal.      Pupils: Pupils are equal, round, and reactive to light.   Cardiovascular:      Rate and Rhythm: Normal rate and regular rhythm.      Heart sounds: Normal heart sounds.   Pulmonary:      Effort: Pulmonary effort is normal.      Breath sounds: Normal breath sounds.   Abdominal:      General: Bowel sounds are normal. There is no distension.      Palpations: Abdomen is soft. There is no mass.      Tenderness: There is no abdominal tenderness. There is  no guarding or rebound.   Genitourinary:     General: Normal vulva.      Vagina: No vaginal discharge.   Musculoskeletal:         General: Normal range of motion.   Skin:     General: Skin is warm and dry.   Neurological:      Mental Status: She is alert.      Deep Tendon Reflexes: Reflexes are normal and symmetric.   Psychiatric:         Behavior: Behavior normal.         Thought Content: Thought content normal.         Judgment: Judgment normal.         I have reviewed the following portions of the patient's history: Allergies, current medications, past family history, past medical history, past social history, past surgical history, problem list, and ROS and confirm it is accurate.      Procedure:   Urethral dilation-after an appropriate informed consent, the patient was brought to the procedure suite.  The urethra was gently anesthetized with 10 mL of 2% viscous Xylocaine jelly.  After an adequate period of topical anesthesia I went ahead and dilated with Jarratt sounds from 16 to 24 [place]ian sequentially without complication. The patient was given gentamicin as prophylaxis with 80 mg.    Assessment/Plan:   Urethral stricture-posttraumatic status post successful dilatation  Narcotic pain medication-patient has significant acute pain that I believe would be an indication for the use of narcotic pain medication.  I discussed the significant risks of pain medication and the fact that this will be a short only option and I will give her no more than a three-day supply of pain medication, I will not plan long-term medication, and that this will be sent to a pain clinic if at all becomes necessary.  We discussed signing a pain medication agreement and the fact that we're going to run a state [facility] review to be sure the patient is not getting pain medication from elsewhere.  If this is the case, we will not give pain medication as part of the patient's treatment plan of there being prescribed a controlled substance  with potential for abuse.  This patient has been well aware of the appropriate dose of such medications including the risks for somnolence, limited ability to drive and/or safety and the significant potential for overdose.  It has been made clear that these medications are for the prescribed patient only without concomitant use of alcohol or other substance unless prescribed by the medical provider.  Has completed prescribing agreement detailing the terms of continue prescribing him a controlled substance including monitoring Luis Alberto reports, the possibility of urine drug screens, and pill counts.  The patient is aware that we review LUIS ALBERTO reports on a regular basis and scan them into the chart.  History and physical examination exhibited continued safe and appropriate use of controlled substances. We also discussed the fact that the new [place] legislation allows only a three-day prescription for pain medication.  In this situation he will be referred to a chronic pain clinic.                  This document has been electronically signed by VERENICE FINK MD October 19, 2021 08:24 EDT

## 2021-11-09 ENCOUNTER — OFFICE VISIT (OUTPATIENT)
Dept: UROLOGY | Facility: CLINIC | Age: 46
End: 2021-11-09

## 2021-11-09 VITALS — HEIGHT: 67 IN | BODY MASS INDEX: 19.62 KG/M2 | WEIGHT: 125 LBS

## 2021-11-09 DIAGNOSIS — N35.028 OTHER POST-TRAUMATIC URETHRAL STRICTURE, FEMALE: Primary | ICD-10-CM

## 2021-11-09 DIAGNOSIS — N23 RENAL COLIC: ICD-10-CM

## 2021-11-09 PROCEDURE — 53661 DILATION OF URETHRA: CPT | Performed by: UROLOGY

## 2021-11-09 PROCEDURE — 99213 OFFICE O/P EST LOW 20 MIN: CPT | Performed by: UROLOGY

## 2021-11-09 RX ORDER — OXYCODONE AND ACETAMINOPHEN 10; 325 MG/1; MG/1
1 TABLET ORAL EVERY 6 HOURS PRN
Qty: 10 TABLET | Refills: 0 | Status: SHIPPED | OUTPATIENT
Start: 2021-11-09 | End: 2021-11-30 | Stop reason: SDUPTHER

## 2021-11-09 NOTE — PROGRESS NOTES
Chief Complaint:          Chief Complaint   Patient presents with   • Dilation     Dilation        HPI:   46 y.o. 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  She is here for repeat urethral dilation.      Past Medical History:        Past Medical History:   Diagnosis Date   • Abdominal pain    • Abdominal swelling    • Hoyos's disease    • Bipolar 1 disorder (HCC)    • Brain tumor (benign) (HCC)    • Brain tumor (HCC)     R Frontal Lobe per pt   • Brain tumor (HCC) 2014   • Constipation    • DDD (degenerative disc disease), cervical 05/29/2017   • DDD (degenerative disc disease), cervical    • Diarrhea    • Fibromyalgia    • IBS (irritable bowel syndrome)    • Migraine    • Nausea & vomiting    • PONV (postoperative nausea and vomiting)    • PTSD (post-traumatic stress disorder)    • Rectal bleeding          Current Meds:     Current Outpatient Medications   Medication Sig Dispense Refill   • albuterol (PROVENTIL HFA;VENTOLIN HFA) 108 (90 Base) MCG/ACT inhaler Inhale 2 puffs 2 (Two) Times a Day.     • Azelastine HCl 137 MCG/SPRAY solution 1 spray into each nostril As Needed (Allergies).     • busPIRone (BUSPAR) 15 MG tablet Take 15 mg by mouth 3 (three) times a day        • cyclobenzaprine (FLEXERIL) 5 MG tablet      • diclofenac (VOLTAREN) 1 % gel gel      • divalproex (DEPAKOTE) 500 MG DR tablet Take 1 tablet by mouth 3 (Three) Times a Day. 90 tablet 2   • docusate sodium (COLACE) 100 MG capsule Take 1 capsule by mouth 2 (Two) Times a Day. 20 capsule 0   • fluticasone (VERAMYST) 27.5 MCG/SPRAY nasal spray 2 sprays into each nostril Daily.     • montelukast (SINGULAIR) 10 MG tablet Take 10 mg by mouth Every Night.     • oxyCODONE-acetaminophen (Percocet)  MG per tablet Take 1 tablet by mouth Every 6 (Six) Hours As Needed for Moderate Pain . 10 tablet 0   • oxyCODONE-acetaminophen (PERCOCET) 5-325 MG per tablet Take 1 tablet by mouth  Every 8 (Eight) Hours As Needed for Moderate Pain . 4 tablet 0   • pantoprazole (PROTONIX) 40 MG EC tablet Take 40 mg by mouth 2 (Two) Times a Day As Needed.     • phenazopyridine (PYRIDIUM) 100 MG tablet      • polyethylene glycol (MIRALAX) 17 GM/SCOOP powder Take 17 g by mouth Daily. 225 g 0   • promethazine (PHENERGAN) 25 MG tablet Take 1 tablet by mouth Every 6 (Six) Hours As Needed for Nausea or Vomiting. 6 tablet 0   • sertraline (ZOLOFT) 100 MG tablet Take 100 mg by mouth 2 (Two) Times a Day.     • SUMAtriptan (IMITREX) 6 MG/0.5ML solution injection Inject 6 mg under the skin 1 (One) Time.     • tiZANidine (ZANAFLEX) 4 MG tablet      • traMADol (ULTRAM) 50 MG tablet        No current facility-administered medications for this visit.        Allergies:      Allergies   Allergen Reactions   • Ativan [Lorazepam] Hallucinations     confusion   • Sulfa Antibiotics Shortness Of Breath and Swelling   • Sulfa Antibiotics Anaphylaxis   • Reglan [Metoclopramide] Angioedema   • Compazine [Prochlorperazine Edisylate] Hives   • Demerol [Meperidine] Hives   • Droperidol Itching   • Metoclopramide Swelling   • Toradol [Ketorolac Tromethamine] Hives and Itching   • Toradol [Ketorolac Tromethamine] Hives   • Zofran [Ondansetron Hcl] Rash   • Zosyn [Piperacillin Sod-Tazobactam So] Hives        Past Surgical History:     Past Surgical History:   Procedure Laterality Date   • ANAL SCOPE N/A 7/28/2016    Procedure: ANAL SCOPE;  Surgeon: Kael Lopez MD;  Location: [facility] OR;  Service:    • APPENDECTOMY     • COLONOSCOPY N/A 6/30/2016    Procedure: COLONOSCOPY  CPTCODE:24480;  Surgeon: Jose Antonio Belle III, MD;  Location: [facility] OR;  Service:    • COLONOSCOPY N/A 7/7/2016    Procedure: COLONOSCOPY (91151) CPT;  Surgeon: Jose Antonio Belle III, MD;  Location: [facility] OR;  Service:    • CYSTOSCOPY RETROGRADE PYELOGRAM Bilateral 4/28/2017    Procedure: CYSTOSCOPY RETROGRADE PYELOGRAM;  Surgeon: Mu Blunt MD;   Location: [facility] OR;  Service:    • ENDOSCOPY N/A 2016    Procedure: ESOPHAGOGASTRODUODENOSCOPY WITH BIOPSY  CPTCODE:66379;  Surgeon: Jose Antonio Belle III, MD;  Location: [facility] OR;  Service:    • HEMORRHOIDECTOMY N/A 2016    Procedure: HEMORRHOID STAPLING;  Surgeon: Kael Lopez MD;  Location: [facility] OR;  Service:    • HYSTERECTOMY     • KNEE SURGERY     • LAPAROSCOPIC SALPINGOOPHERECTOMY     • PORTACATH PLACEMENT N/A 2017    Procedure: INSERTION OF PORTACATH;  Surgeon: Celso Arredondo MD;  Location: [facility] OR;  Service:    • SHOULDER SURGERY      3 times         Social History:     Social History     Socioeconomic History   • Marital status:    Tobacco Use   • Smoking status: Former Smoker     Packs/day: 1.00     Years: 20.00     Pack years: 20.00     Quit date: 2015     Years since quittin.0   • Smokeless tobacco: Never Used   Substance and Sexual Activity   • Alcohol use: No   • Drug use: Yes     Types: Marijuana     Comment: for pain   • Sexual activity: Defer     Birth control/protection: Surgical       Family History:     Family History   Problem Relation Age of Onset   • Crohn's disease Other    • Hypertension Other    • Diabetes Other    • Irritable bowel syndrome Other    • No Known Problems Father    • No Known Problems Mother        Review of Systems:     Review of Systems   Constitutional: Negative.  Negative for activity change, appetite change, chills, diaphoresis, fatigue and unexpected weight change.   HENT: Negative for congestion, dental problem, drooling, ear discharge, ear pain, facial swelling, hearing loss, mouth sores, nosebleeds, postnasal drip, rhinorrhea, sinus pressure, sneezing, sore throat, tinnitus, trouble swallowing and voice change.    Eyes: Negative.  Negative for photophobia, pain, discharge, redness, itching and visual disturbance.   Respiratory: Negative.  Negative for apnea, cough, choking, chest tightness, shortness of breath, wheezing  and stridor.    Cardiovascular: Negative.  Negative for chest pain, palpitations and leg swelling.   Gastrointestinal: Negative.  Negative for abdominal distention, abdominal pain, anal bleeding, blood in stool, constipation, diarrhea, nausea, rectal pain and vomiting.   Endocrine: Negative.  Negative for cold intolerance, heat intolerance, polydipsia, polyphagia and polyuria.   Genitourinary: Positive for difficulty urinating.   Musculoskeletal: Negative.  Negative for arthralgias, back pain, gait problem, joint swelling, myalgias, neck pain and neck stiffness.   Skin: Negative.  Negative for color change, pallor, rash and wound.   Allergic/Immunologic: Negative.  Negative for environmental allergies, food allergies and immunocompromised state.   Neurological: Negative.  Negative for dizziness, tremors, seizures, syncope, facial asymmetry, speech difficulty, weakness, light-headedness, numbness and headaches.   Hematological: Negative.  Negative for adenopathy. Does not bruise/bleed easily.   Psychiatric/Behavioral: Negative for agitation, behavioral problems, confusion, decreased concentration, dysphoric mood, hallucinations, self-injury, sleep disturbance and suicidal ideas. The patient is not nervous/anxious and is not hyperactive.    All other systems reviewed and are negative.      Physical Exam:     Physical Exam  Constitutional:       Appearance: She is well-developed.   HENT:      Head: Normocephalic and atraumatic.      Right Ear: External ear normal.      Left Ear: External ear normal.   Eyes:      Conjunctiva/sclera: Conjunctivae normal.      Pupils: Pupils are equal, round, and reactive to light.   Cardiovascular:      Rate and Rhythm: Normal rate and regular rhythm.      Heart sounds: Normal heart sounds.   Pulmonary:      Effort: Pulmonary effort is normal.      Breath sounds: Normal breath sounds.   Abdominal:      General: Bowel sounds are normal. There is no distension.      Palpations: Abdomen is  soft. There is no mass.      Tenderness: There is no abdominal tenderness. There is no guarding or rebound.   Genitourinary:     General: Normal vulva.      Vagina: No vaginal discharge.   Musculoskeletal:         General: Normal range of motion.   Skin:     General: Skin is warm and dry.   Neurological:      Mental Status: She is alert.      Deep Tendon Reflexes: Reflexes are normal and symmetric.   Psychiatric:         Behavior: Behavior normal.         Thought Content: Thought content normal.         Judgment: Judgment normal.         I have reviewed the following portions of the patient's history: Allergies, current medications, past family history, past medical history, past social history, past surgical history, problem list, and ROS and confirm it is accurate.      Procedure:     Urethral dilation-after an appropriate informed consent, the patient was brought to the procedure suite.  The urethra was gently anesthetized with 10 mL of 2% viscous Xylocaine jelly.  After an adequate period of topical anesthesia I went ahead and and dilated with Reese sounds from 16 to 24 Persian sequentially without complication. The patient was given gentamicin as prophylaxis with 80 mg.  Assessment/Plan:   Urethral stricture-post traumatic status post dilation  Narcotic pain medication-patient has significant acute pain that I believe would be an indication for the use of narcotic pain medication.  I discussed the significant risks of pain medication and the fact that this will be a short only option and I will give her no more than a three-day supply of pain medication, I will not plan long-term medication, and that this will be sent to a pain clinic if at all becomes necessary.  We discussed signing a pain medication agreement and the fact that we're going to run a state [place]ERT[place] review to be sure the patient is not getting pain medication from elsewhere.  If this is the case, we will not give pain medication as part of the  patient's treatment plan of there being prescribed a controlled substance with potential for abuse.  This patient has been well aware of the appropriate dose of such medications including the risks for somnolence, limited ability to drive and/or safety and the significant potential for overdose.  It has been made clear that these medications are for the prescribed patient only without concomitant use of alcohol or other substance unless prescribed by the medical provider.  Has completed prescribing agreement detailing the terms of continue prescribing him a controlled substance including monitoring Luis Alberto reports, the possibility of urine drug screens, and pill counts.  The patient is aware that we review LUIS ALBERTO reports on a regular basis and scan them into the chart.  History and physical examination exhibited continued safe and appropriate use of controlled substances. We also discussed the fact that the new [place] legislation allows only a three-day prescription for pain medication.  In this situation he will be referred to a chronic pain clinic.  Given a short additional course of pain medication secondary to recent instrumentation                  This document has been electronically signed by VERENICE FINK MD November 9, 2021 13:56 EST

## 2021-11-14 ENCOUNTER — APPOINTMENT (OUTPATIENT)
Dept: CT IMAGING | Facility: HOSPITAL | Age: 46
End: 2021-11-14

## 2021-11-14 ENCOUNTER — HOSPITAL ENCOUNTER (EMERGENCY)
Facility: HOSPITAL | Age: 46
Discharge: LEFT BEFORE DISCHARGE | End: 2021-11-14
Attending: STUDENT IN AN ORGANIZED HEALTH CARE EDUCATION/TRAINING PROGRAM | Admitting: FAMILY MEDICINE

## 2021-11-14 ENCOUNTER — APPOINTMENT (OUTPATIENT)
Dept: GENERAL RADIOLOGY | Facility: HOSPITAL | Age: 46
End: 2021-11-14

## 2021-11-14 VITALS
DIASTOLIC BLOOD PRESSURE: 75 MMHG | BODY MASS INDEX: 19.62 KG/M2 | HEART RATE: 83 BPM | RESPIRATION RATE: 20 BRPM | HEIGHT: 67 IN | OXYGEN SATURATION: 99 % | TEMPERATURE: 98.2 F | SYSTOLIC BLOOD PRESSURE: 124 MMHG | WEIGHT: 125 LBS

## 2021-11-14 DIAGNOSIS — R91.1 PULMONARY NODULE: ICD-10-CM

## 2021-11-14 DIAGNOSIS — R55 SYNCOPE, UNSPECIFIED SYNCOPE TYPE: Primary | ICD-10-CM

## 2021-11-14 DIAGNOSIS — J40 BRONCHITIS: ICD-10-CM

## 2021-11-14 LAB
ALBUMIN SERPL-MCNC: 4.47 G/DL (ref 3.5–5.2)
ALBUMIN/GLOB SERPL: 1.9 G/DL
ALP SERPL-CCNC: 68 U/L (ref 39–117)
ALT SERPL W P-5'-P-CCNC: 39 U/L (ref 1–33)
ANION GAP SERPL CALCULATED.3IONS-SCNC: 10.2 MMOL/L (ref 5–15)
AST SERPL-CCNC: 35 U/L (ref 1–32)
BASOPHILS # BLD AUTO: 0.01 10*3/MM3 (ref 0–0.2)
BASOPHILS NFR BLD AUTO: 0.2 % (ref 0–1.5)
BILIRUB SERPL-MCNC: 0.7 MG/DL (ref 0–1.2)
BUN SERPL-MCNC: 7 MG/DL (ref 6–20)
BUN/CREAT SERPL: 11.7 (ref 7–25)
CALCIUM SPEC-SCNC: 9 MG/DL (ref 8.6–10.5)
CHLORIDE SERPL-SCNC: 107 MMOL/L (ref 98–107)
CO2 SERPL-SCNC: 22.8 MMOL/L (ref 22–29)
CREAT SERPL-MCNC: 0.6 MG/DL (ref 0.57–1)
DEPRECATED RDW RBC AUTO: 44.2 FL (ref 37–54)
EOSINOPHIL # BLD AUTO: 0.06 10*3/MM3 (ref 0–0.4)
EOSINOPHIL NFR BLD AUTO: 1.3 % (ref 0.3–6.2)
ERYTHROCYTE [DISTWIDTH] IN BLOOD BY AUTOMATED COUNT: 13.1 % (ref 12.3–15.4)
GFR SERPL CREATININE-BSD FRML MDRD: 108 ML/MIN/1.73
GLOBULIN UR ELPH-MCNC: 2.3 GM/DL
GLUCOSE SERPL-MCNC: 85 MG/DL (ref 65–99)
HCT VFR BLD AUTO: 45.2 % (ref 34–46.6)
HGB BLD-MCNC: 14.9 G/DL (ref 12–15.9)
HOLD SPECIMEN: NORMAL
HOLD SPECIMEN: NORMAL
IMM GRANULOCYTES # BLD AUTO: 0.01 10*3/MM3 (ref 0–0.05)
IMM GRANULOCYTES NFR BLD AUTO: 0.2 % (ref 0–0.5)
INR PPP: 0.92 (ref 0.9–1.1)
LYMPHOCYTES # BLD AUTO: 1.89 10*3/MM3 (ref 0.7–3.1)
LYMPHOCYTES NFR BLD AUTO: 40.6 % (ref 19.6–45.3)
MCH RBC QN AUTO: 30 PG (ref 26.6–33)
MCHC RBC AUTO-ENTMCNC: 33 G/DL (ref 31.5–35.7)
MCV RBC AUTO: 91.1 FL (ref 79–97)
MONOCYTES # BLD AUTO: 0.28 10*3/MM3 (ref 0.1–0.9)
MONOCYTES NFR BLD AUTO: 6 % (ref 5–12)
NEUTROPHILS NFR BLD AUTO: 2.41 10*3/MM3 (ref 1.7–7)
NEUTROPHILS NFR BLD AUTO: 51.7 % (ref 42.7–76)
NRBC BLD AUTO-RTO: 0 /100 WBC (ref 0–0.2)
NT-PROBNP SERPL-MCNC: 130.5 PG/ML (ref 0–450)
PLATELET # BLD AUTO: 131 10*3/MM3 (ref 140–450)
PMV BLD AUTO: 10.3 FL (ref 6–12)
POTASSIUM SERPL-SCNC: 3.9 MMOL/L (ref 3.5–5.2)
PROT SERPL-MCNC: 6.8 G/DL (ref 6–8.5)
PROTHROMBIN TIME: 12.8 SECONDS (ref 12.8–14.5)
RBC # BLD AUTO: 4.96 10*6/MM3 (ref 3.77–5.28)
SODIUM SERPL-SCNC: 140 MMOL/L (ref 136–145)
TROPONIN T SERPL-MCNC: <0.01 NG/ML (ref 0–0.03)
TROPONIN T SERPL-MCNC: <0.01 NG/ML (ref 0–0.03)
WBC # BLD AUTO: 4.66 10*3/MM3 (ref 3.4–10.8)
WHOLE BLOOD HOLD SPECIMEN: NORMAL
WHOLE BLOOD HOLD SPECIMEN: NORMAL

## 2021-11-14 PROCEDURE — 0 IOPAMIDOL PER 1 ML: Performed by: FAMILY MEDICINE

## 2021-11-14 PROCEDURE — 71045 X-RAY EXAM CHEST 1 VIEW: CPT

## 2021-11-14 PROCEDURE — 71275 CT ANGIOGRAPHY CHEST: CPT

## 2021-11-14 PROCEDURE — 36415 COLL VENOUS BLD VENIPUNCTURE: CPT

## 2021-11-14 PROCEDURE — 83880 ASSAY OF NATRIURETIC PEPTIDE: CPT | Performed by: STUDENT IN AN ORGANIZED HEALTH CARE EDUCATION/TRAINING PROGRAM

## 2021-11-14 PROCEDURE — 96374 THER/PROPH/DIAG INJ IV PUSH: CPT

## 2021-11-14 PROCEDURE — 99283 EMERGENCY DEPT VISIT LOW MDM: CPT

## 2021-11-14 PROCEDURE — 93005 ELECTROCARDIOGRAM TRACING: CPT | Performed by: STUDENT IN AN ORGANIZED HEALTH CARE EDUCATION/TRAINING PROGRAM

## 2021-11-14 PROCEDURE — 70450 CT HEAD/BRAIN W/O DYE: CPT

## 2021-11-14 PROCEDURE — 85025 COMPLETE CBC W/AUTO DIFF WBC: CPT | Performed by: STUDENT IN AN ORGANIZED HEALTH CARE EDUCATION/TRAINING PROGRAM

## 2021-11-14 PROCEDURE — 85610 PROTHROMBIN TIME: CPT | Performed by: STUDENT IN AN ORGANIZED HEALTH CARE EDUCATION/TRAINING PROGRAM

## 2021-11-14 PROCEDURE — 63710000001 PROMETHAZINE PER 25 MG: Performed by: FAMILY MEDICINE

## 2021-11-14 PROCEDURE — 93010 ELECTROCARDIOGRAM REPORT: CPT | Performed by: INTERNAL MEDICINE

## 2021-11-14 PROCEDURE — 25010000002 MORPHINE PER 10 MG: Performed by: FAMILY MEDICINE

## 2021-11-14 PROCEDURE — 84484 ASSAY OF TROPONIN QUANT: CPT | Performed by: STUDENT IN AN ORGANIZED HEALTH CARE EDUCATION/TRAINING PROGRAM

## 2021-11-14 PROCEDURE — 99284 EMERGENCY DEPT VISIT MOD MDM: CPT

## 2021-11-14 PROCEDURE — 80053 COMPREHEN METABOLIC PANEL: CPT | Performed by: STUDENT IN AN ORGANIZED HEALTH CARE EDUCATION/TRAINING PROGRAM

## 2021-11-14 RX ORDER — MORPHINE SULFATE 2 MG/ML
2 INJECTION, SOLUTION INTRAMUSCULAR; INTRAVENOUS ONCE
Status: COMPLETED | OUTPATIENT
Start: 2021-11-14 | End: 2021-11-14

## 2021-11-14 RX ORDER — PROMETHAZINE HYDROCHLORIDE 25 MG/1
25 TABLET ORAL ONCE
Status: COMPLETED | OUTPATIENT
Start: 2021-11-14 | End: 2021-11-14

## 2021-11-14 RX ORDER — SODIUM CHLORIDE 0.9 % (FLUSH) 0.9 %
10 SYRINGE (ML) INJECTION AS NEEDED
Status: DISCONTINUED | OUTPATIENT
Start: 2021-11-14 | End: 2021-11-14 | Stop reason: HOSPADM

## 2021-11-14 RX ORDER — DOXYCYCLINE 100 MG/1
100 CAPSULE ORAL 2 TIMES DAILY
Qty: 14 CAPSULE | Refills: 0 | Status: SHIPPED | OUTPATIENT
Start: 2021-11-14 | End: 2022-01-14

## 2021-11-14 RX ADMIN — MORPHINE SULFATE 2 MG: 2 INJECTION, SOLUTION INTRAMUSCULAR; INTRAVENOUS at 08:37

## 2021-11-14 RX ADMIN — PROMETHAZINE HYDROCHLORIDE 25 MG: 25 TABLET ORAL at 07:35

## 2021-11-14 RX ADMIN — IOPAMIDOL 60 ML: 755 INJECTION, SOLUTION INTRAVENOUS at 07:54

## 2021-11-14 NOTE — ED NOTES
"Pt refused nitropaste, pt states \"I dont believe its my heart\". Pt requests provider prescribe her something else for pain. Provider made aware, will await new orders.      Pamela Wynne RN  11/14/21 0738    "

## 2021-11-14 NOTE — ED NOTES
Pt returned from ct, pt tolerated imaging without difficulty.      Pamela Wynne, RN  11/14/21 0861

## 2021-11-14 NOTE — ED NOTES
Pt reports she does not wish to be transferred. Pt alert and oriented, skin pwd, no resp distress. Ns noted. Pt signed ama form. Provider is aware of pt leaving ama. Pt left er ambulatory with spouse.      Pamela Wynne RN  11/14/21 2055

## 2021-11-14 NOTE — ED NOTES
Pt reports headache and chest pain is still present but improved. Pt resting on stretcher with lights dimmed. Family at bedside, call light within reach of pt.      Pamela Wynne RN  11/14/21 8244     Pulmonology

## 2021-11-14 NOTE — ED PROVIDER NOTES
Subjective   46-year-old female with past medical history of bipolar disorder, Buerger's disease, and fibromyalgia presented to the ER with primary complaint of left-sided chest pain.  Patient radiation to the left back.  Noted intermittent shortness of breath.  Patient also noted possible syncopal episode at home where the patient passed out while lying down.  Denied focal neurological deficits or vision changes.  Denied significant headache.  Denied nausea vomiting.  Denied diaphoresis.  Denied obvious aggravating or alleviating factors.  Vital stable          Review of Systems   Respiratory: Positive for shortness of breath.    Cardiovascular: Positive for chest pain.   Neurological: Positive for syncope.   All other systems reviewed and are negative.      Past Medical History:   Diagnosis Date   • Abdominal pain    • Abdominal swelling    • Hoyos's disease    • Bipolar 1 disorder (HCC)    • Brain tumor (benign) (HCC)    • Brain tumor (HCC)     R Frontal Lobe per pt   • Brain tumor (HCC) 2014   • Constipation    • DDD (degenerative disc disease), cervical 05/29/2017   • DDD (degenerative disc disease), cervical    • Diarrhea    • Fibromyalgia    • IBS (irritable bowel syndrome)    • Migraine    • Nausea & vomiting    • PONV (postoperative nausea and vomiting)    • PTSD (post-traumatic stress disorder)    • Rectal bleeding        Allergies   Allergen Reactions   • Ativan [Lorazepam] Hallucinations     confusion   • Sulfa Antibiotics Shortness Of Breath and Swelling   • Sulfa Antibiotics Anaphylaxis   • Reglan [Metoclopramide] Angioedema   • Compazine [Prochlorperazine Edisylate] Hives   • Demerol [Meperidine] Hives   • Droperidol Itching   • Metoclopramide Swelling   • Toradol [Ketorolac Tromethamine] Hives and Itching   • Toradol [Ketorolac Tromethamine] Hives   • Zofran [Ondansetron Hcl] Rash   • Zosyn [Piperacillin Sod-Tazobactam So] Hives       Past Surgical History:   Procedure Laterality Date   • ANAL SCOPE  N/A 2016    Procedure: ANAL SCOPE;  Surgeon: Kael Lopez MD;  Location: [facility] OR;  Service:    • APPENDECTOMY     • COLONOSCOPY N/A 2016    Procedure: COLONOSCOPY  CPTCODE:16620;  Surgeon: Jose Antonio Belle III, MD;  Location: [facility] OR;  Service:    • COLONOSCOPY N/A 2016    Procedure: COLONOSCOPY (27630) CPT;  Surgeon: Jose Antonio Belle III, MD;  Location: [facility] OR;  Service:    • CYSTOSCOPY RETROGRADE PYELOGRAM Bilateral 2017    Procedure: CYSTOSCOPY RETROGRADE PYELOGRAM;  Surgeon: Mu Blunt MD;  Location: [facility] OR;  Service:    • ENDOSCOPY N/A 2016    Procedure: ESOPHAGOGASTRODUODENOSCOPY WITH BIOPSY  CPTCODE:46366;  Surgeon: Jose Antonio Belle III, MD;  Location: [facility] OR;  Service:    • HEMORRHOIDECTOMY N/A 2016    Procedure: HEMORRHOID STAPLING;  Surgeon: Kael Lopez MD;  Location: [facility] OR;  Service:    • HYSTERECTOMY     • KNEE SURGERY     • LAPAROSCOPIC SALPINGOOPHERECTOMY     • PORTACATH PLACEMENT N/A 2017    Procedure: INSERTION OF PORTACATH;  Surgeon: Celso Arredondo MD;  Location: [facility] OR;  Service:    • SHOULDER SURGERY      3 times       Family History   Problem Relation Age of Onset   • Crohn's disease Other    • Hypertension Other    • Diabetes Other    • Irritable bowel syndrome Other    • No Known Problems Father    • No Known Problems Mother        Social History     Socioeconomic History   • Marital status:    Tobacco Use   • Smoking status: Former Smoker     Packs/day: 1.00     Years: 20.00     Pack years: 20.00     Quit date: 2015     Years since quittin.0   • Smokeless tobacco: Never Used   Substance and Sexual Activity   • Alcohol use: No   • Drug use: Yes     Types: Marijuana     Comment: for pain   • Sexual activity: Defer     Birth control/protection: Surgical           Objective   Physical Exam  Constitutional:       General: She is not in acute distress.     Appearance: Normal appearance. She is not  ill-appearing.   HENT:      Head: Normocephalic and atraumatic.      Right Ear: External ear normal.      Left Ear: External ear normal.      Nose: Nose normal.      Mouth/Throat:      Mouth: Mucous membranes are moist.   Eyes:      Extraocular Movements: Extraocular movements intact.      Pupils: Pupils are equal, round, and reactive to light.   Cardiovascular:      Rate and Rhythm: Normal rate and regular rhythm.      Heart sounds: No murmur heard.      Pulmonary:      Effort: Pulmonary effort is normal. No respiratory distress.      Breath sounds: Normal breath sounds. No wheezing.   Abdominal:      General: Bowel sounds are normal.      Palpations: Abdomen is soft.      Tenderness: There is no abdominal tenderness.   Musculoskeletal:         General: No deformity or signs of injury. Normal range of motion.      Cervical back: Normal range of motion and neck supple.   Skin:     General: Skin is warm and dry.      Findings: No erythema.   Neurological:      General: No focal deficit present.      Mental Status: She is alert and oriented to person, place, and time. Mental status is at baseline.      Cranial Nerves: No cranial nerve deficit.   Psychiatric:         Mood and Affect: Mood normal.         Behavior: Behavior normal.         Thought Content: Thought content normal.         Procedures           ED Course  ED Course as of 11/14/21 1812   Sun Nov 14, 2021   0630 EKG sinus rhythm.  60 bpm.  QRS 70 ms.  QTc 432 ms.  No acute ST abnormality [SF]   0632 Care of this patient was transferred to the next attending physician at shift change.  Complete discussion of presentation, labs, imaging, care, and expected course occurred during transition of providers.  Vitals stable at transfer of care. [SF]   0643 Electronically signed by Jean Paul Hussein DO, 11/14/21, 6:43 AM EST.   [SF]   0654 Patient CBC unremarkable [BB]   0656 Patient's coags unremarkable [BB]   0708 BNP CMP unremarkable troponin unremarkable [BB]    0709 Chest x-ray is hyperinflated lungs.  No pneumothorax no acute infiltrate appreciated [BB]   0740 Patient refuses nitroglycerin paste.  Have given Phenergan for nausea. [BB]   0842 CT scan of head is unremarkable. [BB]   0859 Patient with 2 sets of cardiac enzymes returned negative. [BB]   0915 Have discussed need for admission to the hospital patient declines to be admitted.  She desires to go home.  Have discussed risk of death endorgan damage loss of lifestyle patient verbalized standing she has no signs intoxication document was that she still desires to go home.  Have discussed again that patient with her syncope as well as her chest discomfort could be related to a cardiac etiology she still declines to be admitted to the hospital. [BB]      ED Course User Index  [BB] Earnest Renae MD  [SF] Jean Paul Hussein DO                                           [facility]    Final diagnoses:   Syncope, unspecified syncope type   Bronchitis   Pulmonary nodule       ED Disposition  ED Disposition     ED Disposition Condition Comment    Mabel Savage, APRN  [address] [id number] [phone]    In 2 days           Medication List      New Prescriptions    doxycycline 100 MG capsule  Commonly known as: MONODOX  Take 1 capsule by mouth 2 (Two) Times a Day.           Where to Get Your Medications      You can get these medications from any pharmacy    Bring a paper prescription for each of these medications  · doxycycline 100 MG capsule          Earnest Renae MD  11/14/21 7408

## 2021-11-16 LAB
QT INTERVAL: 426 MS
QTC INTERVAL: 432 MS

## 2021-11-30 ENCOUNTER — OFFICE VISIT (OUTPATIENT)
Dept: UROLOGY | Facility: CLINIC | Age: 46
End: 2021-11-30

## 2021-11-30 VITALS — HEIGHT: 67 IN | WEIGHT: 124 LBS | BODY MASS INDEX: 19.46 KG/M2

## 2021-11-30 DIAGNOSIS — N35.028 OTHER POST-TRAUMATIC URETHRAL STRICTURE, FEMALE: Primary | ICD-10-CM

## 2021-11-30 DIAGNOSIS — N23 RENAL COLIC: ICD-10-CM

## 2021-11-30 PROCEDURE — 99213 OFFICE O/P EST LOW 20 MIN: CPT | Performed by: UROLOGY

## 2021-11-30 PROCEDURE — 53661 DILATION OF URETHRA: CPT | Performed by: UROLOGY

## 2021-11-30 RX ORDER — OXYCODONE AND ACETAMINOPHEN 10; 325 MG/1; MG/1
1 TABLET ORAL EVERY 6 HOURS PRN
Qty: 10 TABLET | Refills: 0 | Status: SHIPPED | OUTPATIENT
Start: 2021-11-30 | End: 2021-12-28 | Stop reason: SDUPTHER

## 2021-12-19 ENCOUNTER — HOSPITAL ENCOUNTER (EMERGENCY)
Facility: HOSPITAL | Age: 46
Discharge: HOME | End: 2021-12-19
Attending: STUDENT IN AN ORGANIZED HEALTH CARE EDUCATION/TRAINING PROGRAM | Admitting: STUDENT IN AN ORGANIZED HEALTH CARE EDUCATION/TRAINING PROGRAM

## 2021-12-19 VITALS
DIASTOLIC BLOOD PRESSURE: 74 MMHG | OXYGEN SATURATION: 99 % | WEIGHT: 120 LBS | HEIGHT: 68 IN | TEMPERATURE: 98 F | HEART RATE: 80 BPM | SYSTOLIC BLOOD PRESSURE: 118 MMHG | RESPIRATION RATE: 16 BRPM | BODY MASS INDEX: 18.19 KG/M2

## 2021-12-19 DIAGNOSIS — G43.909 MIGRAINE WITHOUT STATUS MIGRAINOSUS, NOT INTRACTABLE, UNSPECIFIED MIGRAINE TYPE: Primary | ICD-10-CM

## 2021-12-19 DIAGNOSIS — R11.2 NON-INTRACTABLE VOMITING WITH NAUSEA, UNSPECIFIED VOMITING TYPE: ICD-10-CM

## 2021-12-19 LAB
ALBUMIN SERPL-MCNC: 3.96 G/DL (ref 3.5–5.2)
ALBUMIN/GLOB SERPL: 1.8 G/DL
ALP SERPL-CCNC: 70 U/L (ref 39–117)
ALT SERPL W P-5'-P-CCNC: 27 U/L (ref 1–33)
ANION GAP SERPL CALCULATED.3IONS-SCNC: 10 MMOL/L (ref 5–15)
AST SERPL-CCNC: 29 U/L (ref 1–32)
BACTERIA UR QL AUTO: ABNORMAL /HPF
BASOPHILS # BLD AUTO: 0.01 10*3/MM3 (ref 0–0.2)
BASOPHILS NFR BLD AUTO: 0.2 % (ref 0–1.5)
BILIRUB SERPL-MCNC: 0.4 MG/DL (ref 0–1.2)
BILIRUB UR QL STRIP: NEGATIVE
BUN SERPL-MCNC: 5 MG/DL (ref 6–20)
BUN/CREAT SERPL: 9.3 (ref 7–25)
CALCIUM SPEC-SCNC: 8.8 MG/DL (ref 8.6–10.5)
CHLORIDE SERPL-SCNC: 106 MMOL/L (ref 98–107)
CLARITY UR: CLEAR
CO2 SERPL-SCNC: 25 MMOL/L (ref 22–29)
COLOR UR: ABNORMAL
CREAT SERPL-MCNC: 0.54 MG/DL (ref 0.57–1)
CRP SERPL-MCNC: <0.3 MG/DL (ref 0–0.5)
DEPRECATED RDW RBC AUTO: 44 FL (ref 37–54)
EOSINOPHIL # BLD AUTO: 0.08 10*3/MM3 (ref 0–0.4)
EOSINOPHIL NFR BLD AUTO: 1.8 % (ref 0.3–6.2)
ERYTHROCYTE [DISTWIDTH] IN BLOOD BY AUTOMATED COUNT: 13.2 % (ref 12.3–15.4)
ERYTHROCYTE [SEDIMENTATION RATE] IN BLOOD: 3 MM/HR (ref 0–20)
GFR SERPL CREATININE-BSD FRML MDRD: 122 ML/MIN/1.73
GLOBULIN UR ELPH-MCNC: 2.2 GM/DL
GLUCOSE SERPL-MCNC: 87 MG/DL (ref 65–99)
GLUCOSE UR STRIP-MCNC: NEGATIVE MG/DL
HCT VFR BLD AUTO: 42 % (ref 34–46.6)
HGB BLD-MCNC: 13.8 G/DL (ref 12–15.9)
HGB UR QL STRIP.AUTO: ABNORMAL
HOLD SPECIMEN: NORMAL
HOLD SPECIMEN: NORMAL
HYALINE CASTS UR QL AUTO: ABNORMAL /LPF
IMM GRANULOCYTES # BLD AUTO: 0.01 10*3/MM3 (ref 0–0.05)
IMM GRANULOCYTES NFR BLD AUTO: 0.2 % (ref 0–0.5)
KETONES UR QL STRIP: NEGATIVE
LEUKOCYTE ESTERASE UR QL STRIP.AUTO: ABNORMAL
LIPASE SERPL-CCNC: 36 U/L (ref 13–60)
LYMPHOCYTES # BLD AUTO: 1.63 10*3/MM3 (ref 0.7–3.1)
LYMPHOCYTES NFR BLD AUTO: 37.2 % (ref 19.6–45.3)
MCH RBC QN AUTO: 29.9 PG (ref 26.6–33)
MCHC RBC AUTO-ENTMCNC: 32.9 G/DL (ref 31.5–35.7)
MCV RBC AUTO: 90.9 FL (ref 79–97)
MONOCYTES # BLD AUTO: 0.26 10*3/MM3 (ref 0.1–0.9)
MONOCYTES NFR BLD AUTO: 5.9 % (ref 5–12)
NEUTROPHILS NFR BLD AUTO: 2.39 10*3/MM3 (ref 1.7–7)
NEUTROPHILS NFR BLD AUTO: 54.7 % (ref 42.7–76)
NITRITE UR QL STRIP: NEGATIVE
NRBC BLD AUTO-RTO: 0 /100 WBC (ref 0–0.2)
PH UR STRIP.AUTO: 7 [PH] (ref 5–8)
PLATELET # BLD AUTO: 131 10*3/MM3 (ref 140–450)
PMV BLD AUTO: 10.2 FL (ref 6–12)
POTASSIUM SERPL-SCNC: 4 MMOL/L (ref 3.5–5.2)
PROT SERPL-MCNC: 6.2 G/DL (ref 6–8.5)
PROT UR QL STRIP: ABNORMAL
RBC # BLD AUTO: 4.62 10*6/MM3 (ref 3.77–5.28)
RBC # UR STRIP: ABNORMAL /HPF
REF LAB TEST METHOD: ABNORMAL
SODIUM SERPL-SCNC: 141 MMOL/L (ref 136–145)
SP GR UR STRIP: <=1.005 (ref 1–1.03)
SQUAMOUS #/AREA URNS HPF: ABNORMAL /HPF
UROBILINOGEN UR QL STRIP: ABNORMAL
WBC # UR STRIP: ABNORMAL /HPF
WBC NRBC COR # BLD: 4.38 10*3/MM3 (ref 3.4–10.8)
WHOLE BLOOD HOLD SPECIMEN: NORMAL
WHOLE BLOOD HOLD SPECIMEN: NORMAL

## 2021-12-19 PROCEDURE — 85652 RBC SED RATE AUTOMATED: CPT | Performed by: STUDENT IN AN ORGANIZED HEALTH CARE EDUCATION/TRAINING PROGRAM

## 2021-12-19 PROCEDURE — 85025 COMPLETE CBC W/AUTO DIFF WBC: CPT | Performed by: STUDENT IN AN ORGANIZED HEALTH CARE EDUCATION/TRAINING PROGRAM

## 2021-12-19 PROCEDURE — 96374 THER/PROPH/DIAG INJ IV PUSH: CPT

## 2021-12-19 PROCEDURE — 96375 TX/PRO/DX INJ NEW DRUG ADDON: CPT

## 2021-12-19 PROCEDURE — 25010000002 DEXAMETHASONE: Performed by: STUDENT IN AN ORGANIZED HEALTH CARE EDUCATION/TRAINING PROGRAM

## 2021-12-19 PROCEDURE — 86140 C-REACTIVE PROTEIN: CPT | Performed by: STUDENT IN AN ORGANIZED HEALTH CARE EDUCATION/TRAINING PROGRAM

## 2021-12-19 PROCEDURE — 25010000002 DIPHENHYDRAMINE PER 50 MG: Performed by: STUDENT IN AN ORGANIZED HEALTH CARE EDUCATION/TRAINING PROGRAM

## 2021-12-19 PROCEDURE — 25010000002 BUTORPHANOL PER 1 MG: Performed by: STUDENT IN AN ORGANIZED HEALTH CARE EDUCATION/TRAINING PROGRAM

## 2021-12-19 PROCEDURE — 80053 COMPREHEN METABOLIC PANEL: CPT | Performed by: STUDENT IN AN ORGANIZED HEALTH CARE EDUCATION/TRAINING PROGRAM

## 2021-12-19 PROCEDURE — 25010000002 PROMETHAZINE PER 50 MG: Performed by: STUDENT IN AN ORGANIZED HEALTH CARE EDUCATION/TRAINING PROGRAM

## 2021-12-19 PROCEDURE — 99283 EMERGENCY DEPT VISIT LOW MDM: CPT

## 2021-12-19 PROCEDURE — 36415 COLL VENOUS BLD VENIPUNCTURE: CPT

## 2021-12-19 PROCEDURE — 25010000002 HEPARIN LOCK FLUSH PER 10 UNITS

## 2021-12-19 PROCEDURE — 96376 TX/PRO/DX INJ SAME DRUG ADON: CPT

## 2021-12-19 PROCEDURE — 81001 URINALYSIS AUTO W/SCOPE: CPT | Performed by: STUDENT IN AN ORGANIZED HEALTH CARE EDUCATION/TRAINING PROGRAM

## 2021-12-19 PROCEDURE — 83690 ASSAY OF LIPASE: CPT | Performed by: STUDENT IN AN ORGANIZED HEALTH CARE EDUCATION/TRAINING PROGRAM

## 2021-12-19 RX ORDER — HEPARIN SODIUM (PORCINE) LOCK FLUSH IV SOLN 100 UNIT/ML 100 UNIT/ML
300 SOLUTION INTRAVENOUS ONCE
Status: COMPLETED | OUTPATIENT
Start: 2021-12-19 | End: 2021-12-19

## 2021-12-19 RX ORDER — BUTORPHANOL TARTRATE 1 MG/ML
1 INJECTION, SOLUTION INTRAMUSCULAR; INTRAVENOUS ONCE
Status: COMPLETED | OUTPATIENT
Start: 2021-12-19 | End: 2021-12-19

## 2021-12-19 RX ORDER — DIPHENHYDRAMINE HYDROCHLORIDE 50 MG/ML
12.5 INJECTION INTRAMUSCULAR; INTRAVENOUS ONCE
Status: COMPLETED | OUTPATIENT
Start: 2021-12-19 | End: 2021-12-19

## 2021-12-19 RX ORDER — SODIUM CHLORIDE 0.9 % (FLUSH) 0.9 %
10 SYRINGE (ML) INJECTION AS NEEDED
Status: DISCONTINUED | OUTPATIENT
Start: 2021-12-19 | End: 2021-12-19 | Stop reason: HOSPADM

## 2021-12-19 RX ORDER — PROMETHAZINE HYDROCHLORIDE 25 MG/1
25 TABLET ORAL EVERY 6 HOURS PRN
Qty: 15 TABLET | Refills: 0 | Status: SHIPPED | OUTPATIENT
Start: 2021-12-19 | End: 2022-05-25 | Stop reason: SDUPTHER

## 2021-12-19 RX ORDER — HEPARIN SODIUM (PORCINE) LOCK FLUSH IV SOLN 100 UNIT/ML 100 UNIT/ML
SOLUTION INTRAVENOUS
Status: COMPLETED
Start: 2021-12-19 | End: 2021-12-19

## 2021-12-19 RX ADMIN — SODIUM CHLORIDE 1000 ML: 9 INJECTION, SOLUTION INTRAVENOUS at 12:00

## 2021-12-19 RX ADMIN — PROMETHAZINE HYDROCHLORIDE 12.5 MG: 25 INJECTION INTRAMUSCULAR; INTRAVENOUS at 13:52

## 2021-12-19 RX ADMIN — DEXAMETHASONE SODIUM PHOSPHATE 10 MG: 10 INJECTION INTRAMUSCULAR; INTRAVENOUS at 12:04

## 2021-12-19 RX ADMIN — DIPHENHYDRAMINE HYDROCHLORIDE 12.5 MG: 50 INJECTION, SOLUTION INTRAMUSCULAR; INTRAVENOUS at 12:00

## 2021-12-19 RX ADMIN — Medication 300 UNITS: at 14:23

## 2021-12-19 RX ADMIN — HEPARIN SODIUM (PORCINE) LOCK FLUSH IV SOLN 100 UNIT/ML 300 UNITS: 100 SOLUTION at 14:23

## 2021-12-19 RX ADMIN — BUTORPHANOL TARTRATE 1 MG: 1 INJECTION, SOLUTION INTRAMUSCULAR; INTRAVENOUS at 13:52

## 2021-12-19 RX ADMIN — BUTORPHANOL TARTRATE 1 MG: 1 INJECTION, SOLUTION INTRAMUSCULAR; INTRAVENOUS at 12:02

## 2021-12-19 NOTE — ED PROVIDER NOTES
Subjective   46-year-old white female presents secondary to headache.  Patient states this started approximately 3 to 4 days ago.  She has been vomiting with this.  She states this is typical for her migraines.  This is a generalized throbbing headache with photophobia.  She denies any fever.  She denies any neck pain.  She states at times she has had some left-sided abdominal discomfort but not currently.  She denies any new burning with urination urinary frequency urgency.  No flank pain.  No other complaints at this time.          Review of Systems   Constitutional: Negative.  Negative for fever.   Respiratory: Negative.    Cardiovascular: Negative.  Negative for chest pain.   Gastrointestinal: Positive for nausea and vomiting. Negative for abdominal pain.   Endocrine: Negative.    Genitourinary: Negative.  Negative for dysuria.   Skin: Negative.    Neurological: Positive for headaches.   Psychiatric/Behavioral: Negative.    All other systems reviewed and are negative.      Past Medical History:   Diagnosis Date   • Abdominal pain    • Abdominal swelling    • Hoyos's disease    • Bipolar 1 disorder (HCC)    • Brain tumor (benign) (HCC)    • Brain tumor (HCC)     R Frontal Lobe per pt   • Brain tumor (HCC) 2014   • Constipation    • DDD (degenerative disc disease), cervical 05/29/2017   • DDD (degenerative disc disease), cervical    • Diarrhea    • Fibromyalgia    • IBS (irritable bowel syndrome)    • Migraine    • Nausea & vomiting    • PONV (postoperative nausea and vomiting)    • PTSD (post-traumatic stress disorder)    • Rectal bleeding        Allergies   Allergen Reactions   • Ativan [Lorazepam] Hallucinations     confusion   • Sulfa Antibiotics Shortness Of Breath and Swelling   • Sulfa Antibiotics Anaphylaxis   • Reglan [Metoclopramide] Angioedema   • Compazine [Prochlorperazine Edisylate] Hives   • Demerol [Meperidine] Hives   • Droperidol Itching   • Metoclopramide Swelling   • Toradol [Ketorolac  Tromethamine] Hives and Itching   • Toradol [Ketorolac Tromethamine] Hives   • Zofran [Ondansetron Hcl] Rash   • Zosyn [Piperacillin Sod-Tazobactam So] Hives       Past Surgical History:   Procedure Laterality Date   • ANAL SCOPE N/A 2016    Procedure: ANAL SCOPE;  Surgeon: Kael Lopez MD;  Location: [facility] OR;  Service:    • APPENDECTOMY     • COLONOSCOPY N/A 2016    Procedure: COLONOSCOPY  CPTCODE:88186;  Surgeon: Jose Antonio Belle III, MD;  Location: [facility] OR;  Service:    • COLONOSCOPY N/A 2016    Procedure: COLONOSCOPY (25884) CPT;  Surgeon: Jose Antonio Belle III, MD;  Location: [facility] OR;  Service:    • CYSTOSCOPY RETROGRADE PYELOGRAM Bilateral 2017    Procedure: CYSTOSCOPY RETROGRADE PYELOGRAM;  Surgeon: Mu Blunt MD;  Location: [facility] OR;  Service:    • ENDOSCOPY N/A 2016    Procedure: ESOPHAGOGASTRODUODENOSCOPY WITH BIOPSY  CPTCODE:36964;  Surgeon: Jose Antonio Belle III, MD;  Location: [facility] OR;  Service:    • HEMORRHOIDECTOMY N/A 2016    Procedure: HEMORRHOID STAPLING;  Surgeon: Kael Lopez MD;  Location: [facility] OR;  Service:    • HYSTERECTOMY     • KNEE SURGERY     • LAPAROSCOPIC SALPINGOOPHERECTOMY     • PORTACATH PLACEMENT N/A 2017    Procedure: INSERTION OF PORTACATH;  Surgeon: Celso Arredondo MD;  Location: [facility] OR;  Service:    • SHOULDER SURGERY      3 times       Family History   Problem Relation Age of Onset   • Crohn's disease Other    • Hypertension Other    • Diabetes Other    • Irritable bowel syndrome Other    • No Known Problems Father    • No Known Problems Mother        Social History     Socioeconomic History   • Marital status:    Tobacco Use   • Smoking status: Former Smoker     Packs/day: 1.00     Years: 20.00     Pack years: 20.00     Quit date: 2015     Years since quittin.1   • Smokeless tobacco: Never Used   Substance and Sexual Activity   • Alcohol use: No   • Drug use: Yes     Types: Marijuana      Comment: for pain   • Sexual activity: Defer     Birth control/protection: Surgical           Objective   Physical Exam  Vitals and nursing note reviewed.   Constitutional:       General: She is not in acute distress.     Appearance: She is well-developed. She is not diaphoretic.   HENT:      Head: Normocephalic and atraumatic.      Right Ear: External ear normal.      Left Ear: External ear normal.      Nose: Nose normal.   Eyes:      Conjunctiva/sclera: Conjunctivae normal.      Pupils: Pupils are equal, round, and reactive to light.   Neck:      Vascular: No JVD.      Trachea: No tracheal deviation.   Cardiovascular:      Rate and Rhythm: Normal rate and regular rhythm.      Heart sounds: Normal heart sounds. No murmur heard.      Pulmonary:      Effort: Pulmonary effort is normal. No respiratory distress.      Breath sounds: Normal breath sounds. No wheezing.   Abdominal:      General: Bowel sounds are normal.      Palpations: Abdomen is soft.      Tenderness: There is no abdominal tenderness.   Musculoskeletal:         General: No deformity. Normal range of motion.      Cervical back: Normal range of motion and neck supple.   Skin:     General: Skin is warm and dry.      Coloration: Skin is not pale.      Findings: No erythema or rash.   Neurological:      Mental Status: She is alert and oriented to person, place, and time.      Cranial Nerves: No cranial nerve deficit.   Psychiatric:         Behavior: Behavior normal.         Thought Content: Thought content normal. Thought content does not include homicidal or suicidal ideation.         Procedures           ED Course  ED Course as of 12/19/21 1426   Sun Dec 19, 2021   1208 Patient has chronic hematuria.  She has to self cath.  She has had a history of urethral stricture.  She is followed by Dr. Verma.  She denies any flank pain.  She has had multiple CTs.  We will refer her back to Dr. Verma in regards to this. [JI]      ED Course User Index  [JI]  Girish Morales PA                                                 [facility]  Number of Diagnoses or Management Options  Migraine without status migrainosus, not intractable, unspecified migraine type: established and worsening  Non-intractable vomiting with nausea, unspecified vomiting type: established and worsening     Amount and/or Complexity of Data Reviewed  Clinical lab tests: reviewed and ordered        Final diagnoses:   Migraine without status migrainosus, not intractable, unspecified migraine type   Non-intractable vomiting with nausea, unspecified vomiting type       ED Disposition  ED Disposition     ED Disposition Condition Comment    Discharge Stable           Mabel Patterson, APRN  [address] [facility] [address]  [phone]    Schedule an appointment as soon as possible for a visit       Mu Blunt MD  [address] [facility]  [address]  [place] [address]  [phone]    Schedule an appointment as soon as possible for a visit            Where to Get Your Medications      You can get these medications from any pharmacy    Bring a paper prescription for each of these medications  · promethazine 25 MG tablet        Medication List      No changes were made to your prescriptions during this visit.          Girish Morales PA  12/19/21 1947

## 2021-12-27 ENCOUNTER — HOSPITAL ENCOUNTER (EMERGENCY)
Facility: HOSPITAL | Age: 46
Discharge: HOME | End: 2021-12-27
Attending: EMERGENCY MEDICINE | Admitting: EMERGENCY MEDICINE

## 2021-12-27 VITALS
SYSTOLIC BLOOD PRESSURE: 131 MMHG | OXYGEN SATURATION: 100 % | HEART RATE: 72 BPM | TEMPERATURE: 98 F | HEIGHT: 68 IN | DIASTOLIC BLOOD PRESSURE: 61 MMHG | BODY MASS INDEX: 19.25 KG/M2 | WEIGHT: 127 LBS | RESPIRATION RATE: 16 BRPM

## 2021-12-27 DIAGNOSIS — G43.009 MIGRAINE WITHOUT AURA AND WITHOUT STATUS MIGRAINOSUS, NOT INTRACTABLE: Primary | ICD-10-CM

## 2021-12-27 LAB
ALBUMIN SERPL-MCNC: 4.91 G/DL (ref 3.5–5.2)
ALBUMIN/GLOB SERPL: 1.9 G/DL
ALP SERPL-CCNC: 90 U/L (ref 39–117)
ALT SERPL W P-5'-P-CCNC: 33 U/L (ref 1–33)
ANION GAP SERPL CALCULATED.3IONS-SCNC: 13.8 MMOL/L (ref 5–15)
AST SERPL-CCNC: 34 U/L (ref 1–32)
BACTERIA UR QL AUTO: ABNORMAL /HPF
BASOPHILS # BLD AUTO: 0.02 10*3/MM3 (ref 0–0.2)
BASOPHILS NFR BLD AUTO: 0.3 % (ref 0–1.5)
BILIRUB SERPL-MCNC: 0.5 MG/DL (ref 0–1.2)
BILIRUB UR QL STRIP: NEGATIVE
BUN SERPL-MCNC: 5 MG/DL (ref 6–20)
BUN/CREAT SERPL: 6.2 (ref 7–25)
CALCIUM SPEC-SCNC: 9.5 MG/DL (ref 8.6–10.5)
CHLORIDE SERPL-SCNC: 103 MMOL/L (ref 98–107)
CLARITY UR: ABNORMAL
CO2 SERPL-SCNC: 27.2 MMOL/L (ref 22–29)
COLOR UR: ABNORMAL
CREAT SERPL-MCNC: 0.81 MG/DL (ref 0.57–1)
DEPRECATED RDW RBC AUTO: 44.8 FL (ref 37–54)
EOSINOPHIL # BLD AUTO: 0.1 10*3/MM3 (ref 0–0.4)
EOSINOPHIL NFR BLD AUTO: 1.5 % (ref 0.3–6.2)
ERYTHROCYTE [DISTWIDTH] IN BLOOD BY AUTOMATED COUNT: 13.4 % (ref 12.3–15.4)
GFR SERPL CREATININE-BSD FRML MDRD: 76 ML/MIN/1.73
GLOBULIN UR ELPH-MCNC: 2.6 GM/DL
GLUCOSE SERPL-MCNC: 75 MG/DL (ref 65–99)
GLUCOSE UR STRIP-MCNC: NEGATIVE MG/DL
HCT VFR BLD AUTO: 47.8 % (ref 34–46.6)
HGB BLD-MCNC: 16 G/DL (ref 12–15.9)
HGB UR QL STRIP.AUTO: ABNORMAL
HOLD SPECIMEN: NORMAL
HOLD SPECIMEN: NORMAL
HYALINE CASTS UR QL AUTO: ABNORMAL /LPF
IMM GRANULOCYTES # BLD AUTO: 0.03 10*3/MM3 (ref 0–0.05)
IMM GRANULOCYTES NFR BLD AUTO: 0.4 % (ref 0–0.5)
KETONES UR QL STRIP: NEGATIVE
LEUKOCYTE ESTERASE UR QL STRIP.AUTO: ABNORMAL
LYMPHOCYTES # BLD AUTO: 2.06 10*3/MM3 (ref 0.7–3.1)
LYMPHOCYTES NFR BLD AUTO: 30.1 % (ref 19.6–45.3)
MCH RBC QN AUTO: 30.2 PG (ref 26.6–33)
MCHC RBC AUTO-ENTMCNC: 33.5 G/DL (ref 31.5–35.7)
MCV RBC AUTO: 90.4 FL (ref 79–97)
MONOCYTES # BLD AUTO: 0.39 10*3/MM3 (ref 0.1–0.9)
MONOCYTES NFR BLD AUTO: 5.7 % (ref 5–12)
NEUTROPHILS NFR BLD AUTO: 4.25 10*3/MM3 (ref 1.7–7)
NEUTROPHILS NFR BLD AUTO: 62 % (ref 42.7–76)
NITRITE UR QL STRIP: NEGATIVE
NRBC BLD AUTO-RTO: 0 /100 WBC (ref 0–0.2)
PH UR STRIP.AUTO: 7 [PH] (ref 5–8)
PLATELET # BLD AUTO: 148 10*3/MM3 (ref 140–450)
PMV BLD AUTO: 11 FL (ref 6–12)
POTASSIUM SERPL-SCNC: 3.5 MMOL/L (ref 3.5–5.2)
PROT SERPL-MCNC: 7.5 G/DL (ref 6–8.5)
PROT UR QL STRIP: ABNORMAL
RBC # BLD AUTO: 5.29 10*6/MM3 (ref 3.77–5.28)
RBC # UR STRIP: ABNORMAL /HPF
REF LAB TEST METHOD: ABNORMAL
SODIUM SERPL-SCNC: 144 MMOL/L (ref 136–145)
SP GR UR STRIP: 1.01 (ref 1–1.03)
SQUAMOUS #/AREA URNS HPF: ABNORMAL /HPF
UROBILINOGEN UR QL STRIP: ABNORMAL
WBC # UR STRIP: ABNORMAL /HPF
WBC NRBC COR # BLD: 6.85 10*3/MM3 (ref 3.4–10.8)
WHOLE BLOOD HOLD SPECIMEN: NORMAL
WHOLE BLOOD HOLD SPECIMEN: NORMAL

## 2021-12-27 PROCEDURE — 99283 EMERGENCY DEPT VISIT LOW MDM: CPT

## 2021-12-27 PROCEDURE — 85025 COMPLETE CBC W/AUTO DIFF WBC: CPT | Performed by: NURSE PRACTITIONER

## 2021-12-27 PROCEDURE — 81001 URINALYSIS AUTO W/SCOPE: CPT | Performed by: NURSE PRACTITIONER

## 2021-12-27 PROCEDURE — 25010000002 DIPHENHYDRAMINE PER 50 MG: Performed by: PHYSICIAN ASSISTANT

## 2021-12-27 PROCEDURE — 25010000002 MORPHINE PER 10 MG: Performed by: EMERGENCY MEDICINE

## 2021-12-27 PROCEDURE — 96374 THER/PROPH/DIAG INJ IV PUSH: CPT

## 2021-12-27 PROCEDURE — 25010000002 BUTORPHANOL PER 1 MG: Performed by: EMERGENCY MEDICINE

## 2021-12-27 PROCEDURE — 96375 TX/PRO/DX INJ NEW DRUG ADDON: CPT

## 2021-12-27 PROCEDURE — 80053 COMPREHEN METABOLIC PANEL: CPT | Performed by: NURSE PRACTITIONER

## 2021-12-27 PROCEDURE — 25010000002 METHYLPREDNISOLONE PER 40 MG: Performed by: PHYSICIAN ASSISTANT

## 2021-12-27 PROCEDURE — 25010000002 PROMETHAZINE PER 50 MG: Performed by: PHYSICIAN ASSISTANT

## 2021-12-27 PROCEDURE — 36415 COLL VENOUS BLD VENIPUNCTURE: CPT

## 2021-12-27 RX ORDER — METHYLPREDNISOLONE SODIUM SUCCINATE 40 MG/ML
80 INJECTION, POWDER, LYOPHILIZED, FOR SOLUTION INTRAMUSCULAR; INTRAVENOUS ONCE
Status: COMPLETED | OUTPATIENT
Start: 2021-12-27 | End: 2021-12-27

## 2021-12-27 RX ORDER — SODIUM CHLORIDE 0.9 % (FLUSH) 0.9 %
10 SYRINGE (ML) INJECTION AS NEEDED
Status: DISCONTINUED | OUTPATIENT
Start: 2021-12-27 | End: 2021-12-27 | Stop reason: HOSPADM

## 2021-12-27 RX ORDER — DEXAMETHASONE SODIUM PHOSPHATE 10 MG/ML
10 INJECTION INTRAMUSCULAR; INTRAVENOUS ONCE
Status: DISCONTINUED | OUTPATIENT
Start: 2021-12-27 | End: 2021-12-27

## 2021-12-27 RX ORDER — DIPHENHYDRAMINE HYDROCHLORIDE 50 MG/ML
12.5 INJECTION INTRAMUSCULAR; INTRAVENOUS ONCE
Status: COMPLETED | OUTPATIENT
Start: 2021-12-27 | End: 2021-12-27

## 2021-12-27 RX ORDER — BUTORPHANOL TARTRATE 1 MG/ML
1 INJECTION, SOLUTION INTRAMUSCULAR; INTRAVENOUS ONCE
Status: COMPLETED | OUTPATIENT
Start: 2021-12-27 | End: 2021-12-27

## 2021-12-27 RX ORDER — MORPHINE SULFATE 2 MG/ML
2 INJECTION, SOLUTION INTRAMUSCULAR; INTRAVENOUS ONCE
Status: COMPLETED | OUTPATIENT
Start: 2021-12-27 | End: 2021-12-27

## 2021-12-27 RX ADMIN — METHYLPREDNISOLONE SODIUM SUCCINATE 80 MG: 40 INJECTION, POWDER, FOR SOLUTION INTRAMUSCULAR; INTRAVENOUS at 18:55

## 2021-12-27 RX ADMIN — BUTORPHANOL TARTRATE 1 MG: 1 INJECTION, SOLUTION INTRAMUSCULAR; INTRAVENOUS at 18:40

## 2021-12-27 RX ADMIN — MORPHINE SULFATE 2 MG: 2 INJECTION, SOLUTION INTRAMUSCULAR; INTRAVENOUS at 19:53

## 2021-12-27 RX ADMIN — DIPHENHYDRAMINE HYDROCHLORIDE 12.5 MG: 50 INJECTION, SOLUTION INTRAMUSCULAR; INTRAVENOUS at 18:39

## 2021-12-27 RX ADMIN — PROMETHAZINE HYDROCHLORIDE 12.5 MG: 25 INJECTION INTRAMUSCULAR; INTRAVENOUS at 18:54

## 2021-12-27 RX ADMIN — SODIUM CHLORIDE 1000 ML: 9 INJECTION, SOLUTION INTRAVENOUS at 18:40

## 2021-12-27 NOTE — ED NOTES
MEDICAL SCREENING:    Reason for Visit: Headache x 8 days. Hx of migraines.    Patient initially seen in triage.  The patient was advised further evaluation and diagnostic testing will be needed, some of the treatment and testing will be initiated in the lobby in order to begin the process.  The patient will be returned to the waiting area for the time being and possibly be re-assessed by a subsequent ED provider.  The patient will be brought back to the treatment area in as timely manner as possible.       Tami Bianchi, APRN  12/27/21 1521

## 2021-12-27 NOTE — ED PROVIDER NOTES
Subjective   This is a 46 year old female patient who presents to the ER with chief complaint of migraine. Patient has a PMH significant for migraines. She has had a right sided frontal migraine for 8 days. She says it feels like her typical migraine, sensitive to light and having associated nausea and vomiting. No visual changes. She is under a lot of stress which she believes brought this on.           Review of Systems   Constitutional: Negative.  Negative for fever.   Respiratory: Negative.    Cardiovascular: Negative.  Negative for chest pain.   Gastrointestinal: Negative.  Negative for abdominal pain.   Endocrine: Negative.    Genitourinary: Negative.  Negative for dysuria.   Skin: Negative.    Neurological: Positive for headaches. Negative for dizziness, tremors, seizures, syncope, facial asymmetry, speech difficulty, weakness, light-headedness and numbness.   Psychiatric/Behavioral: Negative.    All other systems reviewed and are negative.      Past Medical History:   Diagnosis Date   • Abdominal pain    • Abdominal swelling    • Hoyos's disease    • Bipolar 1 disorder (HCC)    • Brain tumor (benign) (HCC)    • Brain tumor (HCC)     R Frontal Lobe per pt   • Brain tumor (HCC) 2014   • Constipation    • DDD (degenerative disc disease), cervical 05/29/2017   • DDD (degenerative disc disease), cervical    • Diarrhea    • Fibromyalgia    • IBS (irritable bowel syndrome)    • Migraine    • Nausea & vomiting    • PONV (postoperative nausea and vomiting)    • PTSD (post-traumatic stress disorder)    • Rectal bleeding        Allergies   Allergen Reactions   • Ativan [Lorazepam] Hallucinations     confusion   • Sulfa Antibiotics Shortness Of Breath and Swelling   • Sulfa Antibiotics Anaphylaxis   • Reglan [Metoclopramide] Angioedema   • Compazine [Prochlorperazine Edisylate] Hives   • Demerol [Meperidine] Hives   • Droperidol Itching   • Metoclopramide Swelling   • Toradol [Ketorolac Tromethamine] Hives and Itching    • Toradol [Ketorolac Tromethamine] Hives   • Zofran [Ondansetron Hcl] Rash   • Zosyn [Piperacillin Sod-Tazobactam So] Hives       Past Surgical History:   Procedure Laterality Date   • ANAL SCOPE N/A 2016    Procedure: ANAL SCOPE;  Surgeon: Kael Lopez MD;  Location: [facility] OR;  Service:    • APPENDECTOMY     • COLONOSCOPY N/A 2016    Procedure: COLONOSCOPY  CPTCODE:16072;  Surgeon: Jose Antonio Belle III, MD;  Location: [facility] OR;  Service:    • COLONOSCOPY N/A 2016    Procedure: COLONOSCOPY (38543) CPT;  Surgeon: Jose Antonio Belle III, MD;  Location: [facility] OR;  Service:    • CYSTOSCOPY RETROGRADE PYELOGRAM Bilateral 2017    Procedure: CYSTOSCOPY RETROGRADE PYELOGRAM;  Surgeon: Mu Blunt MD;  Location: [facility] OR;  Service:    • ENDOSCOPY N/A 2016    Procedure: ESOPHAGOGASTRODUODENOSCOPY WITH BIOPSY  CPTCODE:40023;  Surgeon: Jose Antonio Belle III, MD;  Location: [facility] OR;  Service:    • HEMORRHOIDECTOMY N/A 2016    Procedure: HEMORRHOID STAPLING;  Surgeon: Kael Lopez MD;  Location: [facility] OR;  Service:    • HYSTERECTOMY     • KNEE SURGERY     • LAPAROSCOPIC SALPINGOOPHERECTOMY     • PORTACATH PLACEMENT N/A 2017    Procedure: INSERTION OF PORTACATH;  Surgeon: Celso Arredondo MD;  Location: [facility] OR;  Service:    • SHOULDER SURGERY      3 times       Family History   Problem Relation Age of Onset   • Crohn's disease Other    • Hypertension Other    • Diabetes Other    • Irritable bowel syndrome Other    • No Known Problems Father    • No Known Problems Mother        Social History     Socioeconomic History   • Marital status:    Tobacco Use   • Smoking status: Former Smoker     Packs/day: 1.00     Years: 20.00     Pack years: 20.00     Quit date: 2015     Years since quittin.1   • Smokeless tobacco: Never Used   Substance and Sexual Activity   • Alcohol use: No   • Drug use: Yes     Types: Marijuana     Comment: for pain   • Sexual  activity: Defer     Birth control/protection: Surgical           Objective   Physical Exam  Vitals and nursing note reviewed.   Constitutional:       General: She is not in acute distress.     Appearance: She is well-developed. She is not diaphoretic.   HENT:      Head: Normocephalic and atraumatic.      Right Ear: External ear normal.      Left Ear: External ear normal.      Nose: Nose normal.   Eyes:      Conjunctiva/sclera: Conjunctivae normal.      Pupils: Pupils are equal, round, and reactive to light.   Neck:      Vascular: No JVD.      Trachea: No tracheal deviation.   Cardiovascular:      Rate and Rhythm: Normal rate and regular rhythm.      Heart sounds: Normal heart sounds. No murmur heard.      Pulmonary:      Effort: Pulmonary effort is normal. No respiratory distress.      Breath sounds: Normal breath sounds. No wheezing.   Abdominal:      General: Bowel sounds are normal.      Palpations: Abdomen is soft.      Tenderness: There is no abdominal tenderness.   Musculoskeletal:         General: No deformity. Normal range of motion.      Cervical back: Normal range of motion and neck supple.   Skin:     General: Skin is warm and dry.      Coloration: Skin is not pale.      Findings: No erythema or rash.   Neurological:      General: No focal deficit present.      Mental Status: She is alert and oriented to person, place, and time. Mental status is at baseline.      Cranial Nerves: No cranial nerve deficit.      Sensory: No sensory deficit.      Motor: No weakness.      Coordination: Coordination normal.      Gait: Gait normal.      Deep Tendon Reflexes: Reflexes normal.      Comments: Normal EOMs and pupillary reflexes bilaterally. 5/5  and plantar flexion strength bilaterally. Can move all 4 extremities and hold them at 90 degrees. NO facial asymmetry or slurred speech.    Psychiatric:         Behavior: Behavior normal.         Thought Content: Thought content normal.         Procedures           ED  Course  ED Course as of 12/27/21 1921   Mon Dec 27, 2021   1917 Patient's pain much improved. Will be d/c home and f/u with PCP in 2 days or return to ER if symptoms worsen.  [MM]      ED Course User Index  [MM] Anya Fuentes PA                                                 [facility]  Number of Diagnoses or Management Options     Amount and/or Complexity of Data Reviewed  Clinical lab tests: ordered and reviewed        Final diagnoses:   Migraine without aura and without status migrainosus, not intractable       ED Disposition  ED Disposition     ED Disposition Condition Comment    Discharge Stable           Mabel Patterson, APRN  [address] [facility] [address]  [phone]    In 2 days           Medication List      No changes were made to your prescriptions during this visit.          Anya Fuentes PA  12/27/21 1921

## 2021-12-28 ENCOUNTER — OFFICE VISIT (OUTPATIENT)
Dept: UROLOGY | Facility: CLINIC | Age: 46
End: 2021-12-28

## 2021-12-28 VITALS — HEIGHT: 68 IN | BODY MASS INDEX: 19.25 KG/M2 | WEIGHT: 126.98 LBS

## 2021-12-28 DIAGNOSIS — N23 RENAL COLIC: Primary | ICD-10-CM

## 2021-12-28 DIAGNOSIS — N35.028 OTHER POST-TRAUMATIC URETHRAL STRICTURE, FEMALE: ICD-10-CM

## 2021-12-28 PROCEDURE — 99213 OFFICE O/P EST LOW 20 MIN: CPT | Performed by: UROLOGY

## 2021-12-28 PROCEDURE — 96372 THER/PROPH/DIAG INJ SC/IM: CPT | Performed by: UROLOGY

## 2021-12-28 PROCEDURE — 53661 DILATION OF URETHRA: CPT | Performed by: UROLOGY

## 2021-12-28 RX ORDER — GENTAMICIN SULFATE 40 MG/ML
80 INJECTION, SOLUTION INTRAMUSCULAR; INTRAVENOUS ONCE
Status: COMPLETED | OUTPATIENT
Start: 2021-12-28 | End: 2021-12-28

## 2021-12-28 RX ORDER — OXYCODONE AND ACETAMINOPHEN 10; 325 MG/1; MG/1
1 TABLET ORAL EVERY 6 HOURS PRN
Qty: 10 TABLET | Refills: 0 | Status: SHIPPED | OUTPATIENT
Start: 2021-12-28 | End: 2022-01-25 | Stop reason: SDUPTHER

## 2021-12-28 RX ADMIN — GENTAMICIN SULFATE 80 MG: 40 INJECTION, SOLUTION INTRAMUSCULAR; INTRAVENOUS at 10:59

## 2021-12-28 NOTE — PROGRESS NOTES
Chief Complaint:          Chief Complaint   Patient presents with   • urethral stricture     dilatation       HPI:   46 y.o. female returns today.  Extremely well-known to me.  Has severe post traumatic urethral stricture she needs to be dilated about every month she is complaining of a decreased force of stream and severe pain following an informed consent I dilated to 26 French today.  It was again extremely tight.      Past Medical History:        Past Medical History:   Diagnosis Date   • Abdominal pain    • Abdominal swelling    • Hoyos's disease    • Bipolar 1 disorder (HCC)    • Brain tumor (benign) (HCC)    • Brain tumor (HCC)     R Frontal Lobe per pt   • Brain tumor (HCC) 2014   • Constipation    • DDD (degenerative disc disease), cervical 05/29/2017   • DDD (degenerative disc disease), cervical    • Diarrhea    • Fibromyalgia    • IBS (irritable bowel syndrome)    • Migraine    • Nausea & vomiting    • PONV (postoperative nausea and vomiting)    • PTSD (post-traumatic stress disorder)    • Rectal bleeding          Current Meds:     Current Outpatient Medications   Medication Sig Dispense Refill   • albuterol (PROVENTIL HFA;VENTOLIN HFA) 108 (90 Base) MCG/ACT inhaler Inhale 2 puffs 2 (Two) Times a Day.     • Azelastine HCl 137 MCG/SPRAY solution 1 spray into each nostril As Needed (Allergies).     • busPIRone (BUSPAR) 15 MG tablet Take 15 mg by mouth 3 (three) times a day        • cyclobenzaprine (FLEXERIL) 5 MG tablet      • diclofenac (VOLTAREN) 1 % gel gel      • divalproex (DEPAKOTE) 500 MG DR tablet Take 1 tablet by mouth 3 (Three) Times a Day. 90 tablet 2   • docusate sodium (COLACE) 100 MG capsule Take 1 capsule by mouth 2 (Two) Times a Day. 20 capsule 0   • doxycycline (MONODOX) 100 MG capsule Take 1 capsule by mouth 2 (Two) Times a Day. 14 capsule 0   • fluticasone (VERAMYST) 27.5 MCG/SPRAY nasal spray 2 sprays into each nostril Daily.     • montelukast (SINGULAIR) 10 MG tablet Take 10 mg by  mouth Every Night.     • oxyCODONE-acetaminophen (Percocet)  MG per tablet Take 1 tablet by mouth Every 6 (Six) Hours As Needed for Moderate Pain . 10 tablet 0   • oxyCODONE-acetaminophen (PERCOCET) 5-325 MG per tablet Take 1 tablet by mouth Every 8 (Eight) Hours As Needed for Moderate Pain . 4 tablet 0   • pantoprazole (PROTONIX) 40 MG EC tablet Take 40 mg by mouth 2 (Two) Times a Day As Needed.     • phenazopyridine (PYRIDIUM) 100 MG tablet      • polyethylene glycol (MIRALAX) 17 GM/SCOOP powder Take 17 g by mouth Daily. 225 g 0   • promethazine (PHENERGAN) 25 MG tablet Take 1 tablet by mouth Every 6 (Six) Hours As Needed for Nausea or Vomiting. 15 tablet 0   • sertraline (ZOLOFT) 100 MG tablet Take 100 mg by mouth 2 (Two) Times a Day.     • SUMAtriptan (IMITREX) 6 MG/0.5ML solution injection Inject 6 mg under the skin 1 (One) Time.     • tiZANidine (ZANAFLEX) 4 MG tablet      • traMADol (ULTRAM) 50 MG tablet        No current facility-administered medications for this visit.        Allergies:      Allergies   Allergen Reactions   • Ativan [Lorazepam] Hallucinations     confusion   • Sulfa Antibiotics Shortness Of Breath and Swelling   • Sulfa Antibiotics Anaphylaxis   • Reglan [Metoclopramide] Angioedema   • Compazine [Prochlorperazine Edisylate] Hives   • Demerol [Meperidine] Hives   • Droperidol Itching   • Metoclopramide Swelling   • Toradol [Ketorolac Tromethamine] Hives and Itching   • Toradol [Ketorolac Tromethamine] Hives   • Zofran [Ondansetron Hcl] Rash   • Zosyn [Piperacillin Sod-Tazobactam So] Hives        Past Surgical History:     Past Surgical History:   Procedure Laterality Date   • ANAL SCOPE N/A 7/28/2016    Procedure: ANAL SCOPE;  Surgeon: Kael Lopez MD;  Location: [facility] OR;  Service:    • APPENDECTOMY     • COLONOSCOPY N/A 6/30/2016    Procedure: COLONOSCOPY  CPTCODE:81345;  Surgeon: Jose Antonio Belle III, MD;  Location: [facility] OR;  Service:    • COLONOSCOPY N/A 7/7/2016     Procedure: COLONOSCOPY (29271) CPT;  Surgeon: Jose Antonio Belle III, MD;  Location: [facility] OR;  Service:    • CYSTOSCOPY RETROGRADE PYELOGRAM Bilateral 2017    Procedure: CYSTOSCOPY RETROGRADE PYELOGRAM;  Surgeon: Mu Blunt MD;  Location: [facility] OR;  Service:    • ENDOSCOPY N/A 2016    Procedure: ESOPHAGOGASTRODUODENOSCOPY WITH BIOPSY  CPTCODE:65432;  Surgeon: Jose Antonio Belle III, MD;  Location: [facility] OR;  Service:    • HEMORRHOIDECTOMY N/A 2016    Procedure: HEMORRHOID STAPLING;  Surgeon: Kael Lopez MD;  Location: [facility] OR;  Service:    • HYSTERECTOMY     • KNEE SURGERY     • LAPAROSCOPIC SALPINGOOPHERECTOMY     • PORTACATH PLACEMENT N/A 2017    Procedure: INSERTION OF PORTACATH;  Surgeon: Celso Arredondo MD;  Location: [facility];  Service:    • SHOULDER SURGERY      3 times         Social History:     Social History     Socioeconomic History   • Marital status:    Tobacco Use   • Smoking status: Former Smoker     Packs/day: 1.00     Years: 20.00     Pack years: 20.00     Quit date: 2015     Years since quittin.1   • Smokeless tobacco: Never Used   Substance and Sexual Activity   • Alcohol use: No   • Drug use: Yes     Types: Marijuana     Comment: for pain   • Sexual activity: Defer     Birth control/protection: Surgical       Family History:     Family History   Problem Relation Age of Onset   • Crohn's disease Other    • Hypertension Other    • Diabetes Other    • Irritable bowel syndrome Other    • No Known Problems Father    • No Known Problems Mother        Review of Systems:     Review of Systems   Constitutional: Negative.  Negative for activity change, appetite change, chills, diaphoresis, fatigue and unexpected weight change.   HENT: Negative for congestion, dental problem, drooling, ear discharge, ear pain, facial swelling, hearing loss, mouth sores, nosebleeds, postnasal drip, rhinorrhea, sinus pressure, sneezing, sore throat, tinnitus,  trouble swallowing and voice change.    Eyes: Negative.  Negative for photophobia, pain, discharge, redness, itching and visual disturbance.   Respiratory: Negative.  Negative for apnea, cough, choking, chest tightness, shortness of breath, wheezing and stridor.    Cardiovascular: Negative.  Negative for chest pain, palpitations and leg swelling.   Gastrointestinal: Negative.  Negative for abdominal distention, abdominal pain, anal bleeding, blood in stool, constipation, diarrhea, nausea, rectal pain and vomiting.   Endocrine: Negative.  Negative for cold intolerance, heat intolerance, polydipsia, polyphagia and polyuria.   Genitourinary: Positive for difficulty urinating.   Musculoskeletal: Negative.  Negative for arthralgias, back pain, gait problem, joint swelling, myalgias, neck pain and neck stiffness.   Skin: Negative.  Negative for color change, pallor, rash and wound.   Allergic/Immunologic: Negative.  Negative for environmental allergies, food allergies and immunocompromised state.   Neurological: Negative.  Negative for dizziness, tremors, seizures, syncope, facial asymmetry, speech difficulty, weakness, light-headedness, numbness and headaches.   Hematological: Negative.  Negative for adenopathy. Does not bruise/bleed easily.   Psychiatric/Behavioral: Negative for agitation, behavioral problems, confusion, decreased concentration, dysphoric mood, hallucinations, self-injury, sleep disturbance and suicidal ideas. The patient is not nervous/anxious and is not hyperactive.    All other systems reviewed and are negative.      Physical Exam:     Physical Exam  Constitutional:       Appearance: She is well-developed.   HENT:      Head: Normocephalic and atraumatic.      Right Ear: External ear normal.      Left Ear: External ear normal.   Eyes:      Conjunctiva/sclera: Conjunctivae normal.      Pupils: Pupils are equal, round, and reactive to light.   Cardiovascular:      Rate and Rhythm: Normal rate and  regular rhythm.      Heart sounds: Normal heart sounds.   Pulmonary:      Effort: Pulmonary effort is normal.      Breath sounds: Normal breath sounds.   Abdominal:      General: Bowel sounds are normal. There is no distension.      Palpations: Abdomen is soft. There is no mass.      Tenderness: There is no abdominal tenderness. There is no guarding or rebound.   Genitourinary:     General: Normal vulva.      Vagina: No vaginal discharge.   Musculoskeletal:         General: Normal range of motion.   Skin:     General: Skin is warm and dry.   Neurological:      Mental Status: She is alert.      Deep Tendon Reflexes: Reflexes are normal and symmetric.   Psychiatric:         Behavior: Behavior normal.         Thought Content: Thought content normal.         Judgment: Judgment normal.         I have reviewed the following portions of the patient's history: Allergies, current medications, past family history, past medical history, past social history, past surgical history, problem list, and ROS and confirm it is accurate.      Procedure:   Urethral dilation-after an appropriate informed consent, the patient was brought to the procedure suite.  The urethra was gently anesthetized with 10 mL of 2% viscous Xylocaine jelly.  After an adequate period of topical anesthesia I went ahead and the urethra was gently anesthetized and dilated with Reese sounds from 16 to 26 Yakut sequentially without complication. The patient was given gentamicin as prophylaxis with 80 mg.    Assessment/Plan:   Posttraumatic urethral stricture-status post dilation.  Its painful  Narcotic pain medication-patient has significant acute pain that I believe would be an indication for the use of narcotic pain medication.  I discussed the significant risks of pain medication and the fact that this will be a short only option and I will give her no more than a three-day supply of pain medication, I will not plan long-term medication, and that this  will be sent to a pain clinic if at all becomes necessary.  We discussed signing a pain medication agreement and the fact that we're going to run a state [place]ERT[place] review to be sure the patient is not getting pain medication from elsewhere.  If this is the case, we will not give pain medication as part of the patient's treatment plan of there being prescribed a controlled substance with potential for abuse.  This patient has been well aware of the appropriate dose of such medications including the risks for somnolence, limited ability to drive and/or safety and the significant potential for overdose.  It has been made clear that these medications are for the prescribed patient only without concomitant use of alcohol or other substance unless prescribed by the medical provider.  Has completed prescribing agreement detailing the terms of continue prescribing him a controlled substance including monitoring Luis Alberto reports, the possibility of urine drug screens, and pill counts.  The patient is aware that we review LUIS ALBERTO reports on a regular basis and scan them into the chart.  History and physical examination exhibited continued safe and appropriate use of controlled substances. We also discussed the fact that the new [place] legislation allows only a three-day prescription for pain medication.  In this situation he will be referred to a chronic pain clinic.                This document has been electronically signed by VERENICE FINK MD December 28, 2021 10:47 EST

## 2022-01-14 ENCOUNTER — APPOINTMENT (OUTPATIENT)
Dept: CT IMAGING | Facility: HOSPITAL | Age: 47
End: 2022-01-14

## 2022-01-14 ENCOUNTER — HOSPITAL ENCOUNTER (EMERGENCY)
Facility: HOSPITAL | Age: 47
Discharge: HOME | End: 2022-01-14
Attending: FAMILY MEDICINE | Admitting: FAMILY MEDICINE

## 2022-01-14 VITALS
HEART RATE: 77 BPM | HEIGHT: 68 IN | TEMPERATURE: 98.2 F | WEIGHT: 126 LBS | BODY MASS INDEX: 19.1 KG/M2 | DIASTOLIC BLOOD PRESSURE: 65 MMHG | RESPIRATION RATE: 14 BRPM | OXYGEN SATURATION: 99 % | SYSTOLIC BLOOD PRESSURE: 114 MMHG

## 2022-01-14 DIAGNOSIS — K59.00 CONSTIPATION, UNSPECIFIED CONSTIPATION TYPE: Primary | ICD-10-CM

## 2022-01-14 LAB
ALBUMIN SERPL-MCNC: 4.67 G/DL (ref 3.5–5.2)
ALBUMIN/GLOB SERPL: 1.7 G/DL
ALP SERPL-CCNC: 83 U/L (ref 39–117)
ALT SERPL W P-5'-P-CCNC: 29 U/L (ref 1–33)
ANION GAP SERPL CALCULATED.3IONS-SCNC: 10.4 MMOL/L (ref 5–15)
AST SERPL-CCNC: 35 U/L (ref 1–32)
BACTERIA UR QL AUTO: ABNORMAL /HPF
BASOPHILS # BLD AUTO: 0.02 10*3/MM3 (ref 0–0.2)
BASOPHILS NFR BLD AUTO: 0.5 % (ref 0–1.5)
BILIRUB SERPL-MCNC: 0.8 MG/DL (ref 0–1.2)
BILIRUB UR QL STRIP: NEGATIVE
BUN SERPL-MCNC: 5 MG/DL (ref 6–20)
BUN/CREAT SERPL: 7.7 (ref 7–25)
CALCIUM SPEC-SCNC: 9.4 MG/DL (ref 8.6–10.5)
CHLORIDE SERPL-SCNC: 106 MMOL/L (ref 98–107)
CLARITY UR: ABNORMAL
CO2 SERPL-SCNC: 27.6 MMOL/L (ref 22–29)
COLOR UR: ABNORMAL
CREAT SERPL-MCNC: 0.65 MG/DL (ref 0.57–1)
DEPRECATED RDW RBC AUTO: 45.3 FL (ref 37–54)
EOSINOPHIL # BLD AUTO: 0.12 10*3/MM3 (ref 0–0.4)
EOSINOPHIL NFR BLD AUTO: 2.9 % (ref 0.3–6.2)
ERYTHROCYTE [DISTWIDTH] IN BLOOD BY AUTOMATED COUNT: 13.8 % (ref 12.3–15.4)
GFR SERPL CREATININE-BSD FRML MDRD: 98 ML/MIN/1.73
GLOBULIN UR ELPH-MCNC: 2.7 GM/DL
GLUCOSE SERPL-MCNC: 97 MG/DL (ref 65–99)
GLUCOSE UR STRIP-MCNC: NEGATIVE MG/DL
HCT VFR BLD AUTO: 47.7 % (ref 34–46.6)
HGB BLD-MCNC: 15.7 G/DL (ref 12–15.9)
HGB UR QL STRIP.AUTO: ABNORMAL
HOLD SPECIMEN: NORMAL
HOLD SPECIMEN: NORMAL
HYALINE CASTS UR QL AUTO: ABNORMAL /LPF
IMM GRANULOCYTES # BLD AUTO: 0.01 10*3/MM3 (ref 0–0.05)
IMM GRANULOCYTES NFR BLD AUTO: 0.2 % (ref 0–0.5)
KETONES UR QL STRIP: NEGATIVE
LEUKOCYTE ESTERASE UR QL STRIP.AUTO: ABNORMAL
LIPASE SERPL-CCNC: 43 U/L (ref 13–60)
LYMPHOCYTES # BLD AUTO: 1.67 10*3/MM3 (ref 0.7–3.1)
LYMPHOCYTES NFR BLD AUTO: 40 % (ref 19.6–45.3)
MCH RBC QN AUTO: 29.8 PG (ref 26.6–33)
MCHC RBC AUTO-ENTMCNC: 32.9 G/DL (ref 31.5–35.7)
MCV RBC AUTO: 90.5 FL (ref 79–97)
MONOCYTES # BLD AUTO: 0.3 10*3/MM3 (ref 0.1–0.9)
MONOCYTES NFR BLD AUTO: 7.2 % (ref 5–12)
NEUTROPHILS NFR BLD AUTO: 2.05 10*3/MM3 (ref 1.7–7)
NEUTROPHILS NFR BLD AUTO: 49.2 % (ref 42.7–76)
NITRITE UR QL STRIP: NEGATIVE
NRBC BLD AUTO-RTO: 0 /100 WBC (ref 0–0.2)
PH UR STRIP.AUTO: 8 [PH] (ref 5–8)
PLATELET # BLD AUTO: 157 10*3/MM3 (ref 140–450)
PMV BLD AUTO: 10.9 FL (ref 6–12)
POTASSIUM SERPL-SCNC: 4 MMOL/L (ref 3.5–5.2)
PROT SERPL-MCNC: 7.4 G/DL (ref 6–8.5)
PROT UR QL STRIP: NEGATIVE
RBC # BLD AUTO: 5.27 10*6/MM3 (ref 3.77–5.28)
RBC # UR STRIP: ABNORMAL /HPF
REF LAB TEST METHOD: ABNORMAL
SODIUM SERPL-SCNC: 144 MMOL/L (ref 136–145)
SP GR UR STRIP: 1.01 (ref 1–1.03)
SQUAMOUS #/AREA URNS HPF: ABNORMAL /HPF
UROBILINOGEN UR QL STRIP: ABNORMAL
WBC # UR STRIP: ABNORMAL /HPF
WBC NRBC COR # BLD: 4.17 10*3/MM3 (ref 3.4–10.8)
WHOLE BLOOD HOLD SPECIMEN: NORMAL
WHOLE BLOOD HOLD SPECIMEN: NORMAL

## 2022-01-14 PROCEDURE — 83690 ASSAY OF LIPASE: CPT | Performed by: PHYSICIAN ASSISTANT

## 2022-01-14 PROCEDURE — 63710000001 PROMETHAZINE PER 25 MG: Performed by: PHYSICIAN ASSISTANT

## 2022-01-14 PROCEDURE — 74176 CT ABD & PELVIS W/O CONTRAST: CPT | Performed by: RADIOLOGY

## 2022-01-14 PROCEDURE — 85025 COMPLETE CBC W/AUTO DIFF WBC: CPT | Performed by: PHYSICIAN ASSISTANT

## 2022-01-14 PROCEDURE — 74176 CT ABD & PELVIS W/O CONTRAST: CPT

## 2022-01-14 PROCEDURE — 80053 COMPREHEN METABOLIC PANEL: CPT | Performed by: PHYSICIAN ASSISTANT

## 2022-01-14 PROCEDURE — 99284 EMERGENCY DEPT VISIT MOD MDM: CPT

## 2022-01-14 PROCEDURE — 96374 THER/PROPH/DIAG INJ IV PUSH: CPT

## 2022-01-14 PROCEDURE — 25010000002 MORPHINE PER 10 MG: Performed by: FAMILY MEDICINE

## 2022-01-14 PROCEDURE — 81001 URINALYSIS AUTO W/SCOPE: CPT | Performed by: PHYSICIAN ASSISTANT

## 2022-01-14 RX ORDER — PROMETHAZINE HYDROCHLORIDE 25 MG/1
25 TABLET ORAL ONCE
Status: COMPLETED | OUTPATIENT
Start: 2022-01-14 | End: 2022-01-14

## 2022-01-14 RX ORDER — MORPHINE SULFATE 2 MG/ML
2 INJECTION, SOLUTION INTRAMUSCULAR; INTRAVENOUS ONCE
Status: COMPLETED | OUTPATIENT
Start: 2022-01-14 | End: 2022-01-14

## 2022-01-14 RX ORDER — SODIUM CHLORIDE 0.9 % (FLUSH) 0.9 %
10 SYRINGE (ML) INJECTION AS NEEDED
Status: DISCONTINUED | OUTPATIENT
Start: 2022-01-14 | End: 2022-01-14 | Stop reason: HOSPADM

## 2022-01-14 RX ORDER — DOCUSATE SODIUM 100 MG/1
100 CAPSULE, LIQUID FILLED ORAL 2 TIMES DAILY PRN
Qty: 60 CAPSULE | Refills: 0 | Status: SHIPPED | OUTPATIENT
Start: 2022-01-14

## 2022-01-14 RX ADMIN — MORPHINE SULFATE 2 MG: 2 INJECTION, SOLUTION INTRAMUSCULAR; INTRAVENOUS at 10:29

## 2022-01-14 RX ADMIN — SODIUM CHLORIDE 1000 ML: 9 INJECTION, SOLUTION INTRAVENOUS at 10:28

## 2022-01-14 RX ADMIN — PROMETHAZINE HYDROCHLORIDE 25 MG: 25 TABLET ORAL at 10:29

## 2022-01-14 NOTE — ED PROVIDER NOTES
Subjective   47 yo female patient presents to the ED with complaints of bilateral flank pain and hematuria. Patient states that she thinks she has a kidney stone. She has hx of kidney stones and also has kidney disorder where she has to get to her urethra dilated.  PT reports vomiting. No fevers.       History provided by:  Patient   used: No        Review of Systems   Constitutional: Negative.    HENT: Negative.    Eyes: Negative.    Respiratory: Negative.    Cardiovascular: Negative.    Gastrointestinal: Negative.    Endocrine: Negative.    Genitourinary: Positive for flank pain and hematuria.   Skin: Negative.    Allergic/Immunologic: Negative.    Neurological: Negative.    Hematological: Negative.    Psychiatric/Behavioral: Negative.    All other systems reviewed and are negative.      Past Medical History:   Diagnosis Date   • Abdominal pain    • Abdominal swelling    • Hoyos's disease    • Bipolar 1 disorder (HCC)    • Brain tumor (benign) (HCC)    • Brain tumor (HCC)     R Frontal Lobe per pt   • Brain tumor (HCC) 2014   • Constipation    • DDD (degenerative disc disease), cervical 05/29/2017   • DDD (degenerative disc disease), cervical    • Diarrhea    • Fibromyalgia    • IBS (irritable bowel syndrome)    • Migraine    • Nausea & vomiting    • PONV (postoperative nausea and vomiting)    • PTSD (post-traumatic stress disorder)    • Rectal bleeding        Allergies   Allergen Reactions   • Ativan [Lorazepam] Hallucinations     confusion   • Sulfa Antibiotics Shortness Of Breath and Swelling   • Sulfa Antibiotics Anaphylaxis   • Reglan [Metoclopramide] Angioedema   • Compazine [Prochlorperazine Edisylate] Hives   • Demerol [Meperidine] Hives   • Droperidol Itching   • Metoclopramide Swelling   • Toradol [Ketorolac Tromethamine] Hives and Itching   • Toradol [Ketorolac Tromethamine] Hives   • Zofran [Ondansetron Hcl] Rash   • Zosyn [Piperacillin Sod-Tazobactam So] Hives       Past Surgical  History:   Procedure Laterality Date   • ANAL SCOPE N/A 2016    Procedure: ANAL SCOPE;  Surgeon: Kael Lopez MD;  Location: [facility] OR;  Service:    • APPENDECTOMY     • COLONOSCOPY N/A 2016    Procedure: COLONOSCOPY  CPTCODE:82992;  Surgeon: Jose Antonio Belle III, MD;  Location: [facility] OR;  Service:    • COLONOSCOPY N/A 2016    Procedure: COLONOSCOPY (54952) CPT;  Surgeon: Jose Antonio Belle III, MD;  Location: [facility] OR;  Service:    • CYSTOSCOPY RETROGRADE PYELOGRAM Bilateral 2017    Procedure: CYSTOSCOPY RETROGRADE PYELOGRAM;  Surgeon: Mu Blunt MD;  Location: [facility] OR;  Service:    • ENDOSCOPY N/A 2016    Procedure: ESOPHAGOGASTRODUODENOSCOPY WITH BIOPSY  CPTCODE:69458;  Surgeon: Jose Antonio Belle III, MD;  Location: [facility] OR;  Service:    • HEMORRHOIDECTOMY N/A 2016    Procedure: HEMORRHOID STAPLING;  Surgeon: Kael Lopez MD;  Location: [facility] OR;  Service:    • HYSTERECTOMY     • KNEE SURGERY     • LAPAROSCOPIC SALPINGOOPHERECTOMY     • PORTACATH PLACEMENT N/A 2017    Procedure: INSERTION OF PORTACATH;  Surgeon: Celso Arredondo MD;  Location: [facility] OR;  Service:    • SHOULDER SURGERY      3 times       Family History   Problem Relation Age of Onset   • Crohn's disease Other    • Hypertension Other    • Diabetes Other    • Irritable bowel syndrome Other    • No Known Problems Father    • No Known Problems Mother        Social History     Socioeconomic History   • Marital status:    Tobacco Use   • Smoking status: Former Smoker     Packs/day: 1.00     Years: 20.00     Pack years: 20.00     Quit date: 2015     Years since quittin.2   • Smokeless tobacco: Never Used   Substance and Sexual Activity   • Alcohol use: No   • Drug use: Yes     Types: Marijuana     Comment: for pain   • Sexual activity: Defer     Birth control/protection: Surgical           Objective   Physical Exam  Vitals and nursing note reviewed.   Constitutional:        Appearance: Normal appearance. She is normal weight.   HENT:      Head: Normocephalic and atraumatic.      Right Ear: External ear normal.      Left Ear: External ear normal.      Nose: Nose normal.      Mouth/Throat:      Mouth: Mucous membranes are moist.      Pharynx: Oropharynx is clear.   Eyes:      Extraocular Movements: Extraocular movements intact.      Conjunctiva/sclera: Conjunctivae normal.      Pupils: Pupils are equal, round, and reactive to light.   Cardiovascular:      Rate and Rhythm: Normal rate and regular rhythm.      Pulses: Normal pulses.      Heart sounds: Normal heart sounds.   Pulmonary:      Effort: Pulmonary effort is normal.      Breath sounds: Normal breath sounds.   Abdominal:      General: Abdomen is flat. Bowel sounds are normal.      Palpations: Abdomen is soft.   Musculoskeletal:         General: Normal range of motion.      Cervical back: Normal range of motion and neck supple.   Skin:     General: Skin is warm and dry.      Capillary Refill: Capillary refill takes less than 2 seconds.   Neurological:      General: No focal deficit present.      Mental Status: She is alert and oriented to person, place, and time. Mental status is at baseline.   Psychiatric:         Mood and Affect: Mood normal.         Behavior: Behavior normal.         Thought Content: Thought content normal.         Judgment: Judgment normal.         Procedures           ED Course  ED Course as of 01/14/22 1101   Fri Jan 14, 2022   1054 CT Abdomen Pelvis Stone Protocol  IMPRESSION:     1. No obstructive uropathy or urolithiasis     2.L2 compression fracture unchanged     3. Moderate to large stool burden              This report was finalized on 1/14/2022 10:44 AM by Dr. Luis Fernando Macdonald MD.             Specimen Collected: 01/14/22 10:43 Last Resulted: 01/14/22 10:44         [ML]   1057 RBC, UA(!): Too Numerous to Count  Pt chronically has hematuria.  Patient vitals stable and WNL. She has tolerated PO challenge  in the ED. Her ED stay has been uneventful. No vomiting. Patient instructed to f/u with Dr. Blunt and PCP. Discussed sx that would warrant return to the ED.  [ML]      ED Course User Index  [ML] Fatimah Ortiz PA                                                 MDM  Number of Diagnoses or Management Options     Amount and/or Complexity of Data Reviewed  Clinical lab tests: ordered and reviewed  Tests in the radiology section of CPT®: ordered and reviewed    Risk of Complications, Morbidity, and/or Mortality  Presenting problems: low  Diagnostic procedures: low  Management options: low  General comments: PT ED stay uneventful. Patient found to have constipation. Instructions to f/u with PCP and urologist.      Patient Progress  Patient progress: improved      Final diagnoses:   Constipation, unspecified constipation type       ED Disposition  ED Disposition     ED Disposition Condition Comment    Discharge Stable           YeseniaMabel, APRN  [address]  [place] [facility] [address]  [phone]    Schedule an appointment as soon as possible for a visit in 1 day      Mu Blunt MD  [address]  [place] [facility] [address]  [phone]    Schedule an appointment as soon as possible for a visit in 1 day           Medication List      New Prescriptions    magnesium citrate solution  Take 296 mL by mouth 1 (One) Time for 1 dose.        Changed    * docusate sodium 100 MG capsule  Commonly known as: COLACE  Take 1 capsule by mouth 2 (Two) Times a Day.  What changed: Another medication with the same name was added. Make sure you understand how and when to take each.     * docusate sodium 100 MG capsule  Commonly known as: COLACE  Take 1 capsule by mouth 2 (Two) Times a Day As Needed for Constipation.  What changed: You were already taking a medication with the same name, and this prescription was added. Make sure you understand how and when to take each.         * This list has 2  medication(s) that are the same as other medications prescribed for you. Read the directions carefully, and ask your doctor or other care provider to review them with you.               Where to Get Your Medications      These medications were sent to [facility] [place], [place] - [address] [place] [address] - [phone]  - [phone]   [address] [place] [id number]    Phone: [phone]   · docusate sodium 100 MG capsule  · magnesium citrate solution          Fatimah Ortiz PA  01/14/22 1032

## 2022-01-25 ENCOUNTER — OFFICE VISIT (OUTPATIENT)
Dept: UROLOGY | Facility: CLINIC | Age: 47
End: 2022-01-25

## 2022-01-25 VITALS — HEIGHT: 68 IN | BODY MASS INDEX: 19.1 KG/M2 | WEIGHT: 126 LBS

## 2022-01-25 DIAGNOSIS — N23 RENAL COLIC: ICD-10-CM

## 2022-01-25 DIAGNOSIS — N35.028 OTHER POST-TRAUMATIC URETHRAL STRICTURE, FEMALE: Primary | ICD-10-CM

## 2022-01-25 PROCEDURE — 96372 THER/PROPH/DIAG INJ SC/IM: CPT | Performed by: UROLOGY

## 2022-01-25 PROCEDURE — 53661 DILATION OF URETHRA: CPT | Performed by: UROLOGY

## 2022-01-25 PROCEDURE — 99213 OFFICE O/P EST LOW 20 MIN: CPT | Performed by: UROLOGY

## 2022-01-25 RX ORDER — OXYCODONE AND ACETAMINOPHEN 10; 325 MG/1; MG/1
1 TABLET ORAL EVERY 6 HOURS PRN
Qty: 10 TABLET | Refills: 0 | Status: SHIPPED | OUTPATIENT
Start: 2022-01-25 | End: 2022-02-22 | Stop reason: SDUPTHER

## 2022-01-25 RX ORDER — GENTAMICIN SULFATE 40 MG/ML
80 INJECTION, SOLUTION INTRAMUSCULAR; INTRAVENOUS ONCE
Status: COMPLETED | OUTPATIENT
Start: 2022-01-25 | End: 2022-01-25

## 2022-01-25 RX ADMIN — GENTAMICIN SULFATE 80 MG: 40 INJECTION, SOLUTION INTRAMUSCULAR; INTRAVENOUS at 10:47

## 2022-01-25 NOTE — PROGRESS NOTES
Chief Complaint:          Chief Complaint   Patient presents with   • dilation     4 week follow up        HPI:   47 y.o. female with severe urethral stenosis here for her monthly dilation.      Past Medical History:        Past Medical History:   Diagnosis Date   • Abdominal pain    • Abdominal swelling    • Hoyos's disease    • Bipolar 1 disorder (HCC)    • Brain tumor (benign) (HCC)    • Brain tumor (HCC)     R Frontal Lobe per pt   • Brain tumor (HCC) 2014   • Constipation    • DDD (degenerative disc disease), cervical 05/29/2017   • DDD (degenerative disc disease), cervical    • Diarrhea    • Fibromyalgia    • IBS (irritable bowel syndrome)    • Migraine    • Nausea & vomiting    • PONV (postoperative nausea and vomiting)    • PTSD (post-traumatic stress disorder)    • Rectal bleeding          Current Meds:     Current Outpatient Medications   Medication Sig Dispense Refill   • albuterol (PROVENTIL HFA;VENTOLIN HFA) 108 (90 Base) MCG/ACT inhaler Inhale 2 puffs 2 (Two) Times a Day.     • Azelastine HCl 137 MCG/SPRAY solution 1 spray into each nostril As Needed (Allergies).     • busPIRone (BUSPAR) 15 MG tablet Take 15 mg by mouth 3 (three) times a day        • cyclobenzaprine (FLEXERIL) 5 MG tablet      • diclofenac (VOLTAREN) 1 % gel gel      • divalproex (DEPAKOTE) 500 MG DR tablet Take 1 tablet by mouth 3 (Three) Times a Day. 90 tablet 2   • docusate sodium (COLACE) 100 MG capsule Take 1 capsule by mouth 2 (Two) Times a Day. 20 capsule 0   • docusate sodium (COLACE) 100 MG capsule Take 1 capsule by mouth 2 (Two) Times a Day As Needed for Constipation. 60 capsule 0   • fluticasone (VERAMYST) 27.5 MCG/SPRAY nasal spray 2 sprays into each nostril Daily.     • montelukast (SINGULAIR) 10 MG tablet Take 10 mg by mouth Every Night.     • oxyCODONE-acetaminophen (Percocet)  MG per tablet Take 1 tablet by mouth Every 6 (Six) Hours As Needed for Moderate Pain . 10 tablet 0   • oxyCODONE-acetaminophen (PERCOCET)  5-325 MG per tablet Take 1 tablet by mouth Every 8 (Eight) Hours As Needed for Moderate Pain . 4 tablet 0   • pantoprazole (PROTONIX) 40 MG EC tablet Take 40 mg by mouth 2 (Two) Times a Day As Needed.     • phenazopyridine (PYRIDIUM) 100 MG tablet      • polyethylene glycol (MIRALAX) 17 GM/SCOOP powder Take 17 g by mouth Daily. 225 g 0   • promethazine (PHENERGAN) 25 MG tablet Take 1 tablet by mouth Every 6 (Six) Hours As Needed for Nausea or Vomiting. 15 tablet 0   • sertraline (ZOLOFT) 100 MG tablet Take 100 mg by mouth 2 (Two) Times a Day.     • SUMAtriptan (IMITREX) 6 MG/0.5ML solution injection Inject 6 mg under the skin 1 (One) Time.     • tiZANidine (ZANAFLEX) 4 MG tablet      • traMADol (ULTRAM) 50 MG tablet        No current facility-administered medications for this visit.        Allergies:      Allergies   Allergen Reactions   • Ativan [Lorazepam] Hallucinations     confusion   • Sulfa Antibiotics Shortness Of Breath and Swelling   • Sulfa Antibiotics Anaphylaxis   • Reglan [Metoclopramide] Angioedema   • Compazine [Prochlorperazine Edisylate] Hives   • Demerol [Meperidine] Hives   • Droperidol Itching   • Metoclopramide Swelling   • Toradol [Ketorolac Tromethamine] Hives and Itching   • Toradol [Ketorolac Tromethamine] Hives   • Zofran [Ondansetron Hcl] Rash   • Zosyn [Piperacillin Sod-Tazobactam So] Hives        Past Surgical History:     Past Surgical History:   Procedure Laterality Date   • ANAL SCOPE N/A 7/28/2016    Procedure: ANAL SCOPE;  Surgeon: Kael Lopez MD;  Location: [facility] OR;  Service:    • APPENDECTOMY     • COLONOSCOPY N/A 6/30/2016    Procedure: COLONOSCOPY  CPTCODE:20404;  Surgeon: Jose Antonio Belle III, MD;  Location: [facility] OR;  Service:    • COLONOSCOPY N/A 7/7/2016    Procedure: COLONOSCOPY (64031) CPT;  Surgeon: Jose Antonio Belle III, MD;  Location: [facility] OR;  Service:    • CYSTOSCOPY RETROGRADE PYELOGRAM Bilateral 4/28/2017    Procedure: CYSTOSCOPY RETROGRADE PYELOGRAM;   Surgeon: Mu Blunt MD;  Location: [facility];  Service:    • ENDOSCOPY N/A 2016    Procedure: ESOPHAGOGASTRODUODENOSCOPY WITH BIOPSY  CPTCODE:10570;  Surgeon: Jose Antonio Belle III, MD;  Location: [facility] OR;  Service:    • HEMORRHOIDECTOMY N/A 2016    Procedure: HEMORRHOID STAPLING;  Surgeon: Kael Lopez MD;  Location: [facility] OR;  Service:    • HYSTERECTOMY     • KNEE SURGERY     • LAPAROSCOPIC SALPINGOOPHERECTOMY     • PORTACATH PLACEMENT N/A 2017    Procedure: INSERTION OF PORTACATH;  Surgeon: Celso Arredondo MD;  Location: [facility] OR;  Service:    • SHOULDER SURGERY      3 times         Social History:     Social History     Socioeconomic History   • Marital status:    Tobacco Use   • Smoking status: Former Smoker     Packs/day: 1.00     Years: 20.00     Pack years: 20.00     Quit date: 2015     Years since quittin.2   • Smokeless tobacco: Never Used   Vaping Use   • Vaping Use: Never used   Substance and Sexual Activity   • Alcohol use: No   • Drug use: Yes     Types: Marijuana     Comment: for pain   • Sexual activity: Defer     Birth control/protection: Surgical       Family History:     Family History   Problem Relation Age of Onset   • Crohn's disease Other    • Hypertension Other    • Diabetes Other    • Irritable bowel syndrome Other    • No Known Problems Father    • No Known Problems Mother        Review of Systems:     Review of Systems   Constitutional: Negative.  Negative for activity change, appetite change, chills, diaphoresis, fatigue and unexpected weight change.   HENT: Negative for congestion, dental problem, drooling, ear discharge, ear pain, facial swelling, hearing loss, mouth sores, nosebleeds, postnasal drip, rhinorrhea, sinus pressure, sneezing, sore throat, tinnitus, trouble swallowing and voice change.    Eyes: Negative.  Negative for photophobia, pain, discharge, redness, itching and visual disturbance.   Respiratory: Negative.   Negative for apnea, cough, choking, chest tightness, shortness of breath, wheezing and stridor.    Cardiovascular: Negative.  Negative for chest pain, palpitations and leg swelling.   Gastrointestinal: Negative.  Negative for abdominal distention, abdominal pain, anal bleeding, blood in stool, constipation, diarrhea, nausea, rectal pain and vomiting.   Endocrine: Negative.  Negative for cold intolerance, heat intolerance, polydipsia, polyphagia and polyuria.   Genitourinary: Positive for difficulty urinating.   Musculoskeletal: Negative.  Negative for arthralgias, back pain, gait problem, joint swelling, myalgias, neck pain and neck stiffness.   Skin: Negative.  Negative for color change, pallor, rash and wound.   Allergic/Immunologic: Negative.  Negative for environmental allergies, food allergies and immunocompromised state.   Neurological: Negative.  Negative for dizziness, tremors, seizures, syncope, facial asymmetry, speech difficulty, weakness, light-headedness, numbness and headaches.   Hematological: Negative.  Negative for adenopathy. Does not bruise/bleed easily.   Psychiatric/Behavioral: Negative for agitation, behavioral problems, confusion, decreased concentration, dysphoric mood, hallucinations, self-injury, sleep disturbance and suicidal ideas. The patient is not nervous/anxious and is not hyperactive.    All other systems reviewed and are negative.      Physical Exam:     Physical Exam  Constitutional:       Appearance: She is well-developed.   HENT:      Head: Normocephalic and atraumatic.      Right Ear: External ear normal.      Left Ear: External ear normal.   Eyes:      Conjunctiva/sclera: Conjunctivae normal.      Pupils: Pupils are equal, round, and reactive to light.   Cardiovascular:      Rate and Rhythm: Normal rate and regular rhythm.      Heart sounds: Normal heart sounds.   Pulmonary:      Effort: Pulmonary effort is normal.      Breath sounds: Normal breath sounds.   Abdominal:       General: Bowel sounds are normal. There is no distension.      Palpations: Abdomen is soft. There is no mass.      Tenderness: There is no abdominal tenderness. There is no guarding or rebound.   Genitourinary:     General: Normal vulva.      Vagina: No vaginal discharge.   Musculoskeletal:         General: Normal range of motion.   Skin:     General: Skin is warm and dry.   Neurological:      Mental Status: She is alert.      Deep Tendon Reflexes: Reflexes are normal and symmetric.   Psychiatric:         Behavior: Behavior normal.         Thought Content: Thought content normal.         Judgment: Judgment normal.         I have reviewed the following portions of the patient's history: Allergies, current medications, past family history, past medical history, past social history, past surgical history, problem list, and ROS and confirm it is accurate.      Procedure:   Urethral dilation-after an appropriate informed consent, the patient was brought to the procedure suite.  The urethra was gently anesthetized with 10 mL of 2% viscous Xylocaine jelly.  After an adequate period of topical anesthesia I went ahead and dilated with [place] sounds from 16 to 26 Greek sequentially without complication. The patient was given gentamicin as prophylaxis with 80 mg.  Assessment/Plan:   Urethral stricture post infective status post dilation  Narcotic pain medication-patient has significant acute pain that I believe would be an indication for the use of narcotic pain medication.  I discussed the significant risks of pain medication and the fact that this will be a short only option and I will give her no more than a three-day supply of pain medication, I will not plan long-term medication, and that this will be sent to a pain clinic if at all becomes necessary.  We discussed signing a pain medication agreement and the fact that we're going to run a state [facility] review to be sure the patient is not getting pain medication from  elsewhere.  If this is the case, we will not give pain medication as part of the patient's treatment plan of there being prescribed a controlled substance with potential for abuse.  This patient has been well aware of the appropriate dose of such medications including the risks for somnolence, limited ability to drive and/or safety and the significant potential for overdose.  It has been made clear that these medications are for the prescribed patient only without concomitant use of alcohol           This document has been electronically signed by VERENICE FINK MD January 25, 2022 10:38 EST

## 2022-02-12 ENCOUNTER — HOSPITAL ENCOUNTER (EMERGENCY)
Facility: HOSPITAL | Age: 47
Discharge: HOME | End: 2022-02-12
Attending: STUDENT IN AN ORGANIZED HEALTH CARE EDUCATION/TRAINING PROGRAM | Admitting: STUDENT IN AN ORGANIZED HEALTH CARE EDUCATION/TRAINING PROGRAM

## 2022-02-12 VITALS
HEIGHT: 68 IN | OXYGEN SATURATION: 99 % | SYSTOLIC BLOOD PRESSURE: 110 MMHG | WEIGHT: 120 LBS | BODY MASS INDEX: 18.19 KG/M2 | DIASTOLIC BLOOD PRESSURE: 74 MMHG | RESPIRATION RATE: 14 BRPM | HEART RATE: 80 BPM | TEMPERATURE: 98 F

## 2022-02-12 DIAGNOSIS — G43.909 MIGRAINE WITHOUT STATUS MIGRAINOSUS, NOT INTRACTABLE, UNSPECIFIED MIGRAINE TYPE: Primary | ICD-10-CM

## 2022-02-12 LAB
ANION GAP SERPL CALCULATED.3IONS-SCNC: 10.1 MMOL/L (ref 5–15)
BASOPHILS # BLD AUTO: 0.01 10*3/MM3 (ref 0–0.2)
BASOPHILS NFR BLD AUTO: 0.2 % (ref 0–1.5)
BUN SERPL-MCNC: 10 MG/DL (ref 6–20)
BUN/CREAT SERPL: 17.2 (ref 7–25)
CALCIUM SPEC-SCNC: 9.2 MG/DL (ref 8.6–10.5)
CHLORIDE SERPL-SCNC: 104 MMOL/L (ref 98–107)
CO2 SERPL-SCNC: 23.9 MMOL/L (ref 22–29)
CREAT SERPL-MCNC: 0.58 MG/DL (ref 0.57–1)
DEPRECATED RDW RBC AUTO: 46.1 FL (ref 37–54)
EOSINOPHIL # BLD AUTO: 0.11 10*3/MM3 (ref 0–0.4)
EOSINOPHIL NFR BLD AUTO: 2.3 % (ref 0.3–6.2)
ERYTHROCYTE [DISTWIDTH] IN BLOOD BY AUTOMATED COUNT: 13.6 % (ref 12.3–15.4)
GFR SERPL CREATININE-BSD FRML MDRD: 111 ML/MIN/1.73
GLUCOSE SERPL-MCNC: 91 MG/DL (ref 65–99)
HCT VFR BLD AUTO: 42.3 % (ref 34–46.6)
HGB BLD-MCNC: 13.9 G/DL (ref 12–15.9)
IMM GRANULOCYTES # BLD AUTO: 0.01 10*3/MM3 (ref 0–0.05)
IMM GRANULOCYTES NFR BLD AUTO: 0.2 % (ref 0–0.5)
LYMPHOCYTES # BLD AUTO: 1.9 10*3/MM3 (ref 0.7–3.1)
LYMPHOCYTES NFR BLD AUTO: 40.5 % (ref 19.6–45.3)
MCH RBC QN AUTO: 30 PG (ref 26.6–33)
MCHC RBC AUTO-ENTMCNC: 32.9 G/DL (ref 31.5–35.7)
MCV RBC AUTO: 91.4 FL (ref 79–97)
MONOCYTES # BLD AUTO: 0.33 10*3/MM3 (ref 0.1–0.9)
MONOCYTES NFR BLD AUTO: 7 % (ref 5–12)
NEUTROPHILS NFR BLD AUTO: 2.33 10*3/MM3 (ref 1.7–7)
NEUTROPHILS NFR BLD AUTO: 49.8 % (ref 42.7–76)
NRBC BLD AUTO-RTO: 0 /100 WBC (ref 0–0.2)
PLATELET # BLD AUTO: 146 10*3/MM3 (ref 140–450)
PMV BLD AUTO: 10.4 FL (ref 6–12)
POTASSIUM SERPL-SCNC: 4.1 MMOL/L (ref 3.5–5.2)
RBC # BLD AUTO: 4.63 10*6/MM3 (ref 3.77–5.28)
SODIUM SERPL-SCNC: 138 MMOL/L (ref 136–145)
WBC NRBC COR # BLD: 4.69 10*3/MM3 (ref 3.4–10.8)

## 2022-02-12 PROCEDURE — 85025 COMPLETE CBC W/AUTO DIFF WBC: CPT | Performed by: STUDENT IN AN ORGANIZED HEALTH CARE EDUCATION/TRAINING PROGRAM

## 2022-02-12 PROCEDURE — 96375 TX/PRO/DX INJ NEW DRUG ADDON: CPT

## 2022-02-12 PROCEDURE — 36415 COLL VENOUS BLD VENIPUNCTURE: CPT

## 2022-02-12 PROCEDURE — 25010000002 DEXAMETHASONE PER 1 MG: Performed by: STUDENT IN AN ORGANIZED HEALTH CARE EDUCATION/TRAINING PROGRAM

## 2022-02-12 PROCEDURE — 99283 EMERGENCY DEPT VISIT LOW MDM: CPT

## 2022-02-12 PROCEDURE — 25010000002 HEPARIN LOCK FLUSH PER 10 UNITS: Performed by: STUDENT IN AN ORGANIZED HEALTH CARE EDUCATION/TRAINING PROGRAM

## 2022-02-12 PROCEDURE — 96374 THER/PROPH/DIAG INJ IV PUSH: CPT

## 2022-02-12 PROCEDURE — 80048 BASIC METABOLIC PNL TOTAL CA: CPT | Performed by: STUDENT IN AN ORGANIZED HEALTH CARE EDUCATION/TRAINING PROGRAM

## 2022-02-12 PROCEDURE — 63710000001 PROMETHAZINE PER 25 MG: Performed by: STUDENT IN AN ORGANIZED HEALTH CARE EDUCATION/TRAINING PROGRAM

## 2022-02-12 PROCEDURE — 25010000002 DIPHENHYDRAMINE PER 50 MG: Performed by: STUDENT IN AN ORGANIZED HEALTH CARE EDUCATION/TRAINING PROGRAM

## 2022-02-12 RX ORDER — DEXAMETHASONE SODIUM PHOSPHATE 10 MG/ML
10 INJECTION INTRAMUSCULAR; INTRAVENOUS ONCE
Status: COMPLETED | OUTPATIENT
Start: 2022-02-12 | End: 2022-02-12

## 2022-02-12 RX ORDER — PROMETHAZINE HYDROCHLORIDE 25 MG/1
25 TABLET ORAL ONCE
Status: COMPLETED | OUTPATIENT
Start: 2022-02-12 | End: 2022-02-12

## 2022-02-12 RX ORDER — ACETAMINOPHEN 500 MG
500 TABLET ORAL ONCE
Status: COMPLETED | OUTPATIENT
Start: 2022-02-12 | End: 2022-02-12

## 2022-02-12 RX ORDER — HYDROCODONE BITARTRATE AND ACETAMINOPHEN 5; 325 MG/1; MG/1
1 TABLET ORAL ONCE
Status: COMPLETED | OUTPATIENT
Start: 2022-02-12 | End: 2022-02-12

## 2022-02-12 RX ORDER — HEPARIN SODIUM (PORCINE) LOCK FLUSH IV SOLN 100 UNIT/ML 100 UNIT/ML
300 SOLUTION INTRAVENOUS ONCE
Status: COMPLETED | OUTPATIENT
Start: 2022-02-12 | End: 2022-02-12

## 2022-02-12 RX ORDER — DIPHENHYDRAMINE HYDROCHLORIDE 50 MG/ML
50 INJECTION INTRAMUSCULAR; INTRAVENOUS ONCE
Status: COMPLETED | OUTPATIENT
Start: 2022-02-12 | End: 2022-02-12

## 2022-02-12 RX ADMIN — DEXAMETHASONE SODIUM PHOSPHATE 10 MG: 10 INJECTION INTRAMUSCULAR; INTRAVENOUS at 16:11

## 2022-02-12 RX ADMIN — ACETAMINOPHEN 500 MG: 500 TABLET ORAL at 16:20

## 2022-02-12 RX ADMIN — HYDROCODONE BITARTRATE AND ACETAMINOPHEN 1 TABLET: 5; 325 TABLET ORAL at 16:20

## 2022-02-12 RX ADMIN — PROMETHAZINE HYDROCHLORIDE 25 MG: 25 TABLET ORAL at 16:14

## 2022-02-12 RX ADMIN — DIPHENHYDRAMINE HYDROCHLORIDE 50 MG: 50 INJECTION INTRAMUSCULAR; INTRAVENOUS at 16:16

## 2022-02-12 RX ADMIN — Medication 300 UNITS: at 17:19

## 2022-02-12 RX ADMIN — SODIUM CHLORIDE 1000 ML: 9 INJECTION, SOLUTION INTRAVENOUS at 16:11

## 2022-02-12 NOTE — ED PROVIDER NOTES
[facility]  emergency department encounter        Pt Name: Susanna Camilo  MRN: [id number]  Birthdate 1975  Date of evaluation: 2/12/2022    Chief Complaint   Patient presents with   • Migraine             HISTORY OF PRESENT ILLNESS:   Susanna Camilo is a 47 y.o. female   PMH chronic recurrent migraines, meningioma, IBS, DDD, bipolar 1, fibromyalgia, Buerger's disease, PTSD, frequent urethral dilations and self catheterizations.    Patient presents for migraine.  Pain onset 5 days ago.  Right-sided behind her eye, throbbing, constant, similar to numerous prior migraines, exacerbated by standing up, not relieved by sumatriptan, Maxalt.  Associated photophobia.  Patient endorses nausea and multiple episodes of vomiting including while in the emergency department here.  She denies fever chills cough congestion runny nose chest pain shortness of breath abdominal pain diarrhea.  Reports chronic dysuria that is unchanged and she has no concerns about UTI.    Patient reports what typically works for her migraines is Decadron, Phenergan, Benadryl, and some kind of pain control medication.  She reports allergy to Compazine--hives.    No other exacerbating or alleviating factors other than as noted above.  Severity: Severe    PCP: Mabel Patterson APRN          REVIEW OF SYSTEMS:     Review of Systems   Constitutional: Negative for fever.   HENT: Negative for congestion and rhinorrhea.    Eyes: Positive for photophobia.   Respiratory: Negative for cough and shortness of breath.    Cardiovascular: Negative for chest pain.   Gastrointestinal: Positive for nausea and vomiting. Negative for abdominal pain and diarrhea.   Genitourinary: Positive for dysuria (Chronic).   Musculoskeletal: Negative for myalgias.   Skin: Negative for rash.   Neurological: Positive for headaches.   Psychiatric/Behavioral: Negative for confusion.       Review of systems otherwise as per HPI.          PREVIOUS HISTORY:         Past  Medical History:   Diagnosis Date   • Abdominal pain    • Abdominal swelling    • Hoyos's disease    • Bipolar 1 disorder (HCC)    • Brain tumor (benign) (HCC)    • Brain tumor (HCC)     R Frontal Lobe per pt   • Brain tumor (HCC) 2014   • Constipation    • DDD (degenerative disc disease), cervical 05/29/2017   • DDD (degenerative disc disease), cervical    • Diarrhea    • Fibromyalgia    • IBS (irritable bowel syndrome)    • Migraine    • Nausea & vomiting    • PONV (postoperative nausea and vomiting)    • PTSD (post-traumatic stress disorder)    • Rectal bleeding            Past Surgical History:   Procedure Laterality Date   • ANAL SCOPE N/A 7/28/2016    Procedure: ANAL SCOPE;  Surgeon: Kael Lopez MD;  Location: [facility] OR;  Service:    • APPENDECTOMY     • COLONOSCOPY N/A 6/30/2016    Procedure: COLONOSCOPY  CPTCODE:23598;  Surgeon: Jose Antonio Belle III, MD;  Location: [facility] OR;  Service:    • COLONOSCOPY N/A 7/7/2016    Procedure: COLONOSCOPY (67935) CPT;  Surgeon: Jose Antonio Belle III, MD;  Location: [facility] OR;  Service:    • CYSTOSCOPY RETROGRADE PYELOGRAM Bilateral 4/28/2017    Procedure: CYSTOSCOPY RETROGRADE PYELOGRAM;  Surgeon: Mu Blunt MD;  Location: [facility] OR;  Service:    • ENDOSCOPY N/A 6/30/2016    Procedure: ESOPHAGOGASTRODUODENOSCOPY WITH BIOPSY  CPTCODE:05268;  Surgeon: Jose Antonio Belle III, MD;  Location: [facility] OR;  Service:    • HEMORRHOIDECTOMY N/A 7/28/2016    Procedure: HEMORRHOID STAPLING;  Surgeon: Kael Lopez MD;  Location: [facility] OR;  Service:    • HYSTERECTOMY     • KNEE SURGERY     • LAPAROSCOPIC SALPINGOOPHERECTOMY     • PORTACATH PLACEMENT N/A 7/28/2017    Procedure: INSERTION OF PORTACATH;  Surgeon: Celso Arredondo MD;  Location: [facility] OR;  Service:    • SHOULDER SURGERY      3 times           Social History     Socioeconomic History   • Marital status:    Tobacco Use   • Smoking status: Former Smoker     Packs/day: 1.00     Years: 20.00      Pack years: 20.00     Quit date: 2015     Years since quittin.2   • Smokeless tobacco: Never Used   Vaping Use   • Vaping Use: Never used   Substance and Sexual Activity   • Alcohol use: No   • Drug use: Yes     Types: Marijuana     Comment: for pain   • Sexual activity: Defer     Birth control/protection: Surgical           Family History   Problem Relation Age of Onset   • Crohn's disease Other    • Hypertension Other    • Diabetes Other    • Irritable bowel syndrome Other    • No Known Problems Father    • No Known Problems Mother          Current Outpatient Medications   Medication Instructions   • albuterol (PROVENTIL HFA;VENTOLIN HFA) 108 (90 Base) MCG/ACT inhaler 2 puffs, Inhalation, 2 Times Daily   • Azelastine HCl 137 MCG/SPRAY solution 1 spray, Nasal, As Needed   • busPIRone (BUSPAR) 15 mg, Oral, 3 Times Daily   • cyclobenzaprine (FLEXERIL) 5 MG tablet No dose, route, or frequency recorded.   • diclofenac (VOLTAREN) 1 % gel gel No dose, route, or frequency recorded.   • divalproex (DEPAKOTE) 500 mg, Oral, 3 Times Daily   • docusate sodium (COLACE) 100 mg, Oral, 2 Times Daily   • docusate sodium (COLACE) 100 mg, Oral, 2 Times Daily PRN   • fluticasone (VERAMYST) 27.5 MCG/SPRAY nasal spray 2 sprays, Nasal, Daily   • montelukast (SINGULAIR) 10 mg, Oral, Nightly   • oxyCODONE-acetaminophen (Percocet)  MG per tablet 1 tablet, Oral, Every 6 Hours PRN   • oxyCODONE-acetaminophen (PERCOCET) 5-325 MG per tablet 1 tablet, Oral, Every 8 Hours PRN   • pantoprazole (PROTONIX) 40 mg, Oral, 2 Times Daily PRN   • phenazopyridine (PYRIDIUM) 100 MG tablet No dose, route, or frequency recorded.   • polyethylene glycol (MIRALAX) 17 g, Oral, Daily   • promethazine (PHENERGAN) 25 mg, Oral, Every 6 Hours PRN   • sertraline (ZOLOFT) 100 mg, Oral, 2 Times Daily   • SUMAtriptan (IMITREX) 6 mg, Subcutaneous, Once   • tiZANidine (ZANAFLEX) 4 MG tablet No dose, route, or frequency recorded.   • traMADol (ULTRAM) 50  MG tablet No dose, route, or frequency recorded.         Allergies:  Ativan [lorazepam], Sulfa antibiotics, Sulfa antibiotics, Reglan [metoclopramide], Compazine [prochlorperazine edisylate], Demerol [meperidine], Droperidol, Metoclopramide, Toradol [ketorolac tromethamine], Toradol [ketorolac tromethamine], Zofran [ondansetron hcl], and Zosyn [piperacillin sod-tazobactam so]    Medications, allergies and past medical, surgical, family, and social history reviewed.        PHYSICAL EXAM:     Physical Exam  Vitals and nursing note reviewed.   Constitutional:       General: She is not in acute distress.     Appearance: She is not ill-appearing or toxic-appearing.      Comments: Thin, nontoxic   HENT:      Head: Normocephalic and atraumatic.   Eyes:      Extraocular Movements: Extraocular movements intact.      Conjunctiva/sclera: Conjunctivae normal.   Cardiovascular:      Rate and Rhythm: Normal rate and regular rhythm.   Pulmonary:      Effort: Pulmonary effort is normal. No respiratory distress.      Comments: Right upper chest wall port  Abdominal:      General: Abdomen is flat. There is no distension.   Musculoskeletal:         General: No deformity. Normal range of motion.      Cervical back: Normal range of motion. No rigidity.   Neurological:      General: No focal deficit present.      Mental Status: She is alert and oriented to person, place, and time.   Psychiatric:         Mood and Affect: Mood normal.         Behavior: Behavior normal.             COMPLETED DIAGNOSTIC STUDIES AND INTERVENTIONS:     Lab Results (last 24 hours)     Procedure Component Value Units Date/Time    CBC & Differential [[id number]]  (Normal) Collected: 02/12/22 1610    Specimen: Blood Updated: 02/12/22 1620    Narrative:      The following orders were created for panel order CBC & Differential.  Procedure                               Abnormality         Status                     ---------                               -----------          ------                     CBC Auto Differential[[id number]]        Normal              Final result                 Please view results for these tests on the individual orders.    Basic Metabolic Panel [[id number]]  (Normal) Collected: 02/12/22 1610    Specimen: Blood from Arm, Right Updated: 02/12/22 1651     Glucose 91 mg/dL      BUN 10 mg/dL      Creatinine 0.58 mg/dL      Sodium 138 mmol/L      Potassium 4.1 mmol/L      Chloride 104 mmol/L      CO2 23.9 mmol/L      Calcium 9.2 mg/dL      eGFR Non African Amer 111 mL/min/1.73      BUN/Creatinine Ratio 17.2     Anion Gap 10.1 mmol/L     Narrative:      GFR Normal >60  Chronic Kidney Disease <60  Kidney Failure <15      CBC Auto Differential [[id number]]  (Normal) Collected: 02/12/22 1610    Specimen: Blood Updated: 02/12/22 1620     WBC 4.69 10*3/mm3      RBC 4.63 10*6/mm3      Hemoglobin 13.9 g/dL      Hematocrit 42.3 %      MCV 91.4 fL      MCH 30.0 pg      MCHC 32.9 g/dL      RDW 13.6 %      RDW-SD 46.1 fl      MPV 10.4 fL      Platelets 146 10*3/mm3      Neutrophil % 49.8 %      Lymphocyte % 40.5 %      Monocyte % 7.0 %      Eosinophil % 2.3 %      Basophil % 0.2 %      Immature Grans % 0.2 %      Neutrophils, Absolute 2.33 10*3/mm3      Lymphocytes, Absolute 1.90 10*3/mm3      Monocytes, Absolute 0.33 10*3/mm3      Eosinophils, Absolute 0.11 10*3/mm3      Basophils, Absolute 0.01 10*3/mm3      Immature Grans, Absolute 0.01 10*3/mm3      nRBC 0.0 /100 WBC             No orders to display         New Medications Ordered This Visit   Medications   • sodium chloride 0.9 % bolus 1,000 mL   • dexamethasone (DECADRON) injection 10 mg   • promethazine (PHENERGAN) tablet 25 mg   • diphenhydrAMINE (BENADRYL) injection 50 mg   • HYDROcodone-acetaminophen (NORCO) 5-325 MG per tablet 1 tablet   • acetaminophen (TYLENOL) tablet 500 mg   • heparin injection 300 Units         Procedures            MEDICAL DECISION-MAKING AND ED COURSE:     ED Course as of 02/12/22 0410    Sat Feb 12, 2022   1554 MDM: 47-year-old female with chronic migraines presents for headache of 5 days.  She has known meningioma, benign.  She reports this presentation is exactly like many others and she does not want work-up and specifically declines CT imaging of her brain.  She additionally has chronic dysuria and declines urinalysis.  We will treat with hydrocodone Tylenol Decadron Benadryl Phenergan and fluid bolus. [KP]   1644 WBC: 4.69 [KP]   1644 Hemoglobin: 13.9 [KP]   1644 Platelets: 146 [KP]   1652 Glucose: 91 [KP]   1652 Creatinine: 0.58 [KP]   1702 Patient reporting she is ready to go home.  Will allow fluids to finish and discharged with PCP follow-up, return for new onset or worsening symptoms. [KP]      ED Course User Index  [KP] Ankit Gleason MD       ?      FINAL IMPRESSION:       1. Migraine without status migrainosus, not intractable, unspecified migraine type         The complaints listed here are new problems to this examiner.      FOLLOW-UP  Mabel Patterson, APRN  [address] [facility] [address]  [phone]    Schedule an appointment as soon as possible for a visit         DISPOSITION  ED Disposition     ED Disposition Condition Comment    Discharge Stable                 This care is provided during an unprecedented national emergency due to the Novel Coronavirus (COVID-19). COVID-19 infections and transmission risks place heavy strains on healthcare resources. As this pandemic evolves, the Hospital and providers strive to respond fluidly, to remain operational, and to provide care relative to available resources and information. Outcomes are unpredictable and treatments are without well-defined guidelines. Further, the impact of COVID-19 on all aspects of emergency care, including the impact to patients seeking care for reasons other than COVID-19, is unavoidable during this national emergency.    This note was dictated using a [id number] tool. Occasional  wrong-word or 'sound-a-like' substitutions may have occurred due to the inherent limitations of voice recognition software. ?Read the chart carefully and recognize, using context, where substitutions have occurred.    Ankit Gleason MD  17:19 EST  2/12/2022             Ankit Gleason MD  02/12/22 171

## 2022-02-12 NOTE — ED NOTES
MEDICAL SCREENING:    Reason for Visit: migraine headache     Patient initially seen in triage.  The patient was advised further evaluation and diagnostic testing will be needed, some of the treatment and testing will be initiated in the lobby in order to begin the process.  The patient will be returned to the waiting area for the time being and possibly be re-assessed by a subsequent ED provider.  The patient will be brought back to the treatment area in as timely manner as possible.       Xenia Garza PA  02/12/22 0416

## 2022-02-12 NOTE — DISCHARGE INSTRUCTIONS
Please follow-up with your primary care provider in the next couple of days if not better, return here for new onset or worsening symptoms.

## 2022-02-16 ENCOUNTER — HOSPITAL ENCOUNTER (EMERGENCY)
Facility: HOSPITAL | Age: 47
Discharge: HOME | End: 2022-02-16
Attending: EMERGENCY MEDICINE | Admitting: EMERGENCY MEDICINE

## 2022-02-16 ENCOUNTER — APPOINTMENT (OUTPATIENT)
Dept: GENERAL RADIOLOGY | Facility: HOSPITAL | Age: 47
End: 2022-02-16

## 2022-02-16 ENCOUNTER — APPOINTMENT (OUTPATIENT)
Dept: CT IMAGING | Facility: HOSPITAL | Age: 47
End: 2022-02-16

## 2022-02-16 VITALS
HEIGHT: 68 IN | DIASTOLIC BLOOD PRESSURE: 92 MMHG | RESPIRATION RATE: 14 BRPM | HEART RATE: 107 BPM | TEMPERATURE: 98.8 F | BODY MASS INDEX: 18.19 KG/M2 | OXYGEN SATURATION: 97 % | WEIGHT: 120 LBS | SYSTOLIC BLOOD PRESSURE: 140 MMHG

## 2022-02-16 DIAGNOSIS — S46.912A STRAIN OF LEFT SHOULDER, INITIAL ENCOUNTER: ICD-10-CM

## 2022-02-16 DIAGNOSIS — S39.012A STRAIN OF LUMBAR REGION, INITIAL ENCOUNTER: Primary | ICD-10-CM

## 2022-02-16 PROCEDURE — 72131 CT LUMBAR SPINE W/O DYE: CPT

## 2022-02-16 PROCEDURE — 73030 X-RAY EXAM OF SHOULDER: CPT

## 2022-02-16 PROCEDURE — 71101 X-RAY EXAM UNILAT RIBS/CHEST: CPT

## 2022-02-16 PROCEDURE — 25010000002 MORPHINE PER 10 MG: Performed by: EMERGENCY MEDICINE

## 2022-02-16 PROCEDURE — 73060 X-RAY EXAM OF HUMERUS: CPT

## 2022-02-16 PROCEDURE — 72128 CT CHEST SPINE W/O DYE: CPT

## 2022-02-16 PROCEDURE — 96372 THER/PROPH/DIAG INJ SC/IM: CPT

## 2022-02-16 PROCEDURE — 99283 EMERGENCY DEPT VISIT LOW MDM: CPT

## 2022-02-16 RX ADMIN — MORPHINE SULFATE 4 MG: 4 INJECTION, SOLUTION INTRAMUSCULAR; INTRAVENOUS at 20:41

## 2022-02-16 NOTE — ED NOTES
MEDICAL SCREENING:    Reason for Visit: fall down stairs with pain in left shoulder, mid and low back.    Patient initially seen in triage.  The patient was advised further evaluation and diagnostic testing will be needed, some of the treatment and testing will be initiated in the lobby in order to begin the process.  The patient will be returned to the waiting area for the time being and possibly be re-assessed by a subsequent ED provider.  The patient will be brought back to the treatment area in as timely manner as possible.         Girish Morales, PA  02/16/22 1832

## 2022-02-17 NOTE — DISCHARGE INSTRUCTIONS
Please utilize rest, ice and ibuprofen for pain relief. Please follow up with your PCP in 2 days or return to ER if symptoms worsen.

## 2022-02-17 NOTE — ED PROVIDER NOTES
Subjective   This is a 47 year old female patient who presents to the ER with chief complaint of fall. The patient lost balance and fell down 4-5 steps yesterday injuring her left shoulder and upper arm, low back and mid back. Pain is located in those areas with no radiation. No numbness/tingling. No abrasions noted. No bowel/bladder dysfunction.           Review of Systems   Constitutional: Negative.  Negative for fever.   HENT: Negative.    Respiratory: Negative.    Cardiovascular: Negative.  Negative for chest pain.   Gastrointestinal: Negative.  Negative for abdominal pain.   Endocrine: Negative.    Genitourinary: Negative.  Negative for dysuria.   Musculoskeletal: Positive for back pain. Negative for arthralgias, gait problem, joint swelling, myalgias, neck pain and neck stiffness.        Left shoulder injury    Skin: Negative.    Neurological: Negative.    Psychiatric/Behavioral: Negative.    All other systems reviewed and are negative.      Past Medical History:   Diagnosis Date   • Abdominal pain    • Abdominal swelling    • Hoyos's disease    • Bipolar 1 disorder (HCC)    • Brain tumor (benign) (HCC)    • Brain tumor (HCC)     R Frontal Lobe per pt   • Brain tumor (HCC) 2014   • Constipation    • DDD (degenerative disc disease), cervical 05/29/2017   • DDD (degenerative disc disease), cervical    • Diarrhea    • Fibromyalgia    • IBS (irritable bowel syndrome)    • Migraine    • Nausea & vomiting    • PONV (postoperative nausea and vomiting)    • PTSD (post-traumatic stress disorder)    • Rectal bleeding        Allergies   Allergen Reactions   • Ativan [Lorazepam] Hallucinations     confusion   • Sulfa Antibiotics Shortness Of Breath and Swelling   • Sulfa Antibiotics Anaphylaxis   • Reglan [Metoclopramide] Angioedema   • Compazine [Prochlorperazine Edisylate] Hives   • Demerol [Meperidine] Hives   • Droperidol Itching   • Metoclopramide Swelling   • Toradol [Ketorolac Tromethamine] Hives and Itching   •  Toradol [Ketorolac Tromethamine] Hives   • Zofran [Ondansetron Hcl] Rash   • Zosyn [Piperacillin Sod-Tazobactam So] Hives       Past Surgical History:   Procedure Laterality Date   • ANAL SCOPE N/A 2016    Procedure: ANAL SCOPE;  Surgeon: Kael Lopez MD;  Location: [facility] OR;  Service:    • APPENDECTOMY     • COLONOSCOPY N/A 2016    Procedure: COLONOSCOPY  CPTCODE:34964;  Surgeon: Jose Antonio Belle III, MD;  Location: [facility] OR;  Service:    • COLONOSCOPY N/A 2016    Procedure: COLONOSCOPY (95835) CPT;  Surgeon: Jose Antonio Belle III, MD;  Location: [facility] OR;  Service:    • CYSTOSCOPY RETROGRADE PYELOGRAM Bilateral 2017    Procedure: CYSTOSCOPY RETROGRADE PYELOGRAM;  Surgeon: Mu Blunt MD;  Location: [facility] OR;  Service:    • ENDOSCOPY N/A 2016    Procedure: ESOPHAGOGASTRODUODENOSCOPY WITH BIOPSY  CPTCODE:28874;  Surgeon: Jose Antonio Belle III, MD;  Location: [facility] OR;  Service:    • HEMORRHOIDECTOMY N/A 2016    Procedure: HEMORRHOID STAPLING;  Surgeon: Kael Lopez MD;  Location: [facility] OR;  Service:    • HYSTERECTOMY     • KNEE SURGERY     • LAPAROSCOPIC SALPINGOOPHERECTOMY     • PORTACATH PLACEMENT N/A 2017    Procedure: INSERTION OF PORTACATH;  Surgeon: Celso Arredondo MD;  Location: [facility] OR;  Service:    • SHOULDER SURGERY      3 times       Family History   Problem Relation Age of Onset   • Crohn's disease Other    • Hypertension Other    • Diabetes Other    • Irritable bowel syndrome Other    • No Known Problems Father    • No Known Problems Mother        Social History     Socioeconomic History   • Marital status:    Tobacco Use   • Smoking status: Former Smoker     Packs/day: 1.00     Years: 20.00     Pack years: 20.00     Quit date: 2015     Years since quittin.2   • Smokeless tobacco: Never Used   Vaping Use   • Vaping Use: Never used   Substance and Sexual Activity   • Alcohol use: No   • Drug use: Yes     Types:  Marijuana     Comment: for pain   • Sexual activity: Defer     Birth control/protection: Surgical           Objective   Physical Exam  Vitals and nursing note reviewed.   Constitutional:       General: She is not in acute distress.     Appearance: She is well-developed. She is not diaphoretic.   HENT:      Head: Normocephalic and atraumatic.      Right Ear: External ear normal.      Left Ear: External ear normal.      Nose: Nose normal.   Eyes:      Conjunctiva/sclera: Conjunctivae normal.      Pupils: Pupils are equal, round, and reactive to light.   Neck:      Vascular: No JVD.      Trachea: No tracheal deviation.   Cardiovascular:      Rate and Rhythm: Normal rate and regular rhythm.      Heart sounds: Normal heart sounds. No murmur heard.      Pulmonary:      Effort: Pulmonary effort is normal. No respiratory distress.      Breath sounds: Normal breath sounds. No wheezing.   Abdominal:      General: Bowel sounds are normal.      Palpations: Abdomen is soft.      Tenderness: There is no abdominal tenderness.   Musculoskeletal:         General: Tenderness and signs of injury present. No swelling or deformity.      Cervical back: Normal range of motion and neck supple.      Comments: Skin about back intact with no bruising or abrasions noted. Tenderness to palpation noted in the paraspinal muscles of the Tspine and Lspine. Full ROM BLE intact. Neurovascular status and sensation BUE and BLE intact. Left shoulder skin intact with no bruising or abrasion. No edema. Tenderness to palpation and limited, painful ROM noted.    Skin:     General: Skin is warm and dry.      Coloration: Skin is not pale.      Findings: No erythema or rash.   Neurological:      Mental Status: She is alert and oriented to person, place, and time.      Cranial Nerves: No cranial nerve deficit.   Psychiatric:         Behavior: Behavior normal.         Thought Content: Thought content normal.         Splint - Cast - Strapping    Date/Time:  2/16/2022 8:43 PM  Performed by: Anya Fuentes PA  Authorized by: Alvin Marin MD     Consent:     Consent obtained:  Verbal    Consent given by:  Patient    Risks discussed:  Discoloration, numbness, pain and swelling    Alternatives discussed:  Referral, observation, alternative treatment, delayed treatment and no treatment  Pre-procedure details:     Sensation:  Normal    Skin color:  Normal   Procedure details:     Laterality:  Left    Location:  Shoulder    Shoulder:  L shoulder    Supplies:  Sling  Post-procedure details:     Pain:  Improved    Sensation:  Normal    Skin color:  Normal     Patient tolerance of procedure:  Tolerated well, no immediate complications               ED Course  ED Course as of 02/16/22 2148   Wed Feb 16, 2022 2023 CT Lumbar Spine Without Contrast  IMPRESSION:  No acute fracture or traumatic malalignment lumbar spine. There is mild chronic compression fracture at L2.     Signer Name: Melania Castillo MD   Signed: 2/16/2022 7:50 PM   Workstation Name: [facility]    [facility] []   2024 CT Thoracic Spine Without Contrast  IMPRESSION:  #1 there is a mild chronic compression deformity of T6. No acute fracture is suspected. No bony canal compromise or traumatic malalignment.  2. There is a 1 cm noncalcified soft tissue nodule in the right upper lobe. Follow-up chest CT recommended.     Signer Name: Melania Castillo MD   Signed: 2/16/2022 7:45 PM   Workstation Name: [facility]    [facility] []   2024 XR Ribs Left With PA Chest  IMPRESSION:     1. No displaced left rib fracture.  2. There is a soft tissue nodular density seen on a chest CT from November 2021 that should be further evaluated with a nonemergent chest CT. Likely underlying chronic lung disease. Otherwise no active disease is seen in the chest.  3. Mild anterior wedging L2. See separate CT lumbar spine report.     Signer Name: Melania Castillo MD   Signed: 2/16/2022 7:39 PM    Workstation Name: [facility]    [facility] [MM]   2024 XR Shoulder 2+ View Left  IMPRESSION:  No acute bony abnormality.        Signer Name: Jamila Emanuel MD   Signed: 2/16/2022 7:37 PM   Workstation Name: [facility]    [facility] [MM]   2024 XR Humerus Left  IMPRESSION:  No acute bony abnormality left humerus     Signer Name: Jamila Emanuel MD   Signed: 2/16/2022 7:36 PM   Workstation Name: [facility]    [facility] [MM]   2034 Patient diagnosed with low back strain, left shoulder strain. Will be d/c home in sling with instructions for conservative treatment and f/u with PCP in 2 days or return to ER if symptoms worsen.  [MM]      ED Course User Index  [MM] Anya Fuentes PA                                                 MDM  Number of Diagnoses or Management Options     Amount and/or Complexity of Data Reviewed  Tests in the radiology section of CPT®: ordered and reviewed  Discuss the patient with other providers: yes        Final diagnoses:   Strain of lumbar region, initial encounter   Strain of left shoulder, initial encounter       ED Disposition  ED Disposition     ED Disposition Condition Comment    Discharge Stable           Mabel Patterson, APRN  [address] [facility] [id number] [phone]    In 2 days           Medication List      No changes were made to your prescriptions during this visit.          Anya Fuentes PA  02/16/22 2039       Anya Fuentes PA  02/16/22 0714

## 2022-02-22 ENCOUNTER — OFFICE VISIT (OUTPATIENT)
Dept: UROLOGY | Facility: CLINIC | Age: 47
End: 2022-02-22

## 2022-02-22 VITALS — BODY MASS INDEX: 18.04 KG/M2 | HEIGHT: 68 IN | WEIGHT: 119 LBS

## 2022-02-22 DIAGNOSIS — N23 RENAL COLIC: ICD-10-CM

## 2022-02-22 DIAGNOSIS — N35.028 OTHER POST-TRAUMATIC URETHRAL STRICTURE, FEMALE: Primary | ICD-10-CM

## 2022-02-22 PROCEDURE — 96372 THER/PROPH/DIAG INJ SC/IM: CPT | Performed by: UROLOGY

## 2022-02-22 PROCEDURE — 53660 DILATION OF URETHRA: CPT | Performed by: UROLOGY

## 2022-02-22 RX ORDER — PROMETHAZINE HYDROCHLORIDE 25 MG/1
25 TABLET ORAL EVERY 6 HOURS PRN
Qty: 21 TABLET | Refills: 2 | Status: SHIPPED | OUTPATIENT
Start: 2022-02-22 | End: 2022-05-25 | Stop reason: SDUPTHER

## 2022-02-22 RX ORDER — OXYCODONE AND ACETAMINOPHEN 10; 325 MG/1; MG/1
1 TABLET ORAL EVERY 6 HOURS PRN
Qty: 14 TABLET | Refills: 0 | Status: SHIPPED | OUTPATIENT
Start: 2022-02-22 | End: 2022-03-22 | Stop reason: SDUPTHER

## 2022-02-22 RX ORDER — GENTAMICIN SULFATE 40 MG/ML
80 INJECTION, SOLUTION INTRAMUSCULAR; INTRAVENOUS ONCE
Status: COMPLETED | OUTPATIENT
Start: 2022-02-22 | End: 2022-02-22

## 2022-02-22 RX ADMIN — GENTAMICIN SULFATE 80 MG: 40 INJECTION, SOLUTION INTRAMUSCULAR; INTRAVENOUS at 09:57

## 2022-02-22 NOTE — PROGRESS NOTES
Chief Complaint:          Chief Complaint   Patient presents with   • Urethal Stricture     Dilation        HPI:   47 y.o. female returns today.  She has severe urethral stricture posttraumatic here for dilation.      Past Medical History:        Past Medical History:   Diagnosis Date   • Abdominal pain    • Abdominal swelling    • Hoyos's disease    • Bipolar 1 disorder (HCC)    • Brain tumor (benign) (HCC)    • Brain tumor (HCC)     R Frontal Lobe per pt   • Brain tumor (HCC) 2014   • Constipation    • DDD (degenerative disc disease), cervical 05/29/2017   • DDD (degenerative disc disease), cervical    • Diarrhea    • Fibromyalgia    • IBS (irritable bowel syndrome)    • Migraine    • Nausea & vomiting    • PONV (postoperative nausea and vomiting)    • PTSD (post-traumatic stress disorder)    • Rectal bleeding          Current Meds:     Current Outpatient Medications   Medication Sig Dispense Refill   • albuterol (PROVENTIL HFA;VENTOLIN HFA) 108 (90 Base) MCG/ACT inhaler Inhale 2 puffs 2 (Two) Times a Day.     • Azelastine HCl 137 MCG/SPRAY solution 1 spray into each nostril As Needed (Allergies).     • busPIRone (BUSPAR) 15 MG tablet Take 15 mg by mouth 3 (three) times a day        • cyclobenzaprine (FLEXERIL) 5 MG tablet      • diclofenac (VOLTAREN) 1 % gel gel      • divalproex (DEPAKOTE) 500 MG DR tablet Take 1 tablet by mouth 3 (Three) Times a Day. 90 tablet 2   • docusate sodium (COLACE) 100 MG capsule Take 1 capsule by mouth 2 (Two) Times a Day. 20 capsule 0   • docusate sodium (COLACE) 100 MG capsule Take 1 capsule by mouth 2 (Two) Times a Day As Needed for Constipation. 60 capsule 0   • fluticasone (VERAMYST) 27.5 MCG/SPRAY nasal spray 2 sprays into each nostril Daily.     • montelukast (SINGULAIR) 10 MG tablet Take 10 mg by mouth Every Night.     • oxyCODONE-acetaminophen (Percocet)  MG per tablet Take 1 tablet by mouth Every 6 (Six) Hours As Needed for Moderate Pain . 10 tablet 0   •  oxyCODONE-acetaminophen (PERCOCET) 5-325 MG per tablet Take 1 tablet by mouth Every 8 (Eight) Hours As Needed for Moderate Pain . 4 tablet 0   • pantoprazole (PROTONIX) 40 MG EC tablet Take 40 mg by mouth 2 (Two) Times a Day As Needed.     • phenazopyridine (PYRIDIUM) 100 MG tablet      • polyethylene glycol (MIRALAX) 17 GM/SCOOP powder Take 17 g by mouth Daily. 225 g 0   • promethazine (PHENERGAN) 25 MG tablet Take 1 tablet by mouth Every 6 (Six) Hours As Needed for Nausea or Vomiting. 15 tablet 0   • sertraline (ZOLOFT) 100 MG tablet Take 100 mg by mouth 2 (Two) Times a Day.     • SUMAtriptan (IMITREX) 6 MG/0.5ML solution injection Inject 6 mg under the skin 1 (One) Time.     • tiZANidine (ZANAFLEX) 4 MG tablet      • traMADol (ULTRAM) 50 MG tablet        No current facility-administered medications for this visit.        Allergies:      Allergies   Allergen Reactions   • Ativan [Lorazepam] Hallucinations     confusion   • Sulfa Antibiotics Shortness Of Breath and Swelling   • Sulfa Antibiotics Anaphylaxis   • Reglan [Metoclopramide] Angioedema   • Compazine [Prochlorperazine Edisylate] Hives   • Demerol [Meperidine] Hives   • Droperidol Itching   • Metoclopramide Swelling   • Toradol [Ketorolac Tromethamine] Hives and Itching   • Toradol [Ketorolac Tromethamine] Hives   • Zofran [Ondansetron Hcl] Rash   • Zosyn [Piperacillin Sod-Tazobactam So] Hives        Past Surgical History:     Past Surgical History:   Procedure Laterality Date   • ANAL SCOPE N/A 7/28/2016    Procedure: ANAL SCOPE;  Surgeon: Kael Lopez MD;  Location:  [facility] OR;  Service:    • APPENDECTOMY     • COLONOSCOPY N/A 6/30/2016    Procedure: COLONOSCOPY  CPTCODE:97115;  Surgeon: Jose Antonio Belle III, MD;  Location:  [facility] OR;  Service:    • COLONOSCOPY N/A 7/7/2016    Procedure: COLONOSCOPY (63113) CPT;  Surgeon: Jose Antonio Belle III, MD;  Location:  [facility] OR;  Service:    • CYSTOSCOPY RETROGRADE PYELOGRAM Bilateral 4/28/2017     Procedure: CYSTOSCOPY RETROGRADE PYELOGRAM;  Surgeon: Mu Blunt MD;  Location: [facility];  Service:    • ENDOSCOPY N/A 2016    Procedure: ESOPHAGOGASTRODUODENOSCOPY WITH BIOPSY  CPTCODE:53850;  Surgeon: Jose Antonio Belle III, MD;  Location: [facility];  Service:    • HEMORRHOIDECTOMY N/A 2016    Procedure: HEMORRHOID STAPLING;  Surgeon: Kael Lopez MD;  Location: [facility] OR;  Service:    • HYSTERECTOMY     • KNEE SURGERY     • LAPAROSCOPIC SALPINGOOPHERECTOMY     • PORTACATH PLACEMENT N/A 2017    Procedure: INSERTION OF PORTACATH;  Surgeon: Celso Arredondo MD;  Location: [facility] OR;  Service:    • SHOULDER SURGERY      3 times         Social History:     Social History     Socioeconomic History   • Marital status:    Tobacco Use   • Smoking status: Former Smoker     Packs/day: 1.00     Years: 20.00     Pack years: 20.00     Quit date: 2015     Years since quittin.3   • Smokeless tobacco: Never Used   Vaping Use   • Vaping Use: Never used   Substance and Sexual Activity   • Alcohol use: No   • Drug use: Yes     Types: Marijuana     Comment: for pain   • Sexual activity: Defer     Birth control/protection: Surgical       Family History:     Family History   Problem Relation Age of Onset   • Crohn's disease Other    • Hypertension Other    • Diabetes Other    • Irritable bowel syndrome Other    • No Known Problems Father    • No Known Problems Mother        Review of Systems:     Review of Systems   Constitutional: Negative.  Negative for activity change, appetite change, chills, diaphoresis, fatigue and unexpected weight change.   HENT: Negative for congestion, dental problem, drooling, ear discharge, ear pain, facial swelling, hearing loss, mouth sores, nosebleeds, postnasal drip, rhinorrhea, sinus pressure, sneezing, sore throat, tinnitus, trouble swallowing and voice change.    Eyes: Negative.  Negative for photophobia, pain, discharge, redness, itching and visual  disturbance.   Respiratory: Negative.  Negative for apnea, cough, choking, chest tightness, shortness of breath, wheezing and stridor.    Cardiovascular: Negative.  Negative for chest pain, palpitations and leg swelling.   Gastrointestinal: Negative.  Negative for abdominal distention, abdominal pain, anal bleeding, blood in stool, constipation, diarrhea, nausea, rectal pain and vomiting.   Endocrine: Negative.  Negative for cold intolerance, heat intolerance, polydipsia, polyphagia and polyuria.   Genitourinary: Positive for difficulty urinating.   Musculoskeletal: Negative.  Negative for arthralgias, back pain, gait problem, joint swelling, myalgias, neck pain and neck stiffness.   Skin: Negative.  Negative for color change, pallor, rash and wound.   Allergic/Immunologic: Negative.  Negative for environmental allergies, food allergies and immunocompromised state.   Neurological: Negative.  Negative for dizziness, tremors, seizures, syncope, facial asymmetry, speech difficulty, weakness, light-headedness, numbness and headaches.   Hematological: Negative.  Negative for adenopathy. Does not bruise/bleed easily.   Psychiatric/Behavioral: Negative for agitation, behavioral problems, confusion, decreased concentration, dysphoric mood, hallucinations, self-injury, sleep disturbance and suicidal ideas. The patient is not nervous/anxious and is not hyperactive.    All other systems reviewed and are negative.      Physical Exam:     Physical Exam  Constitutional:       Appearance: She is well-developed.   HENT:      Head: Normocephalic and atraumatic.      Right Ear: External ear normal.      Left Ear: External ear normal.   Eyes:      Conjunctiva/sclera: Conjunctivae normal.      Pupils: Pupils are equal, round, and reactive to light.   Cardiovascular:      Rate and Rhythm: Normal rate and regular rhythm.      Heart sounds: Normal heart sounds.   Pulmonary:      Effort: Pulmonary effort is normal.      Breath sounds:  Normal breath sounds.   Abdominal:      General: Bowel sounds are normal. There is no distension.      Palpations: Abdomen is soft. There is no mass.      Tenderness: There is no abdominal tenderness. There is no guarding or rebound.   Genitourinary:     General: Normal vulva.      Vagina: No vaginal discharge.   Musculoskeletal:         General: Normal range of motion.   Skin:     General: Skin is warm and dry.   Neurological:      Mental Status: She is alert.      Deep Tendon Reflexes: Reflexes are normal and symmetric.   Psychiatric:         Behavior: Behavior normal.         Thought Content: Thought content normal.         Judgment: Judgment normal.         I have reviewed the following portions of the patient's history: Allergies, current medications, past family history, past medical history, past social history, past surgical history, problem list, and ROS and confirm it is accurate.      Procedure:   Urethral dilation-after an appropriate informed consent, the patient was brought to the procedure suite.  The urethra was gently anesthetized with 10 mL of 2% viscous Xylocaine jelly.  After an adequate period of topical anesthesia I went ahead and adilated with [place] sounds from 16 to 26 [place] sequentially without complication. The patient was given gentamicin as prophylaxis with 80 mg.    Assessment/Plan:   Posttraumatic urethral stricture status post dilation  Narcotic pain medication-patient has significant acute pain that I believe would be an indication for the use of narcotic pain medication.  I discussed the significant risks of pain medication and the fact that this will be a short only option and I will give her no more than a three-day supply of pain medication, I will not plan long-term medication, and that this will be sent to a pain clinic if at all becomes necessary.  We discussed signing a pain medication agreement and the fact that we're going to run a state [facility] review to be sure the  patient is not getting pain medication from elsewhere.  If this is the case, we will not give pain medication as part of the patient's treatment plan of there being prescribed a controlled substance with potential for abuse.  This patient has been well aware of the appropriate dose of such medications including the risks for somnolence, limited ability to drive and/or safety and the significant potential for overdose.  It has been made clear that these medications are for the prescribed patient only without concomitant use of alcohol or other substance unless prescribed by the medical provider.  Has completed prescribing agreement detailing the terms of continue prescribing him a controlled substance including monitoring Luis Alberto reports, the possibility of urine drug screens, and pill counts.  The patient is aware that we review LUIS ALBERTO reports on a regular basis and scan them into the chart.  History and physical examination exhibited continued safe and appropriate use of controlled substances. We also discussed the fact that the new [place] legislation allows only a three-day prescription for pain medication.  In this situation he will be referred to a chronic pain clinic.                  This document has been electronically signed by VERENICE FINK MD February 22, 2022 09:38 EST

## 2022-02-24 ENCOUNTER — TELEPHONE (OUTPATIENT)
Dept: UROLOGY | Facility: CLINIC | Age: 47
End: 2022-02-24

## 2022-02-24 NOTE — TELEPHONE ENCOUNTER
The patient called and said that her catheter's aren't being delivered at home and she is almost out.

## 2022-02-25 NOTE — TELEPHONE ENCOUNTER
Patient called c/o really bad constipated and she has tried stool softeners, magnesium cit, miralax. She is scheduled 1/04/18 for colonoscopy but said last time she tried golytely prep, it didn't even help her clean out. I told her she may have to go to the er so they can check and see if she is impacted?    25-Feb-2022 12:03

## 2022-03-07 ENCOUNTER — APPOINTMENT (OUTPATIENT)
Dept: CT IMAGING | Facility: HOSPITAL | Age: 47
End: 2022-03-07

## 2022-03-07 ENCOUNTER — HOSPITAL ENCOUNTER (EMERGENCY)
Facility: HOSPITAL | Age: 47
Discharge: HOME | End: 2022-03-07
Attending: EMERGENCY MEDICINE | Admitting: EMERGENCY MEDICINE

## 2022-03-07 VITALS
OXYGEN SATURATION: 99 % | DIASTOLIC BLOOD PRESSURE: 78 MMHG | WEIGHT: 121 LBS | RESPIRATION RATE: 16 BRPM | HEART RATE: 72 BPM | HEIGHT: 68 IN | TEMPERATURE: 98.2 F | SYSTOLIC BLOOD PRESSURE: 140 MMHG | BODY MASS INDEX: 18.34 KG/M2

## 2022-03-07 DIAGNOSIS — R31.9 HEMATURIA, UNSPECIFIED TYPE: Primary | ICD-10-CM

## 2022-03-07 DIAGNOSIS — R10.31 ABDOMINAL PAIN, RLQ: ICD-10-CM

## 2022-03-07 LAB
ALBUMIN SERPL-MCNC: 4.03 G/DL (ref 3.5–5.2)
ALBUMIN/GLOB SERPL: 1.6 G/DL
ALP SERPL-CCNC: 69 U/L (ref 39–117)
ALT SERPL W P-5'-P-CCNC: 35 U/L (ref 1–33)
ANION GAP SERPL CALCULATED.3IONS-SCNC: 9 MMOL/L (ref 5–15)
APTT PPP: >100 SECONDS (ref 25.5–35.4)
AST SERPL-CCNC: 31 U/L (ref 1–32)
BACTERIA UR QL AUTO: ABNORMAL /HPF
BASOPHILS # BLD AUTO: 0.02 10*3/MM3 (ref 0–0.2)
BASOPHILS NFR BLD AUTO: 0.3 % (ref 0–1.5)
BILIRUB SERPL-MCNC: 0.3 MG/DL (ref 0–1.2)
BILIRUB UR QL STRIP: NEGATIVE
BUN SERPL-MCNC: 7 MG/DL (ref 6–20)
BUN/CREAT SERPL: 14 (ref 7–25)
CALCIUM SPEC-SCNC: 9 MG/DL (ref 8.6–10.5)
CHLORIDE SERPL-SCNC: 105 MMOL/L (ref 98–107)
CLARITY UR: ABNORMAL
CO2 SERPL-SCNC: 24 MMOL/L (ref 22–29)
COLOR UR: ABNORMAL
CREAT SERPL-MCNC: 0.5 MG/DL (ref 0.57–1)
DEPRECATED RDW RBC AUTO: 45.1 FL (ref 37–54)
EGFRCR SERPLBLD CKD-EPI 2021: 116.6 ML/MIN/1.73
EOSINOPHIL # BLD AUTO: 0.17 10*3/MM3 (ref 0–0.4)
EOSINOPHIL NFR BLD AUTO: 3 % (ref 0.3–6.2)
ERYTHROCYTE [DISTWIDTH] IN BLOOD BY AUTOMATED COUNT: 13.2 % (ref 12.3–15.4)
GLOBULIN UR ELPH-MCNC: 2.5 GM/DL
GLUCOSE SERPL-MCNC: 100 MG/DL (ref 65–99)
GLUCOSE UR STRIP-MCNC: NEGATIVE MG/DL
HCT VFR BLD AUTO: 42.9 % (ref 34–46.6)
HGB BLD-MCNC: 14.3 G/DL (ref 12–15.9)
HGB UR QL STRIP.AUTO: ABNORMAL
HYALINE CASTS UR QL AUTO: ABNORMAL /LPF
IMM GRANULOCYTES # BLD AUTO: 0.01 10*3/MM3 (ref 0–0.05)
IMM GRANULOCYTES NFR BLD AUTO: 0.2 % (ref 0–0.5)
INR PPP: 0.95 (ref 0.9–1.1)
KETONES UR QL STRIP: NEGATIVE
LEUKOCYTE ESTERASE UR QL STRIP.AUTO: ABNORMAL
LYMPHOCYTES # BLD AUTO: 2.21 10*3/MM3 (ref 0.7–3.1)
LYMPHOCYTES NFR BLD AUTO: 38.4 % (ref 19.6–45.3)
MCH RBC QN AUTO: 30.4 PG (ref 26.6–33)
MCHC RBC AUTO-ENTMCNC: 33.3 G/DL (ref 31.5–35.7)
MCV RBC AUTO: 91.3 FL (ref 79–97)
MONOCYTES # BLD AUTO: 0.38 10*3/MM3 (ref 0.1–0.9)
MONOCYTES NFR BLD AUTO: 6.6 % (ref 5–12)
NEUTROPHILS NFR BLD AUTO: 2.97 10*3/MM3 (ref 1.7–7)
NEUTROPHILS NFR BLD AUTO: 51.5 % (ref 42.7–76)
NITRITE UR QL STRIP: NEGATIVE
NRBC BLD AUTO-RTO: 0 /100 WBC (ref 0–0.2)
PH UR STRIP.AUTO: 5.5 [PH] (ref 5–8)
PLATELET # BLD AUTO: 152 10*3/MM3 (ref 140–450)
PMV BLD AUTO: 10.5 FL (ref 6–12)
POTASSIUM SERPL-SCNC: 3.7 MMOL/L (ref 3.5–5.2)
PROT SERPL-MCNC: 6.5 G/DL (ref 6–8.5)
PROT UR QL STRIP: NEGATIVE
PROTHROMBIN TIME: 13.1 SECONDS (ref 12.8–14.5)
RBC # BLD AUTO: 4.7 10*6/MM3 (ref 3.77–5.28)
RBC # UR STRIP: ABNORMAL /HPF
REF LAB TEST METHOD: ABNORMAL
SODIUM SERPL-SCNC: 138 MMOL/L (ref 136–145)
SP GR UR STRIP: 1.01 (ref 1–1.03)
SQUAMOUS #/AREA URNS HPF: ABNORMAL /HPF
UROBILINOGEN UR QL STRIP: ABNORMAL
WBC # UR STRIP: ABNORMAL /HPF
WBC NRBC COR # BLD: 5.76 10*3/MM3 (ref 3.4–10.8)

## 2022-03-07 PROCEDURE — 80053 COMPREHEN METABOLIC PANEL: CPT | Performed by: EMERGENCY MEDICINE

## 2022-03-07 PROCEDURE — 74176 CT ABD & PELVIS W/O CONTRAST: CPT

## 2022-03-07 PROCEDURE — 99283 EMERGENCY DEPT VISIT LOW MDM: CPT

## 2022-03-07 PROCEDURE — 96376 TX/PRO/DX INJ SAME DRUG ADON: CPT

## 2022-03-07 PROCEDURE — 63710000001 PROMETHAZINE PER 25 MG: Performed by: EMERGENCY MEDICINE

## 2022-03-07 PROCEDURE — 85730 THROMBOPLASTIN TIME PARTIAL: CPT | Performed by: EMERGENCY MEDICINE

## 2022-03-07 PROCEDURE — 96374 THER/PROPH/DIAG INJ IV PUSH: CPT

## 2022-03-07 PROCEDURE — 36415 COLL VENOUS BLD VENIPUNCTURE: CPT

## 2022-03-07 PROCEDURE — 25010000002 MORPHINE PER 10 MG: Performed by: EMERGENCY MEDICINE

## 2022-03-07 PROCEDURE — 85610 PROTHROMBIN TIME: CPT | Performed by: EMERGENCY MEDICINE

## 2022-03-07 PROCEDURE — 85025 COMPLETE CBC W/AUTO DIFF WBC: CPT | Performed by: EMERGENCY MEDICINE

## 2022-03-07 PROCEDURE — 81001 URINALYSIS AUTO W/SCOPE: CPT | Performed by: EMERGENCY MEDICINE

## 2022-03-07 RX ORDER — ONDANSETRON 4 MG/1
4 TABLET, ORALLY DISINTEGRATING ORAL ONCE
Status: CANCELLED | OUTPATIENT
Start: 2022-03-07 | End: 2022-03-07

## 2022-03-07 RX ORDER — SODIUM CHLORIDE 0.9 % (FLUSH) 0.9 %
10 SYRINGE (ML) INJECTION AS NEEDED
Status: DISCONTINUED | OUTPATIENT
Start: 2022-03-07 | End: 2022-03-08 | Stop reason: HOSPADM

## 2022-03-07 RX ORDER — PROMETHAZINE HYDROCHLORIDE 25 MG/1
25 TABLET ORAL ONCE
Status: COMPLETED | OUTPATIENT
Start: 2022-03-07 | End: 2022-03-07

## 2022-03-07 RX ADMIN — SODIUM CHLORIDE 1000 ML: 9 INJECTION, SOLUTION INTRAVENOUS at 20:50

## 2022-03-07 RX ADMIN — MORPHINE SULFATE 4 MG: 4 INJECTION, SOLUTION INTRAMUSCULAR; INTRAVENOUS at 21:03

## 2022-03-07 RX ADMIN — MORPHINE SULFATE 4 MG: 4 INJECTION INTRAVENOUS at 22:01

## 2022-03-07 RX ADMIN — PROMETHAZINE HYDROCHLORIDE 25 MG: 25 TABLET ORAL at 21:44

## 2022-03-08 ENCOUNTER — TELEPHONE (OUTPATIENT)
Dept: UROLOGY | Facility: CLINIC | Age: 47
End: 2022-03-08

## 2022-03-08 NOTE — ED NOTES
Per Dr. Marin PTT elevated. States to not give Heparin when d/c-ing port access.      Lili Delong, RN  03/07/22 [id number]

## 2022-03-08 NOTE — EXTERNAL PATIENT INSTRUCTIONS
Patient Education   Table of Contents       Hematuria, Adult     To view videos and all your education online visit,   [email or web address]   or scan this QR code with your smartphone.                  Hematuria, Adult         Hematuria is blood in the urine. Blood may be visible in the urine, or it may be identified with a test. This condition can be caused by infections of the bladder, urethra, kidney, or prostate. Other possible causes include:       Kidney stones.       Cancer of the urinary tract.       Too much calcium in the urine.       Conditions that are passed from parent to child (inherited conditions).       Exercise that requires a lot of energy.     Infections can usually be treated with medicine, and a kidney stone usually will pass through your urine. If neither of these is the cause of your hematuria, more tests may be needed to identify the cause of your symptoms.   It is very important to tell your health care provider about any blood in your urine, even if it is painless or the blood stops without treatment. Blood in the urine, when it happens and then stops and then happens again, can be a symptom of a very serious condition, including cancer. There is no pain in the initial stages of many urinary cancers.     Follow these instructions at home:   Medicines         Take over-the-counter and prescription medicines only as told by your health care provider.       If you were prescribed an antibiotic medicine, take it as told by your health care provider. Do not  stop taking the antibiotic even if you start to feel better.     Eating and drinking         Drink enough fluid to keep your urine pale yellow. It is recommended that you drink 3?4 quarts (2.8?3.8 L) a day. If you have been diagnosed with an infection, drinking cranberry juice in addition to large amounts of water is recommended.       Avoid caffeine, tea, and carbonated beverages. These tend to irritate the bladder.       Avoid  alcohol because it may irritate the prostate (in males).     General instructions         If you have been diagnosed with a kidney stone, follow your health care provider's instructions about straining your urine to catch the stone.       Empty your bladder often. Avoid holding urine for long periods of time.      If you are female:       After a bowel movement, wipe from front to back and use each piece of toilet paper only once.       Empty your bladder before and after sex.       Pay attention to any changes in your symptoms. Tell your health care provider about any changes or any new symptoms.       It is up to you to get the results of any tests. Ask your health care provider, or the department that is doing the test, when your results will be ready.       Keep all follow-up visits. This is important.     Contact a health care provider if:         You develop back pain.       You have a fever or chills.       You have nausea or vomiting.       Your symptoms do not improve after 3 days.       Your symptoms get worse.     Get help right away if:         You develop severe vomiting and are unable to take medicine without vomiting.       You develop severe pain in your back or abdomen even though you are taking medicine.       You pass a large amount of blood in your urine.       You pass blood clots in your urine.       You feel very weak or like you might faint.       You faint.     Summary         Hematuria is blood in the urine. It has many possible causes.       It is very important that you tell your health care provider about any blood in your urine, even if it is painless or the blood stops without treatment.       Take over-the-counter and prescription medicines only as told by your health care provider.       Drink enough fluid to keep your urine pale yellow.     This information is not intended to replace advice given to you by your health care provider. Make sure you discuss any questions you have with  your health care provider.     Document Released: 12/18/2006Document Revised: 08/18/2021Document Reviewed: 08/18/2021     Elsevier Patient Education ? 2021 [employer]vier Inc[employer]

## 2022-03-08 NOTE — ED NOTES
Patient asking for pain medication. Provider aware. Dr. cutler at  to discuss POC.      Rohan Field, RN  03/07/22 1633

## 2022-03-08 NOTE — ED PROVIDER NOTES
Subjective   47-year-old female past medical history of Buerger's disease, bipolar, kidney stones, coming in with right flank to right inguinal pain that began yesterday began to present progressively worsening.  Due to her history of Hoyos  Disease she has to straight cath at home daily.  States that today she noticed more pain more burning and blood in the urine which prompted her to come to the ER.          Review of Systems   Constitutional: Negative for chills and fever.   Respiratory: Negative for shortness of breath.    Cardiovascular: Negative for chest pain.   Gastrointestinal: Positive for abdominal pain. Negative for nausea and vomiting.   Genitourinary: Positive for dysuria.   Musculoskeletal: Negative for myalgias and neck stiffness.   Skin: Negative for rash.   Neurological: Negative for headaches.   All other systems reviewed and are negative.      Past Medical History:   Diagnosis Date   • Abdominal pain    • Abdominal swelling    • Hoyos's disease    • Bipolar 1 disorder (HCC)    • Brain tumor (benign) (HCC)    • Brain tumor (HCC)     R Frontal Lobe per pt   • Brain tumor (HCC) 2014   • Constipation    • DDD (degenerative disc disease), cervical 05/29/2017   • DDD (degenerative disc disease), cervical    • Diarrhea    • Fibromyalgia    • IBS (irritable bowel syndrome)    • Migraine    • Nausea & vomiting    • PONV (postoperative nausea and vomiting)    • PTSD (post-traumatic stress disorder)    • Rectal bleeding        Allergies   Allergen Reactions   • Ativan [Lorazepam] Hallucinations     confusion   • Sulfa Antibiotics Shortness Of Breath and Swelling   • Sulfa Antibiotics Anaphylaxis   • Reglan [Metoclopramide] Angioedema   • Compazine [Prochlorperazine Edisylate] Hives   • Demerol [Meperidine] Hives   • Droperidol Itching   • Metoclopramide Swelling   • Toradol [Ketorolac Tromethamine] Hives and Itching   • Toradol [Ketorolac Tromethamine] Hives   • Zofran [Ondansetron Hcl] Rash   • Zosyn  [Piperacillin Sod-Tazobactam So] Hives       Past Surgical History:   Procedure Laterality Date   • ANAL SCOPE N/A 2016    Procedure: ANAL SCOPE;  Surgeon: Kael Lopez MD;  Location: [facility] OR;  Service:    • APPENDECTOMY     • COLONOSCOPY N/A 2016    Procedure: COLONOSCOPY  CPTCODE:94027;  Surgeon: Jose Antonio Belle III, MD;  Location: [facility] OR;  Service:    • COLONOSCOPY N/A 2016    Procedure: COLONOSCOPY (92413) CPT;  Surgeon: Jose Antonio Belle III, MD;  Location: [facility] OR;  Service:    • CYSTOSCOPY RETROGRADE PYELOGRAM Bilateral 2017    Procedure: CYSTOSCOPY RETROGRADE PYELOGRAM;  Surgeon: Mu Blunt MD;  Location: [facility] OR;  Service:    • ENDOSCOPY N/A 2016    Procedure: ESOPHAGOGASTRODUODENOSCOPY WITH BIOPSY  CPTCODE:88924;  Surgeon: Jose Antonio Belle III, MD;  Location: [facility] OR;  Service:    • HEMORRHOIDECTOMY N/A 2016    Procedure: HEMORRHOID STAPLING;  Surgeon: Kael Lopez MD;  Location: [facility] OR;  Service:    • HYSTERECTOMY     • KNEE SURGERY     • LAPAROSCOPIC SALPINGOOPHERECTOMY     • PORTACATH PLACEMENT N/A 2017    Procedure: INSERTION OF PORTACATH;  Surgeon: Celso Arredondo MD;  Location: [facility];  Service:    • SHOULDER SURGERY      3 times       Family History   Problem Relation Age of Onset   • Crohn's disease Other    • Hypertension Other    • Diabetes Other    • Irritable bowel syndrome Other    • No Known Problems Father    • No Known Problems Mother        Social History     Socioeconomic History   • Marital status:    Tobacco Use   • Smoking status: Former Smoker     Packs/day: 1.00     Years: 20.00     Pack years: 20.00     Quit date: 2015     Years since quittin.3   • Smokeless tobacco: Never Used   Vaping Use   • Vaping Use: Never used   Substance and Sexual Activity   • Alcohol use: No   • Drug use: Yes     Types: Marijuana     Comment: for pain   • Sexual activity: Defer     Birth control/protection:  Surgical           Objective   Physical Exam  Vitals and nursing note reviewed.   Constitutional:       General: She is not in acute distress.  HENT:      Head: Normocephalic and atraumatic.      Mouth/Throat:      Mouth: Mucous membranes are moist.   Eyes:      Extraocular Movements: Extraocular movements intact.      Pupils: Pupils are equal, round, and reactive to light.   Cardiovascular:      Rate and Rhythm: Normal rate and regular rhythm.      Heart sounds: Normal heart sounds. No murmur heard.    No friction rub. No gallop.   Pulmonary:      Effort: Pulmonary effort is normal.      Breath sounds: Normal breath sounds. No wheezing, rhonchi or rales.   Chest:      Chest wall: No tenderness.   Abdominal:      General: Abdomen is flat. Bowel sounds are normal. There is no distension.      Palpations: Abdomen is soft. There is no mass.      Tenderness: There is no abdominal tenderness. There is right CVA tenderness. There is no left CVA tenderness, guarding or rebound.      Hernia: No hernia is present.   Skin:     General: Skin is warm and dry.      Capillary Refill: Capillary refill takes less than 2 seconds.      Findings: No rash.   Neurological:      General: No focal deficit present.      Mental Status: She is alert and oriented to person, place, and time.         Procedures           ED Course  ED Course as of 03/08/22 0410   Mon Mar 07, 2022   2140 Imaging negtive UA + for blooed   [JM]      ED Course User Index  [] Alvin Marin MD                                                 MDM  Number of Diagnoses or Management Options  Abdominal pain, RLQ  Hematuria, unspecified type  Diagnosis management comments: Patient feeling better after medications, discussed all results, she would like to go home.  Discussed not taking blood thinners for a couple days like aspirin ibuprofen because of her hematuria.  She will follow-up with her urologist tomorrow.  She will return if anything worsens.       Amount  and/or Complexity of Data Reviewed  Clinical lab tests: reviewed  Tests in the radiology section of CPT®: reviewed    Patient Progress  Patient progress: improved      Final diagnoses:   Hematuria, unspecified type   Abdominal pain, RLQ       ED Disposition  ED Disposition     ED Disposition   Discharge    Condition   Stable    Comment   --             your urologist    Schedule an appointment as soon as possible for a visit   tomorrow    [facility] Emergency Department  [address] [place] [id number]  [phone]  Go to   If symptoms worsen         Medication List      No changes were made to your prescriptions during this visit.          Alvin Marin MD  03/08/22 0410

## 2022-03-09 NOTE — TELEPHONE ENCOUNTER
I called the patient to let her know I have reached out to the rep and the rep told me she was trying to reach Susanna to verify her address because they had mailed them out. I left a voicemail with Steph the catheter reps phone number which is [phone].

## 2022-03-22 ENCOUNTER — OFFICE VISIT (OUTPATIENT)
Dept: UROLOGY | Facility: CLINIC | Age: 47
End: 2022-03-22

## 2022-03-22 VITALS — HEIGHT: 68 IN | WEIGHT: 121 LBS | BODY MASS INDEX: 18.34 KG/M2

## 2022-03-22 DIAGNOSIS — Z79.2 PROPHYLACTIC ANTIBIOTIC: Primary | ICD-10-CM

## 2022-03-22 DIAGNOSIS — N35.028 OTHER POST-TRAUMATIC URETHRAL STRICTURE, FEMALE: ICD-10-CM

## 2022-03-22 DIAGNOSIS — N23 RENAL COLIC: ICD-10-CM

## 2022-03-22 PROCEDURE — 53661 DILATION OF URETHRA: CPT | Performed by: UROLOGY

## 2022-03-22 PROCEDURE — 96372 THER/PROPH/DIAG INJ SC/IM: CPT | Performed by: UROLOGY

## 2022-03-22 RX ORDER — GENTAMICIN SULFATE 40 MG/ML
80 INJECTION, SOLUTION INTRAMUSCULAR; INTRAVENOUS ONCE
Status: COMPLETED | OUTPATIENT
Start: 2022-03-22 | End: 2022-03-22

## 2022-03-22 RX ORDER — OXYCODONE AND ACETAMINOPHEN 10; 325 MG/1; MG/1
1 TABLET ORAL EVERY 6 HOURS PRN
Qty: 14 TABLET | Refills: 0 | Status: SHIPPED | OUTPATIENT
Start: 2022-03-22 | End: 2022-04-19 | Stop reason: SDUPTHER

## 2022-03-22 RX ADMIN — GENTAMICIN SULFATE 80 MG: 40 INJECTION, SOLUTION INTRAMUSCULAR; INTRAVENOUS at 13:45

## 2022-03-23 ENCOUNTER — APPOINTMENT (OUTPATIENT)
Dept: CT IMAGING | Facility: HOSPITAL | Age: 47
End: 2022-03-23

## 2022-03-29 ENCOUNTER — HOSPITAL ENCOUNTER (EMERGENCY)
Facility: HOSPITAL | Age: 47
Discharge: HOME | End: 2022-03-29
Attending: EMERGENCY MEDICINE | Admitting: EMERGENCY MEDICINE

## 2022-03-29 ENCOUNTER — APPOINTMENT (OUTPATIENT)
Dept: CT IMAGING | Facility: HOSPITAL | Age: 47
End: 2022-03-29

## 2022-03-29 VITALS
BODY MASS INDEX: 18.19 KG/M2 | SYSTOLIC BLOOD PRESSURE: 124 MMHG | OXYGEN SATURATION: 99 % | HEIGHT: 68 IN | RESPIRATION RATE: 18 BRPM | HEART RATE: 115 BPM | TEMPERATURE: 97.8 F | WEIGHT: 120 LBS | DIASTOLIC BLOOD PRESSURE: 75 MMHG

## 2022-03-29 DIAGNOSIS — N30.01 ACUTE CYSTITIS WITH HEMATURIA: Primary | ICD-10-CM

## 2022-03-29 LAB
ALBUMIN SERPL-MCNC: 4.26 G/DL (ref 3.5–5.2)
ALBUMIN/GLOB SERPL: 1.8 G/DL
ALP SERPL-CCNC: 68 U/L (ref 39–117)
ALT SERPL W P-5'-P-CCNC: 40 U/L (ref 1–33)
ANION GAP SERPL CALCULATED.3IONS-SCNC: 10.7 MMOL/L (ref 5–15)
AST SERPL-CCNC: 35 U/L (ref 1–32)
BACTERIA UR QL AUTO: ABNORMAL /HPF
BASOPHILS # BLD AUTO: 0.01 10*3/MM3 (ref 0–0.2)
BASOPHILS NFR BLD AUTO: 0.2 % (ref 0–1.5)
BILIRUB SERPL-MCNC: 0.6 MG/DL (ref 0–1.2)
BILIRUB UR QL STRIP: NEGATIVE
BUN SERPL-MCNC: 7 MG/DL (ref 6–20)
BUN/CREAT SERPL: 11.7 (ref 7–25)
CALCIUM SPEC-SCNC: 9 MG/DL (ref 8.6–10.5)
CHLORIDE SERPL-SCNC: 98 MMOL/L (ref 98–107)
CLARITY UR: ABNORMAL
CO2 SERPL-SCNC: 24.3 MMOL/L (ref 22–29)
COLOR UR: ABNORMAL
CREAT SERPL-MCNC: 0.6 MG/DL (ref 0.57–1)
CRP SERPL-MCNC: <0.3 MG/DL (ref 0–0.5)
DEPRECATED RDW RBC AUTO: 45.4 FL (ref 37–54)
EGFRCR SERPLBLD CKD-EPI 2021: 111.6 ML/MIN/1.73
EOSINOPHIL # BLD AUTO: 0.1 10*3/MM3 (ref 0–0.4)
EOSINOPHIL NFR BLD AUTO: 1.9 % (ref 0.3–6.2)
ERYTHROCYTE [DISTWIDTH] IN BLOOD BY AUTOMATED COUNT: 13.3 % (ref 12.3–15.4)
GLOBULIN UR ELPH-MCNC: 2.3 GM/DL
GLUCOSE SERPL-MCNC: 115 MG/DL (ref 65–99)
GLUCOSE UR STRIP-MCNC: NEGATIVE MG/DL
HCT VFR BLD AUTO: 43.4 % (ref 34–46.6)
HGB BLD-MCNC: 14.7 G/DL (ref 12–15.9)
HGB UR QL STRIP.AUTO: ABNORMAL
HYALINE CASTS UR QL AUTO: ABNORMAL /LPF
IMM GRANULOCYTES # BLD AUTO: 0.01 10*3/MM3 (ref 0–0.05)
IMM GRANULOCYTES NFR BLD AUTO: 0.2 % (ref 0–0.5)
KETONES UR QL STRIP: NEGATIVE
LEUKOCYTE ESTERASE UR QL STRIP.AUTO: ABNORMAL
LIPASE SERPL-CCNC: 54 U/L (ref 13–60)
LYMPHOCYTES # BLD AUTO: 2.04 10*3/MM3 (ref 0.7–3.1)
LYMPHOCYTES NFR BLD AUTO: 39.5 % (ref 19.6–45.3)
MCH RBC QN AUTO: 31.2 PG (ref 26.6–33)
MCHC RBC AUTO-ENTMCNC: 33.9 G/DL (ref 31.5–35.7)
MCV RBC AUTO: 92.1 FL (ref 79–97)
MONOCYTES # BLD AUTO: 0.44 10*3/MM3 (ref 0.1–0.9)
MONOCYTES NFR BLD AUTO: 8.5 % (ref 5–12)
NEUTROPHILS NFR BLD AUTO: 2.57 10*3/MM3 (ref 1.7–7)
NEUTROPHILS NFR BLD AUTO: 49.7 % (ref 42.7–76)
NITRITE UR QL STRIP: NEGATIVE
NRBC BLD AUTO-RTO: 0 /100 WBC (ref 0–0.2)
PH UR STRIP.AUTO: 5.5 [PH] (ref 5–8)
PLATELET # BLD AUTO: 144 10*3/MM3 (ref 140–450)
PMV BLD AUTO: 10.6 FL (ref 6–12)
POTASSIUM SERPL-SCNC: 3.4 MMOL/L (ref 3.5–5.2)
PROT SERPL-MCNC: 6.6 G/DL (ref 6–8.5)
PROT UR QL STRIP: ABNORMAL
RBC # BLD AUTO: 4.71 10*6/MM3 (ref 3.77–5.28)
RBC # UR STRIP: ABNORMAL /HPF
REF LAB TEST METHOD: ABNORMAL
SODIUM SERPL-SCNC: 133 MMOL/L (ref 136–145)
SP GR UR STRIP: 1.01 (ref 1–1.03)
SQUAMOUS #/AREA URNS HPF: ABNORMAL /HPF
UROBILINOGEN UR QL STRIP: ABNORMAL
WBC # UR STRIP: ABNORMAL /HPF
WBC NRBC COR # BLD: 5.17 10*3/MM3 (ref 3.4–10.8)

## 2022-03-29 PROCEDURE — 80053 COMPREHEN METABOLIC PANEL: CPT | Performed by: PHYSICIAN ASSISTANT

## 2022-03-29 PROCEDURE — 36415 COLL VENOUS BLD VENIPUNCTURE: CPT

## 2022-03-29 PROCEDURE — 25010000002 ONDANSETRON PER 1 MG: Performed by: PHYSICIAN ASSISTANT

## 2022-03-29 PROCEDURE — 86140 C-REACTIVE PROTEIN: CPT | Performed by: PHYSICIAN ASSISTANT

## 2022-03-29 PROCEDURE — 96375 TX/PRO/DX INJ NEW DRUG ADDON: CPT

## 2022-03-29 PROCEDURE — 25010000002 MORPHINE PER 10 MG: Performed by: EMERGENCY MEDICINE

## 2022-03-29 PROCEDURE — 99283 EMERGENCY DEPT VISIT LOW MDM: CPT

## 2022-03-29 PROCEDURE — 74176 CT ABD & PELVIS W/O CONTRAST: CPT

## 2022-03-29 PROCEDURE — 25010000002 CEFTRIAXONE PER 250 MG: Performed by: PHYSICIAN ASSISTANT

## 2022-03-29 PROCEDURE — 85025 COMPLETE CBC W/AUTO DIFF WBC: CPT | Performed by: PHYSICIAN ASSISTANT

## 2022-03-29 PROCEDURE — 96374 THER/PROPH/DIAG INJ IV PUSH: CPT

## 2022-03-29 PROCEDURE — 83690 ASSAY OF LIPASE: CPT | Performed by: PHYSICIAN ASSISTANT

## 2022-03-29 PROCEDURE — 81001 URINALYSIS AUTO W/SCOPE: CPT | Performed by: PHYSICIAN ASSISTANT

## 2022-03-29 RX ORDER — CEFDINIR 300 MG/1
300 CAPSULE ORAL 2 TIMES DAILY
Qty: 14 CAPSULE | Refills: 0 | Status: SHIPPED | OUTPATIENT
Start: 2022-03-29 | End: 2022-04-05

## 2022-03-29 RX ORDER — SODIUM CHLORIDE 0.9 % (FLUSH) 0.9 %
10 SYRINGE (ML) INJECTION AS NEEDED
Status: DISCONTINUED | OUTPATIENT
Start: 2022-03-29 | End: 2022-03-29 | Stop reason: HOSPADM

## 2022-03-29 RX ORDER — ONDANSETRON 2 MG/ML
4 INJECTION INTRAMUSCULAR; INTRAVENOUS ONCE
Status: COMPLETED | OUTPATIENT
Start: 2022-03-29 | End: 2022-03-29

## 2022-03-29 RX ADMIN — SODIUM CHLORIDE 1000 ML: 9 INJECTION, SOLUTION INTRAVENOUS at 18:23

## 2022-03-29 RX ADMIN — ONDANSETRON 4 MG: 2 INJECTION INTRAMUSCULAR; INTRAVENOUS at 18:48

## 2022-03-29 RX ADMIN — MORPHINE SULFATE 4 MG: 4 INJECTION, SOLUTION INTRAMUSCULAR; INTRAVENOUS at 18:47

## 2022-03-29 RX ADMIN — CEFTRIAXONE 1 G: 10 INJECTION, POWDER, FOR SOLUTION INTRAVENOUS at 19:34

## 2022-04-12 ENCOUNTER — HOSPITAL ENCOUNTER (EMERGENCY)
Facility: HOSPITAL | Age: 47
Discharge: HOME | End: 2022-04-12
Attending: STUDENT IN AN ORGANIZED HEALTH CARE EDUCATION/TRAINING PROGRAM | Admitting: STUDENT IN AN ORGANIZED HEALTH CARE EDUCATION/TRAINING PROGRAM

## 2022-04-12 ENCOUNTER — APPOINTMENT (OUTPATIENT)
Dept: CT IMAGING | Facility: HOSPITAL | Age: 47
End: 2022-04-12

## 2022-04-12 VITALS
WEIGHT: 120 LBS | DIASTOLIC BLOOD PRESSURE: 75 MMHG | HEART RATE: 72 BPM | HEIGHT: 68 IN | RESPIRATION RATE: 18 BRPM | OXYGEN SATURATION: 100 % | SYSTOLIC BLOOD PRESSURE: 131 MMHG | BODY MASS INDEX: 18.19 KG/M2 | TEMPERATURE: 97.6 F

## 2022-04-12 DIAGNOSIS — K52.9 COLITIS: ICD-10-CM

## 2022-04-12 DIAGNOSIS — R10.32 LEFT LOWER QUADRANT ABDOMINAL PAIN: Primary | ICD-10-CM

## 2022-04-12 LAB
ALBUMIN SERPL-MCNC: 4.04 G/DL (ref 3.5–5.2)
ALBUMIN/GLOB SERPL: 1.7 G/DL
ALP SERPL-CCNC: 67 U/L (ref 39–117)
ALT SERPL W P-5'-P-CCNC: 40 U/L (ref 1–33)
ANION GAP SERPL CALCULATED.3IONS-SCNC: 7.4 MMOL/L (ref 5–15)
AST SERPL-CCNC: 39 U/L (ref 1–32)
BASOPHILS # BLD AUTO: 0.02 10*3/MM3 (ref 0–0.2)
BASOPHILS NFR BLD AUTO: 0.5 % (ref 0–1.5)
BILIRUB SERPL-MCNC: 0.5 MG/DL (ref 0–1.2)
BILIRUB UR QL STRIP: NEGATIVE
BUN SERPL-MCNC: 5 MG/DL (ref 6–20)
BUN/CREAT SERPL: 8.5 (ref 7–25)
CALCIUM SPEC-SCNC: 8.9 MG/DL (ref 8.6–10.5)
CHLORIDE SERPL-SCNC: 103 MMOL/L (ref 98–107)
CLARITY UR: CLEAR
CO2 SERPL-SCNC: 25.6 MMOL/L (ref 22–29)
COLOR UR: YELLOW
CREAT SERPL-MCNC: 0.59 MG/DL (ref 0.57–1)
D-LACTATE SERPL-SCNC: 0.7 MMOL/L (ref 0.5–2)
DEPRECATED RDW RBC AUTO: 43.7 FL (ref 37–54)
EGFRCR SERPLBLD CKD-EPI 2021: 112 ML/MIN/1.73
EOSINOPHIL # BLD AUTO: 0.11 10*3/MM3 (ref 0–0.4)
EOSINOPHIL NFR BLD AUTO: 2.6 % (ref 0.3–6.2)
ERYTHROCYTE [DISTWIDTH] IN BLOOD BY AUTOMATED COUNT: 13.1 % (ref 12.3–15.4)
GLOBULIN UR ELPH-MCNC: 2.4 GM/DL
GLUCOSE SERPL-MCNC: 98 MG/DL (ref 65–99)
GLUCOSE UR STRIP-MCNC: NEGATIVE MG/DL
HCG SERPL QL: POSITIVE
HCT VFR BLD AUTO: 41.1 % (ref 34–46.6)
HGB BLD-MCNC: 13.7 G/DL (ref 12–15.9)
HGB UR QL STRIP.AUTO: NEGATIVE
HOLD SPECIMEN: NORMAL
HOLD SPECIMEN: NORMAL
IMM GRANULOCYTES # BLD AUTO: 0.01 10*3/MM3 (ref 0–0.05)
IMM GRANULOCYTES NFR BLD AUTO: 0.2 % (ref 0–0.5)
KETONES UR QL STRIP: NEGATIVE
LEUKOCYTE ESTERASE UR QL STRIP.AUTO: NEGATIVE
LIPASE SERPL-CCNC: 34 U/L (ref 13–60)
LYMPHOCYTES # BLD AUTO: 1.48 10*3/MM3 (ref 0.7–3.1)
LYMPHOCYTES NFR BLD AUTO: 35.4 % (ref 19.6–45.3)
MCH RBC QN AUTO: 30.4 PG (ref 26.6–33)
MCHC RBC AUTO-ENTMCNC: 33.3 G/DL (ref 31.5–35.7)
MCV RBC AUTO: 91.3 FL (ref 79–97)
MONOCYTES # BLD AUTO: 0.33 10*3/MM3 (ref 0.1–0.9)
MONOCYTES NFR BLD AUTO: 7.9 % (ref 5–12)
NEUTROPHILS NFR BLD AUTO: 2.23 10*3/MM3 (ref 1.7–7)
NEUTROPHILS NFR BLD AUTO: 53.4 % (ref 42.7–76)
NITRITE UR QL STRIP: NEGATIVE
NRBC BLD AUTO-RTO: 0 /100 WBC (ref 0–0.2)
PH UR STRIP.AUTO: 8 [PH] (ref 5–8)
PLATELET # BLD AUTO: 122 10*3/MM3 (ref 140–450)
PMV BLD AUTO: 10.5 FL (ref 6–12)
POTASSIUM SERPL-SCNC: 4.4 MMOL/L (ref 3.5–5.2)
PROT SERPL-MCNC: 6.4 G/DL (ref 6–8.5)
PROT UR QL STRIP: NEGATIVE
RBC # BLD AUTO: 4.5 10*6/MM3 (ref 3.77–5.28)
SODIUM SERPL-SCNC: 136 MMOL/L (ref 136–145)
SP GR UR STRIP: 1.01 (ref 1–1.03)
UROBILINOGEN UR QL STRIP: NORMAL
WBC NRBC COR # BLD: 4.18 10*3/MM3 (ref 3.4–10.8)
WHOLE BLOOD HOLD SPECIMEN: NORMAL
WHOLE BLOOD HOLD SPECIMEN: NORMAL

## 2022-04-12 PROCEDURE — 81003 URINALYSIS AUTO W/O SCOPE: CPT | Performed by: STUDENT IN AN ORGANIZED HEALTH CARE EDUCATION/TRAINING PROGRAM

## 2022-04-12 PROCEDURE — 83690 ASSAY OF LIPASE: CPT | Performed by: STUDENT IN AN ORGANIZED HEALTH CARE EDUCATION/TRAINING PROGRAM

## 2022-04-12 PROCEDURE — 25010000002 DIPHENHYDRAMINE PER 50 MG: Performed by: STUDENT IN AN ORGANIZED HEALTH CARE EDUCATION/TRAINING PROGRAM

## 2022-04-12 PROCEDURE — 25010000002 IOPAMIDOL 61 % SOLUTION: Performed by: STUDENT IN AN ORGANIZED HEALTH CARE EDUCATION/TRAINING PROGRAM

## 2022-04-12 PROCEDURE — 96375 TX/PRO/DX INJ NEW DRUG ADDON: CPT

## 2022-04-12 PROCEDURE — 84703 CHORIONIC GONADOTROPIN ASSAY: CPT | Performed by: STUDENT IN AN ORGANIZED HEALTH CARE EDUCATION/TRAINING PROGRAM

## 2022-04-12 PROCEDURE — 25010000002 DEXAMETHASONE PER 1 MG: Performed by: STUDENT IN AN ORGANIZED HEALTH CARE EDUCATION/TRAINING PROGRAM

## 2022-04-12 PROCEDURE — 85025 COMPLETE CBC W/AUTO DIFF WBC: CPT | Performed by: STUDENT IN AN ORGANIZED HEALTH CARE EDUCATION/TRAINING PROGRAM

## 2022-04-12 PROCEDURE — 36415 COLL VENOUS BLD VENIPUNCTURE: CPT

## 2022-04-12 PROCEDURE — 80053 COMPREHEN METABOLIC PANEL: CPT | Performed by: STUDENT IN AN ORGANIZED HEALTH CARE EDUCATION/TRAINING PROGRAM

## 2022-04-12 PROCEDURE — 96374 THER/PROPH/DIAG INJ IV PUSH: CPT

## 2022-04-12 PROCEDURE — 74177 CT ABD & PELVIS W/CONTRAST: CPT | Performed by: RADIOLOGY

## 2022-04-12 PROCEDURE — 25010000002 HEPARIN LOCK FLUSH PER 10 UNITS: Performed by: STUDENT IN AN ORGANIZED HEALTH CARE EDUCATION/TRAINING PROGRAM

## 2022-04-12 PROCEDURE — 74177 CT ABD & PELVIS W/CONTRAST: CPT

## 2022-04-12 PROCEDURE — 99284 EMERGENCY DEPT VISIT MOD MDM: CPT

## 2022-04-12 PROCEDURE — 83605 ASSAY OF LACTIC ACID: CPT | Performed by: STUDENT IN AN ORGANIZED HEALTH CARE EDUCATION/TRAINING PROGRAM

## 2022-04-12 PROCEDURE — 25010000002 MORPHINE PER 10 MG: Performed by: STUDENT IN AN ORGANIZED HEALTH CARE EDUCATION/TRAINING PROGRAM

## 2022-04-12 RX ORDER — PREDNISONE 50 MG/1
50 TABLET ORAL DAILY
Qty: 5 TABLET | Refills: 0 | OUTPATIENT
Start: 2022-04-12 | End: 2022-11-27

## 2022-04-12 RX ORDER — SODIUM CHLORIDE 0.9 % (FLUSH) 0.9 %
10 SYRINGE (ML) INJECTION AS NEEDED
Status: DISCONTINUED | OUTPATIENT
Start: 2022-04-12 | End: 2022-04-12 | Stop reason: HOSPADM

## 2022-04-12 RX ORDER — CIPROFLOXACIN 500 MG/1
500 TABLET, FILM COATED ORAL ONCE
Status: COMPLETED | OUTPATIENT
Start: 2022-04-12 | End: 2022-04-12

## 2022-04-12 RX ORDER — CIPROFLOXACIN 500 MG/1
500 TABLET, FILM COATED ORAL 2 TIMES DAILY
Qty: 19 TABLET | Refills: 0 | Status: SHIPPED | OUTPATIENT
Start: 2022-04-12

## 2022-04-12 RX ORDER — DIPHENHYDRAMINE HYDROCHLORIDE 50 MG/ML
12.5 INJECTION INTRAMUSCULAR; INTRAVENOUS ONCE
Status: COMPLETED | OUTPATIENT
Start: 2022-04-12 | End: 2022-04-12

## 2022-04-12 RX ORDER — DEXAMETHASONE SODIUM PHOSPHATE 4 MG/ML
8 INJECTION, SOLUTION INTRA-ARTICULAR; INTRALESIONAL; INTRAMUSCULAR; INTRAVENOUS; SOFT TISSUE ONCE
Status: COMPLETED | OUTPATIENT
Start: 2022-04-12 | End: 2022-04-12

## 2022-04-12 RX ORDER — METRONIDAZOLE 250 MG/1
500 TABLET ORAL ONCE
Status: COMPLETED | OUTPATIENT
Start: 2022-04-12 | End: 2022-04-12

## 2022-04-12 RX ORDER — METRONIDAZOLE 500 MG/1
500 TABLET ORAL 3 TIMES DAILY
Qty: 30 TABLET | Refills: 0 | Status: SHIPPED | OUTPATIENT
Start: 2022-04-12

## 2022-04-12 RX ORDER — HEPARIN SODIUM (PORCINE) LOCK FLUSH IV SOLN 100 UNIT/ML 100 UNIT/ML
300 SOLUTION INTRAVENOUS ONCE
Status: COMPLETED | OUTPATIENT
Start: 2022-04-12 | End: 2022-04-12

## 2022-04-12 RX ADMIN — DEXAMETHASONE SODIUM PHOSPHATE 8 MG: 4 INJECTION, SOLUTION INTRA-ARTICULAR; INTRALESIONAL; INTRAMUSCULAR; INTRAVENOUS; SOFT TISSUE at 17:16

## 2022-04-12 RX ADMIN — CIPROFLOXACIN 500 MG: 500 TABLET, FILM COATED ORAL at 17:16

## 2022-04-12 RX ADMIN — MORPHINE SULFATE 4 MG: 4 INJECTION, SOLUTION INTRAMUSCULAR; INTRAVENOUS at 14:29

## 2022-04-12 RX ADMIN — IOPAMIDOL 88 ML: 612 INJECTION, SOLUTION INTRAVENOUS at 15:29

## 2022-04-12 RX ADMIN — METRONIDAZOLE 500 MG: 250 TABLET ORAL at 17:15

## 2022-04-12 RX ADMIN — DIPHENHYDRAMINE HYDROCHLORIDE 12.5 MG: 50 INJECTION, SOLUTION INTRAMUSCULAR; INTRAVENOUS at 14:30

## 2022-04-12 RX ADMIN — HEPARIN SODIUM (PORCINE) LOCK FLUSH IV SOLN 100 UNIT/ML 300 UNITS: 100 SOLUTION at 17:28

## 2022-04-12 NOTE — ED PROVIDER NOTES
Subjective   47-year-old female with past medical history of bipolar disorder irritable bowel presents to the ER with primary complaint left lower quadrant abdominal pain.  Patient notes severe nature.  Patient confirmed blood-tinged stool/bowel movements.  Denied changes in urinary habits.  Denied fever or chills.  Denied headache or vision changes.  No obvious alleviating factors.  No obvious aggravating factors.  Vitals stable          Review of Systems   Gastrointestinal: Positive for abdominal pain and blood in stool.   All other systems reviewed and are negative.      Past Medical History:   Diagnosis Date   • Abdominal pain    • Abdominal swelling    • Hoyos's disease    • Bipolar 1 disorder (HCC)    • Brain tumor (benign) (HCC)    • Brain tumor (HCC)     R Frontal Lobe per pt   • Brain tumor (HCC) 2014   • Constipation    • DDD (degenerative disc disease), cervical 05/29/2017   • DDD (degenerative disc disease), cervical    • Diarrhea    • Fibromyalgia    • IBS (irritable bowel syndrome)    • Migraine    • Nausea & vomiting    • PONV (postoperative nausea and vomiting)    • PTSD (post-traumatic stress disorder)    • Rectal bleeding        Allergies   Allergen Reactions   • Ativan [Lorazepam] Hallucinations     confusion   • Sulfa Antibiotics Shortness Of Breath and Swelling   • Sulfa Antibiotics Anaphylaxis   • Reglan [Metoclopramide] Angioedema   • Compazine [Prochlorperazine Edisylate] Hives   • Demerol [Meperidine] Hives   • Droperidol Itching   • Metoclopramide Swelling   • Toradol [Ketorolac Tromethamine] Hives and Itching   • Toradol [Ketorolac Tromethamine] Hives   • Zofran [Ondansetron Hcl] Rash   • Zosyn [Piperacillin Sod-Tazobactam So] Hives       Past Surgical History:   Procedure Laterality Date   • ANAL SCOPE N/A 7/28/2016    Procedure: ANAL SCOPE;  Surgeon: Kael Lopez MD;  Location: [facility];  Service:    • APPENDECTOMY     • COLONOSCOPY N/A 6/30/2016    Procedure: COLONOSCOPY   CPTCODE:09648;  Surgeon: Jose Antonio Belle III, MD;  Location: [facility] OR;  Service:    • COLONOSCOPY N/A 2016    Procedure: COLONOSCOPY (69179) CPT;  Surgeon: Jose Antonio Belle III, MD;  Location: [facility] OR;  Service:    • CYSTOSCOPY RETROGRADE PYELOGRAM Bilateral 2017    Procedure: CYSTOSCOPY RETROGRADE PYELOGRAM;  Surgeon: Mu Blunt MD;  Location: [facility] OR;  Service:    • ENDOSCOPY N/A 2016    Procedure: ESOPHAGOGASTRODUODENOSCOPY WITH BIOPSY  CPTCODE:97874;  Surgeon: Jose Antonio Belle III, MD;  Location: [facility] OR;  Service:    • HEMORRHOIDECTOMY N/A 2016    Procedure: HEMORRHOID STAPLING;  Surgeon: Kael Lopez MD;  Location: [facility] OR;  Service:    • HYSTERECTOMY     • KNEE SURGERY     • LAPAROSCOPIC SALPINGOOPHERECTOMY     • PORTACATH PLACEMENT N/A 2017    Procedure: INSERTION OF PORTACATH;  Surgeon: Celso Arredondo MD;  Location: [facility] OR;  Service:    • SHOULDER SURGERY      3 times       Family History   Problem Relation Age of Onset   • Crohn's disease Other    • Hypertension Other    • Diabetes Other    • Irritable bowel syndrome Other    • No Known Problems Father    • No Known Problems Mother        Social History     Socioeconomic History   • Marital status:    Tobacco Use   • Smoking status: Former Smoker     Packs/day: 1.00     Years: 20.00     Pack years: 20.00     Quit date: 2015     Years since quittin.4   • Smokeless tobacco: Never Used   Vaping Use   • Vaping Use: Never used   Substance and Sexual Activity   • Alcohol use: No   • Drug use: Yes     Types: Marijuana     Comment: for pain   • Sexual activity: Defer     Birth control/protection: Surgical           Objective   Physical Exam  Constitutional:       General: She is not in acute distress.     Appearance: Normal appearance. She is not ill-appearing.   HENT:      Head: Normocephalic and atraumatic.      Right Ear: External ear normal.      Left Ear: External ear normal.       Nose: Nose normal.      Mouth/Throat:      Mouth: Mucous membranes are moist.   Eyes:      Extraocular Movements: Extraocular movements intact.      Pupils: Pupils are equal, round, and reactive to light.   Cardiovascular:      Rate and Rhythm: Normal rate and regular rhythm.      Heart sounds: No murmur heard.  Pulmonary:      Effort: Pulmonary effort is normal. No respiratory distress.      Breath sounds: Normal breath sounds. No wheezing.   Abdominal:      General: Bowel sounds are normal.      Palpations: Abdomen is soft.      Tenderness: There is abdominal tenderness in the left lower quadrant.   Musculoskeletal:         General: No deformity or signs of injury. Normal range of motion.      Cervical back: Normal range of motion and neck supple.   Skin:     General: Skin is warm and dry.      Findings: No erythema.   Neurological:      General: No focal deficit present.      Mental Status: She is alert and oriented to person, place, and time. Mental status is at baseline.      Cranial Nerves: No cranial nerve deficit.   Psychiatric:         Mood and Affect: Mood normal.         Behavior: Behavior normal.         Thought Content: Thought content normal.         Procedures           ED Course  ED Course as of 04/12/22 1707   Tue Apr 12, 2022   1704 CBC and CCP are unremarkable.  Lipase and lactic acid within normal is.  Urinalysis unremarkable.  Patient's hCG is positive.  However, patient has had a hysterectomy.  Patient notes a history of hCG positive findings.  CT of abdomen pelvis no no acute abnormality.  Given the patient's history of irritable bowel, I will treat the patient with steroids and Cipro/Flagyl.  Work up and results were discussed throughly with the patient.  The patient will be discharged for further monitoring and management with their PCP.  Red flags, warning signs, worsening symptoms, and when to return to the ER discussed with and understood by the patient.  Patient will follow up with  their PCP in a timely manner.  Vitals stable at discharge. [SF]      ED Course User Index  [SF] Jean Paul Hussein DO                                                 MDM    Final diagnoses:   Left lower quadrant abdominal pain   Colitis       ED Disposition  ED Disposition     ED Disposition   Discharge    Condition   Stable    Comment   --             No follow-up provider specified.       Medication List      New Prescriptions    ciprofloxacin 500 MG tablet  Commonly known as: CIPRO  Take 1 tablet by mouth 2 (Two) Times a Day.     metroNIDAZOLE 500 MG tablet  Commonly known as: FLAGYL  Take 1 tablet by mouth 3 (Three) Times a Day.     predniSONE 50 MG tablet  Commonly known as: DELTASONE  Take 1 tablet by mouth Daily.           Where to Get Your Medications      These medications were sent to [place], [place] - [address] [place] [address] - [phone]  - [phone] [address] [address]    Phone: [phone]   · ciprofloxacin 500 MG tablet  · metroNIDAZOLE 500 MG tablet  · predniSONE 50 MG tablet          Jean Paul Hussein DO  04/12/22 2325

## 2022-04-19 ENCOUNTER — OFFICE VISIT (OUTPATIENT)
Dept: UROLOGY | Facility: CLINIC | Age: 47
End: 2022-04-19

## 2022-04-19 VITALS — HEIGHT: 68 IN | BODY MASS INDEX: 18.18 KG/M2 | WEIGHT: 119.93 LBS

## 2022-04-19 DIAGNOSIS — N23 RENAL COLIC: ICD-10-CM

## 2022-04-19 DIAGNOSIS — N35.028 OTHER POST-TRAUMATIC URETHRAL STRICTURE, FEMALE: Primary | ICD-10-CM

## 2022-04-19 PROCEDURE — 96372 THER/PROPH/DIAG INJ SC/IM: CPT | Performed by: UROLOGY

## 2022-04-19 PROCEDURE — 53661 DILATION OF URETHRA: CPT | Performed by: UROLOGY

## 2022-04-19 RX ORDER — OXYCODONE AND ACETAMINOPHEN 10; 325 MG/1; MG/1
1 TABLET ORAL EVERY 6 HOURS PRN
Qty: 14 TABLET | Refills: 0 | Status: SHIPPED | OUTPATIENT
Start: 2022-04-19 | End: 2022-05-17 | Stop reason: SDUPTHER

## 2022-04-19 RX ORDER — GENTAMICIN SULFATE 40 MG/ML
80 INJECTION, SOLUTION INTRAMUSCULAR; INTRAVENOUS ONCE
Status: COMPLETED | OUTPATIENT
Start: 2022-04-19 | End: 2022-04-19

## 2022-04-19 RX ORDER — PROMETHAZINE HYDROCHLORIDE 25 MG/1
25 TABLET ORAL EVERY 6 HOURS PRN
Qty: 21 TABLET | Refills: 2 | Status: SHIPPED | OUTPATIENT
Start: 2022-04-19 | End: 2022-05-25 | Stop reason: SDUPTHER

## 2022-04-19 RX ADMIN — GENTAMICIN SULFATE 80 MG: 40 INJECTION, SOLUTION INTRAMUSCULAR; INTRAVENOUS at 13:26

## 2022-04-19 NOTE — PROGRESS NOTES
Chief Complaint:          Urethral stricture    HPI:   47 y.o. female with severe urethral stricture here for intermittent dilation      Past Medical History:        Past Medical History:   Diagnosis Date   • Abdominal pain    • Abdominal swelling    • Hoyos's disease    • Bipolar 1 disorder (HCC)    • Brain tumor (benign) (HCC)    • Brain tumor (HCC)     R Frontal Lobe per pt   • Brain tumor (HCC) 2014   • Constipation    • DDD (degenerative disc disease), cervical 05/29/2017   • DDD (degenerative disc disease), cervical    • Diarrhea    • Fibromyalgia    • IBS (irritable bowel syndrome)    • Migraine    • Nausea & vomiting    • PONV (postoperative nausea and vomiting)    • PTSD (post-traumatic stress disorder)    • Rectal bleeding          Current Meds:     Current Outpatient Medications   Medication Sig Dispense Refill   • oxyCODONE-acetaminophen (Percocet)  MG per tablet Take 1 tablet by mouth Every 6 (Six) Hours As Needed for Moderate Pain . 14 tablet 0   • albuterol (PROVENTIL HFA;VENTOLIN HFA) 108 (90 Base) MCG/ACT inhaler Inhale 2 puffs 2 (Two) Times a Day.     • Azelastine HCl 137 MCG/SPRAY solution 1 spray into each nostril As Needed (Allergies).     • busPIRone (BUSPAR) 15 MG tablet Take 15 mg by mouth 3 (three) times a day        • ciprofloxacin (CIPRO) 500 MG tablet Take 1 tablet by mouth 2 (Two) Times a Day. 19 tablet 0   • cyclobenzaprine (FLEXERIL) 5 MG tablet      • diclofenac (VOLTAREN) 1 % gel gel      • divalproex (DEPAKOTE) 500 MG DR tablet Take 1 tablet by mouth 3 (Three) Times a Day. 90 tablet 2   • docusate sodium (COLACE) 100 MG capsule Take 1 capsule by mouth 2 (Two) Times a Day. 20 capsule 0   • docusate sodium (COLACE) 100 MG capsule Take 1 capsule by mouth 2 (Two) Times a Day As Needed for Constipation. 60 capsule 0   • fluticasone (VERAMYST) 27.5 MCG/SPRAY nasal spray 2 sprays into each nostril Daily.     • metroNIDAZOLE (FLAGYL) 500 MG tablet Take 1 tablet by mouth 3 (Three)  Times a Day. 30 tablet 0   • montelukast (SINGULAIR) 10 MG tablet Take 10 mg by mouth Every Night.     • oxyCODONE-acetaminophen (PERCOCET) 5-325 MG per tablet Take 1 tablet by mouth Every 8 (Eight) Hours As Needed for Moderate Pain . 4 tablet 0   • pantoprazole (PROTONIX) 40 MG EC tablet Take 40 mg by mouth 2 (Two) Times a Day As Needed.     • phenazopyridine (PYRIDIUM) 100 MG tablet      • polyethylene glycol (MIRALAX) 17 GM/SCOOP powder Take 17 g by mouth Daily. 225 g 0   • predniSONE (DELTASONE) 50 MG tablet Take 1 tablet by mouth Daily. 5 tablet 0   • promethazine (PHENERGAN) 25 MG tablet Take 1 tablet by mouth Every 6 (Six) Hours As Needed for Nausea or Vomiting. 15 tablet 0   • promethazine (PHENERGAN) 25 MG tablet Take 1 tablet by mouth Every 6 (Six) Hours As Needed for Nausea or Vomiting. 21 tablet 2   • promethazine (PHENERGAN) 25 MG tablet Take 1 tablet by mouth Every 6 (Six) Hours As Needed for Nausea or Vomiting. 21 tablet 2   • sertraline (ZOLOFT) 100 MG tablet Take 100 mg by mouth 2 (Two) Times a Day.     • SUMAtriptan (IMITREX) 6 MG/0.5ML injection Inject 6 mg under the skin 1 (One) Time.     • tiZANidine (ZANAFLEX) 4 MG tablet      • traMADol (ULTRAM) 50 MG tablet        No current facility-administered medications for this visit.        Allergies:      Allergies   Allergen Reactions   • Ativan [Lorazepam] Hallucinations     confusion   • Sulfa Antibiotics Shortness Of Breath and Swelling   • Sulfa Antibiotics Anaphylaxis   • Reglan [Metoclopramide] Angioedema   • Compazine [Prochlorperazine Edisylate] Hives   • Demerol [Meperidine] Hives   • Droperidol Itching   • Metoclopramide Swelling   • Toradol [Ketorolac Tromethamine] Hives and Itching   • Toradol [Ketorolac Tromethamine] Hives   • Zofran [Ondansetron Hcl] Rash   • Zosyn [Piperacillin Sod-Tazobactam So] Hives        Past Surgical History:     Past Surgical History:   Procedure Laterality Date   • ANAL SCOPE N/A 7/28/2016    Procedure: ANAL  SCOPE;  Surgeon: Kael Lopez MD;  Location: [facility] OR;  Service:    • APPENDECTOMY     • COLONOSCOPY N/A 2016    Procedure: COLONOSCOPY  CPTCODE:77877;  Surgeon: Jose Antonio Belle III, MD;  Location: [facility] OR;  Service:    • COLONOSCOPY N/A 2016    Procedure: COLONOSCOPY (06862) CPT;  Surgeon: Jose Antonio Belle III, MD;  Location: [facility] OR;  Service:    • CYSTOSCOPY RETROGRADE PYELOGRAM Bilateral 2017    Procedure: CYSTOSCOPY RETROGRADE PYELOGRAM;  Surgeon: Mu Blunt MD;  Location: [facility] OR;  Service:    • ENDOSCOPY N/A 2016    Procedure: ESOPHAGOGASTRODUODENOSCOPY WITH BIOPSY  CPTCODE:64880;  Surgeon: Jose Antonio Belle III, MD;  Location: [facility] OR;  Service:    • HEMORRHOIDECTOMY N/A 2016    Procedure: HEMORRHOID STAPLING;  Surgeon: Kael Lopez MD;  Location: [facility] OR;  Service:    • HYSTERECTOMY     • KNEE SURGERY     • LAPAROSCOPIC SALPINGOOPHERECTOMY     • PORTACATH PLACEMENT N/A 2017    Procedure: INSERTION OF PORTACATH;  Surgeon: Celso Arredondo MD;  Location: [facility] OR;  Service:    • SHOULDER SURGERY      3 times         Social History:     Social History     Socioeconomic History   • Marital status:    Tobacco Use   • Smoking status: Former Smoker     Packs/day: 1.00     Years: 20.00     Pack years: 20.00     Quit date: 2015     Years since quittin.4   • Smokeless tobacco: Never Used   Vaping Use   • Vaping Use: Never used   Substance and Sexual Activity   • Alcohol use: No   • Drug use: Yes     Types: Marijuana     Comment: for pain   • Sexual activity: Defer     Birth control/protection: Surgical       Family History:     Family History   Problem Relation Age of Onset   • Crohn's disease Other    • Hypertension Other    • Diabetes Other    • Irritable bowel syndrome Other    • No Known Problems Father    • No Known Problems Mother        Review of Systems:     Review of Systems   Constitutional: Negative.  Negative for  activity change, appetite change, chills, diaphoresis, fatigue and unexpected weight change.   HENT: Negative for congestion, dental problem, drooling, ear discharge, ear pain, facial swelling, hearing loss, mouth sores, nosebleeds, postnasal drip, rhinorrhea, sinus pressure, sneezing, sore throat, tinnitus, trouble swallowing and voice change.    Eyes: Negative.  Negative for photophobia, pain, discharge, redness, itching and visual disturbance.   Respiratory: Negative.  Negative for apnea, cough, choking, chest tightness, shortness of breath, wheezing and stridor.    Cardiovascular: Negative.  Negative for chest pain, palpitations and leg swelling.   Gastrointestinal: Negative.  Negative for abdominal distention, abdominal pain, anal bleeding, blood in stool, constipation, diarrhea, nausea, rectal pain and vomiting.   Endocrine: Negative.  Negative for cold intolerance, heat intolerance, polydipsia, polyphagia and polyuria.   Musculoskeletal: Negative.  Negative for arthralgias, back pain, gait problem, joint swelling, myalgias, neck pain and neck stiffness.   Skin: Negative.  Negative for color change, pallor, rash and wound.   Allergic/Immunologic: Negative.  Negative for environmental allergies, food allergies and immunocompromised state.   Neurological: Negative.  Negative for dizziness, tremors, seizures, syncope, facial asymmetry, speech difficulty, weakness, light-headedness, numbness and headaches.   Hematological: Negative.  Negative for adenopathy. Does not bruise/bleed easily.   Psychiatric/Behavioral: Negative for agitation, behavioral problems, confusion, decreased concentration, dysphoric mood, hallucinations, self-injury, sleep disturbance and suicidal ideas. The patient is not nervous/anxious and is not hyperactive.    All other systems reviewed and are negative.      Physical Exam:     Physical Exam  Constitutional:       Appearance: She is well-developed.   HENT:      Head: Normocephalic and  atraumatic.      Right Ear: External ear normal.      Left Ear: External ear normal.   Eyes:      Conjunctiva/sclera: Conjunctivae normal.      Pupils: Pupils are equal, round, and reactive to light.   Cardiovascular:      Rate and Rhythm: Normal rate and regular rhythm.      Heart sounds: Normal heart sounds.   Pulmonary:      Effort: Pulmonary effort is normal.      Breath sounds: Normal breath sounds.   Abdominal:      General: Bowel sounds are normal. There is no distension.      Palpations: Abdomen is soft. There is no mass.      Tenderness: There is no abdominal tenderness. There is no guarding or rebound.   Genitourinary:     Vagina: No vaginal discharge.   Musculoskeletal:         General: Normal range of motion.   Skin:     General: Skin is warm and dry.   Neurological:      Mental Status: She is alert.      Deep Tendon Reflexes: Reflexes are normal and symmetric.   Psychiatric:         Behavior: Behavior normal.         Thought Content: Thought content normal.         Judgment: Judgment normal.         I have reviewed the following portions of the patient's history: Allergies, current medications, past family history, past medical history, past social history, past surgical history, problem list, and ROS and confirm it is accurate.      Procedure:   Urethral dilation-after an appropriate informed consent, the patient was brought to the procedure suite.  The urethra was gently anesthetized with 10 mL of 2% viscous Xylocaine jelly.  After an adequate period of topical anesthesia I went ahead and dilated with [place] sounds from 16 to 28 [place] sequentially without complication. The patient was given gentamicin as prophylaxis with 80 mg.    Assessment/Plan:   Urethral stricture-status post dilation  Narcotic pain medication-patient has significant acute pain that I believe would be an indication for the use of narcotic pain medication.  I discussed the significant risks of pain medication and the fact that  this will be a short only option and I will give her no more than a three-day supply of pain medication, I will not plan long-term medication, and that this will be sent to a pain clinic if it at all becomes necessary.  We discussed signing a pain medication agreement and the fact that we're going to run a state [place] review to be sure the patient is not getting pain medication from elsewhere.  If this is the case, we will not give pain medication as part of the patient's treatment plan of there being prescribed a controlled substance with potential for abuse.  This patient has been well aware of the appropriate dose of such medications including the risks for somnolence, limited ability to drive and/or safety and the significant potential for overdose.  It has been made clear that these medications are for the prescribed patient only without concomitant use of alcohol or other substance unless prescribed by the medical provider.  Has completed prescribing agreement detailing the terms of continue prescribing him a controlled substance including monitoring Luis Alberto reports, the possibility of urine drug screens, and pill counts.  The patient is aware that we review LUIS ALBERTO reports on a regular basis and scan them into the chart.  History and physical examination exhibited continued safe and appropriate use of controlled substances. We also discussed the fact that the new [place] legislation allows only a three-day prescription for pain medication.  In this situation he will be referred to a chronic pain clinic.                This document has been electronically signed by VERENICE FINK MD April 19, 2022 13:24 EDT

## 2022-04-26 ENCOUNTER — HOSPITAL ENCOUNTER (EMERGENCY)
Facility: HOSPITAL | Age: 47
Discharge: HOME | End: 2022-04-26
Attending: EMERGENCY MEDICINE | Admitting: EMERGENCY MEDICINE

## 2022-04-26 VITALS
RESPIRATION RATE: 14 BRPM | DIASTOLIC BLOOD PRESSURE: 78 MMHG | OXYGEN SATURATION: 98 % | TEMPERATURE: 98.3 F | HEIGHT: 68 IN | SYSTOLIC BLOOD PRESSURE: 118 MMHG | WEIGHT: 121 LBS | BODY MASS INDEX: 18.34 KG/M2 | HEART RATE: 77 BPM

## 2022-04-26 DIAGNOSIS — G43.909 MIGRAINE WITHOUT STATUS MIGRAINOSUS, NOT INTRACTABLE, UNSPECIFIED MIGRAINE TYPE: Primary | ICD-10-CM

## 2022-04-26 LAB
ALBUMIN SERPL-MCNC: 3.91 G/DL (ref 3.5–5.2)
ALBUMIN/GLOB SERPL: 1.9 G/DL
ALP SERPL-CCNC: 65 U/L (ref 39–117)
ALT SERPL W P-5'-P-CCNC: 27 U/L (ref 1–33)
ANION GAP SERPL CALCULATED.3IONS-SCNC: 8.8 MMOL/L (ref 5–15)
AST SERPL-CCNC: 29 U/L (ref 1–32)
BASOPHILS # BLD AUTO: 0.02 10*3/MM3 (ref 0–0.2)
BASOPHILS NFR BLD AUTO: 0.5 % (ref 0–1.5)
BILIRUB SERPL-MCNC: 0.6 MG/DL (ref 0–1.2)
BUN SERPL-MCNC: 3 MG/DL (ref 6–20)
BUN/CREAT SERPL: 5.8 (ref 7–25)
CALCIUM SPEC-SCNC: 8.8 MG/DL (ref 8.6–10.5)
CHLORIDE SERPL-SCNC: 106 MMOL/L (ref 98–107)
CO2 SERPL-SCNC: 27.2 MMOL/L (ref 22–29)
CREAT SERPL-MCNC: 0.52 MG/DL (ref 0.57–1)
CRP SERPL-MCNC: <0.3 MG/DL (ref 0–0.5)
DEPRECATED RDW RBC AUTO: 45.3 FL (ref 37–54)
EGFRCR SERPLBLD CKD-EPI 2021: 115.5 ML/MIN/1.73
EOSINOPHIL # BLD AUTO: 0.16 10*3/MM3 (ref 0–0.4)
EOSINOPHIL NFR BLD AUTO: 3.8 % (ref 0.3–6.2)
ERYTHROCYTE [DISTWIDTH] IN BLOOD BY AUTOMATED COUNT: 13.4 % (ref 12.3–15.4)
ERYTHROCYTE [SEDIMENTATION RATE] IN BLOOD: 2 MM/HR (ref 0–20)
GLOBULIN UR ELPH-MCNC: 2.1 GM/DL
GLUCOSE SERPL-MCNC: 70 MG/DL (ref 65–99)
HCT VFR BLD AUTO: 41.1 % (ref 34–46.6)
HGB BLD-MCNC: 13.8 G/DL (ref 12–15.9)
HOLD SPECIMEN: NORMAL
HOLD SPECIMEN: NORMAL
IMM GRANULOCYTES # BLD AUTO: 0.01 10*3/MM3 (ref 0–0.05)
IMM GRANULOCYTES NFR BLD AUTO: 0.2 % (ref 0–0.5)
LYMPHOCYTES # BLD AUTO: 1.37 10*3/MM3 (ref 0.7–3.1)
LYMPHOCYTES NFR BLD AUTO: 32.4 % (ref 19.6–45.3)
MCH RBC QN AUTO: 30.9 PG (ref 26.6–33)
MCHC RBC AUTO-ENTMCNC: 33.6 G/DL (ref 31.5–35.7)
MCV RBC AUTO: 92.2 FL (ref 79–97)
MONOCYTES # BLD AUTO: 0.31 10*3/MM3 (ref 0.1–0.9)
MONOCYTES NFR BLD AUTO: 7.3 % (ref 5–12)
NEUTROPHILS NFR BLD AUTO: 2.36 10*3/MM3 (ref 1.7–7)
NEUTROPHILS NFR BLD AUTO: 55.8 % (ref 42.7–76)
NRBC BLD AUTO-RTO: 0 /100 WBC (ref 0–0.2)
PLATELET # BLD AUTO: 118 10*3/MM3 (ref 140–450)
PMV BLD AUTO: 10.2 FL (ref 6–12)
POTASSIUM SERPL-SCNC: 3.6 MMOL/L (ref 3.5–5.2)
PROT SERPL-MCNC: 6 G/DL (ref 6–8.5)
RBC # BLD AUTO: 4.46 10*6/MM3 (ref 3.77–5.28)
SODIUM SERPL-SCNC: 142 MMOL/L (ref 136–145)
WBC NRBC COR # BLD: 4.23 10*3/MM3 (ref 3.4–10.8)
WHOLE BLOOD HOLD SPECIMEN: NORMAL
WHOLE BLOOD HOLD SPECIMEN: NORMAL

## 2022-04-26 PROCEDURE — 80053 COMPREHEN METABOLIC PANEL: CPT | Performed by: PHYSICIAN ASSISTANT

## 2022-04-26 PROCEDURE — 25010000002 DIPHENHYDRAMINE PER 50 MG: Performed by: PHYSICIAN ASSISTANT

## 2022-04-26 PROCEDURE — 25010000002 BUTORPHANOL PER 1 MG: Performed by: EMERGENCY MEDICINE

## 2022-04-26 PROCEDURE — 96374 THER/PROPH/DIAG INJ IV PUSH: CPT

## 2022-04-26 PROCEDURE — 25010000002 DEXAMETHASONE SODIUM PHOSPHATE 10 MG/ML SOLUTION: Performed by: PHYSICIAN ASSISTANT

## 2022-04-26 PROCEDURE — 99283 EMERGENCY DEPT VISIT LOW MDM: CPT

## 2022-04-26 PROCEDURE — 63710000001 PROMETHAZINE PER 25 MG: Performed by: PHYSICIAN ASSISTANT

## 2022-04-26 PROCEDURE — 85025 COMPLETE CBC W/AUTO DIFF WBC: CPT | Performed by: PHYSICIAN ASSISTANT

## 2022-04-26 PROCEDURE — 85652 RBC SED RATE AUTOMATED: CPT | Performed by: PHYSICIAN ASSISTANT

## 2022-04-26 PROCEDURE — 86140 C-REACTIVE PROTEIN: CPT | Performed by: PHYSICIAN ASSISTANT

## 2022-04-26 PROCEDURE — 96375 TX/PRO/DX INJ NEW DRUG ADDON: CPT

## 2022-04-26 PROCEDURE — 36415 COLL VENOUS BLD VENIPUNCTURE: CPT

## 2022-04-26 RX ORDER — DEXAMETHASONE SODIUM PHOSPHATE 10 MG/ML
10 INJECTION, SOLUTION INTRAMUSCULAR; INTRAVENOUS ONCE
Status: COMPLETED | OUTPATIENT
Start: 2022-04-26 | End: 2022-04-26

## 2022-04-26 RX ORDER — HYDROCODONE BITARTRATE AND ACETAMINOPHEN 5; 325 MG/1; MG/1
1 TABLET ORAL ONCE
Status: COMPLETED | OUTPATIENT
Start: 2022-04-26 | End: 2022-04-26

## 2022-04-26 RX ORDER — DIPHENHYDRAMINE HYDROCHLORIDE 50 MG/ML
25 INJECTION INTRAMUSCULAR; INTRAVENOUS ONCE
Status: COMPLETED | OUTPATIENT
Start: 2022-04-26 | End: 2022-04-26

## 2022-04-26 RX ORDER — PROMETHAZINE HYDROCHLORIDE 25 MG/1
25 TABLET ORAL ONCE
Status: COMPLETED | OUTPATIENT
Start: 2022-04-26 | End: 2022-04-26

## 2022-04-26 RX ORDER — SODIUM CHLORIDE 0.9 % (FLUSH) 0.9 %
10 SYRINGE (ML) INJECTION AS NEEDED
Status: DISCONTINUED | OUTPATIENT
Start: 2022-04-26 | End: 2022-04-26

## 2022-04-26 RX ORDER — BUTORPHANOL TARTRATE 1 MG/ML
1 INJECTION, SOLUTION INTRAMUSCULAR; INTRAVENOUS ONCE
Status: COMPLETED | OUTPATIENT
Start: 2022-04-26 | End: 2022-04-26

## 2022-04-26 RX ORDER — SODIUM CHLORIDE 0.9 % (FLUSH) 0.9 %
10 SYRINGE (ML) INJECTION AS NEEDED
Status: DISCONTINUED | OUTPATIENT
Start: 2022-04-26 | End: 2022-04-26 | Stop reason: HOSPADM

## 2022-04-26 RX ADMIN — DIPHENHYDRAMINE HYDROCHLORIDE 25 MG: 50 INJECTION, SOLUTION INTRAMUSCULAR; INTRAVENOUS at 14:28

## 2022-04-26 RX ADMIN — HYDROCODONE BITARTRATE AND ACETAMINOPHEN 1 TABLET: 5; 325 TABLET ORAL at 15:53

## 2022-04-26 RX ADMIN — BUTORPHANOL TARTRATE 1 MG: 1 INJECTION, SOLUTION INTRAMUSCULAR; INTRAVENOUS at 14:35

## 2022-04-26 RX ADMIN — PROMETHAZINE HYDROCHLORIDE 25 MG: 25 TABLET ORAL at 14:26

## 2022-04-26 RX ADMIN — DEXAMETHASONE SODIUM PHOSPHATE 10 MG: 10 INJECTION INTRAMUSCULAR; INTRAVENOUS at 14:28

## 2022-04-26 RX ADMIN — SODIUM CHLORIDE 1000 ML: 9 INJECTION, SOLUTION INTRAVENOUS at 14:31

## 2022-04-26 NOTE — ED PROVIDER NOTES
Subjective   47-year-old female who presents to the ED today for a headache.  She states that she has had a migraine headache for the last 4 days.  She states it is all across the front of her head.  She states that she feels dehydrated and that makes her headache worse than normal.  She states she has been taking Maxalt, Phenergan and Imitrex at home with no relief.  She states she last took Phenergan and Imitrex about 4 hours ago.  She states that she has had nausea and vomiting as well.  She denies any fever.  She denies any respiratory symptoms.      History provided by:  Patient  Headache  Pain location:  Frontal  Quality:  Dull  Radiates to:  Does not radiate  Severity currently:  10/10  Severity at highest:  10/10  Onset quality:  Gradual  Duration:  4 days  Timing:  Constant  Progression:  Worsening  Chronicity:  Recurrent  Similar to prior headaches: yes    Context: bright light and loud noise    Relieved by:  Nothing  Worsened by:  Light and sound  Ineffective treatments:  Prescription medications  Associated symptoms: fatigue, nausea and vomiting    Associated symptoms: no abdominal pain, no back pain, no blurred vision, no congestion, no cough, no diarrhea, no dizziness, no drainage, no ear pain, no eye pain, no facial pain, no fever, no focal weakness, no hearing loss, no loss of balance, no myalgias, no near-syncope, no neck pain, no neck stiffness, no numbness, no paresthesias, no photophobia, no seizures, no sinus pressure, no sore throat, no swollen glands, no syncope, no tingling, no URI, no visual change and no weakness        Review of Systems   Constitutional: Positive for appetite change and fatigue. Negative for fever.   HENT: Negative for congestion, ear pain, hearing loss, postnasal drip, sinus pressure and sore throat.    Eyes: Negative.  Negative for blurred vision, photophobia and pain.   Respiratory: Negative.  Negative for cough.    Cardiovascular: Negative.  Negative for syncope and  near-syncope.   Gastrointestinal: Positive for nausea and vomiting. Negative for abdominal pain and diarrhea.   Genitourinary: Negative.    Musculoskeletal: Negative for back pain, myalgias, neck pain and neck stiffness.   Skin: Negative.    Neurological: Positive for headaches. Negative for dizziness, focal weakness, seizures, weakness, numbness, paresthesias and loss of balance.   Psychiatric/Behavioral: Negative.    All other systems reviewed and are negative.      Past Medical History:   Diagnosis Date   • Abdominal pain    • Abdominal swelling    • Hoyos's disease    • Bipolar 1 disorder (HCC)    • Brain tumor (benign) (HCC)    • Brain tumor (HCC)     R Frontal Lobe per pt   • Brain tumor (HCC) 2014   • Constipation    • DDD (degenerative disc disease), cervical 05/29/2017   • DDD (degenerative disc disease), cervical    • Diarrhea    • Fibromyalgia    • IBS (irritable bowel syndrome)    • Migraine    • Nausea & vomiting    • PONV (postoperative nausea and vomiting)    • PTSD (post-traumatic stress disorder)    • Rectal bleeding        Allergies   Allergen Reactions   • Ativan [Lorazepam] Hallucinations     confusion   • Sulfa Antibiotics Shortness Of Breath and Swelling   • Sulfa Antibiotics Anaphylaxis   • Reglan [Metoclopramide] Angioedema   • Compazine [Prochlorperazine Edisylate] Hives   • Demerol [Meperidine] Hives   • Droperidol Itching   • Metoclopramide Swelling   • Toradol [Ketorolac Tromethamine] Hives and Itching   • Toradol [Ketorolac Tromethamine] Hives   • Zofran [Ondansetron Hcl] Rash   • Zosyn [Piperacillin Sod-Tazobactam So] Hives       Past Surgical History:   Procedure Laterality Date   • ANAL SCOPE N/A 7/28/2016    Procedure: ANAL SCOPE;  Surgeon: Kael Lopez MD;  Location: [facility];  Service:    • APPENDECTOMY     • COLONOSCOPY N/A 6/30/2016    Procedure: COLONOSCOPY  CPTCODE:89435;  Surgeon: Jose Antonoi Belle III, MD;  Location: [facility] OR;  Service:    • COLONOSCOPY N/A 7/7/2016     Procedure: COLONOSCOPY (84164) CPT;  Surgeon: Jose Antonio Belle III, MD;  Location: [facility] OR;  Service:    • CYSTOSCOPY RETROGRADE PYELOGRAM Bilateral 2017    Procedure: CYSTOSCOPY RETROGRADE PYELOGRAM;  Surgeon: Mu Blunt MD;  Location: [facility] OR;  Service:    • ENDOSCOPY N/A 2016    Procedure: ESOPHAGOGASTRODUODENOSCOPY WITH BIOPSY  CPTCODE:94703;  Surgeon: Jose Antonio Belle III, MD;  Location: [facility] OR;  Service:    • HEMORRHOIDECTOMY N/A 2016    Procedure: HEMORRHOID STAPLING;  Surgeon: Kael Lopez MD;  Location: [facility] OR;  Service:    • HYSTERECTOMY     • KNEE SURGERY     • LAPAROSCOPIC SALPINGOOPHERECTOMY     • PORTACATH PLACEMENT N/A 2017    Procedure: INSERTION OF PORTACATH;  Surgeon: Celso Arredondo MD;  Location: [facility] OR;  Service:    • SHOULDER SURGERY      3 times       Family History   Problem Relation Age of Onset   • Crohn's disease Other    • Hypertension Other    • Diabetes Other    • Irritable bowel syndrome Other    • No Known Problems Father    • No Known Problems Mother        Social History     Socioeconomic History   • Marital status:    Tobacco Use   • Smoking status: Former Smoker     Packs/day: 1.00     Years: 20.00     Pack years: 20.00     Quit date: 2015     Years since quittin.4   • Smokeless tobacco: Never Used   Vaping Use   • Vaping Use: Never used   Substance and Sexual Activity   • Alcohol use: No   • Drug use: Yes     Types: Marijuana     Comment: for pain   • Sexual activity: Defer     Birth control/protection: Surgical           Objective   Physical Exam  Vitals and nursing note reviewed.   Constitutional:       General: She is not in acute distress.     Appearance: Normal appearance. She is not diaphoretic.      Comments: Laying in a dark room   HENT:      Head: Normocephalic and atraumatic.      Right Ear: External ear normal.      Left Ear: External ear normal.      Nose: Nose normal.   Eyes:       Extraocular Movements: Extraocular movements intact.      Conjunctiva/sclera: Conjunctivae normal.      Pupils: Pupils are equal, round, and reactive to light.   Cardiovascular:      Rate and Rhythm: Normal rate and regular rhythm.      Pulses: Normal pulses.      Heart sounds: Normal heart sounds.   Pulmonary:      Effort: Pulmonary effort is normal.      Breath sounds: Normal breath sounds.   Abdominal:      General: Bowel sounds are normal.      Palpations: Abdomen is soft.   Musculoskeletal:         General: Normal range of motion.      Cervical back: Normal range of motion and neck supple. No rigidity or tenderness.   Lymphadenopathy:      Cervical: No cervical adenopathy.   Skin:     General: Skin is warm and dry.      Capillary Refill: Capillary refill takes less than 2 seconds.   Neurological:      General: No focal deficit present.      Mental Status: She is alert and oriented to person, place, and time.      Cranial Nerves: No cranial nerve deficit.      Sensory: No sensory deficit.      Motor: No weakness.      Coordination: Coordination normal.   Psychiatric:         Mood and Affect: Mood normal.         Procedures           ED Course  ED Course as of 04/26/22 1744   Tue Apr 26, 2022   1547 Patient reports her headache has improved.  She will be able to be discharged home to follow-up outpatient.  She will return to the ED if her symptoms change or worsen. [AH]      ED Course User Index  [AH] Kristin Shell PA                                                 [facility]  Number of Diagnoses or Management Options     Amount and/or Complexity of Data Reviewed  Clinical lab tests: reviewed    Patient Progress  Patient progress: improved      Final diagnoses:   Migraine without status migrainosus, not intractable, unspecified migraine type       ED Disposition  ED Disposition     ED Disposition   Discharge    Condition   Stable    Comment   --             Mabel Patterson, APRN  [address]  [place] [address]  [phone]    Schedule an appointment as soon as possible for a visit in 2 days           Medication List      No changes were made to your prescriptions during this visit.          Kristin Shell PA  04/26/22 5022

## 2022-05-12 ENCOUNTER — HOSPITAL ENCOUNTER (EMERGENCY)
Facility: HOSPITAL | Age: 47
Discharge: HOME | End: 2022-05-12
Attending: STUDENT IN AN ORGANIZED HEALTH CARE EDUCATION/TRAINING PROGRAM | Admitting: STUDENT IN AN ORGANIZED HEALTH CARE EDUCATION/TRAINING PROGRAM

## 2022-05-12 ENCOUNTER — APPOINTMENT (OUTPATIENT)
Dept: CT IMAGING | Facility: HOSPITAL | Age: 47
End: 2022-05-12

## 2022-05-12 VITALS
BODY MASS INDEX: 19.4 KG/M2 | TEMPERATURE: 100.2 F | RESPIRATION RATE: 16 BRPM | OXYGEN SATURATION: 98 % | SYSTOLIC BLOOD PRESSURE: 122 MMHG | WEIGHT: 128 LBS | HEART RATE: 97 BPM | DIASTOLIC BLOOD PRESSURE: 78 MMHG | HEIGHT: 68 IN

## 2022-05-12 DIAGNOSIS — G43.909 MIGRAINE WITHOUT STATUS MIGRAINOSUS, NOT INTRACTABLE, UNSPECIFIED MIGRAINE TYPE: Primary | ICD-10-CM

## 2022-05-12 LAB
ALBUMIN SERPL-MCNC: 3.83 G/DL (ref 3.5–5.2)
ALBUMIN/GLOB SERPL: 1.7 G/DL
ALP SERPL-CCNC: 67 U/L (ref 39–117)
ALT SERPL W P-5'-P-CCNC: 27 U/L (ref 1–33)
ANION GAP SERPL CALCULATED.3IONS-SCNC: 7.6 MMOL/L (ref 5–15)
AST SERPL-CCNC: 28 U/L (ref 1–32)
BASOPHILS # BLD AUTO: 0.01 10*3/MM3 (ref 0–0.2)
BASOPHILS NFR BLD AUTO: 0.2 % (ref 0–1.5)
BILIRUB SERPL-MCNC: 0.7 MG/DL (ref 0–1.2)
BUN SERPL-MCNC: 5 MG/DL (ref 6–20)
BUN/CREAT SERPL: 9.4 (ref 7–25)
CALCIUM SPEC-SCNC: 8.6 MG/DL (ref 8.6–10.5)
CHLORIDE SERPL-SCNC: 107 MMOL/L (ref 98–107)
CO2 SERPL-SCNC: 25.4 MMOL/L (ref 22–29)
CREAT SERPL-MCNC: 0.53 MG/DL (ref 0.57–1)
DEPRECATED RDW RBC AUTO: 45 FL (ref 37–54)
EGFRCR SERPLBLD CKD-EPI 2021: 115 ML/MIN/1.73
EOSINOPHIL # BLD AUTO: 0.1 10*3/MM3 (ref 0–0.4)
EOSINOPHIL NFR BLD AUTO: 2.3 % (ref 0.3–6.2)
ERYTHROCYTE [DISTWIDTH] IN BLOOD BY AUTOMATED COUNT: 13.3 % (ref 12.3–15.4)
GLOBULIN UR ELPH-MCNC: 2.3 GM/DL
GLUCOSE SERPL-MCNC: 58 MG/DL (ref 65–99)
HCT VFR BLD AUTO: 36.6 % (ref 34–46.6)
HGB BLD-MCNC: 12.4 G/DL (ref 12–15.9)
HOLD SPECIMEN: NORMAL
HOLD SPECIMEN: NORMAL
IMM GRANULOCYTES # BLD AUTO: 0.01 10*3/MM3 (ref 0–0.05)
IMM GRANULOCYTES NFR BLD AUTO: 0.2 % (ref 0–0.5)
LYMPHOCYTES # BLD AUTO: 1.62 10*3/MM3 (ref 0.7–3.1)
LYMPHOCYTES NFR BLD AUTO: 37.1 % (ref 19.6–45.3)
MCH RBC QN AUTO: 31.1 PG (ref 26.6–33)
MCHC RBC AUTO-ENTMCNC: 33.9 G/DL (ref 31.5–35.7)
MCV RBC AUTO: 91.7 FL (ref 79–97)
MONOCYTES # BLD AUTO: 0.32 10*3/MM3 (ref 0.1–0.9)
MONOCYTES NFR BLD AUTO: 7.3 % (ref 5–12)
NEUTROPHILS NFR BLD AUTO: 2.31 10*3/MM3 (ref 1.7–7)
NEUTROPHILS NFR BLD AUTO: 52.9 % (ref 42.7–76)
NRBC BLD AUTO-RTO: 0 /100 WBC (ref 0–0.2)
PLATELET # BLD AUTO: 114 10*3/MM3 (ref 140–450)
PMV BLD AUTO: 10.4 FL (ref 6–12)
POTASSIUM SERPL-SCNC: 4.1 MMOL/L (ref 3.5–5.2)
PROT SERPL-MCNC: 6.1 G/DL (ref 6–8.5)
RBC # BLD AUTO: 3.99 10*6/MM3 (ref 3.77–5.28)
SODIUM SERPL-SCNC: 140 MMOL/L (ref 136–145)
VALPROATE SERPL-MCNC: <2.8 MCG/ML (ref 50–125)
WBC NRBC COR # BLD: 4.37 10*3/MM3 (ref 3.4–10.8)
WHOLE BLOOD HOLD COAG: NORMAL
WHOLE BLOOD HOLD SPECIMEN: NORMAL

## 2022-05-12 PROCEDURE — 80053 COMPREHEN METABOLIC PANEL: CPT | Performed by: PHYSICIAN ASSISTANT

## 2022-05-12 PROCEDURE — 70450 CT HEAD/BRAIN W/O DYE: CPT | Performed by: RADIOLOGY

## 2022-05-12 PROCEDURE — 96375 TX/PRO/DX INJ NEW DRUG ADDON: CPT

## 2022-05-12 PROCEDURE — 96365 THER/PROPH/DIAG IV INF INIT: CPT

## 2022-05-12 PROCEDURE — 25010000002 DEXAMETHASONE SODIUM PHOSPHATE 20 MG/5ML SOLUTION: Performed by: PHYSICIAN ASSISTANT

## 2022-05-12 PROCEDURE — 25010000002 DIPHENHYDRAMINE PER 50 MG: Performed by: PHYSICIAN ASSISTANT

## 2022-05-12 PROCEDURE — 25010000002 BUTORPHANOL PER 1 MG: Performed by: STUDENT IN AN ORGANIZED HEALTH CARE EDUCATION/TRAINING PROGRAM

## 2022-05-12 PROCEDURE — 80164 ASSAY DIPROPYLACETIC ACD TOT: CPT | Performed by: PHYSICIAN ASSISTANT

## 2022-05-12 PROCEDURE — 99283 EMERGENCY DEPT VISIT LOW MDM: CPT

## 2022-05-12 PROCEDURE — 85025 COMPLETE CBC W/AUTO DIFF WBC: CPT | Performed by: PHYSICIAN ASSISTANT

## 2022-05-12 PROCEDURE — 70450 CT HEAD/BRAIN W/O DYE: CPT

## 2022-05-12 RX ORDER — SODIUM CHLORIDE 0.9 % (FLUSH) 0.9 %
10 SYRINGE (ML) INJECTION AS NEEDED
Status: DISCONTINUED | OUTPATIENT
Start: 2022-05-12 | End: 2022-05-12 | Stop reason: HOSPADM

## 2022-05-12 RX ORDER — DIPHENHYDRAMINE HYDROCHLORIDE 50 MG/ML
25 INJECTION INTRAMUSCULAR; INTRAVENOUS ONCE
Status: COMPLETED | OUTPATIENT
Start: 2022-05-12 | End: 2022-05-12

## 2022-05-12 RX ORDER — DIVALPROEX SODIUM 250 MG/1
500 TABLET, DELAYED RELEASE ORAL ONCE
Status: COMPLETED | OUTPATIENT
Start: 2022-05-12 | End: 2022-05-12

## 2022-05-12 RX ADMIN — DEXAMETHASONE SODIUM PHOSPHATE 10 MG: 4 INJECTION, SOLUTION INTRAMUSCULAR; INTRAVENOUS at 17:10

## 2022-05-12 RX ADMIN — SODIUM CHLORIDE 1000 ML: 9 INJECTION, SOLUTION INTRAVENOUS at 16:33

## 2022-05-12 RX ADMIN — DIPHENHYDRAMINE HYDROCHLORIDE 25 MG: 50 INJECTION INTRAMUSCULAR; INTRAVENOUS at 16:56

## 2022-05-12 RX ADMIN — BUTORPHANOL TARTRATE 2 MG: 2 INJECTION, SOLUTION INTRAMUSCULAR; INTRAVENOUS at 16:33

## 2022-05-12 RX ADMIN — DIVALPROEX SODIUM 500 MG: 250 TABLET, DELAYED RELEASE ORAL at 17:30

## 2022-05-12 NOTE — ED PROVIDER NOTES
Subjective   48 yo female patient presents to the ED with complaints of migraine headache as she nromally has with associated nausea and vomiting. Patient states she has had this migraine for 3 days with no improvement. Patient denies any alleviating factors. Patient reports that light and movement worsens her HA.        History provided by:  Patient   used: No        Review of Systems   Constitutional: Negative.    Eyes: Negative.    Respiratory: Negative.    Cardiovascular: Negative.    Gastrointestinal: Negative.    Endocrine: Negative.    Genitourinary: Negative.    Musculoskeletal: Negative.    Skin: Negative.    Allergic/Immunologic: Negative.    Neurological: Positive for headaches.   Hematological: Negative.    Psychiatric/Behavioral: Negative.    All other systems reviewed and are negative.      Past Medical History:   Diagnosis Date   • Abdominal pain    • Abdominal swelling    • Hoyos's disease    • Bipolar 1 disorder (HCC)    • Brain tumor (benign) (HCC)    • Brain tumor (HCC)     R Frontal Lobe per pt   • Brain tumor (HCC) 2014   • Constipation    • DDD (degenerative disc disease), cervical 05/29/2017   • DDD (degenerative disc disease), cervical    • Diarrhea    • Fibromyalgia    • IBS (irritable bowel syndrome)    • Migraine    • Nausea & vomiting    • PONV (postoperative nausea and vomiting)    • PTSD (post-traumatic stress disorder)    • Rectal bleeding        Allergies   Allergen Reactions   • Ativan [Lorazepam] Hallucinations     confusion   • Sulfa Antibiotics Shortness Of Breath and Swelling   • Sulfa Antibiotics Anaphylaxis   • Reglan [Metoclopramide] Angioedema   • Compazine [Prochlorperazine Edisylate] Hives   • Demerol [Meperidine] Hives   • Droperidol Itching   • Metoclopramide Swelling   • Toradol [Ketorolac Tromethamine] Hives and Itching   • Toradol [Ketorolac Tromethamine] Hives   • Zofran [Ondansetron Hcl] Rash   • Zosyn [Piperacillin Sod-Tazobactam So] Hives        Past Surgical History:   Procedure Laterality Date   • ANAL SCOPE N/A 2016    Procedure: ANAL SCOPE;  Surgeon: Kael Lopez MD;  Location: [facility] OR;  Service:    • APPENDECTOMY     • COLONOSCOPY N/A 2016    Procedure: COLONOSCOPY  CPTCODE:21539;  Surgeon: Jose Antonio Belle III, MD;  Location: [facility] OR;  Service:    • COLONOSCOPY N/A 2016    Procedure: COLONOSCOPY (41807) CPT;  Surgeon: Jose Antonio Belle III, MD;  Location: [facility] OR;  Service:    • CYSTOSCOPY RETROGRADE PYELOGRAM Bilateral 2017    Procedure: CYSTOSCOPY RETROGRADE PYELOGRAM;  Surgeon: Mu Blunt MD;  Location: [facility] OR;  Service:    • ENDOSCOPY N/A 2016    Procedure: ESOPHAGOGASTRODUODENOSCOPY WITH BIOPSY  CPTCODE:50476;  Surgeon: Jose Antonio Belle III, MD;  Location: [facility] OR;  Service:    • HEMORRHOIDECTOMY N/A 2016    Procedure: HEMORRHOID STAPLING;  Surgeon: Kael Lopez MD;  Location: [facility] OR;  Service:    • HYSTERECTOMY     • KNEE SURGERY     • LAPAROSCOPIC SALPINGOOPHERECTOMY     • PORTACATH PLACEMENT N/A 2017    Procedure: INSERTION OF PORTACATH;  Surgeon: Celso Arredondo MD;  Location: [facility] OR;  Service:    • SHOULDER SURGERY      3 times       Family History   Problem Relation Age of Onset   • Crohn's disease Other    • Hypertension Other    • Diabetes Other    • Irritable bowel syndrome Other    • No Known Problems Father    • No Known Problems Mother        Social History     Socioeconomic History   • Marital status:    Tobacco Use   • Smoking status: Former Smoker     Packs/day: 1.00     Years: 20.00     Pack years: 20.00     Quit date: 2015     Years since quittin.5   • Smokeless tobacco: Never Used   Vaping Use   • Vaping Use: Never used   Substance and Sexual Activity   • Alcohol use: No   • Drug use: Yes     Types: Marijuana     Comment: for pain   • Sexual activity: Defer     Birth control/protection: Surgical           Objective   Physical  Exam  Vitals and nursing note reviewed.   Constitutional:       Appearance: Normal appearance. She is normal weight.   HENT:      Head: Normocephalic and atraumatic.      Right Ear: External ear normal.      Left Ear: External ear normal.      Nose: Nose normal.      Mouth/Throat:      Mouth: Mucous membranes are moist.      Pharynx: Oropharynx is clear.   Eyes:      Extraocular Movements: Extraocular movements intact.      Conjunctiva/sclera: Conjunctivae normal.      Pupils: Pupils are equal, round, and reactive to light.   Cardiovascular:      Rate and Rhythm: Normal rate and regular rhythm.      Pulses: Normal pulses.      Heart sounds: Normal heart sounds.   Pulmonary:      Effort: Pulmonary effort is normal.      Breath sounds: Normal breath sounds.   Abdominal:      General: Abdomen is flat. Bowel sounds are normal.      Palpations: Abdomen is soft.   Musculoskeletal:         General: Normal range of motion.      Cervical back: Normal range of motion and neck supple.   Skin:     General: Skin is warm and dry.      Capillary Refill: Capillary refill takes less than 2 seconds.   Neurological:      General: No focal deficit present.      Mental Status: She is alert and oriented to person, place, and time. Mental status is at baseline.      Comments: NIH 0  GCS 15     Psychiatric:         Mood and Affect: Mood normal.         Behavior: Behavior normal.         Thought Content: Thought content normal.         Judgment: Judgment normal.         Procedures           ED Course  ED Course as of 05/12/22 1721   Thu May 12, 2022   1631 CT Head Without Contrast  IMPRESSION:  1.  Again there is a 1.4 cm partially ossified, probable meningioma  overlying the right frontal region.  2.  No acute intracranial abnormality.     This report was finalized on 5/12/2022 4:07 PM by Dr. Rigoberto Huddleston MD.             Specimen Collected: 05/12/22 16:05 Last Resulted: 05/12/22 16:07           [ML]   1658 Valproic Acid(!): <2.8 [ML]    1700 Valproic Acid(!): <2.8  Pt states she quit taking her depakote on her own. She has prescription at home and will start back. She will f/u with PCP.  [ML]      ED Course User Index  [ML] Fatimah Ortiz PA                                                 MDM  Number of Diagnoses or Management Options     Amount and/or Complexity of Data Reviewed  Clinical lab tests: ordered and reviewed  Tests in the radiology section of CPT®: ordered and reviewed    Risk of Complications, Morbidity, and/or Mortality  Presenting problems: low  Diagnostic procedures: low  Management options: low    Patient Progress  Patient progress: improved      Final diagnoses:   Migraine without status migrainosus, not intractable, unspecified migraine type       ED Disposition  ED Disposition     ED Disposition   Discharge    Condition   Stable    Comment   --             Mabel Patterson, APRN  [address] [facility] [address] [phone]    Schedule an appointment as soon as possible for a visit in 1 day           Medication List      No changes were made to your prescriptions during this visit.          Fatimah Ortiz PA  05/12/22 1702       Fatimah Ortiz PA  05/12/22 172

## 2022-05-17 ENCOUNTER — OFFICE VISIT (OUTPATIENT)
Dept: UROLOGY | Facility: CLINIC | Age: 47
End: 2022-05-17

## 2022-05-17 VITALS — WEIGHT: 128 LBS | HEIGHT: 68 IN | BODY MASS INDEX: 19.4 KG/M2

## 2022-05-17 DIAGNOSIS — N23 RENAL COLIC: ICD-10-CM

## 2022-05-17 DIAGNOSIS — N35.028 OTHER POST-TRAUMATIC URETHRAL STRICTURE, FEMALE: Primary | ICD-10-CM

## 2022-05-17 PROCEDURE — 53661 DILATION OF URETHRA: CPT | Performed by: UROLOGY

## 2022-05-17 PROCEDURE — 96372 THER/PROPH/DIAG INJ SC/IM: CPT | Performed by: UROLOGY

## 2022-05-17 RX ORDER — GENTAMICIN SULFATE 40 MG/ML
80 INJECTION, SOLUTION INTRAMUSCULAR; INTRAVENOUS ONCE
Status: COMPLETED | OUTPATIENT
Start: 2022-05-17 | End: 2022-05-17

## 2022-05-17 RX ORDER — OXYCODONE AND ACETAMINOPHEN 10; 325 MG/1; MG/1
1 TABLET ORAL EVERY 6 HOURS PRN
Qty: 14 TABLET | Refills: 0 | Status: SHIPPED | OUTPATIENT
Start: 2022-05-17 | End: 2022-06-16 | Stop reason: SDUPTHER

## 2022-05-17 RX ADMIN — GENTAMICIN SULFATE 80 MG: 40 INJECTION, SOLUTION INTRAMUSCULAR; INTRAVENOUS at 11:12

## 2022-05-17 NOTE — PROGRESS NOTES
Chief Complaint:          Chief Complaint   Patient presents with   • dilation        HPI:   47 y.o. female here for urethral dilation severe pain recurrent hematuria      Past Medical History:        Past Medical History:   Diagnosis Date   • Abdominal pain    • Abdominal swelling    • Hoyos's disease    • Bipolar 1 disorder (HCC)    • Brain tumor (benign) (HCC)    • Brain tumor (HCC)     R Frontal Lobe per pt   • Brain tumor (HCC) 2014   • Constipation    • DDD (degenerative disc disease), cervical 05/29/2017   • DDD (degenerative disc disease), cervical    • Diarrhea    • Fibromyalgia    • IBS (irritable bowel syndrome)    • Migraine    • Nausea & vomiting    • PONV (postoperative nausea and vomiting)    • PTSD (post-traumatic stress disorder)    • Rectal bleeding          Current Meds:     Current Outpatient Medications   Medication Sig Dispense Refill   • oxyCODONE-acetaminophen (Percocet)  MG per tablet Take 1 tablet by mouth Every 6 (Six) Hours As Needed for Moderate Pain . 14 tablet 0   • albuterol (PROVENTIL HFA;VENTOLIN HFA) 108 (90 Base) MCG/ACT inhaler Inhale 2 puffs 2 (Two) Times a Day.     • Azelastine HCl 137 MCG/SPRAY solution 1 spray into each nostril As Needed (Allergies).     • busPIRone (BUSPAR) 15 MG tablet Take 15 mg by mouth 3 (three) times a day        • ciprofloxacin (CIPRO) 500 MG tablet Take 1 tablet by mouth 2 (Two) Times a Day. 19 tablet 0   • cyclobenzaprine (FLEXERIL) 5 MG tablet      • diclofenac (VOLTAREN) 1 % gel gel      • divalproex (DEPAKOTE) 500 MG DR tablet Take 1 tablet by mouth 3 (Three) Times a Day. 90 tablet 2   • docusate sodium (COLACE) 100 MG capsule Take 1 capsule by mouth 2 (Two) Times a Day. 20 capsule 0   • docusate sodium (COLACE) 100 MG capsule Take 1 capsule by mouth 2 (Two) Times a Day As Needed for Constipation. 60 capsule 0   • fluticasone (VERAMYST) 27.5 MCG/SPRAY nasal spray 2 sprays into each nostril Daily.     • metroNIDAZOLE (FLAGYL) 500 MG tablet  Take 1 tablet by mouth 3 (Three) Times a Day. 30 tablet 0   • montelukast (SINGULAIR) 10 MG tablet Take 10 mg by mouth Every Night.     • oxyCODONE-acetaminophen (PERCOCET) 5-325 MG per tablet Take 1 tablet by mouth Every 8 (Eight) Hours As Needed for Moderate Pain . 4 tablet 0   • pantoprazole (PROTONIX) 40 MG EC tablet Take 40 mg by mouth 2 (Two) Times a Day As Needed.     • phenazopyridine (PYRIDIUM) 100 MG tablet      • polyethylene glycol (MIRALAX) 17 GM/SCOOP powder Take 17 g by mouth Daily. 225 g 0   • predniSONE (DELTASONE) 50 MG tablet Take 1 tablet by mouth Daily. 5 tablet 0   • promethazine (PHENERGAN) 25 MG tablet Take 1 tablet by mouth Every 6 (Six) Hours As Needed for Nausea or Vomiting. 15 tablet 0   • promethazine (PHENERGAN) 25 MG tablet Take 1 tablet by mouth Every 6 (Six) Hours As Needed for Nausea or Vomiting. 21 tablet 2   • promethazine (PHENERGAN) 25 MG tablet Take 1 tablet by mouth Every 6 (Six) Hours As Needed for Nausea or Vomiting. 21 tablet 2   • sertraline (ZOLOFT) 100 MG tablet Take 100 mg by mouth 2 (Two) Times a Day.     • SUMAtriptan (IMITREX) 6 MG/0.5ML injection Inject 6 mg under the skin 1 (One) Time.     • tiZANidine (ZANAFLEX) 4 MG tablet      • traMADol (ULTRAM) 50 MG tablet        No current facility-administered medications for this visit.        Allergies:      Allergies   Allergen Reactions   • Ativan [Lorazepam] Hallucinations     confusion   • Sulfa Antibiotics Shortness Of Breath and Swelling   • Sulfa Antibiotics Anaphylaxis   • Reglan [Metoclopramide] Angioedema   • Compazine [Prochlorperazine Edisylate] Hives   • Demerol [Meperidine] Hives   • Droperidol Itching   • Metoclopramide Swelling   • Toradol [Ketorolac Tromethamine] Hives and Itching   • Toradol [Ketorolac Tromethamine] Hives   • Zofran [Ondansetron Hcl] Rash   • Zosyn [Piperacillin Sod-Tazobactam So] Hives        Past Surgical History:     Past Surgical History:   Procedure Laterality Date   • ANAL SCOPE  N/A 2016    Procedure: ANAL SCOPE;  Surgeon: Kael Lopez MD;  Location: [facility] OR;  Service:    • APPENDECTOMY     • COLONOSCOPY N/A 2016    Procedure: COLONOSCOPY  CPTCODE:28149;  Surgeon: Jose Antonio Belle III, MD;  Location: [facility] OR;  Service:    • COLONOSCOPY N/A 2016    Procedure: COLONOSCOPY (24383) CPT;  Surgeon: Jose Antonio Belle III, MD;  Location: [facility] OR;  Service:    • CYSTOSCOPY RETROGRADE PYELOGRAM Bilateral 2017    Procedure: CYSTOSCOPY RETROGRADE PYELOGRAM;  Surgeon: Mu Blunt MD;  Location: [facility] OR;  Service:    • ENDOSCOPY N/A 2016    Procedure: ESOPHAGOGASTRODUODENOSCOPY WITH BIOPSY  CPTCODE:48759;  Surgeon: Jose Antonio Belle III, MD;  Location: [facility] OR;  Service:    • HEMORRHOIDECTOMY N/A 2016    Procedure: HEMORRHOID STAPLING;  Surgeon: Kael Lopez MD;  Location: [facility] OR;  Service:    • HYSTERECTOMY     • KNEE SURGERY     • LAPAROSCOPIC SALPINGOOPHERECTOMY     • PORTACATH PLACEMENT N/A 2017    Procedure: INSERTION OF PORTACATH;  Surgeon: Celso Arredondo MD;  Location: [facility] OR;  Service:    • SHOULDER SURGERY      3 times         Social History:     Social History     Socioeconomic History   • Marital status:    Tobacco Use   • Smoking status: Former Smoker     Packs/day: 1.00     Years: 20.00     Pack years: 20.00     Quit date: 2015     Years since quittin.5   • Smokeless tobacco: Never Used   Vaping Use   • Vaping Use: Never used   Substance and Sexual Activity   • Alcohol use: No   • Drug use: Yes     Types: Marijuana     Comment: for pain   • Sexual activity: Defer     Birth control/protection: Surgical       Family History:     Family History   Problem Relation Age of Onset   • Crohn's disease Other    • Hypertension Other    • Diabetes Other    • Irritable bowel syndrome Other    • No Known Problems Father    • No Known Problems Mother        Review of Systems:     Review of Systems    Constitutional: Negative.  Negative for activity change, appetite change, chills, diaphoresis, fatigue and unexpected weight change.   HENT: Negative for congestion, dental problem, drooling, ear discharge, ear pain, facial swelling, hearing loss, mouth sores, nosebleeds, postnasal drip, rhinorrhea, sinus pressure, sneezing, sore throat, tinnitus, trouble swallowing and voice change.    Eyes: Negative.  Negative for photophobia, pain, discharge, redness, itching and visual disturbance.   Respiratory: Negative.  Negative for apnea, cough, choking, chest tightness, shortness of breath, wheezing and stridor.    Cardiovascular: Negative.  Negative for chest pain, palpitations and leg swelling.   Gastrointestinal: Negative.  Negative for abdominal distention, abdominal pain, anal bleeding, blood in stool, constipation, diarrhea, nausea, rectal pain and vomiting.   Endocrine: Negative.  Negative for cold intolerance, heat intolerance, polydipsia, polyphagia and polyuria.   Genitourinary: Positive for difficulty urinating.   Musculoskeletal: Negative.  Negative for arthralgias, back pain, gait problem, joint swelling, myalgias, neck pain and neck stiffness.   Skin: Negative.  Negative for color change, pallor, rash and wound.   Allergic/Immunologic: Negative.  Negative for environmental allergies, food allergies and immunocompromised state.   Neurological: Negative.  Negative for dizziness, tremors, seizures, syncope, facial asymmetry, speech difficulty, weakness, light-headedness, numbness and headaches.   Hematological: Negative.  Negative for adenopathy. Does not bruise/bleed easily.   Psychiatric/Behavioral: Negative for agitation, behavioral problems, confusion, decreased concentration, dysphoric mood, hallucinations, self-injury, sleep disturbance and suicidal ideas. The patient is not nervous/anxious and is not hyperactive.    All other systems reviewed and are negative.      Physical Exam:     Physical  Exam  Constitutional:       Appearance: She is well-developed.   HENT:      Head: Normocephalic and atraumatic.      Right Ear: External ear normal.      Left Ear: External ear normal.   Eyes:      Conjunctiva/sclera: Conjunctivae normal.      Pupils: Pupils are equal, round, and reactive to light.   Cardiovascular:      Rate and Rhythm: Normal rate and regular rhythm.      Heart sounds: Normal heart sounds.   Pulmonary:      Effort: Pulmonary effort is normal.      Breath sounds: Normal breath sounds.   Abdominal:      General: Bowel sounds are normal. There is no distension.      Palpations: Abdomen is soft. There is no mass.      Tenderness: There is no abdominal tenderness. There is no guarding or rebound.   Genitourinary:     General: Normal vulva.      Vagina: No vaginal discharge.   Musculoskeletal:         General: Normal range of motion.   Skin:     General: Skin is warm and dry.   Neurological:      Mental Status: She is alert.      Deep Tendon Reflexes: Reflexes are normal and symmetric.   Psychiatric:         Behavior: Behavior normal.         Thought Content: Thought content normal.         Judgment: Judgment normal.         I have reviewed the following portions of the patient's history: Allergies, current medications, past family history, past medical history, past social history, past surgical history, problem list, and ROS and confirm it is accurate.      Procedure:   Urethral dilation-after an appropriate informed consent, the patient was brought to the procedure suite.  The urethra was gently anesthetized with 10 mL of 2% viscous Xylocaine jelly.  After an adequate period of topical anesthesia I went ahead and  dilated with [place] sounds from 16 to 28 E[place] sequentially without complication. The patient was given gentamicin as prophylaxis with 80 mg.    Assessment/Plan:   Posttraumatic urethral stricture status post dilation  Narcotic pain medication-patient has significant acute pain that I  believe would be an indication for the use of narcotic pain medication.  I discussed the significant risks of pain medication and the fact that this will be a short only option and I will give her no more than a three-day supply of pain medication, I will not plan long-term medication, and that this will be sent to a pain clinic if it at all becomes necessary.  We discussed signing a pain medication agreement and the fact that we're going to run a state [place] review to be sure the patient is not getting pain medication from elsewhere.  If this is the case, we will not give pain medication as part of the patient's treatment plan of there being prescribed a controlled substance with potential for abuse.  This patient has been well aware of the appropriate dose of such medications including the risks for somnolence, limited ability to drive and/or safety and the significant potential for overdose.  It has been made clear that these medications are for the prescribed patient only without concomitant use of alcohol or other substance unless prescribed by the medical provider.  Has completed prescribing agreement detailing the terms of continue prescribing him a controlled substance including monitoring Luis Alberto reports, the possibility of urine drug screens, and pill counts.  The patient is aware that we review LUIS ALBERTO reports on a regular basis and scan them into the chart.  History and physical examination exhibited continued safe and appropriate use of controlled substances. We also discussed the fact that the new [place] legislation allows only a three-day prescription for pain medication.  In this situation he will be referred to a chronic pain clinic.              This document has been electronically signed by VERENICE FINK MD May 17, 2022 08:10 EDT

## 2022-05-25 ENCOUNTER — TELEPHONE (OUTPATIENT)
Dept: UROLOGY | Facility: CLINIC | Age: 47
End: 2022-05-25

## 2022-05-25 DIAGNOSIS — N23 RENAL COLIC: ICD-10-CM

## 2022-05-25 RX ORDER — PROMETHAZINE HYDROCHLORIDE 25 MG/1
25 TABLET ORAL EVERY 6 HOURS PRN
Qty: 21 TABLET | Refills: 2 | Status: SHIPPED | OUTPATIENT
Start: 2022-05-25 | End: 2022-07-25 | Stop reason: SDUPTHER

## 2022-06-16 ENCOUNTER — OFFICE VISIT (OUTPATIENT)
Dept: UROLOGY | Facility: CLINIC | Age: 47
End: 2022-06-16

## 2022-06-16 VITALS — WEIGHT: 128 LBS | HEIGHT: 68 IN | BODY MASS INDEX: 19.4 KG/M2

## 2022-06-16 DIAGNOSIS — F31.9 BIPOLAR 1 DISORDER: Primary | ICD-10-CM

## 2022-06-16 DIAGNOSIS — N23 RENAL COLIC: ICD-10-CM

## 2022-06-16 DIAGNOSIS — N35.028 OTHER POST-TRAUMATIC URETHRAL STRICTURE, FEMALE: ICD-10-CM

## 2022-06-16 DIAGNOSIS — Z79.2 PROPHYLACTIC ANTIBIOTIC: ICD-10-CM

## 2022-06-16 PROCEDURE — 53661 DILATION OF URETHRA: CPT | Performed by: UROLOGY

## 2022-06-16 PROCEDURE — 96372 THER/PROPH/DIAG INJ SC/IM: CPT | Performed by: UROLOGY

## 2022-06-16 RX ORDER — GENTAMICIN SULFATE 40 MG/ML
80 INJECTION, SOLUTION INTRAMUSCULAR; INTRAVENOUS ONCE
Status: COMPLETED | OUTPATIENT
Start: 2022-06-16 | End: 2022-06-16

## 2022-06-16 RX ORDER — MEGESTROL ACETATE 20 MG/1
20 TABLET ORAL DAILY
Qty: 30 TABLET | Refills: 3 | Status: SHIPPED | OUTPATIENT
Start: 2022-06-16 | End: 2022-09-13 | Stop reason: SDUPTHER

## 2022-06-16 RX ORDER — OXYCODONE AND ACETAMINOPHEN 10; 325 MG/1; MG/1
1 TABLET ORAL EVERY 6 HOURS PRN
Qty: 14 TABLET | Refills: 0 | Status: SHIPPED | OUTPATIENT
Start: 2022-06-16 | End: 2022-07-25 | Stop reason: SDUPTHER

## 2022-06-16 RX ADMIN — GENTAMICIN SULFATE 80 MG: 40 INJECTION, SOLUTION INTRAMUSCULAR; INTRAVENOUS at 12:59

## 2022-06-29 ENCOUNTER — HOSPITAL ENCOUNTER (EMERGENCY)
Facility: HOSPITAL | Age: 47
Discharge: HOME | End: 2022-06-29
Attending: EMERGENCY MEDICINE | Admitting: EMERGENCY MEDICINE

## 2022-06-29 VITALS
SYSTOLIC BLOOD PRESSURE: 116 MMHG | HEART RATE: 86 BPM | TEMPERATURE: 98.4 F | RESPIRATION RATE: 16 BRPM | OXYGEN SATURATION: 99 % | DIASTOLIC BLOOD PRESSURE: 81 MMHG | HEIGHT: 68 IN | BODY MASS INDEX: 17.88 KG/M2 | WEIGHT: 118 LBS

## 2022-06-29 DIAGNOSIS — G43.009 MIGRAINE WITHOUT AURA AND WITHOUT STATUS MIGRAINOSUS, NOT INTRACTABLE: Primary | ICD-10-CM

## 2022-06-29 PROCEDURE — 96372 THER/PROPH/DIAG INJ SC/IM: CPT

## 2022-06-29 PROCEDURE — 25010000002 DIPHENHYDRAMINE PER 50 MG: Performed by: EMERGENCY MEDICINE

## 2022-06-29 PROCEDURE — 25010000002 HYDROMORPHONE PER 4 MG: Performed by: EMERGENCY MEDICINE

## 2022-06-29 PROCEDURE — 99283 EMERGENCY DEPT VISIT LOW MDM: CPT

## 2022-06-29 PROCEDURE — 25010000002 DEXAMETHASONE SODIUM PHOSPHATE 10 MG/ML SOLUTION: Performed by: EMERGENCY MEDICINE

## 2022-06-29 RX ORDER — DEXAMETHASONE SODIUM PHOSPHATE 10 MG/ML
10 INJECTION, SOLUTION INTRAMUSCULAR; INTRAVENOUS ONCE
Status: COMPLETED | OUTPATIENT
Start: 2022-06-29 | End: 2022-06-29

## 2022-06-29 RX ORDER — DIPHENHYDRAMINE HYDROCHLORIDE 50 MG/ML
50 INJECTION INTRAMUSCULAR; INTRAVENOUS ONCE
Status: COMPLETED | OUTPATIENT
Start: 2022-06-29 | End: 2022-06-29

## 2022-06-29 RX ORDER — DIPHENHYDRAMINE HYDROCHLORIDE 50 MG/ML
25 INJECTION INTRAMUSCULAR; INTRAVENOUS ONCE
Status: DISCONTINUED | OUTPATIENT
Start: 2022-06-29 | End: 2022-06-29

## 2022-06-29 RX ORDER — DEXAMETHASONE SODIUM PHOSPHATE 4 MG/ML
8 INJECTION, SOLUTION INTRA-ARTICULAR; INTRALESIONAL; INTRAMUSCULAR; INTRAVENOUS; SOFT TISSUE ONCE
Status: DISCONTINUED | OUTPATIENT
Start: 2022-06-29 | End: 2022-06-29

## 2022-06-29 RX ORDER — HYDROMORPHONE HCL 110MG/55ML
1.5 PATIENT CONTROLLED ANALGESIA SYRINGE INTRAVENOUS ONCE
Status: COMPLETED | OUTPATIENT
Start: 2022-06-29 | End: 2022-06-29

## 2022-06-29 RX ADMIN — HYDROMORPHONE HYDROCHLORIDE 1.5 MG: 2 INJECTION, SOLUTION INTRAMUSCULAR; INTRAVENOUS; SUBCUTANEOUS at 20:18

## 2022-06-29 RX ADMIN — DIPHENHYDRAMINE HYDROCHLORIDE 50 MG: 50 INJECTION INTRAMUSCULAR; INTRAVENOUS at 20:18

## 2022-06-29 RX ADMIN — DEXAMETHASONE SODIUM PHOSPHATE 10 MG: 10 INJECTION INTRAMUSCULAR; INTRAVENOUS at 20:18

## 2022-07-12 ENCOUNTER — APPOINTMENT (OUTPATIENT)
Dept: CT IMAGING | Facility: HOSPITAL | Age: 47
End: 2022-07-12

## 2022-07-12 ENCOUNTER — HOSPITAL ENCOUNTER (EMERGENCY)
Facility: HOSPITAL | Age: 47
Discharge: HOME | End: 2022-07-12
Attending: STUDENT IN AN ORGANIZED HEALTH CARE EDUCATION/TRAINING PROGRAM | Admitting: STUDENT IN AN ORGANIZED HEALTH CARE EDUCATION/TRAINING PROGRAM

## 2022-07-12 VITALS
DIASTOLIC BLOOD PRESSURE: 62 MMHG | OXYGEN SATURATION: 97 % | HEIGHT: 68 IN | SYSTOLIC BLOOD PRESSURE: 114 MMHG | HEART RATE: 112 BPM | TEMPERATURE: 97.9 F | RESPIRATION RATE: 18 BRPM | BODY MASS INDEX: 18.49 KG/M2 | WEIGHT: 122 LBS

## 2022-07-12 DIAGNOSIS — R51.9 ACUTE NONINTRACTABLE HEADACHE, UNSPECIFIED HEADACHE TYPE: Primary | ICD-10-CM

## 2022-07-12 LAB
ALBUMIN SERPL-MCNC: 4.35 G/DL (ref 3.5–5.2)
ALBUMIN/GLOB SERPL: 2 G/DL
ALP SERPL-CCNC: 60 U/L (ref 39–117)
ALT SERPL W P-5'-P-CCNC: 34 U/L (ref 1–33)
AMPHET+METHAMPHET UR QL: POSITIVE
AMPHETAMINES UR QL: POSITIVE
ANION GAP SERPL CALCULATED.3IONS-SCNC: 9.6 MMOL/L (ref 5–15)
AST SERPL-CCNC: 28 U/L (ref 1–32)
BARBITURATES UR QL SCN: NEGATIVE
BASOPHILS # BLD AUTO: 0.02 10*3/MM3 (ref 0–0.2)
BASOPHILS NFR BLD AUTO: 0.4 % (ref 0–1.5)
BENZODIAZ UR QL SCN: NEGATIVE
BILIRUB SERPL-MCNC: 1.2 MG/DL (ref 0–1.2)
BILIRUB UR QL STRIP: NEGATIVE
BUN SERPL-MCNC: 5 MG/DL (ref 6–20)
BUN/CREAT SERPL: 8.3 (ref 7–25)
BUPRENORPHINE SERPL-MCNC: NEGATIVE NG/ML
CALCIUM SPEC-SCNC: 9.2 MG/DL (ref 8.6–10.5)
CANNABINOIDS SERPL QL: POSITIVE
CHLORIDE SERPL-SCNC: 107 MMOL/L (ref 98–107)
CLARITY UR: CLEAR
CO2 SERPL-SCNC: 24.4 MMOL/L (ref 22–29)
COCAINE UR QL: NEGATIVE
COLOR UR: ABNORMAL
CREAT SERPL-MCNC: 0.6 MG/DL (ref 0.57–1)
CRP SERPL-MCNC: <0.3 MG/DL (ref 0–0.5)
DEPRECATED RDW RBC AUTO: 41.7 FL (ref 37–54)
EGFRCR SERPLBLD CKD-EPI 2021: 111.6 ML/MIN/1.73
EOSINOPHIL # BLD AUTO: 0.09 10*3/MM3 (ref 0–0.4)
EOSINOPHIL NFR BLD AUTO: 1.8 % (ref 0.3–6.2)
ERYTHROCYTE [DISTWIDTH] IN BLOOD BY AUTOMATED COUNT: 12.7 % (ref 12.3–15.4)
ERYTHROCYTE [SEDIMENTATION RATE] IN BLOOD: 3 MM/HR (ref 0–20)
GLOBULIN UR ELPH-MCNC: 2.2 GM/DL
GLUCOSE SERPL-MCNC: 80 MG/DL (ref 65–99)
GLUCOSE UR STRIP-MCNC: NEGATIVE MG/DL
HCT VFR BLD AUTO: 41.9 % (ref 34–46.6)
HGB BLD-MCNC: 14.4 G/DL (ref 12–15.9)
HGB UR QL STRIP.AUTO: NEGATIVE
IMM GRANULOCYTES # BLD AUTO: 0 10*3/MM3 (ref 0–0.05)
IMM GRANULOCYTES NFR BLD AUTO: 0 % (ref 0–0.5)
KETONES UR QL STRIP: NEGATIVE
LEUKOCYTE ESTERASE UR QL STRIP.AUTO: NEGATIVE
LYMPHOCYTES # BLD AUTO: 1.76 10*3/MM3 (ref 0.7–3.1)
LYMPHOCYTES NFR BLD AUTO: 35 % (ref 19.6–45.3)
MCH RBC QN AUTO: 31 PG (ref 26.6–33)
MCHC RBC AUTO-ENTMCNC: 34.4 G/DL (ref 31.5–35.7)
MCV RBC AUTO: 90.3 FL (ref 79–97)
METHADONE UR QL SCN: NEGATIVE
MONOCYTES # BLD AUTO: 0.41 10*3/MM3 (ref 0.1–0.9)
MONOCYTES NFR BLD AUTO: 8.2 % (ref 5–12)
NEUTROPHILS NFR BLD AUTO: 2.75 10*3/MM3 (ref 1.7–7)
NEUTROPHILS NFR BLD AUTO: 54.6 % (ref 42.7–76)
NITRITE UR QL STRIP: NEGATIVE
NRBC BLD AUTO-RTO: 0 /100 WBC (ref 0–0.2)
OPIATES UR QL: POSITIVE
OXYCODONE UR QL SCN: NEGATIVE
PCP UR QL SCN: NEGATIVE
PH UR STRIP.AUTO: 6.5 [PH] (ref 5–8)
PLATELET # BLD AUTO: 145 10*3/MM3 (ref 140–450)
PMV BLD AUTO: 10.8 FL (ref 6–12)
POTASSIUM SERPL-SCNC: 4 MMOL/L (ref 3.5–5.2)
PROPOXYPH UR QL: NEGATIVE
PROT SERPL-MCNC: 6.5 G/DL (ref 6–8.5)
PROT UR QL STRIP: NEGATIVE
RBC # BLD AUTO: 4.64 10*6/MM3 (ref 3.77–5.28)
SODIUM SERPL-SCNC: 141 MMOL/L (ref 136–145)
SP GR UR STRIP: <=1.005 (ref 1–1.03)
TRICYCLICS UR QL SCN: NEGATIVE
UROBILINOGEN UR QL STRIP: ABNORMAL
WBC NRBC COR # BLD: 5.03 10*3/MM3 (ref 3.4–10.8)

## 2022-07-12 PROCEDURE — 25010000002 BUTORPHANOL PER 1 MG: Performed by: STUDENT IN AN ORGANIZED HEALTH CARE EDUCATION/TRAINING PROGRAM

## 2022-07-12 PROCEDURE — 70450 CT HEAD/BRAIN W/O DYE: CPT

## 2022-07-12 PROCEDURE — 96375 TX/PRO/DX INJ NEW DRUG ADDON: CPT

## 2022-07-12 PROCEDURE — 25010000002 DEXAMETHASONE SODIUM PHOSPHATE 10 MG/ML SOLUTION: Performed by: PHYSICIAN ASSISTANT

## 2022-07-12 PROCEDURE — 81003 URINALYSIS AUTO W/O SCOPE: CPT | Performed by: PHYSICIAN ASSISTANT

## 2022-07-12 PROCEDURE — 25010000002 DIPHENHYDRAMINE PER 50 MG: Performed by: STUDENT IN AN ORGANIZED HEALTH CARE EDUCATION/TRAINING PROGRAM

## 2022-07-12 PROCEDURE — 25010000002 HEPARIN LOCK FLUSH PER 10 UNITS: Performed by: PHYSICIAN ASSISTANT

## 2022-07-12 PROCEDURE — 99283 EMERGENCY DEPT VISIT LOW MDM: CPT

## 2022-07-12 PROCEDURE — 85025 COMPLETE CBC W/AUTO DIFF WBC: CPT | Performed by: PHYSICIAN ASSISTANT

## 2022-07-12 PROCEDURE — 80053 COMPREHEN METABOLIC PANEL: CPT | Performed by: PHYSICIAN ASSISTANT

## 2022-07-12 PROCEDURE — 85652 RBC SED RATE AUTOMATED: CPT | Performed by: PHYSICIAN ASSISTANT

## 2022-07-12 PROCEDURE — 80306 DRUG TEST PRSMV INSTRMNT: CPT | Performed by: PHYSICIAN ASSISTANT

## 2022-07-12 PROCEDURE — 70450 CT HEAD/BRAIN W/O DYE: CPT | Performed by: RADIOLOGY

## 2022-07-12 PROCEDURE — 96376 TX/PRO/DX INJ SAME DRUG ADON: CPT

## 2022-07-12 PROCEDURE — 96374 THER/PROPH/DIAG INJ IV PUSH: CPT

## 2022-07-12 PROCEDURE — 25010000002 HYDROMORPHONE 1 MG/ML SOLUTION: Performed by: STUDENT IN AN ORGANIZED HEALTH CARE EDUCATION/TRAINING PROGRAM

## 2022-07-12 PROCEDURE — 25010000002 DIPHENHYDRAMINE PER 50 MG: Performed by: PHYSICIAN ASSISTANT

## 2022-07-12 PROCEDURE — 36415 COLL VENOUS BLD VENIPUNCTURE: CPT

## 2022-07-12 PROCEDURE — 86140 C-REACTIVE PROTEIN: CPT | Performed by: PHYSICIAN ASSISTANT

## 2022-07-12 RX ORDER — DEXAMETHASONE SODIUM PHOSPHATE 10 MG/ML
6 INJECTION, SOLUTION INTRAMUSCULAR; INTRAVENOUS ONCE
Status: COMPLETED | OUTPATIENT
Start: 2022-07-12 | End: 2022-07-12

## 2022-07-12 RX ORDER — BUTORPHANOL TARTRATE 1 MG/ML
1 INJECTION, SOLUTION INTRAMUSCULAR; INTRAVENOUS ONCE
Status: COMPLETED | OUTPATIENT
Start: 2022-07-12 | End: 2022-07-12

## 2022-07-12 RX ORDER — SODIUM CHLORIDE 0.9 % (FLUSH) 0.9 %
10 SYRINGE (ML) INJECTION AS NEEDED
Status: DISCONTINUED | OUTPATIENT
Start: 2022-07-12 | End: 2022-07-12 | Stop reason: HOSPADM

## 2022-07-12 RX ORDER — HEPARIN SODIUM (PORCINE) LOCK FLUSH IV SOLN 100 UNIT/ML 100 UNIT/ML
300 SOLUTION INTRAVENOUS ONCE
Status: COMPLETED | OUTPATIENT
Start: 2022-07-12 | End: 2022-07-12

## 2022-07-12 RX ORDER — DIPHENHYDRAMINE HYDROCHLORIDE 50 MG/ML
25 INJECTION INTRAMUSCULAR; INTRAVENOUS ONCE
Status: COMPLETED | OUTPATIENT
Start: 2022-07-12 | End: 2022-07-12

## 2022-07-12 RX ADMIN — HYDROMORPHONE HYDROCHLORIDE 1 MG: 1 INJECTION, SOLUTION INTRAMUSCULAR; INTRAVENOUS; SUBCUTANEOUS at 15:08

## 2022-07-12 RX ADMIN — DIPHENHYDRAMINE HYDROCHLORIDE 25 MG: 50 INJECTION INTRAMUSCULAR; INTRAVENOUS at 15:08

## 2022-07-12 RX ADMIN — HEPARIN SODIUM (PORCINE) LOCK FLUSH IV SOLN 100 UNIT/ML 300 UNITS: 100 SOLUTION at 18:26

## 2022-07-12 RX ADMIN — DIPHENHYDRAMINE HYDROCHLORIDE 25 MG: 50 INJECTION INTRAMUSCULAR; INTRAVENOUS at 16:18

## 2022-07-12 RX ADMIN — DEXAMETHASONE SODIUM PHOSPHATE 6 MG: 10 INJECTION INTRAMUSCULAR; INTRAVENOUS at 15:18

## 2022-07-12 RX ADMIN — BUTORPHANOL TARTRATE 1 MG: 1 INJECTION, SOLUTION INTRAMUSCULAR; INTRAVENOUS at 17:36

## 2022-07-12 NOTE — ED PROVIDER NOTES
Subjective   Patient is a 47-year-old female with migraine headaches, bipolar disorder, irritable bowel syndrome, fibromyalgia, degenerative disc disease, and meningioma the brain presents to the ED with complaints of headache.  She states that she does have chronic migraine headaches but this feels slightly different.  She states the pain is more sharp along the right side of her head.  She states she has tried her typical medications at home with no improvement.  She is also had associated nausea and photophobia.  She denies chest pain, shortness of breath, fever, chills, vomiting, dizziness, change in vision, extremity weakness or paresthesias, bladder or bowel incontinence, abdominal pain, dysuria, diarrhea, or constipation.      History provided by:  Patient   used: No        Review of Systems   Constitutional: Negative.  Negative for chills, diaphoresis, fatigue and fever.   HENT: Negative for congestion, dental problem, drooling, ear discharge, ear pain, postnasal drip, rhinorrhea, sinus pressure, sinus pain, sneezing, sore throat, tinnitus and trouble swallowing.    Eyes: Negative.  Negative for photophobia, pain, discharge, redness and itching.   Respiratory: Negative.  Negative for cough, shortness of breath and wheezing.    Cardiovascular: Negative.  Negative for chest pain.   Gastrointestinal: Positive for nausea. Negative for abdominal pain, constipation, diarrhea and vomiting.   Endocrine: Negative.    Genitourinary: Negative.  Negative for difficulty urinating and dysuria.   Musculoskeletal: Negative.  Negative for arthralgias, back pain, gait problem, joint swelling, myalgias, neck pain and neck stiffness.   Skin: Negative.    Neurological: Positive for headaches. Negative for dizziness, tremors, seizures, syncope, facial asymmetry, speech difficulty, weakness, light-headedness and numbness.   Psychiatric/Behavioral: Negative.  Negative for confusion.   All other systems reviewed  and are negative.      Past Medical History:   Diagnosis Date   • Abdominal pain    • Abdominal swelling    • Hoyos's disease    • Bipolar 1 disorder (HCC)    • Brain tumor (benign) (HCC)    • Brain tumor (HCC)     R Frontal Lobe per pt   • Brain tumor (HCC) 2014   • Constipation    • DDD (degenerative disc disease), cervical 05/29/2017   • DDD (degenerative disc disease), cervical    • Diarrhea    • Fibromyalgia    • IBS (irritable bowel syndrome)    • Migraine    • Nausea & vomiting    • PONV (postoperative nausea and vomiting)    • PTSD (post-traumatic stress disorder)    • Rectal bleeding        Allergies   Allergen Reactions   • Ativan [Lorazepam] Hallucinations     confusion   • Sulfa Antibiotics Shortness Of Breath and Swelling   • Sulfa Antibiotics Anaphylaxis   • Reglan [Metoclopramide] Angioedema   • Compazine [Prochlorperazine Edisylate] Hives   • Demerol [Meperidine] Hives   • Droperidol Itching   • Metoclopramide Swelling   • Toradol [Ketorolac Tromethamine] Hives and Itching   • Toradol [Ketorolac Tromethamine] Hives   • Zofran [Ondansetron Hcl] Rash   • Zosyn [Piperacillin Sod-Tazobactam So] Hives       Past Surgical History:   Procedure Laterality Date   • ANAL SCOPE N/A 7/28/2016    Procedure: ANAL SCOPE;  Surgeon: Kael Lopez MD;  Location: [facility] OR;  Service:    • APPENDECTOMY     • COLONOSCOPY N/A 6/30/2016    Procedure: COLONOSCOPY  CPTCODE:89256;  Surgeon: Jose Antonio Belle III, MD;  Location: [facility] OR;  Service:    • COLONOSCOPY N/A 7/7/2016    Procedure: COLONOSCOPY (46372) CPT;  Surgeon: Jose Antonio Belle III, MD;  Location: [facility] OR;  Service:    • CYSTOSCOPY RETROGRADE PYELOGRAM Bilateral 4/28/2017    Procedure: CYSTOSCOPY RETROGRADE PYELOGRAM;  Surgeon: Mu Blunt MD;  Location: [facility] OR;  Service:    • ENDOSCOPY N/A 6/30/2016    Procedure: ESOPHAGOGASTRODUODENOSCOPY WITH BIOPSY  CPTCODE:62530;  Surgeon: Jose Antonio Belle III, MD;  Location: [facility] OR;  Service:     • HEMORRHOIDECTOMY N/A 2016    Procedure: HEMORRHOID STAPLING;  Surgeon: Kael Lopez MD;  Location: [facility] OR;  Service:    • HYSTERECTOMY     • KNEE SURGERY     • LAPAROSCOPIC SALPINGOOPHERECTOMY     • PORTACATH PLACEMENT N/A 2017    Procedure: INSERTION OF PORTACATH;  Surgeon: Celso Arerdondo MD;  Location: [facility] OR;  Service:    • SHOULDER SURGERY      3 times       Family History   Problem Relation Age of Onset   • Crohn's disease Other    • Hypertension Other    • Diabetes Other    • Irritable bowel syndrome Other    • No Known Problems Father    • No Known Problems Mother        Social History     Socioeconomic History   • Marital status:    Tobacco Use   • Smoking status: Former Smoker     Packs/day: 1.00     Years: 20.00     Pack years: 20.00     Quit date: 2015     Years since quittin.6   • Smokeless tobacco: Never Used   Vaping Use   • Vaping Use: Never used   Substance and Sexual Activity   • Alcohol use: No   • Drug use: Yes     Types: Marijuana     Comment: for pain   • Sexual activity: Defer     Birth control/protection: Surgical           Objective   Physical Exam  Vitals and nursing note reviewed.   Constitutional:       General: She is not in acute distress.     Appearance: She is well-developed. She is not diaphoretic.   HENT:      Head: Normocephalic and atraumatic.      Right Ear: External ear normal.      Left Ear: External ear normal.      Nose: Nose normal.      Mouth/Throat:      Mouth: Mucous membranes are moist.   Eyes:      Extraocular Movements: Extraocular movements intact.      Conjunctiva/sclera: Conjunctivae normal.      Pupils: Pupils are equal, round, and reactive to light.   Neck:      Vascular: No JVD.      Trachea: No tracheal deviation.      Meningeal: Brudzinski's sign and Kernig's sign absent.   Cardiovascular:      Rate and Rhythm: Normal rate and regular rhythm.      Pulses: Normal pulses.      Heart sounds: Normal heart sounds. No  murmur heard.  Pulmonary:      Effort: Pulmonary effort is normal. No respiratory distress.      Breath sounds: Normal breath sounds. No wheezing.   Abdominal:      General: Bowel sounds are normal. There is no distension.      Palpations: Abdomen is soft.      Tenderness: There is no abdominal tenderness. There is no guarding or rebound.   Musculoskeletal:         General: No deformity. Normal range of motion.      Cervical back: Normal range of motion and neck supple.      Right lower leg: No edema.      Left lower leg: No edema.   Skin:     General: Skin is warm and dry.      Coloration: Skin is not pale.      Findings: No erythema or rash.   Neurological:      General: No focal deficit present.      Mental Status: She is alert and oriented to person, place, and time.      Cranial Nerves: No cranial nerve deficit.      Sensory: No sensory deficit.      Motor: No weakness.      Coordination: Coordination normal.      Gait: Gait normal.   Psychiatric:         Mood and Affect: Mood normal.         Behavior: Behavior normal.         Thought Content: Thought content normal.         Procedures           ED Course  ED Course as of 07/12/22 1846   Tue Jul 12, 2022   1540 CT Head Without Contrast  IMPRESSION:    Again noted is a ossified meningioma overlying the right frontal  region again measuring about 1.5 cm.     This report was finalized on 7/12/2022 3:31 PM by Dr. Rigoberto Huddleston MD. [MH]   1752 Patient states headache significantly improved.  We discussed plan of care.  Advised if symptoms worsen return to ED.  Advised follow-up with PCP. [MH]      ED Course User Index  [MH] Rowena Stubbs PA-C                                           [facility]    Final diagnoses:   Acute nonintractable headache, unspecified headache type       ED Disposition  ED Disposition     ED Disposition   Discharge    Condition   Stable    Comment   --             Mabel Patterson, MIGDALIA  [address]  [place]  [address]  [phone]    Schedule an appointment as soon as possible for a visit in 1 day           Medication List      No changes were made to your prescriptions during this visit.          Rowena Stubbs PA-C  07/12/22 8021

## 2022-07-18 ENCOUNTER — TELEPHONE (OUTPATIENT)
Dept: UROLOGY | Facility: CLINIC | Age: 47
End: 2022-07-18

## 2022-07-18 NOTE — TELEPHONE ENCOUNTER
Patient was scheduled for 7/18 but due to  being out of the office on that day she needs rescheduled. He does not have an opening until octorber.  When would be a good time to put her on his schedule?

## 2022-07-19 ENCOUNTER — APPOINTMENT (OUTPATIENT)
Dept: CT IMAGING | Facility: HOSPITAL | Age: 47
End: 2022-07-19

## 2022-07-19 ENCOUNTER — HOSPITAL ENCOUNTER (EMERGENCY)
Facility: HOSPITAL | Age: 47
Discharge: HOME | End: 2022-07-19
Attending: STUDENT IN AN ORGANIZED HEALTH CARE EDUCATION/TRAINING PROGRAM | Admitting: STUDENT IN AN ORGANIZED HEALTH CARE EDUCATION/TRAINING PROGRAM

## 2022-07-19 VITALS
DIASTOLIC BLOOD PRESSURE: 68 MMHG | WEIGHT: 122 LBS | OXYGEN SATURATION: 99 % | RESPIRATION RATE: 18 BRPM | BODY MASS INDEX: 18.49 KG/M2 | HEART RATE: 94 BPM | SYSTOLIC BLOOD PRESSURE: 107 MMHG | TEMPERATURE: 97.2 F | HEIGHT: 68 IN

## 2022-07-19 DIAGNOSIS — R07.89 CHEST WALL PAIN: Primary | ICD-10-CM

## 2022-07-19 PROCEDURE — 99283 EMERGENCY DEPT VISIT LOW MDM: CPT

## 2022-07-19 PROCEDURE — 71250 CT THORAX DX C-: CPT

## 2022-07-19 PROCEDURE — 74176 CT ABD & PELVIS W/O CONTRAST: CPT

## 2022-07-19 RX ORDER — LIDOCAINE 50 MG/G
1 PATCH TOPICAL EVERY 24 HOURS
Qty: 7 EACH | Refills: 0 | Status: SHIPPED | OUTPATIENT
Start: 2022-07-19 | End: 2022-07-26

## 2022-07-19 RX ORDER — METHOCARBAMOL 750 MG/1
750 TABLET, FILM COATED ORAL 3 TIMES DAILY PRN
Qty: 15 TABLET | Refills: 0 | Status: SHIPPED | OUTPATIENT
Start: 2022-07-19 | End: 2022-07-24

## 2022-07-19 RX ORDER — HYDROCODONE BITARTRATE AND ACETAMINOPHEN 10; 325 MG/1; MG/1
1 TABLET ORAL ONCE
Status: COMPLETED | OUTPATIENT
Start: 2022-07-19 | End: 2022-07-19

## 2022-07-19 RX ORDER — HYDROCODONE BITARTRATE AND ACETAMINOPHEN 5; 325 MG/1; MG/1
1 TABLET ORAL EVERY 6 HOURS PRN
Qty: 6 TABLET | Refills: 0 | Status: SHIPPED | OUTPATIENT
Start: 2022-07-19 | End: 2022-07-25

## 2022-07-19 RX ADMIN — HYDROCODONE BITARTRATE AND ACETAMINOPHEN 1 TABLET: 10; 325 TABLET ORAL at 20:31
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
## 2022-07-20 NOTE — DISCHARGE INSTRUCTIONS
CT imaging does not show any diagnosable injuries.    CT imaging of the chest has the following finding and recommendation: Previously described bilobed nodular opacity in the suprahilar medial right upper lobe likely also represents an area of bronchiectasis and chronic mucous plugging. This is unchanged over the comparison interval. Suggest reassessment in one year given  stability over greater than 6 months.

## 2022-07-22 ENCOUNTER — APPOINTMENT (OUTPATIENT)
Dept: CT IMAGING | Facility: HOSPITAL | Age: 47
End: 2022-07-22

## 2022-07-22 ENCOUNTER — HOSPITAL ENCOUNTER (EMERGENCY)
Facility: HOSPITAL | Age: 47
Discharge: HOME | End: 2022-07-22
Attending: EMERGENCY MEDICINE | Admitting: EMERGENCY MEDICINE

## 2022-07-22 ENCOUNTER — APPOINTMENT (OUTPATIENT)
Dept: GENERAL RADIOLOGY | Facility: HOSPITAL | Age: 47
End: 2022-07-22

## 2022-07-22 VITALS
WEIGHT: 121 LBS | TEMPERATURE: 98.6 F | DIASTOLIC BLOOD PRESSURE: 77 MMHG | RESPIRATION RATE: 18 BRPM | BODY MASS INDEX: 18.34 KG/M2 | HEART RATE: 98 BPM | OXYGEN SATURATION: 98 % | SYSTOLIC BLOOD PRESSURE: 135 MMHG | HEIGHT: 68 IN

## 2022-07-22 DIAGNOSIS — S20.211A CONTUSION OF RIB ON RIGHT SIDE, INITIAL ENCOUNTER: Primary | ICD-10-CM

## 2022-07-22 LAB
ALBUMIN SERPL-MCNC: 4.09 G/DL (ref 3.5–5.2)
ALBUMIN/GLOB SERPL: 1.9 G/DL
ALP SERPL-CCNC: 63 U/L (ref 39–117)
ALT SERPL W P-5'-P-CCNC: 32 U/L (ref 1–33)
AMPHET+METHAMPHET UR QL: POSITIVE
AMPHETAMINES UR QL: POSITIVE
ANION GAP SERPL CALCULATED.3IONS-SCNC: 14.1 MMOL/L (ref 5–15)
AST SERPL-CCNC: 30 U/L (ref 1–32)
BARBITURATES UR QL SCN: NEGATIVE
BASOPHILS # BLD AUTO: 0.01 10*3/MM3 (ref 0–0.2)
BASOPHILS NFR BLD AUTO: 0.2 % (ref 0–1.5)
BENZODIAZ UR QL SCN: NEGATIVE
BILIRUB SERPL-MCNC: 0.5 MG/DL (ref 0–1.2)
BILIRUB UR QL STRIP: NEGATIVE
BUN SERPL-MCNC: 4 MG/DL (ref 6–20)
BUN/CREAT SERPL: 6.8 (ref 7–25)
BUPRENORPHINE SERPL-MCNC: NEGATIVE NG/ML
CALCIUM SPEC-SCNC: 9.2 MG/DL (ref 8.6–10.5)
CANNABINOIDS SERPL QL: POSITIVE
CHLORIDE SERPL-SCNC: 105 MMOL/L (ref 98–107)
CLARITY UR: CLEAR
CO2 SERPL-SCNC: 21.9 MMOL/L (ref 22–29)
COCAINE UR QL: NEGATIVE
COLOR UR: YELLOW
CREAT SERPL-MCNC: 0.59 MG/DL (ref 0.57–1)
CRP SERPL-MCNC: <0.3 MG/DL (ref 0–0.5)
D DIMER PPP FEU-MCNC: 3.5 MCGFEU/ML (ref 0–0.5)
D-LACTATE SERPL-SCNC: 1.6 MMOL/L (ref 0.5–2)
DEPRECATED RDW RBC AUTO: 44.2 FL (ref 37–54)
EGFRCR SERPLBLD CKD-EPI 2021: 112 ML/MIN/1.73
EOSINOPHIL # BLD AUTO: 0.13 10*3/MM3 (ref 0–0.4)
EOSINOPHIL NFR BLD AUTO: 2.9 % (ref 0.3–6.2)
ERYTHROCYTE [DISTWIDTH] IN BLOOD BY AUTOMATED COUNT: 12.8 % (ref 12.3–15.4)
FLUAV RNA RESP QL NAA+PROBE: NOT DETECTED
FLUBV RNA RESP QL NAA+PROBE: NOT DETECTED
GLOBULIN UR ELPH-MCNC: 2.1 GM/DL
GLUCOSE SERPL-MCNC: 109 MG/DL (ref 65–99)
GLUCOSE UR STRIP-MCNC: NEGATIVE MG/DL
HCT VFR BLD AUTO: 44.4 % (ref 34–46.6)
HGB BLD-MCNC: 14.8 G/DL (ref 12–15.9)
HGB UR QL STRIP.AUTO: NEGATIVE
HOLD SPECIMEN: NORMAL
HOLD SPECIMEN: NORMAL
IMM GRANULOCYTES # BLD AUTO: 0 10*3/MM3 (ref 0–0.05)
IMM GRANULOCYTES NFR BLD AUTO: 0 % (ref 0–0.5)
KETONES UR QL STRIP: NEGATIVE
LEUKOCYTE ESTERASE UR QL STRIP.AUTO: NEGATIVE
LYMPHOCYTES # BLD AUTO: 1.67 10*3/MM3 (ref 0.7–3.1)
LYMPHOCYTES NFR BLD AUTO: 36.7 % (ref 19.6–45.3)
MCH RBC QN AUTO: 31.3 PG (ref 26.6–33)
MCHC RBC AUTO-ENTMCNC: 33.3 G/DL (ref 31.5–35.7)
MCV RBC AUTO: 93.9 FL (ref 79–97)
METHADONE UR QL SCN: NEGATIVE
MONOCYTES # BLD AUTO: 0.33 10*3/MM3 (ref 0.1–0.9)
MONOCYTES NFR BLD AUTO: 7.3 % (ref 5–12)
NEUTROPHILS NFR BLD AUTO: 2.41 10*3/MM3 (ref 1.7–7)
NEUTROPHILS NFR BLD AUTO: 52.9 % (ref 42.7–76)
NITRITE UR QL STRIP: NEGATIVE
NRBC BLD AUTO-RTO: 0 /100 WBC (ref 0–0.2)
NT-PROBNP SERPL-MCNC: 143.5 PG/ML (ref 0–450)
OPIATES UR QL: NEGATIVE
OXYCODONE UR QL SCN: NEGATIVE
PCP UR QL SCN: NEGATIVE
PH UR STRIP.AUTO: 6.5 [PH] (ref 5–8)
PLATELET # BLD AUTO: 141 10*3/MM3 (ref 140–450)
PMV BLD AUTO: 10.3 FL (ref 6–12)
POTASSIUM SERPL-SCNC: 3.3 MMOL/L (ref 3.5–5.2)
PROCALCITONIN SERPL-MCNC: 0.04 NG/ML (ref 0–0.25)
PROPOXYPH UR QL: NEGATIVE
PROT SERPL-MCNC: 6.2 G/DL (ref 6–8.5)
PROT UR QL STRIP: NEGATIVE
RBC # BLD AUTO: 4.73 10*6/MM3 (ref 3.77–5.28)
SARS-COV-2 RNA RESP QL NAA+PROBE: NOT DETECTED
SODIUM SERPL-SCNC: 141 MMOL/L (ref 136–145)
SP GR UR STRIP: 1.01 (ref 1–1.03)
TRICYCLICS UR QL SCN: NEGATIVE
TROPONIN T SERPL-MCNC: <0.01 NG/ML (ref 0–0.03)
UROBILINOGEN UR QL STRIP: NORMAL
WBC NRBC COR # BLD: 4.55 10*3/MM3 (ref 3.4–10.8)
WHOLE BLOOD HOLD COAG: NORMAL
WHOLE BLOOD HOLD SPECIMEN: NORMAL

## 2022-07-22 PROCEDURE — 80053 COMPREHEN METABOLIC PANEL: CPT | Performed by: PHYSICIAN ASSISTANT

## 2022-07-22 PROCEDURE — 93005 ELECTROCARDIOGRAM TRACING: CPT | Performed by: EMERGENCY MEDICINE

## 2022-07-22 PROCEDURE — 36415 COLL VENOUS BLD VENIPUNCTURE: CPT

## 2022-07-22 PROCEDURE — 86140 C-REACTIVE PROTEIN: CPT | Performed by: PHYSICIAN ASSISTANT

## 2022-07-22 PROCEDURE — 80306 DRUG TEST PRSMV INSTRMNT: CPT | Performed by: PHYSICIAN ASSISTANT

## 2022-07-22 PROCEDURE — 99283 EMERGENCY DEPT VISIT LOW MDM: CPT

## 2022-07-22 PROCEDURE — 83880 ASSAY OF NATRIURETIC PEPTIDE: CPT | Performed by: PHYSICIAN ASSISTANT

## 2022-07-22 PROCEDURE — 96376 TX/PRO/DX INJ SAME DRUG ADON: CPT

## 2022-07-22 PROCEDURE — 85025 COMPLETE CBC W/AUTO DIFF WBC: CPT | Performed by: PHYSICIAN ASSISTANT

## 2022-07-22 PROCEDURE — 81003 URINALYSIS AUTO W/O SCOPE: CPT | Performed by: PHYSICIAN ASSISTANT

## 2022-07-22 PROCEDURE — 0 IOPAMIDOL PER 1 ML: Performed by: PHYSICIAN ASSISTANT

## 2022-07-22 PROCEDURE — 25010000002 HYDROMORPHONE 1 MG/ML SOLUTION: Performed by: EMERGENCY MEDICINE

## 2022-07-22 PROCEDURE — 87636 SARSCOV2 & INF A&B AMP PRB: CPT | Performed by: PHYSICIAN ASSISTANT

## 2022-07-22 PROCEDURE — 85379 FIBRIN DEGRADATION QUANT: CPT | Performed by: PHYSICIAN ASSISTANT

## 2022-07-22 PROCEDURE — 93010 ELECTROCARDIOGRAM REPORT: CPT | Performed by: INTERNAL MEDICINE

## 2022-07-22 PROCEDURE — 71275 CT ANGIOGRAPHY CHEST: CPT

## 2022-07-22 PROCEDURE — 84145 PROCALCITONIN (PCT): CPT | Performed by: PHYSICIAN ASSISTANT

## 2022-07-22 PROCEDURE — 84484 ASSAY OF TROPONIN QUANT: CPT | Performed by: PHYSICIAN ASSISTANT

## 2022-07-22 PROCEDURE — 96374 THER/PROPH/DIAG INJ IV PUSH: CPT

## 2022-07-22 PROCEDURE — 71045 X-RAY EXAM CHEST 1 VIEW: CPT

## 2022-07-22 PROCEDURE — 25010000002 HYDROMORPHONE 1 MG/ML SOLUTION: Performed by: STUDENT IN AN ORGANIZED HEALTH CARE EDUCATION/TRAINING PROGRAM

## 2022-07-22 PROCEDURE — 83605 ASSAY OF LACTIC ACID: CPT | Performed by: PHYSICIAN ASSISTANT

## 2022-07-22 PROCEDURE — 71045 X-RAY EXAM CHEST 1 VIEW: CPT | Performed by: RADIOLOGY

## 2022-07-22 RX ORDER — SODIUM CHLORIDE 0.9 % (FLUSH) 0.9 %
10 SYRINGE (ML) INJECTION AS NEEDED
Status: DISCONTINUED | OUTPATIENT
Start: 2022-07-22 | End: 2022-07-22 | Stop reason: HOSPADM

## 2022-07-22 RX ADMIN — HYDROMORPHONE HYDROCHLORIDE 1 MG: 1 INJECTION, SOLUTION INTRAMUSCULAR; INTRAVENOUS; SUBCUTANEOUS at 19:03

## 2022-07-22 RX ADMIN — IOPAMIDOL 60 ML: 755 INJECTION, SOLUTION INTRAVENOUS at 19:40

## 2022-07-22 RX ADMIN — SODIUM CHLORIDE 1000 ML: 9 INJECTION, SOLUTION INTRAVENOUS at 18:59

## 2022-07-22 RX ADMIN — HYDROMORPHONE HYDROCHLORIDE 1 MG: 1 INJECTION, SOLUTION INTRAMUSCULAR; INTRAVENOUS; SUBCUTANEOUS at 19:57

## 2022-07-24 LAB
QT INTERVAL: 378 MS
QTC INTERVAL: 452 MS

## 2022-07-25 ENCOUNTER — OFFICE VISIT (OUTPATIENT)
Dept: UROLOGY | Facility: CLINIC | Age: 47
End: 2022-07-25

## 2022-07-25 VITALS — HEIGHT: 68 IN | BODY MASS INDEX: 18.34 KG/M2 | WEIGHT: 121.03 LBS

## 2022-07-25 DIAGNOSIS — Z79.2 PROPHYLACTIC ANTIBIOTIC: Primary | ICD-10-CM

## 2022-07-25 DIAGNOSIS — N35.028 OTHER POST-TRAUMATIC URETHRAL STRICTURE, FEMALE: ICD-10-CM

## 2022-07-25 DIAGNOSIS — N23 RENAL COLIC: ICD-10-CM

## 2022-07-25 PROCEDURE — 53661 DILATION OF URETHRA: CPT | Performed by: UROLOGY

## 2022-07-25 PROCEDURE — 96372 THER/PROPH/DIAG INJ SC/IM: CPT | Performed by: UROLOGY

## 2022-07-25 RX ORDER — OXYCODONE AND ACETAMINOPHEN 10; 325 MG/1; MG/1
1 TABLET ORAL EVERY 6 HOURS PRN
Qty: 20 TABLET | Refills: 0 | Status: SHIPPED | OUTPATIENT
Start: 2022-07-25 | End: 2022-08-18 | Stop reason: SDUPTHER

## 2022-07-25 RX ORDER — GENTAMICIN SULFATE 40 MG/ML
80 INJECTION, SOLUTION INTRAMUSCULAR; INTRAVENOUS ONCE
Status: COMPLETED | OUTPATIENT
Start: 2022-07-25 | End: 2022-07-25

## 2022-07-25 RX ORDER — PROMETHAZINE HYDROCHLORIDE 25 MG/1
25 TABLET ORAL EVERY 6 HOURS PRN
Qty: 21 TABLET | Refills: 2 | Status: SHIPPED | OUTPATIENT
Start: 2022-07-25 | End: 2022-08-25 | Stop reason: SDUPTHER

## 2022-07-25 RX ADMIN — GENTAMICIN SULFATE 80 MG: 40 INJECTION, SOLUTION INTRAMUSCULAR; INTRAVENOUS at 13:07

## 2022-07-25 NOTE — PROGRESS NOTES
Chief Complaint:      Chief Complaint   Patient presents with   • urethral  dilataion       HPI:   47 y.o. female with severe urethral stricture disease that is posttraumatic here for dilation.    Past Medical History:     Past Medical History:   Diagnosis Date   • Abdominal pain    • Abdominal swelling    • Hoyos's disease    • Bipolar 1 disorder (HCC)    • Brain tumor (benign) (HCC)    • Brain tumor (HCC)     R Frontal Lobe per pt   • Brain tumor (HCC) 2014   • Constipation    • DDD (degenerative disc disease), cervical 05/29/2017   • DDD (degenerative disc disease), cervical    • Diarrhea    • Fibromyalgia    • IBS (irritable bowel syndrome)    • Migraine    • Nausea & vomiting    • PONV (postoperative nausea and vomiting)    • PTSD (post-traumatic stress disorder)    • Rectal bleeding        Current Meds:     Current Outpatient Medications   Medication Sig Dispense Refill   • albuterol (PROVENTIL HFA;VENTOLIN HFA) 108 (90 Base) MCG/ACT inhaler Inhale 2 puffs 2 (Two) Times a Day.     • Azelastine HCl 137 MCG/SPRAY solution 1 spray into each nostril As Needed (Allergies).     • busPIRone (BUSPAR) 15 MG tablet Take 15 mg by mouth 3 (three) times a day        • ciprofloxacin (CIPRO) 500 MG tablet Take 1 tablet by mouth 2 (Two) Times a Day. 19 tablet 0   • diclofenac (VOLTAREN) 1 % gel gel      • divalproex (DEPAKOTE) 500 MG DR tablet Take 1 tablet by mouth 3 (Three) Times a Day. 90 tablet 2   • docusate sodium (COLACE) 100 MG capsule Take 1 capsule by mouth 2 (Two) Times a Day. 20 capsule 0   • docusate sodium (COLACE) 100 MG capsule Take 1 capsule by mouth 2 (Two) Times a Day As Needed for Constipation. 60 capsule 0   • fluticasone (VERAMYST) 27.5 MCG/SPRAY nasal spray 2 sprays into each nostril Daily.     • HYDROcodone-acetaminophen (NORCO) 5-325 MG per tablet Take 1 tablet by mouth Every 6 (Six) Hours As Needed for Severe Pain  for up to 6 days. 6 tablet 0   • lidocaine (LIDODERM) 5 % Place 1 patch on the skin  as directed by provider Daily for 7 days. Remove & Discard patch within 12 hours or as directed by MD 7 each 0   • megestrol (MEGACE) 20 MG tablet Take 1 tablet by mouth Daily. 30 tablet 3   • metroNIDAZOLE (FLAGYL) 500 MG tablet Take 1 tablet by mouth 3 (Three) Times a Day. 30 tablet 0   • montelukast (SINGULAIR) 10 MG tablet Take 10 mg by mouth Every Night.     • oxyCODONE-acetaminophen (Percocet)  MG per tablet Take 1 tablet by mouth Every 6 (Six) Hours As Needed for Moderate Pain . 14 tablet 0   • oxyCODONE-acetaminophen (PERCOCET) 5-325 MG per tablet Take 1 tablet by mouth Every 8 (Eight) Hours As Needed for Moderate Pain . 4 tablet 0   • pantoprazole (PROTONIX) 40 MG EC tablet Take 40 mg by mouth 2 (Two) Times a Day As Needed.     • phenazopyridine (PYRIDIUM) 100 MG tablet      • polyethylene glycol (MIRALAX) 17 GM/SCOOP powder Take 17 g by mouth Daily. 225 g 0   • predniSONE (DELTASONE) 50 MG tablet Take 1 tablet by mouth Daily. 5 tablet 0   • promethazine (PHENERGAN) 25 MG tablet Take 1 tablet by mouth Every 6 (Six) Hours As Needed for Nausea or Vomiting. 21 tablet 2   • sertraline (ZOLOFT) 100 MG tablet Take 100 mg by mouth 2 (Two) Times a Day.     • SUMAtriptan (IMITREX) 6 MG/0.5ML injection Inject 6 mg under the skin 1 (One) Time.     • traMADol (ULTRAM) 50 MG tablet        Current Facility-Administered Medications   Medication Dose Route Frequency Provider Last Rate Last Admin   • gentamicin (GARAMYCIN) injection 80 mg  80 mg Intramuscular Once Mu Blunt MD            Allergies:      Allergies   Allergen Reactions   • Ativan [Lorazepam] Hallucinations     confusion   • Sulfa Antibiotics Shortness Of Breath and Swelling   • Sulfa Antibiotics Anaphylaxis   • Reglan [Metoclopramide] Angioedema   • Compazine [Prochlorperazine Edisylate] Hives   • Demerol [Meperidine] Hives   • Droperidol Itching   • Metoclopramide Swelling   • Toradol [Ketorolac Tromethamine] Hives and Itching   • Toradol  [Ketorolac Tromethamine] Hives   • Zofran [Ondansetron Hcl] Rash   • Zosyn [Piperacillin Sod-Tazobactam So] Hives        Past Surgical History:     Past Surgical History:   Procedure Laterality Date   • ANAL SCOPE N/A 2016    Procedure: ANAL SCOPE;  Surgeon: Kael Lopez MD;  Location: [facility] OR;  Service:    • APPENDECTOMY     • COLONOSCOPY N/A 2016    Procedure: COLONOSCOPY  CPTCODE:36441;  Surgeon: Jose Antonio Belle III, MD;  Location: [facility] OR;  Service:    • COLONOSCOPY N/A 2016    Procedure: COLONOSCOPY (38575) CPT;  Surgeon: Jose Antonio Belle III, MD;  Location: [facility] OR;  Service:    • CYSTOSCOPY RETROGRADE PYELOGRAM Bilateral 2017    Procedure: CYSTOSCOPY RETROGRADE PYELOGRAM;  Surgeon: Mu Blunt MD;  Location: [facility] OR;  Service:    • ENDOSCOPY N/A 2016    Procedure: ESOPHAGOGASTRODUODENOSCOPY WITH BIOPSY  CPTCODE:24992;  Surgeon: Jose Antonio Belle III, MD;  Location: [facility] OR;  Service:    • HEMORRHOIDECTOMY N/A 2016    Procedure: HEMORRHOID STAPLING;  Surgeon: Kael Lopez MD;  Location: [facility] OR;  Service:    • HYSTERECTOMY     • KNEE SURGERY     • LAPAROSCOPIC SALPINGOOPHERECTOMY     • PORTACATH PLACEMENT N/A 2017    Procedure: INSERTION OF PORTACATH;  Surgeon: Celso Arredondo MD;  Location: [facility] OR;  Service:    • SHOULDER SURGERY      3 times       Social History:     Social History     Socioeconomic History   • Marital status:    Tobacco Use   • Smoking status: Former Smoker     Packs/day: 1.00     Years: 20.00     Pack years: 20.00     Quit date: 2015     Years since quittin.7   • Smokeless tobacco: Never Used   Vaping Use   • Vaping Use: Never used   Substance and Sexual Activity   • Alcohol use: No   • Drug use: Yes     Types: Marijuana     Comment: for pain   • Sexual activity: Defer     Birth control/protection: Surgical       Family History:     Family History   Problem Relation Age of Onset   • Crohn's  disease Other    • Hypertension Other    • Diabetes Other    • Irritable bowel syndrome Other    • No Known Problems Father    • No Known Problems Mother        Review of Systems:     Review of Systems   Constitutional: Negative.  Negative for activity change, appetite change, chills, diaphoresis, fatigue and unexpected weight change.   HENT: Negative for congestion, dental problem, drooling, ear discharge, ear pain, facial swelling, hearing loss, mouth sores, nosebleeds, postnasal drip, rhinorrhea, sinus pressure, sneezing, sore throat, tinnitus, trouble swallowing and voice change.    Eyes: Negative.  Negative for photophobia, pain, discharge, redness, itching and visual disturbance.   Respiratory: Negative.  Negative for apnea, cough, choking, chest tightness, shortness of breath, wheezing and stridor.    Cardiovascular: Negative.  Negative for chest pain, palpitations and leg swelling.   Gastrointestinal: Negative.  Negative for abdominal distention, abdominal pain, anal bleeding, blood in stool, constipation, diarrhea, nausea, rectal pain and vomiting.   Endocrine: Negative.  Negative for cold intolerance, heat intolerance, polydipsia, polyphagia and polyuria.   Genitourinary: Positive for difficulty urinating.   Musculoskeletal: Negative.  Negative for arthralgias, back pain, gait problem, joint swelling, myalgias, neck pain and neck stiffness.   Skin: Negative.  Negative for color change, pallor, rash and wound.   Allergic/Immunologic: Negative.  Negative for environmental allergies, food allergies and immunocompromised state.   Neurological: Negative.  Negative for dizziness, tremors, seizures, syncope, facial asymmetry, speech difficulty, weakness, light-headedness, numbness and headaches.   Hematological: Negative.  Negative for adenopathy. Does not bruise/bleed easily.   Psychiatric/Behavioral: Negative for agitation, behavioral problems, confusion, decreased concentration, dysphoric mood, hallucinations,  self-injury, sleep disturbance and suicidal ideas. The patient is not nervous/anxious and is not hyperactive.    All other systems reviewed and are negative.      Physical Exam:     Physical Exam  Constitutional:       Appearance: She is well-developed.   HENT:      Head: Normocephalic and atraumatic.      Right Ear: External ear normal.      Left Ear: External ear normal.   Eyes:      Conjunctiva/sclera: Conjunctivae normal.      Pupils: Pupils are equal, round, and reactive to light.   Cardiovascular:      Rate and Rhythm: Normal rate and regular rhythm.      Heart sounds: Normal heart sounds.   Pulmonary:      Effort: Pulmonary effort is normal.      Breath sounds: Normal breath sounds.   Abdominal:      General: Bowel sounds are normal. There is no distension.      Palpations: Abdomen is soft. There is no mass.      Tenderness: There is no abdominal tenderness. There is no guarding or rebound.   Genitourinary:     General: Normal vulva.      Vagina: No vaginal discharge.   Musculoskeletal:         General: Normal range of motion.   Skin:     General: Skin is warm and dry.   Neurological:      Mental Status: She is alert.      Deep Tendon Reflexes: Reflexes are normal and symmetric.   Psychiatric:         Behavior: Behavior normal.         Thought Content: Thought content normal.         Judgment: Judgment normal.         I have reviewed the following portions of the patient's history: Allergies, current medications, past family history, past medical history, past social history, past surgical history, problem list, and ROS and confirm it is accurate.    Recent Image (CT and/or KUB):      CT Abdomen and Pelvis: No results found for this or any previous visit.       CT Stone Protocol: Results for orders placed during the hospital encounter of 03/29/22    CT Abdomen Pelvis Stone Protocol    Narrative  CT Abdomen Pelvis WO    INDICATION:  Right flank pain today    TECHNIQUE:  CT of the abdomen and pelvis without IV  contrast. Coronal and sagittal reconstructions were obtained.  Radiation dose reduction techniques included automated exposure control or exposure modulation based on body size. Count of known CT and cardiac nuc  med studies performed in previous 12 months: 2.    COMPARISON:  3/7/2022    FINDINGS:  Abdomen: Lung bases are clear the liver, gallbladder, spleen, pancreas, adrenal glands and kidneys are normal. There are no urinary stones or hydronephrosis identified. The aorta is normal in size. There is no adenopathy. Bowel is unremarkable. The  appendix is visible on image 75. There is no evidence of appendicitis..    Pelvis: Bladder is normal. Uterus is been removed. There are no adnexal masses. Bones are unremarkable.    Impression  No urinary stones are identified. There is no evidence of appendicitis but study is limited by lack of IV contrast oral contrast and fat. I do believe I can see the appendix on image 75          Signer Name: Luis Hale MD  Signed: 3/29/2022 6:41 PM  Workstation Name: [facility]       KUB: Results for orders placed during the hospital encounter of 03/12/17    XR Abdomen KUB    Narrative  EXAMINATION: XR ABDOMEN KUB-    CLINICAL INDICATION: flank pain      COMPARISON: None available    FINDINGS: 2 views of the abdomen show a moderate to large amount of  stool in the colon.    No evidence of bowel obstruction.    Calcifications in the pelvis probably phleboliths.    This report was finalized on 3/12/2017 8:11 PM by Dr. Luis Fernando Macdonald MD.       Labs (past 3 months):      Admission on 07/22/2022, Discharged on 07/22/2022   Component Date Value Ref Range Status   • QT Interval 07/22/2022 378  ms Final   • QTC Interval 07/22/2022 452  ms Final   • Glucose 07/22/2022 109 (A) 65 - 99 mg/dL Final   • BUN 07/22/2022 4 (A) 6 - 20 mg/dL Final   • Creatinine 07/22/2022 0.59  0.57 - 1.00 mg/dL Final   • Sodium 07/22/2022 141  136 - 145 mmol/L Final   • Potassium  07/22/2022 3.3 (A) 3.5 - 5.2 mmol/L Final   • Chloride 07/22/2022 105  98 - 107 mmol/L Final   • CO2 07/22/2022 21.9 (A) 22.0 - 29.0 mmol/L Final   • Calcium 07/22/2022 9.2  8.6 - 10.5 mg/dL Final   • Total Protein 07/22/2022 6.2  6.0 - 8.5 g/dL Final   • Albumin 07/22/2022 4.09  3.50 - 5.20 g/dL Final   • ALT (SGPT) 07/22/2022 32  1 - 33 U/L Final   • AST (SGOT) 07/22/2022 30  1 - 32 U/L Final   • Alkaline Phosphatase 07/22/2022 63  39 - 117 U/L Final   • Total Bilirubin 07/22/2022 0.5  0.0 - 1.2 mg/dL Final   • Globulin 07/22/2022 2.1  gm/dL Final   • A/G Ratio 07/22/2022 1.9  g/dL Final   • BUN/Creatinine Ratio 07/22/2022 6.8 (A) 7.0 - 25.0 Final   • Anion Gap 07/22/2022 14.1  5.0 - 15.0 mmol/L Final   • eGFR 07/22/2022 112.0  >60.0 mL/min/1.73 Final    National Kidney Foundation and American Society of Nephrology (ASN) Task Force recommended calculation based on the Chronic Kidney Disease Epidemiology Collaboration (CKD-EPI) equation refit without adjustment for race.   • proBNP 07/22/2022 143.5  0.0 - 450.0 pg/mL Final   • Troponin T 07/22/2022 <0.010  0.000 - 0.030 ng/mL Final   • D-Dimer, Quantitative 07/22/2022 3.50 (A) 0.00 - 0.50 MCGFEU/mL Final   • C-Reactive Protein 07/22/2022 <0.30  0.00 - 0.50 mg/dL Final   • Lactate 07/22/2022 1.6  0.5 - 2.0 mmol/L Final   • Procalcitonin 07/22/2022 0.04  0.00 - 0.25 ng/mL Final   • THC, Screen, Urine 07/22/2022 Positive (A) Negative Final   • Phencyclidine (PCP), Urine 07/22/2022 Negative  Negative Final   • Cocaine Screen, Urine 07/22/2022 Negative  Negative Final   • Methamphetamine, Ur 07/22/2022 Positive (A) Negative Final   • Opiate Screen 07/22/2022 Negative  Negative Final   • Amphetamine Screen, Urine 07/22/2022 Positive (A) Negative Final   • Benzodiazepine Screen, Urine 07/22/2022 Negative  Negative Final   • Tricyclic Antidepressants Screen 07/22/2022 Negative  Negative Final   • Methadone Screen, Urine 07/22/2022 Negative  Negative Final   • Barbiturates  Screen, Urine 07/22/2022 Negative  Negative Final   • Oxycodone Screen, Urine 07/22/2022 Negative  Negative Final   • Propoxyphene Screen 07/22/2022 Negative  Negative Final   • Buprenorphine, Screen, Urine 07/22/2022 Negative  Negative Final   • COVID19 07/22/2022 Not Detected  Not Detected - Ref. Range Final   • Influenza A PCR 07/22/2022 Not Detected  Not Detected Final   • Influenza B PCR 07/22/2022 Not Detected  Not Detected Final   • Color, UA 07/22/2022 Yellow  Yellow, Straw Final   • Appearance, UA 07/22/2022 Clear  Clear Final   • pH, UA 07/22/2022 6.5  5.0 - 8.0 Final   • Specific Gravity, UA 07/22/2022 1.013  1.005 - 1.030 Final   • Glucose, UA 07/22/2022 Negative  Negative Final   • Ketones, UA 07/22/2022 Negative  Negative Final   • Bilirubin, UA 07/22/2022 Negative  Negative Final   • Blood, UA 07/22/2022 Negative  Negative Final   • Protein, UA 07/22/2022 Negative  Negative Final   • Leuk Esterase, UA 07/22/2022 Negative  Negative Final   • Nitrite, UA 07/22/2022 Negative  Negative Final   • Urobilinogen, UA 07/22/2022 1.0 E.U./dL  0.2 - 1.0 E.U./dL Final   • Extra Tube 07/22/2022 Hold for add-ons.   Final    Auto resulted.   • Extra Tube 07/22/2022 hold for add-on   Final    Auto resulted   • Extra Tube 07/22/2022 Hold for add-ons.   Final    Auto resulted.   • Extra Tube 07/22/2022 Hold for add-ons.   Final    Auto resulted   • WBC 07/22/2022 4.55  3.40 - 10.80 10*3/mm3 Final   • RBC 07/22/2022 4.73  3.77 - 5.28 10*6/mm3 Final   • Hemoglobin 07/22/2022 14.8  12.0 - 15.9 g/dL Final   • Hematocrit 07/22/2022 44.4  34.0 - 46.6 % Final   • MCV 07/22/2022 93.9  79.0 - 97.0 fL Final   • MCH 07/22/2022 31.3  26.6 - 33.0 pg Final   • MCHC 07/22/2022 33.3  31.5 - 35.7 g/dL Final   • RDW 07/22/2022 12.8  12.3 - 15.4 % Final   • RDW-SD 07/22/2022 44.2  37.0 - 54.0 fl Final   • MPV 07/22/2022 10.3  6.0 - 12.0 fL Final   • Platelets 07/22/2022 141  140 - 450 10*3/mm3 Final   • Neutrophil % 07/22/2022 52.9  42.7  - 76.0 % Final   • Lymphocyte % 07/22/2022 36.7  19.6 - 45.3 % Final   • Monocyte % 07/22/2022 7.3  5.0 - 12.0 % Final   • Eosinophil % 07/22/2022 2.9  0.3 - 6.2 % Final   • Basophil % 07/22/2022 0.2  0.0 - 1.5 % Final   • Immature Grans % 07/22/2022 0.0  0.0 - 0.5 % Final   • Neutrophils, Absolute 07/22/2022 2.41  1.70 - 7.00 10*3/mm3 Final   • Lymphocytes, Absolute 07/22/2022 1.67  0.70 - 3.10 10*3/mm3 Final   • Monocytes, Absolute 07/22/2022 0.33  0.10 - 0.90 10*3/mm3 Final   • Eosinophils, Absolute 07/22/2022 0.13  0.00 - 0.40 10*3/mm3 Final   • Basophils, Absolute 07/22/2022 0.01  0.00 - 0.20 10*3/mm3 Final   • Immature Grans, Absolute 07/22/2022 0.00  0.00 - 0.05 10*3/mm3 Final   • nRBC 07/22/2022 0.0  0.0 - 0.2 /100 WBC Final   Admission on 07/12/2022, Discharged on 07/12/2022   Component Date Value Ref Range Status   • Glucose 07/12/2022 80  65 - 99 mg/dL Final   • BUN 07/12/2022 5 (A) 6 - 20 mg/dL Final   • Creatinine 07/12/2022 0.60  0.57 - 1.00 mg/dL Final   • Sodium 07/12/2022 141  136 - 145 mmol/L Final   • Potassium 07/12/2022 4.0  3.5 - 5.2 mmol/L Final   • Chloride 07/12/2022 107  98 - 107 mmol/L Final   • CO2 07/12/2022 24.4  22.0 - 29.0 mmol/L Final   • Calcium 07/12/2022 9.2  8.6 - 10.5 mg/dL Final   • Total Protein 07/12/2022 6.5  6.0 - 8.5 g/dL Final   • Albumin 07/12/2022 4.35  3.50 - 5.20 g/dL Final   • ALT (SGPT) 07/12/2022 34 (A) 1 - 33 U/L Final   • AST (SGOT) 07/12/2022 28  1 - 32 U/L Final   • Alkaline Phosphatase 07/12/2022 60  39 - 117 U/L Final   • Total Bilirubin 07/12/2022 1.2  0.0 - 1.2 mg/dL Final   • Globulin 07/12/2022 2.2  gm/dL Final   • A/G Ratio 07/12/2022 2.0  g/dL Final   • BUN/Creatinine Ratio 07/12/2022 8.3  7.0 - 25.0 Final   • Anion Gap 07/12/2022 9.6  5.0 - 15.0 mmol/L Final   • eGFR 07/12/2022 111.6  >60.0 mL/min/1.73 Final    National Kidney Foundation and American Society of Nephrology (ASN) Task Force recommended calculation based on the Chronic Kidney Disease  Epidemiology Collaboration (CKD-EPI) equation refit without adjustment for race.   • C-Reactive Protein 07/12/2022 <0.30  0.00 - 0.50 mg/dL Final   • Sed Rate 07/12/2022 3  0 - 20 mm/hr Final   • THC, Screen, Urine 07/12/2022 Positive (A) Negative Final   • Phencyclidine (PCP), Urine 07/12/2022 Negative  Negative Final   • Cocaine Screen, Urine 07/12/2022 Negative  Negative Final   • Methamphetamine, Ur 07/12/2022 Positive (A) Negative Final   • Opiate Screen 07/12/2022 Positive (A) Negative Final   • Amphetamine Screen, Urine 07/12/2022 Positive (A) Negative Final   • Benzodiazepine Screen, Urine 07/12/2022 Negative  Negative Final   • Tricyclic Antidepressants Screen 07/12/2022 Negative  Negative Final   • Methadone Screen, Urine 07/12/2022 Negative  Negative Final   • Barbiturates Screen, Urine 07/12/2022 Negative  Negative Final   • Oxycodone Screen, Urine 07/12/2022 Negative  Negative Final   • Propoxyphene Screen 07/12/2022 Negative  Negative Final   • Buprenorphine, Screen, Urine 07/12/2022 Negative  Negative Final   • Color, UA 07/12/2022 Dark Yellow (A) Yellow, Straw Final   • Appearance, UA 07/12/2022 Clear  Clear Final   • pH, UA 07/12/2022 6.5  5.0 - 8.0 Final   • Specific Gravity, UA 07/12/2022 <=1.005  1.005 - 1.030 Final   • Glucose, UA 07/12/2022 Negative  Negative Final   • Ketones, UA 07/12/2022 Negative  Negative Final   • Bilirubin, UA 07/12/2022 Negative  Negative Final   • Blood, UA 07/12/2022 Negative  Negative Final   • Protein, UA 07/12/2022 Negative  Negative Final   • Leuk Esterase, UA 07/12/2022 Negative  Negative Final   • Nitrite, UA 07/12/2022 Negative  Negative Final   • Urobilinogen, UA 07/12/2022 1.0 E.U./dL  0.2 - 1.0 E.U./dL Final   • WBC 07/12/2022 5.03  3.40 - 10.80 10*3/mm3 Final   • RBC 07/12/2022 4.64  3.77 - 5.28 10*6/mm3 Final   • Hemoglobin 07/12/2022 14.4  12.0 - 15.9 g/dL Final   • Hematocrit 07/12/2022 41.9  34.0 - 46.6 % Final   • MCV 07/12/2022 90.3  79.0 - 97.0 fL  Final   • MCH 07/12/2022 31.0  26.6 - 33.0 pg Final   • MCHC 07/12/2022 34.4  31.5 - 35.7 g/dL Final   • RDW 07/12/2022 12.7  12.3 - 15.4 % Final   • RDW-SD 07/12/2022 41.7  37.0 - 54.0 fl Final   • MPV 07/12/2022 10.8  6.0 - 12.0 fL Final   • Platelets 07/12/2022 145  140 - 450 10*3/mm3 Final   • Neutrophil % 07/12/2022 54.6  42.7 - 76.0 % Final   • Lymphocyte % 07/12/2022 35.0  19.6 - 45.3 % Final   • Monocyte % 07/12/2022 8.2  5.0 - 12.0 % Final   • Eosinophil % 07/12/2022 1.8  0.3 - 6.2 % Final   • Basophil % 07/12/2022 0.4  0.0 - 1.5 % Final   • Immature Grans % 07/12/2022 0.0  0.0 - 0.5 % Final   • Neutrophils, Absolute 07/12/2022 2.75  1.70 - 7.00 10*3/mm3 Final   • Lymphocytes, Absolute 07/12/2022 1.76  0.70 - 3.10 10*3/mm3 Final   • Monocytes, Absolute 07/12/2022 0.41  0.10 - 0.90 10*3/mm3 Final   • Eosinophils, Absolute 07/12/2022 0.09  0.00 - 0.40 10*3/mm3 Final   • Basophils, Absolute 07/12/2022 0.02  0.00 - 0.20 10*3/mm3 Final   • Immature Grans, Absolute 07/12/2022 0.00  0.00 - 0.05 10*3/mm3 Final   • nRBC 07/12/2022 0.0  0.0 - 0.2 /100 WBC Final   Admission on 05/12/2022, Discharged on 05/12/2022   Component Date Value Ref Range Status   • Glucose 05/12/2022 58 (A) 65 - 99 mg/dL Final   • BUN 05/12/2022 5 (A) 6 - 20 mg/dL Final   • Creatinine 05/12/2022 0.53 (A) 0.57 - 1.00 mg/dL Final   • Sodium 05/12/2022 140  136 - 145 mmol/L Final   • Potassium 05/12/2022 4.1  3.5 - 5.2 mmol/L Final    Slight hemolysis detected by analyzer. Results may be affected.   • Chloride 05/12/2022 107  98 - 107 mmol/L Final   • CO2 05/12/2022 25.4  22.0 - 29.0 mmol/L Final   • Calcium 05/12/2022 8.6  8.6 - 10.5 mg/dL Final   • Total Protein 05/12/2022 6.1  6.0 - 8.5 g/dL Final   • Albumin 05/12/2022 3.83  3.50 - 5.20 g/dL Final   • ALT (SGPT) 05/12/2022 27  1 - 33 U/L Final   • AST (SGOT) 05/12/2022 28  1 - 32 U/L Final   • Alkaline Phosphatase 05/12/2022 67  39 - 117 U/L Final   • Total Bilirubin 05/12/2022 0.7  0.0 - 1.2  mg/dL Final   • Globulin 05/12/2022 2.3  gm/dL Final   • A/G Ratio 05/12/2022 1.7  g/dL Final   • BUN/Creatinine Ratio 05/12/2022 9.4  7.0 - 25.0 Final   • Anion Gap 05/12/2022 7.6  5.0 - 15.0 mmol/L Final   • eGFR 05/12/2022 115.0  >60.0 mL/min/1.73 Final    National Kidney Foundation and American Society of Nephrology (ASN) Task Force recommended calculation based on the Chronic Kidney Disease Epidemiology Collaboration (CKD-EPI) equation refit without adjustment for race.   • Valproic Acid 05/12/2022 <2.8 (A) 50.0 - 125.0 mcg/mL Final   • WBC 05/12/2022 4.37  3.40 - 10.80 10*3/mm3 Final   • RBC 05/12/2022 3.99  3.77 - 5.28 10*6/mm3 Final   • Hemoglobin 05/12/2022 12.4  12.0 - 15.9 g/dL Final   • Hematocrit 05/12/2022 36.6  34.0 - 46.6 % Final   • MCV 05/12/2022 91.7  79.0 - 97.0 fL Final   • MCH 05/12/2022 31.1  26.6 - 33.0 pg Final   • MCHC 05/12/2022 33.9  31.5 - 35.7 g/dL Final   • RDW 05/12/2022 13.3  12.3 - 15.4 % Final   • RDW-SD 05/12/2022 45.0  37.0 - 54.0 fl Final   • MPV 05/12/2022 10.4  6.0 - 12.0 fL Final   • Platelets 05/12/2022 114 (A) 140 - 450 10*3/mm3 Final   • Neutrophil % 05/12/2022 52.9  42.7 - 76.0 % Final   • Lymphocyte % 05/12/2022 37.1  19.6 - 45.3 % Final   • Monocyte % 05/12/2022 7.3  5.0 - 12.0 % Final   • Eosinophil % 05/12/2022 2.3  0.3 - 6.2 % Final   • Basophil % 05/12/2022 0.2  0.0 - 1.5 % Final   • Immature Grans % 05/12/2022 0.2  0.0 - 0.5 % Final   • Neutrophils, Absolute 05/12/2022 2.31  1.70 - 7.00 10*3/mm3 Final   • Lymphocytes, Absolute 05/12/2022 1.62  0.70 - 3.10 10*3/mm3 Final   • Monocytes, Absolute 05/12/2022 0.32  0.10 - 0.90 10*3/mm3 Final   • Eosinophils, Absolute 05/12/2022 0.10  0.00 - 0.40 10*3/mm3 Final   • Basophils, Absolute 05/12/2022 0.01  0.00 - 0.20 10*3/mm3 Final   • Immature Grans, Absolute 05/12/2022 0.01  0.00 - 0.05 10*3/mm3 Final   • nRBC 05/12/2022 0.0  0.0 - 0.2 /100 WBC Final   • Extra Tube 05/12/2022 Hold for add-ons.   Final    Auto resulted.   •  Extra Tube 05/12/2022 hold for add-on   Final    Auto resulted   • Extra Tube 05/12/2022 Hold for add-ons.   Final    Auto resulted.   • Extra Tube 05/12/2022 Hold for add-ons.   Final    Auto resulted   Admission on 04/26/2022, Discharged on 04/26/2022   Component Date Value Ref Range Status   • Glucose 04/26/2022 70  65 - 99 mg/dL Final   • BUN 04/26/2022 3 (A) 6 - 20 mg/dL Final   • Creatinine 04/26/2022 0.52 (A) 0.57 - 1.00 mg/dL Final   • Sodium 04/26/2022 142  136 - 145 mmol/L Final   • Potassium 04/26/2022 3.6  3.5 - 5.2 mmol/L Final    Slight hemolysis detected by analyzer. Results may be affected.   • Chloride 04/26/2022 106  98 - 107 mmol/L Final   • CO2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

## 2022-07-30 ENCOUNTER — APPOINTMENT (OUTPATIENT)
Dept: GENERAL RADIOLOGY | Facility: HOSPITAL | Age: 47
End: 2022-07-30

## 2022-07-30 ENCOUNTER — APPOINTMENT (OUTPATIENT)
Dept: CT IMAGING | Facility: HOSPITAL | Age: 47
End: 2022-07-30

## 2022-07-30 ENCOUNTER — HOSPITAL ENCOUNTER (EMERGENCY)
Facility: HOSPITAL | Age: 47
Discharge: HOME | End: 2022-07-30
Attending: EMERGENCY MEDICINE | Admitting: EMERGENCY MEDICINE

## 2022-07-30 VITALS
DIASTOLIC BLOOD PRESSURE: 92 MMHG | RESPIRATION RATE: 18 BRPM | WEIGHT: 122 LBS | SYSTOLIC BLOOD PRESSURE: 132 MMHG | OXYGEN SATURATION: 98 % | HEIGHT: 68 IN | HEART RATE: 83 BPM | TEMPERATURE: 98.4 F | BODY MASS INDEX: 18.49 KG/M2

## 2022-07-30 DIAGNOSIS — G43.909 MIGRAINE WITHOUT STATUS MIGRAINOSUS, NOT INTRACTABLE, UNSPECIFIED MIGRAINE TYPE: ICD-10-CM

## 2022-07-30 DIAGNOSIS — R07.9 CHEST PAIN, UNSPECIFIED TYPE: Primary | ICD-10-CM

## 2022-07-30 DIAGNOSIS — R55 SYNCOPE AND COLLAPSE: ICD-10-CM

## 2022-07-30 LAB
ALBUMIN SERPL-MCNC: 4.47 G/DL (ref 3.5–5.2)
ALBUMIN/GLOB SERPL: 2 G/DL
ALP SERPL-CCNC: 74 U/L (ref 39–117)
ALT SERPL W P-5'-P-CCNC: 39 U/L (ref 1–33)
AMPHET+METHAMPHET UR QL: NEGATIVE
AMPHETAMINES UR QL: NEGATIVE
ANION GAP SERPL CALCULATED.3IONS-SCNC: 9.2 MMOL/L (ref 5–15)
AST SERPL-CCNC: 37 U/L (ref 1–32)
BARBITURATES UR QL SCN: NEGATIVE
BASOPHILS # BLD AUTO: 0.01 10*3/MM3 (ref 0–0.2)
BASOPHILS NFR BLD AUTO: 0.2 % (ref 0–1.5)
BENZODIAZ UR QL SCN: NEGATIVE
BILIRUB SERPL-MCNC: 0.5 MG/DL (ref 0–1.2)
BUN SERPL-MCNC: 6 MG/DL (ref 6–20)
BUN/CREAT SERPL: 10.7 (ref 7–25)
BUPRENORPHINE SERPL-MCNC: NEGATIVE NG/ML
CALCIUM SPEC-SCNC: 9.7 MG/DL (ref 8.6–10.5)
CANNABINOIDS SERPL QL: POSITIVE
CHLORIDE SERPL-SCNC: 105 MMOL/L (ref 98–107)
CO2 SERPL-SCNC: 28.8 MMOL/L (ref 22–29)
COCAINE UR QL: NEGATIVE
CREAT SERPL-MCNC: 0.56 MG/DL (ref 0.57–1)
D DIMER PPP FEU-MCNC: 3.57 MCGFEU/ML (ref 0–0.5)
DEPRECATED RDW RBC AUTO: 44.7 FL (ref 37–54)
EGFRCR SERPLBLD CKD-EPI 2021: 113.4 ML/MIN/1.73
EOSINOPHIL # BLD AUTO: 0.1 10*3/MM3 (ref 0–0.4)
EOSINOPHIL NFR BLD AUTO: 2.1 % (ref 0.3–6.2)
ERYTHROCYTE [DISTWIDTH] IN BLOOD BY AUTOMATED COUNT: 13.1 % (ref 12.3–15.4)
GLOBULIN UR ELPH-MCNC: 2.2 GM/DL
GLUCOSE SERPL-MCNC: 101 MG/DL (ref 65–99)
HCT VFR BLD AUTO: 45 % (ref 34–46.6)
HGB BLD-MCNC: 14.8 G/DL (ref 12–15.9)
HOLD SPECIMEN: NORMAL
HOLD SPECIMEN: NORMAL
IMM GRANULOCYTES # BLD AUTO: 0.01 10*3/MM3 (ref 0–0.05)
IMM GRANULOCYTES NFR BLD AUTO: 0.2 % (ref 0–0.5)
LYMPHOCYTES # BLD AUTO: 1.52 10*3/MM3 (ref 0.7–3.1)
LYMPHOCYTES NFR BLD AUTO: 31.7 % (ref 19.6–45.3)
MAGNESIUM SERPL-MCNC: 1.9 MG/DL (ref 1.6–2.6)
MCH RBC QN AUTO: 30.5 PG (ref 26.6–33)
MCHC RBC AUTO-ENTMCNC: 32.9 G/DL (ref 31.5–35.7)
MCV RBC AUTO: 92.8 FL (ref 79–97)
METHADONE UR QL SCN: NEGATIVE
MONOCYTES # BLD AUTO: 0.28 10*3/MM3 (ref 0.1–0.9)
MONOCYTES NFR BLD AUTO: 5.8 % (ref 5–12)
NEUTROPHILS NFR BLD AUTO: 2.88 10*3/MM3 (ref 1.7–7)
NEUTROPHILS NFR BLD AUTO: 60 % (ref 42.7–76)
NRBC BLD AUTO-RTO: 0 /100 WBC (ref 0–0.2)
OPIATES UR QL: NEGATIVE
OXYCODONE UR QL SCN: POSITIVE
PCP UR QL SCN: NEGATIVE
PLATELET # BLD AUTO: 137 10*3/MM3 (ref 140–450)
PMV BLD AUTO: 10.7 FL (ref 6–12)
POTASSIUM SERPL-SCNC: 4.4 MMOL/L (ref 3.5–5.2)
PROPOXYPH UR QL: NEGATIVE
PROT SERPL-MCNC: 6.7 G/DL (ref 6–8.5)
RBC # BLD AUTO: 4.85 10*6/MM3 (ref 3.77–5.28)
SODIUM SERPL-SCNC: 143 MMOL/L (ref 136–145)
TRICYCLICS UR QL SCN: NEGATIVE
TROPONIN T SERPL-MCNC: <0.01 NG/ML (ref 0–0.03)
TROPONIN T SERPL-MCNC: <0.01 NG/ML (ref 0–0.03)
TSH SERPL DL<=0.05 MIU/L-ACNC: 0.71 UIU/ML (ref 0.27–4.2)
WBC NRBC COR # BLD: 4.8 10*3/MM3 (ref 3.4–10.8)
WHOLE BLOOD HOLD COAG: NORMAL
WHOLE BLOOD HOLD SPECIMEN: NORMAL

## 2022-07-30 PROCEDURE — 93005 ELECTROCARDIOGRAM TRACING: CPT | Performed by: STUDENT IN AN ORGANIZED HEALTH CARE EDUCATION/TRAINING PROGRAM

## 2022-07-30 PROCEDURE — 71045 X-RAY EXAM CHEST 1 VIEW: CPT

## 2022-07-30 PROCEDURE — 84443 ASSAY THYROID STIM HORMONE: CPT | Performed by: PHYSICIAN ASSISTANT

## 2022-07-30 PROCEDURE — 83735 ASSAY OF MAGNESIUM: CPT | Performed by: PHYSICIAN ASSISTANT

## 2022-07-30 PROCEDURE — 96376 TX/PRO/DX INJ SAME DRUG ADON: CPT

## 2022-07-30 PROCEDURE — 85379 FIBRIN DEGRADATION QUANT: CPT | Performed by: PHYSICIAN ASSISTANT

## 2022-07-30 PROCEDURE — 96375 TX/PRO/DX INJ NEW DRUG ADDON: CPT

## 2022-07-30 PROCEDURE — 25010000002 DEXAMETHASONE SODIUM PHOSPHATE 10 MG/ML SOLUTION: Performed by: EMERGENCY MEDICINE

## 2022-07-30 PROCEDURE — 80053 COMPREHEN METABOLIC PANEL: CPT | Performed by: PHYSICIAN ASSISTANT

## 2022-07-30 PROCEDURE — 96374 THER/PROPH/DIAG INJ IV PUSH: CPT

## 2022-07-30 PROCEDURE — 25010000002 DIPHENHYDRAMINE PER 50 MG: Performed by: EMERGENCY MEDICINE

## 2022-07-30 PROCEDURE — 84484 ASSAY OF TROPONIN QUANT: CPT | Performed by: PHYSICIAN ASSISTANT

## 2022-07-30 PROCEDURE — 80306 DRUG TEST PRSMV INSTRMNT: CPT | Performed by: PHYSICIAN ASSISTANT

## 2022-07-30 PROCEDURE — 0 IOPAMIDOL PER 1 ML: Performed by: EMERGENCY MEDICINE

## 2022-07-30 PROCEDURE — 36415 COLL VENOUS BLD VENIPUNCTURE: CPT

## 2022-07-30 PROCEDURE — 25010000002 BUTORPHANOL PER 1 MG: Performed by: EMERGENCY MEDICINE

## 2022-07-30 PROCEDURE — 70450 CT HEAD/BRAIN W/O DYE: CPT

## 2022-07-30 PROCEDURE — 85025 COMPLETE CBC W/AUTO DIFF WBC: CPT | Performed by: PHYSICIAN ASSISTANT

## 2022-07-30 PROCEDURE — 99284 EMERGENCY DEPT VISIT MOD MDM: CPT

## 2022-07-30 PROCEDURE — 63710000001 PROMETHAZINE PER 25 MG: Performed by: EMERGENCY MEDICINE

## 2022-07-30 PROCEDURE — 71275 CT ANGIOGRAPHY CHEST: CPT

## 2022-07-30 RX ORDER — DEXAMETHASONE SODIUM PHOSPHATE 10 MG/ML
10 INJECTION, SOLUTION INTRAMUSCULAR; INTRAVENOUS ONCE
Status: COMPLETED | OUTPATIENT
Start: 2022-07-30 | End: 2022-07-30

## 2022-07-30 RX ORDER — DIPHENHYDRAMINE HYDROCHLORIDE 50 MG/ML
25 INJECTION INTRAMUSCULAR; INTRAVENOUS ONCE
Status: COMPLETED | OUTPATIENT
Start: 2022-07-30 | End: 2022-07-30

## 2022-07-30 RX ORDER — BUTORPHANOL TARTRATE 1 MG/ML
1 INJECTION, SOLUTION INTRAMUSCULAR; INTRAVENOUS ONCE
Status: COMPLETED | OUTPATIENT
Start: 2022-07-30 | End: 2022-07-30

## 2022-07-30 RX ORDER — ASPIRIN 81 MG/1
324 TABLET, CHEWABLE ORAL ONCE
Status: COMPLETED | OUTPATIENT
Start: 2022-07-30 | End: 2022-07-30

## 2022-07-30 RX ORDER — PROMETHAZINE HYDROCHLORIDE 25 MG/1
12.5 TABLET ORAL ONCE
Status: COMPLETED | OUTPATIENT
Start: 2022-07-30 | End: 2022-07-30

## 2022-07-30 RX ORDER — SODIUM CHLORIDE 0.9 % (FLUSH) 0.9 %
10 SYRINGE (ML) INJECTION AS NEEDED
Status: DISCONTINUED | OUTPATIENT
Start: 2022-07-30 | End: 2022-07-30 | Stop reason: HOSPADM

## 2022-07-30 RX ADMIN — SODIUM CHLORIDE 1000 ML: 9 INJECTION, SOLUTION INTRAVENOUS at 11:36

## 2022-07-30 RX ADMIN — PROMETHAZINE HYDROCHLORIDE 12.5 MG: 25 TABLET ORAL at 12:30

## 2022-07-30 RX ADMIN — BUTORPHANOL TARTRATE 1 MG: 1 INJECTION, SOLUTION INTRAMUSCULAR; INTRAVENOUS at 12:31

## 2022-07-30 RX ADMIN — BUTORPHANOL TARTRATE 1 MG: 1 INJECTION, SOLUTION INTRAMUSCULAR; INTRAVENOUS at 14:34

## 2022-07-30 RX ADMIN — ASPIRIN 324 MG: 81 TABLET, CHEWABLE ORAL at 11:36

## 2022-07-30 RX ADMIN — DEXAMETHASONE SODIUM PHOSPHATE 10 MG: 10 INJECTION INTRAMUSCULAR; INTRAVENOUS at 12:31

## 2022-07-30 RX ADMIN — DIPHENHYDRAMINE HYDROCHLORIDE 25 MG: 50 INJECTION INTRAMUSCULAR; INTRAVENOUS at 12:31

## 2022-07-30 RX ADMIN — IOPAMIDOL 61 ML: 755 INJECTION, SOLUTION INTRAVENOUS at 12:52

## 2022-08-02 ENCOUNTER — TELEPHONE (OUTPATIENT)
Dept: UROLOGY | Facility: CLINIC | Age: 47
End: 2022-08-02

## 2022-08-03 LAB
QT INTERVAL: 400 MS
QTC INTERVAL: 428 MS

## 2022-08-04 DIAGNOSIS — N23 RENAL COLIC: ICD-10-CM

## 2022-08-04 RX ORDER — OXYCODONE HYDROCHLORIDE AND ACETAMINOPHEN 5; 325 MG/1; MG/1
1 TABLET ORAL EVERY 8 HOURS PRN
Qty: 12 TABLET | Refills: 0 | Status: SHIPPED | OUTPATIENT
Start: 2022-08-04

## 2022-08-12 ENCOUNTER — HOSPITAL ENCOUNTER (EMERGENCY)
Facility: HOSPITAL | Age: 47
Discharge: HOME | End: 2022-08-12
Attending: STUDENT IN AN ORGANIZED HEALTH CARE EDUCATION/TRAINING PROGRAM | Admitting: STUDENT IN AN ORGANIZED HEALTH CARE EDUCATION/TRAINING PROGRAM

## 2022-08-12 VITALS
DIASTOLIC BLOOD PRESSURE: 79 MMHG | OXYGEN SATURATION: 98 % | HEIGHT: 68 IN | BODY MASS INDEX: 18.49 KG/M2 | RESPIRATION RATE: 16 BRPM | HEART RATE: 74 BPM | TEMPERATURE: 97.8 F | SYSTOLIC BLOOD PRESSURE: 122 MMHG | WEIGHT: 122 LBS

## 2022-08-12 DIAGNOSIS — N20.1 URETEROLITHIASIS: Primary | ICD-10-CM

## 2022-08-12 DIAGNOSIS — R31.0 GROSS HEMATURIA: ICD-10-CM

## 2022-08-12 LAB
ALBUMIN SERPL-MCNC: 4.44 G/DL (ref 3.5–5.2)
ALBUMIN/GLOB SERPL: 2 G/DL
ALP SERPL-CCNC: 71 U/L (ref 39–117)
ALT SERPL W P-5'-P-CCNC: 33 U/L (ref 1–33)
ANION GAP SERPL CALCULATED.3IONS-SCNC: 10.6 MMOL/L (ref 5–15)
AST SERPL-CCNC: 34 U/L (ref 1–32)
BACTERIA UR QL AUTO: ABNORMAL /HPF
BASOPHILS # BLD AUTO: 0 10*3/MM3 (ref 0–0.2)
BASOPHILS NFR BLD AUTO: 0 % (ref 0–1.5)
BILIRUB SERPL-MCNC: 0.8 MG/DL (ref 0–1.2)
BILIRUB UR QL STRIP: NEGATIVE
BUN SERPL-MCNC: 7 MG/DL (ref 6–20)
BUN/CREAT SERPL: 13.2 (ref 7–25)
CALCIUM SPEC-SCNC: 9.3 MG/DL (ref 8.6–10.5)
CHLORIDE SERPL-SCNC: 102 MMOL/L (ref 98–107)
CLARITY UR: ABNORMAL
CO2 SERPL-SCNC: 23.4 MMOL/L (ref 22–29)
COLOR UR: ABNORMAL
CREAT SERPL-MCNC: 0.53 MG/DL (ref 0.57–1)
CRP SERPL-MCNC: <0.3 MG/DL (ref 0–0.5)
DEPRECATED RDW RBC AUTO: 43.9 FL (ref 37–54)
EGFRCR SERPLBLD CKD-EPI 2021: 115 ML/MIN/1.73
EOSINOPHIL # BLD AUTO: 0.07 10*3/MM3 (ref 0–0.4)
EOSINOPHIL NFR BLD AUTO: 1.1 % (ref 0.3–6.2)
ERYTHROCYTE [DISTWIDTH] IN BLOOD BY AUTOMATED COUNT: 13.1 % (ref 12.3–15.4)
GLOBULIN UR ELPH-MCNC: 2.3 GM/DL
GLUCOSE SERPL-MCNC: 98 MG/DL (ref 65–99)
GLUCOSE UR STRIP-MCNC: NEGATIVE MG/DL
HCT VFR BLD AUTO: 43 % (ref 34–46.6)
HGB BLD-MCNC: 14.3 G/DL (ref 12–15.9)
HGB UR QL STRIP.AUTO: ABNORMAL
HOLD SPECIMEN: NORMAL
HOLD SPECIMEN: NORMAL
HYALINE CASTS UR QL AUTO: ABNORMAL /LPF
IMM GRANULOCYTES # BLD AUTO: 0.02 10*3/MM3 (ref 0–0.05)
IMM GRANULOCYTES NFR BLD AUTO: 0.3 % (ref 0–0.5)
KETONES UR QL STRIP: NEGATIVE
LEUKOCYTE ESTERASE UR QL STRIP.AUTO: ABNORMAL
LYMPHOCYTES # BLD AUTO: 1.52 10*3/MM3 (ref 0.7–3.1)
LYMPHOCYTES NFR BLD AUTO: 23.9 % (ref 19.6–45.3)
MCH RBC QN AUTO: 30.4 PG (ref 26.6–33)
MCHC RBC AUTO-ENTMCNC: 33.3 G/DL (ref 31.5–35.7)
MCV RBC AUTO: 91.5 FL (ref 79–97)
MONOCYTES # BLD AUTO: 0.33 10*3/MM3 (ref 0.1–0.9)
MONOCYTES NFR BLD AUTO: 5.2 % (ref 5–12)
NEUTROPHILS NFR BLD AUTO: 4.43 10*3/MM3 (ref 1.7–7)
NEUTROPHILS NFR BLD AUTO: 69.5 % (ref 42.7–76)
NITRITE UR QL STRIP: NEGATIVE
NRBC BLD AUTO-RTO: 0 /100 WBC (ref 0–0.2)
PH UR STRIP.AUTO: 6 [PH] (ref 5–8)
PLATELET # BLD AUTO: 160 10*3/MM3 (ref 140–450)
PMV BLD AUTO: 10 FL (ref 6–12)
POTASSIUM SERPL-SCNC: 3.6 MMOL/L (ref 3.5–5.2)
PROT SERPL-MCNC: 6.7 G/DL (ref 6–8.5)
PROT UR QL STRIP: ABNORMAL
RBC # BLD AUTO: 4.7 10*6/MM3 (ref 3.77–5.28)
RBC # UR STRIP: ABNORMAL /HPF
REF LAB TEST METHOD: ABNORMAL
SODIUM SERPL-SCNC: 136 MMOL/L (ref 136–145)
SP GR UR STRIP: 1.01 (ref 1–1.03)
SQUAMOUS #/AREA URNS HPF: ABNORMAL /HPF
UROBILINOGEN UR QL STRIP: ABNORMAL
WBC # UR STRIP: ABNORMAL /HPF
WBC NRBC COR # BLD: 6.37 10*3/MM3 (ref 3.4–10.8)
WHOLE BLOOD HOLD COAG: NORMAL
WHOLE BLOOD HOLD SPECIMEN: NORMAL

## 2022-08-12 PROCEDURE — 85025 COMPLETE CBC W/AUTO DIFF WBC: CPT | Performed by: STUDENT IN AN ORGANIZED HEALTH CARE EDUCATION/TRAINING PROGRAM

## 2022-08-12 PROCEDURE — 25010000002 MORPHINE PER 10 MG: Performed by: STUDENT IN AN ORGANIZED HEALTH CARE EDUCATION/TRAINING PROGRAM

## 2022-08-12 PROCEDURE — 63710000001 PROMETHAZINE PER 25 MG: Performed by: STUDENT IN AN ORGANIZED HEALTH CARE EDUCATION/TRAINING PROGRAM

## 2022-08-12 PROCEDURE — 36415 COLL VENOUS BLD VENIPUNCTURE: CPT

## 2022-08-12 PROCEDURE — 81001 URINALYSIS AUTO W/SCOPE: CPT | Performed by: STUDENT IN AN ORGANIZED HEALTH CARE EDUCATION/TRAINING PROGRAM

## 2022-08-12 PROCEDURE — 86140 C-REACTIVE PROTEIN: CPT | Performed by: STUDENT IN AN ORGANIZED HEALTH CARE EDUCATION/TRAINING PROGRAM

## 2022-08-12 PROCEDURE — 99283 EMERGENCY DEPT VISIT LOW MDM: CPT

## 2022-08-12 PROCEDURE — 25010000002 DIPHENHYDRAMINE PER 50 MG: Performed by: STUDENT IN AN ORGANIZED HEALTH CARE EDUCATION/TRAINING PROGRAM

## 2022-08-12 PROCEDURE — 96374 THER/PROPH/DIAG INJ IV PUSH: CPT

## 2022-08-12 PROCEDURE — 80053 COMPREHEN METABOLIC PANEL: CPT | Performed by: STUDENT IN AN ORGANIZED HEALTH CARE EDUCATION/TRAINING PROGRAM

## 2022-08-12 PROCEDURE — 25010000002 HEPARIN LOCK FLUSH PER 10 UNITS: Performed by: STUDENT IN AN ORGANIZED HEALTH CARE EDUCATION/TRAINING PROGRAM

## 2022-08-12 RX ORDER — ACETAMINOPHEN 500 MG
1000 TABLET ORAL ONCE
Status: COMPLETED | OUTPATIENT
Start: 2022-08-12 | End: 2022-08-12

## 2022-08-12 RX ORDER — PROMETHAZINE HYDROCHLORIDE 25 MG/1
25 TABLET ORAL ONCE
Status: COMPLETED | OUTPATIENT
Start: 2022-08-12 | End: 2022-08-12

## 2022-08-12 RX ORDER — HEPARIN SODIUM (PORCINE) LOCK FLUSH IV SOLN 100 UNIT/ML 100 UNIT/ML
300 SOLUTION INTRAVENOUS ONCE
Status: COMPLETED | OUTPATIENT
Start: 2022-08-12 | End: 2022-08-12

## 2022-08-12 RX ORDER — HEPARIN SODIUM (PORCINE) LOCK FLUSH IV SOLN 100 UNIT/ML 100 UNIT/ML
SOLUTION INTRAVENOUS
Status: DISCONTINUED
Start: 2022-08-12 | End: 2022-08-12 | Stop reason: WASHOUT

## 2022-08-12 RX ORDER — DIPHENHYDRAMINE HYDROCHLORIDE 50 MG/ML
25 INJECTION INTRAMUSCULAR; INTRAVENOUS ONCE
Status: COMPLETED | OUTPATIENT
Start: 2022-08-12 | End: 2022-08-12

## 2022-08-12 RX ORDER — TERAZOSIN 2 MG/1
2 CAPSULE ORAL NIGHTLY
Qty: 14 CAPSULE | Refills: 0 | Status: SHIPPED | OUTPATIENT
Start: 2022-08-12 | End: 2022-08-26

## 2022-08-12 RX ADMIN — HEPARIN SODIUM (PORCINE) LOCK FLUSH IV SOLN 100 UNIT/ML 300 UNITS: 100 SOLUTION at 12:22

## 2022-08-12 RX ADMIN — DIPHENHYDRAMINE HYDROCHLORIDE 25 MG: 50 INJECTION INTRAMUSCULAR; INTRAVENOUS at 11:28

## 2022-08-12 RX ADMIN — MORPHINE SULFATE 4 MG: 4 INJECTION, SOLUTION INTRAMUSCULAR; INTRAVENOUS at 11:27

## 2022-08-12 RX ADMIN — SODIUM CHLORIDE 1000 ML: 9 INJECTION, SOLUTION INTRAVENOUS at 11:28

## 2022-08-12 RX ADMIN — PROMETHAZINE HYDROCHLORIDE 25 MG: 25 TABLET ORAL at 11:27

## 2022-08-12 RX ADMIN — ACETAMINOPHEN 1000 MG: 500 TABLET ORAL at 11:27

## 2022-08-12 NOTE — ED PROVIDER NOTES
[facility]  emergency department encounter        Pt Name: Susanna Camilo  MRN: [id number]  Birthdate 1975  Date of evaluation: 8/12/2022    Chief Complaint   Patient presents with   • Flank Pain   • Blood in Urine             HISTORY OF PRESENT ILLNESS:   Susanna Camilo is a 47 y.o. female PMH significant for meningioma, DDD, Buerger's disease, IBS, fibromyalgia.  Bipolar, PTSD.    Patient presents with acute onset right flank pain abdominal pain and gross hematuria.  New onset since yesterday.  Symptoms constant, progressively worsening.  Has chronic recurrent renal stones with frequent ureteral dilation by urology Dr. Blunt.  Due for her next dilation in 12 days.  Has had numerous stones in the past but no surgical intervention required in the past few years.      No other exacerbating or alleviating factors other than as noted above.  Severity: Severe    PCP: Mabel Patterson APRN          REVIEW OF SYSTEMS:     Review of Systems   Constitutional: Negative for fever.   HENT: Negative for congestion and rhinorrhea.    Eyes: Negative for visual disturbance.   Respiratory: Negative for cough and shortness of breath.    Cardiovascular: Negative for chest pain.   Gastrointestinal: Positive for abdominal pain, nausea and vomiting.   Genitourinary: Positive for flank pain and hematuria. Negative for dysuria.   Musculoskeletal: Negative for myalgias.   Skin: Negative for rash.   Neurological: Negative for headaches.   Psychiatric/Behavioral: Negative for confusion.       Review of systems otherwise as per HPI.          PREVIOUS HISTORY:         Past Medical History:   Diagnosis Date   • Abdominal pain    • Abdominal swelling    • Hoyos's disease    • Bipolar 1 disorder (HCC)    • Brain tumor (benign) (HCC)    • Brain tumor (HCC)     R Frontal Lobe per pt   • Brain tumor (HCC) 2014   • Constipation    • DDD (degenerative disc disease), cervical 05/29/2017   • DDD (degenerative disc disease),  cervical    • Diarrhea    • Fibromyalgia    • IBS (irritable bowel syndrome)    • Migraine    • Nausea & vomiting    • PONV (postoperative nausea and vomiting)    • PTSD (post-traumatic stress disorder)    • Rectal bleeding            Past Surgical History:   Procedure Laterality Date   • ANAL SCOPE N/A 2016    Procedure: ANAL SCOPE;  Surgeon: Kael Lopez MD;  Location: [facility] OR;  Service:    • APPENDECTOMY     • COLONOSCOPY N/A 2016    Procedure: COLONOSCOPY  CPTCODE:47483;  Surgeon: Jose Antonio Belle III, MD;  Location: [facility] OR;  Service:    • COLONOSCOPY N/A 2016    Procedure: COLONOSCOPY (34065) CPT;  Surgeon: Jose Antonio Belle III, MD;  Location: [facility] OR;  Service:    • CYSTOSCOPY RETROGRADE PYELOGRAM Bilateral 2017    Procedure: CYSTOSCOPY RETROGRADE PYELOGRAM;  Surgeon: Mu Blunt MD;  Location: [facility] OR;  Service:    • ENDOSCOPY N/A 2016    Procedure: ESOPHAGOGASTRODUODENOSCOPY WITH BIOPSY  CPTCODE:98343;  Surgeon: Jose Antonio Belle III, MD;  Location: [facility] OR;  Service:    • HEMORRHOIDECTOMY N/A 2016    Procedure: HEMORRHOID STAPLING;  Surgeon: Kael Lopez MD;  Location: [facility] OR;  Service:    • HYSTERECTOMY     • KNEE SURGERY     • LAPAROSCOPIC SALPINGOOPHERECTOMY     • PORTACATH PLACEMENT N/A 2017    Procedure: INSERTION OF PORTACATH;  Surgeon: Celso Arredondo MD;  Location: [facility] OR;  Service:    • SHOULDER SURGERY      3 times           Social History     Socioeconomic History   • Marital status:    Tobacco Use   • Smoking status: Former Smoker     Packs/day: 1.00     Years: 20.00     Pack years: 20.00     Quit date: 2015     Years since quittin.7   • Smokeless tobacco: Never Used   Vaping Use   • Vaping Use: Never used   Substance and Sexual Activity   • Alcohol use: No   • Drug use: Yes     Types: Marijuana     Comment: for pain   • Sexual activity: Defer     Birth control/protection: Surgical           Family  History   Problem Relation Age of Onset   • Crohn's disease Other    • Hypertension Other    • Diabetes Other    • Irritable bowel syndrome Other    • No Known Problems Father    • No Known Problems Mother          Current Outpatient Medications   Medication Instructions   • albuterol (PROVENTIL HFA;VENTOLIN HFA) 108 (90 Base) MCG/ACT inhaler 2 puffs, Inhalation, 2 Times Daily   • Azelastine HCl 137 MCG/SPRAY solution 1 spray, Nasal, As Needed   • busPIRone (BUSPAR) 15 mg, Oral, 3 Times Daily   • ciprofloxacin (CIPRO) 500 mg, Oral, 2 Times Daily   • diclofenac (VOLTAREN) 1 % gel gel No dose, route, or frequency recorded.   • divalproex (DEPAKOTE) 500 mg, Oral, 3 Times Daily   • docusate sodium (COLACE) 100 mg, Oral, 2 Times Daily   • docusate sodium (COLACE) 100 mg, Oral, 2 Times Daily PRN   • fluticasone (VERAMYST) 27.5 MCG/SPRAY nasal spray 2 sprays, Nasal, Daily   • megestrol (MEGACE) 20 mg, Oral, Daily   • metroNIDAZOLE (FLAGYL) 500 mg, Oral, 3 Times Daily   • montelukast (SINGULAIR) 10 mg, Oral, Nightly   • oxyCODONE-acetaminophen (Percocet)  MG per tablet 1 tablet, Oral, Every 6 Hours PRN   • oxyCODONE-acetaminophen (PERCOCET) 5-325 MG per tablet 1 tablet, Oral, Every 8 Hours PRN   • pantoprazole (PROTONIX) 40 mg, Oral, 2 Times Daily PRN   • phenazopyridine (PYRIDIUM) 100 MG tablet No dose, route, or frequency recorded.   • polyethylene glycol (MIRALAX) 17 g, Oral, Daily   • predniSONE (DELTASONE) 50 mg, Oral, Daily   • promethazine (PHENERGAN) 25 mg, Oral, Every 6 Hours PRN   • sertraline (ZOLOFT) 100 mg, Oral, 2 Times Daily   • SUMAtriptan (IMITREX) 6 mg, Subcutaneous, Once   • terazosin (HYTRIN) 2 mg, Oral, Nightly   • traMADol (ULTRAM) 50 MG tablet No dose, route, or frequency recorded.         Allergies:  Ativan [lorazepam], Sulfa antibiotics, Sulfa antibiotics, Reglan [metoclopramide], Compazine [prochlorperazine edisylate], Demerol [meperidine], Droperidol, Metoclopramide, Toradol [ketorolac  tromethamine], Toradol [ketorolac tromethamine], Zofran [ondansetron hcl], and Zosyn [piperacillin sod-tazobactam so]    Medications, allergies and past medical, surgical, family, and social history reviewed.        PHYSICAL EXAM:     Physical Exam  Vitals and nursing note reviewed.   Constitutional:       General: She is not in acute distress.  HENT:      Head: Normocephalic and atraumatic.   Eyes:      Extraocular Movements: Extraocular movements intact.      Conjunctiva/sclera: Conjunctivae normal.   Cardiovascular:      Rate and Rhythm: Normal rate and regular rhythm.   Pulmonary:      Effort: Pulmonary effort is normal. No respiratory distress.   Abdominal:      General: Abdomen is flat. There is no distension.   Musculoskeletal:         General: No deformity. Normal range of motion.      Cervical back: Normal range of motion. No rigidity.   Neurological:      General: No focal deficit present.      Mental Status: She is alert and oriented to person, place, and time.   Psychiatric:         Mood and Affect: Mood normal.         Behavior: Behavior normal.             COMPLETED DIAGNOSTIC STUDIES AND INTERVENTIONS:     Lab Results (last 24 hours)     Procedure Component Value Units Date/Time    Urinalysis With Microscopic If Indicated (No Culture) - Urine, Clean Catch [[id number]]  (Abnormal) Collected: 08/12/22 1051    Specimen: Urine, Clean Catch Updated: 08/12/22 1115     Color, UA [place]     Appearance, UA Cloudy     pH, UA 6.0     Specific Gravity, UA 1.011     Glucose, UA Negative     Ketones, UA Negative     Bilirubin, UA Negative     Blood, UA Large (3+)     Protein, UA Trace     Leuk Esterase, UA Trace     Nitrite, UA Negative     Urobilinogen, UA 1.0 E.U./dL    Urinalysis, Microscopic Only - Urine, Clean Catch [[id number]]  (Abnormal) Collected: 08/12/22 1051    Specimen: Urine, Clean Catch Updated: 08/12/22 1115     RBC, UA Too Numerous to Count /HPF      WBC, UA 3-5 /HPF      Bacteria, UA None Seen /HPF       Squamous Epithelial Cells, UA 0-2 /HPF      Hyaline Casts, UA None Seen /LPF      Methodology Automated Microscopy    CBC & Differential [[id number]]  (Normal) Collected: 08/12/22 1116    Specimen: Blood Updated: 08/12/22 1124    Narrative:      The following orders were created for panel order CBC & Differential.  Procedure                               Abnormality         Status                     ---------                               -----------         ------                     CBC Auto Differential[[id number]]        Normal              Final result                 Please view results for these tests on the individual orders.    Comprehensive Metabolic Panel [[id number]]  (Abnormal) Collected: 08/12/22 1116    Specimen: Blood Updated: 08/12/22 1147     Glucose 98 mg/dL      BUN 7 mg/dL      Creatinine 0.53 mg/dL      Sodium 136 mmol/L      Potassium 3.6 mmol/L      Comment: Slight hemolysis detected by analyzer. Results may be affected.        Chloride 102 mmol/L      CO2 23.4 mmol/L      Calcium 9.3 mg/dL      Total Protein 6.7 g/dL      Albumin 4.44 g/dL      ALT (SGPT) 33 U/L      AST (SGOT) 34 U/L      Alkaline Phosphatase 71 U/L      Total Bilirubin 0.8 mg/dL      Globulin 2.3 gm/dL      A/G Ratio 2.0 g/dL      BUN/Creatinine Ratio 13.2     Anion Gap 10.6 mmol/L      eGFR 115.0 mL/min/1.73      Comment: National Kidney Foundation and American Society of Nephrology (ASN) Task Force recommended calculation based on the Chronic Kidney Disease Epidemiology Collaboration (CKD-EPI) equation refit without adjustment for race.       Narrative:      GFR Normal >60  Chronic Kidney Disease <60  Kidney Failure <15      C-reactive Protein [[id number]]  (Normal) Collected: 08/12/22 1116    Specimen: Blood Updated: 08/12/22 1147     C-Reactive Protein <0.30 mg/dL     CBC Auto Differential [[id number]]  (Normal) Collected: 08/12/22 1116    Specimen: Blood Updated: 08/12/22 1124     WBC 6.37 10*3/mm3      RBC 4.70  10*6/mm3      Hemoglobin 14.3 g/dL      Hematocrit 43.0 %      MCV 91.5 fL      MCH 30.4 pg      MCHC 33.3 g/dL      RDW 13.1 %      RDW-SD 43.9 fl      MPV 10.0 fL      Platelets 160 10*3/mm3      Neutrophil % 69.5 %      Lymphocyte % 23.9 %      Monocyte % 5.2 %      Eosinophil % 1.1 %      Basophil % 0.0 %      Immature Grans % 0.3 %      Neutrophils, Absolute 4.43 10*3/mm3      Lymphocytes, Absolute 1.52 10*3/mm3      Monocytes, Absolute 0.33 10*3/mm3      Eosinophils, Absolute 0.07 10*3/mm3      Basophils, Absolute 0.00 10*3/mm3      Immature Grans, Absolute 0.02 10*3/mm3      nRBC 0.0 /100 WBC             No orders to display         New Medications Ordered This Visit   Medications   • sodium chloride 0.9 % bolus 1,000 mL   • morphine injection 4 mg   • acetaminophen (TYLENOL) tablet 1,000 mg   • diphenhydrAMINE (BENADRYL) injection 25 mg   • promethazine (PHENERGAN) tablet 25 mg   • terazosin (HYTRIN) 2 MG capsule     Sig: Take 1 capsule by mouth Every Night for 14 days.     Dispense:  14 capsule     Refill:  0         Procedures            MEDICAL DECISION-MAKING AND ED COURSE:     ED Course as of 08/12/22 1156   Fri Aug 12, 2022   1118 RBC, UA(!): Too Numerous to Count [KP]   1118 WBC, UA(!): 3-5 [KP]   1118 Appearance, UA(!): Cloudy [KP]   1119 Nitrite, UA: Negative [KP]   1139 WBC: 6.37 [KP]   1139 Hemoglobin: 14.3 [KP]   1139 Platelets: 160 [KP]   1152 MDM: 47-year-old female presents with pains similar to prior episodes of ureterolithiasis.  She has gross hematuria.  UA negative for evidence of infection.  Discussed benefits and risks of CT scan however as patient has not had any Doans requiring surgical intervention in the past few years, determined to proceed with trial of void.  Terzosin prescribed.  Patient to follow-up with Dr. Blunt, agreeable to plan, all questions answered. [KP]   1155 C-Reactive Protein: <0.30 [KP]   1155 Creatinine(!): 0.53 [KP]      ED Course User Index  [KP] Ankit Gleason  MD JOSE       ?      FINAL IMPRESSION:       1. Ureterolithiasis    2. Gross hematuria         The complaints listed here are new problems to this examiner.      FOLLOW-UP  Mu Blunt MD  [address] [facility] [address]  [place] [id number] [phone]    Schedule an appointment as soon as possible for a visit         DISPOSITION  ED Disposition     ED Disposition   Discharge    Condition   Stable    Comment   --                   This care is provided during an unprecedented national emergency due to the Novel Coronavirus (COVID-19). COVID-19 infections and transmission risks place heavy strains on healthcare resources. As this pandemic evolves, the Hospital and providers strive to respond fluidly, to remain operational, and to provide care relative to available resources and information. Outcomes are unpredictable and treatments are without well-defined guidelines. Further, the impact of COVID-19 on all aspects of emergency care, including the impact to patients seeking care for reasons other than COVID-19, is unavoidable during this national emergency.    This note was dictated using a [id number] tool. Occasional wrong-word or 'sound-a-like' substitutions may have occurred due to the inherent limitations of voice recognition software. ?Read the chart carefully and recognize, using context, where substitutions have occurred.    Ankit Gleason MD  11:55 EDT  8/12/2022             Ankit Gleason MD  08/12/22 1156

## 2022-08-18 ENCOUNTER — OFFICE VISIT (OUTPATIENT)
Dept: UROLOGY | Facility: CLINIC | Age: 47
End: 2022-08-18

## 2022-08-18 VITALS — HEIGHT: 68 IN | BODY MASS INDEX: 18.64 KG/M2 | WEIGHT: 123 LBS

## 2022-08-18 DIAGNOSIS — N23 RENAL COLIC: ICD-10-CM

## 2022-08-18 DIAGNOSIS — N35.028 OTHER POST-TRAUMATIC URETHRAL STRICTURE, FEMALE: Primary | ICD-10-CM

## 2022-08-18 PROCEDURE — 53661 DILATION OF URETHRA: CPT | Performed by: UROLOGY

## 2022-08-18 RX ORDER — OXYCODONE AND ACETAMINOPHEN 10; 325 MG/1; MG/1
1 TABLET ORAL EVERY 6 HOURS PRN
Qty: 20 TABLET | Refills: 0 | Status: SHIPPED | OUTPATIENT
Start: 2022-08-18 | End: 2022-09-13 | Stop reason: SDUPTHER

## 2022-08-18 NOTE — PROGRESS NOTES
Chief Complaint:      Chief Complaint   Patient presents with   • Cystitis     Dilation        HPI:   47 y.o. female here for urethral dilation.    Past Medical History:     Past Medical History:   Diagnosis Date   • Abdominal pain    • Abdominal swelling    • Hoyos's disease    • Bipolar 1 disorder (HCC)    • Brain tumor (benign) (HCC)    • Brain tumor (HCC)     R Frontal Lobe per pt   • Brain tumor (HCC) 2014   • Constipation    • DDD (degenerative disc disease), cervical 05/29/2017   • DDD (degenerative disc disease), cervical    • Diarrhea    • Fibromyalgia    • IBS (irritable bowel syndrome)    • Migraine    • Nausea & vomiting    • PONV (postoperative nausea and vomiting)    • PTSD (post-traumatic stress disorder)    • Rectal bleeding        Current Meds:     Current Outpatient Medications   Medication Sig Dispense Refill   • albuterol (PROVENTIL HFA;VENTOLIN HFA) 108 (90 Base) MCG/ACT inhaler Inhale 2 puffs 2 (Two) Times a Day.     • Azelastine HCl 137 MCG/SPRAY solution 1 spray into each nostril As Needed (Allergies).     • busPIRone (BUSPAR) 15 MG tablet Take 15 mg by mouth 3 (three) times a day        • ciprofloxacin (CIPRO) 500 MG tablet Take 1 tablet by mouth 2 (Two) Times a Day. 19 tablet 0   • diclofenac (VOLTAREN) 1 % gel gel      • divalproex (DEPAKOTE) 500 MG DR tablet Take 1 tablet by mouth 3 (Three) Times a Day. 90 tablet 2   • docusate sodium (COLACE) 100 MG capsule Take 1 capsule by mouth 2 (Two) Times a Day. 20 capsule 0   • docusate sodium (COLACE) 100 MG capsule Take 1 capsule by mouth 2 (Two) Times a Day As Needed for Constipation. 60 capsule 0   • fluticasone (VERAMYST) 27.5 MCG/SPRAY nasal spray 2 sprays into each nostril Daily.     • megestrol (MEGACE) 20 MG tablet Take 1 tablet by mouth Daily. 30 tablet 3   • metroNIDAZOLE (FLAGYL) 500 MG tablet Take 1 tablet by mouth 3 (Three) Times a Day. 30 tablet 0   • montelukast (SINGULAIR) 10 MG tablet Take 10 mg by mouth Every Night.     •  oxyCODONE-acetaminophen (Percocet)  MG per tablet Take 1 tablet by mouth Every 6 (Six) Hours As Needed for Moderate Pain . 20 tablet 0   • oxyCODONE-acetaminophen (PERCOCET) 5-325 MG per tablet Take 1 tablet by mouth Every 8 (Eight) Hours As Needed for Moderate Pain . 12 tablet 0   • pantoprazole (PROTONIX) 40 MG EC tablet Take 40 mg by mouth 2 (Two) Times a Day As Needed.     • phenazopyridine (PYRIDIUM) 100 MG tablet      • polyethylene glycol (MIRALAX) 17 GM/SCOOP powder Take 17 g by mouth Daily. 225 g 0   • predniSONE (DELTASONE) 50 MG tablet Take 1 tablet by mouth Daily. 5 tablet 0   • promethazine (PHENERGAN) 25 MG tablet Take 1 tablet by mouth Every 6 (Six) Hours As Needed for Nausea or Vomiting. 21 tablet 2   • sertraline (ZOLOFT) 100 MG tablet Take 100 mg by mouth 2 (Two) Times a Day.     • SUMAtriptan (IMITREX) 6 MG/0.5ML injection Inject 6 mg under the skin 1 (One) Time.     • terazosin (HYTRIN) 2 MG capsule Take 1 capsule by mouth Every Night for 14 days. 14 capsule 0   • traMADol (ULTRAM) 50 MG tablet        No current facility-administered medications for this visit.        Allergies:      Allergies   Allergen Reactions   • Ativan [Lorazepam] Hallucinations     confusion   • Sulfa Antibiotics Shortness Of Breath and Swelling   • Sulfa Antibiotics Anaphylaxis   • Reglan [Metoclopramide] Angioedema   • Compazine [Prochlorperazine Edisylate] Hives   • Demerol [Meperidine] Hives   • Droperidol Itching   • Metoclopramide Swelling   • Toradol [Ketorolac Tromethamine] Hives and Itching   • Toradol [Ketorolac Tromethamine] Hives   • Zofran [Ondansetron Hcl] Rash   • Zosyn [Piperacillin Sod-Tazobactam So] Hives        Past Surgical History:     Past Surgical History:   Procedure Laterality Date   • ANAL SCOPE N/A 7/28/2016    Procedure: ANAL SCOPE;  Surgeon: Kael Lopez MD;  Location: [facility];  Service:    • APPENDECTOMY     • COLONOSCOPY N/A 6/30/2016    Procedure: COLONOSCOPY  CPTCODE:14028;   Surgeon: Jose Antonio Belle III, MD;  Location: [facility] OR;  Service:    • COLONOSCOPY N/A 2016    Procedure: COLONOSCOPY (25289) CPT;  Surgeon: Jose Antonio Belle III, MD;  Location: [facility] OR;  Service:    • CYSTOSCOPY RETROGRADE PYELOGRAM Bilateral 2017    Procedure: CYSTOSCOPY RETROGRADE PYELOGRAM;  Surgeon: Mu Blunt MD;  Location: [facility] OR;  Service:    • ENDOSCOPY N/A 2016    Procedure: ESOPHAGOGASTRODUODENOSCOPY WITH BIOPSY  CPTCODE:60036;  Surgeon: Jose Antonio Belle III, MD;  Location: [facility] OR;  Service:    • HEMORRHOIDECTOMY N/A 2016    Procedure: HEMORRHOID STAPLING;  Surgeon: Kael Lopez MD;  Location: [facility] OR;  Service:    • HYSTERECTOMY     • KNEE SURGERY     • LAPAROSCOPIC SALPINGOOPHERECTOMY     • PORTACATH PLACEMENT N/A 2017    Procedure: INSERTION OF PORTACATH;  Surgeon: Celso Arredondo MD;  Location: [facility];  Service:    • SHOULDER SURGERY      3 times       Social History:     Social History     Socioeconomic History   • Marital status:    Tobacco Use   • Smoking status: Former Smoker     Packs/day: 1.00     Years: 20.00     Pack years: 20.00     Quit date: 2015     Years since quittin.8   • Smokeless tobacco: Never Used   Vaping Use   • Vaping Use: Never used   Substance and Sexual Activity   • Alcohol use: No   • Drug use: Yes     Types: Marijuana     Comment: for pain   • Sexual activity: Defer     Birth control/protection: Surgical       Family History:     Family History   Problem Relation Age of Onset   • Crohn's disease Other    • Hypertension Other    • Diabetes Other    • Irritable bowel syndrome Other    • No Known Problems Father    • No Known Problems Mother        Review of Systems:     Review of Systems   Constitutional: Negative.  Negative for activity change, appetite change, chills, diaphoresis, fatigue and unexpected weight change.   HENT: Negative for congestion, dental problem, drooling, ear discharge,  ear pain, facial swelling, hearing loss, mouth sores, nosebleeds, postnasal drip, rhinorrhea, sinus pressure, sneezing, sore throat, tinnitus, trouble swallowing and voice change.    Eyes: Negative.  Negative for photophobia, pain, discharge, redness, itching and visual disturbance.   Respiratory: Negative.  Negative for apnea, cough, choking, chest tightness, shortness of breath, wheezing and stridor.    Cardiovascular: Negative.  Negative for chest pain, palpitations and leg swelling.   Gastrointestinal: Negative.  Negative for abdominal distention, abdominal pain, anal bleeding, blood in stool, constipation, diarrhea, nausea, rectal pain and vomiting.   Endocrine: Negative.  Negative for cold intolerance, heat intolerance, polydipsia, polyphagia and polyuria.   Genitourinary: Positive for difficulty urinating.   Musculoskeletal: Negative.  Negative for arthralgias, back pain, gait problem, joint swelling, myalgias, neck pain and neck stiffness.   Skin: Negative.  Negative for color change, pallor, rash and wound.   Allergic/Immunologic: Negative.  Negative for environmental allergies, food allergies and immunocompromised state.   Neurological: Negative.  Negative for dizziness, tremors, seizures, syncope, facial asymmetry, speech difficulty, weakness, light-headedness, numbness and headaches.   Hematological: Negative.  Negative for adenopathy. Does not bruise/bleed easily.   Psychiatric/Behavioral: Negative for agitation, behavioral problems, confusion, decreased concentration, dysphoric mood, hallucinations, self-injury, sleep disturbance and suicidal ideas. The patient is not nervous/anxious and is not hyperactive.    All other systems reviewed and are negative.      Physical Exam:     Physical Exam  Constitutional:       Appearance: She is well-developed.   HENT:      Head: Normocephalic and atraumatic.      Right Ear: External ear normal.      Left Ear: External ear normal.   Eyes:      Conjunctiva/sclera:  Conjunctivae normal.      Pupils: Pupils are equal, round, and reactive to light.   Cardiovascular:      Rate and Rhythm: Normal rate and regular rhythm.      Heart sounds: Normal heart sounds.   Pulmonary:      Effort: Pulmonary effort is normal.      Breath sounds: Normal breath sounds.   Abdominal:      General: Bowel sounds are normal. There is no distension.      Palpations: Abdomen is soft. There is no mass.      Tenderness: There is no abdominal tenderness. There is no guarding or rebound.   Genitourinary:     General: Normal vulva.      Vagina: No vaginal discharge.   Musculoskeletal:         General: Normal range of motion.   Skin:     General: Skin is warm and dry.   Neurological:      Mental Status: She is alert.      Deep Tendon Reflexes: Reflexes are normal and symmetric.   Psychiatric:         Behavior: Behavior normal.         Thought Content: Thought content normal.         Judgment: Judgment normal.         I have reviewed the following portions of the patient's history: Allergies, current medications, past family history, past medical history, past social history, past surgical history, problem list, and ROS and confirm it is accurate.    Recent Image (CT and/or KUB):      CT Abdomen and Pelvis: No results found for this or any previous visit.       CT Stone Protocol: Results for orders placed during the hospital encounter of 03/29/22    CT Abdomen Pelvis Stone Protocol    Narrative  CT Abdomen Pelvis WO    INDICATION:  Right flank pain today    TECHNIQUE:  CT of the abdomen and pelvis without IV contrast. Coronal and sagittal reconstructions were obtained.  Radiation dose reduction techniques included automated exposure control or exposure modulation based on body size. Count of known CT and cardiac nuc  med studies performed in previous 12 months: 2.    COMPARISON:  3/7/2022    FINDINGS:  Abdomen: Lung bases are clear the liver, gallbladder, spleen, pancreas, adrenal glands and kidneys are  normal. There are no urinary stones or hydronephrosis identified. The aorta is normal in size. There is no adenopathy. Bowel is unremarkable. The  appendix is visible on image 75. There is no evidence of appendicitis..    Pelvis: Bladder is normal. Uterus is been removed. There are no adnexal masses. Bones are unremarkable.    Impression  No urinary stones are identified. There is no evidence of appendicitis but study is limited by lack of IV contrast oral contrast and fat. I do believe I can see the appendix on image 75          Signer Name: Luis Hale MD  Signed: 3/29/2022 6:41 PM  Workstation Name: [id number]       KUB: Results for orders placed during the hospital encounter of 03/12/17    XR Abdomen KUB    Narrative  EXAMINATION: XR ABDOMEN KUB-    CLINICAL INDICATION: flank pain      COMPARISON: None available    FINDINGS: 2 views of the abdomen show a moderate to large amount of  stool in the colon.    No evidence of bowel obstruction.    Calcifications in the pelvis probably phleboliths.    This report was finalized on 3/12/2017 8:11 PM by Dr. Luis Fernando Macdonald MD.       Labs (past 3 months):      Admission on 08/12/2022, Discharged on 08/12/2022   Component Date Value Ref Range Status   • Color, UA 08/12/2022 Orange (A) Yellow, Straw Final   • Appearance, UA 08/12/2022 Cloudy (A) Clear Final   • pH, UA 08/12/2022 6.0  5.0 - 8.0 Final   • Specific Gravity, UA 08/12/2022 1.011  1.005 - 1.030 Final   • Glucose, UA 08/12/2022 Negative  Negative Final   • Ketones, UA 08/12/2022 Negative  Negative Final   • Bilirubin, UA 08/12/2022 Negative  Negative Final   • Blood, UA 08/12/2022 Large (3+) (A) Negative Final   • Protein, UA 08/12/2022 Trace (A) Negative Final   • Leuk Esterase, UA 08/12/2022 Trace (A) Negative Final   • Nitrite, UA 08/12/2022 Negative  Negative Final   • Urobilinogen, UA 08/12/2022 1.0 E.U./dL  0.2 - 1.0 E.U./dL Final   • Extra Tube 08/12/2022 Hold for add-ons.    Final    Auto resulted.   • Extra Tube 08/12/2022 hold for add-on   Final    Auto resulted   • Extra Tube 08/12/2022 Hold for add-ons.   Final    Auto resulted.   • Extra Tube 08/12/2022 Hold for add-ons.   Final    Auto resulted   • RBC, UA 08/12/2022 Too Numerous to Count (A) None Seen, 0-2 /HPF Final   • WBC, UA 08/12/2022 3-5 (A) None Seen, 0-2 /HPF Final   • Bacteria, UA 08/12/2022 None Seen  None Seen /HPF Final   • Squamous Epithelial Cells, UA 08/12/2022 0-2  None Seen, 0-2 /HPF Final   • Hyaline Casts, UA 08/12/2022 None Seen  None Seen /LPF Final   • Methodology 08/12/2022 Automated Microscopy   Final   • Glucose 08/12/2022 98  65 - 99 mg/dL Final   • BUN 08/12/2022 7  6 - 20 mg/dL Final   • Creatinine 08/12/2022 0.53 (A) 0.57 - 1.00 mg/dL Final   • Sodium 08/12/2022 136  136 - 145 mmol/L Final   • Potassium 08/12/2022 3.6  3.5 - 5.2 mmol/L Final    Slight hemolysis detected by analyzer. Results may be affected.   • Chloride 08/12/2022 102  98 - 107 mmol/L Final   • CO2 08/12/2022 23.4  22.0 - 29.0 mmol/L Final   • Calcium 08/12/2022 9.3  8.6 - 10.5 mg/dL Final   • Total Protein 08/12/2022 6.7  6.0 - 8.5 g/dL Final   • Albumin 08/12/2022 4.44  3.50 - 5.20 g/dL Final   • ALT (SGPT) 08/12/2022 33  1 - 33 U/L Final   • AST (SGOT) 08/12/2022 34 (A) 1 - 32 U/L Final   • Alkaline Phosphatase 08/12/2022 71  39 - 117 U/L Final   • Total Bilirubin 08/12/2022 0.8  0.0 - 1.2 mg/dL Final   • Globulin 08/12/2022 2.3  gm/dL Final   • A/G Ratio 08/12/2022 2.0  g/dL Final   • BUN/Creatinine Ratio 08/12/2022 13.2  7.0 - 25.0 Final   • Anion Gap 08/12/2022 10.6  5.0 - 15.0 mmol/L Final   • eGFR 08/12/2022 115.0  >60.0 mL/min/1.73 Final    National Kidney Foundation and American Society of Nephrology (ASN) Task Force recommended calculation based on the Chronic Kidney Disease Epidemiology Collaboration (CKD-EPI) equation refit without adjustment for race.   • C-Reactive Protein 08/12/2022 <0.30  0.00 - 0.50 mg/dL Final   •  WBC 08/12/2022 6.37  3.40 - 10.80 10*3/mm3 Final   • RBC 08/12/2022 4.70  3.77 - 5.28 10*6/mm3 Final   • Hemoglobin 08/12/2022 14.3  12.0 - 15.9 g/dL Final   • Hematocrit 08/12/2022 43.0  34.0 - 46.6 % Final   • MCV 08/12/2022 91.5  79.0 - 97.0 fL Final   • MCH 08/12/2022 30.4  26.6 - 33.0 pg Final   • MCHC 08/12/2022 33.3  31.5 - 35.7 g/dL Final   • RDW 08/12/2022 13.1  12.3 - 15.4 % Final   • RDW-SD 08/12/2022 43.9  37.0 - 54.0 fl Final   • MPV 08/12/2022 10.0  6.0 - 12.0 fL Final   • Platelets 08/12/2022 160  140 - 450 10*3/mm3 Final   • Neutrophil % 08/12/2022 69.5  42.7 - 76.0 % Final   • Lymphocyte % 08/12/2022 23.9  19.6 - 45.3 % Final   • Monocyte % 08/12/2022 5.2  5.0 - 12.0 % Final   • Eosinophil % 08/12/2022 1.1  0.3 - 6.2 % Final   • Basophil % 08/12/2022 0.0  0.0 - 1.5 % Final   • Immature Grans % 08/12/2022 0.3  0.0 - 0.5 % Final   • Neutrophils, Absolute 08/12/2022 4.43  1.70 - 7.00 10*3/mm3 Final   • Lymphocytes, Absolute 08/12/2022 1.52  0.70 - 3.10 10*3/mm3 Final   • Monocytes, Absolute 08/12/2022 0.33  0.10 - 0.90 10*3/mm3 Final   • Eosinophils, Absolute 08/12/2022 0.07  0.00 - 0.40 10*3/mm3 Final   • Basophils, Absolute 08/12/2022 0.00  0.00 - 0.20 10*3/mm3 Final   • Immature Grans, Absolute 08/12/2022 0.02  0.00 - 0.05 10*3/mm3 Final   • nRBC 08/12/2022 0.0  0.0 - 0.2 /100 WBC Final   Admission on 07/30/2022, Discharged on 07/30/2022   Component Date Value Ref Range Status   • Glucose 07/30/2022 101 (A) 65 - 99 mg/dL Final   • BUN 07/30/2022 6  6 - 20 mg/dL Final   • Creatinine 07/30/2022 0.56 (A) 0.57 - 1.00 mg/dL Final   • Sodium 07/30/2022 143  136 - 145 mmol/L Final   • Potassium 07/30/2022 4.4  3.5 - 5.2 mmol/L Final   • Chloride 07/30/2022 105  98 - 107 mmol/L Final   • CO2 07/30/2022 28.8  22.0 - 29.0 mmol/L Final   • Calcium 07/30/2022 9.7  8.6 - 10.5 mg/dL Final   • Total Protein 07/30/2022 6.7  6.0 - 8.5 g/dL Final   • Albumin 07/30/2022 4.47  3.50 - 5.20 g/dL Final   • ALT (SGPT)  07/30/2022 39 (A) 1 - 33 U/L Final   • AST (SGOT) 07/30/2022 37 (A) 1 - 32 U/L Final   • Alkaline Phosphatase 07/30/2022 74  39 - 117 U/L Final   • Total Bilirubin 07/30/2022 0.5  0.0 - 1.2 mg/dL Final   • Globulin 07/30/2022 2.2  gm/dL Final   • A/G Ratio 07/30/2022 2.0  g/dL Final   • BUN/Creatinine Ratio 07/30/2022 10.7  7.0 - 25.0 Final   • Anion Gap 07/30/2022 9.2  5.0 - 15.0 mmol/L Final   • eGFR 07/30/2022 113.4  >60.0 mL/min/1.73 Final    National Kidney Foundation and American Society of Nephrology (ASN) Task Force recommended calculation based on the Chronic Kidney Disease Epidemiology Collaboration (CKD-EPI) equation refit without adjustment for race.   • Troponin T 07/30/2022 <0.010  0.000 - 0.030 ng/mL Final   • Troponin T 07/30/2022 <0.010  0.000 - 0.030 ng/mL Final   • TSH 07/30/2022 0.711  0.270 - 4.200 uIU/mL Final   • Magnesium 07/30/2022 1.9  1.6 - 2.6 mg/dL Final   • Extra Tube 07/30/2022 Hold for add-ons.   Final    Auto resulted.   • Extra Tube 07/30/2022 hold for add-on   Final    Auto resulted   • Extra Tube 07/30/2022 Hold for add-ons.   Final    Auto resulted.   • Extra Tube 07/30/2022 Hold for add-ons.   Final    Auto resulted   • WBC 07/30/2022 4.80  3.40 - 10.80 10*3/mm3 Final   • RBC 07/30/2022 4.85  3.77 - 5.28 10*6/mm3 Final   • Hemoglobin 07/30/2022 14.8  12.0 - 15.9 g/dL Final   • Hematocrit 07/30/2022 45.0  34.0 - 46.6 % Final   • MCV 07/30/2022 92.8  79.0 - 97.0 fL Final   • MCH 07/30/2022 30.5  26.6 - 33.0 pg Final   • MCHC 07/30/2022 32.9  31.5 - 35.7 g/dL Final   • RDW 07/30/2022 13.1  12.3 - 15.4 % Final   • RDW-SD 07/30/2022 44.7  37.0 - 54.0 fl Final   • MPV 07/30/2022 10.7  6.0 - 12.0 fL Final   • Platelets 07/30/2022 137 (A) 140 - 450 10*3/mm3 Final   • Neutrophil % 07/30/2022 60.0  42.7 - 76.0 % Final   • Lymphocyte % 07/30/2022 31.7  19.6 - 45.3 % Final   • Monocyte % 07/30/2022 5.8  5.0 - 12.0 % Final   • Eosinophil % 07/30/2022 2.1  0.3 - 6.2 % Final   • Basophil %  07/30/2022 0.2  0.0 - 1.5 % Final   • Immature Grans % 07/30/2022 0.2  0.0 - 0.5 % Final   • Neutrophils, Absolute 07/30/2022 2.88  1.70 - 7.00 10*3/mm3 Final   • Lymphocytes, Absolute 07/30/2022 1.52  0.70 - 3.10 10*3/mm3 Final   • Monocytes, Absolute 07/30/2022 0.28  0.10 - 0.90 10*3/mm3 Final   • Eosinophils, Absolute 07/30/2022 0.10  0.00 - 0.40 10*3/mm3 Final   • Basophils, Absolute 07/30/2022 0.01  0.00 - 0.20 10*3/mm3 Final   • Immature Grans, Absolute 07/30/2022 0.01  0.00 - 0.05 10*3/mm3 Final   • nRBC 07/30/2022 0.0  0.0 - 0.2 /100 WBC Final   • D-Dimer, Quantitative 07/30/2022 3.57 (A) 0.00 - 0.50 MCGFEU/mL Final   • THC, Screen, Urine 07/30/2022 Positive (A) Negative Final   • Phencyclidine (PCP), Urine 07/30/2022 Negative  Negative Final   • Cocaine Screen, Urine 07/30/2022 Negative  Negative Final   • Methamphetamine, Ur 07/30/2022 Negative  Negative Final   • Opiate Screen 07/30/2022 Negative  Negative Final   • Amphetamine Screen, Urine 07/30/2022 Negative  Negative Final   • Benzodiazepine Screen, Urine 07/30/2022 Negative  Negative Final   • Tricyclic Antidepressants Screen 07/30/2022 Negative  Negative Final   • Methadone Screen, Urine 07/30/2022 Negative  Negative Final   • Barbiturates Screen, Urine 07/30/2022 Negative  Negative Final   • Oxycodone Screen, Urine 07/30/2022 Positive (A) Negative Final   • Propoxyphene Screen 07/30/2022 Negative  Negative Final   • Buprenorphine, Screen, Urine 07/30/2022 Negative  Negative Final   • QT Interval 07/30/2022 400  ms Corrected   • QTC Interval 07/30/2022 428  ms Corrected   Admission on 07/22/2022, Discharged on 07/22/2022   Component Date Value Ref Range Status   • QT Interval 07/22/2022 378  ms Final   • QTC Interval 07/22/2022 452  ms Final   • Glucose 07/22/2022 109 (A) 65 - 99 mg/dL Final   • BUN 07/22/2022 4 (A) 6 - 20 mg/dL Final   • Creatinine 07/22/2022 0.59  0.57 - 1.00 mg/dL Final   • Sodium 07/22/2022 141  136 - 145 mmol/L Final   •  Potassium 07/22/2022 3.3 (A) 3.5 - 5.2 mmol/L Final   • Chloride 07/22/2022 105  98 - 107 mmol/L Final   • CO2 07/22/2022 21.9 (A) 22.0 - 29.0 mmol/L Final   • Calcium 07/22/2022 9.2  8.6 - 10.5 mg/dL Final   • Total Protein 07/22/2022 6.2  6.0 - 8.5 g/dL Final   • Albumin 07/22/2022 4.09  3.50 - 5.20 g/dL Final   • ALT (SGPT) 07/22/2022 32  1 - 33 U/L Final   • AST (SGOT) 07/22/2022 30  1 - 32 U/L Final   • Alkaline Phosphatase 07/22/2022 63  39 - 117 U/L Final   • Total Bilirubin 07/22/2022 0.5  0.0 - 1.2 mg/dL Final   • Globulin 07/22/2022 2.1  gm/dL Final   • A/G Ratio 07/22/2022 1.9  g/dL Final   • BUN/Creatinine Ratio 07/22/2022 6.8 (A) 7.0 - 25.0 Final   • Anion Gap 07/22/2022 14.1  5.0 - 15.0 mmol/L Final   • eGFR 07/22/2022 112.0  >60.0 mL/min/1.73 Final    National Kidney Foundation and American Society of Nephrology (ASN) Task Force recommended calculation based on the Chronic Kidney Disease Epidemiology Collaboration (CKD-EPI) equation refit without adjustment for race.   • proBNP 07/22/2022 143.5  0.0 - 450.0 pg/mL Final   • Troponin T 07/22/2022 <0.010  0.000 - 0.030 ng/mL Final   • D-Dimer, Quantitative 07/22/2022 3.50 (A) 0.00 - 0.50 MCGFEU/mL Final   • C-Reactive Protein 07/22/2022 <0.30  0.00 - 0.50 mg/dL Final   • Lactate 07/22/2022 1.6  0.5 - 2.0 mmol/L Final   • Procalcitonin 07/22/2022 0.04  0.00 - 0.25 ng/mL Final   • THC, Screen, Urine 07/22/2022 Positive (A) Negative Final   • Phencyclidine (PCP), Urine 07/22/2022 Negative  Negative Final   • Cocaine Screen, Urine 07/22/2022 Negative  Negative Final   • Methamphetamine, Ur 07/22/2022 Positive (A) Negative Final   • Opiate Screen 07/22/2022 Negative  Negative Final   • Amphetamine Screen, Urine 07/22/2022 Positive (A) Negative Final   • Benzodiazepine Screen, Urine 07/22/2022 Negative  Negative Final   • Tricyclic Antidepressants Screen 07/22/2022 Negative  Negative Final   • Methadone Screen, Urine 07/22/2022 Negative  Negative Final   •  Barbiturates Screen, Urine 07/22/2022 Negative  Negative Final   • Oxycodone Screen, Urine 07/22/2022 Negative  Negative Final   • Propoxyphene Screen 07/22/2022 Negative  Negative Final   • Buprenorphine, Screen, Urine 07/22/2022 Negative  Negative Final   • COVID19 07/22/2022 Not Detected  Not Detected - Ref. Range Final   • Influenza A PCR 07/22/2022 Not Detected  Not Detected Final   • Influenza B PCR 07/22/2022 Not Detected  Not Detected Final   • Color, UA 07/22/2022 Yellow  Yellow, Straw Final   • Appearance, UA 07/22/2022 Clear  Clear Final   • pH, UA 07/22/2022 6.5  5.0 - 8.0 Final   • Specific Gravity, UA 07/22/2022 1.013  1.005 - 1.030 Final   • Glucose, UA 07/22/2022 Negative  Negative Final   • Ketones, UA 07/22/2022 Negative  Negative Final   • Bilirubin, UA 07/22/2022 Negative  Negative Final   • Blood, UA 07/22/2022 Negative  Negative Final   • Protein, UA 07/22/2022 Negative  Negative Final   • Leuk Esterase, UA 07/22/2022 Negative  Negative Final   • Nitrite, UA 07/22/2022 Negative  Negative Final   • Urobilinogen, UA 07/22/2022 1.0 E.U./dL  0.2 - 1.0 E.U./dL Final   • Extra Tube 07/22/2022 Hold for add-ons.   Final    Auto resulted.   • Extra Tube 07/22/2022 hold for add-on   Final    Auto resulted   • Extra Tube 07/22/2022 Hold for add-ons.   Final    Auto resulted.   • Extra Tube 07/22/2022 Hold for add-ons.   Final    Auto resulted   • WBC 07/22/2022 4.55  3.40 - 10.80 10*3/mm3 Final   • RBC 07/22/2022 4.73  3.77 - 5.28 10*6/mm3 Final   • Hemoglobin 07/22/2022 14.8  12.0 - 15.9 g/dL Final   • Hematocrit 07/22/2022 44.4  34.0 - 46.6 % Final   • MCV 07/22/2022 93.9  79.0 - 97.0 fL Final   • MCH 07/22/2022 31.3  26.6 - 33.0 pg Final   • MCHC 07/22/2022 33.3  31.5 - 35.7 g/dL Final   • RDW 07/22/2022 12.8  12.3 - 15.4 % Final   • RDW-SD 07/22/2022 44.2  37.0 - 54.0 fl Final   • MPV 07/22/2022 10.3  6.0 - 12.0 fL Final   • Platelets 07/22/2022 141  140 - 450 10*3/mm3 Final   • Neutrophil %  07/22/2022 52.9  42.7 - 76.0 % Final   • Lymphocyte % 07/22/2022 36.7  19.6 - 45.3 % Final   • Monocyte % 07/22/2022 7.3  5.0 - 12.0 % Final   • Eosinophil % 07/22/2022 2.9  0.3 - 6.2 % Final   • Basophil % 07/22/2022 0.2  0.0 - 1.5 % Final   • Immature Grans % 07/22/2022 0.0  0.0 - 0.5 % Final   • Neutrophils, Absolute 07/22/2022 2.41  1.70 - 7.00 10*3/mm3 Final   • Lymphocytes, Absolute 07/22/2022 1.67  0.70 - 3.10 10*3/mm3 Final   • Monocytes, Absolute 07/22/2022 0.33  0.10 - 0.90 10*3/mm3 Final   • Eosinophils, Absolute 07/22/2022 0.13  0.00 - 0.40 10*3/mm3 Final   • Basophils, Absolute 07/22/2022 0.01  0.00 - 0.20 10*3/mm3 Final   • Immature Grans, Absolute 07/22/2022 0.00  0.00 - 0.05 10*3/mm3 Final   • nRBC 07/22/2022 0.0  0.0 - 0.2 /100 WBC Final   Admission on 07/12/2022, Discharged on 07/12/2022   Component Date Value Ref Range Status   • Glucose 07/12/2022 80  65 - 99 mg/dL Final   • BUN 07/12/2022 5 (A) 6 - 20 mg/dL Final   • Creatinine 07/12/2022 0.60  0.57 - 1.00 mg/dL Final   • Sodium 07/12/2022 141  136 - 145 mmol/L Final   • Potassium 07/12/2022 4.0  3.5 - 5.2 mmol/L Final   • Chloride 07/12/2022 107  98 - 107 mmol/L Final   • CO2 07/12/2022 24.4  22.0 - 29.0 mmol/L Final   • Calcium 07/12/2022 9.2  8.6 - 10.5 mg/dL Final   • Total Protein 07/12/2022 6.5  6.0 - 8.5 g/dL Final   • Albumin 07/12/2022 4.35  3.50 - 5.20 g/dL Final   • ALT (SGPT) 07/12/2022 34 (A) 1 - 33 U/L Final   • AST (SGOT) 07/12/2022 28  1 - 32 U/L Final   • Alkaline Phosphatase 07/12/2022 60  39 - 117 U/L Final   • Total Bilirubin 07/12/2022 1.2  0.0 - 1.2 mg/dL Final   • Globulin 07/12/2022 2.2  gm/dL Final   • A/G Ratio 07/12/2022 2.0  g/dL Final   • BUN/Creatinine Ratio 07/12/2022 8.3  7.0 - 25.0 Final   • Anion Gap 07/12/2022 9.6  5.0 - 15.0 mmol/L Final   • eGFR 07/12/2022 111.6  >60.0 mL/min/1.73 Final    National Kidney Foundation and American Society of Nephrology (ASN) Task Force recommended calculation based on the  Chronic Kidney Disease Epidemiology Collaboration (CKD-EPI) equation refit without adjustment for race.   • C-Reactive Protein 07/12/2022 <0.30  0.00 - 0.50 mg/dL Final   • Sed Rate 07/12/2022 3  0 - 20 mm/hr Final   • THC, Screen, Urine 07/12/2022 Positive (A) Negative Final   • Phencyclidine (PCP), Urine 07/12/2022 Negative  Negative Final   • Cocaine Screen, Urine 07/12/2022 Negative  Negative Final   • Methamphetamine, Ur 07/12/2022 Positive (A) Negative Final   • Opiate Screen 07/12/2022 Positive (A) Negative Final   • Amphetamine Screen, Urine 07/12/2022 Positive (A) Negative Final   • Benzodiazepine Screen, Urine 07/12/2022 Negative  Negative Final   • Tricyclic Antidepressants Screen 07/12/2022 Negative  Negative Final   • Methadone Screen, Urine 07/12/2022 Negative  Negative Final   • Barbiturates Screen, Urine 07/12/2022 Negative  Negative Final   • Oxycodone Screen, Urine 07/12/2022 Negative  Negative Final   • Propoxyphene Screen 07/12/2022 Negative  Negative Final   • Buprenorphine, Screen, Urine 07/12/2022 Negative  Negative Final   • Color, UA 07/12/2022 Dark Yellow (A) Yellow, Straw Final   • Appearance, UA 07/12/2022 Clear  Clear Final   • pH, UA 07/12/2022 6.5  5.0 - 8.0 Final   • Specific Gravity, UA 07/12/2022 <=1.005  1.005 - 1.030 Final   • Glucose, UA 07/12/2022 Negative  Negative Final   • Ketones, UA 07/12/2022 Negative  Negative Final   • Bilirubin, UA 07/12/2022 Negative  Negative Final   • Blood, UA 07/12/2022 Negative  Negative Final   • Protein, UA 07/12/2022 Negative  Negative Final   • Leuk Esterase, UA 07/12/2022 Negative  Negative Final   • Nitrite, UA 07/12/2022 Negative  Negative Final   • Urobilinogen, UA 07/12/2022 1.0 E.U./dL  0.2 - 1.0 E.U./dL Final   • WBC 07/12/2022 5.03  3.40 - 10.80 10*3/mm3 Final   • RBC 07/12/2022 4.64  3.77 - 5.28 10*6/mm3 Final   • Hemoglobin 07/12/2022 14.4  12.0 - 15.9 g/dL Final   • Hematocrit 07/12/2022 41.9  34.0 - 46.6 % Final   • MCV 07/12/2022  90.3  79.0 - 97.0 fL Final   • MCH 07/12/2022 31.0  26.6 - 33.0 pg Final   • MCHC 07/12/2022 34.4  31.5 - 35.7 g/dL Final   • RDW 07/12/2022 12.7  12.3 - 15.4 % Final   • RDW-SD 07/12/2022 41.7  37.0 - 54.0 fl Final   • MPV 07/12/2022 10.8  6.0 - 12.0 fL Final   • Platelets 07/12/2022 145  140 - 450 10*3/mm3 Final   • Neutrophil % 07/12/2022 54.6  42.7 - 76.0 % Final   • Lymphocyte % 07/12/2022 35.0  19.6 - 45.3 % Final   • Monocyte % 07/12/2022 8.2  5.0 - 12.0 % Final   • Eosinophil % 07/12/2022 1.8  0.3 - 6.2 % Final   • Basophil % 07/12/2022 0.4  0.0 - 1.5 % Final   • Immature Grans % 07/12/2022 0.0  0.0 - 0.5 % Final   • Neutrophils, Absolute 07/12/2022 2.75  1.70 - 7.00 10*3/mm3 Final   • Lymphocytes, Absolute 07/12/2022 1.76  0.70 - 3.10 10*3/mm3 Final   • Monocytes, Absolute 07/12/2022 0.41  0.10 - 0.90 10*3/mm3 Final   • Eosinophils, Absolute 07/12/2022 0.09  0.00 - 0.40 10*3/mm3 Final   • Basophils, Absolute 07/12/2022 0.02  0.00 - 0.20 10*3/mm3 Final   • Immature Grans, Absolute 07/12/2022 0.00  0.00 - 0.05 10*3/mm3 Final   • nRBC 07/12/2022 0.0  0.0 - 0.2 /100 WBC Final        Procedure:   Urethral dilation-after an appropriate informed consent, the patient was brought to the procedure suite.  The urethra was gently anesthetized with 10 mL of 2% viscous Xylocaine jelly.  After an adequate period of topical anesthesia I went ahead and dilated with [place] sounds from 16 to 26 Hebrew sequentially without complication. The patient was given gentamicin as prophylaxis with 80 mg.    Assessment/Plan:   Urethral stricture-posttraumatic status post dilation  Narcotic pain medication-patient has significant acute pain that I believe would be an indication for the use of narcotic pain medication.  I discussed the significant risks of pain medication and the fact that this will be a short only option and I will give her no more than a three-day supply of pain medication, I will not plan long-term medication, and  that this will be sent to a pain clinic if it at all becomes necessary.  We discussed signing a pain medication agreement and the fact that we're going to run a state [place] review to be sure the patient is not getting pain medication from elsewhere.  If this is the case, we will not give pain medication as part of the patient's treatment plan of there being prescribed a controlled substance with potential for abuse.  This patient has been well aware of the appropriate dose of such medications including the risks for somnolence, limited ability to drive and/or safety and the significant potential for overdose.  It has been made clear that these medications are for the prescribed patient only without concomitant use of alcohol or other substance unless prescribed by the medical provider.  Has completed prescribing agreement detailing the terms of continue prescribing him a controlled substance including monitoring Luis Alberto reports, the possibility of urine drug screens, and pill counts.  The patient is aware that we review LUIS ALBERTO reports on a regular basis and scan them into the chart.  History and physical examination exhibited continued safe and appropriate use of controlled substances. We also discussed the fact that the new [place] legislation allows only a three-day prescription for pain medication.  In this situation he will be referred to a chronic pain clinic.                This document has been electronically signed by VERENICE FINK MD August 18, 2022 10:40 EDT    Dictated Utilizing Dragon Dictation: Part of this note may be an electronic transcription/translation of spoken language to printed text using the Dragon Dictation System.

## 2022-08-23 ENCOUNTER — HOSPITAL ENCOUNTER (EMERGENCY)
Facility: HOSPITAL | Age: 47
Discharge: HOME | End: 2022-08-24
Attending: STUDENT IN AN ORGANIZED HEALTH CARE EDUCATION/TRAINING PROGRAM | Admitting: STUDENT IN AN ORGANIZED HEALTH CARE EDUCATION/TRAINING PROGRAM

## 2022-08-23 VITALS
DIASTOLIC BLOOD PRESSURE: 68 MMHG | HEIGHT: 68 IN | OXYGEN SATURATION: 98 % | BODY MASS INDEX: 18.49 KG/M2 | TEMPERATURE: 98 F | RESPIRATION RATE: 18 BRPM | SYSTOLIC BLOOD PRESSURE: 116 MMHG | HEART RATE: 96 BPM | WEIGHT: 122 LBS

## 2022-08-23 DIAGNOSIS — G43.909 MIGRAINE WITHOUT STATUS MIGRAINOSUS, NOT INTRACTABLE, UNSPECIFIED MIGRAINE TYPE: Primary | ICD-10-CM

## 2022-08-23 LAB
ALBUMIN SERPL-MCNC: 4.53 G/DL (ref 3.5–5.2)
ALBUMIN/GLOB SERPL: 2 G/DL
ALP SERPL-CCNC: 76 U/L (ref 39–117)
ALT SERPL W P-5'-P-CCNC: 26 U/L (ref 1–33)
ANION GAP SERPL CALCULATED.3IONS-SCNC: 9.2 MMOL/L (ref 5–15)
AST SERPL-CCNC: 27 U/L (ref 1–32)
BASOPHILS # BLD AUTO: 0.02 10*3/MM3 (ref 0–0.2)
BASOPHILS NFR BLD AUTO: 0.3 % (ref 0–1.5)
BILIRUB SERPL-MCNC: 0.5 MG/DL (ref 0–1.2)
BUN SERPL-MCNC: 11 MG/DL (ref 6–20)
BUN/CREAT SERPL: 18.3 (ref 7–25)
CALCIUM SPEC-SCNC: 9.4 MG/DL (ref 8.6–10.5)
CHLORIDE SERPL-SCNC: 104 MMOL/L (ref 98–107)
CO2 SERPL-SCNC: 24.8 MMOL/L (ref 22–29)
CREAT SERPL-MCNC: 0.6 MG/DL (ref 0.57–1)
DEPRECATED RDW RBC AUTO: 44.8 FL (ref 37–54)
EGFRCR SERPLBLD CKD-EPI 2021: 111.6 ML/MIN/1.73
EOSINOPHIL # BLD AUTO: 0.12 10*3/MM3 (ref 0–0.4)
EOSINOPHIL NFR BLD AUTO: 1.9 % (ref 0.3–6.2)
ERYTHROCYTE [DISTWIDTH] IN BLOOD BY AUTOMATED COUNT: 13.4 % (ref 12.3–15.4)
GLOBULIN UR ELPH-MCNC: 2.3 GM/DL
GLUCOSE SERPL-MCNC: 89 MG/DL (ref 65–99)
HCT VFR BLD AUTO: 41.7 % (ref 34–46.6)
HGB BLD-MCNC: 14.2 G/DL (ref 12–15.9)
HOLD SPECIMEN: NORMAL
HOLD SPECIMEN: NORMAL
IMM GRANULOCYTES # BLD AUTO: 0.01 10*3/MM3 (ref 0–0.05)
IMM GRANULOCYTES NFR BLD AUTO: 0.2 % (ref 0–0.5)
LYMPHOCYTES # BLD AUTO: 2.8 10*3/MM3 (ref 0.7–3.1)
LYMPHOCYTES NFR BLD AUTO: 43.7 % (ref 19.6–45.3)
MCH RBC QN AUTO: 31 PG (ref 26.6–33)
MCHC RBC AUTO-ENTMCNC: 34.1 G/DL (ref 31.5–35.7)
MCV RBC AUTO: 91 FL (ref 79–97)
MONOCYTES # BLD AUTO: 0.44 10*3/MM3 (ref 0.1–0.9)
MONOCYTES NFR BLD AUTO: 6.9 % (ref 5–12)
NEUTROPHILS NFR BLD AUTO: 3.02 10*3/MM3 (ref 1.7–7)
NEUTROPHILS NFR BLD AUTO: 47 % (ref 42.7–76)
NRBC BLD AUTO-RTO: 0 /100 WBC (ref 0–0.2)
PLATELET # BLD AUTO: 176 10*3/MM3 (ref 140–450)
PMV BLD AUTO: 10.1 FL (ref 6–12)
POTASSIUM SERPL-SCNC: 3.6 MMOL/L (ref 3.5–5.2)
PROT SERPL-MCNC: 6.8 G/DL (ref 6–8.5)
RBC # BLD AUTO: 4.58 10*6/MM3 (ref 3.77–5.28)
SODIUM SERPL-SCNC: 138 MMOL/L (ref 136–145)
WBC NRBC COR # BLD: 6.41 10*3/MM3 (ref 3.4–10.8)
WHOLE BLOOD HOLD COAG: NORMAL
WHOLE BLOOD HOLD SPECIMEN: NORMAL

## 2022-08-23 PROCEDURE — 96374 THER/PROPH/DIAG INJ IV PUSH: CPT

## 2022-08-23 PROCEDURE — 63710000001 PROMETHAZINE PER 25 MG: Performed by: STUDENT IN AN ORGANIZED HEALTH CARE EDUCATION/TRAINING PROGRAM

## 2022-08-23 PROCEDURE — 36415 COLL VENOUS BLD VENIPUNCTURE: CPT

## 2022-08-23 PROCEDURE — 25010000002 DEXAMETHASONE SODIUM PHOSPHATE 10 MG/ML SOLUTION: Performed by: STUDENT IN AN ORGANIZED HEALTH CARE EDUCATION/TRAINING PROGRAM

## 2022-08-23 PROCEDURE — 25010000002 DIPHENHYDRAMINE PER 50 MG: Performed by: STUDENT IN AN ORGANIZED HEALTH CARE EDUCATION/TRAINING PROGRAM

## 2022-08-23 PROCEDURE — 25010000002 MORPHINE PER 10 MG: Performed by: STUDENT IN AN ORGANIZED HEALTH CARE EDUCATION/TRAINING PROGRAM

## 2022-08-23 PROCEDURE — 85025 COMPLETE CBC W/AUTO DIFF WBC: CPT | Performed by: STUDENT IN AN ORGANIZED HEALTH CARE EDUCATION/TRAINING PROGRAM

## 2022-08-23 PROCEDURE — 80053 COMPREHEN METABOLIC PANEL: CPT | Performed by: STUDENT IN AN ORGANIZED HEALTH CARE EDUCATION/TRAINING PROGRAM

## 2022-08-23 PROCEDURE — 99283 EMERGENCY DEPT VISIT LOW MDM: CPT

## 2022-08-23 PROCEDURE — 96375 TX/PRO/DX INJ NEW DRUG ADDON: CPT

## 2022-08-23 RX ORDER — HYDROCODONE BITARTRATE AND ACETAMINOPHEN 5; 325 MG/1; MG/1
1 TABLET ORAL ONCE
Status: DISCONTINUED | OUTPATIENT
Start: 2022-08-23 | End: 2022-08-23

## 2022-08-23 RX ORDER — HEPARIN SODIUM (PORCINE) LOCK FLUSH IV SOLN 100 UNIT/ML 100 UNIT/ML
300 SOLUTION INTRAVENOUS ONCE
Status: COMPLETED | OUTPATIENT
Start: 2022-08-23 | End: 2022-08-24

## 2022-08-23 RX ORDER — DEXAMETHASONE SODIUM PHOSPHATE 10 MG/ML
6 INJECTION, SOLUTION INTRAMUSCULAR; INTRAVENOUS ONCE
Status: COMPLETED | OUTPATIENT
Start: 2022-08-23 | End: 2022-08-23

## 2022-08-23 RX ORDER — DIPHENHYDRAMINE HYDROCHLORIDE 50 MG/ML
25 INJECTION INTRAMUSCULAR; INTRAVENOUS ONCE
Status: COMPLETED | OUTPATIENT
Start: 2022-08-23 | End: 2022-08-23

## 2022-08-23 RX ORDER — PROMETHAZINE HYDROCHLORIDE 25 MG/1
25 TABLET ORAL ONCE
Status: COMPLETED | OUTPATIENT
Start: 2022-08-23 | End: 2022-08-23

## 2022-08-23 RX ORDER — SODIUM CHLORIDE 0.9 % (FLUSH) 0.9 %
10 SYRINGE (ML) INJECTION AS NEEDED
Status: DISCONTINUED | OUTPATIENT
Start: 2022-08-23 | End: 2022-08-24 | Stop reason: HOSPADM

## 2022-08-23 RX ADMIN — MORPHINE SULFATE 4 MG: 4 INJECTION, SOLUTION INTRAMUSCULAR; INTRAVENOUS at 23:16

## 2022-08-23 RX ADMIN — DIPHENHYDRAMINE HYDROCHLORIDE 25 MG: 50 INJECTION INTRAMUSCULAR; INTRAVENOUS at 22:47

## 2022-08-23 RX ADMIN — PROMETHAZINE HYDROCHLORIDE 25 MG: 25 TABLET ORAL at 22:47

## 2022-08-23 RX ADMIN — SODIUM CHLORIDE 1000 ML: 9 INJECTION, SOLUTION INTRAVENOUS at 23:18

## 2022-08-23 RX ADMIN — DEXAMETHASONE SODIUM PHOSPHATE 6 MG: 10 INJECTION INTRAMUSCULAR; INTRAVENOUS at 22:47

## 2022-08-24 PROCEDURE — 25010000002 HEPARIN LOCK FLUSH PER 10 UNITS: Performed by: STUDENT IN AN ORGANIZED HEALTH CARE EDUCATION/TRAINING PROGRAM

## 2022-08-24 RX ADMIN — HEPARIN SODIUM (PORCINE) LOCK FLUSH IV SOLN 100 UNIT/ML 300 UNITS: 100 SOLUTION at 00:04

## 2022-08-24 NOTE — ED PROVIDER NOTES
[facility]  emergency department encounter        Pt Name: Susanna Camilo  MRN: [id number]  Birthdate 1975  Date of evaluation: 8/23/2022    Chief Complaint   Patient presents with   • Migraine             HISTORY OF PRESENT ILLNESS:   Susanna Camilo is a 47 y.o. female PMH significant for meningioma, DDD, Buerger's disease, IBS, fibromyalgia.  Bipolar, PTSD.    Patient presents for migraine headache similar to numerous prior episodes.  Onset 5 days ago, constant, aching pain, progressively worsening.  Endorses concomitant spots in her vision which is again similar to prior migraine episodes. Broadly denies ROS otherwise.    No other exacerbating or alleviating factors other than as noted above.  Severity: Severe    PCP: Mabel Patterson APRN          REVIEW OF SYSTEMS:     Review of Systems   Constitutional: Negative for fever.   HENT: Negative for congestion and rhinorrhea.    Eyes: Positive for visual disturbance.   Respiratory: Negative for cough and shortness of breath.    Cardiovascular: Negative for chest pain.   Gastrointestinal: Negative for abdominal pain, nausea and vomiting.   Genitourinary: Negative for dysuria.   Musculoskeletal: Negative for myalgias.   Skin: Negative for rash.   Neurological: Positive for headaches.   Psychiatric/Behavioral: Negative for confusion.       Review of systems otherwise as per HPI.          PREVIOUS HISTORY:         Past Medical History:   Diagnosis Date   • Abdominal pain    • Abdominal swelling    • Hoyos's disease    • Bipolar 1 disorder (HCC)    • Brain tumor (benign) (HCC)    • Brain tumor (HCC)     R Frontal Lobe per pt   • Brain tumor (HCC) 2014   • Constipation    • DDD (degenerative disc disease), cervical 05/29/2017   • DDD (degenerative disc disease), cervical    • Diarrhea    • Fibromyalgia    • IBS (irritable bowel syndrome)    • Migraine    • Nausea & vomiting    • PONV (postoperative nausea and vomiting)    • PTSD (post-traumatic  stress disorder)    • Rectal bleeding            Past Surgical History:   Procedure Laterality Date   • ANAL SCOPE N/A 2016    Procedure: ANAL SCOPE;  Surgeon: Kael Lopez MD;  Location: [facility] OR;  Service:    • APPENDECTOMY     • COLONOSCOPY N/A 2016    Procedure: COLONOSCOPY  CPTCODE:31866;  Surgeon: Jose Antonio Belle III, MD;  Location: [facility] OR;  Service:    • COLONOSCOPY N/A 2016    Procedure: COLONOSCOPY (25445) CPT;  Surgeon: Jose Antonio Belle III, MD;  Location: [facility] OR;  Service:    • CYSTOSCOPY RETROGRADE PYELOGRAM Bilateral 2017    Procedure: CYSTOSCOPY RETROGRADE PYELOGRAM;  Surgeon: Mu Blunt MD;  Location: [facility] OR;  Service:    • ENDOSCOPY N/A 2016    Procedure: ESOPHAGOGASTRODUODENOSCOPY WITH BIOPSY  CPTCODE:28683;  Surgeon: Jose Antonio Belle III, MD;  Location: [facility] OR;  Service:    • HEMORRHOIDECTOMY N/A 2016    Procedure: HEMORRHOID STAPLING;  Surgeon: Kael Lopez MD;  Location: [facility] OR;  Service:    • HYSTERECTOMY     • KNEE SURGERY     • LAPAROSCOPIC SALPINGOOPHERECTOMY     • PORTACATH PLACEMENT N/A 2017    Procedure: INSERTION OF PORTACATH;  Surgeon: Celso Arredondo MD;  Location: [facility] OR;  Service:    • SHOULDER SURGERY      3 times           Social History     Socioeconomic History   • Marital status:    Tobacco Use   • Smoking status: Former Smoker     Packs/day: 1.00     Years: 20.00     Pack years: 20.00     Quit date: 2015     Years since quittin.8   • Smokeless tobacco: Never Used   Vaping Use   • Vaping Use: Never used   Substance and Sexual Activity   • Alcohol use: No   • Drug use: Yes     Types: Marijuana     Comment: for pain   • Sexual activity: Defer     Birth control/protection: Surgical           Family History   Problem Relation Age of Onset   • Crohn's disease Other    • Hypertension Other    • Diabetes Other    • Irritable bowel syndrome Other    • No Known Problems Father    • No  Known Problems Mother          Current Outpatient Medications   Medication Instructions   • albuterol (PROVENTIL HFA;VENTOLIN HFA) 108 (90 Base) MCG/ACT inhaler 2 puffs, Inhalation, 2 Times Daily   • Azelastine HCl 137 MCG/SPRAY solution 1 spray, Nasal, As Needed   • busPIRone (BUSPAR) 15 mg, Oral, 3 Times Daily   • ciprofloxacin (CIPRO) 500 mg, Oral, 2 Times Daily   • diclofenac (VOLTAREN) 1 % gel gel No dose, route, or frequency recorded.   • divalproex (DEPAKOTE) 500 mg, Oral, 3 Times Daily   • docusate sodium (COLACE) 100 mg, Oral, 2 Times Daily   • docusate sodium (COLACE) 100 mg, Oral, 2 Times Daily PRN   • fluticasone (VERAMYST) 27.5 MCG/SPRAY nasal spray 2 sprays, Nasal, Daily   • megestrol (MEGACE) 20 mg, Oral, Daily   • metroNIDAZOLE (FLAGYL) 500 mg, Oral, 3 Times Daily   • montelukast (SINGULAIR) 10 mg, Oral, Nightly   • oxyCODONE-acetaminophen (Percocet)  MG per tablet 1 tablet, Oral, Every 6 Hours PRN   • oxyCODONE-acetaminophen (PERCOCET) 5-325 MG per tablet 1 tablet, Oral, Every 8 Hours PRN   • pantoprazole (PROTONIX) 40 mg, Oral, 2 Times Daily PRN   • phenazopyridine (PYRIDIUM) 100 MG tablet No dose, route, or frequency recorded.   • polyethylene glycol (MIRALAX) 17 g, Oral, Daily   • predniSONE (DELTASONE) 50 mg, Oral, Daily   • promethazine (PHENERGAN) 25 mg, Oral, Every 6 Hours PRN   • sertraline (ZOLOFT) 100 mg, Oral, 2 Times Daily   • SUMAtriptan (IMITREX) 6 mg, Subcutaneous, Once   • terazosin (HYTRIN) 2 mg, Oral, Nightly   • traMADol (ULTRAM) 50 MG tablet No dose, route, or frequency recorded.         Allergies:  Ativan [lorazepam], Sulfa antibiotics, Sulfa antibiotics, Reglan [metoclopramide], Compazine [prochlorperazine edisylate], Demerol [meperidine], Droperidol, Metoclopramide, Toradol [ketorolac tromethamine], Toradol [ketorolac tromethamine], Zofran [ondansetron hcl], and Zosyn [piperacillin sod-tazobactam so]    Medications, allergies and past medical, surgical, family, and  social history reviewed.        PHYSICAL EXAM:     Physical Exam  Vitals and nursing note reviewed.   Constitutional:       General: She is not in acute distress.  HENT:      Head: Normocephalic and atraumatic.   Eyes:      Extraocular Movements: Extraocular movements intact.      Conjunctiva/sclera: Conjunctivae normal.   Cardiovascular:      Rate and Rhythm: Normal rate and regular rhythm.   Pulmonary:      Effort: Pulmonary effort is normal. No respiratory distress.   Abdominal:      General: Abdomen is flat. There is no distension.   Musculoskeletal:         General: No deformity. Normal range of motion.      Cervical back: Normal range of motion. No rigidity.   Neurological:      General: No focal deficit present.      Mental Status: She is alert and oriented to person, place, and time.   Psychiatric:         Mood and Affect: Mood normal.         Behavior: Behavior normal.             COMPLETED DIAGNOSTIC STUDIES AND INTERVENTIONS:     Lab Results (last 24 hours)     Procedure Component Value Units Date/Time    CBC & Differential [[id number]]  (Normal) Collected: 08/23/22 2239    Specimen: Blood Updated: 08/23/22 2245    Narrative:      The following orders were created for panel order CBC & Differential.  Procedure                               Abnormality         Status                     ---------                               -----------         ------                     CBC Auto Differential[[id number]]        Normal              Final result                 Please view results for these tests on the individual orders.    Comprehensive Metabolic Panel [[id number]] Collected: 08/23/22 2239    Specimen: Blood Updated: 08/23/22 2304     Glucose 89 mg/dL      BUN 11 mg/dL      Creatinine 0.60 mg/dL      Sodium 138 mmol/L      Potassium 3.6 mmol/L      Comment: Slight hemolysis detected by analyzer. Results may be affected.        Chloride 104 mmol/L      CO2 24.8 mmol/L      Calcium 9.4 mg/dL      Total Protein  6.8 g/dL      Albumin 4.53 g/dL      ALT (SGPT) 26 U/L      AST (SGOT) 27 U/L      Alkaline Phosphatase 76 U/L      Total Bilirubin 0.5 mg/dL      Globulin 2.3 gm/dL      A/G Ratio 2.0 g/dL      BUN/Creatinine Ratio 18.3     Anion Gap 9.2 mmol/L      eGFR 111.6 mL/min/1.73      Comment: National Kidney Foundation and American Society of Nephrology (ASN) Task Force recommended calculation based on the Chronic Kidney Disease Epidemiology Collaboration (CKD-EPI) equation refit without adjustment for race.       Narrative:      GFR Normal >60  Chronic Kidney Disease <60  Kidney Failure <15      CBC Auto Differential [[id number]]  (Normal) Collected: 08/23/22 2239    Specimen: Blood Updated: 08/23/22 2245     WBC 6.41 10*3/mm3      RBC 4.58 10*6/mm3      Hemoglobin 14.2 g/dL      Hematocrit 41.7 %      MCV 91.0 fL      MCH 31.0 pg      MCHC 34.1 g/dL      RDW 13.4 %      RDW-SD 44.8 fl      MPV 10.1 fL      Platelets 176 10*3/mm3      Neutrophil % 47.0 %      Lymphocyte % 43.7 %      Monocyte % 6.9 %      Eosinophil % 1.9 %      Basophil % 0.3 %      Immature Grans % 0.2 %      Neutrophils, Absolute 3.02 10*3/mm3      Lymphocytes, Absolute 2.80 10*3/mm3      Monocytes, Absolute 0.44 10*3/mm3      Eosinophils, Absolute 0.12 10*3/mm3      Basophils, Absolute 0.02 10*3/mm3      Immature Grans, Absolute 0.01 10*3/mm3      nRBC 0.0 /100 WBC             No orders to display         New Medications Ordered This Visit   Medications   • sodium chloride 0.9 % flush 10 mL   • sodium chloride 0.9 % bolus 1,000 mL   • promethazine (PHENERGAN) tablet 25 mg   • diphenhydrAMINE (BENADRYL) injection 25 mg   • dexamethasone sodium phosphate injection 6 mg   • morphine injection 4 mg         Procedures            MEDICAL DECISION-MAKING AND ED COURSE:     ED Course as of 08/23/22 2338   Tue Aug 23, 2022   2216 MDM: 47-year-old female with migraine headache for 5 days month similar to prior.  She adamantly declines CT imaging as there are no  changes relative to prior headaches.  Headache cocktail ordered with fluid bolus.  Will reassess. [KP]   2250 WBC: 6.41 [KP]   2250 Hemoglobin: 14.2 [KP]   2250 Platelets: 176 [KP]   2337 Patient reports headache resolved, will follow-up outpatient.  Agreeable to plan all questions answered. [KP]      ED Course User Index  [KP] Ankit Gleason MD       ?      FINAL IMPRESSION:       1. Migraine without status migrainosus, not intractable, unspecified migraine type         The complaints listed here are new problems to this examiner.      FOLLOW-UP  Mabel Patterson, APRN  [address]  [place] [address]  [phone]    Schedule an appointment as soon as possible for a visit   As needed      DISPOSITION  ED Disposition     ED Disposition   Discharge    Condition   Stable    Comment   --                   This care is provided during an unprecedented national emergency due to the Novel Coronavirus (COVID-19). COVID-19 infections and transmission risks place heavy strains on healthcare resources. As this pandemic evolves, the Hospital and providers strive to respond fluidly, to remain operational, and to provide care relative to available resources and information. Outcomes are unpredictable and treatments are without well-defined guidelines. Further, the impact of COVID-19 on all aspects of emergency care, including the impact to patients seeking care for reasons other than COVID-19, is unavoidable during this national emergency.    This note was dictated using a [id number] tool. Occasional wrong-word or 'sound-a-like' substitutions may have occurred due to the inherent limitations of voice recognition software. ?Read the chart carefully and recognize, using context, where substitutions have occurred.    Ankit Gleason MD  23:38 EDT  8/23/2022             Ankit Gleason MD  08/23/22 6716

## 2022-08-25 ENCOUNTER — TELEPHONE (OUTPATIENT)
Dept: UROLOGY | Facility: CLINIC | Age: 47
End: 2022-08-25

## 2022-08-25 DIAGNOSIS — N23 RENAL COLIC: ICD-10-CM

## 2022-08-25 RX ORDER — PROMETHAZINE HYDROCHLORIDE 25 MG/1
25 TABLET ORAL EVERY 6 HOURS PRN
Qty: 21 TABLET | Refills: 2 | Status: SHIPPED | OUTPATIENT
Start: 2022-08-25 | End: 2022-11-08 | Stop reason: SDUPTHER

## 2022-09-09 ENCOUNTER — APPOINTMENT (OUTPATIENT)
Dept: CT IMAGING | Facility: HOSPITAL | Age: 47
End: 2022-09-09

## 2022-09-09 LAB
FLUAV SUBTYP SPEC NAA+PROBE: NOT DETECTED
FLUBV RNA ISLT QL NAA+PROBE: NOT DETECTED
SARS-COV-2 RNA PNL SPEC NAA+PROBE: NOT DETECTED

## 2022-09-09 PROCEDURE — 99283 EMERGENCY DEPT VISIT LOW MDM: CPT

## 2022-09-09 PROCEDURE — 87636 SARSCOV2 & INF A&B AMP PRB: CPT | Performed by: NURSE PRACTITIONER

## 2022-09-09 PROCEDURE — 70450 CT HEAD/BRAIN W/O DYE: CPT

## 2022-09-10 ENCOUNTER — HOSPITAL ENCOUNTER (EMERGENCY)
Facility: HOSPITAL | Age: 47
Discharge: HOME | End: 2022-09-10
Attending: EMERGENCY MEDICINE | Admitting: EMERGENCY MEDICINE

## 2022-09-10 VITALS
HEART RATE: 74 BPM | HEIGHT: 68 IN | BODY MASS INDEX: 18.64 KG/M2 | RESPIRATION RATE: 16 BRPM | WEIGHT: 123 LBS | DIASTOLIC BLOOD PRESSURE: 87 MMHG | SYSTOLIC BLOOD PRESSURE: 124 MMHG | TEMPERATURE: 98.1 F | OXYGEN SATURATION: 100 %

## 2022-09-10 DIAGNOSIS — G43.709 CHRONIC MIGRAINE WITHOUT AURA WITHOUT STATUS MIGRAINOSUS, NOT INTRACTABLE: Primary | ICD-10-CM

## 2022-09-10 PROCEDURE — 25010000002 DEXAMETHASONE SODIUM PHOSPHATE 10 MG/ML SOLUTION: Performed by: NURSE PRACTITIONER

## 2022-09-10 PROCEDURE — 63710000001 DIPHENHYDRAMINE PER 50 MG: Performed by: NURSE PRACTITIONER

## 2022-09-10 PROCEDURE — 25010000002 BUTORPHANOL PER 1 MG: Performed by: NURSE PRACTITIONER

## 2022-09-10 PROCEDURE — 96372 THER/PROPH/DIAG INJ SC/IM: CPT

## 2022-09-10 RX ORDER — DEXAMETHASONE SODIUM PHOSPHATE 10 MG/ML
10 INJECTION, SOLUTION INTRAMUSCULAR; INTRAVENOUS ONCE
Status: COMPLETED | OUTPATIENT
Start: 2022-09-10 | End: 2022-09-10

## 2022-09-10 RX ORDER — DIPHENHYDRAMINE HCL 25 MG
25 CAPSULE ORAL ONCE
Status: COMPLETED | OUTPATIENT
Start: 2022-09-10 | End: 2022-09-10

## 2022-09-10 RX ADMIN — DIPHENHYDRAMINE HYDROCHLORIDE 25 MG: 25 CAPSULE ORAL at 01:24

## 2022-09-10 RX ADMIN — BUTORPHANOL TARTRATE 2 MG: 2 INJECTION, SOLUTION INTRAMUSCULAR; INTRAVENOUS at 01:25

## 2022-09-10 RX ADMIN — DEXAMETHASONE SODIUM PHOSPHATE 10 MG: 10 INJECTION INTRAMUSCULAR; INTRAVENOUS at 01:54

## 2022-09-10 NOTE — ED PROVIDER NOTES
Subjective     Provider in Triage Note           MEDICAL SCREENING:    Reason for Visit: Headache    Patient initially seen in triage.  The patient was advised further evaluation and diagnostic testing will be needed, some of the treatment and testing will be initiated in the lobby in order to begin the process.  The patient will be returned to the waiting area for the time being and possibly be re-assessed by a subsequent ED provider.  The patient will be brought back to the treatment area in as timely manner as possible.        History provided by:  Patient  Headache  Pain location:  Generalized  Quality:  Sharp  Radiates to:  Does not radiate  Severity currently:  10/10  Onset quality:  Gradual  Timing:  Constant  Progression:  Unchanged  Chronicity:  Chronic  Similar to prior headaches: yes    Context: activity, bright light and loud noise    Relieved by:  Nothing  Ineffective treatments:  Prescription medications  Associated symptoms: no abdominal pain, no back pain, no blurred vision, no congestion, no cough, no diarrhea, no dizziness, no drainage, no ear pain, no eye pain, no facial pain, no fatigue, no fever, no focal weakness, no hearing loss, no loss of balance, no myalgias, no nausea, no near-syncope, no neck pain, no neck stiffness, no numbness, no paresthesias, no photophobia, no seizures, no sinus pressure, no URI, no visual change, no vomiting and no weakness        Review of Systems   Constitutional: Negative.  Negative for fatigue and fever.   HENT: Negative for congestion, ear pain, hearing loss, postnasal drip and sinus pressure.    Eyes: Negative for blurred vision, photophobia and pain.   Respiratory: Negative.  Negative for cough.    Cardiovascular: Negative.  Negative for chest pain and near-syncope.   Gastrointestinal: Negative.  Negative for abdominal pain, diarrhea, nausea and vomiting.   Endocrine: Negative.    Genitourinary: Negative.  Negative for dysuria.   Musculoskeletal: Negative for  back pain, myalgias, neck pain and neck stiffness.   Skin: Negative.    Allergic/Immunologic: Negative.    Neurological: Positive for headaches. Negative for dizziness, tremors, focal weakness, seizures, syncope, speech difficulty, weakness, numbness, paresthesias and loss of balance.   Hematological: Negative.    Psychiatric/Behavioral: Negative.    All other systems reviewed and are negative.      Past Medical History:   Diagnosis Date   • Abdominal pain    • Abdominal swelling    • Hoyos's disease    • Bipolar 1 disorder (HCC)    • Brain tumor (benign) (HCC)    • Brain tumor (HCC)     R Frontal Lobe per pt   • Brain tumor (HCC) 2014   • Constipation    • DDD (degenerative disc disease), cervical 05/29/2017   • DDD (degenerative disc disease), cervical    • Diarrhea    • Fibromyalgia    • IBS (irritable bowel syndrome)    • Migraine    • Nausea & vomiting    • PONV (postoperative nausea and vomiting)    • PTSD (post-traumatic stress disorder)    • Rectal bleeding        Allergies   Allergen Reactions   • Ativan [Lorazepam] Hallucinations     confusion   • Sulfa Antibiotics Shortness Of Breath and Swelling   • Sulfa Antibiotics Anaphylaxis   • Reglan [Metoclopramide] Angioedema   • Compazine [Prochlorperazine Edisylate] Hives   • Demerol [Meperidine] Hives   • Droperidol Itching   • Metoclopramide Swelling   • Toradol [Ketorolac Tromethamine] Hives and Itching   • Toradol [Ketorolac Tromethamine] Hives   • Zofran [Ondansetron Hcl] Rash   • Zosyn [Piperacillin Sod-Tazobactam So] Hives       Past Surgical History:   Procedure Laterality Date   • ANAL SCOPE N/A 7/28/2016    Procedure: ANAL SCOPE;  Surgeon: Kael Lopez MD;  Location: [facility] OR;  Service:    • APPENDECTOMY     • COLONOSCOPY N/A 6/30/2016    Procedure: COLONOSCOPY  CPTCODE:52549;  Surgeon: Jose Antonio Belle III, MD;  Location: [facility] OR;  Service:    • COLONOSCOPY N/A 7/7/2016    Procedure: COLONOSCOPY (81091) CPT;  Surgeon: Jose Antonio Belle  III, MD;  Location: [facility];  Service:    • CYSTOSCOPY RETROGRADE PYELOGRAM Bilateral 2017    Procedure: CYSTOSCOPY RETROGRADE PYELOGRAM;  Surgeon: Mu Blunt MD;  Location: [facility];  Service:    • ENDOSCOPY N/A 2016    Procedure: ESOPHAGOGASTRODUODENOSCOPY WITH BIOPSY  CPTCODE:93782;  Surgeon: Jose Antonio Belle III, MD;  Location: [facility] OR;  Service:    • HEMORRHOIDECTOMY N/A 2016    Procedure: HEMORRHOID STAPLING;  Surgeon: Kael Lopez MD;  Location: [facility];  Service:    • HYSTERECTOMY     • KNEE SURGERY     • LAPAROSCOPIC SALPINGOOPHERECTOMY     • PORTACATH PLACEMENT N/A 2017    Procedure: INSERTION OF PORTACATH;  Surgeon: Celso Arredondo MD;  Location: [facility];  Service:    • SHOULDER SURGERY      3 times       Family History   Problem Relation Age of Onset   • Crohn's disease Other    • Hypertension Other    • Diabetes Other    • Irritable bowel syndrome Other    • No Known Problems Father    • No Known Problems Mother        Social History     Socioeconomic History   • Marital status:    Tobacco Use   • Smoking status: Former Smoker     Packs/day: 1.00     Years: 20.00     Pack years: 20.00     Quit date: 2015     Years since quittin.8   • Smokeless tobacco: Never Used   Vaping Use   • Vaping Use: Never used   Substance and Sexual Activity   • Alcohol use: No   • Drug use: Yes     Types: Marijuana     Comment: for pain   • Sexual activity: Defer     Birth control/protection: Surgical           Objective   Physical Exam  Vitals and nursing note reviewed.   Constitutional:       General: She is not in acute distress.     Appearance: She is well-developed. She is not diaphoretic.   HENT:      Head: Normocephalic and atraumatic.      Right Ear: External ear normal.      Left Ear: External ear normal.      Nose: Nose normal.   Eyes:      Conjunctiva/sclera: Conjunctivae normal.      Pupils: Pupils are equal, round, and reactive to light.   Neck:       Vascular: No JVD.      Trachea: No tracheal deviation.   Cardiovascular:      Rate and Rhythm: Normal rate and regular rhythm.      Heart sounds: Normal heart sounds. No murmur heard.  Pulmonary:      Effort: Pulmonary effort is normal. No respiratory distress.      Breath sounds: Normal breath sounds. No wheezing.   Abdominal:      General: Bowel sounds are normal.      Palpations: Abdomen is soft.      Tenderness: There is no abdominal tenderness.   Musculoskeletal:         General: No deformity. Normal range of motion.      Cervical back: Normal range of motion and neck supple.   Skin:     General: Skin is warm and dry.      Coloration: Skin is not pale.      Findings: No erythema or rash.   Neurological:      Mental Status: She is alert and oriented to person, place, and time.      Cranial Nerves: No cranial nerve deficit.   Psychiatric:         Behavior: Behavior normal.         Thought Content: Thought content normal.         Procedures           ED Course  ED Course as of 09/10/22 0109   Sat Sep 10, 2022   0038 CT Head Without Contrast  FINDINGS:   Ventricular size and configuration are within normal limits. No acute infarct or hemorrhage is seen. Calcified right frontal meningioma is not significantly changed measuring about 1.5 x 1.3 cm.  No new masses. There is no skull fracture. Visualized  paranasal sinuses and mastoid air cells are clear.     IMPRESSION:  No acute findings and no significant interval change. [MB]      ED Course User Index  [MB] Tami Bianchi APRN                                           [facility]    Final diagnoses:   Chronic migraine without aura without status migrainosus, not intractable       ED Disposition  ED Disposition     ED Disposition   Discharge    Condition   Stable    Comment   --             Mabel Patterson APRN  [address] [id number] [phone]    Call in 2 days           Medication List      No changes were made to your prescriptions during  this visit.          Tami Bianchi, APRN  09/10/22 0108

## 2022-09-13 ENCOUNTER — OFFICE VISIT (OUTPATIENT)
Dept: UROLOGY | Facility: CLINIC | Age: 47
End: 2022-09-13

## 2022-09-13 VITALS — WEIGHT: 123 LBS | HEIGHT: 68 IN | BODY MASS INDEX: 18.64 KG/M2

## 2022-09-13 DIAGNOSIS — Z48.816 AFTERCARE FOLLOWING SURGERY OF THE GENITOURINARY SYSTEM: Primary | ICD-10-CM

## 2022-09-13 DIAGNOSIS — N35.028 OTHER POST-TRAUMATIC URETHRAL STRICTURE, FEMALE: ICD-10-CM

## 2022-09-13 DIAGNOSIS — F31.9 BIPOLAR 1 DISORDER: ICD-10-CM

## 2022-09-13 DIAGNOSIS — N23 RENAL COLIC: ICD-10-CM

## 2022-09-13 PROCEDURE — 96372 THER/PROPH/DIAG INJ SC/IM: CPT | Performed by: UROLOGY

## 2022-09-13 PROCEDURE — 53661 DILATION OF URETHRA: CPT | Performed by: UROLOGY

## 2022-09-13 RX ORDER — GENTAMICIN SULFATE 40 MG/ML
80 INJECTION, SOLUTION INTRAMUSCULAR; INTRAVENOUS ONCE
Status: COMPLETED | OUTPATIENT
Start: 2022-09-13 | End: 2022-09-13

## 2022-09-13 RX ORDER — MEGESTROL ACETATE 20 MG/1
20 TABLET ORAL DAILY
Qty: 30 TABLET | Refills: 3 | Status: SHIPPED | OUTPATIENT
Start: 2022-09-13 | End: 2022-11-08 | Stop reason: SDUPTHER

## 2022-09-13 RX ORDER — OXYCODONE AND ACETAMINOPHEN 10; 325 MG/1; MG/1
1 TABLET ORAL EVERY 6 HOURS PRN
Qty: 20 TABLET | Refills: 0 | Status: SHIPPED | OUTPATIENT
Start: 2022-09-13 | End: 2022-10-11 | Stop reason: SDUPTHER

## 2022-09-13 RX ADMIN — GENTAMICIN SULFATE 80 MG: 40 INJECTION, SOLUTION INTRAMUSCULAR; INTRAVENOUS at 11:10

## 2022-09-13 NOTE — PROGRESS NOTES
Chief Complaint:      Chief Complaint   Patient presents with   • Cystitis       HPI:   47 y.o. female.  Here for monthly dilation secondary to severe urethral stricture.    Past Medical History:     Past Medical History:   Diagnosis Date   • Abdominal pain    • Abdominal swelling    • Hoyos's disease    • Bipolar 1 disorder (HCC)    • Brain tumor (benign) (HCC)    • Brain tumor (HCC)     R Frontal Lobe per pt   • Brain tumor (HCC) 2014   • Constipation    • DDD (degenerative disc disease), cervical 05/29/2017   • DDD (degenerative disc disease), cervical    • Diarrhea    • Fibromyalgia    • IBS (irritable bowel syndrome)    • Migraine    • Nausea & vomiting    • PONV (postoperative nausea and vomiting)    • PTSD (post-traumatic stress disorder)    • Rectal bleeding        Current Meds:     Current Outpatient Medications   Medication Sig Dispense Refill   • albuterol (PROVENTIL HFA;VENTOLIN HFA) 108 (90 Base) MCG/ACT inhaler Inhale 2 puffs 2 (Two) Times a Day.     • Azelastine HCl 137 MCG/SPRAY solution 1 spray into each nostril As Needed (Allergies).     • busPIRone (BUSPAR) 15 MG tablet Take 15 mg by mouth 3 (three) times a day        • ciprofloxacin (CIPRO) 500 MG tablet Take 1 tablet by mouth 2 (Two) Times a Day. 19 tablet 0   • diclofenac (VOLTAREN) 1 % gel gel      • divalproex (DEPAKOTE) 500 MG DR tablet Take 1 tablet by mouth 3 (Three) Times a Day. 90 tablet 2   • docusate sodium (COLACE) 100 MG capsule Take 1 capsule by mouth 2 (Two) Times a Day. 20 capsule 0   • docusate sodium (COLACE) 100 MG capsule Take 1 capsule by mouth 2 (Two) Times a Day As Needed for Constipation. 60 capsule 0   • fluticasone (VERAMYST) 27.5 MCG/SPRAY nasal spray 2 sprays into each nostril Daily.     • megestrol (MEGACE) 20 MG tablet Take 1 tablet by mouth Daily. 30 tablet 3   • metroNIDAZOLE (FLAGYL) 500 MG tablet Take 1 tablet by mouth 3 (Three) Times a Day. 30 tablet 0   • montelukast (SINGULAIR) 10 MG tablet Take 10 mg by  mouth Every Night.     • oxyCODONE-acetaminophen (Percocet)  MG per tablet Take 1 tablet by mouth Every 6 (Six) Hours As Needed for Moderate Pain . 20 tablet 0   • oxyCODONE-acetaminophen (PERCOCET) 5-325 MG per tablet Take 1 tablet by mouth Every 8 (Eight) Hours As Needed for Moderate Pain . 12 tablet 0   • pantoprazole (PROTONIX) 40 MG EC tablet Take 40 mg by mouth 2 (Two) Times a Day As Needed.     • phenazopyridine (PYRIDIUM) 100 MG tablet      • polyethylene glycol (MIRALAX) 17 GM/SCOOP powder Take 17 g by mouth Daily. 225 g 0   • predniSONE (DELTASONE) 50 MG tablet Take 1 tablet by mouth Daily. 5 tablet 0   • promethazine (PHENERGAN) 25 MG tablet Take 1 tablet by mouth Every 6 (Six) Hours As Needed for Nausea or Vomiting. 21 tablet 2   • sertraline (ZOLOFT) 100 MG tablet Take 100 mg by mouth 2 (Two) Times a Day.     • SUMAtriptan (IMITREX) 6 MG/0.5ML injection Inject 6 mg under the skin 1 (One) Time.     • terazosin (HYTRIN) 2 MG capsule Take 1 capsule by mouth Every Night for 14 days. 14 capsule 0   • traMADol (ULTRAM) 50 MG tablet        No current facility-administered medications for this visit.        Allergies:      Allergies   Allergen Reactions   • Ativan [Lorazepam] Hallucinations     confusion   • Sulfa Antibiotics Shortness Of Breath and Swelling   • Sulfa Antibiotics Anaphylaxis   • Reglan [Metoclopramide] Angioedema   • Compazine [Prochlorperazine Edisylate] Hives   • Demerol [Meperidine] Hives   • Droperidol Itching   • Metoclopramide Swelling   • Toradol [Ketorolac Tromethamine] Hives and Itching   • Toradol [Ketorolac Tromethamine] Hives   • Zofran [Ondansetron Hcl] Rash   • Zosyn [Piperacillin Sod-Tazobactam So] Hives        Past Surgical History:     Past Surgical History:   Procedure Laterality Date   • ANAL SCOPE N/A 7/28/2016    Procedure: ANAL SCOPE;  Surgeon: Kael Lopez MD;  Location: [facility];  Service:    • APPENDECTOMY     • COLONOSCOPY N/A 6/30/2016    Procedure:  COLONOSCOPY  CPTCODE:95368;  Surgeon: Jose Antonio Belle III, MD;  Location: [facility] OR;  Service:    • COLONOSCOPY N/A 2016    Procedure: COLONOSCOPY (97538) CPT;  Surgeon: Jose Antonio Belle III, MD;  Location: [facility] OR;  Service:    • CYSTOSCOPY RETROGRADE PYELOGRAM Bilateral 2017    Procedure: CYSTOSCOPY RETROGRADE PYELOGRAM;  Surgeon: Mu Blunt MD;  Location: [facility] OR;  Service:    • ENDOSCOPY N/A 2016    Procedure: ESOPHAGOGASTRODUODENOSCOPY WITH BIOPSY  CPTCODE:44822;  Surgeon: Jose Antonio Belle III, MD;  Location: [facility] OR;  Service:    • HEMORRHOIDECTOMY N/A 2016    Procedure: HEMORRHOID STAPLING;  Surgeon: Kael Lopez MD;  Location: [facility] OR;  Service:    • HYSTERECTOMY     • KNEE SURGERY     • LAPAROSCOPIC SALPINGOOPHERECTOMY     • PORTACATH PLACEMENT N/A 2017    Procedure: INSERTION OF PORTACATH;  Surgeon: Celso Arredondo MD;  Location: [facility] OR;  Service:    • SHOULDER SURGERY      3 times       Social History:     Social History     Socioeconomic History   • Marital status:    Tobacco Use   • Smoking status: Former Smoker     Packs/day: 1.00     Years: 20.00     Pack years: 20.00     Quit date: 2015     Years since quittin.8   • Smokeless tobacco: Never Used   Vaping Use   • Vaping Use: Never used   Substance and Sexual Activity   • Alcohol use: No   • Drug use: Yes     Types: Marijuana     Comment: for pain   • Sexual activity: Defer     Birth control/protection: Surgical       Family History:     Family History   Problem Relation Age of Onset   • Crohn's disease Other    • Hypertension Other    • Diabetes Other    • Irritable bowel syndrome Other    • No Known Problems Father    • No Known Problems Mother        Review of Systems:     Review of Systems   Constitutional: Negative.  Negative for activity change, appetite change, chills, diaphoresis, fatigue and unexpected weight change.   HENT: Negative for congestion, dental  problem, drooling, ear discharge, ear pain, facial swelling, hearing loss, mouth sores, nosebleeds, postnasal drip, rhinorrhea, sinus pressure, sneezing, sore throat, tinnitus, trouble swallowing and voice change.    Eyes: Negative.  Negative for photophobia, pain, discharge, redness, itching and visual disturbance.   Respiratory: Negative.  Negative for apnea, cough, choking, chest tightness, shortness of breath, wheezing and stridor.    Cardiovascular: Negative.  Negative for chest pain, palpitations and leg swelling.   Gastrointestinal: Negative.  Negative for abdominal distention, abdominal pain, anal bleeding, blood in stool, constipation, diarrhea, nausea, rectal pain and vomiting.   Endocrine: Negative.  Negative for cold intolerance, heat intolerance, polydipsia, polyphagia and polyuria.   Genitourinary: Positive for difficulty urinating, flank pain, frequency and hematuria.   Musculoskeletal: Negative for arthralgias, back pain, gait problem, joint swelling, myalgias, neck pain and neck stiffness.   Skin: Negative.  Negative for color change, pallor, rash and wound.   Allergic/Immunologic: Negative.  Negative for environmental allergies, food allergies and immunocompromised state.   Neurological: Negative.  Negative for dizziness, tremors, seizures, syncope, facial asymmetry, speech difficulty, weakness, light-headedness, numbness and headaches.   Hematological: Negative.  Negative for adenopathy. Does not bruise/bleed easily.   Psychiatric/Behavioral: Negative for agitation, behavioral problems, confusion, decreased concentration, dysphoric mood, hallucinations, self-injury, sleep disturbance and suicidal ideas. The patient is not nervous/anxious and is not hyperactive.    All other systems reviewed and are negative.      Physical Exam:     Physical Exam  Constitutional:       Appearance: She is well-developed.   HENT:      Head: Normocephalic and atraumatic.      Right Ear: External ear normal.      Left  Ear: External ear normal.   Eyes:      Conjunctiva/sclera: Conjunctivae normal.      Pupils: Pupils are equal, round, and reactive to light.   Cardiovascular:      Rate and Rhythm: Normal rate and regular rhythm.      Heart sounds: Normal heart sounds.   Pulmonary:      Effort: Pulmonary effort is normal.      Breath sounds: Normal breath sounds.   Abdominal:      General: Bowel sounds are normal. There is no distension.      Palpations: Abdomen is soft. There is no mass.      Tenderness: There is no abdominal tenderness. There is no guarding or rebound.   Genitourinary:     General: Normal vulva.      Vagina: No vaginal discharge.   Musculoskeletal:         General: Normal range of motion.   Skin:     General: Skin is warm and dry.   Neurological:      Mental Status: She is alert.      Deep Tendon Reflexes: Reflexes are normal and symmetric.   Psychiatric:         Behavior: Behavior normal.         Thought Content: Thought content normal.         Judgment: Judgment normal.         I have reviewed the following portions of the patient's history: Allergies, current medications, past family history, past medical history, past social history, past surgical history, problem list, and ROS and confirm it is accurate.    Recent Image (CT and/or KUB):      CT Abdomen and Pelvis: No results found for this or any previous visit.       CT Stone Protocol: Results for orders placed during the hospital encounter of 03/29/22    CT Abdomen Pelvis Stone Protocol    Narrative  CT Abdomen Pelvis WO    INDICATION:  Right flank pain today    TECHNIQUE:  CT of the abdomen and pelvis without IV contrast. Coronal and sagittal reconstructions were obtained.  Radiation dose reduction techniques included automated exposure control or exposure modulation based on body size. Count of known CT and cardiac nuc  med studies performed in previous 12 months: 2.    COMPARISON:  3/7/2022    FINDINGS:  Abdomen: Lung bases are clear the liver,  gallbladder, spleen, pancreas, adrenal glands and kidneys are normal. There are no urinary stones or hydronephrosis identified. The aorta is normal in size. There is no adenopathy. Bowel is unremarkable. The  appendix is visible on image 75. There is no evidence of appendicitis..    Pelvis: Bladder is normal. Uterus is been removed. There are no adnexal masses. Bones are unremarkable.    Impression  No urinary stones are identified. There is no evidence of appendicitis but study is limited by lack of IV contrast oral contrast and fat. I do believe I can see the appendix on image 75          Signer Name: Luis Hale MD  Signed: 3/29/2022 6:41 PM  Workstation Name: [facility]  [facility]       KUB: Results for orders placed during the hospital encounter of 03/12/17    XR Abdomen KUB    Narrative  EXAMINATION: XR ABDOMEN KUB-    CLINICAL INDICATION: flank pain      COMPARISON: None available    FINDINGS: 2 views of the abdomen show a moderate to large amount of  stool in the colon.    No evidence of bowel obstruction.    Calcifications in the pelvis probably phleboliths.    This report was finalized on 3/12/2017 8:11 PM by Dr. Luis Fernando Macdonald MD.       Labs (past 3 months):      Admission on 09/10/2022, Discharged on 09/10/2022   Component Date Value Ref Range Status   • COVID19 09/09/2022 Not Detected  Not Detected - Ref. Range Final   • Influenza A PCR 09/09/2022 Not Detected  Not Detected Final   • Influenza B PCR 09/09/2022 Not Detected  Not Detected Final   Admission on 08/23/2022, Discharged on 08/24/2022   Component Date Value Ref Range Status   • Glucose 08/23/2022 89  65 - 99 mg/dL Final   • BUN 08/23/2022 11  6 - 20 mg/dL Final   • Creatinine 08/23/2022 0.60  0.57 - 1.00 mg/dL Final   • Sodium 08/23/2022 138  136 - 145 mmol/L Final   • Potassium 08/23/2022 3.6  3.5 - 5.2 mmol/L Final    Slight hemolysis detected by analyzer. Results may be affected.   • Chloride 08/23/2022 104  98 - 107  mmol/L Final   • CO2 08/23/2022 24.8  22.0 - 29.0 mmol/L Final   • Calcium 08/23/2022 9.4  8.6 - 10.5 mg/dL Final   • Total Protein 08/23/2022 6.8  6.0 - 8.5 g/dL Final   • Albumin 08/23/2022 4.53  3.50 - 5.20 g/dL Final   • ALT (SGPT) 08/23/2022 26  1 - 33 U/L Final   • AST (SGOT) 08/23/2022 27  1 - 32 U/L Final   • Alkaline Phosphatase 08/23/2022 76  39 - 117 U/L Final   • Total Bilirubin 08/23/2022 0.5  0.0 - 1.2 mg/dL Final   • Globulin 08/23/2022 2.3  gm/dL Final   • A/G Ratio 08/23/2022 2.0  g/dL Final   • BUN/Creatinine Ratio 08/23/2022 18.3  7.0 - 25.0 Final   • Anion Gap 08/23/2022 9.2  5.0 - 15.0 mmol/L Final   • eGFR 08/23/2022 111.6  >60.0 mL/min/1.73 Final    National Kidney Foundation and American Society of Nephrology (ASN) Task Force recommended calculation based on the Chronic Kidney Disease Epidemiology Collaboration (CKD-EPI) equation refit without adjustment for race.   • WBC 08/23/2022 6.41  3.40 - 10.80 10*3/mm3 Final   • RBC 08/23/2022 4.58  3.77 - 5.28 10*6/mm3 Final   • Hemoglobin 08/23/2022 14.2  12.0 - 15.9 g/dL Final   • Hematocrit 08/23/2022 41.7  34.0 - 46.6 % Final   • MCV 08/23/2022 91.0  79.0 - 97.0 fL Final   • MCH 08/23/2022 31.0  26.6 - 33.0 pg Final   • MCHC 08/23/2022 34.1  31.5 - 35.7 g/dL Final   • RDW 08/23/2022 13.4  12.3 - 15.4 % Final   • RDW-SD 08/23/2022 44.8  37.0 - 54.0 fl Final   • MPV 08/23/2022 10.1  6.0 - 12.0 fL Final   • Platelets 08/23/2022 176  140 - 450 10*3/mm3 Final   • Neutrophil % 08/23/2022 47.0  42.7 - 76.0 % Final   • Lymphocyte % 08/23/2022 43.7  19.6 - 45.3 % Final   • Monocyte % 08/23/2022 6.9  5.0 - 12.0 % Final   • Eosinophil % 08/23/2022 1.9  0.3 - 6.2 % Final   • Basophil % 08/23/2022 0.3  0.0 - 1.5 % Final   • Immature Grans % 08/23/2022 0.2  0.0 - 0.5 % Final   • Neutrophils, Absolute 08/23/2022 3.02  1.70 - 7.00 10*3/mm3 Final   • Lymphocytes, Absolute 08/23/2022 2.80  0.70 - 3.10 10*3/mm3 Final   • Monocytes, Absolute 08/23/2022 0.44  0.10 -  0.90 10*3/mm3 Final   • Eosinophils, Absolute 08/23/2022 0.12  0.00 - 0.40 10*3/mm3 Final   • Basophils, Absolute 08/23/2022 0.02  0.00 - 0.20 10*3/mm3 Final   • Immature Grans, Absolute 08/23/2022 0.01  0.00 - 0.05 10*3/mm3 Final   • nRBC 08/23/2022 0.0  0.0 - 0.2 /100 WBC Final   • Extra Tube 08/23/2022 Hold for add-ons.   Final    Auto resulted.   • Extra Tube 08/23/2022 hold for add-on   Final    Auto resulted   • Extra Tube 08/23/2022 Hold for add-ons.   Final    Auto resulted.   • Extra Tube 08/23/2022 Hold for add-ons.   Final    Auto resulted   Admission on 08/12/2022, Discharged on 08/12/2022   Component Date Value Ref Range Status   • Color, UA 08/12/2022 Orange (A) Yellow, Straw Final   • Appearance, UA 08/12/2022 Cloudy (A) Clear Final   • pH, UA 08/12/2022 6.0  5.0 - 8.0 Final   • Specific Gravity, UA 08/12/2022 1.011  1.005 - 1.030 Final   • Glucose, UA 08/12/2022 Negative  Negative Final   • Ketones, UA 08/12/2022 Negative  Negative Final   • Bilirubin, UA 08/12/2022 Negative  Negative Final   • Blood, UA 08/12/2022 Large (3+) (A) Negative Final   • Protein, UA 08/12/2022 Trace (A) Negative Final   • Leuk Esterase, UA 08/12/2022 Trace (A) Negative Final   • Nitrite, UA 08/12/2022 Negative  Negative Final   • Urobilinogen, UA 08/12/2022 1.0 E.U./dL  0.2 - 1.0 E.U./dL Final   • Extra Tube 08/12/2022 Hold for add-ons.   Final    Auto resulted.   • Extra Tube 08/12/2022 hold for add-on   Final    Auto resulted   • Extra Tube 08/12/2022 Hold for add-ons.   Final    Auto resulted.   • Extra Tube 08/12/2022 Hold for add-ons.   Final    Auto resulted   • RBC, UA 08/12/2022 Too Numerous to Count (A) None Seen, 0-2 /HPF Final   • WBC, UA 08/12/2022 3-5 (A) None Seen, 0-2 /HPF Final   • Bacteria, UA 08/12/2022 None Seen  None Seen /HPF Final   • Squamous Epithelial Cells, UA 08/12/2022 0-2  None Seen, 0-2 /HPF Final   • Hyaline Casts, UA 08/12/2022 None Seen  None Seen /LPF Final   • Methodology 08/12/2022  Automated Microscopy   Final   • Glucose 08/12/2022 98  65 - 99 mg/dL Final   • BUN 08/12/2022 7  6 - 20 mg/dL Final   • Creatinine 08/12/2022 0.53 (A) 0.57 - 1.00 mg/dL Final   • Sodium 08/12/2022 136  136 - 145 mmol/L Final   • Potassium 08/12/2022 3.6  3.5 - 5.2 mmol/L Final    Slight hemolysis detected by analyzer. Results may be affected.   • Chloride 08/12/2022 102  98 - 107 mmol/L Final   • CO2 08/12/2022 23.4  22.0 - 29.0 mmol/L Final   • Calcium 08/12/2022 9.3  8.6 - 10.5 mg/dL Final   • Total Protein 08/12/2022 6.7  6.0 - 8.5 g/dL Final   • Albumin 08/12/2022 4.44  3.50 - 5.20 g/dL Final   • ALT (SGPT) 08/12/2022 33  1 - 33 U/L Final   • AST (SGOT) 08/12/2022 34 (A) 1 - 32 U/L Final   • Alkaline Phosphatase 08/12/2022 71  39 - 117 U/L Final   • Total Bilirubin 08/12/2022 0.8  0.0 - 1.2 mg/dL Final   • Globulin 08/12/2022 2.3  gm/dL Final   • A/G Ratio 08/12/2022 2.0  g/dL Final   • BUN/Creatinine Ratio 08/12/2022 13.2  7.0 - 25.0 Final   • Anion Gap 08/12/2022 10.6  5.0 - 15.0 mmol/L Final   • eGFR 08/12/2022 115.0  >60.0 mL/min/1.73 Final    National Kidney Foundation and American Society of Nephrology (ASN) Task Force recommended calculation based on the Chronic Kidney Disease Epidemiology Collaboration (CKD-EPI) equation refit without adjustment for race.   • C-Reactive Protein 08/12/2022 <0.30  0.00 - 0.50 mg/dL Final   • WBC 08/12/2022 6.37  3.40 - 10.80 10*3/mm3 Final   • RBC 08/12/2022 4.70  3.77 - 5.28 10*6/mm3 Final   • Hemoglobin 08/12/2022 14.3  12.0 - 15.9 g/dL Final   • Hematocrit 08/12/2022 43.0  34.0 - 46.6 % Final   • MCV 08/12/2022 91.5  79.0 - 97.0 fL Final   • MCH 08/12/2022 30.4  26.6 - 33.0 pg Final   • MCHC 08/12/2022 33.3  31.5 - 35.7 g/dL Final   • RDW 08/12/2022 13.1  12.3 - 15.4 % Final   • RDW-SD 08/12/2022 43.9  37.0 - 54.0 fl Final   • MPV 08/12/2022 10.0  6.0 - 12.0 fL Final   • Platelets 08/12/2022 160  140 - 450 10*3/mm3 Final   • Neutrophil % 08/12/2022 69.5  42.7 - 76.0 %  Final   • Lymphocyte % 08/12/2022 23.9  19.6 - 45.3 % Final   • Monocyte % 08/12/2022 5.2  5.0 - 12.0 % Final   • Eosinophil % 08/12/2022 1.1  0.3 - 6.2 % Final   • Basophil % 08/12/2022 0.0  0.0 - 1.5 % Final   • Immature Grans % 08/12/2022 0.3  0.0 - 0.5 % Final   • Neutrophils, Absolute 08/12/2022 4.43  1.70 - 7.00 10*3/mm3 Final   • Lymphocytes, Absolute 08/12/2022 1.52  0.70 - 3.10 10*3/mm3 Final   • Monocytes, Absolute 08/12/2022 0.33  0.10 - 0.90 10*3/mm3 Final   • Eosinophils, Absolute 08/12/2022 0.07  0.00 - 0.40 10*3/mm3 Final   • Basophils, Absolute 08/12/2022 0.00  0.00 - 0.20 10*3/mm3 Final   • Immature Grans, Absolute 08/12/2022 0.02  0.00 - 0.05 10*3/mm3 Final   • nRBC 08/12/2022 0.0  0.0 - 0.2 /100 WBC Final   Admission on 07/30/2022, Discharged on 07/30/2022   Component Date Value Ref Range Status   • Glucose 07/30/2022 101 (A) 65 - 99 mg/dL Final   • BUN 07/30/2022 6  6 - 20 mg/dL Final   • Creatinine 07/30/2022 0.56 (A) 0.57 - 1.00 mg/dL Final   • Sodium 07/30/2022 143  136 - 145 mmol/L Final   • Potassium 07/30/2022 4.4  3.5 - 5.2 mmol/L Final   • Chloride 07/30/2022 105  98 - 107 mmol/L Final   • CO2 07/30/2022 28.8  22.0 - 29.0 mmol/L Final   • Calcium 07/30/2022 9.7  8.6 - 10.5 mg/dL Final   • Total Protein 07/30/2022 6.7  6.0 - 8.5 g/dL Final   • Albumin 07/30/2022 4.47  3.50 - 5.20 g/dL Final   • ALT (SGPT) 07/30/2022 39 (A) 1 - 33 U/L Final   • AST (SGOT) 07/30/2022 37 (A) 1 - 32 U/L Final   • Alkaline Phosphatase 07/30/2022 74  39 - 117 U/L Final   • Total Bilirubin 07/30/2022 0.5  0.0 - 1.2 mg/dL Final   • Globulin 07/30/2022 2.2  gm/dL Final   • A/G Ratio 07/30/2022 2.0  g/dL Final   • BUN/Creatinine Ratio 07/30/2022 10.7  7.0 - 25.0 Final   • Anion Gap 07/30/2022 9.2  5.0 - 15.0 mmol/L Final   • eGFR 07/30/2022 113.4  >60.0 mL/min/1.73 Final    National Kidney Foundation and American Society of Nephrology (ASN) Task Force recommended calculation based on the Chronic Kidney Disease  Epidemiology Collaboration (CKD-EPI) equation refit without adjustment for race.   • Troponin T 07/30/2022 <0.010  0.000 - 0.030 ng/mL Final   • Troponin T 07/30/2022 <0.010  0.000 - 0.030 ng/mL Final   • TSH 07/30/2022 0.711  0.270 - 4.200 uIU/mL Final   • Magnesium 07/30/2022 1.9  1.6 - 2.6 mg/dL Final   • Extra Tube 07/30/2022 Hold for add-ons.   Final    Auto resulted.   • Extra Tube 07/30/2022 hold for add-on   Final    Auto resulted   • Extra Tube 07/30/2022 Hold for add-ons.   Final    Auto resulted.   • Extra Tube 07/30/2022 Hold for add-ons.   Final    Auto resulted   • WBC 07/30/2022 4.80  3.40 - 10.80 10*3/mm3 Final   • RBC 07/30/2022 4.85  3.77 - 5.28 10*6/mm3 Final   • Hemoglobin 07/30/2022 14.8  12.0 - 15.9 g/dL Final   • Hematocrit 07/30/2022 45.0  34.0 - 46.6 % Final   • MCV 07/30/2022 92.8  79.0 - 97.0 fL Final   • MCH 07/30/2022 30.5  26.6 - 33.0 pg Final   • MCHC 07/30/2022 32.9  31.5 - 35.7 g/dL Final   • RDW 07/30/2022 13.1  12.3 - 15.4 % Final   • RDW-SD 07/30/2022 44.7  37.0 - 54.0 fl Final   • MPV 07/30/2022 10.7  6.0 - 12.0 fL Final   • Platelets 07/30/2022 137 (A) 140 - 450 10*3/mm3 Final   • Neutrophil % 07/30/2022 60.0  42.7 - 76.0 % Final   • Lymphocyte % 07/30/2022 31.7  19.6 - 45.3 % Final   • Monocyte % 07/30/2022 5.8  5.0 - 12.0 % Final   • Eosinophil % 07/30/2022 2.1  0.3 - 6.2 % Final   • Basophil % 07/30/2022 0.2  0.0 - 1.5 % Final   • Immature Grans % 07/30/2022 0.2  0.0 - 0.5 % Final   • Neutrophils, Absolute 07/30/2022 2.88  1.70 - 7.00 10*3/mm3 Final   • Lymphocytes, Absolute 07/30/2022 1.52  0.70 - 3.10 10*3/mm3 Final   • Monocytes, Absolute 07/30/2022 0.28  0.10 - 0.90 10*3/mm3 Final   • Eosinophils, Absolute 07/30/2022 0.10  0.00 - 0.40 10*3/mm3 Final   • Basophils, Absolute 07/30/2022 0.01  0.00 - 0.20 10*3/mm3 Final   • Immature Grans, Absolute 07/30/2022 0.01  0.00 - 0.05 10*3/mm3 Final   • nRBC 07/30/2022 0.0  0.0 - 0.2 /100 WBC Final   • D-Dimer, Quantitative  07/30/2022 3.57 (A) 0.00 - 0.50 MCGFEU/mL Final   • THC, Screen, Urine 07/30/2022 Positive (A) Negative Final   • Phencyclidine (PCP), Urine 07/30/2022 Negative  Negative Final   • Cocaine Screen, Urine 07/30/2022 Negative  Negative Final   • Methamphetamine, Ur 07/30/2022 Negative  Negative Final   • Opiate Screen 07/30/2022 Negative  Negative Final   • Amphetamine Screen, Urine 07/30/2022 Negative  Negative Final   • Benzodiazepine Screen, Urine 07/30/2022 Negative  Negative Final   • Tricyclic Antidepressants Screen 07/30/2022 Negative  Negative Final   • Methadone Screen, Urine 07/30/2022 Negative  Negative Final   • Barbiturates Screen, Urine 07/30/2022 Negative  Negative Final   • Oxycodone Screen, Urine 07/30/2022 Positive (A) Negative Final   • Propoxyphene Screen 07/30/2022 Negative  Negative Final   • Buprenorphine, Screen, Urine 07/30/2022 Negative  Negative Final   • QT Interval 07/30/2022 400  ms Corrected   • QTC Interval 07/30/2022 428  ms Corrected   Admission on 07/22/2022, Discharged on 07/22/2022   Component Date Value Ref Range Status   • QT Interval 07/22/2022 378  ms Final   • QTC Interval 07/22/2022 452  ms Final   • Glucose 07/22/2022 109 (A) 65 - 99 mg/dL Final   • BUN 07/22/2022 4 (A) 6 - 20 mg/dL Final   • Creatinine 07/22/2022 0.59  0.57 - 1.00 mg/dL Final   • Sodium 07/22/2022 141  136 - 145 mmol/L Final   • Potassium 07/22/2022 3.3 (A) 3.5 - 5.2 mmol/L Final   • Chloride 07/22/2022 105  98 - 107 mmol/L Final   • CO2 07/22/2022 21.9 (A) 22.0 - 29.0 mmol/L Final   • Calcium 07/22/2022 9.2  8.6 - 10.5 mg/dL Final   • Total Protein 07/22/2022 6.2  6.0 - 8.5 g/dL Final   • Albumin 07/22/2022 4.09  3.50 - 5.20 g/dL Final   • ALT (SGPT) 07/22/2022 32  1 - 33 U/L Final   • AST (SGOT) 07/22/2022 30  1 - 32 U/L Final   • Alkaline Phosphatase 07/22/2022 63  39 - 117 U/L Final   • Total Bilirubin 07/22/2022 0.5  0.0 - 1.2 mg/dL Final   • Globulin 07/22/2022 2.1  gm/dL Final   • A/G Ratio 07/22/2022  1.9  g/dL Final   • BUN/Creatinine Ratio 07/22/2022 6.8 (A) 7.0 - 25.0 Final   • Anion Gap 07/22/2022 14.1  5.0 - 15.0 mmol/L Final   • eGFR 07/22/2022 112.0  >60.0 mL/min/1.73 Final    [employer] and American Society of Nephrology (ASN) Task Force recommended calculation based on the Chronic Kidney Disease Epidemiology Collaboration (CKD-EPI) equation refit without adjustment for race.   • proBNP 07/22/2022 143.5  0.0 - 450.0 pg/mL Final   • Troponin T 07/22/2022 <0.010  0.000 - 0.030 ng/mL Final   • D-Dimer, Quantitative 07/22/2022 3.50 (A) 0.00 - 0.50 MCGFEU/mL Final   • C-Reactive Protein 07/22/2022 <0.30  0.00 - 0.50 mg/dL Final   • Lactate 07/22/2022 1.6  0.5 - 2.0 mmol/L Final   • Procalcitonin 07/22/2022 0.04  0.00 - 0.25 ng/mL Final   • THC, Screen, Urine 07/22/2022 Positive (A) Negative Final   • Phencyclidine (PCP), Urine 07/22/2022 Negative  Negative Final   • Cocaine Screen, Urine 07/22/2022 Negative  Negative Final   • Methamphetamine, Ur 07/22/2022 Positive (A) Negative Final   • Opiate Screen 07/22/2022 Negative  Negative Final   • Amphetamine Screen, Urine 07/22/2022 Positive (A) Negative Final   • Benzodiazepine Screen, Urine 07/22/2022 Negative  Negative Final   • Tricyclic Antidepressants Screen 07/22/2022 Negative  Negative Final   • Methadone Screen, Urine 07/22/2022 Negative  Negative Final   • Barbiturates Screen, Urine 07/22/2022 Negative  Negative Final   • Oxycodone Screen, Urine 07/22/2022 Negative  Negative Final   • Propoxyphene Screen 07/22/2022 Negative  Negative Final   • Buprenorphine, Screen, Urine 07/22/2022 Negative  Negative Final   • COVID19 07/22/2022 Not Detected  Not Detected - Ref. Range Final   • Influenza A PCR 07/22/2022 Not Detected  Not Detected Final   • Influenza B PCR 07/22/2022 Not Detected  Not Detected Final   • Color,  07/22/2022 Yellow  Yellow, Straw Final   • Appearance,  07/22/2022 Clear  Clear Final   • pH,  07/22/2022 6.5  5.0 - 8.0  Final   • Specific Gravity, UA 07/22/2022 1.013  1.005 - 1.030 Final   • Glucose, UA 07/22/2022 Negative  Negative Final   • Ketones, UA 07/22/2022 Negative  Negative Final   • Bilirubin, UA 07/22/2022 Negative  Negative Final   • Blood, UA 07/22/2022 Negative  Negative Final   • Protein, UA 07/22/2022 Negative  Negative Final   • Leuk Esterase, UA 07/22/2022 Negative  Negative Final   • Nitrite, UA 07/22/2022 Negative  Negative Final   • Urobilinogen, UA 07/22/2022 1.0 E.U./dL  0.2 - 1.0 E.U./dL Final   • Extra Tube 07/22/2022 Hold for add-ons.   Final    Auto resulted.   • Extra Tube 07/22/2022 hold for add-on   Final    Auto resulted   • Extra Tube 07/22/2022 Hold for add-ons.   Final    Auto resulted.   • Extra Tube 07/22/2022 Hold for add-ons.   Final    Auto resulted   • WBC 07/22/2022 4.55  3.40 - 10.80 10*3/mm3 Final   • RBC 07/22/2022 4.73  3.77 - 5.28 10*6/mm3 Final   • Hemoglobin 07/22/2022 14.8  12.0 - 15.9 g/dL Final   • Hematocrit 07/22/2022 44.4  34.0 - 46.6 % Final   • MCV 07/22/2022 93.9  79.0 - 97.0 fL Final   • MCH 07/22/2022 31.3  26.6 - 33.0 pg Final   • MCHC 07/22/2022 33.3  31.5 - 35.7 g/dL Final   • RDW 07/22/2022 12.8  12.3 - 15.4 % Final   • RDW-SD 07/22/2022 44.2  37.0 - 54.0 fl Final   • MPV 07/22/2022 10.3  6.0 - 12.0 fL Final   • Platelets 07/22/2022 141  140 - 450 10*3/mm3 Final   • Neutrophil % 07/22/2022 52.9  42.7 - 76.0 % Final   • Lymphocyte % 07/22/2022 36.7  19.6 - 45.3 % Final   • Monocyte % 07/22/2022 7.3  5.0 - 12.0 % Final   • Eosinophil % 07/22/2022 2.9  0.3 - 6.2 % Final   • Basophil % 07/22/2022 0.2  0.0 - 1.5 % Final   • Immature Grans % 07/22/2022 0.0  0.0 - 0.5 % Final   • Neutrophils, Absolute 07/22/2022 2.41  1.70 - 7.00 10*3/mm3 Final   • Lymphocytes, Absolute 07/22/2022 1.67  0.70 - 3.10 10*3/mm3 Final   • Monocytes, Absolute 07/22/2022 0.33  0.10 - 0.90 10*3/mm3 Final   • Eosinophils, Absolute 07/22/2022 0.13  0.00 - 0.40 10*3/mm3 Final   • Basophils, Absolute  07/22/2022 0.01  0.00 - 0.20 10*3/mm3 Final   • Immature Grans, Absolute 07/22/2022 0.00  0.00 - 0.05 10*3/mm3 Final   • nRBC 07/22/2022 0.0  0.0 - 0.2 /100 WBC Final   Admission on 07/12/2022, Discharged on 07/12/2022   Component Date Value Ref Range Status   • Glucose 07/12/2022 80  65 - 99 mg/dL Final   • BUN 07/12/2022 5 (A) 6 - 20 mg/dL Final   • Creatinine 07/12/2022 0.60  0.57 - 1.00 mg/dL Final   • Sodium 07/12/2022 141  136 - 145 mmol/L Final   • Potassium 07/12/2022 4.0  3.5 - 5.2 mmol/L Final   • Chloride 07/12/2022 107  98 - 107 mmol/L Final   • CO2 07/12/2022 24.4  22.0 - 29.0 mmol/L Final   • Calcium 07/12/2022 9.2  8.6 - 10.5 mg/dL Final   • Total Protein 07/12/2022 6.5  6.0 - 8.5 g/dL Final   • Albumin 07/12/2022 4.35  3.50 - 5.20 g/dL Final   • ALT (SGPT) 07/12/2022 34 (A) 1 - 33 U/L Final   • AST (SGOT) 07/12/2022 28  1 - 32 U/L Final   • Alkaline Phosphatase 07/12/2022 60  39 - 117 U/L Final   • Total Bilirubin 07/12/2022 1.2  0.0 - 1.2 mg/dL Final   • Globulin 07/12/2022 2.2  gm/dL Final   • A/G Ratio 07/12/2022 2.0  g/dL Final   • BUN/Creatinine Ratio 07/12/2022 8.3  7.0 - 25.0 Final   • Anion Gap 07/12/2022 9.6  5.0 - 15.0 mmol/L Final   • eGFR 07/12/2022 111.6  >60.0 mL/min/1.73 Final    National Kidney Foundation and American Society of Nephrology (ASN) Task Force recommended calculation based on the Chronic Kidney Disease Epidemiology Collaboration (CKD-EPI) equation refit without adjustment for race.   • C-Reactive Protein 07/12/2022 <0.30  0.00 - 0.50 mg/dL Final   • Sed Rate 07/12/2022 3  0 - 20 mm/hr Final   • THC, Screen, Urine 07/12/2022 Positive (A) Negative Final   • Phencyclidine (PCP), Urine 07/12/2022 Negative  Negative Final   • Cocaine Screen, Urine 07/12/2022 Negative  Negative Final   • Methamphetamine, Ur 07/12/2022 Positive (A) Negative Final   • Opiate Screen 07/12/2022 Positive (A) Negative Final   • Amphetamine Screen, Urine 07/12/2022 Positive (A) Negative Final   •  Benzodiazepine Screen, Urine 07/12/2022 Negative  Negative Final   • Tricyclic Antidepressants Screen 07/12/2022 Negative  Negative Final   • Methadone Screen, Urine 07/12/2022 Negative  Negative Final   • Barbiturates Screen, Urine 07/12/2022 Negative  Negative Final   • Oxycodone Screen, Urine 07/12/2022 Negative  Negative Final   • Propoxyphene Screen 07/12/2022 Negative  Negative Final   • Buprenorphine, Screen, Urine 07/12/2022 Negative  Negative Final   • Color, UA 07/12/2022 Dark Yellow (A) Yellow, Straw Final   • Appearance, UA 07/12/2022 Clear  Clear Final   • pH, UA 07/12/2022 6.5  5.0 - 8.0 Final   • Specific Gravity, UA 07/12/2022 <=1.005  1.005 - 1.030 Final   • Glucose, UA 07/12/2022 Negative  Negative Final   • Ketones, UA 07/12/2022 Negative  Negative Final   • Bilirubin, UA 07/12/2022 Negative  Negative Final   • Blood, UA 07/12/2022 Negative  Negative Final   • Protein, UA 07/12/2022 Negative  Negative Final   • Leuk Esterase, UA 07/12/2022 Negative  Negative Final   • Nitrite, UA 07/12/2022 Negative  Negative Final   • Urobilinogen, UA 07/12/2022 1.0 E.U./dL  0.2 - 1.0 E.U./dL Final   • WBC 07/12/2022 5.03  3.40 - 10.80 10*3/mm3 Final   • RBC 07/12/2022 4.64  3.77 - 5.28 10*6/mm3 Final   • Hemoglobin 07/12/2022 14.4  12.0 - 15.9 g/dL Final   • Hematocrit 07/12/2022 41.9  34.0 - 46.6 % Final   • MCV 07/12/2022 90.3  79.0 - 97.0 fL Final   • MCH 07/12/2022 31.0  26.6 - 33.0 pg Final   • MCHC 07/12/2022 34.4  31.5 - 35.7 g/dL Final   • RDW 07/12/2022 12.7  12.3 - 15.4 % Final   • RDW-SD 07/12/2022 41.7  37.0 - 54.0 fl Final   • MPV 07/12/2022 10.8  6.0 - 12.0 fL Final   • Platelets 07/12/2022 145  140 - 450 10*3/mm3 Final   • Neutrophil % 07/12/2022 54.6  42.7 - 76.0 % Final   • Lymphocyte % 07/12/2022 35.0  19.6 - 45.3 % Final   • Monocyte % 07/12/2022 8.2  5.0 - 12.0 % Final   • Eosinophil % 07/12/2022 1.8  0.3 - 6.2 % Final   • Basophil % 07/12/2022 0.4  0.0 - 1.5 % Final   • Immature Grans %  07/12/2022 0.0  0.0 - 0.5 % Final   • Neutrophils, Absolute 07/12/2022 2.75  1.70 - 7.00 10*3/mm3 Final   • Lymphocytes, Absolute 07/12/2022 1.76  0.70 - 3.10 10*3/mm3 Final   • Monocytes, Absolute 07/12/2022 0.41  0.10 - 0.90 10*3/mm3 Final   • Eosinophils, Absolute 07/12/2022 0.09  0.00 - 0.40 10*3/mm3 Final   • Basophils, Absolute 07/12/2022 0.02  0.00 - 0.20 10*3/mm3 Final   • Immature Grans, Absolute 07/12/2022 0.00  0.00 - 0.05 10*3/mm3 Final   • nRBC 07/12/2022 0.0  0.0 - 0.2 /100 WBC Final        Procedure:     Urethral dilation-after an appropriate informed consent, the patient was brought to the procedure suite.  The urethra was gently anesthetized with 10 mL of 2% viscous Xylocaine jelly.  After an adequate period of topical anesthesia I went ahead and  dilated with [place] sounds from 16 to 26 [place] sequentially without complication. The patient was given gentamicin as prophylaxis with 80 mg.  Assessment/Plan:   Post infective urethral stricture status post dilation  Narcotic pain medication-patient has significant acute pain that I believe would be an indication for the use of narcotic pain medication.  I discussed the significant risks of pain medication and the fact that this will be a short only option and I will give her no more than a three-day supply of pain medication, I will not plan long-term medication, and that this will be sent to a pain clinic if it at all becomes necessary.  We discussed signing a pain medication agreement and the fact that we're going to run a state [facility] review to be sure the patient is not getting pain medication from elsewhere.  If this is the case, we will not give pain medication as part of the patient's treatment plan of there being prescribed a controlled substance with potential for abuse.  This patient has been well aware of the appropriate dose of such medications including the risks for somnolence, limited ability to drive and/or safety and the significant  potential for overdose.  It has been made clear that these medications are for the prescribed patient only without concomitant use of alcohol or other substance unless prescribed by the medical provider.  Has completed prescribing agreement detailing the terms of continue prescribing him a controlled substance including monitoring Luis Alberto reports, the possibility of urine drug screens, and pill counts.  The patient is aware that we review LUIS ALBERTO reports on a regular basis and scan them into the chart.  History and physical examination exhibited continued safe and appropriate use of controlled substances. We also discussed the fact that the new [place] legislation allows only a three-day prescription for pain medication.  In this situation he will be referred to a chronic pain clinic.              This document has been electronically signed by VERENICE FINK MD September 13, 2022 10:31 EDT    Dictated Utilizing Dragon Dictation: Part of this note may be an electronic transcription/translation of spoken language to printed text using the Dragon Dictation System.

## 2022-09-15 ENCOUNTER — TELEPHONE (OUTPATIENT)
Dept: UROLOGY | Facility: CLINIC | Age: 47
End: 2022-09-15

## 2022-09-15 NOTE — TELEPHONE ENCOUNTER
Patient had dilation Tuesday yesterday and today she feels like she is urinating razor blades she is asking if this is normal

## 2022-09-26 ENCOUNTER — TELEPHONE (OUTPATIENT)
Dept: UROLOGY | Facility: CLINIC | Age: 47
End: 2022-09-26

## 2022-09-26 NOTE — TELEPHONE ENCOUNTER
Patient would like a refill of phenergan called into [facility] pharmacy, chart shows that she has 2 refills but pharmacy is telling her she does not.

## 2022-10-01 ENCOUNTER — HOSPITAL ENCOUNTER (EMERGENCY)
Facility: HOSPITAL | Age: 47
Discharge: HOME | End: 2022-10-01
Attending: EMERGENCY MEDICINE | Admitting: EMERGENCY MEDICINE

## 2022-10-01 ENCOUNTER — APPOINTMENT (OUTPATIENT)
Dept: GENERAL RADIOLOGY | Facility: HOSPITAL | Age: 47
End: 2022-10-01

## 2022-10-01 VITALS
OXYGEN SATURATION: 100 % | TEMPERATURE: 97.8 F | DIASTOLIC BLOOD PRESSURE: 85 MMHG | RESPIRATION RATE: 18 BRPM | WEIGHT: 123 LBS | HEIGHT: 68 IN | BODY MASS INDEX: 18.64 KG/M2 | SYSTOLIC BLOOD PRESSURE: 133 MMHG | HEART RATE: 97 BPM

## 2022-10-01 DIAGNOSIS — N23 RENAL COLIC: Primary | ICD-10-CM

## 2022-10-01 LAB
ALBUMIN SERPL-MCNC: 4.42 G/DL (ref 3.5–5.2)
ALBUMIN/GLOB SERPL: 1.8 G/DL
ALP SERPL-CCNC: 70 U/L (ref 39–117)
ALT SERPL W P-5'-P-CCNC: 41 U/L (ref 1–33)
ANION GAP SERPL CALCULATED.3IONS-SCNC: 9.9 MMOL/L (ref 5–15)
AST SERPL-CCNC: 34 U/L (ref 1–32)
BACTERIA UR QL AUTO: ABNORMAL /HPF
BASOPHILS # BLD AUTO: 0.01 10*3/MM3 (ref 0–0.2)
BASOPHILS NFR BLD AUTO: 0.2 % (ref 0–1.5)
BILIRUB SERPL-MCNC: 0.8 MG/DL (ref 0–1.2)
BILIRUB UR QL STRIP: NEGATIVE
BUN SERPL-MCNC: 6 MG/DL (ref 6–20)
BUN/CREAT SERPL: 10.5 (ref 7–25)
CALCIUM SPEC-SCNC: 9.4 MG/DL (ref 8.6–10.5)
CHLORIDE SERPL-SCNC: 105 MMOL/L (ref 98–107)
CLARITY UR: ABNORMAL
CO2 SERPL-SCNC: 27.1 MMOL/L (ref 22–29)
COLOR UR: ABNORMAL
CREAT SERPL-MCNC: 0.57 MG/DL (ref 0.57–1)
DEPRECATED RDW RBC AUTO: 43.5 FL (ref 37–54)
EGFRCR SERPLBLD CKD-EPI 2021: 113 ML/MIN/1.73
EOSINOPHIL # BLD AUTO: 0.06 10*3/MM3 (ref 0–0.4)
EOSINOPHIL NFR BLD AUTO: 1.2 % (ref 0.3–6.2)
ERYTHROCYTE [DISTWIDTH] IN BLOOD BY AUTOMATED COUNT: 13.1 % (ref 12.3–15.4)
GLOBULIN UR ELPH-MCNC: 2.5 GM/DL
GLUCOSE SERPL-MCNC: 88 MG/DL (ref 65–99)
GLUCOSE UR STRIP-MCNC: NEGATIVE MG/DL
HCT VFR BLD AUTO: 43.1 % (ref 34–46.6)
HGB BLD-MCNC: 14.6 G/DL (ref 12–15.9)
HGB UR QL STRIP.AUTO: ABNORMAL
HOLD SPECIMEN: NORMAL
HOLD SPECIMEN: NORMAL
HYALINE CASTS UR QL AUTO: ABNORMAL /LPF
IMM GRANULOCYTES # BLD AUTO: 0.01 10*3/MM3 (ref 0–0.05)
IMM GRANULOCYTES NFR BLD AUTO: 0.2 % (ref 0–0.5)
KETONES UR QL STRIP: NEGATIVE
LEUKOCYTE ESTERASE UR QL STRIP.AUTO: ABNORMAL
LYMPHOCYTES # BLD AUTO: 1.58 10*3/MM3 (ref 0.7–3.1)
LYMPHOCYTES NFR BLD AUTO: 30.6 % (ref 19.6–45.3)
MCH RBC QN AUTO: 30.9 PG (ref 26.6–33)
MCHC RBC AUTO-ENTMCNC: 33.9 G/DL (ref 31.5–35.7)
MCV RBC AUTO: 91.1 FL (ref 79–97)
MONOCYTES # BLD AUTO: 0.32 10*3/MM3 (ref 0.1–0.9)
MONOCYTES NFR BLD AUTO: 6.2 % (ref 5–12)
NEUTROPHILS NFR BLD AUTO: 3.18 10*3/MM3 (ref 1.7–7)
NEUTROPHILS NFR BLD AUTO: 61.6 % (ref 42.7–76)
NITRITE UR QL STRIP: NEGATIVE
NRBC BLD AUTO-RTO: 0 /100 WBC (ref 0–0.2)
PH UR STRIP.AUTO: 8.5 [PH] (ref 5–8)
PLATELET # BLD AUTO: 163 10*3/MM3 (ref 140–450)
PMV BLD AUTO: 10.1 FL (ref 6–12)
POTASSIUM SERPL-SCNC: 4.3 MMOL/L (ref 3.5–5.2)
PROT SERPL-MCNC: 6.9 G/DL (ref 6–8.5)
PROT UR QL STRIP: ABNORMAL
RBC # BLD AUTO: 4.73 10*6/MM3 (ref 3.77–5.28)
RBC # UR STRIP: ABNORMAL /HPF
REF LAB TEST METHOD: ABNORMAL
SODIUM SERPL-SCNC: 142 MMOL/L (ref 136–145)
SP GR UR STRIP: 1.01 (ref 1–1.03)
SQUAMOUS #/AREA URNS HPF: ABNORMAL /HPF
UROBILINOGEN UR QL STRIP: ABNORMAL
WBC # UR STRIP: ABNORMAL /HPF
WBC NRBC COR # BLD: 5.16 10*3/MM3 (ref 3.4–10.8)
WHOLE BLOOD HOLD COAG: NORMAL
WHOLE BLOOD HOLD SPECIMEN: NORMAL
YEAST URNS QL MICRO: ABNORMAL /HPF

## 2022-10-01 PROCEDURE — 25010000002 MORPHINE PER 10 MG: Performed by: EMERGENCY MEDICINE

## 2022-10-01 PROCEDURE — 36415 COLL VENOUS BLD VENIPUNCTURE: CPT

## 2022-10-01 PROCEDURE — 99283 EMERGENCY DEPT VISIT LOW MDM: CPT

## 2022-10-01 PROCEDURE — 80053 COMPREHEN METABOLIC PANEL: CPT | Performed by: PHYSICIAN ASSISTANT

## 2022-10-01 PROCEDURE — 74018 RADEX ABDOMEN 1 VIEW: CPT

## 2022-10-01 PROCEDURE — 96376 TX/PRO/DX INJ SAME DRUG ADON: CPT

## 2022-10-01 PROCEDURE — 96374 THER/PROPH/DIAG INJ IV PUSH: CPT

## 2022-10-01 PROCEDURE — 63710000001 PROMETHAZINE PER 25 MG: Performed by: PHYSICIAN ASSISTANT

## 2022-10-01 PROCEDURE — 85025 COMPLETE CBC W/AUTO DIFF WBC: CPT | Performed by: PHYSICIAN ASSISTANT

## 2022-10-01 PROCEDURE — 25010000002 HEPARIN LOCK FLUSH PER 10 UNITS: Performed by: PHYSICIAN ASSISTANT

## 2022-10-01 PROCEDURE — 81001 URINALYSIS AUTO W/SCOPE: CPT | Performed by: PHYSICIAN ASSISTANT

## 2022-10-01 RX ORDER — PROMETHAZINE HYDROCHLORIDE 25 MG/1
25 TABLET ORAL ONCE
Status: COMPLETED | OUTPATIENT
Start: 2022-10-01 | End: 2022-10-01

## 2022-10-01 RX ORDER — MORPHINE SULFATE 2 MG/ML
2 INJECTION, SOLUTION INTRAMUSCULAR; INTRAVENOUS ONCE
Status: COMPLETED | OUTPATIENT
Start: 2022-10-01 | End: 2022-10-01

## 2022-10-01 RX ORDER — SODIUM CHLORIDE 0.9 % (FLUSH) 0.9 %
10 SYRINGE (ML) INJECTION AS NEEDED
Status: DISCONTINUED | OUTPATIENT
Start: 2022-10-01 | End: 2022-10-01 | Stop reason: HOSPADM

## 2022-10-01 RX ORDER — HEPARIN SODIUM (PORCINE) LOCK FLUSH IV SOLN 100 UNIT/ML 100 UNIT/ML
300 SOLUTION INTRAVENOUS ONCE
Status: COMPLETED | OUTPATIENT
Start: 2022-10-01 | End: 2022-10-01

## 2022-10-01 RX ADMIN — SODIUM CHLORIDE 1000 ML: 9 INJECTION, SOLUTION INTRAVENOUS at 13:55

## 2022-10-01 RX ADMIN — PROMETHAZINE HYDROCHLORIDE 25 MG: 25 TABLET ORAL at 14:28

## 2022-10-01 RX ADMIN — MORPHINE SULFATE 4 MG: 4 INJECTION, SOLUTION INTRAMUSCULAR; INTRAVENOUS at 13:55

## 2022-10-01 RX ADMIN — Medication 300 UNITS: at 16:20

## 2022-10-01 RX ADMIN — MORPHINE SULFATE 2 MG: 2 INJECTION, SOLUTION INTRAMUSCULAR; INTRAVENOUS at 16:20

## 2022-10-01 NOTE — ED PROVIDER NOTES
Subjective     History provided by:  Patient   used: No    Flank Pain  Pain location:  R flank  Pain quality: sharp    Pain radiates to:  Does not radiate  Pain severity:  Moderate  Onset quality:  Sudden  Duration:  2 days  Timing:  Constant  Progression:  Worsening  Chronicity:  New  Relieved by:  Nothing  Worsened by:  Nothing  Ineffective treatments:  None tried  Associated symptoms: hematuria        Review of Systems   Constitutional: Negative.    HENT: Negative.    Eyes: Negative.    Respiratory: Negative.    Cardiovascular: Negative.    Gastrointestinal: Negative.    Endocrine: Negative.    Genitourinary: Positive for flank pain and hematuria.   Skin: Negative.    Allergic/Immunologic: Negative.    Neurological: Negative.    Hematological: Negative.    Psychiatric/Behavioral: Negative.    All other systems reviewed and are negative.      Past Medical History:   Diagnosis Date   • Abdominal pain    • Abdominal swelling    • Hoyos's disease    • Bipolar 1 disorder (HCC)    • Brain tumor (benign) (HCC)    • Brain tumor (HCC)     R Frontal Lobe per pt   • Brain tumor (HCC) 2014   • Constipation    • DDD (degenerative disc disease), cervical 05/29/2017   • DDD (degenerative disc disease), cervical    • Diarrhea    • Fibromyalgia    • IBS (irritable bowel syndrome)    • Migraine    • Nausea & vomiting    • PONV (postoperative nausea and vomiting)    • PTSD (post-traumatic stress disorder)    • Rectal bleeding        Allergies   Allergen Reactions   • Ativan [Lorazepam] Hallucinations     confusion   • Sulfa Antibiotics Shortness Of Breath and Swelling   • Sulfa Antibiotics Anaphylaxis   • Reglan [Metoclopramide] Angioedema   • Compazine [Prochlorperazine Edisylate] Hives   • Demerol [Meperidine] Hives   • Droperidol Itching   • Metoclopramide Swelling   • Toradol [Ketorolac Tromethamine] Hives and Itching   • Toradol [Ketorolac Tromethamine] Hives   • Zofran [Ondansetron Hcl] Rash   • Zosyn  [Piperacillin Sod-Tazobactam So] Hives       Past Surgical History:   Procedure Laterality Date   • ANAL SCOPE N/A 2016    Procedure: ANAL SCOPE;  Surgeon: Kael Lopez MD;  Location: [facility] OR;  Service:    • APPENDECTOMY     • COLONOSCOPY N/A 2016    Procedure: COLONOSCOPY  CPTCODE:69443;  Surgeon: Jose Antonio Belle III, MD;  Location: [facility] OR;  Service:    • COLONOSCOPY N/A 2016    Procedure: COLONOSCOPY (10805) CPT;  Surgeon: Jose Antonio Belle III, MD;  Location: [facility] OR;  Service:    • CYSTOSCOPY RETROGRADE PYELOGRAM Bilateral 2017    Procedure: CYSTOSCOPY RETROGRADE PYELOGRAM;  Surgeon: Mu Blunt MD;  Location: [facility] OR;  Service:    • ENDOSCOPY N/A 2016    Procedure: ESOPHAGOGASTRODUODENOSCOPY WITH BIOPSY  CPTCODE:56900;  Surgeon: Jose Antonio Belle III, MD;  Location: [facility] OR;  Service:    • HEMORRHOIDECTOMY N/A 2016    Procedure: HEMORRHOID STAPLING;  Surgeon: Kael Lopez MD;  Location: [facility] OR;  Service:    • HYSTERECTOMY     • KNEE SURGERY     • LAPAROSCOPIC SALPINGOOPHERECTOMY     • PORTACATH PLACEMENT N/A 2017    Procedure: INSERTION OF PORTACATH;  Surgeon: Celso Arredondo MD;  Location: [facility];  Service:    • SHOULDER SURGERY      3 times       Family History   Problem Relation Age of Onset   • Crohn's disease Other    • Hypertension Other    • Diabetes Other    • Irritable bowel syndrome Other    • No Known Problems Father    • No Known Problems Mother        Social History     Socioeconomic History   • Marital status:    Tobacco Use   • Smoking status: Former Smoker     Packs/day: 1.00     Years: 20.00     Pack years: 20.00     Quit date: 2015     Years since quittin.9   • Smokeless tobacco: Never Used   Vaping Use   • Vaping Use: Never used   Substance and Sexual Activity   • Alcohol use: No   • Drug use: Yes     Types: Marijuana     Comment: for pain   • Sexual activity: Defer     Birth control/protection:  Surgical           Objective   Physical Exam  Vitals and nursing note reviewed.   Constitutional:       General: She is not in acute distress.     Appearance: Normal appearance. She is normal weight. She is not ill-appearing, toxic-appearing or diaphoretic.      Comments: Sitting up in bed. No distress. Playing on phone.    HENT:      Head: Normocephalic and atraumatic.      Right Ear: External ear normal.      Left Ear: External ear normal.      Mouth/Throat:      Mouth: Mucous membranes are moist.      Pharynx: Oropharynx is clear.   Eyes:      Extraocular Movements: Extraocular movements intact.      Conjunctiva/sclera: Conjunctivae normal.      Pupils: Pupils are equal, round, and reactive to light.   Cardiovascular:      Rate and Rhythm: Normal rate and regular rhythm.      Pulses: Normal pulses.      Heart sounds: Normal heart sounds.   Pulmonary:      Effort: Pulmonary effort is normal.      Breath sounds: Normal breath sounds.   Abdominal:      General: Abdomen is flat. Bowel sounds are normal. There is no distension.      Palpations: Abdomen is soft. There is no mass.      Tenderness: There is no abdominal tenderness. There is no right CVA tenderness, left CVA tenderness, guarding or rebound.      Hernia: No hernia is present.   Musculoskeletal:         General: Normal range of motion.      Cervical back: Normal range of motion and neck supple.   Skin:     General: Skin is warm and dry.      Capillary Refill: Capillary refill takes less than 2 seconds.   Neurological:      General: No focal deficit present.      Mental Status: She is alert and oriented to person, place, and time. Mental status is at baseline.   Psychiatric:         Mood and Affect: Mood normal.         Behavior: Behavior normal.         Thought Content: Thought content normal.         Judgment: Judgment normal.         Procedures           ED Course  ED Course as of 10/01/22 1743   Sat Oct 01, 2022   1558 XR Abdomen  KUB  IMPRESSION:  Negative KUB.     Signer Name: Sang Painting MD   Signed: 10/1/2022 3:55 PM   Workstation Name: [id number]    [facility]          Specimen Collected: 10/01/22 15:55 Last Resulted: 10/01/22 15:55         [ML]   1742 PT instructed to f/u with Dr. Blunt on Monday. Discussed sx and red flags that would warrant return to the ED.  [ML]      ED Course User Index  [ML] Fatimah Ortiz PA                                           MDM  Number of Diagnoses or Management Options     Amount and/or Complexity of Data Reviewed  Clinical lab tests: ordered and reviewed  Tests in the radiology section of CPT®: ordered and reviewed    Risk of Complications, Morbidity, and/or Mortality  Presenting problems: low  Diagnostic procedures: low  Management options: low    Patient Progress  Patient progress: improved      Final diagnoses:   Renal colic       ED Disposition  ED Disposition     ED Disposition   Discharge    Condition   Stable    Comment   Pt ambulated out of ED. IV taken out.             Mabel Patterson, APRN  [address] [facility]  [address]  [facility] [address]  [phone]    Schedule an appointment as soon as possible for a visit in 1 day      Mu Blunt MD  [address] [facility] [address]  [facility] [address]  [phone]    Schedule an appointment as soon as possible for a visit in 1 day           Medication List      No changes were made to your prescriptions during this visit.          Fatimah Ortiz PA  10/01/22 4841

## 2022-10-03 ENCOUNTER — APPOINTMENT (OUTPATIENT)
Dept: CT IMAGING | Facility: HOSPITAL | Age: 47
End: 2022-10-03

## 2022-10-03 ENCOUNTER — HOSPITAL ENCOUNTER (EMERGENCY)
Facility: HOSPITAL | Age: 47
Discharge: HOME | End: 2022-10-03
Attending: EMERGENCY MEDICINE | Admitting: EMERGENCY MEDICINE

## 2022-10-03 VITALS
WEIGHT: 123 LBS | SYSTOLIC BLOOD PRESSURE: 116 MMHG | HEIGHT: 68 IN | RESPIRATION RATE: 20 BRPM | BODY MASS INDEX: 18.64 KG/M2 | OXYGEN SATURATION: 99 % | HEART RATE: 88 BPM | TEMPERATURE: 97.5 F | DIASTOLIC BLOOD PRESSURE: 77 MMHG

## 2022-10-03 DIAGNOSIS — N02.8 BERGER'S DISEASE: Primary | ICD-10-CM

## 2022-10-03 LAB
ALBUMIN SERPL-MCNC: 4.58 G/DL (ref 3.5–5.2)
ALBUMIN/GLOB SERPL: 1.9 G/DL
ALP SERPL-CCNC: 72 U/L (ref 39–117)
ALT SERPL W P-5'-P-CCNC: 40 U/L (ref 1–33)
ANION GAP SERPL CALCULATED.3IONS-SCNC: 12.6 MMOL/L (ref 5–15)
AST SERPL-CCNC: 36 U/L (ref 1–32)
BACTERIA UR QL AUTO: ABNORMAL /HPF
BASOPHILS # BLD AUTO: 0.01 10*3/MM3 (ref 0–0.2)
BASOPHILS NFR BLD AUTO: 0.2 % (ref 0–1.5)
BILIRUB SERPL-MCNC: 1.5 MG/DL (ref 0–1.2)
BILIRUB UR QL STRIP: ABNORMAL
BUN SERPL-MCNC: 9 MG/DL (ref 6–20)
BUN/CREAT SERPL: 15.5 (ref 7–25)
CALCIUM SPEC-SCNC: 9.5 MG/DL (ref 8.6–10.5)
CHLORIDE SERPL-SCNC: 106 MMOL/L (ref 98–107)
CLARITY UR: ABNORMAL
CO2 SERPL-SCNC: 22.4 MMOL/L (ref 22–29)
COLOR UR: ABNORMAL
CREAT SERPL-MCNC: 0.58 MG/DL (ref 0.57–1)
DEPRECATED RDW RBC AUTO: 43.3 FL (ref 37–54)
EGFRCR SERPLBLD CKD-EPI 2021: 112.5 ML/MIN/1.73
EOSINOPHIL # BLD AUTO: 0.08 10*3/MM3 (ref 0–0.4)
EOSINOPHIL NFR BLD AUTO: 1.4 % (ref 0.3–6.2)
ERYTHROCYTE [DISTWIDTH] IN BLOOD BY AUTOMATED COUNT: 13.2 % (ref 12.3–15.4)
GLOBULIN UR ELPH-MCNC: 2.4 GM/DL
GLUCOSE SERPL-MCNC: 110 MG/DL (ref 65–99)
GLUCOSE UR STRIP-MCNC: NEGATIVE MG/DL
HCT VFR BLD AUTO: 43.5 % (ref 34–46.6)
HGB BLD-MCNC: 15 G/DL (ref 12–15.9)
HGB UR QL STRIP.AUTO: ABNORMAL
HYALINE CASTS UR QL AUTO: ABNORMAL /LPF
IMM GRANULOCYTES # BLD AUTO: 0.01 10*3/MM3 (ref 0–0.05)
IMM GRANULOCYTES NFR BLD AUTO: 0.2 % (ref 0–0.5)
KETONES UR QL STRIP: ABNORMAL
LEUKOCYTE ESTERASE UR QL STRIP.AUTO: ABNORMAL
LYMPHOCYTES # BLD AUTO: 1.4 10*3/MM3 (ref 0.7–3.1)
LYMPHOCYTES NFR BLD AUTO: 24.9 % (ref 19.6–45.3)
MCH RBC QN AUTO: 31.2 PG (ref 26.6–33)
MCHC RBC AUTO-ENTMCNC: 34.5 G/DL (ref 31.5–35.7)
MCV RBC AUTO: 90.4 FL (ref 79–97)
MONOCYTES # BLD AUTO: 0.38 10*3/MM3 (ref 0.1–0.9)
MONOCYTES NFR BLD AUTO: 6.8 % (ref 5–12)
NEUTROPHILS NFR BLD AUTO: 3.74 10*3/MM3 (ref 1.7–7)
NEUTROPHILS NFR BLD AUTO: 66.5 % (ref 42.7–76)
NITRITE UR QL STRIP: NEGATIVE
NRBC BLD AUTO-RTO: 0 /100 WBC (ref 0–0.2)
PH UR STRIP.AUTO: 5.5 [PH] (ref 5–8)
PLATELET # BLD AUTO: 162 10*3/MM3 (ref 140–450)
PMV BLD AUTO: 9.9 FL (ref 6–12)
POTASSIUM SERPL-SCNC: 3.7 MMOL/L (ref 3.5–5.2)
PROT SERPL-MCNC: 7 G/DL (ref 6–8.5)
PROT UR QL STRIP: ABNORMAL
RBC # BLD AUTO: 4.81 10*6/MM3 (ref 3.77–5.28)
RBC # UR STRIP: ABNORMAL /HPF
REF LAB TEST METHOD: ABNORMAL
SODIUM SERPL-SCNC: 141 MMOL/L (ref 136–145)
SP GR UR STRIP: 1.02 (ref 1–1.03)
SQUAMOUS #/AREA URNS HPF: ABNORMAL /HPF
UROBILINOGEN UR QL STRIP: ABNORMAL
WBC # UR STRIP: ABNORMAL /HPF
WBC NRBC COR # BLD: 5.62 10*3/MM3 (ref 3.4–10.8)

## 2022-10-03 PROCEDURE — 74176 CT ABD & PELVIS W/O CONTRAST: CPT

## 2022-10-03 PROCEDURE — 80053 COMPREHEN METABOLIC PANEL: CPT | Performed by: EMERGENCY MEDICINE

## 2022-10-03 PROCEDURE — 81001 URINALYSIS AUTO W/SCOPE: CPT | Performed by: EMERGENCY MEDICINE

## 2022-10-03 PROCEDURE — 85025 COMPLETE CBC W/AUTO DIFF WBC: CPT | Performed by: EMERGENCY MEDICINE

## 2022-10-03 PROCEDURE — 99283 EMERGENCY DEPT VISIT LOW MDM: CPT

## 2022-10-03 PROCEDURE — 63710000001 PROMETHAZINE PER 25 MG: Performed by: EMERGENCY MEDICINE

## 2022-10-03 PROCEDURE — 74176 CT ABD & PELVIS W/O CONTRAST: CPT | Performed by: RADIOLOGY

## 2022-10-03 PROCEDURE — 36415 COLL VENOUS BLD VENIPUNCTURE: CPT

## 2022-10-03 RX ORDER — POTASSIUM CHLORIDE 20 MEQ/1
20 TABLET, EXTENDED RELEASE ORAL ONCE
Status: COMPLETED | OUTPATIENT
Start: 2022-10-03 | End: 2022-10-03

## 2022-10-03 RX ORDER — PROMETHAZINE HYDROCHLORIDE 25 MG/1
25 TABLET ORAL ONCE
Status: COMPLETED | OUTPATIENT
Start: 2022-10-03 | End: 2022-10-03

## 2022-10-03 RX ORDER — OXYCODONE AND ACETAMINOPHEN 10; 325 MG/1; MG/1
1 TABLET ORAL ONCE
Status: COMPLETED | OUTPATIENT
Start: 2022-10-03 | End: 2022-10-03

## 2022-10-03 RX ADMIN — OXYCODONE HYDROCHLORIDE AND ACETAMINOPHEN 1 TABLET: 10; 325 TABLET ORAL at 08:42

## 2022-10-03 RX ADMIN — PROMETHAZINE HYDROCHLORIDE 25 MG: 25 TABLET ORAL at 07:46

## 2022-10-03 RX ADMIN — POTASSIUM CHLORIDE 20 MEQ: 20 TABLET, EXTENDED RELEASE ORAL at 08:42

## 2022-10-04 NOTE — ED PROVIDER NOTES
Subjective   History of Present Illness  Patient presents to ER with sharp right flank pain, history kidney stones    Flank Pain  Pain location:  L flank  Pain quality: aching and cramping    Pain severity:  Moderate  Onset quality:  Gradual  Timing:  Intermittent  Progression:  Waxing and waning  Chronicity:  Chronic  Context comment:  Kidney stones  Relieved by:  Nothing  Worsened by:  Nothing  Ineffective treatments:  None tried  Associated symptoms: anorexia, fatigue and nausea        Review of Systems   Constitutional: Positive for activity change and fatigue.   HENT: Negative.    Eyes: Negative.    Respiratory: Negative.    Cardiovascular: Negative.    Gastrointestinal: Positive for anorexia and nausea.   Endocrine: Negative.    Genitourinary: Positive for flank pain.   Skin: Negative.    Allergic/Immunologic: Negative.    Neurological: Negative.    Hematological: Negative.        Past Medical History:   Diagnosis Date   • Abdominal pain    • Abdominal swelling    • Hoyos's disease    • Bipolar 1 disorder (HCC)    • Brain tumor (benign) (HCC)    • Brain tumor (HCC)     R Frontal Lobe per pt   • Brain tumor (HCC) 2014   • Constipation    • DDD (degenerative disc disease), cervical 05/29/2017   • DDD (degenerative disc disease), cervical    • Diarrhea    • Fibromyalgia    • IBS (irritable bowel syndrome)    • Migraine    • Nausea & vomiting    • PONV (postoperative nausea and vomiting)    • PTSD (post-traumatic stress disorder)    • Rectal bleeding        Allergies   Allergen Reactions   • Ativan [Lorazepam] Hallucinations     confusion   • Sulfa Antibiotics Shortness Of Breath and Swelling   • Sulfa Antibiotics Anaphylaxis   • Reglan [Metoclopramide] Angioedema   • Compazine [Prochlorperazine Edisylate] Hives   • Demerol [Meperidine] Hives   • Droperidol Itching   • Metoclopramide Swelling   • Toradol [Ketorolac Tromethamine] Hives and Itching   • Toradol [Ketorolac Tromethamine] Hives   • Zofran [Ondansetron  Hcl] Rash   • Zosyn [Piperacillin Sod-Tazobactam So] Hives       Past Surgical History:   Procedure Laterality Date   • ANAL SCOPE N/A 2016    Procedure: ANAL SCOPE;  Surgeon: Kael Lopez MD;  Location: [facility] OR;  Service:    • APPENDECTOMY     • COLONOSCOPY N/A 2016    Procedure: COLONOSCOPY  CPTCODE:80687;  Surgeon: Jose Antonio Belle III, MD;  Location: [facility] OR;  Service:    • COLONOSCOPY N/A 2016    Procedure: COLONOSCOPY (53366) CPT;  Surgeon: Jose Antonio Belle III, MD;  Location: [facility] OR;  Service:    • CYSTOSCOPY RETROGRADE PYELOGRAM Bilateral 2017    Procedure: CYSTOSCOPY RETROGRADE PYELOGRAM;  Surgeon: Mu Blunt MD;  Location: [facility] OR;  Service:    • ENDOSCOPY N/A 2016    Procedure: ESOPHAGOGASTRODUODENOSCOPY WITH BIOPSY  CPTCODE:23159;  Surgeon: Jose Antonio Belle III, MD;  Location: [facility] OR;  Service:    • HEMORRHOIDECTOMY N/A 2016    Procedure: HEMORRHOID STAPLING;  Surgeon: Kael Lopez MD;  Location: [facility] OR;  Service:    • HYSTERECTOMY     • KNEE SURGERY     • LAPAROSCOPIC SALPINGOOPHERECTOMY     • PORTACATH PLACEMENT N/A 2017    Procedure: INSERTION OF PORTACATH;  Surgeon: Celso Arredondo MD;  Location: [facility] OR;  Service:    • SHOULDER SURGERY      3 times       Family History   Problem Relation Age of Onset   • Crohn's disease Other    • Hypertension Other    • Diabetes Other    • Irritable bowel syndrome Other    • No Known Problems Father    • No Known Problems Mother        Social History     Socioeconomic History   • Marital status:    Tobacco Use   • Smoking status: Former Smoker     Packs/day: 1.00     Years: 20.00     Pack years: 20.00     Quit date: 2015     Years since quittin.9   • Smokeless tobacco: Never Used   Vaping Use   • Vaping Use: Never used   Substance and Sexual Activity   • Alcohol use: No   • Drug use: Yes     Types: Marijuana     Comment: for pain   • Sexual activity: Defer     Birth  control/protection: Surgical           Objective   Physical Exam  Vitals and nursing note reviewed.   Constitutional:       General: She is in acute distress.   HENT:      Head: Normocephalic.      Right Ear: Tympanic membrane normal.      Mouth/Throat:      Mouth: Mucous membranes are moist.   Eyes:      Pupils: Pupils are equal, round, and reactive to light.   Cardiovascular:      Rate and Rhythm: Normal rate.      Pulses: Normal pulses.   Pulmonary:      Effort: Pulmonary effort is normal.   Abdominal:      General: Abdomen is flat.      Tenderness: There is abdominal tenderness.   Musculoskeletal:         General: Normal range of motion.      Cervical back: Normal range of motion.   Skin:     General: Skin is warm.      Capillary Refill: Capillary refill takes less than 2 seconds.   Neurological:      Mental Status: She is alert.   Psychiatric:         Mood and Affect: Mood normal.         Procedures           ED Course                                           MDM    Final diagnoses:   Hoyos's disease       ED Disposition  ED Disposition     ED Disposition   Discharge    Condition   Stable    Comment   --             Mabel Patterson, APRN  [address] [address]  [phone]    Schedule an appointment as soon as possible for a visit   If symptoms worsen         Medication List      No changes were made to your prescriptions during this visit.          Mitesh Garza MD  10/04/22 8600

## 2022-10-11 ENCOUNTER — OFFICE VISIT (OUTPATIENT)
Dept: UROLOGY | Facility: CLINIC | Age: 47
End: 2022-10-11

## 2022-10-11 VITALS — HEIGHT: 68 IN | WEIGHT: 123.02 LBS | BODY MASS INDEX: 18.64 KG/M2

## 2022-10-11 DIAGNOSIS — N35.028 OTHER POST-TRAUMATIC URETHRAL STRICTURE, FEMALE: Primary | ICD-10-CM

## 2022-10-11 DIAGNOSIS — N23 RENAL COLIC: ICD-10-CM

## 2022-10-11 PROCEDURE — 96372 THER/PROPH/DIAG INJ SC/IM: CPT | Performed by: UROLOGY

## 2022-10-11 PROCEDURE — 53661 DILATION OF URETHRA: CPT | Performed by: UROLOGY

## 2022-10-11 RX ORDER — OXYCODONE AND ACETAMINOPHEN 10; 325 MG/1; MG/1
1 TABLET ORAL EVERY 6 HOURS PRN
Qty: 20 TABLET | Refills: 0 | Status: SHIPPED | OUTPATIENT
Start: 2022-10-11 | End: 2022-11-08 | Stop reason: SDUPTHER

## 2022-10-11 RX ORDER — GENTAMICIN SULFATE 40 MG/ML
80 INJECTION, SOLUTION INTRAMUSCULAR; INTRAVENOUS ONCE
Status: COMPLETED | OUTPATIENT
Start: 2022-10-11 | End: 2022-10-11

## 2022-10-11 RX ADMIN — GENTAMICIN SULFATE 80 MG: 40 INJECTION, SOLUTION INTRAMUSCULAR; INTRAVENOUS at 08:09

## 2022-10-11 NOTE — PROGRESS NOTES
Chief Complaint:      Chief Complaint   Patient presents with   • urethral stricture       HPI:   47 y.o. female here with severe posttraumatic stricture for dilation.    Past Medical History:     Past Medical History:   Diagnosis Date   • Abdominal pain    • Abdominal swelling    • Hoyos's disease    • Bipolar 1 disorder (HCC)    • Brain tumor (benign) (HCC)    • Brain tumor (HCC)     R Frontal Lobe per pt   • Brain tumor (HCC) 2014   • Constipation    • DDD (degenerative disc disease), cervical 05/29/2017   • DDD (degenerative disc disease), cervical    • Diarrhea    • Fibromyalgia    • IBS (irritable bowel syndrome)    • Migraine    • Nausea & vomiting    • PONV (postoperative nausea and vomiting)    • PTSD (post-traumatic stress disorder)    • Rectal bleeding        Current Meds:     Current Outpatient Medications   Medication Sig Dispense Refill   • albuterol (PROVENTIL HFA;VENTOLIN HFA) 108 (90 Base) MCG/ACT inhaler Inhale 2 puffs 2 (Two) Times a Day.     • Azelastine HCl 137 MCG/SPRAY solution 1 spray into each nostril As Needed (Allergies).     • busPIRone (BUSPAR) 15 MG tablet Take 15 mg by mouth 3 (three) times a day        • ciprofloxacin (CIPRO) 500 MG tablet Take 1 tablet by mouth 2 (Two) Times a Day. 19 tablet 0   • diclofenac (VOLTAREN) 1 % gel gel      • divalproex (DEPAKOTE) 500 MG DR tablet Take 1 tablet by mouth 3 (Three) Times a Day. 90 tablet 2   • docusate sodium (COLACE) 100 MG capsule Take 1 capsule by mouth 2 (Two) Times a Day. 20 capsule 0   • docusate sodium (COLACE) 100 MG capsule Take 1 capsule by mouth 2 (Two) Times a Day As Needed for Constipation. 60 capsule 0   • fluticasone (VERAMYST) 27.5 MCG/SPRAY nasal spray 2 sprays into each nostril Daily.     • megestrol (MEGACE) 20 MG tablet Take 1 tablet by mouth Daily. 30 tablet 3   • metroNIDAZOLE (FLAGYL) 500 MG tablet Take 1 tablet by mouth 3 (Three) Times a Day. 30 tablet 0   • montelukast (SINGULAIR) 10 MG tablet Take 10 mg by mouth  Every Night.     • oxyCODONE-acetaminophen (Percocet)  MG per tablet Take 1 tablet by mouth Every 6 (Six) Hours As Needed for Moderate Pain. 20 tablet 0   • oxyCODONE-acetaminophen (PERCOCET) 5-325 MG per tablet Take 1 tablet by mouth Every 8 (Eight) Hours As Needed for Moderate Pain . 12 tablet 0   • pantoprazole (PROTONIX) 40 MG EC tablet Take 40 mg by mouth 2 (Two) Times a Day As Needed.     • phenazopyridine (PYRIDIUM) 100 MG tablet      • polyethylene glycol (MIRALAX) 17 GM/SCOOP powder Take 17 g by mouth Daily. 225 g 0   • predniSONE (DELTASONE) 50 MG tablet Take 1 tablet by mouth Daily. 5 tablet 0   • promethazine (PHENERGAN) 25 MG tablet Take 1 tablet by mouth Every 6 (Six) Hours As Needed for Nausea or Vomiting. 21 tablet 2   • sertraline (ZOLOFT) 100 MG tablet Take 100 mg by mouth 2 (Two) Times a Day.     • SUMAtriptan (IMITREX) 6 MG/0.5ML injection Inject 6 mg under the skin 1 (One) Time.     • terazosin (HYTRIN) 2 MG capsule Take 1 capsule by mouth Every Night for 14 days. 14 capsule 0   • traMADol (ULTRAM) 50 MG tablet        No current facility-administered medications for this visit.        Allergies:      Allergies   Allergen Reactions   • Ativan [Lorazepam] Hallucinations     confusion   • Sulfa Antibiotics Shortness Of Breath and Swelling   • Sulfa Antibiotics Anaphylaxis   • Reglan [Metoclopramide] Angioedema   • Compazine [Prochlorperazine Edisylate] Hives   • Demerol [Meperidine] Hives   • Droperidol Itching   • Metoclopramide Swelling   • Toradol [Ketorolac Tromethamine] Hives and Itching   • Toradol [Ketorolac Tromethamine] Hives   • Zofran [Ondansetron Hcl] Rash   • Zosyn [Piperacillin Sod-Tazobactam So] Hives        Past Surgical History:     Past Surgical History:   Procedure Laterality Date   • ANAL SCOPE N/A 7/28/2016    Procedure: ANAL SCOPE;  Surgeon: Kael Lopez MD;  Location: [facility];  Service:    • APPENDECTOMY     • COLONOSCOPY N/A 6/30/2016    Procedure: COLONOSCOPY   CPTCODE:19371;  Surgeon: Jose Antonio Belle III, MD;  Location: [facility] OR;  Service:    • COLONOSCOPY N/A 2016    Procedure: COLONOSCOPY (03446) CPT;  Surgeon: Jose Antonio Belle III, MD;  Location: [facility] OR;  Service:    • CYSTOSCOPY RETROGRADE PYELOGRAM Bilateral 2017    Procedure: CYSTOSCOPY RETROGRADE PYELOGRAM;  Surgeon: Mu Blunt MD;  Location: [facility] OR;  Service:    • ENDOSCOPY N/A 2016    Procedure: ESOPHAGOGASTRODUODENOSCOPY WITH BIOPSY  CPTCODE:23667;  Surgeon: Jose Antonio Belle III, MD;  Location: [facility] OR;  Service:    • HEMORRHOIDECTOMY N/A 2016    Procedure: HEMORRHOID STAPLING;  Surgeon: Kael Lopez MD;  Location: [facility] OR;  Service:    • HYSTERECTOMY     • KNEE SURGERY     • LAPAROSCOPIC SALPINGOOPHERECTOMY     • PORTACATH PLACEMENT N/A 2017    Procedure: INSERTION OF PORTACATH;  Surgeon: Celso Arredondo MD;  Location: [facility];  Service:    • SHOULDER SURGERY      3 times       Social History:     Social History     Socioeconomic History   • Marital status:    Tobacco Use   • Smoking status: Former     Packs/day: 1.00     Years: 20.00     Pack years: 20.00     Types: Cigarettes     Quit date: 2015     Years since quittin.9   • Smokeless tobacco: Never   Vaping Use   • Vaping Use: Never used   Substance and Sexual Activity   • Alcohol use: No   • Drug use: Yes     Types: Marijuana     Comment: for pain   • Sexual activity: Defer     Birth control/protection: Surgical       Family History:     Family History   Problem Relation Age of Onset   • Crohn's disease Other    • Hypertension Other    • Diabetes Other    • Irritable bowel syndrome Other    • No Known Problems Father    • No Known Problems Mother        Review of Systems:     Review of Systems   Constitutional: Negative.  Negative for activity change, appetite change, chills, diaphoresis, fatigue and unexpected weight change.   HENT: Negative for congestion, dental problem,  drooling, ear discharge, ear pain, facial swelling, hearing loss, mouth sores, nosebleeds, postnasal drip, rhinorrhea, sinus pressure, sneezing, sore throat, tinnitus, trouble swallowing and voice change.    Eyes: Negative.  Negative for photophobia, pain, discharge, redness, itching and visual disturbance.   Respiratory: Negative.  Negative for apnea, cough, choking, chest tightness, shortness of breath, wheezing and stridor.    Cardiovascular: Negative.  Negative for chest pain, palpitations and leg swelling.   Gastrointestinal: Negative.  Negative for abdominal distention, abdominal pain, anal bleeding, blood in stool, constipation, diarrhea, nausea, rectal pain and vomiting.   Endocrine: Negative.  Negative for cold intolerance, heat intolerance, polydipsia, polyphagia and polyuria.   Genitourinary: Positive for difficulty urinating.   Musculoskeletal: Negative.  Negative for arthralgias, back pain, gait problem, joint swelling, myalgias, neck pain and neck stiffness.   Skin: Negative.  Negative for color change, pallor, rash and wound.   Allergic/Immunologic: Negative.  Negative for environmental allergies, food allergies and immunocompromised state.   Neurological: Negative.  Negative for dizziness, tremors, seizures, syncope, facial asymmetry, speech difficulty, weakness, light-headedness, numbness and headaches.   Hematological: Negative.  Negative for adenopathy. Does not bruise/bleed easily.   Psychiatric/Behavioral: Negative for agitation, behavioral problems, confusion, decreased concentration, dysphoric mood, hallucinations, self-injury, sleep disturbance and suicidal ideas. The patient is not nervous/anxious and is not hyperactive.    All other systems reviewed and are negative.      Physical Exam:     Physical Exam  Constitutional:       Appearance: She is well-developed.   HENT:      Head: Normocephalic and atraumatic.      Right Ear: External ear normal.      Left Ear: External ear normal.   Eyes:       Conjunctiva/sclera: Conjunctivae normal.      Pupils: Pupils are equal, round, and reactive to light.   Cardiovascular:      Rate and Rhythm: Normal rate and regular rhythm.      Heart sounds: Normal heart sounds.   Pulmonary:      Effort: Pulmonary effort is normal.      Breath sounds: Normal breath sounds.   Abdominal:      General: Bowel sounds are normal. There is no distension.      Palpations: Abdomen is soft. There is no mass.      Tenderness: There is no abdominal tenderness. There is no guarding or rebound.   Genitourinary:     General: Normal vulva.      Vagina: No vaginal discharge.   Musculoskeletal:         General: Normal range of motion.   Skin:     General: Skin is warm and dry.   Neurological:      Mental Status: She is alert.      Deep Tendon Reflexes: Reflexes are normal and symmetric.   Psychiatric:         Behavior: Behavior normal.         Thought Content: Thought content normal.         Judgment: Judgment normal.         I have reviewed the following portions of the patient's history: Allergies, current medications, past family history, past medical history, past social history, past surgical history, problem list, and ROS and confirm it is accurate.    Recent Image (CT and/or KUB):      CT Abdomen and Pelvis: No results found for this or any previous visit.       CT Stone Protocol: Results for orders placed during the hospital encounter of 10/03/22    CT Abdomen Pelvis Stone Protocol    Narrative  EXAM:  CT Abdomen and Pelvis Without Intravenous Contrast    EXAM DATE:  10/3/2022 7:24 AM    CLINICAL HISTORY:  Flank pain, kidney stone suspected    TECHNIQUE:  Axial computed tomography images of the abdomen and pelvis without  intravenous contrast.  Sagittal and coronal reformatted images were  created and reviewed.  This CT exam was performed using one or more of  the following dose reduction techniques:  automated exposure control,  adjustment of the mA and/or kV according to patient  size, and/or use of  iterative reconstruction technique.    COMPARISON:  CT ABDOMEN PELVIS STONE PROTOCOL- dated 07/19/2022    FINDINGS:  LUNG BASES:  Unremarkable.  No mass.  No consolidation.    ABDOMEN:  LIVER:  Unremarkable.  GALLBLADDER AND BILE DUCTS:  Unremarkable.  No calcified stones.  No  ductal dilation.  PANCREAS:  Unremarkable.  No ductal dilation.  SPLEEN:  Unremarkable.  No splenomegaly.  ADRENALS:  Unremarkable.  No mass.  KIDNEYS AND URETERS:  Unremarkable.  No obstructing stones.  No  hydronephrosis.  STOMACH AND BOWEL:  Unremarkable.  No obstruction.  No mucosal  thickening.    PELVIS:  APPENDIX:  No findings to suggest acute appendicitis.  BLADDER:  Unremarkable.  No stones.  REPRODUCTIVE:  Unremarkable as visualized.    ABDOMEN and PELVIS:  INTRAPERITONEAL SPACE:  Unremarkable.  No free air.  No significant  fluid collection.  BONES/JOINTS:  L2 vertebral body compression deformity again noted.  No dislocation.  SOFT TISSUES:  Unremarkable.  VASCULATURE:  Atherosclerotic disease.  No abdominal aortic aneurysm.  LYMPH NODES:  Unremarkable.  No enlarged lymph nodes.    Impression  No acute findings in the abdomen or pelvis.    This report was finalized on 10/3/2022 8:15 AM by Dr. Rigoberto Huddleston MD.       KUB: Results for orders placed during the hospital encounter of 10/01/22    XR Abdomen KUB    Narrative  KUB, 10/1/2022    HISTORY:  47-year-old female in the ED with flank pain, hematuria.    TECHNIQUE:  AP lower abdomen and pelvis.    COMPARISON:  *  CT abdomen/pelvis, 7/19/2022.    FINDINGS:  No visible radiopaque renal or urinary tract calculi on this examination, or on the prior CT stone study.    Visualized bowel gas pattern is normal. Moderate volume stool throughout normal caliber colon.    Surgical clips in the region of the lower rectum.    Impression  Negative KUB.    Signer Name: Sang Painting MD  Signed: 10/1/2022 3:55 PM  Workstation [facility]: [facility]  [facility]       Labs (past 3 months):      Admission on 10/03/2022, Discharged on 10/03/2022   Component Date Value Ref Range Status   • Color, UA 10/03/2022 Red (A)  Yellow, Straw Final    Dipstick results may be inaccurate due to color interference.    • Appearance, UA 10/03/2022 Turbid (A)  Clear Final   • pH, UA 10/03/2022 5.5  5.0 - 8.0 Final   • Specific Gravity, UA 10/03/2022 1.021  1.005 - 1.030 Final   • Glucose, UA 10/03/2022 Negative  Negative Final   • Ketones, UA 10/03/2022 Trace (A)  Negative Final   • Bilirubin, UA 10/03/2022 Small (1+) (A)  Negative Final   • Blood, UA 10/03/2022 Large (3+) (A)  Negative Final   • Protein, UA 10/03/2022 30 mg/dL (1+) (A)  Negative Final   • Leuk Esterase, UA 10/03/2022 Small (1+) (A)  Negative Final   • Nitrite, UA 10/03/2022 Negative  Negative Final   • Urobilinogen, UA 10/03/2022 1.0 E.U./dL  0.2 - 1.0 E.U./dL Final   • RBC, UA 10/03/2022 Too Numerous to Count (A)  None Seen, 0-2 /HPF Final   • WBC, UA 10/03/2022 3-5 (A)  None Seen, 0-2 /HPF Final    Urine culture not indicated.   • Bacteria, UA 10/03/2022 Trace (A)  None Seen /HPF Final   • Squamous Epithelial Cells, UA 10/03/2022 0-2  None Seen, 0-2 /HPF Final   • Hyaline Casts, UA 10/03/2022 None Seen  None Seen /LPF Final   • Methodology 10/03/2022 Manual Light Microscopy   Final   • Glucose 10/03/2022 110 (H)  65 - 99 mg/dL Final   • BUN 10/03/2022 9  6 - 20 mg/dL Final   • Creatinine 10/03/2022 0.58  0.57 - 1.00 mg/dL Final   • Sodium 10/03/2022 141  136 - 145 mmol/L Final   • Potassium 10/03/2022 3.7  3.5 - 5.2 mmol/L Final    Slight hemolysis detected by analyzer. Results may be affected.   • Chloride 10/03/2022 106  98 - 107 mmol/L Final   • CO2 10/03/2022 22.4  22.0 - 29.0 mmol/L Final   • Calcium 10/03/2022 9.5  8.6 - 10.5 mg/dL Final   • Total Protein 10/03/2022 7.0  6.0 - 8.5 g/dL Final   • Albumin 10/03/2022 4.58  3.50 - 5.20 g/dL Final   • ALT (SGPT) 10/03/2022 40 (H)  1 - 33 U/L Final   • AST  (SGOT) 10/03/2022 36 (H)  1 - 32 U/L Final   • Alkaline Phosphatase 10/03/2022 72  39 - 117 U/L Final   • Total Bilirubin 10/03/2022 1.5 (H)  0.0 - 1.2 mg/dL Final   • Globulin 10/03/2022 2.4  gm/dL Final   • A/G Ratio 10/03/2022 1.9  g/dL Final   • BUN/Creatinine Ratio 10/03/2022 15.5  7.0 - 25.0 Final   • Anion Gap 10/03/2022 12.6  5.0 - 15.0 mmol/L Final   • eGFR 10/03/2022 112.5  >60.0 mL/min/1.73 Final    National Kidney Foundation and American Society of Nephrology (ASN) Task Force recommended calculation based on the Chronic Kidney Disease Epidemiology Collaboration (CKD-EPI) equation refit without adjustment for race.   • WBC 10/03/2022 5.62  3.40 - 10.80 10*3/mm3 Final   • RBC 10/03/2022 4.81  3.77 - 5.28 10*6/mm3 Final   • Hemoglobin 10/03/2022 15.0  12.0 - 15.9 g/dL Final   • Hematocrit 10/03/2022 43.5  34.0 - 46.6 % Final   • MCV 10/03/2022 90.4  79.0 - 97.0 fL Final   • MCH 10/03/2022 31.2  26.6 - 33.0 pg Final   • MCHC 10/03/2022 34.5  31.5 - 35.7 g/dL Final   • RDW 10/03/2022 13.2  12.3 - 15.4 % Final   • RDW-SD 10/03/2022 43.3  37.0 - 54.0 fl Final   • MPV 10/03/2022 9.9  6.0 - 12.0 fL Final   • Platelets 10/03/2022 162  140 - 450 10*3/mm3 Final   • Neutrophil % 10/03/2022 66.5  42.7 - 76.0 % Final   • Lymphocyte % 10/03/2022 24.9  19.6 - 45.3 % Final   • Monocyte % 10/03/2022 6.8  5.0 - 12.0 % Final   • Eosinophil % 10/03/2022 1.4  0.3 - 6.2 % Final   • Basophil % 10/03/2022 0.2  0.0 - 1.5 % Final   • Immature Grans % 10/03/2022 0.2  0.0 - 0.5 % Final   • Neutrophils, Absolute 10/03/2022 3.74  1.70 - 7.00 10*3/mm3 Final   • Lymphocytes, Absolute 10/03/2022 1.40  0.70 - 3.10 10*3/mm3 Final   • Monocytes, Absolute 10/03/2022 0.38  0.10 - 0.90 10*3/mm3 Final   • Eosinophils, Absolute 10/03/2022 0.08  0.00 - 0.40 10*3/mm3 Final   • Basophils, Absolute 10/03/2022 0.01  0.00 - 0.20 10*3/mm3 Final   • Immature Grans, Absolute 10/03/2022 0.01  0.00 - 0.05 10*3/mm3 Final   • nRBC 10/03/2022 0.0  0.0 - 0.2  /100 WBC Final   Admission on 10/01/2022, Discharged on 10/01/2022   Component Date Value Ref Range Status   • Glucose 10/01/2022 88  65 - 99 mg/dL Final   • BUN 10/01/2022 6  6 - 20 mg/dL Final   • Creatinine 10/01/2022 0.57  0.57 - 1.00 mg/dL Final   • Sodium 10/01/2022 142  136 - 145 mmol/L Final   • Potassium 10/01/2022 4.3  3.5 - 5.2 mmol/L Final   • Chloride 10/01/2022 105  98 - 107 mmol/L Final   • CO2 10/01/2022 27.1  22.0 - 29.0 mmol/L Final   • Calcium 10/01/2022 9.4  8.6 - 10.5 mg/dL Final   • Total Protein 10/01/2022 6.9  6.0 - 8.5 g/dL Final   • Albumin 10/01/2022 4.42  3.50 - 5.20 g/dL Final   • ALT (SGPT) 10/01/2022 41 (H)  1 - 33 U/L Final   • AST (SGOT) 10/01/2022 34 (H)  1 - 32 U/L Final   • Alkaline Phosphatase 10/01/2022 70  39 - 117 U/L Final   • Total Bilirubin 10/01/2022 0.8  0.0 - 1.2 mg/dL Final   • Globulin 10/01/2022 2.5  gm/dL Final   • A/G Ratio 10/01/2022 1.8  g/dL Final   • BUN/Creatinine Ratio 10/01/2022 10.5  7.0 - 25.0 Final   • Anion Gap 10/01/2022 9.9  5.0 - 15.0 mmol/L Final   • eGFR 10/01/2022 113.0  >60.0 mL/min/1.73 Final    National Kidney Foundation and American Society of Nephrology (ASN) Task Force recommended calculation based on the Chronic Kidney Disease Epidemiology Collaboration (CKD-EPI) equation refit without adjustment for race.   • Color, UA 10/01/2022 Red (A)  Yellow, Straw Final    Dipstick results may be inaccurate due to color interference.       • Appearance, UA 10/01/2022 Turbid (A)  Clear Final   • pH, UA 10/01/2022 8.5 (H)  5.0 - 8.0 Final   • Specific Gravity, UA 10/01/2022 1.010  1.005 - 1.030 Final   • Glucose, UA 10/01/2022 Negative  Negative Final   • Ketones, UA 10/01/2022 Negative  Negative Final   • Bilirubin, UA 10/01/2022 Negative  Negative Final   • Blood, UA 10/01/2022 Large (3+) (A)  Negative Final   • Protein, UA 10/01/2022 30 mg/dL (1+) (A)  Negative Final   • Leuk Esterase, UA 10/01/2022 Trace (A)  Negative Final   • Nitrite, UA 10/01/2022  Negative  Negative Final   • Urobilinogen, UA 10/01/2022 1.0 E.U./dL  0.2 - 1.0 E.U./dL Final   • WBC 10/01/2022 5.16  3.40 - 10.80 10*3/mm3 Final   • RBC 10/01/2022 4.73  3.77 - 5.28 10*6/mm3 Final   • Hemoglobin 10/01/2022 14.6  12.0 - 15.9 g/dL Final   • Hematocrit 10/01/2022 43.1  34.0 - 46.6 % Final   • MCV 10/01/2022 91.1  79.0 - 97.0 fL Final   • MCH 10/01/2022 30.9  26.6 - 33.0 pg Final   • MCHC 10/01/2022 33.9  31.5 - 35.7 g/dL Final   • RDW 10/01/2022 13.1  12.3 - 15.4 % Final   • RDW-SD 10/01/2022 43.5  37.0 - 54.0 fl Final   • MPV 10/01/2022 10.1  6.0 - 12.0 fL Final   • Platelets 10/01/2022 163  140 - 450 10*3/mm3 Final   • Neutrophil % 10/01/2022 61.6  42.7 - 76.0 % Final   • Lymphocyte % 10/01/2022 30.6  19.6 - 45.3 % Final   • Monocyte % 10/01/2022 6.2  5.0 - 12.0 % Final   • Eosinophil % 10/01/2022 1.2  0.3 - 6.2 % Final   • Basophil % 10/01/2022 0.2  0.0 - 1.5 % Final   • Immature Grans % 10/01/2022 0.2  0.0 - 0.5 % Final   • Neutrophils, Absolute 10/01/2022 3.18  1.70 - 7.00 10*3/mm3 Final   • Lymphocytes, Absolute 10/01/2022 1.58  0.70 - 3.10 10*3/mm3 Final   • Monocytes, Absolute 10/01/2022 0.32  0.10 - 0.90 10*3/mm3 Final   • Eosinophils, Absolute 10/01/2022 0.06  0.00 - 0.40 10*3/mm3 Final   • Basophils, Absolute 10/01/2022 0.01  0.00 - 0.20 10*3/mm3 Final   • Immature Grans, Absolute 10/01/2022 0.01  0.00 - 0.05 10*3/mm3 Final   • nRBC 10/01/2022 0.0  0.0 - 0.2 /100 WBC Final   • Extra Tube 10/01/2022 Hold for add-ons.   Final    Auto resulted.   • Extra Tube 10/01/2022 hold for add-on   Final    Auto resulted   • Extra Tube 10/01/2022 Hold for add-ons.   Final    Auto resulted.   • Extra Tube 10/01/2022 Hold for add-ons.   Final    Auto resulted   • RBC, UA 10/01/2022 Too Numerous to Count (A)  None Seen, 0-2 /HPF Final   • WBC, UA 10/01/2022 6-12 (A)  None Seen, 0-2 /HPF Final   • Bacteria, UA 10/01/2022 None Seen  None Seen /HPF Final   • Squamous Epithelial Cells, UA 10/01/2022 3-6 (A)   None Seen, 0-2 /HPF Final   • Yeast, UA 10/01/2022 Small/1+ Yeast  None Seen /HPF Final   • Hyaline Casts, UA 10/01/2022 3-6  None Seen /LPF Final   • Methodology 10/01/2022 Automated Microscopy   Final   Admission on 09/10/2022, Discharged on 09/10/2022   Component Date Value Ref Range Status   • COVID19 09/09/2022 Not Detected  Not Detected - Ref. Range Final   • Influenza A PCR 09/09/2022 Not Detected  Not Detected Final   • Influenza B PCR 09/09/2022 Not Detected  Not Detected Final   Admission on 08/23/2022, Discharged on 08/24/2022   Component Date Value Ref Range Status   • Glucose 08/23/2022 89  65 - 99 mg/dL Final   • BUN 08/23/2022 11  6 - 20 mg/dL Final   • Creatinine 08/23/2022 0.60  0.57 - 1.00 mg/dL Final   • Sodium 08/23/2022 138  136 - 145 mmol/L Final   • Potassium 08/23/2022 3.6  3.5 - 5.2 mmol/L Final    Slight hemolysis detected by analyzer. Results may be affected.   • Chloride 08/23/2022 104  98 - 107 mmol/L Final   • CO2 08/23/2022 24.8  22.0 - 29.0 mmol/L Final   • Calcium 08/23/2022 9.4  8.6 - 10.5 mg/dL Final   • Total Protein 08/23/2022 6.8  6.0 - 8.5 g/dL Final   • Albumin 08/23/2022 4.53  3.50 - 5.20 g/dL Final   • ALT (SGPT) 08/23/2022 26  1 - 33 U/L Final   • AST (SGOT) 08/23/2022 27  1 - 32 U/L Final   • Alkaline Phosphatase 08/23/2022 76  39 - 117 U/L Final   • Total Bilirubin 08/23/2022 0.5  0.0 - 1.2 mg/dL Final   • Globulin 08/23/2022 2.3  gm/dL Final   • A/G Ratio 08/23/2022 2.0  g/dL Final   • BUN/Creatinine Ratio 08/23/2022 18.3  7.0 - 25.0 Final   • Anion Gap 08/23/2022 9.2  5.0 - 15.0 mmol/L Final   • eGFR 08/23/2022 111.6  >60.0 mL/min/1.73 Final    National Kidney Foundation and American Society of Nephrology (ASN) Task Force recommended calculation based on the Chronic Kidney Disease Epidemiology Collaboration (CKD-EPI) equation refit without adjustment for race.   • WBC 08/23/2022 6.41  3.40 - 10.80 10*3/mm3 Final   • RBC 08/23/2022 4.58  3.77 - 5.28 10*6/mm3 Final   •  Hemoglobin 08/23/2022 14.2  12.0 - 15.9 g/dL Final   • Hematocrit 08/23/2022 41.7  34.0 - 46.6 % Final   • MCV 08/23/2022 91.0  79.0 - 97.0 fL Final   • MCH 08/23/2022 31.0  26.6 - 33.0 pg Final   • MCHC 08/23/2022 34.1  31.5 - 35.7 g/dL Final   • RDW 08/23/2022 13.4  12.3 - 15.4 % Final   • RDW-SD 08/23/2022 44.8  37.0 - 54.0 fl Final   • MPV 08/23/2022 10.1  6.0 - 12.0 fL Final   • Platelets 08/23/2022 176  140 - 450 10*3/mm3 Final   • Neutrophil % 08/23/2022 47.0  42.7 - 76.0 % Final   • Lymphocyte % 08/23/2022 43.7  19.6 - 45.3 % Final   • Monocyte % 08/23/2022 6.9  5.0 - 12.0 % Final   • Eosinophil % 08/23/2022 1.9  0.3 - 6.2 % Final   • Basophil % 08/23/2022 0.3  0.0 - 1.5 % Final   • Immature Grans % 08/23/2022 0.2  0.0 - 0.5 % Final   • Neutrophils, Absolute 08/23/2022 3.02  1.70 - 7.00 10*3/mm3 Final   • Lymphocytes, Absolute 08/23/2022 2.80  0.70 - 3.10 10*3/mm3 Final   • Monocytes, Absolute 08/23/2022 0.44  0.10 - 0.90 10*3/mm3 Final   • Eosinophils, Absolute 08/23/2022 0.12  0.00 - 0.40 10*3/mm3 Final   • Basophils, Absolute 08/23/2022 0.02  0.00 - 0.20 10*3/mm3 Final   • Immature Grans, Absolute 08/23/2022 0.01  0.00 - 0.05 10*3/mm3 Final   • nRBC 08/23/2022 0.0  0.0 - 0.2 /100 WBC Final   • Extra Tube 08/23/2022 Hold for add-ons.   Final    Auto resulted.   • Extra Tube 08/23/2022 hold for add-on   Final    Auto resulted   • Extra Tube 08/23/2022 Hold for add-ons.   Final    Auto resulted.   • Extra Tube 08/23/2022 Hold for add-ons.   Final    Auto resulted   Admission on 08/12/2022, Discharged on 08/12/2022   Component Date Value Ref Range Status   • Color, UA 08/12/2022 Orange (A)  Yellow, Straw Final   • Appearance, UA 08/12/2022 Cloudy (A)  Clear Final   • pH, UA 08/12/2022 6.0  5.0 - 8.0 Final   • Specific Gravity, UA 08/12/2022 1.011  1.005 - 1.030 Final   • Glucose, UA 08/12/2022 Negative  Negative Final   • Ketones, UA 08/12/2022 Negative  Negative Final   • Bilirubin, UA 08/12/2022 Negative   Negative Final   • Blood, UA 08/12/2022 Large (3+) (A)  Negative Final   • Protein, UA 08/12/2022 Trace (A)  Negative Final   • Leuk Esterase, UA 08/12/2022 Trace (A)  Negative Final   • Nitrite, UA 08/12/2022 Negative  Negative Final   • Urobilinogen, UA 08/12/2022 1.0 E.U./dL  0.2 - 1.0 E.U./dL Final   • Extra Tube 08/12/2022 Hold for add-ons.   Final    Auto resulted.   • Extra Tube 08/12/2022 hold for add-on   Final    Auto resulted   • Extra Tube 08/12/2022 Hold for add-ons.   Final    Auto resulted.   • Extra Tube 08/12/2022 Hold for add-ons.   Final    Auto resulted   • RBC, UA 08/12/2022 Too Numerous to Count (A)  None Seen, 0-2 /HPF Final   • WBC, UA 08/12/2022 3-5 (A)  None Seen, 0-2 /HPF Final   • Bacteria, UA 08/12/2022 None Seen  None Seen /HPF Final   • Squamous Epithelial Cells, UA 08/12/2022 0-2  None Seen, 0-2 /HPF Final   • Hyaline Casts, UA 08/12/2022 None Seen  None Seen /LPF Final   • Methodology 08/12/2022 Automated Microscopy   Final   • Glucose 08/12/2022 98  65 - 99 mg/dL Final   • BUN 08/12/2022 7  6 - 20 mg/dL Final   • Creatinine 08/12/2022 0.53 (L)  0.57 - 1.00 mg/dL Final   • Sodium 08/12/2022 136  136 - 145 mmol/L Final   • Potassium 08/12/2022 3.6  3.5 - 5.2 mmol/L Final    Slight hemolysis detected by analyzer. Results may be affected.   • Chloride 08/12/2022 102  98 - 107 mmol/L Final   • CO2 08/12/2022 23.4  22.0 - 29.0 mmol/L Final   • Calcium 08/12/2022 9.3  8.6 - 10.5 mg/dL Final   • Total Protein 08/12/2022 6.7  6.0 - 8.5 g/dL Final   • Albumin 08/12/2022 4.44  3.50 - 5.20 g/dL Final   • ALT (SGPT) 08/12/2022 33  1 - 33 U/L Final   • AST (SGOT) 08/12/2022 34 (H)  1 - 32 U/L Final   • Alkaline Phosphatase 08/12/2022 71  39 - 117 U/L Final   • Total Bilirubin 08/12/2022 0.8  0.0 - 1.2 mg/dL Final   • Globulin 08/12/2022 2.3  gm/dL Final   • A/G Ratio 08/12/2022 2.0  g/dL Final   • BUN/Creatinine Ratio 08/12/2022 13.2  7.0 - 25.0 Final   • Anion Gap 08/12/2022 10.6  5.0 - 15.0  mmol/L Final   • eGFR 08/12/2022 115.0  >60.0 mL/min/1.73 Final    National Kidney Foundation and American Society of Nephrology (ASN) Task Force recommended calculation based on the Chronic Kidney Disease Epidemiology Collaboration (CKD-EPI) equation refit without adjustment for race.   • C-Reactive Protein 08/12/2022 <0.30  0.00 - 0.50 mg/dL Final   • WBC 08/12/2022 6.37  3.40 - 10.80 10*3/mm3 Final   • RBC 08/12/2022 4.70  3.77 - 5.28 10*6/mm3 Final   • Hemoglobin 08/12/2022 14.3  12.0 - 15.9 g/dL Final   • Hematocrit 08/12/2022 43.0  34.0 - 46.6 % Final   • MCV 08/12/2022 91.5  79.0 - 97.0 fL Final   • MCH 08/12/2022 30.4  26.6 - 33.0 pg Final   • MCHC 08/12/2022 33.3  31.5 - 35.7 g/dL Final   • RDW 08/12/2022 13.1  12.3 - 15.4 % Final   • RDW-SD 08/12/2022 43.9  37.0 - 54.0 fl Final   • MPV 08/12/2022 10.0  6.0 - 12.0 fL Final   • Platelets 08/12/2022 160  140 - 450 10*3/mm3 Final   • Neutrophil % 08/12/2022 69.5  42.7 - 76.0 % Final   • Lymphocyte % 08/12/2022 23.9  19.6 - 45.3 % Final   • Monocyte % 08/12/2022 5.2  5.0 - 12.0 % Final   • Eosinophil % 08/12/2022 1.1  0.3 - 6.2 % Final   • Basophil % 08/12/2022 0.0  0.0 - 1.5 % Final   • Immature Grans % 08/12/2022 0.3  0.0 - 0.5 % Final   • Neutrophils, Absolute 08/12/2022 4.43  1.70 - 7.00 10*3/mm3 Final   • Lymphocytes, Absolute 08/12/2022 1.52  0.70 - 3.10 10*3/mm3 Final   • Monocytes, Absolute 08/12/2022 0.33  0.10 - 0.90 10*3/mm3 Final   • Eosinophils, Absolute 08/12/2022 0.07  0.00 - 0.40 10*3/mm3 Final   • Basophils, Absolute 08/12/2022 0.00  0.00 - 0.20 10*3/mm3 Final   • Immature Grans, Absolute 08/12/2022 0.02  0.00 - 0.05 10*3/mm3 Final   • nRBC 08/12/2022 0.0  0.0 - 0.2 /100 WBC Final   Admission on 07/30/2022, Discharged on 07/30/2022   Component Date Value Ref Range Status   • Glucose 07/30/2022 101 (H)  65 - 99 mg/dL Final   • BUN 07/30/2022 6  6 - 20 mg/dL Final   • Creatinine 07/30/2022 0.56 (L)  0.57 - 1.00 mg/dL Final   • Sodium 07/30/2022  143  136 - 145 mmol/L Final   • Potassium 07/30/2022 4.4  3.5 - 5.2 mmol/L Final   • Chloride 07/30/2022 105  98 - 107 mmol/L Final   • CO2 07/30/2022 28.8  22.0 - 29.0 mmol/L Final   • Calcium 07/30/2022 9.7  8.6 - 10.5 mg/dL Final   • Total Protein 07/30/2022 6.7  6.0 - 8.5 g/dL Final   • Albumin 07/30/2022 4.47  3.50 - 5.20 g/dL Final   • ALT (SGPT) 07/30/2022 39 (H)  1 - 33 U/L Final   • AST (SGOT) 07/30/2022 37 (H)  1 - 32 U/L Final   • Alkaline Phosphatase 07/30/2022 74  39 - 117 U/L Final   • Total Bilirubin 07/30/2022 0.5  0.0 - 1.2 mg/dL Final   • Globulin 07/30/2022 2.2  gm/dL Final   • A/G Ratio 07/30/2022 2.0  g/dL Final   • BUN/Creatinine Ratio 07/30/2022 10.7  7.0 - 25.0 Final   • Anion Gap 07/30/2022 9.2  5.0 - 15.0 mmol/L Final   • eGFR 07/30/2022 113.4  >60.0 mL/min/1.73 Final    National Kidney Foundation and American Society of Nephrology (ASN) Task Force recommended calculation based on the Chronic Kidney Disease Epidemiology Collaboration (CKD-EPI) equation refit without adjustment for race.   • Troponin T 07/30/2022 <0.010  0.000 - 0.030 ng/mL Final   • Troponin T 07/30/2022 <0.010  0.000 - 0.030 ng/mL Final   • TSH 07/30/2022 0.711  0.270 - 4.200 uIU/mL Final   • Magnesium 07/30/2022 1.9  1.6 - 2.6 mg/dL Final   • Extra Tube 07/30/2022 Hold for add-ons.   Final    Auto resulted.   • Extra Tube 07/30/2022 hold for add-on   Final    Auto resulted   • Extra Tube 07/30/2022 Hold for add-ons.   Final    Auto resulted.   • Extra Tube 07/30/2022 Hold for add-ons.   Final    Auto resulted   • WBC 07/30/2022 4.80  3.40 - 10.80 10*3/mm3 Final   • RBC 07/30/2022 4.85  3.77 - 5.28 10*6/mm3 Final   • Hemoglobin 07/30/2022 14.8  12.0 - 15.9 g/dL Final   • Hematocrit 07/30/2022 45.0  34.0 - 46.6 % Final   • MCV 07/30/2022 92.8  79.0 - 97.0 fL Final   • MCH 07/30/2022 30.5  26.6 - 33.0 pg Final   • MCHC 07/30/2022 32.9  31.5 - 35.7 g/dL Final   • RDW 07/30/2022 13.1  12.3 - 15.4 % Final   • RDW-SD 07/30/2022  44.7  37.0 - 54.0 fl Final   • MPV 07/30/2022 10.7  6.0 - 12.0 fL Final   • Platelets 07/30/2022 137 (L)  140 - 450 10*3/mm3 Final   • Neutrophil % 07/30/2022 60.0  42.7 - 76.0 % Final   • Lymphocyte % 07/30/2022 31.7  19.6 - 45.3 % Final   • Monocyte % 07/30/2022 5.8  5.0 - 12.0 % Final   • Eosinophil % 07/30/2022 2.1  0.3 - 6.2 % Final   • Basophil % 07/30/2022 0.2  0.0 - 1.5 % Final   • Immature Grans % 07/30/2022 0.2  0.0 - 0.5 % Final   • Neutrophils, Absolute 07/30/2022 2.88  1.70 - 7.00 10*3/mm3 Final   • Lymphocytes, Absolute 07/30/2022 1.52  0.70 - 3.10 10*3/mm3 Final   • Monocytes, Absolute 07/30/2022 0.28  0.10 - 0.90 10*3/mm3 Final   • Eosinophils, Absolute 07/30/2022 0.10  0.00 - 0.40 10*3/mm3 Final   • Basophils, Absolute 07/30/2022 0.01  0.00 - 0.20 10*3/mm3 Final   • Immature Grans, Absolute 07/30/2022 0.01  0.00 - 0.05 10*3/mm3 Final   • nRBC 07/30/2022 0.0  0.0 - 0.2 /100 WBC Final   • D-Dimer, Quantitative 07/30/2022 3.57 (C)  0.00 - 0.50 MCGFEU/mL Final   • THC, Screen, Urine 07/30/2022 Positive (A)  Negative Final   • Phencyclidine (PCP), Urine 07/30/2022 Negative  Negative Final   • Cocaine Screen, Urine 07/30/2022 Negative  Negative Final   • Methamphetamine, Ur 07/30/2022 Negative  Negative Final   • Opiate Screen 07/30/2022 Negative  Negative Final   • Amphetamine Screen, Urine 07/30/2022 Negative  Negative Final   • Benzodiazepine Screen, Urine 07/30/2022 Negative  Negative Final   • Tricyclic Antidepressants Screen 07/30/2022 Negative  Negative Final   • Methadone Screen, Urine 07/30/2022 Negative  Negative Final   • Barbiturates Screen, Urine 07/30/2022 Negative  Negative Final   • Oxycodone Screen, Urine 07/30/2022 Positive (A)  Negative Final   • Propoxyphene Screen 07/30/2022 Negative  Negative Final   • Buprenorphine, Screen, Urine 07/30/2022 Negative  Negative Final   • QT Interval 07/30/2022 400  ms Corrected   • QTC Interval 07/30/2022 428  ms Corrected   Admission on 07/22/2022,  Discharged on 07/22/2022   Component Date Value Ref Range Status   • QT Interval 07/22/2022 378  ms Final   • QTC Interval 07/22/2022 452  ms Final   • Glucose 07/22/2022 109 (H)  65 - 99 mg/dL Final   • BUN 07/22/2022 4 (L)  6 - 20 mg/dL Final   • Creatinine 07/22/2022 0.59  0.57 - 1.00 mg/dL Final   • Sodium 07/22/2022 141  136 - 145 mmol/L Final   • Potassium 07/22/2022 3.3 (L)  3.5 - 5.2 mmol/L Final   • Chloride 07/22/2022 105  98 - 107 mmol/L Final   • CO2 07/22/2022 21.9 (L)  22.0 - 29.0 mmol/L Final   • Calcium 07/22/2022 9.2  8.6 - 10.5 mg/dL Final   • Total Protein 07/22/2022 6.2  6.0 - 8.5 g/dL Final   • Albumin 07/22/2022 4.09  3.50 - 5.20 g/dL Final   • ALT (SGPT) 07/22/2022 32  1 - 33 U/L Final   • AST (SGOT) 07/22/2022 30  1 - 32 U/L Final   • Alkaline Phosphatase 07/22/2022 63  39 - 117 U/L Final   • Total Bilirubin 07/22/2022 0.5  0.0 - 1.2 mg/dL Final   • Globulin 07/22/2022 2.1  gm/dL Final   • A/G Ratio 07/22/2022 1.9  g/dL Final   • BUN/Creatinine Ratio 07/22/2022 6.8 (L)  7.0 - 25.0 Final   • Anion Gap 07/22/2022 14.1  5.0 - 15.0 mmol/L Final   • eGFR 07/22/2022 112.0  >60.0 mL/min/1.73 Final    National Kidney Foundation and American Society of Nephrology (ASN) Task Force recommended calculation based on the Chronic Kidney Disease Epidemiology Collaboration (CKD-EPI) equation refit without adjustment for race.   • proBNP 07/22/2022 143.5  0.0 - 450.0 pg/mL Final   • Troponin T 07/22/2022 <0.010  0.000 - 0.030 ng/mL Final   • D-Dimer, Quantitative 07/22/2022 3.50 (C)  0.00 - 0.50 MCGFEU/mL Final   • C-Reactive Protein 07/22/2022 <0.30  0.00 - 0.50 mg/dL Final   • Lactate 07/22/2022 1.6  0.5 - 2.0 mmol/L Final   • Procalcitonin 07/22/2022 0.04  0.00 - 0.25 ng/mL Final   • THC, Screen, Urine 07/22/2022 Positive (A)  Negative Final   • Phencyclidine (PCP), Urine 07/22/2022 Negative  Negative Final   • Cocaine Screen, Urine 07/22/2022 Negative  Negative Final   • Methamphetamine, Ur 07/22/2022  Positive (A)  Negative Final   • Opiate Screen 07/22/2022 Negative  Negative Final   • Amphetamine Screen, Urine 07/22/2022 Positive (A)  Negative Final   • Benzodiazepine Screen, Urine 07/22/2022 Negative  Negative Final   • Tricyclic Antidepressants Screen 07/22/2022 Negative  Negative Final   • Methadone Screen, Urine 07/22/2022 Negative  Negative Final   • Barbiturates Screen, Urine 07/22/2022 Negative  Negative Final   • Oxycodone Screen, Urine 07/22/2022 Negative  Negative Final   • Propoxyphene Screen 07/22/2022 Negative  Negative Final   • Buprenorphine, Screen, Urine 07/22/2022 Negative  Negative Final   • COVID19 07/22/2022 Not Detected  Not Detected - Ref. Range Final   • Influenza A PCR 07/22/2022 Not Detected  Not Detected Final   • Influenza B PCR 07/22/2022 Not Detected  Not Detected Final   • Color, UA 07/22/2022 Yellow  Yellow, Straw Final   • Appearance, UA 07/22/2022 Clear  Clear Final   • pH, UA 07/22/2022 6.5  5.0 - 8.0 Final   • Specific Gravity, UA 07/22/2022 1.013  1.005 - 1.030 Final   • Glucose, UA 07/22/2022 Negative  Negative Final   • Ketones, UA 07/22/2022 Negative  Negative Final   • Bilirubin, UA 07/22/2022 Negative  Negative Final   • Blood, UA 07/22/2022 Negative  Negative Final   • Protein, UA 07/22/2022 Negative  Negative Final   • Leuk Esterase, UA 07/22/2022 Negative  Negative Final   • Nitrite, UA 07/22/2022 Negative  Negative Final   • Urobilinogen, UA 07/22/2022 1.0 E.U./dL  0.2 - 1.0 E.U./dL Final   • Extra Tube 07/22/2022 Hold for add-ons.   Final    Auto resulted.   • Extra Tube 07/22/2022 hold for add-on   Final    Auto resulted   • Extra Tube 07/22/2022 Hold for add-ons.   Final    Auto resulted.   • Extra Tube 07/22/2022 Hold for add-ons.   Final    Auto resulted   • WBC 07/22/2022 4.55  3.40 - 10.80 10*3/mm3 Final   • RBC 07/22/2022 4.73  3.77 - 5.28 10*6/mm3 Final   • Hemoglobin 07/22/2022 14.8  12.0 - 15.9 g/dL Final   • Hematocrit 07/22/2022 44.4  34.0 - 46.6 %  Final   • MCV 07/22/2022 93.9  79.0 - 97.0 fL Final   • MCH 07/22/2022 31.3  26.6 - 33.0 pg Final   • MCHC 07/22/2022 33.3  31.5 - 35.7 g/dL Final   • RDW 07/22/2022 12.8  12.3 - 15.4 % Final   • RDW-SD 07/22/2022 44.2  37.0 - 54.0 fl Final   • MPV 07/22/2022 10.3  6.0 - 12.0 fL Final   • Platelets 07/22/2022 141  140 - 450 10*3/mm3 Final   • Neutrophil % 07/22/2022 52.9  42.7 - 76.0 % Final   • Lymphocyte % 07/22/2022 36.7  19.6 - 45.3 % Final   • Monocyte % 07/22/2022 7.3  5.0 - 12.0 % Final   • Eosinophil % 07/22/2022 2.9  0.3 - 6.2 % Final   • Basophil % 07/22/2022 0.2  0.0 - 1.5 % Final   • Immature Grans % 07/22/2022 0.0  0.0 - 0.5 % Final   • Neutrophils, Absolute 07/22/2022 2.41  1.70 - 7.00 10*3/mm3 Final   • Lymphocytes, Absolute 07/22/2022 1.67  0.70 - 3.10 10*3/mm3 Final   • Monocytes, Absolute 07/22/2022 0.33  0.10 - 0.90 10*3/mm3 Final   • Eosinophils, Absolute 07/22/2022 0.13  0.00 - 0.40 10*3/mm3 Final   • Basophils, Absolute 07/22/2022 0.01  0.00 - 0.20 10*3/mm3 Final   • Immature Grans, Absolute 07/22/2022 0.00  0.00 - 0.05 10*3/mm3 Final   • nRBC 07/22/2022 0.0  0.0 - 0.2 /100 WBC Final   Admission on 07/12/2022, Discharged on 07/12/2022   Component Date Value Ref Range Status   • Glucose 07/12/2022 80  65 - 99 mg/dL Final   • BUN 07/12/2022 5 (L)  6 - 20 mg/dL Final   • Creatinine 07/12/2022 0.60  0.57 - 1.00 mg/dL Final   • Sodium 07/12/2022 141  136 - 145 mmol/L Final   • Potassium 07/12/2022 4.0  3.5 - 5.2 mmol/L Final   • Chloride 07/12/2022 107  98 - 107 mmol/L Final   • CO2 07/12/2022 24.4  22.0 - 29.0 mmol/L Final   • Calcium 07/12/2022 9.2  8.6 - 10.5 mg/dL Final   • Total Protein 07/12/2022 6.5  6.0 - 8.5 g/dL Final   • Albumin 07/12/2022 4.35  3.50 - 5.20 g/dL Final   • ALT (SGPT) 07/12/2022 34 (H)  1 - 33 U/L Final   • AST (SGOT) 07/12/2022 28  1 - 32 U/L Final   • Alkaline Phosphatase 07/12/2022 60  39 - 117 U/L Final   • Total Bilirubin 07/12/2022 1.2  0.0 - 1.2 mg/dL Final   •  Globulin 07/12/2022 2.2  gm/dL Final   • A/G Ratio 07/12/2022 2.0  g/dL Final   • BUN/Creatinine Ratio 07/12/2022 8.3  7.0 - 25.0 Final   • Anion Gap 07/12/2022 9.6  5.0 - 15.0 mmol/L Final   • eGFR 07/12/2022 111.6  >60.0 mL/min/1.73 Final    National Kidney Foundation and American Society of Nephrology (ASN) Task Force recommended calculation based on the Chronic Kidney Disease Epidemiology Collaboration (CKD-EPI) equation refit without adjustment for race.   • C-Reactive Protein 07/12/2022 <0.30  0.00 - 0.50 mg/dL Final   • Sed Rate 07/12/2022 3  0 - 20 mm/hr Final   • THC, Screen, Urine 07/12/2022 Positive (A)  Negative Final   • Phencyclidine (PCP), Urine 07/12/2022 Negative  Negative Final   • Cocaine Screen, Urine 07/12/2022 Negative  Negative Final   • Methamphetamine, Ur 07/12/2022 Positive (A)  Negative Final   • Opiate Screen 07/12/2022 Positive (A)  Negative Final   • Amphetamine Screen, Urine 07/12/2022 Positive (A)  Negative Final   • Benzodiazepine Screen, Urine 07/12/2022 Negative  Negative Final   • Tricyclic Antidepressants Screen 07/12/2022 Negative  Negative Final   • Methadone Screen, Urine 07/12/2022 Negative  Negative Final   • Barbiturates Screen, Urine 07/12/2022 Negative  Negative Final   • Oxycodone Screen, Urine 07/12/2022 Negative  Negative Final   • Propoxyphene Screen 07/12/2022 Negative  Negative Final   • Buprenorphine, Screen, Urine 07/12/2022 Negative  Negative Final   • Color, UA 07/12/2022 Dark Yellow (A)  Yellow, Straw Final   • Appearance, UA 07/12/2022 Clear  Clear Final   • pH, UA 07/12/2022 6.5  5.0 - 8.0 Final   • Specific Gravity, UA 07/12/2022 <=1.005  1.005 - 1.030 Final   • Glucose, UA 07/12/2022 Negative  Negative Final   • Ketones, UA 07/12/2022 Negative  Negative Final   • Bilirubin, UA 07/12/2022 Negative  Negative Final   • Blood, UA 07/12/2022 Negative  Negative Final   • Protein, UA 07/12/2022 Negative  Negative Final   • Leuk Esterase, UA 07/12/2022 Negative   Negative Final   • Nitrite, UA 07/12/2022 Negative  Negative Final   • Urobilinogen, UA 07/12/2022 1.0 E.U./dL  0.2 - 1.0 E.U./dL Final   • WBC 07/12/2022 5.03  3.40 - 10.80 10*3/mm3 Final   • RBC 07/12/2022 4.64  3.77 - 5.28 10*6/mm3 Final   • Hemoglobin 07/12/2022 14.4  12.0 - 15.9 g/dL Final   • Hematocrit 07/12/2022 41.9  34.0 - 46.6 % Final   • MCV 07/12/2022 90.3  79.0 - 97.0 fL Final   • MCH 07/12/2022 31.0  26.6 - 33.0 pg Final   • MCHC 07/12/2022 34.4  31.5 - 35.7 g/dL Final   • RDW 07/12/2022 12.7  12.3 - 15.4 % Final   • RDW-SD 07/12/2022 41.7  37.0 - 54.0 fl Final   • MPV 07/12/2022 10.8  6.0 - 12.0 fL Final   • Platelets 07/12/2022 145  140 - 450 10*3/mm3 Final   • Neutrophil % 07/12/2022 54.6  42.7 - 76.0 % Final   • Lymphocyte % 07/12/2022 35.0  19.6 - 45.3 % Final   • Monocyte % 07/12/2022 8.2  5.0 - 12.0 % Final   • Eosinophil % 07/12/2022 1.8  0.3 - 6.2 % Final   • Basophil % 07/12/2022 0.4  0.0 - 1.5 % Final   • Immature Grans % 07/12/2022 0.0  0.0 - 0.5 % Final   • Neutrophils, Absolute 07/12/2022 2.75  1.70 - 7.00 10*3/mm3 Final   • Lymphocytes, Absolute 07/12/2022 1.76  0.70 - 3.10 10*3/mm3 Final   • Monocytes, Absolute 07/12/2022 0.41  0.10 - 0.90 10*3/mm3 Final   • Eosinophils, Absolute 07/12/2022 0.09  0.00 - 0.40 10*3/mm3 Final   • Basophils, Absolute 07/12/2022 0.02  0.00 - 0.20 10*3/mm3 Final   • Immature Grans, Absolute 07/12/2022 0.00  0.00 - 0.05 10*3/mm3 Final   • nRBC 07/12/2022 0.0  0.0 - 0.2 /100 WBC Final        Procedure:   Urethral dilation-after an appropriate informed consent, the patient was brought to the procedure suite.  The urethra was gently anesthetized with 10 mL of 2% viscous Xylocaine jelly.  After an adequate period of topical anesthesia I went ahead and and the urethra was gently anesthetized and dilated with Bailey sounds from 16 to 24 [place] sequentially without complication. The patient was given gentamicin as prophylaxis with 80 mg.    Assessment/Plan:    Posttraumatic urethral stricture status post dilation  Narcotic pain medication-patient has significant acute pain that I believe would be an indication for the use of narcotic pain medication.  I discussed the significant risks of pain medication and the fact that this will be a short only option and I will give her no more than a three-day supply of pain medication, I will not plan long-term medication, and that this will be sent to a pain clinic if it at all becomes necessary.  We discussed signing a pain medication agreement and the fact that we're going to run a state [place] review to be sure the patient is not getting pain medication from elsewhere.  If this is the case, we will not give pain medication as part of the patient's treatment plan of there being prescribed a controlled substance with potential for abuse.  This patient has been well aware of the appropriate dose of such medications including the risks for somnolence, limited ability to drive and/or safety and the significant potential for overdose.  It has been made clear that these medications are for the prescribed patient only without concomitant use of alcohol or other substance unless prescribed by the medical provider.  Has completed prescribing agreement detailing the terms of continue prescribing him a controlled substance including monitoring [place] [facility] reports, the possibility of urine drug screens, and pill counts.  The patient is aware that we review [facility]ERT[facility] reports on a regular basis and scan them into the chart.  History and physical examination exhibited continued safe and appropriate use of controlled substances. We also discussed the fact that the new [place] legislation allows only a three-day prescription for pain medication.  In this situation he will be referred to a chronic pain clinic.                This document has been electronically signed by VERENICE FINK MD October 11, 2022 08:04 EDT    Dictated Utilizing Dragon  Dictation: Part of this note may be an electronic transcription/translation of spoken language to printed text using the Dragon Dictation System.

## 2022-10-16 ENCOUNTER — HOSPITAL ENCOUNTER (EMERGENCY)
Facility: HOSPITAL | Age: 47
Discharge: HOME | End: 2022-10-16
Attending: STUDENT IN AN ORGANIZED HEALTH CARE EDUCATION/TRAINING PROGRAM | Admitting: STUDENT IN AN ORGANIZED HEALTH CARE EDUCATION/TRAINING PROGRAM

## 2022-10-16 VITALS
SYSTOLIC BLOOD PRESSURE: 144 MMHG | HEIGHT: 68 IN | BODY MASS INDEX: 18.64 KG/M2 | OXYGEN SATURATION: 97 % | RESPIRATION RATE: 20 BRPM | HEART RATE: 91 BPM | DIASTOLIC BLOOD PRESSURE: 79 MMHG | WEIGHT: 123 LBS | TEMPERATURE: 98.9 F

## 2022-10-16 DIAGNOSIS — G43.909 MIGRAINE WITHOUT STATUS MIGRAINOSUS, NOT INTRACTABLE, UNSPECIFIED MIGRAINE TYPE: Primary | ICD-10-CM

## 2022-10-16 PROCEDURE — 25010000002 BUTORPHANOL PER 1 MG: Performed by: STUDENT IN AN ORGANIZED HEALTH CARE EDUCATION/TRAINING PROGRAM

## 2022-10-16 PROCEDURE — 25010000002 DIPHENHYDRAMINE PER 50 MG: Performed by: STUDENT IN AN ORGANIZED HEALTH CARE EDUCATION/TRAINING PROGRAM

## 2022-10-16 PROCEDURE — 25010000002 DEXAMETHASONE SODIUM PHOSPHATE 10 MG/ML SOLUTION: Performed by: STUDENT IN AN ORGANIZED HEALTH CARE EDUCATION/TRAINING PROGRAM

## 2022-10-16 PROCEDURE — 99283 EMERGENCY DEPT VISIT LOW MDM: CPT

## 2022-10-16 PROCEDURE — 96375 TX/PRO/DX INJ NEW DRUG ADDON: CPT

## 2022-10-16 PROCEDURE — 96374 THER/PROPH/DIAG INJ IV PUSH: CPT

## 2022-10-16 RX ORDER — DEXAMETHASONE SODIUM PHOSPHATE 10 MG/ML
10 INJECTION, SOLUTION INTRAMUSCULAR; INTRAVENOUS ONCE
Status: COMPLETED | OUTPATIENT
Start: 2022-10-16 | End: 2022-10-16

## 2022-10-16 RX ORDER — DIPHENHYDRAMINE HYDROCHLORIDE 50 MG/ML
25 INJECTION INTRAMUSCULAR; INTRAVENOUS ONCE
Status: COMPLETED | OUTPATIENT
Start: 2022-10-16 | End: 2022-10-16

## 2022-10-16 RX ADMIN — SODIUM CHLORIDE 1000 ML: 9 INJECTION, SOLUTION INTRAVENOUS at 16:51

## 2022-10-16 RX ADMIN — BUTORPHANOL TARTRATE 2 MG: 2 INJECTION, SOLUTION INTRAMUSCULAR; INTRAVENOUS at 16:39

## 2022-10-16 RX ADMIN — DIPHENHYDRAMINE HYDROCHLORIDE 25 MG: 50 INJECTION, SOLUTION INTRAMUSCULAR; INTRAVENOUS at 16:40

## 2022-10-16 RX ADMIN — DEXAMETHASONE SODIUM PHOSPHATE 10 MG: 10 INJECTION INTRAMUSCULAR; INTRAVENOUS at 16:39

## 2022-10-16 NOTE — ED PROVIDER NOTES
Subjective   History of Present Illness  47 year old female with past medical hx of chronic migraines, ley's disease, bipolar disorder, brain tumor, DDD, fibromyalgia, IBS, and PTSD presents to the ED with migraine x 5 days. She does state that her left eye is a little blurry, but this is not uncommon with her migraines. She also reports nausea and vomiting. Denies fever, chills, neck pain, neck stiffness, sick contacts, or travel. Aggrvating factors include head movement. Denies any alleviating factors. Denies any other complaints or concerns at this time.    History provided by:  Patient      Review of Systems   Constitutional: Negative.  Negative for fever.   Respiratory: Negative.    Cardiovascular: Negative.  Negative for chest pain.   Gastrointestinal: Positive for nausea and vomiting. Negative for abdominal pain.   Endocrine: Negative.    Genitourinary: Negative.  Negative for dysuria.   Musculoskeletal: Negative for neck pain and neck stiffness.   Skin: Negative.    Neurological: Positive for headaches.   Psychiatric/Behavioral: Negative.    All other systems reviewed and are negative.      Past Medical History:   Diagnosis Date   • Abdominal pain    • Abdominal swelling    • Ley's disease    • Bipolar 1 disorder (HCC)    • Brain tumor (benign) (HCC)    • Brain tumor (HCC)     R Frontal Lobe per pt   • Brain tumor (HCC) 2014   • Constipation    • DDD (degenerative disc disease), cervical 05/29/2017   • DDD (degenerative disc disease), cervical    • Diarrhea    • Fibromyalgia    • IBS (irritable bowel syndrome)    • Migraine    • Nausea & vomiting    • PONV (postoperative nausea and vomiting)    • PTSD (post-traumatic stress disorder)    • Rectal bleeding        Allergies   Allergen Reactions   • Ativan [Lorazepam] Hallucinations     confusion   • Sulfa Antibiotics Shortness Of Breath and Swelling   • Sulfa Antibiotics Anaphylaxis   • Reglan [Metoclopramide] Angioedema   • Compazine [Prochlorperazine  Edisylate] Hives   • Demerol [Meperidine] Hives   • Droperidol Itching   • Metoclopramide Swelling   • Toradol [Ketorolac Tromethamine] Hives and Itching   • Toradol [Ketorolac Tromethamine] Hives   • Zofran [Ondansetron Hcl] Rash   • Zosyn [Piperacillin Sod-Tazobactam So] Hives       Past Surgical History:   Procedure Laterality Date   • ANAL SCOPE N/A 7/28/2016    Procedure: ANAL SCOPE;  Surgeon: Kael Lopez MD;  Location: [facility] OR;  Service:    • APPENDECTOMY     • COLONOSCOPY N/A 6/30/2016    Procedure: COLONOSCOPY  CPTCODE:28426;  Surgeon: Jose Antonio Belle III, MD;  Location: [facility] OR;  Service:    • COLONOSCOPY N/A 7/7/2016    Procedure: COLONOSCOPY (67558) CPT;  Surgeon: Jose Antonio Belle III, MD;  Location: [facility] OR;  Service:    • CYSTOSCOPY RETROGRADE PYELOGRAM Bilateral 4/28/2017    Procedure: CYSTOSCOPY RETROGRADE PYELOGRAM;  Surgeon: Mu Blunt MD;  Location: [facility] OR;  Service:    • ENDOSCOPY N/A 6/30/2016    Procedure: ESOPHAGOGASTRODUODENOSCOPY WITH BIOPSY  CPTCODE:90486;  Surgeon: Jose Antonio Belle III, MD;  Location: [facility] OR;  Service:    • HEMORRHOIDECTOMY N/A 7/28/2016    Procedure: HEMORRHOID STAPLING;  Surgeon: Kael Lopez MD;  Location: [facility] OR;  Service:    • HYSTERECTOMY     • KNEE SURGERY     • LAPAROSCOPIC SALPINGOOPHERECTOMY     • PORTACATH PLACEMENT N/A 7/28/2017    Procedure: INSERTION OF PORTACATH;  Surgeon: Celso Arredondo MD;  Location: [facility] OR;  Service:    • SHOULDER SURGERY      3 times       Family History   Problem Relation Age of Onset   • Crohn's disease Other    • Hypertension Other    • Diabetes Other    • Irritable bowel syndrome Other    • No Known Problems Father    • No Known Problems Mother        Social History     Socioeconomic History   • Marital status:    Tobacco Use   • Smoking status: Former     Packs/day: 1.00     Years: 20.00     Pack years: 20.00     Types: Cigarettes     Quit date: 11/1/2015     Years since  quittin.9   • Smokeless tobacco: Never   Vaping Use   • Vaping Use: Never used   Substance and Sexual Activity   • Alcohol use: No   • Drug use: Yes     Types: Marijuana     Comment: for pain   • Sexual activity: Defer     Birth control/protection: Surgical           Objective   Physical Exam  Vitals and nursing note reviewed.   Constitutional:       General: She is not in acute distress.     Appearance: She is well-developed. She is not diaphoretic.   HENT:      Head: Normocephalic and atraumatic.      Right Ear: External ear normal.      Left Ear: External ear normal.      Nose: Nose normal.   Eyes:      Conjunctiva/sclera: Conjunctivae normal.      Pupils: Pupils are equal, round, and reactive to light.   Neck:      Vascular: No JVD.      Trachea: No tracheal deviation.   Cardiovascular:      Rate and Rhythm: Normal rate and regular rhythm.      Heart sounds: Normal heart sounds. No murmur heard.  Pulmonary:      Effort: Pulmonary effort is normal. No respiratory distress.      Breath sounds: Normal breath sounds. No wheezing.   Abdominal:      General: Bowel sounds are normal.      Palpations: Abdomen is soft.      Tenderness: There is no abdominal tenderness.   Musculoskeletal:         General: No deformity. Normal range of motion.      Cervical back: Normal range of motion and neck supple.   Skin:     General: Skin is warm and dry.      Coloration: Skin is not pale.      Findings: No erythema or rash.   Neurological:      Mental Status: She is alert and oriented to person, place, and time.      Cranial Nerves: No cranial nerve deficit.   Psychiatric:         Behavior: Behavior normal.         Thought Content: Thought content normal.         Procedures           ED Course                                           MDM  Number of Diagnoses or Management Options  Migraine without status migrainosus, not intractable, unspecified migraine type: new and does not require workup  Risk of Complications, Morbidity,  and/or Mortality  Presenting problems: moderate  Diagnostic procedures: minimal  Management options: moderate    Patient Progress  Patient progress: stable      Final diagnoses:   Migraine without status migrainosus, not intractable, unspecified migraine type       ED Disposition  ED Disposition     ED Disposition   Discharge    Condition   Stable    Comment   --             Mabel Patterson, APRN  [address] [address]  [phone]    In 2 days           Medication List      No changes were made to your prescriptions during this visit.          Han Varghese PAKellieC  10/16/22 1836

## 2022-11-06 ENCOUNTER — HOSPITAL ENCOUNTER (EMERGENCY)
Facility: HOSPITAL | Age: 47
Discharge: HOME | End: 2022-11-06
Attending: EMERGENCY MEDICINE | Admitting: EMERGENCY MEDICINE

## 2022-11-06 VITALS
HEIGHT: 68 IN | SYSTOLIC BLOOD PRESSURE: 143 MMHG | WEIGHT: 125 LBS | OXYGEN SATURATION: 100 % | TEMPERATURE: 98 F | HEART RATE: 62 BPM | RESPIRATION RATE: 18 BRPM | DIASTOLIC BLOOD PRESSURE: 87 MMHG | BODY MASS INDEX: 18.94 KG/M2

## 2022-11-06 DIAGNOSIS — G43.001 MIGRAINE WITHOUT AURA AND WITH STATUS MIGRAINOSUS, NOT INTRACTABLE: Primary | ICD-10-CM

## 2022-11-06 PROCEDURE — 96375 TX/PRO/DX INJ NEW DRUG ADDON: CPT

## 2022-11-06 PROCEDURE — 25010000002 DEXAMETHASONE SODIUM PHOSPHATE 10 MG/ML SOLUTION: Performed by: EMERGENCY MEDICINE

## 2022-11-06 PROCEDURE — 25010000002 HEPARIN LOCK FLUSH PER 10 UNITS: Performed by: EMERGENCY MEDICINE

## 2022-11-06 PROCEDURE — 99283 EMERGENCY DEPT VISIT LOW MDM: CPT

## 2022-11-06 PROCEDURE — 96374 THER/PROPH/DIAG INJ IV PUSH: CPT

## 2022-11-06 PROCEDURE — 25010000002 BUTORPHANOL PER 1 MG: Performed by: EMERGENCY MEDICINE

## 2022-11-06 PROCEDURE — 25010000002 DIPHENHYDRAMINE PER 50 MG: Performed by: EMERGENCY MEDICINE

## 2022-11-06 RX ORDER — HEPARIN SODIUM (PORCINE) LOCK FLUSH IV SOLN 100 UNIT/ML 100 UNIT/ML
300 SOLUTION INTRAVENOUS ONCE
Status: COMPLETED | OUTPATIENT
Start: 2022-11-06 | End: 2022-11-06

## 2022-11-06 RX ORDER — SODIUM CHLORIDE 0.9 % (FLUSH) 0.9 %
10 SYRINGE (ML) INJECTION AS NEEDED
Status: DISCONTINUED | OUTPATIENT
Start: 2022-11-06 | End: 2022-11-06 | Stop reason: HOSPADM

## 2022-11-06 RX ORDER — DIPHENHYDRAMINE HYDROCHLORIDE 50 MG/ML
25 INJECTION INTRAMUSCULAR; INTRAVENOUS ONCE
Status: COMPLETED | OUTPATIENT
Start: 2022-11-06 | End: 2022-11-06

## 2022-11-06 RX ORDER — DEXAMETHASONE SODIUM PHOSPHATE 10 MG/ML
10 INJECTION, SOLUTION INTRAMUSCULAR; INTRAVENOUS ONCE
Status: COMPLETED | OUTPATIENT
Start: 2022-11-06 | End: 2022-11-06

## 2022-11-06 RX ADMIN — SODIUM CHLORIDE 1000 ML: 9 INJECTION, SOLUTION INTRAVENOUS at 16:27

## 2022-11-06 RX ADMIN — DEXAMETHASONE SODIUM PHOSPHATE 10 MG: 10 INJECTION INTRAMUSCULAR; INTRAVENOUS at 16:27

## 2022-11-06 RX ADMIN — Medication 300 UNITS: at 17:53

## 2022-11-06 RX ADMIN — DIPHENHYDRAMINE HYDROCHLORIDE 25 MG: 50 INJECTION INTRAMUSCULAR; INTRAVENOUS at 16:27

## 2022-11-06 RX ADMIN — BUTORPHANOL TARTRATE 2 MG: 2 INJECTION, SOLUTION INTRAMUSCULAR; INTRAVENOUS at 16:27

## 2022-11-08 ENCOUNTER — OFFICE VISIT (OUTPATIENT)
Dept: UROLOGY | Facility: CLINIC | Age: 47
End: 2022-11-08

## 2022-11-08 VITALS — WEIGHT: 125 LBS | HEIGHT: 68 IN | BODY MASS INDEX: 18.94 KG/M2

## 2022-11-08 DIAGNOSIS — Z48.816 AFTERCARE FOLLOWING SURGERY OF THE GENITOURINARY SYSTEM: Primary | ICD-10-CM

## 2022-11-08 DIAGNOSIS — N23 RENAL COLIC: ICD-10-CM

## 2022-11-08 DIAGNOSIS — F31.9 BIPOLAR 1 DISORDER: ICD-10-CM

## 2022-11-08 DIAGNOSIS — N35.028 OTHER POST-TRAUMATIC URETHRAL STRICTURE, FEMALE: ICD-10-CM

## 2022-11-08 PROCEDURE — 53661 DILATION OF URETHRA: CPT | Performed by: UROLOGY

## 2022-11-08 RX ORDER — GENTAMICIN SULFATE 40 MG/ML
80 INJECTION, SOLUTION INTRAMUSCULAR; INTRAVENOUS ONCE
Status: COMPLETED | OUTPATIENT
Start: 2022-11-08 | End: 2022-11-08

## 2022-11-08 RX ORDER — PROMETHAZINE HYDROCHLORIDE 25 MG/1
25 TABLET ORAL EVERY 6 HOURS PRN
Qty: 21 TABLET | Refills: 2 | Status: SHIPPED | OUTPATIENT
Start: 2022-11-08 | End: 2022-12-09

## 2022-11-08 RX ORDER — OXYCODONE AND ACETAMINOPHEN 10; 325 MG/1; MG/1
1 TABLET ORAL EVERY 6 HOURS PRN
Qty: 20 TABLET | Refills: 0 | Status: SHIPPED | OUTPATIENT
Start: 2022-11-08 | End: 2022-12-06 | Stop reason: SDUPTHER

## 2022-11-08 RX ORDER — MEGESTROL ACETATE 20 MG/1
20 TABLET ORAL DAILY
Qty: 30 TABLET | Refills: 3 | Status: SHIPPED | OUTPATIENT
Start: 2022-11-08 | End: 2023-02-28 | Stop reason: SDUPTHER

## 2022-11-08 RX ADMIN — GENTAMICIN SULFATE 80 MG: 40 INJECTION, SOLUTION INTRAMUSCULAR; INTRAVENOUS at 09:29

## 2022-11-08 NOTE — PROGRESS NOTES
Chief Complaint:      Chief Complaint   Patient presents with   • Other post-traumatic urethreal stricture, female     4 week follow up       HPI:   47 y.o. female here for dilation secondary to severe urethral stricture disease    Past Medical History:     Past Medical History:   Diagnosis Date   • Abdominal pain    • Abdominal swelling    • Hoyos's disease    • Bipolar 1 disorder (HCC)    • Brain tumor (benign) (HCC)    • Brain tumor (HCC)     R Frontal Lobe per pt   • Brain tumor (HCC) 2014   • Constipation    • DDD (degenerative disc disease), cervical 05/29/2017   • DDD (degenerative disc disease), cervical    • Diarrhea    • Fibromyalgia    • IBS (irritable bowel syndrome)    • Migraine    • Nausea & vomiting    • PONV (postoperative nausea and vomiting)    • PTSD (post-traumatic stress disorder)    • Rectal bleeding        Current Meds:     Current Outpatient Medications   Medication Sig Dispense Refill   • albuterol (PROVENTIL HFA;VENTOLIN HFA) 108 (90 Base) MCG/ACT inhaler Inhale 2 puffs 2 (Two) Times a Day.     • Azelastine HCl 137 MCG/SPRAY solution 1 spray into each nostril As Needed (Allergies).     • busPIRone (BUSPAR) 15 MG tablet Take 15 mg by mouth 3 (three) times a day        • ciprofloxacin (CIPRO) 500 MG tablet Take 1 tablet by mouth 2 (Two) Times a Day. 19 tablet 0   • diclofenac (VOLTAREN) 1 % gel gel      • divalproex (DEPAKOTE) 500 MG DR tablet Take 1 tablet by mouth 3 (Three) Times a Day. 90 tablet 2   • docusate sodium (COLACE) 100 MG capsule Take 1 capsule by mouth 2 (Two) Times a Day. 20 capsule 0   • docusate sodium (COLACE) 100 MG capsule Take 1 capsule by mouth 2 (Two) Times a Day As Needed for Constipation. 60 capsule 0   • fluticasone (VERAMYST) 27.5 MCG/SPRAY nasal spray 2 sprays into each nostril Daily.     • megestrol (MEGACE) 20 MG tablet Take 1 tablet by mouth Daily. 30 tablet 3   • metroNIDAZOLE (FLAGYL) 500 MG tablet Take 1 tablet by mouth 3 (Three) Times a Day. 30 tablet 0    • montelukast (SINGULAIR) 10 MG tablet Take 10 mg by mouth Every Night.     • oxyCODONE-acetaminophen (Percocet)  MG per tablet Take 1 tablet by mouth Every 6 (Six) Hours As Needed for Moderate Pain. 20 tablet 0   • oxyCODONE-acetaminophen (PERCOCET) 5-325 MG per tablet Take 1 tablet by mouth Every 8 (Eight) Hours As Needed for Moderate Pain . 12 tablet 0   • pantoprazole (PROTONIX) 40 MG EC tablet Take 40 mg by mouth 2 (Two) Times a Day As Needed.     • phenazopyridine (PYRIDIUM) 100 MG tablet      • polyethylene glycol (MIRALAX) 17 GM/SCOOP powder Take 17 g by mouth Daily. 225 g 0   • predniSONE (DELTASONE) 50 MG tablet Take 1 tablet by mouth Daily. 5 tablet 0   • promethazine (PHENERGAN) 25 MG tablet Take 1 tablet by mouth Every 6 (Six) Hours As Needed for Nausea or Vomiting. 21 tablet 2   • sertraline (ZOLOFT) 100 MG tablet Take 100 mg by mouth 2 (Two) Times a Day.     • SUMAtriptan (IMITREX) 6 MG/0.5ML injection Inject 6 mg under the skin 1 (One) Time.     • terazosin (HYTRIN) 2 MG capsule Take 1 capsule by mouth Every Night for 14 days. 14 capsule 0   • traMADol (ULTRAM) 50 MG tablet        No current facility-administered medications for this visit.        Allergies:      Allergies   Allergen Reactions   • Ativan [Lorazepam] Hallucinations     confusion   • Sulfa Antibiotics Shortness Of Breath and Swelling   • Sulfa Antibiotics Anaphylaxis   • Reglan [Metoclopramide] Angioedema   • Compazine [Prochlorperazine Edisylate] Hives   • Demerol [Meperidine] Hives   • Droperidol Itching   • Metoclopramide Swelling   • Toradol [Ketorolac Tromethamine] Hives and Itching   • Toradol [Ketorolac Tromethamine] Hives   • Zofran [Ondansetron Hcl] Rash   • Zosyn [Piperacillin Sod-Tazobactam So] Hives        Past Surgical History:     Past Surgical History:   Procedure Laterality Date   • ANAL SCOPE N/A 7/28/2016    Procedure: ANAL SCOPE;  Surgeon: Kael Lopez MD;  Location: [facility];  Service:    • APPENDECTOMY      • COLONOSCOPY N/A 2016    Procedure: COLONOSCOPY  CPTCODE:06798;  Surgeon: Jose Antonio Belle III, MD;  [facility];  Service:    • COLONOSCOPY N/A 2016    Procedure: COLONOSCOPY (61677) CPT;  Surgeon: Jose Antonio Belle III, MD;  [facility];  Service:    • CYSTOSCOPY RETROGRADE PYELOGRAM Bilateral 2017    Procedure: CYSTOSCOPY RETROGRADE PYELOGRAM;  Surgeon: Mu Blunt MD;  [facility];  Service:    • ENDOSCOPY N/A 2016    Procedure: ESOPHAGOGASTRODUODENOSCOPY WITH BIOPSY  CPTCODE:97361;  Surgeon: Jose Antonio Belle III, MD;  [facility];  Service:    • HEMORRHOIDECTOMY N/A 2016    Procedure: HEMORRHOID STAPLING;  Surgeon: Kael Lopez MD;  [facility];  Service:    • HYSTERECTOMY     • KNEE SURGERY     • LAPAROSCOPIC SALPINGOOPHERECTOMY     • PORTACATH PLACEMENT N/A 2017    Procedure: INSERTION OF PORTACATH;  Surgeon: Celso Arredondo MD;  [facility];  Service:    • SHOULDER SURGERY      3 times       Social History:     Social History     Socioeconomic History   • Marital status:    Tobacco Use   • Smoking status: Former     Packs/day: 1.00     Years: 20.00     Pack years: 20.00     Types: Cigarettes     Quit date: 2015     Years since quittin.0   • Smokeless tobacco: Never   Vaping Use   • Vaping Use: Never used   Substance and Sexual Activity   • Alcohol use: No   • Drug use: Yes     Types: Marijuana     Comment: for pain   • Sexual activity: Defer     Birth control/protection: Surgical       Family History:     Family History   Problem Relation Age of Onset   • Crohn's disease Other    • Hypertension Other    • Diabetes Other    • Irritable bowel syndrome Other    • No Known Problems Father    • No Known Problems Mother        Review of Systems:     Review of Systems   Constitutional: Negative.  Negative for activity change, appetite change, chills, diaphoresis, fatigue and unexpected weight  change.   HENT: Negative for congestion, dental problem, drooling, ear discharge, ear pain, facial swelling, hearing loss, mouth sores, nosebleeds, postnasal drip, rhinorrhea, sinus pressure, sneezing, sore throat, tinnitus, trouble swallowing and voice change.    Eyes: Negative.  Negative for photophobia, pain, discharge, redness, itching and visual disturbance.   Respiratory: Negative.  Negative for apnea, cough, choking, chest tightness, shortness of breath, wheezing and stridor.    Cardiovascular: Negative.  Negative for chest pain, palpitations and leg swelling.   Gastrointestinal: Negative.  Negative for abdominal distention, abdominal pain, anal bleeding, blood in stool, constipation, diarrhea, nausea, rectal pain and vomiting.   Endocrine: Negative.  Negative for cold intolerance, heat intolerance, polydipsia, polyphagia and polyuria.   Genitourinary: Positive for difficulty urinating.   Musculoskeletal: Negative.  Negative for arthralgias, back pain, gait problem, joint swelling, myalgias, neck pain and neck stiffness.   Skin: Negative.  Negative for color change, pallor, rash and wound.   Allergic/Immunologic: Negative.  Negative for environmental allergies, food allergies and immunocompromised state.   Neurological: Negative.  Negative for dizziness, tremors, seizures, syncope, facial asymmetry, speech difficulty, weakness, light-headedness, numbness and headaches.   Hematological: Negative.  Negative for adenopathy. Does not bruise/bleed easily.   Psychiatric/Behavioral: Negative for agitation, behavioral problems, confusion, decreased concentration, dysphoric mood, hallucinations, self-injury, sleep disturbance and suicidal ideas. The patient is not nervous/anxious and is not hyperactive.    All other systems reviewed and are negative.      Physical Exam:     Physical Exam  Constitutional:       Appearance: She is well-developed.   HENT:      Head: Normocephalic and atraumatic.      Right Ear: External  ear normal.      Left Ear: External ear normal.   Eyes:      Conjunctiva/sclera: Conjunctivae normal.      Pupils: Pupils are equal, round, and reactive to light.   Cardiovascular:      Rate and Rhythm: Normal rate and regular rhythm.      Heart sounds: Normal heart sounds.   Pulmonary:      Effort: Pulmonary effort is normal.      Breath sounds: Normal breath sounds.   Abdominal:      General: Bowel sounds are normal. There is no distension.      Palpations: Abdomen is soft. There is no mass.      Tenderness: There is no abdominal tenderness. There is no guarding or rebound.   Genitourinary:     General: Normal vulva.      Vagina: No vaginal discharge.   Musculoskeletal:         General: Normal range of motion.   Skin:     General: Skin is warm and dry.   Neurological:      Mental Status: She is alert.      Deep Tendon Reflexes: Reflexes are normal and symmetric.   Psychiatric:         Behavior: Behavior normal.         Thought Content: Thought content normal.         Judgment: Judgment normal.         I have reviewed the following portions of the patient's history: Allergies, current medications, past family history, past medical history, past social history, past surgical history, problem list, and ROS and confirm it is accurate.    Recent Image (CT and/or KUB):      CT Abdomen and Pelvis: No results found for this or any previous visit.       CT Stone Protocol: Results for orders placed during the hospital encounter of 10/03/22    CT Abdomen Pelvis Stone Protocol    Narrative  EXAM:  CT Abdomen and Pelvis Without Intravenous Contrast    EXAM DATE:  10/3/2022 7:24 AM    CLINICAL HISTORY:  Flank pain, kidney stone suspected    TECHNIQUE:  Axial computed tomography images of the abdomen and pelvis without  intravenous contrast.  Sagittal and coronal reformatted images were  created and reviewed.  This CT exam was performed using one or more of  the following dose reduction techniques:  automated exposure  control,  adjustment of the mA and/or kV according to patient size, and/or use of  iterative reconstruction technique.    COMPARISON:  CT ABDOMEN PELVIS STONE PROTOCOL- dated 07/19/2022    FINDINGS:  LUNG BASES:  Unremarkable.  No mass.  No consolidation.    ABDOMEN:  LIVER:  Unremarkable.  GALLBLADDER AND BILE DUCTS:  Unremarkable.  No calcified stones.  No  ductal dilation.  PANCREAS:  Unremarkable.  No ductal dilation.  SPLEEN:  Unremarkable.  No splenomegaly.  ADRENALS:  Unremarkable.  No mass.  KIDNEYS AND URETERS:  Unremarkable.  No obstructing stones.  No  hydronephrosis.  STOMACH AND BOWEL:  Unremarkable.  No obstruction.  No mucosal  thickening.    PELVIS:  APPENDIX:  No findings to suggest acute appendicitis.  BLADDER:  Unremarkable.  No stones.  REPRODUCTIVE:  Unremarkable as visualized.    ABDOMEN and PELVIS:  INTRAPERITONEAL SPACE:  Unremarkable.  No free air.  No significant  fluid collection.  BONES/JOINTS:  L2 vertebral body compression deformity again noted.  No dislocation.  SOFT TISSUES:  Unremarkable.  VASCULATURE:  Atherosclerotic disease.  No abdominal aortic aneurysm.  LYMPH NODES:  Unremarkable.  No enlarged lymph nodes.    Impression  No acute findings in the abdomen or pelvis.    This report was finalized on 10/3/2022 8:15 AM by Dr. Rigoberto Huddleston MD.       KUB: Results for orders placed during the hospital encounter of 10/01/22    XR Abdomen KUB    Narrative  KUB, 10/1/2022    HISTORY:  47-year-old female in the ED with flank pain, hematuria.    TECHNIQUE:  AP lower abdomen and pelvis.    COMPARISON:  *  CT abdomen/pelvis, 7/19/2022.    FINDINGS:  No visible radiopaque renal or urinary tract calculi on this examination, or on the prior CT stone study.    Visualized bowel gas pattern is normal. Moderate volume stool throughout normal caliber colon.    Surgical clips in the region of the lower rectum.    Impression  Negative KUB.    Signer Name: Sang Painting MD  Signed: 10/1/2022 3:55  PM  Workstation Name: [id number]  [facility] (past 3 months):      Admission on 10/03/2022, Discharged on 10/03/2022   Component Date Value Ref Range Status   • Color, UA 10/03/2022 Red (A)  Yellow, Straw Final    Dipstick results may be inaccurate due to color interference.    • Appearance, UA 10/03/2022 Turbid (A)  Clear Final   • pH, UA 10/03/2022 5.5  5.0 - 8.0 Final   • Specific Gravity, UA 10/03/2022 1.021  1.005 - 1.030 Final   • Glucose, UA 10/03/2022 Negative  Negative Final   • Ketones, UA 10/03/2022 Trace (A)  Negative Final   • Bilirubin, UA 10/03/2022 Small (1+) (A)  Negative Final   • Blood, UA 10/03/2022 Large (3+) (A)  Negative Final   • Protein, UA 10/03/2022 30 mg/dL (1+) (A)  Negative Final   • Leuk Esterase, UA 10/03/2022 Small (1+) (A)  Negative Final   • Nitrite, UA 10/03/2022 Negative  Negative Final   • Urobilinogen, UA 10/03/2022 1.0 E.U./dL  0.2 - 1.0 E.U./dL Final   • RBC, UA 10/03/2022 Too Numerous to Count (A)  None Seen, 0-2 /HPF Final   • WBC, UA 10/03/2022 3-5 (A)  None Seen, 0-2 /HPF Final    Urine culture not indicated.   • Bacteria, UA 10/03/2022 Trace (A)  None Seen /HPF Final   • Squamous Epithelial Cells, UA 10/03/2022 0-2  None Seen, 0-2 /HPF Final   • Hyaline Casts, UA 10/03/2022 None Seen  None Seen /LPF Final   • Methodology 10/03/2022 Manual Light Microscopy   Final   • Glucose 10/03/2022 110 (H)  65 - 99 mg/dL Final   • BUN 10/03/2022 9  6 - 20 mg/dL Final   • Creatinine 10/03/2022 0.58  0.57 - 1.00 mg/dL Final   • Sodium 10/03/2022 141  136 - 145 mmol/L Final   • Potassium 10/03/2022 3.7  3.5 - 5.2 mmol/L Final    Slight hemolysis detected by analyzer. Results may be affected.   • Chloride 10/03/2022 106  98 - 107 mmol/L Final   • CO2 10/03/2022 22.4  22.0 - 29.0 mmol/L Final   • Calcium 10/03/2022 9.5  8.6 - 10.5 mg/dL Final   • Total Protein 10/03/2022 7.0  6.0 - 8.5 g/dL Final   • Albumin 10/03/2022 4.58  3.50 - 5.20 g/dL Final    • ALT (SGPT) 10/03/2022 40 (H)  1 - 33 U/L Final   • AST (SGOT) 10/03/2022 36 (H)  1 - 32 U/L Final   • Alkaline Phosphatase 10/03/2022 72  39 - 117 U/L Final   • Total Bilirubin 10/03/2022 1.5 (H)  0.0 - 1.2 mg/dL Final   • Globulin 10/03/2022 2.4  gm/dL Final   • A/G Ratio 10/03/2022 1.9  g/dL Final   • BUN/Creatinine Ratio 10/03/2022 15.5  7.0 - 25.0 Final   • Anion Gap 10/03/2022 12.6  5.0 - 15.0 mmol/L Final   • eGFR 10/03/2022 112.5  >60.0 mL/min/1.73 Final    National Kidney Foundation and American Society of Nephrology (ASN) Task Force recommended calculation based on the Chronic Kidney Disease Epidemiology Collaboration (CKD-EPI) equation refit without adjustment for race.   • WBC 10/03/2022 5.62  3.40 - 10.80 10*3/mm3 Final   • RBC 10/03/2022 4.81  3.77 - 5.28 10*6/mm3 Final   • Hemoglobin 10/03/2022 15.0  12.0 - 15.9 g/dL Final   • Hematocrit 10/03/2022 43.5  34.0 - 46.6 % Final   • MCV 10/03/2022 90.4  79.0 - 97.0 fL Final   • MCH 10/03/2022 31.2  26.6 - 33.0 pg Final   • MCHC 10/03/2022 34.5  31.5 - 35.7 g/dL Final   • RDW 10/03/2022 13.2  12.3 - 15.4 % Final   • RDW-SD 10/03/2022 43.3  37.0 - 54.0 fl Final   • MPV 10/03/2022 9.9  6.0 - 12.0 fL Final   • Platelets 10/03/2022 162  140 - 450 10*3/mm3 Final   • Neutrophil % 10/03/2022 66.5  42.7 - 76.0 % Final   • Lymphocyte % 10/03/2022 24.9  19.6 - 45.3 % Final   • Monocyte % 10/03/2022 6.8  5.0 - 12.0 % Final   • Eosinophil % 10/03/2022 1.4  0.3 - 6.2 % Final   • Basophil % 10/03/2022 0.2  0.0 - 1.5 % Final   • Immature Grans % 10/03/2022 0.2  0.0 - 0.5 % Final   • Neutrophils, Absolute 10/03/2022 3.74  1.70 - 7.00 10*3/mm3 Final   • Lymphocytes, Absolute 10/03/2022 1.40  0.70 - 3.10 10*3/mm3 Final   • Monocytes, Absolute 10/03/2022 0.38  0.10 - 0.90 10*3/mm3 Final   • Eosinophils, Absolute 10/03/2022 0.08  0.00 - 0.40 10*3/mm3 Final   • Basophils, Absolute 10/03/2022 0.01  0.00 - 0.20 10*3/mm3 Final   • Immature Grans, Absolute 10/03/2022 0.01  0.00  - 0.05 10*3/mm3 Final   • nRBC 10/03/2022 0.0  0.0 - 0.2 /100 WBC Final   Admission on 10/01/2022, Discharged on 10/01/2022   Component Date Value Ref Range Status   • Glucose 10/01/2022 88  65 - 99 mg/dL Final   • BUN 10/01/2022 6  6 - 20 mg/dL Final   • Creatinine 10/01/2022 0.57  0.57 - 1.00 mg/dL Final   • Sodium 10/01/2022 142  136 - 145 mmol/L Final   • Potassium 10/01/2022 4.3  3.5 - 5.2 mmol/L Final   • Chloride 10/01/2022 105  98 - 107 mmol/L Final   • CO2 10/01/2022 27.1  22.0 - 29.0 mmol/L Final   • Calcium 10/01/2022 9.4  8.6 - 10.5 mg/dL Final   • Total Protein 10/01/2022 6.9  6.0 - 8.5 g/dL Final   • Albumin 10/01/2022 4.42  3.50 - 5.20 g/dL Final   • ALT (SGPT) 10/01/2022 41 (H)  1 - 33 U/L Final   • AST (SGOT) 10/01/2022 34 (H)  1 - 32 U/L Final   • Alkaline Phosphatase 10/01/2022 70  39 - 117 U/L Final   • Total Bilirubin 10/01/2022 0.8  0.0 - 1.2 mg/dL Final   • Globulin 10/01/2022 2.5  gm/dL Final   • A/G Ratio 10/01/2022 1.8  g/dL Final   • BUN/Creatinine Ratio 10/01/2022 10.5  7.0 - 25.0 Final   • Anion Gap 10/01/2022 9.9  5.0 - 15.0 mmol/L Final   • eGFR 10/01/2022 113.0  >60.0 mL/min/1.73 Final    National Kidney Foundation and American Society of Nephrology (ASN) Task Force recommended calculation based on the Chronic Kidney Disease Epidemiology Collaboration (CKD-EPI) equation refit without adjustment for race.   • Color, UA 10/01/2022 Red (A)  Yellow, Straw Final    Dipstick results may be inaccurate due to color interference.       • Appearance, UA 10/01/2022 Turbid (A)  Clear Final   • pH, UA 10/01/2022 8.5 (H)  5.0 - 8.0 Final   • Specific Gravity, UA 10/01/2022 1.010  1.005 - 1.030 Final   • Glucose, UA 10/01/2022 Negative  Negative Final   • Ketones, UA 10/01/2022 Negative  Negative Final   • Bilirubin, UA 10/01/2022 Negative  Negative Final   • Blood, UA 10/01/2022 Large (3+) (A)  Negative Final   • Protein, UA 10/01/2022 30 mg/dL (1+) (A)  Negative Final   • Leuk Esterase, UA  10/01/2022 Trace (A)  Negative Final   • Nitrite, UA 10/01/2022 Negative  Negative Final   • Urobilinogen, UA 10/01/2022 1.0 E.U./dL  0.2 - 1.0 E.U./dL Final   • WBC 10/01/2022 5.16  3.40 - 10.80 10*3/mm3 Final   • RBC 10/01/2022 4.73  3.77 - 5.28 10*6/mm3 Final   • Hemoglobin 10/01/2022 14.6  12.0 - 15.9 g/dL Final   • Hematocrit 10/01/2022 43.1  34.0 - 46.6 % Final   • MCV 10/01/2022 91.1  79.0 - 97.0 fL Final   • MCH 10/01/2022 30.9  26.6 - 33.0 pg Final   • MCHC 10/01/2022 33.9  31.5 - 35.7 g/dL Final   • RDW 10/01/2022 13.1  12.3 - 15.4 % Final   • RDW-SD 10/01/2022 43.5  37.0 - 54.0 fl Final   • MPV 10/01/2022 10.1  6.0 - 12.0 fL Final   • Platelets 10/01/2022 163  140 - 450 10*3/mm3 Final   • Neutrophil % 10/01/2022 61.6  42.7 - 76.0 % Final   • Lymphocyte % 10/01/2022 30.6  19.6 - 45.3 % Final   • Monocyte % 10/01/2022 6.2  5.0 - 12.0 % Final   • Eosinophil % 10/01/2022 1.2  0.3 - 6.2 % Final   • Basophil % 10/01/2022 0.2  0.0 - 1.5 % Final   • Immature Grans % 10/01/2022 0.2  0.0 - 0.5 % Final   • Neutrophils, Absolute 10/01/2022 3.18  1.70 - 7.00 10*3/mm3 Final   • Lymphocytes, Absolute 10/01/2022 1.58  0.70 - 3.10 10*3/mm3 Final   • Monocytes, Absolute 10/01/2022 0.32  0.10 - 0.90 10*3/mm3 Final   • Eosinophils, Absolute 10/01/2022 0.06  0.00 - 0.40 10*3/mm3 Final   • Basophils, Absolute 10/01/2022 0.01  0.00 - 0.20 10*3/mm3 Final   • Immature Grans, Absolute 10/01/2022 0.01  0.00 - 0.05 10*3/mm3 Final   • nRBC 10/01/2022 0.0  0.0 - 0.2 /100 WBC Final   • Extra Tube 10/01/2022 Hold for add-ons.   Final    Auto resulted.   • Extra Tube 10/01/2022 hold for add-on   Final    Auto resulted   • Extra Tube 10/01/2022 Hold for add-ons.   Final    Auto resulted.   • Extra Tube 10/01/2022 Hold for add-ons.   Final    Auto resulted   • RBC, UA 10/01/2022 Too Numerous to Count (A)  None Seen, 0-2 /HPF Final   • WBC, UA 10/01/2022 6-12 (A)  None Seen, 0-2 /HPF Final   • Bacteria, UA 10/01/2022 None Seen  None Seen  /HPF Final   • Squamous Epithelial Cells, UA 10/01/2022 3-6 (A)  None Seen, 0-2 /HPF Final   • Yeast, UA 10/01/2022 Small/1+ Yeast  None Seen /HPF Final   • Hyaline Casts, UA 10/01/2022 3-6  None Seen /LPF Final   • Methodology 10/01/2022 Automated Microscopy   Final   Admission on 09/10/2022, Discharged on 09/10/2022   Component Date Value Ref Range Status   • COVID19 09/09/2022 Not Detected  Not Detected - Ref. Range Final   • Influenza A PCR 09/09/2022 Not Detected  Not Detected Final   • Influenza B PCR 09/09/2022 Not Detected  Not Detected Final   Admission on 08/23/2022, Discharged on 08/24/2022   Component Date Value Ref Range Status   • Glucose 08/23/2022 89  65 - 99 mg/dL Final   • BUN 08/23/2022 11  6 - 20 mg/dL Final   • Creatinine 08/23/2022 0.60  0.57 - 1.00 mg/dL Final   • Sodium 08/23/2022 138  136 - 145 mmol/L Final   • Potassium 08/23/2022 3.6  3.5 - 5.2 mmol/L Final    Slight hemolysis detected by analyzer. Results may be affected.   • Chloride 08/23/2022 104  98 - 107 mmol/L Final   • CO2 08/23/2022 24.8  22.0 - 29.0 mmol/L Final   • Calcium 08/23/2022 9.4  8.6 - 10.5 mg/dL Final   • Total Protein 08/23/2022 6.8  6.0 - 8.5 g/dL Final   • Albumin 08/23/2022 4.53  3.50 - 5.20 g/dL Final   • ALT (SGPT) 08/23/2022 26  1 - 33 U/L Final   • AST (SGOT) 08/23/2022 27  1 - 32 U/L Final   • Alkaline Phosphatase 08/23/2022 76  39 - 117 U/L Final   • Total Bilirubin 08/23/2022 0.5  0.0 - 1.2 mg/dL Final   • Globulin 08/23/2022 2.3  gm/dL Final   • A/G Ratio 08/23/2022 2.0  g/dL Final   • BUN/Creatinine Ratio 08/23/2022 18.3  7.0 - 25.0 Final   • Anion Gap 08/23/2022 9.2  5.0 - 15.0 mmol/L Final   • eGFR 08/23/2022 111.6  >60.0 mL/min/1.73 Final    National Kidney Foundation and American Society of Nephrology (ASN) Task Force recommended calculation based on the Chronic Kidney Disease Epidemiology Collaboration (CKD-EPI) equation refit without adjustment for race.   • WBC 08/23/2022 6.41  3.40 - 10.80 10*3/mm3  Final   • RBC 08/23/2022 4.58  3.77 - 5.28 10*6/mm3 Final   • Hemoglobin 08/23/2022 14.2  12.0 - 15.9 g/dL Final   • Hematocrit 08/23/2022 41.7  34.0 - 46.6 % Final   • MCV 08/23/2022 91.0  79.0 - 97.0 fL Final   • MCH 08/23/2022 31.0  26.6 - 33.0 pg Final   • MCHC 08/23/2022 34.1  31.5 - 35.7 g/dL Final   • RDW 08/23/2022 13.4  12.3 - 15.4 % Final   • RDW-SD 08/23/2022 44.8  37.0 - 54.0 fl Final   • MPV 08/23/2022 10.1  6.0 - 12.0 fL Final   • Platelets 08/23/2022 176  140 - 450 10*3/mm3 Final   • Neutrophil % 08/23/2022 47.0  42.7 - 76.0 % Final   • Lymphocyte % 08/23/2022 43.7  19.6 - 45.3 % Final   • Monocyte % 08/23/2022 6.9  5.0 - 12.0 % Final   • Eosinophil % 08/23/2022 1.9  0.3 - 6.2 % Final   • Basophil % 08/23/2022 0.3  0.0 - 1.5 % Final   • Immature Grans % 08/23/2022 0.2  0.0 - 0.5 % Final   • Neutrophils, Absolute 08/23/2022 3.02  1.70 - 7.00 10*3/mm3 Final   • Lymphocytes, Absolute 08/23/2022 2.80  0.70 - 3.10 10*3/mm3 Final   • Monocytes, Absolute 08/23/2022 0.44  0.10 - 0.90 10*3/mm3 Final   • Eosinophils, Absolute 08/23/2022 0.12  0.00 - 0.40 10*3/mm3 Final   • Basophils, Absolute 08/23/2022 0.02  0.00 - 0.20 10*3/mm3 Final   • Immature Grans, Absolute 08/23/2022 0.01  0.00 - 0.05 10*3/mm3 Final   • nRBC 08/23/2022 0.0  0.0 - 0.2 /100 WBC Final   • Extra Tube 08/23/2022 Hold for add-ons.   Final    Auto resulted.   • Extra Tube 08/23/2022 hold for add-on   Final    Auto resulted   • Extra Tube 08/23/2022 Hold for add-ons.   Final    Auto resulted.   • Extra Tube 08/23/2022 Hold for add-ons.   Final    Auto resulted   Admission on 08/12/2022, Discharged on 08/12/2022   Component Date Value Ref Range Status   • Color, UA 08/12/2022 Orange (A)  Yellow, Straw Final   • Appearance, UA 08/12/2022 Cloudy (A)  Clear Final   • pH, UA 08/12/2022 6.0  5.0 - 8.0 Final   • Specific Gravity, UA 08/12/2022 1.011  1.005 - 1.030 Final   • Glucose, UA 08/12/2022 Negative  Negative Final   • Ketones, UA 08/12/2022  Negative  Negative Final   • Bilirubin, UA 08/12/2022 Negative  Negative Final   • Blood, UA 08/12/2022 Large (3+) (A)  Negative Final   • Protein, UA 08/12/2022 Trace (A)  Negative Final   • Leuk Esterase, UA 08/12/2022 Trace (A)  Negative Final   • Nitrite, UA 08/12/2022 Negative  Negative Final   • Urobilinogen, UA 08/12/2022 1.0 E.U./dL  0.2 - 1.0 E.U./dL Final   • Extra Tube 08/12/2022 Hold for add-ons.   Final    Auto resulted.   • Extra Tube 08/12/2022 hold for add-on   Final    Auto resulted   • Extra Tube 08/12/2022 Hold for add-ons.   Final    Auto resulted.   • Extra Tube 08/12/2022 Hold for add-ons.   Final    Auto resulted   • RBC, UA 08/12/2022 Too Numerous to Count (A)  None Seen, 0-2 /HPF Final   • WBC, UA 08/12/2022 3-5 (A)  None Seen, 0-2 /HPF Final   • Bacteria, UA 08/12/2022 None Seen  None Seen /HPF Final   • Squamous Epithelial Cells, UA 08/12/2022 0-2  None Seen, 0-2 /HPF Final   • Hyaline Casts, UA 08/12/2022 None Seen  None Seen /LPF Final   • Methodology 08/12/2022 Automated Microscopy   Final   • Glucose 08/12/2022 98  65 - 99 mg/dL Final   • BUN 08/12/2022 7  6 - 20 mg/dL Final   • Creatinine 08/12/2022 0.53 (L)  0.57 - 1.00 mg/dL Final   • Sodium 08/12/2022 136  136 - 145 mmol/L Final   • Potassium 08/12/2022 3.6  3.5 - 5.2 mmol/L Final    Slight hemolysis detected by analyzer. Results may be affected.   • Chloride 08/12/2022 102  98 - 107 mmol/L Final   • CO2 08/12/2022 23.4  22.0 - 29.0 mmol/L Final   • Calcium 08/12/2022 9.3  8.6 - 10.5 mg/dL Final   • Total Protein 08/12/2022 6.7  6.0 - 8.5 g/dL Final   • Albumin 08/12/2022 4.44  3.50 - 5.20 g/dL Final   • ALT (SGPT) 08/12/2022 33  1 - 33 U/L Final   • AST (SGOT) 08/12/2022 34 (H)  1 - 32 U/L Final   • Alkaline Phosphatase 08/12/2022 71  39 - 117 U/L Final   • Total Bilirubin 08/12/2022 0.8  0.0 - 1.2 mg/dL Final   • Globulin 08/12/2022 2.3  gm/dL Final   • A/G Ratio 08/12/2022 2.0  g/dL Final   • BUN/Creatinine Ratio 08/12/2022 13.2   7.0 - 25.0 Final   • Anion Gap 08/12/2022 10.6  5.0 - 15.0 mmol/L Final   • eGFR 08/12/2022 115.0  >60.0 mL/min/1.73 Final    National Kidney Foundation and American Society of Nephrology (ASN) Task Force recommended calculation based on the Chronic Kidney Disease Epidemiology Collaboration (CKD-EPI) equation refit without adjustment for race.   • C-Reactive Protein 08/12/2022 <0.30  0.00 - 0.50 mg/dL Final   • WBC 08/12/2022 6.37  3.40 - 10.80 10*3/mm3 Final   • RBC 08/12/2022 4.70  3.77 - 5.28 10*6/mm3 Final   • Hemoglobin 08/12/2022 14.3  12.0 - 15.9 g/dL Final   • Hematocrit 08/12/2022 43.0  34.0 - 46.6 % Final   • MCV 08/12/2022 91.5  79.0 - 97.0 fL Final   • MCH 08/12/2022 30.4  26.6 - 33.0 pg Final   • MCHC 08/12/2022 33.3  31.5 - 35.7 g/dL Final   • RDW 08/12/2022 13.1  12.3 - 15.4 % Final   • RDW-SD 08/12/2022 43.9  37.0 - 54.0 fl Final   • MPV 08/12/2022 10.0  6.0 - 12.0 fL Final   • Platelets 08/12/2022 160  140 - 450 10*3/mm3 Final   • Neutrophil % 08/12/2022 69.5  42.7 - 76.0 % Final   • Lymphocyte % 08/12/2022 23.9  19.6 - 45.3 % Final   • Monocyte % 08/12/2022 5.2  5.0 - 12.0 % Final   • Eosinophil % 08/12/2022 1.1  0.3 - 6.2 % Final   • Basophil % 08/12/2022 0.0  0.0 - 1.5 % Final   • Immature Grans % 08/12/2022 0.3  0.0 - 0.5 % Final   • Neutrophils, Absolute 08/12/2022 4.43  1.70 - 7.00 10*3/mm3 Final   • Lymphocytes, Absolute 08/12/2022 1.52  0.70 - 3.10 10*3/mm3 Final   • Monocytes, Absolute 08/12/2022 0.33  0.10 - 0.90 10*3/mm3 Final   • Eosinophils, Absolute 08/12/2022 0.07  0.00 - 0.40 10*3/mm3 Final   • Basophils, Absolute 08/12/2022 0.00  0.00 - 0.20 10*3/mm3 Final   • Immature Grans, Absolute 08/12/2022 0.02  0.00 - 0.05 10*3/mm3 Final   • nRBC 08/12/2022 0.0  0.0 - 0.2 /100 WBC Final        Procedure:   Urethral dilation-after an appropriate informed consent, the patient was brought to the procedure suite.  The urethra was gently anesthetized with 10 mL of 2% viscous Xylocaine jelly.   After an adequate period of topical anesthesia I went ahead and  dilated with [place] sounds from 16 to 26 Sy[place]ac sequentially without complication. The patient was given gentamicin as prophylaxis with 80 mg.    Assessment/Plan:   Urethral stricture-posttraumatic  Narcotic pain medication-patient has significant acute pain that I believe would be an indication for the use of narcotic pain medication.  I discussed the significant risks of pain medication and the fact that this will be a short only option and I will give her no more than a three-day supply of pain medication, I will not plan long-term medication, and that this will be sent to a pain clinic if it at all becomes necessary.  We discussed signing a pain medication agreement and the fact that we're going to run a state [place] review to be sure the patient is not getting pain medication from elsewhere.  If this is the case, we will not give pain medication as part of the patient's treatment plan of there being prescribed a controlled substance with potential for abuse.  This patient has been well aware of the appropriate dose of such medications including the risks for somnolence, limited ability to drive and/or safety and the significant potential for overdose.  It has been made clear that these medications are for the prescribed patient only without concomitant use of alcohol or other substance unless prescribed by the medical provider.  Has completed prescribing agreement detailing the terms of continue prescribing him a controlled substance including monitoring [place] Alberto reports, the possibility of urine drug screens, and pill counts.  The patient is aware that we review [facility]BERTO reports on a regular basis and scan them into the chart.  History and physical examination exhibited continued safe and appropriate use of controlled substances. We also discussed the fact that the new [place] legislation allows only a three-day prescription for pain medication.   In this situation he will be referred to a chronic pain clinic.            This document has been electronically signed by VERENICE FINK MD November 8, 2022 09:06 EST    Dictated Utilizing Dragon Dictation: Part of this note may be an electronic transcription/translation of spoken language to printed text using the Dragon Dictation System.

## 2022-11-26 ENCOUNTER — HOSPITAL ENCOUNTER (EMERGENCY)
Facility: HOSPITAL | Age: 47
Discharge: HOME | End: 2022-11-27
Attending: STUDENT IN AN ORGANIZED HEALTH CARE EDUCATION/TRAINING PROGRAM | Admitting: STUDENT IN AN ORGANIZED HEALTH CARE EDUCATION/TRAINING PROGRAM

## 2022-11-26 ENCOUNTER — APPOINTMENT (OUTPATIENT)
Dept: GENERAL RADIOLOGY | Facility: HOSPITAL | Age: 47
End: 2022-11-26

## 2022-11-26 ENCOUNTER — APPOINTMENT (OUTPATIENT)
Dept: CT IMAGING | Facility: HOSPITAL | Age: 47
End: 2022-11-26

## 2022-11-26 DIAGNOSIS — R09.1 PLEURISY: Primary | ICD-10-CM

## 2022-11-26 LAB
ALBUMIN SERPL-MCNC: 4.11 G/DL (ref 3.5–5.2)
ALBUMIN/GLOB SERPL: 1.7 G/DL
ALP SERPL-CCNC: 69 U/L (ref 39–117)
ALT SERPL W P-5'-P-CCNC: 35 U/L (ref 1–33)
ANION GAP SERPL CALCULATED.3IONS-SCNC: 13.1 MMOL/L (ref 5–15)
AST SERPL-CCNC: 34 U/L (ref 1–32)
BASOPHILS # BLD AUTO: 0.01 10*3/MM3 (ref 0–0.2)
BASOPHILS NFR BLD AUTO: 0.2 % (ref 0–1.5)
BILIRUB SERPL-MCNC: 0.6 MG/DL (ref 0–1.2)
BUN SERPL-MCNC: 7 MG/DL (ref 6–20)
BUN/CREAT SERPL: 11.1 (ref 7–25)
CALCIUM SPEC-SCNC: 9 MG/DL (ref 8.6–10.5)
CHLORIDE SERPL-SCNC: 103 MMOL/L (ref 98–107)
CO2 SERPL-SCNC: 22.9 MMOL/L (ref 22–29)
CREAT SERPL-MCNC: 0.63 MG/DL (ref 0.57–1)
D DIMER PPP FEU-MCNC: 1.62 MCGFEU/ML (ref 0–0.5)
DEPRECATED RDW RBC AUTO: 45 FL (ref 37–54)
EGFRCR SERPLBLD CKD-EPI 2021: 110.3 ML/MIN/1.73
EOSINOPHIL # BLD AUTO: 0.1 10*3/MM3 (ref 0–0.4)
EOSINOPHIL NFR BLD AUTO: 1.8 % (ref 0.3–6.2)
ERYTHROCYTE [DISTWIDTH] IN BLOOD BY AUTOMATED COUNT: 13.2 % (ref 12.3–15.4)
GLOBULIN UR ELPH-MCNC: 2.5 GM/DL
GLUCOSE SERPL-MCNC: 110 MG/DL (ref 65–99)
HCT VFR BLD AUTO: 45.1 % (ref 34–46.6)
HGB BLD-MCNC: 14.8 G/DL (ref 12–15.9)
HOLD SPECIMEN: NORMAL
HOLD SPECIMEN: NORMAL
IMM GRANULOCYTES # BLD AUTO: 0.01 10*3/MM3 (ref 0–0.05)
IMM GRANULOCYTES NFR BLD AUTO: 0.2 % (ref 0–0.5)
LYMPHOCYTES # BLD AUTO: 1.85 10*3/MM3 (ref 0.7–3.1)
LYMPHOCYTES NFR BLD AUTO: 34.2 % (ref 19.6–45.3)
MCH RBC QN AUTO: 30.7 PG (ref 26.6–33)
MCHC RBC AUTO-ENTMCNC: 32.8 G/DL (ref 31.5–35.7)
MCV RBC AUTO: 93.6 FL (ref 79–97)
MONOCYTES # BLD AUTO: 0.33 10*3/MM3 (ref 0.1–0.9)
MONOCYTES NFR BLD AUTO: 6.1 % (ref 5–12)
NEUTROPHILS NFR BLD AUTO: 3.11 10*3/MM3 (ref 1.7–7)
NEUTROPHILS NFR BLD AUTO: 57.5 % (ref 42.7–76)
NRBC BLD AUTO-RTO: 0 /100 WBC (ref 0–0.2)
PLATELET # BLD AUTO: 164 10*3/MM3 (ref 140–450)
PMV BLD AUTO: 10.4 FL (ref 6–12)
POTASSIUM SERPL-SCNC: 3.5 MMOL/L (ref 3.5–5.2)
PROT SERPL-MCNC: 6.6 G/DL (ref 6–8.5)
QT INTERVAL: 392 MS
QTC INTERVAL: 446 MS
RBC # BLD AUTO: 4.82 10*6/MM3 (ref 3.77–5.28)
SODIUM SERPL-SCNC: 139 MMOL/L (ref 136–145)
TROPONIN T SERPL-MCNC: <0.01 NG/ML (ref 0–0.03)
TROPONIN T SERPL-MCNC: <0.01 NG/ML (ref 0–0.03)
WBC NRBC COR # BLD: 5.41 10*3/MM3 (ref 3.4–10.8)
WHOLE BLOOD HOLD COAG: NORMAL
WHOLE BLOOD HOLD SPECIMEN: NORMAL

## 2022-11-26 PROCEDURE — 71045 X-RAY EXAM CHEST 1 VIEW: CPT

## 2022-11-26 PROCEDURE — 0 IOPAMIDOL PER 1 ML: Performed by: STUDENT IN AN ORGANIZED HEALTH CARE EDUCATION/TRAINING PROGRAM

## 2022-11-26 PROCEDURE — 96375 TX/PRO/DX INJ NEW DRUG ADDON: CPT

## 2022-11-26 PROCEDURE — 25010000002 DEXAMETHASONE SODIUM PHOSPHATE 10 MG/ML SOLUTION: Performed by: PHYSICIAN ASSISTANT

## 2022-11-26 PROCEDURE — 36415 COLL VENOUS BLD VENIPUNCTURE: CPT

## 2022-11-26 PROCEDURE — 25010000002 MORPHINE PER 10 MG: Performed by: STUDENT IN AN ORGANIZED HEALTH CARE EDUCATION/TRAINING PROGRAM

## 2022-11-26 PROCEDURE — 84484 ASSAY OF TROPONIN QUANT: CPT | Performed by: PHYSICIAN ASSISTANT

## 2022-11-26 PROCEDURE — 93005 ELECTROCARDIOGRAM TRACING: CPT | Performed by: PHYSICIAN ASSISTANT

## 2022-11-26 PROCEDURE — 71275 CT ANGIOGRAPHY CHEST: CPT

## 2022-11-26 PROCEDURE — 99284 EMERGENCY DEPT VISIT MOD MDM: CPT

## 2022-11-26 PROCEDURE — 85025 COMPLETE CBC W/AUTO DIFF WBC: CPT | Performed by: PHYSICIAN ASSISTANT

## 2022-11-26 PROCEDURE — 80053 COMPREHEN METABOLIC PANEL: CPT | Performed by: PHYSICIAN ASSISTANT

## 2022-11-26 PROCEDURE — 85379 FIBRIN DEGRADATION QUANT: CPT | Performed by: PHYSICIAN ASSISTANT

## 2022-11-26 PROCEDURE — 25010000002 DIPHENHYDRAMINE PER 50 MG: Performed by: PHYSICIAN ASSISTANT

## 2022-11-26 PROCEDURE — 96374 THER/PROPH/DIAG INJ IV PUSH: CPT

## 2022-11-26 RX ORDER — DEXAMETHASONE SODIUM PHOSPHATE 10 MG/ML
10 INJECTION, SOLUTION INTRAMUSCULAR; INTRAVENOUS ONCE
Status: COMPLETED | OUTPATIENT
Start: 2022-11-26 | End: 2022-11-26

## 2022-11-26 RX ORDER — ASPIRIN 81 MG/1
324 TABLET, CHEWABLE ORAL ONCE
Status: DISCONTINUED | OUTPATIENT
Start: 2022-11-26 | End: 2022-11-27 | Stop reason: HOSPADM

## 2022-11-26 RX ORDER — DIPHENHYDRAMINE HYDROCHLORIDE 50 MG/ML
25 INJECTION INTRAMUSCULAR; INTRAVENOUS ONCE
Status: COMPLETED | OUTPATIENT
Start: 2022-11-26 | End: 2022-11-26

## 2022-11-26 RX ORDER — SODIUM CHLORIDE 0.9 % (FLUSH) 0.9 %
10 SYRINGE (ML) INJECTION AS NEEDED
Status: DISCONTINUED | OUTPATIENT
Start: 2022-11-26 | End: 2022-11-27 | Stop reason: HOSPADM

## 2022-11-26 RX ADMIN — DEXAMETHASONE SODIUM PHOSPHATE 10 MG: 10 INJECTION INTRAMUSCULAR; INTRAVENOUS at 23:11

## 2022-11-26 RX ADMIN — DIPHENHYDRAMINE HYDROCHLORIDE 25 MG: 50 INJECTION INTRAMUSCULAR; INTRAVENOUS at 23:10

## 2022-11-26 RX ADMIN — MORPHINE SULFATE 4 MG: 4 INJECTION, SOLUTION INTRAMUSCULAR; INTRAVENOUS at 23:12

## 2022-11-26 RX ADMIN — IOPAMIDOL 60 ML: 755 INJECTION, SOLUTION INTRAVENOUS at 23:27

## 2022-11-27 VITALS
RESPIRATION RATE: 18 BRPM | OXYGEN SATURATION: 97 % | HEIGHT: 68 IN | DIASTOLIC BLOOD PRESSURE: 87 MMHG | SYSTOLIC BLOOD PRESSURE: 129 MMHG | WEIGHT: 121 LBS | BODY MASS INDEX: 18.34 KG/M2 | HEART RATE: 77 BPM | TEMPERATURE: 97.5 F

## 2022-11-27 PROCEDURE — 96376 TX/PRO/DX INJ SAME DRUG ADON: CPT

## 2022-11-27 PROCEDURE — 96375 TX/PRO/DX INJ NEW DRUG ADDON: CPT

## 2022-11-27 PROCEDURE — 25010000002 MORPHINE PER 10 MG: Performed by: STUDENT IN AN ORGANIZED HEALTH CARE EDUCATION/TRAINING PROGRAM

## 2022-11-27 PROCEDURE — 25010000002 ONDANSETRON PER 1 MG: Performed by: PHYSICIAN ASSISTANT

## 2022-11-27 PROCEDURE — 25010000002 HEPARIN LOCK FLUSH PER 10 UNITS: Performed by: STUDENT IN AN ORGANIZED HEALTH CARE EDUCATION/TRAINING PROGRAM

## 2022-11-27 RX ORDER — MORPHINE SULFATE 2 MG/ML
2 INJECTION, SOLUTION INTRAMUSCULAR; INTRAVENOUS ONCE
Status: COMPLETED | OUTPATIENT
Start: 2022-11-27 | End: 2022-11-27

## 2022-11-27 RX ORDER — METHYLPREDNISOLONE 4 MG/1
TABLET ORAL
Qty: 21 TABLET | Refills: 0 | Status: SHIPPED | OUTPATIENT
Start: 2022-11-27

## 2022-11-27 RX ORDER — ONDANSETRON 2 MG/ML
4 INJECTION INTRAMUSCULAR; INTRAVENOUS ONCE
Status: COMPLETED | OUTPATIENT
Start: 2022-11-27 | End: 2022-11-27

## 2022-11-27 RX ORDER — HEPARIN SODIUM (PORCINE) LOCK FLUSH IV SOLN 100 UNIT/ML 100 UNIT/ML
300 SOLUTION INTRAVENOUS ONCE
Status: COMPLETED | OUTPATIENT
Start: 2022-11-27 | End: 2022-11-27

## 2022-11-27 RX ADMIN — ONDANSETRON 4 MG: 2 INJECTION INTRAMUSCULAR; INTRAVENOUS at 00:51

## 2022-11-27 RX ADMIN — Medication 300 UNITS: at 00:50

## 2022-11-27 RX ADMIN — MORPHINE SULFATE 2 MG: 2 INJECTION, SOLUTION INTRAMUSCULAR; INTRAVENOUS at 00:50

## 2022-12-06 ENCOUNTER — OFFICE VISIT (OUTPATIENT)
Dept: UROLOGY | Facility: CLINIC | Age: 47
End: 2022-12-06

## 2022-12-06 VITALS — BODY MASS INDEX: 18.34 KG/M2 | HEIGHT: 68 IN | WEIGHT: 121 LBS

## 2022-12-06 DIAGNOSIS — N23 RENAL COLIC: ICD-10-CM

## 2022-12-06 DIAGNOSIS — N35.028 OTHER POST-TRAUMATIC URETHRAL STRICTURE, FEMALE: ICD-10-CM

## 2022-12-06 DIAGNOSIS — Z48.816 AFTERCARE FOLLOWING SURGERY OF THE GENITOURINARY SYSTEM: Primary | ICD-10-CM

## 2022-12-06 PROCEDURE — 53661 DILATION OF URETHRA: CPT | Performed by: UROLOGY

## 2022-12-06 RX ORDER — GENTAMICIN SULFATE 40 MG/ML
80 INJECTION, SOLUTION INTRAMUSCULAR; INTRAVENOUS ONCE
Status: COMPLETED | OUTPATIENT
Start: 2022-12-06 | End: 2022-12-06

## 2022-12-06 RX ORDER — OXYCODONE AND ACETAMINOPHEN 10; 325 MG/1; MG/1
1 TABLET ORAL EVERY 6 HOURS PRN
Qty: 20 TABLET | Refills: 0 | Status: SHIPPED | OUTPATIENT
Start: 2022-12-06 | End: 2023-01-03 | Stop reason: SDUPTHER

## 2022-12-06 RX ADMIN — GENTAMICIN SULFATE 80 MG: 40 INJECTION, SOLUTION INTRAMUSCULAR; INTRAVENOUS at 13:02

## 2022-12-06 NOTE — PROGRESS NOTES
Chief Complaint:      Chief Complaint   Patient presents with   • Dilation        HPI:   47 y.o. female here for urethral dilation.  Her daughter was involved in a serious motor vehicle accident this morning.    Past Medical History:     Past Medical History:   Diagnosis Date   • Abdominal pain    • Abdominal swelling    • Hoyos's disease    • Bipolar 1 disorder (HCC)    • Brain tumor (benign) (HCC)    • Brain tumor (HCC)     R Frontal Lobe per pt   • Brain tumor (HCC) 2014   • Constipation    • DDD (degenerative disc disease), cervical 05/29/2017   • DDD (degenerative disc disease), cervical    • Diarrhea    • Fibromyalgia    • IBS (irritable bowel syndrome)    • Migraine    • Nausea & vomiting    • PONV (postoperative nausea and vomiting)    • PTSD (post-traumatic stress disorder)    • Rectal bleeding        Current Meds:     Current Outpatient Medications   Medication Sig Dispense Refill   • albuterol (PROVENTIL HFA;VENTOLIN HFA) 108 (90 Base) MCG/ACT inhaler Inhale 2 puffs 2 (Two) Times a Day.     • Azelastine HCl 137 MCG/SPRAY solution 1 spray into each nostril As Needed (Allergies).     • busPIRone (BUSPAR) 15 MG tablet Take 15 mg by mouth 3 (three) times a day        • ciprofloxacin (CIPRO) 500 MG tablet Take 1 tablet by mouth 2 (Two) Times a Day. 19 tablet 0   • diclofenac (VOLTAREN) 1 % gel gel      • divalproex (DEPAKOTE) 500 MG DR tablet Take 1 tablet by mouth 3 (Three) Times a Day. 90 tablet 2   • docusate sodium (COLACE) 100 MG capsule Take 1 capsule by mouth 2 (Two) Times a Day. 20 capsule 0   • docusate sodium (COLACE) 100 MG capsule Take 1 capsule by mouth 2 (Two) Times a Day As Needed for Constipation. 60 capsule 0   • fluticasone (VERAMYST) 27.5 MCG/SPRAY nasal spray 2 sprays into each nostril Daily.     • megestrol (MEGACE) 20 MG tablet Take 1 tablet by mouth Daily. 30 tablet 3   • methylPREDNISolone (MEDROL) 4 MG dose pack Take as directed on package instructions. 21 tablet 0   • metroNIDAZOLE  (FLAGYL) 500 MG tablet Take 1 tablet by mouth 3 (Three) Times a Day. 30 tablet 0   • montelukast (SINGULAIR) 10 MG tablet Take 10 mg by mouth Every Night.     • oxyCODONE-acetaminophen (Percocet)  MG per tablet Take 1 tablet by mouth Every 6 (Six) Hours As Needed for Moderate Pain. 20 tablet 0   • oxyCODONE-acetaminophen (PERCOCET) 5-325 MG per tablet Take 1 tablet by mouth Every 8 (Eight) Hours As Needed for Moderate Pain . 12 tablet 0   • pantoprazole (PROTONIX) 40 MG EC tablet Take 40 mg by mouth 2 (Two) Times a Day As Needed.     • phenazopyridine (PYRIDIUM) 100 MG tablet      • polyethylene glycol (MIRALAX) 17 GM/SCOOP powder Take 17 g by mouth Daily. 225 g 0   • promethazine (PHENERGAN) 25 MG tablet Take 1 tablet by mouth Every 6 (Six) Hours As Needed for Nausea or Vomiting. 21 tablet 2   • sertraline (ZOLOFT) 100 MG tablet Take 100 mg by mouth 2 (Two) Times a Day.     • SUMAtriptan (IMITREX) 6 MG/0.5ML injection Inject 6 mg under the skin 1 (One) Time.     • traMADol (ULTRAM) 50 MG tablet      • terazosin (HYTRIN) 2 MG capsule Take 1 capsule by mouth Every Night for 14 days. 14 capsule 0     No current facility-administered medications for this visit.        Allergies:      Allergies   Allergen Reactions   • Ativan [Lorazepam] Hallucinations     confusion   • Sulfa Antibiotics Shortness Of Breath and Swelling   • Sulfa Antibiotics Anaphylaxis   • Reglan [Metoclopramide] Angioedema   • Compazine [Prochlorperazine Edisylate] Hives   • Demerol [Meperidine] Hives   • Droperidol Itching   • Metoclopramide Swelling   • Toradol [Ketorolac Tromethamine] Hives and Itching   • Toradol [Ketorolac Tromethamine] Hives   • Zofran [Ondansetron Hcl] Rash   • Zosyn [Piperacillin Sod-Tazobactam So] Hives        Past Surgical History:     Past Surgical History:   Procedure Laterality Date   • ANAL SCOPE N/A 7/28/2016    Procedure: ANAL SCOPE;  Surgeon: Kael Lopez MD;  Location: [facility];  Service:    • APPENDECTOMY      • COLONOSCOPY N/A 2016    Procedure: COLONOSCOPY  CPTCODE:89085;  Surgeon: Jose Antonio Belle III, MD;  Location: [facility] OR;  Service:    • COLONOSCOPY N/A 2016    Procedure: COLONOSCOPY (99875) CPT;  Surgeon: Jose Antonio Belle III, MD;  Location: [facility] OR;  Service:    • CYSTOSCOPY RETROGRADE PYELOGRAM Bilateral 2017    Procedure: CYSTOSCOPY RETROGRADE PYELOGRAM;  Surgeon: Mu Blunt MD;  Location: [facility] OR;  Service:    • ENDOSCOPY N/A 2016    Procedure: ESOPHAGOGASTRODUODENOSCOPY WITH BIOPSY  CPTCODE:56835;  Surgeon: Jose Antonio Belle III, MD;  Location: [facility] OR;  Service:    • HEMORRHOIDECTOMY N/A 2016    Procedure: HEMORRHOID STAPLING;  Surgeon: Kael Lopez MD;  Location: [facility] OR;  Service:    • HYSTERECTOMY     • KNEE SURGERY     • LAPAROSCOPIC SALPINGOOPHERECTOMY     • PORTACATH PLACEMENT N/A 2017    Procedure: INSERTION OF PORTACATH;  Surgeon: Celso Arredondo MD;  Location: [facility] OR;  Service:    • SHOULDER SURGERY      3 times       Social History:     Social History     Socioeconomic History   • Marital status:    Tobacco Use   • Smoking status: Former     Packs/day: 1.00     Years: 20.00     Pack years: 20.00     Types: Cigarettes     Quit date: 2015     Years since quittin.1   • Smokeless tobacco: Never   Vaping Use   • Vaping Use: Never used   Substance and Sexual Activity   • Alcohol use: No   • Drug use: Yes     Types: Marijuana     Comment: for pain   • Sexual activity: Defer     Birth control/protection: Surgical       Family History:     Family History   Problem Relation Age of Onset   • Crohn's disease Other    • Hypertension Other    • Diabetes Other    • Irritable bowel syndrome Other    • No Known Problems Father    • No Known Problems Mother        Review of Systems:     Review of Systems   Constitutional: Negative.  Negative for activity change, appetite change, chills, diaphoresis, fatigue, fever and unexpected  weight change.   HENT: Negative for congestion, dental problem, drooling, ear discharge, ear pain, facial swelling, hearing loss, mouth sores, nosebleeds, postnasal drip, rhinorrhea, sinus pressure, sneezing, sore throat, tinnitus, trouble swallowing and voice change.    Eyes: Negative.  Negative for photophobia, pain, discharge, redness, itching and visual disturbance.   Respiratory: Negative.  Negative for apnea, cough, choking, chest tightness, shortness of breath, wheezing and stridor.    Cardiovascular: Negative.  Negative for chest pain, palpitations and leg swelling.   Gastrointestinal: Negative.  Negative for abdominal distention, abdominal pain, anal bleeding, blood in stool, constipation, diarrhea, nausea, rectal pain and vomiting.   Endocrine: Negative.  Negative for cold intolerance, heat intolerance, polydipsia, polyphagia and polyuria.   Genitourinary: Positive for difficulty urinating. Negative for dysuria, genital sores, menstrual problem, urgency and vaginal bleeding.   Musculoskeletal: Negative.  Negative for arthralgias, back pain, gait problem, joint swelling, myalgias, neck pain and neck stiffness.   Skin: Negative.  Negative for color change, pallor, rash and wound.   Allergic/Immunologic: Negative.  Negative for environmental allergies, food allergies and immunocompromised state.   Neurological: Negative.  Negative for dizziness, tremors, seizures, syncope, facial asymmetry, speech difficulty, weakness, light-headedness, numbness and headaches.   Hematological: Negative.  Negative for adenopathy. Does not bruise/bleed easily.   Psychiatric/Behavioral: Negative for agitation, behavioral problems, confusion, decreased concentration, dysphoric mood, hallucinations, self-injury, sleep disturbance and suicidal ideas. The patient is not nervous/anxious and is not hyperactive.    All other systems reviewed and are negative.      Physical Exam:     Physical Exam  Constitutional:       Appearance: She  is well-developed.   HENT:      Head: Normocephalic and atraumatic.      Right Ear: External ear normal.      Left Ear: External ear normal.   Eyes:      Conjunctiva/sclera: Conjunctivae normal.      Pupils: Pupils are equal, round, and reactive to light.   Cardiovascular:      Rate and Rhythm: Normal rate and regular rhythm.      Heart sounds: Normal heart sounds.   Pulmonary:      Effort: Pulmonary effort is normal.      Breath sounds: Normal breath sounds.   Abdominal:      General: Bowel sounds are normal. There is no distension.      Palpations: Abdomen is soft. There is no mass.      Tenderness: There is no abdominal tenderness. There is no guarding or rebound.   Genitourinary:     General: Normal vulva.      Vagina: No vaginal discharge.   Musculoskeletal:         General: Normal range of motion.   Skin:     General: Skin is warm and dry.   Neurological:      Mental Status: She is alert.      Deep Tendon Reflexes: Reflexes are normal and symmetric.   Psychiatric:         Behavior: Behavior normal.         Thought Content: Thought content normal.         Judgment: Judgment normal.         I have reviewed the following portions of the patient's history: Allergies, current medications, past family history, past medical history, past social history, past surgical history, problem list, and ROS and confirm it is accurate.    Recent Image (CT and/or KUB):      CT Abdomen and Pelvis: No results found for this or any previous visit.       CT Stone Protocol: Results for orders placed during the hospital encounter of 10/03/22    CT Abdomen Pelvis Stone Protocol    Narrative  EXAM:  CT Abdomen and Pelvis Without Intravenous Contrast    EXAM DATE:  10/3/2022 7:24 AM    CLINICAL HISTORY:  Flank pain, kidney stone suspected    TECHNIQUE:  Axial computed tomography images of the abdomen and pelvis without  intravenous contrast.  Sagittal and coronal reformatted images were  created and reviewed.  This CT exam was performed  using one or more of  the following dose reduction techniques:  automated exposure control,  adjustment of the mA and/or kV according to patient size, and/or use of  iterative reconstruction technique.    COMPARISON:  CT ABDOMEN PELVIS STONE PROTOCOL- dated 07/19/2022    FINDINGS:  LUNG BASES:  Unremarkable.  No mass.  No consolidation.    ABDOMEN:  LIVER:  Unremarkable.  GALLBLADDER AND BILE DUCTS:  Unremarkable.  No calcified stones.  No  ductal dilation.  PANCREAS:  Unremarkable.  No ductal dilation.  SPLEEN:  Unremarkable.  No splenomegaly.  ADRENALS:  Unremarkable.  No mass.  KIDNEYS AND URETERS:  Unremarkable.  No obstructing stones.  No  hydronephrosis.  STOMACH AND BOWEL:  Unremarkable.  No obstruction.  No mucosal  thickening.    PELVIS:  APPENDIX:  No findings to suggest acute appendicitis.  BLADDER:  Unremarkable.  No stones.  REPRODUCTIVE:  Unremarkable as visualized.    ABDOMEN and PELVIS:  INTRAPERITONEAL SPACE:  Unremarkable.  No free air.  No significant  fluid collection.  BONES/JOINTS:  L2 vertebral body compression deformity again noted.  No dislocation.  SOFT TISSUES:  Unremarkable.  VASCULATURE:  Atherosclerotic disease.  No abdominal aortic aneurysm.  LYMPH NODES:  Unremarkable.  No enlarged lymph nodes.    Impression  No acute findings in the abdomen or pelvis.    This report was finalized on 10/3/2022 8:15 AM by Dr. Rigoberto Huddleston MD.       KUB: Results for orders placed during the hospital encounter of 10/01/22    XR Abdomen KUB    Narrative  KUB, 10/1/2022    HISTORY:  47-year-old female in the ED with flank pain, hematuria.    TECHNIQUE:  AP lower abdomen and pelvis.    COMPARISON:  *  CT abdomen/pelvis, 7/19/2022.    FINDINGS:  No visible radiopaque renal or urinary tract calculi on this examination, or on the prior CT stone study.    Visualized bowel gas pattern is normal. Moderate volume stool throughout normal caliber colon.    Surgical clips in the region of the lower  rectum.    Impression  Negative KUB.    Signer Name: Sang Painting MD  Signed: 10/1/2022 3:55 PM  Workstation Name: [email or web address]  [facility]       Labs (past 3 months):      Admission on 11/26/2022, Discharged on 11/27/2022   Component Date Value Ref Range Status   • QT Interval 11/26/2022 392  ms Final   • QTC Interval 11/26/2022 446  ms Final   • Glucose 11/26/2022 110 (H)  65 - 99 mg/dL Final   • BUN 11/26/2022 7  6 - 20 mg/dL Final   • Creatinine 11/26/2022 0.63  0.57 - 1.00 mg/dL Final   • Sodium 11/26/2022 139  136 - 145 mmol/L Final   • Potassium 11/26/2022 3.5  3.5 - 5.2 mmol/L Final   • Chloride 11/26/2022 103  98 - 107 mmol/L Final   • CO2 11/26/2022 22.9  22.0 - 29.0 mmol/L Final   • Calcium 11/26/2022 9.0  8.6 - 10.5 mg/dL Final   • Total Protein 11/26/2022 6.6  6.0 - 8.5 g/dL Final   • Albumin 11/26/2022 4.11  3.50 - 5.20 g/dL Final   • ALT (SGPT) 11/26/2022 35 (H)  1 - 33 U/L Final   • AST (SGOT) 11/26/2022 34 (H)  1 - 32 U/L Final   • Alkaline Phosphatase 11/26/2022 69  39 - 117 U/L Final   • Total Bilirubin 11/26/2022 0.6  0.0 - 1.2 mg/dL Final   • Globulin 11/26/2022 2.5  gm/dL Final   • A/G Ratio 11/26/2022 1.7  g/dL Final   • BUN/Creatinine Ratio 11/26/2022 11.1  7.0 - 25.0 Final   • Anion Gap 11/26/2022 13.1  5.0 - 15.0 mmol/L Final   • eGFR 11/26/2022 110.3  >60.0 mL/min/1.73 Final    National Kidney Foundation and American Society of Nephrology (ASN) Task Force recommended calculation based on the Chronic Kidney Disease Epidemiology Collaboration (CKD-EPI) equation refit without adjustment for race.   • Troponin T 11/26/2022 <0.010  0.000 - 0.030 ng/mL Final   • Troponin T 11/26/2022 <0.010  0.000 - 0.030 ng/mL Final   • Extra Tube 11/26/2022 Hold for add-ons.   Final    Auto resulted.   • Extra Tube 11/26/2022 hold for add-on   Final    Auto resulted   • Extra Tube 11/26/2022 Hold for add-ons.   Final    Auto resulted.   • Extra Tube 11/26/2022 Hold for add-ons.    Final    Auto resulted   • WBC 11/26/2022 5.41  3.40 - 10.80 10*3/mm3 Final   • RBC 11/26/2022 4.82  3.77 - 5.28 10*6/mm3 Final   • Hemoglobin 11/26/2022 14.8  12.0 - 15.9 g/dL Final   • Hematocrit 11/26/2022 45.1  34.0 - 46.6 % Final   • MCV 11/26/2022 93.6  79.0 - 97.0 fL Final   • MCH 11/26/2022 30.7  26.6 - 33.0 pg Final   • MCHC 11/26/2022 32.8  31.5 - 35.7 g/dL Final   • RDW 11/26/2022 13.2  12.3 - 15.4 % Final   • RDW-SD 11/26/2022 45.0  37.0 - 54.0 fl Final   • MPV 11/26/2022 10.4  6.0 - 12.0 fL Final   • Platelets 11/26/2022 164  140 - 450 10*3/mm3 Final   • Neutrophil % 11/26/2022 57.5  42.7 - 76.0 % Final   • Lymphocyte % 11/26/2022 34.2  19.6 - 45.3 % Final   • Monocyte % 11/26/2022 6.1  5.0 - 12.0 % Final   • Eosinophil % 11/26/2022 1.8  0.3 - 6.2 % Final   • Basophil % 11/26/2022 0.2  0.0 - 1.5 % Final   • Immature Grans % 11/26/2022 0.2  0.0 - 0.5 % Final   • Neutrophils, Absolute 11/26/2022 3.11  1.70 - 7.00 10*3/mm3 Final   • Lymphocytes, Absolute 11/26/2022 1.85  0.70 - 3.10 10*3/mm3 Final   • Monocytes, Absolute 11/26/2022 0.33  0.10 - 0.90 10*3/mm3 Final   • Eosinophils, Absolute 11/26/2022 0.10  0.00 - 0.40 10*3/mm3 Final   • Basophils, Absolute 11/26/2022 0.01  0.00 - 0.20 10*3/mm3 Final   • Immature Grans, Absolute 11/26/2022 0.01  0.00 - 0.05 10*3/mm3 Final   • nRBC 11/26/2022 0.0  0.0 - 0.2 /100 WBC Final   • D-Dimer, Quantitative 11/26/2022 1.62 (C)  0.00 - 0.50 MCGFEU/mL Final   Admission on 10/03/2022, Discharged on 10/03/2022   Component Date Value Ref Range Status   • Color, UA 10/03/2022 Red (A)  Yellow, Straw Final    Dipstick results may be inaccurate due to color interference.    • Appearance, UA 10/03/2022 Turbid (A)  Clear Final   • pH, UA 10/03/2022 5.5  5.0 - 8.0 Final   • Specific Gravity, UA 10/03/2022 1.021  1.005 - 1.030 Final   • Glucose, UA 10/03/2022 Negative  Negative Final   • Ketones, UA 10/03/2022 Trace (A)  Negative Final   • Bilirubin, UA 10/03/2022 Small (1+) (A)   Negative Final   • Blood, UA 10/03/2022 Large (3+) (A)  Negative Final   • Protein, UA 10/03/2022 30 mg/dL (1+) (A)  Negative Final   • Leuk Esterase, UA 10/03/2022 Small (1+) (A)  Negative Final   • Nitrite, UA 10/03/2022 Negative  Negative Final   • Urobilinogen, UA 10/03/2022 1.0 E.U./dL  0.2 - 1.0 E.U./dL Final   • RBC, UA 10/03/2022 Too Numerous to Count (A)  None Seen, 0-2 /HPF Final   • WBC, UA 10/03/2022 3-5 (A)  None Seen, 0-2 /HPF Final    Urine culture not indicated.   • Bacteria, UA 10/03/2022 Trace (A)  None Seen /HPF Final   • Squamous Epithelial Cells, UA 10/03/2022 0-2  None Seen, 0-2 /HPF Final   • Hyaline Casts, UA 10/03/2022 None Seen  None Seen /LPF Final   • Methodology 10/03/2022 Manual Light Microscopy   Final   • Glucose 10/03/2022 110 (H)  65 - 99 mg/dL Final   • BUN 10/03/2022 9  6 - 20 mg/dL Final   • Creatinine 10/03/2022 0.58  0.57 - 1.00 mg/dL Final   • Sodium 10/03/2022 141  136 - 145 mmol/L Final   • Potassium 10/03/2022 3.7  3.5 - 5.2 mmol/L Final    Slight hemolysis detected by analyzer. Results may be affected.   • Chloride 10/03/2022 106  98 - 107 mmol/L Final   • CO2 10/03/2022 22.4  22.0 - 29.0 mmol/L Final   • Calcium 10/03/2022 9.5  8.6 - 10.5 mg/dL Final   • Total Protein 10/03/2022 7.0  6.0 - 8.5 g/dL Final   • Albumin 10/03/2022 4.58  3.50 - 5.20 g/dL Final   • ALT (SGPT) 10/03/2022 40 (H)  1 - 33 U/L Final   • AST (SGOT) 10/03/2022 36 (H)  1 - 32 U/L Final   • Alkaline Phosphatase 10/03/2022 72  39 - 117 U/L Final   • Total Bilirubin 10/03/2022 1.5 (H)  0.0 - 1.2 mg/dL Final   • Globulin 10/03/2022 2.4  gm/dL Final   • A/G Ratio 10/03/2022 1.9  g/dL Final   • BUN/Creatinine Ratio 10/03/2022 15.5  7.0 - 25.0 Final   • Anion Gap 10/03/2022 12.6  5.0 - 15.0 mmol/L Final   • eGFR 10/03/2022 112.5  >60.0 mL/min/1.73 Final    National Kidney Foundation and American Society of Nephrology (ASN) Task Force recommended calculation based on the Chronic Kidney Disease Epidemiology  Collaboration (CKD-EPI) equation refit without adjustment for race.   • WBC 10/03/2022 5.62  3.40 - 10.80 10*3/mm3 Final   • RBC 10/03/2022 4.81  3.77 - 5.28 10*6/mm3 Final   • Hemoglobin 10/03/2022 15.0  12.0 - 15.9 g/dL Final   • Hematocrit 10/03/2022 43.5  34.0 - 46.6 % Final   • MCV 10/03/2022 90.4  79.0 - 97.0 fL Final   • MCH 10/03/2022 31.2  26.6 - 33.0 pg Final   • MCHC 10/03/2022 34.5  31.5 - 35.7 g/dL Final   • RDW 10/03/2022 13.2  12.3 - 15.4 % Final   • RDW-SD 10/03/2022 43.3  37.0 - 54.0 fl Final   • MPV 10/03/2022 9.9  6.0 - 12.0 fL Final   • Platelets 10/03/2022 162  140 - 450 10*3/mm3 Final   • Neutrophil % 10/03/2022 66.5  42.7 - 76.0 % Final   • Lymphocyte % 10/03/2022 24.9  19.6 - 45.3 % Final   • Monocyte % 10/03/2022 6.8  5.0 - 12.0 % Final   • Eosinophil % 10/03/2022 1.4  0.3 - 6.2 % Final   • Basophil % 10/03/2022 0.2  0.0 - 1.5 % Final   • Immature Grans % 10/03/2022 0.2  0.0 - 0.5 % Final   • Neutrophils, Absolute 10/03/2022 3.74  1.70 - 7.00 10*3/mm3 Final   • Lymphocytes, Absolute 10/03/2022 1.40  0.70 - 3.10 10*3/mm3 Final   • Monocytes, Absolute 10/03/2022 0.38  0.10 - 0.90 10*3/mm3 Final   • Eosinophils, Absolute 10/03/2022 0.08  0.00 - 0.40 10*3/mm3 Final   • Basophils, Absolute 10/03/2022 0.01  0.00 - 0.20 10*3/mm3 Final   • Immature Grans, Absolute 10/03/2022 0.01  0.00 - 0.05 10*3/mm3 Final   • nRBC 10/03/2022 0.0  0.0 - 0.2 /100 WBC Final   Admission on 10/01/2022, Discharged on 10/01/2022   Component Date Value Ref Range Status   • Glucose 10/01/2022 88  65 - 99 mg/dL Final   • BUN 10/01/2022 6  6 - 20 mg/dL Final   • Creatinine 10/01/2022 0.57  0.57 - 1.00 mg/dL Final   • Sodium 10/01/2022 142  136 - 145 mmol/L Final   • Potassium 10/01/2022 4.3  3.5 - 5.2 mmol/L Final   • Chloride 10/01/2022 105  98 - 107 mmol/L Final   • CO2 10/01/2022 27.1  22.0 - 29.0 mmol/L Final   • Calcium 10/01/2022 9.4  8.6 - 10.5 mg/dL Final   • Total Protein 10/01/2022 6.9  6.0 - 8.5 g/dL Final   •  Albumin 10/01/2022 4.42  3.50 - 5.20 g/dL Final   • ALT (SGPT) 10/01/2022 41 (H)  1 - 33 U/L Final   • AST (SGOT) 10/01/2022 34 (H)  1 - 32 U/L Final   • Alkaline Phosphatase 10/01/2022 70  39 - 117 U/L Final   • Total Bilirubin 10/01/2022 0.8  0.0 - 1.2 mg/dL Final   • Globulin 10/01/2022 2.5  gm/dL Final   • A/G Ratio 10/01/2022 1.8  g/dL Final   • BUN/Creatinine Ratio 10/01/2022 10.5  7.0 - 25.0 Final   • Anion Gap 10/01/2022 9.9  5.0 - 15.0 mmol/L Final   • eGFR 10/01/2022 113.0  >60.0 mL/min/1.73 Final    National Kidney Foundation and American Society of Nephrology (ASN) Task Force recommended calculation based on the Chronic Kidney Disease Epidemiology Collaboration (CKD-EPI) equation refit without adjustment for race.   • Color, UA 10/01/2022 Red (A)  Yellow, Straw Final    Dipstick results may be inaccurate due to color interference.       • Appearance, UA 10/01/2022 Turbid (A)  Clear Final   • pH, UA 10/01/2022 8.5 (H)  5.0 - 8.0 Final   • Specific Gravity, UA 10/01/2022 1.010  1.005 - 1.030 Final   • Glucose, UA 10/01/2022 Negative  Negative Final   • Ketones, UA 10/01/2022 Negative  Negative Final   • Bilirubin, UA 10/01/2022 Negative  Negative Final   • Blood, UA 10/01/2022 Large (3+) (A)  Negative Final   • Protein, UA 10/01/2022 30 mg/dL (1+) (A)  Negative Final   • Leuk Esterase, UA 10/01/2022 Trace (A)  Negative Final   • Nitrite, UA 10/01/2022 Negative  Negative Final   • Urobilinogen, UA 10/01/2022 1.0 E.U./dL  0.2 - 1.0 E.U./dL Final   • WBC 10/01/2022 5.16  3.40 - 10.80 10*3/mm3 Final   • RBC 10/01/2022 4.73  3.77 - 5.28 10*6/mm3 Final   • Hemoglobin 10/01/2022 14.6  12.0 - 15.9 g/dL Final   • Hematocrit 10/01/2022 43.1  34.0 - 46.6 % Final   • MCV 10/01/2022 91.1  79.0 - 97.0 fL Final   • MCH 10/01/2022 30.9  26.6 - 33.0 pg Final   • MCHC 10/01/2022 33.9  31.5 - 35.7 g/dL Final   • RDW 10/01/2022 13.1  12.3 - 15.4 % Final   • RDW-SD 10/01/2022 43.5  37.0 - 54.0 fl Final   • MPV 10/01/2022 10.1   6.0 - 12.0 fL Final   • Platelets 10/01/2022 163  140 - 450 10*3/mm3 Final   • Neutrophil % 10/01/2022 61.6  42.7 - 76.0 % Final   • Lymphocyte % 10/01/2022 30.6  19.6 - 45.3 % Final   • Monocyte % 10/01/2022 6.2  5.0 - 12.0 % Final   • Eosinophil % 10/01/2022 1.2  0.3 - 6.2 % Final   • Basophil % 10/01/2022 0.2  0.0 - 1.5 % Final   • Immature Grans % 10/01/2022 0.2  0.0 - 0.5 % Final   • Neutrophils, Absolute 10/01/2022 3.18  1.70 - 7.00 10*3/mm3 Final   • Lymphocytes, Absolute 10/01/2022 1.58  0.70 - 3.10 10*3/mm3 Final   • Monocytes, Absolute 10/01/2022 0.32  0.10 - 0.90 10*3/mm3 Final   • Eosinophils, Absolute 10/01/2022 0.06  0.00 - 0.40 10*3/mm3 Final   • Basophils, Absolute 10/01/2022 0.01  0.00 - 0.20 10*3/mm3 Final   • Immature Grans, Absolute 10/01/2022 0.01  0.00 - 0.05 10*3/mm3 Final   • nRBC 10/01/2022 0.0  0.0 - 0.2 /100 WBC Final   • Extra Tube 10/01/2022 Hold for add-ons.   Final    Auto resulted.   • Extra Tube 10/01/2022 hold for add-on   Final    Auto resulted   • Extra Tube 10/01/2022 Hold for add-ons.   Final    Auto resulted.   • Extra Tube 10/01/2022 Hold for add-ons.   Final    Auto resulted   • RBC, UA 10/01/2022 Too Numerous to Count (A)  None Seen, 0-2 /HPF Final   • WBC, UA 10/01/2022 6-12 (A)  None Seen, 0-2 /HPF Final   • Bacteria, UA 10/01/2022 None Seen  None Seen /HPF Final   • Squamous Epithelial Cells, UA 10/01/2022 3-6 (A)  None Seen, 0-2 /HPF Final   • Yeast, UA 10/01/2022 Small/1+ Yeast  None Seen /HPF Final   • Hyaline Casts, UA 10/01/2022 3-6  None Seen /LPF Final   • Methodology 10/01/2022 Automated Microscopy   Final   Admission on 09/10/2022, Discharged on 09/10/2022   Component Date Value Ref Range Status   • COVID19 09/09/2022 Not Detected  Not Detected - Ref. Range Final   • Influenza A PCR 09/09/2022 Not Detected  Not Detected Final   • Influenza B PCR 09/09/2022 Not Detected  Not Detected Final        Procedure:   Urethral dilation-after an appropriate informed  consent, the patient was brought to the procedure suite.  The urethra was gently anesthetized with 10 mL of 2% viscous Xylocaine jelly.  After an adequate period of topical anesthesia I went ahead and dilated with [place] sounds from 16 to 26 [place] sequentially without complication. The patient was given gentamicin as prophylaxis with 80 mg.    Assessment/Plan:   Urethral stricture-status post dilation  Narcotic pain medication-patient has significant acute pain that I believe would be an indication for the use of narcotic pain medication.  I discussed the significant risks of pain medication and the fact that this will be a short only option and I will give her no more than a three-day supply of pain medication, I will not plan long-term medication, and that this will be sent to a pain clinic if it at all becomes necessary.  We discussed signing a pain medication agreement and the fact that we're going to run a state [place] review to be sure the patient is not getting pain medication from elsewhere.  If this is the case, we will not give pain medication as part of the patient's treatment plan of there being prescribed a controlled substance with potential for abuse.  This patient has been well aware of the appropriate dose of such medications including the risks for somnolence, limited ability to drive and/or safety and the significant potential for overdose.  It has been made clear that these medications are for the prescribed patient only without concomitant use of alcohol or other substance unless prescribed by the medical provider.  Has completed prescribing agreement detailing the terms of continue prescribing him a controlled substance including monitoring Luis Alberto reports, the possibility of urine drug screens, and pill counts.  The patient is aware that we review LUIS ALBERTO reports on a regular basis and scan them into the chart.  History and physical examination exhibited continued safe and appropriate use of  controlled substances. We also discussed the fact that the new [place] legislation allows only a three-day prescription for pain medication.  In this situation he will be referred to a chronic pain clinic.              This document has been electronically signed by VERENICE FINK MD December 6, 2022 12:44 EST    Dictated Utilizing Dragon Dictation: Part of this note may be an electronic transcription/translation of spoken language to printed text using the Dragon Dictation System.

## 2022-12-09 DIAGNOSIS — N23 RENAL COLIC: ICD-10-CM

## 2022-12-09 RX ORDER — PROMETHAZINE HYDROCHLORIDE 25 MG/1
TABLET ORAL
Qty: 21 TABLET | Refills: 5 | Status: SHIPPED | OUTPATIENT
Start: 2022-12-09 | End: 2023-01-17 | Stop reason: SDUPTHER

## 2022-12-28 ENCOUNTER — HOSPITAL ENCOUNTER (EMERGENCY)
Facility: HOSPITAL | Age: 47
Discharge: HOME | End: 2022-12-28
Attending: STUDENT IN AN ORGANIZED HEALTH CARE EDUCATION/TRAINING PROGRAM | Admitting: STUDENT IN AN ORGANIZED HEALTH CARE EDUCATION/TRAINING PROGRAM
Payer: COMMERCIAL

## 2022-12-28 VITALS
OXYGEN SATURATION: 98 % | DIASTOLIC BLOOD PRESSURE: 69 MMHG | HEART RATE: 61 BPM | WEIGHT: 121 LBS | HEIGHT: 68 IN | RESPIRATION RATE: 18 BRPM | TEMPERATURE: 98.7 F | SYSTOLIC BLOOD PRESSURE: 124 MMHG | BODY MASS INDEX: 18.34 KG/M2

## 2022-12-28 DIAGNOSIS — G43.009 MIGRAINE WITHOUT AURA AND WITHOUT STATUS MIGRAINOSUS, NOT INTRACTABLE: Primary | ICD-10-CM

## 2022-12-28 LAB
ALBUMIN SERPL-MCNC: 4 G/DL (ref 3.5–5.2)
ALBUMIN/GLOB SERPL: 1.7 G/DL
ALP SERPL-CCNC: 70 U/L (ref 39–117)
ALT SERPL W P-5'-P-CCNC: 31 U/L (ref 1–33)
ANION GAP SERPL CALCULATED.3IONS-SCNC: 9.2 MMOL/L (ref 5–15)
AST SERPL-CCNC: 28 U/L (ref 1–32)
BACTERIA UR QL AUTO: ABNORMAL /HPF
BASOPHILS # BLD AUTO: 0 10*3/MM3 (ref 0–0.2)
BASOPHILS NFR BLD AUTO: 0 % (ref 0–1.5)
BILIRUB SERPL-MCNC: 0.5 MG/DL (ref 0–1.2)
BILIRUB UR QL STRIP: NEGATIVE
BUN SERPL-MCNC: 4 MG/DL (ref 6–20)
BUN/CREAT SERPL: 6 (ref 7–25)
CALCIUM SPEC-SCNC: 8.9 MG/DL (ref 8.6–10.5)
CHLORIDE SERPL-SCNC: 103 MMOL/L (ref 98–107)
CLARITY UR: ABNORMAL
CO2 SERPL-SCNC: 26.8 MMOL/L (ref 22–29)
COLOR UR: ABNORMAL
CREAT SERPL-MCNC: 0.67 MG/DL (ref 0.57–1)
CRP SERPL-MCNC: <0.3 MG/DL (ref 0–0.5)
DEPRECATED RDW RBC AUTO: 45.6 FL (ref 37–54)
EGFRCR SERPLBLD CKD-EPI 2021: 108.6 ML/MIN/1.73
EOSINOPHIL # BLD AUTO: 0.01 10*3/MM3 (ref 0–0.4)
EOSINOPHIL NFR BLD AUTO: 0.4 % (ref 0.3–6.2)
ERYTHROCYTE [DISTWIDTH] IN BLOOD BY AUTOMATED COUNT: 13.4 % (ref 12.3–15.4)
ERYTHROCYTE [SEDIMENTATION RATE] IN BLOOD: 4 MM/HR (ref 0–20)
GLOBULIN UR ELPH-MCNC: 2.4 GM/DL
GLUCOSE SERPL-MCNC: 92 MG/DL (ref 65–99)
GLUCOSE UR STRIP-MCNC: NEGATIVE MG/DL
HCT VFR BLD AUTO: 42.9 % (ref 34–46.6)
HGB BLD-MCNC: 14.5 G/DL (ref 12–15.9)
HGB UR QL STRIP.AUTO: ABNORMAL
HOLD SPECIMEN: NORMAL
HOLD SPECIMEN: NORMAL
HYALINE CASTS UR QL AUTO: ABNORMAL /LPF
IMM GRANULOCYTES # BLD AUTO: 0 10*3/MM3 (ref 0–0.05)
IMM GRANULOCYTES NFR BLD AUTO: 0 % (ref 0–0.5)
KETONES UR QL STRIP: NEGATIVE
LEUKOCYTE ESTERASE UR QL STRIP.AUTO: ABNORMAL
LIPASE SERPL-CCNC: 55 U/L (ref 13–60)
LYMPHOCYTES # BLD AUTO: 0.52 10*3/MM3 (ref 0.7–3.1)
LYMPHOCYTES NFR BLD AUTO: 20.7 % (ref 19.6–45.3)
MCH RBC QN AUTO: 31.1 PG (ref 26.6–33)
MCHC RBC AUTO-ENTMCNC: 33.8 G/DL (ref 31.5–35.7)
MCV RBC AUTO: 92.1 FL (ref 79–97)
MONOCYTES # BLD AUTO: 0.35 10*3/MM3 (ref 0.1–0.9)
MONOCYTES NFR BLD AUTO: 13.9 % (ref 5–12)
NEUTROPHILS NFR BLD AUTO: 1.63 10*3/MM3 (ref 1.7–7)
NEUTROPHILS NFR BLD AUTO: 65 % (ref 42.7–76)
NITRITE UR QL STRIP: NEGATIVE
NRBC BLD AUTO-RTO: 0 /100 WBC (ref 0–0.2)
PH UR STRIP.AUTO: 6 [PH] (ref 5–8)
PLATELET # BLD AUTO: 113 10*3/MM3 (ref 140–450)
PMV BLD AUTO: 10.4 FL (ref 6–12)
POTASSIUM SERPL-SCNC: 3.4 MMOL/L (ref 3.5–5.2)
PROT SERPL-MCNC: 6.4 G/DL (ref 6–8.5)
PROT UR QL STRIP: ABNORMAL
RBC # BLD AUTO: 4.66 10*6/MM3 (ref 3.77–5.28)
RBC # UR STRIP: ABNORMAL /HPF
REF LAB TEST METHOD: ABNORMAL
SODIUM SERPL-SCNC: 139 MMOL/L (ref 136–145)
SP GR UR STRIP: 1.01 (ref 1–1.03)
SQUAMOUS #/AREA URNS HPF: ABNORMAL /HPF
UROBILINOGEN UR QL STRIP: ABNORMAL
WBC # UR STRIP: ABNORMAL /HPF
WBC NRBC COR # BLD: 2.51 10*3/MM3 (ref 3.4–10.8)
WHOLE BLOOD HOLD COAG: NORMAL
WHOLE BLOOD HOLD SPECIMEN: NORMAL

## 2022-12-28 PROCEDURE — 99283 EMERGENCY DEPT VISIT LOW MDM: CPT

## 2022-12-28 PROCEDURE — 36415 COLL VENOUS BLD VENIPUNCTURE: CPT

## 2022-12-28 PROCEDURE — 85025 COMPLETE CBC W/AUTO DIFF WBC: CPT | Performed by: NURSE PRACTITIONER

## 2022-12-28 PROCEDURE — 86140 C-REACTIVE PROTEIN: CPT | Performed by: NURSE PRACTITIONER

## 2022-12-28 PROCEDURE — 96372 THER/PROPH/DIAG INJ SC/IM: CPT

## 2022-12-28 PROCEDURE — 25010000002 BUTORPHANOL PER 1 MG: Performed by: STUDENT IN AN ORGANIZED HEALTH CARE EDUCATION/TRAINING PROGRAM

## 2022-12-28 PROCEDURE — 80053 COMPREHEN METABOLIC PANEL: CPT | Performed by: NURSE PRACTITIONER

## 2022-12-28 PROCEDURE — 81001 URINALYSIS AUTO W/SCOPE: CPT | Performed by: NURSE PRACTITIONER

## 2022-12-28 PROCEDURE — 25010000002 DEXAMETHASONE SODIUM PHOSPHATE 10 MG/ML SOLUTION: Performed by: STUDENT IN AN ORGANIZED HEALTH CARE EDUCATION/TRAINING PROGRAM

## 2022-12-28 PROCEDURE — 63710000001 DIPHENHYDRAMINE PER 50 MG: Performed by: PHYSICIAN ASSISTANT

## 2022-12-28 PROCEDURE — 85652 RBC SED RATE AUTOMATED: CPT | Performed by: NURSE PRACTITIONER

## 2022-12-28 PROCEDURE — 83690 ASSAY OF LIPASE: CPT | Performed by: NURSE PRACTITIONER

## 2022-12-28 RX ORDER — DIPHENHYDRAMINE HCL 50 MG
50 CAPSULE ORAL ONCE
Status: COMPLETED | OUTPATIENT
Start: 2022-12-28 | End: 2022-12-28

## 2022-12-28 RX ORDER — DEXAMETHASONE SODIUM PHOSPHATE 10 MG/ML
8 INJECTION, SOLUTION INTRAMUSCULAR; INTRAVENOUS ONCE
Status: COMPLETED | OUTPATIENT
Start: 2022-12-28 | End: 2022-12-28

## 2022-12-28 RX ADMIN — DIPHENHYDRAMINE HCL 50 MG: 50 CAPSULE ORAL at 21:52

## 2022-12-28 RX ADMIN — DEXAMETHASONE SODIUM PHOSPHATE 8 MG: 10 INJECTION INTRAMUSCULAR; INTRAVENOUS at 21:51

## 2022-12-28 RX ADMIN — BUTORPHANOL TARTRATE 2 MG: 2 INJECTION, SOLUTION INTRAMUSCULAR; INTRAVENOUS at 21:52

## 2022-12-28 NOTE — ED NOTES
MEDICAL SCREENING:    Reason for Visit: Headache, nausea, vomiting    Patient initially seen in triage.  The patient was advised further evaluation and diagnostic testing will be needed, some of the treatment and testing will be initiated in the lobby in order to begin the process.  The patient will be returned to the waiting area for the time being and possibly be re-assessed by a subsequent ED provider.  The patient will be brought back to the treatment area in as timely manner as possible.       Cele Figueroa, APRN  12/28/22 1852

## 2022-12-29 NOTE — ED PROVIDER NOTES
Subjective     History provided by:  Patient  Headache  Pain location:  Generalized  Quality:  Sharp and stabbing  Radiates to:  Does not radiate  Severity currently:  10/10  Severity at highest:  10/10  Onset quality:  Sudden  Duration:  1 day  Timing:  Constant  Progression:  Worsening  Chronicity:  Chronic  Similar to prior headaches: yes    Context: activity and bright light    Relieved by:  Nothing  Associated symptoms: no abdominal pain and no fever        Review of Systems   Constitutional: Negative.  Negative for fever.   Respiratory: Negative.    Cardiovascular: Negative.  Negative for chest pain.   Gastrointestinal: Negative.  Negative for abdominal pain.   Endocrine: Negative.    Genitourinary: Negative.  Negative for dysuria.   Skin: Negative.    Neurological: Positive for headaches.   Psychiatric/Behavioral: Negative.    All other systems reviewed and are negative.      Past Medical History:   Diagnosis Date   • Abdominal pain    • Abdominal swelling    • Hoyos's disease    • Bipolar 1 disorder (HCC)    • Brain tumor (benign) (HCC)    • Brain tumor (HCC)     R Frontal Lobe per pt   • Brain tumor (HCC) 2014   • Constipation    • DDD (degenerative disc disease), cervical 05/29/2017   • DDD (degenerative disc disease), cervical    • Diarrhea    • Fibromyalgia    • IBS (irritable bowel syndrome)    • Migraine    • Nausea & vomiting    • PONV (postoperative nausea and vomiting)    • PTSD (post-traumatic stress disorder)    • Rectal bleeding        Allergies   Allergen Reactions   • Ativan [Lorazepam] Hallucinations     confusion   • Sulfa Antibiotics Shortness Of Breath and Swelling   • Sulfa Antibiotics Anaphylaxis   • Reglan [Metoclopramide] Angioedema   • Compazine [Prochlorperazine Edisylate] Hives   • Demerol [Meperidine] Hives   • Droperidol Itching   • Metoclopramide Swelling   • Toradol [Ketorolac Tromethamine] Hives and Itching   • Toradol [Ketorolac Tromethamine] Hives   • Zofran [Ondansetron Hcl]  Rash   • Zosyn [Piperacillin Sod-Tazobactam So] Hives       Past Surgical History:   Procedure Laterality Date   • ANAL SCOPE N/A 2016    Procedure: ANAL SCOPE;  Surgeon: Kael Lopez MD;  Location: [facility] OR;  Service:    • APPENDECTOMY     • COLONOSCOPY N/A 2016    Procedure: COLONOSCOPY  CPTCODE:77544;  Surgeon: Jose Antonio Belle III, MD;  Location: [facility] OR;  Service:    • COLONOSCOPY N/A 2016    Procedure: COLONOSCOPY (26296) CPT;  Surgeon: Jose Antonio Belle III, MD;  Location: [facility] OR;  Service:    • CYSTOSCOPY RETROGRADE PYELOGRAM Bilateral 2017    Procedure: CYSTOSCOPY RETROGRADE PYELOGRAM;  Surgeon: Mu Blunt MD;  Location: [facility] OR;  Service:    • ENDOSCOPY N/A 2016    Procedure: ESOPHAGOGASTRODUODENOSCOPY WITH BIOPSY  CPTCODE:51258;  Surgeon: Jose Antonio Belle III, MD;  Location: [facility] OR;  Service:    • HEMORRHOIDECTOMY N/A 2016    Procedure: HEMORRHOID STAPLING;  Surgeon: Kael Lopez MD;  Location: [facility] OR;  Service:    • HYSTERECTOMY     • KNEE SURGERY     • LAPAROSCOPIC SALPINGOOPHERECTOMY     • PORTACATH PLACEMENT N/A 2017    Procedure: INSERTION OF PORTACATH;  Surgeon: Celso Arredondo MD;  Location: [facility] OR;  Service:    • SHOULDER SURGERY      3 times       Family History   Problem Relation Age of Onset   • Crohn's disease Other    • Hypertension Other    • Diabetes Other    • Irritable bowel syndrome Other    • No Known Problems Father    • No Known Problems Mother        Social History     Socioeconomic History   • Marital status:    Tobacco Use   • Smoking status: Former     Packs/day: 1.00     Years: 20.00     Pack years: 20.00     Types: Cigarettes     Quit date: 2015     Years since quittin.1   • Smokeless tobacco: Never   Vaping Use   • Vaping Use: Never used   Substance and Sexual Activity   • Alcohol use: No   • Drug use: Yes     Types: Marijuana     Comment: for pain   • Sexual activity: Defer      Birth control/protection: Surgical           Objective   Physical Exam  Vitals and nursing note reviewed.   Constitutional:       General: She is not in acute distress.     Appearance: She is well-developed. She is not diaphoretic.   HENT:      Head: Normocephalic and atraumatic.      Right Ear: External ear normal.      Left Ear: External ear normal.      Nose: Nose normal.   Eyes:      Conjunctiva/sclera: Conjunctivae normal.   Neck:      Vascular: No JVD.      Trachea: No tracheal deviation.   Cardiovascular:      Rate and Rhythm: Normal rate.      Heart sounds: No murmur heard.  Pulmonary:      Effort: Pulmonary effort is normal. No respiratory distress.      Breath sounds: No wheezing.   Abdominal:      Palpations: Abdomen is soft.      Tenderness: There is no abdominal tenderness.   Musculoskeletal:         General: No deformity. Normal range of motion.      Cervical back: Normal range of motion and neck supple.   Skin:     General: Skin is warm and dry.      Coloration: Skin is not pale.      Findings: No erythema or rash.   Neurological:      Mental Status: She is alert and oriented to person, place, and time.      Cranial Nerves: No cranial nerve deficit.   Psychiatric:         Behavior: Behavior normal.         Thought Content: Thought content normal.         Procedures           ED Course                                           Medical Decision Making  Migraine without aura and without status migrainosus, not intractable: chronic illness or injury  Amount and/or Complexity of Data Reviewed  Labs: ordered.      Risk  Prescription drug management.          Final diagnoses:   Migraine without aura and without status migrainosus, not intractable       ED Disposition  ED Disposition     ED Disposition   Discharge    Condition   Stable    Comment   --             Mabel Patterson, APRN  [address] [address]  [phone]    Schedule an appointment as soon as possible for a visit             Medication List      No changes were made to your prescriptions during this visit.          Hudson Suarez II, PA  12/29/22 0417

## 2023-01-01 NOTE — ED NOTES
"Went to dc pt, pt states her head is \"killing\" her and she is afraid she is going to get a migraine if she doesn't get something else for pain and is also asking for something else for nausea, provider was notified and is speaking to pt     Melva Garrison RN  06/04/18 7021    " no pain, swelling or deformity of joints

## 2023-01-03 ENCOUNTER — OFFICE VISIT (OUTPATIENT)
Dept: UROLOGY | Facility: CLINIC | Age: 48
End: 2023-01-03
Payer: COMMERCIAL

## 2023-01-03 VITALS — BODY MASS INDEX: 18.19 KG/M2 | HEIGHT: 68 IN | WEIGHT: 120 LBS

## 2023-01-03 DIAGNOSIS — N23 RENAL COLIC: ICD-10-CM

## 2023-01-03 DIAGNOSIS — N35.028 OTHER POST-TRAUMATIC URETHRAL STRICTURE, FEMALE: ICD-10-CM

## 2023-01-03 DIAGNOSIS — Z48.816 AFTERCARE FOLLOWING SURGERY OF THE GENITOURINARY SYSTEM: Primary | ICD-10-CM

## 2023-01-03 PROCEDURE — 53661 DILATION OF URETHRA: CPT | Performed by: UROLOGY

## 2023-01-03 RX ORDER — GENTAMICIN SULFATE 40 MG/ML
80 INJECTION, SOLUTION INTRAMUSCULAR; INTRAVENOUS ONCE
Status: COMPLETED | OUTPATIENT
Start: 2023-01-03 | End: 2023-01-03

## 2023-01-03 RX ORDER — OXYCODONE AND ACETAMINOPHEN 10; 325 MG/1; MG/1
1 TABLET ORAL EVERY 6 HOURS PRN
Qty: 20 TABLET | Refills: 0 | Status: SHIPPED | OUTPATIENT
Start: 2023-01-03 | End: 2023-01-24 | Stop reason: SDUPTHER

## 2023-01-03 RX ADMIN — GENTAMICIN SULFATE 80 MG: 40 INJECTION, SOLUTION INTRAMUSCULAR; INTRAVENOUS at 13:03

## 2023-01-03 NOTE — PROGRESS NOTES
Chief Complaint:      Chief Complaint   Patient presents with   • Dilation      Urethra stricture        HPI:   47 y.o. female here for dilation    Past Medical History:     Past Medical History:   Diagnosis Date   • Abdominal pain    • Abdominal swelling    • Hoyos's disease    • Bipolar 1 disorder (HCC)    • Brain tumor (benign) (HCC)    • Brain tumor (HCC)     R Frontal Lobe per pt   • Brain tumor (HCC) 2014   • Constipation    • DDD (degenerative disc disease), cervical 05/29/2017   • DDD (degenerative disc disease), cervical    • Diarrhea    • Fibromyalgia    • IBS (irritable bowel syndrome)    • Migraine    • Nausea & vomiting    • PONV (postoperative nausea and vomiting)    • PTSD (post-traumatic stress disorder)    • Rectal bleeding        Current Meds:     Current Outpatient Medications   Medication Sig Dispense Refill   • albuterol (PROVENTIL HFA;VENTOLIN HFA) 108 (90 Base) MCG/ACT inhaler Inhale 2 puffs 2 (Two) Times a Day.     • Azelastine HCl 137 MCG/SPRAY solution 1 spray into each nostril As Needed (Allergies).     • busPIRone (BUSPAR) 15 MG tablet Take 15 mg by mouth 3 (three) times a day        • ciprofloxacin (CIPRO) 500 MG tablet Take 1 tablet by mouth 2 (Two) Times a Day. 19 tablet 0   • diclofenac (VOLTAREN) 1 % gel gel      • divalproex (DEPAKOTE) 500 MG DR tablet Take 1 tablet by mouth 3 (Three) Times a Day. 90 tablet 2   • docusate sodium (COLACE) 100 MG capsule Take 1 capsule by mouth 2 (Two) Times a Day. 20 capsule 0   • docusate sodium (COLACE) 100 MG capsule Take 1 capsule by mouth 2 (Two) Times a Day As Needed for Constipation. 60 capsule 0   • fluticasone (VERAMYST) 27.5 MCG/SPRAY nasal spray 2 sprays into each nostril Daily.     • megestrol (MEGACE) 20 MG tablet Take 1 tablet by mouth Daily. 30 tablet 3   • methylPREDNISolone (MEDROL) 4 MG dose pack Take as directed on package instructions. 21 tablet 0   • metroNIDAZOLE (FLAGYL) 500 MG tablet Take 1 tablet by mouth 3 (Three) Times a  Day. 30 tablet 0   • montelukast (SINGULAIR) 10 MG tablet Take 10 mg by mouth Every Night.     • oxyCODONE-acetaminophen (Percocet)  MG per tablet Take 1 tablet by mouth Every 6 (Six) Hours As Needed for Moderate Pain. 20 tablet 0   • oxyCODONE-acetaminophen (PERCOCET) 5-325 MG per tablet Take 1 tablet by mouth Every 8 (Eight) Hours As Needed for Moderate Pain . 12 tablet 0   • pantoprazole (PROTONIX) 40 MG EC tablet Take 40 mg by mouth 2 (Two) Times a Day As Needed.     • phenazopyridine (PYRIDIUM) 100 MG tablet      • polyethylene glycol (MIRALAX) 17 GM/SCOOP powder Take 17 g by mouth Daily. 225 g 0   • promethazine (PHENERGAN) 25 MG tablet TAKE ONE TABLET BY MOUTH EVERY 6 HOURS AS NEEDED FOR NAUSEA AND VOMITING 21 tablet 5   • sertraline (ZOLOFT) 100 MG tablet Take 100 mg by mouth 2 (Two) Times a Day.     • SUMAtriptan (IMITREX) 6 MG/0.5ML injection Inject 6 mg under the skin 1 (One) Time.     • terazosin (HYTRIN) 2 MG capsule Take 1 capsule by mouth Every Night for 14 days. 14 capsule 0   • traMADol (ULTRAM) 50 MG tablet        No current facility-administered medications for this visit.        Allergies:      Allergies   Allergen Reactions   • Ativan [Lorazepam] Hallucinations     confusion   • Sulfa Antibiotics Shortness Of Breath and Swelling   • Sulfa Antibiotics Anaphylaxis   • Reglan [Metoclopramide] Angioedema   • Compazine [Prochlorperazine Edisylate] Hives   • Demerol [Meperidine] Hives   • Droperidol Itching   • Metoclopramide Swelling   • Toradol [Ketorolac Tromethamine] Hives and Itching   • Toradol [Ketorolac Tromethamine] Hives   • Zofran [Ondansetron Hcl] Rash   • Zosyn [Piperacillin Sod-Tazobactam So] Hives        Past Surgical History:     Past Surgical History:   Procedure Laterality Date   • ANAL SCOPE N/A 7/28/2016    Procedure: ANAL SCOPE;  Surgeon: Kael Lopez MD;  Location: [facility];  Service:    • APPENDECTOMY     • COLONOSCOPY N/A 6/30/2016    Procedure: COLONOSCOPY   CPTCODE:75069;  Surgeon: Jose Antonio Belle III, MD;  Location: [facility] OR;  Service:    • COLONOSCOPY N/A 2016    Procedure: COLONOSCOPY (27791) CPT;  Surgeon: Jose Antonio Belle III, MD;  Location: [facility] OR;  Service:    • CYSTOSCOPY RETROGRADE PYELOGRAM Bilateral 2017    Procedure: CYSTOSCOPY RETROGRADE PYELOGRAM;  Surgeon: Mu Blunt MD;  Location: [facility] OR;  Service:    • ENDOSCOPY N/A 2016    Procedure: ESOPHAGOGASTRODUODENOSCOPY WITH BIOPSY  CPTCODE:00144;  Surgeon: Jose Antonio Belle III, MD;  Location: [facility] OR;  Service:    • HEMORRHOIDECTOMY N/A 2016    Procedure: HEMORRHOID STAPLING;  Surgeon: Kael Lopez MD;  Location: [facility] OR;  Service:    • HYSTERECTOMY     • KNEE SURGERY     • LAPAROSCOPIC SALPINGOOPHERECTOMY     • PORTACATH PLACEMENT N/A 2017    Procedure: INSERTION OF PORTACATH;  Surgeon: Celso Arredondo MD;  [facility]: [facility];  Service:    • SHOULDER SURGERY      3 times       Social History:     Social History     Socioeconomic History   • Marital status:    Tobacco Use   • Smoking status: Former     Packs/day: 1.00     Years: 20.00     Pack years: 20.00     Types: Cigarettes     Quit date: 2015     Years since quittin.1   • Smokeless tobacco: Never   Vaping Use   • Vaping Use: Never used   Substance and Sexual Activity   • Alcohol use: No   • Drug use: Yes     Types: Marijuana     Comment: for pain   • Sexual activity: Defer     Birth control/protection: Surgical       Family History:     Family History   Problem Relation Age of Onset   • Crohn's disease Other    • Hypertension Other    • Diabetes Other    • Irritable bowel syndrome Other    • No Known Problems Father    • No Known Problems Mother        Review of Systems:     Review of Systems   Constitutional: Negative.  Negative for activity change, appetite change, chills, diaphoresis, fatigue and unexpected weight change.   HENT: Negative for congestion, dental problem,  drooling, ear discharge, ear pain, facial swelling, hearing loss, mouth sores, nosebleeds, postnasal drip, rhinorrhea, sinus pressure, sneezing, sore throat, tinnitus, trouble swallowing and voice change.    Eyes: Negative.  Negative for photophobia, pain, discharge, redness, itching and visual disturbance.   Respiratory: Negative.  Negative for apnea, cough, choking, chest tightness, shortness of breath, wheezing and stridor.    Cardiovascular: Negative.  Negative for chest pain, palpitations and leg swelling.   Gastrointestinal: Negative.  Negative for abdominal distention, abdominal pain, anal bleeding, blood in stool, constipation, diarrhea, nausea, rectal pain and vomiting.   Endocrine: Negative.  Negative for cold intolerance, heat intolerance, polydipsia, polyphagia and polyuria.   Genitourinary: Positive for difficulty urinating.   Musculoskeletal: Negative.  Negative for arthralgias, back pain, gait problem, joint swelling, myalgias, neck pain and neck stiffness.   Skin: Negative.  Negative for color change, pallor, rash and wound.   Allergic/Immunologic: Negative.  Negative for environmental allergies, food allergies and immunocompromised state.   Neurological: Negative.  Negative for dizziness, tremors, seizures, syncope, facial asymmetry, speech difficulty, weakness, light-headedness, numbness and headaches.   Hematological: Negative.  Negative for adenopathy. Does not bruise/bleed easily.   Psychiatric/Behavioral: Negative for agitation, behavioral problems, confusion, decreased concentration, dysphoric mood, hallucinations, self-injury, sleep disturbance and suicidal ideas. The patient is not nervous/anxious and is not hyperactive.    All other systems reviewed and are negative.      Physical Exam:     Physical Exam  Constitutional:       Appearance: She is well-developed.   HENT:      Head: Normocephalic and atraumatic.      Right Ear: External ear normal.      Left Ear: External ear normal.   Eyes:       Conjunctiva/sclera: Conjunctivae normal.      Pupils: Pupils are equal, round, and reactive to light.   Cardiovascular:      Rate and Rhythm: Normal rate and regular rhythm.      Heart sounds: Normal heart sounds.   Pulmonary:      Effort: Pulmonary effort is normal.      Breath sounds: Normal breath sounds.   Abdominal:      General: Bowel sounds are normal. There is no distension.      Palpations: Abdomen is soft. There is no mass.      Tenderness: There is no abdominal tenderness. There is no guarding or rebound.   Genitourinary:     General: Normal vulva.      Vagina: No vaginal discharge.   Musculoskeletal:         General: Normal range of motion.   Skin:     General: Skin is warm and dry.   Neurological:      Mental Status: She is alert.      Deep Tendon Reflexes: Reflexes are normal and symmetric.   Psychiatric:         Behavior: Behavior normal.         Thought Content: Thought content normal.         Judgment: Judgment normal.         I have reviewed the following portions of the patient's history: Allergies, current medications, past family history, past medical history, past social history, past surgical history, problem list, and ROS and confirm it is accurate.    Recent Image (CT and/or KUB):      CT Abdomen and Pelvis: No results found for this or any previous visit.       CT Stone Protocol: Results for orders placed during the hospital encounter of 10/03/22    CT Abdomen Pelvis Stone Protocol    Narrative  EXAM:  CT Abdomen and Pelvis Without Intravenous Contrast    EXAM DATE:  10/3/2022 7:24 AM    CLINICAL HISTORY:  Flank pain, kidney stone suspected    TECHNIQUE:  Axial computed tomography images of the abdomen and pelvis without  intravenous contrast.  Sagittal and coronal reformatted images were  created and reviewed.  This CT exam was performed using one or more of  the following dose reduction techniques:  automated exposure control,  adjustment of the mA and/or kV according to patient  size, and/or use of  iterative reconstruction technique.    COMPARISON:  CT ABDOMEN PELVIS STONE PROTOCOL- dated 07/19/2022    FINDINGS:  LUNG BASES:  Unremarkable.  No mass.  No consolidation.    ABDOMEN:  LIVER:  Unremarkable.  GALLBLADDER AND BILE DUCTS:  Unremarkable.  No calcified stones.  No  ductal dilation.  PANCREAS:  Unremarkable.  No ductal dilation.  SPLEEN:  Unremarkable.  No splenomegaly.  ADRENALS:  Unremarkable.  No mass.  KIDNEYS AND URETERS:  Unremarkable.  No obstructing stones.  No  hydronephrosis.  STOMACH AND BOWEL:  Unremarkable.  No obstruction.  No mucosal  thickening.    PELVIS:  APPENDIX:  No findings to suggest acute appendicitis.  BLADDER:  Unremarkable.  No stones.  REPRODUCTIVE:  Unremarkable as visualized.    ABDOMEN and PELVIS:  INTRAPERITONEAL SPACE:  Unremarkable.  No free air.  No significant  fluid collection.  BONES/JOINTS:  L2 vertebral body compression deformity again noted.  No dislocation.  SOFT TISSUES:  Unremarkable.  VASCULATURE:  Atherosclerotic disease.  No abdominal aortic aneurysm.  LYMPH NODES:  Unremarkable.  No enlarged lymph nodes.    Impression  No acute findings in the abdomen or pelvis.    This report was finalized on 10/3/2022 8:15 AM by Dr. Rigoberto Huddleston MD.       KUB: Results for orders placed during the hospital encounter of 10/01/22    XR Abdomen KUB    Narrative  KUB, 10/1/2022    HISTORY:  47-year-old female in the ED with flank pain, hematuria.    TECHNIQUE:  AP lower abdomen and pelvis.    COMPARISON:  *  CT abdomen/pelvis, 7/19/2022.    FINDINGS:  No visible radiopaque renal or urinary tract calculi on this examination, or on the prior CT stone study.    Visualized bowel gas pattern is normal. Moderate volume stool throughout normal caliber colon.    Surgical clips in the region of the lower rectum.    Impression  Negative KUB.    Signer Name: Sang Painting MD  Signed: 10/1/2022 3:55 PM  Workstation [facility]: [facility]  [facility]  [place]       Labs (past 3 months):      Admission on 11/26/2022, Discharged on 11/27/2022   Component Date Value Ref Range Status   • QT Interval 11/26/2022 392  ms Final   • QTC Interval 11/26/2022 446  ms Final   • Glucose 11/26/2022 110 (H)  65 - 99 mg/dL Final   • BUN 11/26/2022 7  6 - 20 mg/dL Final   • Creatinine 11/26/2022 0.63  0.57 - 1.00 mg/dL Final   • Sodium 11/26/2022 139  136 - 145 mmol/L Final   • Potassium 11/26/2022 3.5  3.5 - 5.2 mmol/L Final   • Chloride 11/26/2022 103  98 - 107 mmol/L Final   • CO2 11/26/2022 22.9  22.0 - 29.0 mmol/L Final   • Calcium 11/26/2022 9.0  8.6 - 10.5 mg/dL Final   • Total Protein 11/26/2022 6.6  6.0 - 8.5 g/dL Final   • Albumin 11/26/2022 4.11  3.50 - 5.20 g/dL Final   • ALT (SGPT) 11/26/2022 35 (H)  1 - 33 U/L Final   • AST (SGOT) 11/26/2022 34 (H)  1 - 32 U/L Final   • Alkaline Phosphatase 11/26/2022 69  39 - 117 U/L Final   • Total Bilirubin 11/26/2022 0.6  0.0 - 1.2 mg/dL Final   • Globulin 11/26/2022 2.5  gm/dL Final   • A/G Ratio 11/26/2022 1.7  g/dL Final   • BUN/Creatinine Ratio 11/26/2022 11.1  7.0 - 25.0 Final   • Anion Gap 11/26/2022 13.1  5.0 - 15.0 mmol/L Final   • eGFR 11/26/2022 110.3  >60.0 mL/min/1.73 Final    National Kidney Foundation and American Society of Nephrology (ASN) Task Force recommended calculation based on the Chronic Kidney Disease Epidemiology Collaboration (CKD-EPI) equation refit without adjustment for race.   • Troponin T 11/26/2022 <0.010  0.000 - 0.030 ng/mL Final   • Troponin T 11/26/2022 <0.010  0.000 - 0.030 ng/mL Final   • Extra Tube 11/26/2022 Hold for add-ons.   Final    Auto resulted.   • Extra Tube 11/26/2022 hold for add-on   Final    Auto resulted   • Extra Tube 11/26/2022 Hold for add-ons.   Final    Auto resulted.   • Extra Tube 11/26/2022 Hold for add-ons.   Final    Auto resulted   • WBC 11/26/2022 5.41  3.40 - 10.80 10*3/mm3 Final   • RBC 11/26/2022 4.82  3.77 - 5.28 10*6/mm3 Final   • Hemoglobin 11/26/2022 14.8   12.0 - 15.9 g/dL Final   • Hematocrit 11/26/2022 45.1  34.0 - 46.6 % Final   • MCV 11/26/2022 93.6  79.0 - 97.0 fL Final   • MCH 11/26/2022 30.7  26.6 - 33.0 pg Final   • MCHC 11/26/2022 32.8  31.5 - 35.7 g/dL Final   • RDW 11/26/2022 13.2  12.3 - 15.4 % Final   • RDW-SD 11/26/2022 45.0  37.0 - 54.0 fl Final   • MPV 11/26/2022 10.4  6.0 - 12.0 fL Final   • Platelets 11/26/2022 164  140 - 450 10*3/mm3 Final   • Neutrophil % 11/26/2022 57.5  42.7 - 76.0 % Final   • Lymphocyte % 11/26/2022 34.2  19.6 - 45.3 % Final   • Monocyte % 11/26/2022 6.1  5.0 - 12.0 % Final   • Eosinophil % 11/26/2022 1.8  0.3 - 6.2 % Final   • Basophil % 11/26/2022 0.2  0.0 - 1.5 % Final   • Immature Grans % 11/26/2022 0.2  0.0 - 0.5 % Final   • Neutrophils, Absolute 11/26/2022 3.11  1.70 - 7.00 10*3/mm3 Final   • Lymphocytes, Absolute 11/26/2022 1.85  0.70 - 3.10 10*3/mm3 Final   • Monocytes, Absolute 11/26/2022 0.33  0.10 - 0.90 10*3/mm3 Final   • Eosinophils, Absolute 11/26/2022 0.10  0.00 - 0.40 10*3/mm3 Final   • Basophils, Absolute 11/26/2022 0.01  0.00 - 0.20 10*3/mm3 Final   • Immature Grans, Absolute 11/26/2022 0.01  0.00 - 0.05 10*3/mm3 Final   • nRBC 11/26/2022 0.0  0.0 - 0.2 /100 WBC Final   • D-Dimer, Quantitative 11/26/2022 1.62 (C)  0.00 - 0.50 MCGFEU/mL Final        Procedure:   Urethral dilation-after an appropriate informed consent, the patient was brought to the procedure suite.  The urethra was gently anesthetized with 10 mL of 2% viscous Xylocaine jelly.  After an adequate period of topical anesthesia I went ahead and dilated with Novi sounds from 16 to 26 Lao sequentially without complication. The patient was given gentamicin as prophylaxis with 80 mg.    Assessment/Plan:   Posttraumatic urethral stricture status postdilatation  Narcotic pain medication-patient has significant acute pain that I believe would be an indication for the use of narcotic pain medication.  I discussed the significant risks of pain  medication and the fact that this will be a short only option and I will give her no more than a three-day supply of pain medication, I will not plan long-term medication, and that this will be sent to a pain clinic if it at all becomes necessary.  We discussed signing a pain medication agreement and the fact that we're going to run a state [place] review to be sure the patient is not getting pain medication from elsewhere.  If this is the case, we will not give pain medication as part of the patient's treatment plan of there being prescribed a controlled substance with potential for abuse.  This patient has been well aware of the appropriate dose of such medications including the risks for somnolence, limited ability to drive and/or safety and the significant potential for overdose.  It has been made clear that these medications are for the prescribed patient only without concomitant use of alcohol or other substance unless prescribed by the medical provider.  Has completed prescribing agreement detailing the terms of continue prescribing him a controlled substance including monitoring [place] Alberto reports, the possibility of urine drug screens, and pill counts.  The patient is aware that we review LUIS ALBERTO reports on a regular basis and scan them into the chart.  History and physical examination exhibited continued safe and appropriate use of controlled substances. We also discussed the fact that the new [place] legislation allows only a three-day prescription for pain medication.  In this situation he will be referred to a chronic pain clinic.              This document has been electronically signed by VERENICE FINK MD January 3, 2023 12:57 EST    Dictated Utilizing Dragon Dictation: Part of this note may be an electronic transcription/translation of spoken language to printed text using the Dragon Dictation System.

## 2023-01-10 ENCOUNTER — APPOINTMENT (OUTPATIENT)
Dept: GENERAL RADIOLOGY | Facility: HOSPITAL | Age: 48
End: 2023-01-10
Payer: COMMERCIAL

## 2023-01-10 ENCOUNTER — HOSPITAL ENCOUNTER (EMERGENCY)
Facility: HOSPITAL | Age: 48
Discharge: HOME | End: 2023-01-10
Attending: EMERGENCY MEDICINE | Admitting: EMERGENCY MEDICINE
Payer: COMMERCIAL

## 2023-01-10 ENCOUNTER — APPOINTMENT (OUTPATIENT)
Dept: CT IMAGING | Facility: HOSPITAL | Age: 48
End: 2023-01-10
Payer: COMMERCIAL

## 2023-01-10 VITALS
BODY MASS INDEX: 19.7 KG/M2 | SYSTOLIC BLOOD PRESSURE: 128 MMHG | DIASTOLIC BLOOD PRESSURE: 47 MMHG | OXYGEN SATURATION: 99 % | HEART RATE: 109 BPM | TEMPERATURE: 97.7 F | HEIGHT: 68 IN | RESPIRATION RATE: 18 BRPM | WEIGHT: 130 LBS

## 2023-01-10 DIAGNOSIS — K52.9 COLITIS: Primary | ICD-10-CM

## 2023-01-10 DIAGNOSIS — E86.0 DEHYDRATION: ICD-10-CM

## 2023-01-10 DIAGNOSIS — R31.9 HEMATURIA, UNSPECIFIED TYPE: ICD-10-CM

## 2023-01-10 DIAGNOSIS — N39.0 URINARY TRACT INFECTION IN FEMALE: ICD-10-CM

## 2023-01-10 LAB
ALBUMIN SERPL-MCNC: 3.8 G/DL (ref 3.5–5.2)
ALBUMIN/GLOB SERPL: 1.2 G/DL
ALP SERPL-CCNC: 66 U/L (ref 39–117)
ALT SERPL W P-5'-P-CCNC: 30 U/L (ref 1–33)
ANION GAP SERPL CALCULATED.3IONS-SCNC: 13.7 MMOL/L (ref 5–15)
AST SERPL-CCNC: 28 U/L (ref 1–32)
BACTERIA UR QL AUTO: ABNORMAL /HPF
BASOPHILS # BLD AUTO: 0.01 10*3/MM3 (ref 0–0.2)
BASOPHILS NFR BLD AUTO: 0.2 % (ref 0–1.5)
BILIRUB SERPL-MCNC: 0.6 MG/DL (ref 0–1.2)
BILIRUB UR QL STRIP: NEGATIVE
BUN SERPL-MCNC: 16 MG/DL (ref 6–20)
BUN/CREAT SERPL: 26.7 (ref 7–25)
CALCIUM SPEC-SCNC: 9.2 MG/DL (ref 8.6–10.5)
CHLORIDE SERPL-SCNC: 105 MMOL/L (ref 98–107)
CLARITY UR: ABNORMAL
CO2 SERPL-SCNC: 17.3 MMOL/L (ref 22–29)
COLOR UR: ABNORMAL
CREAT SERPL-MCNC: 0.6 MG/DL (ref 0.57–1)
CRP SERPL-MCNC: <0.3 MG/DL (ref 0–0.5)
DEPRECATED RDW RBC AUTO: 45.4 FL (ref 37–54)
EGFRCR SERPLBLD CKD-EPI 2021: 111.6 ML/MIN/1.73
EOSINOPHIL # BLD AUTO: 0.08 10*3/MM3 (ref 0–0.4)
EOSINOPHIL NFR BLD AUTO: 1.5 % (ref 0.3–6.2)
ERYTHROCYTE [DISTWIDTH] IN BLOOD BY AUTOMATED COUNT: 13.4 % (ref 12.3–15.4)
GLOBULIN UR ELPH-MCNC: 3.3 GM/DL
GLUCOSE SERPL-MCNC: 117 MG/DL (ref 65–99)
GLUCOSE UR STRIP-MCNC: NEGATIVE MG/DL
HCT VFR BLD AUTO: 46.9 % (ref 34–46.6)
HGB BLD-MCNC: 15.5 G/DL (ref 12–15.9)
HGB UR QL STRIP.AUTO: ABNORMAL
HOLD SPECIMEN: NORMAL
HOLD SPECIMEN: NORMAL
HYALINE CASTS UR QL AUTO: ABNORMAL /LPF
IMM GRANULOCYTES # BLD AUTO: 0.01 10*3/MM3 (ref 0–0.05)
IMM GRANULOCYTES NFR BLD AUTO: 0.2 % (ref 0–0.5)
KETONES UR QL STRIP: NEGATIVE
LEUKOCYTE ESTERASE UR QL STRIP.AUTO: ABNORMAL
LYMPHOCYTES # BLD AUTO: 1.79 10*3/MM3 (ref 0.7–3.1)
LYMPHOCYTES NFR BLD AUTO: 34.6 % (ref 19.6–45.3)
MCH RBC QN AUTO: 30.1 PG (ref 26.6–33)
MCHC RBC AUTO-ENTMCNC: 33 G/DL (ref 31.5–35.7)
MCV RBC AUTO: 91.1 FL (ref 79–97)
MONOCYTES # BLD AUTO: 0.33 10*3/MM3 (ref 0.1–0.9)
MONOCYTES NFR BLD AUTO: 6.4 % (ref 5–12)
NEUTROPHILS NFR BLD AUTO: 2.95 10*3/MM3 (ref 1.7–7)
NEUTROPHILS NFR BLD AUTO: 57.1 % (ref 42.7–76)
NITRITE UR QL STRIP: NEGATIVE
NRBC BLD AUTO-RTO: 0 /100 WBC (ref 0–0.2)
PH UR STRIP.AUTO: 6.5 [PH] (ref 5–8)
PLATELET # BLD AUTO: 193 10*3/MM3 (ref 140–450)
PMV BLD AUTO: 10.4 FL (ref 6–12)
POTASSIUM SERPL-SCNC: 3.9 MMOL/L (ref 3.5–5.2)
PROT SERPL-MCNC: 7.1 G/DL (ref 6–8.5)
PROT UR QL STRIP: ABNORMAL
RBC # BLD AUTO: 5.15 10*6/MM3 (ref 3.77–5.28)
RBC # UR STRIP: ABNORMAL /HPF
REF LAB TEST METHOD: ABNORMAL
SODIUM SERPL-SCNC: 136 MMOL/L (ref 136–145)
SP GR UR STRIP: 1.02 (ref 1–1.03)
SQUAMOUS #/AREA URNS HPF: ABNORMAL /HPF
UROBILINOGEN UR QL STRIP: ABNORMAL
WBC # UR STRIP: ABNORMAL /HPF
WBC NRBC COR # BLD: 5.17 10*3/MM3 (ref 3.4–10.8)
WHOLE BLOOD HOLD COAG: NORMAL
WHOLE BLOOD HOLD SPECIMEN: NORMAL

## 2023-01-10 PROCEDURE — 36415 COLL VENOUS BLD VENIPUNCTURE: CPT

## 2023-01-10 PROCEDURE — 74176 CT ABD & PELVIS W/O CONTRAST: CPT | Performed by: RADIOLOGY

## 2023-01-10 PROCEDURE — 81001 URINALYSIS AUTO W/SCOPE: CPT | Performed by: NURSE PRACTITIONER

## 2023-01-10 PROCEDURE — 99283 EMERGENCY DEPT VISIT LOW MDM: CPT

## 2023-01-10 PROCEDURE — 25010000002 HEPARIN LOCK FLUSH PER 10 UNITS: Performed by: EMERGENCY MEDICINE

## 2023-01-10 PROCEDURE — 96374 THER/PROPH/DIAG INJ IV PUSH: CPT

## 2023-01-10 PROCEDURE — 74018 RADEX ABDOMEN 1 VIEW: CPT

## 2023-01-10 PROCEDURE — 85025 COMPLETE CBC W/AUTO DIFF WBC: CPT | Performed by: NURSE PRACTITIONER

## 2023-01-10 PROCEDURE — 87086 URINE CULTURE/COLONY COUNT: CPT | Performed by: NURSE PRACTITIONER

## 2023-01-10 PROCEDURE — 80053 COMPREHEN METABOLIC PANEL: CPT | Performed by: NURSE PRACTITIONER

## 2023-01-10 PROCEDURE — 63710000001 PROMETHAZINE PER 25 MG: Performed by: NURSE PRACTITIONER

## 2023-01-10 PROCEDURE — 25010000002 HYDROMORPHONE 1 MG/ML SOLUTION: Performed by: NURSE PRACTITIONER

## 2023-01-10 PROCEDURE — 74018 RADEX ABDOMEN 1 VIEW: CPT | Performed by: RADIOLOGY

## 2023-01-10 PROCEDURE — 96376 TX/PRO/DX INJ SAME DRUG ADON: CPT

## 2023-01-10 PROCEDURE — 25010000002 HYDROMORPHONE PER 4 MG: Performed by: NURSE PRACTITIONER

## 2023-01-10 PROCEDURE — 86140 C-REACTIVE PROTEIN: CPT | Performed by: NURSE PRACTITIONER

## 2023-01-10 PROCEDURE — 74176 CT ABD & PELVIS W/O CONTRAST: CPT

## 2023-01-10 RX ORDER — HEPARIN SODIUM (PORCINE) LOCK FLUSH IV SOLN 100 UNIT/ML 100 UNIT/ML
300 SOLUTION INTRAVENOUS ONCE
Status: COMPLETED | OUTPATIENT
Start: 2023-01-10 | End: 2023-01-10

## 2023-01-10 RX ORDER — SODIUM CHLORIDE 0.9 % (FLUSH) 0.9 %
10 SYRINGE (ML) INJECTION AS NEEDED
Status: DISCONTINUED | OUTPATIENT
Start: 2023-01-10 | End: 2023-01-10 | Stop reason: HOSPADM

## 2023-01-10 RX ORDER — PROMETHAZINE HYDROCHLORIDE 25 MG/1
25 TABLET ORAL ONCE
Status: DISCONTINUED | OUTPATIENT
Start: 2023-01-10 | End: 2023-01-10 | Stop reason: HOSPADM

## 2023-01-10 RX ORDER — HYDROMORPHONE HYDROCHLORIDE 1 MG/ML
0.5 INJECTION, SOLUTION INTRAMUSCULAR; INTRAVENOUS; SUBCUTANEOUS ONCE
Status: COMPLETED | OUTPATIENT
Start: 2023-01-10 | End: 2023-01-10

## 2023-01-10 RX ORDER — PROMETHAZINE HYDROCHLORIDE 25 MG/1
25 TABLET ORAL ONCE
Status: COMPLETED | OUTPATIENT
Start: 2023-01-10 | End: 2023-01-10

## 2023-01-10 RX ORDER — NITROFURANTOIN 25; 75 MG/1; MG/1
100 CAPSULE ORAL 2 TIMES DAILY
Qty: 14 CAPSULE | Refills: 0 | Status: SHIPPED | OUTPATIENT
Start: 2023-01-10 | End: 2023-01-17 | Stop reason: SDUPTHER

## 2023-01-10 RX ADMIN — PROMETHAZINE HYDROCHLORIDE 25 MG: 25 TABLET ORAL at 14:55

## 2023-01-10 RX ADMIN — HYDROMORPHONE HYDROCHLORIDE 1 MG: 1 INJECTION, SOLUTION INTRAMUSCULAR; INTRAVENOUS; SUBCUTANEOUS at 16:32

## 2023-01-10 RX ADMIN — Medication 300 UNITS: at 16:32

## 2023-01-10 RX ADMIN — SODIUM CHLORIDE 1000 ML: 9 INJECTION, SOLUTION INTRAVENOUS at 14:55

## 2023-01-10 RX ADMIN — HYDROMORPHONE HYDROCHLORIDE 0.5 MG: 1 INJECTION, SOLUTION INTRAMUSCULAR; INTRAVENOUS; SUBCUTANEOUS at 14:53

## 2023-01-10 NOTE — ED PROVIDER NOTES
Subjective   History of Present Illness  Patient is a 47-year-old female with significant past medical history positive for migraines, PTSD, diarrhea, fibromyalgia, DDD, right frontal lobe brain tumor, bipolar disorder, abdominal pain presenting to the ER complaints of flank pain and hematuria.  Patient states her symptoms started a few days ago and worsening.  Patient denies dysuria or urinary frequency.  Patient denies fever, cough, nausea, vomiting, shortness of breath or any additional symptoms today.  Patient denies any alleviating or worsening factors.    History provided by:  Patient   used: No        Review of Systems   Constitutional: Negative.  Negative for fever.   HENT: Negative.    Respiratory: Negative.    Cardiovascular: Negative.  Negative for chest pain.   Gastrointestinal: Negative.  Negative for abdominal pain.   Endocrine: Negative.    Genitourinary: Positive for flank pain and hematuria. Negative for dysuria.   Skin: Negative.    Neurological: Negative.    Psychiatric/Behavioral: Negative.    All other systems reviewed and are negative.      Past Medical History:   Diagnosis Date   • Abdominal pain    • Abdominal swelling    • Hoyos's disease    • Bipolar 1 disorder (HCC)    • Brain tumor (benign) (HCC)    • Brain tumor (HCC)     R Frontal Lobe per pt   • Brain tumor (HCC) 2014   • Constipation    • DDD (degenerative disc disease), cervical 05/29/2017   • DDD (degenerative disc disease), cervical    • Diarrhea    • Fibromyalgia    • IBS (irritable bowel syndrome)    • Migraine    • Nausea & vomiting    • PONV (postoperative nausea and vomiting)    • PTSD (post-traumatic stress disorder)    • Rectal bleeding        Allergies   Allergen Reactions   • Ativan [Lorazepam] Hallucinations     confusion   • Sulfa Antibiotics Shortness Of Breath and Swelling   • Sulfa Antibiotics Anaphylaxis   • Reglan [Metoclopramide] Angioedema   • Compazine [Prochlorperazine Edisylate] Hives   •  Demerol [Meperidine] Hives   • Droperidol Itching   • Metoclopramide Swelling   • Toradol [Ketorolac Tromethamine] Hives and Itching   • Toradol [Ketorolac Tromethamine] Hives   • Zofran [Ondansetron Hcl] Rash   • Zosyn [Piperacillin Sod-Tazobactam So] Hives       Past Surgical History:   Procedure Laterality Date   • ANAL SCOPE N/A 2016    Procedure: ANAL SCOPE;  Surgeon: Kael Lopez MD;  Location: [facility] OR;  Service:    • APPENDECTOMY     • COLONOSCOPY N/A 2016    Procedure: COLONOSCOPY  CPTCODE:14384;  Surgeon: Jose Antonio Belle III, MD;  Location: [facility] OR;  Service:    • COLONOSCOPY N/A 2016    Procedure: COLONOSCOPY (67128) CPT;  Surgeon: Jose Antonio Belle III, MD;  Location: [facility] OR;  Service:    • CYSTOSCOPY RETROGRADE PYELOGRAM Bilateral 2017    Procedure: CYSTOSCOPY RETROGRADE PYELOGRAM;  Surgeon: Mu Blunt MD;  Location: [facility] OR;  Service:    • ENDOSCOPY N/A 2016    Procedure: ESOPHAGOGASTRODUODENOSCOPY WITH BIOPSY  CPTCODE:43650;  Surgeon: Jose Antonio Belle III, MD;  Location: [facility] OR;  Service:    • HEMORRHOIDECTOMY N/A 2016    Procedure: HEMORRHOID STAPLING;  Surgeon: Kael Lopez MD;  Location: [facility] OR;  Service:    • HYSTERECTOMY     • KNEE SURGERY     • LAPAROSCOPIC SALPINGOOPHERECTOMY     • PORTACATH PLACEMENT N/A 2017    Procedure: INSERTION OF PORTACATH;  Surgeon: Celso Arredondo MD;  Location: [facility] OR;  Service:    • SHOULDER SURGERY      3 times       Family History   Problem Relation Age of Onset   • Crohn's disease Other    • Hypertension Other    • Diabetes Other    • Irritable bowel syndrome Other    • No Known Problems Father    • No Known Problems Mother        Social History     Socioeconomic History   • Marital status:    Tobacco Use   • Smoking status: Former     Packs/day: 1.00     Years: 20.00     Pack years: 20.00     Types: Cigarettes     Quit date: 2015     Years since quittin.1   •  Smokeless tobacco: Never   Vaping Use   • Vaping Use: Never used   Substance and Sexual Activity   • Alcohol use: No   • Drug use: Yes     Types: Marijuana     Comment: for pain   • Sexual activity: Defer     Birth control/protection: Surgical           Objective   Physical Exam  Vitals and nursing note reviewed.   Constitutional:       General: She is not in acute distress.     Appearance: Normal appearance. She is well-developed and normal weight. She is not diaphoretic.   HENT:      Head: Normocephalic and atraumatic.      Right Ear: External ear normal.      Left Ear: External ear normal.      Nose: Nose normal.   Eyes:      Conjunctiva/sclera: Conjunctivae normal.      Pupils: Pupils are equal, round, and reactive to light.   Neck:      Vascular: No JVD.      Trachea: No tracheal deviation.   Cardiovascular:      Rate and Rhythm: Normal rate and regular rhythm.      Heart sounds: Normal heart sounds. No murmur heard.  Pulmonary:      Effort: Pulmonary effort is normal. No respiratory distress.      Breath sounds: Normal breath sounds. No wheezing.   Abdominal:      General: Bowel sounds are normal.      Palpations: Abdomen is soft.      Tenderness: There is no abdominal tenderness. There is no right CVA tenderness or left CVA tenderness.   Musculoskeletal:         General: No deformity. Normal range of motion.      Cervical back: Normal range of motion and neck supple.   Skin:     General: Skin is warm and dry.      Capillary Refill: Capillary refill takes less than 2 seconds.      Coloration: Skin is not pale.      Findings: No erythema or rash.   Neurological:      General: No focal deficit present.      Mental Status: She is alert and oriented to person, place, and time. Mental status is at baseline.      Cranial Nerves: No cranial nerve deficit.   Psychiatric:         Mood and Affect: Mood normal.         Behavior: Behavior normal.         Thought Content: Thought content normal.         Judgment: Judgment  normal.         Procedures       CT Abdomen Pelvis Stone Protocol   Final Result   1.  Thickening of the transverse colon and right colon which may be in   part related to incomplete distention. Colitis not excluded.   2.  Mild constipation.   3.  Changes of prior hysterectomy and appendectomy.   4.  Probable mucus plugging of the right lower lobe distal bronchial   airway which is stable from previous. CT follow-up in 3 months   recommended.   5.  Other nonacute/incidental findings as detailed above.       This report was finalized on 1/10/2023 2:21 PM by Dr. Ankit Naik MD.          XR Abdomen KUB   Final Result   1.  No acute findings radiographically evident.   2.  No radiographic evidence of renal or ureteral stones.       This report was finalized on 1/10/2023 12:47 PM by Dr. Ankit Naik MD.            Results for orders placed or performed during the hospital encounter of 01/10/23   Comprehensive Metabolic Panel    Specimen: Hand, Right; Blood   Result Value Ref Range    Glucose 117 (H) 65 - 99 mg/dL    BUN 16 6 - 20 mg/dL    Creatinine 0.60 0.57 - 1.00 mg/dL    Sodium 136 136 - 145 mmol/L    Potassium 3.9 3.5 - 5.2 mmol/L    Chloride 105 98 - 107 mmol/L    CO2 17.3 (L) 22.0 - 29.0 mmol/L    Calcium 9.2 8.6 - 10.5 mg/dL    Total Protein 7.1 6.0 - 8.5 g/dL    Albumin 3.8 3.5 - 5.2 g/dL    ALT (SGPT) 30 1 - 33 U/L    AST (SGOT) 28 1 - 32 U/L    Alkaline Phosphatase 66 39 - 117 U/L    Total Bilirubin 0.6 0.0 - 1.2 mg/dL    Globulin 3.3 gm/dL    A/G Ratio 1.2 g/dL    BUN/Creatinine Ratio 26.7 (H) 7.0 - 25.0    Anion Gap 13.7 5.0 - 15.0 mmol/L    eGFR 111.6 >60.0 mL/min/1.73   Urinalysis With Culture If Indicated - Urine, Clean Catch    Specimen: Urine, Clean Catch   Result Value Ref Range    Color, UA Red (A) Yellow, Straw    Appearance, UA Cloudy (A) Clear    pH, UA 6.5 5.0 - 8.0    Specific Gravity, UA 1.022 1.005 - 1.030    Glucose, UA Negative Negative    Ketones, UA Negative Negative    Bilirubin, UA  Negative Negative    Blood, UA Large (3+) (A) Negative    Protein, UA 30 mg/dL (1+) (A) Negative    Leuk Esterase, UA Small (1+) (A) Negative    Nitrite, UA Negative Negative    Urobilinogen, UA 1.0 E.U./dL 0.2 - 1.0 E.U./dL   C-reactive Protein    Specimen: Hand, Right; Blood   Result Value Ref Range    C-Reactive Protein <0.30 0.00 - 0.50 mg/dL   CBC Auto Differential    Specimen: Hand, Right; Blood   Result Value Ref Range    WBC 5.17 3.40 - 10.80 10*3/mm3    RBC 5.15 3.77 - 5.28 10*6/mm3    Hemoglobin 15.5 12.0 - 15.9 g/dL    Hematocrit 46.9 (H) 34.0 - 46.6 %    MCV 91.1 79.0 - 97.0 fL    MCH 30.1 26.6 - 33.0 pg    MCHC 33.0 31.5 - 35.7 g/dL    RDW 13.4 12.3 - 15.4 %    RDW-SD 45.4 37.0 - 54.0 fl    MPV 10.4 6.0 - 12.0 fL    Platelets 193 140 - 450 10*3/mm3    Neutrophil % 57.1 42.7 - 76.0 %    Lymphocyte % 34.6 19.6 - 45.3 %    Monocyte % 6.4 5.0 - 12.0 %    Eosinophil % 1.5 0.3 - 6.2 %    Basophil % 0.2 0.0 - 1.5 %    Immature Grans % 0.2 0.0 - 0.5 %    Neutrophils, Absolute 2.95 1.70 - 7.00 10*3/mm3    Lymphocytes, Absolute 1.79 0.70 - 3.10 10*3/mm3    Monocytes, Absolute 0.33 0.10 - 0.90 10*3/mm3    Eosinophils, Absolute 0.08 0.00 - 0.40 10*3/mm3    Basophils, Absolute 0.01 0.00 - 0.20 10*3/mm3    Immature Grans, Absolute 0.01 0.00 - 0.05 10*3/mm3    nRBC 0.0 0.0 - 0.2 /100 WBC   Urinalysis, Microscopic Only - Urine, Clean Catch    Specimen: Urine, Clean Catch   Result Value Ref Range    RBC, UA Too Numerous to Count (A) None Seen, 0-2 /HPF    WBC, UA 6-12 (A) None Seen, 0-2 /HPF    Bacteria, UA None Seen None Seen /HPF    Squamous Epithelial Cells, UA 3-6 (A) None Seen, 0-2 /HPF    Hyaline Casts, UA None Seen None Seen /LPF    Methodology Automated Microscopy    Green Top (Gel)   Result Value Ref Range    Extra Tube Hold for add-ons.    Lavender Top   Result Value Ref Range    Extra Tube hold for add-on    Gold Top - SST   Result Value Ref Range    Extra Tube Hold for add-ons.    Light Blue Top   Result  Value Ref Range    Extra Tube Hold for add-ons.        ED Course  ED Course as of 01/10/23 1504   Tue Malik 10, 2023   1342 XR Abdomen KUB [SM]   1444 CT Abdomen Pelvis Stone Protocol  IMPRESSION:  1.  Thickening of the transverse colon and right colon which may be in  part related to incomplete distention. Colitis not excluded.  2.  Mild constipation.  3.  Changes of prior hysterectomy and appendectomy.  4.  Probable mucus plugging of the right lower lobe distal bronchial  airway which is stable from previous. CT follow-up in 3 months  recommended.  5.  Other nonacute/incidental findings as detailed above.     This report was finalized on 1/10/2023 2:21 PM by Dr. Ankit Naik MD [SM]   1445 CO2(!): 17.3 [SM]   1449 Probable mucus plugging of the right lower lobe distal bronchial  airway which is stable from previous. CT follow-up in 3 months      Discussed with patient she verbalized understanding.  [SM]   2759 Work up and results were discussed throughly with the patients.  The patient will be discharged for further monitoring and management with their PCP.  Red flags, warning signs, worsening symptoms, and when to return to the ER discussed with and understood by the patients.  Patient will follow up with their PCP in a timely manner.  Vitals stable at discharge.  [SM]      ED Course User Index  [SM] Cele Figueroa APRN                                           Medical Decision Making  Patient is a 47-year-old female with significant past medical history positive for migraines, PTSD, diarrhea, fibromyalgia, DDD, right frontal lobe brain tumor, bipolar disorder, abdominal pain presenting to the ER complaints of flank pain and hematuria.  Patient states her symptoms started a few days ago and worsening.  Patient denies dysuria or urinary frequency.  Patient denies fever, cough, nausea, vomiting, shortness of breath or any additional symptoms today.  Patient denies any alleviating or worsening  factors.    Colitis: complicated acute illness or injury  Dehydration: complicated acute illness or injury  Hematuria, unspecified type: complicated acute illness or injury  Urinary tract infection in female: complicated acute illness or injury  Amount and/or Complexity of Data Reviewed  Labs: ordered. Decision-making details documented in ED Course.  Radiology: ordered. Decision-making details documented in ED Course.      Risk  Prescription drug management.          Final diagnoses:   Colitis   Hematuria, unspecified type   Dehydration   Urinary tract infection in female       ED Disposition  ED Disposition     ED Disposition   Discharge    Condition   Stable    Comment   --             Mabel Patterson, APRN  [address]  [facility] [address]  [place] [address]  [phone]    Schedule an appointment as soon as possible for a visit in 2 days           Medication List      New Prescriptions    nitrofurantoin (macrocrystal-monohydrate) 100 MG capsule  Commonly known as: MACROBID  Take 1 capsule by mouth 2 (Two) Times a Day for 7 days.           Where to Get Your Medications      These medications were sent to [address] [phone] [facility] [phone] [place] [address]    Phone: [phone]   · nitrofurantoin (macrocrystal-monohydrate) 100 MG capsule          Cele Figueroa, APRN  01/10/23 1508

## 2023-01-10 NOTE — ED NOTES
MEDICAL SCREENING:    Reason for Visit: Flank Pain    Patient initially seen in triage.  The patient was advised further evaluation and diagnostic testing will be needed, some of the treatment and testing will be initiated in the lobby in order to begin the process.  The patient will be returned to the waiting area for the time being and possibly be re-assessed by a subsequent ED provider.  The patient will be brought back to the treatment area in as timely manner as possible.       Cele Figueroa, APRN  01/10/23 1207

## 2023-01-10 NOTE — Clinical Note
[facility] EMERGENCY DEPARTMENT  [address] [facility] [address]  Phone: [phone]    Susanna Camilo was seen and treated in our emergency department on 1/10/2023.  She may return to work on 01/12/2023.         Thank you for choosing [facility].    Cele Figueroa, MIGDALIA

## 2023-01-10 NOTE — Clinical Note
[facility] EMERGENCY DEPARTMENT  [address] [facility] [address]  Phone: [phone]    Susanna Camilo was seen and treated in our emergency department on 1/10/2023.  She may return to work on 01/12/2023.         Thank you for choosing [facility].    Cele Figueroa, MIGDALIA

## 2023-01-11 LAB — BACTERIA SPEC AEROBE CULT: NO GROWTH

## 2023-01-14 ENCOUNTER — HOSPITAL ENCOUNTER (EMERGENCY)
Facility: HOSPITAL | Age: 48
Discharge: HOME | End: 2023-01-14
Attending: STUDENT IN AN ORGANIZED HEALTH CARE EDUCATION/TRAINING PROGRAM | Admitting: STUDENT IN AN ORGANIZED HEALTH CARE EDUCATION/TRAINING PROGRAM
Payer: COMMERCIAL

## 2023-01-14 VITALS
TEMPERATURE: 98.3 F | BODY MASS INDEX: 19.7 KG/M2 | OXYGEN SATURATION: 99 % | RESPIRATION RATE: 18 BRPM | HEIGHT: 68 IN | HEART RATE: 103 BPM | WEIGHT: 130 LBS | SYSTOLIC BLOOD PRESSURE: 122 MMHG | DIASTOLIC BLOOD PRESSURE: 79 MMHG

## 2023-01-14 DIAGNOSIS — R31.9 HEMATURIA, UNSPECIFIED TYPE: Primary | ICD-10-CM

## 2023-01-14 DIAGNOSIS — N23 RENAL COLIC ON LEFT SIDE: ICD-10-CM

## 2023-01-14 LAB
ALBUMIN SERPL-MCNC: 4.3 G/DL (ref 3.5–5.2)
ALBUMIN/GLOB SERPL: 1.6 G/DL
ALP SERPL-CCNC: 80 U/L (ref 39–117)
ALT SERPL W P-5'-P-CCNC: 29 U/L (ref 1–33)
ANION GAP SERPL CALCULATED.3IONS-SCNC: 7.5 MMOL/L (ref 5–15)
AST SERPL-CCNC: 24 U/L (ref 1–32)
BACTERIA UR QL AUTO: ABNORMAL /HPF
BASOPHILS # BLD AUTO: 0.02 10*3/MM3 (ref 0–0.2)
BASOPHILS NFR BLD AUTO: 0.4 % (ref 0–1.5)
BILIRUB SERPL-MCNC: 0.6 MG/DL (ref 0–1.2)
BILIRUB UR QL STRIP: NEGATIVE
BUN SERPL-MCNC: 6 MG/DL (ref 6–20)
BUN/CREAT SERPL: 11.1 (ref 7–25)
CALCIUM SPEC-SCNC: 9.2 MG/DL (ref 8.6–10.5)
CHLORIDE SERPL-SCNC: 106 MMOL/L (ref 98–107)
CLARITY UR: ABNORMAL
CO2 SERPL-SCNC: 23.5 MMOL/L (ref 22–29)
COLOR UR: ABNORMAL
CREAT SERPL-MCNC: 0.54 MG/DL (ref 0.57–1)
CRP SERPL-MCNC: <0.3 MG/DL (ref 0–0.5)
DEPRECATED RDW RBC AUTO: 43.8 FL (ref 37–54)
EGFRCR SERPLBLD CKD-EPI 2021: 114.4 ML/MIN/1.73
EOSINOPHIL # BLD AUTO: 0.06 10*3/MM3 (ref 0–0.4)
EOSINOPHIL NFR BLD AUTO: 1.1 % (ref 0.3–6.2)
ERYTHROCYTE [DISTWIDTH] IN BLOOD BY AUTOMATED COUNT: 13.1 % (ref 12.3–15.4)
GLOBULIN UR ELPH-MCNC: 2.7 GM/DL
GLUCOSE SERPL-MCNC: 96 MG/DL (ref 65–99)
GLUCOSE UR STRIP-MCNC: NEGATIVE MG/DL
HCT VFR BLD AUTO: 45.1 % (ref 34–46.6)
HGB BLD-MCNC: 15.2 G/DL (ref 12–15.9)
HGB UR QL STRIP.AUTO: ABNORMAL
HYALINE CASTS UR QL AUTO: ABNORMAL /LPF
IMM GRANULOCYTES # BLD AUTO: 0.01 10*3/MM3 (ref 0–0.05)
IMM GRANULOCYTES NFR BLD AUTO: 0.2 % (ref 0–0.5)
KETONES UR QL STRIP: NEGATIVE
LEUKOCYTE ESTERASE UR QL STRIP.AUTO: ABNORMAL
LIPASE SERPL-CCNC: 51 U/L (ref 13–60)
LYMPHOCYTES # BLD AUTO: 1.91 10*3/MM3 (ref 0.7–3.1)
LYMPHOCYTES NFR BLD AUTO: 33.5 % (ref 19.6–45.3)
MCH RBC QN AUTO: 30.7 PG (ref 26.6–33)
MCHC RBC AUTO-ENTMCNC: 33.7 G/DL (ref 31.5–35.7)
MCV RBC AUTO: 91.1 FL (ref 79–97)
MONOCYTES # BLD AUTO: 0.38 10*3/MM3 (ref 0.1–0.9)
MONOCYTES NFR BLD AUTO: 6.7 % (ref 5–12)
NEUTROPHILS NFR BLD AUTO: 3.32 10*3/MM3 (ref 1.7–7)
NEUTROPHILS NFR BLD AUTO: 58.1 % (ref 42.7–76)
NITRITE UR QL STRIP: NEGATIVE
NRBC BLD AUTO-RTO: 0 /100 WBC (ref 0–0.2)
PH UR STRIP.AUTO: 6.5 [PH] (ref 5–8)
PLATELET # BLD AUTO: 204 10*3/MM3 (ref 140–450)
PMV BLD AUTO: 10.3 FL (ref 6–12)
POTASSIUM SERPL-SCNC: 4.2 MMOL/L (ref 3.5–5.2)
PROT SERPL-MCNC: 7 G/DL (ref 6–8.5)
PROT UR QL STRIP: ABNORMAL
RBC # BLD AUTO: 4.95 10*6/MM3 (ref 3.77–5.28)
RBC # UR STRIP: ABNORMAL /HPF
REF LAB TEST METHOD: ABNORMAL
SODIUM SERPL-SCNC: 137 MMOL/L (ref 136–145)
SP GR UR STRIP: 1.01 (ref 1–1.03)
SQUAMOUS #/AREA URNS HPF: ABNORMAL /HPF
UROBILINOGEN UR QL STRIP: ABNORMAL
WBC # UR STRIP: ABNORMAL /HPF
WBC NRBC COR # BLD: 5.7 10*3/MM3 (ref 3.4–10.8)
YEAST URNS QL MICRO: ABNORMAL /HPF

## 2023-01-14 PROCEDURE — 96376 TX/PRO/DX INJ SAME DRUG ADON: CPT

## 2023-01-14 PROCEDURE — 86140 C-REACTIVE PROTEIN: CPT | Performed by: PHYSICIAN ASSISTANT

## 2023-01-14 PROCEDURE — 80053 COMPREHEN METABOLIC PANEL: CPT | Performed by: PHYSICIAN ASSISTANT

## 2023-01-14 PROCEDURE — 87086 URINE CULTURE/COLONY COUNT: CPT | Performed by: PHYSICIAN ASSISTANT

## 2023-01-14 PROCEDURE — 25010000002 MORPHINE PER 10 MG: Performed by: STUDENT IN AN ORGANIZED HEALTH CARE EDUCATION/TRAINING PROGRAM

## 2023-01-14 PROCEDURE — 99284 EMERGENCY DEPT VISIT MOD MDM: CPT

## 2023-01-14 PROCEDURE — 85025 COMPLETE CBC W/AUTO DIFF WBC: CPT | Performed by: PHYSICIAN ASSISTANT

## 2023-01-14 PROCEDURE — 25010000002 HEPARIN LOCK FLUSH PER 10 UNITS: Performed by: STUDENT IN AN ORGANIZED HEALTH CARE EDUCATION/TRAINING PROGRAM

## 2023-01-14 PROCEDURE — 63710000001 PROMETHAZINE PER 25 MG: Performed by: PHYSICIAN ASSISTANT

## 2023-01-14 PROCEDURE — 96374 THER/PROPH/DIAG INJ IV PUSH: CPT

## 2023-01-14 PROCEDURE — 83690 ASSAY OF LIPASE: CPT | Performed by: PHYSICIAN ASSISTANT

## 2023-01-14 PROCEDURE — 36415 COLL VENOUS BLD VENIPUNCTURE: CPT

## 2023-01-14 PROCEDURE — 81001 URINALYSIS AUTO W/SCOPE: CPT | Performed by: PHYSICIAN ASSISTANT

## 2023-01-14 RX ORDER — HEPARIN SODIUM (PORCINE) LOCK FLUSH IV SOLN 100 UNIT/ML 100 UNIT/ML
300 SOLUTION INTRAVENOUS ONCE
Status: COMPLETED | OUTPATIENT
Start: 2023-01-14 | End: 2023-01-14

## 2023-01-14 RX ORDER — PROMETHAZINE HYDROCHLORIDE 25 MG/1
25 TABLET ORAL ONCE
Status: COMPLETED | OUTPATIENT
Start: 2023-01-14 | End: 2023-01-14

## 2023-01-14 RX ORDER — SODIUM CHLORIDE 0.9 % (FLUSH) 0.9 %
10 SYRINGE (ML) INJECTION AS NEEDED
Status: DISCONTINUED | OUTPATIENT
Start: 2023-01-14 | End: 2023-01-14 | Stop reason: HOSPADM

## 2023-01-14 RX ADMIN — SODIUM CHLORIDE 1000 ML: 9 INJECTION, SOLUTION INTRAVENOUS at 14:50

## 2023-01-14 RX ADMIN — PROMETHAZINE HYDROCHLORIDE 25 MG: 25 TABLET ORAL at 14:53

## 2023-01-14 RX ADMIN — MORPHINE SULFATE 4 MG: 4 INJECTION, SOLUTION INTRAMUSCULAR; INTRAVENOUS at 14:53

## 2023-01-14 RX ADMIN — Medication 300 UNITS: at 16:46

## 2023-01-14 RX ADMIN — MORPHINE SULFATE 4 MG: 4 INJECTION, SOLUTION INTRAMUSCULAR; INTRAVENOUS at 16:38

## 2023-01-14 NOTE — Clinical Note
[facility] EMERGENCY DEPARTMENT  [address] [facility] [address]  Phone: [phone]    Susanna Camilo was seen and treated in our emergency department on 1/14/2023.  She may return to work on 01/15/2023.         Thank you for choosing [facility].    Anya Fuentes PA

## 2023-01-14 NOTE — ED PROVIDER NOTES
Subjective   History of Present Illness  This is a 47 year old female patient who presents to the ER with chief complaint of abdominal pain. PMH of kidney stones. Since 2 am, the patient has been complaining of left sided flank pain radiating into the back and LLQ of the abdomen. It has been severe, sharp and intermittent. She has associated dysuria, urinary frequency, nausea and vomiting. No fever, diarrhea, constipation. Just had her urethra dilated 11 days ago.         Review of Systems   Constitutional: Negative.  Negative for fever.   HENT: Negative.    Respiratory: Negative.    Cardiovascular: Negative.  Negative for chest pain.   Gastrointestinal: Positive for abdominal pain, nausea and vomiting. Negative for abdominal distention, anal bleeding, blood in stool, constipation, diarrhea and rectal pain.   Endocrine: Negative.    Genitourinary: Positive for dysuria and frequency. Negative for decreased urine volume, difficulty urinating, dyspareunia, enuresis, flank pain, genital sores, hematuria, menstrual problem, pelvic pain, urgency, vaginal bleeding, vaginal discharge and vaginal pain.   Skin: Negative.    Neurological: Negative.    Psychiatric/Behavioral: Negative.    All other systems reviewed and are negative.      Past Medical History:   Diagnosis Date   • Abdominal pain    • Abdominal swelling    • Hoyos's disease    • Bipolar 1 disorder (HCC)    • Brain tumor (benign) (HCC)    • Brain tumor (HCC)     R Frontal Lobe per pt   • Brain tumor (HCC) 2014   • Constipation    • DDD (degenerative disc disease), cervical 05/29/2017   • DDD (degenerative disc disease), cervical    • Diarrhea    • Fibromyalgia    • IBS (irritable bowel syndrome)    • Migraine    • Nausea & vomiting    • PONV (postoperative nausea and vomiting)    • PTSD (post-traumatic stress disorder)    • Rectal bleeding        Allergies   Allergen Reactions   • Ativan [Lorazepam] Hallucinations     confusion   • Sulfa Antibiotics Shortness Of  Breath and Swelling   • Sulfa Antibiotics Anaphylaxis   • Reglan [Metoclopramide] Angioedema   • Compazine [Prochlorperazine Edisylate] Hives   • Demerol [Meperidine] Hives   • Droperidol Itching   • Metoclopramide Swelling   • Toradol [Ketorolac Tromethamine] Hives and Itching   • Toradol [Ketorolac Tromethamine] Hives   • Zofran [Ondansetron Hcl] Rash   • Zosyn [Piperacillin Sod-Tazobactam So] Hives       Past Surgical History:   Procedure Laterality Date   • ANAL SCOPE N/A 7/28/2016    Procedure: ANAL SCOPE;  Surgeon: Kael Lopez MD;  Location: [facility] OR;  Service:    • APPENDECTOMY     • COLONOSCOPY N/A 6/30/2016    Procedure: COLONOSCOPY  CPTCODE:66830;  Surgeon: Jose Antonio Belle III, MD;  Location: [facility] OR;  Service:    • COLONOSCOPY N/A 7/7/2016    Procedure: COLONOSCOPY (63815) CPT;  Surgeon: Jose Antonio Belle III, MD;  Location: [facility] OR;  Service:    • CYSTOSCOPY RETROGRADE PYELOGRAM Bilateral 4/28/2017    Procedure: CYSTOSCOPY RETROGRADE PYELOGRAM;  Surgeon: Mu Blunt MD;  Location: [facility] OR;  Service:    • ENDOSCOPY N/A 6/30/2016    Procedure: ESOPHAGOGASTRODUODENOSCOPY WITH BIOPSY  CPTCODE:65189;  Surgeon: Jose Antonio Belle III, MD;  Location: [facility] OR;  Service:    • HEMORRHOIDECTOMY N/A 7/28/2016    Procedure: HEMORRHOID STAPLING;  Surgeon: Kael Lopez MD;  Location: [facility] OR;  Service:    • HYSTERECTOMY     • KNEE SURGERY     • LAPAROSCOPIC SALPINGOOPHERECTOMY     • PORTACATH PLACEMENT N/A 7/28/2017    Procedure: INSERTION OF PORTACATH;  Surgeon: Celso Arredondo MD;  Location: [facility] OR;  Service:    • SHOULDER SURGERY      3 times       Family History   Problem Relation Age of Onset   • Crohn's disease Other    • Hypertension Other    • Diabetes Other    • Irritable bowel syndrome Other    • No Known Problems Father    • No Known Problems Mother        Social History     Socioeconomic History   • Marital status:    Tobacco Use   • Smoking status: Former      Packs/day: 1.00     Years: 20.00     Pack years: 20.00     Types: Cigarettes     Quit date: 2015     Years since quittin.2   • Smokeless tobacco: Never   Vaping Use   • Vaping Use: Never used   Substance and Sexual Activity   • Alcohol use: No   • Drug use: Yes     Types: Marijuana     Comment: for pain   • Sexual activity: Defer     Birth control/protection: Surgical           Objective   Physical Exam  Vitals and nursing note reviewed.   Constitutional:       General: She is not in acute distress.     Appearance: She is well-developed. She is not diaphoretic.   HENT:      Head: Normocephalic and atraumatic.      Right Ear: External ear normal.      Left Ear: External ear normal.      Nose: Nose normal.   Eyes:      Conjunctiva/sclera: Conjunctivae normal.      Pupils: Pupils are equal, round, and reactive to light.   Neck:      Vascular: No JVD.      Trachea: No tracheal deviation.   Cardiovascular:      Rate and Rhythm: Normal rate and regular rhythm.      Heart sounds: Normal heart sounds. No murmur heard.  Pulmonary:      Effort: Pulmonary effort is normal. No respiratory distress.      Breath sounds: Normal breath sounds. No wheezing.   Abdominal:      General: Bowel sounds are normal.      Palpations: Abdomen is soft.      Tenderness: There is no abdominal tenderness. There is no right CVA tenderness or left CVA tenderness.   Musculoskeletal:         General: No deformity. Normal range of motion.      Cervical back: Normal range of motion and neck supple.   Skin:     General: Skin is warm and dry.      Coloration: Skin is not pale.      Findings: No erythema or rash.   Neurological:      Mental Status: She is alert and oriented to person, place, and time.      Cranial Nerves: No cranial nerve deficit.   Psychiatric:         Behavior: Behavior normal.         Thought Content: Thought content normal.         Procedures           ED Course  ED Course as of 23 1632   Sat 2023   1630  Patient feeling better. Will be d/c home to f/u with urology and PCP. Will follow urine culture to determine if antibiotics are needed since she already gets antibiotics once month per urology. Didn't do CT scan today since she has had so many of them and kidney function is normal.  [MM]      ED Course User Index  [MM] Anya Fuentes PA           Results for orders placed or performed during the hospital encounter of 01/14/23   Comprehensive Metabolic Panel    Specimen: Blood   Result Value Ref Range    Glucose 96 65 - 99 mg/dL    BUN 6 6 - 20 mg/dL    Creatinine 0.54 (L) 0.57 - 1.00 mg/dL    Sodium 137 136 - 145 mmol/L    Potassium 4.2 3.5 - 5.2 mmol/L    Chloride 106 98 - 107 mmol/L    CO2 23.5 22.0 - 29.0 mmol/L    Calcium 9.2 8.6 - 10.5 mg/dL    Total Protein 7.0 6.0 - 8.5 g/dL    Albumin 4.3 3.5 - 5.2 g/dL    ALT (SGPT) 29 1 - 33 U/L    AST (SGOT) 24 1 - 32 U/L    Alkaline Phosphatase 80 39 - 117 U/L    Total Bilirubin 0.6 0.0 - 1.2 mg/dL    Globulin 2.7 gm/dL    A/G Ratio 1.6 g/dL    BUN/Creatinine Ratio 11.1 7.0 - 25.0    Anion Gap 7.5 5.0 - 15.0 mmol/L    eGFR 114.4 >60.0 mL/min/1.73   Lipase    Specimen: Blood   Result Value Ref Range    Lipase 51 13 - 60 U/L   Urinalysis With Culture If Indicated - Urine, Clean Catch    Specimen: Urine, Clean Catch   Result Value Ref Range    Color, UA Red (A) Yellow, Straw    Appearance, UA Cloudy (A) Clear    pH, UA 6.5 5.0 - 8.0    Specific Gravity, UA 1.013 1.005 - 1.030    Glucose, UA Negative Negative    Ketones, UA Negative Negative    Bilirubin, UA Negative Negative    Blood, UA Large (3+) (A) Negative    Protein, UA 30 mg/dL (1+) (A) Negative    Leuk Esterase, UA Trace (A) Negative    Nitrite, UA Negative Negative    Urobilinogen, UA 0.2 E.U./dL 0.2 - 1.0 E.U./dL   C-reactive Protein    Specimen: Blood   Result Value Ref Range    C-Reactive Protein <0.30 0.00 - 0.50 mg/dL   CBC Auto Differential    Specimen: Blood   Result Value Ref Range    WBC 5.70 3.40 -  10.80 10*3/mm3    RBC 4.95 3.77 - 5.28 10*6/mm3    Hemoglobin 15.2 12.0 - 15.9 g/dL    Hematocrit 45.1 34.0 - 46.6 %    MCV 91.1 79.0 - 97.0 fL    MCH 30.7 26.6 - 33.0 pg    MCHC 33.7 31.5 - 35.7 g/dL    RDW 13.1 12.3 - 15.4 %    RDW-SD 43.8 37.0 - 54.0 fl    MPV 10.3 6.0 - 12.0 fL    Platelets 204 140 - 450 10*3/mm3    Neutrophil % 58.1 42.7 - 76.0 %    Lymphocyte % 33.5 19.6 - 45.3 %    Monocyte % 6.7 5.0 - 12.0 %    Eosinophil % 1.1 0.3 - 6.2 %    Basophil % 0.4 0.0 - 1.5 %    Immature Grans % 0.2 0.0 - 0.5 %    Neutrophils, Absolute 3.32 1.70 - 7.00 10*3/mm3    Lymphocytes, Absolute 1.91 0.70 - 3.10 10*3/mm3    Monocytes, Absolute 0.38 0.10 - 0.90 10*3/mm3    Eosinophils, Absolute 0.06 0.00 - 0.40 10*3/mm3    Basophils, Absolute 0.02 0.00 - 0.20 10*3/mm3    Immature Grans, Absolute 0.01 0.00 - 0.05 10*3/mm3    nRBC 0.0 0.0 - 0.2 /100 WBC   Urinalysis, Microscopic Only - Urine, Clean Catch    Specimen: Urine, Clean Catch   Result Value Ref Range    RBC, UA Too Numerous to Count (A) None Seen, 0-2 /HPF    WBC, UA 21-30 (A) None Seen, 0-2 /HPF    Bacteria, UA 2+ (A) None Seen /HPF    Squamous Epithelial Cells, UA 3-6 (A) None Seen, 0-2 /HPF    Yeast, UA Small/1+ Yeast None Seen /HPF    Hyaline Casts, UA 0-2 None Seen /LPF    Methodology Automated Microscopy                                    Medical Decision Making    This is a 47 year old female patient who presents to the ER with chief complaint of abdominal pain. PMH of kidney stones. Since 2 am, the patient has been complaining of left sided flank pain radiating into the back and LLQ of the abdomen. It has been severe, sharp and intermittent. She has associated dysuria, urinary frequency, nausea and vomiting. No fever, diarrhea, constipation. Just had her urethra dilated 11 days ago.         Hematuria, unspecified type: complicated acute illness or injury  Renal colic on left side: complicated acute illness or injury  Amount and/or Complexity of Data  Reviewed  Labs: ordered. Decision-making details documented in ED Course.      Risk  Prescription drug management.          Final diagnoses:   None       ED Disposition  ED Disposition     None          No follow-up provider specified.       Medication List      No changes were made to your prescriptions during this visit.          Anya Fuentes PA  01/14/23 [id number]

## 2023-01-15 LAB — BACTERIA SPEC AEROBE CULT: NO GROWTH

## 2023-01-17 DIAGNOSIS — N23 RENAL COLIC: ICD-10-CM

## 2023-01-17 DIAGNOSIS — R35.0 FREQUENCY OF MICTURITION: Primary | ICD-10-CM

## 2023-01-17 RX ORDER — NITROFURANTOIN 25; 75 MG/1; MG/1
100 CAPSULE ORAL 2 TIMES DAILY
Qty: 14 CAPSULE | Refills: 0 | Status: SHIPPED | OUTPATIENT
Start: 2023-01-17 | End: 2023-01-24

## 2023-01-17 RX ORDER — PHENAZOPYRIDINE HYDROCHLORIDE 100 MG/1
100 TABLET, FILM COATED ORAL 3 TIMES DAILY PRN
Qty: 30 TABLET | Refills: 1 | Status: SHIPPED | OUTPATIENT
Start: 2023-01-17 | End: 2023-02-28 | Stop reason: SDUPTHER

## 2023-01-17 RX ORDER — PROMETHAZINE HYDROCHLORIDE 25 MG/1
25 TABLET ORAL EVERY 6 HOURS PRN
Qty: 21 TABLET | Refills: 5 | Status: SHIPPED | OUTPATIENT
Start: 2023-01-17

## 2023-01-17 NOTE — TELEPHONE ENCOUNTER
Pt  called and said that wife thought that she had a UTI she is huring and havng hematuria. I sent her over some phenagren, pyridium and macrobid in

## 2023-01-23 ENCOUNTER — TELEPHONE (OUTPATIENT)
Dept: UROLOGY | Facility: CLINIC | Age: 48
End: 2023-01-23
Payer: COMMERCIAL

## 2023-01-23 NOTE — TELEPHONE ENCOUNTER
Patient has blood in urine and having issues urinating. She is asking if she should be seen today or tomorrow.

## 2023-01-24 ENCOUNTER — OFFICE VISIT (OUTPATIENT)
Dept: UROLOGY | Facility: CLINIC | Age: 48
End: 2023-01-24
Payer: COMMERCIAL

## 2023-01-24 VITALS
SYSTOLIC BLOOD PRESSURE: 120 MMHG | DIASTOLIC BLOOD PRESSURE: 81 MMHG | WEIGHT: 130.07 LBS | BODY MASS INDEX: 19.71 KG/M2 | HEIGHT: 68 IN

## 2023-01-24 DIAGNOSIS — R35.0 FREQUENCY OF MICTURITION: Primary | ICD-10-CM

## 2023-01-24 DIAGNOSIS — N23 RENAL COLIC: ICD-10-CM

## 2023-01-24 DIAGNOSIS — R31.0 GROSS HEMATURIA: ICD-10-CM

## 2023-01-24 DIAGNOSIS — N35.028 OTHER POST-TRAUMATIC URETHRAL STRICTURE, FEMALE: ICD-10-CM

## 2023-01-24 LAB
BILIRUB BLD-MCNC: NEGATIVE MG/DL
CLARITY, POC: CLEAR
COLOR UR: YELLOW
EXPIRATION DATE: ABNORMAL
GLUCOSE UR STRIP-MCNC: NEGATIVE MG/DL
KETONES UR QL: NEGATIVE
LEUKOCYTE EST, POC: NEGATIVE
Lab: ABNORMAL
NITRITE UR-MCNC: NEGATIVE MG/ML
PH UR: 6.5 [PH] (ref 5–8)
PROT UR STRIP-MCNC: NEGATIVE MG/DL
RBC # UR STRIP: ABNORMAL /UL
SP GR UR: 1 (ref 1–1.03)
UROBILINOGEN UR QL: NORMAL

## 2023-01-24 PROCEDURE — 81003 URINALYSIS AUTO W/O SCOPE: CPT | Performed by: UROLOGY

## 2023-01-24 PROCEDURE — 53661 DILATION OF URETHRA: CPT | Performed by: UROLOGY

## 2023-01-24 RX ORDER — OXYCODONE AND ACETAMINOPHEN 10; 325 MG/1; MG/1
1 TABLET ORAL EVERY 6 HOURS PRN
Qty: 20 TABLET | Refills: 0 | Status: SHIPPED | OUTPATIENT
Start: 2023-01-24 | End: 2023-02-28 | Stop reason: SDUPTHER

## 2023-01-24 NOTE — PROGRESS NOTES
Chief Complaint:      Chief Complaint   Patient presents with   • Blood in Urine       HPI:   47 y.o. female year for dilation she has extreme difficulty urinating she has not catheterized in over a week.    Past Medical History:     Past Medical History:   Diagnosis Date   • Abdominal pain    • Abdominal swelling    • Hoyos's disease    • Bipolar 1 disorder (HCC)    • Brain tumor (benign) (HCC)    • Brain tumor (HCC)     R Frontal Lobe per pt   • Brain tumor (HCC) 2014   • Constipation    • DDD (degenerative disc disease), cervical 05/29/2017   • DDD (degenerative disc disease), cervical    • Diarrhea    • Fibromyalgia    • IBS (irritable bowel syndrome)    • Migraine    • Nausea & vomiting    • PONV (postoperative nausea and vomiting)    • PTSD (post-traumatic stress disorder)    • Rectal bleeding        Current Meds:     Current Outpatient Medications   Medication Sig Dispense Refill   • albuterol (PROVENTIL HFA;VENTOLIN HFA) 108 (90 Base) MCG/ACT inhaler Inhale 2 puffs 2 (Two) Times a Day.     • Azelastine HCl 137 MCG/SPRAY solution 1 spray into each nostril As Needed (Allergies).     • busPIRone (BUSPAR) 15 MG tablet Take 15 mg by mouth 3 (three) times a day        • ciprofloxacin (CIPRO) 500 MG tablet Take 1 tablet by mouth 2 (Two) Times a Day. 19 tablet 0   • diclofenac (VOLTAREN) 1 % gel gel      • divalproex (DEPAKOTE) 500 MG DR tablet Take 1 tablet by mouth 3 (Three) Times a Day. 90 tablet 2   • docusate sodium (COLACE) 100 MG capsule Take 1 capsule by mouth 2 (Two) Times a Day. 20 capsule 0   • docusate sodium (COLACE) 100 MG capsule Take 1 capsule by mouth 2 (Two) Times a Day As Needed for Constipation. 60 capsule 0   • fluticasone (VERAMYST) 27.5 MCG/SPRAY nasal spray 2 sprays into each nostril Daily.     • megestrol (MEGACE) 20 MG tablet Take 1 tablet by mouth Daily. 30 tablet 3   • methylPREDNISolone (MEDROL) 4 MG dose pack Take as directed on package instructions. 21 tablet 0   • metroNIDAZOLE  (FLAGYL) 500 MG tablet Take 1 tablet by mouth 3 (Three) Times a Day. 30 tablet 0   • montelukast (SINGULAIR) 10 MG tablet Take 10 mg by mouth Every Night.     • nitrofurantoin, macrocrystal-monohydrate, (MACROBID) 100 MG capsule Take 1 capsule by mouth 2 (Two) Times a Day for 7 days. 14 capsule 0   • oxyCODONE-acetaminophen (Percocet)  MG per tablet Take 1 tablet by mouth Every 6 (Six) Hours As Needed for Moderate Pain. 20 tablet 0   • oxyCODONE-acetaminophen (PERCOCET) 5-325 MG per tablet Take 1 tablet by mouth Every 8 (Eight) Hours As Needed for Moderate Pain . 12 tablet 0   • pantoprazole (PROTONIX) 40 MG EC tablet Take 40 mg by mouth 2 (Two) Times a Day As Needed.     • phenazopyridine (PYRIDIUM) 100 MG tablet Take 1 tablet by mouth 3 (Three) Times a Day As Needed for Bladder Spasms. 30 tablet 1   • polyethylene glycol (MIRALAX) 17 GM/SCOOP powder Take 17 g by mouth Daily. 225 g 0   • promethazine (PHENERGAN) 25 MG tablet Take 1 tablet by mouth Every 6 (Six) Hours As Needed for Nausea or Vomiting. 21 tablet 5   • sertraline (ZOLOFT) 100 MG tablet Take 100 mg by mouth 2 (Two) Times a Day.     • SUMAtriptan (IMITREX) 6 MG/0.5ML injection Inject 6 mg under the skin 1 (One) Time.     • terazosin (HYTRIN) 2 MG capsule Take 1 capsule by mouth Every Night for 14 days. 14 capsule 0   • traMADol (ULTRAM) 50 MG tablet        No current facility-administered medications for this visit.        Allergies:      Allergies   Allergen Reactions   • Ativan [Lorazepam] Hallucinations     confusion   • Sulfa Antibiotics Shortness Of Breath and Swelling   • Sulfa Antibiotics Anaphylaxis   • Reglan [Metoclopramide] Angioedema   • Compazine [Prochlorperazine Edisylate] Hives   • Demerol [Meperidine] Hives   • Droperidol Itching   • Metoclopramide Swelling   • Toradol [Ketorolac Tromethamine] Hives and Itching   • Toradol [Ketorolac Tromethamine] Hives   • Zofran [Ondansetron Hcl] Rash   • Zosyn [Piperacillin Sod-Tazobactam So]  Hives        Past Surgical History:     Past Surgical History:   Procedure Laterality Date   • ANAL SCOPE N/A 2016    Procedure: ANAL SCOPE;  Surgeon: Kael Lopez MD;  Location: [facility] OR;  Service:    • APPENDECTOMY     • COLONOSCOPY N/A 2016    Procedure: COLONOSCOPY  CPTCODE:13334;  Surgeon: Jose Antonio Belle III, MD;  Location: [facility] OR;  Service:    • COLONOSCOPY N/A 2016    Procedure: COLONOSCOPY (13786) CPT;  Surgeon: Jose Antonio Belle III, MD;  Location: [facility] OR;  Service:    • CYSTOSCOPY RETROGRADE PYELOGRAM Bilateral 2017    Procedure: CYSTOSCOPY RETROGRADE PYELOGRAM;  Surgeon: Mu Blunt MD;  Location: [facility] OR;  Service:    • ENDOSCOPY N/A 2016    Procedure: ESOPHAGOGASTRODUODENOSCOPY WITH BIOPSY  CPTCODE:14978;  Surgeon: Jose Antonio Belle III, MD;  Location: [facility] OR;  Service:    • HEMORRHOIDECTOMY N/A 2016    Procedure: HEMORRHOID STAPLING;  Surgeon: Kael Lopez MD;  Location: [facility] OR;  Service:    • HYSTERECTOMY     • KNEE SURGERY     • LAPAROSCOPIC SALPINGOOPHERECTOMY     • PORTACATH PLACEMENT N/A 2017    Procedure: INSERTION OF PORTACATH;  Surgeon: Celso Arredondo MD;  Location: [facility] OR;  Service:    • SHOULDER SURGERY      3 times       Social History:     Social History     Socioeconomic History   • Marital status:    Tobacco Use   • Smoking status: Former     Packs/day: 1.00     Years: 20.00     Pack years: 20.00     Types: Cigarettes     Quit date: 2015     Years since quittin.2   • Smokeless tobacco: Never   Vaping Use   • Vaping Use: Never used   Substance and Sexual Activity   • Alcohol use: No   • Drug use: Yes     Types: Marijuana     Comment: for pain   • Sexual activity: Defer     Birth control/protection: Surgical       Family History:     Family History   Problem Relation Age of Onset   • Crohn's disease Other    • Hypertension Other    • Diabetes Other    • Irritable bowel syndrome Other    • No  Known Problems Father    • No Known Problems Mother        Review of Systems:     Review of Systems   Constitutional: Negative.  Negative for activity change, appetite change, chills, diaphoresis, fatigue and unexpected weight change.   HENT: Negative for congestion, dental problem, drooling, ear discharge, ear pain, facial swelling, hearing loss, mouth sores, nosebleeds, postnasal drip, rhinorrhea, sinus pressure, sneezing, sore throat, tinnitus, trouble swallowing and voice change.    Eyes: Negative.  Negative for photophobia, pain, discharge, redness, itching and visual disturbance.   Respiratory: Negative.  Negative for apnea, cough, choking, chest tightness, shortness of breath, wheezing and stridor.    Cardiovascular: Negative.  Negative for chest pain, palpitations and leg swelling.   Gastrointestinal: Negative.  Negative for abdominal distention, abdominal pain, anal bleeding, blood in stool, constipation, diarrhea, nausea, rectal pain and vomiting.   Endocrine: Negative.  Negative for cold intolerance, heat intolerance, polydipsia, polyphagia and polyuria.   Genitourinary: Positive for difficulty urinating.   Musculoskeletal: Negative.  Negative for arthralgias, back pain, gait problem, joint swelling, myalgias, neck pain and neck stiffness.   Skin: Negative.  Negative for color change, pallor, rash and wound.   Allergic/Immunologic: Negative.  Negative for environmental allergies, food allergies and immunocompromised state.   Neurological: Negative.  Negative for dizziness, tremors, seizures, syncope, facial asymmetry, speech difficulty, weakness, light-headedness, numbness and headaches.   Hematological: Negative.  Negative for adenopathy. Does not bruise/bleed easily.   Psychiatric/Behavioral: Negative for agitation, behavioral problems, confusion, decreased concentration, dysphoric mood, hallucinations, self-injury, sleep disturbance and suicidal ideas. The patient is not nervous/anxious and is not  hyperactive.    All other systems reviewed and are negative.      Physical Exam:     Physical Exam  Constitutional:       Appearance: She is well-developed.   HENT:      Head: Normocephalic and atraumatic.      Right Ear: External ear normal.      Left Ear: External ear normal.   Eyes:      Conjunctiva/sclera: Conjunctivae normal.      Pupils: Pupils are equal, round, and reactive to light.   Cardiovascular:      Rate and Rhythm: Normal rate and regular rhythm.      Heart sounds: Normal heart sounds.   Pulmonary:      Effort: Pulmonary effort is normal.      Breath sounds: Normal breath sounds.   Abdominal:      General: Bowel sounds are normal. There is no distension.      Palpations: Abdomen is soft. There is no mass.      Tenderness: There is no abdominal tenderness. There is no guarding or rebound.   Genitourinary:     General: Normal vulva.      Vagina: No vaginal discharge.   Musculoskeletal:         General: Normal range of motion.   Skin:     General: Skin is warm and dry.   Neurological:      Mental Status: She is alert.      Deep Tendon Reflexes: Reflexes are normal and symmetric.   Psychiatric:         Behavior: Behavior normal.         Thought Content: Thought content normal.         Judgment: Judgment normal.         I have reviewed the following portions of the patient's history: Allergies, current medications, past family history, past medical history, past social history, past surgical history, problem list, and ROS and confirm it is accurate.    Recent Image (CT and/or KUB):      CT Abdomen and Pelvis: No results found for this or any previous visit.       CT Stone Protocol: Results for orders placed during the hospital encounter of 01/10/23    CT Abdomen Pelvis Stone Protocol    Narrative  EXAM:  CT Abdomen and Pelvis Without Intravenous Contrast    EXAM DATE:  1/10/2023 1:58 PM    CLINICAL HISTORY:  Flank pain, kidney stone suspected    TECHNIQUE:  Axial computed tomography images of the abdomen  and pelvis without  intravenous contrast.  Sagittal and coronal reformatted images were  created and reviewed.  This CT exam was performed using one or more of  the following dose reduction techniques:  automated exposure control,  adjustment of the mA and/or kV according to patient size, and/or use of  iterative reconstruction technique.    COMPARISON:  10/03/2022    FINDINGS:  Lung bases:  Stable nodule right lower lobe which may be  endobronchial. 7.4 mm. This may represent mucus plugging given  stability.    ABDOMEN:  Liver:  Unremarkable.  Gallbladder and bile ducts:  Unremarkable.  No calcified stones.  No  ductal dilation.  Pancreas:  Unremarkable.  No ductal dilation.  Spleen:  Unremarkable.  No splenomegaly.  Adrenals:  Unremarkable.  No mass.  Kidneys and ureters:  No renal or ureteral stones. No obstructive  uropathy.  Stomach and bowel:  Mild constipation.  Wall thickening of the  transverse colon and right colon in part related to incomplete  distention.    PELVIS:  Appendix:  Appendectomy.  Bladder:  Decompressed urinary bladder.  No stones.  Reproductive:  Changes of previous hysterectomy are noted.    ABDOMEN and PELVIS:  Intraperitoneal space:  No ascites or abscess.  No free air.  Bones/joints:  Stable chronic compression deformity of L2.  No acute  bony findings.  No dislocation.  Soft tissues:  Unremarkable.  Vasculature:  Atherosclerosis stable without evidence of aneurysm.  Lymph nodes:  No retroperitoneal lymphadenopathy.  No iliac  adenopathy.    Impression  1.  Thickening of the transverse colon and right colon which may be in  part related to incomplete distention. Colitis not excluded.  2.  Mild constipation.  3.  Changes of prior hysterectomy and appendectomy.  4.  Probable mucus plugging of the right lower lobe distal bronchial  airway which is stable from previous. CT follow-up in 3 months  recommended.  5.  Other nonacute/incidental findings as detailed above.    This report was  finalized on 1/10/2023 2:21 PM by Dr. Ankit Naik MD.       KUB: Results for orders placed during the hospital encounter of 01/10/23    XR Abdomen KUB    Narrative  EXAM:  XR Abdomen, 1 View    EXAM DATE:  1/10/2023 12:05 PM    CLINICAL HISTORY:  flank pain    TECHNIQUE:  Frontal supine view of the abdomen/pelvis.    COMPARISON:  10/01/2022    FINDINGS:  Gastrointestinal tract:  Unremarkable.  No dilation.  Organs:  No stones identified along the expected course of ureters.  Bones/joints:  Unremarkable.  Soft tissues:  Surgical staples in the lower pelvis.  Vasculature:  Stable phleboliths lower pelvis.    Impression  1.  No acute findings radiographically evident.  2.  No radiographic evidence of renal or ureteral stones.    This report was finalized on 1/10/2023 12:47 PM by Dr. Ankit Naik MD.       Labs (past 3 months):      Admission on 01/14/2023, Discharged on 01/14/2023   Component Date Value Ref Range Status   • Glucose 01/14/2023 96  65 - 99 mg/dL Final   • BUN 01/14/2023 6  6 - 20 mg/dL Final   • Creatinine 01/14/2023 0.54 (L)  0.57 - 1.00 mg/dL Final   • Sodium 01/14/2023 137  136 - 145 mmol/L Final   • Potassium 01/14/2023 4.2  3.5 - 5.2 mmol/L Final   • Chloride 01/14/2023 106  98 - 107 mmol/L Final   • CO2 01/14/2023 23.5  22.0 - 29.0 mmol/L Final   • Calcium 01/14/2023 9.2  8.6 - 10.5 mg/dL Final   • Total Protein 01/14/2023 7.0  6.0 - 8.5 g/dL Final   • Albumin 01/14/2023 4.3  3.5 - 5.2 g/dL Final   • ALT (SGPT) 01/14/2023 29  1 - 33 U/L Final   • AST (SGOT) 01/14/2023 24  1 - 32 U/L Final   • Alkaline Phosphatase 01/14/2023 80  39 - 117 U/L Final   • Total Bilirubin 01/14/2023 0.6  0.0 - 1.2 mg/dL Final   • Globulin 01/14/2023 2.7  gm/dL Final   • A/G Ratio 01/14/2023 1.6  g/dL Final   • BUN/Creatinine Ratio 01/14/2023 11.1  7.0 - 25.0 Final   • Anion Gap 01/14/2023 7.5  5.0 - 15.0 mmol/L Final   • eGFR 01/14/2023 114.4  >60.0 mL/min/1.73 Final    National Kidney Foundation and American  Society of Nephrology (ASN) Task Force recommended calculation based on the Chronic Kidney Disease Epidemiology Collaboration (CKD-EPI) equation refit without adjustment for race.   • Lipase 01/14/2023 51  13 - 60 U/L Final   • Color, UA 01/14/2023 Red (A)  Yellow, Straw Final    Dipstick results may be inaccurate due to color interference.       • Appearance, UA 01/14/2023 Cloudy (A)  Clear Final   • pH, UA 01/14/2023 6.5  5.0 - 8.0 Final   • Specific Gravity, UA 01/14/2023 1.013  1.005 - 1.030 Final   • Glucose, UA 01/14/2023 Negative  Negative Final   • Ketones, UA 01/14/2023 Negative  Negative Final   • Bilirubin, UA 01/14/2023 Negative  Negative Final   • Blood, UA 01/14/2023 Large (3+) (A)  Negative Final   • Protein, UA 01/14/2023 30 mg/dL (1+) (A)  Negative Final   • Leuk Esterase, UA 01/14/2023 Trace (A)  Negative Final   • Nitrite, UA 01/14/2023 Negative  Negative Final   • Urobilinogen, UA 01/14/2023 0.2 E.U./dL  0.2 - 1.0 E.U./dL Final   • C-Reactive Protein 01/14/2023 <0.30  0.00 - 0.50 mg/dL Final   • WBC 01/14/2023 5.70  3.40 - 10.80 10*3/mm3 Final   • RBC 01/14/2023 4.95  3.77 - 5.28 10*6/mm3 Final   • Hemoglobin 01/14/2023 15.2  12.0 - 15.9 g/dL Final   • Hematocrit 01/14/2023 45.1  34.0 - 46.6 % Final   • MCV 01/14/2023 91.1  79.0 - 97.0 fL Final   • MCH 01/14/2023 30.7  26.6 - 33.0 pg Final   • MCHC 01/14/2023 33.7  31.5 - 35.7 g/dL Final   • RDW 01/14/2023 13.1  12.3 - 15.4 % Final   • RDW-SD 01/14/2023 43.8  37.0 - 54.0 fl Final   • MPV 01/14/2023 10.3  6.0 - 12.0 fL Final   • Platelets 01/14/2023 204  140 - 450 10*3/mm3 Final   • Neutrophil % 01/14/2023 58.1  42.7 - 76.0 % Final   • Lymphocyte % 01/14/2023 33.5  19.6 - 45.3 % Final   • Monocyte % 01/14/2023 6.7  5.0 - 12.0 % Final   • Eosinophil % 01/14/2023 1.1  0.3 - 6.2 % Final   • Basophil % 01/14/2023 0.4  0.0 - 1.5 % Final   • Immature Grans % 01/14/2023 0.2  0.0 - 0.5 % Final   • Neutrophils, Absolute 01/14/2023 3.32  1.70 - 7.00  10*3/mm3 Final   • Lymphocytes, Absolute 01/14/2023 1.91  0.70 - 3.10 10*3/mm3 Final   • Monocytes, Absolute 01/14/2023 0.38  0.10 - 0.90 10*3/mm3 Final   • Eosinophils, Absolute 01/14/2023 0.06  0.00 - 0.40 10*3/mm3 Final   • Basophils, Absolute 01/14/2023 0.02  0.00 - 0.20 10*3/mm3 Final   • Immature Grans, Absolute 01/14/2023 0.01  0.00 - 0.05 10*3/mm3 Final   • nRBC 01/14/2023 0.0  0.0 - 0.2 /100 WBC Final   • RBC, UA 01/14/2023 Too Numerous to Count (A)  None Seen, 0-2 /HPF Final   • WBC, UA 01/14/2023 21-30 (A)  None Seen, 0-2 /HPF Final   • Bacteria, UA 01/14/2023 2+ (A)  None Seen /HPF Final   • Squamous Epithelial Cells, UA 01/14/2023 3-6 (A)  None Seen, 0-2 /HPF Final   • Yeast, UA 01/14/2023 Small/1+ Yeast  None Seen /HPF Final   • Hyaline Casts, UA 01/14/2023 0-2  None Seen /LPF Final   • Methodology 01/14/2023 Automated Microscopy   Final   • Urine Culture 01/14/2023 No growth   Final   Admission on 01/10/2023, Discharged on 01/10/2023   Component Date Value Ref Range Status   • Glucose 01/10/2023 117 (H)  65 - 99 mg/dL Final   • BUN 01/10/2023 16  6 - 20 mg/dL Final   • Creatinine 01/10/2023 0.60  0.57 - 1.00 mg/dL Final   • Sodium 01/10/2023 136  136 - 145 mmol/L Final   • Potassium 01/10/2023 3.9  3.5 - 5.2 mmol/L Final    Slight hemolysis detected by analyzer. Results may be affected.   • Chloride 01/10/2023 105  98 - 107 mmol/L Final   • CO2 01/10/2023 17.3 (L)  22.0 - 29.0 mmol/L Final   • Calcium 01/10/2023 9.2  8.6 - 10.5 mg/dL Final   • Total Protein 01/10/2023 7.1  6.0 - 8.5 g/dL Final   • Albumin 01/10/2023 3.8  3.5 - 5.2 g/dL Final   • ALT (SGPT) 01/10/2023 30  1 - 33 U/L Final   • AST (SGOT) 01/10/2023 28  1 - 32 U/L Final   • Alkaline Phosphatase 01/10/2023 66  39 - 117 U/L Final   • Total Bilirubin 01/10/2023 0.6  0.0 - 1.2 mg/dL Final   • Globulin 01/10/2023 3.3  gm/dL Final   • A/G Ratio 01/10/2023 1.2  g/dL Final   • BUN/Creatinine Ratio 01/10/2023 26.7 (H)  7.0 - 25.0 Final   • Anion  Gap 01/10/2023 13.7  5.0 - 15.0 mmol/L Final   • eGFR 01/10/2023 111.6  >60.0 mL/min/1.73 Final    National Kidney Foundation and American Society of Nephrology (ASN) Task Force recommended calculation based on the Chronic Kidney Disease Epidemiology Collaboration (CKD-EPI) equation refit without adjustment for race.   • Color, UA 01/10/2023 Red (A)  Yellow, Straw Final   • Appearance, UA 01/10/2023 Cloudy (A)  Clear Final   • pH, UA 01/10/2023 6.5  5.0 - 8.0 Final   • Specific Gravity, UA 01/10/2023 1.022  1.005 - 1.030 Final   • Glucose, UA 01/10/2023 Negative  Negative Final   • Ketones, UA 01/10/2023 Negative  Negative Final   • Bilirubin, UA 01/10/2023 Negative  Negative Final   • Blood, UA 01/10/2023 Large (3+) (A)  Negative Final   • Protein, UA 01/10/2023 30 mg/dL (1+) (A)  Negative Final   • Leuk Esterase, UA 01/10/2023 Small (1+) (A)  Negative Final   • Nitrite, UA 01/10/2023 Negative  Negative Final   • Urobilinogen, UA 01/10/2023 1.0 E.U./dL  0.2 - 1.0 E.U./dL Final   • C-Reactive Protein 01/10/2023 <0.30  0.00 - 0.50 mg/dL Final   • WBC 01/10/2023 5.17  3.40 - 10.80 10*3/mm3 Final   • RBC 01/10/2023 5.15  3.77 - 5.28 10*6/mm3 Final   • Hemoglobin 01/10/2023 15.5  12.0 - 15.9 g/dL Final   • Hematocrit 01/10/2023 46.9 (H)  34.0 - 46.6 % Final   • MCV 01/10/2023 91.1  79.0 - 97.0 fL Final   • MCH 01/10/2023 30.1  26.6 - 33.0 pg Final   • MCHC 01/10/2023 33.0  31.5 - 35.7 g/dL Final   • RDW 01/10/2023 13.4  12.3 - 15.4 % Final   • RDW-SD 01/10/2023 45.4  37.0 - 54.0 fl Final   • MPV 01/10/2023 10.4  6.0 - 12.0 fL Final   • Platelets 01/10/2023 193  140 - 450 10*3/mm3 Final   • Neutrophil % 01/10/2023 57.1  42.7 - 76.0 % Final   • Lymphocyte % 01/10/2023 34.6  19.6 - 45.3 % Final   • Monocyte % 01/10/2023 6.4  5.0 - 12.0 % Final   • Eosinophil % 01/10/2023 1.5  0.3 - 6.2 % Final   • Basophil % 01/10/2023 0.2  0.0 - 1.5 % Final   • Immature Grans % 01/10/2023 0.2  0.0 - 0.5 % Final   • Neutrophils, Absolute  01/10/2023 2.95  1.70 - 7.00 10*3/mm3 Final   • Lymphocytes, Absolute 01/10/2023 1.79  0.70 - 3.10 10*3/mm3 Final   • Monocytes, Absolute 01/10/2023 0.33  0.10 - 0.90 10*3/mm3 Final   • Eosinophils, Absolute 01/10/2023 0.08  0.00 - 0.40 10*3/mm3 Final   • Basophils, Absolute 01/10/2023 0.01  0.00 - 0.20 10*3/mm3 Final   • Immature Grans, Absolute 01/10/2023 0.01  0.00 - 0.05 10*3/mm3 Final   • nRBC 01/10/2023 0.0  0.0 - 0.2 /100 WBC Final   • Extra Tube 01/10/2023 Hold for add-ons.   Final    Auto resulted.   • Extra Tube 01/10/2023 hold for add-on   Final    Auto resulted   • Extra Tube 01/10/2023 Hold for add-ons.   Final    Auto resulted.   • Extra Tube 01/10/2023 Hold for add-ons.   Final    Auto resulted   • RBC, UA 01/10/2023 Too Numerous to Count (A)  None Seen, 0-2 /HPF Final   • WBC, UA 01/10/2023 6-12 (A)  None Seen, 0-2 /HPF Final   • Bacteria, UA 01/10/2023 None Seen  None Seen /HPF Final   • Squamous Epithelial Cells, UA 01/10/2023 3-6 (A)  None Seen, 0-2 /HPF Final   • Hyaline Casts, UA 01/10/2023 None Seen  None Seen /LPF Final   • Methodology 01/10/2023 Automated Microscopy   Final   • Urine Culture 01/10/2023 No growth   Final   Admission on 11/26/2022, Discharged on 11/27/2022   Component Date Value Ref Range Status   • QT Interval 11/26/2022 392  ms Final   • QTC Interval 11/26/2022 446  ms Final   • Glucose 11/26/2022 110 (H)  65 - 99 mg/dL Final   • BUN 11/26/2022 7  6 - 20 mg/dL Final   • Creatinine 11/26/2022 0.63  0.57 - 1.00 mg/dL Final   • Sodium 11/26/2022 139  136 - 145 mmol/L Final   • Potassium 11/26/2022 3.5  3.5 - 5.2 mmol/L Final   • Chloride 11/26/2022 103  98 - 107 mmol/L Final   • CO2 11/26/2022 22.9  22.0 - 29.0 mmol/L Final   • Calcium 11/26/2022 9.0  8.6 - 10.5 mg/dL Final   • Total Protein 11/26/2022 6.6  6.0 - 8.5 g/dL Final   • Albumin 11/26/2022 4.11  3.50 - 5.20 g/dL Final   • ALT (SGPT) 11/26/2022 35 (H)  1 - 33 U/L Final   • AST (SGOT) 11/26/2022 34 (H)  1 - 32 U/L Final    • Alkaline Phosphatase 11/26/2022 69  39 - 117 U/L Final   • Total Bilirubin 11/26/2022 0.6  0.0 - 1.2 mg/dL Final   • Globulin 11/26/2022 2.5  gm/dL Final   • A/G Ratio 11/26/2022 1.7  g/dL Final   • BUN/Creatinine Ratio 11/26/2022 11.1  7.0 - 25.0 Final   • Anion Gap 11/26/2022 13.1  5.0 - 15.0 mmol/L Final   • eGFR 11/26/2022 110.3  >60.0 mL/min/1.73 Final    National Kidney Foundation and American Society of Nephrology (ASN) Task Force recommended calculation based on the Chronic Kidney Disease Epidemiology Collaboration (CKD-EPI) equation refit without adjustment for race.   • Troponin T 11/26/2022 <0.010  0.000 - 0.030 ng/mL Final   • Troponin T 11/26/2022 <0.010  0.000 - 0.030 ng/mL Final   • Extra Tube 11/26/2022 Hold for add-ons.   Final    Auto resulted.   • Extra Tube 11/26/2022 hold for add-on   Final    Auto resulted   • Extra Tube 11/26/2022 Hold for add-ons.   Final    Auto resulted.   • Extra Tube 11/26/2022 Hold for add-ons.   Final    Auto resulted   • WBC 11/26/2022 5.41  3.40 - 10.80 10*3/mm3 Final   • RBC 11/26/2022 4.82  3.77 - 5.28 10*6/mm3 Final   • Hemoglobin 11/26/2022 14.8  12.0 - 15.9 g/dL Final   • Hematocrit 11/26/2022 45.1  34.0 - 46.6 % Final   • MCV 11/26/2022 93.6  79.0 - 97.0 fL Final   • MCH 11/26/2022 30.7  26.6 - 33.0 pg Final   • MCHC 11/26/2022 32.8  31.5 - 35.7 g/dL Final   • RDW 11/26/2022 13.2  12.3 - 15.4 % Final   • RDW-SD 11/26/2022 45.0  37.0 - 54.0 fl Final   • MPV 11/26/2022 10.4  6.0 - 12.0 fL Final   • Platelets 11/26/2022 164  140 - 450 10*3/mm3 Final   • Neutrophil % 11/26/2022 57.5  42.7 - 76.0 % Final   • Lymphocyte % 11/26/2022 34.2  19.6 - 45.3 % Final   • Monocyte % 11/26/2022 6.1  5.0 - 12.0 % Final   • Eosinophil % 11/26/2022 1.8  0.3 - 6.2 % Final   • Basophil % 11/26/2022 0.2  0.0 - 1.5 % Final   • Immature Grans % 11/26/2022 0.2  0.0 - 0.5 % Final   • Neutrophils, Absolute 11/26/2022 3.11  1.70 - 7.00 10*3/mm3 Final   • Lymphocytes, Absolute 11/26/2022  1.85  0.70 - 3.10 10*3/mm3 Final   • Monocytes, Absolute 11/26/2022 0.33  0.10 - 0.90 10*3/mm3 Final   • Eosinophils, Absolute 11/26/2022 0.10  0.00 - 0.40 10*3/mm3 Final   • Basophils, Absolute 11/26/2022 0.01  0.00 - 0.20 10*3/mm3 Final   • Immature Grans, Absolute 11/26/2022 0.01  0.00 - 0.05 10*3/mm3 Final   • nRBC 11/26/2022 0.0  0.0 - 0.2 /100 WBC Final   • D-Dimer, Quantitative 11/26/2022 1.62 (C)  0.00 - 0.50 MCGFEU/mL Final        Procedure:   Urethral dilation-after an appropriate informed consent, the patient was brought to the procedure suite.  The urethra was gently anesthetized with 10 mL of 2% viscous Xylocaine jelly.  After an adequate period of topical anesthesia I went ahead and dilated with [place] sounds from 16 to 26 Kinyarwanda sequentially without complication. The patient was given gentamicin as prophylaxis with 80 mg.       Assessment/Plan:   Urethral stricture-status post dilation  Narcotic pain medication-patient has significant acute pain that I believe would be an indication for the use of narcotic pain medication.  I discussed the significant risks of pain medication and the fact that this will be a short only option and I will give her no more than a three-day supply of pain medication, I will not plan long-term medication, and that this will be sent to a pain clinic if it at all becomes necessary.  We discussed signing a pain medication agreement and the fact that we're going to run a state [facility] review to be sure the patient is not getting pain medication from elsewhere.  If this is the case, we will not give pain medication as part of the patient's treatment plan of there being prescribed a controlled substance with potential for abuse.  This patient has been well aware of the appropriate dose of such medications including the risks for somnolence, limited ability to drive and/or safety and the significant potential for overdose.  It has been made clear that these medications are for  the prescribed patient only without concomitant use of alcohol or other substance unless prescribed by the medical provider.  Has completed prescribing agreement detailing the terms of continue prescribing him a controlled substance including monitoring Luis Alberto reports, the possibility of urine drug screens, and pill counts.  The patient is aware that we review LUIS ALBERTO reports on a regular basis and scan them into the chart.  History and physical examination exhibited continued safe and appropriate use of controlled substances. We also discussed the fact that the new [place] legislation allows only a three-day prescription for pain medication.  In this situation he will be referred to a chronic pain clinic.              This document has been electronically signed by VERENICE FIKN MD January 24, 2023 12:54 EST    Dictated Utilizing Dragon Dictation: Part of this note may be an electronic transcription/translation of spoken language to printed text using the Dragon Dictation System.

## 2023-02-01 ENCOUNTER — HOSPITAL ENCOUNTER (EMERGENCY)
Facility: HOSPITAL | Age: 48
Discharge: HOME | End: 2023-02-01
Admitting: EMERGENCY MEDICINE
Payer: COMMERCIAL

## 2023-02-01 VITALS
BODY MASS INDEX: 19.1 KG/M2 | SYSTOLIC BLOOD PRESSURE: 146 MMHG | HEIGHT: 68 IN | DIASTOLIC BLOOD PRESSURE: 57 MMHG | HEART RATE: 82 BPM | RESPIRATION RATE: 18 BRPM | WEIGHT: 126 LBS | TEMPERATURE: 97.3 F | OXYGEN SATURATION: 99 %

## 2023-02-01 DIAGNOSIS — R51.9 ACUTE NONINTRACTABLE HEADACHE, UNSPECIFIED HEADACHE TYPE: Primary | ICD-10-CM

## 2023-02-01 PROCEDURE — 96372 THER/PROPH/DIAG INJ SC/IM: CPT

## 2023-02-01 PROCEDURE — 25010000002 HYDROMORPHONE 1 MG/ML SOLUTION: Performed by: NURSE PRACTITIONER

## 2023-02-01 PROCEDURE — 63710000001 PROMETHAZINE PER 25 MG: Performed by: NURSE PRACTITIONER

## 2023-02-01 PROCEDURE — 99283 EMERGENCY DEPT VISIT LOW MDM: CPT

## 2023-02-01 RX ORDER — PROMETHAZINE HYDROCHLORIDE 25 MG/1
25 TABLET ORAL ONCE
Status: COMPLETED | OUTPATIENT
Start: 2023-02-01 | End: 2023-02-01

## 2023-02-01 RX ADMIN — HYDROMORPHONE HYDROCHLORIDE 1 MG: 1 INJECTION, SOLUTION INTRAMUSCULAR; INTRAVENOUS; SUBCUTANEOUS at 20:57

## 2023-02-01 RX ADMIN — PROMETHAZINE HYDROCHLORIDE 25 MG: 25 TABLET ORAL at 20:57

## 2023-02-02 NOTE — ED NOTES
MEDICAL SCREENING:    Reason for Visit: Headache    Patient initially seen in triage.  The patient was advised further evaluation and diagnostic testing will be needed, some of the treatment and testing will be initiated in the lobby in order to begin the process.  The patient will be returned to the waiting area for the time being and possibly be re-assessed by a subsequent ED provider.  The patient will be brought back to the treatment area in as timely manner as possible.       Jaiden Bonner MD  02/01/23 1914

## 2023-02-02 NOTE — ED PROVIDER NOTES
Subjective   History of Present Illness  Patient is a 48-year-old white female presents to the emergency room today with complaint of headache x3 days.  Patient reports pain is moderate to severe.  Patient denies any nausea and vomiting.  Patient denies any numbness or tingling.  Denies fever.  He does have a long extensive history of migraine headaches.  She reports has been through a lot of stress lately recently.  Denies any worsening factors.  Patient reports no alleviating factors.  Patient reports that she has tried home medication without improvement.    Headache      Review of Systems   Constitutional: Negative.    Eyes: Negative.    Respiratory: Negative.    Cardiovascular: Negative.    Gastrointestinal: Negative.    Endocrine: Negative.    Genitourinary: Negative.    Musculoskeletal: Negative.    Skin: Negative.    Allergic/Immunologic: Negative.    Neurological: Positive for headaches.   Hematological: Negative.    Psychiatric/Behavioral: Negative.        Past Medical History:   Diagnosis Date   • Abdominal pain    • Abdominal swelling    • Hoyos's disease    • Bipolar 1 disorder (HCC)    • Brain tumor (benign) (HCC)    • Brain tumor (HCC)     R Frontal Lobe per pt   • Brain tumor (HCC) 2014   • Constipation    • DDD (degenerative disc disease), cervical 05/29/2017   • DDD (degenerative disc disease), cervical    • Diarrhea    • Fibromyalgia    • IBS (irritable bowel syndrome)    • Migraine    • Nausea & vomiting    • PONV (postoperative nausea and vomiting)    • PTSD (post-traumatic stress disorder)    • Rectal bleeding        Allergies   Allergen Reactions   • Ativan [Lorazepam] Hallucinations     confusion   • Sulfa Antibiotics Shortness Of Breath and Swelling   • Sulfa Antibiotics Anaphylaxis   • Reglan [Metoclopramide] Angioedema   • Compazine [Prochlorperazine Edisylate] Hives   • Demerol [Meperidine] Hives   • Droperidol Itching   • Metoclopramide Swelling   • Toradol [Ketorolac Tromethamine]  Hives and Itching   • Toradol [Ketorolac Tromethamine] Hives   • Zofran [Ondansetron Hcl] Rash   • Zosyn [Piperacillin Sod-Tazobactam So] Hives       Past Surgical History:   Procedure Laterality Date   • ANAL SCOPE N/A 2016    Procedure: ANAL SCOPE;  Surgeon: Kael Lopez MD;  Location: [facility] OR;  Service:    • APPENDECTOMY     • COLONOSCOPY N/A 2016    Procedure: COLONOSCOPY  CPTCODE:97034;  Surgeon: Jose Antonio Belle III, MD;  Location: [facility] OR;  Service:    • COLONOSCOPY N/A 2016    Procedure: COLONOSCOPY (97257) CPT;  Surgeon: Jose Antonio Belle III, MD;  Location: [facility] OR;  Service:    • CYSTOSCOPY RETROGRADE PYELOGRAM Bilateral 2017    Procedure: CYSTOSCOPY RETROGRADE PYELOGRAM;  Surgeon: Mu Blunt MD;  Location: [facility] OR;  Service:    • ENDOSCOPY N/A 2016    Procedure: ESOPHAGOGASTRODUODENOSCOPY WITH BIOPSY  CPTCODE:49284;  Surgeon: Jose Antonio Belle III, MD;  Location: [facility] OR;  Service:    • HEMORRHOIDECTOMY N/A 2016    Procedure: HEMORRHOID STAPLING;  Surgeon: Kael Lopez MD;  Location: [facility] OR;  Service:    • HYSTERECTOMY     • KNEE SURGERY     • LAPAROSCOPIC SALPINGOOPHERECTOMY     • PORTACATH PLACEMENT N/A 2017    Procedure: INSERTION OF PORTACATH;  Surgeon: Celso Arredondo MD;  Location: [facility] OR;  Service:    • SHOULDER SURGERY      3 times       Family History   Problem Relation Age of Onset   • Crohn's disease Other    • Hypertension Other    • Diabetes Other    • Irritable bowel syndrome Other    • No Known Problems Father    • No Known Problems Mother        Social History     Socioeconomic History   • Marital status:    Tobacco Use   • Smoking status: Former     Packs/day: 1.00     Years: 20.00     Pack years: 20.00     Types: Cigarettes     Quit date: 2015     Years since quittin.2   • Smokeless tobacco: Never   Vaping Use   • Vaping Use: Never used   Substance and Sexual Activity   • Alcohol use: No   •  Drug use: Yes     Types: Marijuana     Comment: for pain   • Sexual activity: Defer     Birth control/protection: Surgical           Objective   Physical Exam  Vitals and nursing note reviewed.   Constitutional:       Appearance: She is well-developed.   HENT:      Head: Normocephalic.      Right Ear: External ear normal.      Left Ear: External ear normal.   Eyes:      Conjunctiva/sclera: Conjunctivae normal.      Pupils: Pupils are equal, round, and reactive to light.   Cardiovascular:      Rate and Rhythm: Normal rate and regular rhythm.      Heart sounds: Normal heart sounds.   Pulmonary:      Effort: Pulmonary effort is normal.      Breath sounds: Normal breath sounds.   Abdominal:      General: Bowel sounds are normal.      Palpations: Abdomen is soft.   Musculoskeletal:         General: Normal range of motion.      Cervical back: Normal range of motion and neck supple.   Skin:     General: Skin is warm and dry.      Capillary Refill: Capillary refill takes less than 2 seconds.   Neurological:      Mental Status: She is alert and oriented to person, place, and time.   Psychiatric:         Behavior: Behavior normal.         Thought Content: Thought content normal.         Procedures           ED Course                                           Medical Decision Making  Patient is a 48-year-old white female presents to the emergency room today with complaint of headache x3 days.  Patient reports pain is moderate to severe.  Patient denies any nausea and vomiting.  Patient denies any numbness or tingling.  Denies fever.  He does have a long extensive history of migraine headaches.  She reports has been through a lot of stress lately recently.  Denies any worsening factors.  Patient reports no alleviating factors.  Patient reports that she has tried home medication without improvement.        Acute nonintractable headache, unspecified headache type: complicated acute illness or injury  Risk  Prescription drug  management.          Final diagnoses:   Acute nonintractable headache, unspecified headache type       ED Disposition  ED Disposition     ED Disposition   Discharge    Condition   Stable    Comment   --             Mabel Patterson, APRN  [address] [id number]  [phone]    Schedule an appointment as soon as possible for a visit   For further evaluation         Medication List      No changes were made to your prescriptions during this visit.          Ja Suarez, APRN  02/01/23 7877

## 2023-02-06 ENCOUNTER — HOSPITAL ENCOUNTER (EMERGENCY)
Facility: HOSPITAL | Age: 48
Discharge: LEFT BEFORE DISCHARGE | End: 2023-02-06
Admitting: EMERGENCY MEDICINE
Payer: COMMERCIAL

## 2023-02-06 VITALS
SYSTOLIC BLOOD PRESSURE: 138 MMHG | OXYGEN SATURATION: 96 % | BODY MASS INDEX: 19.4 KG/M2 | HEART RATE: 71 BPM | TEMPERATURE: 97.5 F | DIASTOLIC BLOOD PRESSURE: 80 MMHG | HEIGHT: 68 IN | RESPIRATION RATE: 18 BRPM | WEIGHT: 128 LBS

## 2023-02-06 LAB
ALBUMIN SERPL-MCNC: 4 G/DL (ref 3.5–5.2)
ALBUMIN/GLOB SERPL: 1.5 G/DL
ALP SERPL-CCNC: 66 U/L (ref 39–117)
ALT SERPL W P-5'-P-CCNC: 42 U/L (ref 1–33)
ANION GAP SERPL CALCULATED.3IONS-SCNC: 7.9 MMOL/L (ref 5–15)
AST SERPL-CCNC: 35 U/L (ref 1–32)
BASOPHILS # BLD AUTO: 0.02 10*3/MM3 (ref 0–0.2)
BASOPHILS NFR BLD AUTO: 0.4 % (ref 0–1.5)
BILIRUB SERPL-MCNC: 0.5 MG/DL (ref 0–1.2)
BUN SERPL-MCNC: 5 MG/DL (ref 6–20)
BUN/CREAT SERPL: 8.8 (ref 7–25)
CALCIUM SPEC-SCNC: 8.9 MG/DL (ref 8.6–10.5)
CHLORIDE SERPL-SCNC: 109 MMOL/L (ref 98–107)
CO2 SERPL-SCNC: 23.1 MMOL/L (ref 22–29)
CREAT SERPL-MCNC: 0.57 MG/DL (ref 0.57–1)
DEPRECATED RDW RBC AUTO: 48.4 FL (ref 37–54)
EGFRCR SERPLBLD CKD-EPI 2021: 112.3 ML/MIN/1.73
EOSINOPHIL # BLD AUTO: 0.13 10*3/MM3 (ref 0–0.4)
EOSINOPHIL NFR BLD AUTO: 2.6 % (ref 0.3–6.2)
ERYTHROCYTE [DISTWIDTH] IN BLOOD BY AUTOMATED COUNT: 13.6 % (ref 12.3–15.4)
GLOBULIN UR ELPH-MCNC: 2.6 GM/DL
GLUCOSE SERPL-MCNC: 91 MG/DL (ref 65–99)
HCT VFR BLD AUTO: 45.7 % (ref 34–46.6)
HGB BLD-MCNC: 14.5 G/DL (ref 12–15.9)
HOLD SPECIMEN: NORMAL
IMM GRANULOCYTES # BLD AUTO: 0.01 10*3/MM3 (ref 0–0.05)
IMM GRANULOCYTES NFR BLD AUTO: 0.2 % (ref 0–0.5)
LYMPHOCYTES # BLD AUTO: 1.88 10*3/MM3 (ref 0.7–3.1)
LYMPHOCYTES NFR BLD AUTO: 36.9 % (ref 19.6–45.3)
MCH RBC QN AUTO: 30.3 PG (ref 26.6–33)
MCHC RBC AUTO-ENTMCNC: 31.7 G/DL (ref 31.5–35.7)
MCV RBC AUTO: 95.4 FL (ref 79–97)
MONOCYTES # BLD AUTO: 0.29 10*3/MM3 (ref 0.1–0.9)
MONOCYTES NFR BLD AUTO: 5.7 % (ref 5–12)
NEUTROPHILS NFR BLD AUTO: 2.76 10*3/MM3 (ref 1.7–7)
NEUTROPHILS NFR BLD AUTO: 54.2 % (ref 42.7–76)
NRBC BLD AUTO-RTO: 0 /100 WBC (ref 0–0.2)
PLATELET # BLD AUTO: 158 10*3/MM3 (ref 140–450)
PMV BLD AUTO: 10.2 FL (ref 6–12)
POTASSIUM SERPL-SCNC: 4 MMOL/L (ref 3.5–5.2)
PROT SERPL-MCNC: 6.6 G/DL (ref 6–8.5)
RBC # BLD AUTO: 4.79 10*6/MM3 (ref 3.77–5.28)
SODIUM SERPL-SCNC: 140 MMOL/L (ref 136–145)
WBC NRBC COR # BLD: 5.09 10*3/MM3 (ref 3.4–10.8)
WHOLE BLOOD HOLD SPECIMEN: NORMAL

## 2023-02-06 PROCEDURE — 85025 COMPLETE CBC W/AUTO DIFF WBC: CPT | Performed by: NURSE PRACTITIONER

## 2023-02-06 PROCEDURE — 99281 EMR DPT VST MAYX REQ PHY/QHP: CPT

## 2023-02-06 PROCEDURE — 80053 COMPREHEN METABOLIC PANEL: CPT | Performed by: NURSE PRACTITIONER

## 2023-02-06 PROCEDURE — 36415 COLL VENOUS BLD VENIPUNCTURE: CPT

## 2023-02-06 NOTE — ED NOTES
MEDICAL SCREENING:    Reason for Visit: Migraine    Patient initially seen in triage.  The patient was advised further evaluation and diagnostic testing will be needed, some of the treatment and testing will be initiated in the lobby in order to begin the process.  The patient will be returned to the waiting area for the time being and possibly be re-assessed by a subsequent ED provider.  The patient will be brought back to the treatment area in as timely manner as possible.       Tami Bianchi, APRN  02/06/23 2487

## 2023-02-09 ENCOUNTER — HOSPITAL ENCOUNTER (EMERGENCY)
Facility: HOSPITAL | Age: 48
Discharge: HOME | End: 2023-02-09
Attending: STUDENT IN AN ORGANIZED HEALTH CARE EDUCATION/TRAINING PROGRAM | Admitting: STUDENT IN AN ORGANIZED HEALTH CARE EDUCATION/TRAINING PROGRAM
Payer: COMMERCIAL

## 2023-02-09 VITALS
BODY MASS INDEX: 19.4 KG/M2 | SYSTOLIC BLOOD PRESSURE: 112 MMHG | DIASTOLIC BLOOD PRESSURE: 76 MMHG | RESPIRATION RATE: 18 BRPM | HEART RATE: 74 BPM | TEMPERATURE: 98.1 F | OXYGEN SATURATION: 98 % | HEIGHT: 68 IN | WEIGHT: 128 LBS

## 2023-02-09 DIAGNOSIS — G43.809 OTHER MIGRAINE WITHOUT STATUS MIGRAINOSUS, NOT INTRACTABLE: Primary | ICD-10-CM

## 2023-02-09 LAB
ALBUMIN SERPL-MCNC: 3.7 G/DL (ref 3.5–5.2)
ALBUMIN/GLOB SERPL: 1.5 G/DL
ALP SERPL-CCNC: 66 U/L (ref 39–117)
ALT SERPL W P-5'-P-CCNC: 44 U/L (ref 1–33)
ANION GAP SERPL CALCULATED.3IONS-SCNC: 7.2 MMOL/L (ref 5–15)
AST SERPL-CCNC: 37 U/L (ref 1–32)
BASOPHILS # BLD AUTO: 0.01 10*3/MM3 (ref 0–0.2)
BASOPHILS NFR BLD AUTO: 0.3 % (ref 0–1.5)
BILIRUB SERPL-MCNC: 0.4 MG/DL (ref 0–1.2)
BILIRUB UR QL STRIP: NEGATIVE
BUN SERPL-MCNC: 4 MG/DL (ref 6–20)
BUN/CREAT SERPL: 7.5 (ref 7–25)
CALCIUM SPEC-SCNC: 8.5 MG/DL (ref 8.6–10.5)
CHLORIDE SERPL-SCNC: 107 MMOL/L (ref 98–107)
CLARITY UR: CLEAR
CO2 SERPL-SCNC: 26.8 MMOL/L (ref 22–29)
COLOR UR: YELLOW
CREAT SERPL-MCNC: 0.53 MG/DL (ref 0.57–1)
DEPRECATED RDW RBC AUTO: 45.9 FL (ref 37–54)
EGFRCR SERPLBLD CKD-EPI 2021: 114.2 ML/MIN/1.73
EOSINOPHIL # BLD AUTO: 0.14 10*3/MM3 (ref 0–0.4)
EOSINOPHIL NFR BLD AUTO: 3.6 % (ref 0.3–6.2)
ERYTHROCYTE [DISTWIDTH] IN BLOOD BY AUTOMATED COUNT: 13.6 % (ref 12.3–15.4)
GLOBULIN UR ELPH-MCNC: 2.4 GM/DL
GLUCOSE SERPL-MCNC: 98 MG/DL (ref 65–99)
GLUCOSE UR STRIP-MCNC: NEGATIVE MG/DL
HCT VFR BLD AUTO: 39.5 % (ref 34–46.6)
HGB BLD-MCNC: 13.2 G/DL (ref 12–15.9)
HGB UR QL STRIP.AUTO: NEGATIVE
HOLD SPECIMEN: NORMAL
HOLD SPECIMEN: NORMAL
IMM GRANULOCYTES # BLD AUTO: 0.01 10*3/MM3 (ref 0–0.05)
IMM GRANULOCYTES NFR BLD AUTO: 0.3 % (ref 0–0.5)
KETONES UR QL STRIP: NEGATIVE
LEUKOCYTE ESTERASE UR QL STRIP.AUTO: NEGATIVE
LYMPHOCYTES # BLD AUTO: 1.48 10*3/MM3 (ref 0.7–3.1)
LYMPHOCYTES NFR BLD AUTO: 38.5 % (ref 19.6–45.3)
MCH RBC QN AUTO: 30.8 PG (ref 26.6–33)
MCHC RBC AUTO-ENTMCNC: 33.4 G/DL (ref 31.5–35.7)
MCV RBC AUTO: 92.1 FL (ref 79–97)
MONOCYTES # BLD AUTO: 0.26 10*3/MM3 (ref 0.1–0.9)
MONOCYTES NFR BLD AUTO: 6.8 % (ref 5–12)
NEUTROPHILS NFR BLD AUTO: 1.94 10*3/MM3 (ref 1.7–7)
NEUTROPHILS NFR BLD AUTO: 50.5 % (ref 42.7–76)
NITRITE UR QL STRIP: NEGATIVE
NRBC BLD AUTO-RTO: 0 /100 WBC (ref 0–0.2)
PH UR STRIP.AUTO: 6.5 [PH] (ref 5–8)
PLATELET # BLD AUTO: 160 10*3/MM3 (ref 140–450)
PMV BLD AUTO: 10.3 FL (ref 6–12)
POTASSIUM SERPL-SCNC: 4 MMOL/L (ref 3.5–5.2)
PROT SERPL-MCNC: 6.1 G/DL (ref 6–8.5)
PROT UR QL STRIP: NEGATIVE
RBC # BLD AUTO: 4.29 10*6/MM3 (ref 3.77–5.28)
SODIUM SERPL-SCNC: 141 MMOL/L (ref 136–145)
SP GR UR STRIP: 1.01 (ref 1–1.03)
UROBILINOGEN UR QL STRIP: NORMAL
WBC NRBC COR # BLD: 3.84 10*3/MM3 (ref 3.4–10.8)
WHOLE BLOOD HOLD SPECIMEN: NORMAL

## 2023-02-09 PROCEDURE — P9612 CATHETERIZE FOR URINE SPEC: HCPCS

## 2023-02-09 PROCEDURE — 99284 EMERGENCY DEPT VISIT MOD MDM: CPT

## 2023-02-09 PROCEDURE — 25010000002 DEXAMETHASONE SODIUM PHOSPHATE 10 MG/ML SOLUTION: Performed by: STUDENT IN AN ORGANIZED HEALTH CARE EDUCATION/TRAINING PROGRAM

## 2023-02-09 PROCEDURE — 25010000002 ONDANSETRON PER 1 MG: Performed by: STUDENT IN AN ORGANIZED HEALTH CARE EDUCATION/TRAINING PROGRAM

## 2023-02-09 PROCEDURE — 80053 COMPREHEN METABOLIC PANEL: CPT | Performed by: STUDENT IN AN ORGANIZED HEALTH CARE EDUCATION/TRAINING PROGRAM

## 2023-02-09 PROCEDURE — 25010000002 DIPHENHYDRAMINE PER 50 MG: Performed by: STUDENT IN AN ORGANIZED HEALTH CARE EDUCATION/TRAINING PROGRAM

## 2023-02-09 PROCEDURE — 25010000002 HEPARIN LOCK FLUSH PER 10 UNITS: Performed by: STUDENT IN AN ORGANIZED HEALTH CARE EDUCATION/TRAINING PROGRAM

## 2023-02-09 PROCEDURE — 85025 COMPLETE CBC W/AUTO DIFF WBC: CPT | Performed by: STUDENT IN AN ORGANIZED HEALTH CARE EDUCATION/TRAINING PROGRAM

## 2023-02-09 PROCEDURE — 25010000002 MORPHINE PER 10 MG: Performed by: STUDENT IN AN ORGANIZED HEALTH CARE EDUCATION/TRAINING PROGRAM

## 2023-02-09 PROCEDURE — 96375 TX/PRO/DX INJ NEW DRUG ADDON: CPT

## 2023-02-09 PROCEDURE — 96374 THER/PROPH/DIAG INJ IV PUSH: CPT

## 2023-02-09 PROCEDURE — 36415 COLL VENOUS BLD VENIPUNCTURE: CPT

## 2023-02-09 PROCEDURE — 81003 URINALYSIS AUTO W/O SCOPE: CPT | Performed by: STUDENT IN AN ORGANIZED HEALTH CARE EDUCATION/TRAINING PROGRAM

## 2023-02-09 RX ORDER — SODIUM CHLORIDE 0.9 % (FLUSH) 0.9 %
10 SYRINGE (ML) INJECTION AS NEEDED
Status: DISCONTINUED | OUTPATIENT
Start: 2023-02-09 | End: 2023-02-09 | Stop reason: HOSPADM

## 2023-02-09 RX ORDER — DIPHENHYDRAMINE HYDROCHLORIDE 50 MG/ML
25 INJECTION INTRAMUSCULAR; INTRAVENOUS ONCE
Status: COMPLETED | OUTPATIENT
Start: 2023-02-09 | End: 2023-02-09

## 2023-02-09 RX ORDER — DEXAMETHASONE SODIUM PHOSPHATE 10 MG/ML
10 INJECTION, SOLUTION INTRAMUSCULAR; INTRAVENOUS ONCE
Status: COMPLETED | OUTPATIENT
Start: 2023-02-09 | End: 2023-02-09

## 2023-02-09 RX ORDER — BUTALBITAL, ACETAMINOPHEN AND CAFFEINE 50; 325; 40 MG/1; MG/1; MG/1
1 TABLET ORAL ONCE
Status: COMPLETED | OUTPATIENT
Start: 2023-02-09 | End: 2023-02-09

## 2023-02-09 RX ORDER — ONDANSETRON 2 MG/ML
4 INJECTION INTRAMUSCULAR; INTRAVENOUS ONCE
Status: COMPLETED | OUTPATIENT
Start: 2023-02-09 | End: 2023-02-09

## 2023-02-09 RX ORDER — HEPARIN SODIUM (PORCINE) LOCK FLUSH IV SOLN 100 UNIT/ML 100 UNIT/ML
300 SOLUTION INTRAVENOUS ONCE
Status: COMPLETED | OUTPATIENT
Start: 2023-02-09 | End: 2023-02-09

## 2023-02-09 RX ADMIN — SODIUM CHLORIDE 1000 ML: 9 INJECTION, SOLUTION INTRAVENOUS at 14:44

## 2023-02-09 RX ADMIN — BUTALBITAL, ACETAMINOPHEN, AND CAFFEINE 1 TABLET: 50; 325; 40 TABLET ORAL at 15:23

## 2023-02-09 RX ADMIN — Medication 300 UNITS: at 15:23

## 2023-02-09 RX ADMIN — DIPHENHYDRAMINE HYDROCHLORIDE 25 MG: 50 INJECTION, SOLUTION INTRAMUSCULAR; INTRAVENOUS at 15:23

## 2023-02-09 RX ADMIN — MORPHINE SULFATE 4 MG: 4 INJECTION, SOLUTION INTRAMUSCULAR; INTRAVENOUS at 14:28

## 2023-02-09 RX ADMIN — DEXAMETHASONE SODIUM PHOSPHATE 10 MG: 10 INJECTION INTRAMUSCULAR; INTRAVENOUS at 14:28

## 2023-02-09 RX ADMIN — ONDANSETRON 4 MG: 2 INJECTION INTRAMUSCULAR; INTRAVENOUS at 14:27

## 2023-02-09 NOTE — ED PROVIDER NOTES
[facility]  emergency department encounter        Pt Name: Susanna Camilo  MRN: [id number]  Birthdate 1975  Date of evaluation: 2/9/2023    Chief Complaint   Patient presents with   • Headache             HISTORY OF PRESENT ILLNESS:   Susanna Camilo is a 48 y.o. female well known to this facility presents for recurrent migraine headache, similar to prior. Pt reports they are stress related. Onset a few hours prior to arrival after finding out her grandchild had Angelman syndrome.              PCP: Mabel Patterson APRN          REVIEW OF SYSTEMS:     Review of Systems   Constitutional: Negative for fever.   HENT: Negative for congestion and rhinorrhea.    Respiratory: Negative for cough and shortness of breath.    Cardiovascular: Negative for chest pain.   Gastrointestinal: Negative for abdominal pain, nausea and vomiting.   Neurological: Positive for headaches.   Psychiatric/Behavioral: Negative for confusion.       Review of systems otherwise as per HPI.          PREVIOUS HISTORY:         Past Medical History:   Diagnosis Date   • Abdominal pain    • Abdominal swelling    • Hoyos's disease    • Bipolar 1 disorder (HCC)    • Brain tumor (benign) (HCC)    • Brain tumor (HCC)     R Frontal Lobe per pt   • Brain tumor (HCC) 2014   • Constipation    • DDD (degenerative disc disease), cervical 05/29/2017   • DDD (degenerative disc disease), cervical    • Diarrhea    • Fibromyalgia    • IBS (irritable bowel syndrome)    • Migraine    • Nausea & vomiting    • PONV (postoperative nausea and vomiting)    • PTSD (post-traumatic stress disorder)    • Rectal bleeding            Past Surgical History:   Procedure Laterality Date   • ANAL SCOPE N/A 7/28/2016    Procedure: ANAL SCOPE;  Surgeon: Kael Lopez MD;  Location: [facility];  Service:    • APPENDECTOMY     • COLONOSCOPY N/A 6/30/2016    Procedure: COLONOSCOPY  CPTCODE:74260;  Surgeon: Jose Antonio Belle III, MD;  Location: [facility] OR;  Service:     • COLONOSCOPY N/A 2016    Procedure: COLONOSCOPY (24008) CPT;  Surgeon: Jose Antonio Belle III, MD;  Location: [facility] OR;  Service:    • CYSTOSCOPY RETROGRADE PYELOGRAM Bilateral 2017    Procedure: CYSTOSCOPY RETROGRADE PYELOGRAM;  Surgeon: Mu Blunt MD;  Location: [facility] OR;  Service:    • ENDOSCOPY N/A 2016    Procedure: ESOPHAGOGASTRODUODENOSCOPY WITH BIOPSY  CPTCODE:57296;  Surgeon: Jose Antonio Belle III, MD;  Location: [facility] OR;  Service:    • HEMORRHOIDECTOMY N/A 2016    Procedure: HEMORRHOID STAPLING;  Surgeon: Kael Lopez MD;  Location: [facility] OR;  Service:    • HYSTERECTOMY     • KNEE SURGERY     • LAPAROSCOPIC SALPINGOOPHERECTOMY     • PORTACATH PLACEMENT N/A 2017    Procedure: INSERTION OF PORTACATH;  Surgeon: Celso Arredondo MD;  Location: [facility] OR;  Service:    • SHOULDER SURGERY      3 times           Social History     Socioeconomic History   • Marital status:    Tobacco Use   • Smoking status: Former     Packs/day: 1.00     Years: 20.00     Pack years: 20.00     Types: Cigarettes     Quit date: 2015     Years since quittin.2   • Smokeless tobacco: Never   Vaping Use   • Vaping Use: Never used   Substance and Sexual Activity   • Alcohol use: No   • Drug use: Yes     Types: Marijuana     Comment: for pain   • Sexual activity: Defer     Birth control/protection: Surgical           Family History   Problem Relation Age of Onset   • Crohn's disease Other    • Hypertension Other    • Diabetes Other    • Irritable bowel syndrome Other    • No Known Problems Father    • No Known Problems Mother          Current Outpatient Medications   Medication Instructions   • albuterol (PROVENTIL HFA;VENTOLIN HFA) 108 (90 Base) MCG/ACT inhaler 2 puffs, Inhalation, 2 Times Daily   • Azelastine HCl 137 MCG/SPRAY solution 1 spray, Nasal, As Needed   • busPIRone (BUSPAR) 15 mg, Oral, 3 Times Daily   • ciprofloxacin (CIPRO) 500 mg, Oral, 2 Times Daily   •  diclofenac (VOLTAREN) 1 % gel gel No dose, route, or frequency recorded.   • divalproex (DEPAKOTE) 500 mg, Oral, 3 Times Daily   • docusate sodium (COLACE) 100 mg, Oral, 2 Times Daily   • docusate sodium (COLACE) 100 mg, Oral, 2 Times Daily PRN   • fluticasone (VERAMYST) 27.5 MCG/SPRAY nasal spray 2 sprays, Nasal, Daily   • megestrol (MEGACE) 20 mg, Oral, Daily   • methylPREDNISolone (MEDROL) 4 MG dose pack Take as directed on package instructions.   • metroNIDAZOLE (FLAGYL) 500 mg, Oral, 3 Times Daily   • montelukast (SINGULAIR) 10 mg, Oral, Nightly   • oxyCODONE-acetaminophen (Percocet)  MG per tablet 1 tablet, Oral, Every 6 Hours PRN   • oxyCODONE-acetaminophen (PERCOCET) 5-325 MG per tablet 1 tablet, Oral, Every 8 Hours PRN   • pantoprazole (PROTONIX) 40 mg, Oral, 2 Times Daily PRN   • phenazopyridine (PYRIDIUM) 100 mg, Oral, 3 Times Daily PRN   • polyethylene glycol (MIRALAX) 17 g, Oral, Daily   • promethazine (PHENERGAN) 25 mg, Oral, Every 6 Hours PRN   • sertraline (ZOLOFT) 100 mg, Oral, 2 Times Daily   • SUMAtriptan (IMITREX) 6 mg, Subcutaneous, Once   • terazosin (HYTRIN) 2 mg, Oral, Nightly   • traMADol (ULTRAM) 50 MG tablet No dose, route, or frequency recorded.         Allergies:  Ativan [lorazepam], Sulfa antibiotics, Sulfa antibiotics, Reglan [metoclopramide], Compazine [prochlorperazine edisylate], Demerol [meperidine], Droperidol, Metoclopramide, Toradol [ketorolac tromethamine], Toradol [ketorolac tromethamine], Zofran [ondansetron hcl], and Zosyn [piperacillin sod-tazobactam so]          PHYSICAL EXAM:     Patient Vitals for the past 24 hrs:   BP Temp Temp src Pulse Resp SpO2 Height Weight   02/09/23 1530 112/76 98.1 °F (36.7 °C) Oral 74 18 98 % -- --   02/09/23 1445 121/85 -- -- 75 -- 98 % -- --   02/09/23 1430 130/78 -- -- 76 -- 95 % -- --   02/09/23 1415 119/65 -- -- 78 -- 98 % -- --   02/09/23 1330 129/78 -- -- -- -- -- -- --   02/09/23 1328 -- 97.3 °F (36.3 °C) Infrared 88 18 98 % 172.7  "cm (68\") 58.1 kg (128 lb)       Physical Exam  Vitals and nursing note reviewed.   Constitutional:       General: She is not in acute distress.  HENT:      Head: Normocephalic and atraumatic.   Eyes:      Extraocular Movements: Extraocular movements intact.      Conjunctiva/sclera: Conjunctivae normal.   Pulmonary:      Effort: Pulmonary effort is normal. No respiratory distress.   Abdominal:      General: Abdomen is flat. There is no distension.   Musculoskeletal:         General: No deformity. Normal range of motion.      Cervical back: Normal range of motion. No rigidity.   Skin:     Coloration: Skin is not jaundiced.      Findings: No rash.   Neurological:      General: No focal deficit present.      Mental Status: She is alert and oriented to person, place, and time.   Psychiatric:         Mood and Affect: Mood normal.         Behavior: Behavior normal.             COMPLETED DIAGNOSTIC STUDIES AND INTERVENTIONS:     Lab Results (last 24 hours)     Procedure Component Value Units Date/Time    Urinalysis With Microscopic If Indicated (No Culture) - Straight Cath [[id number]]  (Normal) Collected: 02/09/23 1413    Specimen: Urine from Straight Cath Updated: 02/09/23 1431     Color, UA Yellow     Appearance, UA Clear     pH, UA 6.5     Specific Gravity, UA 1.008     Glucose, UA Negative     Ketones, UA Negative     Bilirubin, UA Negative     Blood, UA Negative     Protein, UA Negative     Leuk Esterase, UA Negative     Nitrite, UA Negative     Urobilinogen, UA 0.2 E.U./dL    Narrative:      Urine microscopic not indicated.    CBC & Differential [[id number]]  (Normal) Collected: 02/09/23 1413    Specimen: Blood Updated: 02/09/23 1434    Narrative:      The following orders were created for panel order CBC & Differential.  Procedure                               Abnormality         Status                     ---------                               -----------         ------                     CBC Auto " Differential[[id number]]        Normal              Final result                 Please view results for these tests on the individual orders.    Comprehensive Metabolic Panel [[id number]]  (Abnormal) Collected: 02/09/23 1413    Specimen: Blood Updated: 02/09/23 1443     Glucose 98 mg/dL      BUN 4 mg/dL      Creatinine 0.53 mg/dL      Sodium 141 mmol/L      Potassium 4.0 mmol/L      Chloride 107 mmol/L      CO2 26.8 mmol/L      Calcium 8.5 mg/dL      Total Protein 6.1 g/dL      Albumin 3.7 g/dL      ALT (SGPT) 44 U/L      AST (SGOT) 37 U/L      Alkaline Phosphatase 66 U/L      Total Bilirubin 0.4 mg/dL      Globulin 2.4 gm/dL      A/G Ratio 1.5 g/dL      BUN/Creatinine Ratio 7.5     Anion Gap 7.2 mmol/L      eGFR 114.2 mL/min/1.73     Narrative:      GFR Normal >60  Chronic Kidney Disease <60  Kidney Failure <15      CBC Auto Differential [[id number]]  (Normal) Collected: 02/09/23 1413    Specimen: Blood Updated: 02/09/23 1434     WBC 3.84 10*3/mm3      RBC 4.29 10*6/mm3      Hemoglobin 13.2 g/dL      Hematocrit 39.5 %      MCV 92.1 fL      MCH 30.8 pg      MCHC 33.4 g/dL      RDW 13.6 %      RDW-SD 45.9 fl      MPV 10.3 fL      Platelets 160 10*3/mm3      Neutrophil % 50.5 %      Lymphocyte % 38.5 %      Monocyte % 6.8 %      Eosinophil % 3.6 %      Basophil % 0.3 %      Immature Grans % 0.3 %      Neutrophils, Absolute 1.94 10*3/mm3      Lymphocytes, Absolute 1.48 10*3/mm3      Monocytes, Absolute 0.26 10*3/mm3      Eosinophils, Absolute 0.14 10*3/mm3      Basophils, Absolute 0.01 10*3/mm3      Immature Grans, Absolute 0.01 10*3/mm3      nRBC 0.0 /100 WBC             No orders to display         New Medications Ordered This Visit   Medications   • morphine injection 4 mg   • ondansetron (ZOFRAN) injection 4 mg   • sodium chloride 0.9 % bolus 1,000 mL   • dexamethasone sodium phosphate injection 10 mg   • butalbital-acetaminophen-caffeine (FIORICET, ESGIC) -40 MG per tablet 1 tablet   • diphenhydrAMINE  (BENADRYL) injection 25 mg   • heparin injection 300 Units         Procedures            MEDICAL DECISION-MAKING AND ED COURSE:     ED Course as of 02/09/23 2032   Thu Feb 09, 2023 2031 48F w/ migraine headache, similar to prior, no significant change requiring work-up. Treated with IV medications, pt reported improvement and discharged for outpatient PCP f/u or return as needed for new onset or worsening symptoms. [KP]      ED Course User Index  [KP] Ankit Gleason MD       ?      FINAL IMPRESSION:       1. Other migraine without status migrainosus, not intractable         The complaints listed here are new problems to this examiner.       Medication List      No changes were made to your prescriptions during this visit.         FOLLOW-UP  Mabel Patterson, APRN  [address] [address]  [phone]    Schedule an appointment as soon as possible for a visit       [facility] Emergency Department  [address] [place] [id number] [phone]    If symptoms worsen      DISPOSITION  ED Disposition     ED Disposition   Discharge    Condition   Stable    Comment   --                 Please note that portions of this note were completed with a voice recognition program.      Ankit Gleason MD  20:32 EST  2/9/2023             Ankit Gleason MD  02/09/23 2032

## 2023-02-21 ENCOUNTER — HOSPITAL ENCOUNTER (EMERGENCY)
Facility: HOSPITAL | Age: 48
Discharge: HOME | End: 2023-02-21
Attending: STUDENT IN AN ORGANIZED HEALTH CARE EDUCATION/TRAINING PROGRAM | Admitting: STUDENT IN AN ORGANIZED HEALTH CARE EDUCATION/TRAINING PROGRAM
Payer: COMMERCIAL

## 2023-02-21 VITALS
HEART RATE: 71 BPM | RESPIRATION RATE: 18 BRPM | OXYGEN SATURATION: 100 % | SYSTOLIC BLOOD PRESSURE: 111 MMHG | DIASTOLIC BLOOD PRESSURE: 76 MMHG | BODY MASS INDEX: 19.4 KG/M2 | HEIGHT: 68 IN | WEIGHT: 128 LBS | TEMPERATURE: 97.7 F

## 2023-02-21 DIAGNOSIS — A08.4 GASTROENTERITIS AND COLITIS, VIRAL: Primary | ICD-10-CM

## 2023-02-21 LAB
ALBUMIN SERPL-MCNC: 4 G/DL (ref 3.5–5.2)
ALBUMIN/GLOB SERPL: 1.5 G/DL
ALP SERPL-CCNC: 71 U/L (ref 39–117)
ALT SERPL W P-5'-P-CCNC: 56 U/L (ref 1–33)
ANION GAP SERPL CALCULATED.3IONS-SCNC: 9.7 MMOL/L (ref 5–15)
AST SERPL-CCNC: 46 U/L (ref 1–32)
BASOPHILS # BLD AUTO: 0.02 10*3/MM3 (ref 0–0.2)
BASOPHILS NFR BLD AUTO: 0.4 % (ref 0–1.5)
BILIRUB SERPL-MCNC: 0.5 MG/DL (ref 0–1.2)
BILIRUB UR QL STRIP: NEGATIVE
BUN SERPL-MCNC: 4 MG/DL (ref 6–20)
BUN/CREAT SERPL: 6 (ref 7–25)
CALCIUM SPEC-SCNC: 9.2 MG/DL (ref 8.6–10.5)
CHLORIDE SERPL-SCNC: 108 MMOL/L (ref 98–107)
CLARITY UR: CLEAR
CO2 SERPL-SCNC: 23.3 MMOL/L (ref 22–29)
COLOR UR: YELLOW
CREAT SERPL-MCNC: 0.67 MG/DL (ref 0.57–1)
CRP SERPL-MCNC: <0.3 MG/DL (ref 0–0.5)
DEPRECATED RDW RBC AUTO: 47.8 FL (ref 37–54)
EGFRCR SERPLBLD CKD-EPI 2021: 108 ML/MIN/1.73
EOSINOPHIL # BLD AUTO: 0.14 10*3/MM3 (ref 0–0.4)
EOSINOPHIL NFR BLD AUTO: 2.7 % (ref 0.3–6.2)
ERYTHROCYTE [DISTWIDTH] IN BLOOD BY AUTOMATED COUNT: 13.8 % (ref 12.3–15.4)
GLOBULIN UR ELPH-MCNC: 2.6 GM/DL
GLUCOSE SERPL-MCNC: 90 MG/DL (ref 65–99)
GLUCOSE UR STRIP-MCNC: NEGATIVE MG/DL
HCT VFR BLD AUTO: 46.6 % (ref 34–46.6)
HGB BLD-MCNC: 15.3 G/DL (ref 12–15.9)
HGB UR QL STRIP.AUTO: NEGATIVE
HOLD SPECIMEN: NORMAL
HOLD SPECIMEN: NORMAL
IMM GRANULOCYTES # BLD AUTO: 0.01 10*3/MM3 (ref 0–0.05)
IMM GRANULOCYTES NFR BLD AUTO: 0.2 % (ref 0–0.5)
KETONES UR QL STRIP: NEGATIVE
LEUKOCYTE ESTERASE UR QL STRIP.AUTO: NEGATIVE
LIPASE SERPL-CCNC: 47 U/L (ref 13–60)
LYMPHOCYTES # BLD AUTO: 1.63 10*3/MM3 (ref 0.7–3.1)
LYMPHOCYTES NFR BLD AUTO: 31.7 % (ref 19.6–45.3)
MCH RBC QN AUTO: 31 PG (ref 26.6–33)
MCHC RBC AUTO-ENTMCNC: 32.8 G/DL (ref 31.5–35.7)
MCV RBC AUTO: 94.5 FL (ref 79–97)
MONOCYTES # BLD AUTO: 0.25 10*3/MM3 (ref 0.1–0.9)
MONOCYTES NFR BLD AUTO: 4.9 % (ref 5–12)
NEUTROPHILS NFR BLD AUTO: 3.09 10*3/MM3 (ref 1.7–7)
NEUTROPHILS NFR BLD AUTO: 60.1 % (ref 42.7–76)
NITRITE UR QL STRIP: NEGATIVE
NRBC BLD AUTO-RTO: 0 /100 WBC (ref 0–0.2)
PH UR STRIP.AUTO: 7 [PH] (ref 5–8)
PLATELET # BLD AUTO: 143 10*3/MM3 (ref 140–450)
PMV BLD AUTO: 10.2 FL (ref 6–12)
POTASSIUM SERPL-SCNC: 4.1 MMOL/L (ref 3.5–5.2)
PROT SERPL-MCNC: 6.6 G/DL (ref 6–8.5)
PROT UR QL STRIP: NEGATIVE
RBC # BLD AUTO: 4.93 10*6/MM3 (ref 3.77–5.28)
SODIUM SERPL-SCNC: 141 MMOL/L (ref 136–145)
SP GR UR STRIP: 1.01 (ref 1–1.03)
UROBILINOGEN UR QL STRIP: NORMAL
WBC NRBC COR # BLD: 5.14 10*3/MM3 (ref 3.4–10.8)
WHOLE BLOOD HOLD COAG: NORMAL
WHOLE BLOOD HOLD SPECIMEN: NORMAL

## 2023-02-21 PROCEDURE — 83690 ASSAY OF LIPASE: CPT | Performed by: PHYSICIAN ASSISTANT

## 2023-02-21 PROCEDURE — 36415 COLL VENOUS BLD VENIPUNCTURE: CPT

## 2023-02-21 PROCEDURE — 63710000001 ONDANSETRON ODT 4 MG TABLET DISPERSIBLE: Performed by: STUDENT IN AN ORGANIZED HEALTH CARE EDUCATION/TRAINING PROGRAM

## 2023-02-21 PROCEDURE — 86140 C-REACTIVE PROTEIN: CPT | Performed by: PHYSICIAN ASSISTANT

## 2023-02-21 PROCEDURE — 85025 COMPLETE CBC W/AUTO DIFF WBC: CPT | Performed by: PHYSICIAN ASSISTANT

## 2023-02-21 PROCEDURE — 81003 URINALYSIS AUTO W/O SCOPE: CPT | Performed by: PHYSICIAN ASSISTANT

## 2023-02-21 PROCEDURE — 99283 EMERGENCY DEPT VISIT LOW MDM: CPT

## 2023-02-21 PROCEDURE — 80053 COMPREHEN METABOLIC PANEL: CPT | Performed by: PHYSICIAN ASSISTANT

## 2023-02-21 RX ORDER — OXYCODONE AND ACETAMINOPHEN 10; 325 MG/1; MG/1
1 TABLET ORAL ONCE
Status: COMPLETED | OUTPATIENT
Start: 2023-02-21 | End: 2023-02-21

## 2023-02-21 RX ORDER — ONDANSETRON 4 MG/1
8 TABLET, ORALLY DISINTEGRATING ORAL EVERY 8 HOURS PRN
Qty: 30 TABLET | Refills: 0 | Status: SHIPPED | OUTPATIENT
Start: 2023-02-21

## 2023-02-21 RX ORDER — ONDANSETRON 4 MG/1
8 TABLET, ORALLY DISINTEGRATING ORAL ONCE
Status: COMPLETED | OUTPATIENT
Start: 2023-02-21 | End: 2023-02-21

## 2023-02-21 RX ORDER — PROCHLORPERAZINE MALEATE 10 MG
10 TABLET ORAL ONCE
Status: DISCONTINUED | OUTPATIENT
Start: 2023-02-21 | End: 2023-02-21

## 2023-02-21 RX ADMIN — ONDANSETRON 8 MG: 4 TABLET, ORALLY DISINTEGRATING ORAL at 16:13

## 2023-02-21 RX ADMIN — OXYCODONE HYDROCHLORIDE AND ACETAMINOPHEN 1 TABLET: 10; 325 TABLET ORAL at 16:14

## 2023-02-25 ENCOUNTER — HOSPITAL ENCOUNTER (EMERGENCY)
Facility: HOSPITAL | Age: 48
Discharge: HOME | End: 2023-02-25
Attending: EMERGENCY MEDICINE | Admitting: EMERGENCY MEDICINE
Payer: COMMERCIAL

## 2023-02-25 VITALS
SYSTOLIC BLOOD PRESSURE: 120 MMHG | BODY MASS INDEX: 19.4 KG/M2 | OXYGEN SATURATION: 98 % | HEART RATE: 66 BPM | DIASTOLIC BLOOD PRESSURE: 65 MMHG | TEMPERATURE: 97.5 F | HEIGHT: 68 IN | WEIGHT: 128 LBS | RESPIRATION RATE: 18 BRPM

## 2023-02-25 DIAGNOSIS — R10.9 ACUTE RIGHT FLANK PAIN: Primary | ICD-10-CM

## 2023-02-25 DIAGNOSIS — R31.9 HEMATURIA, UNSPECIFIED TYPE: ICD-10-CM

## 2023-02-25 LAB
ALBUMIN SERPL-MCNC: 4.3 G/DL (ref 3.5–5.2)
ALBUMIN/GLOB SERPL: 1.6 G/DL
ALP SERPL-CCNC: 72 U/L (ref 39–117)
ALT SERPL W P-5'-P-CCNC: 54 U/L (ref 1–33)
ANION GAP SERPL CALCULATED.3IONS-SCNC: 7.9 MMOL/L (ref 5–15)
AST SERPL-CCNC: 44 U/L (ref 1–32)
BACTERIA UR QL AUTO: ABNORMAL /HPF
BASOPHILS # BLD AUTO: 0.02 10*3/MM3 (ref 0–0.2)
BASOPHILS NFR BLD AUTO: 0.4 % (ref 0–1.5)
BILIRUB SERPL-MCNC: 0.5 MG/DL (ref 0–1.2)
BILIRUB UR QL STRIP: NEGATIVE
BUN SERPL-MCNC: 7 MG/DL (ref 6–20)
BUN/CREAT SERPL: 10.4 (ref 7–25)
CALCIUM SPEC-SCNC: 9.6 MG/DL (ref 8.6–10.5)
CHLORIDE SERPL-SCNC: 107 MMOL/L (ref 98–107)
CLARITY UR: ABNORMAL
CO2 SERPL-SCNC: 28.1 MMOL/L (ref 22–29)
COLOR UR: ABNORMAL
CREAT SERPL-MCNC: 0.67 MG/DL (ref 0.57–1)
CRP SERPL-MCNC: <0.3 MG/DL (ref 0–0.5)
DEPRECATED RDW RBC AUTO: 47.3 FL (ref 37–54)
EGFRCR SERPLBLD CKD-EPI 2021: 108 ML/MIN/1.73
EOSINOPHIL # BLD AUTO: 0.18 10*3/MM3 (ref 0–0.4)
EOSINOPHIL NFR BLD AUTO: 3.6 % (ref 0.3–6.2)
ERYTHROCYTE [DISTWIDTH] IN BLOOD BY AUTOMATED COUNT: 14 % (ref 12.3–15.4)
GLOBULIN UR ELPH-MCNC: 2.7 GM/DL
GLUCOSE SERPL-MCNC: 91 MG/DL (ref 65–99)
GLUCOSE UR STRIP-MCNC: NEGATIVE MG/DL
HCT VFR BLD AUTO: 44.1 % (ref 34–46.6)
HGB BLD-MCNC: 14.5 G/DL (ref 12–15.9)
HGB UR QL STRIP.AUTO: ABNORMAL
HYALINE CASTS UR QL AUTO: ABNORMAL /LPF
IMM GRANULOCYTES # BLD AUTO: 0.01 10*3/MM3 (ref 0–0.05)
IMM GRANULOCYTES NFR BLD AUTO: 0.2 % (ref 0–0.5)
KETONES UR QL STRIP: NEGATIVE
LEUKOCYTE ESTERASE UR QL STRIP.AUTO: ABNORMAL
LYMPHOCYTES # BLD AUTO: 2.08 10*3/MM3 (ref 0.7–3.1)
LYMPHOCYTES NFR BLD AUTO: 41.9 % (ref 19.6–45.3)
MCH RBC QN AUTO: 30.3 PG (ref 26.6–33)
MCHC RBC AUTO-ENTMCNC: 32.9 G/DL (ref 31.5–35.7)
MCV RBC AUTO: 92.3 FL (ref 79–97)
MONOCYTES # BLD AUTO: 0.31 10*3/MM3 (ref 0.1–0.9)
MONOCYTES NFR BLD AUTO: 6.2 % (ref 5–12)
NEUTROPHILS NFR BLD AUTO: 2.37 10*3/MM3 (ref 1.7–7)
NEUTROPHILS NFR BLD AUTO: 47.7 % (ref 42.7–76)
NITRITE UR QL STRIP: NEGATIVE
NRBC BLD AUTO-RTO: 0 /100 WBC (ref 0–0.2)
PH UR STRIP.AUTO: >=9 [PH] (ref 5–8)
PLATELET # BLD AUTO: 154 10*3/MM3 (ref 140–450)
PMV BLD AUTO: 10.7 FL (ref 6–12)
POTASSIUM SERPL-SCNC: 4.3 MMOL/L (ref 3.5–5.2)
PROT SERPL-MCNC: 7 G/DL (ref 6–8.5)
PROT UR QL STRIP: NEGATIVE
RBC # BLD AUTO: 4.78 10*6/MM3 (ref 3.77–5.28)
RBC # UR STRIP: ABNORMAL /HPF
REF LAB TEST METHOD: ABNORMAL
SODIUM SERPL-SCNC: 143 MMOL/L (ref 136–145)
SP GR UR STRIP: 1.01 (ref 1–1.03)
SQUAMOUS #/AREA URNS HPF: ABNORMAL /HPF
UROBILINOGEN UR QL STRIP: ABNORMAL
WBC # UR STRIP: ABNORMAL /HPF
WBC NRBC COR # BLD: 4.97 10*3/MM3 (ref 3.4–10.8)

## 2023-02-25 PROCEDURE — 25010000002 MORPHINE PER 10 MG: Performed by: EMERGENCY MEDICINE

## 2023-02-25 PROCEDURE — 85025 COMPLETE CBC W/AUTO DIFF WBC: CPT | Performed by: PHYSICIAN ASSISTANT

## 2023-02-25 PROCEDURE — 63710000001 PROMETHAZINE PER 25 MG: Performed by: PHYSICIAN ASSISTANT

## 2023-02-25 PROCEDURE — 99283 EMERGENCY DEPT VISIT LOW MDM: CPT

## 2023-02-25 PROCEDURE — 81001 URINALYSIS AUTO W/SCOPE: CPT | Performed by: PHYSICIAN ASSISTANT

## 2023-02-25 PROCEDURE — 96374 THER/PROPH/DIAG INJ IV PUSH: CPT

## 2023-02-25 PROCEDURE — 86140 C-REACTIVE PROTEIN: CPT | Performed by: PHYSICIAN ASSISTANT

## 2023-02-25 PROCEDURE — 96376 TX/PRO/DX INJ SAME DRUG ADON: CPT

## 2023-02-25 PROCEDURE — 80053 COMPREHEN METABOLIC PANEL: CPT | Performed by: PHYSICIAN ASSISTANT

## 2023-02-25 RX ORDER — PROMETHAZINE HYDROCHLORIDE 25 MG/1
25 TABLET ORAL ONCE
Status: COMPLETED | OUTPATIENT
Start: 2023-02-25 | End: 2023-02-25

## 2023-02-25 RX ADMIN — MORPHINE SULFATE 4 MG: 4 INJECTION, SOLUTION INTRAMUSCULAR; INTRAVENOUS at 13:40

## 2023-02-25 RX ADMIN — SODIUM CHLORIDE 1000 ML: 9 INJECTION, SOLUTION INTRAVENOUS at 12:41

## 2023-02-25 RX ADMIN — PROMETHAZINE HYDROCHLORIDE 25 MG: 25 TABLET ORAL at 12:43

## 2023-02-25 RX ADMIN — MORPHINE SULFATE 4 MG: 4 INJECTION, SOLUTION INTRAMUSCULAR; INTRAVENOUS at 12:42

## 2023-02-28 ENCOUNTER — OFFICE VISIT (OUTPATIENT)
Dept: UROLOGY | Facility: CLINIC | Age: 48
End: 2023-02-28
Payer: COMMERCIAL

## 2023-02-28 VITALS — BODY MASS INDEX: 18.94 KG/M2 | HEIGHT: 68 IN | WEIGHT: 125 LBS

## 2023-02-28 DIAGNOSIS — N35.028 OTHER POST-TRAUMATIC URETHRAL STRICTURE, FEMALE: Primary | ICD-10-CM

## 2023-02-28 DIAGNOSIS — F31.9 BIPOLAR 1 DISORDER: ICD-10-CM

## 2023-02-28 DIAGNOSIS — N23 RENAL COLIC: ICD-10-CM

## 2023-02-28 DIAGNOSIS — R35.0 FREQUENCY OF MICTURITION: ICD-10-CM

## 2023-02-28 PROCEDURE — 53661 DILATION OF URETHRA: CPT | Performed by: UROLOGY

## 2023-02-28 RX ORDER — PHENAZOPYRIDINE HYDROCHLORIDE 100 MG/1
100 TABLET, FILM COATED ORAL 3 TIMES DAILY PRN
Qty: 30 TABLET | Refills: 1 | Status: SHIPPED | OUTPATIENT
Start: 2023-02-28

## 2023-02-28 RX ORDER — OXYCODONE AND ACETAMINOPHEN 10; 325 MG/1; MG/1
1 TABLET ORAL EVERY 6 HOURS PRN
Qty: 20 TABLET | Refills: 0 | Status: SHIPPED | OUTPATIENT
Start: 2023-02-28 | End: 2023-03-28 | Stop reason: SDUPTHER

## 2023-02-28 RX ORDER — GENTAMICIN SULFATE 40 MG/ML
80 INJECTION, SOLUTION INTRAMUSCULAR; INTRAVENOUS ONCE
Status: COMPLETED | OUTPATIENT
Start: 2023-02-28 | End: 2023-02-28

## 2023-02-28 RX ORDER — MEGESTROL ACETATE 20 MG/1
20 TABLET ORAL DAILY
Qty: 30 TABLET | Refills: 3 | Status: SHIPPED | OUTPATIENT
Start: 2023-02-28

## 2023-02-28 RX ADMIN — GENTAMICIN SULFATE 80 MG: 40 INJECTION, SOLUTION INTRAMUSCULAR; INTRAVENOUS at 11:07

## 2023-03-02 NOTE — ED PROVIDER NOTES
Subjective   History of Present Illness     Susanna is a 49 yo with hx of abdominal pain for x3d w/ associated non bloody diarrhea. 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 Patient has been evaluated in ED multiple times and has had multiple CT scans for recurrent issues. Patient also reports non bloody non biliious emesis. Denies any fevers, chest pain, dsypnea or other complains.     Review of Systems   Constitutional: Negative.  Negative for activity change and fever.   HENT: Negative.  Negative for congestion.    Respiratory: Negative.  Negative for cough and shortness of breath.    Cardiovascular: Negative.  Negative for chest pain.   Gastrointestinal: Positive for abdominal pain, diarrhea, nausea and vomiting.   Endocrine: Negative.    Genitourinary: Negative.  Negative for dysuria.   Skin: Negative.    Neurological: Negative.  Negative for dizziness, weakness and light-headedness.   Psychiatric/Behavioral: Negative.    All other systems reviewed and are negative.      Past Medical History:   Diagnosis Date   • Abdominal pain    • Abdominal swelling    • Hoyos's disease    • Bipolar 1 disorder (HCC)    • Brain tumor (benign) (HCC)    • Brain tumor (HCC)     R Frontal Lobe per pt   • Brain tumor (HCC) 2014   • Constipation    • DDD (degenerative disc disease), cervical 05/29/2017   • DDD (degenerative disc disease), cervical    • Diarrhea    • Fibromyalgia    • IBS (irritable bowel syndrome)    • Migraine    • Nausea & vomiting    • PONV (postoperative nausea and vomiting)    • PTSD (post-traumatic stress disorder)    • Rectal bleeding        Allergies   Allergen Reactions   • Ativan [Lorazepam] Hallucinations     confusion   • Sulfa Antibiotics Shortness Of Breath and Swelling   • Sulfa Antibiotics Anaphylaxis   • Reglan [Metoclopramide] Angioedema   • Compazine [Prochlorperazine Edisylate] Hives   • Demerol [Meperidine] Hives   • Droperidol Itching   • Metoclopramide Swelling   • Toradol [Ketorolac  Tromethamine] Hives and Itching   • Toradol [Ketorolac Tromethamine] Hives   • Zofran [Ondansetron Hcl] Rash   • Zosyn [Piperacillin Sod-Tazobactam So] Hives       Past Surgical History:   Procedure Laterality Date   • ANAL SCOPE N/A 2016    Procedure: ANAL SCOPE;  Surgeon: Kael Lopez MD;  Location: [facility] OR;  Service:    • APPENDECTOMY     • COLONOSCOPY N/A 2016    Procedure: COLONOSCOPY  CPTCODE:53414;  Surgeon: Jose Antonio Belle III, MD;  Location: [facility] OR;  Service:    • COLONOSCOPY N/A 2016    Procedure: COLONOSCOPY (59744) CPT;  Surgeon: Jose Antonio Belle III, MD;  Location: [facility] OR;  Service:    • CYSTOSCOPY RETROGRADE PYELOGRAM Bilateral 2017    Procedure: CYSTOSCOPY RETROGRADE PYELOGRAM;  Surgeon: Mu Blunt MD;  Location: [facility] OR;  Service:    • ENDOSCOPY N/A 2016    Procedure: ESOPHAGOGASTRODUODENOSCOPY WITH BIOPSY  CPTCODE:05653;  Surgeon: Jose Antonio Belle III, MD;  Location: [facility] OR;  Service:    • HEMORRHOIDECTOMY N/A 2016    Procedure: HEMORRHOID STAPLING;  Surgeon: Kael Lopez MD;  Location: [facility] OR;  Service:    • HYSTERECTOMY     • KNEE SURGERY     • LAPAROSCOPIC SALPINGOOPHERECTOMY     • PORTACATH PLACEMENT N/A 2017    Procedure: INSERTION OF PORTACATH;  Surgeon: Celso Arredondo MD;  Location: [facility] OR;  Service:    • SHOULDER SURGERY      3 times       Family History   Problem Relation Age of Onset   • Crohn's disease Other    • Hypertension Other    • Diabetes Other    • Irritable bowel syndrome Other    • No Known Problems Father    • No Known Problems Mother        Social History     Socioeconomic History   • Marital status:    Tobacco Use   • Smoking status: Former     Packs/day: 1.00     Years: 20.00     Pack years: 20.00     Types: Cigarettes     Quit date: 2015     Years since quittin.3   • Smokeless tobacco: Never   Vaping Use   • Vaping Use: Never used   Substance and Sexual Activity   •  Alcohol use: No   • Drug use: Yes     Types: Marijuana     Comment: for pain   • Sexual activity: Defer     Birth control/protection: Surgical           Objective   Physical Exam  Constitutional:       Appearance: Normal appearance.   HENT:      Head: Normocephalic and atraumatic.      Right Ear: Tympanic membrane normal.      Left Ear: Tympanic membrane normal.      Nose: Nose normal. No congestion or rhinorrhea.      Mouth/Throat:      Mouth: Mucous membranes are moist.      Pharynx: No oropharyngeal exudate or posterior oropharyngeal erythema.   Eyes:      Extraocular Movements: Extraocular movements intact.      Pupils: Pupils are equal, round, and reactive to light.   Cardiovascular:      Rate and Rhythm: Normal rate and regular rhythm.   Pulmonary:      Effort: Pulmonary effort is normal. No respiratory distress.      Breath sounds: Normal breath sounds.   Abdominal:      General: Abdomen is flat. Bowel sounds are normal. There is no distension.   Musculoskeletal:         General: No swelling. Normal range of motion.      Cervical back: Normal range of motion. No rigidity or tenderness.   Skin:     General: Skin is warm.      Capillary Refill: Capillary refill takes less than 2 seconds.      Coloration: Skin is not jaundiced or pale.   Neurological:      General: No focal deficit present.      Mental Status: She is alert and oriented to person, place, and time.         Procedures           ED Course                                           Medical Decision Making  Susanna is a 47 yo presenting with N/V/D, hx of recurrent colitis per patient with multiple CT scans. Patient is afebrile and hemodynamically stable on arrival. Physical exam reassuring, abdomen is non peritonitic no rebound or guarding. Patient is on chronic pain meds at home requesting her home dose of oxy. Patient is given percocet and zofran for symptomatic relief. Basic labs, Lipase, lactic acid, UA are non actionable. Given reassuring exam and  benign workup do not see utility in repeating CT scan again. Patient's significant other and patient report they would rather not have repeat scan. Agreeable given findings, furthermore patient is still having bowel movements low suspicion for obstructive process. Patient instructed to fu w/ pcp in 2-3d and to consider GI specialist given chronicity of symptoms. Discharged in stable condition with zofran for outpatient management. Of Note concern for dependence issues would consider holding strong pain meds in future.     Gastroenteritis and colitis, viral: complicated acute illness or injury  Amount and/or Complexity of Data Reviewed  Labs: ordered.      Risk  Prescription drug management.          Final diagnoses:   Gastroenteritis and colitis, viral       ED Disposition  ED Disposition     ED Disposition   Discharge    Condition   Stable    Comment   --             Mabel Patterson, APRN  [address]  [address]  [place] [address]  [phone]    Go in 2 days  Medical evaluation         Medication List      New Prescriptions    ondansetron ODT 4 MG disintegrating tablet  Commonly known as: ZOFRAN-ODT  Place 2 tablets on the tongue Every 8 (Eight) Hours As Needed for Nausea or Vomiting.           Where to Get Your Medications      These medications were sent to [facility] - [place], [place] - [address] [place] [address] - [phone]  - [phone] [address] [address]    Phone: [phone]   · ondansetron ODT 4 MG disintegrating tablet          Marlyn Lazo MD  03/01/23 4818

## 2023-03-16 ENCOUNTER — HOSPITAL ENCOUNTER (EMERGENCY)
Facility: HOSPITAL | Age: 48
Discharge: HOME | End: 2023-03-16
Attending: EMERGENCY MEDICINE | Admitting: EMERGENCY MEDICINE
Payer: COMMERCIAL

## 2023-03-16 ENCOUNTER — APPOINTMENT (OUTPATIENT)
Dept: CT IMAGING | Facility: HOSPITAL | Age: 48
End: 2023-03-16
Payer: COMMERCIAL

## 2023-03-16 VITALS
OXYGEN SATURATION: 98 % | HEIGHT: 68 IN | TEMPERATURE: 97.9 F | WEIGHT: 126 LBS | RESPIRATION RATE: 20 BRPM | HEART RATE: 7 BPM | DIASTOLIC BLOOD PRESSURE: 62 MMHG | BODY MASS INDEX: 19.1 KG/M2 | SYSTOLIC BLOOD PRESSURE: 120 MMHG

## 2023-03-16 DIAGNOSIS — R31.9 URINARY TRACT INFECTION WITH HEMATURIA, SITE UNSPECIFIED: Primary | ICD-10-CM

## 2023-03-16 DIAGNOSIS — R10.9 ABDOMINAL PAIN, UNSPECIFIED ABDOMINAL LOCATION: ICD-10-CM

## 2023-03-16 DIAGNOSIS — N39.0 URINARY TRACT INFECTION WITH HEMATURIA, SITE UNSPECIFIED: Primary | ICD-10-CM

## 2023-03-16 LAB
ALBUMIN SERPL-MCNC: 4.4 G/DL (ref 3.5–5.2)
ALBUMIN/GLOB SERPL: 1.4 G/DL
ALP SERPL-CCNC: 69 U/L (ref 39–117)
ALT SERPL W P-5'-P-CCNC: 52 U/L (ref 1–33)
ANION GAP SERPL CALCULATED.3IONS-SCNC: 11.1 MMOL/L (ref 5–15)
AST SERPL-CCNC: 44 U/L (ref 1–32)
BACTERIA UR QL AUTO: ABNORMAL /HPF
BASOPHILS # BLD AUTO: 0.02 10*3/MM3 (ref 0–0.2)
BASOPHILS NFR BLD AUTO: 0.3 % (ref 0–1.5)
BILIRUB SERPL-MCNC: 0.9 MG/DL (ref 0–1.2)
BILIRUB UR QL STRIP: NEGATIVE
BUN SERPL-MCNC: 6 MG/DL (ref 6–20)
BUN/CREAT SERPL: 8.5 (ref 7–25)
CALCIUM SPEC-SCNC: 9.6 MG/DL (ref 8.6–10.5)
CHLORIDE SERPL-SCNC: 107 MMOL/L (ref 98–107)
CLARITY UR: ABNORMAL
CO2 SERPL-SCNC: 21.9 MMOL/L (ref 22–29)
COLOR UR: ABNORMAL
CREAT SERPL-MCNC: 0.71 MG/DL (ref 0.57–1)
CRP SERPL-MCNC: <0.3 MG/DL (ref 0–0.5)
DEPRECATED RDW RBC AUTO: 48.4 FL (ref 37–54)
EGFRCR SERPLBLD CKD-EPI 2021: 105 ML/MIN/1.73
EOSINOPHIL # BLD AUTO: 0.12 10*3/MM3 (ref 0–0.4)
EOSINOPHIL NFR BLD AUTO: 1.7 % (ref 0.3–6.2)
ERYTHROCYTE [DISTWIDTH] IN BLOOD BY AUTOMATED COUNT: 13.6 % (ref 12.3–15.4)
GLOBULIN UR ELPH-MCNC: 3.2 GM/DL
GLUCOSE SERPL-MCNC: 89 MG/DL (ref 65–99)
GLUCOSE UR STRIP-MCNC: NEGATIVE MG/DL
HCT VFR BLD AUTO: 50 % (ref 34–46.6)
HGB BLD-MCNC: 16.1 G/DL (ref 12–15.9)
HGB UR QL STRIP.AUTO: ABNORMAL
HOLD SPECIMEN: NORMAL
HOLD SPECIMEN: NORMAL
HYALINE CASTS UR QL AUTO: ABNORMAL /LPF
IMM GRANULOCYTES # BLD AUTO: 0.02 10*3/MM3 (ref 0–0.05)
IMM GRANULOCYTES NFR BLD AUTO: 0.3 % (ref 0–0.5)
KETONES UR QL STRIP: NEGATIVE
LEUKOCYTE ESTERASE UR QL STRIP.AUTO: ABNORMAL
LYMPHOCYTES # BLD AUTO: 1.96 10*3/MM3 (ref 0.7–3.1)
LYMPHOCYTES NFR BLD AUTO: 27.6 % (ref 19.6–45.3)
MCH RBC QN AUTO: 30.7 PG (ref 26.6–33)
MCHC RBC AUTO-ENTMCNC: 32.2 G/DL (ref 31.5–35.7)
MCV RBC AUTO: 95.4 FL (ref 79–97)
MONOCYTES # BLD AUTO: 0.51 10*3/MM3 (ref 0.1–0.9)
MONOCYTES NFR BLD AUTO: 7.2 % (ref 5–12)
NEUTROPHILS NFR BLD AUTO: 4.48 10*3/MM3 (ref 1.7–7)
NEUTROPHILS NFR BLD AUTO: 62.9 % (ref 42.7–76)
NITRITE UR QL STRIP: NEGATIVE
NRBC BLD AUTO-RTO: 0 /100 WBC (ref 0–0.2)
PH UR STRIP.AUTO: 7 [PH] (ref 5–8)
PLATELET # BLD AUTO: 184 10*3/MM3 (ref 140–450)
PMV BLD AUTO: 10.1 FL (ref 6–12)
POTASSIUM SERPL-SCNC: 3.8 MMOL/L (ref 3.5–5.2)
PROT SERPL-MCNC: 7.6 G/DL (ref 6–8.5)
PROT UR QL STRIP: ABNORMAL
RBC # BLD AUTO: 5.24 10*6/MM3 (ref 3.77–5.28)
RBC # UR STRIP: ABNORMAL /HPF
REF LAB TEST METHOD: ABNORMAL
SODIUM SERPL-SCNC: 140 MMOL/L (ref 136–145)
SP GR UR STRIP: 1.01 (ref 1–1.03)
SQUAMOUS #/AREA URNS HPF: ABNORMAL /HPF
UROBILINOGEN UR QL STRIP: ABNORMAL
WBC # UR STRIP: ABNORMAL /HPF
WBC NRBC COR # BLD: 7.11 10*3/MM3 (ref 3.4–10.8)
WHOLE BLOOD HOLD COAG: NORMAL
WHOLE BLOOD HOLD SPECIMEN: NORMAL

## 2023-03-16 PROCEDURE — 80053 COMPREHEN METABOLIC PANEL: CPT | Performed by: PHYSICIAN ASSISTANT

## 2023-03-16 PROCEDURE — 25010000002 MORPHINE PER 10 MG: Performed by: EMERGENCY MEDICINE

## 2023-03-16 PROCEDURE — 96374 THER/PROPH/DIAG INJ IV PUSH: CPT

## 2023-03-16 PROCEDURE — 81001 URINALYSIS AUTO W/SCOPE: CPT | Performed by: PHYSICIAN ASSISTANT

## 2023-03-16 PROCEDURE — 25010000002 HEPARIN LOCK FLUSH PER 10 UNITS: Performed by: PHYSICIAN ASSISTANT

## 2023-03-16 PROCEDURE — 74176 CT ABD & PELVIS W/O CONTRAST: CPT

## 2023-03-16 PROCEDURE — 96376 TX/PRO/DX INJ SAME DRUG ADON: CPT

## 2023-03-16 PROCEDURE — 25010000002 ONDANSETRON PER 1 MG: Performed by: EMERGENCY MEDICINE

## 2023-03-16 PROCEDURE — 36415 COLL VENOUS BLD VENIPUNCTURE: CPT

## 2023-03-16 PROCEDURE — 85025 COMPLETE CBC W/AUTO DIFF WBC: CPT | Performed by: PHYSICIAN ASSISTANT

## 2023-03-16 PROCEDURE — 86140 C-REACTIVE PROTEIN: CPT | Performed by: PHYSICIAN ASSISTANT

## 2023-03-16 PROCEDURE — 99283 EMERGENCY DEPT VISIT LOW MDM: CPT

## 2023-03-16 PROCEDURE — 96375 TX/PRO/DX INJ NEW DRUG ADDON: CPT

## 2023-03-16 RX ORDER — ONDANSETRON 2 MG/ML
4 INJECTION INTRAMUSCULAR; INTRAVENOUS ONCE
Status: COMPLETED | OUTPATIENT
Start: 2023-03-16 | End: 2023-03-16

## 2023-03-16 RX ORDER — CEFDINIR 300 MG/1
300 CAPSULE ORAL 2 TIMES DAILY
Qty: 14 CAPSULE | Refills: 0 | Status: SHIPPED | OUTPATIENT
Start: 2023-03-16 | End: 2023-03-23

## 2023-03-16 RX ORDER — HEPARIN SODIUM (PORCINE) LOCK FLUSH IV SOLN 100 UNIT/ML 100 UNIT/ML
300 SOLUTION INTRAVENOUS ONCE
Status: COMPLETED | OUTPATIENT
Start: 2023-03-16 | End: 2023-03-16

## 2023-03-16 RX ORDER — PROMETHAZINE HYDROCHLORIDE 25 MG/1
25 TABLET ORAL EVERY 6 HOURS PRN
Qty: 15 TABLET | Refills: 0 | Status: SHIPPED | OUTPATIENT
Start: 2023-03-16

## 2023-03-16 RX ADMIN — MORPHINE SULFATE 4 MG: 4 INJECTION, SOLUTION INTRAMUSCULAR; INTRAVENOUS at 18:47

## 2023-03-16 RX ADMIN — MORPHINE SULFATE 4 MG: 4 INJECTION, SOLUTION INTRAMUSCULAR; INTRAVENOUS at 18:14

## 2023-03-16 RX ADMIN — ONDANSETRON 4 MG: 2 INJECTION INTRAMUSCULAR; INTRAVENOUS at 18:14

## 2023-03-16 RX ADMIN — Medication 300 UNITS: at 19:15

## 2023-03-16 NOTE — ED NOTES
Pt discharged home with caregiver pt instructed not to . Her significant other is driving. Reviewed all dc instructions she voiced understanding. Pt voiced understanding

## 2023-03-16 NOTE — ED NOTES
MEDICAL SCREENING:    Reason for Visit: hematuria, dysuria, and abdominal pain x 3 days.    Patient initially seen in triage.  The patient was advised further evaluation and diagnostic testing will be needed, some of the treatment and testing will be initiated in the lobby in order to begin the process.  The patient will be returned to the waiting area for the time being and possibly be re-assessed by a subsequent ED provider.  The patient will be brought back to the treatment area in as timely manner as possible.         Han Varghese, PA-C  03/16/23 4602

## 2023-03-16 NOTE — ED PROVIDER NOTES
Subjective   History of Present Illness  48-year-old female who presents to the emergency department chief complaint of abdominal pain and hematuria.  Patient has been seen at this facility several times previously.  She presents with similar complaints.  Patient complains of generalized abdominal cramping.  Patient has history of bipolar disorder, brain tumor, migraine, nausea vomiting.    History provided by:  Patient   used: No    Abdominal Pain  Pain location:  L flank  Pain radiates to:  Does not radiate  Pain severity:  Moderate  Onset quality:  Gradual  Duration:  2 days  Timing:  Intermittent  Progression:  Worsening  Chronicity:  New  Context: not alcohol use, not diet changes and not laxative use    Relieved by:  Nothing  Worsened by:  Nothing  Ineffective treatments:  None tried  Associated symptoms: chills, fatigue, hematuria, nausea and vomiting    Associated symptoms: no anorexia, no chest pain, no cough, no fever, no flatus, no hematemesis, no hematochezia, no sore throat and no vaginal bleeding    Risk factors: no alcohol abuse, no aspirin use, not elderly, has not had multiple surgeries, no NSAID use, not pregnant and no recent hospitalization        Review of Systems   Constitutional: Positive for chills and fatigue. Negative for fever.   HENT: Negative for drooling, ear discharge, facial swelling, hearing loss and sore throat.    Eyes: Negative.  Negative for pain, redness and itching.   Respiratory: Negative.  Negative for cough, chest tightness and stridor.    Cardiovascular: Negative.  Negative for chest pain, palpitations and leg swelling.   Gastrointestinal: Positive for abdominal distention, abdominal pain, nausea and vomiting. Negative for anorexia, flatus, hematemesis and hematochezia.   Endocrine: Negative.  Negative for heat intolerance, polydipsia and polyphagia.   Genitourinary: Positive for hematuria. Negative for vaginal bleeding.   Musculoskeletal: Negative.   Negative for back pain, gait problem, joint swelling and myalgias.   Skin: Negative.  Negative for color change, pallor and wound.   Neurological: Negative.    All other systems reviewed and are negative.      Past Medical History:   Diagnosis Date   • Abdominal pain    • Abdominal swelling    • Hoyos's disease    • Bipolar 1 disorder (HCC)    • Brain tumor (benign) (HCC)    • Brain tumor (HCC)     R Frontal Lobe per pt   • Brain tumor (HCC) 2014   • Constipation    • DDD (degenerative disc disease), cervical 05/29/2017   • DDD (degenerative disc disease), cervical    • Diarrhea    • Fibromyalgia    • IBS (irritable bowel syndrome)    • Migraine    • Nausea & vomiting    • PONV (postoperative nausea and vomiting)    • PTSD (post-traumatic stress disorder)    • Rectal bleeding        Allergies   Allergen Reactions   • Ativan [Lorazepam] Hallucinations     confusion   • Sulfa Antibiotics Shortness Of Breath and Swelling   • Sulfa Antibiotics Anaphylaxis   • Reglan [Metoclopramide] Angioedema   • Compazine [Prochlorperazine Edisylate] Hives   • Demerol [Meperidine] Hives   • Droperidol Itching   • Metoclopramide Swelling   • Toradol [Ketorolac Tromethamine] Hives and Itching   • Toradol [Ketorolac Tromethamine] Hives   • Zofran [Ondansetron Hcl] Rash   • Zosyn [Piperacillin Sod-Tazobactam So] Hives       Past Surgical History:   Procedure Laterality Date   • ANAL SCOPE N/A 7/28/2016    Procedure: ANAL SCOPE;  Surgeon: Kael Lopez MD;  Location: [facility] OR;  Service:    • APPENDECTOMY     • COLONOSCOPY N/A 6/30/2016    Procedure: COLONOSCOPY  CPTCODE:18427;  Surgeon: Jose Antonio Belle III, MD;  Location: [facility] OR;  Service:    • COLONOSCOPY N/A 7/7/2016    Procedure: COLONOSCOPY (11972) CPT;  Surgeon: Jose Antonio Belle III, MD;  Location: [facility] OR;  Service:    • CYSTOSCOPY RETROGRADE PYELOGRAM Bilateral 4/28/2017    Procedure: CYSTOSCOPY RETROGRADE PYELOGRAM;  Surgeon: Mu Blunt MD;  [facility] OR;  Service:    • ENDOSCOPY N/A 2016    Procedure: ESOPHAGOGASTRODUODENOSCOPY WITH BIOPSY  CPTCODE:99079;  Surgeon: Jose Antonio Belle III, MD;  Location: [facility] OR;  Service:    • HEMORRHOIDECTOMY N/A 2016    Procedure: HEMORRHOID STAPLING;  Surgeon: Kael Lopez MD;  Location: [facility] OR;  Service:    • HYSTERECTOMY     • KNEE SURGERY     • LAPAROSCOPIC SALPINGOOPHERECTOMY     • PORTACATH PLACEMENT N/A 2017    Procedure: INSERTION OF PORTACATH;  Surgeon: Celso Arredondo MD;  Location: [facility] OR;  Service:    • SHOULDER SURGERY      3 times       Family History   Problem Relation Age of Onset   • Crohn's disease Other    • Hypertension Other    • Diabetes Other    • Irritable bowel syndrome Other    • No Known Problems Father    • No Known Problems Mother        Social History     Socioeconomic History   • Marital status:    Tobacco Use   • Smoking status: Former     Packs/day: 1.00     Years: 20.00     Pack years: 20.00     Types: Cigarettes     Quit date: 2015     Years since quittin.3   • Smokeless tobacco: Never   Vaping Use   • Vaping Use: Never used   Substance and Sexual Activity   • Alcohol use: No   • Drug use: Yes     Types: Marijuana     Comment: for pain   • Sexual activity: Defer     Birth control/protection: Surgical           Objective   Physical Exam  Vitals and nursing note reviewed.   Constitutional:       General: She is not in acute distress.     Appearance: Normal appearance. She is well-developed and normal weight. She is not ill-appearing, toxic-appearing or diaphoretic.   HENT:      Head: Normocephalic and atraumatic.      Nose: Nose normal.      Mouth/Throat:      Mouth: Mucous membranes are moist.      Pharynx: Oropharynx is clear. No pharyngeal swelling or oropharyngeal exudate.   Eyes:      General: No scleral icterus.     Extraocular Movements: Extraocular movements intact.      Conjunctiva/sclera: Conjunctivae normal.      Pupils: Pupils are  equal, round, and reactive to light.   Cardiovascular:      Rate and Rhythm: Normal rate and regular rhythm.      Heart sounds: Normal heart sounds. No murmur heard.    No friction rub. No gallop.   Pulmonary:      Effort: Pulmonary effort is normal. No respiratory distress.      Breath sounds: Normal breath sounds. No stridor. No wheezing, rhonchi or rales.   Chest:      Chest wall: No tenderness.   Abdominal:      General: Abdomen is flat. Bowel sounds are normal. There is no distension or abdominal bruit. There are no signs of injury.      Palpations: Abdomen is soft. There is no shifting dullness, fluid wave, hepatomegaly, splenomegaly, mass or pulsatile mass.      Tenderness: There is generalized abdominal tenderness.      Hernia: No hernia is present. There is no hernia in the umbilical area, ventral area, left inguinal area, right femoral area, left femoral area or right inguinal area.   Musculoskeletal:         General: Normal range of motion.      Cervical back: Normal range of motion and neck supple.   Skin:     General: Skin is warm and dry.      Capillary Refill: Capillary refill takes less than 2 seconds.      Coloration: Skin is not cyanotic, jaundiced, mottled or pale.      Findings: No erythema or rash.   Neurological:      General: No focal deficit present.      Mental Status: She is alert and oriented to person, place, and time.      Cranial Nerves: No cranial nerve deficit.      Motor: No weakness.   Psychiatric:         Mood and Affect: Mood normal. Mood is not anxious or depressed.         Behavior: Behavior normal.         Thought Content: Thought content normal.         Judgment: Judgment normal.         Procedures           ED Course  ED Course as of 03/16/23 1829   Thu Mar 16, 2023   0234 IMPRESSION:  1. No acute abnormality within the abdomen or pelvis.  2. No visible nephrolithiasis or evidence of urinary obstruction.  3. Mild chronic bronchiectasis in the posterior right lower lobe.  4.  Mild chronic fracture deformity of the L2 vertebral body.    []      ED Course User Index  [] Mark Stubbs PA-C                                           [facility]    Final diagnoses:   Urinary tract infection with hematuria, site unspecified   Abdominal pain, unspecified abdominal location       ED Disposition  ED Disposition     ED Disposition   Discharge    Condition   Stable    Comment   --             Mu Blunt MD  [address] [facility]  [address]  [place] [id number]  [phone]    Call in 1 day           Medication List      New Prescriptions    cefdinir 300 MG capsule  Commonly known as: OMNICEF  Take 1 capsule by mouth 2 (Two) Times a Day for 7 days.        Changed    * promethazine 25 MG tablet  Commonly known as: PHENERGAN  Take 1 tablet by mouth Every 6 (Six) Hours As Needed for Nausea or Vomiting.  What changed: Another medication with the same name was added. Make sure you understand how and when to take each.     * promethazine 25 MG tablet  Commonly known as: PHENERGAN  Take 1 tablet by mouth Every 6 (Six) Hours As Needed for Nausea or Vomiting.  What changed: You were already taking a medication with the same name, and this prescription was added. Make sure you understand how and when to take each.         * This list has 2 medication(s) that are the same as other medications prescribed for you. Read the directions carefully, and ask your doctor or other care provider to review them with you.               Where to Get Your Medications      These medications were sent to [facility] [place], [place] - [address] [place] [address] - [phone]  - [phone] [address] [address]    Phone: [phone]   · cefdinir 300 MG capsule  · promethazine 25 MG tablet          Mark Stubbs PA-C  03/16/23 6269

## 2023-03-24 ENCOUNTER — HOSPITAL ENCOUNTER (EMERGENCY)
Facility: HOSPITAL | Age: 48
Discharge: HOME | End: 2023-03-24
Attending: STUDENT IN AN ORGANIZED HEALTH CARE EDUCATION/TRAINING PROGRAM | Admitting: STUDENT IN AN ORGANIZED HEALTH CARE EDUCATION/TRAINING PROGRAM
Payer: COMMERCIAL

## 2023-03-24 VITALS
HEIGHT: 68 IN | RESPIRATION RATE: 18 BRPM | SYSTOLIC BLOOD PRESSURE: 114 MMHG | HEART RATE: 79 BPM | DIASTOLIC BLOOD PRESSURE: 54 MMHG | WEIGHT: 130 LBS | TEMPERATURE: 97.7 F | OXYGEN SATURATION: 97 % | BODY MASS INDEX: 19.7 KG/M2

## 2023-03-24 DIAGNOSIS — R10.9 LEFT FLANK PAIN: Primary | ICD-10-CM

## 2023-03-24 LAB
ALBUMIN SERPL-MCNC: 4 G/DL (ref 3.5–5.2)
ALBUMIN/GLOB SERPL: 1.4 G/DL
ALP SERPL-CCNC: 63 U/L (ref 39–117)
ALT SERPL W P-5'-P-CCNC: 55 U/L (ref 1–33)
ANION GAP SERPL CALCULATED.3IONS-SCNC: 9.1 MMOL/L (ref 5–15)
AST SERPL-CCNC: 42 U/L (ref 1–32)
BACTERIA UR QL AUTO: ABNORMAL /HPF
BASOPHILS # BLD AUTO: 0.01 10*3/MM3 (ref 0–0.2)
BASOPHILS NFR BLD AUTO: 0.2 % (ref 0–1.5)
BILIRUB SERPL-MCNC: 0.7 MG/DL (ref 0–1.2)
BILIRUB UR QL STRIP: ABNORMAL
BUN SERPL-MCNC: 5 MG/DL (ref 6–20)
BUN/CREAT SERPL: 9.4 (ref 7–25)
CALCIUM SPEC-SCNC: 9.2 MG/DL (ref 8.6–10.5)
CHLORIDE SERPL-SCNC: 109 MMOL/L (ref 98–107)
CLARITY UR: ABNORMAL
CO2 SERPL-SCNC: 20.9 MMOL/L (ref 22–29)
COLOR UR: ABNORMAL
CREAT SERPL-MCNC: 0.53 MG/DL (ref 0.57–1)
DEPRECATED RDW RBC AUTO: 47 FL (ref 37–54)
EGFRCR SERPLBLD CKD-EPI 2021: 114.2 ML/MIN/1.73
EOSINOPHIL # BLD AUTO: 0.11 10*3/MM3 (ref 0–0.4)
EOSINOPHIL NFR BLD AUTO: 1.8 % (ref 0.3–6.2)
ERYTHROCYTE [DISTWIDTH] IN BLOOD BY AUTOMATED COUNT: 13.6 % (ref 12.3–15.4)
GLOBULIN UR ELPH-MCNC: 2.9 GM/DL
GLUCOSE SERPL-MCNC: 90 MG/DL (ref 65–99)
GLUCOSE UR STRIP-MCNC: NEGATIVE MG/DL
HCT VFR BLD AUTO: 44.8 % (ref 34–46.6)
HGB BLD-MCNC: 15 G/DL (ref 12–15.9)
HGB UR QL STRIP.AUTO: ABNORMAL
HOLD SPECIMEN: NORMAL
HOLD SPECIMEN: NORMAL
HYALINE CASTS UR QL AUTO: ABNORMAL /LPF
IMM GRANULOCYTES # BLD AUTO: 0.01 10*3/MM3 (ref 0–0.05)
IMM GRANULOCYTES NFR BLD AUTO: 0.2 % (ref 0–0.5)
KETONES UR QL STRIP: NEGATIVE
LEUKOCYTE ESTERASE UR QL STRIP.AUTO: ABNORMAL
LIPASE SERPL-CCNC: 30 U/L (ref 13–60)
LYMPHOCYTES # BLD AUTO: 2.06 10*3/MM3 (ref 0.7–3.1)
LYMPHOCYTES NFR BLD AUTO: 34.6 % (ref 19.6–45.3)
MCH RBC QN AUTO: 31.5 PG (ref 26.6–33)
MCHC RBC AUTO-ENTMCNC: 33.5 G/DL (ref 31.5–35.7)
MCV RBC AUTO: 94.1 FL (ref 79–97)
MONOCYTES # BLD AUTO: 0.39 10*3/MM3 (ref 0.1–0.9)
MONOCYTES NFR BLD AUTO: 6.5 % (ref 5–12)
NEUTROPHILS NFR BLD AUTO: 3.38 10*3/MM3 (ref 1.7–7)
NEUTROPHILS NFR BLD AUTO: 56.7 % (ref 42.7–76)
NITRITE UR QL STRIP: POSITIVE
NRBC BLD AUTO-RTO: 0 /100 WBC (ref 0–0.2)
PH UR STRIP.AUTO: 7 [PH] (ref 5–8)
PLATELET # BLD AUTO: 165 10*3/MM3 (ref 140–450)
PMV BLD AUTO: 10.2 FL (ref 6–12)
POTASSIUM SERPL-SCNC: 3.8 MMOL/L (ref 3.5–5.2)
PROT SERPL-MCNC: 6.9 G/DL (ref 6–8.5)
PROT UR QL STRIP: ABNORMAL
RBC # BLD AUTO: 4.76 10*6/MM3 (ref 3.77–5.28)
RBC # UR STRIP: ABNORMAL /HPF
REF LAB TEST METHOD: ABNORMAL
SODIUM SERPL-SCNC: 139 MMOL/L (ref 136–145)
SP GR UR STRIP: 1.02 (ref 1–1.03)
SQUAMOUS #/AREA URNS HPF: ABNORMAL /HPF
UROBILINOGEN UR QL STRIP: ABNORMAL
WBC # UR STRIP: ABNORMAL /HPF
WBC NRBC COR # BLD: 5.96 10*3/MM3 (ref 3.4–10.8)
WHOLE BLOOD HOLD COAG: NORMAL
WHOLE BLOOD HOLD SPECIMEN: NORMAL

## 2023-03-24 PROCEDURE — 85025 COMPLETE CBC W/AUTO DIFF WBC: CPT | Performed by: PHYSICIAN ASSISTANT

## 2023-03-24 PROCEDURE — 96376 TX/PRO/DX INJ SAME DRUG ADON: CPT

## 2023-03-24 PROCEDURE — 36415 COLL VENOUS BLD VENIPUNCTURE: CPT

## 2023-03-24 PROCEDURE — 96374 THER/PROPH/DIAG INJ IV PUSH: CPT

## 2023-03-24 PROCEDURE — 99283 EMERGENCY DEPT VISIT LOW MDM: CPT

## 2023-03-24 PROCEDURE — 87086 URINE CULTURE/COLONY COUNT: CPT | Performed by: PHYSICIAN ASSISTANT

## 2023-03-24 PROCEDURE — 81001 URINALYSIS AUTO W/SCOPE: CPT | Performed by: PHYSICIAN ASSISTANT

## 2023-03-24 PROCEDURE — 83690 ASSAY OF LIPASE: CPT | Performed by: PHYSICIAN ASSISTANT

## 2023-03-24 PROCEDURE — 25010000002 MORPHINE PER 10 MG: Performed by: STUDENT IN AN ORGANIZED HEALTH CARE EDUCATION/TRAINING PROGRAM

## 2023-03-24 PROCEDURE — 63710000001 PROMETHAZINE PER 25 MG: Performed by: PHYSICIAN ASSISTANT

## 2023-03-24 PROCEDURE — 80053 COMPREHEN METABOLIC PANEL: CPT | Performed by: PHYSICIAN ASSISTANT

## 2023-03-24 RX ORDER — SODIUM CHLORIDE 0.9 % (FLUSH) 0.9 %
10 SYRINGE (ML) INJECTION AS NEEDED
Status: DISCONTINUED | OUTPATIENT
Start: 2023-03-24 | End: 2023-03-24 | Stop reason: HOSPADM

## 2023-03-24 RX ORDER — MORPHINE SULFATE 2 MG/ML
2 INJECTION, SOLUTION INTRAMUSCULAR; INTRAVENOUS ONCE
Status: COMPLETED | OUTPATIENT
Start: 2023-03-24 | End: 2023-03-24

## 2023-03-24 RX ORDER — PROMETHAZINE HYDROCHLORIDE 25 MG/1
25 TABLET ORAL ONCE
Status: COMPLETED | OUTPATIENT
Start: 2023-03-24 | End: 2023-03-24

## 2023-03-24 RX ADMIN — SODIUM CHLORIDE 1000 ML: 9 INJECTION, SOLUTION INTRAVENOUS at 16:54

## 2023-03-24 RX ADMIN — PROMETHAZINE HYDROCHLORIDE 25 MG: 25 TABLET ORAL at 16:54

## 2023-03-24 RX ADMIN — MORPHINE SULFATE 2 MG: 2 INJECTION, SOLUTION INTRAMUSCULAR; INTRAVENOUS at 18:20

## 2023-03-24 RX ADMIN — MORPHINE SULFATE 2 MG: 2 INJECTION, SOLUTION INTRAMUSCULAR; INTRAVENOUS at 16:54

## 2023-03-24 NOTE — ED NOTES
MEDICAL SCREENING:    Reason for Visit: left Flank pain    Patient initially seen in triage.  The patient was advised further evaluation and diagnostic testing will be needed, some of the treatment and testing will be initiated in the lobby in order to begin the process.  The patient will be returned to the waiting area for the time being and possibly be re-assessed by a subsequent ED provider.  The patient will be brought back to the treatment area in as timely manner as possible.         Rowena Stubbs PA-C  03/24/23 1551

## 2023-03-24 NOTE — ED PROVIDER NOTES
Subjective   History of Present Illness  48-year-old female who presents to the ED today for left flank pain.  She reports that her pain started yesterday.  She states she is also having hematuria.  She states she did take some Pyridium today for her symptoms.  She states she has to have her urethra dilated every few weeks and is scheduled for this next week.  She states she is taken some Pyridium for her symptoms but started to have vomiting today while at work and her employer said she needed to come here for an evaluation before she could return back to work.  She denies any fever.    History provided by:  Patient  Flank Pain  Pain location:  L flank  Pain quality: sharp    Pain radiates to:  Does not radiate  Pain severity:  Severe  Onset quality:  Gradual  Duration:  1 day  Timing:  Constant  Progression:  Worsening  Chronicity:  Recurrent  Context: not trauma    Relieved by:  Nothing  Worsened by:  Nothing  Associated symptoms: hematuria, nausea and vomiting    Associated symptoms: no anorexia, no chest pain, no chills, no constipation, no cough, no diarrhea, no dysuria, no fatigue, no fever, no hematemesis, no hematochezia, no melena and no shortness of breath        Review of Systems   Constitutional: Negative.  Negative for chills, fatigue and fever.   HENT: Negative.    Eyes: Negative.    Respiratory: Negative.  Negative for cough and shortness of breath.    Cardiovascular: Negative for chest pain.   Gastrointestinal: Positive for nausea and vomiting. Negative for anorexia, constipation, diarrhea, hematemesis, hematochezia and melena.   Genitourinary: Positive for flank pain and hematuria. Negative for difficulty urinating and dysuria.   Musculoskeletal: Positive for back pain.   Skin: Negative.    Neurological: Negative.    Psychiatric/Behavioral: Negative.    All other systems reviewed and are negative.      Past Medical History:   Diagnosis Date   • Abdominal pain    • Abdominal swelling    • Hoyos's  disease    • Bipolar 1 disorder (HCC)    • Brain tumor (benign) (HCC)    • Brain tumor (HCC)     R Frontal Lobe per pt   • Brain tumor (HCC) 2014   • Constipation    • DDD (degenerative disc disease), cervical 05/29/2017   • DDD (degenerative disc disease), cervical    • Diarrhea    • Fibromyalgia    • IBS (irritable bowel syndrome)    • Migraine    • Nausea & vomiting    • PONV (postoperative nausea and vomiting)    • PTSD (post-traumatic stress disorder)    • Rectal bleeding        Allergies   Allergen Reactions   • Ativan [Lorazepam] Hallucinations     confusion   • Sulfa Antibiotics Shortness Of Breath and Swelling   • Sulfa Antibiotics Anaphylaxis   • Reglan [Metoclopramide] Angioedema   • Compazine [Prochlorperazine Edisylate] Hives   • Demerol [Meperidine] Hives   • Droperidol Itching   • Metoclopramide Swelling   • Toradol [Ketorolac Tromethamine] Hives and Itching   • Toradol [Ketorolac Tromethamine] Hives   • Zofran [Ondansetron Hcl] Rash   • Zosyn [Piperacillin Sod-Tazobactam So] Hives       Past Surgical History:   Procedure Laterality Date   • ANAL SCOPE N/A 7/28/2016    Procedure: ANAL SCOPE;  Surgeon: Kael Lopez MD;  Location: [facility] OR;  Service:    • APPENDECTOMY     • COLONOSCOPY N/A 6/30/2016    Procedure: COLONOSCOPY  CPTCODE:47777;  Surgeon: Jose Antonio Belle III, MD;  Location: [facility] OR;  Service:    • COLONOSCOPY N/A 7/7/2016    Procedure: COLONOSCOPY (33247) CPT;  Surgeon: Jose Antonio Belle III, MD;  Location: [facility] OR;  Service:    • CYSTOSCOPY RETROGRADE PYELOGRAM Bilateral 4/28/2017    Procedure: CYSTOSCOPY RETROGRADE PYELOGRAM;  Surgeon: Mu Blunt MD;  Location: [facility] OR;  Service:    • ENDOSCOPY N/A 6/30/2016    Procedure: ESOPHAGOGASTRODUODENOSCOPY WITH BIOPSY  CPTCODE:65686;  Surgeon: Jose Antonio Belle III, MD;  Location: [facility] OR;  Service:    • HEMORRHOIDECTOMY N/A 7/28/2016    Procedure: HEMORRHOID STAPLING;  Surgeon: Kael Lopez MD;  Location: [facility]  OR;  Service:    • HYSTERECTOMY     • KNEE SURGERY     • LAPAROSCOPIC SALPINGOOPHERECTOMY     • PORTACATH PLACEMENT N/A 2017    Procedure: INSERTION OF PORTACATH;  Surgeon: Celso Arredondo MD;  Location: [facility] OR;  Service:    • SHOULDER SURGERY      3 times       Family History   Problem Relation Age of Onset   • Crohn's disease Other    • Hypertension Other    • Diabetes Other    • Irritable bowel syndrome Other    • No Known Problems Father    • No Known Problems Mother        Social History     Socioeconomic History   • Marital status:    Tobacco Use   • Smoking status: Former     Packs/day: 1.00     Years: 20.00     Pack years: 20.00     Types: Cigarettes     Quit date: 2015     Years since quittin.3   • Smokeless tobacco: Never   Vaping Use   • Vaping Use: Never used   Substance and Sexual Activity   • Alcohol use: No   • Drug use: Yes     Types: Marijuana     Comment: for pain   • Sexual activity: Defer     Birth control/protection: Surgical           Objective   Physical Exam  Vitals and nursing note reviewed.   Constitutional:       General: She is not in acute distress.     Appearance: Normal appearance. She is not diaphoretic.   HENT:      Head: Normocephalic and atraumatic.      Right Ear: External ear normal.      Left Ear: External ear normal.   Eyes:      Conjunctiva/sclera: Conjunctivae normal.      Pupils: Pupils are equal, round, and reactive to light.   Cardiovascular:      Rate and Rhythm: Normal rate and regular rhythm.      Pulses: Normal pulses.      Heart sounds: Normal heart sounds.   Pulmonary:      Effort: Pulmonary effort is normal.      Breath sounds: Normal breath sounds.   Abdominal:      General: Bowel sounds are normal.      Palpations: Abdomen is soft.      Tenderness: There is abdominal tenderness (left flank area). There is no right CVA tenderness, left CVA tenderness, guarding or rebound.   Musculoskeletal:         General: Normal range of motion.       Cervical back: Normal range of motion and neck supple.   Skin:     General: Skin is warm and dry.      Capillary Refill: Capillary refill takes less than 2 seconds.   Neurological:      General: No focal deficit present.      Mental Status: She is alert and oriented to person, place, and time.   Psychiatric:         Mood and Affect: Mood normal.         Procedures        Results for orders placed or performed during the hospital encounter of 03/24/23   Comprehensive Metabolic Panel    Specimen: Hand, Left; Blood   Result Value Ref Range    Glucose 90 65 - 99 mg/dL    BUN 5 (L) 6 - 20 mg/dL    Creatinine 0.53 (L) 0.57 - 1.00 mg/dL    Sodium 139 136 - 145 mmol/L    Potassium 3.8 3.5 - 5.2 mmol/L    Chloride 109 (H) 98 - 107 mmol/L    CO2 20.9 (L) 22.0 - 29.0 mmol/L    Calcium 9.2 8.6 - 10.5 mg/dL    Total Protein 6.9 6.0 - 8.5 g/dL    Albumin 4.0 3.5 - 5.2 g/dL    ALT (SGPT) 55 (H) 1 - 33 U/L    AST (SGOT) 42 (H) 1 - 32 U/L    Alkaline Phosphatase 63 39 - 117 U/L    Total Bilirubin 0.7 0.0 - 1.2 mg/dL    Globulin 2.9 gm/dL    A/G Ratio 1.4 g/dL    BUN/Creatinine Ratio 9.4 7.0 - 25.0    Anion Gap 9.1 5.0 - 15.0 mmol/L    eGFR 114.2 >60.0 mL/min/1.73   Lipase    Specimen: Hand, Left; Blood   Result Value Ref Range    Lipase 30 13 - 60 U/L   Urinalysis With Culture If Indicated - Urine, Clean Catch    Specimen: Urine, Clean Catch   Result Value Ref Range    Color, UA Orange (A) Yellow, Straw    Appearance, UA Turbid (A) Clear    pH, UA 7.0 5.0 - 8.0    Specific Gravity, UA 1.019 1.005 - 1.030    Glucose, UA Negative Negative    Ketones, UA Negative Negative    Bilirubin, UA Small (1+) (A) Negative    Blood, UA Large (3+) (A) Negative    Protein, UA 30 mg/dL (1+) (A) Negative    Leuk Esterase, UA Small (1+) (A) Negative    Nitrite, UA Positive (A) Negative    Urobilinogen, UA 1.0 E.U./dL 0.2 - 1.0 E.U./dL   CBC Auto Differential    Specimen: Hand, Left; Blood   Result Value Ref Range    WBC 5.96 3.40 - 10.80 10*3/mm3     RBC 4.76 3.77 - 5.28 10*6/mm3    Hemoglobin 15.0 12.0 - 15.9 g/dL    Hematocrit 44.8 34.0 - 46.6 %    MCV 94.1 79.0 - 97.0 fL    MCH 31.5 26.6 - 33.0 pg    MCHC 33.5 31.5 - 35.7 g/dL    RDW 13.6 12.3 - 15.4 %    RDW-SD 47.0 37.0 - 54.0 fl    MPV 10.2 6.0 - 12.0 fL    Platelets 165 140 - 450 10*3/mm3    Neutrophil % 56.7 42.7 - 76.0 %    Lymphocyte % 34.6 19.6 - 45.3 %    Monocyte % 6.5 5.0 - 12.0 %    Eosinophil % 1.8 0.3 - 6.2 %    Basophil % 0.2 0.0 - 1.5 %    Immature Grans % 0.2 0.0 - 0.5 %    Neutrophils, Absolute 3.38 1.70 - 7.00 10*3/mm3    Lymphocytes, Absolute 2.06 0.70 - 3.10 10*3/mm3    Monocytes, Absolute 0.39 0.10 - 0.90 10*3/mm3    Eosinophils, Absolute 0.11 0.00 - 0.40 10*3/mm3    Basophils, Absolute 0.01 0.00 - 0.20 10*3/mm3    Immature Grans, Absolute 0.01 0.00 - 0.05 10*3/mm3    nRBC 0.0 0.0 - 0.2 /100 WBC   Urinalysis, Microscopic Only - Urine, Clean Catch    Specimen: Urine, Clean Catch   Result Value Ref Range    RBC, UA Too Numerous to Count (A) None Seen, 0-2 /HPF    WBC, UA 6-12 (A) None Seen, 0-2 /HPF    Bacteria, UA 1+ (A) None Seen /HPF    Squamous Epithelial Cells, UA 13-20 (A) None Seen, 0-2 /HPF    Hyaline Casts, UA 3-6 None Seen /LPF    Methodology Automated Microscopy    Green Top (Gel)   Result Value Ref Range    Extra Tube Hold for add-ons.    Lavender Top   Result Value Ref Range    Extra Tube hold for add-on    Gold Top - SST   Result Value Ref Range    Extra Tube Hold for add-ons.    Light Blue Top   Result Value Ref Range    Extra Tube Hold for add-ons.          ED Course                                           Medical Decision Making  48-year-old female presents to the ED today for left flank pain with hematuria.  She has these symptoms recurrently due to issues with urethral strictures.  She sees urology every few weeks.  She has an appointment next week.  She started to have vomiting today and her employer wanted her to come to the ER to be evaluated.  The patient received  IV fluids, nausea and pain medication in the ED.  She reports that she is feeling better.  She will follow-up outpatient with urology as scheduled and will return to the ED if symptoms change or worsen.    Left flank pain: complicated acute illness or injury  Amount and/or Complexity of Data Reviewed  Labs: ordered.      Risk  Prescription drug management.          Final diagnoses:   Left flank pain       ED Disposition  ED Disposition     ED Disposition   Discharge    Condition   Stable    Comment   --             Mabel Patterson, APRN  [address] [id number]  [phone]    Schedule an appointment as soon as possible for a visit in 3 days  As needed         Medication List      Stop    cefdinir 300 MG capsule  Commonly known as: Kristin Noland PA  03/24/23 [id number]

## 2023-03-25 LAB — BACTERIA SPEC AEROBE CULT: NO GROWTH

## 2023-03-28 ENCOUNTER — OFFICE VISIT (OUTPATIENT)
Dept: UROLOGY | Facility: CLINIC | Age: 48
End: 2023-03-28
Payer: COMMERCIAL

## 2023-03-28 VITALS
BODY MASS INDEX: 19.55 KG/M2 | WEIGHT: 129 LBS | HEIGHT: 68 IN | HEART RATE: 80 BPM | DIASTOLIC BLOOD PRESSURE: 60 MMHG | SYSTOLIC BLOOD PRESSURE: 115 MMHG

## 2023-03-28 DIAGNOSIS — N23 RENAL COLIC: ICD-10-CM

## 2023-03-28 DIAGNOSIS — R35.0 FREQUENCY OF MICTURITION: Primary | ICD-10-CM

## 2023-03-28 DIAGNOSIS — N35.028 OTHER POST-TRAUMATIC URETHRAL STRICTURE, FEMALE: ICD-10-CM

## 2023-03-28 PROCEDURE — 1159F MED LIST DOCD IN RCRD: CPT | Performed by: UROLOGY

## 2023-03-28 PROCEDURE — 53661 DILATION OF URETHRA: CPT | Performed by: UROLOGY

## 2023-03-28 PROCEDURE — 1160F RVW MEDS BY RX/DR IN RCRD: CPT | Performed by: UROLOGY

## 2023-03-28 RX ORDER — GENTAMICIN SULFATE 40 MG/ML
80 INJECTION, SOLUTION INTRAMUSCULAR; INTRAVENOUS ONCE
Status: COMPLETED | OUTPATIENT
Start: 2023-03-28 | End: 2023-03-28

## 2023-03-28 RX ORDER — OXYCODONE AND ACETAMINOPHEN 10; 325 MG/1; MG/1
1 TABLET ORAL EVERY 6 HOURS PRN
Qty: 20 TABLET | Refills: 0 | Status: SHIPPED | OUTPATIENT
Start: 2023-03-28

## 2023-03-28 RX ADMIN — GENTAMICIN SULFATE 80 MG: 40 INJECTION, SOLUTION INTRAMUSCULAR; INTRAVENOUS at 11:07

## 2023-03-28 NOTE — PROGRESS NOTES
Chief Complaint:      Chief Complaint   Patient presents with   • Urethra Stricture      Dilation        HPI:   48 y.o. female here with significant difficulty voiding needs to be dilated.    Past Medical History:     Past Medical History:   Diagnosis Date   • Abdominal pain    • Abdominal swelling    • Hoyos's disease    • Bipolar 1 disorder (HCC)    • Brain tumor (benign) (HCC)    • Brain tumor (HCC)     R Frontal Lobe per pt   • Brain tumor (HCC) 2014   • Constipation    • DDD (degenerative disc disease), cervical 05/29/2017   • DDD (degenerative disc disease), cervical    • Diarrhea    • Fibromyalgia    • IBS (irritable bowel syndrome)    • Migraine    • Nausea & vomiting    • PONV (postoperative nausea and vomiting)    • PTSD (post-traumatic stress disorder)    • Rectal bleeding        Current Meds:     Current Outpatient Medications   Medication Sig Dispense Refill   • albuterol (PROVENTIL HFA;VENTOLIN HFA) 108 (90 Base) MCG/ACT inhaler Inhale 2 puffs 2 (Two) Times a Day.     • Azelastine HCl 137 MCG/SPRAY solution 1 spray into each nostril As Needed (Allergies).     • busPIRone (BUSPAR) 15 MG tablet Take 15 mg by mouth 3 (three) times a day        • ciprofloxacin (CIPRO) 500 MG tablet Take 1 tablet by mouth 2 (Two) Times a Day. 19 tablet 0   • diclofenac (VOLTAREN) 1 % gel gel      • divalproex (DEPAKOTE) 500 MG DR tablet Take 1 tablet by mouth 3 (Three) Times a Day. 90 tablet 2   • docusate sodium (COLACE) 100 MG capsule Take 1 capsule by mouth 2 (Two) Times a Day. 20 capsule 0   • docusate sodium (COLACE) 100 MG capsule Take 1 capsule by mouth 2 (Two) Times a Day As Needed for Constipation. 60 capsule 0   • fluticasone (VERAMYST) 27.5 MCG/SPRAY nasal spray 2 sprays into each nostril Daily.     • megestrol (MEGACE) 20 MG tablet Take 1 tablet by mouth Daily. 30 tablet 3   • methylPREDNISolone (MEDROL) 4 MG dose pack Take as directed on package instructions. 21 tablet 0   • metroNIDAZOLE (FLAGYL) 500 MG  tablet Take 1 tablet by mouth 3 (Three) Times a Day. 30 tablet 0   • montelukast (SINGULAIR) 10 MG tablet Take 10 mg by mouth Every Night.     • ondansetron ODT (ZOFRAN-ODT) 4 MG disintegrating tablet Place 2 tablets on the tongue Every 8 (Eight) Hours As Needed for Nausea or Vomiting. 30 tablet 0   • oxyCODONE-acetaminophen (Percocet)  MG per tablet Take 1 tablet by mouth Every 6 (Six) Hours As Needed for Moderate Pain. 20 tablet 0   • oxyCODONE-acetaminophen (PERCOCET) 5-325 MG per tablet Take 1 tablet by mouth Every 8 (Eight) Hours As Needed for Moderate Pain . 12 tablet 0   • pantoprazole (PROTONIX) 40 MG EC tablet Take 40 mg by mouth 2 (Two) Times a Day As Needed.     • phenazopyridine (PYRIDIUM) 100 MG tablet Take 1 tablet by mouth 3 (Three) Times a Day As Needed for Bladder Spasms. 30 tablet 1   • polyethylene glycol (MIRALAX) 17 GM/SCOOP powder Take 17 g by mouth Daily. 225 g 0   • promethazine (PHENERGAN) 25 MG tablet Take 1 tablet by mouth Every 6 (Six) Hours As Needed for Nausea or Vomiting. 21 tablet 5   • promethazine (PHENERGAN) 25 MG tablet Take 1 tablet by mouth Every 6 (Six) Hours As Needed for Nausea or Vomiting. 15 tablet 0   • sertraline (ZOLOFT) 100 MG tablet Take 100 mg by mouth 2 (Two) Times a Day.     • SUMAtriptan (IMITREX) 6 MG/0.5ML injection Inject 6 mg under the skin 1 (One) Time.     • terazosin (HYTRIN) 2 MG capsule Take 1 capsule by mouth Every Night for 14 days. 14 capsule 0   • traMADol (ULTRAM) 50 MG tablet        No current facility-administered medications for this visit.        Allergies:      Allergies   Allergen Reactions   • Ativan [Lorazepam] Hallucinations     confusion   • Sulfa Antibiotics Shortness Of Breath and Swelling   • Sulfa Antibiotics Anaphylaxis   • Reglan [Metoclopramide] Angioedema   • Compazine [Prochlorperazine Edisylate] Hives   • Demerol [Meperidine] Hives   • Droperidol Itching   • Metoclopramide Swelling   • Toradol [Ketorolac Tromethamine] Hives  and Itching   • Toradol [Ketorolac Tromethamine] Hives   • Zofran [Ondansetron Hcl] Rash   • Zosyn [Piperacillin Sod-Tazobactam So] Hives        Past Surgical History:     Past Surgical History:   Procedure Laterality Date   • ANAL SCOPE N/A 2016    Procedure: ANAL SCOPE;  Surgeon: Kael Lopez MD;  Location: [facility];  Service:    • APPENDECTOMY     • COLONOSCOPY N/A 2016    Procedure: COLONOSCOPY  CPTCODE:62189;  Surgeon: Jose Antonio Belle III, MD;  Location: [facility];  Service:    • COLONOSCOPY N/A 2016    Procedure: COLONOSCOPY (10682) CPT;  Surgeon: Jose Antonio Belle III, MD;  Location: [facility];  Service:    • CYSTOSCOPY RETROGRADE PYELOGRAM Bilateral 2017    Procedure: CYSTOSCOPY RETROGRADE PYELOGRAM;  Surgeon: Mu Blunt MD;  Location: [facility];  Service:    • ENDOSCOPY N/A 2016    Procedure: ESOPHAGOGASTRODUODENOSCOPY WITH BIOPSY  CPTCODE:14703;  Surgeon: Jose Antonio Belle III, MD;  Location: [facility];  Service:    • HEMORRHOIDECTOMY N/A 2016    Procedure: HEMORRHOID STAPLING;  Surgeon: Kael Lopez MD;  Location: [facility];  Service:    • HYSTERECTOMY     • KNEE SURGERY     • LAPAROSCOPIC SALPINGOOPHERECTOMY     • PORTACATH PLACEMENT N/A 2017    Procedure: INSERTION OF PORTACATH;  Surgeon: Celso Arredondo MD;  Location: [facility];  Service:    • SHOULDER SURGERY      3 times       Social History:     Social History     Socioeconomic History   • Marital status:    Tobacco Use   • Smoking status: Former     Packs/day: 1.00     Years: 20.00     Pack years: 20.00     Types: Cigarettes     Quit date: 2015     Years since quittin.4   • Smokeless tobacco: Never   Vaping Use   • Vaping Use: Never used   Substance and Sexual Activity   • Alcohol use: No   • Drug use: Yes     Types: Marijuana     Comment: for pain   • Sexual activity: Defer     Birth control/protection: Surgical       Family History:     Family History   Problem  Relation Age of Onset   • Crohn's disease Other    • Hypertension Other    • Diabetes Other    • Irritable bowel syndrome Other    • No Known Problems Father    • No Known Problems Mother        Review of Systems:     Review of Systems   Constitutional: Negative.  Negative for activity change, appetite change, chills, diaphoresis, fatigue and unexpected weight change.   HENT: Negative for congestion, dental problem, drooling, ear discharge, ear pain, facial swelling, hearing loss, mouth sores, nosebleeds, postnasal drip, rhinorrhea, sinus pressure, sneezing, sore throat, tinnitus, trouble swallowing and voice change.    Eyes: Negative.  Negative for photophobia, pain, discharge, redness, itching and visual disturbance.   Respiratory: Negative.  Negative for apnea, cough, choking, chest tightness, shortness of breath, wheezing and stridor.    Cardiovascular: Negative.  Negative for chest pain, palpitations and leg swelling.   Gastrointestinal: Negative.  Negative for abdominal distention, abdominal pain, anal bleeding, blood in stool, constipation, diarrhea, nausea, rectal pain and vomiting.   Endocrine: Negative.  Negative for cold intolerance, heat intolerance, polydipsia, polyphagia and polyuria.   Musculoskeletal: Negative.  Negative for arthralgias, back pain, gait problem, joint swelling, myalgias, neck pain and neck stiffness.   Skin: Negative.  Negative for color change, pallor, rash and wound.   Allergic/Immunologic: Negative.  Negative for environmental allergies, food allergies and immunocompromised state.   Neurological: Negative.  Negative for dizziness, tremors, seizures, syncope, facial asymmetry, speech difficulty, weakness, light-headedness, numbness and headaches.   Hematological: Negative.  Negative for adenopathy. Does not bruise/bleed easily.   Psychiatric/Behavioral: Negative for agitation, behavioral problems, confusion, decreased concentration, dysphoric mood, hallucinations, self-injury,  sleep disturbance and suicidal ideas. The patient is not nervous/anxious and is not hyperactive.    All other systems reviewed and are negative.      Physical Exam:     Physical Exam  Constitutional:       Appearance: She is well-developed.   HENT:      Head: Normocephalic and atraumatic.      Right Ear: External ear normal.      Left Ear: External ear normal.   Eyes:      Conjunctiva/sclera: Conjunctivae normal.      Pupils: Pupils are equal, round, and reactive to light.   Cardiovascular:      Rate and Rhythm: Normal rate and regular rhythm.      Heart sounds: Normal heart sounds.   Pulmonary:      Effort: Pulmonary effort is normal.      Breath sounds: Normal breath sounds.   Abdominal:      General: Bowel sounds are normal. There is no distension.      Palpations: Abdomen is soft. There is no mass.      Tenderness: There is no abdominal tenderness. There is no guarding or rebound.   Genitourinary:     Vagina: No vaginal discharge.   Musculoskeletal:         General: Normal range of motion.   Skin:     General: Skin is warm and dry.   Neurological:      Mental Status: She is alert.      Deep Tendon Reflexes: Reflexes are normal and symmetric.   Psychiatric:         Behavior: Behavior normal.         Thought Content: Thought content normal.         Judgment: Judgment normal.         I have reviewed the following portions of the patient's history: Allergies, current medications, past family history, past medical history, past social history, past surgical history, problem list, and ROS and confirm it is accurate.    Recent Image (CT and/or KUB):      CT Abdomen and Pelvis: No results found for this or any previous visit.       CT Stone Protocol: Results for orders placed during the hospital encounter of 03/16/23    CT Abdomen Pelvis Stone Protocol    Narrative  CT ABDOMEN AND PELVIS, NONCONTRAST, 3/16/2023    HISTORY:  48-year-old female in the ED with lower abdomen pain, hematuria.    TECHNIQUE:  CT imaging of the  abdomen and pelvis ordered as noncontrast kidney stone protocol exam. Radiation dose reduction techniques included automated exposure control. Radiation audit for CT and nuclear cardiology exams in the last 12 months: 0.    COMPARISON:  *  CT abdomen/pelvis, 10/3/2022.    ABDOMEN FINDINGS:  Both kidneys, both ureters and urinary bladder are normal in noncontrast CT appearance. No visible radiopaque renal or urinary tract stone material. No evidence of upper urinary tract obstruction.    No gallbladder distention or bile duct dilatation. Liver, pancreas and spleen are normal in size and appearance without contrast. Normal caliber abdominal aorta.    Small bowel and colon are normal in caliber and appearance, as imaged. The appendix is not directly visualized, but there is no indirect CT evidence of acute appendicitis or other active inflammation in the ileocecal region. No gastric distention, hiatal  hernia or distal esophageal dilatation.    PELVIS FINDINGS:  Hysterectomy. Urinary bladder and rectum are within normal limits. No free pelvic fluid. No inguinal hernia.    Lung base images show no active disease. There is some cylindrical bronchiectasis involving the posterior segment right lower lobe. Fluid opacification of one of the bronchiectatic segments caused a nodular appearance on prior imaging, but this has  resolved today. There is air trapping within this segment.    Mild chronic superior endplate vertebral compression fracture deformity is incidentally noted at L2, unchanged.    Impression  1. No acute abnormality within the abdomen or pelvis.  2. No visible nephrolithiasis or evidence of urinary obstruction.  3. Mild chronic bronchiectasis in the posterior right lower lobe.  4. Mild chronic fracture deformity of the L2 vertebral body.    Signer Name: Sang Painting MD  Signed: 3/16/2023 6:02 PM  Workstation Name: [email or web address]  [facility]       KUB: Results for orders placed  during the hospital encounter of 01/10/23    XR Abdomen KUB    Narrative  EXAM:  XR Abdomen, 1 View    EXAM DATE:  1/10/2023 12:05 PM    CLINICAL HISTORY:  flank pain    TECHNIQUE:  Frontal supine view of the abdomen/pelvis.    COMPARISON:  10/01/2022    FINDINGS:  Gastrointestinal tract:  Unremarkable.  No dilation.  Organs:  No stones identified along the expected course of ureters.  Bones/joints:  Unremarkable.  Soft tissues:  Surgical staples in the lower pelvis.  Vasculature:  Stable phleboliths lower pelvis.    Impression  1.  No acute findings radiographically evident.  2.  No radiographic evidence of renal or ureteral stones.    This report was finalized on 1/10/2023 12:47 PM by Dr. Ankit Naik MD.       Labs (past 3 months):      Admission on 03/24/2023, Discharged on 03/24/2023   Component Date Value Ref Range Status   • Glucose 03/24/2023 90  65 - 99 mg/dL Final   • BUN 03/24/2023 5 (L)  6 - 20 mg/dL Final   • Creatinine 03/24/2023 0.53 (L)  0.57 - 1.00 mg/dL Final   • Sodium 03/24/2023 139  136 - 145 mmol/L Final   • Potassium 03/24/2023 3.8  3.5 - 5.2 mmol/L Final    Slight hemolysis detected by analyzer. Results may be affected.   • Chloride 03/24/2023 109 (H)  98 - 107 mmol/L Final   • CO2 03/24/2023 20.9 (L)  22.0 - 29.0 mmol/L Final   • Calcium 03/24/2023 9.2  8.6 - 10.5 mg/dL Final   • Total Protein 03/24/2023 6.9  6.0 - 8.5 g/dL Final   • Albumin 03/24/2023 4.0  3.5 - 5.2 g/dL Final   • ALT (SGPT) 03/24/2023 55 (H)  1 - 33 U/L Final   • AST (SGOT) 03/24/2023 42 (H)  1 - 32 U/L Final   • Alkaline Phosphatase 03/24/2023 63  39 - 117 U/L Final   • Total Bilirubin 03/24/2023 0.7  0.0 - 1.2 mg/dL Final   • Globulin 03/24/2023 2.9  gm/dL Final   • A/G Ratio 03/24/2023 1.4  g/dL Final   • BUN/Creatinine Ratio 03/24/2023 9.4  7.0 - 25.0 Final   • Anion Gap 03/24/2023 9.1  5.0 - 15.0 mmol/L Final   • eGFR 03/24/2023 114.2  >60.0 mL/min/1.73 Final   • Lipase 03/24/2023 30  13 - 60 U/L Final   • Color,  UA 03/24/2023 Orange (A)  Yellow, Straw Final   • Appearance, UA 03/24/2023 Turbid (A)  Clear Final   • pH, UA 03/24/2023 7.0  5.0 - 8.0 Final   • Specific Gravity, UA 03/24/2023 1.019  1.005 - 1.030 Final   • Glucose, UA 03/24/2023 Negative  Negative Final   • Ketones, UA 03/24/2023 Negative  Negative Final   • Bilirubin, UA 03/24/2023 Small (1+) (A)  Negative Final   • Blood, UA 03/24/2023 Large (3+) (A)  Negative Final   • Protein, UA 03/24/2023 30 mg/dL (1+) (A)  Negative Final   • Leuk Esterase, UA 03/24/2023 Small (1+) (A)  Negative Final   • Nitrite, UA 03/24/2023 Positive (A)  Negative Final   • Urobilinogen, UA 03/24/2023 1.0 E.U./dL  0.2 - 1.0 E.U./dL Final   • WBC 03/24/2023 5.96  3.40 - 10.80 10*3/mm3 Final   • RBC 03/24/2023 4.76  3.77 - 5.28 10*6/mm3 Final   • Hemoglobin 03/24/2023 15.0  12.0 - 15.9 g/dL Final   • Hematocrit 03/24/2023 44.8  34.0 - 46.6 % Final   • MCV 03/24/2023 94.1  79.0 - 97.0 fL Final   • MCH 03/24/2023 31.5  26.6 - 33.0 pg Final   • MCHC 03/24/2023 33.5  31.5 - 35.7 g/dL Final   • RDW 03/24/2023 13.6  12.3 - 15.4 % Final   • RDW-SD 03/24/2023 47.0  37.0 - 54.0 fl Final   • MPV 03/24/2023 10.2  6.0 - 12.0 fL Final   • Platelets 03/24/2023 165  140 - 450 10*3/mm3 Final   • Neutrophil % 03/24/2023 56.7  42.7 - 76.0 % Final   • Lymphocyte % 03/24/2023 34.6  19.6 - 45.3 % Final   • Monocyte % 03/24/2023 6.5  5.0 - 12.0 % Final   • Eosinophil % 03/24/2023 1.8  0.3 - 6.2 % Final   • Basophil % 03/24/2023 0.2  0.0 - 1.5 % Final   • Immature Grans % 03/24/2023 0.2  0.0 - 0.5 % Final   • Neutrophils, Absolute 03/24/2023 3.38  1.70 - 7.00 10*3/mm3 Final   • Lymphocytes, Absolute 03/24/2023 2.06  0.70 - 3.10 10*3/mm3 Final   • Monocytes, Absolute 03/24/2023 0.39  0.10 - 0.90 10*3/mm3 Final   • Eosinophils, Absolute 03/24/2023 0.11  0.00 - 0.40 10*3/mm3 Final   • Basophils, Absolute 03/24/2023 0.01  0.00 - 0.20 10*3/mm3 Final   • Immature Grans, Absolute 03/24/2023 0.01  0.00 - 0.05 10*3/mm3  Final   • nRBC 03/24/2023 0.0  0.0 - 0.2 /100 WBC Final   • Extra Tube 03/24/2023 Hold for add-ons.   Final    Auto resulted.   • Extra Tube 03/24/2023 hold for add-on   Final    Auto resulted   • Extra Tube 03/24/2023 Hold for add-ons.   Final    Auto resulted.   • Extra Tube 03/24/2023 Hold for add-ons.   Final    Auto resulted   • RBC, UA 03/24/2023 Too Numerous to Count (A)  None Seen, 0-2 /HPF Final   • WBC, UA 03/24/2023 6-12 (A)  None Seen, 0-2 /HPF Final   • Bacteria, UA 03/24/2023 1+ (A)  None Seen /HPF Final   • Squamous Epithelial Cells, UA 03/24/2023 13-20 (A)  None Seen, 0-2 /HPF Final   • Hyaline Casts, UA 03/24/2023 3-6  None Seen /LPF Final   • Methodology 03/24/2023 Automated Microscopy   Final   • Urine Culture 03/24/2023 No growth   Final   Admission on 03/16/2023, Discharged on 03/16/2023   Component Date Value Ref Range Status   • Glucose 03/16/2023 89  65 - 99 mg/dL Final   • BUN 03/16/2023 6  6 - 20 mg/dL Final   • Creatinine 03/16/2023 0.71  0.57 - 1.00 mg/dL Final   • Sodium 03/16/2023 140  136 - 145 mmol/L Final   • Potassium 03/16/2023 3.8  3.5 - 5.2 mmol/L Final   • Chloride 03/16/2023 107  98 - 107 mmol/L Final   • CO2 03/16/2023 21.9 (L)  22.0 - 29.0 mmol/L Final   • Calcium 03/16/2023 9.6  8.6 - 10.5 mg/dL Final   • Total Protein 03/16/2023 7.6  6.0 - 8.5 g/dL Final   • Albumin 03/16/2023 4.4  3.5 - 5.2 g/dL Final   • ALT (SGPT) 03/16/2023 52 (H)  1 - 33 U/L Final   • AST (SGOT) 03/16/2023 44 (H)  1 - 32 U/L Final   • Alkaline Phosphatase 03/16/2023 69  39 - 117 U/L Final   • Total Bilirubin 03/16/2023 0.9  0.0 - 1.2 mg/dL Final   • Globulin 03/16/2023 3.2  gm/dL Final   • A/G Ratio 03/16/2023 1.4  g/dL Final   • BUN/Creatinine Ratio 03/16/2023 8.5  7.0 - 25.0 Final   • Anion Gap 03/16/2023 11.1  5.0 - 15.0 mmol/L Final   • eGFR 03/16/2023 105.0  >60.0 mL/min/1.73 Final   • C-Reactive Protein 03/16/2023 <0.30  0.00 - 0.50 mg/dL Final   • Color, UA 03/16/2023 Orange (A)  Yellow, Straw  Final   • Appearance, UA 03/16/2023 Cloudy (A)  Clear Final   • pH, UA 03/16/2023 7.0  5.0 - 8.0 Final   • Specific Gravity, UA 03/16/2023 1.011  1.005 - 1.030 Final   • Glucose, UA 03/16/2023 Negative  Negative Final   • Ketones, UA 03/16/2023 Negative  Negative Final   • Bilirubin, UA 03/16/2023 Negative  Negative Final   • Blood, UA 03/16/2023 Large (3+) (A)  Negative Final   • Protein, UA 03/16/2023 Trace (A)  Negative Final   • Leuk Esterase, UA 03/16/2023 Trace (A)  Negative Final   • Nitrite, UA 03/16/2023 Negative  Negative Final   • Urobilinogen, UA 03/16/2023 1.0 E.U./dL  0.2 - 1.0 E.U./dL Final   • Extra Tube 03/16/2023 Hold for add-ons.   Final    Auto resulted.   • Extra Tube 03/16/2023 hold for add-on   Final    Auto resulted   • Extra Tube 03/16/2023 Hold for add-ons.   Final    Auto resulted.   • Extra Tube 03/16/2023 Hold for add-ons.   Final    Auto resulted   • WBC 03/16/2023 7.11  3.40 - 10.80 10*3/mm3 Final   • RBC 03/16/2023 5.24  3.77 - 5.28 10*6/mm3 Final   • Hemoglobin 03/16/2023 16.1 (H)  12.0 - 15.9 g/dL Final   • Hematocrit 03/16/2023 50.0 (H)  34.0 - 46.6 % Final   • MCV 03/16/2023 95.4  79.0 - 97.0 fL Final   • MCH 03/16/2023 30.7  26.6 - 33.0 pg Final   • MCHC 03/16/2023 32.2  31.5 - 35.7 g/dL Final   • RDW 03/16/2023 13.6  12.3 - 15.4 % Final   • RDW-SD 03/16/2023 48.4  37.0 - 54.0 fl Final   • MPV 03/16/2023 10.1  6.0 - 12.0 fL Final   • Platelets 03/16/2023 184  140 - 450 10*3/mm3 Final   • Neutrophil % 03/16/2023 62.9  42.7 - 76.0 % Final   • Lymphocyte % 03/16/2023 27.6  19.6 - 45.3 % Final   • Monocyte % 03/16/2023 7.2  5.0 - 12.0 % Final   • Eosinophil % 03/16/2023 1.7  0.3 - 6.2 % Final   • Basophil % 03/16/2023 0.3  0.0 - 1.5 % Final   • Immature Grans % 03/16/2023 0.3  0.0 - 0.5 % Final   • Neutrophils, Absolute 03/16/2023 4.48  1.70 - 7.00 10*3/mm3 Final   • Lymphocytes, Absolute 03/16/2023 1.96  0.70 - 3.10 10*3/mm3 Final   • Monocytes, Absolute 03/16/2023 0.51  0.10 - 0.90  10*3/mm3 Final   • Eosinophils, Absolute 03/16/2023 0.12  0.00 - 0.40 10*3/mm3 Final   • Basophils, Absolute 03/16/2023 0.02  0.00 - 0.20 10*3/mm3 Final   • Immature Grans, Absolute 03/16/2023 0.02  0.00 - 0.05 10*3/mm3 Final   • nRBC 03/16/2023 0.0  0.0 - 0.2 /100 WBC Final   • RBC, UA 03/16/2023 Too Numerous to Count (A)  None Seen, 0-2 /HPF Final   • WBC, UA 03/16/2023 3-5 (A)  None Seen, 0-2 /HPF Final    Urine culture not indicated.   • Bacteria, UA 03/16/2023 None Seen  None Seen /HPF Final   • Squamous Epithelial Cells, UA 03/16/2023 0-2  None Seen, 0-2 /HPF Final   • Hyaline Casts, UA 03/16/2023 None Seen  None Seen /LPF Final   • Methodology 03/16/2023 Automated Microscopy   Final   Admission on 02/25/2023, Discharged on 02/25/2023   Component Date Value Ref Range Status   • Glucose 02/25/2023 91  65 - 99 mg/dL Final   • BUN 02/25/2023 7  6 - 20 mg/dL Final   • Creatinine 02/25/2023 0.67  0.57 - 1.00 mg/dL Final   • Sodium 02/25/2023 143  136 - 145 mmol/L Final   • Potassium 02/25/2023 4.3  3.5 - 5.2 mmol/L Final   • Chloride 02/25/2023 107  98 - 107 mmol/L Final   • CO2 02/25/2023 28.1  22.0 - 29.0 mmol/L Final   • Calcium 02/25/2023 9.6  8.6 - 10.5 mg/dL Final   • Total Protein 02/25/2023 7.0  6.0 - 8.5 g/dL Final   • Albumin 02/25/2023 4.3  3.5 - 5.2 g/dL Final   • ALT (SGPT) 02/25/2023 54 (H)  1 - 33 U/L Final   • AST (SGOT) 02/25/2023 44 (H)  1 - 32 U/L Final   • Alkaline Phosphatase 02/25/2023 72  39 - 117 U/L Final   • Total Bilirubin 02/25/2023 0.5  0.0 - 1.2 mg/dL Final   • Globulin 02/25/2023 2.7  gm/dL Final   • A/G Ratio 02/25/2023 1.6  g/dL Final   • BUN/Creatinine Ratio 02/25/2023 10.4  7.0 - 25.0 Final   • Anion Gap 02/25/2023 7.9  5.0 - 15.0 mmol/L Final   • eGFR 02/25/2023 108.0  >60.0 mL/min/1.73 Final   • Color, UA 02/25/2023 Red (A)  Yellow, Straw Final    Dipstick results may be inaccurate due to color interference.       • Appearance, UA 02/25/2023 Cloudy (A)  Clear Final   • pH, UA  02/25/2023 >=9.0 (H)  5.0 - 8.0 Final   • Specific Gravity, UA 02/25/2023 1.010  1.005 - 1.030 Final   • Glucose, UA 02/25/2023 Negative  Negative Final   • Ketones, UA 02/25/2023 Negative  Negative Final   • Bilirubin, UA 02/25/2023 Negative  Negative Final   • Blood, UA 02/25/2023 Large (3+) (A)  Negative Final   • Protein, UA 02/25/2023 Negative  Negative Final   • Leuk Esterase, UA 02/25/2023 Small (1+) (A)  Negative Final   • Nitrite, UA 02/25/2023 Negative  Negative Final   • Urobilinogen, UA 02/25/2023 0.2 E.U./dL  0.2 - 1.0 E.U./dL Final   • C-Reactive Protein 02/25/2023 <0.30  0.00 - 0.50 mg/dL Final   • WBC 02/25/2023 4.97  3.40 - 10.80 10*3/mm3 Final   • RBC 02/25/2023 4.78  3.77 - 5.28 10*6/mm3 Final   • Hemoglobin 02/25/2023 14.5  12.0 - 15.9 g/dL Final   • Hematocrit 02/25/2023 44.1  34.0 - 46.6 % Final   • MCV 02/25/2023 92.3  79.0 - 97.0 fL Final   • MCH 02/25/2023 30.3  26.6 - 33.0 pg Final   • MCHC 02/25/2023 32.9  31.5 - 35.7 g/dL Final   • RDW 02/25/2023 14.0  12.3 - 15.4 % Final   • RDW-SD 02/25/2023 47.3  37.0 - 54.0 fl Final   • MPV 02/25/2023 10.7  6.0 - 12.0 fL Final   • Platelets 02/25/2023 154  140 - 450 10*3/mm3 Final   • Neutrophil % 02/25/2023 47.7  42.7 - 76.0 % Final   • Lymphocyte % 02/25/2023 41.9  19.6 - 45.3 % Final   • Monocyte % 02/25/2023 6.2  5.0 - 12.0 % Final   • Eosinophil % 02/25/2023 3.6  0.3 - 6.2 % Final   • Basophil % 02/25/2023 0.4  0.0 - 1.5 % Final   • Immature Grans % 02/25/2023 0.2  0.0 - 0.5 % Final   • Neutrophils, Absolute 02/25/2023 2.37  1.70 - 7.00 10*3/mm3 Final   • Lymphocytes, Absolute 02/25/2023 2.08  0.70 - 3.10 10*3/mm3 Final   • Monocytes, Absolute 02/25/2023 0.31  0.10 - 0.90 10*3/mm3 Final   • Eosinophils, Absolute 02/25/2023 0.18  0.00 - 0.40 10*3/mm3 Final   • Basophils, Absolute 02/25/2023 0.02  0.00 - 0.20 10*3/mm3 Final   • Immature Grans, Absolute 02/25/2023 0.01  0.00 - 0.05 10*3/mm3 Final   • nRBC 02/25/2023 0.0  0.0 - 0.2 /100 WBC Final   •  RBC, UA 02/25/2023 Too Numerous to Count (A)  None Seen, 0-2 /HPF Final   • WBC, UA 02/25/2023 3-5 (A)  None Seen, 0-2 /HPF Final    Urine culture not indicated.   • Bacteria, UA 02/25/2023 None Seen  None Seen /HPF Final   • Squamous Epithelial Cells, UA 02/25/2023 0-2  None Seen, 0-2 /HPF Final   • Hyaline Casts, UA 02/25/2023 None Seen  None Seen /LPF Final   • Methodology 02/25/2023 Automated Microscopy   Final   Admission on 02/21/2023, Discharged on 02/21/2023   Component Date Value Ref Range Status   • Glucose 02/21/2023 90  65 - 99 mg/dL Final   • BUN 02/21/2023 4 (L)  6 - 20 mg/dL Final   • Creatinine 02/21/2023 0.67  0.57 - 1.00 mg/dL Final   • Sodium 02/21/2023 141  136 - 145 mmol/L Final   • Potassium 02/21/2023 4.1  3.5 - 5.2 mmol/L Final    Slight hemolysis detected by analyzer. Results may be affected.   • Chloride 02/21/2023 108 (H)  98 - 107 mmol/L Final   • CO2 02/21/2023 23.3  22.0 - 29.0 mmol/L Final   • Calcium 02/21/2023 9.2  8.6 - 10.5 mg/dL Final   • Total Protein 02/21/2023 6.6  6.0 - 8.5 g/dL Final   • Albumin 02/21/2023 4.0  3.5 - 5.2 g/dL Final   • ALT (SGPT) 02/21/2023 56 (H)  1 - 33 U/L Final   • AST (SGOT) 02/21/2023 46 (H)  1 - 32 U/L Final   • Alkaline Phosphatase 02/21/2023 71  39 - 117 U/L Final   • Total Bilirubin 02/21/2023 0.5  0.0 - 1.2 mg/dL Final   • Globulin 02/21/2023 2.6  gm/dL Final   • A/G Ratio 02/21/2023 1.5  g/dL Final   • BUN/Creatinine Ratio 02/21/2023 6.0 (L)  7.0 - 25.0 Final   • Anion Gap 02/21/2023 9.7  5.0 - 15.0 mmol/L Final   • eGFR 02/21/2023 108.0  >60.0 mL/min/1.73 Final   • Lipase 02/21/2023 47  13 - 60 U/L Final   • C-Reactive Protein 02/21/2023 <0.30  0.00 - 0.50 mg/dL Final   • Color, UA 02/21/2023 Yellow  Yellow, Straw Final   • Appearance, UA 02/21/2023 Clear  Clear Final   • pH, UA 02/21/2023 7.0  5.0 - 8.0 Final   • Specific Gravity, UA 02/21/2023 1.006  1.005 - 1.030 Final   • Glucose, UA 02/21/2023 Negative  Negative Final   • Ketones, UA  02/21/2023 Negative  Negative Final   • Bilirubin, UA 02/21/2023 Negative  Negative Final   • Blood, UA 02/21/2023 Negative  Negative Final   • Protein, UA 02/21/2023 Negative  Negative Final   • Leuk Esterase, UA 02/21/2023 Negative  Negative Final   • Nitrite, UA 02/21/2023 Negative  Negative Final   • Urobilinogen, UA 02/21/2023 0.2 E.U./dL  0.2 - 1.0 E.U./dL Final   • Extra Tube 02/21/2023 Hold for add-ons.   Final    Auto resulted.   • Extra Tube 02/21/2023 hold for add-on   Final    Auto resulted   • Extra Tube 02/21/2023 Hold for add-ons.   Final    Auto resulted.   • Extra Tube 02/21/2023 Hold for add-ons.   Final    Auto resulted   • WBC 02/21/2023 5.14  3.40 - 10.80 10*3/mm3 Final   • RBC 02/21/2023 4.93  3.77 - 5.28 10*6/mm3 Final   • Hemoglobin 02/21/2023 15.3  12.0 - 15.9 g/dL Final   • Hematocrit 02/21/2023 46.6  34.0 - 46.6 % Final   • MCV 02/21/2023 94.5  79.0 - 97.0 fL Final   • MCH 02/21/2023 31.0  26.6 - 33.0 pg Final   • MCHC 02/21/2023 32.8  31.5 - 35.7 g/dL Final   • RDW 02/21/2023 13.8  12.3 - 15.4 % Final   • RDW-SD 02/21/2023 47.8  37.0 - 54.0 fl Final   • MPV 02/21/2023 10.2  6.0 - 12.0 fL Final   • Platelets 02/21/2023 143  140 - 450 10*3/mm3 Final   • Neutrophil % 02/21/2023 60.1  42.7 - 76.0 % Final   • Lymphocyte % 02/21/2023 31.7  19.6 - 45.3 % Final   • Monocyte % 02/21/2023 4.9 (L)  5.0 - 12.0 % Final   • Eosinophil % 02/21/2023 2.7  0.3 - 6.2 % Final   • Basophil % 02/21/2023 0.4  0.0 - 1.5 % Final   • Immature Grans % 02/21/2023 0.2  0.0 - 0.5 % Final   • Neutrophils, Absolute 02/21/2023 3.09  1.70 - 7.00 10*3/mm3 Final   • Lymphocytes, Absolute 02/21/2023 1.63  0.70 - 3.10 10*3/mm3 Final   • Monocytes, Absolute 02/21/2023 0.25  0.10 - 0.90 10*3/mm3 Final   • Eosinophils, Absolute 02/21/2023 0.14  0.00 - 0.40 10*3/mm3 Final   • Basophils, Absolute 02/21/2023 0.02  0.00 - 0.20 10*3/mm3 Final   • Immature Grans, Absolute 02/21/2023 0.01  0.00 - 0.05 10*3/mm3 Final   • nRBC 02/21/2023  0.0  0.0 - 0.2 /100 WBC Final   Admission on 02/09/2023, Discharged on 02/09/2023   Component Date Value Ref Range Status   • Color, UA 02/09/2023 Yellow  Yellow, Straw Final   • Appearance, UA 02/09/2023 Clear  Clear Final   • pH, UA 02/09/2023 6.5  5.0 - 8.0 Final   • Specific Gravity, UA 02/09/2023 1.008  1.005 - 1.030 Final   • Glucose, UA 02/09/2023 Negative  Negative Final   • Ketones, UA 02/09/2023 Negative  Negative Final   • Bilirubin, UA 02/09/2023 Negative  Negative Final   • Blood, UA 02/09/2023 Negative  Negative Final   • Protein, UA 02/09/2023 Negative  Negative Final   • Leuk Esterase, UA 02/09/2023 Negative  Negative Final   • Nitrite, UA 02/09/2023 Negative  Negative Final   • Urobilinogen, UA 02/09/2023 0.2 E.U./dL  0.2 - 1.0 E.U./dL Final   • Extra Tube 02/09/2023 Hold for add-ons.   Final    Auto resulted.   • Extra Tube 02/09/2023 hold for add-on   Final    Auto resulted   • Extra Tube 02/09/2023 Hold for add-ons.   Final    Auto resulted.   • Glucose 02/09/2023 98  65 - 99 mg/dL Final   • BUN 02/09/2023 4 (L)  6 - 20 mg/dL Final   • Creatinine 02/09/2023 0.53 (L)  0.57 - 1.00 mg/dL Final   • Sodium 02/09/2023 141  136 - 145 mmol/L Final   • Potassium 02/09/2023 4.0  3.5 - 5.2 mmol/L Final   • Chloride 02/09/2023 107  98 - 107 mmol/L Final   • CO2 02/09/2023 26.8  22.0 - 29.0 mmol/L Final   • Calcium 02/09/2023 8.5 (L)  8.6 - 10.5 mg/dL Final   • Total Protein 02/09/2023 6.1  6.0 - 8.5 g/dL Final   • Albumin 02/09/2023 3.7  3.5 - 5.2 g/dL Final   • ALT (SGPT) 02/09/2023 44 (H)  1 - 33 U/L Final   • AST (SGOT) 02/09/2023 37 (H)  1 - 32 U/L Final   • Alkaline Phosphatase 02/09/2023 66  39 - 117 U/L Final   • Total Bilirubin 02/09/2023 0.4  0.0 - 1.2 mg/dL Final   • Globulin 02/09/2023 2.4  gm/dL Final   • A/G Ratio 02/09/2023 1.5  g/dL Final   • BUN/Creatinine Ratio 02/09/2023 7.5  7.0 - 25.0 Final   • Anion Gap 02/09/2023 7.2  5.0 - 15.0 mmol/L Final   • eGFR 02/09/2023 114.2  >60.0 mL/min/1.73  Final   • WBC 02/09/2023 3.84  3.40 - 10.80 10*3/mm3 Final   • RBC 02/09/2023 4.29  3.77 - 5.28 10*6/mm3 Final   • Hemoglobin 02/09/2023 13.2  12.0 - 15.9 g/dL Final   • Hematocrit 02/09/2023 39.5  34.0 - 46.6 % Final   • MCV 02/09/2023 92.1  79.0 - 97.0 fL Final   • MCH 02/09/2023 30.8  26.6 - 33.0 pg Final   • MCHC 02/09/2023 33.4  31.5 - 35.7 g/dL Final   • RDW 02/09/2023 13.6  12.3 - 15.4 % Final   • RDW-SD 02/09/2023 45.9  37.0 - 54.0 fl Final   • MPV 02/09/2023 10.3  6.0 - 12.0 fL Final   • Platelets 02/09/2023 160  140 - 450 10*3/mm3 Final   • Neutrophil % 02/09/2023 50.5  42.7 - 76.0 % Final   • Lymphocyte % 02/09/2023 38.5  19.6 - 45.3 % Final   • Monocyte % 02/09/2023 6.8  5.0 - 12.0 % Final   • Eosinophil % 02/09/2023 3.6  0.3 - 6.2 % Final   • Basophil % 02/09/2023 0.3  0.0 - 1.5 % Final   • Immature Grans % 02/09/2023 0.3  0.0 - 0.5 % Final   • Neutrophils, Absolute 02/09/2023 1.94  1.70 - 7.00 10*3/mm3 Final   • Lymphocytes, Absolute 02/09/2023 1.48  0.70 - 3.10 10*3/mm3 Final   • Monocytes, Absolute 02/09/2023 0.26  0.10 - 0.90 10*3/mm3 Final   • Eosinophils, Absolute 02/09/2023 0.14  0.00 - 0.40 10*3/mm3 Final   • Basophils, Absolute 02/09/2023 0.01  0.00 - 0.20 10*3/mm3 Final   • Immature Grans, Absolute 02/09/2023 0.01  0.00 - 0.05 10*3/mm3 Final   • nRBC 02/09/2023 0.0  0.0 - 0.2 /100 WBC Final   Admission on 02/06/2023, Discharged on 02/06/2023   Component Date Value Ref Range Status   • Glucose 02/06/2023 91  65 - 99 mg/dL Final   • BUN 02/06/2023 5 (L)  6 - 20 mg/dL Final   • Creatinine 02/06/2023 0.57  0.57 - 1.00 mg/dL Final   • Sodium 02/06/2023 140  136 - 145 mmol/L Final   • Potassium 02/06/2023 4.0  3.5 - 5.2 mmol/L Final    Slight hemolysis detected by analyzer. Results may be affected.   • Chloride 02/06/2023 109 (H)  98 - 107 mmol/L Final   • CO2 02/06/2023 23.1  22.0 - 29.0 mmol/L Final   • Calcium 02/06/2023 8.9  8.6 - 10.5 mg/dL Final   • Total Protein 02/06/2023 6.6  6.0 - 8.5 g/dL  Final   • Albumin 02/06/2023 4.0  3.5 - 5.2 g/dL Final   • ALT (SGPT) 02/06/2023 42 (H)  1 - 33 U/L Final   • AST (SGOT) 02/06/2023 35 (H)  1 - 32 U/L Final   • Alkaline Phosphatase 02/06/2023 66  39 - 117 U/L Final   • Total Bilirubin 02/06/2023 0.5  0.0 - 1.2 mg/dL Final   • Globulin 02/06/2023 2.6  gm/dL Final   • A/G Ratio 02/06/2023 1.5  g/dL Final   • BUN/Creatinine Ratio 02/06/2023 8.8  7.0 - 25.0 Final   • Anion Gap 02/06/2023 7.9  5.0 - 15.0 mmol/L Final   • eGFR 02/06/2023 112.3  >60.0 mL/min/1.73 Final   • WBC 02/06/2023 5.09  3.40 - 10.80 10*3/mm3 Final   • RBC 02/06/2023 4.79  3.77 - 5.28 10*6/mm3 Final   • Hemoglobin 02/06/2023 14.5  12.0 - 15.9 g/dL Final   • Hematocrit 02/06/2023 45.7  34.0 - 46.6 % Final   • MCV 02/06/2023 95.4  79.0 - 97.0 fL Final   • MCH 02/06/2023 30.3  26.6 - 33.0 pg Final   • MCHC 02/06/2023 31.7  31.5 - 35.7 g/dL Final   • RDW 02/06/2023 13.6  12.3 - 15.4 % Final   • RDW-SD 02/06/2023 48.4  37.0 - 54.0 fl Final   • MPV 02/06/2023 10.2  6.0 - 12.0 fL Final   • Platelets 02/06/2023 158  140 - 450 10*3/mm3 Final   • Neutrophil % 02/06/2023 54.2  42.7 - 76.0 % Final   • Lymphocyte % 02/06/2023 36.9  19.6 - 45.3 % Final   • Monocyte % 02/06/2023 5.7  5.0 - 12.0 % Final   • Eosinophil % 02/06/2023 2.6  0.3 - 6.2 % Final   • Basophil % 02/06/2023 0.4  0.0 - 1.5 % Final   • Immature Grans % 02/06/2023 0.2  0.0 - 0.5 % Final   • Neutrophils, Absolute 02/06/2023 2.76  1.70 - 7.00 10*3/mm3 Final   • Lymphocytes, Absolute 02/06/2023 1.88  0.70 - 3.10 10*3/mm3 Final   • Monocytes, Absolute 02/06/2023 0.29  0.10 - 0.90 10*3/mm3 Final   • Eosinophils, Absolute 02/06/2023 0.13  0.00 - 0.40 10*3/mm3 Final   • Basophils, Absolute 02/06/2023 0.02  0.00 - 0.20 10*3/mm3 Final   • Immature Grans, Absolute 02/06/2023 0.01  0.00 - 0.05 10*3/mm3 Final   • nRBC 02/06/2023 0.0  0.0 - 0.2 /100 WBC Final   • Extra Tube 02/06/2023 Hold for add-ons.   Final    Auto resulted.   • Extra Tube 02/06/2023 hold  for add-on   Final    Auto resulted   Office Visit on 01/24/2023   Component Date Value Ref Range Status   • Color 01/24/2023 Yellow  Yellow, Straw, Dark Yellow, Fe Final   • Clarity, UA 01/24/2023 Clear  Clear Final   • Specific Gravity  01/24/2023 1.005  1.005 - 1.030 Final   • pH, Urine 01/24/2023 6.5  5.0 - 8.0 Final   • Leukocytes 01/24/2023 Negative  Negative Final   • Nitrite, UA 01/24/2023 Negative  Negative Final   • Protein, POC 01/24/2023 Negative  Negative mg/dL Final   • Glucose, UA 01/24/2023 Negative  Negative mg/dL Final   • Ketones, UA 01/24/2023 Negative  Negative Final   • Urobilinogen, UA 01/24/2023 Normal  Normal, 0.2 E.U./dL Final   • Bilirubin 01/24/2023 Negative  Negative Final   • Blood, UA 01/24/2023 3+ (A)  Negative Final   • Lot Number 01/24/2023 [id number]   Final   • Expiration Date 01/24/2023 [id number]   Final   Admission on 01/14/2023, Discharged on 01/14/2023   Component Date Value Ref Range Status   • Glucose 01/14/2023 96  65 - 99 mg/dL Final   • BUN 01/14/2023 6  6 - 20 mg/dL Final   • Creatinine 01/14/2023 0.54 (L)  0.57 - 1.00 mg/dL Final   • Sodium 01/14/2023 137  136 - 145 mmol/L Final   • Potassium 01/14/2023 4.2  3.5 - 5.2 mmol/L Final   • Chloride 01/14/2023 106  98 - 107 mmol/L Final   • CO2 01/14/2023 23.5  22.0 - 29.0 mmol/L Final   • Calcium 01/14/2023 9.2  8.6 - 10.5 mg/dL Final   • Total Protein 01/14/2023 7.0  6.0 - 8.5 g/dL Final   • Albumin 01/14/2023 4.3  3.5 - 5.2 g/dL Final   • ALT (SGPT) 01/14/2023 29  1 - 33 U/L Final   • AST (SGOT) 01/14/2023 24  1 - 32 U/L Final   • Alkaline Phosphatase 01/14/2023 80  39 - 117 U/L Final   • Total Bilirubin 01/14/2023 0.6  0.0 - 1.2 mg/dL Final   • Globulin 01/14/2023 2.7  gm/dL Final   • A/G Ratio 01/14/2023 1.6  g/dL Final   • BUN/Creatinine Ratio 01/14/2023 11.1  7.0 - 25.0 Final   • Anion Gap 01/14/2023 7.5  5.0 - 15.0 mmol/L Final   • eGFR 01/14/2023 114.4  >60.0 mL/min/1.73 Final    National Kidney Foundation and  American Society of Nephrology (ASN) Task Force recommended calculation based on the Chronic Kidney Disease Epidemiology Collaboration (CKD-EPI) equation refit without adjustment for race.   • Lipase 01/14/2023 51  13 - 60 U/L Final   • Color, UA 01/14/2023 Red (A)  Yellow, Straw Final    Dipstick results may be inaccurate due to color interference.       • Appearance, UA 01/14/2023 Cloudy (A)  Clear Final   • pH, UA 01/14/2023 6.5  5.0 - 8.0 Final   • Specific Gravity, UA 01/14/2023 1.013  1.005 - 1.030 Final   • Glucose, UA 01/14/2023 Negative  Negative Final   • Ketones, UA 01/14/2023 Negative  Negative Final   • Bilirubin, UA 01/14/2023 Negative  Negative Final   • Blood, UA 01/14/2023 Large (3+) (A)  Negative Final   • Protein, UA 01/14/2023 30 mg/dL (1+) (A)  Negative Final   • Leuk Esterase, UA 01/14/2023 Trace (A)  Negative Final   • Nitrite, UA 01/14/2023 Negative  Negative Final   • Urobilinogen, UA 01/14/2023 0.2 E.U./dL  0.2 - 1.0 E.U./dL Final   • C-Reactive Protein 01/14/2023 <0.30  0.00 - 0.50 mg/dL Final   • WBC 01/14/2023 5.70  3.40 - 10.80 10*3/mm3 Final   • RBC 01/14/2023 4.95  3.77 - 5.28 10*6/mm3 Final   • Hemoglobin 01/14/2023 15.2  12.0 - 15.9 g/dL Final   • Hematocrit 01/14/2023 45.1  34.0 - 46.6 % Final   • MCV 01/14/2023 91.1  79.0 - 97.0 fL Final   • MCH 01/14/2023 30.7  26.6 - 33.0 pg Final   • MCHC 01/14/2023 33.7  31.5 - 35.7 g/dL Final   • RDW 01/14/2023 13.1  12.3 - 15.4 % Final   • RDW-SD 01/14/2023 43.8  37.0 - 54.0 fl Final   • MPV 01/14/2023 10.3  6.0 - 12.0 fL Final   • Platelets 01/14/2023 204  140 - 450 10*3/mm3 Final   • Neutrophil % 01/14/2023 58.1  42.7 - 76.0 % Final   • Lymphocyte % 01/14/2023 33.5  19.6 - 45.3 % Final   • Monocyte % 01/14/2023 6.7  5.0 - 12.0 % Final   • Eosinophil % 01/14/2023 1.1  0.3 - 6.2 % Final   • Basophil % 01/14/2023 0.4  0.0 - 1.5 % Final   • Immature Grans % 01/14/2023 0.2  0.0 - 0.5 % Final   • Neutrophils, Absolute 01/14/2023 3.32  1.70 -  7.00 10*3/mm3 Final   • Lymphocytes, Absolute 01/14/2023 1.91  0.70 - 3.10 10*3/mm3 Final   • Monocytes, Absolute 01/14/2023 0.38  0.10 - 0.90 10*3/mm3 Final   • Eosinophils, Absolute 01/14/2023 0.06  0.00 - 0.40 10*3/mm3 Final   • Basophils, Absolute 01/14/2023 0.02  0.00 - 0.20 10*3/mm3 Final   • Immature Grans, Absolute 01/14/2023 0.01  0.00 - 0.05 10*3/mm3 Final   • nRBC 01/14/2023 0.0  0.0 - 0.2 /100 WBC Final   • RBC, UA 01/14/2023 Too Numerous to Count (A)  None Seen, 0-2 /HPF Final   • WBC, UA 01/14/2023 21-30 (A)  None Seen, 0-2 /HPF Final   • Bacteria, UA 01/14/2023 2+ (A)  None Seen /HPF Final   • Squamous Epithelial Cells, UA 01/14/2023 3-6 (A)  None Seen, 0-2 /HPF Final   • Yeast, UA 01/14/2023 Small/1+ Yeast  None Seen /HPF Final   • Hyaline Casts, UA 01/14/2023 0-2  None Seen /LPF Final   • Methodology 01/14/2023 Automated Microscopy   Final   • Urine Culture 01/14/2023 No growth   Final   Admission on 01/10/2023, Discharged on 01/10/2023   Component Date Value Ref Range Status   • Glucose 01/10/2023 117 (H)  65 - 99 mg/dL Final   • BUN 01/10/2023 16  6 - 20 mg/dL Final   • Creatinine 01/10/2023 0.60  0.57 - 1.00 mg/dL Final   • Sodium 01/10/2023 136  136 - 145 mmol/L Final   • Potassium 01/10/2023 3.9  3.5 - 5.2 mmol/L Final    Slight hemolysis detected by analyzer. Results may be affected.   • Chloride 01/10/2023 105  98 - 107 mmol/L Final   • CO2 01/10/2023 17.3 (L)  22.0 - 29.0 mmol/L Final   • Calcium 01/10/2023 9.2  8.6 - 10.5 mg/dL Final   • Total Protein 01/10/2023 7.1  6.0 - 8.5 g/dL Final   • Albumin 01/10/2023 3.8  3.5 - 5.2 g/dL Final   • ALT (SGPT) 01/10/2023 30  1 - 33 U/L Final   • AST (SGOT) 01/10/2023 28  1 - 32 U/L Final   • Alkaline Phosphatase 01/10/2023 66  39 - 117 U/L Final   • Total Bilirubin 01/10/2023 0.6  0.0 - 1.2 mg/dL Final   • Globulin 01/10/2023 3.3  gm/dL Final   • A/G Ratio 01/10/2023 1.2  g/dL Final   • BUN/Creatinine Ratio 01/10/2023 26.7 (H)  7.0 - 25.0 Final   •  Anion Gap 01/10/2023 13.7  5.0 - 15.0 mmol/L Final   • eGFR 01/10/2023 111.6  >60.0 mL/min/1.73 Final    National Kidney Foundation and American Society of Nephrology (ASN) Task Force recommended calculation based on the Chronic Kidney Disease Epidemiology Collaboration (CKD-EPI) equation refit without adjustment for race.   • Color, UA 01/10/2023 Red (A)  Yellow, Straw Final   • Appearance, UA 01/10/2023 Cloudy (A)  Clear Final   • pH, UA 01/10/2023 6.5  5.0 - 8.0 Final   • Specific Gravity, UA 01/10/2023 1.022  1.005 - 1.030 Final   • Glucose, UA 01/10/2023 Negative  Negative Final   • Ketones, UA 01/10/2023 Negative  Negative Final   • Bilirubin, UA 01/10/2023 Negative  Negative Final   • Blood, UA 01/10/2023 Large (3+) (A)  Negative Final   • Protein, UA 01/10/2023 30 mg/dL (1+) (A)  Negative Final   • Leuk Esterase, UA 01/10/2023 Small (1+) (A)  Negative Final   • Nitrite, UA 01/10/2023 Negative  Negative Final   • Urobilinogen, UA 01/10/2023 1.0 E.U./dL  0.2 - 1.0 E.U./dL Final   • C-Reactive Protein 01/10/2023 <0.30  0.00 - 0.50 mg/dL Final   • WBC 01/10/2023 5.17  3.40 - 10.80 10*3/mm3 Final   • RBC 01/10/2023 5.15  3.77 - 5.28 10*6/mm3 Final   • Hemoglobin 01/10/2023 15.5  12.0 - 15.9 g/dL Final   • Hematocrit 01/10/2023 46.9 (H)  34.0 - 46.6 % Final   • MCV 01/10/2023 91.1  79.0 - 97.0 fL Final   • MCH 01/10/2023 30.1  26.6 - 33.0 pg Final   • MCHC 01/10/2023 33.0  31.5 - 35.7 g/dL Final   • RDW 01/10/2023 13.4  12.3 - 15.4 % Final   • RDW-SD 01/10/2023 45.4  37.0 - 54.0 fl Final   • MPV 01/10/2023 10.4  6.0 - 12.0 fL Final   • Platelets 01/10/2023 193  140 - 450 10*3/mm3 Final   • Neutrophil % 01/10/2023 57.1  42.7 - 76.0 % Final   • Lymphocyte % 01/10/2023 34.6  19.6 - 45.3 % Final   • Monocyte % 01/10/2023 6.4  5.0 - 12.0 % Final   • Eosinophil % 01/10/2023 1.5  0.3 - 6.2 % Final   • Basophil % 01/10/2023 0.2  0.0 - 1.5 % Final   • Immature Grans % 01/10/2023 0.2  0.0 - 0.5 % Final   • Neutrophils,  Absolute 01/10/2023 2.95  1.70 - 7.00 10*3/mm3 Final   • Lymphocytes, Absolute 01/10/2023 1.79  0.70 - 3.10 10*3/mm3 Final   • Monocytes, Absolute 01/10/2023 0.33  0.10 - 0.90 10*3/mm3 Final   • Eosinophils, Absolute 01/10/2023 0.08  0.00 - 0.40 10*3/mm3 Final   • Basophils, Absolute 01/10/2023 0.01  0.00 - 0.20 10*3/mm3 Final   • Immature Grans, Absolute 01/10/2023 0.01  0.00 - 0.05 10*3/mm3 Final   • nRBC 01/10/2023 0.0  0.0 - 0.2 /100 WBC Final   • Extra Tube 01/10/2023 Hold for add-ons.   Final    Auto resulted.   • Extra Tube 01/10/2023 hold for add-on   Final    Auto resulted   • Extra Tube 01/10/2023 Hold for add-ons.   Final    Auto resulted.   • Extra Tube 01/10/2023 Hold for add-ons.   Final    Auto resulted   • RBC, UA 01/10/2023 Too Numerous to Count (A)  None Seen, 0-2 /HPF Final   • WBC, UA 01/10/2023 6-12 (A)  None Seen, 0-2 /HPF Final   • Bacteria, UA 01/10/2023 None Seen  None Seen /HPF Final   • Squamous Epithelial Cells, UA 01/10/2023 3-6 (A)  None Seen, 0-2 /HPF Final   • Hyaline Casts, UA 01/10/2023 None Seen  None Seen /LPF Final   • Methodology 01/10/2023 Automated Microscopy   Final   • Urine Culture 01/10/2023 No growth   Final        Procedure:     Urethral dilation-after an appropriate informed consent, the patient was brought to the procedure suite.  The urethra was gently anesthetized with 10 mL of 2% viscous Xylocaine jelly.  After an adequate period of topical anesthesia I went ahead and dilated with [place] sounds from 16 to 26 [place] sequentially without complication. The patient was given gentamicin as prophylaxis with 80 mg.    Assessment/Plan:   Posttraumatic urethral stricture status post dilation  Narcotic pain medication-patient has significant acute pain that I believe would be an indication for the use of narcotic pain medication.  I discussed the significant risks of pain medication and the fact that this will be a short only option and I will give her no more than a  three-day supply of pain medication, I will not plan long-term medication, and that this will be sent to a pain clinic if it at all becomes necessary.  We discussed signing a pain medication agreement and the fact that we're going to run a state [place] review to be sure the patient is not getting pain medication from elsewhere.  If this is the case, we will not give pain medication as part of the patient's treatment plan of there being prescribed a controlled substance with potential for abuse.  This patient has been well aware of the appropriate dose of such medications including the risks for somnolence, limited ability to drive and/or safety and the significant potential for overdose.  It has been made clear that these medications are for the prescribed patient only without concomitant use of alcohol or other substance unless prescribed by the medical provider.  Has completed prescribing agreement detailing the terms of continue prescribing him a controlled substance including monitoring Luis Alberto reports, the possibility of urine drug screens, and pill counts.  The patient is aware that we review LUIS ALBERTO reports on a regular basis and scan them into the chart.  History and physical examination exhibited continued safe and appropriate use of controlled substances. We also discussed the fact that the new [place] legislation allows only a three-day prescription for pain medication.  In this situation he will be referred to a chronic pain clinic.              This document has been electronically signed by VERENICE FINK MD March 28, 2023 10:48 EDT    Dictated Utilizing Dragon Dictation: Part of this note may be an electronic transcription/translation of spoken language to printed text using the Dragon Dictation System.

## 2023-04-11 ENCOUNTER — APPOINTMENT (OUTPATIENT)
Dept: GENERAL RADIOLOGY | Facility: HOSPITAL | Age: 48
End: 2023-04-11
Payer: COMMERCIAL

## 2023-04-11 ENCOUNTER — HOSPITAL ENCOUNTER (EMERGENCY)
Facility: HOSPITAL | Age: 48
Discharge: HOME | End: 2023-04-11
Attending: STUDENT IN AN ORGANIZED HEALTH CARE EDUCATION/TRAINING PROGRAM | Admitting: STUDENT IN AN ORGANIZED HEALTH CARE EDUCATION/TRAINING PROGRAM
Payer: COMMERCIAL

## 2023-04-11 VITALS
RESPIRATION RATE: 18 BRPM | TEMPERATURE: 98.9 F | SYSTOLIC BLOOD PRESSURE: 116 MMHG | OXYGEN SATURATION: 98 % | DIASTOLIC BLOOD PRESSURE: 52 MMHG | BODY MASS INDEX: 19.55 KG/M2 | HEART RATE: 92 BPM | WEIGHT: 129 LBS | HEIGHT: 68 IN

## 2023-04-11 DIAGNOSIS — R51.9 ACUTE NONINTRACTABLE HEADACHE, UNSPECIFIED HEADACHE TYPE: Primary | ICD-10-CM

## 2023-04-11 DIAGNOSIS — J06.9 UPPER RESPIRATORY TRACT INFECTION, UNSPECIFIED TYPE: ICD-10-CM

## 2023-04-11 PROCEDURE — 96374 THER/PROPH/DIAG INJ IV PUSH: CPT

## 2023-04-11 PROCEDURE — 25010000002 ONDANSETRON PER 1 MG: Performed by: PHYSICIAN ASSISTANT

## 2023-04-11 PROCEDURE — 25010000002 HEPARIN LOCK FLUSH PER 10 UNITS: Performed by: STUDENT IN AN ORGANIZED HEALTH CARE EDUCATION/TRAINING PROGRAM

## 2023-04-11 PROCEDURE — 25010000002 DIPHENHYDRAMINE PER 50 MG: Performed by: PHYSICIAN ASSISTANT

## 2023-04-11 PROCEDURE — 25010000002 BUTORPHANOL PER 1 MG: Performed by: STUDENT IN AN ORGANIZED HEALTH CARE EDUCATION/TRAINING PROGRAM

## 2023-04-11 PROCEDURE — 25010000002 DEXAMETHASONE SODIUM PHOSPHATE 10 MG/ML SOLUTION: Performed by: PHYSICIAN ASSISTANT

## 2023-04-11 PROCEDURE — 96375 TX/PRO/DX INJ NEW DRUG ADDON: CPT

## 2023-04-11 PROCEDURE — 71045 X-RAY EXAM CHEST 1 VIEW: CPT

## 2023-04-11 PROCEDURE — 93010 ELECTROCARDIOGRAM REPORT: CPT | Performed by: INTERNAL MEDICINE

## 2023-04-11 PROCEDURE — 93005 ELECTROCARDIOGRAM TRACING: CPT | Performed by: STUDENT IN AN ORGANIZED HEALTH CARE EDUCATION/TRAINING PROGRAM

## 2023-04-11 PROCEDURE — 99283 EMERGENCY DEPT VISIT LOW MDM: CPT

## 2023-04-11 RX ORDER — HEPARIN SODIUM (PORCINE) LOCK FLUSH IV SOLN 100 UNIT/ML 100 UNIT/ML
300 SOLUTION INTRAVENOUS ONCE
Status: COMPLETED | OUTPATIENT
Start: 2023-04-11 | End: 2023-04-11

## 2023-04-11 RX ORDER — ONDANSETRON 2 MG/ML
4 INJECTION INTRAMUSCULAR; INTRAVENOUS ONCE
Status: COMPLETED | OUTPATIENT
Start: 2023-04-11 | End: 2023-04-11

## 2023-04-11 RX ORDER — DEXAMETHASONE SODIUM PHOSPHATE 10 MG/ML
10 INJECTION, SOLUTION INTRAMUSCULAR; INTRAVENOUS ONCE
Status: COMPLETED | OUTPATIENT
Start: 2023-04-11 | End: 2023-04-11

## 2023-04-11 RX ORDER — DIPHENHYDRAMINE HYDROCHLORIDE 50 MG/ML
25 INJECTION INTRAMUSCULAR; INTRAVENOUS ONCE
Status: COMPLETED | OUTPATIENT
Start: 2023-04-11 | End: 2023-04-11

## 2023-04-11 RX ADMIN — Medication 300 UNITS: at 20:47

## 2023-04-11 RX ADMIN — SODIUM CHLORIDE 1000 ML: 9 INJECTION, SOLUTION INTRAVENOUS at 19:53

## 2023-04-11 RX ADMIN — ONDANSETRON 4 MG: 2 INJECTION INTRAMUSCULAR; INTRAVENOUS at 19:54

## 2023-04-11 RX ADMIN — DEXAMETHASONE SODIUM PHOSPHATE 10 MG: 10 INJECTION INTRAMUSCULAR; INTRAVENOUS at 19:54

## 2023-04-11 RX ADMIN — DIPHENHYDRAMINE HYDROCHLORIDE 25 MG: 50 INJECTION INTRAMUSCULAR; INTRAVENOUS at 19:54

## 2023-04-11 RX ADMIN — BUTORPHANOL TARTRATE 2 MG: 2 INJECTION, SOLUTION INTRAMUSCULAR; INTRAVENOUS at 19:54

## 2023-04-12 LAB
QT INTERVAL: 428 MS
QTC INTERVAL: 441 MS

## 2023-04-12 NOTE — ED PROVIDER NOTES
Subjective   History of Present Illness  48-year-old female presents secondary to headache x5 days.  She states this feels very similar to her migraine headaches.  She reports having congestion cough for approximately a week.  Her  has very similar symptoms.  She denies any falls or injuries.  She denies any fever.  She denies any abdominal pain.  She states that her chest is sore from coughing however she denies any pressure tightness squeezing in her chest.  She voices no other complaints this time.  She has a past medical history of migraine, PTSD, IBS, fibromyalgia.  She arrived by private vehicle.        Review of Systems   Constitutional: Negative.  Negative for fever.   Respiratory: Negative.    Cardiovascular: Negative.  Negative for chest pain.   Gastrointestinal: Positive for nausea and vomiting. Negative for abdominal pain.   Endocrine: Negative.    Genitourinary: Negative.  Negative for dysuria.   Skin: Negative.    Neurological: Positive for headaches.   Psychiatric/Behavioral: Negative.    All other systems reviewed and are negative.      Past Medical History:   Diagnosis Date   • Abdominal pain    • Abdominal swelling    • Hoyos's disease    • Bipolar 1 disorder    • Brain tumor     R Frontal Lobe per pt   • Brain tumor 2014   • Brain tumor (benign)    • Constipation    • DDD (degenerative disc disease), cervical 05/29/2017   • DDD (degenerative disc disease), cervical    • Diarrhea    • Fibromyalgia    • IBS (irritable bowel syndrome)    • Migraine    • Nausea & vomiting    • PONV (postoperative nausea and vomiting)    • PTSD (post-traumatic stress disorder)    • Rectal bleeding        Allergies   Allergen Reactions   • Ativan [Lorazepam] Hallucinations     confusion   • Sulfa Antibiotics Shortness Of Breath and Swelling   • Sulfa Antibiotics Anaphylaxis   • Reglan [Metoclopramide] Angioedema   • Compazine [Prochlorperazine Edisylate] Hives   • Demerol [Meperidine] Hives   • Droperidol  Itching   • Metoclopramide Swelling   • Toradol [Ketorolac Tromethamine] Hives and Itching   • Toradol [Ketorolac Tromethamine] Hives   • Zofran [Ondansetron Hcl] Rash   • Zosyn [Piperacillin Sod-Tazobactam So] Hives       Past Surgical History:   Procedure Laterality Date   • ANAL SCOPE N/A 2016    Procedure: ANAL SCOPE;  Surgeon: Kael Lopez MD;  Location: [facility] OR;  Service:    • APPENDECTOMY     • COLONOSCOPY N/A 2016    Procedure: COLONOSCOPY  CPTCODE:91987;  Surgeon: Jose Antonio Belle III, MD;  Location: [facility] OR;  Service:    • COLONOSCOPY N/A 2016    Procedure: COLONOSCOPY (34759) CPT;  Surgeon: Jose Antonio Belle III, MD;  Location: [facility] OR;  Service:    • CYSTOSCOPY RETROGRADE PYELOGRAM Bilateral 2017    Procedure: CYSTOSCOPY RETROGRADE PYELOGRAM;  Surgeon: Mu Blunt MD;  Location: [facility] OR;  Service:    • ENDOSCOPY N/A 2016    Procedure: ESOPHAGOGASTRODUODENOSCOPY WITH BIOPSY  CPTCODE:25450;  Surgeon: Jose Antonio Belle III, MD;  Location: [facility] OR;  Service:    • HEMORRHOIDECTOMY N/A 2016    Procedure: HEMORRHOID STAPLING;  Surgeon: Kael Lopez MD;  Location: [facility] OR;  Service:    • HYSTERECTOMY     • KNEE SURGERY     • LAPAROSCOPIC SALPINGOOPHERECTOMY     • PORTACATH PLACEMENT N/A 2017    Procedure: INSERTION OF PORTACATH;  Surgeon: Celso Arredondo MD;  Location: [facility] OR;  Service:    • SHOULDER SURGERY      3 times       Family History   Problem Relation Age of Onset   • Crohn's disease Other    • Hypertension Other    • Diabetes Other    • Irritable bowel syndrome Other    • No Known Problems Father    • No Known Problems Mother        Social History     Socioeconomic History   • Marital status:    Tobacco Use   • Smoking status: Former     Packs/day: 1.00     Years: 20.00     Pack years: 20.00     Types: Cigarettes     Quit date: 2015     Years since quittin.4   • Smokeless tobacco: Never   Vaping Use   • Vaping  Use: Never used   Substance and Sexual Activity   • Alcohol use: No   • Drug use: Yes     Types: Marijuana     Comment: for pain   • Sexual activity: Defer     Birth control/protection: Surgical           Objective   Physical Exam  Vitals and nursing note reviewed.   Constitutional:       General: She is not in acute distress.     Appearance: She is well-developed. She is not diaphoretic.   HENT:      Head: Normocephalic and atraumatic.      Right Ear: External ear normal.      Left Ear: External ear normal.      Nose: Nose normal.   Eyes:      Conjunctiva/sclera: Conjunctivae normal.      Pupils: Pupils are equal, round, and reactive to light.   Neck:      Vascular: No JVD.      Trachea: No tracheal deviation.   Cardiovascular:      Rate and Rhythm: Normal rate and regular rhythm.      Heart sounds: Normal heart sounds. No murmur heard.  Pulmonary:      Effort: Pulmonary effort is normal. No respiratory distress.      Breath sounds: Normal breath sounds. No wheezing.   Abdominal:      General: Bowel sounds are normal.      Palpations: Abdomen is soft.      Tenderness: There is no abdominal tenderness.   Musculoskeletal:         General: No deformity. Normal range of motion.      Cervical back: Normal range of motion and neck supple.   Skin:     General: Skin is warm and dry.      Coloration: Skin is not pale.      Findings: No erythema or rash.   Neurological:      Mental Status: She is alert and oriented to person, place, and time.      Cranial Nerves: No cranial nerve deficit.   Psychiatric:         Behavior: Behavior normal.         Thought Content: Thought content normal.         Procedures           ED Course  ED Course as of 04/11/23 2110   Tue Apr 11, 2023   1905 ECG 18:45 NSR, rate 64. Nonspecific ST abnormality. qT/QTc 428/441 [DURAN]      ED Course User Index  [DURAN] Mitesh Garza MD               XR Chest 1 View    Result Date: 4/11/2023  Emphysematous changes. No acute cardiopulmonary findings. Signer  Name: Rishabh Bowen MD  Signed: 4/11/2023 7:36 PM  Workstation Name: [id number]  [facility]                                 Medical Decision Making  48-year-old female presents secondary to headache x5 days.  She states this feels very similar to her migraine headaches.  She reports having congestion cough for approximately a week.  Her  has very similar symptoms.  She denies any falls or injuries.  She denies any fever.  She denies any abdominal pain.  She states that her chest is sore from coughing however she denies any pressure tightness squeezing in her chest.  She voices no other complaints this time.  She has a past medical history of migraine, PTSD, IBS, fibromyalgia.  She arrived by private vehicle.    Acute nonintractable headache, unspecified headache type: complicated acute illness or injury  Upper respiratory tract infection, unspecified type: complicated acute illness or injury  Amount and/or Complexity of Data Reviewed  Radiology: ordered. Decision-making details documented in ED Course.  ECG/medicine tests: ordered. Decision-making details documented in ED Course.      Risk  Prescription drug management.    Risk Details: Patient was counseled on her diagnostic work-up along with signs and symptoms worsening.  She voices understanding and is understanding of proper follow-up.        Final diagnoses:   Acute nonintractable headache, unspecified headache type   Upper respiratory tract infection, unspecified type       ED Disposition  ED Disposition     ED Disposition   Discharge    Condition   Stable    Comment   --             Mabel Patterson, APRN  [address] [address]  [phone]    In 2 days  if persists         Medication List      No changes were made to your prescriptions during this visit.          Girish Morales PA  04/11/23 2114

## 2023-04-13 ENCOUNTER — TELEPHONE (OUTPATIENT)
Dept: UROLOGY | Facility: CLINIC | Age: 48
End: 2023-04-13
Payer: COMMERCIAL

## 2023-04-13 DIAGNOSIS — R35.0 FREQUENCY OF MICTURITION: ICD-10-CM

## 2023-04-13 DIAGNOSIS — N23 RENAL COLIC: ICD-10-CM

## 2023-04-14 RX ORDER — PROMETHAZINE HYDROCHLORIDE 25 MG/1
TABLET ORAL
Qty: 21 TABLET | Refills: 5 | OUTPATIENT
Start: 2023-04-14

## 2023-04-19 ENCOUNTER — HOSPITAL ENCOUNTER (EMERGENCY)
Facility: HOSPITAL | Age: 48
Discharge: LEFT BEFORE DISCHARGE | End: 2023-04-19
Attending: STUDENT IN AN ORGANIZED HEALTH CARE EDUCATION/TRAINING PROGRAM
Payer: COMMERCIAL

## 2023-04-19 VITALS
RESPIRATION RATE: 16 BRPM | WEIGHT: 125 LBS | OXYGEN SATURATION: 99 % | TEMPERATURE: 98.4 F | BODY MASS INDEX: 18.94 KG/M2 | HEIGHT: 68 IN | DIASTOLIC BLOOD PRESSURE: 47 MMHG | HEART RATE: 84 BPM | SYSTOLIC BLOOD PRESSURE: 112 MMHG

## 2023-04-19 DIAGNOSIS — R33.9 ACUTE ON CHRONIC URINARY RETENTION: Primary | ICD-10-CM

## 2023-04-19 LAB
BACTERIA UR QL AUTO: ABNORMAL /HPF
BILIRUB UR QL STRIP: NEGATIVE
CLARITY UR: ABNORMAL
COLOR UR: ABNORMAL
GLUCOSE UR STRIP-MCNC: NEGATIVE MG/DL
HGB UR QL STRIP.AUTO: ABNORMAL
HYALINE CASTS UR QL AUTO: ABNORMAL /LPF
KETONES UR QL STRIP: NEGATIVE
LEUKOCYTE ESTERASE UR QL STRIP.AUTO: ABNORMAL
NITRITE UR QL STRIP: NEGATIVE
PH UR STRIP.AUTO: 6 [PH] (ref 5–8)
PROT UR QL STRIP: ABNORMAL
RBC # UR STRIP: ABNORMAL /HPF
REF LAB TEST METHOD: ABNORMAL
SP GR UR STRIP: 1.01 (ref 1–1.03)
SQUAMOUS #/AREA URNS HPF: ABNORMAL /HPF
UROBILINOGEN UR QL STRIP: ABNORMAL
WBC # UR STRIP: ABNORMAL /HPF

## 2023-04-19 PROCEDURE — 81001 URINALYSIS AUTO W/SCOPE: CPT | Performed by: EMERGENCY MEDICINE

## 2023-04-19 PROCEDURE — 99282 EMERGENCY DEPT VISIT SF MDM: CPT

## 2023-04-19 NOTE — ED PROVIDER NOTES
Subjective   History of Present Illness  48-year-old female with past medical history of urinary retention and Buerger's disease presents to the ER due to concerns for increasing abdominal pain and urinary retention.  Patient does self cath at home.  Patient notes persistent abdominal pain.  Patient noted that she does see her urologist within the next few days.  Patient immediately asked for pain medicine for treatment of her symptoms.  I explained to the patient that we would need to complete further work-up to understand what is going on before we can treat any symptoms.  Patient expressed displeasure in this.  Vital stable.  Afebrile.        Review of Systems   Gastrointestinal: Positive for abdominal pain.   Genitourinary: Positive for difficulty urinating and dysuria.   All other systems reviewed and are negative.      Past Medical History:   Diagnosis Date   • Abdominal pain    • Abdominal swelling    • Hoyos's disease    • Bipolar 1 disorder    • Brain tumor     R Frontal Lobe per pt   • Brain tumor 2014   • Brain tumor (benign)    • Constipation    • DDD (degenerative disc disease), cervical 05/29/2017   • DDD (degenerative disc disease), cervical    • Diarrhea    • Fibromyalgia    • IBS (irritable bowel syndrome)    • Migraine    • Nausea & vomiting    • PONV (postoperative nausea and vomiting)    • PTSD (post-traumatic stress disorder)    • Rectal bleeding        Allergies   Allergen Reactions   • Ativan [Lorazepam] Hallucinations     confusion   • Sulfa Antibiotics Shortness Of Breath and Swelling   • Sulfa Antibiotics Anaphylaxis   • Reglan [Metoclopramide] Angioedema   • Compazine [Prochlorperazine Edisylate] Hives   • Demerol [Meperidine] Hives   • Droperidol Itching   • Metoclopramide Swelling   • Toradol [Ketorolac Tromethamine] Hives and Itching   • Toradol [Ketorolac Tromethamine] Hives   • Zofran [Ondansetron Hcl] Rash   • Zosyn [Piperacillin Sod-Tazobactam So] Hives       Past Surgical History:    Procedure Laterality Date   • ANAL SCOPE N/A 2016    Procedure: ANAL SCOPE;  Surgeon: Kael Lopez MD;  Location: [facility] OR;  Service:    • APPENDECTOMY     • COLONOSCOPY N/A 2016    Procedure: COLONOSCOPY  CPTCODE:17651;  Surgeon: Jose Antonio Belle III, MD;  Location: [facility] OR;  Service:    • COLONOSCOPY N/A 2016    Procedure: COLONOSCOPY (25554) CPT;  Surgeon: Jose Antonio Belle III, MD;  Location: [facility] OR;  Service:    • CYSTOSCOPY RETROGRADE PYELOGRAM Bilateral 2017    Procedure: CYSTOSCOPY RETROGRADE PYELOGRAM;  Surgeon: Mu Blunt MD;  Location: [facility] OR;  Service:    • ENDOSCOPY N/A 2016    Procedure: ESOPHAGOGASTRODUODENOSCOPY WITH BIOPSY  CPTCODE:97995;  Surgeon: Jose Antonio Belle III, MD;  Location: [facility] OR;  Service:    • HEMORRHOIDECTOMY N/A 2016    Procedure: HEMORRHOID STAPLING;  Surgeon: Kael Lopez MD;  Location: [facility] OR;  Service:    • HYSTERECTOMY     • KNEE SURGERY     • LAPAROSCOPIC SALPINGOOPHERECTOMY     • PORTACATH PLACEMENT N/A 2017    Procedure: INSERTION OF PORTACATH;  Surgeon: Celso Arredondo MD;  Location: [facility] OR;  Service:    • SHOULDER SURGERY      3 times       Family History   Problem Relation Age of Onset   • Crohn's disease Other    • Hypertension Other    • Diabetes Other    • Irritable bowel syndrome Other    • No Known Problems Father    • No Known Problems Mother        Social History     Socioeconomic History   • Marital status:    Tobacco Use   • Smoking status: Former     Packs/day: 1.00     Years: 20.00     Pack years: 20.00     Types: Cigarettes     Quit date: 2015     Years since quittin.4   • Smokeless tobacco: Never   Vaping Use   • Vaping Use: Never used   Substance and Sexual Activity   • Alcohol use: No   • Drug use: Yes     Types: Marijuana     Comment: for pain   • Sexual activity: Defer     Birth control/protection: Surgical           Objective   Physical  Exam  Constitutional:       General: She is not in acute distress.     Appearance: Normal appearance. She is not ill-appearing.   HENT:      Head: Normocephalic and atraumatic.      Right Ear: External ear normal.      Left Ear: External ear normal.      Nose: Nose normal.      Mouth/Throat:      Mouth: Mucous membranes are moist.   Eyes:      Extraocular Movements: Extraocular movements intact.      Pupils: Pupils are equal, round, and reactive to light.   Cardiovascular:      Rate and Rhythm: Normal rate and regular rhythm.      Heart sounds: No murmur heard.  Pulmonary:      Effort: Pulmonary effort is normal. No respiratory distress.      Breath sounds: Normal breath sounds. No wheezing.   Abdominal:      General: Bowel sounds are normal.      Palpations: Abdomen is soft.      Tenderness: There is generalized abdominal tenderness and tenderness in the suprapubic area.   Musculoskeletal:         General: No deformity or signs of injury. Normal range of motion.      Cervical back: Normal range of motion and neck supple.   Skin:     General: Skin is warm and dry.      Findings: No erythema.   Neurological:      General: No focal deficit present.      Mental Status: She is alert and oriented to person, place, and time. Mental status is at baseline.      Cranial Nerves: No cranial nerve deficit.   Psychiatric:         Mood and Affect: Mood normal.         Behavior: Behavior normal.         Thought Content: Thought content normal.         Procedures           ED Course                                           Medical Decision Making  Patient left the ER without notifying ER staff.  Patient was witnessed leaving the ER without notifying any staff.  AMA discharge protocol cannot be discussed with the patient.    Acute on chronic urinary retention: acute illness or injury      Final diagnoses:   Acute on chronic urinary retention       ED Disposition  ED Disposition     ED Disposition   AMA    Condition   --    Comment    --             Mabel Patterson, APRN  [address] [address]  [phone]    In 1 week      [facility] Emergency Department  [address] [place] [id number] [phone]    If symptoms worsen         Medication List      No changes were made to your prescriptions during this visit.          Jean Paul Hussein,   04/19/23 1723

## 2023-04-19 NOTE — ED NOTES
Came back from taking another patient to floor and patient is gone. AMA paper laying on desk, but not signed.

## 2023-04-25 ENCOUNTER — OFFICE VISIT (OUTPATIENT)
Dept: UROLOGY | Facility: CLINIC | Age: 48
End: 2023-04-25
Payer: COMMERCIAL

## 2023-04-25 VITALS
BODY MASS INDEX: 18.94 KG/M2 | HEART RATE: 120 BPM | DIASTOLIC BLOOD PRESSURE: 92 MMHG | SYSTOLIC BLOOD PRESSURE: 138 MMHG | WEIGHT: 125 LBS | HEIGHT: 68 IN

## 2023-04-25 DIAGNOSIS — N35.028 OTHER POST-TRAUMATIC URETHRAL STRICTURE, FEMALE: Primary | ICD-10-CM

## 2023-04-25 DIAGNOSIS — R35.0 FREQUENCY OF MICTURITION: ICD-10-CM

## 2023-04-25 DIAGNOSIS — N23 RENAL COLIC: ICD-10-CM

## 2023-04-25 RX ORDER — OXYCODONE AND ACETAMINOPHEN 10; 325 MG/1; MG/1
1 TABLET ORAL EVERY 6 HOURS PRN
Qty: 20 TABLET | Refills: 0 | Status: SHIPPED | OUTPATIENT
Start: 2023-04-25

## 2023-04-25 RX ORDER — TAMSULOSIN HYDROCHLORIDE 0.4 MG/1
1 CAPSULE ORAL EVERY MORNING
COMMUNITY
Start: 2023-04-14 | End: 2023-04-25 | Stop reason: ALTCHOICE

## 2023-04-25 RX ORDER — PROMETHAZINE HYDROCHLORIDE 25 MG/1
25 TABLET ORAL EVERY 6 HOURS PRN
Qty: 21 TABLET | Refills: 5 | Status: SHIPPED | OUTPATIENT
Start: 2023-04-25

## 2023-04-25 NOTE — PROGRESS NOTES
Chief Complaint:      Chief Complaint   Patient presents with   • Other post-traumatic urethral stricture, female       HPI:   48 y.o. female for dilation has severe post traumatic urethral stricture    Past Medical History:     Past Medical History:   Diagnosis Date   • Abdominal pain    • Abdominal swelling    • Hoyos's disease    • Bipolar 1 disorder    • Brain tumor     R Frontal Lobe per pt   • Brain tumor 2014   • Brain tumor (benign)    • Constipation    • DDD (degenerative disc disease), cervical 05/29/2017   • DDD (degenerative disc disease), cervical    • Diarrhea    • Fibromyalgia    • IBS (irritable bowel syndrome)    • Migraine    • Nausea & vomiting    • PONV (postoperative nausea and vomiting)    • PTSD (post-traumatic stress disorder)    • Rectal bleeding        Current Meds:     Current Outpatient Medications   Medication Sig Dispense Refill   • albuterol (PROVENTIL HFA;VENTOLIN HFA) 108 (90 Base) MCG/ACT inhaler Inhale 2 puffs 2 (Two) Times a Day.     • Azelastine HCl 137 MCG/SPRAY solution 1 spray into the nostril(s) as directed by provider As Needed (Allergies).     • busPIRone (BUSPAR) 15 MG tablet Take 1 tablet by mouth 3 (Three) Times a Day.     • diclofenac (VOLTAREN) 1 % gel gel      • divalproex (DEPAKOTE) 500 MG DR tablet Take 1 tablet by mouth 3 (Three) Times a Day. 90 tablet 2   • docusate sodium (COLACE) 100 MG capsule Take 1 capsule by mouth 2 (Two) Times a Day. 20 capsule 0   • docusate sodium (COLACE) 100 MG capsule Take 1 capsule by mouth 2 (Two) Times a Day As Needed for Constipation. 60 capsule 0   • fluticasone (VERAMYST) 27.5 MCG/SPRAY nasal spray 2 sprays into the nostril(s) as directed by provider Daily.     • megestrol (MEGACE) 20 MG tablet Take 1 tablet by mouth Daily. 30 tablet 3   • methylPREDNISolone (MEDROL) 4 MG dose pack Take as directed on package instructions. 21 tablet 0   • metroNIDAZOLE (FLAGYL) 500 MG tablet Take 1 tablet by mouth 3 (Three) Times a Day. 30  tablet 0   • montelukast (SINGULAIR) 10 MG tablet Take 1 tablet by mouth Every Night.     • ondansetron ODT (ZOFRAN-ODT) 4 MG disintegrating tablet Place 2 tablets on the tongue Every 8 (Eight) Hours As Needed for Nausea or Vomiting. 30 tablet 0   • oxyCODONE-acetaminophen (Percocet)  MG per tablet Take 1 tablet by mouth Every 6 (Six) Hours As Needed for Moderate Pain. 20 tablet 0   • oxyCODONE-acetaminophen (PERCOCET) 5-325 MG per tablet Take 1 tablet by mouth Every 8 (Eight) Hours As Needed for Moderate Pain . 12 tablet 0   • pantoprazole (PROTONIX) 40 MG EC tablet Take 1 tablet by mouth 2 (Two) Times a Day As Needed.     • phenazopyridine (PYRIDIUM) 100 MG tablet Take 1 tablet by mouth 3 (Three) Times a Day As Needed for Bladder Spasms. 30 tablet 1   • polyethylene glycol (MIRALAX) 17 GM/SCOOP powder Take 17 g by mouth Daily. 225 g 0   • promethazine (PHENERGAN) 25 MG tablet Take 1 tablet by mouth Every 6 (Six) Hours As Needed for Nausea or Vomiting. 21 tablet 5   • promethazine (PHENERGAN) 25 MG tablet Take 1 tablet by mouth Every 6 (Six) Hours As Needed for Nausea or Vomiting. 15 tablet 0   • sertraline (ZOLOFT) 100 MG tablet Take 1 tablet by mouth 2 (Two) Times a Day.     • SUMAtriptan (IMITREX) 6 MG/0.5ML injection Inject 6 mg under the skin 1 (One) Time.     • traMADol (ULTRAM) 50 MG tablet      • ciprofloxacin (CIPRO) 500 MG tablet Take 1 tablet by mouth 2 (Two) Times a Day. (Patient not taking: Reported on 4/25/2023) 19 tablet 0   • terazosin (HYTRIN) 2 MG capsule Take 1 capsule by mouth Every Night for 14 days. 14 capsule 0     No current facility-administered medications for this visit.        Allergies:      Allergies   Allergen Reactions   • Ativan [Lorazepam] Hallucinations     confusion   • Sulfa Antibiotics Shortness Of Breath and Swelling   • Sulfa Antibiotics Anaphylaxis   • Reglan [Metoclopramide] Angioedema   • Compazine [Prochlorperazine Edisylate] Hives   • Demerol [Meperidine] Hives   •  Droperidol Itching   • Metoclopramide Swelling   • Toradol [Ketorolac Tromethamine] Hives and Itching   • Toradol [Ketorolac Tromethamine] Hives   • Zofran [Ondansetron Hcl] Rash   • Zosyn [Piperacillin Sod-Tazobactam So] Hives        Past Surgical History:     Past Surgical History:   Procedure Laterality Date   • ANAL SCOPE N/A 2016    Procedure: ANAL SCOPE;  Surgeon: Kael Lopez MD;  Location: [facility] OR;  Service:    • APPENDECTOMY     • COLONOSCOPY N/A 2016    Procedure: COLONOSCOPY  CPTCODE:88867;  Surgeon: Jose Antonio Belle III, MD;  Location: [facility] OR;  Service:    • COLONOSCOPY N/A 2016    Procedure: COLONOSCOPY (91858) CPT;  Surgeon: Jose Antonio Belle III, MD;  Location: [facility] OR;  Service:    • CYSTOSCOPY RETROGRADE PYELOGRAM Bilateral 2017    Procedure: CYSTOSCOPY RETROGRADE PYELOGRAM;  Surgeon: Mu Blunt MD;  Location: [facility] OR;  Service:    • ENDOSCOPY N/A 2016    Procedure: ESOPHAGOGASTRODUODENOSCOPY WITH BIOPSY  CPTCODE:65258;  Surgeon: Jose Antonio Belle III, MD;  Location: [facility] OR;  Service:    • HEMORRHOIDECTOMY N/A 2016    Procedure: HEMORRHOID STAPLING;  Surgeon: Kael Lopez MD;  Location: [facility] OR;  Service:    • HYSTERECTOMY     • KNEE SURGERY     • LAPAROSCOPIC SALPINGOOPHERECTOMY     • PORTACATH PLACEMENT N/A 2017    Procedure: INSERTION OF PORTACATH;  Surgeon: Celso Arredondo MD;  Location: [facility] OR;  Service:    • SHOULDER SURGERY      3 times       Social History:     Social History     Socioeconomic History   • Marital status:    Tobacco Use   • Smoking status: Former     Packs/day: 1.00     Years: 20.00     Pack years: 20.00     Types: Cigarettes     Quit date: 2015     Years since quittin.4   • Smokeless tobacco: Never   Vaping Use   • Vaping Use: Never used   Substance and Sexual Activity   • Alcohol use: No   • Drug use: Yes     Types: Marijuana     Comment: for pain   • Sexual activity: Defer      Birth control/protection: Surgical       Family History:     Family History   Problem Relation Age of Onset   • Crohn's disease Other    • Hypertension Other    • Diabetes Other    • Irritable bowel syndrome Other    • No Known Problems Father    • No Known Problems Mother        Review of Systems:     Review of Systems   Constitutional: Negative.  Negative for activity change, appetite change, chills, diaphoresis, fatigue and unexpected weight change.   HENT: Negative for congestion, dental problem, drooling, ear discharge, ear pain, facial swelling, hearing loss, mouth sores, nosebleeds, postnasal drip, rhinorrhea, sinus pressure, sneezing, sore throat, tinnitus, trouble swallowing and voice change.    Eyes: Negative.  Negative for photophobia, pain, discharge, redness, itching and visual disturbance.   Respiratory: Negative.  Negative for apnea, cough, choking, chest tightness, shortness of breath, wheezing and stridor.    Cardiovascular: Negative.  Negative for chest pain, palpitations and leg swelling.   Gastrointestinal: Negative.  Negative for abdominal distention, abdominal pain, anal bleeding, blood in stool, constipation, diarrhea, nausea, rectal pain and vomiting.   Endocrine: Negative.  Negative for cold intolerance, heat intolerance, polydipsia, polyphagia and polyuria.   Musculoskeletal: Negative.  Negative for arthralgias, back pain, gait problem, joint swelling, myalgias, neck pain and neck stiffness.   Skin: Negative.  Negative for color change, pallor, rash and wound.   Allergic/Immunologic: Negative.  Negative for environmental allergies, food allergies and immunocompromised state.   Neurological: Negative.  Negative for dizziness, tremors, seizures, syncope, facial asymmetry, speech difficulty, weakness, light-headedness, numbness and headaches.   Hematological: Negative.  Negative for adenopathy. Does not bruise/bleed easily.   Psychiatric/Behavioral: Negative for agitation, behavioral problems,  confusion, decreased concentration, dysphoric mood, hallucinations, self-injury, sleep disturbance and suicidal ideas. The patient is not nervous/anxious and is not hyperactive.    All other systems reviewed and are negative.      Physical Exam:     Physical Exam  Constitutional:       Appearance: She is well-developed.   HENT:      Head: Normocephalic and atraumatic.      Right Ear: External ear normal.      Left Ear: External ear normal.   Eyes:      Conjunctiva/sclera: Conjunctivae normal.      Pupils: Pupils are equal, round, and reactive to light.   Cardiovascular:      Rate and Rhythm: Normal rate and regular rhythm.      Heart sounds: Normal heart sounds.   Pulmonary:      Effort: Pulmonary effort is normal.      Breath sounds: Normal breath sounds.   Abdominal:      General: Bowel sounds are normal. There is no distension.      Palpations: Abdomen is soft. There is no mass.      Tenderness: There is no abdominal tenderness. There is no guarding or rebound.   Genitourinary:     General: Normal vulva.      Vagina: No vaginal discharge.   Musculoskeletal:         General: Normal range of motion.   Skin:     General: Skin is warm and dry.   Neurological:      Mental Status: She is alert.      Deep Tendon Reflexes: Reflexes are normal and symmetric.   Psychiatric:         Behavior: Behavior normal.         Thought Content: Thought content normal.         Judgment: Judgment normal.         I have reviewed the following portions of the patient's history: Allergies, current medications, past family history, past medical history, past social history, past surgical history, problem list, and ROS and confirm it is accurate.    Recent Image (CT and/or KUB):      CT Abdomen and Pelvis: No results found for this or any previous visit.       CT Stone Protocol: Results for orders placed during the hospital encounter of 03/16/23    CT Abdomen Pelvis Stone Protocol    Narrative  CT ABDOMEN AND PELVIS, NONCONTRAST,  3/16/2023    HISTORY:  48-year-old female in the ED with lower abdomen pain, hematuria.    TECHNIQUE:  CT imaging of the abdomen and pelvis ordered as noncontrast kidney stone protocol exam. Radiation dose reduction techniques included automated exposure control. Radiation audit for CT and nuclear cardiology exams in the last 12 months: 0.    COMPARISON:  *  CT abdomen/pelvis, 10/3/2022.    ABDOMEN FINDINGS:  Both kidneys, both ureters and urinary bladder are normal in noncontrast CT appearance. No visible radiopaque renal or urinary tract stone material. No evidence of upper urinary tract obstruction.    No gallbladder distention or bile duct dilatation. Liver, pancreas and spleen are normal in size and appearance without contrast. Normal caliber abdominal aorta.    Small bowel and colon are normal in caliber and appearance, as imaged. The appendix is not directly visualized, but there is no indirect CT evidence of acute appendicitis or other active inflammation in the ileocecal region. No gastric distention, hiatal  hernia or distal esophageal dilatation.    PELVIS FINDINGS:  Hysterectomy. Urinary bladder and rectum are within normal limits. No free pelvic fluid. No inguinal hernia.    Lung base images show no active disease. There is some cylindrical bronchiectasis involving the posterior segment right lower lobe. Fluid opacification of one of the bronchiectatic segments caused a nodular appearance on prior imaging, but this has  resolved today. There is air trapping within this segment.    Mild chronic superior endplate vertebral compression fracture deformity is incidentally noted at L2, unchanged.    Impression  1. No acute abnormality within the abdomen or pelvis.  2. No visible nephrolithiasis or evidence of urinary obstruction.  3. Mild chronic bronchiectasis in the posterior right lower lobe.  4. Mild chronic fracture deformity of the L2 vertebral body.    Signer Name: Sang Painting MD  Signed:  3/16/2023 6:02 PM  Workstation Name: [id number]       KUB: Results for orders placed during the hospital encounter of 01/10/23    XR Abdomen KUB    Narrative  EXAM:  XR Abdomen, 1 View    EXAM DATE:  1/10/2023 12:05 PM    CLINICAL HISTORY:  flank pain    TECHNIQUE:  Frontal supine view of the abdomen/pelvis.    COMPARISON:  10/01/2022    FINDINGS:  Gastrointestinal tract:  Unremarkable.  No dilation.  Organs:  No stones identified along the expected course of ureters.  Bones/joints:  Unremarkable.  Soft tissues:  Surgical staples in the lower pelvis.  Vasculature:  Stable phleboliths lower pelvis.    Impression  1.  No acute findings radiographically evident.  2.  No radiographic evidence of renal or ureteral stones.    This report was finalized on 1/10/2023 12:47 PM by Dr. Ankit Naik MD.       Labs (past 3 months):      Admission on 04/19/2023, Discharged on 04/19/2023   Component Date Value Ref Range Status   • Color, UA 04/19/2023 Red (A)  Yellow, Straw Final   • Appearance, UA 04/19/2023 Cloudy (A)  Clear Final   • pH, UA 04/19/2023 6.0  5.0 - 8.0 Final   • Specific Gravity, UA 04/19/2023 1.012  1.005 - 1.030 Final   • Glucose, UA 04/19/2023 Negative  Negative Final   • Ketones, UA 04/19/2023 Negative  Negative Final   • Bilirubin, UA 04/19/2023 Negative  Negative Final   • Blood, UA 04/19/2023 Large (3+) (A)  Negative Final   • Protein, UA 04/19/2023 30 mg/dL (1+) (A)  Negative Final   • Leuk Esterase, UA 04/19/2023 Small (1+) (A)  Negative Final   • Nitrite, UA 04/19/2023 Negative  Negative Final   • Urobilinogen, UA 04/19/2023 0.2 E.U./dL  0.2 - 1.0 E.U./dL Final   • RBC, UA 04/19/2023 Too Numerous to Count (A)  None Seen, 0-2 /HPF Final   • WBC, UA 04/19/2023 3-5 (A)  None Seen, 0-2 /HPF Final   • Bacteria, UA 04/19/2023 None Seen  None Seen /HPF Final   • Squamous Epithelial Cells, UA 04/19/2023 0-2  None Seen, 0-2 /HPF Final   • Hyaline Casts, UA 04/19/2023 3-6   None Seen /LPF Final   • Methodology 04/19/2023 Automated Microscopy   Final   Admission on 04/11/2023, Discharged on 04/11/2023   Component Date Value Ref Range Status   • QT Interval 04/11/2023 428  ms Final   • QTC Interval 04/11/2023 441  ms Final   Admission on 03/24/2023, Discharged on 03/24/2023   Component Date Value Ref Range Status   • Glucose 03/24/2023 90  65 - 99 mg/dL Final   • BUN 03/24/2023 5 (L)  6 - 20 mg/dL Final   • Creatinine 03/24/2023 0.53 (L)  0.57 - 1.00 mg/dL Final   • Sodium 03/24/2023 139  136 - 145 mmol/L Final   • Potassium 03/24/2023 3.8  3.5 - 5.2 mmol/L Final    Slight hemolysis detected by analyzer. Results may be affected.   • Chloride 03/24/2023 109 (H)  98 - 107 mmol/L Final   • CO2 03/24/2023 20.9 (L)  22.0 - 29.0 mmol/L Final   • Calcium 03/24/2023 9.2  8.6 - 10.5 mg/dL Final   • Total Protein 03/24/2023 6.9  6.0 - 8.5 g/dL Final   • Albumin 03/24/2023 4.0  3.5 - 5.2 g/dL Final   • ALT (SGPT) 03/24/2023 55 (H)  1 - 33 U/L Final   • AST (SGOT) 03/24/2023 42 (H)  1 - 32 U/L Final   • Alkaline Phosphatase 03/24/2023 63  39 - 117 U/L Final   • Total Bilirubin 03/24/2023 0.7  0.0 - 1.2 mg/dL Final   • Globulin 03/24/2023 2.9  gm/dL Final   • A/G Ratio 03/24/2023 1.4  g/dL Final   • BUN/Creatinine Ratio 03/24/2023 9.4  7.0 - 25.0 Final   • Anion Gap 03/24/2023 9.1  5.0 - 15.0 mmol/L Final   • eGFR 03/24/2023 114.2  >60.0 mL/min/1.73 Final   • Lipase 03/24/2023 30  13 - 60 U/L Final   • Color, UA 03/24/2023 Orange (A)  Yellow, Straw Final   • Appearance, UA 03/24/2023 Turbid (A)  Clear Final   • pH, UA 03/24/2023 7.0  5.0 - 8.0 Final   • Specific Gravity, UA 03/24/2023 1.019  1.005 - 1.030 Final   • Glucose, UA 03/24/2023 Negative  Negative Final   • Ketones, UA 03/24/2023 Negative  Negative Final   • Bilirubin, UA 03/24/2023 Small (1+) (A)  Negative Final   • Blood, UA 03/24/2023 Large (3+) (A)  Negative Final   • Protein, UA 03/24/2023 30 mg/dL (1+) (A)  Negative Final   • Leuk  Esterase, UA 03/24/2023 Small (1+) (A)  Negative Final   • Nitrite, UA 03/24/2023 Positive (A)  Negative Final   • Urobilinogen, UA 03/24/2023 1.0 E.U./dL  0.2 - 1.0 E.U./dL Final   • WBC 03/24/2023 5.96  3.40 - 10.80 10*3/mm3 Final   • RBC 03/24/2023 4.76  3.77 - 5.28 10*6/mm3 Final   • Hemoglobin 03/24/2023 15.0  12.0 - 15.9 g/dL Final   • Hematocrit 03/24/2023 44.8  34.0 - 46.6 % Final   • MCV 03/24/2023 94.1  79.0 - 97.0 fL Final   • MCH 03/24/2023 31.5  26.6 - 33.0 pg Final   • MCHC 03/24/2023 33.5  31.5 - 35.7 g/dL Final   • RDW 03/24/2023 13.6  12.3 - 15.4 % Final   • RDW-SD 03/24/2023 47.0  37.0 - 54.0 fl Final   • MPV 03/24/2023 10.2  6.0 - 12.0 fL Final   • Platelets 03/24/2023 165  140 - 450 10*3/mm3 Final   • Neutrophil % 03/24/2023 56.7  42.7 - 76.0 % Final   • Lymphocyte % 03/24/2023 34.6  19.6 - 45.3 % Final   • Monocyte % 03/24/2023 6.5  5.0 - 12.0 % Final   • Eosinophil % 03/24/2023 1.8  0.3 - 6.2 % Final   • Basophil % 03/24/2023 0.2  0.0 - 1.5 % Final   • Immature Grans % 03/24/2023 0.2  0.0 - 0.5 % Final   • Neutrophils, Absolute 03/24/2023 3.38  1.70 - 7.00 10*3/mm3 Final   • Lymphocytes, Absolute 03/24/2023 2.06  0.70 - 3.10 10*3/mm3 Final   • Monocytes, Absolute 03/24/2023 0.39  0.10 - 0.90 10*3/mm3 Final   • Eosinophils, Absolute 03/24/2023 0.11  0.00 - 0.40 10*3/mm3 Final   • Basophils, Absolute 03/24/2023 0.01  0.00 - 0.20 10*3/mm3 Final   • Immature Grans, Absolute 03/24/2023 0.01  0.00 - 0.05 10*3/mm3 Final   • nRBC 03/24/2023 0.0  0.0 - 0.2 /100 WBC Final   • Extra Tube 03/24/2023 Hold for add-ons.   Final    Auto resulted.   • Extra Tube 03/24/2023 hold for add-on   Final    Auto resulted   • Extra Tube 03/24/2023 Hold for add-ons.   Final    Auto resulted.   • Extra Tube 03/24/2023 Hold for add-ons.   Final    Auto resulted   • RBC, UA 03/24/2023 Too Numerous to Count (A)  None Seen, 0-2 /HPF Final   • WBC, UA 03/24/2023 6-12 (A)  None Seen, 0-2 /HPF Final   • Bacteria, UA 03/24/2023  1+ (A)  None Seen /HPF Final   • Squamous Epithelial Cells, UA 03/24/2023 13-20 (A)  None Seen, 0-2 /HPF Final   • Hyaline Casts, UA 03/24/2023 3-6  None Seen /LPF Final   • Methodology 03/24/2023 Automated Microscopy   Final   • Urine Culture 03/24/2023 No growth   Final   Admission on 03/16/2023, Discharged on 03/16/2023   Component Date Value Ref Range Status   • Glucose 03/16/2023 89  65 - 99 mg/dL Final   • BUN 03/16/2023 6  6 - 20 mg/dL Final   • Creatinine 03/16/2023 0.71  0.57 - 1.00 mg/dL Final   • Sodium 03/16/2023 140  136 - 145 mmol/L Final   • Potassium 03/16/2023 3.8  3.5 - 5.2 mmol/L Final   • Chloride 03/16/2023 107  98 - 107 mmol/L Final   • CO2 03/16/2023 21.9 (L)  22.0 - 29.0 mmol/L Final   • Calcium 03/16/2023 9.6  8.6 - 10.5 mg/dL Final   • Total Protein 03/16/2023 7.6  6.0 - 8.5 g/dL Final   • Albumin 03/16/2023 4.4  3.5 - 5.2 g/dL Final   • ALT (SGPT) 03/16/2023 52 (H)  1 - 33 U/L Final   • AST (SGOT) 03/16/2023 44 (H)  1 - 32 U/L Final   • Alkaline Phosphatase 03/16/2023 69  39 - 117 U/L Final   • Total Bilirubin 03/16/2023 0.9  0.0 - 1.2 mg/dL Final   • Globulin 03/16/2023 3.2  gm/dL Final   • A/G Ratio 03/16/2023 1.4  g/dL Final   • BUN/Creatinine Ratio 03/16/2023 8.5  7.0 - 25.0 Final   • Anion Gap 03/16/2023 11.1  5.0 - 15.0 mmol/L Final   • eGFR 03/16/2023 105.0  >60.0 mL/min/1.73 Final   • C-Reactive Protein 03/16/2023 <0.30  0.00 - 0.50 mg/dL Final   • Color, UA 03/16/2023 Orange (A)  Yellow, Straw Final   • Appearance, UA 03/16/2023 Cloudy (A)  Clear Final   • pH, UA 03/16/2023 7.0  5.0 - 8.0 Final   • Specific Gravity, UA 03/16/2023 1.011  1.005 - 1.030 Final   • Glucose, UA 03/16/2023 Negative  Negative Final   • Ketones, UA 03/16/2023 Negative  Negative Final   • Bilirubin, UA 03/16/2023 Negative  Negative Final   • Blood, UA 03/16/2023 Large (3+) (A)  Negative Final   • Protein, UA 03/16/2023 Trace (A)  Negative Final   • Leuk Esterase, UA 03/16/2023 Trace (A)  Negative Final   •  Nitrite, UA 03/16/2023 Negative  Negative Final   • Urobilinogen, UA 03/16/2023 1.0 E.U./dL  0.2 - 1.0 E.U./dL Final   • Extra Tube 03/16/2023 Hold for add-ons.   Final    Auto resulted.   • Extra Tube 03/16/2023 hold for add-on   Final    Auto resulted   • Extra Tube 03/16/2023 Hold for add-ons.   Final    Auto resulted.   • Extra Tube 03/16/2023 Hold for add-ons.   Final    Auto resulted   • WBC 03/16/2023 7.11  3.40 - 10.80 10*3/mm3 Final   • RBC 03/16/2023 5.24  3.77 - 5.28 10*6/mm3 Final   • Hemoglobin 03/16/2023 16.1 (H)  12.0 - 15.9 g/dL Final   • Hematocrit 03/16/2023 50.0 (H)  34.0 - 46.6 % Final   • MCV 03/16/2023 95.4  79.0 - 97.0 fL Final   • MCH 03/16/2023 30.7  26.6 - 33.0 pg Final   • MCHC 03/16/2023 32.2  31.5 - 35.7 g/dL Final   • RDW 03/16/2023 13.6  12.3 - 15.4 % Final   • RDW-SD 03/16/2023 48.4  37.0 - 54.0 fl Final   • MPV 03/16/2023 10.1  6.0 - 12.0 fL Final   • Platelets 03/16/2023 184  140 - 450 10*3/mm3 Final   • Neutrophil % 03/16/2023 62.9  42.7 - 76.0 % Final   • Lymphocyte % 03/16/2023 27.6  19.6 - 45.3 % Final   • Monocyte % 03/16/2023 7.2  5.0 - 12.0 % Final   • Eosinophil % 03/16/2023 1.7  0.3 - 6.2 % Final   • Basophil % 03/16/2023 0.3  0.0 - 1.5 % Final   • Immature Grans % 03/16/2023 0.3  0.0 - 0.5 % Final   • Neutrophils, Absolute 03/16/2023 4.48  1.70 - 7.00 10*3/mm3 Final   • Lymphocytes, Absolute 03/16/2023 1.96  0.70 - 3.10 10*3/mm3 Final   • Monocytes, Absolute 03/16/2023 0.51  0.10 - 0.90 10*3/mm3 Final   • Eosinophils, Absolute 03/16/2023 0.12  0.00 - 0.40 10*3/mm3 Final   • Basophils, Absolute 03/16/2023 0.02  0.00 - 0.20 10*3/mm3 Final   • Immature Grans, Absolute 03/16/2023 0.02  0.00 - 0.05 10*3/mm3 Final   • nRBC 03/16/2023 0.0  0.0 - 0.2 /100 WBC Final   • RBC, UA 03/16/2023 Too Numerous to Count (A)  None Seen, 0-2 /HPF Final   • WBC, UA 03/16/2023 3-5 (A)  None Seen, 0-2 /HPF Final    Urine culture not indicated.   • Bacteria, UA 03/16/2023 None Seen  None Seen /HPF  Final   • Squamous Epithelial Cells, UA 03/16/2023 0-2  None Seen, 0-2 /HPF Final   • Hyaline Casts, UA 03/16/2023 None Seen  None Seen /LPF Final   • Methodology 03/16/2023 Automated Microscopy   Final   Admission on 02/25/2023, Discharged on 02/25/2023   Component Date Value Ref Range Status   • Glucose 02/25/2023 91  65 - 99 mg/dL Final   • BUN 02/25/2023 7  6 - 20 mg/dL Final   • Creatinine 02/25/2023 0.67  0.57 - 1.00 mg/dL Final   • Sodium 02/25/2023 143  136 - 145 mmol/L Final   • Potassium 02/25/2023 4.3  3.5 - 5.2 mmol/L Final   • Chloride 02/25/2023 107  98 - 107 mmol/L Final   • CO2 02/25/2023 28.1  22.0 - 29.0 mmol/L Final   • Calcium 02/25/2023 9.6  8.6 - 10.5 mg/dL Final   • Total Protein 02/25/2023 7.0  6.0 - 8.5 g/dL Final   • Albumin 02/25/2023 4.3  3.5 - 5.2 g/dL Final   • ALT (SGPT) 02/25/2023 54 (H)  1 - 33 U/L Final   • AST (SGOT) 02/25/2023 44 (H)  1 - 32 U/L Final   • Alkaline Phosphatase 02/25/2023 72  39 - 117 U/L Final   • Total Bilirubin 02/25/2023 0.5  0.0 - 1.2 mg/dL Final   • Globulin 02/25/2023 2.7  gm/dL Final   • A/G Ratio 02/25/2023 1.6  g/dL Final   • BUN/Creatinine Ratio 02/25/2023 10.4  7.0 - 25.0 Final   • Anion Gap 02/25/2023 7.9  5.0 - 15.0 mmol/L Final   • eGFR 02/25/2023 108.0  >60.0 mL/min/1.73 Final   • Color, UA 02/25/2023 Red (A)  Yellow, Straw Final    Dipstick results may be inaccurate due to color interference.       • Appearance, UA 02/25/2023 Cloudy (A)  Clear Final   • pH, UA 02/25/2023 >=9.0 (H)  5.0 - 8.0 Final   • Specific Gravity, UA 02/25/2023 1.010  1.005 - 1.030 Final   • Glucose, UA 02/25/2023 Negative  Negative Final   • Ketones, UA 02/25/2023 Negative  Negative Final   • Bilirubin, UA 02/25/2023 Negative  Negative Final   • Blood, UA 02/25/2023 Large (3+) (A)  Negative Final   • Protein, UA 02/25/2023 Negative  Negative Final   • Leuk Esterase, UA 02/25/2023 Small (1+) (A)  Negative Final   • Nitrite, UA 02/25/2023 Negative  Negative Final   •  Urobilinogen, UA 02/25/2023 0.2 E.U./dL  0.2 - 1.0 E.U./dL Final   • C-Reactive Protein 02/25/2023 <0.30  0.00 - 0.50 mg/dL Final   • WBC 02/25/2023 4.97  3.40 - 10.80 10*3/mm3 Final   • RBC 02/25/2023 4.78  3.77 - 5.28 10*6/mm3 Final   • Hemoglobin 02/25/2023 14.5  12.0 - 15.9 g/dL Final   • Hematocrit 02/25/2023 44.1  34.0 - 46.6 % Final   • MCV 02/25/2023 92.3  79.0 - 97.0 fL Final   • MCH 02/25/2023 30.3  26.6 - 33.0 pg Final   • MCHC 02/25/2023 32.9  31.5 - 35.7 g/dL Final   • RDW 02/25/2023 14.0  12.3 - 15.4 % Final   • RDW-SD 02/25/2023 47.3  37.0 - 54.0 fl Final   • MPV 02/25/2023 10.7  6.0 - 12.0 fL Final   • Platelets 02/25/2023 154  140 - 450 10*3/mm3 Final   • Neutrophil % 02/25/2023 47.7  42.7 - 76.0 % Final   • Lymphocyte % 02/25/2023 41.9  19.6 - 45.3 % Final   • Monocyte % 02/25/2023 6.2  5.0 - 12.0 % Final   • Eosinophil % 02/25/2023 3.6  0.3 - 6.2 % Final   • Basophil % 02/25/2023 0.4  0.0 - 1.5 % Final   • Immature Grans % 02/25/2023 0.2  0.0 - 0.5 % Final   • Neutrophils, Absolute 02/25/2023 2.37  1.70 - 7.00 10*3/mm3 Final   • Lymphocytes, Absolute 02/25/2023 2.08  0.70 - 3.10 10*3/mm3 Final   • Monocytes, Absolute 02/25/2023 0.31  0.10 - 0.90 10*3/mm3 Final   • Eosinophils, Absolute 02/25/2023 0.18  0.00 - 0.40 10*3/mm3 Final   • Basophils, Absolute 02/25/2023 0.02  0.00 - 0.20 10*3/mm3 Final   • Immature Grans, Absolute 02/25/2023 0.01  0.00 - 0.05 10*3/mm3 Final   • nRBC 02/25/2023 0.0  0.0 - 0.2 /100 WBC Final   • RBC, UA 02/25/2023 Too Numerous to Count (A)  None Seen, 0-2 /HPF Final   • WBC, UA 02/25/2023 3-5 (A)  None Seen, 0-2 /HPF Final    Urine culture not indicated.   • Bacteria, UA 02/25/2023 None Seen  None Seen /HPF Final   • Squamous Epithelial Cells, UA 02/25/2023 0-2  None Seen, 0-2 /HPF Final   • Hyaline Casts, UA 02/25/2023 None Seen  None Seen /LPF Final   • Methodology 02/25/2023 Automated Microscopy   Final   Admission on 02/21/2023, Discharged on 02/21/2023   Component  Date Value Ref Range Status   • Glucose 02/21/2023 90  65 - 99 mg/dL Final   • BUN 02/21/2023 4 (L)  6 - 20 mg/dL Final   • Creatinine 02/21/2023 0.67  0.57 - 1.00 mg/dL Final   • Sodium 02/21/2023 141  136 - 145 mmol/L Final   • Potassium 02/21/2023 4.1  3.5 - 5.2 mmol/L Final    Slight hemolysis detected by analyzer. Results may be affected.   • Chloride 02/21/2023 108 (H)  98 - 107 mmol/L Final   • CO2 02/21/2023 23.3  22.0 - 29.0 mmol/L Final   • Calcium 02/21/2023 9.2  8.6 - 10.5 mg/dL Final   • Total Protein 02/21/2023 6.6  6.0 - 8.5 g/dL Final   • Albumin 02/21/2023 4.0  3.5 - 5.2 g/dL Final   • ALT (SGPT) 02/21/2023 56 (H)  1 - 33 U/L Final   • AST (SGOT) 02/21/2023 46 (H)  1 - 32 U/L Final   • Alkaline Phosphatase 02/21/2023 71  39 - 117 U/L Final   • Total Bilirubin 02/21/2023 0.5  0.0 - 1.2 mg/dL Final   • Globulin 02/21/2023 2.6  gm/dL Final   • A/G Ratio 02/21/2023 1.5  g/dL Final   • BUN/Creatinine Ratio 02/21/2023 6.0 (L)  7.0 - 25.0 Final   • Anion Gap 02/21/2023 9.7  5.0 - 15.0 mmol/L Final   • eGFR 02/21/2023 108.0  >60.0 mL/min/1.73 Final   • Lipase 02/21/2023 47  13 - 60 U/L Final   • C-Reactive Protein 02/21/2023 <0.30  0.00 - 0.50 mg/dL Final   • Color, UA 02/21/2023 Yellow  Yellow, Straw Final   • Appearance, UA 02/21/2023 Clear  Clear Final   • pH, UA 02/21/2023 7.0  5.0 - 8.0 Final   • Specific Gravity, UA 02/21/2023 1.006  1.005 - 1.030 Final   • Glucose, UA 02/21/2023 Negative  Negative Final   • Ketones, UA 02/21/2023 Negative  Negative Final   • Bilirubin, UA 02/21/2023 Negative  Negative Final   • Blood, UA 02/21/2023 Negative  Negative Final   • Protein, UA 02/21/2023 Negative  Negative Final   • Leuk Esterase, UA 02/21/2023 Negative  Negative Final   • Nitrite, UA 02/21/2023 Negative  Negative Final   • Urobilinogen, UA 02/21/2023 0.2 E.U./dL  0.2 - 1.0 E.U./dL Final   • Extra Tube 02/21/2023 Hold for add-ons.   Final    Auto resulted.   • Extra Tube 02/21/2023 hold for add-on   Final     Auto resulted   • Extra Tube 02/21/2023 Hold for add-ons.   Final    Auto resulted.   • Extra Tube 02/21/2023 Hold for add-ons.   Final    Auto resulted   • WBC 02/21/2023 5.14  3.40 - 10.80 10*3/mm3 Final   • RBC 02/21/2023 4.93  3.77 - 5.28 10*6/mm3 Final   • Hemoglobin 02/21/2023 15.3  12.0 - 15.9 g/dL Final   • Hematocrit 02/21/2023 46.6  34.0 - 46.6 % Final   • MCV 02/21/2023 94.5  79.0 - 97.0 fL Final   • MCH 02/21/2023 31.0  26.6 - 33.0 pg Final   • MCHC 02/21/2023 32.8  31.5 - 35.7 g/dL Final   • RDW 02/21/2023 13.8  12.3 - 15.4 % Final   • RDW-SD 02/21/2023 47.8  37.0 - 54.0 fl Final   • MPV 02/21/2023 10.2  6.0 - 12.0 fL Final   • Platelets 02/21/2023 143  140 - 450 10*3/mm3 Final   • Neutrophil % 02/21/2023 60.1  42.7 - 76.0 % Final   • Lymphocyte % 02/21/2023 31.7  19.6 - 45.3 % Final   • Monocyte % 02/21/2023 4.9 (L)  5.0 - 12.0 % Final   • Eosinophil % 02/21/2023 2.7  0.3 - 6.2 % Final   • Basophil % 02/21/2023 0.4  0.0 - 1.5 % Final   • Immature Grans % 02/21/2023 0.2  0.0 - 0.5 % Final   • Neutrophils, Absolute 02/21/2023 3.09  1.70 - 7.00 10*3/mm3 Final   • Lymphocytes, Absolute 02/21/2023 1.63  0.70 - 3.10 10*3/mm3 Final   • Monocytes, Absolute 02/21/2023 0.25  0.10 - 0.90 10*3/mm3 Final   • Eosinophils, Absolute 02/21/2023 0.14  0.00 - 0.40 10*3/mm3 Final   • Basophils, Absolute 02/21/2023 0.02  0.00 - 0.20 10*3/mm3 Final   • Immature Grans, Absolute 02/21/2023 0.01  0.00 - 0.05 10*3/mm3 Final   • nRBC 02/21/2023 0.0  0.0 - 0.2 /100 WBC Final   Admission on 02/09/2023, Discharged on 02/09/2023   Component Date Value Ref Range Status   • Color, UA 02/09/2023 Yellow  Yellow, Straw Final   • Appearance, UA 02/09/2023 Clear  Clear Final   • pH, UA 02/09/2023 6.5  5.0 - 8.0 Final   • Specific Gravity, UA 02/09/2023 1.008  1.005 - 1.030 Final   • Glucose, UA 02/09/2023 Negative  Negative Final   • Ketones, UA 02/09/2023 Negative  Negative Final   • Bilirubin, UA 02/09/2023 Negative  Negative Final    • Blood, UA 02/09/2023 Negative  Negative Final   • Protein, UA 02/09/2023 Negative  Negative Final   • Leuk Esterase, UA 02/09/2023 Negative  Negative Final   • Nitrite, UA 02/09/2023 Negative  Negative Final   • Urobilinogen, UA 02/09/2023 0.2 E.U./dL  0.2 - 1.0 E.U./dL Final   • Extra Tube 02/09/2023 Hold for add-ons.   Final    Auto resulted.   • Extra Tube 02/09/2023 hold for add-on   Final    Auto resulted   • Extra Tube 02/09/2023 Hold for add-ons.   Final    Auto resulted.   • Glucose 02/09/2023 98  65 - 99 mg/dL Final   • BUN 02/09/2023 4 (L)  6 - 20 mg/dL Final   • Creatinine 02/09/2023 0.53 (L)  0.57 - 1.00 mg/dL Final   • Sodium 02/09/2023 141  136 - 145 mmol/L Final   • Potassium 02/09/2023 4.0  3.5 - 5.2 mmol/L Final   • Chloride 02/09/2023 107  98 - 107 mmol/L Final   • CO2 02/09/2023 26.8  22.0 - 29.0 mmol/L Final   • Calcium 02/09/2023 8.5 (L)  8.6 - 10.5 mg/dL Final   • Total Protein 02/09/2023 6.1  6.0 - 8.5 g/dL Final   • Albumin 02/09/2023 3.7  3.5 - 5.2 g/dL Final   • ALT (SGPT) 02/09/2023 44 (H)  1 - 33 U/L Final   • AST (SGOT) 02/09/2023 37 (H)  1 - 32 U/L Final   • Alkaline Phosphatase 02/09/2023 66  39 - 117 U/L Final   • Total Bilirubin 02/09/2023 0.4  0.0 - 1.2 mg/dL Final   • Globulin 02/09/2023 2.4  gm/dL Final   • A/G Ratio 02/09/2023 1.5  g/dL Final   • BUN/Creatinine Ratio 02/09/2023 7.5  7.0 - 25.0 Final   • Anion Gap 02/09/2023 7.2  5.0 - 15.0 mmol/L Final   • eGFR 02/09/2023 114.2  >60.0 mL/min/1.73 Final   • WBC 02/09/2023 3.84  3.40 - 10.80 10*3/mm3 Final   • RBC 02/09/2023 4.29  3.77 - 5.28 10*6/mm3 Final   • Hemoglobin 02/09/2023 13.2  12.0 - 15.9 g/dL Final   • Hematocrit 02/09/2023 39.5  34.0 - 46.6 % Final   • MCV 02/09/2023 92.1  79.0 - 97.0 fL Final   • MCH 02/09/2023 30.8  26.6 - 33.0 pg Final   • MCHC 02/09/2023 33.4  31.5 - 35.7 g/dL Final   • RDW 02/09/2023 13.6  12.3 - 15.4 % Final   • RDW-SD 02/09/2023 45.9  37.0 - 54.0 fl Final   • MPV 02/09/2023 10.3  6.0 - 12.0  fL Final   • Platelets 02/09/2023 160  140 - 450 10*3/mm3 Final   • Neutrophil % 02/09/2023 50.5  42.7 - 76.0 % Final   • Lymphocyte % 02/09/2023 38.5  19.6 - 45.3 % Final   • Monocyte % 02/09/2023 6.8  5.0 - 12.0 % Final   • Eosinophil % 02/09/2023 3.6  0.3 - 6.2 % Final   • Basophil % 02/09/2023 0.3  0.0 - 1.5 % Final   • Immature Grans % 02/09/2023 0.3  0.0 - 0.5 % Final   • Neutrophils, Absolute 02/09/2023 1.94  1.70 - 7.00 10*3/mm3 Final   • Lymphocytes, Absolute 02/09/2023 1.48  0.70 - 3.10 10*3/mm3 Final   • Monocytes, Absolute 02/09/2023 0.26  0.10 - 0.90 10*3/mm3 Final   • Eosinophils, Absolute 02/09/2023 0.14  0.00 - 0.40 10*3/mm3 Final   • Basophils, Absolute 02/09/2023 0.01  0.00 - 0.20 10*3/mm3 Final   • Immature Grans, Absolute 02/09/2023 0.01  0.00 - 0.05 10*3/mm3 Final   • nRBC 02/09/2023 0.0  0.0 - 0.2 /100 WBC Final   Admission on 02/06/2023, Discharged on 02/06/2023   Component Date Value Ref Range Status   • Glucose 02/06/2023 91  65 - 99 mg/dL Final   • BUN 02/06/2023 5 (L)  6 - 20 mg/dL Final   • Creatinine 02/06/2023 0.57  0.57 - 1.00 mg/dL Final   • Sodium 02/06/2023 140  136 - 145 mmol/L Final   • Potassium 02/06/2023 4.0  3.5 - 5.2 mmol/L Final    Slight hemolysis detected by analyzer. Results may be affected.   • Chloride 02/06/2023 109 (H)  98 - 107 mmol/L Final   • CO2 02/06/2023 23.1  22.0 - 29.0 mmol/L Final   • Calcium 02/06/2023 8.9  8.6 - 10.5 mg/dL Final   • Total Protein 02/06/2023 6.6  6.0 - 8.5 g/dL Final   • Albumin 02/06/2023 4.0  3.5 - 5.2 g/dL Final   • ALT (SGPT) 02/06/2023 42 (H)  1 - 33 U/L Final   • AST (SGOT) 02/06/2023 35 (H)  1 - 32 U/L Final   • Alkaline Phosphatase 02/06/2023 66  39 - 117 U/L Final   • Total Bilirubin 02/06/2023 0.5  0.0 - 1.2 mg/dL Final   • Globulin 02/06/2023 2.6  gm/dL Final   • A/G Ratio 02/06/2023 1.5  g/dL Final   • BUN/Creatinine Ratio 02/06/2023 8.8  7.0 - 25.0 Final   • Anion Gap 02/06/2023 7.9  5.0 - 15.0 mmol/L Final   • eGFR 02/06/2023  112.3  >60.0 mL/min/1.73 Final   • WBC 02/06/2023 5.09  3.40 - 10.80 10*3/mm3 Final   • RBC 02/06/2023 4.79  3.77 - 5.28 10*6/mm3 Final   • Hemoglobin 02/06/2023 14.5  12.0 - 15.9 g/dL Final   • Hematocrit 02/06/2023 45.7  34.0 - 46.6 % Final   • MCV 02/06/2023 95.4  79.0 - 97.0 fL Final   • MCH 02/06/2023 30.3  26.6 - 33.0 pg Final   • MCHC 02/06/2023 31.7  31.5 - 35.7 g/dL Final   • RDW 02/06/2023 13.6  12.3 - 15.4 % Final   • RDW-SD 02/06/2023 48.4  37.0 - 54.0 fl Final   • MPV 02/06/2023 10.2  6.0 - 12.0 fL Final   • Platelets 02/06/2023 158  140 - 450 10*3/mm3 Final   • Neutrophil % 02/06/2023 54.2  42.7 - 76.0 % Final   • Lymphocyte % 02/06/2023 36.9  19.6 - 45.3 % Final   • Monocyte % 02/06/2023 5.7  5.0 - 12.0 % Final   • Eosinophil % 02/06/2023 2.6  0.3 - 6.2 % Final   • Basophil % 02/06/2023 0.4  0.0 - 1.5 % Final   • Immature Grans % 02/06/2023 0.2  0.0 - 0.5 % Final   • Neutrophils, Absolute 02/06/2023 2.76  1.70 - 7.00 10*3/mm3 Final   • Lymphocytes, Absolute 02/06/2023 1.88  0.70 - 3.10 10*3/mm3 Final   • Monocytes, Absolute 02/06/2023 0.29  0.10 - 0.90 10*3/mm3 Final   • Eosinophils, Absolute 02/06/2023 0.13  0.00 - 0.40 10*3/mm3 Final   • Basophils, Absolute 02/06/2023 0.02  0.00 - 0.20 10*3/mm3 Final   • Immature Grans, Absolute 02/06/2023 0.01  0.00 - 0.05 10*3/mm3 Final   • nRBC 02/06/2023 0.0  0.0 - 0.2 /100 WBC Final   • Extra Tube 02/06/2023 Hold for add-ons.   Final    Auto resulted.   • Extra Tube 02/06/2023 hold for add-on   Final    Auto resulted        Procedure:   Urethral dilation-after an appropriate informed consent, the patient was brought to the procedure suite.  The urethra was gently anesthetized with 10 mL of 2% viscous Xylocaine jelly.  After an adequate period of topical anesthesia I went ahead and advancedand dilated with [place] sounds from 16 to 26French sequentially without complication. The patient was given gentamicin as prophylaxis with 80 mg.  Assessment/Plan:    Urethral stricture-posttraumatic status post successful dilation  Narcotic pain medication-patient has significant acute pain that I believe would be an indication for the use of narcotic pain medication.  I discussed the significant risks of pain medication and the fact that this will be a short only option and I will give her no more than a three-day supply of pain medication, I will not plan long-term medication, and that this will be sent to a pain clinic if it at all becomes necessary.  We discussed signing a pain medication agreement and the fact that we're going to run a state [place] review to be sure the patient is not getting pain medication from elsewhere.  If this is the case, we will not give pain medication as part of the patient's treatment plan of there being prescribed a controlled substance with potential for abuse.  This patient has been well aware of the appropriate dose of such medications including the risks for somnolence, limited ability to drive and/or safety and the significant potential for overdose.  It has been made clear that these medications are for the prescribed patient only without concomitant use of alcohol or other substance unless prescribed by the medical provider.  Has completed prescribing agreement detailing the terms of continue prescribing him a controlled substance including monitoring [place] [facility] reports, the possibility of urine drug screens, and pill counts.  The patient is aware that we review [facility]ERT[facility] reports on a regular basis and scan them into the chart.  History and physical examination exhibited continued safe and appropriate use of controlled substances. We also discussed the fact that the new [place] legislation allows only a three-day prescription for pain medication.  In this situation he will be referred to a chronic pain clinic.        This document has been electronically signed by VERENICE FINK MD April 25, 2023 14:46 EDT    Dictated Utilizing Dragon  Dictation: Part of this note may be an electronic transcription/translation of spoken language to printed text using the Dragon Dictation System.

## 2023-05-23 ENCOUNTER — OFFICE VISIT (OUTPATIENT)
Dept: UROLOGY | Facility: CLINIC | Age: 48
End: 2023-05-23

## 2023-05-23 VITALS
BODY MASS INDEX: 15.76 KG/M2 | DIASTOLIC BLOOD PRESSURE: 85 MMHG | HEART RATE: 96 BPM | HEIGHT: 68 IN | WEIGHT: 104 LBS | SYSTOLIC BLOOD PRESSURE: 129 MMHG

## 2023-05-23 DIAGNOSIS — N35.028 OTHER POST-TRAUMATIC URETHRAL STRICTURE, FEMALE: Primary | ICD-10-CM

## 2023-05-23 DIAGNOSIS — N23 RENAL COLIC: ICD-10-CM

## 2023-05-23 RX ORDER — PROMETHAZINE HYDROCHLORIDE 25 MG/1
25 TABLET ORAL EVERY 6 HOURS PRN
Qty: 15 TABLET | Refills: 0 | Status: SHIPPED | OUTPATIENT
Start: 2023-05-23

## 2023-05-23 RX ORDER — OXYCODONE AND ACETAMINOPHEN 10; 325 MG/1; MG/1
1 TABLET ORAL EVERY 6 HOURS PRN
Qty: 20 TABLET | Refills: 0 | Status: SHIPPED | OUTPATIENT
Start: 2023-05-23

## 2023-05-23 RX ORDER — GENTAMICIN SULFATE 40 MG/ML
80 INJECTION, SOLUTION INTRAMUSCULAR; INTRAVENOUS ONCE
Status: COMPLETED | OUTPATIENT
Start: 2023-05-23 | End: 2023-05-23

## 2023-05-23 RX ORDER — OXYCODONE HYDROCHLORIDE AND ACETAMINOPHEN 5; 325 MG/1; MG/1
1 TABLET ORAL EVERY 8 HOURS PRN
Qty: 12 TABLET | Refills: 0 | Status: SHIPPED | OUTPATIENT
Start: 2023-05-23

## 2023-05-23 RX ADMIN — GENTAMICIN SULFATE 80 MG: 40 INJECTION, SOLUTION INTRAMUSCULAR; INTRAVENOUS at 09:21

## 2023-05-23 NOTE — PROGRESS NOTES
Chief Complaint:      Chief Complaint   Patient presents with   • Other post-traumatic urethral stricture, female       HPI:   48 y.o. female returns today for dilation    Past Medical History:     Past Medical History:   Diagnosis Date   • Abdominal pain    • Abdominal swelling    • Hoyos's disease    • Bipolar 1 disorder    • Brain tumor     R Frontal Lobe per pt   • Brain tumor 2014   • Brain tumor (benign)    • Constipation    • DDD (degenerative disc disease), cervical 05/29/2017   • DDD (degenerative disc disease), cervical    • Diarrhea    • Fibromyalgia    • IBS (irritable bowel syndrome)    • Migraine    • Nausea & vomiting    • PONV (postoperative nausea and vomiting)    • PTSD (post-traumatic stress disorder)    • Rectal bleeding        Current Meds:     Current Outpatient Medications   Medication Sig Dispense Refill   • albuterol (PROVENTIL HFA;VENTOLIN HFA) 108 (90 Base) MCG/ACT inhaler Inhale 2 puffs 2 (Two) Times a Day.     • Azelastine HCl 137 MCG/SPRAY solution 1 spray into the nostril(s) as directed by provider As Needed (Allergies).     • busPIRone (BUSPAR) 15 MG tablet Take 1 tablet by mouth 3 (Three) Times a Day.     • diclofenac (VOLTAREN) 1 % gel gel      • divalproex (DEPAKOTE) 500 MG DR tablet Take 1 tablet by mouth 3 (Three) Times a Day. 90 tablet 2   • docusate sodium (COLACE) 100 MG capsule Take 1 capsule by mouth 2 (Two) Times a Day. 20 capsule 0   • docusate sodium (COLACE) 100 MG capsule Take 1 capsule by mouth 2 (Two) Times a Day As Needed for Constipation. 60 capsule 0   • fluticasone (VERAMYST) 27.5 MCG/SPRAY nasal spray 2 sprays into the nostril(s) as directed by provider Daily.     • megestrol (MEGACE) 20 MG tablet Take 1 tablet by mouth Daily. 30 tablet 3   • methylPREDNISolone (MEDROL) 4 MG dose pack Take as directed on package instructions. 21 tablet 0   • metroNIDAZOLE (FLAGYL) 500 MG tablet Take 1 tablet by mouth 3 (Three) Times a Day. 30 tablet 0   • montelukast (SINGULAIR)  10 MG tablet Take 1 tablet by mouth Every Night.     • ondansetron ODT (ZOFRAN-ODT) 4 MG disintegrating tablet Place 2 tablets on the tongue Every 8 (Eight) Hours As Needed for Nausea or Vomiting. 30 tablet 0   • oxyCODONE-acetaminophen (Percocet)  MG per tablet Take 1 tablet by mouth Every 6 (Six) Hours As Needed for Moderate Pain. 20 tablet 0   • oxyCODONE-acetaminophen (PERCOCET) 5-325 MG per tablet Take 1 tablet by mouth Every 8 (Eight) Hours As Needed for Moderate Pain . 12 tablet 0   • pantoprazole (PROTONIX) 40 MG EC tablet Take 1 tablet by mouth 2 (Two) Times a Day As Needed.     • phenazopyridine (PYRIDIUM) 100 MG tablet Take 1 tablet by mouth 3 (Three) Times a Day As Needed for Bladder Spasms. 30 tablet 1   • polyethylene glycol (MIRALAX) 17 GM/SCOOP powder Take 17 g by mouth Daily. 225 g 0   • promethazine (PHENERGAN) 25 MG tablet Take 1 tablet by mouth Every 6 (Six) Hours As Needed for Nausea or Vomiting. 15 tablet 0   • promethazine (PHENERGAN) 25 MG tablet Take 1 tablet by mouth Every 6 (Six) Hours As Needed for Nausea or Vomiting. 21 tablet 5   • sertraline (ZOLOFT) 100 MG tablet Take 1 tablet by mouth 2 (Two) Times a Day.     • SUMAtriptan (IMITREX) 6 MG/0.5ML injection Inject 6 mg under the skin 1 (One) Time.     • traMADol (ULTRAM) 50 MG tablet      • terazosin (HYTRIN) 2 MG capsule Take 1 capsule by mouth Every Night for 14 days. 14 capsule 0     No current facility-administered medications for this visit.        Allergies:      Allergies   Allergen Reactions   • Ativan [Lorazepam] Hallucinations     confusion   • Sulfa Antibiotics Shortness Of Breath and Swelling   • Sulfa Antibiotics Anaphylaxis   • Reglan [Metoclopramide] Angioedema   • Compazine [Prochlorperazine Edisylate] Hives   • Demerol [Meperidine] Hives   • Droperidol Itching   • Metoclopramide Swelling   • Toradol [Ketorolac Tromethamine] Hives and Itching   • Toradol [Ketorolac Tromethamine] Hives   • Zofran [Ondansetron Hcl]  Rash   • Zosyn [Piperacillin Sod-Tazobactam So] Hives        Past Surgical History:     Past Surgical History:   Procedure Laterality Date   • ANAL SCOPE N/A 2016    Procedure: ANAL SCOPE;  Surgeon: Kael Lopez MD;  Location: [facility] OR;  Service:    • APPENDECTOMY     • COLONOSCOPY N/A 2016    Procedure: COLONOSCOPY  CPTCODE:31583;  Surgeon: Jose Antonio Belle III, MD;  Location: [facility] OR;  Service:    • COLONOSCOPY N/A 2016    Procedure: COLONOSCOPY (69472) CPT;  Surgeon: Jose Antonio Belle III, MD;  Location: [facility] OR;  Service:    • CYSTOSCOPY RETROGRADE PYELOGRAM Bilateral 2017    Procedure: CYSTOSCOPY RETROGRADE PYELOGRAM;  Surgeon: Mu Blunt MD;  Location: [facility] OR;  Service:    • ENDOSCOPY N/A 2016    Procedure: ESOPHAGOGASTRODUODENOSCOPY WITH BIOPSY  CPTCODE:60532;  Surgeon: Jose Antonio Belle III, MD;  Location: [facility] OR;  Service:    • HEMORRHOIDECTOMY N/A 2016    Procedure: HEMORRHOID STAPLING;  Surgeon: Kael Lopez MD;  Location: [facility] OR;  Service:    • HYSTERECTOMY     • KNEE SURGERY     • LAPAROSCOPIC SALPINGOOPHERECTOMY     • PORTACATH PLACEMENT N/A 2017    Procedure: INSERTION OF PORTACATH;  Surgeon: Celso Arredondo MD;  Location: [facility] OR;  Service:    • SHOULDER SURGERY      3 times       Social History:     Social History     Socioeconomic History   • Marital status:    Tobacco Use   • Smoking status: Former     Packs/day: 1.00     Years: 20.00     Pack years: 20.00     Types: Cigarettes     Quit date: 2015     Years since quittin.5   • Smokeless tobacco: Never   Vaping Use   • Vaping Use: Never used   Substance and Sexual Activity   • Alcohol use: No   • Drug use: Yes     Types: Marijuana     Comment: for pain   • Sexual activity: Defer     Birth control/protection: Surgical       Family History:     Family History   Problem Relation Age of Onset   • Crohn's disease Other    • Hypertension Other    • Diabetes  Other    • Irritable bowel syndrome Other    • No Known Problems Father    • No Known Problems Mother        Review of Systems:     Review of Systems   Constitutional: Negative.  Negative for activity change, appetite change, chills, diaphoresis, fatigue and unexpected weight change.   HENT: Negative for congestion, dental problem, drooling, ear discharge, ear pain, facial swelling, hearing loss, mouth sores, nosebleeds, postnasal drip, rhinorrhea, sinus pressure, sneezing, sore throat, tinnitus, trouble swallowing and voice change.    Eyes: Negative.  Negative for photophobia, pain, discharge, redness, itching and visual disturbance.   Respiratory: Negative.  Negative for apnea, cough, choking, chest tightness, shortness of breath, wheezing and stridor.    Cardiovascular: Negative.  Negative for chest pain, palpitations and leg swelling.   Gastrointestinal: Negative.  Negative for abdominal distention, abdominal pain, anal bleeding, blood in stool, constipation, diarrhea, nausea, rectal pain and vomiting.   Endocrine: Negative.  Negative for cold intolerance, heat intolerance, polydipsia, polyphagia and polyuria.   Genitourinary: Positive for difficulty urinating.   Musculoskeletal: Negative.  Negative for arthralgias, back pain, gait problem, joint swelling, myalgias, neck pain and neck stiffness.   Skin: Negative.  Negative for color change, pallor, rash and wound.   Allergic/Immunologic: Negative.  Negative for environmental allergies, food allergies and immunocompromised state.   Neurological: Negative.  Negative for dizziness, tremors, seizures, syncope, facial asymmetry, speech difficulty, weakness, light-headedness, numbness and headaches.   Hematological: Negative.  Negative for adenopathy. Does not bruise/bleed easily.   Psychiatric/Behavioral: Negative for agitation, behavioral problems, confusion, decreased concentration, dysphoric mood, hallucinations, self-injury, sleep disturbance and suicidal ideas.  The patient is not nervous/anxious and is not hyperactive.    All other systems reviewed and are negative.      Physical Exam:     Physical Exam  Constitutional:       Appearance: She is well-developed.   HENT:      Head: Normocephalic and atraumatic.      Right Ear: External ear normal.      Left Ear: External ear normal.   Eyes:      Conjunctiva/sclera: Conjunctivae normal.      Pupils: Pupils are equal, round, and reactive to light.   Cardiovascular:      Rate and Rhythm: Normal rate and regular rhythm.      Heart sounds: Normal heart sounds.   Pulmonary:      Effort: Pulmonary effort is normal.      Breath sounds: Normal breath sounds.   Abdominal:      General: Bowel sounds are normal. There is no distension.      Palpations: Abdomen is soft. There is no mass.      Tenderness: There is no abdominal tenderness. There is no guarding or rebound.   Genitourinary:     General: Normal vulva.      Vagina: No vaginal discharge.   Musculoskeletal:         General: Normal range of motion.   Skin:     General: Skin is warm and dry.   Neurological:      Mental Status: She is alert.      Deep Tendon Reflexes: Reflexes are normal and symmetric.   Psychiatric:         Behavior: Behavior normal.         Thought Content: Thought content normal.         Judgment: Judgment normal.         I have reviewed the following portions of the patient's history: Allergies, current medications, past family history, past medical history, past social history, past surgical history, problem list, and ROS and confirm it is accurate.    Recent Image (CT and/or KUB):      CT Abdomen and Pelvis: No results found for this or any previous visit.       CT Stone Protocol: Results for orders placed during the hospital encounter of 03/16/23    CT Abdomen Pelvis Stone Protocol    Narrative  CT ABDOMEN AND PELVIS, NONCONTRAST, 3/16/2023    HISTORY:  48-year-old female in the ED with lower abdomen pain, hematuria.    TECHNIQUE:  CT imaging of the abdomen  and pelvis ordered as noncontrast kidney stone protocol exam. Radiation dose reduction techniques included automated exposure control. Radiation audit for CT and nuclear cardiology exams in the last 12 months: 0.    COMPARISON:  *  CT abdomen/pelvis, 10/3/2022.    ABDOMEN FINDINGS:  Both kidneys, both ureters and urinary bladder are normal in noncontrast CT appearance. No visible radiopaque renal or urinary tract stone material. No evidence of upper urinary tract obstruction.    No gallbladder distention or bile duct dilatation. Liver, pancreas and spleen are normal in size and appearance without contrast. Normal caliber abdominal aorta.    Small bowel and colon are normal in caliber and appearance, as imaged. The appendix is not directly visualized, but there is no indirect CT evidence of acute appendicitis or other active inflammation in the ileocecal region. No gastric distention, hiatal  hernia or distal esophageal dilatation.    PELVIS FINDINGS:  Hysterectomy. Urinary bladder and rectum are within normal limits. No free pelvic fluid. No inguinal hernia.    Lung base images show no active disease. There is some cylindrical bronchiectasis involving the posterior segment right lower lobe. Fluid opacification of one of the bronchiectatic segments caused a nodular appearance on prior imaging, but this has  resolved today. There is air trapping within this segment.    Mild chronic superior endplate vertebral compression fracture deformity is incidentally noted at L2, unchanged.    Impression  1. No acute abnormality within the abdomen or pelvis.  2. No visible nephrolithiasis or evidence of urinary obstruction.  3. Mild chronic bronchiectasis in the posterior right lower lobe.  4. Mild chronic fracture deformity of the L2 vertebral body.    Signer Name: Sang Painting MD  Signed: 3/16/2023 6:02 PM  Workstation Name: [email or web address]  [facility]       KUB: Results for orders placed during the  hospital encounter of 01/10/23    XR Abdomen KUB    Narrative  EXAM:  XR Abdomen, 1 View    EXAM DATE:  1/10/2023 12:05 PM    CLINICAL HISTORY:  flank pain    TECHNIQUE:  Frontal supine view of the abdomen/pelvis.    COMPARISON:  10/01/2022    FINDINGS:  Gastrointestinal tract:  Unremarkable.  No dilation.  Organs:  No stones identified along the expected course of ureters.  Bones/joints:  Unremarkable.  Soft tissues:  Surgical staples in the lower pelvis.  Vasculature:  Stable phleboliths lower pelvis.    Impression  1.  No acute findings radiographically evident.  2.  No radiographic evidence of renal or ureteral stones.    This report was finalized on 1/10/2023 12:47 PM by Dr. Ankit Naik MD.       Labs (past 3 months):      Admission on 04/19/2023, Discharged on 04/19/2023   Component Date Value Ref Range Status   • Color, UA 04/19/2023 Red (A)  Yellow, Straw Final   • Appearance, UA 04/19/2023 Cloudy (A)  Clear Final   • pH, UA 04/19/2023 6.0  5.0 - 8.0 Final   • Specific Gravity, UA 04/19/2023 1.012  1.005 - 1.030 Final   • Glucose, UA 04/19/2023 Negative  Negative Final   • Ketones, UA 04/19/2023 Negative  Negative Final   • Bilirubin, UA 04/19/2023 Negative  Negative Final   • Blood, UA 04/19/2023 Large (3+) (A)  Negative Final   • Protein, UA 04/19/2023 30 mg/dL (1+) (A)  Negative Final   • Leuk Esterase, UA 04/19/2023 Small (1+) (A)  Negative Final   • Nitrite, UA 04/19/2023 Negative  Negative Final   • Urobilinogen, UA 04/19/2023 0.2 E.U./dL  0.2 - 1.0 E.U./dL Final   • RBC, UA 04/19/2023 Too Numerous to Count (A)  None Seen, 0-2 /HPF Final   • WBC, UA 04/19/2023 3-5 (A)  None Seen, 0-2 /HPF Final   • Bacteria, UA 04/19/2023 None Seen  None Seen /HPF Final   • Squamous Epithelial Cells, UA 04/19/2023 0-2  None Seen, 0-2 /HPF Final   • Hyaline Casts, UA 04/19/2023 3-6  None Seen /LPF Final   • Methodology 04/19/2023 Automated Microscopy   Final   Admission on 04/11/2023, Discharged on 04/11/2023    Component Date Value Ref Range Status   • QT Interval 04/11/2023 428  ms Final   • QTC Interval 04/11/2023 441  ms Final   Admission on 03/24/2023, Discharged on 03/24/2023   Component Date Value Ref Range Status   • Glucose 03/24/2023 90  65 - 99 mg/dL Final   • BUN 03/24/2023 5 (L)  6 - 20 mg/dL Final   • Creatinine 03/24/2023 0.53 (L)  0.57 - 1.00 mg/dL Final   • Sodium 03/24/2023 139  136 - 145 mmol/L Final   • Potassium 03/24/2023 3.8  3.5 - 5.2 mmol/L Final    Slight hemolysis detected by analyzer. Results may be affected.   • Chloride 03/24/2023 109 (H)  98 - 107 mmol/L Final   • CO2 03/24/2023 20.9 (L)  22.0 - 29.0 mmol/L Final   • Calcium 03/24/2023 9.2  8.6 - 10.5 mg/dL Final   • Total Protein 03/24/2023 6.9  6.0 - 8.5 g/dL Final   • Albumin 03/24/2023 4.0  3.5 - 5.2 g/dL Final   • ALT (SGPT) 03/24/2023 55 (H)  1 - 33 U/L Final   • AST (SGOT) 03/24/2023 42 (H)  1 - 32 U/L Final   • Alkaline Phosphatase 03/24/2023 63  39 - 117 U/L Final   • Total Bilirubin 03/24/2023 0.7  0.0 - 1.2 mg/dL Final   • Globulin 03/24/2023 2.9  gm/dL Final   • A/G Ratio 03/24/2023 1.4  g/dL Final   • BUN/Creatinine Ratio 03/24/2023 9.4  7.0 - 25.0 Final   • Anion Gap 03/24/2023 9.1  5.0 - 15.0 mmol/L Final   • eGFR 03/24/2023 114.2  >60.0 mL/min/1.73 Final   • Lipase 03/24/2023 30  13 - 60 U/L Final   • Color, UA 03/24/2023 Orange (A)  Yellow, Straw Final   • Appearance, UA 03/24/2023 Turbid (A)  Clear Final   • pH, UA 03/24/2023 7.0  5.0 - 8.0 Final   • Specific Gravity, UA 03/24/2023 1.019  1.005 - 1.030 Final   • Glucose, UA 03/24/2023 Negative  Negative Final   • Ketones, UA 03/24/2023 Negative  Negative Final   • Bilirubin, UA 03/24/2023 Small (1+) (A)  Negative Final   • Blood, UA 03/24/2023 Large (3+) (A)  Negative Final   • Protein, UA 03/24/2023 30 mg/dL (1+) (A)  Negative Final   • Leuk Esterase, UA 03/24/2023 Small (1+) (A)  Negative Final   • Nitrite, UA 03/24/2023 Positive (A)  Negative Final   • Urobilinogen, UA  03/24/2023 1.0 E.U./dL  0.2 - 1.0 E.U./dL Final   • WBC 03/24/2023 5.96  3.40 - 10.80 10*3/mm3 Final   • RBC 03/24/2023 4.76  3.77 - 5.28 10*6/mm3 Final   • Hemoglobin 03/24/2023 15.0  12.0 - 15.9 g/dL Final   • Hematocrit 03/24/2023 44.8  34.0 - 46.6 % Final   • MCV 03/24/2023 94.1  79.0 - 97.0 fL Final   • MCH 03/24/2023 31.5  26.6 - 33.0 pg Final   • MCHC 03/24/2023 33.5  31.5 - 35.7 g/dL Final   • RDW 03/24/2023 13.6  12.3 - 15.4 % Final   • RDW-SD 03/24/2023 47.0  37.0 - 54.0 fl Final   • MPV 03/24/2023 10.2  6.0 - 12.0 fL Final   • Platelets 03/24/2023 165  140 - 450 10*3/mm3 Final   • Neutrophil % 03/24/2023 56.7  42.7 - 76.0 % Final   • Lymphocyte % 03/24/2023 34.6  19.6 - 45.3 % Final   • Monocyte % 03/24/2023 6.5  5.0 - 12.0 % Final   • Eosinophil % 03/24/2023 1.8  0.3 - 6.2 % Final   • Basophil % 03/24/2023 0.2  0.0 - 1.5 % Final   • Immature Grans % 03/24/2023 0.2  0.0 - 0.5 % Final   • Neutrophils, Absolute 03/24/2023 3.38  1.70 - 7.00 10*3/mm3 Final   • Lymphocytes, Absolute 03/24/2023 2.06  0.70 - 3.10 10*3/mm3 Final   • Monocytes, Absolute 03/24/2023 0.39  0.10 - 0.90 10*3/mm3 Final   • Eosinophils, Absolute 03/24/2023 0.11  0.00 - 0.40 10*3/mm3 Final   • Basophils, Absolute 03/24/2023 0.01  0.00 - 0.20 10*3/mm3 Final   • Immature Grans, Absolute 03/24/2023 0.01  0.00 - 0.05 10*3/mm3 Final   • nRBC 03/24/2023 0.0  0.0 - 0.2 /100 WBC Final   • Extra Tube 03/24/2023 Hold for add-ons.   Final    Auto resulted.   • Extra Tube 03/24/2023 hold for add-on   Final    Auto resulted   • Extra Tube 03/24/2023 Hold for add-ons.   Final    Auto resulted.   • Extra Tube 03/24/2023 Hold for add-ons.   Final    Auto resulted   • RBC, UA 03/24/2023 Too Numerous to Count (A)  None Seen, 0-2 /HPF Final   • WBC, UA 03/24/2023 6-12 (A)  None Seen, 0-2 /HPF Final   • Bacteria, UA 03/24/2023 1+ (A)  None Seen /HPF Final   • Squamous Epithelial Cells, UA 03/24/2023 13-20 (A)  None Seen, 0-2 /HPF Final   • Hyaline Casts, UA  03/24/2023 3-6  None Seen /LPF Final   • Methodology 03/24/2023 Automated Microscopy   Final   • Urine Culture 03/24/2023 No growth   Final   Admission on 03/16/2023, Discharged on 03/16/2023   Component Date Value Ref Range Status   • Glucose 03/16/2023 89  65 - 99 mg/dL Final   • BUN 03/16/2023 6  6 - 20 mg/dL Final   • Creatinine 03/16/2023 0.71  0.57 - 1.00 mg/dL Final   • Sodium 03/16/2023 140  136 - 145 mmol/L Final   • Potassium 03/16/2023 3.8  3.5 - 5.2 mmol/L Final   • Chloride 03/16/2023 107  98 - 107 mmol/L Final   • CO2 03/16/2023 21.9 (L)  22.0 - 29.0 mmol/L Final   • Calcium 03/16/2023 9.6  8.6 - 10.5 mg/dL Final   • Total Protein 03/16/2023 7.6  6.0 - 8.5 g/dL Final   • Albumin 03/16/2023 4.4  3.5 - 5.2 g/dL Final   • ALT (SGPT) 03/16/2023 52 (H)  1 - 33 U/L Final   • AST (SGOT) 03/16/2023 44 (H)  1 - 32 U/L Final   • Alkaline Phosphatase 03/16/2023 69  39 - 117 U/L Final   • Total Bilirubin 03/16/2023 0.9  0.0 - 1.2 mg/dL Final   • Globulin 03/16/2023 3.2  gm/dL Final   • A/G Ratio 03/16/2023 1.4  g/dL Final   • BUN/Creatinine Ratio 03/16/2023 8.5  7.0 - 25.0 Final   • Anion Gap 03/16/2023 11.1  5.0 - 15.0 mmol/L Final   • eGFR 03/16/2023 105.0  >60.0 mL/min/1.73 Final   • C-Reactive Protein 03/16/2023 <0.30  0.00 - 0.50 mg/dL Final   • Color, UA 03/16/2023 Orange (A)  Yellow, Straw Final   • Appearance, UA 03/16/2023 Cloudy (A)  Clear Final   • pH, UA 03/16/2023 7.0  5.0 - 8.0 Final   • Specific Gravity, UA 03/16/2023 1.011  1.005 - 1.030 Final   • Glucose, UA 03/16/2023 Negative  Negative Final   • Ketones, UA 03/16/2023 Negative  Negative Final   • Bilirubin, UA 03/16/2023 Negative  Negative Final   • Blood, UA 03/16/2023 Large (3+) (A)  Negative Final   • Protein, UA 03/16/2023 Trace (A)  Negative Final   • Leuk Esterase, UA 03/16/2023 Trace (A)  Negative Final   • Nitrite, UA 03/16/2023 Negative  Negative Final   • Urobilinogen, UA 03/16/2023 1.0 E.U./dL  0.2 - 1.0 E.U./dL Final   • Extra Tube  03/16/2023 Hold for add-ons.   Final    Auto resulted.   • Extra Tube 03/16/2023 hold for add-on   Final    Auto resulted   • Extra Tube 03/16/2023 Hold for add-ons.   Final    Auto resulted.   • Extra Tube 03/16/2023 Hold for add-ons.   Final    Auto resulted   • WBC 03/16/2023 7.11  3.40 - 10.80 10*3/mm3 Final   • RBC 03/16/2023 5.24  3.77 - 5.28 10*6/mm3 Final   • Hemoglobin 03/16/2023 16.1 (H)  12.0 - 15.9 g/dL Final   • Hematocrit 03/16/2023 50.0 (H)  34.0 - 46.6 % Final   • MCV 03/16/2023 95.4  79.0 - 97.0 fL Final   • MCH 03/16/2023 30.7  26.6 - 33.0 pg Final   • MCHC 03/16/2023 32.2  31.5 - 35.7 g/dL Final   • RDW 03/16/2023 13.6  12.3 - 15.4 % Final   • RDW-SD 03/16/2023 48.4  37.0 - 54.0 fl Final   • MPV 03/16/2023 10.1  6.0 - 12.0 fL Final   • Platelets 03/16/2023 184  140 - 450 10*3/mm3 Final   • Neutrophil % 03/16/2023 62.9  42.7 - 76.0 % Final   • Lymphocyte % 03/16/2023 27.6  19.6 - 45.3 % Final   • Monocyte % 03/16/2023 7.2  5.0 - 12.0 % Final   • Eosinophil % 03/16/2023 1.7  0.3 - 6.2 % Final   • Basophil % 03/16/2023 0.3  0.0 - 1.5 % Final   • Immature Grans % 03/16/2023 0.3  0.0 - 0.5 % Final   • Neutrophils, Absolute 03/16/2023 4.48  1.70 - 7.00 10*3/mm3 Final   • Lymphocytes, Absolute 03/16/2023 1.96  0.70 - 3.10 10*3/mm3 Final   • Monocytes, Absolute 03/16/2023 0.51  0.10 - 0.90 10*3/mm3 Final   • Eosinophils, Absolute 03/16/2023 0.12  0.00 - 0.40 10*3/mm3 Final   • Basophils, Absolute 03/16/2023 0.02  0.00 - 0.20 10*3/mm3 Final   • Immature Grans, Absolute 03/16/2023 0.02  0.00 - 0.05 10*3/mm3 Final   • nRBC 03/16/2023 0.0  0.0 - 0.2 /100 WBC Final   • RBC, UA 03/16/2023 Too Numerous to Count (A)  None Seen, 0-2 /HPF Final   • WBC, UA 03/16/2023 3-5 (A)  None Seen, 0-2 /HPF Final    Urine culture not indicated.   • Bacteria, UA 03/16/2023 None Seen  None Seen /HPF Final   • Squamous Epithelial Cells, UA 03/16/2023 0-2  None Seen, 0-2 /HPF Final   • Hyaline Casts, UA 03/16/2023 None Seen  None  Seen /LPF Final   • Methodology 03/16/2023 Automated Microscopy   Final   Admission on 02/25/2023, Discharged on 02/25/2023   Component Date Value Ref Range Status   • Glucose 02/25/2023 91  65 - 99 mg/dL Final   • BUN 02/25/2023 7  6 - 20 mg/dL Final   • Creatinine 02/25/2023 0.67  0.57 - 1.00 mg/dL Final   • Sodium 02/25/2023 143  136 - 145 mmol/L Final   • Potassium 02/25/2023 4.3  3.5 - 5.2 mmol/L Final   • Chloride 02/25/2023 107  98 - 107 mmol/L Final   • CO2 02/25/2023 28.1  22.0 - 29.0 mmol/L Final   • Calcium 02/25/2023 9.6  8.6 - 10.5 mg/dL Final   • Total Protein 02/25/2023 7.0  6.0 - 8.5 g/dL Final   • Albumin 02/25/2023 4.3  3.5 - 5.2 g/dL Final   • ALT (SGPT) 02/25/2023 54 (H)  1 - 33 U/L Final   • AST (SGOT) 02/25/2023 44 (H)  1 - 32 U/L Final   • Alkaline Phosphatase 02/25/2023 72  39 - 117 U/L Final   • Total Bilirubin 02/25/2023 0.5  0.0 - 1.2 mg/dL Final   • Globulin 02/25/2023 2.7  gm/dL Final   • A/G Ratio 02/25/2023 1.6  g/dL Final   • BUN/Creatinine Ratio 02/25/2023 10.4  7.0 - 25.0 Final   • Anion Gap 02/25/2023 7.9  5.0 - 15.0 mmol/L Final   • eGFR 02/25/2023 108.0  >60.0 mL/min/1.73 Final   • Color, UA 02/25/2023 Red (A)  Yellow, Straw Final    Dipstick results may be inaccurate due to color interference.       • Appearance, UA 02/25/2023 Cloudy (A)  Clear Final   • pH, UA 02/25/2023 >=9.0 (H)  5.0 - 8.0 Final   • Specific Gravity, UA 02/25/2023 1.010  1.005 - 1.030 Final   • Glucose, UA 02/25/2023 Negative  Negative Final   • Ketones, UA 02/25/2023 Negative  Negative Final   • Bilirubin, UA 02/25/2023 Negative  Negative Final   • Blood, UA 02/25/2023 Large (3+) (A)  Negative Final   • Protein, UA 02/25/2023 Negative  Negative Final   • Leuk Esterase, UA 02/25/2023 Small (1+) (A)  Negative Final   • Nitrite, UA 02/25/2023 Negative  Negative Final   • Urobilinogen, UA 02/25/2023 0.2 E.U./dL  0.2 - 1.0 E.U./dL Final   • C-Reactive Protein 02/25/2023 <0.30  0.00 - 0.50 mg/dL Final   • WBC  02/25/2023 4.97  3.40 - 10.80 10*3/mm3 Final   • RBC 02/25/2023 4.78  3.77 - 5.28 10*6/mm3 Final   • Hemoglobin 02/25/2023 14.5  12.0 - 15.9 g/dL Final   • Hematocrit 02/25/2023 44.1  34.0 - 46.6 % Final   • MCV 02/25/2023 92.3  79.0 - 97.0 fL Final   • MCH 02/25/2023 30.3  26.6 - 33.0 pg Final   • MCHC 02/25/2023 32.9  31.5 - 35.7 g/dL Final   • RDW 02/25/2023 14.0  12.3 - 15.4 % Final   • RDW-SD 02/25/2023 47.3  37.0 - 54.0 fl Final   • MPV 02/25/2023 10.7  6.0 - 12.0 fL Final   • Platelets 02/25/2023 154  140 - 450 10*3/mm3 Final   • Neutrophil % 02/25/2023 47.7  42.7 - 76.0 % Final   • Lymphocyte % 02/25/2023 41.9  19.6 - 45.3 % Final   • Monocyte % 02/25/2023 6.2  5.0 - 12.0 % Final   • Eosinophil % 02/25/2023 3.6  0.3 - 6.2 % Final   • Basophil % 02/25/2023 0.4  0.0 - 1.5 % Final   • Immature Grans % 02/25/2023 0.2  0.0 - 0.5 % Final   • Neutrophils, Absolute 02/25/2023 2.37  1.70 - 7.00 10*3/mm3 Final   • Lymphocytes, Absolute 02/25/2023 2.08  0.70 - 3.10 10*3/mm3 Final   • Monocytes, Absolute 02/25/2023 0.31  0.10 - 0.90 10*3/mm3 Final   • Eosinophils, Absolute 02/25/2023 0.18  0.00 - 0.40 10*3/mm3 Final   • Basophils, Absolute 02/25/2023 0.02  0.00 - 0.20 10*3/mm3 Final   • Immature Grans, Absolute 02/25/2023 0.01  0.00 - 0.05 10*3/mm3 Final   • nRBC 02/25/2023 0.0  0.0 - 0.2 /100 WBC Final   • RBC, UA 02/25/2023 Too Numerous to Count (A)  None Seen, 0-2 /HPF Final   • WBC, UA 02/25/2023 3-5 (A)  None Seen, 0-2 /HPF Final    Urine culture not indicated.   • Bacteria, UA 02/25/2023 None Seen  None Seen /HPF Final   • Squamous Epithelial Cells, UA 02/25/2023 0-2  None Seen, 0-2 /HPF Final   • Hyaline Casts, UA 02/25/2023 None Seen  None Seen /LPF Final   • Methodology 02/25/2023 Automated Microscopy   Final        Procedure:   Urethral dilation-after an appropriate informed consent, the patient was brought to the procedure suite.  The urethra was gently anesthetized with 10 mL of 2% viscous Xylocaine jelly.   After an adequate period of topical anesthesia I went ahead and  the urethra was gently anesthetized and dilated with Wilson sounds from 16 to 26 Hungarian sequentially without complication. The patient was given gentamicin as prophylaxis with 80 mg.  Assessment/Plan:   Traumatic urethral stricture-status post dilation here in the office  Narcotic pain medication-patient has significant acute pain that I believe would be an indication for the use of narcotic pain medication.  I discussed the significant risks of pain medication and the fact that this will be a short only option and I will give her no more than a three-day supply of pain medication, I will not plan long-term medication, and that this will be sent to a pain clinic if it at all becomes necessary.  We discussed signing a pain medication agreement and the fact that we're going to run a state [facility] review to be sure the patient is not getting pain medication from elsewhere.  If this is the case, we will not give pain medication as part of the patient's treatment plan of there being prescribed a controlled substance with potential for abuse.  This patient has been well aware of the appropriate dose of such medications including the risks for somnolence, limited ability to drive and/or safety and the significant potential for overdose.  It has been made clear that these medications are for the prescribed patient only without concomitant use of alcohol or other substance unless prescribed by the medical provider.  Has completed prescribing agreement detailing the terms of continue prescribing him a controlled substance including monitoring Luis Alberto reports, the possibility of urine drug screens, and pill counts.  The patient is aware that we review LUIS ALBERTO reports on a regular basis and scan them into the chart.  History and physical examination exhibited continued safe and appropriate use of controlled substances. We also discussed the fact that the new  [place] legislation allows only a three-day prescription for pain medication.  In this situation he will be referred to a chronic pain clinic.            This document has been electronically signed by VERENICE FINK MD May 23, 2023 08:54 EDT    Dictated Utilizing Dragon Dictation: Part of this note may be an electronic transcription/translation of spoken language to printed text using the Dragon Dictation System.

## 2023-05-30 ENCOUNTER — HOSPITAL ENCOUNTER (EMERGENCY)
Facility: HOSPITAL | Age: 48
Discharge: HOME | End: 2023-05-30
Attending: EMERGENCY MEDICINE
Payer: COMMERCIAL

## 2023-05-30 VITALS
SYSTOLIC BLOOD PRESSURE: 106 MMHG | OXYGEN SATURATION: 100 % | BODY MASS INDEX: 15.76 KG/M2 | DIASTOLIC BLOOD PRESSURE: 66 MMHG | HEART RATE: 72 BPM | RESPIRATION RATE: 18 BRPM | HEIGHT: 68 IN | WEIGHT: 104 LBS | TEMPERATURE: 97.5 F

## 2023-05-30 DIAGNOSIS — G43.709 CHRONIC MIGRAINE WITHOUT AURA WITHOUT STATUS MIGRAINOSUS, NOT INTRACTABLE: Primary | ICD-10-CM

## 2023-05-30 PROCEDURE — 99283 EMERGENCY DEPT VISIT LOW MDM: CPT

## 2023-05-30 PROCEDURE — 25010000002 HEPARIN LOCK FLUSH PER 10 UNITS: Performed by: EMERGENCY MEDICINE

## 2023-05-30 PROCEDURE — 25010000002 DIPHENHYDRAMINE PER 50 MG: Performed by: EMERGENCY MEDICINE

## 2023-05-30 PROCEDURE — 96374 THER/PROPH/DIAG INJ IV PUSH: CPT

## 2023-05-30 PROCEDURE — 63710000001 ONDANSETRON ODT 4 MG TABLET DISPERSIBLE: Performed by: EMERGENCY MEDICINE

## 2023-05-30 PROCEDURE — 25010000002 DEXAMETHASONE PER 1 MG: Performed by: PHYSICIAN ASSISTANT

## 2023-05-30 PROCEDURE — 25010000002 HYDROMORPHONE 1 MG/ML SOLUTION: Performed by: EMERGENCY MEDICINE

## 2023-05-30 PROCEDURE — 96375 TX/PRO/DX INJ NEW DRUG ADDON: CPT

## 2023-05-30 RX ORDER — DIPHENHYDRAMINE HYDROCHLORIDE 50 MG/ML
25 INJECTION INTRAMUSCULAR; INTRAVENOUS ONCE
Status: COMPLETED | OUTPATIENT
Start: 2023-05-30 | End: 2023-05-30

## 2023-05-30 RX ORDER — DEXAMETHASONE SODIUM PHOSPHATE 4 MG/ML
8 INJECTION, SOLUTION INTRA-ARTICULAR; INTRALESIONAL; INTRAMUSCULAR; INTRAVENOUS; SOFT TISSUE ONCE
Status: COMPLETED | OUTPATIENT
Start: 2023-05-30 | End: 2023-05-30

## 2023-05-30 RX ORDER — HEPARIN SODIUM (PORCINE) LOCK FLUSH IV SOLN 100 UNIT/ML 100 UNIT/ML
300 SOLUTION INTRAVENOUS ONCE
Status: COMPLETED | OUTPATIENT
Start: 2023-05-30 | End: 2023-05-30

## 2023-05-30 RX ORDER — ONDANSETRON 4 MG/1
4 TABLET, ORALLY DISINTEGRATING ORAL EVERY 6 HOURS PRN
Status: DISCONTINUED | OUTPATIENT
Start: 2023-05-30 | End: 2023-05-30 | Stop reason: HOSPADM

## 2023-05-30 RX ORDER — SODIUM CHLORIDE 0.9 % (FLUSH) 0.9 %
10 SYRINGE (ML) INJECTION AS NEEDED
Status: DISCONTINUED | OUTPATIENT
Start: 2023-05-30 | End: 2023-05-30 | Stop reason: HOSPADM

## 2023-05-30 RX ADMIN — SODIUM CHLORIDE 1000 ML: 9 INJECTION, SOLUTION INTRAVENOUS at 11:03

## 2023-05-30 RX ADMIN — ONDANSETRON 4 MG: 4 TABLET, ORALLY DISINTEGRATING ORAL at 10:51

## 2023-05-30 RX ADMIN — DIPHENHYDRAMINE HYDROCHLORIDE 25 MG: 50 INJECTION INTRAMUSCULAR; INTRAVENOUS at 12:29

## 2023-05-30 RX ADMIN — Medication 300 UNITS: at 12:29

## 2023-05-30 RX ADMIN — HYDROMORPHONE HYDROCHLORIDE 1 MG: 1 INJECTION, SOLUTION INTRAMUSCULAR; INTRAVENOUS; SUBCUTANEOUS at 11:52

## 2023-05-30 RX ADMIN — DEXAMETHASONE SODIUM PHOSPHATE 8 MG: 4 INJECTION, SOLUTION INTRA-ARTICULAR; INTRALESIONAL; INTRAMUSCULAR; INTRAVENOUS; SOFT TISSUE at 11:05

## 2023-05-30 NOTE — ED PROVIDER NOTES
Subjective   History of Present Illness  Patient has a history of chronic migraines.  Patient's been at home not able to keep anything down having nausea and vomiting.  States that this headaches been going on for 4 days.  Nothing over-the-counter is helping.  Patient has past medical history of bipolar, PTSD, abdominal pain, constipation, nausea and vomiting.    History provided by:  Patient   used: No    Headache  Pain location:  Generalized  Quality:  Dull  Radiates to:  Does not radiate  Severity currently:  8/10  Severity at highest:  8/10  Onset quality:  Sudden  Duration:  1 day  Timing:  Constant  Progression:  Worsening    Review of Systems   Neurological:  Positive for headaches.     Past Medical History:   Diagnosis Date    Abdominal pain     Abdominal swelling     Hoyos's disease     Bipolar 1 disorder     Brain tumor     R Frontal Lobe per pt    Brain tumor 2014    Brain tumor (benign)     Constipation     DDD (degenerative disc disease), cervical 05/29/2017    DDD (degenerative disc disease), cervical     Diarrhea     Fibromyalgia     IBS (irritable bowel syndrome)     Migraine     Nausea & vomiting     PONV (postoperative nausea and vomiting)     PTSD (post-traumatic stress disorder)     Rectal bleeding        Allergies   Allergen Reactions    Ativan [Lorazepam] Hallucinations     confusion    Sulfa Antibiotics Shortness Of Breath and Swelling    Sulfa Antibiotics Anaphylaxis    Reglan [Metoclopramide] Angioedema    Compazine [Prochlorperazine Edisylate] Hives    Demerol [Meperidine] Hives    Droperidol Itching    Metoclopramide Swelling    Toradol [Ketorolac Tromethamine] Hives and Itching    Toradol [Ketorolac Tromethamine] Hives    Zofran [Ondansetron Hcl] Rash    Zosyn [Piperacillin Sod-Tazobactam So] Hives       Past Surgical History:   Procedure Laterality Date    ANAL SCOPE N/A 7/28/2016    Procedure: ANAL SCOPE;  Surgeon: Kael Lopez MD;  Location: [facility];  Service:      APPENDECTOMY      COLONOSCOPY N/A 2016    Procedure: COLONOSCOPY  CPTCODE:79149;  Surgeon: Jose Antonio Belle III, MD;  Location: [facility] OR;  Service:     COLONOSCOPY N/A 2016    Procedure: COLONOSCOPY (13745) CPT;  Surgeon: Jose Antonio Belle III, MD;  Location: [facility] OR;  Service:     CYSTOSCOPY RETROGRADE PYELOGRAM Bilateral 2017    Procedure: CYSTOSCOPY RETROGRADE PYELOGRAM;  Surgeon: Mu Blunt MD;  Location: [facility] OR;  Service:     ENDOSCOPY N/A 2016    Procedure: ESOPHAGOGASTRODUODENOSCOPY WITH BIOPSY  CPTCODE:33760;  Surgeon: Jose Antonio Belle III, MD;  Location: [facility] OR;  Service:     HEMORRHOIDECTOMY N/A 2016    Procedure: HEMORRHOID STAPLING;  Surgeon: Kael Lopez MD;  Location: [facility] OR;  Service:     HYSTERECTOMY      KNEE SURGERY      LAPAROSCOPIC SALPINGOOPHERECTOMY      PORTACATH PLACEMENT N/A 2017    Procedure: INSERTION OF PORTACATH;  Surgeon: Celso Arredondo MD;  Location: [facility] OR;  Service:     SHOULDER SURGERY      3 times       Family History   Problem Relation Age of Onset    Crohn's disease Other     Hypertension Other     Diabetes Other     Irritable bowel syndrome Other     No Known Problems Father     No Known Problems Mother        Social History     Socioeconomic History    Marital status:    Tobacco Use    Smoking status: Former     Packs/day: 1.00     Years: 20.00     Pack years: 20.00     Types: Cigarettes     Quit date: 2015     Years since quittin.5     Passive exposure: Past    Smokeless tobacco: Never   Vaping Use    Vaping Use: Never used   Substance and Sexual Activity    Alcohol use: No    Drug use: Yes     Types: Marijuana     Comment: for pain    Sexual activity: Defer     Birth control/protection: Surgical           Objective   Physical Exam  Vitals and nursing note reviewed.   Constitutional:       Appearance: She is well-developed.   HENT:      Head: Normocephalic.   Cardiovascular:      Rate  and Rhythm: Normal rate and regular rhythm.   Pulmonary:      Effort: Pulmonary effort is normal.      Breath sounds: Normal breath sounds.   Abdominal:      General: Bowel sounds are normal.      Palpations: Abdomen is soft.      Tenderness: There is no abdominal tenderness.   Musculoskeletal:         General: Normal range of motion.      Cervical back: Neck supple.   Skin:     General: Skin is warm and dry.   Neurological:      Mental Status: She is alert and oriented to person, place, and time.   Psychiatric:         Behavior: Behavior normal.         Thought Content: Thought content normal.         Judgment: Judgment normal.       Procedures           ED Course                                           Medical Decision Making  Problems Addressed:  Chronic migraine without aura without status migrainosus, not intractable: complicated acute illness or injury    Risk  Prescription drug management.        Final diagnoses:   Chronic migraine without aura without status migrainosus, not intractable       ED Disposition  ED Disposition       ED Disposition   Discharge    Condition   Stable    Comment   --               Mabel Patterson, APRN  [address] [address]  [phone]    In 2 days           Medication List      No changes were made to your prescriptions during this visit.          Xenia Garza PA  06/05/23 9213

## 2023-06-10 ENCOUNTER — HOSPITAL ENCOUNTER (EMERGENCY)
Facility: HOSPITAL | Age: 48
Discharge: LEFT BEFORE DISCHARGE | End: 2023-06-10
Attending: STUDENT IN AN ORGANIZED HEALTH CARE EDUCATION/TRAINING PROGRAM
Payer: COMMERCIAL

## 2023-06-10 VITALS
TEMPERATURE: 97.7 F | RESPIRATION RATE: 16 BRPM | HEART RATE: 102 BPM | OXYGEN SATURATION: 95 % | BODY MASS INDEX: 18.79 KG/M2 | DIASTOLIC BLOOD PRESSURE: 67 MMHG | WEIGHT: 124 LBS | HEIGHT: 68 IN | SYSTOLIC BLOOD PRESSURE: 116 MMHG

## 2023-06-10 DIAGNOSIS — R31.0 GROSS HEMATURIA: Primary | ICD-10-CM

## 2023-06-10 PROBLEM — Z76.5 DRUG-SEEKING BEHAVIOR: Status: ACTIVE | Noted: 2023-06-10

## 2023-06-10 PROCEDURE — 99281 EMR DPT VST MAYX REQ PHY/QHP: CPT

## 2023-06-10 RX ORDER — OXYCODONE AND ACETAMINOPHEN 10; 325 MG/1; MG/1
1 TABLET ORAL ONCE
Status: DISCONTINUED | OUTPATIENT
Start: 2023-06-10 | End: 2023-06-10 | Stop reason: HOSPADM

## 2023-06-10 RX ORDER — ONDANSETRON 2 MG/ML
4 INJECTION INTRAMUSCULAR; INTRAVENOUS ONCE
Status: DISCONTINUED | OUTPATIENT
Start: 2023-06-10 | End: 2023-06-10 | Stop reason: HOSPADM

## 2023-06-10 RX ORDER — SODIUM CHLORIDE 0.9 % (FLUSH) 0.9 %
10 SYRINGE (ML) INJECTION AS NEEDED
Status: DISCONTINUED | OUTPATIENT
Start: 2023-06-10 | End: 2023-06-10 | Stop reason: HOSPADM

## 2023-06-10 RX ORDER — OXYCODONE AND ACETAMINOPHEN 10; 325 MG/1; MG/1
1 TABLET ORAL ONCE
Status: DISCONTINUED | OUTPATIENT
Start: 2023-06-10 | End: 2023-06-10

## 2023-06-10 NOTE — ED NOTES
Dr. Gleason at bedside with patient and family speaking about POC. Family disagrees with POC, patient appears in acute distress and refusing any treatment at this time.

## 2023-06-10 NOTE — ED PROVIDER NOTES
[facility]  emergency department encounter        Pt Name: Susanna Camilo  MRN: [id number]  Birthdate 1975  Date of evaluation: 6/10/2023    Chief Complaint   Patient presents with    Flank Pain    Blood in Urine    Vomiting             HISTORY OF PRESENT ILLNESS:   Susanna Camilo is a 48 y.o. female PMH significant for posttraumatic urethral stricture.  Patient self catheterizes and has frequent urethral dilations by urologist Dr. Blunt.    Patient presents complaining predominantly of right flank pain new onset today.  Patient has associated right lower quadrant abdominal pain nausea vomiting and hematuria.  Patient reports chronic hematuria.  Patient denies fever chest pain respiratory complaints diarrhea headache vision changes myalgias.    Patient reports her pain feels like prior kidney stones.  Patient reports she has passed countless kidney stones, estimated >20.  Patient denies that she has ever required surgical intervention for her kidney stones.    Chart review reveals 16 prior CT abdomen pelvis that have never shown evidence of ureteral lithiasis, hydronephrosis or nephrolithiasis.  Patient has never had obstructive uropathy.  Her urinalysis disease over the past couple of years show chronic hematuria almost without exception.    Chart review additionally reveals the patient was seen 2 days ago at [facility] with similar complaint.  Patient had CT scan that was negative for acute abnormality.  She received Dilaudid 1 mg and morphine 8 mg during that visit.  Patient did not disclose this on arrival.  When this was brought up to the patient she then reports that her pain has been ongoing for the past few days not just today.  When asked whether she tried to schedule an appoint with Dr. Blunt over the past 2 days she reports that she was told she would have to call on Monday to schedule an appointment.  Dr. Blunt's notes do not make any mention of history of nephrolithiasis.       PCP: Mabel Patterson APRN          REVIEW OF SYSTEMS:     Review of Systems   Constitutional:  Negative for fever.   HENT:  Negative for congestion and rhinorrhea.    Eyes:  Negative for visual disturbance.   Respiratory:  Negative for cough and shortness of breath.    Cardiovascular:  Negative for chest pain.   Gastrointestinal:  Positive for abdominal pain, nausea and vomiting.   Genitourinary:  Positive for flank pain and hematuria.   Musculoskeletal:  Negative for myalgias.   Skin:  Negative for rash.   Neurological:  Negative for headaches.   Psychiatric/Behavioral:  Negative for confusion.      Review of systems otherwise as per HPI.          PREVIOUS HISTORY:         Past Medical History:   Diagnosis Date    Hoyos's disease     Bipolar 1 disorder     Constipation     DDD (degenerative disc disease), cervical 05/29/2017    Fibromyalgia     IBS (irritable bowel syndrome)     Meningioma     Migraine     PONV (postoperative nausea and vomiting)     PTSD (post-traumatic stress disorder)     Rectal bleeding            Past Surgical History:   Procedure Laterality Date    ANAL SCOPE N/A 7/28/2016    Procedure: ANAL SCOPE;  Surgeon: Kael Lopez MD;  Location: [facility] OR;  Service:     APPENDECTOMY      COLONOSCOPY N/A 6/30/2016    Procedure: COLONOSCOPY  CPTCODE:51910;  Surgeon: Jose Antonio Belle III, MD;  Location: [facility] OR;  Service:     COLONOSCOPY N/A 7/7/2016    Procedure: COLONOSCOPY (61952) CPT;  Surgeon: Jose Antonio Belle III, MD;  Location: [facility] OR;  Service:     CYSTOSCOPY RETROGRADE PYELOGRAM Bilateral 4/28/2017    Procedure: CYSTOSCOPY RETROGRADE PYELOGRAM;  Surgeon: Mu Blunt MD;  Location: [facility] OR;  Service:     ENDOSCOPY N/A 6/30/2016    Procedure: ESOPHAGOGASTRODUODENOSCOPY WITH BIOPSY  CPTCODE:73230;  Surgeon: Jose Antonio Belle III, MD;  Location: [facility] OR;  Service:     HEMORRHOIDECTOMY N/A 7/28/2016    Procedure: HEMORRHOID STAPLING;  Surgeon: Kael Lopez  MD;  Location: [facility] OR;  Service:     HYSTERECTOMY      KNEE SURGERY      LAPAROSCOPIC SALPINGOOPHERECTOMY      PORTACATH PLACEMENT N/A 2017    Procedure: INSERTION OF PORTACATH;  Surgeon: Celso Arredondo MD;  Location: [facility] OR;  Service:     SHOULDER SURGERY      3 times           Social History     Socioeconomic History    Marital status:    Tobacco Use    Smoking status: Former     Packs/day: 1.00     Years: 20.00     Pack years: 20.00     Types: Cigarettes     Quit date: 2015     Years since quittin.6     Passive exposure: Past    Smokeless tobacco: Never   Vaping Use    Vaping Use: Never used   Substance and Sexual Activity    Alcohol use: No    Drug use: Yes     Types: Marijuana     Comment: for pain    Sexual activity: Defer     Birth control/protection: Surgical           Family History   Problem Relation Age of Onset    Crohn's disease Other     Hypertension Other     Diabetes Other     Irritable bowel syndrome Other     No Known Problems Father     No Known Problems Mother          Current Outpatient Medications   Medication Instructions    albuterol (PROVENTIL HFA;VENTOLIN HFA) 108 (90 Base) MCG/ACT inhaler 2 puffs, Inhalation, 2 Times Daily    Azelastine HCl 137 MCG/SPRAY solution 1 spray, Nasal, As Needed    busPIRone (BUSPAR) 15 mg, Oral, 3 Times Daily    diclofenac (VOLTAREN) 1 % gel gel No dose, route, or frequency recorded.    divalproex (DEPAKOTE) 500 mg, Oral, 3 Times Daily    docusate sodium (COLACE) 100 mg, Oral, 2 Times Daily    docusate sodium (COLACE) 100 mg, Oral, 2 Times Daily PRN    fluticasone (VERAMYST) 27.5 MCG/SPRAY nasal spray 2 sprays, Nasal, Daily    megestrol (MEGACE) 20 mg, Oral, Daily    methylPREDNISolone (MEDROL) 4 MG dose pack Take as directed on package instructions.    metroNIDAZOLE (FLAGYL) 500 mg, Oral, 3 Times Daily    montelukast (SINGULAIR) 10 mg, Oral, Nightly    ondansetron ODT (ZOFRAN-ODT) 8 mg, Translingual, Every 8 Hours PRN     "oxyCODONE-acetaminophen (Percocet)  MG per tablet 1 tablet, Oral, Every 6 Hours PRN    oxyCODONE-acetaminophen (PERCOCET) 5-325 MG per tablet 1 tablet, Oral, Every 8 Hours PRN    pantoprazole (PROTONIX) 40 mg, Oral, 2 Times Daily PRN    phenazopyridine (PYRIDIUM) 100 mg, Oral, 3 Times Daily PRN    polyethylene glycol (MIRALAX) 17 g, Oral, Daily    promethazine (PHENERGAN) 25 mg, Oral, Every 6 Hours PRN    promethazine (PHENERGAN) 25 mg, Oral, Every 6 Hours PRN    sertraline (ZOLOFT) 100 mg, Oral, 2 Times Daily    SUMAtriptan (IMITREX) 6 mg, Subcutaneous, Once    terazosin (HYTRIN) 2 mg, Oral, Nightly    traMADol (ULTRAM) 50 MG tablet No dose, route, or frequency recorded.         Allergies:  Ativan [lorazepam], Sulfa antibiotics, Sulfa antibiotics, Reglan [metoclopramide], Compazine [prochlorperazine edisylate], Demerol [meperidine], Droperidol, Metoclopramide, Toradol [ketorolac tromethamine], Toradol [ketorolac tromethamine], and Zosyn [piperacillin sod-tazobactam so]          PHYSICAL EXAM:     Patient Vitals for the past 24 hrs:   BP Temp Temp src Pulse Resp SpO2 Height Weight   06/10/23 1504 116/67 97.7 °F (36.5 °C) Infrared 102 16 95 % 172.7 cm (68\") 56.2 kg (124 lb)       Physical Exam  Vitals and nursing note reviewed.   Constitutional:       General: She is not in acute distress.  HENT:      Head: Normocephalic and atraumatic.   Eyes:      Extraocular Movements: Extraocular movements intact.      Conjunctiva/sclera: Conjunctivae normal.   Pulmonary:      Effort: Pulmonary effort is normal. No respiratory distress.   Abdominal:      General: Abdomen is flat. There is no distension.      Palpations: Abdomen is soft.      Tenderness: There is no abdominal tenderness.      Comments: Patient displays inconsistent signs of CVA tenderness on percussion.   Musculoskeletal:         General: No deformity. Normal range of motion.      Cervical back: Normal range of motion. No rigidity.   Skin:     Coloration: " Skin is not jaundiced.      Findings: No rash.   Neurological:      General: No focal deficit present.      Mental Status: She is alert and oriented to person, place, and time.   Psychiatric:         Mood and Affect: Mood normal.         Behavior: Behavior normal.           COMPLETED DIAGNOSTIC STUDIES AND INTERVENTIONS:     Lab Results (last 24 hours)       ** No results found for the last 24 hours. **              No orders to display         New Medications Ordered This Visit   Medications    sodium chloride 0.9 % flush 10 mL    ondansetron (ZOFRAN) injection 4 mg    sodium chloride 0.9 % bolus 1,000 mL    oxyCODONE-acetaminophen (PERCOCET)  MG per tablet 1 tablet         Procedures            MEDICAL DECISION-MAKING AND ED COURSE:     ED Course as of 06/10/23 1752   Sat Stas 10, 2023   1602 48-year-old female with medical history significant for posttraumatic urethral stricture presents hemodynamically stable, nontoxic and well-appearing with a predominant complaint of right flank pain and hematuria new onset today that is consistent with countless prior episodes of nephrolithiasis.  Patient has no objective findings of prior nephrolithiasis despite 16 prior abdomen pelvis CT scans.  Patient was also seen and evaluated at another emergency department for the same complaint 2 days ago with reassuring CT.  Patient's history provided is suspect for drug-seeking behavior.  I recommended work-up to evaluate for kidney injury and infectious cause of pain as patient frequently self catheterizes and is high risk for pyelonephritis.  Blood work and urinalysis ordered.  Ordered treatment with 1 L NS IVF and Zofran.  I told patient that I would not give her opioids at this time as there is no clear evidence of acute disease process at this time, she had reassuring negative work-up 2 days ago with which she was not forthright and reports passing countless kidney stones without ever having any such objective evidence  despite numerous CT scans.  Ultimately patient became upset and decided to leave not allowing for risks and benefits discussion regarding leaving AGAINST MEDICAL ADVICE.  Patient did not meet criteria for involuntary hold. [KP]      ED Course User Index  [KP] Ankit Gleason MD       ?      FINAL IMPRESSION:       1. Gross hematuria         The complaints listed here are new problems to this examiner.       Medication List      No changes were made to your prescriptions during this visit.         FOLLOW-UP  No follow-up provider specified.    DISPOSITION  ED Disposition       ED Disposition   AMA    Condition   --    Comment   --                   Please note that portions of this note were completed with a voice recognition program.      Ankit Gleason MD  17:52 EDT  6/10/2023               Ankit Gleason MD  06/10/23 1366       Ankit Gleason MD  06/10/23 1368

## 2023-06-10 NOTE — ED NOTES
Patient and  decided to leave [place] and refused to sign paperwork prior to leaving. Dr. Gleason notified.

## 2023-07-27 ENCOUNTER — TELEPHONE (OUTPATIENT)
Dept: UROLOGY | Facility: CLINIC | Age: 48
End: 2023-07-27

## 2023-07-27 ENCOUNTER — OFFICE VISIT (OUTPATIENT)
Dept: UROLOGY | Facility: CLINIC | Age: 48
End: 2023-07-27
Payer: COMMERCIAL

## 2023-07-27 VITALS
BODY MASS INDEX: 15.28 KG/M2 | HEIGHT: 68 IN | DIASTOLIC BLOOD PRESSURE: 84 MMHG | HEART RATE: 74 BPM | SYSTOLIC BLOOD PRESSURE: 112 MMHG | WEIGHT: 100.8 LBS

## 2023-07-27 DIAGNOSIS — N23 RENAL COLIC: ICD-10-CM

## 2023-07-27 DIAGNOSIS — R35.0 FREQUENCY OF MICTURITION: ICD-10-CM

## 2023-07-27 DIAGNOSIS — N35.028 OTHER POST-TRAUMATIC URETHRAL STRICTURE, FEMALE: ICD-10-CM

## 2023-07-27 DIAGNOSIS — F31.9 BIPOLAR 1 DISORDER: ICD-10-CM

## 2023-07-27 RX ORDER — MEGESTROL ACETATE 20 MG/1
20 TABLET ORAL DAILY
Qty: 30 TABLET | Refills: 3 | Status: SHIPPED | OUTPATIENT
Start: 2023-07-27

## 2023-07-27 RX ORDER — PROMETHAZINE HYDROCHLORIDE 25 MG/1
25 TABLET ORAL EVERY 6 HOURS PRN
Qty: 21 TABLET | Refills: 5 | Status: SHIPPED | OUTPATIENT
Start: 2023-07-27

## 2023-07-27 RX ORDER — GENTAMICIN SULFATE 40 MG/ML
80 INJECTION, SOLUTION INTRAMUSCULAR; INTRAVENOUS ONCE
Status: COMPLETED | OUTPATIENT
Start: 2023-07-27 | End: 2023-07-27

## 2023-07-27 RX ORDER — OXYCODONE AND ACETAMINOPHEN 10; 325 MG/1; MG/1
1 TABLET ORAL EVERY 6 HOURS PRN
Qty: 16 TABLET | Refills: 0 | Status: SHIPPED | OUTPATIENT
Start: 2023-07-27

## 2023-07-27 RX ADMIN — GENTAMICIN SULFATE 80 MG: 40 INJECTION, SOLUTION INTRAMUSCULAR; INTRAVENOUS at 09:27

## 2023-07-27 NOTE — PROGRESS NOTES
Chief Complaint:      Chief Complaint   Patient presents with    Urethra Stricture      Follow up        HPI:   48 y.o. female for urethral dilation she was seen in the emergency room.    Past Medical History:     Past Medical History:   Diagnosis Date    Hoyos's disease     Bipolar 1 disorder     Constipation     DDD (degenerative disc disease), cervical 05/29/2017    Fibromyalgia     IBS (irritable bowel syndrome)     Meningioma     Migraine     PONV (postoperative nausea and vomiting)     PTSD (post-traumatic stress disorder)     Rectal bleeding        Current Meds:     Current Outpatient Medications   Medication Sig Dispense Refill    albuterol (PROVENTIL HFA;VENTOLIN HFA) 108 (90 Base) MCG/ACT inhaler Inhale 2 puffs 2 (Two) Times a Day.      Azelastine HCl 137 MCG/SPRAY solution 1 spray into the nostril(s) as directed by provider As Needed (Allergies).      busPIRone (BUSPAR) 15 MG tablet Take 1 tablet by mouth 3 (Three) Times a Day.      diclofenac (VOLTAREN) 1 % gel gel       divalproex (DEPAKOTE) 500 MG DR tablet Take 1 tablet by mouth 3 (Three) Times a Day. 90 tablet 2    docusate sodium (COLACE) 100 MG capsule Take 1 capsule by mouth 2 (Two) Times a Day. 20 capsule 0    docusate sodium (COLACE) 100 MG capsule Take 1 capsule by mouth 2 (Two) Times a Day As Needed for Constipation. 60 capsule 0    fluticasone (VERAMYST) 27.5 MCG/SPRAY nasal spray 2 sprays into the nostril(s) as directed by provider Daily.      megestrol (MEGACE) 20 MG tablet Take 1 tablet by mouth Daily. 30 tablet 3    methylPREDNISolone (MEDROL) 4 MG dose pack Take as directed on package instructions. 21 tablet 0    metroNIDAZOLE (FLAGYL) 500 MG tablet Take 1 tablet by mouth 3 (Three) Times a Day. 30 tablet 0    montelukast (SINGULAIR) 10 MG tablet Take 1 tablet by mouth Every Night.      ondansetron ODT (ZOFRAN-ODT) 4 MG disintegrating tablet Place 2 tablets on the tongue Every 8 (Eight) Hours As Needed for Nausea or Vomiting. 30 tablet 0     oxyCODONE-acetaminophen (Percocet)  MG per tablet Take 1 tablet by mouth Every 6 (Six) Hours As Needed for Moderate Pain. 20 tablet 0    oxyCODONE-acetaminophen (Percocet)  MG per tablet Take 1 tablet by mouth Every 6 (Six) Hours As Needed for Moderate Pain. 16 tablet 0    oxyCODONE-acetaminophen (PERCOCET) 5-325 MG per tablet Take 1 tablet by mouth Every 8 (Eight) Hours As Needed for Moderate Pain. 12 tablet 0    pantoprazole (PROTONIX) 40 MG EC tablet Take 1 tablet by mouth 2 (Two) Times a Day As Needed.      phenazopyridine (PYRIDIUM) 100 MG tablet Take 1 tablet by mouth 3 (Three) Times a Day As Needed for Bladder Spasms. 30 tablet 1    polyethylene glycol (MIRALAX) 17 GM/SCOOP powder Take 17 g by mouth Daily. 225 g 0    promethazine (PHENERGAN) 25 MG tablet TAKE ONE TABLET BY MOUTH EVERY 6 HOURS AS NEEDED FOR NAUSEA AND VOMITING 21 tablet 5    promethazine (PHENERGAN) 25 MG tablet Take 1 tablet by mouth Every 6 (Six) Hours As Needed for Nausea or Vomiting. 28 tablet 4    sertraline (ZOLOFT) 100 MG tablet Take 1 tablet by mouth 2 (Two) Times a Day.      SUMAtriptan (IMITREX) 6 MG/0.5ML injection Inject 6 mg under the skin 1 (One) Time.      terazosin (HYTRIN) 2 MG capsule Take 1 capsule by mouth Every Night for 14 days. 14 capsule 0    traMADol (ULTRAM) 50 MG tablet        No current facility-administered medications for this visit.        Allergies:      Allergies   Allergen Reactions    Ativan [Lorazepam] Hallucinations     confusion    Sulfa Antibiotics Shortness Of Breath and Swelling    Sulfa Antibiotics Anaphylaxis    Reglan [Metoclopramide] Angioedema    Compazine [Prochlorperazine Edisylate] Hives    Demerol [Meperidine] Hives    Droperidol Itching    Metoclopramide Swelling    Toradol [Ketorolac Tromethamine] Hives and Itching    Toradol [Ketorolac Tromethamine] Hives    Zosyn [Piperacillin Sod-Tazobactam So] Hives        Past Surgical History:     Past Surgical History:   Procedure  Laterality Date    ANAL SCOPE N/A 2016    Procedure: ANAL SCOPE;  Surgeon: Kael Lopez MD;  Location: [facility] OR;  Service:     APPENDECTOMY      COLONOSCOPY N/A 2016    Procedure: COLONOSCOPY  CPTCODE:61444;  Surgeon: Jose Antonio Belle III, MD;  Location: [facility] OR;  Service:     COLONOSCOPY N/A 2016    Procedure: COLONOSCOPY (32637) CPT;  Surgeon: Jose Antonio Belle III, MD;  Location: [facility] OR;  Service:     CYSTOSCOPY RETROGRADE PYELOGRAM Bilateral 2017    Procedure: CYSTOSCOPY RETROGRADE PYELOGRAM;  Surgeon: Mu Blunt MD;  Location: [facility] OR;  Service:     ENDOSCOPY N/A 2016    Procedure: ESOPHAGOGASTRODUODENOSCOPY WITH BIOPSY  CPTCODE:42517;  Surgeon: Jose Antonio Belle III, MD;  Location: [facility] OR;  Service:     HEMORRHOIDECTOMY N/A 2016    Procedure: HEMORRHOID STAPLING;  Surgeon: Kael Lopez MD;  Location: [facility] OR;  Service:     HYSTERECTOMY      KNEE SURGERY      LAPAROSCOPIC SALPINGOOPHERECTOMY      PORTACATH PLACEMENT N/A 2017    Procedure: INSERTION OF PORTACATH;  Surgeon: Celso Arredondo MD;  Location: [facility] OR;  Service:     SHOULDER SURGERY      3 times       Social History:     Social History     Socioeconomic History    Marital status:    Tobacco Use    Smoking status: Former     Packs/day: 1.00     Years: 20.00     Pack years: 20.00     Types: Cigarettes     Quit date: 2015     Years since quittin.7     Passive exposure: Past    Smokeless tobacco: Never   Vaping Use    Vaping Use: Never used   Substance and Sexual Activity    Alcohol use: No    Drug use: Yes     Types: Marijuana     Comment: for pain    Sexual activity: Defer     Birth control/protection: Surgical       Family History:     Family History   Problem Relation Age of Onset    Crohn's disease Other     Hypertension Other     Diabetes Other     Irritable bowel syndrome Other     No Known Problems Father     No Known Problems Mother        Review of  Systems:     Review of Systems   Constitutional: Negative.  Negative for activity change, appetite change, chills, diaphoresis, fatigue and unexpected weight change.   HENT:  Negative for congestion, dental problem, drooling, ear discharge, ear pain, facial swelling, hearing loss, mouth sores, nosebleeds, postnasal drip, rhinorrhea, sinus pressure, sneezing, sore throat, tinnitus, trouble swallowing and voice change.    Eyes: Negative.  Negative for photophobia, pain, discharge, redness, itching and visual disturbance.   Respiratory: Negative.  Negative for apnea, cough, choking, chest tightness, shortness of breath, wheezing and stridor.    Cardiovascular: Negative.  Negative for chest pain, palpitations and leg swelling.   Gastrointestinal: Negative.  Negative for abdominal distention, abdominal pain, anal bleeding, blood in stool, constipation, diarrhea, nausea, rectal pain and vomiting.   Endocrine: Negative.  Negative for cold intolerance, heat intolerance, polydipsia, polyphagia and polyuria.   Musculoskeletal: Negative.  Negative for arthralgias, back pain, gait problem, joint swelling, myalgias, neck pain and neck stiffness.   Skin: Negative.  Negative for color change, pallor, rash and wound.   Allergic/Immunologic: Negative.  Negative for environmental allergies, food allergies and immunocompromised state.   Neurological: Negative.  Negative for dizziness, tremors, seizures, syncope, facial asymmetry, speech difficulty, weakness, light-headedness, numbness and headaches.   Hematological: Negative.  Negative for adenopathy. Does not bruise/bleed easily.   Psychiatric/Behavioral:  Negative for agitation, behavioral problems, confusion, decreased concentration, dysphoric mood, hallucinations, self-injury, sleep disturbance and suicidal ideas. The patient is not nervous/anxious and is not hyperactive.    All other systems reviewed and are negative.    Physical Exam:     Physical Exam  Constitutional:        Appearance: She is well-developed.   HENT:      Head: Normocephalic and atraumatic.      Right Ear: External ear normal.      Left Ear: External ear normal.   Eyes:      Conjunctiva/sclera: Conjunctivae normal.      Pupils: Pupils are equal, round, and reactive to light.   Cardiovascular:      Rate and Rhythm: Normal rate and regular rhythm.      Heart sounds: Normal heart sounds.   Pulmonary:      Effort: Pulmonary effort is normal.      Breath sounds: Normal breath sounds.   Abdominal:      General: Bowel sounds are normal. There is no distension.      Palpations: Abdomen is soft. There is no mass.      Tenderness: There is no abdominal tenderness. There is no guarding or rebound.   Genitourinary:     General: Normal vulva.      Vagina: No vaginal discharge.   Musculoskeletal:         General: Normal range of motion.   Skin:     General: Skin is warm and dry.   Neurological:      Mental Status: She is alert.      Deep Tendon Reflexes: Reflexes are normal and symmetric.   Psychiatric:         Behavior: Behavior normal.         Thought Content: Thought content normal.         Judgment: Judgment normal.       I have reviewed the following portions of the patient's history: Allergies, current medications, past family history, past medical history, past social history, past surgical history, problem list, and ROS and confirm it is accurate.    Recent Image (CT and/or KUB):      CT Abdomen and Pelvis: No results found for this or any previous visit.       CT Stone Protocol: Results for orders placed during the hospital encounter of 03/16/23    CT Abdomen Pelvis Stone Protocol    Narrative  CT ABDOMEN AND PELVIS, NONCONTRAST, 3/16/2023    HISTORY:  48-year-old female in the ED with lower abdomen pain, hematuria.    TECHNIQUE:  CT imaging of the abdomen and pelvis ordered as noncontrast kidney stone protocol exam. Radiation dose reduction techniques included automated exposure control. Radiation audit for CT and nuclear  cardiology exams in the last 12 months: 0.    COMPARISON:  *  CT abdomen/pelvis, 10/3/2022.    ABDOMEN FINDINGS:  Both kidneys, both ureters and urinary bladder are normal in noncontrast CT appearance. No visible radiopaque renal or urinary tract stone material. No evidence of upper urinary tract obstruction.    No gallbladder distention or bile duct dilatation. Liver, pancreas and spleen are normal in size and appearance without contrast. Normal caliber abdominal aorta.    Small bowel and colon are normal in caliber and appearance, as imaged. The appendix is not directly visualized, but there is no indirect CT evidence of acute appendicitis or other active inflammation in the ileocecal region. No gastric distention, hiatal  hernia or distal esophageal dilatation.    PELVIS FINDINGS:  Hysterectomy. Urinary bladder and rectum are within normal limits. No free pelvic fluid. No inguinal hernia.    Lung base images show no active disease. There is some cylindrical bronchiectasis involving the posterior segment right lower lobe. Fluid opacification of one of the bronchiectatic segments caused a nodular appearance on prior imaging, but this has  resolved today. There is air trapping within this segment.    Mild chronic superior endplate vertebral compression fracture deformity is incidentally noted at L2, unchanged.    Impression  1. No acute abnormality within the abdomen or pelvis.  2. No visible nephrolithiasis or evidence of urinary obstruction.  3. Mild chronic bronchiectasis in the posterior right lower lobe.  4. Mild chronic fracture deformity of the L2 vertebral body.    Signer Name: Sang Painting MD  Signed: 3/16/2023 6:02 PM  Workstation [facility]: [facility]  [facility]       KUB: Results for orders placed during the hospital encounter of 01/10/23    XR Abdomen KUB    Narrative  EXAM:  XR Abdomen, 1 View    EXAM DATE:  1/10/2023 12:05 PM    CLINICAL HISTORY:  flank  pain    TECHNIQUE:  Frontal supine view of the abdomen/pelvis.    COMPARISON:  10/01/2022    FINDINGS:  Gastrointestinal tract:  Unremarkable.  No dilation.  Organs:  No stones identified along the expected course of ureters.  Bones/joints:  Unremarkable.  Soft tissues:  Surgical staples in the lower pelvis.  Vasculature:  Stable phleboliths lower pelvis.    Impression  1.  No acute findings radiographically evident.  2.  No radiographic evidence of renal or ureteral stones.    This report was finalized on 1/10/2023 12:47 PM by Dr. Ankit Naik MD.       Labs (past 3 months):      No visits with results within 3 Month(s) from this visit.   Latest known visit with results is:   Admission on 04/19/2023, Discharged on 04/19/2023   Component Date Value Ref Range Status    Color, UA 04/19/2023 Red (A)  Yellow, Straw Final    Appearance, UA 04/19/2023 Cloudy (A)  Clear Final    pH, UA 04/19/2023 6.0  5.0 - 8.0 Final    Specific Gravity, UA 04/19/2023 1.012  1.005 - 1.030 Final    Glucose, UA 04/19/2023 Negative  Negative Final    Ketones, UA 04/19/2023 Negative  Negative Final    Bilirubin, UA 04/19/2023 Negative  Negative Final    Blood, UA 04/19/2023 Large (3+) (A)  Negative Final    Protein, UA 04/19/2023 30 mg/dL (1+) (A)  Negative Final    Leuk Esterase, UA 04/19/2023 Small (1+) (A)  Negative Final    Nitrite, UA 04/19/2023 Negative  Negative Final    Urobilinogen, UA 04/19/2023 0.2 E.U./dL  0.2 - 1.0 E.U./dL Final    RBC, UA 04/19/2023 Too Numerous to Count (A)  None Seen, 0-2 /HPF Final    WBC, UA 04/19/2023 3-5 (A)  None Seen, 0-2 /HPF Final    Bacteria, UA 04/19/2023 None Seen  None Seen /HPF Final    Squamous Epithelial Cells, UA 04/19/2023 0-2  None Seen, 0-2 /HPF Final    Hyaline Casts, UA 04/19/2023 3-6  None Seen /LPF Final    Methodology 04/19/2023 Automated Microscopy   Final        Procedure:   Urethral dilation-after an appropriate informed consent, the patient was brought to the procedure suite.  The  urethra was gently anesthetized with 10 mL of 2% viscous Xylocaine jelly.  After an adequate period of topical anesthesia I went ahead and  the urethra was gently anesthetized and dilated with [place] sounds from 16 to 26 La[place] sequentially without complication. The patient was given gentamicin as prophylaxis with 80 mg.  Assessment/Plan:   Posttraumatic urethral stricture status post dilation successfully  Narcotic pain medication-patient has significant acute pain that I believe would be an indication for the use of narcotic pain medication.  I discussed the significant risks of pain medication and the fact that this will be a short only option and I will give her no more than a three-day supply of pain medication, I will not plan long-term medication, and that this will be sent to a pain clinic if it at all becomes necessary.  We discussed signing a pain medication agreement and the fact that we're going to run a state [place] review to be sure the patient is not getting pain medication from elsewhere.  If this is the case, we will not give pain medication as part of the patient's treatment plan of there being prescribed a controlled substance with potential for abuse.  This patient has been well aware of the appropriate dose of such medications including the risks for somnolence, limited ability to drive and/or safety and the significant potential for overdose.  It has been made clear that these medications are for the prescribed patient only without concomitant use of alcohol or other substance unless prescribed by the medical provider.  Has completed prescribing agreement detailing the terms of continue prescribing him a controlled substance including monitoring [place] reports, the possibility of urine drug screens, and pill counts.  The patient is aware that we review [place]ERTO reports on a regular basis and scan them into the chart.  History and physical examination exhibited continued safe and appropriate  use of controlled substances. We also discussed the fact that the new [place] legislation allows only a three-day prescription for pain medication.  In this situation he will be referred to a chronic pain clinic.            This document has been electronically signed by VERENICE FINK MD July 27, 2023 09:15 EDT    Dictated Utilizing Dragon Dictation: Part of this note may be an electronic transcription/translation of spoken language to printed text using the Dragon Dictation System.

## 2023-07-27 NOTE — TELEPHONE ENCOUNTER
Patient the pharmacy told her there was not a prescription sent in for megace. Patient requesting it be sent.

## 2023-08-09 ENCOUNTER — TELEPHONE (OUTPATIENT)
Dept: UROLOGY | Facility: CLINIC | Age: 48
End: 2023-08-09
Payer: COMMERCIAL

## 2023-08-09 NOTE — TELEPHONE ENCOUNTER
Pt came in and said that her dentist would not send her in pain medication with her 3 abscessed teeth. Since Merlene wrote her pain meds. I told her he was on vacation and he could not write pain meds for dental issues.  The pt was last prescribed this in July when she came and had a urethral dilatation. He only write it for her when she has this done. Which is every other month or so

## 2023-08-22 ENCOUNTER — OFFICE VISIT (OUTPATIENT)
Dept: UROLOGY | Facility: CLINIC | Age: 48
End: 2023-08-22
Payer: COMMERCIAL

## 2023-08-22 VITALS
BODY MASS INDEX: 15.04 KG/M2 | HEIGHT: 68 IN | DIASTOLIC BLOOD PRESSURE: 79 MMHG | SYSTOLIC BLOOD PRESSURE: 117 MMHG | HEART RATE: 109 BPM | WEIGHT: 99.2 LBS

## 2023-08-22 DIAGNOSIS — R35.0 FREQUENCY OF MICTURITION: ICD-10-CM

## 2023-08-22 DIAGNOSIS — N35.028 OTHER POST-TRAUMATIC URETHRAL STRICTURE, FEMALE: Primary | ICD-10-CM

## 2023-08-22 DIAGNOSIS — N23 RENAL COLIC: ICD-10-CM

## 2023-08-22 RX ORDER — OXYCODONE AND ACETAMINOPHEN 10; 325 MG/1; MG/1
1 TABLET ORAL EVERY 6 HOURS PRN
Qty: 20 TABLET | Refills: 0 | Status: SHIPPED | OUTPATIENT
Start: 2023-08-22

## 2023-08-22 RX ORDER — PROMETHAZINE HYDROCHLORIDE 25 MG/1
25 TABLET ORAL EVERY 6 HOURS PRN
Qty: 21 TABLET | Refills: 5 | Status: SHIPPED | OUTPATIENT
Start: 2023-08-22

## 2023-08-22 RX ORDER — GENTAMICIN SULFATE 40 MG/ML
80 INJECTION, SOLUTION INTRAMUSCULAR; INTRAVENOUS ONCE
Status: COMPLETED | OUTPATIENT
Start: 2023-08-22 | End: 2023-08-22

## 2023-08-22 RX ADMIN — GENTAMICIN SULFATE 80 MG: 40 INJECTION, SOLUTION INTRAMUSCULAR; INTRAVENOUS at 09:11

## 2023-08-22 NOTE — PROGRESS NOTES
Chief Complaint:      Chief Complaint   Patient presents with    Urethral Stricture      Dilation / follow up        HPI:   48 y.o. female for dilation    Past Medical History:     Past Medical History:   Diagnosis Date    Hoyos's disease     Bipolar 1 disorder     Constipation     DDD (degenerative disc disease), cervical 05/29/2017    Fibromyalgia     IBS (irritable bowel syndrome)     Meningioma     Migraine     PONV (postoperative nausea and vomiting)     PTSD (post-traumatic stress disorder)     Rectal bleeding        Current Meds:     Current Outpatient Medications   Medication Sig Dispense Refill    albuterol (PROVENTIL HFA;VENTOLIN HFA) 108 (90 Base) MCG/ACT inhaler Inhale 2 puffs 2 (Two) Times a Day.      Azelastine HCl 137 MCG/SPRAY solution 1 spray into the nostril(s) as directed by provider As Needed (Allergies).      busPIRone (BUSPAR) 15 MG tablet Take 1 tablet by mouth 3 (Three) Times a Day.      diclofenac (VOLTAREN) 1 % gel gel       divalproex (DEPAKOTE) 500 MG DR tablet Take 1 tablet by mouth 3 (Three) Times a Day. 90 tablet 2    docusate sodium (COLACE) 100 MG capsule Take 1 capsule by mouth 2 (Two) Times a Day. 20 capsule 0    docusate sodium (COLACE) 100 MG capsule Take 1 capsule by mouth 2 (Two) Times a Day As Needed for Constipation. 60 capsule 0    fluticasone (VERAMYST) 27.5 MCG/SPRAY nasal spray 2 sprays into the nostril(s) as directed by provider Daily.      megestrol (MEGACE) 20 MG tablet Take 1 tablet by mouth Daily. 30 tablet 3    methylPREDNISolone (MEDROL) 4 MG dose pack Take as directed on package instructions. 21 tablet 0    metroNIDAZOLE (FLAGYL) 500 MG tablet Take 1 tablet by mouth 3 (Three) Times a Day. 30 tablet 0    montelukast (SINGULAIR) 10 MG tablet Take 1 tablet by mouth Every Night.      ondansetron ODT (ZOFRAN-ODT) 4 MG disintegrating tablet Place 2 tablets on the tongue Every 8 (Eight) Hours As Needed for Nausea or Vomiting. 30 tablet 0    oxyCODONE-acetaminophen  (Percocet)  MG per tablet Take 1 tablet by mouth Every 6 (Six) Hours As Needed for Moderate Pain. 20 tablet 0    oxyCODONE-acetaminophen (Percocet)  MG per tablet Take 1 tablet by mouth Every 6 (Six) Hours As Needed for Moderate Pain. 16 tablet 0    oxyCODONE-acetaminophen (PERCOCET) 5-325 MG per tablet Take 1 tablet by mouth Every 8 (Eight) Hours As Needed for Moderate Pain. 12 tablet 0    pantoprazole (PROTONIX) 40 MG EC tablet Take 1 tablet by mouth 2 (Two) Times a Day As Needed.      phenazopyridine (PYRIDIUM) 100 MG tablet Take 1 tablet by mouth 3 (Three) Times a Day As Needed for Bladder Spasms. 30 tablet 1    polyethylene glycol (MIRALAX) 17 GM/SCOOP powder Take 17 g by mouth Daily. 225 g 0    promethazine (PHENERGAN) 25 MG tablet Take 1 tablet by mouth Every 6 (Six) Hours As Needed for Nausea or Vomiting. 28 tablet 4    promethazine (PHENERGAN) 25 MG tablet Take 1 tablet by mouth Every 6 (Six) Hours As Needed for Nausea or Vomiting. 21 tablet 5    sertraline (ZOLOFT) 100 MG tablet Take 1 tablet by mouth 2 (Two) Times a Day.      SUMAtriptan (IMITREX) 6 MG/0.5ML injection Inject 6 mg under the skin 1 (One) Time.      terazosin (HYTRIN) 2 MG capsule Take 1 capsule by mouth Every Night for 14 days. 14 capsule 0    traMADol (ULTRAM) 50 MG tablet        No current facility-administered medications for this visit.        Allergies:      Allergies   Allergen Reactions    Ativan [Lorazepam] Hallucinations     confusion    Sulfa Antibiotics Shortness Of Breath and Swelling    Sulfa Antibiotics Anaphylaxis    Reglan [Metoclopramide] Angioedema    Compazine [Prochlorperazine Edisylate] Hives    Demerol [Meperidine] Hives    Droperidol Itching    Metoclopramide Swelling    Toradol [Ketorolac Tromethamine] Hives and Itching    Toradol [Ketorolac Tromethamine] Hives    Zosyn [Piperacillin Sod-Tazobactam So] Hives        Past Surgical History:     Past Surgical History:   Procedure Laterality Date    ANAL SCOPE  N/A 2016    Procedure: ANAL SCOPE;  Surgeon: Kael Lopez MD;  Location: [facility] OR;  Service:     APPENDECTOMY      COLONOSCOPY N/A 2016    Procedure: COLONOSCOPY  CPTCODE:10334;  Surgeon: Jose Antonio Belle III, MD;  Location: [facility] OR;  Service:     COLONOSCOPY N/A 2016    Procedure: COLONOSCOPY (91724) CPT;  Surgeon: Jose Antonio Belle III, MD;  Location: [facility] OR;  Service:     CYSTOSCOPY RETROGRADE PYELOGRAM Bilateral 2017    Procedure: CYSTOSCOPY RETROGRADE PYELOGRAM;  Surgeon: Mu Blunt MD;  Location: [facility] OR;  Service:     ENDOSCOPY N/A 2016    Procedure: ESOPHAGOGASTRODUODENOSCOPY WITH BIOPSY  CPTCODE:98779;  Surgeon: Jose Antonio Belle III, MD;  Location: [facility] OR;  Service:     HEMORRHOIDECTOMY N/A 2016    Procedure: HEMORRHOID STAPLING;  Surgeon: Kael Lopez MD;  Location: [facility] OR;  Service:     HYSTERECTOMY      KNEE SURGERY      LAPAROSCOPIC SALPINGOOPHERECTOMY      PORTACATH PLACEMENT N/A 2017    Procedure: INSERTION OF PORTACATH;  Surgeon: Celso Arredondo MD;  Location: [facility] OR;  Service:     SHOULDER SURGERY      3 times       Social History:     Social History     Socioeconomic History    Marital status:    Tobacco Use    Smoking status: Former     Packs/day: 1.00     Years: 20.00     Pack years: 20.00     Types: Cigarettes     Quit date: 2015     Years since quittin.8     Passive exposure: Past    Smokeless tobacco: Never   Vaping Use    Vaping Use: Never used   Substance and Sexual Activity    Alcohol use: No    Drug use: Yes     Types: Marijuana     Comment: for pain    Sexual activity: Defer     Birth control/protection: Surgical       Family History:     Family History   Problem Relation Age of Onset    Crohn's disease Other     Hypertension Other     Diabetes Other     Irritable bowel syndrome Other     No Known Problems Father     No Known Problems Mother        Review of Systems:     Review of Systems    Constitutional: Negative.  Negative for activity change, appetite change, chills, diaphoresis, fatigue and unexpected weight change.   HENT:  Negative for congestion, dental problem, drooling, ear discharge, ear pain, facial swelling, hearing loss, mouth sores, nosebleeds, postnasal drip, rhinorrhea, sinus pressure, sneezing, sore throat, tinnitus, trouble swallowing and voice change.    Eyes: Negative.  Negative for photophobia, pain, discharge, redness, itching and visual disturbance.   Respiratory: Negative.  Negative for apnea, cough, choking, chest tightness, shortness of breath, wheezing and stridor.    Cardiovascular: Negative.  Negative for chest pain, palpitations and leg swelling.   Gastrointestinal: Negative.  Negative for abdominal distention, abdominal pain, anal bleeding, blood in stool, constipation, diarrhea, nausea, rectal pain and vomiting.   Endocrine: Negative.  Negative for cold intolerance, heat intolerance, polydipsia, polyphagia and polyuria.   Genitourinary:  Positive for difficulty urinating.   Musculoskeletal: Negative.  Negative for arthralgias, back pain, gait problem, joint swelling, myalgias, neck pain and neck stiffness.   Skin: Negative.  Negative for color change, pallor, rash and wound.   Allergic/Immunologic: Negative.  Negative for environmental allergies, food allergies and immunocompromised state.   Neurological: Negative.  Negative for dizziness, tremors, seizures, syncope, facial asymmetry, speech difficulty, weakness, light-headedness, numbness and headaches.   Hematological: Negative.  Negative for adenopathy. Does not bruise/bleed easily.   Psychiatric/Behavioral:  Negative for agitation, behavioral problems, confusion, decreased concentration, dysphoric mood, hallucinations, self-injury, sleep disturbance and suicidal ideas. The patient is not nervous/anxious and is not hyperactive.    All other systems reviewed and are negative.    Physical Exam:     Physical  Exam  Constitutional:       Appearance: She is well-developed.   HENT:      Head: Normocephalic and atraumatic.      Right Ear: External ear normal.      Left Ear: External ear normal.   Eyes:      Conjunctiva/sclera: Conjunctivae normal.      Pupils: Pupils are equal, round, and reactive to light.   Cardiovascular:      Rate and Rhythm: Normal rate and regular rhythm.      Heart sounds: Normal heart sounds.   Pulmonary:      Effort: Pulmonary effort is normal.      Breath sounds: Normal breath sounds.   Abdominal:      General: Bowel sounds are normal. There is no distension.      Palpations: Abdomen is soft. There is no mass.      Tenderness: There is no abdominal tenderness. There is no guarding or rebound.   Genitourinary:     General: Normal vulva.      Vagina: No vaginal discharge.   Musculoskeletal:         General: Normal range of motion.   Skin:     General: Skin is warm and dry.   Neurological:      Mental Status: She is alert.      Deep Tendon Reflexes: Reflexes are normal and symmetric.   Psychiatric:         Behavior: Behavior normal.         Thought Content: Thought content normal.         Judgment: Judgment normal.       I have reviewed the following portions of the patient's history: Allergies, current medications, past family history, past medical history, past social history, past surgical history, problem list, and ROS and confirm it is accurate.    Recent Image (CT and/or KUB):      CT Abdomen and Pelvis: No results found for this or any previous visit.       CT Stone Protocol: Results for orders placed during the hospital encounter of 03/16/23    CT Abdomen Pelvis Stone Protocol    Narrative  CT ABDOMEN AND PELVIS, NONCONTRAST, 3/16/2023    HISTORY:  48-year-old female in the ED with lower abdomen pain, hematuria.    TECHNIQUE:  CT imaging of the abdomen and pelvis ordered as noncontrast kidney stone protocol exam. Radiation dose reduction techniques included automated exposure control.  Radiation audit for CT and nuclear cardiology exams in the last 12 months: 0.    COMPARISON:  *  CT abdomen/pelvis, 10/3/2022.    ABDOMEN FINDINGS:  Both kidneys, both ureters and urinary bladder are normal in noncontrast CT appearance. No visible radiopaque renal or urinary tract stone material. No evidence of upper urinary tract obstruction.    No gallbladder distention or bile duct dilatation. Liver, pancreas and spleen are normal in size and appearance without contrast. Normal caliber abdominal aorta.    Small bowel and colon are normal in caliber and appearance, as imaged. The appendix is not directly visualized, but there is no indirect CT evidence of acute appendicitis or other active inflammation in the ileocecal region. No gastric distention, hiatal  hernia or distal esophageal dilatation.    PELVIS FINDINGS:  Hysterectomy. Urinary bladder and rectum are within normal limits. No free pelvic fluid. No inguinal hernia.    Lung base images show no active disease. There is some cylindrical bronchiectasis involving the posterior segment right lower lobe. Fluid opacification of one of the bronchiectatic segments caused a nodular appearance on prior imaging, but this has  resolved today. There is air trapping within this segment.    Mild chronic superior endplate vertebral compression fracture deformity is incidentally noted at L2, unchanged.    Impression  1. No acute abnormality within the abdomen or pelvis.  2. No visible nephrolithiasis or evidence of urinary obstruction.  3. Mild chronic bronchiectasis in the posterior right lower lobe.  4. Mild chronic fracture deformity of the L2 vertebral body.    Signer Name: Sang Painting MD  Signed: 3/16/2023 6:02 PM  Workstation Name: [facility]  [facility]       KUB: Results for orders placed during the hospital encounter of 01/10/23    XR Abdomen KUB    Narrative  EXAM:  XR Abdomen, 1 View    EXAM DATE:  1/10/2023 12:05 PM    CLINICAL  HISTORY:  flank pain    TECHNIQUE:  Frontal supine view of the abdomen/pelvis.    COMPARISON:  10/01/2022    FINDINGS:  Gastrointestinal tract:  Unremarkable.  No dilation.  Organs:  No stones identified along the expected course of ureters.  Bones/joints:  Unremarkable.  Soft tissues:  Surgical staples in the lower pelvis.  Vasculature:  Stable phleboliths lower pelvis.    Impression  1.  No acute findings radiographically evident.  2.  No radiographic evidence of renal or ureteral stones.    This report was finalized on 1/10/2023 12:47 PM by Dr. Ankit Naik MD.       Labs (past 3 months):      No visits with results within 3 Month(s) from this visit.   Latest known visit with results is:   Admission on 04/19/2023, Discharged on 04/19/2023   Component Date Value Ref Range Status    Color, UA 04/19/2023 Red (A)  Yellow, Straw Final    Appearance, UA 04/19/2023 Cloudy (A)  Clear Final    pH, UA 04/19/2023 6.0  5.0 - 8.0 Final    Specific Gravity, UA 04/19/2023 1.012  1.005 - 1.030 Final    Glucose, UA 04/19/2023 Negative  Negative Final    Ketones, UA 04/19/2023 Negative  Negative Final    Bilirubin, UA 04/19/2023 Negative  Negative Final    Blood, UA 04/19/2023 Large (3+) (A)  Negative Final    Protein, UA 04/19/2023 30 mg/dL (1+) (A)  Negative Final    Leuk Esterase, UA 04/19/2023 Small (1+) (A)  Negative Final    Nitrite, UA 04/19/2023 Negative  Negative Final    Urobilinogen, UA 04/19/2023 0.2 E.U./dL  0.2 - 1.0 E.U./dL Final    RBC, UA 04/19/2023 Too Numerous to Count (A)  None Seen, 0-2 /HPF Final    WBC, UA 04/19/2023 3-5 (A)  None Seen, 0-2 /HPF Final    Bacteria, UA 04/19/2023 None Seen  None Seen /HPF Final    Squamous Epithelial Cells, UA 04/19/2023 0-2  None Seen, 0-2 /HPF Final    Hyaline Casts, UA 04/19/2023 3-6  None Seen /LPF Final    Methodology 04/19/2023 Automated Microscopy   Final        Procedure:   Urethral dilation-after an appropriate informed consent, the patient was brought to the  procedure suite.  The urethra was gently anesthetized with 10 mL of 2% viscous Xylocaine jelly.  After an adequate period of topical anesthesia I went ahead and  dilated with Reese sounds from 16 to 26 Maced[place] sequentially without complication. The patient was given gentamicin as prophylaxis with 80 mg.    Assessment/Plan:   Urethral stricture-posttraumatic  Narcotic pain medication-patient has significant acute pain that I believe would be an indication for the use of narcotic pain medication.  I discussed the significant risks of pain medication and the fact that this will be a short only option and I will give her no more than a three-day supply of pain medication, I will not plan long-term medication, and that this will be sent to a pain clinic if it at all becomes necessary.  We discussed signing a pain medication agreement and the fact that we're going to run a state [place] review to be sure the patient is not getting pain medication from elsewhere.  If this is the case, we will not give pain medication as part of the patient's treatment plan of there being prescribed a controlled substance with potential for abuse.  This patient has been well aware of the appropriate dose of such medications including the risks for somnolence, limited ability to drive and/or safety and the significant potential for overdose.  It has been made clear that these medications are for the prescribed patient only without concomitant use of alcohol or other substance unless prescribed by the medical provider.  Has completed prescribing agreement detailing the terms of continue prescribing him a controlled substance including monitoring Luis Alberto reports, the possibility of urine drug screens, and pill counts.  The patient is aware that we review [place]BERTO reports on a regular basis and scan them into the chart.  History and physical examination exhibited continued safe and appropriate use of controlled substances. We also discussed the  fact that the new [place] legislation allows only a three-day prescription for pain medication.  In this situation he will be referred to a chronic pain clinic.          This document has been electronically signed by VERENICE FINK MD August 22, 2023 09:04 EDT    Dictated Utilizing Dragon Dictation: Part of this note may be an electronic transcription/translation of spoken language to printed text using the Dragon Dictation System.

## 2023-08-26 ENCOUNTER — HOSPITAL ENCOUNTER (EMERGENCY)
Facility: HOSPITAL | Age: 48
Discharge: HOME | End: 2023-08-26
Attending: FAMILY MEDICINE
Payer: COMMERCIAL

## 2023-08-26 VITALS
BODY MASS INDEX: 17.43 KG/M2 | DIASTOLIC BLOOD PRESSURE: 73 MMHG | HEART RATE: 81 BPM | WEIGHT: 115 LBS | RESPIRATION RATE: 18 BRPM | OXYGEN SATURATION: 99 % | TEMPERATURE: 98.7 F | SYSTOLIC BLOOD PRESSURE: 106 MMHG | HEIGHT: 68 IN

## 2023-08-26 DIAGNOSIS — G43.909 MIGRAINE WITHOUT STATUS MIGRAINOSUS, NOT INTRACTABLE, UNSPECIFIED MIGRAINE TYPE: Primary | ICD-10-CM

## 2023-08-26 LAB
ALBUMIN SERPL-MCNC: 4.1 G/DL (ref 3.5–5.2)
ALBUMIN/GLOB SERPL: 1.7 G/DL
ALP SERPL-CCNC: 62 U/L (ref 39–117)
ALT SERPL W P-5'-P-CCNC: 36 U/L (ref 1–33)
ANION GAP SERPL CALCULATED.3IONS-SCNC: 10.4 MMOL/L (ref 5–15)
AST SERPL-CCNC: 29 U/L (ref 1–32)
BASOPHILS # BLD AUTO: 0.01 10*3/MM3 (ref 0–0.2)
BASOPHILS NFR BLD AUTO: 0.2 % (ref 0–1.5)
BILIRUB SERPL-MCNC: 0.6 MG/DL (ref 0–1.2)
BUN SERPL-MCNC: 7 MG/DL (ref 6–20)
BUN/CREAT SERPL: 14 (ref 7–25)
CALCIUM SPEC-SCNC: 9 MG/DL (ref 8.6–10.5)
CHLORIDE SERPL-SCNC: 106 MMOL/L (ref 98–107)
CO2 SERPL-SCNC: 23.6 MMOL/L (ref 22–29)
CREAT SERPL-MCNC: 0.5 MG/DL (ref 0.57–1)
CRP SERPL-MCNC: <0.3 MG/DL (ref 0–0.5)
DEPRECATED RDW RBC AUTO: 43.7 FL (ref 37–54)
EGFRCR SERPLBLD CKD-EPI 2021: 115.9 ML/MIN/1.73
EOSINOPHIL # BLD AUTO: 0.15 10*3/MM3 (ref 0–0.4)
EOSINOPHIL NFR BLD AUTO: 2.8 % (ref 0.3–6.2)
ERYTHROCYTE [DISTWIDTH] IN BLOOD BY AUTOMATED COUNT: 13 % (ref 12.3–15.4)
GLOBULIN UR ELPH-MCNC: 2.4 GM/DL
GLUCOSE SERPL-MCNC: 74 MG/DL (ref 65–99)
HCT VFR BLD AUTO: 46.2 % (ref 34–46.6)
HGB BLD-MCNC: 15.3 G/DL (ref 12–15.9)
HOLD SPECIMEN: NORMAL
HOLD SPECIMEN: NORMAL
IMM GRANULOCYTES # BLD AUTO: 0.01 10*3/MM3 (ref 0–0.05)
IMM GRANULOCYTES NFR BLD AUTO: 0.2 % (ref 0–0.5)
INR PPP: 0.97 (ref 0.9–1.1)
LYMPHOCYTES # BLD AUTO: 2.34 10*3/MM3 (ref 0.7–3.1)
LYMPHOCYTES NFR BLD AUTO: 44 % (ref 19.6–45.3)
MCH RBC QN AUTO: 30.4 PG (ref 26.6–33)
MCHC RBC AUTO-ENTMCNC: 33.1 G/DL (ref 31.5–35.7)
MCV RBC AUTO: 91.7 FL (ref 79–97)
MONOCYTES # BLD AUTO: 0.42 10*3/MM3 (ref 0.1–0.9)
MONOCYTES NFR BLD AUTO: 7.9 % (ref 5–12)
NEUTROPHILS NFR BLD AUTO: 2.39 10*3/MM3 (ref 1.7–7)
NEUTROPHILS NFR BLD AUTO: 44.9 % (ref 42.7–76)
NRBC BLD AUTO-RTO: 0 /100 WBC (ref 0–0.2)
PLATELET # BLD AUTO: 150 10*3/MM3 (ref 140–450)
PMV BLD AUTO: 10.3 FL (ref 6–12)
POTASSIUM SERPL-SCNC: 3.9 MMOL/L (ref 3.5–5.2)
PROT SERPL-MCNC: 6.5 G/DL (ref 6–8.5)
PROTHROMBIN TIME: 13.4 SECONDS (ref 12.1–14.7)
RBC # BLD AUTO: 5.04 10*6/MM3 (ref 3.77–5.28)
SODIUM SERPL-SCNC: 140 MMOL/L (ref 136–145)
WBC NRBC COR # BLD: 5.32 10*3/MM3 (ref 3.4–10.8)
WHOLE BLOOD HOLD COAG: NORMAL
WHOLE BLOOD HOLD SPECIMEN: NORMAL

## 2023-08-26 PROCEDURE — 96375 TX/PRO/DX INJ NEW DRUG ADDON: CPT

## 2023-08-26 PROCEDURE — 25010000002 MORPHINE PER 10 MG: Performed by: FAMILY MEDICINE

## 2023-08-26 PROCEDURE — 85610 PROTHROMBIN TIME: CPT | Performed by: FAMILY MEDICINE

## 2023-08-26 PROCEDURE — 85025 COMPLETE CBC W/AUTO DIFF WBC: CPT | Performed by: FAMILY MEDICINE

## 2023-08-26 PROCEDURE — 96374 THER/PROPH/DIAG INJ IV PUSH: CPT

## 2023-08-26 PROCEDURE — 93005 ELECTROCARDIOGRAM TRACING: CPT | Performed by: FAMILY MEDICINE

## 2023-08-26 PROCEDURE — 36415 COLL VENOUS BLD VENIPUNCTURE: CPT

## 2023-08-26 PROCEDURE — 80053 COMPREHEN METABOLIC PANEL: CPT | Performed by: FAMILY MEDICINE

## 2023-08-26 PROCEDURE — 99283 EMERGENCY DEPT VISIT LOW MDM: CPT

## 2023-08-26 PROCEDURE — 96376 TX/PRO/DX INJ SAME DRUG ADON: CPT

## 2023-08-26 PROCEDURE — 25010000002 ONDANSETRON PER 1 MG: Performed by: FAMILY MEDICINE

## 2023-08-26 PROCEDURE — 86140 C-REACTIVE PROTEIN: CPT | Performed by: FAMILY MEDICINE

## 2023-08-26 PROCEDURE — 25010000002 DEXAMETHASONE SODIUM PHOSPHATE 10 MG/ML SOLUTION: Performed by: FAMILY MEDICINE

## 2023-08-26 PROCEDURE — 25010000002 DIPHENHYDRAMINE PER 50 MG: Performed by: FAMILY MEDICINE

## 2023-08-26 RX ORDER — DIPHENHYDRAMINE HYDROCHLORIDE 50 MG/ML
25 INJECTION INTRAMUSCULAR; INTRAVENOUS ONCE
Status: COMPLETED | OUTPATIENT
Start: 2023-08-26 | End: 2023-08-26

## 2023-08-26 RX ORDER — SODIUM CHLORIDE 0.9 % (FLUSH) 0.9 %
10 SYRINGE (ML) INJECTION AS NEEDED
Status: DISCONTINUED | OUTPATIENT
Start: 2023-08-26 | End: 2023-08-26 | Stop reason: HOSPADM

## 2023-08-26 RX ORDER — ONDANSETRON 2 MG/ML
4 INJECTION INTRAMUSCULAR; INTRAVENOUS ONCE
Status: COMPLETED | OUTPATIENT
Start: 2023-08-26 | End: 2023-08-26

## 2023-08-26 RX ORDER — DEXAMETHASONE SODIUM PHOSPHATE 10 MG/ML
4 INJECTION, SOLUTION INTRAMUSCULAR; INTRAVENOUS ONCE
Status: COMPLETED | OUTPATIENT
Start: 2023-08-26 | End: 2023-08-26

## 2023-08-26 RX ADMIN — SODIUM CHLORIDE 1000 ML: 9 INJECTION, SOLUTION INTRAVENOUS at 20:42

## 2023-08-26 RX ADMIN — MORPHINE SULFATE 4 MG: 4 INJECTION, SOLUTION INTRAMUSCULAR; INTRAVENOUS at 21:42

## 2023-08-26 RX ADMIN — DEXAMETHASONE SODIUM PHOSPHATE 4 MG: 10 INJECTION INTRAMUSCULAR; INTRAVENOUS at 20:40

## 2023-08-26 RX ADMIN — MORPHINE SULFATE 4 MG: 4 INJECTION, SOLUTION INTRAMUSCULAR; INTRAVENOUS at 20:41

## 2023-08-26 RX ADMIN — DIPHENHYDRAMINE HYDROCHLORIDE 25 MG: 50 INJECTION, SOLUTION INTRAMUSCULAR; INTRAVENOUS at 20:41

## 2023-08-26 RX ADMIN — ONDANSETRON 4 MG: 2 INJECTION INTRAMUSCULAR; INTRAVENOUS at 20:41

## 2023-08-26 NOTE — ED PROVIDER NOTES
Subjective   History of Present Illness  48-year-old with history of migraines fibromyalgia presents emergency room complaints of headache.  Patient reports she developed a migraine for the past 3 days.  Her headache is frontal and then radiates diffusely she has had similar symptoms in the past.  Headache is similar to previous migraines.  She has been taking Imitrex and Phenergan at home without improvement of symptoms.  She states she had multiple episodes of vomiting.  She states she feels dehydrated.  She denies weakness or numbness.  She states her headaches made worse with light.    Headache  Pain location:  Generalized  Quality:  Sharp  Duration:  3 days  Timing:  Constant  Chronicity:  New  Relieved by:  Nothing  Worsened by:  Light  Associated symptoms: nausea and vomiting    Associated symptoms: no abdominal pain, no back pain, no congestion, no cough, no diarrhea, no dizziness, no fever, no near-syncope, no neck pain, no neck stiffness, no numbness, no paresthesias, no syncope, no tingling, no URI and no visual change      Review of Systems   Constitutional:  Negative for fever.   HENT:  Negative for congestion.    Respiratory:  Negative for cough.    Cardiovascular:  Negative for syncope and near-syncope.   Gastrointestinal:  Positive for nausea and vomiting. Negative for abdominal pain and diarrhea.   Musculoskeletal:  Negative for back pain, neck pain and neck stiffness.   Neurological:  Positive for headaches. Negative for dizziness, numbness and paresthesias.   All other systems reviewed and are negative.    Past Medical History:   Diagnosis Date    Hoyos's disease     Bipolar 1 disorder     Constipation     DDD (degenerative disc disease), cervical 05/29/2017    Fibromyalgia     IBS (irritable bowel syndrome)     Meningioma     Migraine     PONV (postoperative nausea and vomiting)     PTSD (post-traumatic stress disorder)     Rectal bleeding        Allergies   Allergen Reactions    Ativan  [Lorazepam] Hallucinations     confusion    Sulfa Antibiotics Shortness Of Breath and Swelling    Sulfa Antibiotics Anaphylaxis    Reglan [Metoclopramide] Angioedema    Compazine [Prochlorperazine Edisylate] Hives    Demerol [Meperidine] Hives    Droperidol Itching    Metoclopramide Swelling    Toradol [Ketorolac Tromethamine] Hives and Itching    Toradol [Ketorolac Tromethamine] Hives    Zosyn [Piperacillin Sod-Tazobactam So] Hives       Past Surgical History:   Procedure Laterality Date    ANAL SCOPE N/A 7/28/2016    Procedure: ANAL SCOPE;  Surgeon: Kael Lopez MD;  Location: [facility] OR;  Service:     APPENDECTOMY      COLONOSCOPY N/A 6/30/2016    Procedure: COLONOSCOPY  CPTCODE:72989;  Surgeon: Jose Antonio Belle III, MD;  Location: [facility] OR;  Service:     COLONOSCOPY N/A 7/7/2016    Procedure: COLONOSCOPY (24533) CPT;  Surgeon: Jose Antonio Belle III, MD;  Location: [facility] OR;  Service:     CYSTOSCOPY RETROGRADE PYELOGRAM Bilateral 4/28/2017    Procedure: CYSTOSCOPY RETROGRADE PYELOGRAM;  Surgeon: Mu Blunt MD;  Location: [facility] OR;  Service:     ENDOSCOPY N/A 6/30/2016    Procedure: ESOPHAGOGASTRODUODENOSCOPY WITH BIOPSY  CPTCODE:74252;  Surgeon: Jose Antonio Belle III, MD;  Location: [facility] OR;  Service:     HEMORRHOIDECTOMY N/A 7/28/2016    Procedure: HEMORRHOID STAPLING;  Surgeon: Kael Lopez MD;  Location: [facility] OR;  Service:     HYSTERECTOMY      KNEE SURGERY      LAPAROSCOPIC SALPINGOOPHERECTOMY      PORTACATH PLACEMENT N/A 7/28/2017    Procedure: INSERTION OF PORTACATH;  Surgeon: Celso Arredondo MD;  Location: [facility] OR;  Service:     SHOULDER SURGERY      3 times       Family History   Problem Relation Age of Onset    Crohn's disease Other     Hypertension Other     Diabetes Other     Irritable bowel syndrome Other     No Known Problems Father     No Known Problems Mother        Social History     Socioeconomic History    Marital status:    Tobacco Use    Smoking  status: Former     Packs/day: 1.00     Years: 20.00     Pack years: 20.00     Types: Cigarettes     Quit date: 2015     Years since quittin.8     Passive exposure: Past    Smokeless tobacco: Never   Vaping Use    Vaping Use: Never used   Substance and Sexual Activity    Alcohol use: No    Drug use: Yes     Types: Marijuana     Comment: for pain    Sexual activity: Defer     Birth control/protection: Surgical           Objective   Physical Exam  Vitals and nursing note reviewed.   HENT:      Head: Normocephalic and atraumatic.      Nose: Nose normal.      Mouth/Throat:      Mouth: Mucous membranes are moist.      Pharynx: Oropharynx is clear.   Eyes:      Extraocular Movements: Extraocular movements intact.      Pupils: Pupils are equal, round, and reactive to light.      Comments: No nystagmus.  No gaze palsy.   Neck:      Comments: No nuchal rigidity.  Cardiovascular:      Rate and Rhythm: Normal rate and regular rhythm.   Pulmonary:      Effort: Pulmonary effort is normal.      Breath sounds: Normal breath sounds.      Comments: No rhonchi's rales or wheezes.  Abdominal:      General: Bowel sounds are normal.      Palpations: Abdomen is soft.      Tenderness: There is no abdominal tenderness. There is no guarding.   Musculoskeletal:         General: Normal range of motion.      Cervical back: Neck supple.   Skin:     General: Skin is warm and dry.   Neurological:      General: No focal deficit present.      Mental Status: She is alert and oriented to person, place, and time.      Cranial Nerves: No cranial nerve deficit.      Sensory: No sensory deficit.   Psychiatric:         Mood and Affect: Mood normal.       Procedures           ED Course  ED Course as of 23 2131   Sat Aug 26, 2023   1958 Patient without focal neurological deficits.  Migraine is similar to previous migraine.  We will give dexamethasone and Benadryl. [BB]    EKG normal sinus rhythm ventricular 96 parable of 2 QRS 62 QTc 462  no ST elevation. [BB]   2036 CBC is unremarkable [BB]   2043 Coags unremarkable [BB]   2112 CRP CMP unremarkable. [BB]   2130 Patient's labs unremarkable.  Patient neuro exam is unremarkable as well.  Patient given Decadron Zofran morphine Benadryl with improvement of headache.  Patient is went from 8-4.  She expressed desire to go home.  Patient informed to follow-up with her family doctor as well as warning signs symptoms when to emergency room [BB]      ED Course User Index  [BB] Earnest Renae MD                                           Medical Decision Making  48-year-old with history of migraines fibromyalgia presents emergency room complaints of headache. Patient reports she developed a migraine for the past 3 days. Her headache is frontal and then radiates diffusely she has had similar symptoms in the past. Headache is similar to previous migraines. She has been taking Imitrex and Phenergan at home without improvement of symptoms. She states she had multiple episodes of vomiting. She states she feels dehydrated. She denies weakness or numbness. She states her headaches made worse with light.    Problems Addressed:  Migraine without status migrainosus, not intractable, unspecified migraine type: complicated acute illness or injury    Amount and/or Complexity of Data Reviewed  External Data Reviewed: labs.  Labs: ordered.  ECG/medicine tests: ordered.  Discussion of management or test interpretation with external provider(s): No radiology results for the last day      Risk  Prescription drug management.        Final diagnoses:   Migraine without status migrainosus, not intractable, unspecified migraine type       ED Disposition  ED Disposition       ED Disposition   Discharge    Condition   Stable    Comment   --               Mabel Patterson, APRN  [address] [address]  [phone]    In 2 days           Medication List      No changes were made to your prescriptions during  this visit.            Earnest Renae MD  08/26/23 6507

## 2023-08-27 LAB
QT INTERVAL: 366 MS
QTC INTERVAL: 462 MS

## 2023-08-27 NOTE — ED NOTES
Right subclavian single lumen Implantable Port access at this time per patient's request by Juliana CHAWLA.

## 2023-09-15 ENCOUNTER — HOSPITAL ENCOUNTER (EMERGENCY)
Facility: HOSPITAL | Age: 48
Discharge: HOME | End: 2023-09-15
Attending: EMERGENCY MEDICINE
Payer: COMMERCIAL

## 2023-09-15 VITALS
DIASTOLIC BLOOD PRESSURE: 78 MMHG | SYSTOLIC BLOOD PRESSURE: 130 MMHG | TEMPERATURE: 98.1 F | BODY MASS INDEX: 17.43 KG/M2 | RESPIRATION RATE: 17 BRPM | HEART RATE: 89 BPM | WEIGHT: 115 LBS | HEIGHT: 68 IN | OXYGEN SATURATION: 99 %

## 2023-09-15 DIAGNOSIS — G43.809 OTHER MIGRAINE WITHOUT STATUS MIGRAINOSUS, NOT INTRACTABLE: Primary | ICD-10-CM

## 2023-09-15 PROCEDURE — 99283 EMERGENCY DEPT VISIT LOW MDM: CPT

## 2023-09-15 PROCEDURE — 25010000002 DEXAMETHASONE SODIUM PHOSPHATE 10 MG/ML SOLUTION: Performed by: NURSE PRACTITIONER

## 2023-09-15 PROCEDURE — 25010000002 HYDROMORPHONE PER 4 MG: Performed by: NURSE PRACTITIONER

## 2023-09-15 PROCEDURE — 63710000001 ONDANSETRON ODT 4 MG TABLET DISPERSIBLE: Performed by: NURSE PRACTITIONER

## 2023-09-15 PROCEDURE — 25010000002 DIPHENHYDRAMINE PER 50 MG: Performed by: NURSE PRACTITIONER

## 2023-09-15 PROCEDURE — 96372 THER/PROPH/DIAG INJ SC/IM: CPT

## 2023-09-15 PROCEDURE — 25010000002 HYDROMORPHONE 1 MG/ML SOLUTION: Performed by: NURSE PRACTITIONER

## 2023-09-15 RX ORDER — DIPHENHYDRAMINE HYDROCHLORIDE 50 MG/ML
50 INJECTION INTRAMUSCULAR; INTRAVENOUS ONCE
Status: COMPLETED | OUTPATIENT
Start: 2023-09-15 | End: 2023-09-15

## 2023-09-15 RX ORDER — DEXAMETHASONE SODIUM PHOSPHATE 10 MG/ML
10 INJECTION, SOLUTION INTRAMUSCULAR; INTRAVENOUS ONCE
Status: COMPLETED | OUTPATIENT
Start: 2023-09-15 | End: 2023-09-15

## 2023-09-15 RX ORDER — ONDANSETRON 4 MG/1
4 TABLET, ORALLY DISINTEGRATING ORAL ONCE
Status: COMPLETED | OUTPATIENT
Start: 2023-09-15 | End: 2023-09-15

## 2023-09-15 RX ORDER — SODIUM CHLORIDE 0.9 % (FLUSH) 0.9 %
10 SYRINGE (ML) INJECTION AS NEEDED
Status: DISCONTINUED | OUTPATIENT
Start: 2023-09-15 | End: 2023-09-15

## 2023-09-15 RX ORDER — HYDROMORPHONE HYDROCHLORIDE 1 MG/ML
0.5 INJECTION, SOLUTION INTRAMUSCULAR; INTRAVENOUS; SUBCUTANEOUS ONCE
Status: COMPLETED | OUTPATIENT
Start: 2023-09-15 | End: 2023-09-15

## 2023-09-15 RX ADMIN — HYDROMORPHONE HYDROCHLORIDE 1 MG: 1 INJECTION, SOLUTION INTRAMUSCULAR; INTRAVENOUS; SUBCUTANEOUS at 11:54

## 2023-09-15 RX ADMIN — DIPHENHYDRAMINE HYDROCHLORIDE 50 MG: 50 INJECTION INTRAMUSCULAR; INTRAVENOUS at 12:21

## 2023-09-15 RX ADMIN — ONDANSETRON 4 MG: 4 TABLET, ORALLY DISINTEGRATING ORAL at 11:54

## 2023-09-15 RX ADMIN — HYDROMORPHONE HYDROCHLORIDE 0.5 MG: 1 INJECTION, SOLUTION INTRAMUSCULAR; INTRAVENOUS; SUBCUTANEOUS at 13:04

## 2023-09-15 RX ADMIN — DEXAMETHASONE SODIUM PHOSPHATE 10 MG: 10 INJECTION INTRAMUSCULAR; INTRAVENOUS at 11:54

## 2023-09-15 NOTE — ED PROVIDER NOTES
Subjective   History of Present Illness  Patient is a 48-year-old female with past medical history significant for migraine, fibromyalgia, chronic pain, bipolar 1 disorder.  She presents the ED today with a migraine that she has had for 7 days.  She reports she has tried her normal prescribed medications with no relief.  She states she has been under some stress with her grandchild being sick. Denies any other complaints.      Review of Systems   Constitutional: Negative.  Negative for fever.   Eyes: Negative.    Respiratory: Negative.     Cardiovascular: Negative.  Negative for chest pain.   Gastrointestinal:  Positive for nausea and vomiting. Negative for abdominal pain.   Endocrine: Negative.    Genitourinary: Negative.  Negative for dysuria.   Skin: Negative.    Allergic/Immunologic: Negative.    Neurological:  Positive for headaches.   Psychiatric/Behavioral: Negative.     All other systems reviewed and are negative.    Past Medical History:   Diagnosis Date    Hoyos's disease     Bipolar 1 disorder     Constipation     DDD (degenerative disc disease), cervical 05/29/2017    Fibromyalgia     IBS (irritable bowel syndrome)     Meningioma     Migraine     PONV (postoperative nausea and vomiting)     PTSD (post-traumatic stress disorder)     Rectal bleeding        Allergies   Allergen Reactions    Ativan [Lorazepam] Hallucinations     confusion    Sulfa Antibiotics Shortness Of Breath and Swelling    Sulfa Antibiotics Anaphylaxis    Reglan [Metoclopramide] Angioedema    Compazine [Prochlorperazine Edisylate] Hives    Demerol [Meperidine] Hives    Droperidol Itching    Metoclopramide Swelling    Toradol [Ketorolac Tromethamine] Hives and Itching    Toradol [Ketorolac Tromethamine] Hives    Zosyn [Piperacillin Sod-Tazobactam So] Hives       Past Surgical History:   Procedure Laterality Date    ANAL SCOPE N/A 7/28/2016    Procedure: ANAL SCOPE;  Surgeon: Kael Lopez MD;  Location: [facility];  Service:      APPENDECTOMY      COLONOSCOPY N/A 2016    Procedure: COLONOSCOPY  CPTCODE:56830;  Surgeon: Jose Antonio Belle III, MD;  [facility];  Service:     COLONOSCOPY N/A 2016    Procedure: COLONOSCOPY (77416) CPT;  Surgeon: Jose Antonio Belle III, MD;  [facility];  Service:     CYSTOSCOPY RETROGRADE PYELOGRAM Bilateral 2017    Procedure: CYSTOSCOPY RETROGRADE PYELOGRAM;  Surgeon: Mu Blunt MD;  [facility];  Service:     ENDOSCOPY N/A 2016    Procedure: ESOPHAGOGASTRODUODENOSCOPY WITH BIOPSY  CPTCODE:16805;  Surgeon: Jose Antonio Belle III, MD;  [facility];  Service:     HEMORRHOIDECTOMY N/A 2016    Procedure: HEMORRHOID STAPLING;  Surgeon: Kael Lopez MD;  [facility];  Service:     HYSTERECTOMY      KNEE SURGERY      LAPAROSCOPIC SALPINGOOPHERECTOMY      PORTACATH PLACEMENT N/A 2017    Procedure: INSERTION OF PORTACATH;  Surgeon: Celso Arredondo MD;  [facility];  Service:     SHOULDER SURGERY      3 times       Family History   Problem Relation Age of Onset    Crohn's disease Other     Hypertension Other     Diabetes Other     Irritable bowel syndrome Other     No Known Problems Father     No Known Problems Mother        Social History     Socioeconomic History    Marital status:    Tobacco Use    Smoking status: Former     Packs/day: 1.00     Years: 20.00     Pack years: 20.00     Types: Cigarettes     Quit date: 2015     Years since quittin.8     Passive exposure: Past    Smokeless tobacco: Never   Vaping Use    Vaping Use: Never used   Substance and Sexual Activity    Alcohol use: No    Drug use: Yes     Types: Marijuana     Comment: for pain    Sexual activity: Defer     Birth control/protection: Surgical           Objective   Physical Exam  Vitals and nursing note reviewed.   Constitutional:       General: She is not in acute distress.     Appearance: She is well-developed. She is not diaphoretic.    HENT:      Head: Normocephalic and atraumatic.      Right Ear: External ear normal.      Left Ear: External ear normal.      Nose: Nose normal.   Eyes:      Conjunctiva/sclera: Conjunctivae normal.      Pupils: Pupils are equal, round, and reactive to light.   Neck:      Vascular: No JVD.      Trachea: No tracheal deviation.   Cardiovascular:      Rate and Rhythm: Normal rate and regular rhythm.      Heart sounds: Normal heart sounds. No murmur heard.  Pulmonary:      Effort: Pulmonary effort is normal. No respiratory distress.      Breath sounds: Normal breath sounds. No wheezing.   Abdominal:      General: Bowel sounds are normal.      Palpations: Abdomen is soft.      Tenderness: There is no abdominal tenderness.   Musculoskeletal:         General: No deformity. Normal range of motion.      Cervical back: Normal range of motion and neck supple.   Skin:     General: Skin is warm and dry.      Capillary Refill: Capillary refill takes less than 2 seconds.      Coloration: Skin is not pale.      Findings: No erythema or rash.   Neurological:      General: No focal deficit present.      Mental Status: She is alert and oriented to person, place, and time.      Cranial Nerves: No cranial nerve deficit.   Psychiatric:         Mood and Affect: Mood normal.         Behavior: Behavior normal.         Thought Content: Thought content normal.         Judgment: Judgment normal.       Procedures           ED Course                                           Medical Decision Making  Problems Addressed:  Other migraine without status migrainosus, not intractable: complicated acute illness or injury    Risk  Prescription drug management.        Final diagnoses:   Other migraine without status migrainosus, not intractable       ED Disposition  ED Disposition       ED Disposition   Discharge    Condition   Stable    Comment   --               Mabel Patterson, APRN  [address]  [facility] [address]  [place]  [address]  [phone]    Call in 2 days           Medication List      No changes were made to your prescriptions during this visit.            Tami Bianchi, MIGDALIA  09/15/23 1419       Tami Bianchi, MIGDALIA  09/15/23 1424

## 2023-09-17 ENCOUNTER — HOSPITAL ENCOUNTER (EMERGENCY)
Facility: HOSPITAL | Age: 48
Discharge: HOME | End: 2023-09-17
Attending: EMERGENCY MEDICINE | Admitting: EMERGENCY MEDICINE
Payer: COMMERCIAL

## 2023-09-17 VITALS
TEMPERATURE: 98.4 F | HEART RATE: 72 BPM | HEIGHT: 68 IN | WEIGHT: 116 LBS | DIASTOLIC BLOOD PRESSURE: 68 MMHG | BODY MASS INDEX: 17.58 KG/M2 | OXYGEN SATURATION: 97 % | RESPIRATION RATE: 18 BRPM | SYSTOLIC BLOOD PRESSURE: 106 MMHG

## 2023-09-17 DIAGNOSIS — G43.019 INTRACTABLE MIGRAINE WITHOUT AURA AND WITHOUT STATUS MIGRAINOSUS: Primary | ICD-10-CM

## 2023-09-17 LAB
ALBUMIN SERPL-MCNC: 3.7 G/DL (ref 3.5–5.2)
ALBUMIN/GLOB SERPL: 1.6 G/DL
ALP SERPL-CCNC: 53 U/L (ref 39–117)
ALT SERPL W P-5'-P-CCNC: 17 U/L (ref 1–33)
AMPHET+METHAMPHET UR QL: NEGATIVE
AMPHETAMINES UR QL: NEGATIVE
ANION GAP SERPL CALCULATED.3IONS-SCNC: 6.8 MMOL/L (ref 5–15)
AST SERPL-CCNC: 20 U/L (ref 1–32)
BACTERIA UR QL AUTO: ABNORMAL /HPF
BARBITURATES UR QL SCN: NEGATIVE
BASOPHILS # BLD AUTO: 0.01 10*3/MM3 (ref 0–0.2)
BASOPHILS NFR BLD AUTO: 0.2 % (ref 0–1.5)
BENZODIAZ UR QL SCN: NEGATIVE
BILIRUB SERPL-MCNC: 0.2 MG/DL (ref 0–1.2)
BILIRUB UR QL STRIP: NEGATIVE
BUN SERPL-MCNC: 5 MG/DL (ref 6–20)
BUN/CREAT SERPL: 8.6 (ref 7–25)
BUPRENORPHINE SERPL-MCNC: NEGATIVE NG/ML
CALCIUM SPEC-SCNC: 8.4 MG/DL (ref 8.6–10.5)
CANNABINOIDS SERPL QL: POSITIVE
CHLORIDE SERPL-SCNC: 110 MMOL/L (ref 98–107)
CLARITY UR: ABNORMAL
CO2 SERPL-SCNC: 22.2 MMOL/L (ref 22–29)
COCAINE UR QL: NEGATIVE
COLOR UR: ABNORMAL
CREAT SERPL-MCNC: 0.58 MG/DL (ref 0.57–1)
DEPRECATED RDW RBC AUTO: 44.2 FL (ref 37–54)
EGFRCR SERPLBLD CKD-EPI 2021: 111.8 ML/MIN/1.73
EOSINOPHIL # BLD AUTO: 0.05 10*3/MM3 (ref 0–0.4)
EOSINOPHIL NFR BLD AUTO: 1 % (ref 0.3–6.2)
ERYTHROCYTE [DISTWIDTH] IN BLOOD BY AUTOMATED COUNT: 13 % (ref 12.3–15.4)
FENTANYL UR-MCNC: NEGATIVE NG/ML
GLOBULIN UR ELPH-MCNC: 2.3 GM/DL
GLUCOSE SERPL-MCNC: 92 MG/DL (ref 65–99)
GLUCOSE UR STRIP-MCNC: NEGATIVE MG/DL
HCT VFR BLD AUTO: 41.7 % (ref 34–46.6)
HGB BLD-MCNC: 13.7 G/DL (ref 12–15.9)
HGB UR QL STRIP.AUTO: ABNORMAL
HYALINE CASTS UR QL AUTO: ABNORMAL /LPF
IMM GRANULOCYTES # BLD AUTO: 0.01 10*3/MM3 (ref 0–0.05)
IMM GRANULOCYTES NFR BLD AUTO: 0.2 % (ref 0–0.5)
KETONES UR QL STRIP: NEGATIVE
LEUKOCYTE ESTERASE UR QL STRIP.AUTO: ABNORMAL
LYMPHOCYTES # BLD AUTO: 1.54 10*3/MM3 (ref 0.7–3.1)
LYMPHOCYTES NFR BLD AUTO: 32 % (ref 19.6–45.3)
MAGNESIUM SERPL-MCNC: 1.8 MG/DL (ref 1.6–2.6)
MCH RBC QN AUTO: 30.8 PG (ref 26.6–33)
MCHC RBC AUTO-ENTMCNC: 32.9 G/DL (ref 31.5–35.7)
MCV RBC AUTO: 93.7 FL (ref 79–97)
METHADONE UR QL SCN: NEGATIVE
MONOCYTES # BLD AUTO: 0.16 10*3/MM3 (ref 0.1–0.9)
MONOCYTES NFR BLD AUTO: 3.3 % (ref 5–12)
NEUTROPHILS NFR BLD AUTO: 3.04 10*3/MM3 (ref 1.7–7)
NEUTROPHILS NFR BLD AUTO: 63.3 % (ref 42.7–76)
NITRITE UR QL STRIP: NEGATIVE
NRBC BLD AUTO-RTO: 0 /100 WBC (ref 0–0.2)
OPIATES UR QL: NEGATIVE
OXYCODONE UR QL SCN: NEGATIVE
PCP UR QL SCN: NEGATIVE
PH UR STRIP.AUTO: 7.5 [PH] (ref 5–8)
PLATELET # BLD AUTO: 131 10*3/MM3 (ref 140–450)
PMV BLD AUTO: 10.3 FL (ref 6–12)
POTASSIUM SERPL-SCNC: 3.9 MMOL/L (ref 3.5–5.2)
PROPOXYPH UR QL: NEGATIVE
PROT SERPL-MCNC: 6 G/DL (ref 6–8.5)
PROT UR QL STRIP: NEGATIVE
RBC # BLD AUTO: 4.45 10*6/MM3 (ref 3.77–5.28)
RBC # UR STRIP: ABNORMAL /HPF
REF LAB TEST METHOD: ABNORMAL
SODIUM SERPL-SCNC: 139 MMOL/L (ref 136–145)
SP GR UR STRIP: 1.01 (ref 1–1.03)
SQUAMOUS #/AREA URNS HPF: ABNORMAL /HPF
TRICYCLICS UR QL SCN: NEGATIVE
UROBILINOGEN UR QL STRIP: ABNORMAL
WBC # UR STRIP: ABNORMAL /HPF
WBC NRBC COR # BLD: 4.81 10*3/MM3 (ref 3.4–10.8)

## 2023-09-17 PROCEDURE — 80053 COMPREHEN METABOLIC PANEL: CPT

## 2023-09-17 PROCEDURE — 96376 TX/PRO/DX INJ SAME DRUG ADON: CPT

## 2023-09-17 PROCEDURE — 85025 COMPLETE CBC W/AUTO DIFF WBC: CPT

## 2023-09-17 PROCEDURE — 96375 TX/PRO/DX INJ NEW DRUG ADDON: CPT

## 2023-09-17 PROCEDURE — 96361 HYDRATE IV INFUSION ADD-ON: CPT

## 2023-09-17 PROCEDURE — 96374 THER/PROPH/DIAG INJ IV PUSH: CPT

## 2023-09-17 PROCEDURE — 25010000002 MORPHINE PER 10 MG

## 2023-09-17 PROCEDURE — 80307 DRUG TEST PRSMV CHEM ANLYZR: CPT

## 2023-09-17 PROCEDURE — 25010000002 ONDANSETRON PER 1 MG

## 2023-09-17 PROCEDURE — 83735 ASSAY OF MAGNESIUM: CPT

## 2023-09-17 PROCEDURE — P9612 CATHETERIZE FOR URINE SPEC: HCPCS

## 2023-09-17 PROCEDURE — 25010000002 DEXAMETHASONE SODIUM PHOSPHATE 10 MG/ML SOLUTION

## 2023-09-17 PROCEDURE — 81001 URINALYSIS AUTO W/SCOPE: CPT

## 2023-09-17 PROCEDURE — 25010000002 HEPARIN LOCK FLUSH PER 10 UNITS: Performed by: EMERGENCY MEDICINE

## 2023-09-17 PROCEDURE — 25010000002 DIPHENHYDRAMINE PER 50 MG

## 2023-09-17 PROCEDURE — 99283 EMERGENCY DEPT VISIT LOW MDM: CPT

## 2023-09-17 PROCEDURE — 36415 COLL VENOUS BLD VENIPUNCTURE: CPT

## 2023-09-17 RX ORDER — ONDANSETRON 2 MG/ML
4 INJECTION INTRAMUSCULAR; INTRAVENOUS ONCE
Status: COMPLETED | OUTPATIENT
Start: 2023-09-17 | End: 2023-09-17

## 2023-09-17 RX ORDER — MORPHINE SULFATE 2 MG/ML
2 INJECTION, SOLUTION INTRAMUSCULAR; INTRAVENOUS ONCE
Status: COMPLETED | OUTPATIENT
Start: 2023-09-17 | End: 2023-09-17

## 2023-09-17 RX ORDER — DIPHENHYDRAMINE HYDROCHLORIDE 50 MG/ML
25 INJECTION INTRAMUSCULAR; INTRAVENOUS ONCE
Status: COMPLETED | OUTPATIENT
Start: 2023-09-17 | End: 2023-09-17

## 2023-09-17 RX ORDER — SODIUM CHLORIDE 0.9 % (FLUSH) 0.9 %
10 SYRINGE (ML) INJECTION AS NEEDED
Status: DISCONTINUED | OUTPATIENT
Start: 2023-09-17 | End: 2023-09-17 | Stop reason: HOSPADM

## 2023-09-17 RX ORDER — DEXAMETHASONE SODIUM PHOSPHATE 10 MG/ML
10 INJECTION, SOLUTION INTRAMUSCULAR; INTRAVENOUS ONCE
Status: COMPLETED | OUTPATIENT
Start: 2023-09-17 | End: 2023-09-17

## 2023-09-17 RX ORDER — HEPARIN SODIUM (PORCINE) LOCK FLUSH IV SOLN 100 UNIT/ML 100 UNIT/ML
300 SOLUTION INTRAVENOUS ONCE
Status: COMPLETED | OUTPATIENT
Start: 2023-09-17 | End: 2023-09-17

## 2023-09-17 RX ORDER — SODIUM CHLORIDE 9 MG/ML
125 INJECTION, SOLUTION INTRAVENOUS CONTINUOUS
Status: DISCONTINUED | OUTPATIENT
Start: 2023-09-17 | End: 2023-09-17 | Stop reason: HOSPADM

## 2023-09-17 RX ADMIN — MORPHINE SULFATE 2 MG: 2 INJECTION, SOLUTION INTRAMUSCULAR; INTRAVENOUS at 14:09

## 2023-09-17 RX ADMIN — ONDANSETRON 4 MG: 2 INJECTION INTRAMUSCULAR; INTRAVENOUS at 14:09

## 2023-09-17 RX ADMIN — DEXAMETHASONE SODIUM PHOSPHATE 10 MG: 10 INJECTION INTRAMUSCULAR; INTRAVENOUS at 12:20

## 2023-09-17 RX ADMIN — MORPHINE SULFATE 2 MG: 2 INJECTION, SOLUTION INTRAMUSCULAR; INTRAVENOUS at 12:20

## 2023-09-17 RX ADMIN — DIPHENHYDRAMINE HYDROCHLORIDE 25 MG: 50 INJECTION INTRAMUSCULAR; INTRAVENOUS at 12:20

## 2023-09-17 RX ADMIN — SODIUM CHLORIDE 1000 ML: 9 INJECTION, SOLUTION INTRAVENOUS at 12:21

## 2023-09-17 RX ADMIN — DIPHENHYDRAMINE HYDROCHLORIDE 50 MG: 50 INJECTION INTRAMUSCULAR; INTRAVENOUS at 14:08

## 2023-09-17 RX ADMIN — SODIUM CHLORIDE 125 ML/HR: 9 INJECTION, SOLUTION INTRAVENOUS at 12:21

## 2023-09-17 RX ADMIN — ONDANSETRON 4 MG: 2 INJECTION INTRAMUSCULAR; INTRAVENOUS at 12:20

## 2023-09-17 RX ADMIN — Medication 300 UNITS: at 14:47

## 2023-09-17 NOTE — ED PROVIDER NOTES
Subjective   History of Present Illness  48-year-old female presents to the ED with chief complaint of migraine.  Patient has known history of chronic migraines.  Denies any visual disturbance.  Reports photophobia and nausea.  Denies any peripheral numbness or weakness.  No other acute complaints at this time such as chest pain, shortness of breath, or dizziness.      Review of Systems   Constitutional:  Negative for chills, fatigue and fever.   HENT:  Negative for congestion, sore throat and trouble swallowing.    Eyes:  Positive for photophobia. Negative for visual disturbance.   Respiratory:  Negative for cough, shortness of breath and wheezing.    Cardiovascular:  Negative for chest pain, palpitations and leg swelling.   Gastrointestinal:  Positive for nausea. Negative for abdominal pain, constipation, diarrhea and vomiting.   Genitourinary:  Negative for difficulty urinating, flank pain, frequency and urgency.   Musculoskeletal:  Negative for back pain, gait problem, neck pain and neck stiffness.   Neurological:  Positive for headaches (Migraine). Negative for dizziness, tremors, seizures, syncope, facial asymmetry, speech difficulty, weakness, light-headedness and numbness.     Past Medical History:   Diagnosis Date    Hoyos's disease     Bipolar 1 disorder     Constipation     DDD (degenerative disc disease), cervical 05/29/2017    Fibromyalgia     IBS (irritable bowel syndrome)     Meningioma     Migraine     PONV (postoperative nausea and vomiting)     PTSD (post-traumatic stress disorder)     Rectal bleeding        Allergies   Allergen Reactions    Ativan [Lorazepam] Hallucinations     confusion    Sulfa Antibiotics Shortness Of Breath and Swelling    Sulfa Antibiotics Anaphylaxis    Reglan [Metoclopramide] Angioedema    Compazine [Prochlorperazine Edisylate] Hives    Demerol [Meperidine] Hives    Droperidol Itching    Metoclopramide Swelling    Toradol [Ketorolac Tromethamine] Hives and Itching     Toradol [Ketorolac Tromethamine] Hives    Zosyn [Piperacillin Sod-Tazobactam So] Hives       Past Surgical History:   Procedure Laterality Date    ANAL SCOPE N/A 2016    Procedure: ANAL SCOPE;  Surgeon: Kael Lopez MD;  Location: [facility] OR;  Service:     APPENDECTOMY      COLONOSCOPY N/A 2016    Procedure: COLONOSCOPY  CPTCODE:31175;  Surgeon: Jose Antonio Belle III, MD;  Location: [facility] OR;  Service:     COLONOSCOPY N/A 2016    Procedure: COLONOSCOPY (88228) CPT;  Surgeon: Jose Antonio Belle III, MD;  Location: [facility] OR;  Service:     CYSTOSCOPY RETROGRADE PYELOGRAM Bilateral 2017    Procedure: CYSTOSCOPY RETROGRADE PYELOGRAM;  Surgeon: Mu Blunt MD;  Location: [facility] OR;  Service:     ENDOSCOPY N/A 2016    Procedure: ESOPHAGOGASTRODUODENOSCOPY WITH BIOPSY  CPTCODE:71821;  Surgeon: Jose Antonio Belle III, MD;  Location: [facility] OR;  Service:     HEMORRHOIDECTOMY N/A 2016    Procedure: HEMORRHOID STAPLING;  Surgeon: Kael Lopez MD;  Location: [facility] OR;  Service:     HYSTERECTOMY      KNEE SURGERY      LAPAROSCOPIC SALPINGOOPHERECTOMY      PORTACATH PLACEMENT N/A 2017    Procedure: INSERTION OF PORTACATH;  Surgeon: Celso Arredondo MD;  Location: [facility] OR;  Service:     SHOULDER SURGERY      3 times       Family History   Problem Relation Age of Onset    Crohn's disease Other     Hypertension Other     Diabetes Other     Irritable bowel syndrome Other     No Known Problems Father     No Known Problems Mother        Social History     Socioeconomic History    Marital status:    Tobacco Use    Smoking status: Former     Packs/day: 1.00     Years: 20.00     Pack years: 20.00     Types: Cigarettes     Quit date: 2015     Years since quittin.8     Passive exposure: Past    Smokeless tobacco: Never   Vaping Use    Vaping Use: Never used   Substance and Sexual Activity    Alcohol use: No    Drug use: Yes     Types: Marijuana     Comment: for  pain    Sexual activity: Defer     Birth control/protection: Surgical           Objective   Physical Exam  Vitals reviewed.   Constitutional:       General: She is awake. She is not in acute distress.     Appearance: She is not ill-appearing or diaphoretic.   HENT:      Head: Normocephalic and atraumatic.      Mouth/Throat:      Mouth: Mucous membranes are moist.      Pharynx: Oropharynx is clear.   Eyes:      Extraocular Movements: Extraocular movements intact.      Pupils: Pupils are equal, round, and reactive to light.   Cardiovascular:      Rate and Rhythm: Normal rate and regular rhythm.      Pulses: Normal pulses.           Dorsalis pedis pulses are 2+ on the right side and 2+ on the left side.      Heart sounds: Normal heart sounds. No murmur heard.    No friction rub.   Pulmonary:      Effort: Pulmonary effort is normal. No accessory muscle usage, respiratory distress or retractions.      Breath sounds: No wheezing, rhonchi or rales.   Abdominal:      General: Bowel sounds are normal. There is no distension.      Palpations: Abdomen is soft.      Tenderness: There is no abdominal tenderness. There is no guarding.   Musculoskeletal:      Cervical back: Neck supple. No rigidity.      Right lower leg: No edema.      Left lower leg: No edema.   Skin:     General: Skin is warm and dry.      Capillary Refill: Capillary refill takes 2 to 3 seconds.   Neurological:      Mental Status: She is alert and oriented to person, place, and time. Mental status is at baseline.      Cranial Nerves: No dysarthria or facial asymmetry.      Sensory: Sensation is intact.      Motor: No weakness or tremor.   Psychiatric:         Attention and Perception: Attention normal.         Mood and Affect: Mood normal.         Speech: Speech normal.         Behavior: Behavior normal. Behavior is cooperative.         Thought Content: Thought content normal.         Cognition and Memory: Cognition normal.         Judgment: Judgment normal.        Procedures           ED Course  ED Course as of 09/17/23 1732   Sun Sep 17, 2023   1433 Patient reports improvement in migraine and states that she is ready to go home. Encouraged to follow up with PCP for ED follow-up and ongoing management of migraines. She denies any needs at this time and states that she has some Zofran at home that she can take for nausea. [MC]      ED Course User Index  [MC] Julia Ovalle APRN           Results for orders placed or performed during the hospital encounter of 09/17/23   Urine Drug Screen - Urine, Clean Catch    Specimen: Urine, Clean Catch   Result Value Ref Range    THC, Screen, Urine Positive (A) Negative    Phencyclidine (PCP), Urine Negative Negative    Cocaine Screen, Urine Negative Negative    Methamphetamine, Ur Negative Negative    Opiate Screen Negative Negative    Amphetamine Screen, Urine Negative Negative    Benzodiazepine Screen, Urine Negative Negative    Tricyclic Antidepressants Screen Negative Negative    Methadone Screen, Urine Negative Negative    Barbiturates Screen, Urine Negative Negative    Oxycodone Screen, Urine Negative Negative    Propoxyphene Screen Negative Negative    Buprenorphine, Screen, Urine Negative Negative   Urinalysis With Microscopic If Indicated (No Culture) - Urine, Catheter    Specimen: Urine, Catheter   Result Value Ref Range    Color, UA Fe (A) Yellow, Straw    Appearance, UA Cloudy (A) Clear    pH, UA 7.5 5.0 - 8.0    Specific Gravity, UA 1.008 1.005 - 1.030    Glucose, UA Negative Negative    Ketones, UA Negative Negative    Bilirubin, UA Negative Negative    Blood, UA Large (3+) (A) Negative    Protein, UA Negative Negative    Leuk Esterase, UA Trace (A) Negative    Nitrite, UA Negative Negative    Urobilinogen, UA 0.2 E.U./dL 0.2 - 1.0 E.U./dL   Comprehensive Metabolic Panel    Specimen: Arm, Right; Blood   Result Value Ref Range    Glucose 92 65 - 99 mg/dL    BUN 5 (L) 6 - 20 mg/dL    Creatinine 0.58 0.57 - 1.00 mg/dL     Sodium 139 136 - 145 mmol/L    Potassium 3.9 3.5 - 5.2 mmol/L    Chloride 110 (H) 98 - 107 mmol/L    CO2 22.2 22.0 - 29.0 mmol/L    Calcium 8.4 (L) 8.6 - 10.5 mg/dL    Total Protein 6.0 6.0 - 8.5 g/dL    Albumin 3.7 3.5 - 5.2 g/dL    ALT (SGPT) 17 1 - 33 U/L    AST (SGOT) 20 1 - 32 U/L    Alkaline Phosphatase 53 39 - 117 U/L    Total Bilirubin 0.2 0.0 - 1.2 mg/dL    Globulin 2.3 gm/dL    A/G Ratio 1.6 g/dL    BUN/Creatinine Ratio 8.6 7.0 - 25.0    Anion Gap 6.8 5.0 - 15.0 mmol/L    eGFR 111.8 >60.0 mL/min/1.73   CBC Auto Differential    Specimen: Arm, Right; Blood   Result Value Ref Range    WBC 4.81 3.40 - 10.80 10*3/mm3    RBC 4.45 3.77 - 5.28 10*6/mm3    Hemoglobin 13.7 12.0 - 15.9 g/dL    Hematocrit 41.7 34.0 - 46.6 %    MCV 93.7 79.0 - 97.0 fL    MCH 30.8 26.6 - 33.0 pg    MCHC 32.9 31.5 - 35.7 g/dL    RDW 13.0 12.3 - 15.4 %    RDW-SD 44.2 37.0 - 54.0 fl    MPV 10.3 6.0 - 12.0 fL    Platelets 131 (L) 140 - 450 10*3/mm3    Neutrophil % 63.3 42.7 - 76.0 %    Lymphocyte % 32.0 19.6 - 45.3 %    Monocyte % 3.3 (L) 5.0 - 12.0 %    Eosinophil % 1.0 0.3 - 6.2 %    Basophil % 0.2 0.0 - 1.5 %    Immature Grans % 0.2 0.0 - 0.5 %    Neutrophils, Absolute 3.04 1.70 - 7.00 10*3/mm3    Lymphocytes, Absolute 1.54 0.70 - 3.10 10*3/mm3    Monocytes, Absolute 0.16 0.10 - 0.90 10*3/mm3    Eosinophils, Absolute 0.05 0.00 - 0.40 10*3/mm3    Basophils, Absolute 0.01 0.00 - 0.20 10*3/mm3    Immature Grans, Absolute 0.01 0.00 - 0.05 10*3/mm3    nRBC 0.0 0.0 - 0.2 /100 WBC   Magnesium    Specimen: Arm, Right; Blood   Result Value Ref Range    Magnesium 1.8 1.6 - 2.6 mg/dL   Fentanyl, Urine - Urine, Clean Catch    Specimen: Urine, Clean Catch   Result Value Ref Range    Fentanyl, Urine Negative Negative   Urinalysis, Microscopic Only - Urine, Catheter    Specimen: Urine, Catheter   Result Value Ref Range    RBC, UA Too Numerous to Count (A) None Seen, 0-2 /HPF    WBC, UA 0-2 None Seen, 0-2 /HPF    Bacteria, UA None Seen None Seen /HPF     Squamous Epithelial Cells, UA 0-2 None Seen, 0-2 /HPF    Hyaline Casts, UA None Seen None Seen /LPF    Methodology Automated Microscopy                                     Medical Decision Making  48-year-old female presents to the ED with chief complaint of migraine.  Patient has known history of chronic migraines.  Denies any visual disturbance.  Reports photophobia and nausea.  Denies any peripheral numbness or weakness.  No other acute complaints at this time such as chest pain, shortness of breath, or dizziness.  Received 1 L normal saline bolus, IV Benadryl, IV Zofran, and IV Decadron along with total of 4 mg IV morphine.  Migraine not relieved and patient reported that she felt much better.  Recommended to follow-up with PCP for ongoing management of chronic migraines.  She voiced agreement understanding with no further questions or concerns at this time.    Problems Addressed:  Intractable migraine without aura and without status migrainosus: complicated acute illness or injury    Amount and/or Complexity of Data Reviewed  Labs: ordered. Decision-making details documented in ED Course.    Risk  Prescription drug management.        Final diagnoses:   Intractable migraine without aura and without status migrainosus       ED Disposition  ED Disposition       ED Disposition   Discharge    Condition   Stable    Comment   --               Mabel Patterson, MIGDALIA  [address] [address]  [phone]    In 1 week  ED follow up, chronic migraine management    [facility] EMERGENCY DEPARTMENT  [address] [facility] [id number] [phone]    If symptoms worsen         Medication List      No changes were made to your prescriptions during this visit.            Julia Ovalle, MIGDALIA  09/17/23 5723

## 2023-09-21 ENCOUNTER — OFFICE VISIT (OUTPATIENT)
Dept: UROLOGY | Facility: CLINIC | Age: 48
End: 2023-09-21
Payer: COMMERCIAL

## 2023-09-21 VITALS
SYSTOLIC BLOOD PRESSURE: 104 MMHG | WEIGHT: 100.6 LBS | HEIGHT: 68 IN | HEART RATE: 74 BPM | BODY MASS INDEX: 15.25 KG/M2 | DIASTOLIC BLOOD PRESSURE: 78 MMHG

## 2023-09-21 DIAGNOSIS — N35.028 OTHER POST-TRAUMATIC URETHRAL STRICTURE, FEMALE: ICD-10-CM

## 2023-09-21 DIAGNOSIS — F31.9 BIPOLAR 1 DISORDER: ICD-10-CM

## 2023-09-21 DIAGNOSIS — N23 RENAL COLIC: ICD-10-CM

## 2023-09-21 RX ORDER — OXYCODONE AND ACETAMINOPHEN 10; 325 MG/1; MG/1
1 TABLET ORAL EVERY 6 HOURS PRN
Qty: 16 TABLET | Refills: 0 | Status: SHIPPED | OUTPATIENT
Start: 2023-09-21

## 2023-09-21 RX ORDER — GENTAMICIN SULFATE 40 MG/ML
80 INJECTION, SOLUTION INTRAMUSCULAR; INTRAVENOUS ONCE
Status: COMPLETED | OUTPATIENT
Start: 2023-09-21 | End: 2023-09-21

## 2023-09-21 RX ORDER — MEGESTROL ACETATE 20 MG/1
20 TABLET ORAL DAILY
Qty: 30 TABLET | Refills: 3 | Status: SHIPPED | OUTPATIENT
Start: 2023-09-21

## 2023-09-21 RX ORDER — PROMETHAZINE HYDROCHLORIDE 25 MG/1
25 TABLET ORAL EVERY 6 HOURS PRN
Qty: 28 TABLET | Refills: 4 | Status: SHIPPED | OUTPATIENT
Start: 2023-09-21

## 2023-09-21 RX ADMIN — GENTAMICIN SULFATE 80 MG: 40 INJECTION, SOLUTION INTRAMUSCULAR; INTRAVENOUS at 08:56

## 2023-09-21 NOTE — PROGRESS NOTES
Chief Complaint:      Chief Complaint   Patient presents with    Other post-traumatic uretheral stricture, female       HPI:   48 y.o. female here for dilation.  She had a full mouth extraction this week.    Past Medical History:     Past Medical History:   Diagnosis Date    Hoyos's disease     Bipolar 1 disorder     Constipation     DDD (degenerative disc disease), cervical 05/29/2017    Fibromyalgia     IBS (irritable bowel syndrome)     Meningioma     Migraine     PONV (postoperative nausea and vomiting)     PTSD (post-traumatic stress disorder)     Rectal bleeding        Current Meds:     Current Outpatient Medications   Medication Sig Dispense Refill    albuterol (PROVENTIL HFA;VENTOLIN HFA) 108 (90 Base) MCG/ACT inhaler Inhale 2 puffs 2 (Two) Times a Day.      Azelastine HCl 137 MCG/SPRAY solution 1 spray into the nostril(s) as directed by provider As Needed (Allergies).      busPIRone (BUSPAR) 15 MG tablet Take 1 tablet by mouth 3 (Three) Times a Day.      diclofenac (VOLTAREN) 1 % gel gel       divalproex (DEPAKOTE) 500 MG DR tablet Take 1 tablet by mouth 3 (Three) Times a Day. 90 tablet 2    docusate sodium (COLACE) 100 MG capsule Take 1 capsule by mouth 2 (Two) Times a Day. 20 capsule 0    docusate sodium (COLACE) 100 MG capsule Take 1 capsule by mouth 2 (Two) Times a Day As Needed for Constipation. 60 capsule 0    fluticasone (VERAMYST) 27.5 MCG/SPRAY nasal spray 2 sprays into the nostril(s) as directed by provider Daily.      megestrol (MEGACE) 20 MG tablet Take 1 tablet by mouth Daily. 30 tablet 3    methylPREDNISolone (MEDROL) 4 MG dose pack Take as directed on package instructions. 21 tablet 0    metroNIDAZOLE (FLAGYL) 500 MG tablet Take 1 tablet by mouth 3 (Three) Times a Day. 30 tablet 0    montelukast (SINGULAIR) 10 MG tablet Take 1 tablet by mouth Every Night.      ondansetron ODT (ZOFRAN-ODT) 4 MG disintegrating tablet Place 2 tablets on the tongue Every 8 (Eight) Hours As Needed for Nausea or  Vomiting. 30 tablet 0    oxyCODONE-acetaminophen (Percocet)  MG per tablet Take 1 tablet by mouth Every 6 (Six) Hours As Needed for Moderate Pain. 16 tablet 0    oxyCODONE-acetaminophen (Percocet)  MG per tablet Take 1 tablet by mouth Every 6 (Six) Hours As Needed for Moderate Pain. 20 tablet 0    oxyCODONE-acetaminophen (PERCOCET) 5-325 MG per tablet Take 1 tablet by mouth Every 8 (Eight) Hours As Needed for Moderate Pain. 12 tablet 0    pantoprazole (PROTONIX) 40 MG EC tablet Take 1 tablet by mouth 2 (Two) Times a Day As Needed.      phenazopyridine (PYRIDIUM) 100 MG tablet Take 1 tablet by mouth 3 (Three) Times a Day As Needed for Bladder Spasms. 30 tablet 1    polyethylene glycol (MIRALAX) 17 GM/SCOOP powder Take 17 g by mouth Daily. 225 g 0    promethazine (PHENERGAN) 25 MG tablet Take 1 tablet by mouth Every 6 (Six) Hours As Needed for Nausea or Vomiting. 28 tablet 4    promethazine (PHENERGAN) 25 MG tablet Take 1 tablet by mouth Every 6 (Six) Hours As Needed for Nausea or Vomiting. 21 tablet 5    sertraline (ZOLOFT) 100 MG tablet Take 1 tablet by mouth 2 (Two) Times a Day.      SUMAtriptan (IMITREX) 6 MG/0.5ML injection Inject prescribed dose at onset of headache. May repeat dose one time in 1 hour(s) if headache not relieved.      traMADol (ULTRAM) 50 MG tablet       terazosin (HYTRIN) 2 MG capsule Take 1 capsule by mouth Every Night for 14 days. 14 capsule 0     No current facility-administered medications for this visit.        Allergies:      Allergies   Allergen Reactions    Ativan [Lorazepam] Hallucinations     confusion    Sulfa Antibiotics Shortness Of Breath and Swelling    Sulfa Antibiotics Anaphylaxis    Reglan [Metoclopramide] Angioedema    Compazine [Prochlorperazine Edisylate] Hives    Demerol [Meperidine] Hives    Droperidol Itching    Metoclopramide Swelling    Toradol [Ketorolac Tromethamine] Hives and Itching    Toradol [Ketorolac Tromethamine] Hives    Zosyn [Piperacillin  Sod-Tazobactam So] Hives        Past Surgical History:     Past Surgical History:   Procedure Laterality Date    ANAL SCOPE N/A 2016    Procedure: ANAL SCOPE;  Surgeon: Kael Lopez MD;  Location: [facility] OR;  Service:     APPENDECTOMY      COLONOSCOPY N/A 2016    Procedure: COLONOSCOPY  CPTCODE:81706;  Surgeon: Jose Antonio Belle III, MD;  Location: [facility] OR;  Service:     COLONOSCOPY N/A 2016    Procedure: COLONOSCOPY (86774) CPT;  Surgeon: Jose Antonio Belle III, MD;  Location: [facility] OR;  Service:     CYSTOSCOPY RETROGRADE PYELOGRAM Bilateral 2017    Procedure: CYSTOSCOPY RETROGRADE PYELOGRAM;  Surgeon: Mu Blunt MD;  Location: [facility] OR;  Service:     ENDOSCOPY N/A 2016    Procedure: ESOPHAGOGASTRODUODENOSCOPY WITH BIOPSY  CPTCODE:81270;  Surgeon: Jose Antonio Belle III, MD;  Location: [facility] OR;  Service:     HEMORRHOIDECTOMY N/A 2016    Procedure: HEMORRHOID STAPLING;  Surgeon: Kael Lopez MD;  Location: [facility] OR;  Service:     HYSTERECTOMY      KNEE SURGERY      LAPAROSCOPIC SALPINGOOPHERECTOMY      PORTACATH PLACEMENT N/A 2017    Procedure: INSERTION OF PORTACATH;  Surgeon: Celso Arredondo MD;  Location: [facility] OR;  Service:     SHOULDER SURGERY      3 times       Social History:     Social History     Socioeconomic History    Marital status:    Tobacco Use    Smoking status: Former     Packs/day: 1.00     Years: 20.00     Pack years: 20.00     Types: Cigarettes     Quit date: 2015     Years since quittin.8     Passive exposure: Past    Smokeless tobacco: Never   Vaping Use    Vaping Use: Never used   Substance and Sexual Activity    Alcohol use: No    Drug use: Yes     Types: Marijuana     Comment: for pain    Sexual activity: Defer     Birth control/protection: Surgical       Family History:     Family History   Problem Relation Age of Onset    Crohn's disease Other     Hypertension Other     Diabetes Other     Irritable bowel  syndrome Other     No Known Problems Father     No Known Problems Mother        Review of Systems:     Review of Systems   Constitutional: Negative.  Negative for activity change, appetite change, chills, diaphoresis, fatigue and unexpected weight change.   HENT:  Negative for congestion, dental problem, drooling, ear discharge, ear pain, facial swelling, hearing loss, mouth sores, nosebleeds, postnasal drip, rhinorrhea, sinus pressure, sneezing, sore throat, tinnitus, trouble swallowing and voice change.    Eyes: Negative.  Negative for photophobia, pain, discharge, redness, itching and visual disturbance.   Respiratory: Negative.  Negative for apnea, cough, choking, chest tightness, shortness of breath, wheezing and stridor.    Cardiovascular: Negative.  Negative for chest pain, palpitations and leg swelling.   Gastrointestinal: Negative.  Negative for abdominal distention, abdominal pain, anal bleeding, blood in stool, constipation, diarrhea, nausea, rectal pain and vomiting.   Endocrine: Negative.  Negative for cold intolerance, heat intolerance, polydipsia, polyphagia and polyuria.   Genitourinary:  Positive for difficulty urinating and hematuria.   Musculoskeletal: Negative.  Negative for arthralgias, back pain, gait problem, joint swelling, myalgias, neck pain and neck stiffness.   Skin: Negative.  Negative for color change, pallor, rash and wound.   Allergic/Immunologic: Negative.  Negative for environmental allergies, food allergies and immunocompromised state.   Neurological: Negative.  Negative for dizziness, tremors, seizures, syncope, facial asymmetry, speech difficulty, weakness, light-headedness, numbness and headaches.   Hematological: Negative.  Negative for adenopathy. Does not bruise/bleed easily.   Psychiatric/Behavioral:  Negative for agitation, behavioral problems, confusion, decreased concentration, dysphoric mood, hallucinations, self-injury, sleep disturbance and suicidal ideas. The patient  is not nervous/anxious and is not hyperactive.    All other systems reviewed and are negative.    Physical Exam:     Physical Exam  Constitutional:       Appearance: She is well-developed.   HENT:      Head: Normocephalic and atraumatic.      Right Ear: External ear normal.      Left Ear: External ear normal.   Eyes:      Conjunctiva/sclera: Conjunctivae normal.      Pupils: Pupils are equal, round, and reactive to light.   Cardiovascular:      Rate and Rhythm: Normal rate and regular rhythm.      Heart sounds: Normal heart sounds.   Pulmonary:      Effort: Pulmonary effort is normal.      Breath sounds: Normal breath sounds.   Abdominal:      General: Bowel sounds are normal. There is no distension.      Palpations: Abdomen is soft. There is no mass.      Tenderness: There is no abdominal tenderness. There is no guarding or rebound.   Genitourinary:     General: Normal vulva.      Vagina: No vaginal discharge.   Musculoskeletal:         General: Normal range of motion.   Skin:     General: Skin is warm and dry.   Neurological:      Mental Status: She is alert.      Deep Tendon Reflexes: Reflexes are normal and symmetric.   Psychiatric:         Behavior: Behavior normal.         Thought Content: Thought content normal.         Judgment: Judgment normal.       I have reviewed the following portions of the patient's history: Allergies, current medications, past family history, past medical history, past social history, past surgical history, problem list, and ROS and confirm it is accurate.  Recent Image (CT and/or KUB):      CT Abdomen and Pelvis: No results found for this or any previous visit.       CT Stone Protocol: Results for orders placed during the hospital encounter of 03/16/23    CT Abdomen Pelvis Stone Protocol    Narrative  CT ABDOMEN AND PELVIS, NONCONTRAST, 3/16/2023    HISTORY:  48-year-old female in the ED with lower abdomen pain, hematuria.    TECHNIQUE:  CT imaging of the abdomen and pelvis ordered  as noncontrast kidney stone protocol exam. Radiation dose reduction techniques included automated exposure control. Radiation audit for CT and nuclear cardiology exams in the last 12 months: 0.    COMPARISON:  *  CT abdomen/pelvis, 10/3/2022.    ABDOMEN FINDINGS:  Both kidneys, both ureters and urinary bladder are normal in noncontrast CT appearance. No visible radiopaque renal or urinary tract stone material. No evidence of upper urinary tract obstruction.    No gallbladder distention or bile duct dilatation. Liver, pancreas and spleen are normal in size and appearance without contrast. Normal caliber abdominal aorta.    Small bowel and colon are normal in caliber and appearance, as imaged. The appendix is not directly visualized, but there is no indirect CT evidence of acute appendicitis or other active inflammation in the ileocecal region. No gastric distention, hiatal  hernia or distal esophageal dilatation.    PELVIS FINDINGS:  Hysterectomy. Urinary bladder and rectum are within normal limits. No free pelvic fluid. No inguinal hernia.    Lung base images show no active disease. There is some cylindrical bronchiectasis involving the posterior segment right lower lobe. Fluid opacification of one of the bronchiectatic segments caused a nodular appearance on prior imaging, but this has  resolved today. There is air trapping within this segment.    Mild chronic superior endplate vertebral compression fracture deformity is incidentally noted at L2, unchanged.    Impression  1. No acute abnormality within the abdomen or pelvis.  2. No visible nephrolithiasis or evidence of urinary obstruction.  3. Mild chronic bronchiectasis in the posterior right lower lobe.  4. Mild chronic fracture deformity of the L2 vertebral body.    Signer Name: Sang Painting MD  Signed: 3/16/2023 6:02 PM  Workstation Name: [facility]  [facility]       KUB: Results for orders placed during the hospital encounter  of 01/10/23    XR Abdomen KUB    Narrative  EXAM:  XR Abdomen, 1 View    EXAM DATE:  1/10/2023 12:05 PM    CLINICAL HISTORY:  flank pain    TECHNIQUE:  Frontal supine view of the abdomen/pelvis.    COMPARISON:  10/01/2022    FINDINGS:  Gastrointestinal tract:  Unremarkable.  No dilation.  Organs:  No stones identified along the expected course of ureters.  Bones/joints:  Unremarkable.  Soft tissues:  Surgical staples in the lower pelvis.  Vasculature:  Stable phleboliths lower pelvis.    Impression  1.  No acute findings radiographically evident.  2.  No radiographic evidence of renal or ureteral stones.    This report was finalized on 1/10/2023 12:47 PM by Dr. Ankit Naik MD.       Labs (past 3 months):      Admission on 09/17/2023, Discharged on 09/17/2023   Component Date Value Ref Range Status    THC, Screen, Urine 09/17/2023 Positive (A)  Negative Final    Phencyclidine (PCP), Urine 09/17/2023 Negative  Negative Final    Cocaine Screen, Urine 09/17/2023 Negative  Negative Final    Methamphetamine, Ur 09/17/2023 Negative  Negative Final    Opiate Screen 09/17/2023 Negative  Negative Final    Amphetamine Screen, Urine 09/17/2023 Negative  Negative Final    Benzodiazepine Screen, Urine 09/17/2023 Negative  Negative Final    Tricyclic Antidepressants Screen 09/17/2023 Negative  Negative Final    Methadone Screen, Urine 09/17/2023 Negative  Negative Final    Barbiturates Screen, Urine 09/17/2023 Negative  Negative Final    Oxycodone Screen, Urine 09/17/2023 Negative  Negative Final    Propoxyphene Screen 09/17/2023 Negative  Negative Final    Buprenorphine, Screen, Urine 09/17/2023 Negative  Negative Final    Color, UA 09/17/2023 Fe (A)  Yellow, Straw Final    Appearance, UA 09/17/2023 Cloudy (A)  Clear Final    pH, UA 09/17/2023 7.5  5.0 - 8.0 Final    Specific Gravity, UA 09/17/2023 1.008  1.005 - 1.030 Final    Glucose, UA 09/17/2023 Negative  Negative Final    Ketones, UA 09/17/2023 Negative  Negative  Final    Bilirubin, UA 09/17/2023 Negative  Negative Final    Blood, UA 09/17/2023 Large (3+) (A)  Negative Final    Protein, UA 09/17/2023 Negative  Negative Final    Leuk Esterase, UA 09/17/2023 Trace (A)  Negative Final    Nitrite, UA 09/17/2023 Negative  Negative Final    Urobilinogen, UA 09/17/2023 0.2 E.U./dL  0.2 - 1.0 E.U./dL Final    Glucose 09/17/2023 92  65 - 99 mg/dL Final    BUN 09/17/2023 5 (L)  6 - 20 mg/dL Final    Creatinine 09/17/2023 0.58  0.57 - 1.00 mg/dL Final    Sodium 09/17/2023 139  136 - 145 mmol/L Final    Potassium 09/17/2023 3.9  3.5 - 5.2 mmol/L Final    Chloride 09/17/2023 110 (H)  98 - 107 mmol/L Final    CO2 09/17/2023 22.2  22.0 - 29.0 mmol/L Final    Calcium 09/17/2023 8.4 (L)  8.6 - 10.5 mg/dL Final    Total Protein 09/17/2023 6.0  6.0 - 8.5 g/dL Final    Albumin 09/17/2023 3.7  3.5 - 5.2 g/dL Final    ALT (SGPT) 09/17/2023 17  1 - 33 U/L Final    AST (SGOT) 09/17/2023 20  1 - 32 U/L Final    Alkaline Phosphatase 09/17/2023 53  39 - 117 U/L Final    Total Bilirubin 09/17/2023 0.2  0.0 - 1.2 mg/dL Final    Globulin 09/17/2023 2.3  gm/dL Final    A/G Ratio 09/17/2023 1.6  g/dL Final    BUN/Creatinine Ratio 09/17/2023 8.6  7.0 - 25.0 Final    Anion Gap 09/17/2023 6.8  5.0 - 15.0 mmol/L Final    eGFR 09/17/2023 111.8  >60.0 mL/min/1.73 Final    WBC 09/17/2023 4.81  3.40 - 10.80 10*3/mm3 Final    RBC 09/17/2023 4.45  3.77 - 5.28 10*6/mm3 Final    Hemoglobin 09/17/2023 13.7  12.0 - 15.9 g/dL Final    Hematocrit 09/17/2023 41.7  34.0 - 46.6 % Final    MCV 09/17/2023 93.7  79.0 - 97.0 fL Final    MCH 09/17/2023 30.8  26.6 - 33.0 pg Final    MCHC 09/17/2023 32.9  31.5 - 35.7 g/dL Final    RDW 09/17/2023 13.0  12.3 - 15.4 % Final    RDW-SD 09/17/2023 44.2  37.0 - 54.0 fl Final    MPV 09/17/2023 10.3  6.0 - 12.0 fL Final    Platelets 09/17/2023 131 (L)  140 - 450 10*3/mm3 Final    Neutrophil % 09/17/2023 63.3  42.7 - 76.0 % Final    Lymphocyte % 09/17/2023 32.0  19.6 - 45.3 % Final     Monocyte % 09/17/2023 3.3 (L)  5.0 - 12.0 % Final    Eosinophil % 09/17/2023 1.0  0.3 - 6.2 % Final    Basophil % 09/17/2023 0.2  0.0 - 1.5 % Final    Immature Grans % 09/17/2023 0.2  0.0 - 0.5 % Final    Neutrophils, Absolute 09/17/2023 3.04  1.70 - 7.00 10*3/mm3 Final    Lymphocytes, Absolute 09/17/2023 1.54  0.70 - 3.10 10*3/mm3 Final    Monocytes, Absolute 09/17/2023 0.16  0.10 - 0.90 10*3/mm3 Final    Eosinophils, Absolute 09/17/2023 0.05  0.00 - 0.40 10*3/mm3 Final    Basophils, Absolute 09/17/2023 0.01  0.00 - 0.20 10*3/mm3 Final    Immature Grans, Absolute 09/17/2023 0.01  0.00 - 0.05 10*3/mm3 Final    nRBC 09/17/2023 0.0  0.0 - 0.2 /100 WBC Final    Magnesium 09/17/2023 1.8  1.6 - 2.6 mg/dL Final    Fentanyl, Urine 09/17/2023 Negative  Negative Final    RBC, UA 09/17/2023 Too Numerous to Count (A)  None Seen, 0-2 /HPF Final    WBC, UA 09/17/2023 0-2  None Seen, 0-2 /HPF Final    Bacteria, UA 09/17/2023 None Seen  None Seen /HPF Final    Squamous Epithelial Cells, UA 09/17/2023 0-2  None Seen, 0-2 /HPF Final    Hyaline Casts, UA 09/17/2023 None Seen  None Seen /LPF Final    Methodology 09/17/2023 Automated Microscopy   Final   Admission on 08/26/2023, Discharged on 08/26/2023   Component Date Value Ref Range Status    Glucose 08/26/2023 74  65 - 99 mg/dL Final    BUN 08/26/2023 7  6 - 20 mg/dL Final    Creatinine 08/26/2023 0.50 (L)  0.57 - 1.00 mg/dL Final    Sodium 08/26/2023 140  136 - 145 mmol/L Final    Potassium 08/26/2023 3.9  3.5 - 5.2 mmol/L Final    Chloride 08/26/2023 106  98 - 107 mmol/L Final    CO2 08/26/2023 23.6  22.0 - 29.0 mmol/L Final    Calcium 08/26/2023 9.0  8.6 - 10.5 mg/dL Final    Total Protein 08/26/2023 6.5  6.0 - 8.5 g/dL Final    Albumin 08/26/2023 4.1  3.5 - 5.2 g/dL Final    ALT (SGPT) 08/26/2023 36 (H)  1 - 33 U/L Final    AST (SGOT) 08/26/2023 29  1 - 32 U/L Final    Alkaline Phosphatase 08/26/2023 62  39 - 117 U/L Final    Total Bilirubin 08/26/2023 0.6  0.0 - 1.2 mg/dL  Final    Globulin 08/26/2023 2.4  gm/dL Final    A/G Ratio 08/26/2023 1.7  g/dL Final    BUN/Creatinine Ratio 08/26/2023 14.0  7.0 - 25.0 Final    Anion Gap 08/26/2023 10.4  5.0 - 15.0 mmol/L Final    eGFR 08/26/2023 115.9  >60.0 mL/min/1.73 Final    Protime 08/26/2023 13.4  12.1 - 14.7 Seconds Final    INR 08/26/2023 0.97  0.90 - 1.10 Final    C-Reactive Protein 08/26/2023 <0.30  0.00 - 0.50 mg/dL Final    QT Interval 08/26/2023 366  ms Final    QTC Interval 08/26/2023 462  ms Final    Extra Tube 08/26/2023 Hold for add-ons.   Final    Auto resulted.    Extra Tube 08/26/2023 hold for add-on   Final    Auto resulted    Extra Tube 08/26/2023 Hold for add-ons.   Final    Auto resulted.    Extra Tube 08/26/2023 Hold for add-ons.   Final    Auto resulted    WBC 08/26/2023 5.32  3.40 - 10.80 10*3/mm3 Final    RBC 08/26/2023 5.04  3.77 - 5.28 10*6/mm3 Final    Hemoglobin 08/26/2023 15.3  12.0 - 15.9 g/dL Final    Hematocrit 08/26/2023 46.2  34.0 - 46.6 % Final    MCV 08/26/2023 91.7  79.0 - 97.0 fL Final    MCH 08/26/2023 30.4  26.6 - 33.0 pg Final    MCHC 08/26/2023 33.1  31.5 - 35.7 g/dL Final    RDW 08/26/2023 13.0  12.3 - 15.4 % Final    RDW-SD 08/26/2023 43.7  37.0 - 54.0 fl Final    MPV 08/26/2023 10.3  6.0 - 12.0 fL Final    Platelets 08/26/2023 150  140 - 450 10*3/mm3 Final    Neutrophil % 08/26/2023 44.9  42.7 - 76.0 % Final    Lymphocyte % 08/26/2023 44.0  19.6 - 45.3 % Final    Monocyte % 08/26/2023 7.9  5.0 - 12.0 % Final    Eosinophil % 08/26/2023 2.8  0.3 - 6.2 % Final    Basophil % 08/26/2023 0.2  0.0 - 1.5 % Final    Immature Grans % 08/26/2023 0.2  0.0 - 0.5 % Final    Neutrophils, Absolute 08/26/2023 2.39  1.70 - 7.00 10*3/mm3 Final    Lymphocytes, Absolute 08/26/2023 2.34  0.70 - 3.10 10*3/mm3 Final    Monocytes, Absolute 08/26/2023 0.42  0.10 - 0.90 10*3/mm3 Final    Eosinophils, Absolute 08/26/2023 0.15  0.00 - 0.40 10*3/mm3 Final    Basophils, Absolute 08/26/2023 0.01  0.00 - 0.20 10*3/mm3 Final     Immature Grans, Absolute 08/26/2023 0.01  0.00 - 0.05 10*3/mm3 Final    nRBC 08/26/2023 0.0  0.0 - 0.2 /100 WBC Final        Procedure:     Urethral dilation-after an appropriate informed consent, the patient was brought to the procedure suite.  The urethra was gently anesthetized with 10 mL of 2% viscous Xylocaine jelly.  After an adequate period of topical anesthesia I went ahead and  dilated with Gibson sounds from 16 to 26 Indian sequentially without complication. The patient was given gentamicin as prophylaxis with 80 mg     Posttraumatic urethral stricture-status post dilation   full mouth dental extraction is losing weight at a very significant rate          This document has been electronically signed by VERENICE FINK MD September 21, 2023 08:30 EDT    Dictated Utilizing Dragon Dictation: Part of this note may be an electronic transcription/translation of spoken language to printed text using the Dragon Dictation System.

## 2023-10-10 ENCOUNTER — HOSPITAL ENCOUNTER (EMERGENCY)
Facility: HOSPITAL | Age: 48
Discharge: HOME | End: 2023-10-10
Attending: STUDENT IN AN ORGANIZED HEALTH CARE EDUCATION/TRAINING PROGRAM | Admitting: STUDENT IN AN ORGANIZED HEALTH CARE EDUCATION/TRAINING PROGRAM
Payer: COMMERCIAL

## 2023-10-10 ENCOUNTER — APPOINTMENT (OUTPATIENT)
Dept: CT IMAGING | Facility: HOSPITAL | Age: 48
End: 2023-10-10
Payer: COMMERCIAL

## 2023-10-10 VITALS
WEIGHT: 102 LBS | HEART RATE: 91 BPM | TEMPERATURE: 98.1 F | RESPIRATION RATE: 16 BRPM | SYSTOLIC BLOOD PRESSURE: 109 MMHG | BODY MASS INDEX: 15.46 KG/M2 | HEIGHT: 68 IN | OXYGEN SATURATION: 97 % | DIASTOLIC BLOOD PRESSURE: 78 MMHG

## 2023-10-10 DIAGNOSIS — N39.0 ACUTE UTI: Primary | ICD-10-CM

## 2023-10-10 DIAGNOSIS — N35.92 STRICTURE OF FEMALE URETHRA, UNSPECIFIED STRICTURE TYPE: ICD-10-CM

## 2023-10-10 LAB
ALBUMIN SERPL-MCNC: 4.4 G/DL (ref 3.5–5.2)
ALBUMIN/GLOB SERPL: 1.6 G/DL
ALP SERPL-CCNC: 67 U/L (ref 39–117)
ALT SERPL W P-5'-P-CCNC: 36 U/L (ref 1–33)
ANION GAP SERPL CALCULATED.3IONS-SCNC: 7.7 MMOL/L (ref 5–15)
AST SERPL-CCNC: 32 U/L (ref 1–32)
BACTERIA UR QL AUTO: ABNORMAL /HPF
BASOPHILS # BLD AUTO: 0.01 10*3/MM3 (ref 0–0.2)
BASOPHILS NFR BLD AUTO: 0.2 % (ref 0–1.5)
BILIRUB SERPL-MCNC: 0.4 MG/DL (ref 0–1.2)
BILIRUB UR QL STRIP: NEGATIVE
BUN SERPL-MCNC: 9 MG/DL (ref 6–20)
BUN/CREAT SERPL: 14.5 (ref 7–25)
CALCIUM SPEC-SCNC: 9.5 MG/DL (ref 8.6–10.5)
CHLORIDE SERPL-SCNC: 104 MMOL/L (ref 98–107)
CLARITY UR: CLEAR
CO2 SERPL-SCNC: 27.3 MMOL/L (ref 22–29)
COLOR UR: ABNORMAL
CREAT SERPL-MCNC: 0.62 MG/DL (ref 0.57–1)
DEPRECATED RDW RBC AUTO: 49.1 FL (ref 37–54)
EGFRCR SERPLBLD CKD-EPI 2021: 110 ML/MIN/1.73
EOSINOPHIL # BLD AUTO: 0.13 10*3/MM3 (ref 0–0.4)
EOSINOPHIL NFR BLD AUTO: 2.4 % (ref 0.3–6.2)
ERYTHROCYTE [DISTWIDTH] IN BLOOD BY AUTOMATED COUNT: 13.8 % (ref 12.3–15.4)
GLOBULIN UR ELPH-MCNC: 2.7 GM/DL
GLUCOSE SERPL-MCNC: 87 MG/DL (ref 65–99)
GLUCOSE UR STRIP-MCNC: NEGATIVE MG/DL
HCT VFR BLD AUTO: 49 % (ref 34–46.6)
HGB BLD-MCNC: 15.8 G/DL (ref 12–15.9)
HGB UR QL STRIP.AUTO: ABNORMAL
HYALINE CASTS UR QL AUTO: ABNORMAL /LPF
IMM GRANULOCYTES # BLD AUTO: 0.01 10*3/MM3 (ref 0–0.05)
IMM GRANULOCYTES NFR BLD AUTO: 0.2 % (ref 0–0.5)
KETONES UR QL STRIP: NEGATIVE
LEUKOCYTE ESTERASE UR QL STRIP.AUTO: ABNORMAL
LYMPHOCYTES # BLD AUTO: 2.13 10*3/MM3 (ref 0.7–3.1)
LYMPHOCYTES NFR BLD AUTO: 39 % (ref 19.6–45.3)
MCH RBC QN AUTO: 30.9 PG (ref 26.6–33)
MCHC RBC AUTO-ENTMCNC: 32.2 G/DL (ref 31.5–35.7)
MCV RBC AUTO: 95.9 FL (ref 79–97)
MONOCYTES # BLD AUTO: 0.37 10*3/MM3 (ref 0.1–0.9)
MONOCYTES NFR BLD AUTO: 6.8 % (ref 5–12)
NEUTROPHILS NFR BLD AUTO: 2.81 10*3/MM3 (ref 1.7–7)
NEUTROPHILS NFR BLD AUTO: 51.4 % (ref 42.7–76)
NITRITE UR QL STRIP: NEGATIVE
NRBC BLD AUTO-RTO: 0 /100 WBC (ref 0–0.2)
PH UR STRIP.AUTO: 7.5 [PH] (ref 5–8)
PLATELET # BLD AUTO: 147 10*3/MM3 (ref 140–450)
PMV BLD AUTO: 10 FL (ref 6–12)
POTASSIUM SERPL-SCNC: 4.2 MMOL/L (ref 3.5–5.2)
PROT SERPL-MCNC: 7.1 G/DL (ref 6–8.5)
PROT UR QL STRIP: ABNORMAL
RBC # BLD AUTO: 5.11 10*6/MM3 (ref 3.77–5.28)
RBC # UR STRIP: ABNORMAL /HPF
REF LAB TEST METHOD: ABNORMAL
SODIUM SERPL-SCNC: 139 MMOL/L (ref 136–145)
SP GR UR STRIP: 1.01 (ref 1–1.03)
SQUAMOUS #/AREA URNS HPF: ABNORMAL /HPF
UROBILINOGEN UR QL STRIP: ABNORMAL
VALPROATE SERPL-MCNC: <2.8 MCG/ML (ref 50–125)
WBC # UR STRIP: ABNORMAL /HPF
WBC NRBC COR # BLD: 5.46 10*3/MM3 (ref 3.4–10.8)

## 2023-10-10 PROCEDURE — 85025 COMPLETE CBC W/AUTO DIFF WBC: CPT | Performed by: PHYSICIAN ASSISTANT

## 2023-10-10 PROCEDURE — 36415 COLL VENOUS BLD VENIPUNCTURE: CPT

## 2023-10-10 PROCEDURE — 63710000001 ONDANSETRON ODT 4 MG TABLET DISPERSIBLE: Performed by: PHYSICIAN ASSISTANT

## 2023-10-10 PROCEDURE — 74176 CT ABD & PELVIS W/O CONTRAST: CPT

## 2023-10-10 PROCEDURE — 74176 CT ABD & PELVIS W/O CONTRAST: CPT | Performed by: RADIOLOGY

## 2023-10-10 PROCEDURE — 96374 THER/PROPH/DIAG INJ IV PUSH: CPT

## 2023-10-10 PROCEDURE — 80164 ASSAY DIPROPYLACETIC ACD TOT: CPT | Performed by: PHYSICIAN ASSISTANT

## 2023-10-10 PROCEDURE — 80053 COMPREHEN METABOLIC PANEL: CPT | Performed by: PHYSICIAN ASSISTANT

## 2023-10-10 PROCEDURE — 99284 EMERGENCY DEPT VISIT MOD MDM: CPT

## 2023-10-10 PROCEDURE — 81001 URINALYSIS AUTO W/SCOPE: CPT | Performed by: PHYSICIAN ASSISTANT

## 2023-10-10 PROCEDURE — 25010000002 DIPHENHYDRAMINE PER 50 MG: Performed by: STUDENT IN AN ORGANIZED HEALTH CARE EDUCATION/TRAINING PROGRAM

## 2023-10-10 RX ORDER — OXYCODONE AND ACETAMINOPHEN 10; 325 MG/1; MG/1
1 TABLET ORAL ONCE
Status: COMPLETED | OUTPATIENT
Start: 2023-10-10 | End: 2023-10-10

## 2023-10-10 RX ORDER — ONDANSETRON 4 MG/1
4 TABLET, ORALLY DISINTEGRATING ORAL EVERY 6 HOURS PRN
Status: DISCONTINUED | OUTPATIENT
Start: 2023-10-10 | End: 2023-10-11 | Stop reason: HOSPADM

## 2023-10-10 RX ORDER — NITROFURANTOIN 25; 75 MG/1; MG/1
100 CAPSULE ORAL 2 TIMES DAILY
Qty: 13 CAPSULE | Refills: 0 | Status: SHIPPED | OUTPATIENT
Start: 2023-10-10

## 2023-10-10 RX ORDER — ACETAMINOPHEN 500 MG
1000 TABLET ORAL ONCE
Status: DISCONTINUED | OUTPATIENT
Start: 2023-10-10 | End: 2023-10-10

## 2023-10-10 RX ORDER — DIPHENHYDRAMINE HYDROCHLORIDE 50 MG/ML
25 INJECTION INTRAMUSCULAR; INTRAVENOUS ONCE
Status: COMPLETED | OUTPATIENT
Start: 2023-10-10 | End: 2023-10-10

## 2023-10-10 RX ORDER — OXYCODONE HYDROCHLORIDE AND ACETAMINOPHEN 5; 325 MG/1; MG/1
1 TABLET ORAL EVERY 6 HOURS PRN
Qty: 2 TABLET | Refills: 0 | Status: SHIPPED | OUTPATIENT
Start: 2023-10-10

## 2023-10-10 RX ORDER — NITROFURANTOIN 25; 75 MG/1; MG/1
100 CAPSULE ORAL ONCE
Status: COMPLETED | OUTPATIENT
Start: 2023-10-10 | End: 2023-10-10

## 2023-10-10 RX ADMIN — DIPHENHYDRAMINE HYDROCHLORIDE 25 MG: 50 INJECTION INTRAMUSCULAR; INTRAVENOUS at 21:49

## 2023-10-10 RX ADMIN — OXYCODONE AND ACETAMINOPHEN 1 TABLET: 10; 325 TABLET ORAL at 22:02

## 2023-10-10 RX ADMIN — NITROFURANTOIN MONOHYDRATE/MACROCRYSTALLINE 100 MG: 25; 75 CAPSULE ORAL at 23:22

## 2023-10-10 RX ADMIN — ONDANSETRON 4 MG: 4 TABLET, ORALLY DISINTEGRATING ORAL at 20:09

## 2023-10-10 NOTE — ED NOTES
MEDICAL SCREENING:    Reason for Visit: hematuria     Patient initially seen in triage.  The patient was advised further evaluation and diagnostic testing will be needed, some of the treatment and testing will be initiated in the lobby in order to begin the process.  The patient will be returned to the waiting area for the time being and possibly be re-assessed by a subsequent ED provider.  The patient will be brought back to the treatment area in as timely manner as possible.      Xenia Garza PA  10/10/23 1703

## 2023-10-11 NOTE — ED PROVIDER NOTES
Subjective   History of Present Illness  48-year-old female with a past medical history of Buerger's disease, bipolar disorder, and urethral strictures presents to the ER due to concerns for left-sided flank pain.  Symptoms been present for the last several days.  Noted increased pain with urination and hematuria.  Patient noted she sees urology every 3 to 4 weeks for possible urethral dilation if necessary.  No obvious aggravating or alleviating factors.  Vital signs stable.  Afebrile      Review of Systems   Genitourinary:  Positive for dysuria, flank pain and hematuria.   All other systems reviewed and are negative.      Past Medical History:   Diagnosis Date    Hoyos's disease     Bipolar 1 disorder     Constipation     DDD (degenerative disc disease), cervical 05/29/2017    Fibromyalgia     IBS (irritable bowel syndrome)     Meningioma     Migraine     PONV (postoperative nausea and vomiting)     PTSD (post-traumatic stress disorder)     Rectal bleeding        Allergies   Allergen Reactions    Ativan [Lorazepam] Hallucinations     confusion    Sulfa Antibiotics Shortness Of Breath and Swelling    Sulfa Antibiotics Anaphylaxis    Reglan [Metoclopramide] Angioedema    Compazine [Prochlorperazine Edisylate] Hives    Demerol [Meperidine] Hives    Droperidol Itching    Metoclopramide Swelling    Toradol [Ketorolac Tromethamine] Hives and Itching    Toradol [Ketorolac Tromethamine] Hives    Zosyn [Piperacillin Sod-Tazobactam So] Hives       Past Surgical History:   Procedure Laterality Date    ANAL SCOPE N/A 7/28/2016    Procedure: ANAL SCOPE;  Surgeon: Kael Lopez MD;  Location: [facility] OR;  Service:     APPENDECTOMY      COLONOSCOPY N/A 6/30/2016    Procedure: COLONOSCOPY  CPTCODE:81276;  Surgeon: Jose Antonio Belle III, MD;  Location: [facility] OR;  Service:     COLONOSCOPY N/A 7/7/2016    Procedure: COLONOSCOPY (70844) CPT;  Surgeon: Jose Antonio Belle III, MD;  Location: [facility] OR;  Service:     CYSTOSCOPY  RETROGRADE PYELOGRAM Bilateral 2017    Procedure: CYSTOSCOPY RETROGRADE PYELOGRAM;  Surgeon: Mu Blunt MD;  Location: [facility] OR;  Service:     ENDOSCOPY N/A 2016    Procedure: ESOPHAGOGASTRODUODENOSCOPY WITH BIOPSY  CPTCODE:19017;  Surgeon: Jose Antonio Belle III, MD;  Location: [facility] OR;  Service:     HEMORRHOIDECTOMY N/A 2016    Procedure: HEMORRHOID STAPLING;  Surgeon: Kael Lopez MD;  Location: [facility] OR;  Service:     HYSTERECTOMY      KNEE SURGERY      LAPAROSCOPIC SALPINGOOPHERECTOMY      PORTACATH PLACEMENT N/A 2017    Procedure: INSERTION OF PORTACATH;  Surgeon: Celso Arredondo MD;  Location: [facility] OR;  Service:     SHOULDER SURGERY      3 times       Family History   Problem Relation Age of Onset    Crohn's disease Other     Hypertension Other     Diabetes Other     Irritable bowel syndrome Other     No Known Problems Father     No Known Problems Mother        Social History     Socioeconomic History    Marital status:    Tobacco Use    Smoking status: Former     Packs/day: 1.00     Years: 20.00     Additional pack years: 0.00     Total pack years: 20.00     Types: Cigarettes     Quit date: 2015     Years since quittin.9     Passive exposure: Past    Smokeless tobacco: Never   Vaping Use    Vaping Use: Never used   Substance and Sexual Activity    Alcohol use: No    Drug use: Yes     Types: Marijuana     Comment: for pain    Sexual activity: Defer     Birth control/protection: Surgical           Objective   Physical Exam  Constitutional:       General: She is not in acute distress.     Appearance: Normal appearance. She is not ill-appearing.   HENT:      Head: Normocephalic and atraumatic.      Right Ear: External ear normal.      Left Ear: External ear normal.      Nose: Nose normal.      Mouth/Throat:      Mouth: Mucous membranes are moist.   Eyes:      Extraocular Movements: Extraocular movements intact.      Pupils: Pupils are equal, round,  and reactive to light.   Cardiovascular:      Rate and Rhythm: Normal rate and regular rhythm.      Heart sounds: No murmur heard.  Pulmonary:      Effort: Pulmonary effort is normal. No respiratory distress.      Breath sounds: Normal breath sounds. No wheezing.   Abdominal:      General: Bowel sounds are normal.      Palpations: Abdomen is soft.      Tenderness: There is no abdominal tenderness. There is left CVA tenderness.   Musculoskeletal:         General: No deformity or signs of injury. Normal range of motion.      Cervical back: Normal range of motion and neck supple.   Skin:     General: Skin is warm and dry.      Findings: No erythema.   Neurological:      General: No focal deficit present.      Mental Status: She is alert and oriented to person, place, and time. Mental status is at baseline.      Cranial Nerves: No cranial nerve deficit.   Psychiatric:         Mood and Affect: Mood normal.         Behavior: Behavior normal.         Thought Content: Thought content normal.         Procedures           ED Course      CT Abdomen Pelvis Stone Protocol    Result Date: 10/10/2023  1.  No acute abnormality of the abdomen or pelvis.  There is no nephrolithiasis or ureterolithiasis. 2.  Incidental findings, as above, similar in appearance to 3/16/2023.   This report was finalized on 10/10/2023 11:05 PM by Steph Fabian MD.       Results for orders placed or performed during the hospital encounter of 10/10/23   Comprehensive Metabolic Panel    Specimen: Blood   Result Value Ref Range    Glucose 87 65 - 99 mg/dL    BUN 9 6 - 20 mg/dL    Creatinine 0.62 0.57 - 1.00 mg/dL    Sodium 139 136 - 145 mmol/L    Potassium 4.2 3.5 - 5.2 mmol/L    Chloride 104 98 - 107 mmol/L    CO2 27.3 22.0 - 29.0 mmol/L    Calcium 9.5 8.6 - 10.5 mg/dL    Total Protein 7.1 6.0 - 8.5 g/dL    Albumin 4.4 3.5 - 5.2 g/dL    ALT (SGPT) 36 (H) 1 - 33 U/L    AST (SGOT) 32 1 - 32 U/L    Alkaline Phosphatase 67 39 - 117 U/L    Total Bilirubin 0.4  0.0 - 1.2 mg/dL    Globulin 2.7 gm/dL    A/G Ratio 1.6 g/dL    BUN/Creatinine Ratio 14.5 7.0 - 25.0    Anion Gap 7.7 5.0 - 15.0 mmol/L    eGFR 110.0 >60.0 mL/min/1.73   Urinalysis With Culture If Indicated - Urine, Clean Catch    Specimen: Urine, Clean Catch   Result Value Ref Range    Color, UA Red (A) Yellow, Straw    Appearance, UA Clear Clear    pH, UA 7.5 5.0 - 8.0    Specific Gravity, UA 1.006 1.005 - 1.030    Glucose, UA Negative Negative    Ketones, UA Negative Negative    Bilirubin, UA Negative Negative    Blood, UA Large (3+) (A) Negative    Protein,  mg/dL (2+) (A) Negative    Leuk Esterase, UA Trace (A) Negative    Nitrite, UA Negative Negative    Urobilinogen, UA 0.2 E.U./dL 0.2 - 1.0 E.U./dL   Valproic Acid Level, Total    Specimen: Blood   Result Value Ref Range    Valproic Acid <2.8 (L) 50.0 - 125.0 mcg/mL   CBC Auto Differential    Specimen: Blood   Result Value Ref Range    WBC 5.46 3.40 - 10.80 10*3/mm3    RBC 5.11 3.77 - 5.28 10*6/mm3    Hemoglobin 15.8 12.0 - 15.9 g/dL    Hematocrit 49.0 (H) 34.0 - 46.6 %    MCV 95.9 79.0 - 97.0 fL    MCH 30.9 26.6 - 33.0 pg    MCHC 32.2 31.5 - 35.7 g/dL    RDW 13.8 12.3 - 15.4 %    RDW-SD 49.1 37.0 - 54.0 fl    MPV 10.0 6.0 - 12.0 fL    Platelets 147 140 - 450 10*3/mm3    Neutrophil % 51.4 42.7 - 76.0 %    Lymphocyte % 39.0 19.6 - 45.3 %    Monocyte % 6.8 5.0 - 12.0 %    Eosinophil % 2.4 0.3 - 6.2 %    Basophil % 0.2 0.0 - 1.5 %    Immature Grans % 0.2 0.0 - 0.5 %    Neutrophils, Absolute 2.81 1.70 - 7.00 10*3/mm3    Lymphocytes, Absolute 2.13 0.70 - 3.10 10*3/mm3    Monocytes, Absolute 0.37 0.10 - 0.90 10*3/mm3    Eosinophils, Absolute 0.13 0.00 - 0.40 10*3/mm3    Basophils, Absolute 0.01 0.00 - 0.20 10*3/mm3    Immature Grans, Absolute 0.01 0.00 - 0.05 10*3/mm3    nRBC 0.0 0.0 - 0.2 /100 WBC   Urinalysis, Microscopic Only - Urine, Clean Catch    Specimen: Urine, Clean Catch   Result Value Ref Range    RBC, UA Too Numerous to Count (A) None Seen, 0-2  /HPF    WBC, UA 3-5 (A) None Seen, 0-2 /HPF    Bacteria, UA None Seen None Seen /HPF    Squamous Epithelial Cells, UA 0-2 None Seen, 0-2 /HPF    Hyaline Casts, UA None Seen None Seen /LPF    Methodology Automated Microscopy                                           Medical Decision Making  CBC and CMP unremarkable.  Urinalysis does note signs for hematuria and possible UTI.  Macrobid given.  CT imaging of the abdomen pelvis no findings consistent with previous CT imaging.  No acute abnormalities noted.  Pain improved with pain medication.  Patient will continue antibiotics at discharge.  Work up and results were discussed throughly with the patient.  The patient will be discharged for further monitoring and management with their PCP.  Red flags, warning signs, worsening symptoms, and when to return to the ER discussed with and understood by the patient.  Patient will follow up with their PCP in a timely manner.  Vitals stable at discharge.    Problems Addressed:  Acute UTI: complicated acute illness or injury  Stricture of female urethra, unspecified stricture type: complicated acute illness or injury    Amount and/or Complexity of Data Reviewed  Labs: ordered. Decision-making details documented in ED Course.  Radiology: ordered. Decision-making details documented in ED Course.    Risk  Prescription drug management.        Final diagnoses:   Acute UTI   Stricture of female urethra, unspecified stricture type       ED Disposition  ED Disposition       ED Disposition   Discharge    Condition   Stable    Comment   --               Mabel Patterson, APRN  [address] [address]  [phone]    In 1 week      [facility] EMERGENCY DEPARTMENT  [address] [place] [id number] [phone]    If symptoms worsen         Medication List        New Prescriptions      nitrofurantoin (macrocrystal-monohydrate) 100 MG capsule  Commonly known as: MACROBID  Take 1 capsule by mouth 2  (Two) Times a Day.            Changed      * oxyCODONE-acetaminophen 5-325 MG per tablet  Commonly known as: PERCOCET  Take 1 tablet by mouth Every 8 (Eight) Hours As Needed for Moderate Pain.  What changed: Another medication with the same name was added. Make sure you understand how and when to take each.     * oxyCODONE-acetaminophen  MG per tablet  Commonly known as: Percocet  Take 1 tablet by mouth Every 6 (Six) Hours As Needed for Moderate Pain.  What changed: Another medication with the same name was added. Make sure you understand how and when to take each.     * oxyCODONE-acetaminophen  MG per tablet  Commonly known as: Percocet  Take 1 tablet by mouth Every 6 (Six) Hours As Needed for Moderate Pain.  What changed: Another medication with the same name was added. Make sure you understand how and when to take each.     * oxyCODONE-acetaminophen 5-325 MG per tablet  Commonly known as: PERCOCET  Take 1 tablet by mouth Every 6 (Six) Hours As Needed for Moderate Pain.  What changed: You were already taking a medication with the same name, and this prescription was added. Make sure you understand how and when to take each.           * This list has 4 medication(s) that are the same as other medications prescribed for you. Read the directions carefully, and ask your doctor or other care provider to review them with you.                   Where to Get Your Medications        These medications were sent to [address] - [phone] [facility] [phone]   [address] [place] [address]      Phone: [phone]   nitrofurantoin (macrocrystal-monohydrate) 100 MG capsule  oxyCODONE-acetaminophen 5-325 MG per tablet            Jean Paul Hussein DO  10/10/23 [id number]       Jean Paul Hussein, DO  10/10/23 [id number]

## 2023-10-17 ENCOUNTER — OFFICE VISIT (OUTPATIENT)
Dept: UROLOGY | Facility: CLINIC | Age: 48
End: 2023-10-17
Payer: COMMERCIAL

## 2023-10-17 VITALS
HEIGHT: 68 IN | BODY MASS INDEX: 15.16 KG/M2 | WEIGHT: 100 LBS | SYSTOLIC BLOOD PRESSURE: 110 MMHG | DIASTOLIC BLOOD PRESSURE: 82 MMHG | HEART RATE: 133 BPM

## 2023-10-17 DIAGNOSIS — N35.028 OTHER POST-TRAUMATIC URETHRAL STRICTURE, FEMALE: ICD-10-CM

## 2023-10-17 DIAGNOSIS — Z48.816 AFTERCARE FOLLOWING SURGERY OF THE GENITOURINARY SYSTEM: Primary | ICD-10-CM

## 2023-10-17 DIAGNOSIS — N23 RENAL COLIC: ICD-10-CM

## 2023-10-17 RX ORDER — GENTAMICIN SULFATE 40 MG/ML
80 INJECTION, SOLUTION INTRAMUSCULAR; INTRAVENOUS ONCE
Status: COMPLETED | OUTPATIENT
Start: 2023-10-17 | End: 2023-10-17

## 2023-10-17 RX ORDER — OXYCODONE AND ACETAMINOPHEN 10; 325 MG/1; MG/1
1 TABLET ORAL EVERY 6 HOURS PRN
Qty: 20 TABLET | Refills: 0 | Status: SHIPPED | OUTPATIENT
Start: 2023-10-17

## 2023-10-17 RX ADMIN — GENTAMICIN SULFATE 80 MG: 40 INJECTION, SOLUTION INTRAMUSCULAR; INTRAVENOUS at 09:55

## 2023-10-17 NOTE — PROGRESS NOTES
Chief Complaint:      Chief Complaint   Patient presents with    Other post-traumatic urethral stricture, female       HPI:   48 y.o. female here for dilation    Past Medical History:     Past Medical History:   Diagnosis Date    Hoyos's disease     Bipolar 1 disorder     Constipation     DDD (degenerative disc disease), cervical 05/29/2017    Fibromyalgia     IBS (irritable bowel syndrome)     Meningioma     Migraine     PONV (postoperative nausea and vomiting)     PTSD (post-traumatic stress disorder)     Rectal bleeding        Current Meds:     Current Outpatient Medications   Medication Sig Dispense Refill    albuterol (PROVENTIL HFA;VENTOLIN HFA) 108 (90 Base) MCG/ACT inhaler Inhale 2 puffs 2 (Two) Times a Day.      Azelastine HCl 137 MCG/SPRAY solution 1 spray into the nostril(s) as directed by provider As Needed (Allergies).      busPIRone (BUSPAR) 15 MG tablet Take 1 tablet by mouth 3 (Three) Times a Day.      diclofenac (VOLTAREN) 1 % gel gel       divalproex (DEPAKOTE) 500 MG DR tablet Take 1 tablet by mouth 3 (Three) Times a Day. 90 tablet 2    docusate sodium (COLACE) 100 MG capsule Take 1 capsule by mouth 2 (Two) Times a Day. 20 capsule 0    docusate sodium (COLACE) 100 MG capsule Take 1 capsule by mouth 2 (Two) Times a Day As Needed for Constipation. 60 capsule 0    fluticasone (VERAMYST) 27.5 MCG/SPRAY nasal spray 2 sprays into the nostril(s) as directed by provider Daily.      megestrol (MEGACE) 20 MG tablet Take 1 tablet by mouth Daily. 30 tablet 3    methylPREDNISolone (MEDROL) 4 MG dose pack Take as directed on package instructions. 21 tablet 0    metroNIDAZOLE (FLAGYL) 500 MG tablet Take 1 tablet by mouth 3 (Three) Times a Day. 30 tablet 0    montelukast (SINGULAIR) 10 MG tablet Take 1 tablet by mouth Every Night.      nitrofurantoin, macrocrystal-monohydrate, (MACROBID) 100 MG capsule Take 1 capsule by mouth 2 (Two) Times a Day. 13 capsule 0    ondansetron ODT (ZOFRAN-ODT) 4 MG disintegrating  tablet Place 2 tablets on the tongue Every 8 (Eight) Hours As Needed for Nausea or Vomiting. 30 tablet 0    oxyCODONE-acetaminophen (Percocet)  MG per tablet Take 1 tablet by mouth Every 6 (Six) Hours As Needed for Moderate Pain. 20 tablet 0    oxyCODONE-acetaminophen (PERCOCET) 5-325 MG per tablet Take 1 tablet by mouth Every 6 (Six) Hours As Needed for Moderate Pain. 2 tablet 0    pantoprazole (PROTONIX) 40 MG EC tablet Take 1 tablet by mouth 2 (Two) Times a Day As Needed.      phenazopyridine (PYRIDIUM) 100 MG tablet Take 1 tablet by mouth 3 (Three) Times a Day As Needed for Bladder Spasms. 30 tablet 1    polyethylene glycol (MIRALAX) 17 GM/SCOOP powder Take 17 g by mouth Daily. 225 g 0    promethazine (PHENERGAN) 25 MG tablet Take 1 tablet by mouth Every 6 (Six) Hours As Needed for Nausea or Vomiting. 21 tablet 5    promethazine (PHENERGAN) 25 MG tablet Take 1 tablet by mouth Every 6 (Six) Hours As Needed for Nausea or Vomiting. 28 tablet 4    sertraline (ZOLOFT) 100 MG tablet Take 1 tablet by mouth 2 (Two) Times a Day.      SUMAtriptan (IMITREX) 6 MG/0.5ML injection Inject prescribed dose at onset of headache. May repeat dose one time in 1 hour(s) if headache not relieved.      traMADol (ULTRAM) 50 MG tablet       oxyCODONE-acetaminophen (Percocet)  MG per tablet Take 1 tablet by mouth Every 6 (Six) Hours As Needed for Moderate Pain. (Patient not taking: Reported on 10/17/2023) 16 tablet 0    oxyCODONE-acetaminophen (PERCOCET) 5-325 MG per tablet Take 1 tablet by mouth Every 8 (Eight) Hours As Needed for Moderate Pain. (Patient not taking: Reported on 10/17/2023) 12 tablet 0    terazosin (HYTRIN) 2 MG capsule Take 1 capsule by mouth Every Night for 14 days. 14 capsule 0     No current facility-administered medications for this visit.        Allergies:      Allergies   Allergen Reactions    Ativan [Lorazepam] Hallucinations     confusion    Sulfa Antibiotics Shortness Of Breath and Swelling    Sulfa  Antibiotics Anaphylaxis    Reglan [Metoclopramide] Angioedema    Compazine [Prochlorperazine Edisylate] Hives    Demerol [Meperidine] Hives    Droperidol Itching    Metoclopramide Swelling    Toradol [Ketorolac Tromethamine] Hives and Itching    Toradol [Ketorolac Tromethamine] Hives    Zosyn [Piperacillin Sod-Tazobactam So] Hives        Past Surgical History:     Past Surgical History:   Procedure Laterality Date    ANAL SCOPE N/A 2016    Procedure: ANAL SCOPE;  Surgeon: Kael Lopez MD;  Location: [facility] OR;  Service:     APPENDECTOMY      COLONOSCOPY N/A 2016    Procedure: COLONOSCOPY  CPTCODE:78345;  Surgeon: Jose Antonio Belle III, MD;  Location: [facility] OR;  Service:     COLONOSCOPY N/A 2016    Procedure: COLONOSCOPY (99310) CPT;  Surgeon: Jose Antonio Belle III, MD;  Location: [facility] OR;  Service:     CYSTOSCOPY RETROGRADE PYELOGRAM Bilateral 2017    Procedure: CYSTOSCOPY RETROGRADE PYELOGRAM;  Surgeon: Mu Blunt MD;  Location: [facility] OR;  Service:     ENDOSCOPY N/A 2016    Procedure: ESOPHAGOGASTRODUODENOSCOPY WITH BIOPSY  CPTCODE:06651;  Surgeon: Jose Antonio Belle III, MD;  Location: [facility] OR;  Service:     HEMORRHOIDECTOMY N/A 2016    Procedure: HEMORRHOID STAPLING;  Surgeon: Kael Lopez MD;  Location: [facility] OR;  Service:     HYSTERECTOMY      KNEE SURGERY      LAPAROSCOPIC SALPINGOOPHERECTOMY      PORTACATH PLACEMENT N/A 2017    Procedure: INSERTION OF PORTACATH;  Surgeon: Celso Arredondo MD;  Location: [facility] OR;  Service:     SHOULDER SURGERY      3 times       Social History:     Social History     Socioeconomic History    Marital status:    Tobacco Use    Smoking status: Former     Packs/day: 1.00     Years: 20.00     Additional pack years: 0.00     Total pack years: 20.00     Types: Cigarettes     Quit date: 2015     Years since quittin.9     Passive exposure: Past    Smokeless tobacco: Never   Vaping Use    Vaping Use:  Never used   Substance and Sexual Activity    Alcohol use: No    Drug use: Yes     Types: Marijuana     Comment: for pain    Sexual activity: Defer     Birth control/protection: Surgical       Family History:     Family History   Problem Relation Age of Onset    Crohn's disease Other     Hypertension Other     Diabetes Other     Irritable bowel syndrome Other     No Known Problems Father     No Known Problems Mother        Review of Systems:     Review of Systems   Constitutional: Negative.  Negative for activity change, appetite change, chills, diaphoresis, fatigue and unexpected weight change.   HENT:  Negative for congestion, dental problem, drooling, ear discharge, ear pain, facial swelling, hearing loss, mouth sores, nosebleeds, postnasal drip, rhinorrhea, sinus pressure, sneezing, sore throat, tinnitus, trouble swallowing and voice change.    Eyes: Negative.  Negative for photophobia, pain, discharge, redness, itching and visual disturbance.   Respiratory: Negative.  Negative for apnea, cough, choking, chest tightness, shortness of breath, wheezing and stridor.    Cardiovascular: Negative.  Negative for chest pain, palpitations and leg swelling.   Gastrointestinal: Negative.  Negative for abdominal distention, abdominal pain, anal bleeding, blood in stool, constipation, diarrhea, nausea, rectal pain and vomiting.   Endocrine: Negative.  Negative for cold intolerance, heat intolerance, polydipsia, polyphagia and polyuria.   Musculoskeletal: Negative.  Negative for arthralgias, back pain, gait problem, joint swelling, myalgias, neck pain and neck stiffness.   Skin: Negative.  Negative for color change, pallor, rash and wound.   Allergic/Immunologic: Negative.  Negative for environmental allergies, food allergies and immunocompromised state.   Neurological: Negative.  Negative for dizziness, tremors, seizures, syncope, facial asymmetry, speech difficulty, weakness, light-headedness, numbness and headaches.    Hematological: Negative.  Negative for adenopathy. Does not bruise/bleed easily.   Psychiatric/Behavioral:  Negative for agitation, behavioral problems, confusion, decreased concentration, dysphoric mood, hallucinations, self-injury, sleep disturbance and suicidal ideas. The patient is not nervous/anxious and is not hyperactive.    All other systems reviewed and are negative.      Physical Exam:     Physical Exam  Constitutional:       Appearance: She is well-developed.   HENT:      Head: Normocephalic and atraumatic.      Right Ear: External ear normal.      Left Ear: External ear normal.   Eyes:      Conjunctiva/sclera: Conjunctivae normal.      Pupils: Pupils are equal, round, and reactive to light.   Cardiovascular:      Rate and Rhythm: Normal rate and regular rhythm.      Heart sounds: Normal heart sounds.   Pulmonary:      Effort: Pulmonary effort is normal.      Breath sounds: Normal breath sounds.   Abdominal:      General: Bowel sounds are normal. There is no distension.      Palpations: Abdomen is soft. There is no mass.      Tenderness: There is no abdominal tenderness. There is no guarding or rebound.   Genitourinary:     General: Normal vulva.      Vagina: No vaginal discharge.   Musculoskeletal:         General: Normal range of motion.   Skin:     General: Skin is warm and dry.   Neurological:      Mental Status: She is alert.      Deep Tendon Reflexes: Reflexes are normal and symmetric.   Psychiatric:         Behavior: Behavior normal.         Thought Content: Thought content normal.         Judgment: Judgment normal.         I have reviewed the following portions of the patient's history: Allergies, current medications, past family history, past medical history, past social history, past surgical history, problem list, and ROS and confirm it is accurate.    Recent Image (CT and/or KUB):      CT Abdomen and Pelvis: No results found for this or any previous visit.       CT Stone Protocol: Results  for orders placed during the hospital encounter of 10/10/23    CT Abdomen Pelvis Stone Protocol    Narrative  CT ABDOMEN PELVIS STONE PROTOCOL-: 10/10/2023 9:19 PM    REASON FOR EXAM: Flank pain, kidney stone suspected LT FLANK PAIN.    TECHNIQUE: Noncontrast 4 mm axial sections with sagittal and coronal  reformats.    COMPARISON: 3/16/2023.    FINDINGS: There is focal bronchiectasis within the right lower lobe  (image 4/59), now with probable mucous plugging.  Noting the noncontrast  technique, the liver is unremarkable.  No focal liver lesions are  identified.  Best demonstrated on narrow windows, there is the  suggestion of cholelithiasis dependently within the gallbladder.  There  is no biliary ductal dilatation.  The spleen is at the upper limits of  normal in size.  The pancreas, adrenal glands and kidneys are within  normal limits, noting a probable cyst within the lower pole of the right  kidney (image 3/37).  There is no nephrolithiasis.  There is no abnormal  dilatation of either renal collecting system or ureter.  There is no  ureterolithiasis.  The abdominal aorta is normal in caliber, noting mild  vascular calcification.  There is a surgical anastomosis at the  rectoanal junction.  No complication is evident.  There are no findings  of bowel obstruction.  Noting the lack of enteric contrast material,  there is no abnormal bowel wall thickening.  The appendix is not  identified, however there are no findings of appendicitis.  There is no  free intraperitoneal fluid.  The uterus is absent.  The ovaries are not  identified.  There are no abnormal adnexal masses.  There is no lymph  node enlargement.  The bladder is unremarkable.  There is an old  fracture of L2.    Impression  1.  No acute abnormality of the abdomen or pelvis.  There is no  nephrolithiasis or ureterolithiasis.  2.  Incidental findings, as above, similar in appearance to 3/16/2023.      This report was finalized on 10/10/2023 11:05 PM by  Steph Fabian MD.       KUB: Results for orders placed during the hospital encounter of 01/10/23    XR Abdomen KUB    Narrative  EXAM:  XR Abdomen, 1 View    EXAM DATE:  1/10/2023 12:05 PM    CLINICAL HISTORY:  flank pain    TECHNIQUE:  Frontal supine view of the abdomen/pelvis.    COMPARISON:  10/01/2022    FINDINGS:  Gastrointestinal tract:  Unremarkable.  No dilation.  Organs:  No stones identified along the expected course of ureters.  Bones/joints:  Unremarkable.  Soft tissues:  Surgical staples in the lower pelvis.  Vasculature:  Stable phleboliths lower pelvis.    Impression  1.  No acute findings radiographically evident.  2.  No radiographic evidence of renal or ureteral stones.    This report was finalized on 1/10/2023 12:47 PM by Dr. Ankit Naik MD.       Labs (past 3 months):      Admission on 10/10/2023, Discharged on 10/10/2023   Component Date Value Ref Range Status    Glucose 10/10/2023 87  65 - 99 mg/dL Final    BUN 10/10/2023 9  6 - 20 mg/dL Final    Creatinine 10/10/2023 0.62  0.57 - 1.00 mg/dL Final    Sodium 10/10/2023 139  136 - 145 mmol/L Final    Potassium 10/10/2023 4.2  3.5 - 5.2 mmol/L Final    Chloride 10/10/2023 104  98 - 107 mmol/L Final    CO2 10/10/2023 27.3  22.0 - 29.0 mmol/L Final    Calcium 10/10/2023 9.5  8.6 - 10.5 mg/dL Final    Total Protein 10/10/2023 7.1  6.0 - 8.5 g/dL Final    Albumin 10/10/2023 4.4  3.5 - 5.2 g/dL Final    ALT (SGPT) 10/10/2023 36 (H)  1 - 33 U/L Final    AST (SGOT) 10/10/2023 32  1 - 32 U/L Final    Alkaline Phosphatase 10/10/2023 67  39 - 117 U/L Final    Total Bilirubin 10/10/2023 0.4  0.0 - 1.2 mg/dL Final    Globulin 10/10/2023 2.7  gm/dL Final    A/G Ratio 10/10/2023 1.6  g/dL Final    BUN/Creatinine Ratio 10/10/2023 14.5  7.0 - 25.0 Final    Anion Gap 10/10/2023 7.7  5.0 - 15.0 mmol/L Final    eGFR 10/10/2023 110.0  >60.0 mL/min/1.73 Final    Color, UA 10/10/2023 Red (A)  Yellow, Straw Final    Dipstick results may be inaccurate due to color  interference.        Appearance, UA 10/10/2023 Clear  Clear Final    pH, UA 10/10/2023 7.5  5.0 - 8.0 Final    Specific Gravity, UA 10/10/2023 1.006  1.005 - 1.030 Final    Glucose, UA 10/10/2023 Negative  Negative Final    Ketones, UA 10/10/2023 Negative  Negative Final    Bilirubin, UA 10/10/2023 Negative  Negative Final    Blood, UA 10/10/2023 Large (3+) (A)  Negative Final    Protein, UA 10/10/2023 100 mg/dL (2+) (A)  Negative Final    Leuk Esterase, UA 10/10/2023 Trace (A)  Negative Final    Nitrite, UA 10/10/2023 Negative  Negative Final    Urobilinogen, UA 10/10/2023 0.2 E.U./dL  0.2 - 1.0 E.U./dL Final    Valproic Acid 10/10/2023 <2.8 (L)  50.0 - 125.0 mcg/mL Final    WBC 10/10/2023 5.46  3.40 - 10.80 10*3/mm3 Final    RBC 10/10/2023 5.11  3.77 - 5.28 10*6/mm3 Final    Hemoglobin 10/10/2023 15.8  12.0 - 15.9 g/dL Final    Hematocrit 10/10/2023 49.0 (H)  34.0 - 46.6 % Final    MCV 10/10/2023 95.9  79.0 - 97.0 fL Final    MCH 10/10/2023 30.9  26.6 - 33.0 pg Final    MCHC 10/10/2023 32.2  31.5 - 35.7 g/dL Final    RDW 10/10/2023 13.8  12.3 - 15.4 % Final    RDW-SD 10/10/2023 49.1  37.0 - 54.0 fl Final    MPV 10/10/2023 10.0  6.0 - 12.0 fL Final    Platelets 10/10/2023 147  140 - 450 10*3/mm3 Final    Neutrophil % 10/10/2023 51.4  42.7 - 76.0 % Final    Lymphocyte % 10/10/2023 39.0  19.6 - 45.3 % Final    Monocyte % 10/10/2023 6.8  5.0 - 12.0 % Final    Eosinophil % 10/10/2023 2.4  0.3 - 6.2 % Final    Basophil % 10/10/2023 0.2  0.0 - 1.5 % Final    Immature Grans % 10/10/2023 0.2  0.0 - 0.5 % Final    Neutrophils, Absolute 10/10/2023 2.81  1.70 - 7.00 10*3/mm3 Final    Lymphocytes, Absolute 10/10/2023 2.13  0.70 - 3.10 10*3/mm3 Final    Monocytes, Absolute 10/10/2023 0.37  0.10 - 0.90 10*3/mm3 Final    Eosinophils, Absolute 10/10/2023 0.13  0.00 - 0.40 10*3/mm3 Final    Basophils, Absolute 10/10/2023 0.01  0.00 - 0.20 10*3/mm3 Final    Immature Grans, Absolute 10/10/2023 0.01  0.00 - 0.05 10*3/mm3 Final    nRBC  10/10/2023 0.0  0.0 - 0.2 /100 WBC Final    RBC, UA 10/10/2023 Too Numerous to Count (A)  None Seen, 0-2 /HPF Final    WBC, UA 10/10/2023 3-5 (A)  None Seen, 0-2 /HPF Final    Urine culture not indicated.    Bacteria, UA 10/10/2023 None Seen  None Seen /HPF Final    Squamous Epithelial Cells, UA 10/10/2023 0-2  None Seen, 0-2 /HPF Final    Hyaline Casts, UA 10/10/2023 None Seen  None Seen /LPF Final    Methodology 10/10/2023 Automated Microscopy   Final   Admission on 09/17/2023, Discharged on 09/17/2023   Component Date Value Ref Range Status    THC, Screen, Urine 09/17/2023 Positive (A)  Negative Final    Phencyclidine (PCP), Urine 09/17/2023 Negative  Negative Final    Cocaine Screen, Urine 09/17/2023 Negative  Negative Final    Methamphetamine, Ur 09/17/2023 Negative  Negative Final    Opiate Screen 09/17/2023 Negative  Negative Final    Amphetamine Screen, Urine 09/17/2023 Negative  Negative Final    Benzodiazepine Screen, Urine 09/17/2023 Negative  Negative Final    Tricyclic Antidepressants Screen 09/17/2023 Negative  Negative Final    Methadone Screen, Urine 09/17/2023 Negative  Negative Final    Barbiturates Screen, Urine 09/17/2023 Negative  Negative Final    Oxycodone Screen, Urine 09/17/2023 Negative  Negative Final    Propoxyphene Screen 09/17/2023 Negative  Negative Final    Buprenorphine, Screen, Urine 09/17/2023 Negative  Negative Final    Color, UA 09/17/2023 Fe (A)  Yellow, Straw Final    Appearance, UA 09/17/2023 Cloudy (A)  Clear Final    pH, UA 09/17/2023 7.5  5.0 - 8.0 Final    Specific Gravity, UA 09/17/2023 1.008  1.005 - 1.030 Final    Glucose, UA 09/17/2023 Negative  Negative Final    Ketones, UA 09/17/2023 Negative  Negative Final    Bilirubin, UA 09/17/2023 Negative  Negative Final    Blood, UA 09/17/2023 Large (3+) (A)  Negative Final    Protein, UA 09/17/2023 Negative  Negative Final    Leuk Esterase, UA 09/17/2023 Trace (A)  Negative Final    Nitrite, UA 09/17/2023 Negative   Negative Final    Urobilinogen, UA 09/17/2023 0.2 E.U./dL  0.2 - 1.0 E.U./dL Final    Glucose 09/17/2023 92  65 - 99 mg/dL Final    BUN 09/17/2023 5 (L)  6 - 20 mg/dL Final    Creatinine 09/17/2023 0.58  0.57 - 1.00 mg/dL Final    Sodium 09/17/2023 139  136 - 145 mmol/L Final    Potassium 09/17/2023 3.9  3.5 - 5.2 mmol/L Final    Chloride 09/17/2023 110 (H)  98 - 107 mmol/L Final    CO2 09/17/2023 22.2  22.0 - 29.0 mmol/L Final    Calcium 09/17/2023 8.4 (L)  8.6 - 10.5 mg/dL Final    Total Protein 09/17/2023 6.0  6.0 - 8.5 g/dL Final    Albumin 09/17/2023 3.7  3.5 - 5.2 g/dL Final    ALT (SGPT) 09/17/2023 17  1 - 33 U/L Final    AST (SGOT) 09/17/2023 20  1 - 32 U/L Final    Alkaline Phosphatase 09/17/2023 53  39 - 117 U/L Final    Total Bilirubin 09/17/2023 0.2  0.0 - 1.2 mg/dL Final    Globulin 09/17/2023 2.3  gm/dL Final    A/G Ratio 09/17/2023 1.6  g/dL Final    BUN/Creatinine Ratio 09/17/2023 8.6  7.0 - 25.0 Final    Anion Gap 09/17/2023 6.8  5.0 - 15.0 mmol/L Final    eGFR 09/17/2023 111.8  >60.0 mL/min/1.73 Final    WBC 09/17/2023 4.81  3.40 - 10.80 10*3/mm3 Final    RBC 09/17/2023 4.45  3.77 - 5.28 10*6/mm3 Final    Hemoglobin 09/17/2023 13.7  12.0 - 15.9 g/dL Final    Hematocrit 09/17/2023 41.7  34.0 - 46.6 % Final    MCV 09/17/2023 93.7  79.0 - 97.0 fL Final    MCH 09/17/2023 30.8  26.6 - 33.0 pg Final    MCHC 09/17/2023 32.9  31.5 - 35.7 g/dL Final    RDW 09/17/2023 13.0  12.3 - 15.4 % Final    RDW-SD 09/17/2023 44.2  37.0 - 54.0 fl Final    MPV 09/17/2023 10.3  6.0 - 12.0 fL Final    Platelets 09/17/2023 131 (L)  140 - 450 10*3/mm3 Final    Neutrophil % 09/17/2023 63.3  42.7 - 76.0 % Final    Lymphocyte % 09/17/2023 32.0  19.6 - 45.3 % Final    Monocyte % 09/17/2023 3.3 (L)  5.0 - 12.0 % Final    Eosinophil % 09/17/2023 1.0  0.3 - 6.2 % Final    Basophil % 09/17/2023 0.2  0.0 - 1.5 % Final    Immature Grans % 09/17/2023 0.2  0.0 - 0.5 % Final    Neutrophils, Absolute 09/17/2023 3.04  1.70 - 7.00 10*3/mm3  Final    Lymphocytes, Absolute 09/17/2023 1.54  0.70 - 3.10 10*3/mm3 Final    Monocytes, Absolute 09/17/2023 0.16  0.10 - 0.90 10*3/mm3 Final    Eosinophils, Absolute 09/17/2023 0.05  0.00 - 0.40 10*3/mm3 Final    Basophils, Absolute 09/17/2023 0.01  0.00 - 0.20 10*3/mm3 Final    Immature Grans, Absolute 09/17/2023 0.01  0.00 - 0.05 10*3/mm3 Final    nRBC 09/17/2023 0.0  0.0 - 0.2 /100 WBC Final    Magnesium 09/17/2023 1.8  1.6 - 2.6 mg/dL Final    Fentanyl, Urine 09/17/2023 Negative  Negative Final    RBC, UA 09/17/2023 Too Numerous to Count (A)  None Seen, 0-2 /HPF Final    WBC, UA 09/17/2023 0-2  None Seen, 0-2 /HPF Final    Bacteria, UA 09/17/2023 None Seen  None Seen /HPF Final    Squamous Epithelial Cells, UA 09/17/2023 0-2  None Seen, 0-2 /HPF Final    Hyaline Casts, UA 09/17/2023 None Seen  None Seen /LPF Final    Methodology 09/17/2023 Automated Microscopy   Final   Admission on 08/26/2023, Discharged on 08/26/2023   Component Date Value Ref Range Status    Glucose 08/26/2023 74  65 - 99 mg/dL Final    BUN 08/26/2023 7  6 - 20 mg/dL Final    Creatinine 08/26/2023 0.50 (L)  0.57 - 1.00 mg/dL Final    Sodium 08/26/2023 140  136 - 145 mmol/L Final    Potassium 08/26/2023 3.9  3.5 - 5.2 mmol/L Final    Chloride 08/26/2023 106  98 - 107 mmol/L Final    CO2 08/26/2023 23.6  22.0 - 29.0 mmol/L Final    Calcium 08/26/2023 9.0  8.6 - 10.5 mg/dL Final    Total Protein 08/26/2023 6.5  6.0 - 8.5 g/dL Final    Albumin 08/26/2023 4.1  3.5 - 5.2 g/dL Final    ALT (SGPT) 08/26/2023 36 (H)  1 - 33 U/L Final    AST (SGOT) 08/26/2023 29  1 - 32 U/L Final    Alkaline Phosphatase 08/26/2023 62  39 - 117 U/L Final    Total Bilirubin 08/26/2023 0.6  0.0 - 1.2 mg/dL Final    Globulin 08/26/2023 2.4  gm/dL Final    A/G Ratio 08/26/2023 1.7  g/dL Final    BUN/Creatinine Ratio 08/26/2023 14.0  7.0 - 25.0 Final    Anion Gap 08/26/2023 10.4  5.0 - 15.0 mmol/L Final    eGFR 08/26/2023 115.9  >60.0 mL/min/1.73 Final    Protime 08/26/2023  13.4  12.1 - 14.7 Seconds Final    INR 08/26/2023 0.97  0.90 - 1.10 Final    C-Reactive Protein 08/26/2023 <0.30  0.00 - 0.50 mg/dL Final    QT Interval 08/26/2023 366  ms Final    QTC Interval 08/26/2023 462  ms Final    Extra Tube 08/26/2023 Hold for add-ons.   Final    Auto resulted.    Extra Tube 08/26/2023 hold for add-on   Final    Auto resulted    Extra Tube 08/26/2023 Hold for add-ons.   Final    Auto resulted.    Extra Tube 08/26/2023 Hold for add-ons.   Final    Auto resulted    WBC 08/26/2023 5.32  3.40 - 10.80 10*3/mm3 Final    RBC 08/26/2023 5.04  3.77 - 5.28 10*6/mm3 Final    Hemoglobin 08/26/2023 15.3  12.0 - 15.9 g/dL Final    Hematocrit 08/26/2023 46.2  34.0 - 46.6 % Final    MCV 08/26/2023 91.7  79.0 - 97.0 fL Final    MCH 08/26/2023 30.4  26.6 - 33.0 pg Final    MCHC 08/26/2023 33.1  31.5 - 35.7 g/dL Final    RDW 08/26/2023 13.0  12.3 - 15.4 % Final    RDW-SD 08/26/2023 43.7  37.0 - 54.0 fl Final    MPV 08/26/2023 10.3  6.0 - 12.0 fL Final    Platelets 08/26/2023 150  140 - 450 10*3/mm3 Final    Neutrophil % 08/26/2023 44.9  42.7 - 76.0 % Final    Lymphocyte % 08/26/2023 44.0  19.6 - 45.3 % Final    Monocyte % 08/26/2023 7.9  5.0 - 12.0 % Final    Eosinophil % 08/26/2023 2.8  0.3 - 6.2 % Final    Basophil % 08/26/2023 0.2  0.0 - 1.5 % Final    Immature Grans % 08/26/2023 0.2  0.0 - 0.5 % Final    Neutrophils, Absolute 08/26/2023 2.39  1.70 - 7.00 10*3/mm3 Final    Lymphocytes, Absolute 08/26/2023 2.34  0.70 - 3.10 10*3/mm3 Final    Monocytes, Absolute 08/26/2023 0.42  0.10 - 0.90 10*3/mm3 Final    Eosinophils, Absolute 08/26/2023 0.15  0.00 - 0.40 10*3/mm3 Final    Basophils, Absolute 08/26/2023 0.01  0.00 - 0.20 10*3/mm3 Final    Immature Grans, Absolute 08/26/2023 0.01  0.00 - 0.05 10*3/mm3 Final    nRBC 08/26/2023 0.0  0.0 - 0.2 /100 WBC Final        Procedure:   Urethral dilation-after an appropriate informed consent, the patient was brought to the procedure suite.  The urethra was gently  anesthetized with 10 mL of 2% viscous Xylocaine jelly.  After an adequate period of topical anesthesia I went ahead and a the urethra was gently anesthetized and dilated with Reese sounds from 16 to 26 [place] sequentially without complication. The patient was given gentamicin as prophylaxis with 80 mg.    Assessment/Plan:   Posttraumatic urethral stricture status post dilation.  Narcotic pain medication-patient has significant acute pain that I believe would be an indication for the use of narcotic pain medication.  I discussed the significant risks of pain medication and the fact that this will be a short only option and I will give her no more than a three-day supply of pain medication, I will not plan long-term medication, and that this will be sent to a pain clinic if it at all becomes necessary.  We discussed signing a pain medication agreement and the fact that we're going to run a state [place] review to be sure the patient is not getting pain medication from elsewhere.  If this is the case, we will not give pain medication as part of the patient's treatment plan of there being prescribed a controlled substance with potential for abuse.  This patient has been well aware of the appropriate dose of such medications including the risks for somnolence, limited ability to drive and/or safety and the significant potential for overdose.  It has been made clear that these medications are for the prescribed patient only without concomitant use of alcohol or other substance unless prescribed by the medical provider.  Has completed prescribing agreement detailing the terms of continue prescribing him a controlled substance including monitoring Luis Alberto reports, the possibility of urine drug screens, and pill counts.  The patient is aware that we review LUIS ALBERTO reports on a regular basis and scan them into the chart.  History and physical examination exhibited continued safe and appropriate use of controlled substances.  We also discussed the fact that the new [place] legislation allows only a three-day prescription for pain medication.  In this situation he will be referred to a chronic pain clinic.        This document has been electronically signed by VERENICE FINK MD October 17, 2023 09:37 EDT    Dictated Utilizing Dragon Dictation: Part of this note may be an electronic transcription/translation of spoken language to printed text using the Dragon Dictation System.

## 2023-11-14 ENCOUNTER — OFFICE VISIT (OUTPATIENT)
Dept: UROLOGY | Facility: CLINIC | Age: 48
End: 2023-11-14
Payer: COMMERCIAL

## 2023-11-14 VITALS
HEIGHT: 68 IN | HEART RATE: 128 BPM | BODY MASS INDEX: 15.58 KG/M2 | DIASTOLIC BLOOD PRESSURE: 84 MMHG | SYSTOLIC BLOOD PRESSURE: 132 MMHG | WEIGHT: 102.8 LBS

## 2023-11-14 DIAGNOSIS — F31.9 BIPOLAR 1 DISORDER: ICD-10-CM

## 2023-11-14 DIAGNOSIS — Z48.816 AFTERCARE FOLLOWING SURGERY OF THE GENITOURINARY SYSTEM: Primary | ICD-10-CM

## 2023-11-14 DIAGNOSIS — R35.0 FREQUENCY OF MICTURITION: ICD-10-CM

## 2023-11-14 DIAGNOSIS — N35.028 OTHER POST-TRAUMATIC URETHRAL STRICTURE, FEMALE: ICD-10-CM

## 2023-11-14 DIAGNOSIS — N23 RENAL COLIC: ICD-10-CM

## 2023-11-14 RX ORDER — OXYCODONE AND ACETAMINOPHEN 10; 325 MG/1; MG/1
1 TABLET ORAL EVERY 6 HOURS PRN
Qty: 16 TABLET | Refills: 0 | Status: SHIPPED | OUTPATIENT
Start: 2023-11-14

## 2023-11-14 RX ORDER — PROMETHAZINE HYDROCHLORIDE 25 MG/1
25 TABLET ORAL EVERY 6 HOURS PRN
Qty: 21 TABLET | Refills: 5 | Status: SHIPPED | OUTPATIENT
Start: 2023-11-14

## 2023-11-14 RX ORDER — GENTAMICIN SULFATE 40 MG/ML
80 INJECTION, SOLUTION INTRAMUSCULAR; INTRAVENOUS ONCE
Status: COMPLETED | OUTPATIENT
Start: 2023-11-14 | End: 2023-11-14

## 2023-11-14 RX ORDER — MEGESTROL ACETATE 20 MG/1
20 TABLET ORAL DAILY
Qty: 30 TABLET | Refills: 3 | Status: SHIPPED | OUTPATIENT
Start: 2023-11-14

## 2023-11-14 RX ADMIN — GENTAMICIN SULFATE 80 MG: 40 INJECTION, SOLUTION INTRAMUSCULAR; INTRAVENOUS at 09:25

## 2023-11-14 NOTE — PROGRESS NOTES
Chief Complaint:      Chief Complaint   Patient presents with    Other post-traumatic urethral stricture, female       HPI:   48 y.o. female here for dilation    Past Medical History:     Past Medical History:   Diagnosis Date    Hoyos's disease     Bipolar 1 disorder     Constipation     DDD (degenerative disc disease), cervical 05/29/2017    Fibromyalgia     IBS (irritable bowel syndrome)     Meningioma     Migraine     PONV (postoperative nausea and vomiting)     PTSD (post-traumatic stress disorder)     Rectal bleeding        Current Meds:     Current Outpatient Medications   Medication Sig Dispense Refill    albuterol (PROVENTIL HFA;VENTOLIN HFA) 108 (90 Base) MCG/ACT inhaler Inhale 2 puffs 2 (Two) Times a Day.      Azelastine HCl 137 MCG/SPRAY solution 1 spray into the nostril(s) as directed by provider As Needed (Allergies).      busPIRone (BUSPAR) 15 MG tablet Take 1 tablet by mouth 3 (Three) Times a Day.      diclofenac (VOLTAREN) 1 % gel gel       divalproex (DEPAKOTE) 500 MG DR tablet Take 1 tablet by mouth 3 (Three) Times a Day. 90 tablet 2    docusate sodium (COLACE) 100 MG capsule Take 1 capsule by mouth 2 (Two) Times a Day. 20 capsule 0    docusate sodium (COLACE) 100 MG capsule Take 1 capsule by mouth 2 (Two) Times a Day As Needed for Constipation. 60 capsule 0    fluticasone (VERAMYST) 27.5 MCG/SPRAY nasal spray 2 sprays into the nostril(s) as directed by provider Daily.      megestrol (MEGACE) 20 MG tablet Take 1 tablet by mouth Daily. 30 tablet 3    methylPREDNISolone (MEDROL) 4 MG dose pack Take as directed on package instructions. 21 tablet 0    metroNIDAZOLE (FLAGYL) 500 MG tablet Take 1 tablet by mouth 3 (Three) Times a Day. 30 tablet 0    montelukast (SINGULAIR) 10 MG tablet Take 1 tablet by mouth Every Night.      nitrofurantoin, macrocrystal-monohydrate, (MACROBID) 100 MG capsule Take 1 capsule by mouth 2 (Two) Times a Day. 13 capsule 0    ondansetron ODT (ZOFRAN-ODT) 4 MG disintegrating  tablet Place 2 tablets on the tongue Every 8 (Eight) Hours As Needed for Nausea or Vomiting. 30 tablet 0    oxyCODONE-acetaminophen (Percocet)  MG per tablet Take 1 tablet by mouth Every 6 (Six) Hours As Needed for Moderate Pain. 16 tablet 0    oxyCODONE-acetaminophen (Percocet)  MG per tablet Take 1 tablet by mouth Every 6 (Six) Hours As Needed for Moderate Pain. 20 tablet 0    oxyCODONE-acetaminophen (PERCOCET) 5-325 MG per tablet Take 1 tablet by mouth Every 8 (Eight) Hours As Needed for Moderate Pain. 12 tablet 0    oxyCODONE-acetaminophen (PERCOCET) 5-325 MG per tablet Take 1 tablet by mouth Every 6 (Six) Hours As Needed for Moderate Pain. 2 tablet 0    pantoprazole (PROTONIX) 40 MG EC tablet Take 1 tablet by mouth 2 (Two) Times a Day As Needed.      phenazopyridine (PYRIDIUM) 100 MG tablet Take 1 tablet by mouth 3 (Three) Times a Day As Needed for Bladder Spasms. 30 tablet 1    polyethylene glycol (MIRALAX) 17 GM/SCOOP powder Take 17 g by mouth Daily. 225 g 0    promethazine (PHENERGAN) 25 MG tablet Take 1 tablet by mouth Every 6 (Six) Hours As Needed for Nausea or Vomiting. 21 tablet 5    promethazine (PHENERGAN) 25 MG tablet Take 1 tablet by mouth Every 6 (Six) Hours As Needed for Nausea or Vomiting. 28 tablet 4    sertraline (ZOLOFT) 100 MG tablet Take 1 tablet by mouth 2 (Two) Times a Day.      SUMAtriptan (IMITREX) 6 MG/0.5ML injection Inject prescribed dose at onset of headache. May repeat dose one time in 1 hour(s) if headache not relieved.      traMADol (ULTRAM) 50 MG tablet       terazosin (HYTRIN) 2 MG capsule Take 1 capsule by mouth Every Night for 14 days. 14 capsule 0     No current facility-administered medications for this visit.        Allergies:      Allergies   Allergen Reactions    Ativan [Lorazepam] Hallucinations     confusion    Sulfa Antibiotics Shortness Of Breath and Swelling    Sulfa Antibiotics Anaphylaxis    Reglan [Metoclopramide] Angioedema    Compazine  [Prochlorperazine Edisylate] Hives    Demerol [Meperidine] Hives    Droperidol Itching    Metoclopramide Swelling    Toradol [Ketorolac Tromethamine] Hives and Itching    Toradol [Ketorolac Tromethamine] Hives    Zosyn [Piperacillin Sod-Tazobactam So] Hives        Past Surgical History:     Past Surgical History:   Procedure Laterality Date    ANAL SCOPE N/A 2016    Procedure: ANAL SCOPE;  Surgeon: Kael Lopez MD;  Location: [facility] OR;  Service:     APPENDECTOMY      COLONOSCOPY N/A 2016    Procedure: COLONOSCOPY  CPTCODE:48766;  Surgeon: Jose Antonio Belle III, MD;  Location: [facility] OR;  Service:     COLONOSCOPY N/A 2016    Procedure: COLONOSCOPY (10992) CPT;  Surgeon: Jose Antonio Belle III, MD;  Location: [facility] OR;  Service:     CYSTOSCOPY RETROGRADE PYELOGRAM Bilateral 2017    Procedure: CYSTOSCOPY RETROGRADE PYELOGRAM;  Surgeon: Mu Blunt MD;  Location: [facility] OR;  Service:     ENDOSCOPY N/A 2016    Procedure: ESOPHAGOGASTRODUODENOSCOPY WITH BIOPSY  CPTCODE:40997;  Surgeon: Jose Antonio Belle III, MD;  Location: [facility] OR;  Service:     HEMORRHOIDECTOMY N/A 2016    Procedure: HEMORRHOID STAPLING;  Surgeon: Kael Lopez MD;  Location: [facility] OR;  Service:     HYSTERECTOMY      KNEE SURGERY      LAPAROSCOPIC SALPINGOOPHERECTOMY      PORTACATH PLACEMENT N/A 2017    Procedure: INSERTION OF PORTACATH;  Surgeon: Celso Arredondo MD;  Location: [facility] OR;  Service:     SHOULDER SURGERY      3 times       Social History:     Social History     Socioeconomic History    Marital status:    Tobacco Use    Smoking status: Former     Packs/day: 1.00     Years: 20.00     Additional pack years: 0.00     Total pack years: 20.00     Types: Cigarettes     Quit date: 2015     Years since quittin.0     Passive exposure: Past    Smokeless tobacco: Never   Vaping Use    Vaping Use: Never used   Substance and Sexual Activity    Alcohol use: No    Drug use:  Yes     Types: Marijuana     Comment: for pain    Sexual activity: Defer     Birth control/protection: Surgical       Family History:     Family History   Problem Relation Age of Onset    Crohn's disease Other     Hypertension Other     Diabetes Other     Irritable bowel syndrome Other     No Known Problems Father     No Known Problems Mother        Review of Systems:     Review of Systems   Constitutional: Negative.  Negative for activity change, appetite change, chills, diaphoresis, fatigue and unexpected weight change.   HENT:  Negative for congestion, dental problem, drooling, ear discharge, ear pain, facial swelling, hearing loss, mouth sores, nosebleeds, postnasal drip, rhinorrhea, sinus pressure, sneezing, sore throat, tinnitus, trouble swallowing and voice change.    Eyes: Negative.  Negative for photophobia, pain, discharge, redness, itching and visual disturbance.   Respiratory: Negative.  Negative for apnea, cough, choking, chest tightness, shortness of breath, wheezing and stridor.    Cardiovascular: Negative.  Negative for chest pain, palpitations and leg swelling.   Gastrointestinal: Negative.  Negative for abdominal distention, abdominal pain, anal bleeding, blood in stool, constipation, diarrhea, nausea, rectal pain and vomiting.   Endocrine: Negative.  Negative for cold intolerance, heat intolerance, polydipsia, polyphagia and polyuria.   Genitourinary:  Positive for difficulty urinating.   Musculoskeletal: Negative.  Negative for arthralgias, back pain, gait problem, joint swelling, myalgias, neck pain and neck stiffness.   Skin: Negative.  Negative for color change, pallor, rash and wound.   Allergic/Immunologic: Negative.  Negative for environmental allergies, food allergies and immunocompromised state.   Neurological: Negative.  Negative for dizziness, tremors, seizures, syncope, facial asymmetry, speech difficulty, weakness, light-headedness, numbness and headaches.   Hematological: Negative.   Negative for adenopathy. Does not bruise/bleed easily.   Psychiatric/Behavioral:  Negative for agitation, behavioral problems, confusion, decreased concentration, dysphoric mood, hallucinations, self-injury, sleep disturbance and suicidal ideas. The patient is not nervous/anxious and is not hyperactive.    All other systems reviewed and are negative.      Physical Exam:     Physical Exam  Constitutional:       Appearance: She is well-developed.   HENT:      Head: Normocephalic and atraumatic.      Right Ear: External ear normal.      Left Ear: External ear normal.   Eyes:      Conjunctiva/sclera: Conjunctivae normal.      Pupils: Pupils are equal, round, and reactive to light.   Cardiovascular:      Rate and Rhythm: Normal rate and regular rhythm.      Heart sounds: Normal heart sounds.   Pulmonary:      Effort: Pulmonary effort is normal.      Breath sounds: Normal breath sounds.   Abdominal:      General: Bowel sounds are normal. There is no distension.      Palpations: Abdomen is soft. There is no mass.      Tenderness: There is no abdominal tenderness. There is no guarding or rebound.   Genitourinary:     General: Normal vulva.      Vagina: No vaginal discharge.   Musculoskeletal:         General: Normal range of motion.   Skin:     General: Skin is warm and dry.   Neurological:      Mental Status: She is alert.      Deep Tendon Reflexes: Reflexes are normal and symmetric.   Psychiatric:         Behavior: Behavior normal.         Thought Content: Thought content normal.         Judgment: Judgment normal.         I have reviewed the following portions of the patient's history: Allergies, current medications, past family history, past medical history, past social history, past surgical history, problem list, and ROS and confirm it is accurate.  Recent Image (CT and/or KUB):      CT Abdomen and Pelvis: No results found for this or any previous visit.       CT Stone Protocol: Results for orders placed during the  hospital encounter of 10/10/23    CT Abdomen Pelvis Stone Protocol    Narrative  CT ABDOMEN PELVIS STONE PROTOCOL-: 10/10/2023 9:19 PM    REASON FOR EXAM: Flank pain, kidney stone suspected LT FLANK PAIN.    TECHNIQUE: Noncontrast 4 mm axial sections with sagittal and coronal  reformats.    COMPARISON: 3/16/2023.    FINDINGS: There is focal bronchiectasis within the right lower lobe  (image 4/59), now with probable mucous plugging.  Noting the noncontrast  technique, the liver is unremarkable.  No focal liver lesions are  identified.  Best demonstrated on narrow windows, there is the  suggestion of cholelithiasis dependently within the gallbladder.  There  is no biliary ductal dilatation.  The spleen is at the upper limits of  normal in size.  The pancreas, adrenal glands and kidneys are within  normal limits, noting a probable cyst within the lower pole of the right  kidney (image 3/37).  There is no nephrolithiasis.  There is no abnormal  dilatation of either renal collecting system or ureter.  There is no  ureterolithiasis.  The abdominal aorta is normal in caliber, noting mild  vascular calcification.  There is a surgical anastomosis at the  rectoanal junction.  No complication is evident.  There are no findings  of bowel obstruction.  Noting the lack of enteric contrast material,  there is no abnormal bowel wall thickening.  The appendix is not  identified, however there are no findings of appendicitis.  There is no  free intraperitoneal fluid.  The uterus is absent.  The ovaries are not  identified.  There are no abnormal adnexal masses.  There is no lymph  node enlargement.  The bladder is unremarkable.  There is an old  fracture of L2.    Impression  1.  No acute abnormality of the abdomen or pelvis.  There is no  nephrolithiasis or ureterolithiasis.  2.  Incidental findings, as above, similar in appearance to 3/16/2023.      This report was finalized on 10/10/2023 11:05 PM by Steph Fabian MD.       KUB:  Results for orders placed during the hospital encounter of 01/10/23    XR Abdomen KUB    Narrative  EXAM:  XR Abdomen, 1 View    EXAM DATE:  1/10/2023 12:05 PM    CLINICAL HISTORY:  flank pain    TECHNIQUE:  Frontal supine view of the abdomen/pelvis.    COMPARISON:  10/01/2022    FINDINGS:  Gastrointestinal tract:  Unremarkable.  No dilation.  Organs:  No stones identified along the expected course of ureters.  Bones/joints:  Unremarkable.  Soft tissues:  Surgical staples in the lower pelvis.  Vasculature:  Stable phleboliths lower pelvis.    Impression  1.  No acute findings radiographically evident.  2.  No radiographic evidence of renal or ureteral stones.    This report was finalized on 1/10/2023 12:47 PM by Dr. Ankit Naik MD.       Labs (past 3 months):      Admission on 10/10/2023, Discharged on 10/10/2023   Component Date Value Ref Range Status    Glucose 10/10/2023 87  65 - 99 mg/dL Final    BUN 10/10/2023 9  6 - 20 mg/dL Final    Creatinine 10/10/2023 0.62  0.57 - 1.00 mg/dL Final    Sodium 10/10/2023 139  136 - 145 mmol/L Final    Potassium 10/10/2023 4.2  3.5 - 5.2 mmol/L Final    Chloride 10/10/2023 104  98 - 107 mmol/L Final    CO2 10/10/2023 27.3  22.0 - 29.0 mmol/L Final    Calcium 10/10/2023 9.5  8.6 - 10.5 mg/dL Final    Total Protein 10/10/2023 7.1  6.0 - 8.5 g/dL Final    Albumin 10/10/2023 4.4  3.5 - 5.2 g/dL Final    ALT (SGPT) 10/10/2023 36 (H)  1 - 33 U/L Final    AST (SGOT) 10/10/2023 32  1 - 32 U/L Final    Alkaline Phosphatase 10/10/2023 67  39 - 117 U/L Final    Total Bilirubin 10/10/2023 0.4  0.0 - 1.2 mg/dL Final    Globulin 10/10/2023 2.7  gm/dL Final    A/G Ratio 10/10/2023 1.6  g/dL Final    BUN/Creatinine Ratio 10/10/2023 14.5  7.0 - 25.0 Final    Anion Gap 10/10/2023 7.7  5.0 - 15.0 mmol/L Final    eGFR 10/10/2023 110.0  >60.0 mL/min/1.73 Final    Color, UA 10/10/2023 Red (A)  Yellow, Straw Final    Dipstick results may be inaccurate due to color interference.         Appearance, UA 10/10/2023 Clear  Clear Final    pH, UA 10/10/2023 7.5  5.0 - 8.0 Final    Specific Gravity, UA 10/10/2023 1.006  1.005 - 1.030 Final    Glucose, UA 10/10/2023 Negative  Negative Final    Ketones, UA 10/10/2023 Negative  Negative Final    Bilirubin, UA 10/10/2023 Negative  Negative Final    Blood, UA 10/10/2023 Large (3+) (A)  Negative Final    Protein, UA 10/10/2023 100 mg/dL (2+) (A)  Negative Final    Leuk Esterase, UA 10/10/2023 Trace (A)  Negative Final    Nitrite, UA 10/10/2023 Negative  Negative Final    Urobilinogen, UA 10/10/2023 0.2 E.U./dL  0.2 - 1.0 E.U./dL Final    Valproic Acid 10/10/2023 <2.8 (L)  50.0 - 125.0 mcg/mL Final    WBC 10/10/2023 5.46  3.40 - 10.80 10*3/mm3 Final    RBC 10/10/2023 5.11  3.77 - 5.28 10*6/mm3 Final    Hemoglobin 10/10/2023 15.8  12.0 - 15.9 g/dL Final    Hematocrit 10/10/2023 49.0 (H)  34.0 - 46.6 % Final    MCV 10/10/2023 95.9  79.0 - 97.0 fL Final    MCH 10/10/2023 30.9  26.6 - 33.0 pg Final    MCHC 10/10/2023 32.2  31.5 - 35.7 g/dL Final    RDW 10/10/2023 13.8  12.3 - 15.4 % Final    RDW-SD 10/10/2023 49.1  37.0 - 54.0 fl Final    MPV 10/10/2023 10.0  6.0 - 12.0 fL Final    Platelets 10/10/2023 147  140 - 450 10*3/mm3 Final    Neutrophil % 10/10/2023 51.4  42.7 - 76.0 % Final    Lymphocyte % 10/10/2023 39.0  19.6 - 45.3 % Final    Monocyte % 10/10/2023 6.8  5.0 - 12.0 % Final    Eosinophil % 10/10/2023 2.4  0.3 - 6.2 % Final    Basophil % 10/10/2023 0.2  0.0 - 1.5 % Final    Immature Grans % 10/10/2023 0.2  0.0 - 0.5 % Final    Neutrophils, Absolute 10/10/2023 2.81  1.70 - 7.00 10*3/mm3 Final    Lymphocytes, Absolute 10/10/2023 2.13  0.70 - 3.10 10*3/mm3 Final    Monocytes, Absolute 10/10/2023 0.37  0.10 - 0.90 10*3/mm3 Final    Eosinophils, Absolute 10/10/2023 0.13  0.00 - 0.40 10*3/mm3 Final    Basophils, Absolute 10/10/2023 0.01  0.00 - 0.20 10*3/mm3 Final    Immature Grans, Absolute 10/10/2023 0.01  0.00 - 0.05 10*3/mm3 Final    nRBC 10/10/2023 0.0  0.0  - 0.2 /100 WBC Final    RBC, UA 10/10/2023 Too Numerous to Count (A)  None Seen, 0-2 /HPF Final    WBC, UA 10/10/2023 3-5 (A)  None Seen, 0-2 /HPF Final    Urine culture not indicated.    Bacteria, UA 10/10/2023 None Seen  None Seen /HPF Final    Squamous Epithelial Cells, UA 10/10/2023 0-2  None Seen, 0-2 /HPF Final    Hyaline Casts, UA 10/10/2023 None Seen  None Seen /LPF Final    Methodology 10/10/2023 Automated Microscopy   Final   Admission on 09/17/2023, Discharged on 09/17/2023   Component Date Value Ref Range Status    THC, Screen, Urine 09/17/2023 Positive (A)  Negative Final    Phencyclidine (PCP), Urine 09/17/2023 Negative  Negative Final    Cocaine Screen, Urine 09/17/2023 Negative  Negative Final    Methamphetamine, Ur 09/17/2023 Negative  Negative Final    Opiate Screen 09/17/2023 Negative  Negative Final    Amphetamine Screen, Urine 09/17/2023 Negative  Negative Final    Benzodiazepine Screen, Urine 09/17/2023 Negative  Negative Final    Tricyclic Antidepressants Screen 09/17/2023 Negative  Negative Final    Methadone Screen, Urine 09/17/2023 Negative  Negative Final    Barbiturates Screen, Urine 09/17/2023 Negative  Negative Final    Oxycodone Screen, Urine 09/17/2023 Negative  Negative Final    Propoxyphene Screen 09/17/2023 Negative  Negative Final    Buprenorphine, Screen, Urine 09/17/2023 Negative  Negative Final    Color, UA 09/17/2023 Fe (A)  Yellow, Straw Final    Appearance, UA 09/17/2023 Cloudy (A)  Clear Final    pH, UA 09/17/2023 7.5  5.0 - 8.0 Final    Specific Gravity, UA 09/17/2023 1.008  1.005 - 1.030 Final    Glucose, UA 09/17/2023 Negative  Negative Final    Ketones, UA 09/17/2023 Negative  Negative Final    Bilirubin, UA 09/17/2023 Negative  Negative Final    Blood, UA 09/17/2023 Large (3+) (A)  Negative Final    Protein, UA 09/17/2023 Negative  Negative Final    Leuk Esterase, UA 09/17/2023 Trace (A)  Negative Final    Nitrite, UA 09/17/2023 Negative  Negative Final     Urobilinogen, UA 09/17/2023 0.2 E.U./dL  0.2 - 1.0 E.U./dL Final    Glucose 09/17/2023 92  65 - 99 mg/dL Final    BUN 09/17/2023 5 (L)  6 - 20 mg/dL Final    Creatinine 09/17/2023 0.58  0.57 - 1.00 mg/dL Final    Sodium 09/17/2023 139  136 - 145 mmol/L Final    Potassium 09/17/2023 3.9  3.5 - 5.2 mmol/L Final    Chloride 09/17/2023 110 (H)  98 - 107 mmol/L Final    CO2 09/17/2023 22.2  22.0 - 29.0 mmol/L Final    Calcium 09/17/2023 8.4 (L)  8.6 - 10.5 mg/dL Final    Total Protein 09/17/2023 6.0  6.0 - 8.5 g/dL Final    Albumin 09/17/2023 3.7  3.5 - 5.2 g/dL Final    ALT (SGPT) 09/17/2023 17  1 - 33 U/L Final    AST (SGOT) 09/17/2023 20  1 - 32 U/L Final    Alkaline Phosphatase 09/17/2023 53  39 - 117 U/L Final    Total Bilirubin 09/17/2023 0.2  0.0 - 1.2 mg/dL Final    Globulin 09/17/2023 2.3  gm/dL Final    A/G Ratio 09/17/2023 1.6  g/dL Final    BUN/Creatinine Ratio 09/17/2023 8.6  7.0 - 25.0 Final    Anion Gap 09/17/2023 6.8  5.0 - 15.0 mmol/L Final    eGFR 09/17/2023 111.8  >60.0 mL/min/1.73 Final    WBC 09/17/2023 4.81  3.40 - 10.80 10*3/mm3 Final    RBC 09/17/2023 4.45  3.77 - 5.28 10*6/mm3 Final    Hemoglobin 09/17/2023 13.7  12.0 - 15.9 g/dL Final    Hematocrit 09/17/2023 41.7  34.0 - 46.6 % Final    MCV 09/17/2023 93.7  79.0 - 97.0 fL Final    MCH 09/17/2023 30.8  26.6 - 33.0 pg Final    MCHC 09/17/2023 32.9  31.5 - 35.7 g/dL Final    RDW 09/17/2023 13.0  12.3 - 15.4 % Final    RDW-SD 09/17/2023 44.2  37.0 - 54.0 fl Final    MPV 09/17/2023 10.3  6.0 - 12.0 fL Final    Platelets 09/17/2023 131 (L)  140 - 450 10*3/mm3 Final    Neutrophil % 09/17/2023 63.3  42.7 - 76.0 % Final    Lymphocyte % 09/17/2023 32.0  19.6 - 45.3 % Final    Monocyte % 09/17/2023 3.3 (L)  5.0 - 12.0 % Final    Eosinophil % 09/17/2023 1.0  0.3 - 6.2 % Final    Basophil % 09/17/2023 0.2  0.0 - 1.5 % Final    Immature Grans % 09/17/2023 0.2  0.0 - 0.5 % Final    Neutrophils, Absolute 09/17/2023 3.04  1.70 - 7.00 10*3/mm3 Final     Lymphocytes, Absolute 09/17/2023 1.54  0.70 - 3.10 10*3/mm3 Final    Monocytes, Absolute 09/17/2023 0.16  0.10 - 0.90 10*3/mm3 Final    Eosinophils, Absolute 09/17/2023 0.05  0.00 - 0.40 10*3/mm3 Final    Basophils, Absolute 09/17/2023 0.01  0.00 - 0.20 10*3/mm3 Final    Immature Grans, Absolute 09/17/2023 0.01  0.00 - 0.05 10*3/mm3 Final    nRBC 09/17/2023 0.0  0.0 - 0.2 /100 WBC Final    Magnesium 09/17/2023 1.8  1.6 - 2.6 mg/dL Final    Fentanyl, Urine 09/17/2023 Negative  Negative Final    RBC, UA 09/17/2023 Too Numerous to Count (A)  None Seen, 0-2 /HPF Final    WBC, UA 09/17/2023 0-2  None Seen, 0-2 /HPF Final    Bacteria, UA 09/17/2023 None Seen  None Seen /HPF Final    Squamous Epithelial Cells, UA 09/17/2023 0-2  None Seen, 0-2 /HPF Final    Hyaline Casts, UA 09/17/2023 None Seen  None Seen /LPF Final    Methodology 09/17/2023 Automated Microscopy   Final   Admission on 08/26/2023, Discharged on 08/26/2023   Component Date Value Ref Range Status    Glucose 08/26/2023 74  65 - 99 mg/dL Final    BUN 08/26/2023 7  6 - 20 mg/dL Final    Creatinine 08/26/2023 0.50 (L)  0.57 - 1.00 mg/dL Final    Sodium 08/26/2023 140  136 - 145 mmol/L Final    Potassium 08/26/2023 3.9  3.5 - 5.2 mmol/L Final    Chloride 08/26/2023 106  98 - 107 mmol/L Final    CO2 08/26/2023 23.6  22.0 - 29.0 mmol/L Final    Calcium 08/26/2023 9.0  8.6 - 10.5 mg/dL Final    Total Protein 08/26/2023 6.5  6.0 - 8.5 g/dL Final    Albumin 08/26/2023 4.1  3.5 - 5.2 g/dL Final    ALT (SGPT) 08/26/2023 36 (H)  1 - 33 U/L Final    AST (SGOT) 08/26/2023 29  1 - 32 U/L Final    Alkaline Phosphatase 08/26/2023 62  39 - 117 U/L Final    Total Bilirubin 08/26/2023 0.6  0.0 - 1.2 mg/dL Final    Globulin 08/26/2023 2.4  gm/dL Final    A/G Ratio 08/26/2023 1.7  g/dL Final    BUN/Creatinine Ratio 08/26/2023 14.0  7.0 - 25.0 Final    Anion Gap 08/26/2023 10.4  5.0 - 15.0 mmol/L Final    eGFR 08/26/2023 115.9  >60.0 mL/min/1.73 Final    Protime 08/26/2023 13.4   12.1 - 14.7 Seconds Final    INR 08/26/2023 0.97  0.90 - 1.10 Final    C-Reactive Protein 08/26/2023 <0.30  0.00 - 0.50 mg/dL Final    QT Interval 08/26/2023 366  ms Final    QTC Interval 08/26/2023 462  ms Final    Extra Tube 08/26/2023 Hold for add-ons.   Final    Auto resulted.    Extra Tube 08/26/2023 hold for add-on   Final    Auto resulted    Extra Tube 08/26/2023 Hold for add-ons.   Final    Auto resulted.    Extra Tube 08/26/2023 Hold for add-ons.   Final    Auto resulted    WBC 08/26/2023 5.32  3.40 - 10.80 10*3/mm3 Final    RBC 08/26/2023 5.04  3.77 - 5.28 10*6/mm3 Final    Hemoglobin 08/26/2023 15.3  12.0 - 15.9 g/dL Final    Hematocrit 08/26/2023 46.2  34.0 - 46.6 % Final    MCV 08/26/2023 91.7  79.0 - 97.0 fL Final    MCH 08/26/2023 30.4  26.6 - 33.0 pg Final    MCHC 08/26/2023 33.1  31.5 - 35.7 g/dL Final    RDW 08/26/2023 13.0  12.3 - 15.4 % Final    RDW-SD 08/26/2023 43.7  37.0 - 54.0 fl Final    MPV 08/26/2023 10.3  6.0 - 12.0 fL Final    Platelets 08/26/2023 150  140 - 450 10*3/mm3 Final    Neutrophil % 08/26/2023 44.9  42.7 - 76.0 % Final    Lymphocyte % 08/26/2023 44.0  19.6 - 45.3 % Final    Monocyte % 08/26/2023 7.9  5.0 - 12.0 % Final    Eosinophil % 08/26/2023 2.8  0.3 - 6.2 % Final    Basophil % 08/26/2023 0.2  0.0 - 1.5 % Final    Immature Grans % 08/26/2023 0.2  0.0 - 0.5 % Final    Neutrophils, Absolute 08/26/2023 2.39  1.70 - 7.00 10*3/mm3 Final    Lymphocytes, Absolute 08/26/2023 2.34  0.70 - 3.10 10*3/mm3 Final    Monocytes, Absolute 08/26/2023 0.42  0.10 - 0.90 10*3/mm3 Final    Eosinophils, Absolute 08/26/2023 0.15  0.00 - 0.40 10*3/mm3 Final    Basophils, Absolute 08/26/2023 0.01  0.00 - 0.20 10*3/mm3 Final    Immature Grans, Absolute 08/26/2023 0.01  0.00 - 0.05 10*3/mm3 Final    nRBC 08/26/2023 0.0  0.0 - 0.2 /100 WBC Final        Procedure:   Urethral dilation-after an appropriate informed consent, the patient was brought to the procedure suite.  The urethra was gently  anesthetized with 10 mL of 2% viscous Xylocaine jelly.  After an adequate period of topical anesthesia I went ahead and  dilated with [place] sounds from 16 to 26 [place] sequentially without complication. The patient was given gentamicin as prophylaxis with 80 mg.    Assessment/Plan:   Posttraumatic urethral stricture  Narcotic pain medication-patient has significant acute pain that I believe would be an indication for the use of narcotic pain medication.  I discussed the significant risks of pain medication and the fact that this will be a short only option and I will give her no more than a three-day supply of pain medication, I will not plan long-term medication, and that this will be sent to a pain clinic if it at all becomes necessary.  We discussed signing a pain medication agreement and the fact that we're going to run a state [facility] review to be sure the patient is not getting pain medication from elsewhere.  If this is the case, we will not give pain medication as part of the patient's treatment plan of there being prescribed a controlled substance with potential for abuse.  This patient has been well aware of the appropriate dose of such medications including the risks for somnolence, limited ability to drive and/or safety and the significant potential for overdose.  It has been made clear that these medications are for the prescribed patient only without concomitant use of alcohol or other substance unless prescribed by the medical provider.  Has completed prescribing agreement detailing the terms of continue prescribing him a controlled substance including monitoring Luis Alberto reports, the possibility of urine drug screens, and pill counts.  The patient is aware that we review LUIS ALBERTO reports on a regular basis and scan them into the chart.  History and physical examination exhibited continued safe and appropriate use of controlled substances. We also discussed the fact that the new [place] legislation  allows only a three-day prescription for pain medication.  In this situation he will be referred to a chronic pain clinic.  Nausea and vomiting-patient has significant nausea and vomiting as a consequence of this.  We recommended Phenergan.  We also discussed the use of Zofran and the sedating side effects of Phenergan as well.          This document has been electronically signed by VERENICE FINK MD November 14, 2023 09:00 EST    Dictated Utilizing Dragon Dictation: Part of this note may be an electronic transcription/translation of spoken language to printed text using the Dragon Dictation System.

## 2023-11-30 ENCOUNTER — HOSPITAL ENCOUNTER (EMERGENCY)
Facility: HOSPITAL | Age: 48
Discharge: HOME | End: 2023-11-30
Attending: EMERGENCY MEDICINE
Payer: COMMERCIAL

## 2023-11-30 VITALS
WEIGHT: 102 LBS | DIASTOLIC BLOOD PRESSURE: 79 MMHG | BODY MASS INDEX: 15.46 KG/M2 | HEART RATE: 91 BPM | RESPIRATION RATE: 18 BRPM | TEMPERATURE: 98.4 F | HEIGHT: 68 IN | OXYGEN SATURATION: 99 % | SYSTOLIC BLOOD PRESSURE: 125 MMHG

## 2023-11-30 DIAGNOSIS — G43.909 MIGRAINE WITHOUT STATUS MIGRAINOSUS, NOT INTRACTABLE, UNSPECIFIED MIGRAINE TYPE: Primary | ICD-10-CM

## 2023-11-30 LAB
FLUAV RNA RESP QL NAA+PROBE: NOT DETECTED
FLUBV RNA RESP QL NAA+PROBE: NOT DETECTED
SARS-COV-2 RNA RESP QL NAA+PROBE: NOT DETECTED

## 2023-11-30 PROCEDURE — 25010000002 DIPHENHYDRAMINE PER 50 MG: Performed by: EMERGENCY MEDICINE

## 2023-11-30 PROCEDURE — 25810000003 SODIUM CHLORIDE 0.9 % SOLUTION: Performed by: PHYSICIAN ASSISTANT

## 2023-11-30 PROCEDURE — 25010000002 MORPHINE PER 10 MG: Performed by: EMERGENCY MEDICINE

## 2023-11-30 PROCEDURE — 96375 TX/PRO/DX INJ NEW DRUG ADDON: CPT

## 2023-11-30 PROCEDURE — 25010000002 DEXAMETHASONE PER 1 MG: Performed by: EMERGENCY MEDICINE

## 2023-11-30 PROCEDURE — 96374 THER/PROPH/DIAG INJ IV PUSH: CPT

## 2023-11-30 PROCEDURE — 96376 TX/PRO/DX INJ SAME DRUG ADON: CPT

## 2023-11-30 PROCEDURE — 25010000002 ONDANSETRON PER 1 MG: Performed by: EMERGENCY MEDICINE

## 2023-11-30 PROCEDURE — 99283 EMERGENCY DEPT VISIT LOW MDM: CPT

## 2023-11-30 PROCEDURE — 87636 SARSCOV2 & INF A&B AMP PRB: CPT | Performed by: PHYSICIAN ASSISTANT

## 2023-11-30 RX ORDER — MORPHINE SULFATE 2 MG/ML
2 INJECTION, SOLUTION INTRAMUSCULAR; INTRAVENOUS ONCE
Status: COMPLETED | OUTPATIENT
Start: 2023-11-30 | End: 2023-11-30

## 2023-11-30 RX ORDER — ONDANSETRON 2 MG/ML
4 INJECTION INTRAMUSCULAR; INTRAVENOUS ONCE
Status: COMPLETED | OUTPATIENT
Start: 2023-11-30 | End: 2023-11-30

## 2023-11-30 RX ORDER — DIPHENHYDRAMINE HYDROCHLORIDE 50 MG/ML
25 INJECTION INTRAMUSCULAR; INTRAVENOUS ONCE
Status: COMPLETED | OUTPATIENT
Start: 2023-11-30 | End: 2023-11-30

## 2023-11-30 RX ORDER — SODIUM CHLORIDE 0.9 % (FLUSH) 0.9 %
10 SYRINGE (ML) INJECTION AS NEEDED
Status: DISCONTINUED | OUTPATIENT
Start: 2023-11-30 | End: 2023-11-30 | Stop reason: HOSPADM

## 2023-11-30 RX ORDER — DEXAMETHASONE SODIUM PHOSPHATE 4 MG/ML
10 INJECTION, SOLUTION INTRA-ARTICULAR; INTRALESIONAL; INTRAMUSCULAR; INTRAVENOUS; SOFT TISSUE ONCE
Status: COMPLETED | OUTPATIENT
Start: 2023-11-30 | End: 2023-11-30

## 2023-11-30 RX ADMIN — SODIUM CHLORIDE 1000 ML: 9 INJECTION, SOLUTION INTRAVENOUS at 14:10

## 2023-11-30 RX ADMIN — ONDANSETRON 4 MG: 2 INJECTION INTRAMUSCULAR; INTRAVENOUS at 14:10

## 2023-11-30 RX ADMIN — DEXAMETHASONE SODIUM PHOSPHATE 10 MG: 4 INJECTION, SOLUTION INTRA-ARTICULAR; INTRALESIONAL; INTRAMUSCULAR; INTRAVENOUS; SOFT TISSUE at 14:10

## 2023-11-30 RX ADMIN — MORPHINE SULFATE 2 MG: 2 INJECTION, SOLUTION INTRAMUSCULAR; INTRAVENOUS at 14:10

## 2023-11-30 RX ADMIN — DIPHENHYDRAMINE HYDROCHLORIDE 25 MG: 50 INJECTION, SOLUTION INTRAMUSCULAR; INTRAVENOUS at 14:11

## 2023-11-30 RX ADMIN — MORPHINE SULFATE 2 MG: 2 INJECTION, SOLUTION INTRAMUSCULAR; INTRAVENOUS at 15:19

## 2023-11-30 NOTE — ED PROVIDER NOTES
Subjective   History of Present Illness  48-year-old female with past medical history of PTSD, migraines, meningioma, IBS, fibromyalgia, degenerative disc disease, bipolar disorder, and Buerger's disease presents to the emergency room with a migraine for the past 5 days.  Patient states this migraine is consistent with migraines that she has experienced in the past and sometimes has to have assistance to abort the headache.  She states that she has been unable to eat secondary to the nausea that the headache is causing.  She denies any visual changes, fevers, neck pain/stiffness, or recent illness.  Aggravating factors include light and loud noise.  Denies any alleviating factors despite her home medications.  Denies any other complaints or concerns at this time.    History provided by:  Patient   used: No        Review of Systems   Constitutional: Negative.  Positive for appetite change. Negative for fever.   HENT: Negative.     Respiratory: Negative.     Cardiovascular: Negative.  Negative for chest pain.   Gastrointestinal: Negative.  Negative for abdominal pain.   Endocrine: Negative.    Genitourinary: Negative.  Negative for dysuria.   Skin: Negative.    Neurological: Negative.  Positive for headaches.   Psychiatric/Behavioral: Negative.     All other systems reviewed and are negative.      Past Medical History:   Diagnosis Date    Hoyos's disease     Bipolar 1 disorder     Constipation     DDD (degenerative disc disease), cervical 05/29/2017    Fibromyalgia     IBS (irritable bowel syndrome)     Meningioma     Migraine     PONV (postoperative nausea and vomiting)     PTSD (post-traumatic stress disorder)     Rectal bleeding        Allergies   Allergen Reactions    Ativan [Lorazepam] Hallucinations     confusion    Sulfa Antibiotics Shortness Of Breath and Swelling    Sulfa Antibiotics Anaphylaxis    Reglan [Metoclopramide] Angioedema    Compazine [Prochlorperazine Edisylate] Hives     Demerol [Meperidine] Hives    Droperidol Itching    Metoclopramide Swelling    Toradol [Ketorolac Tromethamine] Hives and Itching    Toradol [Ketorolac Tromethamine] Hives    Zosyn [Piperacillin Sod-Tazobactam So] Hives       Past Surgical History:   Procedure Laterality Date    ANAL SCOPE N/A 2016    Procedure: ANAL SCOPE;  Surgeon: Kael Lopez MD;  Location: [facility];  Service:     APPENDECTOMY      COLONOSCOPY N/A 2016    Procedure: COLONOSCOPY  CPTCODE:23719;  Surgeon: Jose Antonio Belle III, MD;  Location: [facility];  Service:     COLONOSCOPY N/A 2016    Procedure: COLONOSCOPY (22422) CPT;  Surgeon: Jose Antonio Belle III, MD;  Location: [facility];  Service:     CYSTOSCOPY RETROGRADE PYELOGRAM Bilateral 2017    Procedure: CYSTOSCOPY RETROGRADE PYELOGRAM;  Surgeon: Mu Blunt MD;  Location: [facility];  Service:     ENDOSCOPY N/A 2016    Procedure: ESOPHAGOGASTRODUODENOSCOPY WITH BIOPSY  CPTCODE:76953;  Surgeon: Jose Antonio Belle III, MD;  Location: [facility];  Service:     HEMORRHOIDECTOMY N/A 2016    Procedure: HEMORRHOID STAPLING;  Surgeon: Kael Lopez MD;  Location: [facility];  Service:     HYSTERECTOMY      KNEE SURGERY      LAPAROSCOPIC SALPINGOOPHERECTOMY      PORTACATH PLACEMENT N/A 2017    Procedure: INSERTION OF PORTACATH;  Surgeon: Celso Arredondo MD;  Location: [facility];  Service:     SHOULDER SURGERY      3 times       Family History   Problem Relation Age of Onset    Crohn's disease Other     Hypertension Other     Diabetes Other     Irritable bowel syndrome Other     No Known Problems Father     No Known Problems Mother        Social History     Socioeconomic History    Marital status:    Tobacco Use    Smoking status: Former     Packs/day: 1.00     Years: 20.00     Additional pack years: 0.00     Total pack years: 20.00     Types: Cigarettes     Quit date: 2015     Years since quittin.0     Passive exposure: Past     Smokeless tobacco: Never   Vaping Use    Vaping Use: Never used   Substance and Sexual Activity    Alcohol use: No    Drug use: Yes     Types: Marijuana     Comment: for pain    Sexual activity: Defer     Birth control/protection: Surgical           Objective   Physical Exam  Vitals and nursing note reviewed.   Constitutional:       General: She is not in acute distress.     Appearance: She is well-developed. She is not diaphoretic.   HENT:      Head: Normocephalic and atraumatic.      Right Ear: External ear normal.      Left Ear: External ear normal.      Nose: Nose normal.   Eyes:      Conjunctiva/sclera: Conjunctivae normal.      Pupils: Pupils are equal, round, and reactive to light.   Neck:      Vascular: No JVD.      Trachea: No tracheal deviation.   Cardiovascular:      Rate and Rhythm: Normal rate and regular rhythm.      Heart sounds: Normal heart sounds. No murmur heard.  Pulmonary:      Effort: Pulmonary effort is normal. No respiratory distress.      Breath sounds: Normal breath sounds. No wheezing.   Abdominal:      General: Bowel sounds are normal.      Palpations: Abdomen is soft.      Tenderness: There is no abdominal tenderness.   Musculoskeletal:         General: No deformity. Normal range of motion.      Cervical back: Normal range of motion and neck supple.   Skin:     General: Skin is warm and dry.      Coloration: Skin is not pale.      Findings: No erythema or rash.   Neurological:      Mental Status: She is alert and oriented to person, place, and time.      Cranial Nerves: No cranial nerve deficit.   Psychiatric:         Behavior: Behavior normal.         Thought Content: Thought content normal.         Procedures           ED Course                                             Medical Decision Making  48-year-old female with past medical history of PTSD, migraines, meningioma, IBS, fibromyalgia, degenerative disc disease, bipolar disorder, and Buerger's disease presents to the emergency  room with a migraine for the past 5 days.  Patient states this migraine is consistent with migraines that she has experienced in the past and sometimes has to have assistance to abort the headache.  She states that she has been unable to eat secondary to the nausea that the headache is causing.  She denies any visual changes, fevers, neck pain/stiffness, or recent illness.  Aggravating factors include light and loud noise.  Denies any alleviating factors despite her home medications.  Denies any other complaints or concerns at this time.      Problems Addressed:  Migraine without status migrainosus, not intractable, unspecified migraine type: complicated acute illness or injury    Risk  Prescription drug management.        Final diagnoses:   Migraine without status migrainosus, not intractable, unspecified migraine type       ED Disposition  ED Disposition       ED Disposition   Discharge    Condition   Stable    Comment   --               Mabel Patterson, APRN  [address] [place] [address]  [phone]    In 2 days           Medication List      No changes were made to your prescriptions during this visit.            Han Varghese PA-C  11/30/23 1603

## 2023-12-14 ENCOUNTER — OFFICE VISIT (OUTPATIENT)
Dept: UROLOGY | Facility: CLINIC | Age: 48
End: 2023-12-14
Payer: COMMERCIAL

## 2023-12-14 VITALS
SYSTOLIC BLOOD PRESSURE: 137 MMHG | DIASTOLIC BLOOD PRESSURE: 78 MMHG | HEIGHT: 68 IN | HEART RATE: 83 BPM | BODY MASS INDEX: 15.55 KG/M2 | WEIGHT: 102.6 LBS

## 2023-12-14 DIAGNOSIS — F31.9 BIPOLAR 1 DISORDER: ICD-10-CM

## 2023-12-14 DIAGNOSIS — N23 RENAL COLIC: ICD-10-CM

## 2023-12-14 DIAGNOSIS — N35.028 OTHER POST-TRAUMATIC URETHRAL STRICTURE, FEMALE: Primary | ICD-10-CM

## 2023-12-14 RX ORDER — PROMETHAZINE HYDROCHLORIDE 25 MG/1
25 TABLET ORAL EVERY 6 HOURS PRN
Qty: 28 TABLET | Refills: 4 | Status: SHIPPED | OUTPATIENT
Start: 2023-12-14

## 2023-12-14 RX ORDER — GENTAMICIN SULFATE 40 MG/ML
80 INJECTION, SOLUTION INTRAMUSCULAR; INTRAVENOUS ONCE
Status: COMPLETED | OUTPATIENT
Start: 2023-12-14 | End: 2023-12-14

## 2023-12-14 RX ORDER — MEGESTROL ACETATE 20 MG/1
20 TABLET ORAL DAILY
Qty: 30 TABLET | Refills: 3 | Status: SHIPPED | OUTPATIENT
Start: 2023-12-14

## 2023-12-14 RX ORDER — OXYCODONE AND ACETAMINOPHEN 10; 325 MG/1; MG/1
1 TABLET ORAL EVERY 6 HOURS PRN
Qty: 20 TABLET | Refills: 0 | Status: SHIPPED | OUTPATIENT
Start: 2023-12-14

## 2023-12-14 RX ADMIN — GENTAMICIN SULFATE 80 MG: 40 INJECTION, SOLUTION INTRAMUSCULAR; INTRAVENOUS at 10:24

## 2023-12-14 NOTE — PROGRESS NOTES
Chief Complaint:      Chief Complaint   Patient presents with    Dilation     Follow up        HPI:   48 y.o. female here for urethral dilation.  She has extreme difficulty urinating.  Past Medical History:     Past Medical History:   Diagnosis Date    Hoyos's disease     Bipolar 1 disorder     Constipation     DDD (degenerative disc disease), cervical 05/29/2017    Fibromyalgia     IBS (irritable bowel syndrome)     Meningioma     Migraine     PONV (postoperative nausea and vomiting)     PTSD (post-traumatic stress disorder)     Rectal bleeding        Current Meds:     Current Outpatient Medications   Medication Sig Dispense Refill    albuterol (PROVENTIL HFA;VENTOLIN HFA) 108 (90 Base) MCG/ACT inhaler Inhale 2 puffs 2 (Two) Times a Day.      Azelastine HCl 137 MCG/SPRAY solution 1 spray into the nostril(s) as directed by provider As Needed (Allergies).      busPIRone (BUSPAR) 15 MG tablet Take 1 tablet by mouth 3 (Three) Times a Day.      diclofenac (VOLTAREN) 1 % gel gel       divalproex (DEPAKOTE) 500 MG DR tablet Take 1 tablet by mouth 3 (Three) Times a Day. 90 tablet 2    docusate sodium (COLACE) 100 MG capsule Take 1 capsule by mouth 2 (Two) Times a Day. 20 capsule 0    docusate sodium (COLACE) 100 MG capsule Take 1 capsule by mouth 2 (Two) Times a Day As Needed for Constipation. 60 capsule 0    fluticasone (VERAMYST) 27.5 MCG/SPRAY nasal spray 2 sprays into the nostril(s) as directed by provider Daily.      megestrol (MEGACE) 20 MG tablet Take 1 tablet by mouth Daily. 30 tablet 3    methylPREDNISolone (MEDROL) 4 MG dose pack Take as directed on package instructions. 21 tablet 0    metroNIDAZOLE (FLAGYL) 500 MG tablet Take 1 tablet by mouth 3 (Three) Times a Day. 30 tablet 0    montelukast (SINGULAIR) 10 MG tablet Take 1 tablet by mouth Every Night.      nitrofurantoin, macrocrystal-monohydrate, (MACROBID) 100 MG capsule Take 1 capsule by mouth 2 (Two) Times a Day. 13 capsule 0    ondansetron ODT  (ZOFRAN-ODT) 4 MG disintegrating tablet Place 2 tablets on the tongue Every 8 (Eight) Hours As Needed for Nausea or Vomiting. 30 tablet 0    oxyCODONE-acetaminophen (Percocet)  MG per tablet Take 1 tablet by mouth Every 6 (Six) Hours As Needed for Moderate Pain. 20 tablet 0    oxyCODONE-acetaminophen (Percocet)  MG per tablet Take 1 tablet by mouth Every 6 (Six) Hours As Needed for Moderate Pain. 16 tablet 0    oxyCODONE-acetaminophen (PERCOCET) 5-325 MG per tablet Take 1 tablet by mouth Every 8 (Eight) Hours As Needed for Moderate Pain. 12 tablet 0    oxyCODONE-acetaminophen (PERCOCET) 5-325 MG per tablet Take 1 tablet by mouth Every 6 (Six) Hours As Needed for Moderate Pain. 2 tablet 0    pantoprazole (PROTONIX) 40 MG EC tablet Take 1 tablet by mouth 2 (Two) Times a Day As Needed.      phenazopyridine (PYRIDIUM) 100 MG tablet Take 1 tablet by mouth 3 (Three) Times a Day As Needed for Bladder Spasms. 30 tablet 1    polyethylene glycol (MIRALAX) 17 GM/SCOOP powder Take 17 g by mouth Daily. 225 g 0    promethazine (PHENERGAN) 25 MG tablet Take 1 tablet by mouth Every 6 (Six) Hours As Needed for Nausea or Vomiting. 28 tablet 4    promethazine (PHENERGAN) 25 MG tablet Take 1 tablet by mouth Every 6 (Six) Hours As Needed for Nausea or Vomiting. 21 tablet 5    sertraline (ZOLOFT) 100 MG tablet Take 1 tablet by mouth 2 (Two) Times a Day.      SUMAtriptan (IMITREX) 6 MG/0.5ML injection Inject prescribed dose at onset of headache. May repeat dose one time in 1 hour(s) if headache not relieved.      terazosin (HYTRIN) 2 MG capsule Take 1 capsule by mouth Every Night for 14 days. 14 capsule 0    traMADol (ULTRAM) 50 MG tablet        No current facility-administered medications for this visit.        Allergies:      Allergies   Allergen Reactions    Ativan [Lorazepam] Hallucinations     confusion    Sulfa Antibiotics Shortness Of Breath and Swelling    Sulfa Antibiotics Anaphylaxis    Reglan [Metoclopramide]  Angioedema    Compazine [Prochlorperazine Edisylate] Hives    Demerol [Meperidine] Hives    Droperidol Itching    Metoclopramide Swelling    Toradol [Ketorolac Tromethamine] Hives and Itching    Toradol [Ketorolac Tromethamine] Hives    Zosyn [Piperacillin Sod-Tazobactam So] Hives        Past Surgical History:     Past Surgical History:   Procedure Laterality Date    ANAL SCOPE N/A 2016    Procedure: ANAL SCOPE;  Surgeon: Kael Lopez MD;  Location: [facility] OR;  Service:     APPENDECTOMY      COLONOSCOPY N/A 2016    Procedure: COLONOSCOPY  CPTCODE:76952;  Surgeon: Jose Antonio Belle III, MD;  Location: [facility] OR;  Service:     COLONOSCOPY N/A 2016    Procedure: COLONOSCOPY (34763) CPT;  Surgeon: Jose Antonio Belle III, MD;  Location: [facility] OR;  Service:     CYSTOSCOPY RETROGRADE PYELOGRAM Bilateral 2017    Procedure: CYSTOSCOPY RETROGRADE PYELOGRAM;  Surgeon: Mu Blunt MD;  Location: [facility] OR;  Service:     ENDOSCOPY N/A 2016    Procedure: ESOPHAGOGASTRODUODENOSCOPY WITH BIOPSY  CPTCODE:24401;  Surgeon: Jose Antonio Belle III, MD;  Location: [facility] OR;  Service:     HEMORRHOIDECTOMY N/A 2016    Procedure: HEMORRHOID STAPLING;  Surgeon: Kael Lopez MD;  Location: [facility] OR;  Service:     HYSTERECTOMY      KNEE SURGERY      LAPAROSCOPIC SALPINGOOPHERECTOMY      PORTACATH PLACEMENT N/A 2017    Procedure: INSERTION OF PORTACATH;  Surgeon: Celso Arredondo MD;  Location: [facility] OR;  Service:     SHOULDER SURGERY      3 times       Social History:     Social History     Socioeconomic History    Marital status:    Tobacco Use    Smoking status: Former     Packs/day: 1.00     Years: 20.00     Additional pack years: 0.00     Total pack years: 20.00     Types: Cigarettes     Quit date: 2015     Years since quittin.1     Passive exposure: Past    Smokeless tobacco: Never   Vaping Use    Vaping Use: Never used   Substance and Sexual Activity     Alcohol use: No    Drug use: Yes     Types: Marijuana     Comment: for pain    Sexual activity: Defer     Birth control/protection: Surgical       Family History:     Family History   Problem Relation Age of Onset    Crohn's disease Other     Hypertension Other     Diabetes Other     Irritable bowel syndrome Other     No Known Problems Father     No Known Problems Mother        Review of Systems:     Review of Systems   Constitutional: Negative.  Negative for activity change, appetite change, chills, diaphoresis, fatigue and unexpected weight change.   HENT:  Negative for congestion, dental problem, drooling, ear discharge, ear pain, facial swelling, hearing loss, mouth sores, nosebleeds, postnasal drip, rhinorrhea, sinus pressure, sneezing, sore throat, tinnitus, trouble swallowing and voice change.    Eyes: Negative.  Negative for photophobia, pain, discharge, redness, itching and visual disturbance.   Respiratory: Negative.  Negative for apnea, cough, choking, chest tightness, shortness of breath, wheezing and stridor.    Cardiovascular: Negative.  Negative for chest pain, palpitations and leg swelling.   Gastrointestinal: Negative.  Negative for abdominal distention, abdominal pain, anal bleeding, blood in stool, constipation, diarrhea, nausea, rectal pain and vomiting.   Endocrine: Negative.  Negative for cold intolerance, heat intolerance, polydipsia, polyphagia and polyuria.   Genitourinary:  Positive for difficulty urinating.   Musculoskeletal: Negative.  Negative for arthralgias, back pain, gait problem, joint swelling, myalgias, neck pain and neck stiffness.   Skin: Negative.  Negative for color change, pallor, rash and wound.   Allergic/Immunologic: Negative.  Negative for environmental allergies, food allergies and immunocompromised state.   Neurological: Negative.  Negative for dizziness, tremors, seizures, syncope, facial asymmetry, speech difficulty, weakness, light-headedness, numbness and headaches.    Hematological: Negative.  Negative for adenopathy. Does not bruise/bleed easily.   Psychiatric/Behavioral:  Negative for agitation, behavioral problems, confusion, decreased concentration, dysphoric mood, hallucinations, self-injury, sleep disturbance and suicidal ideas. The patient is not nervous/anxious and is not hyperactive.    All other systems reviewed and are negative.      Physical Exam:     Physical Exam  Constitutional:       Appearance: She is well-developed.   HENT:      Head: Normocephalic and atraumatic.      Right Ear: External ear normal.      Left Ear: External ear normal.   Eyes:      Conjunctiva/sclera: Conjunctivae normal.      Pupils: Pupils are equal, round, and reactive to light.   Cardiovascular:      Rate and Rhythm: Normal rate and regular rhythm.      Heart sounds: Normal heart sounds.   Pulmonary:      Effort: Pulmonary effort is normal.      Breath sounds: Normal breath sounds.   Abdominal:      General: Bowel sounds are normal. There is no distension.      Palpations: Abdomen is soft. There is no mass.      Tenderness: There is no abdominal tenderness. There is no guarding or rebound.   Genitourinary:     General: Normal vulva.      Vagina: No vaginal discharge.   Musculoskeletal:         General: Normal range of motion.   Skin:     General: Skin is warm and dry.   Neurological:      Mental Status: She is alert.      Deep Tendon Reflexes: Reflexes are normal and symmetric.   Psychiatric:         Behavior: Behavior normal.         Thought Content: Thought content normal.         Judgment: Judgment normal.         I have reviewed the following portions of the patient's history: Allergies, current medications, past family history, past medical history, past social history, past surgical history, problem list, and ROS and confirm it is accurate.    Recent Image (CT and/or KUB):      CT Abdomen and Pelvis: No results found for this or any previous visit.       CT Stone Protocol: Results  for orders placed during the hospital encounter of 10/10/23    CT Abdomen Pelvis Stone Protocol    Narrative  CT ABDOMEN PELVIS STONE PROTOCOL-: 10/10/2023 9:19 PM    REASON FOR EXAM: Flank pain, kidney stone suspected LT FLANK PAIN.    TECHNIQUE: Noncontrast 4 mm axial sections with sagittal and coronal  reformats.    COMPARISON: 3/16/2023.    FINDINGS: There is focal bronchiectasis within the right lower lobe  (image 4/59), now with probable mucous plugging.  Noting the noncontrast  technique, the liver is unremarkable.  No focal liver lesions are  identified.  Best demonstrated on narrow windows, there is the  suggestion of cholelithiasis dependently within the gallbladder.  There  is no biliary ductal dilatation.  The spleen is at the upper limits of  normal in size.  The pancreas, adrenal glands and kidneys are within  normal limits, noting a probable cyst within the lower pole of the right  kidney (image 3/37).  There is no nephrolithiasis.  There is no abnormal  dilatation of either renal collecting system or ureter.  There is no  ureterolithiasis.  The abdominal aorta is normal in caliber, noting mild  vascular calcification.  There is a surgical anastomosis at the  rectoanal junction.  No complication is evident.  There are no findings  of bowel obstruction.  Noting the lack of enteric contrast material,  there is no abnormal bowel wall thickening.  The appendix is not  identified, however there are no findings of appendicitis.  There is no  free intraperitoneal fluid.  The uterus is absent.  The ovaries are not  identified.  There are no abnormal adnexal masses.  There is no lymph  node enlargement.  The bladder is unremarkable.  There is an old  fracture of L2.    Impression  1.  No acute abnormality of the abdomen or pelvis.  There is no  nephrolithiasis or ureterolithiasis.  2.  Incidental findings, as above, similar in appearance to 3/16/2023.      This report was finalized on 10/10/2023 11:05 PM by  Steph Fabian MD.       KUB: Results for orders placed during the hospital encounter of 01/10/23    XR Abdomen KUB    Narrative  EXAM:  XR Abdomen, 1 View    EXAM DATE:  1/10/2023 12:05 PM    CLINICAL HISTORY:  flank pain    TECHNIQUE:  Frontal supine view of the abdomen/pelvis.    COMPARISON:  10/01/2022    FINDINGS:  Gastrointestinal tract:  Unremarkable.  No dilation.  Organs:  No stones identified along the expected course of ureters.  Bones/joints:  Unremarkable.  Soft tissues:  Surgical staples in the lower pelvis.  Vasculature:  Stable phleboliths lower pelvis.    Impression  1.  No acute findings radiographically evident.  2.  No radiographic evidence of renal or ureteral stones.    This report was finalized on 1/10/2023 12:47 PM by Dr. Ankit Naik MD.       Labs (past 3 months):      Admission on 11/30/2023, Discharged on 11/30/2023   Component Date Value Ref Range Status    COVID19 11/30/2023 Not Detected  Not Detected - Ref. Range Final    Influenza A PCR 11/30/2023 Not Detected  Not Detected Final    Influenza B PCR 11/30/2023 Not Detected  Not Detected Final   Admission on 10/10/2023, Discharged on 10/10/2023   Component Date Value Ref Range Status    Glucose 10/10/2023 87  65 - 99 mg/dL Final    BUN 10/10/2023 9  6 - 20 mg/dL Final    Creatinine 10/10/2023 0.62  0.57 - 1.00 mg/dL Final    Sodium 10/10/2023 139  136 - 145 mmol/L Final    Potassium 10/10/2023 4.2  3.5 - 5.2 mmol/L Final    Chloride 10/10/2023 104  98 - 107 mmol/L Final    CO2 10/10/2023 27.3  22.0 - 29.0 mmol/L Final    Calcium 10/10/2023 9.5  8.6 - 10.5 mg/dL Final    Total Protein 10/10/2023 7.1  6.0 - 8.5 g/dL Final    Albumin 10/10/2023 4.4  3.5 - 5.2 g/dL Final    ALT (SGPT) 10/10/2023 36 (H)  1 - 33 U/L Final    AST (SGOT) 10/10/2023 32  1 - 32 U/L Final    Alkaline Phosphatase 10/10/2023 67  39 - 117 U/L Final    Total Bilirubin 10/10/2023 0.4  0.0 - 1.2 mg/dL Final    Globulin 10/10/2023 2.7  gm/dL Final    A/G Ratio  10/10/2023 1.6  g/dL Final    BUN/Creatinine Ratio 10/10/2023 14.5  7.0 - 25.0 Final    Anion Gap 10/10/2023 7.7  5.0 - 15.0 mmol/L Final    eGFR 10/10/2023 110.0  >60.0 mL/min/1.73 Final    Color, UA 10/10/2023 Red (A)  Yellow, Straw Final    Dipstick results may be inaccurate due to color interference.        Appearance, UA 10/10/2023 Clear  Clear Final    pH, UA 10/10/2023 7.5  5.0 - 8.0 Final    Specific Gravity, UA 10/10/2023 1.006  1.005 - 1.030 Final    Glucose, UA 10/10/2023 Negative  Negative Final    Ketones, UA 10/10/2023 Negative  Negative Final    Bilirubin, UA 10/10/2023 Negative  Negative Final    Blood, UA 10/10/2023 Large (3+) (A)  Negative Final    Protein, UA 10/10/2023 100 mg/dL (2+) (A)  Negative Final    Leuk Esterase, UA 10/10/2023 Trace (A)  Negative Final    Nitrite, UA 10/10/2023 Negative  Negative Final    Urobilinogen, UA 10/10/2023 0.2 E.U./dL  0.2 - 1.0 E.U./dL Final    Valproic Acid 10/10/2023 <2.8 (L)  50.0 - 125.0 mcg/mL Final    WBC 10/10/2023 5.46  3.40 - 10.80 10*3/mm3 Final    RBC 10/10/2023 5.11  3.77 - 5.28 10*6/mm3 Final    Hemoglobin 10/10/2023 15.8  12.0 - 15.9 g/dL Final    Hematocrit 10/10/2023 49.0 (H)  34.0 - 46.6 % Final    MCV 10/10/2023 95.9  79.0 - 97.0 fL Final    MCH 10/10/2023 30.9  26.6 - 33.0 pg Final    MCHC 10/10/2023 32.2  31.5 - 35.7 g/dL Final    RDW 10/10/2023 13.8  12.3 - 15.4 % Final    RDW-SD 10/10/2023 49.1  37.0 - 54.0 fl Final    MPV 10/10/2023 10.0  6.0 - 12.0 fL Final    Platelets 10/10/2023 147  140 - 450 10*3/mm3 Final    Neutrophil % 10/10/2023 51.4  42.7 - 76.0 % Final    Lymphocyte % 10/10/2023 39.0  19.6 - 45.3 % Final    Monocyte % 10/10/2023 6.8  5.0 - 12.0 % Final    Eosinophil % 10/10/2023 2.4  0.3 - 6.2 % Final    Basophil % 10/10/2023 0.2  0.0 - 1.5 % Final    Immature Grans % 10/10/2023 0.2  0.0 - 0.5 % Final    Neutrophils, Absolute 10/10/2023 2.81  1.70 - 7.00 10*3/mm3 Final    Lymphocytes, Absolute 10/10/2023 2.13  0.70 - 3.10  10*3/mm3 Final    Monocytes, Absolute 10/10/2023 0.37  0.10 - 0.90 10*3/mm3 Final    Eosinophils, Absolute 10/10/2023 0.13  0.00 - 0.40 10*3/mm3 Final    Basophils, Absolute 10/10/2023 0.01  0.00 - 0.20 10*3/mm3 Final    Immature Grans, Absolute 10/10/2023 0.01  0.00 - 0.05 10*3/mm3 Final    nRBC 10/10/2023 0.0  0.0 - 0.2 /100 WBC Final    RBC, UA 10/10/2023 Too Numerous to Count (A)  None Seen, 0-2 /HPF Final    WBC, UA 10/10/2023 3-5 (A)  None Seen, 0-2 /HPF Final    Urine culture not indicated.    Bacteria, UA 10/10/2023 None Seen  None Seen /HPF Final    Squamous Epithelial Cells, UA 10/10/2023 0-2  None Seen, 0-2 /HPF Final    Hyaline Casts, UA 10/10/2023 None Seen  None Seen /LPF Final    Methodology 10/10/2023 Automated Microscopy   Final   Admission on 09/17/2023, Discharged on 09/17/2023   Component Date Value Ref Range Status    THC, Screen, Urine 09/17/2023 Positive (A)  Negative Final    Phencyclidine (PCP), Urine 09/17/2023 Negative  Negative Final    Cocaine Screen, Urine 09/17/2023 Negative  Negative Final    Methamphetamine, Ur 09/17/2023 Negative  Negative Final    Opiate Screen 09/17/2023 Negative  Negative Final    Amphetamine Screen, Urine 09/17/2023 Negative  Negative Final    Benzodiazepine Screen, Urine 09/17/2023 Negative  Negative Final    Tricyclic Antidepressants Screen 09/17/2023 Negative  Negative Final    Methadone Screen, Urine 09/17/2023 Negative  Negative Final    Barbiturates Screen, Urine 09/17/2023 Negative  Negative Final    Oxycodone Screen, Urine 09/17/2023 Negative  Negative Final    Propoxyphene Screen 09/17/2023 Negative  Negative Final    Buprenorphine, Screen, Urine 09/17/2023 Negative  Negative Final    Color, UA 09/17/2023 Fe (A)  Yellow, Straw Final    Appearance, UA 09/17/2023 Cloudy (A)  Clear Final    pH, UA 09/17/2023 7.5  5.0 - 8.0 Final    Specific Gravity, UA 09/17/2023 1.008  1.005 - 1.030 Final    Glucose, UA 09/17/2023 Negative  Negative Final    Ketones,  UA 09/17/2023 Negative  Negative Final    Bilirubin, UA 09/17/2023 Negative  Negative Final    Blood, UA 09/17/2023 Large (3+) (A)  Negative Final    Protein, UA 09/17/2023 Negative  Negative Final    Leuk Esterase, UA 09/17/2023 Trace (A)  Negative Final    Nitrite, UA 09/17/2023 Negative  Negative Final    Urobilinogen, UA 09/17/2023 0.2 E.U./dL  0.2 - 1.0 E.U./dL Final    Glucose 09/17/2023 92  65 - 99 mg/dL Final    BUN 09/17/2023 5 (L)  6 - 20 mg/dL Final    Creatinine 09/17/2023 0.58  0.57 - 1.00 mg/dL Final    Sodium 09/17/2023 139  136 - 145 mmol/L Final    Potassium 09/17/2023 3.9  3.5 - 5.2 mmol/L Final    Chloride 09/17/2023 110 (H)  98 - 107 mmol/L Final    CO2 09/17/2023 22.2  22.0 - 29.0 mmol/L Final    Calcium 09/17/2023 8.4 (L)  8.6 - 10.5 mg/dL Final    Total Protein 09/17/2023 6.0  6.0 - 8.5 g/dL Final    Albumin 09/17/2023 3.7  3.5 - 5.2 g/dL Final    ALT (SGPT) 09/17/2023 17  1 - 33 U/L Final    AST (SGOT) 09/17/2023 20  1 - 32 U/L Final    Alkaline Phosphatase 09/17/2023 53  39 - 117 U/L Final    Total Bilirubin 09/17/2023 0.2  0.0 - 1.2 mg/dL Final    Globulin 09/17/2023 2.3  gm/dL Final    A/G Ratio 09/17/2023 1.6  g/dL Final    BUN/Creatinine Ratio 09/17/2023 8.6  7.0 - 25.0 Final    Anion Gap 09/17/2023 6.8  5.0 - 15.0 mmol/L Final    eGFR 09/17/2023 111.8  >60.0 mL/min/1.73 Final    WBC 09/17/2023 4.81  3.40 - 10.80 10*3/mm3 Final    RBC 09/17/2023 4.45  3.77 - 5.28 10*6/mm3 Final    Hemoglobin 09/17/2023 13.7  12.0 - 15.9 g/dL Final    Hematocrit 09/17/2023 41.7  34.0 - 46.6 % Final    MCV 09/17/2023 93.7  79.0 - 97.0 fL Final    MCH 09/17/2023 30.8  26.6 - 33.0 pg Final    MCHC 09/17/2023 32.9  31.5 - 35.7 g/dL Final    RDW 09/17/2023 13.0  12.3 - 15.4 % Final    RDW-SD 09/17/2023 44.2  37.0 - 54.0 fl Final    MPV 09/17/2023 10.3  6.0 - 12.0 fL Final    Platelets 09/17/2023 131 (L)  140 - 450 10*3/mm3 Final    Neutrophil % 09/17/2023 63.3  42.7 - 76.0 % Final    Lymphocyte % 09/17/2023  32.0  19.6 - 45.3 % Final    Monocyte % 09/17/2023 3.3 (L)  5.0 - 12.0 % Final    Eosinophil % 09/17/2023 1.0  0.3 - 6.2 % Final    Basophil % 09/17/2023 0.2  0.0 - 1.5 % Final    Immature Grans % 09/17/2023 0.2  0.0 - 0.5 % Final    Neutrophils, Absolute 09/17/2023 3.04  1.70 - 7.00 10*3/mm3 Final    Lymphocytes, Absolute 09/17/2023 1.54  0.70 - 3.10 10*3/mm3 Final    Monocytes, Absolute 09/17/2023 0.16  0.10 - 0.90 10*3/mm3 Final    Eosinophils, Absolute 09/17/2023 0.05  0.00 - 0.40 10*3/mm3 Final    Basophils, Absolute 09/17/2023 0.01  0.00 - 0.20 10*3/mm3 Final    Immature Grans, Absolute 09/17/2023 0.01  0.00 - 0.05 10*3/mm3 Final    nRBC 09/17/2023 0.0  0.0 - 0.2 /100 WBC Final    Magnesium 09/17/2023 1.8  1.6 - 2.6 mg/dL Final    Fentanyl, Urine 09/17/2023 Negative  Negative Final    RBC, UA 09/17/2023 Too Numerous to Count (A)  None Seen, 0-2 /HPF Final    WBC, UA 09/17/2023 0-2  None Seen, 0-2 /HPF Final    Bacteria, UA 09/17/2023 None Seen  None Seen /HPF Final    Squamous Epithelial Cells, UA 09/17/2023 0-2  None Seen, 0-2 /HPF Final    Hyaline Casts, UA 09/17/2023 None Seen  None Seen /LPF Final    Methodology 09/17/2023 Automated Microscopy   Final        Procedure:   Urethral dilation-after an appropriate informed consent, the patient was brought to the procedure suite.  The urethra was gently anesthetized with 10 mL of 2% viscous Xylocaine jelly.  After an adequate period of topical anesthesia I went ahead and dilated with [place] sounds from 16 to 26 [place] sequentially without complication. The patient was given gentamicin as prophylaxis with 80 mg.    Assessment/Plan:   Posttraumatic urethral stricture status post dilation.  Narcotic pain medication-patient has significant acute pain that I believe would be an indication for the use of narcotic pain medication.  I discussed the significant risks of pain medication and the fact that this will be a short only option and I will give her no more than  a three-day supply of pain medication, I will not plan long-term medication, and that this will be sent to a pain clinic if it at all becomes necessary.  We discussed signing a pain medication agreement and the fact that we're going to run a state [place] review to be sure the patient is not getting pain medication from elsewhere.  If this is the case, we will not give pain medication as part of the patient's treatment plan of there being prescribed a controlled substance with potential for abuse.  This patient has been well aware of the appropriate dose of such medications including the risks for somnolence, limited ability to drive and/or safety and the significant potential for overdose.  It has been made clear that these medications are for the prescribed patient only without concomitant use of alcohol or other substance unless prescribed by the medical provider.  Has completed prescribing agreement detailing the terms of continue prescribing him a controlled substance including monitoring [place] reports, the possibility of urine drug screens, and pill counts.  The patient is aware that we review [place]BERT[place] reports on a regular basis and scan them into the chart.  History and physical examination exhibited continued safe and appropriate use of controlled substances. We also discussed the fact that the new [place] legislation allows only a three-day prescription for pain medication.  In this situation he will be referred to a chronic pain clinic.  Nausea and vomiting-patient has significant nausea and vomiting as a consequence of this.  We recommended Phenergan.  We also discussed the use of Zofran and the sedating side effects of Phenergan as well.      This document has been electronically signed by VERENICE FINK MD December 14, 2023 10:11 EST    Dictated Utilizing Dragon Dictation: Part of this note may be an electronic transcription/translation of spoken language to printed text using the Dragon Dictation  System.

## 2024-01-03 ENCOUNTER — TELEPHONE (OUTPATIENT)
Dept: UROLOGY | Facility: CLINIC | Age: 49
End: 2024-01-03
Payer: COMMERCIAL

## 2024-01-03 NOTE — TELEPHONE ENCOUNTER
Patient scheduled for 01/11/24, is asking if she can come in tomorrow for dilation. Please advise.

## 2024-01-06 ENCOUNTER — HOSPITAL ENCOUNTER (EMERGENCY)
Facility: HOSPITAL | Age: 49
Discharge: HOME | End: 2024-01-06
Attending: STUDENT IN AN ORGANIZED HEALTH CARE EDUCATION/TRAINING PROGRAM
Payer: COMMERCIAL

## 2024-01-06 VITALS
HEIGHT: 68 IN | HEART RATE: 102 BPM | DIASTOLIC BLOOD PRESSURE: 87 MMHG | WEIGHT: 110 LBS | TEMPERATURE: 98 F | RESPIRATION RATE: 18 BRPM | SYSTOLIC BLOOD PRESSURE: 131 MMHG | BODY MASS INDEX: 16.67 KG/M2 | OXYGEN SATURATION: 95 %

## 2024-01-06 DIAGNOSIS — G43.909 MIGRAINE WITHOUT STATUS MIGRAINOSUS, NOT INTRACTABLE, UNSPECIFIED MIGRAINE TYPE: Primary | ICD-10-CM

## 2024-01-06 LAB
ALBUMIN SERPL-MCNC: 3.6 G/DL (ref 3.5–5.2)
ALBUMIN/GLOB SERPL: 1.4 G/DL
ALP SERPL-CCNC: 75 U/L (ref 39–117)
ALT SERPL W P-5'-P-CCNC: 38 U/L (ref 1–33)
ANION GAP SERPL CALCULATED.3IONS-SCNC: 10 MMOL/L (ref 5–15)
AST SERPL-CCNC: 32 U/L (ref 1–32)
BASOPHILS # BLD AUTO: 0.02 10*3/MM3 (ref 0–0.2)
BASOPHILS NFR BLD AUTO: 0.4 % (ref 0–1.5)
BILIRUB SERPL-MCNC: 0.3 MG/DL (ref 0–1.2)
BUN SERPL-MCNC: 8 MG/DL (ref 6–20)
BUN/CREAT SERPL: 11.9 (ref 7–25)
CALCIUM SPEC-SCNC: 9.3 MG/DL (ref 8.6–10.5)
CHLORIDE SERPL-SCNC: 105 MMOL/L (ref 98–107)
CO2 SERPL-SCNC: 25 MMOL/L (ref 22–29)
CREAT SERPL-MCNC: 0.67 MG/DL (ref 0.57–1)
DEPRECATED RDW RBC AUTO: 46.5 FL (ref 37–54)
EGFRCR SERPLBLD CKD-EPI 2021: 108 ML/MIN/1.73
EOSINOPHIL # BLD AUTO: 0.17 10*3/MM3 (ref 0–0.4)
EOSINOPHIL NFR BLD AUTO: 3.1 % (ref 0.3–6.2)
ERYTHROCYTE [DISTWIDTH] IN BLOOD BY AUTOMATED COUNT: 13.2 % (ref 12.3–15.4)
GLOBULIN UR ELPH-MCNC: 2.6 GM/DL
GLUCOSE SERPL-MCNC: 93 MG/DL (ref 65–99)
HCT VFR BLD AUTO: 44.4 % (ref 34–46.6)
HGB BLD-MCNC: 14.4 G/DL (ref 12–15.9)
HOLD SPECIMEN: NORMAL
HOLD SPECIMEN: NORMAL
IMM GRANULOCYTES # BLD AUTO: 0.01 10*3/MM3 (ref 0–0.05)
IMM GRANULOCYTES NFR BLD AUTO: 0.2 % (ref 0–0.5)
LYMPHOCYTES # BLD AUTO: 2.45 10*3/MM3 (ref 0.7–3.1)
LYMPHOCYTES NFR BLD AUTO: 44 % (ref 19.6–45.3)
MAGNESIUM SERPL-MCNC: 1.8 MG/DL (ref 1.6–2.6)
MCH RBC QN AUTO: 30.8 PG (ref 26.6–33)
MCHC RBC AUTO-ENTMCNC: 32.4 G/DL (ref 31.5–35.7)
MCV RBC AUTO: 95.1 FL (ref 79–97)
MONOCYTES # BLD AUTO: 0.35 10*3/MM3 (ref 0.1–0.9)
MONOCYTES NFR BLD AUTO: 6.3 % (ref 5–12)
NEUTROPHILS NFR BLD AUTO: 2.57 10*3/MM3 (ref 1.7–7)
NEUTROPHILS NFR BLD AUTO: 46 % (ref 42.7–76)
NRBC BLD AUTO-RTO: 0 /100 WBC (ref 0–0.2)
PLATELET # BLD AUTO: 158 10*3/MM3 (ref 140–450)
PMV BLD AUTO: 10.1 FL (ref 6–12)
POTASSIUM SERPL-SCNC: 4.2 MMOL/L (ref 3.5–5.2)
PROT SERPL-MCNC: 6.2 G/DL (ref 6–8.5)
RBC # BLD AUTO: 4.67 10*6/MM3 (ref 3.77–5.28)
SODIUM SERPL-SCNC: 140 MMOL/L (ref 136–145)
WBC NRBC COR # BLD AUTO: 5.57 10*3/MM3 (ref 3.4–10.8)
WHOLE BLOOD HOLD COAG: NORMAL
WHOLE BLOOD HOLD SPECIMEN: NORMAL

## 2024-01-06 PROCEDURE — 99283 EMERGENCY DEPT VISIT LOW MDM: CPT

## 2024-01-06 PROCEDURE — 85025 COMPLETE CBC W/AUTO DIFF WBC: CPT | Performed by: PHYSICIAN ASSISTANT

## 2024-01-06 PROCEDURE — 36415 COLL VENOUS BLD VENIPUNCTURE: CPT

## 2024-01-06 PROCEDURE — 96376 TX/PRO/DX INJ SAME DRUG ADON: CPT

## 2024-01-06 PROCEDURE — 96361 HYDRATE IV INFUSION ADD-ON: CPT

## 2024-01-06 PROCEDURE — 96374 THER/PROPH/DIAG INJ IV PUSH: CPT

## 2024-01-06 PROCEDURE — 96375 TX/PRO/DX INJ NEW DRUG ADDON: CPT

## 2024-01-06 PROCEDURE — 25010000002 ONDANSETRON PER 1 MG: Performed by: PHYSICIAN ASSISTANT

## 2024-01-06 PROCEDURE — 80053 COMPREHEN METABOLIC PANEL: CPT | Performed by: PHYSICIAN ASSISTANT

## 2024-01-06 PROCEDURE — 25010000002 MORPHINE PER 10 MG: Performed by: STUDENT IN AN ORGANIZED HEALTH CARE EDUCATION/TRAINING PROGRAM

## 2024-01-06 PROCEDURE — 25810000003 SODIUM CHLORIDE 0.9 % SOLUTION: Performed by: PHYSICIAN ASSISTANT

## 2024-01-06 PROCEDURE — 25010000002 DEXAMETHASONE SODIUM PHOSPHATE 10 MG/ML SOLUTION: Performed by: PHYSICIAN ASSISTANT

## 2024-01-06 PROCEDURE — 83735 ASSAY OF MAGNESIUM: CPT | Performed by: PHYSICIAN ASSISTANT

## 2024-01-06 PROCEDURE — 25010000002 DIPHENHYDRAMINE PER 50 MG: Performed by: PHYSICIAN ASSISTANT

## 2024-01-06 RX ORDER — DIPHENHYDRAMINE HYDROCHLORIDE 50 MG/ML
25 INJECTION INTRAMUSCULAR; INTRAVENOUS ONCE
Status: COMPLETED | OUTPATIENT
Start: 2024-01-06 | End: 2024-01-06

## 2024-01-06 RX ORDER — MORPHINE SULFATE 2 MG/ML
2 INJECTION, SOLUTION INTRAMUSCULAR; INTRAVENOUS ONCE
Status: COMPLETED | OUTPATIENT
Start: 2024-01-06 | End: 2024-01-06

## 2024-01-06 RX ORDER — SODIUM CHLORIDE 0.9 % (FLUSH) 0.9 %
10 SYRINGE (ML) INJECTION AS NEEDED
Status: DISCONTINUED | OUTPATIENT
Start: 2024-01-06 | End: 2024-01-06 | Stop reason: HOSPADM

## 2024-01-06 RX ORDER — ONDANSETRON 2 MG/ML
4 INJECTION INTRAMUSCULAR; INTRAVENOUS ONCE
Status: COMPLETED | OUTPATIENT
Start: 2024-01-06 | End: 2024-01-06

## 2024-01-06 RX ORDER — DEXAMETHASONE SODIUM PHOSPHATE 10 MG/ML
10 INJECTION, SOLUTION INTRAMUSCULAR; INTRAVENOUS ONCE
Status: COMPLETED | OUTPATIENT
Start: 2024-01-06 | End: 2024-01-06

## 2024-01-06 RX ADMIN — MORPHINE SULFATE 2 MG: 2 INJECTION, SOLUTION INTRAMUSCULAR; INTRAVENOUS at 19:06

## 2024-01-06 RX ADMIN — SODIUM CHLORIDE 1500 ML: 9 INJECTION, SOLUTION INTRAVENOUS at 18:24

## 2024-01-06 RX ADMIN — ONDANSETRON 4 MG: 2 INJECTION INTRAMUSCULAR; INTRAVENOUS at 18:07

## 2024-01-06 RX ADMIN — MORPHINE SULFATE 2 MG: 2 INJECTION, SOLUTION INTRAMUSCULAR; INTRAVENOUS at 18:07

## 2024-01-06 RX ADMIN — DIPHENHYDRAMINE HYDROCHLORIDE 25 MG: 50 INJECTION, SOLUTION INTRAMUSCULAR; INTRAVENOUS at 18:07

## 2024-01-06 RX ADMIN — DEXAMETHASONE SODIUM PHOSPHATE 10 MG: 10 INJECTION INTRAMUSCULAR; INTRAVENOUS at 18:07

## 2024-01-07 NOTE — DISCHARGE INSTRUCTIONS
Rest at home, make sure you are drinking plenty of fluids.  Follow-up with your family doctor in the office at the next available appointment.  Continue your home medications as prescribed.  Return to the ED at any time if symptoms change or worsen.

## 2024-01-07 NOTE — ED NOTES
"Provided patient with urine cup. Informed patient of need for urine sample. Patient states \" Im being discharged.\" Nurse aware.  "

## 2024-01-07 NOTE — ED PROVIDER NOTES
Subjective   History of Present Illness  48-year-old female who presents to the ED today for a migraine headache.  She states she has had this headache for about 10 days.  She states it is all in the frontal area.  She states her neck is started to get sore which she believes is from tension.  She has had nausea and vomiting and feels dehydrated.  She denies any fever.  She does report that she has been under a lot of stress recently.  She states she took Imitrex about 4 5 hours ago and Phenergan about an hour ago without much relief of her symptoms.  She denies any fever.  She denies any nuchal rigidity.  She denies any sinus symptoms.    History provided by:  Patient  Headache  Pain location:  Frontal  Quality:  Dull  Radiates to:  Does not radiate  Onset quality:  Gradual  Duration:  10 days  Timing:  Constant  Progression:  Unchanged  Chronicity:  Recurrent  Similar to prior headaches: yes    Relieved by:  Nothing  Worsened by:  Light and sound  Associated symptoms: fatigue, nausea, neck pain and vomiting    Associated symptoms: no abdominal pain, no back pain, no blurred vision, no congestion, no cough, no diarrhea, no dizziness, no drainage, no ear pain, no eye pain, no facial pain, no fever, no focal weakness, no hearing loss, no loss of balance, no myalgias, no near-syncope, no neck stiffness, no numbness, no paresthesias, no photophobia, no seizures, no sinus pressure, no sore throat, no swollen glands, no syncope, no tingling, no URI, no visual change and no weakness        Review of Systems   Constitutional:  Positive for fatigue. Negative for fever.   HENT:  Negative for congestion, ear pain, hearing loss, postnasal drip, sinus pressure and sore throat.    Eyes:  Negative for blurred vision, photophobia and pain.   Respiratory:  Negative for cough.    Cardiovascular: Negative.  Negative for syncope and near-syncope.   Gastrointestinal:  Positive for nausea and vomiting. Negative for abdominal pain and  diarrhea.   Genitourinary: Negative.    Musculoskeletal:  Positive for neck pain. Negative for back pain, myalgias and neck stiffness.   Skin: Negative.    Neurological:  Positive for headaches. Negative for dizziness, focal weakness, seizures, weakness, numbness, paresthesias and loss of balance.   Psychiatric/Behavioral: Negative.     All other systems reviewed and are negative.      Past Medical History:   Diagnosis Date    Hoyos's disease     Bipolar 1 disorder     Constipation     DDD (degenerative disc disease), cervical 05/29/2017    Fibromyalgia     IBS (irritable bowel syndrome)     Meningioma     Migraine     PONV (postoperative nausea and vomiting)     PTSD (post-traumatic stress disorder)     Rectal bleeding        Allergies   Allergen Reactions    Ativan [Lorazepam] Hallucinations     confusion    Sulfa Antibiotics Shortness Of Breath and Swelling    Sulfa Antibiotics Anaphylaxis    Reglan [Metoclopramide] Angioedema    Compazine [Prochlorperazine Edisylate] Hives    Demerol [Meperidine] Hives    Droperidol Itching    Metoclopramide Swelling    Toradol [Ketorolac Tromethamine] Hives and Itching    Toradol [Ketorolac Tromethamine] Hives    Zosyn [Piperacillin Sod-Tazobactam So] Hives       Past Surgical History:   Procedure Laterality Date    ANAL SCOPE N/A 7/28/2016    Procedure: ANAL SCOPE;  Surgeon: Kael Lopez MD;  Location: [facility] OR;  Service:     APPENDECTOMY      COLONOSCOPY N/A 6/30/2016    Procedure: COLONOSCOPY  CPTCODE:94465;  Surgeon: Jose Antonio Belle III, MD;  Location: [facility] OR;  Service:     COLONOSCOPY N/A 7/7/2016    Procedure: COLONOSCOPY (91153) CPT;  Surgeon: Jose Antonio Belle III, MD;  Location: [facility] OR;  Service:     CYSTOSCOPY RETROGRADE PYELOGRAM Bilateral 4/28/2017    Procedure: CYSTOSCOPY RETROGRADE PYELOGRAM;  Surgeon: Mu Blunt MD;  Location: [facility] OR;  Service:     ENDOSCOPY N/A 6/30/2016    Procedure: ESOPHAGOGASTRODUODENOSCOPY WITH BIOPSY   CPTCODE:62973;  Surgeon: Jose Antonio Belle III, MD;  Location: [facility] OR;  Service:     HEMORRHOIDECTOMY N/A 2016    Procedure: HEMORRHOID STAPLING;  Surgeon: Kael Lopez MD;  Location: [facility] OR;  Service:     HYSTERECTOMY      KNEE SURGERY      LAPAROSCOPIC SALPINGOOPHERECTOMY      PORTACATH PLACEMENT N/A 2017    Procedure: INSERTION OF PORTACATH;  Surgeon: Celso Arredondo MD;  Location: [facility] OR;  Service:     SHOULDER SURGERY      3 times       Family History   Problem Relation Age of Onset    Crohn's disease Other     Hypertension Other     Diabetes Other     Irritable bowel syndrome Other     No Known Problems Father     No Known Problems Mother        Social History     Socioeconomic History    Marital status:    Tobacco Use    Smoking status: Former     Packs/day: 1.00     Years: 20.00     Additional pack years: 0.00     Total pack years: 20.00     Types: Cigarettes     Quit date: 2015     Years since quittin.1     Passive exposure: Past    Smokeless tobacco: Never   Vaping Use    Vaping Use: Never used   Substance and Sexual Activity    Alcohol use: No    Drug use: Yes     Types: Marijuana     Comment: for pain    Sexual activity: Defer     Birth control/protection: Surgical           Objective   Physical Exam  Vitals and nursing note reviewed.   Constitutional:       General: She is not in acute distress.     Appearance: Normal appearance. She is not diaphoretic.   HENT:      Head: Normocephalic and atraumatic.      Right Ear: External ear normal.      Left Ear: External ear normal.      Nose: Nose normal.   Eyes:      Extraocular Movements: Extraocular movements intact.      Conjunctiva/sclera: Conjunctivae normal.      Pupils: Pupils are equal, round, and reactive to light.   Cardiovascular:      Rate and Rhythm: Regular rhythm. Tachycardia present.      Pulses: Normal pulses.      Heart sounds: Normal heart sounds.   Pulmonary:      Effort: Pulmonary effort is  normal.      Breath sounds: Normal breath sounds.   Abdominal:      General: Bowel sounds are normal.      Palpations: Abdomen is soft.   Musculoskeletal:         General: Normal range of motion.      Cervical back: Normal range of motion and neck supple.   Skin:     General: Skin is warm and dry.      Capillary Refill: Capillary refill takes less than 2 seconds.   Neurological:      General: No focal deficit present.      Mental Status: She is alert and oriented to person, place, and time.   Psychiatric:         Mood and Affect: Mood normal.         Procedures       Results for orders placed or performed during the hospital encounter of 01/06/24   Comprehensive Metabolic Panel    Specimen: Blood   Result Value Ref Range    Glucose 93 65 - 99 mg/dL    BUN 8 6 - 20 mg/dL    Creatinine 0.67 0.57 - 1.00 mg/dL    Sodium 140 136 - 145 mmol/L    Potassium 4.2 3.5 - 5.2 mmol/L    Chloride 105 98 - 107 mmol/L    CO2 25.0 22.0 - 29.0 mmol/L    Calcium 9.3 8.6 - 10.5 mg/dL    Total Protein 6.2 6.0 - 8.5 g/dL    Albumin 3.6 3.5 - 5.2 g/dL    ALT (SGPT) 38 (H) 1 - 33 U/L    AST (SGOT) 32 1 - 32 U/L    Alkaline Phosphatase 75 39 - 117 U/L    Total Bilirubin 0.3 0.0 - 1.2 mg/dL    Globulin 2.6 gm/dL    A/G Ratio 1.4 g/dL    BUN/Creatinine Ratio 11.9 7.0 - 25.0    Anion Gap 10.0 5.0 - 15.0 mmol/L    eGFR 108.0 >60.0 mL/min/1.73   Magnesium    Specimen: Blood   Result Value Ref Range    Magnesium 1.8 1.6 - 2.6 mg/dL   CBC Auto Differential    Specimen: Blood   Result Value Ref Range    WBC 5.57 3.40 - 10.80 10*3/mm3    RBC 4.67 3.77 - 5.28 10*6/mm3    Hemoglobin 14.4 12.0 - 15.9 g/dL    Hematocrit 44.4 34.0 - 46.6 %    MCV 95.1 79.0 - 97.0 fL    MCH 30.8 26.6 - 33.0 pg    MCHC 32.4 31.5 - 35.7 g/dL    RDW 13.2 12.3 - 15.4 %    RDW-SD 46.5 37.0 - 54.0 fl    MPV 10.1 6.0 - 12.0 fL    Platelets 158 140 - 450 10*3/mm3    Neutrophil % 46.0 42.7 - 76.0 %    Lymphocyte % 44.0 19.6 - 45.3 %    Monocyte % 6.3 5.0 - 12.0 %    Eosinophil %  3.1 0.3 - 6.2 %    Basophil % 0.4 0.0 - 1.5 %    Immature Grans % 0.2 0.0 - 0.5 %    Neutrophils, Absolute 2.57 1.70 - 7.00 10*3/mm3    Lymphocytes, Absolute 2.45 0.70 - 3.10 10*3/mm3    Monocytes, Absolute 0.35 0.10 - 0.90 10*3/mm3    Eosinophils, Absolute 0.17 0.00 - 0.40 10*3/mm3    Basophils, Absolute 0.02 0.00 - 0.20 10*3/mm3    Immature Grans, Absolute 0.01 0.00 - 0.05 10*3/mm3    nRBC 0.0 0.0 - 0.2 /100 WBC          ED Course                                             Medical Decision Making  48-year-old female who presents to the ED today for a migraine headache.  She received IV medicines and IV fluids.  She reports that she is feeling better and is ready to go home.  She was advised to follow-up as an outpatient and to return to the ED at any time if symptoms change or worsen.    Problems Addressed:  Migraine without status migrainosus, not intractable, unspecified migraine type: complicated acute illness or injury    Amount and/or Complexity of Data Reviewed  Labs: ordered.    Risk  Prescription drug management.        Final diagnoses:   Migraine without status migrainosus, not intractable, unspecified migraine type       ED Disposition  ED Disposition       ED Disposition   Discharge    Condition   Stable    Comment   --               Mabel Patterson, APRN  [address] [address]  [phone]    Schedule an appointment as soon as possible for a visit in 2 days           Medication List      No changes were made to your prescriptions during this visit.            Kristin Shell PA  01/06/24 2702

## 2024-01-09 ENCOUNTER — OFFICE VISIT (OUTPATIENT)
Dept: UROLOGY | Facility: CLINIC | Age: 49
End: 2024-01-09
Payer: COMMERCIAL

## 2024-01-09 VITALS
SYSTOLIC BLOOD PRESSURE: 123 MMHG | WEIGHT: 109.6 LBS | HEIGHT: 68 IN | HEART RATE: 80 BPM | BODY MASS INDEX: 16.61 KG/M2 | DIASTOLIC BLOOD PRESSURE: 83 MMHG

## 2024-01-09 DIAGNOSIS — Z48.816 AFTERCARE FOLLOWING SURGERY OF THE GENITOURINARY SYSTEM: Primary | ICD-10-CM

## 2024-01-09 DIAGNOSIS — R35.0 FREQUENCY OF MICTURITION: ICD-10-CM

## 2024-01-09 DIAGNOSIS — N35.028 OTHER POST-TRAUMATIC URETHRAL STRICTURE, FEMALE: ICD-10-CM

## 2024-01-09 DIAGNOSIS — N23 RENAL COLIC: ICD-10-CM

## 2024-01-09 RX ORDER — OXYCODONE AND ACETAMINOPHEN 10; 325 MG/1; MG/1
1 TABLET ORAL EVERY 6 HOURS PRN
Qty: 16 TABLET | Refills: 0 | Status: SHIPPED | OUTPATIENT
Start: 2024-01-09

## 2024-01-09 RX ORDER — PROMETHAZINE HYDROCHLORIDE 25 MG/1
25 TABLET ORAL EVERY 6 HOURS PRN
Qty: 21 TABLET | Refills: 5 | Status: SHIPPED | OUTPATIENT
Start: 2024-01-09

## 2024-01-09 RX ORDER — GENTAMICIN SULFATE 40 MG/ML
80 INJECTION, SOLUTION INTRAMUSCULAR; INTRAVENOUS ONCE
Status: COMPLETED | OUTPATIENT
Start: 2024-01-09 | End: 2024-01-09

## 2024-01-09 RX ADMIN — GENTAMICIN SULFATE 80 MG: 40 INJECTION, SOLUTION INTRAMUSCULAR; INTRAVENOUS at 10:17

## 2024-01-09 NOTE — PROGRESS NOTES
Chief Complaint:      Chief Complaint   Patient presents with    Urethral stricture     Follow up / Dilation        HPI:   48 y.o. female here for urethral dilation    Past Medical History:     Past Medical History:   Diagnosis Date    Hoyos's disease     Bipolar 1 disorder     Constipation     DDD (degenerative disc disease), cervical 05/29/2017    Fibromyalgia     IBS (irritable bowel syndrome)     Meningioma     Migraine     PONV (postoperative nausea and vomiting)     PTSD (post-traumatic stress disorder)     Rectal bleeding        Current Meds:     Current Outpatient Medications   Medication Sig Dispense Refill    albuterol (PROVENTIL HFA;VENTOLIN HFA) 108 (90 Base) MCG/ACT inhaler Inhale 2 puffs 2 (Two) Times a Day.      Azelastine HCl 137 MCG/SPRAY solution 1 spray into the nostril(s) as directed by provider As Needed (Allergies).      busPIRone (BUSPAR) 15 MG tablet Take 1 tablet by mouth 3 (Three) Times a Day.      diclofenac (VOLTAREN) 1 % gel gel       divalproex (DEPAKOTE) 500 MG DR tablet Take 1 tablet by mouth 3 (Three) Times a Day. 90 tablet 2    docusate sodium (COLACE) 100 MG capsule Take 1 capsule by mouth 2 (Two) Times a Day. 20 capsule 0    docusate sodium (COLACE) 100 MG capsule Take 1 capsule by mouth 2 (Two) Times a Day As Needed for Constipation. 60 capsule 0    fluticasone (VERAMYST) 27.5 MCG/SPRAY nasal spray 2 sprays into the nostril(s) as directed by provider Daily.      megestrol (MEGACE) 20 MG tablet Take 1 tablet by mouth Daily. 30 tablet 3    methylPREDNISolone (MEDROL) 4 MG dose pack Take as directed on package instructions. 21 tablet 0    metroNIDAZOLE (FLAGYL) 500 MG tablet Take 1 tablet by mouth 3 (Three) Times a Day. 30 tablet 0    montelukast (SINGULAIR) 10 MG tablet Take 1 tablet by mouth Every Night.      nitrofurantoin, macrocrystal-monohydrate, (MACROBID) 100 MG capsule Take 1 capsule by mouth 2 (Two) Times a Day. 13 capsule 0    ondansetron ODT (ZOFRAN-ODT) 4 MG  disintegrating tablet Place 2 tablets on the tongue Every 8 (Eight) Hours As Needed for Nausea or Vomiting. 30 tablet 0    oxyCODONE-acetaminophen (Percocet)  MG per tablet Take 1 tablet by mouth Every 6 (Six) Hours As Needed for Moderate Pain. 16 tablet 0    oxyCODONE-acetaminophen (Percocet)  MG per tablet Take 1 tablet by mouth Every 6 (Six) Hours As Needed for Moderate Pain. 20 tablet 0    oxyCODONE-acetaminophen (PERCOCET) 5-325 MG per tablet Take 1 tablet by mouth Every 8 (Eight) Hours As Needed for Moderate Pain. 12 tablet 0    oxyCODONE-acetaminophen (PERCOCET) 5-325 MG per tablet Take 1 tablet by mouth Every 6 (Six) Hours As Needed for Moderate Pain. 2 tablet 0    pantoprazole (PROTONIX) 40 MG EC tablet Take 1 tablet by mouth 2 (Two) Times a Day As Needed.      phenazopyridine (PYRIDIUM) 100 MG tablet Take 1 tablet by mouth 3 (Three) Times a Day As Needed for Bladder Spasms. 30 tablet 1    polyethylene glycol (MIRALAX) 17 GM/SCOOP powder Take 17 g by mouth Daily. 225 g 0    promethazine (PHENERGAN) 25 MG tablet Take 1 tablet by mouth Every 6 (Six) Hours As Needed for Nausea or Vomiting. 21 tablet 5    promethazine (PHENERGAN) 25 MG tablet Take 1 tablet by mouth Every 6 (Six) Hours As Needed for Nausea or Vomiting. 28 tablet 4    sertraline (ZOLOFT) 100 MG tablet Take 1 tablet by mouth 2 (Two) Times a Day.      SUMAtriptan (IMITREX) 6 MG/0.5ML injection Inject prescribed dose at onset of headache. May repeat dose one time in 1 hour(s) if headache not relieved.      terazosin (HYTRIN) 2 MG capsule Take 1 capsule by mouth Every Night for 14 days. 14 capsule 0    traMADol (ULTRAM) 50 MG tablet        No current facility-administered medications for this visit.        Allergies:      Allergies   Allergen Reactions    Ativan [Lorazepam] Hallucinations     confusion    Sulfa Antibiotics Shortness Of Breath and Swelling    Sulfa Antibiotics Anaphylaxis    Reglan [Metoclopramide] Angioedema    Compazine  [Prochlorperazine Edisylate] Hives    Demerol [Meperidine] Hives    Droperidol Itching    Metoclopramide Swelling    Toradol [Ketorolac Tromethamine] Hives and Itching    Toradol [Ketorolac Tromethamine] Hives    Zosyn [Piperacillin Sod-Tazobactam So] Hives        Past Surgical History:     Past Surgical History:   Procedure Laterality Date    ANAL SCOPE N/A 2016    Procedure: ANAL SCOPE;  Surgeon: Kael Lopez MD;  Location: [facility] OR;  Service:     APPENDECTOMY      COLONOSCOPY N/A 2016    Procedure: COLONOSCOPY  CPTCODE:78752;  Surgeon: Jose Antonio Belle III, MD;  Location: [facility] OR;  Service:     COLONOSCOPY N/A 2016    Procedure: COLONOSCOPY (23099) CPT;  Surgeon: Jose Antonio Belle III, MD;  Location: [facility] OR;  Service:     CYSTOSCOPY RETROGRADE PYELOGRAM Bilateral 2017    Procedure: CYSTOSCOPY RETROGRADE PYELOGRAM;  Surgeon: Mu Blunt MD;  Location: [facility] OR;  Service:     ENDOSCOPY N/A 2016    Procedure: ESOPHAGOGASTRODUODENOSCOPY WITH BIOPSY  CPTCODE:01560;  Surgeon: Jose Antonio Belle III, MD;  Location: [facility] OR;  Service:     HEMORRHOIDECTOMY N/A 2016    Procedure: HEMORRHOID STAPLING;  Surgeon: Kael Lopez MD;  Location: [facility] OR;  Service:     HYSTERECTOMY      KNEE SURGERY      LAPAROSCOPIC SALPINGOOPHERECTOMY      PORTACATH PLACEMENT N/A 2017    Procedure: INSERTION OF PORTACATH;  Surgeon: Celso Arredondo MD;  Location: [facility] OR;  Service:     SHOULDER SURGERY      3 times       Social History:     Social History     Socioeconomic History    Marital status:    Tobacco Use    Smoking status: Former     Packs/day: 1.00     Years: 20.00     Additional pack years: 0.00     Total pack years: 20.00     Types: Cigarettes     Quit date: 2015     Years since quittin.1     Passive exposure: Past    Smokeless tobacco: Never   Vaping Use    Vaping Use: Never used   Substance and Sexual Activity    Alcohol use: No    Drug use:  Yes     Types: Marijuana     Comment: for pain    Sexual activity: Defer     Birth control/protection: Surgical       Family History:     Family History   Problem Relation Age of Onset    Crohn's disease Other     Hypertension Other     Diabetes Other     Irritable bowel syndrome Other     No Known Problems Father     No Known Problems Mother        Review of Systems:     Review of Systems   Constitutional: Negative.  Negative for activity change, appetite change, chills, diaphoresis, fatigue and unexpected weight change.   HENT:  Negative for congestion, dental problem, drooling, ear discharge, ear pain, facial swelling, hearing loss, mouth sores, nosebleeds, postnasal drip, rhinorrhea, sinus pressure, sneezing, sore throat, tinnitus, trouble swallowing and voice change.    Eyes: Negative.  Negative for photophobia, pain, discharge, redness, itching and visual disturbance.   Respiratory: Negative.  Negative for apnea, cough, choking, chest tightness, shortness of breath, wheezing and stridor.    Cardiovascular: Negative.  Negative for chest pain, palpitations and leg swelling.   Gastrointestinal: Negative.  Negative for abdominal distention, abdominal pain, anal bleeding, blood in stool, constipation, diarrhea, nausea, rectal pain and vomiting.   Endocrine: Negative.  Negative for cold intolerance, heat intolerance, polydipsia, polyphagia and polyuria.   Genitourinary:  Positive for difficulty urinating.   Musculoskeletal: Negative.  Negative for arthralgias, back pain, gait problem, joint swelling, myalgias, neck pain and neck stiffness.   Skin: Negative.  Negative for color change, pallor, rash and wound.   Allergic/Immunologic: Negative.  Negative for environmental allergies, food allergies and immunocompromised state.   Neurological: Negative.  Negative for dizziness, tremors, seizures, syncope, facial asymmetry, speech difficulty, weakness, light-headedness, numbness and headaches.   Hematological: Negative.   Negative for adenopathy. Does not bruise/bleed easily.   Psychiatric/Behavioral:  Negative for agitation, behavioral problems, confusion, decreased concentration, dysphoric mood, hallucinations, self-injury, sleep disturbance and suicidal ideas. The patient is not nervous/anxious and is not hyperactive.    All other systems reviewed and are negative.      Physical Exam:     Physical Exam  Constitutional:       Appearance: She is well-developed.   HENT:      Head: Normocephalic and atraumatic.      Right Ear: External ear normal.      Left Ear: External ear normal.   Eyes:      Conjunctiva/sclera: Conjunctivae normal.      Pupils: Pupils are equal, round, and reactive to light.   Cardiovascular:      Rate and Rhythm: Normal rate and regular rhythm.      Heart sounds: Normal heart sounds.   Pulmonary:      Effort: Pulmonary effort is normal.      Breath sounds: Normal breath sounds.   Abdominal:      General: Bowel sounds are normal. There is no distension.      Palpations: Abdomen is soft. There is no mass.      Tenderness: There is no abdominal tenderness. There is no guarding or rebound.   Genitourinary:     General: Normal vulva.      Vagina: No vaginal discharge.   Musculoskeletal:         General: Normal range of motion.   Skin:     General: Skin is warm and dry.   Neurological:      Mental Status: She is alert.      Deep Tendon Reflexes: Reflexes are normal and symmetric.   Psychiatric:         Behavior: Behavior normal.         Thought Content: Thought content normal.         Judgment: Judgment normal.         I have reviewed the following portions of the patient's history: Allergies, current medications, past family history, past medical history, past social history, past surgical history, problem list, and ROS and confirm it is accurate.    Recent Image (CT and/or KUB):      CT Abdomen and Pelvis: No results found for this or any previous visit.       CT Stone Protocol: Results for orders placed during the  hospital encounter of 10/10/23    CT Abdomen Pelvis Stone Protocol    Narrative  CT ABDOMEN PELVIS STONE PROTOCOL-: 10/10/2023 9:19 PM    REASON FOR EXAM: Flank pain, kidney stone suspected LT FLANK PAIN.    TECHNIQUE: Noncontrast 4 mm axial sections with sagittal and coronal  reformats.    COMPARISON: 3/16/2023.    FINDINGS: There is focal bronchiectasis within the right lower lobe  (image 4/59), now with probable mucous plugging.  Noting the noncontrast  technique, the liver is unremarkable.  No focal liver lesions are  identified.  Best demonstrated on narrow windows, there is the  suggestion of cholelithiasis dependently within the gallbladder.  There  is no biliary ductal dilatation.  The spleen is at the upper limits of  normal in size.  The pancreas, adrenal glands and kidneys are within  normal limits, noting a probable cyst within the lower pole of the right  kidney (image 3/37).  There is no nephrolithiasis.  There is no abnormal  dilatation of either renal collecting system or ureter.  There is no  ureterolithiasis.  The abdominal aorta is normal in caliber, noting mild  vascular calcification.  There is a surgical anastomosis at the  rectoanal junction.  No complication is evident.  There are no findings  of bowel obstruction.  Noting the lack of enteric contrast material,  there is no abnormal bowel wall thickening.  The appendix is not  identified, however there are no findings of appendicitis.  There is no  free intraperitoneal fluid.  The uterus is absent.  The ovaries are not  identified.  There are no abnormal adnexal masses.  There is no lymph  node enlargement.  The bladder is unremarkable.  There is an old  fracture of L2.    Impression  1.  No acute abnormality of the abdomen or pelvis.  There is no  nephrolithiasis or ureterolithiasis.  2.  Incidental findings, as above, similar in appearance to 3/16/2023.      This report was finalized on 10/10/2023 11:05 PM by Steph Fabian MD.       KUB:  Results for orders placed during the hospital encounter of 01/10/23    XR Abdomen KUB    Narrative  EXAM:  XR Abdomen, 1 View    EXAM DATE:  1/10/2023 12:05 PM    CLINICAL HISTORY:  flank pain    TECHNIQUE:  Frontal supine view of the abdomen/pelvis.    COMPARISON:  10/01/2022    FINDINGS:  Gastrointestinal tract:  Unremarkable.  No dilation.  Organs:  No stones identified along the expected course of ureters.  Bones/joints:  Unremarkable.  Soft tissues:  Surgical staples in the lower pelvis.  Vasculature:  Stable phleboliths lower pelvis.    Impression  1.  No acute findings radiographically evident.  2.  No radiographic evidence of renal or ureteral stones.    This report was finalized on 1/10/2023 12:47 PM by Dr. Ankit Naik MD.       Labs (past 3 months):      Admission on 01/06/2024, Discharged on 01/06/2024   Component Date Value Ref Range Status    Glucose 01/06/2024 93  65 - 99 mg/dL Final    BUN 01/06/2024 8  6 - 20 mg/dL Final    Creatinine 01/06/2024 0.67  0.57 - 1.00 mg/dL Final    Sodium 01/06/2024 140  136 - 145 mmol/L Final    Potassium 01/06/2024 4.2  3.5 - 5.2 mmol/L Final    Slight hemolysis detected by analyzer. Result may be falsely elevated.    Chloride 01/06/2024 105  98 - 107 mmol/L Final    CO2 01/06/2024 25.0  22.0 - 29.0 mmol/L Final    Calcium 01/06/2024 9.3  8.6 - 10.5 mg/dL Final    Total Protein 01/06/2024 6.2  6.0 - 8.5 g/dL Final    Albumin 01/06/2024 3.6  3.5 - 5.2 g/dL Final    ALT (SGPT) 01/06/2024 38 (H)  1 - 33 U/L Final    AST (SGOT) 01/06/2024 32  1 - 32 U/L Final    Alkaline Phosphatase 01/06/2024 75  39 - 117 U/L Final    Total Bilirubin 01/06/2024 0.3  0.0 - 1.2 mg/dL Final    Globulin 01/06/2024 2.6  gm/dL Final    A/G Ratio 01/06/2024 1.4  g/dL Final    BUN/Creatinine Ratio 01/06/2024 11.9  7.0 - 25.0 Final    Anion Gap 01/06/2024 10.0  5.0 - 15.0 mmol/L Final    eGFR 01/06/2024 108.0  >60.0 mL/min/1.73 Final    Magnesium 01/06/2024 1.8  1.6 - 2.6 mg/dL Final    WBC  01/06/2024 5.57  3.40 - 10.80 10*3/mm3 Final    RBC 01/06/2024 4.67  3.77 - 5.28 10*6/mm3 Final    Hemoglobin 01/06/2024 14.4  12.0 - 15.9 g/dL Final    Hematocrit 01/06/2024 44.4  34.0 - 46.6 % Final    MCV 01/06/2024 95.1  79.0 - 97.0 fL Final    MCH 01/06/2024 30.8  26.6 - 33.0 pg Final    MCHC 01/06/2024 32.4  31.5 - 35.7 g/dL Final    RDW 01/06/2024 13.2  12.3 - 15.4 % Final    RDW-SD 01/06/2024 46.5  37.0 - 54.0 fl Final    MPV 01/06/2024 10.1  6.0 - 12.0 fL Final    Platelets 01/06/2024 158  140 - 450 10*3/mm3 Final    Neutrophil % 01/06/2024 46.0  42.7 - 76.0 % Final    Lymphocyte % 01/06/2024 44.0  19.6 - 45.3 % Final    Monocyte % 01/06/2024 6.3  5.0 - 12.0 % Final    Eosinophil % 01/06/2024 3.1  0.3 - 6.2 % Final    Basophil % 01/06/2024 0.4  0.0 - 1.5 % Final    Immature Grans % 01/06/2024 0.2  0.0 - 0.5 % Final    Neutrophils, Absolute 01/06/2024 2.57  1.70 - 7.00 10*3/mm3 Final    Lymphocytes, Absolute 01/06/2024 2.45  0.70 - 3.10 10*3/mm3 Final    Monocytes, Absolute 01/06/2024 0.35  0.10 - 0.90 10*3/mm3 Final    Eosinophils, Absolute 01/06/2024 0.17  0.00 - 0.40 10*3/mm3 Final    Basophils, Absolute 01/06/2024 0.02  0.00 - 0.20 10*3/mm3 Final    Immature Grans, Absolute 01/06/2024 0.01  0.00 - 0.05 10*3/mm3 Final    nRBC 01/06/2024 0.0  0.0 - 0.2 /100 WBC Final    Extra Tube 01/06/2024 Hold for add-ons.   Final    Auto resulted.    Extra Tube 01/06/2024 hold for add-on   Final    Auto resulted    Extra Tube 01/06/2024 Hold for add-ons.   Final    Auto resulted.    Extra Tube 01/06/2024 Hold for add-ons.   Final    Auto resulted   Admission on 11/30/2023, Discharged on 11/30/2023   Component Date Value Ref Range Status    COVID19 11/30/2023 Not Detected  Not Detected - Ref. Range Final    Influenza A PCR 11/30/2023 Not Detected  Not Detected Final    Influenza B PCR 11/30/2023 Not Detected  Not Detected Final   Admission on 10/10/2023, Discharged on 10/10/2023   Component Date Value Ref Range Status     Glucose 10/10/2023 87  65 - 99 mg/dL Final    BUN 10/10/2023 9  6 - 20 mg/dL Final    Creatinine 10/10/2023 0.62  0.57 - 1.00 mg/dL Final    Sodium 10/10/2023 139  136 - 145 mmol/L Final    Potassium 10/10/2023 4.2  3.5 - 5.2 mmol/L Final    Chloride 10/10/2023 104  98 - 107 mmol/L Final    CO2 10/10/2023 27.3  22.0 - 29.0 mmol/L Final    Calcium 10/10/2023 9.5  8.6 - 10.5 mg/dL Final    Total Protein 10/10/2023 7.1  6.0 - 8.5 g/dL Final    Albumin 10/10/2023 4.4  3.5 - 5.2 g/dL Final    ALT (SGPT) 10/10/2023 36 (H)  1 - 33 U/L Final    AST (SGOT) 10/10/2023 32  1 - 32 U/L Final    Alkaline Phosphatase 10/10/2023 67  39 - 117 U/L Final    Total Bilirubin 10/10/2023 0.4  0.0 - 1.2 mg/dL Final    Globulin 10/10/2023 2.7  gm/dL Final    A/G Ratio 10/10/2023 1.6  g/dL Final    BUN/Creatinine Ratio 10/10/2023 14.5  7.0 - 25.0 Final    Anion Gap 10/10/2023 7.7  5.0 - 15.0 mmol/L Final    eGFR 10/10/2023 110.0  >60.0 mL/min/1.73 Final    Color, UA 10/10/2023 Red (A)  Yellow, Straw Final    Dipstick results may be inaccurate due to color interference.        Appearance, UA 10/10/2023 Clear  Clear Final    pH, UA 10/10/2023 7.5  5.0 - 8.0 Final    Specific Gravity, UA 10/10/2023 1.006  1.005 - 1.030 Final    Glucose, UA 10/10/2023 Negative  Negative Final    Ketones, UA 10/10/2023 Negative  Negative Final    Bilirubin, UA 10/10/2023 Negative  Negative Final    Blood, UA 10/10/2023 Large (3+) (A)  Negative Final    Protein, UA 10/10/2023 100 mg/dL (2+) (A)  Negative Final    Leuk Esterase, UA 10/10/2023 Trace (A)  Negative Final    Nitrite, UA 10/10/2023 Negative  Negative Final    Urobilinogen, UA 10/10/2023 0.2 E.U./dL  0.2 - 1.0 E.U./dL Final    Valproic Acid 10/10/2023 <2.8 (L)  50.0 - 125.0 mcg/mL Final    WBC 10/10/2023 5.46  3.40 - 10.80 10*3/mm3 Final    RBC 10/10/2023 5.11  3.77 - 5.28 10*6/mm3 Final    Hemoglobin 10/10/2023 15.8  12.0 - 15.9 g/dL Final    Hematocrit 10/10/2023 49.0 (H)  34.0 - 46.6 % Final    MCV  10/10/2023 95.9  79.0 - 97.0 fL Final    MCH 10/10/2023 30.9  26.6 - 33.0 pg Final    MCHC 10/10/2023 32.2  31.5 - 35.7 g/dL Final    RDW 10/10/2023 13.8  12.3 - 15.4 % Final    RDW-SD 10/10/2023 49.1  37.0 - 54.0 fl Final    MPV 10/10/2023 10.0  6.0 - 12.0 fL Final    Platelets 10/10/2023 147  140 - 450 10*3/mm3 Final    Neutrophil % 10/10/2023 51.4  42.7 - 76.0 % Final    Lymphocyte % 10/10/2023 39.0  19.6 - 45.3 % Final    Monocyte % 10/10/2023 6.8  5.0 - 12.0 % Final    Eosinophil % 10/10/2023 2.4  0.3 - 6.2 % Final    Basophil % 10/10/2023 0.2  0.0 - 1.5 % Final    Immature Grans % 10/10/2023 0.2  0.0 - 0.5 % Final    Neutrophils, Absolute 10/10/2023 2.81  1.70 - 7.00 10*3/mm3 Final    Lymphocytes, Absolute 10/10/2023 2.13  0.70 - 3.10 10*3/mm3 Final    Monocytes, Absolute 10/10/2023 0.37  0.10 - 0.90 10*3/mm3 Final    Eosinophils, Absolute 10/10/2023 0.13  0.00 - 0.40 10*3/mm3 Final    Basophils, Absolute 10/10/2023 0.01  0.00 - 0.20 10*3/mm3 Final    Immature Grans, Absolute 10/10/2023 0.01  0.00 - 0.05 10*3/mm3 Final    nRBC 10/10/2023 0.0  0.0 - 0.2 /100 WBC Final    RBC, UA 10/10/2023 Too Numerous to Count (A)  None Seen, 0-2 /HPF Final    WBC, UA 10/10/2023 3-5 (A)  None Seen, 0-2 /HPF Final    Urine culture not indicated.    Bacteria, UA 10/10/2023 None Seen  None Seen /HPF Final    Squamous Epithelial Cells, UA 10/10/2023 0-2  None Seen, 0-2 /HPF Final    Hyaline Casts, UA 10/10/2023 None Seen  None Seen /LPF Final    Methodology 10/10/2023 Automated Microscopy   Final        Procedure:   Urethral dilation-after an appropriate informed consent, the patient was brought to the procedure suite.  The urethra was gently anesthetized with 10 mL of 2% viscous Xylocaine jelly.  After an adequate period of topical anesthesia I went ahead and dilated with [place] sounds from 16 to 26 Thai sequentially without complication. The patient was given gentamicin as prophylaxis with 80 mg.    Assessment/Plan:    Urethral stricture-posttraumatic  Narcotic pain medication-patient has significant acute pain that I believe would be an indication for the use of narcotic pain medication.  I discussed the significant risks of pain medication and the fact that this will be a short only option and I will give her no more than a three-day supply of pain medication, I will not plan long-term medication, and that this will be sent to a pain clinic if it at all becomes necessary.  We discussed signing a pain medication agreement and the fact that we're going to run a state [place] review to be sure the patient is not getting pain medication from elsewhere.  If this is the case, we will not give pain medication as part of the patient's treatment plan of there being prescribed a controlled substance with potential for abuse.  This patient has been well aware of the appropriate dose of such medications including the risks for somnolence, limited ability to drive and/or safety and the significant potential for overdose.  It has been made clear that these medications are for the prescribed patient only without concomitant use of alcohol or other substance unless prescribed by the medical provider.  Has completed prescribing agreement detailing the terms of continue prescribing him a controlled substance including monitoring Luis Alberto reports, the possibility of urine drug screens, and pill counts.  The patient is aware that we review LUIS ALBERTO reports on a regular basis and scan them into the chart.  History and physical examination exhibited continued safe and appropriate use of controlled substances. We also discussed the fact that the new [place] legislation allows only a three-day prescription for pain medication.  In this situation he will be referred to a chronic pain clinic.        This document has been electronically signed by VEREINCE FINK MD January 9, 2024 10:10 EST    Dictated Utilizing Dragon Dictation: Part of this note may  be an electronic transcription/translation of spoken language to printed text using the Dragon Dictation System.

## 2024-01-20 ENCOUNTER — HOSPITAL ENCOUNTER (EMERGENCY)
Facility: HOSPITAL | Age: 49
Discharge: HOME | End: 2024-01-20
Attending: EMERGENCY MEDICINE
Payer: COMMERCIAL

## 2024-01-20 VITALS
RESPIRATION RATE: 18 BRPM | BODY MASS INDEX: 16.52 KG/M2 | TEMPERATURE: 98.5 F | WEIGHT: 109 LBS | SYSTOLIC BLOOD PRESSURE: 114 MMHG | OXYGEN SATURATION: 99 % | DIASTOLIC BLOOD PRESSURE: 74 MMHG | HEART RATE: 85 BPM | HEIGHT: 68 IN

## 2024-01-20 DIAGNOSIS — G43.909 MIGRAINE WITHOUT STATUS MIGRAINOSUS, NOT INTRACTABLE, UNSPECIFIED MIGRAINE TYPE: Primary | ICD-10-CM

## 2024-01-20 PROCEDURE — 25010000002 DIPHENHYDRAMINE PER 50 MG: Performed by: EMERGENCY MEDICINE

## 2024-01-20 PROCEDURE — 99283 EMERGENCY DEPT VISIT LOW MDM: CPT

## 2024-01-20 PROCEDURE — 25010000002 MORPHINE PER 10 MG: Performed by: EMERGENCY MEDICINE

## 2024-01-20 PROCEDURE — 96375 TX/PRO/DX INJ NEW DRUG ADDON: CPT

## 2024-01-20 PROCEDURE — 25010000002 ONDANSETRON PER 1 MG: Performed by: EMERGENCY MEDICINE

## 2024-01-20 PROCEDURE — 25010000002 DEXAMETHASONE SODIUM PHOSPHATE 10 MG/ML SOLUTION: Performed by: EMERGENCY MEDICINE

## 2024-01-20 PROCEDURE — 96376 TX/PRO/DX INJ SAME DRUG ADON: CPT

## 2024-01-20 PROCEDURE — 25810000003 SODIUM CHLORIDE 0.9 % SOLUTION: Performed by: EMERGENCY MEDICINE

## 2024-01-20 PROCEDURE — 25010000002 DIPHENHYDRAMINE PER 50 MG: Performed by: PHYSICIAN ASSISTANT

## 2024-01-20 PROCEDURE — 96374 THER/PROPH/DIAG INJ IV PUSH: CPT

## 2024-01-20 RX ORDER — DEXAMETHASONE SODIUM PHOSPHATE 10 MG/ML
10 INJECTION, SOLUTION INTRAMUSCULAR; INTRAVENOUS ONCE
Status: COMPLETED | OUTPATIENT
Start: 2024-01-20 | End: 2024-01-20

## 2024-01-20 RX ORDER — ONDANSETRON 2 MG/ML
4 INJECTION INTRAMUSCULAR; INTRAVENOUS ONCE
Status: COMPLETED | OUTPATIENT
Start: 2024-01-20 | End: 2024-01-20

## 2024-01-20 RX ORDER — DIPHENHYDRAMINE HYDROCHLORIDE 50 MG/ML
25 INJECTION INTRAMUSCULAR; INTRAVENOUS ONCE
Status: COMPLETED | OUTPATIENT
Start: 2024-01-20 | End: 2024-01-20

## 2024-01-20 RX ORDER — MORPHINE SULFATE 2 MG/ML
2 INJECTION, SOLUTION INTRAMUSCULAR; INTRAVENOUS ONCE
Status: COMPLETED | OUTPATIENT
Start: 2024-01-20 | End: 2024-01-20

## 2024-01-20 RX ADMIN — DIPHENHYDRAMINE HYDROCHLORIDE 25 MG: 50 INJECTION, SOLUTION INTRAMUSCULAR; INTRAVENOUS at 12:39

## 2024-01-20 RX ADMIN — DEXAMETHASONE SODIUM PHOSPHATE 10 MG: 10 INJECTION INTRAMUSCULAR; INTRAVENOUS at 11:47

## 2024-01-20 RX ADMIN — SODIUM CHLORIDE 1000 ML: 9 INJECTION, SOLUTION INTRAVENOUS at 11:48

## 2024-01-20 RX ADMIN — MORPHINE SULFATE 2 MG: 2 INJECTION, SOLUTION INTRAMUSCULAR; INTRAVENOUS at 11:47

## 2024-01-20 RX ADMIN — MORPHINE SULFATE 2 MG: 2 INJECTION, SOLUTION INTRAMUSCULAR; INTRAVENOUS at 12:39

## 2024-01-20 RX ADMIN — DIPHENHYDRAMINE HYDROCHLORIDE 25 MG: 50 INJECTION, SOLUTION INTRAMUSCULAR; INTRAVENOUS at 11:47

## 2024-01-20 RX ADMIN — ONDANSETRON HYDROCHLORIDE 4 MG: 2 SOLUTION INTRAMUSCULAR; INTRAVENOUS at 11:48

## 2024-01-20 NOTE — ED NOTES
Port accessed at this time with 20G and 1inch needle using sterile technique per [employer] policy. Blood return noted and line has been flushed and infusing with NS IV fluids. Sterile dressing and IV cap applied. Pt tolerated well at this time. KENNEDI

## 2024-01-20 NOTE — ED PROVIDER NOTES
Subjective   History of Present Illness  48-year-old female with past medical history of PTSD, migraines, meningioma, fibromyalgia, cervical degenerative disc disease, bipolar disorder, and Buerger's disease presents to the emergency room with a migraine headache which she states she has had for the past 4 days.  Patient states that she recently had influenza and is just getting over and cannot seem to kick the migraine.  She states this seems consistent with previous migraines which she has had in the past.  She denies any visual changes.  She does report nausea and vomiting.  Migraine is aggravated by light and loud noise.  Denies any alleviating factors despite rest.  Denies any other complaints or concerns at this time.    History provided by:  Patient   used: No        Review of Systems   Constitutional: Negative.  Negative for fever.   Respiratory: Negative.     Cardiovascular: Negative.  Negative for chest pain.   Gastrointestinal:  Positive for nausea and vomiting. Negative for abdominal pain.   Endocrine: Negative.    Genitourinary: Negative.  Negative for dysuria.   Skin: Negative.    Neurological:  Positive for headaches.   Psychiatric/Behavioral: Negative.     All other systems reviewed and are negative.      Past Medical History:   Diagnosis Date    Hoyos's disease     Bipolar 1 disorder     Constipation     DDD (degenerative disc disease), cervical 05/29/2017    Fibromyalgia     IBS (irritable bowel syndrome)     Meningioma     Migraine     PONV (postoperative nausea and vomiting)     PTSD (post-traumatic stress disorder)     Rectal bleeding        Allergies   Allergen Reactions    Ativan [Lorazepam] Hallucinations     confusion    Sulfa Antibiotics Shortness Of Breath and Swelling    Sulfa Antibiotics Anaphylaxis    Reglan [Metoclopramide] Angioedema    Compazine [Prochlorperazine Edisylate] Hives    Demerol [Meperidine] Hives    Droperidol Itching    Metoclopramide Swelling     Toradol [Ketorolac Tromethamine] Hives and Itching    Toradol [Ketorolac Tromethamine] Hives    Zosyn [Piperacillin Sod-Tazobactam So] Hives       Past Surgical History:   Procedure Laterality Date    ANAL SCOPE N/A 2016    Procedure: ANAL SCOPE;  Surgeon: Kael Lopez MD;  Location: [facility] OR;  Service:     APPENDECTOMY      COLONOSCOPY N/A 2016    Procedure: COLONOSCOPY  CPTCODE:88131;  Surgeon: Jose Antonio Belle III, MD;  Location: [facility] OR;  Service:     COLONOSCOPY N/A 2016    Procedure: COLONOSCOPY (30768) CPT;  Surgeon: Jose Antonio Belle III, MD;  Location: [facility] OR;  Service:     CYSTOSCOPY RETROGRADE PYELOGRAM Bilateral 2017    Procedure: CYSTOSCOPY RETROGRADE PYELOGRAM;  Surgeon: Mu Blunt MD;  Location: [facility] OR;  Service:     ENDOSCOPY N/A 2016    Procedure: ESOPHAGOGASTRODUODENOSCOPY WITH BIOPSY  CPTCODE:66362;  Surgeon: Jose Antonio Belle III, MD;  Location: [facility] OR;  Service:     HEMORRHOIDECTOMY N/A 2016    Procedure: HEMORRHOID STAPLING;  Surgeon: Kael Lopez MD;  Location: [facility] OR;  Service:     HYSTERECTOMY      KNEE SURGERY      LAPAROSCOPIC SALPINGOOPHERECTOMY      PORTACATH PLACEMENT N/A 2017    Procedure: INSERTION OF PORTACATH;  Surgeon: Celso Arredondo MD;  Location: [facility] OR;  Service:     SHOULDER SURGERY      3 times       Family History   Problem Relation Age of Onset    Crohn's disease Other     Hypertension Other     Diabetes Other     Irritable bowel syndrome Other     No Known Problems Father     No Known Problems Mother        Social History     Socioeconomic History    Marital status:    Tobacco Use    Smoking status: Former     Packs/day: 1.00     Years: 20.00     Additional pack years: 0.00     Total pack years: 20.00     Types: Cigarettes     Quit date: 2015     Years since quittin.2     Passive exposure: Past    Smokeless tobacco: Never   Vaping Use    Vaping Use: Never used   Substance and  Sexual Activity    Alcohol use: No    Drug use: Yes     Types: Marijuana     Comment: for pain    Sexual activity: Defer     Birth control/protection: Surgical           Objective   Physical Exam  Vitals and nursing note reviewed.   Constitutional:       General: She is not in acute distress.     Appearance: She is well-developed. She is not diaphoretic.   HENT:      Head: Normocephalic and atraumatic.      Right Ear: External ear normal.      Left Ear: External ear normal.      Nose: Nose normal.   Eyes:      Conjunctiva/sclera: Conjunctivae normal.      Pupils: Pupils are equal, round, and reactive to light.   Neck:      Vascular: No JVD.      Trachea: No tracheal deviation.   Cardiovascular:      Rate and Rhythm: Normal rate and regular rhythm.      Heart sounds: Normal heart sounds. No murmur heard.  Pulmonary:      Effort: Pulmonary effort is normal. No respiratory distress.      Breath sounds: Normal breath sounds. No wheezing.   Abdominal:      General: Bowel sounds are normal.      Palpations: Abdomen is soft.      Tenderness: There is no abdominal tenderness.   Musculoskeletal:         General: No deformity. Normal range of motion.      Cervical back: Normal range of motion and neck supple. No rigidity. No pain with movement.   Skin:     General: Skin is warm and dry.      Coloration: Skin is not pale.      Findings: No erythema or rash.   Neurological:      General: No focal deficit present.      Mental Status: She is alert and oriented to person, place, and time.      Cranial Nerves: No cranial nerve deficit.      Gait: Gait is intact.   Psychiatric:         Behavior: Behavior normal.         Thought Content: Thought content normal.         Procedures           ED Course                                             Medical Decision Making  48-year-old female with past medical history of PTSD, migraines, meningioma, fibromyalgia, cervical degenerative disc disease, bipolar disorder, and Buerger's disease  presents to the emergency room with a migraine headache which she states she has had for the past 4 days.  Patient states that she recently had influenza and is just getting over and cannot seem to kick the migraine.  She states this seems consistent with previous migraines which she has had in the past.  She denies any visual changes.  She does report nausea and vomiting.  Migraine is aggravated by light and loud noise.  Denies any alleviating factors despite rest.  Denies any other complaints or concerns at this time.      Problems Addressed:  Migraine without status migrainosus, not intractable, unspecified migraine type: complicated acute illness or injury    Risk  Prescription drug management.        Final diagnoses:   Migraine without status migrainosus, not intractable, unspecified migraine type       ED Disposition  ED Disposition       ED Disposition   Discharge    Condition   Stable    Comment   --               Mabel Patterson, APRN  [address] [address]  [phone]    In 2 days           Medication List      No changes were made to your prescriptions during this visit.            Han Varghese PA-C  01/20/24 1245       Han Varghese PA-C  01/20/24 1252

## 2024-02-06 ENCOUNTER — OFFICE VISIT (OUTPATIENT)
Dept: UROLOGY | Facility: CLINIC | Age: 49
End: 2024-02-06
Payer: COMMERCIAL

## 2024-02-06 VITALS
HEIGHT: 68 IN | BODY MASS INDEX: 16.37 KG/M2 | WEIGHT: 108 LBS | SYSTOLIC BLOOD PRESSURE: 136 MMHG | DIASTOLIC BLOOD PRESSURE: 104 MMHG | HEART RATE: 76 BPM

## 2024-02-06 DIAGNOSIS — Z48.816 AFTERCARE FOLLOWING SURGERY OF THE GENITOURINARY SYSTEM: Primary | ICD-10-CM

## 2024-02-06 DIAGNOSIS — N23 RENAL COLIC: ICD-10-CM

## 2024-02-06 DIAGNOSIS — N35.028 OTHER POST-TRAUMATIC URETHRAL STRICTURE, FEMALE: ICD-10-CM

## 2024-02-06 RX ORDER — OXYCODONE AND ACETAMINOPHEN 10; 325 MG/1; MG/1
1 TABLET ORAL EVERY 6 HOURS PRN
Qty: 20 TABLET | Refills: 0 | Status: SHIPPED | OUTPATIENT
Start: 2024-02-06

## 2024-02-06 RX ORDER — PROMETHAZINE HYDROCHLORIDE 25 MG/1
25 TABLET ORAL EVERY 6 HOURS PRN
Qty: 28 TABLET | Refills: 4 | Status: SHIPPED | OUTPATIENT
Start: 2024-02-06

## 2024-02-06 RX ORDER — GENTAMICIN SULFATE 40 MG/ML
80 INJECTION, SOLUTION INTRAMUSCULAR; INTRAVENOUS ONCE
Status: COMPLETED | OUTPATIENT
Start: 2024-02-06 | End: 2024-02-06

## 2024-02-06 RX ADMIN — GENTAMICIN SULFATE 80 MG: 40 INJECTION, SOLUTION INTRAMUSCULAR; INTRAVENOUS at 08:28

## 2024-02-06 NOTE — PROGRESS NOTES
Chief Complaint:      Chief Complaint   Patient presents with    Dilation        HPI:   49 y.o. female here for urethral dilation    Past Medical History:     Past Medical History:   Diagnosis Date    Hoyos's disease     Bipolar 1 disorder     Constipation     DDD (degenerative disc disease), cervical 05/29/2017    Fibromyalgia     IBS (irritable bowel syndrome)     Meningioma     Migraine     PONV (postoperative nausea and vomiting)     PTSD (post-traumatic stress disorder)     Rectal bleeding        Current Meds:     Current Outpatient Medications   Medication Sig Dispense Refill    albuterol (PROVENTIL HFA;VENTOLIN HFA) 108 (90 Base) MCG/ACT inhaler Inhale 2 puffs 2 (Two) Times a Day.      Azelastine HCl 137 MCG/SPRAY solution 1 spray into the nostril(s) as directed by provider As Needed (Allergies).      busPIRone (BUSPAR) 15 MG tablet Take 1 tablet by mouth 3 (Three) Times a Day.      diclofenac (VOLTAREN) 1 % gel gel       divalproex (DEPAKOTE) 500 MG DR tablet Take 1 tablet by mouth 3 (Three) Times a Day. 90 tablet 2    docusate sodium (COLACE) 100 MG capsule Take 1 capsule by mouth 2 (Two) Times a Day. 20 capsule 0    docusate sodium (COLACE) 100 MG capsule Take 1 capsule by mouth 2 (Two) Times a Day As Needed for Constipation. 60 capsule 0    fluticasone (VERAMYST) 27.5 MCG/SPRAY nasal spray 2 sprays into the nostril(s) as directed by provider Daily.      megestrol (MEGACE) 20 MG tablet Take 1 tablet by mouth Daily. 30 tablet 3    methylPREDNISolone (MEDROL) 4 MG dose pack Take as directed on package instructions. 21 tablet 0    metroNIDAZOLE (FLAGYL) 500 MG tablet Take 1 tablet by mouth 3 (Three) Times a Day. 30 tablet 0    montelukast (SINGULAIR) 10 MG tablet Take 1 tablet by mouth Every Night.      nitrofurantoin, macrocrystal-monohydrate, (MACROBID) 100 MG capsule Take 1 capsule by mouth 2 (Two) Times a Day. 13 capsule 0    ondansetron ODT (ZOFRAN-ODT) 4 MG disintegrating tablet Place 2 tablets on the  tongue Every 8 (Eight) Hours As Needed for Nausea or Vomiting. 30 tablet 0    oxyCODONE-acetaminophen (Percocet)  MG per tablet Take 1 tablet by mouth Every 6 (Six) Hours As Needed for Moderate Pain. 20 tablet 0    oxyCODONE-acetaminophen (Percocet)  MG per tablet Take 1 tablet by mouth Every 6 (Six) Hours As Needed for Moderate Pain. 16 tablet 0    oxyCODONE-acetaminophen (PERCOCET) 5-325 MG per tablet Take 1 tablet by mouth Every 8 (Eight) Hours As Needed for Moderate Pain. 12 tablet 0    oxyCODONE-acetaminophen (PERCOCET) 5-325 MG per tablet Take 1 tablet by mouth Every 6 (Six) Hours As Needed for Moderate Pain. 2 tablet 0    pantoprazole (PROTONIX) 40 MG EC tablet Take 1 tablet by mouth 2 (Two) Times a Day As Needed.      phenazopyridine (PYRIDIUM) 100 MG tablet Take 1 tablet by mouth 3 (Three) Times a Day As Needed for Bladder Spasms. 30 tablet 1    polyethylene glycol (MIRALAX) 17 GM/SCOOP powder Take 17 g by mouth Daily. 225 g 0    promethazine (PHENERGAN) 25 MG tablet Take 1 tablet by mouth Every 6 (Six) Hours As Needed for Nausea or Vomiting. 28 tablet 4    promethazine (PHENERGAN) 25 MG tablet Take 1 tablet by mouth Every 6 (Six) Hours As Needed for Nausea or Vomiting. 21 tablet 5    sertraline (ZOLOFT) 100 MG tablet Take 1 tablet by mouth 2 (Two) Times a Day.      SUMAtriptan (IMITREX) 6 MG/0.5ML injection Inject prescribed dose at onset of headache. May repeat dose one time in 1 hour(s) if headache not relieved.      terazosin (HYTRIN) 2 MG capsule Take 1 capsule by mouth Every Night for 14 days. 14 capsule 0    traMADol (ULTRAM) 50 MG tablet        No current facility-administered medications for this visit.        Allergies:      Allergies   Allergen Reactions    Ativan [Lorazepam] Hallucinations     confusion    Sulfa Antibiotics Shortness Of Breath and Swelling    Sulfa Antibiotics Anaphylaxis    Reglan [Metoclopramide] Angioedema    Compazine [Prochlorperazine Edisylate] Hives    Demerol  [Meperidine] Hives    Droperidol Itching    Metoclopramide Swelling    Toradol [Ketorolac Tromethamine] Hives and Itching    Toradol [Ketorolac Tromethamine] Hives    Zosyn [Piperacillin Sod-Tazobactam So] Hives        Past Surgical History:     Past Surgical History:   Procedure Laterality Date    ANAL SCOPE N/A 2016    Procedure: ANAL SCOPE;  Surgeon: Kael Lopez MD;  Location: [facility] OR;  Service:     APPENDECTOMY      COLONOSCOPY N/A 2016    Procedure: COLONOSCOPY  CPTCODE:48527;  Surgeon: Jose Antonio Belle III, MD;  Location: [facility] OR;  Service:     COLONOSCOPY N/A 2016    Procedure: COLONOSCOPY (96445) CPT;  Surgeon: Jose Antonio Belle III, MD;  Location: [facility] OR;  Service:     CYSTOSCOPY RETROGRADE PYELOGRAM Bilateral 2017    Procedure: CYSTOSCOPY RETROGRADE PYELOGRAM;  Surgeon: Mu Blunt MD;  Location: [facility] OR;  Service:     ENDOSCOPY N/A 2016    Procedure: ESOPHAGOGASTRODUODENOSCOPY WITH BIOPSY  CPTCODE:16063;  Surgeon: Jose Antonio Belle III, MD;  Location: [facility] OR;  Service:     HEMORRHOIDECTOMY N/A 2016    Procedure: HEMORRHOID STAPLING;  Surgeon: Kael Lopez MD;  Location: [facility] OR;  Service:     HYSTERECTOMY      KNEE SURGERY      LAPAROSCOPIC SALPINGOOPHERECTOMY      PORTACATH PLACEMENT N/A 2017    Procedure: INSERTION OF PORTACATH;  Surgeon: Celso Arredondo MD;  Location: [facility] OR;  Service:     SHOULDER SURGERY      3 times       Social History:     Social History     Socioeconomic History    Marital status:    Tobacco Use    Smoking status: Former     Packs/day: 1.00     Years: 20.00     Additional pack years: 0.00     Total pack years: 20.00     Types: Cigarettes     Quit date: 2015     Years since quittin.2     Passive exposure: Past    Smokeless tobacco: Never   Vaping Use    Vaping Use: Never used   Substance and Sexual Activity    Alcohol use: No    Drug use: Yes     Types: Marijuana     Comment: for pain     Sexual activity: Defer     Birth control/protection: Surgical       Family History:     Family History   Problem Relation Age of Onset    Crohn's disease Other     Hypertension Other     Diabetes Other     Irritable bowel syndrome Other     No Known Problems Father     No Known Problems Mother        Review of Systems:     Review of Systems   Constitutional: Negative.  Negative for activity change, appetite change, chills, diaphoresis, fatigue and unexpected weight change.   HENT:  Negative for congestion, dental problem, drooling, ear discharge, ear pain, facial swelling, hearing loss, mouth sores, nosebleeds, postnasal drip, rhinorrhea, sinus pressure, sneezing, sore throat, tinnitus, trouble swallowing and voice change.    Eyes: Negative.  Negative for photophobia, pain, discharge, redness, itching and visual disturbance.   Respiratory: Negative.  Negative for apnea, cough, choking, chest tightness, shortness of breath, wheezing and stridor.    Cardiovascular: Negative.  Negative for chest pain, palpitations and leg swelling.   Gastrointestinal: Negative.  Negative for abdominal distention, abdominal pain, anal bleeding, blood in stool, constipation, diarrhea, nausea, rectal pain and vomiting.   Endocrine: Negative.  Negative for cold intolerance, heat intolerance, polydipsia, polyphagia and polyuria.   Musculoskeletal: Negative.  Negative for arthralgias, back pain, gait problem, joint swelling, myalgias, neck pain and neck stiffness.   Skin: Negative.  Negative for color change, pallor, rash and wound.   Allergic/Immunologic: Negative.  Negative for environmental allergies, food allergies and immunocompromised state.   Neurological: Negative.  Negative for dizziness, tremors, seizures, syncope, facial asymmetry, speech difficulty, weakness, light-headedness, numbness and headaches.   Hematological: Negative.  Negative for adenopathy. Does not bruise/bleed easily.   Psychiatric/Behavioral:  Negative for  agitation, behavioral problems, confusion, decreased concentration, dysphoric mood, hallucinations, self-injury, sleep disturbance and suicidal ideas. The patient is not nervous/anxious and is not hyperactive.    All other systems reviewed and are negative.      Physical Exam:     Physical Exam  Constitutional:       Appearance: She is well-developed.   HENT:      Head: Normocephalic and atraumatic.      Right Ear: External ear normal.      Left Ear: External ear normal.   Eyes:      Conjunctiva/sclera: Conjunctivae normal.      Pupils: Pupils are equal, round, and reactive to light.   Cardiovascular:      Rate and Rhythm: Normal rate and regular rhythm.      Heart sounds: Normal heart sounds.   Pulmonary:      Effort: Pulmonary effort is normal.      Breath sounds: Normal breath sounds.   Abdominal:      General: Bowel sounds are normal. There is no distension.      Palpations: Abdomen is soft. There is no mass.      Tenderness: There is no abdominal tenderness. There is no guarding or rebound.   Genitourinary:     General: Normal vulva.      Vagina: No vaginal discharge.   Musculoskeletal:         General: Normal range of motion.   Skin:     General: Skin is warm and dry.   Neurological:      Mental Status: She is alert.      Deep Tendon Reflexes: Reflexes are normal and symmetric.   Psychiatric:         Behavior: Behavior normal.         Thought Content: Thought content normal.         Judgment: Judgment normal.         I have reviewed the following portions of the patient's history: Allergies, current medications, past family history, past medical history, past social history, past surgical history, problem list, and ROS and confirm it is accurate.    Recent Image (CT and/or KUB):      CT Abdomen and Pelvis: No results found for this or any previous visit.       CT Stone Protocol: Results for orders placed during the hospital encounter of 10/10/23    CT Abdomen Pelvis Stone Protocol    Narrative  CT ABDOMEN  PELVIS STONE PROTOCOL-: 10/10/2023 9:19 PM    REASON FOR EXAM: Flank pain, kidney stone suspected LT FLANK PAIN.    TECHNIQUE: Noncontrast 4 mm axial sections with sagittal and coronal  reformats.    COMPARISON: 3/16/2023.    FINDINGS: There is focal bronchiectasis within the right lower lobe  (image 4/59), now with probable mucous plugging.  Noting the noncontrast  technique, the liver is unremarkable.  No focal liver lesions are  identified.  Best demonstrated on narrow windows, there is the  suggestion of cholelithiasis dependently within the gallbladder.  There  is no biliary ductal dilatation.  The spleen is at the upper limits of  normal in size.  The pancreas, adrenal glands and kidneys are within  normal limits, noting a probable cyst within the lower pole of the right  kidney (image 3/37).  There is no nephrolithiasis.  There is no abnormal  dilatation of either renal collecting system or ureter.  There is no  ureterolithiasis.  The abdominal aorta is normal in caliber, noting mild  vascular calcification.  There is a surgical anastomosis at the  rectoanal junction.  No complication is evident.  There are no findings  of bowel obstruction.  Noting the lack of enteric contrast material,  there is no abnormal bowel wall thickening.  The appendix is not  identified, however there are no findings of appendicitis.  There is no  free intraperitoneal fluid.  The uterus is absent.  The ovaries are not  identified.  There are no abnormal adnexal masses.  There is no lymph  node enlargement.  The bladder is unremarkable.  There is an old  fracture of L2.    Impression  1.  No acute abnormality of the abdomen or pelvis.  There is no  nephrolithiasis or ureterolithiasis.  2.  Incidental findings, as above, similar in appearance to 3/16/2023.      This report was finalized on 10/10/2023 11:05 PM by Steph Fabian MD.       KUB: Results for orders placed during the hospital encounter of 01/10/23    XR Abdomen  KUB    Narrative  EXAM:  XR Abdomen, 1 View    EXAM DATE:  1/10/2023 12:05 PM    CLINICAL HISTORY:  flank pain    TECHNIQUE:  Frontal supine view of the abdomen/pelvis.    COMPARISON:  10/01/2022    FINDINGS:  Gastrointestinal tract:  Unremarkable.  No dilation.  Organs:  No stones identified along the expected course of ureters.  Bones/joints:  Unremarkable.  Soft tissues:  Surgical staples in the lower pelvis.  Vasculature:  Stable phleboliths lower pelvis.    Impression  1.  No acute findings radiographically evident.  2.  No radiographic evidence of renal or ureteral stones.    This report was finalized on 1/10/2023 12:47 PM by Dr. Ankit Naik MD.       Labs (past 3 months):      Admission on 01/06/2024, Discharged on 01/06/2024   Component Date Value Ref Range Status    Glucose 01/06/2024 93  65 - 99 mg/dL Final    BUN 01/06/2024 8  6 - 20 mg/dL Final    Creatinine 01/06/2024 0.67  0.57 - 1.00 mg/dL Final    Sodium 01/06/2024 140  136 - 145 mmol/L Final    Potassium 01/06/2024 4.2  3.5 - 5.2 mmol/L Final    Slight hemolysis detected by analyzer. Result may be falsely elevated.    Chloride 01/06/2024 105  98 - 107 mmol/L Final    CO2 01/06/2024 25.0  22.0 - 29.0 mmol/L Final    Calcium 01/06/2024 9.3  8.6 - 10.5 mg/dL Final    Total Protein 01/06/2024 6.2  6.0 - 8.5 g/dL Final    Albumin 01/06/2024 3.6  3.5 - 5.2 g/dL Final    ALT (SGPT) 01/06/2024 38 (H)  1 - 33 U/L Final    AST (SGOT) 01/06/2024 32  1 - 32 U/L Final    Alkaline Phosphatase 01/06/2024 75  39 - 117 U/L Final    Total Bilirubin 01/06/2024 0.3  0.0 - 1.2 mg/dL Final    Globulin 01/06/2024 2.6  gm/dL Final    A/G Ratio 01/06/2024 1.4  g/dL Final    BUN/Creatinine Ratio 01/06/2024 11.9  7.0 - 25.0 Final    Anion Gap 01/06/2024 10.0  5.0 - 15.0 mmol/L Final    eGFR 01/06/2024 108.0  >60.0 mL/min/1.73 Final    Magnesium 01/06/2024 1.8  1.6 - 2.6 mg/dL Final    WBC 01/06/2024 5.57  3.40 - 10.80 10*3/mm3 Final    RBC 01/06/2024 4.67  3.77 - 5.28  10*6/mm3 Final    Hemoglobin 01/06/2024 14.4  12.0 - 15.9 g/dL Final    Hematocrit 01/06/2024 44.4  34.0 - 46.6 % Final    MCV 01/06/2024 95.1  79.0 - 97.0 fL Final    MCH 01/06/2024 30.8  26.6 - 33.0 pg Final    MCHC 01/06/2024 32.4  31.5 - 35.7 g/dL Final    RDW 01/06/2024 13.2  12.3 - 15.4 % Final    RDW-SD 01/06/2024 46.5  37.0 - 54.0 fl Final    MPV 01/06/2024 10.1  6.0 - 12.0 fL Final    Platelets 01/06/2024 158  140 - 450 10*3/mm3 Final    Neutrophil % 01/06/2024 46.0  42.7 - 76.0 % Final    Lymphocyte % 01/06/2024 44.0  19.6 - 45.3 % Final    Monocyte % 01/06/2024 6.3  5.0 - 12.0 % Final    Eosinophil % 01/06/2024 3.1  0.3 - 6.2 % Final    Basophil % 01/06/2024 0.4  0.0 - 1.5 % Final    Immature Grans % 01/06/2024 0.2  0.0 - 0.5 % Final    Neutrophils, Absolute 01/06/2024 2.57  1.70 - 7.00 10*3/mm3 Final    Lymphocytes, Absolute 01/06/2024 2.45  0.70 - 3.10 10*3/mm3 Final    Monocytes, Absolute 01/06/2024 0.35  0.10 - 0.90 10*3/mm3 Final    Eosinophils, Absolute 01/06/2024 0.17  0.00 - 0.40 10*3/mm3 Final    Basophils, Absolute 01/06/2024 0.02  0.00 - 0.20 10*3/mm3 Final    Immature Grans, Absolute 01/06/2024 0.01  0.00 - 0.05 10*3/mm3 Final    nRBC 01/06/2024 0.0  0.0 - 0.2 /100 WBC Final    Extra Tube 01/06/2024 Hold for add-ons.   Final    Auto resulted.    Extra Tube 01/06/2024 hold for add-on   Final    Auto resulted    Extra Tube 01/06/2024 Hold for add-ons.   Final    Auto resulted.    Extra Tube 01/06/2024 Hold for add-ons.   Final    Auto resulted   Admission on 11/30/2023, Discharged on 11/30/2023   Component Date Value Ref Range Status    COVID19 11/30/2023 Not Detected  Not Detected - Ref. Range Final    Influenza A PCR 11/30/2023 Not Detected  Not Detected Final    Influenza B PCR 11/30/2023 Not Detected  Not Detected Final        Procedure:   Urethral dilation-after an appropriate informed consent, the patient was brought to the procedure suite.  The urethra was gently anesthetized with 10 mL  of 2% viscous Xylocaine jelly.  After an adequate period of topical anesthesia I went ahead and the urethra was gently anesthetized and dilated with [place] sounds from 16 to 30 [place] sequentially without complication. The patient was given gentamicin as prophylaxis with 80 mg.    Assessment/Plan:   Urethral stricture-status post dilation  Narcotic pain medication-patient has significant acute pain that I believe would be an indication for the use of narcotic pain medication.  I discussed the significant risks of pain medication and the fact that this will be a short only option and I will give her no more than a three-day supply of pain medication, I will not plan long-term medication, and that this will be sent to a pain clinic if it at all becomes necessary.  We discussed signing a pain medication agreement and the fact that we're going to run a state [facility] review to be sure the patient is not getting pain medication from elsewhere.  If this is the case, we will not give pain medication as part of the patient's treatment plan of there being prescribed a controlled substance with potential for abuse.  This patient has been well aware of the appropriate dose of such medications including the risks for somnolence, limited ability to drive and/or safety and the significant potential for overdose.  It has been made clear that these medications are for the prescribed patient only without concomitant use of alcohol or other substance unless prescribed by the medical provider.  Has completed prescribing agreement detailing the terms of continue prescribing him a controlled substance including monitoring [facility]to reports, the possibility of urine drug screens, and pill counts.  The patient is aware that we review LUIS ALBERTO reports on a regular basis and scan them into the chart.  History and physical examination exhibited continued safe and appropriate use of controlled substances. We also discussed the fact that the new  [place] legislation allows only a three-day prescription for pain medication.  In this situation he will be referred to a chronic pain clinic.          This document has been electronically signed by VERENICE FINK MD February 6, 2024 08:03 EST    Dictated Utilizing Dragon Dictation: Part of this note may be an electronic transcription/translation of spoken language to printed text using the Dragon Dictation System.

## 2024-03-05 ENCOUNTER — OFFICE VISIT (OUTPATIENT)
Dept: UROLOGY | Facility: CLINIC | Age: 49
End: 2024-03-05
Payer: COMMERCIAL

## 2024-03-05 VITALS
HEIGHT: 68 IN | SYSTOLIC BLOOD PRESSURE: 123 MMHG | HEART RATE: 90 BPM | BODY MASS INDEX: 16.67 KG/M2 | WEIGHT: 110 LBS | DIASTOLIC BLOOD PRESSURE: 82 MMHG

## 2024-03-05 DIAGNOSIS — N35.028 OTHER POST-TRAUMATIC URETHRAL STRICTURE, FEMALE: ICD-10-CM

## 2024-03-05 DIAGNOSIS — N23 RENAL COLIC: ICD-10-CM

## 2024-03-05 DIAGNOSIS — Z48.816 AFTERCARE FOLLOWING SURGERY OF THE GENITOURINARY SYSTEM: Primary | ICD-10-CM

## 2024-03-05 DIAGNOSIS — R35.0 FREQUENCY OF MICTURITION: ICD-10-CM

## 2024-03-05 RX ORDER — PROMETHAZINE HYDROCHLORIDE 25 MG/1
25 TABLET ORAL EVERY 6 HOURS PRN
Qty: 21 TABLET | Refills: 5 | Status: SHIPPED | OUTPATIENT
Start: 2024-03-05

## 2024-03-05 RX ORDER — OXYCODONE AND ACETAMINOPHEN 10; 325 MG/1; MG/1
1 TABLET ORAL EVERY 6 HOURS PRN
Qty: 20 TABLET | Refills: 0 | Status: SHIPPED | OUTPATIENT
Start: 2024-03-05

## 2024-03-05 RX ORDER — GENTAMICIN SULFATE 40 MG/ML
80 INJECTION, SOLUTION INTRAMUSCULAR; INTRAVENOUS ONCE
Status: COMPLETED | OUTPATIENT
Start: 2024-03-05 | End: 2024-03-05

## 2024-03-05 RX ADMIN — GENTAMICIN SULFATE 80 MG: 40 INJECTION, SOLUTION INTRAMUSCULAR; INTRAVENOUS at 10:32

## 2024-03-05 NOTE — PROGRESS NOTES
Chief Complaint:      Chief Complaint   Patient presents with    Urethral Stricture      Follow up /dilation        HPI:   49 y.o. female here with urethral obstruction for dilation    Past Medical History:     Past Medical History:   Diagnosis Date    Hoyos's disease     Bipolar 1 disorder     Constipation     DDD (degenerative disc disease), cervical 05/29/2017    Fibromyalgia     IBS (irritable bowel syndrome)     Meningioma     Migraine     PONV (postoperative nausea and vomiting)     PTSD (post-traumatic stress disorder)     Rectal bleeding        Current Meds:     Current Outpatient Medications   Medication Sig Dispense Refill    albuterol (PROVENTIL HFA;VENTOLIN HFA) 108 (90 Base) MCG/ACT inhaler Inhale 2 puffs 2 (Two) Times a Day.      Azelastine HCl 137 MCG/SPRAY solution 1 spray into the nostril(s) as directed by provider As Needed (Allergies).      busPIRone (BUSPAR) 15 MG tablet Take 1 tablet by mouth 3 (Three) Times a Day.      diclofenac (VOLTAREN) 1 % gel gel       divalproex (DEPAKOTE) 500 MG DR tablet Take 1 tablet by mouth 3 (Three) Times a Day. 90 tablet 2    docusate sodium (COLACE) 100 MG capsule Take 1 capsule by mouth 2 (Two) Times a Day. 20 capsule 0    fluticasone (VERAMYST) 27.5 MCG/SPRAY nasal spray 2 sprays into the nostril(s) as directed by provider Daily.      megestrol (MEGACE) 20 MG tablet Take 1 tablet by mouth Daily. 30 tablet 3    methylPREDNISolone (MEDROL) 4 MG dose pack Take as directed on package instructions. 21 tablet 0    metroNIDAZOLE (FLAGYL) 500 MG tablet Take 1 tablet by mouth 3 (Three) Times a Day. 30 tablet 0    montelukast (SINGULAIR) 10 MG tablet Take 1 tablet by mouth Every Night.      nitrofurantoin, macrocrystal-monohydrate, (MACROBID) 100 MG capsule Take 1 capsule by mouth 2 (Two) Times a Day. 13 capsule 0    ondansetron ODT (ZOFRAN-ODT) 4 MG disintegrating tablet Place 2 tablets on the tongue Every 8 (Eight) Hours As Needed for Nausea or Vomiting. 30 tablet 0     oxyCODONE-acetaminophen (Percocet)  MG per tablet Take 1 tablet by mouth Every 6 (Six) Hours As Needed for Moderate Pain. 20 tablet 0    pantoprazole (PROTONIX) 40 MG EC tablet Take 1 tablet by mouth 2 (Two) Times a Day As Needed.      phenazopyridine (PYRIDIUM) 100 MG tablet Take 1 tablet by mouth 3 (Three) Times a Day As Needed for Bladder Spasms. 30 tablet 1    polyethylene glycol (MIRALAX) 17 GM/SCOOP powder Take 17 g by mouth Daily. 225 g 0    promethazine (PHENERGAN) 25 MG tablet Take 1 tablet by mouth Every 6 (Six) Hours As Needed for Nausea or Vomiting. 21 tablet 5    promethazine (PHENERGAN) 25 MG tablet Take 1 tablet by mouth Every 6 (Six) Hours As Needed for Nausea or Vomiting. 28 tablet 4    sertraline (ZOLOFT) 100 MG tablet Take 1 tablet by mouth 2 (Two) Times a Day.      SUMAtriptan (IMITREX) 6 MG/0.5ML injection Inject prescribed dose at onset of headache. May repeat dose one time in 1 hour(s) if headache not relieved.      traMADol (ULTRAM) 50 MG tablet       terazosin (HYTRIN) 2 MG capsule Take 1 capsule by mouth Every Night for 14 days. 14 capsule 0     No current facility-administered medications for this visit.        Allergies:      Allergies   Allergen Reactions    Ativan [Lorazepam] Hallucinations     confusion    Sulfa Antibiotics Shortness Of Breath and Swelling    Sulfa Antibiotics Anaphylaxis    Reglan [Metoclopramide] Angioedema    Compazine [Prochlorperazine Edisylate] Hives    Demerol [Meperidine] Hives    Droperidol Itching    Metoclopramide Swelling    Toradol [Ketorolac Tromethamine] Hives and Itching    Toradol [Ketorolac Tromethamine] Hives    Zosyn [Piperacillin Sod-Tazobactam So] Hives        Past Surgical History:     Past Surgical History:   Procedure Laterality Date    ANAL SCOPE N/A 7/28/2016    Procedure: ANAL SCOPE;  Surgeon: Kael Lopez MD;  Location: [facility];  Service:     APPENDECTOMY      COLONOSCOPY N/A 6/30/2016    Procedure: COLONOSCOPY  CPTCODE:66717;   Surgeon: Jose Antonio Belle III, MD;  Location: [facility] OR;  Service:     COLONOSCOPY N/A 2016    Procedure: COLONOSCOPY (11304) CPT;  Surgeon: Jose Antonio Belle III, MD;  Location: [facility] OR;  Service:     CYSTOSCOPY RETROGRADE PYELOGRAM Bilateral 2017    Procedure: CYSTOSCOPY RETROGRADE PYELOGRAM;  Surgeon: Mu Blunt MD;  Location: [facility] OR;  Service:     ENDOSCOPY N/A 2016    Procedure: ESOPHAGOGASTRODUODENOSCOPY WITH BIOPSY  CPTCODE:89051;  Surgeon: Jose Antonio Belle III, MD;  Location: [facility] OR;  Service:     HEMORRHOIDECTOMY N/A 2016    Procedure: HEMORRHOID STAPLING;  Surgeon: Kael Lopez MD;  Location: [facility] OR;  Service:     HYSTERECTOMY      KNEE SURGERY      LAPAROSCOPIC SALPINGOOPHERECTOMY      PORTACATH PLACEMENT N/A 2017    Procedure: INSERTION OF PORTACATH;  Surgeon: Celso Arredondo MD;  Location: [facility] OR;  Service:     SHOULDER SURGERY      3 times       Social History:     Social History     Socioeconomic History    Marital status:    Tobacco Use    Smoking status: Former     Current packs/day: 0.00     Average packs/day: 1 pack/day for 20.0 years (20.0 ttl pk-yrs)     Types: Cigarettes     Start date: 1995     Quit date: 2015     Years since quittin.3     Passive exposure: Past    Smokeless tobacco: Never   Vaping Use    Vaping status: Never Used   Substance and Sexual Activity    Alcohol use: No    Drug use: Yes     Types: Marijuana     Comment: for pain    Sexual activity: Defer     Birth control/protection: Surgical       Family History:     Family History   Problem Relation Age of Onset    Crohn's disease Other     Hypertension Other     Diabetes Other     Irritable bowel syndrome Other     No Known Problems Father     No Known Problems Mother        Review of Systems:     Review of Systems   Constitutional: Negative.  Negative for activity change, appetite change, chills, diaphoresis, fatigue and unexpected weight  change.   HENT:  Negative for congestion, dental problem, drooling, ear discharge, ear pain, facial swelling, hearing loss, mouth sores, nosebleeds, postnasal drip, rhinorrhea, sinus pressure, sneezing, sore throat, tinnitus, trouble swallowing and voice change.    Eyes: Negative.  Negative for photophobia, pain, discharge, redness, itching and visual disturbance.   Respiratory: Negative.  Negative for apnea, cough, choking, chest tightness, shortness of breath, wheezing and stridor.    Cardiovascular: Negative.  Negative for chest pain, palpitations and leg swelling.   Gastrointestinal: Negative.  Negative for abdominal distention, abdominal pain, anal bleeding, blood in stool, constipation, diarrhea, nausea, rectal pain and vomiting.   Endocrine: Negative.  Negative for cold intolerance, heat intolerance, polydipsia, polyphagia and polyuria.   Genitourinary:  Positive for difficulty urinating.   Musculoskeletal: Negative.  Negative for arthralgias, back pain, gait problem, joint swelling, myalgias, neck pain and neck stiffness.   Skin: Negative.  Negative for color change, pallor, rash and wound.   Allergic/Immunologic: Negative.  Negative for environmental allergies, food allergies and immunocompromised state.   Neurological: Negative.  Negative for dizziness, tremors, seizures, syncope, facial asymmetry, speech difficulty, weakness, light-headedness, numbness and headaches.   Hematological: Negative.  Negative for adenopathy. Does not bruise/bleed easily.   Psychiatric/Behavioral:  Negative for agitation, behavioral problems, confusion, decreased concentration, dysphoric mood, hallucinations, self-injury, sleep disturbance and suicidal ideas. The patient is not nervous/anxious and is not hyperactive.    All other systems reviewed and are negative.      Physical Exam:     Physical Exam  Constitutional:       Appearance: She is well-developed.   HENT:      Head: Normocephalic and atraumatic.      Right Ear:  External ear normal.      Left Ear: External ear normal.   Eyes:      Conjunctiva/sclera: Conjunctivae normal.      Pupils: Pupils are equal, round, and reactive to light.   Cardiovascular:      Rate and Rhythm: Normal rate and regular rhythm.      Heart sounds: Normal heart sounds.   Pulmonary:      Effort: Pulmonary effort is normal.      Breath sounds: Normal breath sounds.   Abdominal:      General: Bowel sounds are normal. There is no distension.      Palpations: Abdomen is soft. There is no mass.      Tenderness: There is no abdominal tenderness. There is no guarding or rebound.   Genitourinary:     General: Normal vulva.      Vagina: No vaginal discharge.   Musculoskeletal:         General: Normal range of motion.   Skin:     General: Skin is warm and dry.   Neurological:      Mental Status: She is alert.      Deep Tendon Reflexes: Reflexes are normal and symmetric.   Psychiatric:         Behavior: Behavior normal.         Thought Content: Thought content normal.         Judgment: Judgment normal.         I have reviewed the following portions of the patient's history: Allergies, current medications, past family history, past medical history, past social history, past surgical history, problem list, and ROS and confirm it is accurate.    Recent Image (CT and/or KUB):      CT Abdomen and Pelvis: No results found for this or any previous visit.       CT Stone Protocol: Results for orders placed during the hospital encounter of 10/10/23    CT Abdomen Pelvis Stone Protocol    Narrative  CT ABDOMEN PELVIS STONE PROTOCOL-: 10/10/2023 9:19 PM    REASON FOR EXAM: Flank pain, kidney stone suspected LT FLANK PAIN.    TECHNIQUE: Noncontrast 4 mm axial sections with sagittal and coronal  reformats.    COMPARISON: 3/16/2023.    FINDINGS: There is focal bronchiectasis within the right lower lobe  (image 4/59), now with probable mucous plugging.  Noting the noncontrast  technique, the liver is unremarkable.  No focal liver  lesions are  identified.  Best demonstrated on narrow windows, there is the  suggestion of cholelithiasis dependently within the gallbladder.  There  is no biliary ductal dilatation.  The spleen is at the upper limits of  normal in size.  The pancreas, adrenal glands and kidneys are within  normal limits, noting a probable cyst within the lower pole of the right  kidney (image 3/37).  There is no nephrolithiasis.  There is no abnormal  dilatation of either renal collecting system or ureter.  There is no  ureterolithiasis.  The abdominal aorta is normal in caliber, noting mild  vascular calcification.  There is a surgical anastomosis at the  rectoanal junction.  No complication is evident.  There are no findings  of bowel obstruction.  Noting the lack of enteric contrast material,  there is no abnormal bowel wall thickening.  The appendix is not  identified, however there are no findings of appendicitis.  There is no  free intraperitoneal fluid.  The uterus is absent.  The ovaries are not  identified.  There are no abnormal adnexal masses.  There is no lymph  node enlargement.  The bladder is unremarkable.  There is an old  fracture of L2.    Impression  1.  No acute abnormality of the abdomen or pelvis.  There is no  nephrolithiasis or ureterolithiasis.  2.  Incidental findings, as above, similar in appearance to 3/16/2023.      This report was finalized on 10/10/2023 11:05 PM by Steph Fabian MD.       KUB: Results for orders placed during the hospital encounter of 01/10/23    XR Abdomen KUB    Narrative  EXAM:  XR Abdomen, 1 View    EXAM DATE:  1/10/2023 12:05 PM    CLINICAL HISTORY:  flank pain    TECHNIQUE:  Frontal supine view of the abdomen/pelvis.    COMPARISON:  10/01/2022    FINDINGS:  Gastrointestinal tract:  Unremarkable.  No dilation.  Organs:  No stones identified along the expected course of ureters.  Bones/joints:  Unremarkable.  Soft tissues:  Surgical staples in the lower pelvis.  Vasculature:   Stable phleboliths lower pelvis.    Impression  1.  No acute findings radiographically evident.  2.  No radiographic evidence of renal or ureteral stones.    This report was finalized on 1/10/2023 12:47 PM by Dr. Ankit Naik MD.       Labs (past 3 months):      Admission on 01/06/2024, Discharged on 01/06/2024   Component Date Value Ref Range Status    Glucose 01/06/2024 93  65 - 99 mg/dL Final    BUN 01/06/2024 8  6 - 20 mg/dL Final    Creatinine 01/06/2024 0.67  0.57 - 1.00 mg/dL Final    Sodium 01/06/2024 140  136 - 145 mmol/L Final    Potassium 01/06/2024 4.2  3.5 - 5.2 mmol/L Final    Slight hemolysis detected by analyzer. Result may be falsely elevated.    Chloride 01/06/2024 105  98 - 107 mmol/L Final    CO2 01/06/2024 25.0  22.0 - 29.0 mmol/L Final    Calcium 01/06/2024 9.3  8.6 - 10.5 mg/dL Final    Total Protein 01/06/2024 6.2  6.0 - 8.5 g/dL Final    Albumin 01/06/2024 3.6  3.5 - 5.2 g/dL Final    ALT (SGPT) 01/06/2024 38 (H)  1 - 33 U/L Final    AST (SGOT) 01/06/2024 32  1 - 32 U/L Final    Alkaline Phosphatase 01/06/2024 75  39 - 117 U/L Final    Total Bilirubin 01/06/2024 0.3  0.0 - 1.2 mg/dL Final    Globulin 01/06/2024 2.6  gm/dL Final    A/G Ratio 01/06/2024 1.4  g/dL Final    BUN/Creatinine Ratio 01/06/2024 11.9  7.0 - 25.0 Final    Anion Gap 01/06/2024 10.0  5.0 - 15.0 mmol/L Final    eGFR 01/06/2024 108.0  >60.0 mL/min/1.73 Final    Magnesium 01/06/2024 1.8  1.6 - 2.6 mg/dL Final    WBC 01/06/2024 5.57  3.40 - 10.80 10*3/mm3 Final    RBC 01/06/2024 4.67  3.77 - 5.28 10*6/mm3 Final    Hemoglobin 01/06/2024 14.4  12.0 - 15.9 g/dL Final    Hematocrit 01/06/2024 44.4  34.0 - 46.6 % Final    MCV 01/06/2024 95.1  79.0 - 97.0 fL Final    MCH 01/06/2024 30.8  26.6 - 33.0 pg Final    MCHC 01/06/2024 32.4  31.5 - 35.7 g/dL Final    RDW 01/06/2024 13.2  12.3 - 15.4 % Final    RDW-SD 01/06/2024 46.5  37.0 - 54.0 fl Final    MPV 01/06/2024 10.1  6.0 - 12.0 fL Final    Platelets 01/06/2024 158  140 - 450  10*3/mm3 Final    Neutrophil % 01/06/2024 46.0  42.7 - 76.0 % Final    Lymphocyte % 01/06/2024 44.0  19.6 - 45.3 % Final    Monocyte % 01/06/2024 6.3  5.0 - 12.0 % Final    Eosinophil % 01/06/2024 3.1  0.3 - 6.2 % Final    Basophil % 01/06/2024 0.4  0.0 - 1.5 % Final    Immature Grans % 01/06/2024 0.2  0.0 - 0.5 % Final    Neutrophils, Absolute 01/06/2024 2.57  1.70 - 7.00 10*3/mm3 Final    Lymphocytes, Absolute 01/06/2024 2.45  0.70 - 3.10 10*3/mm3 Final    Monocytes, Absolute 01/06/2024 0.35  0.10 - 0.90 10*3/mm3 Final    Eosinophils, Absolute 01/06/2024 0.17  0.00 - 0.40 10*3/mm3 Final    Basophils, Absolute 01/06/2024 0.02  0.00 - 0.20 10*3/mm3 Final    Immature Grans, Absolute 01/06/2024 0.01  0.00 - 0.05 10*3/mm3 Final    nRBC 01/06/2024 0.0  0.0 - 0.2 /100 WBC Final    Extra Tube 01/06/2024 Hold for add-ons.   Final    Auto resulted.    Extra Tube 01/06/2024 hold for add-on   Final    Auto resulted    Extra Tube 01/06/2024 Hold for add-ons.   Final    Auto resulted.    Extra Tube 01/06/2024 Hold for add-ons.   Final    Auto resulted        Procedure:   Urethral dilation-after an appropriate informed consent, the patient was brought to the procedure suite.  The urethra was gently anesthetized with 10 mL of 2% viscous Xylocaine jelly.  After an adequate period of topical anesthesia I went ahead and  dilated with E[place] sounds from 16 to 26 Vincentian sequentially without complication. The patient was given gentamicin as prophylaxis with 80 mg.Urethral obstruction secondary to urethral stricture-posttraumatic  Narcotic pain medication-patient has significant acute pain that I believe would be an indication for the use of narcotic pain medication.  I discussed the significant risks of pain medication and the fact that this will be a short only option and I will give her no more than a three-day supply of pain medication, I will not plan long-term medication, and that this will be sent to a pain clinic if it at all  becomes necessary.  We discussed signing a pain medication agreement and the fact that we're going to run a state [place] review to be sure the patient is not getting pain medication from elsewhere.  If this is the case, we will not give pain medication as part of the patient's treatment plan of there being prescribed a controlled substance with potential for abuse.  This patient has been well aware of the appropriate dose of such medications including the risks for somnolence, limited ability to drive and/or safety and the significant potential for overdose.  It has been made clear that these medications are for the prescribed patient only without concomitant use of alcohol or other substance unless prescribed by the medical provider.  Has completed prescribing agreement detailing the terms of continue prescribing him a controlled substance including monitoring [place] Alberto reports, the possibility of urine drug screens, and pill counts.  The patient is aware that we review LUIS ALBERTO reports on a regular basis and scan them into the chart.  History and physical examination exhibited continued safe and appropriate use of controlled substances. We also discussed the fact that the new [place] legislation allows only a three-day prescription for pain medication.  In this situation he will be referred to a chronic pain clinic.        This document has been electronically signed by VERENICE FINK MD March 5, 2024 10:30 EST    Dictated Utilizing Dragon Dictation: Part of this note may be an electronic transcription/translation of spoken language to printed text using the Dragon Dictation System.

## 2024-04-02 ENCOUNTER — OFFICE VISIT (OUTPATIENT)
Dept: UROLOGY | Facility: CLINIC | Age: 49
End: 2024-04-02
Payer: COMMERCIAL

## 2024-04-02 VITALS
WEIGHT: 102 LBS | HEIGHT: 68 IN | DIASTOLIC BLOOD PRESSURE: 78 MMHG | HEART RATE: 76 BPM | SYSTOLIC BLOOD PRESSURE: 124 MMHG | BODY MASS INDEX: 15.46 KG/M2

## 2024-04-02 DIAGNOSIS — N23 RENAL COLIC: ICD-10-CM

## 2024-04-02 DIAGNOSIS — N35.028 OTHER POST-TRAUMATIC URETHRAL STRICTURE, FEMALE: Primary | ICD-10-CM

## 2024-04-02 RX ORDER — GENTAMICIN SULFATE 40 MG/ML
80 INJECTION, SOLUTION INTRAMUSCULAR; INTRAVENOUS ONCE
Status: COMPLETED | OUTPATIENT
Start: 2024-04-02 | End: 2024-04-02

## 2024-04-02 RX ORDER — OXYCODONE AND ACETAMINOPHEN 10; 325 MG/1; MG/1
1 TABLET ORAL EVERY 6 HOURS PRN
Qty: 20 TABLET | Refills: 0 | Status: SHIPPED | OUTPATIENT
Start: 2024-04-02

## 2024-04-02 RX ADMIN — GENTAMICIN SULFATE 80 MG: 40 INJECTION, SOLUTION INTRAMUSCULAR; INTRAVENOUS at 10:14

## 2024-04-02 NOTE — PROGRESS NOTES
Chief Complaint:      Chief Complaint   Patient presents with    Aftercare following surgery of the genitourinary system       HPI:   49 y.o. female for urethral dilation    Past Medical History:     Past Medical History:   Diagnosis Date    Hoyos's disease     Bipolar 1 disorder     Constipation     DDD (degenerative disc disease), cervical 05/29/2017    Fibromyalgia     IBS (irritable bowel syndrome)     Meningioma     Migraine     PONV (postoperative nausea and vomiting)     PTSD (post-traumatic stress disorder)     Rectal bleeding        Current Meds:     Current Outpatient Medications   Medication Sig Dispense Refill    albuterol (PROVENTIL HFA;VENTOLIN HFA) 108 (90 Base) MCG/ACT inhaler Inhale 2 puffs 2 (Two) Times a Day.      Azelastine HCl 137 MCG/SPRAY solution 1 spray into the nostril(s) as directed by provider As Needed (Allergies).      busPIRone (BUSPAR) 15 MG tablet Take 1 tablet by mouth 3 (Three) Times a Day.      diclofenac (VOLTAREN) 1 % gel gel       divalproex (DEPAKOTE) 500 MG DR tablet Take 1 tablet by mouth 3 (Three) Times a Day. 90 tablet 2    docusate sodium (COLACE) 100 MG capsule Take 1 capsule by mouth 2 (Two) Times a Day. 20 capsule 0    fluticasone (VERAMYST) 27.5 MCG/SPRAY nasal spray 2 sprays into the nostril(s) as directed by provider Daily.      megestrol (MEGACE) 20 MG tablet Take 1 tablet by mouth Daily. 30 tablet 3    methylPREDNISolone (MEDROL) 4 MG dose pack Take as directed on package instructions. 21 tablet 0    metroNIDAZOLE (FLAGYL) 500 MG tablet Take 1 tablet by mouth 3 (Three) Times a Day. 30 tablet 0    montelukast (SINGULAIR) 10 MG tablet Take 1 tablet by mouth Every Night.      nitrofurantoin, macrocrystal-monohydrate, (MACROBID) 100 MG capsule Take 1 capsule by mouth 2 (Two) Times a Day. 13 capsule 0    ondansetron ODT (ZOFRAN-ODT) 4 MG disintegrating tablet Place 2 tablets on the tongue Every 8 (Eight) Hours As Needed for Nausea or Vomiting. 30 tablet 0     oxyCODONE-acetaminophen (Percocet)  MG per tablet Take 1 tablet by mouth Every 6 (Six) Hours As Needed for Moderate Pain. 20 tablet 0    pantoprazole (PROTONIX) 40 MG EC tablet Take 1 tablet by mouth 2 (Two) Times a Day As Needed.      phenazopyridine (PYRIDIUM) 100 MG tablet Take 1 tablet by mouth 3 (Three) Times a Day As Needed for Bladder Spasms. 30 tablet 1    polyethylene glycol (MIRALAX) 17 GM/SCOOP powder Take 17 g by mouth Daily. 225 g 0    promethazine (PHENERGAN) 25 MG tablet Take 1 tablet by mouth Every 6 (Six) Hours As Needed for Nausea or Vomiting. 28 tablet 4    promethazine (PHENERGAN) 25 MG tablet Take 1 tablet by mouth Every 6 (Six) Hours As Needed for Nausea or Vomiting. 21 tablet 5    sertraline (ZOLOFT) 100 MG tablet Take 1 tablet by mouth 2 (Two) Times a Day.      SUMAtriptan (IMITREX) 6 MG/0.5ML injection Inject prescribed dose at onset of headache. May repeat dose one time in 1 hour(s) if headache not relieved.      traMADol (ULTRAM) 50 MG tablet       terazosin (HYTRIN) 2 MG capsule Take 1 capsule by mouth Every Night for 14 days. 14 capsule 0     No current facility-administered medications for this visit.        Allergies:      Allergies   Allergen Reactions    Ativan [Lorazepam] Hallucinations     confusion    Sulfa Antibiotics Shortness Of Breath and Swelling    Sulfa Antibiotics Anaphylaxis    Reglan [Metoclopramide] Angioedema    Compazine [Prochlorperazine Edisylate] Hives    Demerol [Meperidine] Hives    Droperidol Itching    Metoclopramide Swelling    Toradol [Ketorolac Tromethamine] Hives and Itching    Toradol [Ketorolac Tromethamine] Hives    Zosyn [Piperacillin Sod-Tazobactam So] Hives        Past Surgical History:     Past Surgical History:   Procedure Laterality Date    ANAL SCOPE N/A 7/28/2016    Procedure: ANAL SCOPE;  Surgeon: Kael Lopez MD;  Location: [facility];  Service:     APPENDECTOMY      COLONOSCOPY N/A 6/30/2016    Procedure: COLONOSCOPY  CPTCODE:13691;   Surgeon: Jose Antonio Belle III, MD;  Location: [facility] OR;  Service:     COLONOSCOPY N/A 2016    Procedure: COLONOSCOPY (72397) CPT;  Surgeon: Jose Antonio Belle III, MD;  Location: [facility] OR;  Service:     CYSTOSCOPY RETROGRADE PYELOGRAM Bilateral 2017    Procedure: CYSTOSCOPY RETROGRADE PYELOGRAM;  Surgeon: Mu Blunt MD;  Location: [facility] OR;  Service:     ENDOSCOPY N/A 2016    Procedure: ESOPHAGOGASTRODUODENOSCOPY WITH BIOPSY  CPTCODE:51350;  Surgeon: Jose Antonio Belle III, MD;  Location: [facility] OR;  Service:     HEMORRHOIDECTOMY N/A 2016    Procedure: HEMORRHOID STAPLING;  Surgeon: Kael Lopez MD;  Location: [facility] OR;  Service:     HYSTERECTOMY      KNEE SURGERY      LAPAROSCOPIC SALPINGOOPHERECTOMY      PORTACATH PLACEMENT N/A 2017    Procedure: INSERTION OF PORTACATH;  Surgeon: Celso Arredondo MD;  Location: [facility] OR;  Service:     SHOULDER SURGERY      3 times       Social History:     Social History     Socioeconomic History    Marital status:    Tobacco Use    Smoking status: Former     Current packs/day: 0.00     Average packs/day: 1 pack/day for 20.0 years (20.0 ttl pk-yrs)     Types: Cigarettes     Start date: 1995     Quit date: 2015     Years since quittin.4     Passive exposure: Past    Smokeless tobacco: Never   Vaping Use    Vaping status: Never Used   Substance and Sexual Activity    Alcohol use: No    Drug use: Yes     Types: Marijuana     Comment: for pain    Sexual activity: Defer     Birth control/protection: Surgical       Family History:     Family History   Problem Relation Age of Onset    Crohn's disease Other     Hypertension Other     Diabetes Other     Irritable bowel syndrome Other     No Known Problems Father     No Known Problems Mother        Review of Systems:     Review of Systems   Constitutional: Negative.  Negative for activity change, appetite change, chills, diaphoresis, fatigue and unexpected weight  change.   HENT:  Negative for congestion, dental problem, drooling, ear discharge, ear pain, facial swelling, hearing loss, mouth sores, nosebleeds, postnasal drip, rhinorrhea, sinus pressure, sneezing, sore throat, tinnitus, trouble swallowing and voice change.    Eyes: Negative.  Negative for photophobia, pain, discharge, redness, itching and visual disturbance.   Respiratory: Negative.  Negative for apnea, cough, choking, chest tightness, shortness of breath, wheezing and stridor.    Cardiovascular: Negative.  Negative for chest pain, palpitations and leg swelling.   Gastrointestinal: Negative.  Negative for abdominal distention, abdominal pain, anal bleeding, blood in stool, constipation, diarrhea, nausea, rectal pain and vomiting.   Endocrine: Negative.  Negative for cold intolerance, heat intolerance, polydipsia, polyphagia and polyuria.   Genitourinary:  Positive for difficulty urinating.   Musculoskeletal: Negative.  Negative for arthralgias, back pain, gait problem, joint swelling, myalgias, neck pain and neck stiffness.   Skin: Negative.  Negative for color change, pallor, rash and wound.   Allergic/Immunologic: Negative.  Negative for environmental allergies, food allergies and immunocompromised state.   Neurological: Negative.  Negative for dizziness, tremors, seizures, syncope, facial asymmetry, speech difficulty, weakness, light-headedness, numbness and headaches.   Hematological: Negative.  Negative for adenopathy. Does not bruise/bleed easily.   Psychiatric/Behavioral:  Negative for agitation, behavioral problems, confusion, decreased concentration, dysphoric mood, hallucinations, self-injury, sleep disturbance and suicidal ideas. The patient is not nervous/anxious and is not hyperactive.    All other systems reviewed and are negative.      Physical Exam:     Physical Exam  Constitutional:       Appearance: She is well-developed.   HENT:      Head: Normocephalic and atraumatic.      Right Ear:  External ear normal.      Left Ear: External ear normal.   Eyes:      Conjunctiva/sclera: Conjunctivae normal.      Pupils: Pupils are equal, round, and reactive to light.   Cardiovascular:      Rate and Rhythm: Normal rate and regular rhythm.      Heart sounds: Normal heart sounds.   Pulmonary:      Effort: Pulmonary effort is normal.      Breath sounds: Normal breath sounds.   Abdominal:      General: Bowel sounds are normal. There is no distension.      Palpations: Abdomen is soft. There is no mass.      Tenderness: There is no abdominal tenderness. There is no guarding or rebound.   Genitourinary:     General: Normal vulva.      Vagina: No vaginal discharge.   Musculoskeletal:         General: Normal range of motion.   Skin:     General: Skin is warm and dry.   Neurological:      Mental Status: She is alert.      Deep Tendon Reflexes: Reflexes are normal and symmetric.   Psychiatric:         Behavior: Behavior normal.         Thought Content: Thought content normal.         Judgment: Judgment normal.         I have reviewed the following portions of the patient's history: Allergies, current medications, past family history, past medical history, past social history, past surgical history, problem list, and ROS and confirm it is accurate.    Recent Image (CT and/or KUB):      CT Abdomen and Pelvis: No results found for this or any previous visit.       CT Stone Protocol: Results for orders placed during the hospital encounter of 10/10/23    CT Abdomen Pelvis Stone Protocol    Narrative  CT ABDOMEN PELVIS STONE PROTOCOL-: 10/10/2023 9:19 PM    REASON FOR EXAM: Flank pain, kidney stone suspected LT FLANK PAIN.    TECHNIQUE: Noncontrast 4 mm axial sections with sagittal and coronal  reformats.    COMPARISON: 3/16/2023.    FINDINGS: There is focal bronchiectasis within the right lower lobe  (image 4/59), now with probable mucous plugging.  Noting the noncontrast  technique, the liver is unremarkable.  No focal liver  lesions are  identified.  Best demonstrated on narrow windows, there is the  suggestion of cholelithiasis dependently within the gallbladder.  There  is no biliary ductal dilatation.  The spleen is at the upper limits of  normal in size.  The pancreas, adrenal glands and kidneys are within  normal limits, noting a probable cyst within the lower pole of the right  kidney (image 3/37).  There is no nephrolithiasis.  There is no abnormal  dilatation of either renal collecting system or ureter.  There is no  ureterolithiasis.  The abdominal aorta is normal in caliber, noting mild  vascular calcification.  There is a surgical anastomosis at the  rectoanal junction.  No complication is evident.  There are no findings  of bowel obstruction.  Noting the lack of enteric contrast material,  there is no abnormal bowel wall thickening.  The appendix is not  identified, however there are no findings of appendicitis.  There is no  free intraperitoneal fluid.  The uterus is absent.  The ovaries are not  identified.  There are no abnormal adnexal masses.  There is no lymph  node enlargement.  The bladder is unremarkable.  There is an old  fracture of L2.    Impression  1.  No acute abnormality of the abdomen or pelvis.  There is no  nephrolithiasis or ureterolithiasis.  2.  Incidental findings, as above, similar in appearance to 3/16/2023.      This report was finalized on 10/10/2023 11:05 PM by Steph Fabian MD.       KUB: Results for orders placed during the hospital encounter of 01/10/23    XR Abdomen KUB    Narrative  EXAM:  XR Abdomen, 1 View    EXAM DATE:  1/10/2023 12:05 PM    CLINICAL HISTORY:  flank pain    TECHNIQUE:  Frontal supine view of the abdomen/pelvis.    COMPARISON:  10/01/2022    FINDINGS:  Gastrointestinal tract:  Unremarkable.  No dilation.  Organs:  No stones identified along the expected course of ureters.  Bones/joints:  Unremarkable.  Soft tissues:  Surgical staples in the lower pelvis.  Vasculature:   Stable phleboliths lower pelvis.    Impression  1.  No acute findings radiographically evident.  2.  No radiographic evidence of renal or ureteral stones.    This report was finalized on 1/10/2023 12:47 PM by Dr. Ankit Naik MD.       Labs (past 3 months):      Admission on 01/06/2024, Discharged on 01/06/2024   Component Date Value Ref Range Status    Glucose 01/06/2024 93  65 - 99 mg/dL Final    BUN 01/06/2024 8  6 - 20 mg/dL Final    Creatinine 01/06/2024 0.67  0.57 - 1.00 mg/dL Final    Sodium 01/06/2024 140  136 - 145 mmol/L Final    Potassium 01/06/2024 4.2  3.5 - 5.2 mmol/L Final    Slight hemolysis detected by analyzer. Result may be falsely elevated.    Chloride 01/06/2024 105  98 - 107 mmol/L Final    CO2 01/06/2024 25.0  22.0 - 29.0 mmol/L Final    Calcium 01/06/2024 9.3  8.6 - 10.5 mg/dL Final    Total Protein 01/06/2024 6.2  6.0 - 8.5 g/dL Final    Albumin 01/06/2024 3.6  3.5 - 5.2 g/dL Final    ALT (SGPT) 01/06/2024 38 (H)  1 - 33 U/L Final    AST (SGOT) 01/06/2024 32  1 - 32 U/L Final    Alkaline Phosphatase 01/06/2024 75  39 - 117 U/L Final    Total Bilirubin 01/06/2024 0.3  0.0 - 1.2 mg/dL Final    Globulin 01/06/2024 2.6  gm/dL Final    A/G Ratio 01/06/2024 1.4  g/dL Final    BUN/Creatinine Ratio 01/06/2024 11.9  7.0 - 25.0 Final    Anion Gap 01/06/2024 10.0  5.0 - 15.0 mmol/L Final    eGFR 01/06/2024 108.0  >60.0 mL/min/1.73 Final    Magnesium 01/06/2024 1.8  1.6 - 2.6 mg/dL Final    WBC 01/06/2024 5.57  3.40 - 10.80 10*3/mm3 Final    RBC 01/06/2024 4.67  3.77 - 5.28 10*6/mm3 Final    Hemoglobin 01/06/2024 14.4  12.0 - 15.9 g/dL Final    Hematocrit 01/06/2024 44.4  34.0 - 46.6 % Final    MCV 01/06/2024 95.1  79.0 - 97.0 fL Final    MCH 01/06/2024 30.8  26.6 - 33.0 pg Final    MCHC 01/06/2024 32.4  31.5 - 35.7 g/dL Final    RDW 01/06/2024 13.2  12.3 - 15.4 % Final    RDW-SD 01/06/2024 46.5  37.0 - 54.0 fl Final    MPV 01/06/2024 10.1  6.0 - 12.0 fL Final    Platelets 01/06/2024 158  140 - 450  10*3/mm3 Final    Neutrophil % 01/06/2024 46.0  42.7 - 76.0 % Final    Lymphocyte % 01/06/2024 44.0  19.6 - 45.3 % Final    Monocyte % 01/06/2024 6.3  5.0 - 12.0 % Final    Eosinophil % 01/06/2024 3.1  0.3 - 6.2 % Final    Basophil % 01/06/2024 0.4  0.0 - 1.5 % Final    Immature Grans % 01/06/2024 0.2  0.0 - 0.5 % Final    Neutrophils, Absolute 01/06/2024 2.57  1.70 - 7.00 10*3/mm3 Final    Lymphocytes, Absolute 01/06/2024 2.45  0.70 - 3.10 10*3/mm3 Final    Monocytes, Absolute 01/06/2024 0.35  0.10 - 0.90 10*3/mm3 Final    Eosinophils, Absolute 01/06/2024 0.17  0.00 - 0.40 10*3/mm3 Final    Basophils, Absolute 01/06/2024 0.02  0.00 - 0.20 10*3/mm3 Final    Immature Grans, Absolute 01/06/2024 0.01  0.00 - 0.05 10*3/mm3 Final    nRBC 01/06/2024 0.0  0.0 - 0.2 /100 WBC Final    Extra Tube 01/06/2024 Hold for add-ons.   Final    Auto resulted.    Extra Tube 01/06/2024 hold for add-on   Final    Auto resulted    Extra Tube 01/06/2024 Hold for add-ons.   Final    Auto resulted.    Extra Tube 01/06/2024 Hold for add-ons.   Final    Auto resulted        Procedure:   Urethral dilation-after an appropriate informed consent, the patient was brought to the procedure suite.  The urethra was gently anesthetized with 10 mL of 2% viscous Xylocaine jelly.  After an adequate period of topical anesthesia I went ahead and  gently anesthetized and dilated with [place] sounds from 16 to 26 Malay sequentially without complication. The patient was given gentamicin as prophylaxis with 80 mg.    Assessment/Plan:   Posttraumatic urethral stricture  Narcotic pain medication-patient has significant acute pain that I believe would be an indication for the use of narcotic pain medication.  I discussed the significant risks of pain medication and the fact that this will be a short only option and I will give her no more than a three-day supply of pain medication, I will not plan long-term medication, and that this will be sent to a pain clinic  if it at all becomes necessary.  We discussed signing a pain medication agreement and the fact that we're going to run a state [place] review to be sure the patient is not getting pain medication from elsewhere.  If this is the case, we will not give pain medication as part of the patient's treatment plan of there being prescribed a controlled substance with potential for abuse.  This patient has been well aware of the appropriate dose of such medications including the risks for somnolence, limited ability to drive and/or safety and the significant potential for overdose.  It has been made clear that these medications are for the prescribed patient only without concomitant use of alcohol or other substance unless prescribed by the medical provider.  Has completed prescribing agreement detailing the terms of continue prescribing him a controlled substance including monitoring [place] reports, the possibility of urine drug screens, and pill counts.  The patient is aware that we review [place]BERTO reports on a regular basis and scan them into the chart.  History and physical examination exhibited continued safe and appropriate use of controlled substances. We also discussed the fact that the new [place] legislation allows only a three-day prescription for pain medication.  In this situation he will be referred to a chronic pain clinic.          This document has been electronically signed by VERENICE FINK MD April 2, 2024 09:55 EDT    Dictated Utilizing Dragon Dictation: Part of this note may be an electronic transcription/translation of spoken language to printed text using the Dragon Dictation System.

## 2024-04-19 ENCOUNTER — APPOINTMENT (OUTPATIENT)
Dept: GENERAL RADIOLOGY | Facility: HOSPITAL | Age: 49
End: 2024-04-19
Payer: COMMERCIAL

## 2024-04-19 ENCOUNTER — HOSPITAL ENCOUNTER (EMERGENCY)
Facility: HOSPITAL | Age: 49
Discharge: HOME | End: 2024-04-19
Attending: EMERGENCY MEDICINE
Payer: COMMERCIAL

## 2024-04-19 VITALS
HEART RATE: 113 BPM | BODY MASS INDEX: 19.1 KG/M2 | SYSTOLIC BLOOD PRESSURE: 112 MMHG | WEIGHT: 126 LBS | DIASTOLIC BLOOD PRESSURE: 72 MMHG | RESPIRATION RATE: 17 BRPM | HEIGHT: 68 IN | TEMPERATURE: 98 F | OXYGEN SATURATION: 97 %

## 2024-04-19 DIAGNOSIS — E87.6 HYPOKALEMIA: ICD-10-CM

## 2024-04-19 DIAGNOSIS — G43.809 OTHER MIGRAINE WITHOUT STATUS MIGRAINOSUS, NOT INTRACTABLE: Primary | ICD-10-CM

## 2024-04-19 DIAGNOSIS — E83.42 HYPOMAGNESEMIA: ICD-10-CM

## 2024-04-19 LAB
ALBUMIN SERPL-MCNC: 3.6 G/DL (ref 3.5–5.2)
ALBUMIN/GLOB SERPL: 1.7 G/DL
ALP SERPL-CCNC: 76 U/L (ref 39–117)
ALT SERPL W P-5'-P-CCNC: 20 U/L (ref 1–33)
ANION GAP SERPL CALCULATED.3IONS-SCNC: 11 MMOL/L (ref 5–15)
AST SERPL-CCNC: 21 U/L (ref 1–32)
BASOPHILS # BLD AUTO: 0.02 10*3/MM3 (ref 0–0.2)
BASOPHILS NFR BLD AUTO: 0.3 % (ref 0–1.5)
BILIRUB SERPL-MCNC: 0.5 MG/DL (ref 0–1.2)
BUN SERPL-MCNC: 6 MG/DL (ref 6–20)
BUN/CREAT SERPL: 15 (ref 7–25)
CALCIUM SPEC-SCNC: 7.3 MG/DL (ref 8.6–10.5)
CHLORIDE SERPL-SCNC: 109 MMOL/L (ref 98–107)
CO2 SERPL-SCNC: 21 MMOL/L (ref 22–29)
CREAT SERPL-MCNC: 0.4 MG/DL (ref 0.57–1)
CRP SERPL-MCNC: <0.3 MG/DL (ref 0–0.5)
DEPRECATED RDW RBC AUTO: 46.7 FL (ref 37–54)
EGFRCR SERPLBLD CKD-EPI 2021: 121.5 ML/MIN/1.73
EOSINOPHIL # BLD AUTO: 0.11 10*3/MM3 (ref 0–0.4)
EOSINOPHIL NFR BLD AUTO: 1.5 % (ref 0.3–6.2)
ERYTHROCYTE [DISTWIDTH] IN BLOOD BY AUTOMATED COUNT: 13.2 % (ref 12.3–15.4)
ERYTHROCYTE [SEDIMENTATION RATE] IN BLOOD: 1 MM/HR (ref 0–20)
GLOBULIN UR ELPH-MCNC: 2.1 GM/DL
GLUCOSE SERPL-MCNC: 102 MG/DL (ref 65–99)
HCT VFR BLD AUTO: 41.9 % (ref 34–46.6)
HGB BLD-MCNC: 13.7 G/DL (ref 12–15.9)
IMM GRANULOCYTES # BLD AUTO: 0.07 10*3/MM3 (ref 0–0.05)
IMM GRANULOCYTES NFR BLD AUTO: 1 % (ref 0–0.5)
LYMPHOCYTES # BLD AUTO: 2.6 10*3/MM3 (ref 0.7–3.1)
LYMPHOCYTES NFR BLD AUTO: 35.9 % (ref 19.6–45.3)
MAGNESIUM SERPL-MCNC: 1.5 MG/DL (ref 1.6–2.6)
MCH RBC QN AUTO: 31.4 PG (ref 26.6–33)
MCHC RBC AUTO-ENTMCNC: 32.7 G/DL (ref 31.5–35.7)
MCV RBC AUTO: 95.9 FL (ref 79–97)
MONOCYTES # BLD AUTO: 0.41 10*3/MM3 (ref 0.1–0.9)
MONOCYTES NFR BLD AUTO: 5.7 % (ref 5–12)
NEUTROPHILS NFR BLD AUTO: 4.03 10*3/MM3 (ref 1.7–7)
NEUTROPHILS NFR BLD AUTO: 55.6 % (ref 42.7–76)
NRBC BLD AUTO-RTO: 0 /100 WBC (ref 0–0.2)
PLATELET # BLD AUTO: 170 10*3/MM3 (ref 140–450)
PMV BLD AUTO: 10.1 FL (ref 6–12)
POTASSIUM SERPL-SCNC: 3 MMOL/L (ref 3.5–5.2)
PROT SERPL-MCNC: 5.7 G/DL (ref 6–8.5)
RBC # BLD AUTO: 4.37 10*6/MM3 (ref 3.77–5.28)
SODIUM SERPL-SCNC: 141 MMOL/L (ref 136–145)
VALPROATE SERPL-MCNC: <2.8 MCG/ML (ref 50–125)
WBC NRBC COR # BLD AUTO: 7.24 10*3/MM3 (ref 3.4–10.8)

## 2024-04-19 PROCEDURE — 86140 C-REACTIVE PROTEIN: CPT | Performed by: EMERGENCY MEDICINE

## 2024-04-19 PROCEDURE — 96374 THER/PROPH/DIAG INJ IV PUSH: CPT

## 2024-04-19 PROCEDURE — 96375 TX/PRO/DX INJ NEW DRUG ADDON: CPT

## 2024-04-19 PROCEDURE — 96376 TX/PRO/DX INJ SAME DRUG ADON: CPT

## 2024-04-19 PROCEDURE — 25010000002 HYDROMORPHONE PER 4 MG: Performed by: EMERGENCY MEDICINE

## 2024-04-19 PROCEDURE — 83735 ASSAY OF MAGNESIUM: CPT | Performed by: EMERGENCY MEDICINE

## 2024-04-19 PROCEDURE — 80053 COMPREHEN METABOLIC PANEL: CPT | Performed by: EMERGENCY MEDICINE

## 2024-04-19 PROCEDURE — 25010000002 ONDANSETRON PER 1 MG: Performed by: EMERGENCY MEDICINE

## 2024-04-19 PROCEDURE — 25010000002 DIPHENHYDRAMINE PER 50 MG: Performed by: EMERGENCY MEDICINE

## 2024-04-19 PROCEDURE — 80164 ASSAY DIPROPYLACETIC ACD TOT: CPT | Performed by: EMERGENCY MEDICINE

## 2024-04-19 PROCEDURE — 85652 RBC SED RATE AUTOMATED: CPT | Performed by: EMERGENCY MEDICINE

## 2024-04-19 PROCEDURE — 73630 X-RAY EXAM OF FOOT: CPT | Performed by: RADIOLOGY

## 2024-04-19 PROCEDURE — 99283 EMERGENCY DEPT VISIT LOW MDM: CPT

## 2024-04-19 PROCEDURE — 25810000003 SODIUM CHLORIDE 0.9 % SOLUTION: Performed by: EMERGENCY MEDICINE

## 2024-04-19 PROCEDURE — 85025 COMPLETE CBC W/AUTO DIFF WBC: CPT | Performed by: EMERGENCY MEDICINE

## 2024-04-19 PROCEDURE — 73630 X-RAY EXAM OF FOOT: CPT

## 2024-04-19 PROCEDURE — 25010000002 DEXAMETHASONE SODIUM PHOSPHATE 10 MG/ML SOLUTION: Performed by: EMERGENCY MEDICINE

## 2024-04-19 PROCEDURE — 25010000002 HYDROMORPHONE 1 MG/ML SOLUTION: Performed by: EMERGENCY MEDICINE

## 2024-04-19 RX ORDER — SODIUM CHLORIDE 0.9 % (FLUSH) 0.9 %
10 SYRINGE (ML) INJECTION AS NEEDED
Status: DISCONTINUED | OUTPATIENT
Start: 2024-04-19 | End: 2024-04-20 | Stop reason: HOSPADM

## 2024-04-19 RX ORDER — MAGNESIUM 30 MG
30 TABLET ORAL DAILY
Qty: 10 TABLET | Refills: 0 | Status: SHIPPED | OUTPATIENT
Start: 2024-04-19 | End: 2025-04-19

## 2024-04-19 RX ORDER — DIPHENHYDRAMINE HYDROCHLORIDE 50 MG/ML
25 INJECTION INTRAMUSCULAR; INTRAVENOUS ONCE
Status: COMPLETED | OUTPATIENT
Start: 2024-04-19 | End: 2024-04-19

## 2024-04-19 RX ORDER — POTASSIUM CHLORIDE 20 MEQ/1
40 TABLET, EXTENDED RELEASE ORAL ONCE
Status: COMPLETED | OUTPATIENT
Start: 2024-04-19 | End: 2024-04-19

## 2024-04-19 RX ORDER — HYDROMORPHONE HYDROCHLORIDE 1 MG/ML
0.25 INJECTION, SOLUTION INTRAMUSCULAR; INTRAVENOUS; SUBCUTANEOUS ONCE
Status: COMPLETED | OUTPATIENT
Start: 2024-04-19 | End: 2024-04-19

## 2024-04-19 RX ORDER — POTASSIUM CHLORIDE 20 MEQ/1
20 TABLET, EXTENDED RELEASE ORAL DAILY
Qty: 10 TABLET | Refills: 0 | Status: SHIPPED | OUTPATIENT
Start: 2024-04-19

## 2024-04-19 RX ORDER — ONDANSETRON 2 MG/ML
4 INJECTION INTRAMUSCULAR; INTRAVENOUS ONCE
Status: COMPLETED | OUTPATIENT
Start: 2024-04-19 | End: 2024-04-19

## 2024-04-19 RX ORDER — DEXAMETHASONE SODIUM PHOSPHATE 10 MG/ML
10 INJECTION, SOLUTION INTRAMUSCULAR; INTRAVENOUS ONCE
Status: COMPLETED | OUTPATIENT
Start: 2024-04-19 | End: 2024-04-19

## 2024-04-19 RX ADMIN — POTASSIUM CHLORIDE 40 MEQ: 1500 TABLET, EXTENDED RELEASE ORAL at 22:55

## 2024-04-19 RX ADMIN — HYDROMORPHONE HYDROCHLORIDE 1 MG: 1 INJECTION, SOLUTION INTRAMUSCULAR; INTRAVENOUS; SUBCUTANEOUS at 20:22

## 2024-04-19 RX ADMIN — HYDROMORPHONE HYDROCHLORIDE 0.25 MG: 1 INJECTION, SOLUTION INTRAMUSCULAR; INTRAVENOUS; SUBCUTANEOUS at 22:55

## 2024-04-19 RX ADMIN — DIPHENHYDRAMINE HYDROCHLORIDE 25 MG: 50 INJECTION, SOLUTION INTRAMUSCULAR; INTRAVENOUS at 20:22

## 2024-04-19 RX ADMIN — DEXAMETHASONE SODIUM PHOSPHATE 10 MG: 10 INJECTION INTRAMUSCULAR; INTRAVENOUS at 20:22

## 2024-04-19 RX ADMIN — ONDANSETRON 4 MG: 2 INJECTION INTRAMUSCULAR; INTRAVENOUS at 20:22

## 2024-04-19 RX ADMIN — SODIUM CHLORIDE 500 ML: 9 INJECTION, SOLUTION INTRAVENOUS at 20:22

## 2024-04-20 ENCOUNTER — APPOINTMENT (OUTPATIENT)
Dept: CT IMAGING | Facility: HOSPITAL | Age: 49
End: 2024-04-20
Payer: COMMERCIAL

## 2024-04-20 ENCOUNTER — HOSPITAL ENCOUNTER (EMERGENCY)
Facility: HOSPITAL | Age: 49
Discharge: LEFT BEFORE DISCHARGE | End: 2024-04-20
Payer: COMMERCIAL

## 2024-04-20 VITALS
RESPIRATION RATE: 16 BRPM | SYSTOLIC BLOOD PRESSURE: 113 MMHG | HEART RATE: 84 BPM | TEMPERATURE: 97.5 F | BODY MASS INDEX: 18.19 KG/M2 | HEIGHT: 68 IN | DIASTOLIC BLOOD PRESSURE: 55 MMHG | OXYGEN SATURATION: 96 % | WEIGHT: 120 LBS

## 2024-04-20 PROCEDURE — 70450 CT HEAD/BRAIN W/O DYE: CPT

## 2024-04-20 PROCEDURE — 99284 EMERGENCY DEPT VISIT MOD MDM: CPT

## 2024-04-20 PROCEDURE — 70450 CT HEAD/BRAIN W/O DYE: CPT | Performed by: RADIOLOGY

## 2024-04-20 RX ORDER — ACETAMINOPHEN 500 MG
1000 TABLET ORAL ONCE
Status: DISCONTINUED | OUTPATIENT
Start: 2024-04-20 | End: 2024-04-20 | Stop reason: HOSPADM

## 2024-04-20 RX ORDER — DEXAMETHASONE SODIUM PHOSPHATE 10 MG/ML
5 INJECTION, SOLUTION INTRAMUSCULAR; INTRAVENOUS ONCE
Status: DISCONTINUED | OUTPATIENT
Start: 2024-04-20 | End: 2024-04-20 | Stop reason: HOSPADM

## 2024-04-20 NOTE — ED NOTES
Provided patient with urine cup and informed her of the importance of the urine sample, will check back

## 2024-04-20 NOTE — ED NOTES
MEDICAL SCREENING:    Reason for Visit: Worsening chronic migraine    Patient initially seen in triage.  The patient was advised further evaluation and diagnostic testing will be needed, some of the treatment and testing will be initiated in the lobby in order to begin the process.  The patient will be returned to the waiting area for the time being and possibly be re-assessed by a subsequent ED provider.  The patient will be brought back to the treatment area in as timely manner as possible.       Jean Paul Hussein, DO  04/20/24 [id number]

## 2024-04-23 NOTE — ED PROVIDER NOTES
Subjective     History provided by:  Patient   used: No    Headache  Pain location:  Generalized  Quality:  Sharp  Radiates to:  Does not radiate  Severity currently:  10/10  Severity at highest:  10/10  Onset quality:  Gradual  Duration:  3 days  Timing:  Constant  Progression:  Worsening  Chronicity:  Chronic  Similar to prior headaches: yes    Context: activity, bright light, emotional stress and loud noise    Context: not caffeine, not coughing, not defecating, not eating, not exposure to cold air, not intercourse and not straining    Relieved by:  Prescription medications  Worsened by:  Nothing  Ineffective treatments:  None tried  Associated symptoms: nausea and vomiting    Associated symptoms: no abdominal pain, no back pain, no blurred vision, no congestion, no cough, no diarrhea, no dizziness, no drainage, no ear pain, no eye pain, no facial pain, no fatigue, no fever, no focal weakness, no hearing loss, no loss of balance, no myalgias, no near-syncope, no neck pain, no neck stiffness, no numbness, no paresthesias, no photophobia, no seizures, no sinus pressure, no sore throat, no swollen glands, no syncope, no tingling, no URI, no visual change and no weakness    Risk factors: no anger, no family hx of SAH, does not have insomnia and lifestyle not sedentary        Review of Systems   Constitutional:  Negative for activity change, appetite change, chills, diaphoresis, fatigue and fever.   HENT:  Negative for congestion, ear pain, hearing loss, postnasal drip, sinus pressure and sore throat.    Eyes:  Negative for blurred vision, photophobia, pain and redness.   Respiratory:  Negative for cough, chest tightness, shortness of breath and wheezing.    Cardiovascular:  Negative for chest pain, palpitations, leg swelling, syncope and near-syncope.   Gastrointestinal:  Positive for nausea and vomiting. Negative for abdominal pain and diarrhea.   Genitourinary:  Negative for dysuria and urgency.    Musculoskeletal:  Negative for arthralgias, back pain, myalgias, neck pain and neck stiffness.   Skin:  Negative for pallor, rash and wound.   Neurological:  Positive for headaches. Negative for dizziness, focal weakness, seizures, speech difficulty, weakness, numbness, paresthesias and loss of balance.   Psychiatric/Behavioral:  Negative for agitation, behavioral problems, confusion and decreased concentration.    All other systems reviewed and are negative.      Past Medical History:   Diagnosis Date    Hoyos's disease     Bipolar 1 disorder     Constipation     DDD (degenerative disc disease), cervical 05/29/2017    Fibromyalgia     IBS (irritable bowel syndrome)     Meningioma     Migraine     PONV (postoperative nausea and vomiting)     PTSD (post-traumatic stress disorder)     Rectal bleeding        Allergies   Allergen Reactions    Ativan [Lorazepam] Hallucinations     confusion    Sulfa Antibiotics Shortness Of Breath and Swelling    Sulfa Antibiotics Anaphylaxis    Reglan [Metoclopramide] Angioedema    Compazine [Prochlorperazine Edisylate] Hives    Demerol [Meperidine] Hives    Droperidol Itching    Metoclopramide Swelling    Toradol [Ketorolac Tromethamine] Hives and Itching    Toradol [Ketorolac Tromethamine] Hives    Zosyn [Piperacillin Sod-Tazobactam So] Hives       Past Surgical History:   Procedure Laterality Date    ANAL SCOPE N/A 7/28/2016    Procedure: ANAL SCOPE;  Surgeon: Kael Lopez MD;  Location: [facility] OR;  Service:     APPENDECTOMY      COLONOSCOPY N/A 6/30/2016    Procedure: COLONOSCOPY  CPTCODE:05906;  Surgeon: Jose Antonio Belle III, MD;  Location: [facility] OR;  Service:     COLONOSCOPY N/A 7/7/2016    Procedure: COLONOSCOPY (09773) CPT;  Surgeon: Jose Antonio Belle III, MD;  Location: [facility] OR;  Service:     CYSTOSCOPY RETROGRADE PYELOGRAM Bilateral 4/28/2017    Procedure: CYSTOSCOPY RETROGRADE PYELOGRAM;  Surgeon: Mu Blunt MD;  Location: [facility] OR;  Service:      ENDOSCOPY N/A 2016    Procedure: ESOPHAGOGASTRODUODENOSCOPY WITH BIOPSY  CPTCODE:74800;  Surgeon: Jose Antonio Belle III, MD;  Location: [facility] OR;  Service:     HEMORRHOIDECTOMY N/A 2016    Procedure: HEMORRHOID STAPLING;  Surgeon: Kael Lopez MD;  Location: [facility] OR;  Service:     HYSTERECTOMY      KNEE SURGERY      LAPAROSCOPIC SALPINGOOPHERECTOMY      PORTACATH PLACEMENT N/A 2017    Procedure: INSERTION OF PORTACATH;  Surgeon: Celso Arredondo MD;  Location: [facility] OR;  Service:     SHOULDER SURGERY      3 times       Family History   Problem Relation Age of Onset    Crohn's disease Other     Hypertension Other     Diabetes Other     Irritable bowel syndrome Other     No Known Problems Father     No Known Problems Mother        Social History     Socioeconomic History    Marital status:    Tobacco Use    Smoking status: Former     Current packs/day: 0.00     Average packs/day: 1 pack/day for 20.0 years (20.0 ttl pk-yrs)     Types: Cigarettes     Start date: 1995     Quit date: 2015     Years since quittin.4     Passive exposure: Past    Smokeless tobacco: Never   Vaping Use    Vaping status: Never Used   Substance and Sexual Activity    Alcohol use: No    Drug use: Yes     Types: Marijuana     Comment: for pain    Sexual activity: Defer     Birth control/protection: Surgical           Objective   Physical Exam  Vitals and nursing note reviewed.   Constitutional:       General: She is not in acute distress.     Appearance: Normal appearance. She is well-developed. She is not toxic-appearing or diaphoretic.   HENT:      Head: Normocephalic and atraumatic.      Right Ear: External ear normal.      Left Ear: External ear normal.      Nose: Nose normal.      Mouth/Throat:      Pharynx: No oropharyngeal exudate.      Tonsils: No tonsillar exudate.   Eyes:      General: Lids are normal.      Conjunctiva/sclera: Conjunctivae normal.      Pupils: Pupils are equal, round, and  reactive to light.   Neck:      Thyroid: No thyromegaly.   Cardiovascular:      Rate and Rhythm: Normal rate and regular rhythm.      Pulses: Normal pulses.      Heart sounds: Normal heart sounds, S1 normal and S2 normal.   Pulmonary:      Effort: Pulmonary effort is normal. No tachypnea or respiratory distress.      Breath sounds: Normal breath sounds. No decreased breath sounds, wheezing or rales.   Chest:      Chest wall: No tenderness.   Abdominal:      General: Bowel sounds are normal. There is no distension.      Palpations: Abdomen is soft.      Tenderness: There is no abdominal tenderness. There is no guarding or rebound.   Musculoskeletal:         General: Tenderness present. No deformity. Normal range of motion.      Cervical back: Full passive range of motion without pain, normal range of motion and neck supple.        Feet:    Lymphadenopathy:      Cervical: No cervical adenopathy.   Skin:     General: Skin is warm and dry.      Coloration: Skin is not pale.      Findings: No erythema or rash.   Neurological:      Mental Status: She is alert and oriented to person, place, and time.      GCS: GCS eye subscore is 4. GCS verbal subscore is 5. GCS motor subscore is 6.      Cranial Nerves: No cranial nerve deficit.      Sensory: No sensory deficit.   Psychiatric:         Speech: Speech normal.         Behavior: Behavior normal.         Thought Content: Thought content normal.         Judgment: Judgment normal.         Procedures           ED Course  ED Course as of 04/23/24 1107   Tue Apr 23, 2024   1105 XR Foot 3+ View Right  IMPRESSION:     1.  No acute fracture or dislocation.  2.  Dense linear foreign body noted adjacent to the medial aspect of the  base of the distal phalanx of the right great toe.  3.  The dense linear foreign body measures 2.6 x 1 mm and is seen on all  3 films.  4.  No features of osteomyelitis  5.  No air in the soft tissues  6.  No hematoma.   [ES]      ED Course User Index  [ES]  Jaiden Bonner MD                                             Medical Decision Making  Problems Addressed:  Hypokalemia: complicated acute illness or injury  Hypomagnesemia: complicated acute illness or injury  Other migraine without status migrainosus, not intractable: complicated acute illness or injury    Amount and/or Complexity of Data Reviewed  Labs: ordered.  Radiology: ordered.    Risk  OTC drugs.  Prescription drug management.        Final diagnoses:   Other migraine without status migrainosus, not intractable   Hypokalemia   Hypomagnesemia       ED Disposition  ED Disposition       ED Disposition   Discharge    Condition   Stable    Comment   --               Mabel Patterson, APRN  [address] [facility] [address]  [place] [id number]  [phone]    Schedule an appointment as soon as possible for a visit in 1 day  EVALUATE         Medication List        New Prescriptions      magnesium 30 MG tablet  Take 1 tablet by mouth Daily.     potassium chloride 20 MEQ CR tablet  Commonly known as: KLOR-CON M20  Take 1 tablet by mouth Daily.               Where to Get Your Medications        These medications were sent to [address] [phone] [place] [phone] [address] [address]      Phone: [phone]   magnesium 30 MG tablet  potassium chloride 20 MEQ CR tablet            Jaiden Bonner MD  04/23/24 4820

## 2024-05-06 ENCOUNTER — OFFICE VISIT (OUTPATIENT)
Dept: UROLOGY | Facility: CLINIC | Age: 49
End: 2024-05-06
Payer: COMMERCIAL

## 2024-05-06 VITALS
HEIGHT: 68 IN | HEART RATE: 89 BPM | SYSTOLIC BLOOD PRESSURE: 114 MMHG | DIASTOLIC BLOOD PRESSURE: 84 MMHG | BODY MASS INDEX: 15.79 KG/M2 | WEIGHT: 104.2 LBS

## 2024-05-06 DIAGNOSIS — Z48.816 AFTERCARE FOLLOWING SURGERY OF THE GENITOURINARY SYSTEM: Primary | ICD-10-CM

## 2024-05-06 DIAGNOSIS — N35.028 OTHER POST-TRAUMATIC URETHRAL STRICTURE, FEMALE: ICD-10-CM

## 2024-05-06 DIAGNOSIS — N23 RENAL COLIC: ICD-10-CM

## 2024-05-06 RX ORDER — GENTAMICIN 40 MG/ML
80 INJECTION, SOLUTION INTRAMUSCULAR; INTRAVENOUS ONCE
Status: COMPLETED | OUTPATIENT
Start: 2024-05-06 | End: 2024-05-06

## 2024-05-06 RX ORDER — OXYCODONE AND ACETAMINOPHEN 10; 325 MG/1; MG/1
1 TABLET ORAL EVERY 6 HOURS PRN
Qty: 20 TABLET | Refills: 0 | Status: SHIPPED | OUTPATIENT
Start: 2024-05-06

## 2024-05-06 RX ORDER — PROMETHAZINE HYDROCHLORIDE 25 MG/1
25 TABLET ORAL EVERY 6 HOURS PRN
Qty: 28 TABLET | Refills: 4 | Status: SHIPPED | OUTPATIENT
Start: 2024-05-06

## 2024-05-06 RX ADMIN — GENTAMICIN 80 MG: 40 INJECTION, SOLUTION INTRAMUSCULAR; INTRAVENOUS at 13:01

## 2024-05-15 ENCOUNTER — HOSPITAL ENCOUNTER (EMERGENCY)
Facility: HOSPITAL | Age: 49
Discharge: HOME | End: 2024-05-15
Attending: STUDENT IN AN ORGANIZED HEALTH CARE EDUCATION/TRAINING PROGRAM
Payer: COMMERCIAL

## 2024-05-15 VITALS
OXYGEN SATURATION: 99 % | HEART RATE: 84 BPM | RESPIRATION RATE: 20 BRPM | HEIGHT: 68 IN | BODY MASS INDEX: 16.67 KG/M2 | DIASTOLIC BLOOD PRESSURE: 89 MMHG | WEIGHT: 110 LBS | SYSTOLIC BLOOD PRESSURE: 125 MMHG | TEMPERATURE: 98.1 F

## 2024-05-15 DIAGNOSIS — R51.9 ACUTE NONINTRACTABLE HEADACHE, UNSPECIFIED HEADACHE TYPE: Primary | ICD-10-CM

## 2024-05-15 LAB
ALBUMIN SERPL-MCNC: 4.1 G/DL (ref 3.5–5.2)
ALBUMIN/GLOB SERPL: 1.6 G/DL
ALP SERPL-CCNC: 96 U/L (ref 39–117)
ALT SERPL W P-5'-P-CCNC: 49 U/L (ref 1–33)
ANION GAP SERPL CALCULATED.3IONS-SCNC: 7.5 MMOL/L (ref 5–15)
AST SERPL-CCNC: 40 U/L (ref 1–32)
BASOPHILS # BLD AUTO: 0.02 10*3/MM3 (ref 0–0.2)
BASOPHILS NFR BLD AUTO: 0.4 % (ref 0–1.5)
BILIRUB SERPL-MCNC: 0.5 MG/DL (ref 0–1.2)
BUN SERPL-MCNC: 9 MG/DL (ref 6–20)
BUN/CREAT SERPL: 16.4 (ref 7–25)
CALCIUM SPEC-SCNC: 8.9 MG/DL (ref 8.6–10.5)
CHLORIDE SERPL-SCNC: 103 MMOL/L (ref 98–107)
CO2 SERPL-SCNC: 29.5 MMOL/L (ref 22–29)
CREAT SERPL-MCNC: 0.55 MG/DL (ref 0.57–1)
CRP SERPL-MCNC: <0.3 MG/DL (ref 0–0.5)
DEPRECATED RDW RBC AUTO: 45.6 FL (ref 37–54)
EGFRCR SERPLBLD CKD-EPI 2021: 112.5 ML/MIN/1.73
EOSINOPHIL # BLD AUTO: 0.14 10*3/MM3 (ref 0–0.4)
EOSINOPHIL NFR BLD AUTO: 2.6 % (ref 0.3–6.2)
ERYTHROCYTE [DISTWIDTH] IN BLOOD BY AUTOMATED COUNT: 13.2 % (ref 12.3–15.4)
ERYTHROCYTE [SEDIMENTATION RATE] IN BLOOD: 5 MM/HR (ref 0–20)
GLOBULIN UR ELPH-MCNC: 2.5 GM/DL
GLUCOSE SERPL-MCNC: 94 MG/DL (ref 65–99)
HCT VFR BLD AUTO: 43.6 % (ref 34–46.6)
HGB BLD-MCNC: 14.7 G/DL (ref 12–15.9)
HOLD SPECIMEN: NORMAL
HOLD SPECIMEN: NORMAL
IMM GRANULOCYTES # BLD AUTO: 0.02 10*3/MM3 (ref 0–0.05)
IMM GRANULOCYTES NFR BLD AUTO: 0.4 % (ref 0–0.5)
LYMPHOCYTES # BLD AUTO: 2.25 10*3/MM3 (ref 0.7–3.1)
LYMPHOCYTES NFR BLD AUTO: 41 % (ref 19.6–45.3)
MCH RBC QN AUTO: 31.7 PG (ref 26.6–33)
MCHC RBC AUTO-ENTMCNC: 33.7 G/DL (ref 31.5–35.7)
MCV RBC AUTO: 94 FL (ref 79–97)
MONOCYTES # BLD AUTO: 0.45 10*3/MM3 (ref 0.1–0.9)
MONOCYTES NFR BLD AUTO: 8.2 % (ref 5–12)
NEUTROPHILS NFR BLD AUTO: 2.61 10*3/MM3 (ref 1.7–7)
NEUTROPHILS NFR BLD AUTO: 47.4 % (ref 42.7–76)
NRBC BLD AUTO-RTO: 0 /100 WBC (ref 0–0.2)
PLATELET # BLD AUTO: 144 10*3/MM3 (ref 140–450)
PMV BLD AUTO: 9.9 FL (ref 6–12)
POTASSIUM SERPL-SCNC: 4 MMOL/L (ref 3.5–5.2)
PROT SERPL-MCNC: 6.6 G/DL (ref 6–8.5)
RBC # BLD AUTO: 4.64 10*6/MM3 (ref 3.77–5.28)
SODIUM SERPL-SCNC: 140 MMOL/L (ref 136–145)
WBC NRBC COR # BLD AUTO: 5.49 10*3/MM3 (ref 3.4–10.8)
WHOLE BLOOD HOLD COAG: NORMAL
WHOLE BLOOD HOLD SPECIMEN: NORMAL

## 2024-05-15 PROCEDURE — 96376 TX/PRO/DX INJ SAME DRUG ADON: CPT

## 2024-05-15 PROCEDURE — 96361 HYDRATE IV INFUSION ADD-ON: CPT

## 2024-05-15 PROCEDURE — 25010000002 ONDANSETRON PER 1 MG: Performed by: NURSE PRACTITIONER

## 2024-05-15 PROCEDURE — 85025 COMPLETE CBC W/AUTO DIFF WBC: CPT | Performed by: NURSE PRACTITIONER

## 2024-05-15 PROCEDURE — 85652 RBC SED RATE AUTOMATED: CPT | Performed by: NURSE PRACTITIONER

## 2024-05-15 PROCEDURE — 96374 THER/PROPH/DIAG INJ IV PUSH: CPT

## 2024-05-15 PROCEDURE — 25010000002 HEPARIN LOCK FLUSH PER 10 UNITS: Performed by: NURSE PRACTITIONER

## 2024-05-15 PROCEDURE — 25010000002 DIPHENHYDRAMINE PER 50 MG: Performed by: NURSE PRACTITIONER

## 2024-05-15 PROCEDURE — 96375 TX/PRO/DX INJ NEW DRUG ADDON: CPT

## 2024-05-15 PROCEDURE — 25010000002 HYDROMORPHONE PER 4 MG: Performed by: NURSE PRACTITIONER

## 2024-05-15 PROCEDURE — 86140 C-REACTIVE PROTEIN: CPT | Performed by: NURSE PRACTITIONER

## 2024-05-15 PROCEDURE — 25010000002 DEXAMETHASONE PER 1 MG: Performed by: NURSE PRACTITIONER

## 2024-05-15 PROCEDURE — 36415 COLL VENOUS BLD VENIPUNCTURE: CPT

## 2024-05-15 PROCEDURE — 25810000003 SODIUM CHLORIDE 0.9 % SOLUTION: Performed by: NURSE PRACTITIONER

## 2024-05-15 PROCEDURE — 80053 COMPREHEN METABOLIC PANEL: CPT | Performed by: NURSE PRACTITIONER

## 2024-05-15 PROCEDURE — 99283 EMERGENCY DEPT VISIT LOW MDM: CPT

## 2024-05-15 PROCEDURE — 25010000002 HYDROMORPHONE 1 MG/ML SOLUTION: Performed by: NURSE PRACTITIONER

## 2024-05-15 RX ORDER — DIPHENHYDRAMINE HYDROCHLORIDE 50 MG/ML
25 INJECTION INTRAMUSCULAR; INTRAVENOUS ONCE
Status: COMPLETED | OUTPATIENT
Start: 2024-05-15 | End: 2024-05-15

## 2024-05-15 RX ORDER — DEXAMETHASONE SODIUM PHOSPHATE 4 MG/ML
6 INJECTION, SOLUTION INTRA-ARTICULAR; INTRALESIONAL; INTRAMUSCULAR; INTRAVENOUS; SOFT TISSUE ONCE
Status: COMPLETED | OUTPATIENT
Start: 2024-05-15 | End: 2024-05-15

## 2024-05-15 RX ORDER — HYDROMORPHONE HYDROCHLORIDE 1 MG/ML
0.5 INJECTION, SOLUTION INTRAMUSCULAR; INTRAVENOUS; SUBCUTANEOUS ONCE
Status: COMPLETED | OUTPATIENT
Start: 2024-05-15 | End: 2024-05-15

## 2024-05-15 RX ORDER — ONDANSETRON 2 MG/ML
4 INJECTION INTRAMUSCULAR; INTRAVENOUS ONCE
Status: COMPLETED | OUTPATIENT
Start: 2024-05-15 | End: 2024-05-15

## 2024-05-15 RX ORDER — HEPARIN SODIUM (PORCINE) LOCK FLUSH IV SOLN 100 UNIT/ML 100 UNIT/ML
300 SOLUTION INTRAVENOUS ONCE
Status: COMPLETED | OUTPATIENT
Start: 2024-05-15 | End: 2024-05-15

## 2024-05-15 RX ADMIN — HYDROMORPHONE HYDROCHLORIDE 0.5 MG: 1 INJECTION, SOLUTION INTRAMUSCULAR; INTRAVENOUS; SUBCUTANEOUS at 13:27

## 2024-05-15 RX ADMIN — HYDROMORPHONE HYDROCHLORIDE 1 MG: 1 INJECTION, SOLUTION INTRAMUSCULAR; INTRAVENOUS; SUBCUTANEOUS at 12:46

## 2024-05-15 RX ADMIN — SODIUM CHLORIDE 1000 ML: 9 INJECTION, SOLUTION INTRAVENOUS at 12:45

## 2024-05-15 RX ADMIN — ONDANSETRON 4 MG: 2 INJECTION INTRAMUSCULAR; INTRAVENOUS at 12:46

## 2024-05-15 RX ADMIN — DIPHENHYDRAMINE HYDROCHLORIDE 25 MG: 50 INJECTION, SOLUTION INTRAMUSCULAR; INTRAVENOUS at 12:46

## 2024-05-15 RX ADMIN — HEPARIN 300 UNITS: 100 SYRINGE at 13:31

## 2024-05-15 RX ADMIN — DEXAMETHASONE SODIUM PHOSPHATE 6 MG: 4 INJECTION, SOLUTION INTRA-ARTICULAR; INTRALESIONAL; INTRAMUSCULAR; INTRAVENOUS; SOFT TISSUE at 12:46

## 2024-05-15 NOTE — ED PROVIDER NOTES
Subjective   History of Present Illness  Patient is a 49-year-old female presents today complaining of headache.  Patient is a longstanding history of migraines.  Patient has been under some stress due to granddaughter having issues with surgery.  Patient reports pain is moderate to severe.  Patient denies any alleviating worsening factors.  Patient has had nausea vomiting.    Headache  Associated symptoms: nausea and vomiting        Review of Systems   Constitutional: Negative.    Eyes: Negative.    Respiratory: Negative.     Cardiovascular: Negative.    Gastrointestinal:  Positive for nausea and vomiting.   Endocrine: Negative.    Genitourinary: Negative.    Musculoskeletal: Negative.    Skin: Negative.    Allergic/Immunologic: Negative.    Neurological:  Positive for headaches.   Hematological: Negative.    Psychiatric/Behavioral: Negative.         Past Medical History:   Diagnosis Date    Hoyos's disease     Bipolar 1 disorder     Constipation     DDD (degenerative disc disease), cervical 05/29/2017    Fibromyalgia     IBS (irritable bowel syndrome)     Meningioma     Migraine     PONV (postoperative nausea and vomiting)     PTSD (post-traumatic stress disorder)     Rectal bleeding        Allergies   Allergen Reactions    Ativan [Lorazepam] Hallucinations     confusion    Sulfa Antibiotics Shortness Of Breath and Swelling    Sulfa Antibiotics Anaphylaxis    Reglan [Metoclopramide] Angioedema    Compazine [Prochlorperazine Edisylate] Hives    Demerol [Meperidine] Hives    Droperidol Itching    Metoclopramide Swelling    Toradol [Ketorolac Tromethamine] Hives and Itching    Toradol [Ketorolac Tromethamine] Hives    Zosyn [Piperacillin Sod-Tazobactam So] Hives       Past Surgical History:   Procedure Laterality Date    ANAL SCOPE N/A 7/28/2016    Procedure: ANAL SCOPE;  Surgeon: Kael Lopez MD;  Location: [facility];  Service:     APPENDECTOMY      COLONOSCOPY N/A 6/30/2016    Procedure: COLONOSCOPY   CPTCODE:37991;  Surgeon: Jose Antonio Belle III, MD;  Location: [facility] OR;  Service:     COLONOSCOPY N/A 2016    Procedure: COLONOSCOPY (54950) CPT;  Surgeon: Jose Antonio Belle III, MD;  Location: [facility] OR;  Service:     CYSTOSCOPY RETROGRADE PYELOGRAM Bilateral 2017    Procedure: CYSTOSCOPY RETROGRADE PYELOGRAM;  Surgeon: Mu Blunt MD;  Location: [facility] OR;  Service:     ENDOSCOPY N/A 2016    Procedure: ESOPHAGOGASTRODUODENOSCOPY WITH BIOPSY  CPTCODE:16274;  Surgeon: Jose Antonio Belle III, MD;  Location: [facility] OR;  Service:     HEMORRHOIDECTOMY N/A 2016    Procedure: HEMORRHOID STAPLING;  Surgeon: Kael Lopez MD;  Location: [facility] OR;  Service:     HYSTERECTOMY      KNEE SURGERY      LAPAROSCOPIC SALPINGOOPHERECTOMY      PORTACATH PLACEMENT N/A 2017    Procedure: INSERTION OF PORTACATH;  Surgeon: Celso Arredondo MD;  Location: [facility] OR;  Service:     SHOULDER SURGERY      3 times       Family History   Problem Relation Age of Onset    Crohn's disease Other     Hypertension Other     Diabetes Other     Irritable bowel syndrome Other     No Known Problems Father     No Known Problems Mother        Social History     Socioeconomic History    Marital status:    Tobacco Use    Smoking status: Former     Current packs/day: 0.00     Average packs/day: 1 pack/day for 20.0 years (20.0 ttl pk-yrs)     Types: Cigarettes     Start date: 1995     Quit date: 2015     Years since quittin.5     Passive exposure: Past    Smokeless tobacco: Never   Vaping Use    Vaping status: Never Used   Substance and Sexual Activity    Alcohol use: No    Drug use: Yes     Types: Marijuana     Comment: for pain    Sexual activity: Defer     Birth control/protection: Surgical           Objective   Physical Exam  Vitals and nursing note reviewed.   Constitutional:       Appearance: She is well-developed.   HENT:      Head: Normocephalic.      Right Ear: External ear normal.       Left Ear: External ear normal.   Eyes:      Conjunctiva/sclera: Conjunctivae normal.      Pupils: Pupils are equal, round, and reactive to light.   Cardiovascular:      Rate and Rhythm: Normal rate and regular rhythm.      Heart sounds: Normal heart sounds.   Pulmonary:      Effort: Pulmonary effort is normal.      Breath sounds: Normal breath sounds.   Abdominal:      General: Bowel sounds are normal.      Palpations: Abdomen is soft.   Musculoskeletal:         General: Normal range of motion.      Cervical back: Normal range of motion and neck supple.   Skin:     General: Skin is warm and dry.      Capillary Refill: Capillary refill takes less than 2 seconds.   Neurological:      Mental Status: She is alert and oriented to person, place, and time.   Psychiatric:         Behavior: Behavior normal.         Thought Content: Thought content normal.         Procedures           ED Course                                             Medical Decision Making  Amount and/or Complexity of Data Reviewed  Labs: ordered.    Risk  Prescription drug management.        Final diagnoses:   Acute nonintractable headache, unspecified headache type       ED Disposition  ED Disposition       ED Disposition   Discharge    Condition   Stable    Comment   --               Mabel Patterson, MIGDALIA  [address] [address]  [phone]    Schedule an appointment as soon as possible for a visit   For further evaluation         Medication List      No changes were made to your prescriptions during this visit.            Ja Suarez, APRN  05/15/24 1926     Yes

## 2024-05-15 NOTE — ED NOTES
MEDICAL SCREENING:    Reason for Visit: HA    Patient initially seen in triage.  The patient was advised further evaluation and diagnostic testing will be needed, some of the treatment and testing will be initiated in the lobby in order to begin the process.  The patient will be returned to the waiting area for the time being and possibly be re-assessed by a subsequent ED provider.  The patient will be brought back to the treatment area in as timely manner as possible.     Cele Figueroa, APRN  05/15/24 1149

## 2024-06-06 ENCOUNTER — OFFICE VISIT (OUTPATIENT)
Dept: UROLOGY | Facility: CLINIC | Age: 49
End: 2024-06-06
Payer: COMMERCIAL

## 2024-06-06 VITALS
HEIGHT: 68 IN | DIASTOLIC BLOOD PRESSURE: 72 MMHG | BODY MASS INDEX: 16.09 KG/M2 | SYSTOLIC BLOOD PRESSURE: 118 MMHG | HEART RATE: 74 BPM | WEIGHT: 106.2 LBS

## 2024-06-06 DIAGNOSIS — R35.0 FREQUENCY OF MICTURITION: ICD-10-CM

## 2024-06-06 DIAGNOSIS — N35.028 OTHER POST-TRAUMATIC URETHRAL STRICTURE, FEMALE: Primary | ICD-10-CM

## 2024-06-06 DIAGNOSIS — N23 RENAL COLIC: ICD-10-CM

## 2024-06-06 RX ORDER — PROMETHAZINE HYDROCHLORIDE 25 MG/1
25 TABLET ORAL EVERY 6 HOURS PRN
Qty: 21 TABLET | Refills: 5 | Status: SHIPPED | OUTPATIENT
Start: 2024-06-06

## 2024-06-06 RX ORDER — OXYCODONE AND ACETAMINOPHEN 10; 325 MG/1; MG/1
1 TABLET ORAL EVERY 6 HOURS PRN
Qty: 20 TABLET | Refills: 0 | Status: SHIPPED | OUTPATIENT
Start: 2024-06-06

## 2024-06-06 RX ORDER — GENTAMICIN 40 MG/ML
80 INJECTION, SOLUTION INTRAMUSCULAR; INTRAVENOUS ONCE
Status: COMPLETED | OUTPATIENT
Start: 2024-06-06 | End: 2024-06-06

## 2024-06-06 RX ADMIN — GENTAMICIN 80 MG: 40 INJECTION, SOLUTION INTRAMUSCULAR; INTRAVENOUS at 11:37

## 2024-06-06 NOTE — PROGRESS NOTES
Chief Complaint:      Chief Complaint   Patient presents with    Aftercare following surgery of the genitourinary system       HPI:   49 y.o. female here for urethral dilation very well-known to us.  Past Medical History:     Past Medical History:   Diagnosis Date    Hoyos's disease     Bipolar 1 disorder     Constipation     DDD (degenerative disc disease), cervical 05/29/2017    Fibromyalgia     IBS (irritable bowel syndrome)     Meningioma     Migraine     PONV (postoperative nausea and vomiting)     PTSD (post-traumatic stress disorder)     Rectal bleeding        Current Meds:     Current Outpatient Medications   Medication Sig Dispense Refill    albuterol (PROVENTIL HFA;VENTOLIN HFA) 108 (90 Base) MCG/ACT inhaler Inhale 2 puffs 2 (Two) Times a Day.      Azelastine HCl 137 MCG/SPRAY solution 1 spray into the nostril(s) as directed by provider As Needed (Allergies).      busPIRone (BUSPAR) 15 MG tablet Take 1 tablet by mouth 3 (Three) Times a Day.      diclofenac (VOLTAREN) 1 % gel gel       divalproex (DEPAKOTE) 500 MG DR tablet Take 1 tablet by mouth 3 (Three) Times a Day. 90 tablet 2    docusate sodium (COLACE) 100 MG capsule Take 1 capsule by mouth 2 (Two) Times a Day. 20 capsule 0    fluticasone (VERAMYST) 27.5 MCG/SPRAY nasal spray 2 sprays into the nostril(s) as directed by provider Daily.      magnesium 30 MG tablet Take 1 tablet by mouth Daily. 10 tablet 0    megestrol (MEGACE) 20 MG tablet Take 1 tablet by mouth Daily. 30 tablet 3    methylPREDNISolone (MEDROL) 4 MG dose pack Take as directed on package instructions. 21 tablet 0    metroNIDAZOLE (FLAGYL) 500 MG tablet Take 1 tablet by mouth 3 (Three) Times a Day. 30 tablet 0    montelukast (SINGULAIR) 10 MG tablet Take 1 tablet by mouth Every Night.      nitrofurantoin, macrocrystal-monohydrate, (MACROBID) 100 MG capsule Take 1 capsule by mouth 2 (Two) Times a Day. 13 capsule 0    ondansetron ODT (ZOFRAN-ODT) 4 MG disintegrating tablet Place 2 tablets  on the tongue Every 8 (Eight) Hours As Needed for Nausea or Vomiting. 30 tablet 0    oxyCODONE-acetaminophen (Percocet)  MG per tablet Take 1 tablet by mouth Every 6 (Six) Hours As Needed for Moderate Pain. 20 tablet 0    pantoprazole (PROTONIX) 40 MG EC tablet Take 1 tablet by mouth 2 (Two) Times a Day As Needed.      phenazopyridine (PYRIDIUM) 100 MG tablet Take 1 tablet by mouth 3 (Three) Times a Day As Needed for Bladder Spasms. 30 tablet 1    polyethylene glycol (MIRALAX) 17 GM/SCOOP powder Take 17 g by mouth Daily. 225 g 0    potassium chloride (KLOR-CON M20) 20 MEQ CR tablet Take 1 tablet by mouth Daily. 10 tablet 0    promethazine (PHENERGAN) 25 MG tablet Take 1 tablet by mouth Every 6 (Six) Hours As Needed for Nausea or Vomiting. 21 tablet 5    promethazine (PHENERGAN) 25 MG tablet Take 1 tablet by mouth Every 6 (Six) Hours As Needed for Nausea or Vomiting. 28 tablet 4    sertraline (ZOLOFT) 100 MG tablet Take 1 tablet by mouth 2 (Two) Times a Day.      SUMAtriptan (IMITREX) 6 MG/0.5ML injection Inject prescribed dose at onset of headache. May repeat dose one time in 1 hour(s) if headache not relieved.      traMADol (ULTRAM) 50 MG tablet       terazosin (HYTRIN) 2 MG capsule Take 1 capsule by mouth Every Night for 14 days. 14 capsule 0     No current facility-administered medications for this visit.        Allergies:      Allergies   Allergen Reactions    Ativan [Lorazepam] Hallucinations     confusion    Sulfa Antibiotics Shortness Of Breath and Swelling    Sulfa Antibiotics Anaphylaxis    Reglan [Metoclopramide] Angioedema    Compazine [Prochlorperazine Edisylate] Hives    Demerol [Meperidine] Hives    Droperidol Itching    Metoclopramide Swelling    Toradol [Ketorolac Tromethamine] Hives and Itching    Toradol [Ketorolac Tromethamine] Hives    Zosyn [Piperacillin Sod-Tazobactam So] Hives        Past Surgical History:     Past Surgical History:   Procedure Laterality Date    ANAL SCOPE N/A 7/28/2016     Procedure: ANAL SCOPE;  Surgeon: Kael Lopez MD;  Location: [facility];  Service:     APPENDECTOMY      COLONOSCOPY N/A 2016    Procedure: COLONOSCOPY  CPTCODE:96157;  Surgeon: Jose Antonio Belle III, MD;  Location: [facility];  Service:     COLONOSCOPY N/A 2016    Procedure: COLONOSCOPY (32033) CPT;  Surgeon: Jose Antonio Belle III, MD;  Location: [facility];  Service:     CYSTOSCOPY RETROGRADE PYELOGRAM Bilateral 2017    Procedure: CYSTOSCOPY RETROGRADE PYELOGRAM;  Surgeon: Mu Blunt MD;  Location: [facility];  Service:     ENDOSCOPY N/A 2016    Procedure: ESOPHAGOGASTRODUODENOSCOPY WITH BIOPSY  CPTCODE:04345;  Surgeon: Jose Antonio Belle III, MD;  Location: [facility];  Service:     HEMORRHOIDECTOMY N/A 2016    Procedure: HEMORRHOID STAPLING;  Surgeon: Kael Lopez MD;  Location: [facility];  Service:     HYSTERECTOMY      KNEE SURGERY      LAPAROSCOPIC SALPINGOOPHERECTOMY      PORTACATH PLACEMENT N/A 2017    Procedure: INSERTION OF PORTACATH;  Surgeon: Celso Arredondo MD;  Location: [facility];  Service:     SHOULDER SURGERY      3 times       Social History:     Social History     Socioeconomic History    Marital status:    Tobacco Use    Smoking status: Former     Current packs/day: 0.00     Average packs/day: 1 pack/day for 20.0 years (20.0 ttl pk-yrs)     Types: Cigarettes     Start date: 1995     Quit date: 2015     Years since quittin.6     Passive exposure: Past    Smokeless tobacco: Never   Vaping Use    Vaping status: Never Used   Substance and Sexual Activity    Alcohol use: No    Drug use: Yes     Types: Marijuana     Comment: for pain    Sexual activity: Defer     Birth control/protection: Surgical       Family History:     Family History   Problem Relation Age of Onset    Crohn's disease Other     Hypertension Other     Diabetes Other     Irritable bowel syndrome Other     No Known Problems Father     No Known Problems Mother         Review of Systems:     Review of Systems   Constitutional: Negative.  Negative for activity change, appetite change, chills, diaphoresis, fatigue and unexpected weight change.   HENT:  Negative for congestion, dental problem, drooling, ear discharge, ear pain, facial swelling, hearing loss, mouth sores, nosebleeds, postnasal drip, rhinorrhea, sinus pressure, sneezing, sore throat, tinnitus, trouble swallowing and voice change.    Eyes: Negative.  Negative for photophobia, pain, discharge, redness, itching and visual disturbance.   Respiratory: Negative.  Negative for apnea, cough, choking, chest tightness, shortness of breath, wheezing and stridor.    Cardiovascular: Negative.  Negative for chest pain, palpitations and leg swelling.   Gastrointestinal: Negative.  Negative for abdominal distention, abdominal pain, anal bleeding, blood in stool, constipation, diarrhea, nausea, rectal pain and vomiting.   Endocrine: Negative.  Negative for cold intolerance, heat intolerance, polydipsia, polyphagia and polyuria.   Musculoskeletal: Negative.  Negative for arthralgias, back pain, gait problem, joint swelling, myalgias, neck pain and neck stiffness.   Skin: Negative.  Negative for color change, pallor, rash and wound.   Allergic/Immunologic: Negative.  Negative for environmental allergies, food allergies and immunocompromised state.   Neurological: Negative.  Negative for dizziness, tremors, seizures, syncope, facial asymmetry, speech difficulty, weakness, light-headedness, numbness and headaches.   Hematological: Negative.  Negative for adenopathy. Does not bruise/bleed easily.   Psychiatric/Behavioral:  Negative for agitation, behavioral problems, confusion, decreased concentration, dysphoric mood, hallucinations, self-injury, sleep disturbance and suicidal ideas. The patient is not nervous/anxious and is not hyperactive.    All other systems reviewed and are negative.      Physical Exam:     Physical  Exam  Constitutional:       Appearance: She is well-developed.   HENT:      Head: Normocephalic and atraumatic.      Right Ear: External ear normal.      Left Ear: External ear normal.   Eyes:      Conjunctiva/sclera: Conjunctivae normal.      Pupils: Pupils are equal, round, and reactive to light.   Cardiovascular:      Rate and Rhythm: Normal rate and regular rhythm.      Heart sounds: Normal heart sounds.   Pulmonary:      Effort: Pulmonary effort is normal.      Breath sounds: Normal breath sounds.   Abdominal:      General: Bowel sounds are normal. There is no distension.      Palpations: Abdomen is soft. There is no mass.      Tenderness: There is no abdominal tenderness. There is no guarding or rebound.   Genitourinary:     Vagina: No vaginal discharge.   Musculoskeletal:         General: Normal range of motion.   Skin:     General: Skin is warm and dry.   Neurological:      Mental Status: She is alert.      Deep Tendon Reflexes: Reflexes are normal and symmetric.   Psychiatric:         Behavior: Behavior normal.         Thought Content: Thought content normal.         Judgment: Judgment normal.         I have reviewed the following portions of the patient's history: Allergies, current medications, past family history, past medical history, past social history, past surgical history, problem list, and ROS and confirm it is accurate.    Recent Image (CT and/or KUB):      CT Abdomen and Pelvis: No results found for this or any previous visit.       CT Stone Protocol: Results for orders placed during the hospital encounter of 10/10/23    CT Abdomen Pelvis Stone Protocol    Narrative  CT ABDOMEN PELVIS STONE PROTOCOL-: 10/10/2023 9:19 PM    REASON FOR EXAM: Flank pain, kidney stone suspected LT FLANK PAIN.    TECHNIQUE: Noncontrast 4 mm axial sections with sagittal and coronal  reformats.    COMPARISON: 3/16/2023.    FINDINGS: There is focal bronchiectasis within the right lower lobe  (image 4/59), now with  probable mucous plugging.  Noting the noncontrast  technique, the liver is unremarkable.  No focal liver lesions are  identified.  Best demonstrated on narrow windows, there is the  suggestion of cholelithiasis dependently within the gallbladder.  There  is no biliary ductal dilatation.  The spleen is at the upper limits of  normal in size.  The pancreas, adrenal glands and kidneys are within  normal limits, noting a probable cyst within the lower pole of the right  kidney (image 3/37).  There is no nephrolithiasis.  There is no abnormal  dilatation of either renal collecting system or ureter.  There is no  ureterolithiasis.  The abdominal aorta is normal in caliber, noting mild  vascular calcification.  There is a surgical anastomosis at the  rectoanal junction.  No complication is evident.  There are no findings  of bowel obstruction.  Noting the lack of enteric contrast material,  there is no abnormal bowel wall thickening.  The appendix is not  identified, however there are no findings of appendicitis.  There is no  free intraperitoneal fluid.  The uterus is absent.  The ovaries are not  identified.  There are no abnormal adnexal masses.  There is no lymph  node enlargement.  The bladder is unremarkable.  There is an old  fracture of L2.    Impression  1.  No acute abnormality of the abdomen or pelvis.  There is no  nephrolithiasis or ureterolithiasis.  2.  Incidental findings, as above, similar in appearance to 3/16/2023.      This report was finalized on 10/10/2023 11:05 PM by Steph Fabian MD.       KUB: Results for orders placed during the hospital encounter of 01/10/23    XR Abdomen KUB    Narrative  EXAM:  XR Abdomen, 1 View    EXAM DATE:  1/10/2023 12:05 PM    CLINICAL HISTORY:  flank pain    TECHNIQUE:  Frontal supine view of the abdomen/pelvis.    COMPARISON:  10/01/2022    FINDINGS:  Gastrointestinal tract:  Unremarkable.  No dilation.  Organs:  No stones identified along the expected course of  ureters.  Bones/joints:  Unremarkable.  Soft tissues:  Surgical staples in the lower pelvis.  Vasculature:  Stable phleboliths lower pelvis.    Impression  1.  No acute findings radiographically evident.  2.  No radiographic evidence of renal or ureteral stones.    This report was finalized on 1/10/2023 12:47 PM by Dr. Ankit Naik MD.       Labs (past 3 months):      Admission on 05/15/2024, Discharged on 05/15/2024   Component Date Value Ref Range Status    Glucose 05/15/2024 94  65 - 99 mg/dL Final    BUN 05/15/2024 9  6 - 20 mg/dL Final    Creatinine 05/15/2024 0.55 (L)  0.57 - 1.00 mg/dL Final    Sodium 05/15/2024 140  136 - 145 mmol/L Final    Potassium 05/15/2024 4.0  3.5 - 5.2 mmol/L Final    Chloride 05/15/2024 103  98 - 107 mmol/L Final    CO2 05/15/2024 29.5 (H)  22.0 - 29.0 mmol/L Final    Calcium 05/15/2024 8.9  8.6 - 10.5 mg/dL Final    Total Protein 05/15/2024 6.6  6.0 - 8.5 g/dL Final    Albumin 05/15/2024 4.1  3.5 - 5.2 g/dL Final    ALT (SGPT) 05/15/2024 49 (H)  1 - 33 U/L Final    AST (SGOT) 05/15/2024 40 (H)  1 - 32 U/L Final    Alkaline Phosphatase 05/15/2024 96  39 - 117 U/L Final    Total Bilirubin 05/15/2024 0.5  0.0 - 1.2 mg/dL Final    Globulin 05/15/2024 2.5  gm/dL Final    A/G Ratio 05/15/2024 1.6  g/dL Final    BUN/Creatinine Ratio 05/15/2024 16.4  7.0 - 25.0 Final    Anion Gap 05/15/2024 7.5  5.0 - 15.0 mmol/L Final    eGFR 05/15/2024 112.5  >60.0 mL/min/1.73 Final    Sed Rate 05/15/2024 5  0 - 20 mm/hr Final    C-Reactive Protein 05/15/2024 <0.30  0.00 - 0.50 mg/dL Final    WBC 05/15/2024 5.49  3.40 - 10.80 10*3/mm3 Final    RBC 05/15/2024 4.64  3.77 - 5.28 10*6/mm3 Final    Hemoglobin 05/15/2024 14.7  12.0 - 15.9 g/dL Final    Hematocrit 05/15/2024 43.6  34.0 - 46.6 % Final    MCV 05/15/2024 94.0  79.0 - 97.0 fL Final    MCH 05/15/2024 31.7  26.6 - 33.0 pg Final    MCHC 05/15/2024 33.7  31.5 - 35.7 g/dL Final    RDW 05/15/2024 13.2  12.3 - 15.4 % Final    RDW-SD 05/15/2024 45.6   37.0 - 54.0 fl Final    MPV 05/15/2024 9.9  6.0 - 12.0 fL Final    Platelets 05/15/2024 144  140 - 450 10*3/mm3 Final    Neutrophil % 05/15/2024 47.4  42.7 - 76.0 % Final    Lymphocyte % 05/15/2024 41.0  19.6 - 45.3 % Final    Monocyte % 05/15/2024 8.2  5.0 - 12.0 % Final    Eosinophil % 05/15/2024 2.6  0.3 - 6.2 % Final    Basophil % 05/15/2024 0.4  0.0 - 1.5 % Final    Immature Grans % 05/15/2024 0.4  0.0 - 0.5 % Final    Neutrophils, Absolute 05/15/2024 2.61  1.70 - 7.00 10*3/mm3 Final    Lymphocytes, Absolute 05/15/2024 2.25  0.70 - 3.10 10*3/mm3 Final    Monocytes, Absolute 05/15/2024 0.45  0.10 - 0.90 10*3/mm3 Final    Eosinophils, Absolute 05/15/2024 0.14  0.00 - 0.40 10*3/mm3 Final    Basophils, Absolute 05/15/2024 0.02  0.00 - 0.20 10*3/mm3 Final    Immature Grans, Absolute 05/15/2024 0.02  0.00 - 0.05 10*3/mm3 Final    nRBC 05/15/2024 0.0  0.0 - 0.2 /100 WBC Final    Extra Tube 05/15/2024 Hold for add-ons.   Final    Auto resulted.    Extra Tube 05/15/2024 hold for add-on   Final    Auto resulted    Extra Tube 05/15/2024 Hold for add-ons.   Final    Auto resulted.    Extra Tube 05/15/2024 Hold for add-ons.   Final    Auto resulted   Admission on 04/19/2024, Discharged on 04/19/2024   Component Date Value Ref Range Status    Glucose 04/19/2024 102 (H)  65 - 99 mg/dL Final    BUN 04/19/2024 6  6 - 20 mg/dL Final    Creatinine 04/19/2024 0.40 (L)  0.57 - 1.00 mg/dL Final    Sodium 04/19/2024 141  136 - 145 mmol/L Final    Potassium 04/19/2024 3.0 (L)  3.5 - 5.2 mmol/L Final    Slight hemolysis detected by analyzer. Result may be falsely elevated.    Chloride 04/19/2024 109 (H)  98 - 107 mmol/L Final    CO2 04/19/2024 21.0 (L)  22.0 - 29.0 mmol/L Final    Calcium 04/19/2024 7.3 (L)  8.6 - 10.5 mg/dL Final    Total Protein 04/19/2024 5.7 (L)  6.0 - 8.5 g/dL Final    Albumin 04/19/2024 3.6  3.5 - 5.2 g/dL Final    ALT (SGPT) 04/19/2024 20  1 - 33 U/L Final    AST (SGOT) 04/19/2024 21  1 - 32 U/L Final     Alkaline Phosphatase 04/19/2024 76  39 - 117 U/L Final    Total Bilirubin 04/19/2024 0.5  0.0 - 1.2 mg/dL Final    Globulin 04/19/2024 2.1  gm/dL Final    A/G Ratio 04/19/2024 1.7  g/dL Final    BUN/Creatinine Ratio 04/19/2024 15.0  7.0 - 25.0 Final    Anion Gap 04/19/2024 11.0  5.0 - 15.0 mmol/L Final    eGFR 04/19/2024 121.5  >60.0 mL/min/1.73 Final    Sed Rate 04/19/2024 1  0 - 20 mm/hr Final    C-Reactive Protein 04/19/2024 <0.30  0.00 - 0.50 mg/dL Final    Valproic Acid 04/19/2024 <2.8 (L)  50.0 - 125.0 mcg/mL Final    WBC 04/19/2024 7.24  3.40 - 10.80 10*3/mm3 Final    RBC 04/19/2024 4.37  3.77 - 5.28 10*6/mm3 Final    Hemoglobin 04/19/2024 13.7  12.0 - 15.9 g/dL Final    Hematocrit 04/19/2024 41.9  34.0 - 46.6 % Final    MCV 04/19/2024 95.9  79.0 - 97.0 fL Final    MCH 04/19/2024 31.4  26.6 - 33.0 pg Final    MCHC 04/19/2024 32.7  31.5 - 35.7 g/dL Final    RDW 04/19/2024 13.2  12.3 - 15.4 % Final    RDW-SD 04/19/2024 46.7  37.0 - 54.0 fl Final    MPV 04/19/2024 10.1  6.0 - 12.0 fL Final    Platelets 04/19/2024 170  140 - 450 10*3/mm3 Final    Neutrophil % 04/19/2024 55.6  42.7 - 76.0 % Final    Lymphocyte % 04/19/2024 35.9  19.6 - 45.3 % Final    Monocyte % 04/19/2024 5.7  5.0 - 12.0 % Final    Eosinophil % 04/19/2024 1.5  0.3 - 6.2 % Final    Basophil % 04/19/2024 0.3  0.0 - 1.5 % Final    Immature Grans % 04/19/2024 1.0 (H)  0.0 - 0.5 % Final    Neutrophils, Absolute 04/19/2024 4.03  1.70 - 7.00 10*3/mm3 Final    Lymphocytes, Absolute 04/19/2024 2.60  0.70 - 3.10 10*3/mm3 Final    Monocytes, Absolute 04/19/2024 0.41  0.10 - 0.90 10*3/mm3 Final    Eosinophils, Absolute 04/19/2024 0.11  0.00 - 0.40 10*3/mm3 Final    Basophils, Absolute 04/19/2024 0.02  0.00 - 0.20 10*3/mm3 Final    Immature Grans, Absolute 04/19/2024 0.07 (H)  0.00 - 0.05 10*3/mm3 Final    nRBC 04/19/2024 0.0  0.0 - 0.2 /100 WBC Final    Magnesium 04/19/2024 1.5 (L)  1.6 - 2.6 mg/dL Final        Procedure:   Urethral dilation-after an  appropriate informed consent, the patient was brought to the procedure suite.  The urethra was gently anesthetized with 10 mL of 2% viscous Xylocaine jelly.  After an adequate period of topical anesthesia I went ahead and the urethra was gently anesthetized and dilated with Pitkin sounds from 16 to 30 Uzbek sequentially without complication. The patient was given gentamicin as prophylaxis with 80 mg.    Assessment/Plan:   Posttraumatic urethral stricture status post dilation.  Narcotic pain medication-patient has significant acute pain that I believe would be an indication for the use of narcotic pain medication.  I discussed the significant risks of pain medication and the fact that this will be a short only option and I will give her no more than a three-day supply of pain medication, I will not plan long-term medication, and that this will be sent to a pain clinic if it at all becomes necessary.  We discussed signing a pain medication agreement and the fact that we're going to run a state [place] review to be sure the patient is not getting pain medication from elsewhere.  If this is the case, we will not give pain medication as part of the patient's treatment plan of there being prescribed a controlled substance with potential for abuse.  This patient has been well aware of the appropriate dose of such medications including the risks for somnolence, limited ability to drive and/or safety and the significant potential for overdose.  It has been made clear that these medications are for the prescribed patient only without concomitant use of alcohol or other substance unless prescribed by the medical provider.  Has completed prescribing agreement detailing the terms of continue prescribing him a controlled substance including monitoring [place] Alberto reports, the possibility of urine drug screens, and pill counts.  The patient is aware that we review LUIS ALBERTO reports on a regular basis and scan them into the chart.   History and physical examination exhibited continued safe and appropriate use of controlled substances. We also discussed the fact that the new [place] legislation allows only a three-day prescription for pain medication.  In this situation he will be referred to a chronic pain clinic.  Nausea and vomiting-patient has significant nausea and vomiting as a consequence of this.  We recommended Phenergan.  We also discussed the use of Zofran and the sedating side effects of Phenergan as well.              This document has been electronically signed by VERENICE FINK MD June 6, 2024 09:40 EDT    Dictated Utilizing Dragon Dictation: Part of this note may be an electronic transcription/translation of spoken language to printed text using the Dragon Dictation System.

## 2024-07-02 ENCOUNTER — OFFICE VISIT (OUTPATIENT)
Dept: UROLOGY | Facility: CLINIC | Age: 49
End: 2024-07-02
Payer: COMMERCIAL

## 2024-07-02 VITALS
SYSTOLIC BLOOD PRESSURE: 116 MMHG | WEIGHT: 108 LBS | BODY MASS INDEX: 16.37 KG/M2 | HEIGHT: 68 IN | DIASTOLIC BLOOD PRESSURE: 74 MMHG | HEART RATE: 72 BPM

## 2024-07-02 DIAGNOSIS — N23 RENAL COLIC: ICD-10-CM

## 2024-07-02 DIAGNOSIS — Z48.816 AFTERCARE FOLLOWING SURGERY OF THE GENITOURINARY SYSTEM: Primary | ICD-10-CM

## 2024-07-02 DIAGNOSIS — N35.028 OTHER POST-TRAUMATIC URETHRAL STRICTURE, FEMALE: ICD-10-CM

## 2024-07-02 RX ORDER — OXYCODONE AND ACETAMINOPHEN 10; 325 MG/1; MG/1
1 TABLET ORAL EVERY 6 HOURS PRN
Qty: 20 TABLET | Refills: 0 | Status: SHIPPED | OUTPATIENT
Start: 2024-07-02

## 2024-07-02 RX ORDER — PROMETHAZINE HYDROCHLORIDE 25 MG/1
25 TABLET ORAL EVERY 6 HOURS PRN
Qty: 28 TABLET | Refills: 4 | Status: SHIPPED | OUTPATIENT
Start: 2024-07-02

## 2024-07-02 RX ORDER — GENTAMICIN 40 MG/ML
80 INJECTION, SOLUTION INTRAMUSCULAR; INTRAVENOUS ONCE
Status: COMPLETED | OUTPATIENT
Start: 2024-07-02 | End: 2024-07-02

## 2024-07-02 RX ADMIN — GENTAMICIN 80 MG: 40 INJECTION, SOLUTION INTRAMUSCULAR; INTRAVENOUS at 09:17

## 2024-07-08 ENCOUNTER — HOSPITAL ENCOUNTER (EMERGENCY)
Facility: HOSPITAL | Age: 49
Discharge: HOME | End: 2024-07-08
Attending: STUDENT IN AN ORGANIZED HEALTH CARE EDUCATION/TRAINING PROGRAM
Payer: COMMERCIAL

## 2024-07-08 VITALS
TEMPERATURE: 97.9 F | OXYGEN SATURATION: 99 % | RESPIRATION RATE: 18 BRPM | BODY MASS INDEX: 16.37 KG/M2 | WEIGHT: 108.03 LBS | HEART RATE: 90 BPM | SYSTOLIC BLOOD PRESSURE: 148 MMHG | HEIGHT: 68 IN | DIASTOLIC BLOOD PRESSURE: 75 MMHG

## 2024-07-08 DIAGNOSIS — G43.909 MIGRAINE WITHOUT STATUS MIGRAINOSUS, NOT INTRACTABLE, UNSPECIFIED MIGRAINE TYPE: Primary | ICD-10-CM

## 2024-07-08 PROCEDURE — 25010000002 DIPHENHYDRAMINE PER 50 MG: Performed by: PHYSICIAN ASSISTANT

## 2024-07-08 PROCEDURE — 25010000002 HYDROMORPHONE PER 4 MG: Performed by: STUDENT IN AN ORGANIZED HEALTH CARE EDUCATION/TRAINING PROGRAM

## 2024-07-08 PROCEDURE — 96374 THER/PROPH/DIAG INJ IV PUSH: CPT

## 2024-07-08 PROCEDURE — 25010000002 HEPARIN LOCK FLUSH PER 10 UNITS: Performed by: STUDENT IN AN ORGANIZED HEALTH CARE EDUCATION/TRAINING PROGRAM

## 2024-07-08 PROCEDURE — 96376 TX/PRO/DX INJ SAME DRUG ADON: CPT

## 2024-07-08 PROCEDURE — 96375 TX/PRO/DX INJ NEW DRUG ADDON: CPT

## 2024-07-08 PROCEDURE — 25810000003 SODIUM CHLORIDE 0.9 % SOLUTION: Performed by: PHYSICIAN ASSISTANT

## 2024-07-08 PROCEDURE — 25010000002 HYDROMORPHONE 1 MG/ML SOLUTION: Performed by: STUDENT IN AN ORGANIZED HEALTH CARE EDUCATION/TRAINING PROGRAM

## 2024-07-08 PROCEDURE — 25010000002 DEXAMETHASONE SODIUM PHOSPHATE 10 MG/ML SOLUTION: Performed by: PHYSICIAN ASSISTANT

## 2024-07-08 PROCEDURE — 25010000002 ONDANSETRON PER 1 MG: Performed by: PHYSICIAN ASSISTANT

## 2024-07-08 PROCEDURE — 99283 EMERGENCY DEPT VISIT LOW MDM: CPT

## 2024-07-08 RX ORDER — ONDANSETRON 2 MG/ML
4 INJECTION INTRAMUSCULAR; INTRAVENOUS ONCE
Status: COMPLETED | OUTPATIENT
Start: 2024-07-08 | End: 2024-07-08

## 2024-07-08 RX ORDER — HEPARIN SODIUM (PORCINE) LOCK FLUSH IV SOLN 100 UNIT/ML 100 UNIT/ML
300 SOLUTION INTRAVENOUS ONCE
Status: COMPLETED | OUTPATIENT
Start: 2024-07-08 | End: 2024-07-08

## 2024-07-08 RX ORDER — DEXAMETHASONE SODIUM PHOSPHATE 10 MG/ML
6 INJECTION, SOLUTION INTRAMUSCULAR; INTRAVENOUS ONCE
Status: COMPLETED | OUTPATIENT
Start: 2024-07-08 | End: 2024-07-08

## 2024-07-08 RX ORDER — SODIUM CHLORIDE 0.9 % (FLUSH) 0.9 %
10 SYRINGE (ML) INJECTION AS NEEDED
Status: DISCONTINUED | OUTPATIENT
Start: 2024-07-08 | End: 2024-07-08 | Stop reason: HOSPADM

## 2024-07-08 RX ORDER — HYDROMORPHONE HYDROCHLORIDE 1 MG/ML
0.5 INJECTION, SOLUTION INTRAMUSCULAR; INTRAVENOUS; SUBCUTANEOUS ONCE
Status: COMPLETED | OUTPATIENT
Start: 2024-07-08 | End: 2024-07-08

## 2024-07-08 RX ORDER — DIPHENHYDRAMINE HYDROCHLORIDE 50 MG/ML
25 INJECTION INTRAMUSCULAR; INTRAVENOUS ONCE
Status: COMPLETED | OUTPATIENT
Start: 2024-07-08 | End: 2024-07-08

## 2024-07-08 RX ADMIN — DIPHENHYDRAMINE HYDROCHLORIDE 25 MG: 50 INJECTION, SOLUTION INTRAMUSCULAR; INTRAVENOUS at 11:27

## 2024-07-08 RX ADMIN — HEPARIN 300 UNITS: 100 SYRINGE at 12:42

## 2024-07-08 RX ADMIN — HYDROMORPHONE HYDROCHLORIDE 0.5 MG: 1 INJECTION, SOLUTION INTRAMUSCULAR; INTRAVENOUS; SUBCUTANEOUS at 12:41

## 2024-07-08 RX ADMIN — HYDROMORPHONE HYDROCHLORIDE 1 MG: 1 INJECTION, SOLUTION INTRAMUSCULAR; INTRAVENOUS; SUBCUTANEOUS at 11:27

## 2024-07-08 RX ADMIN — SODIUM CHLORIDE 1000 ML: 9 INJECTION, SOLUTION INTRAVENOUS at 11:28

## 2024-07-08 RX ADMIN — ONDANSETRON 4 MG: 2 INJECTION INTRAMUSCULAR; INTRAVENOUS at 11:27

## 2024-07-08 RX ADMIN — DEXAMETHASONE SODIUM PHOSPHATE 6 MG: 10 INJECTION INTRAMUSCULAR; INTRAVENOUS at 11:27

## 2024-07-08 NOTE — ED PROVIDER NOTES
Subjective   History of Present Illness  49-year-old female who presents to the ED today for a migraine headache.  She states she has had this headache for about 8 or 9 days.  She reports that she has been under a lot of stress due to family members health issues.  She has had nausea but no vomiting.  She denies any fever or respiratory symptoms.  She denies any nuchal rigidity.  She reports that this is similar to her previous migraines.    History provided by:  Patient  Headache  Pain location:  Generalized  Quality:  Dull  Radiates to:  Does not radiate  Severity currently:  10/10  Severity at highest:  10/10  Onset quality:  Gradual  Duration:  9 days  Timing:  Constant  Progression:  Unchanged  Chronicity:  Recurrent  Similar to prior headaches: yes    Relieved by:  Nothing  Worsened by:  Light and sound  Associated symptoms: nausea    Associated symptoms: no abdominal pain, no back pain, no blurred vision, no congestion, no cough, no diarrhea, no dizziness, no drainage, no ear pain, no eye pain, no facial pain, no fatigue, no fever, no focal weakness, no hearing loss, no loss of balance, no myalgias, no near-syncope, no neck pain, no neck stiffness, no numbness, no paresthesias, no photophobia, no seizures, no sinus pressure, no sore throat, no swollen glands, no syncope, no tingling, no URI, no visual change, no vomiting and no weakness        Review of Systems   Constitutional:  Negative for fatigue and fever.   HENT:  Negative for congestion, ear pain, hearing loss, postnasal drip, sinus pressure and sore throat.    Eyes:  Negative for blurred vision, photophobia and pain.   Respiratory:  Negative for cough.    Cardiovascular:  Negative for syncope and near-syncope.   Gastrointestinal:  Positive for nausea. Negative for abdominal pain, diarrhea and vomiting.   Genitourinary: Negative.    Musculoskeletal:  Negative for back pain, myalgias, neck pain and neck stiffness.   Skin: Negative.    Neurological:   Positive for headaches. Negative for dizziness, focal weakness, seizures, weakness, numbness, paresthesias and loss of balance.   Psychiatric/Behavioral: Negative.     All other systems reviewed and are negative.      Past Medical History:   Diagnosis Date    Hoyos's disease     Bipolar 1 disorder     Constipation     DDD (degenerative disc disease), cervical 05/29/2017    Fibromyalgia     IBS (irritable bowel syndrome)     Meningioma     Migraine     PONV (postoperative nausea and vomiting)     PTSD (post-traumatic stress disorder)     Rectal bleeding        Allergies   Allergen Reactions    Ativan [Lorazepam] Hallucinations     confusion    Sulfa Antibiotics Shortness Of Breath and Swelling    Sulfa Antibiotics Anaphylaxis    Reglan [Metoclopramide] Angioedema    Compazine [Prochlorperazine Edisylate] Hives    Demerol [Meperidine] Hives    Droperidol Itching    Metoclopramide Swelling    Toradol [Ketorolac Tromethamine] Hives and Itching    Toradol [Ketorolac Tromethamine] Hives    Zosyn [Piperacillin Sod-Tazobactam So] Hives       Past Surgical History:   Procedure Laterality Date    ANAL SCOPE N/A 7/28/2016    Procedure: ANAL SCOPE;  Surgeon: Kael Lopez MD;  Location: [facility] OR;  Service:     APPENDECTOMY      COLONOSCOPY N/A 6/30/2016    Procedure: COLONOSCOPY  CPTCODE:69120;  Surgeon: Jose Antonio Belle III, MD;  Location: [facility] OR;  Service:     COLONOSCOPY N/A 7/7/2016    Procedure: COLONOSCOPY (57316) CPT;  Surgeon: Jose Antonio Belle III, MD;  Location: [facility] OR;  Service:     CYSTOSCOPY RETROGRADE PYELOGRAM Bilateral 4/28/2017    Procedure: CYSTOSCOPY RETROGRADE PYELOGRAM;  Surgeon: Mu Blunt MD;  Location: [facility] OR;  Service:     ENDOSCOPY N/A 6/30/2016    Procedure: ESOPHAGOGASTRODUODENOSCOPY WITH BIOPSY  CPTCODE:09309;  Surgeon: Jose Antonio Belle III, MD;  Location: [facility] OR;  Service:     HEMORRHOIDECTOMY N/A 7/28/2016    Procedure: HEMORRHOID STAPLING;  Surgeon: Kael RAMOS  MD Jessica;  Location: [facility] OR;  Service:     HYSTERECTOMY      KNEE SURGERY      LAPAROSCOPIC SALPINGOOPHERECTOMY      PORTACATH PLACEMENT N/A 2017    Procedure: INSERTION OF PORTACATH;  Surgeon: Celso Arredondo MD;  Location: [facility] OR;  Service:     SHOULDER SURGERY      3 times       Family History   Problem Relation Age of Onset    Crohn's disease Other     Hypertension Other     Diabetes Other     Irritable bowel syndrome Other     No Known Problems Father     No Known Problems Mother        Social History     Socioeconomic History    Marital status:    Tobacco Use    Smoking status: Former     Current packs/day: 0.00     Average packs/day: 1 pack/day for 20.0 years (20.0 ttl pk-yrs)     Types: Cigarettes     Start date: 1995     Quit date: 2015     Years since quittin.6     Passive exposure: Past    Smokeless tobacco: Never   Vaping Use    Vaping status: Never Used   Substance and Sexual Activity    Alcohol use: No    Drug use: Yes     Types: Marijuana     Comment: for pain    Sexual activity: Defer     Birth control/protection: Surgical           Objective   Physical Exam  Vitals and nursing note reviewed.   Constitutional:       General: She is not in acute distress.     Appearance: She is underweight. She is not diaphoretic.   HENT:      Head: Normocephalic and atraumatic.      Right Ear: External ear normal.      Left Ear: External ear normal.      Nose: Nose normal.   Eyes:      Conjunctiva/sclera: Conjunctivae normal.      Pupils: Pupils are equal, round, and reactive to light.   Cardiovascular:      Rate and Rhythm: Normal rate and regular rhythm.      Pulses: Normal pulses.      Heart sounds: Normal heart sounds.   Pulmonary:      Effort: Pulmonary effort is normal.      Breath sounds: Normal breath sounds. No wheezing or rhonchi.   Chest:      Chest wall: No tenderness.   Abdominal:      General: Bowel sounds are normal.      Palpations: Abdomen is soft.       Tenderness: There is no abdominal tenderness. There is no guarding or rebound.   Musculoskeletal:         General: Normal range of motion.      Cervical back: Normal range of motion and neck supple. No rigidity or tenderness.   Lymphadenopathy:      Cervical: No cervical adenopathy.   Skin:     General: Skin is warm and dry.      Capillary Refill: Capillary refill takes less than 2 seconds.   Neurological:      General: No focal deficit present.      Mental Status: She is alert and oriented to person, place, and time.   Psychiatric:         Mood and Affect: Mood normal.         Procedures           ED Course                                             Medical Decision Making  49-year-old female who presents to the ED today for a migraine headache.  She received IV fluids as well as pain medication, nausea medication, Decadron and Benadryl.  She reports that this has resolved her headache and she feels comfortable going home.  She will follow-up as an outpatient and will return to the ED if symptoms change or worsen.    Problems Addressed:  Migraine without status migrainosus, not intractable, unspecified migraine type: complicated acute illness or injury    Risk  Prescription drug management.        Final diagnoses:   Migraine without status migrainosus, not intractable, unspecified migraine type       ED Disposition  ED Disposition       ED Disposition   Discharge    Condition   Stable    Comment   --               Mabel Patterson, APRN  [address] [address]  [phone]    Schedule an appointment as soon as possible for a visit in 2 days           Medication List      No changes were made to your prescriptions during this visit.            Kristin Shell PA  07/08/24 [id number]

## 2024-08-01 ENCOUNTER — OFFICE VISIT (OUTPATIENT)
Dept: UROLOGY | Facility: CLINIC | Age: 49
End: 2024-08-01
Payer: COMMERCIAL

## 2024-08-01 VITALS
SYSTOLIC BLOOD PRESSURE: 138 MMHG | HEIGHT: 68 IN | WEIGHT: 105.6 LBS | HEART RATE: 111 BPM | DIASTOLIC BLOOD PRESSURE: 89 MMHG | BODY MASS INDEX: 16 KG/M2

## 2024-08-01 DIAGNOSIS — R35.0 FREQUENCY OF MICTURITION: ICD-10-CM

## 2024-08-01 DIAGNOSIS — F31.9 BIPOLAR 1 DISORDER: ICD-10-CM

## 2024-08-01 DIAGNOSIS — Z48.816 AFTERCARE FOLLOWING SURGERY OF THE GENITOURINARY SYSTEM: Primary | ICD-10-CM

## 2024-08-01 DIAGNOSIS — N23 RENAL COLIC: ICD-10-CM

## 2024-08-01 DIAGNOSIS — N35.028 OTHER POST-TRAUMATIC URETHRAL STRICTURE, FEMALE: ICD-10-CM

## 2024-08-01 RX ORDER — GENTAMICIN 40 MG/ML
80 INJECTION, SOLUTION INTRAMUSCULAR; INTRAVENOUS ONCE
Status: COMPLETED | OUTPATIENT
Start: 2024-08-01 | End: 2024-08-01

## 2024-08-01 RX ORDER — OXYCODONE AND ACETAMINOPHEN 10; 325 MG/1; MG/1
1 TABLET ORAL EVERY 6 HOURS PRN
Qty: 20 TABLET | Refills: 0 | Status: SHIPPED | OUTPATIENT
Start: 2024-08-01

## 2024-08-01 RX ORDER — MEGESTROL ACETATE 20 MG/1
20 TABLET ORAL DAILY
Qty: 30 TABLET | Refills: 3 | Status: SHIPPED | OUTPATIENT
Start: 2024-08-01

## 2024-08-01 RX ORDER — PROMETHAZINE HYDROCHLORIDE 25 MG/1
25 TABLET ORAL EVERY 6 HOURS PRN
Qty: 21 TABLET | Refills: 5 | Status: SHIPPED | OUTPATIENT
Start: 2024-08-01

## 2024-08-01 RX ADMIN — GENTAMICIN 80 MG: 40 INJECTION, SOLUTION INTRAMUSCULAR; INTRAVENOUS at 08:12

## 2024-08-01 NOTE — PROGRESS NOTES
Chief Complaint:      Chief Complaint   Patient presents with    Other post-traumatic urethral stricture, female       HPI:   49 y.o. female for dilation.  Having extreme difficulty voiding.  Has significant dyspareunia.  Has severe chronic nausea with vomiting.    Past Medical History:     Past Medical History:   Diagnosis Date    Hoyos's disease     Bipolar 1 disorder     Constipation     DDD (degenerative disc disease), cervical 05/29/2017    Fibromyalgia     IBS (irritable bowel syndrome)     Meningioma     Migraine     PONV (postoperative nausea and vomiting)     PTSD (post-traumatic stress disorder)     Rectal bleeding        Current Meds:     Current Outpatient Medications   Medication Sig Dispense Refill    albuterol (PROVENTIL HFA;VENTOLIN HFA) 108 (90 Base) MCG/ACT inhaler Inhale 2 puffs 2 (Two) Times a Day.      Azelastine HCl 137 MCG/SPRAY solution 1 spray into the nostril(s) as directed by provider As Needed (Allergies).      busPIRone (BUSPAR) 15 MG tablet Take 1 tablet by mouth 3 (Three) Times a Day.      diclofenac (VOLTAREN) 1 % gel gel       divalproex (DEPAKOTE) 500 MG DR tablet Take 1 tablet by mouth 3 (Three) Times a Day. 90 tablet 2    docusate sodium (COLACE) 100 MG capsule Take 1 capsule by mouth 2 (Two) Times a Day. 20 capsule 0    fluticasone (VERAMYST) 27.5 MCG/SPRAY nasal spray 2 sprays into the nostril(s) as directed by provider Daily.      magnesium 30 MG tablet Take 1 tablet by mouth Daily. 10 tablet 0    megestrol (MEGACE) 20 MG tablet Take 1 tablet by mouth Daily. 30 tablet 3    methylPREDNISolone (MEDROL) 4 MG dose pack Take as directed on package instructions. 21 tablet 0    metroNIDAZOLE (FLAGYL) 500 MG tablet Take 1 tablet by mouth 3 (Three) Times a Day. 30 tablet 0    montelukast (SINGULAIR) 10 MG tablet Take 1 tablet by mouth Every Night.      nitrofurantoin, macrocrystal-monohydrate, (MACROBID) 100 MG capsule Take 1 capsule by mouth 2 (Two) Times a Day. 13 capsule 0     ondansetron ODT (ZOFRAN-ODT) 4 MG disintegrating tablet Place 2 tablets on the tongue Every 8 (Eight) Hours As Needed for Nausea or Vomiting. 30 tablet 0    oxyCODONE-acetaminophen (Percocet)  MG per tablet Take 1 tablet by mouth Every 6 (Six) Hours As Needed for Moderate Pain. 20 tablet 0    pantoprazole (PROTONIX) 40 MG EC tablet Take 1 tablet by mouth 2 (Two) Times a Day As Needed.      phenazopyridine (PYRIDIUM) 100 MG tablet Take 1 tablet by mouth 3 (Three) Times a Day As Needed for Bladder Spasms. 30 tablet 1    polyethylene glycol (MIRALAX) 17 GM/SCOOP powder Take 17 g by mouth Daily. 225 g 0    potassium chloride (KLOR-CON M20) 20 MEQ CR tablet Take 1 tablet by mouth Daily. 10 tablet 0    promethazine (PHENERGAN) 25 MG tablet Take 1 tablet by mouth Every 6 (Six) Hours As Needed for Nausea or Vomiting. 21 tablet 5    promethazine (PHENERGAN) 25 MG tablet Take 1 tablet by mouth Every 6 (Six) Hours As Needed for Nausea or Vomiting. 28 tablet 4    sertraline (ZOLOFT) 100 MG tablet Take 1 tablet by mouth 2 (Two) Times a Day.      SUMAtriptan (IMITREX) 6 MG/0.5ML injection Inject prescribed dose at onset of headache. May repeat dose one time in 1 hour(s) if headache not relieved.      traMADol (ULTRAM) 50 MG tablet       terazosin (HYTRIN) 2 MG capsule Take 1 capsule by mouth Every Night for 14 days. 14 capsule 0     No current facility-administered medications for this visit.        Allergies:      Allergies   Allergen Reactions    Ativan [Lorazepam] Hallucinations     confusion    Sulfa Antibiotics Shortness Of Breath and Swelling    Sulfa Antibiotics Anaphylaxis    Reglan [Metoclopramide] Angioedema    Compazine [Prochlorperazine Edisylate] Hives    Demerol [Meperidine] Hives    Droperidol Itching    Metoclopramide Swelling    Toradol [Ketorolac Tromethamine] Hives and Itching    Toradol [Ketorolac Tromethamine] Hives    Zosyn [Piperacillin Sod-Tazobactam So] Hives        Past Surgical History:     Past  Surgical History:   Procedure Laterality Date    ANAL SCOPE N/A 2016    Procedure: ANAL SCOPE;  Surgeon: Kael Lopez MD;  Location: [facility];  Service:     APPENDECTOMY      COLONOSCOPY N/A 2016    Procedure: COLONOSCOPY  CPTCODE:96754;  Surgeon: Jose Antonio Belle III, MD;  Location: [facility];  Service:     COLONOSCOPY N/A 2016    Procedure: COLONOSCOPY (95690) CPT;  Surgeon: Jose Antonio Belle III, MD;  Location:  [facility];  Service:     CYSTOSCOPY RETROGRADE PYELOGRAM Bilateral 2017    Procedure: CYSTOSCOPY RETROGRADE PYELOGRAM;  Surgeon: Mu Blunt MD;  Location:  [facility];  Service:     ENDOSCOPY N/A 2016    Procedure: ESOPHAGOGASTRODUODENOSCOPY WITH BIOPSY  CPTCODE:81031;  Surgeon: Jose Antonio Belle III, MD;  Location: [facility];  Service:     HEMORRHOIDECTOMY N/A 2016    Procedure: HEMORRHOID STAPLING;  Surgeon: Kael Lopez MD;  Location: [facility];  Service:     HYSTERECTOMY      KNEE SURGERY      LAPAROSCOPIC SALPINGOOPHERECTOMY      PORTACATH PLACEMENT N/A 2017    Procedure: INSERTION OF PORTACATH;  Surgeon: Celso Arredondo MD;  Location: [facility];  Service:     SHOULDER SURGERY      3 times       Social History:     Social History     Socioeconomic History    Marital status:    Tobacco Use    Smoking status: Former     Current packs/day: 0.00     Average packs/day: 1 pack/day for 20.0 years (20.0 ttl pk-yrs)     Types: Cigarettes     Start date: 1995     Quit date: 2015     Years since quittin.7     Passive exposure: Past    Smokeless tobacco: Never   Vaping Use    Vaping status: Never Used   Substance and Sexual Activity    Alcohol use: No    Drug use: Yes     Types: Marijuana     Comment: for pain    Sexual activity: Defer     Birth control/protection: Surgical       Family History:     Family History   Problem Relation Age of Onset    Crohn's disease Other     Hypertension Other     Diabetes Other     Irritable bowel  syndrome Other     No Known Problems Father     No Known Problems Mother        Review of Systems:     Review of Systems   Constitutional: Negative.  Negative for activity change, appetite change, chills, diaphoresis, fatigue and unexpected weight change.   HENT:  Negative for congestion, dental problem, drooling, ear discharge, ear pain, facial swelling, hearing loss, mouth sores, nosebleeds, postnasal drip, rhinorrhea, sinus pressure, sneezing, sore throat, tinnitus, trouble swallowing and voice change.    Eyes: Negative.  Negative for photophobia, pain, discharge, redness, itching and visual disturbance.   Respiratory: Negative.  Negative for apnea, cough, choking, chest tightness, shortness of breath, wheezing and stridor.    Cardiovascular: Negative.  Negative for chest pain, palpitations and leg swelling.   Gastrointestinal:  Positive for nausea and vomiting. Negative for abdominal distention, abdominal pain, anal bleeding, blood in stool, constipation, diarrhea and rectal pain.   Endocrine: Negative.  Negative for cold intolerance, heat intolerance, polydipsia, polyphagia and polyuria.   Genitourinary:  Positive for difficulty urinating and dyspareunia.   Musculoskeletal: Negative.  Negative for arthralgias, back pain, gait problem, joint swelling, myalgias, neck pain and neck stiffness.   Skin: Negative.  Negative for color change, pallor, rash and wound.   Allergic/Immunologic: Negative.  Negative for environmental allergies, food allergies and immunocompromised state.   Neurological: Negative.  Negative for dizziness, tremors, seizures, syncope, facial asymmetry, speech difficulty, weakness, light-headedness, numbness and headaches.   Hematological: Negative.  Negative for adenopathy. Does not bruise/bleed easily.   Psychiatric/Behavioral:  Negative for agitation, behavioral problems, confusion, decreased concentration, dysphoric mood, hallucinations, self-injury, sleep disturbance and suicidal ideas. The  patient is not nervous/anxious and is not hyperactive.    All other systems reviewed and are negative.      Physical Exam:     Physical Exam  Constitutional:       Appearance: She is well-developed.   HENT:      Head: Normocephalic and atraumatic.      Right Ear: External ear normal.      Left Ear: External ear normal.   Eyes:      Conjunctiva/sclera: Conjunctivae normal.      Pupils: Pupils are equal, round, and reactive to light.   Cardiovascular:      Rate and Rhythm: Normal rate and regular rhythm.      Heart sounds: Normal heart sounds.   Pulmonary:      Effort: Pulmonary effort is normal.      Breath sounds: Normal breath sounds.   Abdominal:      General: Bowel sounds are normal. There is no distension.      Palpations: Abdomen is soft. There is no mass.      Tenderness: There is no abdominal tenderness. There is no guarding or rebound.   Genitourinary:     Vagina: No vaginal discharge.      Comments: Thin white female no prolapse  Musculoskeletal:         General: Normal range of motion.   Skin:     General: Skin is warm and dry.   Neurological:      Mental Status: She is alert.      Deep Tendon Reflexes: Reflexes are normal and symmetric.   Psychiatric:         Behavior: Behavior normal.         Thought Content: Thought content normal.         Judgment: Judgment normal.       I have reviewed the following portions of the patient's history: Allergies, current medications, past family history, past medical history, past social history, past surgical history, problem list, and ROS and confirm it is accurate.      Recent Image (CT and/or KUB):      CT Abdomen and Pelvis: No results found for this or any previous visit.       CT Stone Protocol: Results for orders placed during the hospital encounter of 10/10/23    CT Abdomen Pelvis Stone Protocol    Narrative  CT ABDOMEN PELVIS STONE PROTOCOL-: 10/10/2023 9:19 PM    REASON FOR EXAM: Flank pain, kidney stone suspected LT FLANK PAIN.    TECHNIQUE: Noncontrast 4 mm  axial sections with sagittal and coronal  reformats.    COMPARISON: 3/16/2023.    FINDINGS: There is focal bronchiectasis within the right lower lobe  (image 4/59), now with probable mucous plugging.  Noting the noncontrast  technique, the liver is unremarkable.  No focal liver lesions are  identified.  Best demonstrated on narrow windows, there is the  suggestion of cholelithiasis dependently within the gallbladder.  There  is no biliary ductal dilatation.  The spleen is at the upper limits of  normal in size.  The pancreas, adrenal glands and kidneys are within  normal limits, noting a probable cyst within the lower pole of the right  kidney (image 3/37).  There is no nephrolithiasis.  There is no abnormal  dilatation of either renal collecting system or ureter.  There is no  ureterolithiasis.  The abdominal aorta is normal in caliber, noting mild  vascular calcification.  There is a surgical anastomosis at the  rectoanal junction.  No complication is evident.  There are no findings  of bowel obstruction.  Noting the lack of enteric contrast material,  there is no abnormal bowel wall thickening.  The appendix is not  identified, however there are no findings of appendicitis.  There is no  free intraperitoneal fluid.  The uterus is absent.  The ovaries are not  identified.  There are no abnormal adnexal masses.  There is no lymph  node enlargement.  The bladder is unremarkable.  There is an old  fracture of L2.    Impression  1.  No acute abnormality of the abdomen or pelvis.  There is no  nephrolithiasis or ureterolithiasis.  2.  Incidental findings, as above, similar in appearance to 3/16/2023.      This report was finalized on 10/10/2023 11:05 PM by Steph Fabian MD.       KUB: Results for orders placed during the hospital encounter of 01/10/23    XR Abdomen KUB    Narrative  EXAM:  XR Abdomen, 1 View    EXAM DATE:  1/10/2023 12:05 PM    CLINICAL HISTORY:  flank pain    TECHNIQUE:  Frontal supine view of the  abdomen/pelvis.    COMPARISON:  10/01/2022    FINDINGS:  Gastrointestinal tract:  Unremarkable.  No dilation.  Organs:  No stones identified along the expected course of ureters.  Bones/joints:  Unremarkable.  Soft tissues:  Surgical staples in the lower pelvis.  Vasculature:  Stable phleboliths lower pelvis.    Impression  1.  No acute findings radiographically evident.  2.  No radiographic evidence of renal or ureteral stones.    This report was finalized on 1/10/2023 12:47 PM by Dr. Ankit Naik MD.       Labs (past 3 months):      Admission on 05/15/2024, Discharged on 05/15/2024   Component Date Value Ref Range Status    Glucose 05/15/2024 94  65 - 99 mg/dL Final    BUN 05/15/2024 9  6 - 20 mg/dL Final    Creatinine 05/15/2024 0.55 (L)  0.57 - 1.00 mg/dL Final    Sodium 05/15/2024 140  136 - 145 mmol/L Final    Potassium 05/15/2024 4.0  3.5 - 5.2 mmol/L Final    Chloride 05/15/2024 103  98 - 107 mmol/L Final    CO2 05/15/2024 29.5 (H)  22.0 - 29.0 mmol/L Final    Calcium 05/15/2024 8.9  8.6 - 10.5 mg/dL Final    Total Protein 05/15/2024 6.6  6.0 - 8.5 g/dL Final    Albumin 05/15/2024 4.1  3.5 - 5.2 g/dL Final    ALT (SGPT) 05/15/2024 49 (H)  1 - 33 U/L Final    AST (SGOT) 05/15/2024 40 (H)  1 - 32 U/L Final    Alkaline Phosphatase 05/15/2024 96  39 - 117 U/L Final    Total Bilirubin 05/15/2024 0.5  0.0 - 1.2 mg/dL Final    Globulin 05/15/2024 2.5  gm/dL Final    A/G Ratio 05/15/2024 1.6  g/dL Final    BUN/Creatinine Ratio 05/15/2024 16.4  7.0 - 25.0 Final    Anion Gap 05/15/2024 7.5  5.0 - 15.0 mmol/L Final    eGFR 05/15/2024 112.5  >60.0 mL/min/1.73 Final    Sed Rate 05/15/2024 5  0 - 20 mm/hr Final    C-Reactive Protein 05/15/2024 <0.30  0.00 - 0.50 mg/dL Final    WBC 05/15/2024 5.49  3.40 - 10.80 10*3/mm3 Final    RBC 05/15/2024 4.64  3.77 - 5.28 10*6/mm3 Final    Hemoglobin 05/15/2024 14.7  12.0 - 15.9 g/dL Final    Hematocrit 05/15/2024 43.6  34.0 - 46.6 % Final    MCV 05/15/2024 94.0  79.0 - 97.0 fL  Final    MCH 05/15/2024 31.7  26.6 - 33.0 pg Final    MCHC 05/15/2024 33.7  31.5 - 35.7 g/dL Final    RDW 05/15/2024 13.2  12.3 - 15.4 % Final    RDW-SD 05/15/2024 45.6  37.0 - 54.0 fl Final    MPV 05/15/2024 9.9  6.0 - 12.0 fL Final    Platelets 05/15/2024 144  140 - 450 10*3/mm3 Final    Neutrophil % 05/15/2024 47.4  42.7 - 76.0 % Final    Lymphocyte % 05/15/2024 41.0  19.6 - 45.3 % Final    Monocyte % 05/15/2024 8.2  5.0 - 12.0 % Final    Eosinophil % 05/15/2024 2.6  0.3 - 6.2 % Final    Basophil % 05/15/2024 0.4  0.0 - 1.5 % Final    Immature Grans % 05/15/2024 0.4  0.0 - 0.5 % Final    Neutrophils, Absolute 05/15/2024 2.61  1.70 - 7.00 10*3/mm3 Final    Lymphocytes, Absolute 05/15/2024 2.25  0.70 - 3.10 10*3/mm3 Final    Monocytes, Absolute 05/15/2024 0.45  0.10 - 0.90 10*3/mm3 Final    Eosinophils, Absolute 05/15/2024 0.14  0.00 - 0.40 10*3/mm3 Final    Basophils, Absolute 05/15/2024 0.02  0.00 - 0.20 10*3/mm3 Final    Immature Grans, Absolute 05/15/2024 0.02  0.00 - 0.05 10*3/mm3 Final    nRBC 05/15/2024 0.0  0.0 - 0.2 /100 WBC Final    Extra Tube 05/15/2024 Hold for add-ons.   Final    Auto resulted.    Extra Tube 05/15/2024 hold for add-on   Final    Auto resulted    Extra Tube 05/15/2024 Hold for add-ons.   Final    Auto resulted.    Extra Tube 05/15/2024 Hold for add-ons.   Final    Auto resulted        Procedure:     Urethral dilation-after an appropriate informed consent, the patient was brought to the procedure suite.  The urethra was gently anesthetized with 10 mL of 2% viscous Xylocaine jelly.  After an adequate period of topical anesthesia I went ahead and the urethra was gently anesthetized and dilated with Reese sounds from 16 to 26 [place] sequentially without complication. The patient was given gentamicin as prophylaxis with 80 mg.  Assessment/Plan:   Posttraumatic urethral stricture-status post dilation given prophylactic gentamicin.  Narcotic pain medication-patient has significant acute  pain that I believe would be an indication for the use of narcotic pain medication.  I discussed the significant risks of pain medication and the fact that this will be a short only option and I will give her no more than a three-day supply of pain medication, I will not plan long-term medication, and that this will be sent to a pain clinic if it at all becomes necessary.  We discussed signing a pain medication agreement and the fact that we're going to run a state [place] review to be sure the patient is not getting pain medication from elsewhere.  If this is the case, we will not give pain medication as part of the patient's treatment plan of there being prescribed a controlled substance with potential for abuse.  This patient has been well aware of the appropriate dose of such medications including the risks for somnolence, limited ability to drive and/or safety and the significant potential for overdose.  It has been made clear that these medications are for the prescribed patient only without concomitant use of alcohol or other substance unless prescribed by the medical provider.  Has completed prescribing agreement detailing the terms of continue prescribing him a controlled substance including monitoring [place] Alberto reports, the possibility of urine drug screens, and pill counts.  The patient is aware that we review [place] ALBERTO reports on a regular basis and scan them into the chart.  History and physical examination exhibited continued safe and appropriate use of controlled substances. We also discussed the fact that the new [place] legislation allows only a three-day prescription for pain medication.  In this situation he will be referred to a chronic pain clinic.  Nausea and vomiting-patient has significant nausea and vomiting as a consequence of this.  We recommended Phenergan.  We also discussed the use of Zofran and the sedating side effects of Phenergan as well.  Dyspareunia              This document has been  electronically signed by VERENICE FINK MD August 1, 2024 08:00 EDT    Dictated Utilizing Dragon Dictation: Part of this note may be an electronic transcription/translation of spoken language to printed text using the Dragon Dictation System.

## 2024-08-15 ENCOUNTER — HOSPITAL ENCOUNTER (EMERGENCY)
Facility: HOSPITAL | Age: 49
Discharge: HOME | End: 2024-08-16
Attending: STUDENT IN AN ORGANIZED HEALTH CARE EDUCATION/TRAINING PROGRAM
Payer: COMMERCIAL

## 2024-08-15 ENCOUNTER — APPOINTMENT (OUTPATIENT)
Dept: GENERAL RADIOLOGY | Facility: HOSPITAL | Age: 49
End: 2024-08-15
Payer: COMMERCIAL

## 2024-08-15 VITALS
WEIGHT: 118 LBS | HEIGHT: 68 IN | TEMPERATURE: 98.5 F | DIASTOLIC BLOOD PRESSURE: 82 MMHG | SYSTOLIC BLOOD PRESSURE: 125 MMHG | BODY MASS INDEX: 17.88 KG/M2 | HEART RATE: 109 BPM | RESPIRATION RATE: 16 BRPM | OXYGEN SATURATION: 97 %

## 2024-08-15 DIAGNOSIS — S22.31XA CLOSED FRACTURE OF ONE RIB OF RIGHT SIDE, INITIAL ENCOUNTER: ICD-10-CM

## 2024-08-15 DIAGNOSIS — G43.809 OTHER MIGRAINE WITHOUT STATUS MIGRAINOSUS, NOT INTRACTABLE: Primary | ICD-10-CM

## 2024-08-15 LAB
ALBUMIN SERPL-MCNC: 4.1 G/DL (ref 3.5–5.2)
ALBUMIN/GLOB SERPL: 1.7 G/DL
ALP SERPL-CCNC: 105 U/L (ref 39–117)
ALT SERPL W P-5'-P-CCNC: 58 U/L (ref 1–33)
ANION GAP SERPL CALCULATED.3IONS-SCNC: 9.8 MMOL/L (ref 5–15)
AST SERPL-CCNC: 47 U/L (ref 1–32)
BASOPHILS # BLD AUTO: 0.02 10*3/MM3 (ref 0–0.2)
BASOPHILS NFR BLD AUTO: 0.3 % (ref 0–1.5)
BILIRUB SERPL-MCNC: 0.5 MG/DL (ref 0–1.2)
BUN SERPL-MCNC: 6 MG/DL (ref 6–20)
BUN/CREAT SERPL: 10.7 (ref 7–25)
CALCIUM SPEC-SCNC: 9.1 MG/DL (ref 8.6–10.5)
CHLORIDE SERPL-SCNC: 107 MMOL/L (ref 98–107)
CO2 SERPL-SCNC: 25.2 MMOL/L (ref 22–29)
CREAT SERPL-MCNC: 0.56 MG/DL (ref 0.57–1)
DEPRECATED RDW RBC AUTO: 44.2 FL (ref 37–54)
EGFRCR SERPLBLD CKD-EPI 2021: 112 ML/MIN/1.73
EOSINOPHIL # BLD AUTO: 0.12 10*3/MM3 (ref 0–0.4)
EOSINOPHIL NFR BLD AUTO: 1.8 % (ref 0.3–6.2)
ERYTHROCYTE [DISTWIDTH] IN BLOOD BY AUTOMATED COUNT: 12.9 % (ref 12.3–15.4)
GLOBULIN UR ELPH-MCNC: 2.4 GM/DL
GLUCOSE SERPL-MCNC: 80 MG/DL (ref 65–99)
HCT VFR BLD AUTO: 48.3 % (ref 34–46.6)
HGB BLD-MCNC: 16.2 G/DL (ref 12–15.9)
HOLD SPECIMEN: NORMAL
HOLD SPECIMEN: NORMAL
IMM GRANULOCYTES # BLD AUTO: 0.02 10*3/MM3 (ref 0–0.05)
IMM GRANULOCYTES NFR BLD AUTO: 0.3 % (ref 0–0.5)
LIPASE SERPL-CCNC: 24 U/L (ref 13–60)
LYMPHOCYTES # BLD AUTO: 2.26 10*3/MM3 (ref 0.7–3.1)
LYMPHOCYTES NFR BLD AUTO: 34.3 % (ref 19.6–45.3)
MCH RBC QN AUTO: 31.3 PG (ref 26.6–33)
MCHC RBC AUTO-ENTMCNC: 33.5 G/DL (ref 31.5–35.7)
MCV RBC AUTO: 93.4 FL (ref 79–97)
MONOCYTES # BLD AUTO: 0.45 10*3/MM3 (ref 0.1–0.9)
MONOCYTES NFR BLD AUTO: 6.8 % (ref 5–12)
NEUTROPHILS NFR BLD AUTO: 3.72 10*3/MM3 (ref 1.7–7)
NEUTROPHILS NFR BLD AUTO: 56.5 % (ref 42.7–76)
NRBC BLD AUTO-RTO: 0 /100 WBC (ref 0–0.2)
PLATELET # BLD AUTO: 159 10*3/MM3 (ref 140–450)
PMV BLD AUTO: 10.2 FL (ref 6–12)
POTASSIUM SERPL-SCNC: 4.1 MMOL/L (ref 3.5–5.2)
PROT SERPL-MCNC: 6.5 G/DL (ref 6–8.5)
RBC # BLD AUTO: 5.17 10*6/MM3 (ref 3.77–5.28)
SODIUM SERPL-SCNC: 142 MMOL/L (ref 136–145)
WBC NRBC COR # BLD AUTO: 6.59 10*3/MM3 (ref 3.4–10.8)
WHOLE BLOOD HOLD COAG: NORMAL
WHOLE BLOOD HOLD SPECIMEN: NORMAL
WHOLE BLOOD HOLD SPECIMEN: NORMAL

## 2024-08-15 PROCEDURE — 25010000002 DIPHENHYDRAMINE PER 50 MG: Performed by: NURSE PRACTITIONER

## 2024-08-15 PROCEDURE — 25010000002 ONDANSETRON PER 1 MG

## 2024-08-15 PROCEDURE — 80053 COMPREHEN METABOLIC PANEL: CPT | Performed by: STUDENT IN AN ORGANIZED HEALTH CARE EDUCATION/TRAINING PROGRAM

## 2024-08-15 PROCEDURE — 25010000002 HYDROMORPHONE PER 4 MG: Performed by: NURSE PRACTITIONER

## 2024-08-15 PROCEDURE — 25010000002 DEXAMETHASONE SODIUM PHOSPHATE 10 MG/ML SOLUTION: Performed by: NURSE PRACTITIONER

## 2024-08-15 PROCEDURE — 25810000003 SODIUM CHLORIDE 0.9 % SOLUTION: Performed by: NURSE PRACTITIONER

## 2024-08-15 PROCEDURE — 96375 TX/PRO/DX INJ NEW DRUG ADDON: CPT

## 2024-08-15 PROCEDURE — 99283 EMERGENCY DEPT VISIT LOW MDM: CPT

## 2024-08-15 PROCEDURE — 25010000002 HYDROMORPHONE 1 MG/ML SOLUTION: Performed by: STUDENT IN AN ORGANIZED HEALTH CARE EDUCATION/TRAINING PROGRAM

## 2024-08-15 PROCEDURE — 85025 COMPLETE CBC W/AUTO DIFF WBC: CPT | Performed by: EMERGENCY MEDICINE

## 2024-08-15 PROCEDURE — 71111 X-RAY EXAM RIBS/CHEST4/> VWS: CPT | Performed by: RADIOLOGY

## 2024-08-15 PROCEDURE — 96376 TX/PRO/DX INJ SAME DRUG ADON: CPT

## 2024-08-15 PROCEDURE — 83690 ASSAY OF LIPASE: CPT | Performed by: STUDENT IN AN ORGANIZED HEALTH CARE EDUCATION/TRAINING PROGRAM

## 2024-08-15 PROCEDURE — 96374 THER/PROPH/DIAG INJ IV PUSH: CPT

## 2024-08-15 PROCEDURE — 71111 X-RAY EXAM RIBS/CHEST4/> VWS: CPT

## 2024-08-15 RX ORDER — DEXAMETHASONE SODIUM PHOSPHATE 10 MG/ML
10 INJECTION, SOLUTION INTRAMUSCULAR; INTRAVENOUS ONCE
Status: COMPLETED | OUTPATIENT
Start: 2024-08-15 | End: 2024-08-15

## 2024-08-15 RX ORDER — SODIUM CHLORIDE 0.9 % (FLUSH) 0.9 %
10 SYRINGE (ML) INJECTION AS NEEDED
Status: DISCONTINUED | OUTPATIENT
Start: 2024-08-15 | End: 2024-08-16 | Stop reason: HOSPADM

## 2024-08-15 RX ORDER — HYDROMORPHONE HYDROCHLORIDE 1 MG/ML
0.5 INJECTION, SOLUTION INTRAMUSCULAR; INTRAVENOUS; SUBCUTANEOUS ONCE
Status: COMPLETED | OUTPATIENT
Start: 2024-08-15 | End: 2024-08-15

## 2024-08-15 RX ORDER — TRAMADOL HYDROCHLORIDE 50 MG/1
50 TABLET ORAL EVERY 8 HOURS PRN
Qty: 12 TABLET | Refills: 0 | Status: SHIPPED | OUTPATIENT
Start: 2024-08-15

## 2024-08-15 RX ORDER — ONDANSETRON 2 MG/ML
4 INJECTION INTRAMUSCULAR; INTRAVENOUS ONCE
Status: COMPLETED | OUTPATIENT
Start: 2024-08-15 | End: 2024-08-15

## 2024-08-15 RX ORDER — DIPHENHYDRAMINE HYDROCHLORIDE 50 MG/ML
25 INJECTION INTRAMUSCULAR; INTRAVENOUS ONCE
Status: COMPLETED | OUTPATIENT
Start: 2024-08-15 | End: 2024-08-15

## 2024-08-15 RX ADMIN — HYDROMORPHONE HYDROCHLORIDE 0.5 MG: 1 INJECTION, SOLUTION INTRAMUSCULAR; INTRAVENOUS; SUBCUTANEOUS at 21:51

## 2024-08-15 RX ADMIN — HYDROMORPHONE HYDROCHLORIDE 1 MG: 1 INJECTION, SOLUTION INTRAMUSCULAR; INTRAVENOUS; SUBCUTANEOUS at 23:08

## 2024-08-15 RX ADMIN — SODIUM CHLORIDE 1000 ML: 9 INJECTION, SOLUTION INTRAVENOUS at 20:33

## 2024-08-15 RX ADMIN — ONDANSETRON HYDROCHLORIDE 4 MG: 2 SOLUTION INTRAMUSCULAR; INTRAVENOUS at 23:06

## 2024-08-15 RX ADMIN — DEXAMETHASONE SODIUM PHOSPHATE 10 MG: 10 INJECTION INTRAMUSCULAR; INTRAVENOUS at 20:39

## 2024-08-15 RX ADMIN — DIPHENHYDRAMINE HYDROCHLORIDE 25 MG: 50 INJECTION, SOLUTION INTRAMUSCULAR; INTRAVENOUS at 20:34

## 2024-08-15 RX ADMIN — HYDROMORPHONE HYDROCHLORIDE 0.5 MG: 1 INJECTION, SOLUTION INTRAMUSCULAR; INTRAVENOUS; SUBCUTANEOUS at 20:41

## 2024-08-16 PROCEDURE — 25010000002 HEPARIN LOCK FLUSH PER 10 UNITS: Performed by: STUDENT IN AN ORGANIZED HEALTH CARE EDUCATION/TRAINING PROGRAM

## 2024-08-16 RX ORDER — HYDROCODONE BITARTRATE AND ACETAMINOPHEN 7.5; 325 MG/1; MG/1
1 TABLET ORAL ONCE
Status: COMPLETED | OUTPATIENT
Start: 2024-08-16 | End: 2024-08-16

## 2024-08-16 RX ORDER — HEPARIN SODIUM (PORCINE) LOCK FLUSH IV SOLN 100 UNIT/ML 100 UNIT/ML
300 SOLUTION INTRAVENOUS ONCE
Status: COMPLETED | OUTPATIENT
Start: 2024-08-16 | End: 2024-08-16

## 2024-08-16 RX ADMIN — Medication 300 UNITS: at 00:20

## 2024-08-16 RX ADMIN — HYDROCODONE BITARTRATE AND ACETAMINOPHEN 1 TABLET: 7.5; 325 TABLET ORAL at 00:22

## 2024-08-16 NOTE — ED PROVIDER NOTES
Subjective   History of Present Illness  Patient is a 49-year-old female with past medical history significant for Hoyos's disease, bipolar disorder, fibromyalgia, chronic pain syndrome, PTSD and migraines.  She presents to the ED today with complaints of migraine.  She states she is also having some left-sided rib pain.  Reports nausea and vomiting.  Denies fever.  Denies any other significant complaints.        Review of Systems   Constitutional: Negative.  Negative for fever.   Eyes: Negative.    Respiratory: Negative.     Cardiovascular: Negative.  Negative for chest pain.   Gastrointestinal: Negative.  Negative for abdominal pain.   Endocrine: Negative.    Genitourinary: Negative.  Negative for dysuria.   Musculoskeletal: Negative.    Skin: Negative.    Neurological:  Positive for headaches.   Hematological: Negative.    Psychiatric/Behavioral: Negative.     All other systems reviewed and are negative.      Past Medical History:   Diagnosis Date    Hoyos's disease     Bipolar 1 disorder     Constipation     DDD (degenerative disc disease), cervical 05/29/2017    Fibromyalgia     IBS (irritable bowel syndrome)     Meningioma     Migraine     PONV (postoperative nausea and vomiting)     PTSD (post-traumatic stress disorder)     Rectal bleeding        Allergies   Allergen Reactions    Ativan [Lorazepam] Hallucinations     confusion    Sulfa Antibiotics Shortness Of Breath and Swelling    Sulfa Antibiotics Anaphylaxis    Reglan [Metoclopramide] Angioedema    Compazine [Prochlorperazine Edisylate] Hives    Demerol [Meperidine] Hives    Droperidol Itching    Metoclopramide Swelling    Toradol [Ketorolac Tromethamine] Hives and Itching    Toradol [Ketorolac Tromethamine] Hives    Zosyn [Piperacillin Sod-Tazobactam So] Hives       Past Surgical History:   Procedure Laterality Date    ANAL SCOPE N/A 7/28/2016    Procedure: ANAL SCOPE;  Surgeon: Kael Lopez MD;  Location: [facility];  Service:     APPENDECTOMY       COLONOSCOPY N/A 2016    Procedure: COLONOSCOPY  CPTCODE:76336;  Surgeon: Jose Antonio Belle III, MD;  Location: [facility] OR;  Service:     COLONOSCOPY N/A 2016    Procedure: COLONOSCOPY (63241) CPT;  Surgeon: Jose Antonio Belle III, MD;  Location: [facility] OR;  Service:     CYSTOSCOPY RETROGRADE PYELOGRAM Bilateral 2017    Procedure: CYSTOSCOPY RETROGRADE PYELOGRAM;  Surgeon: Mu Blunt MD;  Location: [facility] OR;  Service:     ENDOSCOPY N/A 2016    Procedure: ESOPHAGOGASTRODUODENOSCOPY WITH BIOPSY  CPTCODE:33655;  Surgeon: Jose Antonio Belle III, MD;  Location: [facility] OR;  Service:     HEMORRHOIDECTOMY N/A 2016    Procedure: HEMORRHOID STAPLING;  Surgeon: Kael Lopez MD;  Location: [facility] OR;  Service:     HYSTERECTOMY      KNEE SURGERY      LAPAROSCOPIC SALPINGOOPHERECTOMY      PORTACATH PLACEMENT N/A 2017    Procedure: INSERTION OF PORTACATH;  Surgeon: Celso Arredondo MD;  Location: [facility] OR;  Service:     SHOULDER SURGERY      3 times       Family History   Problem Relation Age of Onset    Crohn's disease Other     Hypertension Other     Diabetes Other     Irritable bowel syndrome Other     No Known Problems Father     No Known Problems Mother        Social History     Socioeconomic History    Marital status:    Tobacco Use    Smoking status: Former     Current packs/day: 0.00     Average packs/day: 1 pack/day for 20.0 years (20.0 ttl pk-yrs)     Types: Cigarettes     Start date: 1995     Quit date: 2015     Years since quittin.7     Passive exposure: Past    Smokeless tobacco: Never   Vaping Use    Vaping status: Never Used   Substance and Sexual Activity    Alcohol use: No    Drug use: Yes     Types: Marijuana     Comment: for pain    Sexual activity: Defer     Birth control/protection: Surgical           Objective   Physical Exam  Vitals and nursing note reviewed.   Constitutional:       General: She is not in acute distress.     Appearance:  She is well-developed. She is not diaphoretic.   HENT:      Head: Normocephalic and atraumatic.      Right Ear: External ear normal.      Left Ear: External ear normal.      Nose: Nose normal.      Mouth/Throat:      Mouth: Mucous membranes are moist.      Pharynx: Oropharynx is clear.   Eyes:      Conjunctiva/sclera: Conjunctivae normal.      Pupils: Pupils are equal, round, and reactive to light.   Neck:      Vascular: No JVD.      Trachea: No tracheal deviation.   Cardiovascular:      Rate and Rhythm: Normal rate and regular rhythm.      Heart sounds: Normal heart sounds. No murmur heard.  Pulmonary:      Effort: Pulmonary effort is normal. No respiratory distress.      Breath sounds: Normal breath sounds. No wheezing.   Abdominal:      General: Bowel sounds are normal.      Palpations: Abdomen is soft.      Tenderness: There is no abdominal tenderness.   Musculoskeletal:         General: No deformity. Normal range of motion.      Cervical back: Normal range of motion and neck supple.   Skin:     General: Skin is warm and dry.      Capillary Refill: Capillary refill takes less than 2 seconds.      Coloration: Skin is not pale.      Findings: No erythema or rash.   Neurological:      General: No focal deficit present.      Mental Status: She is alert and oriented to person, place, and time. Mental status is at baseline.      Cranial Nerves: No cranial nerve deficit.   Psychiatric:         Behavior: Behavior normal.         Thought Content: Thought content normal.         Procedures       Results for orders placed or performed during the hospital encounter of 08/15/24   Comprehensive Metabolic Panel    Specimen: Arm, Left; Blood   Result Value Ref Range    Glucose 80 65 - 99 mg/dL    BUN 6 6 - 20 mg/dL    Creatinine 0.56 (L) 0.57 - 1.00 mg/dL    Sodium 142 136 - 145 mmol/L    Potassium 4.1 3.5 - 5.2 mmol/L    Chloride 107 98 - 107 mmol/L    CO2 25.2 22.0 - 29.0 mmol/L    Calcium 9.1 8.6 - 10.5 mg/dL    Total  Protein 6.5 6.0 - 8.5 g/dL    Albumin 4.1 3.5 - 5.2 g/dL    ALT (SGPT) 58 (H) 1 - 33 U/L    AST (SGOT) 47 (H) 1 - 32 U/L    Alkaline Phosphatase 105 39 - 117 U/L    Total Bilirubin 0.5 0.0 - 1.2 mg/dL    Globulin 2.4 gm/dL    A/G Ratio 1.7 g/dL    BUN/Creatinine Ratio 10.7 7.0 - 25.0    Anion Gap 9.8 5.0 - 15.0 mmol/L    eGFR 112.0 >60.0 mL/min/1.73   Lipase    Specimen: Arm, Left; Blood   Result Value Ref Range    Lipase 24 13 - 60 U/L   CBC Auto Differential    Specimen: Arm, Left; Blood   Result Value Ref Range    WBC 6.59 3.40 - 10.80 10*3/mm3    RBC 5.17 3.77 - 5.28 10*6/mm3    Hemoglobin 16.2 (H) 12.0 - 15.9 g/dL    Hematocrit 48.3 (H) 34.0 - 46.6 %    MCV 93.4 79.0 - 97.0 fL    MCH 31.3 26.6 - 33.0 pg    MCHC 33.5 31.5 - 35.7 g/dL    RDW 12.9 12.3 - 15.4 %    RDW-SD 44.2 37.0 - 54.0 fl    MPV 10.2 6.0 - 12.0 fL    Platelets 159 140 - 450 10*3/mm3    Neutrophil % 56.5 42.7 - 76.0 %    Lymphocyte % 34.3 19.6 - 45.3 %    Monocyte % 6.8 5.0 - 12.0 %    Eosinophil % 1.8 0.3 - 6.2 %    Basophil % 0.3 0.0 - 1.5 %    Immature Grans % 0.3 0.0 - 0.5 %    Neutrophils, Absolute 3.72 1.70 - 7.00 10*3/mm3    Lymphocytes, Absolute 2.26 0.70 - 3.10 10*3/mm3    Monocytes, Absolute 0.45 0.10 - 0.90 10*3/mm3    Eosinophils, Absolute 0.12 0.00 - 0.40 10*3/mm3    Basophils, Absolute 0.02 0.00 - 0.20 10*3/mm3    Immature Grans, Absolute 0.02 0.00 - 0.05 10*3/mm3    nRBC 0.0 0.0 - 0.2 /100 WBC   Green Top (Gel)   Result Value Ref Range    Extra Tube Hold for add-ons.    Lavender Top   Result Value Ref Range    Extra Tube hold for add-on    Gold Top - SST   Result Value Ref Range    Extra Tube Hold for add-ons.    Light Blue Top   Result Value Ref Range    Extra Tube Hold for add-ons.    Lavender Top   Result Value Ref Range    Extra Tube hold for add-on         XR Ribs Bilateral 4+ View With PA Chest   ED Interpretation   Interpretation per radiologist      Final Result      Nondisplaced fracture of the right ninth rib. No left rib  fracture   seen. No pleural effusion or pneumothorax.           This report was finalized on 8/15/2024 11:24 PM by Alex Pallas, DO.               ED Course  ED Course as of 08/16/24 0403   Thu Aug 15, 2024   2212 Care transferred to Olga NEGRON [MB]      ED Course User Index  [MB] Tami Bianchi APRN                                             Medical Decision Making  Patient is a 49-year-old female with past medical history significant for Hoyos's disease, bipolar disorder, fibromyalgia, chronic pain syndrome, PTSD and migraines.  She presents to the ED today with complaints of migraine.  She states she is also having some left-sided rib pain.  Reports nausea and vomiting.  Denies fever.  Denies any other significant complaints.      Problems Addressed:  Closed fracture of one rib of right side, initial encounter: complicated acute illness or injury  Other migraine without status migrainosus, not intractable: complicated acute illness or injury    Amount and/or Complexity of Data Reviewed  Labs: ordered.  Radiology: ordered.    Risk  Prescription drug management.        Final diagnoses:   Other migraine without status migrainosus, not intractable   Closed fracture of one rib of right side, initial encounter       ED Disposition  ED Disposition       ED Disposition   Discharge    Condition   Stable    Comment   --               Mabel Patterson APRN  [address] [address]  [phone]    Schedule an appointment as soon as possible for a visit   As needed    [facility] EMERGENCY DEPARTMENT  [address] [place] [id number] [phone]  Go to   If symptoms worsen         Medication List        New Prescriptions      naloxone 4 MG/0.1ML nasal spray  Commonly known as: NARCAN  Call 911. Don't prime. [place] in 1 nostril for overdose. Repeat in 2-3 minutes in other nostril if no or minimal breathing/responsiveness.     traMADol 50 MG tablet  Commonly known as: ULTRAM  Take 1  tablet by mouth Every 8 (Eight) Hours As Needed for Moderate Pain.               Where to Get Your Medications        These medications were sent to [facility] [place], [place] - [address] [place] [address] - [phone]  - [phone] [address] [address]      Phone: [phone]   naloxone 4 MG/0.1ML nasal spray  traMADol 50 MG tablet            Olga Oscar, APRN  08/16/24 0403

## 2024-08-16 NOTE — ED NOTES
"Pt reports that she \"self-catheterizes\" herself at home and that when she felt the need to urinate that she would self-catheterize herself.   "

## 2024-09-03 ENCOUNTER — OFFICE VISIT (OUTPATIENT)
Dept: UROLOGY | Facility: CLINIC | Age: 49
End: 2024-09-03
Payer: COMMERCIAL

## 2024-09-03 VITALS
WEIGHT: 110.4 LBS | SYSTOLIC BLOOD PRESSURE: 118 MMHG | DIASTOLIC BLOOD PRESSURE: 74 MMHG | BODY MASS INDEX: 16.73 KG/M2 | HEIGHT: 68 IN | HEART RATE: 72 BPM

## 2024-09-03 DIAGNOSIS — N35.028 OTHER POST-TRAUMATIC URETHRAL STRICTURE, FEMALE: ICD-10-CM

## 2024-09-03 DIAGNOSIS — Z48.816 AFTERCARE FOLLOWING SURGERY OF THE GENITOURINARY SYSTEM: Primary | ICD-10-CM

## 2024-09-03 DIAGNOSIS — N23 RENAL COLIC: ICD-10-CM

## 2024-09-03 RX ORDER — GENTAMICIN 40 MG/ML
80 INJECTION, SOLUTION INTRAMUSCULAR; INTRAVENOUS ONCE
Status: COMPLETED | OUTPATIENT
Start: 2024-09-03 | End: 2024-09-03

## 2024-09-03 RX ORDER — PROMETHAZINE HYDROCHLORIDE 25 MG/1
25 TABLET ORAL EVERY 6 HOURS PRN
Qty: 28 TABLET | Refills: 4 | Status: SHIPPED | OUTPATIENT
Start: 2024-09-03

## 2024-09-03 RX ORDER — OXYCODONE AND ACETAMINOPHEN 10; 325 MG/1; MG/1
1 TABLET ORAL EVERY 6 HOURS PRN
Qty: 20 TABLET | Refills: 0 | Status: SHIPPED | OUTPATIENT
Start: 2024-09-03

## 2024-09-03 RX ADMIN — GENTAMICIN 80 MG: 40 INJECTION, SOLUTION INTRAMUSCULAR; INTRAVENOUS at 08:44

## 2024-09-03 NOTE — PROGRESS NOTES
Chief Complaint:      Chief Complaint   Patient presents with    Aftercare following surgery of the genitourinary system       HPI:   49 y.o. female here for urethral dilation    Past Medical History:     Past Medical History:   Diagnosis Date    Hoyos's disease     Bipolar 1 disorder     Constipation     DDD (degenerative disc disease), cervical 05/29/2017    Fibromyalgia     IBS (irritable bowel syndrome)     Meningioma     Migraine     PONV (postoperative nausea and vomiting)     PTSD (post-traumatic stress disorder)     Rectal bleeding        Current Meds:     Current Outpatient Medications   Medication Sig Dispense Refill    albuterol (PROVENTIL HFA;VENTOLIN HFA) 108 (90 Base) MCG/ACT inhaler Inhale 2 puffs 2 (Two) Times a Day.      Azelastine HCl 137 MCG/SPRAY solution 1 spray into the nostril(s) as directed by provider As Needed (Allergies).      busPIRone (BUSPAR) 15 MG tablet Take 1 tablet by mouth 3 (Three) Times a Day.      diclofenac (VOLTAREN) 1 % gel gel       divalproex (DEPAKOTE) 500 MG DR tablet Take 1 tablet by mouth 3 (Three) Times a Day. 90 tablet 2    docusate sodium (COLACE) 100 MG capsule Take 1 capsule by mouth 2 (Two) Times a Day. 20 capsule 0    fluticasone (VERAMYST) 27.5 MCG/SPRAY nasal spray 2 sprays into the nostril(s) as directed by provider Daily.      magnesium 30 MG tablet Take 1 tablet by mouth Daily. 10 tablet 0    megestrol (MEGACE) 20 MG tablet Take 1 tablet by mouth Daily. 30 tablet 3    methylPREDNISolone (MEDROL) 4 MG dose pack Take as directed on package instructions. 21 tablet 0    metroNIDAZOLE (FLAGYL) 500 MG tablet Take 1 tablet by mouth 3 (Three) Times a Day. 30 tablet 0    montelukast (SINGULAIR) 10 MG tablet Take 1 tablet by mouth Every Night.      naloxone (NARCAN) 4 MG/0.1ML nasal spray Call 911. Don't prime. [place] in 1 nostril for overdose. Repeat in 2-3 minutes in other nostril if no or minimal breathing/responsiveness. 2 each 0    nitrofurantoin,  macrocrystal-monohydrate, (MACROBID) 100 MG capsule Take 1 capsule by mouth 2 (Two) Times a Day. 13 capsule 0    ondansetron ODT (ZOFRAN-ODT) 4 MG disintegrating tablet Place 2 tablets on the tongue Every 8 (Eight) Hours As Needed for Nausea or Vomiting. 30 tablet 0    oxyCODONE-acetaminophen (Percocet)  MG per tablet Take 1 tablet by mouth Every 6 (Six) Hours As Needed for Moderate Pain. 20 tablet 0    pantoprazole (PROTONIX) 40 MG EC tablet Take 1 tablet by mouth 2 (Two) Times a Day As Needed.      phenazopyridine (PYRIDIUM) 100 MG tablet Take 1 tablet by mouth 3 (Three) Times a Day As Needed for Bladder Spasms. 30 tablet 1    polyethylene glycol (MIRALAX) 17 GM/SCOOP powder Take 17 g by mouth Daily. 225 g 0    potassium chloride (KLOR-CON M20) 20 MEQ CR tablet Take 1 tablet by mouth Daily. 10 tablet 0    promethazine (PHENERGAN) 25 MG tablet Take 1 tablet by mouth Every 6 (Six) Hours As Needed for Nausea or Vomiting. 28 tablet 4    promethazine (PHENERGAN) 25 MG tablet Take 1 tablet by mouth Every 6 (Six) Hours As Needed for Nausea or Vomiting. 21 tablet 5    sertraline (ZOLOFT) 100 MG tablet Take 1 tablet by mouth 2 (Two) Times a Day.      SUMAtriptan (IMITREX) 6 MG/0.5ML injection Inject prescribed dose at onset of headache. May repeat dose one time in 1 hour(s) if headache not relieved.      traMADol (ULTRAM) 50 MG tablet Take 1 tablet by mouth Every 8 (Eight) Hours As Needed for Moderate Pain. 12 tablet 0    terazosin (HYTRIN) 2 MG capsule Take 1 capsule by mouth Every Night for 14 days. 14 capsule 0     No current facility-administered medications for this visit.        Allergies:      Allergies   Allergen Reactions    Ativan [Lorazepam] Hallucinations     confusion    Sulfa Antibiotics Shortness Of Breath and Swelling    Sulfa Antibiotics Anaphylaxis    Reglan [Metoclopramide] Angioedema    Compazine [Prochlorperazine Edisylate] Hives    Demerol [Meperidine] Hives    Droperidol Itching     Metoclopramide Swelling    Toradol [Ketorolac Tromethamine] Hives and Itching    Toradol [Ketorolac Tromethamine] Hives    Zosyn [Piperacillin Sod-Tazobactam So] Hives        Past Surgical History:     Past Surgical History:   Procedure Laterality Date    ANAL SCOPE N/A 2016    Procedure: ANAL SCOPE;  Surgeon: Kael Lopez MD;  Location: [facility] OR;  Service:     APPENDECTOMY      COLONOSCOPY N/A 2016    Procedure: COLONOSCOPY  CPTCODE:90471;  Surgeon: Jose Antonio Belle III, MD;  Location: [facility] OR;  Service:     COLONOSCOPY N/A 2016    Procedure: COLONOSCOPY (02384) CPT;  Surgeon: Jose Antonio Belle III, MD;  Location: [facility] OR;  Service:     CYSTOSCOPY RETROGRADE PYELOGRAM Bilateral 2017    Procedure: CYSTOSCOPY RETROGRADE PYELOGRAM;  Surgeon: Mu Blunt MD;  Location: [facility] OR;  Service:     ENDOSCOPY N/A 2016    Procedure: ESOPHAGOGASTRODUODENOSCOPY WITH BIOPSY  CPTCODE:92426;  Surgeon: Jose Antonio Belle III, MD;  Location: [facility] OR;  Service:     HEMORRHOIDECTOMY N/A 2016    Procedure: HEMORRHOID STAPLING;  Surgeon: Kael Lopez MD;  Location: [facility] OR;  Service:     HYSTERECTOMY      KNEE SURGERY      LAPAROSCOPIC SALPINGOOPHERECTOMY      PORTACATH PLACEMENT N/A 2017    Procedure: INSERTION OF PORTACATH;  Surgeon: Celso Arredondo MD;  Location: [facility] OR;  Service:     SHOULDER SURGERY      3 times       Social History:     Social History     Socioeconomic History    Marital status:    Tobacco Use    Smoking status: Former     Current packs/day: 0.00     Average packs/day: 1 pack/day for 20.0 years (20.0 ttl pk-yrs)     Types: Cigarettes     Start date: 1995     Quit date: 2015     Years since quittin.8     Passive exposure: Past    Smokeless tobacco: Never   Vaping Use    Vaping status: Never Used   Substance and Sexual Activity    Alcohol use: No    Drug use: Yes     Types: Marijuana     Comment: for pain    Sexual  activity: Defer     Birth control/protection: Surgical       Family History:     Family History   Problem Relation Age of Onset    Crohn's disease Other     Hypertension Other     Diabetes Other     Irritable bowel syndrome Other     No Known Problems Father     No Known Problems Mother        Review of Systems:     Review of Systems   Constitutional: Negative.  Negative for activity change, appetite change, chills, diaphoresis, fatigue and unexpected weight change.   HENT:  Negative for congestion, dental problem, drooling, ear discharge, ear pain, facial swelling, hearing loss, mouth sores, nosebleeds, postnasal drip, rhinorrhea, sinus pressure, sneezing, sore throat, tinnitus, trouble swallowing and voice change.    Eyes: Negative.  Negative for photophobia, pain, discharge, redness, itching and visual disturbance.   Respiratory: Negative.  Negative for apnea, cough, choking, chest tightness, shortness of breath, wheezing and stridor.    Cardiovascular: Negative.  Negative for chest pain, palpitations and leg swelling.   Gastrointestinal: Negative.  Negative for abdominal distention, abdominal pain, anal bleeding, blood in stool, constipation, diarrhea, nausea, rectal pain and vomiting.   Endocrine: Negative.  Negative for cold intolerance, heat intolerance, polydipsia, polyphagia and polyuria.   Musculoskeletal: Negative.  Negative for arthralgias, back pain, gait problem, joint swelling, myalgias, neck pain and neck stiffness.   Skin: Negative.  Negative for color change, pallor, rash and wound.   Allergic/Immunologic: Negative.  Negative for environmental allergies, food allergies and immunocompromised state.   Neurological: Negative.  Negative for dizziness, tremors, seizures, syncope, facial asymmetry, speech difficulty, weakness, light-headedness, numbness and headaches.   Hematological: Negative.  Negative for adenopathy. Does not bruise/bleed easily.   Psychiatric/Behavioral:  Negative for agitation,  behavioral problems, confusion, decreased concentration, dysphoric mood, hallucinations, self-injury, sleep disturbance and suicidal ideas. The patient is not nervous/anxious and is not hyperactive.    All other systems reviewed and are negative.      Physical Exam:     Physical Exam  Constitutional:       Appearance: She is well-developed.   HENT:      Head: Normocephalic and atraumatic.      Right Ear: External ear normal.      Left Ear: External ear normal.   Eyes:      Conjunctiva/sclera: Conjunctivae normal.      Pupils: Pupils are equal, round, and reactive to light.   Cardiovascular:      Rate and Rhythm: Normal rate and regular rhythm.      Heart sounds: Normal heart sounds.   Pulmonary:      Effort: Pulmonary effort is normal.      Breath sounds: Normal breath sounds.   Abdominal:      General: Bowel sounds are normal. There is no distension.      Palpations: Abdomen is soft. There is no mass.      Tenderness: There is no abdominal tenderness. There is no guarding or rebound.   Genitourinary:     General: Normal vulva.      Vagina: No vaginal discharge.   Musculoskeletal:         General: Normal range of motion.   Skin:     General: Skin is warm and dry.   Neurological:      Mental Status: She is alert.      Deep Tendon Reflexes: Reflexes are normal and symmetric.   Psychiatric:         Behavior: Behavior normal.         Thought Content: Thought content normal.         Judgment: Judgment normal.         I have reviewed the following portions of the patient's history: Allergies, current medications, past family history, past medical history, past social history, past surgical history, problem list, and ROS and confirm it is accurate.    Recent Image (CT and/or KUB):      CT Abdomen and Pelvis: No results found for this or any previous visit.       CT Stone Protocol: Results for orders placed during the hospital encounter of 10/10/23    CT Abdomen Pelvis Stone Protocol    Narrative  CT ABDOMEN PELVIS STONE  PROTOCOL-: 10/10/2023 9:19 PM    REASON FOR EXAM: Flank pain, kidney stone suspected LT FLANK PAIN.    TECHNIQUE: Noncontrast 4 mm axial sections with sagittal and coronal  reformats.    COMPARISON: 3/16/2023.    FINDINGS: There is focal bronchiectasis within the right lower lobe  (image 4/59), now with probable mucous plugging.  Noting the noncontrast  technique, the liver is unremarkable.  No focal liver lesions are  identified.  Best demonstrated on narrow windows, there is the  suggestion of cholelithiasis dependently within the gallbladder.  There  is no biliary ductal dilatation.  The spleen is at the upper limits of  normal in size.  The pancreas, adrenal glands and kidneys are within  normal limits, noting a probable cyst within the lower pole of the right  kidney (image 3/37).  There is no nephrolithiasis.  There is no abnormal  dilatation of either renal collecting system or ureter.  There is no  ureterolithiasis.  The abdominal aorta is normal in caliber, noting mild  vascular calcification.  There is a surgical anastomosis at the  rectoanal junction.  No complication is evident.  There are no findings  of bowel obstruction.  Noting the lack of enteric contrast material,  there is no abnormal bowel wall thickening.  The appendix is not  identified, however there are no findings of appendicitis.  There is no  free intraperitoneal fluid.  The uterus is absent.  The ovaries are not  identified.  There are no abnormal adnexal masses.  There is no lymph  node enlargement.  The bladder is unremarkable.  There is an old  fracture of L2.    Impression  1.  No acute abnormality of the abdomen or pelvis.  There is no  nephrolithiasis or ureterolithiasis.  2.  Incidental findings, as above, similar in appearance to 3/16/2023.      This report was finalized on 10/10/2023 11:05 PM by Steph Fabian MD.       KUB: Results for orders placed during the hospital encounter of 01/10/23    XR Abdomen  KUB    Narrative  EXAM:  XR Abdomen, 1 View    EXAM DATE:  1/10/2023 12:05 PM    CLINICAL HISTORY:  flank pain    TECHNIQUE:  Frontal supine view of the abdomen/pelvis.    COMPARISON:  10/01/2022    FINDINGS:  Gastrointestinal tract:  Unremarkable.  No dilation.  Organs:  No stones identified along the expected course of ureters.  Bones/joints:  Unremarkable.  Soft tissues:  Surgical staples in the lower pelvis.  Vasculature:  Stable phleboliths lower pelvis.    Impression  1.  No acute findings radiographically evident.  2.  No radiographic evidence of renal or ureteral stones.    This report was finalized on 1/10/2023 12:47 PM by Dr. Ankit Naik MD.       Labs (past 3 months):      Admission on 08/15/2024, Discharged on 08/16/2024   Component Date Value Ref Range Status    Glucose 08/15/2024 80  65 - 99 mg/dL Final    BUN 08/15/2024 6  6 - 20 mg/dL Final    Creatinine 08/15/2024 0.56 (L)  0.57 - 1.00 mg/dL Final    Sodium 08/15/2024 142  136 - 145 mmol/L Final    Potassium 08/15/2024 4.1  3.5 - 5.2 mmol/L Final    Slight hemolysis detected by analyzer. Result may be falsely elevated.    Chloride 08/15/2024 107  98 - 107 mmol/L Final    CO2 08/15/2024 25.2  22.0 - 29.0 mmol/L Final    Calcium 08/15/2024 9.1  8.6 - 10.5 mg/dL Final    Total Protein 08/15/2024 6.5  6.0 - 8.5 g/dL Final    Albumin 08/15/2024 4.1  3.5 - 5.2 g/dL Final    ALT (SGPT) 08/15/2024 58 (H)  1 - 33 U/L Final    AST (SGOT) 08/15/2024 47 (H)  1 - 32 U/L Final    Alkaline Phosphatase 08/15/2024 105  39 - 117 U/L Final    Total Bilirubin 08/15/2024 0.5  0.0 - 1.2 mg/dL Final    Globulin 08/15/2024 2.4  gm/dL Final    A/G Ratio 08/15/2024 1.7  g/dL Final    BUN/Creatinine Ratio 08/15/2024 10.7  7.0 - 25.0 Final    Anion Gap 08/15/2024 9.8  5.0 - 15.0 mmol/L Final    eGFR 08/15/2024 112.0  >60.0 mL/min/1.73 Final    Lipase 08/15/2024 24  13 - 60 U/L Final    Extra Tube 08/15/2024 Hold for add-ons.   Final    Auto resulted.    Extra Tube  08/15/2024 hold for add-on   Final    Auto resulted    Extra Tube 08/15/2024 Hold for add-ons.   Final    Auto resulted.    Extra Tube 08/15/2024 Hold for add-ons.   Final    Auto resulted    WBC 08/15/2024 6.59  3.40 - 10.80 10*3/mm3 Final    RBC 08/15/2024 5.17  3.77 - 5.28 10*6/mm3 Final    Hemoglobin 08/15/2024 16.2 (H)  12.0 - 15.9 g/dL Final    Hematocrit 08/15/2024 48.3 (H)  34.0 - 46.6 % Final    MCV 08/15/2024 93.4  79.0 - 97.0 fL Final    MCH 08/15/2024 31.3  26.6 - 33.0 pg Final    MCHC 08/15/2024 33.5  31.5 - 35.7 g/dL Final    RDW 08/15/2024 12.9  12.3 - 15.4 % Final    RDW-SD 08/15/2024 44.2  37.0 - 54.0 fl Final    MPV 08/15/2024 10.2  6.0 - 12.0 fL Final    Platelets 08/15/2024 159  140 - 450 10*3/mm3 Final    Neutrophil % 08/15/2024 56.5  42.7 - 76.0 % Final    Lymphocyte % 08/15/2024 34.3  19.6 - 45.3 % Final    Monocyte % 08/15/2024 6.8  5.0 - 12.0 % Final    Eosinophil % 08/15/2024 1.8  0.3 - 6.2 % Final    Basophil % 08/15/2024 0.3  0.0 - 1.5 % Final    Immature Grans % 08/15/2024 0.3  0.0 - 0.5 % Final    Neutrophils, Absolute 08/15/2024 3.72  1.70 - 7.00 10*3/mm3 Final    Lymphocytes, Absolute 08/15/2024 2.26  0.70 - 3.10 10*3/mm3 Final    Monocytes, Absolute 08/15/2024 0.45  0.10 - 0.90 10*3/mm3 Final    Eosinophils, Absolute 08/15/2024 0.12  0.00 - 0.40 10*3/mm3 Final    Basophils, Absolute 08/15/2024 0.02  0.00 - 0.20 10*3/mm3 Final    Immature Grans, Absolute 08/15/2024 0.02  0.00 - 0.05 10*3/mm3 Final    nRBC 08/15/2024 0.0  0.0 - 0.2 /100 WBC Final    Extra Tube 08/15/2024 hold for add-on   Final    Auto resulted        Procedure:   Urethral dilation-after an appropriate informed consent, the patient was brought to the procedure suite.  The urethra was gently anesthetized with 10 mL of 2% viscous Xylocaine jelly.  After an adequate period of topical anesthesia I went ahead and  dilated with [place] sounds from 16 to 26 [place] sequentially without complication. The patient was given  gentamicin as prophylaxis with 80 mg.    Assessment/Plan:   Posttraumatic urethral stricture-status post dilation  Narcotic pain medication-patient has significant acute pain that I believe would be an indication for the use of narcotic pain medication.  I discussed the significant risks of pain medication and the fact that this will be a short only option and I will give her no more than a three-day supply of pain medication, I will not plan long-term medication, and that this will be sent to a pain clinic if it at all becomes necessary.  We discussed signing a pain medication agreement and the fact that we're going to run a state [place] review to be sure the patient is not getting pain medication from elsewhere.  If this is the case, we will not give pain medication as part of the patient's treatment plan of there being prescribed a controlled substance with potential for abuse.  This patient has been well aware of the appropriate dose of such medications including the risks for somnolence, limited ability to drive and/or safety and the significant potential for overdose.  It has been made clear that these medications are for the prescribed patient only without concomitant use of alcohol or other substance unless prescribed by the medical provider.  Has completed prescribing agreement detailing the terms of continue prescribing him a controlled substance including monitoring [place] reports, the possibility of urine drug screens, and pill counts.  The patient is aware that we review [place]BERTO reports on a regular basis and scan them into the chart.  History and physical examination exhibited continued safe and appropriate use of controlled substances. We also discussed the fact that the new [place] legislation allows only a three-day prescription for pain medication.  In this situation he will be referred to a chronic pain clinic.  Nausea and vomiting-patient has significant nausea and vomiting as a consequence of  this.  We recommended Phenergan.  We also discussed the use of Zofran and the sedating side effects of Phenergan as well.          This document has been electronically signed by VERENICE FINK MD September 3, 2024 08:13 EDT    Dictated Utilizing Dragon Dictation: Part of this note may be an electronic transcription/translation of spoken language to printed text using the Dragon Dictation System.

## 2024-10-01 ENCOUNTER — OFFICE VISIT (OUTPATIENT)
Dept: UROLOGY | Facility: CLINIC | Age: 49
End: 2024-10-01
Payer: COMMERCIAL

## 2024-10-01 VITALS
HEART RATE: 94 BPM | HEIGHT: 68 IN | WEIGHT: 106.8 LBS | DIASTOLIC BLOOD PRESSURE: 93 MMHG | SYSTOLIC BLOOD PRESSURE: 149 MMHG | BODY MASS INDEX: 16.18 KG/M2

## 2024-10-01 DIAGNOSIS — N23 RENAL COLIC: ICD-10-CM

## 2024-10-01 DIAGNOSIS — R35.0 FREQUENCY OF MICTURITION: ICD-10-CM

## 2024-10-01 DIAGNOSIS — Z48.816 AFTERCARE FOLLOWING SURGERY OF THE GENITOURINARY SYSTEM: Primary | ICD-10-CM

## 2024-10-01 DIAGNOSIS — N35.028 OTHER POST-TRAUMATIC URETHRAL STRICTURE, FEMALE: ICD-10-CM

## 2024-10-01 RX ORDER — OXYCODONE AND ACETAMINOPHEN 10; 325 MG/1; MG/1
1 TABLET ORAL EVERY 6 HOURS PRN
Qty: 20 TABLET | Refills: 0 | Status: SHIPPED | OUTPATIENT
Start: 2024-10-01

## 2024-10-01 RX ORDER — GENTAMICIN 40 MG/ML
80 INJECTION, SOLUTION INTRAMUSCULAR; INTRAVENOUS ONCE
Status: COMPLETED | OUTPATIENT
Start: 2024-10-01 | End: 2024-10-01

## 2024-10-01 RX ORDER — PROMETHAZINE HYDROCHLORIDE 25 MG/1
25 TABLET ORAL EVERY 6 HOURS PRN
Qty: 21 TABLET | Refills: 5 | Status: SHIPPED | OUTPATIENT
Start: 2024-10-01

## 2024-10-01 RX ADMIN — GENTAMICIN 80 MG: 40 INJECTION, SOLUTION INTRAMUSCULAR; INTRAVENOUS at 10:10

## 2024-10-01 NOTE — PROGRESS NOTES
Chief Complaint:      Chief Complaint   Patient presents with    Aftercare following surgery of the genitourinary system       HPI:   49 y.o. female here for urethral dilation secondary to a severe posttraumatic urethral stricture    Past Medical History:     Past Medical History:   Diagnosis Date    Hoyos's disease     Bipolar 1 disorder     Constipation     DDD (degenerative disc disease), cervical 05/29/2017    Fibromyalgia     IBS (irritable bowel syndrome)     Meningioma     Migraine     PONV (postoperative nausea and vomiting)     PTSD (post-traumatic stress disorder)     Rectal bleeding        Current Meds:     Current Outpatient Medications   Medication Sig Dispense Refill    albuterol (PROVENTIL HFA;VENTOLIN HFA) 108 (90 Base) MCG/ACT inhaler Inhale 2 puffs 2 (Two) Times a Day.      Azelastine HCl 137 MCG/SPRAY solution Administer 1 spray into the nostril(s) as directed by provider As Needed (Allergies).      busPIRone (BUSPAR) 15 MG tablet Take 1 tablet by mouth 3 (Three) Times a Day.      diclofenac (VOLTAREN) 1 % gel gel       divalproex (DEPAKOTE) 500 MG DR tablet Take 1 tablet by mouth 3 (Three) Times a Day. 90 tablet 2    docusate sodium (COLACE) 100 MG capsule Take 1 capsule by mouth 2 (Two) Times a Day. 20 capsule 0    fluticasone (VERAMYST) 27.5 MCG/SPRAY nasal spray Administer 2 sprays into the nostril(s) as directed by provider Daily.      magnesium 30 MG tablet Take 1 tablet by mouth Daily. 10 tablet 0    megestrol (MEGACE) 20 MG tablet Take 1 tablet by mouth Daily. 30 tablet 3    methylPREDNISolone (MEDROL) 4 MG dose pack Take as directed on package instructions. 21 tablet 0    metroNIDAZOLE (FLAGYL) 500 MG tablet Take 1 tablet by mouth 3 (Three) Times a Day. 30 tablet 0    montelukast (SINGULAIR) 10 MG tablet Take 1 tablet by mouth Every Night.      naloxone (NARCAN) 4 MG/0.1ML nasal spray Call 911. Don't prime. [place] in 1 nostril for overdose. Repeat in 2-3 minutes in other nostril if no or  minimal breathing/responsiveness. 2 each 0    nitrofurantoin, macrocrystal-monohydrate, (MACROBID) 100 MG capsule Take 1 capsule by mouth 2 (Two) Times a Day. 13 capsule 0    ondansetron ODT (ZOFRAN-ODT) 4 MG disintegrating tablet Place 2 tablets on the tongue Every 8 (Eight) Hours As Needed for Nausea or Vomiting. 30 tablet 0    oxyCODONE-acetaminophen (Percocet)  MG per tablet Take 1 tablet by mouth Every 6 (Six) Hours As Needed for Moderate Pain. 20 tablet 0    pantoprazole (PROTONIX) 40 MG EC tablet Take 1 tablet by mouth 2 (Two) Times a Day As Needed.      phenazopyridine (PYRIDIUM) 100 MG tablet Take 1 tablet by mouth 3 (Three) Times a Day As Needed for Bladder Spasms. 30 tablet 1    polyethylene glycol (MIRALAX) 17 GM/SCOOP powder Take 17 g by mouth Daily. 225 g 0    potassium chloride (KLOR-CON M20) 20 MEQ CR tablet Take 1 tablet by mouth Daily. 10 tablet 0    promethazine (PHENERGAN) 25 MG tablet Take 1 tablet by mouth Every 6 (Six) Hours As Needed for Nausea or Vomiting. 21 tablet 5    promethazine (PHENERGAN) 25 MG tablet Take 1 tablet by mouth Every 6 (Six) Hours As Needed for Nausea or Vomiting. 28 tablet 4    sertraline (ZOLOFT) 100 MG tablet Take 1 tablet by mouth 2 (Two) Times a Day.      SUMAtriptan (IMITREX) 6 MG/0.5ML injection Inject prescribed dose at onset of headache. May repeat dose one time in 1 hour(s) if headache not relieved.      traMADol (ULTRAM) 50 MG tablet Take 1 tablet by mouth Every 8 (Eight) Hours As Needed for Moderate Pain. 12 tablet 0    terazosin (HYTRIN) 2 MG capsule Take 1 capsule by mouth Every Night for 14 days. 14 capsule 0     No current facility-administered medications for this visit.        Allergies:      Allergies   Allergen Reactions    Ativan [Lorazepam] Hallucinations     confusion    Sulfa Antibiotics Shortness Of Breath and Swelling    Sulfa Antibiotics Anaphylaxis    Reglan [Metoclopramide] Angioedema    Compazine [Prochlorperazine Edisylate] Hives     Demerol [Meperidine] Hives    Droperidol Itching    Metoclopramide Swelling    Toradol [Ketorolac Tromethamine] Hives and Itching    Toradol [Ketorolac Tromethamine] Hives    Zosyn [Piperacillin Sod-Tazobactam So] Hives        Past Surgical History:     Past Surgical History:   Procedure Laterality Date    ANAL SCOPE N/A 2016    Procedure: ANAL SCOPE;  Surgeon: Kael Lopez MD;  Location: [facility] OR;  Service:     APPENDECTOMY      COLONOSCOPY N/A 2016    Procedure: COLONOSCOPY  CPTCODE:79770;  Surgeon: Jose Antonio Belle III, MD;  Location: [facility] OR;  Service:     COLONOSCOPY N/A 2016    Procedure: COLONOSCOPY (60257) CPT;  Surgeon: Jose Antonio Belle III, MD;  Location: [facility] OR;  Service:     CYSTOSCOPY RETROGRADE PYELOGRAM Bilateral 2017    Procedure: CYSTOSCOPY RETROGRADE PYELOGRAM;  Surgeon: Mu Blunt MD;  Location: [facility] OR;  Service:     ENDOSCOPY N/A 2016    Procedure: ESOPHAGOGASTRODUODENOSCOPY WITH BIOPSY  CPTCODE:99763;  Surgeon: Jose Antonio Belle III, MD;  Location: [facility] OR;  Service:     HEMORRHOIDECTOMY N/A 2016    Procedure: HEMORRHOID STAPLING;  Surgeon: Kael Lopez MD;  Location: [facility] OR;  Service:     HYSTERECTOMY      KNEE SURGERY      LAPAROSCOPIC SALPINGOOPHERECTOMY      PORTACATH PLACEMENT N/A 2017    Procedure: INSERTION OF PORTACATH;  Surgeon: Celso Arredondo MD;  Location: [facility] OR;  Service:     SHOULDER SURGERY      3 times       Social History:     Social History     Socioeconomic History    Marital status:    Tobacco Use    Smoking status: Former     Current packs/day: 0.00     Average packs/day: 1 pack/day for 20.0 years (20.0 ttl pk-yrs)     Types: Cigarettes     Start date: 1995     Quit date: 2015     Years since quittin.9     Passive exposure: Past    Smokeless tobacco: Never   Vaping Use    Vaping status: Never Used   Substance and Sexual Activity    Alcohol use: No    Drug use: Yes      Types: Marijuana     Comment: for pain    Sexual activity: Defer     Birth control/protection: Surgical       Family History:     Family History   Problem Relation Age of Onset    Crohn's disease Other     Hypertension Other     Diabetes Other     Irritable bowel syndrome Other     No Known Problems Father     No Known Problems Mother        Review of Systems:     Review of Systems   Constitutional: Negative.  Negative for activity change, appetite change, chills, diaphoresis, fatigue and unexpected weight change.   HENT:  Negative for congestion, dental problem, drooling, ear discharge, ear pain, facial swelling, hearing loss, mouth sores, nosebleeds, postnasal drip, rhinorrhea, sinus pressure, sneezing, sore throat, tinnitus, trouble swallowing and voice change.    Eyes: Negative.  Negative for photophobia, pain, discharge, redness, itching and visual disturbance.   Respiratory: Negative.  Negative for apnea, cough, choking, chest tightness, shortness of breath, wheezing and stridor.    Cardiovascular: Negative.  Negative for chest pain, palpitations and leg swelling.   Gastrointestinal: Negative.  Negative for abdominal distention, abdominal pain, anal bleeding, blood in stool, constipation, diarrhea, nausea, rectal pain and vomiting.   Endocrine: Negative.  Negative for cold intolerance, heat intolerance, polydipsia, polyphagia and polyuria.   Genitourinary:  Positive for difficulty urinating.   Musculoskeletal: Negative.  Negative for arthralgias, back pain, gait problem, joint swelling, myalgias, neck pain and neck stiffness.   Skin: Negative.  Negative for color change, pallor, rash and wound.   Allergic/Immunologic: Negative.  Negative for environmental allergies, food allergies and immunocompromised state.   Neurological: Negative.  Negative for dizziness, tremors, seizures, syncope, facial asymmetry, speech difficulty, weakness, light-headedness, numbness and headaches.   Hematological: Negative.  Negative  for adenopathy. Does not bruise/bleed easily.   Psychiatric/Behavioral:  Negative for agitation, behavioral problems, confusion, decreased concentration, dysphoric mood, hallucinations, self-injury, sleep disturbance and suicidal ideas. The patient is not nervous/anxious and is not hyperactive.    All other systems reviewed and are negative.      Physical Exam:     Physical Exam  Constitutional:       Appearance: She is well-developed.   HENT:      Head: Normocephalic and atraumatic.      Right Ear: External ear normal.      Left Ear: External ear normal.   Eyes:      Conjunctiva/sclera: Conjunctivae normal.      Pupils: Pupils are equal, round, and reactive to light.   Cardiovascular:      Rate and Rhythm: Normal rate and regular rhythm.      Heart sounds: Normal heart sounds.   Pulmonary:      Effort: Pulmonary effort is normal.      Breath sounds: Normal breath sounds.   Abdominal:      General: Bowel sounds are normal. There is no distension.      Palpations: Abdomen is soft. There is no mass.      Tenderness: There is no abdominal tenderness. There is no guarding or rebound.   Genitourinary:     Vagina: No vaginal discharge.   Musculoskeletal:         General: Normal range of motion.   Skin:     General: Skin is warm and dry.   Neurological:      Mental Status: She is alert.      Deep Tendon Reflexes: Reflexes are normal and symmetric.   Psychiatric:         Behavior: Behavior normal.         Thought Content: Thought content normal.         Judgment: Judgment normal.         I have reviewed the following portions of the patient's history: Allergies, current medications, past family history, past medical history, past social history, past surgical history, problem list, and ROS and confirm it is accurate.    Recent Image (CT and/or KUB):      CT Abdomen and Pelvis: No results found for this or any previous visit.       CT Stone Protocol: Results for orders placed during the hospital encounter of 10/10/23    CT  Abdomen Pelvis Stone Protocol    Narrative  CT ABDOMEN PELVIS STONE PROTOCOL-: 10/10/2023 9:19 PM    REASON FOR EXAM: Flank pain, kidney stone suspected LT FLANK PAIN.    TECHNIQUE: Noncontrast 4 mm axial sections with sagittal and coronal  reformats.    COMPARISON: 3/16/2023.    FINDINGS: There is focal bronchiectasis within the right lower lobe  (image 4/59), now with probable mucous plugging.  Noting the noncontrast  technique, the liver is unremarkable.  No focal liver lesions are  identified.  Best demonstrated on narrow windows, there is the  suggestion of cholelithiasis dependently within the gallbladder.  There  is no biliary ductal dilatation.  The spleen is at the upper limits of  normal in size.  The pancreas, adrenal glands and kidneys are within  normal limits, noting a probable cyst within the lower pole of the right  kidney (image 3/37).  There is no nephrolithiasis.  There is no abnormal  dilatation of either renal collecting system or ureter.  There is no  ureterolithiasis.  The abdominal aorta is normal in caliber, noting mild  vascular calcification.  There is a surgical anastomosis at the  rectoanal junction.  No complication is evident.  There are no findings  of bowel obstruction.  Noting the lack of enteric contrast material,  there is no abnormal bowel wall thickening.  The appendix is not  identified, however there are no findings of appendicitis.  There is no  free intraperitoneal fluid.  The uterus is absent.  The ovaries are not  identified.  There are no abnormal adnexal masses.  There is no lymph  node enlargement.  The bladder is unremarkable.  There is an old  fracture of L2.    Impression  1.  No acute abnormality of the abdomen or pelvis.  There is no  nephrolithiasis or ureterolithiasis.  2.  Incidental findings, as above, similar in appearance to 3/16/2023.      This report was finalized on 10/10/2023 11:05 PM by Steph Fabian MD.       KUB: Results for orders placed during the  hospital encounter of 01/10/23    XR Abdomen KUB    Narrative  EXAM:  XR Abdomen, 1 View    EXAM DATE:  1/10/2023 12:05 PM    CLINICAL HISTORY:  flank pain    TECHNIQUE:  Frontal supine view of the abdomen/pelvis.    COMPARISON:  10/01/2022    FINDINGS:  Gastrointestinal tract:  Unremarkable.  No dilation.  Organs:  No stones identified along the expected course of ureters.  Bones/joints:  Unremarkable.  Soft tissues:  Surgical staples in the lower pelvis.  Vasculature:  Stable phleboliths lower pelvis.    Impression  1.  No acute findings radiographically evident.  2.  No radiographic evidence of renal or ureteral stones.    This report was finalized on 1/10/2023 12:47 PM by Dr. Ankit Naik MD.       Labs (past 3 months):      Admission on 08/15/2024, Discharged on 08/16/2024   Component Date Value Ref Range Status    Glucose 08/15/2024 80  65 - 99 mg/dL Final    BUN 08/15/2024 6  6 - 20 mg/dL Final    Creatinine 08/15/2024 0.56 (L)  0.57 - 1.00 mg/dL Final    Sodium 08/15/2024 142  136 - 145 mmol/L Final    Potassium 08/15/2024 4.1  3.5 - 5.2 mmol/L Final    Slight hemolysis detected by analyzer. Result may be falsely elevated.    Chloride 08/15/2024 107  98 - 107 mmol/L Final    CO2 08/15/2024 25.2  22.0 - 29.0 mmol/L Final    Calcium 08/15/2024 9.1  8.6 - 10.5 mg/dL Final    Total Protein 08/15/2024 6.5  6.0 - 8.5 g/dL Final    Albumin 08/15/2024 4.1  3.5 - 5.2 g/dL Final    ALT (SGPT) 08/15/2024 58 (H)  1 - 33 U/L Final    AST (SGOT) 08/15/2024 47 (H)  1 - 32 U/L Final    Alkaline Phosphatase 08/15/2024 105  39 - 117 U/L Final    Total Bilirubin 08/15/2024 0.5  0.0 - 1.2 mg/dL Final    Globulin 08/15/2024 2.4  gm/dL Final    A/G Ratio 08/15/2024 1.7  g/dL Final    BUN/Creatinine Ratio 08/15/2024 10.7  7.0 - 25.0 Final    Anion Gap 08/15/2024 9.8  5.0 - 15.0 mmol/L Final    eGFR 08/15/2024 112.0  >60.0 mL/min/1.73 Final    Lipase 08/15/2024 24  13 - 60 U/L Final    Extra Tube 08/15/2024 Hold for add-ons.    Final    Auto resulted.    Extra Tube 08/15/2024 hold for add-on   Final    Auto resulted    Extra Tube 08/15/2024 Hold for add-ons.   Final    Auto resulted.    Extra Tube 08/15/2024 Hold for add-ons.   Final    Auto resulted    WBC 08/15/2024 6.59  3.40 - 10.80 10*3/mm3 Final    RBC 08/15/2024 5.17  3.77 - 5.28 10*6/mm3 Final    Hemoglobin 08/15/2024 16.2 (H)  12.0 - 15.9 g/dL Final    Hematocrit 08/15/2024 48.3 (H)  34.0 - 46.6 % Final    MCV 08/15/2024 93.4  79.0 - 97.0 fL Final    MCH 08/15/2024 31.3  26.6 - 33.0 pg Final    MCHC 08/15/2024 33.5  31.5 - 35.7 g/dL Final    RDW 08/15/2024 12.9  12.3 - 15.4 % Final    RDW-SD 08/15/2024 44.2  37.0 - 54.0 fl Final    MPV 08/15/2024 10.2  6.0 - 12.0 fL Final    Platelets 08/15/2024 159  140 - 450 10*3/mm3 Final    Neutrophil % 08/15/2024 56.5  42.7 - 76.0 % Final    Lymphocyte % 08/15/2024 34.3  19.6 - 45.3 % Final    Monocyte % 08/15/2024 6.8  5.0 - 12.0 % Final    Eosinophil % 08/15/2024 1.8  0.3 - 6.2 % Final    Basophil % 08/15/2024 0.3  0.0 - 1.5 % Final    Immature Grans % 08/15/2024 0.3  0.0 - 0.5 % Final    Neutrophils, Absolute 08/15/2024 3.72  1.70 - 7.00 10*3/mm3 Final    Lymphocytes, Absolute 08/15/2024 2.26  0.70 - 3.10 10*3/mm3 Final    Monocytes, Absolute 08/15/2024 0.45  0.10 - 0.90 10*3/mm3 Final    Eosinophils, Absolute 08/15/2024 0.12  0.00 - 0.40 10*3/mm3 Final    Basophils, Absolute 08/15/2024 0.02  0.00 - 0.20 10*3/mm3 Final    Immature Grans, Absolute 08/15/2024 0.02  0.00 - 0.05 10*3/mm3 Final    nRBC 08/15/2024 0.0  0.0 - 0.2 /100 WBC Final    Extra Tube 08/15/2024 hold for add-on   Final    Auto resulted        Procedure:   Urethral dilation-after an appropriate informed consent, the patient was brought to the procedure suite.  The urethra was gently anesthetized with 10 mL of 2% viscous Xylocaine jelly.  After an adequate period of topical anesthesia I went ahead and  dilated with [place] sounds from 16 to 30 [place] sequentially without  complication. The patient was given gentamicin as prophylaxis with 80 mg.    Assessment/Plan:   Posttraumatic urethral stricture status post dilation given gentamicin as prophylaxis  Narcotic pain medication-patient has significant acute pain that I believe would be an indication for the use of narcotic pain medication.  I discussed the significant risks of pain medication and the fact that this will be a short only option and I will give her no more than a three-day supply of pain medication, I will not plan long-term medication, and that this will be sent to a pain clinic if it at all becomes necessary.  We discussed signing a pain medication agreement and the fact that we're going to run a state [place] review to be sure the patient is not getting pain medication from elsewhere.  If this is the case, we will not give pain medication as part of the patient's treatment plan of there being prescribed a controlled substance with potential for abuse.  This patient has been well aware of the appropriate dose of such medications including the risks for somnolence, limited ability to drive and/or safety and the significant potential for overdose.  It has been made clear that these medications are for the prescribed patient only without concomitant use of alcohol or other substance unless prescribed by the medical provider.  Has completed prescribing agreement detailing the terms of continue prescribing him a controlled substance including monitoring [place] reports, the possibility of urine drug screens, and pill counts.  The patient is aware that we review [place] reports on a regular basis and scan them into the chart.  History and physical examination exhibited continued safe and appropriate use of controlled substances. We also discussed the fact that the new [place] legislation allows only a three-day prescription for pain medication.  In this situation he will be referred to a chronic pain clinic.            This  document has been electronically signed by VERENICE FINK MD October 1, 2024 09:01 EDT    Dictated Utilizing Dragon Dictation: Part of this note may be an electronic transcription/translation of spoken language to printed text using the Dragon Dictation System.

## 2024-11-05 ENCOUNTER — OFFICE VISIT (OUTPATIENT)
Dept: UROLOGY | Facility: CLINIC | Age: 49
End: 2024-11-05
Payer: COMMERCIAL

## 2024-11-05 VITALS
DIASTOLIC BLOOD PRESSURE: 95 MMHG | SYSTOLIC BLOOD PRESSURE: 143 MMHG | HEART RATE: 113 BPM | WEIGHT: 106 LBS | BODY MASS INDEX: 16.06 KG/M2 | HEIGHT: 68 IN

## 2024-11-05 DIAGNOSIS — F31.9 BIPOLAR 1 DISORDER: ICD-10-CM

## 2024-11-05 DIAGNOSIS — N35.028 OTHER POST-TRAUMATIC URETHRAL STRICTURE, FEMALE: ICD-10-CM

## 2024-11-05 DIAGNOSIS — N23 RENAL COLIC: ICD-10-CM

## 2024-11-05 DIAGNOSIS — Z48.816 AFTERCARE FOLLOWING SURGERY OF THE GENITOURINARY SYSTEM: Primary | ICD-10-CM

## 2024-11-05 RX ORDER — PROMETHAZINE HYDROCHLORIDE 25 MG/1
25 TABLET ORAL EVERY 6 HOURS PRN
Qty: 28 TABLET | Refills: 4 | Status: SHIPPED | OUTPATIENT
Start: 2024-11-05

## 2024-11-05 RX ORDER — MEGESTROL ACETATE 20 MG/1
20 TABLET ORAL DAILY
Qty: 30 TABLET | Refills: 3 | Status: SHIPPED | OUTPATIENT
Start: 2024-11-05

## 2024-11-05 RX ORDER — GENTAMICIN 40 MG/ML
80 INJECTION, SOLUTION INTRAMUSCULAR; INTRAVENOUS ONCE
Status: COMPLETED | OUTPATIENT
Start: 2024-11-05 | End: 2024-11-05

## 2024-11-05 RX ORDER — OXYCODONE AND ACETAMINOPHEN 10; 325 MG/1; MG/1
1 TABLET ORAL EVERY 6 HOURS PRN
Qty: 20 TABLET | Refills: 0 | Status: SHIPPED | OUTPATIENT
Start: 2024-11-05 | End: 2024-12-03 | Stop reason: SDUPTHER

## 2024-11-05 RX ADMIN — GENTAMICIN 80 MG: 40 INJECTION, SOLUTION INTRAMUSCULAR; INTRAVENOUS at 10:28

## 2024-12-02 NOTE — PROGRESS NOTES
Chief Complaint:   Posttraumatic urethral stricture    HPI:   49 y.o. female very well-known to us with a posttraumatic urethral stricture here for dilation today.    Past Medical History:     Past Medical History:   Diagnosis Date    Hoyos's disease     Bipolar 1 disorder     Constipation     DDD (degenerative disc disease), cervical 05/29/2017    Fibromyalgia     IBS (irritable bowel syndrome)     Meningioma     Migraine     PONV (postoperative nausea and vomiting)     PTSD (post-traumatic stress disorder)     Rectal bleeding        Current Meds:     Current Outpatient Medications   Medication Sig Dispense Refill    megestrol (MEGACE) 20 MG tablet Take 1 tablet by mouth Daily. 30 tablet 3    montelukast (SINGULAIR) 10 MG tablet Take 1 tablet by mouth Every Night.      naloxone (NARCAN) 4 MG/0.1ML nasal spray Call 911. Don't prime. [place] in 1 nostril for overdose. Repeat in 2-3 minutes in other nostril if no or minimal breathing/responsiveness. 2 each 0    ondansetron ODT (ZOFRAN-ODT) 4 MG disintegrating tablet Place 2 tablets on the tongue Every 8 (Eight) Hours As Needed for Nausea or Vomiting. 30 tablet 0    oxyCODONE-acetaminophen (Percocet)  MG per tablet Take 1 tablet by mouth Every 6 (Six) Hours As Needed for Moderate Pain. 20 tablet 0    phenazopyridine (PYRIDIUM) 100 MG tablet Take 1 tablet by mouth 3 (Three) Times a Day As Needed for Bladder Spasms. 30 tablet 1    promethazine (PHENERGAN) 25 MG tablet Take 1 tablet by mouth Every 6 (Six) Hours As Needed for Nausea or Vomiting. 21 tablet 5    promethazine (PHENERGAN) 25 MG tablet Take 1 tablet by mouth Every 6 (Six) Hours As Needed for Nausea or Vomiting. 28 tablet 4    SUMAtriptan (IMITREX) 6 MG/0.5ML injection Inject prescribed dose at onset of headache. May repeat dose one time in 1 hour(s) if headache not relieved.      traMADol (ULTRAM) 50 MG tablet Take 1 tablet by mouth Every 8 (Eight) Hours As Needed for Moderate Pain. 12 tablet 0     albuterol (PROVENTIL HFA;VENTOLIN HFA) 108 (90 Base) MCG/ACT inhaler Inhale 2 puffs 2 (Two) Times a Day. (Patient not taking: Reported on 11/5/2024)      Azelastine HCl 137 MCG/SPRAY solution Administer 1 spray into the nostril(s) as directed by provider As Needed (Allergies). (Patient not taking: Reported on 11/5/2024)      busPIRone (BUSPAR) 15 MG tablet Take 1 tablet by mouth 3 (Three) Times a Day. (Patient not taking: Reported on 11/5/2024)      diclofenac (VOLTAREN) 1 % gel gel  (Patient not taking: Reported on 11/5/2024)      divalproex (DEPAKOTE) 500 MG DR tablet Take 1 tablet by mouth 3 (Three) Times a Day. (Patient not taking: Reported on 11/5/2024) 90 tablet 2    docusate sodium (COLACE) 100 MG capsule Take 1 capsule by mouth 2 (Two) Times a Day. (Patient not taking: Reported on 11/5/2024) 20 capsule 0    fluticasone (VERAMYST) 27.5 MCG/SPRAY nasal spray Administer 2 sprays into the nostril(s) as directed by provider Daily. (Patient not taking: Reported on 11/5/2024)      magnesium 30 MG tablet Take 1 tablet by mouth Daily. (Patient not taking: Reported on 11/5/2024) 10 tablet 0    methylPREDNISolone (MEDROL) 4 MG dose pack Take as directed on package instructions. (Patient not taking: Reported on 11/5/2024) 21 tablet 0    metroNIDAZOLE (FLAGYL) 500 MG tablet Take 1 tablet by mouth 3 (Three) Times a Day. (Patient not taking: Reported on 11/5/2024) 30 tablet 0    nitrofurantoin, macrocrystal-monohydrate, (MACROBID) 100 MG capsule Take 1 capsule by mouth 2 (Two) Times a Day. (Patient not taking: Reported on 11/5/2024) 13 capsule 0    pantoprazole (PROTONIX) 40 MG EC tablet Take 1 tablet by mouth 2 (Two) Times a Day As Needed. (Patient not taking: Reported on 11/5/2024)      polyethylene glycol (MIRALAX) 17 GM/SCOOP powder Take 17 g by mouth Daily. (Patient not taking: Reported on 11/5/2024) 225 g 0    potassium chloride (KLOR-CON M20) 20 MEQ CR tablet Take 1 tablet by mouth Daily. (Patient not taking:  Reported on 11/5/2024) 10 tablet 0    sertraline (ZOLOFT) 100 MG tablet Take 1 tablet by mouth 2 (Two) Times a Day. (Patient not taking: Reported on 11/5/2024)      terazosin (HYTRIN) 2 MG capsule Take 1 capsule by mouth Every Night for 14 days. 14 capsule 0     No current facility-administered medications for this visit.        Allergies:      Allergies   Allergen Reactions    Ativan [Lorazepam] Hallucinations     confusion    Sulfa Antibiotics Shortness Of Breath and Swelling    Sulfa Antibiotics Anaphylaxis    Reglan [Metoclopramide] Angioedema    Compazine [Prochlorperazine Edisylate] Hives    Demerol [Meperidine] Hives    Droperidol Itching    Metoclopramide Swelling    Toradol [Ketorolac Tromethamine] Hives and Itching    Toradol [Ketorolac Tromethamine] Hives    Zosyn [Piperacillin Sod-Tazobactam So] Hives        Past Surgical History:     Past Surgical History:   Procedure Laterality Date    ANAL SCOPE N/A 7/28/2016    Procedure: ANAL SCOPE;  Surgeon: Kael Lopez MD;  Location: [facility] OR;  Service:     APPENDECTOMY      COLONOSCOPY N/A 6/30/2016    Procedure: COLONOSCOPY  CPTCODE:72460;  Surgeon: Jose Antonio Belle III, MD;  Location: [facility] OR;  Service:     COLONOSCOPY N/A 7/7/2016    Procedure: COLONOSCOPY (01361) CPT;  Surgeon: Jose Antonio Belle III, MD;  Location: [facility] OR;  Service:     CYSTOSCOPY RETROGRADE PYELOGRAM Bilateral 4/28/2017    Procedure: CYSTOSCOPY RETROGRADE PYELOGRAM;  Surgeon: Mu Blunt MD;  Location: [facility] OR;  Service:     ENDOSCOPY N/A 6/30/2016    Procedure: ESOPHAGOGASTRODUODENOSCOPY WITH BIOPSY  CPTCODE:27898;  Surgeon: Jose Antonio Belle III, MD;  Location: [facility] OR;  Service:     HEMORRHOIDECTOMY N/A 7/28/2016    Procedure: HEMORRHOID STAPLING;  Surgeon: Kael Lopez MD;  Location: [facility] OR;  Service:     HYSTERECTOMY      KNEE SURGERY      LAPAROSCOPIC SALPINGOOPHERECTOMY      PORTACATH PLACEMENT N/A 7/28/2017    Procedure: INSERTION OF PORTACATH;   Surgeon: Celso Arredondo MD;  Location: [facility];  Service:     SHOULDER SURGERY      3 times       Social History:     Social History     Socioeconomic History    Marital status:    Tobacco Use    Smoking status: Former     Current packs/day: 0.00     Average packs/day: 1 pack/day for 20.0 years (20.0 ttl pk-yrs)     Types: Cigarettes     Start date: 1995     Quit date: 2015     Years since quittin.0     Passive exposure: Past    Smokeless tobacco: Never   Vaping Use    Vaping status: Never Used   Substance and Sexual Activity    Alcohol use: No    Drug use: Yes     Types: Marijuana     Comment: for pain    Sexual activity: Defer     Birth control/protection: Surgical       Family History:     Family History   Problem Relation Age of Onset    Crohn's disease Other     Hypertension Other     Diabetes Other     Irritable bowel syndrome Other     No Known Problems Father     No Known Problems Mother        Review of Systems:     Review of Systems   Constitutional: Negative.  Negative for activity change, appetite change, chills, diaphoresis, fatigue and unexpected weight change.   HENT:  Negative for congestion, dental problem, drooling, ear discharge, ear pain, facial swelling, hearing loss, mouth sores, nosebleeds, postnasal drip, rhinorrhea, sinus pressure, sneezing, sore throat, tinnitus, trouble swallowing and voice change.    Eyes: Negative.  Negative for photophobia, pain, discharge, redness, itching and visual disturbance.   Respiratory: Negative.  Negative for apnea, cough, choking, chest tightness, shortness of breath, wheezing and stridor.    Cardiovascular: Negative.  Negative for chest pain, palpitations and leg swelling.   Gastrointestinal: Negative.  Negative for abdominal distention, abdominal pain, anal bleeding, blood in stool, constipation, diarrhea, nausea, rectal pain and vomiting.   Endocrine: Negative.  Negative for cold intolerance, heat intolerance, polydipsia,  polyphagia and polyuria.   Musculoskeletal: Negative.  Negative for arthralgias, back pain, gait problem, joint swelling, myalgias, neck pain and neck stiffness.   Skin: Negative.  Negative for color change, pallor, rash and wound.   Allergic/Immunologic: Negative.  Negative for environmental allergies, food allergies and immunocompromised state.   Neurological: Negative.  Negative for dizziness, tremors, seizures, syncope, facial asymmetry, speech difficulty, weakness, light-headedness, numbness and headaches.   Hematological: Negative.  Negative for adenopathy. Does not bruise/bleed easily.   Psychiatric/Behavioral:  Negative for agitation, behavioral problems, confusion, decreased concentration, dysphoric mood, hallucinations, self-injury, sleep disturbance and suicidal ideas. The patient is not nervous/anxious and is not hyperactive.    All other systems reviewed and are negative.      Physical Exam:     Physical Exam  Constitutional:       Appearance: She is well-developed.   HENT:      Head: Normocephalic and atraumatic.      Right Ear: External ear normal.      Left Ear: External ear normal.   Eyes:      Conjunctiva/sclera: Conjunctivae normal.      Pupils: Pupils are equal, round, and reactive to light.   Cardiovascular:      Rate and Rhythm: Normal rate and regular rhythm.      Heart sounds: Normal heart sounds.   Pulmonary:      Effort: Pulmonary effort is normal.      Breath sounds: Normal breath sounds.   Abdominal:      General: Bowel sounds are normal. There is no distension.      Palpations: Abdomen is soft. There is no mass.      Tenderness: There is no abdominal tenderness. There is no guarding or rebound.   Genitourinary:     Vagina: No vaginal discharge.   Musculoskeletal:         General: Normal range of motion.   Skin:     General: Skin is warm and dry.   Neurological:      Mental Status: She is alert.      Deep Tendon Reflexes: Reflexes are normal and symmetric.   Psychiatric:         Behavior:  Behavior normal.         Thought Content: Thought content normal.         Judgment: Judgment normal.         I have reviewed the following portions of the patient's history: Allergies, current medications, past family history, past medical history, past social history, past surgical history, problem list, and ROS and confirm it is accurate.    Recent Image (CT and/or KUB):      CT Abdomen and Pelvis: No results found for this or any previous visit.       CT Stone Protocol: Results for orders placed during the hospital encounter of 10/10/23    CT Abdomen Pelvis Stone Protocol    Narrative  CT ABDOMEN PELVIS STONE PROTOCOL-: 10/10/2023 9:19 PM    REASON FOR EXAM: Flank pain, kidney stone suspected LT FLANK PAIN.    TECHNIQUE: Noncontrast 4 mm axial sections with sagittal and coronal  reformats.    COMPARISON: 3/16/2023.    FINDINGS: There is focal bronchiectasis within the right lower lobe  (image 4/59), now with probable mucous plugging.  Noting the noncontrast  technique, the liver is unremarkable.  No focal liver lesions are  identified.  Best demonstrated on narrow windows, there is the  suggestion of cholelithiasis dependently within the gallbladder.  There  is no biliary ductal dilatation.  The spleen is at the upper limits of  normal in size.  The pancreas, adrenal glands and kidneys are within  normal limits, noting a probable cyst within the lower pole of the right  kidney (image 3/37).  There is no nephrolithiasis.  There is no abnormal  dilatation of either renal collecting system or ureter.  There is no  ureterolithiasis.  The abdominal aorta is normal in caliber, noting mild  vascular calcification.  There is a surgical anastomosis at the  rectoanal junction.  No complication is evident.  There are no findings  of bowel obstruction.  Noting the lack of enteric contrast material,  there is no abnormal bowel wall thickening.  The appendix is not  identified, however there are no findings of appendicitis.   There is no  free intraperitoneal fluid.  The uterus is absent.  The ovaries are not  identified.  There are no abnormal adnexal masses.  There is no lymph  node enlargement.  The bladder is unremarkable.  There is an old  fracture of L2.    Impression  1.  No acute abnormality of the abdomen or pelvis.  There is no  nephrolithiasis or ureterolithiasis.  2.  Incidental findings, as above, similar in appearance to 3/16/2023.      This report was finalized on 10/10/2023 11:05 PM by Steph Fabian MD.       KUB: Results for orders placed during the hospital encounter of 01/10/23    XR Abdomen KUB    Narrative  EXAM:  XR Abdomen, 1 View    EXAM DATE:  1/10/2023 12:05 PM    CLINICAL HISTORY:  flank pain    TECHNIQUE:  Frontal supine view of the abdomen/pelvis.    COMPARISON:  10/01/2022    FINDINGS:  Gastrointestinal tract:  Unremarkable.  No dilation.  Organs:  No stones identified along the expected course of ureters.  Bones/joints:  Unremarkable.  Soft tissues:  Surgical staples in the lower pelvis.  Vasculature:  Stable phleboliths lower pelvis.    Impression  1.  No acute findings radiographically evident.  2.  No radiographic evidence of renal or ureteral stones.    This report was finalized on 1/10/2023 12:47 PM by Dr. Ankit Naik MD.       Labs (past 3 months):      Admission on 08/15/2024, Discharged on 08/16/2024   Component Date Value Ref Range Status    Glucose 08/15/2024 80  65 - 99 mg/dL Final    BUN 08/15/2024 6  6 - 20 mg/dL Final    Creatinine 08/15/2024 0.56 (L)  0.57 - 1.00 mg/dL Final    Sodium 08/15/2024 142  136 - 145 mmol/L Final    Potassium 08/15/2024 4.1  3.5 - 5.2 mmol/L Final    Slight hemolysis detected by analyzer. Result may be falsely elevated.    Chloride 08/15/2024 107  98 - 107 mmol/L Final    CO2 08/15/2024 25.2  22.0 - 29.0 mmol/L Final    Calcium 08/15/2024 9.1  8.6 - 10.5 mg/dL Final    Total Protein 08/15/2024 6.5  6.0 - 8.5 g/dL Final    Albumin 08/15/2024 4.1  3.5 - 5.2  g/dL Final    ALT (SGPT) 08/15/2024 58 (H)  1 - 33 U/L Final    AST (SGOT) 08/15/2024 47 (H)  1 - 32 U/L Final    Alkaline Phosphatase 08/15/2024 105  39 - 117 U/L Final    Total Bilirubin 08/15/2024 0.5  0.0 - 1.2 mg/dL Final    Globulin 08/15/2024 2.4  gm/dL Final    A/G Ratio 08/15/2024 1.7  g/dL Final    BUN/Creatinine Ratio 08/15/2024 10.7  7.0 - 25.0 Final    Anion Gap 08/15/2024 9.8  5.0 - 15.0 mmol/L Final    eGFR 08/15/2024 112.0  >60.0 mL/min/1.73 Final    Lipase 08/15/2024 24  13 - 60 U/L Final    Extra Tube 08/15/2024 Hold for add-ons.   Final    Auto resulted.    Extra Tube 08/15/2024 hold for add-on   Final    Auto resulted    Extra Tube 08/15/2024 Hold for add-ons.   Final    Auto resulted.    Extra Tube 08/15/2024 Hold for add-ons.   Final    Auto resulted    WBC 08/15/2024 6.59  3.40 - 10.80 10*3/mm3 Final    RBC 08/15/2024 5.17  3.77 - 5.28 10*6/mm3 Final    Hemoglobin 08/15/2024 16.2 (H)  12.0 - 15.9 g/dL Final    Hematocrit 08/15/2024 48.3 (H)  34.0 - 46.6 % Final    MCV 08/15/2024 93.4  79.0 - 97.0 fL Final    MCH 08/15/2024 31.3  26.6 - 33.0 pg Final    MCHC 08/15/2024 33.5  31.5 - 35.7 g/dL Final    RDW 08/15/2024 12.9  12.3 - 15.4 % Final    RDW-SD 08/15/2024 44.2  37.0 - 54.0 fl Final    MPV 08/15/2024 10.2  6.0 - 12.0 fL Final    Platelets 08/15/2024 159  140 - 450 10*3/mm3 Final    Neutrophil % 08/15/2024 56.5  42.7 - 76.0 % Final    Lymphocyte % 08/15/2024 34.3  19.6 - 45.3 % Final    Monocyte % 08/15/2024 6.8  5.0 - 12.0 % Final    Eosinophil % 08/15/2024 1.8  0.3 - 6.2 % Final    Basophil % 08/15/2024 0.3  0.0 - 1.5 % Final    Immature Grans % 08/15/2024 0.3  0.0 - 0.5 % Final    Neutrophils, Absolute 08/15/2024 3.72  1.70 - 7.00 10*3/mm3 Final    Lymphocytes, Absolute 08/15/2024 2.26  0.70 - 3.10 10*3/mm3 Final    Monocytes, Absolute 08/15/2024 0.45  0.10 - 0.90 10*3/mm3 Final    Eosinophils, Absolute 08/15/2024 0.12  0.00 - 0.40 10*3/mm3 Final    Basophils, Absolute 08/15/2024 0.02   0.00 - 0.20 10*3/mm3 Final    Immature Grans, Absolute 08/15/2024 0.02  0.00 - 0.05 10*3/mm3 Final    nRBC 08/15/2024 0.0  0.0 - 0.2 /100 WBC Final    Extra Tube 08/15/2024 hold for add-on   Final    Auto resulted        Procedure:   Urethral dilation-after an appropriate informed consent, the patient was brought to the procedure suite.  The urethra was gently anesthetized with 10 mL of 2% viscous Xylocaine jelly.  After an adequate period of topical anesthesia I went ahead and advanced the e urethra was gently anesthetized and dilated with [place] sounds from 16 to 26 Portuguese sequentially without complication. The patient was given gentamicin as prophylaxis with 80 mg.    Assessment/Plan:   Posttraumatic urethral stricture-that is post dilation to 26 Portuguese.  Narcotic pain medication-patient has significant acute pain that I believe would be an indication for the use of narcotic pain medication.  I discussed the significant risks of pain medication and the fact that this will be a short only option and I will give her no more than a three-day supply of pain medication, I will not plan long-term medication, and that this will be sent to a pain clinic if it at all becomes necessary.  We discussed signing a pain medication agreement and the fact that we're going to run a state [facility] review to be sure the patient is not getting pain medication from elsewhere.  If this is the case, we will not give pain medication as part of the patient's treatment plan of there being prescribed a controlled substance with potential for abuse.  This patient has been well aware of the appropriate dose of such medications including the risks for somnolence, limited ability to drive and/or safety and the significant potential for overdose.  It has been made clear that these medications are for the prescribed patient only without concomitant use of alcohol or other substance unless prescribed by the medical provider.  Has completed  prescribing agreement detailing the terms of continue prescribing him a controlled substance including monitoring [facility] reports, the possibility of urine drug screens, and pill counts.  The patient is aware that we review LU[facility] ALBERTO reports on a regular basis and scan them into the chart.  History and physical examination exhibited continued safe and appropriate use of controlled substances. We also discussed the fact that the new [place] legislation allows only a three-day prescription for pain medication.  In this situation he will be referred to a chronic pain clinic.            This document has been electronically signed by VERENICE FINK MD December 2, 2024 10:05 EST    Dictated Utilizing Dragon Dictation: Part of this note may be an electronic transcription/translation of spoken language to printed text using the Dragon Dictation System.

## 2024-12-03 ENCOUNTER — OFFICE VISIT (OUTPATIENT)
Dept: UROLOGY | Facility: CLINIC | Age: 49
End: 2024-12-03
Payer: COMMERCIAL

## 2024-12-03 VITALS
SYSTOLIC BLOOD PRESSURE: 134 MMHG | WEIGHT: 106 LBS | HEART RATE: 91 BPM | BODY MASS INDEX: 16.06 KG/M2 | DIASTOLIC BLOOD PRESSURE: 76 MMHG | HEIGHT: 68 IN

## 2024-12-03 DIAGNOSIS — N35.028 OTHER POST-TRAUMATIC URETHRAL STRICTURE, FEMALE: ICD-10-CM

## 2024-12-03 DIAGNOSIS — Z48.816 AFTERCARE FOLLOWING SURGERY OF THE GENITOURINARY SYSTEM: Primary | ICD-10-CM

## 2024-12-03 DIAGNOSIS — N23 RENAL COLIC: ICD-10-CM

## 2024-12-03 DIAGNOSIS — R35.0 FREQUENCY OF MICTURITION: ICD-10-CM

## 2024-12-03 RX ORDER — AZITHROMYCIN 250 MG/1
TABLET, FILM COATED ORAL
Qty: 6 TABLET | Refills: 0 | Status: SHIPPED | OUTPATIENT
Start: 2024-12-03 | End: 2024-12-08

## 2024-12-03 RX ORDER — PROMETHAZINE HYDROCHLORIDE 25 MG/1
25 TABLET ORAL EVERY 6 HOURS PRN
Qty: 21 TABLET | Refills: 5 | Status: SHIPPED | OUTPATIENT
Start: 2024-12-03

## 2024-12-03 RX ORDER — OXYCODONE AND ACETAMINOPHEN 10; 325 MG/1; MG/1
1 TABLET ORAL EVERY 6 HOURS PRN
Qty: 20 TABLET | Refills: 0 | Status: SHIPPED | OUTPATIENT
Start: 2024-12-03

## 2024-12-03 RX ORDER — GENTAMICIN 40 MG/ML
80 INJECTION, SOLUTION INTRAMUSCULAR; INTRAVENOUS ONCE
Status: COMPLETED | OUTPATIENT
Start: 2024-12-03 | End: 2024-12-03

## 2024-12-03 RX ADMIN — GENTAMICIN 80 MG: 40 INJECTION, SOLUTION INTRAMUSCULAR; INTRAVENOUS at 09:11

## 2024-12-03 NOTE — PROGRESS NOTES
Chief Complaint:      Chief Complaint   Patient presents with    Posttraumatic urethral stricture       HPI:   49 y.o. female with posttraumatic stricture for dilation.    Past Medical History:     Past Medical History:   Diagnosis Date    Hoyos's disease     Bipolar 1 disorder     Constipation     DDD (degenerative disc disease), cervical 05/29/2017    Fibromyalgia     IBS (irritable bowel syndrome)     Meningioma     Migraine     PONV (postoperative nausea and vomiting)     PTSD (post-traumatic stress disorder)     Rectal bleeding        Current Meds:     Current Outpatient Medications   Medication Sig Dispense Refill    albuterol (PROVENTIL HFA;VENTOLIN HFA) 108 (90 Base) MCG/ACT inhaler Inhale 2 puffs 2 (Two) Times a Day.      Azelastine HCl 137 MCG/SPRAY solution Administer 1 spray into the nostril(s) as directed by provider As Needed (Allergies).      busPIRone (BUSPAR) 15 MG tablet Take 1 tablet by mouth 3 (Three) Times a Day.      diclofenac (VOLTAREN) 1 % gel gel       divalproex (DEPAKOTE) 500 MG DR tablet Take 1 tablet by mouth 3 (Three) Times a Day. 90 tablet 2    docusate sodium (COLACE) 100 MG capsule Take 1 capsule by mouth 2 (Two) Times a Day. 20 capsule 0    fluticasone (VERAMYST) 27.5 MCG/SPRAY nasal spray Administer 2 sprays into the nostril(s) as directed by provider Daily.      magnesium 30 MG tablet Take 1 tablet by mouth Daily. 10 tablet 0    megestrol (MEGACE) 20 MG tablet Take 1 tablet by mouth Daily. 30 tablet 3    methylPREDNISolone (MEDROL) 4 MG dose pack Take as directed on package instructions. 21 tablet 0    metroNIDAZOLE (FLAGYL) 500 MG tablet Take 1 tablet by mouth 3 (Three) Times a Day. 30 tablet 0    montelukast (SINGULAIR) 10 MG tablet Take 1 tablet by mouth Every Night.      naloxone (NARCAN) 4 MG/0.1ML nasal spray Call 911. Don't prime. [place] in 1 nostril for overdose. Repeat in 2-3 minutes in other nostril if no or minimal breathing/responsiveness. 2 each 0    nitrofurantoin,  macrocrystal-monohydrate, (MACROBID) 100 MG capsule Take 1 capsule by mouth 2 (Two) Times a Day. 13 capsule 0    ondansetron ODT (ZOFRAN-ODT) 4 MG disintegrating tablet Place 2 tablets on the tongue Every 8 (Eight) Hours As Needed for Nausea or Vomiting. 30 tablet 0    oxyCODONE-acetaminophen (Percocet)  MG per tablet Take 1 tablet by mouth Every 6 (Six) Hours As Needed for Moderate Pain. 20 tablet 0    pantoprazole (PROTONIX) 40 MG EC tablet Take 1 tablet by mouth 2 (Two) Times a Day As Needed.      phenazopyridine (PYRIDIUM) 100 MG tablet Take 1 tablet by mouth 3 (Three) Times a Day As Needed for Bladder Spasms. 30 tablet 1    polyethylene glycol (MIRALAX) 17 GM/SCOOP powder Take 17 g by mouth Daily. 225 g 0    potassium chloride (KLOR-CON M20) 20 MEQ CR tablet Take 1 tablet by mouth Daily. 10 tablet 0    promethazine (PHENERGAN) 25 MG tablet Take 1 tablet by mouth Every 6 (Six) Hours As Needed for Nausea or Vomiting. 21 tablet 5    promethazine (PHENERGAN) 25 MG tablet Take 1 tablet by mouth Every 6 (Six) Hours As Needed for Nausea or Vomiting. 28 tablet 4    sertraline (ZOLOFT) 100 MG tablet Take 1 tablet by mouth 2 (Two) Times a Day.      SUMAtriptan (IMITREX) 6 MG/0.5ML injection Inject prescribed dose at onset of headache. May repeat dose one time in 1 hour(s) if headache not relieved.      traMADol (ULTRAM) 50 MG tablet Take 1 tablet by mouth Every 8 (Eight) Hours As Needed for Moderate Pain. 12 tablet 0    terazosin (HYTRIN) 2 MG capsule Take 1 capsule by mouth Every Night for 14 days. 14 capsule 0     No current facility-administered medications for this visit.        Allergies:      Allergies   Allergen Reactions    Ativan [Lorazepam] Hallucinations     confusion    Sulfa Antibiotics Shortness Of Breath and Swelling    Sulfa Antibiotics Anaphylaxis    Reglan [Metoclopramide] Angioedema    Compazine [Prochlorperazine Edisylate] Hives    Demerol [Meperidine] Hives    Droperidol Itching     Metoclopramide Swelling    Toradol [Ketorolac Tromethamine] Hives and Itching    Toradol [Ketorolac Tromethamine] Hives    Zosyn [Piperacillin Sod-Tazobactam So] Hives        Past Surgical History:     Past Surgical History:   Procedure Laterality Date    ANAL SCOPE N/A 2016    Procedure: ANAL SCOPE;  Surgeon: Kael Lopez MD;  Location: [facility] OR;  Service:     APPENDECTOMY      COLONOSCOPY N/A 2016    Procedure: COLONOSCOPY  CPTCODE:40536;  Surgeon: Jose Antonio Belle III, MD;  Location: [facility] OR;  Service:     COLONOSCOPY N/A 2016    Procedure: COLONOSCOPY (01327) CPT;  Surgeon: Jose Antonio Belle III, MD;  Location: [facility] OR;  Service:     CYSTOSCOPY RETROGRADE PYELOGRAM Bilateral 2017    Procedure: CYSTOSCOPY RETROGRADE PYELOGRAM;  Surgeon: Mu Blunt MD;  Location: [facility] OR;  Service:     ENDOSCOPY N/A 2016    Procedure: ESOPHAGOGASTRODUODENOSCOPY WITH BIOPSY  CPTCODE:05705;  Surgeon: Jose Antonio Belle III, MD;  Location: [facility] OR;  Service:     HEMORRHOIDECTOMY N/A 2016    Procedure: HEMORRHOID STAPLING;  Surgeon: Kael Lopez MD;  Location: [facility] OR;  Service:     HYSTERECTOMY      KNEE SURGERY      LAPAROSCOPIC SALPINGOOPHERECTOMY      PORTACATH PLACEMENT N/A 2017    Procedure: INSERTION OF PORTACATH;  Surgeon: Celso Arredondo MD;  Location: [facility] OR;  Service:     SHOULDER SURGERY      3 times       Social History:     Social History     Socioeconomic History    Marital status:    Tobacco Use    Smoking status: Former     Current packs/day: 0.00     Average packs/day: 1 pack/day for 20.0 years (20.0 ttl pk-yrs)     Types: Cigarettes     Start date: 1995     Quit date: 2015     Years since quittin.0     Passive exposure: Past    Smokeless tobacco: Never   Vaping Use    Vaping status: Never Used   Substance and Sexual Activity    Alcohol use: No    Drug use: Yes     Types: Marijuana     Comment: for pain    Sexual  activity: Defer     Birth control/protection: Surgical       Family History:     Family History   Problem Relation Age of Onset    Crohn's disease Other     Hypertension Other     Diabetes Other     Irritable bowel syndrome Other     No Known Problems Father     No Known Problems Mother        Review of Systems:     Review of Systems   Constitutional: Negative.  Negative for activity change, appetite change, chills, diaphoresis, fatigue and unexpected weight change.   HENT:  Negative for congestion, dental problem, drooling, ear discharge, ear pain, facial swelling, hearing loss, mouth sores, nosebleeds, postnasal drip, rhinorrhea, sinus pressure, sneezing, sore throat, tinnitus, trouble swallowing and voice change.    Eyes: Negative.  Negative for photophobia, pain, discharge, redness, itching and visual disturbance.   Respiratory: Negative.  Negative for apnea, cough, choking, chest tightness, shortness of breath, wheezing and stridor.    Cardiovascular: Negative.  Negative for chest pain, palpitations and leg swelling.   Gastrointestinal: Negative.  Negative for abdominal distention, abdominal pain, anal bleeding, blood in stool, constipation, diarrhea, nausea, rectal pain and vomiting.   Endocrine: Negative.  Negative for cold intolerance, heat intolerance, polydipsia, polyphagia and polyuria.   Genitourinary:  Positive for difficulty urinating.   Musculoskeletal: Negative.  Negative for arthralgias, back pain, gait problem, joint swelling, myalgias, neck pain and neck stiffness.   Skin: Negative.  Negative for color change, pallor, rash and wound.   Allergic/Immunologic: Negative.  Negative for environmental allergies, food allergies and immunocompromised state.   Neurological: Negative.  Negative for dizziness, tremors, seizures, syncope, facial asymmetry, speech difficulty, weakness, light-headedness, numbness and headaches.   Hematological: Negative.  Negative for adenopathy. Does not bruise/bleed easily.    Psychiatric/Behavioral:  Negative for agitation, behavioral problems, confusion, decreased concentration, dysphoric mood, hallucinations, self-injury, sleep disturbance and suicidal ideas. The patient is not nervous/anxious and is not hyperactive.    All other systems reviewed and are negative.      Physical Exam:     Physical Exam  Constitutional:       Appearance: She is well-developed.   HENT:      Head: Normocephalic and atraumatic.      Right Ear: External ear normal.      Left Ear: External ear normal.   Eyes:      Conjunctiva/sclera: Conjunctivae normal.      Pupils: Pupils are equal, round, and reactive to light.   Cardiovascular:      Rate and Rhythm: Normal rate and regular rhythm.      Heart sounds: Normal heart sounds.   Pulmonary:      Effort: Pulmonary effort is normal.      Breath sounds: Normal breath sounds.   Abdominal:      General: Bowel sounds are normal. There is no distension.      Palpations: Abdomen is soft. There is no mass.      Tenderness: There is no abdominal tenderness. There is no guarding or rebound.   Genitourinary:     General: Normal vulva.      Vagina: No vaginal discharge.   Musculoskeletal:         General: Normal range of motion.   Skin:     General: Skin is warm and dry.   Neurological:      Mental Status: She is alert.      Deep Tendon Reflexes: Reflexes are normal and symmetric.   Psychiatric:         Behavior: Behavior normal.         Thought Content: Thought content normal.         Judgment: Judgment normal.         I have reviewed the following portions of the patient's history: Allergies, current medications, past family history, past medical history, past social history, past surgical history, problem list, and ROS and confirm it is accurate.  I have reviewed the following portions of the patient's history: Allergies, current medications, past family history, past medical history, past social history, past surgical history, problem list, and ROS and confirm it is  accurate.    Recent Image (CT and/or KUB):      CT Abdomen and Pelvis: No results found for this or any previous visit.       CT Stone Protocol: Results for orders placed during the hospital encounter of 10/10/23    CT Abdomen Pelvis Stone Protocol    Narrative  CT ABDOMEN PELVIS STONE PROTOCOL-: 10/10/2023 9:19 PM    REASON FOR EXAM: Flank pain, kidney stone suspected LT FLANK PAIN.    TECHNIQUE: Noncontrast 4 mm axial sections with sagittal and coronal  reformats.    COMPARISON: 3/16/2023.    FINDINGS: There is focal bronchiectasis within the right lower lobe  (image 4/59), now with probable mucous plugging.  Noting the noncontrast  technique, the liver is unremarkable.  No focal liver lesions are  identified.  Best demonstrated on narrow windows, there is the  suggestion of cholelithiasis dependently within the gallbladder.  There  is no biliary ductal dilatation.  The spleen is at the upper limits of  normal in size.  The pancreas, adrenal glands and kidneys are within  normal limits, noting a probable cyst within the lower pole of the right  kidney (image 3/37).  There is no nephrolithiasis.  There is no abnormal  dilatation of either renal collecting system or ureter.  There is no  ureterolithiasis.  The abdominal aorta is normal in caliber, noting mild  vascular calcification.  There is a surgical anastomosis at the  rectoanal junction.  No complication is evident.  There are no findings  of bowel obstruction.  Noting the lack of enteric contrast material,  there is no abnormal bowel wall thickening.  The appendix is not  identified, however there are no findings of appendicitis.  There is no  free intraperitoneal fluid.  The uterus is absent.  The ovaries are not  identified.  There are no abnormal adnexal masses.  There is no lymph  node enlargement.  The bladder is unremarkable.  There is an old  fracture of L2.    Impression  1.  No acute abnormality of the abdomen or pelvis.  There is no  nephrolithiasis  or ureterolithiasis.  2.  Incidental findings, as above, similar in appearance to 3/16/2023.      This report was finalized on 10/10/2023 11:05 PM by Steph Fabian MD.       KUB: Results for orders placed during the hospital encounter of 01/10/23    XR Abdomen KUB    Narrative  EXAM:  XR Abdomen, 1 View    EXAM DATE:  1/10/2023 12:05 PM    CLINICAL HISTORY:  flank pain    TECHNIQUE:  Frontal supine view of the abdomen/pelvis.    COMPARISON:  10/01/2022    FINDINGS:  Gastrointestinal tract:  Unremarkable.  No dilation.  Organs:  No stones identified along the expected course of ureters.  Bones/joints:  Unremarkable.  Soft tissues:  Surgical staples in the lower pelvis.  Vasculature:  Stable phleboliths lower pelvis.    Impression  1.  No acute findings radiographically evident.  2.  No radiographic evidence of renal or ureteral stones.    This report was finalized on 1/10/2023 12:47 PM by Dr. Ankit Naik MD.       Labs (past 3 months):      No visits with results within 3 Month(s) from this visit.   Latest known visit with results is:   Admission on 08/15/2024, Discharged on 08/16/2024   Component Date Value Ref Range Status    Glucose 08/15/2024 80  65 - 99 mg/dL Final    BUN 08/15/2024 6  6 - 20 mg/dL Final    Creatinine 08/15/2024 0.56 (L)  0.57 - 1.00 mg/dL Final    Sodium 08/15/2024 142  136 - 145 mmol/L Final    Potassium 08/15/2024 4.1  3.5 - 5.2 mmol/L Final    Slight hemolysis detected by analyzer. Result may be falsely elevated.    Chloride 08/15/2024 107  98 - 107 mmol/L Final    CO2 08/15/2024 25.2  22.0 - 29.0 mmol/L Final    Calcium 08/15/2024 9.1  8.6 - 10.5 mg/dL Final    Total Protein 08/15/2024 6.5  6.0 - 8.5 g/dL Final    Albumin 08/15/2024 4.1  3.5 - 5.2 g/dL Final    ALT (SGPT) 08/15/2024 58 (H)  1 - 33 U/L Final    AST (SGOT) 08/15/2024 47 (H)  1 - 32 U/L Final    Alkaline Phosphatase 08/15/2024 105  39 - 117 U/L Final    Total Bilirubin 08/15/2024 0.5  0.0 - 1.2 mg/dL Final    Globulin  08/15/2024 2.4  gm/dL Final    A/G Ratio 08/15/2024 1.7  g/dL Final    BUN/Creatinine Ratio 08/15/2024 10.7  7.0 - 25.0 Final    Anion Gap 08/15/2024 9.8  5.0 - 15.0 mmol/L Final    eGFR 08/15/2024 112.0  >60.0 mL/min/1.73 Final    Lipase 08/15/2024 24  13 - 60 U/L Final    Extra Tube 08/15/2024 Hold for add-ons.   Final    Auto resulted.    Extra Tube 08/15/2024 hold for add-on   Final    Auto resulted    Extra Tube 08/15/2024 Hold for add-ons.   Final    Auto resulted.    Extra Tube 08/15/2024 Hold for add-ons.   Final    Auto resulted    WBC 08/15/2024 6.59  3.40 - 10.80 10*3/mm3 Final    RBC 08/15/2024 5.17  3.77 - 5.28 10*6/mm3 Final    Hemoglobin 08/15/2024 16.2 (H)  12.0 - 15.9 g/dL Final    Hematocrit 08/15/2024 48.3 (H)  34.0 - 46.6 % Final    MCV 08/15/2024 93.4  79.0 - 97.0 fL Final    MCH 08/15/2024 31.3  26.6 - 33.0 pg Final    MCHC 08/15/2024 33.5  31.5 - 35.7 g/dL Final    RDW 08/15/2024 12.9  12.3 - 15.4 % Final    RDW-SD 08/15/2024 44.2  37.0 - 54.0 fl Final    MPV 08/15/2024 10.2  6.0 - 12.0 fL Final    Platelets 08/15/2024 159  140 - 450 10*3/mm3 Final    Neutrophil % 08/15/2024 56.5  42.7 - 76.0 % Final    Lymphocyte % 08/15/2024 34.3  19.6 - 45.3 % Final    Monocyte % 08/15/2024 6.8  5.0 - 12.0 % Final    Eosinophil % 08/15/2024 1.8  0.3 - 6.2 % Final    Basophil % 08/15/2024 0.3  0.0 - 1.5 % Final    Immature Grans % 08/15/2024 0.3  0.0 - 0.5 % Final    Neutrophils, Absolute 08/15/2024 3.72  1.70 - 7.00 10*3/mm3 Final    Lymphocytes, Absolute 08/15/2024 2.26  0.70 - 3.10 10*3/mm3 Final    Monocytes, Absolute 08/15/2024 0.45  0.10 - 0.90 10*3/mm3 Final    Eosinophils, Absolute 08/15/2024 0.12  0.00 - 0.40 10*3/mm3 Final    Basophils, Absolute 08/15/2024 0.02  0.00 - 0.20 10*3/mm3 Final    Immature Grans, Absolute 08/15/2024 0.02  0.00 - 0.05 10*3/mm3 Final    nRBC 08/15/2024 0.0  0.0 - 0.2 /100 WBC Final    Extra Tube 08/15/2024 hold for add-on   Final    Auto resulted        Procedure:      Urethral dilation-after an appropriate informed consent, the patient was brought to the procedure suite.  The urethra was gently anesthetized with 10 mL of 2% viscous Xylocaine jelly.  After an adequate period of topical anesthesia I went ahead and  the urethra was gently anesthetized and dilated with [place] sounds from 16 to 30 Tel[place] sequentially without complication. The patient was given gentamicin as prophylaxis with 80 mg.  Assessment/Plan:   Posttraumatic urethral stricture  Narcotic pain medication-patient has significant acute pain that I believe would be an indication for the use of narcotic pain medication.  I discussed the significant risks of pain medication and the fact that this will be a short only option and I will give her no more than a three-day supply of pain medication, I will not plan long-term medication, and that this will be sent to a pain clinic if it at all becomes necessary.  We discussed signing a pain medication agreement and the fact that we're going to run a state [place]ERT[place] review to be sure the patient is not getting pain medication from elsewhere.  If this is the case, we will not give pain medication as part of the patient's treatment plan of there being prescribed a controlled substance with potential for abuse.  This patient has been well aware of the appropriate dose of such medications including the risks for somnolence, limited ability to drive and/or safety and the significant potential for overdose.  It has been made clear that these medications are for the prescribed patient only without concomitant use of alcohol or other substance unless prescribed by the medical provider.  Has completed prescribing agreement detailing the terms of continue prescribing him a controlled substance including monitoring Luis Alberto reports, the possibility of urine drug screens, and pill counts.  The patient is aware that we review LUIS ALBRETO reports on a regular basis and scan them into the  chart.  History and physical examination exhibited continued safe and appropriate use of controlled substances. We also discussed the fact that the new [place] legislation allows only a three-day prescription for pain medication.  In this situation he will be referred to a chronic pain clinic.          This document has been electronically signed by VERENICE FINK MD December 3, 2024 08:33 EST    Dictated Utilizing Dragon Dictation: Part of this note may be an electronic transcription/translation of spoken language to printed text using the Dragon Dictation System.

## 2024-12-12 DIAGNOSIS — N39.0 URINARY TRACT INFECTION WITHOUT HEMATURIA, SITE UNSPECIFIED: Primary | ICD-10-CM

## 2024-12-12 RX ORDER — NITROFURANTOIN 25; 75 MG/1; MG/1
100 CAPSULE ORAL 2 TIMES DAILY
Qty: 14 CAPSULE | Refills: 0 | Status: SHIPPED | OUTPATIENT
Start: 2024-12-12 | End: 2024-12-19

## 2024-12-12 RX ORDER — PHENAZOPYRIDINE HYDROCHLORIDE 200 MG/1
200 TABLET, FILM COATED ORAL 3 TIMES DAILY PRN
Qty: 20 TABLET | Refills: 0 | Status: SHIPPED | OUTPATIENT
Start: 2024-12-12

## 2024-12-12 RX ORDER — FLUCONAZOLE 100 MG/1
100 TABLET ORAL DAILY
Qty: 3 TABLET | Refills: 0 | Status: SHIPPED | OUTPATIENT
Start: 2024-12-12 | End: 2024-12-15

## 2025-01-02 ENCOUNTER — OFFICE VISIT (OUTPATIENT)
Dept: UROLOGY | Facility: CLINIC | Age: 50
End: 2025-01-02
Payer: COMMERCIAL

## 2025-01-02 VITALS
SYSTOLIC BLOOD PRESSURE: 150 MMHG | HEIGHT: 68 IN | WEIGHT: 108 LBS | BODY MASS INDEX: 16.37 KG/M2 | DIASTOLIC BLOOD PRESSURE: 102 MMHG

## 2025-01-02 DIAGNOSIS — N23 RENAL COLIC: ICD-10-CM

## 2025-01-02 DIAGNOSIS — N39.0 URINARY TRACT INFECTION WITHOUT HEMATURIA, SITE UNSPECIFIED: Primary | ICD-10-CM

## 2025-01-02 DIAGNOSIS — N35.028 OTHER POST-TRAUMATIC URETHRAL STRICTURE, FEMALE: ICD-10-CM

## 2025-01-02 RX ORDER — GENTAMICIN 40 MG/ML
80 INJECTION, SOLUTION INTRAMUSCULAR; INTRAVENOUS ONCE
Status: COMPLETED | OUTPATIENT
Start: 2025-01-02 | End: 2025-01-02

## 2025-01-02 RX ORDER — OXYCODONE AND ACETAMINOPHEN 10; 325 MG/1; MG/1
1 TABLET ORAL EVERY 6 HOURS PRN
Qty: 20 TABLET | Refills: 0 | Status: SHIPPED | OUTPATIENT
Start: 2025-01-02

## 2025-01-02 RX ORDER — PROMETHAZINE HYDROCHLORIDE 25 MG/1
25 TABLET ORAL EVERY 6 HOURS PRN
Qty: 28 TABLET | Refills: 4 | Status: SHIPPED | OUTPATIENT
Start: 2025-01-02

## 2025-01-02 RX ADMIN — GENTAMICIN 80 MG: 40 INJECTION, SOLUTION INTRAMUSCULAR; INTRAVENOUS at 09:22

## 2025-01-02 NOTE — PROGRESS NOTES
Chief Complaint:      Chief Complaint   Patient presents with    Posttraumatic urethral stricture     Follow up        HPI:   49 y.o. female here for urethral dilation secondary to a posttraumatic urethral stricture.    Past Medical History:     Past Medical History:   Diagnosis Date    Hoyos's disease     Bipolar 1 disorder     Constipation     DDD (degenerative disc disease), cervical 05/29/2017    Fibromyalgia     IBS (irritable bowel syndrome)     Meningioma     Migraine     PONV (postoperative nausea and vomiting)     PTSD (post-traumatic stress disorder)     Rectal bleeding        Current Meds:     Current Outpatient Medications   Medication Sig Dispense Refill    albuterol (PROVENTIL HFA;VENTOLIN HFA) 108 (90 Base) MCG/ACT inhaler Inhale 2 puffs 2 (Two) Times a Day.      Azelastine HCl 137 MCG/SPRAY solution Administer 1 spray into the nostril(s) as directed by provider As Needed (Allergies).      busPIRone (BUSPAR) 15 MG tablet Take 1 tablet by mouth 3 (Three) Times a Day.      diclofenac (VOLTAREN) 1 % gel gel       divalproex (DEPAKOTE) 500 MG DR tablet Take 1 tablet by mouth 3 (Three) Times a Day. 90 tablet 2    docusate sodium (COLACE) 100 MG capsule Take 1 capsule by mouth 2 (Two) Times a Day. 20 capsule 0    fluticasone (VERAMYST) 27.5 MCG/SPRAY nasal spray Administer 2 sprays into the nostril(s) as directed by provider Daily.      magnesium 30 MG tablet Take 1 tablet by mouth Daily. 10 tablet 0    megestrol (MEGACE) 20 MG tablet Take 1 tablet by mouth Daily. 30 tablet 3    methylPREDNISolone (MEDROL) 4 MG dose pack Take as directed on package instructions. 21 tablet 0    metroNIDAZOLE (FLAGYL) 500 MG tablet Take 1 tablet by mouth 3 (Three) Times a Day. 30 tablet 0    montelukast (SINGULAIR) 10 MG tablet Take 1 tablet by mouth Every Night.      naloxone (NARCAN) 4 MG/0.1ML nasal spray Call 911. Don't prime. Points in 1 nostril for overdose. Repeat in 2-3 minutes in other nostril if no or minimal  breathing/responsiveness. 2 each 0    nitrofurantoin, macrocrystal-monohydrate, (MACROBID) 100 MG capsule Take 1 capsule by mouth 2 (Two) Times a Day. 13 capsule 0    ondansetron ODT (ZOFRAN-ODT) 4 MG disintegrating tablet Place 2 tablets on the tongue Every 8 (Eight) Hours As Needed for Nausea or Vomiting. 30 tablet 0    oxyCODONE-acetaminophen (Percocet)  MG per tablet Take 1 tablet by mouth Every 6 (Six) Hours As Needed for Moderate Pain. 20 tablet 0    pantoprazole (PROTONIX) 40 MG EC tablet Take 1 tablet by mouth 2 (Two) Times a Day As Needed.      phenazopyridine (PYRIDIUM) 100 MG tablet Take 1 tablet by mouth 3 (Three) Times a Day As Needed for Bladder Spasms. 30 tablet 1    phenazopyridine (Pyridium) 200 MG tablet Take 1 tablet by mouth 3 (Three) Times a Day As Needed for Bladder Spasms. 20 tablet 0    polyethylene glycol (MIRALAX) 17 GM/SCOOP powder Take 17 g by mouth Daily. 225 g 0    potassium chloride (KLOR-CON M20) 20 MEQ CR tablet Take 1 tablet by mouth Daily. 10 tablet 0    promethazine (PHENERGAN) 25 MG tablet Take 1 tablet by mouth Every 6 (Six) Hours As Needed for Nausea or Vomiting. 28 tablet 4    promethazine (PHENERGAN) 25 MG tablet Take 1 tablet by mouth Every 6 (Six) Hours As Needed for Nausea or Vomiting. 21 tablet 5    sertraline (ZOLOFT) 100 MG tablet Take 1 tablet by mouth 2 (Two) Times a Day.      SUMAtriptan (IMITREX) 6 MG/0.5ML injection Inject prescribed dose at onset of headache. May repeat dose one time in 1 hour(s) if headache not relieved.      terazosin (HYTRIN) 2 MG capsule Take 1 capsule by mouth Every Night for 14 days. 14 capsule 0    traMADol (ULTRAM) 50 MG tablet Take 1 tablet by mouth Every 8 (Eight) Hours As Needed for Moderate Pain. 12 tablet 0     No current facility-administered medications for this visit.        Allergies:      Allergies   Allergen Reactions    Ativan [Lorazepam] Hallucinations     confusion    Sulfa Antibiotics Shortness Of Breath and Swelling     Sulfa Antibiotics Anaphylaxis    Reglan [Metoclopramide] Angioedema    Compazine [Prochlorperazine Edisylate] Hives    Demerol [Meperidine] Hives    Droperidol Itching    Metoclopramide Swelling    Toradol [Ketorolac Tromethamine] Hives and Itching    Toradol [Ketorolac Tromethamine] Hives    Zosyn [Piperacillin Sod-Tazobactam So] Hives        Past Surgical History:     Past Surgical History:   Procedure Laterality Date    ANAL SCOPE N/A 2016    Procedure: ANAL SCOPE;  Surgeon: Kael Lopez MD;  Location: [facility];  Service:     APPENDECTOMY      COLONOSCOPY N/A 2016    Procedure: COLONOSCOPY  CPTCODE:19905;  Surgeon: Jose Antonio Belle III, MD;  Location: [facility];  Service:     COLONOSCOPY N/A 2016    Procedure: COLONOSCOPY (25954) CPT;  Surgeon: Jose Antonio Belle III, MD;  Location: [facility];  Service:     CYSTOSCOPY RETROGRADE PYELOGRAM Bilateral 2017    Procedure: CYSTOSCOPY RETROGRADE PYELOGRAM;  Surgeon: Mu Blunt MD;  Location: [facility];  Service:     ENDOSCOPY N/A 2016    Procedure: ESOPHAGOGASTRODUODENOSCOPY WITH BIOPSY  CPTCODE:78323;  Surgeon: Jose Antonio Belle III, MD;  Location: [facility];  Service:     HEMORRHOIDECTOMY N/A 2016    Procedure: HEMORRHOID STAPLING;  Surgeon: Kael Lopez MD;  Location: [facility];  Service:     HYSTERECTOMY      KNEE SURGERY      LAPAROSCOPIC SALPINGOOPHERECTOMY      PORTACATH PLACEMENT N/A 2017    Procedure: INSERTION OF PORTACATH;  Surgeon: Celso Arredondo MD;  Location: [facility];  Service:     SHOULDER SURGERY      3 times       Social History:     Social History     Socioeconomic History    Marital status:    Tobacco Use    Smoking status: Former     Current packs/day: 0.00     Average packs/day: 1 pack/day for 20.0 years (20.0 ttl pk-yrs)     Types: Cigarettes     Start date: 1995     Quit date: 2015     Years since quittin.1     Passive exposure: Past    Smokeless tobacco:  Never   Vaping Use    Vaping status: Never Used   Substance and Sexual Activity    Alcohol use: No    Drug use: Yes     Types: Marijuana     Comment: for pain    Sexual activity: Defer     Birth control/protection: Surgical       Family History:     Family History   Problem Relation Age of Onset    Crohn's disease Other     Hypertension Other     Diabetes Other     Irritable bowel syndrome Other     No Known Problems Father     No Known Problems Mother        Review of Systems:     Review of Systems   Constitutional: Negative.  Negative for activity change, appetite change, chills, diaphoresis, fatigue and unexpected weight change.   HENT:  Negative for congestion, dental problem, drooling, ear discharge, ear pain, facial swelling, hearing loss, mouth sores, nosebleeds, postnasal drip, rhinorrhea, sinus pressure, sneezing, sore throat, tinnitus, trouble swallowing and voice change.    Eyes: Negative.  Negative for photophobia, pain, discharge, redness, itching and visual disturbance.   Respiratory: Negative.  Negative for apnea, cough, choking, chest tightness, shortness of breath, wheezing and stridor.    Cardiovascular: Negative.  Negative for chest pain, palpitations and leg swelling.   Gastrointestinal: Negative.  Negative for abdominal distention, abdominal pain, anal bleeding, blood in stool, constipation, diarrhea, nausea, rectal pain and vomiting.   Endocrine: Negative.  Negative for cold intolerance, heat intolerance, polydipsia, polyphagia and polyuria.   Genitourinary:  Positive for difficulty urinating.   Musculoskeletal: Negative.  Negative for arthralgias, back pain, gait problem, joint swelling, myalgias, neck pain and neck stiffness.   Skin: Negative.  Negative for color change, pallor, rash and wound.   Allergic/Immunologic: Negative.  Negative for environmental allergies, food allergies and immunocompromised state.   Neurological: Negative.  Negative for dizziness, tremors, seizures, syncope,  facial asymmetry, speech difficulty, weakness, light-headedness, numbness and headaches.   Hematological: Negative.  Negative for adenopathy. Does not bruise/bleed easily.   Psychiatric/Behavioral:  Negative for agitation, behavioral problems, confusion, decreased concentration, dysphoric mood, hallucinations, self-injury, sleep disturbance and suicidal ideas. The patient is not nervous/anxious and is not hyperactive.    All other systems reviewed and are negative.      Physical Exam:     Physical Exam  Constitutional:       Appearance: She is well-developed.   HENT:      Head: Normocephalic and atraumatic.      Right Ear: External ear normal.      Left Ear: External ear normal.   Eyes:      Conjunctiva/sclera: Conjunctivae normal.      Pupils: Pupils are equal, round, and reactive to light.   Cardiovascular:      Rate and Rhythm: Normal rate and regular rhythm.      Heart sounds: Normal heart sounds.   Pulmonary:      Effort: Pulmonary effort is normal.      Breath sounds: Normal breath sounds.   Abdominal:      General: Bowel sounds are normal. There is no distension.      Palpations: Abdomen is soft. There is no mass.      Tenderness: There is no abdominal tenderness. There is no guarding or rebound.   Genitourinary:     General: Normal vulva.      Vagina: No vaginal discharge.   Musculoskeletal:         General: Normal range of motion.   Skin:     General: Skin is warm and dry.   Neurological:      Mental Status: She is alert.      Deep Tendon Reflexes: Reflexes are normal and symmetric.   Psychiatric:         Behavior: Behavior normal.         Thought Content: Thought content normal.         Judgment: Judgment normal.         I have reviewed the following portions of the patient's history: Allergies, current medications, past family history, past medical history, past social history, past surgical history, problem list, and ROS and confirm it is accurate.    Recent Image (CT and/or KUB):      CT Abdomen and  Pelvis: No results found for this or any previous visit.       CT Stone Protocol: Results for orders placed during the hospital encounter of 10/10/23    CT Abdomen Pelvis Stone Protocol    Narrative  CT ABDOMEN PELVIS STONE PROTOCOL-: 10/10/2023 9:19 PM    REASON FOR EXAM: Flank pain, kidney stone suspected LT FLANK PAIN.    TECHNIQUE: Noncontrast 4 mm axial sections with sagittal and coronal  reformats.    COMPARISON: 3/16/2023.    FINDINGS: There is focal bronchiectasis within the right lower lobe  (image 4/59), now with probable mucous plugging.  Noting the noncontrast  technique, the liver is unremarkable.  No focal liver lesions are  identified.  Best demonstrated on narrow windows, there is the  suggestion of cholelithiasis dependently within the gallbladder.  There  is no biliary ductal dilatation.  The spleen is at the upper limits of  normal in size.  The pancreas, adrenal glands and kidneys are within  normal limits, noting a probable cyst within the lower pole of the right  kidney (image 3/37).  There is no nephrolithiasis.  There is no abnormal  dilatation of either renal collecting system or ureter.  There is no  ureterolithiasis.  The abdominal aorta is normal in caliber, noting mild  vascular calcification.  There is a surgical anastomosis at the  rectoanal junction.  No complication is evident.  There are no findings  of bowel obstruction.  Noting the lack of enteric contrast material,  there is no abnormal bowel wall thickening.  The appendix is not  identified, however there are no findings of appendicitis.  There is no  free intraperitoneal fluid.  The uterus is absent.  The ovaries are not  identified.  There are no abnormal adnexal masses.  There is no lymph  node enlargement.  The bladder is unremarkable.  There is an old  fracture of L2.    Impression  1.  No acute abnormality of the abdomen or pelvis.  There is no  nephrolithiasis or ureterolithiasis.  2.  Incidental findings, as above,  similar in appearance to 3/16/2023.      This report was finalized on 10/10/2023 11:05 PM by Steph Fabian MD.       KUB: Results for orders placed during the hospital encounter of 01/10/23    XR Abdomen KUB    Narrative  EXAM:  XR Abdomen, 1 View    EXAM DATE:  1/10/2023 12:05 PM    CLINICAL HISTORY:  flank pain    TECHNIQUE:  Frontal supine view of the abdomen/pelvis.    COMPARISON:  10/01/2022    FINDINGS:  Gastrointestinal tract:  Unremarkable.  No dilation.  Organs:  No stones identified along the expected course of ureters.  Bones/joints:  Unremarkable.  Soft tissues:  Surgical staples in the lower pelvis.  Vasculature:  Stable phleboliths lower pelvis.    Impression  1.  No acute findings radiographically evident.  2.  No radiographic evidence of renal or ureteral stones.    This report was finalized on 1/10/2023 12:47 PM by Dr. Ankit Naik MD.       Labs (past 3 months):      No visits with results within 3 Month(s) from this visit.   Latest known visit with results is:   Admission on 08/15/2024, Discharged on 08/16/2024   Component Date Value Ref Range Status    Glucose 08/15/2024 80  65 - 99 mg/dL Final    BUN 08/15/2024 6  6 - 20 mg/dL Final    Creatinine 08/15/2024 0.56 (L)  0.57 - 1.00 mg/dL Final    Sodium 08/15/2024 142  136 - 145 mmol/L Final    Potassium 08/15/2024 4.1  3.5 - 5.2 mmol/L Final    Slight hemolysis detected by analyzer. Result may be falsely elevated.    Chloride 08/15/2024 107  98 - 107 mmol/L Final    CO2 08/15/2024 25.2  22.0 - 29.0 mmol/L Final    Calcium 08/15/2024 9.1  8.6 - 10.5 mg/dL Final    Total Protein 08/15/2024 6.5  6.0 - 8.5 g/dL Final    Albumin 08/15/2024 4.1  3.5 - 5.2 g/dL Final    ALT (SGPT) 08/15/2024 58 (H)  1 - 33 U/L Final    AST (SGOT) 08/15/2024 47 (H)  1 - 32 U/L Final    Alkaline Phosphatase 08/15/2024 105  39 - 117 U/L Final    Total Bilirubin 08/15/2024 0.5  0.0 - 1.2 mg/dL Final    Globulin 08/15/2024 2.4  gm/dL Final    A/G Ratio 08/15/2024 1.7   g/dL Final    BUN/Creatinine Ratio 08/15/2024 10.7  7.0 - 25.0 Final    Anion Gap 08/15/2024 9.8  5.0 - 15.0 mmol/L Final    eGFR 08/15/2024 112.0  >60.0 mL/min/1.73 Final    Lipase 08/15/2024 24  13 - 60 U/L Final    Extra Tube 08/15/2024 Hold for add-ons.   Final    Auto resulted.    Extra Tube 08/15/2024 hold for add-on   Final    Auto resulted    Extra Tube 08/15/2024 Hold for add-ons.   Final    Auto resulted.    Extra Tube 08/15/2024 Hold for add-ons.   Final    Auto resulted    WBC 08/15/2024 6.59  3.40 - 10.80 10*3/mm3 Final    RBC 08/15/2024 5.17  3.77 - 5.28 10*6/mm3 Final    Hemoglobin 08/15/2024 16.2 (H)  12.0 - 15.9 g/dL Final    Hematocrit 08/15/2024 48.3 (H)  34.0 - 46.6 % Final    MCV 08/15/2024 93.4  79.0 - 97.0 fL Final    MCH 08/15/2024 31.3  26.6 - 33.0 pg Final    MCHC 08/15/2024 33.5  31.5 - 35.7 g/dL Final    RDW 08/15/2024 12.9  12.3 - 15.4 % Final    RDW-SD 08/15/2024 44.2  37.0 - 54.0 fl Final    MPV 08/15/2024 10.2  6.0 - 12.0 fL Final    Platelets 08/15/2024 159  140 - 450 10*3/mm3 Final    Neutrophil % 08/15/2024 56.5  42.7 - 76.0 % Final    Lymphocyte % 08/15/2024 34.3  19.6 - 45.3 % Final    Monocyte % 08/15/2024 6.8  5.0 - 12.0 % Final    Eosinophil % 08/15/2024 1.8  0.3 - 6.2 % Final    Basophil % 08/15/2024 0.3  0.0 - 1.5 % Final    Immature Grans % 08/15/2024 0.3  0.0 - 0.5 % Final    Neutrophils, Absolute 08/15/2024 3.72  1.70 - 7.00 10*3/mm3 Final    Lymphocytes, Absolute 08/15/2024 2.26  0.70 - 3.10 10*3/mm3 Final    Monocytes, Absolute 08/15/2024 0.45  0.10 - 0.90 10*3/mm3 Final    Eosinophils, Absolute 08/15/2024 0.12  0.00 - 0.40 10*3/mm3 Final    Basophils, Absolute 08/15/2024 0.02  0.00 - 0.20 10*3/mm3 Final    Immature Grans, Absolute 08/15/2024 0.02  0.00 - 0.05 10*3/mm3 Final    nRBC 08/15/2024 0.0  0.0 - 0.2 /100 WBC Final    Extra Tube 08/15/2024 hold for add-on   Final    Auto resulted        Procedure:   Urethral dilation-after an appropriate informed consent, the  patient was brought to the procedure suite.  The urethra was gently anesthetized with 10 mL of 2% viscous Xylocaine jelly.  After an adequate period of topical anesthesia I went ahead and the urethra was gently anesthetized and dilated with Reese sounds from 16 to 26 Norwegian sequentially without complication. The patient was given gentamicin as prophylaxis with 80 mg.    Assessment/Plan:   Posttraumatic urethral stricture status post appropriate dilation.  Narcotic pain medication-patient has significant acute pain that I believe would be an indication for the use of narcotic pain medication.  I discussed the significant risks of pain medication and the fact that this will be a short only option and I will give her no more than a three-day supply of pain medication, I will not plan long-term medication, and that this will be sent to a pain clinic if it at all becomes necessary.  We discussed signing a pain medication agreement and the fact that we're going to run a state [place] review to be sure the patient is not getting pain medication from elsewhere.  If this is the case, we will not give pain medication as part of the patient's treatment plan of there being prescribed a controlled substance with potential for abuse.  This patient has been well aware of the appropriate dose of such medications including the risks for somnolence, limited ability to drive and/or safety and the significant potential for overdose.  It has been made clear that these medications are for the prescribed patient only without concomitant use of alcohol or other substance unless prescribed by the medical provider.  Has completed prescribing agreement detailing the terms of continue prescribing him a controlled substance including monitoring [place] Alberto reports, the possibility of urine drug screens, and pill counts.  The patient is aware that we review [place]BERTO reports on a regular basis and scan them into the chart.  History and physical  examination exhibited continued safe and appropriate use of controlled substances. We also discussed the fact that the new [place] legislation allows only a three-day prescription for pain medication.  In this situation he will be referred to a chronic pain clinic.              This document has been electronically signed by VERENICE FINK MD January 2, 2025 09:19 EST    Dictated Utilizing Dragon Dictation: Part of this note may be an electronic transcription/translation of spoken language to printed text using the Dragon Dictation System.

## 2025-01-06 ENCOUNTER — HOSPITAL ENCOUNTER (EMERGENCY)
Facility: HOSPITAL | Age: 50
Discharge: HOME | End: 2025-01-06
Attending: EMERGENCY MEDICINE | Admitting: EMERGENCY MEDICINE
Payer: COMMERCIAL

## 2025-01-06 VITALS
TEMPERATURE: 97.9 F | SYSTOLIC BLOOD PRESSURE: 131 MMHG | HEIGHT: 68 IN | BODY MASS INDEX: 16.97 KG/M2 | HEART RATE: 84 BPM | RESPIRATION RATE: 20 BRPM | OXYGEN SATURATION: 99 % | DIASTOLIC BLOOD PRESSURE: 99 MMHG | WEIGHT: 112 LBS

## 2025-01-06 DIAGNOSIS — G43.909 MIGRAINE WITHOUT STATUS MIGRAINOSUS, NOT INTRACTABLE, UNSPECIFIED MIGRAINE TYPE: Primary | ICD-10-CM

## 2025-01-06 PROCEDURE — 99282 EMERGENCY DEPT VISIT SF MDM: CPT

## 2025-01-06 PROCEDURE — 25010000002 DEXAMETHASONE SODIUM PHOSPHATE 10 MG/ML SOLUTION: Performed by: EMERGENCY MEDICINE

## 2025-01-06 PROCEDURE — 25010000002 DIPHENHYDRAMINE PER 50 MG: Performed by: EMERGENCY MEDICINE

## 2025-01-06 PROCEDURE — 25010000002 HYDROMORPHONE PER 4 MG: Performed by: EMERGENCY MEDICINE

## 2025-01-06 PROCEDURE — 96372 THER/PROPH/DIAG INJ SC/IM: CPT

## 2025-01-06 RX ORDER — HYDROMORPHONE HYDROCHLORIDE 1 MG/ML
0.5 INJECTION, SOLUTION INTRAMUSCULAR; INTRAVENOUS; SUBCUTANEOUS ONCE
Status: COMPLETED | OUTPATIENT
Start: 2025-01-06 | End: 2025-01-06

## 2025-01-06 RX ORDER — DEXAMETHASONE SODIUM PHOSPHATE 10 MG/ML
10 INJECTION, SOLUTION INTRAMUSCULAR; INTRAVENOUS ONCE
Status: COMPLETED | OUTPATIENT
Start: 2025-01-06 | End: 2025-01-06

## 2025-01-06 RX ORDER — DIPHENHYDRAMINE HYDROCHLORIDE 50 MG/ML
25 INJECTION INTRAMUSCULAR; INTRAVENOUS ONCE
Status: COMPLETED | OUTPATIENT
Start: 2025-01-06 | End: 2025-01-06

## 2025-01-06 RX ADMIN — DIPHENHYDRAMINE HYDROCHLORIDE 25 MG: 50 INJECTION, SOLUTION INTRAMUSCULAR; INTRAVENOUS at 15:03

## 2025-01-06 RX ADMIN — HYDROMORPHONE HYDROCHLORIDE 0.5 MG: 1 INJECTION, SOLUTION INTRAMUSCULAR; INTRAVENOUS; SUBCUTANEOUS at 15:02

## 2025-01-06 RX ADMIN — DEXAMETHASONE SODIUM PHOSPHATE 10 MG: 10 INJECTION INTRAMUSCULAR; INTRAVENOUS at 15:04

## 2025-01-06 NOTE — ED PROVIDER NOTES
Subjective   History of Present Illness  49-year-old female with past medical history of PTSD, migraines, meningioma, IBS, fibromyalgia, degenerative disc disease, Buerger's disease, and bipolar disorder presents to the emergency room with a headache.  Patient states she has a history of migraines and this seems consistent.  She denies any fevers, chills, body aches, or neck pain.  States this headache has been present x 4 days without any relief.  Denies any other complaints or concerns at this time.    History provided by:  Patient   used: No        Review of Systems   Constitutional: Negative.  Negative for fever.   Respiratory: Negative.     Cardiovascular: Negative.  Negative for chest pain.   Gastrointestinal: Negative.  Negative for abdominal pain.   Endocrine: Negative.    Genitourinary: Negative.  Negative for dysuria.   Skin: Negative.    Neurological:  Positive for headaches.   Psychiatric/Behavioral: Negative.     All other systems reviewed and are negative.      Past Medical History:   Diagnosis Date    Hoyos's disease     Bipolar 1 disorder     Constipation     DDD (degenerative disc disease), cervical 05/29/2017    Fibromyalgia     IBS (irritable bowel syndrome)     Meningioma     Migraine     PONV (postoperative nausea and vomiting)     PTSD (post-traumatic stress disorder)     Rectal bleeding        Allergies   Allergen Reactions    Ativan [Lorazepam] Hallucinations     confusion    Sulfa Antibiotics Shortness Of Breath and Swelling    Sulfa Antibiotics Anaphylaxis    Reglan [Metoclopramide] Angioedema    Compazine [Prochlorperazine Edisylate] Hives    Demerol [Meperidine] Hives    Droperidol Itching    Metoclopramide Swelling    Toradol [Ketorolac Tromethamine] Hives and Itching    Toradol [Ketorolac Tromethamine] Hives    Zosyn [Piperacillin Sod-Tazobactam So] Hives       Past Surgical History:   Procedure Laterality Date    ANAL SCOPE N/A 7/28/2016    Procedure: ANAL SCOPE;   Surgeon: Kael Lopez MD;  Location: [facility] OR;  Service:     APPENDECTOMY      COLONOSCOPY N/A 2016    Procedure: COLONOSCOPY  CPTCODE:28648;  Surgeon: Jose Antonio Belle III, MD;  Location: [facility] OR;  Service:     COLONOSCOPY N/A 2016    Procedure: COLONOSCOPY (24441) CPT;  Surgeon: Jose Antonio Belle III, MD;  Location: [facility] OR;  Service:     CYSTOSCOPY RETROGRADE PYELOGRAM Bilateral 2017    Procedure: CYSTOSCOPY RETROGRADE PYELOGRAM;  Surgeon: Mu Blunt MD;  Location: [facility] OR;  Service:     ENDOSCOPY N/A 2016    Procedure: ESOPHAGOGASTRODUODENOSCOPY WITH BIOPSY  CPTCODE:77466;  Surgeon: Jose Antonio Belle III, MD;  Location: [facility] OR;  Service:     HEMORRHOIDECTOMY N/A 2016    Procedure: HEMORRHOID STAPLING;  Surgeon: Kael Lopez MD;  Location: [facility] OR;  Service:     HYSTERECTOMY      KNEE SURGERY      LAPAROSCOPIC SALPINGOOPHERECTOMY      PORTACATH PLACEMENT N/A 2017    Procedure: INSERTION OF PORTACATH;  Surgeon: Celso Arredondo MD;  Location: [facility] OR;  Service:     SHOULDER SURGERY      3 times       Family History   Problem Relation Age of Onset    Crohn's disease Other     Hypertension Other     Diabetes Other     Irritable bowel syndrome Other     No Known Problems Father     No Known Problems Mother        Social History     Socioeconomic History    Marital status:    Tobacco Use    Smoking status: Former     Current packs/day: 0.00     Average packs/day: 1 pack/day for 20.0 years (20.0 ttl pk-yrs)     Types: Cigarettes     Start date: 1995     Quit date: 2015     Years since quittin.1     Passive exposure: Past    Smokeless tobacco: Never   Vaping Use    Vaping status: Never Used   Substance and Sexual Activity    Alcohol use: No    Drug use: Yes     Types: Marijuana     Comment: for pain    Sexual activity: Defer     Birth control/protection: Surgical           Objective   Physical Exam  Vitals and nursing note  reviewed.   Constitutional:       General: She is not in acute distress.     Appearance: She is well-developed. She is not diaphoretic.   HENT:      Head: Normocephalic and atraumatic.      Right Ear: External ear normal.      Left Ear: External ear normal.      Nose: Nose normal.   Eyes:      Conjunctiva/sclera: Conjunctivae normal.      Pupils: Pupils are equal, round, and reactive to light.   Neck:      Vascular: No JVD.      Trachea: No tracheal deviation.   Cardiovascular:      Rate and Rhythm: Normal rate and regular rhythm.      Heart sounds: Normal heart sounds. No murmur heard.  Pulmonary:      Effort: Pulmonary effort is normal. No respiratory distress.      Breath sounds: Normal breath sounds. No wheezing.   Abdominal:      General: Bowel sounds are normal.      Palpations: Abdomen is soft.      Tenderness: There is no abdominal tenderness.   Musculoskeletal:         General: No deformity. Normal range of motion.      Cervical back: Normal range of motion and neck supple.   Skin:     General: Skin is warm and dry.      Coloration: Skin is not pale.      Findings: No erythema or rash.   Neurological:      Mental Status: She is alert and oriented to person, place, and time.      Cranial Nerves: No cranial nerve deficit.   Psychiatric:         Behavior: Behavior normal.         Thought Content: Thought content normal.         Procedures           ED Course                                                       Medical Decision Making  49-year-old female with past medical history of PTSD, migraines, meningioma, IBS, fibromyalgia, degenerative disc disease, Buerger's disease, and bipolar disorder presents to the emergency room with a headache.  Patient states she has a history of migraines and this seems consistent.  She denies any fevers, chills, body aches, or neck pain.  States this headache has been present x 4 days without any relief.  Denies any other complaints or concerns at this time.      Problems  Addressed:  Migraine without status migrainosus, not intractable, unspecified migraine type: complicated acute illness or injury    Risk  Prescription drug management.        Final diagnoses:   Migraine without status migrainosus, not intractable, unspecified migraine type       ED Disposition  ED Disposition       ED Disposition   Discharge    Condition   Stable    Comment   --               Mabel Patterson, APRN  [address] [address]  [phone]    In 2 days           Medication List      No changes were made to your prescriptions during this visit.            Han Varghese PAKellieC  01/06/25 1545

## 2025-01-30 ENCOUNTER — TELEPHONE (OUTPATIENT)
Dept: UROLOGY | Facility: CLINIC | Age: 50
End: 2025-01-30
Payer: COMMERCIAL

## 2025-01-30 ENCOUNTER — OFFICE VISIT (OUTPATIENT)
Dept: UROLOGY | Facility: CLINIC | Age: 50
End: 2025-01-30
Payer: COMMERCIAL

## 2025-01-30 VITALS
WEIGHT: 109 LBS | BODY MASS INDEX: 16.52 KG/M2 | HEIGHT: 68 IN | DIASTOLIC BLOOD PRESSURE: 102 MMHG | SYSTOLIC BLOOD PRESSURE: 134 MMHG

## 2025-01-30 DIAGNOSIS — N23 RENAL COLIC: ICD-10-CM

## 2025-01-30 DIAGNOSIS — R31.0 GROSS HEMATURIA: ICD-10-CM

## 2025-01-30 DIAGNOSIS — N35.021 URETHRAL STRICTURE DUE TO CHILDBIRTH: ICD-10-CM

## 2025-01-30 DIAGNOSIS — R35.0 FREQUENCY OF MICTURITION: Primary | ICD-10-CM

## 2025-01-30 DIAGNOSIS — N35.028 OTHER POST-TRAUMATIC URETHRAL STRICTURE, FEMALE: ICD-10-CM

## 2025-01-30 DIAGNOSIS — F31.9 BIPOLAR 1 DISORDER: ICD-10-CM

## 2025-01-30 PROCEDURE — 1159F MED LIST DOCD IN RCRD: CPT | Performed by: UROLOGY

## 2025-01-30 PROCEDURE — 1160F RVW MEDS BY RX/DR IN RCRD: CPT | Performed by: UROLOGY

## 2025-01-30 PROCEDURE — 53661 DILATION OF URETHRA: CPT | Performed by: UROLOGY

## 2025-01-30 PROCEDURE — 96372 THER/PROPH/DIAG INJ SC/IM: CPT | Performed by: UROLOGY

## 2025-01-30 RX ORDER — PROMETHAZINE HYDROCHLORIDE 25 MG/1
25 TABLET ORAL EVERY 6 HOURS PRN
Qty: 21 TABLET | Refills: 5 | Status: SHIPPED | OUTPATIENT
Start: 2025-01-30

## 2025-01-30 RX ORDER — OXYCODONE AND ACETAMINOPHEN 10; 325 MG/1; MG/1
1 TABLET ORAL EVERY 6 HOURS PRN
Qty: 20 TABLET | Refills: 0 | Status: SHIPPED | OUTPATIENT
Start: 2025-01-30

## 2025-01-30 RX ORDER — AZITHROMYCIN 250 MG/1
TABLET, FILM COATED ORAL
Qty: 6 TABLET | Refills: 0 | Status: SHIPPED | OUTPATIENT
Start: 2025-01-30 | End: 2025-02-04

## 2025-01-30 RX ORDER — MEGESTROL ACETATE 20 MG/1
20 TABLET ORAL DAILY
Qty: 30 TABLET | Refills: 3 | Status: SHIPPED | OUTPATIENT
Start: 2025-01-30

## 2025-01-30 RX ORDER — GENTAMICIN 40 MG/ML
80 INJECTION, SOLUTION INTRAMUSCULAR; INTRAVENOUS ONCE
Status: COMPLETED | OUTPATIENT
Start: 2025-01-30 | End: 2025-01-30

## 2025-01-30 RX ADMIN — GENTAMICIN 80 MG: 40 INJECTION, SOLUTION INTRAMUSCULAR; INTRAVENOUS at 09:43

## 2025-01-30 NOTE — PROGRESS NOTES
Chief Complaint:      Chief Complaint   Patient presents with    dilation     Follow up        HPI:   50 y.o. female here for urethral dilation    Past Medical History:     Past Medical History:   Diagnosis Date    Hoyos's disease     Bipolar 1 disorder     Constipation     DDD (degenerative disc disease), cervical 05/29/2017    Fibromyalgia     IBS (irritable bowel syndrome)     Meningioma     Migraine     PONV (postoperative nausea and vomiting)     PTSD (post-traumatic stress disorder)     Rectal bleeding        Current Meds:     Current Outpatient Medications   Medication Sig Dispense Refill    albuterol (PROVENTIL HFA;VENTOLIN HFA) 108 (90 Base) MCG/ACT inhaler Inhale 2 puffs 2 (Two) Times a Day.      Azelastine HCl 137 MCG/SPRAY solution Administer 1 spray into the nostril(s) as directed by provider As Needed (Allergies).      busPIRone (BUSPAR) 15 MG tablet Take 1 tablet by mouth 3 (Three) Times a Day.      diclofenac (VOLTAREN) 1 % gel gel       divalproex (DEPAKOTE) 500 MG DR tablet Take 1 tablet by mouth 3 (Three) Times a Day. 90 tablet 2    docusate sodium (COLACE) 100 MG capsule Take 1 capsule by mouth 2 (Two) Times a Day. 20 capsule 0    fluticasone (VERAMYST) 27.5 MCG/SPRAY nasal spray Administer 2 sprays into the nostril(s) as directed by provider Daily.      magnesium 30 MG tablet Take 1 tablet by mouth Daily. 10 tablet 0    megestrol (MEGACE) 20 MG tablet Take 1 tablet by mouth Daily. 30 tablet 3    methylPREDNISolone (MEDROL) 4 MG dose pack Take as directed on package instructions. 21 tablet 0    metroNIDAZOLE (FLAGYL) 500 MG tablet Take 1 tablet by mouth 3 (Three) Times a Day. 30 tablet 0    montelukast (SINGULAIR) 10 MG tablet Take 1 tablet by mouth Every Night.      naloxone (NARCAN) 4 MG/0.1ML nasal spray Call 911. Don't prime. Anderson in 1 nostril for overdose. Repeat in 2-3 minutes in other nostril if no or minimal breathing/responsiveness. 2 each 0    nitrofurantoin, macrocrystal-monohydrate,  (MACROBID) 100 MG capsule Take 1 capsule by mouth 2 (Two) Times a Day. 13 capsule 0    ondansetron ODT (ZOFRAN-ODT) 4 MG disintegrating tablet Place 2 tablets on the tongue Every 8 (Eight) Hours As Needed for Nausea or Vomiting. 30 tablet 0    oxyCODONE-acetaminophen (Percocet)  MG per tablet Take 1 tablet by mouth Every 6 (Six) Hours As Needed for Moderate Pain. 20 tablet 0    pantoprazole (PROTONIX) 40 MG EC tablet Take 1 tablet by mouth 2 (Two) Times a Day As Needed.      phenazopyridine (PYRIDIUM) 100 MG tablet Take 1 tablet by mouth 3 (Three) Times a Day As Needed for Bladder Spasms. 30 tablet 1    phenazopyridine (Pyridium) 200 MG tablet Take 1 tablet by mouth 3 (Three) Times a Day As Needed for Bladder Spasms. 20 tablet 0    polyethylene glycol (MIRALAX) 17 GM/SCOOP powder Take 17 g by mouth Daily. 225 g 0    potassium chloride (KLOR-CON M20) 20 MEQ CR tablet Take 1 tablet by mouth Daily. 10 tablet 0    promethazine (PHENERGAN) 25 MG tablet Take 1 tablet by mouth Every 6 (Six) Hours As Needed for Nausea or Vomiting. 21 tablet 5    promethazine (PHENERGAN) 25 MG tablet Take 1 tablet by mouth Every 6 (Six) Hours As Needed for Nausea or Vomiting. 28 tablet 4    sertraline (ZOLOFT) 100 MG tablet Take 1 tablet by mouth 2 (Two) Times a Day.      SUMAtriptan (IMITREX) 6 MG/0.5ML injection Inject prescribed dose at onset of headache. May repeat dose one time in 1 hour(s) if headache not relieved.      terazosin (HYTRIN) 2 MG capsule Take 1 capsule by mouth Every Night for 14 days. 14 capsule 0    traMADol (ULTRAM) 50 MG tablet Take 1 tablet by mouth Every 8 (Eight) Hours As Needed for Moderate Pain. 12 tablet 0     No current facility-administered medications for this visit.        Allergies:      Allergies   Allergen Reactions    Ativan [Lorazepam] Hallucinations     confusion    Sulfa Antibiotics Shortness Of Breath and Swelling    Sulfa Antibiotics Anaphylaxis    Reglan [Metoclopramide] Angioedema     Compazine [Prochlorperazine Edisylate] Hives    Demerol [Meperidine] Hives    Droperidol Itching    Metoclopramide Swelling    Toradol [Ketorolac Tromethamine] Hives and Itching    Toradol [Ketorolac Tromethamine] Hives    Zosyn [Piperacillin Sod-Tazobactam So] Hives        Past Surgical History:     Past Surgical History:   Procedure Laterality Date    ANAL SCOPE N/A 2016    Procedure: ANAL SCOPE;  Surgeon: Kael Lopez MD;  Location: [facility] OR;  Service:     APPENDECTOMY      COLONOSCOPY N/A 2016    Procedure: COLONOSCOPY  CPTCODE:65036;  Surgeon: Jose Antonio Belle III, MD;  Location: [facility] OR;  Service:     COLONOSCOPY N/A 2016    Procedure: COLONOSCOPY (80377) CPT;  Surgeon: Jose Antonio Belle III, MD;  Location: [facility] OR;  Service:     CYSTOSCOPY RETROGRADE PYELOGRAM Bilateral 2017    Procedure: CYSTOSCOPY RETROGRADE PYELOGRAM;  Surgeon: Mu Blunt MD;  Location: [facility] OR;  Service:     ENDOSCOPY N/A 2016    Procedure: ESOPHAGOGASTRODUODENOSCOPY WITH BIOPSY  CPTCODE:75202;  Surgeon: Jose Antonio Belle III, MD;  Location: [facility] OR;  Service:     HEMORRHOIDECTOMY N/A 2016    Procedure: HEMORRHOID STAPLING;  Surgeon: Kael Lopez MD;  Location: [facility] OR;  Service:     HYSTERECTOMY      KNEE SURGERY      LAPAROSCOPIC SALPINGOOPHERECTOMY      PORTACATH PLACEMENT N/A 2017    Procedure: INSERTION OF PORTACATH;  Surgeon: Celso Arredondo MD;  Location: [facility] OR;  Service:     SHOULDER SURGERY      3 times       Social History:     Social History     Socioeconomic History    Marital status:    Tobacco Use    Smoking status: Former     Current packs/day: 0.00     Average packs/day: 1 pack/day for 20.0 years (20.0 ttl pk-yrs)     Types: Cigarettes     Start date: 1995     Quit date: 2015     Years since quittin.2     Passive exposure: Past    Smokeless tobacco: Never   Vaping Use    Vaping status: Never Used   Substance and Sexual  Activity    Alcohol use: No    Drug use: Yes     Types: Marijuana     Comment: for pain    Sexual activity: Defer     Birth control/protection: Surgical       Family History:     Family History   Problem Relation Age of Onset    Crohn's disease Other     Hypertension Other     Diabetes Other     Irritable bowel syndrome Other     No Known Problems Father     No Known Problems Mother        Review of Systems:     Review of Systems   Constitutional: Negative.  Negative for activity change, appetite change, chills, diaphoresis, fatigue and unexpected weight change.   HENT:  Negative for congestion, dental problem, drooling, ear discharge, ear pain, facial swelling, hearing loss, mouth sores, nosebleeds, postnasal drip, rhinorrhea, sinus pressure, sneezing, sore throat, tinnitus, trouble swallowing and voice change.    Eyes: Negative.  Negative for photophobia, pain, discharge, redness, itching and visual disturbance.   Respiratory: Negative.  Negative for apnea, cough, choking, chest tightness, shortness of breath, wheezing and stridor.    Cardiovascular: Negative.  Negative for chest pain, palpitations and leg swelling.   Gastrointestinal: Negative.  Negative for abdominal distention, abdominal pain, anal bleeding, blood in stool, constipation, diarrhea, nausea, rectal pain and vomiting.   Endocrine: Negative.  Negative for cold intolerance, heat intolerance, polydipsia, polyphagia and polyuria.   Genitourinary:  Positive for difficulty urinating and dyspareunia.   Musculoskeletal: Negative.  Negative for arthralgias, back pain, gait problem, joint swelling, myalgias, neck pain and neck stiffness.   Skin: Negative.  Negative for color change, pallor, rash and wound.   Allergic/Immunologic: Negative.  Negative for environmental allergies, food allergies and immunocompromised state.   Neurological: Negative.  Negative for dizziness, tremors, seizures, syncope, facial asymmetry, speech difficulty, weakness,  light-headedness, numbness and headaches.   Hematological: Negative.  Negative for adenopathy. Does not bruise/bleed easily.   Psychiatric/Behavioral:  Negative for agitation, behavioral problems, confusion, decreased concentration, dysphoric mood, hallucinations, self-injury, sleep disturbance and suicidal ideas. The patient is not nervous/anxious and is not hyperactive.    All other systems reviewed and are negative.      Physical Exam:     Physical Exam  Constitutional:       Appearance: She is well-developed.   HENT:      Head: Normocephalic and atraumatic.      Right Ear: External ear normal.      Left Ear: External ear normal.   Eyes:      Conjunctiva/sclera: Conjunctivae normal.      Pupils: Pupils are equal, round, and reactive to light.   Cardiovascular:      Rate and Rhythm: Normal rate and regular rhythm.      Heart sounds: Normal heart sounds.   Pulmonary:      Effort: Pulmonary effort is normal.      Breath sounds: Normal breath sounds.   Abdominal:      General: Bowel sounds are normal. There is no distension.      Palpations: Abdomen is soft. There is no mass.      Tenderness: There is no abdominal tenderness. There is no guarding or rebound.   Genitourinary:     General: Normal vulva.      Vagina: No vaginal discharge.   Musculoskeletal:         General: Normal range of motion.   Skin:     General: Skin is warm and dry.   Neurological:      Mental Status: She is alert.      Deep Tendon Reflexes: Reflexes are normal and symmetric.   Psychiatric:         Behavior: Behavior normal.         Thought Content: Thought content normal.         Judgment: Judgment normal.         I have reviewed the following portions of the patient's history: Allergies, current medications, past family history, past medical history, past social history, past surgical history, problem list, and ROS and confirm it is accurate.    Recent Image (CT and/or KUB):      CT Abdomen and Pelvis: No results found for this or any previous  visit.       CT Stone Protocol: Results for orders placed during the hospital encounter of 10/10/23    CT Abdomen Pelvis Stone Protocol    Narrative  CT ABDOMEN PELVIS STONE PROTOCOL-: 10/10/2023 9:19 PM    REASON FOR EXAM: Flank pain, kidney stone suspected LT FLANK PAIN.    TECHNIQUE: Noncontrast 4 mm axial sections with sagittal and coronal  reformats.    COMPARISON: 3/16/2023.    FINDINGS: There is focal bronchiectasis within the right lower lobe  (image 4/59), now with probable mucous plugging.  Noting the noncontrast  technique, the liver is unremarkable.  No focal liver lesions are  identified.  Best demonstrated on narrow windows, there is the  suggestion of cholelithiasis dependently within the gallbladder.  There  is no biliary ductal dilatation.  The spleen is at the upper limits of  normal in size.  The pancreas, adrenal glands and kidneys are within  normal limits, noting a probable cyst within the lower pole of the right  kidney (image 3/37).  There is no nephrolithiasis.  There is no abnormal  dilatation of either renal collecting system or ureter.  There is no  ureterolithiasis.  The abdominal aorta is normal in caliber, noting mild  vascular calcification.  There is a surgical anastomosis at the  rectoanal junction.  No complication is evident.  There are no findings  of bowel obstruction.  Noting the lack of enteric contrast material,  there is no abnormal bowel wall thickening.  The appendix is not  identified, however there are no findings of appendicitis.  There is no  free intraperitoneal fluid.  The uterus is absent.  The ovaries are not  identified.  There are no abnormal adnexal masses.  There is no lymph  node enlargement.  The bladder is unremarkable.  There is an old  fracture of L2.    Impression  1.  No acute abnormality of the abdomen or pelvis.  There is no  nephrolithiasis or ureterolithiasis.  2.  Incidental findings, as above, similar in appearance to 3/16/2023.      This report  was finalized on 10/10/2023 11:05 PM by Steph Fabian MD.       KUB: Results for orders placed during the hospital encounter of 01/10/23    XR Abdomen KUB    Narrative  EXAM:  XR Abdomen, 1 View    EXAM DATE:  1/10/2023 12:05 PM    CLINICAL HISTORY:  flank pain    TECHNIQUE:  Frontal supine view of the abdomen/pelvis.    COMPARISON:  10/01/2022    FINDINGS:  Gastrointestinal tract:  Unremarkable.  No dilation.  Organs:  No stones identified along the expected course of ureters.  Bones/joints:  Unremarkable.  Soft tissues:  Surgical staples in the lower pelvis.  Vasculature:  Stable phleboliths lower pelvis.    Impression  1.  No acute findings radiographically evident.  2.  No radiographic evidence of renal or ureteral stones.    This report was finalized on 1/10/2023 12:47 PM by Dr. Ankit Naik MD.       Labs (past 3 months):      No visits with results within 3 Month(s) from this visit.   Latest known visit with results is:   Admission on 08/15/2024, Discharged on 08/16/2024   Component Date Value Ref Range Status    Glucose 08/15/2024 80  65 - 99 mg/dL Final    BUN 08/15/2024 6  6 - 20 mg/dL Final    Creatinine 08/15/2024 0.56 (L)  0.57 - 1.00 mg/dL Final    Sodium 08/15/2024 142  136 - 145 mmol/L Final    Potassium 08/15/2024 4.1  3.5 - 5.2 mmol/L Final    Slight hemolysis detected by analyzer. Result may be falsely elevated.    Chloride 08/15/2024 107  98 - 107 mmol/L Final    CO2 08/15/2024 25.2  22.0 - 29.0 mmol/L Final    Calcium 08/15/2024 9.1  8.6 - 10.5 mg/dL Final    Total Protein 08/15/2024 6.5  6.0 - 8.5 g/dL Final    Albumin 08/15/2024 4.1  3.5 - 5.2 g/dL Final    ALT (SGPT) 08/15/2024 58 (H)  1 - 33 U/L Final    AST (SGOT) 08/15/2024 47 (H)  1 - 32 U/L Final    Alkaline Phosphatase 08/15/2024 105  39 - 117 U/L Final    Total Bilirubin 08/15/2024 0.5  0.0 - 1.2 mg/dL Final    Globulin 08/15/2024 2.4  gm/dL Final    A/G Ratio 08/15/2024 1.7  g/dL Final    BUN/Creatinine Ratio 08/15/2024 10.7   7.0 - 25.0 Final    Anion Gap 08/15/2024 9.8  5.0 - 15.0 mmol/L Final    eGFR 08/15/2024 112.0  >60.0 mL/min/1.73 Final    Lipase 08/15/2024 24  13 - 60 U/L Final    Extra Tube 08/15/2024 Hold for add-ons.   Final    Auto resulted.    Extra Tube 08/15/2024 hold for add-on   Final    Auto resulted    Extra Tube 08/15/2024 Hold for add-ons.   Final    Auto resulted.    Extra Tube 08/15/2024 Hold for add-ons.   Final    Auto resulted    WBC 08/15/2024 6.59  3.40 - 10.80 10*3/mm3 Final    RBC 08/15/2024 5.17  3.77 - 5.28 10*6/mm3 Final    Hemoglobin 08/15/2024 16.2 (H)  12.0 - 15.9 g/dL Final    Hematocrit 08/15/2024 48.3 (H)  34.0 - 46.6 % Final    MCV 08/15/2024 93.4  79.0 - 97.0 fL Final    MCH 08/15/2024 31.3  26.6 - 33.0 pg Final    MCHC 08/15/2024 33.5  31.5 - 35.7 g/dL Final    RDW 08/15/2024 12.9  12.3 - 15.4 % Final    RDW-SD 08/15/2024 44.2  37.0 - 54.0 fl Final    MPV 08/15/2024 10.2  6.0 - 12.0 fL Final    Platelets 08/15/2024 159  140 - 450 10*3/mm3 Final    Neutrophil % 08/15/2024 56.5  42.7 - 76.0 % Final    Lymphocyte % 08/15/2024 34.3  19.6 - 45.3 % Final    Monocyte % 08/15/2024 6.8  5.0 - 12.0 % Final    Eosinophil % 08/15/2024 1.8  0.3 - 6.2 % Final    Basophil % 08/15/2024 0.3  0.0 - 1.5 % Final    Immature Grans % 08/15/2024 0.3  0.0 - 0.5 % Final    Neutrophils, Absolute 08/15/2024 3.72  1.70 - 7.00 10*3/mm3 Final    Lymphocytes, Absolute 08/15/2024 2.26  0.70 - 3.10 10*3/mm3 Final    Monocytes, Absolute 08/15/2024 0.45  0.10 - 0.90 10*3/mm3 Final    Eosinophils, Absolute 08/15/2024 0.12  0.00 - 0.40 10*3/mm3 Final    Basophils, Absolute 08/15/2024 0.02  0.00 - 0.20 10*3/mm3 Final    Immature Grans, Absolute 08/15/2024 0.02  0.00 - 0.05 10*3/mm3 Final    nRBC 08/15/2024 0.0  0.0 - 0.2 /100 WBC Final    Extra Tube 08/15/2024 hold for add-on   Final    Auto resulted        Procedure:   Urethral dilation-after an appropriate informed consent, the patient was brought to the procedure suite.  The  urethra was gently anesthetized with 10 mL of 2% viscous Xylocaine jelly.  After an adequate period of topical anesthesia I went ahead and a dilated with [place] sounds from 16 to 26 [place] sequentially without complication. The patient was given gentamicin as prophylaxis with 80 mg.    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  I discussed the significant risks of pain medication and the fact that this will be a short only option and I will give her no more than a three-day supply of pain medication, I will not plan long-term medication, and that this will be sent to a pain clinic if it at all becomes necessary.  We discussed signing a pain medication agreement and the fact that we're going to run a state [place] review to be sure the patient is not getting pain medication from elsewhere.  If this is the case, we will not give pain medication as part of the patient's treatment plan of there being prescribed a controlled substance with potential for abuse.  This patient has been well aware of the appropriate dose of such medications including the risks for somnolence, limited ability to drive and/or safety and the significant potential for overdose.  It has been made clear that these medications are for the prescribed patient only without concomitant use of alcohol or other substance unless prescribed by the medical provider.  Has completed prescribing agreement detailing the terms of continue prescribing him a controlled substance including monitoring [place] reports, the possibility of urine drug screens, and pill counts.  The patient is aware that we review [place]ERTO reports on a regular basis and scan them into the chart.  History and physical examination exhibited continued safe and appropriate use of controlled substances. We also  discussed the fact that the new [place] legislation allows only a three-day prescription for pain medication.  In this situation he will be referred to a chronic pain clinic.  Nausea and vomiting-patient has significant nausea and vomiting as a consequence of this.  We recommended Phenergan.  We also discussed the use of Zofran and the sedating side effects of Phenergan as well.            This document has been electronically signed by VERENICE FINK MD January 30, 2025 09:39 EST    Dictated Utilizing Dragon Dictation: Part of this note may be an electronic transcription/translation of spoken language to printed text using the Dragon Dictation System.

## 2025-01-30 NOTE — TELEPHONE ENCOUNTER
I reached out to my catheter rep to check on her catheters she states she has not received any in 5-6 mths

## 2025-02-24 ENCOUNTER — OFFICE VISIT (OUTPATIENT)
Dept: UROLOGY | Facility: CLINIC | Age: 50
End: 2025-02-24
Payer: COMMERCIAL

## 2025-02-24 VITALS
WEIGHT: 108 LBS | HEIGHT: 68 IN | BODY MASS INDEX: 16.37 KG/M2 | SYSTOLIC BLOOD PRESSURE: 135 MMHG | DIASTOLIC BLOOD PRESSURE: 78 MMHG | HEART RATE: 90 BPM

## 2025-02-24 DIAGNOSIS — F31.9 BIPOLAR 1 DISORDER: ICD-10-CM

## 2025-02-24 DIAGNOSIS — R31.0 GROSS HEMATURIA: ICD-10-CM

## 2025-02-24 DIAGNOSIS — N35.028 OTHER POST-TRAUMATIC URETHRAL STRICTURE, FEMALE: ICD-10-CM

## 2025-02-24 DIAGNOSIS — N23 RENAL COLIC: ICD-10-CM

## 2025-02-24 DIAGNOSIS — Z48.816 AFTERCARE FOLLOWING SURGERY OF THE GENITOURINARY SYSTEM: Primary | ICD-10-CM

## 2025-02-24 RX ORDER — OXYCODONE AND ACETAMINOPHEN 10; 325 MG/1; MG/1
1 TABLET ORAL EVERY 6 HOURS PRN
Qty: 20 TABLET | Refills: 0 | Status: SHIPPED | OUTPATIENT
Start: 2025-02-24

## 2025-02-24 RX ORDER — GENTAMICIN 40 MG/ML
80 INJECTION, SOLUTION INTRAMUSCULAR; INTRAVENOUS ONCE
Status: COMPLETED | OUTPATIENT
Start: 2025-02-24 | End: 2025-02-24

## 2025-02-24 RX ORDER — PROMETHAZINE HYDROCHLORIDE 25 MG/1
25 TABLET ORAL EVERY 6 HOURS PRN
Qty: 28 TABLET | Refills: 4 | Status: SHIPPED | OUTPATIENT
Start: 2025-02-24

## 2025-02-24 RX ORDER — MEGESTROL ACETATE 20 MG/1
20 TABLET ORAL DAILY
Qty: 30 TABLET | Refills: 3 | Status: SHIPPED | OUTPATIENT
Start: 2025-02-24

## 2025-02-24 RX ADMIN — GENTAMICIN 80 MG: 40 INJECTION, SOLUTION INTRAMUSCULAR; INTRAVENOUS at 13:43

## 2025-02-24 NOTE — PROGRESS NOTES
Chief Complaint:      Chief Complaint   Patient presents with    Frequency of micturition       HPI:   50 y.o. female here for a dilation.    Past Medical History:     Past Medical History:   Diagnosis Date    Hoyos's disease     Bipolar 1 disorder     Constipation     DDD (degenerative disc disease), cervical 05/29/2017    Fibromyalgia     IBS (irritable bowel syndrome)     Meningioma     Migraine     PONV (postoperative nausea and vomiting)     PTSD (post-traumatic stress disorder)     Rectal bleeding        Current Meds:     Current Outpatient Medications   Medication Sig Dispense Refill    albuterol (PROVENTIL HFA;VENTOLIN HFA) 108 (90 Base) MCG/ACT inhaler Inhale 2 puffs 2 (Two) Times a Day.      Azelastine HCl 137 MCG/SPRAY solution Administer 1 spray into the nostril(s) as directed by provider As Needed (Allergies).      busPIRone (BUSPAR) 15 MG tablet Take 1 tablet by mouth 3 (Three) Times a Day.      diclofenac (VOLTAREN) 1 % gel gel       divalproex (DEPAKOTE) 500 MG DR tablet Take 1 tablet by mouth 3 (Three) Times a Day. 90 tablet 2    docusate sodium (COLACE) 100 MG capsule Take 1 capsule by mouth 2 (Two) Times a Day. 20 capsule 0    fluticasone (VERAMYST) 27.5 MCG/SPRAY nasal spray Administer 2 sprays into the nostril(s) as directed by provider Daily.      magnesium 30 MG tablet Take 1 tablet by mouth Daily. 10 tablet 0    megestrol (MEGACE) 20 MG tablet Take 1 tablet by mouth Daily. 30 tablet 3    methylPREDNISolone (MEDROL) 4 MG dose pack Take as directed on package instructions. 21 tablet 0    metroNIDAZOLE (FLAGYL) 500 MG tablet Take 1 tablet by mouth 3 (Three) Times a Day. 30 tablet 0    montelukast (SINGULAIR) 10 MG tablet Take 1 tablet by mouth Every Night.      naloxone (NARCAN) 4 MG/0.1ML nasal spray Call 911. Don't prime. Davis in 1 nostril for overdose. Repeat in 2-3 minutes in other nostril if no or minimal breathing/responsiveness. 2 each 0    nitrofurantoin, macrocrystal-monohydrate,  (MACROBID) 100 MG capsule Take 1 capsule by mouth 2 (Two) Times a Day. 13 capsule 0    ondansetron ODT (ZOFRAN-ODT) 4 MG disintegrating tablet Place 2 tablets on the tongue Every 8 (Eight) Hours As Needed for Nausea or Vomiting. 30 tablet 0    oxyCODONE-acetaminophen (Percocet)  MG per tablet Take 1 tablet by mouth Every 6 (Six) Hours As Needed for Moderate Pain. 20 tablet 0    pantoprazole (PROTONIX) 40 MG EC tablet Take 1 tablet by mouth 2 (Two) Times a Day As Needed.      phenazopyridine (PYRIDIUM) 100 MG tablet Take 1 tablet by mouth 3 (Three) Times a Day As Needed for Bladder Spasms. 30 tablet 1    phenazopyridine (Pyridium) 200 MG tablet Take 1 tablet by mouth 3 (Three) Times a Day As Needed for Bladder Spasms. 20 tablet 0    polyethylene glycol (MIRALAX) 17 GM/SCOOP powder Take 17 g by mouth Daily. 225 g 0    potassium chloride (KLOR-CON M20) 20 MEQ CR tablet Take 1 tablet by mouth Daily. 10 tablet 0    promethazine (PHENERGAN) 25 MG tablet Take 1 tablet by mouth Every 6 (Six) Hours As Needed for Nausea or Vomiting. 28 tablet 4    promethazine (PHENERGAN) 25 MG tablet Take 1 tablet by mouth Every 6 (Six) Hours As Needed for Nausea or Vomiting. 21 tablet 5    sertraline (ZOLOFT) 100 MG tablet Take 1 tablet by mouth 2 (Two) Times a Day.      SUMAtriptan (IMITREX) 6 MG/0.5ML injection Inject prescribed dose at onset of headache. May repeat dose one time in 1 hour(s) if headache not relieved.      traMADol (ULTRAM) 50 MG tablet Take 1 tablet by mouth Every 8 (Eight) Hours As Needed for Moderate Pain. 12 tablet 0    terazosin (HYTRIN) 2 MG capsule Take 1 capsule by mouth Every Night for 14 days. 14 capsule 0     No current facility-administered medications for this visit.        Allergies:      Allergies   Allergen Reactions    Ativan [Lorazepam] Hallucinations     confusion    Sulfa Antibiotics Shortness Of Breath and Swelling    Sulfa Antibiotics Anaphylaxis    Reglan [Metoclopramide] Angioedema     Compazine [Prochlorperazine Edisylate] Hives    Demerol [Meperidine] Hives    Droperidol Itching    Metoclopramide Swelling    Toradol [Ketorolac Tromethamine] Hives and Itching    Toradol [Ketorolac Tromethamine] Hives    Zosyn [Piperacillin Sod-Tazobactam So] Hives        Past Surgical History:     Past Surgical History:   Procedure Laterality Date    ANAL SCOPE N/A 2016    Procedure: ANAL SCOPE;  Surgeon: Kael Lopez MD;  Location: [facility] OR;  Service:     APPENDECTOMY      COLONOSCOPY N/A 2016    Procedure: COLONOSCOPY  CPTCODE:69496;  Surgeon: Jose Antonio Belle III, MD;  Location: [facility] OR;  Service:     COLONOSCOPY N/A 2016    Procedure: COLONOSCOPY (44933) CPT;  Surgeon: Jose Antonio Belle III, MD;  Location: [facility] OR;  Service:     CYSTOSCOPY RETROGRADE PYELOGRAM Bilateral 2017    Procedure: CYSTOSCOPY RETROGRADE PYELOGRAM;  Surgeon: Mu Blunt MD;  Location: [facility] OR;  Service:     ENDOSCOPY N/A 2016    Procedure: ESOPHAGOGASTRODUODENOSCOPY WITH BIOPSY  CPTCODE:76744;  Surgeon: Jose Antonio Belle III, MD;  Location: [facility] OR;  Service:     HEMORRHOIDECTOMY N/A 2016    Procedure: HEMORRHOID STAPLING;  Surgeon: Kael Lopez MD;  Location: [facility] OR;  Service:     HYSTERECTOMY      KNEE SURGERY      LAPAROSCOPIC SALPINGOOPHERECTOMY      PORTACATH PLACEMENT N/A 2017    Procedure: INSERTION OF PORTACATH;  Surgeon: Celso Arredondo MD;  Location: [facility] OR;  Service:     SHOULDER SURGERY      3 times       Social History:     Social History     Socioeconomic History    Marital status:    Tobacco Use    Smoking status: Former     Current packs/day: 0.00     Average packs/day: 1 pack/day for 20.0 years (20.0 ttl pk-yrs)     Types: Cigarettes     Start date: 1995     Quit date: 2015     Years since quittin.3     Passive exposure: Past    Smokeless tobacco: Never   Vaping Use    Vaping status: Never Used   Substance and Sexual  Activity    Alcohol use: No    Drug use: Yes     Types: Marijuana     Comment: for pain    Sexual activity: Defer     Birth control/protection: Surgical       Family History:     Family History   Problem Relation Age of Onset    Crohn's disease Other     Hypertension Other     Diabetes Other     Irritable bowel syndrome Other     No Known Problems Father     No Known Problems Mother        Review of Systems:     Review of Systems   Constitutional: Negative.  Negative for activity change, appetite change, chills, diaphoresis, fatigue and unexpected weight change.   HENT:  Negative for congestion, dental problem, drooling, ear discharge, ear pain, facial swelling, hearing loss, mouth sores, nosebleeds, postnasal drip, rhinorrhea, sinus pressure, sneezing, sore throat, tinnitus, trouble swallowing and voice change.    Eyes: Negative.  Negative for photophobia, pain, discharge, redness, itching and visual disturbance.   Respiratory: Negative.  Negative for apnea, cough, choking, chest tightness, shortness of breath, wheezing and stridor.    Cardiovascular: Negative.  Negative for chest pain, palpitations and leg swelling.   Gastrointestinal: Negative.  Negative for abdominal distention, abdominal pain, anal bleeding, blood in stool, constipation, diarrhea, nausea, rectal pain and vomiting.   Endocrine: Negative.  Negative for cold intolerance, heat intolerance, polydipsia, polyphagia and polyuria.   Genitourinary:  Positive for difficulty urinating and hematuria.   Musculoskeletal: Negative.  Negative for arthralgias, back pain, gait problem, joint swelling, myalgias, neck pain and neck stiffness.   Skin: Negative.  Negative for color change, pallor, rash and wound.   Allergic/Immunologic: Negative.  Negative for environmental allergies, food allergies and immunocompromised state.   Neurological: Negative.  Negative for dizziness, tremors, seizures, syncope, facial asymmetry, speech difficulty, weakness,  light-headedness, numbness and headaches.   Hematological: Negative.  Negative for adenopathy. Does not bruise/bleed easily.   Psychiatric/Behavioral:  Negative for agitation, behavioral problems, confusion, decreased concentration, dysphoric mood, hallucinations, self-injury, sleep disturbance and suicidal ideas. The patient is not nervous/anxious and is not hyperactive.    All other systems reviewed and are negative.      Physical Exam:     Physical Exam  Constitutional:       Appearance: She is well-developed.   HENT:      Head: Normocephalic and atraumatic.      Right Ear: External ear normal.      Left Ear: External ear normal.   Eyes:      Conjunctiva/sclera: Conjunctivae normal.      Pupils: Pupils are equal, round, and reactive to light.   Cardiovascular:      Rate and Rhythm: Normal rate and regular rhythm.      Heart sounds: Normal heart sounds.   Pulmonary:      Effort: Pulmonary effort is normal.      Breath sounds: Normal breath sounds.   Abdominal:      General: Bowel sounds are normal. There is no distension.      Palpations: Abdomen is soft. There is no mass.      Tenderness: There is no abdominal tenderness. There is no guarding or rebound.   Genitourinary:     General: Normal vulva.      Vagina: No vaginal discharge.   Musculoskeletal:         General: Normal range of motion.   Skin:     General: Skin is warm and dry.   Neurological:      Mental Status: She is alert.      Deep Tendon Reflexes: Reflexes are normal and symmetric.   Psychiatric:         Behavior: Behavior normal.         Thought Content: Thought content normal.         Judgment: Judgment normal.         I have reviewed the following portions of the patient's history: Allergies, current medications, past family history, past medical history, past social history, past surgical history, problem list, and ROS and confirm it is accurate.    Recent Image (CT and/or KUB):      CT Abdomen and Pelvis: No results found for this or any previous  visit.       CT Stone Protocol: Results for orders placed during the hospital encounter of 10/10/23    CT Abdomen Pelvis Stone Protocol    Narrative  CT ABDOMEN PELVIS STONE PROTOCOL-: 10/10/2023 9:19 PM    REASON FOR EXAM: Flank pain, kidney stone suspected LT FLANK PAIN.    TECHNIQUE: Noncontrast 4 mm axial sections with sagittal and coronal  reformats.    COMPARISON: 3/16/2023.    FINDINGS: There is focal bronchiectasis within the right lower lobe  (image 4/59), now with probable mucous plugging.  Noting the noncontrast  technique, the liver is unremarkable.  No focal liver lesions are  identified.  Best demonstrated on narrow windows, there is the  suggestion of cholelithiasis dependently within the gallbladder.  There  is no biliary ductal dilatation.  The spleen is at the upper limits of  normal in size.  The pancreas, adrenal glands and kidneys are within  normal limits, noting a probable cyst within the lower pole of the right  kidney (image 3/37).  There is no nephrolithiasis.  There is no abnormal  dilatation of either renal collecting system or ureter.  There is no  ureterolithiasis.  The abdominal aorta is normal in caliber, noting mild  vascular calcification.  There is a surgical anastomosis at the  rectoanal junction.  No complication is evident.  There are no findings  of bowel obstruction.  Noting the lack of enteric contrast material,  there is no abnormal bowel wall thickening.  The appendix is not  identified, however there are no findings of appendicitis.  There is no  free intraperitoneal fluid.  The uterus is absent.  The ovaries are not  identified.  There are no abnormal adnexal masses.  There is no lymph  node enlargement.  The bladder is unremarkable.  There is an old  fracture of L2.    Impression  1.  No acute abnormality of the abdomen or pelvis.  There is no  nephrolithiasis or ureterolithiasis.  2.  Incidental findings, as above, similar in appearance to 3/16/2023.      This report  was finalized on 10/10/2023 11:05 PM by Steph Fabian MD.       KUB: Results for orders placed during the hospital encounter of 01/10/23    XR Abdomen KUB    Narrative  EXAM:  XR Abdomen, 1 View    EXAM DATE:  1/10/2023 12:05 PM    CLINICAL HISTORY:  flank pain    TECHNIQUE:  Frontal supine view of the abdomen/pelvis.    COMPARISON:  10/01/2022    FINDINGS:  Gastrointestinal tract:  Unremarkable.  No dilation.  Organs:  No stones identified along the expected course of ureters.  Bones/joints:  Unremarkable.  Soft tissues:  Surgical staples in the lower pelvis.  Vasculature:  Stable phleboliths lower pelvis.    Impression  1.  No acute findings radiographically evident.  2.  No radiographic evidence of renal or ureteral stones.    This report was finalized on 1/10/2023 12:47 PM by Dr. Ankit Naik MD.       Labs (past 3 months):      No visits with results within 3 Month(s) from this visit.   Latest known visit with results is:   Admission on 08/15/2024, Discharged on 08/16/2024   Component Date Value Ref Range Status    Glucose 08/15/2024 80  65 - 99 mg/dL Final    BUN 08/15/2024 6  6 - 20 mg/dL Final    Creatinine 08/15/2024 0.56 (L)  0.57 - 1.00 mg/dL Final    Sodium 08/15/2024 142  136 - 145 mmol/L Final    Potassium 08/15/2024 4.1  3.5 - 5.2 mmol/L Final    Slight hemolysis detected by analyzer. Result may be falsely elevated.    Chloride 08/15/2024 107  98 - 107 mmol/L Final    CO2 08/15/2024 25.2  22.0 - 29.0 mmol/L Final    Calcium 08/15/2024 9.1  8.6 - 10.5 mg/dL Final    Total Protein 08/15/2024 6.5  6.0 - 8.5 g/dL Final    Albumin 08/15/2024 4.1  3.5 - 5.2 g/dL Final    ALT (SGPT) 08/15/2024 58 (H)  1 - 33 U/L Final    AST (SGOT) 08/15/2024 47 (H)  1 - 32 U/L Final    Alkaline Phosphatase 08/15/2024 105  39 - 117 U/L Final    Total Bilirubin 08/15/2024 0.5  0.0 - 1.2 mg/dL Final    Globulin 08/15/2024 2.4  gm/dL Final    A/G Ratio 08/15/2024 1.7  g/dL Final    BUN/Creatinine Ratio 08/15/2024 10.7   7.0 - 25.0 Final    Anion Gap 08/15/2024 9.8  5.0 - 15.0 mmol/L Final    eGFR 08/15/2024 112.0  >60.0 mL/min/1.73 Final    Lipase 08/15/2024 24  13 - 60 U/L Final    Extra Tube 08/15/2024 Hold for add-ons.   Final    Auto resulted.    Extra Tube 08/15/2024 hold for add-on   Final    Auto resulted    Extra Tube 08/15/2024 Hold for add-ons.   Final    Auto resulted.    Extra Tube 08/15/2024 Hold for add-ons.   Final    Auto resulted    WBC 08/15/2024 6.59  3.40 - 10.80 10*3/mm3 Final    RBC 08/15/2024 5.17  3.77 - 5.28 10*6/mm3 Final    Hemoglobin 08/15/2024 16.2 (H)  12.0 - 15.9 g/dL Final    Hematocrit 08/15/2024 48.3 (H)  34.0 - 46.6 % Final    MCV 08/15/2024 93.4  79.0 - 97.0 fL Final    MCH 08/15/2024 31.3  26.6 - 33.0 pg Final    MCHC 08/15/2024 33.5  31.5 - 35.7 g/dL Final    RDW 08/15/2024 12.9  12.3 - 15.4 % Final    RDW-SD 08/15/2024 44.2  37.0 - 54.0 fl Final    MPV 08/15/2024 10.2  6.0 - 12.0 fL Final    Platelets 08/15/2024 159  140 - 450 10*3/mm3 Final    Neutrophil % 08/15/2024 56.5  42.7 - 76.0 % Final    Lymphocyte % 08/15/2024 34.3  19.6 - 45.3 % Final    Monocyte % 08/15/2024 6.8  5.0 - 12.0 % Final    Eosinophil % 08/15/2024 1.8  0.3 - 6.2 % Final    Basophil % 08/15/2024 0.3  0.0 - 1.5 % Final    Immature Grans % 08/15/2024 0.3  0.0 - 0.5 % Final    Neutrophils, Absolute 08/15/2024 3.72  1.70 - 7.00 10*3/mm3 Final    Lymphocytes, Absolute 08/15/2024 2.26  0.70 - 3.10 10*3/mm3 Final    Monocytes, Absolute 08/15/2024 0.45  0.10 - 0.90 10*3/mm3 Final    Eosinophils, Absolute 08/15/2024 0.12  0.00 - 0.40 10*3/mm3 Final    Basophils, Absolute 08/15/2024 0.02  0.00 - 0.20 10*3/mm3 Final    Immature Grans, Absolute 08/15/2024 0.02  0.00 - 0.05 10*3/mm3 Final    nRBC 08/15/2024 0.0  0.0 - 0.2 /100 WBC Final    Extra Tube 08/15/2024 hold for add-on   Final    Auto resulted        Procedure:   Urethral dilation-after an appropriate informed consent, the patient was brought to the procedure suite.  The  urethra was gently anesthetized with 10 mL of 2% viscous Xylocaine jelly.  After an adequate period of topical anesthesia I went ahead and gently anesthetized and dilated with Humphreys sounds from 16 to 30 Arabic sequentially without complication. The patient was given gentamicin as prophylaxis with 80 mg.    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  Narcotic pain medication-patient has significant acute pain that I believe would be an indication for the use of narcotic pain medication.  I discussed the significant risks of pain medication and the fact that this will be a short only option and I will give her no more than a three-day supply of pain medication, I will not plan long-term medication, and that this will be sent to a pain clinic if it at all becomes necessary.  We discussed signing a pain medication agreement and the fact that we're going to run a state [place] review to be sure the patient is not getting pain medication from elsewhere.  If this is the case, we will not give pain medication as part of the patient's treatment plan of there being prescribed a controlled substance with potential for abuse.  This patient has been well aware of the appropriate dose of such medications including the risks for somnolence, limited ability to drive and/or safety and the significant potential for overdose.  It has been made clear that these medications are for the prescribed patient only without concomitant use of alcohol or other substance unless prescribed by the medical provider.  Has completed prescribing agreement detailing the terms of continue prescribing him a controlled substance including monitoring [place] reports, the possibility of urine drug screens, and pill counts.  The patient is aware that we review [place]ERT[place] reports on a regular basis and scan them into the chart.  History and physical examination exhibited continued safe and appropriate use of controlled  substances. We also discussed the fact that the new [place] legislation allows only a three-day prescription for pain medication.  In this situation he will be referred to a chronic pain clinic.  Nausea and vomiting-patient has significant nausea and vomiting as a consequence of this.  We recommended Phenergan.  We also discussed the use of Zofran and the sedating side effects of Phenergan as well.            This document has been electronically signed by VERENICE FINK MD February 24, 2025 13:21 EST    Dictated Utilizing Dragon Dictation: Part of this note may be an electronic transcription/translation of spoken language to printed text using the Dragon Dictation System.

## 2025-04-07 ENCOUNTER — OFFICE VISIT (OUTPATIENT)
Dept: UROLOGY | Facility: CLINIC | Age: 50
End: 2025-04-07
Payer: COMMERCIAL

## 2025-04-07 VITALS
SYSTOLIC BLOOD PRESSURE: 142 MMHG | DIASTOLIC BLOOD PRESSURE: 84 MMHG | BODY MASS INDEX: 16.22 KG/M2 | HEART RATE: 84 BPM | WEIGHT: 107 LBS | HEIGHT: 68 IN

## 2025-04-07 DIAGNOSIS — N35.028 OTHER POST-TRAUMATIC URETHRAL STRICTURE, FEMALE: Primary | ICD-10-CM

## 2025-04-07 DIAGNOSIS — N23 RENAL COLIC: ICD-10-CM

## 2025-04-07 DIAGNOSIS — R35.0 FREQUENCY OF MICTURITION: ICD-10-CM

## 2025-04-07 PROCEDURE — 1160F RVW MEDS BY RX/DR IN RCRD: CPT | Performed by: UROLOGY

## 2025-04-07 PROCEDURE — 53661 DILATION OF URETHRA: CPT | Performed by: UROLOGY

## 2025-04-07 PROCEDURE — 1159F MED LIST DOCD IN RCRD: CPT | Performed by: UROLOGY

## 2025-04-07 PROCEDURE — 96372 THER/PROPH/DIAG INJ SC/IM: CPT | Performed by: UROLOGY

## 2025-04-07 RX ORDER — PROMETHAZINE HYDROCHLORIDE 25 MG/1
25 TABLET ORAL EVERY 6 HOURS PRN
Qty: 21 TABLET | Refills: 5 | Status: SHIPPED | OUTPATIENT
Start: 2025-04-07

## 2025-04-07 RX ORDER — GENTAMICIN 40 MG/ML
80 INJECTION, SOLUTION INTRAMUSCULAR; INTRAVENOUS ONCE
Status: COMPLETED | OUTPATIENT
Start: 2025-04-07 | End: 2025-04-07

## 2025-04-07 RX ORDER — OXYCODONE AND ACETAMINOPHEN 10; 325 MG/1; MG/1
1 TABLET ORAL EVERY 6 HOURS PRN
Qty: 20 TABLET | Refills: 0 | Status: SHIPPED | OUTPATIENT
Start: 2025-04-07

## 2025-04-07 RX ADMIN — GENTAMICIN 80 MG: 40 INJECTION, SOLUTION INTRAMUSCULAR; INTRAVENOUS at 13:48

## 2025-04-07 NOTE — PROGRESS NOTES
Chief Complaint:      Chief Complaint   Patient presents with    Urethral stricture     Dilation        HPI:   50 y.o. female here for urethral dilation.    Past Medical History:     Past Medical History:   Diagnosis Date    Hoyos's disease     Bipolar 1 disorder     Constipation     DDD (degenerative disc disease), cervical 05/29/2017    Fibromyalgia     IBS (irritable bowel syndrome)     Meningioma     Migraine     PONV (postoperative nausea and vomiting)     PTSD (post-traumatic stress disorder)     Rectal bleeding        Current Meds:     Current Outpatient Medications   Medication Sig Dispense Refill    oxyCODONE-acetaminophen (Percocet)  MG per tablet Take 1 tablet by mouth Every 6 (Six) Hours As Needed for Moderate Pain. 20 tablet 0    promethazine (PHENERGAN) 25 MG tablet Take 1 tablet by mouth Every 6 (Six) Hours As Needed for Nausea or Vomiting. 21 tablet 5    albuterol (PROVENTIL HFA;VENTOLIN HFA) 108 (90 Base) MCG/ACT inhaler Inhale 2 puffs 2 (Two) Times a Day.      Azelastine HCl 137 MCG/SPRAY solution Administer 1 spray into the nostril(s) as directed by provider As Needed (Allergies).      busPIRone (BUSPAR) 15 MG tablet Take 1 tablet by mouth 3 (Three) Times a Day.      diclofenac (VOLTAREN) 1 % gel gel       divalproex (DEPAKOTE) 500 MG DR tablet Take 1 tablet by mouth 3 (Three) Times a Day. 90 tablet 2    docusate sodium (COLACE) 100 MG capsule Take 1 capsule by mouth 2 (Two) Times a Day. 20 capsule 0    fluticasone (VERAMYST) 27.5 MCG/SPRAY nasal spray Administer 2 sprays into the nostril(s) as directed by provider Daily.      magnesium 30 MG tablet Take 1 tablet by mouth Daily. 10 tablet 0    megestrol (MEGACE) 20 MG tablet Take 1 tablet by mouth Daily. 30 tablet 3    methylPREDNISolone (MEDROL) 4 MG dose pack Take as directed on package instructions. 21 tablet 0    metroNIDAZOLE (FLAGYL) 500 MG tablet Take 1 tablet by mouth 3 (Three) Times a Day. 30 tablet 0    montelukast (SINGULAIR) 10  MG tablet Take 1 tablet by mouth Every Night.      naloxone (NARCAN) 4 MG/0.1ML nasal spray Call 911. Don't prime. [place] in 1 nostril for overdose. Repeat in 2-3 minutes in other nostril if no or minimal breathing/responsiveness. 2 each 0    nitrofurantoin, macrocrystal-monohydrate, (MACROBID) 100 MG capsule Take 1 capsule by mouth 2 (Two) Times a Day. 13 capsule 0    ondansetron ODT (ZOFRAN-ODT) 4 MG disintegrating tablet Place 2 tablets on the tongue Every 8 (Eight) Hours As Needed for Nausea or Vomiting. 30 tablet 0    pantoprazole (PROTONIX) 40 MG EC tablet Take 1 tablet by mouth 2 (Two) Times a Day As Needed.      phenazopyridine (PYRIDIUM) 100 MG tablet Take 1 tablet by mouth 3 (Three) Times a Day As Needed for Bladder Spasms. 30 tablet 1    phenazopyridine (Pyridium) 200 MG tablet Take 1 tablet by mouth 3 (Three) Times a Day As Needed for Bladder Spasms. 20 tablet 0    polyethylene glycol (MIRALAX) 17 GM/SCOOP powder Take 17 g by mouth Daily. 225 g 0    potassium chloride (KLOR-CON M20) 20 MEQ CR tablet Take 1 tablet by mouth Daily. 10 tablet 0    promethazine (PHENERGAN) 25 MG tablet Take 1 tablet by mouth Every 6 (Six) Hours As Needed for Nausea or Vomiting. 28 tablet 4    sertraline (ZOLOFT) 100 MG tablet Take 1 tablet by mouth 2 (Two) Times a Day.      SUMAtriptan (IMITREX) 6 MG/0.5ML injection Inject prescribed dose at onset of headache. May repeat dose one time in 1 hour(s) if headache not relieved.      terazosin (HYTRIN) 2 MG capsule Take 1 capsule by mouth Every Night for 14 days. 14 capsule 0    traMADol (ULTRAM) 50 MG tablet Take 1 tablet by mouth Every 8 (Eight) Hours As Needed for Moderate Pain. 12 tablet 0     No current facility-administered medications for this visit.        Allergies:      Allergies   Allergen Reactions    Ativan [Lorazepam] Hallucinations     confusion    Sulfa Antibiotics Shortness Of Breath and Swelling    Sulfa Antibiotics Anaphylaxis    Reglan [Metoclopramide] Angioedema     Compazine [Prochlorperazine Edisylate] Hives    Demerol [Meperidine] Hives    Droperidol Itching    Metoclopramide Swelling    Toradol [Ketorolac Tromethamine] Hives and Itching    Toradol [Ketorolac Tromethamine] Hives    Zosyn [Piperacillin Sod-Tazobactam So] Hives        Past Surgical History:     Past Surgical History:   Procedure Laterality Date    ANAL SCOPE N/A 2016    Procedure: ANAL SCOPE;  Surgeon: Kael Lopez MD;  Location: [facility] OR;  Service:     APPENDECTOMY      COLONOSCOPY N/A 2016    Procedure: COLONOSCOPY  CPTCODE:16421;  Surgeon: Jose Antonio Belle III, MD;  Location: [facility] OR;  Service:     COLONOSCOPY N/A 2016    Procedure: COLONOSCOPY (27474) CPT;  Surgeon: Jose Antonio Belle III, MD;  Location: [facility] OR;  Service:     CYSTOSCOPY RETROGRADE PYELOGRAM Bilateral 2017    Procedure: CYSTOSCOPY RETROGRADE PYELOGRAM;  Surgeon: Mu Blunt MD;  Location: [facility] OR;  Service:     ENDOSCOPY N/A 2016    Procedure: ESOPHAGOGASTRODUODENOSCOPY WITH BIOPSY  CPTCODE:48431;  Surgeon: Jose Antonio Belle III, MD;  Location: [facility] OR;  Service:     HEMORRHOIDECTOMY N/A 2016    Procedure: HEMORRHOID STAPLING;  Surgeon: Kael Lopez MD;  Location: [facility] OR;  Service:     HYSTERECTOMY      KNEE SURGERY      LAPAROSCOPIC SALPINGOOPHERECTOMY      PORTACATH PLACEMENT N/A 2017    Procedure: INSERTION OF PORTACATH;  Surgeon: Celso Arredondo MD;  Location: [facility] OR;  Service:     SHOULDER SURGERY      3 times       Social History:     Social History     Socioeconomic History    Marital status:    Tobacco Use    Smoking status: Former     Current packs/day: 0.00     Average packs/day: 1 pack/day for 20.0 years (20.0 ttl pk-yrs)     Types: Cigarettes     Start date: 1995     Quit date: 2015     Years since quittin.4     Passive exposure: Past    Smokeless tobacco: Never   Vaping Use    Vaping status: Never Used   Substance and Sexual  Activity    Alcohol use: No    Drug use: Yes     Types: Marijuana     Comment: for pain    Sexual activity: Defer     Birth control/protection: Surgical       Family History:     Family History   Problem Relation Age of Onset    Crohn's disease Other     Hypertension Other     Diabetes Other     Irritable bowel syndrome Other     No Known Problems Father     No Known Problems Mother        Review of Systems:     Review of Systems   Constitutional: Negative.  Negative for activity change, appetite change, chills, diaphoresis, fatigue and unexpected weight change.   HENT:  Negative for congestion, dental problem, drooling, ear discharge, ear pain, facial swelling, hearing loss, mouth sores, nosebleeds, postnasal drip, rhinorrhea, sinus pressure, sneezing, sore throat, tinnitus, trouble swallowing and voice change.    Eyes: Negative.  Negative for photophobia, pain, discharge, redness, itching and visual disturbance.   Respiratory: Negative.  Negative for apnea, cough, choking, chest tightness, shortness of breath, wheezing and stridor.    Cardiovascular: Negative.  Negative for chest pain, palpitations and leg swelling.   Gastrointestinal: Negative.  Negative for abdominal distention, abdominal pain, anal bleeding, blood in stool, constipation, diarrhea, nausea, rectal pain and vomiting.   Endocrine: Negative.  Negative for cold intolerance, heat intolerance, polydipsia, polyphagia and polyuria.   Genitourinary:  Positive for difficulty urinating and dyspareunia.   Musculoskeletal: Negative.  Negative for arthralgias, back pain, gait problem, joint swelling, myalgias, neck pain and neck stiffness.   Skin: Negative.  Negative for color change, pallor, rash and wound.   Allergic/Immunologic: Negative.  Negative for environmental allergies, food allergies and immunocompromised state.   Neurological: Negative.  Negative for dizziness, tremors, seizures, syncope, facial asymmetry, speech difficulty, weakness,  light-headedness, numbness and headaches.   Hematological: Negative.  Negative for adenopathy. Does not bruise/bleed easily.   Psychiatric/Behavioral:  Negative for agitation, behavioral problems, confusion, decreased concentration, dysphoric mood, hallucinations, self-injury, sleep disturbance and suicidal ideas. The patient is not nervous/anxious and is not hyperactive.    All other systems reviewed and are negative.      Physical Exam:     Physical Exam  Constitutional:       Appearance: She is well-developed.   HENT:      Head: Normocephalic and atraumatic.      Right Ear: External ear normal.      Left Ear: External ear normal.   Eyes:      Conjunctiva/sclera: Conjunctivae normal.      Pupils: Pupils are equal, round, and reactive to light.   Cardiovascular:      Rate and Rhythm: Normal rate and regular rhythm.      Heart sounds: Normal heart sounds.   Pulmonary:      Effort: Pulmonary effort is normal.      Breath sounds: Normal breath sounds.   Abdominal:      General: Bowel sounds are normal. There is no distension.      Palpations: Abdomen is soft. There is no mass.      Tenderness: There is no abdominal tenderness. There is no guarding or rebound.   Genitourinary:     General: Normal vulva.      Vagina: No vaginal discharge.   Musculoskeletal:         General: Normal range of motion.   Skin:     General: Skin is warm and dry.   Neurological:      Mental Status: She is alert.      Deep Tendon Reflexes: Reflexes are normal and symmetric.   Psychiatric:         Behavior: Behavior normal.         Thought Content: Thought content normal.         Judgment: Judgment normal.         I have reviewed the following portions of the patient's history: Allergies, current medications, past family history, past medical history, past social history, past surgical history, problem list, and ROS and confirm it is accurate.    Recent Image (CT and/or KUB):      CT Abdomen and Pelvis: No results found for this or any previous  visit.       CT Stone Protocol: Results for orders placed during the hospital encounter of 10/10/23    CT Abdomen Pelvis Stone Protocol    Narrative  CT ABDOMEN PELVIS STONE PROTOCOL-: 10/10/2023 9:19 PM    REASON FOR EXAM: Flank pain, kidney stone suspected LT FLANK PAIN.    TECHNIQUE: Noncontrast 4 mm axial sections with sagittal and coronal  reformats.    COMPARISON: 3/16/2023.    FINDINGS: There is focal bronchiectasis within the right lower lobe  (image 4/59), now with probable mucous plugging.  Noting the noncontrast  technique, the liver is unremarkable.  No focal liver lesions are  identified.  Best demonstrated on narrow windows, there is the  suggestion of cholelithiasis dependently within the gallbladder.  There  is no biliary ductal dilatation.  The spleen is at the upper limits of  normal in size.  The pancreas, adrenal glands and kidneys are within  normal limits, noting a probable cyst within the lower pole of the right  kidney (image 3/37).  There is no nephrolithiasis.  There is no abnormal  dilatation of either renal collecting system or ureter.  There is no  ureterolithiasis.  The abdominal aorta is normal in caliber, noting mild  vascular calcification.  There is a surgical anastomosis at the  rectoanal junction.  No complication is evident.  There are no findings  of bowel obstruction.  Noting the lack of enteric contrast material,  there is no abnormal bowel wall thickening.  The appendix is not  identified, however there are no findings of appendicitis.  There is no  free intraperitoneal fluid.  The uterus is absent.  The ovaries are not  identified.  There are no abnormal adnexal masses.  There is no lymph  node enlargement.  The bladder is unremarkable.  There is an old  fracture of L2.    Impression  1.  No acute abnormality of the abdomen or pelvis.  There is no  nephrolithiasis or ureterolithiasis.  2.  Incidental findings, as above, similar in appearance to 3/16/2023.      This report  was finalized on 10/10/2023 11:05 PM by Steph Fabian MD.       KUB: Results for orders placed during the hospital encounter of 01/10/23    XR Abdomen KUB    Narrative  EXAM:  XR Abdomen, 1 View    EXAM DATE:  1/10/2023 12:05 PM    CLINICAL HISTORY:  flank pain    TECHNIQUE:  Frontal supine view of the abdomen/pelvis.    COMPARISON:  10/01/2022    FINDINGS:  Gastrointestinal tract:  Unremarkable.  No dilation.  Organs:  No stones identified along the expected course of ureters.  Bones/joints:  Unremarkable.  Soft tissues:  Surgical staples in the lower pelvis.  Vasculature:  Stable phleboliths lower pelvis.    Impression  1.  No acute findings radiographically evident.  2.  No radiographic evidence of renal or ureteral stones.    This report was finalized on 1/10/2023 12:47 PM by Dr. Ankit Naik MD.       Labs (past 3 months):      No visits with results within 3 Month(s) from this visit.   Latest known visit with results is:   Admission on 08/15/2024, Discharged on 08/16/2024   Component Date Value Ref Range Status    Glucose 08/15/2024 80  65 - 99 mg/dL Final    BUN 08/15/2024 6  6 - 20 mg/dL Final    Creatinine 08/15/2024 0.56 (L)  0.57 - 1.00 mg/dL Final    Sodium 08/15/2024 142  136 - 145 mmol/L Final    Potassium 08/15/2024 4.1  3.5 - 5.2 mmol/L Final    Slight hemolysis detected by analyzer. Result may be falsely elevated.    Chloride 08/15/2024 107  98 - 107 mmol/L Final    CO2 08/15/2024 25.2  22.0 - 29.0 mmol/L Final    Calcium 08/15/2024 9.1  8.6 - 10.5 mg/dL Final    Total Protein 08/15/2024 6.5  6.0 - 8.5 g/dL Final    Albumin 08/15/2024 4.1  3.5 - 5.2 g/dL Final    ALT (SGPT) 08/15/2024 58 (H)  1 - 33 U/L Final    AST (SGOT) 08/15/2024 47 (H)  1 - 32 U/L Final    Alkaline Phosphatase 08/15/2024 105  39 - 117 U/L Final    Total Bilirubin 08/15/2024 0.5  0.0 - 1.2 mg/dL Final    Globulin 08/15/2024 2.4  gm/dL Final    A/G Ratio 08/15/2024 1.7  g/dL Final    BUN/Creatinine Ratio 08/15/2024 10.7   7.0 - 25.0 Final    Anion Gap 08/15/2024 9.8  5.0 - 15.0 mmol/L Final    eGFR 08/15/2024 112.0  >60.0 mL/min/1.73 Final    Lipase 08/15/2024 24  13 - 60 U/L Final    Extra Tube 08/15/2024 Hold for add-ons.   Final    Auto resulted.    Extra Tube 08/15/2024 hold for add-on   Final    Auto resulted    Extra Tube 08/15/2024 Hold for add-ons.   Final    Auto resulted.    Extra Tube 08/15/2024 Hold for add-ons.   Final    Auto resulted    WBC 08/15/2024 6.59  3.40 - 10.80 10*3/mm3 Final    RBC 08/15/2024 5.17  3.77 - 5.28 10*6/mm3 Final    Hemoglobin 08/15/2024 16.2 (H)  12.0 - 15.9 g/dL Final    Hematocrit 08/15/2024 48.3 (H)  34.0 - 46.6 % Final    MCV 08/15/2024 93.4  79.0 - 97.0 fL Final    MCH 08/15/2024 31.3  26.6 - 33.0 pg Final    MCHC 08/15/2024 33.5  31.5 - 35.7 g/dL Final    RDW 08/15/2024 12.9  12.3 - 15.4 % Final    RDW-SD 08/15/2024 44.2  37.0 - 54.0 fl Final    MPV 08/15/2024 10.2  6.0 - 12.0 fL Final    Platelets 08/15/2024 159  140 - 450 10*3/mm3 Final    Neutrophil % 08/15/2024 56.5  42.7 - 76.0 % Final    Lymphocyte % 08/15/2024 34.3  19.6 - 45.3 % Final    Monocyte % 08/15/2024 6.8  5.0 - 12.0 % Final    Eosinophil % 08/15/2024 1.8  0.3 - 6.2 % Final    Basophil % 08/15/2024 0.3  0.0 - 1.5 % Final    Immature Grans % 08/15/2024 0.3  0.0 - 0.5 % Final    Neutrophils, Absolute 08/15/2024 3.72  1.70 - 7.00 10*3/mm3 Final    Lymphocytes, Absolute 08/15/2024 2.26  0.70 - 3.10 10*3/mm3 Final    Monocytes, Absolute 08/15/2024 0.45  0.10 - 0.90 10*3/mm3 Final    Eosinophils, Absolute 08/15/2024 0.12  0.00 - 0.40 10*3/mm3 Final    Basophils, Absolute 08/15/2024 0.02  0.00 - 0.20 10*3/mm3 Final    Immature Grans, Absolute 08/15/2024 0.02  0.00 - 0.05 10*3/mm3 Final    nRBC 08/15/2024 0.0  0.0 - 0.2 /100 WBC Final    Extra Tube 08/15/2024 hold for add-on   Final    Auto resulted        Procedure:   Urethral dilation-after an appropriate informed consent, the patient was brought to the procedure suite.  The  urethra was gently anesthetized with 10 mL of 2% viscous Xylocaine jelly.  After an adequate period of topical anesthesia I went ahead and  the urethra was gently anesthetized and dilated with [place] sounds from 16 to 26 [place] sequentially without complication. The patient was given gentamicin as prophylaxis with 80 mg.    Assessment/Plan:   Posttraumatic urethral stricture status post dilation  Narcotic pain medication-patient has significant acute pain that I believe would be an indication for the use of narcotic pain medication.  I discussed the significant risks of pain medication and the fact that this will be a short only option and I will give her no more than a three-day supply of pain medication, I will not plan long-term medication, and that this will be sent to a pain clinic if it at all becomes necessary.  We discussed signing a pain medication agreement and the fact that we're going to run a state [place] review to be sure the patient is not getting pain medication from elsewhere.  If this is the case, we will not give pain medication as part of the patient's treatment plan of there being prescribed a controlled substance with potential for abuse.  This patient has been well aware of the appropriate dose of such medications including the risks for somnolence, limited ability to drive and/or safety and the significant potential for overdose.  It has been made clear that these medications are for the prescribed patient only without concomitant use of alcohol or other substance unless prescribed by the medical provider.  Has completed prescribing agreement detailing the terms of continue prescribing him a controlled substance including monitoring [place] reports, the possibility of urine drug screens, and pill counts.  The patient is aware that we review [place]ERT[place] reports on a regular basis and scan them into the chart.  History and physical examination exhibited continued safe and appropriate use of  controlled substances. We also discussed the fact that the new [place] legislation allows only a three-day prescription for pain medication.  In this situation he will be referred to a chronic pain clinic.  Nausea and vomiting-patient has significant nausea and vomiting as a consequence of this.  We recommended Phenergan.  We also discussed the use of Zofran and the sedating side effects of Phenergan as well.          This document has been electronically signed by VERENICE FINK MD April 7, 2025 13:47 EDT    Dictated Utilizing Dragon Dictation: Part of this note may be an electronic transcription/translation of spoken language to printed text using the Dragon Dictation System.

## 2025-04-15 ENCOUNTER — HOSPITAL ENCOUNTER (EMERGENCY)
Facility: HOSPITAL | Age: 50
Discharge: HOME | End: 2025-04-15
Attending: STUDENT IN AN ORGANIZED HEALTH CARE EDUCATION/TRAINING PROGRAM | Admitting: STUDENT IN AN ORGANIZED HEALTH CARE EDUCATION/TRAINING PROGRAM
Payer: COMMERCIAL

## 2025-04-15 VITALS
DIASTOLIC BLOOD PRESSURE: 79 MMHG | WEIGHT: 121 LBS | SYSTOLIC BLOOD PRESSURE: 117 MMHG | HEIGHT: 68 IN | RESPIRATION RATE: 16 BRPM | BODY MASS INDEX: 18.34 KG/M2 | HEART RATE: 77 BPM | OXYGEN SATURATION: 97 % | TEMPERATURE: 97.5 F

## 2025-04-15 DIAGNOSIS — G43.909 MIGRAINE WITHOUT STATUS MIGRAINOSUS, NOT INTRACTABLE, UNSPECIFIED MIGRAINE TYPE: Primary | ICD-10-CM

## 2025-04-15 PROCEDURE — 25010000002 BUTORPHANOL PER 1 MG: Performed by: STUDENT IN AN ORGANIZED HEALTH CARE EDUCATION/TRAINING PROGRAM

## 2025-04-15 PROCEDURE — 96376 TX/PRO/DX INJ SAME DRUG ADON: CPT

## 2025-04-15 PROCEDURE — 25010000002 DEXAMETHASONE PER 1 MG

## 2025-04-15 PROCEDURE — 96375 TX/PRO/DX INJ NEW DRUG ADDON: CPT

## 2025-04-15 PROCEDURE — 63710000001 PROMETHAZINE PER 25 MG

## 2025-04-15 PROCEDURE — 25010000002 HYDROMORPHONE PER 4 MG: Performed by: STUDENT IN AN ORGANIZED HEALTH CARE EDUCATION/TRAINING PROGRAM

## 2025-04-15 PROCEDURE — 25010000002 ONDANSETRON PER 1 MG

## 2025-04-15 PROCEDURE — 99283 EMERGENCY DEPT VISIT LOW MDM: CPT

## 2025-04-15 PROCEDURE — 25010000002 DIPHENHYDRAMINE PER 50 MG

## 2025-04-15 PROCEDURE — 96374 THER/PROPH/DIAG INJ IV PUSH: CPT

## 2025-04-15 PROCEDURE — 25010000002 HEPARIN LOCK FLUSH PER 10 UNITS: Performed by: STUDENT IN AN ORGANIZED HEALTH CARE EDUCATION/TRAINING PROGRAM

## 2025-04-15 RX ORDER — BUTORPHANOL TARTRATE 1 MG/ML
2 INJECTION, SOLUTION INTRAMUSCULAR; INTRAVENOUS ONCE
Status: COMPLETED | OUTPATIENT
Start: 2025-04-15 | End: 2025-04-15

## 2025-04-15 RX ORDER — DIPHENHYDRAMINE HYDROCHLORIDE 50 MG/ML
25 INJECTION, SOLUTION INTRAMUSCULAR; INTRAVENOUS ONCE
Status: COMPLETED | OUTPATIENT
Start: 2025-04-15 | End: 2025-04-15

## 2025-04-15 RX ORDER — ONDANSETRON 2 MG/ML
4 INJECTION INTRAMUSCULAR; INTRAVENOUS ONCE
Status: COMPLETED | OUTPATIENT
Start: 2025-04-15 | End: 2025-04-15

## 2025-04-15 RX ORDER — DEXAMETHASONE SODIUM PHOSPHATE 10 MG/ML
10 INJECTION, SOLUTION INTRA-ARTICULAR; INTRALESIONAL; INTRAMUSCULAR; INTRAVENOUS; SOFT TISSUE ONCE
Status: COMPLETED | OUTPATIENT
Start: 2025-04-15 | End: 2025-04-15

## 2025-04-15 RX ORDER — HYDROMORPHONE HYDROCHLORIDE 1 MG/ML
0.5 INJECTION, SOLUTION INTRAMUSCULAR; INTRAVENOUS; SUBCUTANEOUS ONCE
Refills: 0 | Status: COMPLETED | OUTPATIENT
Start: 2025-04-15 | End: 2025-04-15

## 2025-04-15 RX ORDER — SODIUM CHLORIDE 0.9 % (FLUSH) 0.9 %
10 SYRINGE (ML) INJECTION AS NEEDED
Status: DISCONTINUED | OUTPATIENT
Start: 2025-04-15 | End: 2025-04-15 | Stop reason: HOSPADM

## 2025-04-15 RX ORDER — HEPARIN SODIUM (PORCINE) LOCK FLUSH IV SOLN 100 UNIT/ML 100 UNIT/ML
300 SOLUTION INTRAVENOUS ONCE
Status: COMPLETED | OUTPATIENT
Start: 2025-04-15 | End: 2025-04-15

## 2025-04-15 RX ORDER — PROMETHAZINE HYDROCHLORIDE 25 MG/1
25 TABLET ORAL ONCE
Status: COMPLETED | OUTPATIENT
Start: 2025-04-15 | End: 2025-04-15

## 2025-04-15 RX ADMIN — HYDROMORPHONE HYDROCHLORIDE 0.5 MG: 1 INJECTION, SOLUTION INTRAMUSCULAR; INTRAVENOUS; SUBCUTANEOUS at 15:27

## 2025-04-15 RX ADMIN — DEXAMETHASONE SODIUM PHOSPHATE 10 MG: 10 INJECTION INTRAMUSCULAR; INTRAVENOUS at 14:26

## 2025-04-15 RX ADMIN — PROMETHAZINE HYDROCHLORIDE 25 MG: 25 TABLET ORAL at 15:06

## 2025-04-15 RX ADMIN — DIPHENHYDRAMINE HYDROCHLORIDE 25 MG: 50 INJECTION, SOLUTION INTRAMUSCULAR; INTRAVENOUS at 14:24

## 2025-04-15 RX ADMIN — HEPARIN 300 UNITS: 100 SYRINGE at 15:31

## 2025-04-15 RX ADMIN — ONDANSETRON 4 MG: 2 INJECTION INTRAMUSCULAR; INTRAVENOUS at 14:23

## 2025-04-15 RX ADMIN — BUTORPHANOL TARTRATE 2 MG: 1 INJECTION, SOLUTION INTRAMUSCULAR; INTRAVENOUS at 14:27

## 2025-04-15 RX ADMIN — ONDANSETRON 4 MG: 2 INJECTION INTRAMUSCULAR; INTRAVENOUS at 15:27

## 2025-04-15 NOTE — ED PROVIDER NOTES
Subjective   History of Present Illness  Patient is a 50-year-old female who presents today with complaints of a migraine headache.  She reports that she began with a headache yesterday and it has since worsened.  She reports she has tried her at home medications with no relief.  She reports she has a history of migraines and this feels like her normal migraine that she has.  She denies visual changes but reports that she has had nausea and vomiting.  She reports that she has been under increased stress at home lately and is concerned that that is what brought on her headache.  She denies any other complaints.  She presents private vehicle with her .        Review of Systems   Constitutional: Negative.  Negative for fever.   HENT: Negative.     Respiratory: Negative.     Cardiovascular: Negative.  Negative for chest pain.   Gastrointestinal: Negative.  Positive for nausea and vomiting. Negative for abdominal pain.   Endocrine: Negative.    Genitourinary: Negative.  Negative for dysuria.   Skin: Negative.    Neurological: Negative.  Positive for headaches. Negative for dizziness and numbness.   Psychiatric/Behavioral: Negative.     All other systems reviewed and are negative.      Past Medical History:   Diagnosis Date    Hoyos's disease     Bipolar 1 disorder     Constipation     DDD (degenerative disc disease), cervical 05/29/2017    Fibromyalgia     IBS (irritable bowel syndrome)     Meningioma     Migraine     PONV (postoperative nausea and vomiting)     PTSD (post-traumatic stress disorder)     Rectal bleeding        Allergies   Allergen Reactions    Ativan [Lorazepam] Hallucinations     confusion    Sulfa Antibiotics Shortness Of Breath and Swelling    Sulfa Antibiotics Anaphylaxis    Reglan [Metoclopramide] Angioedema    Compazine [Prochlorperazine Edisylate] Hives    Demerol [Meperidine] Hives    Droperidol Itching    Metoclopramide Swelling    Toradol [Ketorolac Tromethamine] Hives and Itching     Toradol [Ketorolac Tromethamine] Hives    Zosyn [Piperacillin Sod-Tazobactam So] Hives       Past Surgical History:   Procedure Laterality Date    ANAL SCOPE N/A 2016    Procedure: ANAL SCOPE;  Surgeon: Kael Lopez MD;  Location: [facility] OR;  Service:     APPENDECTOMY      COLONOSCOPY N/A 2016    Procedure: COLONOSCOPY  CPTCODE:72606;  Surgeon: Jose Antonio Belle III, MD;  Location: [facility] OR;  Service:     COLONOSCOPY N/A 2016    Procedure: COLONOSCOPY (87352) CPT;  Surgeon: Jose Antonio Belle III, MD;  Location: [facility] OR;  Service:     CYSTOSCOPY RETROGRADE PYELOGRAM Bilateral 2017    Procedure: CYSTOSCOPY RETROGRADE PYELOGRAM;  Surgeon: Mu Blunt MD;  Location: [facility] OR;  Service:     ENDOSCOPY N/A 2016    Procedure: ESOPHAGOGASTRODUODENOSCOPY WITH BIOPSY  CPTCODE:13146;  Surgeon: Jose Antonio Belle III, MD;  Location: [facility] OR;  Service:     HEMORRHOIDECTOMY N/A 2016    Procedure: HEMORRHOID STAPLING;  Surgeon: Kael Lopez MD;  Location: [facility] OR;  Service:     HYSTERECTOMY      KNEE SURGERY      LAPAROSCOPIC SALPINGOOPHERECTOMY      PORTACATH PLACEMENT N/A 2017    Procedure: INSERTION OF PORTACATH;  Surgeon: Celso Arredondo MD;  Location: [facility] OR;  Service:     SHOULDER SURGERY      3 times       Family History   Problem Relation Age of Onset    Crohn's disease Other     Hypertension Other     Diabetes Other     Irritable bowel syndrome Other     No Known Problems Father     No Known Problems Mother        Social History     Socioeconomic History    Marital status:    Tobacco Use    Smoking status: Former     Current packs/day: 0.00     Average packs/day: 1 pack/day for 20.0 years (20.0 ttl pk-yrs)     Types: Cigarettes     Start date: 1995     Quit date: 2015     Years since quittin.4     Passive exposure: Past    Smokeless tobacco: Never   Vaping Use    Vaping status: Never Used   Substance and Sexual Activity    Alcohol  use: No    Drug use: Yes     Types: Marijuana     Comment: for pain    Sexual activity: Defer     Birth control/protection: Surgical           Objective   Physical Exam  Vitals and nursing note reviewed.   Constitutional:       General: She is not in acute distress.     Appearance: She is well-developed. She is ill-appearing. She is not diaphoretic.   HENT:      Head: Normocephalic and atraumatic.      Right Ear: External ear normal.      Left Ear: External ear normal.      Nose: Nose normal.   Eyes:      Conjunctiva/sclera: Conjunctivae normal.      Pupils: Pupils are equal, round, and reactive to light.   Neck:      Vascular: No JVD.      Trachea: No tracheal deviation.   Cardiovascular:      Rate and Rhythm: Normal rate and regular rhythm.      Heart sounds: Normal heart sounds. No murmur heard.  Pulmonary:      Effort: Pulmonary effort is normal. No respiratory distress.      Breath sounds: Normal breath sounds. No wheezing.   Abdominal:      General: Bowel sounds are normal.      Palpations: Abdomen is soft.      Tenderness: There is no abdominal tenderness.   Musculoskeletal:         General: No deformity. Normal range of motion.      Cervical back: Normal range of motion and neck supple.   Skin:     General: Skin is warm and dry.      Coloration: Skin is pale.      Findings: No erythema or rash.   Neurological:      Mental Status: She is alert and oriented to person, place, and time.      Cranial Nerves: No cranial nerve deficit.   Psychiatric:         Behavior: Behavior normal.         Thought Content: Thought content normal.         Procedures           ED Course  ED Course as of 04/15/25 1924   Tue Apr 15, 2025   1518 Patient requesting additional dose of medication through her port and to be discharged home. [KH]      ED Course User Index  [KH] Olga Oscar, APRN                                                       Medical Decision Making  Patient is a 50-year-old female who presents today with  complaints of a migraine headache.  She reports that she began with a headache yesterday and it has since worsened.  She reports she has tried her at home medications with no relief.  She reports she has a history of migraines and this feels like her normal migraine that she has.  She denies visual changes but reports that she has had nausea and vomiting.  She reports that she has been under increased stress at home lately and is concerned that that is what brought on her headache.  She denies any other complaints.  She presents private vehicle with her .    Problems Addressed:  Migraine without status migrainosus, not intractable, unspecified migraine type: complicated acute illness or injury    Risk  Prescription drug management.        Final diagnoses:   Migraine without status migrainosus, not intractable, unspecified migraine type       ED Disposition  ED Disposition       ED Disposition   Discharge    Condition   Stable    Comment   --               Mabel Patterson, APRN  [address]  [address] [address]  [phone]    Schedule an appointment as soon as possible for a visit   As needed    [facility] EMERGENCY DEPARTMENT  [address] [place] [id number] [phone]  Go to   If symptoms worsen         Medication List      No changes were made to your prescriptions during this visit.            Olga Oscra, APRN  04/15/25 192

## 2025-04-17 ENCOUNTER — HOSPITAL ENCOUNTER (EMERGENCY)
Facility: HOSPITAL | Age: 50
Discharge: HOME | End: 2025-04-17
Payer: COMMERCIAL

## 2025-04-17 VITALS
RESPIRATION RATE: 16 BRPM | DIASTOLIC BLOOD PRESSURE: 99 MMHG | HEART RATE: 72 BPM | SYSTOLIC BLOOD PRESSURE: 123 MMHG | OXYGEN SATURATION: 100 % | TEMPERATURE: 98.4 F | WEIGHT: 115 LBS | BODY MASS INDEX: 17.43 KG/M2 | HEIGHT: 68 IN

## 2025-04-17 DIAGNOSIS — R31.9 HEMATURIA, UNSPECIFIED TYPE: Primary | ICD-10-CM

## 2025-04-17 DIAGNOSIS — G43.709 CHRONIC MIGRAINE WITHOUT AURA WITHOUT STATUS MIGRAINOSUS, NOT INTRACTABLE: ICD-10-CM

## 2025-04-17 LAB
BACTERIA UR QL AUTO: ABNORMAL /HPF
BILIRUB UR QL STRIP: NEGATIVE
CLARITY UR: CLEAR
COLOR UR: YELLOW
GLUCOSE UR STRIP-MCNC: NEGATIVE MG/DL
HGB UR QL STRIP.AUTO: ABNORMAL
HYALINE CASTS UR QL AUTO: ABNORMAL /LPF
KETONES UR QL STRIP: NEGATIVE
LEUKOCYTE ESTERASE UR QL STRIP.AUTO: ABNORMAL
NITRITE UR QL STRIP: NEGATIVE
PH UR STRIP.AUTO: 5.5 [PH] (ref 5–8)
PROT UR QL STRIP: ABNORMAL
RBC # UR STRIP: ABNORMAL /HPF
REF LAB TEST METHOD: ABNORMAL
SP GR UR STRIP: 1.02 (ref 1–1.03)
SQUAMOUS #/AREA URNS HPF: ABNORMAL /HPF
UROBILINOGEN UR QL STRIP: ABNORMAL
WBC # UR STRIP: ABNORMAL /HPF

## 2025-04-17 PROCEDURE — 96374 THER/PROPH/DIAG INJ IV PUSH: CPT

## 2025-04-17 PROCEDURE — 81001 URINALYSIS AUTO W/SCOPE: CPT

## 2025-04-17 PROCEDURE — 99283 EMERGENCY DEPT VISIT LOW MDM: CPT

## 2025-04-17 PROCEDURE — 25010000002 ONDANSETRON PER 1 MG

## 2025-04-17 PROCEDURE — 25010000002 BUTORPHANOL PER 1 MG

## 2025-04-17 PROCEDURE — 25810000003 SODIUM CHLORIDE 0.9 % SOLUTION

## 2025-04-17 PROCEDURE — 96375 TX/PRO/DX INJ NEW DRUG ADDON: CPT

## 2025-04-17 PROCEDURE — 25010000002 HEPARIN LOCK FLUSH PER 10 UNITS

## 2025-04-17 RX ORDER — BUTORPHANOL TARTRATE 1 MG/ML
2 INJECTION, SOLUTION INTRAMUSCULAR; INTRAVENOUS ONCE
Status: COMPLETED | OUTPATIENT
Start: 2025-04-17 | End: 2025-04-17

## 2025-04-17 RX ORDER — ONDANSETRON 2 MG/ML
4 INJECTION INTRAMUSCULAR; INTRAVENOUS ONCE
Status: COMPLETED | OUTPATIENT
Start: 2025-04-17 | End: 2025-04-17

## 2025-04-17 RX ORDER — SODIUM CHLORIDE 0.9 % (FLUSH) 0.9 %
10 SYRINGE (ML) INJECTION AS NEEDED
Status: DISCONTINUED | OUTPATIENT
Start: 2025-04-17 | End: 2025-04-17 | Stop reason: HOSPADM

## 2025-04-17 RX ORDER — HEPARIN SODIUM (PORCINE) LOCK FLUSH IV SOLN 100 UNIT/ML 100 UNIT/ML
300 SOLUTION INTRAVENOUS ONCE
Status: COMPLETED | OUTPATIENT
Start: 2025-04-17 | End: 2025-04-17

## 2025-04-17 RX ADMIN — HEPARIN 300 UNITS: 100 SYRINGE at 20:47

## 2025-04-17 RX ADMIN — SODIUM CHLORIDE 1000 ML: 9 INJECTION, SOLUTION INTRAVENOUS at 19:35

## 2025-04-17 RX ADMIN — BUTORPHANOL TARTRATE 2 MG: 1 INJECTION, SOLUTION INTRAMUSCULAR; INTRAVENOUS at 19:34

## 2025-04-17 RX ADMIN — ONDANSETRON 4 MG: 2 INJECTION INTRAMUSCULAR; INTRAVENOUS at 19:36

## 2025-04-18 NOTE — ED PROVIDER NOTES
Subjective   History of Present Illness  50-year-old female presents to the ER chief complaint of hematuria.  This is an acute on chronic condition.  The patient is currently followed by urology Dr. Blunt.  Recent visit to his office in the last 2 weeks.  Known history of chronic migraines.        Review of Systems   Constitutional: Negative.  Negative for fever.   HENT: Negative.     Respiratory: Negative.     Cardiovascular: Negative.  Negative for chest pain.   Gastrointestinal: Negative.  Negative for abdominal pain.   Endocrine: Negative.    Genitourinary: Negative.  Positive for hematuria. Negative for dysuria.   Skin: Negative.    Neurological: Negative.  Positive for headaches.   Psychiatric/Behavioral: Negative.     All other systems reviewed and are negative.      Past Medical History:   Diagnosis Date    Hoyos's disease     Bipolar 1 disorder     Constipation     DDD (degenerative disc disease), cervical 05/29/2017    Fibromyalgia     IBS (irritable bowel syndrome)     Meningioma     Migraine     PONV (postoperative nausea and vomiting)     PTSD (post-traumatic stress disorder)     Rectal bleeding        Allergies   Allergen Reactions    Ativan [Lorazepam] Hallucinations     confusion    Sulfa Antibiotics Shortness Of Breath and Swelling    Sulfa Antibiotics Anaphylaxis    Reglan [Metoclopramide] Angioedema    Compazine [Prochlorperazine Edisylate] Hives    Demerol [Meperidine] Hives    Droperidol Itching    Metoclopramide Swelling    Toradol [Ketorolac Tromethamine] Hives and Itching    Toradol [Ketorolac Tromethamine] Hives    Zosyn [Piperacillin Sod-Tazobactam So] Hives       Past Surgical History:   Procedure Laterality Date    ANAL SCOPE N/A 7/28/2016    Procedure: ANAL SCOPE;  Surgeon: Kael Lopez MD;  Location: [facility] OR;  Service:     APPENDECTOMY      COLONOSCOPY N/A 6/30/2016    Procedure: COLONOSCOPY  CPTCODE:16516;  Surgeon: Jose Antonio Belle III, MD;  Location: [facility] OR;  Service:      COLONOSCOPY N/A 2016    Procedure: COLONOSCOPY (71233) CPT;  Surgeon: Jose Antonio Belle III, MD;  Location: [facility] OR;  Service:     CYSTOSCOPY RETROGRADE PYELOGRAM Bilateral 2017    Procedure: CYSTOSCOPY RETROGRADE PYELOGRAM;  Surgeon: Mu Blunt MD;  Location: [facility] OR;  Service:     ENDOSCOPY N/A 2016    Procedure: ESOPHAGOGASTRODUODENOSCOPY WITH BIOPSY  CPTCODE:92123;  Surgeon: Jose Antonio Belle III, MD;  Location: [facility] OR;  Service:     HEMORRHOIDECTOMY N/A 2016    Procedure: HEMORRHOID STAPLING;  Surgeon: Kael Lopez MD;  Location: [facility] OR;  Service:     HYSTERECTOMY      KNEE SURGERY      LAPAROSCOPIC SALPINGOOPHERECTOMY      PORTACATH PLACEMENT N/A 2017    Procedure: INSERTION OF PORTACATH;  Surgeon: Celso Arredondo MD;  Location: [facility] OR;  Service:     SHOULDER SURGERY      3 times       Family History   Problem Relation Age of Onset    Crohn's disease Other     Hypertension Other     Diabetes Other     Irritable bowel syndrome Other     No Known Problems Father     No Known Problems Mother        Social History     Socioeconomic History    Marital status:    Tobacco Use    Smoking status: Former     Current packs/day: 0.00     Average packs/day: 1 pack/day for 20.0 years (20.0 ttl pk-yrs)     Types: Cigarettes     Start date: 1995     Quit date: 2015     Years since quittin.4     Passive exposure: Past    Smokeless tobacco: Never   Vaping Use    Vaping status: Never Used   Substance and Sexual Activity    Alcohol use: No    Drug use: Yes     Types: Marijuana     Comment: for pain    Sexual activity: Defer     Birth control/protection: Surgical           Objective   Physical Exam  Vitals and nursing note reviewed.   Constitutional:       General: She is not in acute distress.     Appearance: She is well-developed. She is not diaphoretic.   HENT:      Head: Normocephalic and atraumatic.      Right Ear: External ear normal.       Left Ear: External ear normal.      Nose: Nose normal.   Eyes:      Conjunctiva/sclera: Conjunctivae normal.   Neck:      Vascular: No JVD.      Trachea: No tracheal deviation.   Cardiovascular:      Rate and Rhythm: Normal rate.      Heart sounds: No murmur heard.  Pulmonary:      Effort: Pulmonary effort is normal. No respiratory distress.      Breath sounds: No wheezing.   Abdominal:      Palpations: Abdomen is soft.      Tenderness: There is no abdominal tenderness.   Musculoskeletal:         General: No deformity. Normal range of motion.      Cervical back: Normal range of motion and neck supple.   Skin:     General: Skin is warm and dry.      Coloration: Skin is not pale.      Findings: No erythema or rash.   Neurological:      Mental Status: She is alert and oriented to person, place, and time.      Cranial Nerves: No cranial nerve deficit.   Psychiatric:         Behavior: Behavior normal.         Thought Content: Thought content normal.         Procedures           ED Course                                                       Medical Decision Making  Risk  Prescription drug management.        Final diagnoses:   Hematuria, unspecified type   Chronic migraine without aura without status migrainosus, not intractable       ED Disposition  ED Disposition       ED Disposition   Discharge    Condition   Stable    Comment   --               Mabel Patterson, APRN  [address] [address]  [phone]    Schedule an appointment as soon as possible for a visit            Medication List      No changes were made to your prescriptions during this visit.            Hudson Suarez II, PA  04/17/25 2038

## 2025-05-06 ENCOUNTER — OFFICE VISIT (OUTPATIENT)
Dept: UROLOGY | Facility: CLINIC | Age: 50
End: 2025-05-06
Payer: COMMERCIAL

## 2025-05-06 VITALS
DIASTOLIC BLOOD PRESSURE: 76 MMHG | SYSTOLIC BLOOD PRESSURE: 119 MMHG | WEIGHT: 113 LBS | HEIGHT: 68 IN | HEART RATE: 119 BPM | BODY MASS INDEX: 17.13 KG/M2

## 2025-05-06 DIAGNOSIS — F31.9 BIPOLAR 1 DISORDER: ICD-10-CM

## 2025-05-06 DIAGNOSIS — N35.028 OTHER POST-TRAUMATIC URETHRAL STRICTURE, FEMALE: Primary | ICD-10-CM

## 2025-05-06 DIAGNOSIS — N23 RENAL COLIC: ICD-10-CM

## 2025-05-06 RX ORDER — OXYCODONE AND ACETAMINOPHEN 10; 325 MG/1; MG/1
1 TABLET ORAL EVERY 6 HOURS PRN
Qty: 20 TABLET | Refills: 0 | Status: SHIPPED | OUTPATIENT
Start: 2025-05-06

## 2025-05-06 RX ORDER — PROMETHAZINE HYDROCHLORIDE 25 MG/1
25 TABLET ORAL EVERY 6 HOURS PRN
Qty: 28 TABLET | Refills: 4 | Status: SHIPPED | OUTPATIENT
Start: 2025-05-06

## 2025-05-06 RX ORDER — MEGESTROL ACETATE 20 MG/1
20 TABLET ORAL DAILY
Qty: 30 TABLET | Refills: 3 | Status: SHIPPED | OUTPATIENT
Start: 2025-05-06

## 2025-05-06 RX ORDER — GENTAMICIN 40 MG/ML
80 INJECTION, SOLUTION INTRAMUSCULAR; INTRAVENOUS ONCE
Status: COMPLETED | OUTPATIENT
Start: 2025-05-06 | End: 2025-05-06

## 2025-05-06 RX ADMIN — GENTAMICIN 80 MG: 40 INJECTION, SOLUTION INTRAMUSCULAR; INTRAVENOUS at 14:00

## 2025-05-06 NOTE — PROGRESS NOTES
Chief Complaint:      Chief Complaint   Patient presents with    Uretheral Dilation        HPI:   50 y.o. female with severe posttraumatic urethral stricture for dilation    Past Medical History:     Past Medical History:   Diagnosis Date    Hoyos's disease     Bipolar 1 disorder     Constipation     DDD (degenerative disc disease), cervical 05/29/2017    Fibromyalgia     IBS (irritable bowel syndrome)     Meningioma     Migraine     PONV (postoperative nausea and vomiting)     PTSD (post-traumatic stress disorder)     Rectal bleeding        Current Meds:     Current Outpatient Medications   Medication Sig Dispense Refill    albuterol (PROVENTIL HFA;VENTOLIN HFA) 108 (90 Base) MCG/ACT inhaler Inhale 2 puffs 2 (Two) Times a Day.      Azelastine HCl 137 MCG/SPRAY solution Administer 1 spray into the nostril(s) as directed by provider As Needed (Allergies).      busPIRone (BUSPAR) 15 MG tablet Take 1 tablet by mouth 3 (Three) Times a Day.      diclofenac (VOLTAREN) 1 % gel gel       divalproex (DEPAKOTE) 500 MG DR tablet Take 1 tablet by mouth 3 (Three) Times a Day. 90 tablet 2    docusate sodium (COLACE) 100 MG capsule Take 1 capsule by mouth 2 (Two) Times a Day. 20 capsule 0    fluticasone (VERAMYST) 27.5 MCG/SPRAY nasal spray Administer 2 sprays into the nostril(s) as directed by provider Daily.      megestrol (MEGACE) 20 MG tablet Take 1 tablet by mouth Daily. 30 tablet 3    methylPREDNISolone (MEDROL) 4 MG dose pack Take as directed on package instructions. 21 tablet 0    metroNIDAZOLE (FLAGYL) 500 MG tablet Take 1 tablet by mouth 3 (Three) Times a Day. 30 tablet 0    montelukast (SINGULAIR) 10 MG tablet Take 1 tablet by mouth Every Night.      naloxone (NARCAN) 4 MG/0.1ML nasal spray Call 911. Don't prime. Randolph in 1 nostril for overdose. Repeat in 2-3 minutes in other nostril if no or minimal breathing/responsiveness. 2 each 0    nitrofurantoin, macrocrystal-monohydrate, (MACROBID) 100 MG capsule Take 1 capsule  by mouth 2 (Two) Times a Day. 13 capsule 0    ondansetron ODT (ZOFRAN-ODT) 4 MG disintegrating tablet Place 2 tablets on the tongue Every 8 (Eight) Hours As Needed for Nausea or Vomiting. 30 tablet 0    oxyCODONE-acetaminophen (Percocet)  MG per tablet Take 1 tablet by mouth Every 6 (Six) Hours As Needed for Moderate Pain. 20 tablet 0    pantoprazole (PROTONIX) 40 MG EC tablet Take 1 tablet by mouth 2 (Two) Times a Day As Needed.      phenazopyridine (PYRIDIUM) 100 MG tablet Take 1 tablet by mouth 3 (Three) Times a Day As Needed for Bladder Spasms. 30 tablet 1    phenazopyridine (Pyridium) 200 MG tablet Take 1 tablet by mouth 3 (Three) Times a Day As Needed for Bladder Spasms. 20 tablet 0    polyethylene glycol (MIRALAX) 17 GM/SCOOP powder Take 17 g by mouth Daily. 225 g 0    potassium chloride (KLOR-CON M20) 20 MEQ CR tablet Take 1 tablet by mouth Daily. 10 tablet 0    promethazine (PHENERGAN) 25 MG tablet Take 1 tablet by mouth Every 6 (Six) Hours As Needed for Nausea or Vomiting. 28 tablet 4    promethazine (PHENERGAN) 25 MG tablet Take 1 tablet by mouth Every 6 (Six) Hours As Needed for Nausea or Vomiting. 21 tablet 5    sertraline (ZOLOFT) 100 MG tablet Take 1 tablet by mouth 2 (Two) Times a Day.      SUMAtriptan (IMITREX) 6 MG/0.5ML injection Inject prescribed dose at onset of headache. May repeat dose one time in 1 hour(s) if headache not relieved.      traMADol (ULTRAM) 50 MG tablet Take 1 tablet by mouth Every 8 (Eight) Hours As Needed for Moderate Pain. 12 tablet 0    terazosin (HYTRIN) 2 MG capsule Take 1 capsule by mouth Every Night for 14 days. 14 capsule 0     No current facility-administered medications for this visit.        Allergies:      Allergies   Allergen Reactions    Ativan [Lorazepam] Hallucinations     confusion    Sulfa Antibiotics Shortness Of Breath and Swelling    Sulfa Antibiotics Anaphylaxis    Reglan [Metoclopramide] Angioedema    Compazine [Prochlorperazine Edisylate] Hives     Demerol [Meperidine] Hives    Droperidol Itching    Metoclopramide Swelling    Toradol [Ketorolac Tromethamine] Hives and Itching    Toradol [Ketorolac Tromethamine] Hives    Zosyn [Piperacillin Sod-Tazobactam So] Hives        Past Surgical History:     Past Surgical History:   Procedure Laterality Date    ANAL SCOPE N/A 2016    Procedure: ANAL SCOPE;  Surgeon: Kael Lopez MD;  Location: [facility] OR;  Service:     APPENDECTOMY      COLONOSCOPY N/A 2016    Procedure: COLONOSCOPY  CPTCODE:69905;  Surgeon: Jose Antonio Belle III, MD;  Location: [facility] OR;  Service:     COLONOSCOPY N/A 2016    Procedure: COLONOSCOPY (26447) CPT;  Surgeon: Jose Antonio Belle III, MD;  Location: [facility] OR;  Service:     CYSTOSCOPY RETROGRADE PYELOGRAM Bilateral 2017    Procedure: CYSTOSCOPY RETROGRADE PYELOGRAM;  Surgeon: Mu Blunt MD;  Location: [facility] OR;  Service:     ENDOSCOPY N/A 2016    Procedure: ESOPHAGOGASTRODUODENOSCOPY WITH BIOPSY  CPTCODE:44675;  Surgeon: Jose Antonio Belle III, MD;  Location: [facility] OR;  Service:     HEMORRHOIDECTOMY N/A 2016    Procedure: HEMORRHOID STAPLING;  Surgeon: Kael Lopez MD;  Location: [facility] OR;  Service:     HYSTERECTOMY      KNEE SURGERY      LAPAROSCOPIC SALPINGOOPHERECTOMY      PORTACATH PLACEMENT N/A 2017    Procedure: INSERTION OF PORTACATH;  Surgeon: Celso Arredondo MD;  Location: [facility] OR;  Service:     SHOULDER SURGERY      3 times       Social History:     Social History     Socioeconomic History    Marital status:    Tobacco Use    Smoking status: Former     Current packs/day: 0.00     Average packs/day: 1 pack/day for 20.0 years (20.0 ttl pk-yrs)     Types: Cigarettes     Start date: 1995     Quit date: 2015     Years since quittin.5     Passive exposure: Past    Smokeless tobacco: Never   Vaping Use    Vaping status: Never Used   Substance and Sexual Activity    Alcohol use: No    Drug use: Yes      Types: Marijuana     Comment: for pain    Sexual activity: Defer     Birth control/protection: Surgical       Family History:     Family History   Problem Relation Age of Onset    Crohn's disease Other     Hypertension Other     Diabetes Other     Irritable bowel syndrome Other     No Known Problems Father     No Known Problems Mother        Review of Systems:     Review of Systems   Constitutional: Negative.  Negative for activity change, appetite change, chills, diaphoresis, fatigue and unexpected weight change.   HENT:  Negative for congestion, dental problem, drooling, ear discharge, ear pain, facial swelling, hearing loss, mouth sores, nosebleeds, postnasal drip, rhinorrhea, sinus pressure, sneezing, sore throat, tinnitus, trouble swallowing and voice change.    Eyes: Negative.  Negative for photophobia, pain, discharge, redness, itching and visual disturbance.   Respiratory: Negative.  Negative for apnea, cough, choking, chest tightness, shortness of breath, wheezing and stridor.    Cardiovascular: Negative.  Negative for chest pain, palpitations and leg swelling.   Gastrointestinal: Negative.  Negative for abdominal distention, abdominal pain, anal bleeding, blood in stool, constipation, diarrhea, nausea, rectal pain and vomiting.   Endocrine: Negative.  Negative for cold intolerance, heat intolerance, polydipsia, polyphagia and polyuria.   Genitourinary:  Positive for difficulty urinating.   Musculoskeletal: Negative.  Negative for arthralgias, back pain, gait problem, joint swelling, myalgias, neck pain and neck stiffness.   Skin: Negative.  Negative for color change, pallor, rash and wound.   Allergic/Immunologic: Negative.  Negative for environmental allergies, food allergies and immunocompromised state.   Neurological: Negative.  Negative for dizziness, tremors, seizures, syncope, facial asymmetry, speech difficulty, weakness, light-headedness, numbness and headaches.   Hematological: Negative.  Negative  for adenopathy. Does not bruise/bleed easily.   Psychiatric/Behavioral:  Negative for agitation, behavioral problems, confusion, decreased concentration, dysphoric mood, hallucinations, self-injury, sleep disturbance and suicidal ideas. The patient is not nervous/anxious and is not hyperactive.    All other systems reviewed and are negative.      Physical Exam:     Physical Exam  Constitutional:       Appearance: She is well-developed.   HENT:      Head: Normocephalic and atraumatic.      Right Ear: External ear normal.      Left Ear: External ear normal.   Eyes:      Conjunctiva/sclera: Conjunctivae normal.      Pupils: Pupils are equal, round, and reactive to light.   Cardiovascular:      Rate and Rhythm: Normal rate and regular rhythm.      Heart sounds: Normal heart sounds.   Pulmonary:      Effort: Pulmonary effort is normal.      Breath sounds: Normal breath sounds.   Abdominal:      General: Bowel sounds are normal. There is no distension.      Palpations: Abdomen is soft. There is no mass.      Tenderness: There is no abdominal tenderness. There is no guarding or rebound.   Genitourinary:     General: Normal vulva.      Vagina: No vaginal discharge.   Musculoskeletal:         General: Normal range of motion.   Skin:     General: Skin is warm and dry.   Neurological:      Mental Status: She is alert.      Deep Tendon Reflexes: Reflexes are normal and symmetric.   Psychiatric:         Behavior: Behavior normal.         Thought Content: Thought content normal.         Judgment: Judgment normal.         I have reviewed the following portions of the patient's history: Allergies, current medications, past family history, past medical history, past social history, past surgical history, problem list, and ROS and confirm it is accurate.    Recent Image (CT and/or KUB):      CT Abdomen and Pelvis: No results found for this or any previous visit.       CT Stone Protocol: Results for orders placed during the hospital  encounter of 10/10/23    CT Abdomen Pelvis Stone Protocol    Narrative  CT ABDOMEN PELVIS STONE PROTOCOL-: 10/10/2023 9:19 PM    REASON FOR EXAM: Flank pain, kidney stone suspected LT FLANK PAIN.    TECHNIQUE: Noncontrast 4 mm axial sections with sagittal and coronal  reformats.    COMPARISON: 3/16/2023.    FINDINGS: There is focal bronchiectasis within the right lower lobe  (image 4/59), now with probable mucous plugging.  Noting the noncontrast  technique, the liver is unremarkable.  No focal liver lesions are  identified.  Best demonstrated on narrow windows, there is the  suggestion of cholelithiasis dependently within the gallbladder.  There  is no biliary ductal dilatation.  The spleen is at the upper limits of  normal in size.  The pancreas, adrenal glands and kidneys are within  normal limits, noting a probable cyst within the lower pole of the right  kidney (image 3/37).  There is no nephrolithiasis.  There is no abnormal  dilatation of either renal collecting system or ureter.  There is no  ureterolithiasis.  The abdominal aorta is normal in caliber, noting mild  vascular calcification.  There is a surgical anastomosis at the  rectoanal junction.  No complication is evident.  There are no findings  of bowel obstruction.  Noting the lack of enteric contrast material,  there is no abnormal bowel wall thickening.  The appendix is not  identified, however there are no findings of appendicitis.  There is no  free intraperitoneal fluid.  The uterus is absent.  The ovaries are not  identified.  There are no abnormal adnexal masses.  There is no lymph  node enlargement.  The bladder is unremarkable.  There is an old  fracture of L2.    Impression  1.  No acute abnormality of the abdomen or pelvis.  There is no  nephrolithiasis or ureterolithiasis.  2.  Incidental findings, as above, similar in appearance to 3/16/2023.      This report was finalized on 10/10/2023 11:05 PM by Steph Fabian MD.       KUB: Results  for orders placed during the hospital encounter of 01/10/23    XR Abdomen KUB    Narrative  EXAM:  XR Abdomen, 1 View    EXAM DATE:  1/10/2023 12:05 PM    CLINICAL HISTORY:  flank pain    TECHNIQUE:  Frontal supine view of the abdomen/pelvis.    COMPARISON:  10/01/2022    FINDINGS:  Gastrointestinal tract:  Unremarkable.  No dilation.  Organs:  No stones identified along the expected course of ureters.  Bones/joints:  Unremarkable.  Soft tissues:  Surgical staples in the lower pelvis.  Vasculature:  Stable phleboliths lower pelvis.    Impression  1.  No acute findings radiographically evident.  2.  No radiographic evidence of renal or ureteral stones.    This report was finalized on 1/10/2023 12:47 PM by Dr. Ankit Naik MD.       Labs (past 3 months):      Admission on 04/17/2025, Discharged on 04/17/2025   Component Date Value Ref Range Status    Color, UA 04/17/2025 Yellow  Yellow, Straw Final    Appearance, UA 04/17/2025 Clear  Clear Final    pH, UA 04/17/2025 5.5  5.0 - 8.0 Final    Specific Gravity, UA 04/17/2025 1.022  1.005 - 1.030 Final    Glucose, UA 04/17/2025 Negative  Negative Final    Ketones, UA 04/17/2025 Negative  Negative Final    Bilirubin, UA 04/17/2025 Negative  Negative Final    Blood, UA 04/17/2025 Moderate (2+) (A)  Negative Final    Protein, UA 04/17/2025 Trace (A)  Negative Final    Leuk Esterase, UA 04/17/2025 Trace (A)  Negative Final    Nitrite, UA 04/17/2025 Negative  Negative Final    Urobilinogen, UA 04/17/2025 1.0 E.U./dL  0.2 - 1.0 E.U./dL Final    RBC, UA 04/17/2025 Too Numerous to Count (A)  None Seen, 0-2 /HPF Final    WBC, UA 04/17/2025 3-5 (A)  None Seen, 0-2 /HPF Final    Urine culture not indicated.    Bacteria, UA 04/17/2025 None Seen  None Seen /HPF Final    Squamous Epithelial Cells, UA 04/17/2025 0-2  None Seen, 0-2 /HPF Final    Hyaline Casts, UA 04/17/2025 None Seen  None Seen /LPF Final    Methodology 04/17/2025 Automated Microscopy   Final        Procedure:      Urethral dilation-after an appropriate informed consent, the patient was brought to the procedure suite.  The urethra was gently anesthetized with 10 mL of 2% viscous Xylocaine jelly.  After an adequate period of topical anesthesia I went ahead and  the urethra was gently anesthetized and dilated with Briscoe sounds from 16 to 26 Portuguese sequentially without complication. The patient was given gentamicin as prophylaxis with 80 mg.  Assessment/Plan:   Posttraumatic urethral stricture-as post dilation  Narcotic pain medication-patient has significant acute pain that I believe would be an indication for the use of narcotic pain medication.  I discussed the significant risks of pain medication and the fact that this will be a short only option and I will give her no more than a three-day supply of pain medication, I will not plan long-term medication, and that this will be sent to a pain clinic if it at all becomes necessary.  We discussed signing a pain medication agreement and the fact that we're going to run a state [facility] review to be sure the patient is not getting pain medication from elsewhere.  If this is the case, we will not give pain medication as part of the patient's treatment plan of there being prescribed a controlled substance with potential for abuse.  This patient has been well aware of the appropriate dose of such medications including the risks for somnolence, limited ability to drive and/or safety and the significant potential for overdose.  It has been made clear that these medications are for the prescribed patient only without concomitant use of alcohol or other substance unless prescribed by the medical provider.  Has completed prescribing agreement detailing the terms of continue prescribing him a controlled substance including monitoring Luis Alberto reports, the possibility of urine drug screens, and pill counts.  The patient is aware that we review LUIS ALBERTO reports on a regular basis and scan  them into the chart.  History and physical examination exhibited continued safe and appropriate use of controlled substances. We also discussed the fact that the new [place] legislation allows only a three-day prescription for pain medication.  In this situation he will be referred to a chronic pain clinic.  Nausea and vomiting-patient has significant nausea and vomiting as a consequence of this.  We recommended Phenergan.  We also discussed the use of Zofran and the sedating side effects of Phenergan as well.            This document has been electronically signed by VERENICE FINK MD May 6, 2025 13:23 EDT    Dictated Utilizing Dragon Dictation: Part of this note may be an electronic transcription/translation of spoken language to printed text using the Dragon Dictation System.

## 2025-05-12 ENCOUNTER — TELEPHONE (OUTPATIENT)
Dept: UROLOGY | Facility: CLINIC | Age: 50
End: 2025-05-12
Payer: COMMERCIAL

## 2025-05-12 NOTE — TELEPHONE ENCOUNTER
Hub staff attempted to follow warm transfer process and was unsuccessful     Caller: Susanna Camilo    Relationship to patient: Self    Best call back number:   Telephone Information:   Mobile [phone]         Patient is needing: PT HAD ANTIBIOTIC INJECTION FOLLOWING DIALATION LAST TUESDAY.  ON THE RIGHT BUTT CHEEK SHE NOW HAS SWELLING,AND PAIN.    SHE ALSO STATED THAT WHEN SHE PUTS PRESSURE ON THE LEG SHE FEELS SHOOTING PAIN IN HER BUTTOCKS THAT TRAVELS DOWN HER THIGH.  SHE STATED MOST OF THE PAIN IS CENTRALIZED IN HER BUTTOCK. SHE IS ALSO NOT ABLE TO PUT PRESSURE ON THE AREA AS IN SITTING WITHOUT DISCOMFORT.  SHE WANTED TO FIND OUT WHAT SIDE SHE HAD THE INJECTION ON AND IF THIS IS POSSIBLY RELATED TO THE SHOT OR IF THERE IS SOMETHING ELSE GOING ON  PLEASE CALL PT ASAP TO ADVISE.  THANK YOU.

## 2025-05-12 NOTE — TELEPHONE ENCOUNTER
Routing to Doc to see if he thinks these are related. Since he gave the injection to the pt/ We give ventrolateral injections so I am not sure this would be a factor?    I tried to call the pt back and let her know that doc said no there was no way this was related he gave the injection in the fatty tissue and she could come in and we could check on it. I could not reach her or leave a vm and the pt did call back and state she thought it was actually a spider bite.

## 2025-05-12 NOTE — TELEPHONE ENCOUNTER
PT CALLED BACK AND SAID SHE BELIEVES FRANKI SHE IS REFERRING TO IS A SPIDER BITE, DISREGARD MESSAGE

## 2025-05-15 ENCOUNTER — APPOINTMENT (OUTPATIENT)
Dept: CT IMAGING | Facility: HOSPITAL | Age: 50
End: 2025-05-15
Payer: COMMERCIAL

## 2025-05-15 ENCOUNTER — APPOINTMENT (OUTPATIENT)
Dept: GENERAL RADIOLOGY | Facility: HOSPITAL | Age: 50
End: 2025-05-15
Payer: COMMERCIAL

## 2025-05-15 ENCOUNTER — HOSPITAL ENCOUNTER (EMERGENCY)
Facility: HOSPITAL | Age: 50
Discharge: HOME | End: 2025-05-15
Payer: COMMERCIAL

## 2025-05-15 VITALS
OXYGEN SATURATION: 98 % | RESPIRATION RATE: 16 BRPM | WEIGHT: 113 LBS | HEART RATE: 76 BPM | SYSTOLIC BLOOD PRESSURE: 127 MMHG | TEMPERATURE: 98 F | BODY MASS INDEX: 17.13 KG/M2 | DIASTOLIC BLOOD PRESSURE: 91 MMHG | HEIGHT: 68 IN

## 2025-05-15 DIAGNOSIS — R07.89 CHEST WALL PAIN: Primary | ICD-10-CM

## 2025-05-15 DIAGNOSIS — S31.819A WOUND OF RIGHT BUTTOCK, INITIAL ENCOUNTER: ICD-10-CM

## 2025-05-15 LAB
ALBUMIN SERPL-MCNC: 3.9 G/DL (ref 3.5–5.2)
ALBUMIN/GLOB SERPL: 1.4 G/DL
ALP SERPL-CCNC: 85 U/L (ref 39–117)
ALT SERPL W P-5'-P-CCNC: 25 U/L (ref 1–33)
ANION GAP SERPL CALCULATED.3IONS-SCNC: 8.3 MMOL/L (ref 5–15)
APTT PPP: 83.2 SECONDS (ref 24.5–35.9)
AST SERPL-CCNC: 27 U/L (ref 1–32)
BASOPHILS # BLD AUTO: 0.02 10*3/MM3 (ref 0–0.2)
BASOPHILS NFR BLD AUTO: 0.3 % (ref 0–1.5)
BILIRUB SERPL-MCNC: 0.5 MG/DL (ref 0–1.2)
BUN SERPL-MCNC: 6 MG/DL (ref 6–20)
BUN/CREAT SERPL: 10.9 (ref 7–25)
CALCIUM SPEC-SCNC: 8.7 MG/DL (ref 8.6–10.5)
CHLORIDE SERPL-SCNC: 104 MMOL/L (ref 98–107)
CK SERPL-CCNC: 34 U/L (ref 20–180)
CO2 SERPL-SCNC: 27.7 MMOL/L (ref 22–29)
CREAT SERPL-MCNC: 0.55 MG/DL (ref 0.57–1)
D DIMER PPP FEU-MCNC: 0.63 MCGFEU/ML (ref 0–0.5)
DEPRECATED RDW RBC AUTO: 44.4 FL (ref 37–54)
EGFRCR SERPLBLD CKD-EPI 2021: 111.8 ML/MIN/1.73
EOSINOPHIL # BLD AUTO: 0.08 10*3/MM3 (ref 0–0.4)
EOSINOPHIL NFR BLD AUTO: 1.2 % (ref 0.3–6.2)
ERYTHROCYTE [DISTWIDTH] IN BLOOD BY AUTOMATED COUNT: 13.2 % (ref 12.3–15.4)
GEN 5 1HR TROPONIN T REFLEX: <6 NG/L
GLOBULIN UR ELPH-MCNC: 2.7 GM/DL
GLUCOSE SERPL-MCNC: 131 MG/DL (ref 65–99)
HCT VFR BLD AUTO: 42.6 % (ref 34–46.6)
HGB BLD-MCNC: 14.2 G/DL (ref 12–15.9)
HOLD SPECIMEN: NORMAL
HOLD SPECIMEN: NORMAL
IMM GRANULOCYTES # BLD AUTO: 0.05 10*3/MM3 (ref 0–0.05)
IMM GRANULOCYTES NFR BLD AUTO: 0.8 % (ref 0–0.5)
INR PPP: 1.03 (ref 0.9–1.1)
LYMPHOCYTES # BLD AUTO: 1.35 10*3/MM3 (ref 0.7–3.1)
LYMPHOCYTES NFR BLD AUTO: 20.7 % (ref 19.6–45.3)
MAGNESIUM SERPL-MCNC: 1.8 MG/DL (ref 1.6–2.6)
MCH RBC QN AUTO: 30.8 PG (ref 26.6–33)
MCHC RBC AUTO-ENTMCNC: 33.3 G/DL (ref 31.5–35.7)
MCV RBC AUTO: 92.4 FL (ref 79–97)
MONOCYTES # BLD AUTO: 0.35 10*3/MM3 (ref 0.1–0.9)
MONOCYTES NFR BLD AUTO: 5.4 % (ref 5–12)
NEUTROPHILS NFR BLD AUTO: 4.68 10*3/MM3 (ref 1.7–7)
NEUTROPHILS NFR BLD AUTO: 71.6 % (ref 42.7–76)
NRBC BLD AUTO-RTO: 0 /100 WBC (ref 0–0.2)
PLATELET # BLD AUTO: 148 10*3/MM3 (ref 140–450)
PMV BLD AUTO: 10.7 FL (ref 6–12)
POTASSIUM SERPL-SCNC: 3.9 MMOL/L (ref 3.5–5.2)
PROT SERPL-MCNC: 6.6 G/DL (ref 6–8.5)
PROTHROMBIN TIME: 13.6 SECONDS (ref 11.6–15.1)
RBC # BLD AUTO: 4.61 10*6/MM3 (ref 3.77–5.28)
SODIUM SERPL-SCNC: 140 MMOL/L (ref 136–145)
TROPONIN T NUMERIC DELTA: NORMAL
TROPONIN T SERPL HS-MCNC: 7 NG/L
VALPROATE SERPL-MCNC: <2.8 MCG/ML (ref 50–125)
WBC NRBC COR # BLD AUTO: 6.53 10*3/MM3 (ref 3.4–10.8)
WHOLE BLOOD HOLD COAG: NORMAL
WHOLE BLOOD HOLD SPECIMEN: NORMAL

## 2025-05-15 PROCEDURE — 85610 PROTHROMBIN TIME: CPT | Performed by: PHYSICIAN ASSISTANT

## 2025-05-15 PROCEDURE — 80164 ASSAY DIPROPYLACETIC ACD TOT: CPT | Performed by: PHYSICIAN ASSISTANT

## 2025-05-15 PROCEDURE — 25010000002 HYDROMORPHONE PER 4 MG

## 2025-05-15 PROCEDURE — 25010000002 ONDANSETRON PER 1 MG: Performed by: PHYSICIAN ASSISTANT

## 2025-05-15 PROCEDURE — 83735 ASSAY OF MAGNESIUM: CPT | Performed by: PHYSICIAN ASSISTANT

## 2025-05-15 PROCEDURE — 85379 FIBRIN DEGRADATION QUANT: CPT | Performed by: PHYSICIAN ASSISTANT

## 2025-05-15 PROCEDURE — 82550 ASSAY OF CK (CPK): CPT | Performed by: PHYSICIAN ASSISTANT

## 2025-05-15 PROCEDURE — 93005 ELECTROCARDIOGRAM TRACING: CPT

## 2025-05-15 PROCEDURE — 25010000002 HEPARIN LOCK FLUSH PER 10 UNITS: Performed by: PHYSICIAN ASSISTANT

## 2025-05-15 PROCEDURE — 99285 EMERGENCY DEPT VISIT HI MDM: CPT

## 2025-05-15 PROCEDURE — 25010000002 HYDROMORPHONE 1 MG/ML SOLUTION

## 2025-05-15 PROCEDURE — 71275 CT ANGIOGRAPHY CHEST: CPT

## 2025-05-15 PROCEDURE — 85730 THROMBOPLASTIN TIME PARTIAL: CPT | Performed by: PHYSICIAN ASSISTANT

## 2025-05-15 PROCEDURE — 96374 THER/PROPH/DIAG INJ IV PUSH: CPT

## 2025-05-15 PROCEDURE — 71275 CT ANGIOGRAPHY CHEST: CPT | Performed by: RADIOLOGY

## 2025-05-15 PROCEDURE — 96375 TX/PRO/DX INJ NEW DRUG ADDON: CPT

## 2025-05-15 PROCEDURE — 71045 X-RAY EXAM CHEST 1 VIEW: CPT

## 2025-05-15 PROCEDURE — 71045 X-RAY EXAM CHEST 1 VIEW: CPT | Performed by: RADIOLOGY

## 2025-05-15 PROCEDURE — 85025 COMPLETE CBC W/AUTO DIFF WBC: CPT

## 2025-05-15 PROCEDURE — 36415 COLL VENOUS BLD VENIPUNCTURE: CPT

## 2025-05-15 PROCEDURE — 80053 COMPREHEN METABOLIC PANEL: CPT

## 2025-05-15 PROCEDURE — 84484 ASSAY OF TROPONIN QUANT: CPT

## 2025-05-15 PROCEDURE — 96376 TX/PRO/DX INJ SAME DRUG ADON: CPT

## 2025-05-15 PROCEDURE — 25510000001 IOPAMIDOL PER 1 ML

## 2025-05-15 RX ORDER — HYDROMORPHONE HYDROCHLORIDE 1 MG/ML
0.5 INJECTION, SOLUTION INTRAMUSCULAR; INTRAVENOUS; SUBCUTANEOUS ONCE
Refills: 0 | Status: COMPLETED | OUTPATIENT
Start: 2025-05-15 | End: 2025-05-15

## 2025-05-15 RX ORDER — SODIUM CHLORIDE 0.9 % (FLUSH) 0.9 %
10 SYRINGE (ML) INJECTION AS NEEDED
Status: DISCONTINUED | OUTPATIENT
Start: 2025-05-15 | End: 2025-05-15 | Stop reason: HOSPADM

## 2025-05-15 RX ORDER — GINSENG 100 MG
1 CAPSULE ORAL 2 TIMES DAILY
Qty: 14 G | Refills: 0 | Status: SHIPPED | OUTPATIENT
Start: 2025-05-15

## 2025-05-15 RX ORDER — HEPARIN SODIUM (PORCINE) LOCK FLUSH IV SOLN 100 UNIT/ML 100 UNIT/ML
300 SOLUTION INTRAVENOUS ONCE
Status: COMPLETED | OUTPATIENT
Start: 2025-05-15 | End: 2025-05-15

## 2025-05-15 RX ORDER — ONDANSETRON 2 MG/ML
4 INJECTION INTRAMUSCULAR; INTRAVENOUS ONCE
Status: COMPLETED | OUTPATIENT
Start: 2025-05-15 | End: 2025-05-15

## 2025-05-15 RX ORDER — IOPAMIDOL 755 MG/ML
100 INJECTION, SOLUTION INTRAVASCULAR
Status: COMPLETED | OUTPATIENT
Start: 2025-05-15 | End: 2025-05-15

## 2025-05-15 RX ORDER — ASPIRIN 81 MG/1
324 TABLET, CHEWABLE ORAL ONCE
Status: COMPLETED | OUTPATIENT
Start: 2025-05-15 | End: 2025-05-15

## 2025-05-15 RX ADMIN — IOPAMIDOL 60 ML: 755 INJECTION, SOLUTION INTRAVENOUS at 10:21

## 2025-05-15 RX ADMIN — HEPARIN 300 UNITS: 100 SYRINGE at 11:20

## 2025-05-15 RX ADMIN — ASPIRIN 324 MG: 81 TABLET, CHEWABLE ORAL at 09:02

## 2025-05-15 RX ADMIN — ONDANSETRON 4 MG: 2 INJECTION INTRAMUSCULAR; INTRAVENOUS at 09:03

## 2025-05-15 RX ADMIN — HYDROMORPHONE HYDROCHLORIDE 0.5 MG: 1 INJECTION, SOLUTION INTRAMUSCULAR; INTRAVENOUS; SUBCUTANEOUS at 10:26

## 2025-05-15 RX ADMIN — HYDROMORPHONE HYDROCHLORIDE 0.5 MG: 1 INJECTION, SOLUTION INTRAMUSCULAR; INTRAVENOUS; SUBCUTANEOUS at 09:02

## 2025-05-15 NOTE — ED PROVIDER NOTES
Subjective   History of Present Illness  50-year-old female who presents to the ED today with chest pain.  She states she woke up around 1 or 2 AM with pain to the right side of her chest.  She states it hurts to take a deep breath and it hurts to move.  She denies any recent falls or injuries.  She denies any cough or shortness of breath.  She did not try any medication for her symptoms prior to arrival.  The patient also states that she has a small wound on her right buttock.  She states that she recently had dilation of a urethral stricture.  She states she received a gentamicin injection afterwards and she states the wound is in that area.    History provided by:  Patient  Chest Pain  Pain location:  R chest  Pain quality: sharp    Pain radiates to:  Does not radiate  Pain severity:  Moderate  Onset quality:  Sudden  Duration:  7 hours  Timing:  Constant  Progression:  Unchanged  Chronicity:  New  Context: breathing and movement    Relieved by:  Nothing  Worsened by:  Certain positions, deep breathing and movement  Associated symptoms: no abdominal pain, no altered mental status, no anorexia, no anxiety, no back pain, no cough, no diaphoresis, no dizziness, no dysphagia, no fatigue, no fever, no headache, no heartburn, no lower extremity edema, no nausea, no near-syncope, no palpitations, no shortness of breath, no syncope, no vomiting and no weakness        Review of Systems   Constitutional:  Negative for diaphoresis, fatigue and fever.   HENT:  Negative for trouble swallowing.    Respiratory:  Negative for cough and shortness of breath.    Cardiovascular:  Positive for chest pain. Negative for palpitations, syncope and near-syncope.   Gastrointestinal:  Negative for abdominal pain, anorexia, heartburn, nausea and vomiting.   Genitourinary: Negative.    Musculoskeletal:  Negative for back pain.   Skin: Negative.    Neurological:  Negative for dizziness, weakness and headaches.   Psychiatric/Behavioral:  Negative.     All other systems reviewed and are negative.      Past Medical History:   Diagnosis Date    Hoyos's disease     Bipolar 1 disorder     Constipation     DDD (degenerative disc disease), cervical 05/29/2017    Fibromyalgia     IBS (irritable bowel syndrome)     Meningioma     Migraine     PONV (postoperative nausea and vomiting)     PTSD (post-traumatic stress disorder)     Rectal bleeding        Allergies   Allergen Reactions    Ativan [Lorazepam] Hallucinations     confusion    Sulfa Antibiotics Shortness Of Breath and Swelling    Sulfa Antibiotics Anaphylaxis    Reglan [Metoclopramide] Angioedema    Compazine [Prochlorperazine Edisylate] Hives    Demerol [Meperidine] Hives    Droperidol Itching    Metoclopramide Swelling    Toradol [Ketorolac Tromethamine] Hives and Itching    Toradol [Ketorolac Tromethamine] Hives    Zosyn [Piperacillin Sod-Tazobactam So] Hives       Past Surgical History:   Procedure Laterality Date    ANAL SCOPE N/A 7/28/2016    Procedure: ANAL SCOPE;  Surgeon: Kael Lopez MD;  Location: [facility] OR;  Service:     APPENDECTOMY      COLONOSCOPY N/A 6/30/2016    Procedure: COLONOSCOPY  CPTCODE:93591;  Surgeon: Jose Antonio Belle III, MD;  Location: [facility] OR;  Service:     COLONOSCOPY N/A 7/7/2016    Procedure: COLONOSCOPY (94136) CPT;  Surgeon: Jose Antonio Belle III, MD;  Location: [facility] OR;  Service:     CYSTOSCOPY RETROGRADE PYELOGRAM Bilateral 4/28/2017    Procedure: CYSTOSCOPY RETROGRADE PYELOGRAM;  Surgeon: Mu Blunt MD;  Location: [facility] OR;  Service:     ENDOSCOPY N/A 6/30/2016    Procedure: ESOPHAGOGASTRODUODENOSCOPY WITH BIOPSY  CPTCODE:53658;  Surgeon: Jose Antonio Belle III, MD;  Location: [facility] OR;  Service:     HEMORRHOIDECTOMY N/A 7/28/2016    Procedure: HEMORRHOID STAPLING;  Surgeon: Kael Lopez MD;  Location: [facility] OR;  Service:     HYSTERECTOMY      KNEE SURGERY      LAPAROSCOPIC SALPINGOOPHERECTOMY      PORTACATH PLACEMENT N/A 7/28/2017     Procedure: INSERTION OF PORTACATH;  Surgeon: Celso Arredondo MD;  Location: [facility];  Service:     SHOULDER SURGERY      3 times       Family History   Problem Relation Age of Onset    Crohn's disease Other     Hypertension Other     Diabetes Other     Irritable bowel syndrome Other     No Known Problems Father     No Known Problems Mother        Social History     Socioeconomic History    Marital status:    Tobacco Use    Smoking status: Former     Current packs/day: 0.00     Average packs/day: 1 pack/day for 20.0 years (20.0 ttl pk-yrs)     Types: Cigarettes     Start date: 1995     Quit date: 2015     Years since quittin.5     Passive exposure: Past    Smokeless tobacco: Never   Vaping Use    Vaping status: Never Used   Substance and Sexual Activity    Alcohol use: No    Drug use: Yes     Types: Marijuana     Comment: for pain    Sexual activity: Defer     Birth control/protection: Surgical           Objective   Physical Exam  Vitals and nursing note reviewed.   Constitutional:       General: She is not in acute distress.     Appearance: She is underweight. She is not diaphoretic.   HENT:      Head: Normocephalic and atraumatic.   Eyes:      Extraocular Movements: Extraocular movements intact.      Pupils: Pupils are equal, round, and reactive to light.   Neck:      Vascular: No JVD.      Trachea: No tracheal deviation.   Cardiovascular:      Rate and Rhythm: Normal rate and regular rhythm.      Heart sounds: Normal heart sounds.   Pulmonary:      Effort: Pulmonary effort is normal.      Breath sounds: Normal breath sounds.   Chest:      Chest wall: Tenderness (right anterior chest wall tenderness on exam which reproduces the patient's complaint) present. No crepitus.   Abdominal:      General: Bowel sounds are normal.      Palpations: Abdomen is soft.      Tenderness: There is no abdominal tenderness.   Musculoskeletal:         General: Normal range of motion.      Cervical back:  Normal range of motion and neck supple.      Right lower leg: No edema.      Left lower leg: No edema.   Lymphadenopathy:      Cervical: No cervical adenopathy.   Skin:     General: Skin is warm and dry.      Capillary Refill: Capillary refill takes less than 2 seconds.      Comments: Small red wound to right lower buttock, no evidence of abscess, no cellulitis, no drainage   Neurological:      General: No focal deficit present.      Mental Status: She is alert and oriented to person, place, and time.   Psychiatric:         Mood and Affect: Mood normal.         Procedures       Results for orders placed or performed during the hospital encounter of 05/15/25   ECG 12 Lead ED Triage Standing Order; Chest Pain    Collection Time: 05/15/25  8:40 AM   Result Value Ref Range    QT Interval 402 ms    QTC Interval 452 ms   Comprehensive Metabolic Panel    Collection Time: 05/15/25  9:10 AM    Specimen: Blood   Result Value Ref Range    Glucose 131 (H) 65 - 99 mg/dL    BUN 6 6 - 20 mg/dL    Creatinine 0.55 (L) 0.57 - 1.00 mg/dL    Sodium 140 136 - 145 mmol/L    Potassium 3.9 3.5 - 5.2 mmol/L    Chloride 104 98 - 107 mmol/L    CO2 27.7 22.0 - 29.0 mmol/L    Calcium 8.7 8.6 - 10.5 mg/dL    Total Protein 6.6 6.0 - 8.5 g/dL    Albumin 3.9 3.5 - 5.2 g/dL    ALT (SGPT) 25 1 - 33 U/L    AST (SGOT) 27 1 - 32 U/L    Alkaline Phosphatase 85 39 - 117 U/L    Total Bilirubin 0.5 0.0 - 1.2 mg/dL    Globulin 2.7 gm/dL    A/G Ratio 1.4 g/dL    BUN/Creatinine Ratio 10.9 7.0 - 25.0    Anion Gap 8.3 5.0 - 15.0 mmol/L    eGFR 111.8 >60.0 mL/min/1.73   High Sensitivity Troponin T    Collection Time: 05/15/25  9:10 AM    Specimen: Blood   Result Value Ref Range    HS Troponin T 7 <14 ng/L   CBC Auto Differential    Collection Time: 05/15/25  9:10 AM    Specimen: Blood   Result Value Ref Range    WBC 6.53 3.40 - 10.80 10*3/mm3    RBC 4.61 3.77 - 5.28 10*6/mm3    Hemoglobin 14.2 12.0 - 15.9 g/dL    Hematocrit 42.6 34.0 - 46.6 %    MCV 92.4 79.0 -  97.0 fL    MCH 30.8 26.6 - 33.0 pg    MCHC 33.3 31.5 - 35.7 g/dL    RDW 13.2 12.3 - 15.4 %    RDW-SD 44.4 37.0 - 54.0 fl    MPV 10.7 6.0 - 12.0 fL    Platelets 148 140 - 450 10*3/mm3    Neutrophil % 71.6 42.7 - 76.0 %    Lymphocyte % 20.7 19.6 - 45.3 %    Monocyte % 5.4 5.0 - 12.0 %    Eosinophil % 1.2 0.3 - 6.2 %    Basophil % 0.3 0.0 - 1.5 %    Immature Grans % 0.8 (H) 0.0 - 0.5 %    Neutrophils, Absolute 4.68 1.70 - 7.00 10*3/mm3    Lymphocytes, Absolute 1.35 0.70 - 3.10 10*3/mm3    Monocytes, Absolute 0.35 0.10 - 0.90 10*3/mm3    Eosinophils, Absolute 0.08 0.00 - 0.40 10*3/mm3    Basophils, Absolute 0.02 0.00 - 0.20 10*3/mm3    Immature Grans, Absolute 0.05 0.00 - 0.05 10*3/mm3    nRBC 0.0 0.0 - 0.2 /100 WBC   Protime-INR    Collection Time: 05/15/25  9:10 AM    Specimen: Blood   Result Value Ref Range    Protime 13.6 11.6 - 15.1 Seconds    INR 1.03 0.90 - 1.10   aPTT    Collection Time: 05/15/25  9:10 AM    Specimen: Blood   Result Value Ref Range    PTT 83.2 (H) 24.5 - 35.9 seconds   CK    Collection Time: 05/15/25  9:10 AM    Specimen: Blood   Result Value Ref Range    Creatine Kinase 34 20 - 180 U/L   Magnesium    Collection Time: 05/15/25  9:10 AM    Specimen: Blood   Result Value Ref Range    Magnesium 1.8 1.6 - 2.6 mg/dL   Valproic Acid Level, Total    Collection Time: 05/15/25  9:10 AM    Specimen: Blood   Result Value Ref Range    Valproic Acid <2.8 (L) 50.0 - 125.0 mcg/mL   D-dimer, Quantitative    Collection Time: 05/15/25  9:10 AM    Specimen: Blood   Result Value Ref Range    D-Dimer, Quantitative 0.63 (H) 0.00 - 0.50 MCGFEU/mL   Green Top (Gel)    Collection Time: 05/15/25  9:10 AM   Result Value Ref Range    Extra Tube Hold for add-ons.    Lavender Top    Collection Time: 05/15/25  9:10 AM   Result Value Ref Range    Extra Tube hold for add-on    Gold Top - SST    Collection Time: 05/15/25  9:10 AM   Result Value Ref Range    Extra Tube Hold for add-ons.    Light Blue Top    Collection Time:  05/15/25  9:10 AM   Result Value Ref Range    Extra Tube Hold for add-ons.    High Sensitivity Troponin T 1Hr    Collection Time: 05/15/25 10:10 AM    Specimen: Blood   Result Value Ref Range    HS Troponin T <6 <14 ng/L    Troponin T Numeric Delta            ED Course  ED Course as of 05/15/25 1455   Thu May 15, 2025   1054 XR Chest 1 View  FINDINGS:    Lungs and pleural spaces:  Unremarkable.  No consolidation.  No  pneumothorax.    Heart:  Unremarkable.  No cardiomegaly.    Mediastinum:  Unremarkable.  Normal mediastinal contour.    Bones/joints:  Unremarkable.  No acute fracture.    Tubes, lines and devices:  Right Port-A-Cath stable.     IMPRESSION:    Stable chest. No acute cardiopulmonary findings.   [AH]   1107 CT Angiogram Chest Pulmonary Embolism  IMPRESSION:  1.  No pulmonary embolism.  2.  No effusion or pneumothorax.  3.  Filling defects of the left greater than right lower lobe bronchial  airways most consistent with mucous secretions or luminal debris.  4.  Lungs are essentially clear with no consolidative airspace disease  identified.    [AH]      ED Course User Index  [AH] Kristin Shell, PA                  HEART Score: 2   Shared Decision Making  I discussed the findings with the patient/patient representative who is in agreement with the treatment plan and the final disposition.  Risks and benefits of discharge and/or observation/admission were discussed: Yes                                      Medical Decision Making  50-year-old female who presents to the ED today for right sided chest pain.  CT chest for PE protocol showed no acute abnormalities, specifically no PE or pleural effusion.  No evidence of pneumonia.  EKG showed no acute ischemia.  Serial troponins were negative.  Plan is for discharge home and she will follow-up outpatient.  She was advised to return to the ED at any time if symptoms change or worsen.  I did prescribe her some bacitracin to put on the wound on her right  buttock.    Problems Addressed:  Chest wall pain: complicated acute illness or injury  Wound of right buttock, initial encounter: complicated acute illness or injury    Amount and/or Complexity of Data Reviewed  Labs: ordered.  Radiology: ordered. Decision-making details documented in ED Course.  ECG/medicine tests: ordered.    Risk  OTC drugs.  Prescription drug management.        Final diagnoses:   Chest wall pain   Wound of right buttock, initial encounter       ED Disposition  ED Disposition       ED Disposition   Discharge    Condition   Stable    Comment   --               Mabel Patterson, APRN  [address] [place] [address]  [phone]    Call in 1 day           Medication List        New Prescriptions      bacitracin 500 UNIT/GM ointment  Apply 1 Application topically to the appropriate area as directed 2 (Two) Times a Day.               Where to Get Your Medications        These medications were sent to [facility] - [place], [place] - [address] [place] [address] - [phone]  - [phone] [place] [address]      Phone: [phone]   bacitracin 500 UNIT/GM ointment            Kristin Shell PA  05/15/25 2241

## 2025-05-15 NOTE — DISCHARGE INSTRUCTIONS
Rest at home, follow-up with your family doctor in the office at the next available appointment.  Continue your home medications as prescribed.  Return to the ED at any time if symptoms change or worsen.  A prescription for bacitracin to apply to your wound twice a day has been sent to the pharmacy.

## 2025-05-16 LAB
QT INTERVAL: 402 MS
QTC INTERVAL: 452 MS

## 2025-05-28 ENCOUNTER — APPOINTMENT (OUTPATIENT)
Dept: CT IMAGING | Facility: HOSPITAL | Age: 50
End: 2025-05-28
Payer: COMMERCIAL

## 2025-05-28 ENCOUNTER — HOSPITAL ENCOUNTER (EMERGENCY)
Facility: HOSPITAL | Age: 50
Discharge: HOME | End: 2025-05-28
Payer: COMMERCIAL

## 2025-05-28 VITALS
DIASTOLIC BLOOD PRESSURE: 91 MMHG | SYSTOLIC BLOOD PRESSURE: 126 MMHG | TEMPERATURE: 98 F | BODY MASS INDEX: 17.13 KG/M2 | HEIGHT: 68 IN | WEIGHT: 113 LBS | OXYGEN SATURATION: 100 % | HEART RATE: 95 BPM | RESPIRATION RATE: 18 BRPM

## 2025-05-28 DIAGNOSIS — R31.0 GROSS HEMATURIA: ICD-10-CM

## 2025-05-28 DIAGNOSIS — R10.9 ACUTE LEFT FLANK PAIN: Primary | ICD-10-CM

## 2025-05-28 LAB
ALBUMIN SERPL-MCNC: 4.7 G/DL (ref 3.5–5.2)
ALBUMIN/GLOB SERPL: 1.6 G/DL
ALP SERPL-CCNC: 96 U/L (ref 39–117)
ALT SERPL W P-5'-P-CCNC: 33 U/L (ref 1–33)
ANION GAP SERPL CALCULATED.3IONS-SCNC: 10.6 MMOL/L (ref 5–15)
AST SERPL-CCNC: 32 U/L (ref 1–32)
B-HCG UR QL: NEGATIVE
BACTERIA UR QL AUTO: ABNORMAL /HPF
BASOPHILS # BLD AUTO: 0.02 10*3/MM3 (ref 0–0.2)
BASOPHILS NFR BLD AUTO: 0.4 % (ref 0–1.5)
BILIRUB SERPL-MCNC: 0.6 MG/DL (ref 0–1.2)
BILIRUB UR QL STRIP: NEGATIVE
BUN SERPL-MCNC: 10.6 MG/DL (ref 6–20)
BUN/CREAT SERPL: 14.9 (ref 7–25)
CALCIUM SPEC-SCNC: 9.4 MG/DL (ref 8.6–10.5)
CHLORIDE SERPL-SCNC: 101 MMOL/L (ref 98–107)
CLARITY UR: ABNORMAL
CO2 SERPL-SCNC: 28.4 MMOL/L (ref 22–29)
COLOR UR: ABNORMAL
CREAT SERPL-MCNC: 0.71 MG/DL (ref 0.57–1)
D-LACTATE SERPL-SCNC: 1.2 MMOL/L (ref 0.5–2)
DEPRECATED RDW RBC AUTO: 46.5 FL (ref 37–54)
EGFRCR SERPLBLD CKD-EPI 2021: 103.7 ML/MIN/1.73
EOSINOPHIL # BLD AUTO: 0.06 10*3/MM3 (ref 0–0.4)
EOSINOPHIL NFR BLD AUTO: 1.1 % (ref 0.3–6.2)
ERYTHROCYTE [DISTWIDTH] IN BLOOD BY AUTOMATED COUNT: 13.2 % (ref 12.3–15.4)
GLOBULIN UR ELPH-MCNC: 3 GM/DL
GLUCOSE SERPL-MCNC: 116 MG/DL (ref 65–99)
GLUCOSE UR STRIP-MCNC: NEGATIVE MG/DL
HCT VFR BLD AUTO: 53.7 % (ref 34–46.6)
HGB BLD-MCNC: 17.1 G/DL (ref 12–15.9)
HGB UR QL STRIP.AUTO: ABNORMAL
HOLD SPECIMEN: NORMAL
HYALINE CASTS UR QL AUTO: ABNORMAL /LPF
IMM GRANULOCYTES # BLD AUTO: 0.02 10*3/MM3 (ref 0–0.05)
IMM GRANULOCYTES NFR BLD AUTO: 0.4 % (ref 0–0.5)
KETONES UR QL STRIP: NEGATIVE
LEUKOCYTE ESTERASE UR QL STRIP.AUTO: ABNORMAL
LIPASE SERPL-CCNC: 31 U/L (ref 13–60)
LYMPHOCYTES # BLD AUTO: 1.74 10*3/MM3 (ref 0.7–3.1)
LYMPHOCYTES NFR BLD AUTO: 30.5 % (ref 19.6–45.3)
MCH RBC QN AUTO: 30.4 PG (ref 26.6–33)
MCHC RBC AUTO-ENTMCNC: 31.8 G/DL (ref 31.5–35.7)
MCV RBC AUTO: 95.4 FL (ref 79–97)
MONOCYTES # BLD AUTO: 0.36 10*3/MM3 (ref 0.1–0.9)
MONOCYTES NFR BLD AUTO: 6.3 % (ref 5–12)
NEUTROPHILS NFR BLD AUTO: 3.5 10*3/MM3 (ref 1.7–7)
NEUTROPHILS NFR BLD AUTO: 61.3 % (ref 42.7–76)
NITRITE UR QL STRIP: NEGATIVE
NRBC BLD AUTO-RTO: 0 /100 WBC (ref 0–0.2)
PH UR STRIP.AUTO: 6 [PH] (ref 5–8)
PLATELET # BLD AUTO: 186 10*3/MM3 (ref 140–450)
PMV BLD AUTO: 10 FL (ref 6–12)
POTASSIUM SERPL-SCNC: 4 MMOL/L (ref 3.5–5.2)
PROT SERPL-MCNC: 7.7 G/DL (ref 6–8.5)
PROT UR QL STRIP: ABNORMAL
RBC # BLD AUTO: 5.63 10*6/MM3 (ref 3.77–5.28)
RBC # UR STRIP: ABNORMAL /HPF
REF LAB TEST METHOD: ABNORMAL
SODIUM SERPL-SCNC: 140 MMOL/L (ref 136–145)
SP GR UR STRIP: 1.01 (ref 1–1.03)
SQUAMOUS #/AREA URNS HPF: ABNORMAL /HPF
UROBILINOGEN UR QL STRIP: ABNORMAL
WBC # UR STRIP: ABNORMAL /HPF
WBC NRBC COR # BLD AUTO: 5.7 10*3/MM3 (ref 3.4–10.8)
WHOLE BLOOD HOLD COAG: NORMAL
WHOLE BLOOD HOLD SPECIMEN: NORMAL

## 2025-05-28 PROCEDURE — 96375 TX/PRO/DX INJ NEW DRUG ADDON: CPT

## 2025-05-28 PROCEDURE — 25010000002 ONDANSETRON PER 1 MG

## 2025-05-28 PROCEDURE — 25010000002 HYDROMORPHONE PER 4 MG

## 2025-05-28 PROCEDURE — 74176 CT ABD & PELVIS W/O CONTRAST: CPT | Performed by: RADIOLOGY

## 2025-05-28 PROCEDURE — 81001 URINALYSIS AUTO W/SCOPE: CPT

## 2025-05-28 PROCEDURE — 25010000002 HYDROMORPHONE 1 MG/ML SOLUTION

## 2025-05-28 PROCEDURE — 96376 TX/PRO/DX INJ SAME DRUG ADON: CPT

## 2025-05-28 PROCEDURE — 96374 THER/PROPH/DIAG INJ IV PUSH: CPT

## 2025-05-28 PROCEDURE — 99284 EMERGENCY DEPT VISIT MOD MDM: CPT

## 2025-05-28 PROCEDURE — 36415 COLL VENOUS BLD VENIPUNCTURE: CPT

## 2025-05-28 PROCEDURE — 25810000003 LACTATED RINGERS SOLUTION

## 2025-05-28 PROCEDURE — 81025 URINE PREGNANCY TEST: CPT

## 2025-05-28 PROCEDURE — 80053 COMPREHEN METABOLIC PANEL: CPT

## 2025-05-28 PROCEDURE — 83690 ASSAY OF LIPASE: CPT

## 2025-05-28 PROCEDURE — 74176 CT ABD & PELVIS W/O CONTRAST: CPT

## 2025-05-28 PROCEDURE — 85025 COMPLETE CBC W/AUTO DIFF WBC: CPT

## 2025-05-28 PROCEDURE — 83605 ASSAY OF LACTIC ACID: CPT

## 2025-05-28 PROCEDURE — 96361 HYDRATE IV INFUSION ADD-ON: CPT

## 2025-05-28 RX ORDER — ONDANSETRON 2 MG/ML
4 INJECTION INTRAMUSCULAR; INTRAVENOUS ONCE
Status: COMPLETED | OUTPATIENT
Start: 2025-05-28 | End: 2025-05-28

## 2025-05-28 RX ORDER — HYDROCODONE BITARTRATE AND ACETAMINOPHEN 10; 325 MG/1; MG/1
1 TABLET ORAL ONCE
Refills: 0 | Status: COMPLETED | OUTPATIENT
Start: 2025-05-28 | End: 2025-05-28

## 2025-05-28 RX ORDER — HYDROCODONE BITARTRATE AND ACETAMINOPHEN 10; 325 MG/1; MG/1
1 TABLET ORAL EVERY 8 HOURS PRN
Qty: 12 TABLET | Refills: 0 | Status: SHIPPED | OUTPATIENT
Start: 2025-05-28

## 2025-05-28 RX ORDER — SODIUM CHLORIDE 0.9 % (FLUSH) 0.9 %
10 SYRINGE (ML) INJECTION AS NEEDED
Status: DISCONTINUED | OUTPATIENT
Start: 2025-05-28 | End: 2025-05-28 | Stop reason: HOSPADM

## 2025-05-28 RX ORDER — HYDROMORPHONE HYDROCHLORIDE 1 MG/ML
0.5 INJECTION, SOLUTION INTRAMUSCULAR; INTRAVENOUS; SUBCUTANEOUS ONCE
Status: COMPLETED | OUTPATIENT
Start: 2025-05-28 | End: 2025-05-28

## 2025-05-28 RX ADMIN — HYDROMORPHONE HYDROCHLORIDE 0.5 MG: 1 INJECTION, SOLUTION INTRAMUSCULAR; INTRAVENOUS; SUBCUTANEOUS at 08:58

## 2025-05-28 RX ADMIN — ONDANSETRON HYDROCHLORIDE 4 MG: 2 SOLUTION INTRAMUSCULAR; INTRAVENOUS at 08:58

## 2025-05-28 RX ADMIN — HYDROCODONE BITARTRATE AND ACETAMINOPHEN 1 TABLET: 10; 325 TABLET ORAL at 12:34

## 2025-05-28 RX ADMIN — HYDROMORPHONE HYDROCHLORIDE 1 MG: 1 INJECTION, SOLUTION INTRAMUSCULAR; INTRAVENOUS; SUBCUTANEOUS at 10:26

## 2025-05-28 RX ADMIN — SODIUM CHLORIDE, POTASSIUM CHLORIDE, SODIUM LACTATE AND CALCIUM CHLORIDE 1000 ML: 600; 310; 30; 20 INJECTION, SOLUTION INTRAVENOUS at 08:59

## 2025-05-28 NOTE — DISCHARGE INSTRUCTIONS
Patient should have her urinalysis repeated in 3 to 4 weeks to ensure resolution of hematuria.  Otherwise, the patient should have follow-up with urology arranged for further investigation.    Patient has been given information on hematuria and flank pain.  
Attending

## 2025-05-28 NOTE — ED PROVIDER NOTES
Subjective   History of Present Illness  Patient presents today with left flank pain since last night.  The patient states that it radiates around into her left groin.  Patient states that she has frequency, urgency, and dysuria.  She has hematuria as well as nausea and vomiting.  Patient has no fever or other systemic symptoms.  The patient denies any other GI or  symptoms, except as stated as above.      Review of Systems   Constitutional:  Positive for activity change and appetite change. Negative for chills, diaphoresis and fever.   HENT: Negative.     Respiratory: Negative.     Cardiovascular: Negative.    Gastrointestinal:  Positive for abdominal pain, nausea and vomiting. Negative for abdominal distention, constipation and diarrhea.   Genitourinary:  Positive for dysuria, flank pain, frequency, hematuria and urgency. Negative for difficulty urinating.   Skin: Negative.    Neurological: Negative.    Psychiatric/Behavioral: Negative.         Past Medical History:   Diagnosis Date    Hoyos's disease     Bipolar 1 disorder     Constipation     DDD (degenerative disc disease), cervical 05/29/2017    Fibromyalgia     IBS (irritable bowel syndrome)     Meningioma     Migraine     PONV (postoperative nausea and vomiting)     PTSD (post-traumatic stress disorder)     Rectal bleeding        Allergies   Allergen Reactions    Ativan [Lorazepam] Hallucinations     confusion    Sulfa Antibiotics Shortness Of Breath and Swelling    Sulfa Antibiotics Anaphylaxis    Reglan [Metoclopramide] Angioedema    Compazine [Prochlorperazine Edisylate] Hives    Demerol [Meperidine] Hives    Droperidol Itching    Metoclopramide Swelling    Toradol [Ketorolac Tromethamine] Hives and Itching    Toradol [Ketorolac Tromethamine] Hives    Zosyn [Piperacillin Sod-Tazobactam So] Hives       Past Surgical History:   Procedure Laterality Date    ANAL SCOPE N/A 7/28/2016    Procedure: ANAL SCOPE;  Surgeon: Kael Lopez MD;  Location: [facility]  OR;  Service:     APPENDECTOMY      COLONOSCOPY N/A 2016    Procedure: COLONOSCOPY  CPTCODE:70306;  Surgeon: Jose Antonio Belle III, MD;  Location: [facility] OR;  Service:     COLONOSCOPY N/A 2016    Procedure: COLONOSCOPY (77392) CPT;  Surgeon: Jose Antonio Belle III, MD;  Location: [facility] OR;  Service:     CYSTOSCOPY RETROGRADE PYELOGRAM Bilateral 2017    Procedure: CYSTOSCOPY RETROGRADE PYELOGRAM;  Surgeon: Mu Blunt MD;  Location: [facility] OR;  Service:     ENDOSCOPY N/A 2016    Procedure: ESOPHAGOGASTRODUODENOSCOPY WITH BIOPSY  CPTCODE:21961;  Surgeon: Jose Antonio Belle III, MD;  Location: [facility] OR;  Service:     HEMORRHOIDECTOMY N/A 2016    Procedure: HEMORRHOID STAPLING;  Surgeon: Kael Lopez MD;  Location: [facility] OR;  Service:     HYSTERECTOMY      KNEE SURGERY      LAPAROSCOPIC SALPINGOOPHERECTOMY      PORTACATH PLACEMENT N/A 2017    Procedure: INSERTION OF PORTACATH;  Surgeon: Celso Arredondo MD;  Location: [facility] OR;  Service:     SHOULDER SURGERY      3 times       Family History   Problem Relation Age of Onset    Crohn's disease Other     Hypertension Other     Diabetes Other     Irritable bowel syndrome Other     No Known Problems Father     No Known Problems Mother        Social History     Socioeconomic History    Marital status:    Tobacco Use    Smoking status: Former     Current packs/day: 0.00     Average packs/day: 1 pack/day for 20.0 years (20.0 ttl pk-yrs)     Types: Cigarettes     Start date: 1995     Quit date: 2015     Years since quittin.5     Passive exposure: Past    Smokeless tobacco: Never   Vaping Use    Vaping status: Never Used   Substance and Sexual Activity    Alcohol use: No    Drug use: Yes     Types: Marijuana     Comment: for pain    Sexual activity: Defer     Birth control/protection: Surgical           Objective   Physical Exam  Vitals and nursing note reviewed.   Constitutional:       General: She is  awake. She is not in acute distress.     Appearance: She is underweight. She is ill-appearing. She is not toxic-appearing or diaphoretic.   HENT:      Head: Normocephalic and atraumatic.      Mouth/Throat:      Mouth: Mucous membranes are dry.      Pharynx: Oropharynx is clear. No oropharyngeal exudate or posterior oropharyngeal erythema.   Cardiovascular:      Rate and Rhythm: Regular rhythm. Tachycardia present.   Abdominal:      General: Abdomen is flat. There is no distension.      Palpations: Abdomen is soft. There is no mass.      Tenderness: There is abdominal tenderness. There is left CVA tenderness and guarding. There is no right CVA tenderness or rebound.      Hernia: No hernia is present.   Skin:     Coloration: Skin is not jaundiced.      Findings: No bruising, erythema or rash.   Neurological:      General: No focal deficit present.      Mental Status: She is alert and oriented to person, place, and time. Mental status is at baseline.      Cranial Nerves: No cranial nerve deficit.   Psychiatric:         Behavior: Behavior normal. Behavior is cooperative.         Thought Content: Thought content normal.         Judgment: Judgment normal.         Procedures           ED Course  ED Course as of 05/28/25 1232   Wed May 28, 2025   1223 Hemoglobin(!): 17.1 [RA]   1223 RBC, UA(!): Too Numerous to Count [RA]   1223 WBC, UA(!): 3-5 [RA]   1223 Blood, UA(!): Large (3+) [RA]   1223 Protein, UA(!): 30 mg/dL (1+) [RA]   1223 Leukocytes, UA(!): Trace [RA]   1223 CT Abdomen Pelvis Stone Protocol  No acute intra-abdominal pathology [RA]      ED Course User Index  [RA] Oziel Griffin MD                                                       Medical Decision Making  Problems Addressed:  Acute left flank pain: complicated acute illness or injury  Gross hematuria: complicated acute illness or injury    Amount and/or Complexity of Data Reviewed  Labs: ordered. Decision-making details documented in ED  Course.  Radiology: ordered. Decision-making details documented in ED Course.    Risk  OTC drugs.  Prescription drug management.        Final diagnoses:   Acute left flank pain   Gross hematuria       ED Disposition  ED Disposition       ED Disposition   Discharge    Condition   Stable    Comment   --               Mabel Patterson, APRN  [address]  [address] [address]  [phone]    Schedule an appointment as soon as possible for a visit in 1 week           Medication List      No changes were made to your prescriptions during this visit.            Oziel Griffin MD  05/28/25 1237

## 2025-05-31 ENCOUNTER — HOSPITAL ENCOUNTER (EMERGENCY)
Facility: HOSPITAL | Age: 50
Discharge: HOME | End: 2025-05-31
Payer: COMMERCIAL

## 2025-05-31 ENCOUNTER — APPOINTMENT (OUTPATIENT)
Dept: CT IMAGING | Facility: HOSPITAL | Age: 50
End: 2025-05-31
Payer: COMMERCIAL

## 2025-05-31 VITALS
HEART RATE: 80 BPM | TEMPERATURE: 97.5 F | RESPIRATION RATE: 20 BRPM | HEIGHT: 68 IN | BODY MASS INDEX: 18.04 KG/M2 | WEIGHT: 119 LBS | OXYGEN SATURATION: 98 % | DIASTOLIC BLOOD PRESSURE: 74 MMHG | SYSTOLIC BLOOD PRESSURE: 105 MMHG

## 2025-05-31 DIAGNOSIS — R10.84 GENERALIZED ABDOMINAL PAIN: Primary | ICD-10-CM

## 2025-05-31 LAB
ALBUMIN SERPL-MCNC: 4.4 G/DL (ref 3.5–5.2)
ALBUMIN/GLOB SERPL: 1.7 G/DL
ALP SERPL-CCNC: 92 U/L (ref 39–117)
ALT SERPL W P-5'-P-CCNC: 28 U/L (ref 1–33)
ANION GAP SERPL CALCULATED.3IONS-SCNC: 10 MMOL/L (ref 5–15)
AST SERPL-CCNC: 28 U/L (ref 1–32)
BACTERIA UR QL AUTO: ABNORMAL /HPF
BASOPHILS # BLD AUTO: 0.02 10*3/MM3 (ref 0–0.2)
BASOPHILS NFR BLD AUTO: 0.3 % (ref 0–1.5)
BILIRUB SERPL-MCNC: 0.5 MG/DL (ref 0–1.2)
BILIRUB UR QL STRIP: NEGATIVE
BUN SERPL-MCNC: 6.8 MG/DL (ref 6–20)
BUN/CREAT SERPL: 12.8 (ref 7–25)
CALCIUM SPEC-SCNC: 9.1 MG/DL (ref 8.6–10.5)
CHLORIDE SERPL-SCNC: 104 MMOL/L (ref 98–107)
CLARITY UR: ABNORMAL
CO2 SERPL-SCNC: 26 MMOL/L (ref 22–29)
COLOR UR: ABNORMAL
CREAT SERPL-MCNC: 0.53 MG/DL (ref 0.57–1)
DEPRECATED RDW RBC AUTO: 43.8 FL (ref 37–54)
EGFRCR SERPLBLD CKD-EPI 2021: 112.8 ML/MIN/1.73
EOSINOPHIL # BLD AUTO: 0.03 10*3/MM3 (ref 0–0.4)
EOSINOPHIL NFR BLD AUTO: 0.5 % (ref 0.3–6.2)
ERYTHROCYTE [DISTWIDTH] IN BLOOD BY AUTOMATED COUNT: 13 % (ref 12.3–15.4)
GLOBULIN UR ELPH-MCNC: 2.6 GM/DL
GLUCOSE SERPL-MCNC: 99 MG/DL (ref 65–99)
GLUCOSE UR STRIP-MCNC: NEGATIVE MG/DL
HCT VFR BLD AUTO: 46.3 % (ref 34–46.6)
HGB BLD-MCNC: 15.1 G/DL (ref 12–15.9)
HGB UR QL STRIP.AUTO: ABNORMAL
HYALINE CASTS UR QL AUTO: ABNORMAL /LPF
IMM GRANULOCYTES # BLD AUTO: 0.01 10*3/MM3 (ref 0–0.05)
IMM GRANULOCYTES NFR BLD AUTO: 0.2 % (ref 0–0.5)
KETONES UR QL STRIP: NEGATIVE
LEUKOCYTE ESTERASE UR QL STRIP.AUTO: ABNORMAL
LYMPHOCYTES # BLD AUTO: 1.82 10*3/MM3 (ref 0.7–3.1)
LYMPHOCYTES NFR BLD AUTO: 28.7 % (ref 19.6–45.3)
MCH RBC QN AUTO: 30.1 PG (ref 26.6–33)
MCHC RBC AUTO-ENTMCNC: 32.6 G/DL (ref 31.5–35.7)
MCV RBC AUTO: 92.2 FL (ref 79–97)
MONOCYTES # BLD AUTO: 0.31 10*3/MM3 (ref 0.1–0.9)
MONOCYTES NFR BLD AUTO: 4.9 % (ref 5–12)
NEUTROPHILS NFR BLD AUTO: 4.16 10*3/MM3 (ref 1.7–7)
NEUTROPHILS NFR BLD AUTO: 65.4 % (ref 42.7–76)
NITRITE UR QL STRIP: NEGATIVE
NRBC BLD AUTO-RTO: 0 /100 WBC (ref 0–0.2)
PH UR STRIP.AUTO: >=9 [PH] (ref 5–8)
PLATELET # BLD AUTO: 191 10*3/MM3 (ref 140–450)
PMV BLD AUTO: 10 FL (ref 6–12)
POTASSIUM SERPL-SCNC: 4 MMOL/L (ref 3.5–5.2)
PROT SERPL-MCNC: 7 G/DL (ref 6–8.5)
PROT UR QL STRIP: ABNORMAL
RBC # BLD AUTO: 5.02 10*6/MM3 (ref 3.77–5.28)
RBC # UR STRIP: ABNORMAL /HPF
REF LAB TEST METHOD: ABNORMAL
SODIUM SERPL-SCNC: 140 MMOL/L (ref 136–145)
SP GR UR STRIP: 1.01 (ref 1–1.03)
SQUAMOUS #/AREA URNS HPF: ABNORMAL /HPF
TROPONIN T SERPL HS-MCNC: <6 NG/L
UROBILINOGEN UR QL STRIP: ABNORMAL
WBC # UR STRIP: ABNORMAL /HPF
WBC NRBC COR # BLD AUTO: 6.35 10*3/MM3 (ref 3.4–10.8)

## 2025-05-31 PROCEDURE — 25010000002 MORPHINE PER 10 MG: Performed by: NURSE PRACTITIONER

## 2025-05-31 PROCEDURE — 25810000003 SODIUM CHLORIDE 0.9 % SOLUTION: Performed by: NURSE PRACTITIONER

## 2025-05-31 PROCEDURE — 74176 CT ABD & PELVIS W/O CONTRAST: CPT

## 2025-05-31 PROCEDURE — 80053 COMPREHEN METABOLIC PANEL: CPT | Performed by: NURSE PRACTITIONER

## 2025-05-31 PROCEDURE — 96376 TX/PRO/DX INJ SAME DRUG ADON: CPT

## 2025-05-31 PROCEDURE — 93005 ELECTROCARDIOGRAM TRACING: CPT | Performed by: NURSE PRACTITIONER

## 2025-05-31 PROCEDURE — 81001 URINALYSIS AUTO W/SCOPE: CPT | Performed by: NURSE PRACTITIONER

## 2025-05-31 PROCEDURE — 25010000002 HEPARIN LOCK FLUSH PER 10 UNITS: Performed by: NURSE PRACTITIONER

## 2025-05-31 PROCEDURE — 99284 EMERGENCY DEPT VISIT MOD MDM: CPT

## 2025-05-31 PROCEDURE — 25010000002 ONDANSETRON PER 1 MG: Performed by: NURSE PRACTITIONER

## 2025-05-31 PROCEDURE — 96375 TX/PRO/DX INJ NEW DRUG ADDON: CPT

## 2025-05-31 PROCEDURE — 96374 THER/PROPH/DIAG INJ IV PUSH: CPT

## 2025-05-31 PROCEDURE — 85025 COMPLETE CBC W/AUTO DIFF WBC: CPT | Performed by: NURSE PRACTITIONER

## 2025-05-31 PROCEDURE — 36415 COLL VENOUS BLD VENIPUNCTURE: CPT

## 2025-05-31 PROCEDURE — 84484 ASSAY OF TROPONIN QUANT: CPT | Performed by: NURSE PRACTITIONER

## 2025-05-31 PROCEDURE — 96361 HYDRATE IV INFUSION ADD-ON: CPT

## 2025-05-31 RX ORDER — ONDANSETRON 2 MG/ML
4 INJECTION INTRAMUSCULAR; INTRAVENOUS ONCE
Status: COMPLETED | OUTPATIENT
Start: 2025-05-31 | End: 2025-05-31

## 2025-05-31 RX ORDER — MORPHINE SULFATE 2 MG/ML
2 INJECTION, SOLUTION INTRAMUSCULAR; INTRAVENOUS ONCE
Status: COMPLETED | OUTPATIENT
Start: 2025-05-31 | End: 2025-05-31

## 2025-05-31 RX ORDER — SODIUM CHLORIDE 0.9 % (FLUSH) 0.9 %
10 SYRINGE (ML) INJECTION AS NEEDED
Status: DISCONTINUED | OUTPATIENT
Start: 2025-05-31 | End: 2025-05-31 | Stop reason: HOSPADM

## 2025-05-31 RX ORDER — MUPIROCIN 20 MG/G
1 OINTMENT TOPICAL 3 TIMES DAILY
Qty: 22 G | Refills: 0 | Status: SHIPPED | OUTPATIENT
Start: 2025-05-31

## 2025-05-31 RX ORDER — HEPARIN SODIUM (PORCINE) LOCK FLUSH IV SOLN 100 UNIT/ML 100 UNIT/ML
300 SOLUTION INTRAVENOUS ONCE
Status: COMPLETED | OUTPATIENT
Start: 2025-05-31 | End: 2025-05-31

## 2025-05-31 RX ADMIN — SODIUM CHLORIDE 500 ML: 9 INJECTION, SOLUTION INTRAVENOUS at 13:29

## 2025-05-31 RX ADMIN — MORPHINE SULFATE 2 MG: 2 INJECTION, SOLUTION INTRAMUSCULAR; INTRAVENOUS at 13:31

## 2025-05-31 RX ADMIN — MORPHINE SULFATE 2 MG: 2 INJECTION, SOLUTION INTRAMUSCULAR; INTRAVENOUS at 12:45

## 2025-05-31 RX ADMIN — MORPHINE SULFATE 4 MG: 4 INJECTION, SOLUTION INTRAMUSCULAR; INTRAVENOUS at 12:15

## 2025-05-31 RX ADMIN — HEPARIN 300 UNITS: 100 SYRINGE at 14:11

## 2025-05-31 RX ADMIN — ONDANSETRON 4 MG: 2 INJECTION INTRAMUSCULAR; INTRAVENOUS at 12:45

## 2025-05-31 RX ADMIN — SODIUM CHLORIDE 1000 ML: 9 INJECTION, SOLUTION INTRAVENOUS at 12:15

## 2025-05-31 RX ADMIN — ONDANSETRON 4 MG: 2 INJECTION INTRAMUSCULAR; INTRAVENOUS at 12:15

## 2025-05-31 NOTE — DISCHARGE INSTRUCTIONS
Please follow up with your primary care physician in the next 1-3 days. If this is not possible, please return to the ED for re-evaluation.    It is IMPORTANT to see your DOCTOR OR PRIMARY CARE PROVIDER. Emergency Care may be incomplete without proper follow-up.  Your evaluation in the emergency department is focused on a specific clinical complaint, at a given moment in time.  Symptoms sometimes change or new symptoms might arise after you leave the Emergency Department. It is important that you call your doctor if you become worse in any way, or promptly return to the Emergency Department should any new, or worsening/changing symptom occur.  You are strongly urged to follow-up with your physician to assure complete and thorough care. PLEASE CALL YOUR DOCTORS OFFICE TODAY, INFORM THEM THAT YOU WERE SEEN IN THE EMERGENCY DEPARTMENT, AND THAT YOU NEED TO BE SEEN IMMEDIATELY FOR CLOSE FOLLOW UP.  If you do not have a primary care doctor we encourage you to proactively seek a local physician for close follow up.  Consider local clinics, free or sliding scale clinics, local [facility].  You can always return to the Emergency Department if you are having difficulty coordinating close follow up.  If medications were prescribed you should fill them at your local pharmacy immediately and take only as prescribed.  Bring your new medications to your doctors follow up visit to discuss any changes that would be necessary. RETURN TO THE EMERGENCY DEPARTMENT IMMEDIATELY FOR ANY NEW OR WORSENING SYMPTOMS.    ADDITIONAL DISCHARGE INSTRUCTIONS:     - If you received IV contrast today with a CT or MRI please stay well hydrated for the next 48-72 hours to protect your kidneys.    - If you have been prescribed an antibiotic, taking an over the counter probiotic may help with gastrointestinal side effects and antibiotic associated diarrhea.    - Return to the emergency department or seek immediate medical care if any of the  following occur:           - Symptoms do not improve in 8-12 hours. If this occurs, please return to the emergency department for re-evaluation or your symptoms or repeat abdominal examination         - Symptoms worsen at any time.         - If you are unable to follow up with a medical provider as instructed.         - You develop any worrisome symptoms such as fever, chills, uncontrolled pain, change or worsening of your pain symptoms, intractable vomiting, uncontrolled diarrhea, blood in your stool, dark/tarry stool, new neurological symptoms etc.    If you have been prescribed a narcotic pain medication, please take a stool softener to prevent constipation.     During your ED visit, you may have been given narcotics (such as morphine, dilaudid, percocet, vicodin) or sedatives (such as ativan, versed). These medications can impair your reflexes so, DO NOT DRIVE or USE ANY MACHINERY for at least 6 hours if you have been given these medications.    REMINDER FOR METFORMIN USERS: If you are using metformin (also known as Glucophage) and if you received a CT scan in which you received IV contrast, you must hold your metformin for a total of 72 hrs.  This will prevent any adverse interactions between the two agents.

## 2025-05-31 NOTE — ED PROVIDER NOTES
Subjective   History of Present Illness  Patient is a 50-year-old female presents today complaining of abdominal pain.  Patient also reports of blood in urine.  Patient reports this has been an ongoing problem.  Patient also reports some nausea.  Patient denies alleviating or worsening factors.  Patient denies trying any interventions.        Review of Systems   Constitutional: Negative.    HENT: Negative.     Eyes: Negative.    Respiratory: Negative.     Cardiovascular: Negative.    Gastrointestinal: Negative.    Endocrine: Negative.    Genitourinary: Negative.    Musculoskeletal: Negative.    Skin: Negative.    Allergic/Immunologic: Negative.    Neurological: Negative.    Hematological: Negative.    Psychiatric/Behavioral: Negative.         Past Medical History:   Diagnosis Date    Hoyos's disease     Bipolar 1 disorder     Constipation     DDD (degenerative disc disease), cervical 05/29/2017    Fibromyalgia     IBS (irritable bowel syndrome)     Meningioma     Migraine     PONV (postoperative nausea and vomiting)     PTSD (post-traumatic stress disorder)     Rectal bleeding        Allergies   Allergen Reactions    Ativan [Lorazepam] Hallucinations     confusion    Sulfa Antibiotics Shortness Of Breath and Swelling    Sulfa Antibiotics Anaphylaxis    Reglan [Metoclopramide] Angioedema    Compazine [Prochlorperazine Edisylate] Hives    Demerol [Meperidine] Hives    Droperidol Itching    Metoclopramide Swelling    Toradol [Ketorolac Tromethamine] Hives and Itching    Toradol [Ketorolac Tromethamine] Hives    Zosyn [Piperacillin Sod-Tazobactam So] Hives       Past Surgical History:   Procedure Laterality Date    ANAL SCOPE N/A 7/28/2016    Procedure: ANAL SCOPE;  Surgeon: Kael Lopez MD;  Location: [facility];  Service:     APPENDECTOMY      COLONOSCOPY N/A 6/30/2016    Procedure: COLONOSCOPY  CPTCODE:22051;  Surgeon: Jose Antonio Belle III, MD;  Location: [facility] OR;  Service:     COLONOSCOPY N/A 7/7/2016     Procedure: COLONOSCOPY (48627) CPT;  Surgeon: Jose Antonio Belle III, MD;  Location: [facility] OR;  Service:     CYSTOSCOPY RETROGRADE PYELOGRAM Bilateral 2017    Procedure: CYSTOSCOPY RETROGRADE PYELOGRAM;  Surgeon: Mu Blunt MD;  Location: [facility] OR;  Service:     ENDOSCOPY N/A 2016    Procedure: ESOPHAGOGASTRODUODENOSCOPY WITH BIOPSY  CPTCODE:38786;  Surgeon: Jose Antonio Belle III, MD;  Location: [facility] OR;  Service:     HEMORRHOIDECTOMY N/A 2016    Procedure: HEMORRHOID STAPLING;  Surgeon: Kael Lopez MD;  Location: [facility] OR;  Service:     HYSTERECTOMY      KNEE SURGERY      LAPAROSCOPIC SALPINGOOPHERECTOMY      PORTACATH PLACEMENT N/A 2017    Procedure: INSERTION OF PORTACATH;  Surgeon: Celso Arredondo MD;  Location: [facility] OR;  Service:     SHOULDER SURGERY      3 times       Family History   Problem Relation Age of Onset    Crohn's disease Other     Hypertension Other     Diabetes Other     Irritable bowel syndrome Other     No Known Problems Father     No Known Problems Mother        Social History     Socioeconomic History    Marital status:    Tobacco Use    Smoking status: Former     Current packs/day: 0.00     Average packs/day: 1 pack/day for 20.0 years (20.0 ttl pk-yrs)     Types: Cigarettes     Start date: 1995     Quit date: 2015     Years since quittin.5     Passive exposure: Past    Smokeless tobacco: Never   Vaping Use    Vaping status: Never Used   Substance and Sexual Activity    Alcohol use: No    Drug use: Yes     Types: Marijuana     Comment: for pain    Sexual activity: Defer     Birth control/protection: Surgical           Objective   Physical Exam  Vitals and nursing note reviewed.   Constitutional:       Appearance: She is well-developed.   HENT:      Head: Normocephalic.      Right Ear: External ear normal.      Left Ear: External ear normal.   Eyes:      Conjunctiva/sclera: Conjunctivae normal.      Pupils: Pupils are  equal, round, and reactive to light.   Cardiovascular:      Rate and Rhythm: Normal rate and regular rhythm.      Heart sounds: Normal heart sounds.   Pulmonary:      Effort: Pulmonary effort is normal.      Breath sounds: Normal breath sounds.   Abdominal:      General: Bowel sounds are normal.      Palpations: Abdomen is soft.   Musculoskeletal:         General: Normal range of motion.      Cervical back: Normal range of motion and neck supple.   Skin:     General: Skin is warm and dry.      Capillary Refill: Capillary refill takes less than 2 seconds.   Neurological:      Mental Status: She is alert and oriented to person, place, and time.   Psychiatric:         Behavior: Behavior normal.         Thought Content: Thought content normal.         Procedures           ED Course  ED Course as of 05/31/25 2014   Sat May 31, 2025   1216 ECG demonstrates normal sinus rhythm at rate of 91 bpm.  UT and QTc interval are normal as is QRS duration.  There are no acute ST-T wave changes [RA]      ED Course User Index  [RA] Oziel Griffin MD                                           Results for orders placed or performed during the hospital encounter of 05/31/25   ECG 12 Lead Syncope    Collection Time: 05/31/25 11:45 AM   Result Value Ref Range    QT Interval 370 ms    QTC Interval 455 ms   Urinalysis With Culture If Indicated - Urine, Clean Catch    Collection Time: 05/31/25 11:47 AM    Specimen: Urine, Clean Catch   Result Value Ref Range    Color, UA Red (A) Yellow, Straw    Appearance, UA Cloudy (A) Clear    pH, UA >=9.0 (H) 5.0 - 8.0    Specific Gravity, UA 1.012 1.005 - 1.030    Glucose, UA Negative Negative    Ketones, UA Negative Negative    Bilirubin, UA Negative Negative    Blood, UA Large (3+) (A) Negative    Protein, UA Trace (A) Negative    Leuk Esterase, UA Trace (A) Negative    Nitrite, UA Negative Negative    Urobilinogen, UA 1.0 E.U./dL 0.2 - 1.0 E.U./dL   Urinalysis, Microscopic Only - Urine, Clean  Catch    Collection Time: 05/31/25 11:47 AM    Specimen: Urine, Clean Catch   Result Value Ref Range    RBC, UA Too Numerous to Count (A) None Seen, 0-2 /HPF    WBC, UA 0-2 None Seen, 0-2 /HPF    Bacteria, UA None Seen None Seen /HPF    Squamous Epithelial Cells, UA 3-6 (A) None Seen, 0-2 /HPF    Hyaline Casts, UA None Seen None Seen /LPF    Methodology Automated Microscopy    Comprehensive Metabolic Panel    Collection Time: 05/31/25 12:10 PM    Specimen: Arm, Left; Blood   Result Value Ref Range    Glucose 99 65 - 99 mg/dL    BUN 6.8 6.0 - 20.0 mg/dL    Creatinine 0.53 (L) 0.57 - 1.00 mg/dL    Sodium 140 136 - 145 mmol/L    Potassium 4.0 3.5 - 5.2 mmol/L    Chloride 104 98 - 107 mmol/L    CO2 26.0 22.0 - 29.0 mmol/L    Calcium 9.1 8.6 - 10.5 mg/dL    Total Protein 7.0 6.0 - 8.5 g/dL    Albumin 4.4 3.5 - 5.2 g/dL    ALT (SGPT) 28 1 - 33 U/L    AST (SGOT) 28 1 - 32 U/L    Alkaline Phosphatase 92 39 - 117 U/L    Total Bilirubin 0.5 0.0 - 1.2 mg/dL    Globulin 2.6 gm/dL    A/G Ratio 1.7 g/dL    BUN/Creatinine Ratio 12.8 7.0 - 25.0    Anion Gap 10.0 5.0 - 15.0 mmol/L    eGFR 112.8 >60.0 mL/min/1.73   High Sensitivity Troponin T    Collection Time: 05/31/25 12:10 PM    Specimen: Arm, Left; Blood   Result Value Ref Range    HS Troponin T <6 <14 ng/L   CBC Auto Differential    Collection Time: 05/31/25 12:10 PM    Specimen: Arm, Left; Blood   Result Value Ref Range    WBC 6.35 3.40 - 10.80 10*3/mm3    RBC 5.02 3.77 - 5.28 10*6/mm3    Hemoglobin 15.1 12.0 - 15.9 g/dL    Hematocrit 46.3 34.0 - 46.6 %    MCV 92.2 79.0 - 97.0 fL    MCH 30.1 26.6 - 33.0 pg    MCHC 32.6 31.5 - 35.7 g/dL    RDW 13.0 12.3 - 15.4 %    RDW-SD 43.8 37.0 - 54.0 fl    MPV 10.0 6.0 - 12.0 fL    Platelets 191 140 - 450 10*3/mm3    Neutrophil % 65.4 42.7 - 76.0 %    Lymphocyte % 28.7 19.6 - 45.3 %    Monocyte % 4.9 (L) 5.0 - 12.0 %    Eosinophil % 0.5 0.3 - 6.2 %    Basophil % 0.3 0.0 - 1.5 %    Immature Grans % 0.2 0.0 - 0.5 %    Neutrophils, Absolute  4.16 1.70 - 7.00 10*3/mm3    Lymphocytes, Absolute 1.82 0.70 - 3.10 10*3/mm3    Monocytes, Absolute 0.31 0.10 - 0.90 10*3/mm3    Eosinophils, Absolute 0.03 0.00 - 0.40 10*3/mm3    Basophils, Absolute 0.02 0.00 - 0.20 10*3/mm3    Immature Grans, Absolute 0.01 0.00 - 0.05 10*3/mm3    nRBC 0.0 0.0 - 0.2 /100 WBC     CT Abdomen Pelvis Stone Protocol   Final Result       1.  No CT evidence of an acute abdominal or pelvic abnormality.       2.  Large amount of formed stool and gas present throughout the colon,   unchanged.        3.  Focal bronchiectasis in the lung, right lower lobe at the edge of   the field-of-view containing a small amount of fluid or soft tissue   attenuation.  Clinically appropriate, dedicated chest CT could be   considered.       This report was finalized on 5/31/2025 12:28 PM by Mary Douglas MD.                        Medical Decision Making  MDM:    Escalation of care including admission/observation considered    - Discussions of management with other providers:  None    - Discussed/reviewed with Radiology regarding test interpretation    - Independent interpretation: None    - Additional patient history obtained from: None    - Review of external non-ED record (if available):  Prior Inpt record, Office record, Outpt record, Prior Outpt labs, PCP record, Outside ED record, Other    - Chronic conditions affecting care: See HPI and medical Hx.    - Social Determinants of health significantly affecting care:  None        Medical Decision Making Discussion:    Patient is a 50-year-old female presents today complaining of abdominal pain.  Patient also reports of blood in urine.  Patient reports this has been an ongoing problem.  Patient also reports some nausea.  Patient denies alleviating or worsening factors.  Patient denies trying any interventions.      The patient has been given very strict return precautions to return to the emergency department should there be any acute change or worsening  of their condition.  I have explained my findings and the patient has expressed understanding to me.  I explained that the work-up performed in the ED has been based on the specific complaint and concern, as the nature of emergency medicine is complaint driven and they understand that new symptoms may arise.  I have told them that, should there be any new symptoms, worsening or changing symptoms, a new work-up may be indicated that they are encouraged to return to the emergency department or promptly contact their primary care physician. We have employed a shared decision-making process as the discussion of their disposition.  The patient has been educated as to the nature of the visit, the tests and work-up performed and the findings from today's visit. At this time, there does not appear to be any acute emergent process that necessitates admission to the hospital, however, the patient understands that this can change unexpectedly. At this time, the patient is stable for discharge home and agrees to follow-up with her primary care physician in the next 24 to 48 hours or earlier should they be able to obtain an appointment.    The patient was counseled regarding diagnostic results and treatment plan and patient has indicated understanding of these instructions.      Problems Addressed:  Generalized abdominal pain: complicated acute illness or injury    Amount and/or Complexity of Data Reviewed  Labs: ordered. Decision-making details documented in ED Course.  Radiology: ordered. Decision-making details documented in ED Course.  ECG/medicine tests: ordered. Decision-making details documented in ED Course.    Risk  Prescription drug management.        Final diagnoses:   Generalized abdominal pain       ED Disposition  ED Disposition       ED Disposition   Discharge    Condition   Stable    Comment   --               Mabel Patterson, APRN  [address]  [facility]  [place] [address]  [phone]    Schedule an  appointment as soon as possible for a visit   For further evaluation         Medication List        New Prescriptions      mupirocin 2 % ointment  Commonly known as: BACTROBAN  Apply 1 Application topically to the appropriate area as directed 3 (Three) Times a Day.               Where to Get Your Medications        These medications were sent to [place], [place] - [address] [place] [address] [phone] [facility] [phone]   [address] [place] [id number]      Phone: [phone]   mupirocin 2 % ointment            Ja Suarez, APRN  05/31/25 2014

## 2025-06-01 LAB
QT INTERVAL: 370 MS
QTC INTERVAL: 455 MS

## 2025-06-05 ENCOUNTER — OFFICE VISIT (OUTPATIENT)
Dept: UROLOGY | Facility: CLINIC | Age: 50
End: 2025-06-05
Payer: COMMERCIAL

## 2025-06-05 VITALS
HEIGHT: 68 IN | BODY MASS INDEX: 16.03 KG/M2 | WEIGHT: 105.8 LBS | SYSTOLIC BLOOD PRESSURE: 124 MMHG | DIASTOLIC BLOOD PRESSURE: 74 MMHG

## 2025-06-05 DIAGNOSIS — R35.0 FREQUENCY OF MICTURITION: ICD-10-CM

## 2025-06-05 DIAGNOSIS — N39.0 URINARY TRACT INFECTION WITHOUT HEMATURIA, SITE UNSPECIFIED: ICD-10-CM

## 2025-06-05 DIAGNOSIS — F31.9 BIPOLAR 1 DISORDER: ICD-10-CM

## 2025-06-05 DIAGNOSIS — N35.028 OTHER POST-TRAUMATIC URETHRAL STRICTURE, FEMALE: ICD-10-CM

## 2025-06-05 DIAGNOSIS — N35.021 URETHRAL STRICTURE DUE TO CHILDBIRTH: Primary | ICD-10-CM

## 2025-06-05 DIAGNOSIS — N23 RENAL COLIC: ICD-10-CM

## 2025-06-05 RX ORDER — OXYCODONE AND ACETAMINOPHEN 10; 325 MG/1; MG/1
1 TABLET ORAL EVERY 6 HOURS PRN
Qty: 20 TABLET | Refills: 0 | Status: SHIPPED | OUTPATIENT
Start: 2025-06-05

## 2025-06-05 RX ORDER — MEGESTROL ACETATE 20 MG/1
20 TABLET ORAL DAILY
Qty: 30 TABLET | Refills: 3 | Status: SHIPPED | OUTPATIENT
Start: 2025-06-05

## 2025-06-05 RX ORDER — PROMETHAZINE HYDROCHLORIDE 25 MG/1
25 TABLET ORAL EVERY 6 HOURS PRN
Qty: 21 TABLET | Refills: 5 | Status: SHIPPED | OUTPATIENT
Start: 2025-06-05

## 2025-06-05 RX ORDER — GENTAMICIN 40 MG/ML
80 INJECTION, SOLUTION INTRAMUSCULAR; INTRAVENOUS ONCE
Status: COMPLETED | OUTPATIENT
Start: 2025-06-05 | End: 2025-06-05

## 2025-06-05 RX ADMIN — GENTAMICIN 80 MG: 40 INJECTION, SOLUTION INTRAMUSCULAR; INTRAVENOUS at 08:21

## 2025-06-05 NOTE — PROGRESS NOTES
Chief Complaint:      Chief Complaint   Patient presents with    Other post-traumatic urethral stricture, female     Dilation follow up        HPI:   50 y.o. female here for urethral dilation secondary to posttraumatic stricture.    Past Medical History:     Past Medical History:   Diagnosis Date    Hoyos's disease     Bipolar 1 disorder     Constipation     DDD (degenerative disc disease), cervical 05/29/2017    Fibromyalgia     IBS (irritable bowel syndrome)     Meningioma     Migraine     PONV (postoperative nausea and vomiting)     PTSD (post-traumatic stress disorder)     Rectal bleeding        Current Meds:     Current Outpatient Medications   Medication Sig Dispense Refill    albuterol (PROVENTIL HFA;VENTOLIN HFA) 108 (90 Base) MCG/ACT inhaler Inhale 2 puffs 2 (Two) Times a Day.      Azelastine HCl 137 MCG/SPRAY solution Administer 1 spray into the nostril(s) as directed by provider As Needed (Allergies).      bacitracin 500 UNIT/GM ointment Apply 1 Application topically to the appropriate area as directed 2 (Two) Times a Day. 14 g 0    busPIRone (BUSPAR) 15 MG tablet Take 1 tablet by mouth 3 (Three) Times a Day.      diclofenac (VOLTAREN) 1 % gel gel       divalproex (DEPAKOTE) 500 MG DR tablet Take 1 tablet by mouth 3 (Three) Times a Day. 90 tablet 2    docusate sodium (COLACE) 100 MG capsule Take 1 capsule by mouth 2 (Two) Times a Day. 20 capsule 0    fluticasone (VERAMYST) 27.5 MCG/SPRAY nasal spray Administer 2 sprays into the nostril(s) as directed by provider Daily.      HYDROcodone-acetaminophen (NORCO)  MG per tablet Take 1 tablet by mouth Every 8 (Eight) Hours As Needed for Moderate Pain or Severe Pain for up to 12 doses. 12 tablet 0    megestrol (MEGACE) 20 MG tablet Take 1 tablet by mouth Daily. 30 tablet 3    methylPREDNISolone (MEDROL) 4 MG dose pack Take as directed on package instructions. 21 tablet 0    metroNIDAZOLE (FLAGYL) 500 MG tablet Take 1 tablet by mouth 3 (Three) Times a Day.  30 tablet 0    montelukast (SINGULAIR) 10 MG tablet Take 1 tablet by mouth Every Night.      mupirocin (BACTROBAN) 2 % ointment Apply 1 Application topically to the appropriate area as directed 3 (Three) Times a Day. 22 g 0    naloxone (NARCAN) 4 MG/0.1ML nasal spray Call 911. Don't prime. [place] in 1 nostril for overdose. Repeat in 2-3 minutes in other nostril if no or minimal breathing/responsiveness. 2 each 0    nitrofurantoin, macrocrystal-monohydrate, (MACROBID) 100 MG capsule Take 1 capsule by mouth 2 (Two) Times a Day. 13 capsule 0    ondansetron ODT (ZOFRAN-ODT) 4 MG disintegrating tablet Place 2 tablets on the tongue Every 8 (Eight) Hours As Needed for Nausea or Vomiting. 30 tablet 0    oxyCODONE-acetaminophen (Percocet)  MG per tablet Take 1 tablet by mouth Every 6 (Six) Hours As Needed for Moderate Pain. 20 tablet 0    pantoprazole (PROTONIX) 40 MG EC tablet Take 1 tablet by mouth 2 (Two) Times a Day As Needed.      phenazopyridine (PYRIDIUM) 100 MG tablet Take 1 tablet by mouth 3 (Three) Times a Day As Needed for Bladder Spasms. 30 tablet 1    phenazopyridine (Pyridium) 200 MG tablet Take 1 tablet by mouth 3 (Three) Times a Day As Needed for Bladder Spasms. 20 tablet 0    polyethylene glycol (MIRALAX) 17 GM/SCOOP powder Take 17 g by mouth Daily. 225 g 0    potassium chloride (KLOR-CON M20) 20 MEQ CR tablet Take 1 tablet by mouth Daily. 10 tablet 0    promethazine (PHENERGAN) 25 MG tablet Take 1 tablet by mouth Every 6 (Six) Hours As Needed for Nausea or Vomiting. 21 tablet 5    promethazine (PHENERGAN) 25 MG tablet Take 1 tablet by mouth Every 6 (Six) Hours As Needed for Nausea or Vomiting. 28 tablet 4    sertraline (ZOLOFT) 100 MG tablet Take 1 tablet by mouth 2 (Two) Times a Day.      SUMAtriptan (IMITREX) 6 MG/0.5ML injection Inject prescribed dose at onset of headache. May repeat dose one time in 1 hour(s) if headache not relieved.      terazosin (HYTRIN) 2 MG capsule Take 1 capsule by mouth  Every Night for 14 days. 14 capsule 0    traMADol (ULTRAM) 50 MG tablet Take 1 tablet by mouth Every 8 (Eight) Hours As Needed for Moderate Pain. 12 tablet 0     No current facility-administered medications for this visit.        Allergies:      Allergies   Allergen Reactions    Ativan [Lorazepam] Hallucinations     confusion    Sulfa Antibiotics Shortness Of Breath and Swelling    Sulfa Antibiotics Anaphylaxis    Reglan [Metoclopramide] Angioedema    Compazine [Prochlorperazine Edisylate] Hives    Demerol [Meperidine] Hives    Droperidol Itching    Metoclopramide Swelling    Toradol [Ketorolac Tromethamine] Hives and Itching    Toradol [Ketorolac Tromethamine] Hives    Zosyn [Piperacillin Sod-Tazobactam So] Hives        Past Surgical History:     Past Surgical History:   Procedure Laterality Date    ANAL SCOPE N/A 7/28/2016    Procedure: ANAL SCOPE;  Surgeon: Kael Lopez MD;  Location: [facility] OR;  Service:     APPENDECTOMY      COLONOSCOPY N/A 6/30/2016    Procedure: COLONOSCOPY  CPTCODE:84161;  Surgeon: Jose Antonio Belle III, MD;  Location: [facility] OR;  Service:     COLONOSCOPY N/A 7/7/2016    Procedure: COLONOSCOPY (02602) CPT;  Surgeon: Jose Antonio Belle III, MD;  Location: [facility] OR;  Service:     CYSTOSCOPY RETROGRADE PYELOGRAM Bilateral 4/28/2017    Procedure: CYSTOSCOPY RETROGRADE PYELOGRAM;  Surgeon: Mu Blunt MD;  Location: [facility] OR;  Service:     ENDOSCOPY N/A 6/30/2016    Procedure: ESOPHAGOGASTRODUODENOSCOPY WITH BIOPSY  CPTCODE:17511;  Surgeon: Jose Antonio Belle III, MD;  Location: [facility] OR;  Service:     HEMORRHOIDECTOMY N/A 7/28/2016    Procedure: HEMORRHOID STAPLING;  Surgeon: Kael Lopez MD;  Location: [facility] OR;  Service:     HYSTERECTOMY      KNEE SURGERY      LAPAROSCOPIC SALPINGOOPHERECTOMY      PORTACATH PLACEMENT N/A 7/28/2017    Procedure: INSERTION OF PORTACATH;  Surgeon: Celso Arredondo MD;  Location: [facility] OR;  Service:     SHOULDER SURGERY      3 times        Social History:     Social History     Socioeconomic History    Marital status:    Tobacco Use    Smoking status: Former     Current packs/day: 0.00     Average packs/day: 1 pack/day for 20.0 years (20.0 ttl pk-yrs)     Types: Cigarettes     Start date: 1995     Quit date: 2015     Years since quittin.6     Passive exposure: Past    Smokeless tobacco: Never   Vaping Use    Vaping status: Never Used   Substance and Sexual Activity    Alcohol use: No    Drug use: Yes     Types: Marijuana     Comment: for pain    Sexual activity: Defer     Birth control/protection: Surgical       Family History:     Family History   Problem Relation Age of Onset    Crohn's disease Other     Hypertension Other     Diabetes Other     Irritable bowel syndrome Other     No Known Problems Father     No Known Problems Mother        Review of Systems:     Review of Systems   Constitutional: Negative.  Negative for activity change, appetite change, chills, diaphoresis, fatigue and unexpected weight change.   HENT:  Negative for congestion, dental problem, drooling, ear discharge, ear pain, facial swelling, hearing loss, mouth sores, nosebleeds, postnasal drip, rhinorrhea, sinus pressure, sneezing, sore throat, tinnitus, trouble swallowing and voice change.    Eyes: Negative.  Negative for photophobia, pain, discharge, redness, itching and visual disturbance.   Respiratory: Negative.  Negative for apnea, cough, choking, chest tightness, shortness of breath, wheezing and stridor.    Cardiovascular: Negative.  Negative for chest pain, palpitations and leg swelling.   Gastrointestinal: Negative.  Negative for abdominal distention, abdominal pain, anal bleeding, blood in stool, constipation, diarrhea, nausea, rectal pain and vomiting.   Endocrine: Negative.  Negative for cold intolerance, heat intolerance, polydipsia, polyphagia and polyuria.   Genitourinary:  Positive for difficulty urinating.   Musculoskeletal:  Negative.  Negative for arthralgias, back pain, gait problem, joint swelling, myalgias, neck pain and neck stiffness.   Skin: Negative.  Negative for color change, pallor, rash and wound.   Allergic/Immunologic: Negative.  Negative for environmental allergies, food allergies and immunocompromised state.   Neurological: Negative.  Negative for dizziness, tremors, seizures, syncope, facial asymmetry, speech difficulty, weakness, light-headedness, numbness and headaches.   Hematological: Negative.  Negative for adenopathy. Does not bruise/bleed easily.   Psychiatric/Behavioral:  Negative for agitation, behavioral problems, confusion, decreased concentration, dysphoric mood, hallucinations, self-injury, sleep disturbance and suicidal ideas. The patient is not nervous/anxious and is not hyperactive.    All other systems reviewed and are negative.      Physical Exam:     Physical Exam  Constitutional:       Appearance: She is well-developed.   HENT:      Head: Normocephalic and atraumatic.      Right Ear: External ear normal.      Left Ear: External ear normal.   Eyes:      Conjunctiva/sclera: Conjunctivae normal.      Pupils: Pupils are equal, round, and reactive to light.   Cardiovascular:      Rate and Rhythm: Normal rate and regular rhythm.      Heart sounds: Normal heart sounds.   Pulmonary:      Effort: Pulmonary effort is normal.      Breath sounds: Normal breath sounds.   Abdominal:      General: Bowel sounds are normal. There is no distension.      Palpations: Abdomen is soft. There is no mass.      Tenderness: There is no abdominal tenderness. There is no guarding or rebound.   Genitourinary:     General: Normal vulva.      Vagina: No vaginal discharge.   Musculoskeletal:         General: Normal range of motion.   Skin:     General: Skin is warm and dry.   Neurological:      Mental Status: She is alert.      Deep Tendon Reflexes: Reflexes are normal and symmetric.   Psychiatric:         Behavior: Behavior  normal.         Thought Content: Thought content normal.         Judgment: Judgment normal.         I have reviewed the following portions of the patient's history: Allergies, current medications, past family history, past medical history, past social history, past surgical history, problem list, and ROS and confirm it is accurate.    Recent Image (CT and/or KUB):      CT Abdomen and Pelvis: No results found for this or any previous visit.       CT Stone Protocol: Results for orders placed during the hospital encounter of 05/31/25    CT Abdomen Pelvis Stone Protocol    Narrative  Examination: CT abdomen pelvis without    Procedure: Contiguous axial images were obtained through the abdomen and  pelvis without the use of intravenous contrast.  Sagittal and coronal  reformations are supplied.    Comparison: 5/28/2025    Findings: Focal bronchial dilatation noted in the right lower lobe with  soft tissue or fluid in the lumen     .    Evaluation of solid organs is limited without the use of intravenous  contrast.  Allowing for this, hepatomegaly is present measuring 16 cm.  The gallbladder, pancreas, spleen, under distended stomach, adrenals,  aorta and IVC are morphologically unremarkable.    Moderate atherosclerotic disease of the distal abdominal aorta.    The kidneys are bilaterally symmetric.  No obstructing renal calculus or  hydronephrosis.  Probable right lower pole cyst measuring 14 mm present.  No perinephric stranding.    An extremely large amount of formed stool and gas is present throughout  the colon.  Dilated loops of bowel, extraluminal gas or adenopathy.    No inflammatory change in the right lower quadrant.    No free fluid in the pelvis.  Urinary bladder distends normally.  Uterus  is not identified.  Suspected postsurgical change of the anorectal  junction no surrounding inflammatory change.        In bone windows, moderate osseous demineralization.  No acute osseous  abnormality.  Mild compression  deformity of the superior endplate of L2,  unchanged.    Impression  1.  No CT evidence of an acute abdominal or pelvic abnormality.    2.  Large amount of formed stool and gas present throughout the colon,  unchanged.    3.  Focal bronchiectasis in the lung, right lower lobe at the edge of  the field-of-view containing a small amount of fluid or soft tissue  attenuation.  Clinically appropriate, dedicated chest CT could be  considered.    This report was finalized on 5/31/2025 12:28 PM by Mary Douglas MD.       KUB: Results for orders placed during the hospital encounter of 01/10/23    XR Abdomen KUB    Narrative  EXAM:  XR Abdomen, 1 View    EXAM DATE:  1/10/2023 12:05 PM    CLINICAL HISTORY:  flank pain    TECHNIQUE:  Frontal supine view of the abdomen/pelvis.    COMPARISON:  10/01/2022    FINDINGS:  Gastrointestinal tract:  Unremarkable.  No dilation.  Organs:  No stones identified along the expected course of ureters.  Bones/joints:  Unremarkable.  Soft tissues:  Surgical staples in the lower pelvis.  Vasculature:  Stable phleboliths lower pelvis.    Impression  1.  No acute findings radiographically evident.  2.  No radiographic evidence of renal or ureteral stones.    This report was finalized on 1/10/2023 12:47 PM by Dr. Ankit Naik MD.       Labs (past 3 months):      Admission on 05/31/2025, Discharged on 05/31/2025   Component Date Value Ref Range Status    Glucose 05/31/2025 99  65 - 99 mg/dL Final    BUN 05/31/2025 6.8  6.0 - 20.0 mg/dL Final    Creatinine 05/31/2025 0.53 (L)  0.57 - 1.00 mg/dL Final    Sodium 05/31/2025 140  136 - 145 mmol/L Final    Potassium 05/31/2025 4.0  3.5 - 5.2 mmol/L Final    Chloride 05/31/2025 104  98 - 107 mmol/L Final    CO2 05/31/2025 26.0  22.0 - 29.0 mmol/L Final    Calcium 05/31/2025 9.1  8.6 - 10.5 mg/dL Final    Total Protein 05/31/2025 7.0  6.0 - 8.5 g/dL Final    Albumin 05/31/2025 4.4  3.5 - 5.2 g/dL Final    ALT (SGPT) 05/31/2025 28  1 - 33 U/L Final     AST (SGOT) 05/31/2025 28  1 - 32 U/L Final    Alkaline Phosphatase 05/31/2025 92  39 - 117 U/L Final    Total Bilirubin 05/31/2025 0.5  0.0 - 1.2 mg/dL Final    Globulin 05/31/2025 2.6  gm/dL Final    A/G Ratio 05/31/2025 1.7  g/dL Final    BUN/Creatinine Ratio 05/31/2025 12.8  7.0 - 25.0 Final    Anion Gap 05/31/2025 10.0  5.0 - 15.0 mmol/L Final    eGFR 05/31/2025 112.8  >60.0 mL/min/1.73 Final    Color, UA 05/31/2025 Red (A)  Yellow, Straw Final    Appearance, UA 05/31/2025 Cloudy (A)  Clear Final    pH, UA 05/31/2025 >=9.0 (H)  5.0 - 8.0 Final    Specific Gravity, UA 05/31/2025 1.012  1.005 - 1.030 Final    Glucose, UA 05/31/2025 Negative  Negative Final    Ketones, UA 05/31/2025 Negative  Negative Final    Bilirubin, UA 05/31/2025 Negative  Negative Final    Blood, UA 05/31/2025 Large (3+) (A)  Negative Final    Protein, UA 05/31/2025 Trace (A)  Negative Final    Leuk Esterase, UA 05/31/2025 Trace (A)  Negative Final    Nitrite, UA 05/31/2025 Negative  Negative Final    Urobilinogen, UA 05/31/2025 1.0 E.U./dL  0.2 - 1.0 E.U./dL Final    QT Interval 05/31/2025 370  ms Final    QTC Interval 05/31/2025 455  ms Final    HS Troponin T 05/31/2025 <6  <14 ng/L Final    WBC 05/31/2025 6.35  3.40 - 10.80 10*3/mm3 Final    RBC 05/31/2025 5.02  3.77 - 5.28 10*6/mm3 Final    Hemoglobin 05/31/2025 15.1  12.0 - 15.9 g/dL Final    Hematocrit 05/31/2025 46.3  34.0 - 46.6 % Final    MCV 05/31/2025 92.2  79.0 - 97.0 fL Final    MCH 05/31/2025 30.1  26.6 - 33.0 pg Final    MCHC 05/31/2025 32.6  31.5 - 35.7 g/dL Final    RDW 05/31/2025 13.0  12.3 - 15.4 % Final    RDW-SD 05/31/2025 43.8  37.0 - 54.0 fl Final    MPV 05/31/2025 10.0  6.0 - 12.0 fL Final    Platelets 05/31/2025 191  140 - 450 10*3/mm3 Final    Neutrophil % 05/31/2025 65.4  42.7 - 76.0 % Final    Lymphocyte % 05/31/2025 28.7  19.6 - 45.3 % Final    Monocyte % 05/31/2025 4.9 (L)  5.0 - 12.0 % Final    Eosinophil % 05/31/2025 0.5  0.3 - 6.2 % Final    Basophil %  05/31/2025 0.3  0.0 - 1.5 % Final    Immature Grans % 05/31/2025 0.2  0.0 - 0.5 % Final    Neutrophils, Absolute 05/31/2025 4.16  1.70 - 7.00 10*3/mm3 Final    Lymphocytes, Absolute 05/31/2025 1.82  0.70 - 3.10 10*3/mm3 Final    Monocytes, Absolute 05/31/2025 0.31  0.10 - 0.90 10*3/mm3 Final    Eosinophils, Absolute 05/31/2025 0.03  0.00 - 0.40 10*3/mm3 Final    Basophils, Absolute 05/31/2025 0.02  0.00 - 0.20 10*3/mm3 Final    Immature Grans, Absolute 05/31/2025 0.01  0.00 - 0.05 10*3/mm3 Final    nRBC 05/31/2025 0.0  0.0 - 0.2 /100 WBC Final    RBC, UA 05/31/2025 Too Numerous to Count (A)  None Seen, 0-2 /HPF Final    WBC, UA 05/31/2025 0-2  None Seen, 0-2 /HPF Final    Urine culture not indicated.    Bacteria, UA 05/31/2025 None Seen  None Seen /HPF Final    Squamous Epithelial Cells, UA 05/31/2025 3-6 (A)  None Seen, 0-2 /HPF Final    Hyaline Casts, UA 05/31/2025 None Seen  None Seen /LPF Final    Methodology 05/31/2025 Automated Microscopy   Final   Admission on 05/28/2025, Discharged on 05/28/2025   Component Date Value Ref Range Status    Glucose 05/28/2025 116 (H)  65 - 99 mg/dL Final    BUN 05/28/2025 10.6  6.0 - 20.0 mg/dL Final    Creatinine 05/28/2025 0.71  0.57 - 1.00 mg/dL Final    Sodium 05/28/2025 140  136 - 145 mmol/L Final    Potassium 05/28/2025 4.0  3.5 - 5.2 mmol/L Final    Chloride 05/28/2025 101  98 - 107 mmol/L Final    CO2 05/28/2025 28.4  22.0 - 29.0 mmol/L Final    Calcium 05/28/2025 9.4  8.6 - 10.5 mg/dL Final    Total Protein 05/28/2025 7.7  6.0 - 8.5 g/dL Final    Albumin 05/28/2025 4.7  3.5 - 5.2 g/dL Final    ALT (SGPT) 05/28/2025 33  1 - 33 U/L Final    AST (SGOT) 05/28/2025 32  1 - 32 U/L Final    Alkaline Phosphatase 05/28/2025 96  39 - 117 U/L Final    Total Bilirubin 05/28/2025 0.6  0.0 - 1.2 mg/dL Final    Globulin 05/28/2025 3.0  gm/dL Final    A/G Ratio 05/28/2025 1.6  g/dL Final    BUN/Creatinine Ratio 05/28/2025 14.9  7.0 - 25.0 Final    Anion Gap 05/28/2025 10.6  5.0 - 15.0  mmol/L Final    eGFR 05/28/2025 103.7  >60.0 mL/min/1.73 Final    Lipase 05/28/2025 31  13 - 60 U/L Final    Color, UA 05/28/2025 Fe (A)  Yellow, Straw Final    Appearance, UA 05/28/2025 Cloudy (A)  Clear Final    pH, UA 05/28/2025 6.0  5.0 - 8.0 Final    Specific Gravity, UA 05/28/2025 1.009  1.005 - 1.030 Final    Glucose, UA 05/28/2025 Negative  Negative Final    Ketones, UA 05/28/2025 Negative  Negative Final    Bilirubin, UA 05/28/2025 Negative  Negative Final    Blood, UA 05/28/2025 Large (3+) (A)  Negative Final    Protein, UA 05/28/2025 30 mg/dL (1+) (A)  Negative Final    Leuk Esterase, UA 05/28/2025 Trace (A)  Negative Final    Nitrite, UA 05/28/2025 Negative  Negative Final    Urobilinogen, UA 05/28/2025 1.0 E.U./dL  0.2 - 1.0 E.U./dL Final    Lactate 05/28/2025 1.2  0.5 - 2.0 mmol/L Final    Extra Tube 05/28/2025 Hold for add-ons.   Final    Auto resulted.    Extra Tube 05/28/2025 hold for add-on   Final    Auto resulted    Extra Tube 05/28/2025 Hold for add-ons.   Final    Auto resulted    WBC 05/28/2025 5.70  3.40 - 10.80 10*3/mm3 Final    RBC 05/28/2025 5.63 (H)  3.77 - 5.28 10*6/mm3 Final    Hemoglobin 05/28/2025 17.1 (H)  12.0 - 15.9 g/dL Final    Hematocrit 05/28/2025 53.7 (H)  34.0 - 46.6 % Final    MCV 05/28/2025 95.4  79.0 - 97.0 fL Final    MCH 05/28/2025 30.4  26.6 - 33.0 pg Final    MCHC 05/28/2025 31.8  31.5 - 35.7 g/dL Final    RDW 05/28/2025 13.2  12.3 - 15.4 % Final    RDW-SD 05/28/2025 46.5  37.0 - 54.0 fl Final    MPV 05/28/2025 10.0  6.0 - 12.0 fL Final    Platelets 05/28/2025 186  140 - 450 10*3/mm3 Final    Neutrophil % 05/28/2025 61.3  42.7 - 76.0 % Final    Lymphocyte % 05/28/2025 30.5  19.6 - 45.3 % Final    Monocyte % 05/28/2025 6.3  5.0 - 12.0 % Final    Eosinophil % 05/28/2025 1.1  0.3 - 6.2 % Final    Basophil % 05/28/2025 0.4  0.0 - 1.5 % Final    Immature Grans % 05/28/2025 0.4  0.0 - 0.5 % Final    Neutrophils, Absolute 05/28/2025 3.50  1.70 - 7.00 10*3/mm3 Final     Lymphocytes, Absolute 05/28/2025 1.74  0.70 - 3.10 10*3/mm3 Final    Monocytes, Absolute 05/28/2025 0.36  0.10 - 0.90 10*3/mm3 Final    Eosinophils, Absolute 05/28/2025 0.06  0.00 - 0.40 10*3/mm3 Final    Basophils, Absolute 05/28/2025 0.02  0.00 - 0.20 10*3/mm3 Final    Immature Grans, Absolute 05/28/2025 0.02  0.00 - 0.05 10*3/mm3 Final    nRBC 05/28/2025 0.0  0.0 - 0.2 /100 WBC Final    RBC, UA 05/28/2025 Too Numerous to Count (A)  None Seen, 0-2 /HPF Final    WBC, UA 05/28/2025 3-5 (A)  None Seen, 0-2 /HPF Final    Bacteria, UA 05/28/2025 None Seen  None Seen /HPF Final    Squamous Epithelial Cells, UA 05/28/2025 0-2  None Seen, 0-2 /HPF Final    Hyaline Casts, UA 05/28/2025 None Seen  None Seen /LPF Final    Methodology 05/28/2025 Automated Microscopy   Final    HCG, Urine QL 05/28/2025 Negative  Negative Final   Admission on 05/15/2025, Discharged on 05/15/2025   Component Date Value Ref Range Status    QT Interval 05/15/2025 402  ms Corrected    QTC Interval 05/15/2025 452  ms Corrected    Glucose 05/15/2025 131 (H)  65 - 99 mg/dL Final    BUN 05/15/2025 6  6 - 20 mg/dL Final    Creatinine 05/15/2025 0.55 (L)  0.57 - 1.00 mg/dL Final    Sodium 05/15/2025 140  136 - 145 mmol/L Final    Potassium 05/15/2025 3.9  3.5 - 5.2 mmol/L Final    Chloride 05/15/2025 104  98 - 107 mmol/L Final    CO2 05/15/2025 27.7  22.0 - 29.0 mmol/L Final    Calcium 05/15/2025 8.7  8.6 - 10.5 mg/dL Final    Total Protein 05/15/2025 6.6  6.0 - 8.5 g/dL Final    Albumin 05/15/2025 3.9  3.5 - 5.2 g/dL Final    ALT (SGPT) 05/15/2025 25  1 - 33 U/L Final    AST (SGOT) 05/15/2025 27  1 - 32 U/L Final    Alkaline Phosphatase 05/15/2025 85  39 - 117 U/L Final    Total Bilirubin 05/15/2025 0.5  0.0 - 1.2 mg/dL Final    Globulin 05/15/2025 2.7  gm/dL Final    A/G Ratio 05/15/2025 1.4  g/dL Final    BUN/Creatinine Ratio 05/15/2025 10.9  7.0 - 25.0 Final    Anion Gap 05/15/2025 8.3  5.0 - 15.0 mmol/L Final    eGFR 05/15/2025 111.8  >60.0  mL/min/1.73 Final    HS Troponin T 05/15/2025 7  <14 ng/L Final    Extra Tube 05/15/2025 Hold for add-ons.   Final    Auto resulted.    Extra Tube 05/15/2025 hold for add-on   Final    Auto resulted    Extra Tube 05/15/2025 Hold for add-ons.   Final    Auto resulted.    Extra Tube 05/15/2025 Hold for add-ons.   Final    Auto resulted    WBC 05/15/2025 6.53  3.40 - 10.80 10*3/mm3 Final    RBC 05/15/2025 4.61  3.77 - 5.28 10*6/mm3 Final    Hemoglobin 05/15/2025 14.2  12.0 - 15.9 g/dL Final    Hematocrit 05/15/2025 42.6  34.0 - 46.6 % Final    MCV 05/15/2025 92.4  79.0 - 97.0 fL Final    MCH 05/15/2025 30.8  26.6 - 33.0 pg Final    MCHC 05/15/2025 33.3  31.5 - 35.7 g/dL Final    RDW 05/15/2025 13.2  12.3 - 15.4 % Final    RDW-SD 05/15/2025 44.4  37.0 - 54.0 fl Final    MPV 05/15/2025 10.7  6.0 - 12.0 fL Final    Platelets 05/15/2025 148  140 - 450 10*3/mm3 Final    Neutrophil % 05/15/2025 71.6  42.7 - 76.0 % Final    Lymphocyte % 05/15/2025 20.7  19.6 - 45.3 % Final    Monocyte % 05/15/2025 5.4  5.0 - 12.0 % Final    Eosinophil % 05/15/2025 1.2  0.3 - 6.2 % Final    Basophil % 05/15/2025 0.3  0.0 - 1.5 % Final    Immature Grans % 05/15/2025 0.8 (H)  0.0 - 0.5 % Final    Neutrophils, Absolute 05/15/2025 4.68  1.70 - 7.00 10*3/mm3 Final    Lymphocytes, Absolute 05/15/2025 1.35  0.70 - 3.10 10*3/mm3 Final    Monocytes, Absolute 05/15/2025 0.35  0.10 - 0.90 10*3/mm3 Final    Eosinophils, Absolute 05/15/2025 0.08  0.00 - 0.40 10*3/mm3 Final    Basophils, Absolute 05/15/2025 0.02  0.00 - 0.20 10*3/mm3 Final    Immature Grans, Absolute 05/15/2025 0.05  0.00 - 0.05 10*3/mm3 Final    nRBC 05/15/2025 0.0  0.0 - 0.2 /100 WBC Final    Protime 05/15/2025 13.6  11.6 - 15.1 Seconds Final    INR 05/15/2025 1.03  0.90 - 1.10 Final    PTT 05/15/2025 83.2 (H)  24.5 - 35.9 seconds Final    Creatine Kinase 05/15/2025 34  20 - 180 U/L Final    Magnesium 05/15/2025 1.8  1.6 - 2.6 mg/dL Final    Valproic Acid 05/15/2025 <2.8 (L)  50.0 - 125.0  mcg/mL Final    HS Troponin T 05/15/2025 <6  <14 ng/L Final    Troponin T Numeric Delta 05/15/2025    Final    Unable to calculate.    D-Dimer, Quantitative 05/15/2025 0.63 (H)  0.00 - 0.50 MCGFEU/mL Final   Admission on 04/17/2025, Discharged on 04/17/2025   Component Date Value Ref Range Status    Color, UA 04/17/2025 Yellow  Yellow, Straw Final    Appearance, UA 04/17/2025 Clear  Clear Final    pH, UA 04/17/2025 5.5  5.0 - 8.0 Final    Specific Gravity, UA 04/17/2025 1.022  1.005 - 1.030 Final    Glucose, UA 04/17/2025 Negative  Negative Final    Ketones, UA 04/17/2025 Negative  Negative Final    Bilirubin, UA 04/17/2025 Negative  Negative Final    Blood, UA 04/17/2025 Moderate (2+) (A)  Negative Final    Protein, UA 04/17/2025 Trace (A)  Negative Final    Leuk Esterase, UA 04/17/2025 Trace (A)  Negative Final    Nitrite, UA 04/17/2025 Negative  Negative Final    Urobilinogen, UA 04/17/2025 1.0 E.U./dL  0.2 - 1.0 E.U./dL Final    RBC, UA 04/17/2025 Too Numerous to Count (A)  None Seen, 0-2 /HPF Final    WBC, UA 04/17/2025 3-5 (A)  None Seen, 0-2 /HPF Final    Urine culture not indicated.    Bacteria, UA 04/17/2025 None Seen  None Seen /HPF Final    Squamous Epithelial Cells, UA 04/17/2025 0-2  None Seen, 0-2 /HPF Final    Hyaline Casts, UA 04/17/2025 None Seen  None Seen /LPF Final    Methodology 04/17/2025 Automated Microscopy   Final        Procedure:   Urethral dilation-after an appropriate informed consent, the patient was brought to the procedure suite.  The urethra was gently anesthetized with 10 mL of 2% viscous Xylocaine jelly.  After an adequate period of topical anesthesia I went ahead and  dilated with [place] sounds from 16 to 30 [place] sequentially without complication. The patient was given gentamicin as prophylaxis with 80 mg.    Assessment/Plan:   Urethral stricture-status post dilation  Narcotic pain medication-patient has significant acute pain that I believe would be an indication for the use  of narcotic pain medication.  I discussed the significant risks of pain medication and the fact that this will be a short only option and I will give her no more than a three-day supply of pain medication, I will not plan long-term medication, and that this will be sent to a pain clinic if it at all becomes necessary.  We discussed signing a pain medication agreement and the fact that we're going to run a state [place] review to be sure the patient is not getting pain medication from elsewhere.  If this is the case, we will not give pain medication as part of the patient's treatment plan of there being prescribed a controlled substance with potential for abuse.  This patient has been well aware of the appropriate dose of such medications including the risks for somnolence, limited ability to drive and/or safety and the significant potential for overdose.  It has been made clear that these medications are for the prescribed patient only without concomitant use of alcohol or other substance unless prescribed by the medical provider.  Has completed prescribing agreement detailing the terms of continue prescribing him a controlled substance including monitoring Luis Alberto reports, the possibility of urine drug screens, and pill counts.  The patient is aware that we review LUIS ALBERTO reports on a regular basis and scan them into the chart.  History and physical examination exhibited continued safe and appropriate use of controlled substances. We also discussed the fact that the new [place] legislation allows only a three-day prescription for pain medication.  In this situation he will be referred to a chronic pain clinic.          This document has been electronically signed by VERENICE FINK MD June 5, 2025 08:12 EDT    Dictated Utilizing Dragon Dictation: Part of this note may be an electronic transcription/translation of spoken language to printed text using the Dragon Dictation System.

## 2025-06-12 ENCOUNTER — APPOINTMENT (OUTPATIENT)
Dept: GENERAL RADIOLOGY | Facility: HOSPITAL | Age: 50
End: 2025-06-12
Payer: COMMERCIAL

## 2025-06-12 ENCOUNTER — APPOINTMENT (OUTPATIENT)
Dept: CT IMAGING | Facility: HOSPITAL | Age: 50
End: 2025-06-12
Payer: COMMERCIAL

## 2025-06-12 ENCOUNTER — HOSPITAL ENCOUNTER (EMERGENCY)
Facility: HOSPITAL | Age: 50
Discharge: HOME | End: 2025-06-13
Attending: STUDENT IN AN ORGANIZED HEALTH CARE EDUCATION/TRAINING PROGRAM
Payer: COMMERCIAL

## 2025-06-12 VITALS
TEMPERATURE: 98.2 F | HEART RATE: 98 BPM | SYSTOLIC BLOOD PRESSURE: 135 MMHG | DIASTOLIC BLOOD PRESSURE: 75 MMHG | OXYGEN SATURATION: 97 % | RESPIRATION RATE: 16 BRPM | WEIGHT: 110 LBS | HEIGHT: 68 IN | BODY MASS INDEX: 16.67 KG/M2

## 2025-06-12 DIAGNOSIS — S46.912A STRAIN OF LEFT SHOULDER, INITIAL ENCOUNTER: ICD-10-CM

## 2025-06-12 DIAGNOSIS — S22.050A CLOSED WEDGE COMPRESSION FRACTURE OF T5 VERTEBRA, INITIAL ENCOUNTER: Primary | ICD-10-CM

## 2025-06-12 PROCEDURE — 72125 CT NECK SPINE W/O DYE: CPT | Performed by: RADIOLOGY

## 2025-06-12 PROCEDURE — 63710000001 ONDANSETRON ODT 4 MG TABLET DISPERSIBLE: Performed by: PHYSICIAN ASSISTANT

## 2025-06-12 PROCEDURE — 73030 X-RAY EXAM OF SHOULDER: CPT | Performed by: RADIOLOGY

## 2025-06-12 PROCEDURE — 72125 CT NECK SPINE W/O DYE: CPT

## 2025-06-12 PROCEDURE — 73030 X-RAY EXAM OF SHOULDER: CPT

## 2025-06-12 PROCEDURE — 25010000002 HYDROMORPHONE 1 MG/ML SOLUTION: Performed by: STUDENT IN AN ORGANIZED HEALTH CARE EDUCATION/TRAINING PROGRAM

## 2025-06-12 PROCEDURE — 72128 CT CHEST SPINE W/O DYE: CPT

## 2025-06-12 PROCEDURE — 96372 THER/PROPH/DIAG INJ SC/IM: CPT

## 2025-06-12 PROCEDURE — 99284 EMERGENCY DEPT VISIT MOD MDM: CPT

## 2025-06-12 PROCEDURE — 72128 CT CHEST SPINE W/O DYE: CPT | Performed by: RADIOLOGY

## 2025-06-12 RX ORDER — ONDANSETRON 4 MG/1
4 TABLET, ORALLY DISINTEGRATING ORAL ONCE
Status: COMPLETED | OUTPATIENT
Start: 2025-06-12 | End: 2025-06-12

## 2025-06-12 RX ORDER — HYDROMORPHONE HYDROCHLORIDE 1 MG/ML
0.5 INJECTION, SOLUTION INTRAMUSCULAR; INTRAVENOUS; SUBCUTANEOUS ONCE
Status: COMPLETED | OUTPATIENT
Start: 2025-06-12 | End: 2025-06-12

## 2025-06-12 RX ORDER — OXYCODONE AND ACETAMINOPHEN 10; 325 MG/1; MG/1
1 TABLET ORAL ONCE
Refills: 0 | Status: COMPLETED | OUTPATIENT
Start: 2025-06-12 | End: 2025-06-12

## 2025-06-12 RX ADMIN — ONDANSETRON 4 MG: 4 TABLET, ORALLY DISINTEGRATING ORAL at 23:48

## 2025-06-12 RX ADMIN — OXYCODONE HYDROCHLORIDE AND ACETAMINOPHEN 1 TABLET: 10; 325 TABLET ORAL at 21:38

## 2025-06-12 RX ADMIN — HYDROMORPHONE HYDROCHLORIDE 0.5 MG: 1 INJECTION, SOLUTION INTRAMUSCULAR; INTRAVENOUS; SUBCUTANEOUS at 23:48

## 2025-06-13 RX ORDER — OXYCODONE AND ACETAMINOPHEN 5; 325 MG/1; MG/1
1 TABLET ORAL EVERY 6 HOURS PRN
Qty: 12 TABLET | Refills: 0 | Status: SHIPPED | OUTPATIENT
Start: 2025-06-13

## 2025-06-13 NOTE — ED PROVIDER NOTES
Subjective   History of Present Illness  This is a 50 year old female patient who presents to the ER with chief complaint of fall. PMH significant for depression and anxiety, GERD. The patient was in the shower earlier this evening when she experienced a mechanical fall and fell on her left shoulder. She has a history of an injury and surgical intervention to the left shoulder. She now has left shoulder pain, neck pain and mid back pain. She didn't injure her head and didn't experience syncope or LOC.       Review of Systems   Constitutional: Negative.  Negative for fever.   HENT: Negative.     Respiratory: Negative.     Cardiovascular: Negative.  Negative for chest pain.   Gastrointestinal: Negative.  Negative for abdominal pain.   Endocrine: Negative.    Genitourinary: Negative.  Negative for dysuria.   Musculoskeletal:  Positive for back pain. Negative for arthralgias, gait problem, joint swelling, myalgias, neck pain and neck stiffness.        Left shoulder injury    Skin: Negative.    Neurological: Negative.    Psychiatric/Behavioral: Negative.     All other systems reviewed and are negative.      Past Medical History:   Diagnosis Date    Hoyos's disease     Bipolar 1 disorder     Constipation     DDD (degenerative disc disease), cervical 05/29/2017    Fibromyalgia     IBS (irritable bowel syndrome)     Meningioma     Migraine     PONV (postoperative nausea and vomiting)     PTSD (post-traumatic stress disorder)     Rectal bleeding        Allergies   Allergen Reactions    Ativan [Lorazepam] Hallucinations     confusion    Sulfa Antibiotics Shortness Of Breath and Swelling    Sulfa Antibiotics Anaphylaxis    Reglan [Metoclopramide] Angioedema    Compazine [Prochlorperazine Edisylate] Hives    Demerol [Meperidine] Hives    Droperidol Itching    Metoclopramide Swelling    Toradol [Ketorolac Tromethamine] Hives and Itching    Toradol [Ketorolac Tromethamine] Hives    Zosyn [Piperacillin Sod-Tazobactam So] Hives        Past Surgical History:   Procedure Laterality Date    ANAL SCOPE N/A 2016    Procedure: ANAL SCOPE;  Surgeon: Kael Lopez MD;  Location: [facility] OR;  Service:     APPENDECTOMY      COLONOSCOPY N/A 2016    Procedure: COLONOSCOPY  CPTCODE:83399;  Surgeon: Jose Antonio Belle III, MD;  Location: [facility] OR;  Service:     COLONOSCOPY N/A 2016    Procedure: COLONOSCOPY (06100) CPT;  Surgeon: Jose Antonio Belle III, MD;  [facility] OR;  Service:     CYSTOSCOPY RETROGRADE PYELOGRAM Bilateral 2017    Procedure: CYSTOSCOPY RETROGRADE PYELOGRAM;  Surgeon: Mu Blunt MD;  [facility] OR;  Service:     ENDOSCOPY N/A 2016    Procedure: ESOPHAGOGASTRODUODENOSCOPY WITH BIOPSY  CPTCODE:83022;  Surgeon: Jose Antonio Belle III, MD;  Location: [facility] OR;  Service:     HEMORRHOIDECTOMY N/A 2016    Procedure: HEMORRHOID STAPLING;  Surgeon: Kael Lopez MD;  Location: [facility] OR;  Service:     HYSTERECTOMY      KNEE SURGERY      LAPAROSCOPIC SALPINGOOPHERECTOMY      PORTACATH PLACEMENT N/A 2017    Procedure: INSERTION OF PORTACATH;  Surgeon: Celso Arredondo MD;  Location: [facility] OR;  Service:     SHOULDER SURGERY      3 times       Family History   Problem Relation Age of Onset    Crohn's disease Other     Hypertension Other     Diabetes Other     Irritable bowel syndrome Other     No Known Problems Father     No Known Problems Mother        Social History     Socioeconomic History    Marital status:    Tobacco Use    Smoking status: Former     Current packs/day: 0.00     Average packs/day: 1 pack/day for 20.0 years (20.0 ttl pk-yrs)     Types: Cigarettes     Start date: 1995     Quit date: 2015     Years since quittin.6     Passive exposure: Past    Smokeless tobacco: Never   Vaping Use    Vaping status: Never Used   Substance and Sexual Activity    Alcohol use: No    Drug use: Yes     Types: Marijuana     Comment: for pain    Sexual activity:  Defer     Birth control/protection: Surgical           Objective   Physical Exam  Vitals and nursing note reviewed.   Constitutional:       General: She is not in acute distress.     Appearance: She is well-developed. She is not diaphoretic.   HENT:      Head: Normocephalic and atraumatic.      Right Ear: External ear normal.      Left Ear: External ear normal.      Nose: Nose normal.   Eyes:      Conjunctiva/sclera: Conjunctivae normal.      Pupils: Pupils are equal, round, and reactive to light.   Neck:      Vascular: No JVD.      Trachea: No tracheal deviation.   Cardiovascular:      Rate and Rhythm: Normal rate and regular rhythm.      Heart sounds: Normal heart sounds. No murmur heard.  Pulmonary:      Effort: Pulmonary effort is normal. No respiratory distress.      Breath sounds: Normal breath sounds. No wheezing.   Abdominal:      General: Bowel sounds are normal.      Palpations: Abdomen is soft.      Tenderness: There is no abdominal tenderness. There is no guarding.   Musculoskeletal:         General: Tenderness and signs of injury present. No deformity. Normal range of motion.      Cervical back: Normal range of motion and neck supple.      Comments: Skin about back, neck and left shoulder intact with no bruising or abrasion; tenderness to palpation noted; limited ROM; neurovascular status and sensation BUE intact.    Skin:     General: Skin is warm and dry.      Coloration: Skin is not pale.      Findings: No erythema or rash.   Neurological:      General: No focal deficit present.      Mental Status: She is alert and oriented to person, place, and time. Mental status is at baseline.      Cranial Nerves: No cranial nerve deficit.      Sensory: No sensory deficit.      Motor: No weakness.      Coordination: Coordination normal.      Gait: Gait normal.      Deep Tendon Reflexes: Reflexes normal.   Psychiatric:         Behavior: Behavior normal.         Thought Content: Thought content normal.          Procedures           ED Course  ED Course as of 06/13/25 0010   u Jun 12, 2025 2209 CT Thoracic Spine Without Contrast  IMPRESSION:  Impression:     Superior endplate compression fracture of T5 with approximate 20% height  loss.  No retropulsion or subluxation.     This report was finalized on 6/12/2025 10:06 PM by Celso Cardenas MD   [MM]   2209 CT Cervical Spine Without Contrast  IMPRESSION:  Impression:     No CT evidence of acute fracture or acute subluxation involving the  cervical spine     This report was finalized on 6/12/2025 10:04 PM by Celso Cardenas MD   [MM]   Fri Jun 13, 2025   0005 XR Shoulder 2+ View Left  IMPRESSION:     Intact left glenohumeral joint.  Intact left AC joint  Preserved subacromial space.  No lytic or blastic lesion.   No foreign body.     This report was finalized on 6/12/2025 11:57 PM by Oziel Diamond MD.   [MM]   0007 Patient diagnosed with T5 compression fracture. Will be d/c home in [place] with referral to ortho spine. Will return to ER if symptoms worsen.  [MM]      ED Course User Index  [MM] Anya Fuentes PA                                                       Medical Decision Making    This is a 50 year old female patient who presents to the ER with chief complaint of fall. PMH significant for depression and anxiety, GERD. The patient was in the shower earlier this evening when she experienced a mechanical fall and fell on her left shoulder. She has a history of an injury and surgical intervention to the left shoulder. She now has left shoulder pain, neck pain and mid back pain. She didn't injure her head and didn't experience syncope or LOC.       Problems Addressed:  Closed wedge compression fracture of T5 vertebra, initial encounter: complicated acute illness or injury  Strain of left shoulder, initial encounter: complicated acute illness or injury    Amount and/or Complexity of Data Reviewed  Radiology: ordered. Decision-making details documented in  ED Course.    Risk  Prescription drug management.        Final diagnoses:   Closed wedge compression fracture of T5 vertebra, initial encounter   Strain of left shoulder, initial encounter       ED Disposition  ED Disposition       ED Disposition   Discharge    Condition   Stable    Comment   --               Tae Valdes DO  [address] [place] [address] [phone]    In 2 days           Medication List      No changes were made to your prescriptions during this visit.            Anya Fuentes PA  06/13/25 0010

## 2025-06-17 ENCOUNTER — TRANSCRIBE ORDERS (OUTPATIENT)
Dept: ADMINISTRATIVE | Facility: HOSPITAL | Age: 50
End: 2025-06-17
Payer: COMMERCIAL

## 2025-06-17 DIAGNOSIS — Z12.31 ENCOUNTER FOR SCREENING MAMMOGRAM FOR MALIGNANT NEOPLASM OF BREAST: Primary | ICD-10-CM

## 2025-06-22 ENCOUNTER — HOSPITAL ENCOUNTER (EMERGENCY)
Facility: HOSPITAL | Age: 50
Discharge: HOME | End: 2025-06-22
Payer: COMMERCIAL

## 2025-06-22 VITALS
RESPIRATION RATE: 16 BRPM | BODY MASS INDEX: 18.04 KG/M2 | DIASTOLIC BLOOD PRESSURE: 82 MMHG | SYSTOLIC BLOOD PRESSURE: 146 MMHG | TEMPERATURE: 97.5 F | HEART RATE: 92 BPM | WEIGHT: 119 LBS | HEIGHT: 68 IN | OXYGEN SATURATION: 97 %

## 2025-06-22 DIAGNOSIS — M54.6 ACUTE MIDLINE THORACIC BACK PAIN: ICD-10-CM

## 2025-06-22 DIAGNOSIS — G43.809 OTHER MIGRAINE WITHOUT STATUS MIGRAINOSUS, NOT INTRACTABLE: Primary | ICD-10-CM

## 2025-06-22 PROCEDURE — 96374 THER/PROPH/DIAG INJ IV PUSH: CPT

## 2025-06-22 PROCEDURE — 25010000002 HYDROMORPHONE PER 4 MG: Performed by: NURSE PRACTITIONER

## 2025-06-22 PROCEDURE — 25010000002 HEPARIN LOCK FLUSH PER 10 UNITS: Performed by: NURSE PRACTITIONER

## 2025-06-22 PROCEDURE — 25810000003 SODIUM CHLORIDE 0.9 % SOLUTION: Performed by: NURSE PRACTITIONER

## 2025-06-22 PROCEDURE — 25010000002 DIPHENHYDRAMINE PER 50 MG: Performed by: NURSE PRACTITIONER

## 2025-06-22 PROCEDURE — 96361 HYDRATE IV INFUSION ADD-ON: CPT

## 2025-06-22 PROCEDURE — 25010000002 DEXAMETHASONE PER 1 MG: Performed by: NURSE PRACTITIONER

## 2025-06-22 PROCEDURE — 99282 EMERGENCY DEPT VISIT SF MDM: CPT

## 2025-06-22 PROCEDURE — 96375 TX/PRO/DX INJ NEW DRUG ADDON: CPT

## 2025-06-22 PROCEDURE — 96376 TX/PRO/DX INJ SAME DRUG ADON: CPT

## 2025-06-22 RX ORDER — DEXAMETHASONE SODIUM PHOSPHATE 4 MG/ML
8 INJECTION, SOLUTION INTRA-ARTICULAR; INTRALESIONAL; INTRAMUSCULAR; INTRAVENOUS; SOFT TISSUE ONCE
Status: COMPLETED | OUTPATIENT
Start: 2025-06-22 | End: 2025-06-22

## 2025-06-22 RX ORDER — HEPARIN SODIUM (PORCINE) LOCK FLUSH IV SOLN 100 UNIT/ML 100 UNIT/ML
300 SOLUTION INTRAVENOUS ONCE
Status: COMPLETED | OUTPATIENT
Start: 2025-06-22 | End: 2025-06-22

## 2025-06-22 RX ORDER — SODIUM CHLORIDE 0.9 % (FLUSH) 0.9 %
10 SYRINGE (ML) INJECTION AS NEEDED
Status: DISCONTINUED | OUTPATIENT
Start: 2025-06-22 | End: 2025-06-22 | Stop reason: HOSPADM

## 2025-06-22 RX ORDER — DIPHENHYDRAMINE HYDROCHLORIDE 50 MG/ML
25 INJECTION, SOLUTION INTRAMUSCULAR; INTRAVENOUS ONCE
Status: COMPLETED | OUTPATIENT
Start: 2025-06-22 | End: 2025-06-22

## 2025-06-22 RX ORDER — HYDROMORPHONE HYDROCHLORIDE 1 MG/ML
0.5 INJECTION, SOLUTION INTRAMUSCULAR; INTRAVENOUS; SUBCUTANEOUS ONCE
Refills: 0 | Status: COMPLETED | OUTPATIENT
Start: 2025-06-22 | End: 2025-06-22

## 2025-06-22 RX ORDER — OXYCODONE AND ACETAMINOPHEN 5; 325 MG/1; MG/1
1 TABLET ORAL EVERY 6 HOURS PRN
Qty: 10 TABLET | Refills: 0 | Status: SHIPPED | OUTPATIENT
Start: 2025-06-22

## 2025-06-22 RX ADMIN — HYDROMORPHONE HYDROCHLORIDE 0.5 MG: 1 INJECTION, SOLUTION INTRAMUSCULAR; INTRAVENOUS; SUBCUTANEOUS at 11:51

## 2025-06-22 RX ADMIN — SODIUM CHLORIDE 1000 ML: 9 INJECTION, SOLUTION INTRAVENOUS at 11:51

## 2025-06-22 RX ADMIN — DEXAMETHASONE SODIUM PHOSPHATE 8 MG: 4 INJECTION, SOLUTION INTRA-ARTICULAR; INTRALESIONAL; INTRAMUSCULAR; INTRAVENOUS; SOFT TISSUE at 11:50

## 2025-06-22 RX ADMIN — DIPHENHYDRAMINE HYDROCHLORIDE 25 MG: 50 INJECTION, SOLUTION INTRAMUSCULAR; INTRAVENOUS at 11:50

## 2025-06-22 RX ADMIN — HEPARIN 300 UNITS: 100 SYRINGE at 13:14

## 2025-06-22 RX ADMIN — HYDROMORPHONE HYDROCHLORIDE 0.5 MG: 1 INJECTION, SOLUTION INTRAMUSCULAR; INTRAVENOUS; SUBCUTANEOUS at 12:29

## 2025-06-22 NOTE — DISCHARGE INSTRUCTIONS

## 2025-06-22 NOTE — ED PROVIDER NOTES
Subjective   History of Present Illness  Patient is a 50-year-old female presents today complaining of headache.  Patient does have a history of migraines.  Patient reports her pain is moderate to severe she been unable to control it with her home medications.    Headache      Review of Systems   Constitutional: Negative.    Eyes: Negative.    Respiratory: Negative.     Cardiovascular: Negative.    Gastrointestinal: Negative.    Endocrine: Negative.    Genitourinary: Negative.    Musculoskeletal: Negative.    Skin: Negative.    Allergic/Immunologic: Negative.    Neurological:  Positive for headaches.   Hematological: Negative.    Psychiatric/Behavioral: Negative.         Past Medical History:   Diagnosis Date    Hoyos's disease     Bipolar 1 disorder     Constipation     DDD (degenerative disc disease), cervical 05/29/2017    Fibromyalgia     IBS (irritable bowel syndrome)     Meningioma     Migraine     PONV (postoperative nausea and vomiting)     PTSD (post-traumatic stress disorder)     Rectal bleeding        Allergies   Allergen Reactions    Ativan [Lorazepam] Hallucinations     confusion    Sulfa Antibiotics Shortness Of Breath and Swelling    Sulfa Antibiotics Anaphylaxis    Reglan [Metoclopramide] Angioedema    Compazine [Prochlorperazine Edisylate] Hives    Demerol [Meperidine] Hives    Droperidol Itching    Metoclopramide Swelling    Toradol [Ketorolac Tromethamine] Hives and Itching    Toradol [Ketorolac Tromethamine] Hives    Zosyn [Piperacillin Sod-Tazobactam So] Hives       Past Surgical History:   Procedure Laterality Date    ANAL SCOPE N/A 7/28/2016    Procedure: ANAL SCOPE;  Surgeon: Kael Lopez MD;  Location: [facility] OR;  Service:     APPENDECTOMY      COLONOSCOPY N/A 6/30/2016    Procedure: COLONOSCOPY  CPTCODE:79066;  Surgeon: Jose Antonio Belle III, MD;  Location: [facility] OR;  Service:     COLONOSCOPY N/A 7/7/2016    Procedure: COLONOSCOPY (09495) CPT;  Surgeon: Jose Antonio Belle III, MD;   Location: [facility] OR;  Service:     CYSTOSCOPY RETROGRADE PYELOGRAM Bilateral 2017    Procedure: CYSTOSCOPY RETROGRADE PYELOGRAM;  Surgeon: Mu Blunt MD;  Location: [facility] OR;  Service:     ENDOSCOPY N/A 2016    Procedure: ESOPHAGOGASTRODUODENOSCOPY WITH BIOPSY  CPTCODE:96115;  Surgeon: Jose Antonio Belle III, MD;  Location: [facility] OR;  Service:     HEMORRHOIDECTOMY N/A 2016    Procedure: HEMORRHOID STAPLING;  Surgeon: Kael Lopez MD;  Location: [facility] OR;  Service:     HYSTERECTOMY      KNEE SURGERY      LAPAROSCOPIC SALPINGOOPHERECTOMY      PORTACATH PLACEMENT N/A 2017    Procedure: INSERTION OF PORTACATH;  Surgeon: Celso Arredondo MD;  Location: [facility];  Service:     SHOULDER SURGERY      3 times       Family History   Problem Relation Age of Onset    Crohn's disease Other     Hypertension Other     Diabetes Other     Irritable bowel syndrome Other     No Known Problems Father     No Known Problems Mother        Social History     Socioeconomic History    Marital status:    Tobacco Use    Smoking status: Former     Current packs/day: 0.00     Average packs/day: 1 pack/day for 20.0 years (20.0 ttl pk-yrs)     Types: Cigarettes     Start date: 1995     Quit date: 2015     Years since quittin.6     Passive exposure: Past    Smokeless tobacco: Never   Vaping Use    Vaping status: Never Used   Substance and Sexual Activity    Alcohol use: No    Drug use: Yes     Types: Marijuana     Comment: for pain    Sexual activity: Defer     Birth control/protection: Surgical           Objective   Physical Exam  Vitals and nursing note reviewed.   Constitutional:       Appearance: She is well-developed.   HENT:      Head: Normocephalic.      Right Ear: External ear normal.      Left Ear: External ear normal.   Eyes:      Conjunctiva/sclera: Conjunctivae normal.      Pupils: Pupils are equal, round, and reactive to light.   Cardiovascular:      Rate and Rhythm:  Normal rate and regular rhythm.      Heart sounds: Normal heart sounds.   Pulmonary:      Effort: Pulmonary effort is normal.      Breath sounds: Normal breath sounds.   Abdominal:      General: Bowel sounds are normal.      Palpations: Abdomen is soft.   Musculoskeletal:         General: Normal range of motion.      Cervical back: Normal range of motion and neck supple.   Skin:     General: Skin is warm and dry.      Capillary Refill: Capillary refill takes less than 2 seconds.   Neurological:      Mental Status: She is alert and oriented to person, place, and time.   Psychiatric:         Behavior: Behavior normal.         Thought Content: Thought content normal.         Procedures           ED Course                                                       Medical Decision Making  MDM:    Escalation of care including admission/observation considered    - Discussions of management with other providers:  None    - Discussed/reviewed with Radiology regarding test interpretation    - Independent interpretation: None    - Additional patient history obtained from: None    - Review of external non-ED record (if available):  Prior Inpt record, Office record, Outpt record, Prior Outpt labs, PCP record, Outside ED record, Other    - Chronic conditions affecting care: See HPI and medical Hx.    - Social Determinants of health significantly affecting care:  None        Medical Decision Making Discussion:    Patient is a 50-year-old female presents today complaining of headache.  Patient does have a history of migraines.  Patient reports her pain is moderate to severe she been unable to control it with her home medications.      The patient has been given very strict return precautions to return to the emergency department should there be any acute change or worsening of their condition.  I have explained my findings and the patient has expressed understanding to me.  I explained that the work-up performed in the ED has been  based on the specific complaint and concern, as the nature of emergency medicine is complaint driven and they understand that new symptoms may arise.  I have told them that, should there be any new symptoms, worsening or changing symptoms, a new work-up may be indicated that they are encouraged to return to the emergency department or promptly contact their primary care physician. We have employed a shared decision-making process as the discussion of their disposition.  The patient has been educated as to the nature of the visit, the tests and work-up performed and the findings from today's visit. At this time, there does not appear to be any acute emergent process that necessitates admission to the hospital, however, the patient understands that this can change unexpectedly. At this time, the patient is stable for discharge home and agrees to follow-up with her primary care physician in the next 24 to 48 hours or earlier should they be able to obtain an appointment.    The patient was counseled regarding diagnostic results and treatment plan and patient has indicated understanding of these instructions.      Problems Addressed:  Acute midline thoracic back pain: complicated acute illness or injury  Other migraine without status migrainosus, not intractable: complicated acute illness or injury    Risk  Prescription drug management.        Final diagnoses:   Other migraine without status migrainosus, not intractable   Acute midline thoracic back pain       ED Disposition  ED Disposition       ED Disposition   Discharge    Condition   Stable    Comment   --               Mabel Patterson, APRN  [address]  [address] [address]  [phone]    Schedule an appointment as soon as possible for a visit   For further evaluation         Medication List        Changed      * oxyCODONE-acetaminophen  MG per tablet  Commonly known as: Percocet  Take 1 tablet by mouth Every 6 (Six) Hours As Needed for  Moderate Pain.  What changed: Another medication with the same name was added. Make sure you understand how and when to take each.     * oxyCODONE-acetaminophen 5-325 MG per tablet  Commonly known as: PERCOCET  Take 1 tablet by mouth Every 6 (Six) Hours As Needed for Severe Pain.  What changed: Another medication with the same name was added. Make sure you understand how and when to take each.     * oxyCODONE-acetaminophen 5-325 MG per tablet  Commonly known as: PERCOCET  Take 1 tablet by mouth Every 6 (Six) Hours As Needed for Moderate Pain.  What changed: You were already taking a medication with the same name, and this prescription was added. Make sure you understand how and when to take each.           * This list has 3 medication(s) that are the same as other medications prescribed for you. Read the directions carefully, and ask your doctor or other care provider to review them with you.                   Where to Get Your Medications        These medications were sent to [facility] [address] [place] [address] [phone] [facility] [phone] [address] [address]      Phone: [phone]   oxyCODONE-acetaminophen 5-325 MG per tablet            Ja Suarez P, APRN  06/22/25 1362

## 2025-06-27 NOTE — PLAN OF CARE
Called for pt again. Spoke with her today. R/S ENT appt to 6/30/25 at 1 pm. Thanks, Nirali   Problem: Patient Care Overview  Goal: Plan of Care Review  Outcome: Ongoing (interventions implemented as appropriate)   03/21/18 0328   Plan of Care Review   Progress improving   OTHER   Outcome Summary VSS, medicated for pain and nausea. Pt verbalizes that she feels she feels well enough to be discharged. Pt slept well throughout the night.    Coping/Psychosocial   Plan of Care Reviewed With patient     Goal: Individualization and Mutuality  Outcome: Ongoing (interventions implemented as appropriate)    Goal: Discharge Needs Assessment  Outcome: Ongoing (interventions implemented as appropriate)    Goal: Interprofessional Rounds/Family Conf  Outcome: Ongoing (interventions implemented as appropriate)      Problem: Pain, Acute (Adult)  Goal: Identify Related Risk Factors and Signs and Symptoms  Outcome: Ongoing (interventions implemented as appropriate)    Goal: Acceptable Pain Control/Comfort Level  Outcome: Ongoing (interventions implemented as appropriate)      Problem: Nausea/Vomiting (Adult)  Goal: Identify Related Risk Factors and Signs and Symptoms  Outcome: Ongoing (interventions implemented as appropriate)    Goal: Symptom Relief  Outcome: Ongoing (interventions implemented as appropriate)    Goal: Adequate Hydration  Outcome: Ongoing (interventions implemented as appropriate)

## 2025-07-06 ENCOUNTER — HOSPITAL ENCOUNTER (EMERGENCY)
Facility: HOSPITAL | Age: 50
Discharge: LEFT BEFORE DISCHARGE | End: 2025-07-06
Payer: COMMERCIAL

## 2025-07-06 PROCEDURE — 99211 OFF/OP EST MAY X REQ PHY/QHP: CPT

## 2025-07-06 NOTE — ED NOTES
Pt was called into triage and stated that the wait was too long. She said she had an appointment Tuesday and would try to wait until then. If the pain gets worse she will come back.    Procedural Text: The filler was administered to the treatment areas noted above.

## 2025-07-08 ENCOUNTER — OFFICE VISIT (OUTPATIENT)
Dept: UROLOGY | Facility: CLINIC | Age: 50
End: 2025-07-08
Payer: COMMERCIAL

## 2025-07-08 VITALS
HEIGHT: 68 IN | BODY MASS INDEX: 16.67 KG/M2 | WEIGHT: 110 LBS | DIASTOLIC BLOOD PRESSURE: 79 MMHG | SYSTOLIC BLOOD PRESSURE: 125 MMHG | HEART RATE: 134 BPM

## 2025-07-08 DIAGNOSIS — N35.021 URETHRAL STRICTURE DUE TO CHILDBIRTH: Primary | ICD-10-CM

## 2025-07-08 RX ORDER — GENTAMICIN 40 MG/ML
80 INJECTION, SOLUTION INTRAMUSCULAR; INTRAVENOUS ONCE
Status: COMPLETED | OUTPATIENT
Start: 2025-07-08 | End: 2025-07-08

## 2025-07-08 RX ADMIN — GENTAMICIN 80 MG: 40 INJECTION, SOLUTION INTRAMUSCULAR; INTRAVENOUS at 11:16

## 2025-07-08 NOTE — PROGRESS NOTES
"Chief Complaint:    Chief Complaint   Patient presents with    Urethral stricture due to childbirth       Vital Signs:   /79 (BP Location: Right arm, Patient Position: Sitting, Cuff Size: Adult)   Pulse (!) 134   Ht 172.7 cm (67.99\")   Wt 49.9 kg (110 lb)   BMI 16.73 kg/m²   Body mass index is 16.73 kg/m².      HPI:  Susanna Camilo is a 50 y.o. female who presents today for follow up    History of Present Illness  Ms. Camilo returns to the clinic today for urethral stricture.  She follows along with Dr. Blunt and undergoes intermittent urethral dilations.  She was last seen 1 month ago.  She did wish to undergo repeat dilation and she was dilated to a 20 French.  She tolerated this well with no acute complications.      Past Medical History:  Past Medical History:   Diagnosis Date    Hoyos's disease     Bipolar 1 disorder     Constipation     DDD (degenerative disc disease), cervical 05/29/2017    Fibromyalgia     IBS (irritable bowel syndrome)     Meningioma     Migraine     PONV (postoperative nausea and vomiting)     PTSD (post-traumatic stress disorder)     Rectal bleeding        Current Meds:  Current Outpatient Medications   Medication Sig Dispense Refill    albuterol (PROVENTIL HFA;VENTOLIN HFA) 108 (90 Base) MCG/ACT inhaler Inhale 2 puffs 2 (Two) Times a Day.      Azelastine HCl 137 MCG/SPRAY solution Administer 1 spray into the nostril(s) as directed by provider As Needed (Allergies).      bacitracin 500 UNIT/GM ointment Apply 1 Application topically to the appropriate area as directed 2 (Two) Times a Day. 14 g 0    busPIRone (BUSPAR) 15 MG tablet Take 1 tablet by mouth 3 (Three) Times a Day.      diclofenac (VOLTAREN) 1 % gel gel       divalproex (DEPAKOTE) 500 MG DR tablet Take 1 tablet by mouth 3 (Three) Times a Day. 90 tablet 2    docusate sodium (COLACE) 100 MG capsule Take 1 capsule by mouth 2 (Two) Times a Day. 20 capsule 0    fluticasone (VERAMYST) 27.5 MCG/SPRAY nasal spray Administer 2 " sprays into the nostril(s) as directed by provider Daily.      HYDROcodone-acetaminophen (NORCO)  MG per tablet Take 1 tablet by mouth Every 8 (Eight) Hours As Needed for Moderate Pain or Severe Pain for up to 12 doses. 12 tablet 0    megestrol (MEGACE) 20 MG tablet Take 1 tablet by mouth Daily. 30 tablet 3    methylPREDNISolone (MEDROL) 4 MG dose pack Take as directed on package instructions. 21 tablet 0    metroNIDAZOLE (FLAGYL) 500 MG tablet Take 1 tablet by mouth 3 (Three) Times a Day. 30 tablet 0    montelukast (SINGULAIR) 10 MG tablet Take 1 tablet by mouth Every Night.      mupirocin (BACTROBAN) 2 % ointment Apply 1 Application topically to the appropriate area as directed 3 (Three) Times a Day. 22 g 0    naloxone (NARCAN) 4 MG/0.1ML nasal spray Call 911. Don't prime. [place] [facility] in 1 nostril for overdose. Repeat in 2-3 minutes in other nostril if no or minimal breathing/responsiveness. 2 each 0    nitrofurantoin, macrocrystal-monohydrate, (MACROBID) 100 MG capsule Take 1 capsule by mouth 2 (Two) Times a Day. 13 capsule 0    ondansetron ODT (ZOFRAN-ODT) 4 MG disintegrating tablet Place 2 tablets on the tongue Every 8 (Eight) Hours As Needed for Nausea or Vomiting. 30 tablet 0    oxyCODONE-acetaminophen (Percocet)  MG per tablet Take 1 tablet by mouth Every 6 (Six) Hours As Needed for Moderate Pain. 20 tablet 0    oxyCODONE-acetaminophen (PERCOCET) 5-325 MG per tablet Take 1 tablet by mouth Every 6 (Six) Hours As Needed for Severe Pain. 12 tablet 0    oxyCODONE-acetaminophen (PERCOCET) 5-325 MG per tablet Take 1 tablet by mouth Every 6 (Six) Hours As Needed for Moderate Pain. 10 tablet 0    pantoprazole (PROTONIX) 40 MG EC tablet Take 1 tablet by mouth 2 (Two) Times a Day As Needed.      phenazopyridine (PYRIDIUM) 100 MG tablet Take 1 tablet by mouth 3 (Three) Times a Day As Needed for Bladder Spasms. 30 tablet 1    phenazopyridine (Pyridium) 200 MG tablet Take 1 tablet by mouth 3 (Three) Times a Day As  Needed for Bladder Spasms. 20 tablet 0    polyethylene glycol (MIRALAX) 17 GM/SCOOP powder Take 17 g by mouth Daily. 225 g 0    potassium chloride (KLOR-CON M20) 20 MEQ CR tablet Take 1 tablet by mouth Daily. 10 tablet 0    promethazine (PHENERGAN) 25 MG tablet Take 1 tablet by mouth Every 6 (Six) Hours As Needed for Nausea or Vomiting. 28 tablet 4    promethazine (PHENERGAN) 25 MG tablet Take 1 tablet by mouth Every 6 (Six) Hours As Needed for Nausea or Vomiting. 21 tablet 5    sertraline (ZOLOFT) 100 MG tablet Take 1 tablet by mouth 2 (Two) Times a Day.      SUMAtriptan (IMITREX) 6 MG/0.5ML injection Inject prescribed dose at onset of headache. May repeat dose one time in 1 hour(s) if headache not relieved.      traMADol (ULTRAM) 50 MG tablet Take 1 tablet by mouth Every 8 (Eight) Hours As Needed for Moderate Pain. 12 tablet 0    terazosin (HYTRIN) 2 MG capsule Take 1 capsule by mouth Every Night for 14 days. 14 capsule 0     No current facility-administered medications for this visit.        Allergies:   Allergies   Allergen Reactions    Ativan [Lorazepam] Hallucinations     confusion    Sulfa Antibiotics Shortness Of Breath and Swelling    Sulfa Antibiotics Anaphylaxis    Reglan [Metoclopramide] Angioedema    Compazine [Prochlorperazine Edisylate] Hives    Demerol [Meperidine] Hives    Droperidol Itching    Metoclopramide Swelling    Toradol [Ketorolac Tromethamine] Hives and Itching    Toradol [Ketorolac Tromethamine] Hives    Zosyn [Piperacillin Sod-Tazobactam So] Hives        Past Surgical History:  Past Surgical History:   Procedure Laterality Date    ANAL SCOPE N/A 7/28/2016    Procedure: ANAL SCOPE;  Surgeon: Kael Lopez MD;  Location:  [facility] OR;  Service:     APPENDECTOMY      COLONOSCOPY N/A 6/30/2016    Procedure: COLONOSCOPY  CPTCODE:26418;  Surgeon: Jose Antonio Belle III, MD;  Location:  [facility] OR;  Service:     COLONOSCOPY N/A 7/7/2016    Procedure: COLONOSCOPY (79014) CPT;  Surgeon: Jose Antonio  Reynold Belle III, MD;  Location: [facility];  Service:     CYSTOSCOPY RETROGRADE PYELOGRAM Bilateral 2017    Procedure: CYSTOSCOPY RETROGRADE PYELOGRAM;  Surgeon: Mu Blunt MD;  Location: [facility] OR;  Service:     ENDOSCOPY N/A 2016    Procedure: ESOPHAGOGASTRODUODENOSCOPY WITH BIOPSY  CPTCODE:00356;  Surgeon: Jose Antonio Belle III, MD;  Location: [facility] OR;  Service:     HEMORRHOIDECTOMY N/A 2016    Procedure: HEMORRHOID STAPLING;  Surgeon: Kael Lopez MD;  Location: [facility] OR;  Service:     HYSTERECTOMY      KNEE SURGERY      LAPAROSCOPIC SALPINGOOPHERECTOMY      PORTACATH PLACEMENT N/A 2017    Procedure: INSERTION OF PORTACATH;  Surgeon: Celso Arredondo MD;  Location: [facility];  Service:     SHOULDER SURGERY      3 times       Social History:  Social History     Socioeconomic History    Marital status:    Tobacco Use    Smoking status: Former     Current packs/day: 0.00     Average packs/day: 1 pack/day for 20.0 years (20.0 ttl pk-yrs)     Types: Cigarettes     Start date: 1995     Quit date: 2015     Years since quittin.6     Passive exposure: Past    Smokeless tobacco: Never   Vaping Use    Vaping status: Never Used   Substance and Sexual Activity    Alcohol use: No    Drug use: Yes     Types: Marijuana     Comment: for pain    Sexual activity: Defer     Birth control/protection: Surgical       Family History:  Family History   Problem Relation Age of Onset    Crohn's disease Other     Hypertension Other     Diabetes Other     Irritable bowel syndrome Other     No Known Problems Father     No Known Problems Mother        Review of Systems:  Review of Systems   Constitutional:  Negative for fatigue, fever and unexpected weight change.   Respiratory:  Negative for chest tightness and shortness of breath.    Cardiovascular:  Negative for chest pain.   Gastrointestinal:  Negative for abdominal pain, constipation, diarrhea, nausea and vomiting.    Genitourinary:  Positive for difficulty urinating and frequency. Negative for dysuria and urgency.   Skin:  Negative for rash.   Psychiatric/Behavioral:  Negative for confusion and suicidal ideas.        Physical Exam:  Physical Exam  Constitutional:       General: She is not in acute distress.     Appearance: Normal appearance.   HENT:      Head: Normocephalic and atraumatic.      Nose: Nose normal.      Mouth/Throat:      Mouth: Mucous membranes are moist.   Eyes:      Conjunctiva/sclera: Conjunctivae normal.   Cardiovascular:      Rate and Rhythm: Normal rate.      Pulses: Normal pulses.   Pulmonary:      Effort: Pulmonary effort is normal.   Abdominal:      Palpations: Abdomen is soft.   Genitourinary:     Comments: Mild urethral stenosis.  Musculoskeletal:         General: Normal range of motion.      Cervical back: Normal range of motion.   Skin:     General: Skin is warm.   Neurological:      General: No focal deficit present.      Mental Status: She is alert and oriented to person, place, and time.   Psychiatric:         Mood and Affect: Mood normal.         Behavior: Behavior normal.         Thought Content: Thought content normal.         Judgment: Judgment normal.           Recent Image (CT and/or KUB):   CT Abdomen and Pelvis: No results found for this or any previous visit.     CT Stone Protocol: Results for orders placed during the hospital encounter of 05/31/25    CT Abdomen Pelvis Stone Protocol    Narrative  Examination: CT abdomen pelvis without    Procedure: Contiguous axial images were obtained through the abdomen and  pelvis without the use of intravenous contrast.  Sagittal and coronal  reformations are supplied.    Comparison: 5/28/2025    Findings: Focal bronchial dilatation noted in the right lower lobe with  soft tissue or fluid in the lumen     .    Evaluation of solid organs is limited without the use of intravenous  contrast.  Allowing for this, hepatomegaly is present measuring 16  cm.  The gallbladder, pancreas, spleen, under distended stomach, adrenals,  aorta and IVC are morphologically unremarkable.    Moderate atherosclerotic disease of the distal abdominal aorta.    The kidneys are bilaterally symmetric.  No obstructing renal calculus or  hydronephrosis.  Probable right lower pole cyst measuring 14 mm present.  No perinephric stranding.    An extremely large amount of formed stool and gas is present throughout  the colon.  Dilated loops of bowel, extraluminal gas or adenopathy.    No inflammatory change in the right lower quadrant.    No free fluid in the pelvis.  Urinary bladder distends normally.  Uterus  is not identified.  Suspected postsurgical change of the anorectal  junction no surrounding inflammatory change.        In bone windows, moderate osseous demineralization.  No acute osseous  abnormality.  Mild compression deformity of the superior endplate of L2,  unchanged.    Impression  1.  No CT evidence of an acute abdominal or pelvic abnormality.    2.  Large amount of formed stool and gas present throughout the colon,  unchanged.    3.  Focal bronchiectasis in the lung, right lower lobe at the edge of  the field-of-view containing a small amount of fluid or soft tissue  attenuation.  Clinically appropriate, dedicated chest CT could be  considered.    This report was finalized on 5/31/2025 12:28 PM by Mary Douglas MD.     KUB: Results for orders placed during the hospital encounter of 01/10/23    XR Abdomen KUB    Narrative  EXAM:  XR Abdomen, 1 View    EXAM DATE:  1/10/2023 12:05 PM    CLINICAL HISTORY:  flank pain    TECHNIQUE:  Frontal supine view of the abdomen/pelvis.    COMPARISON:  10/01/2022    FINDINGS:  Gastrointestinal tract:  Unremarkable.  No dilation.  Organs:  No stones identified along the expected course of ureters.  Bones/joints:  Unremarkable.  Soft tissues:  Surgical staples in the lower pelvis.  Vasculature:  Stable phleboliths lower  pelvis.    Impression  1.  No acute findings radiographically evident.  2.  No radiographic evidence of renal or ureteral stones.    This report was finalized on 1/10/2023 12:47 PM by Dr. Ankit aNik MD.       Labs:  Brief Urine Lab Results  (Last result in the past 365 days)        Color   Clarity   Blood   Leuk Est   Nitrite   Protein   CREAT   Urine HCG        05/31/25 1147 Red   Cloudy   Large (3+)   Trace   Negative   Trace                 Admission on 05/31/2025, Discharged on 05/31/2025   Component Date Value Ref Range Status    Glucose 05/31/2025 99  65 - 99 mg/dL Final    BUN 05/31/2025 6.8  6.0 - 20.0 mg/dL Final    Creatinine 05/31/2025 0.53 (L)  0.57 - 1.00 mg/dL Final    Sodium 05/31/2025 140  136 - 145 mmol/L Final    Potassium 05/31/2025 4.0  3.5 - 5.2 mmol/L Final    Chloride 05/31/2025 104  98 - 107 mmol/L Final    CO2 05/31/2025 26.0  22.0 - 29.0 mmol/L Final    Calcium 05/31/2025 9.1  8.6 - 10.5 mg/dL Final    Total Protein 05/31/2025 7.0  6.0 - 8.5 g/dL Final    Albumin 05/31/2025 4.4  3.5 - 5.2 g/dL Final    ALT (SGPT) 05/31/2025 28  1 - 33 U/L Final    AST (SGOT) 05/31/2025 28  1 - 32 U/L Final    Alkaline Phosphatase 05/31/2025 92  39 - 117 U/L Final    Total Bilirubin 05/31/2025 0.5  0.0 - 1.2 mg/dL Final    Globulin 05/31/2025 2.6  gm/dL Final    A/G Ratio 05/31/2025 1.7  g/dL Final    BUN/Creatinine Ratio 05/31/2025 12.8  7.0 - 25.0 Final    Anion Gap 05/31/2025 10.0  5.0 - 15.0 mmol/L Final    eGFR 05/31/2025 112.8  >60.0 mL/min/1.73 Final    Color, UA 05/31/2025 Red (A)  Yellow, Straw Final    Appearance, UA 05/31/2025 Cloudy (A)  Clear Final    pH, UA 05/31/2025 >=9.0 (H)  5.0 - 8.0 Final    Specific Gravity, UA 05/31/2025 1.012  1.005 - 1.030 Final    Glucose, UA 05/31/2025 Negative  Negative Final    Ketones, UA 05/31/2025 Negative  Negative Final    Bilirubin, UA 05/31/2025 Negative  Negative Final    Blood, UA 05/31/2025 Large (3+) (A)  Negative Final    Protein, UA 05/31/2025  Trace (A)  Negative Final    Leuk Esterase, UA 05/31/2025 Trace (A)  Negative Final    Nitrite, UA 05/31/2025 Negative  Negative Final    Urobilinogen, UA 05/31/2025 1.0 E.U./dL  0.2 - 1.0 E.U./dL Final    QT Interval 05/31/2025 370  ms Final    QTC Interval 05/31/2025 455  ms Final    HS Troponin T 05/31/2025 <6  <14 ng/L Final    WBC 05/31/2025 6.35  3.40 - 10.80 10*3/mm3 Final    RBC 05/31/2025 5.02  3.77 - 5.28 10*6/mm3 Final    Hemoglobin 05/31/2025 15.1  12.0 - 15.9 g/dL Final    Hematocrit 05/31/2025 46.3  34.0 - 46.6 % Final    MCV 05/31/2025 92.2  79.0 - 97.0 fL Final    MCH 05/31/2025 30.1  26.6 - 33.0 pg Final    MCHC 05/31/2025 32.6  31.5 - 35.7 g/dL Final    RDW 05/31/2025 13.0  12.3 - 15.4 % Final    RDW-SD 05/31/2025 43.8  37.0 - 54.0 fl Final    MPV 05/31/2025 10.0  6.0 - 12.0 fL Final    Platelets 05/31/2025 191  140 - 450 10*3/mm3 Final    Neutrophil % 05/31/2025 65.4  42.7 - 76.0 % Final    Lymphocyte % 05/31/2025 28.7  19.6 - 45.3 % Final    Monocyte % 05/31/2025 4.9 (L)  5.0 - 12.0 % Final    Eosinophil % 05/31/2025 0.5  0.3 - 6.2 % Final    Basophil % 05/31/2025 0.3  0.0 - 1.5 % Final    Immature Grans % 05/31/2025 0.2  0.0 - 0.5 % Final    Neutrophils, Absolute 05/31/2025 4.16  1.70 - 7.00 10*3/mm3 Final    Lymphocytes, Absolute 05/31/2025 1.82  0.70 - 3.10 10*3/mm3 Final    Monocytes, Absolute 05/31/2025 0.31  0.10 - 0.90 10*3/mm3 Final    Eosinophils, Absolute 05/31/2025 0.03  0.00 - 0.40 10*3/mm3 Final    Basophils, Absolute 05/31/2025 0.02  0.00 - 0.20 10*3/mm3 Final    Immature Grans, Absolute 05/31/2025 0.01  0.00 - 0.05 10*3/mm3 Final    nRBC 05/31/2025 0.0  0.0 - 0.2 /100 WBC Final    RBC, UA 05/31/2025 Too Numerous to Count (A)  None Seen, 0-2 /HPF Final    WBC, UA 05/31/2025 0-2  None Seen, 0-2 /HPF Final    Urine culture not indicated.    Bacteria, UA 05/31/2025 None Seen  None Seen /HPF Final    Squamous Epithelial Cells, UA 05/31/2025 3-6 (A)  None Seen, 0-2 /HPF Final    Hyaline  Casts, UA 05/31/2025 None Seen  None Seen /LPF Final    Methodology 05/31/2025 Automated Microscopy   Final   Admission on 05/28/2025, Discharged on 05/28/2025   Component Date Value Ref Range Status    Glucose 05/28/2025 116 (H)  65 - 99 mg/dL Final    BUN 05/28/2025 10.6  6.0 - 20.0 mg/dL Final    Creatinine 05/28/2025 0.71  0.57 - 1.00 mg/dL Final    Sodium 05/28/2025 140  136 - 145 mmol/L Final    Potassium 05/28/2025 4.0  3.5 - 5.2 mmol/L Final    Chloride 05/28/2025 101  98 - 107 mmol/L Final    CO2 05/28/2025 28.4  22.0 - 29.0 mmol/L Final    Calcium 05/28/2025 9.4  8.6 - 10.5 mg/dL Final    Total Protein 05/28/2025 7.7  6.0 - 8.5 g/dL Final    Albumin 05/28/2025 4.7  3.5 - 5.2 g/dL Final    ALT (SGPT) 05/28/2025 33  1 - 33 U/L Final    AST (SGOT) 05/28/2025 32  1 - 32 U/L Final    Alkaline Phosphatase 05/28/2025 96  39 - 117 U/L Final    Total Bilirubin 05/28/2025 0.6  0.0 - 1.2 mg/dL Final    Globulin 05/28/2025 3.0  gm/dL Final    A/G Ratio 05/28/2025 1.6  g/dL Final    BUN/Creatinine Ratio 05/28/2025 14.9  7.0 - 25.0 Final    Anion Gap 05/28/2025 10.6  5.0 - 15.0 mmol/L Final    eGFR 05/28/2025 103.7  >60.0 mL/min/1.73 Final    Lipase 05/28/2025 31  13 - 60 U/L Final    Color, UA 05/28/2025 Fe (A)  Yellow, Straw Final    Appearance, UA 05/28/2025 Cloudy (A)  Clear Final    pH, UA 05/28/2025 6.0  5.0 - 8.0 Final    Specific Gravity, UA 05/28/2025 1.009  1.005 - 1.030 Final    Glucose, UA 05/28/2025 Negative  Negative Final    Ketones, UA 05/28/2025 Negative  Negative Final    Bilirubin, UA 05/28/2025 Negative  Negative Final    Blood, UA 05/28/2025 Large (3+) (A)  Negative Final    Protein, UA 05/28/2025 30 mg/dL (1+) (A)  Negative Final    Leuk Esterase, UA 05/28/2025 Trace (A)  Negative Final    Nitrite, UA 05/28/2025 Negative  Negative Final    Urobilinogen, UA 05/28/2025 1.0 E.U./dL  0.2 - 1.0 E.U./dL Final    Lactate 05/28/2025 1.2  0.5 - 2.0 mmol/L Final    Extra Tube 05/28/2025 Hold for add-ons.    Final    Auto resulted.    Extra Tube 05/28/2025 hold for add-on   Final    Auto resulted    Extra Tube 05/28/2025 Hold for add-ons.   Final    Auto resulted    WBC 05/28/2025 5.70  3.40 - 10.80 10*3/mm3 Final    RBC 05/28/2025 5.63 (H)  3.77 - 5.28 10*6/mm3 Final    Hemoglobin 05/28/2025 17.1 (H)  12.0 - 15.9 g/dL Final    Hematocrit 05/28/2025 53.7 (H)  34.0 - 46.6 % Final    MCV 05/28/2025 95.4  79.0 - 97.0 fL Final    MCH 05/28/2025 30.4  26.6 - 33.0 pg Final    MCHC 05/28/2025 31.8  31.5 - 35.7 g/dL Final    RDW 05/28/2025 13.2  12.3 - 15.4 % Final    RDW-SD 05/28/2025 46.5  37.0 - 54.0 fl Final    MPV 05/28/2025 10.0  6.0 - 12.0 fL Final    Platelets 05/28/2025 186  140 - 450 10*3/mm3 Final    Neutrophil % 05/28/2025 61.3  42.7 - 76.0 % Final    Lymphocyte % 05/28/2025 30.5  19.6 - 45.3 % Final    Monocyte % 05/28/2025 6.3  5.0 - 12.0 % Final    Eosinophil % 05/28/2025 1.1  0.3 - 6.2 % Final    Basophil % 05/28/2025 0.4  0.0 - 1.5 % Final    Immature Grans % 05/28/2025 0.4  0.0 - 0.5 % Final    Neutrophils, Absolute 05/28/2025 3.50  1.70 - 7.00 10*3/mm3 Final    Lymphocytes, Absolute 05/28/2025 1.74  0.70 - 3.10 10*3/mm3 Final    Monocytes, Absolute 05/28/2025 0.36  0.10 - 0.90 10*3/mm3 Final    Eosinophils, Absolute 05/28/2025 0.06  0.00 - 0.40 10*3/mm3 Final    Basophils, Absolute 05/28/2025 0.02  0.00 - 0.20 10*3/mm3 Final    Immature Grans, Absolute 05/28/2025 0.02  0.00 - 0.05 10*3/mm3 Final    nRBC 05/28/2025 0.0  0.0 - 0.2 /100 WBC Final    RBC, UA 05/28/2025 Too Numerous to Count (A)  None Seen, 0-2 /HPF Final    WBC, UA 05/28/2025 3-5 (A)  None Seen, 0-2 /HPF Final    Bacteria, UA 05/28/2025 None Seen  None Seen /HPF Final    Squamous Epithelial Cells, UA 05/28/2025 0-2  None Seen, 0-2 /HPF Final    Hyaline Casts, UA 05/28/2025 None Seen  None Seen /LPF Final    Methodology 05/28/2025 Automated Microscopy   Final    HCG, Urine QL 05/28/2025 Negative  Negative Final   Admission on 05/15/2025,  Discharged on 05/15/2025   Component Date Value Ref Range Status    QT Interval 05/15/2025 402  ms Corrected    QTC Interval 05/15/2025 452  ms Corrected    Glucose 05/15/2025 131 (H)  65 - 99 mg/dL Final    BUN 05/15/2025 6  6 - 20 mg/dL Final    Creatinine 05/15/2025 0.55 (L)  0.57 - 1.00 mg/dL Final    Sodium 05/15/2025 140  136 - 145 mmol/L Final    Potassium 05/15/2025 3.9  3.5 - 5.2 mmol/L Final    Chloride 05/15/2025 104  98 - 107 mmol/L Final    CO2 05/15/2025 27.7  22.0 - 29.0 mmol/L Final    Calcium 05/15/2025 8.7  8.6 - 10.5 mg/dL Final    Total Protein 05/15/2025 6.6  6.0 - 8.5 g/dL Final    Albumin 05/15/2025 3.9  3.5 - 5.2 g/dL Final    ALT (SGPT) 05/15/2025 25  1 - 33 U/L Final    AST (SGOT) 05/15/2025 27  1 - 32 U/L Final    Alkaline Phosphatase 05/15/2025 85  39 - 117 U/L Final    Total Bilirubin 05/15/2025 0.5  0.0 - 1.2 mg/dL Final    Globulin 05/15/2025 2.7  gm/dL Final    A/G Ratio 05/15/2025 1.4  g/dL Final    BUN/Creatinine Ratio 05/15/2025 10.9  7.0 - 25.0 Final    Anion Gap 05/15/2025 8.3  5.0 - 15.0 mmol/L Final    eGFR 05/15/2025 111.8  >60.0 mL/min/1.73 Final    HS Troponin T 05/15/2025 7  <14 ng/L Final    Extra Tube 05/15/2025 Hold for add-ons.   Final    Auto resulted.    Extra Tube 05/15/2025 hold for add-on   Final    Auto resulted    Extra Tube 05/15/2025 Hold for add-ons.   Final    Auto resulted.    Extra Tube 05/15/2025 Hold for add-ons.   Final    Auto resulted    WBC 05/15/2025 6.53  3.40 - 10.80 10*3/mm3 Final    RBC 05/15/2025 4.61  3.77 - 5.28 10*6/mm3 Final    Hemoglobin 05/15/2025 14.2  12.0 - 15.9 g/dL Final    Hematocrit 05/15/2025 42.6  34.0 - 46.6 % Final    MCV 05/15/2025 92.4  79.0 - 97.0 fL Final    MCH 05/15/2025 30.8  26.6 - 33.0 pg Final    MCHC 05/15/2025 33.3  31.5 - 35.7 g/dL Final    RDW 05/15/2025 13.2  12.3 - 15.4 % Final    RDW-SD 05/15/2025 44.4  37.0 - 54.0 fl Final    MPV 05/15/2025 10.7  6.0 - 12.0 fL Final    Platelets 05/15/2025 148  140 - 450  10*3/mm3 Final    Neutrophil % 05/15/2025 71.6  42.7 - 76.0 % Final    Lymphocyte % 05/15/2025 20.7  19.6 - 45.3 % Final    Monocyte % 05/15/2025 5.4  5.0 - 12.0 % Final    Eosinophil % 05/15/2025 1.2  0.3 - 6.2 % Final    Basophil % 05/15/2025 0.3  0.0 - 1.5 % Final    Immature Grans % 05/15/2025 0.8 (H)  0.0 - 0.5 % Final    Neutrophils, Absolute 05/15/2025 4.68  1.70 - 7.00 10*3/mm3 Final    Lymphocytes, Absolute 05/15/2025 1.35  0.70 - 3.10 10*3/mm3 Final    Monocytes, Absolute 05/15/2025 0.35  0.10 - 0.90 10*3/mm3 Final    Eosinophils, Absolute 05/15/2025 0.08  0.00 - 0.40 10*3/mm3 Final    Basophils, Absolute 05/15/2025 0.02  0.00 - 0.20 10*3/mm3 Final    Immature Grans, Absolute 05/15/2025 0.05  0.00 - 0.05 10*3/mm3 Final    nRBC 05/15/2025 0.0  0.0 - 0.2 /100 WBC Final    Protime 05/15/2025 13.6  11.6 - 15.1 Seconds Final    INR 05/15/2025 1.03  0.90 - 1.10 Final    PTT 05/15/2025 83.2 (H)  24.5 - 35.9 seconds Final    Creatine Kinase 05/15/2025 34  20 - 180 U/L Final    Magnesium 05/15/2025 1.8  1.6 - 2.6 mg/dL Final    Valproic Acid 05/15/2025 <2.8 (L)  50.0 - 125.0 mcg/mL Final    HS Troponin T 05/15/2025 <6  <14 ng/L Final    Troponin T Numeric Delta 05/15/2025    Final    Unable to calculate.    D-Dimer, Quantitative 05/15/2025 0.63 (H)  0.00 - 0.50 MCGFEU/mL Final   Admission on 04/17/2025, Discharged on 04/17/2025   Component Date Value Ref Range Status    Color, UA 04/17/2025 Yellow  Yellow, Straw Final    Appearance, UA 04/17/2025 Clear  Clear Final    pH, UA 04/17/2025 5.5  5.0 - 8.0 Final    Specific Gravity, UA 04/17/2025 1.022  1.005 - 1.030 Final    Glucose, UA 04/17/2025 Negative  Negative Final    Ketones, UA 04/17/2025 Negative  Negative Final    Bilirubin, UA 04/17/2025 Negative  Negative Final    Blood, UA 04/17/2025 Moderate (2+) (A)  Negative Final    Protein, UA 04/17/2025 Trace (A)  Negative Final    Leuk Esterase, UA 04/17/2025 Trace (A)  Negative Final    Nitrite, UA 04/17/2025  "Negative  Negative Final    Urobilinogen, UA 04/17/2025 1.0 E.U./dL  0.2 - 1.0 E.U./dL Final    RBC, UA 04/17/2025 Too Numerous to Count (A)  None Seen, 0-2 /HPF Final    WBC, UA 04/17/2025 3-5 (A)  None Seen, 0-2 /HPF Final    Urine culture not indicated.    Bacteria, UA 04/17/2025 None Seen  None Seen /HPF Final    Squamous Epithelial Cells, UA 04/17/2025 0-2  None Seen, 0-2 /HPF Final    Hyaline Casts, UA 04/17/2025 None Seen  None Seen /LPF Final    Methodology 04/17/2025 Automated Microscopy   Final        Procedure:  Urethral dilation    Date/Time: 7/8/2025 12:11 PM    Performed by: Gokul Howell PA-C  Authorized by: Gokul Howell PA-C  Consent: Verbal consent obtained  Risks and benefits: risks, benefits and alternatives were discussed  Consent given by: patient  Patient understanding: patient states understanding of the procedure being performed  Patient consent: the patient's understanding of the procedure matches consent given  Procedure consent: procedure consent matches procedure scheduled  Relevant documents: relevant documents present and verified  Test results: test results available and properly labeled  Site marked: the operative site was marked  Required items: required blood products, implants, devices, and special equipment available  Patient identity confirmed: verbally with patient  Time out: Immediately prior to procedure a \"time out\" was called to verify the correct patient, procedure, equipment, support staff and site/side marked as required.  Preparation: Patient was prepped and draped in the usual sterile fashion.  Local anesthesia used: yes    Anesthesia:  Local anesthesia used: yes  Local Anesthetic: topical anesthetic    Sedation:  Patient sedated: no    Patient tolerance: patient tolerated the procedure well with no immediate complications           Assessment/Plan:   Problem List Items Addressed This Visit    None  Visit Diagnoses         Urethral stricture due to childbirth   "  -  Primary    Relevant Medications    gentamicin (GARAMYCIN) injection 80 mg (Completed)            Health Maintenance:   Health Maintenance Due   Topic Date Due    Pneumococcal Vaccine 50+ (1 of 2 - PCV) Never done    MAMMOGRAM  Never done    ANNUAL PHYSICAL  Never done    COVID-19 Vaccine (1 - 2024-25 season) Never done    TDAP/TD VACCINES (3 - Td or Tdap) 09/25/2024    ZOSTER VACCINE (1 of 2) Never done        Smoking Counseling: Former smoker.  Never used smokeless tobacco.  Counseling given.    Urine Incontinence: Patient reports that she is not currently experiencing any symptoms of urinary incontinence.    Patient was given instructions and counseling regarding her condition or for health maintenance advice. Please see specific information pulled into the AVS if appropriate.    Patient Education:   Urethral stricture -I gently dilated the patient to a 20 English urethral sound.  She tolerated this well with no acute complications.  We gave her an injection gentamicin 80 mg for prophylaxis.  Will have her follow-up in 1 month for repeat dilation.  I will send in a refill of her Phenergan per her request.    Visit Diagnoses:    ICD-10-CM ICD-9-CM   1. Urethral stricture due to childbirth  N35.021 598.1       Meds Ordered During Visit:  New Medications Ordered This Visit   Medications    gentamicin (GARAMYCIN) injection 80 mg       Follow Up Appointment:   No follow-ups on file.      This document has been electronically signed by Gokul Howell PA-C   July 8, 2025 12:12 EDT    Part of this note may be an electronic transcription/translation of spoken language to printed text using the Dragon Dictation System.

## 2025-07-09 DIAGNOSIS — R35.0 FREQUENCY OF MICTURITION: ICD-10-CM

## 2025-07-09 DIAGNOSIS — N23 RENAL COLIC: ICD-10-CM

## 2025-07-09 RX ORDER — PROMETHAZINE HYDROCHLORIDE 25 MG/1
TABLET ORAL
Qty: 21 TABLET | Refills: 0 | OUTPATIENT
Start: 2025-07-09

## 2025-07-10 ENCOUNTER — TELEPHONE (OUTPATIENT)
Dept: UROLOGY | Facility: CLINIC | Age: 50
End: 2025-07-10
Payer: COMMERCIAL

## 2025-07-10 DIAGNOSIS — N23 RENAL COLIC: ICD-10-CM

## 2025-07-10 DIAGNOSIS — R35.0 FREQUENCY OF MICTURITION: ICD-10-CM

## 2025-07-10 RX ORDER — PROMETHAZINE HYDROCHLORIDE 25 MG/1
25 TABLET ORAL EVERY 6 HOURS PRN
Qty: 21 TABLET | Refills: 5 | Status: SHIPPED | OUTPATIENT
Start: 2025-07-10

## 2025-07-22 ENCOUNTER — HOSPITAL ENCOUNTER (EMERGENCY)
Facility: HOSPITAL | Age: 50
Discharge: HOME | End: 2025-07-22
Attending: FAMILY MEDICINE
Payer: COMMERCIAL

## 2025-07-22 VITALS
SYSTOLIC BLOOD PRESSURE: 135 MMHG | TEMPERATURE: 97.8 F | OXYGEN SATURATION: 94 % | HEART RATE: 60 BPM | WEIGHT: 119 LBS | BODY MASS INDEX: 18.04 KG/M2 | HEIGHT: 68 IN | RESPIRATION RATE: 16 BRPM | DIASTOLIC BLOOD PRESSURE: 90 MMHG

## 2025-07-22 DIAGNOSIS — G43.809 OTHER MIGRAINE WITHOUT STATUS MIGRAINOSUS, NOT INTRACTABLE: Primary | ICD-10-CM

## 2025-07-22 PROCEDURE — 25010000002 HEPARIN LOCK FLUSH PER 10 UNITS: Performed by: FAMILY MEDICINE

## 2025-07-22 PROCEDURE — 96376 TX/PRO/DX INJ SAME DRUG ADON: CPT

## 2025-07-22 PROCEDURE — 25010000002 DEXAMETHASONE PER 1 MG

## 2025-07-22 PROCEDURE — 25810000003 SODIUM CHLORIDE 0.9 % SOLUTION

## 2025-07-22 PROCEDURE — 25010000002 HYDROMORPHONE 1 MG/ML SOLUTION: Performed by: FAMILY MEDICINE

## 2025-07-22 PROCEDURE — 96375 TX/PRO/DX INJ NEW DRUG ADDON: CPT

## 2025-07-22 PROCEDURE — 25010000002 DIPHENHYDRAMINE PER 50 MG

## 2025-07-22 PROCEDURE — 25010000002 ONDANSETRON PER 1 MG

## 2025-07-22 PROCEDURE — 99282 EMERGENCY DEPT VISIT SF MDM: CPT

## 2025-07-22 PROCEDURE — 96374 THER/PROPH/DIAG INJ IV PUSH: CPT

## 2025-07-22 RX ORDER — DEXAMETHASONE SODIUM PHOSPHATE 10 MG/ML
10 INJECTION, SOLUTION INTRA-ARTICULAR; INTRALESIONAL; INTRAMUSCULAR; INTRAVENOUS; SOFT TISSUE ONCE
Status: COMPLETED | OUTPATIENT
Start: 2025-07-22 | End: 2025-07-22

## 2025-07-22 RX ORDER — HEPARIN SODIUM (PORCINE) LOCK FLUSH IV SOLN 100 UNIT/ML 100 UNIT/ML
300 SOLUTION INTRAVENOUS ONCE
Status: COMPLETED | OUTPATIENT
Start: 2025-07-22 | End: 2025-07-22

## 2025-07-22 RX ORDER — ONDANSETRON 2 MG/ML
4 INJECTION INTRAMUSCULAR; INTRAVENOUS ONCE
Status: COMPLETED | OUTPATIENT
Start: 2025-07-22 | End: 2025-07-22

## 2025-07-22 RX ORDER — DIPHENHYDRAMINE HYDROCHLORIDE 50 MG/ML
25 INJECTION, SOLUTION INTRAMUSCULAR; INTRAVENOUS ONCE
Status: COMPLETED | OUTPATIENT
Start: 2025-07-22 | End: 2025-07-22

## 2025-07-22 RX ADMIN — Medication 300 UNITS: at 13:26

## 2025-07-22 RX ADMIN — DIPHENHYDRAMINE HYDROCHLORIDE 25 MG: 50 INJECTION INTRAMUSCULAR; INTRAVENOUS at 12:25

## 2025-07-22 RX ADMIN — ONDANSETRON 4 MG: 2 INJECTION, SOLUTION INTRAMUSCULAR; INTRAVENOUS at 11:22

## 2025-07-22 RX ADMIN — SODIUM CHLORIDE 1000 ML: 9 INJECTION, SOLUTION INTRAVENOUS at 11:22

## 2025-07-22 RX ADMIN — HYDROMORPHONE HYDROCHLORIDE 1 MG: 1 INJECTION, SOLUTION INTRAMUSCULAR; INTRAVENOUS; SUBCUTANEOUS at 11:21

## 2025-07-22 RX ADMIN — DEXAMETHASONE SODIUM PHOSPHATE 10 MG: 10 INJECTION INTRAMUSCULAR; INTRAVENOUS at 11:22

## 2025-07-22 RX ADMIN — HYDROMORPHONE HYDROCHLORIDE 1 MG: 1 INJECTION, SOLUTION INTRAMUSCULAR; INTRAVENOUS; SUBCUTANEOUS at 12:25

## 2025-07-26 NOTE — ED PROVIDER NOTES
Subjective   History of Present Illness  Patient is a 50-year-old female who presents today with complaints of a migraine headache.  She reports she has a history of migraines and takes medication for them at home however today the medication is not working.  Denies dizziness or numbness/tingling.  She does report she has some associated photophobia.  Reports that this does feel like her normal migraine that she gets.  Reports nausea and denies vomiting.  She denies other complaints.        Review of Systems   Eyes:  Positive for photophobia.   Respiratory: Negative.     Cardiovascular: Negative.    Gastrointestinal: Negative.    Genitourinary: Negative.    Neurological:  Positive for headaches.       Past Medical History:   Diagnosis Date    Hoyos's disease     Bipolar 1 disorder     Constipation     DDD (degenerative disc disease), cervical 05/29/2017    Fibromyalgia     IBS (irritable bowel syndrome)     Meningioma     Migraine     PONV (postoperative nausea and vomiting)     PTSD (post-traumatic stress disorder)     Rectal bleeding        Allergies   Allergen Reactions    Ativan [Lorazepam] Hallucinations     confusion    Sulfa Antibiotics Shortness Of Breath and Swelling    Sulfa Antibiotics Anaphylaxis    Reglan [Metoclopramide] Angioedema    Compazine [Prochlorperazine Edisylate] Hives    Demerol [Meperidine] Hives    Droperidol Itching    Metoclopramide Swelling    Toradol [Ketorolac Tromethamine] Hives and Itching    Toradol [Ketorolac Tromethamine] Hives    Zosyn [Piperacillin Sod-Tazobactam So] Hives       Past Surgical History:   Procedure Laterality Date    ANAL SCOPE N/A 7/28/2016    Procedure: ANAL SCOPE;  Surgeon: Kael Lopez MD;  Location: [facility] OR;  Service:     APPENDECTOMY      COLONOSCOPY N/A 6/30/2016    Procedure: COLONOSCOPY  CPTCODE:87121;  Surgeon: Jose Antonio Belle III, MD;  Location: [facility] OR;  Service:     COLONOSCOPY N/A 7/7/2016    Procedure: COLONOSCOPY (26270) CPT;  Surgeon:  Jose Antonio Belle III, MD;  Location: [facility] OR;  Service:     CYSTOSCOPY RETROGRADE PYELOGRAM Bilateral 2017    Procedure: CYSTOSCOPY RETROGRADE PYELOGRAM;  Surgeon: Mu Blunt MD;  Location: [facility] OR;  Service:     ENDOSCOPY N/A 2016    Procedure: ESOPHAGOGASTRODUODENOSCOPY WITH BIOPSY  CPTCODE:08318;  Surgeon: Jose Antonio Blele III, MD;  Location: [facility] OR;  Service:     HEMORRHOIDECTOMY N/A 2016    Procedure: HEMORRHOID STAPLING;  Surgeon: Kael Lopez MD;  Location: [facility] OR;  Service:     HYSTERECTOMY      KNEE SURGERY      LAPAROSCOPIC SALPINGOOPHERECTOMY      PORTACATH PLACEMENT N/A 2017    Procedure: INSERTION OF PORTACATH;  Surgeon: Celso Arredondo MD;  Location: [facility];  Service:     SHOULDER SURGERY      3 times       Family History   Problem Relation Age of Onset    Crohn's disease Other     Hypertension Other     Diabetes Other     Irritable bowel syndrome Other     No Known Problems Father     No Known Problems Mother        Social History     Socioeconomic History    Marital status:    Tobacco Use    Smoking status: Former     Current packs/day: 0.00     Average packs/day: 1 pack/day for 20.0 years (20.0 ttl pk-yrs)     Types: Cigarettes     Start date: 1995     Quit date: 2015     Years since quittin.7     Passive exposure: Past    Smokeless tobacco: Never   Vaping Use    Vaping status: Never Used   Substance and Sexual Activity    Alcohol use: No    Drug use: Yes     Types: Marijuana     Comment: for pain    Sexual activity: Defer     Birth control/protection: Surgical           Objective   Physical Exam  Vitals and nursing note reviewed.   Constitutional:       Appearance: Normal appearance.   Eyes:      Extraocular Movements: Extraocular movements intact.      Pupils: Pupils are equal, round, and reactive to light.   Cardiovascular:      Rate and Rhythm: Normal rate and regular rhythm.      Pulses: Normal pulses.   Pulmonary:       Effort: Pulmonary effort is normal.   Musculoskeletal:      Cervical back: Normal range of motion.   Neurological:      Mental Status: She is alert and oriented to person, place, and time.         Procedures           ED Course                                                       Medical Decision Making  Patient is a 50-year-old female who presents today with complaints of a migraine headache.  She reports she has a history of migraines and takes medication for them at home however today the medication is not working.  Denies dizziness or numbness/tingling.  She does report she has some associated photophobia.  Reports that this does feel like her normal migraine that she gets.  Reports nausea and denies vomiting.  She denies other complaints.    Problems Addressed:  Other migraine without status migrainosus, not intractable: complicated acute illness or injury    Risk  Prescription drug management.        Final diagnoses:   Other migraine without status migrainosus, not intractable       ED Disposition  ED Disposition       ED Disposition   Discharge    Condition   Stable    Comment   --               Mabel Patterson, APRN  [address] [address]  [phone]    Schedule an appointment as soon as possible for a visit   As needed    [facility] EMERGENCY DEPARTMENT  [address] [place] [id number] [phone]  Go to   If symptoms worsen         Medication List      No changes were made to your prescriptions during this visit.            Olga Oscar, APRN  07/26/25 0424

## 2025-08-07 ENCOUNTER — OFFICE VISIT (OUTPATIENT)
Dept: UROLOGY | Facility: CLINIC | Age: 50
End: 2025-08-07
Payer: COMMERCIAL

## 2025-08-07 DIAGNOSIS — Z48.816 AFTERCARE FOLLOWING SURGERY OF THE GENITOURINARY SYSTEM: ICD-10-CM

## 2025-08-07 DIAGNOSIS — N35.021 URETHRAL STRICTURE DUE TO CHILDBIRTH: ICD-10-CM

## 2025-08-07 DIAGNOSIS — N35.028 OTHER POST-TRAUMATIC URETHRAL STRICTURE, FEMALE: Primary | ICD-10-CM

## 2025-08-07 RX ORDER — GENTAMICIN 40 MG/ML
80 INJECTION, SOLUTION INTRAMUSCULAR; INTRAVENOUS ONCE
Status: COMPLETED | OUTPATIENT
Start: 2025-08-07 | End: 2025-08-07

## 2025-08-07 RX ADMIN — GENTAMICIN 80 MG: 40 INJECTION, SOLUTION INTRAMUSCULAR; INTRAVENOUS at 13:14

## 2025-08-11 ENCOUNTER — APPOINTMENT (OUTPATIENT)
Dept: CT IMAGING | Facility: HOSPITAL | Age: 50
End: 2025-08-11
Payer: COMMERCIAL

## 2025-08-11 ENCOUNTER — HOSPITAL ENCOUNTER (EMERGENCY)
Facility: HOSPITAL | Age: 50
Discharge: HOME | End: 2025-08-11
Attending: EMERGENCY MEDICINE | Admitting: EMERGENCY MEDICINE
Payer: COMMERCIAL

## 2025-08-11 VITALS
RESPIRATION RATE: 14 BRPM | BODY MASS INDEX: 18.04 KG/M2 | SYSTOLIC BLOOD PRESSURE: 132 MMHG | HEIGHT: 68 IN | HEART RATE: 116 BPM | DIASTOLIC BLOOD PRESSURE: 77 MMHG | TEMPERATURE: 98.3 F | WEIGHT: 119 LBS | OXYGEN SATURATION: 96 %

## 2025-08-11 DIAGNOSIS — R31.0 GROSS HEMATURIA: Primary | ICD-10-CM

## 2025-08-11 DIAGNOSIS — R10.9 ABDOMINAL PAIN, UNSPECIFIED ABDOMINAL LOCATION: ICD-10-CM

## 2025-08-11 DIAGNOSIS — R10.9 FLANK PAIN: ICD-10-CM

## 2025-08-11 LAB
ALBUMIN SERPL-MCNC: 4.1 G/DL (ref 3.5–5.2)
ALBUMIN/GLOB SERPL: 1.5 G/DL
ALP SERPL-CCNC: 81 U/L (ref 39–117)
ALT SERPL W P-5'-P-CCNC: 28 U/L (ref 1–33)
ANION GAP SERPL CALCULATED.3IONS-SCNC: 10.2 MMOL/L (ref 5–15)
AST SERPL-CCNC: 36 U/L (ref 1–32)
BACTERIA UR QL AUTO: ABNORMAL /HPF
BASOPHILS # BLD AUTO: 0.01 10*3/MM3 (ref 0–0.2)
BASOPHILS NFR BLD AUTO: 0.2 % (ref 0–1.5)
BILIRUB SERPL-MCNC: 0.3 MG/DL (ref 0–1.2)
BILIRUB UR QL STRIP: NEGATIVE
BUN SERPL-MCNC: 9.1 MG/DL (ref 6–20)
BUN/CREAT SERPL: 19 (ref 7–25)
CALCIUM SPEC-SCNC: 8.8 MG/DL (ref 8.6–10.5)
CHLORIDE SERPL-SCNC: 103 MMOL/L (ref 98–107)
CLARITY UR: CLEAR
CO2 SERPL-SCNC: 25.8 MMOL/L (ref 22–29)
COLOR UR: ABNORMAL
CREAT SERPL-MCNC: 0.48 MG/DL (ref 0.57–1)
CRP SERPL-MCNC: <0.3 MG/DL (ref 0–0.5)
DEPRECATED RDW RBC AUTO: 44.9 FL (ref 37–54)
EGFRCR SERPLBLD CKD-EPI 2021: 115.6 ML/MIN/1.73
EOSINOPHIL # BLD AUTO: 0.05 10*3/MM3 (ref 0–0.4)
EOSINOPHIL NFR BLD AUTO: 1 % (ref 0.3–6.2)
ERYTHROCYTE [DISTWIDTH] IN BLOOD BY AUTOMATED COUNT: 13.2 % (ref 12.3–15.4)
GLOBULIN UR ELPH-MCNC: 2.7 GM/DL
GLUCOSE SERPL-MCNC: 92 MG/DL (ref 65–99)
GLUCOSE UR STRIP-MCNC: NEGATIVE MG/DL
HCT VFR BLD AUTO: 47 % (ref 34–46.6)
HGB BLD-MCNC: 15.6 G/DL (ref 12–15.9)
HGB UR QL STRIP.AUTO: ABNORMAL
HYALINE CASTS UR QL AUTO: ABNORMAL /LPF
IMM GRANULOCYTES # BLD AUTO: 0.01 10*3/MM3 (ref 0–0.05)
IMM GRANULOCYTES NFR BLD AUTO: 0.2 % (ref 0–0.5)
INR PPP: 0.99 (ref 0.9–1.1)
KETONES UR QL STRIP: NEGATIVE
LEUKOCYTE ESTERASE UR QL STRIP.AUTO: ABNORMAL
LYMPHOCYTES # BLD AUTO: 1.74 10*3/MM3 (ref 0.7–3.1)
LYMPHOCYTES NFR BLD AUTO: 35.8 % (ref 19.6–45.3)
MCH RBC QN AUTO: 30.5 PG (ref 26.6–33)
MCHC RBC AUTO-ENTMCNC: 33.2 G/DL (ref 31.5–35.7)
MCV RBC AUTO: 92 FL (ref 79–97)
MONOCYTES # BLD AUTO: 0.36 10*3/MM3 (ref 0.1–0.9)
MONOCYTES NFR BLD AUTO: 7.4 % (ref 5–12)
NEUTROPHILS NFR BLD AUTO: 2.69 10*3/MM3 (ref 1.7–7)
NEUTROPHILS NFR BLD AUTO: 55.4 % (ref 42.7–76)
NITRITE UR QL STRIP: NEGATIVE
NRBC BLD AUTO-RTO: 0 /100 WBC (ref 0–0.2)
PH UR STRIP.AUTO: 6.5 [PH] (ref 5–8)
PLATELET # BLD AUTO: 173 10*3/MM3 (ref 140–450)
PMV BLD AUTO: 10.2 FL (ref 6–12)
POTASSIUM SERPL-SCNC: 4.2 MMOL/L (ref 3.5–5.2)
PROT SERPL-MCNC: 6.8 G/DL (ref 6–8.5)
PROT UR QL STRIP: NEGATIVE
PROTHROMBIN TIME: 13.7 SECONDS (ref 12.5–15.2)
RBC # BLD AUTO: 5.11 10*6/MM3 (ref 3.77–5.28)
RBC # UR STRIP: ABNORMAL /HPF
REF LAB TEST METHOD: ABNORMAL
SODIUM SERPL-SCNC: 139 MMOL/L (ref 136–145)
SP GR UR STRIP: 1.01 (ref 1–1.03)
SQUAMOUS #/AREA URNS HPF: ABNORMAL /HPF
UROBILINOGEN UR QL STRIP: ABNORMAL
WBC # UR STRIP: ABNORMAL /HPF
WBC NRBC COR # BLD AUTO: 4.86 10*3/MM3 (ref 3.4–10.8)

## 2025-08-11 PROCEDURE — 74176 CT ABD & PELVIS W/O CONTRAST: CPT

## 2025-08-11 PROCEDURE — 85610 PROTHROMBIN TIME: CPT | Performed by: EMERGENCY MEDICINE

## 2025-08-11 PROCEDURE — 96375 TX/PRO/DX INJ NEW DRUG ADDON: CPT

## 2025-08-11 PROCEDURE — 96374 THER/PROPH/DIAG INJ IV PUSH: CPT

## 2025-08-11 PROCEDURE — 80053 COMPREHEN METABOLIC PANEL: CPT | Performed by: EMERGENCY MEDICINE

## 2025-08-11 PROCEDURE — 36415 COLL VENOUS BLD VENIPUNCTURE: CPT

## 2025-08-11 PROCEDURE — 85025 COMPLETE CBC W/AUTO DIFF WBC: CPT | Performed by: EMERGENCY MEDICINE

## 2025-08-11 PROCEDURE — 99284 EMERGENCY DEPT VISIT MOD MDM: CPT

## 2025-08-11 PROCEDURE — 25010000002 HEPARIN LOCK FLUSH PER 10 UNITS: Performed by: EMERGENCY MEDICINE

## 2025-08-11 PROCEDURE — 25010000002 ONDANSETRON PER 1 MG: Performed by: EMERGENCY MEDICINE

## 2025-08-11 PROCEDURE — 25010000002 HYDROMORPHONE 1 MG/ML SOLUTION: Performed by: EMERGENCY MEDICINE

## 2025-08-11 PROCEDURE — 81001 URINALYSIS AUTO W/SCOPE: CPT | Performed by: EMERGENCY MEDICINE

## 2025-08-11 PROCEDURE — 86140 C-REACTIVE PROTEIN: CPT | Performed by: EMERGENCY MEDICINE

## 2025-08-11 PROCEDURE — 96376 TX/PRO/DX INJ SAME DRUG ADON: CPT

## 2025-08-11 RX ORDER — SODIUM CHLORIDE 0.9 % (FLUSH) 0.9 %
10 SYRINGE (ML) INJECTION AS NEEDED
Status: DISCONTINUED | OUTPATIENT
Start: 2025-08-11 | End: 2025-08-11 | Stop reason: HOSPADM

## 2025-08-11 RX ORDER — OXYCODONE AND ACETAMINOPHEN 10; 325 MG/1; MG/1
1 TABLET ORAL EVERY 8 HOURS PRN
Qty: 10 TABLET | Refills: 0 | Status: SHIPPED | OUTPATIENT
Start: 2025-08-11

## 2025-08-11 RX ORDER — ONDANSETRON 2 MG/ML
4 INJECTION INTRAMUSCULAR; INTRAVENOUS ONCE
Status: COMPLETED | OUTPATIENT
Start: 2025-08-11 | End: 2025-08-11

## 2025-08-11 RX ORDER — HEPARIN SODIUM (PORCINE) LOCK FLUSH IV SOLN 100 UNIT/ML 100 UNIT/ML
300 SOLUTION INTRAVENOUS ONCE
Status: COMPLETED | OUTPATIENT
Start: 2025-08-11 | End: 2025-08-11

## 2025-08-11 RX ADMIN — ONDANSETRON 4 MG: 2 INJECTION INTRAMUSCULAR; INTRAVENOUS at 11:17

## 2025-08-11 RX ADMIN — HYDROMORPHONE HYDROCHLORIDE 1 MG: 1 INJECTION, SOLUTION INTRAMUSCULAR; INTRAVENOUS; SUBCUTANEOUS at 11:17

## 2025-08-11 RX ADMIN — HYDROMORPHONE HYDROCHLORIDE 1 MG: 1 INJECTION, SOLUTION INTRAMUSCULAR; INTRAVENOUS; SUBCUTANEOUS at 12:17

## 2025-08-11 RX ADMIN — HEPARIN 300 UNITS: 100 SYRINGE at 12:18

## 2025-08-24 ENCOUNTER — HOSPITAL ENCOUNTER (EMERGENCY)
Facility: HOSPITAL | Age: 50
Discharge: HOME | End: 2025-08-24
Payer: COMMERCIAL

## 2025-08-24 VITALS
SYSTOLIC BLOOD PRESSURE: 127 MMHG | DIASTOLIC BLOOD PRESSURE: 74 MMHG | HEIGHT: 68 IN | RESPIRATION RATE: 14 BRPM | WEIGHT: 119 LBS | HEART RATE: 68 BPM | BODY MASS INDEX: 18.04 KG/M2 | TEMPERATURE: 98.9 F | OXYGEN SATURATION: 97 %

## 2025-08-24 DIAGNOSIS — R31.9 HEMATURIA, UNSPECIFIED TYPE: ICD-10-CM

## 2025-08-24 DIAGNOSIS — R10.2 SUPRAPUBIC PAIN: Primary | ICD-10-CM

## 2025-08-24 LAB
ALBUMIN SERPL-MCNC: 4.3 G/DL (ref 3.5–5.2)
ALBUMIN/GLOB SERPL: 1.7 G/DL
ALP SERPL-CCNC: 78 U/L (ref 39–117)
ALT SERPL W P-5'-P-CCNC: 29 U/L (ref 1–33)
ANION GAP SERPL CALCULATED.3IONS-SCNC: 11 MMOL/L (ref 5–15)
AST SERPL-CCNC: 32 U/L (ref 1–32)
BACTERIA UR QL AUTO: ABNORMAL /HPF
BASOPHILS # BLD AUTO: 0.01 10*3/MM3 (ref 0–0.2)
BASOPHILS NFR BLD AUTO: 0.2 % (ref 0–1.5)
BILIRUB SERPL-MCNC: 0.4 MG/DL (ref 0–1.2)
BILIRUB UR QL STRIP: NEGATIVE
BUN SERPL-MCNC: 8.4 MG/DL (ref 6–20)
BUN/CREAT SERPL: 15.8 (ref 7–25)
CALCIUM SPEC-SCNC: 9.2 MG/DL (ref 8.6–10.5)
CHLORIDE SERPL-SCNC: 104 MMOL/L (ref 98–107)
CLARITY UR: CLEAR
CO2 SERPL-SCNC: 25 MMOL/L (ref 22–29)
COLOR UR: ABNORMAL
CREAT SERPL-MCNC: 0.53 MG/DL (ref 0.57–1)
DEPRECATED RDW RBC AUTO: 45.9 FL (ref 37–54)
EGFRCR SERPLBLD CKD-EPI 2021: 112.8 ML/MIN/1.73
EOSINOPHIL # BLD AUTO: 0.08 10*3/MM3 (ref 0–0.4)
EOSINOPHIL NFR BLD AUTO: 1.7 % (ref 0.3–6.2)
ERYTHROCYTE [DISTWIDTH] IN BLOOD BY AUTOMATED COUNT: 13.3 % (ref 12.3–15.4)
GLOBULIN UR ELPH-MCNC: 2.6 GM/DL
GLUCOSE SERPL-MCNC: 115 MG/DL (ref 65–99)
GLUCOSE UR STRIP-MCNC: NEGATIVE MG/DL
HCT VFR BLD AUTO: 46.7 % (ref 34–46.6)
HGB BLD-MCNC: 15.3 G/DL (ref 12–15.9)
HGB UR QL STRIP.AUTO: ABNORMAL
HYALINE CASTS UR QL AUTO: ABNORMAL /LPF
IMM GRANULOCYTES # BLD AUTO: 0.01 10*3/MM3 (ref 0–0.05)
IMM GRANULOCYTES NFR BLD AUTO: 0.2 % (ref 0–0.5)
KETONES UR QL STRIP: NEGATIVE
LEUKOCYTE ESTERASE UR QL STRIP.AUTO: ABNORMAL
LYMPHOCYTES # BLD AUTO: 1.41 10*3/MM3 (ref 0.7–3.1)
LYMPHOCYTES NFR BLD AUTO: 30.3 % (ref 19.6–45.3)
MCH RBC QN AUTO: 30.6 PG (ref 26.6–33)
MCHC RBC AUTO-ENTMCNC: 32.8 G/DL (ref 31.5–35.7)
MCV RBC AUTO: 93.4 FL (ref 79–97)
MONOCYTES # BLD AUTO: 0.3 10*3/MM3 (ref 0.1–0.9)
MONOCYTES NFR BLD AUTO: 6.4 % (ref 5–12)
NEUTROPHILS NFR BLD AUTO: 2.85 10*3/MM3 (ref 1.7–7)
NEUTROPHILS NFR BLD AUTO: 61.2 % (ref 42.7–76)
NITRITE UR QL STRIP: NEGATIVE
NRBC BLD AUTO-RTO: 0 /100 WBC (ref 0–0.2)
PH UR STRIP.AUTO: 7 [PH] (ref 5–8)
PLATELET # BLD AUTO: 150 10*3/MM3 (ref 140–450)
PMV BLD AUTO: 10.6 FL (ref 6–12)
POTASSIUM SERPL-SCNC: 3.9 MMOL/L (ref 3.5–5.2)
PROT SERPL-MCNC: 6.9 G/DL (ref 6–8.5)
PROT UR QL STRIP: ABNORMAL
RBC # BLD AUTO: 5 10*6/MM3 (ref 3.77–5.28)
RBC # UR STRIP: ABNORMAL /HPF
REF LAB TEST METHOD: ABNORMAL
SODIUM SERPL-SCNC: 140 MMOL/L (ref 136–145)
SP GR UR STRIP: 1.01 (ref 1–1.03)
SQUAMOUS #/AREA URNS HPF: ABNORMAL /HPF
UROBILINOGEN UR QL STRIP: ABNORMAL
WBC # UR STRIP: ABNORMAL /HPF
WBC NRBC COR # BLD AUTO: 4.66 10*3/MM3 (ref 3.4–10.8)

## 2025-08-24 PROCEDURE — 25010000002 BUTORPHANOL PER 1 MG: Performed by: NURSE PRACTITIONER

## 2025-08-24 PROCEDURE — 99283 EMERGENCY DEPT VISIT LOW MDM: CPT

## 2025-08-24 PROCEDURE — 96374 THER/PROPH/DIAG INJ IV PUSH: CPT

## 2025-08-24 PROCEDURE — 36415 COLL VENOUS BLD VENIPUNCTURE: CPT

## 2025-08-24 PROCEDURE — 81001 URINALYSIS AUTO W/SCOPE: CPT | Performed by: NURSE PRACTITIONER

## 2025-08-24 PROCEDURE — 96375 TX/PRO/DX INJ NEW DRUG ADDON: CPT

## 2025-08-24 PROCEDURE — 80053 COMPREHEN METABOLIC PANEL: CPT | Performed by: NURSE PRACTITIONER

## 2025-08-24 PROCEDURE — 96361 HYDRATE IV INFUSION ADD-ON: CPT

## 2025-08-24 PROCEDURE — 85025 COMPLETE CBC W/AUTO DIFF WBC: CPT | Performed by: NURSE PRACTITIONER

## 2025-08-24 PROCEDURE — 25010000002 ONDANSETRON PER 1 MG: Performed by: NURSE PRACTITIONER

## 2025-08-24 PROCEDURE — 25810000003 SODIUM CHLORIDE 0.9 % SOLUTION: Performed by: NURSE PRACTITIONER

## 2025-08-24 RX ORDER — HYDROCODONE BITARTRATE AND ACETAMINOPHEN 7.5; 325 MG/1; MG/1
1 TABLET ORAL EVERY 6 HOURS PRN
Qty: 10 TABLET | Refills: 0 | Status: SHIPPED | OUTPATIENT
Start: 2025-08-24

## 2025-08-24 RX ORDER — BUTORPHANOL TARTRATE 1 MG/ML
1 INJECTION, SOLUTION INTRAMUSCULAR; INTRAVENOUS ONCE
Status: COMPLETED | OUTPATIENT
Start: 2025-08-24 | End: 2025-08-24

## 2025-08-24 RX ORDER — ONDANSETRON 2 MG/ML
4 INJECTION INTRAMUSCULAR; INTRAVENOUS ONCE
Status: COMPLETED | OUTPATIENT
Start: 2025-08-24 | End: 2025-08-24

## 2025-08-24 RX ORDER — SODIUM CHLORIDE 0.9 % (FLUSH) 0.9 %
10 SYRINGE (ML) INJECTION AS NEEDED
Status: DISCONTINUED | OUTPATIENT
Start: 2025-08-24 | End: 2025-08-24 | Stop reason: HOSPADM

## 2025-08-24 RX ADMIN — ONDANSETRON 4 MG: 2 INJECTION, SOLUTION INTRAMUSCULAR; INTRAVENOUS at 12:18

## 2025-08-24 RX ADMIN — BUTORPHANOL TARTRATE 1 MG: 1 INJECTION, SOLUTION INTRAMUSCULAR; INTRAVENOUS at 12:23

## 2025-08-24 RX ADMIN — SODIUM CHLORIDE 1000 ML: 9 INJECTION, SOLUTION INTRAVENOUS at 11:46

## (undated) DEVICE — SUT VIC 3/0 SH 27IN J416H

## (undated) DEVICE — DRAPE,T,LAPARO,TRANS,STERILE: Brand: MEDLINE

## (undated) DEVICE — HOLDER: Brand: DEROYAL

## (undated) DEVICE — SYR LUERLOK 30CC

## (undated) DEVICE — NDL HYPO ECLPS SFTY 18G 1 1/2IN

## (undated) DEVICE — DRP C/ARM W/BAND W/CLIPS 41X74IN

## (undated) DEVICE — KT CVR ULTRASND PROB PULL UP LXF 5X8

## (undated) DEVICE — CATH URETRL FLXITP POLLACK STD 5F 70CM

## (undated) DEVICE — SYS IRR PUMP SGL ACTN VAC SYR 10CC

## (undated) DEVICE — GLW STD STR 3CM .035X150CM

## (undated) DEVICE — SKIN AFFIX SURG ADHESIVE 72/CS 0.55ML: Brand: MEDLINE

## (undated) DEVICE — NDL HYPO ECLPS SFTY 22G 1 1/2IN

## (undated) DEVICE — ENCORE® LATEX MICRO SIZE 8, STERILE LATEX POWDER-FREE SURGICAL GLOVE: Brand: ENCORE

## (undated) DEVICE — ENCORE® LATEX MICRO SIZE 7.5, STERILE LATEX POWDER-FREE SURGICAL GLOVE: Brand: ENCORE

## (undated) DEVICE — PK BASIC 70

## (undated) DEVICE — SUT MNCRYL PLS ANTIB UD 4/0 PS2 18IN

## (undated) DEVICE — INTENDED FOR TISSUE SEPARATION, AND OTHER PROCEDURES THAT REQUIRE A SHARP SURGICAL BLADE TO PUNCTURE OR CUT.: Brand: BARD-PARKER ® CARBON RIB-BACK BLADES

## (undated) DEVICE — SUT PROLN 3/0 8832H

## (undated) DEVICE — COR CYSTO: Brand: MEDLINE INDUSTRIES, INC.

## (undated) DEVICE — GW SUP AMPLATZ ULTR/STFF/STR PTFE SS 0.35IN 145CM

## (undated) DEVICE — DRSNG SURESITE WNDW 4X4.5